# Patient Record
Sex: FEMALE | Race: WHITE | NOT HISPANIC OR LATINO | Employment: FULL TIME | URBAN - METROPOLITAN AREA
[De-identification: names, ages, dates, MRNs, and addresses within clinical notes are randomized per-mention and may not be internally consistent; named-entity substitution may affect disease eponyms.]

---

## 2017-01-11 ENCOUNTER — PATIENT OUTREACH (OUTPATIENT)
Dept: ADMINISTRATIVE | Facility: HOSPITAL | Age: 47
End: 2017-01-11

## 2017-01-26 ENCOUNTER — LAB VISIT (OUTPATIENT)
Dept: LAB | Facility: HOSPITAL | Age: 47
End: 2017-01-26
Attending: INTERNAL MEDICINE
Payer: COMMERCIAL

## 2017-01-26 DIAGNOSIS — E03.9 ACQUIRED HYPOTHYROIDISM: ICD-10-CM

## 2017-01-26 LAB — TSH SERPL DL<=0.005 MIU/L-ACNC: 2.99 UIU/ML

## 2017-01-26 PROCEDURE — 84443 ASSAY THYROID STIM HORMONE: CPT

## 2017-01-26 PROCEDURE — 36415 COLL VENOUS BLD VENIPUNCTURE: CPT | Mod: PO

## 2017-01-27 ENCOUNTER — OFFICE VISIT (OUTPATIENT)
Dept: ENDOCRINOLOGY | Facility: CLINIC | Age: 47
End: 2017-01-27
Payer: COMMERCIAL

## 2017-01-27 VITALS
WEIGHT: 293 LBS | BODY MASS INDEX: 47.09 KG/M2 | SYSTOLIC BLOOD PRESSURE: 108 MMHG | HEIGHT: 66 IN | DIASTOLIC BLOOD PRESSURE: 72 MMHG | HEART RATE: 76 BPM

## 2017-01-27 DIAGNOSIS — E66.01 MORBID OBESITY, UNSPECIFIED OBESITY TYPE: ICD-10-CM

## 2017-01-27 DIAGNOSIS — E03.9 HYPOTHYROIDISM, UNSPECIFIED TYPE: Primary | ICD-10-CM

## 2017-01-27 DIAGNOSIS — E04.2 MULTINODULAR GOITER: ICD-10-CM

## 2017-01-27 PROCEDURE — 99999 PR PBB SHADOW E&M-EST. PATIENT-LVL II: CPT | Mod: PBBFAC,,, | Performed by: INTERNAL MEDICINE

## 2017-01-27 PROCEDURE — 99214 OFFICE O/P EST MOD 30 MIN: CPT | Mod: S$GLB,,, | Performed by: INTERNAL MEDICINE

## 2017-01-27 PROCEDURE — 3078F DIAST BP <80 MM HG: CPT | Mod: S$GLB,,, | Performed by: INTERNAL MEDICINE

## 2017-01-27 PROCEDURE — 1159F MED LIST DOCD IN RCRD: CPT | Mod: S$GLB,,, | Performed by: INTERNAL MEDICINE

## 2017-01-27 PROCEDURE — 3074F SYST BP LT 130 MM HG: CPT | Mod: S$GLB,,, | Performed by: INTERNAL MEDICINE

## 2017-01-27 RX ORDER — LEVOTHYROXINE SODIUM 200 UG/1
200 TABLET ORAL
Qty: 30 TABLET | Refills: 12 | Status: SHIPPED | OUTPATIENT
Start: 2017-01-27 | End: 2018-02-03 | Stop reason: SDUPTHER

## 2017-01-27 NOTE — PROGRESS NOTES
CHIEF COMPLAINT: Hypothyroidism  46 year old being seen as a f/u. She has a history of Graves' disease which has been treated. Subsequently developed hypothyroidism. On synthroid 200 daily. She has some fatigue. Sleeps well at night. She does get some anxiety occasionally. Started wellbutrin. No diarrhea or constipation. Has not been exercising.           PAST MEDICAL HISTORY: Hypothyroidism after treatment for Graves' disease. 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 There is heart disease in the family     MEDICATIONS/ALLERGIES: The patient's MedCard has been updated and reviewed.     ROS:   Constitutional: weight increased  Eyes:  Still occasionally having blurry vision Still seeing optometrist  ENT: No dysphagia  Cardiovascular:No CP   Respiratory: Denies current respiratory difficulty  Gastrointestinal: No nausea or vomiting.   GenitoUrinary - No dysuria  Skin: No new skin rash  Neurologic: No focal neurologic complaints  Remainder ROS negative    PE:   GENERAL: Well developed, well nourished.  PSYCH: appropriate mood and affect, .   EYES: PERRL, EOM intact.  ENT: Nares patent, oropharynx clear, mucosa pink,   NECK: Supple, trachea midline, no thyroid nodules  CHEST: Resp even and unlabored, CTA bilateral.  CARDIAC: RRR, S1, S2 heard, no murmurs, rubs, S3, or S4       Results for MARNIE SINGH (MRN [id number]) as of 2017 13:27   Ref. Range 2017 12:59   TSH Latest Ref Range: 0.400 - 4.000 uIU/mL 2.992         ASSESSMENT/PLAN:  1. Hypothyroidism- TSH WNL. Continue current Tx.     2. Multinodular Goiter- US shows that the nodules are stable. Will start doing US every 2 hours.     3. Morbid Obesity- Discussed diet as well as exercise. Has not been exercising much.     FOLLOWUP  F/U 1 year TSH, Thyroid US

## 2017-05-24 ENCOUNTER — OFFICE VISIT (OUTPATIENT)
Dept: FAMILY MEDICINE | Facility: CLINIC | Age: 47
End: 2017-05-24
Payer: COMMERCIAL

## 2017-05-24 VITALS
WEIGHT: 293 LBS | HEART RATE: 96 BPM | TEMPERATURE: 98 F | SYSTOLIC BLOOD PRESSURE: 136 MMHG | BODY MASS INDEX: 47.09 KG/M2 | RESPIRATION RATE: 16 BRPM | DIASTOLIC BLOOD PRESSURE: 74 MMHG | HEIGHT: 66 IN

## 2017-05-24 DIAGNOSIS — J20.9 BRONCHITIS WITH BRONCHOSPASM: Primary | ICD-10-CM

## 2017-05-24 DIAGNOSIS — R05.9 COUGH: ICD-10-CM

## 2017-05-24 PROCEDURE — 1160F RVW MEDS BY RX/DR IN RCRD: CPT | Mod: S$GLB,,, | Performed by: NURSE PRACTITIONER

## 2017-05-24 PROCEDURE — 99214 OFFICE O/P EST MOD 30 MIN: CPT | Mod: 25,S$GLB,, | Performed by: NURSE PRACTITIONER

## 2017-05-24 PROCEDURE — 3075F SYST BP GE 130 - 139MM HG: CPT | Mod: S$GLB,,, | Performed by: NURSE PRACTITIONER

## 2017-05-24 PROCEDURE — 96372 THER/PROPH/DIAG INJ SC/IM: CPT | Mod: S$GLB,,, | Performed by: NURSE PRACTITIONER

## 2017-05-24 PROCEDURE — 3078F DIAST BP <80 MM HG: CPT | Mod: S$GLB,,, | Performed by: NURSE PRACTITIONER

## 2017-05-24 RX ORDER — PREDNISONE 20 MG/1
TABLET ORAL
Qty: 15 TABLET | Refills: 0 | Status: SHIPPED | OUTPATIENT
Start: 2017-05-24 | End: 2017-12-07

## 2017-05-24 RX ORDER — AZITHROMYCIN 250 MG/1
250 TABLET, FILM COATED ORAL
Refills: 0 | COMMUNITY
Start: 2017-05-10 | End: 2017-05-24 | Stop reason: ALTCHOICE

## 2017-05-24 RX ORDER — DEXAMETHASONE SODIUM PHOSPHATE 4 MG/ML
8 INJECTION, SOLUTION INTRA-ARTICULAR; INTRALESIONAL; INTRAMUSCULAR; INTRAVENOUS; SOFT TISSUE ONCE
Status: COMPLETED | OUTPATIENT
Start: 2017-05-24 | End: 2017-05-24

## 2017-05-24 RX ORDER — BENZONATATE 200 MG/1
200 CAPSULE ORAL 3 TIMES DAILY PRN
Qty: 30 CAPSULE | Refills: 0 | Status: SHIPPED | OUTPATIENT
Start: 2017-05-24 | End: 2017-06-03

## 2017-05-24 RX ADMIN — DEXAMETHASONE SODIUM PHOSPHATE 8 MG: 4 INJECTION, SOLUTION INTRA-ARTICULAR; INTRALESIONAL; INTRAMUSCULAR; INTRAVENOUS; SOFT TISSUE at 04:05

## 2017-05-24 NOTE — PROGRESS NOTES
"Subjective:       Patient ID: Gail Mckinnon is a 47 y.o. female.    Chief Complaint: cough due to Bronchitis x's 2 weeks    HPI onset two weeks of coughing, SOB and wheezing. Went to doctor in [place] and was given Zithromax. Cough productive at times. No fever. Throat is sore. Coughing spasms. Worse at night. Using cough drops. Taking tylenol severe sinus. No N/V/D. See ROS..    The following portion of the patients history was reviewed and updated as appropriate: allergies, current medications, past medical and surgical history. Past social history and problem list reviewed. Family PMH and Past social history reviewed. Tobacco, Illicit drug use reviewed.     Review of Systems   Constitutional: Positive for fatigue. Negative for chills and fever.   HENT: Positive for postnasal drip and rhinorrhea. Negative for sinus pressure, sneezing and sore throat.    Eyes: Negative for visual disturbance.   Respiratory: Positive for cough, chest tightness, shortness of breath and wheezing.    Cardiovascular: Negative for chest pain and palpitations.   Gastrointestinal: Negative for abdominal pain, constipation, diarrhea, nausea and vomiting.   Musculoskeletal: Negative for arthralgias and myalgias.   Neurological: Negative for weakness and headaches.       Objective:     /74   Pulse 96   Temp 98.2 °F (36.8 °C) (Oral)   Resp 16   Ht 5' 6" (1.676 m)   Wt 134 kg (295 lb 6.7 oz)   LMP 05/05/2017   BMI 47.68 kg/m²      Physical Exam     Constitutional: oriented to person, place, and time. well-developed and well-nourished.   HENT: throat clear. Nares patent. Canals and TM normal. No sinus pressure.  Head: Normocephalic.   Eyes: Conjunctivae are normal. Pupils are equal, round, and reactive to light.   Neck: Normal range of motion. Neck supple. No tracheal deviation present. No thyromegaly present. no enlarged or tender anterior cervical lymph nodes.  Cardiovascular: Normal rate, regular rhythm and normal heart " sounds.    Pulmonary/Chest: Effort normal No respiratory distress. Mild end exp wheezing. No rales or rhonchi   Musculoskeletal: Normal range of motion.   Assessment:       1. Bronchitis with bronchospasm    2. Cough        Plan:         Gail Dias was seen today for cough due to bronchitis x's 2 weeks.    Diagnoses and all orders for this visit:    Bronchitis with bronchospasm: due to wheezing and duration of symptoms will cover with steroids. No need for further antibiotics.   -     dexamethasone injection 8 mg; Inject 2 mLs (8 mg total) into the muscle once.    Cough: tessalon perles.     Other orders  -     predniSONE (DELTASONE) 20 MG tablet; Take 3 tablets by mouth once a day x 3 days.  Then take 2 tablets by mouth once a day x 2 days.  Then take 1 tablet by mouth once a day x 2 days.  -     benzonatate (TESSALON) 200 MG capsule; Take 1 capsule (200 mg total) by mouth 3 (three) times daily as needed.    Continue current medication  Take medications only as prescribed  Healthy diet  Adequate rest  Adequate hydration  Avoid allergens  Avoid excessive caffeine

## 2017-12-07 ENCOUNTER — PATIENT MESSAGE (OUTPATIENT)
Dept: FAMILY MEDICINE | Facility: CLINIC | Age: 47
End: 2017-12-07

## 2017-12-07 ENCOUNTER — OFFICE VISIT (OUTPATIENT)
Dept: FAMILY MEDICINE | Facility: CLINIC | Age: 47
End: 2017-12-07
Payer: COMMERCIAL

## 2017-12-07 VITALS
OXYGEN SATURATION: 98 % | SYSTOLIC BLOOD PRESSURE: 118 MMHG | WEIGHT: 293 LBS | HEIGHT: 66 IN | DIASTOLIC BLOOD PRESSURE: 82 MMHG | BODY MASS INDEX: 47.09 KG/M2 | HEART RATE: 95 BPM

## 2017-12-07 DIAGNOSIS — R35.89 POLYURIA: ICD-10-CM

## 2017-12-07 DIAGNOSIS — M54.50 ACUTE MIDLINE LOW BACK PAIN WITHOUT SCIATICA: Primary | ICD-10-CM

## 2017-12-07 DIAGNOSIS — F32.A DEPRESSION, UNSPECIFIED DEPRESSION TYPE: ICD-10-CM

## 2017-12-07 LAB
BACTERIA #/AREA URNS HPF: NORMAL /HPF
BILIRUB SERPL-MCNC: ABNORMAL MG/DL
BILIRUB UR QL STRIP: NEGATIVE
BLOOD, POC UA: 250
CLARITY UR: CLEAR
COLOR UR: YELLOW
GLUCOSE UR QL STRIP: ABNORMAL
GLUCOSE UR QL STRIP: NEGATIVE
HGB UR QL STRIP: ABNORMAL
KETONES UR QL STRIP: ABNORMAL
KETONES UR QL STRIP: NEGATIVE
LEUKOCYTE ESTERASE UR QL STRIP: NEGATIVE
LEUKOCYTE ESTERASE URINE, POC: ABNORMAL
MICROSCOPIC COMMENT: NORMAL
NITRITE UR QL STRIP: NEGATIVE
NITRITE, POC UA: ABNORMAL
PH UR STRIP: 7 [PH] (ref 5–8)
PH, POC UA: ABNORMAL
PROT UR QL STRIP: NEGATIVE
PROTEIN, POC: ABNORMAL
RBC #/AREA URNS HPF: 1 /HPF (ref 0–4)
SP GR UR STRIP: 1.01 (ref 1–1.03)
SPECIFIC GRAVITY, POC UA: ABNORMAL
URN SPEC COLLECT METH UR: ABNORMAL
UROBILINOGEN, POC UA: ABNORMAL
WBC #/AREA URNS HPF: 1 /HPF (ref 0–5)

## 2017-12-07 PROCEDURE — 99999 PR PBB SHADOW E&M-EST. PATIENT-LVL III: CPT | Mod: PBBFAC,,, | Performed by: FAMILY MEDICINE

## 2017-12-07 PROCEDURE — 87086 URINE CULTURE/COLONY COUNT: CPT

## 2017-12-07 PROCEDURE — 99214 OFFICE O/P EST MOD 30 MIN: CPT | Mod: 25,S$GLB,, | Performed by: FAMILY MEDICINE

## 2017-12-07 PROCEDURE — 81000 URINALYSIS NONAUTO W/SCOPE: CPT | Mod: S$GLB,,, | Performed by: FAMILY MEDICINE

## 2017-12-07 PROCEDURE — 81000 URINALYSIS NONAUTO W/SCOPE: CPT | Mod: PO

## 2017-12-07 RX ORDER — SULFAMETHOXAZOLE AND TRIMETHOPRIM 800; 160 MG/1; MG/1
1 TABLET ORAL 2 TIMES DAILY
Qty: 14 TABLET | Refills: 0 | Status: SHIPPED | OUTPATIENT
Start: 2017-12-07 | End: 2018-08-31

## 2017-12-07 RX ORDER — CYCLOBENZAPRINE HCL 10 MG
10 TABLET ORAL NIGHTLY PRN
Qty: 30 TABLET | Refills: 2 | Status: SHIPPED | OUTPATIENT
Start: 2017-12-07 | End: 2019-03-28 | Stop reason: SDUPTHER

## 2017-12-07 RX ORDER — PREDNISONE 10 MG/1
TABLET ORAL
Qty: 20 TABLET | Refills: 0 | Status: SHIPPED | OUTPATIENT
Start: 2017-12-07 | End: 2018-08-31

## 2017-12-07 NOTE — PROGRESS NOTES
Subjective:       Patient ID: Gail Mckinnon is a 47 y.o. female.    Chief Complaint: Low-back Pain    HPI     Reports low back pain x 5 days. Triggered by bending over to  something from floor.  Reports achy central lower back pain with spasms. Ps: 6-9/10.  No sciatic pain.  Mod relief from taking otc advil 800 or aleve.      C/o fever x 1 day.  C/o polyuria x 2 days.      Depression stable.       Review of Systems   Constitutional: Positive for fever.   Cardiovascular: Negative for chest pain.   Gastrointestinal: Negative for abdominal pain.   Genitourinary: Positive for pelvic pain. Negative for dysuria and hematuria.   Musculoskeletal: Positive for back pain.   Neurological: Positive for weakness. Negative for numbness and headaches.       Objective:      Physical Exam   Constitutional: She appears well-developed.   HENT:   Head: Normocephalic and atraumatic.   Eyes: Conjunctivae are normal. Pupils are equal, round, and reactive to light.   Neck: Normal range of motion.   Cardiovascular: Normal rate and regular rhythm.    No murmur heard.  Pulmonary/Chest: Effort normal and breath sounds normal. She has no wheezes.   Abdominal: Soft. Bowel sounds are normal. There is no tenderness.   Musculoskeletal:   lumbar spine: neg tenderness of bilat paravert area.  Right slr to 90 degrees reproduces no pain.  Left slr to 70 degrees reproduces left and central lower pain.      Lymphadenopathy:     She has no cervical adenopathy.       Assessment:       1. Acute midline low back pain without sciatica    2. Polyuria    3. Depression, unspecified depression type        Plan:       Acute midline low back pain without sciatica  -     Ambulatory Referral to Physical/Occupational Therapy    Polyuria  -     Urinalysis; Future; Expected date: 12/07/2017  -     Urine culture; Future; Expected date: 12/07/2017  -     POCT URINALYSIS    Depression, unspecified depression type    Other orders  -      sulfamethoxazole-trimethoprim 800-160mg (BACTRIM DS) 800-160 mg Tab; Take 1 tablet by mouth 2 (two) times daily.  Dispense: 14 tablet; Refill: 0  -     cyclobenzaprine (FLEXERIL) 10 MG tablet; Take 1 tablet (10 mg total) by mouth nightly as needed for Muscle spasms.  Dispense: 30 tablet; Refill: 2          Plan:  Refer to physio. Start flexeril. Cont with motrin or aleve.  Start bactrim ds for uti  Depression stable.

## 2017-12-08 LAB — BACTERIA UR CULT: NO GROWTH

## 2017-12-11 RX ORDER — NAPROXEN 500 MG/1
500 TABLET ORAL 2 TIMES DAILY PRN
Qty: 60 TABLET | Refills: 5 | Status: SHIPPED | OUTPATIENT
Start: 2017-12-11 | End: 2018-11-19 | Stop reason: SDUPTHER

## 2018-02-05 RX ORDER — LEVOTHYROXINE SODIUM 200 UG/1
TABLET ORAL
Qty: 30 TABLET | Refills: 12 | Status: SHIPPED | OUTPATIENT
Start: 2018-02-05 | End: 2018-02-05 | Stop reason: SDUPTHER

## 2018-02-05 RX ORDER — LEVOTHYROXINE SODIUM 200 UG/1
200 TABLET ORAL
Qty: 30 TABLET | Refills: 2 | Status: SHIPPED | OUTPATIENT
Start: 2018-02-05 | End: 2018-08-31 | Stop reason: SDUPTHER

## 2018-02-06 RX ORDER — BUSPIRONE HYDROCHLORIDE 10 MG/1
10 TABLET ORAL 2 TIMES DAILY
Qty: 60 TABLET | Refills: 3 | OUTPATIENT
Start: 2018-02-06 | End: 2018-05-14 | Stop reason: SDUPTHER

## 2018-04-13 DIAGNOSIS — Z12.11 COLON CANCER SCREENING: ICD-10-CM

## 2018-04-16 ENCOUNTER — PATIENT OUTREACH (OUTPATIENT)
Dept: ADMINISTRATIVE | Facility: HOSPITAL | Age: 48
End: 2018-04-16

## 2018-04-16 RX ORDER — BUPROPION HYDROCHLORIDE 150 MG/1
150 TABLET, EXTENDED RELEASE ORAL 2 TIMES DAILY
Qty: 60 TABLET | Refills: 11 | Status: SHIPPED | OUTPATIENT
Start: 2018-04-16 | End: 2019-08-29 | Stop reason: SDUPTHER

## 2018-05-06 ENCOUNTER — PATIENT MESSAGE (OUTPATIENT)
Dept: ENDOCRINOLOGY | Facility: CLINIC | Age: 48
End: 2018-05-06

## 2018-05-07 ENCOUNTER — PATIENT MESSAGE (OUTPATIENT)
Dept: ENDOCRINOLOGY | Facility: CLINIC | Age: 48
End: 2018-05-07

## 2018-05-08 DIAGNOSIS — E04.2 MULTINODULAR GOITER: ICD-10-CM

## 2018-05-08 DIAGNOSIS — E03.9 HYPOTHYROIDISM, UNSPECIFIED TYPE: Primary | ICD-10-CM

## 2018-05-10 ENCOUNTER — PATIENT MESSAGE (OUTPATIENT)
Dept: ENDOCRINOLOGY | Facility: CLINIC | Age: 48
End: 2018-05-10

## 2018-05-14 RX ORDER — BUSPIRONE HYDROCHLORIDE 10 MG/1
10 TABLET ORAL 2 TIMES DAILY
Qty: 60 TABLET | Refills: 5 | Status: SHIPPED | OUTPATIENT
Start: 2018-05-14 | End: 2019-11-14 | Stop reason: SDUPTHER

## 2018-05-14 RX ORDER — BUSPIRONE HYDROCHLORIDE 10 MG/1
10 TABLET ORAL 2 TIMES DAILY
Qty: 60 TABLET | Refills: 3 | Status: CANCELLED | OUTPATIENT
Start: 2018-05-14

## 2018-08-27 ENCOUNTER — PATIENT MESSAGE (OUTPATIENT)
Dept: ENDOCRINOLOGY | Facility: CLINIC | Age: 48
End: 2018-08-27

## 2018-08-27 DIAGNOSIS — E03.9 HYPOTHYROIDISM, UNSPECIFIED TYPE: Primary | ICD-10-CM

## 2018-08-28 ENCOUNTER — HOSPITAL ENCOUNTER (OUTPATIENT)
Dept: RADIOLOGY | Facility: HOSPITAL | Age: 48
Discharge: HOME | End: 2018-08-28
Attending: INTERNAL MEDICINE
Payer: COMMERCIAL

## 2018-08-28 DIAGNOSIS — E04.2 MULTINODULAR GOITER: ICD-10-CM

## 2018-08-28 PROCEDURE — 76536 US EXAM OF HEAD AND NECK: CPT | Mod: TC,PO

## 2018-08-28 PROCEDURE — 76536 US EXAM OF HEAD AND NECK: CPT | Mod: 26,,, | Performed by: RADIOLOGY

## 2018-08-31 ENCOUNTER — OFFICE VISIT (OUTPATIENT)
Dept: ENDOCRINOLOGY | Facility: CLINIC | Age: 48
End: 2018-08-31
Payer: COMMERCIAL

## 2018-08-31 VITALS
DIASTOLIC BLOOD PRESSURE: 64 MMHG | BODY MASS INDEX: 45.69 KG/M2 | HEIGHT: 66 IN | HEART RATE: 96 BPM | WEIGHT: 284.31 LBS | SYSTOLIC BLOOD PRESSURE: 120 MMHG

## 2018-08-31 DIAGNOSIS — R53.83 FATIGUE, UNSPECIFIED TYPE: ICD-10-CM

## 2018-08-31 DIAGNOSIS — E03.9 HYPOTHYROIDISM, UNSPECIFIED TYPE: Primary | ICD-10-CM

## 2018-08-31 DIAGNOSIS — E66.01 MORBID OBESITY: ICD-10-CM

## 2018-08-31 DIAGNOSIS — E04.2 MULTINODULAR GOITER: ICD-10-CM

## 2018-08-31 PROCEDURE — 99214 OFFICE O/P EST MOD 30 MIN: CPT | Mod: S$GLB,,, | Performed by: INTERNAL MEDICINE

## 2018-08-31 PROCEDURE — 99999 PR PBB SHADOW E&M-EST. PATIENT-LVL III: CPT | Mod: PBBFAC,,, | Performed by: INTERNAL MEDICINE

## 2018-08-31 PROCEDURE — 3008F BODY MASS INDEX DOCD: CPT | Mod: CPTII,S$GLB,, | Performed by: INTERNAL MEDICINE

## 2018-08-31 RX ORDER — LEVOTHYROXINE SODIUM 200 UG/1
200 TABLET ORAL
Qty: 90 TABLET | Refills: 3 | Status: SHIPPED | OUTPATIENT
Start: 2018-08-31 | End: 2019-09-11 | Stop reason: SDUPTHER

## 2018-08-31 NOTE — PROGRESS NOTES
CHIEF COMPLAINT: Hypothyroidism  48 year old being seen as a f/u. She has a history of Graves' disease which has been treated. Subsequently developed hypothyroidism. On synthroid 200 daily. She does have fatigue. Gets up once a night. Gets 6 hours a night on avg. She does occasionally snore. No tremors. Has had some palpitations with anxiety. No exercise.               PAST MEDICAL HISTORY: Hypothyroidism after treatment for Graves' disease. 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 There is heart disease in the family     MEDICATIONS/ALLERGIES: The patient's MedCard has been updated and reviewed.     ROS:   Constitutional: weight decreased  Eyes:  Vision stable. ENT: No dysphagia  Cardiovascular:No CP   Respiratory: Denies current respiratory difficulty  Gastrointestinal: No nausea or vomiting.   GenitoUrinary - No dysuria  Skin: No new skin rash  Neurologic: No focal neurologic complaints  Remainder ROS negative    PE:   GENERAL: Well developed, well nourished.  PSYCH: appropriate mood and affect, .   EYES: PERRL, EOM intact.  ENT: Nares patent, oropharynx clear, mucosa pink,   NECK: Supple, trachea midline, no thyroid nodules  CHEST: Resp even and unlabored, CTA bilateral.  CARDIAC: RRR, S1, S2 heard, no murmurs, rubs, S3, or S4       Results for MARNIE SINGH (MRN [id number]) as of 2018 13:29   Ref. Range 2018 11:20   TSH Latest Ref Range: 0.400 - 4.000 uIU/mL 1.036   T3, Total Latest Ref Range: 60 - 180 ng/dL 84       US SOFT TISSUE HEAD NECK THYROID    CLINICAL HISTORY:  Nontoxic multinodular goiter    TECHNIQUE:  Ultrasound of the thyroid and cervical lymph nodes was performed.    COMPARISON:  2015    FINDINGS:  The right lobe of thyroid gland measures 4.2 x 1.5 x 1.0 cm in size.  The left lobe the thyroid gland measures 3.4 x 1.5 x 0.9cm in size.  This gives an approximate volume  of on the right of 3.3cc and an approximate volume on the left of 2.4 cc for a total approximate volume of 5.7 cc.    A calcified hyperechoic nodule is noted within the right lobe of the thyroid gland at the mid gland without worrisome change since 11/05/2015 of 7 mm    Within the mid right lobe of the thyroid gland a hypoechoic nodule is noted of 1.1 x 1.0 x 1.0 cm without convincing detrimental change since 11/09/2015 favoring a benign etiology.      Impression       1. No worrisome detrimental changes are noted since 11/09/2015.  No nodules are noted meeting criteria for fine-needle aspiration or biopsy.  2. The thyroid gland appears small and atrophied which could relate to a history of thyroiditis or prior injury.           ASSESSMENT/PLAN:  1. Hypothyroidism- TFT WNL. Continue current Tx. Discussed unsure if fatigue is related as her TFT are normal    2. Multinodular Goiter- US shows that the nodules are stable. Will start doing US every 2 hours.     3. Morbid Obesity- Discussed diet as well as exercise. Has not been exercising much.     4. Fatigue- She does snore. Discussed seeing sleep medicine    FOLLOWUP  F/U 1 year TSH

## 2018-10-29 DIAGNOSIS — K29.70 GASTRITIS, PRESENCE OF BLEEDING UNSPECIFIED, UNSPECIFIED CHRONICITY, UNSPECIFIED GASTRITIS TYPE: ICD-10-CM

## 2018-10-29 DIAGNOSIS — I10 HYPERTENSION, UNSPECIFIED TYPE: Primary | ICD-10-CM

## 2018-10-29 RX ORDER — OMEPRAZOLE 40 MG/1
40 CAPSULE, DELAYED RELEASE ORAL DAILY
Qty: 30 CAPSULE | Refills: 1 | Status: CANCELLED | OUTPATIENT
Start: 2018-10-29

## 2018-10-29 NOTE — PROGRESS NOTES
Refill Authorization Note     is requesting a refill authorization.    Brief assessment and rationale for refill: DEFER; 30 day protocol/ Needs Appt/ Needs Labs   Amount/Quantity of medication ordered: 90d  Date of last appointment: 12/7/2017     Refills Authorized: Yes  If authorized number of refills: 0        Medication-related problems identified:   Requires labs  Requires appointment  Non-adherence - intentional  Medication Therapy Plan: Per MD nurse: Advised to make appt to see PCP & CMP/ lipid per [facility].  30 tabs and 0 refill pended;   Name and strength of medication: omeprazole (PRILOSEC) 40 MG capsule  How patient will take medication: t1c po qd  Medication reconciliation completed: No        Comments:     Lab Results   Component Value Date    WBC 7.06 03/22/2016    HGB 12.8 03/22/2016    RBC 4.95 03/22/2016    HCT 41.9 03/22/2016    MCV 85 03/22/2016     (H) 03/22/2016     No results found for: MG  Lab Results   Component Value Date    [id number] 524 12/04/2008

## 2018-10-30 NOTE — PROGRESS NOTES
Refill Authorization Note     is requesting a refill authorization.    Brief assessment and rationale for refill: DEFER; Needs Appt/ Needs Labs   Amount/Quantity of medication ordered: 30d  Date of last appointment: 12/7/2017     Refills Authorized: Yes  If authorized number of refills: 0        Medication-related problems identified:   Requires labs  Requires appointment  Non-adherence - intentional  Medication Therapy Plan: Per MD nurse: Advised to make appt to see PCP & CMP/ lipid per [facility]. Non-adherence; Defer to you  Name and strength of medication: omeprazole (PRILOSEC) 40 MG capsule  How patient will take medication: t1c po qd  Medication reconciliation completed: No

## 2018-11-01 RX ORDER — OMEPRAZOLE 40 MG/1
40 CAPSULE, DELAYED RELEASE ORAL DAILY
Qty: 30 CAPSULE | Refills: 5 | Status: SHIPPED | OUTPATIENT
Start: 2018-11-01 | End: 2020-01-14

## 2018-11-02 DIAGNOSIS — Z12.39 BREAST CANCER SCREENING: ICD-10-CM

## 2018-11-13 ENCOUNTER — PATIENT OUTREACH (OUTPATIENT)
Dept: ADMINISTRATIVE | Facility: HOSPITAL | Age: 48
End: 2018-11-13

## 2018-11-13 NOTE — PROGRESS NOTES
Health Maintenance Due   Topic Date Due    Colonoscopy  04/07/2018    Mammogram  10/21/2018

## 2018-11-19 ENCOUNTER — OFFICE VISIT (OUTPATIENT)
Dept: PRIMARY CARE CLINIC | Facility: CLINIC | Age: 48
End: 2018-11-19
Payer: COMMERCIAL

## 2018-11-19 VITALS
HEART RATE: 64 BPM | WEIGHT: 277.56 LBS | TEMPERATURE: 98 F | DIASTOLIC BLOOD PRESSURE: 72 MMHG | BODY MASS INDEX: 44.61 KG/M2 | SYSTOLIC BLOOD PRESSURE: 114 MMHG | HEIGHT: 66 IN | OXYGEN SATURATION: 98 %

## 2018-11-19 DIAGNOSIS — J02.9 SORE THROAT: ICD-10-CM

## 2018-11-19 DIAGNOSIS — J02.9 VIRAL PHARYNGITIS: Primary | ICD-10-CM

## 2018-11-19 LAB
CTP QC/QA: YES
S PYO RRNA THROAT QL PROBE: NEGATIVE

## 2018-11-19 PROCEDURE — 87880 STREP A ASSAY W/OPTIC: CPT | Mod: QW,S$GLB,, | Performed by: NURSE PRACTITIONER

## 2018-11-19 PROCEDURE — 99214 OFFICE O/P EST MOD 30 MIN: CPT | Mod: 25,S$GLB,, | Performed by: NURSE PRACTITIONER

## 2018-11-19 PROCEDURE — 3008F BODY MASS INDEX DOCD: CPT | Mod: CPTII,S$GLB,, | Performed by: NURSE PRACTITIONER

## 2018-11-19 PROCEDURE — 99999 PR PBB SHADOW E&M-EST. PATIENT-LVL IV: CPT | Mod: PBBFAC,,, | Performed by: NURSE PRACTITIONER

## 2018-11-19 RX ORDER — NAPROXEN 500 MG/1
500 TABLET ORAL 2 TIMES DAILY PRN
Qty: 60 TABLET | Refills: 5 | Status: SHIPPED | OUTPATIENT
Start: 2018-11-19 | End: 2019-06-23

## 2018-11-19 NOTE — Clinical Note
Tima Mederos MD,  I saw your patient today in the [facility].  If you have any questions, please do not hesitate to contact me.  Thank you!  MIN Hanks

## 2018-11-19 NOTE — PATIENT INSTRUCTIONS
THROAT INFECTION OR TONSIL INFECTION    The four symptoms highly associated with Strep throat are:  Fever or h/o fever,  Swollen glands in the neck,  Exudate on the tonsils AND  Absence of cough.    You have 3 of these 4.  Your rapid strep test was negative  A strep culture is pending.  We'll call in 2-3 days with the results.    If you have a runny nose, cough or watery eyes, your sore throat is more likely caused by a virus.    Throat or tonsil infections that are caused by Strep get better with antibiotics.  Non-strep sore throats are caused by viruses, which do not get better with antibiotics.    Today you have been found to:    Have sore throat likely due to a virus.  No matter what the cause, the following will help you get well and feel better:    Drinking plenty of fluids, unless your fluid intake is restricted.  Using throat spray or lozenges with benzocaine or gargling with warm salt water gargle to decrease sore throat pain.  Taking Tylenol (acetaminophen) or Ibuprofen (Motrin, Advil) for fever or pain.      See your regular doctor for:  --Symptoms not improved in 3-4 days  --Worsening fever, throat pain  --Difficulty swallowing  --New, unexplained symptoms develop.    Go to the emergency room if:  ---you are unable to swallow anything.  ---your uvula (that hangs down in the middle of the back of your mouth) is not in the middle or begins to point to one side or the other.    If you have any questions, please call.  You can reach us at [phone] Monday through Thursday (except holidays) 10 a.m. To 6 p.m.    Thank you for using the [facility]!  Debo Castillo, APRN, CNP, FNP-BC  [facility]

## 2018-11-19 NOTE — PROGRESS NOTES
"Gail Mckinnon is a 48 y.o. female patient of Tima Mederos MD who presents to the clinic today for   Chief Complaint   Patient presents with    Sore Throat    Chills    Generalized Body Aches    Otalgia   .    HPI    Pt, who is not known to me, reports a new problem to me: mild ST sxs and not feeling welll for about a week.  Yesterday the ST got a lot worse--hurt to swallow, now achey, headache, body aches, nausea, throat feels swollen.  This morning a little sinus drainage.  Has felt hot but didn't check for fever.  Did have fever last week.  No known sick contacts.    These symptoms began 1 week  ago and status is worsening.     Symptoms are + acute.    Pt denies the following symptoms:  Fever presently, cough    Aggravating factors include nothing .    Relieving factors include nothing .    OTC Medications tried are Ibuprofen/Tylenol.    Prescription medications taken for symptoms are none.    Pertinent medical history:  Gets congestion in the nose with change of seasons but no dx of allergies, no h/o asthma.    Smoking status:  nonsmoker    Works for home health.    ROS    Constitutional:   Last week had fever, + fatigue, decrease in appetite.    Head:   + headache  Ears:   + R>L mild pain.  Eyes:  "Yellow looking at the bottom"--burning  Nose:   No sinus pain, no congestion.  Throat:  + ST pain.    Heart:  "I always have heart palpitations"--states she intermittently  Takes her buspar, which was recommended for this, seems to work better for her, she statets; no chest pain.    Lungs:  No difficulty breathing (does get some r/t panic attacks), no coughing.    GI/:  Some nausea    MS:  No new bone, joint or muscle problems.    Skin:  No rashes, itching.      PAST MEDICAL HISTORY:  Past Medical History:   Diagnosis Date    Anxiety     Depression     Hypothyroid     Menorrhagia     Multinodular goiter 8/24/2012    Palpitation     Venous insufficiency        PAST SURGICAL HISTORY:  Past " Surgical History:   Procedure Laterality Date     SECTION, CLASSIC      tonsillectomy         SOCIAL HISTORY:  Social History     Socioeconomic History    Marital status:      Spouse name: Not on file    Number of children: Not on file    Years of education: Not on file    Highest education level: Not on file   Social Needs    Financial resource strain: Not on file    Food insecurity - worry: Not on file    Food insecurity - inability: Not on file    Transportation needs - medical: Not on file    Transportation needs - non-medical: Not on file   Occupational History    Not on file   Tobacco Use    Smoking status: Never Smoker   Substance and Sexual Activity    Alcohol use: No    Drug use: Not on file    Sexual activity: Not on file   Other Topics Concern    Not on file   Social History Narrative    Not on file       FAMILY HISTORY:  Family History   Problem Relation Age of Onset    Diabetes Mother 65    Cancer Maternal Aunt         lung cancer    Cancer Maternal Grandmother         stomach cancer       ALLERGIES AND MEDICATIONS: updated and reviewed.  Review of patient's allergies indicates:   Allergen Reactions    Penicillin g      unknown    Penicillins      Other reaction(s): Unknown     Current Outpatient Medications   Medication Sig Dispense Refill    buPROPion (WELLBUTRIN SR) 150 MG TBSR 12 hr tablet Take 1 tablet (150 mg total) by mouth 2 (two) times daily. 60 tablet 11    busPIRone (BUSPAR) 10 MG tablet Take 1 tablet (10 mg total) by mouth 2 (two) times daily. 60 tablet 5    levothyroxine (SYNTHROID) 200 MCG tablet Take 1 tablet (200 mcg total) by mouth before breakfast. 90 tablet 3    naproxen (NAPROSYN) 500 MG tablet Take 1 tablet (500 mg total) by mouth 2 (two) times daily as needed. 60 tablet 5    omeprazole (PRILOSEC) 40 MG capsule Take 1 capsule (40 mg total) by mouth once daily. Please call clinic to schedule appointment and labs;Last fill. 30 capsule 5      No current facility-administered medications for this visit.              PHYSICAL EXAM    Alert, coop 48 y.o. female patient in no acute distress.    Vitals:    11/19/18 1527   BP: 114/72   Pulse: 64   Temp: 98.2 °F (36.8 °C)     VS reviewed.  VS stable.  CC, nursing note, medications & PMH all reviewed today.    Head:  Normocephalic, atraumatic.    EENT:  EACs patent.  TMs no erythema, bilat dull LR, bilat small effusions (nonsupurative), no TM perforation.                              Eye lids normal, no discharge present.       Sinus tenderness to palp--none.               Nares--+ edema, no d/c present.    Pharynx + injected.                Tonsils not erythematous , not enlarged, no exudate present.    Bilat anterior, no posterior cervical lymph nodes palpable.    Bilat submandibular but no submental or supraclavicular lymph nodes palp.             Resp:  Respirations even, unlabored   Lungs CTA bilat.  No wheezing.  No crackles.  Moves air to bases bilat.    Heart:  RRR    MS:  Ambulates normally.             NEURO:  Alert and oriented x 4.  Responds appropriately during interaction.    Skin:  Warm, dry, color good.    Viral pharyngitis  -     naproxen (NAPROSYN) 500 MG tablet; Take 1 tablet (500 mg total) by mouth 2 (two) times daily as needed.  Dispense: 60 tablet; Refill: 5    Sore throat  -     POCT rapid strep A      Pt today presents with report of feeling under the weather for about a week.  Then over the past 2-3 days she began to feel a lot worse with ear pain, swollen glands and sore throat, feeling achey.  On exam she has swollen glands in the neck, mildly erythematous pharynx, lymphadenopathy, nasal congestion.  Lungs CTA.    This is a new problem to me and the following work up is planned.    Lab & Radiological Tests Ordered: POCT strep.  The results are neg.  Throat culture ordered, result spending.    Pt advised to perform comfort measures recommended on patient instruction sheet .    If  not better in 3-4 days, the patient is advised to call us.  If worse or concerns, the patient is advised to call us.  Explained exam findings, diagnosis and treatment plan to patient.  Questions answered and patient states understanding.

## 2018-11-23 ENCOUNTER — PATIENT MESSAGE (OUTPATIENT)
Dept: PRIMARY CARE CLINIC | Facility: CLINIC | Age: 48
End: 2018-11-23

## 2018-11-23 NOTE — TELEPHONE ENCOUNTER
Spoke with pt.  Recommend otc medications for pnd and warm salt water gargles for sore throat.  Will follow up with Debo Medina on strep cx

## 2018-11-24 ENCOUNTER — LAB VISIT (OUTPATIENT)
Dept: LAB | Facility: HOSPITAL | Age: 48
End: 2018-11-24
Attending: FAMILY MEDICINE
Payer: COMMERCIAL

## 2018-11-24 DIAGNOSIS — I10 HYPERTENSION, UNSPECIFIED TYPE: ICD-10-CM

## 2018-11-24 LAB
ALBUMIN SERPL BCP-MCNC: 3.5 G/DL
ALP SERPL-CCNC: 90 U/L
ALT SERPL W/O P-5'-P-CCNC: 27 U/L
ANION GAP SERPL CALC-SCNC: 8 MMOL/L
AST SERPL-CCNC: 19 U/L
BILIRUB SERPL-MCNC: 0.3 MG/DL
BUN SERPL-MCNC: 11 MG/DL
CALCIUM SERPL-MCNC: 10.1 MG/DL
CHLORIDE SERPL-SCNC: 108 MMOL/L
CHOLEST SERPL-MCNC: 171 MG/DL
CHOLEST/HDLC SERPL: 4.8 {RATIO}
CO2 SERPL-SCNC: 24 MMOL/L
CREAT SERPL-MCNC: 0.7 MG/DL
EST. GFR  (AFRICAN AMERICAN): >60 ML/MIN/1.73 M^2
EST. GFR  (NON AFRICAN AMERICAN): >60 ML/MIN/1.73 M^2
GLUCOSE SERPL-MCNC: 84 MG/DL
HDLC SERPL-MCNC: 36 MG/DL
HDLC SERPL: 21.1 %
LDLC SERPL CALC-MCNC: 105 MG/DL
NONHDLC SERPL-MCNC: 135 MG/DL
POTASSIUM SERPL-SCNC: 4.1 MMOL/L
PROT SERPL-MCNC: 6.6 G/DL
SODIUM SERPL-SCNC: 140 MMOL/L
TRIGL SERPL-MCNC: 150 MG/DL

## 2018-11-24 PROCEDURE — 80053 COMPREHEN METABOLIC PANEL: CPT

## 2018-11-24 PROCEDURE — 36415 COLL VENOUS BLD VENIPUNCTURE: CPT | Mod: PO

## 2018-11-24 PROCEDURE — 80061 LIPID PANEL: CPT

## 2018-11-27 ENCOUNTER — OFFICE VISIT (OUTPATIENT)
Dept: FAMILY MEDICINE | Facility: CLINIC | Age: 48
End: 2018-11-27
Payer: COMMERCIAL

## 2018-11-27 VITALS
BODY MASS INDEX: 45.46 KG/M2 | TEMPERATURE: 98 F | HEIGHT: 66 IN | OXYGEN SATURATION: 95 % | HEART RATE: 92 BPM | WEIGHT: 282.88 LBS | DIASTOLIC BLOOD PRESSURE: 70 MMHG | SYSTOLIC BLOOD PRESSURE: 124 MMHG

## 2018-11-27 DIAGNOSIS — R53.83 FATIGUE, UNSPECIFIED TYPE: ICD-10-CM

## 2018-11-27 DIAGNOSIS — J32.9 SINUSITIS, UNSPECIFIED CHRONICITY, UNSPECIFIED LOCATION: ICD-10-CM

## 2018-11-27 DIAGNOSIS — F41.9 ANXIETY: ICD-10-CM

## 2018-11-27 DIAGNOSIS — F32.A DEPRESSION, UNSPECIFIED DEPRESSION TYPE: ICD-10-CM

## 2018-11-27 DIAGNOSIS — E61.1 IRON DEFICIENCY: ICD-10-CM

## 2018-11-27 DIAGNOSIS — Z00.00 ROUTINE MEDICAL EXAM: Primary | ICD-10-CM

## 2018-11-27 DIAGNOSIS — E03.9 HYPOTHYROIDISM, UNSPECIFIED TYPE: ICD-10-CM

## 2018-11-27 PROCEDURE — 99396 PREV VISIT EST AGE 40-64: CPT | Mod: S$GLB,,, | Performed by: FAMILY MEDICINE

## 2018-11-27 PROCEDURE — 99999 PR PBB SHADOW E&M-EST. PATIENT-LVL III: CPT | Mod: PBBFAC,,, | Performed by: FAMILY MEDICINE

## 2018-11-27 RX ORDER — PREDNISONE 10 MG/1
TABLET ORAL
Qty: 20 TABLET | Refills: 0 | Status: SHIPPED | OUTPATIENT
Start: 2018-11-27 | End: 2020-01-14 | Stop reason: ALTCHOICE

## 2018-11-27 RX ORDER — AZITHROMYCIN 250 MG/1
TABLET, FILM COATED ORAL
Qty: 6 TABLET | Refills: 0 | Status: SHIPPED | OUTPATIENT
Start: 2018-11-27 | End: 2020-01-14 | Stop reason: ALTCHOICE

## 2018-11-27 NOTE — PROGRESS NOTES
Subjective:       Patient ID: Gail Mckinnon is a 48 y.o. female.    Chief Complaint: Follow-up (right ear pain, sore throat, culture done last week)    HPI     Here for a check up.     Reviewed recent labs. All wnl.     C/o dry cough, sore throat, postnasal drip and nasal congestion that started 2 weeks ago.  Now, c/o frontal sinus pressure and right ear pain x 1 week.  No fever.     Hx of iron deficiency in past. Taking iron tabs every other day up until 1 month ago, in which taking iron tab once a week.     Depression/anxiety stable.     Hypothyroidism stable.     Review of Systems      Review of Systems   Constitutional: Negative for fever and chills.   HENT: Negative for hearing loss and neck stiffness.    Eyes: Negative for redness and itching.   Respiratory: Negative for choking.    Cardiovascular: Negative for chest pain and leg swelling.  Abdomen: Negative for abdominal pain and blood in stool.   Genitourinary: Negative for dysuria and flank pain.   Musculoskeletal: Negative for back pain and gait problem.   Neurological: Negative for light-headedness and headaches.   Hematological: Negative for adenopathy.   Psychiatric/Behavioral: Negative for behavioral problems.     Objective:      Physical Exam   Constitutional: She is oriented to person, place, and time. She appears well-developed. No distress.   HENT:   Head: Normocephalic and atraumatic.   Mouth/Throat: Oropharynx is clear and moist.   Sinus: bilateral frontal sinus tenderness    Right tm: mild effusion noted without injection  Left tm: normal    Pharynx: elongated soft palate   Eyes: Conjunctivae are normal. Pupils are equal, round, and reactive to light.   Neck: Normal range of motion. Neck supple. No thyromegaly present.   Cardiovascular: Normal rate, regular rhythm and normal heart sounds. Exam reveals no friction rub.   No murmur heard.  Pulmonary/Chest: Effort normal and breath sounds normal. She has no wheezes. She has no rales.    Abdominal: Soft. Bowel sounds are normal. She exhibits no distension and no mass. There is no tenderness.   Musculoskeletal: Normal range of motion. She exhibits no edema or tenderness.   Lymphadenopathy:     She has no cervical adenopathy.   Neurological: She is alert and oriented to person, place, and time. She has normal reflexes. No cranial nerve deficit.   Skin: Skin is warm. No rash noted. No erythema.   Psychiatric: She has a normal mood and affect. Thought content normal.       Assessment:       1. Routine medical exam    2. Fatigue, unspecified type    3. Iron deficiency    4. Hypothyroidism, unspecified type    5. Sinusitis, unspecified chronicity, unspecified location    6. Depression, unspecified depression type    7. Anxiety        Plan:       Routine medical exam    Fatigue, unspecified type  -     Vitamin D; Future; Expected date: 11/27/2018  -     Vitamin B12; Future; Expected date: 11/27/2018    Iron deficiency  -     CBC auto differential; Future; Expected date: 11/27/2018  -     Ferritin; Future; Expected date: 11/27/2018  -     Iron and TIBC; Future; Expected date: 11/27/2018  -     Transferrin; Future; Expected date: 11/27/2018  -     Soluble Transferrin Receptor; Future; Expected date: 11/27/2018    Hypothyroidism, unspecified type    Sinusitis, unspecified chronicity, unspecified location    Depression, unspecified depression type    Anxiety    Other orders  -     azithromycin (ZITHROMAX Z-ELISE) 250 MG tablet; Take 2 tabs daily for first day, then 1 tab daily for next 4 days.  Dispense: 6 tablet; Refill: 0  -     predniSONE (DELTASONE) 10 MG tablet; Take 4 tabs daily for 2 days then Take 3 tabs daily for 2 days thenTake 2 tabs daily for 2 days then Take 1 tab daily for 2 days then stop  Dispense: 20 tablet; Refill: 0          Plan:  See orders  Start zpak and prednisone for sinus infection  Cont all other meds    Pt will call her insurance re: sleep study.  Has sxs of daytime somnolence and  snoring.        Medication List           Accurate as of 11/27/18  4:18 PM. If you have any questions, ask your nurse or doctor.               START taking these medications    azithromycin 250 MG tablet  Commonly known as:  ZITHROMAX Z-ELISE  Take 2 tabs daily for first day, then 1 tab daily for next 4 days.  Started by:  iTma Mederos MD     predniSONE 10 MG tablet  Commonly known as:  DELTASONE  Take 4 tabs daily for 2 days then Take 3 tabs daily for 2 days thenTake 2 tabs daily for 2 days then Take 1 tab daily for 2 days then stop  Started by:  Tima Mederos MD        CONTINUE taking these medications    buPROPion 150 MG TBSR 12 hr tablet  Commonly known as:  WELLBUTRIN SR  Take 1 tablet (150 mg total) by mouth 2 (two) times daily.     busPIRone 10 MG tablet  Commonly known as:  BUSPAR  Take 1 tablet (10 mg total) by mouth 2 (two) times daily.     levothyroxine 200 MCG tablet  Commonly known as:  SYNTHROID  Take 1 tablet (200 mcg total) by mouth before breakfast.     naproxen 500 MG tablet  Commonly known as:  NAPROSYN  Take 1 tablet (500 mg total) by mouth 2 (two) times daily as needed.     omeprazole 40 MG capsule  Commonly known as:  PRILOSEC  Take 1 capsule (40 mg total) by mouth once daily. Please call clinic to schedule appointment and labs;Last fill.           Where to Get Your Medications      These medications were sent to [address] - [address] [facility] [address]  [address] [facility] [address], [facility] [address]    Phone:  [phone]   · azithromycin 250 MG tablet  · predniSONE 10 MG tablet

## 2019-03-28 RX ORDER — CYCLOBENZAPRINE HCL 10 MG
TABLET ORAL
Qty: 30 TABLET | Refills: 0 | Status: SHIPPED | OUTPATIENT
Start: 2019-03-28 | End: 2019-12-31 | Stop reason: SDUPTHER

## 2019-03-28 NOTE — PROGRESS NOTES
Refill Authorization Note     is requesting a refill authorization.    Brief assessment and rationale for refill: ROUTE: op/ not recently prescribed/nco  Name and strength of medication: cyclobenzaprine (FLEXERIL) 10 MG tablet            Medication reconciliation completed: No                         Comments:   APPOINTMENTS (past 12 m or future 3m authorizing provider)  LAST VISIT DATE  Tima Mederos MD 11/27/2018         NEXT VISIT DATE  Tima Mederos MD Visit date not found

## 2019-04-17 DIAGNOSIS — Z12.11 COLON CANCER SCREENING: ICD-10-CM

## 2019-06-25 ENCOUNTER — PATIENT MESSAGE (OUTPATIENT)
Dept: FAMILY MEDICINE | Facility: CLINIC | Age: 49
End: 2019-06-25

## 2019-06-26 ENCOUNTER — PATIENT MESSAGE (OUTPATIENT)
Dept: FAMILY MEDICINE | Facility: CLINIC | Age: 49
End: 2019-06-26

## 2019-07-02 ENCOUNTER — PATIENT MESSAGE (OUTPATIENT)
Dept: FAMILY MEDICINE | Facility: CLINIC | Age: 49
End: 2019-07-02

## 2019-07-03 NOTE — TELEPHONE ENCOUNTER
Please advise of a spot to fit her in I tried getting her scheduled with [facility] but she refused. Thank you

## 2019-07-11 ENCOUNTER — OFFICE VISIT (OUTPATIENT)
Dept: FAMILY MEDICINE | Facility: CLINIC | Age: 49
End: 2019-07-11
Payer: COMMERCIAL

## 2019-07-11 VITALS
SYSTOLIC BLOOD PRESSURE: 122 MMHG | BODY MASS INDEX: 44.93 KG/M2 | HEIGHT: 66 IN | DIASTOLIC BLOOD PRESSURE: 76 MMHG | HEART RATE: 74 BPM | WEIGHT: 279.56 LBS | OXYGEN SATURATION: 97 %

## 2019-07-11 DIAGNOSIS — Z12.11 COLON CANCER SCREENING: ICD-10-CM

## 2019-07-11 DIAGNOSIS — K63.9 COLON WALL THICKENING: ICD-10-CM

## 2019-07-11 DIAGNOSIS — K80.20 CALCULUS OF GALLBLADDER WITHOUT CHOLECYSTITIS WITHOUT OBSTRUCTION: ICD-10-CM

## 2019-07-11 DIAGNOSIS — N20.1 URETERAL STONE: Primary | ICD-10-CM

## 2019-07-11 PROCEDURE — 3074F PR MOST RECENT SYSTOLIC BLOOD PRESSURE < 130 MM HG: ICD-10-PCS | Mod: CPTII,S$GLB,, | Performed by: FAMILY MEDICINE

## 2019-07-11 PROCEDURE — 3008F PR BODY MASS INDEX (BMI) DOCUMENTED: ICD-10-PCS | Mod: CPTII,S$GLB,, | Performed by: FAMILY MEDICINE

## 2019-07-11 PROCEDURE — 3074F SYST BP LT 130 MM HG: CPT | Mod: CPTII,S$GLB,, | Performed by: FAMILY MEDICINE

## 2019-07-11 PROCEDURE — 3078F PR MOST RECENT DIASTOLIC BLOOD PRESSURE < 80 MM HG: ICD-10-PCS | Mod: CPTII,S$GLB,, | Performed by: FAMILY MEDICINE

## 2019-07-11 PROCEDURE — 3078F DIAST BP <80 MM HG: CPT | Mod: CPTII,S$GLB,, | Performed by: FAMILY MEDICINE

## 2019-07-11 PROCEDURE — 99999 PR PBB SHADOW E&M-EST. PATIENT-LVL III: ICD-10-PCS | Mod: PBBFAC,,, | Performed by: FAMILY MEDICINE

## 2019-07-11 PROCEDURE — 3008F BODY MASS INDEX DOCD: CPT | Mod: CPTII,S$GLB,, | Performed by: FAMILY MEDICINE

## 2019-07-11 PROCEDURE — 99214 OFFICE O/P EST MOD 30 MIN: CPT | Mod: S$GLB,,, | Performed by: FAMILY MEDICINE

## 2019-07-11 PROCEDURE — 99999 PR PBB SHADOW E&M-EST. PATIENT-LVL III: CPT | Mod: PBBFAC,,, | Performed by: FAMILY MEDICINE

## 2019-07-11 PROCEDURE — 99214 PR OFFICE/OUTPT VISIT, EST, LEVL IV, 30-39 MIN: ICD-10-PCS | Mod: S$GLB,,, | Performed by: FAMILY MEDICINE

## 2019-07-11 RX ORDER — KETOROLAC TROMETHAMINE 10 MG/1
10 TABLET, FILM COATED ORAL EVERY 6 HOURS PRN
Refills: 0 | Status: ON HOLD | COMMUNITY
Start: 2019-06-26 | End: 2020-03-28 | Stop reason: HOSPADM

## 2019-07-11 RX ORDER — TAMSULOSIN HYDROCHLORIDE 0.4 MG/1
CAPSULE ORAL
Refills: 0 | COMMUNITY
Start: 2019-06-26 | End: 2020-01-14

## 2019-07-11 NOTE — PROGRESS NOTES
Subjective:       Patient ID: Gail Mckinnon is a 49 y.o. female.    Chief Complaint: Hospital Follow Up    HPI     F/u from recent [facility] ER f/u for right uretral stone. Reports still having residual right cva pain with ps of 4-5/10.  Ct renal scan showed gallstones and thickening of sigmoid colon.  No abdominal pain, n/v/d.       Review of Systems      Review of Systems   Constitutional: Negative for fever and chills.   HENT: Negative for hearing loss and neck stiffness.    Eyes: Negative for redness and itching.   Respiratory: Negative for cough and choking.    Cardiovascular: Negative for chest pain and leg swelling.  Abdomen: Negative for abdominal pain and blood in stool.   Genitourinary: Negative for dysuria   Musculoskeletal: Negative for back pain and gait problem.   Neurological: Negative for light-headedness and headaches.   Hematological: Negative for adenopathy.   Psychiatric/Behavioral: Negative for behavioral problems.     Objective:      Physical Exam   Constitutional: She appears well-developed.   HENT:   Head: Normocephalic and atraumatic.   Eyes: Pupils are equal, round, and reactive to light. Conjunctivae are normal.   Neck: Normal range of motion.   Cardiovascular: Normal rate and regular rhythm.   No murmur heard.  Pulmonary/Chest: Effort normal and breath sounds normal.   Abdominal: Soft. Bowel sounds are normal.   Right cva tenderness.    Lymphadenopathy:     She has no cervical adenopathy.       Assessment:       1. Ureteral stone    2. Colon cancer screening    3. Colon wall thickening    4. Calculus of gallbladder without cholecystitis without obstruction        Plan:       Ureteral stone    Colon cancer screening  -     Case request GI: COLONOSCOPY    Colon wall thickening    Calculus of gallbladder without cholecystitis without obstruction            Plan:  F/u with Dr. Davenport, urology  See order        Medication List with Changes/Refills   Current Medications    AZITHROMYCIN  (ZITHROMAX Z-ELISE) 250 MG TABLET    Take 2 tabs daily for first day, then 1 tab daily for next 4 days.    BUPROPION (WELLBUTRIN SR) 150 MG TBSR 12 HR TABLET    Take 1 tablet (150 mg total) by mouth 2 (two) times daily.    BUSPIRONE (BUSPAR) 10 MG TABLET    Take 1 tablet (10 mg total) by mouth 2 (two) times daily.    CYCLOBENZAPRINE (FLEXERIL) 10 MG TABLET    TAKE ONE TABLET BY MOUTH NIGHTLY AS NEEDED FOR MUSCLE SPASMS    HYDROCODONE-ACETAMINOPHEN (NORCO) 5-325 MG PER TABLET    Take 1 tablet by mouth every 6 (six) hours as needed for Pain.    KETOROLAC (TORADOL) 10 MG TABLET    Take 10 mg by mouth every 6 (six) hours as needed.    LEVOTHYROXINE (SYNTHROID) 200 MCG TABLET    Take 1 tablet (200 mcg total) by mouth before breakfast.    NAPROXEN (NAPROSYN) 500 MG TABLET    Take 1 tablet (500 mg total) by mouth 2 (two) times daily with meals.    OMEPRAZOLE (PRILOSEC) 40 MG CAPSULE    Take 1 capsule (40 mg total) by mouth once daily. Please call clinic to schedule appointment and labs;Last fill.    ONDANSETRON (ZOFRAN) 4 MG TABLET    Take 1 tablet (4 mg total) by mouth every 8 (eight) hours as needed for Nausea.    PREDNISONE (DELTASONE) 10 MG TABLET    Take 4 tabs daily for 2 days then Take 3 tabs daily for 2 days thenTake 2 tabs daily for 2 days then Take 1 tab daily for 2 days then stop    TAMSULOSIN (FLOMAX) 0.4 MG CAP    TAKE 1 CAPSULE BY MOUTH ONCE DAILY ONE HALF HOUR FOLLOWING THE SAME MEAL EACH DAY

## 2019-08-29 DIAGNOSIS — F32.A DEPRESSION, UNSPECIFIED DEPRESSION TYPE: Primary | ICD-10-CM

## 2019-09-02 RX ORDER — BUPROPION HYDROCHLORIDE 150 MG/1
150 TABLET, EXTENDED RELEASE ORAL 2 TIMES DAILY
Qty: 180 TABLET | Refills: 0 | Status: SHIPPED | OUTPATIENT
Start: 2019-09-02 | End: 2019-11-14 | Stop reason: SDUPTHER

## 2019-09-09 ENCOUNTER — PATIENT MESSAGE (OUTPATIENT)
Dept: ENDOCRINOLOGY | Facility: CLINIC | Age: 49
End: 2019-09-09

## 2019-09-11 RX ORDER — LEVOTHYROXINE SODIUM 200 UG/1
TABLET ORAL
Qty: 90 TABLET | Refills: 3 | Status: SHIPPED | OUTPATIENT
Start: 2019-09-11 | End: 2020-09-08

## 2019-09-16 ENCOUNTER — PATIENT MESSAGE (OUTPATIENT)
Dept: ENDOCRINOLOGY | Facility: CLINIC | Age: 49
End: 2019-09-16

## 2019-09-18 ENCOUNTER — PATIENT OUTREACH (OUTPATIENT)
Dept: ADMINISTRATIVE | Facility: HOSPITAL | Age: 49
End: 2019-09-18

## 2019-11-14 DIAGNOSIS — F32.A DEPRESSION, UNSPECIFIED DEPRESSION TYPE: ICD-10-CM

## 2019-11-14 RX ORDER — BUPROPION HYDROCHLORIDE 150 MG/1
150 TABLET, EXTENDED RELEASE ORAL 2 TIMES DAILY
Qty: 180 TABLET | Refills: 0 | Status: SHIPPED | OUTPATIENT
Start: 2019-11-14 | End: 2020-09-08

## 2019-11-14 NOTE — PROGRESS NOTES
Refill Authorization Note     is requesting a refill authorization.    Brief assessment and rationale for refill: ROUTE: op(buspar)/APPROVE  Name and strength of medication: busPIRone (BUSPAR) 10MG tablet/buPROPion (WELLBUTRIN SR) 150MG TBSR 12 hr tablet  Medication-related problems identified: Non-adherence - intentional    Medication Therapy Plan: Per [facility] bupropion 150mg last filled 09/19 for 3 months/Outside of protocol(buspar), route to you/ Approve wellbutrin 3 more months    Medication reconciliation completed: Yes              How patient will take medication: 1t po bid/1t po bid          Comments:   Requested Prescriptions   Pending Prescriptions Disp Refills    busPIRone (BUSPAR) 10 MG tablet 180 tablet 0     Sig: Take 1 tablet (10 mg total) by mouth 2 (two) times daily.       Anxiolytics Refill Protocol Failed - 11/14/2019  9:49 AM        Failed - Patient seen within 3 months     Last visit with Tima Mederos MD: 7/11/2019  Last visit in [facility]: 7/11/2019    Patient's next visit in [facility]: Visit date not found           Passed - Patient not pregnant        Passed - Med not refilled within 4 weeks       buPROPion (WELLBUTRIN SR) 150 MG TBSR 12 hr tablet 180 tablet 0     Sig: Take 1 tablet (150 mg total) by mouth 2 (two) times daily.       Psychiatry: Antidepressants - bupropion Passed - 11/14/2019  9:49 AM        Passed - Patient is at least 18 years old        Passed - Last BP in normal range within 360 days     BP Readings from Last 3 Encounters:   07/11/19 122/76   06/23/19 (!) 141/79   11/27/18 124/70              Passed - Office visit in past 6 months or future 90 days     Recent Outpatient Visits            4 months ago Ureteral stone    [facility] Tima Mederos MD    11 months ago Routine medical exam    [facility] Erin Mederos MD    12 months ago Viral pharyngitis    [facility] Catherine Castillo NP    1 year ago Hypothyroidism, unspecified type    [facility] Akil Moss DO    1 year ago Acute midline low back pain without sciatica    [facility] Tima Mederos MD

## 2019-11-15 RX ORDER — BUSPIRONE HYDROCHLORIDE 10 MG/1
10 TABLET ORAL 2 TIMES DAILY
Qty: 180 TABLET | Refills: 0 | Status: SHIPPED | OUTPATIENT
Start: 2019-11-15 | End: 2020-09-09

## 2019-11-15 NOTE — TELEPHONE ENCOUNTER
Please see the following assessment. This refill request still requires some action on your part.       Buspirone last prescribed 5/18 for 6-month supply. Adherence data indicates no recent fills. Defer request to you.       Thank you.

## 2019-11-16 ENCOUNTER — TELEPHONE (OUTPATIENT)
Dept: GASTROENTEROLOGY | Facility: CLINIC | Age: 49
End: 2019-11-16

## 2019-12-31 ENCOUNTER — TELEPHONE (OUTPATIENT)
Dept: GASTROENTEROLOGY | Facility: CLINIC | Age: 49
End: 2019-12-31

## 2019-12-31 ENCOUNTER — PATIENT MESSAGE (OUTPATIENT)
Dept: GASTROENTEROLOGY | Facility: CLINIC | Age: 49
End: 2019-12-31

## 2019-12-31 RX ORDER — CYCLOBENZAPRINE HCL 10 MG
10 TABLET ORAL NIGHTLY
Qty: 30 TABLET | Refills: 0 | Status: SHIPPED | OUTPATIENT
Start: 2019-12-31 | End: 2020-05-21 | Stop reason: SDUPTHER

## 2020-01-09 ENCOUNTER — PATIENT MESSAGE (OUTPATIENT)
Dept: GASTROENTEROLOGY | Facility: CLINIC | Age: 50
End: 2020-01-09

## 2020-01-14 ENCOUNTER — PATIENT MESSAGE (OUTPATIENT)
Dept: FAMILY MEDICINE | Facility: CLINIC | Age: 50
End: 2020-01-14

## 2020-01-14 ENCOUNTER — OFFICE VISIT (OUTPATIENT)
Dept: FAMILY MEDICINE | Facility: CLINIC | Age: 50
End: 2020-01-14
Payer: COMMERCIAL

## 2020-01-14 VITALS
WEIGHT: 264.56 LBS | HEART RATE: 94 BPM | SYSTOLIC BLOOD PRESSURE: 124 MMHG | BODY MASS INDEX: 42.52 KG/M2 | OXYGEN SATURATION: 98 % | TEMPERATURE: 99 F | DIASTOLIC BLOOD PRESSURE: 76 MMHG | HEIGHT: 66 IN

## 2020-01-14 DIAGNOSIS — J01.90 ACUTE BACTERIAL SINUSITIS: Primary | ICD-10-CM

## 2020-01-14 DIAGNOSIS — B96.89 ACUTE BACTERIAL SINUSITIS: Primary | ICD-10-CM

## 2020-01-14 PROCEDURE — 99213 OFFICE O/P EST LOW 20 MIN: CPT | Mod: S$GLB,,, | Performed by: NURSE PRACTITIONER

## 2020-01-14 PROCEDURE — 3074F SYST BP LT 130 MM HG: CPT | Mod: CPTII,S$GLB,, | Performed by: NURSE PRACTITIONER

## 2020-01-14 PROCEDURE — 3078F PR MOST RECENT DIASTOLIC BLOOD PRESSURE < 80 MM HG: ICD-10-PCS | Mod: CPTII,S$GLB,, | Performed by: NURSE PRACTITIONER

## 2020-01-14 PROCEDURE — 99999 PR PBB SHADOW E&M-EST. PATIENT-LVL IV: CPT | Mod: PBBFAC,,, | Performed by: NURSE PRACTITIONER

## 2020-01-14 PROCEDURE — 3008F PR BODY MASS INDEX (BMI) DOCUMENTED: ICD-10-PCS | Mod: CPTII,S$GLB,, | Performed by: NURSE PRACTITIONER

## 2020-01-14 PROCEDURE — 99213 PR OFFICE/OUTPT VISIT, EST, LEVL III, 20-29 MIN: ICD-10-PCS | Mod: S$GLB,,, | Performed by: NURSE PRACTITIONER

## 2020-01-14 PROCEDURE — 3074F PR MOST RECENT SYSTOLIC BLOOD PRESSURE < 130 MM HG: ICD-10-PCS | Mod: CPTII,S$GLB,, | Performed by: NURSE PRACTITIONER

## 2020-01-14 PROCEDURE — 3078F DIAST BP <80 MM HG: CPT | Mod: CPTII,S$GLB,, | Performed by: NURSE PRACTITIONER

## 2020-01-14 PROCEDURE — 99999 PR PBB SHADOW E&M-EST. PATIENT-LVL IV: ICD-10-PCS | Mod: PBBFAC,,, | Performed by: NURSE PRACTITIONER

## 2020-01-14 PROCEDURE — 3008F BODY MASS INDEX DOCD: CPT | Mod: CPTII,S$GLB,, | Performed by: NURSE PRACTITIONER

## 2020-01-14 RX ORDER — AZELASTINE 1 MG/ML
1 SPRAY, METERED NASAL 2 TIMES DAILY
Qty: 30 ML | Refills: 0 | Status: SHIPPED | OUTPATIENT
Start: 2020-01-14 | End: 2020-03-18

## 2020-01-14 RX ORDER — METHYLPREDNISOLONE 4 MG/1
TABLET ORAL
Qty: 1 PACKAGE | Refills: 0 | Status: SHIPPED | OUTPATIENT
Start: 2020-01-14 | End: 2020-02-04

## 2020-01-14 RX ORDER — PROMETHAZINE HYDROCHLORIDE AND DEXTROMETHORPHAN HYDROBROMIDE 6.25; 15 MG/5ML; MG/5ML
5 SYRUP ORAL 3 TIMES DAILY PRN
Qty: 240 ML | Refills: 0 | Status: SHIPPED | OUTPATIENT
Start: 2020-01-14 | End: 2020-01-24

## 2020-01-14 RX ORDER — DOXYCYCLINE HYCLATE 100 MG
100 TABLET ORAL 2 TIMES DAILY
Qty: 20 TABLET | Refills: 0 | Status: ON HOLD | OUTPATIENT
Start: 2020-01-14 | End: 2020-02-12

## 2020-01-14 NOTE — PROGRESS NOTES
Subjective:       Patient ID: Gail Mckinnon is a 49 y.o. female.    Chief Complaint: URI    Patient has had URI symptoms for about 2 weeks, taking OTC Nyquil and dayqile. Cough is worse, sinuses drianaing.    Past Medical History:  No date: Anxiety  No date: Depression  No date: Hypothyroid  No date: Menorrhagia  2012: Multinodular goiter  No date: Palpitation  No date: Venous insufficiency    Past Surgical History:  No date:  SECTION, CLASSIC  No date: tonsillectomy    Review of patient's family history indicates:  Problem: Diabetes      Relation: Mother          Age of Onset: 65  Problem: Cancer      Relation: Maternal Aunt          Age of Onset: (Not Specified)          Comment: lung cancer  Problem: Cancer      Relation: Maternal Grandmother          Age of Onset: (Not Specified)          Comment: stomach cancer      Social History    Socioeconomic History      Marital status:       Spouse name: Not on file      Number of children: Not on file      Years of education: Not on file      Highest education level: Not on file    Occupational History      Not on file    Social Needs      Financial resource strain: Not on file      Food insecurity:        Worry: Not on file        Inability: Not on file      Transportation needs:        Medical: Not on file        Non-medical: Not on file    Tobacco Use      Smoking status: Never Smoker    Substance and Sexual Activity      Alcohol use: No      Drug use: Not on file      Sexual activity: Not on file    Lifestyle      Physical activity:        Days per week: Not on file        Minutes per session: Not on file      Stress: Not on file    Relationships      Social connections:        Talks on phone: Not on file        Gets together: Not on file        Attends [employer] service: Not on file        Active member of club or organization: Not on file        Attends meetings of clubs or organizations: Not on file        Relationship status: Not on  file    Other Topics      Concerns:        Not on file    Social History Narrative      Not on file      Current Outpatient Medications:  buPROPion (WELLBUTRIN SR) 150 MG TBSR 12 hr tablet, Take 1 tablet (150 mg total) by mouth 2 (two) times daily., Disp: 180 tablet, Rfl: 0  busPIRone (BUSPAR) 10 MG tablet, Take 1 tablet (10 mg total) by mouth 2 (two) times daily. (Patient taking differently: Take 10 mg by mouth as needed. ), Disp: 180 tablet, Rfl: 0  cyclobenzaprine (FLEXERIL) 10 MG tablet, Take 1 tablet (10 mg total) by mouth nightly. (Patient taking differently: Take 10 mg by mouth as needed. ), Disp: 30 tablet, Rfl: 0  ketorolac (TORADOL) 10 mg tablet, Take 10 mg by mouth every 6 (six) hours as needed., Disp: , Rfl: 0  levothyroxine (SYNTHROID) 200 MCG tablet, TAKE 1 TABLET BY MOUTH ONCE DAILY BEFORE BREAKFAST, Disp: 90 tablet, Rfl: 3  naproxen (NAPROSYN) 500 MG tablet, Take 1 tablet (500 mg total) by mouth 2 (two) times daily with meals. (Patient taking differently: Take 500 mg by mouth as needed. ), Disp: 15 tablet, Rfl: 0  ondansetron (ZOFRAN) 4 MG tablet, Take 1 tablet (4 mg total) by mouth every 8 (eight) hours as needed for Nausea., Disp: 15 tablet, Rfl: 0  azithromycin (ZITHROMAX Z-ELISE) 250 MG tablet, Take 2 tabs daily for first day, then 1 tab daily for next 4 days., Disp: 6 tablet, Rfl: 0  HYDROcodone-acetaminophen (NORCO) 5-325 mg per tablet, Take 1 tablet by mouth every 6 (six) hours as needed for Pain., Disp: 12 tablet, Rfl: 0  omeprazole (PRILOSEC) 40 MG capsule, Take 1 capsule (40 mg total) by mouth once daily. Please call clinic to schedule appointment and labs;Last fill., Disp: 30 capsule, Rfl: 5  predniSONE (DELTASONE) 10 MG tablet, Take 4 tabs daily for 2 days then Take 3 tabs daily for 2 days thenTake 2 tabs daily for 2 days then Take 1 tab daily for 2 days then stop, Disp: 20 tablet, Rfl: 0  tamsulosin (FLOMAX) 0.4 mg Cap, TAKE 1 CAPSULE BY MOUTH ONCE DAILY ONE HALF HOUR FOLLOWING THE SAME  MEAL EACH DAY, Disp: , Rfl: 0    No current facility-administered medications for this visit.       Review of patient's allergies indicates:   -- Penicillin g     --  unknown   -- Penicillins     --  Other reaction(s): Unknown    Review of Systems   Constitutional: Positive for chills, diaphoresis, fatigue and fever.   HENT: Positive for postnasal drip, rhinorrhea, sinus pressure, sinus pain and sore throat. Negative for ear pain.    Respiratory: Positive for cough and wheezing.    Cardiovascular: Negative.    Gastrointestinal: Positive for nausea. Negative for abdominal pain, constipation, diarrhea and vomiting.   Genitourinary: Negative.    Neurological: Positive for headaches.       Objective:      Physical Exam   Constitutional: She is oriented to person, place, and time. No distress.   HENT:   Head: Head is without raccoon's eyes and without Ca's sign.   Right Ear: No middle ear effusion.   Left Ear:  No middle ear effusion.   Nose: Mucosal edema and rhinorrhea present.   Mouth/Throat: Posterior oropharyngeal erythema present.   Eyes: Pupils are equal, round, and reactive to light.   Cardiovascular: Normal rate and regular rhythm.   Pulmonary/Chest: Effort normal and breath sounds normal. No stridor. No respiratory distress.   Abdominal: Soft. Bowel sounds are normal. She exhibits no distension. There is no tenderness.   Musculoskeletal: She exhibits no edema.   Neurological: She is alert and oriented to person, place, and time.   Skin: She is not diaphoretic.   Psychiatric: She has a normal mood and affect. Her behavior is normal.       Assessment:       1. Acute bacterial sinusitis        Plan:       1. Acute bacterial sinusitis  Follow up if not resolving.   - methylPREDNISolone (MEDROL DOSEPACK) 4 mg tablet; use as directed  Dispense: 1 Package; Refill: 0  - promethazine-dextromethorphan (PROMETHAZINE-DM) 6.25-15 mg/5 mL Syrp; Take 5 mLs by mouth 3 (three) times daily as needed.  Dispense: 240 mL;  Refill: 0  - azelastine (ASTELIN) 137 mcg (0.1 %) nasal spray; 1 spray (137 mcg total) by Nasal route 2 (two) times daily.  Dispense: 30 mL; Refill: 0  - doxycycline (VIBRA-TABS) 100 MG tablet; Take 1 tablet (100 mg total) by mouth 2 (two) times daily.  Dispense: 20 tablet; Refill: 0

## 2020-01-17 ENCOUNTER — TELEPHONE (OUTPATIENT)
Dept: ADMINISTRATIVE | Facility: HOSPITAL | Age: 50
End: 2020-01-17

## 2020-01-17 DIAGNOSIS — Z12.39 BREAST CANCER SCREENING: ICD-10-CM

## 2020-02-12 ENCOUNTER — ANESTHESIA (OUTPATIENT)
Dept: ENDOSCOPY | Facility: HOSPITAL | Age: 50
End: 2020-02-12
Payer: COMMERCIAL

## 2020-02-12 ENCOUNTER — HOSPITAL ENCOUNTER (OUTPATIENT)
Facility: HOSPITAL | Age: 50
Discharge: HOME | End: 2020-02-12
Attending: INTERNAL MEDICINE | Admitting: INTERNAL MEDICINE
Payer: COMMERCIAL

## 2020-02-12 ENCOUNTER — ANESTHESIA EVENT (OUTPATIENT)
Dept: ENDOSCOPY | Facility: HOSPITAL | Age: 50
End: 2020-02-12
Payer: COMMERCIAL

## 2020-02-12 DIAGNOSIS — Z12.11 SCREEN FOR COLON CANCER: ICD-10-CM

## 2020-02-12 LAB
B-HCG UR QL: NEGATIVE
CTP QC/QA: YES

## 2020-02-12 PROCEDURE — D9220A PRA ANESTHESIA: Mod: 33,CRNA,, | Performed by: NURSE ANESTHETIST, CERTIFIED REGISTERED

## 2020-02-12 PROCEDURE — D9220A PRA ANESTHESIA: Mod: 33,ANES,, | Performed by: ANESTHESIOLOGY

## 2020-02-12 PROCEDURE — 88305 TISSUE EXAM BY PATHOLOGIST: ICD-10-PCS | Mod: 26,,, | Performed by: PATHOLOGY

## 2020-02-12 PROCEDURE — 81025 URINE PREGNANCY TEST: CPT | Mod: PO | Performed by: INTERNAL MEDICINE

## 2020-02-12 PROCEDURE — 88342 CHG IMMUNOCYTOCHEMISTRY: ICD-10-PCS | Mod: 26,,, | Performed by: PATHOLOGY

## 2020-02-12 PROCEDURE — 88305 TISSUE EXAM BY PATHOLOGIST: CPT | Performed by: PATHOLOGY

## 2020-02-12 PROCEDURE — 63600175 PHARM REV CODE 636 W HCPCS: Mod: PO | Performed by: NURSE ANESTHETIST, CERTIFIED REGISTERED

## 2020-02-12 PROCEDURE — 63600175 PHARM REV CODE 636 W HCPCS: Mod: PO | Performed by: INTERNAL MEDICINE

## 2020-02-12 PROCEDURE — 45381 PR COLONOSCPY,FLEX,W/DIR SUBMUC INJECT: ICD-10-PCS | Mod: 51,,, | Performed by: INTERNAL MEDICINE

## 2020-02-12 PROCEDURE — 88305 TISSUE EXAM BY PATHOLOGIST: CPT | Mod: 26,,, | Performed by: PATHOLOGY

## 2020-02-12 PROCEDURE — 45381 COLONOSCOPY SUBMUCOUS NJX: CPT | Mod: 51,,, | Performed by: INTERNAL MEDICINE

## 2020-02-12 PROCEDURE — 88342 IMHCHEM/IMCYTCHM 1ST ANTB: CPT | Performed by: PATHOLOGY

## 2020-02-12 PROCEDURE — 88341 IMHCHEM/IMCYTCHM EA ADD ANTB: CPT | Mod: 26,,, | Performed by: PATHOLOGY

## 2020-02-12 PROCEDURE — D9220A PRA ANESTHESIA: ICD-10-PCS | Mod: 33,ANES,, | Performed by: ANESTHESIOLOGY

## 2020-02-12 PROCEDURE — 37000009 HC ANESTHESIA EA ADD 15 MINS: Mod: PO | Performed by: INTERNAL MEDICINE

## 2020-02-12 PROCEDURE — 37000008 HC ANESTHESIA 1ST 15 MINUTES: Mod: PO | Performed by: INTERNAL MEDICINE

## 2020-02-12 PROCEDURE — 88342 IMHCHEM/IMCYTCHM 1ST ANTB: CPT | Mod: 26,,, | Performed by: PATHOLOGY

## 2020-02-12 PROCEDURE — 88341 PR IHC OR ICC EACH ADD'L SINGLE ANTIBODY  STAINPR: ICD-10-PCS | Mod: 26,,, | Performed by: PATHOLOGY

## 2020-02-12 PROCEDURE — 45380 COLONOSCOPY AND BIOPSY: CPT | Mod: 33,,, | Performed by: INTERNAL MEDICINE

## 2020-02-12 PROCEDURE — 88341 IMHCHEM/IMCYTCHM EA ADD ANTB: CPT | Mod: 59 | Performed by: PATHOLOGY

## 2020-02-12 PROCEDURE — 45380 PR COLONOSCOPY,BIOPSY: ICD-10-PCS | Mod: 33,,, | Performed by: INTERNAL MEDICINE

## 2020-02-12 PROCEDURE — D9220A PRA ANESTHESIA: ICD-10-PCS | Mod: 33,CRNA,, | Performed by: NURSE ANESTHETIST, CERTIFIED REGISTERED

## 2020-02-12 PROCEDURE — 45380 COLONOSCOPY AND BIOPSY: CPT | Mod: PO | Performed by: INTERNAL MEDICINE

## 2020-02-12 PROCEDURE — 27201012 HC FORCEPS, HOT/COLD, DISP: Mod: PO | Performed by: INTERNAL MEDICINE

## 2020-02-12 PROCEDURE — 27201028 HC NEEDLE, SCLERO: Mod: PO | Performed by: INTERNAL MEDICINE

## 2020-02-12 PROCEDURE — 45381 COLONOSCOPY SUBMUCOUS NJX: CPT | Mod: PO | Performed by: INTERNAL MEDICINE

## 2020-02-12 RX ORDER — LIDOCAINE HCL/PF 100 MG/5ML
SYRINGE (ML) INTRAVENOUS
Status: DISCONTINUED | OUTPATIENT
Start: 2020-02-12 | End: 2020-02-12

## 2020-02-12 RX ORDER — SODIUM CHLORIDE 0.9 % (FLUSH) 0.9 %
10 SYRINGE (ML) INJECTION
Status: DISCONTINUED | OUTPATIENT
Start: 2020-02-12 | End: 2020-02-12 | Stop reason: HOSPADM

## 2020-02-12 RX ORDER — SODIUM CHLORIDE, SODIUM LACTATE, POTASSIUM CHLORIDE, CALCIUM CHLORIDE 600; 310; 30; 20 MG/100ML; MG/100ML; MG/100ML; MG/100ML
INJECTION, SOLUTION INTRAVENOUS CONTINUOUS
Status: DISCONTINUED | OUTPATIENT
Start: 2020-02-12 | End: 2020-02-12 | Stop reason: HOSPADM

## 2020-02-12 RX ORDER — PROPOFOL 10 MG/ML
VIAL (ML) INTRAVENOUS
Status: DISCONTINUED | OUTPATIENT
Start: 2020-02-12 | End: 2020-02-12

## 2020-02-12 RX ADMIN — SODIUM CHLORIDE, SODIUM LACTATE, POTASSIUM CHLORIDE, AND CALCIUM CHLORIDE: .6; .31; .03; .02 INJECTION, SOLUTION INTRAVENOUS at 10:02

## 2020-02-12 RX ADMIN — PROPOFOL 50 MG: 10 INJECTION, EMULSION INTRAVENOUS at 11:02

## 2020-02-12 RX ADMIN — PROPOFOL 30 MG: 10 INJECTION, EMULSION INTRAVENOUS at 11:02

## 2020-02-12 RX ADMIN — LIDOCAINE HYDROCHLORIDE 100 MG: 20 INJECTION, SOLUTION INTRAVENOUS at 11:02

## 2020-02-12 RX ADMIN — PROPOFOL 130 MG: 10 INJECTION, EMULSION INTRAVENOUS at 11:02

## 2020-02-12 NOTE — ANESTHESIA POSTPROCEDURE EVALUATION
Anesthesia Post Evaluation    Patient: Gail Mckinnon    Procedure(s) Performed: Procedure(s) (LRB):  COLONOSCOPY (N/A)    Final Anesthesia Type: general    Patient location during evaluation: PACU  Patient participation: Yes- Able to Participate  Level of consciousness: sedated and awake  Post-procedure vital signs: reviewed and stable  Pain management: adequate  Airway patency: patent    PONV status at discharge: No PONV  Anesthetic complications: no      Cardiovascular status: blood pressure returned to baseline  Respiratory status: spontaneous ventilation  Hydration status: euvolemic  Follow-up not needed.          Vitals Value Taken Time   /72 2/12/2020 11:48 AM   Temp 36.4 °C (97.5 °F) 2/12/2020 11:48 AM   Pulse 98 2/12/2020 11:48 AM   Resp 18 2/12/2020 11:48 AM   SpO2 98 % 2/12/2020 11:48 AM         No case tracking events are documented in the log.      Pain/Aggie Score: Aggie Score: 9 (2/12/2020 11:48 AM)

## 2020-02-12 NOTE — [employer] PATIENT INSTRUCTIONS
Discharge Summary/Instructions after an Endoscopic Procedure  Patient Name: Gail Mckinnon  Patient MRN: [id number]  Patient   YOB: 1970 Wednesday, February 12, 2020  Shane Parker MD  RESTRICTIONS:  During your procedure today, you received medications for sedation.  These   medications may affect your judgment, balance and coordination.  Therefore,   for 24 hours, you have the following restrictions:   - DO NOT drive a car, operate machinery, make legal/financial decisions,   sign important papers or drink alcohol.    ACTIVITY:  Today: no heavy lifting, straining or running due to procedural   sedation/anesthesia.  The following day: return to full activity including work.  DIET:  Eat and drink normally unless instructed otherwise.     TREATMENT FOR COMMON SIDE EFFECTS:  - Mild abdominal pain, nausea, belching, bloating or excessive gas:  rest,   eat lightly and use a heating pad.  - Sore Throat: treat with throat lozenges and/or gargle with warm salt   water.  - Because air was used during the procedure, expelling large amounts of air   from your rectum or belching is normal.  - If a bowel prep was taken, you may not have a bowel movement for 1-3 days.    This is normal.  SYMPTOMS TO WATCH FOR AND REPORT TO YOUR PHYSICIAN:  1. Abdominal pain or bloating, other than gas cramps.  2. Chest pain.  3. Back pain.  4. Signs of infection such as: chills or fever occurring within 24 hours   after the procedure.  5. Rectal bleeding, which would show as bright red, maroon, or black stools.   (A tablespoon of blood from the rectum is not serious, especially if   hemorrhoids are present.)  6. Vomiting.  7. Weakness or dizziness.  GO DIRECTLY TO THE NEAREST EMERGENCY ROOM IF YOU HAVE ANY OF THE FOLLOWING:      Difficulty breathing              Chills and/or fever over 101 F   Persistent vomiting and/or vomiting blood   Severe abdominal pain   Severe chest pain   Black, tarry stools   Bleeding- more than  one tablespoon   Any other symptom or condition that you feel may need urgent attention  Your doctor recommends these additional instructions:  If any biopsies were taken, your doctors clinic will contact you in 1 to 2   weeks with any results.  We are waiting for your pathology results.   Your physician has recommended a CT scan (computed tomography) of the   abdomen with contrast at appointment to be scheduled.   An appointment has been made (or make an appointment) to see a colo-rectal   surgeon at appointment to be scheduled.   Your physician has recommended a repeat colonoscopy (date to be determined   after pending pathology results are reviewed) for surveillance.   You are being discharged to home.  For questions, problems or results please call your physician - Shane Parker MD at Work:  [phone].  EMERGENCY PHONE NUMBER: [phone], LAB RESULTS: [phone]  IF A COMPLICATION OR EMERGENCY SITUATION ARISES AND YOU ARE UNABLE TO REACH   YOUR PHYSICIAN - GO DIRECTLY TO THE EMERGENCY ROOM.  ___________________________________________  Nurse Signature  ___________________________________________  Patient/Designated Responsible Party Signature  Shane Parker MD  2/12/2020 11:53:37 AM  This report has been verified and signed electronically.  [employer]

## 2020-02-12 NOTE — DISCHARGE SUMMARY
Discharge Note  Short Stay      SUMMARY     Admit Date: 2/12/2020    Attending Physician: Shane Parker MD     Discharge Physician: Shane Parker MD    Discharge Date: 2/12/2020 11:54 AM    Final Diagnosis: Screening [Z13.9]    Disposition: HOME OR SELF CARE    Patient Instructions:   Current Discharge Medication List      CONTINUE these medications which have NOT CHANGED    Details   buPROPion (WELLBUTRIN SR) 150 MG TBSR 12 hr tablet Take 1 tablet (150 mg total) by mouth 2 (two) times daily.  Qty: 180 tablet, Refills: 0    Associated Diagnoses: Depression, unspecified depression type      busPIRone (BUSPAR) 10 MG tablet Take 1 tablet (10 mg total) by mouth 2 (two) times daily.  Qty: 180 tablet, Refills: 0      levothyroxine (SYNTHROID) 200 MCG tablet TAKE 1 TABLET BY MOUTH ONCE DAILY BEFORE BREAKFAST  Qty: 90 tablet, Refills: 3      naproxen (NAPROSYN) 500 MG tablet Take 1 tablet (500 mg total) by mouth 2 (two) times daily with meals.  Qty: 15 tablet, Refills: 0      azelastine (ASTELIN) 137 mcg (0.1 %) nasal spray 1 spray (137 mcg total) by Nasal route 2 (two) times daily.  Qty: 30 mL, Refills: 0    Associated Diagnoses: Acute bacterial sinusitis      cyclobenzaprine (FLEXERIL) 10 MG tablet Take 1 tablet (10 mg total) by mouth nightly.  Qty: 30 tablet, Refills: 0    Comments: Please consider 90 day supplies to promote better adherence      ketorolac (TORADOL) 10 mg tablet Take 10 mg by mouth every 6 (six) hours as needed.  Refills: 0      ondansetron (ZOFRAN) 4 MG tablet Take 1 tablet (4 mg total) by mouth every 8 (eight) hours as needed for Nausea.  Qty: 15 tablet, Refills: 0         STOP taking these medications       doxycycline (VIBRA-TABS) 100 MG tablet Comments:   Reason for Stopping:               Discharge Procedure Orders (must include Diet, Follow-up, Activity)    Follow Up:  Follow up with PCP as previously scheduled  Resume routine diet.  Activity as tolerated.    No driving day of  procedure.

## 2020-02-12 NOTE — TRANSFER OF CARE
"Anesthesia Transfer of Care Note    Patient: Gail Mckinnon    Procedure(s) Performed: Procedure(s) (LRB):  COLONOSCOPY (N/A)    Patient location: PACU    Anesthesia Type: general    Transport from OR: Transported from OR on room air with adequate spontaneous ventilation    Post pain: adequate analgesia    Post assessment: no apparent anesthetic complications and tolerated procedure well    Post vital signs: stable    Level of consciousness: awake and sedated    Nausea/Vomiting: no nausea/vomiting    Complications: none    Transfer of care protocol was followed      Last vitals:   Visit Vitals  /80   Pulse 103   Temp 36.6 °C (97.9 °F) (Skin)   Resp 17   Ht 5' 6" (1.676 m)   Wt 120.2 kg (265 lb)   LMP 12/01/2019 (Approximate)   SpO2 97%   Breastfeeding? No   BMI 42.77 kg/m²     "

## 2020-02-12 NOTE — DISCHARGE INSTRUCTIONS

## 2020-02-12 NOTE — H&P
History & Physical - Short Stay  Gastroenterology      SUBJECTIVE:     Procedure: Colonoscopy    Chief Complaint/Indication for Procedure: Screening    PTA Medications   Medication Sig    buPROPion (WELLBUTRIN SR) 150 MG TBSR 12 hr tablet Take 1 tablet (150 mg total) by mouth 2 (two) times daily.    busPIRone (BUSPAR) 10 MG tablet Take 1 tablet (10 mg total) by mouth 2 (two) times daily. (Patient taking differently: Take 10 mg by mouth as needed. )    levothyroxine (SYNTHROID) 200 MCG tablet TAKE 1 TABLET BY MOUTH ONCE DAILY BEFORE BREAKFAST    naproxen (NAPROSYN) 500 MG tablet Take 1 tablet (500 mg total) by mouth 2 (two) times daily with meals. (Patient taking differently: Take 500 mg by mouth as needed. )    azelastine (ASTELIN) 137 mcg (0.1 %) nasal spray 1 spray (137 mcg total) by Nasal route 2 (two) times daily.    cyclobenzaprine (FLEXERIL) 10 MG tablet Take 1 tablet (10 mg total) by mouth nightly. (Patient taking differently: Take 10 mg by mouth as needed. )    ketorolac (TORADOL) 10 mg tablet Take 10 mg by mouth every 6 (six) hours as needed.    ondansetron (ZOFRAN) 4 MG tablet Take 1 tablet (4 mg total) by mouth every 8 (eight) hours as needed for Nausea.    [DISCONTINUED] doxycycline (VIBRA-TABS) 100 MG tablet Take 1 tablet (100 mg total) by mouth 2 (two) times daily.       Review of patient's allergies indicates:   Allergen Reactions    Penicillin g      unknown    Penicillins      Other reaction(s): Unknown        Past Medical History:   Diagnosis Date    Anxiety     Depression     Hypothyroid     Kidney calculi     Menorrhagia     Multinodular goiter 2012    Palpitation     Venous insufficiency      Past Surgical History:   Procedure Laterality Date     SECTION, CLASSIC      tonsillectomy       Family History   Problem Relation Age of Onset    Diabetes Mother 65    Cancer Maternal Aunt         lung cancer    Cancer Maternal Grandmother         stomach cancer      Social History     Tobacco Use    Smoking status: Never Smoker   Substance Use Topics    Alcohol use: Yes     Comment: occasionally    Drug use: Never         OBJECTIVE:     Vital Signs (Most Recent)       Physical Exam                                                        GENERAL:  Comfortable, in no acute distress.                                 HEENT EXAM:  Nonicteric.  No adenopathy.  Oropharynx is clear.               NECK:  Supple.                                                               LUNGS:  Clear.                                                               CARDIAC:  Regular rate and rhythm.  S1, S2.  No murmur.                      ABDOMEN:  Soft, positive bowel sounds, nontender.  No hepatosplenomegaly or masses.  No rebound or guarding.                                             EXTREMITIES:  No edema.     MENTAL STATUS:  Normal, alert and oriented.      ASSESSMENT/PLAN:     Assessment: Colorectal cancer screening    Plan: Colonoscopy    Anesthesia Plan: General    ASA Grade: ASA 2 - Patient with mild systemic disease with no functional limitations    MALLAMPATI SCORE:  I (soft palate, uvula, fauces, and tonsillar pillars visible)

## 2020-02-12 NOTE — ANESTHESIA PREPROCEDURE EVALUATION
02/12/2020  Gail Mckinnon is a 49 y.o., female.    Anesthesia Evaluation    I have reviewed the Patient Summary Reports.    I have reviewed the Nursing Notes.   I have reviewed the Medications.     Review of Systems  Cardiovascular:   Hypertension    Pulmonary:  Pulmonary Normal    Renal/:   renal calculi    Hepatic/GI:  Hepatic/GI Normal    Neurological:  Neurology Normal    Endocrine:   Hypothyroidism    Psych:   Psychiatric History anxiety          Physical Exam  General:  Obesity    Airway/Jaw/Neck:  Airway Findings: Mouth Opening: Normal Tongue: Normal  General Airway Assessment: Adult  Mallampati: III  Improves to II with phonation.      Dental:  Dental Findings: In tact   Chest/Lungs:  Chest/Lungs Findings: Clear to auscultation, Normal Respiratory Rate         Mental Status:  Mental Status Findings:  Cooperative, Alert and Oriented, Anxious         Anesthesia Plan  Type of Anesthesia, risks & benefits discussed:  Anesthesia Type:  general  Patient's Preference:   Intra-op Monitoring Plan: standard ASA monitors  Intra-op Monitoring Plan Comments:   Post Op Pain Control Plan:   Post Op Pain Control Plan Comments:   Induction:   IV  Beta Blocker:  Patient is not currently on a Beta-Blocker (No further documentation required).       Informed Consent: Patient understands risks and agrees with Anesthesia plan.  Questions answered. Anesthesia consent signed with patient.  ASA Score: 2     Day of Surgery Review of History & Physical:            Ready For Surgery From Anesthesia Perspective.

## 2020-02-13 ENCOUNTER — TELEPHONE (OUTPATIENT)
Dept: GASTROENTEROLOGY | Facility: CLINIC | Age: 50
End: 2020-02-13

## 2020-02-13 VITALS
BODY MASS INDEX: 42.59 KG/M2 | OXYGEN SATURATION: 98 % | SYSTOLIC BLOOD PRESSURE: 138 MMHG | TEMPERATURE: 98 F | HEIGHT: 66 IN | DIASTOLIC BLOOD PRESSURE: 84 MMHG | WEIGHT: 265 LBS | HEART RATE: 94 BPM | RESPIRATION RATE: 20 BRPM

## 2020-02-13 DIAGNOSIS — Z12.11 SCREEN FOR COLON CANCER: Primary | ICD-10-CM

## 2020-02-13 NOTE — TELEPHONE ENCOUNTER
Left message for pt to return call to clinic. Number provided.    Need to scheduled CT & CEA per Dr. Parker.

## 2020-02-14 ENCOUNTER — TELEPHONE (OUTPATIENT)
Dept: GASTROENTEROLOGY | Facility: CLINIC | Age: 50
End: 2020-02-14

## 2020-02-14 NOTE — TELEPHONE ENCOUNTER
Spoke with pt. Pt scheduled CT scan & lab. Gave instructions for CT scan. Pt verbalized understanding.

## 2020-02-16 ENCOUNTER — PATIENT MESSAGE (OUTPATIENT)
Dept: GASTROENTEROLOGY | Facility: CLINIC | Age: 50
End: 2020-02-16

## 2020-02-17 ENCOUNTER — HOSPITAL ENCOUNTER (OUTPATIENT)
Dept: RADIOLOGY | Facility: HOSPITAL | Age: 50
Discharge: HOME | End: 2020-02-17
Attending: INTERNAL MEDICINE
Payer: COMMERCIAL

## 2020-02-17 DIAGNOSIS — Z12.11 SCREEN FOR COLON CANCER: ICD-10-CM

## 2020-02-17 PROCEDURE — 74177 CT ABDOMEN PELVIS WITH CONTRAST: ICD-10-PCS | Mod: 26,,, | Performed by: RADIOLOGY

## 2020-02-17 PROCEDURE — 74177 CT ABD & PELVIS W/CONTRAST: CPT | Mod: TC,PO

## 2020-02-17 PROCEDURE — 74177 CT ABD & PELVIS W/CONTRAST: CPT | Mod: 26,,, | Performed by: RADIOLOGY

## 2020-02-17 PROCEDURE — 25500020 PHARM REV CODE 255: Mod: PO | Performed by: INTERNAL MEDICINE

## 2020-02-17 RX ADMIN — IOHEXOL 100 ML: 350 INJECTION, SOLUTION INTRAVENOUS at 11:02

## 2020-02-17 RX ADMIN — IOHEXOL 1000 ML: 12 SOLUTION ORAL at 11:02

## 2020-02-18 ENCOUNTER — PATIENT MESSAGE (OUTPATIENT)
Dept: GASTROENTEROLOGY | Facility: CLINIC | Age: 50
End: 2020-02-18

## 2020-02-18 ENCOUNTER — TELEPHONE (OUTPATIENT)
Dept: GASTROENTEROLOGY | Facility: HOSPITAL | Age: 50
End: 2020-02-18

## 2020-02-18 DIAGNOSIS — C18.7 MALIGNANT NEOPLASM OF SIGMOID COLON: Primary | ICD-10-CM

## 2020-02-19 ENCOUNTER — TELEPHONE (OUTPATIENT)
Dept: GASTROENTEROLOGY | Facility: CLINIC | Age: 50
End: 2020-02-19

## 2020-02-19 ENCOUNTER — PATIENT MESSAGE (OUTPATIENT)
Dept: GASTROENTEROLOGY | Facility: CLINIC | Age: 50
End: 2020-02-19

## 2020-02-19 NOTE — TELEPHONE ENCOUNTER
Spoke with Dr. Gill's office, they will contact pt to schedule her office visit with him & do her labs same day.

## 2020-02-19 NOTE — TELEPHONE ENCOUNTER
Spoke with pt. Informed that Dr. Parker cannot determine staging based off of his biopsy & CT scan, and that this usually occurs at time of surgery. Pt verbalized understanding. Pt asked if lab work was ordered & if it was fasting. Pt informed lab was ordered & is non-fasting. Pt stated she will have lab done tomorrow.

## 2020-02-19 NOTE — TELEPHONE ENCOUNTER
I informed pt of C-scope biopsy results (adenocarcinoma) and CT results (difficult to ascertain if CA arising from left colon or left ovary). CEA normal. Recommended referral to [facility] (Dr Gill), and will order .

## 2020-02-20 ENCOUNTER — LAB VISIT (OUTPATIENT)
Dept: LAB | Facility: HOSPITAL | Age: 50
End: 2020-02-20
Attending: INTERNAL MEDICINE
Payer: COMMERCIAL

## 2020-02-20 DIAGNOSIS — C18.7 MALIGNANT NEOPLASM OF SIGMOID COLON: ICD-10-CM

## 2020-02-20 PROCEDURE — 36415 COLL VENOUS BLD VENIPUNCTURE: CPT | Mod: PO

## 2020-02-20 PROCEDURE — 86304 IMMUNOASSAY TUMOR CA 125: CPT

## 2020-02-21 ENCOUNTER — PATIENT MESSAGE (OUTPATIENT)
Dept: GASTROENTEROLOGY | Facility: CLINIC | Age: 50
End: 2020-02-21

## 2020-02-21 DIAGNOSIS — D49.89 NEOPLASM OF PELVIS: ICD-10-CM

## 2020-02-21 LAB — CANCER AG125 SERPL-ACNC: 14 U/ML (ref 0–30)

## 2020-02-28 ENCOUNTER — HOSPITAL ENCOUNTER (OUTPATIENT)
Dept: RADIOLOGY | Facility: HOSPITAL | Age: 50
Discharge: HOME | End: 2020-02-28
Attending: SURGERY
Payer: COMMERCIAL

## 2020-02-28 DIAGNOSIS — D49.89 NEOPLASM OF PELVIS: ICD-10-CM

## 2020-02-28 PROCEDURE — 72197 MRI FEMALE PELVIS W W/O CONTRAST: ICD-10-PCS | Mod: 26,,, | Performed by: RADIOLOGY

## 2020-02-28 PROCEDURE — 72197 MRI PELVIS W/O & W/DYE: CPT | Mod: 26,,, | Performed by: RADIOLOGY

## 2020-02-28 PROCEDURE — A9585 GADOBUTROL INJECTION: HCPCS | Performed by: SURGERY

## 2020-02-28 PROCEDURE — 25500020 PHARM REV CODE 255: Performed by: SURGERY

## 2020-02-28 PROCEDURE — 72197 MRI PELVIS W/O & W/DYE: CPT | Mod: TC

## 2020-02-28 RX ORDER — GADOBUTROL 604.72 MG/ML
10 INJECTION INTRAVENOUS
Status: COMPLETED | OUTPATIENT
Start: 2020-02-28 | End: 2020-02-28

## 2020-02-28 RX ADMIN — GADOBUTROL 10 ML: 604.72 INJECTION INTRAVENOUS at 06:02

## 2020-03-03 ENCOUNTER — PATIENT OUTREACH (OUTPATIENT)
Dept: ADMINISTRATIVE | Facility: OTHER | Age: 50
End: 2020-03-03

## 2020-03-04 ENCOUNTER — PATIENT MESSAGE (OUTPATIENT)
Dept: GASTROENTEROLOGY | Facility: CLINIC | Age: 50
End: 2020-03-04

## 2020-03-04 ENCOUNTER — OFFICE VISIT (OUTPATIENT)
Dept: SURGERY | Facility: CLINIC | Age: 50
End: 2020-03-04
Payer: COMMERCIAL

## 2020-03-04 ENCOUNTER — TELEPHONE (OUTPATIENT)
Dept: SURGERY | Facility: CLINIC | Age: 50
End: 2020-03-04

## 2020-03-04 VITALS
HEART RATE: 92 BPM | HEIGHT: 66 IN | SYSTOLIC BLOOD PRESSURE: 124 MMHG | BODY MASS INDEX: 42.59 KG/M2 | DIASTOLIC BLOOD PRESSURE: 68 MMHG | WEIGHT: 265 LBS | TEMPERATURE: 98 F

## 2020-03-04 DIAGNOSIS — C18.7 MALIGNANT NEOPLASM OF SIGMOID COLON: Primary | ICD-10-CM

## 2020-03-04 PROCEDURE — 3078F DIAST BP <80 MM HG: CPT | Mod: CPTII,S$GLB,, | Performed by: SURGERY

## 2020-03-04 PROCEDURE — 3074F PR MOST RECENT SYSTOLIC BLOOD PRESSURE < 130 MM HG: ICD-10-PCS | Mod: CPTII,S$GLB,, | Performed by: SURGERY

## 2020-03-04 PROCEDURE — 3008F PR BODY MASS INDEX (BMI) DOCUMENTED: ICD-10-PCS | Mod: CPTII,S$GLB,, | Performed by: SURGERY

## 2020-03-04 PROCEDURE — 99999 PR PBB SHADOW E&M-EST. PATIENT-LVL III: ICD-10-PCS | Mod: PBBFAC,,, | Performed by: SURGERY

## 2020-03-04 PROCEDURE — 3074F SYST BP LT 130 MM HG: CPT | Mod: CPTII,S$GLB,, | Performed by: SURGERY

## 2020-03-04 PROCEDURE — 3078F PR MOST RECENT DIASTOLIC BLOOD PRESSURE < 80 MM HG: ICD-10-PCS | Mod: CPTII,S$GLB,, | Performed by: SURGERY

## 2020-03-04 PROCEDURE — 3008F BODY MASS INDEX DOCD: CPT | Mod: CPTII,S$GLB,, | Performed by: SURGERY

## 2020-03-04 PROCEDURE — 99204 PR OFFICE/OUTPT VISIT, NEW, LEVL IV, 45-59 MIN: ICD-10-PCS | Mod: S$GLB,,, | Performed by: SURGERY

## 2020-03-04 PROCEDURE — 99204 OFFICE O/P NEW MOD 45 MIN: CPT | Mod: S$GLB,,, | Performed by: SURGERY

## 2020-03-04 PROCEDURE — 99999 PR PBB SHADOW E&M-EST. PATIENT-LVL III: CPT | Mod: PBBFAC,,, | Performed by: SURGERY

## 2020-03-04 NOTE — LETTER
March 4, 2020      Shane Parker MD  [address] [facility] [address] [place] [address]           [facility]  [address] [place] [address]  Phone: [phone]          Patient: Gail Mckinnon   MR Number: [id number]   YOB: 1970   Date of Visit: 3/4/2020       Dear Dr. Shane Parker:    Thank you for referring Gail Mckinnon to me for evaluation. Attached you will find relevant portions of my assessment and plan of care.    If you have questions, please do not hesitate to call me. I look forward to following Gail Mckinnon along with you.    Sincerely,    Helio Stern LPN    Enclosure  CC:  No Recipients    If you would like to receive this communication electronically, please contact [email or web address] or [phone] to request more information on [employer] Link access.    For providers and/or their staff who would like to refer a patient to [facility], please contact us through our one-stop-shop provider referral line, Juan C Vital, at [phone].    If you feel you have received this communication in error or would no longer like to receive these types of communications, please e-mail [email or web address]

## 2020-03-04 NOTE — PROGRESS NOTES
Subjective:       Patient ID: Gail Mckinnon is a 49 y.o. female.    Chief Complaint: Abdominal Pain (LLQ pain)    HPI this is a pleasant 49-year-old female referred to me in consultation from  for evaluation of colon cancer.  Patient notes that she had somewhat diffuse abdominal pain which began initially in 2019.  She does have a history of nephrolithiasis and thought that this was related to her renal stones.  She had a CT scan at that time demonstrating nephrolithiasis as well as some inflammation of the left sigmoid colon.  She was given antibiotics and recommended to have a colonoscopy after resolution.  Patient did not have a colonoscopy until February of this year.  At colonoscopy patient was noted to have a protruding lesion within the sigmoid colon.  There appeared to be 2 separate lesions which were  by area of apparent ischemic bowel. These were biopsied and demonstrated adenocarcinoma.  Patient also had a CT scan demonstrating suspected colon mass with potential involvement of the left ovary.  Patient has also had an MRI demonstrating colon mass with abutment into the left ovary and possible into the uterus.  She notes that she is having normal bowel movements.  She denies blood per rectum.  She denies any nausea vomiting.  She has had no changes in her appetite.  Past surgical history significant for  x3.  Her past medical history significant for hypertension as well as goiter.  Review of Systems   Constitutional: Negative for activity change, appetite change, fever and unexpected weight change.   Respiratory: Negative for chest tightness, shortness of breath and wheezing.    Cardiovascular: Negative for chest pain.   Gastrointestinal: Positive for abdominal pain. Negative for abdominal distention, anal bleeding, blood in stool, constipation, diarrhea, nausea, rectal pain and vomiting.   Genitourinary: Negative for difficulty urinating, dysuria and frequency.    Skin: Negative for color change and wound.   Neurological: Negative for dizziness.   Hematological: Negative for adenopathy. Does not bruise/bleed easily.   Psychiatric/Behavioral: Negative for agitation and decreased concentration.       Objective:      Physical Exam   Constitutional: She is oriented to person, place, and time. She appears well-developed and well-nourished.   HENT:   Head: Normocephalic and atraumatic.   Eyes: Pupils are equal, round, and reactive to light.   Neck: Normal range of motion. Neck supple. No tracheal deviation present. No thyromegaly present.   Cardiovascular: Normal rate, regular rhythm and normal heart sounds.   No murmur heard.  Pulmonary/Chest: Effort normal and breath sounds normal. She exhibits no tenderness.   Abdominal: Soft. Bowel sounds are normal. She exhibits no distension, no abdominal bruit, no pulsatile midline mass and no mass. There is no hepatosplenomegaly. There is no tenderness. There is no rigidity, no rebound, no guarding, no tenderness at McBurney's point and negative Govea's sign. No hernia. Hernia confirmed negative in the ventral area.   Genitourinary: Rectum normal.   Musculoskeletal: Normal range of motion.   Neurological: She is alert and oriented to person, place, and time.   Skin: Skin is warm. No rash noted. No erythema.   Psychiatric: She has a normal mood and affect.   Vitals reviewed.        Cscope:                  Lab Results   Component Value Date    WBC 10.69 06/23/2019    HGB 13.1 06/23/2019    HCT 41.4 06/23/2019    MCV 85 06/23/2019     (H) 06/23/2019       Lab Results   Component Value Date    CEA 1.3 02/17/2020     Lab Results   Component Value Date     14 02/20/2020     CT Scan: Mass of colon and suggestion of involvement of the L uterus and ovary     MRI: Colon mass invading L ovary.     Path:  Colon, sigmoid mass (biopsy):   - Adenocarcinoma associated with ulcer   - Maximum depth of invasion cannot be determined on the  biopsy material   - Pending MSI studies will be issued in a supplemental report   Assessment:       1. Malignant neoplasm of sigmoid colon        Plan:       The discussion with the patient and her mother.  I have reviewed with her the pathology of colon cancer.  I have also discussed with her and reviewed with her findings on imaging as well as from the colonoscopy itself.  Discussed with her that she has an apparent locally invasive carcinoma.  There are no signs of distant metastatic disease at present. I have discussed with her that she will need resection of the mass with EN bloc protocol.  Discussed with her this will likely require a left oophorectomy as well as a hysterectomy.  I have offered to perform surgery in the [place] area. Patient notes that she strongly desires to have surgery performed at the main campus.  Will place referral to colorectal the [facility].  I have offered to be of assistance in any way possible in the treatment of her care.

## 2020-03-05 ENCOUNTER — TELEPHONE (OUTPATIENT)
Dept: FAMILY MEDICINE | Facility: CLINIC | Age: 50
End: 2020-03-05

## 2020-03-05 ENCOUNTER — PATIENT MESSAGE (OUTPATIENT)
Dept: GASTROENTEROLOGY | Facility: CLINIC | Age: 50
End: 2020-03-05

## 2020-03-05 DIAGNOSIS — G89.29 CHRONIC ABDOMINAL PAIN: Primary | ICD-10-CM

## 2020-03-05 DIAGNOSIS — R10.9 CHRONIC ABDOMINAL PAIN: Primary | ICD-10-CM

## 2020-03-05 RX ORDER — TRAMADOL HYDROCHLORIDE 50 MG/1
50 TABLET ORAL 3 TIMES DAILY PRN
Qty: 90 TABLET | Refills: 2 | Status: SHIPPED | OUTPATIENT
Start: 2020-03-05 | End: 2020-03-05 | Stop reason: SDUPTHER

## 2020-03-05 RX ORDER — TRAMADOL HYDROCHLORIDE 50 MG/1
50 TABLET ORAL 3 TIMES DAILY PRN
Qty: 90 TABLET | Refills: 2 | Status: SHIPPED | OUTPATIENT
Start: 2020-03-05 | End: 2021-12-14 | Stop reason: SDUPTHER

## 2020-03-05 NOTE — TELEPHONE ENCOUNTER
"Received fax from [facility] pharmacy stating "Need diagnosis code for chronic pain condition being treated". For Medication TRAMADOL 50MG TAB.  Please advise.   "

## 2020-03-05 NOTE — TELEPHONE ENCOUNTER
Patient is experiencing pain in location of cancer.   Does not see oncology until next week.   Requesting something stronger for pain than IBP.

## 2020-03-05 NOTE — TELEPHONE ENCOUNTER
Spoke to [facility] pharmacy advised new rx was sent. Pharmacy advised they received it. Verbalized understanding. Phone call ended.

## 2020-03-06 ENCOUNTER — HOSPITAL ENCOUNTER (OUTPATIENT)
Dept: CARDIOLOGY | Facility: CLINIC | Age: 50
Discharge: HOME | End: 2020-03-06
Payer: COMMERCIAL

## 2020-03-06 ENCOUNTER — TELEPHONE (OUTPATIENT)
Dept: SURGERY | Facility: CLINIC | Age: 50
End: 2020-03-06

## 2020-03-06 ENCOUNTER — APPOINTMENT (OUTPATIENT)
Dept: LAB | Facility: HOSPITAL | Age: 50
End: 2020-03-06
Attending: COLON & RECTAL SURGERY
Payer: COMMERCIAL

## 2020-03-06 ENCOUNTER — TELEPHONE (OUTPATIENT)
Dept: PREADMISSION TESTING | Facility: HOSPITAL | Age: 50
End: 2020-03-06

## 2020-03-06 ENCOUNTER — OFFICE VISIT (OUTPATIENT)
Dept: SURGERY | Facility: CLINIC | Age: 50
End: 2020-03-06
Payer: COMMERCIAL

## 2020-03-06 VITALS
SYSTOLIC BLOOD PRESSURE: 146 MMHG | DIASTOLIC BLOOD PRESSURE: 78 MMHG | HEART RATE: 100 BPM | WEIGHT: 267.88 LBS | BODY MASS INDEX: 43.05 KG/M2 | HEIGHT: 66 IN

## 2020-03-06 DIAGNOSIS — D49.0 COLORECTAL NEOPLASM: ICD-10-CM

## 2020-03-06 DIAGNOSIS — C18.7 MALIGNANT NEOPLASM OF SIGMOID COLON: ICD-10-CM

## 2020-03-06 DIAGNOSIS — C18.7 MALIGNANT NEOPLASM OF SIGMOID COLON: Primary | ICD-10-CM

## 2020-03-06 PROCEDURE — 3008F PR BODY MASS INDEX (BMI) DOCUMENTED: ICD-10-PCS | Mod: CPTII,S$GLB,, | Performed by: COLON & RECTAL SURGERY

## 2020-03-06 PROCEDURE — 99999 PR PBB SHADOW E&M-EST. PATIENT-LVL III: ICD-10-PCS | Mod: PBBFAC,,, | Performed by: COLON & RECTAL SURGERY

## 2020-03-06 PROCEDURE — 99205 PR OFFICE/OUTPT VISIT, NEW, LEVL V, 60-74 MIN: ICD-10-PCS | Mod: S$GLB,,, | Performed by: COLON & RECTAL SURGERY

## 2020-03-06 PROCEDURE — 93005 EKG 12-LEAD: ICD-10-PCS | Mod: S$GLB,,, | Performed by: COLON & RECTAL SURGERY

## 2020-03-06 PROCEDURE — 99999 PR PBB SHADOW E&M-EST. PATIENT-LVL III: CPT | Mod: PBBFAC,,, | Performed by: COLON & RECTAL SURGERY

## 2020-03-06 PROCEDURE — 93005 ELECTROCARDIOGRAM TRACING: CPT | Mod: S$GLB,,, | Performed by: COLON & RECTAL SURGERY

## 2020-03-06 PROCEDURE — 3077F PR MOST RECENT SYSTOLIC BLOOD PRESSURE >= 140 MM HG: ICD-10-PCS | Mod: CPTII,S$GLB,, | Performed by: COLON & RECTAL SURGERY

## 2020-03-06 PROCEDURE — 3077F SYST BP >= 140 MM HG: CPT | Mod: CPTII,S$GLB,, | Performed by: COLON & RECTAL SURGERY

## 2020-03-06 PROCEDURE — 93010 EKG 12-LEAD: ICD-10-PCS | Mod: S$GLB,,, | Performed by: INTERNAL MEDICINE

## 2020-03-06 PROCEDURE — 99205 OFFICE O/P NEW HI 60 MIN: CPT | Mod: S$GLB,,, | Performed by: COLON & RECTAL SURGERY

## 2020-03-06 PROCEDURE — 3078F DIAST BP <80 MM HG: CPT | Mod: CPTII,S$GLB,, | Performed by: COLON & RECTAL SURGERY

## 2020-03-06 PROCEDURE — 3078F PR MOST RECENT DIASTOLIC BLOOD PRESSURE < 80 MM HG: ICD-10-PCS | Mod: CPTII,S$GLB,, | Performed by: COLON & RECTAL SURGERY

## 2020-03-06 PROCEDURE — 93010 ELECTROCARDIOGRAM REPORT: CPT | Mod: S$GLB,,, | Performed by: INTERNAL MEDICINE

## 2020-03-06 PROCEDURE — 3008F BODY MASS INDEX DOCD: CPT | Mod: CPTII,S$GLB,, | Performed by: COLON & RECTAL SURGERY

## 2020-03-06 RX ORDER — METRONIDAZOLE 500 MG/1
TABLET ORAL
Qty: 3 TABLET | Refills: 0 | Status: ON HOLD | OUTPATIENT
Start: 2020-03-06 | End: 2020-03-25

## 2020-03-06 RX ORDER — NEOMYCIN SULFATE 500 MG/1
TABLET ORAL
Qty: 6 TABLET | Refills: 0 | Status: ON HOLD | OUTPATIENT
Start: 2020-03-06 | End: 2020-03-25

## 2020-03-06 NOTE — PROGRESS NOTES
[facility] Office Visit History and Physical    Referring Md:   Freddie Gill Md  [address] [facility] [address]  [place] [address]    SUBJECTIVE:     Chief Complaint: sigmoid colon cancer    History of Present Illness:  The patient is new patient to this practice.   Course is as follows:  Patient is a 49 y.o. female presents with sigmoid colon cancer.   2019:  Had CT scan for renal issues and found to have a sigmoid colon mass.  Lost to follow-up.  20:  Colonoscopy done.  Sigmoid mass found.   Pathology:  - Adenocarcinoma associated with ulcer, IHC for MMR intact.   Staging:  CT abd/pelvis: Worrisome interval detrimental change with interval increase in irregular nodular concentric mural thickening involving the sigmoid colon over an approximately 10 cm length with adjacent regional mesenteric lymphadenopathy observed..  There are 2 well-circumscribed hypodensities in proximity to the patient's colonic mass, possibly originating from the left ovary.  The colonic mass is intimately associated with the left aspect of the uterine fundus.   MRI pelvis: masslike thickening of wall of the sigmoid colon with surrounding adenopathy, left adnexal/ovarian cysts and small amount of fluid within the left side of the pelvis/lower left pericolic gutter.  CEA = 1.3    Functionally, she has lost 30 lb over the past several months.  6 lb over the past month.  She is active.  Nonsmoker.  1-2 bowel movements per day.  No issues with fecal incontinence.  Past abdominal surgeries include  x3 with 2 through a Pfannenstiel on 1 through a lower midline.  Uncle with colon cancer.  No 1st degree relatives with colon cancer.  She has intermittent left lower quadrant abdominal pain.  Intermittently radiates to the back.    Last Colonoscopy: 20:   polypoid and ulcerated (ischemic-appearing) partially obstructing large mass was found in the mid sigmoid  colon, approx 10 cm in length    Review of patient's allergies indicates:   Allergen  "Reactions    Penicillin g      unknown    Penicillins      Other reaction(s): Unknown       Past Medical History:   Diagnosis Date    Anxiety     Depression     Hypothyroid     Kidney calculi     Menorrhagia     Multinodular goiter 2012    Palpitation     Venous insufficiency      Past Surgical History:   Procedure Laterality Date     SECTION, CLASSIC      COLONOSCOPY N/A 2020    Procedure: COLONOSCOPY;  Surgeon: Shane Parker MD;  Location: [facility];  Service: Endoscopy;  Laterality: N/A;    tonsillectomy       Family History   Problem Relation Age of Onset    Diabetes Mother 65    Cancer Maternal Aunt         lung cancer    Cancer Maternal Grandmother         stomach cancer     Social History     Tobacco Use    Smoking status: Never Smoker    Smokeless tobacco: Never Used   Substance Use Topics    Alcohol use: Yes     Comment: occasionally    Drug use: Never        Review of Systems:  Review of Systems   Constitutional: Positive for malaise/fatigue and weight loss. Negative for chills, diaphoresis and fever.   HENT: Negative for congestion.    Respiratory: Negative for shortness of breath.    Cardiovascular: Negative for chest pain and leg swelling.   Gastrointestinal: Positive for abdominal pain. Negative for blood in stool, constipation, nausea and vomiting.   Genitourinary: Negative for dysuria.   Musculoskeletal: Negative for back pain and myalgias.   Skin: Negative for rash.   Neurological: Negative for dizziness and weakness.   Endo/Heme/Allergies: Does not bruise/bleed easily.   Psychiatric/Behavioral: Negative for depression.       OBJECTIVE:     Vital Signs (Most Recent)  BP (!) 146/78 (BP Location: Left arm, Patient Position: Sitting, BP Method: Large (Automatic))   Pulse 100   Ht 5' 6" (1.676 m)   Wt 121.5 kg (267 lb 13.7 oz)   LMP 2020   BMI 43.23 kg/m²     Physical Exam:  General: White female in no distress   Neuro: alert and oriented x 4.  " Moves all extremities.     HEENT: no icterus.  Trachea midline  Respiratory: respirations are even and unlabored  Cardiac: regular rate  Abdomen:  Obese, lower midline incision well healed without evidence of hernia.  Mildly tender to deep palpation in the left lower quadrant.  No masses palpated.  Extremities: Warm dry and intact  Skin: no rashes  Anorectal:  Deferred    Labs:  H&H 11 and 38.  Albumin 3.1.  Normal renal function.    Imaging:  CT of the abdomen and pelvis personally reviewed demonstrates a long segment sigmoid mass abutting the uterus and ovary as well as some inflammation extending towards the ureter.      ASSESSMENT/PLAN:     Gail Dias was seen today for malignant neoplasm of sigmoid colon.    Diagnoses and all orders for this visit:    Malignant neoplasm of sigmoid colon  -     Ambulatory referral/consult to Colorectal Surgery  -     CT Chest With Contrast; Future  -     Comprehensive metabolic panel; Future  -     CBC auto differential; Future  -     EKG 12-lead; Future    Colorectal neoplasm  -     CT Chest With Contrast; Future  -     Comprehensive metabolic panel; Future  -     CBC auto differential; Future  -     EKG 12-lead; Future         49-year-old woman with sigmoid colon cancer with apparent local involvement of the uterus and ovary.  I had a long discussion regarding the staging of colon rectal cancer as well as the treatment options.  Discussed that she will likely need surgery as well as chemotherapy.  Would plan for surgery up front in order to remove the mass.  She would likely also require hysterectomy and oophorectomy.  I reached out to Dr. Brady and Dr. Akbar regarding the possibility of a coordinated resection.  Will tentatively plan for March 25th.  She will also need left-sided ureteral stent.  Consents were signed today.  We discussed the risks of anastomotic leak, hernia, need for additional treatment, need for long-term surveillance, bleeding, damage to the  ureter.  Once I hear back from GYN Oncology, case request will be placed.    THOMAS Man MD, FACS  Staff Surgeon  Colon & Rectal Surgery

## 2020-03-06 NOTE — TELEPHONE ENCOUNTER
Called to see if patient would like to come in to see Dr Man this afternoon, no answer, Left message to call back

## 2020-03-06 NOTE — H&P (VIEW-ONLY)
CRS Office Visit History and Physical    Referring Md:   Freddie Gill Md  [address] [facility] [address]  [place] [address]    SUBJECTIVE:     Chief Complaint: sigmoid colon cancer    History of Present Illness:  The patient is new patient to this practice.   Course is as follows:  Patient is a 49 y.o. female presents with sigmoid colon cancer.   2019:  Had CT scan for renal issues and found to have a sigmoid colon mass.  Lost to follow-up.  20:  Colonoscopy done.  Sigmoid mass found.   Pathology:  - Adenocarcinoma associated with ulcer, IHC for MMR intact.   Staging:  CT abd/pelvis: Worrisome interval detrimental change with interval increase in irregular nodular concentric mural thickening involving the sigmoid colon over an approximately 10 cm length with adjacent regional mesenteric lymphadenopathy observed..  There are 2 well-circumscribed hypodensities in proximity to the patient's colonic mass, possibly originating from the left ovary.  The colonic mass is intimately associated with the left aspect of the uterine fundus.   MRI pelvis: masslike thickening of wall of the sigmoid colon with surrounding adenopathy, left adnexal/ovarian cysts and small amount of fluid within the left side of the pelvis/lower left pericolic gutter.  CEA = 1.3    Functionally, she has lost 30 lb over the past several months.  6 lb over the past month.  She is active.  Nonsmoker.  1-2 bowel movements per day.  No issues with fecal incontinence.  Past abdominal surgeries include  x3 with 2 through a Pfannenstiel on 1 through a lower midline.  Uncle with colon cancer.  No 1st degree relatives with colon cancer.  She has intermittent left lower quadrant abdominal pain.  Intermittently radiates to the back.    Last Colonoscopy: 20:   polypoid and ulcerated (ischemic-appearing) partially obstructing large mass was found in the mid sigmoid  colon, approx 10 cm in length    Review of patient's allergies indicates:   Allergen  "Reactions    Penicillin g      unknown    Penicillins      Other reaction(s): Unknown       Past Medical History:   Diagnosis Date    Anxiety     Depression     Hypothyroid     Kidney calculi     Menorrhagia     Multinodular goiter 2012    Palpitation     Venous insufficiency      Past Surgical History:   Procedure Laterality Date     SECTION, CLASSIC      COLONOSCOPY N/A 2020    Procedure: COLONOSCOPY;  Surgeon: Shane Parker MD;  Location: [facility];  Service: Endoscopy;  Laterality: N/A;    tonsillectomy       Family History   Problem Relation Age of Onset    Diabetes Mother 65    Cancer Maternal Aunt         lung cancer    Cancer Maternal Grandmother         stomach cancer     Social History     Tobacco Use    Smoking status: Never Smoker    Smokeless tobacco: Never Used   Substance Use Topics    Alcohol use: Yes     Comment: occasionally    Drug use: Never        Review of Systems:  Review of Systems   Constitutional: Positive for malaise/fatigue and weight loss. Negative for chills, diaphoresis and fever.   HENT: Negative for congestion.    Respiratory: Negative for shortness of breath.    Cardiovascular: Negative for chest pain and leg swelling.   Gastrointestinal: Positive for abdominal pain. Negative for blood in stool, constipation, nausea and vomiting.   Genitourinary: Negative for dysuria.   Musculoskeletal: Negative for back pain and myalgias.   Skin: Negative for rash.   Neurological: Negative for dizziness and weakness.   Endo/Heme/Allergies: Does not bruise/bleed easily.   Psychiatric/Behavioral: Negative for depression.       OBJECTIVE:     Vital Signs (Most Recent)  BP (!) 146/78 (BP Location: Left arm, Patient Position: Sitting, BP Method: Large (Automatic))   Pulse 100   Ht 5' 6" (1.676 m)   Wt 121.5 kg (267 lb 13.7 oz)   LMP 2020   BMI 43.23 kg/m²     Physical Exam:  General: White female in no distress   Neuro: alert and oriented x 4.  " Moves all extremities.     HEENT: no icterus.  Trachea midline  Respiratory: respirations are even and unlabored  Cardiac: regular rate  Abdomen:  Obese, lower midline incision well healed without evidence of hernia.  Mildly tender to deep palpation in the left lower quadrant.  No masses palpated.  Extremities: Warm dry and intact  Skin: no rashes  Anorectal:  Deferred    Labs:  H&H 11 and 38.  Albumin 3.1.  Normal renal function.    Imaging:  CT of the abdomen and pelvis personally reviewed demonstrates a long segment sigmoid mass abutting the uterus and ovary as well as some inflammation extending towards the ureter.      ASSESSMENT/PLAN:     Gail Dias was seen today for malignant neoplasm of sigmoid colon.    Diagnoses and all orders for this visit:    Malignant neoplasm of sigmoid colon  -     Ambulatory referral/consult to Colorectal Surgery  -     CT Chest With Contrast; Future  -     Comprehensive metabolic panel; Future  -     CBC auto differential; Future  -     EKG 12-lead; Future    Colorectal neoplasm  -     CT Chest With Contrast; Future  -     Comprehensive metabolic panel; Future  -     CBC auto differential; Future  -     EKG 12-lead; Future         49-year-old woman with sigmoid colon cancer with apparent local involvement of the uterus and ovary.  I had a long discussion regarding the staging of colon rectal cancer as well as the treatment options.  Discussed that she will likely need surgery as well as chemotherapy.  Would plan for surgery up front in order to remove the mass.  She would likely also require hysterectomy and oophorectomy.  I reached out to Dr. Brady and Dr. Akbar regarding the possibility of a coordinated resection.  Will tentatively plan for March 25th.  She will also need left-sided ureteral stent.  Consents were signed today.  We discussed the risks of anastomotic leak, hernia, need for additional treatment, need for long-term surveillance, bleeding, damage to the  ureter.  Once I hear back from GYN Oncology, case request will be placed.    THOMAS Man MD, FACS  Staff Surgeon  Colon & Rectal Surgery

## 2020-03-06 NOTE — LETTER
March 6, 2020      Freddie Gill MD  [address] [facility] [address] [place] [address]           [facility]  [address]  [facility] [address]  Phone: [phone]          Patient: Gail Mckinnon   MR Number: [id number]   YOB: 1970   Date of Visit: 3/6/2020       Dear Dr. Freddie Gill:    Thank you for referring Gail Mckinnon to me for evaluation. Attached you will find relevant portions of my assessment and plan of care.    If you have questions, please do not hesitate to call me. I look forward to following Gail Mckinnon along with you.    Sincerely,    Silvio Man MD    Enclosure  CC:  No Recipients    If you would like to receive this communication electronically, please contact [email or web address] or [phone] to request more information on inDplay Link access.    For providers and/or their staff who would like to refer a patient to [facility], please contact us through our one-stop-shop provider referral line, [facility], at [phone].    If you feel you have received this communication in error or would no longer like to receive these types of communications, please e-mail [email or web address]

## 2020-03-06 NOTE — TELEPHONE ENCOUNTER
----- Message from Nela Diaz RN sent at 3/6/2020  3:19 PM CST -----  Pt is scheduled for surgery on 3/25 with Dr Man, colorectal surgery.  He needs her to be seen in the preop clinic.  Can you please arrange?    Nela

## 2020-03-08 ENCOUNTER — PATIENT MESSAGE (OUTPATIENT)
Dept: SURGERY | Facility: CLINIC | Age: 50
End: 2020-03-08

## 2020-03-08 DIAGNOSIS — C18.7 MALIGNANT NEOPLASM OF SIGMOID COLON: Primary | ICD-10-CM

## 2020-03-09 DIAGNOSIS — C18.7 MALIGNANT NEOPLASM OF SIGMOID COLON: Primary | ICD-10-CM

## 2020-03-10 ENCOUNTER — HOSPITAL ENCOUNTER (OUTPATIENT)
Dept: RADIOLOGY | Facility: HOSPITAL | Age: 50
Discharge: HOME | End: 2020-03-10
Attending: COLON & RECTAL SURGERY
Payer: COMMERCIAL

## 2020-03-10 DIAGNOSIS — D49.0 COLORECTAL NEOPLASM: ICD-10-CM

## 2020-03-10 DIAGNOSIS — C18.7 MALIGNANT NEOPLASM OF SIGMOID COLON: ICD-10-CM

## 2020-03-10 PROCEDURE — 71260 CT THORAX DX C+: CPT | Mod: TC

## 2020-03-10 PROCEDURE — 71260 CT THORAX DX C+: CPT | Mod: 26,,, | Performed by: RADIOLOGY

## 2020-03-10 PROCEDURE — 25500020 PHARM REV CODE 255

## 2020-03-10 PROCEDURE — 71260 CT CHEST WITH CONTRAST: ICD-10-PCS | Mod: 26,,, | Performed by: RADIOLOGY

## 2020-03-10 RX ORDER — SODIUM CHLORIDE 9 MG/ML
INJECTION, SOLUTION INTRAVENOUS
Status: CANCELLED | OUTPATIENT
Start: 2020-03-10

## 2020-03-10 RX ORDER — LIDOCAINE HYDROCHLORIDE 10 MG/ML
1 INJECTION, SOLUTION EPIDURAL; INFILTRATION; INTRACAUDAL; PERINEURAL
Status: CANCELLED | OUTPATIENT
Start: 2020-03-10

## 2020-03-10 RX ORDER — HEPARIN SODIUM 5000 [USP'U]/ML
5000 INJECTION, SOLUTION INTRAVENOUS; SUBCUTANEOUS EVERY 8 HOURS
Status: CANCELLED | OUTPATIENT
Start: 2020-03-10 | End: 2020-03-11

## 2020-03-10 RX ORDER — METRONIDAZOLE 500 MG/100ML
500 INJECTION, SOLUTION INTRAVENOUS
Status: CANCELLED | OUTPATIENT
Start: 2020-03-10

## 2020-03-10 RX ORDER — GABAPENTIN 100 MG/1
300 CAPSULE ORAL 3 TIMES DAILY
Status: CANCELLED | OUTPATIENT
Start: 2020-03-10

## 2020-03-10 RX ORDER — ALVIMOPAN 12 MG/1
12 CAPSULE ORAL
Status: CANCELLED | OUTPATIENT
Start: 2020-03-25 | End: 2020-03-29

## 2020-03-10 RX ORDER — GABAPENTIN 100 MG/1
300 CAPSULE ORAL
Status: CANCELLED | OUTPATIENT
Start: 2020-03-10

## 2020-03-10 RX ORDER — ACETAMINOPHEN 650 MG/20.3ML
975 LIQUID ORAL
Status: CANCELLED | OUTPATIENT
Start: 2020-03-10

## 2020-03-10 RX ORDER — TRIPROLIDINE/PSEUDOEPHEDRINE 2.5MG-60MG
600 TABLET ORAL
Status: CANCELLED | OUTPATIENT
Start: 2020-03-10

## 2020-03-10 RX ORDER — SODIUM CHLORIDE 9 MG/ML
INJECTION, SOLUTION INTRAVENOUS CONTINUOUS
Status: CANCELLED | OUTPATIENT
Start: 2020-03-10

## 2020-03-10 RX ORDER — MUPIROCIN 20 MG/G
1 OINTMENT TOPICAL
Status: CANCELLED | OUTPATIENT
Start: 2020-03-10

## 2020-03-10 RX ADMIN — IOHEXOL 75 ML: 350 INJECTION, SOLUTION INTRAVENOUS at 10:03

## 2020-03-11 ENCOUNTER — TELEPHONE (OUTPATIENT)
Dept: SURGERY | Facility: CLINIC | Age: 50
End: 2020-03-11

## 2020-03-13 ENCOUNTER — TELEPHONE (OUTPATIENT)
Dept: ADMINISTRATIVE | Facility: OTHER | Age: 50
End: 2020-03-13

## 2020-03-16 ENCOUNTER — TELEPHONE (OUTPATIENT)
Dept: GYNECOLOGIC ONCOLOGY | Facility: CLINIC | Age: 50
End: 2020-03-16

## 2020-03-16 ENCOUNTER — LAB VISIT (OUTPATIENT)
Dept: LAB | Facility: HOSPITAL | Age: 50
End: 2020-03-16
Attending: OBSTETRICS & GYNECOLOGY
Payer: COMMERCIAL

## 2020-03-16 ENCOUNTER — INITIAL CONSULT (OUTPATIENT)
Dept: GYNECOLOGIC ONCOLOGY | Facility: CLINIC | Age: 50
End: 2020-03-16
Payer: COMMERCIAL

## 2020-03-16 ENCOUNTER — PATIENT MESSAGE (OUTPATIENT)
Dept: SURGERY | Facility: HOSPITAL | Age: 50
End: 2020-03-16

## 2020-03-16 VITALS
WEIGHT: 265.88 LBS | DIASTOLIC BLOOD PRESSURE: 63 MMHG | HEART RATE: 92 BPM | HEIGHT: 66 IN | SYSTOLIC BLOOD PRESSURE: 108 MMHG | BODY MASS INDEX: 42.73 KG/M2

## 2020-03-16 DIAGNOSIS — R39.9 UTI SYMPTOMS: Primary | ICD-10-CM

## 2020-03-16 DIAGNOSIS — R39.9 UTI SYMPTOMS: ICD-10-CM

## 2020-03-16 DIAGNOSIS — C18.7 MALIGNANT NEOPLASM OF SIGMOID COLON: ICD-10-CM

## 2020-03-16 DIAGNOSIS — Z80.41 FH: OVARIAN CANCER: ICD-10-CM

## 2020-03-16 DIAGNOSIS — C18.7 MALIGNANT NEOPLASM OF SIGMOID COLON: Primary | ICD-10-CM

## 2020-03-16 LAB
BILIRUB SERPL-MCNC: NORMAL MG/DL
BLOOD URINE, POC: NORMAL
COLOR, POC UA: YELLOW
GLUCOSE UR QL STRIP: NORMAL
KETONES UR QL STRIP: NORMAL
LEUKOCYTE ESTERASE URINE, POC: NORMAL
NITRITE, POC UA: NORMAL
PH, POC UA: 5
PROTEIN, POC: NORMAL
SPECIFIC GRAVITY, POC UA: 1.02
UROBILINOGEN, POC UA: NORMAL

## 2020-03-16 PROCEDURE — 3008F BODY MASS INDEX DOCD: CPT | Mod: CPTII,S$GLB,, | Performed by: OBSTETRICS & GYNECOLOGY

## 2020-03-16 PROCEDURE — 3008F PR BODY MASS INDEX (BMI) DOCUMENTED: ICD-10-PCS | Mod: CPTII,S$GLB,, | Performed by: OBSTETRICS & GYNECOLOGY

## 2020-03-16 PROCEDURE — 81002 POCT URINE DIPSTICK WITHOUT MICROSCOPE: ICD-10-PCS | Mod: S$GLB,,, | Performed by: OBSTETRICS & GYNECOLOGY

## 2020-03-16 PROCEDURE — 81002 URINALYSIS NONAUTO W/O SCOPE: CPT | Mod: S$GLB,,, | Performed by: OBSTETRICS & GYNECOLOGY

## 2020-03-16 PROCEDURE — 99205 PR OFFICE/OUTPT VISIT, NEW, LEVL V, 60-74 MIN: ICD-10-PCS | Mod: 25,S$GLB,, | Performed by: OBSTETRICS & GYNECOLOGY

## 2020-03-16 PROCEDURE — 3078F DIAST BP <80 MM HG: CPT | Mod: CPTII,S$GLB,, | Performed by: OBSTETRICS & GYNECOLOGY

## 2020-03-16 PROCEDURE — 36415 COLL VENOUS BLD VENIPUNCTURE: CPT

## 2020-03-16 PROCEDURE — 99205 OFFICE O/P NEW HI 60 MIN: CPT | Mod: 25,S$GLB,, | Performed by: OBSTETRICS & GYNECOLOGY

## 2020-03-16 PROCEDURE — 3074F SYST BP LT 130 MM HG: CPT | Mod: CPTII,S$GLB,, | Performed by: OBSTETRICS & GYNECOLOGY

## 2020-03-16 PROCEDURE — 3074F PR MOST RECENT SYSTOLIC BLOOD PRESSURE < 130 MM HG: ICD-10-PCS | Mod: CPTII,S$GLB,, | Performed by: OBSTETRICS & GYNECOLOGY

## 2020-03-16 PROCEDURE — 3078F PR MOST RECENT DIASTOLIC BLOOD PRESSURE < 80 MM HG: ICD-10-PCS | Mod: CPTII,S$GLB,, | Performed by: OBSTETRICS & GYNECOLOGY

## 2020-03-16 PROCEDURE — 99999 PR PBB SHADOW E&M-EST. PATIENT-LVL III: ICD-10-PCS | Mod: PBBFAC,,, | Performed by: OBSTETRICS & GYNECOLOGY

## 2020-03-16 PROCEDURE — 99999 PR PBB SHADOW E&M-EST. PATIENT-LVL III: CPT | Mod: PBBFAC,,, | Performed by: OBSTETRICS & GYNECOLOGY

## 2020-03-16 RX ORDER — HYDROCODONE BITARTRATE AND ACETAMINOPHEN 5; 325 MG/1; MG/1
1 TABLET ORAL EVERY 6 HOURS PRN
Qty: 40 TABLET | Refills: 0 | Status: ON HOLD | OUTPATIENT
Start: 2020-03-16 | End: 2020-03-28 | Stop reason: HOSPADM

## 2020-03-16 NOTE — PROGRESS NOTES
"Subjective:       Patient ID: Gail Mckinnon is a 49 y.o. female.    Chief Complaint: malignant neoplasm of sigmoid colon    HPI   Referring physician: Silvio Man  Reason for referral: colon cancer with possible uterine/adnexal involvement.     June 2019: CTdone for renal stone. This showed a thickened area of colon and an enlarged LN.     2/12/2020: delayed colonoscopy until she noted blood in her stool. 10 cm ulcerated mass at mid sigmoid colon. Biopsy: Adenocarcinoma associated with ulcer.     MRI: "long segment of sigmoid colon with marked irregular circumferential wall thickening and with surrounding pericolonic adenopathy.  Single wall thickness is approximately 2 cm and the diameter of the segment of colon 6.3 cm.  Enlarged nodes measure up to 1.5 cm.  The involved segment measures approximately 12 cm in length.    The segment of colon abuts and is indistinguishable from the left side of the uterus and left adnexal structures.  There are well-defined 4.4 and 1.9 cm left adnexal cyst most likely ovarian along the anterior aspect of this mass".    CT of chest: Small hiatal hernia.  Trace pericardial effusion.  An intrapulmonary mass is not seen on today's study.  Fatty infiltration of the liver parenchyma.  cholelithiasis    LMP: 12/2019. Spotted fEB 17, 2020.     Review of Systems   Constitutional: Negative for chills, fatigue and fever.   Respiratory: Negative for cough, shortness of breath and wheezing.    Cardiovascular: Negative for chest pain, palpitations and leg swelling.   Gastrointestinal: Positive for abdominal pain (left sided to navel and back. ). Negative for constipation, diarrhea, nausea and vomiting.   Genitourinary: Positive for difficulty urinating (notes decrease in volume of urination. ). Negative for dysuria, frequency, genital sores, hematuria, urgency, vaginal bleeding, vaginal discharge and vaginal pain.   Musculoskeletal: Positive for back pain.   Neurological: Positive " "for dizziness. Negative for weakness.   Hematological: Negative for adenopathy. Does not bruise/bleed easily.   Psychiatric/Behavioral: The patient is not nervous/anxious.        Objective:   /63   Pulse 92   Ht 5' 6" (1.676 m)   Wt 120.6 kg (265 lb 14 oz)   LMP 02/17/2020   BMI 42.91 kg/m²      Physical Exam   Constitutional: She appears well-developed and well-nourished.   HENT:   Head: Normocephalic and atraumatic.   Cardiovascular: Normal rate and regular rhythm.   Pulmonary/Chest: Effort normal and breath sounds normal.   Abdominal: Soft. She exhibits no distension and no mass. There is no tenderness. There is no rebound and no guarding. No hernia.   Genitourinary:   Genitourinary Comments: Bimanual exam:  Vulva: no lesions. Normal appearance  Urethra: Normal size and location. No lesions  Bladder: No masses or tenderness.  Vagina: normal mucosa. No lesion  Cervix: normal but pulled anteriorly.   Uterus: unable to palpate due to obesity.  Adnexa: no masses.  Rectovaginal: not posterior CDS thickening.        Assessment:       1. Malignant neoplasm of sigmoid colon    2. FH: ovarian cancer        Plan:   Malignant neoplasm of sigmoid colon  -     Chelsey rios/ Emily; Future; Expected date: 03/16/2020    FH: ovarian cancer      I will be available to perform a total abdominal hysterectomy and bilateral salpingo-oophorectomy.  Consents were signed.    I also discussed genetic testing given her family history of ovarian cancer and her personal history of colon cancer.  A BRCA mutation is a possibility.  "

## 2020-03-16 NOTE — LETTER
March 17, 2020      Silvio Man MD  [address] [facility] [address]           [facility] GYN Oncology  [address]  [place] [address]  Phone: [phone]          Patient: Gail Mckinnon   MR Number: [id number]   YOB: 1970   Date of Visit: 3/16/2020       Dear Dr. Silvio Man:    Thank you for referring Gail Mckinnon to me for evaluation. Attached you will find relevant portions of my assessment and plan of care.    If you have questions, please do not hesitate to call me. I look forward to following Gail Mckinnon along with you.    Sincerely,    Keron Brady MD    Enclosure  CC:  No Recipients    If you would like to receive this communication electronically, please contact [email or web address] or [phone] to request more information on Genm[employer] Link access.    For providers and/or their staff who would like to refer a patient to [facility], please contact us through our one-stop-shop provider referral line, [facility] , at [phone].    If you feel you have received this communication in error or would no longer like to receive these types of communications, please e-mail [email or web address]

## 2020-03-17 PROBLEM — K76.0 FATTY LIVER: Status: ACTIVE | Noted: 2020-03-17

## 2020-03-17 NOTE — ASSESSMENT & PLAN NOTE
Most recent imaging 8/2018; no worrisome changes from 2015 imaging. No nodules meet criteria for FNA. Denies difficulty swallowing/breathing or other compressive symtptoms.

## 2020-03-17 NOTE — PROGRESS NOTES
[facility] - Pre Op Consult  Progress Note    Patient Name: Gail Mckinnon  MRN: [id number]  Date of Evaluation- 03/18/2020  PCP- Tima Mederos MD    Future cases for Gail Mckinnon [[id number]]     Case ID Status Date Time Kaden Procedure Provider Location    [id number] [facility] 3/25/2020  8:00  COLECTOMY, SIGMOID, LAPAROSCOPIC, flex sig, ERAS high Silvio Man MD [[id number]] [facility] OR 2ND FLR          HPI:  This is a 49 y.o. female  who presents today for a preoperative evaulation in preparation for Colon and Rectal  surgery.   She states surgery is indicated for colon cancer.  She  is new to me.  Details of current problem: The duration of problem is 9 months- was found to have colon mass 6/2019 per CT scan; delayed colonoscopy done earlier this year after patient reported anal bleeding with bowel movements. Reports symptoms of left lower abdominal tenderness.  Relieving factors are pain medication. Treated with Naproxen and Tramadol prn. Denies pain at this time.  The history has been obtained by speaking with the patient and reviewing the electronic medical record and/or outside health information.    Patient reports current health status to be Good.  Denies any new symptoms before surgery.       Subjective/ Objective:     Chief Complaint: Preoperative evaulation, perioperative medical management, and complication reduction plan.     Functional Capacity: No regular exercise regimen. Parked in garage, able to walk around hospital without CP/SOB. Can walk up one flight of stairs without STANLEY/CP.      Anesthesia issues: None    Family anesthesia difficulty: None       Active Cardiac Conditions: None    Revised Cardiac Risk Index Predictors: High Risk Surgery     Last monthly period : 2/17/20    Family Hx of Thrombosis: None    Review of Systems   Constitutional: Positive for unexpected weight change (loss of 30 lbs.- attributed to intermittent fasting at that time). Negative for activity change, chills, fatigue and  "fever.   HENT: Negative for dental problem, hearing loss, postnasal drip, rhinorrhea, sore throat, tinnitus and trouble swallowing.    Eyes: Negative for photophobia, pain, discharge and visual disturbance.   Respiratory: Positive for apnea (denies ROSE MARY). Negative for cough, chest tightness, shortness of breath and wheezing.         STOP BANG risk factors:  Snoring  BMI > 35  Large neck size     Cardiovascular: Negative for chest pain, palpitations and leg swelling.   Gastrointestinal: Positive for abdominal pain, anal bleeding and nausea (resolved with Zofran). Negative for constipation and vomiting.        GERD - denies  Denies Fatty liver, Hepatitis   Endocrine: Negative for cold intolerance, heat intolerance, polydipsia, polyphagia and polyuria.   Genitourinary: Positive for difficulty urinating (weak stream- last couple of weeks. Urine dipstick done 3/16/20 with negative results). Negative for decreased urine volume, dysuria, frequency, hematuria and urgency.   Musculoskeletal: Positive for back pain (lower). Negative for arthralgias, neck pain and neck stiffness.   Skin: Negative for rash and wound.   Allergic/Immunologic: Negative for immunocompromised state.   Neurological: Positive for dizziness (earlier this week with nausea- both symtpoms resolved with Zofran). Negative for tremors, seizures, syncope, weakness, light-headedness, numbness and headaches.   Hematological: Negative for adenopathy. Does not bruise/bleed easily.   Psychiatric/Behavioral: Negative for confusion, hallucinations, sleep disturbance and suicidal ideas.        History of Depression/Anxiety        Vitals  Vitals - 1 value per visit 3/18/2020   SYSTOLIC 121   DIASTOLIC 67   PULSE 78   TEMPERATURE 98   RESPIRATIONS    SPO2 99   Weight (lb) 264   Weight (kg) 119.75   HEIGHT 5' 6"   BODY MASS INDEX 42.61       Physical Exam   Constitutional: She is oriented to person, place, and time. She appears well-developed and well-nourished. No " distress.   obese   HENT:   Head: Normocephalic.   Nose: Nose normal.   Mouth/Throat: Oropharynx is clear and moist. No oropharyngeal exudate.   Eyes: Pupils are equal, round, and reactive to light. Conjunctivae are normal. Right eye exhibits no discharge. Left eye exhibits no discharge.   Neck: Normal range of motion. No JVD present. Carotid bruit is not present. No tracheal deviation present. Thyromegaly (anterior neck fullness) present.   Cardiovascular: Normal rate, regular rhythm, normal heart sounds and intact distal pulses.   No murmur heard.  Pulmonary/Chest: Effort normal and breath sounds normal. No stridor. No respiratory distress. She has no wheezes. She has no rhonchi. She has no rales.   Abdominal: Soft. Bowel sounds are normal. She exhibits no distension. There is tenderness (LLQ). There is no guarding.   Genitourinary:   Genitourinary Comments: Deferred    Musculoskeletal: Normal range of motion. She exhibits edema (trace edema).   Lymphadenopathy:     She has no cervical adenopathy.   Neurological: She is alert and oriented to person, place, and time. She exhibits normal muscle tone. Coordination normal.   Skin: Skin is warm and dry. Capillary refill takes less than 2 seconds. No rash noted. She is not diaphoretic. No erythema.   Psychiatric: She has a normal mood and affect.   Vitals reviewed.       Significant Labs:  Lab Results   Component Value Date    WBC 8.97 03/06/2020    HGB 11.2 (L) 03/06/2020    HCT 37.8 03/06/2020     (H) 03/06/2020    CHOL 171 11/24/2018    TRIG 150 11/24/2018    HDL 36 (L) 11/24/2018    ALT 17 03/06/2020    AST 11 03/06/2020     03/06/2020    K 3.7 03/06/2020     03/06/2020    CREATININE 0.9 03/06/2020    CREATININE 0.9 03/06/2020    BUN 11 03/06/2020    CO2 25 03/06/2020    TSH 1.036 08/28/2018       Diagnostic Studies: No relevant studies.    EKG:   Results for orders placed or performed during the hospital encounter of 03/06/20   EKG 12-lead     Collection Time: 03/06/20  3:38 PM    Narrative    Test Reason : C18.7,D49.0,    Normal sinus rhythm  Within normal limits    No previous ECGs available  Confirmed by Geoff ANGELO, Chavo ([id number]) on 3/9/2020 8:44:59 AM    Referred By: LINDA QUINTANILLA           Confirmed By:Chavo Tellez MD       2D ECHO:  TTE:  No results found for this or any previous visit.    ASHOK:  No results found for this or any previous visit.     Imaging: CT chest 3/10/20  Small hiatal hernia.  Trace pericardial effusion.  An intrapulmonary mass is not seen on today's study.  Fatty infiltration of the liver parenchyma.  cholelithiasis      Electronically signed by: Khanh Diaz MD  Date: 03/10/2020  Time: 13:04       Risk Status:  Estimated risk of cardiac complications after non-cardiac surgery using the Revised Cardiac Risk Index for Preoperative risk is 6.0 %      Revised Cardiac Risk Index   High -Risk Surgery  Intraperitoneal; Intrathoracic; suprainguinal vascular Yes- + 1 No- 0   History of Ischemic Heart Disease   (Hx of MI/positive exercise test/current chest pain due to ischemia/use of nitrate therapy/EKG with pathological Q waves) Yes- + 1 No- 0   History of CHF  (Pulmonary edema/bilateral rales or S3 gallop/PND/CXR showing pulmonary vascular redistribution) Yes- + 1 No- 0   History of CVA   (Prior stroke or TIA) Yes- + 1 No- 0   Pre-operative treatment with insulin Yes- + 1 No- 0   Pre-operative creatinine > 2mg/dl Yes- + 1 No- 0   Total: 1    ARISCAT (Canet) risk index: Intermediate    American Society of Anesthesiologists Physical Status classification (ASA): 3    [place] respiratory failure index: 0.5    NICOLE postop respiratory failure risk (For General Anesthesia): 0.6 %    No further cardiac workup needed prior to surgery.    Labs reviewed and acceptable for surgery.     Assessment/Plan:     Hypothyroid  Treated with Levothyroxine 200 mcg daily; no reports of cold/heat intolerance. Hx Hyperthyroidism - treated with radioactive  iodine.     Multinodular goiter  Most recent imaging 8/2018; no worrisome changes from 2015 imaging. No nodules meet criteria for FNA. Denies difficulty swallowing/breathing or other compressive symtptoms.    Malignant neoplasm of sigmoid colon  Followed by colon and reactal; scheduled for Lap. Colectomy 3/25/20.    Hypertension  Pt. denies hx of HTN; reports never been on medication for elevated BP. BP reading today 121/67- controlled.    Depression  Treated with Wellbutrin 150mg twice daily; Coping mechanism- doing well; stable.  Denies Suicidal ideations    Fatty liver  Found on imaging (CT chest) 3/10: Denies RUQ pain; no excessive alcohol use    Encouraged exercise of at least 150 minutes weekly; dietary changes with decreased calorie intake, balanced nutrition, and avoidance of fatty/fried foods.    Weight loss  Reports weight loss of approximately 30 lbs. x 6-8 months; pt. attributes to intermittent fasting.    Normocytic anemia  Most recent Hgb: 11.2; denies fatigue/SOB/CP. Possible  nutritional element, weight loss, and anal bleeding with bowel movements.    Hypoalbuminemia  Most recent albumin 3.1; reports intermittent fasting. Denies vomiting/diarrhea.    Hiatal hernia  Per CT chest 3/10; denies GERD or symptoms such as burning/heartburn, chest pain, difficulty swallowing or abdominal discomfort.     Body mass index (BMI) 40.0-44.9, adult  Patient would benefit from weight loss and has used intermittent fasting without success. Lifestyle changes were discussed on eating healthy, exercising at least 150 minutes weekly, and reducing sedentary behavior.     Renal stones  Per CT abdomen 2/17/20- bilateral; denies flank pain/dysuria/hematuria at this time.        Discussion/Management of Perioperative Care    Thromboembolic prophylaxis (VTE) Care: Risk factors for thrombosis include: morbid obesity.  I recommend prophylaxis of thromboembolism with the use of compression stockings/pneumatic devices, and/or  pharmacologic agents. The benefits should outweigh the risks for pharmacologic prophylaxis in the perioperative period. I also encourage early ambulation if not contraindicated during the post-operative period.    Risk factors for post-operative pulmonary complications include:HTN. To reduce the risk of pulmonary complications, prophylactic recommendations include: incentive spirometry use/deep breathing, early ambulation, pain control and oral care.    Risk factors for renal complications include: HTN. To reduce the risk of postoperative renal complications, I recommend the patient maintain adequate fluid volume status by drinking 2 liters of water daily.  Avoid/reduce NSAIDS and ACUÑA-2 inhibitors use as well as IV contrast for renal protection.    I recommend the use of appropriate prophylactic antibiotics to reduce the risk of surgical site infections.    The patient is at an increased risk for urinary retention due to : gynecological procedure. I recommend to avoid/decrease the use of benzodiazepines, anticholinergics, and Benadryl in the perioperative period. I also recommend using opioids for the shortest period of time if possible.      This visit was focused on Preoperative evaluation, Perioperative Medical management, complication reduction plans. I suggest that the patient follows up with primary care or relevant sub specialists for ongoing health care.    I appreciate the opportunity to be involved in this patients care. Please feel free to contact me if there were any questions about this consultation.    Patient is optimized for surgery with Dejuan Man and Jose Antonio.    Will order INR AM of surgery for recent finding of fatty liver.    Ritesh Saleh NP  Perioperative Medicine  [facility]

## 2020-03-17 NOTE — ASSESSMENT & PLAN NOTE
Treated with Wellbutrin 150mg twice daily; Coping mechanism- doing well; stable.  Denies Suicidal ideations

## 2020-03-17 NOTE — ASSESSMENT & PLAN NOTE
Found on imaging (CT chest) 3/10: Denies RUQ pain; no excessive alcohol use    Encouraged exercise of at least 150 minutes weekly; dietary changes with decreased calorie intake, balanced nutrition, and avoidance of fatty/fried foods.

## 2020-03-17 NOTE — ASSESSMENT & PLAN NOTE
Treated with Levothyroxine 200 mcg daily; no reports of cold/heat intolerance. Hx Hyperthyroidism - treated with radioactive iodine.

## 2020-03-17 NOTE — ASSESSMENT & PLAN NOTE
Pt. denies hx of HTN; reports never been on medication for elevated BP. BP reading today 121/67- controlled.

## 2020-03-18 ENCOUNTER — TELEPHONE (OUTPATIENT)
Dept: HEMATOLOGY/ONCOLOGY | Facility: CLINIC | Age: 50
End: 2020-03-18

## 2020-03-18 ENCOUNTER — INITIAL CONSULT (OUTPATIENT)
Dept: INTERNAL MEDICINE | Facility: CLINIC | Age: 50
End: 2020-03-18
Payer: COMMERCIAL

## 2020-03-18 VITALS
HEART RATE: 78 BPM | HEIGHT: 66 IN | OXYGEN SATURATION: 99 % | DIASTOLIC BLOOD PRESSURE: 67 MMHG | WEIGHT: 264 LBS | BODY MASS INDEX: 42.43 KG/M2 | SYSTOLIC BLOOD PRESSURE: 121 MMHG | TEMPERATURE: 98 F

## 2020-03-18 DIAGNOSIS — C18.7 MALIGNANT NEOPLASM OF SIGMOID COLON: ICD-10-CM

## 2020-03-18 DIAGNOSIS — I10 HYPERTENSION, UNSPECIFIED TYPE: ICD-10-CM

## 2020-03-18 DIAGNOSIS — D64.9 NORMOCYTIC ANEMIA: ICD-10-CM

## 2020-03-18 DIAGNOSIS — K44.9 HIATAL HERNIA: ICD-10-CM

## 2020-03-18 DIAGNOSIS — R63.4 WEIGHT LOSS: ICD-10-CM

## 2020-03-18 DIAGNOSIS — E04.2 MULTINODULAR GOITER: ICD-10-CM

## 2020-03-18 DIAGNOSIS — K76.0 FATTY LIVER: ICD-10-CM

## 2020-03-18 DIAGNOSIS — N20.0 RENAL STONES: ICD-10-CM

## 2020-03-18 DIAGNOSIS — F32.A DEPRESSION, UNSPECIFIED DEPRESSION TYPE: ICD-10-CM

## 2020-03-18 DIAGNOSIS — E88.09 HYPOALBUMINEMIA: ICD-10-CM

## 2020-03-18 DIAGNOSIS — E03.9 HYPOTHYROIDISM, UNSPECIFIED TYPE: Chronic | ICD-10-CM

## 2020-03-18 PROCEDURE — 99999 PR PBB SHADOW E&M-EST. PATIENT-LVL III: ICD-10-PCS | Mod: PBBFAC,,, | Performed by: NURSE PRACTITIONER

## 2020-03-18 PROCEDURE — 99244 OFF/OP CNSLTJ NEW/EST MOD 40: CPT | Mod: S$GLB,,, | Performed by: NURSE PRACTITIONER

## 2020-03-18 PROCEDURE — 99244 PR OFFICE CONSULTATION,LEVEL IV: ICD-10-PCS | Mod: S$GLB,,, | Performed by: NURSE PRACTITIONER

## 2020-03-18 PROCEDURE — 99999 PR PBB SHADOW E&M-EST. PATIENT-LVL III: CPT | Mod: PBBFAC,,, | Performed by: NURSE PRACTITIONER

## 2020-03-18 RX ORDER — IBUPROFEN 100 MG/5ML
1000 SUSPENSION, ORAL (FINAL DOSE FORM) ORAL DAILY
COMMUNITY
End: 2022-01-12

## 2020-03-18 RX ORDER — MULTIVITAMIN
1 TABLET ORAL DAILY
COMMUNITY

## 2020-03-18 NOTE — PROGRESS NOTES
I spoke with MsMiladys Mckinnon this morning.  She is scheduled for surgery in one week.  Given the current coronavirus pandemic, she has elected to be seen after her surgery is complete and the pathology is available for review.  Tentatively, I will see her on April 3rd.  She is aware to contact me should she need to be seen sooner or with any new problems.

## 2020-03-18 NOTE — OUTPATIENT SUBJECTIVE & OBJECTIVE
Outpatient Subjective & Objective      Chief Complaint: Preoperative evaulation, perioperative medical management, and complication reduction plan.     Functional Capacity: No regular exercise regimen. Parked in garage, able to walk around hospital without CP/SOB. Can walk up one flight of stairs without STANLEY/CP.      Anesthesia issues: None    Family anesthesia difficulty: None       Active Cardiac Conditions: None    Revised Cardiac Risk Index Predictors: High Risk Surgery     Last monthly period : 2/17/20    Family Hx of Thrombosis: None    Review of Systems   Constitutional: Positive for unexpected weight change (loss of 30 lbs.- attributed to intermittent fasting at that time). Negative for activity change, chills, fatigue and fever.   HENT: Negative for dental problem, hearing loss, postnasal drip, rhinorrhea, sore throat, tinnitus and trouble swallowing.    Eyes: Negative for photophobia, pain, discharge and visual disturbance.   Respiratory: Positive for apnea (denies ROSE MARY). Negative for cough, chest tightness, shortness of breath and wheezing.         STOP BANG risk factors:  Snoring  BMI > 35  Large neck size     Cardiovascular: Negative for chest pain, palpitations and leg swelling.   Gastrointestinal: Positive for abdominal pain, anal bleeding and nausea (resolved with Zofran). Negative for constipation and vomiting.        GERD - denies  Denies Fatty liver, Hepatitis   Endocrine: Negative for cold intolerance, heat intolerance, polydipsia, polyphagia and polyuria.   Genitourinary: Positive for difficulty urinating (weak stream- last couple of weeks. Urine dipstick done 3/16/20 with negative results). Negative for decreased urine volume, dysuria, frequency, hematuria and urgency.   Musculoskeletal: Positive for back pain (lower). Negative for arthralgias, neck pain and neck stiffness.   Skin: Negative for rash and wound.   Allergic/Immunologic: Negative for immunocompromised state.   Neurological: Positive  "for dizziness (earlier this week with nausea- both symtpoms resolved with Zofran). Negative for tremors, seizures, syncope, weakness, light-headedness, numbness and headaches.   Hematological: Negative for adenopathy. Does not bruise/bleed easily.   Psychiatric/Behavioral: Negative for confusion, hallucinations, sleep disturbance and suicidal ideas.        History of Depression/Anxiety        Vitals  Vitals - 1 value per visit 3/18/2020   SYSTOLIC 121   DIASTOLIC 67   PULSE 78   TEMPERATURE 98   RESPIRATIONS    SPO2 99   Weight (lb) 264   Weight (kg) 119.75   HEIGHT 5' 6"   BODY MASS INDEX 42.61       Physical Exam   Constitutional: She is oriented to person, place, and time. She appears well-developed and well-nourished. No distress.   obese   HENT:   Head: Normocephalic.   Nose: Nose normal.   Mouth/Throat: Oropharynx is clear and moist. No oropharyngeal exudate.   Eyes: Pupils are equal, round, and reactive to light. Conjunctivae are normal. Right eye exhibits no discharge. Left eye exhibits no discharge.   Neck: Normal range of motion. No JVD present. Carotid bruit is not present. No tracheal deviation present. Thyromegaly (anterior neck fullness) present.   Cardiovascular: Normal rate, regular rhythm, normal heart sounds and intact distal pulses.   No murmur heard.  Pulmonary/Chest: Effort normal and breath sounds normal. No stridor. No respiratory distress. She has no wheezes. She has no rhonchi. She has no rales.   Abdominal: Soft. Bowel sounds are normal. She exhibits no distension. There is tenderness (LLQ). There is no guarding.   Genitourinary:   Genitourinary Comments: Deferred    Musculoskeletal: Normal range of motion. She exhibits edema (trace edema).   Lymphadenopathy:     She has no cervical adenopathy.   Neurological: She is alert and oriented to person, place, and time. She exhibits normal muscle tone. Coordination normal.   Skin: Skin is warm and dry. Capillary refill takes less than 2 seconds. " No rash noted. She is not diaphoretic. No erythema.   Psychiatric: She has a normal mood and affect.   Vitals reviewed.       Significant Labs:  Lab Results   Component Value Date    WBC 8.97 03/06/2020    HGB 11.2 (L) 03/06/2020    HCT 37.8 03/06/2020     (H) 03/06/2020    CHOL 171 11/24/2018    TRIG 150 11/24/2018    HDL 36 (L) 11/24/2018    ALT 17 03/06/2020    AST 11 03/06/2020     03/06/2020    K 3.7 03/06/2020     03/06/2020    CREATININE 0.9 03/06/2020    CREATININE 0.9 03/06/2020    BUN 11 03/06/2020    CO2 25 03/06/2020    TSH 1.036 08/28/2018       Diagnostic Studies: No relevant studies.    EKG:   Results for orders placed or performed during the hospital encounter of 03/06/20   EKG 12-lead    Collection Time: 03/06/20  3:38 PM    Narrative    Test Reason : C18.7,D49.0,    Normal sinus rhythm  Within normal limits    No previous ECGs available  Confirmed by Chavo Tellez MD ([id number]) on 3/9/2020 8:44:59 AM    Referred By: LINDA UQINTANILLA           Confirmed By:Chavo Tellez MD       2D ECHO:  TTE:  No results found for this or any previous visit.    ASHOK:  No results found for this or any previous visit.     Imaging: CT chest 3/10/20  Small hiatal hernia.  Trace pericardial effusion.  An intrapulmonary mass is not seen on today's study.  Fatty infiltration of the liver parenchyma.  cholelithiasis      Electronically signed by: Khanh Diaz MD  Date: 03/10/2020  Time: 13:04       Risk Status:  Estimated risk of cardiac complications after non-cardiac surgery using the Revised Cardiac Risk Index for Preoperative risk is 6.0 %      Revised Cardiac Risk Index   High -Risk Surgery  Intraperitoneal; Intrathoracic; suprainguinal vascular Yes- + 1 No- 0   History of Ischemic Heart Disease   (Hx of MI/positive exercise test/current chest pain due to ischemia/use of nitrate therapy/EKG with pathological Q waves) Yes- + 1 No- 0   History of CHF  (Pulmonary edema/bilateral rales or S3 gallop/PND/CXR  showing pulmonary vascular redistribution) Yes- + 1 No- 0   History of CVA   (Prior stroke or TIA) Yes- + 1 No- 0   Pre-operative treatment with insulin Yes- + 1 No- 0   Pre-operative creatinine > 2mg/dl Yes- + 1 No- 0   Total: 1    ARISCAT (Canet) risk index: Intermediate    American Society of Anesthesiologists Physical Status classification (ASA): 3    Rupert respiratory failure index: 0.5    BONNIE postop respiratory failure risk (For General Anesthesia): 0.6 %    No further cardiac workup needed prior to surgery.    Outpatient Subjective & Objective

## 2020-03-18 NOTE — PRE-PROCEDURE INSTRUCTIONS
Preop instructions given. Hold asa, asa containing products, nsaids, vitamins and supplements one week prior to surgery.Medication instructions given.  Shower the night before surgery and the morning of surgery with an antibacterial soap( hibiclens or dial antibacterial soap).  Nothing on the skin once shower. Do not apply any deodorant, lotion, powder, perfume,or aftershave.  No jewelry going to surgery.  May have solid foods, gum, and hard candy until 8 hours before surgery/procedure time.  May have clear liquids( water, gatorade, powerade or apple juice) until 2 hours prior to surgery/procedure time.  No red drinks. If in doubt , drink water. Nothing to drink 2 hours before arrival time for surgery/procedure. If you are told to take medication in the morning of surgery, it may be taken with a sip of water. If your doctor tells you something different pertaining to when to stop eating or drinking, follow your doctor's instructions.  Call for changes in status or questions. Verbalizes understanding.Written preop and med instructions given.

## 2020-03-18 NOTE — ASSESSMENT & PLAN NOTE
Follow up in 2 months if still having issues with the wart.    Afrin can be used up to three days at a time for as needed nasal congestion.  Flonase or similar nasal spray can be used daily to prevent congestion and sinus pressure.    If you start to notice pain or other problems when you orgasm please let me know.   Most recent albumin 3.1; reports intermittent fasting. Denies vomiting/diarrhea.

## 2020-03-18 NOTE — DISCHARGE INSTRUCTIONS
You may shower. Dry off your incisions after showering and do not soak in a tub.  No lifting anything heavier than 15 pounds.  No driving while on opioid pain medications.    Anesthesia: General Anesthesia     You are watched continuously during your procedure by your anesthesia provider.     Youre due to have surgery. During surgery, youll be given medicine called anesthesia or anesthetic. This will keep you comfortable and pain-free. Your anesthesia provider will use general anesthesia.  What is general anesthesia?  General anesthesia puts you into a state like deep sleep. It goes into the bloodstream (IV anesthetics), into the lungs (gas anesthetics), or both. You feel nothing during the procedure. You will not remember it. During the procedure, the anesthesia provider monitors you continuously. He or she checks your heart rate and rhythm, blood pressure, breathing, and blood oxygen.  · IV anesthetics. IV anesthetics are given through an IV line in your arm. Theyre often given first. This is so you are asleep before a gas anesthetic is started. Some kinds of IV anesthetics relieve pain. Others relax you. Your doctor will decide which kind is best in your case.  · Gas anesthetics. Gas anesthetics are breathed into the lungs. They are often used to keep you asleep. They can be given through a facemask or a tube placed in your larynx or trachea (breathing tube).  ? If you have a facemask, your anesthesia provider will most likely place it over your nose and mouth while youre still awake. Youll breathe oxygen through the mask as your IV anesthetic is started. Gas anesthetic may be added through the mask.  ? If you have a tube in the larynx or trachea, it will be inserted into your throat after youre asleep.  Anesthesia tools and medicines  You will likely have:  · IV anesthetics. These are put into an IV line into your bloodstream.  · Gas anesthetics. You breathe these anesthetics into your lungs, where they  pass into your bloodstream.  · Pulse oximeter. This is a small clip that is attached to the end of your finger. This measures your blood oxygen level.  · Electrocardiography leads (electrodes). These are small sticky pads that are placed on your chest. They record your heart rate and rhythm.  · Blood pressure cuff. This reads your blood pressure.  Risks and possible complications  General anesthesia has some risks. These include:  · Breathing problems  · Nausea and vomiting  · Sore throat or hoarseness (usually temporary)  · Allergic reaction to the anesthetic  · Irregular heartbeat (rare)  · Cardiac arrest (rare)   Anesthesia safety  · Follow all instructions you are given for how long not to eat or drink before your procedure.  · Be sure your doctor knows what medicines and drugs you take. This includes over-the-counter medicines, herbs, supplements, alcohol or other drugs. You will be asked when those were last taken.  · Have an adult family member or friend drive you home after the procedure.  · For the first 24 hours after your surgery:  ? Do not drive or use heavy equipment.  ? Do not make important decisions or sign legal documents. If important decisions or signing legal documents is necessary during the first 24 hours after surgery, have a trusted family member or spouse act on your behalf.  ? Avoid alcohol.  ? Have a responsible adult stay with you. He or she can watch for problems and help keep you safe.  Date Last Reviewed: 12/1/2016  © 2869-8220 [employer]. [address] [place] [address]. All rights reserved. This information is not intended as a substitute for professional medical care. Always follow your healthcare professional's instructions  Your surgery has been scheduled for:____WED 3/25/2020______________________________________    You should report to:  ____[facility], located on the [place] side of the first floor of the           [facility] ([phone])  __X__[facility], located on the [facility] side of the            Second floor of the [facility] ([phone])  ____3rd Floor [facility] located on the [facility] side of the [facility] [phone]  Please Note   - Tell your doctor if you take Aspirin, products containing Aspirin, herbal medications  or blood thinners, such as Coumadin, Ticlid, or Plavix.  (Consult your provider regarding holding or stopping before surgery).  - Arrange for someone to drive you home following surgery.  You will not be allowed to leave the surgical facility alone or drive yourself home following sedation and anesthesia.  Before Surgery  - Stop taking all vitamins/ herbal medications 14days prior to surgery  - No Motrin/Advil (Ibuprofen) 1 day before surgery  - No Aleve (Naproxen) 7 days before surgery  - Stop Taking Asprin, products containing Asprin _____days before surgery  - Stop taking blood thinners_______days before surgery  - No Goody's/BC  Powder 7 days before surgery  - Refrain from drinking alcoholic beverages for 24hours before and after surgery  - Stop or limit smoking _________days before surgery  - You may take Tylenol for pain  Night before Surgery   Stop ALL solid food, gum, candy (including vitamins) 8 hours before arrival time.  (Please note: If your surgeon gives you different eating and drinking instructions, please follow surgeon's directions.)   Stop all CLOUDY liquids: coffee with creamer, formula, tube feeds, cloudy juices, non-human milk and breast milk with additives, 6 hours prior to arrival time.   Stop plain breast milk 4 hours prior to arrival time.   The patient should be ENCOURAGED to drink carbohydrate-rich clear liquids (sports drinks, clear juices) until 2 hours prior to arrival time.   CLEAR liquids include only water, black coffee NO creamer, clear oral rehydration drinks, clear sports drinks or clear  fruit juices (no orange juice, no pulpy juices, no apple cider). Advise patients if they can read newsprint through the liquid, it qualifies as clear liquid.    IF IN DOUBT, drink water instead.   - Take a shower or bath (shower is recommended).  Bathe with Hibiclens soap or an antibacterial soap from the neck down.  If not supplied by your surgeon, hibiclens soap will need to be purchased over the counter in pharmacy.  Rinse soap off thoroughly.  - Shampoo your hair with your regular shampoo  The Day of Surgery  · NOTHING TO  DRINK 2 hours before arrival time. If you are told to take medication on the morning of surgery, it may be taken with a sip of water.   - Take another bath or shower with hibiclens or any antibacterial soap, to reduce the chance of infection.  - Take heart and blood pressure medications with a small sip of water, as advised by the perioperative team.  - Do not take fluid pills  - You may brush your teeth and rinse your mouth, but do not swall any additional water.   - Do not apply perfumes, powder, body lotions or deodorant on the day of surgery.  - Nail polish should be removed.  - Do not wear makeup or moisturizer  - Wear comfortable clothes, such as a button front shirt and loose fitting pants.  - Leave all jewelry, including body piercings, and valuables at home.    - Bring any devices you will neeed after surgery such as crutches or canes.  - If you have sleep apnea, please bring your CPAP machine  In the event that your physical condition changes including the onset of a cold or respiratory illness, or if you have to delay or cancel your surgery, please notify your surgeon.

## 2020-03-18 NOTE — DISCHARGE INSTRUCTIONS
Your surgery has been scheduled for:    You should report to:    ____[facility], located on the [place] side of the first floor of the           [facility] ([phone])    __X_[facility], located on the [facility] side of the            Second floor of the [facility] ([phone])    Please Note   - Tell your doctor if you take Aspirin, products containing Aspirin, herbal medications or blood thinners, such as Coumadin, Ticlid, or Plavix.  (Consult your provider regarding holding or stopping before surgery).  - Arrange for someone to drive you home following surgery.  You will not be allowed to leave the surgical facility alone or drive yourself home following sedation and anesthesia.    Before Surgery  - Stop taking all vitamins/ herbal medications 7 days prior to surgery  - No Motrin/Advil (Ibuprofen) 7 days before surgery  - No Aleve (Naproxen) 7 days before surgery  - Stop taking Aspirin, products containing Aspirin 7 days before surgery  - Stop taking blood thinners_______days before surgery  - No Goody's/BC  Powder 7 days before surgery  - Refrain from drinking alcoholic beverages for 24 hours before and after surgery  - Stop or limit smoking 14 days before surgery  - You may take Tylenol for pain    Night before Surgery   Stop ALL solid food, gum, candy (including vitamins) 8 hours before arrival time.  (Please note: If your surgeon gives you different eating and drinking instructions, please follow surgeon's directions.)   Stop all CLOUDY liquids: coffee with creamer, formula, tube feeds, cloudy juices, non-human milk and breast milk with additives, 6 hours prior to arrival time.   Stop plain breast milk 4 hours prior to arrival time.   The patient should be ENCOURAGED to drink carbohydrate-rich clear liquids (sports drinks, clear juices) until 2 hours prior to arrival time.   CLEAR liquids include only water, black coffee NO  creamer, clear oral rehydration drinks, clear sports drinks or clear fruit juices (no orange juice, no pulpy juices, no apple cider). Advise patients if they can read newsprint through the liquid, it qualifies as clear liquid.    IF IN DOUBT, drink water instead.   - Take a shower or bath (shower is recommended).  Bathe with Hibiclens soap or an antibacterial soap from the neck down.  If not supplied by your surgeon, hibiclens soap will need to be purchased over the counter in pharmacy.  Rinse soap off thoroughly.  - Shampoo your hair with your regular shampoo    The Day of Surgery    NOTHING TO  DRINK 2 hours before arrival time. If you are told to take medication on the morning of surgery, it may be taken with a sip of water.   - Take another bath or shower with hibiclens or any antibacterial soap, to reduce the chance of infection.  - Take heart and blood pressure medications with a small sip of water, as advised by the perioperative team.  - Do not take fluid pills  - You may brush your teeth and rinse your mouth, but do not swallow any additional water.   - Do not apply perfumes, powder, body lotions or deodorant on the day of surgery.  - Nail polish should be removed.  - Do not wear makeup or moisturizer.  - Wear comfortable clothes, such as a button front shirt and loose fitting pants.  - Leave all jewelry, including body piercings, and valuables at home.    - Bring any devices you will neeed after surgery such as crutches or canes.  - If you have sleep apnea, please bring your CPAP machine.    In the event that your physical condition changes including the onset of a cold or respiratory illness, or if you have to delay or cancel your surgery, please notify your surgeon.    Anesthesia: General Anesthesia     You are watched continuously during your procedure by your anesthesia provider.     Youre due to have surgery. During surgery, youll be given medicine called anesthesia or anesthetic. This will keep  you comfortable and pain-free. Your anesthesia provider will use general anesthesia.  What is general anesthesia?  General anesthesia puts you into a state like deep sleep. It goes into the bloodstream (IV anesthetics), into the lungs (gas anesthetics), or both. You feel nothing during the procedure. You will not remember it. During the procedure, the anesthesia provider monitors you continuously. He or she checks your heart rate and rhythm, blood pressure, breathing, and blood oxygen.  · IV anesthetics. IV anesthetics are given through an IV line in your arm. Theyre often given first. This is so you are asleep before a gas anesthetic is started. Some kinds of IV anesthetics relieve pain. Others relax you. Your doctor will decide which kind is best in your case.  · Gas anesthetics. Gas anesthetics are breathed into the lungs. They are often used to keep you asleep. They can be given through a facemask or a tube placed in your larynx or trachea (breathing tube).  ? If you have a facemask, your anesthesia provider will most likely place it over your nose and mouth while youre still awake. Youll breathe oxygen through the mask as your IV anesthetic is started. Gas anesthetic may be added through the mask.  ? If you have a tube in the larynx or trachea, it will be inserted into your throat after youre asleep.  Anesthesia tools and medicines  You will likely have:  · IV anesthetics. These are put into an IV line into your bloodstream.  · Gas anesthetics. You breathe these anesthetics into your lungs, where they pass into your bloodstream.  · Pulse oximeter. This is a small clip that is attached to the end of your finger. This measures your blood oxygen level.  · Electrocardiography leads (electrodes). These are small sticky pads that are placed on your chest. They record your heart rate and rhythm.  · Blood pressure cuff. This reads your blood pressure.  Risks and possible complications  General anesthesia has some  risks. These include:  · Breathing problems  · Nausea and vomiting  · Sore throat or hoarseness (usually temporary)  · Allergic reaction to the anesthetic  · Irregular heartbeat (rare)  · Cardiac arrest (rare)   Anesthesia safety  · Follow all instructions you are given for how long not to eat or drink before your procedure.  · Be sure your doctor knows what medicines and drugs you take. This includes over-the-counter medicines, herbs, supplements, alcohol or other drugs. You will be asked when those were last taken.  · Have an adult family member or friend drive you home after the procedure.  · For the first 24 hours after your surgery:  ? Do not drive or use heavy equipment.  ? Do not make important decisions or sign legal documents. If important decisions or signing legal documents is necessary during the first 24 hours after surgery, have a trusted family member or spouse act on your behalf.  ? Avoid alcohol.  ? Have a responsible adult stay with you. He or she can watch for problems and help keep you safe.  Date Last Reviewed: 12/1/2016 © 2000-2017 [employer]. [address] [place] [address], [place] [address]. All rights reserved. This information is not intended as a substitute for professional medical care. Always follow your healthcare professional's instructions

## 2020-03-18 NOTE — HPI
This is a 49 y.o. female  who presents today for a preoperative evaulation in preparation for Colon and Rectal  surgery.   She states surgery is indicated for colon cancer.  She  is new to me.  Details of current problem: The duration of problem is 9 months- was found to have colon mass 6/2019 per CT scan; delayed colonoscopy done earlier this year after patient reported anal bleeding with bowel movements. Reports symptoms of left lower abdominal tenderness.  Relieving factors are pain medication. Treated with Naproxen and Tramadol prn. Denies pain at this time.  The history has been obtained by speaking with the patient and reviewing the electronic medical record and/or outside health information.    Patient reports current health status to be Good.  Denies any new symptoms before surgery.

## 2020-03-18 NOTE — ASSESSMENT & PLAN NOTE
Per CT chest 3/10; denies GERD or symptoms such as burning/heartburn, chest pain, difficulty swallowing or abdominal discomfort.

## 2020-03-18 NOTE — ASSESSMENT & PLAN NOTE
Most recent Hgb: 11.2; denies fatigue/SOB/CP. Possible  nutritional element, weight loss, and anal bleeding with bowel movements.

## 2020-03-18 NOTE — LETTER
March 18, 2020      Silivo Man MD  [address] [facility] [address]           [address] - Pre Op Consult  [address] [facility] [address]  Phone: [phone]          Patient: Gail Mckinnon   MR Number: [id number]   YOB: 1970   Date of Visit: 3/18/2020       Dear Dr. Silvio Man:    Thank you for referring Gail Mckinnon to me for evaluation. Attached you will find relevant portions of my assessment and plan of care.    If you have questions, please do not hesitate to call me. I look forward to following Gail Mckinnon along with you.    Sincerely,    Ritesh Saleh, NP    Enclosure  CC:  No Recipients    If you would like to receive this communication electronically, please contact [email or web address] or [phone] to request more information on [employer] access.    For providers and/or their staff who would like to refer a patient to [facility], please contact us through our one-stop-shop provider referral line, [facility], at [phone].    If you feel you have received this communication in error or would no longer like to receive these types of communications, please e-mail [email or web address]

## 2020-03-18 NOTE — ASSESSMENT & PLAN NOTE
Reports weight loss of approximately 30 lbs. x 6-8 months; pt. attributes to intermittent fasting.

## 2020-03-18 NOTE — ASSESSMENT & PLAN NOTE
Patient would benefit from weight loss and has used intermittent fasting without success. Lifestyle changes were discussed on eating healthy, exercising at least 150 minutes weekly, and reducing sedentary behavior.

## 2020-03-19 ENCOUNTER — PATIENT MESSAGE (OUTPATIENT)
Dept: SURGERY | Facility: HOSPITAL | Age: 50
End: 2020-03-19

## 2020-03-24 ENCOUNTER — ANESTHESIA EVENT (OUTPATIENT)
Dept: SURGERY | Facility: HOSPITAL | Age: 50
DRG: 330 | End: 2020-03-24
Payer: COMMERCIAL

## 2020-03-24 ENCOUNTER — TELEPHONE (OUTPATIENT)
Dept: SURGERY | Facility: CLINIC | Age: 50
End: 2020-03-24

## 2020-03-24 ENCOUNTER — PATIENT MESSAGE (OUTPATIENT)
Dept: HEMATOLOGY/ONCOLOGY | Facility: CLINIC | Age: 50
End: 2020-03-24

## 2020-03-24 NOTE — ANESTHESIA PREPROCEDURE EVALUATION
03/24/2020  [facility]  Anesthesia Pre-Operative Evaluation         Patient Name: Gail Mckinnon  YOB: 1970  MRN: [id number]    SUBJECTIVE:     Pre-operative evaluation for Procedure(s) (LRB):  COLECTOMY, SIGMOID, LAPAROSCOPIC, flex sig, ERAS high (N/A)  HYSTERECTOMY, TOTAL, ABDOMINAL, WITH BILATERAL SALPINGO-OOPHORECTOMY (N/A)  INSERTION, CATHETER, URETER (Bilateral)     03/24/2020    Gail Mckinnon is a 49 y.o. female w/ a significant PMHx of HTN, Anemia, Renal stones, Depression and recently diagnosed sigmoid colon cancer with apparent local involvement of the uterus and ovary.     Patient now presents for the above procedure(s).      LDA: None documented.    Prev airway: None documented.    Drips: None documented.      Patient Active Problem List   Diagnosis    Hypothyroid    Multinodular goiter    Depression    Hypertension    Screen for colon cancer    Malignant neoplasm of sigmoid colon    FH: ovarian cancer    Fatty liver    Weight loss    Normocytic anemia    Hypoalbuminemia    Hiatal hernia    Body mass index (BMI) 40.0-44.9, adult    Renal stones       Review of patient's allergies indicates:   Allergen Reactions    Penicillin g      unknown    Penicillins Hives     childhood allergy       Current Inpatient Medications:      No current facility-administered medications on file prior to encounter.      Current Outpatient Medications on File Prior to Encounter   Medication Sig Dispense Refill    buPROPion (WELLBUTRIN SR) 150 MG TBSR 12 hr tablet Take 1 tablet (150 mg total) by mouth 2 (two) times daily. 180 tablet 0    busPIRone (BUSPAR) 10 MG tablet Take 1 tablet (10 mg total) by mouth 2 (two) times daily. (Patient taking differently: Take 10 mg by mouth 2 (two) times daily as needed. ) 180 tablet 0    cyclobenzaprine (FLEXERIL) 10 MG  tablet Take 1 tablet (10 mg total) by mouth nightly. (Patient taking differently: Take 10 mg by mouth nightly as needed. ) 30 tablet 0    ketorolac (TORADOL) 10 mg tablet Take 10 mg by mouth every 6 (six) hours as needed.  0    levothyroxine (SYNTHROID) 200 MCG tablet TAKE 1 TABLET BY MOUTH ONCE DAILY BEFORE BREAKFAST 90 tablet 3    metroNIDAZOLE (FLAGYL) 500 MG tablet Take 1 tablet at 1pm, 2pm, and 11pm the day before surgery. 3 tablet 0    naproxen (NAPROSYN) 500 MG tablet Take 1 tablet (500 mg total) by mouth 2 (two) times daily with meals. (Patient taking differently: Take 500 mg by mouth as needed. ) 15 tablet 0    neomycin (MYCIFRADIN) 500 mg Tab Take 2 tablets at 1pm, 2pm, and 11pm the day before surgery. 6 tablet 0    ondansetron (ZOFRAN) 4 MG tablet Take 1 tablet (4 mg total) by mouth every 8 (eight) hours as needed for Nausea. 15 tablet 0    traMADol (ULTRAM) 50 mg tablet Take 1 tablet (50 mg total) by mouth 3 (three) times daily as needed for Pain. 90 tablet 2       Past Surgical History:   Procedure Laterality Date     SECTION, CLASSIC      x3    COLONOSCOPY N/A 2020    Procedure: COLONOSCOPY;  Surgeon: Shane Parker MD;  Location: [facility];  Service: Endoscopy;  Laterality: N/A;    tonsillectomy         Social History     Socioeconomic History    Marital status:      Spouse name: Not on file    Number of children: Not on file    Years of education: Not on file    Highest education level: Not on file   Occupational History    Not on file   Social Needs    Financial resource strain: Not on file    Food insecurity:     Worry: Not on file     Inability: Not on file    Transportation needs:     Medical: Not on file     Non-medical: Not on file   Tobacco Use    Smoking status: Never Smoker    Smokeless tobacco: Never Used   Substance and Sexual Activity    Alcohol use: Yes     Comment: occasionally- twice monthly    Drug use: Never    Sexual activity: Not on  file   Lifestyle    Physical activity:     Days per week: Not on file     Minutes per session: Not on file    Stress: Not on file   Relationships    Social connections:     Talks on phone: Not on file     Gets together: Not on file     Attends Restoration service: Not on file     Active member of club or organization: Not on file     Attends meetings of clubs or organizations: Not on file     Relationship status: Not on file   Other Topics Concern    Not on file   Social History Narrative    Not on file       OBJECTIVE:     Vital Signs Range (Last 24H):         Significant Labs:  Lab Results   Component Value Date    WBC 8.97 03/06/2020    HGB 11.2 (L) 03/06/2020    HCT 37.8 03/06/2020     (H) 03/06/2020    CHOL 171 11/24/2018    TRIG 150 11/24/2018    HDL 36 (L) 11/24/2018    ALT 17 03/06/2020    AST 11 03/06/2020     03/06/2020    K 3.7 03/06/2020     03/06/2020    CREATININE 0.9 03/06/2020    CREATININE 0.9 03/06/2020    BUN 11 03/06/2020    CO2 25 03/06/2020    TSH 1.036 08/28/2018       Diagnostic Studies: No relevant studies.    EKG:   Results for orders placed or performed during the hospital encounter of 03/06/20   EKG 12-lead    Collection Time: 03/06/20  3:38 PM    Narrative    Test Reason : C18.7,D49.0,    Vent. Rate : 088 BPM     Atrial Rate : 088 BPM     P-R Int : 188 ms          QRS Dur : 092 ms      QT Int : 360 ms       P-R-T Axes : 062 060 051 degrees     QTc Int : 435 ms    Normal sinus rhythm  Within normal limits    No previous ECGs available  Confirmed by Chavo Tellez MD ([id number]) on 3/9/2020 8:44:59 AM    Referred By: LINDA QUINTANILLA           Confirmed By:Chavo Tellez MD       2D ECHO:  TTE:  No results found for this or any previous visit.    ASHOK:  No results found for this or any previous visit.    ASSESSMENT/PLAN:         Anesthesia Evaluation    I have reviewed the Patient Summary Reports.    I have reviewed the Nursing Notes.   I have reviewed the Medications.     Review  of Systems  Anesthesia Hx:  No problems with previous Anesthesia  History of prior surgery of interest to airway management or planning: Denies Family Hx of Anesthesia complications.   Denies Personal Hx of Anesthesia complications.   Social:  Non-Smoker, Social Alcohol Use    Hematology/Oncology:         -- Anemia: Current/Recent Cancer. (colon cancer)   EENT/Dental:EENT/Dental Normal   Cardiovascular:   Hypertension    Pulmonary:  Pulmonary Normal    Renal/:   renal calculi    Hepatic/GI:   Hiatal Hernia, Liver Disease, (Fatty Liver)    Neurological:  Neurology Normal    Endocrine:   Hypothyroidism    Psych:   depression          Physical Exam  General:  Well nourished, Obesity    Airway/Jaw/Neck:  Airway Findings: Mouth Opening: Normal Tongue: Normal  General Airway Assessment: Adult  Mallampati: II  Improves to I with phonation.  TM Distance: Normal, at least 6 cm  Jaw/Neck Findings:  Neck ROM: Normal ROM     Eyes/Ears/Nose:  EYES/EARS/NOSE FINDINGS: Normal   Dental:  Dental Findings: In tact    Chest/Lungs:  Chest/Lungs Findings: Clear to auscultation, Normal Respiratory Rate     Heart/Vascular:  Heart Findings: Rate: Normal  Rhythm: Regular Rhythm        Mental Status:  Mental Status Findings:  Cooperative, Alert and Oriented         Anesthesia Plan  Type of Anesthesia, risks & benefits discussed:  Anesthesia Type:  general  Patient's Preference:   Intra-op Monitoring Plan: standard ASA monitors and arterial line  Intra-op Monitoring Plan Comments:   Post Op Pain Control Plan: per primary service following discharge from PACU, IV/PO Opioids PRN and multimodal analgesia  Post Op Pain Control Plan Comments:   Induction:   IV  Beta Blocker:  Patient is not currently on a Beta-Blocker (No further documentation required).       Informed Consent: Patient understands risks and agrees with Anesthesia plan.  Questions answered. Anesthesia consent signed with patient.  ASA Score: 3     Day of Surgery Review of History  & Physical:    H&P update referred to the provider.     Anesthesia Plan Notes: High Risk ERAS protocol; Ask surgeon if they will be using Exparel.        Ready For Surgery From Anesthesia Perspective.

## 2020-03-25 ENCOUNTER — ANESTHESIA (OUTPATIENT)
Dept: SURGERY | Facility: HOSPITAL | Age: 50
DRG: 330 | End: 2020-03-25
Payer: COMMERCIAL

## 2020-03-25 ENCOUNTER — PATIENT MESSAGE (OUTPATIENT)
Dept: SURGERY | Facility: CLINIC | Age: 50
End: 2020-03-25

## 2020-03-25 ENCOUNTER — HOSPITAL ENCOUNTER (INPATIENT)
Facility: HOSPITAL | Age: 50
LOS: 3 days | Discharge: HOME | DRG: 330 | End: 2020-03-28
Attending: COLON & RECTAL SURGERY | Admitting: COLON & RECTAL SURGERY
Payer: COMMERCIAL

## 2020-03-25 DIAGNOSIS — K76.0 FATTY LIVER: ICD-10-CM

## 2020-03-25 DIAGNOSIS — C18.7 MALIGNANT NEOPLASM OF SIGMOID COLON: Primary | ICD-10-CM

## 2020-03-25 LAB
ABO + RH BLD: NORMAL
B-HCG UR QL: NEGATIVE
BLD GP AB SCN CELLS X3 SERPL QL: NORMAL
CTP QC/QA: YES
INR PPP: 1.1 (ref 0.8–1.2)
PROTHROMBIN TIME: 11 SEC (ref 9–12.5)

## 2020-03-25 PROCEDURE — 58150 PR TOTAL ABDOM HYSTERECTOMY: ICD-10-PCS | Mod: ,,, | Performed by: OBSTETRICS & GYNECOLOGY

## 2020-03-25 PROCEDURE — 36415 COLL VENOUS BLD VENIPUNCTURE: CPT

## 2020-03-25 PROCEDURE — 88342 IMHCHEM/IMCYTCHM 1ST ANTB: CPT | Mod: 26,,, | Performed by: PATHOLOGY

## 2020-03-25 PROCEDURE — C9290 INJ, BUPIVACAINE LIPOSOME: HCPCS | Performed by: COLON & RECTAL SURGERY

## 2020-03-25 PROCEDURE — 25000003 PHARM REV CODE 250: Performed by: NURSE PRACTITIONER

## 2020-03-25 PROCEDURE — 44213 LAP MOBIL SPLENIC FL ADD-ON: CPT | Mod: ,,, | Performed by: COLON & RECTAL SURGERY

## 2020-03-25 PROCEDURE — 44207 L COLECTOMY/COLOPROCTOSTOMY: CPT | Mod: ,,, | Performed by: COLON & RECTAL SURGERY

## 2020-03-25 PROCEDURE — 44207 PR LAP,SURG,COLECTOMY,W/ANAST: ICD-10-PCS | Mod: ,,, | Performed by: COLON & RECTAL SURGERY

## 2020-03-25 PROCEDURE — C1758 CATHETER, URETERAL: HCPCS | Performed by: COLON & RECTAL SURGERY

## 2020-03-25 PROCEDURE — S0030 INJECTION, METRONIDAZOLE: HCPCS | Performed by: NURSE PRACTITIONER

## 2020-03-25 PROCEDURE — 81025 URINE PREGNANCY TEST: CPT | Performed by: COLON & RECTAL SURGERY

## 2020-03-25 PROCEDURE — 27201037 HC PRESSURE MONITORING SET UP

## 2020-03-25 PROCEDURE — 36000710: Performed by: COLON & RECTAL SURGERY

## 2020-03-25 PROCEDURE — 88302 PR  SURG PATH,LEVEL II: ICD-10-PCS | Mod: 26,,, | Performed by: PATHOLOGY

## 2020-03-25 PROCEDURE — 25000003 PHARM REV CODE 250: Performed by: STUDENT IN AN ORGANIZED HEALTH CARE EDUCATION/TRAINING PROGRAM

## 2020-03-25 PROCEDURE — 88341 IMHCHEM/IMCYTCHM EA ADD ANTB: CPT | Mod: 26,,, | Performed by: PATHOLOGY

## 2020-03-25 PROCEDURE — D9220A PRA ANESTHESIA: ICD-10-PCS | Mod: ANES,,, | Performed by: ANESTHESIOLOGY

## 2020-03-25 PROCEDURE — A4216 STERILE WATER/SALINE, 10 ML: HCPCS | Performed by: COLON & RECTAL SURGERY

## 2020-03-25 PROCEDURE — 37000008 HC ANESTHESIA 1ST 15 MINUTES: Performed by: COLON & RECTAL SURGERY

## 2020-03-25 PROCEDURE — D9220A PRA ANESTHESIA: ICD-10-PCS | Mod: CRNA,,, | Performed by: NURSE ANESTHETIST, CERTIFIED REGISTERED

## 2020-03-25 PROCEDURE — C1769 GUIDE WIRE: HCPCS | Performed by: COLON & RECTAL SURGERY

## 2020-03-25 PROCEDURE — 36000711: Performed by: COLON & RECTAL SURGERY

## 2020-03-25 PROCEDURE — 36620 INSERTION CATHETER ARTERY: CPT | Mod: 59,,, | Performed by: ANESTHESIOLOGY

## 2020-03-25 PROCEDURE — 88309 PR  SURG PATH,LEVEL VI: ICD-10-PCS | Mod: 26,,, | Performed by: PATHOLOGY

## 2020-03-25 PROCEDURE — 25000003 PHARM REV CODE 250: Performed by: COLON & RECTAL SURGERY

## 2020-03-25 PROCEDURE — 63600175 PHARM REV CODE 636 W HCPCS: Performed by: STUDENT IN AN ORGANIZED HEALTH CARE EDUCATION/TRAINING PROGRAM

## 2020-03-25 PROCEDURE — 71000039 HC RECOVERY, EACH ADD'L HOUR: Performed by: COLON & RECTAL SURGERY

## 2020-03-25 PROCEDURE — D9220A PRA ANESTHESIA: Mod: CRNA,,, | Performed by: NURSE ANESTHETIST, CERTIFIED REGISTERED

## 2020-03-25 PROCEDURE — 88342 IMHCHEM/IMCYTCHM 1ST ANTB: CPT | Performed by: PATHOLOGY

## 2020-03-25 PROCEDURE — 88302 TISSUE EXAM BY PATHOLOGIST: CPT | Mod: 26,,, | Performed by: PATHOLOGY

## 2020-03-25 PROCEDURE — D9220A PRA ANESTHESIA: Mod: ANES,,, | Performed by: ANESTHESIOLOGY

## 2020-03-25 PROCEDURE — 58150 TOTAL HYSTERECTOMY: CPT | Mod: ,,, | Performed by: OBSTETRICS & GYNECOLOGY

## 2020-03-25 PROCEDURE — 63600175 PHARM REV CODE 636 W HCPCS: Performed by: NURSE PRACTITIONER

## 2020-03-25 PROCEDURE — C1765 ADHESION BARRIER: HCPCS | Performed by: COLON & RECTAL SURGERY

## 2020-03-25 PROCEDURE — 71000016 HC POSTOP RECOV ADDL HR: Performed by: COLON & RECTAL SURGERY

## 2020-03-25 PROCEDURE — 36620 PR INSERT CATH,ART,PERCUT,SHORTTERM: ICD-10-PCS | Mod: 59,,, | Performed by: ANESTHESIOLOGY

## 2020-03-25 PROCEDURE — 86900 BLOOD TYPING SEROLOGIC ABO: CPT

## 2020-03-25 PROCEDURE — 94761 N-INVAS EAR/PLS OXIMETRY MLT: CPT

## 2020-03-25 PROCEDURE — 27201423 OPTIME MED/SURG SUP & DEVICES STERILE SUPPLY: Performed by: COLON & RECTAL SURGERY

## 2020-03-25 PROCEDURE — 44213 PR LAP, SURG MOBIL SPLENIC FL DUR PTL COLECTOMY: ICD-10-PCS | Mod: ,,, | Performed by: COLON & RECTAL SURGERY

## 2020-03-25 PROCEDURE — 88341 IMHCHEM/IMCYTCHM EA ADD ANTB: CPT | Performed by: PATHOLOGY

## 2020-03-25 PROCEDURE — 71000015 HC POSTOP RECOV 1ST HR: Performed by: COLON & RECTAL SURGERY

## 2020-03-25 PROCEDURE — 88309 TISSUE EXAM BY PATHOLOGIST: CPT | Mod: 26,,, | Performed by: PATHOLOGY

## 2020-03-25 PROCEDURE — 20600001 HC STEP DOWN PRIVATE ROOM

## 2020-03-25 PROCEDURE — 88341 PR IHC OR ICC EACH ADD'L SINGLE ANTIBODY  STAINPR: ICD-10-PCS | Mod: 26,,, | Performed by: PATHOLOGY

## 2020-03-25 PROCEDURE — 71000033 HC RECOVERY, INTIAL HOUR: Performed by: COLON & RECTAL SURGERY

## 2020-03-25 PROCEDURE — 85610 PROTHROMBIN TIME: CPT

## 2020-03-25 PROCEDURE — 88307 TISSUE EXAM BY PATHOLOGIST: CPT | Mod: 26,,, | Performed by: PATHOLOGY

## 2020-03-25 PROCEDURE — 88309 TISSUE EXAM BY PATHOLOGIST: CPT | Performed by: PATHOLOGY

## 2020-03-25 PROCEDURE — 37000009 HC ANESTHESIA EA ADD 15 MINS: Performed by: COLON & RECTAL SURGERY

## 2020-03-25 PROCEDURE — 88302 TISSUE EXAM BY PATHOLOGIST: CPT | Performed by: PATHOLOGY

## 2020-03-25 PROCEDURE — 63600175 PHARM REV CODE 636 W HCPCS: Performed by: COLON & RECTAL SURGERY

## 2020-03-25 PROCEDURE — 86920 COMPATIBILITY TEST SPIN: CPT

## 2020-03-25 PROCEDURE — 88342 CHG IMMUNOCYTOCHEMISTRY: ICD-10-PCS | Mod: 26,,, | Performed by: PATHOLOGY

## 2020-03-25 PROCEDURE — 88307 PR  SURG PATH,LEVEL V: ICD-10-PCS | Mod: 26,,, | Performed by: PATHOLOGY

## 2020-03-25 DEVICE — MEMBRANE SEPRAFILM 5 X 6: Type: IMPLANTABLE DEVICE | Site: ABDOMEN | Status: FUNCTIONAL

## 2020-03-25 RX ORDER — MUPIROCIN 20 MG/G
OINTMENT TOPICAL 2 TIMES DAILY
Status: DISCONTINUED | OUTPATIENT
Start: 2020-03-25 | End: 2020-03-28 | Stop reason: HOSPADM

## 2020-03-25 RX ORDER — METRONIDAZOLE 500 MG/100ML
500 INJECTION, SOLUTION INTRAVENOUS
Status: COMPLETED | OUTPATIENT
Start: 2020-03-25 | End: 2020-03-25

## 2020-03-25 RX ORDER — OXYBUTYNIN CHLORIDE 5 MG/1
5 TABLET ORAL 3 TIMES DAILY
Status: DISCONTINUED | OUTPATIENT
Start: 2020-03-25 | End: 2020-03-25

## 2020-03-25 RX ORDER — LIDOCAINE HYDROCHLORIDE 10 MG/ML
1 INJECTION, SOLUTION EPIDURAL; INFILTRATION; INTRACAUDAL; PERINEURAL
Status: COMPLETED | OUTPATIENT
Start: 2020-03-25 | End: 2020-03-25

## 2020-03-25 RX ORDER — SODIUM CHLORIDE 9 MG/ML
INJECTION, SOLUTION INTRAVENOUS CONTINUOUS PRN
Status: DISCONTINUED | OUTPATIENT
Start: 2020-03-25 | End: 2020-03-25

## 2020-03-25 RX ORDER — CYCLOBENZAPRINE HCL 5 MG
10 TABLET ORAL NIGHTLY PRN
Status: DISCONTINUED | OUTPATIENT
Start: 2020-03-25 | End: 2020-03-28 | Stop reason: HOSPADM

## 2020-03-25 RX ORDER — LIDOCAINE HCL/PF 100 MG/5ML
SYRINGE (ML) INTRAVENOUS
Status: DISCONTINUED | OUTPATIENT
Start: 2020-03-25 | End: 2020-03-25

## 2020-03-25 RX ORDER — SODIUM CHLORIDE 0.9 % (FLUSH) 0.9 %
10 SYRINGE (ML) INJECTION
Status: DISCONTINUED | OUTPATIENT
Start: 2020-03-25 | End: 2020-03-28 | Stop reason: HOSPADM

## 2020-03-25 RX ORDER — ENOXAPARIN SODIUM 100 MG/ML
40 INJECTION SUBCUTANEOUS EVERY 24 HOURS
Status: DISCONTINUED | OUTPATIENT
Start: 2020-03-26 | End: 2020-03-26

## 2020-03-25 RX ORDER — OXYCODONE HYDROCHLORIDE 5 MG/1
5 TABLET ORAL EVERY 4 HOURS PRN
Status: DISCONTINUED | OUTPATIENT
Start: 2020-03-25 | End: 2020-03-26

## 2020-03-25 RX ORDER — LIDOCAINE HYDROCHLORIDE ANHYDROUS AND DEXTROSE MONOHYDRATE .8; 5 G/100ML; G/100ML
INJECTION, SOLUTION INTRAVENOUS CONTINUOUS PRN
Status: DISCONTINUED | OUTPATIENT
Start: 2020-03-25 | End: 2020-03-25

## 2020-03-25 RX ORDER — DEXAMETHASONE SODIUM PHOSPHATE 4 MG/ML
INJECTION, SOLUTION INTRA-ARTICULAR; INTRALESIONAL; INTRAMUSCULAR; INTRAVENOUS; SOFT TISSUE
Status: DISCONTINUED | OUTPATIENT
Start: 2020-03-25 | End: 2020-03-25

## 2020-03-25 RX ORDER — ACETAMINOPHEN 10 MG/ML
1000 INJECTION, SOLUTION INTRAVENOUS EVERY 8 HOURS
Status: COMPLETED | OUTPATIENT
Start: 2020-03-25 | End: 2020-03-26

## 2020-03-25 RX ORDER — ONDANSETRON 2 MG/ML
4 INJECTION INTRAMUSCULAR; INTRAVENOUS EVERY 6 HOURS PRN
Status: DISCONTINUED | OUTPATIENT
Start: 2020-03-25 | End: 2020-03-28 | Stop reason: HOSPADM

## 2020-03-25 RX ORDER — GABAPENTIN 300 MG/1
300 CAPSULE ORAL 3 TIMES DAILY
Status: DISCONTINUED | OUTPATIENT
Start: 2020-03-25 | End: 2020-03-28 | Stop reason: HOSPADM

## 2020-03-25 RX ORDER — ACETAMINOPHEN 500 MG
1000 TABLET ORAL EVERY 8 HOURS
Status: DISCONTINUED | OUTPATIENT
Start: 2020-03-26 | End: 2020-03-28 | Stop reason: HOSPADM

## 2020-03-25 RX ORDER — PHENYLEPHRINE HYDROCHLORIDE 10 MG/ML
INJECTION INTRAVENOUS
Status: DISCONTINUED | OUTPATIENT
Start: 2020-03-25 | End: 2020-03-25

## 2020-03-25 RX ORDER — ROCURONIUM BROMIDE 10 MG/ML
INJECTION, SOLUTION INTRAVENOUS
Status: DISCONTINUED | OUTPATIENT
Start: 2020-03-25 | End: 2020-03-25

## 2020-03-25 RX ORDER — BUPROPION HYDROCHLORIDE 150 MG/1
300 TABLET ORAL DAILY
Status: DISCONTINUED | OUTPATIENT
Start: 2020-03-25 | End: 2020-03-28 | Stop reason: HOSPADM

## 2020-03-25 RX ORDER — MUPIROCIN 20 MG/G
1 OINTMENT TOPICAL
Status: COMPLETED | OUTPATIENT
Start: 2020-03-25 | End: 2020-03-25

## 2020-03-25 RX ORDER — FENTANYL CITRATE 50 UG/ML
25 INJECTION, SOLUTION INTRAMUSCULAR; INTRAVENOUS EVERY 5 MIN PRN
Status: COMPLETED | OUTPATIENT
Start: 2020-03-25 | End: 2020-03-25

## 2020-03-25 RX ORDER — OXYBUTYNIN CHLORIDE 5 MG/1
5 TABLET ORAL 3 TIMES DAILY
Status: COMPLETED | OUTPATIENT
Start: 2020-03-25 | End: 2020-03-28

## 2020-03-25 RX ORDER — GABAPENTIN 300 MG/1
300 CAPSULE ORAL 3 TIMES DAILY
Status: DISCONTINUED | OUTPATIENT
Start: 2020-03-25 | End: 2020-03-25

## 2020-03-25 RX ORDER — LEVOTHYROXINE SODIUM 100 UG/1
200 TABLET ORAL DAILY
Status: DISCONTINUED | OUTPATIENT
Start: 2020-03-25 | End: 2020-03-28 | Stop reason: HOSPADM

## 2020-03-25 RX ORDER — MIDAZOLAM HYDROCHLORIDE 1 MG/ML
INJECTION, SOLUTION INTRAMUSCULAR; INTRAVENOUS
Status: DISCONTINUED | OUTPATIENT
Start: 2020-03-25 | End: 2020-03-25

## 2020-03-25 RX ORDER — SODIUM CHLORIDE 9 MG/ML
INJECTION, SOLUTION INTRAVENOUS CONTINUOUS
Status: DISCONTINUED | OUTPATIENT
Start: 2020-03-25 | End: 2020-03-26

## 2020-03-25 RX ORDER — PROPOFOL 10 MG/ML
VIAL (ML) INTRAVENOUS
Status: DISCONTINUED | OUTPATIENT
Start: 2020-03-25 | End: 2020-03-25

## 2020-03-25 RX ORDER — ALVIMOPAN 12 MG/1
12 CAPSULE ORAL
Status: COMPLETED | OUTPATIENT
Start: 2020-03-25 | End: 2020-03-25

## 2020-03-25 RX ORDER — ACETAMINOPHEN 650 MG/20.3ML
975 LIQUID ORAL
Status: COMPLETED | OUTPATIENT
Start: 2020-03-25 | End: 2020-03-25

## 2020-03-25 RX ORDER — SODIUM CHLORIDE 9 MG/ML
INJECTION, SOLUTION INTRAVENOUS
Status: COMPLETED | OUTPATIENT
Start: 2020-03-25 | End: 2020-03-25

## 2020-03-25 RX ORDER — SODIUM CHLORIDE 0.9 % (FLUSH) 0.9 %
10 SYRINGE (ML) INJECTION
Status: DISCONTINUED | OUTPATIENT
Start: 2020-03-25 | End: 2020-03-25 | Stop reason: HOSPADM

## 2020-03-25 RX ORDER — TRAMADOL HYDROCHLORIDE 50 MG/1
50 TABLET ORAL EVERY 6 HOURS PRN
Status: DISCONTINUED | OUTPATIENT
Start: 2020-03-25 | End: 2020-03-28 | Stop reason: HOSPADM

## 2020-03-25 RX ORDER — SODIUM CHLORIDE 9 MG/ML
INJECTION, SOLUTION INTRAMUSCULAR; INTRAVENOUS; SUBCUTANEOUS
Status: DISCONTINUED | OUTPATIENT
Start: 2020-03-25 | End: 2020-03-25 | Stop reason: HOSPADM

## 2020-03-25 RX ORDER — ONDANSETRON 2 MG/ML
4 INJECTION INTRAMUSCULAR; INTRAVENOUS DAILY PRN
Status: DISCONTINUED | OUTPATIENT
Start: 2020-03-25 | End: 2020-03-25 | Stop reason: HOSPADM

## 2020-03-25 RX ORDER — HEPARIN SODIUM 5000 [USP'U]/ML
5000 INJECTION, SOLUTION INTRAVENOUS; SUBCUTANEOUS EVERY 8 HOURS
Status: COMPLETED | OUTPATIENT
Start: 2020-03-25 | End: 2020-03-25

## 2020-03-25 RX ORDER — FENTANYL CITRATE 50 UG/ML
INJECTION, SOLUTION INTRAMUSCULAR; INTRAVENOUS
Status: DISCONTINUED | OUTPATIENT
Start: 2020-03-25 | End: 2020-03-25

## 2020-03-25 RX ORDER — KETAMINE HCL IN 0.9 % NACL 50 MG/5 ML
SYRINGE (ML) INTRAVENOUS
Status: DISCONTINUED | OUTPATIENT
Start: 2020-03-25 | End: 2020-03-25

## 2020-03-25 RX ORDER — TRIPROLIDINE/PSEUDOEPHEDRINE 2.5MG-60MG
600 TABLET ORAL
Status: COMPLETED | OUTPATIENT
Start: 2020-03-25 | End: 2020-03-25

## 2020-03-25 RX ORDER — OXYCODONE HYDROCHLORIDE 10 MG/1
10 TABLET ORAL EVERY 4 HOURS PRN
Status: DISCONTINUED | OUTPATIENT
Start: 2020-03-25 | End: 2020-03-26

## 2020-03-25 RX ORDER — ONDANSETRON 2 MG/ML
INJECTION INTRAMUSCULAR; INTRAVENOUS
Status: DISCONTINUED | OUTPATIENT
Start: 2020-03-25 | End: 2020-03-25

## 2020-03-25 RX ORDER — LIDOCAINE HYDROCHLORIDE 10 MG/ML
1 INJECTION, SOLUTION EPIDURAL; INFILTRATION; INTRACAUDAL; PERINEURAL ONCE
Status: DISCONTINUED | OUTPATIENT
Start: 2020-03-25 | End: 2020-03-25 | Stop reason: HOSPADM

## 2020-03-25 RX ORDER — ACETAMINOPHEN 10 MG/ML
INJECTION, SOLUTION INTRAVENOUS
Status: DISCONTINUED | OUTPATIENT
Start: 2020-03-25 | End: 2020-03-25

## 2020-03-25 RX ORDER — IBUPROFEN 400 MG/1
800 TABLET ORAL EVERY 8 HOURS
Status: DISCONTINUED | OUTPATIENT
Start: 2020-03-26 | End: 2020-03-28 | Stop reason: HOSPADM

## 2020-03-25 RX ORDER — GABAPENTIN 300 MG/1
300 CAPSULE ORAL
Status: COMPLETED | OUTPATIENT
Start: 2020-03-25 | End: 2020-03-25

## 2020-03-25 RX ORDER — LABETALOL HYDROCHLORIDE 5 MG/ML
INJECTION, SOLUTION INTRAVENOUS
Status: DISCONTINUED | OUTPATIENT
Start: 2020-03-25 | End: 2020-03-25

## 2020-03-25 RX ADMIN — ACETAMINOPHEN 1000 MG: 10 INJECTION, SOLUTION INTRAVENOUS at 09:03

## 2020-03-25 RX ADMIN — FENTANYL CITRATE 25 MCG: 50 INJECTION, SOLUTION INTRAMUSCULAR; INTRAVENOUS at 05:03

## 2020-03-25 RX ADMIN — LEVOTHYROXINE SODIUM 200 MCG: 100 TABLET ORAL at 09:03

## 2020-03-25 RX ADMIN — ROCURONIUM BROMIDE 10 MG: 10 INJECTION, SOLUTION INTRAVENOUS at 08:03

## 2020-03-25 RX ADMIN — ROCURONIUM BROMIDE 20 MG: 10 INJECTION, SOLUTION INTRAVENOUS at 12:03

## 2020-03-25 RX ADMIN — SODIUM CHLORIDE, SODIUM GLUCONATE, SODIUM ACETATE, POTASSIUM CHLORIDE, MAGNESIUM CHLORIDE, SODIUM PHOSPHATE, DIBASIC, AND POTASSIUM PHOSPHATE: .53; .5; .37; .037; .03; .012; .00082 INJECTION, SOLUTION INTRAVENOUS at 09:03

## 2020-03-25 RX ADMIN — METRONIDAZOLE 500 MG: 500 INJECTION, SOLUTION INTRAVENOUS at 08:03

## 2020-03-25 RX ADMIN — FENTANYL CITRATE 100 MCG: 50 INJECTION, SOLUTION INTRAMUSCULAR; INTRAVENOUS at 08:03

## 2020-03-25 RX ADMIN — ROCURONIUM BROMIDE 10 MG: 10 INJECTION, SOLUTION INTRAVENOUS at 11:03

## 2020-03-25 RX ADMIN — PHENYLEPHRINE HYDROCHLORIDE 200 MCG: 10 INJECTION INTRAVENOUS at 08:03

## 2020-03-25 RX ADMIN — ALVIMOPAN 12 MG: 12 CAPSULE ORAL at 06:03

## 2020-03-25 RX ADMIN — LIDOCAINE HYDROCHLORIDE 2 MG: 10 INJECTION, SOLUTION EPIDURAL; INFILTRATION; INTRACAUDAL; PERINEURAL at 07:03

## 2020-03-25 RX ADMIN — FENTANYL CITRATE 25 MCG: 50 INJECTION, SOLUTION INTRAMUSCULAR; INTRAVENOUS at 04:03

## 2020-03-25 RX ADMIN — ROCURONIUM BROMIDE 50 MG: 10 INJECTION, SOLUTION INTRAVENOUS at 08:03

## 2020-03-25 RX ADMIN — Medication 15 MG: at 02:03

## 2020-03-25 RX ADMIN — OXYCODONE HYDROCHLORIDE 10 MG: 10 TABLET ORAL at 09:03

## 2020-03-25 RX ADMIN — ACETAMINOPHEN 1000 MG: 10 INJECTION, SOLUTION INTRAVENOUS at 10:03

## 2020-03-25 RX ADMIN — Medication 15 MG: at 10:03

## 2020-03-25 RX ADMIN — ROCURONIUM BROMIDE 10 MG: 10 INJECTION, SOLUTION INTRAVENOUS at 01:03

## 2020-03-25 RX ADMIN — PHENYLEPHRINE HYDROCHLORIDE 100 MCG: 10 INJECTION INTRAVENOUS at 08:03

## 2020-03-25 RX ADMIN — SODIUM CHLORIDE: 0.9 INJECTION, SOLUTION INTRAVENOUS at 04:03

## 2020-03-25 RX ADMIN — Medication 15 MG: at 09:03

## 2020-03-25 RX ADMIN — Medication 15 MG: at 01:03

## 2020-03-25 RX ADMIN — ROCURONIUM BROMIDE 20 MG: 10 INJECTION, SOLUTION INTRAVENOUS at 09:03

## 2020-03-25 RX ADMIN — DEXAMETHASONE SODIUM PHOSPHATE 8 MG: 4 INJECTION, SOLUTION INTRAMUSCULAR; INTRAVENOUS at 08:03

## 2020-03-25 RX ADMIN — SUGAMMADEX 300 MG: 100 INJECTION, SOLUTION INTRAVENOUS at 02:03

## 2020-03-25 RX ADMIN — Medication 15 MG: at 12:03

## 2020-03-25 RX ADMIN — ACETAMINOPHEN 976.6 MG: 160 SOLUTION ORAL at 06:03

## 2020-03-25 RX ADMIN — GABAPENTIN 300 MG: 300 CAPSULE ORAL at 04:03

## 2020-03-25 RX ADMIN — FENTANYL CITRATE 25 MCG: 50 INJECTION, SOLUTION INTRAMUSCULAR; INTRAVENOUS at 02:03

## 2020-03-25 RX ADMIN — LABETALOL HYDROCHLORIDE 10 MG: 5 INJECTION, SOLUTION INTRAVENOUS at 09:03

## 2020-03-25 RX ADMIN — OXYBUTYNIN CHLORIDE 5 MG: 5 TABLET ORAL at 05:03

## 2020-03-25 RX ADMIN — OXYBUTYNIN CHLORIDE 5 MG: 5 TABLET ORAL at 09:03

## 2020-03-25 RX ADMIN — ONDANSETRON 4 MG: 2 INJECTION INTRAMUSCULAR; INTRAVENOUS at 02:03

## 2020-03-25 RX ADMIN — SODIUM CHLORIDE: 0.9 INJECTION, SOLUTION INTRAVENOUS at 08:03

## 2020-03-25 RX ADMIN — IBUPROFEN 600 MG: 100 SUSPENSION ORAL at 06:03

## 2020-03-25 RX ADMIN — IBUPROFEN 800 MG: 800 INJECTION INTRAVENOUS at 09:03

## 2020-03-25 RX ADMIN — MIDAZOLAM HYDROCHLORIDE 2 MG: 1 INJECTION, SOLUTION INTRAMUSCULAR; INTRAVENOUS at 08:03

## 2020-03-25 RX ADMIN — OXYCODONE HYDROCHLORIDE 5 MG: 5 TABLET ORAL at 04:03

## 2020-03-25 RX ADMIN — Medication 15 MG: at 11:03

## 2020-03-25 RX ADMIN — SODIUM CHLORIDE, SODIUM GLUCONATE, SODIUM ACETATE, POTASSIUM CHLORIDE, MAGNESIUM CHLORIDE, SODIUM PHOSPHATE, DIBASIC, AND POTASSIUM PHOSPHATE: .53; .5; .37; .037; .03; .012; .00082 INJECTION, SOLUTION INTRAVENOUS at 08:03

## 2020-03-25 RX ADMIN — GENTAMICIN SULFATE 421.2 MG: 40 INJECTION, SOLUTION INTRAMUSCULAR; INTRAVENOUS at 08:03

## 2020-03-25 RX ADMIN — ROCURONIUM BROMIDE 10 MG: 10 INJECTION, SOLUTION INTRAVENOUS at 02:03

## 2020-03-25 RX ADMIN — ROCURONIUM BROMIDE 10 MG: 10 INJECTION, SOLUTION INTRAVENOUS at 10:03

## 2020-03-25 RX ADMIN — LIDOCAINE HYDROCHLORIDE 100 MG: 20 INJECTION, SOLUTION INTRAVENOUS at 08:03

## 2020-03-25 RX ADMIN — MUPIROCIN 1 G: 20 OINTMENT TOPICAL at 07:03

## 2020-03-25 RX ADMIN — LIDOCAINE HYDROCHLORIDE 0.02 MG/KG/MIN: 8 INJECTION, SOLUTION INTRAVENOUS at 08:03

## 2020-03-25 RX ADMIN — Medication 35 MG: at 08:03

## 2020-03-25 RX ADMIN — HEPARIN SODIUM 5000 UNITS: 5000 INJECTION, SOLUTION INTRAVENOUS; SUBCUTANEOUS at 07:03

## 2020-03-25 RX ADMIN — PROPOFOL 180 MG: 10 INJECTION, EMULSION INTRAVENOUS at 08:03

## 2020-03-25 RX ADMIN — BUPROPION HYDROCHLORIDE 300 MG: 150 TABLET, FILM COATED, EXTENDED RELEASE ORAL at 09:03

## 2020-03-25 RX ADMIN — PHENYLEPHRINE HYDROCHLORIDE 100 MCG: 10 INJECTION INTRAVENOUS at 09:03

## 2020-03-25 RX ADMIN — GABAPENTIN 300 MG: 300 CAPSULE ORAL at 06:03

## 2020-03-25 RX ADMIN — GABAPENTIN 300 MG: 300 CAPSULE ORAL at 09:03

## 2020-03-25 RX ADMIN — SODIUM CHLORIDE: 0.9 INJECTION, SOLUTION INTRAVENOUS at 07:03

## 2020-03-25 NOTE — NURSING TRANSFER
Nursing Transfer Note      3/25/2020     Transfer To: 1059    Transfer via bed    Transfer with 2L O2, IV pole,     Transported by  [email or web address]    Medicines sent: NA    Chart send with patient: Yes    Notified: daughter    Patient reassessed at: 1800 03/25/2020

## 2020-03-25 NOTE — BRIEF OP NOTE
[facility]  Brief Operative Note    SUMMARY     Surgery Date: 3/25/2020     Surgeon(s) and Role:  Panel 1:     * Silvio Man MD - Primary     * Khanh Padron MD - Resident - Assisting  Panel 2:     * Keron Brady MD - Primary     * Abbie Gallegos MD - Resident - Assisting  Panel 3:     * Claudio Tyson MD - Primary     * Hugo Bullock MD - Resident - Assisting        Pre-Operative Care:  Pre-operative bowel prep Mechanical and PO antibiotics  IV antibiotics given within 1 hour of incision? Yes  Preoperative multimodal pain control? Orfirmev, Calador and TAP    Pre-op Diagnosis:  Malignant neoplasm of sigmoid colon [C18.7]    Operative Care:  Post-op Diagnosis: Post-Op Diagnosis Codes:     * Malignant neoplasm of sigmoid colon [C18.7]    Procedure(s):  Colorectal:  1. COLECTOMY, SIGMOID, LAPAROSCOPIC  2. Laparoscopic splenic flexure mobilization  3. Flexible sigmoidoscopy    By gynecology:  1. HYSTERECTOMY, TOTAL, ABDOMINAL, WITH BILATERAL   SALPINGO-OOPHORECTOMY (N/A)    By urology:  CYSTOSCOPY, WITH URETERAL STENT INSERTION (Bilateral)    Anesthesia: General  Total volume administered 3 L   Total UOP: 600 ml  Anesthesia Start: 0815  Anesthesia Stop: 1450    Technical Procedures Used:   Use of closing instrument setup? Yes  Gown/Glove Change @ time of closing? Yes  Suction tip change at time of closing? Yes  Wound Protector used if open case? Yes    Description of the findings of the procedure:   Wound Class (Clean-contaminated)  1. No evidence of metastatic disease  2. Sigmoid colon mass firmly adherent to uterus.    Complications: No     Estimated Blood Loss: 250 mL           Specimens:   Rectosigmoid with en bloc uterus and bilateral ovaries    Implants:   Implant Name Type Inv. Item Serial No.  Lot No. LRB No. Used   MEMBRANE SEPRAFILM 5 X 6 - [id number]  MEMBRANE SEPRAFILM 5 X 6  App[employer]og [id number]  1       Post-Operative Care:         Disposition: PACU  - hemodynamically stable.           Condition: Good  PT Temp >36 upon leaving the OR? Yes

## 2020-03-25 NOTE — TRANSFER OF CARE
"Anesthesia Transfer of Care Note    Patient: Gail Mckinnon    Procedure(s) Performed: Procedure(s) (LRB):  COLECTOMY, SIGMOID, LAPAROSCOPIC, flex sig, ERAS high (N/A)  HYSTERECTOMY, TOTAL, ABDOMINAL, WITH BILATERAL SALPINGO-OOPHORECTOMY (N/A)  CYSTOSCOPY, WITH URETERAL STENT INSERTION (Bilateral)  MOBILIZATION, SPLENIC FLEXURE    Patient location: PACU    Anesthesia Type: general    Transport from OR: Transported from OR on 6-10 L/min O2 by face mask with adequate spontaneous ventilation    Post pain: adequate analgesia    Post assessment: no apparent anesthetic complications    Post vital signs: stable    Level of consciousness: sedated    Nausea/Vomiting: no nausea/vomiting    Complications: none    Transfer of care protocol was followed      Last vitals:   Visit Vitals  /66 (BP Location: Right arm, Patient Position: Lying)   Pulse 97   Temp 36.7 °C (98.1 °F) (Oral)   Resp 16   Ht 5' 6" (1.676 m)   Wt 119.7 kg (264 lb)   LMP 01/22/2020   SpO2 100%   BMI 42.61 kg/m²     "

## 2020-03-25 NOTE — PROGRESS NOTES
The patient has been examined and the H&P has been reviewed:     I concur with the findings and no changes have occurred since H&P was written.      The risks and benefits of MARCIAL/BSO were discussed with the patient and all questions answered.       Abbie Gallegos MD  Gynecologic Oncology

## 2020-03-25 NOTE — ANESTHESIA PROCEDURE NOTES
Intubation  Performed by: Josiane Mercado DO  Authorized by: Adelaide Roman MD     Intubation:     Induction:  Intravenous    Intubated:  Postinduction    Mask Ventilation:  N/a    Attempts:  2    Attempted By:  Resident anesthesiologist    Method of Intubation:  Video laryngoscopy    Blade:  Galvez 3    Laryngeal View Grade: Grade I - full view of chords      Attempted By (2nd Attempt):  Staff anesthesiologist    Method of Intubation (2nd Attempt):  Video laryngoscopy    Blade (2nd Attempt):  Galvez 3    Laryngeal View Grade (2nd Attempt): Grade I - full view of cords      Difficult Airway Encountered?: Yes      Complications:  None    Airway Device:  Oral endotracheal tube    Airway Device Size:  7.5    Style/Cuff Inflation:  Cuffed    Inflation Amount (mL):  6    Tube secured:  22    Secured at:  The lips    Placement Verified By:  Capnometry    Complicating Factors:  Anterior larynx, short neck, obesity and oropharyngeal edema or fat (goiter)    Findings Post-Intubation:  BS equal bilateral

## 2020-03-25 NOTE — OP NOTE
[facility]  Surgery Department  Operative Note    SUMMARY     Patient: Gail Mckinnon    Medical Record: [id number]    Date of Procedure: 3/25/2020     Surgeon: Surgeon(s) and Role:  Panel 1:     * Silvio Man MD - Primary     * Khanh Padron MD - Resident - Assisting  Panel 2:     * Keron Brady MD - Primary     * Abbie Gallegos MD - Resident - Assisting  Panel 3:     * Claudio Tyson MD - Primary     * Hugo Bullock MD - Resident - Assisting        Pre-Operative Diagnosis: Malignant neoplasm of sigmoid colon [C18.7]    Post-Operative Diagnosis: Post-Op Diagnosis Codes:     * Malignant neoplasm of sigmoid colon [C18.7]    Procedure: Procedure(s) (LRB):  COLECTOMY, SIGMOID, LAPAROSCOPIC, flex sig, ERAS high (N/A)  HYSTERECTOMY, TOTAL, ABDOMINAL, WITH BILATERAL SALPINGO-OOPHORECTOMY (N/A)  CYSTOSCOPY, WITH URETERAL STENT INSERTION (Bilateral)  MOBILIZATION, SPLENIC FLEXURE    EBL: 200 ml    Total Fluid:  See Dr. Man's note    Urine Output:  500 mL    Drains:  See Dr. Man note    Operative History:  This is a patient of Dr. Man's with a large carcinoma involving her sigmoid colon.  This spans approximately 10 cm.  It was adherent to the uterus and left adnexa on her preoperative imaging.    Operative Findings:  The sigmoid colon is plastered to the uterus laterally and posteriorly.  The right tube and ovary are relatively free.  The bladder peritoneum is pulled upward onto the lower uterine segment of the uterus.    The left pelvic sidewall has increased induration and fibrosis.  The left ureter is palpable with a ureteral stent.  The right ureter is also possible with a ureteral stent.      Procedure in Detail:  At the time that I come into the operating room the patient is in [facility].  The abdomen has been opened through a lower abdominal incision.  The bowel is packed out of the pelvis except for the sigmoid colon which is transected.  It  is still adherent to the uterus.  The rectum feels normal but difficult to visualize due to the fixation of the sigmoid colon to the uterus.      I begin on the right-hand side.  The right infundibulopelvic ligament has already been isolated.  It is clamped, cut and tied with 0 Vicryl ties.  The anterior cul-de-sac peritoneum is incised.  The bladder is dissected downward off of the cervix.  The cervix is long. The uterine vessels are skeletonized on the right.    Attention is now directed to the left.  The left infundibulopelvic ligament has been isolated.  The left ureter is palpable but encased within fibrosis along the pelvic sidewall.  The sigmoid colon is adherent along this area.    At this point Dr. Man and I identified the left ureter and placed a vessel loop about it.    The round ligament on the left is now clamped, cut and tied with a 0 Vicryl tie.  The right round ligament is also clamped, cut and tied with a 0 Vicryl tie.    I now begin a sharp and blunt dissection along the left pelvic sidewall finding a plane that allows us to continue to have the ureter laterally.    I am now able to skeletonized the uterine vessels on the left.  These were clamped, cut and suture ligated with a 0 Vicryl suture.  The uterine vessels on the right are now clamped, cut and suture ligated with the 0 Vicryl suture.    An additional paracervical bite is taken.  This is clamped, cut and suture ligated with a 0 Vicryl suture.    It appears at this time that transecting the cervix would allow for the specimen to be better mobilized and allow for Dr. Man to come across the rectum.    The cervix is now incised with the Bovie unit.    This then allows Dr. Man to take down the remainder of the mesentery of the sigmoid colon and to mobilize the rectum.  He then comes across the rectum with a contour stapler.  The specimen is freed and removed. Please refer to his dictation.    I then continue to remove the  remaining cervix in a clamped, cut and suture ligate fashion with a 0 Vicryl suture.  The angles of the vagina are entered and secured with a Pawel stitch of 0 Vicryl suture.  The remaining vagina is incised and the vagina is closed with interrupted 0 Vicryl sutures.    At this point I have completed the hysterectomy and bilateral salpingo-oophorectomy.  And Dr. Man continued with his portion of the operation.       This surgery was necessary at this time during the COVID-19 pandemic due to a near obstructing lesion of the sigmoid colon.  Further delay would result in an colonic obstruction and a delay in beginning treatment for advanced colon cancer.

## 2020-03-25 NOTE — OP NOTE
GAIL SINGH  [id number]  [id number]    OPERATIVE NOTE:    DATE OF OPERATION: 03/25/2020    PREOPERATIVE DIAGNOSIS:  Locally invasive sigmoid colon cancer    POSTOPERATIVE DIAGNOSIS:  Locally invasive sigmoid colon cancer    PROCEDURE PERFORMED:   1. Laparoscopic sigmoid colectomy  2. Laparoscopic mobilization of the splenic flexure  3. Flexible sigmoidoscopy.    4. EN bloc hysterectomy and bilateral salpingo oophorectomy performed by Dr. Brady with GYN Oncology    ATTENDING SURGEON: THOMAS Man MD    RESIDENT/FELLOW SURGEON: Khanh Padron MD    ANESTHESIA: General    ESTIMATED BLOOD LOSS:  250 mL    FINDINGS:  1.  Large sigmoid colon cancer that folded over on itself with multiple attachments to the posterior wall of the uterus as well as attachments laterally on the left side.  Careful ureteral lysis was performed on the left side and was assisted by the placement of ureteral stent from Urology.  2.  Initial flexible sigmoidoscopy after colorectal stapled end-to-end anastomosis demonstrated positive leak at the contour staple line.  This anastomosis was resected and a new end-to-end anastomosis was fashioned.  Negative leak test on repeat staple.    SPECIMENS:   1. Sigmoid colon with hysterectomy and bilateral tubes and ovaries  2. Proximal anastomotic donut  3. Distal anastomotic donut.   4.  Distal anastomotic donut #2    COMPLICATIONS:  None apparent    DRAINS:  None    DISPOSITION: PACU    CONDITION: good    INDICATION FOR PROCEDURE:  Gail Singh is a 49 y.o. female with locally advanced sigmoid colon cancer with involvement of the uterus.  Otherwise negative metastatic workup.  Preoperative ureteral stents were requested from Urology.  Resection was recommended with the assistance of GYN Oncology.  Resection was performed during this time as the tumor was near obstructing, symptomatic, and delay would result in worse patient outcome from potential spread with metastatic disease.   Chemotherapy to delay treatment was not given as the tumor was already necrotic in appearance.  Chemotherapy would increase the risk of local perforation that would require ostomy for treatment as well as further disseminated malignant cells.        DESCRIPTION OF PROCEDURE:    After informed consent was reviewed, the patient was taken to the Operating Room and placed under general anesthesia.  Preoperative antibiotics with ceftriaxone and Flagyl were given.  The patient was placed in lithotomy position. The arms were tucked and a time out was perfomed.    The Urology team performed cystoscopy with bilateral ureteral catheter placement.  York catheter was then sterilely inserted.  The anterior abdominal wall was prepped and draped in the usual sterile fashion.  A 8 cm lower midline incision was made through her prior lower midline incision just above the pubic symphysis.  The skin was incised sharply.  Dissection continued down to the fascia which was incised sharply.  The abdomen was entered sharply.  There were multiple adhesions up to the anterior abdominal wall that were taken down without injury to the underlying bowel.  The sleeve of the GelPort was placed.  Two 5 mm trocars were then placed with 1 on the right side 1 a left side.  The umbilical port site was unable be placed secondary to adhesions.  The top of the GelPort was then placed and the abdomen was insufflated.  Diagnostic laparoscopy was performed.  Omental adhesions were taken down.  The 3rd 5 mm trocar was placed through the umbilicus.  A 4th 5 mm trocar was placed in the right upper quadrant.  The ligament Treitz was identified.  Next to the ligament of Treitz, the inferior mesenteric vein was identified.  The IMV was elevated and the peritoneum was incised.  The retroperitoneum was identified and swept posteriorly out to the abdominal sidewall and to the superior pole of the kidney.  Dissection continued inferiorly around the left colic artery.   The VIVIANE pedicle was then elevated.  The peritoneum was incised in the medial aspect.  The retrorectal space in the hypogastric nerves were identified and swept posteriorly.  The left ureter was identified and swept posteriorly.  High ligation of the inferior mesenteric artery was then performed using the ligature.  After the inferior mesenteric artery was divided.  The left colic artery was divided.  Lateral attachments of the descending colon were taken down with the ligature.  The omentum was taken off of the transverse colon the lesser sac was entered.  There were multiple adhesions to the posterior wall of the stomach.  These were taken down sharply.  Splenic flexure was completely mobilized using the ligature.  Splenic flexure was then rotated medially and attachments to the inferior border the pancreas were divided.  With this, the colon had excellent laxity.  Bilateral tap blocks were performed with 40 mL of dilute Exparel.   The cap of the GelPort was then removed.  The tumor was palpated.  The tumor was very large and densely adherent to the uterus.  Therefore, the sleeve of the GelPort was removed and the incision was extended to approximately 12 cm.  A large wound protector was then placed as well as the bowel for retractor.  At the junction of the descending and sigmoid colon, a window was made in the mesentery. The marginal artery at this level was tested and found to be pulsatile.  The colon was divided with a CANDE 75 mm stapler.  The proximal bowel was packed in the upper abdomen.     Lateral attachments were then taken down using the electrocautery.  The retrorectal space was entered and dissection continued posteriorly.  On the right side, the rectum was able to be palpated and was free of disease.  On the left side, the sigmoid colon had flopped over itself was densely adherent to the posterior wall of the rectum and the mesentery was foreshortened.  We dissected posteriorly as well as laterally on  the right side.  Once freed, Dr. Brady came in to assist with anterior dissection.  The bladder was taken off of the uterus.  The attachments to the right tube and ovary were divided.  The left side had many more adhesions and was densely adherent to the left sidewall.  Tedious lysis of adhesions was performed.  This was all done sharply.  The uterus and left tube and ovary were all elevated EN bloc with the sigmoid colon.  The left ureter had been pulled into the specimen.  The left ureter was palpated with the assistance of the stent.  A vessel loop was placed around the left ureter and ureteral lysis was performed in order to free the ureter.  With the ureter laterally, the left tube and ovary were then completely freed.  He then performed hysterectomy.  With this, the rectum was able to be straightened.  Well below the level of the tumor, a window was made at upper portion of the rectum just behind the rectum and the mesorectum.  The mesorectum was divided with the ligature.  The top of the rectum was divided with a contour stapler and the specimen was removed.   Dr. Bardy then performed completion of the hysterectomy with removal of the cervix.  After removal, the top of the rectum was inspected and found to be hemostatic.  The proximal bowel had excellent laxity.  Proximal to the staple line, the descending colon was circumferentially cleared in the Furness clamp was applied.  The colon was divided with a Furness clamp after a 2 0 nylon pursestring had been placed. The colon was opened and appeared healthy.  A 29 EEA anvil was inserted and the pursestring was tied down and wrapped around the 2nd time for reinforcement.           The EEA stapler was placed into the anal canal and advanced to the top of the rectal stump.  The spike was deployed just posterior to the staple line.  The anvil was connected and the stapler closed.  Care was taken to avoid entrapment of the bladder or ureters.  The stapler was fired  and removed.  On the back table, two circumferential anastmotic donuts were found.  Leak testing was done with a colonoscope.  The anastomosis appeared healthy.  Leak test was positive on the junction of the contour staple line with the EEA staple line on the right side.  Therefore, I scrubbed back into the case.  Dissection continued lower on the rectum.  With the proximal rectum circumferentially cleared.  A 2nd firing of the contour stapler with a green load was used to come across the rectum below the prior anastomosis.  The proximal bowel was then elevated.  The proximal bowel was cleared.  Approximately 3 cm proximal to the anastomosis, Furness clamp was applied.  The pursestring suture did not pass well.  Therefore, the pursestring suture was removed and a pursestring was placed with a 3 0 PDS.  The anvil was placed into the bowel and the PDS was tied down.  A 2nd 3 0 PDS was used for reinforcement.  The EEA stapler was then inserted and effaced to the staple line of the rectal stump.  The spike was deployed just posterior to the staple line.  The anvil was secured to the spike and closed under direct visualization.  The stapler was fired was removed.  Two circumferential anastomotic donuts were seen.  Leak testing was again performed and this time was negative.  The anastomosis was then oversewn with 3-0 vicryl sutures.   Prior to firing the stapler, the descending colon set straight in abdomen without any twisting.  After over-sewing the anastomosis, the omentum was grasped and placed down between the colorectal anastomosis and the vaginal cuff.                All members of the team then changed gowns and gloves and new instruments were used for a clean closure.    the lower midline incision was closed in layers.  Prior to closure, Seprafilm was placed in the upper abdomen to prevent adhesions.  The abdomen was closed with a 0 looped PDS suture with interrupted #1 Vicryl internal retention sutures.    All  incisions were irrigated with saline and hemostasis was noted.  Skin incisions were closed with 4-0 Vicryl in subcuticular fashion and steri-strips were applied.                The patient tolerated the procedure well.  There were no apparent intraoperative complications.  All sponge, needle, and instrument counts were correct x2.  The patient  was extubated and taken to PACU in stable condition.  Bilateral ureteral stents were removed and were intact.                THOMAS Man MD, FACS  Staff Surgeon  Colon & Rectal Surgery

## 2020-03-25 NOTE — PROGRESS NOTES
The patient has been examined and the H&P has been reviewed:     I concur with the findings and no changes have occurred since H&P was written.      The risks and benefits of cystoscopy with bilateral ureteral catheter insertion were discussed with the patient and all questions answered.    MAXWELL Bullock MD  Urology, PGY-2  Pager: [phone]

## 2020-03-25 NOTE — OP NOTE
[facility]  Operative Note    Date: 03/25/2020    Pre-Op Diagnosis: Sigmoid colon cancer involving uterus and left ovary    Patient Active Problem List    Diagnosis Date Noted    Weight loss 03/18/2020    Normocytic anemia 03/18/2020    Hypoalbuminemia 03/18/2020    Hiatal hernia 03/18/2020    Body mass index (BMI) 40.0-44.9, adult 03/18/2020    Renal stones 03/18/2020    Fatty liver 03/17/2020    Malignant neoplasm of sigmoid colon 03/16/2020    FH: ovarian cancer 03/16/2020    Screen for colon cancer 02/12/2020    Depression 04/19/2016    Hypertension 04/19/2016    Hypothyroid 08/24/2012    Multinodular goiter 08/24/2012     Post-Op Diagnosis: same    Procedure(s) Performed:   1. Cystoscopy with bilateral ureteral catheter placement    Specimen(s): none    Staff Surgeon: Claudio Tyson MD    Assistant Surgeon: Hugo Bullock MD    Anesthesia: General endotracheal anesthesia    Indications: Gail Mckinnon is a 49 y.o. female with sigmoid colon cancer involving the uterus and left ovary. She presents for laparoscopic sigmoidectomy and MARCIAL/BSO. Dejuan Man and Parker have requested intra-operative ureteral catheters to allow for early intra-operative identification and repair of any injuries.      Findings: Successful placement of bilateral ureteral catheters without complication    Estimated Blood Loss: min    Drains:   1. Bilateral 5 Fr ureteral catheters  2. 16 Fr lopez catheter    Procedure in Detail: Upon entering the room the patient was under general anesthesia.  The patient was then placed in the dorsal lithotomy position and prepped and draped in the usual sterile fashion. Preoperative antibiotics were administered per the primary surgeon preference.  Timeout was performed.      A 22 Fr cystoscope was inserted into the urethra and formal cystourethroscopy was performed. The urethra was normal.  The right and left ureteral orifices were in the normal anatomic position.  A 5 Fr open-ended ureteral catheter was inserted into the right ureteral orifice and advanced to the level of the right renal pelvis. The cystoscope was then removed leaving the ureteral catheter in place.     The cystoscope was then reinserted alongside the ureteral catheter and a 5 Fr ureteral catheter was advanced to the level of the left renal pelvis. The cystoscope was then removed.      A 16 Fr lopez catheter was inserted and the balloon was filled with 10mL of sterile water. The ureteral catheters were secured in the standard fashion. There were no complications with the procedure and the patient tolerated our procedure well.     The case was then turned over to the primary surgeon.     Hugo Bullock MD

## 2020-03-25 NOTE — ANESTHESIA PROCEDURE NOTES
Arterial    Diagnosis: Colon cancer    Patient location during procedure: done in OR  Procedure start time: 3/25/2020 8:30 AM  Timeout: 3/25/2020 8:29 AM  Procedure end time: 3/25/2020 8:33 AM    Staffing  Authorizing Provider: Adelaide Roman MD  Performing Provider: Josiane Mercado DO    Anesthesiologist was present at the time of the procedure.    Preanesthetic Checklist  Completed: patient identified, site marked, surgical consent, pre-op evaluation, timeout performed, IV checked, risks and benefits discussed, monitors and equipment checked and anesthesia consent givenArterial  Skin Prep: chlorhexidine gluconate and isopropyl alcohol  Local Infiltration: none  Orientation: left  Location: radial  Catheter Size: 20 G  Catheter placement by Anatomical landmarks. Heme positive aspiration all ports.Insertion Attempts: 1  Assessment  Dressing: secured with tape and tegaderm  Patient: Tolerated well

## 2020-03-26 LAB
ANION GAP SERPL CALC-SCNC: 9 MMOL/L (ref 8–16)
BASOPHILS # BLD AUTO: 0.02 K/UL (ref 0–0.2)
BASOPHILS # BLD AUTO: 0.02 K/UL (ref 0–0.2)
BASOPHILS NFR BLD: 0.2 % (ref 0–1.9)
BASOPHILS NFR BLD: 0.2 % (ref 0–1.9)
BUN SERPL-MCNC: 6 MG/DL (ref 6–20)
CALCIUM SERPL-MCNC: 10.3 MG/DL (ref 8.7–10.5)
CHLORIDE SERPL-SCNC: 109 MMOL/L (ref 95–110)
CO2 SERPL-SCNC: 20 MMOL/L (ref 23–29)
CREAT SERPL-MCNC: 0.7 MG/DL (ref 0.5–1.4)
DIFFERENTIAL METHOD: ABNORMAL
DIFFERENTIAL METHOD: ABNORMAL
EOSINOPHIL # BLD AUTO: 0 K/UL (ref 0–0.5)
EOSINOPHIL # BLD AUTO: 0 K/UL (ref 0–0.5)
EOSINOPHIL NFR BLD: 0 % (ref 0–8)
EOSINOPHIL NFR BLD: 0.2 % (ref 0–8)
ERYTHROCYTE [DISTWIDTH] IN BLOOD BY AUTOMATED COUNT: 16.5 % (ref 11.5–14.5)
ERYTHROCYTE [DISTWIDTH] IN BLOOD BY AUTOMATED COUNT: 16.6 % (ref 11.5–14.5)
EST. GFR  (AFRICAN AMERICAN): >60 ML/MIN/1.73 M^2
EST. GFR  (NON AFRICAN AMERICAN): >60 ML/MIN/1.73 M^2
GLUCOSE SERPL-MCNC: 109 MG/DL (ref 70–110)
HCT VFR BLD AUTO: 34.4 % (ref 37–48.5)
HCT VFR BLD AUTO: 39.5 % (ref 37–48.5)
HGB BLD-MCNC: 11.7 G/DL (ref 12–16)
HGB BLD-MCNC: 9.9 G/DL (ref 12–16)
IMM GRANULOCYTES # BLD AUTO: 0.04 K/UL (ref 0–0.04)
IMM GRANULOCYTES # BLD AUTO: 0.05 K/UL (ref 0–0.04)
IMM GRANULOCYTES NFR BLD AUTO: 0.4 % (ref 0–0.5)
IMM GRANULOCYTES NFR BLD AUTO: 0.5 % (ref 0–0.5)
LYMPHOCYTES # BLD AUTO: 1 K/UL (ref 1–4.8)
LYMPHOCYTES # BLD AUTO: 1.4 K/UL (ref 1–4.8)
LYMPHOCYTES NFR BLD: 12.6 % (ref 18–48)
LYMPHOCYTES NFR BLD: 9.5 % (ref 18–48)
MAGNESIUM SERPL-MCNC: 2 MG/DL (ref 1.6–2.6)
MCH RBC QN AUTO: 24.3 PG (ref 27–31)
MCH RBC QN AUTO: 24.5 PG (ref 27–31)
MCHC RBC AUTO-ENTMCNC: 28.8 G/DL (ref 32–36)
MCHC RBC AUTO-ENTMCNC: 29.6 G/DL (ref 32–36)
MCV RBC AUTO: 83 FL (ref 82–98)
MCV RBC AUTO: 85 FL (ref 82–98)
MONOCYTES # BLD AUTO: 0.9 K/UL (ref 0.3–1)
MONOCYTES # BLD AUTO: 1.1 K/UL (ref 0.3–1)
MONOCYTES NFR BLD: 8.3 % (ref 4–15)
MONOCYTES NFR BLD: 9.4 % (ref 4–15)
NEUTROPHILS # BLD AUTO: 8.5 K/UL (ref 1.8–7.7)
NEUTROPHILS # BLD AUTO: 8.8 K/UL (ref 1.8–7.7)
NEUTROPHILS NFR BLD: 77.4 % (ref 38–73)
NEUTROPHILS NFR BLD: 81.3 % (ref 38–73)
NRBC BLD-RTO: 0 /100 WBC
NRBC BLD-RTO: 0 /100 WBC
PHOSPHATE SERPL-MCNC: 2.5 MG/DL (ref 2.7–4.5)
PLATELET # BLD AUTO: 466 K/UL (ref 150–350)
PLATELET # BLD AUTO: 593 K/UL (ref 150–350)
PMV BLD AUTO: 10 FL (ref 9.2–12.9)
PMV BLD AUTO: 10.3 FL (ref 9.2–12.9)
POTASSIUM SERPL-SCNC: 4.3 MMOL/L (ref 3.5–5.1)
RBC # BLD AUTO: 4.07 M/UL (ref 4–5.4)
RBC # BLD AUTO: 4.77 M/UL (ref 4–5.4)
SODIUM SERPL-SCNC: 138 MMOL/L (ref 136–145)
WBC # BLD AUTO: 10.4 K/UL (ref 3.9–12.7)
WBC # BLD AUTO: 11.39 K/UL (ref 3.9–12.7)

## 2020-03-26 PROCEDURE — 97161 PT EVAL LOW COMPLEX 20 MIN: CPT

## 2020-03-26 PROCEDURE — 36415 COLL VENOUS BLD VENIPUNCTURE: CPT

## 2020-03-26 PROCEDURE — 99024 PR POST-OP FOLLOW-UP VISIT: ICD-10-PCS | Mod: ,,, | Performed by: OBSTETRICS & GYNECOLOGY

## 2020-03-26 PROCEDURE — 97530 THERAPEUTIC ACTIVITIES: CPT

## 2020-03-26 PROCEDURE — 25000003 PHARM REV CODE 250: Performed by: NURSE PRACTITIONER

## 2020-03-26 PROCEDURE — 83735 ASSAY OF MAGNESIUM: CPT

## 2020-03-26 PROCEDURE — 25000003 PHARM REV CODE 250: Performed by: STUDENT IN AN ORGANIZED HEALTH CARE EDUCATION/TRAINING PROGRAM

## 2020-03-26 PROCEDURE — 84100 ASSAY OF PHOSPHORUS: CPT

## 2020-03-26 PROCEDURE — 97535 SELF CARE MNGMENT TRAINING: CPT

## 2020-03-26 PROCEDURE — 20600001 HC STEP DOWN PRIVATE ROOM

## 2020-03-26 PROCEDURE — 99024 POSTOP FOLLOW-UP VISIT: CPT | Mod: ,,, | Performed by: OBSTETRICS & GYNECOLOGY

## 2020-03-26 PROCEDURE — 85025 COMPLETE CBC W/AUTO DIFF WBC: CPT

## 2020-03-26 PROCEDURE — 80048 BASIC METABOLIC PNL TOTAL CA: CPT

## 2020-03-26 PROCEDURE — 97165 OT EVAL LOW COMPLEX 30 MIN: CPT

## 2020-03-26 PROCEDURE — 63600175 PHARM REV CODE 636 W HCPCS: Performed by: STUDENT IN AN ORGANIZED HEALTH CARE EDUCATION/TRAINING PROGRAM

## 2020-03-26 RX ORDER — ALVIMOPAN 12 MG/1
12 CAPSULE ORAL 2 TIMES DAILY
Status: DISCONTINUED | OUTPATIENT
Start: 2020-03-26 | End: 2020-03-27

## 2020-03-26 RX ORDER — ENOXAPARIN SODIUM 100 MG/ML
40 INJECTION SUBCUTANEOUS EVERY 12 HOURS
Status: DISCONTINUED | OUTPATIENT
Start: 2020-03-26 | End: 2020-03-26

## 2020-03-26 RX ORDER — OXYCODONE HYDROCHLORIDE 10 MG/1
10 TABLET ORAL EVERY 4 HOURS PRN
Status: DISCONTINUED | OUTPATIENT
Start: 2020-03-26 | End: 2020-03-28 | Stop reason: HOSPADM

## 2020-03-26 RX ADMIN — OXYBUTYNIN CHLORIDE 5 MG: 5 TABLET ORAL at 08:03

## 2020-03-26 RX ADMIN — ALVIMOPAN 12 MG: 12 CAPSULE ORAL at 08:03

## 2020-03-26 RX ADMIN — GABAPENTIN 300 MG: 300 CAPSULE ORAL at 08:03

## 2020-03-26 RX ADMIN — CYCLOBENZAPRINE HYDROCHLORIDE 10 MG: 5 TABLET, FILM COATED ORAL at 09:03

## 2020-03-26 RX ADMIN — ACETAMINOPHEN 1000 MG: 10 INJECTION, SOLUTION INTRAVENOUS at 01:03

## 2020-03-26 RX ADMIN — OXYBUTYNIN CHLORIDE 5 MG: 5 TABLET ORAL at 02:03

## 2020-03-26 RX ADMIN — BUPROPION HYDROCHLORIDE 300 MG: 150 TABLET, FILM COATED, EXTENDED RELEASE ORAL at 08:03

## 2020-03-26 RX ADMIN — OXYCODONE HYDROCHLORIDE 10 MG: 10 TABLET ORAL at 02:03

## 2020-03-26 RX ADMIN — IBUPROFEN 800 MG: 400 TABLET, FILM COATED ORAL at 09:03

## 2020-03-26 RX ADMIN — OXYCODONE HYDROCHLORIDE 15 MG: 10 TABLET ORAL at 09:03

## 2020-03-26 RX ADMIN — ACETAMINOPHEN 1000 MG: 10 INJECTION, SOLUTION INTRAVENOUS at 06:03

## 2020-03-26 RX ADMIN — OXYCODONE HYDROCHLORIDE 15 MG: 10 TABLET ORAL at 08:03

## 2020-03-26 RX ADMIN — MUPIROCIN: 20 OINTMENT TOPICAL at 09:03

## 2020-03-26 RX ADMIN — IBUPROFEN 800 MG: 800 INJECTION INTRAVENOUS at 07:03

## 2020-03-26 RX ADMIN — ACETAMINOPHEN 1000 MG: 500 TABLET ORAL at 09:03

## 2020-03-26 RX ADMIN — LEVOTHYROXINE SODIUM 200 MCG: 100 TABLET ORAL at 08:03

## 2020-03-26 RX ADMIN — IBUPROFEN 800 MG: 800 INJECTION INTRAVENOUS at 02:03

## 2020-03-26 RX ADMIN — GABAPENTIN 300 MG: 300 CAPSULE ORAL at 02:03

## 2020-03-26 RX ADMIN — OXYCODONE HYDROCHLORIDE 15 MG: 10 TABLET ORAL at 06:03

## 2020-03-26 RX ADMIN — OXYCODONE HYDROCHLORIDE 10 MG: 10 TABLET ORAL at 01:03

## 2020-03-26 NOTE — PLAN OF CARE
Problem: Occupational Therapy Goal  Goal: Occupational Therapy Goal  Outcome: Met     Eval/discharge as patient at baseline  Tavia Mcwilliams OT  3/26/2020

## 2020-03-26 NOTE — PROGRESS NOTES
[facility]  Colorectal Surgery  Progress Note    Patient Name: Gail Mckinnon  MRN: [id number]  Admission Date: 3/25/2020  Hospital Length of Stay: 1 days  Attending Physician: Silvio Man MD    Subjective:     Interval History: Complains of uncontrolled pain especially in LLQ.  Tachycardia up to 121.  Blood pressure 158 systolic.  Denies nausea/vomiting  No flatus nor stool yet  Not yet up out of bed.    Post-Op Info:  Procedure(s) (LRB):  COLECTOMY, SIGMOID, LAPAROSCOPIC, flex sig, ERAS high (N/A)  HYSTERECTOMY, TOTAL, ABDOMINAL, WITH BILATERAL SALPINGO-OOPHORECTOMY (N/A)  CYSTOSCOPY, WITH URETERAL STENT INSERTION (Bilateral)  MOBILIZATION, SPLENIC FLEXURE   1 Day Post-Op      Medications:  Continuous Infusions:  Scheduled Meds:   acetaminophen  1,000 mg Intravenous Q8H    Followed by    acetaminophen  1,000 mg Oral Q8H    alvimopan  12 mg Oral BID    buPROPion  300 mg Oral Daily    enoxaparin  40 mg Subcutaneous Daily    gabapentin  300 mg Oral TID    ibuprofen  800 mg Intravenous Q8H    Followed by    ibuprofen  800 mg Oral Q8H    levothyroxine  200 mcg Oral Daily    mupirocin   Nasal BID    oxybutynin  5 mg Oral TID     PRN Meds:   cyclobenzaprine    ondansetron    oxyCODONE    oxyCODONE    sodium chloride 0.9%    traMADoL        Objective:     Vital Signs (Most Recent):  Temp: 99 °F (37.2 °C) (03/25/20 2344)  Pulse: (!) 112 (03/26/20 0300)  Resp: 17 (03/25/20 2000)  BP: (!) 158/88 (03/25/20 2344)  SpO2: (!) 93 % (03/25/20 2344) Vital Signs (24h Range):  Temp:  [97.5 °F (36.4 °C)-99 °F (37.2 °C)] 99 °F (37.2 °C)  Pulse:  [] 112  Resp:  [12-21] 17  SpO2:  [92 %-100 %] 93 %  BP: (110-164)/(57-88) 158/88  Arterial Line BP: (108-153)/(58-82) 153/82     Intake/Output - Last 3 Shifts       03/24 0700 - 03/25 0659 03/25 0700 - 03/26 0659    I.V. (mL/kg)  2301.3 (19.2)    Total Intake(mL/kg)  2301.3 (19.2)    Urine (mL/kg/hr)  1630 (0.6)    Stool  0    Blood  250     Total Output  1880    Net  +421.3          Stool Occurrence  0 x          Physical Exam   Constitutional: She is oriented to person, place, and time. She appears well-nourished. No distress.   HENT:   Head: Normocephalic.   Eyes: Conjunctivae are normal.   Neck: No tracheal deviation present.   Cardiovascular: Regular rhythm.   Pulmonary/Chest: Effort normal. No respiratory distress.   Abdominal:   Soft, appropriately tender, rotund, non-distended  Incision dressings clean, dry, intact   Neurological: She is alert and oriented to person, place, and time.       Significant Labs:  BMP (Last 3 Results): No results for input(s): GLU, NA, K, CL, CO2, BUN, CREATININE, CALCIUM, MG in the last 168 hours.  CBC (Last 3 Results):   Recent Labs   Lab 03/26/20  0305   WBC 11.39   RBC 4.77   HGB 11.7*   HCT 39.5   *   MCV 83   MCH 24.5*   MCHC 29.6*       Significant Diagnostics:  None    Assessment/Plan:     * Malignant neoplasm of sigmoid colon  POD#1 s/p lap assisted sigmoid colectomy with en bloc hysterectomy and BSO.    - ERAS multimodal pain control; increase oxycodone to 10 mg and 15 mg available  - Advance to regular diet, go slow  - Saline lock IV  - Discontinue lopez catheter; f/u void  - Ambulate; incentive spirometer.    Hypothyroid  Continue home levothyroxine          Khanh Padron MD  Colorectal Surgery  [facility]

## 2020-03-26 NOTE — PLAN OF CARE
Problem: Physical Therapy Goal  Goal: Physical Therapy Goal  Description  Goals to be met by: 2020     Patient will increase functional independence with mobility by performin. Supine to sit with Modified [place]  2. Sit to supine with Modified [place]  3. Sit to stand transfer with Modified [place]  4. Gait  x 200 feet with Supervision .   5. Ascend/descend 2 stair with Supervision   Outcome: Ongoing, Progressing   Eval completed and POC established    Timmy Renee PT,DPT  3/26/2020

## 2020-03-26 NOTE — SUBJECTIVE & OBJECTIVE
Subjective:     Interval History: Complains of uncontrolled pain especially in LLQ.  Tachycardia up to 121.  Blood pressure 158 systolic.  Denies nausea/vomiting  No flatus nor stool yet  Not yet up out of bed.    Post-Op Info:  Procedure(s) (LRB):  COLECTOMY, SIGMOID, LAPAROSCOPIC, flex sig, ERAS high (N/A)  HYSTERECTOMY, TOTAL, ABDOMINAL, WITH BILATERAL SALPINGO-OOPHORECTOMY (N/A)  CYSTOSCOPY, WITH URETERAL STENT INSERTION (Bilateral)  MOBILIZATION, SPLENIC FLEXURE   1 Day Post-Op      Medications:  Continuous Infusions:  Scheduled Meds:   acetaminophen  1,000 mg Intravenous Q8H    Followed by    acetaminophen  1,000 mg Oral Q8H    alvimopan  12 mg Oral BID    buPROPion  300 mg Oral Daily    enoxaparin  40 mg Subcutaneous Daily    gabapentin  300 mg Oral TID    ibuprofen  800 mg Intravenous Q8H    Followed by    ibuprofen  800 mg Oral Q8H    levothyroxine  200 mcg Oral Daily    mupirocin   Nasal BID    oxybutynin  5 mg Oral TID     PRN Meds:   cyclobenzaprine    ondansetron    oxyCODONE    oxyCODONE    sodium chloride 0.9%    traMADoL        Objective:     Vital Signs (Most Recent):  Temp: 99 °F (37.2 °C) (03/25/20 2344)  Pulse: (!) 112 (03/26/20 0300)  Resp: 17 (03/25/20 2000)  BP: (!) 158/88 (03/25/20 2344)  SpO2: (!) 93 % (03/25/20 2344) Vital Signs (24h Range):  Temp:  [97.5 °F (36.4 °C)-99 °F (37.2 °C)] 99 °F (37.2 °C)  Pulse:  [] 112  Resp:  [12-21] 17  SpO2:  [92 %-100 %] 93 %  BP: (110-164)/(57-88) 158/88  Arterial Line BP: (108-153)/(58-82) 153/82     Intake/Output - Last 3 Shifts       03/24 0700 - 03/25 0659 03/25 0700 - 03/26 0659    I.V. (mL/kg)  2301.3 (19.2)    Total Intake(mL/kg)  2301.3 (19.2)    Urine (mL/kg/hr)  1630 (0.6)    Stool  0    Blood  250    Total Output  1880    Net  +421.3          Stool Occurrence  0 x          Physical Exam   Constitutional: She is oriented to person, place, and time. She appears well-nourished. No distress.   HENT:   Head: Normocephalic.    Eyes: Conjunctivae are normal.   Neck: No tracheal deviation present.   Cardiovascular: Regular rhythm.   Pulmonary/Chest: Effort normal. No respiratory distress.   Abdominal:   Soft, appropriately tender, rotund, non-distended  Incision dressings clean, dry, intact   Neurological: She is alert and oriented to person, place, and time.       Significant Labs:  BMP (Last 3 Results): No results for input(s): GLU, NA, K, CL, CO2, BUN, CREATININE, CALCIUM, MG in the last 168 hours.  CBC (Last 3 Results):   Recent Labs   Lab 03/26/20  0305   WBC 11.39   RBC 4.77   HGB 11.7*   HCT 39.5   *   MCV 83   MCH 24.5*   MCHC 29.6*       Significant Diagnostics:  None

## 2020-03-26 NOTE — CARE UPDATE
BP down to 87/49.  Asymptomatic.  Recheck of hemoglobin 9.9 from 11.7.  HR 87-98.  700 ml of urine since 0700.  Lovenox held.  Pain improved from this mornin and abdomen non-distended with appropriate incisional tenderness.    Will recheck hemoglobin in the morning.

## 2020-03-26 NOTE — PLAN OF CARE
Patient is a 49 year old female admitted from home 3/25/2020 and underwent Cystoscopy with Bilateral Ureteral Catheter Placement, Laparoscopic Sigmoid Colectomy, MARCIAL/BSO.  Patient is expected to discharge home with no needs +/- 3/30/2020.    Patient lives in a one story home with her 2 adult children and mother lives in her area.  Patients family will drive her home and obtain anything she needs after discharge.  Will continue to follow for needs.    PCP  Tima Mederos MD  [address] / [facility] [address]  [phone]      [address], [place] [address]   [facility] [address]  Phone: [phone] Fax: [phone]      [place] [id number] - [place] - [address]   [address]   [place] [address]  Phone: [phone] Fax: [phone]         03/26/20 1645   Discharge Assessment   Assessment Type Discharge Planning Assessment   Expected Length of Stay (days) 5   Prior to hospitilization cognitive status: Alert/Oriented   Prior to hospitalization functional status: Independent   Current cognitive status: Alert/Oriented   Current Functional Status: Independent;Needs Assistance   Lives With child(dalia), adult   Able to Return to Prior Arrangements yes   Is patient able to care for self after discharge? Yes   Who are your caregiver(s) and their phone number(s)? family   Patient's perception of discharge disposition home health   Equipment Currently Used at Home none   Do you have any problems affording any of your prescribed medications? No   Is the patient taking medications as prescribed? yes   Does the patient have transportation home? Yes   Transportation Anticipated family or friend will provide   Discharge Plan A Home with family   Discharge Plan B Home with family;Home Health   DME Needed Upon Discharge  none   Patient/Family in Agreement with Plan yes

## 2020-03-26 NOTE — NURSING
D, Vito updated on VS, aware of repeat CBC resukts, patient reasting comfortably with no complaints

## 2020-03-26 NOTE — ANESTHESIA POSTPROCEDURE EVALUATION
Anesthesia Post Evaluation    Patient: Gail Mckinnon    Procedure(s) Performed: Procedure(s) (LRB):  COLECTOMY, SIGMOID, LAPAROSCOPIC, flex sig, ERAS high (N/A)  HYSTERECTOMY, TOTAL, ABDOMINAL, WITH BILATERAL SALPINGO-OOPHORECTOMY (N/A)  CYSTOSCOPY, WITH URETERAL STENT INSERTION (Bilateral)  MOBILIZATION, SPLENIC FLEXURE    Final Anesthesia Type: general    Patient location during evaluation: PACU  Patient participation: Yes- Able to Participate  Level of consciousness: awake and alert  Post-procedure vital signs: reviewed and stable  Pain management: adequate  Airway patency: patent    PONV status at discharge: No PONV  Anesthetic complications: no      Cardiovascular status: blood pressure returned to baseline  Respiratory status: unassisted, spontaneous ventilation and room air  Hydration status: euvolemic  Follow-up not needed.          Vitals Value Taken Time   /57 3/26/2020  8:31 AM   Temp 36.9 °C (98.5 °F) 3/26/2020  8:31 AM   Pulse 95 3/26/2020 11:35 AM   Resp 18 3/26/2020  8:31 AM   SpO2 96 % 3/26/2020  6:16 AM         Event Time     Out of Recovery 16:15:00          Pain/Aggie Score: Pain Rating Prior to Med Admin: 7 (3/26/2020  9:47 AM)  Aggie Score: 9 (3/25/2020  6:00 PM)

## 2020-03-26 NOTE — PT/OT/SLP EVAL
"Occupational Therapy   Evaluation and Discharge Note    Name: Gail Mckinnon  MRN: [id number]  Admitting Diagnosis:  Malignant neoplasm of sigmoid colon 1 Day Post-Op    Recommendations:     Discharge Recommendations: home  Discharge Equipment Recommendations:  none  Barriers to discharge:  None    Assessment:     Gail Mckinnon is a 49 y.o. female with a medical diagnosis of Malignant neoplasm of sigmoid colon. At this time, patient is functioning at their prior level of function and does not require further acute OT services.     Plan:     During this hospitalization, patient does not require further acute OT services.  Please re-consult if situation changes.    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  Patient found supine with   upon OT entry to room for co-evaluation of OT/PT.    General Precautions: Standard,     Orthopedic Precautions:N/A   Braces: N/A     Occupational Performance:    Bed Mobility:    · Patient completed Rolling/Turning to Left with independence  · Patient completed Rolling/Turning to Right with independence  · Patient completed Supine to Sit with independence    Functional Mobility/Transfers:  · Patient completed Sit <> Stand Transfer with independence  with  no assistive device   · Patient " completed Bed <> Chair Transfer using Step Transfer technique with independence with no assistive device  · Patient completed Toilet Transfer Step Transfer technique with independence with  no AD  · Functional Mobility: ambulated in hallway without device     Activities of Daily Living:  · Grooming: independence standing at sink for brushing teeth, washing face, washing hands  · Upper Body Dressing: independence don gown   · Toileting: independence     Cognitive/Visual Perceptual:  Cognitive/Psychosocial Skills:     -       Oriented to: Person, Place, Time and Situation     Physical Exam:  Upper Extremity Range of Motion:     -       Right Upper Extremity: WNL  -       Left Upper Extremity: WNL  Upper Extremity Strength:    -       Right Upper Extremity: WNL  -       Left Upper Extremity: WNL    AMPAC 6 Click ADL:  AMPAC Total Score: 24    Treatment & Education:  -Educated on OT and plan of care   Education:    Patient left up in chair with call button in reach    GOALS:   Multidisciplinary Problems     Occupational Therapy Goals     Not on file          Multidisciplinary Problems (Resolved)        Problem: Occupational Therapy Goal    Goal Priority Disciplines Outcome Interventions   Occupational Therapy Goal   (Resolved)     OT, PT/OT Met                    History:     Past Medical History:   Diagnosis Date    Anxiety     Depression     FH: ovarian cancer 3/16/2020    Hyperthyroidism     Hypothyroid     Kidney calculi     Malignant neoplasm of sigmoid colon 3/16/2020    Menorrhagia     Multinodular goiter 2012    Palpitation     Venous insufficiency        Past Surgical History:   Procedure Laterality Date     SECTION, CLASSIC      x3    COLONOSCOPY N/A 2020    Procedure: COLONOSCOPY;  Surgeon: Shane Parker MD;  [facility];  Service: Endoscopy;  Laterality: N/A;    CYSTOSCOPY W/ URETERAL STENT PLACEMENT Bilateral 3/25/2020    Procedure: CYSTOSCOPY, WITH URETERAL  STENT INSERTION;  Surgeon: Claudio Tyson MD;  [facility];  Service: Urology;  Laterality: Bilateral;    LAPAROSCOPIC SIGMOIDECTOMY N/A 3/25/2020    Procedure: COLECTOMY, SIGMOID, LAPAROSCOPIC, flex sig, ERAS high;  Surgeon: Silvio Man MD;  [facility];  Service: Colon and Rectal;  Laterality: N/A;    MOBILIZATION OF SPLENIC FLEXURE  3/25/2020    Procedure: MOBILIZATION, SPLENIC FLEXURE;  Surgeon: Silvio Man MD;  [facility];  Service: Colon and Rectal;;    tonsillectomy      TOTAL ABDOMINAL HYSTERECTOMY W/ BILATERAL SALPINGOOPHORECTOMY N/A 3/25/2020    Procedure: HYSTERECTOMY, TOTAL, ABDOMINAL, WITH BILATERAL SALPINGO-OOPHORECTOMY;  Surgeon: Keron Brady MD;  Location: [facility] OR [address] [place] [address];  Service: Oncology;  Laterality: N/A;       Time Tracking:     OT Date of Treatment: 03/26/20  OT Start Time: 0955  OT Stop Time: 1012  OT Total Time (min): 17 min    Billable Minutes:Evaluation 9  Self Care/Home Management 8    Tavia Mcwilliams, OT  3/26/2020

## 2020-03-26 NOTE — PT/OT/SLP EVAL
Physical Therapy Evaluation    Patient Name:  Gail Mckinnon   MRN:  [id number]    Recommendations:     Discharge Recommendations:  home   Discharge Equipment Recommendations: none   Barriers to discharge: decreased functional mobility    Assessment:     Gail Mckinnon is a 49 y.o. female admitted with a medical diagnosis of Malignant neoplasm of sigmoid colon.  She presents with the following impairments/functional limitations:  weakness, gait instability, impaired balance, impaired endurance, impaired self care skills, impaired functional mobilty, pain, impaired skin.  Pt tolerated session c c/o abdominal pain and dizziness.  Performed mobility c S-CGA.  Pt able to amb short household distance without AD and demo decreased gait speed, mild FFP, flat foot contact and narrow MAREK.  Pt safe to amb c assistance of 1x person. Pt would benefit from continued skilled acute PT 4x/wk to improve functional mobility.  Anticipating pt will be able to return home c no additional PT services needed.      Rehab Prognosis: Good; patient would benefit from acute skilled PT services to address these deficits and reach maximum level of function.    Recent Surgery: Procedure(s) (LRB):  COLECTOMY, SIGMOID, LAPAROSCOPIC, flex sig, ERAS high (N/A)  HYSTERECTOMY, TOTAL, ABDOMINAL, WITH BILATERAL SALPINGO-OOPHORECTOMY (N/A)  CYSTOSCOPY, WITH URETERAL STENT INSERTION (Bilateral)  MOBILIZATION, SPLENIC FLEXURE 1 Day Post-Op    Plan:     During this hospitalization, patient to be seen 4 x/week to address the identified rehab impairments via gait training, therapeutic exercises, therapeutic activities and progress toward the following goals:    · Plan of Care Expires:  04/20/20    Subjective     Chief Complaint: pain and dizziness  Patient/Family Comments/goals: to return home  Pain/Comfort:  · Pain Rating 1: (reports abdominal pain)    Patients cultural, spiritual, Latter day conflicts given the current situation: no    Living  Environment:  Pt lives c adult children (26/22yo) in [facility] c 2STE 0HR.  PTA driving, working (desk job) and reports no falls.  Prior to admission, patients level of function was independent.  Equipment used at home: none.  DME owned (not currently used): none.  Upon discharge, patient will have assistance from family.    Objective:     Communicated with RN and OT prior to session.  Patient found HOB elevated with peripheral IV  upon PT entry to room.    General Precautions: Standard, fall   Orthopedic Precautions:N/A   Braces: N/A     Exams:  · Cognitive Exam:  Patient is oriented to Person, Place, Time and Situation  · RLE ROM: WFL  · RLE Strength: WFL  · LLE ROM: WFL  · LLE Strength: WFL    Functional Mobility:  · Bed Mobility:     · Rolling Left:  supervision  · Scooting: supervision  · Supine to Sit: supervision  · Transfers:     · Sit to Stand:  stand by assistance with no AD  · Gait: 60ft c no AD CGA   · demo decreased gait speed, mild FFP, flat foot contact and narrow MAREK  · Balance: sitting (S); standing (SBA-CGA)      Therapeutic Activities and Exercises:  Pt educated on: PT role/POC; safety c mobility; benefits of OOB activities; performing therex; d/c recs - v/u  -sat EOB x4mins  -sit<>Stand from bed 1x, toilet 1, bedside chair 3x  -therex (LAQ, hip flex, AP)  -repositioned for comfort    AM-PAC 6 CLICK MOBILITY  Total Score:18     Patient left up in chair with all lines intact, call button in reach and RN notified.    GOALS:   Multidisciplinary Problems     Physical Therapy Goals        Problem: Physical Therapy Goal    Goal Priority Disciplines Outcome Goal Variances Interventions   Physical Therapy Goal     PT, PT/OT Ongoing, Progressing     Description:  Goals to be met by: 2020     Patient will increase functional independence with mobility by performin. Supine to sit with Modified [place]  2. Sit to supine with Modified [place]  3. Sit to stand transfer with Modified  [place]  4. Gait  x 200 feet with Supervision .   5. Ascend/descend 2 stair with Supervision                    History:     Past Medical History:   Diagnosis Date    Anxiety     Depression     FH: ovarian cancer 3/16/2020    Hyperthyroidism     Hypothyroid     Kidney calculi     Malignant neoplasm of sigmoid colon 3/16/2020    Menorrhagia     Multinodular goiter 2012    Palpitation     Venous insufficiency        Past Surgical History:   Procedure Laterality Date     SECTION, CLASSIC      x3    COLONOSCOPY N/A 2020    Procedure: COLONOSCOPY;  Surgeon: Shane Parker MD;  Location: [facility];  Service: Endoscopy;  Laterality: N/A;    CYSTOSCOPY W/ URETERAL STENT PLACEMENT Bilateral 3/25/2020    Procedure: CYSTOSCOPY, WITH URETERAL STENT INSERTION;  Surgeon: Claudio Tyson MD;  Location: [facility] OR [address] [place] [address];  Service: Urology;  Laterality: Bilateral;    LAPAROSCOPIC SIGMOIDECTOMY N/A 3/25/2020    Procedure: COLECTOMY, SIGMOID, LAPAROSCOPIC, flex sig, ERAS high;  Surgeon: Silvio Man MD;  [facility];  Service: Colon and Rectal;  Laterality: N/A;    MOBILIZATION OF SPLENIC FLEXURE  3/25/2020    Procedure: MOBILIZATION, SPLENIC FLEXURE;  Surgeon: Silvio Man MD;  [facility];  Service: Colon and Rectal;;    tonsillectomy      TOTAL ABDOMINAL HYSTERECTOMY W/ BILATERAL SALPINGOOPHORECTOMY N/A 3/25/2020    Procedure: HYSTERECTOMY, TOTAL, ABDOMINAL, WITH BILATERAL SALPINGO-OOPHORECTOMY;  Surgeon: Keron Brady MD;  Location: [facility] OR [address] [place] [address];  Service: Oncology;  Laterality: N/A;       Time Tracking:     PT Received On: 20  PT Start Time: 955     PT Stop Time: 1012  PT Total Time (min): 17 min     Billable Minutes: Evaluation 8 min and Therapeutic Activity 8 min      Timmy Renee, PT  2020

## 2020-03-26 NOTE — ASSESSMENT & PLAN NOTE
POD#1  - Pain management per primary team  - H/H stable, 11.7/39.5 this AM  - York to be removed this AM  - Advanced to regular diet today  - SCDs and lovenox for DVT ppx    We will sign off. Please page GYN oncology with any questions.

## 2020-03-26 NOTE — ASSESSMENT & PLAN NOTE
POD#1 s/p lap assisted sigmoid colectomy with en bloc hysterectomy and BSO.    - ERAS multimodal pain control; increase oxycodone to 10 mg and 15 mg available  - Advance to regular diet, go slow  - Saline lock IV  - Discontinue lopez catheter; f/u void  - Ambulate; incentive spirometer.

## 2020-03-26 NOTE — HPI
Gail Provost Mckinnon is a 49 y.o. who is now POD#1 s/p sigmoid colectomy, MARCIAL/BSO, and cysto with ureteral stent placement for the treatment of colon cancer. Colorectal surgery team is managing.

## 2020-03-26 NOTE — PROGRESS NOTES
[facility]  Gynecologic Oncology  Progress Note      Patient Name: Gail Mckinnon  MRN: [id number]  Admission Date: 3/25/2020  Hospital Length of Stay: 1 days  Attending Provider: Silvio Man MD  Primary Care Provider: Tima Mederos MD  Principal Problem: Malignant neoplasm of sigmoid colon    Follow-up For: Procedure(s) (LRB):  COLECTOMY, SIGMOID, LAPAROSCOPIC, flex sig, ERAS high (N/A)  HYSTERECTOMY, TOTAL, ABDOMINAL, WITH BILATERAL SALPINGO-OOPHORECTOMY (N/A)  CYSTOSCOPY, WITH URETERAL STENT INSERTION (Bilateral)  MOBILIZATION, SPLENIC FLEXURE  Post-Operative Day: 1 Day Post-Op  Subjective:      History of Present Illness:  Gail Mckinnon is a 49 y.o. who is now POD#1 s/p sigmoid colectomy, MARCIAL/BSO, and cysto with ureteral stent placement for the treatment of colon cancer. Colorectal surgery team is managing.    Interval History:   NAEON. Patient states that her abdominal pain was not well-controlled overnight with IV tylenol, IV ibuprofen, and oxy IR 10mg x 2. She is tolerating water without nausea/vomiting.     Scheduled Meds:   acetaminophen  1,000 mg Intravenous Q8H    Followed by    acetaminophen  1,000 mg Oral Q8H    alvimopan  12 mg Oral BID    buPROPion  300 mg Oral Daily    enoxaparin  40 mg Subcutaneous Daily    gabapentin  300 mg Oral TID    ibuprofen  800 mg Intravenous Q8H    Followed by    ibuprofen  800 mg Oral Q8H    levothyroxine  200 mcg Oral Daily    mupirocin   Nasal BID    oxybutynin  5 mg Oral TID     Continuous Infusions:  PRN Meds:cyclobenzaprine, ondansetron, oxyCODONE, oxyCODONE, sodium chloride 0.9%, traMADoL    Review of patient's allergies indicates:   Allergen Reactions    Penicillin g      unknown    Penicillins Hives     childhood allergy       Objective:     Vital Signs (Most Recent):  Temp: 96.7 °F (35.9 °C) (03/26/20 0616)  Pulse: (!) 122 (03/26/20 0616)  Resp: 18 (03/26/20 0616)  BP: 107/63 (03/26/20 0616)  SpO2: 96 %  (03/26/20 0616) Vital Signs (24h Range):  Temp:  [96.7 °F (35.9 °C)-99 °F (37.2 °C)] 96.7 °F (35.9 °C)  Pulse:  [] 122  Resp:  [12-21] 18  SpO2:  [92 %-100 %] 96 %  BP: (107-164)/(57-88) 107/63  Arterial Line BP: (108-153)/(58-82) 153/82     Weight: 119.7 kg (264 lb)  Body mass index is 42.61 kg/m².    Intake/Output - Last 3 Shifts       03/24 0700 - 03/25 0659 03/25 0700 - 03/26 0659    I.V. (mL/kg)  2541.3 (21.2)    Total Intake(mL/kg)  2541.3 (21.2)    Urine (mL/kg/hr)  2280 (0.8)    Stool  0    Blood  250    Total Output  2530    Net  +11.3          Stool Occurrence  0 x             Physical Exam:   Constitutional: She is oriented to person, place, and time. She appears well-developed and well-nourished. No distress.    HENT:   Head: Normocephalic and atraumatic.      Cardiovascular:   Tachycardic    Pulmonary/Chest: Effort normal. No respiratory distress.        Abdominal: Soft. She exhibits abdominal incision (island dressing in place over vertical midline incision and is clean and dry). There is no tenderness. There is no rebound and no guarding.     Genitourinary:   Genitourinary Comments: York in place draining clear, yellow urine           Musculoskeletal: Normal range of motion. She exhibits no edema or tenderness.   SCDs in place       Neurological: She is alert and oriented to person, place, and time.    Skin: Skin is warm and dry.    Psychiatric: She has a normal mood and affect. Her behavior is normal. Judgment and thought content normal.       Lines/Drains/Airways     Drain                 Urethral Catheter 03/25/20 0846 Straight-tip;Non-latex 16 Fr. less than 1 day          Peripheral Intravenous Line                 Peripheral IV - Single Lumen 02/12/20 1040 22 G Right Hand 42 days         Peripheral IV - Single Lumen 03/25/20 0655 18 G Left Hand less than 1 day         Peripheral IV - Single Lumen 03/25/20 0826 18 G Right Hand less than 1 day                Laboratory:  BMP:   Recent Labs    Lab 03/26/20  0305         K 4.3      CO2 20*   BUN 6   CREATININE 0.7   CALCIUM 10.3   MG 2.0    and CBC:   Recent Labs   Lab 03/26/20  0305   WBC 11.39   HGB 11.7*   HCT 39.5   *     Assessment/Plan:     * Malignant neoplasm of sigmoid colon  POD#1  - Pain management per primary team  - H/H stable, 11.7/39.5 this AM  - York to be removed this AM  - Advanced to regular diet today  - SCDs and lovenox for DVT ppx    We will sign off. Please page GYN oncology with any questions.        Abbie Gallegos MD  Gynecologic Oncology  [facility]

## 2020-03-26 NOTE — SUBJECTIVE & OBJECTIVE
Interval History:   NAEON. Patient states that her abdominal pain was not well-controlled overnight with IV tylenol, IV ibuprofen, and oxy IR 10mg x 2. She is tolerating water without nausea/vomiting.     Scheduled Meds:   acetaminophen  1,000 mg Intravenous Q8H    Followed by    acetaminophen  1,000 mg Oral Q8H    alvimopan  12 mg Oral BID    buPROPion  300 mg Oral Daily    enoxaparin  40 mg Subcutaneous Daily    gabapentin  300 mg Oral TID    ibuprofen  800 mg Intravenous Q8H    Followed by    ibuprofen  800 mg Oral Q8H    levothyroxine  200 mcg Oral Daily    mupirocin   Nasal BID    oxybutynin  5 mg Oral TID     Continuous Infusions:  PRN Meds:cyclobenzaprine, ondansetron, oxyCODONE, oxyCODONE, sodium chloride 0.9%, traMADoL    Review of patient's allergies indicates:   Allergen Reactions    Penicillin g      unknown    Penicillins Hives     childhood allergy       Objective:     Vital Signs (Most Recent):  Temp: 96.7 °F (35.9 °C) (03/26/20 0616)  Pulse: (!) 122 (03/26/20 0616)  Resp: 18 (03/26/20 0616)  BP: 107/63 (03/26/20 0616)  SpO2: 96 % (03/26/20 0616) Vital Signs (24h Range):  Temp:  [96.7 °F (35.9 °C)-99 °F (37.2 °C)] 96.7 °F (35.9 °C)  Pulse:  [] 122  Resp:  [12-21] 18  SpO2:  [92 %-100 %] 96 %  BP: (107-164)/(57-88) 107/63  Arterial Line BP: (108-153)/(58-82) 153/82     Weight: 119.7 kg (264 lb)  Body mass index is 42.61 kg/m².    Intake/Output - Last 3 Shifts       03/24 0700 - 03/25 0659 03/25 0700 - 03/26 0659    I.V. (mL/kg)  2541.3 (21.2)    Total Intake(mL/kg)  2541.3 (21.2)    Urine (mL/kg/hr)  2280 (0.8)    Stool  0    Blood  250    Total Output  2530    Net  +11.3          Stool Occurrence  0 x             Physical Exam:   Constitutional: She is oriented to person, place, and time. She appears well-developed and well-nourished. No distress.    HENT:   Head: Normocephalic and atraumatic.      Cardiovascular:   Tachycardic    Pulmonary/Chest: Effort normal. No respiratory  distress.        Abdominal: Soft. She exhibits abdominal incision (island dressing in place over vertical midline incision and is clean and dry). There is no tenderness. There is no rebound and no guarding.     Genitourinary:   Genitourinary Comments: York in place draining clear, yellow urine           Musculoskeletal: Normal range of motion. She exhibits no edema or tenderness.   SCDs in place       Neurological: She is alert and oriented to person, place, and time.    Skin: Skin is warm and dry.    Psychiatric: She has a normal mood and affect. Her behavior is normal. Judgment and thought content normal.       Lines/Drains/Airways     Drain                 Urethral Catheter 03/25/20 0846 Straight-tip;Non-latex 16 Fr. less than 1 day          Peripheral Intravenous Line                 Peripheral IV - Single Lumen 02/12/20 1040 22 G Right Hand 42 days         Peripheral IV - Single Lumen 03/25/20 0655 18 G Left Hand less than 1 day         Peripheral IV - Single Lumen 03/25/20 0826 18 G Right Hand less than 1 day                Laboratory:  BMP:   Recent Labs   Lab 03/26/20  0305         K 4.3      CO2 20*   BUN 6   CREATININE 0.7   CALCIUM 10.3   MG 2.0    and CBC:   Recent Labs   Lab 03/26/20  0305   WBC 11.39   HGB 11.7*   HCT 39.5   *

## 2020-03-26 NOTE — PLAN OF CARE
Plan of care reviewed with patient who verbalized understanding. AAOx4. VSS on room air. No complaints of nausea. PRN Oxycodone given for pain. ML w/ telfa border dressing. X4 lap sites w/ steri strips. Pt's lopez removed a 0600 this AM. Pt due to void between 12 pm and 2 pm. Safety precautions maintained throughout shift. Patient mostly slept in between care. No acute events this shift. WCTM.

## 2020-03-27 LAB
ANION GAP SERPL CALC-SCNC: 5 MMOL/L (ref 8–16)
BASOPHILS # BLD AUTO: 0.04 K/UL (ref 0–0.2)
BASOPHILS NFR BLD: 0.5 % (ref 0–1.9)
BUN SERPL-MCNC: 8 MG/DL (ref 6–20)
CALCIUM SERPL-MCNC: 9.7 MG/DL (ref 8.7–10.5)
CHLORIDE SERPL-SCNC: 108 MMOL/L (ref 95–110)
CO2 SERPL-SCNC: 23 MMOL/L (ref 23–29)
CREAT SERPL-MCNC: 0.8 MG/DL (ref 0.5–1.4)
DIFFERENTIAL METHOD: ABNORMAL
EOSINOPHIL # BLD AUTO: 0.3 K/UL (ref 0–0.5)
EOSINOPHIL NFR BLD: 3.4 % (ref 0–8)
ERYTHROCYTE [DISTWIDTH] IN BLOOD BY AUTOMATED COUNT: 16.7 % (ref 11.5–14.5)
EST. GFR  (AFRICAN AMERICAN): >60 ML/MIN/1.73 M^2
EST. GFR  (NON AFRICAN AMERICAN): >60 ML/MIN/1.73 M^2
GLUCOSE SERPL-MCNC: 96 MG/DL (ref 70–110)
HCT VFR BLD AUTO: 32.3 % (ref 37–48.5)
HGB BLD-MCNC: 9.4 G/DL (ref 12–16)
IMM GRANULOCYTES # BLD AUTO: 0.07 K/UL (ref 0–0.04)
IMM GRANULOCYTES NFR BLD AUTO: 0.8 % (ref 0–0.5)
LYMPHOCYTES # BLD AUTO: 1.6 K/UL (ref 1–4.8)
LYMPHOCYTES NFR BLD: 17.5 % (ref 18–48)
MAGNESIUM SERPL-MCNC: 1.8 MG/DL (ref 1.6–2.6)
MCH RBC QN AUTO: 24.6 PG (ref 27–31)
MCHC RBC AUTO-ENTMCNC: 29.1 G/DL (ref 32–36)
MCV RBC AUTO: 85 FL (ref 82–98)
MONOCYTES # BLD AUTO: 0.9 K/UL (ref 0.3–1)
MONOCYTES NFR BLD: 9.7 % (ref 4–15)
NEUTROPHILS # BLD AUTO: 6.1 K/UL (ref 1.8–7.7)
NEUTROPHILS NFR BLD: 68.1 % (ref 38–73)
NRBC BLD-RTO: 0 /100 WBC
PHOSPHATE SERPL-MCNC: 2 MG/DL (ref 2.7–4.5)
PLATELET # BLD AUTO: 405 K/UL (ref 150–350)
PMV BLD AUTO: 10.1 FL (ref 9.2–12.9)
POTASSIUM SERPL-SCNC: 3.9 MMOL/L (ref 3.5–5.1)
RBC # BLD AUTO: 3.82 M/UL (ref 4–5.4)
SODIUM SERPL-SCNC: 136 MMOL/L (ref 136–145)
WBC # BLD AUTO: 8.87 K/UL (ref 3.9–12.7)

## 2020-03-27 PROCEDURE — 20600001 HC STEP DOWN PRIVATE ROOM

## 2020-03-27 PROCEDURE — 84100 ASSAY OF PHOSPHORUS: CPT

## 2020-03-27 PROCEDURE — 25000003 PHARM REV CODE 250: Performed by: NURSE PRACTITIONER

## 2020-03-27 PROCEDURE — 85025 COMPLETE CBC W/AUTO DIFF WBC: CPT

## 2020-03-27 PROCEDURE — 25000003 PHARM REV CODE 250: Performed by: STUDENT IN AN ORGANIZED HEALTH CARE EDUCATION/TRAINING PROGRAM

## 2020-03-27 PROCEDURE — 36415 COLL VENOUS BLD VENIPUNCTURE: CPT

## 2020-03-27 PROCEDURE — 80048 BASIC METABOLIC PNL TOTAL CA: CPT

## 2020-03-27 PROCEDURE — 83735 ASSAY OF MAGNESIUM: CPT

## 2020-03-27 PROCEDURE — 63600175 PHARM REV CODE 636 W HCPCS: Performed by: COLON & RECTAL SURGERY

## 2020-03-27 RX ORDER — ENOXAPARIN SODIUM 100 MG/ML
40 INJECTION SUBCUTANEOUS EVERY 24 HOURS
Status: DISCONTINUED | OUTPATIENT
Start: 2020-03-27 | End: 2020-03-27

## 2020-03-27 RX ORDER — SODIUM,POTASSIUM PHOSPHATES 280-250MG
1 POWDER IN PACKET (EA) ORAL 3 TIMES DAILY
Status: COMPLETED | OUTPATIENT
Start: 2020-03-27 | End: 2020-03-27

## 2020-03-27 RX ORDER — ENOXAPARIN SODIUM 100 MG/ML
40 INJECTION SUBCUTANEOUS EVERY 12 HOURS
Status: DISCONTINUED | OUTPATIENT
Start: 2020-03-27 | End: 2020-03-28 | Stop reason: HOSPADM

## 2020-03-27 RX ADMIN — ACETAMINOPHEN 1000 MG: 500 TABLET ORAL at 02:03

## 2020-03-27 RX ADMIN — CYCLOBENZAPRINE HYDROCHLORIDE 10 MG: 5 TABLET, FILM COATED ORAL at 09:03

## 2020-03-27 RX ADMIN — GABAPENTIN 300 MG: 300 CAPSULE ORAL at 08:03

## 2020-03-27 RX ADMIN — OXYCODONE HYDROCHLORIDE 15 MG: 10 TABLET ORAL at 09:03

## 2020-03-27 RX ADMIN — GABAPENTIN 300 MG: 300 CAPSULE ORAL at 02:03

## 2020-03-27 RX ADMIN — OXYBUTYNIN CHLORIDE 5 MG: 5 TABLET ORAL at 09:03

## 2020-03-27 RX ADMIN — OXYCODONE HYDROCHLORIDE 15 MG: 10 TABLET ORAL at 08:03

## 2020-03-27 RX ADMIN — LEVOTHYROXINE SODIUM 200 MCG: 100 TABLET ORAL at 08:03

## 2020-03-27 RX ADMIN — IBUPROFEN 800 MG: 400 TABLET, FILM COATED ORAL at 06:03

## 2020-03-27 RX ADMIN — OXYBUTYNIN CHLORIDE 5 MG: 5 TABLET ORAL at 08:03

## 2020-03-27 RX ADMIN — MUPIROCIN: 20 OINTMENT TOPICAL at 08:03

## 2020-03-27 RX ADMIN — IBUPROFEN 800 MG: 400 TABLET, FILM COATED ORAL at 09:03

## 2020-03-27 RX ADMIN — OXYCODONE HYDROCHLORIDE 15 MG: 10 TABLET ORAL at 03:03

## 2020-03-27 RX ADMIN — OXYCODONE HYDROCHLORIDE 15 MG: 10 TABLET ORAL at 02:03

## 2020-03-27 RX ADMIN — ACETAMINOPHEN 500 MG: 500 TABLET ORAL at 09:03

## 2020-03-27 RX ADMIN — POTASSIUM & SODIUM PHOSPHATES POWDER PACK 280-160-250 MG 1 PACKET: 280-160-250 PACK at 02:03

## 2020-03-27 RX ADMIN — BUPROPION HYDROCHLORIDE 300 MG: 150 TABLET, FILM COATED, EXTENDED RELEASE ORAL at 08:03

## 2020-03-27 RX ADMIN — OXYBUTYNIN CHLORIDE 5 MG: 5 TABLET ORAL at 02:03

## 2020-03-27 RX ADMIN — IBUPROFEN 800 MG: 400 TABLET, FILM COATED ORAL at 02:03

## 2020-03-27 RX ADMIN — ENOXAPARIN SODIUM 40 MG: 100 INJECTION SUBCUTANEOUS at 09:03

## 2020-03-27 RX ADMIN — MUPIROCIN: 20 OINTMENT TOPICAL at 09:03

## 2020-03-27 RX ADMIN — GABAPENTIN 300 MG: 300 CAPSULE ORAL at 09:03

## 2020-03-27 RX ADMIN — POTASSIUM & SODIUM PHOSPHATES POWDER PACK 280-160-250 MG 1 PACKET: 280-160-250 PACK at 08:03

## 2020-03-27 RX ADMIN — ACETAMINOPHEN 1000 MG: 500 TABLET ORAL at 06:03

## 2020-03-27 NOTE — PLAN OF CARE
Plan of care reviewed on am rounds, afrebrile, sbp 's, tele sr rate 90-s - low 100, tolerating diet without n/v, lap sites and lower abd incision intact with ss/ border dressing, incisional pain controlled with post op regimen Tylenol/ Ibuprofen/ and prn Oxy, up ambulatory to bathroom with standby assist, some c/o dizziness this am when first getting out of bed, encouraged to dangle for few minutes prior to standing up/ call for assist as needed, repeat CBC with sl drop noted, will recheck in am , pleasant and cooperative with crae, emotional support provided throughout shift.

## 2020-03-27 NOTE — PT/OT/SLP PROGRESS
Physical Therapy      Patient Name:  Gail Mckinnon   MRN:  [id number]      Pt not seen on this date.  Attempted to visit pt twice for treatment session today.  First attempt pt recently receiving breakfast and request therapy to return at later time. Second attempt pt found sitting up in sofa and reports mod dizziness and request therapy to return later.  PT unable to return later in day for additional attempt.  Pt states ambulating in room without difficulty. Chart reviewed and pt remain appropriate for PT services at this time.  Will see pt as schedule allows.      Timmy Renee, PT,DPT  3/27/2020

## 2020-03-27 NOTE — PROGRESS NOTES
[facility]  Colorectal Surgery  Progress Note    Patient Name: Gail Mckinnon  MRN: [id number]  Admission Date: 3/25/2020  Hospital Length of Stay: 2 days  Attending Physician: Silvio Man MD    Subjective:     Interval History:   Blood pressure borderline yesterday; Hgb 9.9 from 11.7.  Lovenox held.    No further hypotension.  Pain improved  Denies nausea/vomiting  Passing flatus and had a BM  Ambulating.    Post-Op Info:  Procedure(s) (LRB):  COLECTOMY, SIGMOID, LAPAROSCOPIC, flex sig, ERAS high (N/A)  HYSTERECTOMY, TOTAL, ABDOMINAL, WITH BILATERAL SALPINGO-OOPHORECTOMY (N/A)  CYSTOSCOPY, WITH URETERAL STENT INSERTION (Bilateral)  MOBILIZATION, SPLENIC FLEXURE   2 Days Post-Op      Medications:  Continuous Infusions:  Scheduled Meds:   acetaminophen  1,000 mg Oral Q8H    alvimopan  12 mg Oral BID    buPROPion  300 mg Oral Daily    gabapentin  300 mg Oral TID    ibuprofen  800 mg Oral Q8H    levothyroxine  200 mcg Oral Daily    mupirocin   Nasal BID    oxybutynin  5 mg Oral TID    potassium, sodium phosphates  1 packet Oral TID     PRN Meds:   cyclobenzaprine    ondansetron    oxyCODONE    oxyCODONE    sodium chloride 0.9%    traMADoL        Objective:     Vital Signs (Most Recent):  Temp: 97.8 °F (36.6 °C) (03/27/20 0727)  Pulse: 89 (03/27/20 0727)  Resp: 16 (03/27/20 0727)  BP: 107/67 (03/27/20 0727)  SpO2: (!) 94 % (03/27/20 0727) Vital Signs (24h Range):  Temp:  [96.5 °F (35.8 °C)-98.5 °F (36.9 °C)] 97.8 °F (36.6 °C)  Pulse:  [] 89  Resp:  [12-18] 16  SpO2:  [93 %-96 %] 94 %  BP: ()/(49-67) 107/67     Intake/Output - Last 3 Shifts       03/25 0700 - 03/26 0659 03/26 0700 - 03/27 0659 03/27 0700 - 03/28 0659    P.O. 180 1710     I.V. (mL/kg) 2541.3 (21.2)      IV Piggyback  350     Total Intake(mL/kg) 2721.3 (22.7) 2060 (17.2)     Urine (mL/kg/hr) 2280 (0.8) 2250 (0.8)     Stool 0 0     Blood 250      Total Output 2530 2250     Net +191.3 -190             Stool Occurrence 0 x 0 x           Physical Exam   Constitutional: She is oriented to person, place, and time. She appears well-nourished. No distress.   HENT:   Head: Normocephalic.   Eyes: Conjunctivae are normal.   Neck: No tracheal deviation present.   Cardiovascular: Regular rhythm.   Pulmonary/Chest: Effort normal. No respiratory distress.   Abdominal:   Soft, appropriately tender, rotund, non-distended  Incision dressings clean, dry, intact   Neurological: She is alert and oriented to person, place, and time.       Significant Labs:  BMP (Last 3 Results):   Recent Labs   Lab 03/26/20  0305 03/27/20  0424    96    136   K 4.3 3.9    108   CO2 20* 23   BUN 6 8   CREATININE 0.7 0.8   CALCIUM 10.3 9.7   MG 2.0 1.8     CBC (Last 3 Results):   Recent Labs   Lab 03/26/20  0305 03/26/20  1440 03/27/20  0424   WBC 11.39 10.40 8.87   RBC 4.77 4.07 3.82*   HGB 11.7* 9.9* 9.4*   HCT 39.5 34.4* 32.3*   * 466* 405*   MCV 83 85 85   MCH 24.5* 24.3* 24.6*   MCHC 29.6* 28.8* 29.1*       Significant Diagnostics:  None    Assessment/Plan:     * Malignant neoplasm of sigmoid colon  POD#2 s/p lap assisted sigmoid colectomy with en bloc hysterectomy and BSO.  Doing well    - ERAS multimodal pain control; [id number] mg and 15 mg available  - Regular   - Saline lock IV  - Ambulate; incentive spirometer.  - Restart Lovenox; recheck hemoglobin tomorrow.  No evidence of active bleeding.  - Replete PO4 for hypophosphatemia  - likely home tomorrow    Hypothyroid  Continue home levothyroxine        Khanh Padron MD  Colorectal Surgery  [facility]

## 2020-03-27 NOTE — ASSESSMENT & PLAN NOTE
POD#2 s/p lap assisted sigmoid colectomy with en bloc hysterectomy and BSO.  Doing well    - ERAS multimodal pain control; [id number] mg and 15 mg available  - Regular   - Saline lock IV  - Ambulate; incentive spirometer.  - Continue to hold Lovenox with hypotension yesterday and slowhemoglobin drift.  No evidence of active bleeding.  - Replete PO4 for hypophosphatemia  - likely home tomorrow

## 2020-03-27 NOTE — SUBJECTIVE & OBJECTIVE
Subjective:     Interval History:   Blood pressure borderline yesterday; Hgb 9.9 from 11.7.  Lovenox held.    No further hypotension.  Pain improved  Denies nausea/vomiting  Passing flatus and had a BM  Ambulating.    Post-Op Info:  Procedure(s) (LRB):  COLECTOMY, SIGMOID, LAPAROSCOPIC, flex sig, ERAS high (N/A)  HYSTERECTOMY, TOTAL, ABDOMINAL, WITH BILATERAL SALPINGO-OOPHORECTOMY (N/A)  CYSTOSCOPY, WITH URETERAL STENT INSERTION (Bilateral)  MOBILIZATION, SPLENIC FLEXURE   2 Days Post-Op      Medications:  Continuous Infusions:  Scheduled Meds:   acetaminophen  1,000 mg Oral Q8H    alvimopan  12 mg Oral BID    buPROPion  300 mg Oral Daily    gabapentin  300 mg Oral TID    ibuprofen  800 mg Oral Q8H    levothyroxine  200 mcg Oral Daily    mupirocin   Nasal BID    oxybutynin  5 mg Oral TID    potassium, sodium phosphates  1 packet Oral TID     PRN Meds:   cyclobenzaprine    ondansetron    oxyCODONE    oxyCODONE    sodium chloride 0.9%    traMADoL        Objective:     Vital Signs (Most Recent):  Temp: 97.8 °F (36.6 °C) (03/27/20 0727)  Pulse: 89 (03/27/20 0727)  Resp: 16 (03/27/20 0727)  BP: 107/67 (03/27/20 0727)  SpO2: (!) 94 % (03/27/20 0727) Vital Signs (24h Range):  Temp:  [96.5 °F (35.8 °C)-98.5 °F (36.9 °C)] 97.8 °F (36.6 °C)  Pulse:  [] 89  Resp:  [12-18] 16  SpO2:  [93 %-96 %] 94 %  BP: ()/(49-67) 107/67     Intake/Output - Last 3 Shifts       03/25 0700 - 03/26 0659 03/26 0700 - 03/27 0659 03/27 0700 - 03/28 0659    P.O. 180 1710     I.V. (mL/kg) 2541.3 (21.2)      IV Piggyback  350     Total Intake(mL/kg) 2721.3 (22.7) 2060 (17.2)     Urine (mL/kg/hr) 2280 (0.8) 2250 (0.8)     Stool 0 0     Blood 250      Total Output 2530 2250     Net +191.3 -190            Stool Occurrence 0 x 0 x           Physical Exam   Constitutional: She is oriented to person, place, and time. She appears well-nourished. No distress.   HENT:   Head: Normocephalic.   Eyes: Conjunctivae are normal.   Neck:  No tracheal deviation present.   Cardiovascular: Regular rhythm.   Pulmonary/Chest: Effort normal. No respiratory distress.   Abdominal:   Soft, appropriately tender, rotund, non-distended  Incision dressings clean, dry, intact   Neurological: She is alert and oriented to person, place, and time.       Significant Labs:  BMP (Last 3 Results):   Recent Labs   Lab 03/26/20  0305 03/27/20  0424    96    136   K 4.3 3.9    108   CO2 20* 23   BUN 6 8   CREATININE 0.7 0.8   CALCIUM 10.3 9.7   MG 2.0 1.8     CBC (Last 3 Results):   Recent Labs   Lab 03/26/20  0305 03/26/20  1440 03/27/20  0424   WBC 11.39 10.40 8.87   RBC 4.77 4.07 3.82*   HGB 11.7* 9.9* 9.4*   HCT 39.5 34.4* 32.3*   * 466* 405*   MCV 83 85 85   MCH 24.5* 24.3* 24.6*   MCHC 29.6* 28.8* 29.1*       Significant Diagnostics:  None

## 2020-03-27 NOTE — PLAN OF CARE
POC reviewed with patient. Patient AAOx4. Abd wound incision clean dry and intact with steristrips intact. Tolerating Regular diet well. Right hand 18g intact and saline locked. No bm reported today but passing gas. Pain managed with PRN pain meds with relief. Patient reports she was getting a bag of chips from the snack machine, she kneeled down to get the chips from the machine and slowly fell on her bottom. Denies hitting her head. Denies any back pain or pelvic pain. Wounds intact, no dehisence noted. MD notified. Will continue to monitor. Call light in reach.

## 2020-03-27 NOTE — PLAN OF CARE
Plan of care reviewed with pt/verbalized understanding, vss, patient c/o of incisional pain controlled wit po pain medications, patient able to make needs known, on regular diet tolerating well with no c/o of nausea/vomiting, ambulatory to bathroom, call-light within reach, will continue to monitor.

## 2020-03-28 ENCOUNTER — PATIENT MESSAGE (OUTPATIENT)
Dept: SURGERY | Facility: CLINIC | Age: 50
End: 2020-03-28

## 2020-03-28 VITALS
BODY MASS INDEX: 42.43 KG/M2 | HEIGHT: 66 IN | SYSTOLIC BLOOD PRESSURE: 106 MMHG | HEART RATE: 74 BPM | RESPIRATION RATE: 18 BRPM | OXYGEN SATURATION: 96 % | DIASTOLIC BLOOD PRESSURE: 59 MMHG | TEMPERATURE: 98 F | WEIGHT: 264 LBS

## 2020-03-28 LAB
CRP SERPL-MCNC: 78.6 MG/L (ref 0–8.2)
HGB BLD-MCNC: 8.8 G/DL (ref 12–16)

## 2020-03-28 PROCEDURE — 25000003 PHARM REV CODE 250: Performed by: STUDENT IN AN ORGANIZED HEALTH CARE EDUCATION/TRAINING PROGRAM

## 2020-03-28 PROCEDURE — 36415 COLL VENOUS BLD VENIPUNCTURE: CPT

## 2020-03-28 PROCEDURE — 25000003 PHARM REV CODE 250: Performed by: NURSE PRACTITIONER

## 2020-03-28 PROCEDURE — 85018 HEMOGLOBIN: CPT

## 2020-03-28 PROCEDURE — 63600175 PHARM REV CODE 636 W HCPCS: Performed by: COLON & RECTAL SURGERY

## 2020-03-28 PROCEDURE — 86140 C-REACTIVE PROTEIN: CPT

## 2020-03-28 RX ORDER — ACETAMINOPHEN 500 MG
1000 TABLET ORAL EVERY 8 HOURS
Qty: 60 TABLET | Refills: 0 | Status: SHIPPED | OUTPATIENT
Start: 2020-03-28

## 2020-03-28 RX ORDER — OXYCODONE HYDROCHLORIDE 10 MG/1
10 TABLET ORAL EVERY 4 HOURS PRN
Qty: 30 TABLET | Refills: 0 | Status: SHIPPED | OUTPATIENT
Start: 2020-03-28 | End: 2020-04-03 | Stop reason: SDUPTHER

## 2020-03-28 RX ADMIN — IBUPROFEN 800 MG: 400 TABLET, FILM COATED ORAL at 06:03

## 2020-03-28 RX ADMIN — IBUPROFEN 800 MG: 400 TABLET, FILM COATED ORAL at 02:03

## 2020-03-28 RX ADMIN — OXYBUTYNIN CHLORIDE 5 MG: 5 TABLET ORAL at 08:03

## 2020-03-28 RX ADMIN — ACETAMINOPHEN 1000 MG: 500 TABLET ORAL at 02:03

## 2020-03-28 RX ADMIN — GABAPENTIN 300 MG: 300 CAPSULE ORAL at 02:03

## 2020-03-28 RX ADMIN — MUPIROCIN: 20 OINTMENT TOPICAL at 08:03

## 2020-03-28 RX ADMIN — ACETAMINOPHEN 1000 MG: 500 TABLET ORAL at 06:03

## 2020-03-28 RX ADMIN — LEVOTHYROXINE SODIUM 200 MCG: 100 TABLET ORAL at 08:03

## 2020-03-28 RX ADMIN — OXYCODONE HYDROCHLORIDE 15 MG: 10 TABLET ORAL at 06:03

## 2020-03-28 RX ADMIN — OXYCODONE HYDROCHLORIDE 15 MG: 10 TABLET ORAL at 10:03

## 2020-03-28 RX ADMIN — GABAPENTIN 300 MG: 300 CAPSULE ORAL at 08:03

## 2020-03-28 RX ADMIN — ENOXAPARIN SODIUM 40 MG: 100 INJECTION SUBCUTANEOUS at 08:03

## 2020-03-28 RX ADMIN — BUPROPION HYDROCHLORIDE 300 MG: 150 TABLET, FILM COATED, EXTENDED RELEASE ORAL at 08:03

## 2020-03-28 RX ADMIN — OXYCODONE HYDROCHLORIDE 15 MG: 10 TABLET ORAL at 02:03

## 2020-03-28 NOTE — ASSESSMENT & PLAN NOTE
POD#3 s/p lap assisted sigmoid colectomy with en bloc hysterectomy and BSO.  Doing well    - ERAS multimodal pain control; [id number] mg and 15 mg available  - Regular diet  - Saline lock IV  - Ambulate; incentive spirometer.  - discharge home today. Plan for virtual visit for follow up in 2 weeks.

## 2020-03-28 NOTE — PROGRESS NOTES
"Patient seen and examined.  Continues to pass flatus.  Tolerating general diet.  Pain well controlled.  No complaints overnight.    /60 (BP Location: Right arm, Patient Position: Lying)   Pulse 76   Temp 97.7 °F (36.5 °C) (Oral)   Resp 18   Ht 5' 6" (1.676 m)   Wt 119.7 kg (264 lb)   LMP 01/22/2020   SpO2 97%   Breastfeeding? No   BMI 42.61 kg/m²   Awake, alert, comfortable  Incision clean dry and intact with Steri-Strips in place  Abdomen soft, appropriately tender, minimal distention    Labs reviewed    49-year-old female postop day 3 status post laparoscopic sigmoid colectomy with en bloc hysterectomy and bilateral salpingo-oophorectomy, doing well    Low-fiber diet  Home today, patient feels comfortable with this.  Discussed signs and symptoms to monitor for at home.    Nela Sinha    "

## 2020-03-28 NOTE — PROGRESS NOTES
[facility]  Colorectal Surgery  Progress Note    Patient Name: Gail Mckinnon  MRN: [id number]  Admission Date: 3/25/2020  Hospital Length of Stay: 3 days  Attending Physician: Silvio Man MD    Subjective:     Interval History: no acute events overnight. Pain controlled. Passing flatus, tolerating diet, ambulating.     Post-Op Info:  Procedure(s) (LRB):  COLECTOMY, SIGMOID, LAPAROSCOPIC, flex sig, ERAS high (N/A)  HYSTERECTOMY, TOTAL, ABDOMINAL, WITH BILATERAL SALPINGO-OOPHORECTOMY (N/A)  CYSTOSCOPY, WITH URETERAL STENT INSERTION (Bilateral)  MOBILIZATION, SPLENIC FLEXURE   3 Days Post-Op      Medications:  Continuous Infusions:  Scheduled Meds:   acetaminophen  1,000 mg Oral Q8H    buPROPion  300 mg Oral Daily    enoxaparin  40 mg Subcutaneous Q12H    gabapentin  300 mg Oral TID    ibuprofen  800 mg Oral Q8H    levothyroxine  200 mcg Oral Daily    mupirocin   Nasal BID     PRN Meds:   cyclobenzaprine    ondansetron    oxyCODONE    oxyCODONE    sodium chloride 0.9%    traMADoL        Objective:     Vital Signs (Most Recent):  Temp: 97.7 °F (36.5 °C) (03/28/20 0734)  Pulse: 76 (03/28/20 0734)  Resp: 18 (03/28/20 0734)  BP: 105/60 (03/28/20 0734)  SpO2: 97 % (03/28/20 0734) Vital Signs (24h Range):  Temp:  [96.2 °F (35.7 °C)-97.9 °F (36.6 °C)] 97.7 °F (36.5 °C)  Pulse:  [64-98] 76  Resp:  [16-18] 18  SpO2:  [92 %-98 %] 97 %  BP: (100-121)/(53-60) 105/60     Intake/Output - Last 3 Shifts       03/26 0700 - 03/27 0659 03/27 0700 - 03/28 0659 03/28 0700 - 03/29 0659    P.O. 1710 330     I.V. (mL/kg)       IV Piggyback 350      Total Intake(mL/kg) 2060 (17.2) 330 (2.8)     Urine (mL/kg/hr) 2250 (0.8) 1700 (0.6)     Stool 0 0     Blood       Total Output 2250 1700     Net -190 -1370            Stool Occurrence 0 x 0 x           Physical Exam   Constitutional: She is oriented to person, place, and time. She appears well-nourished. No distress.   HENT:   Head: Normocephalic.    Eyes: Conjunctivae are normal.   Neck: No tracheal deviation present.   Cardiovascular: Regular rhythm.   Pulmonary/Chest: Effort normal. No respiratory distress.   Abdominal:   Soft, appropriately tender, rotund, non-distended  Incision dressings clean, dry, intact   Neurological: She is alert and oriented to person, place, and time.   Skin: Skin is warm and dry.   Psychiatric: She has a normal mood and affect.     Significant Labs:  BMP (Last 3 Results):   Recent Labs   Lab 03/26/20  0305 03/27/20  0424    96    136   K 4.3 3.9    108   CO2 20* 23   BUN 6 8   CREATININE 0.7 0.8   CALCIUM 10.3 9.7   MG 2.0 1.8     CBC (Last 3 Results):   Recent Labs   Lab 03/26/20  0305 03/26/20  1440 03/27/20  0424 03/28/20  0424   WBC 11.39 10.40 8.87  --    RBC 4.77 4.07 3.82*  --    HGB 11.7* 9.9* 9.4* 8.8*   HCT 39.5 34.4* 32.3*  --    * 466* 405*  --    MCV 83 85 85  --    MCH 24.5* 24.3* 24.6*  --    MCHC 29.6* 28.8* 29.1*  --      CRP:   Recent Labs   Lab 03/28/20  0752   CRP 78.6*       Significant Diagnostics:  I have reviewed all pertinent imaging results/findings within the past 24 hours.    Assessment/Plan:     * Malignant neoplasm of sigmoid colon  POD#3 s/p lap assisted sigmoid colectomy with en bloc hysterectomy and BSO.  Doing well    - ERAS multimodal pain control; [id number] mg and 15 mg available  - Regular diet  - Saline lock IV  - Ambulate; incentive spirometer.  - discharge home today. Plan for virtual visit for follow up in 2 weeks.    Hypothyroid  Continue home levothyroxine          Freddie Fan MD  Colorectal Surgery  [facility]

## 2020-03-28 NOTE — HPI
49-year-old woman with sigmoid colon cancer with apparent local involvement of the uterus and ovary. She was seen in clinic and scheduled for a laparoscopic assisted sigmoid colectomy with hysterectomy and oophorectomy. She presented on 3/25/20 and underwent this elective procedure.

## 2020-03-28 NOTE — NURSING
Patient voiced understanding of discharge instructions. IVs discontinued, catheter intact. Patient transported to elevator via wheelchair accompanied by transporter. Family parked on 1st floor parking garage. Patient stopped by pharmacy to get meds also. Patient aaox4 upon discharge.

## 2020-03-28 NOTE — DISCHARGE SUMMARY
[facility]  Colorectal Surgery  Discharge Summary      Patient Name: Gail Mckinnon  MRN: [id number]  Admission Date: 3/25/2020  Hospital Length of Stay: 3 days  Discharge Date and Time:  03/28/2020 10:27 AM  Attending Physician: Silvio Man MD   Discharging Provider: Freddie Fan MD  Primary Care Provider: Tima Mederos MD     HPI:  49-year-old woman with sigmoid colon cancer with apparent local involvement of the uterus and ovary. She was seen in clinic and scheduled for a laparoscopic assisted sigmoid colectomy with hysterectomy and oophorectomy. She presented on 3/25/20 and underwent this elective procedure.    Procedure(s) (LRB):  COLECTOMY, SIGMOID, LAPAROSCOPIC, flex sig, ERAS high (N/A)  HYSTERECTOMY, TOTAL, ABDOMINAL, WITH BILATERAL SALPINGO-OOPHORECTOMY (N/A)  CYSTOSCOPY, WITH URETERAL STENT INSERTION (Bilateral)  MOBILIZATION, SPLENIC FLEXURE     Hospital Course:  She underwent sigmoid colectomy and total abdominal historectomy/oophorectomy on 3/25/20. She tolerated the procedure well and was transferred to the floor post operatively. Her lopez catheter was removed on post operative day one and her diet was advanced. She was tolerating a diet, ambulating, spontaneously voiding, with good pain control by post operative day three without any significant setbacks. She was discharged home at that time.         Significant Diagnostic Studies: Labs:   BMP:   Recent Labs   Lab 03/27/20  0424   GLU 96      K 3.9      CO2 23   BUN 8   CREATININE 0.8   CALCIUM 9.7   MG 1.8   , CMP   Recent Labs   Lab 03/27/20  0424      K 3.9      CO2 23   GLU 96   BUN 8   CREATININE 0.8   CALCIUM 9.7   ANIONGAP 5*   ESTGFRAFRICA >60.0   EGFRNONAA >60.0    and CBC   Recent Labs   Lab 03/26/20  1440 03/27/20  0424 03/28/20  0424   WBC 10.40 8.87  --    HGB 9.9* 9.4* 8.8*   HCT 34.4* 32.3*  --    * 405*  --        Pending Diagnostic Studies:     Procedure  Component Value Units Date/Time    Specimen to Pathology, Surgery Gynecology and Obstetrics [[id number]] Collected:  03/25/20 1458    Order Status:  Sent Lab Status:  In process Updated:  03/25/20 1616        Final Active Diagnoses:    Diagnosis Date Noted POA    PRINCIPAL PROBLEM:  Malignant neoplasm of sigmoid colon [C18.7] 03/16/2020 Yes    Hypothyroid [E03.9] 08/24/2012 Yes     Chronic      Problems Resolved During this Admission:      Discharged Condition: good    Disposition: Home or Self Care    Follow Up:  Follow-up Information     Silvio Man MD. Call in 2 weeks.    Specialty:  Colon and Rectal Surgery  Contact information:  [address] [facility] [address]  [phone]                 Patient Instructions:      Diet Adult Regular     Notify your health care provider if you experience any of the following:  temperature >100.4     Notify your health care provider if you experience any of the following:  persistent nausea and vomiting or diarrhea     Notify your health care provider if you experience any of the following:  severe uncontrolled pain     Notify your health care provider if you experience any of the following:  redness, tenderness, or signs of infection (pain, swelling, redness, odor or green/yellow discharge around incision site)     Leave dressing on - Keep it clean, dry, and intact until clinic visit   Order Comments: Steri strips will gradually peel off on their own.     Activity as tolerated     Medications:  Reconciled Home Medications:      Medication List      START taking these medications    acetaminophen 500 MG tablet  Commonly known as:  TYLENOL  Take 2 tablets (1,000 mg total) by mouth every 8 (eight) hours.     oxyCODONE 10 mg Tab immediate release tablet  Commonly known as:  ROXICODONE  Take 1 tablet (10 mg total) by mouth every 4 (four) hours as needed.        CHANGE how you take these medications    busPIRone 10 MG tablet  Commonly known as:  BUSPAR  Take 1  tablet (10 mg total) by mouth 2 (two) times daily.  What changed:    · when to take this  · reasons to take this     cyclobenzaprine 10 MG tablet  Commonly known as:  FLEXERIL  Take 1 tablet (10 mg total) by mouth nightly.  What changed:    · when to take this  · reasons to take this        CONTINUE taking these medications    buPROPion 150 MG TBSR 12 hr tablet  Commonly known as:  WELLBUTRIN SR  Take 1 tablet (150 mg total) by mouth 2 (two) times daily.     levothyroxine 200 MCG tablet  Commonly known as:  SYNTHROID  TAKE 1 TABLET BY MOUTH ONCE DAILY BEFORE BREAKFAST     ondansetron 4 MG tablet  Commonly known as:  ZOFRAN  Take 1 tablet (4 mg total) by mouth every 8 (eight) hours as needed for Nausea.     ONE DAILY MULTIVITAMIN per tablet  Generic drug:  multivitamin  Take 1 tablet by mouth once daily.     traMADoL 50 mg tablet  Commonly known as:  ULTRAM  Take 1 tablet (50 mg total) by mouth 3 (three) times daily as needed for Pain.     VITAMIN C 1000 MG tablet  Generic drug:  ascorbic acid (vitamin C)  Take 1,000 mg by mouth once daily.        STOP taking these medications    HYDROcodone-acetaminophen 5-325 mg per tablet  Commonly known as:  NORCO     ketorolac 10 mg tablet  Commonly known as:  TORADOL     metroNIDAZOLE 500 MG tablet  Commonly known as:  FLAGYL     naproxen 500 MG tablet  Commonly known as:  NAPROSYN     neomycin 500 mg Tab  Commonly known as:  MYCIFRADIN            Freddie Fan MD  Colorectal Surgery  [facility]

## 2020-03-28 NOTE — SUBJECTIVE & OBJECTIVE
Subjective:     Interval History: no acute events overnight. Pain controlled. Passing flatus, tolerating diet, ambulating.     Post-Op Info:  Procedure(s) (LRB):  COLECTOMY, SIGMOID, LAPAROSCOPIC, flex sig, ERAS high (N/A)  HYSTERECTOMY, TOTAL, ABDOMINAL, WITH BILATERAL SALPINGO-OOPHORECTOMY (N/A)  CYSTOSCOPY, WITH URETERAL STENT INSERTION (Bilateral)  MOBILIZATION, SPLENIC FLEXURE   3 Days Post-Op      Medications:  Continuous Infusions:  Scheduled Meds:   acetaminophen  1,000 mg Oral Q8H    buPROPion  300 mg Oral Daily    enoxaparin  40 mg Subcutaneous Q12H    gabapentin  300 mg Oral TID    ibuprofen  800 mg Oral Q8H    levothyroxine  200 mcg Oral Daily    mupirocin   Nasal BID     PRN Meds:   cyclobenzaprine    ondansetron    oxyCODONE    oxyCODONE    sodium chloride 0.9%    traMADoL        Objective:     Vital Signs (Most Recent):  Temp: 97.7 °F (36.5 °C) (03/28/20 0734)  Pulse: 76 (03/28/20 0734)  Resp: 18 (03/28/20 0734)  BP: 105/60 (03/28/20 0734)  SpO2: 97 % (03/28/20 0734) Vital Signs (24h Range):  Temp:  [96.2 °F (35.7 °C)-97.9 °F (36.6 °C)] 97.7 °F (36.5 °C)  Pulse:  [64-98] 76  Resp:  [16-18] 18  SpO2:  [92 %-98 %] 97 %  BP: (100-121)/(53-60) 105/60     Intake/Output - Last 3 Shifts       03/26 0700 - 03/27 0659 03/27 0700 - 03/28 0659 03/28 0700 - 03/29 0659    P.O. 1710 330     I.V. (mL/kg)       IV Piggyback 350      Total Intake(mL/kg) 2060 (17.2) 330 (2.8)     Urine (mL/kg/hr) 2250 (0.8) 1700 (0.6)     Stool 0 0     Blood       Total Output 2250 1700     Net -190 -1370            Stool Occurrence 0 x 0 x           Physical Exam   Constitutional: She is oriented to person, place, and time. She appears well-nourished. No distress.   HENT:   Head: Normocephalic.   Eyes: Conjunctivae are normal.   Neck: No tracheal deviation present.   Cardiovascular: Regular rhythm.   Pulmonary/Chest: Effort normal. No respiratory distress.   Abdominal:   Soft, appropriately tender, rotund,  non-distended  Incision dressings clean, dry, intact   Neurological: She is alert and oriented to person, place, and time.   Skin: Skin is warm and dry.   Psychiatric: She has a normal mood and affect.     Significant Labs:  BMP (Last 3 Results):   Recent Labs   Lab 03/26/20  0305 03/27/20  0424    96    136   K 4.3 3.9    108   CO2 20* 23   BUN 6 8   CREATININE 0.7 0.8   CALCIUM 10.3 9.7   MG 2.0 1.8     CBC (Last 3 Results):   Recent Labs   Lab 03/26/20  0305 03/26/20  1440 03/27/20  0424 03/28/20  0424   WBC 11.39 10.40 8.87  --    RBC 4.77 4.07 3.82*  --    HGB 11.7* 9.9* 9.4* 8.8*   HCT 39.5 34.4* 32.3*  --    * 466* 405*  --    MCV 83 85 85  --    MCH 24.5* 24.3* 24.6*  --    MCHC 29.6* 28.8* 29.1*  --      CRP:   Recent Labs   Lab 03/28/20  0752   CRP 78.6*       Significant Diagnostics:  I have reviewed all pertinent imaging results/findings within the past 24 hours.

## 2020-03-28 NOTE — PLAN OF CARE
Plan of care reviewed with pt/verbalized understanding, vss, patient on regular diet tolerating well with no c/o of nausea/vomiting, patient c/o of incisonal  pain relieved with po pain medications, patient ambulatory, call-light within reach, will continue to monitor.

## 2020-03-28 NOTE — HOSPITAL COURSE
She underwent sigmoid colectomy and total abdominal historectomy/oophorectomy on 3/25/20. She tolerated the procedure well and was transferred to the floor post operatively. Her lopez catheter was removed on post operative day one and her diet was advanced. She was tolerating a diet, ambulating, spontaneously voiding, with good pain control by post operative day three without any significant setbacks. She was discharged home at that time.

## 2020-03-29 LAB
BLD PROD TYP BPU: NORMAL
BLD PROD TYP BPU: NORMAL
BLOOD UNIT EXPIRATION DATE: NORMAL
BLOOD UNIT EXPIRATION DATE: NORMAL
BLOOD UNIT TYPE CODE: 6200
BLOOD UNIT TYPE CODE: 6200
BLOOD UNIT TYPE: NORMAL
BLOOD UNIT TYPE: NORMAL
CODING SYSTEM: NORMAL
CODING SYSTEM: NORMAL
DISPENSE STATUS: NORMAL
DISPENSE STATUS: NORMAL
TRANS ERYTHROCYTES VOL PATIENT: NORMAL ML
TRANS ERYTHROCYTES VOL PATIENT: NORMAL ML

## 2020-03-30 NOTE — PLAN OF CARE
Patient discharged home with no needs 3/28/2020.    Future Appointments   Date Time Provider Department Center   4/3/2020  9:00 AM MAXWELL Dove MD [facility] HEM ONC [facility]   4/14/2020  2:20 PM Silvio Man MD [facility] COLON [facility]          03/30/20 0805   Final Note   Assessment Type Final Discharge Note   Anticipated Discharge Disposition Home   Hospital Follow Up  Appt(s) scheduled? Yes   Right Care Referral Info   Post Acute Recommendation No Care   Post-Acute Status   Post-Acute Authorization Other   Other Status No Post-Acute Service Needs

## 2020-03-31 ENCOUNTER — PATIENT MESSAGE (OUTPATIENT)
Dept: SURGERY | Facility: CLINIC | Age: 50
End: 2020-03-31

## 2020-03-31 DIAGNOSIS — C18.7 MALIGNANT NEOPLASM OF SIGMOID COLON: ICD-10-CM

## 2020-03-31 DIAGNOSIS — C18.7 MALIGNANT NEOPLASM OF SIGMOID COLON: Primary | ICD-10-CM

## 2020-03-31 RX ORDER — SULFAMETHOXAZOLE AND TRIMETHOPRIM 800; 160 MG/1; MG/1
1 TABLET ORAL 2 TIMES DAILY
Qty: 14 TABLET | Refills: 0 | Status: SHIPPED | OUTPATIENT
Start: 2020-03-31 | End: 2020-04-07

## 2020-03-31 RX ORDER — SULFAMETHOXAZOLE AND TRIMETHOPRIM 800; 160 MG/1; MG/1
1 TABLET ORAL 2 TIMES DAILY
Qty: 14 TABLET | Refills: 0 | Status: SHIPPED | OUTPATIENT
Start: 2020-03-31 | End: 2020-03-31

## 2020-04-01 ENCOUNTER — TELEPHONE (OUTPATIENT)
Dept: GYNECOLOGIC ONCOLOGY | Facility: CLINIC | Age: 50
End: 2020-04-01

## 2020-04-01 ENCOUNTER — PATIENT MESSAGE (OUTPATIENT)
Dept: SURGERY | Facility: CLINIC | Age: 50
End: 2020-04-01

## 2020-04-02 ENCOUNTER — PATIENT MESSAGE (OUTPATIENT)
Dept: SURGERY | Facility: CLINIC | Age: 50
End: 2020-04-02

## 2020-04-03 ENCOUNTER — OFFICE VISIT (OUTPATIENT)
Dept: HEMATOLOGY/ONCOLOGY | Facility: CLINIC | Age: 50
End: 2020-04-03
Payer: COMMERCIAL

## 2020-04-03 ENCOUNTER — TELEPHONE (OUTPATIENT)
Dept: HEMATOLOGY/ONCOLOGY | Facility: CLINIC | Age: 50
End: 2020-04-03

## 2020-04-03 ENCOUNTER — PATIENT MESSAGE (OUTPATIENT)
Dept: SURGERY | Facility: CLINIC | Age: 50
End: 2020-04-03

## 2020-04-03 DIAGNOSIS — C77.2 METASTASIS TO INTESTINAL LYMPH NODE: ICD-10-CM

## 2020-04-03 DIAGNOSIS — C18.7 MALIGNANT NEOPLASM OF SIGMOID COLON: Primary | ICD-10-CM

## 2020-04-03 PROCEDURE — 99205 PR OFFICE/OUTPT VISIT, NEW, LEVL V, 60-74 MIN: ICD-10-PCS | Mod: 95,,, | Performed by: INTERNAL MEDICINE

## 2020-04-03 PROCEDURE — 99205 OFFICE O/P NEW HI 60 MIN: CPT | Mod: 95,,, | Performed by: INTERNAL MEDICINE

## 2020-04-03 RX ORDER — ONDANSETRON HYDROCHLORIDE 8 MG/1
8 TABLET, FILM COATED ORAL EVERY 8 HOURS PRN
Qty: 45 TABLET | Refills: 2 | Status: SHIPPED | OUTPATIENT
Start: 2020-04-03 | End: 2020-05-03

## 2020-04-03 RX ORDER — OXYCODONE HYDROCHLORIDE 10 MG/1
10 TABLET ORAL EVERY 4 HOURS PRN
Qty: 40 TABLET | Refills: 0 | Status: SHIPPED | OUTPATIENT
Start: 2020-04-03 | End: 2021-03-19

## 2020-04-03 NOTE — TELEPHONE ENCOUNTER
Spoke with patient. She understands she needs port know and to see dr morejon virtually in about 2 weeks. Chemo will be decided at a later date, as her resection was just last week.    Message routed to wale

## 2020-04-03 NOTE — TELEPHONE ENCOUNTER
Spoke with Ms. Mckinnon.  Patient would like to postpone Chemo Class for now.  States that she just had surgery.  Gave Ms. Mckinnon my number [phone]. Instructed her to call me when she's ready.      ----- Message from Jodi Sy sent at 4/3/2020  9:44 AM CDT -----  Hi,     PT needs to have a virtual chemo class. Can someone please assist with scheduling a visit?    Thank you!

## 2020-04-03 NOTE — TELEPHONE ENCOUNTER
----- Message from Jodi Sy sent at 4/3/2020  9:49 AM CDT -----  Regardin/3 pending chemo, port placement   Sent a message for chemo class    The patient will need port placed by IR.  Chemo education and dietician ordered.  Needs to start chemo around the  and requests the [facility] if possible ([facility]).  I will need cbc and cmp the day of chemo.  All orders have been placed.  Thank you

## 2020-04-03 NOTE — PROGRESS NOTES
The patient location is:  private residence  The chief complaint leading to consultation is:  Stage III colon cancer  Visit type: Virtual visit with synchronous audio and video  Total time spent with patient:  Approximately 60 min total  Each patient to whom he or she provides medical services by telemedicine is:  (1) informed of the relationship between the physician and patient and the respective role of any other health care provider with respect to management of the patient; and (2) notified that he or she may decline to receive medical services by telemedicine and may withdraw from such care at any time.    Notes:     Subjective:         Patient ID: Gail Mckninon is a 49 y.o. female.    Chief Complaint: Colon Cancer    49-year-old female who began having abdominal discomfort in June of 2019.  She was diagnosed with kidney stones and a CT scan showed some thickening in her colon.  She was then sent to see her PCP who recommended a colonoscopy.  The patient put this off until around January of 2020 when her PCP was adamant that she undergo further workup and she underwent colonoscopy in February of 2020.  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  The scans did not show obvious evidence of metastatic disease.  There was a small lesion in the liver that was concerning for a cyst in this will need to be followed closely.    Overall today she feels well.  She has healed from her surgery and she did have 37 lymph nodes removed of which 4 positive for cancer.  She does have some episodes of diarrhea but this is actually well controlled.  She denies nausea or vomiting.  Weight is relatively stable.  She denies a history of colon abnormalities or family history there of.  She denies any CNS type symptoms or bone  pain.  She denies chest pain or shortness of breath.  She denies melena, hematochezia, or red blood per rectum.    Review of Systems   Constitutional: Negative for activity change, appetite change, chills, diaphoresis, fatigue, fever and unexpected weight change.   HENT: Negative for facial swelling, mouth sores and sore throat.    Eyes: Negative for visual disturbance.   Respiratory: Negative for apnea, cough, choking and shortness of breath.    Cardiovascular: Negative for chest pain and leg swelling.   Gastrointestinal: Positive for abdominal pain (improved). Negative for abdominal distention, blood in stool, constipation, diarrhea, nausea, rectal pain and vomiting.   Endocrine: Negative for cold intolerance.   Genitourinary: Negative for difficulty urinating, frequency, hematuria, vaginal bleeding and vaginal discharge.   Musculoskeletal: Negative for back pain, joint swelling and neck pain.   Skin: Negative for color change, rash and wound.   Neurological: Negative for dizziness, weakness, numbness and headaches.   Hematological: Negative for adenopathy. Does not bruise/bleed easily.   Psychiatric/Behavioral: Negative for agitation, confusion, decreased concentration and hallucinations. The patient is nervous/anxious. The patient is not hyperactive.          Past Medical History:   Past Medical History:   Diagnosis Date    Anxiety     Depression     FH: ovarian cancer 3/16/2020    Hyperthyroidism     Hypothyroid     Kidney calculi     Malignant neoplasm of sigmoid colon 3/16/2020    Menorrhagia     Multinodular goiter 2012    Palpitation     Venous insufficiency         Past Surgical History:   Past Surgical History:   Procedure Laterality Date     SECTION, CLASSIC      x3    COLONOSCOPY N/A 2020    Procedure: COLONOSCOPY;  Surgeon: Shane Parker MD;  [facility];  Service: Endoscopy;  Laterality: N/A;    CYSTOSCOPY W/ URETERAL STENT PLACEMENT Bilateral 3/25/2020     Procedure: CYSTOSCOPY, WITH URETERAL STENT INSERTION;  Surgeon: Claudio Tyson MD;  [facility];  Service: Urology;  Laterality: Bilateral;    LAPAROSCOPIC SIGMOIDECTOMY N/A 3/25/2020    Procedure: COLECTOMY, SIGMOID, LAPAROSCOPIC, flex sig, ERAS high;  Surgeon: Silvio Man MD;  Location: [facility];  Service: Colon and Rectal;  Laterality: N/A;    MOBILIZATION OF SPLENIC FLEXURE  3/25/2020    Procedure: MOBILIZATION, SPLENIC FLEXURE;  Surgeon: Silvio Man MD;  [facility];  Service: Colon and Rectal;;    tonsillectomy      TOTAL ABDOMINAL HYSTERECTOMY W/ BILATERAL SALPINGOOPHORECTOMY N/A 3/25/2020    Procedure: HYSTERECTOMY, TOTAL, ABDOMINAL, WITH BILATERAL SALPINGO-OOPHORECTOMY;  Surgeon: Keron Brady MD;  [facility];  Service: Oncology;  Laterality: N/A;        Family History:   Family History   Problem Relation Age of Onset    Heart disease Father     Diabetes Mother 65    Cancer Maternal Aunt         lung cancer    Cancer Maternal Grandmother         stomach cancer- started in ovaries    Ovarian cancer Maternal Grandmother         stomach cancer- started in ovaries    Colon cancer Paternal Uncle     Cancer Paternal Uncle     Ovarian cancer Maternal Aunt     Ovarian cancer Maternal Aunt     Ovarian cancer Cousin         mother had ovarian cancer    Uterine cancer Neg Hx     Breast cancer Neg Hx         Social History:   Social History     Tobacco Use    Smoking status: Never Smoker    Smokeless tobacco: Never Used   Substance Use Topics    Alcohol use: Yes     Comment: occasionally- twice monthly        Allergies:   Review of patient's allergies indicates:   Allergen Reactions    Penicillin g      unknown    Penicillins Hives     childhood allergy        Medications:     Current Outpatient Medications:     acetaminophen (TYLENOL) 500 MG tablet, Take 2 tablets (1,000 mg total) by mouth every 8 (eight) hours., Disp: 60  tablet, Rfl: 0    ascorbic acid, vitamin C, (VITAMIN C) 1000 MG tablet, Take 1,000 mg by mouth once daily., Disp: , Rfl:     buPROPion (WELLBUTRIN SR) 150 MG TBSR 12 hr tablet, Take 1 tablet (150 mg total) by mouth 2 (two) times daily., Disp: 180 tablet, Rfl: 0    busPIRone (BUSPAR) 10 MG tablet, Take 1 tablet (10 mg total) by mouth 2 (two) times daily. (Patient taking differently: Take 10 mg by mouth 2 (two) times daily as needed. ), Disp: 180 tablet, Rfl: 0    cyclobenzaprine (FLEXERIL) 10 MG tablet, Take 1 tablet (10 mg total) by mouth nightly. (Patient taking differently: Take 10 mg by mouth nightly as needed. ), Disp: 30 tablet, Rfl: 0    levothyroxine (SYNTHROID) 200 MCG tablet, TAKE 1 TABLET BY MOUTH ONCE DAILY BEFORE BREAKFAST, Disp: 90 tablet, Rfl: 3    multivitamin (ONE DAILY MULTIVITAMIN) per tablet, Take 1 tablet by mouth once daily., Disp: , Rfl:     ondansetron (ZOFRAN) 4 MG tablet, Take 1 tablet (4 mg total) by mouth every 8 (eight) hours as needed for Nausea., Disp: 15 tablet, Rfl: 0    oxyCODONE (ROXICODONE) 10 mg Tab immediate release tablet, Take 1 tablet (10 mg total) by mouth every 4 (four) hours as needed., Disp: 30 tablet, Rfl: 0    sulfamethoxazole-trimethoprim 800-160mg (BACTRIM DS) 800-160 mg Tab, Take 1 tablet by mouth 2 (two) times daily. for 7 days, Disp: 14 tablet, Rfl: 0    traMADol (ULTRAM) 50 mg tablet, Take 1 tablet (50 mg total) by mouth 3 (three) times daily as needed for Pain., Disp: 90 tablet, Rfl: 2     Objective:      Physical Exam   Constitutional: She is oriented to person, place, and time. She appears well-developed and well-nourished. No distress.   HENT:   Head: Normocephalic and atraumatic.   Eyes: Pupils are equal, round, and reactive to light. EOM are normal.   Neck: Normal range of motion. No tracheal deviation present.   Pulmonary/Chest: Effort normal. No stridor. No respiratory distress.   Musculoskeletal: Normal range of motion. She exhibits no deformity.    Neurological: She is alert and oriented to person, place, and time. No cranial nerve deficit.   Skin: She is not diaphoretic.   Psychiatric: She has a normal mood and affect. Her behavior is normal. Judgment and thought content normal.       Labs have been reviewed.  Epic will not allow me to place them directly in this note.      Assessment:       1. Malignant neoplasm of sigmoid colon    2. 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      Plan:  1.  Stage III colon cancer:  I had a long discussion with the patient today we did review her previous imaging studies as well as labs and pathology.  I did talk to her about adjuvant chemotherapy with the attempt to reduce her risk of recurrence.  We did discuss fluorouracil and oxaliplatin and she is willing to proceed with this.  We did discuss risks versus benefits and possible side effects in depth.  I am going to send a referral to IR today for a chemo port placement.  I am going to place the orders for insurance authorization ask our dietitian and chemo educator to contact patient our hope is to start the week of the 13th of April.  I will also call her in [place] today.  The patient has appropriate questions which were answered to her apparent satisfaction.  Tentatively I will see her around the start of her chemotherapy.

## 2020-04-03 NOTE — Clinical Note
The patient will need port placed by IR.  Chemo education and dietician ordered.  Needs to start chemo around the 13th and requests the [facility] if possible (FOLFOX).  I will need cbc and cmp the day of chemo.  All orders have been placed.  Thank you

## 2020-04-03 NOTE — PLAN OF CARE
START ON PATHWAY REGIMEN - Colorectal    COS67        Oxaliplatin (Eloxatin)       Leucovorin       5-Fluorouracil       5-Fluorouracil           Additional Orders: **Note: order sheet contains two q14 day cycles**    **Always confirm dose/schedule in your pharmacy ordering system**    Patient Characteristics:  Postoperative without Neoadjuvant Therapy (Pathologic Staging), Colon, Stage   III, High Risk (pT4 or pN2)  Therapeutic Status: Postoperative without Neoadjuvant Therapy (Pathologic   Staging)  Tumor Location: Colon  AJCC M Category: cM0  AJCC T Category: pT4b  AJCC N Category: pN2a  AJCC 8 Stage Grouping: IIIC  Intent of Therapy:  Curative Intent, Discussed with Patient

## 2020-04-03 NOTE — TELEPHONE ENCOUNTER
----- Message from Jodi Sy sent at 4/3/2020  9:49 AM CDT -----  Regardin/3 pending chemo, port placement   Sent a message for chemo class    The patient will need port placed by IR.  Chemo education and dietician ordered.  Needs to start chemo around the  and requests the [facility] if possible (FOLFOX).  I will need cbc and cmp the day of chemo.  All orders have been placed.  Thank you

## 2020-04-03 NOTE — TELEPHONE ENCOUNTER
Spoke with patient. She states her resection was only last week. She does not feel healed enough to start chemo until possibly in May, as this is what her CRS MD told her.  Nurse informed patient she would contact her back after clarifying with dr morejon. She thanked nurse.      Message routed to dr morejon

## 2020-04-04 ENCOUNTER — PATIENT MESSAGE (OUTPATIENT)
Dept: SURGERY | Facility: CLINIC | Age: 50
End: 2020-04-04

## 2020-04-07 ENCOUNTER — PATIENT MESSAGE (OUTPATIENT)
Dept: HEMATOLOGY/ONCOLOGY | Facility: CLINIC | Age: 50
End: 2020-04-07

## 2020-04-07 ENCOUNTER — DOCUMENTATION ONLY (OUTPATIENT)
Dept: INFUSION THERAPY | Facility: HOSPITAL | Age: 50
End: 2020-04-07

## 2020-04-07 ENCOUNTER — OFFICE VISIT (OUTPATIENT)
Dept: SURGERY | Facility: CLINIC | Age: 50
End: 2020-04-07
Payer: COMMERCIAL

## 2020-04-07 ENCOUNTER — PATIENT MESSAGE (OUTPATIENT)
Dept: SURGERY | Facility: CLINIC | Age: 50
End: 2020-04-07

## 2020-04-07 VITALS
SYSTOLIC BLOOD PRESSURE: 113 MMHG | HEART RATE: 105 BPM | BODY MASS INDEX: 41.1 KG/M2 | WEIGHT: 255.75 LBS | DIASTOLIC BLOOD PRESSURE: 66 MMHG | HEIGHT: 66 IN

## 2020-04-07 DIAGNOSIS — C18.7 MALIGNANT NEOPLASM OF SIGMOID COLON: Primary | ICD-10-CM

## 2020-04-07 PROCEDURE — 99999 PR PBB SHADOW E&M-EST. PATIENT-LVL III: ICD-10-PCS | Mod: PBBFAC,,, | Performed by: COLON & RECTAL SURGERY

## 2020-04-07 PROCEDURE — 99999 PR PBB SHADOW E&M-EST. PATIENT-LVL III: CPT | Mod: PBBFAC,,, | Performed by: COLON & RECTAL SURGERY

## 2020-04-07 PROCEDURE — 99024 POSTOP FOLLOW-UP VISIT: CPT | Mod: S$GLB,,, | Performed by: COLON & RECTAL SURGERY

## 2020-04-07 PROCEDURE — 99024 PR POST-OP FOLLOW-UP VISIT: ICD-10-PCS | Mod: S$GLB,,, | Performed by: COLON & RECTAL SURGERY

## 2020-04-07 NOTE — PROGRESS NOTES
CRS Office Post Operative Visit    SUBJECTIVE:     Chief Complaint: followup from surgery.     Procedure:   3/25/20:   1. Laparoscopic sigmoid colectomy  2. Laparoscopic mobilization of the splenic flexure  3. Flexible sigmoidoscopy.    4. EN bloc hysterectomy and bilateral salpingo oophorectomy performed by Dr. Brady with GYN On[facility]     Indication: locally advanced sigmoid colon cancer    Staging:  CT chest: no metastatic disease  CT abd/pelvis: Worrisome interval detrimental change with interval increase in irregular nodular concentric mural thickening involving the sigmoid colon over an approximately 10 cm length with adjacent regional mesenteric lymphadenopathy observed..  There are 2 well-circumscribed hypodensities in proximity to the patient's colonic mass, possibly originating from the left ovary.  The colonic mass is intimately associated with the left aspect of the uterine fundus.   MRI pelvis: masslike thickening of wall of the sigmoid colon with surrounding adenopathy, left adnexal/ovarian cysts and small amount of fluid within the left side of the pelvis/lower left pericolic gutter.  CEA = 1.3     Significant post operative events: did well     Pathology: INVASIVE POORLY DIFFERENTIAL, FOCALLY MUCIN PRODUCING ADENOCARCINOMA OF THE  COLON WITH DIRECT EXTENSION INTO THE LEFT OVARY. SEE SYNOPTIC REPORT:  PROCEDURE: SIGMOID RESECTION AND TOTAL HYSTERECTOMY AND BILATERAL SALPINGOOOPHORECTOMY  TUMOR SITE: SIGMOID COLON  TUMOR SIZE: 9.4 CM  MACROSCOPIC TUMOR PERFORATION: TUMOR PERFORATES INTO THE LEFT ADNEXA  HISTOLOGIC TYPE: ADENOCARCINOMA WITH FOCAL MUCIN PRODUCTION  HISTOLOGIC GRADE: GRADE 3 (POORLY DIFFERENTIATED)  TUMOR EXTENSION: TUMOR DIRECTLY INVADES INTO THE LEFT OVARY  MARGINS: ALL MARGINS OF RESECTION OF THIS SPECIMEN ARE NEGATIVE FOR  ADENOCARCINOMA (PROXIMAL, DISTAL AND CIRCUMFERENTIAL/RADIAL). THE CLOSEST MARGIN  IS THE RADIAL MARGIN AT 6 MM (SLIDE D)  TREATMENT EFFECT: NO KNOWN PRESURGICAL  "THERAPY  LYMPH-VASCULAR INVASION: NOT IDENTIFIED  PERINEURAL INVASION: NOT IDENTIFIED  TUMOR DEPOSITS: NOT IDENTIFIED  REGIONAL LYMPH NODES: 4 OUT OF 37 (4/37) LYMPH NODES SHOW METASTATIC  ADENOCARCINOMA  SPECIAL STUDIES: IMMUNOSTAINS FOR MMR ARE PENDING WITH REPORT TO FOLLOW  TUMOR STAGE: pT4b N2a    Current Status:  Presents today to clinic for evaluation for erythema and swelling with scant drainage from her lower midline incision.  No fevers or chills.  Previously was on antibiotics for 7 days with slight improvement.  Lower part of the incision was initially erythematous but has since healed well.  Now presents with drainage and erythema from the upper part of the incision.    Review of Systems:  Review of Systems   Constitutional: Negative for chills, diaphoresis, fever, malaise/fatigue and weight loss.   HENT: Negative for congestion.    Respiratory: Negative for shortness of breath.    Cardiovascular: Negative for chest pain and leg swelling.   Gastrointestinal: Negative for abdominal pain, blood in stool, constipation, nausea and vomiting.   Genitourinary: Negative for dysuria.   Musculoskeletal: Negative for back pain and myalgias.   Skin: Positive for rash.   Neurological: Negative for dizziness and weakness.   Endo/Heme/Allergies: Does not bruise/bleed easily.   Psychiatric/Behavioral: Negative for depression.       OBJECTIVE:     Vital Signs (Most Recent)  /66 (BP Location: Right arm, Patient Position: Sitting, BP Method: Medium (Automatic)) Comment: 113 66  Pulse 105   Ht 5' 6" (1.676 m)   Wt 116 kg (255 lb 11.7 oz)   BMI 41.28 kg/m²     Physical Exam:  General: White female in no distress   Respiratory: respirations are even and unlabored  Cardiac: regular rate  Abdomen:  Lower midline incision with erythema on the superior aspect.  Lower aspect as well approximated with no erythema or induration.  Slight induration on the upper aspect.  Wound was opened in 2 areas and probed.  No " significant drainage.  Extremities: Warm dry and intact  Anorectal:  Deferred    ASSESSMENT/PLAN:     Diagnoses and all orders for this visit:    Malignant neoplasm of sigmoid colon        50 y.o. female post op from laparoscopic assisted sigmoid colectomy with en bloc hysterectomy and oophorectomy for T4bN2a stage IIIC colon cancer who presents with low-grade superficial wound infection.  Antibiotics were given today.  I will see her back in a week for interval wound check.  Her wound infection will need to resolve prior to initiation of chemotherapy.      THOMAS Man MD, FACS  Staff Surgeon  Colon & Rectal Surgery

## 2020-04-07 NOTE — PROGRESS NOTES
[10:48 AM] Jodi Sy   can you help move appointments for Gail ([id number]) to early May      [10:51 AM] Connie Stapleton  mrn [id number] 05/05/20 @9:30

## 2020-04-08 ENCOUNTER — PATIENT MESSAGE (OUTPATIENT)
Dept: HEMATOLOGY/ONCOLOGY | Facility: CLINIC | Age: 50
End: 2020-04-08

## 2020-04-08 ENCOUNTER — TELEPHONE (OUTPATIENT)
Dept: INTERVENTIONAL RADIOLOGY/VASCULAR | Facility: CLINIC | Age: 50
End: 2020-04-08

## 2020-04-08 ENCOUNTER — DOCUMENTATION ONLY (OUTPATIENT)
Dept: INFUSION THERAPY | Facility: HOSPITAL | Age: 50
End: 2020-04-08

## 2020-04-08 DIAGNOSIS — C18.7 MALIGNANT NEOPLASM OF SIGMOID COLON: Primary | ICD-10-CM

## 2020-04-08 LAB — COLARIS: NORMAL

## 2020-04-08 NOTE — PROGRESS NOTES
[1:40 PM] Jodi Mckinnon ([id number]) would like chemo moved to Wed & Fri. Can you help to reschedule to 5/6 and 5/8?    ?[1:42 PM] Connie Stapleton    ok is reschedule

## 2020-04-08 NOTE — TELEPHONE ENCOUNTER
Please reschedule labs, chemo to wed and Friday instead, of the same week, the 6th and 8th.  ~kong

## 2020-04-09 ENCOUNTER — PATIENT MESSAGE (OUTPATIENT)
Dept: FAMILY MEDICINE | Facility: CLINIC | Age: 50
End: 2020-04-09

## 2020-04-13 ENCOUNTER — PATIENT OUTREACH (OUTPATIENT)
Dept: ADMINISTRATIVE | Facility: OTHER | Age: 50
End: 2020-04-13

## 2020-04-13 ENCOUNTER — TELEPHONE (OUTPATIENT)
Dept: HEMATOLOGY/ONCOLOGY | Facility: CLINIC | Age: 50
End: 2020-04-13

## 2020-04-14 ENCOUNTER — PATIENT MESSAGE (OUTPATIENT)
Dept: SURGERY | Facility: CLINIC | Age: 50
End: 2020-04-14

## 2020-04-14 ENCOUNTER — OFFICE VISIT (OUTPATIENT)
Dept: SURGERY | Facility: CLINIC | Age: 50
End: 2020-04-14
Payer: COMMERCIAL

## 2020-04-14 ENCOUNTER — OFFICE VISIT (OUTPATIENT)
Dept: HEMATOLOGY/ONCOLOGY | Facility: CLINIC | Age: 50
End: 2020-04-14
Payer: COMMERCIAL

## 2020-04-14 DIAGNOSIS — C77.2 METASTASIS TO INTESTINAL LYMPH NODE: ICD-10-CM

## 2020-04-14 DIAGNOSIS — C18.7 MALIGNANT NEOPLASM OF SIGMOID COLON: Primary | ICD-10-CM

## 2020-04-14 PROCEDURE — 99024 PR POST-OP FOLLOW-UP VISIT: ICD-10-PCS | Mod: 95,,, | Performed by: COLON & RECTAL SURGERY

## 2020-04-14 PROCEDURE — 99213 OFFICE O/P EST LOW 20 MIN: CPT | Mod: 95,,, | Performed by: NURSE PRACTITIONER

## 2020-04-14 PROCEDURE — 99024 POSTOP FOLLOW-UP VISIT: CPT | Mod: 95,,, | Performed by: COLON & RECTAL SURGERY

## 2020-04-14 PROCEDURE — 99213 PR OFFICE/OUTPT VISIT, EST, LEVL III, 20-29 MIN: ICD-10-PCS | Mod: 95,,, | Performed by: NURSE PRACTITIONER

## 2020-04-14 RX ORDER — LIDOCAINE AND PRILOCAINE 25; 25 MG/G; MG/G
CREAM TOPICAL
Qty: 25 G | Refills: 0 | Status: SHIPPED | OUTPATIENT
Start: 2020-04-14 | End: 2021-06-02 | Stop reason: SDUPTHER

## 2020-04-14 NOTE — PROGRESS NOTES
Subjective:    The patient location is: Home  The chief complaint leading to consultation is: Chemotherapy education  Visit type: Virtual visit with synchronous audio and video  Total time spent with patient: 35 minutes  Each patient to whom he or she provides medical services by telemedicine is:  (1) informed of the relationship between the physician and patient and the respective role of any other health care provider with respect to management of the patient; and (2) notified that he or she may decline to receive medical services by telemedicine and may withdraw from such care at any time.     Patient ID: Gail Mckinnon is a 50 y.o. female.    Chief Complaint: Chemotherapy education    HPI  This is a 50 year old white female known to Dr. Dove for a recent diagnosis of Stage III Adenocarcinoma of the sigmoid colon with 4/37 lymph nodes found positive (T4b, N2a, M0).  She recently underwent a Sigmoid colectomy & MARCIAL/BSO on 03/25/2020.  She did have difficulties with post surgical wound infection and treated with antibiotics.  She denies any recent fevers, chills, abdominal discomfort, nausea, vomiting, diarrhea, bleeding, etc.  Her pain management has decreased with the need for only narcotic pain medication twice a day.  She had some difficulties with constipation and used Miralax for assistance.  She states she has been emotional every day as she is apprehensive of the unknown.  She has a strong miah and reports that she has a friend who went through chemotherapy for colon cancer that provides wonderful support.  Her daughter, 21 and son live in the house with her.  She states she has to work through chemo due to finances.  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  No other new complaints or pertinent findings on a 14 point review of systems.     Malignant neoplasm of sigmoid colon    3/16/2020 Initial Diagnosis     Malignant neoplasm of sigmoid colon      3/31/2020 Cancer  Staged     Staging form: Colon and Rectum, AJCC 8th Edition  - Clinical stage from 3/31/2020: Stage IIIC (cT4b, cN2a, cM0)      4/13/2020 -  Chemotherapy     Treatment Summary   Plan Name: OP FOLFOX 6 Q2W  Treatment Goal: Curative  Status: Active  Start Date: 4/13/2020 (Planned)  End Date: 9/16/2020 (Planned)  Provider: MAXWELL Dove MD  Chemotherapy: fluorouraciL injection 945 mg, 400 mg/m2, Intravenous, Clinic/HOD 1 time, 0 of 12 cycles  fluorouracil (ADRUCIL) 2,400 mg/m2 = 5,665 mg in sodium chloride 0.9% 213.3 mL chemo infusion, 2,400 mg/m2, Intravenous, Over 46 hours, 0 of 12 cycles  leucovorin calcium 400 mg/m2 = 945 mg in dextrose 5 % 250 mL infusion, 400 mg/m2, Intravenous, Clinic/HOD 1 time, 0 of 12 cycles  oxaliplatin (ELOXATIN) 85 mg/m2 = 201 mg in dextrose 5 % 500 mL chemo infusion, 85 mg/m2, Intravenous, Clinic/HOD 1 time, 0 of 12 cycles        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    4/3/2020 Initial Diagnosis     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      4/13/2020 -  Chemotherapy     Treatment Summary   Plan Name: OP FOLFOX 6 Q2W  Treatment Goal: Curative  Status: Active  Start Date: 4/13/2020 (Planned)  End Date: 9/16/2020 (Planned)  Provider: MAXWELL Dove MD  Chemotherapy: fluorouraciL injection 945 mg, 400 mg/m2, Intravenous, Clinic/HOD 1 time, 0 of 12 cycles  fluorouracil (ADRUCIL) 2,400 mg/m2 = 5,665 mg in sodium chloride 0.9% 213.3 mL chemo infusion, 2,400 mg/m2, Intravenous, Over 46 hours, 0 of 12 cycles  leucovorin calcium 400 mg/m2 = 945 mg in dextrose 5 % 250 mL infusion, 400 mg/m2, Intravenous, Clinic/HOD 1 time, 0 of 12 cycles  oxaliplatin (ELOXATIN) 85 mg/m2 = 201 mg in dextrose 5 % 500 mL chemo infusion, 85 mg/m2, Intravenous, Clinic/HOD 1 time, 0 of 12 cycles           Review of Systems   Constitutional: Negative for activity change and appetite change.        Recent surgery   Psychiatric/Behavioral: The patient is nervous/anxious.    All other systems reviewed and are  negative.      Objective:      Physical Exam   Constitutional: She appears well-developed and well-nourished.   HENT:   Head: Normocephalic and atraumatic.   Mouth/Throat: Oropharynx is clear and moist.   Eyes: Conjunctivae are normal.   Neck: Normal range of motion.   Pulmonary/Chest: Effort normal.   Psychiatric: Her speech is normal and behavior is normal. Judgment and thought content normal. Her mood appears anxious. Cognition and memory are normal. She exhibits a depressed mood.       Assessment:       1. Malignant neoplasm of sigmoid colon    2. 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  Treatment plan of FOLFOX every 2 weeks x 12 cycles discussed with patient.  2.  Handouts provided from [facility] on chemotherapy agents via email.    3.  Discussed the mechanism of action, potential side effects of this treatment as well as ways to manage them at home. Some of these side effects include but not limited to fever, nausea, vomiting, decreased appetite, fatigue, weakness, cytopenias, myalgia/arthralgia, constipation, diarrhea, bleeding, headache, nail changes, taste change, hair thinning/loss, peripheral neuropathy, cold dysthesia, mouth sores, Hand/foot syndrome.  4.  Dietary modifications discussed.  Detail instructions to be provided by dietician.   5.  Chemotherapy Binder to be supplied with contact information on Cycle 1, Day 1 of treatment.   6.  Discussed follow-up with the physician for toxicity monitoring throughout treatment.    7.  Scripts were escribed (Zofran) prior by Dr. Dove.  EMLA cream escribed & Magic Mouthwash called in.  Explained for home use.    8.  COVID-19 precautions reinforced.  9.  The patient verbalized understanding. All questions were answered.  10.  Informed consent to be obtained on Cycle 1, Day 1.     Assessment/plan reviewed and approved by Dr. Dove.

## 2020-04-14 NOTE — PROGRESS NOTES
CRS Office Post Operative Visit with telemedicine  The patient location is:  home  The chief complaint leading to consultation is:  Follow-up from surgery  Visit type: audiovisual  Total time spent with patient:  10 min  Each patient to whom he or she provides medical services by telemedicine is:  (1) informed of the relationship between the physician and patient and the respective role of any other health care provider with respect to management of the patient; and (2) notified that he or she may decline to receive medical services by telemedicine and may withdraw from such care at any time.      SUBJECTIVE:     Chief Complaint: followup from surgery.     Procedure:   3/25/20:   1. Laparoscopic sigmoid colectomy  2. Laparoscopic mobilization of the splenic flexure  3. Flexible sigmoidoscopy.    4. EN bloc hysterectomy and bilateral salpingo oophorectomy performed by Dr. Brady with [facility]     Indication: locally advanced sigmoid colon cancer    Staging:  CT chest: no metastatic disease  CT abd/pelvis: Worrisome interval detrimental change with interval increase in irregular nodular concentric mural thickening involving the sigmoid colon over an approximately 10 cm length with adjacent regional mesenteric lymphadenopathy observed..  There are 2 well-circumscribed hypodensities in proximity to the patient's colonic mass, possibly originating from the left ovary.  The colonic mass is intimately associated with the left aspect of the uterine fundus.   MRI pelvis: masslike thickening of wall of the sigmoid colon with surrounding adenopathy, left adnexal/ovarian cysts and small amount of fluid within the left side of the pelvis/lower left pericolic gutter.  CEA = 1.3     Significant post operative events: did well     Pathology: INVASIVE POORLY DIFFERENTIAL, FOCALLY MUCIN PRODUCING ADENOCARCINOMA OF THE  COLON WITH DIRECT EXTENSION INTO THE LEFT OVARY. SEE SYNOPTIC REPORT:  PROCEDURE: SIGMOID RESECTION AND TOTAL  HYSTERECTOMY AND BILATERAL SALPINGOOOPHORECTOMY  TUMOR SITE: SIGMOID COLON  TUMOR SIZE: 9.4 CM  MACROSCOPIC TUMOR PERFORATION: TUMOR PERFORATES INTO THE LEFT ADNEXA  HISTOLOGIC TYPE: ADENOCARCINOMA WITH FOCAL MUCIN PRODUCTION  HISTOLOGIC GRADE: GRADE 3 (POORLY DIFFERENTIATED)  TUMOR EXTENSION: TUMOR DIRECTLY INVADES INTO THE LEFT OVARY  MARGINS: ALL MARGINS OF RESECTION OF THIS SPECIMEN ARE NEGATIVE FOR  ADENOCARCINOMA (PROXIMAL, DISTAL AND CIRCUMFERENTIAL/RADIAL). THE CLOSEST MARGIN  IS THE RADIAL MARGIN AT 6 MM (SLIDE D)  TREATMENT EFFECT: NO KNOWN PRESURGICAL THERAPY  LYMPH-VASCULAR INVASION: NOT IDENTIFIED  PERINEURAL INVASION: NOT IDENTIFIED  TUMOR DEPOSITS: NOT IDENTIFIED  REGIONAL LYMPH NODES: 4 OUT OF 37 (4/37) LYMPH NODES SHOW METASTATIC  ADENOCARCINOMA  SPECIAL STUDIES: IMMUNOSTAINS FOR MMR ARE PENDING WITH REPORT TO FOLLOW  TUMOR STAGE: pT4b N2a    Current Status:    4/7/20: Presents today to clinic for evaluation for erythema and swelling with scant drainage from her lower midline incision.  No fevers or chills.  Previously was on antibiotics for 7 days with slight improvement.  Lower part of the incision was initially erythematous but has since healed well.  Now presents with drainage and erythema from the upper part of the incision.  4/14/20:  Presents for wound evaluation.  Doing well.  Has a scant amount of drainage from the superior and inferior aspect of the wound that is clear colored.  Wears an ABD pad and change the ABD pad 3 to 4 times a day.  ABD pad has approximately a silver dollar size amount of clear drainage on it.  No foul odor.  No purulence.    Review of Systems:  Review of Systems   Constitutional: Negative for chills, diaphoresis, fever, malaise/fatigue and weight loss.   HENT: Negative for congestion.    Respiratory: Negative for shortness of breath.    Cardiovascular: Negative for chest pain and leg swelling.   Gastrointestinal: Negative for abdominal pain, blood in stool,  constipation, nausea and vomiting.   Genitourinary: Negative for dysuria.   Musculoskeletal: Negative for back pain and myalgias.   Skin: Positive for rash.   Neurological: Negative for dizziness and weakness.   Endo/Heme/Allergies: Does not bruise/bleed easily.   Psychiatric/Behavioral: Negative for depression.       OBJECTIVE:     Vital Signs (Most Recent)  There were no vitals taken for this visit. ( no vital taken.  Telemedicine visit)    Physical Exam:  General: White female in no distress   Respiratory: respirations are even and unlabored  Cardiac:  Not examined  Abdomen:  Photographs was sent.  Incision appears to have decreased erythema.  Slight bollous appearing nature at the superior and inferior aspect of the wound with clear drainage.  Extremities: Warm dry and intact  Anorectal:  Deferred    ASSESSMENT/PLAN:     Diagnoses and all orders for this visit:    Malignant neoplasm of sigmoid colon        50 y.o. female post op from laparoscopic assisted sigmoid colectomy with en bloc hysterectomy and oophorectomy for T4bN2a stage IIIC colon cancer who presents with low-grade superficial wound infection.  This has responded well to antibiotics.  Based on her photograph today, she appears to have a seroma that is draining.  Overall, she appears to be healing well.  Discussed port placement and need for chemotherapy.  I will follow up with her in 2 weeks for a repeat visit prior to initiation of her chemotherapy    THOMAS Man MD, FACS  Staff Surgeon  Colon & Rectal Surgery

## 2020-04-15 ENCOUNTER — PATIENT MESSAGE (OUTPATIENT)
Dept: SURGERY | Facility: CLINIC | Age: 50
End: 2020-04-15

## 2020-04-16 NOTE — PROGRESS NOTES
"Oncology Nutrition Assessment for Medical Nutrition Therapy  Initial Visit    Gail cMkinnon   1970    Referring Provider:  MAXWELL Dove * MD     Reason for Visit: Pt in for education and nutrition counseling     PMHx:   Past Medical History:   Diagnosis Date    Anxiety     Depression     FH: ovarian cancer 3/16/2020    Hyperthyroidism     Hypothyroid     Kidney calculi     Malignant neoplasm of sigmoid colon 3/16/2020    Menorrhagia     Multinodular goiter 8/24/2012    Palpitation     Venous insufficiency        Nutrition Assessment    The patient location is: home   The chief complaint leading to consultation is: newly diagnosed colon cancer   Visit type: audiovisual  Total time spent with patient: 20 minutes  Each patient to whom he or she provides medical services by telemedicine is:  (1) informed of the relationship between the physician and patient and the respective role of any other health care provider with respect to management of the patient; and (2) notified that he or she may decline to receive medical services by telemedicine and may withdraw from such care at any time.    This is a 50 y.o.female here for a medical diagnosis of Malignant neoplasm of sigmoid colon s/p resection 3 weeks ago. Planned for adjuvant treatment with FOLFOX.   Reports a lot of nausea this week. Taking Zofran as needed. Appetite comes and goes. Previously had a lot of constipation. Now taking less pain medication so this is improving. Taking Marlyn Lax as needed. Also tolerating more high fiber foods.   Patient reports her usual weight used to be 298lb. She started intermittent fasting (16:8) and lost about 30lb prior to diagnosis. Was 268lb around time of surgery.      Weight:115.7 kg (255 lb)  Height:5' 6" (1.676 m)  BMI:Body mass index is 41.16 kg/m².   IBW: Ideal body weight: 59.3 kg (130 lb 11.7 oz)  Adjusted ideal body weight: 81.8 kg (180 lb 7 oz)    Usual BW: 268lb before surgery, 298lb " before intermittent fasting   Weight Change: about 13lb weight loss since surgery; about 40lb total     Nutrition focused physical findings: unable to fully assess    Allergies: Penicillin g and Penicillins    Current Medications:    Current Outpatient Medications:     acetaminophen (TYLENOL) 500 MG tablet, Take 2 tablets (1,000 mg total) by mouth every 8 (eight) hours., Disp: 60 tablet, Rfl: 0    ascorbic acid, vitamin C, (VITAMIN C) 1000 MG tablet, Take 1,000 mg by mouth once daily., Disp: , Rfl:     buPROPion (WELLBUTRIN SR) 150 MG TBSR 12 hr tablet, Take 1 tablet (150 mg total) by mouth 2 (two) times daily., Disp: 180 tablet, Rfl: 0    busPIRone (BUSPAR) 10 MG tablet, Take 1 tablet (10 mg total) by mouth 2 (two) times daily. (Patient not taking: Reported on 4/14/2020), Disp: 180 tablet, Rfl: 0    cyclobenzaprine (FLEXERIL) 10 MG tablet, Take 1 tablet (10 mg total) by mouth nightly. (Patient not taking: Reported on 4/7/2020), Disp: 30 tablet, Rfl: 0    levothyroxine (SYNTHROID) 200 MCG tablet, TAKE 1 TABLET BY MOUTH ONCE DAILY BEFORE BREAKFAST, Disp: 90 tablet, Rfl: 3    lidocaine-prilocaine (EMLA) cream, Apply topically as needed., Disp: 25 g, Rfl: 0    multivitamin (ONE DAILY MULTIVITAMIN) per tablet, Take 1 tablet by mouth once daily., Disp: , Rfl:     ondansetron (ZOFRAN) 4 MG tablet, Take 1 tablet (4 mg total) by mouth every 8 (eight) hours as needed for Nausea. (Patient not taking: Reported on 4/14/2020), Disp: 15 tablet, Rfl: 0    ondansetron (ZOFRAN) 8 MG tablet, Take 1 tablet (8 mg total) by mouth every 8 (eight) hours as needed for Nausea. (Patient not taking: Reported on 4/14/2020), Disp: 45 tablet, Rfl: 2    oxyCODONE (ROXICODONE) 10 mg Tab immediate release tablet, Take 1 tablet (10 mg total) by mouth every 4 (four) hours as needed., Disp: 40 tablet, Rfl: 0    traMADol (ULTRAM) 50 mg tablet, Take 1 tablet (50 mg total) by mouth 3 (three) times daily as needed for Pain. (Patient not  taking: Reported on 4/14/2020), Disp: 90 tablet, Rfl: 2    Food/medication interactions noted: avoid vitamin/mineral supplements within 4 hours of synthroid     Vitamins/Supplements: see medication list -recommended discontinuing vitamin C    Labs: Reviewed from March. No recent labs available.     Nutrition Diagnosis    Problem: inadequate protein/energy intake  Etiology (related to): altered GI function, appetite loss, nausea   Signs/Symptoms (as evidenced by): weight loss    Nutrition Intervention    Nutrition Prescription   2300 Kcals (20kcal/kg)  115 g protein (1g/kg)   7245-9286 mL fluid (20-25mL/kg)    Recommendations:  Small, frequent bland meal until nausea improves   Discontinue antioxidant supplements before chemotherapy  2-3L fluids daily   Avoid weight loss over 2lb per week   Avoid cold food/drinks for about 5 days after chemotherapy infusions   Follow food safety guidelines     Patient has decided to hold off on further intermittent fasting until she can see how she tolerates chemotherapy     Nutrition Monitoring and Evaluation    Monitor: diet education needs     Goals: maintain weight or slow loss of 1-2lb per week (or as advised by physician)     Motivation to change/anticipated barriers: good motivation and no perceived barriers     Follow up Patient provided with dietitian contact number and advised to call with questions or make future appointment if further intervention is needed.    Communication to referring provider/care team: note available in chart     Counseling time: 15 Minutes    Antonieta Mayfield, MPH, RD, , LDN, FAND   [phone]                                                                                                                                                                                                                                                                                  Answers for HPI/ROS submitted by the patient on 4/16/2020   appetite change :  No  unexpected weight change: No  visual disturbance: No  cough: No  shortness of breath: No  chest pain: No  abdominal pain: No  diarrhea: No  frequency: No  back pain: No  rash: No  headaches: No  adenopathy: No  nervous/ anxious: No

## 2020-04-17 ENCOUNTER — CLINICAL SUPPORT (OUTPATIENT)
Dept: HEMATOLOGY/ONCOLOGY | Facility: CLINIC | Age: 50
End: 2020-04-17
Payer: COMMERCIAL

## 2020-04-17 ENCOUNTER — PATIENT MESSAGE (OUTPATIENT)
Dept: HEMATOLOGY/ONCOLOGY | Facility: CLINIC | Age: 50
End: 2020-04-17

## 2020-04-17 VITALS — BODY MASS INDEX: 40.98 KG/M2 | HEIGHT: 66 IN | WEIGHT: 255 LBS

## 2020-04-17 DIAGNOSIS — Z71.3 NUTRITIONAL COUNSELING: Primary | ICD-10-CM

## 2020-04-17 DIAGNOSIS — C77.2 METASTASIS TO INTESTINAL LYMPH NODE: ICD-10-CM

## 2020-04-17 DIAGNOSIS — R63.4 WEIGHT LOSS: ICD-10-CM

## 2020-04-17 DIAGNOSIS — C18.7 MALIGNANT NEOPLASM OF SIGMOID COLON: ICD-10-CM

## 2020-04-17 PROCEDURE — 97802 PR MED NUTR THER, 1ST, INDIV, EA 15 MIN: ICD-10-PCS | Mod: 95,,, | Performed by: DIETITIAN, REGISTERED

## 2020-04-17 PROCEDURE — 97802 MEDICAL NUTRITION INDIV IN: CPT | Mod: 95,,, | Performed by: DIETITIAN, REGISTERED

## 2020-04-17 NOTE — PATIENT INSTRUCTIONS
Small, frequent bland meal until nausea improves   Discontinue antioxidant supplements before chemotherapy  2-3L fluids daily   Avoid weight loss over 2lb per week   Avoid cold food/drinks for about 5 days after chemotherapy infusions   Follow provided food safety guidelines

## 2020-04-21 ENCOUNTER — PATIENT MESSAGE (OUTPATIENT)
Dept: SURGERY | Facility: CLINIC | Age: 50
End: 2020-04-21

## 2020-04-21 ENCOUNTER — PATIENT MESSAGE (OUTPATIENT)
Dept: HEMATOLOGY/ONCOLOGY | Facility: CLINIC | Age: 50
End: 2020-04-21

## 2020-04-22 ENCOUNTER — PATIENT MESSAGE (OUTPATIENT)
Dept: HEMATOLOGY/ONCOLOGY | Facility: CLINIC | Age: 50
End: 2020-04-22

## 2020-04-27 ENCOUNTER — PATIENT OUTREACH (OUTPATIENT)
Dept: ADMINISTRATIVE | Facility: OTHER | Age: 50
End: 2020-04-27

## 2020-04-28 ENCOUNTER — PATIENT MESSAGE (OUTPATIENT)
Dept: HEMATOLOGY/ONCOLOGY | Facility: CLINIC | Age: 50
End: 2020-04-28

## 2020-04-28 ENCOUNTER — TELEPHONE (OUTPATIENT)
Dept: HEMATOLOGY/ONCOLOGY | Facility: CLINIC | Age: 50
End: 2020-04-28

## 2020-04-28 ENCOUNTER — PATIENT MESSAGE (OUTPATIENT)
Dept: SURGERY | Facility: CLINIC | Age: 50
End: 2020-04-28

## 2020-04-28 ENCOUNTER — OFFICE VISIT (OUTPATIENT)
Dept: SURGERY | Facility: CLINIC | Age: 50
End: 2020-04-28
Payer: COMMERCIAL

## 2020-04-28 DIAGNOSIS — C18.7 MALIGNANT NEOPLASM OF SIGMOID COLON: Primary | ICD-10-CM

## 2020-04-28 DIAGNOSIS — U07.1 COVID-19: Primary | ICD-10-CM

## 2020-04-28 PROCEDURE — 99024 POSTOP FOLLOW-UP VISIT: CPT | Mod: 95,,, | Performed by: COLON & RECTAL SURGERY

## 2020-04-28 PROCEDURE — 99024 PR POST-OP FOLLOW-UP VISIT: ICD-10-PCS | Mod: 95,,, | Performed by: COLON & RECTAL SURGERY

## 2020-04-28 NOTE — TELEPHONE ENCOUNTER
Spoke with patient to update on labs/covid testing, appt with Dr. Dove and treatment.  Patient verbalized understanding.

## 2020-04-28 NOTE — PROGRESS NOTES
CRS Office Post Operative Visit with telemedicine  The patient location is:  home  The chief complaint leading to consultation is:  Follow-up from surgery  Visit type: audiovisual  Total time spent with patient:  10 min  Each patient to whom he or she provides medical services by telemedicine is:  (1) informed of the relationship between the physician and patient and the respective role of any other health care provider with respect to management of the patient; and (2) notified that he or she may decline to receive medical services by telemedicine and may withdraw from such care at any time.      SUBJECTIVE:     Chief Complaint: followup from surgery.     Procedure:   3/25/20:   1. Laparoscopic sigmoid colectomy  2. Laparoscopic mobilization of the splenic flexure  3. Flexible sigmoidoscopy.    4. EN bloc hysterectomy and bilateral salpingo oophorectomy performed by Dr. Brady with [facility]     Indication: locally advanced sigmoid colon cancer    Staging:  CT chest: no metastatic disease  CT abd/pelvis: Worrisome interval detrimental change with interval increase in irregular nodular concentric mural thickening involving the sigmoid colon over an approximately 10 cm length with adjacent regional mesenteric lymphadenopathy observed..  There are 2 well-circumscribed hypodensities in proximity to the patient's colonic mass, possibly originating from the left ovary.  The colonic mass is intimately associated with the left aspect of the uterine fundus.   MRI pelvis: masslike thickening of wall of the sigmoid colon with surrounding adenopathy, left adnexal/ovarian cysts and small amount of fluid within the left side of the pelvis/lower left pericolic gutter.  CEA = 1.3     Significant post operative events: did well     Pathology: INVASIVE POORLY DIFFERENTIAL, FOCALLY MUCIN PRODUCING ADENOCARCINOMA OF THE  COLON WITH DIRECT EXTENSION INTO THE LEFT OVARY. SEE SYNOPTIC REPORT:  PROCEDURE: SIGMOID RESECTION AND TOTAL  HYSTERECTOMY AND BILATERAL SALPINGOOOPHORECTOMY  TUMOR SITE: SIGMOID COLON  TUMOR SIZE: 9.4 CM  MACROSCOPIC TUMOR PERFORATION: TUMOR PERFORATES INTO THE LEFT ADNEXA  HISTOLOGIC TYPE: ADENOCARCINOMA WITH FOCAL MUCIN PRODUCTION  HISTOLOGIC GRADE: GRADE 3 (POORLY DIFFERENTIATED)  TUMOR EXTENSION: TUMOR DIRECTLY INVADES INTO THE LEFT OVARY  MARGINS: ALL MARGINS OF RESECTION OF THIS SPECIMEN ARE NEGATIVE FOR  ADENOCARCINOMA (PROXIMAL, DISTAL AND CIRCUMFERENTIAL/RADIAL). THE CLOSEST MARGIN  IS THE RADIAL MARGIN AT 6 MM (SLIDE D)  TREATMENT EFFECT: NO KNOWN PRESURGICAL THERAPY  LYMPH-VASCULAR INVASION: NOT IDENTIFIED  PERINEURAL INVASION: NOT IDENTIFIED  TUMOR DEPOSITS: NOT IDENTIFIED  REGIONAL LYMPH NODES: 4 OUT OF 37 (4/37) LYMPH NODES SHOW METASTATIC  ADENOCARCINOMA  SPECIAL STUDIES: IMMUNOSTAINS FOR MMR ARE PENDING WITH REPORT TO FOLLOW  TUMOR STAGE: pT4b N2a    Current Status:    4/7/20: Presents today to clinic for evaluation for erythema and swelling with scant drainage from her lower midline incision.  No fevers or chills.  Previously was on antibiotics for 7 days with slight improvement.  Lower part of the incision was initially erythematous but has since healed well.  Now presents with drainage and erythema from the upper part of the incision.  4/14/20:  Presents for wound evaluation.  Doing well.  Has a scant amount of drainage from the superior and inferior aspect of the wound that is clear colored.  Wears an ABD pad and change the ABD pad 3 to 4 times a day.  ABD pad has approximately a silver dollar size amount of clear drainage on it.  No foul odor.  No purulence.  4/28/20:  Doing well.  Inferior aspect of the incision is healed completely.  Superior aspect is open and is draining clear liquid.  No fevers or chills.  Having regular bowel movements.  No abdominal pain.  Overall feels well.  Slight pain in her back and tailbone.    Review of Systems:  Review of Systems   Constitutional: Negative for chills,  diaphoresis, fever, malaise/fatigue and weight loss.   HENT: Negative for congestion.    Respiratory: Negative for shortness of breath.    Cardiovascular: Negative for chest pain and leg swelling.   Gastrointestinal: Negative for abdominal pain, blood in stool, constipation, nausea and vomiting.   Genitourinary: Negative for dysuria.   Musculoskeletal: Positive for back pain. Negative for myalgias.   Skin: Positive for rash.   Neurological: Negative for dizziness and weakness.   Endo/Heme/Allergies: Does not bruise/bleed easily.   Psychiatric/Behavioral: Negative for depression.       OBJECTIVE:     Vital Signs (Most Recent)  There were no vitals taken for this visit. ( no vital taken.  Telemedicine visit)    Physical Exam:  General: White female in no distress   Respiratory: respirations are even and unlabored  Cardiac:  Not examined  Abdomen:  Photographs was sent.  Incision appears to have decreased erythema.  Superior aspect is open and is drained well.  No surrounding erythema.  No signs of infection.    Extremities: Warm dry and intact  Anorectal:  Deferred    ASSESSMENT/PLAN:     Diagnoses and all orders for this visit:    Malignant neoplasm of sigmoid colon  -     Case request GI: COLONOSCOPY with Magda in March 2021        50 y.o. female post op from laparoscopic assisted sigmoid colectomy with en bloc hysterectomy and oophorectomy for T4bN2a stage IIIC colon cancer who presents with low-grade superficial wound infection.  This has responded well to antibiotics.  Based on her photograph today, she appears to have a seroma that is draining.  Overall, she appears to be healing well.  She will get port placement at the end of the week followed by chemotherapy next week.  She will need repeat colonoscopy by me in a year.  She is welcome to follow up with me with any issues in the meantime.      THOMAS Man MD, FACS  Staff Surgeon  Colon & Rectal Surgery

## 2020-04-29 ENCOUNTER — LAB VISIT (OUTPATIENT)
Dept: INFUSION THERAPY | Facility: HOSPITAL | Age: 50
End: 2020-04-29
Attending: PHYSICIAN ASSISTANT
Payer: COMMERCIAL

## 2020-04-29 ENCOUNTER — LAB VISIT (OUTPATIENT)
Dept: LAB | Facility: HOSPITAL | Age: 50
End: 2020-04-29
Attending: INTERNAL MEDICINE
Payer: COMMERCIAL

## 2020-04-29 DIAGNOSIS — C18.7 MALIGNANT NEOPLASM OF SIGMOID COLON: ICD-10-CM

## 2020-04-29 DIAGNOSIS — C77.2 METASTASIS TO INTESTINAL LYMPH NODE: ICD-10-CM

## 2020-04-29 DIAGNOSIS — U07.1 COVID-19: ICD-10-CM

## 2020-04-29 LAB
ALBUMIN SERPL BCP-MCNC: 4.3 G/DL (ref 3.5–5.2)
ALP SERPL-CCNC: 101 U/L (ref 38–145)
ALT SERPL W/O P-5'-P-CCNC: 23 U/L (ref 0–35)
ANION GAP SERPL CALC-SCNC: 7 MMOL/L (ref 8–16)
AST SERPL-CCNC: 29 U/L (ref 14–36)
BASOPHILS # BLD AUTO: 0.06 K/UL (ref 0–0.2)
BASOPHILS NFR BLD: 1 % (ref 0–1.9)
BILIRUB SERPL-MCNC: 0.2 MG/DL (ref 0.2–1.3)
BUN SERPL-MCNC: 16 MG/DL (ref 7–18)
CALCIUM SERPL-MCNC: 10.9 MG/DL (ref 8.4–10.2)
CHLORIDE SERPL-SCNC: 105 MMOL/L (ref 95–110)
CO2 SERPL-SCNC: 26 MMOL/L (ref 22–31)
CREAT SERPL-MCNC: 0.71 MG/DL (ref 0.5–1.4)
DIFFERENTIAL METHOD: ABNORMAL
EOSINOPHIL # BLD AUTO: 0.3 K/UL (ref 0–0.5)
EOSINOPHIL NFR BLD: 5.3 % (ref 0–8)
ERYTHROCYTE [DISTWIDTH] IN BLOOD BY AUTOMATED COUNT: 15.5 % (ref 11.5–14.5)
EST. GFR  (AFRICAN AMERICAN): >60 ML/MIN/1.73 M^2
EST. GFR  (NON AFRICAN AMERICAN): >60 ML/MIN/1.73 M^2
GLUCOSE SERPL-MCNC: 96 MG/DL (ref 70–110)
HCT VFR BLD AUTO: 36.4 % (ref 37–48.5)
HGB BLD-MCNC: 10.9 G/DL (ref 12–16)
IMM GRANULOCYTES # BLD AUTO: 0.02 K/UL (ref 0–0.04)
IMM GRANULOCYTES NFR BLD AUTO: 0.3 % (ref 0–0.5)
LYMPHOCYTES # BLD AUTO: 2.2 K/UL (ref 1–4.8)
LYMPHOCYTES NFR BLD: 36.8 % (ref 18–48)
MCH RBC QN AUTO: 24.3 PG (ref 27–31)
MCHC RBC AUTO-ENTMCNC: 29.9 G/DL (ref 32–36)
MCV RBC AUTO: 81 FL (ref 82–98)
MONOCYTES # BLD AUTO: 0.5 K/UL (ref 0.3–1)
MONOCYTES NFR BLD: 8.7 % (ref 4–15)
NEUTROPHILS # BLD AUTO: 2.9 K/UL (ref 1.8–7.7)
NEUTROPHILS NFR BLD: 47.9 % (ref 38–73)
NRBC BLD-RTO: 0 /100 WBC
PLATELET # BLD AUTO: 462 K/UL (ref 150–350)
PMV BLD AUTO: 10 FL (ref 9.2–12.9)
POTASSIUM SERPL-SCNC: 4.3 MMOL/L (ref 3.5–5.1)
PROT SERPL-MCNC: 7.5 G/DL (ref 6–8.4)
RBC # BLD AUTO: 4.48 M/UL (ref 4–5.4)
SODIUM SERPL-SCNC: 138 MMOL/L (ref 136–145)
WBC # BLD AUTO: 6.06 K/UL (ref 3.9–12.7)

## 2020-04-29 PROCEDURE — 80053 COMPREHEN METABOLIC PANEL: CPT | Mod: PN

## 2020-04-29 PROCEDURE — 80053 COMPREHEN METABOLIC PANEL: CPT

## 2020-04-29 PROCEDURE — U0002 COVID-19 LAB TEST NON-CDC: HCPCS

## 2020-04-29 PROCEDURE — 36415 COLL VENOUS BLD VENIPUNCTURE: CPT | Mod: PN

## 2020-04-29 PROCEDURE — 85025 COMPLETE CBC W/AUTO DIFF WBC: CPT | Mod: PN

## 2020-04-29 PROCEDURE — 85025 COMPLETE CBC W/AUTO DIFF WBC: CPT

## 2020-04-30 ENCOUNTER — TELEPHONE (OUTPATIENT)
Dept: INTERVENTIONAL RADIOLOGY/VASCULAR | Facility: HOSPITAL | Age: 50
End: 2020-04-30

## 2020-04-30 DIAGNOSIS — Z03.818 ENCNTR FOR OBS FOR SUSP EXPSR TO OTH BIOLG AGENTS RULED OUT: Primary | ICD-10-CM

## 2020-04-30 LAB — SARS-COV-2 RNA RESP QL NAA+PROBE: NOT DETECTED

## 2020-04-30 RX ORDER — FENTANYL CITRATE 50 UG/ML
50 INJECTION, SOLUTION INTRAMUSCULAR; INTRAVENOUS
Status: CANCELLED | OUTPATIENT
Start: 2020-04-30

## 2020-04-30 RX ORDER — MIDAZOLAM HYDROCHLORIDE 1 MG/ML
1 INJECTION INTRAMUSCULAR; INTRAVENOUS
Status: CANCELLED | OUTPATIENT
Start: 2020-04-30

## 2020-04-30 NOTE — TELEPHONE ENCOUNTER
Spoke with patient  Your test was NEGATIVE for COVID-19 (coronavirus).  If you still have symptoms, treat with rest, fluids, and over-the-counter medications.  Continue to stay home and practice proper handwashing.     If your symptoms worsen or if you have any other concerns, please contact [facility] at [phone].     Sincerely,    Hien Lyons, NP

## 2020-05-01 ENCOUNTER — HOSPITAL ENCOUNTER (OUTPATIENT)
Facility: HOSPITAL | Age: 50
Discharge: HOME | End: 2020-05-01
Attending: PSYCHIATRY & NEUROLOGY | Admitting: PSYCHIATRY & NEUROLOGY
Payer: COMMERCIAL

## 2020-05-01 VITALS
HEIGHT: 66 IN | DIASTOLIC BLOOD PRESSURE: 71 MMHG | OXYGEN SATURATION: 100 % | TEMPERATURE: 98 F | SYSTOLIC BLOOD PRESSURE: 115 MMHG | RESPIRATION RATE: 13 BRPM | WEIGHT: 252 LBS | BODY MASS INDEX: 40.5 KG/M2 | HEART RATE: 72 BPM

## 2020-05-01 DIAGNOSIS — C18.7 MALIGNANT NEOPLASM OF SIGMOID COLON: ICD-10-CM

## 2020-05-01 LAB
INR PPP: 1.1 (ref 0.8–1.2)
PROTHROMBIN TIME: 11 SEC (ref 9–12.5)

## 2020-05-01 PROCEDURE — 63600175 PHARM REV CODE 636 W HCPCS: Performed by: FAMILY MEDICINE

## 2020-05-01 PROCEDURE — 25000003 PHARM REV CODE 250: Performed by: FAMILY MEDICINE

## 2020-05-01 PROCEDURE — 25000003 PHARM REV CODE 250: Performed by: RADIOLOGY

## 2020-05-01 PROCEDURE — 85610 PROTHROMBIN TIME: CPT

## 2020-05-01 PROCEDURE — 63600175 PHARM REV CODE 636 W HCPCS: Performed by: STUDENT IN AN ORGANIZED HEALTH CARE EDUCATION/TRAINING PROGRAM

## 2020-05-01 PROCEDURE — 63600175 PHARM REV CODE 636 W HCPCS: Performed by: RADIOLOGY

## 2020-05-01 RX ORDER — SODIUM CHLORIDE 9 MG/ML
500 INJECTION, SOLUTION INTRAVENOUS ONCE
Status: COMPLETED | OUTPATIENT
Start: 2020-05-01 | End: 2020-05-01

## 2020-05-01 RX ORDER — OXYCODONE HYDROCHLORIDE 5 MG/1
5 TABLET ORAL ONCE
Status: COMPLETED | OUTPATIENT
Start: 2020-05-01 | End: 2020-05-01

## 2020-05-01 RX ORDER — FENTANYL CITRATE 50 UG/ML
INJECTION, SOLUTION INTRAMUSCULAR; INTRAVENOUS CODE/TRAUMA/SEDATION MEDICATION
Status: COMPLETED | OUTPATIENT
Start: 2020-05-01 | End: 2020-05-01

## 2020-05-01 RX ORDER — VANCOMYCIN HCL IN 5 % DEXTROSE 1G/250ML
1000 PLASTIC BAG, INJECTION (ML) INTRAVENOUS
Status: COMPLETED | OUTPATIENT
Start: 2020-05-01 | End: 2020-05-01

## 2020-05-01 RX ORDER — HEPARIN 100 UNIT/ML
SYRINGE INTRAVENOUS CODE/TRAUMA/SEDATION MEDICATION
Status: COMPLETED | OUTPATIENT
Start: 2020-05-01 | End: 2020-05-01

## 2020-05-01 RX ORDER — MIDAZOLAM HYDROCHLORIDE 1 MG/ML
INJECTION INTRAMUSCULAR; INTRAVENOUS CODE/TRAUMA/SEDATION MEDICATION
Status: COMPLETED | OUTPATIENT
Start: 2020-05-01 | End: 2020-05-01

## 2020-05-01 RX ADMIN — FENTANYL CITRATE 25 MCG: 50 INJECTION, SOLUTION INTRAMUSCULAR; INTRAVENOUS at 12:05

## 2020-05-01 RX ADMIN — HEPARIN SODIUM (PORCINE) LOCK FLUSH IV SOLN 100 UNIT/ML 5 ML: 100 SOLUTION at 12:05

## 2020-05-01 RX ADMIN — FENTANYL CITRATE 50 MCG: 50 INJECTION, SOLUTION INTRAMUSCULAR; INTRAVENOUS at 12:05

## 2020-05-01 RX ADMIN — VANCOMYCIN HYDROCHLORIDE 1000 MG: 1 INJECTION, POWDER, LYOPHILIZED, FOR SOLUTION INTRAVENOUS at 10:05

## 2020-05-01 RX ADMIN — MIDAZOLAM HYDROCHLORIDE 0.5 MG: 1 INJECTION, SOLUTION INTRAMUSCULAR; INTRAVENOUS at 12:05

## 2020-05-01 RX ADMIN — SODIUM CHLORIDE 500 ML: 0.9 INJECTION, SOLUTION INTRAVENOUS at 10:05

## 2020-05-01 RX ADMIN — MIDAZOLAM HYDROCHLORIDE 1 MG: 1 INJECTION, SOLUTION INTRAMUSCULAR; INTRAVENOUS at 12:05

## 2020-05-01 RX ADMIN — OXYCODONE HYDROCHLORIDE 5 MG: 5 TABLET ORAL at 02:05

## 2020-05-01 NOTE — PLAN OF CARE
Dr. White at bedside, patient okay for discharge at this time, IV removed without difficulty at this time, procedure site dressing CDI, patient education provided by Halina Philippe RN, patient discharged via wheelchair at this time.

## 2020-05-01 NOTE — PLAN OF CARE
imaging guided port cath placement completed. NAD noted.  Dressing applied to right chest, CDI.  PT to be transferred to [facility]. Report given at bedside.

## 2020-05-01 NOTE — PROCEDURES
Radiology Post-Procedure Note    Pre Op Diagnosis: Sigmoid colon cancer    Post Op Diagnosis: Same    Procedure: Port placement    Procedure performed by: Raquel Min MD, Gael White MD    Written Informed Consent Obtained: Yes    Specimen Removed: No    Estimated Blood Loss: Minimal    Findings:   Using realtime U/S guidance a 8 Fr port catheter was placed into the right internal jugular vein with tip of the catheter in the SVC. A pocket was made in the upper right chest wall and a port was placed. The skin was sutured closed over the port.    Port is ready for use.     Raquel Min MD  Resident  Department of Radiology  Pager: [phone]

## 2020-05-01 NOTE — DISCHARGE SUMMARY
Radiology Discharge Summary      Hospital Course: No complications    Procedure Performed: Port placement    Admit Date: 5/1/2020  Discharge Date: 05/01/2020     Instructions Given to Patient: Yes  Diet: Resume prior diet  Activity: activity as tolerated and no driving for today    Description of Condition on Discharge: Stable  Vital Signs (Most Recent): Temp: 97.6 °F (36.4 °C) (05/01/20 1007)  Pulse: 73 (05/01/20 1245)  Resp: 14 (05/01/20 1245)  BP: 127/80 (05/01/20 1245)  SpO2: 100 % (05/01/20 1245)    Discharge Disposition: Home    Discharge Diagnosis: Sigmoid colon cancer     Follow-up:   Further follow-up per ordering physician.      Raquel Min MD  Resident  Department of Radiology  Pager: [phone]

## 2020-05-01 NOTE — H&P
Radiology History & Physical      SUBJECTIVE:     Chief Complaint: sigmoid colon cancer.  History of Present Illness:  Gail Mckinnon is a 50 y.o. female who presents for port cath placement.     Past Medical History:   Diagnosis Date    Anxiety     Depression     FH: ovarian cancer 3/16/2020    Hyperthyroidism     Hypothyroid     Kidney calculi     Malignant neoplasm of sigmoid colon 3/16/2020    Menorrhagia     Multinodular goiter 2012    Palpitation     Venous insufficiency      Past Surgical History:   Procedure Laterality Date     SECTION, CLASSIC      x3    COLONOSCOPY N/A 2020    Procedure: COLONOSCOPY;  Surgeon: Shane Parker MD;  Location: [facility] ENDO;  Service: Endoscopy;  Laterality: N/A;    CYSTOSCOPY W/ URETERAL STENT PLACEMENT Bilateral 3/25/2020    Procedure: CYSTOSCOPY, WITH URETERAL STENT INSERTION;  Surgeon: Claudio Tyson MD;  Location: [facility] OR [address] [place] [address];  Service: Urology;  Laterality: Bilateral;    LAPAROSCOPIC SIGMOIDECTOMY N/A 3/25/2020    Procedure: COLECTOMY, SIGMOID, LAPAROSCOPIC, flex sig, ERAS high;  Surgeon: Silvio Man MD;  [facility];  Service: Colon and Rectal;  Laterality: N/A;    MOBILIZATION OF SPLENIC FLEXURE  3/25/2020    Procedure: MOBILIZATION, SPLENIC FLEXURE;  Surgeon: Silvio Man MD;  [facility];  Service: Colon and Rectal;;    tonsillectomy      TOTAL ABDOMINAL HYSTERECTOMY W/ BILATERAL SALPINGOOPHORECTOMY N/A 3/25/2020    Procedure: HYSTERECTOMY, TOTAL, ABDOMINAL, WITH BILATERAL SALPINGO-OOPHORECTOMY;  Surgeon: Keron Brady MD;  Location: [facility] OR [address] [place] [address];  Service: Oncology;  Laterality: N/A;       Home Meds:   Prior to Admission medications    Medication Sig Start Date End Date Taking? Authorizing Provider   acetaminophen (TYLENOL) 500 MG tablet Take 2 tablets (1,000 mg total) by mouth every 8 (eight) hours. 3/28/20  Yes Freddie Fan MD   ascorbic acid, vitamin C,  (VITAMIN C) 1000 MG tablet Take 1,000 mg by mouth once daily.   Yes Historical Provider, MD   buPROPion (WELLBUTRIN SR) 150 MG TBSR 12 hr tablet Take 1 tablet (150 mg total) by mouth 2 (two) times daily. 11/14/19  Yes Tiam Mederos MD   busPIRone (BUSPAR) 10 MG tablet Take 1 tablet (10 mg total) by mouth 2 (two) times daily. 11/15/19  Yes Tima Mederos MD   levothyroxine (SYNTHROID) 200 MCG tablet TAKE 1 TABLET BY MOUTH ONCE DAILY BEFORE BREAKFAST 9/11/19  Yes Akil Moss,    multivitamin (ONE DAILY MULTIVITAMIN) per tablet Take 1 tablet by mouth once daily.   Yes Historical Provider, MD   ondansetron (ZOFRAN) 4 MG tablet Take 1 tablet (4 mg total) by mouth every 8 (eight) hours as needed for Nausea. 6/23/19  Yes Jarad Fall,    oxyCODONE (ROXICODONE) 10 mg Tab immediate release tablet Take 1 tablet (10 mg total) by mouth every 4 (four) hours as needed. 4/3/20  Yes Silvio Man MD   traMADol (ULTRAM) 50 mg tablet Take 1 tablet (50 mg total) by mouth 3 (three) times daily as needed for Pain. 3/5/20  Yes Tima Mederos MD   cyclobenzaprine (FLEXERIL) 10 MG tablet Take 1 tablet (10 mg total) by mouth nightly.  Patient not taking: Reported on 4/7/2020 12/31/19   Hasmukh Daly,    lidocaine-prilocaine (EMLA) cream Apply topically as needed. 4/14/20   Vinod Saleh NP   ondansetron (ZOFRAN) 8 MG tablet Take 1 tablet (8 mg total) by mouth every 8 (eight) hours as needed for Nausea.  Patient not taking: Reported on 4/14/2020 4/3/20 5/3/20  MAXWELL Dove MD     Anticoagulants/Antiplatelets: no anticoagulation    Allergies:   Review of patient's allergies indicates:   Allergen Reactions    Penicillin g      unknown    Penicillins Hives     childhood allergy     Sedation History:  no adverse reactions    Review of Systems:   Hematological: no known coagulopathies  Respiratory: no shortness of breath  Cardiovascular: no chest pain  Gastrointestinal: no abdominal  pain  Genito-Urinary: no dysuria  Musculoskeletal: negative  Neurological: no TIA or stroke symptoms         OBJECTIVE:     Vital Signs (Most Recent)  Temp: 97.6 °F (36.4 °C) (05/01/20 1007)  Pulse: 72 (05/01/20 1007)  Resp: 17 (05/01/20 1007)  BP: 134/67 (05/01/20 1007)  SpO2: 100 % (05/01/20 1007)    Physical Exam:  ASA: 3  Mallampati: 2    General: no acute distress  Mental Status: alert and oriented to person, place and time  HEENT: normocephalic, atraumatic  Chest: unlabored breathing  Heart: regular heart rate  Abdomen: nondistended  Extremity: moves all extremities    Laboratory  Lab Results   Component Value Date    INR 1.1 03/25/2020       Lab Results   Component Value Date    WBC 6.06 04/29/2020    HGB 10.9 (L) 04/29/2020    HCT 36.4 (L) 04/29/2020    MCV 81 (L) 04/29/2020     (H) 04/29/2020      Lab Results   Component Value Date    GLU 96 04/29/2020     04/29/2020    K 4.3 04/29/2020     04/29/2020    CO2 26 04/29/2020    BUN 16 04/29/2020    CREATININE 0.71 04/29/2020    CALCIUM 10.9 (H) 04/29/2020    MG 1.8 03/27/2020    ALT 23 04/29/2020    AST 29 04/29/2020    ALBUMIN 4.3 04/29/2020    BILITOT 0.2 04/29/2020       ASSESSMENT/PLAN:     Sedation Plan: up to moderate   Patient will undergo imaging guided port cath placement scheduled for today.    Lianna White MD  Radiology Department/ PGY-2  [facility]

## 2020-05-01 NOTE — PROGRESS NOTES
Assume care of pt . Pt s/p right chest port placement. Vss NAD. Dressing clean, dry and intact. Will cont to monitor.

## 2020-05-04 ENCOUNTER — TELEPHONE (OUTPATIENT)
Dept: HEMATOLOGY/ONCOLOGY | Facility: CLINIC | Age: 50
End: 2020-05-04

## 2020-05-04 ENCOUNTER — PATIENT MESSAGE (OUTPATIENT)
Dept: HEMATOLOGY/ONCOLOGY | Facility: CLINIC | Age: 50
End: 2020-05-04

## 2020-05-05 ENCOUNTER — DOCUMENTATION ONLY (OUTPATIENT)
Dept: INFUSION THERAPY | Facility: HOSPITAL | Age: 50
End: 2020-05-05

## 2020-05-05 ENCOUNTER — OFFICE VISIT (OUTPATIENT)
Dept: HEMATOLOGY/ONCOLOGY | Facility: CLINIC | Age: 50
End: 2020-05-05
Payer: COMMERCIAL

## 2020-05-05 ENCOUNTER — PATIENT MESSAGE (OUTPATIENT)
Dept: ADMINISTRATIVE | Facility: HOSPITAL | Age: 50
End: 2020-05-05

## 2020-05-05 ENCOUNTER — PATIENT MESSAGE (OUTPATIENT)
Dept: HEMATOLOGY/ONCOLOGY | Facility: CLINIC | Age: 50
End: 2020-05-05

## 2020-05-05 DIAGNOSIS — C77.2 METASTASIS TO INTESTINAL LYMPH NODE: ICD-10-CM

## 2020-05-05 DIAGNOSIS — C18.7 MALIGNANT NEOPLASM OF SIGMOID COLON: Primary | ICD-10-CM

## 2020-05-05 DIAGNOSIS — G47.00 INSOMNIA, UNSPECIFIED TYPE: ICD-10-CM

## 2020-05-05 DIAGNOSIS — F41.9 ANXIETY: ICD-10-CM

## 2020-05-05 PROCEDURE — 99214 PR OFFICE/OUTPT VISIT, EST, LEVL IV, 30-39 MIN: ICD-10-PCS | Mod: 95,,, | Performed by: INTERNAL MEDICINE

## 2020-05-05 PROCEDURE — 99214 OFFICE O/P EST MOD 30 MIN: CPT | Mod: 95,,, | Performed by: INTERNAL MEDICINE

## 2020-05-05 RX ORDER — HEPARIN 100 UNIT/ML
500 SYRINGE INTRAVENOUS
Status: CANCELLED | OUTPATIENT
Start: 2020-05-08

## 2020-05-05 RX ORDER — SODIUM CHLORIDE 0.9 % (FLUSH) 0.9 %
10 SYRINGE (ML) INJECTION
Status: CANCELLED | OUTPATIENT
Start: 2020-05-06

## 2020-05-05 RX ORDER — EPINEPHRINE 0.3 MG/.3ML
0.3 INJECTION SUBCUTANEOUS ONCE AS NEEDED
Status: CANCELLED | OUTPATIENT
Start: 2020-05-06

## 2020-05-05 RX ORDER — SODIUM CHLORIDE 0.9 % (FLUSH) 0.9 %
10 SYRINGE (ML) INJECTION
Status: CANCELLED | OUTPATIENT
Start: 2020-05-08

## 2020-05-05 RX ORDER — HEPARIN 100 UNIT/ML
500 SYRINGE INTRAVENOUS
Status: CANCELLED | OUTPATIENT
Start: 2020-05-06

## 2020-05-05 RX ORDER — FLUOROURACIL 50 MG/ML
400 INJECTION, SOLUTION INTRAVENOUS
Status: CANCELLED | OUTPATIENT
Start: 2020-05-06

## 2020-05-05 RX ORDER — LORAZEPAM 0.5 MG/1
0.5 TABLET ORAL NIGHTLY PRN
Qty: 30 TABLET | Refills: 0 | Status: SHIPPED | OUTPATIENT
Start: 2020-05-05 | End: 2021-04-23

## 2020-05-05 RX ORDER — DIPHENHYDRAMINE HYDROCHLORIDE 50 MG/ML
50 INJECTION INTRAMUSCULAR; INTRAVENOUS ONCE AS NEEDED
Status: CANCELLED | OUTPATIENT
Start: 2020-05-06

## 2020-05-05 NOTE — PROGRESS NOTES
The patient location is:  private residence  The chief complaint leading to consultation is:  Stage III colon cancer  Visit type: Virtual visit with synchronous audio and video  Total time spent with patient:  Approximately 30 min total  Each patient to whom he or she provides medical services by telemedicine is:  (1) informed of the relationship between the physician and patient and the respective role of any other health care provider with respect to management of the patient; and (2) notified that he or she may decline to receive medical services by telemedicine and may withdraw from such care at any time.    Notes:     Subjective:         Patient ID: Gail Mckinnon is a 50 y.o. female.    Chief Complaint: Colon Cancer    49-year-old female who began having abdominal discomfort in June of 2019.  She was diagnosed with kidney stones and a CT scan showed some thickening in her colon.  She was then sent to see her PCP who recommended a colonoscopy.  The patient put this off until around January of 2020 when her PCP was adamant that she undergo further workup and she underwent colonoscopy in February of 2020.  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  The scans did not show obvious evidence of metastatic disease.  There was a small lesion in the liver that was concerning for a cyst in this will need to be followed closely.  She has healed from her surgery and she did have 37 lymph nodes removed of which 4 positive for cancer.    Overall today she feels well.  She did recently have a chemo port placed and site is healing well.  She does have some anxiety and some insomnia but otherwise has no complaints.  She denies abdominal pain or early satiety.  She denies melena, hematochezia red blood per rectum.  He  denies any CNS type symptoms.  Weight is relatively stable.  She denies chest pain or shortness of breath.  She denies any bone pain.    Review of Systems   Constitutional: Negative for activity change, appetite change, chills, diaphoresis, fatigue, fever and unexpected weight change.   HENT: Negative for facial swelling, mouth sores and sore throat.    Eyes: Negative for visual disturbance.   Respiratory: Negative for apnea, cough, choking and shortness of breath.    Cardiovascular: Negative for chest pain and leg swelling.   Gastrointestinal: Positive for abdominal pain (improved). Negative for abdominal distention, blood in stool, constipation, diarrhea, nausea, rectal pain and vomiting.   Endocrine: Negative for cold intolerance.   Genitourinary: Negative for difficulty urinating, frequency, hematuria, vaginal bleeding and vaginal discharge.   Musculoskeletal: Negative for back pain, joint swelling and neck pain.   Skin: Negative for color change, rash and wound.   Neurological: Negative for dizziness, weakness, numbness and headaches.   Hematological: Negative for adenopathy. Does not bruise/bleed easily.   Psychiatric/Behavioral: Negative for agitation, confusion, decreased concentration and hallucinations. The patient is nervous/anxious. The patient is not hyperactive.          Past Medical History:   Past Medical History:   Diagnosis Date    Anxiety     Depression     FH: ovarian cancer 3/16/2020    Hyperthyroidism     Hypothyroid     Kidney calculi     Malignant neoplasm of sigmoid colon 3/16/2020    Menorrhagia     Multinodular goiter 2012    Palpitation     Venous insufficiency         Past Surgical History:   Past Surgical History:   Procedure Laterality Date     SECTION, CLASSIC      x3    COLONOSCOPY N/A 2020    Procedure: COLONOSCOPY;  Surgeon: Shane Parker MD;  Location: [facility];  Service: Endoscopy;  Laterality: N/A;    CYSTOSCOPY W/ URETERAL STENT PLACEMENT  Bilateral 3/25/2020    Procedure: CYSTOSCOPY, WITH URETERAL STENT INSERTION;  Surgeon: Claudio Tyson MD;  Location: [facility] OR 2ND FLR;  Service: Urology;  Laterality: Bilateral;    INSERTION OF TUNNELED CENTRAL VENOUS CATHETER (CVC) WITH SUBCUTANEOUS PORT N/A 5/1/2020    Procedure: [id number];  Surgeon: Stephen Diagnostic Provider;  Location: [facility] OR 2ND FLR;  Service: Radiology;  Laterality: N/A;  Room 189/Cindy    LAPAROSCOPIC SIGMOIDECTOMY N/A 3/25/2020    Procedure: COLECTOMY, SIGMOID, LAPAROSCOPIC, flex sig, ERAS high;  Surgeon: Silvio Man MD;  Location: [facility] OR 2ND FLR;  Service: Colon and Rectal;  Laterality: N/A;    MOBILIZATION OF SPLENIC FLEXURE  3/25/2020    Procedure: MOBILIZATION, SPLENIC FLEXURE;  Surgeon: Silvio Man MD;  Location: [facility] OR [facility] FLR;  Service: Colon and Rectal;;    tonsillectomy      TOTAL ABDOMINAL HYSTERECTOMY W/ BILATERAL SALPINGOOPHORECTOMY N/A 3/25/2020    Procedure: HYSTERECTOMY, TOTAL, ABDOMINAL, WITH BILATERAL SALPINGO-OOPHORECTOMY;  Surgeon: Keron Brady MD;  [facility];  Service: Oncology;  Laterality: N/A;        Family History:   Family History   Problem Relation Age of Onset    Heart disease Father     Diabetes Mother 65    Cancer Maternal Aunt         lung cancer    Cancer Maternal Grandmother         stomach cancer- started in ovaries    Ovarian cancer Maternal Grandmother         stomach cancer- started in ovaries    Colon cancer Paternal Uncle     Cancer Paternal Uncle     Ovarian cancer Maternal Aunt     Ovarian cancer Maternal Aunt     Ovarian cancer Cousin         mother had ovarian cancer    Uterine cancer Neg Hx     Breast cancer Neg Hx         Social History:   Social History     Tobacco Use    Smoking status: Never Smoker    Smokeless tobacco: Never Used   Substance Use Topics    Alcohol use: Yes     Comment: occasionally- twice monthly        Allergies:   Review of patient's allergies  indicates:   Allergen Reactions    Penicillin g      unknown    Penicillins Hives     childhood allergy        Medications:     Current Outpatient Medications:     acetaminophen (TYLENOL) 500 MG tablet, Take 2 tablets (1,000 mg total) by mouth every 8 (eight) hours., Disp: 60 tablet, Rfl: 0    ascorbic acid, vitamin C, (VITAMIN C) 1000 MG tablet, Take 1,000 mg by mouth once daily., Disp: , Rfl:     buPROPion (WELLBUTRIN SR) 150 MG TBSR 12 hr tablet, Take 1 tablet (150 mg total) by mouth 2 (two) times daily., Disp: 180 tablet, Rfl: 0    busPIRone (BUSPAR) 10 MG tablet, Take 1 tablet (10 mg total) by mouth 2 (two) times daily., Disp: 180 tablet, Rfl: 0    cyclobenzaprine (FLEXERIL) 10 MG tablet, Take 1 tablet (10 mg total) by mouth nightly. (Patient not taking: Reported on 4/7/2020), Disp: 30 tablet, Rfl: 0    levothyroxine (SYNTHROID) 200 MCG tablet, TAKE 1 TABLET BY MOUTH ONCE DAILY BEFORE BREAKFAST, Disp: 90 tablet, Rfl: 3    lidocaine-prilocaine (EMLA) cream, Apply topically as needed., Disp: 25 g, Rfl: 0    LORazepam (ATIVAN) 0.5 MG tablet, Take 1 tablet (0.5 mg total) by mouth nightly as needed for Anxiety., Disp: 30 tablet, Rfl: 0    multivitamin (ONE DAILY MULTIVITAMIN) per tablet, Take 1 tablet by mouth once daily., Disp: , Rfl:     ondansetron (ZOFRAN) 4 MG tablet, Take 1 tablet (4 mg total) by mouth every 8 (eight) hours as needed for Nausea., Disp: 15 tablet, Rfl: 0    oxyCODONE (ROXICODONE) 10 mg Tab immediate release tablet, Take 1 tablet (10 mg total) by mouth every 4 (four) hours as needed., Disp: 40 tablet, Rfl: 0    traMADol (ULTRAM) 50 mg tablet, Take 1 tablet (50 mg total) by mouth 3 (three) times daily as needed for Pain., Disp: 90 tablet, Rfl: 2     Objective:      Physical Exam   Constitutional: She is oriented to person, place, and time. She appears well-developed and well-nourished. No distress.   HENT:   Head: Normocephalic and atraumatic.   Eyes: Pupils are equal, round, and  reactive to light. EOM are normal.   Neck: Normal range of motion. No tracheal deviation present.   Pulmonary/Chest: Effort normal. No stridor. No respiratory distress.   Musculoskeletal: Normal range of motion. She exhibits no deformity.   Neurological: She is alert and oriented to person, place, and time. No cranial nerve deficit.   Skin: She is not diaphoretic.   Psychiatric: She has a normal mood and affect. Her behavior is normal. Judgment and thought content normal.       Admission on 05/01/2020, Discharged on 05/01/2020   Component Date Value Ref Range Status    Prothrombin Time 05/01/2020 11.0  9.0 - 12.5 sec Final    INR 05/01/2020 1.1  0.8 - 1.2 Final    Comment: Coumadin Therapy:  2.0 - 3.0 for INR for all indicators except mechanical heart valves  and antiphospholipid syndromes which should use 2.5 - 3.5.     Lab Visit on 04/29/2020   Component Date Value Ref Range Status    SARS-CoV2 (COVID-19) Qualitative P* 04/29/2020 Not Detected  Not Detected Final    Comment: This test utilizes a real-time reverse transcription  polymerase chain reaction procedure to amplify and   detect the SARS-CoV-2 RdRp and N genes.    The analytical sensitivity (limit of detection) of   this assay is 100 copies/mL.   A Detected result is considered positive for COVID-19.  This patient is considered infected with the   SARS-CoV-2 virus and is presumed to be contagious.    A Not Detected result means that SARS-CoV-2 RNA is not  present above the limit of detection. It does not rule  out the possibility of COVID-19 and should not be the  sole basis for treatment decisions.  If COVID-19 is   strongly suspected based on clinical and exposure   history,re-testing should be considered.    This test is only for use under Food and Drug   Administration s Emergency Use Authorization (EUA).   Commercial reagents are provided by [employer].  Performance characteristics of the EUA have been   independently verified by [facility]   Department of Pathology and L                           aboratory Medicine.       Lab Visit on 04/29/2020   Component Date Value Ref Range Status    WBC 04/29/2020 6.06  3.90 - 12.70 K/uL Final    RBC 04/29/2020 4.48  4.00 - 5.40 M/uL Final    Hemoglobin 04/29/2020 10.9* 12.0 - 16.0 g/dL Final    Hematocrit 04/29/2020 36.4* 37.0 - 48.5 % Final    Mean Corpuscular Volume 04/29/2020 81* 82 - 98 fL Final    Mean Corpuscular Hemoglobin 04/29/2020 24.3* 27.0 - 31.0 pg Final    Mean Corpuscular Hemoglobin Conc 04/29/2020 29.9* 32.0 - 36.0 g/dL Final    RDW 04/29/2020 15.5* 11.5 - 14.5 % Final    Platelets 04/29/2020 462* 150 - 350 K/uL Final    MPV 04/29/2020 10.0  9.2 - 12.9 fL Final    Immature Granulocytes 04/29/2020 0.3  0.0 - 0.5 % Final    Gran # (ANC) 04/29/2020 2.9  1.8 - 7.7 K/uL Final    Immature Grans (Abs) 04/29/2020 0.02  0.00 - 0.04 K/uL Final    Comment: Mild elevation in immature granulocytes is non specific and   can be seen in a variety of conditions including stress response,   acute inflammation, trauma and pregnancy. Correlation with other   laboratory and clinical findings is essential.      Lymph # 04/29/2020 2.2  1.0 - 4.8 K/uL Final    Mono # 04/29/2020 0.5  0.3 - 1.0 K/uL Final    Eos # 04/29/2020 0.3  0.0 - 0.5 K/uL Final    Baso # 04/29/2020 0.06  0.00 - 0.20 K/uL Final    nRBC 04/29/2020 0  0 /100 WBC Final    Gran% 04/29/2020 47.9  38.0 - 73.0 % Final    Lymph% 04/29/2020 36.8  18.0 - 48.0 % Final    Mono% 04/29/2020 8.7  4.0 - 15.0 % Final    Eosinophil% 04/29/2020 5.3  0.0 - 8.0 % Final    Basophil% 04/29/2020 1.0  0.0 - 1.9 % Final    Differential Method 04/29/2020 Automated   Final    Sodium 04/29/2020 138  136 - 145 mmol/L Final    Potassium 04/29/2020 4.3  3.5 - 5.1 mmol/L Final    Chloride 04/29/2020 105  95 - 110 mmol/L Final    CO2 04/29/2020 26  22 - 31 mmol/L Final    Glucose 04/29/2020 96  70 - 110 mg/dL Final    Comment: The ADA  recommends the following guidelines for fasting glucose:  Normal:       less than 100 mg/dL  Prediabetes:  100 mg/dL to 125 mg/dL  Diabetes:     126 mg/dL or higher      BUN, Bld 04/29/2020 16  7 - 18 mg/dL Final    Creatinine 04/29/2020 0.71  0.50 - 1.40 mg/dL Final    Calcium 04/29/2020 10.9* 8.4 - 10.2 mg/dL Final    Total Protein 04/29/2020 7.5  6.0 - 8.4 g/dL Final    Albumin 04/29/2020 4.3  3.5 - 5.2 g/dL Final    Total Bilirubin 04/29/2020 0.2  0.2 - 1.3 mg/dL Final    Alkaline Phosphatase 04/29/2020 101  38 - 145 U/L Final    AST 04/29/2020 29  14 - 36 U/L Final    ALT 04/29/2020 23  0 - 35 U/L Final    Anion Gap 04/29/2020 7* 8 - 16 mmol/L Final    eGFR if African American 04/29/2020 >60  >60 mL/min/1.73 m^2 Final    eGFR if non African American 04/29/2020 >60  >60 mL/min/1.73 m^2 Final    Comment: Calculation used to obtain the estimated glomerular filtration  rate (eGFR) is the CKD-EPI equation.          Assessment:       1. Malignant neoplasm of sigmoid colon    2. 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 Anxiety    4. Insomnia, unspecified type          Plan:     49-year-old female with newly diagnosed [id number] poorly differentiated adenocarcinoma of the colon.      Plan:  1.  Stage III colon cancer:  I had a long discussion with the patient today we did review her previous imaging studies as well as labs and pathology.  I did talk to her about adjuvant chemotherapy with the attempt to reduce her risk of recurrence.  We did discuss fluorouracil and oxaliplatin and she is willing to proceed with this.  She is scheduled for cycle 1 tomorrow and has had her chemo port placed.  We again did discuss risks versus benefits and possible side effects and she is in agreement with this.  We will then see her back in 2 weeks for evaluation of her tolerance prior to cycle 2.    2.  Anxiety and insomnia:  The patient has been on BuSpar the past and is requesting a prescription for Ativan.  I did write  her a low-dose prescription of Ativan 0.5 mg number 30 with no refills for her to take p.r.n. q.h.s..  I did talk to her about the addictive potential of this medication and she states that she is going to take it sparingly.  We will monitor her tolerance on this.  She is also aware not to take this medication with her BuSpar or with any other narcotic or benzodiazepine medications.

## 2020-05-05 NOTE — PROGRESS NOTES
Pharmacy Treatment Plan Review    Patient  Gail Mckinnon, 50 y.o.  1970    Indication: Colon Cancer     Labs:  CBC  Lab Results   Component Value Date    WBC 6.06 04/29/2020    HGB 10.9 (L) 04/29/2020    HCT 36.4 (L) 04/29/2020    MCV 81 (L) 04/29/2020     (H) 04/29/2020       BMP  Lab Results   Component Value Date     04/29/2020    K 4.3 04/29/2020     04/29/2020    CO2 26 04/29/2020    BUN 16 04/29/2020    CREATININE 0.71 04/29/2020    CALCIUM 10.9 (H) 04/29/2020    ANIONGAP 7 (L) 04/29/2020    ESTGFRAFRICA >60 04/29/2020    EGFRNONAA >60 04/29/2020       LFTs  Lab Results   Component Value Date    ALT 23 04/29/2020    AST 29 04/29/2020    ALKPHOS 101 04/29/2020    BILITOT 0.2 04/29/2020       The patient is scheduled to start the following infusion:   OP FOLFOX 6 Q2W    14-day cycle for 12 cycles of:    Chemotherapy:   -leucovorin calcium 400 mg/m2 in dextrose 5 % 250 mL infusion, 400 mg/m2, Intravenous, Clinic/HOD 1 time, on day 1, concurrent with oxaliplatin    -oxaliplatin (ELOXATIN) 85 mg/m2  in dextrose 5 % 500 mL chemo infusion, Intravenous, Clinic/HOD 1 time, on day 1 concurrent with leucovorin    -fluorouraciL injection, 400 mg/m2, Intravenous, Clinic/HOD 1 time, on day 1    -fluorouracil (ADRUCIL) 2,400 mg/m2 in sodium chloride 0.9% 353.3 mL chemo infusion, Intravenous, Over 46 hours, starting day 1      Premeds  -Palonosetron 0.25 mg/Dexamethasone 12 mg     The treatment plan matches [facility] template     Dheeraj LagosD

## 2020-05-06 ENCOUNTER — INFUSION (OUTPATIENT)
Dept: INFUSION THERAPY | Facility: HOSPITAL | Age: 50
End: 2020-05-06
Attending: INTERNAL MEDICINE
Payer: COMMERCIAL

## 2020-05-06 VITALS
BODY MASS INDEX: 41.78 KG/M2 | WEIGHT: 259.94 LBS | HEIGHT: 66 IN | DIASTOLIC BLOOD PRESSURE: 78 MMHG | HEART RATE: 85 BPM | RESPIRATION RATE: 16 BRPM | TEMPERATURE: 98 F | SYSTOLIC BLOOD PRESSURE: 127 MMHG

## 2020-05-06 DIAGNOSIS — C18.7 MALIGNANT NEOPLASM OF SIGMOID COLON: ICD-10-CM

## 2020-05-06 DIAGNOSIS — C77.2 METASTASIS TO INTESTINAL LYMPH NODE: Primary | ICD-10-CM

## 2020-05-06 PROCEDURE — 96416 CHEMO PROLONG INFUSE W/PUMP: CPT | Mod: PN

## 2020-05-06 PROCEDURE — 96415 CHEMO IV INFUSION ADDL HR: CPT | Mod: PN

## 2020-05-06 PROCEDURE — 96411 CHEMO IV PUSH ADDL DRUG: CPT | Mod: PN

## 2020-05-06 PROCEDURE — 25000003 PHARM REV CODE 250: Mod: PN | Performed by: INTERNAL MEDICINE

## 2020-05-06 PROCEDURE — 96367 TX/PROPH/DG ADDL SEQ IV INF: CPT | Mod: PN

## 2020-05-06 PROCEDURE — 96413 CHEMO IV INFUSION 1 HR: CPT | Mod: PN

## 2020-05-06 PROCEDURE — A4216 STERILE WATER/SALINE, 10 ML: HCPCS | Mod: PN | Performed by: INTERNAL MEDICINE

## 2020-05-06 PROCEDURE — 96368 THER/DIAG CONCURRENT INF: CPT | Mod: PN

## 2020-05-06 PROCEDURE — 63600175 PHARM REV CODE 636 W HCPCS: Mod: PN | Performed by: INTERNAL MEDICINE

## 2020-05-06 RX ORDER — DIPHENHYDRAMINE HYDROCHLORIDE 50 MG/ML
50 INJECTION INTRAMUSCULAR; INTRAVENOUS ONCE AS NEEDED
Status: DISCONTINUED | OUTPATIENT
Start: 2020-05-06 | End: 2020-05-06 | Stop reason: HOSPADM

## 2020-05-06 RX ORDER — SODIUM CHLORIDE 0.9 % (FLUSH) 0.9 %
10 SYRINGE (ML) INJECTION
Status: DISCONTINUED | OUTPATIENT
Start: 2020-05-06 | End: 2020-05-06 | Stop reason: HOSPADM

## 2020-05-06 RX ORDER — EPINEPHRINE 0.3 MG/.3ML
0.3 INJECTION SUBCUTANEOUS ONCE AS NEEDED
Status: DISCONTINUED | OUTPATIENT
Start: 2020-05-06 | End: 2020-05-06 | Stop reason: HOSPADM

## 2020-05-06 RX ORDER — HEPARIN 100 UNIT/ML
500 SYRINGE INTRAVENOUS
Status: DISCONTINUED | OUTPATIENT
Start: 2020-05-06 | End: 2020-05-06 | Stop reason: HOSPADM

## 2020-05-06 RX ORDER — FLUOROURACIL 50 MG/ML
400 INJECTION, SOLUTION INTRAVENOUS
Status: COMPLETED | OUTPATIENT
Start: 2020-05-06 | End: 2020-05-06

## 2020-05-06 RX ADMIN — DEXTROSE MONOHYDRATE: 50 INJECTION, SOLUTION INTRAVENOUS at 10:05

## 2020-05-06 RX ADMIN — OXALIPLATIN 200 MG: 50 INJECTION, SOLUTION, CONCENTRATE INTRAVENOUS at 10:05

## 2020-05-06 RX ADMIN — SODIUM CHLORIDE: 9 INJECTION, SOLUTION INTRAVENOUS at 09:05

## 2020-05-06 RX ADMIN — FLUOROURACIL 5665 MG: 50 INJECTION, SOLUTION INTRAVENOUS at 01:05

## 2020-05-06 RX ADMIN — DEXAMETHASONE SODIUM PHOSPHATE: 4 INJECTION, SOLUTION INTRA-ARTICULAR; INTRALESIONAL; INTRAMUSCULAR; INTRAVENOUS; SOFT TISSUE at 09:05

## 2020-05-06 RX ADMIN — FLUOROURACIL 945 MG: 50 INJECTION, SOLUTION INTRAVENOUS at 12:05

## 2020-05-06 RX ADMIN — LEUCOVORIN CALCIUM 900 MG: 350 INJECTION, POWDER, LYOPHILIZED, FOR SOLUTION INTRAMUSCULAR; INTRAVENOUS at 10:05

## 2020-05-06 RX ADMIN — Medication 10 ML: at 09:05

## 2020-05-07 ENCOUNTER — TELEPHONE (OUTPATIENT)
Dept: INFUSION THERAPY | Facility: HOSPITAL | Age: 50
End: 2020-05-07

## 2020-05-07 ENCOUNTER — PATIENT MESSAGE (OUTPATIENT)
Dept: HEMATOLOGY/ONCOLOGY | Facility: CLINIC | Age: 50
End: 2020-05-07

## 2020-05-08 ENCOUNTER — INFUSION (OUTPATIENT)
Dept: INFUSION THERAPY | Facility: HOSPITAL | Age: 50
End: 2020-05-08
Attending: INTERNAL MEDICINE
Payer: COMMERCIAL

## 2020-05-08 DIAGNOSIS — C18.7 MALIGNANT NEOPLASM OF SIGMOID COLON: ICD-10-CM

## 2020-05-08 DIAGNOSIS — C77.2 METASTASIS TO INTESTINAL LYMPH NODE: Primary | ICD-10-CM

## 2020-05-08 PROCEDURE — 63600175 PHARM REV CODE 636 W HCPCS: Mod: PN | Performed by: INTERNAL MEDICINE

## 2020-05-08 PROCEDURE — 96523 IRRIG DRUG DELIVERY DEVICE: CPT | Mod: PN

## 2020-05-08 PROCEDURE — A4216 STERILE WATER/SALINE, 10 ML: HCPCS | Mod: PN | Performed by: INTERNAL MEDICINE

## 2020-05-08 PROCEDURE — 25000003 PHARM REV CODE 250: Mod: PN | Performed by: INTERNAL MEDICINE

## 2020-05-08 RX ORDER — HEPARIN 100 UNIT/ML
500 SYRINGE INTRAVENOUS
Status: DISCONTINUED | OUTPATIENT
Start: 2020-05-08 | End: 2020-05-08 | Stop reason: HOSPADM

## 2020-05-08 RX ORDER — SODIUM CHLORIDE 0.9 % (FLUSH) 0.9 %
10 SYRINGE (ML) INJECTION
Status: DISCONTINUED | OUTPATIENT
Start: 2020-05-08 | End: 2020-05-08 | Stop reason: HOSPADM

## 2020-05-08 RX ADMIN — Medication 10 ML: at 10:05

## 2020-05-08 RX ADMIN — HEPARIN 500 UNITS: 100 SYRINGE at 10:05

## 2020-05-14 ENCOUNTER — PATIENT MESSAGE (OUTPATIENT)
Dept: HEMATOLOGY/ONCOLOGY | Facility: CLINIC | Age: 50
End: 2020-05-14

## 2020-05-14 ENCOUNTER — NURSE TRIAGE (OUTPATIENT)
Dept: ADMINISTRATIVE | Facility: CLINIC | Age: 50
End: 2020-05-14

## 2020-05-14 NOTE — TELEPHONE ENCOUNTER
Called for post procedural symptom tracker. No answer and no need to call again today. Nurse will reach out tomorrow.    Reason for Disposition   No answer.  First attempt to contact caller.  Follow-up call scheduled within 15 minutes.    Additional Information   Negative: Caller is angry or rude (e.g., hangs up, verbally abusive, yelling)   Negative: Caller hangs up   Negative: Caller has already spoken with the PCP and has no further questions.   Negative: Caller has already spoken with another triager and has no further questions.   Negative: Caller has already spoken with another triager or PCP AND has further questions AND triager able to answer questions.   Negative: Busy signal.  First attempt to contact caller.  Follow-up call scheduled within 15 minutes.    Protocols used: NO CONTACT OR DUPLICATE CONTACT CALL-A-AH

## 2020-05-15 NOTE — TELEPHONE ENCOUNTER
POST PROCEDURE FOLLOW UP  Unable to reach pt by phone  Day 14  No f/u call required  Reason for Disposition   Non-urgent call redirected to PCP's office because it is open    Protocols used: NO CONTACT OR DUPLICATE CONTACT CALL-A-AH

## 2020-05-18 ENCOUNTER — LAB VISIT (OUTPATIENT)
Dept: INFUSION THERAPY | Facility: HOSPITAL | Age: 50
End: 2020-05-18
Attending: INTERNAL MEDICINE
Payer: COMMERCIAL

## 2020-05-18 ENCOUNTER — TELEPHONE (OUTPATIENT)
Dept: HEMATOLOGY/ONCOLOGY | Facility: CLINIC | Age: 50
End: 2020-05-18

## 2020-05-18 ENCOUNTER — LAB VISIT (OUTPATIENT)
Dept: LAB | Facility: HOSPITAL | Age: 50
End: 2020-05-18
Attending: INTERNAL MEDICINE
Payer: COMMERCIAL

## 2020-05-18 DIAGNOSIS — Z03.818 ENCNTR FOR OBS FOR SUSP EXPSR TO OTH BIOLG AGENTS RULED OUT: ICD-10-CM

## 2020-05-18 DIAGNOSIS — C18.7 MALIGNANT NEOPLASM OF SIGMOID COLON: ICD-10-CM

## 2020-05-18 DIAGNOSIS — C77.2 METASTASIS TO INTESTINAL LYMPH NODE: ICD-10-CM

## 2020-05-18 LAB
ALBUMIN SERPL BCP-MCNC: 4.1 G/DL (ref 3.5–5.2)
ALP SERPL-CCNC: 101 U/L (ref 38–145)
ALT SERPL W/O P-5'-P-CCNC: 23 U/L (ref 0–35)
ANION GAP SERPL CALC-SCNC: 8 MMOL/L (ref 8–16)
AST SERPL-CCNC: 25 U/L (ref 14–36)
BASOPHILS # BLD AUTO: 0.03 K/UL (ref 0–0.2)
BASOPHILS NFR BLD: 0.6 % (ref 0–1.9)
BILIRUB SERPL-MCNC: 0.2 MG/DL (ref 0.2–1.3)
BUN SERPL-MCNC: 14 MG/DL (ref 7–18)
CALCIUM SERPL-MCNC: 10.7 MG/DL (ref 8.4–10.2)
CHLORIDE SERPL-SCNC: 107 MMOL/L (ref 95–110)
CO2 SERPL-SCNC: 25 MMOL/L (ref 22–31)
CREAT SERPL-MCNC: 0.77 MG/DL (ref 0.5–1.4)
DIFFERENTIAL METHOD: ABNORMAL
EOSINOPHIL # BLD AUTO: 0.3 K/UL (ref 0–0.5)
EOSINOPHIL NFR BLD: 6.8 % (ref 0–8)
ERYTHROCYTE [DISTWIDTH] IN BLOOD BY AUTOMATED COUNT: 15.4 % (ref 11.5–14.5)
EST. GFR  (AFRICAN AMERICAN): >60 ML/MIN/1.73 M^2
EST. GFR  (NON AFRICAN AMERICAN): >60 ML/MIN/1.73 M^2
GLUCOSE SERPL-MCNC: 122 MG/DL (ref 70–110)
HCT VFR BLD AUTO: 35.4 % (ref 37–48.5)
HGB BLD-MCNC: 10.3 G/DL (ref 12–16)
IMM GRANULOCYTES # BLD AUTO: 0.01 K/UL (ref 0–0.04)
IMM GRANULOCYTES NFR BLD AUTO: 0.2 % (ref 0–0.5)
LYMPHOCYTES # BLD AUTO: 1.6 K/UL (ref 1–4.8)
LYMPHOCYTES NFR BLD: 33.7 % (ref 18–48)
MCH RBC QN AUTO: 23.6 PG (ref 27–31)
MCHC RBC AUTO-ENTMCNC: 29.1 G/DL (ref 32–36)
MCV RBC AUTO: 81 FL (ref 82–98)
MONOCYTES # BLD AUTO: 0.5 K/UL (ref 0.3–1)
MONOCYTES NFR BLD: 11.2 % (ref 4–15)
NEUTROPHILS # BLD AUTO: 2.3 K/UL (ref 1.8–7.7)
NEUTROPHILS NFR BLD: 47.5 % (ref 38–73)
NRBC BLD-RTO: 0 /100 WBC
PLATELET # BLD AUTO: 392 K/UL (ref 150–350)
PMV BLD AUTO: 9.6 FL (ref 9.2–12.9)
POTASSIUM SERPL-SCNC: 3.9 MMOL/L (ref 3.5–5.1)
PROT SERPL-MCNC: 7.2 G/DL (ref 6–8.4)
RBC # BLD AUTO: 4.37 M/UL (ref 4–5.4)
SODIUM SERPL-SCNC: 140 MMOL/L (ref 136–145)
WBC # BLD AUTO: 4.83 K/UL (ref 3.9–12.7)

## 2020-05-18 PROCEDURE — 36415 COLL VENOUS BLD VENIPUNCTURE: CPT | Mod: PN

## 2020-05-18 PROCEDURE — 85025 COMPLETE CBC W/AUTO DIFF WBC: CPT | Mod: PN

## 2020-05-18 PROCEDURE — 80053 COMPREHEN METABOLIC PANEL: CPT | Mod: PN

## 2020-05-18 PROCEDURE — 80053 COMPREHEN METABOLIC PANEL: CPT

## 2020-05-18 PROCEDURE — U0003 INFECTIOUS AGENT DETECTION BY NUCLEIC ACID (DNA OR RNA); SEVERE ACUTE RESPIRATORY SYNDROME CORONAVIRUS 2 (SARS-COV-2) (CORONAVIRUS DISEASE [COVID-19]), AMPLIFIED PROBE TECHNIQUE, MAKING USE OF HIGH THROUGHPUT TECHNOLOGIES AS DESCRIBED BY CMS-2020-01-R: HCPCS

## 2020-05-18 PROCEDURE — 85025 COMPLETE CBC W/AUTO DIFF WBC: CPT

## 2020-05-19 ENCOUNTER — OFFICE VISIT (OUTPATIENT)
Dept: HEMATOLOGY/ONCOLOGY | Facility: CLINIC | Age: 50
End: 2020-05-19
Payer: COMMERCIAL

## 2020-05-19 ENCOUNTER — TELEPHONE (OUTPATIENT)
Dept: HEMATOLOGY/ONCOLOGY | Facility: CLINIC | Age: 50
End: 2020-05-19

## 2020-05-19 DIAGNOSIS — C77.2 METASTASIS TO INTESTINAL LYMPH NODE: ICD-10-CM

## 2020-05-19 DIAGNOSIS — C18.7 MALIGNANT NEOPLASM OF SIGMOID COLON: Primary | ICD-10-CM

## 2020-05-19 DIAGNOSIS — K59.09 OTHER CONSTIPATION: ICD-10-CM

## 2020-05-19 DIAGNOSIS — R11.0 NAUSEA: ICD-10-CM

## 2020-05-19 DIAGNOSIS — F51.04 PSYCHOPHYSIOLOGICAL INSOMNIA: ICD-10-CM

## 2020-05-19 DIAGNOSIS — F41.9 ANXIETY: ICD-10-CM

## 2020-05-19 PROBLEM — G47.00 INSOMNIA: Status: ACTIVE | Noted: 2020-05-19

## 2020-05-19 LAB — SARS-COV-2 RNA RESP QL NAA+PROBE: NOT DETECTED

## 2020-05-19 PROCEDURE — 99214 OFFICE O/P EST MOD 30 MIN: CPT | Mod: 95,,, | Performed by: INTERNAL MEDICINE

## 2020-05-19 PROCEDURE — 99214 PR OFFICE/OUTPT VISIT, EST, LEVL IV, 30-39 MIN: ICD-10-PCS | Mod: 95,,, | Performed by: INTERNAL MEDICINE

## 2020-05-19 RX ORDER — FLUOROURACIL 50 MG/ML
400 INJECTION, SOLUTION INTRAVENOUS
Status: CANCELLED | OUTPATIENT
Start: 2020-05-20

## 2020-05-19 RX ORDER — DIPHENHYDRAMINE HYDROCHLORIDE 50 MG/ML
50 INJECTION INTRAMUSCULAR; INTRAVENOUS ONCE AS NEEDED
Status: CANCELLED | OUTPATIENT
Start: 2020-05-20

## 2020-05-19 RX ORDER — HEPARIN 100 UNIT/ML
500 SYRINGE INTRAVENOUS
Status: CANCELLED | OUTPATIENT
Start: 2020-05-20

## 2020-05-19 RX ORDER — SODIUM CHLORIDE 0.9 % (FLUSH) 0.9 %
10 SYRINGE (ML) INJECTION
Status: CANCELLED | OUTPATIENT
Start: 2020-05-20

## 2020-05-19 RX ORDER — SODIUM CHLORIDE 0.9 % (FLUSH) 0.9 %
10 SYRINGE (ML) INJECTION
Status: CANCELLED | OUTPATIENT
Start: 2020-05-22

## 2020-05-19 RX ORDER — HEPARIN 100 UNIT/ML
500 SYRINGE INTRAVENOUS
Status: CANCELLED | OUTPATIENT
Start: 2020-05-22

## 2020-05-19 RX ORDER — EPINEPHRINE 0.3 MG/.3ML
0.3 INJECTION SUBCUTANEOUS ONCE AS NEEDED
Status: CANCELLED | OUTPATIENT
Start: 2020-05-20

## 2020-05-19 NOTE — PROGRESS NOTES
The patient location is:  private residence  The chief complaint leading to consultation is:  Stage III colon cancer  Visit type: Virtual visit with synchronous audio and video  Total time spent with patient:  Approximately 30 min total  Each patient to whom he or she provides medical services by telemedicine is:  (1) informed of the relationship between the physician and patient and the respective role of any other health care provider with respect to management of the patient; and (2) notified that he or she may decline to receive medical services by telemedicine and may withdraw from such care at any time.    Notes:     Subjective:         Patient ID: Gail Mckinnon is a 50 y.o. female.    Chief Complaint: Colon Cancer    49-year-old female who began having abdominal discomfort in June of 2019.  She was diagnosed with kidney stones and a CT scan showed some thickening in her colon.  She was then sent to see her PCP who recommended a colonoscopy.  The patient put this off until around January of 2020 when her PCP was adamant that she undergo further workup and she underwent colonoscopy in February of 2020.  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  The scans did not show obvious evidence of metastatic disease.  There was a small lesion in the liver that was concerning for a cyst in this will need to be followed closely.  She has healed from her surgery and she did have 37 lymph nodes removed of which 4 positive for cancer.    She completed cycle 1 the did have increased nausea for approximately 1 week following the chemotherapy.  This started on day 2 of her infusion.  Overall she states her weight is relatively stable and she denies any bleeding in her stool.  She denies fevers, chills or night sweats.   She continues to have some significant anxiety and insomnia and I am going to ask our psychologist to evaluate her.  She is taking BuSpar and Ativan periodically but states that these are not helping.  She did have some constipation following her 1st cycle and has been on MiraLax with some improvement.    Review of Systems   Constitutional: Negative for activity change, appetite change, chills, diaphoresis, fatigue, fever and unexpected weight change.   HENT: Negative for facial swelling, mouth sores and sore throat.    Eyes: Negative for visual disturbance.   Respiratory: Negative for apnea, cough, choking and shortness of breath.    Cardiovascular: Negative for chest pain and leg swelling.   Gastrointestinal: Positive for abdominal pain (improved). Negative for abdominal distention, blood in stool, constipation, diarrhea, nausea, rectal pain and vomiting.   Endocrine: Negative for cold intolerance.   Genitourinary: Negative for difficulty urinating, frequency, hematuria, vaginal bleeding and vaginal discharge.   Musculoskeletal: Negative for back pain, joint swelling and neck pain.   Skin: Negative for color change, rash and wound.   Neurological: Negative for dizziness, weakness, numbness and headaches.   Hematological: Negative for adenopathy. Does not bruise/bleed easily.   Psychiatric/Behavioral: Negative for agitation, confusion, decreased concentration and hallucinations. The patient is nervous/anxious. The patient is not hyperactive.          Past Medical History:   Past Medical History:   Diagnosis Date    Anxiety     Depression     FH: ovarian cancer 3/16/2020    Hyperthyroidism     Hypothyroid     Kidney calculi     Malignant neoplasm of sigmoid colon 3/16/2020    Menorrhagia     Multinodular goiter 2012    Palpitation     Venous insufficiency         Past Surgical History:   Past Surgical History:   Procedure Laterality Date     SECTION, CLASSIC      x3    COLONOSCOPY N/A  2/12/2020    Procedure: COLONOSCOPY;  Surgeon: Shane Parker MD;  Location: [facility] ENDO;  Service: Endoscopy;  Laterality: N/A;    CYSTOSCOPY W/ URETERAL STENT PLACEMENT Bilateral 3/25/2020    Procedure: CYSTOSCOPY, WITH URETERAL STENT INSERTION;  Surgeon: Claudio Tyson MD;  Location: [facility] OR [address] [place] [address];  Service: Urology;  Laterality: Bilateral;    INSERTION OF TUNNELED CENTRAL VENOUS CATHETER (CVC) WITH SUBCUTANEOUS PORT N/A 5/1/2020    Procedure: [id number];  [facility];  [facility];  Service: Radiology;  Laterality: N/A;  Room 189/Cindy    LAPAROSCOPIC SIGMOIDECTOMY N/A 3/25/2020    Procedure: COLECTOMY, SIGMOID, LAPAROSCOPIC, flex sig, ERAS high;  Surgeon: Silvio Man MD;  Location: [facility] OR [address] [place] [address];  Service: Colon and Rectal;  Laterality: N/A;    MOBILIZATION OF SPLENIC FLEXURE  3/25/2020    Procedure: MOBILIZATION, SPLENIC FLEXURE;  Surgeon: Silvio Man MD;  Location: [facility] OR [address] [place] [address];  Service: Colon and Rectal;;    tonsillectomy      TOTAL ABDOMINAL HYSTERECTOMY W/ BILATERAL SALPINGOOPHORECTOMY N/A 3/25/2020    Procedure: HYSTERECTOMY, TOTAL, ABDOMINAL, WITH BILATERAL SALPINGO-OOPHORECTOMY;  Surgeon: Keron Brady MD;  Location: [facility] OR [address] [place] [address];  Service: Oncology;  Laterality: N/A;        Family History:   Family History   Problem Relation Age of Onset    Heart disease Father     Diabetes Mother 65    Cancer Maternal Aunt         lung cancer    Cancer Maternal Grandmother         stomach cancer- started in ovaries    Ovarian cancer Maternal Grandmother         stomach cancer- started in ovaries    Colon cancer Paternal Uncle     Cancer Paternal Uncle     Ovarian cancer Maternal Aunt     Ovarian cancer Maternal Aunt     Ovarian cancer Cousin         mother had ovarian cancer    Uterine cancer Neg Hx     Breast cancer Neg Hx         Social History:   Social History     Tobacco Use    Smoking status: Never Smoker     Smokeless tobacco: Never Used   Substance Use Topics    Alcohol use: Yes     Comment: occasionally- twice monthly        Allergies:   Review of patient's allergies indicates:   Allergen Reactions    Penicillin g      unknown    Penicillins Hives     childhood allergy        Medications:     Current Outpatient Medications:     acetaminophen (TYLENOL) 500 MG tablet, Take 2 tablets (1,000 mg total) by mouth every 8 (eight) hours., Disp: 60 tablet, Rfl: 0    ascorbic acid, vitamin C, (VITAMIN C) 1000 MG tablet, Take 1,000 mg by mouth once daily., Disp: , Rfl:     buPROPion (WELLBUTRIN SR) 150 MG TBSR 12 hr tablet, Take 1 tablet (150 mg total) by mouth 2 (two) times daily., Disp: 180 tablet, Rfl: 0    busPIRone (BUSPAR) 10 MG tablet, Take 1 tablet (10 mg total) by mouth 2 (two) times daily., Disp: 180 tablet, Rfl: 0    cyclobenzaprine (FLEXERIL) 10 MG tablet, Take 1 tablet (10 mg total) by mouth nightly., Disp: 30 tablet, Rfl: 0    levothyroxine (SYNTHROID) 200 MCG tablet, TAKE 1 TABLET BY MOUTH ONCE DAILY BEFORE BREAKFAST, Disp: 90 tablet, Rfl: 3    lidocaine-prilocaine (EMLA) cream, Apply topically as needed., Disp: 25 g, Rfl: 0    LORazepam (ATIVAN) 0.5 MG tablet, Take 1 tablet (0.5 mg total) by mouth nightly as needed for Anxiety., Disp: 30 tablet, Rfl: 0    multivitamin (ONE DAILY MULTIVITAMIN) per tablet, Take 1 tablet by mouth once daily., Disp: , Rfl:     ondansetron (ZOFRAN) 4 MG tablet, Take 1 tablet (4 mg total) by mouth every 8 (eight) hours as needed for Nausea., Disp: 15 tablet, Rfl: 0    oxyCODONE (ROXICODONE) 10 mg Tab immediate release tablet, Take 1 tablet (10 mg total) by mouth every 4 (four) hours as needed. (Patient not taking: Reported on 5/6/2020), Disp: 40 tablet, Rfl: 0    traMADol (ULTRAM) 50 mg tablet, Take 1 tablet (50 mg total) by mouth 3 (three) times daily as needed for Pain., Disp: 90 tablet, Rfl: 2     Objective:      Physical Exam   Constitutional: She is oriented to  person, place, and time. She appears well-developed and well-nourished. No distress.   HENT:   Head: Normocephalic and atraumatic.   Eyes: Pupils are equal, round, and reactive to light. EOM are normal.   Neck: Normal range of motion. No tracheal deviation present.   Pulmonary/Chest: Effort normal. No stridor. No respiratory distress.   Musculoskeletal: Normal range of motion. She exhibits no deformity.   Neurological: She is alert and oriented to person, place, and time. No cranial nerve deficit.   Skin: She is not diaphoretic.   Psychiatric: She has a normal mood and affect. Her behavior is normal. Judgment and thought content normal.       Lab Visit on 05/18/2020   Component Date Value Ref Range Status    WBC 05/18/2020 4.83  3.90 - 12.70 K/uL Final    RBC 05/18/2020 4.37  4.00 - 5.40 M/uL Final    Hemoglobin 05/18/2020 10.3* 12.0 - 16.0 g/dL Final    Hematocrit 05/18/2020 35.4* 37.0 - 48.5 % Final    Mean Corpuscular Volume 05/18/2020 81* 82 - 98 fL Final    Mean Corpuscular Hemoglobin 05/18/2020 23.6* 27.0 - 31.0 pg Final    Mean Corpuscular Hemoglobin Conc 05/18/2020 29.1* 32.0 - 36.0 g/dL Final    RDW 05/18/2020 15.4* 11.5 - 14.5 % Final    Platelets 05/18/2020 392* 150 - 350 K/uL Final    MPV 05/18/2020 9.6  9.2 - 12.9 fL Final    Immature Granulocytes 05/18/2020 0.2  0.0 - 0.5 % Final    Gran # (ANC) 05/18/2020 2.3  1.8 - 7.7 K/uL Final    Immature Grans (Abs) 05/18/2020 0.01  0.00 - 0.04 K/uL Final    Comment: Mild elevation in immature granulocytes is non specific and   can be seen in a variety of conditions including stress response,   acute inflammation, trauma and pregnancy. Correlation with other   laboratory and clinical findings is essential.      Lymph # 05/18/2020 1.6  1.0 - 4.8 K/uL Final    Mono # 05/18/2020 0.5  0.3 - 1.0 K/uL Final    Eos # 05/18/2020 0.3  0.0 - 0.5 K/uL Final    Baso # 05/18/2020 0.03  0.00 - 0.20 K/uL Final    nRBC 05/18/2020 0  0 /100 WBC Final    Gran%  05/18/2020 47.5  38.0 - 73.0 % Final    Lymph% 05/18/2020 33.7  18.0 - 48.0 % Final    Mono% 05/18/2020 11.2  4.0 - 15.0 % Final    Eosinophil% 05/18/2020 6.8  0.0 - 8.0 % Final    Basophil% 05/18/2020 0.6  0.0 - 1.9 % Final    Differential Method 05/18/2020 Automated   Final    Sodium 05/18/2020 140  136 - 145 mmol/L Final    Potassium 05/18/2020 3.9  3.5 - 5.1 mmol/L Final    Chloride 05/18/2020 107  95 - 110 mmol/L Final    CO2 05/18/2020 25  22 - 31 mmol/L Final    Glucose 05/18/2020 122* 70 - 110 mg/dL Final    Comment: The ADA recommends the following guidelines for fasting glucose:  Normal:       less than 100 mg/dL  Prediabetes:  100 mg/dL to 125 mg/dL  Diabetes:     126 mg/dL or higher      BUN, Bld 05/18/2020 14  7 - 18 mg/dL Final    Creatinine 05/18/2020 0.77  0.50 - 1.40 mg/dL Final    Calcium 05/18/2020 10.7* 8.4 - 10.2 mg/dL Final    Total Protein 05/18/2020 7.2  6.0 - 8.4 g/dL Final    Albumin 05/18/2020 4.1  3.5 - 5.2 g/dL Final    Total Bilirubin 05/18/2020 0.2  0.2 - 1.3 mg/dL Final    Alkaline Phosphatase 05/18/2020 101  38 - 145 U/L Final    AST 05/18/2020 25  14 - 36 U/L Final    ALT 05/18/2020 23  0 - 35 U/L Final    Anion Gap 05/18/2020 8  8 - 16 mmol/L Final    eGFR if African American 05/18/2020 >60  >60 mL/min/1.73 m^2 Final    eGFR if non African American 05/18/2020 >60  >60 mL/min/1.73 m^2 Final    Comment: Calculation used to obtain the estimated glomerular filtration  rate (eGFR) is the CKD-EPI equation.      Lab Visit on 05/18/2020   Component Date Value Ref Range Status    SARS-CoV2 (COVID-19) Qualitative P* 05/18/2020 Not Detected  Not Detected Final    Comment: This test utilizes a real-time reverse transcription  polymerase chain reaction procedure to amplify and   detect the SARS-CoV-2 RdRp and N genes.    The analytical sensitivity (limit of detection) of   this assay is 100 copies/mL.   A Detected result is considered positive for COVID-19.  This patient  is considered infected with the   SARS-CoV-2 virus and is presumed to be contagious.    A Not Detected result means that SARS-CoV-2 RNA is not  present above the limit of detection. It does not rule  out the possibility of COVID-19 and should not be the  sole basis for treatment decisions.  If COVID-19 is   strongly suspected based on clinical and exposure   history,re-testing should be considered.    This test is only for use under Food and Drug   Administration s Emergency Use Authorization (EUA).   Commercial reagents are provided by [employer].  Performance characteristics of the EUA have been   independently verified by [facility]   Department of Pathology and [facility].           Assessment:       1. Malignant neoplasm of sigmoid colon    2. 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 Psychophysiological insomnia    4. Anxiety    5. Other constipation    6. Nausea          Plan:     50-year-old female with a [id number] poorly differentiated adenocarcinoma of the colon.      Plan:  1.  Stage III colon cancer:  I will proceed with cycle 2 of FOLFOX tomorrow  however secondary to the nausea I am going to add Emend to her regimen in addition to the Aloxi.  She should continue her home Zofran.    2.  Anxiety and insomnia:  She has been on BuSpar in the past and most recently Ativan but she states that these are not effective.  I am going to ask our psychologist talk with her in the immediate future to see if we can figure out a good regimen for her anxiety and insomnia.    3.  Constipation:  She and I did review some different regimens to try to help with this.  She is going to take a stool softener daily and continue the MiraLax as needed.  She is aware to contact us with any problems or worsening symptoms will certainly see her sooner, otherwise she will return in 2 weeks.

## 2020-05-19 NOTE — TELEPHONE ENCOUNTER
----- Message from MAXWELL Dove MD sent at 5/19/2020  2:41 PM CDT -----  Chemo tomorrow, RTC in two weeks with labs and likely chemo

## 2020-05-20 ENCOUNTER — DOCUMENTATION ONLY (OUTPATIENT)
Dept: INFUSION THERAPY | Facility: HOSPITAL | Age: 50
End: 2020-05-20

## 2020-05-20 ENCOUNTER — INFUSION (OUTPATIENT)
Dept: INFUSION THERAPY | Facility: HOSPITAL | Age: 50
End: 2020-05-20
Attending: INTERNAL MEDICINE
Payer: COMMERCIAL

## 2020-05-20 ENCOUNTER — PATIENT MESSAGE (OUTPATIENT)
Dept: HEMATOLOGY/ONCOLOGY | Facility: CLINIC | Age: 50
End: 2020-05-20

## 2020-05-20 VITALS
BODY MASS INDEX: 41.95 KG/M2 | RESPIRATION RATE: 18 BRPM | WEIGHT: 261 LBS | HEIGHT: 66 IN | SYSTOLIC BLOOD PRESSURE: 125 MMHG | DIASTOLIC BLOOD PRESSURE: 79 MMHG | TEMPERATURE: 98 F | HEART RATE: 74 BPM

## 2020-05-20 DIAGNOSIS — C77.2 METASTASIS TO INTESTINAL LYMPH NODE: Primary | ICD-10-CM

## 2020-05-20 DIAGNOSIS — C18.7 MALIGNANT NEOPLASM OF SIGMOID COLON: ICD-10-CM

## 2020-05-20 DIAGNOSIS — C18.7 MALIGNANT NEOPLASM OF SIGMOID COLON: Primary | ICD-10-CM

## 2020-05-20 PROCEDURE — 96416 CHEMO PROLONG INFUSE W/PUMP: CPT | Mod: PN

## 2020-05-20 PROCEDURE — 96375 TX/PRO/DX INJ NEW DRUG ADDON: CPT | Mod: PN

## 2020-05-20 PROCEDURE — 96411 CHEMO IV PUSH ADDL DRUG: CPT | Mod: PN

## 2020-05-20 PROCEDURE — 96367 TX/PROPH/DG ADDL SEQ IV INF: CPT | Mod: PN

## 2020-05-20 PROCEDURE — 25000003 PHARM REV CODE 250: Mod: PN | Performed by: INTERNAL MEDICINE

## 2020-05-20 PROCEDURE — 96413 CHEMO IV INFUSION 1 HR: CPT | Mod: PN

## 2020-05-20 PROCEDURE — 96368 THER/DIAG CONCURRENT INF: CPT | Mod: PN

## 2020-05-20 PROCEDURE — 63600175 PHARM REV CODE 636 W HCPCS: Mod: PN | Performed by: INTERNAL MEDICINE

## 2020-05-20 PROCEDURE — A4216 STERILE WATER/SALINE, 10 ML: HCPCS | Mod: PN | Performed by: INTERNAL MEDICINE

## 2020-05-20 PROCEDURE — 96415 CHEMO IV INFUSION ADDL HR: CPT | Mod: PN

## 2020-05-20 RX ORDER — HEPARIN 100 UNIT/ML
500 SYRINGE INTRAVENOUS
Status: DISCONTINUED | OUTPATIENT
Start: 2020-05-20 | End: 2020-05-20 | Stop reason: HOSPADM

## 2020-05-20 RX ORDER — DIPHENHYDRAMINE HYDROCHLORIDE 50 MG/ML
50 INJECTION INTRAMUSCULAR; INTRAVENOUS ONCE AS NEEDED
Status: DISCONTINUED | OUTPATIENT
Start: 2020-05-20 | End: 2020-05-20 | Stop reason: HOSPADM

## 2020-05-20 RX ORDER — EPINEPHRINE 0.3 MG/.3ML
0.3 INJECTION SUBCUTANEOUS ONCE AS NEEDED
Status: DISCONTINUED | OUTPATIENT
Start: 2020-05-20 | End: 2020-05-20 | Stop reason: HOSPADM

## 2020-05-20 RX ORDER — SODIUM CHLORIDE 0.9 % (FLUSH) 0.9 %
10 SYRINGE (ML) INJECTION
Status: DISCONTINUED | OUTPATIENT
Start: 2020-05-20 | End: 2020-05-20 | Stop reason: HOSPADM

## 2020-05-20 RX ORDER — CYCLOBENZAPRINE HCL 10 MG
10 TABLET ORAL NIGHTLY
Qty: 30 TABLET | Refills: 0 | Status: CANCELLED | OUTPATIENT
Start: 2020-05-20

## 2020-05-20 RX ORDER — FLUOROURACIL 50 MG/ML
400 INJECTION, SOLUTION INTRAVENOUS
Status: COMPLETED | OUTPATIENT
Start: 2020-05-20 | End: 2020-05-20

## 2020-05-20 RX ADMIN — LEUCOVORIN CALCIUM 900 MG: 350 INJECTION, POWDER, LYOPHILIZED, FOR SOLUTION INTRAMUSCULAR; INTRAVENOUS at 10:05

## 2020-05-20 RX ADMIN — DEXTROSE MONOHYDRATE: 50 INJECTION, SOLUTION INTRAVENOUS at 10:05

## 2020-05-20 RX ADMIN — OXALIPLATIN 200 MG: 50 INJECTION, SOLUTION, CONCENTRATE INTRAVENOUS at 10:05

## 2020-05-20 RX ADMIN — FLUOROURACIL 945 MG: 50 INJECTION, SOLUTION INTRAVENOUS at 12:05

## 2020-05-20 RX ADMIN — APREPITANT 130 MG: 130 INJECTION, EMULSION INTRAVENOUS at 10:05

## 2020-05-20 RX ADMIN — FLUOROURACIL 5665 MG: 50 INJECTION, SOLUTION INTRAVENOUS at 01:05

## 2020-05-20 RX ADMIN — DEXAMETHASONE SODIUM PHOSPHATE: 4 INJECTION, SOLUTION INTRA-ARTICULAR; INTRALESIONAL; INTRAMUSCULAR; INTRAVENOUS; SOFT TISSUE at 10:05

## 2020-05-20 RX ADMIN — Medication 10 ML: at 12:05

## 2020-05-20 NOTE — PROGRESS NOTES
Met with pt during chemo to address psychosocial concerns. Pt endorses elevated distress related to emotional concerns. SW provided supportive counseling. Encouraged pt to follow through with plan to meet with psychologist to find appropriate dosage of anti-depressant and to develop new cognitive behavioral skills to elevate mood and decrease anxiety. Provided referrals to [facility] and [facility]. Provided [facility]. Provided contact information and encouraged pt to contact for continued psychosocial support. Mamta Philippe LCSW,OSW-C

## 2020-05-21 RX ORDER — CYCLOBENZAPRINE HCL 10 MG
10 TABLET ORAL NIGHTLY
Qty: 30 TABLET | Refills: 0 | Status: SHIPPED | OUTPATIENT
Start: 2020-05-21 | End: 2020-12-16 | Stop reason: SDUPTHER

## 2020-05-22 ENCOUNTER — INFUSION (OUTPATIENT)
Dept: INFUSION THERAPY | Facility: HOSPITAL | Age: 50
End: 2020-05-22
Attending: INTERNAL MEDICINE
Payer: COMMERCIAL

## 2020-05-22 DIAGNOSIS — C18.7 MALIGNANT NEOPLASM OF SIGMOID COLON: ICD-10-CM

## 2020-05-22 DIAGNOSIS — C77.2 METASTASIS TO INTESTINAL LYMPH NODE: Primary | ICD-10-CM

## 2020-05-22 PROCEDURE — 25000003 PHARM REV CODE 250: Mod: PN | Performed by: INTERNAL MEDICINE

## 2020-05-22 PROCEDURE — A4216 STERILE WATER/SALINE, 10 ML: HCPCS | Mod: PN | Performed by: INTERNAL MEDICINE

## 2020-05-22 PROCEDURE — 96523 IRRIG DRUG DELIVERY DEVICE: CPT | Mod: PN

## 2020-05-22 PROCEDURE — 63600175 PHARM REV CODE 636 W HCPCS: Mod: PN | Performed by: INTERNAL MEDICINE

## 2020-05-22 RX ORDER — HEPARIN 100 UNIT/ML
500 SYRINGE INTRAVENOUS
Status: DISCONTINUED | OUTPATIENT
Start: 2020-05-22 | End: 2020-05-22 | Stop reason: HOSPADM

## 2020-05-22 RX ORDER — SODIUM CHLORIDE 0.9 % (FLUSH) 0.9 %
10 SYRINGE (ML) INJECTION
Status: DISCONTINUED | OUTPATIENT
Start: 2020-05-22 | End: 2020-05-22 | Stop reason: HOSPADM

## 2020-05-22 RX ADMIN — HEPARIN 500 UNITS: 100 SYRINGE at 12:05

## 2020-05-22 RX ADMIN — Medication 10 ML: at 12:05

## 2020-05-24 ENCOUNTER — PATIENT MESSAGE (OUTPATIENT)
Dept: SURGERY | Facility: CLINIC | Age: 50
End: 2020-05-24

## 2020-05-27 DIAGNOSIS — Z13.9 SCREENING FOR CONDITION: Primary | ICD-10-CM

## 2020-06-01 ENCOUNTER — LAB VISIT (OUTPATIENT)
Dept: INFUSION THERAPY | Facility: HOSPITAL | Age: 50
End: 2020-06-01
Attending: INTERNAL MEDICINE
Payer: COMMERCIAL

## 2020-06-01 ENCOUNTER — TELEPHONE (OUTPATIENT)
Dept: HEMATOLOGY/ONCOLOGY | Facility: CLINIC | Age: 50
End: 2020-06-01

## 2020-06-01 ENCOUNTER — LAB VISIT (OUTPATIENT)
Dept: LAB | Facility: HOSPITAL | Age: 50
End: 2020-06-01
Attending: INTERNAL MEDICINE
Payer: COMMERCIAL

## 2020-06-01 DIAGNOSIS — C77.2 METASTASIS TO INTESTINAL LYMPH NODE: ICD-10-CM

## 2020-06-01 DIAGNOSIS — C18.7 MALIGNANT NEOPLASM OF SIGMOID COLON: ICD-10-CM

## 2020-06-01 DIAGNOSIS — Z13.9 SCREENING FOR CONDITION: ICD-10-CM

## 2020-06-01 LAB
ALBUMIN SERPL BCP-MCNC: 4.2 G/DL (ref 3.5–5.2)
ALP SERPL-CCNC: 130 U/L (ref 38–145)
ALT SERPL W/O P-5'-P-CCNC: 24 U/L (ref 0–35)
ANION GAP SERPL CALC-SCNC: 5 MMOL/L (ref 8–16)
AST SERPL-CCNC: 26 U/L (ref 14–36)
BASOPHILS # BLD AUTO: 0.04 K/UL (ref 0–0.2)
BASOPHILS NFR BLD: 0.8 % (ref 0–1.9)
BILIRUB SERPL-MCNC: 0.2 MG/DL (ref 0.2–1.3)
BUN SERPL-MCNC: 12 MG/DL (ref 7–18)
CALCIUM SERPL-MCNC: 11.1 MG/DL (ref 8.4–10.2)
CHLORIDE SERPL-SCNC: 109 MMOL/L (ref 95–110)
CO2 SERPL-SCNC: 27 MMOL/L (ref 22–31)
CREAT SERPL-MCNC: 0.8 MG/DL (ref 0.5–1.4)
DIFFERENTIAL METHOD: ABNORMAL
EOSINOPHIL # BLD AUTO: 0.3 K/UL (ref 0–0.5)
EOSINOPHIL NFR BLD: 5.8 % (ref 0–8)
ERYTHROCYTE [DISTWIDTH] IN BLOOD BY AUTOMATED COUNT: 15.9 % (ref 11.5–14.5)
EST. GFR  (AFRICAN AMERICAN): >60 ML/MIN/1.73 M^2
EST. GFR  (NON AFRICAN AMERICAN): >60 ML/MIN/1.73 M^2
GLUCOSE SERPL-MCNC: 104 MG/DL (ref 70–110)
HCT VFR BLD AUTO: 37.3 % (ref 37–48.5)
HGB BLD-MCNC: 10.8 G/DL (ref 12–16)
IMM GRANULOCYTES # BLD AUTO: 0 K/UL (ref 0–0.04)
IMM GRANULOCYTES NFR BLD AUTO: 0 % (ref 0–0.5)
LYMPHOCYTES # BLD AUTO: 1.7 K/UL (ref 1–4.8)
LYMPHOCYTES NFR BLD: 34 % (ref 18–48)
MCH RBC QN AUTO: 23.1 PG (ref 27–31)
MCHC RBC AUTO-ENTMCNC: 29 G/DL (ref 32–36)
MCV RBC AUTO: 80 FL (ref 82–98)
MONOCYTES # BLD AUTO: 0.7 K/UL (ref 0.3–1)
MONOCYTES NFR BLD: 14 % (ref 4–15)
NEUTROPHILS # BLD AUTO: 2.2 K/UL (ref 1.8–7.7)
NEUTROPHILS NFR BLD: 45.4 % (ref 38–73)
NRBC BLD-RTO: 0 /100 WBC
PLATELET # BLD AUTO: 336 K/UL (ref 150–350)
PMV BLD AUTO: 9.4 FL (ref 9.2–12.9)
POTASSIUM SERPL-SCNC: 4.3 MMOL/L (ref 3.5–5.1)
PROT SERPL-MCNC: 7.3 G/DL (ref 6–8.4)
RBC # BLD AUTO: 4.67 M/UL (ref 4–5.4)
SODIUM SERPL-SCNC: 141 MMOL/L (ref 136–145)
WBC # BLD AUTO: 4.86 K/UL (ref 3.9–12.7)

## 2020-06-01 PROCEDURE — 36415 COLL VENOUS BLD VENIPUNCTURE: CPT | Mod: PN

## 2020-06-01 PROCEDURE — 80053 COMPREHEN METABOLIC PANEL: CPT | Mod: PN

## 2020-06-01 PROCEDURE — 80053 COMPREHEN METABOLIC PANEL: CPT

## 2020-06-01 PROCEDURE — U0003 INFECTIOUS AGENT DETECTION BY NUCLEIC ACID (DNA OR RNA); SEVERE ACUTE RESPIRATORY SYNDROME CORONAVIRUS 2 (SARS-COV-2) (CORONAVIRUS DISEASE [COVID-19]), AMPLIFIED PROBE TECHNIQUE, MAKING USE OF HIGH THROUGHPUT TECHNOLOGIES AS DESCRIBED BY CMS-2020-01-R: HCPCS

## 2020-06-01 PROCEDURE — 85025 COMPLETE CBC W/AUTO DIFF WBC: CPT | Mod: PN

## 2020-06-01 PROCEDURE — 85025 COMPLETE CBC W/AUTO DIFF WBC: CPT

## 2020-06-02 ENCOUNTER — OFFICE VISIT (OUTPATIENT)
Dept: HEMATOLOGY/ONCOLOGY | Facility: CLINIC | Age: 50
End: 2020-06-02
Payer: COMMERCIAL

## 2020-06-02 DIAGNOSIS — C77.2 METASTASIS TO INTESTINAL LYMPH NODE: ICD-10-CM

## 2020-06-02 DIAGNOSIS — C18.7 MALIGNANT NEOPLASM OF SIGMOID COLON: Primary | ICD-10-CM

## 2020-06-02 DIAGNOSIS — G47.00 INSOMNIA, UNSPECIFIED TYPE: ICD-10-CM

## 2020-06-02 DIAGNOSIS — Z13.9 SCREENING FOR CONDITION: ICD-10-CM

## 2020-06-02 DIAGNOSIS — F41.9 ANXIETY: ICD-10-CM

## 2020-06-02 DIAGNOSIS — G47.00 INSOMNIA, UNSPECIFIED TYPE: Primary | ICD-10-CM

## 2020-06-02 DIAGNOSIS — K59.09 OTHER CONSTIPATION: ICD-10-CM

## 2020-06-02 LAB — SARS-COV-2 RNA RESP QL NAA+PROBE: NOT DETECTED

## 2020-06-02 PROCEDURE — 99214 PR OFFICE/OUTPT VISIT, EST, LEVL IV, 30-39 MIN: ICD-10-PCS | Mod: 95,,, | Performed by: PHYSICIAN ASSISTANT

## 2020-06-02 PROCEDURE — 99214 OFFICE O/P EST MOD 30 MIN: CPT | Mod: 95,,, | Performed by: PHYSICIAN ASSISTANT

## 2020-06-02 RX ORDER — TRAZODONE HYDROCHLORIDE 50 MG/1
50 TABLET ORAL NIGHTLY
Qty: 30 TABLET | Refills: 2 | Status: SHIPPED | OUTPATIENT
Start: 2020-06-02 | End: 2022-11-29 | Stop reason: SDUPTHER

## 2020-06-02 NOTE — PROGRESS NOTES
Subjective:       Patient ID: Gail Mckinnon is a 50 y.o. female.    Chief Complaint: Colon cancer    The patient location is:  private residence  The chief complaint leading to consultation is:  Stage III colon cancer  Visit type: Virtual visit with synchronous audio and video  Total time spent with patient:  Approximately 30 min total  Each patient to whom he or she provides medical services by telemedicine is:  (1) informed of the relationship between the physician and patient and the respective role of any other health care provider with respect to management of the patient; and (2) notified that he or she may decline to receive medical services by telemedicine and may withdraw from such care at any time.     Notes:     History:  Past Medical History:   Diagnosis Date    Anxiety     Depression     FH: ovarian cancer 3/16/2020    Hyperthyroidism     Hypothyroid     Kidney calculi     Malignant neoplasm of sigmoid colon 3/16/2020    Menorrhagia     Multinodular goiter 2012    Palpitation     Venous insufficiency      Past Surgical History:   Procedure Laterality Date     SECTION, CLASSIC      x3    COLONOSCOPY N/A 2020    Procedure: COLONOSCOPY;  Surgeon: Shane Parker MD;  Location: [facility];  Service: Endoscopy;  Laterality: N/A;    CYSTOSCOPY W/ URETERAL STENT PLACEMENT Bilateral 3/25/2020    Procedure: CYSTOSCOPY, WITH URETERAL STENT INSERTION;  Surgeon: Claudio Tyson MD;  Location: [address];  Service: Urology;  Laterality: Bilateral;    INSERTION OF TUNNELED CENTRAL VENOUS CATHETER (CVC) WITH SUBCUTANEOUS PORT N/A 2020    Procedure: [id number]-KPCE-U-CATH;  Surgeon: Stephen Diagnostic Provider;  Location: [facility] OR [address] [place] [address];  Service: Radiology;  Laterality: N/A;  Room [facility]    LAPAROSCOPIC SIGMOIDECTOMY N/A 3/25/2020    Procedure: COLECTOMY, SIGMOID, LAPAROSCOPIC, flex sig, ERAS high;  Surgeon: Silvio Man MD;  Location: [facility] OR [address] [place] [address];   Service: Colon and Rectal;  Laterality: N/A;    MOBILIZATION OF SPLENIC FLEXURE  3/25/2020    Procedure: MOBILIZATION, SPLENIC FLEXURE;  Surgeon: Silvio Man MD;  [facility];  Service: Colon and Rectal;;    tonsillectomy      TOTAL ABDOMINAL HYSTERECTOMY W/ BILATERAL SALPINGOOPHORECTOMY N/A 3/25/2020    Procedure: HYSTERECTOMY, TOTAL, ABDOMINAL, WITH BILATERAL SALPINGO-OOPHORECTOMY;  Surgeon: Keron Brady MD;  [facility];  Service: Oncology;  Laterality: N/A;         Malignant neoplasm of sigmoid colon    3/16/2020 Initial Diagnosis     Malignant neoplasm of sigmoid colon      3/31/2020 Cancer Staged     Staging form: Colon and Rectum, AJCC 8th Edition  - Clinical stage from 3/31/2020: Stage IIIC (cT4b, cN2a, cM0)      5/6/2020 -  Chemotherapy     Treatment Summary   Plan Name: OP FOLFOX 6 Q2W  Treatment Goal: Curative  Status: Active  Start Date: 5/6/2020  End Date: 10/9/2020 (Planned)  Provider: MAXWELL Dove MD  Chemotherapy: fluorouraciL injection 945 mg, 400 mg/m2 = 945 mg, Intravenous, Clinic/HOD 1 time, 2 of 12 cycles  Administration: 945 mg (5/6/2020), 945 mg (5/20/2020)  fluorouraciL 2,400 mg/m2 = 5,665 mg in sodium chloride 0.9% 240 mL chemo infusion, 2,400 mg/m2 = 5,665 mg, Intravenous, Over 46 hours, 2 of 12 cycles  Administration: 5,665 mg (5/6/2020), 5,665 mg (5/20/2020)  leucovorin calcium 900 mg in dextrose 5 % 250 mL infusion, 945 mg, Intravenous, Clinic/HOD 1 time, 2 of 12 cycles  Administration: 900 mg (5/6/2020), 900 mg (5/20/2020)  oxaliplatin (ELOXATIN) 200 mg in dextrose 5 % 500 mL chemo infusion, 201 mg, Intravenous, Clinic/HOD 1 time, 2 of 12 cycles  Administration: 200 mg (5/6/2020), 200 mg (5/20/2020)        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    4/3/2020 Initial Diagnosis     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      5/6/2020 -  Chemotherapy     Treatment Summary   Plan Name: OP FOLFOX 6 Q2W  Treatment Goal: Curative  Status:  Active  Start Date: 5/6/2020  End Date: 10/9/2020 (Planned)  Provider: MAXWELL Dove MD  Chemotherapy: fluorouraciL injection 945 mg, 400 mg/m2 = 945 mg, Intravenous, Clinic/HOD 1 time, 2 of 12 cycles  Administration: 945 mg (5/6/2020), 945 mg (5/20/2020)  fluorouraciL 2,400 mg/m2 = 5,665 mg in sodium chloride 0.9% 240 mL chemo infusion, 2,400 mg/m2 = 5,665 mg, Intravenous, Over 46 hours, 2 of 12 cycles  Administration: 5,665 mg (5/6/2020), 5,665 mg (5/20/2020)  leucovorin calcium 900 mg in dextrose 5 % 250 mL infusion, 945 mg, Intravenous, Clinic/HOD 1 time, 2 of 12 cycles  Administration: 900 mg (5/6/2020), 900 mg (5/20/2020)  oxaliplatin (ELOXATIN) 200 mg in dextrose 5 % 500 mL chemo infusion, 201 mg, Intravenous, Clinic/HOD 1 time, 2 of 12 cycles  Administration: 200 mg (5/6/2020), 200 mg (5/20/2020)          Allergies:  Review of patient's allergies indicates:   Allergen Reactions    Penicillin g      unknown    Penicillins Hives     childhood allergy        HPI 49-year-old female who began having abdominal discomfort in June of 2019.  She was diagnosed with kidney stones and a CT scan showed some thickening in her colon.  She was then sent to see her PCP who recommended a colonoscopy.  The patient put this off until around January of 2020 when her PCP was adamant that she undergo further workup and she underwent colonoscopy in February of 2020.  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  The scans did not show obvious evidence of metastatic disease.  There was a small lesion in the liver that was concerning for a cyst in this will need to be followed closely.  She has healed from her surgery and she did have 37 lymph nodes removed of which 4 positive for cancer.     She completed cycle 2 the did have  increased nausea for approximately 1 week following the chemotherapy.  This started on day 2 of her infusion. Her nausea medications were then adjusted. During this previous infusion, she noted a significant improvement in her symptoms. Her nausea was minimal and controlled with Zofran. He is not having any abdominal pain or vomiting. Her constipation has also improved with the use of the Miralax and stool softener. Overall she states her weight is relatively stable and she denies any bleeding in her stool.  She denies fevers, shortness of breath, dizziness, worsening neuropathy, chest pain, cough, chills or night sweats.  She continues to have some significant anxiety and insomnia. We did recommend that she meet with our Psychologist to help with these symptoms.  She is taking BuSpar and Ativan periodically but states that these are not helping.    Overall, she is feeling fairly. Lab work has been reviewed. COVID testing is pending.   ECOG:  ECOG SCORE            Review of Systems   Constitutional: Negative for appetite change, chills, fatigue, fever and unexpected weight change.   HENT: Negative for congestion, facial swelling, mouth sores, sinus pressure, sinus pain, sore throat and trouble swallowing.    Eyes: Negative for photophobia, pain and visual disturbance.   Respiratory: Negative for cough, chest tightness, shortness of breath, wheezing and stridor.    Cardiovascular: Negative for chest pain and leg swelling.   Gastrointestinal: Negative for abdominal distention, abdominal pain, blood in stool, constipation, diarrhea, nausea, rectal pain and vomiting.   Genitourinary: Negative for dysuria, flank pain and urgency.   Musculoskeletal: Negative for back pain, gait problem, joint swelling and neck pain.   Skin: Negative for color change and rash.   Neurological: Negative for dizziness, syncope, weakness, light-headedness, numbness and headaches.   Hematological: Negative for adenopathy. Does not bruise/bleed  easily.   Psychiatric/Behavioral: Negative for agitation, behavioral problems and confusion. The patient is not nervous/anxious.        Objective:      Physical Exam   Constitutional: She is oriented to person, place, and time. She appears well-developed and well-nourished. No distress.   General physical exam was limited due to virtual visit presentation.    HENT:   Head: Normocephalic.   Eyes: No scleral icterus.   Neck: No tracheal deviation present.   Pulmonary/Chest: Effort normal.   Speech is fluent without evidence for respiratory compromise.   Musculoskeletal: She exhibits no deformity.   Neurological: She is alert and oriented to person, place, and time.   Skin: No rash noted. She is not diaphoretic.   Psychiatric: She has a normal mood and affect. Her behavior is normal. Judgment and thought content normal.   Nursing note and vitals reviewed.      Results:   Contains abnormal data CBC auto differential   Order: [id number]   Status:  Final result   Visible to patient:  Yes (Patient Portal)   Next appt:  06/03/2020 at 09:30 AM in Chemotherapy ([address], [facility])   Dx:  Malignant neoplasm of sigmoid colon; ...    Ref Range & Units 1d ago   WBC 3.90 - 12.70 K/uL 4.86    RBC 4.00 - 5.40 M/uL 4.67    Hemoglobin 12.0 - 16.0 g/dL 10.8Low     Hematocrit 37.0 - 48.5 % 37.3    Mean Corpuscular Volume 82 - 98 fL 80Low     Mean Corpuscular Hemoglobin 27.0 - 31.0 pg 23.1Low     Mean Corpuscular Hemoglobin Conc 32.0 - 36.0 g/dL 29.0Low     RDW 11.5 - 14.5 % 15.9High     Platelets 150 - 350 K/uL 336    MPV 9.2 - 12.9 fL 9.4    Immature Granulocytes 0.0 - 0.5 % 0.0    Gran # (ANC) 1.8 - 7.7 K/uL 2.2    Immature Grans (Abs) 0.00 - 0.04 K/uL 0.00    Comment: Mild elevation in immature granulocytes is non specific and   can be seen in a variety of conditions including stress response,   acute inflammation, trauma and pregnancy. Correlation with other   laboratory and clinical findings is essential.    Lymph # 1.0 -  4.8 K/uL 1.7    Mono # 0.3 - 1.0 K/uL 0.7    Eos # 0.0 - 0.5 K/uL 0.3    Baso # 0.00 - 0.20 K/uL 0.04    nRBC 0 /100 WBC 0    Gran% 38.0 - 73.0 % 45.4    Lymph% 18.0 - 48.0 % 34.0    Mono% 4.0 - 15.0 % 14.0    Eosinophil% 0.0 - 8.0 % 5.8    Basophil% 0.0 - 1.9 % 0.8            Comprehensive metabolic panel   Order: [id number]   Status:  Final result   Visible to patient:  Yes (Patient Portal)   Next appt:  06/03/2020 at 09:30 AM in Chemotherapy ([address], [facility])   Dx:  Malignant neoplasm of sigmoid colon; ...    Ref Range & Units 1d ago   Sodium 136 - 145 mmol/L 141    Potassium 3.5 - 5.1 mmol/L 4.3    Chloride 95 - 110 mmol/L 109    CO2 22 - 31 mmol/L 27    Glucose 70 - 110 mg/dL 104    Comment: The ADA recommends the following guidelines for fasting glucose:   Normal:       less than 100 mg/dL   Prediabetes:  100 mg/dL to 125 mg/dL   Diabetes:     126 mg/dL or higher    BUN, Bld 7 - 18 mg/dL 12    Creatinine 0.50 - 1.40 mg/dL 0.80    Calcium 8.4 - 10.2 mg/dL 11.1High     Total Protein 6.0 - 8.4 g/dL 7.3    Albumin 3.5 - 5.2 g/dL 4.2    Total Bilirubin 0.2 - 1.3 mg/dL 0.2    Alkaline Phosphatase 38 - 145 U/L 130    AST 14 - 36 U/L 26    ALT 0 - 35 U/L 24    Anion Gap 8 - 16 mmol/L 5Low     eGFR if African American >60 mL/min/1.73 m^2 >60    eGFR if non African American >60 mL/min/1.73 m^2 >60    Comment: Calculation used to obtain the estimated glomerular filtration   rate (eGFR) is the CKD-EPI equation.             Assessment:     1. Malignant neoplasm of sigmoid colon    2. 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 Psychophysiological insomnia    4. Anxiety    5. Other constipation    6. Nausea      Plan:   Plan: COVID-19 test pending    1,2,6.  Stage III colon cancer:  She will proceed with cycle 3 of FOLFOX tomorrow. We will continue with adding Emend to her regimen in addition to the Aloxi.  She should continue her home Zofran.       3,4.  Anxiety and insomnia:  She has been on BuSpar in the past  and most recently Ativan but she states that these are not effective. We will try her on a low dose of Trazodone to take only at night and monitor her symptoms. We will also pursue having her see our Psychologist to talk with her in the immediate future to see if we can figure out a good regimen for her anxiety and insomnia. Potential side effect interactions were discussed with the patient.      5.  Constipation: She will continue to take a stool softener daily and continue the MiraLax as needed.  She is aware to contact us with any problems or worsening symptoms will certainly see her sooner, otherwise she will return in 2 weeks.    Pte displayed understanding of the above encounter and treatment plan. All thoughtful questions were answered to their satisfaction. Pte was advised to notify the care team or proceed to the ER if signs and symptoms worsen.

## 2020-06-03 ENCOUNTER — INFUSION (OUTPATIENT)
Dept: INFUSION THERAPY | Facility: HOSPITAL | Age: 50
End: 2020-06-03
Attending: INTERNAL MEDICINE
Payer: COMMERCIAL

## 2020-06-03 VITALS
RESPIRATION RATE: 18 BRPM | BODY MASS INDEX: 41.99 KG/M2 | SYSTOLIC BLOOD PRESSURE: 136 MMHG | TEMPERATURE: 98 F | DIASTOLIC BLOOD PRESSURE: 84 MMHG | HEIGHT: 66 IN | HEART RATE: 81 BPM | WEIGHT: 261.25 LBS

## 2020-06-03 DIAGNOSIS — C77.2 METASTASIS TO INTESTINAL LYMPH NODE: Primary | ICD-10-CM

## 2020-06-03 DIAGNOSIS — C18.7 MALIGNANT NEOPLASM OF SIGMOID COLON: ICD-10-CM

## 2020-06-03 PROCEDURE — 25000003 PHARM REV CODE 250: Mod: PN | Performed by: INTERNAL MEDICINE

## 2020-06-03 PROCEDURE — 96375 TX/PRO/DX INJ NEW DRUG ADDON: CPT | Mod: PN

## 2020-06-03 PROCEDURE — 96368 THER/DIAG CONCURRENT INF: CPT | Mod: PN

## 2020-06-03 PROCEDURE — 96367 TX/PROPH/DG ADDL SEQ IV INF: CPT | Mod: PN

## 2020-06-03 PROCEDURE — 96411 CHEMO IV PUSH ADDL DRUG: CPT | Mod: PN

## 2020-06-03 PROCEDURE — A4216 STERILE WATER/SALINE, 10 ML: HCPCS | Mod: PN | Performed by: INTERNAL MEDICINE

## 2020-06-03 PROCEDURE — 96415 CHEMO IV INFUSION ADDL HR: CPT | Mod: PN

## 2020-06-03 PROCEDURE — 96416 CHEMO PROLONG INFUSE W/PUMP: CPT | Mod: PN

## 2020-06-03 PROCEDURE — 63600175 PHARM REV CODE 636 W HCPCS: Mod: PN | Performed by: INTERNAL MEDICINE

## 2020-06-03 PROCEDURE — 96413 CHEMO IV INFUSION 1 HR: CPT | Mod: PN

## 2020-06-03 RX ORDER — HEPARIN 100 UNIT/ML
500 SYRINGE INTRAVENOUS
Status: DISCONTINUED | OUTPATIENT
Start: 2020-06-03 | End: 2020-06-03 | Stop reason: HOSPADM

## 2020-06-03 RX ORDER — DIPHENHYDRAMINE HYDROCHLORIDE 50 MG/ML
50 INJECTION INTRAMUSCULAR; INTRAVENOUS ONCE AS NEEDED
Status: DISCONTINUED | OUTPATIENT
Start: 2020-06-03 | End: 2020-06-03 | Stop reason: HOSPADM

## 2020-06-03 RX ORDER — EPINEPHRINE 0.3 MG/.3ML
0.3 INJECTION SUBCUTANEOUS ONCE AS NEEDED
Status: CANCELLED | OUTPATIENT
Start: 2020-06-03

## 2020-06-03 RX ORDER — HEPARIN 100 UNIT/ML
500 SYRINGE INTRAVENOUS
Status: CANCELLED | OUTPATIENT
Start: 2020-06-05

## 2020-06-03 RX ORDER — SODIUM CHLORIDE 0.9 % (FLUSH) 0.9 %
10 SYRINGE (ML) INJECTION
Status: CANCELLED | OUTPATIENT
Start: 2020-06-03

## 2020-06-03 RX ORDER — HEPARIN 100 UNIT/ML
500 SYRINGE INTRAVENOUS
Status: CANCELLED | OUTPATIENT
Start: 2020-06-03

## 2020-06-03 RX ORDER — EPINEPHRINE 0.3 MG/.3ML
0.3 INJECTION SUBCUTANEOUS ONCE AS NEEDED
Status: DISCONTINUED | OUTPATIENT
Start: 2020-06-03 | End: 2020-06-03 | Stop reason: HOSPADM

## 2020-06-03 RX ORDER — DIPHENHYDRAMINE HYDROCHLORIDE 50 MG/ML
50 INJECTION INTRAMUSCULAR; INTRAVENOUS ONCE AS NEEDED
Status: CANCELLED | OUTPATIENT
Start: 2020-06-03

## 2020-06-03 RX ORDER — FLUOROURACIL 50 MG/ML
400 INJECTION, SOLUTION INTRAVENOUS
Status: CANCELLED | OUTPATIENT
Start: 2020-06-03

## 2020-06-03 RX ORDER — SODIUM CHLORIDE 0.9 % (FLUSH) 0.9 %
10 SYRINGE (ML) INJECTION
Status: DISCONTINUED | OUTPATIENT
Start: 2020-06-03 | End: 2020-06-03 | Stop reason: HOSPADM

## 2020-06-03 RX ORDER — SODIUM CHLORIDE 0.9 % (FLUSH) 0.9 %
10 SYRINGE (ML) INJECTION
Status: CANCELLED | OUTPATIENT
Start: 2020-06-05

## 2020-06-03 RX ORDER — FLUOROURACIL 50 MG/ML
400 INJECTION, SOLUTION INTRAVENOUS
Status: COMPLETED | OUTPATIENT
Start: 2020-06-03 | End: 2020-06-03

## 2020-06-03 RX ADMIN — Medication 10 ML: at 12:06

## 2020-06-03 RX ADMIN — FLUOROURACIL 5665 MG: 50 INJECTION, SOLUTION INTRAVENOUS at 12:06

## 2020-06-03 RX ADMIN — FLUOROURACIL 945 MG: 50 INJECTION, SOLUTION INTRAVENOUS at 12:06

## 2020-06-03 RX ADMIN — DEXAMETHASONE SODIUM PHOSPHATE: 4 INJECTION, SOLUTION INTRA-ARTICULAR; INTRALESIONAL; INTRAMUSCULAR; INTRAVENOUS; SOFT TISSUE at 09:06

## 2020-06-03 RX ADMIN — DEXTROSE MONOHYDRATE: 50 INJECTION, SOLUTION INTRAVENOUS at 10:06

## 2020-06-03 RX ADMIN — LEUCOVORIN CALCIUM 900 MG: 350 INJECTION, POWDER, LYOPHILIZED, FOR SOLUTION INTRAMUSCULAR; INTRAVENOUS at 10:06

## 2020-06-03 RX ADMIN — OXALIPLATIN 200 MG: 50 INJECTION, SOLUTION, CONCENTRATE INTRAVENOUS at 10:06

## 2020-06-03 RX ADMIN — SODIUM CHLORIDE: 9 INJECTION, SOLUTION INTRAVENOUS at 09:06

## 2020-06-03 RX ADMIN — APREPITANT 130 MG: 130 INJECTION, EMULSION INTRAVENOUS at 09:06

## 2020-06-03 NOTE — PLAN OF CARE
Pt receiving folfox on shift. Pt c/o occasional nausea cold neuropathy and generalized fatigue. Will continue to monitor

## 2020-06-05 ENCOUNTER — INFUSION (OUTPATIENT)
Dept: INFUSION THERAPY | Facility: HOSPITAL | Age: 50
End: 2020-06-05
Attending: INTERNAL MEDICINE
Payer: COMMERCIAL

## 2020-06-05 VITALS
RESPIRATION RATE: 18 BRPM | SYSTOLIC BLOOD PRESSURE: 120 MMHG | DIASTOLIC BLOOD PRESSURE: 71 MMHG | TEMPERATURE: 98 F | HEART RATE: 90 BPM

## 2020-06-05 DIAGNOSIS — C77.2 METASTASIS TO INTESTINAL LYMPH NODE: Primary | ICD-10-CM

## 2020-06-05 DIAGNOSIS — C18.7 MALIGNANT NEOPLASM OF SIGMOID COLON: ICD-10-CM

## 2020-06-05 PROCEDURE — 63600175 PHARM REV CODE 636 W HCPCS: Mod: PN | Performed by: INTERNAL MEDICINE

## 2020-06-05 PROCEDURE — 25000003 PHARM REV CODE 250: Mod: PN | Performed by: INTERNAL MEDICINE

## 2020-06-05 PROCEDURE — 96523 IRRIG DRUG DELIVERY DEVICE: CPT | Mod: PN

## 2020-06-05 PROCEDURE — A4216 STERILE WATER/SALINE, 10 ML: HCPCS | Mod: PN | Performed by: INTERNAL MEDICINE

## 2020-06-05 RX ORDER — SODIUM CHLORIDE 0.9 % (FLUSH) 0.9 %
10 SYRINGE (ML) INJECTION
Status: DISCONTINUED | OUTPATIENT
Start: 2020-06-05 | End: 2020-06-05 | Stop reason: HOSPADM

## 2020-06-05 RX ORDER — HEPARIN 100 UNIT/ML
500 SYRINGE INTRAVENOUS
Status: DISCONTINUED | OUTPATIENT
Start: 2020-06-05 | End: 2020-06-05 | Stop reason: HOSPADM

## 2020-06-05 RX ADMIN — HEPARIN 500 UNITS: 100 SYRINGE at 11:06

## 2020-06-05 RX ADMIN — Medication 10 ML: at 11:06

## 2020-06-05 NOTE — PLAN OF CARE
Problem: Fatigue (Oncology Care)  Goal: Improved Activity Tolerance  Outcome: Ongoing, Progressing

## 2020-06-15 ENCOUNTER — LAB VISIT (OUTPATIENT)
Dept: LAB | Facility: HOSPITAL | Age: 50
End: 2020-06-15
Attending: INTERNAL MEDICINE
Payer: COMMERCIAL

## 2020-06-15 ENCOUNTER — LAB VISIT (OUTPATIENT)
Dept: FAMILY MEDICINE | Facility: CLINIC | Age: 50
End: 2020-06-15
Payer: COMMERCIAL

## 2020-06-15 ENCOUNTER — OFFICE VISIT (OUTPATIENT)
Dept: HEMATOLOGY/ONCOLOGY | Facility: CLINIC | Age: 50
End: 2020-06-15
Payer: COMMERCIAL

## 2020-06-15 VITALS
RESPIRATION RATE: 16 BRPM | WEIGHT: 259.06 LBS | OXYGEN SATURATION: 99 % | BODY MASS INDEX: 41.63 KG/M2 | HEIGHT: 66 IN | SYSTOLIC BLOOD PRESSURE: 118 MMHG | HEART RATE: 101 BPM | DIASTOLIC BLOOD PRESSURE: 82 MMHG | TEMPERATURE: 98 F

## 2020-06-15 DIAGNOSIS — Z13.9 SCREENING FOR CONDITION: ICD-10-CM

## 2020-06-15 DIAGNOSIS — C18.7 MALIGNANT NEOPLASM OF SIGMOID COLON: Primary | ICD-10-CM

## 2020-06-15 DIAGNOSIS — C18.7 MALIGNANT NEOPLASM OF SIGMOID COLON: ICD-10-CM

## 2020-06-15 LAB
ALBUMIN SERPL BCP-MCNC: 4.2 G/DL (ref 3.5–5.2)
ALP SERPL-CCNC: 140 U/L (ref 38–145)
ALT SERPL W/O P-5'-P-CCNC: 28 U/L (ref 0–35)
ANION GAP SERPL CALC-SCNC: 5 MMOL/L (ref 8–16)
AST SERPL-CCNC: 32 U/L (ref 14–36)
BILIRUB SERPL-MCNC: 0.2 MG/DL (ref 0.2–1.3)
BUN SERPL-MCNC: 13 MG/DL (ref 7–18)
CALCIUM SERPL-MCNC: 11.1 MG/DL (ref 8.4–10.2)
CHLORIDE SERPL-SCNC: 109 MMOL/L (ref 95–110)
CO2 SERPL-SCNC: 26 MMOL/L (ref 22–31)
CREAT SERPL-MCNC: 0.8 MG/DL (ref 0.5–1.4)
ERYTHROCYTE [DISTWIDTH] IN BLOOD BY AUTOMATED COUNT: 17.4 % (ref 11.5–14.5)
EST. GFR  (AFRICAN AMERICAN): >60 ML/MIN/1.73 M^2
EST. GFR  (NON AFRICAN AMERICAN): >60 ML/MIN/1.73 M^2
GLUCOSE SERPL-MCNC: 97 MG/DL (ref 70–110)
HCT VFR BLD AUTO: 37.2 % (ref 37–48.5)
HGB BLD-MCNC: 11.1 G/DL (ref 12–16)
IMM GRANULOCYTES # BLD AUTO: 0.02 K/UL (ref 0–0.04)
MCH RBC QN AUTO: 23.3 PG (ref 27–31)
MCHC RBC AUTO-ENTMCNC: 29.8 G/DL (ref 32–36)
MCV RBC AUTO: 78 FL (ref 82–98)
NEUTROPHILS # BLD AUTO: 2.4 K/UL (ref 1.8–7.7)
PLATELET # BLD AUTO: 302 K/UL (ref 150–350)
PMV BLD AUTO: 9.9 FL (ref 9.2–12.9)
POTASSIUM SERPL-SCNC: 4.4 MMOL/L (ref 3.5–5.1)
PROT SERPL-MCNC: 7.3 G/DL (ref 6–8.4)
RBC # BLD AUTO: 4.76 M/UL (ref 4–5.4)
SODIUM SERPL-SCNC: 140 MMOL/L (ref 136–145)
WBC # BLD AUTO: 5.46 K/UL (ref 3.9–12.7)

## 2020-06-15 PROCEDURE — 3008F BODY MASS INDEX DOCD: CPT | Mod: CPTII,S$GLB,, | Performed by: INTERNAL MEDICINE

## 2020-06-15 PROCEDURE — 85027 COMPLETE CBC AUTOMATED: CPT | Mod: PN

## 2020-06-15 PROCEDURE — 80053 COMPREHEN METABOLIC PANEL: CPT | Mod: PN

## 2020-06-15 PROCEDURE — 3008F PR BODY MASS INDEX (BMI) DOCUMENTED: ICD-10-PCS | Mod: CPTII,S$GLB,, | Performed by: INTERNAL MEDICINE

## 2020-06-15 PROCEDURE — 99214 OFFICE O/P EST MOD 30 MIN: CPT | Mod: S$GLB,,, | Performed by: INTERNAL MEDICINE

## 2020-06-15 PROCEDURE — 36415 COLL VENOUS BLD VENIPUNCTURE: CPT | Mod: PN

## 2020-06-15 PROCEDURE — 99999 PR PBB SHADOW E&M-EST. PATIENT-LVL IV: CPT | Mod: PBBFAC,,, | Performed by: INTERNAL MEDICINE

## 2020-06-15 PROCEDURE — 85027 COMPLETE CBC AUTOMATED: CPT

## 2020-06-15 PROCEDURE — 99214 PR OFFICE/OUTPT VISIT, EST, LEVL IV, 30-39 MIN: ICD-10-PCS | Mod: S$GLB,,, | Performed by: INTERNAL MEDICINE

## 2020-06-15 PROCEDURE — U0003 INFECTIOUS AGENT DETECTION BY NUCLEIC ACID (DNA OR RNA); SEVERE ACUTE RESPIRATORY SYNDROME CORONAVIRUS 2 (SARS-COV-2) (CORONAVIRUS DISEASE [COVID-19]), AMPLIFIED PROBE TECHNIQUE, MAKING USE OF HIGH THROUGHPUT TECHNOLOGIES AS DESCRIBED BY CMS-2020-01-R: HCPCS

## 2020-06-15 PROCEDURE — 3079F DIAST BP 80-89 MM HG: CPT | Mod: CPTII,S$GLB,, | Performed by: INTERNAL MEDICINE

## 2020-06-15 PROCEDURE — 80053 COMPREHEN METABOLIC PANEL: CPT

## 2020-06-15 PROCEDURE — 3074F PR MOST RECENT SYSTOLIC BLOOD PRESSURE < 130 MM HG: ICD-10-PCS | Mod: CPTII,S$GLB,, | Performed by: INTERNAL MEDICINE

## 2020-06-15 PROCEDURE — 99999 PR PBB SHADOW E&M-EST. PATIENT-LVL IV: ICD-10-PCS | Mod: PBBFAC,,, | Performed by: INTERNAL MEDICINE

## 2020-06-15 PROCEDURE — 3074F SYST BP LT 130 MM HG: CPT | Mod: CPTII,S$GLB,, | Performed by: INTERNAL MEDICINE

## 2020-06-15 PROCEDURE — 3079F PR MOST RECENT DIASTOLIC BLOOD PRESSURE 80-89 MM HG: ICD-10-PCS | Mod: CPTII,S$GLB,, | Performed by: INTERNAL MEDICINE

## 2020-06-15 RX ORDER — EPINEPHRINE 0.3 MG/.3ML
0.3 INJECTION SUBCUTANEOUS ONCE AS NEEDED
Status: CANCELLED | OUTPATIENT
Start: 2020-06-17

## 2020-06-15 RX ORDER — SODIUM CHLORIDE 0.9 % (FLUSH) 0.9 %
10 SYRINGE (ML) INJECTION
Status: CANCELLED | OUTPATIENT
Start: 2020-06-19

## 2020-06-15 RX ORDER — DIPHENHYDRAMINE HYDROCHLORIDE 50 MG/ML
50 INJECTION INTRAMUSCULAR; INTRAVENOUS ONCE AS NEEDED
Status: CANCELLED | OUTPATIENT
Start: 2020-06-17

## 2020-06-15 RX ORDER — FLUOROURACIL 50 MG/ML
400 INJECTION, SOLUTION INTRAVENOUS
Status: CANCELLED | OUTPATIENT
Start: 2020-06-17

## 2020-06-15 RX ORDER — HEPARIN 100 UNIT/ML
500 SYRINGE INTRAVENOUS
Status: CANCELLED | OUTPATIENT
Start: 2020-06-19

## 2020-06-15 RX ORDER — SODIUM CHLORIDE 0.9 % (FLUSH) 0.9 %
10 SYRINGE (ML) INJECTION
Status: CANCELLED | OUTPATIENT
Start: 2020-06-17

## 2020-06-15 RX ORDER — SODIUM CHLORIDE 9 MG/ML
INJECTION, SOLUTION INTRAVENOUS ONCE
Status: CANCELLED
Start: 2020-06-19

## 2020-06-15 RX ORDER — HEPARIN 100 UNIT/ML
500 SYRINGE INTRAVENOUS
Status: CANCELLED | OUTPATIENT
Start: 2020-06-17

## 2020-06-15 NOTE — PROGRESS NOTES
Subjective:       Patient ID: Gail Mckinnon is a 50 y.o. female.    Chief Complaint: Colon cancer      History:  Past Medical History:   Diagnosis Date    Anxiety     Depression     FH: ovarian cancer 3/16/2020    Hyperthyroidism     Hypothyroid     Kidney calculi     Malignant neoplasm of sigmoid colon 3/16/2020    Menorrhagia     Multinodular goiter 2012    Palpitation     Venous insufficiency      Past Surgical History:   Procedure Laterality Date     SECTION, CLASSIC      x3    COLONOSCOPY N/A 2020    Procedure: COLONOSCOPY;  Surgeon: Shane Parker MD;  Location: [facility];  Service: Endoscopy;  Laterality: N/A;    CYSTOSCOPY W/ URETERAL STENT PLACEMENT Bilateral 3/25/2020    Procedure: CYSTOSCOPY, WITH URETERAL STENT INSERTION;  Surgeon: Claudio Tyson MD;  Location: [facility] OR [address] [place] [address];  Service: Urology;  Laterality: Bilateral;    INSERTION OF TUNNELED CENTRAL VENOUS CATHETER (CVC) WITH SUBCUTANEOUS PORT N/A 2020    Procedure: [id number]-[id number]-W-CATH;  Surgeon: [facility] Diagnostic Provider;  Location: [facility] OR [address] [place] [address];  Service: Radiology;  Laterality: N/A;  Room 189/[facility]    LAPAROSCOPIC SIGMOIDECTOMY N/A 3/25/2020    Procedure: COLECTOMY, SIGMOID, LAPAROSCOPIC, flex sig, ERAS high;  Surgeon: Silvio Man MD;  [facility];  Service: Colon and Rectal;  Laterality: N/A;    MOBILIZATION OF SPLENIC FLEXURE  3/25/2020    Procedure: MOBILIZATION, SPLENIC FLEXURE;  Surgeon: Silvio Man MD;  [facility];  Service: Colon and Rectal;;    tonsillectomy      TOTAL ABDOMINAL HYSTERECTOMY W/ BILATERAL SALPINGOOPHORECTOMY N/A 3/25/2020    Procedure: HYSTERECTOMY, TOTAL, ABDOMINAL, WITH BILATERAL SALPINGO-OOPHORECTOMY;  Surgeon: Keron Brady MD;  [facility];  Service: Oncology;  Laterality: N/A;      Oncology History   Malignant neoplasm of sigmoid colon   3/16/2020 Initial Diagnosis    Malignant neoplasm of  sigmoid colon     3/31/2020 Cancer Staged    Staging form: Colon and Rectum, AJCC 8th Edition  - Clinical stage from 3/31/2020: Stage IIIC (cT4b, cN2a, cM0)     5/6/2020 -  Chemotherapy    Treatment Summary   Plan Name: OP FOLFOX 6 Q2W  Treatment Goal: Curative  Status: Active  Start Date: 5/6/2020  End Date: 10/9/2020 (Planned)  Provider: MAXWELL Dove MD  Chemotherapy: fluorouraciL injection 945 mg, 400 mg/m2 = 945 mg, Intravenous, Clinic/HOD 1 time, 3 of 12 cycles  Administration: 945 mg (5/6/2020), 945 mg (5/20/2020), 945 mg (6/3/2020)  fluorouraciL 2,400 mg/m2 = 5,665 mg in sodium chloride 0.9% 240 mL chemo infusion, 2,400 mg/m2 = 5,665 mg, Intravenous, Over 46 hours, 3 of 12 cycles  Administration: 5,665 mg (5/6/2020), 5,665 mg (5/20/2020), 5,665 mg (6/3/2020)  leucovorin calcium 900 mg in dextrose 5 % 250 mL infusion, 945 mg, Intravenous, Clinic/HOD 1 time, 3 of 12 cycles  Administration: 900 mg (5/6/2020), 900 mg (5/20/2020), 900 mg (6/3/2020)  oxaliplatin (ELOXATIN) 200 mg in dextrose 5 % 500 mL chemo infusion, 201 mg, Intravenous, Clinic/HOD 1 time, 3 of 12 cycles  Administration: 200 mg (5/6/2020), 200 mg (5/20/2020), 200 mg (6/3/2020)     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   4/3/2020 Initial Diagnosis    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     5/6/2020 -  Chemotherapy    Treatment Summary   Plan Name: OP FOLFOX 6 Q2W  Treatment Goal: Curative  Status: Active  Start Date: 5/6/2020  End Date: 10/9/2020 (Planned)  Provider: MAXWELL Dove MD  Chemotherapy: fluorouraciL injection 945 mg, 400 mg/m2 = 945 mg, Intravenous, Clinic/HOD 1 time, 3 of 12 cycles  Administration: 945 mg (5/6/2020), 945 mg (5/20/2020), 945 mg (6/3/2020)  fluorouraciL 2,400 mg/m2 = 5,665 mg in sodium chloride 0.9% 240 mL chemo infusion, 2,400 mg/m2 = 5,665 mg, Intravenous, Over 46 hours, 3 of 12 cycles  Administration: 5,665 mg (5/6/2020), 5,665 mg (5/20/2020), 5,665 mg (6/3/2020)  leucovorin calcium 900 mg in dextrose 5  % 250 mL infusion, 945 mg, Intravenous, Clinic/HOD 1 time, 3 of 12 cycles  Administration: 900 mg (5/6/2020), 900 mg (5/20/2020), 900 mg (6/3/2020)  oxaliplatin (ELOXATIN) 200 mg in dextrose 5 % 500 mL chemo infusion, 201 mg, Intravenous, Clinic/HOD 1 time, 3 of 12 cycles  Administration: 200 mg (5/6/2020), 200 mg (5/20/2020), 200 mg (6/3/2020)          Allergies:  Review of patient's allergies indicates:   Allergen Reactions    Penicillin g      unknown    Penicillins Hives     childhood allergy        HPI 50-year-old female who began having abdominal discomfort in June of 2019.  She was diagnosed with kidney stones and a CT scan showed some thickening in her colon.  She was then sent to see her PCP who recommended a colonoscopy.  The patient put this off until around January of 2020 when her PCP was adamant that she undergo further workup and she underwent colonoscopy in February of 2020.  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  The scans did not show obvious evidence of metastatic disease.  There was a small lesion in the liver that was concerning for a cyst in this will need to be followed closely.  She has healed from her surgery and she did have 37 lymph nodes removed of which 4 positive for cancer.     She completed cycle 3.  Overall she feels well today.  Her daughter did test positive for COVID recently but otherwise she has no complaints.  She denies fevers, chills or night sweats.  She denies any abdominal pain or early satiety.  She denies melena, hematochezia red blood per rectum.  She denies any body aches, chest pain, shortness of breath or productive cough.  Her daughter appears to be relatively asymptomatic from this aside from some occasional body aches.      ECOG:  ECOG SCORE            Review  of Systems   Constitutional: Negative for appetite change, chills, fatigue, fever and unexpected weight change.   HENT: Negative for congestion, facial swelling, mouth sores, sinus pressure, sinus pain, sore throat and trouble swallowing.    Eyes: Negative for photophobia, pain and visual disturbance.   Respiratory: Negative for cough, chest tightness, shortness of breath, wheezing and stridor.    Cardiovascular: Negative for chest pain and leg swelling.   Gastrointestinal: Negative for abdominal distention, abdominal pain, blood in stool, constipation, diarrhea, nausea, rectal pain and vomiting.   Genitourinary: Negative for dysuria, flank pain and urgency.   Musculoskeletal: Negative for back pain, gait problem, joint swelling and neck pain.   Skin: Negative for color change and rash.   Neurological: Negative for dizziness, syncope, weakness, light-headedness, numbness and headaches.   Hematological: Negative for adenopathy. Does not bruise/bleed easily.   Psychiatric/Behavioral: Negative for agitation, behavioral problems and confusion. The patient is not nervous/anxious.        Objective:      Physical Exam  Vitals signs reviewed.   Constitutional:       General: She is not in acute distress.     Appearance: She is well-developed. She is not diaphoretic.   HENT:      Head: Normocephalic.      Mouth/Throat:      Mouth: Mucous membranes are moist.   Eyes:      General: No scleral icterus.     Extraocular Movements: Extraocular movements intact.      Pupils: Pupils are equal, round, and reactive to light.   Neck:      Musculoskeletal: Normal range of motion and neck supple. No neck rigidity.      Trachea: No tracheal deviation.   Cardiovascular:      Rate and Rhythm: Normal rate.   Pulmonary:      Effort: Pulmonary effort is normal. No respiratory distress.      Breath sounds: Normal breath sounds. No stridor. No wheezing.   Abdominal:      General: Bowel sounds are normal. There is no distension.      Palpations:  Abdomen is soft. There is no mass.      Tenderness: There is no abdominal tenderness.   Musculoskeletal:         General: No deformity.   Skin:     Findings: No rash.   Neurological:      General: No focal deficit present.      Mental Status: She is alert and oriented to person, place, and time.      Cranial Nerves: No cranial nerve deficit.      Sensory: No sensory deficit.   Psychiatric:         Behavior: Behavior normal.         Thought Content: Thought content normal.         Judgment: Judgment normal.         Vitals:    06/15/20 1117   BP: 118/82   Pulse: 101   Resp: 16   Temp: 97.6 °F (36.4 °C)       Results:     Lab Visit on 06/15/2020   Component Date Value Ref Range Status    WBC 06/15/2020 5.46  3.90 - 12.70 K/uL Final    RBC 06/15/2020 4.76  4.00 - 5.40 M/uL Final    Hemoglobin 06/15/2020 11.1* 12.0 - 16.0 g/dL Final    Hematocrit 06/15/2020 37.2  37.0 - 48.5 % Final    Mean Corpuscular Volume 06/15/2020 78* 82 - 98 fL Final    Mean Corpuscular Hemoglobin 06/15/2020 23.3* 27.0 - 31.0 pg Final    Mean Corpuscular Hemoglobin Conc 06/15/2020 29.8* 32.0 - 36.0 g/dL Final    RDW 06/15/2020 17.4* 11.5 - 14.5 % Final    Platelets 06/15/2020 302  150 - 350 K/uL Final    MPV 06/15/2020 9.9  9.2 - 12.9 fL Final    Gran # (ANC) 06/15/2020 2.4  1.8 - 7.7 K/uL Final    Comment: The ANC is based on a white cell differential from an   automated cell counter. It has not been microscopically   reviewed for the presence of abnormal cells. Clinical   correlation is required.      Immature Grans (Abs) 06/15/2020 0.02  0.00 - 0.04 K/uL Final    Comment: Mild elevation in immature granulocytes is non specific and   can be seen in a variety of conditions including stress response,   acute inflammation, trauma and pregnancy. Correlation with other   laboratory and clinical findings is essential.      Sodium 06/15/2020 140  136 - 145 mmol/L Final    Potassium 06/15/2020 4.4  3.5 - 5.1 mmol/L Final    Chloride  06/15/2020 109  95 - 110 mmol/L Final    CO2 06/15/2020 26  22 - 31 mmol/L Final    Glucose 06/15/2020 97  70 - 110 mg/dL Final    Comment: The ADA recommends the following guidelines for fasting glucose:  Normal:       less than 100 mg/dL  Prediabetes:  100 mg/dL to 125 mg/dL  Diabetes:     126 mg/dL or higher      BUN, Bld 06/15/2020 13  7 - 18 mg/dL Final    Creatinine 06/15/2020 0.80  0.50 - 1.40 mg/dL Final    Calcium 06/15/2020 11.1* 8.4 - 10.2 mg/dL Final    Total Protein 06/15/2020 7.3  6.0 - 8.4 g/dL Final    Albumin 06/15/2020 4.2  3.5 - 5.2 g/dL Final    Total Bilirubin 06/15/2020 0.2  0.2 - 1.3 mg/dL Final    Alkaline Phosphatase 06/15/2020 140  38 - 145 U/L Final    AST 06/15/2020 32  14 - 36 U/L Final    ALT 06/15/2020 28  0 - 35 U/L Final    Anion Gap 06/15/2020 5* 8 - 16 mmol/L Final    eGFR if African American 06/15/2020 >60  >60 mL/min/1.73 m^2 Final    eGFR if non African American 06/15/2020 >60  >60 mL/min/1.73 m^2 Final    Comment: Calculation used to obtain the estimated glomerular filtration  rate (eGFR) is the CKD-EPI equation.           Assessment:     1. Malignant neoplasm of sigmoid colon    2. 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 Psychophysiological insomnia    4. Anxiety    5. Other constipation    6. Nausea      Plan:   Plan: COVID-19 test pending    1,2,6.  Stage III colon cancer:  She will proceed with cycle 4 of FOLFOX Wednesday. We will continue with adding Emend to her regimen in addition to the Aloxi.  She should continue her home Zofran.     3,4.  Anxiety and insomnia:   She has been on BuSpar the past.  She has also met with our psychologist.  Overall she is tolerating this well and we will continue to monitor closely.     5.  Constipation: She will continue to take a stool softener daily and continue the MiraLax as needed.  She is aware to contact us with any problems or worsening symptoms will certainly see her sooner.  She believes that her bowel movements  are more regular at this point.    Pt displayed understanding of the above encounter and treatment plan. All thoughtful questions were answered to their satisfaction. Pte was advised to notify the care team or proceed to the ER if signs and symptoms worsen.

## 2020-06-17 ENCOUNTER — INFUSION (OUTPATIENT)
Dept: INFUSION THERAPY | Facility: HOSPITAL | Age: 50
End: 2020-06-17
Attending: INTERNAL MEDICINE
Payer: COMMERCIAL

## 2020-06-17 VITALS
DIASTOLIC BLOOD PRESSURE: 75 MMHG | SYSTOLIC BLOOD PRESSURE: 125 MMHG | WEIGHT: 260.56 LBS | HEART RATE: 97 BPM | BODY MASS INDEX: 41.88 KG/M2 | HEIGHT: 66 IN | TEMPERATURE: 99 F | RESPIRATION RATE: 16 BRPM

## 2020-06-17 DIAGNOSIS — C77.2 METASTASIS TO INTESTINAL LYMPH NODE: Primary | ICD-10-CM

## 2020-06-17 DIAGNOSIS — C18.7 MALIGNANT NEOPLASM OF SIGMOID COLON: ICD-10-CM

## 2020-06-17 LAB — SARS-COV-2 RNA RESP QL NAA+PROBE: NOT DETECTED

## 2020-06-17 PROCEDURE — 96368 THER/DIAG CONCURRENT INF: CPT | Mod: PN

## 2020-06-17 PROCEDURE — 96415 CHEMO IV INFUSION ADDL HR: CPT | Mod: PN

## 2020-06-17 PROCEDURE — 96413 CHEMO IV INFUSION 1 HR: CPT | Mod: PN

## 2020-06-17 PROCEDURE — 96375 TX/PRO/DX INJ NEW DRUG ADDON: CPT | Mod: PN

## 2020-06-17 PROCEDURE — 96367 TX/PROPH/DG ADDL SEQ IV INF: CPT | Mod: PN

## 2020-06-17 PROCEDURE — 96411 CHEMO IV PUSH ADDL DRUG: CPT | Mod: PN

## 2020-06-17 PROCEDURE — 63600175 PHARM REV CODE 636 W HCPCS: Mod: TB,PN | Performed by: INTERNAL MEDICINE

## 2020-06-17 PROCEDURE — 96416 CHEMO PROLONG INFUSE W/PUMP: CPT | Mod: PN

## 2020-06-17 PROCEDURE — 25000003 PHARM REV CODE 250: Mod: PN | Performed by: INTERNAL MEDICINE

## 2020-06-17 RX ORDER — FLUOROURACIL 50 MG/ML
400 INJECTION, SOLUTION INTRAVENOUS
Status: COMPLETED | OUTPATIENT
Start: 2020-06-17 | End: 2020-06-17

## 2020-06-17 RX ORDER — EPINEPHRINE 0.3 MG/.3ML
0.3 INJECTION SUBCUTANEOUS ONCE AS NEEDED
Status: DISCONTINUED | OUTPATIENT
Start: 2020-06-17 | End: 2020-06-17 | Stop reason: HOSPADM

## 2020-06-17 RX ORDER — DIPHENHYDRAMINE HYDROCHLORIDE 50 MG/ML
50 INJECTION INTRAMUSCULAR; INTRAVENOUS ONCE AS NEEDED
Status: DISCONTINUED | OUTPATIENT
Start: 2020-06-17 | End: 2020-06-17 | Stop reason: HOSPADM

## 2020-06-17 RX ORDER — SODIUM CHLORIDE 0.9 % (FLUSH) 0.9 %
10 SYRINGE (ML) INJECTION
Status: DISCONTINUED | OUTPATIENT
Start: 2020-06-17 | End: 2020-06-17 | Stop reason: HOSPADM

## 2020-06-17 RX ADMIN — DEXAMETHASONE SODIUM PHOSPHATE: 4 INJECTION, SOLUTION INTRA-ARTICULAR; INTRALESIONAL; INTRAMUSCULAR; INTRAVENOUS; SOFT TISSUE at 09:06

## 2020-06-17 RX ADMIN — APREPITANT 130 MG: 130 INJECTION, EMULSION INTRAVENOUS at 09:06

## 2020-06-17 RX ADMIN — OXALIPLATIN 200 MG: 100 INJECTION, SOLUTION, CONCENTRATE INTRAVENOUS at 10:06

## 2020-06-17 RX ADMIN — FLUOROURACIL 945 MG: 50 INJECTION, SOLUTION INTRAVENOUS at 12:06

## 2020-06-17 RX ADMIN — LEUCOVORIN CALCIUM 900 MG: 350 INJECTION, POWDER, LYOPHILIZED, FOR SOLUTION INTRAMUSCULAR; INTRAVENOUS at 10:06

## 2020-06-17 RX ADMIN — FLUOROURACIL 5665 MG: 50 INJECTION, SOLUTION INTRAVENOUS at 01:06

## 2020-06-17 NOTE — PLAN OF CARE
Problem: Adult Inpatient Plan of Care  Goal: Patient-Specific Goal (Individualization)  Flowsheets (Taken 6/17/2020 1006)  Individualized Care Needs: warm blanket, recliner  Anxieties, Fears or Concerns: side effects, especially constipation  Patient-Specific Goals (Include Timeframe): infection prevention during treatment     Problem: Fatigue (Oncology Care)  Goal: Improved Activity Tolerance  Outcome: Ongoing, Progressing  Intervention: Promote Energy Conservation  Flowsheets (Taken 6/17/2020 1006)  Fatigue Management: frequent rest breaks encouraged  Sleep/Rest Enhancement:   consistent schedule promoted   regular sleep/rest pattern promoted  Activity Management:   ambulated in reyes - L4   up in chair - L3

## 2020-06-17 NOTE — PLAN OF CARE
Problem: Adult Inpatient Plan of Care  Goal: Plan of Care Review  Outcome: Ongoing, Progressing  Flowsheets (Taken 6/17/2020 1325)  Plan of Care Reviewed With: patient  Pt tolerated FOLFOX well. No adverse reaction noted. Pt education reinforced on chemo regimen, side effects, what to expect, and when to call physician. Pt verbalized understanding. I reviewed pt calendar w/ pt and understanding verbalized. PAC remains accessed with 5FU infusing at 5.2cc/hr over 46 hours. Patent upon discharge.

## 2020-06-19 ENCOUNTER — INFUSION (OUTPATIENT)
Dept: INFUSION THERAPY | Facility: HOSPITAL | Age: 50
End: 2020-06-19
Attending: INTERNAL MEDICINE
Payer: COMMERCIAL

## 2020-06-19 ENCOUNTER — PATIENT OUTREACH (OUTPATIENT)
Dept: ADMINISTRATIVE | Facility: HOSPITAL | Age: 50
End: 2020-06-19

## 2020-06-19 ENCOUNTER — DOCUMENTATION ONLY (OUTPATIENT)
Dept: INFUSION THERAPY | Facility: HOSPITAL | Age: 50
End: 2020-06-19

## 2020-06-19 VITALS
BODY MASS INDEX: 41.88 KG/M2 | SYSTOLIC BLOOD PRESSURE: 114 MMHG | WEIGHT: 260.56 LBS | TEMPERATURE: 98 F | HEART RATE: 77 BPM | DIASTOLIC BLOOD PRESSURE: 66 MMHG | HEIGHT: 66 IN | RESPIRATION RATE: 16 BRPM

## 2020-06-19 DIAGNOSIS — C77.2 METASTASIS TO INTESTINAL LYMPH NODE: Primary | ICD-10-CM

## 2020-06-19 DIAGNOSIS — C18.7 MALIGNANT NEOPLASM OF SIGMOID COLON: ICD-10-CM

## 2020-06-19 PROCEDURE — A4216 STERILE WATER/SALINE, 10 ML: HCPCS | Mod: PN | Performed by: INTERNAL MEDICINE

## 2020-06-19 PROCEDURE — 63600175 PHARM REV CODE 636 W HCPCS: Mod: PN | Performed by: INTERNAL MEDICINE

## 2020-06-19 PROCEDURE — 96361 HYDRATE IV INFUSION ADD-ON: CPT | Mod: PN

## 2020-06-19 PROCEDURE — 96360 HYDRATION IV INFUSION INIT: CPT | Mod: PN

## 2020-06-19 PROCEDURE — 25000003 PHARM REV CODE 250: Mod: PN | Performed by: INTERNAL MEDICINE

## 2020-06-19 RX ORDER — HEPARIN 100 UNIT/ML
500 SYRINGE INTRAVENOUS
Status: DISCONTINUED | OUTPATIENT
Start: 2020-06-19 | End: 2020-06-19 | Stop reason: HOSPADM

## 2020-06-19 RX ORDER — SODIUM CHLORIDE 9 MG/ML
INJECTION, SOLUTION INTRAVENOUS ONCE
Status: COMPLETED | OUTPATIENT
Start: 2020-06-19 | End: 2020-06-19

## 2020-06-19 RX ORDER — SODIUM CHLORIDE 0.9 % (FLUSH) 0.9 %
10 SYRINGE (ML) INJECTION
Status: DISCONTINUED | OUTPATIENT
Start: 2020-06-19 | End: 2020-06-19 | Stop reason: HOSPADM

## 2020-06-19 RX ADMIN — SODIUM CHLORIDE: 9 INJECTION, SOLUTION INTRAVENOUS at 12:06

## 2020-06-19 RX ADMIN — Medication 10 ML: at 12:06

## 2020-06-19 RX ADMIN — HEPARIN 500 UNITS: 100 SYRINGE at 02:06

## 2020-06-19 NOTE — PROGRESS NOTES
?[1:56 PM] Reta Nowak      i have welsh [id number] here for her d/c pump day and she came in crying says she is usually emotional  on the d/c pump days and was suppose to be taking 2 tablets of  her wellbutrin and she is only taking one  is there anything we need to give her while she is here thanks  ?  ?[1:59 PM] Hien Ding      do you have a  or psychologist there who can help her?  ?[1:59 PM] Reta Nowak      we have a  we can offer her and the nurse encouraged her to increase the well angie like she was told  ?2:03 PM] Hien Ding      please tell her to stop the trazadone and follow up with dr jean  ?[2:03 PM] Hien Ding      per agustín hamilton  ?[2:03 PM] Reta Nowak      ok

## 2020-06-19 NOTE — PROGRESS NOTES
Non-compliant report chart audits. Chart review completed for HM test overdue (mammograms, Colonoscopies, pap smears, DM labs, and/or EYE EXAMs)  06/19/2020    Care Everywhere and media, updates requested and reviewed.            DIS reviewed for test needed.  Mammogram

## 2020-06-22 ENCOUNTER — TELEPHONE (OUTPATIENT)
Dept: INFUSION THERAPY | Facility: HOSPITAL | Age: 50
End: 2020-06-22

## 2020-06-26 ENCOUNTER — DOCUMENTATION ONLY (OUTPATIENT)
Dept: INFUSION THERAPY | Facility: HOSPITAL | Age: 50
End: 2020-06-26

## 2020-06-26 ENCOUNTER — PATIENT MESSAGE (OUTPATIENT)
Dept: HEMATOLOGY/ONCOLOGY | Facility: CLINIC | Age: 50
End: 2020-06-26

## 2020-06-26 NOTE — PROGRESS NOTES
Received call from [facility] that pt req to r/s Cycle 6 appts to wed/friday as this is pt's preference, unable to change next weeks schedule due to we are closed Friday 7/3.  They will inform pt of change as requested.

## 2020-06-29 ENCOUNTER — OFFICE VISIT (OUTPATIENT)
Dept: HEMATOLOGY/ONCOLOGY | Facility: CLINIC | Age: 50
End: 2020-06-29
Payer: COMMERCIAL

## 2020-06-29 ENCOUNTER — OFFICE VISIT (OUTPATIENT)
Dept: PSYCHIATRY | Facility: CLINIC | Age: 50
End: 2020-06-29
Payer: COMMERCIAL

## 2020-06-29 ENCOUNTER — LAB VISIT (OUTPATIENT)
Dept: LAB | Facility: HOSPITAL | Age: 50
End: 2020-06-29
Attending: INTERNAL MEDICINE
Payer: COMMERCIAL

## 2020-06-29 VITALS
SYSTOLIC BLOOD PRESSURE: 163 MMHG | WEIGHT: 262.56 LBS | BODY MASS INDEX: 42.2 KG/M2 | HEART RATE: 70 BPM | HEIGHT: 66 IN | RESPIRATION RATE: 13 BRPM | TEMPERATURE: 98 F | OXYGEN SATURATION: 99 % | DIASTOLIC BLOOD PRESSURE: 80 MMHG

## 2020-06-29 DIAGNOSIS — C18.7 MALIGNANT NEOPLASM OF SIGMOID COLON: ICD-10-CM

## 2020-06-29 DIAGNOSIS — C18.7 MALIGNANT NEOPLASM OF SIGMOID COLON: Primary | ICD-10-CM

## 2020-06-29 DIAGNOSIS — F51.04 PSYCHOPHYSIOLOGICAL INSOMNIA: ICD-10-CM

## 2020-06-29 DIAGNOSIS — F34.1 DYSTHYMIA: ICD-10-CM

## 2020-06-29 DIAGNOSIS — F41.1 GENERALIZED ANXIETY DISORDER WITH PANIC ATTACKS: Primary | ICD-10-CM

## 2020-06-29 DIAGNOSIS — F41.0 GENERALIZED ANXIETY DISORDER WITH PANIC ATTACKS: Primary | ICD-10-CM

## 2020-06-29 LAB
ALBUMIN SERPL BCP-MCNC: 4 G/DL (ref 3.5–5.2)
ALP SERPL-CCNC: 142 U/L (ref 38–145)
ALT SERPL W/O P-5'-P-CCNC: 38 U/L (ref 0–35)
ANION GAP SERPL CALC-SCNC: 5 MMOL/L (ref 8–16)
AST SERPL-CCNC: 46 U/L (ref 14–36)
BASOPHILS # BLD AUTO: 0.04 K/UL (ref 0–0.2)
BASOPHILS NFR BLD: 0.8 % (ref 0–1.9)
BILIRUB SERPL-MCNC: 0.2 MG/DL (ref 0.2–1.3)
BUN SERPL-MCNC: 15 MG/DL (ref 7–18)
CALCIUM SERPL-MCNC: 10.7 MG/DL (ref 8.4–10.2)
CEA SERPL-MCNC: 1.2 NG/ML (ref 0–5)
CHLORIDE SERPL-SCNC: 108 MMOL/L (ref 95–110)
CO2 SERPL-SCNC: 25 MMOL/L (ref 22–31)
CREAT SERPL-MCNC: 0.72 MG/DL (ref 0.5–1.4)
DIFFERENTIAL METHOD: ABNORMAL
EOSINOPHIL # BLD AUTO: 0.4 K/UL (ref 0–0.5)
EOSINOPHIL NFR BLD: 8.7 % (ref 0–8)
ERYTHROCYTE [DISTWIDTH] IN BLOOD BY AUTOMATED COUNT: 18.8 % (ref 11.5–14.5)
EST. GFR  (AFRICAN AMERICAN): >60 ML/MIN/1.73 M^2
EST. GFR  (NON AFRICAN AMERICAN): >60 ML/MIN/1.73 M^2
GLUCOSE SERPL-MCNC: 102 MG/DL (ref 70–110)
HCT VFR BLD AUTO: 36.3 % (ref 37–48.5)
HGB BLD-MCNC: 10.8 G/DL (ref 12–16)
IMM GRANULOCYTES # BLD AUTO: 0.01 K/UL (ref 0–0.04)
IMM GRANULOCYTES NFR BLD AUTO: 0.2 % (ref 0–0.5)
LYMPHOCYTES # BLD AUTO: 1.5 K/UL (ref 1–4.8)
LYMPHOCYTES NFR BLD: 31.1 % (ref 18–48)
MCH RBC QN AUTO: 23.3 PG (ref 27–31)
MCHC RBC AUTO-ENTMCNC: 29.8 G/DL (ref 32–36)
MCV RBC AUTO: 78 FL (ref 82–98)
MONOCYTES # BLD AUTO: 0.8 K/UL (ref 0.3–1)
MONOCYTES NFR BLD: 15.7 % (ref 4–15)
NEUTROPHILS # BLD AUTO: 2.1 K/UL (ref 1.8–7.7)
NEUTROPHILS NFR BLD: 43.5 % (ref 38–73)
NRBC BLD-RTO: 0 /100 WBC
PLATELET # BLD AUTO: 241 K/UL (ref 150–350)
PMV BLD AUTO: 9.6 FL (ref 9.2–12.9)
POTASSIUM SERPL-SCNC: 3.9 MMOL/L (ref 3.5–5.1)
PROT SERPL-MCNC: 7.1 G/DL (ref 6–8.4)
RBC # BLD AUTO: 4.64 M/UL (ref 4–5.4)
SODIUM SERPL-SCNC: 138 MMOL/L (ref 136–145)
WBC # BLD AUTO: 4.85 K/UL (ref 3.9–12.7)

## 2020-06-29 PROCEDURE — 85025 COMPLETE CBC W/AUTO DIFF WBC: CPT

## 2020-06-29 PROCEDURE — 80053 COMPREHEN METABOLIC PANEL: CPT

## 2020-06-29 PROCEDURE — 82378 CARCINOEMBRYONIC ANTIGEN: CPT

## 2020-06-29 PROCEDURE — 99999 PR PBB SHADOW E&M-EST. PATIENT-LVL IV: CPT | Mod: PBBFAC,,, | Performed by: INTERNAL MEDICINE

## 2020-06-29 PROCEDURE — 90791 PR PSYCHIATRIC DIAGNOSTIC EVALUATION: ICD-10-PCS | Mod: 95,,, | Performed by: PSYCHOLOGIST

## 2020-06-29 PROCEDURE — 3079F PR MOST RECENT DIASTOLIC BLOOD PRESSURE 80-89 MM HG: ICD-10-PCS | Mod: CPTII,S$GLB,, | Performed by: INTERNAL MEDICINE

## 2020-06-29 PROCEDURE — 3077F PR MOST RECENT SYSTOLIC BLOOD PRESSURE >= 140 MM HG: ICD-10-PCS | Mod: CPTII,S$GLB,, | Performed by: INTERNAL MEDICINE

## 2020-06-29 PROCEDURE — 3079F DIAST BP 80-89 MM HG: CPT | Mod: CPTII,S$GLB,, | Performed by: INTERNAL MEDICINE

## 2020-06-29 PROCEDURE — 3008F PR BODY MASS INDEX (BMI) DOCUMENTED: ICD-10-PCS | Mod: CPTII,S$GLB,, | Performed by: INTERNAL MEDICINE

## 2020-06-29 PROCEDURE — 99214 PR OFFICE/OUTPT VISIT, EST, LEVL IV, 30-39 MIN: ICD-10-PCS | Mod: S$GLB,,, | Performed by: INTERNAL MEDICINE

## 2020-06-29 PROCEDURE — 36415 COLL VENOUS BLD VENIPUNCTURE: CPT | Mod: PN

## 2020-06-29 PROCEDURE — 3008F BODY MASS INDEX DOCD: CPT | Mod: CPTII,S$GLB,, | Performed by: INTERNAL MEDICINE

## 2020-06-29 PROCEDURE — 99999 PR PBB SHADOW E&M-EST. PATIENT-LVL IV: ICD-10-PCS | Mod: PBBFAC,,, | Performed by: INTERNAL MEDICINE

## 2020-06-29 PROCEDURE — 80053 COMPREHEN METABOLIC PANEL: CPT | Mod: PN

## 2020-06-29 PROCEDURE — 90791 PSYCH DIAGNOSTIC EVALUATION: CPT | Mod: 95,,, | Performed by: PSYCHOLOGIST

## 2020-06-29 PROCEDURE — 85025 COMPLETE CBC W/AUTO DIFF WBC: CPT | Mod: PN

## 2020-06-29 PROCEDURE — 99214 OFFICE O/P EST MOD 30 MIN: CPT | Mod: S$GLB,,, | Performed by: INTERNAL MEDICINE

## 2020-06-29 PROCEDURE — 3077F SYST BP >= 140 MM HG: CPT | Mod: CPTII,S$GLB,, | Performed by: INTERNAL MEDICINE

## 2020-06-29 PROCEDURE — 82378 CARCINOEMBRYONIC ANTIGEN: CPT | Mod: PN

## 2020-06-29 RX ORDER — FLUOROURACIL 50 MG/ML
400 INJECTION, SOLUTION INTRAVENOUS
Status: CANCELLED | OUTPATIENT
Start: 2020-06-30

## 2020-06-29 RX ORDER — SODIUM CHLORIDE 0.9 % (FLUSH) 0.9 %
10 SYRINGE (ML) INJECTION
Status: CANCELLED | OUTPATIENT
Start: 2020-07-02

## 2020-06-29 RX ORDER — DIPHENHYDRAMINE HYDROCHLORIDE 50 MG/ML
50 INJECTION INTRAMUSCULAR; INTRAVENOUS ONCE AS NEEDED
Status: CANCELLED | OUTPATIENT
Start: 2020-06-30

## 2020-06-29 RX ORDER — HEPARIN 100 UNIT/ML
500 SYRINGE INTRAVENOUS
Status: CANCELLED | OUTPATIENT
Start: 2020-06-30

## 2020-06-29 RX ORDER — SODIUM CHLORIDE 0.9 % (FLUSH) 0.9 %
10 SYRINGE (ML) INJECTION
Status: CANCELLED | OUTPATIENT
Start: 2020-06-30

## 2020-06-29 RX ORDER — HEPARIN 100 UNIT/ML
500 SYRINGE INTRAVENOUS
Status: CANCELLED | OUTPATIENT
Start: 2020-07-02

## 2020-06-29 RX ORDER — EPINEPHRINE 0.3 MG/.3ML
0.3 INJECTION SUBCUTANEOUS ONCE AS NEEDED
Status: CANCELLED | OUTPATIENT
Start: 2020-06-30

## 2020-06-29 NOTE — LETTER
June 29, 2020        MAXWELL Dove MD  [address] [facility] [address]             [facility]  [address] [facility] [address]  Phone: [phone]  Fax: [phone]   Patient: Gail Mckinnon   MR Number: [id number]   YOB: 1970   Date of Visit: 6/29/2020       Dear Dr. Dove:    Thank you for referring Gail Mckinnon to me for evaluation. Below are the relevant portions of my assessment and plan of care.     Gail Mckinnon is a 50 y.o. female referred by Dr. Dove for psychological evaluation and treatment.  Ms. Mckinnon appears to be very overwhelmed in coping with her diagnosis and treatment course, largely due to serious pre-existing psychosocial stressors and significant pre-existing anxiety.  She is interested in CBT to address depression/anxiety/insomnia and will follow up with me for that purpose. Mood protective strategies during cancer treatment were discussed.  Patient is struggling to remember her night dose of Wellbutrin and might benefit from consideration of transition to extended release formulation (Wellbutrin XL).        If you have questions, please do not hesitate to call me. I look forward to following Gail along with you.    Sincerely,      Lee Gibbs, PhD           CC  Tima Mederos MD

## 2020-06-29 NOTE — PROGRESS NOTES
Subjective:       Patient ID: Gail Mckinnon is a 50 y.o. female.    Chief Complaint: Colon cancer      History:  Past Medical History:   Diagnosis Date    Anxiety     Depression     FH: ovarian cancer 3/16/2020    Hyperthyroidism     Hypothyroid     Kidney calculi     Malignant neoplasm of sigmoid colon 3/16/2020    Menorrhagia     Multinodular goiter 2012    Palpitation     Venous insufficiency      Past Surgical History:   Procedure Laterality Date     SECTION, CLASSIC      x3    COLONOSCOPY N/A 2020    Procedure: COLONOSCOPY;  Surgeon: Shane Parker MD;  Location: [facility];  Service: Endoscopy;  Laterality: N/A;    CYSTOSCOPY W/ URETERAL STENT PLACEMENT Bilateral 3/25/2020    Procedure: CYSTOSCOPY, WITH URETERAL STENT INSERTION;  Surgeon: Claudio Tyson MD;  Location: [facility] OR [address] [place] [address];  Service: Urology;  Laterality: Bilateral;    INSERTION OF TUNNELED CENTRAL VENOUS CATHETER (CVC) WITH SUBCUTANEOUS PORT N/A 2020    Procedure: [id number];  Surgeon: [facility] Diagnostic Provider;  Location: [facility] OR [address] [place] [address];  Service: Radiology;  Laterality: N/A;  Room 189/[facility]    LAPAROSCOPIC SIGMOIDECTOMY N/A 3/25/2020    Procedure: COLECTOMY, SIGMOID, LAPAROSCOPIC, flex sig, ERAS high;  Surgeon: Silvio Man MD;  [facility];  Service: Colon and Rectal;  Laterality: N/A;    MOBILIZATION OF SPLENIC FLEXURE  3/25/2020    Procedure: MOBILIZATION, SPLENIC FLEXURE;  Surgeon: Silvio Man MD;  Location[facility];  Service: Colon and Rectal;;    tonsillectomy      TOTAL ABDOMINAL HYSTERECTOMY W/ BILATERAL SALPINGOOPHORECTOMY N/A 3/25/2020    Procedure: HYSTERECTOMY, TOTAL, ABDOMINAL, WITH BILATERAL SALPINGO-OOPHORECTOMY;  Surgeon: Keron Brady MD;  Location: [facility];  Service: Oncology;  Laterality: N/A;      Oncology History   Malignant neoplasm of sigmoid colon   3/16/2020 Initial Diagnosis    Malignant neoplasm of  sigmoid colon     3/31/2020 Cancer Staged    Staging form: Colon and Rectum, AJCC 8th Edition  - Clinical stage from 3/31/2020: Stage IIIC (cT4b, cN2a, cM0)     5/6/2020 -  Chemotherapy    Treatment Summary   Plan Name: OP FOLFOX 6 Q2W  Treatment Goal: Curative  Status: Active  Start Date: 5/6/2020  End Date: 10/9/2020 (Planned)  Provider: MAXWELL Dove MD  Chemotherapy: fluorouraciL injection 945 mg, 400 mg/m2 = 945 mg, Intravenous, Clinic/HOD 1 time, 4 of 12 cycles  Administration: 945 mg (5/6/2020), 945 mg (5/20/2020), 945 mg (6/3/2020), 945 mg (6/17/2020)  fluorouraciL 2,400 mg/m2 = 5,665 mg in sodium chloride 0.9% 240 mL chemo infusion, 2,400 mg/m2 = 5,665 mg, Intravenous, Over 46 hours, 4 of 12 cycles  Administration: 5,665 mg (5/6/2020), 5,665 mg (5/20/2020), 5,665 mg (6/3/2020), 5,665 mg (6/17/2020)  leucovorin calcium 900 mg in dextrose 5 % 250 mL infusion, 945 mg, Intravenous, Clinic/HOD 1 time, 4 of 12 cycles  Administration: 900 mg (5/6/2020), 900 mg (5/20/2020), 900 mg (6/3/2020), 900 mg (6/17/2020)  oxaliplatin (ELOXATIN) 200 mg in dextrose 5 % 500 mL chemo infusion, 201 mg, Intravenous, Clinic/HOD 1 time, 4 of 12 cycles  Administration: 200 mg (5/6/2020), 200 mg (5/20/2020), 200 mg (6/3/2020), 200 mg (6/17/2020)     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   4/3/2020 Initial Diagnosis    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     5/6/2020 -  Chemotherapy    Treatment Summary   Plan Name: OP FOLFOX 6 Q2W  Treatment Goal: Curative  Status: Active  Start Date: 5/6/2020  End Date: 10/9/2020 (Planned)  Provider: MAXWELL Dove MD  Chemotherapy: fluorouraciL injection 945 mg, 400 mg/m2 = 945 mg, Intravenous, Clinic/HOD 1 time, 3 of 12 cycles  Administration: 945 mg (5/6/2020), 945 mg (5/20/2020), 945 mg (6/3/2020)  fluorouraciL 2,400 mg/m2 = 5,665 mg in sodium chloride 0.9% 240 mL chemo infusion, 2,400 mg/m2 = 5,665 mg, Intravenous, Over 46 hours, 3 of 12 cycles  Administration: 5,665 mg (5/6/2020),  5,665 mg (5/20/2020), 5,665 mg (6/3/2020)  leucovorin calcium 900 mg in dextrose 5 % 250 mL infusion, 945 mg, Intravenous, Clinic/HOD 1 time, 3 of 12 cycles  Administration: 900 mg (5/6/2020), 900 mg (5/20/2020), 900 mg (6/3/2020)  oxaliplatin (ELOXATIN) 200 mg in dextrose 5 % 500 mL chemo infusion, 201 mg, Intravenous, Clinic/HOD 1 time, 3 of 12 cycles  Administration: 200 mg (5/6/2020), 200 mg (5/20/2020), 200 mg (6/3/2020)          Allergies:  Review of patient's allergies indicates:   Allergen Reactions    Penicillin g      unknown    Penicillins Hives     childhood allergy        HPI 50-year-old female who began having abdominal discomfort in June of 2019.  She was diagnosed with kidney stones and a CT scan showed some thickening in her colon.  She was then sent to see her PCP who recommended a colonoscopy.  The patient put this off until around January of 2020 when her PCP was adamant that she undergo further workup and she underwent colonoscopy in February of 2020.  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  The scans did not show obvious evidence of metastatic disease.  There was a small lesion in the liver that was concerning for a cyst in this will need to be followed closely.  She has healed from her surgery and she did have 37 lymph nodes removed of which 4 positive for cancer.     She completed cycle 4.  Overall she appears to be doing well.  She does have some mild depression and she has seen our psychologist this afternoon.  She remains on Wellbutrin.  She denies melena, hematochezia red blood per rectum.  She denies abdominal pain.  She does have some cold intolerance as expected with the oxaliplatin but denies any persistent neuropathy.  She denies fevers, chills or night sweats.  She does have  some sinus congestion and we did give her recommendations for over-the-counter medications today.      ECOG:  ECOG SCORE            Review of Systems   Constitutional: Negative for appetite change, chills, fatigue, fever and unexpected weight change.   HENT: Negative for congestion, facial swelling, mouth sores, sinus pressure, sinus pain, sore throat and trouble swallowing.    Eyes: Negative for photophobia, pain and visual disturbance.   Respiratory: Negative for cough, chest tightness, shortness of breath, wheezing and stridor.    Cardiovascular: Negative for chest pain and leg swelling.   Gastrointestinal: Negative for abdominal distention, abdominal pain, blood in stool, constipation, diarrhea, nausea, rectal pain and vomiting.   Genitourinary: Negative for dysuria, flank pain and urgency.   Musculoskeletal: Negative for back pain, gait problem, joint swelling and neck pain.   Skin: Negative for color change and rash.   Neurological: Negative for dizziness, syncope, weakness, light-headedness, numbness and headaches.   Hematological: Negative for adenopathy. Does not bruise/bleed easily.   Psychiatric/Behavioral: Negative for agitation, behavioral problems and confusion. The patient is not nervous/anxious.        Objective:      Physical Exam  Vitals signs reviewed.   Constitutional:       General: She is not in acute distress.     Appearance: She is well-developed. She is not diaphoretic.   HENT:      Head: Normocephalic.      Mouth/Throat:      Mouth: Mucous membranes are moist.   Eyes:      General: No scleral icterus.     Extraocular Movements: Extraocular movements intact.      Pupils: Pupils are equal, round, and reactive to light.   Neck:      Musculoskeletal: Normal range of motion and neck supple. No neck rigidity.      Trachea: No tracheal deviation.   Cardiovascular:      Rate and Rhythm: Normal rate.   Pulmonary:      Effort: Pulmonary effort is normal. No respiratory distress.      Breath sounds:  Normal breath sounds. No stridor. No wheezing.   Abdominal:      General: Bowel sounds are normal. There is no distension.      Palpations: Abdomen is soft. There is no mass.      Tenderness: There is no abdominal tenderness.   Musculoskeletal:         General: No deformity.   Skin:     Findings: No rash.   Neurological:      General: No focal deficit present.      Mental Status: She is alert and oriented to person, place, and time.      Cranial Nerves: No cranial nerve deficit.      Sensory: No sensory deficit.   Psychiatric:         Behavior: Behavior normal.         Thought Content: Thought content normal.         Judgment: Judgment normal.         Vitals:    06/29/20 1049   BP: (!) 163/80   Pulse: 70   Resp: 13   Temp: 98 °F (36.7 °C)         Results:     Lab Visit on 06/29/2020   Component Date Value Ref Range Status    WBC 06/29/2020 4.85  3.90 - 12.70 K/uL Final    RBC 06/29/2020 4.64  4.00 - 5.40 M/uL Final    Hemoglobin 06/29/2020 10.8* 12.0 - 16.0 g/dL Final    Hematocrit 06/29/2020 36.3* 37.0 - 48.5 % Final    Mean Corpuscular Volume 06/29/2020 78* 82 - 98 fL Final    Mean Corpuscular Hemoglobin 06/29/2020 23.3* 27.0 - 31.0 pg Final    Mean Corpuscular Hemoglobin Conc 06/29/2020 29.8* 32.0 - 36.0 g/dL Final    RDW 06/29/2020 18.8* 11.5 - 14.5 % Final    Platelets 06/29/2020 241  150 - 350 K/uL Final    MPV 06/29/2020 9.6  9.2 - 12.9 fL Final    Immature Granulocytes 06/29/2020 0.2  0.0 - 0.5 % Final    Gran # (ANC) 06/29/2020 2.1  1.8 - 7.7 K/uL Final    Immature Grans (Abs) 06/29/2020 0.01  0.00 - 0.04 K/uL Final    Comment: Mild elevation in immature granulocytes is non specific and   can be seen in a variety of conditions including stress response,   acute inflammation, trauma and pregnancy. Correlation with other   laboratory and clinical findings is essential.      Lymph # 06/29/2020 1.5  1.0 - 4.8 K/uL Final    Mono # 06/29/2020 0.8  0.3 - 1.0 K/uL Final    Eos # 06/29/2020 0.4  0.0  - 0.5 K/uL Final    Baso # 06/29/2020 0.04  0.00 - 0.20 K/uL Final    nRBC 06/29/2020 0  0 /100 WBC Final    Gran% 06/29/2020 43.5  38.0 - 73.0 % Final    Lymph% 06/29/2020 31.1  18.0 - 48.0 % Final    Mono% 06/29/2020 15.7* 4.0 - 15.0 % Final    Eosinophil% 06/29/2020 8.7* 0.0 - 8.0 % Final    Basophil% 06/29/2020 0.8  0.0 - 1.9 % Final    Differential Method 06/29/2020 Automated   Final    CEA 06/29/2020 1.2  0.0 - 5.0 ng/mL Final    Comment: [employer] immunometric chemiluminescent   methodology is used. Results obtained with different assay   methods cannot be used interchangeably.    Elevated concentrations of CEA may be found in smokers or in   patients with nonmalignant conditions. CEA concentrations,   regardless of level, should not be interpreted as absolute   evidence of the presence or absence of malignant disease.   The [employer] CEA assay is not recommended as a screening procedure   for cancer detection.   Patients taking very high Biotin doses of >300 mcg/day may   cause a negative bias in this assay.       Sodium 06/29/2020 138  136 - 145 mmol/L Final    Potassium 06/29/2020 3.9  3.5 - 5.1 mmol/L Final    Chloride 06/29/2020 108  95 - 110 mmol/L Final    CO2 06/29/2020 25  22 - 31 mmol/L Final    Glucose 06/29/2020 102  70 - 110 mg/dL Final    Comment: The ADA recommends the following guidelines for fasting glucose:  Normal:       less than 100 mg/dL  Prediabetes:  100 mg/dL to 125 mg/dL  Diabetes:     126 mg/dL or higher      BUN, Bld 06/29/2020 15  7 - 18 mg/dL Final    Creatinine 06/29/2020 0.72  0.50 - 1.40 mg/dL Final    Calcium 06/29/2020 10.7* 8.4 - 10.2 mg/dL Final    Total Protein 06/29/2020 7.1  6.0 - 8.4 g/dL Final    Albumin 06/29/2020 4.0  3.5 - 5.2 g/dL Final    Total Bilirubin 06/29/2020 0.2  0.2 - 1.3 mg/dL Final    Alkaline Phosphatase 06/29/2020 142  38 - 145 U/L Final    AST 06/29/2020 46* 14 - 36 U/L Final    ALT 06/29/2020 38* 0 - 35 U/L Final     Anion Gap 06/29/2020 5* 8 - 16 mmol/L Final    eGFR if African American 06/29/2020 >60  >60 mL/min/1.73 m^2 Final    eGFR if non African American 06/29/2020 >60  >60 mL/min/1.73 m^2 Final    Comment: Calculation used to obtain the estimated glomerular filtration  rate (eGFR) is the CKD-EPI equation.          Assessment:     1. Malignant neoplasm of sigmoid colon    2. 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 Psychophysiological insomnia    4. Anxiety    5. Other constipation    6. Nausea      Plan:   Plan: COVID-19 test pending    1,2,6.  Stage III colon cancer:  She will proceed with cycle 5 of FOLFOX tomorrow. We will continue with Emend in addition to the Aloxi.  She should continue her home Zofran.     3,4.  Anxiety and insomnia:   She has been on BuSpar the past.  She has also met with our psychologist.  Overall she is tolerating this well and we will continue to monitor closely.  I have encouraged her to follow up with our psychologist this afternoon as scheduled.  She states that she is also on Wellbutrin.     5.  Constipation: She will continue to take a stool softener daily and continue the MiraLax as needed.  She is aware to contact us with any problems or worsening symptoms will certainly see her sooner.      Pt displayed understanding of the above encounter and treatment plan. All thoughtful questions were answered to their satisfaction. Pte was advised to notify the care team or proceed to the ER if signs and symptoms worsen.

## 2020-06-29 NOTE — PROGRESS NOTES
TELEMEDICINE PSYCHO-ONCOLOGY INTAKE    Consultation started: 6/29/2020 at 4:01 PM   The chief complaint leading to consultation is: anxiety, depression, sleep, adaptation to disease and treatment  Virtual visit with synchronous audio and video    The patient location is:  Patient workplace in [place]   Patient alone at the time of consultation.    Each patient provided medical services by telemedicine is:  (1) informed of the relationship between the physician and patient and the respective role of any other health care provider with respect to management of the patient; and (2) notified that he or she may decline to receive medical services by telemedicine and may withdraw from such care at any time    Crisis Disclaimer: Patient was informed that due to the virtual nature of the visit, that if a crisis develops, protocols will be implemented to ensure patient safety, including but not limited to: 1) Initiating a welfare check with local Law Enforcement, 2) Calling [facility]/National Crisis Hotline, and/or 3) Initiating PEC/CEC procedures.     [facility]    Crisis notification: Maryanne Dias [phone]    Diagnostic Interview - CPT 69940    Date: 6/29/2020  Site of Clinician: [facility]     Evaluation Length (direct face-to-face time):  1 hour     Referral Source:Oncologist: MAXWELL Dove, *    PCP: Tima Mederos MD    Clinical status of patient: Outpatient    Gail Mckinnon, a 50 y.o. female, seen for initial evaluation visit.  Met with patient.    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 The patient is actively working to return to baseline exercise tolerance.     Family History:   Psychiatric illness: No     Alcohol/Drug Abuse: Yes (both brothers, both sons drugs)    Suicide: No      Past Psychiatric History:   Inpatient treatment: No     Outpatient treatment: No     Prior substance abuse treatment: No     Suicide Attempts: No     Psychotropic Medications:  Current: Ativan, Buspar, Desyrel and Wellbutrin       Past: Effexor, Lexapro , Paxil and Zoloft    Current medications as per below, allergies reviewed in chart.    Current Outpatient Medications   Medication    acetaminophen (TYLENOL) 500 MG tablet    ascorbic acid, vitamin C, (VITAMIN C) 1000 MG tablet    buPROPion (WELLBUTRIN SR) 150 MG TBSR 12 hr tablet    busPIRone (BUSPAR) 10 MG tablet    cyclobenzaprine (FLEXERIL) 10 MG tablet    levothyroxine (SYNTHROID) 200 MCG tablet    lidocaine-prilocaine (EMLA) cream    LORazepam (ATIVAN) 0.5 MG tablet    multivitamin (ONE DAILY MULTIVITAMIN) per tablet    ondansetron (ZOFRAN) 4 MG tablet    oxyCODONE (ROXICODONE) 10 mg Tab immediate release tablet    traMADol (ULTRAM) 50 mg tablet    traZODone (DESYREL) 50 MG tablet     No current facility-administered medications for this visit.         CAM Therapies: None     Screening:  Gissel: Denies Psychosis: Denies    Panic Disorder: Denies    Social/specific phobia: Denies OCD: Denies   Sexual Dysfunction:  Denies (no problems since surgery, slight  "decrease in drive due to depression, but maintained function and overall interest)    Trauma: Denies      Social situation/Stressors: Gail Mckinnon lives with her son in [place], [place].  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  She has been in her job for 10 years.    Gail Mckinnon has been / 1x (20 years) and has 3 adult children (sons 30, 26, daughter 21).  She has 1 living brother and 1 brother who  20 years ago due to accidental GSW. The patient reports good social support from her daughter, mother, and several close friends. She also has a male friend of 8 years who is supportive. Gail Mckinnon is an active member of the [employer].  Gail Mckinnon's hobbies include gardening and sewing.  Additional stressors:  30 year old son currently struggling with addiction    26 year old son has poorly controlled DMI and "almost lost his kidney in 2018" and lost his eye in 2019    Brother struggling with addiction and cirrhosis    Strengths:Steady employment, financial stability, Housing stability, Able to vocalize needs, Motivation, readiness for change, Setting and pursuing goals, hopes, dreams, aspirations and Interpersonal relationships and supports available - family, relatives, friends  Liabilities: Complicated medical illness    Current Evaluation:     Mental Status Exam: Gail Mckinnon arrived promptly for the assessment session (via video).  The patient was fully cooperative throughout the interview and was an adequate historian   Appearance: age appropriate, casually  dressed, well groomed  Behavior/Cooperation: friendly and cooperative  Speech: normal in rate, volume, and tone and appropriate quality, quantity and organization of sentences  Mood: anxious, depressed  Affect: tearful   Thought Process: goal-directed, logical  Thought Content: normal, no suicidality, no homicidality, delusions, or paranoia;did not " appear to be responding to internal stimuli during the interview.   Orientation: grossly intact  Memory: Grossly intact  Attention Span/Concentration: Attends to interview without distraction; reports subjective difficulty  Fund of Knowledge: average  Estimate of Intelligence: average from verbal skills and history  Cognition: grossly intact  Insight: patient has awareness of illness; good insight into own behavior and behavior of others  Judgment: the patient's behavior is adequate to circumstances    Distress Score    Distress Score: 7        Practical Problems Physical Problems   : No Appearance: Yes   Housing: No Bathing / Dressing: No   Insurance / Financial: No Breathing: No    Transportation: No  Changes in Urination: No    Work / School: No  Constipation: Yes   Treatment Decisions: No  Diarrhea: No     Eating: No    Family Problems Fatigue: Yes    Dealing with Children: No Feeling Swollen: No    Dealing with Partner: No Fevers: No    Ability to Have Children: No  Getting Around: No       Indigestion: No     Memory / Concentration: Yes   Emotional Problems Mouth Sores: Yes    Depression: Yes  Nausea: Yes    Fears: Yes  Nose Dry / Congested: No    Nervousness: Yes  Pain: No    Sadness: Yes Sexual: No    Worry: Yes Skin Dry / Itchy: No    Loss of Interest in Usual Activities: No Sleep: Yes     Substance Abuse: No    Spiritual/Religions Concerns Tingling in Hands / Feet: Yes   Spritual / Hindu Concerns: No         Other Problems            PHQ ANSWERS    Q1. Little interest or pleasure in doing things: Several days (06/26/20 0339)  Q2. Feeling down, depressed, or hopeless: More than half the days (06/26/20 0339)  Q3. Trouble falling or staying asleep, or sleeping too much: Nearly every day (06/26/20 0339)  Q4. Feeling tired or having little energy: Several days (06/26/20 0339)  Q5. Poor appetite or overeating: Several days (06/26/20 0339)  Q6. Feeling bad about yourself - or that you are a failure  or have let yourself or your family down: More than half the days (06/26/20 0339)  Q7. Trouble concentrating on things, such as reading the newspaper or watching television: Several days (06/26/20 0339)  Q8. Moving or speaking so slowly that other people could have noticed. Or the opposite - being so fidgety or restless that you have been moving around a lot more than usual: Several days (06/26/20 0339)  Q9. Thoughts that you would be better off dead, or of hurting yourself in some way: Not at all (06/26/20 0339)    PHQ8 Score : 12 (06/26/20 0339)  PHQ-9 Total Score: 12 (06/26/20 0339)     TOMAS-7 Answers    GAD7 6/26/2020   1. Feeling nervous, anxious, or on edge? 1   2. Not being able to stop or control worrying? 1   3. Worrying too much about different things? 1   4. Trouble relaxing? 2   5. Being so restless that it is hard to sit still? 1   6. Becoming easily annoyed or irritable? 1   7. Feeling afraid as if something awful might happen? 1   TOMAS-7 Score 8     TOMAS-7 Score: 8  Interpretation: Mild Anxiety       History of present illness:    Oncology History   Malignant neoplasm of sigmoid colon   3/16/2020 Initial Diagnosis    Malignant neoplasm of sigmoid colon     3/31/2020 Cancer Staged    Staging form: Colon and Rectum, AJCC 8th Edition  - Clinical stage from 3/31/2020: Stage IIIC (cT4b, cN2a, cM0)     5/6/2020 -  Chemotherapy    Treatment Summary   Plan Name: OP FOLFOX 6 Q2W  Treatment Goal: Curative  Status: Active  Start Date: 5/6/2020  End Date: 10/8/2020 (Planned)  Provider: MAXWELL Dove MD  Chemotherapy: fluorouraciL injection 945 mg, 400 mg/m2 = 945 mg, Intravenous, Clinic/HOD 1 time, 4 of 12 cycles  Administration: 945 mg (5/6/2020), 945 mg (5/20/2020), 945 mg (6/3/2020), 945 mg (6/17/2020)  fluorouraciL 2,400 mg/m2 = 5,665 mg in sodium chloride 0.9% 240 mL chemo infusion, 2,400 mg/m2 = 5,665 mg, Intravenous, Over 46 hours, 4 of 12 cycles  Administration: 5,665 mg (5/6/2020), 5,665 mg  (5/20/2020), 5,665 mg (6/3/2020), 5,665 mg (6/17/2020)  leucovorin calcium 900 mg in dextrose 5 % 250 mL infusion, 945 mg, Intravenous, Clinic/HOD 1 time, 4 of 12 cycles  Administration: 900 mg (5/6/2020), 900 mg (5/20/2020), 900 mg (6/3/2020), 900 mg (6/17/2020)  oxaliplatin (ELOXATIN) 200 mg in dextrose 5 % 500 mL chemo infusion, 201 mg, Intravenous, Clinic/HOD 1 time, 4 of 12 cycles  Administration: 200 mg (5/6/2020), 200 mg (5/20/2020), 200 mg (6/3/2020), 200 mg (6/17/2020)     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   4/3/2020 Initial Diagnosis    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     5/6/2020 -  Chemotherapy    Treatment Summary   Plan Name: OP FOLFOX 6 Q2W  Treatment Goal: Curative  Status: Active  Start Date: 5/6/2020  End Date: 10/9/2020 (Planned)  Provider: MAXWELL Dove MD  Chemotherapy: fluorouraciL injection 945 mg, 400 mg/m2 = 945 mg, Intravenous, Clinic/HOD 1 time, 3 of 12 cycles  Administration: 945 mg (5/6/2020), 945 mg (5/20/2020), 945 mg (6/3/2020)  fluorouraciL 2,400 mg/m2 = 5,665 mg in sodium chloride 0.9% 240 mL chemo infusion, 2,400 mg/m2 = 5,665 mg, Intravenous, Over 46 hours, 3 of 12 cycles  Administration: 5,665 mg (5/6/2020), 5,665 mg (5/20/2020), 5,665 mg (6/3/2020)  leucovorin calcium 900 mg in dextrose 5 % 250 mL infusion, 945 mg, Intravenous, Clinic/HOD 1 time, 3 of 12 cycles  Administration: 900 mg (5/6/2020), 900 mg (5/20/2020), 900 mg (6/3/2020)  oxaliplatin (ELOXATIN) 200 mg in dextrose 5 % 500 mL chemo infusion, 201 mg, Intravenous, Clinic/HOD 1 time, 3 of 12 cycles  Administration: 200 mg (5/6/2020), 200 mg (5/20/2020), 200 mg (6/3/2020)         Gail Mckinnon has adjusted to illness with difficulty primarily through active coping strategies, focus on alternative activities and focus on work.  She would likely be coping adequately if not for numerous additional stressors. She was very worried about wound healing after surgery due to previous wound care challenges.  Her weight is a concern (has gained 7 lbs since started chemotherapy).  She has engaged in appropriate information gathering.  The patient has good family/friend support.  Her support system is coping adequately with the diagnosis/treatment/prognosis. Illness-related psychosocial stressors include absence from work, difficulty meeting family responsibilities, changes in ability to engage in leisure activities and limitations on sexual interactions (until this weekend). The patient has a good partnership with her [facility] oncology treatment team. The patient reports the following barriers to cancer care:none.   Symptoms:   · Mood: depressed mood, diminished interest, weight gain, insomnia, psychomotor retardation, fatigue, worthlessness/guilt, poor concentration and tearfulness;  prior depression: since teen years and no SI/HI; prior thoughts of suicide during divorce, no actions; not currently taking Wellbutrin as prescribed (often forgets nighttime dose);  PHQ-9=12  · Anxiety: Feeling nervous, anxious, or on edge, Uncontrollable worry (about bills, health, sons, relationship), Excessive worry (interfering with sleep, enjoyment), Difficulty relaxing, Restlessness, Irritability, Fear of unknown and panic attacks; lifelong anxiety;  TOMAS-7=8  · Substance abuse: denied  · Cognitive functioning: none  · Health behaviors: noncontributory  · Sleep: good sleep prior to cancer diagnosis, current problems mainly during chemo week,  restless sleep  and non-restorative sleep , 30 min+ extended sleep onset latency and several x WASO (with variable re-onset difficulty),  Tylenol PM and Trazodone (variably) for sleep  .       Assessment - Diagnosis - Goals:       ICD-10-CM ICD-9-CM   1. Dysthymia  F34.1 300.4   2. Generalized anxiety disorder with panic attacks  F41.1 300.02    F41.0 300.01   3. Psychophysiological insomnia  F51.04 307.42       Plan:individual psychotherapy and medication management by physician    Summary and  Recommendations  Gail Mckinnon is a 50 y.o. female referred by Dr. Dove for psychological evaluation and treatment.  Ms. Mckinnon appears to be very overwhelmed in coping with her diagnosis and treatment course, largely due to serious pre-existing psychosocial stressors and significant pre-existing anxiety.  She is interested in CBT to address depression/anxiety/insomnia and will follow up with me for that purpose. Mood protective strategies during cancer treatment were discussed.  Patient is struggling to remember her night dose of Wellbutrin and might benefit from consideration of transition to extended release formulation (Wellbutrin XL).     GOALS:   Purchase and begin reading Feeling Good  Be Well Audio relaxation exercises   Medications as per MD Hall[place]ight for support  No focus on weight loss while overwhelmed- focus on weight maintenance    RTC x 2 weeks    Lee Rowe, PhD  Clinical Psychologist  [place] License #[id number]

## 2020-06-30 ENCOUNTER — INFUSION (OUTPATIENT)
Dept: INFUSION THERAPY | Facility: HOSPITAL | Age: 50
End: 2020-06-30
Attending: INTERNAL MEDICINE
Payer: COMMERCIAL

## 2020-06-30 VITALS
SYSTOLIC BLOOD PRESSURE: 129 MMHG | HEART RATE: 76 BPM | HEIGHT: 66 IN | WEIGHT: 265 LBS | RESPIRATION RATE: 18 BRPM | OXYGEN SATURATION: 96 % | DIASTOLIC BLOOD PRESSURE: 83 MMHG | BODY MASS INDEX: 42.59 KG/M2 | TEMPERATURE: 98 F

## 2020-06-30 DIAGNOSIS — C77.2 METASTASIS TO INTESTINAL LYMPH NODE: Primary | ICD-10-CM

## 2020-06-30 DIAGNOSIS — C18.7 MALIGNANT NEOPLASM OF SIGMOID COLON: ICD-10-CM

## 2020-06-30 PROCEDURE — 96413 CHEMO IV INFUSION 1 HR: CPT | Mod: PN

## 2020-06-30 PROCEDURE — A4216 STERILE WATER/SALINE, 10 ML: HCPCS | Mod: PN | Performed by: INTERNAL MEDICINE

## 2020-06-30 PROCEDURE — 25000003 PHARM REV CODE 250: Mod: PN | Performed by: INTERNAL MEDICINE

## 2020-06-30 PROCEDURE — 96367 TX/PROPH/DG ADDL SEQ IV INF: CPT | Mod: PN

## 2020-06-30 PROCEDURE — 63600175 PHARM REV CODE 636 W HCPCS: Mod: PN | Performed by: INTERNAL MEDICINE

## 2020-06-30 PROCEDURE — 96368 THER/DIAG CONCURRENT INF: CPT | Mod: PN

## 2020-06-30 PROCEDURE — S0028 INJECTION, FAMOTIDINE, 20 MG: HCPCS | Mod: PN | Performed by: INTERNAL MEDICINE

## 2020-06-30 PROCEDURE — 96375 TX/PRO/DX INJ NEW DRUG ADDON: CPT | Mod: PN

## 2020-06-30 RX ORDER — FLUOROURACIL 50 MG/ML
400 INJECTION, SOLUTION INTRAVENOUS
Status: DISCONTINUED | OUTPATIENT
Start: 2020-06-30 | End: 2020-06-30 | Stop reason: HOSPADM

## 2020-06-30 RX ORDER — METHYLPREDNISOLONE SOD SUCC 125 MG
125 VIAL (EA) INJECTION
Status: COMPLETED | OUTPATIENT
Start: 2020-06-30 | End: 2020-06-30

## 2020-06-30 RX ORDER — FAMOTIDINE 10 MG/ML
20 INJECTION INTRAVENOUS
Status: COMPLETED | OUTPATIENT
Start: 2020-06-30 | End: 2020-06-30

## 2020-06-30 RX ORDER — DIPHENHYDRAMINE HYDROCHLORIDE 50 MG/ML
50 INJECTION INTRAMUSCULAR; INTRAVENOUS ONCE AS NEEDED
Status: COMPLETED | OUTPATIENT
Start: 2020-06-30 | End: 2020-06-30

## 2020-06-30 RX ORDER — SODIUM CHLORIDE 0.9 % (FLUSH) 0.9 %
10 SYRINGE (ML) INJECTION
Status: DISCONTINUED | OUTPATIENT
Start: 2020-06-30 | End: 2020-06-30 | Stop reason: HOSPADM

## 2020-06-30 RX ORDER — EPINEPHRINE 0.3 MG/.3ML
0.3 INJECTION SUBCUTANEOUS ONCE AS NEEDED
Status: DISCONTINUED | OUTPATIENT
Start: 2020-06-30 | End: 2020-06-30 | Stop reason: HOSPADM

## 2020-06-30 RX ORDER — HEPARIN 100 UNIT/ML
500 SYRINGE INTRAVENOUS
Status: DISCONTINUED | OUTPATIENT
Start: 2020-06-30 | End: 2020-06-30 | Stop reason: HOSPADM

## 2020-06-30 RX ADMIN — FAMOTIDINE 20 MG: 10 INJECTION INTRAVENOUS at 03:06

## 2020-06-30 RX ADMIN — PALONOSETRON HYDROCHLORIDE: 0.25 INJECTION, SOLUTION INTRAVENOUS at 02:06

## 2020-06-30 RX ADMIN — OXALIPLATIN 201 MG: 100 INJECTION, SOLUTION, CONCENTRATE INTRAVENOUS at 02:06

## 2020-06-30 RX ADMIN — APREPITANT 130 MG: 130 INJECTION, EMULSION INTRAVENOUS at 02:06

## 2020-06-30 RX ADMIN — LEUCOVORIN CALCIUM 945 MG: 350 INJECTION, POWDER, LYOPHILIZED, FOR SOLUTION INTRAMUSCULAR; INTRAVENOUS at 02:06

## 2020-06-30 RX ADMIN — DIPHENHYDRAMINE HYDROCHLORIDE 50 MG: 50 INJECTION INTRAMUSCULAR; INTRAVENOUS at 03:06

## 2020-06-30 RX ADMIN — METHYLPREDNISOLONE SODIUM SUCCINATE 125 MG: 125 INJECTION, POWDER, FOR SOLUTION INTRAMUSCULAR; INTRAVENOUS at 03:06

## 2020-06-30 RX ADMIN — DEXTROSE: 5 SOLUTION INTRAVENOUS at 02:06

## 2020-06-30 RX ADMIN — Medication 10 ML: at 05:06

## 2020-06-30 RX ADMIN — HEPARIN 500 UNITS: 100 SYRINGE at 05:06

## 2020-06-30 NOTE — NURSING
Oxaliplatin & Leucovorin stopped at 1506 with pt having c/o of itching palms & head, stopped up ears and tongue swelling and hard to speak. Hypersensitivity kit administered. Charge nurse notified MD's office and new orders received to hold treatment for the day and to monitor pt till tongue symptoms resolve. At 1740, pt requested to leave as all symptoms completely resolved except for pt's swollen tongue (which was much better). Charge nurse notified Dr Lal whom instructed pt to take PO Benadryl at home and to seek medical attention if symptoms worsen, pt verbalized understanding. See VS flow sheet and MAR for more info.

## 2020-06-30 NOTE — PLAN OF CARE
Problem: Adult Inpatient Plan of Care  Goal: Plan of Care Review  6/30/2020 1813 by Silvana Varela, RN  Outcome: Ongoing, Progressing  Flowsheets (Taken 6/30/2020 1745)  Plan of Care Reviewed With: patient   Pt did not tolerated infusion, pt had c/o of itching and swollen tongue, Taxol stopped at 1506 pm, hypersensitivity kit administered, MD notified, treatment not continued. Pt was monitored till pt requested to leave at 1740 with her tongue not totally resolved. Charge nurse notified Dr Lal whom instructed pt to take PO Benadryl once home and to inform someone if symptoms worsen. Pt voiced understanding. Pt given a schedule and reviewed, pt verbalized understanding. Pt ambulated out of the clinic without difficulty to family waiting outside.

## 2020-06-30 NOTE — PLAN OF CARE
Problem: Adult Inpatient Plan of Care  Goal: Patient-Specific Goal (Individualization)  Outcome: Ongoing, Progressing  Flowsheets (Taken 6/30/2020 1400)  Individualized Care Needs: warm blanket, recliner  Anxieties, Fears or Concerns: side effects  Patient-Specific Goals (Include Timeframe): infectin prevention during treatment     Problem: Fatigue (Oncology Care)  Goal: Improved Activity Tolerance  Intervention: Promote Energy Conservation  Flowsheets (Taken 6/30/2020 1400)  Fatigue Management:   activity schedule adjusted   frequent rest breaks encouraged   paced activity encouraged   fatigue-related activity identified  Sleep/Rest Enhancement:   relaxation techniques promoted   regular sleep/rest pattern promoted  Activity Management:   ambulated - L4   ambulated in reyes - L4

## 2020-07-01 DIAGNOSIS — C18.7 MALIGNANT NEOPLASM OF SIGMOID COLON: Primary | ICD-10-CM

## 2020-07-02 ENCOUNTER — LAB VISIT (OUTPATIENT)
Dept: LAB | Facility: HOSPITAL | Age: 50
End: 2020-07-02
Attending: INTERNAL MEDICINE
Payer: COMMERCIAL

## 2020-07-02 ENCOUNTER — OFFICE VISIT (OUTPATIENT)
Dept: HEMATOLOGY/ONCOLOGY | Facility: CLINIC | Age: 50
End: 2020-07-02
Payer: COMMERCIAL

## 2020-07-02 VITALS
SYSTOLIC BLOOD PRESSURE: 126 MMHG | WEIGHT: 264.75 LBS | RESPIRATION RATE: 20 BRPM | BODY MASS INDEX: 42.74 KG/M2 | DIASTOLIC BLOOD PRESSURE: 82 MMHG | HEART RATE: 72 BPM | TEMPERATURE: 98 F | OXYGEN SATURATION: 98 %

## 2020-07-02 DIAGNOSIS — C18.7 MALIGNANT NEOPLASM OF SIGMOID COLON: ICD-10-CM

## 2020-07-02 DIAGNOSIS — T45.1X5S ADVERSE EFFECT OF CHEMOTHERAPY, SEQUELA: ICD-10-CM

## 2020-07-02 PROBLEM — T45.1X5A CHEMOTHERAPY ADVERSE REACTION: Status: ACTIVE | Noted: 2020-07-02

## 2020-07-02 LAB
ALBUMIN SERPL BCP-MCNC: 4.1 G/DL (ref 3.5–5.2)
ALP SERPL-CCNC: 140 U/L (ref 38–145)
ALT SERPL W/O P-5'-P-CCNC: 53 U/L (ref 0–35)
ANION GAP SERPL CALC-SCNC: 5 MMOL/L (ref 8–16)
AST SERPL-CCNC: 54 U/L (ref 14–36)
BASOPHILS # BLD AUTO: 0.06 K/UL (ref 0–0.2)
BASOPHILS NFR BLD: 1.2 % (ref 0–1.9)
BILIRUB SERPL-MCNC: 0.3 MG/DL (ref 0.2–1.3)
BUN SERPL-MCNC: 13 MG/DL (ref 7–18)
CALCIUM SERPL-MCNC: 10.7 MG/DL (ref 8.4–10.2)
CHLORIDE SERPL-SCNC: 109 MMOL/L (ref 95–110)
CO2 SERPL-SCNC: 27 MMOL/L (ref 22–31)
CREAT SERPL-MCNC: 0.79 MG/DL (ref 0.5–1.4)
DIFFERENTIAL METHOD: ABNORMAL
EOSINOPHIL # BLD AUTO: 0.2 K/UL (ref 0–0.5)
EOSINOPHIL NFR BLD: 3.2 % (ref 0–8)
ERYTHROCYTE [DISTWIDTH] IN BLOOD BY AUTOMATED COUNT: 19.5 % (ref 11.5–14.5)
EST. GFR  (AFRICAN AMERICAN): >60 ML/MIN/1.73 M^2
EST. GFR  (NON AFRICAN AMERICAN): >60 ML/MIN/1.73 M^2
GLUCOSE SERPL-MCNC: 85 MG/DL (ref 70–110)
HCT VFR BLD AUTO: 37.4 % (ref 37–48.5)
HGB BLD-MCNC: 11 G/DL (ref 12–16)
IMM GRANULOCYTES # BLD AUTO: 0.01 K/UL (ref 0–0.04)
IMM GRANULOCYTES NFR BLD AUTO: 0.2 % (ref 0–0.5)
LYMPHOCYTES # BLD AUTO: 1.4 K/UL (ref 1–4.8)
LYMPHOCYTES NFR BLD: 27.3 % (ref 18–48)
MCH RBC QN AUTO: 23.2 PG (ref 27–31)
MCHC RBC AUTO-ENTMCNC: 29.4 G/DL (ref 32–36)
MCV RBC AUTO: 79 FL (ref 82–98)
MONOCYTES # BLD AUTO: 0.8 K/UL (ref 0.3–1)
MONOCYTES NFR BLD: 15.6 % (ref 4–15)
NEUTROPHILS # BLD AUTO: 2.7 K/UL (ref 1.8–7.7)
NEUTROPHILS NFR BLD: 52.5 % (ref 38–73)
NRBC BLD-RTO: 0 /100 WBC
PLATELET # BLD AUTO: 265 K/UL (ref 150–350)
PMV BLD AUTO: 9.8 FL (ref 9.2–12.9)
POTASSIUM SERPL-SCNC: 3.6 MMOL/L (ref 3.5–5.1)
PROT SERPL-MCNC: 7.1 G/DL (ref 6–8.4)
RBC # BLD AUTO: 4.74 M/UL (ref 4–5.4)
SODIUM SERPL-SCNC: 141 MMOL/L (ref 136–145)
WBC # BLD AUTO: 5.05 K/UL (ref 3.9–12.7)

## 2020-07-02 PROCEDURE — 99999 PR PBB SHADOW E&M-EST. PATIENT-LVL IV: CPT | Mod: PBBFAC,,, | Performed by: INTERNAL MEDICINE

## 2020-07-02 PROCEDURE — 3079F PR MOST RECENT DIASTOLIC BLOOD PRESSURE 80-89 MM HG: ICD-10-PCS | Mod: CPTII,S$GLB,, | Performed by: INTERNAL MEDICINE

## 2020-07-02 PROCEDURE — 3074F PR MOST RECENT SYSTOLIC BLOOD PRESSURE < 130 MM HG: ICD-10-PCS | Mod: CPTII,S$GLB,, | Performed by: INTERNAL MEDICINE

## 2020-07-02 PROCEDURE — 85025 COMPLETE CBC W/AUTO DIFF WBC: CPT | Mod: PN

## 2020-07-02 PROCEDURE — 85025 COMPLETE CBC W/AUTO DIFF WBC: CPT

## 2020-07-02 PROCEDURE — 99999 PR PBB SHADOW E&M-EST. PATIENT-LVL IV: ICD-10-PCS | Mod: PBBFAC,,, | Performed by: INTERNAL MEDICINE

## 2020-07-02 PROCEDURE — 3008F BODY MASS INDEX DOCD: CPT | Mod: CPTII,S$GLB,, | Performed by: INTERNAL MEDICINE

## 2020-07-02 PROCEDURE — 3079F DIAST BP 80-89 MM HG: CPT | Mod: CPTII,S$GLB,, | Performed by: INTERNAL MEDICINE

## 2020-07-02 PROCEDURE — 36415 COLL VENOUS BLD VENIPUNCTURE: CPT | Mod: PN

## 2020-07-02 PROCEDURE — 3008F PR BODY MASS INDEX (BMI) DOCUMENTED: ICD-10-PCS | Mod: CPTII,S$GLB,, | Performed by: INTERNAL MEDICINE

## 2020-07-02 PROCEDURE — 99215 PR OFFICE/OUTPT VISIT, EST, LEVL V, 40-54 MIN: ICD-10-PCS | Mod: S$GLB,,, | Performed by: INTERNAL MEDICINE

## 2020-07-02 PROCEDURE — 80053 COMPREHEN METABOLIC PANEL: CPT | Mod: PN

## 2020-07-02 PROCEDURE — 80053 COMPREHEN METABOLIC PANEL: CPT

## 2020-07-02 PROCEDURE — 3074F SYST BP LT 130 MM HG: CPT | Mod: CPTII,S$GLB,, | Performed by: INTERNAL MEDICINE

## 2020-07-02 PROCEDURE — 99215 OFFICE O/P EST HI 40 MIN: CPT | Mod: S$GLB,,, | Performed by: INTERNAL MEDICINE

## 2020-07-02 NOTE — PROGRESS NOTES
Subjective:       Patient ID: Gail Mckinnon is a 50 y.o. female.    Chief Complaint: Colon cancer      History:  Past Medical History:   Diagnosis Date    Anxiety     Depression     FH: ovarian cancer 3/16/2020    Hx of psychiatric care     Effexor, Paxil, Lexapro, Zoloft, Wellbutrin, Trazodone Buspar    Hyperthyroidism     Hypothyroid     Kidney calculi     Malignant neoplasm of sigmoid colon 3/16/2020    Menorrhagia     Multinodular goiter 2012    Palpitation     Psychiatric problem     Venous insufficiency      Past Surgical History:   Procedure Laterality Date     SECTION, CLASSIC      x3    COLONOSCOPY N/A 2020    Procedure: COLONOSCOPY;  Surgeon: Shane Parker MD;  Location: [facility];  Service: Endoscopy;  Laterality: N/A;    CYSTOSCOPY W/ URETERAL STENT PLACEMENT Bilateral 3/25/2020    Procedure: CYSTOSCOPY, WITH URETERAL STENT INSERTION;  Surgeon: Claudio Tyson MD;  Location: [facility] OR [address] [place] [address];  Service: Urology;  Laterality: Bilateral;    INSERTION OF TUNNELED CENTRAL VENOUS CATHETER (CVC) WITH SUBCUTANEOUS PORT N/A 2020    Procedure: JAHENNMMK-CZON-X-CATH;  Surgeon: [facility];  Location: [facility] OR [address] [place] [address];  Service: Radiology;  Laterality: N/A;  Room 189/Cindy    LAPAROSCOPIC SIGMOIDECTOMY N/A 3/25/2020    Procedure: COLECTOMY, SIGMOID, LAPAROSCOPIC, flex sig, ERAS high;  Surgeon: Silvio Man MD;  Location: [facility] OR [address] [place] [address];  Service: Colon and Rectal;  Laterality: N/A;    MOBILIZATION OF SPLENIC FLEXURE  3/25/2020    Procedure: MOBILIZATION, SPLENIC FLEXURE;  Surgeon: Silvio Man MD;  Location: [facility] OR [address] [place] [address];  Service: Colon and Rectal;;    tonsillectomy      TOTAL ABDOMINAL HYSTERECTOMY W/ BILATERAL SALPINGOOPHORECTOMY N/A 3/25/2020    Procedure: HYSTERECTOMY, TOTAL, ABDOMINAL, WITH BILATERAL SALPINGO-OOPHORECTOMY;  Surgeon: Keron Brady MD;  Location: [facility] OR [address] [place] [address];  Service: Oncology;  Laterality: N/A;       Oncology History   Malignant neoplasm of sigmoid colon   3/16/2020 Initial Diagnosis    Malignant neoplasm of sigmoid colon     3/31/2020 Cancer Staged    Staging form: Colon and Rectum, AJCC 8th Edition  - Clinical stage from 3/31/2020: Stage IIIC (cT4b, cN2a, cM0)     5/6/2020 -  Chemotherapy    Treatment Summary   Plan Name: OP FOLFOX 6 Q2W  Treatment Goal: Curative  Status: Active  Start Date: 5/6/2020  End Date: 10/8/2020 (Planned)  Provider: MAXWELL Dove MD  Chemotherapy: fluorouraciL injection 945 mg, 400 mg/m2 = 945 mg, Intravenous, Clinic/HOD 1 time, 5 of 12 cycles  Administration: 945 mg (5/6/2020), 945 mg (5/20/2020), 945 mg (6/3/2020), 945 mg (6/17/2020)  fluorouraciL 2,400 mg/m2 = 5,665 mg in sodium chloride 0.9% 240 mL chemo infusion, 2,400 mg/m2 = 5,665 mg, Intravenous, Over 46 hours, 5 of 12 cycles  Administration: 5,665 mg (5/6/2020), 5,665 mg (5/20/2020), 5,665 mg (6/3/2020), 5,665 mg (6/17/2020)  leucovorin calcium 900 mg in dextrose 5 % 250 mL infusion, 945 mg, Intravenous, Clinic/HOD 1 time, 5 of 12 cycles  Administration: 900 mg (5/6/2020), 900 mg (5/20/2020), 900 mg (6/3/2020), 900 mg (6/17/2020), 945 mg (6/30/2020)  oxaliplatin (ELOXATIN) 200 mg in dextrose 5 % 500 mL chemo infusion, 201 mg, Intravenous, Clinic/HOD 1 time, 5 of 12 cycles  Administration: 200 mg (5/6/2020), 200 mg (5/20/2020), 200 mg (6/3/2020), 200 mg (6/17/2020), 201 mg (6/30/2020)     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   4/3/2020 Initial Diagnosis    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     5/6/2020 -  Chemotherapy    Treatment Summary   Plan Name: OP FOLFOX 6 Q2W  Treatment Goal: Curative  Status: Active  Start Date: 5/6/2020  End Date: 10/9/2020 (Planned)  Provider: MAXWELL Dove MD  Chemotherapy: fluorouraciL injection 945 mg, 400 mg/m2 = 945 mg, Intravenous, Clinic/HOD 1 time, 3 of 12 cycles  Administration: 945 mg (5/6/2020), 945 mg (5/20/2020), 945 mg (6/3/2020)  fluorouraciL 2,400 mg/m2 = 5,665  mg in sodium chloride 0.9% 240 mL chemo infusion, 2,400 mg/m2 = 5,665 mg, Intravenous, Over 46 hours, 3 of 12 cycles  Administration: 5,665 mg (5/6/2020), 5,665 mg (5/20/2020), 5,665 mg (6/3/2020)  leucovorin calcium 900 mg in dextrose 5 % 250 mL infusion, 945 mg, Intravenous, Clinic/HOD 1 time, 3 of 12 cycles  Administration: 900 mg (5/6/2020), 900 mg (5/20/2020), 900 mg (6/3/2020)  oxaliplatin (ELOXATIN) 200 mg in dextrose 5 % 500 mL chemo infusion, 201 mg, Intravenous, Clinic/HOD 1 time, 3 of 12 cycles  Administration: 200 mg (5/6/2020), 200 mg (5/20/2020), 200 mg (6/3/2020)          Allergies:  Review of patient's allergies indicates:   Allergen Reactions    Penicillin g      unknown    Penicillins Hives     childhood allergy        Follow-up  Pertinent negatives include no abdominal pain, chest pain, chills, congestion, coughing, fatigue, fever, headaches, joint swelling, nausea, neck pain, numbness, rash, sore throat, vomiting or weakness.    50-year-old female who began having abdominal discomfort in June of 2019.  She was diagnosed with kidney stones and a CT scan showed some thickening in her colon.  She was then sent to see her PCP who recommended a colonoscopy.  The patient put this off until around January of 2020 when her PCP was adamant that she undergo further workup and she underwent colonoscopy in February of 2020.  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  The scans did not show obvious evidence of metastatic disease.  There was a small lesion in the liver that was concerning for a cyst in this will need to be followed closely.  She has healed from her surgery and she did have 37 lymph nodes removed of which 4 positive for cancer.     She completed cycle 4.  We were attempting cycle 5.  When the  patient had an allergic reaction to the oxaliplatin.  She recovered nicely and overall feels okay today however I am very hesitant to reinitiate the oxaliplatin as she did have shortness of breath and tongue swelling.  All of this has resolved.  She denies fevers, chills or night sweats.  She denies chest pain or shortness of breath.  She denies any recurrence of the tongue swelling.  She denies any abdominal pain.  She denies any blood in her stool.    ECOG:  ECOG SCORE            Review of Systems   Constitutional: Negative for appetite change, chills, fatigue, fever and unexpected weight change.   HENT: Negative for congestion, facial swelling, mouth sores, sinus pressure, sinus pain, sore throat and trouble swallowing.    Eyes: Negative for photophobia, pain and visual disturbance.   Respiratory: Negative for cough, chest tightness, shortness of breath, wheezing and stridor.    Cardiovascular: Negative for chest pain and leg swelling.   Gastrointestinal: Negative for abdominal distention, abdominal pain, blood in stool, constipation, diarrhea, nausea, rectal pain and vomiting.   Genitourinary: Negative for dysuria, flank pain and urgency.   Musculoskeletal: Negative for back pain, gait problem, joint swelling and neck pain.   Skin: Negative for color change and rash.   Neurological: Negative for dizziness, syncope, weakness, light-headedness, numbness and headaches.   Hematological: Negative for adenopathy. Does not bruise/bleed easily.   Psychiatric/Behavioral: Negative for agitation, behavioral problems and confusion. The patient is not nervous/anxious.        Objective:      Physical Exam  Vitals signs reviewed.   Constitutional:       General: She is not in acute distress.     Appearance: She is well-developed. She is not diaphoretic.   HENT:      Head: Normocephalic.      Mouth/Throat:      Mouth: Mucous membranes are moist.   Eyes:      General: No scleral icterus.     Extraocular Movements: Extraocular  movements intact.      Pupils: Pupils are equal, round, and reactive to light.   Neck:      Musculoskeletal: Normal range of motion and neck supple. No neck rigidity.      Trachea: No tracheal deviation.   Cardiovascular:      Rate and Rhythm: Normal rate.   Pulmonary:      Effort: Pulmonary effort is normal. No respiratory distress.      Breath sounds: Normal breath sounds. No stridor. No wheezing.   Abdominal:      General: Bowel sounds are normal. There is no distension.      Palpations: Abdomen is soft. There is no mass.      Tenderness: There is no abdominal tenderness.   Musculoskeletal:         General: No deformity.   Skin:     Findings: No rash.   Neurological:      General: No focal deficit present.      Mental Status: She is alert and oriented to person, place, and time.      Cranial Nerves: No cranial nerve deficit.      Sensory: No sensory deficit.   Psychiatric:         Behavior: Behavior normal.         Thought Content: Thought content normal.         Judgment: Judgment normal.         Vitals:    07/02/20 1127   BP: 126/82   Pulse: 72   Resp: 20   Temp: 98 °F (36.7 °C)         Results:     Lab Visit on 07/02/2020   Component Date Value Ref Range Status    WBC 07/02/2020 5.05  3.90 - 12.70 K/uL Final    RBC 07/02/2020 4.74  4.00 - 5.40 M/uL Final    Hemoglobin 07/02/2020 11.0* 12.0 - 16.0 g/dL Final    Hematocrit 07/02/2020 37.4  37.0 - 48.5 % Final    Mean Corpuscular Volume 07/02/2020 79* 82 - 98 fL Final    Mean Corpuscular Hemoglobin 07/02/2020 23.2* 27.0 - 31.0 pg Final    Mean Corpuscular Hemoglobin Conc 07/02/2020 29.4* 32.0 - 36.0 g/dL Final    RDW 07/02/2020 19.5* 11.5 - 14.5 % Final    Platelets 07/02/2020 265  150 - 350 K/uL Final    MPV 07/02/2020 9.8  9.2 - 12.9 fL Final    Immature Granulocytes 07/02/2020 0.2  0.0 - 0.5 % Final    Gran # (ANC) 07/02/2020 2.7  1.8 - 7.7 K/uL Final    Immature Grans (Abs) 07/02/2020 0.01  0.00 - 0.04 K/uL Final    Comment: Mild elevation in  immature granulocytes is non specific and   can be seen in a variety of conditions including stress response,   acute inflammation, trauma and pregnancy. Correlation with other   laboratory and clinical findings is essential.      Lymph # 07/02/2020 1.4  1.0 - 4.8 K/uL Final    Mono # 07/02/2020 0.8  0.3 - 1.0 K/uL Final    Eos # 07/02/2020 0.2  0.0 - 0.5 K/uL Final    Baso # 07/02/2020 0.06  0.00 - 0.20 K/uL Final    nRBC 07/02/2020 0  0 /100 WBC Final    Gran% 07/02/2020 52.5  38.0 - 73.0 % Final    Lymph% 07/02/2020 27.3  18.0 - 48.0 % Final    Mono% 07/02/2020 15.6* 4.0 - 15.0 % Final    Eosinophil% 07/02/2020 3.2  0.0 - 8.0 % Final    Basophil% 07/02/2020 1.2  0.0 - 1.9 % Final    Differential Method 07/02/2020 Automated   Final    Sodium 07/02/2020 141  136 - 145 mmol/L Final    Potassium 07/02/2020 3.6  3.5 - 5.1 mmol/L Final    Chloride 07/02/2020 109  95 - 110 mmol/L Final    CO2 07/02/2020 27  22 - 31 mmol/L Final    Glucose 07/02/2020 85  70 - 110 mg/dL Final    Comment: The ADA recommends the following guidelines for fasting glucose:  Normal:       less than 100 mg/dL  Prediabetes:  100 mg/dL to 125 mg/dL  Diabetes:     126 mg/dL or higher      BUN, Bld 07/02/2020 13  7 - 18 mg/dL Final    Creatinine 07/02/2020 0.79  0.50 - 1.40 mg/dL Final    Calcium 07/02/2020 10.7* 8.4 - 10.2 mg/dL Final    Total Protein 07/02/2020 7.1  6.0 - 8.4 g/dL Final    Albumin 07/02/2020 4.1  3.5 - 5.2 g/dL Final    Total Bilirubin 07/02/2020 0.3  0.2 - 1.3 mg/dL Final    Alkaline Phosphatase 07/02/2020 140  38 - 145 U/L Final    AST 07/02/2020 54* 14 - 36 U/L Final    ALT 07/02/2020 53* 0 - 35 U/L Final    Anion Gap 07/02/2020 5* 8 - 16 mmol/L Final    eGFR if African American 07/02/2020 >60  >60 mL/min/1.73 m^2 Final    eGFR if non African American 07/02/2020 >60  >60 mL/min/1.73 m^2 Final    Comment: Calculation used to obtain the estimated glomerular filtration  rate (eGFR) is the CKD-EPI  equation.      Lab Visit on 06/29/2020   Component Date Value Ref Range Status    WBC 06/29/2020 4.85  3.90 - 12.70 K/uL Final    RBC 06/29/2020 4.64  4.00 - 5.40 M/uL Final    Hemoglobin 06/29/2020 10.8* 12.0 - 16.0 g/dL Final    Hematocrit 06/29/2020 36.3* 37.0 - 48.5 % Final    Mean Corpuscular Volume 06/29/2020 78* 82 - 98 fL Final    Mean Corpuscular Hemoglobin 06/29/2020 23.3* 27.0 - 31.0 pg Final    Mean Corpuscular Hemoglobin Conc 06/29/2020 29.8* 32.0 - 36.0 g/dL Final    RDW 06/29/2020 18.8* 11.5 - 14.5 % Final    Platelets 06/29/2020 241  150 - 350 K/uL Final    MPV 06/29/2020 9.6  9.2 - 12.9 fL Final    Immature Granulocytes 06/29/2020 0.2  0.0 - 0.5 % Final    Gran # (ANC) 06/29/2020 2.1  1.8 - 7.7 K/uL Final    Immature Grans (Abs) 06/29/2020 0.01  0.00 - 0.04 K/uL Final    Comment: Mild elevation in immature granulocytes is non specific and   can be seen in a variety of conditions including stress response,   acute inflammation, trauma and pregnancy. Correlation with other   laboratory and clinical findings is essential.      Lymph # 06/29/2020 1.5  1.0 - 4.8 K/uL Final    Mono # 06/29/2020 0.8  0.3 - 1.0 K/uL Final    Eos # 06/29/2020 0.4  0.0 - 0.5 K/uL Final    Baso # 06/29/2020 0.04  0.00 - 0.20 K/uL Final    nRBC 06/29/2020 0  0 /100 WBC Final    Gran% 06/29/2020 43.5  38.0 - 73.0 % Final    Lymph% 06/29/2020 31.1  18.0 - 48.0 % Final    Mono% 06/29/2020 15.7* 4.0 - 15.0 % Final    Eosinophil% 06/29/2020 8.7* 0.0 - 8.0 % Final    Basophil% 06/29/2020 0.8  0.0 - 1.9 % Final    Differential Method 06/29/2020 Automated   Final    CEA 06/29/2020 1.2  0.0 - 5.0 ng/mL Final    Comment: [employer] immunometric chemiluminescent   methodology is used. Results obtained with different assay   methods cannot be used interchangeably.    Elevated concentrations of CEA may be found in smokers or in   patients with nonmalignant conditions. CEA concentrations,   regardless  of level, should not be interpreted as absolute   evidence of the presence or absence of malignant disease.   The [employer] CEA assay is not recommended as a screening procedure   for cancer detection.   Patients taking very high Biotin doses of >300 mcg/day may   cause a negative bias in this assay.       Sodium 06/29/2020 138  136 - 145 mmol/L Final    Potassium 06/29/2020 3.9  3.5 - 5.1 mmol/L Final    Chloride 06/29/2020 108  95 - 110 mmol/L Final    CO2 06/29/2020 25  22 - 31 mmol/L Final    Glucose 06/29/2020 102  70 - 110 mg/dL Final    Comment: The ADA recommends the following guidelines for fasting glucose:  Normal:       less than 100 mg/dL  Prediabetes:  100 mg/dL to 125 mg/dL  Diabetes:     126 mg/dL or higher      BUN, Bld 06/29/2020 15  7 - 18 mg/dL Final    Creatinine 06/29/2020 0.72  0.50 - 1.40 mg/dL Final    Calcium 06/29/2020 10.7* 8.4 - 10.2 mg/dL Final    Total Protein 06/29/2020 7.1  6.0 - 8.4 g/dL Final    Albumin 06/29/2020 4.0  3.5 - 5.2 g/dL Final    Total Bilirubin 06/29/2020 0.2  0.2 - 1.3 mg/dL Final    Alkaline Phosphatase 06/29/2020 142  38 - 145 U/L Final    AST 06/29/2020 46* 14 - 36 U/L Final    ALT 06/29/2020 38* 0 - 35 U/L Final    Anion Gap 06/29/2020 5* 8 - 16 mmol/L Final    eGFR if African American 06/29/2020 >60  >60 mL/min/1.73 m^2 Final    eGFR if non African American 06/29/2020 >60  >60 mL/min/1.73 m^2 Final    Comment: Calculation used to obtain the estimated glomerular filtration  rate (eGFR) is the CKD-EPI equation.          Assessment:     1. Malignant neoplasm of sigmoid colon    2. 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 Psychophysiological insomnia    4. Anxiety    5. Other constipation    6. Nausea    7.     Chemotherapy adverse reaction  Plan:   Plan: COVID-19 test pending    1,2,6.  Stage III colon cancer:   she did not complete cycle 5.  Secondary to the reaction.  I did talk to her about initiating single agent fluorouracil for the remainder of  her cycles as I am very hesitant to restart the oxaliplatin.  After thorough discussion we are going to proceed with a CT scan to be sure she does not have active disease given the increase in her liver enzymes and I will see her back in 1 week.  If this cancer normal I would like to initiate single agent fluorouracil therapy for the remainder of her cycles.  We did discuss the risk versus benefits of this and she does wish to proceed.     3,4.  Anxiety and insomnia:   She has been on BuSpar the past.  She has also met with our psychologist.  Overall she is tolerating this well and we will continue to monitor closely.  I have encouraged her to follow up with our psychologist as scheduled.  She states that she is also on Wellbutrin.     5.  Constipation: She will continue to take a stool softener daily and continue the MiraLax as needed.  She is aware to contact us with any problems or worsening symptoms will certainly see her sooner.      Pt displayed understanding of the above encounter and treatment plan. All thoughtful questions were answered to their satisfaction. Pte was advised to notify the care team or proceed to the ER if signs and symptoms worsen.

## 2020-07-07 ENCOUNTER — HOSPITAL ENCOUNTER (OUTPATIENT)
Dept: RADIOLOGY | Facility: HOSPITAL | Age: 50
Discharge: HOME | End: 2020-07-07
Attending: INTERNAL MEDICINE
Payer: COMMERCIAL

## 2020-07-07 DIAGNOSIS — C18.7 MALIGNANT NEOPLASM OF SIGMOID COLON: ICD-10-CM

## 2020-07-07 PROCEDURE — 74177 CT ABD & PELVIS W/CONTRAST: CPT | Mod: 26,,, | Performed by: RADIOLOGY

## 2020-07-07 PROCEDURE — 74177 CT CHEST ABDOMEN PELVIS WITH CONTRAST (XPD): ICD-10-PCS | Mod: 26,,, | Performed by: RADIOLOGY

## 2020-07-07 PROCEDURE — 71260 CT THORAX DX C+: CPT | Mod: 26,,, | Performed by: RADIOLOGY

## 2020-07-07 PROCEDURE — 25500020 PHARM REV CODE 255: Mod: PO | Performed by: INTERNAL MEDICINE

## 2020-07-07 PROCEDURE — 71260 CT CHEST ABDOMEN PELVIS WITH CONTRAST (XPD): ICD-10-PCS | Mod: 26,,, | Performed by: RADIOLOGY

## 2020-07-07 PROCEDURE — 74177 CT ABD & PELVIS W/CONTRAST: CPT | Mod: TC,PO

## 2020-07-07 RX ADMIN — IOHEXOL 100 ML: 350 INJECTION, SOLUTION INTRAVENOUS at 09:07

## 2020-07-07 RX ADMIN — IOHEXOL 1000 ML: 12 SOLUTION ORAL at 09:07

## 2020-07-09 ENCOUNTER — OFFICE VISIT (OUTPATIENT)
Dept: HEMATOLOGY/ONCOLOGY | Facility: CLINIC | Age: 50
End: 2020-07-09
Payer: COMMERCIAL

## 2020-07-09 ENCOUNTER — DOCUMENTATION ONLY (OUTPATIENT)
Dept: INFUSION THERAPY | Facility: HOSPITAL | Age: 50
End: 2020-07-09

## 2020-07-09 VITALS
TEMPERATURE: 98 F | DIASTOLIC BLOOD PRESSURE: 85 MMHG | OXYGEN SATURATION: 98 % | HEART RATE: 100 BPM | BODY MASS INDEX: 42.59 KG/M2 | SYSTOLIC BLOOD PRESSURE: 133 MMHG | WEIGHT: 263.88 LBS | RESPIRATION RATE: 20 BRPM

## 2020-07-09 DIAGNOSIS — C18.7 MALIGNANT NEOPLASM OF SIGMOID COLON: Primary | ICD-10-CM

## 2020-07-09 DIAGNOSIS — C77.2 METASTASIS TO INTESTINAL LYMPH NODE: ICD-10-CM

## 2020-07-09 PROCEDURE — 3079F PR MOST RECENT DIASTOLIC BLOOD PRESSURE 80-89 MM HG: ICD-10-PCS | Mod: CPTII,S$GLB,, | Performed by: INTERNAL MEDICINE

## 2020-07-09 PROCEDURE — 99999 PR PBB SHADOW E&M-EST. PATIENT-LVL IV: ICD-10-PCS | Mod: PBBFAC,,, | Performed by: INTERNAL MEDICINE

## 2020-07-09 PROCEDURE — 3075F SYST BP GE 130 - 139MM HG: CPT | Mod: CPTII,S$GLB,, | Performed by: INTERNAL MEDICINE

## 2020-07-09 PROCEDURE — 99215 OFFICE O/P EST HI 40 MIN: CPT | Mod: S$GLB,,, | Performed by: INTERNAL MEDICINE

## 2020-07-09 PROCEDURE — 3008F BODY MASS INDEX DOCD: CPT | Mod: CPTII,S$GLB,, | Performed by: INTERNAL MEDICINE

## 2020-07-09 PROCEDURE — 99999 PR PBB SHADOW E&M-EST. PATIENT-LVL IV: CPT | Mod: PBBFAC,,, | Performed by: INTERNAL MEDICINE

## 2020-07-09 PROCEDURE — 3079F DIAST BP 80-89 MM HG: CPT | Mod: CPTII,S$GLB,, | Performed by: INTERNAL MEDICINE

## 2020-07-09 PROCEDURE — 3075F PR MOST RECENT SYSTOLIC BLOOD PRESS GE 130-139MM HG: ICD-10-PCS | Mod: CPTII,S$GLB,, | Performed by: INTERNAL MEDICINE

## 2020-07-09 PROCEDURE — 3008F PR BODY MASS INDEX (BMI) DOCUMENTED: ICD-10-PCS | Mod: CPTII,S$GLB,, | Performed by: INTERNAL MEDICINE

## 2020-07-09 PROCEDURE — 99215 PR OFFICE/OUTPT VISIT, EST, LEVL V, 40-54 MIN: ICD-10-PCS | Mod: S$GLB,,, | Performed by: INTERNAL MEDICINE

## 2020-07-09 RX ORDER — HEPARIN 100 UNIT/ML
500 SYRINGE INTRAVENOUS
Status: CANCELLED | OUTPATIENT
Start: 2020-07-22

## 2020-07-09 RX ORDER — SODIUM CHLORIDE 0.9 % (FLUSH) 0.9 %
10 SYRINGE (ML) INJECTION
Status: CANCELLED | OUTPATIENT
Start: 2020-07-22

## 2020-07-09 RX ORDER — DIPHENHYDRAMINE HYDROCHLORIDE 50 MG/ML
25 INJECTION INTRAMUSCULAR; INTRAVENOUS
Status: CANCELLED
Start: 2020-07-20

## 2020-07-09 RX ORDER — SODIUM CHLORIDE 0.9 % (FLUSH) 0.9 %
10 SYRINGE (ML) INJECTION
Status: CANCELLED | OUTPATIENT
Start: 2020-07-20

## 2020-07-09 RX ORDER — FLUOROURACIL 50 MG/ML
400 INJECTION, SOLUTION INTRAVENOUS
Status: CANCELLED | OUTPATIENT
Start: 2020-07-20

## 2020-07-09 RX ORDER — EPINEPHRINE 0.3 MG/.3ML
0.3 INJECTION SUBCUTANEOUS ONCE AS NEEDED
Status: CANCELLED | OUTPATIENT
Start: 2020-07-20

## 2020-07-09 RX ORDER — DIPHENHYDRAMINE HYDROCHLORIDE 50 MG/ML
50 INJECTION INTRAMUSCULAR; INTRAVENOUS ONCE AS NEEDED
Status: CANCELLED | OUTPATIENT
Start: 2020-07-20

## 2020-07-09 RX ORDER — HEPARIN 100 UNIT/ML
500 SYRINGE INTRAVENOUS
Status: CANCELLED | OUTPATIENT
Start: 2020-07-20

## 2020-07-09 NOTE — PROGRESS NOTES
[11:03 AM] Nery Dove wants to push [id number]'s treatment to 7/20.      ?[11:22 AM] Connie Stapleton      mrn [id number] 07/20/20 @1:30

## 2020-07-09 NOTE — PROGRESS NOTES
Subjective:       Patient ID: Gail Mckinnon is a 50 y.o. female.    Chief Complaint: Colon cancer      History:  Past Medical History:   Diagnosis Date    Anxiety     Depression     FH: ovarian cancer 3/16/2020    Hx of psychiatric care     Effexor, Paxil, Lexapro, Zoloft, Wellbutrin, Trazodone Buspar    Hyperthyroidism     Hypothyroid     Kidney calculi     Malignant neoplasm of sigmoid colon 3/16/2020    Menorrhagia     Multinodular goiter 2012    Palpitation     Psychiatric problem     Venous insufficiency      Past Surgical History:   Procedure Laterality Date     SECTION, CLASSIC      x3    COLONOSCOPY N/A 2020    Procedure: COLONOSCOPY;  Surgeon: Shane Parker MD;  Location: [facility];  Service: Endoscopy;  Laterality: N/A;    CYSTOSCOPY W/ URETERAL STENT PLACEMENT Bilateral 3/25/2020    Procedure: CYSTOSCOPY, WITH URETERAL STENT INSERTION;  Surgeon: Claudio Tyson MD;  Location: [facility] OR [address] [place] [address];  Service: Urology;  Laterality: Bilateral;    INSERTION OF TUNNELED CENTRAL VENOUS CATHETER (CVC) WITH SUBCUTANEOUS PORT N/A 2020    Procedure: [id number]-A-[id number];  Surgeon: [facility];  Location: [facility] OR [address] [place] [address];  Service: Radiology;  Laterality: N/A;  Room 189/Cindy    LAPAROSCOPIC SIGMOIDECTOMY N/A 3/25/2020    Procedure: COLECTOMY, SIGMOID, LAPAROSCOPIC, flex sig, ERAS high;  Surgeon: Silvio Man MD;  Location: [facility] OR [address] [place] [address];  Service: Colon and Rectal;  Laterality: N/A;    MOBILIZATION OF SPLENIC FLEXURE  3/25/2020    Procedure: MOBILIZATION, SPLENIC FLEXURE;  Surgeon: Silvio Man MD;  Location: [facility] OR [address] [place] [address];  Service: Colon and Rectal;;    tonsillectomy      TOTAL ABDOMINAL HYSTERECTOMY W/ BILATERAL SALPINGOOPHORECTOMY N/A 3/25/2020    Procedure: HYSTERECTOMY, TOTAL, ABDOMINAL, WITH BILATERAL SALPINGO-OOPHORECTOMY;  Surgeon: Keron Brady MD;  Location: [facility] OR [address] [place] [address];  Service: Oncology;  Laterality: N/A;       Oncology History   Malignant neoplasm of sigmoid colon   3/16/2020 Initial Diagnosis    Malignant neoplasm of sigmoid colon     3/31/2020 Cancer Staged    Staging form: Colon and Rectum, AJCC 8th Edition  - Clinical stage from 3/31/2020: Stage IIIC (cT4b, cN2a, cM0)     5/6/2020 -  Chemotherapy    Treatment Summary   Plan Name: OP FOLFOX 6 Q2W  Treatment Goal: Curative  Status: Active  Start Date: 5/6/2020  End Date: 10/8/2020 (Planned)  Provider: MAXWELL Dove MD  Chemotherapy: fluorouraciL injection 945 mg, 400 mg/m2 = 945 mg, Intravenous, Clinic/HOD 1 time, 5 of 12 cycles  Administration: 945 mg (5/6/2020), 945 mg (5/20/2020), 945 mg (6/3/2020), 945 mg (6/17/2020)  fluorouraciL 2,400 mg/m2 = 5,665 mg in sodium chloride 0.9% 240 mL chemo infusion, 2,400 mg/m2 = 5,665 mg, Intravenous, Over 46 hours, 5 of 12 cycles  Administration: 5,665 mg (5/6/2020), 5,665 mg (5/20/2020), 5,665 mg (6/3/2020), 5,665 mg (6/17/2020)  leucovorin calcium 900 mg in dextrose 5 % 250 mL infusion, 945 mg, Intravenous, Clinic/HOD 1 time, 5 of 12 cycles  Administration: 900 mg (5/6/2020), 900 mg (5/20/2020), 900 mg (6/3/2020), 900 mg (6/17/2020), 945 mg (6/30/2020)  oxaliplatin (ELOXATIN) 200 mg in dextrose 5 % 500 mL chemo infusion, 201 mg, Intravenous, Clinic/HOD 1 time, 5 of 12 cycles  Administration: 200 mg (5/6/2020), 200 mg (5/20/2020), 200 mg (6/3/2020), 200 mg (6/17/2020), 201 mg (6/30/2020)     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   4/3/2020 Initial Diagnosis    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     5/6/2020 -  Chemotherapy    Treatment Summary   Plan Name: OP FOLFOX 6 Q2W  Treatment Goal: Curative  Status: Active  Start Date: 5/6/2020  End Date: 10/9/2020 (Planned)  Provider: MAXWELL Dove MD  Chemotherapy: fluorouraciL injection 945 mg, 400 mg/m2 = 945 mg, Intravenous, Clinic/HOD 1 time, 3 of 12 cycles  Administration: 945 mg (5/6/2020), 945 mg (5/20/2020), 945 mg (6/3/2020)  fluorouraciL 2,400 mg/m2 = 5,665  mg in sodium chloride 0.9% 240 mL chemo infusion, 2,400 mg/m2 = 5,665 mg, Intravenous, Over 46 hours, 3 of 12 cycles  Administration: 5,665 mg (5/6/2020), 5,665 mg (5/20/2020), 5,665 mg (6/3/2020)  leucovorin calcium 900 mg in dextrose 5 % 250 mL infusion, 945 mg, Intravenous, Clinic/HOD 1 time, 3 of 12 cycles  Administration: 900 mg (5/6/2020), 900 mg (5/20/2020), 900 mg (6/3/2020)  oxaliplatin (ELOXATIN) 200 mg in dextrose 5 % 500 mL chemo infusion, 201 mg, Intravenous, Clinic/HOD 1 time, 3 of 12 cycles  Administration: 200 mg (5/6/2020), 200 mg (5/20/2020), 200 mg (6/3/2020)          Allergies:  Review of patient's allergies indicates:   Allergen Reactions    Penicillin g      unknown    Penicillins Hives     childhood allergy        Follow-up  Pertinent negatives include no abdominal pain, chest pain, chills, congestion, coughing, fatigue, fever, headaches, joint swelling, nausea, neck pain, numbness, rash, sore throat, vomiting or weakness.    50-year-old female who began having abdominal discomfort in June of 2019.  She was diagnosed with kidney stones and a CT scan showed some thickening in her colon.  She was then sent to see her PCP who recommended a colonoscopy.  The patient put this off until around January of 2020 when her PCP was adamant that she undergo further workup and she underwent colonoscopy in February of 2020.  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  The scans did not show obvious evidence of metastatic disease.  There was a small lesion in the liver that was concerning for a cyst in this will need to be followed closely.  She has healed from her surgery and she did have 37 lymph nodes removed of which 4 positive for cancer.     She has essentially been through 5 cycle chemotherapy however  we did have to stop her 5th cycle secondary to an oxaliplatin reaction.  Overall today she feels well.  She did have recent CT scan that does show some lymph nodes but no obvious evidence of metastatic or recurrent disease.  Her CEA remains normal.  She denies abdominal discomfort, melena, hematochezia red blood per rectum.  Her weight is stable.  She denies any worsening neuropathy.  She denies shortness of breath or chest pain.    ECO  ECOG SCORE            Review of Systems   Constitutional: Negative for appetite change, chills, fatigue, fever and unexpected weight change.   HENT: Negative for congestion, facial swelling, mouth sores, sinus pressure, sinus pain, sore throat and trouble swallowing.    Eyes: Negative for photophobia, pain and visual disturbance.   Respiratory: Negative for cough, chest tightness, shortness of breath, wheezing and stridor.    Cardiovascular: Negative for chest pain and leg swelling.   Gastrointestinal: Negative for abdominal distention, abdominal pain, blood in stool, constipation, diarrhea, nausea, rectal pain and vomiting.   Genitourinary: Negative for dysuria, flank pain and urgency.   Musculoskeletal: Negative for back pain, gait problem, joint swelling and neck pain.   Skin: Negative for color change and rash.   Neurological: Negative for dizziness, syncope, weakness, light-headedness, numbness and headaches.   Hematological: Negative for adenopathy. Does not bruise/bleed easily.   Psychiatric/Behavioral: Negative for agitation, behavioral problems and confusion. The patient is not nervous/anxious.        Objective:      Physical Exam  Vitals signs reviewed.   Constitutional:       General: She is not in acute distress.     Appearance: She is well-developed. She is not diaphoretic.   HENT:      Head: Normocephalic.      Mouth/Throat:      Mouth: Mucous membranes are moist.   Eyes:      General: No scleral icterus.     Extraocular Movements: Extraocular movements intact.       Pupils: Pupils are equal, round, and reactive to light.   Neck:      Musculoskeletal: Normal range of motion and neck supple. No neck rigidity.      Trachea: No tracheal deviation.   Cardiovascular:      Rate and Rhythm: Normal rate.   Pulmonary:      Effort: Pulmonary effort is normal. No respiratory distress.      Breath sounds: Normal breath sounds. No stridor. No wheezing.   Abdominal:      General: Bowel sounds are normal. There is no distension.      Palpations: Abdomen is soft. There is no mass.      Tenderness: There is no abdominal tenderness.      Comments: Protuberant   Musculoskeletal:         General: No deformity.   Skin:     Findings: No rash.   Neurological:      General: No focal deficit present.      Mental Status: She is alert and oriented to person, place, and time.      Cranial Nerves: No cranial nerve deficit.      Sensory: No sensory deficit.   Psychiatric:         Behavior: Behavior normal.         Thought Content: Thought content normal.         Judgment: Judgment normal.         Vitals:    07/09/20 0925   BP: 133/85   Pulse: 100   Resp: 20   Temp: 97.8 °F (36.6 °C)         Results:     No visits with results within 1 Week(s) from this visit.   Latest known visit with results is:   Lab Visit on 07/02/2020   Component Date Value Ref Range Status    WBC 07/02/2020 5.05  3.90 - 12.70 K/uL Final    RBC 07/02/2020 4.74  4.00 - 5.40 M/uL Final    Hemoglobin 07/02/2020 11.0* 12.0 - 16.0 g/dL Final    Hematocrit 07/02/2020 37.4  37.0 - 48.5 % Final    Mean Corpuscular Volume 07/02/2020 79* 82 - 98 fL Final    Mean Corpuscular Hemoglobin 07/02/2020 23.2* 27.0 - 31.0 pg Final    Mean Corpuscular Hemoglobin Conc 07/02/2020 29.4* 32.0 - 36.0 g/dL Final    RDW 07/02/2020 19.5* 11.5 - 14.5 % Final    Platelets 07/02/2020 265  150 - 350 K/uL Final    MPV 07/02/2020 9.8  9.2 - 12.9 fL Final    Immature Granulocytes 07/02/2020 0.2  0.0 - 0.5 % Final    Gran # (ANC) 07/02/2020 2.7  1.8 - 7.7 K/uL  Final    Immature Grans (Abs) 07/02/2020 0.01  0.00 - 0.04 K/uL Final    Comment: Mild elevation in immature granulocytes is non specific and   can be seen in a variety of conditions including stress response,   acute inflammation, trauma and pregnancy. Correlation with other   laboratory and clinical findings is essential.      Lymph # 07/02/2020 1.4  1.0 - 4.8 K/uL Final    Mono # 07/02/2020 0.8  0.3 - 1.0 K/uL Final    Eos # 07/02/2020 0.2  0.0 - 0.5 K/uL Final    Baso # 07/02/2020 0.06  0.00 - 0.20 K/uL Final    nRBC 07/02/2020 0  0 /100 WBC Final    Gran% 07/02/2020 52.5  38.0 - 73.0 % Final    Lymph% 07/02/2020 27.3  18.0 - 48.0 % Final    Mono% 07/02/2020 15.6* 4.0 - 15.0 % Final    Eosinophil% 07/02/2020 3.2  0.0 - 8.0 % Final    Basophil% 07/02/2020 1.2  0.0 - 1.9 % Final    Differential Method 07/02/2020 Automated   Final    Sodium 07/02/2020 141  136 - 145 mmol/L Final    Potassium 07/02/2020 3.6  3.5 - 5.1 mmol/L Final    Chloride 07/02/2020 109  95 - 110 mmol/L Final    CO2 07/02/2020 27  22 - 31 mmol/L Final    Glucose 07/02/2020 85  70 - 110 mg/dL Final    Comment: The ADA recommends the following guidelines for fasting glucose:  Normal:       less than 100 mg/dL  Prediabetes:  100 mg/dL to 125 mg/dL  Diabetes:     126 mg/dL or higher      BUN, Bld 07/02/2020 13  7 - 18 mg/dL Final    Creatinine 07/02/2020 0.79  0.50 - 1.40 mg/dL Final    Calcium 07/02/2020 10.7* 8.4 - 10.2 mg/dL Final    Total Protein 07/02/2020 7.1  6.0 - 8.4 g/dL Final    Albumin 07/02/2020 4.1  3.5 - 5.2 g/dL Final    Total Bilirubin 07/02/2020 0.3  0.2 - 1.3 mg/dL Final    Alkaline Phosphatase 07/02/2020 140  38 - 145 U/L Final    AST 07/02/2020 54* 14 - 36 U/L Final    ALT 07/02/2020 53* 0 - 35 U/L Final    Anion Gap 07/02/2020 5* 8 - 16 mmol/L Final    eGFR if African American 07/02/2020 >60  >60 mL/min/1.73 m^2 Final    eGFR if non African American 07/02/2020 >60  >60 mL/min/1.73 m^2 Final     Comment: Calculation used to obtain the estimated glomerular filtration  rate (eGFR) is the CKD-EPI equation.          Assessment:     1. Malignant neoplasm of sigmoid colon    2. 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 Psychophysiological insomnia    4. Anxiety    5. Other constipation    6. Nausea    7.     Chemotherapy adverse reaction  Plan:   Plan: COVID-19 test pending    1,2,6.  Stage III colon cancer:   She did not complete cycle 5 in total.  I did have a long conversation with the patient about chemotherapy and I did review her imaging studies with her today.  There is no obvious evidence of metastatic disease or recurrent disease.  Her CEA remains normal.  After thorough discussion we are going to re-attempt the FOLFOX in approximately 1 week however I am going to add IV Benadryl and a higher dose of steroid and she is aware of possible side effects and states she will notify the nurses immediately should she develop any shortness of breath or swelling.  Her previous reaction started with her hands itching and we did discuss this at length that if any of that begins to occur the medication must be stopped immediately and I would not re-attempt the oxaliplatin at that point.  We did talk about doing just 4 years still maintenance however she would like to at least attempt the FOLFOX regimen 1 more time.     3,4.  Anxiety and insomnia:   She has been on BuSpar the past.  She has also met with our psychologist.  Overall she is tolerating this well and we will continue to monitor closely.  I have encouraged her to follow up with our psychologist as scheduled.  She states that she is also on Wellbutrin.     5.  Constipation: She will continue to take a stool softener daily and continue the MiraLax as needed.  She is aware to contact us with any problems or worsening symptoms will certainly see her sooner.      Pt displayed understanding of the above encounter and treatment plan. All thoughtful questions were  answered to their satisfaction. Pte was advised to notify the care team or proceed to the ER if signs and symptoms worsen.

## 2020-07-14 ENCOUNTER — TELEPHONE (OUTPATIENT)
Dept: PSYCHIATRY | Facility: CLINIC | Age: 50
End: 2020-07-14

## 2020-07-14 NOTE — TELEPHONE ENCOUNTER
Spoke with patient by phone due to lack of check in at video appt time. Patient reports got engrossed in work and forgot appt. Rescheduled for 7/21/20.  NARENDRA Gibbs

## 2020-07-20 ENCOUNTER — DOCUMENTATION ONLY (OUTPATIENT)
Dept: INFUSION THERAPY | Facility: HOSPITAL | Age: 50
End: 2020-07-20

## 2020-07-20 ENCOUNTER — INFUSION (OUTPATIENT)
Dept: INFUSION THERAPY | Facility: HOSPITAL | Age: 50
End: 2020-07-20
Attending: INTERNAL MEDICINE
Payer: COMMERCIAL

## 2020-07-20 ENCOUNTER — OFFICE VISIT (OUTPATIENT)
Dept: HEMATOLOGY/ONCOLOGY | Facility: CLINIC | Age: 50
End: 2020-07-20
Payer: COMMERCIAL

## 2020-07-20 VITALS
OXYGEN SATURATION: 99 % | HEART RATE: 92 BPM | SYSTOLIC BLOOD PRESSURE: 134 MMHG | HEIGHT: 66 IN | WEIGHT: 267 LBS | TEMPERATURE: 97 F | BODY MASS INDEX: 42.91 KG/M2 | DIASTOLIC BLOOD PRESSURE: 82 MMHG | RESPIRATION RATE: 18 BRPM

## 2020-07-20 VITALS
HEIGHT: 66 IN | BODY MASS INDEX: 42.91 KG/M2 | OXYGEN SATURATION: 99 % | HEART RATE: 91 BPM | WEIGHT: 267 LBS | RESPIRATION RATE: 18 BRPM | DIASTOLIC BLOOD PRESSURE: 90 MMHG | SYSTOLIC BLOOD PRESSURE: 141 MMHG | TEMPERATURE: 97 F

## 2020-07-20 DIAGNOSIS — C18.7 MALIGNANT NEOPLASM OF SIGMOID COLON: Primary | ICD-10-CM

## 2020-07-20 DIAGNOSIS — C77.2 METASTASIS TO INTESTINAL LYMPH NODE: Primary | ICD-10-CM

## 2020-07-20 DIAGNOSIS — C18.7 MALIGNANT NEOPLASM OF SIGMOID COLON: ICD-10-CM

## 2020-07-20 DIAGNOSIS — C77.2 METASTASIS TO INTESTINAL LYMPH NODE: ICD-10-CM

## 2020-07-20 PROCEDURE — 96376 TX/PRO/DX INJ SAME DRUG ADON: CPT | Mod: PN

## 2020-07-20 PROCEDURE — 99214 PR OFFICE/OUTPT VISIT, EST, LEVL IV, 30-39 MIN: ICD-10-PCS | Mod: S$GLB,,, | Performed by: INTERNAL MEDICINE

## 2020-07-20 PROCEDURE — 96413 CHEMO IV INFUSION 1 HR: CPT | Mod: PN

## 2020-07-20 PROCEDURE — 99999 PR PBB SHADOW E&M-EST. PATIENT-LVL IV: CPT | Mod: PBBFAC,,, | Performed by: INTERNAL MEDICINE

## 2020-07-20 PROCEDURE — 96375 TX/PRO/DX INJ NEW DRUG ADDON: CPT | Mod: PN

## 2020-07-20 PROCEDURE — S0028 INJECTION, FAMOTIDINE, 20 MG: HCPCS | Mod: PN | Performed by: INTERNAL MEDICINE

## 2020-07-20 PROCEDURE — 96367 TX/PROPH/DG ADDL SEQ IV INF: CPT | Mod: PN

## 2020-07-20 PROCEDURE — 3077F PR MOST RECENT SYSTOLIC BLOOD PRESSURE >= 140 MM HG: ICD-10-PCS | Mod: CPTII,S$GLB,, | Performed by: INTERNAL MEDICINE

## 2020-07-20 PROCEDURE — 3080F DIAST BP >= 90 MM HG: CPT | Mod: CPTII,S$GLB,, | Performed by: INTERNAL MEDICINE

## 2020-07-20 PROCEDURE — 3077F SYST BP >= 140 MM HG: CPT | Mod: CPTII,S$GLB,, | Performed by: INTERNAL MEDICINE

## 2020-07-20 PROCEDURE — 3008F PR BODY MASS INDEX (BMI) DOCUMENTED: ICD-10-PCS | Mod: CPTII,S$GLB,, | Performed by: INTERNAL MEDICINE

## 2020-07-20 PROCEDURE — 3080F PR MOST RECENT DIASTOLIC BLOOD PRESSURE >= 90 MM HG: ICD-10-PCS | Mod: CPTII,S$GLB,, | Performed by: INTERNAL MEDICINE

## 2020-07-20 PROCEDURE — 99214 OFFICE O/P EST MOD 30 MIN: CPT | Mod: S$GLB,,, | Performed by: INTERNAL MEDICINE

## 2020-07-20 PROCEDURE — 25000003 PHARM REV CODE 250: Mod: PN | Performed by: INTERNAL MEDICINE

## 2020-07-20 PROCEDURE — 63600175 PHARM REV CODE 636 W HCPCS: Mod: TB,PN | Performed by: INTERNAL MEDICINE

## 2020-07-20 PROCEDURE — 3008F BODY MASS INDEX DOCD: CPT | Mod: CPTII,S$GLB,, | Performed by: INTERNAL MEDICINE

## 2020-07-20 PROCEDURE — 96368 THER/DIAG CONCURRENT INF: CPT | Mod: PN

## 2020-07-20 PROCEDURE — 99999 PR PBB SHADOW E&M-EST. PATIENT-LVL IV: ICD-10-PCS | Mod: PBBFAC,,, | Performed by: INTERNAL MEDICINE

## 2020-07-20 RX ORDER — DIPHENHYDRAMINE HYDROCHLORIDE 50 MG/ML
25 INJECTION INTRAMUSCULAR; INTRAVENOUS
Status: COMPLETED | OUTPATIENT
Start: 2020-07-20 | End: 2020-07-20

## 2020-07-20 RX ORDER — DIPHENHYDRAMINE HYDROCHLORIDE 50 MG/ML
50 INJECTION INTRAMUSCULAR; INTRAVENOUS ONCE AS NEEDED
Status: COMPLETED | OUTPATIENT
Start: 2020-07-20 | End: 2020-07-20

## 2020-07-20 RX ORDER — DIPHENHYDRAMINE HYDROCHLORIDE 50 MG/ML
25 INJECTION INTRAMUSCULAR; INTRAVENOUS
Status: DISCONTINUED | OUTPATIENT
Start: 2020-07-20 | End: 2020-07-20 | Stop reason: HOSPADM

## 2020-07-20 RX ORDER — EPINEPHRINE 0.3 MG/.3ML
0.3 INJECTION SUBCUTANEOUS ONCE AS NEEDED
Status: DISCONTINUED | OUTPATIENT
Start: 2020-07-20 | End: 2020-07-20 | Stop reason: HOSPADM

## 2020-07-20 RX ORDER — SODIUM CHLORIDE 0.9 % (FLUSH) 0.9 %
10 SYRINGE (ML) INJECTION
Status: DISCONTINUED | OUTPATIENT
Start: 2020-07-20 | End: 2020-07-20 | Stop reason: HOSPADM

## 2020-07-20 RX ORDER — HEPARIN 100 UNIT/ML
500 SYRINGE INTRAVENOUS
Status: DISCONTINUED | OUTPATIENT
Start: 2020-07-20 | End: 2020-07-20 | Stop reason: HOSPADM

## 2020-07-20 RX ORDER — FLUOROURACIL 50 MG/ML
400 INJECTION, SOLUTION INTRAVENOUS
Status: DISCONTINUED | OUTPATIENT
Start: 2020-07-20 | End: 2020-07-20 | Stop reason: HOSPADM

## 2020-07-20 RX ORDER — METHYLPREDNISOLONE SOD SUCC 125 MG
125 VIAL (EA) INJECTION
Status: COMPLETED | OUTPATIENT
Start: 2020-07-20 | End: 2020-07-20

## 2020-07-20 RX ORDER — FAMOTIDINE 10 MG/ML
20 INJECTION INTRAVENOUS
Status: COMPLETED | OUTPATIENT
Start: 2020-07-20 | End: 2020-07-20

## 2020-07-20 RX ADMIN — DEXAMETHASONE SODIUM PHOSPHATE: 4 INJECTION, SOLUTION INTRA-ARTICULAR; INTRALESIONAL; INTRAMUSCULAR; INTRAVENOUS; SOFT TISSUE at 01:07

## 2020-07-20 RX ADMIN — SODIUM CHLORIDE: 9 INJECTION, SOLUTION INTRAVENOUS at 03:07

## 2020-07-20 RX ADMIN — DIPHENHYDRAMINE HYDROCHLORIDE 25 MG: 50 INJECTION INTRAMUSCULAR; INTRAVENOUS at 01:07

## 2020-07-20 RX ADMIN — HEPARIN 500 UNITS: 100 SYRINGE at 03:07

## 2020-07-20 RX ADMIN — OXALIPLATIN 150 MG: 5 INJECTION, SOLUTION, CONCENTRATE INTRAVENOUS at 02:07

## 2020-07-20 RX ADMIN — DIPHENHYDRAMINE HYDROCHLORIDE 50 MG: 50 INJECTION INTRAMUSCULAR; INTRAVENOUS at 03:07

## 2020-07-20 RX ADMIN — APREPITANT 130 MG: 130 INJECTION, EMULSION INTRAVENOUS at 02:07

## 2020-07-20 RX ADMIN — LEUCOVORIN CALCIUM 945 MG: 350 INJECTION, POWDER, LYOPHILIZED, FOR SOLUTION INTRAMUSCULAR; INTRAVENOUS at 02:07

## 2020-07-20 RX ADMIN — METHYLPREDNISOLONE SODIUM SUCCINATE 125 MG: 125 INJECTION, POWDER, FOR SOLUTION INTRAMUSCULAR; INTRAVENOUS at 03:07

## 2020-07-20 RX ADMIN — DEXTROSE MONOHYDRATE: 50 INJECTION, SOLUTION INTRAVENOUS at 01:07

## 2020-07-20 RX ADMIN — FAMOTIDINE 20 MG: 10 INJECTION INTRAVENOUS at 03:07

## 2020-07-20 NOTE — PLAN OF CARE
Problem: Adult Inpatient Plan of Care  Goal: Plan of Care Review  Outcome: Ongoing, Progressing  Flowsheets (Taken 7/20/2020 1448)  Plan of Care Reviewed With: patient  Goal: Patient-Specific Goal (Individualization)  Outcome: Ongoing, Progressing  Flowsheets (Taken 7/20/2020 1448)  Individualized Care Needs: warm blanket, recliner  Anxieties, Fears or Concerns: side effects     Problem: Fatigue (Oncology Care)  Goal: Improved Activity Tolerance  Outcome: Ongoing, Progressing  Intervention: Promote Energy Conservation  Flowsheets (Taken 7/20/2020 1448)  Fatigue Management: frequent rest breaks encouraged  Sleep/Rest Enhancement: relaxation techniques promoted  Activity Management: ambulated - L4

## 2020-07-20 NOTE — PROGRESS NOTES
Message  Received: 3 days ago  Message Contents   Adeline Sy, RN  P [facility] [employer] Staff             Per Dr. Johnson last note, adding benadryl and additional steroids to C6 Folfox due to rxn.  I see the benadryl that was added but no change in the steroid.  Please advise.          Message marked as complete 7/20/20 / per 7/20 note Dr. Dove dose reduced drug.

## 2020-07-20 NOTE — PLAN OF CARE
Pt to infusion center for C6 of FOLFOX. 20 min into oxaliplatin and leucovorin, pt become flush, c/o of itching to ears and hands. Meds stopped, IVF initiated, VS stable. MD notified and stated to flush pt and permanently stop chemo. 20 min after oxaliplatin stopped pt started c/o of tongue tightening and worsening itching, per Dr. Dove ok to give hypersensitivity kit. 30 min after kit given, pt felt all symptoms resolved and felt safe to be d/c'd home. All questions were answered, ambulated independently at d/c.

## 2020-07-20 NOTE — PROGRESS NOTES
Subjective:       Patient ID: Gail Mckinnon is a 50 y.o. female.    Chief Complaint: Colon cancer      History:  Past Medical History:   Diagnosis Date    Anxiety     Depression     FH: ovarian cancer 3/16/2020    Hx of psychiatric care     Effexor, Paxil, Lexapro, Zoloft, Wellbutrin, Trazodone Buspar    Hyperthyroidism     Hypothyroid     Kidney calculi     Malignant neoplasm of sigmoid colon 3/16/2020    Menorrhagia     Multinodular goiter 2012    Palpitation     Psychiatric problem     Venous insufficiency      Past Surgical History:   Procedure Laterality Date     SECTION, CLASSIC      x3    COLONOSCOPY N/A 2020    Procedure: COLONOSCOPY;  Surgeon: Shane Parker MD;  Location: [facility];  Service: Endoscopy;  Laterality: N/A;    CYSTOSCOPY W/ URETERAL STENT PLACEMENT Bilateral 3/25/2020    Procedure: CYSTOSCOPY, WITH URETERAL STENT INSERTION;  Surgeon: Claudio Tyson MD;  Location: [facility] OR [address] [place] [address];  Service: Urology;  Laterality: Bilateral;    INSERTION OF TUNNELED CENTRAL VENOUS CATHETER (CVC) WITH SUBCUTANEOUS PORT N/A 2020    Procedure: RBERAZOII-UMFX-K-CATH;  Surgeon: [facility] Provider;  Location: [facility] OR [address] [place] [address];  Service: Radiology;  Laterality: N/A;  Room 189/Cnidy    LAPAROSCOPIC SIGMOIDECTOMY N/A 3/25/2020    Procedure: COLECTOMY, SIGMOID, LAPAROSCOPIC, flex sig, ERAS high;  Surgeon: Silvio Man MD;  Location: [facility] OR [address] [place] [address];  Service: Colon and Rectal;  Laterality: N/A;    MOBILIZATION OF SPLENIC FLEXURE  3/25/2020    Procedure: MOBILIZATION, SPLENIC FLEXURE;  Surgeon: Silvio Man MD;  Location: [facility] OR [address] [place] [address];  Service: Colon and Rectal;;    tonsillectomy      TOTAL ABDOMINAL HYSTERECTOMY W/ BILATERAL SALPINGOOPHORECTOMY N/A 3/25/2020    Procedure: HYSTERECTOMY, TOTAL, ABDOMINAL, WITH BILATERAL SALPINGO-OOPHORECTOMY;  Surgeon: Keron Brady MD;  Location: [facility] OR [address] [place] [address];  Service: Oncology;  Laterality: N/A;       Oncology History   Malignant neoplasm of sigmoid colon   3/16/2020 Initial Diagnosis    Malignant neoplasm of sigmoid colon     3/31/2020 Cancer Staged    Staging form: Colon and Rectum, AJCC 8th Edition  - Clinical stage from 3/31/2020: Stage IIIC (cT4b, cN2a, cM0)     5/6/2020 -  Chemotherapy    Treatment Summary   Plan Name: OP FOLFOX 6 Q2W  Treatment Goal: Curative  Status: Active  Start Date: 5/6/2020  End Date: 10/14/2020 (Planned)  Provider: MAXWELL Dove MD  Chemotherapy: fluorouraciL injection 945 mg, 400 mg/m2 = 945 mg, Intravenous, Clinic/HOD 1 time, 5 of 12 cycles  Administration: 945 mg (5/6/2020), 945 mg (5/20/2020), 945 mg (6/3/2020), 945 mg (6/17/2020)  fluorouraciL 2,400 mg/m2 = 5,665 mg in sodium chloride 0.9% 240 mL chemo infusion, 2,400 mg/m2 = 5,665 mg, Intravenous, Over 46 hours, 5 of 12 cycles  Administration: 5,665 mg (5/6/2020), 5,665 mg (5/20/2020), 5,665 mg (6/3/2020), 5,665 mg (6/17/2020)  leucovorin calcium 900 mg in dextrose 5 % 250 mL infusion, 945 mg, Intravenous, Clinic/HOD 1 time, 5 of 12 cycles  Administration: 900 mg (5/6/2020), 900 mg (5/20/2020), 900 mg (6/3/2020), 900 mg (6/17/2020), 945 mg (6/30/2020)  oxaliplatin (ELOXATIN) 200 mg in dextrose 5 % 500 mL chemo infusion, 201 mg, Intravenous, Clinic/HOD 1 time, 5 of 12 cycles  Dose modification: 65 mg/m2 (original dose 85 mg/m2, Cycle 6)  Administration: 200 mg (5/6/2020), 200 mg (5/20/2020), 200 mg (6/3/2020), 200 mg (6/17/2020), 201 mg (6/30/2020)     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   4/3/2020 Initial Diagnosis    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     5/6/2020 -  Chemotherapy    Treatment Summary   Plan Name: OP FOLFOX 6 Q2W  Treatment Goal: Curative  Status: Active  Start Date: 5/6/2020  End Date: 10/9/2020 (Planned)  Provider: MAXWELL Dove MD  Chemotherapy: fluorouraciL injection 945 mg, 400 mg/m2 = 945 mg, Intravenous, Clinic/HOD 1 time, 3 of 12 cycles  Administration: 945 mg (5/6/2020), 945 mg  (5/20/2020), 945 mg (6/3/2020)  fluorouraciL 2,400 mg/m2 = 5,665 mg in sodium chloride 0.9% 240 mL chemo infusion, 2,400 mg/m2 = 5,665 mg, Intravenous, Over 46 hours, 3 of 12 cycles  Administration: 5,665 mg (5/6/2020), 5,665 mg (5/20/2020), 5,665 mg (6/3/2020)  leucovorin calcium 900 mg in dextrose 5 % 250 mL infusion, 945 mg, Intravenous, Clinic/HOD 1 time, 3 of 12 cycles  Administration: 900 mg (5/6/2020), 900 mg (5/20/2020), 900 mg (6/3/2020)  oxaliplatin (ELOXATIN) 200 mg in dextrose 5 % 500 mL chemo infusion, 201 mg, Intravenous, Clinic/HOD 1 time, 3 of 12 cycles  Administration: 200 mg (5/6/2020), 200 mg (5/20/2020), 200 mg (6/3/2020)          Allergies:  Review of patient's allergies indicates:   Allergen Reactions    Penicillin g      unknown    Penicillins Hives     childhood allergy        Follow-up  Pertinent negatives include no abdominal pain, chest pain, chills, congestion, coughing, fatigue, fever, headaches, joint swelling, nausea, neck pain, numbness, rash, sore throat, vomiting or weakness.    50-year-old female who began having abdominal discomfort in June of 2019.  She was diagnosed with kidney stones and a CT scan showed some thickening in her colon.  She was then sent to see her PCP who recommended a colonoscopy.  The patient put this off until around January of 2020 when her PCP was adamant that she undergo further workup and she underwent colonoscopy in February of 2020.  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  The scans did not show obvious evidence of metastatic disease.  There was a small lesion in the liver that was concerning for a cyst in this will need to be followed closely.  She has healed from her surgery and she did have 37 lymph nodes removed of which 4 positive for  cancer.     She has essentially been through 5 cycles of chemotherapy however we did have to stop her 5th cycle secondary to an oxaliplatin reaction.      Overall she feels well today and has no continue difficulty from her previous chemotherapy reaction.  She denies fevers, chills or night sweats.  Her cold intolerance and neuropathy is all resolved.  She denies chest pain or shortness of breath.  She denies any abdominal pain or early satiety or GI bleeding.  Her weight is relatively stable.    ECO  ECOG SCORE            Review of Systems   Constitutional: Negative for appetite change, chills, fatigue, fever and unexpected weight change.   HENT: Negative for congestion, facial swelling, mouth sores, sinus pressure, sinus pain, sore throat and trouble swallowing.    Eyes: Negative for photophobia, pain and visual disturbance.   Respiratory: Negative for cough, chest tightness, shortness of breath, wheezing and stridor.    Cardiovascular: Negative for chest pain and leg swelling.   Gastrointestinal: Negative for abdominal distention, abdominal pain, blood in stool, constipation, diarrhea, nausea, rectal pain and vomiting.   Genitourinary: Negative for dysuria, flank pain and urgency.   Musculoskeletal: Negative for back pain, gait problem, joint swelling and neck pain.   Skin: Negative for color change and rash.   Neurological: Negative for dizziness, syncope, weakness, light-headedness, numbness and headaches.   Hematological: Negative for adenopathy. Does not bruise/bleed easily.   Psychiatric/Behavioral: Negative for agitation, behavioral problems and confusion. The patient is not nervous/anxious.        Objective:      Physical Exam  Vitals signs reviewed.   Constitutional:       General: She is not in acute distress.     Appearance: She is well-developed. She is not diaphoretic.   HENT:      Head: Normocephalic.      Mouth/Throat:      Mouth: Mucous membranes are moist.   Eyes:      General: No scleral  icterus.     Extraocular Movements: Extraocular movements intact.      Pupils: Pupils are equal, round, and reactive to light.   Neck:      Musculoskeletal: Normal range of motion and neck supple. No neck rigidity.      Trachea: No tracheal deviation.   Cardiovascular:      Rate and Rhythm: Normal rate.   Pulmonary:      Effort: Pulmonary effort is normal. No respiratory distress.      Breath sounds: Normal breath sounds. No stridor. No wheezing.   Abdominal:      General: Bowel sounds are normal. There is no distension.      Palpations: Abdomen is soft. There is no mass.      Tenderness: There is no abdominal tenderness.      Comments: Protuberant   Musculoskeletal:         General: No deformity.   Skin:     Findings: No rash.   Neurological:      General: No focal deficit present.      Mental Status: She is alert and oriented to person, place, and time.      Cranial Nerves: No cranial nerve deficit.      Sensory: No sensory deficit.   Psychiatric:         Behavior: Behavior normal.         Thought Content: Thought content normal.         Judgment: Judgment normal.         Vitals:    07/20/20 1300   BP: (!) 141/90   Pulse: 91   Resp: 18   Temp: 97 °F (36.1 °C)         Results:     Lab Visit on 07/20/2020   Component Date Value Ref Range Status    WBC 07/20/2020 4.99  3.90 - 12.70 K/uL Final    RBC 07/20/2020 4.57  4.00 - 5.40 M/uL Final    Hemoglobin 07/20/2020 10.8* 12.0 - 16.0 g/dL Final    Hematocrit 07/20/2020 36.0* 37.0 - 48.5 % Final    Mean Corpuscular Volume 07/20/2020 79* 82 - 98 fL Final    Mean Corpuscular Hemoglobin 07/20/2020 23.6* 27.0 - 31.0 pg Final    Mean Corpuscular Hemoglobin Conc 07/20/2020 30.0* 32.0 - 36.0 g/dL Final    RDW 07/20/2020 20.5* 11.5 - 14.5 % Final    Platelets 07/20/2020 320  150 - 350 K/uL Final    MPV 07/20/2020 10.3  9.2 - 12.9 fL Final    Immature Granulocytes 07/20/2020 0.4  0.0 - 0.5 % Final    Gran # (ANC) 07/20/2020 2.8  1.8 - 7.7 K/uL Final    Immature Grans  (Abs) 07/20/2020 0.02  0.00 - 0.04 K/uL Final    Comment: Mild elevation in immature granulocytes is non specific and   can be seen in a variety of conditions including stress response,   acute inflammation, trauma and pregnancy. Correlation with other   laboratory and clinical findings is essential.      Lymph # 07/20/2020 1.4  1.0 - 4.8 K/uL Final    Mono # 07/20/2020 0.5  0.3 - 1.0 K/uL Final    Eos # 07/20/2020 0.2  0.0 - 0.5 K/uL Final    Baso # 07/20/2020 0.03  0.00 - 0.20 K/uL Final    nRBC 07/20/2020 0  0 /100 WBC Final    Gran% 07/20/2020 55.3  38.0 - 73.0 % Final    Lymph% 07/20/2020 28.9  18.0 - 48.0 % Final    Mono% 07/20/2020 10.0  4.0 - 15.0 % Final    Eosinophil% 07/20/2020 4.8  0.0 - 8.0 % Final    Basophil% 07/20/2020 0.6  0.0 - 1.9 % Final    Differential Method 07/20/2020 Automated   Final    CEA 07/20/2020 1.1  0.0 - 5.0 ng/mL Final    Comment: [employer] immunometric chemiluminescent   methodology is used. Results obtained with different assay   methods cannot be used interchangeably.    Elevated concentrations of CEA may be found in smokers or in   patients with nonmalignant conditions. CEA concentrations,   regardless of level, should not be interpreted as absolute   evidence of the presence or absence of malignant disease.   The [employer] CEA assay is not recommended as a screening procedure   for cancer detection.   Patients taking very high Biotin doses of >300 mcg/day may   cause a negative bias in this assay.       Sodium 07/20/2020 141  136 - 145 mmol/L Final    Potassium 07/20/2020 4.2  3.5 - 5.1 mmol/L Final    Chloride 07/20/2020 112* 95 - 110 mmol/L Final    CO2 07/20/2020 24  22 - 31 mmol/L Final    Glucose 07/20/2020 108  70 - 110 mg/dL Final    Comment: The ADA recommends the following guidelines for fasting glucose:  Normal:       less than 100 mg/dL  Prediabetes:  100 mg/dL to 125 mg/dL  Diabetes:     126 mg/dL or higher      BUN, Bld 07/20/2020 15   7 - 18 mg/dL Final    Creatinine 07/20/2020 0.85  0.50 - 1.40 mg/dL Final    Calcium 07/20/2020 10.9* 8.4 - 10.2 mg/dL Final    Total Protein 07/20/2020 7.5  6.0 - 8.4 g/dL Final    Albumin 07/20/2020 4.1  3.5 - 5.2 g/dL Final    Total Bilirubin 07/20/2020 0.4  0.2 - 1.3 mg/dL Final    Alkaline Phosphatase 07/20/2020 134  38 - 145 U/L Final    AST 07/20/2020 34  14 - 36 U/L Final    ALT 07/20/2020 26  0 - 35 U/L Final    Anion Gap 07/20/2020 5* 8 - 16 mmol/L Final    eGFR if African American 07/20/2020 >60  >60 mL/min/1.73 m^2 Final    eGFR if non African American 07/20/2020 >60  >60 mL/min/1.73 m^2 Final    Comment: Calculation used to obtain the estimated glomerular filtration  rate (eGFR) is the CKD-EPI equation.       Magnesium 07/20/2020 2.1  1.6 - 2.6 mg/dL Final       Assessment:     1. Malignant neoplasm of sigmoid colon    2. 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 Psychophysiological insomnia    4. Anxiety    5. Other constipation    6. Nausea    7.     Chemotherapy adverse reaction  Plan:   Plan: COVID-19 test pending    1,2,6.  Stage III colon cancer:   She did not complete cycle 5 in total.  I did have a long conversation with the patient about chemotherapy and I did review her laboratory studies with her today.  There is no obvious evidence of metastatic disease or recurrent disease.  Her CEA remains normal.  After thorough discussion we are going to re-attempt the FOLFOX with the addition of IV Benadryl and I am going to dose reduce her oxaliplatin.  If he does well will see her back in 2 weeks for evaluation prior to cycle 7.     3,4.  Anxiety and insomnia:   She has been on BuSpar the past.  She has also met with our psychologist.  Overall she is tolerating this well and we will continue to monitor closely.  I have encouraged her to follow up with our psychologist as scheduled.  She states that she is also on Wellbutrin.     5.  Constipation: She will continue to take a stool softener  daily and continue the MiraLax as needed.  She is aware to contact us with any problems or worsening symptoms will certainly see her sooner.      Pt displayed understanding of the above encounter and treatment plan. All thoughtful questions were answered to their satisfaction. Pte was advised to notify the care team or proceed to the ER if signs and symptoms worsen.

## 2020-07-21 ENCOUNTER — OFFICE VISIT (OUTPATIENT)
Dept: PSYCHIATRY | Facility: CLINIC | Age: 50
End: 2020-07-21
Payer: COMMERCIAL

## 2020-07-21 DIAGNOSIS — F41.0 GENERALIZED ANXIETY DISORDER WITH PANIC ATTACKS: Primary | ICD-10-CM

## 2020-07-21 DIAGNOSIS — F51.04 PSYCHOPHYSIOLOGICAL INSOMNIA: ICD-10-CM

## 2020-07-21 DIAGNOSIS — F41.1 GENERALIZED ANXIETY DISORDER WITH PANIC ATTACKS: Primary | ICD-10-CM

## 2020-07-21 PROCEDURE — 90834 PR PSYCHOTHERAPY W/PATIENT, 45 MIN: ICD-10-PCS | Mod: 95,,, | Performed by: PSYCHOLOGIST

## 2020-07-21 PROCEDURE — 90834 PSYTX W PT 45 MINUTES: CPT | Mod: 95,,, | Performed by: PSYCHOLOGIST

## 2020-07-21 NOTE — PROGRESS NOTES
Telemedicine PSYCHO-ONCOLOGY NOTE/ Individual Psychotherapy   (patient not on premises due to COVID-19 restrictions)    Consultation started: 7/21/2020 at 11:01 AM   The chief complaint leading to consultation is: adaptation to disease and treatment, anxiety, insomnia  The patient location is:   Patient home  Virtual visit with synchronous audio and video  Patient alone at the time of consultation    Each patient provided medical services by telemedicine is:  (1) informed of the relationship between the physician and patient and the respective role of any other health care provider with respect to management of the patient; and (2) notified that he or she may decline to receive medical services by telemedicine and may withdraw from such care at any time.    Crisis Disclaimer: Patient was informed that due to the virtual nature of the visit, that if a crisis develops, protocols will be implemented to ensure patient safety, including but not limited to: 1) Initiating a welfare check with local Law Enforcement, 2) Calling 1/National Crisis Hotline, and/or 3) Initiating PEC/CEC procedures.     Date: 7/21/2020   Site of therapist:  [facility]         Therapeutic Intervention: Met with patient.  Outpatient - Behavior modifying psychotherapy 45 min - CPT code 58077    Referring provider:    Chief complaint/reason for encounter: depression, anxiety and sleep   Met with patient to evaluate psychosocial adaptation to survivorship of colon cancer    Patient was last seen by me on 6/29/2020    Objective:      Gail Mckinnon arrived promptly for the session (via video).  Ms. Mckinnon was independently ambulatory at the time of session. The patient was fully cooperative throughout the session.  Appearance: age appropriate, casually  dressed, adequately  groomed  Behavior/Cooperation: friendly and cooperative  Speech: normal in rate, volume, and tone and appropriate quality, quantity and organization of  sentences  Mood: dysthymic  Affect: dysphoric, tearful  Thought Process: goal-directed, logical  Thought Content: normal,  No delusions or paranoia; did not appear to be responding to internal stimuli during the session  Orientation: grossly intact  Memory: Grossly intact  Attention Span/Concentration: Attends to session without distraction; reports no difficulty  Fund of Knowledge: average  Estimate of Intelligence: average from verbal skills and history  Cognition: grossly intact  Insight: patient has awareness of illness; good insight into own behavior and behavior of others  Judgment: the patient's behavior is adequate to circumstances      Interval history and content of current session: Patient discussed events since the time of last visit. Discussed current adaptation to disease and treatment status. Reports to be coping with significant difficulty, especially since having another allergic reaction to chemotherapy yesterday. Discussed nature and treatment of cognitive distortions. Evaluated cognitive response, paying particular attention to negative intrusive thoughts of a persistent and detrimental nature. Thoughts of this type are in evidence with moderate distress. Focused on providing cognitive behavioral therapy to address negative cognitions. Examined proactive behaviors that may be implemented to minimize or ameliorate psychosocial stressors secondary to diagnosis and treatment.  (focus on effort in walking, not result)    Sleep hygiene assessed and discussed. Night time rumination, in bed without sleep 1-2 hours/night, similar wake time week and weekend (5:30/6).       Risk parameters:   Patient reports no suicidal ideation  Patient reports no homicidal ideation  Patient reports no self-injurious behavior  Patient reports no violent behavior   Safety needs:  None at this time      Verbal deficits: None     Patient's response to intervention:The patient's response to intervention is accepting,  motivated.     Progress toward goals and other mental status changes:  The patient's progress toward goals is limited.      Progress to date:Progress as Expected      Goals from last visit: Not attempted      Patient reported outcomes:      Distress Thermometer:   Distress Score    Distress Score: 3        Practical Problems Physical Problems   : No Appearance: No   Housing: No Bathing / Dressing: No   Insurance / Financial: No Breathing: No    Transportation: No  Changes in Urination: No    Work / School: No  Constipation: Yes   Treatment Decisions: Yes  Diarrhea: No     Eating: No    Family Problems Fatigue: Yes    Dealing with Children: No Feeling Swollen: No    Dealing with Partner: No Fevers: No    Ability to Have Children: No  Getting Around: No       Indigestion: No     Memory / Concentration: Yes   Emotional Problems Mouth Sores: Yes    Depression: Yes  Nausea: No    Fears: Yes  Nose Dry / Congested: Yes    Nervousness: No  Pain: No    Sadness: Yes Sexual: No    Worry: Yes Skin Dry / Itchy: No    Loss of Interest in Usual Activities: No Sleep: Yes     Substance Abuse: No    Spiritual/Religions Concerns Tingling in Hands / Feet: No   Spritual / Latter day Concerns: No         Other Problems              PHQ-9=  Initial visit: 12    PHQ ANSWERS    Q1. Little interest or pleasure in doing things: Several days (07/18/20 0751)  Q2. Feeling down, depressed, or hopeless: Several days (07/18/20 0751)  Q3. Trouble falling or staying asleep, or sleeping too much: More than half the days (07/18/20 0751)  Q4. Feeling tired or having little energy: Several days (07/18/20 0751)  Q5. Poor appetite or overeating: Several days (07/18/20 0751)  Q6. Feeling bad about yourself - or that you are a failure or have let yourself or your family down: Several days (07/18/20 0751)  Q7. Trouble concentrating on things, such as reading the newspaper or watching television: Several days (07/18/20 0751)  Q8. Moving or speaking so  slowly that other people could have noticed. Or the opposite - being so fidgety or restless that you have been moving around a lot more than usual: Not at all (07/18/20 0751)  Q9. Thoughts that you would be better off dead, or of hurting yourself in some way: Not at all (07/18/20 0751)    PHQ8 Score : 8 (07/18/20 0751)  PHQ-9 Total Score: 8 (07/18/20 0751)        TOMAS-7= Initial visit :8     GAD7 7/18/2020   1. Feeling nervous, anxious, or on edge? 1   2. Not being able to stop or control worrying? 1   3. Worrying too much about different things? 1   4. Trouble relaxing? 1   5. Being so restless that it is hard to sit still? 0   6. Becoming easily annoyed or irritable? 1   7. Feeling afraid as if something awful might happen? 1   TOMAS-7 Score 6         Client Strengths: verbal, intelligent, successful, good social support, good insight, commitment to wellness, strong miah, strong cultural traditions      Treatment Plan:individual psychotherapy and medication management by physician (referred for Dr Hurtado)  · Target symptoms: anxiety , insomnia  · Why chosen therapy is appropriate versus another modality: relevant to diagnosis, patient responds to this modality, evidence based practice  · Outcome monitoring methods: self-report, checklist/rating scale  · Therapeutic intervention type: behavior modifying psychotherapy  · Prognosis: Good      Behavioral goals:    Exercise:  Walk 15 min, 3x week for fatigue mitigation   Stress management:  Relaxation exercises   Social engagement:   Nutrition:   Smoking Cessation:   Therapy: read cognitive distortions, write down noted distortions    PES- part time at work    No visible clocks, write down to-do at night, trazodone (100 mg) as per Dr. Dove    Return to clinic: 2 weeks     Length of Service (minutes direct face-to-face contact): 45      ICD-10-CM ICD-9-CM   1. Generalized anxiety disorder with panic attacks  F41.1 300.02    F41.0 300.01   2. Psychophysiological  insomnia  F51.04 307.42         Lee Gibbs, PhD  [place] License #[id number]

## 2020-07-22 ENCOUNTER — DOCUMENTATION ONLY (OUTPATIENT)
Dept: GYNECOLOGIC ONCOLOGY | Facility: CLINIC | Age: 50
End: 2020-07-22

## 2020-07-22 ENCOUNTER — TELEPHONE (OUTPATIENT)
Dept: GYNECOLOGIC ONCOLOGY | Facility: CLINIC | Age: 50
End: 2020-07-22

## 2020-07-24 ENCOUNTER — TELEPHONE (OUTPATIENT)
Dept: HEMATOLOGY/ONCOLOGY | Facility: CLINIC | Age: 50
End: 2020-07-24

## 2020-07-24 NOTE — TELEPHONE ENCOUNTER
LVM on cell with details for changes to Monday's appointments. Also sent [employer] message to pt.

## 2020-07-27 ENCOUNTER — OFFICE VISIT (OUTPATIENT)
Dept: HEMATOLOGY/ONCOLOGY | Facility: CLINIC | Age: 50
End: 2020-07-27
Payer: COMMERCIAL

## 2020-07-27 ENCOUNTER — LAB VISIT (OUTPATIENT)
Dept: LAB | Facility: HOSPITAL | Age: 50
End: 2020-07-27
Attending: INTERNAL MEDICINE
Payer: COMMERCIAL

## 2020-07-27 VITALS
HEART RATE: 86 BPM | SYSTOLIC BLOOD PRESSURE: 128 MMHG | RESPIRATION RATE: 20 BRPM | WEIGHT: 265.44 LBS | DIASTOLIC BLOOD PRESSURE: 89 MMHG | TEMPERATURE: 98 F | OXYGEN SATURATION: 99 % | BODY MASS INDEX: 42.84 KG/M2

## 2020-07-27 DIAGNOSIS — C77.2 METASTASIS TO INTESTINAL LYMPH NODE: ICD-10-CM

## 2020-07-27 DIAGNOSIS — C18.7 MALIGNANT NEOPLASM OF SIGMOID COLON: Primary | ICD-10-CM

## 2020-07-27 DIAGNOSIS — C18.7 MALIGNANT NEOPLASM OF SIGMOID COLON: ICD-10-CM

## 2020-07-27 LAB
ALBUMIN SERPL BCP-MCNC: 4.1 G/DL (ref 3.5–5.2)
ALP SERPL-CCNC: 143 U/L (ref 38–145)
ALT SERPL W/O P-5'-P-CCNC: 28 U/L (ref 0–35)
ANION GAP SERPL CALC-SCNC: 5 MMOL/L (ref 8–16)
AST SERPL-CCNC: 30 U/L (ref 14–36)
BASOPHILS # BLD AUTO: 0.04 K/UL (ref 0–0.2)
BASOPHILS NFR BLD: 0.7 % (ref 0–1.9)
BILIRUB SERPL-MCNC: 0.3 MG/DL (ref 0.2–1.3)
BUN SERPL-MCNC: 11 MG/DL (ref 7–18)
CALCIUM SERPL-MCNC: 11.2 MG/DL (ref 8.4–10.2)
CHLORIDE SERPL-SCNC: 110 MMOL/L (ref 95–110)
CO2 SERPL-SCNC: 25 MMOL/L (ref 22–31)
CREAT SERPL-MCNC: 0.69 MG/DL (ref 0.5–1.4)
DIFFERENTIAL METHOD: ABNORMAL
EOSINOPHIL # BLD AUTO: 0.3 K/UL (ref 0–0.5)
EOSINOPHIL NFR BLD: 5.5 % (ref 0–8)
ERYTHROCYTE [DISTWIDTH] IN BLOOD BY AUTOMATED COUNT: 20.5 % (ref 11.5–14.5)
EST. GFR  (AFRICAN AMERICAN): >60 ML/MIN/1.73 M^2
EST. GFR  (NON AFRICAN AMERICAN): >60 ML/MIN/1.73 M^2
GLUCOSE SERPL-MCNC: 106 MG/DL (ref 70–110)
HCT VFR BLD AUTO: 37.7 % (ref 37–48.5)
HGB BLD-MCNC: 11 G/DL (ref 12–16)
IMM GRANULOCYTES # BLD AUTO: 0.03 K/UL (ref 0–0.04)
IMM GRANULOCYTES NFR BLD AUTO: 0.5 % (ref 0–0.5)
LYMPHOCYTES # BLD AUTO: 1.9 K/UL (ref 1–4.8)
LYMPHOCYTES NFR BLD: 32.6 % (ref 18–48)
MCH RBC QN AUTO: 23.3 PG (ref 27–31)
MCHC RBC AUTO-ENTMCNC: 29.2 G/DL (ref 32–36)
MCV RBC AUTO: 80 FL (ref 82–98)
MONOCYTES # BLD AUTO: 0.6 K/UL (ref 0.3–1)
MONOCYTES NFR BLD: 10.1 % (ref 4–15)
NEUTROPHILS # BLD AUTO: 2.9 K/UL (ref 1.8–7.7)
NEUTROPHILS NFR BLD: 50.6 % (ref 38–73)
NRBC BLD-RTO: 0 /100 WBC
PLATELET # BLD AUTO: 356 K/UL (ref 150–350)
PMV BLD AUTO: 10.3 FL (ref 9.2–12.9)
POTASSIUM SERPL-SCNC: 4.1 MMOL/L (ref 3.5–5.1)
PROT SERPL-MCNC: 7.2 G/DL (ref 6–8.4)
RBC # BLD AUTO: 4.73 M/UL (ref 4–5.4)
SODIUM SERPL-SCNC: 140 MMOL/L (ref 136–145)
WBC # BLD AUTO: 5.67 K/UL (ref 3.9–12.7)

## 2020-07-27 PROCEDURE — 3079F PR MOST RECENT DIASTOLIC BLOOD PRESSURE 80-89 MM HG: ICD-10-PCS | Mod: CPTII,S$GLB,, | Performed by: INTERNAL MEDICINE

## 2020-07-27 PROCEDURE — 3074F SYST BP LT 130 MM HG: CPT | Mod: CPTII,S$GLB,, | Performed by: INTERNAL MEDICINE

## 2020-07-27 PROCEDURE — 3008F PR BODY MASS INDEX (BMI) DOCUMENTED: ICD-10-PCS | Mod: CPTII,S$GLB,, | Performed by: INTERNAL MEDICINE

## 2020-07-27 PROCEDURE — 3079F DIAST BP 80-89 MM HG: CPT | Mod: CPTII,S$GLB,, | Performed by: INTERNAL MEDICINE

## 2020-07-27 PROCEDURE — 99999 PR PBB SHADOW E&M-EST. PATIENT-LVL IV: ICD-10-PCS | Mod: PBBFAC,,, | Performed by: INTERNAL MEDICINE

## 2020-07-27 PROCEDURE — 85025 COMPLETE CBC W/AUTO DIFF WBC: CPT

## 2020-07-27 PROCEDURE — 3008F BODY MASS INDEX DOCD: CPT | Mod: CPTII,S$GLB,, | Performed by: INTERNAL MEDICINE

## 2020-07-27 PROCEDURE — 80053 COMPREHEN METABOLIC PANEL: CPT | Mod: PN

## 2020-07-27 PROCEDURE — 99214 OFFICE O/P EST MOD 30 MIN: CPT | Mod: S$GLB,,, | Performed by: INTERNAL MEDICINE

## 2020-07-27 PROCEDURE — 85025 COMPLETE CBC W/AUTO DIFF WBC: CPT | Mod: PN

## 2020-07-27 PROCEDURE — 36415 COLL VENOUS BLD VENIPUNCTURE: CPT | Mod: PN

## 2020-07-27 PROCEDURE — 99999 PR PBB SHADOW E&M-EST. PATIENT-LVL IV: CPT | Mod: PBBFAC,,, | Performed by: INTERNAL MEDICINE

## 2020-07-27 PROCEDURE — 80053 COMPREHEN METABOLIC PANEL: CPT

## 2020-07-27 PROCEDURE — 3074F PR MOST RECENT SYSTOLIC BLOOD PRESSURE < 130 MM HG: ICD-10-PCS | Mod: CPTII,S$GLB,, | Performed by: INTERNAL MEDICINE

## 2020-07-27 PROCEDURE — 99214 PR OFFICE/OUTPT VISIT, EST, LEVL IV, 30-39 MIN: ICD-10-PCS | Mod: S$GLB,,, | Performed by: INTERNAL MEDICINE

## 2020-07-27 RX ORDER — HEPARIN 100 UNIT/ML
500 SYRINGE INTRAVENOUS
Status: CANCELLED | OUTPATIENT
Start: 2020-08-04

## 2020-07-27 RX ORDER — SODIUM CHLORIDE 0.9 % (FLUSH) 0.9 %
10 SYRINGE (ML) INJECTION
Status: CANCELLED | OUTPATIENT
Start: 2020-08-06

## 2020-07-27 RX ORDER — FLUOROURACIL 50 MG/ML
400 INJECTION, SOLUTION INTRAVENOUS
Status: CANCELLED | OUTPATIENT
Start: 2020-08-04

## 2020-07-27 RX ORDER — SODIUM CHLORIDE 0.9 % (FLUSH) 0.9 %
10 SYRINGE (ML) INJECTION
Status: CANCELLED | OUTPATIENT
Start: 2020-08-04

## 2020-07-27 RX ORDER — DIPHENHYDRAMINE HYDROCHLORIDE 50 MG/ML
25 INJECTION INTRAMUSCULAR; INTRAVENOUS
Status: CANCELLED
Start: 2020-08-04

## 2020-07-27 RX ORDER — DIPHENHYDRAMINE HYDROCHLORIDE 50 MG/ML
50 INJECTION INTRAMUSCULAR; INTRAVENOUS ONCE AS NEEDED
Status: CANCELLED | OUTPATIENT
Start: 2020-08-04

## 2020-07-27 RX ORDER — EPINEPHRINE 0.3 MG/.3ML
0.3 INJECTION SUBCUTANEOUS ONCE AS NEEDED
Status: CANCELLED | OUTPATIENT
Start: 2020-08-04

## 2020-07-27 RX ORDER — HEPARIN 100 UNIT/ML
500 SYRINGE INTRAVENOUS
Status: CANCELLED | OUTPATIENT
Start: 2020-08-06

## 2020-07-27 NOTE — PROGRESS NOTES
Subjective:       Patient ID: Gail Mckinnon is a 50 y.o. female.    Chief Complaint: Colon cancer      History:  Past Medical History:   Diagnosis Date    Anxiety     Depression     FH: ovarian cancer 3/16/2020    Hx of psychiatric care     Effexor, Paxil, Lexapro, Zoloft, Wellbutrin, Trazodone Buspar    Hyperthyroidism     Hypothyroid     Kidney calculi     Malignant neoplasm of sigmoid colon 3/16/2020    Menorrhagia     Multinodular goiter 2012    Palpitation     Psychiatric problem     Venous insufficiency      Past Surgical History:   Procedure Laterality Date     SECTION, CLASSIC      x3    COLONOSCOPY N/A 2020    Procedure: COLONOSCOPY;  Surgeon: Shane Parker MD;  Location: [facility];  Service: Endoscopy;  Laterality: N/A;    CYSTOSCOPY W/ URETERAL STENT PLACEMENT Bilateral 3/25/2020    Procedure: CYSTOSCOPY, WITH URETERAL STENT INSERTION;  Surgeon: Claudio Tyson MD;  Location: [facility] OR [address] [place] [address];  Service: Urology;  Laterality: Bilateral;    INSERTION OF TUNNELED CENTRAL VENOUS CATHETER (CVC) WITH SUBCUTANEOUS PORT N/A 2020    Procedure: ONORLIEXH-OFSR-W-CATH;  Surgeon: [facility] Diagnostic Provider;  Location: [facility] OR [address] [place] [address];  Service: Radiology;  Laterality: N/A;  Room 189/Cindy    LAPAROSCOPIC SIGMOIDECTOMY N/A 3/25/2020    Procedure: COLECTOMY, SIGMOID, LAPAROSCOPIC, flex sig, ERAS high;  Surgeon: Silvio Man MD;  Location: [facility] OR [address] [place] [address];  Service: Colon and Rectal;  Laterality: N/A;    MOBILIZATION OF SPLENIC FLEXURE  3/25/2020    Procedure: MOBILIZATION, SPLENIC FLEXURE;  Surgeon: Silvio Man MD;  Location: [facility] OR [address] [place] [address];  Service: Colon and Rectal;;    tonsillectomy      TOTAL ABDOMINAL HYSTERECTOMY W/ BILATERAL SALPINGOOPHORECTOMY N/A 3/25/2020    Procedure: HYSTERECTOMY, TOTAL, ABDOMINAL, WITH BILATERAL SALPINGO-OOPHORECTOMY;  Surgeon: Keron Brady MD;  Location: [facility] OR [address] [place] [address];  Service: Oncology;  Laterality: N/A;       Oncology History   Malignant neoplasm of sigmoid colon   3/16/2020 Initial Diagnosis    Malignant neoplasm of sigmoid colon     3/31/2020 Cancer Staged    Staging form: Colon and Rectum, AJCC 8th Edition  - Clinical stage from 3/31/2020: Stage IIIC (cT4b, cN2a, cM0)     5/6/2020 -  Chemotherapy    Treatment Summary   Plan Name: OP FOLFOX 6 Q2W  Treatment Goal: Curative  Status: Active  Start Date: 5/6/2020  End Date: 10/15/2020 (Planned)  Provider: MAXWELL Dove MD  Chemotherapy: fluorouraciL injection 945 mg, 400 mg/m2 = 945 mg, Intravenous, Clinic/HOD 1 time, 6 of 12 cycles  Administration: 945 mg (5/6/2020), 945 mg (5/20/2020), 945 mg (6/3/2020), 945 mg (6/17/2020)  fluorouraciL 2,400 mg/m2 = 5,665 mg in sodium chloride 0.9% 240 mL chemo infusion, 2,400 mg/m2 = 5,665 mg, Intravenous, Over 46 hours, 6 of 12 cycles  Administration: 5,665 mg (5/6/2020), 5,665 mg (5/20/2020), 5,665 mg (6/3/2020), 5,665 mg (6/17/2020)  leucovorin calcium 900 mg in dextrose 5 % 250 mL infusion, 945 mg, Intravenous, Clinic/HOD 1 time, 6 of 12 cycles  Administration: 900 mg (5/6/2020), 900 mg (5/20/2020), 900 mg (6/3/2020), 900 mg (6/17/2020), 945 mg (6/30/2020), 945 mg (7/20/2020)  oxaliplatin (ELOXATIN) 200 mg in dextrose 5 % 500 mL chemo infusion, 201 mg, Intravenous, Clinic/HOD 1 time, 6 of 6 cycles  Dose modification: 65 mg/m2 (original dose 85 mg/m2, Cycle 6)  Administration: 200 mg (5/6/2020), 200 mg (5/20/2020), 200 mg (6/3/2020), 200 mg (6/17/2020), 201 mg (6/30/2020), 150 mg (7/20/2020)     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   4/3/2020 Initial Diagnosis    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     5/6/2020 -  Chemotherapy    Treatment Summary   Plan Name: OP FOLFOX 6 Q2W  Treatment Goal: Curative  Status: Active  Start Date: 5/6/2020  End Date: 10/9/2020 (Planned)  Provider: MAXWELL Dove MD  Chemotherapy: fluorouraciL injection 945 mg, 400 mg/m2 = 945 mg, Intravenous, Clinic/HOD 1 time, 3 of 12  cycles  Administration: 945 mg (5/6/2020), 945 mg (5/20/2020), 945 mg (6/3/2020)  fluorouraciL 2,400 mg/m2 = 5,665 mg in sodium chloride 0.9% 240 mL chemo infusion, 2,400 mg/m2 = 5,665 mg, Intravenous, Over 46 hours, 3 of 12 cycles  Administration: 5,665 mg (5/6/2020), 5,665 mg (5/20/2020), 5,665 mg (6/3/2020)  leucovorin calcium 900 mg in dextrose 5 % 250 mL infusion, 945 mg, Intravenous, Clinic/HOD 1 time, 3 of 12 cycles  Administration: 900 mg (5/6/2020), 900 mg (5/20/2020), 900 mg (6/3/2020)  oxaliplatin (ELOXATIN) 200 mg in dextrose 5 % 500 mL chemo infusion, 201 mg, Intravenous, Clinic/HOD 1 time, 3 of 12 cycles  Administration: 200 mg (5/6/2020), 200 mg (5/20/2020), 200 mg (6/3/2020)          Allergies:  Review of patient's allergies indicates:   Allergen Reactions    Penicillin g      unknown    Penicillins Hives     childhood allergy        Follow-up  Pertinent negatives include no abdominal pain, chest pain, chills, congestion, coughing, fatigue, fever, headaches, joint swelling, nausea, neck pain, numbness, rash, sore throat, vomiting or weakness.    50-year-old female who began having abdominal discomfort in June of 2019.  She was diagnosed with kidney stones and a CT scan showed some thickening in her colon.  She was then sent to see her PCP who recommended a colonoscopy.  The patient put this off until around January of 2020 when her PCP was adamant that she undergo further workup and she underwent colonoscopy in February of 2020.  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  The scans did not show obvious evidence of metastatic disease.  There was a small lesion in the liver that was concerning for a cyst in this will need to be followed closely.  She has healed from her surgery and she did have 37  lymph nodes removed of which 4 positive for cancer.     She has essentially been through 6 cycles of chemotherapy however we did have to stop her 5th and 6th cycle secondary to an oxaliplatin reaction.      We did attempt a reexposure to oxaliplatin last week however she states that she had some shortness of breath as well as itching in her hands and scalp and therefore the chemotherapy was discontinued.  The patient has had 4 complete cycles and 2 partial cycles of chemotherapy.  She denies any complaints today.  Her weight and appetite are stable.  She denies any melena, hematochezia red blood per rectum.  She denies any pain.  She denies any shortness of breath, chest pain or any CNS type symptoms.    ECO  ECOG SCORE            Review of Systems   Constitutional: Negative for appetite change, chills, fatigue, fever and unexpected weight change.   HENT: Negative for congestion, facial swelling, mouth sores, sinus pressure, sinus pain, sore throat and trouble swallowing.    Eyes: Negative for photophobia, pain and visual disturbance.   Respiratory: Negative for cough, chest tightness, shortness of breath, wheezing and stridor.    Cardiovascular: Negative for chest pain and leg swelling.   Gastrointestinal: Negative for abdominal distention, abdominal pain, blood in stool, constipation, diarrhea, nausea, rectal pain and vomiting.   Genitourinary: Negative for dysuria, flank pain and urgency.   Musculoskeletal: Negative for back pain, gait problem, joint swelling and neck pain.   Skin: Negative for color change and rash.   Neurological: Negative for dizziness, syncope, weakness, light-headedness, numbness and headaches.   Hematological: Negative for adenopathy. Does not bruise/bleed easily.   Psychiatric/Behavioral: Negative for agitation, behavioral problems and confusion. The patient is not nervous/anxious.        Objective:      Physical Exam  Vitals signs reviewed.   Constitutional:       General: She is not  in acute distress.     Appearance: She is well-developed. She is not diaphoretic.   HENT:      Head: Normocephalic.      Mouth/Throat:      Mouth: Mucous membranes are moist.   Eyes:      General: No scleral icterus.     Extraocular Movements: Extraocular movements intact.      Pupils: Pupils are equal, round, and reactive to light.   Neck:      Musculoskeletal: Normal range of motion and neck supple. No neck rigidity.      Trachea: No tracheal deviation.   Cardiovascular:      Rate and Rhythm: Normal rate.   Pulmonary:      Effort: Pulmonary effort is normal. No respiratory distress.      Breath sounds: Normal breath sounds. No stridor. No wheezing.   Abdominal:      General: Bowel sounds are normal. There is no distension.      Palpations: Abdomen is soft. There is no mass.      Tenderness: There is no abdominal tenderness.      Comments: Protuberant   Musculoskeletal:         General: No deformity.   Skin:     Findings: No rash.   Neurological:      General: No focal deficit present.      Mental Status: She is alert and oriented to person, place, and time.      Cranial Nerves: No cranial nerve deficit.      Sensory: No sensory deficit.   Psychiatric:         Behavior: Behavior normal.         Thought Content: Thought content normal.         Judgment: Judgment normal.         Vitals:    07/27/20 0949   BP: 128/89   Pulse: 86   Resp: 20   Temp: 97.9 °F (36.6 °C)         Results:     Lab Visit on 07/27/2020   Component Date Value Ref Range Status    WBC 07/27/2020 5.67  3.90 - 12.70 K/uL Final    RBC 07/27/2020 4.73  4.00 - 5.40 M/uL Final    Hemoglobin 07/27/2020 11.0* 12.0 - 16.0 g/dL Final    Hematocrit 07/27/2020 37.7  37.0 - 48.5 % Final    Mean Corpuscular Volume 07/27/2020 80* 82 - 98 fL Final    Mean Corpuscular Hemoglobin 07/27/2020 23.3* 27.0 - 31.0 pg Final    Mean Corpuscular Hemoglobin Conc 07/27/2020 29.2* 32.0 - 36.0 g/dL Final    RDW 07/27/2020 20.5* 11.5 - 14.5 % Final    Platelets  07/27/2020 356* 150 - 350 K/uL Final    MPV 07/27/2020 10.3  9.2 - 12.9 fL Final    Immature Granulocytes 07/27/2020 0.5  0.0 - 0.5 % Final    Gran # (ANC) 07/27/2020 2.9  1.8 - 7.7 K/uL Final    Immature Grans (Abs) 07/27/2020 0.03  0.00 - 0.04 K/uL Final    Comment: Mild elevation in immature granulocytes is non specific and   can be seen in a variety of conditions including stress response,   acute inflammation, trauma and pregnancy. Correlation with other   laboratory and clinical findings is essential.      Lymph # 07/27/2020 1.9  1.0 - 4.8 K/uL Final    Mono # 07/27/2020 0.6  0.3 - 1.0 K/uL Final    Eos # 07/27/2020 0.3  0.0 - 0.5 K/uL Final    Baso # 07/27/2020 0.04  0.00 - 0.20 K/uL Final    nRBC 07/27/2020 0  0 /100 WBC Final    Gran% 07/27/2020 50.6  38.0 - 73.0 % Final    Lymph% 07/27/2020 32.6  18.0 - 48.0 % Final    Mono% 07/27/2020 10.1  4.0 - 15.0 % Final    Eosinophil% 07/27/2020 5.5  0.0 - 8.0 % Final    Basophil% 07/27/2020 0.7  0.0 - 1.9 % Final    Differential Method 07/27/2020 Automated   Final    Sodium 07/27/2020 140  136 - 145 mmol/L Final    Potassium 07/27/2020 4.1  3.5 - 5.1 mmol/L Final    Chloride 07/27/2020 110  95 - 110 mmol/L Final    CO2 07/27/2020 25  22 - 31 mmol/L Final    Glucose 07/27/2020 106  70 - 110 mg/dL Final    Comment: The ADA recommends the following guidelines for fasting glucose:  Normal:       less than 100 mg/dL  Prediabetes:  100 mg/dL to 125 mg/dL  Diabetes:     126 mg/dL or higher      BUN, Bld 07/27/2020 11  7 - 18 mg/dL Final    Creatinine 07/27/2020 0.69  0.50 - 1.40 mg/dL Final    Calcium 07/27/2020 11.2* 8.4 - 10.2 mg/dL Final    Total Protein 07/27/2020 7.2  6.0 - 8.4 g/dL Final    Albumin 07/27/2020 4.1  3.5 - 5.2 g/dL Final    Total Bilirubin 07/27/2020 0.3  0.2 - 1.3 mg/dL Final    Alkaline Phosphatase 07/27/2020 143  38 - 145 U/L Final    AST 07/27/2020 30  14 - 36 U/L Final    ALT 07/27/2020 28  0 - 35 U/L Final    Anion  Gap 07/27/2020 5* 8 - 16 mmol/L Final    eGFR if African American 07/27/2020 >60  >60 mL/min/1.73 m^2 Final    eGFR if non African American 07/27/2020 >60  >60 mL/min/1.73 m^2 Final    Comment: Calculation used to obtain the estimated glomerular filtration  rate (eGFR) is the CKD-EPI equation.          Assessment:     1. Malignant neoplasm of sigmoid colon    2. 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 Psychophysiological insomnia    4. Anxiety    5. Other constipation    6. Nausea    7.     Chemotherapy adverse reaction  Plan:   Plan: COVID-19 test pending    1,2,6.  Stage III colon cancer:   Cycle 5 and 6 were not completed in total.  I did talk to her extensively today regarding her chemotherapy and we are going to proceed with single agent fluorouracil for now.  I did discuss FOLFIRI with her as well as observation and capecitabine however after the discussion she wishes to proceed with single agent infusional fluorouracil.  We did talk about the risk versus benefits of this.  She has a trip planned in the immediate future to the [place] and we did discuss neutropenic precautions at length.  Tentatively I will see her back in 2 weeks for evaluation with a CBC, CMP, magnesium and CEA however she is aware to contact me with any temperature over 100.4° F or any new problems and we will see her back sooner.     3,4.  Anxiety and insomnia:   She has been on BuSpar the past.  She has also met with our psychologist.  Overall she is tolerating this well and we will continue to monitor closely.  Her Wellbutrin was increased to b.i.d..     5.  Constipation: She will continue to take a stool softener daily and continue the MiraLax as needed.  She is aware to contact us with any problems or worsening symptoms will certainly see her sooner.      Pt displayed understanding of the above encounter and treatment plan. All thoughtful questions were answered to their satisfaction. Pte was advised to notify the care team or  proceed to the ER if signs and symptoms worsen.

## 2020-07-28 ENCOUNTER — DOCUMENTATION ONLY (OUTPATIENT)
Dept: INFUSION THERAPY | Facility: HOSPITAL | Age: 50
End: 2020-07-28

## 2020-07-28 NOTE — PROGRESS NOTES
[9:50 AM] Nery Hudson      Mrn [id number] had attempted chemo last week and had a reaction, about 10 mintes in.  Dr. Dove saw her yesterday, and changed the plan.  She thought she'd be getting a dose this week, since she really didn't get any last week.  Anh told me yesterday, this week is too early.  I just wanted to verify before I speak to Dr. Dove or the pt.    ?[10:03 AM] Jackelin Sy      Ok we need a couple things clarified, the note says that she is proceeding with single agent 5fu, but it looks like in the plan she will still be getting leucovorin, 5fu bolus, and 5fu pump.  and then it would be up to him since she only  received a small portion of oxaliplatin both times if he is ok to treat her this week    ??[12:57 PM] Nery Hannah I finally heard back from Derrell.  He said he edited her plan yesterday.    ?[1:05 PM] Jackelin Sy      ok so when does he want her treated    ?[1:16 PM] Nery Hudson      He told her this week.  Do y'all have anything open?    ?[1:27 PM] Jackelin Sy      830 tomorrow    ?[1:28 PM] Nery Hudson      you are fantastic

## 2020-07-29 ENCOUNTER — TELEPHONE (OUTPATIENT)
Dept: HEMATOLOGY/ONCOLOGY | Facility: CLINIC | Age: 50
End: 2020-07-29

## 2020-07-29 ENCOUNTER — INFUSION (OUTPATIENT)
Dept: INFUSION THERAPY | Facility: HOSPITAL | Age: 50
End: 2020-07-29
Attending: INTERNAL MEDICINE
Payer: COMMERCIAL

## 2020-07-29 VITALS
RESPIRATION RATE: 18 BRPM | SYSTOLIC BLOOD PRESSURE: 128 MMHG | WEIGHT: 265 LBS | TEMPERATURE: 98 F | HEIGHT: 66 IN | HEART RATE: 94 BPM | DIASTOLIC BLOOD PRESSURE: 76 MMHG | BODY MASS INDEX: 42.59 KG/M2

## 2020-07-29 DIAGNOSIS — C77.2 METASTASIS TO INTESTINAL LYMPH NODE: Primary | ICD-10-CM

## 2020-07-29 DIAGNOSIS — C18.7 MALIGNANT NEOPLASM OF SIGMOID COLON: ICD-10-CM

## 2020-07-29 PROCEDURE — 96368 THER/DIAG CONCURRENT INF: CPT | Mod: PN

## 2020-07-29 PROCEDURE — 96367 TX/PROPH/DG ADDL SEQ IV INF: CPT | Mod: PN

## 2020-07-29 PROCEDURE — 96416 CHEMO PROLONG INFUSE W/PUMP: CPT | Mod: PN

## 2020-07-29 PROCEDURE — 25000003 PHARM REV CODE 250: Mod: PN | Performed by: INTERNAL MEDICINE

## 2020-07-29 PROCEDURE — 96366 THER/PROPH/DIAG IV INF ADDON: CPT | Mod: PN

## 2020-07-29 PROCEDURE — 96409 CHEMO IV PUSH SNGL DRUG: CPT | Mod: PN

## 2020-07-29 PROCEDURE — 96375 TX/PRO/DX INJ NEW DRUG ADDON: CPT | Mod: PN

## 2020-07-29 PROCEDURE — 63600175 PHARM REV CODE 636 W HCPCS: Mod: TB,PN | Performed by: INTERNAL MEDICINE

## 2020-07-29 RX ORDER — HEPARIN 100 UNIT/ML
500 SYRINGE INTRAVENOUS
Status: DISCONTINUED | OUTPATIENT
Start: 2020-07-29 | End: 2020-07-29 | Stop reason: HOSPADM

## 2020-07-29 RX ORDER — FLUOROURACIL 50 MG/ML
400 INJECTION, SOLUTION INTRAVENOUS
Status: COMPLETED | OUTPATIENT
Start: 2020-07-29 | End: 2020-07-29

## 2020-07-29 RX ORDER — EPINEPHRINE 0.3 MG/.3ML
0.3 INJECTION SUBCUTANEOUS ONCE AS NEEDED
Status: DISCONTINUED | OUTPATIENT
Start: 2020-07-29 | End: 2020-07-29 | Stop reason: HOSPADM

## 2020-07-29 RX ORDER — DIPHENHYDRAMINE HYDROCHLORIDE 50 MG/ML
50 INJECTION INTRAMUSCULAR; INTRAVENOUS ONCE AS NEEDED
Status: DISCONTINUED | OUTPATIENT
Start: 2020-07-29 | End: 2020-07-29 | Stop reason: HOSPADM

## 2020-07-29 RX ORDER — SODIUM CHLORIDE 0.9 % (FLUSH) 0.9 %
10 SYRINGE (ML) INJECTION
Status: DISCONTINUED | OUTPATIENT
Start: 2020-07-29 | End: 2020-07-29 | Stop reason: HOSPADM

## 2020-07-29 RX ORDER — DIPHENHYDRAMINE HYDROCHLORIDE 50 MG/ML
25 INJECTION INTRAMUSCULAR; INTRAVENOUS
Status: COMPLETED | OUTPATIENT
Start: 2020-07-29 | End: 2020-07-29

## 2020-07-29 RX ADMIN — FLUOROURACIL 5665 MG: 50 INJECTION, SOLUTION INTRAVENOUS at 11:07

## 2020-07-29 RX ADMIN — FLUOROURACIL 945 MG: 50 INJECTION, SOLUTION INTRAVENOUS at 11:07

## 2020-07-29 RX ADMIN — DEXAMETHASONE SODIUM PHOSPHATE: 4 INJECTION, SOLUTION INTRA-ARTICULAR; INTRALESIONAL; INTRAMUSCULAR; INTRAVENOUS; SOFT TISSUE at 08:07

## 2020-07-29 RX ADMIN — DIPHENHYDRAMINE HYDROCHLORIDE 25 MG: 50 INJECTION INTRAMUSCULAR; INTRAVENOUS at 08:07

## 2020-07-29 RX ADMIN — SODIUM CHLORIDE: 9 INJECTION, SOLUTION INTRAVENOUS at 08:07

## 2020-07-29 RX ADMIN — LEUCOVORIN CALCIUM 945 MG: 350 INJECTION, POWDER, LYOPHILIZED, FOR SOLUTION INTRAMUSCULAR; INTRAVENOUS at 09:07

## 2020-07-29 RX ADMIN — APREPITANT 130 MG: 130 INJECTION, EMULSION INTRAVENOUS at 09:07

## 2020-07-29 NOTE — PLAN OF CARE
Problem: Adult Inpatient Plan of Care  Goal: Patient-Specific Goal (Individualization)  Outcome: Ongoing, Progressing  Flowsheets (Taken 7/29/2020 0930)  Individualized Care Needs: warm blanket, recliner  Anxieties, Fears or Concerns: side effects  Patient-Specific Goals (Include Timeframe): INFECTION PREVENTION DURING TX

## 2020-07-29 NOTE — TELEPHONE ENCOUNTER
----- Message from Princess LEORA Jamison sent at 7/21/2020 12:01 PM CDT -----  Regarding: return call  Contact: pt  Type:  Patient Returning Call    Who Called:  patient   Who Left Message for Patient:  Nurse or Dr. Dove  Does the patient know what this is regarding?:  yes  Best Call Back Number:  [phone] (home)     Additional Information:

## 2020-07-29 NOTE — PLAN OF CARE
Problem: Fatigue (Oncology Care)  Goal: Improved Activity Tolerance  Intervention: Promote Energy Conservation  Flowsheets (Taken 7/29/2020 7589)  Fatigue Management: frequent rest breaks encouraged  Sleep/Rest Enhancement: regular sleep/rest pattern promoted

## 2020-07-31 ENCOUNTER — INFUSION (OUTPATIENT)
Dept: INFUSION THERAPY | Facility: HOSPITAL | Age: 50
End: 2020-07-31
Attending: INTERNAL MEDICINE
Payer: COMMERCIAL

## 2020-07-31 VITALS
RESPIRATION RATE: 20 BRPM | DIASTOLIC BLOOD PRESSURE: 78 MMHG | SYSTOLIC BLOOD PRESSURE: 130 MMHG | HEART RATE: 95 BPM | TEMPERATURE: 98 F

## 2020-07-31 DIAGNOSIS — C77.2 METASTASIS TO INTESTINAL LYMPH NODE: Primary | ICD-10-CM

## 2020-07-31 DIAGNOSIS — C18.7 MALIGNANT NEOPLASM OF SIGMOID COLON: ICD-10-CM

## 2020-07-31 PROCEDURE — 63600175 PHARM REV CODE 636 W HCPCS: Mod: PN | Performed by: INTERNAL MEDICINE

## 2020-07-31 PROCEDURE — 96523 IRRIG DRUG DELIVERY DEVICE: CPT | Mod: PN

## 2020-07-31 PROCEDURE — A4216 STERILE WATER/SALINE, 10 ML: HCPCS | Mod: PN | Performed by: INTERNAL MEDICINE

## 2020-07-31 PROCEDURE — 25000003 PHARM REV CODE 250: Mod: PN | Performed by: INTERNAL MEDICINE

## 2020-07-31 RX ORDER — HEPARIN 100 UNIT/ML
500 SYRINGE INTRAVENOUS
Status: DISCONTINUED | OUTPATIENT
Start: 2020-07-31 | End: 2020-07-31 | Stop reason: HOSPADM

## 2020-07-31 RX ORDER — SODIUM CHLORIDE 0.9 % (FLUSH) 0.9 %
10 SYRINGE (ML) INJECTION
Status: DISCONTINUED | OUTPATIENT
Start: 2020-07-31 | End: 2020-07-31 | Stop reason: HOSPADM

## 2020-07-31 RX ADMIN — Medication 10 ML: at 09:07

## 2020-07-31 RX ADMIN — HEPARIN 500 UNITS: 100 SYRINGE at 09:07

## 2020-08-06 ENCOUNTER — TELEPHONE (OUTPATIENT)
Dept: INFUSION THERAPY | Facility: HOSPITAL | Age: 50
End: 2020-08-06

## 2020-08-10 ENCOUNTER — LAB VISIT (OUTPATIENT)
Dept: LAB | Facility: HOSPITAL | Age: 50
End: 2020-08-10
Attending: INTERNAL MEDICINE
Payer: COMMERCIAL

## 2020-08-10 ENCOUNTER — OFFICE VISIT (OUTPATIENT)
Dept: HEMATOLOGY/ONCOLOGY | Facility: CLINIC | Age: 50
End: 2020-08-10
Payer: COMMERCIAL

## 2020-08-10 ENCOUNTER — TELEPHONE (OUTPATIENT)
Dept: HEMATOLOGY/ONCOLOGY | Facility: CLINIC | Age: 50
End: 2020-08-10

## 2020-08-10 VITALS
SYSTOLIC BLOOD PRESSURE: 136 MMHG | DIASTOLIC BLOOD PRESSURE: 95 MMHG | BODY MASS INDEX: 42.84 KG/M2 | HEIGHT: 66 IN | WEIGHT: 266.56 LBS | OXYGEN SATURATION: 99 % | TEMPERATURE: 97 F | HEART RATE: 94 BPM

## 2020-08-10 DIAGNOSIS — C18.7 MALIGNANT NEOPLASM OF SIGMOID COLON: Primary | ICD-10-CM

## 2020-08-10 DIAGNOSIS — C77.2 METASTASIS TO INTESTINAL LYMPH NODE: ICD-10-CM

## 2020-08-10 DIAGNOSIS — C18.7 MALIGNANT NEOPLASM OF SIGMOID COLON: ICD-10-CM

## 2020-08-10 LAB
ALBUMIN SERPL BCP-MCNC: 4.2 G/DL (ref 3.5–5.2)
ALP SERPL-CCNC: 152 U/L (ref 38–145)
ALT SERPL W/O P-5'-P-CCNC: 24 U/L (ref 0–35)
ANION GAP SERPL CALC-SCNC: 5 MMOL/L (ref 8–16)
AST SERPL-CCNC: 32 U/L (ref 14–36)
BASOPHILS # BLD AUTO: 0.03 K/UL (ref 0–0.2)
BASOPHILS NFR BLD: 0.7 % (ref 0–1.9)
BILIRUB SERPL-MCNC: 0.5 MG/DL (ref 0.2–1.3)
BUN SERPL-MCNC: 14 MG/DL (ref 7–18)
CALCIUM SERPL-MCNC: 10.8 MG/DL (ref 8.4–10.2)
CEA SERPL-MCNC: 0.8 NG/ML (ref 0–5)
CHLORIDE SERPL-SCNC: 109 MMOL/L (ref 95–110)
CO2 SERPL-SCNC: 24 MMOL/L (ref 22–31)
CREAT SERPL-MCNC: 0.81 MG/DL (ref 0.5–1.4)
DIFFERENTIAL METHOD: ABNORMAL
EOSINOPHIL # BLD AUTO: 0.3 K/UL (ref 0–0.5)
EOSINOPHIL NFR BLD: 6.5 % (ref 0–8)
ERYTHROCYTE [DISTWIDTH] IN BLOOD BY AUTOMATED COUNT: 21.3 % (ref 11.5–14.5)
EST. GFR  (AFRICAN AMERICAN): >60 ML/MIN/1.73 M^2
EST. GFR  (NON AFRICAN AMERICAN): >60 ML/MIN/1.73 M^2
GLUCOSE SERPL-MCNC: 106 MG/DL (ref 70–110)
HCT VFR BLD AUTO: 36.2 % (ref 37–48.5)
HGB BLD-MCNC: 10.7 G/DL (ref 12–16)
IMM GRANULOCYTES # BLD AUTO: 0 K/UL (ref 0–0.04)
IMM GRANULOCYTES NFR BLD AUTO: 0 % (ref 0–0.5)
LYMPHOCYTES # BLD AUTO: 1.7 K/UL (ref 1–4.8)
LYMPHOCYTES NFR BLD: 40.1 % (ref 18–48)
MAGNESIUM SERPL-MCNC: 2 MG/DL (ref 1.6–2.6)
MCH RBC QN AUTO: 23.7 PG (ref 27–31)
MCHC RBC AUTO-ENTMCNC: 29.6 G/DL (ref 32–36)
MCV RBC AUTO: 80 FL (ref 82–98)
MONOCYTES # BLD AUTO: 0.5 K/UL (ref 0.3–1)
MONOCYTES NFR BLD: 11.5 % (ref 4–15)
NEUTROPHILS # BLD AUTO: 1.7 K/UL (ref 1.8–7.7)
NEUTROPHILS NFR BLD: 41.2 % (ref 38–73)
NRBC BLD-RTO: 0 /100 WBC
PLATELET # BLD AUTO: 355 K/UL (ref 150–350)
PMV BLD AUTO: 9.4 FL (ref 9.2–12.9)
POTASSIUM SERPL-SCNC: 4.1 MMOL/L (ref 3.5–5.1)
PROT SERPL-MCNC: 7.2 G/DL (ref 6–8.4)
RBC # BLD AUTO: 4.51 M/UL (ref 4–5.4)
SODIUM SERPL-SCNC: 138 MMOL/L (ref 136–145)
WBC # BLD AUTO: 4.16 K/UL (ref 3.9–12.7)

## 2020-08-10 PROCEDURE — 36415 COLL VENOUS BLD VENIPUNCTURE: CPT | Mod: PN

## 2020-08-10 PROCEDURE — 3008F BODY MASS INDEX DOCD: CPT | Mod: CPTII,S$GLB,, | Performed by: INTERNAL MEDICINE

## 2020-08-10 PROCEDURE — 99214 OFFICE O/P EST MOD 30 MIN: CPT | Mod: S$GLB,,, | Performed by: INTERNAL MEDICINE

## 2020-08-10 PROCEDURE — 3080F DIAST BP >= 90 MM HG: CPT | Mod: CPTII,S$GLB,, | Performed by: INTERNAL MEDICINE

## 2020-08-10 PROCEDURE — 3075F PR MOST RECENT SYSTOLIC BLOOD PRESS GE 130-139MM HG: ICD-10-PCS | Mod: CPTII,S$GLB,, | Performed by: INTERNAL MEDICINE

## 2020-08-10 PROCEDURE — 80053 COMPREHEN METABOLIC PANEL: CPT

## 2020-08-10 PROCEDURE — 3080F PR MOST RECENT DIASTOLIC BLOOD PRESSURE >= 90 MM HG: ICD-10-PCS | Mod: CPTII,S$GLB,, | Performed by: INTERNAL MEDICINE

## 2020-08-10 PROCEDURE — 99214 PR OFFICE/OUTPT VISIT, EST, LEVL IV, 30-39 MIN: ICD-10-PCS | Mod: S$GLB,,, | Performed by: INTERNAL MEDICINE

## 2020-08-10 PROCEDURE — 3008F PR BODY MASS INDEX (BMI) DOCUMENTED: ICD-10-PCS | Mod: CPTII,S$GLB,, | Performed by: INTERNAL MEDICINE

## 2020-08-10 PROCEDURE — 83735 ASSAY OF MAGNESIUM: CPT | Mod: PN

## 2020-08-10 PROCEDURE — 82378 CARCINOEMBRYONIC ANTIGEN: CPT | Mod: PN

## 2020-08-10 PROCEDURE — 3075F SYST BP GE 130 - 139MM HG: CPT | Mod: CPTII,S$GLB,, | Performed by: INTERNAL MEDICINE

## 2020-08-10 PROCEDURE — 99999 PR PBB SHADOW E&M-EST. PATIENT-LVL IV: CPT | Mod: PBBFAC,,, | Performed by: INTERNAL MEDICINE

## 2020-08-10 PROCEDURE — 85025 COMPLETE CBC W/AUTO DIFF WBC: CPT

## 2020-08-10 PROCEDURE — 99999 PR PBB SHADOW E&M-EST. PATIENT-LVL IV: ICD-10-PCS | Mod: PBBFAC,,, | Performed by: INTERNAL MEDICINE

## 2020-08-10 PROCEDURE — 83735 ASSAY OF MAGNESIUM: CPT

## 2020-08-10 PROCEDURE — 85025 COMPLETE CBC W/AUTO DIFF WBC: CPT | Mod: PN

## 2020-08-10 PROCEDURE — 82378 CARCINOEMBRYONIC ANTIGEN: CPT

## 2020-08-10 PROCEDURE — 80053 COMPREHEN METABOLIC PANEL: CPT | Mod: PN

## 2020-08-10 RX ORDER — FLUOROURACIL 50 MG/ML
400 INJECTION, SOLUTION INTRAVENOUS
Status: CANCELLED | OUTPATIENT
Start: 2020-08-12

## 2020-08-10 RX ORDER — SODIUM CHLORIDE 0.9 % (FLUSH) 0.9 %
10 SYRINGE (ML) INJECTION
Status: CANCELLED | OUTPATIENT
Start: 2020-08-12

## 2020-08-10 RX ORDER — DIPHENHYDRAMINE HYDROCHLORIDE 50 MG/ML
25 INJECTION INTRAMUSCULAR; INTRAVENOUS
Status: CANCELLED
Start: 2020-08-12

## 2020-08-10 RX ORDER — SODIUM CHLORIDE 0.9 % (FLUSH) 0.9 %
10 SYRINGE (ML) INJECTION
Status: CANCELLED | OUTPATIENT
Start: 2020-08-14

## 2020-08-10 RX ORDER — HEPARIN 100 UNIT/ML
500 SYRINGE INTRAVENOUS
Status: CANCELLED | OUTPATIENT
Start: 2020-08-12

## 2020-08-10 RX ORDER — DIPHENHYDRAMINE HYDROCHLORIDE 50 MG/ML
50 INJECTION INTRAMUSCULAR; INTRAVENOUS ONCE AS NEEDED
Status: CANCELLED | OUTPATIENT
Start: 2020-08-12

## 2020-08-10 RX ORDER — EPINEPHRINE 0.3 MG/.3ML
0.3 INJECTION SUBCUTANEOUS ONCE AS NEEDED
Status: CANCELLED | OUTPATIENT
Start: 2020-08-12

## 2020-08-10 RX ORDER — HEPARIN 100 UNIT/ML
500 SYRINGE INTRAVENOUS
Status: CANCELLED | OUTPATIENT
Start: 2020-08-14

## 2020-08-10 NOTE — PROGRESS NOTES
Subjective:       Patient ID: Gail Mckinnon is a 50 y.o. female.    Chief Complaint: Colon cancer      History:  Past Medical History:   Diagnosis Date    Anxiety     Depression     FH: ovarian cancer 3/16/2020    Hx of psychiatric care     Effexor, Paxil, Lexapro, Zoloft, Wellbutrin, Trazodone Buspar    Hyperthyroidism     Hypothyroid     Kidney calculi     Malignant neoplasm of sigmoid colon 3/16/2020    Menorrhagia     Multinodular goiter 2012    Palpitation     Psychiatric problem     Venous insufficiency      Past Surgical History:   Procedure Laterality Date     SECTION, CLASSIC      x3    COLONOSCOPY N/A 2020    Procedure: COLONOSCOPY;  Surgeon: Shane Parker MD;  Location: [facility];  Service: Endoscopy;  Laterality: N/A;    CYSTOSCOPY W/ URETERAL STENT PLACEMENT Bilateral 3/25/2020    Procedure: CYSTOSCOPY, WITH URETERAL STENT INSERTION;  Surgeon: Claudio Tyson MD;  Location: [facility] OR [address] [place] [address];  Service: Urology;  Laterality: Bilateral;    INSERTION OF TUNNELED CENTRAL VENOUS CATHETER (CVC) WITH SUBCUTANEOUS PORT N/A 2020    Procedure: [id number];  Surgeon: [facility] Provider;  Location: [facility] OR [address] [place] [address];  Service: Radiology;  Laterality: N/A;  Room 189/Cindy    LAPAROSCOPIC SIGMOIDECTOMY N/A 3/25/2020    Procedure: COLECTOMY, SIGMOID, LAPAROSCOPIC, flex sig, ERAS high;  Surgeon: Silvio Man MD;  Location: [facility] OR [address] [place] [address];  Service: Colon and Rectal;  Laterality: N/A;    MOBILIZATION OF SPLENIC FLEXURE  3/25/2020    Procedure: MOBILIZATION, SPLENIC FLEXURE;  Surgeon: Silvio Man MD;  Location: [facility] OR [address] [place] [address];  Service: Colon and Rectal;;    tonsillectomy      TOTAL ABDOMINAL HYSTERECTOMY W/ BILATERAL SALPINGOOPHORECTOMY N/A 3/25/2020    Procedure: HYSTERECTOMY, TOTAL, ABDOMINAL, WITH BILATERAL SALPINGO-OOPHORECTOMY;  Surgeon: Keron Brady MD;  Location: [facility] OR [address] [place] [address];  Service: Oncology;  Laterality: N/A;       Oncology History   Malignant neoplasm of sigmoid colon   3/16/2020 Initial Diagnosis    Malignant neoplasm of sigmoid colon     3/31/2020 Cancer Staged    Staging form: Colon and Rectum, AJCC 8th Edition  - Clinical stage from 3/31/2020: Stage IIIC (cT4b, cN2a, cM0)     5/6/2020 -  Chemotherapy    Treatment Summary   Plan Name: OP FOLFOX 6 Q2W  Treatment Goal: Curative  Status: Active  Start Date: 5/6/2020  End Date: 10/9/2020 (Planned)  Provider: MAXWELL Dove MD  Chemotherapy: fluorouraciL injection 945 mg, 400 mg/m2 = 945 mg, Intravenous, Clinic/HOD 1 time, 7 of 12 cycles  Administration: 945 mg (5/6/2020), 945 mg (5/20/2020), 945 mg (6/3/2020), 945 mg (6/17/2020), 945 mg (7/29/2020)  fluorouraciL 2,400 mg/m2 = 5,665 mg in sodium chloride 0.9% 240 mL chemo infusion, 2,400 mg/m2 = 5,665 mg, Intravenous, Over 46 hours, 7 of 12 cycles  Administration: 5,665 mg (5/6/2020), 5,665 mg (5/20/2020), 5,665 mg (6/3/2020), 5,665 mg (6/17/2020), 5,665 mg (7/29/2020)  leucovorin calcium 900 mg in dextrose 5 % 250 mL infusion, 945 mg, Intravenous, Clinic/HOD 1 time, 7 of 12 cycles  Administration: 900 mg (5/6/2020), 900 mg (5/20/2020), 900 mg (6/3/2020), 900 mg (6/17/2020), 945 mg (6/30/2020), 945 mg (7/20/2020), 945 mg (7/29/2020)  oxaliplatin (ELOXATIN) 200 mg in dextrose 5 % 500 mL chemo infusion, 201 mg, Intravenous, Clinic/HOD 1 time, 6 of 6 cycles  Dose modification: 65 mg/m2 (original dose 85 mg/m2, Cycle 6)  Administration: 200 mg (5/6/2020), 200 mg (5/20/2020), 200 mg (6/3/2020), 200 mg (6/17/2020), 201 mg (6/30/2020), 150 mg (7/20/2020)     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   4/3/2020 Initial Diagnosis    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     5/6/2020 -  Chemotherapy    Treatment Summary   Plan Name: OP FOLFOX 6 Q2W  Treatment Goal: Curative  Status: Active  Start Date: 5/6/2020  End Date: 10/9/2020 (Planned)  Provider: MAXWELL Dove MD  Chemotherapy: fluorouraciL injection 945 mg, 400 mg/m2 =  945 mg, Intravenous, Clinic/HOD 1 time, 3 of 12 cycles  Administration: 945 mg (5/6/2020), 945 mg (5/20/2020), 945 mg (6/3/2020)  fluorouraciL 2,400 mg/m2 = 5,665 mg in sodium chloride 0.9% 240 mL chemo infusion, 2,400 mg/m2 = 5,665 mg, Intravenous, Over 46 hours, 3 of 12 cycles  Administration: 5,665 mg (5/6/2020), 5,665 mg (5/20/2020), 5,665 mg (6/3/2020)  leucovorin calcium 900 mg in dextrose 5 % 250 mL infusion, 945 mg, Intravenous, Clinic/HOD 1 time, 3 of 12 cycles  Administration: 900 mg (5/6/2020), 900 mg (5/20/2020), 900 mg (6/3/2020)  oxaliplatin (ELOXATIN) 200 mg in dextrose 5 % 500 mL chemo infusion, 201 mg, Intravenous, Clinic/HOD 1 time, 3 of 12 cycles  Administration: 200 mg (5/6/2020), 200 mg (5/20/2020), 200 mg (6/3/2020)          Allergies:  Review of patient's allergies indicates:   Allergen Reactions    Penicillin g      unknown    Penicillins Hives     childhood allergy        Follow-up  Pertinent negatives include no abdominal pain, chest pain, chills, congestion, coughing, fatigue, fever, headaches, joint swelling, nausea, neck pain, numbness, rash, sore throat, vomiting or weakness.    50-year-old female who began having abdominal discomfort in June of 2019.  She was diagnosed with kidney stones and a CT scan showed some thickening in her colon.  She was then sent to see her PCP who recommended a colonoscopy.  The patient put this off until around January of 2020 when her PCP was adamant that she undergo further workup and she underwent colonoscopy in February of 2020.  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  The scans did not show obvious evidence of metastatic disease.  There was a small lesion in the liver that was concerning for a cyst in this will need to be followed closely.  She has  healed from her surgery and she did have 37 lymph nodes removed of which 4 positive for cancer.     She has essentially been through 7 cycles of chemotherapy however we did have to stop her 5th and 6th cycle secondary to an oxaliplatin reaction.      She continued to have problems with the oxaliplatin and is now on single agent fluorouracil.  Overall she feels well today.  She did recently go to the beach and did get a little sunburn but otherwise has no complaints.  She denies nausea or vomiting.  She denies fevers, chills or night sweats.  She denies any CNS type symptoms or abdominal discomfort.  Her CBC is almost within normal limits.    ECO  ECOG SCORE            Review of Systems   Constitutional: Negative for appetite change, chills, fatigue, fever and unexpected weight change.   HENT: Negative for congestion, facial swelling, mouth sores, sinus pressure, sinus pain, sore throat and trouble swallowing.    Eyes: Negative for photophobia, pain and visual disturbance.   Respiratory: Negative for cough, chest tightness, shortness of breath, wheezing and stridor.    Cardiovascular: Negative for chest pain and leg swelling.   Gastrointestinal: Negative for abdominal distention, abdominal pain, blood in stool, constipation, diarrhea, nausea, rectal pain and vomiting.   Genitourinary: Negative for dysuria, flank pain and urgency.   Musculoskeletal: Negative for back pain, gait problem, joint swelling and neck pain.   Skin: Negative for color change and rash.   Neurological: Negative for dizziness, syncope, weakness, light-headedness, numbness and headaches.   Hematological: Negative for adenopathy. Does not bruise/bleed easily.   Psychiatric/Behavioral: Negative for agitation, behavioral problems and confusion. The patient is not nervous/anxious.        Objective:      Physical Exam  Vitals signs reviewed.   Constitutional:       General: She is not in acute distress.     Appearance: She is well-developed. She is  not diaphoretic.   HENT:      Head: Normocephalic.      Mouth/Throat:      Mouth: Mucous membranes are moist.   Eyes:      General: No scleral icterus.     Extraocular Movements: Extraocular movements intact.      Pupils: Pupils are equal, round, and reactive to light.   Neck:      Musculoskeletal: Normal range of motion and neck supple. No neck rigidity.      Trachea: No tracheal deviation.   Cardiovascular:      Rate and Rhythm: Normal rate.   Pulmonary:      Effort: Pulmonary effort is normal. No respiratory distress.      Breath sounds: Normal breath sounds. No stridor. No wheezing.   Abdominal:      General: Bowel sounds are normal. There is no distension.      Palpations: Abdomen is soft. There is no mass.      Tenderness: There is no abdominal tenderness.      Comments: Protuberant   Musculoskeletal:         General: No deformity.   Skin:     Findings: No rash.   Neurological:      General: No focal deficit present.      Mental Status: She is alert and oriented to person, place, and time.      Cranial Nerves: No cranial nerve deficit.      Sensory: No sensory deficit.   Psychiatric:         Behavior: Behavior normal.         Thought Content: Thought content normal.         Judgment: Judgment normal.         Vitals:    08/10/20 1119   BP: (!) 136/95   Pulse: 94   Temp: 97.1 °F (36.2 °C)         Results:     Lab Visit on 08/10/2020   Component Date Value Ref Range Status    WBC 08/10/2020 4.16  3.90 - 12.70 K/uL Final    RBC 08/10/2020 4.51  4.00 - 5.40 M/uL Final    Hemoglobin 08/10/2020 10.7* 12.0 - 16.0 g/dL Final    Hematocrit 08/10/2020 36.2* 37.0 - 48.5 % Final    Mean Corpuscular Volume 08/10/2020 80* 82 - 98 fL Final    Mean Corpuscular Hemoglobin 08/10/2020 23.7* 27.0 - 31.0 pg Final    Mean Corpuscular Hemoglobin Conc 08/10/2020 29.6* 32.0 - 36.0 g/dL Final    RDW 08/10/2020 21.3* 11.5 - 14.5 % Final    Platelets 08/10/2020 355* 150 - 350 K/uL Final    MPV 08/10/2020 9.4  9.2 - 12.9 fL Final     Immature Granulocytes 08/10/2020 0.0  0.0 - 0.5 % Final    Gran # (ANC) 08/10/2020 1.7* 1.8 - 7.7 K/uL Final    Immature Grans (Abs) 08/10/2020 0.00  0.00 - 0.04 K/uL Final    Comment: Mild elevation in immature granulocytes is non specific and   can be seen in a variety of conditions including stress response,   acute inflammation, trauma and pregnancy. Correlation with other   laboratory and clinical findings is essential.      Lymph # 08/10/2020 1.7  1.0 - 4.8 K/uL Final    Mono # 08/10/2020 0.5  0.3 - 1.0 K/uL Final    Eos # 08/10/2020 0.3  0.0 - 0.5 K/uL Final    Baso # 08/10/2020 0.03  0.00 - 0.20 K/uL Final    nRBC 08/10/2020 0  0 /100 WBC Final    Gran% 08/10/2020 41.2  38.0 - 73.0 % Final    Lymph% 08/10/2020 40.1  18.0 - 48.0 % Final    Mono% 08/10/2020 11.5  4.0 - 15.0 % Final    Eosinophil% 08/10/2020 6.5  0.0 - 8.0 % Final    Basophil% 08/10/2020 0.7  0.0 - 1.9 % Final    Differential Method 08/10/2020 Automated   Final    Magnesium 08/10/2020 2.0  1.6 - 2.6 mg/dL Final    Sodium 08/10/2020 138  136 - 145 mmol/L Final    Potassium 08/10/2020 4.1  3.5 - 5.1 mmol/L Final    Chloride 08/10/2020 109  95 - 110 mmol/L Final    CO2 08/10/2020 24  22 - 31 mmol/L Final    Glucose 08/10/2020 106  70 - 110 mg/dL Final    Comment: The ADA recommends the following guidelines for fasting glucose:  Normal:       less than 100 mg/dL  Prediabetes:  100 mg/dL to 125 mg/dL  Diabetes:     126 mg/dL or higher      BUN, Bld 08/10/2020 14  7 - 18 mg/dL Final    Creatinine 08/10/2020 0.81  0.50 - 1.40 mg/dL Final    Calcium 08/10/2020 10.8* 8.4 - 10.2 mg/dL Final    Total Protein 08/10/2020 7.2  6.0 - 8.4 g/dL Final    Albumin 08/10/2020 4.2  3.5 - 5.2 g/dL Final    Total Bilirubin 08/10/2020 0.5  0.2 - 1.3 mg/dL Final    Alkaline Phosphatase 08/10/2020 152* 38 - 145 U/L Final    AST 08/10/2020 32  14 - 36 U/L Final    ALT 08/10/2020 24  0 - 35 U/L Final    Anion Gap 08/10/2020 5* 8 - 16 mmol/L  Final    eGFR if African American 08/10/2020 >60  >60 mL/min/1.73 m^2 Final    eGFR if non African American 08/10/2020 >60  >60 mL/min/1.73 m^2 Final    Comment: Calculation used to obtain the estimated glomerular filtration  rate (eGFR) is the CKD-EPI equation.       CEA 08/10/2020 0.8  0.0 - 5.0 ng/mL Final    Comment: [employer] immunometric chemiluminescent   methodology is used. Results obtained with different assay   methods cannot be used interchangeably.    Elevated concentrations of CEA may be found in smokers or in   patients with nonmalignant conditions. CEA concentrations,   regardless of level, should not be interpreted as absolute   evidence of the presence or absence of malignant disease.   The [employer] CEA assay is not recommended as a screening procedure   for cancer detection.   Patients taking very high Biotin doses of >300 mcg/day may   cause a negative bias in this assay.          Assessment:     1. Malignant neoplasm of sigmoid colon    2. 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 Psychophysiological insomnia    4. Anxiety    5. Other constipation    6. Nausea    7.     Chemotherapy adverse reaction  Plan:   Plan: COVID-19 test pending    1,2,6.  Stage III colon cancer:   Cycle 5 and 6 were not completed in total.  I did talk to her extensively today regarding her chemotherapy and we are going to proceed with single agent fluorouracil for now.  I did discuss FOLFIRI with her as well as observation and capecitabine however after the discussion she wishes to continue with single agent infusional fluorouracil.  We did talk about the risk versus benefits of this.  She will present on Wednesday of this week for her next cycle of fluorouracil and then in 2 weeks with labs and treatment only and I will see her back in a month with labs for clearance for chemotherapy however she is aware to contact me with any temperature over 100.4° F or any new problems and I will certainly see her  sooner.     3,4.  Anxiety and insomnia:   She has been on BuSpar the past.  She has also met with our psychologist.  Overall she is tolerating this well and we will continue to monitor closely.  Her Wellbutrin was increased to b.i.d. recently.     5.  Constipation: She will continue to take a stool softener daily and continue the MiraLax as needed.  She is aware to contact us with any problems or worsening symptoms will certainly see her sooner.      Pt displayed understanding of the above encounter and treatment plan. All thoughtful questions were answered to their satisfaction. Pte was advised to notify the care team or proceed to the ER if signs and symptoms worsen.

## 2020-08-10 NOTE — TELEPHONE ENCOUNTER
----- Message from MAXWELL Dove MD sent at 8/10/2020 12:07 PM CDT -----  Return in two weeks with labs and chemo but does not need to see me.  See me in four weeks with labs and chemo.

## 2020-08-10 NOTE — Clinical Note
Return in two weeks with labs and chemo but does not need to see me.  See me in four weeks with labs and chemo.

## 2020-08-10 NOTE — TELEPHONE ENCOUNTER
4 wk appt falls on 9/7/2020, Labor Day, office is closed.  Per Dr. Dove, schedule for Thursday 9/3/2020.  Appt scheduled and sent to pt.

## 2020-08-11 ENCOUNTER — TELEPHONE (OUTPATIENT)
Dept: INFUSION THERAPY | Facility: HOSPITAL | Age: 50
End: 2020-08-11

## 2020-08-12 ENCOUNTER — INFUSION (OUTPATIENT)
Dept: INFUSION THERAPY | Facility: HOSPITAL | Age: 50
End: 2020-08-12
Attending: INTERNAL MEDICINE
Payer: COMMERCIAL

## 2020-08-12 VITALS
DIASTOLIC BLOOD PRESSURE: 82 MMHG | TEMPERATURE: 98 F | RESPIRATION RATE: 18 BRPM | SYSTOLIC BLOOD PRESSURE: 130 MMHG | WEIGHT: 265.88 LBS | HEIGHT: 66 IN | HEART RATE: 79 BPM | BODY MASS INDEX: 42.73 KG/M2

## 2020-08-12 DIAGNOSIS — C77.2 METASTASIS TO INTESTINAL LYMPH NODE: Primary | ICD-10-CM

## 2020-08-12 DIAGNOSIS — C18.7 MALIGNANT NEOPLASM OF SIGMOID COLON: ICD-10-CM

## 2020-08-12 PROCEDURE — 96409 CHEMO IV PUSH SNGL DRUG: CPT | Mod: PN

## 2020-08-12 PROCEDURE — 63600175 PHARM REV CODE 636 W HCPCS: Mod: PN | Performed by: INTERNAL MEDICINE

## 2020-08-12 PROCEDURE — 25000003 PHARM REV CODE 250: Mod: PN | Performed by: INTERNAL MEDICINE

## 2020-08-12 PROCEDURE — 96375 TX/PRO/DX INJ NEW DRUG ADDON: CPT | Mod: PN

## 2020-08-12 PROCEDURE — 96367 TX/PROPH/DG ADDL SEQ IV INF: CPT | Mod: PN

## 2020-08-12 PROCEDURE — 96416 CHEMO PROLONG INFUSE W/PUMP: CPT | Mod: PN

## 2020-08-12 RX ORDER — FLUOROURACIL 50 MG/ML
400 INJECTION, SOLUTION INTRAVENOUS
Status: COMPLETED | OUTPATIENT
Start: 2020-08-12 | End: 2020-08-12

## 2020-08-12 RX ORDER — EPINEPHRINE 0.3 MG/.3ML
0.3 INJECTION SUBCUTANEOUS ONCE AS NEEDED
Status: DISCONTINUED | OUTPATIENT
Start: 2020-08-12 | End: 2020-08-12 | Stop reason: HOSPADM

## 2020-08-12 RX ORDER — SODIUM CHLORIDE 0.9 % (FLUSH) 0.9 %
10 SYRINGE (ML) INJECTION
Status: DISCONTINUED | OUTPATIENT
Start: 2020-08-12 | End: 2020-08-12 | Stop reason: HOSPADM

## 2020-08-12 RX ORDER — DIPHENHYDRAMINE HYDROCHLORIDE 50 MG/ML
25 INJECTION INTRAMUSCULAR; INTRAVENOUS
Status: COMPLETED | OUTPATIENT
Start: 2020-08-12 | End: 2020-08-12

## 2020-08-12 RX ORDER — DIPHENHYDRAMINE HYDROCHLORIDE 50 MG/ML
50 INJECTION INTRAMUSCULAR; INTRAVENOUS ONCE AS NEEDED
Status: DISCONTINUED | OUTPATIENT
Start: 2020-08-12 | End: 2020-08-12 | Stop reason: HOSPADM

## 2020-08-12 RX ADMIN — APREPITANT 130 MG: 130 INJECTION, EMULSION INTRAVENOUS at 12:08

## 2020-08-12 RX ADMIN — SODIUM CHLORIDE: 9 INJECTION, SOLUTION INTRAVENOUS at 11:08

## 2020-08-12 RX ADMIN — DEXAMETHASONE SODIUM PHOSPHATE: 4 INJECTION, SOLUTION INTRA-ARTICULAR; INTRALESIONAL; INTRAMUSCULAR; INTRAVENOUS; SOFT TISSUE at 12:08

## 2020-08-12 RX ADMIN — FLUOROURACIL 5665 MG: 50 INJECTION, SOLUTION INTRAVENOUS at 02:08

## 2020-08-12 RX ADMIN — LEUCOVORIN CALCIUM 945 MG: 350 INJECTION, POWDER, LYOPHILIZED, FOR SOLUTION INTRAMUSCULAR; INTRAVENOUS at 01:08

## 2020-08-12 RX ADMIN — DIPHENHYDRAMINE HYDROCHLORIDE 25 MG: 50 INJECTION INTRAMUSCULAR; INTRAVENOUS at 12:08

## 2020-08-12 RX ADMIN — FLUOROURACIL 945 MG: 50 INJECTION, SOLUTION INTRAVENOUS at 01:08

## 2020-08-12 NOTE — PLAN OF CARE
Problem: Adult Inpatient Plan of Care  Goal: Plan of Care Review  Flowsheets (Taken 8/12/2020 1413)  Plan of Care Reviewed With: patient     Pt tolerated infusion well.  No adverse reaction noted.  Pt education reinforced on potential side effects, what to expect, and when to call physician; we also reviewed patient's calendar and understanding verbalized.  Pt ambulated out of clinic in no acute distress.

## 2020-08-12 NOTE — PLAN OF CARE
Problem: Fatigue (Oncology Care)  Goal: Improved Activity Tolerance  Intervention: Promote Energy Conservation  Flowsheets (Taken 8/12/2020 1200)  Fatigue Management:   frequent rest breaks encouraged   paced activity encouraged  Sleep/Rest Enhancement:   awakenings minimized   noise level reduced   regular sleep/rest pattern promoted     Problem: Adult Inpatient Plan of Care  Goal: Patient-Specific Goal (Individualization)  Flowsheets (Taken 8/12/2020 1200)  Individualized Care Needs: warm blanket, recliner  Anxieties, Fears or Concerns: side effects  Patient-Specific Goals (Include Timeframe): Infection prevention during tx

## 2020-08-14 ENCOUNTER — INFUSION (OUTPATIENT)
Dept: INFUSION THERAPY | Facility: HOSPITAL | Age: 50
End: 2020-08-14
Attending: INTERNAL MEDICINE
Payer: COMMERCIAL

## 2020-08-14 VITALS
RESPIRATION RATE: 18 BRPM | HEIGHT: 66 IN | HEART RATE: 95 BPM | WEIGHT: 265.88 LBS | SYSTOLIC BLOOD PRESSURE: 124 MMHG | TEMPERATURE: 98 F | BODY MASS INDEX: 42.73 KG/M2 | DIASTOLIC BLOOD PRESSURE: 75 MMHG

## 2020-08-14 DIAGNOSIS — C77.2 METASTASIS TO INTESTINAL LYMPH NODE: Primary | ICD-10-CM

## 2020-08-14 DIAGNOSIS — C18.7 MALIGNANT NEOPLASM OF SIGMOID COLON: ICD-10-CM

## 2020-08-14 PROCEDURE — 96523 IRRIG DRUG DELIVERY DEVICE: CPT | Mod: PN

## 2020-08-14 PROCEDURE — A4216 STERILE WATER/SALINE, 10 ML: HCPCS | Mod: PN | Performed by: INTERNAL MEDICINE

## 2020-08-14 PROCEDURE — 25000003 PHARM REV CODE 250: Mod: PN | Performed by: INTERNAL MEDICINE

## 2020-08-14 PROCEDURE — 63600175 PHARM REV CODE 636 W HCPCS: Mod: PN | Performed by: INTERNAL MEDICINE

## 2020-08-14 RX ORDER — SODIUM CHLORIDE 0.9 % (FLUSH) 0.9 %
10 SYRINGE (ML) INJECTION
Status: DISCONTINUED | OUTPATIENT
Start: 2020-08-14 | End: 2020-08-14 | Stop reason: HOSPADM

## 2020-08-14 RX ORDER — HEPARIN 100 UNIT/ML
500 SYRINGE INTRAVENOUS
Status: DISCONTINUED | OUTPATIENT
Start: 2020-08-14 | End: 2020-08-14 | Stop reason: HOSPADM

## 2020-08-14 RX ADMIN — HEPARIN 500 UNITS: 100 SYRINGE at 01:08

## 2020-08-14 RX ADMIN — Medication 10 ML: at 01:08

## 2020-08-14 NOTE — PLAN OF CARE
Pt here for pump d/c, tolerated well, VSS, schedule reviewed with pt, ambulated from infusion center, no distress noted

## 2020-08-25 ENCOUNTER — LAB VISIT (OUTPATIENT)
Dept: LAB | Facility: HOSPITAL | Age: 50
End: 2020-08-25
Attending: INTERNAL MEDICINE
Payer: COMMERCIAL

## 2020-08-25 DIAGNOSIS — C77.2 METASTASIS TO INTESTINAL LYMPH NODE: ICD-10-CM

## 2020-08-25 DIAGNOSIS — C18.7 MALIGNANT NEOPLASM OF SIGMOID COLON: ICD-10-CM

## 2020-08-25 LAB
ALBUMIN SERPL BCP-MCNC: 4 G/DL (ref 3.5–5.2)
ALP SERPL-CCNC: 174 U/L (ref 55–135)
ALT SERPL W/O P-5'-P-CCNC: 21 U/L (ref 10–44)
ANION GAP SERPL CALC-SCNC: 10 MMOL/L (ref 8–16)
ANISOCYTOSIS BLD QL SMEAR: SLIGHT
AST SERPL-CCNC: 18 U/L (ref 10–40)
BASOPHILS # BLD AUTO: 0.04 K/UL (ref 0–0.2)
BASOPHILS NFR BLD: 0.8 % (ref 0–1.9)
BILIRUB SERPL-MCNC: 0.3 MG/DL (ref 0.1–1)
BUN SERPL-MCNC: 13 MG/DL (ref 6–20)
CALCIUM SERPL-MCNC: 10.9 MG/DL (ref 8.7–10.5)
CEA SERPL-MCNC: 1.3 NG/ML (ref 0–5)
CHLORIDE SERPL-SCNC: 109 MMOL/L (ref 95–110)
CO2 SERPL-SCNC: 23 MMOL/L (ref 23–29)
CREAT SERPL-MCNC: 0.9 MG/DL (ref 0.5–1.4)
DIFFERENTIAL METHOD: ABNORMAL
EOSINOPHIL # BLD AUTO: 0.3 K/UL (ref 0–0.5)
EOSINOPHIL NFR BLD: 6.1 % (ref 0–8)
ERYTHROCYTE [DISTWIDTH] IN BLOOD BY AUTOMATED COUNT: 24.1 % (ref 11.5–14.5)
EST. GFR  (AFRICAN AMERICAN): >60 ML/MIN/1.73 M^2
EST. GFR  (NON AFRICAN AMERICAN): >60 ML/MIN/1.73 M^2
GLUCOSE SERPL-MCNC: 93 MG/DL (ref 70–110)
HCT VFR BLD AUTO: 37.7 % (ref 37–48.5)
HGB BLD-MCNC: 11.7 G/DL (ref 12–16)
LYMPHOCYTES # BLD AUTO: 1.8 K/UL (ref 1–4.8)
LYMPHOCYTES NFR BLD: 36.4 % (ref 18–48)
MAGNESIUM SERPL-MCNC: 2.1 MG/DL (ref 1.6–2.6)
MCH RBC QN AUTO: 24.6 PG (ref 27–31)
MCHC RBC AUTO-ENTMCNC: 31 G/DL (ref 32–36)
MCV RBC AUTO: 79 FL (ref 82–98)
MONOCYTES # BLD AUTO: 0.7 K/UL (ref 0.3–1)
MONOCYTES NFR BLD: 14.1 % (ref 4–15)
NEUTROPHILS # BLD AUTO: 2.1 K/UL (ref 1.8–7.7)
NEUTROPHILS NFR BLD: 42.6 % (ref 38–73)
OVALOCYTES BLD QL SMEAR: ABNORMAL
PLATELET # BLD AUTO: 348 K/UL (ref 150–350)
PLATELET BLD QL SMEAR: ABNORMAL
PMV BLD AUTO: 10.3 FL (ref 9.2–12.9)
POIKILOCYTOSIS BLD QL SMEAR: SLIGHT
POTASSIUM SERPL-SCNC: 4.7 MMOL/L (ref 3.5–5.1)
PROT SERPL-MCNC: 7.2 G/DL (ref 6–8.4)
RBC # BLD AUTO: 4.75 M/UL (ref 4–5.4)
SODIUM SERPL-SCNC: 142 MMOL/L (ref 136–145)
WBC # BLD AUTO: 4.95 K/UL (ref 3.9–12.7)

## 2020-08-25 PROCEDURE — 85025 COMPLETE CBC W/AUTO DIFF WBC: CPT | Mod: PO

## 2020-08-25 PROCEDURE — 83735 ASSAY OF MAGNESIUM: CPT | Mod: PO

## 2020-08-25 PROCEDURE — 36415 COLL VENOUS BLD VENIPUNCTURE: CPT | Mod: PO

## 2020-08-25 PROCEDURE — 80053 COMPREHEN METABOLIC PANEL: CPT | Mod: PO

## 2020-08-25 PROCEDURE — 82378 CARCINOEMBRYONIC ANTIGEN: CPT

## 2020-08-26 ENCOUNTER — INFUSION (OUTPATIENT)
Dept: INFUSION THERAPY | Facility: HOSPITAL | Age: 50
End: 2020-08-26
Attending: INTERNAL MEDICINE
Payer: COMMERCIAL

## 2020-08-26 VITALS
DIASTOLIC BLOOD PRESSURE: 72 MMHG | BODY MASS INDEX: 43.29 KG/M2 | HEART RATE: 82 BPM | RESPIRATION RATE: 16 BRPM | TEMPERATURE: 99 F | HEIGHT: 66 IN | WEIGHT: 269.38 LBS | SYSTOLIC BLOOD PRESSURE: 121 MMHG

## 2020-08-26 DIAGNOSIS — C77.2 METASTASIS TO INTESTINAL LYMPH NODE: Primary | ICD-10-CM

## 2020-08-26 DIAGNOSIS — C18.7 MALIGNANT NEOPLASM OF SIGMOID COLON: ICD-10-CM

## 2020-08-26 PROCEDURE — 25000003 PHARM REV CODE 250: Mod: PN | Performed by: INTERNAL MEDICINE

## 2020-08-26 PROCEDURE — 63600175 PHARM REV CODE 636 W HCPCS: Mod: PN | Performed by: INTERNAL MEDICINE

## 2020-08-26 PROCEDURE — 96367 TX/PROPH/DG ADDL SEQ IV INF: CPT | Mod: PN

## 2020-08-26 PROCEDURE — 96416 CHEMO PROLONG INFUSE W/PUMP: CPT | Mod: PN

## 2020-08-26 PROCEDURE — 96375 TX/PRO/DX INJ NEW DRUG ADDON: CPT | Mod: PN

## 2020-08-26 PROCEDURE — 96409 CHEMO IV PUSH SNGL DRUG: CPT | Mod: PN

## 2020-08-26 RX ORDER — EPINEPHRINE 0.3 MG/.3ML
0.3 INJECTION SUBCUTANEOUS ONCE AS NEEDED
Status: CANCELLED | OUTPATIENT
Start: 2020-08-26

## 2020-08-26 RX ORDER — DIPHENHYDRAMINE HYDROCHLORIDE 50 MG/ML
50 INJECTION INTRAMUSCULAR; INTRAVENOUS ONCE AS NEEDED
Status: CANCELLED | OUTPATIENT
Start: 2020-08-26

## 2020-08-26 RX ORDER — SODIUM CHLORIDE 0.9 % (FLUSH) 0.9 %
10 SYRINGE (ML) INJECTION
Status: DISCONTINUED | OUTPATIENT
Start: 2020-08-26 | End: 2020-08-26 | Stop reason: HOSPADM

## 2020-08-26 RX ORDER — SODIUM CHLORIDE 0.9 % (FLUSH) 0.9 %
10 SYRINGE (ML) INJECTION
Status: CANCELLED | OUTPATIENT
Start: 2020-08-28

## 2020-08-26 RX ORDER — HEPARIN 100 UNIT/ML
500 SYRINGE INTRAVENOUS
Status: CANCELLED | OUTPATIENT
Start: 2020-08-28

## 2020-08-26 RX ORDER — HEPARIN 100 UNIT/ML
500 SYRINGE INTRAVENOUS
Status: CANCELLED | OUTPATIENT
Start: 2020-08-26

## 2020-08-26 RX ORDER — DIPHENHYDRAMINE HYDROCHLORIDE 50 MG/ML
25 INJECTION INTRAMUSCULAR; INTRAVENOUS
Status: DISCONTINUED | OUTPATIENT
Start: 2020-08-26 | End: 2020-08-26 | Stop reason: HOSPADM

## 2020-08-26 RX ORDER — SODIUM CHLORIDE 0.9 % (FLUSH) 0.9 %
10 SYRINGE (ML) INJECTION
Status: CANCELLED | OUTPATIENT
Start: 2020-08-26

## 2020-08-26 RX ORDER — DIPHENHYDRAMINE HYDROCHLORIDE 50 MG/ML
25 INJECTION INTRAMUSCULAR; INTRAVENOUS
Status: CANCELLED
Start: 2020-08-26

## 2020-08-26 RX ORDER — FLUOROURACIL 50 MG/ML
400 INJECTION, SOLUTION INTRAVENOUS
Status: CANCELLED | OUTPATIENT
Start: 2020-08-26

## 2020-08-26 RX ORDER — EPINEPHRINE 0.3 MG/.3ML
0.3 INJECTION SUBCUTANEOUS ONCE AS NEEDED
Status: DISCONTINUED | OUTPATIENT
Start: 2020-08-26 | End: 2020-08-26 | Stop reason: HOSPADM

## 2020-08-26 RX ORDER — FLUOROURACIL 50 MG/ML
400 INJECTION, SOLUTION INTRAVENOUS
Status: COMPLETED | OUTPATIENT
Start: 2020-08-26 | End: 2020-08-26

## 2020-08-26 RX ORDER — DIPHENHYDRAMINE HYDROCHLORIDE 50 MG/ML
50 INJECTION INTRAMUSCULAR; INTRAVENOUS ONCE AS NEEDED
Status: DISCONTINUED | OUTPATIENT
Start: 2020-08-26 | End: 2020-08-26 | Stop reason: HOSPADM

## 2020-08-26 RX ADMIN — FLUOROURACIL 945 MG: 50 INJECTION, SOLUTION INTRAVENOUS at 02:08

## 2020-08-26 RX ADMIN — LEUCOVORIN CALCIUM 945 MG: 350 INJECTION, POWDER, LYOPHILIZED, FOR SOLUTION INTRAMUSCULAR; INTRAVENOUS at 02:08

## 2020-08-26 RX ADMIN — SODIUM CHLORIDE: 9 INJECTION, SOLUTION INTRAVENOUS at 01:08

## 2020-08-26 RX ADMIN — DIPHENHYDRAMINE HYDROCHLORIDE 25 MG: 50 INJECTION, SOLUTION INTRAMUSCULAR; INTRAVENOUS at 01:08

## 2020-08-26 RX ADMIN — FLUOROURACIL 5665 MG: 50 INJECTION, SOLUTION INTRAVENOUS at 02:08

## 2020-08-26 RX ADMIN — APREPITANT 130 MG: 130 INJECTION, EMULSION INTRAVENOUS at 01:08

## 2020-08-26 RX ADMIN — DEXAMETHASONE SODIUM PHOSPHATE: 4 INJECTION, SOLUTION INTRA-ARTICULAR; INTRALESIONAL; INTRAMUSCULAR; INTRAVENOUS; SOFT TISSUE at 01:08

## 2020-08-26 NOTE — PLAN OF CARE
Problem: Adult Inpatient Plan of Care  Goal: Plan of Care Review  8/26/2020 1512 by Joellen Bashir RN  Outcome: Ongoing, Progressing  Flowsheets (Taken 8/26/2020 1512)  Plan of Care Reviewed With: patient      Pt tolerated FOLFOX well. No adverse reaction noted. Pt education reinforced on chemo regimen, side effects, what to expect, and when to call physician. Pt verbalized understanding. I reviewed pt calendar w/ pt and understanding verbalized. PAC remains accessed with 5FU infusing at 5.2cc/hr over 46 hours. Patent upon discharge.

## 2020-08-26 NOTE — PLAN OF CARE
Problem: Adult Inpatient Plan of Care  Goal: Patient-Specific Goal (Individualization)  Outcome: Ongoing, Progressing  Flowsheets (Taken 8/26/2020 1304)  Individualized Care Needs: warm blanket, recliner  Anxieties, Fears or Concerns: getting port accessed  Patient-Specific Goals (Include Timeframe): infection prevention during treatment     Problem: Fatigue (Oncology Care)  Goal: Improved Activity Tolerance  Outcome: Ongoing, Progressing  Intervention: Promote Energy Conservation  Flowsheets (Taken 8/26/2020 1304)  Fatigue Management: frequent rest breaks encouraged  Sleep/Rest Enhancement: regular sleep/rest pattern promoted  Activity Management:   ambulated in reyes - L4   up in chair - L3

## 2020-08-28 ENCOUNTER — INFUSION (OUTPATIENT)
Dept: INFUSION THERAPY | Facility: HOSPITAL | Age: 50
End: 2020-08-28
Attending: INTERNAL MEDICINE
Payer: COMMERCIAL

## 2020-08-28 VITALS
TEMPERATURE: 98 F | RESPIRATION RATE: 20 BRPM | DIASTOLIC BLOOD PRESSURE: 76 MMHG | SYSTOLIC BLOOD PRESSURE: 132 MMHG | HEART RATE: 78 BPM

## 2020-08-28 DIAGNOSIS — C77.2 METASTASIS TO INTESTINAL LYMPH NODE: Primary | ICD-10-CM

## 2020-08-28 DIAGNOSIS — C18.7 MALIGNANT NEOPLASM OF SIGMOID COLON: ICD-10-CM

## 2020-08-28 PROCEDURE — 96523 IRRIG DRUG DELIVERY DEVICE: CPT | Mod: PN

## 2020-08-28 PROCEDURE — 25000003 PHARM REV CODE 250: Mod: PN | Performed by: INTERNAL MEDICINE

## 2020-08-28 PROCEDURE — A4216 STERILE WATER/SALINE, 10 ML: HCPCS | Mod: PN | Performed by: INTERNAL MEDICINE

## 2020-08-28 PROCEDURE — 63600175 PHARM REV CODE 636 W HCPCS: Mod: PN | Performed by: INTERNAL MEDICINE

## 2020-08-28 RX ORDER — HEPARIN 100 UNIT/ML
500 SYRINGE INTRAVENOUS
Status: DISCONTINUED | OUTPATIENT
Start: 2020-08-28 | End: 2020-08-28 | Stop reason: HOSPADM

## 2020-08-28 RX ORDER — SODIUM CHLORIDE 0.9 % (FLUSH) 0.9 %
10 SYRINGE (ML) INJECTION
Status: DISCONTINUED | OUTPATIENT
Start: 2020-08-28 | End: 2020-08-28 | Stop reason: HOSPADM

## 2020-08-28 RX ADMIN — Medication 10 ML: at 12:08

## 2020-08-28 RX ADMIN — HEPARIN 500 UNITS: 100 SYRINGE at 12:08

## 2020-09-03 ENCOUNTER — OFFICE VISIT (OUTPATIENT)
Dept: HEMATOLOGY/ONCOLOGY | Facility: CLINIC | Age: 50
End: 2020-09-03
Payer: COMMERCIAL

## 2020-09-03 ENCOUNTER — LAB VISIT (OUTPATIENT)
Dept: LAB | Facility: HOSPITAL | Age: 50
End: 2020-09-03
Attending: INTERNAL MEDICINE
Payer: COMMERCIAL

## 2020-09-03 VITALS
BODY MASS INDEX: 43.22 KG/M2 | SYSTOLIC BLOOD PRESSURE: 124 MMHG | WEIGHT: 268.94 LBS | DIASTOLIC BLOOD PRESSURE: 71 MMHG | OXYGEN SATURATION: 100 % | HEIGHT: 66 IN | HEART RATE: 58 BPM | RESPIRATION RATE: 20 BRPM | TEMPERATURE: 98 F

## 2020-09-03 DIAGNOSIS — C18.7 MALIGNANT NEOPLASM OF SIGMOID COLON: Primary | ICD-10-CM

## 2020-09-03 DIAGNOSIS — C77.2 METASTASIS TO INTESTINAL LYMPH NODE: ICD-10-CM

## 2020-09-03 DIAGNOSIS — C18.7 MALIGNANT NEOPLASM OF SIGMOID COLON: ICD-10-CM

## 2020-09-03 LAB
ALBUMIN SERPL BCP-MCNC: 4.2 G/DL (ref 3.5–5.2)
ALP SERPL-CCNC: 151 U/L (ref 38–145)
ALT SERPL W/O P-5'-P-CCNC: 22 U/L (ref 0–35)
ANION GAP SERPL CALC-SCNC: 6 MMOL/L (ref 8–16)
AST SERPL-CCNC: 27 U/L (ref 14–36)
BASOPHILS # BLD AUTO: 0.03 K/UL (ref 0–0.2)
BASOPHILS NFR BLD: 0.5 % (ref 0–1.9)
BILIRUB SERPL-MCNC: 0.4 MG/DL (ref 0.2–1.3)
BUN SERPL-MCNC: 12 MG/DL (ref 7–18)
CALCIUM SERPL-MCNC: 10.7 MG/DL (ref 8.4–10.2)
CEA SERPL-MCNC: 0.8 NG/ML (ref 0–5)
CHLORIDE SERPL-SCNC: 107 MMOL/L (ref 95–110)
CO2 SERPL-SCNC: 26 MMOL/L (ref 22–31)
CREAT SERPL-MCNC: 0.75 MG/DL (ref 0.5–1.4)
DIFFERENTIAL METHOD: ABNORMAL
EOSINOPHIL # BLD AUTO: 0.4 K/UL (ref 0–0.5)
EOSINOPHIL NFR BLD: 7.4 % (ref 0–8)
ERYTHROCYTE [DISTWIDTH] IN BLOOD BY AUTOMATED COUNT: 22.6 % (ref 11.5–14.5)
EST. GFR  (AFRICAN AMERICAN): >60 ML/MIN/1.73 M^2
EST. GFR  (NON AFRICAN AMERICAN): >60 ML/MIN/1.73 M^2
GLUCOSE SERPL-MCNC: 98 MG/DL (ref 70–110)
HCT VFR BLD AUTO: 37.7 % (ref 37–48.5)
HGB BLD-MCNC: 11.3 G/DL (ref 12–16)
IMM GRANULOCYTES # BLD AUTO: 0.03 K/UL (ref 0–0.04)
IMM GRANULOCYTES NFR BLD AUTO: 0.5 % (ref 0–0.5)
LYMPHOCYTES # BLD AUTO: 2 K/UL (ref 1–4.8)
LYMPHOCYTES NFR BLD: 35.8 % (ref 18–48)
MAGNESIUM SERPL-MCNC: 2.1 MG/DL (ref 1.6–2.6)
MCH RBC QN AUTO: 25.1 PG (ref 27–31)
MCHC RBC AUTO-ENTMCNC: 30 G/DL (ref 32–36)
MCV RBC AUTO: 84 FL (ref 82–98)
MONOCYTES # BLD AUTO: 0.6 K/UL (ref 0.3–1)
MONOCYTES NFR BLD: 10.3 % (ref 4–15)
NEUTROPHILS # BLD AUTO: 2.5 K/UL (ref 1.8–7.7)
NEUTROPHILS NFR BLD: 45.5 % (ref 38–73)
NRBC BLD-RTO: 0 /100 WBC
PLATELET # BLD AUTO: 362 K/UL (ref 150–350)
PMV BLD AUTO: 9.8 FL (ref 9.2–12.9)
POTASSIUM SERPL-SCNC: 4 MMOL/L (ref 3.5–5.1)
PROT SERPL-MCNC: 7.3 G/DL (ref 6–8.4)
RBC # BLD AUTO: 4.51 M/UL (ref 4–5.4)
SODIUM SERPL-SCNC: 139 MMOL/L (ref 136–145)
WBC # BLD AUTO: 5.51 K/UL (ref 3.9–12.7)

## 2020-09-03 PROCEDURE — 80053 COMPREHEN METABOLIC PANEL: CPT

## 2020-09-03 PROCEDURE — 36415 COLL VENOUS BLD VENIPUNCTURE: CPT | Mod: PN

## 2020-09-03 PROCEDURE — 83735 ASSAY OF MAGNESIUM: CPT

## 2020-09-03 PROCEDURE — 85025 COMPLETE CBC W/AUTO DIFF WBC: CPT

## 2020-09-03 PROCEDURE — 99999 PR PBB SHADOW E&M-EST. PATIENT-LVL IV: CPT | Mod: PBBFAC,,, | Performed by: INTERNAL MEDICINE

## 2020-09-03 PROCEDURE — 80053 COMPREHEN METABOLIC PANEL: CPT | Mod: PN

## 2020-09-03 PROCEDURE — 82378 CARCINOEMBRYONIC ANTIGEN: CPT | Mod: PN

## 2020-09-03 PROCEDURE — 99214 PR OFFICE/OUTPT VISIT, EST, LEVL IV, 30-39 MIN: ICD-10-PCS | Mod: S$GLB,,, | Performed by: INTERNAL MEDICINE

## 2020-09-03 PROCEDURE — 82378 CARCINOEMBRYONIC ANTIGEN: CPT

## 2020-09-03 PROCEDURE — 3078F PR MOST RECENT DIASTOLIC BLOOD PRESSURE < 80 MM HG: ICD-10-PCS | Mod: CPTII,S$GLB,, | Performed by: INTERNAL MEDICINE

## 2020-09-03 PROCEDURE — 85025 COMPLETE CBC W/AUTO DIFF WBC: CPT | Mod: PN

## 2020-09-03 PROCEDURE — 99214 OFFICE O/P EST MOD 30 MIN: CPT | Mod: S$GLB,,, | Performed by: INTERNAL MEDICINE

## 2020-09-03 PROCEDURE — 3074F SYST BP LT 130 MM HG: CPT | Mod: CPTII,S$GLB,, | Performed by: INTERNAL MEDICINE

## 2020-09-03 PROCEDURE — 3008F BODY MASS INDEX DOCD: CPT | Mod: CPTII,S$GLB,, | Performed by: INTERNAL MEDICINE

## 2020-09-03 PROCEDURE — 83735 ASSAY OF MAGNESIUM: CPT | Mod: PN

## 2020-09-03 PROCEDURE — 99999 PR PBB SHADOW E&M-EST. PATIENT-LVL IV: ICD-10-PCS | Mod: PBBFAC,,, | Performed by: INTERNAL MEDICINE

## 2020-09-03 PROCEDURE — 3078F DIAST BP <80 MM HG: CPT | Mod: CPTII,S$GLB,, | Performed by: INTERNAL MEDICINE

## 2020-09-03 PROCEDURE — 3074F PR MOST RECENT SYSTOLIC BLOOD PRESSURE < 130 MM HG: ICD-10-PCS | Mod: CPTII,S$GLB,, | Performed by: INTERNAL MEDICINE

## 2020-09-03 PROCEDURE — 3008F PR BODY MASS INDEX (BMI) DOCUMENTED: ICD-10-PCS | Mod: CPTII,S$GLB,, | Performed by: INTERNAL MEDICINE

## 2020-09-03 RX ORDER — FLUOROURACIL 50 MG/ML
400 INJECTION, SOLUTION INTRAVENOUS
Status: CANCELLED | OUTPATIENT
Start: 2020-09-09

## 2020-09-03 RX ORDER — SODIUM CHLORIDE 0.9 % (FLUSH) 0.9 %
10 SYRINGE (ML) INJECTION
Status: CANCELLED | OUTPATIENT
Start: 2020-09-09

## 2020-09-03 RX ORDER — HEPARIN 100 UNIT/ML
500 SYRINGE INTRAVENOUS
Status: CANCELLED | OUTPATIENT
Start: 2020-09-09

## 2020-09-03 RX ORDER — EPINEPHRINE 0.3 MG/.3ML
0.3 INJECTION SUBCUTANEOUS ONCE AS NEEDED
Status: CANCELLED | OUTPATIENT
Start: 2020-09-09

## 2020-09-03 RX ORDER — HEPARIN 100 UNIT/ML
500 SYRINGE INTRAVENOUS
Status: CANCELLED | OUTPATIENT
Start: 2020-09-11

## 2020-09-03 RX ORDER — DIPHENHYDRAMINE HYDROCHLORIDE 50 MG/ML
50 INJECTION INTRAMUSCULAR; INTRAVENOUS ONCE AS NEEDED
Status: CANCELLED | OUTPATIENT
Start: 2020-09-09

## 2020-09-03 RX ORDER — SODIUM CHLORIDE 0.9 % (FLUSH) 0.9 %
10 SYRINGE (ML) INJECTION
Status: CANCELLED | OUTPATIENT
Start: 2020-09-11

## 2020-09-03 NOTE — PROGRESS NOTES
Subjective:       Patient ID: Gail Mckinnon is a 50 y.o. female.    Chief Complaint: Colon cancer      History:  Past Medical History:   Diagnosis Date    Anxiety     Depression     FH: ovarian cancer 3/16/2020    Hx of psychiatric care     Effexor, Paxil, Lexapro, Zoloft, Wellbutrin, Trazodone Buspar    Hyperthyroidism     Hypothyroid     Kidney calculi     Malignant neoplasm of sigmoid colon 3/16/2020    Menorrhagia     Multinodular goiter 2012    Palpitation     Psychiatric problem     Venous insufficiency      Past Surgical History:   Procedure Laterality Date     SECTION, CLASSIC      x3    COLONOSCOPY N/A 2020    Procedure: COLONOSCOPY;  Surgeon: Shane Parker MD;  Location: [facility];  Service: Endoscopy;  Laterality: N/A;    CYSTOSCOPY W/ URETERAL STENT PLACEMENT Bilateral 3/25/2020    Procedure: CYSTOSCOPY, WITH URETERAL STENT INSERTION;  Surgeon: Claudio Tyson MD;  Location: [facility] OR [address] [place] [address];  Service: Urology;  Laterality: Bilateral;    INSERTION OF TUNNELED CENTRAL VENOUS CATHETER (CVC) WITH SUBCUTANEOUS PORT N/A 2020    Procedure: [id number];  Surgeon: [facility] Provider;  Location: [facility] OR [address] [place] [address];  Service: Radiology;  Laterality: N/A;  Room 189/Cindy    LAPAROSCOPIC SIGMOIDECTOMY N/A 3/25/2020    Procedure: COLECTOMY, SIGMOID, LAPAROSCOPIC, flex sig, ERAS high;  Surgeon: Silvio Man MD;  Location: [facility] OR [address] [place] [address];  Service: Colon and Rectal;  Laterality: N/A;    MOBILIZATION OF SPLENIC FLEXURE  3/25/2020    Procedure: MOBILIZATION, SPLENIC FLEXURE;  Surgeon: Silvio Man MD;  Location: [facility] OR [address] [place] [address];  Service: Colon and Rectal;;    tonsillectomy      TOTAL ABDOMINAL HYSTERECTOMY W/ BILATERAL SALPINGOOPHORECTOMY N/A 3/25/2020    Procedure: HYSTERECTOMY, TOTAL, ABDOMINAL, WITH BILATERAL SALPINGO-OOPHORECTOMY;  Surgeon: Keron Brady MD;  Location: [facility] OR [address] [place] [address];  Service: Oncology;  Laterality: N/A;       Oncology History   Malignant neoplasm of sigmoid colon   3/16/2020 Initial Diagnosis    Malignant neoplasm of sigmoid colon     3/31/2020 Cancer Staged    Staging form: Colon and Rectum, AJCC 8th Edition  - Clinical stage from 3/31/2020: Stage IIIC (cT4b, cN2a, cM0)     5/6/2020 -  Chemotherapy    Treatment Summary   Plan Name: OP FOLFOX 6 Q2W  Treatment Goal: Curative  Status: Active  Start Date: 5/6/2020  End Date: 10/9/2020 (Planned)  Provider: MAXWELL Dove MD  Chemotherapy: fluorouraciL injection 945 mg, 400 mg/m2 = 945 mg, Intravenous, Clinic/HOD 1 time, 9 of 12 cycles  Administration: 945 mg (5/6/2020), 945 mg (5/20/2020), 945 mg (6/3/2020), 945 mg (6/17/2020), 945 mg (7/29/2020), 945 mg (8/12/2020), 945 mg (8/26/2020)  fluorouraciL 2,400 mg/m2 = 5,665 mg in sodium chloride 0.9% 240 mL chemo infusion, 2,400 mg/m2 = 5,665 mg, Intravenous, Over 46 hours, 9 of 12 cycles  Administration: 5,665 mg (5/6/2020), 5,665 mg (5/20/2020), 5,665 mg (6/3/2020), 5,665 mg (6/17/2020), 5,665 mg (7/29/2020), 5,665 mg (8/12/2020), 5,665 mg (8/26/2020)  leucovorin calcium 900 mg in dextrose 5 % 250 mL infusion, 945 mg, Intravenous, Clinic/HOD 1 time, 9 of 12 cycles  Administration: 900 mg (5/6/2020), 900 mg (5/20/2020), 900 mg (6/3/2020), 900 mg (6/17/2020), 945 mg (6/30/2020), 945 mg (7/20/2020), 945 mg (7/29/2020), 945 mg (8/12/2020), 945 mg (8/26/2020)  oxaliplatin (ELOXATIN) 200 mg in dextrose 5 % 500 mL chemo infusion, 201 mg, Intravenous, Clinic/HOD 1 time, 6 of 6 cycles  Dose modification: 65 mg/m2 (original dose 85 mg/m2, Cycle 6)  Administration: 200 mg (5/6/2020), 200 mg (5/20/2020), 200 mg (6/3/2020), 200 mg (6/17/2020), 201 mg (6/30/2020), 150 mg (7/20/2020)     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   4/3/2020 Initial Diagnosis    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     5/6/2020 -  Chemotherapy    Treatment Summary   Plan Name: OP FOLFOX 6 Q2W  Treatment Goal: Curative  Status: Active  Start Date:  5/6/2020  End Date: 10/9/2020 (Planned)  Provider: MAXWELL Dove MD  Chemotherapy: fluorouraciL injection 945 mg, 400 mg/m2 = 945 mg, Intravenous, Clinic/HOD 1 time, 3 of 12 cycles  Administration: 945 mg (5/6/2020), 945 mg (5/20/2020), 945 mg (6/3/2020)  fluorouraciL 2,400 mg/m2 = 5,665 mg in sodium chloride 0.9% 240 mL chemo infusion, 2,400 mg/m2 = 5,665 mg, Intravenous, Over 46 hours, 3 of 12 cycles  Administration: 5,665 mg (5/6/2020), 5,665 mg (5/20/2020), 5,665 mg (6/3/2020)  leucovorin calcium 900 mg in dextrose 5 % 250 mL infusion, 945 mg, Intravenous, Clinic/HOD 1 time, 3 of 12 cycles  Administration: 900 mg (5/6/2020), 900 mg (5/20/2020), 900 mg (6/3/2020)  oxaliplatin (ELOXATIN) 200 mg in dextrose 5 % 500 mL chemo infusion, 201 mg, Intravenous, Clinic/HOD 1 time, 3 of 12 cycles  Administration: 200 mg (5/6/2020), 200 mg (5/20/2020), 200 mg (6/3/2020)          Allergies:  Review of patient's allergies indicates:   Allergen Reactions    Penicillin g      unknown    Penicillins Hives     childhood allergy        Follow-up  Pertinent negatives include no abdominal pain, chest pain, chills, congestion, coughing, fatigue, fever, headaches, joint swelling, nausea, neck pain, numbness, rash, sore throat, vomiting or weakness.    50-year-old female who began having abdominal discomfort in June of 2019.  She was diagnosed with kidney stones and a CT scan showed some thickening in her colon.  She was then sent to see her PCP who recommended a colonoscopy.  The patient put this off until around January of 2020 when her PCP was adamant that she undergo further workup and she underwent colonoscopy in February of 2020.  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  The scans did not show obvious evidence of  metastatic disease.  There was a small lesion in the liver that was concerning for a cyst in this will need to be followed closely.  She has healed from her surgery and she did have 37 lymph nodes removed of which 4 positive for cancer.     She has essentially been through 8 cycles of chemotherapy however we did have to stop her 5th and 6th cycle secondary to an oxaliplatin reaction.      She continued to have problems with the oxaliplatin and is now on single agent fluorouracil.     Overall today she feels okay.  She denies abdominal pain or early satiety.  She does have some constipation but is now started on a stool softener and MiraLax.  She denies nausea, vomiting.  She denies fevers, chills or night sweats.  She denies any worsening neuropathy.  She has done well with the single agent fluorouracil and her labs are stable today.    ECO  ECOG SCORE            Review of Systems   Constitutional: Negative for appetite change, chills, fatigue, fever and unexpected weight change.   HENT: Negative for congestion, facial swelling, mouth sores, sinus pressure, sinus pain, sore throat and trouble swallowing.    Eyes: Negative for photophobia, pain and visual disturbance.   Respiratory: Negative for cough, chest tightness, shortness of breath, wheezing and stridor.    Cardiovascular: Negative for chest pain and leg swelling.   Gastrointestinal: Negative for abdominal distention, abdominal pain, blood in stool, constipation, diarrhea, nausea, rectal pain and vomiting.   Genitourinary: Negative for dysuria, flank pain and urgency.   Musculoskeletal: Negative for back pain, gait problem, joint swelling and neck pain.   Skin: Negative for color change and rash.   Neurological: Negative for dizziness, syncope, weakness, light-headedness, numbness and headaches.   Hematological: Negative for adenopathy. Does not bruise/bleed easily.   Psychiatric/Behavioral: Negative for agitation, behavioral problems and confusion. The  patient is not nervous/anxious.        Objective:      Physical Exam  Vitals signs reviewed.   Constitutional:       General: She is not in acute distress.     Appearance: She is well-developed. She is not diaphoretic.   HENT:      Head: Normocephalic.      Mouth/Throat:      Mouth: Mucous membranes are moist.   Eyes:      General: No scleral icterus.     Extraocular Movements: Extraocular movements intact.      Pupils: Pupils are equal, round, and reactive to light.   Neck:      Musculoskeletal: Normal range of motion and neck supple. No neck rigidity.      Trachea: No tracheal deviation.   Cardiovascular:      Rate and Rhythm: Normal rate.   Pulmonary:      Effort: Pulmonary effort is normal. No respiratory distress.      Breath sounds: Normal breath sounds. No stridor. No wheezing.   Abdominal:      General: Bowel sounds are normal. There is no distension.      Palpations: Abdomen is soft. There is no mass.      Tenderness: There is no abdominal tenderness.      Comments: Protuberant   Musculoskeletal:         General: No deformity.   Skin:     Findings: No rash.   Neurological:      General: No focal deficit present.      Mental Status: She is alert and oriented to person, place, and time.      Cranial Nerves: No cranial nerve deficit.      Sensory: No sensory deficit.   Psychiatric:         Behavior: Behavior normal.         Thought Content: Thought content normal.         Judgment: Judgment normal.         Vitals:    09/03/20 1121   BP: 124/71   Pulse: (!) 58   Resp: 20   Temp: 98.2 °F (36.8 °C)         Results:     Lab Visit on 09/03/2020   Component Date Value Ref Range Status    WBC 09/03/2020 5.51  3.90 - 12.70 K/uL Final    RBC 09/03/2020 4.51  4.00 - 5.40 M/uL Final    Hemoglobin 09/03/2020 11.3* 12.0 - 16.0 g/dL Final    Hematocrit 09/03/2020 37.7  37.0 - 48.5 % Final    Mean Corpuscular Volume 09/03/2020 84  82 - 98 fL Final    Mean Corpuscular Hemoglobin 09/03/2020 25.1* 27.0 - 31.0 pg Final     Mean Corpuscular Hemoglobin Conc 09/03/2020 30.0* 32.0 - 36.0 g/dL Final    RDW 09/03/2020 22.6* 11.5 - 14.5 % Final    Platelets 09/03/2020 362* 150 - 350 K/uL Final    MPV 09/03/2020 9.8  9.2 - 12.9 fL Final    Immature Granulocytes 09/03/2020 0.5  0.0 - 0.5 % Final    Gran # (ANC) 09/03/2020 2.5  1.8 - 7.7 K/uL Final    Immature Grans (Abs) 09/03/2020 0.03  0.00 - 0.04 K/uL Final    Comment: Mild elevation in immature granulocytes is non specific and   can be seen in a variety of conditions including stress response,   acute inflammation, trauma and pregnancy. Correlation with other   laboratory and clinical findings is essential.      Lymph # 09/03/2020 2.0  1.0 - 4.8 K/uL Final    Mono # 09/03/2020 0.6  0.3 - 1.0 K/uL Final    Eos # 09/03/2020 0.4  0.0 - 0.5 K/uL Final    Baso # 09/03/2020 0.03  0.00 - 0.20 K/uL Final    nRBC 09/03/2020 0  0 /100 WBC Final    Gran% 09/03/2020 45.5  38.0 - 73.0 % Final    Lymph% 09/03/2020 35.8  18.0 - 48.0 % Final    Mono% 09/03/2020 10.3  4.0 - 15.0 % Final    Eosinophil% 09/03/2020 7.4  0.0 - 8.0 % Final    Basophil% 09/03/2020 0.5  0.0 - 1.9 % Final    Differential Method 09/03/2020 Automated   Final    CEA 09/03/2020 0.8  0.0 - 5.0 ng/mL Final    Comment: [employer] immunometric chemiluminescent   methodology is used. Results obtained with different assay   methods cannot be used interchangeably.    Elevated concentrations of CEA may be found in smokers or in   patients with nonmalignant conditions. CEA concentrations,   regardless of level, should not be interpreted as absolute   evidence of the presence or absence of malignant disease.   The [employer] CEA assay is not recommended as a screening procedure   for cancer detection.   Patients taking very high Biotin doses of >300 mcg/day may   cause a negative bias in this assay.       Magnesium 09/03/2020 2.1  1.6 - 2.6 mg/dL Final    Sodium 09/03/2020 139  136 - 145 mmol/L Final     Potassium 09/03/2020 4.0  3.5 - 5.1 mmol/L Final    Chloride 09/03/2020 107  95 - 110 mmol/L Final    CO2 09/03/2020 26  22 - 31 mmol/L Final    Glucose 09/03/2020 98  70 - 110 mg/dL Final    Comment: The ADA recommends the following guidelines for fasting glucose:  Normal:       less than 100 mg/dL  Prediabetes:  100 mg/dL to 125 mg/dL  Diabetes:     126 mg/dL or higher      BUN, Bld 09/03/2020 12  7 - 18 mg/dL Final    Creatinine 09/03/2020 0.75  0.50 - 1.40 mg/dL Final    Calcium 09/03/2020 10.7* 8.4 - 10.2 mg/dL Final    Total Protein 09/03/2020 7.3  6.0 - 8.4 g/dL Final    Albumin 09/03/2020 4.2  3.5 - 5.2 g/dL Final    Total Bilirubin 09/03/2020 0.4  0.2 - 1.3 mg/dL Final    Alkaline Phosphatase 09/03/2020 151* 38 - 145 U/L Final    AST 09/03/2020 27  14 - 36 U/L Final    ALT 09/03/2020 22  0 - 35 U/L Final    Anion Gap 09/03/2020 6* 8 - 16 mmol/L Final    eGFR if African American 09/03/2020 >60  >60 mL/min/1.73 m^2 Final    eGFR if non African American 09/03/2020 >60  >60 mL/min/1.73 m^2 Final    Comment: Calculation used to obtain the estimated glomerular filtration  rate (eGFR) is the CKD-EPI equation.          Assessment:     1. Malignant neoplasm of sigmoid colon    2. 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 Psychophysiological insomnia    4. Anxiety    5. Other constipation    6. Nausea    7.     Chemotherapy adverse reaction  Plan:   Plan: COVID-19 test pending    1,2,6.  Stage III colon cancer:   Cycle 5 and 6 were not completed in total.  I did talk to her extensively today regarding her chemotherapy and we are going to continue with single agent fluorouracil for now.  She will present on Wednesday of next week for her next cycle of fluorouracil and then in 2 weeks with labs and treatment and I will see her back for clearance for chemotherapy however she is aware to contact me with any temperature over 100.4° F or any new problems and I will certainly see her sooner.     3,4.  Anxiety  and insomnia:   She has been on BuSpar the past.  She has also met with our psychologist.  Overall she is tolerating this well and we will continue to monitor closely.  Her Wellbutrin was increased to b.i.d. recently.     5.  Constipation: She will continue to take a stool softener daily and continue the MiraLax as needed.  She is aware to contact us with any problems or worsening symptoms will certainly see her sooner.      Pt displayed understanding of the above encounter and treatment plan. All thoughtful questions were answered to their satisfaction. Pte was advised to notify the care team or proceed to the ER if signs and symptoms worsen.

## 2020-09-06 DIAGNOSIS — F32.A DEPRESSION, UNSPECIFIED DEPRESSION TYPE: ICD-10-CM

## 2020-09-08 RX ORDER — LEVOTHYROXINE SODIUM 200 UG/1
TABLET ORAL
Qty: 90 TABLET | Refills: 0 | Status: SHIPPED | OUTPATIENT
Start: 2020-09-08 | End: 2021-01-07 | Stop reason: SDUPTHER

## 2020-09-08 NOTE — PROGRESS NOTES
Refill Routing Note   Medication(s) are not appropriate for processing by [facility]:       - Outside of protocol  - Drug-Disease Interaction (buPROPion and Fatty liver)     Medication-related problems identified:   Drug-disease interaction  Requires appointment  Medication Therapy Plan: CDMR. Needs appt(Annual); Drug-Disease: buPROPion and Fatty liver; Severe Warning for severe hepatic disease; Defer to you  Medication reconciliation completed: No      Automatic Epic Generated Protocol Data:        Requested Prescriptions   Pending Prescriptions Disp Refills    busPIRone (BUSPAR) 10 MG tablet [Pharmacy Med Name: busPIRone HCl 10 MG Oral Tablet] 180 tablet 0     Sig: Take 1 tablet by mouth twice daily       [employer] Refill Protocol Failed - 9/6/2020 11:16 AM        Failed - Patient seen within 3 months     Last visit with Tima Mederos MD: 7/11/2019  Last visit in [facility]: Visit date not found    Patient's next visit in [facility]: Visit date not found           Passed - Patient not pregnant        Passed - Med not refilled within 4 weeks       Off-Protocol Failed - 9/6/2020 11:16 AM        Failed - Medication not assigned to a protocol, review manually.        Passed - Office visit in past 12 months or future 90 days.     Recent Outpatient Visits            5 days ago Malignant neoplasm of sigmoid colon    [facility] MAXWELL Dove MD    4 weeks ago Malignant neoplasm of sigmoid colon    [facility] MAXWELL Dove MD    1 month ago Malignant neoplasm of sigmoid colon    [facility] MAXWELL Dove MD    1 month ago Generalized anxiety disorder with panic attacks    [facility] Gibbs, PhD    1 month ago Malignant neoplasm of sigmoid colon    [facility] MAXWELL Dove MD          Future  Appointments              Tomorrow CHAIR 27, [facility]    In 3 days INJECTION SCHEDULE, [facility], [facility]    In 2 weeks CHAIR 08, [facility], [facility]    In 2 weeks INJECTION SCHEDULE, [facility], [facility]                  buPROPion (WELLBUTRIN SR) 150 MG TBSR 12 hr tablet [Pharmacy Med Name: buPROPion HCl ER (SR) 150 MG Oral Tablet Extended Release 12 Hour] 180 tablet 0     Sig: Take 1 tablet by mouth twice daily       Psychiatry: Antidepressants - bupropion Failed - 9/6/2020 11:16 AM        Failed - Office visit in past 6 months or future 90 days.     Recent Outpatient Visits            5 days ago Malignant neoplasm of sigmoid colon    [facility] MAXWELL Dove MD    4 weeks ago Malignant neoplasm of sigmoid colon    [facility] MAXWELL Dove MD    1 month ago Malignant neoplasm of sigmoid colon    [facility] MAXWELL Dove MD    1 month ago Generalized anxiety disorder with panic attacks    [facility], PhD    1 month ago Malignant neoplasm of sigmoid colon    [facility] MAXWELL Dove MD          Future Appointments              Tomorrow CHAIR 27, [facility], [facility]    In 3 days INJECTION SCHEDULE, [facility], [facility]    In 2 weeks CHAIR 08, [facility], [facility]    In 2 weeks INJECTION SCHEDULE, [facility], [facility]                Passed - Patient is at least 18 years old        Passed - Last BP in  normal range within 360 days.     BP Readings from Last 3 Encounters:   09/03/20 124/71   08/28/20 132/76   08/26/20 121/72                    Appointments  past 12m or future 3m with PCP    Date Provider   Last Visit   7/11/2019 Tima Mederos MD   Next Visit   Visit date not found Tima Mederos MD   ED visits in past 90 days: 0     Note composed:3:01 PM 09/08/2020

## 2020-09-09 ENCOUNTER — INFUSION (OUTPATIENT)
Dept: INFUSION THERAPY | Facility: HOSPITAL | Age: 50
End: 2020-09-09
Attending: INTERNAL MEDICINE
Payer: COMMERCIAL

## 2020-09-09 VITALS
HEART RATE: 67 BPM | SYSTOLIC BLOOD PRESSURE: 113 MMHG | BODY MASS INDEX: 43.79 KG/M2 | DIASTOLIC BLOOD PRESSURE: 82 MMHG | TEMPERATURE: 99 F | HEIGHT: 66 IN | WEIGHT: 272.5 LBS | RESPIRATION RATE: 20 BRPM

## 2020-09-09 DIAGNOSIS — C77.2 METASTASIS TO INTESTINAL LYMPH NODE: Primary | ICD-10-CM

## 2020-09-09 DIAGNOSIS — C18.7 MALIGNANT NEOPLASM OF SIGMOID COLON: ICD-10-CM

## 2020-09-09 PROCEDURE — 25000003 PHARM REV CODE 250: Mod: PN | Performed by: INTERNAL MEDICINE

## 2020-09-09 PROCEDURE — 96375 TX/PRO/DX INJ NEW DRUG ADDON: CPT | Mod: PN

## 2020-09-09 PROCEDURE — 96367 TX/PROPH/DG ADDL SEQ IV INF: CPT | Mod: PN

## 2020-09-09 PROCEDURE — 96366 THER/PROPH/DIAG IV INF ADDON: CPT

## 2020-09-09 PROCEDURE — 96365 THER/PROPH/DIAG IV INF INIT: CPT | Mod: PN

## 2020-09-09 PROCEDURE — 96416 CHEMO PROLONG INFUSE W/PUMP: CPT | Mod: PN

## 2020-09-09 PROCEDURE — 96409 CHEMO IV PUSH SNGL DRUG: CPT | Mod: PN

## 2020-09-09 PROCEDURE — 63600175 PHARM REV CODE 636 W HCPCS: Mod: PN | Performed by: INTERNAL MEDICINE

## 2020-09-09 RX ORDER — BUPROPION HYDROCHLORIDE 150 MG/1
TABLET, EXTENDED RELEASE ORAL
Qty: 180 TABLET | Refills: 0 | Status: SHIPPED | OUTPATIENT
Start: 2020-09-09 | End: 2021-07-12 | Stop reason: SDUPTHER

## 2020-09-09 RX ORDER — DIPHENHYDRAMINE HYDROCHLORIDE 50 MG/ML
50 INJECTION INTRAMUSCULAR; INTRAVENOUS ONCE AS NEEDED
Status: DISCONTINUED | OUTPATIENT
Start: 2020-09-09 | End: 2020-09-09 | Stop reason: HOSPADM

## 2020-09-09 RX ORDER — FLUOROURACIL 50 MG/ML
400 INJECTION, SOLUTION INTRAVENOUS
Status: COMPLETED | OUTPATIENT
Start: 2020-09-09 | End: 2020-09-09

## 2020-09-09 RX ORDER — BUSPIRONE HYDROCHLORIDE 10 MG/1
TABLET ORAL
Qty: 180 TABLET | Refills: 0 | Status: SHIPPED | OUTPATIENT
Start: 2020-09-09 | End: 2021-12-14 | Stop reason: SDUPTHER

## 2020-09-09 RX ORDER — HEPARIN 100 UNIT/ML
500 SYRINGE INTRAVENOUS
Status: DISCONTINUED | OUTPATIENT
Start: 2020-09-09 | End: 2020-09-09 | Stop reason: HOSPADM

## 2020-09-09 RX ORDER — EPINEPHRINE 0.3 MG/.3ML
0.3 INJECTION SUBCUTANEOUS ONCE AS NEEDED
Status: DISCONTINUED | OUTPATIENT
Start: 2020-09-09 | End: 2020-09-09 | Stop reason: HOSPADM

## 2020-09-09 RX ADMIN — SODIUM CHLORIDE: 9 INJECTION, SOLUTION INTRAVENOUS at 12:09

## 2020-09-09 RX ADMIN — FLUOROURACIL 945 MG: 50 INJECTION, SOLUTION INTRAVENOUS at 02:09

## 2020-09-09 RX ADMIN — LEUCOVORIN CALCIUM 945 MG: 350 INJECTION, POWDER, LYOPHILIZED, FOR SOLUTION INTRAMUSCULAR; INTRAVENOUS at 01:09

## 2020-09-09 RX ADMIN — DEXAMETHASONE SODIUM PHOSPHATE: 4 INJECTION, SOLUTION INTRA-ARTICULAR; INTRALESIONAL; INTRAMUSCULAR; INTRAVENOUS; SOFT TISSUE at 01:09

## 2020-09-09 RX ADMIN — APREPITANT 130 MG: 130 INJECTION, EMULSION INTRAVENOUS at 01:09

## 2020-09-09 RX ADMIN — FLUOROURACIL 5665 MG: 50 INJECTION, SOLUTION INTRAVENOUS at 02:09

## 2020-09-09 NOTE — PLAN OF CARE
Problem: Adult Inpatient Plan of Care  Goal: Plan of Care Review  Flowsheets (Taken 9/9/2020 1209)  Plan of Care Reviewed With: patient    Pt tolerated infusion well, NAD. CADD pump in place infusing well. Pt ambulated out of clinic without difficulty.

## 2020-09-09 NOTE — PLAN OF CARE
Problem: Adult Inpatient Plan of Care  Goal: Patient-Specific Goal (Individualization)  Flowsheets (Taken 9/9/2020 1330)  Individualized Care Needs: warm blanket, recliner  Anxieties, Fears or Concerns: getting port accessed  Patient-Specific Goals (Include Timeframe): Infection prevention during tx     Problem: Fatigue (Oncology Care)  Goal: Improved Activity Tolerance  Intervention: Promote Energy Conservation  Flowsheets (Taken 9/9/2020 1354)  Fatigue Management:   paced activity encouraged   frequent rest breaks encouraged  Sleep/Rest Enhancement:   awakenings minimized   noise level reduced   regular sleep/rest pattern promoted

## 2020-09-11 ENCOUNTER — INFUSION (OUTPATIENT)
Dept: INFUSION THERAPY | Facility: HOSPITAL | Age: 50
End: 2020-09-11
Attending: INTERNAL MEDICINE
Payer: COMMERCIAL

## 2020-09-11 VITALS
HEART RATE: 80 BPM | TEMPERATURE: 98 F | DIASTOLIC BLOOD PRESSURE: 75 MMHG | SYSTOLIC BLOOD PRESSURE: 121 MMHG | RESPIRATION RATE: 18 BRPM

## 2020-09-11 DIAGNOSIS — C18.7 MALIGNANT NEOPLASM OF SIGMOID COLON: ICD-10-CM

## 2020-09-11 DIAGNOSIS — C77.2 METASTASIS TO INTESTINAL LYMPH NODE: Primary | ICD-10-CM

## 2020-09-11 PROCEDURE — 63600175 PHARM REV CODE 636 W HCPCS: Mod: PN | Performed by: INTERNAL MEDICINE

## 2020-09-11 PROCEDURE — A4216 STERILE WATER/SALINE, 10 ML: HCPCS | Mod: PN | Performed by: INTERNAL MEDICINE

## 2020-09-11 PROCEDURE — 96523 IRRIG DRUG DELIVERY DEVICE: CPT | Mod: PN

## 2020-09-11 PROCEDURE — 25000003 PHARM REV CODE 250: Mod: PN | Performed by: INTERNAL MEDICINE

## 2020-09-11 RX ORDER — SODIUM CHLORIDE 0.9 % (FLUSH) 0.9 %
10 SYRINGE (ML) INJECTION
Status: DISCONTINUED | OUTPATIENT
Start: 2020-09-11 | End: 2020-09-11 | Stop reason: HOSPADM

## 2020-09-11 RX ORDER — HEPARIN 100 UNIT/ML
500 SYRINGE INTRAVENOUS
Status: DISCONTINUED | OUTPATIENT
Start: 2020-09-11 | End: 2020-09-11 | Stop reason: HOSPADM

## 2020-09-11 RX ADMIN — HEPARIN 500 UNITS: 100 SYRINGE at 02:09

## 2020-09-11 RX ADMIN — Medication 10 ML: at 02:09

## 2020-09-11 NOTE — PLAN OF CARE
Problem: Fatigue (Oncology Care)  Goal: Improved Activity Tolerance  Intervention: Promote Energy Conservation  Flowsheets (Taken 9/11/2020 1413)  Fatigue Management: frequent rest breaks encouraged  Sleep/Rest Enhancement: regular sleep/rest pattern promoted

## 2020-09-16 ENCOUNTER — PATIENT MESSAGE (OUTPATIENT)
Dept: HEMATOLOGY/ONCOLOGY | Facility: CLINIC | Age: 50
End: 2020-09-16

## 2020-09-17 ENCOUNTER — TELEPHONE (OUTPATIENT)
Dept: HEMATOLOGY/ONCOLOGY | Facility: CLINIC | Age: 50
End: 2020-09-17

## 2020-09-17 ENCOUNTER — TELEPHONE (OUTPATIENT)
Dept: INFUSION THERAPY | Facility: HOSPITAL | Age: 50
End: 2020-09-17

## 2020-09-17 NOTE — TELEPHONE ENCOUNTER
Spoke with pt requesting appt on 9/23 earlier in day due to leaving Friday 9/25 for trip out of town, 830 appt available, pt accepted.  Appts adjusted.

## 2020-09-17 NOTE — TELEPHONE ENCOUNTER
Called patient regarding her request to change times for next week's infusions. Let patient know that infusion will be calling her to discuss time changes she is requesting.  Regarding probiotics, those are over the counter, no prescription.  Patient voiced understanding and appreciation

## 2020-09-22 ENCOUNTER — OFFICE VISIT (OUTPATIENT)
Dept: HEMATOLOGY/ONCOLOGY | Facility: CLINIC | Age: 50
End: 2020-09-22
Payer: COMMERCIAL

## 2020-09-22 VITALS
DIASTOLIC BLOOD PRESSURE: 77 MMHG | OXYGEN SATURATION: 100 % | SYSTOLIC BLOOD PRESSURE: 126 MMHG | WEIGHT: 272.94 LBS | RESPIRATION RATE: 20 BRPM | HEIGHT: 66 IN | BODY MASS INDEX: 43.86 KG/M2 | TEMPERATURE: 96 F | HEART RATE: 58 BPM

## 2020-09-22 DIAGNOSIS — C18.7 MALIGNANT NEOPLASM OF SIGMOID COLON: Primary | ICD-10-CM

## 2020-09-22 DIAGNOSIS — K59.09 OTHER CONSTIPATION: ICD-10-CM

## 2020-09-22 DIAGNOSIS — C77.2 METASTASIS TO INTESTINAL LYMPH NODE: ICD-10-CM

## 2020-09-22 DIAGNOSIS — F41.9 ANXIETY: ICD-10-CM

## 2020-09-22 PROCEDURE — 3078F PR MOST RECENT DIASTOLIC BLOOD PRESSURE < 80 MM HG: ICD-10-PCS | Mod: CPTII,S$GLB,, | Performed by: INTERNAL MEDICINE

## 2020-09-22 PROCEDURE — 3008F BODY MASS INDEX DOCD: CPT | Mod: CPTII,S$GLB,, | Performed by: INTERNAL MEDICINE

## 2020-09-22 PROCEDURE — 3074F SYST BP LT 130 MM HG: CPT | Mod: CPTII,S$GLB,, | Performed by: INTERNAL MEDICINE

## 2020-09-22 PROCEDURE — 3008F PR BODY MASS INDEX (BMI) DOCUMENTED: ICD-10-PCS | Mod: CPTII,S$GLB,, | Performed by: INTERNAL MEDICINE

## 2020-09-22 PROCEDURE — 99214 PR OFFICE/OUTPT VISIT, EST, LEVL IV, 30-39 MIN: ICD-10-PCS | Mod: S$GLB,,, | Performed by: INTERNAL MEDICINE

## 2020-09-22 PROCEDURE — 3074F PR MOST RECENT SYSTOLIC BLOOD PRESSURE < 130 MM HG: ICD-10-PCS | Mod: CPTII,S$GLB,, | Performed by: INTERNAL MEDICINE

## 2020-09-22 PROCEDURE — 99214 OFFICE O/P EST MOD 30 MIN: CPT | Mod: S$GLB,,, | Performed by: INTERNAL MEDICINE

## 2020-09-22 PROCEDURE — 99999 PR PBB SHADOW E&M-EST. PATIENT-LVL IV: CPT | Mod: PBBFAC,,, | Performed by: INTERNAL MEDICINE

## 2020-09-22 PROCEDURE — 99999 PR PBB SHADOW E&M-EST. PATIENT-LVL IV: ICD-10-PCS | Mod: PBBFAC,,, | Performed by: INTERNAL MEDICINE

## 2020-09-22 PROCEDURE — 3078F DIAST BP <80 MM HG: CPT | Mod: CPTII,S$GLB,, | Performed by: INTERNAL MEDICINE

## 2020-09-22 RX ORDER — SODIUM CHLORIDE 0.9 % (FLUSH) 0.9 %
10 SYRINGE (ML) INJECTION
Status: CANCELLED | OUTPATIENT
Start: 2020-09-25

## 2020-09-22 RX ORDER — HEPARIN 100 UNIT/ML
500 SYRINGE INTRAVENOUS
Status: CANCELLED | OUTPATIENT
Start: 2020-09-23

## 2020-09-22 RX ORDER — FLUOROURACIL 50 MG/ML
400 INJECTION, SOLUTION INTRAVENOUS
Status: CANCELLED | OUTPATIENT
Start: 2020-09-23

## 2020-09-22 RX ORDER — DIPHENHYDRAMINE HYDROCHLORIDE 50 MG/ML
50 INJECTION INTRAMUSCULAR; INTRAVENOUS ONCE AS NEEDED
Status: CANCELLED | OUTPATIENT
Start: 2020-09-23

## 2020-09-22 RX ORDER — EPINEPHRINE 0.3 MG/.3ML
0.3 INJECTION SUBCUTANEOUS ONCE AS NEEDED
Status: CANCELLED | OUTPATIENT
Start: 2020-09-23

## 2020-09-22 RX ORDER — SODIUM CHLORIDE 0.9 % (FLUSH) 0.9 %
10 SYRINGE (ML) INJECTION
Status: CANCELLED | OUTPATIENT
Start: 2020-09-23

## 2020-09-22 RX ORDER — HEPARIN 100 UNIT/ML
500 SYRINGE INTRAVENOUS
Status: CANCELLED | OUTPATIENT
Start: 2020-09-25

## 2020-09-22 NOTE — PROGRESS NOTES
History of present illness:  The patient is a 50-year-old white female, followed by Dr. Dove, for a diagnosis of stage III adenocarcinoma the colon.  Patient is receiving adjuvant chemotherapy.  FOLFOX was taken out of her regimen after infusion reactions.  Patient returns to clinic for cycle 11 of therapy and is highly motivated toward making up the 2 missed cycles she had around her infusion reaction.  Patient is not experiencing difficulty with mouth sores, diarrhea, nausea, vomiting, fevers, chills, etc..  No other complaints for pertinent findings on a 14 point review systems.    Physical examination:  The patient is a well-developed, obese, white female, no acute distress, with a weight of 272.5 lb.  VITAL SIGNS: Documented  and reviewed this visit.  HEENT: Normocephalic, atraumatic. Oral mucosa pink and moist. Lips without   lesions. Tongue midline. Oropharynx clear. Nonicteric sclerae.   NECK: Supple, no adenopathy. No carotid bruits, thyromegaly or thyroid nodule.   HEART: Regular rate and rhythm without murmur, gallop or rub.   LUNGS: Clear to auscultation bilaterally. Normal respiratory effort.   ABDOMEN: Soft, nontender, nondistended with positive normoactive bowel sounds,   no hepatosplenomegaly.   EXTREMITIES: No cyanosis, clubbing or edema. Distal pulses are intact.   AXILLAE AND GROIN: No palpable pathologic lymphadenopathy is appreciated.   SKIN: Intact/turgor normal   NEUROLOGIC: Cranial nerves II-XII grossly intact. Motor: Good muscle bulk and   tone. Strength/sensory 5/5 throughout. Gait stable.     Laboratory:  White count 5.7, hemoglobin 11.3, hematocrit 38, platelets 341, absolute neutrophil count is 2500.  Sodium 138, potassium 4.3, chloride 107, CO2 25, BUN 22, creatinine 1, glucose 99, calcium 10.7, alkaline phosphatase 149, the remainder the patient's liver function test within normal limits, GFR is greater than 60.  CEA 0.9.    Impression:  1.  Stage III adenocarcinoma the colon.  2.   Oxaliplatin infusion reaction.  3.  Situational anxiety-stable.    Plan:  1.  Continue with 11th cycle of therapy minus oxaliplatin.  2.  Return to clinic 2 weeks from now with interval CBC, CMP, LDH, and magnesium at which time will proceed with 12th cycle of therapy.    This note was created using voice recognition software and may contain grammatical errors.

## 2020-09-23 ENCOUNTER — INFUSION (OUTPATIENT)
Dept: INFUSION THERAPY | Facility: HOSPITAL | Age: 50
End: 2020-09-23
Attending: INTERNAL MEDICINE
Payer: COMMERCIAL

## 2020-09-23 VITALS
BODY MASS INDEX: 43.86 KG/M2 | SYSTOLIC BLOOD PRESSURE: 143 MMHG | HEIGHT: 66 IN | HEART RATE: 62 BPM | DIASTOLIC BLOOD PRESSURE: 80 MMHG | WEIGHT: 272.94 LBS | TEMPERATURE: 98 F | RESPIRATION RATE: 16 BRPM

## 2020-09-23 DIAGNOSIS — C77.2 METASTASIS TO INTESTINAL LYMPH NODE: Primary | ICD-10-CM

## 2020-09-23 DIAGNOSIS — C18.7 MALIGNANT NEOPLASM OF SIGMOID COLON: ICD-10-CM

## 2020-09-23 PROCEDURE — 96367 TX/PROPH/DG ADDL SEQ IV INF: CPT | Mod: PN

## 2020-09-23 PROCEDURE — 96375 TX/PRO/DX INJ NEW DRUG ADDON: CPT | Mod: PN

## 2020-09-23 PROCEDURE — 63600175 PHARM REV CODE 636 W HCPCS: Mod: PN | Performed by: INTERNAL MEDICINE

## 2020-09-23 PROCEDURE — 25000003 PHARM REV CODE 250: Mod: PN | Performed by: INTERNAL MEDICINE

## 2020-09-23 PROCEDURE — 96416 CHEMO PROLONG INFUSE W/PUMP: CPT | Mod: PN

## 2020-09-23 PROCEDURE — 96409 CHEMO IV PUSH SNGL DRUG: CPT | Mod: PN

## 2020-09-23 RX ORDER — FLUOROURACIL 50 MG/ML
400 INJECTION, SOLUTION INTRAVENOUS
Status: COMPLETED | OUTPATIENT
Start: 2020-09-23 | End: 2020-09-23

## 2020-09-23 RX ADMIN — SODIUM CHLORIDE: 9 INJECTION, SOLUTION INTRAVENOUS at 09:09

## 2020-09-23 RX ADMIN — FLUOROURACIL 945 MG: 50 INJECTION, SOLUTION INTRAVENOUS at 10:09

## 2020-09-23 RX ADMIN — LEUCOVORIN CALCIUM 945 MG: 350 INJECTION, POWDER, LYOPHILIZED, FOR SOLUTION INTRAMUSCULAR; INTRAVENOUS at 09:09

## 2020-09-23 RX ADMIN — FLUOROURACIL 5665 MG: 50 INJECTION, SOLUTION INTRAVENOUS at 10:09

## 2020-09-23 RX ADMIN — APREPITANT 130 MG: 130 INJECTION, EMULSION INTRAVENOUS at 09:09

## 2020-09-23 RX ADMIN — DEXAMETHASONE SODIUM PHOSPHATE: 4 INJECTION, SOLUTION INTRA-ARTICULAR; INTRALESIONAL; INTRAMUSCULAR; INTRAVENOUS; SOFT TISSUE at 09:09

## 2020-09-23 NOTE — PLAN OF CARE
Pt tolerated CL infusion and 5FU IVP well.  Pt connected to portable chemo pumo.  To RTC on Friday to be disconnected from portable pump.  Instructed to call MD with any problems.

## 2020-09-25 ENCOUNTER — INFUSION (OUTPATIENT)
Dept: INFUSION THERAPY | Facility: HOSPITAL | Age: 50
End: 2020-09-25
Attending: INTERNAL MEDICINE
Payer: COMMERCIAL

## 2020-09-25 DIAGNOSIS — C77.2 METASTASIS TO INTESTINAL LYMPH NODE: Primary | ICD-10-CM

## 2020-09-25 DIAGNOSIS — C18.7 MALIGNANT NEOPLASM OF SIGMOID COLON: ICD-10-CM

## 2020-09-25 PROCEDURE — A4216 STERILE WATER/SALINE, 10 ML: HCPCS | Mod: PN | Performed by: INTERNAL MEDICINE

## 2020-09-25 PROCEDURE — 63600175 PHARM REV CODE 636 W HCPCS: Mod: PN | Performed by: INTERNAL MEDICINE

## 2020-09-25 PROCEDURE — 25000003 PHARM REV CODE 250: Mod: PN | Performed by: INTERNAL MEDICINE

## 2020-09-25 PROCEDURE — 96523 IRRIG DRUG DELIVERY DEVICE: CPT | Mod: PN

## 2020-09-25 RX ORDER — HEPARIN 100 UNIT/ML
500 SYRINGE INTRAVENOUS
Status: DISCONTINUED | OUTPATIENT
Start: 2020-09-25 | End: 2020-09-25 | Stop reason: HOSPADM

## 2020-09-25 RX ORDER — SODIUM CHLORIDE 0.9 % (FLUSH) 0.9 %
10 SYRINGE (ML) INJECTION
Status: DISCONTINUED | OUTPATIENT
Start: 2020-09-25 | End: 2020-09-25 | Stop reason: HOSPADM

## 2020-09-25 RX ADMIN — Medication 10 ML: at 09:09

## 2020-09-25 RX ADMIN — HEPARIN 500 UNITS: 100 SYRINGE at 09:09

## 2020-10-05 ENCOUNTER — LAB VISIT (OUTPATIENT)
Dept: LAB | Facility: HOSPITAL | Age: 50
End: 2020-10-05
Attending: INTERNAL MEDICINE
Payer: COMMERCIAL

## 2020-10-05 ENCOUNTER — PATIENT MESSAGE (OUTPATIENT)
Dept: ADMINISTRATIVE | Facility: HOSPITAL | Age: 50
End: 2020-10-05

## 2020-10-05 ENCOUNTER — OFFICE VISIT (OUTPATIENT)
Dept: HEMATOLOGY/ONCOLOGY | Facility: CLINIC | Age: 50
End: 2020-10-05
Payer: COMMERCIAL

## 2020-10-05 VITALS
RESPIRATION RATE: 18 BRPM | BODY MASS INDEX: 44.44 KG/M2 | SYSTOLIC BLOOD PRESSURE: 131 MMHG | DIASTOLIC BLOOD PRESSURE: 81 MMHG | TEMPERATURE: 98 F | WEIGHT: 275.38 LBS | OXYGEN SATURATION: 100 % | HEART RATE: 70 BPM

## 2020-10-05 DIAGNOSIS — C18.7 MALIGNANT NEOPLASM OF SIGMOID COLON: Primary | ICD-10-CM

## 2020-10-05 DIAGNOSIS — C18.7 MALIGNANT NEOPLASM OF SIGMOID COLON: ICD-10-CM

## 2020-10-05 LAB
ALBUMIN SERPL BCP-MCNC: 4.2 G/DL (ref 3.5–5.2)
ALP SERPL-CCNC: 181 U/L (ref 38–145)
ALT SERPL W/O P-5'-P-CCNC: 28 U/L (ref 0–35)
ANION GAP SERPL CALC-SCNC: 5 MMOL/L (ref 8–16)
AST SERPL-CCNC: 30 U/L (ref 14–36)
BASOPHILS # BLD AUTO: 0.03 K/UL (ref 0–0.2)
BASOPHILS NFR BLD: 0.6 % (ref 0–1.9)
BILIRUB SERPL-MCNC: 0.5 MG/DL (ref 0.2–1.3)
BUN SERPL-MCNC: 17 MG/DL (ref 7–18)
CALCIUM SERPL-MCNC: 11 MG/DL (ref 8.4–10.2)
CHLORIDE SERPL-SCNC: 108 MMOL/L (ref 95–110)
CO2 SERPL-SCNC: 26 MMOL/L (ref 22–31)
CREAT SERPL-MCNC: 0.73 MG/DL (ref 0.5–1.4)
DIFFERENTIAL METHOD: ABNORMAL
EOSINOPHIL # BLD AUTO: 0.6 K/UL (ref 0–0.5)
EOSINOPHIL NFR BLD: 11 % (ref 0–8)
ERYTHROCYTE [DISTWIDTH] IN BLOOD BY AUTOMATED COUNT: 21.2 % (ref 11.5–14.5)
EST. GFR  (AFRICAN AMERICAN): >60 ML/MIN/1.73 M^2
EST. GFR  (NON AFRICAN AMERICAN): >60 ML/MIN/1.73 M^2
GLUCOSE SERPL-MCNC: 101 MG/DL (ref 70–110)
HCT VFR BLD AUTO: 39.2 % (ref 37–48.5)
HGB BLD-MCNC: 11.8 G/DL (ref 12–16)
IMM GRANULOCYTES # BLD AUTO: 0.01 K/UL (ref 0–0.04)
IMM GRANULOCYTES NFR BLD AUTO: 0.2 % (ref 0–0.5)
LDH SERPL L TO P-CCNC: 442 U/L (ref 313–618)
LYMPHOCYTES # BLD AUTO: 1.8 K/UL (ref 1–4.8)
LYMPHOCYTES NFR BLD: 36.3 % (ref 18–48)
MAGNESIUM SERPL-MCNC: 2.1 MG/DL (ref 1.6–2.6)
MCH RBC QN AUTO: 26.1 PG (ref 27–31)
MCHC RBC AUTO-ENTMCNC: 30.1 G/DL (ref 32–36)
MCV RBC AUTO: 87 FL (ref 82–98)
MONOCYTES # BLD AUTO: 0.5 K/UL (ref 0.3–1)
MONOCYTES NFR BLD: 10 % (ref 4–15)
NEUTROPHILS # BLD AUTO: 2.1 K/UL (ref 1.8–7.7)
NEUTROPHILS NFR BLD: 41.9 % (ref 38–73)
NRBC BLD-RTO: 0 /100 WBC
PLATELET # BLD AUTO: 285 K/UL (ref 150–350)
PMV BLD AUTO: 9.8 FL (ref 9.2–12.9)
POTASSIUM SERPL-SCNC: 4.1 MMOL/L (ref 3.5–5.1)
PROT SERPL-MCNC: 7.1 G/DL (ref 6–8.4)
RBC # BLD AUTO: 4.52 M/UL (ref 4–5.4)
SODIUM SERPL-SCNC: 139 MMOL/L (ref 136–145)
WBC # BLD AUTO: 4.98 K/UL (ref 3.9–12.7)

## 2020-10-05 PROCEDURE — 83615 LACTATE (LD) (LDH) ENZYME: CPT | Mod: PN

## 2020-10-05 PROCEDURE — 80053 COMPREHEN METABOLIC PANEL: CPT | Mod: PN

## 2020-10-05 PROCEDURE — 3008F BODY MASS INDEX DOCD: CPT | Mod: CPTII,S$GLB,, | Performed by: NURSE PRACTITIONER

## 2020-10-05 PROCEDURE — 36415 COLL VENOUS BLD VENIPUNCTURE: CPT | Mod: PN

## 2020-10-05 PROCEDURE — 3079F PR MOST RECENT DIASTOLIC BLOOD PRESSURE 80-89 MM HG: ICD-10-PCS | Mod: CPTII,S$GLB,, | Performed by: NURSE PRACTITIONER

## 2020-10-05 PROCEDURE — 80053 COMPREHEN METABOLIC PANEL: CPT

## 2020-10-05 PROCEDURE — 83735 ASSAY OF MAGNESIUM: CPT

## 2020-10-05 PROCEDURE — 3008F PR BODY MASS INDEX (BMI) DOCUMENTED: ICD-10-PCS | Mod: CPTII,S$GLB,, | Performed by: NURSE PRACTITIONER

## 2020-10-05 PROCEDURE — 99999 PR PBB SHADOW E&M-EST. PATIENT-LVL IV: CPT | Mod: PBBFAC,,, | Performed by: NURSE PRACTITIONER

## 2020-10-05 PROCEDURE — 85025 COMPLETE CBC W/AUTO DIFF WBC: CPT

## 2020-10-05 PROCEDURE — 99214 PR OFFICE/OUTPT VISIT, EST, LEVL IV, 30-39 MIN: ICD-10-PCS | Mod: S$GLB,,, | Performed by: NURSE PRACTITIONER

## 2020-10-05 PROCEDURE — 99999 PR PBB SHADOW E&M-EST. PATIENT-LVL IV: ICD-10-PCS | Mod: PBBFAC,,, | Performed by: NURSE PRACTITIONER

## 2020-10-05 PROCEDURE — 83615 LACTATE (LD) (LDH) ENZYME: CPT

## 2020-10-05 PROCEDURE — 3075F SYST BP GE 130 - 139MM HG: CPT | Mod: CPTII,S$GLB,, | Performed by: NURSE PRACTITIONER

## 2020-10-05 PROCEDURE — 3079F DIAST BP 80-89 MM HG: CPT | Mod: CPTII,S$GLB,, | Performed by: NURSE PRACTITIONER

## 2020-10-05 PROCEDURE — 99214 OFFICE O/P EST MOD 30 MIN: CPT | Mod: S$GLB,,, | Performed by: NURSE PRACTITIONER

## 2020-10-05 PROCEDURE — 3075F PR MOST RECENT SYSTOLIC BLOOD PRESS GE 130-139MM HG: ICD-10-PCS | Mod: CPTII,S$GLB,, | Performed by: NURSE PRACTITIONER

## 2020-10-05 PROCEDURE — 85025 COMPLETE CBC W/AUTO DIFF WBC: CPT | Mod: PN

## 2020-10-05 PROCEDURE — 83735 ASSAY OF MAGNESIUM: CPT | Mod: PN

## 2020-10-05 RX ORDER — SODIUM CHLORIDE 0.9 % (FLUSH) 0.9 %
10 SYRINGE (ML) INJECTION
Status: CANCELLED | OUTPATIENT
Start: 2020-10-09

## 2020-10-05 RX ORDER — EPINEPHRINE 0.3 MG/.3ML
0.3 INJECTION SUBCUTANEOUS ONCE AS NEEDED
Status: CANCELLED | OUTPATIENT
Start: 2020-10-07

## 2020-10-05 RX ORDER — DIPHENHYDRAMINE HYDROCHLORIDE 50 MG/ML
50 INJECTION INTRAMUSCULAR; INTRAVENOUS ONCE AS NEEDED
Status: CANCELLED | OUTPATIENT
Start: 2020-10-07

## 2020-10-05 RX ORDER — HEPARIN 100 UNIT/ML
500 SYRINGE INTRAVENOUS
Status: CANCELLED | OUTPATIENT
Start: 2020-10-09

## 2020-10-05 RX ORDER — HEPARIN 100 UNIT/ML
500 SYRINGE INTRAVENOUS
Status: CANCELLED | OUTPATIENT
Start: 2020-10-07

## 2020-10-05 RX ORDER — FLUOROURACIL 50 MG/ML
400 INJECTION, SOLUTION INTRAVENOUS
Status: CANCELLED | OUTPATIENT
Start: 2020-10-07

## 2020-10-05 RX ORDER — SODIUM CHLORIDE 0.9 % (FLUSH) 0.9 %
10 SYRINGE (ML) INJECTION
Status: CANCELLED | OUTPATIENT
Start: 2020-10-07

## 2020-10-05 NOTE — Clinical Note
Proceed with chemo on 10/07; f/u in 2 weeks with Dr. Dove with CBC, CMP, Magnesium, CEA prior.  Patient to talk to Dr. Dove in regards to making up 2 cycles - see note

## 2020-10-05 NOTE — PROGRESS NOTES
"History of present illness:  The patient is a 50-year-old white female, followed by Dr. Dove, for a diagnosis of Stage III adenocarcinoma the colon.  Patient is receiving adjuvant chemotherapy.  Oxaliplatin was taken out of her FOLFOX regimen after infusion reactions on cycles 5 & 6.    Patient returns to clinic for Cycle 12 of therapy and remains highly motivated toward making up the 2 missed cycles (5 & 6) she had around her infusion reaction.  This will be forwarded to Dr. Dove.      She complains only of fatigue and constipation.  Miralax is assisting with her constipation and she knows she needs to increase her daily hydration.  She denies experiencing any difficulty with fevers, chills, mouth sores, diarrhea, nausea, vomiting, , etc.  No other complaints for pertinent findings on a 14 point review systems.    Physical examination:  GENERAL: Well-developed, obese, white female, no acute distress.  Alert & oriented x 3.  VITAL SIGNS:   Vitals:    10/05/20 1249   BP: 131/81   BP Location: Right arm   Patient Position: Sitting   BP Method: Large (Automatic)   Pulse: 70   Resp: 18   Temp: 98.2 °F (36.8 °C)   TempSrc: Temporal   SpO2: 100%   Weight: 124.9 kg (275 lb 5.7 oz)   Height: (P) 5' 6" (1.676 m)     HEENT: Normocephalic, atraumatic. Oral mucosa pink and moist. Lips without lesions. Tongue midline. Oropharynx clear. Nonicteric sclerae.   NECK: Supple, no adenopathy. No carotid bruits, thyromegaly or thyroid nodule.   HEART: Regular rate and rhythm without murmur, gallop or rub.   LUNGS: Clear to auscultation bilaterally. Normal respiratory effort.   ABDOMEN: Soft, nontender, nondistended with positive normoactive bowel sounds, no hepatosplenomegaly.   EXTREMITIES: No cyanosis, clubbing or edema. Distal pulses are intact.   AXILLAE AND GROIN: No palpable pathologic lymphadenopathy is appreciated.   SKIN: Intact/turgor normal   NEUROLOGIC: Cranial nerves II-XII grossly intact. Motor: Good muscle bulk " and tone. Strength/sensory 5/5 throughout. Gait stable.     Laboratory:  Lab Results   Component Value Date    WBC 4.98 10/05/2020    RBC 4.52 10/05/2020    HGB 11.8 (L) 10/05/2020    HCT 39.2 10/05/2020    MCV 87 10/05/2020    MCH 26.1 (L) 10/05/2020    MCHC 30.1 (L) 10/05/2020    RDW 21.2 (H) 10/05/2020     10/05/2020    MPV 9.8 10/05/2020    GRAN 2.1 10/05/2020    GRAN 41.9 10/05/2020    LYMPH 1.8 10/05/2020    LYMPH 36.3 10/05/2020    MONO 0.5 10/05/2020    MONO 10.0 10/05/2020    EOS 0.6 (H) 10/05/2020    BASO 0.03 10/05/2020    EOSINOPHIL 11.0 (H) 10/05/2020    BASOPHIL 0.6 10/05/2020     CMP  Sodium   Date Value Ref Range Status   10/05/2020 139 136 - 145 mmol/L Final     Potassium   Date Value Ref Range Status   10/05/2020 4.1 3.5 - 5.1 mmol/L Final     Chloride   Date Value Ref Range Status   10/05/2020 108 95 - 110 mmol/L Final     CO2   Date Value Ref Range Status   10/05/2020 26 22 - 31 mmol/L Final     Glucose   Date Value Ref Range Status   10/05/2020 101 70 - 110 mg/dL Final     Comment:     The ADA recommends the following guidelines for fasting glucose:  Normal:       less than 100 mg/dL  Prediabetes:  100 mg/dL to 125 mg/dL  Diabetes:     126 mg/dL or higher       BUN, Bld   Date Value Ref Range Status   10/05/2020 17 7 - 18 mg/dL Final     Creatinine   Date Value Ref Range Status   10/05/2020 0.73 0.50 - 1.40 mg/dL Final     Calcium   Date Value Ref Range Status   10/05/2020 11.0 (H) 8.4 - 10.2 mg/dL Final     Total Protein   Date Value Ref Range Status   10/05/2020 7.1 6.0 - 8.4 g/dL Final     Albumin   Date Value Ref Range Status   10/05/2020 4.2 3.5 - 5.2 g/dL Final     Total Bilirubin   Date Value Ref Range Status   10/05/2020 0.5 0.2 - 1.3 mg/dL Final     Alkaline Phosphatase   Date Value Ref Range Status   10/05/2020 181 (H) 38 - 145 U/L Final     AST   Date Value Ref Range Status   10/05/2020 30 14 - 36 U/L Final     ALT   Date Value Ref Range Status   10/05/2020 28 0 - 35 U/L Final      Anion Gap   Date Value Ref Range Status   10/05/2020 5 (L) 8 - 16 mmol/L Final     eGFR if    Date Value Ref Range Status   10/05/2020 >60 >60 mL/min/1.73 m^2 Final     eGFR if non    Date Value Ref Range Status   10/05/2020 >60 >60 mL/min/1.73 m^2 Final     Comment:     Calculation used to obtain the estimated glomerular filtration  rate (eGFR) is the CKD-EPI equation.        Magnesium:  2.1  LDH:  442    Impression:  1.  Stage III adenocarcinoma the colon.  2.  Oxaliplatin infusion reaction - permanently discontinued after Cycle 6   3.  Situational anxiety-stable.  4.  Constipation - increase daily hydration; balanced meals with increased fiber; Miralax prn  5.  Chemotherapy associated fatigue - stable  6.  Chemotherapy associated anemia - stable  7.  Hypercalcemia - patient to follow with Endocrinologist    Plan:  1.  Continue with 12th cycle of therapy minus Oxaliplatin.  2.  Return to clinic 2 weeks from now with interval CBC, CMP, Magnesium & CEA prior.    Assessment/plan reviewed and approved by Dr. Huizar.

## 2020-10-06 ENCOUNTER — INFUSION (OUTPATIENT)
Dept: INFUSION THERAPY | Facility: HOSPITAL | Age: 50
End: 2020-10-06
Attending: INTERNAL MEDICINE
Payer: COMMERCIAL

## 2020-10-06 VITALS
TEMPERATURE: 98 F | DIASTOLIC BLOOD PRESSURE: 75 MMHG | HEIGHT: 66 IN | HEART RATE: 69 BPM | BODY MASS INDEX: 44.15 KG/M2 | SYSTOLIC BLOOD PRESSURE: 130 MMHG | RESPIRATION RATE: 16 BRPM | WEIGHT: 274.75 LBS

## 2020-10-06 DIAGNOSIS — C18.7 MALIGNANT NEOPLASM OF SIGMOID COLON: ICD-10-CM

## 2020-10-06 DIAGNOSIS — C77.2 METASTASIS TO INTESTINAL LYMPH NODE: Primary | ICD-10-CM

## 2020-10-06 PROCEDURE — 63600175 PHARM REV CODE 636 W HCPCS: Mod: PN | Performed by: NURSE PRACTITIONER

## 2020-10-06 PROCEDURE — A4216 STERILE WATER/SALINE, 10 ML: HCPCS | Mod: PN | Performed by: NURSE PRACTITIONER

## 2020-10-06 PROCEDURE — 96375 TX/PRO/DX INJ NEW DRUG ADDON: CPT | Mod: PN

## 2020-10-06 PROCEDURE — 96416 CHEMO PROLONG INFUSE W/PUMP: CPT | Mod: PN

## 2020-10-06 PROCEDURE — 96409 CHEMO IV PUSH SNGL DRUG: CPT | Mod: PN

## 2020-10-06 PROCEDURE — 96367 TX/PROPH/DG ADDL SEQ IV INF: CPT | Mod: PN

## 2020-10-06 PROCEDURE — 25000003 PHARM REV CODE 250: Mod: PN | Performed by: NURSE PRACTITIONER

## 2020-10-06 RX ORDER — SODIUM CHLORIDE 0.9 % (FLUSH) 0.9 %
10 SYRINGE (ML) INJECTION
Status: DISCONTINUED | OUTPATIENT
Start: 2020-10-06 | End: 2020-10-06 | Stop reason: HOSPADM

## 2020-10-06 RX ORDER — FLUOROURACIL 50 MG/ML
400 INJECTION, SOLUTION INTRAVENOUS
Status: COMPLETED | OUTPATIENT
Start: 2020-10-06 | End: 2020-10-06

## 2020-10-06 RX ADMIN — SODIUM CHLORIDE: 9 INJECTION, SOLUTION INTRAVENOUS at 02:10

## 2020-10-06 RX ADMIN — Medication 10 ML: at 02:10

## 2020-10-06 RX ADMIN — DEXAMETHASONE SODIUM PHOSPHATE: 4 INJECTION, SOLUTION INTRA-ARTICULAR; INTRALESIONAL; INTRAMUSCULAR; INTRAVENOUS; SOFT TISSUE at 02:10

## 2020-10-06 RX ADMIN — FLUOROURACIL 945 MG: 50 INJECTION, SOLUTION INTRAVENOUS at 03:10

## 2020-10-06 RX ADMIN — LEUCOVORIN CALCIUM 945 MG: 350 INJECTION, POWDER, LYOPHILIZED, FOR SOLUTION INTRAMUSCULAR; INTRAVENOUS at 03:10

## 2020-10-06 RX ADMIN — APREPITANT 130 MG: 130 INJECTION, EMULSION INTRAVENOUS at 03:10

## 2020-10-06 RX ADMIN — FLUOROURACIL 5665 MG: 50 INJECTION, SOLUTION INTRAVENOUS at 03:10

## 2020-10-06 NOTE — PLAN OF CARE
Adequate for discharge.   Pt tolerated infusion without noted distress.  CADD pump secured and running appropriately.  Schedule given and reviewed with pt.  All questions answered. Advised to call MD with any questions or concerns.   Ambulated from infusion center independently by self.

## 2020-10-08 ENCOUNTER — INFUSION (OUTPATIENT)
Dept: INFUSION THERAPY | Facility: HOSPITAL | Age: 50
End: 2020-10-08
Attending: INTERNAL MEDICINE
Payer: COMMERCIAL

## 2020-10-08 VITALS
SYSTOLIC BLOOD PRESSURE: 134 MMHG | TEMPERATURE: 98 F | RESPIRATION RATE: 16 BRPM | DIASTOLIC BLOOD PRESSURE: 77 MMHG | HEART RATE: 68 BPM

## 2020-10-08 DIAGNOSIS — C77.2 METASTASIS TO INTESTINAL LYMPH NODE: Primary | ICD-10-CM

## 2020-10-08 DIAGNOSIS — C18.7 MALIGNANT NEOPLASM OF SIGMOID COLON: ICD-10-CM

## 2020-10-08 PROCEDURE — A4216 STERILE WATER/SALINE, 10 ML: HCPCS | Mod: PN | Performed by: NURSE PRACTITIONER

## 2020-10-08 PROCEDURE — 63600175 PHARM REV CODE 636 W HCPCS: Mod: PN | Performed by: NURSE PRACTITIONER

## 2020-10-08 PROCEDURE — 96523 IRRIG DRUG DELIVERY DEVICE: CPT | Mod: PN

## 2020-10-08 PROCEDURE — 25000003 PHARM REV CODE 250: Mod: PN | Performed by: NURSE PRACTITIONER

## 2020-10-08 RX ORDER — SODIUM CHLORIDE 0.9 % (FLUSH) 0.9 %
10 SYRINGE (ML) INJECTION
Status: DISCONTINUED | OUTPATIENT
Start: 2020-10-08 | End: 2020-10-08 | Stop reason: HOSPADM

## 2020-10-08 RX ORDER — HEPARIN 100 UNIT/ML
500 SYRINGE INTRAVENOUS
Status: DISCONTINUED | OUTPATIENT
Start: 2020-10-08 | End: 2020-10-08 | Stop reason: HOSPADM

## 2020-10-08 RX ADMIN — Medication 10 ML: at 02:10

## 2020-10-08 RX ADMIN — HEPARIN 500 UNITS: 100 SYRINGE at 02:10

## 2020-10-08 NOTE — PLAN OF CARE
Problem: Adult Inpatient Plan of Care  Goal: Plan of Care Review  Outcome: Ongoing, Progressing  Flowsheets (Taken 10/8/2020 3963)  Plan of Care Reviewed With: patient   Pt came in for pump D/C. Pt tolerated home 5FU well and had no complaints. PAC flushed w/ NS and heparin and deaccessed. Pt education reinforced and explained what to expect in the next couple of days. Pt verbalized understanding.

## 2020-10-12 ENCOUNTER — PATIENT MESSAGE (OUTPATIENT)
Dept: FAMILY MEDICINE | Facility: CLINIC | Age: 50
End: 2020-10-12

## 2020-10-14 ENCOUNTER — PATIENT MESSAGE (OUTPATIENT)
Dept: FAMILY MEDICINE | Facility: CLINIC | Age: 50
End: 2020-10-14

## 2020-10-15 ENCOUNTER — PATIENT MESSAGE (OUTPATIENT)
Dept: FAMILY MEDICINE | Facility: CLINIC | Age: 50
End: 2020-10-15

## 2020-10-15 ENCOUNTER — DOCUMENTATION ONLY (OUTPATIENT)
Dept: INFUSION THERAPY | Facility: HOSPITAL | Age: 50
End: 2020-10-15

## 2020-10-15 NOTE — PROGRESS NOTES
[4:12 PM] Noemí Sweeney   -can you add Gail Ino on for Chemo on Wed 10/21--she is going to be switching to Dr. Leyva....Im changing the plan now and going to get him to add the meds  ?[4:12 PM] Noemí Sweeney      [id number]  [4:28 PM] Connie Stapleton      on mrn 10/21/20 the only thing i have is 1:30 that day

## 2020-10-16 ENCOUNTER — PATIENT MESSAGE (OUTPATIENT)
Dept: HEMATOLOGY/ONCOLOGY | Facility: CLINIC | Age: 50
End: 2020-10-16

## 2020-10-16 ENCOUNTER — PATIENT MESSAGE (OUTPATIENT)
Dept: FAMILY MEDICINE | Facility: CLINIC | Age: 50
End: 2020-10-16

## 2020-10-21 ENCOUNTER — INFUSION (OUTPATIENT)
Dept: INFUSION THERAPY | Facility: HOSPITAL | Age: 50
End: 2020-10-21
Attending: INTERNAL MEDICINE
Payer: COMMERCIAL

## 2020-10-21 ENCOUNTER — OFFICE VISIT (OUTPATIENT)
Dept: HEMATOLOGY/ONCOLOGY | Facility: CLINIC | Age: 50
End: 2020-10-21
Payer: COMMERCIAL

## 2020-10-21 VITALS
DIASTOLIC BLOOD PRESSURE: 87 MMHG | BODY MASS INDEX: 44.26 KG/M2 | RESPIRATION RATE: 16 BRPM | HEART RATE: 71 BPM | SYSTOLIC BLOOD PRESSURE: 153 MMHG | TEMPERATURE: 98 F | WEIGHT: 275.38 LBS | OXYGEN SATURATION: 100 % | HEIGHT: 66 IN

## 2020-10-21 VITALS
RESPIRATION RATE: 18 BRPM | DIASTOLIC BLOOD PRESSURE: 78 MMHG | TEMPERATURE: 98 F | BODY MASS INDEX: 44.26 KG/M2 | WEIGHT: 275.38 LBS | OXYGEN SATURATION: 100 % | SYSTOLIC BLOOD PRESSURE: 111 MMHG | HEIGHT: 66 IN | HEART RATE: 65 BPM

## 2020-10-21 DIAGNOSIS — C18.7 MALIGNANT NEOPLASM OF SIGMOID COLON: Primary | ICD-10-CM

## 2020-10-21 DIAGNOSIS — C77.2 METASTASIS TO INTESTINAL LYMPH NODE: ICD-10-CM

## 2020-10-21 DIAGNOSIS — C18.7 MALIGNANT NEOPLASM OF SIGMOID COLON: ICD-10-CM

## 2020-10-21 DIAGNOSIS — C77.2 METASTASIS TO INTESTINAL LYMPH NODE: Primary | ICD-10-CM

## 2020-10-21 PROCEDURE — 25000003 PHARM REV CODE 250: Mod: PN | Performed by: INTERNAL MEDICINE

## 2020-10-21 PROCEDURE — 3008F BODY MASS INDEX DOCD: CPT | Mod: CPTII,S$GLB,, | Performed by: INTERNAL MEDICINE

## 2020-10-21 PROCEDURE — 99999 PR PBB SHADOW E&M-EST. PATIENT-LVL IV: ICD-10-PCS | Mod: PBBFAC,,, | Performed by: INTERNAL MEDICINE

## 2020-10-21 PROCEDURE — 99999 PR PBB SHADOW E&M-EST. PATIENT-LVL IV: CPT | Mod: PBBFAC,,, | Performed by: INTERNAL MEDICINE

## 2020-10-21 PROCEDURE — 96375 TX/PRO/DX INJ NEW DRUG ADDON: CPT | Mod: PN

## 2020-10-21 PROCEDURE — 3074F PR MOST RECENT SYSTOLIC BLOOD PRESSURE < 130 MM HG: ICD-10-PCS | Mod: CPTII,S$GLB,, | Performed by: INTERNAL MEDICINE

## 2020-10-21 PROCEDURE — 3078F DIAST BP <80 MM HG: CPT | Mod: CPTII,S$GLB,, | Performed by: INTERNAL MEDICINE

## 2020-10-21 PROCEDURE — 96416 CHEMO PROLONG INFUSE W/PUMP: CPT | Mod: PN

## 2020-10-21 PROCEDURE — 3074F SYST BP LT 130 MM HG: CPT | Mod: CPTII,S$GLB,, | Performed by: INTERNAL MEDICINE

## 2020-10-21 PROCEDURE — 3078F PR MOST RECENT DIASTOLIC BLOOD PRESSURE < 80 MM HG: ICD-10-PCS | Mod: CPTII,S$GLB,, | Performed by: INTERNAL MEDICINE

## 2020-10-21 PROCEDURE — 3008F PR BODY MASS INDEX (BMI) DOCUMENTED: ICD-10-PCS | Mod: CPTII,S$GLB,, | Performed by: INTERNAL MEDICINE

## 2020-10-21 PROCEDURE — 96367 TX/PROPH/DG ADDL SEQ IV INF: CPT | Mod: PN

## 2020-10-21 PROCEDURE — 99214 PR OFFICE/OUTPT VISIT, EST, LEVL IV, 30-39 MIN: ICD-10-PCS | Mod: S$GLB,,, | Performed by: INTERNAL MEDICINE

## 2020-10-21 PROCEDURE — 63600175 PHARM REV CODE 636 W HCPCS: Mod: PN | Performed by: INTERNAL MEDICINE

## 2020-10-21 PROCEDURE — 99214 OFFICE O/P EST MOD 30 MIN: CPT | Mod: S$GLB,,, | Performed by: INTERNAL MEDICINE

## 2020-10-21 PROCEDURE — 96409 CHEMO IV PUSH SNGL DRUG: CPT | Mod: PN

## 2020-10-21 RX ORDER — EPINEPHRINE 0.3 MG/.3ML
0.3 INJECTION SUBCUTANEOUS ONCE AS NEEDED
Status: CANCELLED | OUTPATIENT
Start: 2020-10-21

## 2020-10-21 RX ORDER — HEPARIN 100 UNIT/ML
500 SYRINGE INTRAVENOUS
Status: CANCELLED | OUTPATIENT
Start: 2020-10-21

## 2020-10-21 RX ORDER — DIPHENHYDRAMINE HYDROCHLORIDE 50 MG/ML
50 INJECTION INTRAMUSCULAR; INTRAVENOUS ONCE AS NEEDED
Status: CANCELLED | OUTPATIENT
Start: 2020-10-21

## 2020-10-21 RX ORDER — SODIUM CHLORIDE 0.9 % (FLUSH) 0.9 %
10 SYRINGE (ML) INJECTION
Status: CANCELLED | OUTPATIENT
Start: 2020-10-21

## 2020-10-21 RX ORDER — DIPHENHYDRAMINE HYDROCHLORIDE 50 MG/ML
50 INJECTION INTRAMUSCULAR; INTRAVENOUS ONCE AS NEEDED
Status: DISCONTINUED | OUTPATIENT
Start: 2020-10-21 | End: 2020-10-21 | Stop reason: HOSPADM

## 2020-10-21 RX ORDER — EPINEPHRINE 0.3 MG/.3ML
0.3 INJECTION SUBCUTANEOUS ONCE AS NEEDED
Status: DISCONTINUED | OUTPATIENT
Start: 2020-10-21 | End: 2020-10-21 | Stop reason: HOSPADM

## 2020-10-21 RX ORDER — FLUOROURACIL 50 MG/ML
400 INJECTION, SOLUTION INTRAVENOUS
Status: CANCELLED | OUTPATIENT
Start: 2020-10-21

## 2020-10-21 RX ORDER — SODIUM CHLORIDE 0.9 % (FLUSH) 0.9 %
10 SYRINGE (ML) INJECTION
Status: DISCONTINUED | OUTPATIENT
Start: 2020-10-21 | End: 2020-10-21 | Stop reason: HOSPADM

## 2020-10-21 RX ORDER — FLUOROURACIL 50 MG/ML
400 INJECTION, SOLUTION INTRAVENOUS
Status: COMPLETED | OUTPATIENT
Start: 2020-10-21 | End: 2020-10-21

## 2020-10-21 RX ORDER — HEPARIN 100 UNIT/ML
500 SYRINGE INTRAVENOUS
Status: CANCELLED | OUTPATIENT
Start: 2020-10-23

## 2020-10-21 RX ORDER — SODIUM CHLORIDE 0.9 % (FLUSH) 0.9 %
10 SYRINGE (ML) INJECTION
Status: CANCELLED | OUTPATIENT
Start: 2020-10-23

## 2020-10-21 RX ADMIN — DEXAMETHASONE SODIUM PHOSPHATE: 4 INJECTION, SOLUTION INTRA-ARTICULAR; INTRALESIONAL; INTRAMUSCULAR; INTRAVENOUS; SOFT TISSUE at 02:10

## 2020-10-21 RX ADMIN — APREPITANT 130 MG: 130 INJECTION, EMULSION INTRAVENOUS at 02:10

## 2020-10-21 RX ADMIN — FLUOROURACIL 945 MG: 50 INJECTION, SOLUTION INTRAVENOUS at 03:10

## 2020-10-21 RX ADMIN — FLUOROURACIL 5665 MG: 50 INJECTION, SOLUTION INTRAVENOUS at 03:10

## 2020-10-21 RX ADMIN — SODIUM CHLORIDE: 9 INJECTION, SOLUTION INTRAVENOUS at 02:10

## 2020-10-21 RX ADMIN — LEUCOVORIN CALCIUM 945 MG: 350 INJECTION, POWDER, LYOPHILIZED, FOR SOLUTION INTRAMUSCULAR; INTRAVENOUS at 02:10

## 2020-10-21 NOTE — PLAN OF CARE
Problem: Adult Inpatient Plan of Care  Goal: Plan of Care Review  Outcome: Ongoing, Progressing  Flowsheets (Taken 10/21/2020 1535)  Plan of Care Reviewed With: patient  Goal: Patient-Specific Goal (Individualization)  Outcome: Ongoing, Progressing  Flowsheets (Taken 10/21/2020 1500)  Individualized Care Needs: recliner, warm blanket, TV, water  Anxieties, Fears or Concerns: none  Patient-Specific Goals (Include Timeframe): no s/s of rx during tx

## 2020-10-21 NOTE — PLAN OF CARE
Pt tolerated leucovorin/5fu well. No adverse reaction noted. Pt education reinforced on chemo regimen, side effects, what to expect, and when to call physician. Pt verbalized understanding. I reviewed pt calendar w/ pt and understanding verbalized. PAC remains accessed with 5FU infusing at 5.2cc/hr over 46 hours. Patent upon discharge.

## 2020-10-21 NOTE — PROGRESS NOTES
History of present illness:  The patient is a 50-year-old white female, followed by Dr. Dove, for a diagnosis of stage III adenocarcinoma the colon.  Patient is receiving adjuvant chemotherapy.  FOLFOX was taken out of her regimen after infusion reactions.  Patient returns to clinic for cycle 13 of therapy and continues to experience some difficulties with constipation.  She has started using lactulose with some palliation of symptoms.  Patient is not experiencing difficulty with mouth sores, diarrhea, nausea, vomiting, fevers, chills, etc..  No other complaints for pertinent findings on a 14 point review systems.    Physical examination:  The patient is a well-developed, obese, white female, no acute distress, with a weight of 275.5 lb (increased by 3 lb).  VITAL SIGNS: Documented  and reviewed this visit.  HEENT: Normocephalic, atraumatic. Oral mucosa pink and moist. Lips without   lesions. Tongue midline. Oropharynx clear. Nonicteric sclerae.   NECK: Supple, no adenopathy. No carotid bruits, thyromegaly or thyroid nodule.   HEART: Regular rate and rhythm without murmur, gallop or rub.   LUNGS: Clear to auscultation bilaterally. Normal respiratory effort.   ABDOMEN: Soft, nontender, nondistended with positive normoactive bowel sounds,   no hepatosplenomegaly.   EXTREMITIES: No cyanosis, clubbing or edema. Distal pulses are intact.   AXILLAE AND GROIN: No palpable pathologic lymphadenopathy is appreciated.   SKIN: Intact/turgor normal   NEUROLOGIC: Cranial nerves II-XII grossly intact. Motor: Good muscle bulk and   tone. Strength/sensory 5/5 throughout. Gait stable.     Laboratory:  White count 5.6, hemoglobin 11.9, hematocrit 38.9, platelets 346, absolute neutrophil count is 2300.  Sodium 139, potassium 4, chloride 107, CO2 25, BUN 14, creatinine 0.9, glucose 96, calcium 10.4, magnesium 2.2, liver function test within normal limits, GFR greater than 60.    Impression:  1.  Stage III adenocarcinoma the  colon.  2.  Oxaliplatin infusion reaction.  3.  Situational anxiety-stable.   4.  Constipation.    Plan:  1.  Continue with 13th cycle of therapy minus oxaliplatin.  2.  Return to clinic 2 weeks from now with interval CBC, CMP, LDH, and magnesium at which time will proceed with 14th and final cycle of therapy.  3.  Continue use of lactulose for relief of constipation.    This note was created using voice recognition software and may contain grammatical errors.

## 2020-10-23 ENCOUNTER — INFUSION (OUTPATIENT)
Dept: INFUSION THERAPY | Facility: HOSPITAL | Age: 50
End: 2020-10-23
Attending: INTERNAL MEDICINE
Payer: COMMERCIAL

## 2020-10-23 VITALS
RESPIRATION RATE: 16 BRPM | SYSTOLIC BLOOD PRESSURE: 133 MMHG | HEART RATE: 89 BPM | DIASTOLIC BLOOD PRESSURE: 81 MMHG | TEMPERATURE: 98 F

## 2020-10-23 DIAGNOSIS — C18.7 MALIGNANT NEOPLASM OF SIGMOID COLON: ICD-10-CM

## 2020-10-23 DIAGNOSIS — C77.2 METASTASIS TO INTESTINAL LYMPH NODE: Primary | ICD-10-CM

## 2020-10-23 PROCEDURE — 96523 IRRIG DRUG DELIVERY DEVICE: CPT | Mod: PN

## 2020-10-23 PROCEDURE — A4216 STERILE WATER/SALINE, 10 ML: HCPCS | Mod: PN | Performed by: INTERNAL MEDICINE

## 2020-10-23 PROCEDURE — 25000003 PHARM REV CODE 250: Mod: PN | Performed by: INTERNAL MEDICINE

## 2020-10-23 PROCEDURE — 63600175 PHARM REV CODE 636 W HCPCS: Mod: PN | Performed by: INTERNAL MEDICINE

## 2020-10-23 RX ORDER — HEPARIN 100 UNIT/ML
500 SYRINGE INTRAVENOUS
Status: DISCONTINUED | OUTPATIENT
Start: 2020-10-23 | End: 2020-10-23 | Stop reason: HOSPADM

## 2020-10-23 RX ORDER — SODIUM CHLORIDE 0.9 % (FLUSH) 0.9 %
10 SYRINGE (ML) INJECTION
Status: DISCONTINUED | OUTPATIENT
Start: 2020-10-23 | End: 2020-10-23 | Stop reason: HOSPADM

## 2020-10-23 RX ADMIN — Medication 10 ML: at 04:10

## 2020-10-23 RX ADMIN — HEPARIN SODIUM (PORCINE) LOCK FLUSH IV SOLN 100 UNIT/ML 500 UNITS: 100 SOLUTION at 04:10

## 2020-10-28 ENCOUNTER — PATIENT MESSAGE (OUTPATIENT)
Dept: HEMATOLOGY/ONCOLOGY | Facility: CLINIC | Age: 50
End: 2020-10-28

## 2020-10-29 ENCOUNTER — OFFICE VISIT (OUTPATIENT)
Dept: HEMATOLOGY/ONCOLOGY | Facility: CLINIC | Age: 50
End: 2020-10-29
Payer: COMMERCIAL

## 2020-10-29 DIAGNOSIS — C77.2 METASTASIS TO INTESTINAL LYMPH NODE: ICD-10-CM

## 2020-10-29 DIAGNOSIS — K59.09 OTHER CONSTIPATION: ICD-10-CM

## 2020-10-29 DIAGNOSIS — K12.31 MUCOSITIS DUE TO CHEMOTHERAPY: ICD-10-CM

## 2020-10-29 DIAGNOSIS — C18.7 MALIGNANT NEOPLASM OF SIGMOID COLON: Primary | ICD-10-CM

## 2020-10-29 PROCEDURE — 99213 PR OFFICE/OUTPT VISIT, EST, LEVL III, 20-29 MIN: ICD-10-PCS | Mod: 95,,, | Performed by: INTERNAL MEDICINE

## 2020-10-29 PROCEDURE — 99213 OFFICE O/P EST LOW 20 MIN: CPT | Mod: 95,,, | Performed by: INTERNAL MEDICINE

## 2020-10-29 NOTE — PROGRESS NOTES
History of present illness:  The patient is a 50-year-old white female, followed by Dr. Dove, for a diagnosis of stage III adenocarcinoma the colon.  Patient is receiving adjuvant chemotherapy.  FOLFOX was taken out of her regimen after infusion reactions.  Patient returns to clinic dear than her scheduled follow-up visit having developed difficulty with sensitivity of her tongue.  Patient has been doing salt soda rinses without improvement.  She has not had difficulties such as this with prior cycles of therapy.  She denies significant postnasal drip, fevers, chills, cough, nasal congestion, etcetera.  No other complaints for pertinent findings on a 10 point review systems.    Physical examination:  The patient is a well-developed, obese, white female, no acute distress, who is alert and oriented x4.  The remainder the patient's physical examination is deferred as this is a virtual encounter.      Impression:  1.  Stage III adenocarcinoma the colon.  2.  Oxaliplatin infusion reaction.  3.  Situational anxiety-stable.   4.  Constipation.  5.  Grade 1 therapy associated mucositis.    Plan:  1.  Magic mouthwash swish and swallow every 4 hr as needed for oral cavity/3 pain.  2.  Keep previously scheduled office follow-up for final cycle of adjuvant therapy.    This note was created using voice recognition software and may contain grammatical errors.

## 2020-10-30 ENCOUNTER — TELEPHONE (OUTPATIENT)
Dept: HEMATOLOGY/ONCOLOGY | Facility: CLINIC | Age: 50
End: 2020-10-30

## 2020-10-30 NOTE — TELEPHONE ENCOUNTER
Called patient, no answer, left message on vm as a reminder to have labs done prior to 11/4/20 appt, I have rescheduled to Monday but can schedule 1 hour prior to her follow up on 11/4, asked that she call back to discuss.

## 2020-11-02 ENCOUNTER — PATIENT MESSAGE (OUTPATIENT)
Dept: HEMATOLOGY/ONCOLOGY | Facility: CLINIC | Age: 50
End: 2020-11-02

## 2020-11-02 ENCOUNTER — LAB VISIT (OUTPATIENT)
Dept: LAB | Facility: HOSPITAL | Age: 50
End: 2020-11-02
Attending: INTERNAL MEDICINE
Payer: COMMERCIAL

## 2020-11-02 DIAGNOSIS — C77.2 METASTASIS TO INTESTINAL LYMPH NODE: ICD-10-CM

## 2020-11-02 DIAGNOSIS — C18.7 MALIGNANT NEOPLASM OF SIGMOID COLON: ICD-10-CM

## 2020-11-02 LAB
ALBUMIN SERPL BCP-MCNC: 4.2 G/DL (ref 3.5–5.2)
ALP SERPL-CCNC: 181 U/L (ref 38–145)
ALT SERPL W/O P-5'-P-CCNC: 30 U/L (ref 0–35)
ANION GAP SERPL CALC-SCNC: 7 MMOL/L (ref 8–16)
AST SERPL-CCNC: 31 U/L (ref 14–36)
BASOPHILS # BLD AUTO: 0.03 K/UL (ref 0–0.2)
BASOPHILS NFR BLD: 0.6 % (ref 0–1.9)
BILIRUB SERPL-MCNC: 0.4 MG/DL (ref 0.2–1.3)
BUN SERPL-MCNC: 14 MG/DL (ref 7–18)
CALCIUM SERPL-MCNC: 11 MG/DL (ref 8.4–10.2)
CHLORIDE SERPL-SCNC: 108 MMOL/L (ref 95–110)
CO2 SERPL-SCNC: 26 MMOL/L (ref 22–31)
CREAT SERPL-MCNC: 0.8 MG/DL (ref 0.5–1.4)
DIFFERENTIAL METHOD: ABNORMAL
EOSINOPHIL # BLD AUTO: 0.4 K/UL (ref 0–0.5)
EOSINOPHIL NFR BLD: 7.9 % (ref 0–8)
ERYTHROCYTE [DISTWIDTH] IN BLOOD BY AUTOMATED COUNT: 20.2 % (ref 11.5–14.5)
EST. GFR  (AFRICAN AMERICAN): >60 ML/MIN/1.73 M^2
EST. GFR  (NON AFRICAN AMERICAN): >60 ML/MIN/1.73 M^2
GLUCOSE SERPL-MCNC: 119 MG/DL (ref 70–110)
HCT VFR BLD AUTO: 38.9 % (ref 37–48.5)
HGB BLD-MCNC: 12 G/DL (ref 12–16)
IMM GRANULOCYTES # BLD AUTO: 0.01 K/UL (ref 0–0.04)
IMM GRANULOCYTES NFR BLD AUTO: 0.2 % (ref 0–0.5)
LDH SERPL L TO P-CCNC: 444 U/L (ref 313–618)
LYMPHOCYTES # BLD AUTO: 1.7 K/UL (ref 1–4.8)
LYMPHOCYTES NFR BLD: 33.4 % (ref 18–48)
MAGNESIUM SERPL-MCNC: 2.1 MG/DL (ref 1.6–2.6)
MCH RBC QN AUTO: 26.8 PG (ref 27–31)
MCHC RBC AUTO-ENTMCNC: 30.8 G/DL (ref 32–36)
MCV RBC AUTO: 87 FL (ref 82–98)
MONOCYTES # BLD AUTO: 0.5 K/UL (ref 0.3–1)
MONOCYTES NFR BLD: 10.4 % (ref 4–15)
NEUTROPHILS # BLD AUTO: 2.5 K/UL (ref 1.8–7.7)
NEUTROPHILS NFR BLD: 47.5 % (ref 38–73)
NRBC BLD-RTO: 0 /100 WBC
PLATELET # BLD AUTO: 267 K/UL (ref 150–350)
PMV BLD AUTO: 9.9 FL (ref 9.2–12.9)
POTASSIUM SERPL-SCNC: 3.7 MMOL/L (ref 3.5–5.1)
PROT SERPL-MCNC: 7.1 G/DL (ref 6–8.4)
RBC # BLD AUTO: 4.47 M/UL (ref 4–5.4)
SODIUM SERPL-SCNC: 141 MMOL/L (ref 136–145)
WBC # BLD AUTO: 5.18 K/UL (ref 3.9–12.7)

## 2020-11-02 PROCEDURE — 85025 COMPLETE CBC W/AUTO DIFF WBC: CPT | Mod: PN

## 2020-11-02 PROCEDURE — 80053 COMPREHEN METABOLIC PANEL: CPT | Mod: PN

## 2020-11-02 PROCEDURE — 83615 LACTATE (LD) (LDH) ENZYME: CPT | Mod: PN

## 2020-11-02 PROCEDURE — 83735 ASSAY OF MAGNESIUM: CPT | Mod: PN

## 2020-11-02 PROCEDURE — 83735 ASSAY OF MAGNESIUM: CPT

## 2020-11-02 PROCEDURE — 80053 COMPREHEN METABOLIC PANEL: CPT

## 2020-11-02 PROCEDURE — 85025 COMPLETE CBC W/AUTO DIFF WBC: CPT

## 2020-11-02 PROCEDURE — 36415 COLL VENOUS BLD VENIPUNCTURE: CPT | Mod: PN

## 2020-11-02 PROCEDURE — 83615 LACTATE (LD) (LDH) ENZYME: CPT

## 2020-11-04 ENCOUNTER — INFUSION (OUTPATIENT)
Dept: INFUSION THERAPY | Facility: HOSPITAL | Age: 50
End: 2020-11-04
Attending: INTERNAL MEDICINE
Payer: COMMERCIAL

## 2020-11-04 ENCOUNTER — OFFICE VISIT (OUTPATIENT)
Dept: HEMATOLOGY/ONCOLOGY | Facility: CLINIC | Age: 50
End: 2020-11-04
Payer: COMMERCIAL

## 2020-11-04 VITALS
TEMPERATURE: 98 F | HEIGHT: 66 IN | HEART RATE: 86 BPM | DIASTOLIC BLOOD PRESSURE: 82 MMHG | WEIGHT: 275.81 LBS | OXYGEN SATURATION: 98 % | BODY MASS INDEX: 44.33 KG/M2 | RESPIRATION RATE: 18 BRPM | SYSTOLIC BLOOD PRESSURE: 131 MMHG

## 2020-11-04 VITALS
HEART RATE: 72 BPM | OXYGEN SATURATION: 98 % | BODY MASS INDEX: 44.33 KG/M2 | SYSTOLIC BLOOD PRESSURE: 132 MMHG | TEMPERATURE: 98 F | WEIGHT: 275.81 LBS | DIASTOLIC BLOOD PRESSURE: 86 MMHG | HEIGHT: 66 IN | RESPIRATION RATE: 18 BRPM

## 2020-11-04 DIAGNOSIS — C18.7 MALIGNANT NEOPLASM OF SIGMOID COLON: Primary | ICD-10-CM

## 2020-11-04 DIAGNOSIS — C77.2 METASTASIS TO INTESTINAL LYMPH NODE: Primary | ICD-10-CM

## 2020-11-04 DIAGNOSIS — C18.7 MALIGNANT NEOPLASM OF SIGMOID COLON: ICD-10-CM

## 2020-11-04 PROCEDURE — 99214 OFFICE O/P EST MOD 30 MIN: CPT | Mod: S$GLB,,, | Performed by: INTERNAL MEDICINE

## 2020-11-04 PROCEDURE — 25000003 PHARM REV CODE 250: Mod: PN | Performed by: INTERNAL MEDICINE

## 2020-11-04 PROCEDURE — 96367 TX/PROPH/DG ADDL SEQ IV INF: CPT | Mod: PN

## 2020-11-04 PROCEDURE — 3008F BODY MASS INDEX DOCD: CPT | Mod: CPTII,S$GLB,, | Performed by: INTERNAL MEDICINE

## 2020-11-04 PROCEDURE — 96416 CHEMO PROLONG INFUSE W/PUMP: CPT | Mod: PN

## 2020-11-04 PROCEDURE — 3075F PR MOST RECENT SYSTOLIC BLOOD PRESS GE 130-139MM HG: ICD-10-PCS | Mod: CPTII,S$GLB,, | Performed by: INTERNAL MEDICINE

## 2020-11-04 PROCEDURE — 99214 PR OFFICE/OUTPT VISIT, EST, LEVL IV, 30-39 MIN: ICD-10-PCS | Mod: S$GLB,,, | Performed by: INTERNAL MEDICINE

## 2020-11-04 PROCEDURE — 96375 TX/PRO/DX INJ NEW DRUG ADDON: CPT | Mod: PN

## 2020-11-04 PROCEDURE — 96409 CHEMO IV PUSH SNGL DRUG: CPT | Mod: PN

## 2020-11-04 PROCEDURE — 63600175 PHARM REV CODE 636 W HCPCS: Mod: TB,PN | Performed by: INTERNAL MEDICINE

## 2020-11-04 PROCEDURE — 3008F PR BODY MASS INDEX (BMI) DOCUMENTED: ICD-10-PCS | Mod: CPTII,S$GLB,, | Performed by: INTERNAL MEDICINE

## 2020-11-04 PROCEDURE — 3075F SYST BP GE 130 - 139MM HG: CPT | Mod: CPTII,S$GLB,, | Performed by: INTERNAL MEDICINE

## 2020-11-04 PROCEDURE — 99999 PR PBB SHADOW E&M-EST. PATIENT-LVL IV: CPT | Mod: PBBFAC,,, | Performed by: INTERNAL MEDICINE

## 2020-11-04 PROCEDURE — 3079F PR MOST RECENT DIASTOLIC BLOOD PRESSURE 80-89 MM HG: ICD-10-PCS | Mod: CPTII,S$GLB,, | Performed by: INTERNAL MEDICINE

## 2020-11-04 PROCEDURE — 3079F DIAST BP 80-89 MM HG: CPT | Mod: CPTII,S$GLB,, | Performed by: INTERNAL MEDICINE

## 2020-11-04 PROCEDURE — 99999 PR PBB SHADOW E&M-EST. PATIENT-LVL IV: ICD-10-PCS | Mod: PBBFAC,,, | Performed by: INTERNAL MEDICINE

## 2020-11-04 RX ORDER — HEPARIN 100 UNIT/ML
500 SYRINGE INTRAVENOUS
Status: DISCONTINUED | OUTPATIENT
Start: 2020-11-04 | End: 2020-11-04 | Stop reason: HOSPADM

## 2020-11-04 RX ORDER — EPINEPHRINE 0.3 MG/.3ML
0.3 INJECTION SUBCUTANEOUS ONCE AS NEEDED
Status: DISCONTINUED | OUTPATIENT
Start: 2020-11-04 | End: 2020-11-04 | Stop reason: HOSPADM

## 2020-11-04 RX ORDER — FLUOROURACIL 50 MG/ML
400 INJECTION, SOLUTION INTRAVENOUS
Status: COMPLETED | OUTPATIENT
Start: 2020-11-04 | End: 2020-11-04

## 2020-11-04 RX ORDER — DIPHENHYDRAMINE HYDROCHLORIDE 50 MG/ML
50 INJECTION INTRAMUSCULAR; INTRAVENOUS ONCE AS NEEDED
Status: CANCELLED | OUTPATIENT
Start: 2020-11-04

## 2020-11-04 RX ORDER — LACTULOSE 10 G/15ML
20 SOLUTION ORAL DAILY PRN
Qty: 500 ML | Refills: 2 | Status: SHIPPED | OUTPATIENT
Start: 2020-11-04 | End: 2021-10-19 | Stop reason: SDUPTHER

## 2020-11-04 RX ORDER — EPINEPHRINE 0.3 MG/.3ML
0.3 INJECTION SUBCUTANEOUS ONCE AS NEEDED
Status: CANCELLED | OUTPATIENT
Start: 2020-11-04

## 2020-11-04 RX ORDER — SODIUM CHLORIDE 0.9 % (FLUSH) 0.9 %
10 SYRINGE (ML) INJECTION
Status: CANCELLED | OUTPATIENT
Start: 2020-11-04

## 2020-11-04 RX ORDER — DIPHENHYDRAMINE HYDROCHLORIDE 50 MG/ML
50 INJECTION INTRAMUSCULAR; INTRAVENOUS ONCE AS NEEDED
Status: DISCONTINUED | OUTPATIENT
Start: 2020-11-04 | End: 2020-11-04 | Stop reason: HOSPADM

## 2020-11-04 RX ORDER — SODIUM CHLORIDE 0.9 % (FLUSH) 0.9 %
10 SYRINGE (ML) INJECTION
Status: DISCONTINUED | OUTPATIENT
Start: 2020-11-04 | End: 2020-11-04 | Stop reason: HOSPADM

## 2020-11-04 RX ORDER — FLUOROURACIL 50 MG/ML
400 INJECTION, SOLUTION INTRAVENOUS
Status: CANCELLED | OUTPATIENT
Start: 2020-11-04

## 2020-11-04 RX ORDER — HEPARIN 100 UNIT/ML
500 SYRINGE INTRAVENOUS
Status: CANCELLED | OUTPATIENT
Start: 2020-11-04

## 2020-11-04 RX ORDER — SODIUM CHLORIDE 0.9 % (FLUSH) 0.9 %
10 SYRINGE (ML) INJECTION
Status: CANCELLED | OUTPATIENT
Start: 2020-11-06

## 2020-11-04 RX ORDER — HEPARIN 100 UNIT/ML
500 SYRINGE INTRAVENOUS
Status: CANCELLED | OUTPATIENT
Start: 2020-11-06

## 2020-11-04 RX ADMIN — DEXAMETHASONE SODIUM PHOSPHATE: 4 INJECTION, SOLUTION INTRA-ARTICULAR; INTRALESIONAL; INTRAMUSCULAR; INTRAVENOUS; SOFT TISSUE at 10:11

## 2020-11-04 RX ADMIN — APREPITANT 130 MG: 130 INJECTION, EMULSION INTRAVENOUS at 10:11

## 2020-11-04 RX ADMIN — FLUOROURACIL 945 MG: 50 INJECTION, SOLUTION INTRAVENOUS at 11:11

## 2020-11-04 RX ADMIN — FLUOROURACIL 5665 MG: 50 INJECTION, SOLUTION INTRAVENOUS at 11:11

## 2020-11-04 RX ADMIN — SODIUM CHLORIDE: 9 INJECTION, SOLUTION INTRAVENOUS at 09:11

## 2020-11-04 RX ADMIN — LEUCOVORIN CALCIUM 945 MG: 350 INJECTION, POWDER, LYOPHILIZED, FOR SOLUTION INTRAMUSCULAR; INTRAVENOUS at 10:11

## 2020-11-04 NOTE — PROGRESS NOTES
History of present illness:  The patient is a 50-year-old white female, followed by Dr. Dove, for a diagnosis of stage III adenocarcinoma the colon.  Patient is receiving adjuvant chemotherapy.  FOLFOX was taken out of her regimen after infusion reactions.  Patient returns to clinic for cycle 14 of therapy and continues to experience some difficulties with constipation.  She has started using lactulose with good palliation of symptoms.  She requests refill of this medication.  Difficulties the patient was experiencing with mucositis have resolved through use of Magic mouthwash.  Patient is not experiencing difficulty with mouth sores, diarrhea, nausea, vomiting, fevers, chills, etc..  No other complaints for pertinent findings on a 14 point review systems.    Physical examination:  The patient is a well-developed, obese, white female, no acute distress, with a weight of 275.5 lb (stable).  VITAL SIGNS: Documented  and reviewed this visit.  HEENT: Normocephalic, atraumatic. Oral mucosa pink and moist. Lips without   lesions. Tongue midline. Oropharynx clear. Nonicteric sclerae.   NECK: Supple, no adenopathy. No carotid bruits, thyromegaly or thyroid nodule.   HEART: Regular rate and rhythm without murmur, gallop or rub.   LUNGS: Clear to auscultation bilaterally. Normal respiratory effort.   ABDOMEN: Soft, nontender, nondistended with positive normoactive bowel sounds,   no hepatosplenomegaly.   EXTREMITIES: No cyanosis, clubbing or edema. Distal pulses are intact.   AXILLAE AND GROIN: No palpable pathologic lymphadenopathy is appreciated.   SKIN: Intact/turgor normal   NEUROLOGIC: Cranial nerves II-XII grossly intact. Motor: Good muscle bulk and   tone. Strength/sensory 5/5 throughout. Gait stable.     Laboratory:  White count 5.2, hemoglobin 12, hematocrit 38.9, platelets 267, absolute neutrophil count is 2500.  Sodium 141, potassium 3.7, chloride 108, CO2 26 BUN 14, creatinine 0.8, glucose 119, calcium 11,  magnesium 2.1, alkaline phosphatase 181, liver function test within normal limits, , GFR is greater than 60.    Impression:  1.  Stage III adenocarcinoma the colon.  2.  Oxaliplatin infusion reaction.  3.  Situational anxiety-stable.   4.  Constipation well palliated 3 use of lactulose.    Plan:  1.  Continue with 14th/ cycle of therapy minus oxaliplatin.  2.  Return to clinic 3 months from now without interval study for surveillance examination.    3.  Continue use of lactulose for relief of constipation.  4.  Survivorship clinic referral.    This note was created using voice recognition software and may contain grammatical errors.

## 2020-11-04 NOTE — PLAN OF CARE
Problem: Adult Inpatient Plan of Care  Goal: Plan of Care Review  Outcome: Ongoing, Progressing  Flowsheets (Taken 11/4/2020 3640)  Plan of Care Reviewed With: patient   Pt tolerated Leucovorin and 5FU well. No adverse reaction noted. Pt education reinforced on chemo regimen, side effects, what to expect, and when to call physician. Pt verbalized understanding. I reviewed pt calendar w/ pt and understanding verbalized. PAC remains accessed with 5FU infusing at 5.2cc/hr over 46 hours. Patent upon discharge.

## 2020-11-04 NOTE — PLAN OF CARE
Problem: Adult Inpatient Plan of Care  Goal: Patient-Specific Goal (Individualization)  Outcome: Ongoing, Progressing  Flowsheets (Taken 11/4/2020 0947)  Individualized Care Needs: recliner  Anxieties, Fears or Concerns: none  Patient-Specific Goals (Include Timeframe): no s/s of rx during tx     Problem: Fatigue (Oncology Care)  Goal: Improved Activity Tolerance  Outcome: Ongoing, Progressing  Intervention: Promote Energy Conservation  Flowsheets (Taken 11/4/2020 0902)  Fatigue Management: frequent rest breaks encouraged  Sleep/Rest Enhancement: regular sleep/rest pattern promoted  Activity Management:   ambulated in reyes - L4   up in chair - L3

## 2020-11-05 NOTE — PROGRESS NOTES
Oncology Nutrition   Chemotherapy Infusion Visit  Gail Mckinnon   1970    Nutrition Education   This is a 50 y.o.female with a medical diagnosis of malignant neoplasm of sigmoid colon.   Met w/ pt for last day of infusion. Pt reports doing great, main c/o fatigue. Pt w/ continued good appetite, eats meals and grazes all day, remaining hydrated. Pt also c/o constipation. Encouraged fluids, fiber and increased activity. Pt concerned about weight gain. Pt stated that starting next week she is going to begin meal prepping and focusing on her nutritional intake to begin a weight loss journey. Encouraged overall support and recommendations of small goals. Provided pt w/ contact information and encouraged her to reach out to me with any questions, concerns or if needs of guidance. Pt v/u    EARLE WISE MA, RD, LDN  11/05/2020  7:38 AM

## 2020-11-06 ENCOUNTER — INFUSION (OUTPATIENT)
Dept: INFUSION THERAPY | Facility: HOSPITAL | Age: 50
End: 2020-11-06
Attending: INTERNAL MEDICINE
Payer: COMMERCIAL

## 2020-11-06 VITALS
HEART RATE: 100 BPM | DIASTOLIC BLOOD PRESSURE: 85 MMHG | SYSTOLIC BLOOD PRESSURE: 145 MMHG | RESPIRATION RATE: 20 BRPM | TEMPERATURE: 98 F

## 2020-11-06 DIAGNOSIS — C18.7 MALIGNANT NEOPLASM OF SIGMOID COLON: ICD-10-CM

## 2020-11-06 DIAGNOSIS — C77.2 METASTASIS TO INTESTINAL LYMPH NODE: Primary | ICD-10-CM

## 2020-11-06 PROCEDURE — A4216 STERILE WATER/SALINE, 10 ML: HCPCS | Mod: PN | Performed by: INTERNAL MEDICINE

## 2020-11-06 PROCEDURE — 96523 IRRIG DRUG DELIVERY DEVICE: CPT | Mod: PN

## 2020-11-06 PROCEDURE — 25000003 PHARM REV CODE 250: Mod: PN | Performed by: INTERNAL MEDICINE

## 2020-11-06 RX ORDER — SODIUM CHLORIDE 0.9 % (FLUSH) 0.9 %
10 SYRINGE (ML) INJECTION
Status: DISCONTINUED | OUTPATIENT
Start: 2020-11-06 | End: 2020-11-06 | Stop reason: HOSPADM

## 2020-11-06 RX ADMIN — Medication 10 ML: at 11:11

## 2020-11-10 ENCOUNTER — PATIENT OUTREACH (OUTPATIENT)
Dept: ADMINISTRATIVE | Facility: HOSPITAL | Age: 50
End: 2020-11-10

## 2020-11-10 NOTE — PROGRESS NOTES
Non-compliant GAP report chart review - Chart review completed for the following HM test if overdue  (Mammogram, Colonoscopy, Cervical Cancer Screening,  Diabetic lab testing, and/or Dilated EYE EXAM)  11/10/2020    Care Everywhere and Media reports - updates requested and reviewed.        DIS reviewed for test needed.  Mammogram        Health Maintenance Due   Topic Date Due    Hepatitis C Screening  1970    HIV Screening  04/07/1983    Pneumococcal Vaccine (Highest Risk) (1 of 3 - PCV13) 04/07/1987    Shingles Vaccine (1 of 2) 04/07/2018    Mammogram  10/18/2018    Influenza Vaccine (1) 08/01/2020

## 2020-11-23 ENCOUNTER — PATIENT MESSAGE (OUTPATIENT)
Dept: HEMATOLOGY/ONCOLOGY | Facility: CLINIC | Age: 50
End: 2020-11-23

## 2020-11-24 ENCOUNTER — PATIENT MESSAGE (OUTPATIENT)
Dept: HEMATOLOGY/ONCOLOGY | Facility: CLINIC | Age: 50
End: 2020-11-24

## 2020-11-25 DIAGNOSIS — C18.7 MALIGNANT NEOPLASM OF SIGMOID COLON: Primary | ICD-10-CM

## 2020-11-30 ENCOUNTER — HOSPITAL ENCOUNTER (OUTPATIENT)
Dept: RADIOLOGY | Facility: HOSPITAL | Age: 50
Discharge: HOME | End: 2020-11-30
Attending: INTERNAL MEDICINE
Payer: COMMERCIAL

## 2020-11-30 DIAGNOSIS — C18.7 MALIGNANT NEOPLASM OF SIGMOID COLON: ICD-10-CM

## 2020-11-30 PROCEDURE — A9698 NON-RAD CONTRAST MATERIALNOC: HCPCS | Mod: PO | Performed by: INTERNAL MEDICINE

## 2020-11-30 PROCEDURE — 74177 CT ABD & PELVIS W/CONTRAST: CPT | Mod: TC,PO

## 2020-11-30 PROCEDURE — 25500020 PHARM REV CODE 255: Mod: PO | Performed by: INTERNAL MEDICINE

## 2020-11-30 PROCEDURE — 74177 CT ABD & PELVIS W/CONTRAST: CPT | Mod: 26,,, | Performed by: RADIOLOGY

## 2020-11-30 PROCEDURE — 74177 CT ABDOMEN PELVIS WITH CONTRAST: ICD-10-PCS | Mod: 26,,, | Performed by: RADIOLOGY

## 2020-11-30 RX ADMIN — IOHEXOL 100 ML: 350 INJECTION, SOLUTION INTRAVENOUS at 04:11

## 2020-11-30 RX ADMIN — IOHEXOL 1000 ML: 12 SOLUTION ORAL at 04:11

## 2020-12-01 ENCOUNTER — PATIENT MESSAGE (OUTPATIENT)
Dept: HEMATOLOGY/ONCOLOGY | Facility: CLINIC | Age: 50
End: 2020-12-01

## 2020-12-03 ENCOUNTER — TELEPHONE (OUTPATIENT)
Dept: HEMATOLOGY/ONCOLOGY | Facility: CLINIC | Age: 50
End: 2020-12-03

## 2020-12-11 RX ORDER — SODIUM CHLORIDE 0.9 % (FLUSH) 0.9 %
10 SYRINGE (ML) INJECTION
Status: CANCELLED | OUTPATIENT
Start: 2020-12-14

## 2020-12-11 RX ORDER — HEPARIN 100 UNIT/ML
500 SYRINGE INTRAVENOUS
Status: CANCELLED | OUTPATIENT
Start: 2020-12-14

## 2020-12-16 ENCOUNTER — PATIENT MESSAGE (OUTPATIENT)
Dept: HEMATOLOGY/ONCOLOGY | Facility: CLINIC | Age: 50
End: 2020-12-16

## 2020-12-16 ENCOUNTER — INFUSION (OUTPATIENT)
Dept: INFUSION THERAPY | Facility: HOSPITAL | Age: 50
End: 2020-12-16
Attending: INTERNAL MEDICINE
Payer: COMMERCIAL

## 2020-12-16 DIAGNOSIS — C18.7 MALIGNANT NEOPLASM OF SIGMOID COLON: Primary | ICD-10-CM

## 2020-12-16 DIAGNOSIS — C18.7 MALIGNANT NEOPLASM OF SIGMOID COLON: ICD-10-CM

## 2020-12-16 DIAGNOSIS — M62.838 NIGHT MUSCLE SPASMS: Primary | ICD-10-CM

## 2020-12-16 PROCEDURE — 25000003 PHARM REV CODE 250: Mod: PN | Performed by: INTERNAL MEDICINE

## 2020-12-16 PROCEDURE — A4216 STERILE WATER/SALINE, 10 ML: HCPCS | Mod: PN | Performed by: INTERNAL MEDICINE

## 2020-12-16 PROCEDURE — 63600175 PHARM REV CODE 636 W HCPCS: Mod: PN | Performed by: INTERNAL MEDICINE

## 2020-12-16 PROCEDURE — 96523 IRRIG DRUG DELIVERY DEVICE: CPT | Mod: PN

## 2020-12-16 RX ORDER — HEPARIN 100 UNIT/ML
500 SYRINGE INTRAVENOUS
Status: CANCELLED | OUTPATIENT
Start: 2020-12-16

## 2020-12-16 RX ORDER — CYCLOBENZAPRINE HCL 10 MG
10 TABLET ORAL NIGHTLY
Qty: 30 TABLET | Refills: 0 | Status: SHIPPED | OUTPATIENT
Start: 2020-12-16 | End: 2021-12-15 | Stop reason: SDUPTHER

## 2020-12-16 RX ORDER — HEPARIN 100 UNIT/ML
500 SYRINGE INTRAVENOUS
Status: DISCONTINUED | OUTPATIENT
Start: 2020-12-16 | End: 2020-12-16 | Stop reason: HOSPADM

## 2020-12-16 RX ORDER — SODIUM CHLORIDE 0.9 % (FLUSH) 0.9 %
10 SYRINGE (ML) INJECTION
Status: CANCELLED | OUTPATIENT
Start: 2020-12-16

## 2020-12-16 RX ORDER — SODIUM CHLORIDE 0.9 % (FLUSH) 0.9 %
10 SYRINGE (ML) INJECTION
Status: DISCONTINUED | OUTPATIENT
Start: 2020-12-16 | End: 2020-12-16 | Stop reason: HOSPADM

## 2020-12-16 RX ADMIN — Medication 10 ML: at 12:12

## 2020-12-16 RX ADMIN — HEPARIN 500 UNITS: 100 SYRINGE at 12:12

## 2021-01-04 ENCOUNTER — PATIENT MESSAGE (OUTPATIENT)
Dept: ENDOCRINOLOGY | Facility: CLINIC | Age: 51
End: 2021-01-04

## 2021-01-04 ENCOUNTER — PATIENT OUTREACH (OUTPATIENT)
Dept: ADMINISTRATIVE | Facility: OTHER | Age: 51
End: 2021-01-04

## 2021-01-04 DIAGNOSIS — E03.8 OTHER SPECIFIED HYPOTHYROIDISM: Primary | Chronic | ICD-10-CM

## 2021-01-06 ENCOUNTER — LAB VISIT (OUTPATIENT)
Dept: LAB | Facility: HOSPITAL | Age: 51
End: 2021-01-06
Attending: INTERNAL MEDICINE
Payer: COMMERCIAL

## 2021-01-06 ENCOUNTER — OFFICE VISIT (OUTPATIENT)
Dept: ENDOCRINOLOGY | Facility: CLINIC | Age: 51
End: 2021-01-06
Payer: COMMERCIAL

## 2021-01-06 VITALS
WEIGHT: 287.81 LBS | DIASTOLIC BLOOD PRESSURE: 72 MMHG | OXYGEN SATURATION: 99 % | HEART RATE: 87 BPM | HEIGHT: 66 IN | BODY MASS INDEX: 46.25 KG/M2 | SYSTOLIC BLOOD PRESSURE: 128 MMHG

## 2021-01-06 DIAGNOSIS — E03.8 OTHER SPECIFIED HYPOTHYROIDISM: Chronic | ICD-10-CM

## 2021-01-06 DIAGNOSIS — E03.8 OTHER SPECIFIED HYPOTHYROIDISM: Primary | ICD-10-CM

## 2021-01-06 DIAGNOSIS — E04.2 MULTINODULAR GOITER: ICD-10-CM

## 2021-01-06 LAB — TSH SERPL DL<=0.005 MIU/L-ACNC: 0.98 UIU/ML (ref 0.4–4)

## 2021-01-06 PROCEDURE — 3078F DIAST BP <80 MM HG: CPT | Mod: CPTII,S$GLB,, | Performed by: INTERNAL MEDICINE

## 2021-01-06 PROCEDURE — 36415 COLL VENOUS BLD VENIPUNCTURE: CPT | Mod: PO

## 2021-01-06 PROCEDURE — 1126F PR PAIN SEVERITY QUANTIFIED, NO PAIN PRESENT: ICD-10-PCS | Mod: S$GLB,,, | Performed by: INTERNAL MEDICINE

## 2021-01-06 PROCEDURE — 84443 ASSAY THYROID STIM HORMONE: CPT

## 2021-01-06 PROCEDURE — 3074F PR MOST RECENT SYSTOLIC BLOOD PRESSURE < 130 MM HG: ICD-10-PCS | Mod: CPTII,S$GLB,, | Performed by: INTERNAL MEDICINE

## 2021-01-06 PROCEDURE — 3078F PR MOST RECENT DIASTOLIC BLOOD PRESSURE < 80 MM HG: ICD-10-PCS | Mod: CPTII,S$GLB,, | Performed by: INTERNAL MEDICINE

## 2021-01-06 PROCEDURE — 99999 PR PBB SHADOW E&M-EST. PATIENT-LVL IV: CPT | Mod: PBBFAC,,, | Performed by: INTERNAL MEDICINE

## 2021-01-06 PROCEDURE — 3074F SYST BP LT 130 MM HG: CPT | Mod: CPTII,S$GLB,, | Performed by: INTERNAL MEDICINE

## 2021-01-06 PROCEDURE — 99213 PR OFFICE/OUTPT VISIT, EST, LEVL III, 20-29 MIN: ICD-10-PCS | Mod: S$GLB,,, | Performed by: INTERNAL MEDICINE

## 2021-01-06 PROCEDURE — 1126F AMNT PAIN NOTED NONE PRSNT: CPT | Mod: S$GLB,,, | Performed by: INTERNAL MEDICINE

## 2021-01-06 PROCEDURE — 3008F BODY MASS INDEX DOCD: CPT | Mod: CPTII,S$GLB,, | Performed by: INTERNAL MEDICINE

## 2021-01-06 PROCEDURE — 3008F PR BODY MASS INDEX (BMI) DOCUMENTED: ICD-10-PCS | Mod: CPTII,S$GLB,, | Performed by: INTERNAL MEDICINE

## 2021-01-06 PROCEDURE — 99999 PR PBB SHADOW E&M-EST. PATIENT-LVL IV: ICD-10-PCS | Mod: PBBFAC,,, | Performed by: INTERNAL MEDICINE

## 2021-01-06 PROCEDURE — 99213 OFFICE O/P EST LOW 20 MIN: CPT | Mod: S$GLB,,, | Performed by: INTERNAL MEDICINE

## 2021-01-07 ENCOUNTER — PATIENT MESSAGE (OUTPATIENT)
Dept: ENDOCRINOLOGY | Facility: CLINIC | Age: 51
End: 2021-01-07

## 2021-01-07 RX ORDER — LEVOTHYROXINE SODIUM 200 UG/1
200 TABLET ORAL DAILY
Qty: 90 TABLET | Refills: 3 | Status: SHIPPED | OUTPATIENT
Start: 2021-01-07 | End: 2022-04-07 | Stop reason: SDUPTHER

## 2021-01-11 ENCOUNTER — PATIENT MESSAGE (OUTPATIENT)
Dept: ENDOCRINOLOGY | Facility: CLINIC | Age: 51
End: 2021-01-11

## 2021-01-22 ENCOUNTER — PATIENT MESSAGE (OUTPATIENT)
Dept: HEMATOLOGY/ONCOLOGY | Facility: CLINIC | Age: 51
End: 2021-01-22

## 2021-01-22 ENCOUNTER — TELEPHONE (OUTPATIENT)
Dept: HEMATOLOGY/ONCOLOGY | Facility: CLINIC | Age: 51
End: 2021-01-22

## 2021-01-26 ENCOUNTER — PATIENT MESSAGE (OUTPATIENT)
Dept: ENDOCRINOLOGY | Facility: CLINIC | Age: 51
End: 2021-01-26

## 2021-01-27 ENCOUNTER — INFUSION (OUTPATIENT)
Dept: INFUSION THERAPY | Facility: HOSPITAL | Age: 51
End: 2021-01-27
Attending: INTERNAL MEDICINE
Payer: COMMERCIAL

## 2021-01-27 DIAGNOSIS — C18.7 MALIGNANT NEOPLASM OF SIGMOID COLON: Primary | ICD-10-CM

## 2021-01-27 PROCEDURE — A4216 STERILE WATER/SALINE, 10 ML: HCPCS | Mod: PN | Performed by: INTERNAL MEDICINE

## 2021-01-27 PROCEDURE — 63600175 PHARM REV CODE 636 W HCPCS: Mod: PN | Performed by: INTERNAL MEDICINE

## 2021-01-27 PROCEDURE — 96523 IRRIG DRUG DELIVERY DEVICE: CPT | Mod: PN

## 2021-01-27 PROCEDURE — 25000003 PHARM REV CODE 250: Mod: PN | Performed by: INTERNAL MEDICINE

## 2021-01-27 RX ORDER — SODIUM CHLORIDE 0.9 % (FLUSH) 0.9 %
10 SYRINGE (ML) INJECTION
Status: DISCONTINUED | OUTPATIENT
Start: 2021-01-27 | End: 2021-01-27 | Stop reason: HOSPADM

## 2021-01-27 RX ORDER — HEPARIN 100 UNIT/ML
500 SYRINGE INTRAVENOUS
Status: CANCELLED | OUTPATIENT
Start: 2021-01-27

## 2021-01-27 RX ORDER — SODIUM CHLORIDE 0.9 % (FLUSH) 0.9 %
10 SYRINGE (ML) INJECTION
Status: CANCELLED | OUTPATIENT
Start: 2021-01-27

## 2021-01-27 RX ORDER — HEPARIN 100 UNIT/ML
500 SYRINGE INTRAVENOUS
Status: DISCONTINUED | OUTPATIENT
Start: 2021-01-27 | End: 2021-01-27 | Stop reason: HOSPADM

## 2021-01-27 RX ADMIN — HEPARIN 500 UNITS: 100 SYRINGE at 12:01

## 2021-01-27 RX ADMIN — Medication 10 ML: at 12:01

## 2021-02-01 ENCOUNTER — PATIENT MESSAGE (OUTPATIENT)
Dept: ENDOCRINOLOGY | Facility: CLINIC | Age: 51
End: 2021-02-01

## 2021-02-12 ENCOUNTER — OFFICE VISIT (OUTPATIENT)
Dept: HEMATOLOGY/ONCOLOGY | Facility: CLINIC | Age: 51
End: 2021-02-12
Payer: COMMERCIAL

## 2021-02-12 VITALS
HEIGHT: 66 IN | TEMPERATURE: 98 F | OXYGEN SATURATION: 98 % | SYSTOLIC BLOOD PRESSURE: 114 MMHG | RESPIRATION RATE: 18 BRPM | WEIGHT: 288.38 LBS | BODY MASS INDEX: 46.35 KG/M2 | DIASTOLIC BLOOD PRESSURE: 72 MMHG | HEART RATE: 89 BPM

## 2021-02-12 DIAGNOSIS — C18.7 MALIGNANT NEOPLASM OF SIGMOID COLON: Primary | ICD-10-CM

## 2021-02-12 DIAGNOSIS — E66.01 MORBID OBESITY: ICD-10-CM

## 2021-02-12 DIAGNOSIS — F41.9 ANXIETY: ICD-10-CM

## 2021-02-12 PROCEDURE — 99999 PR PBB SHADOW E&M-EST. PATIENT-LVL IV: CPT | Mod: PBBFAC,,, | Performed by: INTERNAL MEDICINE

## 2021-02-12 PROCEDURE — 1125F AMNT PAIN NOTED PAIN PRSNT: CPT | Mod: S$GLB,,, | Performed by: INTERNAL MEDICINE

## 2021-02-12 PROCEDURE — 3008F PR BODY MASS INDEX (BMI) DOCUMENTED: ICD-10-PCS | Mod: CPTII,S$GLB,, | Performed by: INTERNAL MEDICINE

## 2021-02-12 PROCEDURE — 3074F SYST BP LT 130 MM HG: CPT | Mod: CPTII,S$GLB,, | Performed by: INTERNAL MEDICINE

## 2021-02-12 PROCEDURE — 99214 OFFICE O/P EST MOD 30 MIN: CPT | Mod: S$GLB,,, | Performed by: INTERNAL MEDICINE

## 2021-02-12 PROCEDURE — 99214 PR OFFICE/OUTPT VISIT, EST, LEVL IV, 30-39 MIN: ICD-10-PCS | Mod: S$GLB,,, | Performed by: INTERNAL MEDICINE

## 2021-02-12 PROCEDURE — 3078F PR MOST RECENT DIASTOLIC BLOOD PRESSURE < 80 MM HG: ICD-10-PCS | Mod: CPTII,S$GLB,, | Performed by: INTERNAL MEDICINE

## 2021-02-12 PROCEDURE — 3074F PR MOST RECENT SYSTOLIC BLOOD PRESSURE < 130 MM HG: ICD-10-PCS | Mod: CPTII,S$GLB,, | Performed by: INTERNAL MEDICINE

## 2021-02-12 PROCEDURE — 3008F BODY MASS INDEX DOCD: CPT | Mod: CPTII,S$GLB,, | Performed by: INTERNAL MEDICINE

## 2021-02-12 PROCEDURE — 3078F DIAST BP <80 MM HG: CPT | Mod: CPTII,S$GLB,, | Performed by: INTERNAL MEDICINE

## 2021-02-12 PROCEDURE — 99999 PR PBB SHADOW E&M-EST. PATIENT-LVL IV: ICD-10-PCS | Mod: PBBFAC,,, | Performed by: INTERNAL MEDICINE

## 2021-02-12 PROCEDURE — 1125F PR PAIN SEVERITY QUANTIFIED, PAIN PRESENT: ICD-10-PCS | Mod: S$GLB,,, | Performed by: INTERNAL MEDICINE

## 2021-02-12 RX ORDER — ONDANSETRON HYDROCHLORIDE 8 MG/1
TABLET, FILM COATED ORAL
COMMUNITY
Start: 2020-11-25 | End: 2021-06-02 | Stop reason: ALTCHOICE

## 2021-02-12 RX ORDER — LACTULOSE 10 G/15ML
SOLUTION ORAL
COMMUNITY
Start: 2021-01-04 | End: 2022-01-12

## 2021-02-15 ENCOUNTER — PATIENT MESSAGE (OUTPATIENT)
Dept: ENDOCRINOLOGY | Facility: CLINIC | Age: 51
End: 2021-02-15

## 2021-02-17 ENCOUNTER — TELEPHONE (OUTPATIENT)
Dept: INFUSION THERAPY | Facility: HOSPITAL | Age: 51
End: 2021-02-17

## 2021-02-22 ENCOUNTER — DOCUMENTATION ONLY (OUTPATIENT)
Dept: HEMATOLOGY/ONCOLOGY | Facility: CLINIC | Age: 51
End: 2021-02-22

## 2021-02-23 ENCOUNTER — PATIENT MESSAGE (OUTPATIENT)
Dept: HEMATOLOGY/ONCOLOGY | Facility: CLINIC | Age: 51
End: 2021-02-23

## 2021-02-26 DIAGNOSIS — F41.1 ANXIETY ASSOCIATED WITH CANCER DIAGNOSIS: Primary | ICD-10-CM

## 2021-02-26 DIAGNOSIS — C80.1 ANXIETY ASSOCIATED WITH CANCER DIAGNOSIS: Primary | ICD-10-CM

## 2021-03-04 ENCOUNTER — HOSPITAL ENCOUNTER (OUTPATIENT)
Dept: RADIOLOGY | Facility: HOSPITAL | Age: 51
Discharge: HOME | End: 2021-03-04
Attending: INTERNAL MEDICINE
Payer: COMMERCIAL

## 2021-03-04 DIAGNOSIS — E03.8 OTHER SPECIFIED HYPOTHYROIDISM: ICD-10-CM

## 2021-03-04 DIAGNOSIS — E04.2 MULTINODULAR GOITER: ICD-10-CM

## 2021-03-04 PROCEDURE — 76536 US SOFT TISSUE HEAD NECK THYROID: ICD-10-PCS | Mod: 26,,, | Performed by: RADIOLOGY

## 2021-03-04 PROCEDURE — 76536 US EXAM OF HEAD AND NECK: CPT | Mod: 26,,, | Performed by: RADIOLOGY

## 2021-03-04 PROCEDURE — 76536 US EXAM OF HEAD AND NECK: CPT | Mod: TC,PO

## 2021-03-05 ENCOUNTER — PATIENT MESSAGE (OUTPATIENT)
Dept: ENDOCRINOLOGY | Facility: CLINIC | Age: 51
End: 2021-03-05

## 2021-03-09 ENCOUNTER — TELEPHONE (OUTPATIENT)
Dept: PSYCHIATRY | Facility: CLINIC | Age: 51
End: 2021-03-09

## 2021-03-10 ENCOUNTER — INFUSION (OUTPATIENT)
Dept: INFUSION THERAPY | Facility: HOSPITAL | Age: 51
End: 2021-03-10
Attending: INTERNAL MEDICINE
Payer: COMMERCIAL

## 2021-03-10 DIAGNOSIS — C18.7 MALIGNANT NEOPLASM OF SIGMOID COLON: Primary | ICD-10-CM

## 2021-03-10 PROCEDURE — 63600175 PHARM REV CODE 636 W HCPCS: Mod: PN | Performed by: INTERNAL MEDICINE

## 2021-03-10 PROCEDURE — A4216 STERILE WATER/SALINE, 10 ML: HCPCS | Mod: PN | Performed by: INTERNAL MEDICINE

## 2021-03-10 PROCEDURE — 25000003 PHARM REV CODE 250: Mod: PN | Performed by: INTERNAL MEDICINE

## 2021-03-10 PROCEDURE — 96523 IRRIG DRUG DELIVERY DEVICE: CPT | Mod: PN

## 2021-03-10 RX ORDER — SODIUM CHLORIDE 0.9 % (FLUSH) 0.9 %
10 SYRINGE (ML) INJECTION
Status: CANCELLED | OUTPATIENT
Start: 2021-03-10

## 2021-03-10 RX ORDER — HEPARIN 100 UNIT/ML
500 SYRINGE INTRAVENOUS
Status: DISCONTINUED | OUTPATIENT
Start: 2021-03-10 | End: 2021-03-10 | Stop reason: HOSPADM

## 2021-03-10 RX ORDER — SODIUM CHLORIDE 0.9 % (FLUSH) 0.9 %
10 SYRINGE (ML) INJECTION
Status: DISCONTINUED | OUTPATIENT
Start: 2021-03-10 | End: 2021-03-10 | Stop reason: HOSPADM

## 2021-03-10 RX ORDER — HEPARIN 100 UNIT/ML
500 SYRINGE INTRAVENOUS
Status: CANCELLED | OUTPATIENT
Start: 2021-03-10

## 2021-03-10 RX ADMIN — Medication 10 ML: at 12:03

## 2021-03-10 RX ADMIN — HEPARIN 500 UNITS: 100 SYRINGE at 12:03

## 2021-03-18 DIAGNOSIS — Z12.31 OTHER SCREENING MAMMOGRAM: ICD-10-CM

## 2021-03-19 ENCOUNTER — PATIENT MESSAGE (OUTPATIENT)
Dept: FAMILY MEDICINE | Facility: CLINIC | Age: 51
End: 2021-03-19

## 2021-03-19 ENCOUNTER — OFFICE VISIT (OUTPATIENT)
Dept: FAMILY MEDICINE | Facility: CLINIC | Age: 51
End: 2021-03-19
Payer: COMMERCIAL

## 2021-03-19 DIAGNOSIS — A08.4 VIRAL GASTROENTERITIS: Primary | ICD-10-CM

## 2021-03-19 PROCEDURE — 99213 OFFICE O/P EST LOW 20 MIN: CPT | Mod: 95,,, | Performed by: PHYSICIAN ASSISTANT

## 2021-03-19 PROCEDURE — 99213 PR OFFICE/OUTPT VISIT, EST, LEVL III, 20-29 MIN: ICD-10-PCS | Mod: 95,,, | Performed by: PHYSICIAN ASSISTANT

## 2021-03-23 ENCOUNTER — PATIENT OUTREACH (OUTPATIENT)
Dept: ADMINISTRATIVE | Facility: HOSPITAL | Age: 51
End: 2021-03-23

## 2021-04-21 ENCOUNTER — PATIENT MESSAGE (OUTPATIENT)
Dept: HEMATOLOGY/ONCOLOGY | Facility: CLINIC | Age: 51
End: 2021-04-21

## 2021-04-23 ENCOUNTER — INFUSION (OUTPATIENT)
Dept: INFUSION THERAPY | Facility: HOSPITAL | Age: 51
End: 2021-04-23
Attending: INTERNAL MEDICINE
Payer: COMMERCIAL

## 2021-04-23 ENCOUNTER — PATIENT MESSAGE (OUTPATIENT)
Dept: GASTROENTEROLOGY | Facility: CLINIC | Age: 51
End: 2021-04-23

## 2021-04-23 ENCOUNTER — PATIENT MESSAGE (OUTPATIENT)
Dept: HEMATOLOGY/ONCOLOGY | Facility: CLINIC | Age: 51
End: 2021-04-23

## 2021-04-23 ENCOUNTER — OFFICE VISIT (OUTPATIENT)
Dept: HEMATOLOGY/ONCOLOGY | Facility: CLINIC | Age: 51
End: 2021-04-23
Payer: COMMERCIAL

## 2021-04-23 VITALS
HEIGHT: 66 IN | HEART RATE: 82 BPM | TEMPERATURE: 97 F | BODY MASS INDEX: 46.21 KG/M2 | SYSTOLIC BLOOD PRESSURE: 142 MMHG | DIASTOLIC BLOOD PRESSURE: 78 MMHG | WEIGHT: 287.5 LBS | OXYGEN SATURATION: 99 % | RESPIRATION RATE: 18 BRPM

## 2021-04-23 DIAGNOSIS — C18.7 MALIGNANT NEOPLASM OF SIGMOID COLON: Primary | ICD-10-CM

## 2021-04-23 DIAGNOSIS — Z12.89 ENCOUNTER FOR SCREENING FOR MALIGNANT NEOPLASM OF OTHER SITES: ICD-10-CM

## 2021-04-23 DIAGNOSIS — F41.1 ANXIETY ASSOCIATED WITH CANCER DIAGNOSIS: ICD-10-CM

## 2021-04-23 DIAGNOSIS — E66.01 MORBID OBESITY: ICD-10-CM

## 2021-04-23 DIAGNOSIS — C80.1 ANXIETY ASSOCIATED WITH CANCER DIAGNOSIS: ICD-10-CM

## 2021-04-23 PROCEDURE — 25000003 PHARM REV CODE 250: Mod: PN | Performed by: INTERNAL MEDICINE

## 2021-04-23 PROCEDURE — 1126F AMNT PAIN NOTED NONE PRSNT: CPT | Mod: S$GLB,,, | Performed by: INTERNAL MEDICINE

## 2021-04-23 PROCEDURE — 99999 PR PBB SHADOW E&M-EST. PATIENT-LVL V: ICD-10-PCS | Mod: PBBFAC,,, | Performed by: INTERNAL MEDICINE

## 2021-04-23 PROCEDURE — 3008F BODY MASS INDEX DOCD: CPT | Mod: CPTII,S$GLB,, | Performed by: INTERNAL MEDICINE

## 2021-04-23 PROCEDURE — 96523 IRRIG DRUG DELIVERY DEVICE: CPT | Mod: PN

## 2021-04-23 PROCEDURE — 1126F PR PAIN SEVERITY QUANTIFIED, NO PAIN PRESENT: ICD-10-PCS | Mod: S$GLB,,, | Performed by: INTERNAL MEDICINE

## 2021-04-23 PROCEDURE — 99214 PR OFFICE/OUTPT VISIT, EST, LEVL IV, 30-39 MIN: ICD-10-PCS | Mod: S$GLB,,, | Performed by: INTERNAL MEDICINE

## 2021-04-23 PROCEDURE — 3008F PR BODY MASS INDEX (BMI) DOCUMENTED: ICD-10-PCS | Mod: CPTII,S$GLB,, | Performed by: INTERNAL MEDICINE

## 2021-04-23 PROCEDURE — A4216 STERILE WATER/SALINE, 10 ML: HCPCS | Mod: PN | Performed by: INTERNAL MEDICINE

## 2021-04-23 PROCEDURE — 99999 PR PBB SHADOW E&M-EST. PATIENT-LVL V: CPT | Mod: PBBFAC,,, | Performed by: INTERNAL MEDICINE

## 2021-04-23 PROCEDURE — 99214 OFFICE O/P EST MOD 30 MIN: CPT | Mod: S$GLB,,, | Performed by: INTERNAL MEDICINE

## 2021-04-23 RX ORDER — HEPARIN 100 UNIT/ML
500 SYRINGE INTRAVENOUS
Status: DISCONTINUED | OUTPATIENT
Start: 2021-04-23 | End: 2021-04-26 | Stop reason: HOSPADM

## 2021-04-23 RX ORDER — HEPARIN 100 UNIT/ML
500 SYRINGE INTRAVENOUS
Status: CANCELLED | OUTPATIENT
Start: 2021-04-23

## 2021-04-23 RX ORDER — SODIUM CHLORIDE 0.9 % (FLUSH) 0.9 %
10 SYRINGE (ML) INJECTION
Status: DISCONTINUED | OUTPATIENT
Start: 2021-04-23 | End: 2021-04-26 | Stop reason: HOSPADM

## 2021-04-23 RX ORDER — SODIUM CHLORIDE 0.9 % (FLUSH) 0.9 %
10 SYRINGE (ML) INJECTION
Status: CANCELLED | OUTPATIENT
Start: 2021-04-23

## 2021-04-23 RX ADMIN — Medication 10 ML: at 03:04

## 2021-04-27 ENCOUNTER — TELEPHONE (OUTPATIENT)
Dept: PSYCHIATRY | Facility: CLINIC | Age: 51
End: 2021-04-27

## 2021-04-27 ENCOUNTER — TELEPHONE (OUTPATIENT)
Dept: GASTROENTEROLOGY | Facility: CLINIC | Age: 51
End: 2021-04-27

## 2021-04-29 ENCOUNTER — OFFICE VISIT (OUTPATIENT)
Dept: HEMATOLOGY/ONCOLOGY | Facility: CLINIC | Age: 51
End: 2021-04-29
Payer: COMMERCIAL

## 2021-04-29 VITALS
OXYGEN SATURATION: 97 % | HEART RATE: 82 BPM | TEMPERATURE: 98 F | WEIGHT: 287.25 LBS | DIASTOLIC BLOOD PRESSURE: 70 MMHG | SYSTOLIC BLOOD PRESSURE: 111 MMHG | BODY MASS INDEX: 46.36 KG/M2

## 2021-04-29 DIAGNOSIS — C18.7 MALIGNANT NEOPLASM OF SIGMOID COLON: Primary | ICD-10-CM

## 2021-04-29 PROCEDURE — 1126F PR PAIN SEVERITY QUANTIFIED, NO PAIN PRESENT: ICD-10-PCS | Mod: S$GLB,,, | Performed by: NURSE PRACTITIONER

## 2021-04-29 PROCEDURE — 3008F PR BODY MASS INDEX (BMI) DOCUMENTED: ICD-10-PCS | Mod: CPTII,S$GLB,, | Performed by: NURSE PRACTITIONER

## 2021-04-29 PROCEDURE — 99999 PR PBB SHADOW E&M-EST. PATIENT-LVL IV: ICD-10-PCS | Mod: PBBFAC,,, | Performed by: NURSE PRACTITIONER

## 2021-04-29 PROCEDURE — 99214 OFFICE O/P EST MOD 30 MIN: CPT | Mod: S$GLB,,, | Performed by: NURSE PRACTITIONER

## 2021-04-29 PROCEDURE — 99999 PR PBB SHADOW E&M-EST. PATIENT-LVL IV: CPT | Mod: PBBFAC,,, | Performed by: NURSE PRACTITIONER

## 2021-04-29 PROCEDURE — 1126F AMNT PAIN NOTED NONE PRSNT: CPT | Mod: S$GLB,,, | Performed by: NURSE PRACTITIONER

## 2021-04-29 PROCEDURE — 99214 PR OFFICE/OUTPT VISIT, EST, LEVL IV, 30-39 MIN: ICD-10-PCS | Mod: S$GLB,,, | Performed by: NURSE PRACTITIONER

## 2021-04-29 PROCEDURE — 3008F BODY MASS INDEX DOCD: CPT | Mod: CPTII,S$GLB,, | Performed by: NURSE PRACTITIONER

## 2021-05-11 ENCOUNTER — PATIENT MESSAGE (OUTPATIENT)
Dept: PSYCHIATRY | Facility: CLINIC | Age: 51
End: 2021-05-11

## 2021-05-18 ENCOUNTER — HOSPITAL ENCOUNTER (OUTPATIENT)
Dept: RADIOLOGY | Facility: HOSPITAL | Age: 51
Discharge: HOME | End: 2021-05-18
Attending: INTERNAL MEDICINE
Payer: COMMERCIAL

## 2021-05-18 DIAGNOSIS — Z12.89 ENCOUNTER FOR SCREENING FOR MALIGNANT NEOPLASM OF OTHER SITES: ICD-10-CM

## 2021-05-18 PROCEDURE — 71260 CT THORAX DX C+: CPT | Mod: 26,,, | Performed by: RADIOLOGY

## 2021-05-18 PROCEDURE — 71260 CT CHEST ABDOMEN PELVIS WITH CONTRAST (XPD): ICD-10-PCS | Mod: 26,,, | Performed by: RADIOLOGY

## 2021-05-18 PROCEDURE — 25500020 PHARM REV CODE 255: Mod: PO | Performed by: INTERNAL MEDICINE

## 2021-05-18 PROCEDURE — 74177 CT CHEST ABDOMEN PELVIS WITH CONTRAST (XPD): ICD-10-PCS | Mod: 26,,, | Performed by: RADIOLOGY

## 2021-05-18 PROCEDURE — 74177 CT ABD & PELVIS W/CONTRAST: CPT | Mod: TC,PO

## 2021-05-18 PROCEDURE — A9698 NON-RAD CONTRAST MATERIALNOC: HCPCS | Mod: PO | Performed by: INTERNAL MEDICINE

## 2021-05-18 PROCEDURE — 74177 CT ABD & PELVIS W/CONTRAST: CPT | Mod: 26,,, | Performed by: RADIOLOGY

## 2021-05-18 RX ADMIN — IOHEXOL 1000 ML: 12 SOLUTION ORAL at 10:05

## 2021-05-18 RX ADMIN — IOHEXOL 100 ML: 350 INJECTION, SOLUTION INTRAVENOUS at 10:05

## 2021-05-21 ENCOUNTER — OFFICE VISIT (OUTPATIENT)
Dept: HEMATOLOGY/ONCOLOGY | Facility: CLINIC | Age: 51
End: 2021-05-21
Payer: COMMERCIAL

## 2021-05-21 VITALS
BODY MASS INDEX: 46.51 KG/M2 | HEART RATE: 69 BPM | HEIGHT: 66 IN | SYSTOLIC BLOOD PRESSURE: 126 MMHG | TEMPERATURE: 98 F | RESPIRATION RATE: 18 BRPM | WEIGHT: 289.38 LBS | OXYGEN SATURATION: 100 % | DIASTOLIC BLOOD PRESSURE: 84 MMHG

## 2021-05-21 DIAGNOSIS — F41.9 ANXIETY: ICD-10-CM

## 2021-05-21 DIAGNOSIS — D49.89 NEOPLASM OF ABDOMEN: ICD-10-CM

## 2021-05-21 DIAGNOSIS — C77.9 SECONDARY ADENOCARCINOMA OF LYMPH NODE: ICD-10-CM

## 2021-05-21 DIAGNOSIS — C26.9 MALIGNANT NEOPLASM OF ILL-DEFINED SITES WITHIN THE DIGESTIVE SYSTEM: ICD-10-CM

## 2021-05-21 DIAGNOSIS — E03.8 OTHER SPECIFIED HYPOTHYROIDISM: Chronic | ICD-10-CM

## 2021-05-21 DIAGNOSIS — K76.0 FATTY LIVER: ICD-10-CM

## 2021-05-21 DIAGNOSIS — C18.7 MALIGNANT NEOPLASM OF SIGMOID COLON: Primary | ICD-10-CM

## 2021-05-21 PROCEDURE — 3008F BODY MASS INDEX DOCD: CPT | Mod: CPTII,S$GLB,, | Performed by: INTERNAL MEDICINE

## 2021-05-21 PROCEDURE — 99215 OFFICE O/P EST HI 40 MIN: CPT | Mod: S$GLB,,, | Performed by: INTERNAL MEDICINE

## 2021-05-21 PROCEDURE — 99999 PR PBB SHADOW E&M-EST. PATIENT-LVL III: ICD-10-PCS | Mod: PBBFAC,,, | Performed by: INTERNAL MEDICINE

## 2021-05-21 PROCEDURE — 3008F PR BODY MASS INDEX (BMI) DOCUMENTED: ICD-10-PCS | Mod: CPTII,S$GLB,, | Performed by: INTERNAL MEDICINE

## 2021-05-21 PROCEDURE — 1126F AMNT PAIN NOTED NONE PRSNT: CPT | Mod: S$GLB,,, | Performed by: INTERNAL MEDICINE

## 2021-05-21 PROCEDURE — 99215 PR OFFICE/OUTPT VISIT, EST, LEVL V, 40-54 MIN: ICD-10-PCS | Mod: S$GLB,,, | Performed by: INTERNAL MEDICINE

## 2021-05-21 PROCEDURE — 99999 PR PBB SHADOW E&M-EST. PATIENT-LVL III: CPT | Mod: PBBFAC,,, | Performed by: INTERNAL MEDICINE

## 2021-05-21 PROCEDURE — 1126F PR PAIN SEVERITY QUANTIFIED, NO PAIN PRESENT: ICD-10-PCS | Mod: S$GLB,,, | Performed by: INTERNAL MEDICINE

## 2021-05-24 ENCOUNTER — TELEPHONE (OUTPATIENT)
Dept: HEMATOLOGY/ONCOLOGY | Facility: CLINIC | Age: 51
End: 2021-05-24

## 2021-05-25 ENCOUNTER — HOSPITAL ENCOUNTER (OUTPATIENT)
Dept: RADIOLOGY | Facility: HOSPITAL | Age: 51
Discharge: HOME | End: 2021-05-25
Attending: INTERNAL MEDICINE
Payer: COMMERCIAL

## 2021-05-25 ENCOUNTER — PATIENT MESSAGE (OUTPATIENT)
Dept: PSYCHIATRY | Facility: CLINIC | Age: 51
End: 2021-05-25

## 2021-05-25 ENCOUNTER — PATIENT MESSAGE (OUTPATIENT)
Dept: HEMATOLOGY/ONCOLOGY | Facility: CLINIC | Age: 51
End: 2021-05-25

## 2021-05-25 DIAGNOSIS — C26.9 MALIGNANT NEOPLASM OF ILL-DEFINED SITES WITHIN THE DIGESTIVE SYSTEM: ICD-10-CM

## 2021-05-25 DIAGNOSIS — D49.89 NEOPLASM OF ABDOMEN: ICD-10-CM

## 2021-05-25 PROCEDURE — A9585 GADOBUTROL INJECTION: HCPCS | Performed by: INTERNAL MEDICINE

## 2021-05-25 PROCEDURE — 74183 MRI ABDOMEN W WO CONTRAST: ICD-10-PCS | Mod: 26,,, | Performed by: RADIOLOGY

## 2021-05-25 PROCEDURE — 78815 NM PET CT ROUTINE: ICD-10-PCS | Mod: 26,PS,, | Performed by: RADIOLOGY

## 2021-05-25 PROCEDURE — 78815 PET IMAGE W/CT SKULL-THIGH: CPT | Mod: 26,PS,, | Performed by: RADIOLOGY

## 2021-05-25 PROCEDURE — 78815 PET IMAGE W/CT SKULL-THIGH: CPT | Mod: TC,PO

## 2021-05-25 PROCEDURE — 25500020 PHARM REV CODE 255: Performed by: INTERNAL MEDICINE

## 2021-05-25 PROCEDURE — 74183 MRI ABD W/O CNTR FLWD CNTR: CPT | Mod: TC

## 2021-05-25 PROCEDURE — A9552 F18 FDG: HCPCS | Mod: PO

## 2021-05-25 PROCEDURE — 74183 MRI ABD W/O CNTR FLWD CNTR: CPT | Mod: 26,,, | Performed by: RADIOLOGY

## 2021-05-25 RX ORDER — GADOBUTROL 604.72 MG/ML
10 INJECTION INTRAVENOUS
Status: COMPLETED | OUTPATIENT
Start: 2021-05-25 | End: 2021-05-25

## 2021-05-25 RX ADMIN — GADOBUTROL 10 ML: 604.72 INJECTION INTRAVENOUS at 07:05

## 2021-05-26 ENCOUNTER — PATIENT MESSAGE (OUTPATIENT)
Dept: HEMATOLOGY/ONCOLOGY | Facility: CLINIC | Age: 51
End: 2021-05-26

## 2021-05-26 DIAGNOSIS — C18.7 MALIGNANT NEOPLASM OF SIGMOID COLON: Primary | ICD-10-CM

## 2021-05-28 ENCOUNTER — LAB VISIT (OUTPATIENT)
Dept: LAB | Facility: HOSPITAL | Age: 51
End: 2021-05-28
Attending: PATHOLOGY
Payer: COMMERCIAL

## 2021-05-28 ENCOUNTER — OFFICE VISIT (OUTPATIENT)
Dept: HEMATOLOGY/ONCOLOGY | Facility: CLINIC | Age: 51
End: 2021-05-28
Payer: COMMERCIAL

## 2021-05-28 VITALS
BODY MASS INDEX: 46.86 KG/M2 | WEIGHT: 290.38 LBS | OXYGEN SATURATION: 98 % | SYSTOLIC BLOOD PRESSURE: 120 MMHG | HEART RATE: 63 BPM | DIASTOLIC BLOOD PRESSURE: 80 MMHG | TEMPERATURE: 98 F

## 2021-05-28 DIAGNOSIS — K76.0 FATTY LIVER: ICD-10-CM

## 2021-05-28 DIAGNOSIS — E04.1 THYROID NODULE: ICD-10-CM

## 2021-05-28 DIAGNOSIS — C77.9 SECONDARY ADENOCARCINOMA OF LYMPH NODE: ICD-10-CM

## 2021-05-28 DIAGNOSIS — E03.8 OTHER SPECIFIED HYPOTHYROIDISM: Chronic | ICD-10-CM

## 2021-05-28 DIAGNOSIS — C18.7 MALIGNANT NEOPLASM OF SIGMOID COLON: ICD-10-CM

## 2021-05-28 DIAGNOSIS — C18.7 MALIGNANT NEOPLASM OF SIGMOID COLON: Primary | ICD-10-CM

## 2021-05-28 PROCEDURE — 99999 PR PBB SHADOW E&M-EST. PATIENT-LVL III: CPT | Mod: PBBFAC,,, | Performed by: INTERNAL MEDICINE

## 2021-05-28 PROCEDURE — 88381 MICRODISSECTION MANUAL: CPT | Performed by: PATHOLOGY

## 2021-05-28 PROCEDURE — 1126F PR PAIN SEVERITY QUANTIFIED, NO PAIN PRESENT: ICD-10-PCS | Mod: S$GLB,,, | Performed by: INTERNAL MEDICINE

## 2021-05-28 PROCEDURE — 88363 XM ARCHIVE TISSUE MOLEC ANAL: CPT | Mod: 59 | Performed by: PATHOLOGY

## 2021-05-28 PROCEDURE — 81403 MOPATH PROCEDURE LEVEL 4: CPT | Performed by: PATHOLOGY

## 2021-05-28 PROCEDURE — 81210 BRAF GENE: CPT | Performed by: PATHOLOGY

## 2021-05-28 PROCEDURE — 81311 NRAS GENE VARIANTS EXON 2&3: CPT | Performed by: PATHOLOGY

## 2021-05-28 PROCEDURE — 3008F BODY MASS INDEX DOCD: CPT | Mod: CPTII,S$GLB,, | Performed by: INTERNAL MEDICINE

## 2021-05-28 PROCEDURE — 1126F AMNT PAIN NOTED NONE PRSNT: CPT | Mod: S$GLB,,, | Performed by: INTERNAL MEDICINE

## 2021-05-28 PROCEDURE — 88363 XM ARCHIVE TISSUE MOLEC ANAL: CPT | Mod: ,,, | Performed by: PATHOLOGY

## 2021-05-28 PROCEDURE — 99215 OFFICE O/P EST HI 40 MIN: CPT | Mod: S$GLB,,, | Performed by: INTERNAL MEDICINE

## 2021-05-28 PROCEDURE — 3008F PR BODY MASS INDEX (BMI) DOCUMENTED: ICD-10-PCS | Mod: CPTII,S$GLB,, | Performed by: INTERNAL MEDICINE

## 2021-05-28 PROCEDURE — 88363 PR  EXAM & SELECT ARCHIVE TISSUE MOLECULAR ANALYSIS: ICD-10-PCS | Mod: ,,, | Performed by: PATHOLOGY

## 2021-05-28 PROCEDURE — 81275 KRAS GENE VARIANTS EXON 2: CPT | Performed by: PATHOLOGY

## 2021-05-28 PROCEDURE — 99215 PR OFFICE/OUTPT VISIT, EST, LEVL V, 40-54 MIN: ICD-10-PCS | Mod: S$GLB,,, | Performed by: INTERNAL MEDICINE

## 2021-05-28 PROCEDURE — 99999 PR PBB SHADOW E&M-EST. PATIENT-LVL III: ICD-10-PCS | Mod: PBBFAC,,, | Performed by: INTERNAL MEDICINE

## 2021-05-29 PROBLEM — E04.1 THYROID NODULE: Status: ACTIVE | Noted: 2021-05-29

## 2021-05-31 ENCOUNTER — TELEPHONE (OUTPATIENT)
Dept: HEMATOLOGY/ONCOLOGY | Facility: CLINIC | Age: 51
End: 2021-05-31

## 2021-06-01 ENCOUNTER — PATIENT MESSAGE (OUTPATIENT)
Dept: HEMATOLOGY/ONCOLOGY | Facility: CLINIC | Age: 51
End: 2021-06-01

## 2021-06-02 ENCOUNTER — INFUSION (OUTPATIENT)
Dept: INFUSION THERAPY | Facility: HOSPITAL | Age: 51
End: 2021-06-02
Attending: INTERNAL MEDICINE
Payer: COMMERCIAL

## 2021-06-02 ENCOUNTER — DOCUMENTATION ONLY (OUTPATIENT)
Dept: INFUSION THERAPY | Facility: HOSPITAL | Age: 51
End: 2021-06-02

## 2021-06-02 ENCOUNTER — CLINICAL SUPPORT (OUTPATIENT)
Dept: HEMATOLOGY/ONCOLOGY | Facility: CLINIC | Age: 51
End: 2021-06-02
Payer: COMMERCIAL

## 2021-06-02 VITALS
HEIGHT: 66 IN | WEIGHT: 289.88 LBS | HEART RATE: 70 BPM | RESPIRATION RATE: 14 BRPM | OXYGEN SATURATION: 99 % | DIASTOLIC BLOOD PRESSURE: 73 MMHG | TEMPERATURE: 97 F | SYSTOLIC BLOOD PRESSURE: 140 MMHG | BODY MASS INDEX: 46.59 KG/M2

## 2021-06-02 DIAGNOSIS — C18.7 MALIGNANT NEOPLASM OF SIGMOID COLON: Primary | ICD-10-CM

## 2021-06-02 DIAGNOSIS — C77.2 METASTASIS TO INTESTINAL LYMPH NODE: ICD-10-CM

## 2021-06-02 PROCEDURE — 63600175 PHARM REV CODE 636 W HCPCS: Mod: PN | Performed by: INTERNAL MEDICINE

## 2021-06-02 PROCEDURE — 99999 PR PBB SHADOW E&M-EST. PATIENT-LVL III: CPT | Mod: PBBFAC,,, | Performed by: NURSE PRACTITIONER

## 2021-06-02 PROCEDURE — A4216 STERILE WATER/SALINE, 10 ML: HCPCS | Mod: PN | Performed by: INTERNAL MEDICINE

## 2021-06-02 PROCEDURE — 25000003 PHARM REV CODE 250: Mod: PN | Performed by: INTERNAL MEDICINE

## 2021-06-02 PROCEDURE — 99214 PR OFFICE/OUTPT VISIT, EST, LEVL IV, 30-39 MIN: ICD-10-PCS | Mod: S$GLB,,, | Performed by: NURSE PRACTITIONER

## 2021-06-02 PROCEDURE — 99214 OFFICE O/P EST MOD 30 MIN: CPT | Mod: S$GLB,,, | Performed by: NURSE PRACTITIONER

## 2021-06-02 PROCEDURE — 99999 PR PBB SHADOW E&M-EST. PATIENT-LVL III: ICD-10-PCS | Mod: PBBFAC,,, | Performed by: NURSE PRACTITIONER

## 2021-06-02 PROCEDURE — 96523 IRRIG DRUG DELIVERY DEVICE: CPT | Mod: PN

## 2021-06-02 RX ORDER — PROMETHAZINE HYDROCHLORIDE 12.5 MG/1
TABLET ORAL
Qty: 40 TABLET | Refills: 3 | Status: SHIPPED | OUTPATIENT
Start: 2021-06-02 | End: 2022-10-19 | Stop reason: SDUPTHER

## 2021-06-02 RX ORDER — HEPARIN 100 UNIT/ML
500 SYRINGE INTRAVENOUS
Status: CANCELLED | OUTPATIENT
Start: 2021-06-02

## 2021-06-02 RX ORDER — SODIUM CHLORIDE 0.9 % (FLUSH) 0.9 %
10 SYRINGE (ML) INJECTION
Status: CANCELLED | OUTPATIENT
Start: 2021-06-02

## 2021-06-02 RX ORDER — HEPARIN 100 UNIT/ML
500 SYRINGE INTRAVENOUS
Status: DISCONTINUED | OUTPATIENT
Start: 2021-06-02 | End: 2021-06-02 | Stop reason: HOSPADM

## 2021-06-02 RX ORDER — LIDOCAINE AND PRILOCAINE 25; 25 MG/G; MG/G
CREAM TOPICAL
Qty: 30 G | Refills: 3 | Status: SHIPPED | OUTPATIENT
Start: 2021-06-02 | End: 2022-04-07 | Stop reason: SDUPTHER

## 2021-06-02 RX ORDER — SODIUM CHLORIDE 0.9 % (FLUSH) 0.9 %
10 SYRINGE (ML) INJECTION
Status: DISCONTINUED | OUTPATIENT
Start: 2021-06-02 | End: 2021-06-02 | Stop reason: HOSPADM

## 2021-06-02 RX ORDER — ONDANSETRON 8 MG/1
8 TABLET, ORALLY DISINTEGRATING ORAL EVERY 8 HOURS PRN
Qty: 40 TABLET | Refills: 3 | Status: SHIPPED | OUTPATIENT
Start: 2021-06-02 | End: 2021-10-04

## 2021-06-02 RX ADMIN — Medication 10 ML: at 10:06

## 2021-06-02 RX ADMIN — HEPARIN 500 UNITS: 100 SYRINGE at 10:06

## 2021-06-07 ENCOUNTER — PATIENT MESSAGE (OUTPATIENT)
Dept: HEMATOLOGY/ONCOLOGY | Facility: CLINIC | Age: 51
End: 2021-06-07

## 2021-06-07 ENCOUNTER — TELEPHONE (OUTPATIENT)
Dept: HEMATOLOGY/ONCOLOGY | Facility: CLINIC | Age: 51
End: 2021-06-07

## 2021-06-14 ENCOUNTER — LAB VISIT (OUTPATIENT)
Dept: LAB | Facility: HOSPITAL | Age: 51
End: 2021-06-14
Attending: INTERNAL MEDICINE
Payer: COMMERCIAL

## 2021-06-14 ENCOUNTER — OFFICE VISIT (OUTPATIENT)
Dept: HEMATOLOGY/ONCOLOGY | Facility: CLINIC | Age: 51
End: 2021-06-14
Payer: COMMERCIAL

## 2021-06-14 VITALS
OXYGEN SATURATION: 97 % | HEART RATE: 88 BPM | SYSTOLIC BLOOD PRESSURE: 122 MMHG | WEIGHT: 291 LBS | RESPIRATION RATE: 18 BRPM | DIASTOLIC BLOOD PRESSURE: 77 MMHG | HEIGHT: 66 IN | BODY MASS INDEX: 46.77 KG/M2 | TEMPERATURE: 98 F

## 2021-06-14 DIAGNOSIS — C77.9 SECONDARY ADENOCARCINOMA OF LYMPH NODE: ICD-10-CM

## 2021-06-14 DIAGNOSIS — C18.7 MALIGNANT NEOPLASM OF SIGMOID COLON: Primary | ICD-10-CM

## 2021-06-14 DIAGNOSIS — F41.9 ANXIETY: ICD-10-CM

## 2021-06-14 DIAGNOSIS — C77.2 METASTASIS TO INTESTINAL LYMPH NODE: ICD-10-CM

## 2021-06-14 DIAGNOSIS — C18.7 MALIGNANT NEOPLASM OF SIGMOID COLON: ICD-10-CM

## 2021-06-14 LAB
ALBUMIN SERPL BCP-MCNC: 3.9 G/DL (ref 3.5–5.2)
ALP SERPL-CCNC: 181 U/L (ref 55–135)
ALT SERPL W/O P-5'-P-CCNC: 35 U/L (ref 10–44)
ANION GAP SERPL CALC-SCNC: 9 MMOL/L (ref 8–16)
AST SERPL-CCNC: 20 U/L (ref 10–40)
BASOPHILS # BLD AUTO: 0.03 K/UL (ref 0–0.2)
BASOPHILS NFR BLD: 0.4 % (ref 0–1.9)
BILIRUB SERPL-MCNC: 0.4 MG/DL (ref 0.1–1)
BILIRUB UR QL STRIP: NEGATIVE
BUN SERPL-MCNC: 15 MG/DL (ref 6–20)
CALCIUM SERPL-MCNC: 10.8 MG/DL (ref 8.7–10.5)
CHLORIDE SERPL-SCNC: 108 MMOL/L (ref 95–110)
CLARITY UR: CLEAR
CO2 SERPL-SCNC: 23 MMOL/L (ref 23–29)
COLOR UR: YELLOW
CREAT SERPL-MCNC: 0.8 MG/DL (ref 0.5–1.4)
DIFFERENTIAL METHOD: ABNORMAL
EOSINOPHIL # BLD AUTO: 0.2 K/UL (ref 0–0.5)
EOSINOPHIL NFR BLD: 3.2 % (ref 0–8)
ERYTHROCYTE [DISTWIDTH] IN BLOOD BY AUTOMATED COUNT: 16 % (ref 11.5–14.5)
EST. GFR  (AFRICAN AMERICAN): >60 ML/MIN/1.73 M^2
EST. GFR  (NON AFRICAN AMERICAN): >60 ML/MIN/1.73 M^2
FINAL PATHOLOGIC DIAGNOSIS: NORMAL
GLUCOSE SERPL-MCNC: 96 MG/DL (ref 70–110)
GLUCOSE UR QL STRIP: NEGATIVE
GROSS: NORMAL
HCT VFR BLD AUTO: 44.5 % (ref 37–48.5)
HGB BLD-MCNC: 13.7 G/DL (ref 12–16)
HGB UR QL STRIP: NEGATIVE
IMM GRANULOCYTES # BLD AUTO: 0.02 K/UL (ref 0–0.04)
IMM GRANULOCYTES NFR BLD AUTO: 0.3 % (ref 0–0.5)
KETONES UR QL STRIP: NEGATIVE
LEUKOCYTE ESTERASE UR QL STRIP: NEGATIVE
LYMPHOCYTES # BLD AUTO: 1.9 K/UL (ref 1–4.8)
LYMPHOCYTES NFR BLD: 26.5 % (ref 18–48)
MCH RBC QN AUTO: 26.9 PG (ref 27–31)
MCHC RBC AUTO-ENTMCNC: 30.8 G/DL (ref 32–36)
MCV RBC AUTO: 87 FL (ref 82–98)
MONOCYTES # BLD AUTO: 0.6 K/UL (ref 0.3–1)
MONOCYTES NFR BLD: 8.9 % (ref 4–15)
NEUTROPHILS # BLD AUTO: 4.2 K/UL (ref 1.8–7.7)
NEUTROPHILS NFR BLD: 60.7 % (ref 38–73)
NITRITE UR QL STRIP: NEGATIVE
NRBC BLD-RTO: 0 /100 WBC
PH UR STRIP: 6 [PH] (ref 5–8)
PLATELET # BLD AUTO: 322 K/UL (ref 150–450)
PMV BLD AUTO: 10.4 FL (ref 9.2–12.9)
POTASSIUM SERPL-SCNC: 4.2 MMOL/L (ref 3.5–5.1)
PROT SERPL-MCNC: 7.2 G/DL (ref 6–8.4)
PROT UR QL STRIP: NEGATIVE
RBC # BLD AUTO: 5.1 M/UL (ref 4–5.4)
SODIUM SERPL-SCNC: 140 MMOL/L (ref 136–145)
SP GR UR STRIP: 1.01 (ref 1–1.03)
SUPPLEMENTAL DIAGNOSIS: NORMAL
URN SPEC COLLECT METH UR: NORMAL
WBC # BLD AUTO: 6.98 K/UL (ref 3.9–12.7)

## 2021-06-14 PROCEDURE — 80053 COMPREHEN METABOLIC PANEL: CPT | Mod: PN | Performed by: INTERNAL MEDICINE

## 2021-06-14 PROCEDURE — 85025 COMPLETE CBC W/AUTO DIFF WBC: CPT | Mod: PN | Performed by: INTERNAL MEDICINE

## 2021-06-14 PROCEDURE — 99214 OFFICE O/P EST MOD 30 MIN: CPT | Mod: PN | Performed by: INTERNAL MEDICINE

## 2021-06-14 PROCEDURE — 99215 OFFICE O/P EST HI 40 MIN: CPT | Mod: ,,, | Performed by: INTERNAL MEDICINE

## 2021-06-14 PROCEDURE — 99215 PR OFFICE/OUTPT VISIT, EST, LEVL V, 40-54 MIN: ICD-10-PCS | Mod: ,,, | Performed by: INTERNAL MEDICINE

## 2021-06-14 PROCEDURE — 1126F PR PAIN SEVERITY QUANTIFIED, NO PAIN PRESENT: ICD-10-PCS | Mod: ,,, | Performed by: INTERNAL MEDICINE

## 2021-06-14 PROCEDURE — 36415 COLL VENOUS BLD VENIPUNCTURE: CPT | Mod: PN | Performed by: INTERNAL MEDICINE

## 2021-06-14 PROCEDURE — 1126F AMNT PAIN NOTED NONE PRSNT: CPT | Mod: ,,, | Performed by: INTERNAL MEDICINE

## 2021-06-14 PROCEDURE — 3008F BODY MASS INDEX DOCD: CPT | Mod: CPTII,,, | Performed by: INTERNAL MEDICINE

## 2021-06-14 PROCEDURE — 81003 URINALYSIS AUTO W/O SCOPE: CPT | Mod: PN | Performed by: INTERNAL MEDICINE

## 2021-06-14 PROCEDURE — 3008F PR BODY MASS INDEX (BMI) DOCUMENTED: ICD-10-PCS | Mod: CPTII,,, | Performed by: INTERNAL MEDICINE

## 2021-06-15 ENCOUNTER — DOCUMENTATION ONLY (OUTPATIENT)
Dept: PHARMACY | Facility: AMBULARY SURGERY CENTER | Age: 51
End: 2021-06-15

## 2021-06-16 ENCOUNTER — INFUSION (OUTPATIENT)
Dept: INFUSION THERAPY | Facility: HOSPITAL | Age: 51
End: 2021-06-16
Attending: INTERNAL MEDICINE
Payer: COMMERCIAL

## 2021-06-16 VITALS
RESPIRATION RATE: 16 BRPM | WEIGHT: 291 LBS | HEART RATE: 82 BPM | SYSTOLIC BLOOD PRESSURE: 124 MMHG | DIASTOLIC BLOOD PRESSURE: 57 MMHG | HEIGHT: 66 IN | TEMPERATURE: 99 F | BODY MASS INDEX: 46.77 KG/M2

## 2021-06-16 DIAGNOSIS — C18.7 MALIGNANT NEOPLASM OF SIGMOID COLON: ICD-10-CM

## 2021-06-16 DIAGNOSIS — C77.2 METASTASIS TO INTESTINAL LYMPH NODE: Primary | ICD-10-CM

## 2021-06-16 PROCEDURE — 96375 TX/PRO/DX INJ NEW DRUG ADDON: CPT | Mod: PN

## 2021-06-16 PROCEDURE — 96415 CHEMO IV INFUSION ADDL HR: CPT | Mod: PN

## 2021-06-16 PROCEDURE — A4216 STERILE WATER/SALINE, 10 ML: HCPCS | Mod: PN | Performed by: INTERNAL MEDICINE

## 2021-06-16 PROCEDURE — 96417 CHEMO IV INFUS EACH ADDL SEQ: CPT | Mod: PN

## 2021-06-16 PROCEDURE — 96368 THER/DIAG CONCURRENT INF: CPT | Mod: PN

## 2021-06-16 PROCEDURE — 96413 CHEMO IV INFUSION 1 HR: CPT | Mod: PN

## 2021-06-16 PROCEDURE — 96416 CHEMO PROLONG INFUSE W/PUMP: CPT | Mod: PN

## 2021-06-16 PROCEDURE — 63600175 PHARM REV CODE 636 W HCPCS: Mod: PN | Performed by: INTERNAL MEDICINE

## 2021-06-16 PROCEDURE — 25000003 PHARM REV CODE 250: Mod: PN | Performed by: INTERNAL MEDICINE

## 2021-06-16 PROCEDURE — 96367 TX/PROPH/DG ADDL SEQ IV INF: CPT | Mod: PN

## 2021-06-16 PROCEDURE — 96411 CHEMO IV PUSH ADDL DRUG: CPT | Mod: PN

## 2021-06-16 RX ORDER — HEPARIN 100 UNIT/ML
500 SYRINGE INTRAVENOUS
Status: CANCELLED | OUTPATIENT
Start: 2021-06-18

## 2021-06-16 RX ORDER — HEPARIN 100 UNIT/ML
500 SYRINGE INTRAVENOUS
Status: CANCELLED | OUTPATIENT
Start: 2021-06-16

## 2021-06-16 RX ORDER — FLUOROURACIL 50 MG/ML
400 INJECTION, SOLUTION INTRAVENOUS
Status: CANCELLED | OUTPATIENT
Start: 2021-06-16

## 2021-06-16 RX ORDER — SODIUM CHLORIDE 0.9 % (FLUSH) 0.9 %
10 SYRINGE (ML) INJECTION
Status: DISCONTINUED | OUTPATIENT
Start: 2021-06-16 | End: 2021-06-16 | Stop reason: HOSPADM

## 2021-06-16 RX ORDER — ATROPINE SULFATE 0.4 MG/ML
0.4 INJECTION, SOLUTION ENDOTRACHEAL; INTRAMEDULLARY; INTRAMUSCULAR; INTRAVENOUS; SUBCUTANEOUS ONCE AS NEEDED
Status: COMPLETED | OUTPATIENT
Start: 2021-06-16 | End: 2021-06-16

## 2021-06-16 RX ORDER — SODIUM CHLORIDE 0.9 % (FLUSH) 0.9 %
10 SYRINGE (ML) INJECTION
Status: CANCELLED | OUTPATIENT
Start: 2021-06-16

## 2021-06-16 RX ORDER — FLUOROURACIL 50 MG/ML
400 INJECTION, SOLUTION INTRAVENOUS
Status: COMPLETED | OUTPATIENT
Start: 2021-06-16 | End: 2021-06-16

## 2021-06-16 RX ORDER — SODIUM CHLORIDE 0.9 % (FLUSH) 0.9 %
10 SYRINGE (ML) INJECTION
Status: CANCELLED | OUTPATIENT
Start: 2021-06-18

## 2021-06-16 RX ORDER — HEPARIN 100 UNIT/ML
500 SYRINGE INTRAVENOUS
Status: DISCONTINUED | OUTPATIENT
Start: 2021-06-16 | End: 2021-06-16 | Stop reason: HOSPADM

## 2021-06-16 RX ORDER — ATROPINE SULFATE 0.4 MG/ML
0.4 INJECTION, SOLUTION ENDOTRACHEAL; INTRAMEDULLARY; INTRAMUSCULAR; INTRAVENOUS; SUBCUTANEOUS ONCE AS NEEDED
Status: CANCELLED | OUTPATIENT
Start: 2021-06-16

## 2021-06-16 RX ADMIN — SODIUM CHLORIDE: 0.9 INJECTION, SOLUTION INTRAVENOUS at 11:06

## 2021-06-16 RX ADMIN — FLUOROURACIL 990 MG: 50 INJECTION, SOLUTION INTRAVENOUS at 03:06

## 2021-06-16 RX ADMIN — SODIUM CHLORIDE 440 MG: 0.9 INJECTION, SOLUTION INTRAVENOUS at 02:06

## 2021-06-16 RX ADMIN — Medication 10 ML: at 11:06

## 2021-06-16 RX ADMIN — LEUCOVORIN CALCIUM 990 MG: 350 INJECTION, POWDER, LYOPHILIZED, FOR SOLUTION INTRAMUSCULAR; INTRAVENOUS at 02:06

## 2021-06-16 RX ADMIN — BEVACIZUMAB 600 MG: 400 INJECTION, SOLUTION INTRAVENOUS at 12:06

## 2021-06-16 RX ADMIN — FLUOROURACIL 5950 MG: 50 INJECTION, SOLUTION INTRAVENOUS at 03:06

## 2021-06-16 RX ADMIN — PALONOSETRON 0.25 MG: 0.05 INJECTION, SOLUTION INTRAVENOUS at 01:06

## 2021-06-16 RX ADMIN — ATROPINE SULFATE 0.4 MG: 0.4 INJECTION, SOLUTION INTRAMUSCULAR; INTRAVENOUS; SUBCUTANEOUS at 02:06

## 2021-06-18 ENCOUNTER — INFUSION (OUTPATIENT)
Dept: INFUSION THERAPY | Facility: HOSPITAL | Age: 51
End: 2021-06-18
Attending: INTERNAL MEDICINE
Payer: COMMERCIAL

## 2021-06-18 VITALS
RESPIRATION RATE: 18 BRPM | OXYGEN SATURATION: 98 % | HEIGHT: 66 IN | BODY MASS INDEX: 46.77 KG/M2 | SYSTOLIC BLOOD PRESSURE: 127 MMHG | HEART RATE: 75 BPM | DIASTOLIC BLOOD PRESSURE: 80 MMHG | WEIGHT: 291 LBS | TEMPERATURE: 98 F

## 2021-06-18 DIAGNOSIS — C18.7 MALIGNANT NEOPLASM OF SIGMOID COLON: ICD-10-CM

## 2021-06-18 DIAGNOSIS — C77.2 METASTASIS TO INTESTINAL LYMPH NODE: Primary | ICD-10-CM

## 2021-06-18 PROCEDURE — A4216 STERILE WATER/SALINE, 10 ML: HCPCS | Mod: PN | Performed by: INTERNAL MEDICINE

## 2021-06-18 PROCEDURE — 96523 IRRIG DRUG DELIVERY DEVICE: CPT | Mod: PN

## 2021-06-18 PROCEDURE — 63600175 PHARM REV CODE 636 W HCPCS: Mod: PN | Performed by: INTERNAL MEDICINE

## 2021-06-18 PROCEDURE — 25000003 PHARM REV CODE 250: Mod: PN | Performed by: INTERNAL MEDICINE

## 2021-06-18 RX ORDER — HEPARIN 100 UNIT/ML
500 SYRINGE INTRAVENOUS
Status: DISCONTINUED | OUTPATIENT
Start: 2021-06-18 | End: 2021-06-18 | Stop reason: HOSPADM

## 2021-06-18 RX ORDER — SODIUM CHLORIDE 0.9 % (FLUSH) 0.9 %
10 SYRINGE (ML) INJECTION
Status: DISCONTINUED | OUTPATIENT
Start: 2021-06-18 | End: 2021-06-18 | Stop reason: HOSPADM

## 2021-06-18 RX ADMIN — Medication 10 ML: at 01:06

## 2021-06-18 RX ADMIN — HEPARIN 500 UNITS: 100 SYRINGE at 01:06

## 2021-06-23 ENCOUNTER — TELEPHONE (OUTPATIENT)
Dept: INFUSION THERAPY | Facility: HOSPITAL | Age: 51
End: 2021-06-23
Payer: COMMERCIAL

## 2021-06-28 ENCOUNTER — LAB VISIT (OUTPATIENT)
Dept: LAB | Facility: HOSPITAL | Age: 51
End: 2021-06-28
Attending: INTERNAL MEDICINE
Payer: COMMERCIAL

## 2021-06-28 ENCOUNTER — OFFICE VISIT (OUTPATIENT)
Dept: HEMATOLOGY/ONCOLOGY | Facility: CLINIC | Age: 51
End: 2021-06-28
Payer: COMMERCIAL

## 2021-06-28 ENCOUNTER — PATIENT MESSAGE (OUTPATIENT)
Dept: HEMATOLOGY/ONCOLOGY | Facility: CLINIC | Age: 51
End: 2021-06-28

## 2021-06-28 VITALS
OXYGEN SATURATION: 96 % | TEMPERATURE: 97 F | WEIGHT: 284.38 LBS | HEART RATE: 90 BPM | BODY MASS INDEX: 45.7 KG/M2 | DIASTOLIC BLOOD PRESSURE: 86 MMHG | HEIGHT: 66 IN | RESPIRATION RATE: 18 BRPM | SYSTOLIC BLOOD PRESSURE: 116 MMHG

## 2021-06-28 DIAGNOSIS — C77.2 METASTASIS TO INTESTINAL LYMPH NODE: ICD-10-CM

## 2021-06-28 DIAGNOSIS — C18.7 MALIGNANT NEOPLASM OF SIGMOID COLON: Primary | ICD-10-CM

## 2021-06-28 DIAGNOSIS — C18.7 MALIGNANT NEOPLASM OF SIGMOID COLON: ICD-10-CM

## 2021-06-28 LAB
ALBUMIN SERPL BCP-MCNC: 4.1 G/DL (ref 3.5–5.2)
ALP SERPL-CCNC: 182 U/L (ref 55–135)
ALT SERPL W/O P-5'-P-CCNC: 41 U/L (ref 10–44)
ANION GAP SERPL CALC-SCNC: 10 MMOL/L (ref 8–16)
AST SERPL-CCNC: 29 U/L (ref 10–40)
BASOPHILS # BLD AUTO: 0.03 K/UL (ref 0–0.2)
BASOPHILS NFR BLD: 0.7 % (ref 0–1.9)
BILIRUB SERPL-MCNC: 0.3 MG/DL (ref 0.1–1)
BUN SERPL-MCNC: 18 MG/DL (ref 6–20)
CALCIUM SERPL-MCNC: 10.6 MG/DL (ref 8.7–10.5)
CHLORIDE SERPL-SCNC: 108 MMOL/L (ref 95–110)
CO2 SERPL-SCNC: 22 MMOL/L (ref 23–29)
CREAT SERPL-MCNC: 0.9 MG/DL (ref 0.5–1.4)
DIFFERENTIAL METHOD: ABNORMAL
EOSINOPHIL # BLD AUTO: 0.2 K/UL (ref 0–0.5)
EOSINOPHIL NFR BLD: 4.1 % (ref 0–8)
ERYTHROCYTE [DISTWIDTH] IN BLOOD BY AUTOMATED COUNT: 15.8 % (ref 11.5–14.5)
EST. GFR  (AFRICAN AMERICAN): >60 ML/MIN/1.73 M^2
EST. GFR  (NON AFRICAN AMERICAN): >60 ML/MIN/1.73 M^2
GLUCOSE SERPL-MCNC: 104 MG/DL (ref 70–110)
HCT VFR BLD AUTO: 43.2 % (ref 37–48.5)
HGB BLD-MCNC: 13.6 G/DL (ref 12–16)
IMM GRANULOCYTES # BLD AUTO: 0.01 K/UL (ref 0–0.04)
IMM GRANULOCYTES NFR BLD AUTO: 0.2 % (ref 0–0.5)
LYMPHOCYTES # BLD AUTO: 1.9 K/UL (ref 1–4.8)
LYMPHOCYTES NFR BLD: 42.2 % (ref 18–48)
MCH RBC QN AUTO: 27.2 PG (ref 27–31)
MCHC RBC AUTO-ENTMCNC: 31.5 G/DL (ref 32–36)
MCV RBC AUTO: 86 FL (ref 82–98)
MONOCYTES # BLD AUTO: 0.6 K/UL (ref 0.3–1)
MONOCYTES NFR BLD: 12.9 % (ref 4–15)
NEUTROPHILS # BLD AUTO: 1.8 K/UL (ref 1.8–7.7)
NEUTROPHILS NFR BLD: 39.9 % (ref 38–73)
NRBC BLD-RTO: 0 /100 WBC
PLATELET # BLD AUTO: 355 K/UL (ref 150–450)
PMV BLD AUTO: 10.1 FL (ref 9.2–12.9)
POTASSIUM SERPL-SCNC: 4.4 MMOL/L (ref 3.5–5.1)
PROT SERPL-MCNC: 7.3 G/DL (ref 6–8.4)
RBC # BLD AUTO: 5 M/UL (ref 4–5.4)
SODIUM SERPL-SCNC: 140 MMOL/L (ref 136–145)
WBC # BLD AUTO: 4.43 K/UL (ref 3.9–12.7)

## 2021-06-28 PROCEDURE — 1126F AMNT PAIN NOTED NONE PRSNT: CPT | Mod: S$GLB,,, | Performed by: NURSE PRACTITIONER

## 2021-06-28 PROCEDURE — 85025 COMPLETE CBC W/AUTO DIFF WBC: CPT | Mod: PN | Performed by: INTERNAL MEDICINE

## 2021-06-28 PROCEDURE — 99999 PR PBB SHADOW E&M-EST. PATIENT-LVL IV: CPT | Mod: PBBFAC,,, | Performed by: NURSE PRACTITIONER

## 2021-06-28 PROCEDURE — 1126F PR PAIN SEVERITY QUANTIFIED, NO PAIN PRESENT: ICD-10-PCS | Mod: S$GLB,,, | Performed by: NURSE PRACTITIONER

## 2021-06-28 PROCEDURE — 3008F PR BODY MASS INDEX (BMI) DOCUMENTED: ICD-10-PCS | Mod: CPTII,S$GLB,, | Performed by: NURSE PRACTITIONER

## 2021-06-28 PROCEDURE — 3008F BODY MASS INDEX DOCD: CPT | Mod: CPTII,S$GLB,, | Performed by: NURSE PRACTITIONER

## 2021-06-28 PROCEDURE — 80053 COMPREHEN METABOLIC PANEL: CPT | Mod: PN | Performed by: INTERNAL MEDICINE

## 2021-06-28 PROCEDURE — 99999 PR PBB SHADOW E&M-EST. PATIENT-LVL IV: ICD-10-PCS | Mod: PBBFAC,,, | Performed by: NURSE PRACTITIONER

## 2021-06-28 PROCEDURE — 36415 COLL VENOUS BLD VENIPUNCTURE: CPT | Mod: PN | Performed by: INTERNAL MEDICINE

## 2021-06-28 PROCEDURE — 99214 PR OFFICE/OUTPT VISIT, EST, LEVL IV, 30-39 MIN: ICD-10-PCS | Mod: S$GLB,,, | Performed by: NURSE PRACTITIONER

## 2021-06-28 PROCEDURE — 99214 OFFICE O/P EST MOD 30 MIN: CPT | Mod: S$GLB,,, | Performed by: NURSE PRACTITIONER

## 2021-06-28 RX ORDER — SODIUM CHLORIDE 0.9 % (FLUSH) 0.9 %
10 SYRINGE (ML) INJECTION
Status: CANCELLED | OUTPATIENT
Start: 2021-07-02

## 2021-06-28 RX ORDER — HEPARIN 100 UNIT/ML
500 SYRINGE INTRAVENOUS
Status: CANCELLED | OUTPATIENT
Start: 2021-06-30

## 2021-06-28 RX ORDER — SODIUM CHLORIDE 0.9 % (FLUSH) 0.9 %
10 SYRINGE (ML) INJECTION
Status: CANCELLED | OUTPATIENT
Start: 2021-06-30

## 2021-06-28 RX ORDER — HEPARIN 100 UNIT/ML
500 SYRINGE INTRAVENOUS
Status: CANCELLED | OUTPATIENT
Start: 2021-07-02

## 2021-06-28 RX ORDER — ATROPINE SULFATE 0.4 MG/ML
0.4 INJECTION, SOLUTION ENDOTRACHEAL; INTRAMEDULLARY; INTRAMUSCULAR; INTRAVENOUS; SUBCUTANEOUS ONCE AS NEEDED
Status: CANCELLED | OUTPATIENT
Start: 2021-06-30

## 2021-06-28 RX ORDER — FLUOROURACIL 50 MG/ML
400 INJECTION, SOLUTION INTRAVENOUS
Status: CANCELLED | OUTPATIENT
Start: 2021-06-30

## 2021-06-30 ENCOUNTER — INFUSION (OUTPATIENT)
Dept: INFUSION THERAPY | Facility: HOSPITAL | Age: 51
End: 2021-06-30
Attending: INTERNAL MEDICINE
Payer: COMMERCIAL

## 2021-06-30 VITALS
HEIGHT: 66 IN | DIASTOLIC BLOOD PRESSURE: 75 MMHG | BODY MASS INDEX: 45.7 KG/M2 | RESPIRATION RATE: 18 BRPM | WEIGHT: 284.38 LBS | SYSTOLIC BLOOD PRESSURE: 120 MMHG | TEMPERATURE: 98 F | HEART RATE: 93 BPM

## 2021-06-30 DIAGNOSIS — C77.2 METASTASIS TO INTESTINAL LYMPH NODE: Primary | ICD-10-CM

## 2021-06-30 DIAGNOSIS — C18.7 MALIGNANT NEOPLASM OF SIGMOID COLON: ICD-10-CM

## 2021-06-30 PROCEDURE — 96375 TX/PRO/DX INJ NEW DRUG ADDON: CPT | Mod: PN

## 2021-06-30 PROCEDURE — 96367 TX/PROPH/DG ADDL SEQ IV INF: CPT | Mod: PN

## 2021-06-30 PROCEDURE — 96417 CHEMO IV INFUS EACH ADDL SEQ: CPT | Mod: PN

## 2021-06-30 PROCEDURE — 25000003 PHARM REV CODE 250: Mod: PN | Performed by: NURSE PRACTITIONER

## 2021-06-30 PROCEDURE — 96368 THER/DIAG CONCURRENT INF: CPT | Mod: PN

## 2021-06-30 PROCEDURE — 96416 CHEMO PROLONG INFUSE W/PUMP: CPT | Mod: PN

## 2021-06-30 PROCEDURE — 63600175 PHARM REV CODE 636 W HCPCS: Mod: PN | Performed by: NURSE PRACTITIONER

## 2021-06-30 PROCEDURE — 96411 CHEMO IV PUSH ADDL DRUG: CPT | Mod: PN

## 2021-06-30 PROCEDURE — 96413 CHEMO IV INFUSION 1 HR: CPT | Mod: PN

## 2021-06-30 RX ORDER — ATROPINE SULFATE 0.4 MG/ML
0.4 INJECTION, SOLUTION ENDOTRACHEAL; INTRAMEDULLARY; INTRAMUSCULAR; INTRAVENOUS; SUBCUTANEOUS ONCE AS NEEDED
Status: COMPLETED | OUTPATIENT
Start: 2021-06-30 | End: 2021-06-30

## 2021-06-30 RX ORDER — SODIUM CHLORIDE 0.9 % (FLUSH) 0.9 %
10 SYRINGE (ML) INJECTION
Status: DISCONTINUED | OUTPATIENT
Start: 2021-06-30 | End: 2021-06-30 | Stop reason: HOSPADM

## 2021-06-30 RX ORDER — HEPARIN 100 UNIT/ML
500 SYRINGE INTRAVENOUS
Status: DISCONTINUED | OUTPATIENT
Start: 2021-06-30 | End: 2021-06-30 | Stop reason: HOSPADM

## 2021-06-30 RX ORDER — FLUOROURACIL 50 MG/ML
400 INJECTION, SOLUTION INTRAVENOUS
Status: COMPLETED | OUTPATIENT
Start: 2021-06-30 | End: 2021-06-30

## 2021-06-30 RX ADMIN — BEVACIZUMAB 600 MG: 400 INJECTION, SOLUTION INTRAVENOUS at 11:06

## 2021-06-30 RX ADMIN — LEUCOVORIN CALCIUM 990 MG: 100 INJECTION, POWDER, LYOPHILIZED, FOR SOLUTION INTRAMUSCULAR; INTRAVENOUS at 01:06

## 2021-06-30 RX ADMIN — FLUOROURACIL 990 MG: 50 INJECTION, SOLUTION INTRAVENOUS at 02:06

## 2021-06-30 RX ADMIN — ATROPINE SULFATE 0.4 MG: 0.4 INJECTION, SOLUTION INTRAMUSCULAR; INTRAVENOUS; SUBCUTANEOUS at 12:06

## 2021-06-30 RX ADMIN — PALONOSETRON 0.25 MG: 0.05 INJECTION, SOLUTION INTRAVENOUS at 12:06

## 2021-06-30 RX ADMIN — SODIUM CHLORIDE: 0.9 INJECTION, SOLUTION INTRAVENOUS at 10:06

## 2021-06-30 RX ADMIN — SODIUM CHLORIDE 440 MG: 0.9 INJECTION, SOLUTION INTRAVENOUS at 01:06

## 2021-06-30 RX ADMIN — FLUOROURACIL 5950 MG: 50 INJECTION, SOLUTION INTRAVENOUS at 02:06

## 2021-07-02 ENCOUNTER — INFUSION (OUTPATIENT)
Dept: INFUSION THERAPY | Facility: HOSPITAL | Age: 51
End: 2021-07-02
Attending: INTERNAL MEDICINE
Payer: COMMERCIAL

## 2021-07-02 VITALS
WEIGHT: 284.38 LBS | HEIGHT: 66 IN | DIASTOLIC BLOOD PRESSURE: 80 MMHG | BODY MASS INDEX: 45.7 KG/M2 | SYSTOLIC BLOOD PRESSURE: 137 MMHG | RESPIRATION RATE: 18 BRPM | HEART RATE: 87 BPM

## 2021-07-02 DIAGNOSIS — C18.7 MALIGNANT NEOPLASM OF SIGMOID COLON: ICD-10-CM

## 2021-07-02 DIAGNOSIS — C77.2 METASTASIS TO INTESTINAL LYMPH NODE: Primary | ICD-10-CM

## 2021-07-02 PROCEDURE — A4216 STERILE WATER/SALINE, 10 ML: HCPCS | Mod: PN | Performed by: NURSE PRACTITIONER

## 2021-07-02 PROCEDURE — 25000003 PHARM REV CODE 250: Mod: PN | Performed by: NURSE PRACTITIONER

## 2021-07-02 PROCEDURE — 96523 IRRIG DRUG DELIVERY DEVICE: CPT | Mod: PN

## 2021-07-02 PROCEDURE — 63600175 PHARM REV CODE 636 W HCPCS: Mod: PN | Performed by: NURSE PRACTITIONER

## 2021-07-02 RX ORDER — HEPARIN 100 UNIT/ML
500 SYRINGE INTRAVENOUS
Status: DISCONTINUED | OUTPATIENT
Start: 2021-07-02 | End: 2021-07-02 | Stop reason: HOSPADM

## 2021-07-02 RX ORDER — SODIUM CHLORIDE 0.9 % (FLUSH) 0.9 %
10 SYRINGE (ML) INJECTION
Status: DISCONTINUED | OUTPATIENT
Start: 2021-07-02 | End: 2021-07-02 | Stop reason: HOSPADM

## 2021-07-02 RX ADMIN — HEPARIN 500 UNITS: 100 SYRINGE at 12:07

## 2021-07-02 RX ADMIN — Medication 10 ML: at 12:07

## 2021-07-07 ENCOUNTER — PATIENT MESSAGE (OUTPATIENT)
Dept: ADMINISTRATIVE | Facility: HOSPITAL | Age: 51
End: 2021-07-07

## 2021-07-12 ENCOUNTER — OFFICE VISIT (OUTPATIENT)
Dept: HEMATOLOGY/ONCOLOGY | Facility: CLINIC | Age: 51
End: 2021-07-12
Payer: COMMERCIAL

## 2021-07-12 ENCOUNTER — LAB VISIT (OUTPATIENT)
Dept: LAB | Facility: HOSPITAL | Age: 51
End: 2021-07-12
Attending: NURSE PRACTITIONER
Payer: COMMERCIAL

## 2021-07-12 ENCOUNTER — PATIENT MESSAGE (OUTPATIENT)
Dept: HEMATOLOGY/ONCOLOGY | Facility: CLINIC | Age: 51
End: 2021-07-12

## 2021-07-12 VITALS
HEIGHT: 66 IN | HEART RATE: 98 BPM | SYSTOLIC BLOOD PRESSURE: 132 MMHG | TEMPERATURE: 98 F | BODY MASS INDEX: 45.7 KG/M2 | DIASTOLIC BLOOD PRESSURE: 78 MMHG | WEIGHT: 284.38 LBS | RESPIRATION RATE: 16 BRPM

## 2021-07-12 DIAGNOSIS — C18.7 MALIGNANT NEOPLASM OF SIGMOID COLON: Primary | ICD-10-CM

## 2021-07-12 DIAGNOSIS — C77.2 METASTASIS TO INTESTINAL LYMPH NODE: ICD-10-CM

## 2021-07-12 DIAGNOSIS — F32.A DEPRESSION, UNSPECIFIED DEPRESSION TYPE: ICD-10-CM

## 2021-07-12 DIAGNOSIS — C18.7 MALIGNANT NEOPLASM OF SIGMOID COLON: ICD-10-CM

## 2021-07-12 LAB
ALBUMIN SERPL BCP-MCNC: 3.9 G/DL (ref 3.5–5.2)
ALP SERPL-CCNC: 171 U/L (ref 55–135)
ALT SERPL W/O P-5'-P-CCNC: 46 U/L (ref 10–44)
ANION GAP SERPL CALC-SCNC: 10 MMOL/L (ref 8–16)
AST SERPL-CCNC: 27 U/L (ref 10–40)
BASOPHILS # BLD AUTO: 0.02 K/UL (ref 0–0.2)
BASOPHILS NFR BLD: 0.5 % (ref 0–1.9)
BILIRUB SERPL-MCNC: 0.4 MG/DL (ref 0.1–1)
BUN SERPL-MCNC: 14 MG/DL (ref 6–20)
CALCIUM SERPL-MCNC: 11.2 MG/DL (ref 8.7–10.5)
CHLORIDE SERPL-SCNC: 106 MMOL/L (ref 95–110)
CO2 SERPL-SCNC: 24 MMOL/L (ref 23–29)
CREAT SERPL-MCNC: 0.9 MG/DL (ref 0.5–1.4)
DIFFERENTIAL METHOD: ABNORMAL
EOSINOPHIL # BLD AUTO: 0.2 K/UL (ref 0–0.5)
EOSINOPHIL NFR BLD: 4.1 % (ref 0–8)
ERYTHROCYTE [DISTWIDTH] IN BLOOD BY AUTOMATED COUNT: 16.1 % (ref 11.5–14.5)
EST. GFR  (AFRICAN AMERICAN): >60 ML/MIN/1.73 M^2
EST. GFR  (NON AFRICAN AMERICAN): >60 ML/MIN/1.73 M^2
GLUCOSE SERPL-MCNC: 103 MG/DL (ref 70–110)
HCT VFR BLD AUTO: 43.8 % (ref 37–48.5)
HGB BLD-MCNC: 13.7 G/DL (ref 12–16)
IMM GRANULOCYTES # BLD AUTO: 0.01 K/UL (ref 0–0.04)
IMM GRANULOCYTES NFR BLD AUTO: 0.2 % (ref 0–0.5)
LYMPHOCYTES # BLD AUTO: 1.8 K/UL (ref 1–4.8)
LYMPHOCYTES NFR BLD: 43.2 % (ref 18–48)
MCH RBC QN AUTO: 27.7 PG (ref 27–31)
MCHC RBC AUTO-ENTMCNC: 31.3 G/DL (ref 32–36)
MCV RBC AUTO: 89 FL (ref 82–98)
MONOCYTES # BLD AUTO: 0.5 K/UL (ref 0.3–1)
MONOCYTES NFR BLD: 12.9 % (ref 4–15)
NEUTROPHILS # BLD AUTO: 1.6 K/UL (ref 1.8–7.7)
NEUTROPHILS NFR BLD: 39.1 % (ref 38–73)
NRBC BLD-RTO: 0 /100 WBC
PLATELET # BLD AUTO: 308 K/UL (ref 150–450)
PMV BLD AUTO: 9.9 FL (ref 9.2–12.9)
POTASSIUM SERPL-SCNC: 4.2 MMOL/L (ref 3.5–5.1)
PROT SERPL-MCNC: 7.1 G/DL (ref 6–8.4)
RBC # BLD AUTO: 4.94 M/UL (ref 4–5.4)
SODIUM SERPL-SCNC: 140 MMOL/L (ref 136–145)
WBC # BLD AUTO: 4.19 K/UL (ref 3.9–12.7)

## 2021-07-12 PROCEDURE — 99214 PR OFFICE/OUTPT VISIT, EST, LEVL IV, 30-39 MIN: ICD-10-PCS | Mod: S$GLB,,, | Performed by: NURSE PRACTITIONER

## 2021-07-12 PROCEDURE — 1126F AMNT PAIN NOTED NONE PRSNT: CPT | Mod: S$GLB,,, | Performed by: NURSE PRACTITIONER

## 2021-07-12 PROCEDURE — 99999 PR PBB SHADOW E&M-EST. PATIENT-LVL IV: CPT | Mod: PBBFAC,,, | Performed by: NURSE PRACTITIONER

## 2021-07-12 PROCEDURE — 36415 COLL VENOUS BLD VENIPUNCTURE: CPT | Mod: PN | Performed by: NURSE PRACTITIONER

## 2021-07-12 PROCEDURE — 99214 OFFICE O/P EST MOD 30 MIN: CPT | Mod: S$GLB,,, | Performed by: NURSE PRACTITIONER

## 2021-07-12 PROCEDURE — 3008F BODY MASS INDEX DOCD: CPT | Mod: CPTII,S$GLB,, | Performed by: NURSE PRACTITIONER

## 2021-07-12 PROCEDURE — 3008F PR BODY MASS INDEX (BMI) DOCUMENTED: ICD-10-PCS | Mod: CPTII,S$GLB,, | Performed by: NURSE PRACTITIONER

## 2021-07-12 PROCEDURE — 80053 COMPREHEN METABOLIC PANEL: CPT | Mod: PN | Performed by: NURSE PRACTITIONER

## 2021-07-12 PROCEDURE — 1126F PR PAIN SEVERITY QUANTIFIED, NO PAIN PRESENT: ICD-10-PCS | Mod: S$GLB,,, | Performed by: NURSE PRACTITIONER

## 2021-07-12 PROCEDURE — 85025 COMPLETE CBC W/AUTO DIFF WBC: CPT | Mod: PN | Performed by: NURSE PRACTITIONER

## 2021-07-12 PROCEDURE — 99999 PR PBB SHADOW E&M-EST. PATIENT-LVL IV: ICD-10-PCS | Mod: PBBFAC,,, | Performed by: NURSE PRACTITIONER

## 2021-07-12 RX ORDER — SODIUM CHLORIDE 0.9 % (FLUSH) 0.9 %
10 SYRINGE (ML) INJECTION
Status: CANCELLED | OUTPATIENT
Start: 2021-07-16

## 2021-07-12 RX ORDER — SODIUM CHLORIDE 0.9 % (FLUSH) 0.9 %
10 SYRINGE (ML) INJECTION
Status: CANCELLED | OUTPATIENT
Start: 2021-07-14

## 2021-07-12 RX ORDER — HEPARIN 100 UNIT/ML
500 SYRINGE INTRAVENOUS
Status: CANCELLED | OUTPATIENT
Start: 2021-07-14

## 2021-07-12 RX ORDER — FLUOROURACIL 50 MG/ML
400 INJECTION, SOLUTION INTRAVENOUS
Status: CANCELLED | OUTPATIENT
Start: 2021-07-14

## 2021-07-12 RX ORDER — HEPARIN 100 UNIT/ML
500 SYRINGE INTRAVENOUS
Status: CANCELLED | OUTPATIENT
Start: 2021-07-16

## 2021-07-12 RX ORDER — BUPROPION HYDROCHLORIDE 150 MG/1
150 TABLET, EXTENDED RELEASE ORAL 2 TIMES DAILY
Qty: 180 TABLET | Refills: 0 | Status: SHIPPED | OUTPATIENT
Start: 2021-07-12 | End: 2021-11-15 | Stop reason: SDUPTHER

## 2021-07-12 RX ORDER — ATROPINE SULFATE 0.4 MG/ML
0.4 INJECTION, SOLUTION ENDOTRACHEAL; INTRAMEDULLARY; INTRAMUSCULAR; INTRAVENOUS; SUBCUTANEOUS ONCE AS NEEDED
Status: CANCELLED | OUTPATIENT
Start: 2021-07-14

## 2021-07-13 ENCOUNTER — DOCUMENTATION ONLY (OUTPATIENT)
Dept: HEMATOLOGY/ONCOLOGY | Facility: CLINIC | Age: 51
End: 2021-07-13

## 2021-07-14 ENCOUNTER — DOCUMENTATION ONLY (OUTPATIENT)
Dept: INFUSION THERAPY | Facility: HOSPITAL | Age: 51
End: 2021-07-14

## 2021-07-14 ENCOUNTER — INFUSION (OUTPATIENT)
Dept: INFUSION THERAPY | Facility: HOSPITAL | Age: 51
End: 2021-07-14
Attending: INTERNAL MEDICINE
Payer: COMMERCIAL

## 2021-07-14 VITALS
BODY MASS INDEX: 45.46 KG/M2 | HEIGHT: 66 IN | TEMPERATURE: 98 F | WEIGHT: 282.88 LBS | DIASTOLIC BLOOD PRESSURE: 89 MMHG | HEART RATE: 75 BPM | SYSTOLIC BLOOD PRESSURE: 138 MMHG | RESPIRATION RATE: 16 BRPM

## 2021-07-14 DIAGNOSIS — C77.2 METASTASIS TO INTESTINAL LYMPH NODE: Primary | ICD-10-CM

## 2021-07-14 DIAGNOSIS — C18.7 MALIGNANT NEOPLASM OF SIGMOID COLON: ICD-10-CM

## 2021-07-14 PROCEDURE — 96411 CHEMO IV PUSH ADDL DRUG: CPT | Mod: PN

## 2021-07-14 PROCEDURE — 96376 TX/PRO/DX INJ SAME DRUG ADON: CPT | Mod: PN

## 2021-07-14 PROCEDURE — 96367 TX/PROPH/DG ADDL SEQ IV INF: CPT | Mod: PN

## 2021-07-14 PROCEDURE — 96417 CHEMO IV INFUS EACH ADDL SEQ: CPT | Mod: PN

## 2021-07-14 PROCEDURE — 96368 THER/DIAG CONCURRENT INF: CPT | Mod: PN

## 2021-07-14 PROCEDURE — 96415 CHEMO IV INFUSION ADDL HR: CPT | Mod: PN

## 2021-07-14 PROCEDURE — 96416 CHEMO PROLONG INFUSE W/PUMP: CPT | Mod: PN

## 2021-07-14 PROCEDURE — 96413 CHEMO IV INFUSION 1 HR: CPT | Mod: PN

## 2021-07-14 PROCEDURE — 25000003 PHARM REV CODE 250: Mod: PN | Performed by: NURSE PRACTITIONER

## 2021-07-14 PROCEDURE — 63600175 PHARM REV CODE 636 W HCPCS: Mod: PN | Performed by: NURSE PRACTITIONER

## 2021-07-14 RX ORDER — HEPARIN 100 UNIT/ML
500 SYRINGE INTRAVENOUS
Status: DISCONTINUED | OUTPATIENT
Start: 2021-07-14 | End: 2021-07-14 | Stop reason: HOSPADM

## 2021-07-14 RX ORDER — ATROPINE SULFATE 0.4 MG/ML
0.4 INJECTION, SOLUTION ENDOTRACHEAL; INTRAMEDULLARY; INTRAMUSCULAR; INTRAVENOUS; SUBCUTANEOUS ONCE AS NEEDED
Status: COMPLETED | OUTPATIENT
Start: 2021-07-14 | End: 2021-07-14

## 2021-07-14 RX ORDER — FLUOROURACIL 50 MG/ML
400 INJECTION, SOLUTION INTRAVENOUS
Status: COMPLETED | OUTPATIENT
Start: 2021-07-14 | End: 2021-07-14

## 2021-07-14 RX ORDER — SODIUM CHLORIDE 0.9 % (FLUSH) 0.9 %
10 SYRINGE (ML) INJECTION
Status: DISCONTINUED | OUTPATIENT
Start: 2021-07-14 | End: 2021-07-14 | Stop reason: HOSPADM

## 2021-07-14 RX ADMIN — ATROPINE SULFATE 0.4 MG: 0.4 INJECTION, SOLUTION INTRAMUSCULAR; INTRAVENOUS; SUBCUTANEOUS at 12:07

## 2021-07-14 RX ADMIN — FLUOROURACIL 990 MG: 50 INJECTION, SOLUTION INTRAVENOUS at 01:07

## 2021-07-14 RX ADMIN — SODIUM CHLORIDE: 0.9 INJECTION, SOLUTION INTRAVENOUS at 10:07

## 2021-07-14 RX ADMIN — PALONOSETRON HYDROCHLORIDE 0.25 MG: 0.25 INJECTION, SOLUTION INTRAVENOUS at 11:07

## 2021-07-14 RX ADMIN — DEXTROSE 990 MG: 5 SOLUTION INTRAVENOUS at 12:07

## 2021-07-14 RX ADMIN — SODIUM CHLORIDE 440 MG: 0.9 INJECTION, SOLUTION INTRAVENOUS at 12:07

## 2021-07-14 RX ADMIN — BEVACIZUMAB 600 MG: 400 INJECTION, SOLUTION INTRAVENOUS at 11:07

## 2021-07-14 RX ADMIN — FLUOROURACIL 5950 MG: 50 INJECTION, SOLUTION INTRAVENOUS at 02:07

## 2021-07-16 ENCOUNTER — INFUSION (OUTPATIENT)
Dept: INFUSION THERAPY | Facility: HOSPITAL | Age: 51
End: 2021-07-16
Attending: INTERNAL MEDICINE
Payer: COMMERCIAL

## 2021-07-16 VITALS
HEART RATE: 73 BPM | RESPIRATION RATE: 16 BRPM | TEMPERATURE: 98 F | SYSTOLIC BLOOD PRESSURE: 132 MMHG | DIASTOLIC BLOOD PRESSURE: 85 MMHG

## 2021-07-16 DIAGNOSIS — C77.2 METASTASIS TO INTESTINAL LYMPH NODE: Primary | ICD-10-CM

## 2021-07-16 DIAGNOSIS — C18.7 MALIGNANT NEOPLASM OF SIGMOID COLON: ICD-10-CM

## 2021-07-16 PROCEDURE — 96523 IRRIG DRUG DELIVERY DEVICE: CPT | Mod: PN

## 2021-07-16 PROCEDURE — 63600175 PHARM REV CODE 636 W HCPCS: Mod: PN | Performed by: NURSE PRACTITIONER

## 2021-07-16 PROCEDURE — A4216 STERILE WATER/SALINE, 10 ML: HCPCS | Mod: PN | Performed by: NURSE PRACTITIONER

## 2021-07-16 PROCEDURE — 25000003 PHARM REV CODE 250: Mod: PN | Performed by: NURSE PRACTITIONER

## 2021-07-16 RX ORDER — HEPARIN 100 UNIT/ML
500 SYRINGE INTRAVENOUS
Status: DISCONTINUED | OUTPATIENT
Start: 2021-07-16 | End: 2021-07-16 | Stop reason: HOSPADM

## 2021-07-16 RX ORDER — SODIUM CHLORIDE 0.9 % (FLUSH) 0.9 %
10 SYRINGE (ML) INJECTION
Status: DISCONTINUED | OUTPATIENT
Start: 2021-07-16 | End: 2021-07-16 | Stop reason: HOSPADM

## 2021-07-16 RX ADMIN — Medication 10 ML: at 02:07

## 2021-07-16 RX ADMIN — HEPARIN 500 UNITS: 100 SYRINGE at 02:07

## 2021-07-19 ENCOUNTER — OFFICE VISIT (OUTPATIENT)
Dept: PSYCHIATRY | Facility: CLINIC | Age: 51
End: 2021-07-19
Payer: COMMERCIAL

## 2021-07-19 DIAGNOSIS — F33.1 MODERATE EPISODE OF RECURRENT MAJOR DEPRESSIVE DISORDER: Primary | ICD-10-CM

## 2021-07-19 DIAGNOSIS — F41.1 ANXIETY ASSOCIATED WITH CANCER DIAGNOSIS: ICD-10-CM

## 2021-07-19 DIAGNOSIS — C80.1 ANXIETY ASSOCIATED WITH CANCER DIAGNOSIS: ICD-10-CM

## 2021-07-19 PROCEDURE — 90791 PSYCH DIAGNOSTIC EVALUATION: CPT | Mod: ,,, | Performed by: PSYCHOLOGIST

## 2021-07-19 PROCEDURE — 90791 PR PSYCHIATRIC DIAGNOSTIC EVALUATION: ICD-10-PCS | Mod: ,,, | Performed by: PSYCHOLOGIST

## 2021-07-21 ENCOUNTER — PATIENT MESSAGE (OUTPATIENT)
Dept: HEMATOLOGY/ONCOLOGY | Facility: CLINIC | Age: 51
End: 2021-07-21

## 2021-07-22 ENCOUNTER — PATIENT MESSAGE (OUTPATIENT)
Dept: HEMATOLOGY/ONCOLOGY | Facility: CLINIC | Age: 51
End: 2021-07-22

## 2021-07-24 ENCOUNTER — PATIENT MESSAGE (OUTPATIENT)
Dept: HEMATOLOGY/ONCOLOGY | Facility: CLINIC | Age: 51
End: 2021-07-24

## 2021-07-26 ENCOUNTER — PATIENT MESSAGE (OUTPATIENT)
Dept: HEMATOLOGY/ONCOLOGY | Facility: CLINIC | Age: 51
End: 2021-07-26

## 2021-07-27 ENCOUNTER — PATIENT MESSAGE (OUTPATIENT)
Dept: HEMATOLOGY/ONCOLOGY | Facility: CLINIC | Age: 51
End: 2021-07-27

## 2021-07-27 ENCOUNTER — TELEPHONE (OUTPATIENT)
Dept: HEMATOLOGY/ONCOLOGY | Facility: CLINIC | Age: 51
End: 2021-07-27

## 2021-07-28 ENCOUNTER — OFFICE VISIT (OUTPATIENT)
Dept: HEMATOLOGY/ONCOLOGY | Facility: CLINIC | Age: 51
End: 2021-07-28
Payer: COMMERCIAL

## 2021-07-28 ENCOUNTER — DOCUMENTATION ONLY (OUTPATIENT)
Dept: INFUSION THERAPY | Facility: HOSPITAL | Age: 51
End: 2021-07-28

## 2021-07-28 ENCOUNTER — INFUSION (OUTPATIENT)
Dept: INFUSION THERAPY | Facility: HOSPITAL | Age: 51
End: 2021-07-28
Attending: INTERNAL MEDICINE
Payer: COMMERCIAL

## 2021-07-28 ENCOUNTER — LAB VISIT (OUTPATIENT)
Dept: LAB | Facility: HOSPITAL | Age: 51
End: 2021-07-28
Attending: INTERNAL MEDICINE
Payer: COMMERCIAL

## 2021-07-28 VITALS
DIASTOLIC BLOOD PRESSURE: 79 MMHG | RESPIRATION RATE: 18 BRPM | WEIGHT: 284.81 LBS | BODY MASS INDEX: 45.77 KG/M2 | HEART RATE: 74 BPM | TEMPERATURE: 98 F | HEIGHT: 66 IN | SYSTOLIC BLOOD PRESSURE: 128 MMHG

## 2021-07-28 VITALS
TEMPERATURE: 98 F | SYSTOLIC BLOOD PRESSURE: 126 MMHG | WEIGHT: 284.81 LBS | DIASTOLIC BLOOD PRESSURE: 80 MMHG | HEART RATE: 87 BPM | BODY MASS INDEX: 45.77 KG/M2 | HEIGHT: 66 IN | OXYGEN SATURATION: 97 %

## 2021-07-28 DIAGNOSIS — E83.52 HYPERCALCEMIA: ICD-10-CM

## 2021-07-28 DIAGNOSIS — E66.01 MORBID OBESITY: ICD-10-CM

## 2021-07-28 DIAGNOSIS — F32.A DEPRESSION, UNSPECIFIED DEPRESSION TYPE: ICD-10-CM

## 2021-07-28 DIAGNOSIS — C18.7 MALIGNANT NEOPLASM OF SIGMOID COLON: ICD-10-CM

## 2021-07-28 DIAGNOSIS — E83.52 HYPERCALCEMIA: Primary | ICD-10-CM

## 2021-07-28 DIAGNOSIS — F41.9 ANXIETY: ICD-10-CM

## 2021-07-28 DIAGNOSIS — E03.9 HYPOTHYROIDISM, UNSPECIFIED TYPE: ICD-10-CM

## 2021-07-28 DIAGNOSIS — C77.2 METASTASIS TO INTESTINAL LYMPH NODE: ICD-10-CM

## 2021-07-28 DIAGNOSIS — C18.7 MALIGNANT NEOPLASM OF SIGMOID COLON: Primary | ICD-10-CM

## 2021-07-28 LAB
ALBUMIN SERPL BCP-MCNC: 3.7 G/DL (ref 3.5–5.2)
ALP SERPL-CCNC: 149 U/L (ref 55–135)
ALT SERPL W/O P-5'-P-CCNC: 39 U/L (ref 10–44)
ANION GAP SERPL CALC-SCNC: 11 MMOL/L (ref 8–16)
AST SERPL-CCNC: 27 U/L (ref 10–40)
BASOPHILS # BLD AUTO: 0.04 K/UL (ref 0–0.2)
BASOPHILS NFR BLD: 1 % (ref 0–1.9)
BILIRUB SERPL-MCNC: 0.5 MG/DL (ref 0.1–1)
BUN SERPL-MCNC: 17 MG/DL (ref 6–20)
CALCIUM SERPL-MCNC: 10.7 MG/DL (ref 8.7–10.5)
CHLORIDE SERPL-SCNC: 109 MMOL/L (ref 95–110)
CO2 SERPL-SCNC: 21 MMOL/L (ref 23–29)
CREAT SERPL-MCNC: 0.9 MG/DL (ref 0.5–1.4)
DIFFERENTIAL METHOD: ABNORMAL
EOSINOPHIL # BLD AUTO: 0.2 K/UL (ref 0–0.5)
EOSINOPHIL NFR BLD: 5.3 % (ref 0–8)
ERYTHROCYTE [DISTWIDTH] IN BLOOD BY AUTOMATED COUNT: 16.1 % (ref 11.5–14.5)
EST. GFR  (AFRICAN AMERICAN): >60 ML/MIN/1.73 M^2
EST. GFR  (NON AFRICAN AMERICAN): >60 ML/MIN/1.73 M^2
GLUCOSE SERPL-MCNC: 122 MG/DL (ref 70–110)
HCT VFR BLD AUTO: 43 % (ref 37–48.5)
HGB BLD-MCNC: 13.4 G/DL (ref 12–16)
IMM GRANULOCYTES # BLD AUTO: 0.01 K/UL (ref 0–0.04)
IMM GRANULOCYTES NFR BLD AUTO: 0.2 % (ref 0–0.5)
LYMPHOCYTES # BLD AUTO: 1.6 K/UL (ref 1–4.8)
LYMPHOCYTES NFR BLD: 37.7 % (ref 18–48)
MAGNESIUM SERPL-MCNC: 2 MG/DL (ref 1.6–2.6)
MCH RBC QN AUTO: 27.6 PG (ref 27–31)
MCHC RBC AUTO-ENTMCNC: 31.2 G/DL (ref 32–36)
MCV RBC AUTO: 89 FL (ref 82–98)
MONOCYTES # BLD AUTO: 0.4 K/UL (ref 0.3–1)
MONOCYTES NFR BLD: 9.2 % (ref 4–15)
NEUTROPHILS # BLD AUTO: 1.9 K/UL (ref 1.8–7.7)
NEUTROPHILS NFR BLD: 46.6 % (ref 38–73)
NRBC BLD-RTO: 0 /100 WBC
PLATELET # BLD AUTO: 258 K/UL (ref 150–450)
PMV BLD AUTO: 9.6 FL (ref 9.2–12.9)
POTASSIUM SERPL-SCNC: 4 MMOL/L (ref 3.5–5.1)
PROT SERPL-MCNC: 6.9 G/DL (ref 6–8.4)
RBC # BLD AUTO: 4.86 M/UL (ref 4–5.4)
SODIUM SERPL-SCNC: 141 MMOL/L (ref 136–145)
WBC # BLD AUTO: 4.14 K/UL (ref 3.9–12.7)

## 2021-07-28 PROCEDURE — 83735 ASSAY OF MAGNESIUM: CPT | Mod: PN | Performed by: NURSE PRACTITIONER

## 2021-07-28 PROCEDURE — 3074F SYST BP LT 130 MM HG: CPT | Mod: CPTII,S$GLB,, | Performed by: INTERNAL MEDICINE

## 2021-07-28 PROCEDURE — 99214 PR OFFICE/OUTPT VISIT, EST, LEVL IV, 30-39 MIN: ICD-10-PCS | Mod: S$GLB,,, | Performed by: INTERNAL MEDICINE

## 2021-07-28 PROCEDURE — 96415 CHEMO IV INFUSION ADDL HR: CPT | Mod: PN

## 2021-07-28 PROCEDURE — 3074F PR MOST RECENT SYSTOLIC BLOOD PRESSURE < 130 MM HG: ICD-10-PCS | Mod: CPTII,S$GLB,, | Performed by: INTERNAL MEDICINE

## 2021-07-28 PROCEDURE — 3079F PR MOST RECENT DIASTOLIC BLOOD PRESSURE 80-89 MM HG: ICD-10-PCS | Mod: CPTII,S$GLB,, | Performed by: INTERNAL MEDICINE

## 2021-07-28 PROCEDURE — 99214 OFFICE O/P EST MOD 30 MIN: CPT | Mod: S$GLB,,, | Performed by: INTERNAL MEDICINE

## 2021-07-28 PROCEDURE — 96375 TX/PRO/DX INJ NEW DRUG ADDON: CPT | Mod: PN

## 2021-07-28 PROCEDURE — 3008F BODY MASS INDEX DOCD: CPT | Mod: CPTII,S$GLB,, | Performed by: INTERNAL MEDICINE

## 2021-07-28 PROCEDURE — 96413 CHEMO IV INFUSION 1 HR: CPT | Mod: PN

## 2021-07-28 PROCEDURE — 96411 CHEMO IV PUSH ADDL DRUG: CPT | Mod: PN

## 2021-07-28 PROCEDURE — 3008F PR BODY MASS INDEX (BMI) DOCUMENTED: ICD-10-PCS | Mod: CPTII,S$GLB,, | Performed by: INTERNAL MEDICINE

## 2021-07-28 PROCEDURE — 3079F DIAST BP 80-89 MM HG: CPT | Mod: CPTII,S$GLB,, | Performed by: INTERNAL MEDICINE

## 2021-07-28 PROCEDURE — 96367 TX/PROPH/DG ADDL SEQ IV INF: CPT | Mod: PN

## 2021-07-28 PROCEDURE — 99999 PR PBB SHADOW E&M-EST. PATIENT-LVL IV: CPT | Mod: PBBFAC,,, | Performed by: INTERNAL MEDICINE

## 2021-07-28 PROCEDURE — 1126F PR PAIN SEVERITY QUANTIFIED, NO PAIN PRESENT: ICD-10-PCS | Mod: CPTII,S$GLB,, | Performed by: INTERNAL MEDICINE

## 2021-07-28 PROCEDURE — 80053 COMPREHEN METABOLIC PANEL: CPT | Mod: PN | Performed by: INTERNAL MEDICINE

## 2021-07-28 PROCEDURE — 85025 COMPLETE CBC W/AUTO DIFF WBC: CPT | Mod: PN | Performed by: INTERNAL MEDICINE

## 2021-07-28 PROCEDURE — 63600175 PHARM REV CODE 636 W HCPCS: Mod: PN | Performed by: INTERNAL MEDICINE

## 2021-07-28 PROCEDURE — 36415 COLL VENOUS BLD VENIPUNCTURE: CPT | Mod: PN | Performed by: INTERNAL MEDICINE

## 2021-07-28 PROCEDURE — 25000003 PHARM REV CODE 250: Mod: PN | Performed by: INTERNAL MEDICINE

## 2021-07-28 PROCEDURE — 96368 THER/DIAG CONCURRENT INF: CPT | Mod: PN

## 2021-07-28 PROCEDURE — 1126F AMNT PAIN NOTED NONE PRSNT: CPT | Mod: CPTII,S$GLB,, | Performed by: INTERNAL MEDICINE

## 2021-07-28 PROCEDURE — 96361 HYDRATE IV INFUSION ADD-ON: CPT | Mod: PN

## 2021-07-28 PROCEDURE — 96417 CHEMO IV INFUS EACH ADDL SEQ: CPT | Mod: PN

## 2021-07-28 PROCEDURE — 96416 CHEMO PROLONG INFUSE W/PUMP: CPT | Mod: PN

## 2021-07-28 PROCEDURE — 99999 PR PBB SHADOW E&M-EST. PATIENT-LVL IV: ICD-10-PCS | Mod: PBBFAC,,, | Performed by: INTERNAL MEDICINE

## 2021-07-28 RX ORDER — FLUOROURACIL 50 MG/ML
400 INJECTION, SOLUTION INTRAVENOUS
Status: COMPLETED | OUTPATIENT
Start: 2021-07-28 | End: 2021-07-28

## 2021-07-28 RX ORDER — HEPARIN 100 UNIT/ML
500 SYRINGE INTRAVENOUS
Status: DISCONTINUED | OUTPATIENT
Start: 2021-07-28 | End: 2021-07-28 | Stop reason: HOSPADM

## 2021-07-28 RX ORDER — SODIUM CHLORIDE 0.9 % (FLUSH) 0.9 %
10 SYRINGE (ML) INJECTION
Status: CANCELLED | OUTPATIENT
Start: 2021-07-30

## 2021-07-28 RX ORDER — ATROPINE SULFATE 0.4 MG/ML
0.4 INJECTION, SOLUTION ENDOTRACHEAL; INTRAMEDULLARY; INTRAMUSCULAR; INTRAVENOUS; SUBCUTANEOUS ONCE AS NEEDED
Status: CANCELLED | OUTPATIENT
Start: 2021-07-28

## 2021-07-28 RX ORDER — ATROPINE SULFATE 0.4 MG/ML
0.4 INJECTION, SOLUTION ENDOTRACHEAL; INTRAMEDULLARY; INTRAMUSCULAR; INTRAVENOUS; SUBCUTANEOUS ONCE AS NEEDED
Status: COMPLETED | OUTPATIENT
Start: 2021-07-28 | End: 2021-07-28

## 2021-07-28 RX ORDER — SODIUM CHLORIDE 0.9 % (FLUSH) 0.9 %
10 SYRINGE (ML) INJECTION
Status: CANCELLED | OUTPATIENT
Start: 2021-07-28

## 2021-07-28 RX ORDER — HEPARIN 100 UNIT/ML
500 SYRINGE INTRAVENOUS
Status: CANCELLED | OUTPATIENT
Start: 2021-07-28

## 2021-07-28 RX ORDER — SODIUM CHLORIDE 0.9 % (FLUSH) 0.9 %
10 SYRINGE (ML) INJECTION
Status: DISCONTINUED | OUTPATIENT
Start: 2021-07-28 | End: 2021-07-28 | Stop reason: HOSPADM

## 2021-07-28 RX ORDER — FLUOROURACIL 50 MG/ML
400 INJECTION, SOLUTION INTRAVENOUS
Status: CANCELLED | OUTPATIENT
Start: 2021-07-28

## 2021-07-28 RX ORDER — HEPARIN 100 UNIT/ML
500 SYRINGE INTRAVENOUS
Status: CANCELLED | OUTPATIENT
Start: 2021-07-30

## 2021-07-28 RX ORDER — SODIUM CHLORIDE 9 MG/ML
1000 INJECTION, SOLUTION INTRAVENOUS
Status: COMPLETED | OUTPATIENT
Start: 2021-07-28 | End: 2021-07-28

## 2021-07-28 RX ADMIN — BEVACIZUMAB 600 MG: 400 INJECTION, SOLUTION INTRAVENOUS at 03:07

## 2021-07-28 RX ADMIN — FLUOROURACIL 980 MG: 50 INJECTION, SOLUTION INTRAVENOUS at 05:07

## 2021-07-28 RX ADMIN — PALONOSETRON HYDROCHLORIDE 0.25 MG: 0.25 INJECTION, SOLUTION INTRAVENOUS at 03:07

## 2021-07-28 RX ADMIN — SODIUM CHLORIDE: 0.9 INJECTION, SOLUTION INTRAVENOUS at 03:07

## 2021-07-28 RX ADMIN — ATROPINE SULFATE 0.4 MG: 0.4 INJECTION, SOLUTION INTRAMUSCULAR; INTRAVENOUS; SUBCUTANEOUS at 03:07

## 2021-07-28 RX ADMIN — FLUOROURACIL 5880 MG: 50 INJECTION, SOLUTION INTRAVENOUS at 06:07

## 2021-07-28 RX ADMIN — DEXTROSE 980 MG: 5 SOLUTION INTRAVENOUS at 04:07

## 2021-07-28 RX ADMIN — SODIUM CHLORIDE 440 MG: 0.9 INJECTION, SOLUTION INTRAVENOUS at 04:07

## 2021-07-28 RX ADMIN — SODIUM CHLORIDE 1000 ML: 0.9 INJECTION, SOLUTION INTRAVENOUS at 02:07

## 2021-07-29 ENCOUNTER — PATIENT MESSAGE (OUTPATIENT)
Dept: HEMATOLOGY/ONCOLOGY | Facility: CLINIC | Age: 51
End: 2021-07-29

## 2021-07-30 ENCOUNTER — INFUSION (OUTPATIENT)
Dept: INFUSION THERAPY | Facility: HOSPITAL | Age: 51
End: 2021-07-30
Attending: INTERNAL MEDICINE
Payer: COMMERCIAL

## 2021-07-30 VITALS
HEART RATE: 83 BPM | DIASTOLIC BLOOD PRESSURE: 76 MMHG | RESPIRATION RATE: 18 BRPM | HEIGHT: 66 IN | TEMPERATURE: 98 F | WEIGHT: 284.81 LBS | BODY MASS INDEX: 45.77 KG/M2 | SYSTOLIC BLOOD PRESSURE: 134 MMHG

## 2021-07-30 DIAGNOSIS — C18.7 MALIGNANT NEOPLASM OF SIGMOID COLON: ICD-10-CM

## 2021-07-30 DIAGNOSIS — C77.2 METASTASIS TO INTESTINAL LYMPH NODE: Primary | ICD-10-CM

## 2021-07-30 PROCEDURE — 96523 IRRIG DRUG DELIVERY DEVICE: CPT | Mod: PN

## 2021-07-30 PROCEDURE — 25000003 PHARM REV CODE 250: Mod: PN | Performed by: INTERNAL MEDICINE

## 2021-07-30 PROCEDURE — A4216 STERILE WATER/SALINE, 10 ML: HCPCS | Mod: PN | Performed by: INTERNAL MEDICINE

## 2021-07-30 PROCEDURE — 63600175 PHARM REV CODE 636 W HCPCS: Mod: PN | Performed by: INTERNAL MEDICINE

## 2021-07-30 RX ORDER — HEPARIN 100 UNIT/ML
500 SYRINGE INTRAVENOUS
Status: DISCONTINUED | OUTPATIENT
Start: 2021-07-30 | End: 2021-07-30 | Stop reason: HOSPADM

## 2021-07-30 RX ORDER — SODIUM CHLORIDE 0.9 % (FLUSH) 0.9 %
10 SYRINGE (ML) INJECTION
Status: DISCONTINUED | OUTPATIENT
Start: 2021-07-30 | End: 2021-07-30 | Stop reason: HOSPADM

## 2021-07-30 RX ADMIN — Medication 10 ML: at 04:07

## 2021-07-30 RX ADMIN — HEPARIN 500 UNITS: 100 SYRINGE at 04:07

## 2021-08-06 ENCOUNTER — TELEPHONE (OUTPATIENT)
Dept: HEMATOLOGY/ONCOLOGY | Facility: CLINIC | Age: 51
End: 2021-08-06

## 2021-08-09 ENCOUNTER — TELEPHONE (OUTPATIENT)
Dept: HEMATOLOGY/ONCOLOGY | Facility: CLINIC | Age: 51
End: 2021-08-09

## 2021-08-09 ENCOUNTER — OFFICE VISIT (OUTPATIENT)
Dept: HEMATOLOGY/ONCOLOGY | Facility: CLINIC | Age: 51
End: 2021-08-09
Payer: COMMERCIAL

## 2021-08-09 ENCOUNTER — PATIENT MESSAGE (OUTPATIENT)
Dept: HEMATOLOGY/ONCOLOGY | Facility: CLINIC | Age: 51
End: 2021-08-09

## 2021-08-09 ENCOUNTER — LAB VISIT (OUTPATIENT)
Dept: LAB | Facility: HOSPITAL | Age: 51
End: 2021-08-09
Attending: NURSE PRACTITIONER
Payer: COMMERCIAL

## 2021-08-09 VITALS
HEIGHT: 66 IN | TEMPERATURE: 98 F | BODY MASS INDEX: 46.38 KG/M2 | DIASTOLIC BLOOD PRESSURE: 80 MMHG | OXYGEN SATURATION: 96 % | WEIGHT: 288.56 LBS | SYSTOLIC BLOOD PRESSURE: 138 MMHG | HEART RATE: 82 BPM

## 2021-08-09 DIAGNOSIS — C18.7 MALIGNANT NEOPLASM OF SIGMOID COLON: ICD-10-CM

## 2021-08-09 DIAGNOSIS — E83.52 HYPERCALCEMIA: ICD-10-CM

## 2021-08-09 DIAGNOSIS — C77.2 METASTASIS TO INTESTINAL LYMPH NODE: ICD-10-CM

## 2021-08-09 DIAGNOSIS — R05.9 COUGH: Primary | ICD-10-CM

## 2021-08-09 DIAGNOSIS — F32.A DEPRESSION, UNSPECIFIED DEPRESSION TYPE: ICD-10-CM

## 2021-08-09 DIAGNOSIS — F41.9 ANXIETY: ICD-10-CM

## 2021-08-09 DIAGNOSIS — E03.9 HYPOTHYROIDISM, UNSPECIFIED TYPE: ICD-10-CM

## 2021-08-09 LAB
ALBUMIN SERPL BCP-MCNC: 3.7 G/DL (ref 3.5–5.2)
ALP SERPL-CCNC: 146 U/L (ref 55–135)
ALT SERPL W/O P-5'-P-CCNC: 59 U/L (ref 10–44)
ANION GAP SERPL CALC-SCNC: 11 MMOL/L (ref 8–16)
AST SERPL-CCNC: 38 U/L (ref 10–40)
BILIRUB SERPL-MCNC: 0.4 MG/DL (ref 0.1–1)
BUN SERPL-MCNC: 19 MG/DL (ref 6–20)
CALCIUM SERPL-MCNC: 10.9 MG/DL (ref 8.7–10.5)
CHLORIDE SERPL-SCNC: 108 MMOL/L (ref 95–110)
CO2 SERPL-SCNC: 21 MMOL/L (ref 23–29)
CREAT SERPL-MCNC: 1 MG/DL (ref 0.5–1.4)
ERYTHROCYTE [DISTWIDTH] IN BLOOD BY AUTOMATED COUNT: 16.5 % (ref 11.5–14.5)
EST. GFR  (AFRICAN AMERICAN): >60 ML/MIN/1.73 M^2
EST. GFR  (NON AFRICAN AMERICAN): >60 ML/MIN/1.73 M^2
GLUCOSE SERPL-MCNC: 98 MG/DL (ref 70–110)
HCT VFR BLD AUTO: 41.1 % (ref 37–48.5)
HGB BLD-MCNC: 13.1 G/DL (ref 12–16)
IMM GRANULOCYTES # BLD AUTO: 0.01 K/UL (ref 0–0.04)
MAGNESIUM SERPL-MCNC: 1.9 MG/DL (ref 1.6–2.6)
MCH RBC QN AUTO: 28.1 PG (ref 27–31)
MCHC RBC AUTO-ENTMCNC: 31.9 G/DL (ref 32–36)
MCV RBC AUTO: 88 FL (ref 82–98)
NEUTROPHILS # BLD AUTO: 2.4 K/UL (ref 1.8–7.7)
PLATELET # BLD AUTO: 260 K/UL (ref 150–450)
PMV BLD AUTO: 9.3 FL (ref 9.2–12.9)
POTASSIUM SERPL-SCNC: 4.2 MMOL/L (ref 3.5–5.1)
PROT SERPL-MCNC: 6.9 G/DL (ref 6–8.4)
RBC # BLD AUTO: 4.66 M/UL (ref 4–5.4)
SODIUM SERPL-SCNC: 140 MMOL/L (ref 136–145)
WBC # BLD AUTO: 4.68 K/UL (ref 3.9–12.7)

## 2021-08-09 PROCEDURE — 3075F PR MOST RECENT SYSTOLIC BLOOD PRESS GE 130-139MM HG: ICD-10-PCS | Mod: CPTII,S$GLB,, | Performed by: INTERNAL MEDICINE

## 2021-08-09 PROCEDURE — 85027 COMPLETE CBC AUTOMATED: CPT | Mod: PN | Performed by: INTERNAL MEDICINE

## 2021-08-09 PROCEDURE — 99214 OFFICE O/P EST MOD 30 MIN: CPT | Mod: S$GLB,,, | Performed by: INTERNAL MEDICINE

## 2021-08-09 PROCEDURE — 3079F DIAST BP 80-89 MM HG: CPT | Mod: CPTII,S$GLB,, | Performed by: INTERNAL MEDICINE

## 2021-08-09 PROCEDURE — 99999 PR PBB SHADOW E&M-EST. PATIENT-LVL IV: ICD-10-PCS | Mod: PBBFAC,,, | Performed by: INTERNAL MEDICINE

## 2021-08-09 PROCEDURE — 99999 PR PBB SHADOW E&M-EST. PATIENT-LVL IV: CPT | Mod: PBBFAC,,, | Performed by: INTERNAL MEDICINE

## 2021-08-09 PROCEDURE — 3008F BODY MASS INDEX DOCD: CPT | Mod: CPTII,S$GLB,, | Performed by: INTERNAL MEDICINE

## 2021-08-09 PROCEDURE — 83735 ASSAY OF MAGNESIUM: CPT | Mod: PN | Performed by: INTERNAL MEDICINE

## 2021-08-09 PROCEDURE — 80053 COMPREHEN METABOLIC PANEL: CPT | Mod: PN | Performed by: INTERNAL MEDICINE

## 2021-08-09 PROCEDURE — 3075F SYST BP GE 130 - 139MM HG: CPT | Mod: CPTII,S$GLB,, | Performed by: INTERNAL MEDICINE

## 2021-08-09 PROCEDURE — 36415 COLL VENOUS BLD VENIPUNCTURE: CPT | Mod: PN | Performed by: INTERNAL MEDICINE

## 2021-08-09 PROCEDURE — 3008F PR BODY MASS INDEX (BMI) DOCUMENTED: ICD-10-PCS | Mod: CPTII,S$GLB,, | Performed by: INTERNAL MEDICINE

## 2021-08-09 PROCEDURE — 99214 PR OFFICE/OUTPT VISIT, EST, LEVL IV, 30-39 MIN: ICD-10-PCS | Mod: S$GLB,,, | Performed by: INTERNAL MEDICINE

## 2021-08-09 PROCEDURE — 1126F AMNT PAIN NOTED NONE PRSNT: CPT | Mod: CPTII,S$GLB,, | Performed by: INTERNAL MEDICINE

## 2021-08-09 PROCEDURE — 1126F PR PAIN SEVERITY QUANTIFIED, NO PAIN PRESENT: ICD-10-PCS | Mod: CPTII,S$GLB,, | Performed by: INTERNAL MEDICINE

## 2021-08-09 PROCEDURE — 3079F PR MOST RECENT DIASTOLIC BLOOD PRESSURE 80-89 MM HG: ICD-10-PCS | Mod: CPTII,S$GLB,, | Performed by: INTERNAL MEDICINE

## 2021-08-09 PROCEDURE — 1159F MED LIST DOCD IN RCRD: CPT | Mod: CPTII,S$GLB,, | Performed by: INTERNAL MEDICINE

## 2021-08-09 PROCEDURE — 1159F PR MEDICATION LIST DOCUMENTED IN MEDICAL RECORD: ICD-10-PCS | Mod: CPTII,S$GLB,, | Performed by: INTERNAL MEDICINE

## 2021-08-10 ENCOUNTER — TELEPHONE (OUTPATIENT)
Dept: HEMATOLOGY/ONCOLOGY | Facility: CLINIC | Age: 51
End: 2021-08-10

## 2021-08-10 ENCOUNTER — TELEPHONE (OUTPATIENT)
Dept: FAMILY MEDICINE | Facility: CLINIC | Age: 51
End: 2021-08-10

## 2021-08-10 DIAGNOSIS — R05.9 COUGH: Primary | ICD-10-CM

## 2021-08-10 RX ORDER — ATROPINE SULFATE 0.4 MG/ML
0.4 INJECTION, SOLUTION ENDOTRACHEAL; INTRAMEDULLARY; INTRAMUSCULAR; INTRAVENOUS; SUBCUTANEOUS ONCE AS NEEDED
Status: CANCELLED | OUTPATIENT
Start: 2021-08-11

## 2021-08-10 RX ORDER — HEPARIN 100 UNIT/ML
500 SYRINGE INTRAVENOUS
Status: CANCELLED | OUTPATIENT
Start: 2021-08-11

## 2021-08-10 RX ORDER — FLUOROURACIL 50 MG/ML
400 INJECTION, SOLUTION INTRAVENOUS
Status: CANCELLED | OUTPATIENT
Start: 2021-08-11

## 2021-08-10 RX ORDER — SODIUM CHLORIDE 0.9 % (FLUSH) 0.9 %
10 SYRINGE (ML) INJECTION
Status: CANCELLED | OUTPATIENT
Start: 2021-08-13

## 2021-08-10 RX ORDER — SODIUM CHLORIDE 0.9 % (FLUSH) 0.9 %
10 SYRINGE (ML) INJECTION
Status: CANCELLED | OUTPATIENT
Start: 2021-08-11

## 2021-08-10 RX ORDER — HEPARIN 100 UNIT/ML
500 SYRINGE INTRAVENOUS
Status: CANCELLED | OUTPATIENT
Start: 2021-08-13

## 2021-08-11 ENCOUNTER — INFUSION (OUTPATIENT)
Dept: INFUSION THERAPY | Facility: HOSPITAL | Age: 51
End: 2021-08-11
Attending: INTERNAL MEDICINE
Payer: COMMERCIAL

## 2021-08-11 ENCOUNTER — DOCUMENTATION ONLY (OUTPATIENT)
Dept: INFUSION THERAPY | Facility: HOSPITAL | Age: 51
End: 2021-08-11

## 2021-08-11 ENCOUNTER — OFFICE VISIT (OUTPATIENT)
Dept: PSYCHIATRY | Facility: CLINIC | Age: 51
End: 2021-08-11
Payer: COMMERCIAL

## 2021-08-11 VITALS
WEIGHT: 288.56 LBS | HEART RATE: 88 BPM | RESPIRATION RATE: 18 BRPM | TEMPERATURE: 98 F | BODY MASS INDEX: 46.38 KG/M2 | SYSTOLIC BLOOD PRESSURE: 142 MMHG | DIASTOLIC BLOOD PRESSURE: 85 MMHG | HEIGHT: 66 IN

## 2021-08-11 DIAGNOSIS — C77.2 METASTASIS TO INTESTINAL LYMPH NODE: Primary | ICD-10-CM

## 2021-08-11 DIAGNOSIS — C18.7 MALIGNANT NEOPLASM OF SIGMOID COLON: ICD-10-CM

## 2021-08-11 DIAGNOSIS — F41.1 ANXIETY ASSOCIATED WITH CANCER DIAGNOSIS: ICD-10-CM

## 2021-08-11 DIAGNOSIS — F33.1 MODERATE EPISODE OF RECURRENT MAJOR DEPRESSIVE DISORDER: Primary | ICD-10-CM

## 2021-08-11 DIAGNOSIS — C80.1 ANXIETY ASSOCIATED WITH CANCER DIAGNOSIS: ICD-10-CM

## 2021-08-11 PROCEDURE — 96417 CHEMO IV INFUS EACH ADDL SEQ: CPT | Mod: PN

## 2021-08-11 PROCEDURE — 90834 PSYTX W PT 45 MINUTES: CPT | Mod: S$GLB,,, | Performed by: PSYCHOLOGIST

## 2021-08-11 PROCEDURE — 25000003 PHARM REV CODE 250: Mod: PN | Performed by: INTERNAL MEDICINE

## 2021-08-11 PROCEDURE — 63600175 PHARM REV CODE 636 W HCPCS: Mod: PN | Performed by: INTERNAL MEDICINE

## 2021-08-11 PROCEDURE — 96416 CHEMO PROLONG INFUSE W/PUMP: CPT | Mod: PN

## 2021-08-11 PROCEDURE — 96413 CHEMO IV INFUSION 1 HR: CPT | Mod: PN

## 2021-08-11 PROCEDURE — 90834 PR PSYCHOTHERAPY W/PATIENT, 45 MIN: ICD-10-PCS | Mod: S$GLB,,, | Performed by: PSYCHOLOGIST

## 2021-08-11 PROCEDURE — 96415 CHEMO IV INFUSION ADDL HR: CPT | Mod: PN

## 2021-08-11 PROCEDURE — 96375 TX/PRO/DX INJ NEW DRUG ADDON: CPT | Mod: PN

## 2021-08-11 PROCEDURE — 96411 CHEMO IV PUSH ADDL DRUG: CPT | Mod: PN

## 2021-08-11 PROCEDURE — 96367 TX/PROPH/DG ADDL SEQ IV INF: CPT | Mod: PN

## 2021-08-11 RX ORDER — SODIUM CHLORIDE 0.9 % (FLUSH) 0.9 %
10 SYRINGE (ML) INJECTION
Status: DISCONTINUED | OUTPATIENT
Start: 2021-08-11 | End: 2021-08-11 | Stop reason: HOSPADM

## 2021-08-11 RX ORDER — FLUOROURACIL 50 MG/ML
400 INJECTION, SOLUTION INTRAVENOUS
Status: COMPLETED | OUTPATIENT
Start: 2021-08-11 | End: 2021-08-11

## 2021-08-11 RX ORDER — HEPARIN 100 UNIT/ML
500 SYRINGE INTRAVENOUS
Status: DISCONTINUED | OUTPATIENT
Start: 2021-08-11 | End: 2021-08-11 | Stop reason: HOSPADM

## 2021-08-11 RX ORDER — ATROPINE SULFATE 0.4 MG/ML
0.4 INJECTION, SOLUTION ENDOTRACHEAL; INTRAMEDULLARY; INTRAMUSCULAR; INTRAVENOUS; SUBCUTANEOUS ONCE AS NEEDED
Status: COMPLETED | OUTPATIENT
Start: 2021-08-11 | End: 2021-08-11

## 2021-08-11 RX ADMIN — FLUOROURACIL 990 MG: 50 INJECTION, SOLUTION INTRAVENOUS at 01:08

## 2021-08-11 RX ADMIN — FLUOROURACIL 5930 MG: 50 INJECTION, SOLUTION INTRAVENOUS at 01:08

## 2021-08-11 RX ADMIN — DEXTROSE 990 MG: 5 SOLUTION INTRAVENOUS at 12:08

## 2021-08-11 RX ADMIN — PALONOSETRON HYDROCHLORIDE 0.25 MG: 0.25 INJECTION, SOLUTION INTRAVENOUS at 11:08

## 2021-08-11 RX ADMIN — SODIUM CHLORIDE: 0.9 INJECTION, SOLUTION INTRAVENOUS at 10:08

## 2021-08-11 RX ADMIN — SODIUM CHLORIDE 440 MG: 0.9 INJECTION, SOLUTION INTRAVENOUS at 12:08

## 2021-08-11 RX ADMIN — BEVACIZUMAB 600 MG: 400 INJECTION, SOLUTION INTRAVENOUS at 10:08

## 2021-08-11 RX ADMIN — ATROPINE SULFATE 0.4 MG: 0.4 INJECTION, SOLUTION INTRAMUSCULAR; INTRAVENOUS; SUBCUTANEOUS at 12:08

## 2021-08-12 ENCOUNTER — DOCUMENTATION ONLY (OUTPATIENT)
Dept: INFUSION THERAPY | Facility: HOSPITAL | Age: 51
End: 2021-08-12

## 2021-08-13 ENCOUNTER — INFUSION (OUTPATIENT)
Dept: INFUSION THERAPY | Facility: HOSPITAL | Age: 51
End: 2021-08-13
Attending: INTERNAL MEDICINE
Payer: COMMERCIAL

## 2021-08-13 VITALS
SYSTOLIC BLOOD PRESSURE: 151 MMHG | TEMPERATURE: 97 F | HEART RATE: 76 BPM | DIASTOLIC BLOOD PRESSURE: 90 MMHG | RESPIRATION RATE: 18 BRPM

## 2021-08-13 DIAGNOSIS — C77.2 METASTASIS TO INTESTINAL LYMPH NODE: Primary | ICD-10-CM

## 2021-08-13 DIAGNOSIS — C18.7 MALIGNANT NEOPLASM OF SIGMOID COLON: ICD-10-CM

## 2021-08-13 PROCEDURE — 63600175 PHARM REV CODE 636 W HCPCS: Mod: PN | Performed by: INTERNAL MEDICINE

## 2021-08-13 PROCEDURE — A4216 STERILE WATER/SALINE, 10 ML: HCPCS | Mod: PN | Performed by: INTERNAL MEDICINE

## 2021-08-13 PROCEDURE — 25000003 PHARM REV CODE 250: Mod: PN | Performed by: INTERNAL MEDICINE

## 2021-08-13 PROCEDURE — 96523 IRRIG DRUG DELIVERY DEVICE: CPT | Mod: PN

## 2021-08-13 RX ORDER — HEPARIN 100 UNIT/ML
500 SYRINGE INTRAVENOUS
Status: DISCONTINUED | OUTPATIENT
Start: 2021-08-13 | End: 2021-08-13 | Stop reason: HOSPADM

## 2021-08-13 RX ORDER — SODIUM CHLORIDE 0.9 % (FLUSH) 0.9 %
10 SYRINGE (ML) INJECTION
Status: DISCONTINUED | OUTPATIENT
Start: 2021-08-13 | End: 2021-08-13 | Stop reason: HOSPADM

## 2021-08-13 RX ADMIN — Medication 10 ML: at 12:08

## 2021-08-13 RX ADMIN — HEPARIN 500 UNITS: 100 SYRINGE at 12:08

## 2021-08-16 ENCOUNTER — PATIENT MESSAGE (OUTPATIENT)
Dept: HEMATOLOGY/ONCOLOGY | Facility: CLINIC | Age: 51
End: 2021-08-16

## 2021-08-19 ENCOUNTER — PATIENT MESSAGE (OUTPATIENT)
Dept: HEMATOLOGY/ONCOLOGY | Facility: CLINIC | Age: 51
End: 2021-08-19

## 2021-08-23 ENCOUNTER — TELEPHONE (OUTPATIENT)
Dept: HEMATOLOGY/ONCOLOGY | Facility: CLINIC | Age: 51
End: 2021-08-23

## 2021-08-24 ENCOUNTER — PATIENT MESSAGE (OUTPATIENT)
Dept: HEMATOLOGY/ONCOLOGY | Facility: CLINIC | Age: 51
End: 2021-08-24

## 2021-08-24 ENCOUNTER — OFFICE VISIT (OUTPATIENT)
Dept: HEMATOLOGY/ONCOLOGY | Facility: CLINIC | Age: 51
End: 2021-08-24
Payer: COMMERCIAL

## 2021-08-24 ENCOUNTER — LAB VISIT (OUTPATIENT)
Dept: LAB | Facility: HOSPITAL | Age: 51
End: 2021-08-24
Attending: INTERNAL MEDICINE
Payer: COMMERCIAL

## 2021-08-24 VITALS
BODY MASS INDEX: 46.28 KG/M2 | OXYGEN SATURATION: 98 % | WEIGHT: 287.94 LBS | TEMPERATURE: 98 F | DIASTOLIC BLOOD PRESSURE: 78 MMHG | SYSTOLIC BLOOD PRESSURE: 120 MMHG | HEART RATE: 76 BPM | HEIGHT: 66 IN

## 2021-08-24 DIAGNOSIS — R09.81 SINUS CONGESTION: ICD-10-CM

## 2021-08-24 DIAGNOSIS — R06.2 WHEEZING: ICD-10-CM

## 2021-08-24 DIAGNOSIS — F32.A DEPRESSION, UNSPECIFIED DEPRESSION TYPE: ICD-10-CM

## 2021-08-24 DIAGNOSIS — C18.7 MALIGNANT NEOPLASM OF SIGMOID COLON: Primary | ICD-10-CM

## 2021-08-24 DIAGNOSIS — E83.52 HYPERCALCEMIA: ICD-10-CM

## 2021-08-24 DIAGNOSIS — C18.7 MALIGNANT NEOPLASM OF SIGMOID COLON: ICD-10-CM

## 2021-08-24 DIAGNOSIS — E03.9 HYPOTHYROIDISM, UNSPECIFIED TYPE: ICD-10-CM

## 2021-08-24 DIAGNOSIS — C77.2 METASTASIS TO INTESTINAL LYMPH NODE: ICD-10-CM

## 2021-08-24 DIAGNOSIS — F41.9 ANXIETY: ICD-10-CM

## 2021-08-24 LAB
ALBUMIN SERPL BCP-MCNC: 4 G/DL (ref 3.5–5.2)
ALP SERPL-CCNC: 149 U/L (ref 55–135)
ALT SERPL W/O P-5'-P-CCNC: 41 U/L (ref 10–44)
ANION GAP SERPL CALC-SCNC: 12 MMOL/L (ref 8–16)
AST SERPL-CCNC: 29 U/L (ref 10–40)
BASOPHILS # BLD AUTO: 0.04 K/UL (ref 0–0.2)
BASOPHILS NFR BLD: 0.6 % (ref 0–1.9)
BILIRUB SERPL-MCNC: 0.4 MG/DL (ref 0.1–1)
BUN SERPL-MCNC: 15 MG/DL (ref 6–20)
CALCIUM SERPL-MCNC: 10.7 MG/DL (ref 8.7–10.5)
CHLORIDE SERPL-SCNC: 106 MMOL/L (ref 95–110)
CO2 SERPL-SCNC: 22 MMOL/L (ref 23–29)
CREAT SERPL-MCNC: 0.9 MG/DL (ref 0.5–1.4)
DIFFERENTIAL METHOD: ABNORMAL
EOSINOPHIL # BLD AUTO: 0.3 K/UL (ref 0–0.5)
EOSINOPHIL NFR BLD: 5.5 % (ref 0–8)
ERYTHROCYTE [DISTWIDTH] IN BLOOD BY AUTOMATED COUNT: 17.8 % (ref 11.5–14.5)
EST. GFR  (AFRICAN AMERICAN): >60 ML/MIN/1.73 M^2
EST. GFR  (NON AFRICAN AMERICAN): >60 ML/MIN/1.73 M^2
GLUCOSE SERPL-MCNC: 107 MG/DL (ref 70–110)
HCT VFR BLD AUTO: 42.2 % (ref 37–48.5)
HGB BLD-MCNC: 13.6 G/DL (ref 12–16)
IMM GRANULOCYTES # BLD AUTO: 0.01 K/UL (ref 0–0.04)
IMM GRANULOCYTES NFR BLD AUTO: 0.2 % (ref 0–0.5)
LYMPHOCYTES # BLD AUTO: 2.1 K/UL (ref 1–4.8)
LYMPHOCYTES NFR BLD: 33.1 % (ref 18–48)
MAGNESIUM SERPL-MCNC: 2.1 MG/DL (ref 1.6–2.6)
MCH RBC QN AUTO: 28.6 PG (ref 27–31)
MCHC RBC AUTO-ENTMCNC: 32.2 G/DL (ref 32–36)
MCV RBC AUTO: 89 FL (ref 82–98)
MONOCYTES # BLD AUTO: 0.7 K/UL (ref 0.3–1)
MONOCYTES NFR BLD: 11.1 % (ref 4–15)
NEUTROPHILS # BLD AUTO: 3.1 K/UL (ref 1.8–7.7)
NEUTROPHILS NFR BLD: 49.5 % (ref 38–73)
NRBC BLD-RTO: 0 /100 WBC
PLATELET # BLD AUTO: 268 K/UL (ref 150–450)
PMV BLD AUTO: 9 FL (ref 9.2–12.9)
POTASSIUM SERPL-SCNC: 4.2 MMOL/L (ref 3.5–5.1)
PROT SERPL-MCNC: 7.4 G/DL (ref 6–8.4)
RBC # BLD AUTO: 4.76 M/UL (ref 4–5.4)
SODIUM SERPL-SCNC: 140 MMOL/L (ref 136–145)
WBC # BLD AUTO: 6.22 K/UL (ref 3.9–12.7)

## 2021-08-24 PROCEDURE — 3008F BODY MASS INDEX DOCD: CPT | Mod: CPTII,S$GLB,, | Performed by: INTERNAL MEDICINE

## 2021-08-24 PROCEDURE — 85025 COMPLETE CBC W/AUTO DIFF WBC: CPT | Mod: PN | Performed by: INTERNAL MEDICINE

## 2021-08-24 PROCEDURE — 99999 PR PBB SHADOW E&M-EST. PATIENT-LVL IV: CPT | Mod: PBBFAC,,, | Performed by: INTERNAL MEDICINE

## 2021-08-24 PROCEDURE — 99214 OFFICE O/P EST MOD 30 MIN: CPT | Mod: S$GLB,,, | Performed by: INTERNAL MEDICINE

## 2021-08-24 PROCEDURE — 36415 COLL VENOUS BLD VENIPUNCTURE: CPT | Mod: PN | Performed by: INTERNAL MEDICINE

## 2021-08-24 PROCEDURE — 3078F DIAST BP <80 MM HG: CPT | Mod: CPTII,S$GLB,, | Performed by: INTERNAL MEDICINE

## 2021-08-24 PROCEDURE — 80053 COMPREHEN METABOLIC PANEL: CPT | Mod: PN | Performed by: INTERNAL MEDICINE

## 2021-08-24 PROCEDURE — 1159F PR MEDICATION LIST DOCUMENTED IN MEDICAL RECORD: ICD-10-PCS | Mod: CPTII,S$GLB,, | Performed by: INTERNAL MEDICINE

## 2021-08-24 PROCEDURE — 99999 PR PBB SHADOW E&M-EST. PATIENT-LVL IV: ICD-10-PCS | Mod: PBBFAC,,, | Performed by: INTERNAL MEDICINE

## 2021-08-24 PROCEDURE — 3008F PR BODY MASS INDEX (BMI) DOCUMENTED: ICD-10-PCS | Mod: CPTII,S$GLB,, | Performed by: INTERNAL MEDICINE

## 2021-08-24 PROCEDURE — 1126F AMNT PAIN NOTED NONE PRSNT: CPT | Mod: CPTII,S$GLB,, | Performed by: INTERNAL MEDICINE

## 2021-08-24 PROCEDURE — 1126F PR PAIN SEVERITY QUANTIFIED, NO PAIN PRESENT: ICD-10-PCS | Mod: CPTII,S$GLB,, | Performed by: INTERNAL MEDICINE

## 2021-08-24 PROCEDURE — 3078F PR MOST RECENT DIASTOLIC BLOOD PRESSURE < 80 MM HG: ICD-10-PCS | Mod: CPTII,S$GLB,, | Performed by: INTERNAL MEDICINE

## 2021-08-24 PROCEDURE — 3074F PR MOST RECENT SYSTOLIC BLOOD PRESSURE < 130 MM HG: ICD-10-PCS | Mod: CPTII,S$GLB,, | Performed by: INTERNAL MEDICINE

## 2021-08-24 PROCEDURE — 99214 PR OFFICE/OUTPT VISIT, EST, LEVL IV, 30-39 MIN: ICD-10-PCS | Mod: S$GLB,,, | Performed by: INTERNAL MEDICINE

## 2021-08-24 PROCEDURE — 3074F SYST BP LT 130 MM HG: CPT | Mod: CPTII,S$GLB,, | Performed by: INTERNAL MEDICINE

## 2021-08-24 PROCEDURE — 83735 ASSAY OF MAGNESIUM: CPT | Mod: PN | Performed by: INTERNAL MEDICINE

## 2021-08-24 PROCEDURE — 1159F MED LIST DOCD IN RCRD: CPT | Mod: CPTII,S$GLB,, | Performed by: INTERNAL MEDICINE

## 2021-08-24 RX ORDER — ATROPINE SULFATE 0.4 MG/ML
0.4 INJECTION, SOLUTION ENDOTRACHEAL; INTRAMEDULLARY; INTRAMUSCULAR; INTRAVENOUS; SUBCUTANEOUS ONCE AS NEEDED
Status: CANCELLED | OUTPATIENT
Start: 2021-08-25

## 2021-08-24 RX ORDER — SODIUM CHLORIDE 0.9 % (FLUSH) 0.9 %
10 SYRINGE (ML) INJECTION
Status: CANCELLED | OUTPATIENT
Start: 2021-08-27

## 2021-08-24 RX ORDER — HEPARIN 100 UNIT/ML
500 SYRINGE INTRAVENOUS
Status: CANCELLED | OUTPATIENT
Start: 2021-08-25

## 2021-08-24 RX ORDER — FLUOROURACIL 50 MG/ML
400 INJECTION, SOLUTION INTRAVENOUS
Status: CANCELLED | OUTPATIENT
Start: 2021-08-25

## 2021-08-24 RX ORDER — HEPARIN 100 UNIT/ML
500 SYRINGE INTRAVENOUS
Status: CANCELLED | OUTPATIENT
Start: 2021-08-27

## 2021-08-24 RX ORDER — SODIUM CHLORIDE 0.9 % (FLUSH) 0.9 %
10 SYRINGE (ML) INJECTION
Status: CANCELLED | OUTPATIENT
Start: 2021-08-25

## 2021-08-25 ENCOUNTER — INFUSION (OUTPATIENT)
Dept: INFUSION THERAPY | Facility: HOSPITAL | Age: 51
End: 2021-08-25
Attending: INTERNAL MEDICINE
Payer: COMMERCIAL

## 2021-08-25 ENCOUNTER — OFFICE VISIT (OUTPATIENT)
Dept: PSYCHIATRY | Facility: CLINIC | Age: 51
End: 2021-08-25
Payer: COMMERCIAL

## 2021-08-25 ENCOUNTER — DOCUMENTATION ONLY (OUTPATIENT)
Dept: INFUSION THERAPY | Facility: HOSPITAL | Age: 51
End: 2021-08-25

## 2021-08-25 VITALS
TEMPERATURE: 98 F | WEIGHT: 286.94 LBS | DIASTOLIC BLOOD PRESSURE: 74 MMHG | RESPIRATION RATE: 18 BRPM | HEART RATE: 71 BPM | SYSTOLIC BLOOD PRESSURE: 119 MMHG | BODY MASS INDEX: 46.11 KG/M2 | HEIGHT: 66 IN

## 2021-08-25 DIAGNOSIS — C18.7 MALIGNANT NEOPLASM OF SIGMOID COLON: Primary | ICD-10-CM

## 2021-08-25 DIAGNOSIS — C77.2 METASTASIS TO INTESTINAL LYMPH NODE: ICD-10-CM

## 2021-08-25 DIAGNOSIS — C80.1 ANXIETY ASSOCIATED WITH CANCER DIAGNOSIS: ICD-10-CM

## 2021-08-25 DIAGNOSIS — F41.1 ANXIETY ASSOCIATED WITH CANCER DIAGNOSIS: ICD-10-CM

## 2021-08-25 DIAGNOSIS — F33.1 MODERATE EPISODE OF RECURRENT MAJOR DEPRESSIVE DISORDER: Primary | ICD-10-CM

## 2021-08-25 LAB
BILIRUB UR QL STRIP: NEGATIVE
CLARITY UR: CLEAR
COLOR UR: YELLOW
GLUCOSE UR QL STRIP: NEGATIVE
HGB UR QL STRIP: NEGATIVE
KETONES UR QL STRIP: NEGATIVE
LEUKOCYTE ESTERASE UR QL STRIP: NEGATIVE
NITRITE UR QL STRIP: NEGATIVE
PH UR STRIP: 7 [PH] (ref 5–8)
PROT UR QL STRIP: NEGATIVE
SP GR UR STRIP: <=1.005 (ref 1–1.03)
URN SPEC COLLECT METH UR: ABNORMAL

## 2021-08-25 PROCEDURE — 96367 TX/PROPH/DG ADDL SEQ IV INF: CPT | Mod: PN

## 2021-08-25 PROCEDURE — 96375 TX/PRO/DX INJ NEW DRUG ADDON: CPT | Mod: PN

## 2021-08-25 PROCEDURE — 96411 CHEMO IV PUSH ADDL DRUG: CPT | Mod: PN

## 2021-08-25 PROCEDURE — 81003 URINALYSIS AUTO W/O SCOPE: CPT | Mod: PN | Performed by: INTERNAL MEDICINE

## 2021-08-25 PROCEDURE — 96415 CHEMO IV INFUSION ADDL HR: CPT | Mod: PN

## 2021-08-25 PROCEDURE — 96417 CHEMO IV INFUS EACH ADDL SEQ: CPT | Mod: PN

## 2021-08-25 PROCEDURE — 90834 PSYTX W PT 45 MINUTES: CPT | Mod: S$GLB,,, | Performed by: PSYCHOLOGIST

## 2021-08-25 PROCEDURE — 25000003 PHARM REV CODE 250: Mod: PN | Performed by: INTERNAL MEDICINE

## 2021-08-25 PROCEDURE — 96416 CHEMO PROLONG INFUSE W/PUMP: CPT | Mod: PN

## 2021-08-25 PROCEDURE — 90834 PR PSYCHOTHERAPY W/PATIENT, 45 MIN: ICD-10-PCS | Mod: S$GLB,,, | Performed by: PSYCHOLOGIST

## 2021-08-25 PROCEDURE — 96413 CHEMO IV INFUSION 1 HR: CPT | Mod: PN

## 2021-08-25 PROCEDURE — 96368 THER/DIAG CONCURRENT INF: CPT | Mod: PN

## 2021-08-25 PROCEDURE — 63600175 PHARM REV CODE 636 W HCPCS: Mod: PN | Performed by: INTERNAL MEDICINE

## 2021-08-25 RX ORDER — ATROPINE SULFATE 0.4 MG/ML
0.4 INJECTION, SOLUTION ENDOTRACHEAL; INTRAMEDULLARY; INTRAMUSCULAR; INTRAVENOUS; SUBCUTANEOUS ONCE AS NEEDED
Status: COMPLETED | OUTPATIENT
Start: 2021-08-25 | End: 2021-08-25

## 2021-08-25 RX ORDER — FLUOROURACIL 50 MG/ML
400 INJECTION, SOLUTION INTRAVENOUS
Status: COMPLETED | OUTPATIENT
Start: 2021-08-25 | End: 2021-08-25

## 2021-08-25 RX ORDER — SODIUM CHLORIDE 0.9 % (FLUSH) 0.9 %
10 SYRINGE (ML) INJECTION
Status: DISCONTINUED | OUTPATIENT
Start: 2021-08-25 | End: 2021-08-25 | Stop reason: HOSPADM

## 2021-08-25 RX ADMIN — FLUOROURACIL 5930 MG: 50 INJECTION, SOLUTION INTRAVENOUS at 02:08

## 2021-08-25 RX ADMIN — PALONOSETRON HYDROCHLORIDE 0.25 MG: 0.25 INJECTION, SOLUTION INTRAVENOUS at 12:08

## 2021-08-25 RX ADMIN — FLUOROURACIL 990 MG: 50 INJECTION, SOLUTION INTRAVENOUS at 02:08

## 2021-08-25 RX ADMIN — SODIUM CHLORIDE 444 MG: 0.9 INJECTION, SOLUTION INTRAVENOUS at 12:08

## 2021-08-25 RX ADMIN — DEXTROSE 990 MG: 5 SOLUTION INTRAVENOUS at 12:08

## 2021-08-25 RX ADMIN — SODIUM CHLORIDE: 0.9 INJECTION, SOLUTION INTRAVENOUS at 11:08

## 2021-08-25 RX ADMIN — ATROPINE SULFATE 0.4 MG: 0.4 INJECTION, SOLUTION INTRAMUSCULAR; INTRAVENOUS; SUBCUTANEOUS at 12:08

## 2021-08-25 RX ADMIN — BEVACIZUMAB 655 MG: 400 INJECTION, SOLUTION INTRAVENOUS at 11:08

## 2021-08-27 ENCOUNTER — INFUSION (OUTPATIENT)
Dept: INFUSION THERAPY | Facility: HOSPITAL | Age: 51
End: 2021-08-27
Attending: INTERNAL MEDICINE
Payer: COMMERCIAL

## 2021-08-27 VITALS
TEMPERATURE: 98 F | DIASTOLIC BLOOD PRESSURE: 94 MMHG | RESPIRATION RATE: 18 BRPM | SYSTOLIC BLOOD PRESSURE: 118 MMHG | HEART RATE: 82 BPM

## 2021-08-27 DIAGNOSIS — C77.2 METASTASIS TO INTESTINAL LYMPH NODE: Primary | ICD-10-CM

## 2021-08-27 DIAGNOSIS — C18.7 MALIGNANT NEOPLASM OF SIGMOID COLON: ICD-10-CM

## 2021-08-27 PROCEDURE — A4216 STERILE WATER/SALINE, 10 ML: HCPCS | Mod: PN | Performed by: INTERNAL MEDICINE

## 2021-08-27 PROCEDURE — 63600175 PHARM REV CODE 636 W HCPCS: Mod: PN | Performed by: INTERNAL MEDICINE

## 2021-08-27 PROCEDURE — 96523 IRRIG DRUG DELIVERY DEVICE: CPT | Mod: PN

## 2021-08-27 PROCEDURE — 25000003 PHARM REV CODE 250: Mod: PN | Performed by: INTERNAL MEDICINE

## 2021-08-27 RX ORDER — HEPARIN 100 UNIT/ML
500 SYRINGE INTRAVENOUS
Status: DISCONTINUED | OUTPATIENT
Start: 2021-08-27 | End: 2021-08-27 | Stop reason: HOSPADM

## 2021-08-27 RX ORDER — SODIUM CHLORIDE 0.9 % (FLUSH) 0.9 %
10 SYRINGE (ML) INJECTION
Status: DISCONTINUED | OUTPATIENT
Start: 2021-08-27 | End: 2021-08-27 | Stop reason: HOSPADM

## 2021-08-27 RX ADMIN — Medication 10 ML: at 01:08

## 2021-08-27 RX ADMIN — HEPARIN 500 UNITS: 100 SYRINGE at 01:08

## 2021-08-30 ENCOUNTER — PATIENT MESSAGE (OUTPATIENT)
Dept: INFUSION THERAPY | Facility: HOSPITAL | Age: 51
End: 2021-08-30

## 2021-09-02 ENCOUNTER — TELEPHONE (OUTPATIENT)
Dept: HEMATOLOGY/ONCOLOGY | Facility: CLINIC | Age: 51
End: 2021-09-02

## 2021-09-02 ENCOUNTER — PATIENT MESSAGE (OUTPATIENT)
Dept: HEMATOLOGY/ONCOLOGY | Facility: CLINIC | Age: 51
End: 2021-09-02

## 2021-09-03 ENCOUNTER — OFFICE VISIT (OUTPATIENT)
Dept: HEMATOLOGY/ONCOLOGY | Facility: CLINIC | Age: 51
End: 2021-09-03
Payer: COMMERCIAL

## 2021-09-03 ENCOUNTER — PATIENT MESSAGE (OUTPATIENT)
Dept: HEMATOLOGY/ONCOLOGY | Facility: CLINIC | Age: 51
End: 2021-09-03

## 2021-09-03 DIAGNOSIS — F41.9 ANXIETY: ICD-10-CM

## 2021-09-03 DIAGNOSIS — K12.31 MUCOSITIS DUE TO CHEMOTHERAPY: ICD-10-CM

## 2021-09-03 DIAGNOSIS — R11.0 NAUSEA: ICD-10-CM

## 2021-09-03 DIAGNOSIS — C77.9 SECONDARY ADENOCARCINOMA OF LYMPH NODE: ICD-10-CM

## 2021-09-03 DIAGNOSIS — C18.7 MALIGNANT NEOPLASM OF SIGMOID COLON: Primary | ICD-10-CM

## 2021-09-03 DIAGNOSIS — K59.09 OTHER CONSTIPATION: ICD-10-CM

## 2021-09-03 PROCEDURE — 1159F MED LIST DOCD IN RCRD: CPT | Mod: CPTII,,, | Performed by: INTERNAL MEDICINE

## 2021-09-03 PROCEDURE — 1159F PR MEDICATION LIST DOCUMENTED IN MEDICAL RECORD: ICD-10-PCS | Mod: CPTII,,, | Performed by: INTERNAL MEDICINE

## 2021-09-03 PROCEDURE — 1160F RVW MEDS BY RX/DR IN RCRD: CPT | Mod: CPTII,,, | Performed by: INTERNAL MEDICINE

## 2021-09-03 PROCEDURE — 99213 PR OFFICE/OUTPT VISIT, EST, LEVL III, 20-29 MIN: ICD-10-PCS | Mod: 95,,, | Performed by: INTERNAL MEDICINE

## 2021-09-03 PROCEDURE — 99213 OFFICE O/P EST LOW 20 MIN: CPT | Mod: 95,,, | Performed by: INTERNAL MEDICINE

## 2021-09-03 PROCEDURE — 1160F PR REVIEW ALL MEDS BY PRESCRIBER/CLIN PHARMACIST DOCUMENTED: ICD-10-PCS | Mod: CPTII,,, | Performed by: INTERNAL MEDICINE

## 2021-09-03 RX ORDER — FLUOROURACIL 50 MG/ML
400 INJECTION, SOLUTION INTRAVENOUS
Status: CANCELLED | OUTPATIENT
Start: 2021-09-08

## 2021-09-03 RX ORDER — ATROPINE SULFATE 0.4 MG/ML
0.4 INJECTION, SOLUTION ENDOTRACHEAL; INTRAMEDULLARY; INTRAMUSCULAR; INTRAVENOUS; SUBCUTANEOUS ONCE AS NEEDED
Status: CANCELLED | OUTPATIENT
Start: 2021-09-08

## 2021-09-03 RX ORDER — SODIUM CHLORIDE 0.9 % (FLUSH) 0.9 %
10 SYRINGE (ML) INJECTION
Status: CANCELLED | OUTPATIENT
Start: 2021-09-08

## 2021-09-03 RX ORDER — SODIUM CHLORIDE 0.9 % (FLUSH) 0.9 %
10 SYRINGE (ML) INJECTION
Status: CANCELLED | OUTPATIENT
Start: 2021-09-10

## 2021-09-03 RX ORDER — HEPARIN 100 UNIT/ML
500 SYRINGE INTRAVENOUS
Status: CANCELLED | OUTPATIENT
Start: 2021-09-10

## 2021-09-03 RX ORDER — HEPARIN 100 UNIT/ML
500 SYRINGE INTRAVENOUS
Status: CANCELLED | OUTPATIENT
Start: 2021-09-08

## 2021-09-07 ENCOUNTER — LAB VISIT (OUTPATIENT)
Dept: LAB | Facility: HOSPITAL | Age: 51
End: 2021-09-07
Attending: INTERNAL MEDICINE
Payer: COMMERCIAL

## 2021-09-07 DIAGNOSIS — C18.7 MALIGNANT NEOPLASM OF SIGMOID COLON: ICD-10-CM

## 2021-09-07 DIAGNOSIS — E83.52 HYPERCALCEMIA: ICD-10-CM

## 2021-09-07 LAB
ALBUMIN SERPL BCP-MCNC: 3.8 G/DL (ref 3.5–5.2)
ALP SERPL-CCNC: 168 U/L (ref 55–135)
ALT SERPL W/O P-5'-P-CCNC: 47 U/L (ref 10–44)
ANION GAP SERPL CALC-SCNC: 9 MMOL/L (ref 8–16)
AST SERPL-CCNC: 34 U/L (ref 10–40)
BASOPHILS # BLD AUTO: 0.04 K/UL (ref 0–0.2)
BASOPHILS NFR BLD: 0.7 % (ref 0–1.9)
BILIRUB SERPL-MCNC: 0.4 MG/DL (ref 0.1–1)
BUN SERPL-MCNC: 14 MG/DL (ref 6–20)
CALCIUM SERPL-MCNC: 11.4 MG/DL (ref 8.7–10.5)
CHLORIDE SERPL-SCNC: 109 MMOL/L (ref 95–110)
CO2 SERPL-SCNC: 23 MMOL/L (ref 23–29)
CREAT SERPL-MCNC: 0.9 MG/DL (ref 0.5–1.4)
DIFFERENTIAL METHOD: ABNORMAL
EOSINOPHIL # BLD AUTO: 0.3 K/UL (ref 0–0.5)
EOSINOPHIL NFR BLD: 5.1 % (ref 0–8)
ERYTHROCYTE [DISTWIDTH] IN BLOOD BY AUTOMATED COUNT: 18.5 % (ref 11.5–14.5)
EST. GFR  (AFRICAN AMERICAN): >60 ML/MIN/1.73 M^2
EST. GFR  (NON AFRICAN AMERICAN): >60 ML/MIN/1.73 M^2
GLUCOSE SERPL-MCNC: 95 MG/DL (ref 70–110)
HCT VFR BLD AUTO: 44.3 % (ref 37–48.5)
HGB BLD-MCNC: 14.1 G/DL (ref 12–16)
IMM GRANULOCYTES # BLD AUTO: 0.01 K/UL (ref 0–0.04)
IMM GRANULOCYTES NFR BLD AUTO: 0.2 % (ref 0–0.5)
LYMPHOCYTES # BLD AUTO: 1.7 K/UL (ref 1–4.8)
LYMPHOCYTES NFR BLD: 31.3 % (ref 18–48)
MAGNESIUM SERPL-MCNC: 2 MG/DL (ref 1.6–2.6)
MCH RBC QN AUTO: 28.5 PG (ref 27–31)
MCHC RBC AUTO-ENTMCNC: 31.8 G/DL (ref 32–36)
MCV RBC AUTO: 90 FL (ref 82–98)
MONOCYTES # BLD AUTO: 0.6 K/UL (ref 0.3–1)
MONOCYTES NFR BLD: 10.9 % (ref 4–15)
NEUTROPHILS # BLD AUTO: 2.8 K/UL (ref 1.8–7.7)
NEUTROPHILS NFR BLD: 51.8 % (ref 38–73)
NRBC BLD-RTO: 0 /100 WBC
PLATELET # BLD AUTO: 275 K/UL (ref 150–450)
PMV BLD AUTO: 9.6 FL (ref 9.2–12.9)
POTASSIUM SERPL-SCNC: 4.2 MMOL/L (ref 3.5–5.1)
PROT SERPL-MCNC: 7.3 G/DL (ref 6–8.4)
RBC # BLD AUTO: 4.95 M/UL (ref 4–5.4)
SODIUM SERPL-SCNC: 141 MMOL/L (ref 136–145)
WBC # BLD AUTO: 5.49 K/UL (ref 3.9–12.7)

## 2021-09-07 PROCEDURE — 80053 COMPREHEN METABOLIC PANEL: CPT | Mod: PN | Performed by: INTERNAL MEDICINE

## 2021-09-07 PROCEDURE — 85025 COMPLETE CBC W/AUTO DIFF WBC: CPT | Mod: PN | Performed by: INTERNAL MEDICINE

## 2021-09-07 PROCEDURE — 83735 ASSAY OF MAGNESIUM: CPT | Mod: PN | Performed by: INTERNAL MEDICINE

## 2021-09-07 PROCEDURE — 36415 COLL VENOUS BLD VENIPUNCTURE: CPT | Mod: PN | Performed by: INTERNAL MEDICINE

## 2021-09-08 ENCOUNTER — INFUSION (OUTPATIENT)
Dept: INFUSION THERAPY | Facility: HOSPITAL | Age: 51
End: 2021-09-08
Attending: INTERNAL MEDICINE
Payer: COMMERCIAL

## 2021-09-08 ENCOUNTER — PATIENT MESSAGE (OUTPATIENT)
Dept: INFUSION THERAPY | Facility: HOSPITAL | Age: 51
End: 2021-09-08

## 2021-09-08 ENCOUNTER — TELEPHONE (OUTPATIENT)
Dept: INFUSION THERAPY | Facility: HOSPITAL | Age: 51
End: 2021-09-08

## 2021-09-08 VITALS
HEIGHT: 66 IN | DIASTOLIC BLOOD PRESSURE: 90 MMHG | WEIGHT: 288.94 LBS | BODY MASS INDEX: 46.44 KG/M2 | HEART RATE: 88 BPM | RESPIRATION RATE: 18 BRPM | TEMPERATURE: 98 F | SYSTOLIC BLOOD PRESSURE: 121 MMHG

## 2021-09-08 DIAGNOSIS — E83.52 HYPERCALCEMIA: ICD-10-CM

## 2021-09-08 DIAGNOSIS — C18.7 MALIGNANT NEOPLASM OF SIGMOID COLON: Primary | ICD-10-CM

## 2021-09-08 DIAGNOSIS — C77.2 METASTASIS TO INTESTINAL LYMPH NODE: ICD-10-CM

## 2021-09-08 PROCEDURE — 96413 CHEMO IV INFUSION 1 HR: CPT | Mod: PN

## 2021-09-08 PROCEDURE — 96416 CHEMO PROLONG INFUSE W/PUMP: CPT | Mod: PN

## 2021-09-08 PROCEDURE — 63600175 PHARM REV CODE 636 W HCPCS: Mod: PN | Performed by: INTERNAL MEDICINE

## 2021-09-08 PROCEDURE — 83970 ASSAY OF PARATHORMONE: CPT | Performed by: INTERNAL MEDICINE

## 2021-09-08 PROCEDURE — 25000003 PHARM REV CODE 250: Mod: PN | Performed by: INTERNAL MEDICINE

## 2021-09-08 PROCEDURE — 96417 CHEMO IV INFUS EACH ADDL SEQ: CPT | Mod: PN

## 2021-09-08 PROCEDURE — 96367 TX/PROPH/DG ADDL SEQ IV INF: CPT | Mod: PN

## 2021-09-08 PROCEDURE — 96415 CHEMO IV INFUSION ADDL HR: CPT | Mod: PN

## 2021-09-08 PROCEDURE — 96368 THER/DIAG CONCURRENT INF: CPT | Mod: PN

## 2021-09-08 PROCEDURE — 96411 CHEMO IV PUSH ADDL DRUG: CPT | Mod: PN

## 2021-09-08 RX ORDER — ATROPINE SULFATE 0.4 MG/ML
0.4 INJECTION, SOLUTION ENDOTRACHEAL; INTRAMEDULLARY; INTRAMUSCULAR; INTRAVENOUS; SUBCUTANEOUS ONCE AS NEEDED
Status: DISCONTINUED | OUTPATIENT
Start: 2021-09-08 | End: 2021-09-08 | Stop reason: HOSPADM

## 2021-09-08 RX ORDER — FLUOROURACIL 50 MG/ML
400 INJECTION, SOLUTION INTRAVENOUS
Status: COMPLETED | OUTPATIENT
Start: 2021-09-08 | End: 2021-09-08

## 2021-09-08 RX ORDER — SODIUM CHLORIDE 0.9 % (FLUSH) 0.9 %
10 SYRINGE (ML) INJECTION
Status: DISCONTINUED | OUTPATIENT
Start: 2021-09-08 | End: 2021-09-08 | Stop reason: HOSPADM

## 2021-09-08 RX ORDER — HEPARIN 100 UNIT/ML
500 SYRINGE INTRAVENOUS
Status: DISCONTINUED | OUTPATIENT
Start: 2021-09-08 | End: 2021-09-08 | Stop reason: HOSPADM

## 2021-09-08 RX ADMIN — FLUOROURACIL 5930 MG: 50 INJECTION, SOLUTION INTRAVENOUS at 01:09

## 2021-09-08 RX ADMIN — PALONOSETRON HYDROCHLORIDE 0.25 MG: 0.25 INJECTION, SOLUTION INTRAVENOUS at 11:09

## 2021-09-08 RX ADMIN — BEVACIZUMAB 600 MG: 400 INJECTION, SOLUTION INTRAVENOUS at 10:09

## 2021-09-08 RX ADMIN — SODIUM CHLORIDE: 0.9 INJECTION, SOLUTION INTRAVENOUS at 10:09

## 2021-09-08 RX ADMIN — FLUOROURACIL 990 MG: 50 INJECTION, SOLUTION INTRAVENOUS at 01:09

## 2021-09-08 RX ADMIN — LEUCOVORIN CALCIUM 990 MG: 350 INJECTION, POWDER, LYOPHILIZED, FOR SOLUTION INTRAMUSCULAR; INTRAVENOUS at 11:09

## 2021-09-08 RX ADMIN — SODIUM CHLORIDE 440 MG: 0.9 INJECTION, SOLUTION INTRAVENOUS at 11:09

## 2021-09-09 ENCOUNTER — DOCUMENTATION ONLY (OUTPATIENT)
Dept: INFUSION THERAPY | Facility: HOSPITAL | Age: 51
End: 2021-09-09

## 2021-09-09 LAB — PTH-INTACT SERPL-MCNC: 340.9 PG/ML (ref 9–77)

## 2021-09-10 ENCOUNTER — INFUSION (OUTPATIENT)
Dept: INFUSION THERAPY | Facility: HOSPITAL | Age: 51
End: 2021-09-10
Attending: INTERNAL MEDICINE
Payer: COMMERCIAL

## 2021-09-10 VITALS
SYSTOLIC BLOOD PRESSURE: 144 MMHG | DIASTOLIC BLOOD PRESSURE: 90 MMHG | BODY MASS INDEX: 46.61 KG/M2 | RESPIRATION RATE: 18 BRPM | WEIGHT: 288.81 LBS | HEART RATE: 85 BPM

## 2021-09-10 DIAGNOSIS — C77.2 METASTASIS TO INTESTINAL LYMPH NODE: Primary | ICD-10-CM

## 2021-09-10 DIAGNOSIS — C18.7 MALIGNANT NEOPLASM OF SIGMOID COLON: ICD-10-CM

## 2021-09-10 PROCEDURE — A4216 STERILE WATER/SALINE, 10 ML: HCPCS | Mod: PN | Performed by: INTERNAL MEDICINE

## 2021-09-10 PROCEDURE — 63600175 PHARM REV CODE 636 W HCPCS: Mod: PN | Performed by: INTERNAL MEDICINE

## 2021-09-10 PROCEDURE — 25000003 PHARM REV CODE 250: Mod: PN | Performed by: INTERNAL MEDICINE

## 2021-09-10 RX ORDER — HEPARIN 100 UNIT/ML
500 SYRINGE INTRAVENOUS
Status: DISCONTINUED | OUTPATIENT
Start: 2021-09-10 | End: 2021-09-10 | Stop reason: HOSPADM

## 2021-09-10 RX ORDER — SODIUM CHLORIDE 0.9 % (FLUSH) 0.9 %
10 SYRINGE (ML) INJECTION
Status: DISCONTINUED | OUTPATIENT
Start: 2021-09-10 | End: 2021-09-10 | Stop reason: HOSPADM

## 2021-09-10 RX ADMIN — HEPARIN 500 UNITS: 100 SYRINGE at 01:09

## 2021-09-10 RX ADMIN — Medication 10 ML: at 01:09

## 2021-09-15 ENCOUNTER — PATIENT MESSAGE (OUTPATIENT)
Dept: HEMATOLOGY/ONCOLOGY | Facility: CLINIC | Age: 51
End: 2021-09-15

## 2021-09-17 DIAGNOSIS — D49.89 NEOPLASM OF ABDOMEN: ICD-10-CM

## 2021-09-20 ENCOUNTER — OFFICE VISIT (OUTPATIENT)
Dept: HEMATOLOGY/ONCOLOGY | Facility: CLINIC | Age: 51
End: 2021-09-20
Payer: COMMERCIAL

## 2021-09-20 ENCOUNTER — LAB VISIT (OUTPATIENT)
Dept: LAB | Facility: HOSPITAL | Age: 51
End: 2021-09-20
Attending: NURSE PRACTITIONER
Payer: COMMERCIAL

## 2021-09-20 VITALS
WEIGHT: 293 LBS | HEIGHT: 66 IN | HEART RATE: 78 BPM | BODY MASS INDEX: 47.09 KG/M2 | TEMPERATURE: 97 F | OXYGEN SATURATION: 96 % | DIASTOLIC BLOOD PRESSURE: 77 MMHG | SYSTOLIC BLOOD PRESSURE: 119 MMHG | RESPIRATION RATE: 18 BRPM

## 2021-09-20 DIAGNOSIS — E83.52 HYPERCALCEMIA: ICD-10-CM

## 2021-09-20 DIAGNOSIS — C18.7 MALIGNANT NEOPLASM OF SIGMOID COLON: ICD-10-CM

## 2021-09-20 DIAGNOSIS — F41.9 ANXIETY: ICD-10-CM

## 2021-09-20 DIAGNOSIS — E04.2 MULTINODULAR GOITER: ICD-10-CM

## 2021-09-20 DIAGNOSIS — K76.0 FATTY LIVER: ICD-10-CM

## 2021-09-20 DIAGNOSIS — C77.2 METASTASIS TO INTESTINAL LYMPH NODE: ICD-10-CM

## 2021-09-20 DIAGNOSIS — E04.1 THYROID NODULE: ICD-10-CM

## 2021-09-20 DIAGNOSIS — C18.7 MALIGNANT NEOPLASM OF SIGMOID COLON: Primary | ICD-10-CM

## 2021-09-20 LAB
ALBUMIN SERPL BCP-MCNC: 3.8 G/DL (ref 3.5–5.2)
ALP SERPL-CCNC: 163 U/L (ref 55–135)
ALT SERPL W/O P-5'-P-CCNC: 58 U/L (ref 10–44)
ANION GAP SERPL CALC-SCNC: 11 MMOL/L (ref 8–16)
AST SERPL-CCNC: 40 U/L (ref 10–40)
BASOPHILS # BLD AUTO: 0.02 K/UL (ref 0–0.2)
BASOPHILS NFR BLD: 0.4 % (ref 0–1.9)
BILIRUB SERPL-MCNC: 0.6 MG/DL (ref 0.1–1)
BUN SERPL-MCNC: 12 MG/DL (ref 6–20)
CALCIUM SERPL-MCNC: 10.8 MG/DL (ref 8.7–10.5)
CHLORIDE SERPL-SCNC: 109 MMOL/L (ref 95–110)
CO2 SERPL-SCNC: 22 MMOL/L (ref 23–29)
CREAT SERPL-MCNC: 0.9 MG/DL (ref 0.5–1.4)
DIFFERENTIAL METHOD: ABNORMAL
EOSINOPHIL # BLD AUTO: 0.2 K/UL (ref 0–0.5)
EOSINOPHIL NFR BLD: 4.5 % (ref 0–8)
ERYTHROCYTE [DISTWIDTH] IN BLOOD BY AUTOMATED COUNT: 19.7 % (ref 11.5–14.5)
EST. GFR  (AFRICAN AMERICAN): >60 ML/MIN/1.73 M^2
EST. GFR  (NON AFRICAN AMERICAN): >60 ML/MIN/1.73 M^2
GLUCOSE SERPL-MCNC: 98 MG/DL (ref 70–110)
HCT VFR BLD AUTO: 42.1 % (ref 37–48.5)
HGB BLD-MCNC: 13.4 G/DL (ref 12–16)
IMM GRANULOCYTES # BLD AUTO: 0.01 K/UL (ref 0–0.04)
IMM GRANULOCYTES NFR BLD AUTO: 0.2 % (ref 0–0.5)
LYMPHOCYTES # BLD AUTO: 1.7 K/UL (ref 1–4.8)
LYMPHOCYTES NFR BLD: 36.3 % (ref 18–48)
MCH RBC QN AUTO: 29.1 PG (ref 27–31)
MCHC RBC AUTO-ENTMCNC: 31.8 G/DL (ref 32–36)
MCV RBC AUTO: 91 FL (ref 82–98)
MONOCYTES # BLD AUTO: 0.7 K/UL (ref 0.3–1)
MONOCYTES NFR BLD: 14 % (ref 4–15)
NEUTROPHILS # BLD AUTO: 2.1 K/UL (ref 1.8–7.7)
NEUTROPHILS NFR BLD: 44.6 % (ref 38–73)
NRBC BLD-RTO: 0 /100 WBC
PLATELET # BLD AUTO: 263 K/UL (ref 150–450)
PMV BLD AUTO: 9.3 FL (ref 9.2–12.9)
POTASSIUM SERPL-SCNC: 4 MMOL/L (ref 3.5–5.1)
PROT SERPL-MCNC: 7 G/DL (ref 6–8.4)
RBC # BLD AUTO: 4.61 M/UL (ref 4–5.4)
SODIUM SERPL-SCNC: 142 MMOL/L (ref 136–145)
WBC # BLD AUTO: 4.71 K/UL (ref 3.9–12.7)

## 2021-09-20 PROCEDURE — 3078F PR MOST RECENT DIASTOLIC BLOOD PRESSURE < 80 MM HG: ICD-10-PCS | Mod: CPTII,S$GLB,, | Performed by: NURSE PRACTITIONER

## 2021-09-20 PROCEDURE — 99999 PR PBB SHADOW E&M-EST. PATIENT-LVL IV: ICD-10-PCS | Mod: PBBFAC,,, | Performed by: NURSE PRACTITIONER

## 2021-09-20 PROCEDURE — 36415 COLL VENOUS BLD VENIPUNCTURE: CPT | Mod: PN | Performed by: INTERNAL MEDICINE

## 2021-09-20 PROCEDURE — 3078F DIAST BP <80 MM HG: CPT | Mod: CPTII,S$GLB,, | Performed by: NURSE PRACTITIONER

## 2021-09-20 PROCEDURE — 3074F PR MOST RECENT SYSTOLIC BLOOD PRESSURE < 130 MM HG: ICD-10-PCS | Mod: CPTII,S$GLB,, | Performed by: NURSE PRACTITIONER

## 2021-09-20 PROCEDURE — 1160F PR REVIEW ALL MEDS BY PRESCRIBER/CLIN PHARMACIST DOCUMENTED: ICD-10-PCS | Mod: CPTII,S$GLB,, | Performed by: NURSE PRACTITIONER

## 2021-09-20 PROCEDURE — 80053 COMPREHEN METABOLIC PANEL: CPT | Mod: PN | Performed by: INTERNAL MEDICINE

## 2021-09-20 PROCEDURE — 99214 OFFICE O/P EST MOD 30 MIN: CPT | Mod: S$GLB,,, | Performed by: NURSE PRACTITIONER

## 2021-09-20 PROCEDURE — 3008F BODY MASS INDEX DOCD: CPT | Mod: CPTII,S$GLB,, | Performed by: NURSE PRACTITIONER

## 2021-09-20 PROCEDURE — 1159F PR MEDICATION LIST DOCUMENTED IN MEDICAL RECORD: ICD-10-PCS | Mod: CPTII,S$GLB,, | Performed by: NURSE PRACTITIONER

## 2021-09-20 PROCEDURE — 3008F PR BODY MASS INDEX (BMI) DOCUMENTED: ICD-10-PCS | Mod: CPTII,S$GLB,, | Performed by: NURSE PRACTITIONER

## 2021-09-20 PROCEDURE — 3074F SYST BP LT 130 MM HG: CPT | Mod: CPTII,S$GLB,, | Performed by: NURSE PRACTITIONER

## 2021-09-20 PROCEDURE — 99214 PR OFFICE/OUTPT VISIT, EST, LEVL IV, 30-39 MIN: ICD-10-PCS | Mod: S$GLB,,, | Performed by: NURSE PRACTITIONER

## 2021-09-20 PROCEDURE — 85025 COMPLETE CBC W/AUTO DIFF WBC: CPT | Mod: PN | Performed by: INTERNAL MEDICINE

## 2021-09-20 PROCEDURE — 1160F RVW MEDS BY RX/DR IN RCRD: CPT | Mod: CPTII,S$GLB,, | Performed by: NURSE PRACTITIONER

## 2021-09-20 PROCEDURE — 99999 PR PBB SHADOW E&M-EST. PATIENT-LVL IV: CPT | Mod: PBBFAC,,, | Performed by: NURSE PRACTITIONER

## 2021-09-20 PROCEDURE — 1159F MED LIST DOCD IN RCRD: CPT | Mod: CPTII,S$GLB,, | Performed by: NURSE PRACTITIONER

## 2021-09-21 ENCOUNTER — PATIENT MESSAGE (OUTPATIENT)
Dept: HEMATOLOGY/ONCOLOGY | Facility: CLINIC | Age: 51
End: 2021-09-21

## 2021-09-22 ENCOUNTER — INFUSION (OUTPATIENT)
Dept: INFUSION THERAPY | Facility: HOSPITAL | Age: 51
End: 2021-09-22
Attending: INTERNAL MEDICINE
Payer: COMMERCIAL

## 2021-09-22 VITALS
HEART RATE: 89 BPM | BODY MASS INDEX: 47.09 KG/M2 | TEMPERATURE: 98 F | HEIGHT: 66 IN | DIASTOLIC BLOOD PRESSURE: 87 MMHG | RESPIRATION RATE: 16 BRPM | SYSTOLIC BLOOD PRESSURE: 138 MMHG | WEIGHT: 293 LBS

## 2021-09-22 DIAGNOSIS — C77.2 METASTASIS TO INTESTINAL LYMPH NODE: Primary | ICD-10-CM

## 2021-09-22 DIAGNOSIS — C18.7 MALIGNANT NEOPLASM OF SIGMOID COLON: ICD-10-CM

## 2021-09-22 PROCEDURE — 96417 CHEMO IV INFUS EACH ADDL SEQ: CPT | Mod: PN

## 2021-09-22 PROCEDURE — 63600175 PHARM REV CODE 636 W HCPCS: Mod: JG,PN | Performed by: NURSE PRACTITIONER

## 2021-09-22 PROCEDURE — 96375 TX/PRO/DX INJ NEW DRUG ADDON: CPT | Mod: PN

## 2021-09-22 PROCEDURE — 63600175 PHARM REV CODE 636 W HCPCS: Mod: PN | Performed by: INTERNAL MEDICINE

## 2021-09-22 PROCEDURE — 25000003 PHARM REV CODE 250: Mod: PN | Performed by: NURSE PRACTITIONER

## 2021-09-22 PROCEDURE — 96367 TX/PROPH/DG ADDL SEQ IV INF: CPT | Mod: PN

## 2021-09-22 PROCEDURE — 96415 CHEMO IV INFUSION ADDL HR: CPT | Mod: PN

## 2021-09-22 PROCEDURE — 96416 CHEMO PROLONG INFUSE W/PUMP: CPT | Mod: PN

## 2021-09-22 PROCEDURE — 96368 THER/DIAG CONCURRENT INF: CPT | Mod: PN

## 2021-09-22 PROCEDURE — 96411 CHEMO IV PUSH ADDL DRUG: CPT | Mod: PN

## 2021-09-22 PROCEDURE — 96413 CHEMO IV INFUSION 1 HR: CPT | Mod: PN

## 2021-09-22 RX ORDER — HEPARIN 100 UNIT/ML
500 SYRINGE INTRAVENOUS
Status: DISCONTINUED | OUTPATIENT
Start: 2021-09-22 | End: 2021-09-22 | Stop reason: HOSPADM

## 2021-09-22 RX ORDER — FLUOROURACIL 50 MG/ML
400 INJECTION, SOLUTION INTRAVENOUS
Status: COMPLETED | OUTPATIENT
Start: 2021-09-22 | End: 2021-09-22

## 2021-09-22 RX ORDER — SODIUM CHLORIDE 0.9 % (FLUSH) 0.9 %
10 SYRINGE (ML) INJECTION
Status: CANCELLED | OUTPATIENT
Start: 2021-09-22

## 2021-09-22 RX ORDER — FLUOROURACIL 50 MG/ML
400 INJECTION, SOLUTION INTRAVENOUS
Status: CANCELLED | OUTPATIENT
Start: 2021-09-22

## 2021-09-22 RX ORDER — ATROPINE SULFATE 0.4 MG/ML
0.4 INJECTION, SOLUTION ENDOTRACHEAL; INTRAMEDULLARY; INTRAMUSCULAR; INTRAVENOUS; SUBCUTANEOUS ONCE AS NEEDED
Status: DISCONTINUED | OUTPATIENT
Start: 2021-09-22 | End: 2021-09-22 | Stop reason: HOSPADM

## 2021-09-22 RX ORDER — ATROPINE SULFATE 0.4 MG/ML
0.4 INJECTION, SOLUTION ENDOTRACHEAL; INTRAMEDULLARY; INTRAMUSCULAR; INTRAVENOUS; SUBCUTANEOUS ONCE AS NEEDED
Status: CANCELLED | OUTPATIENT
Start: 2021-09-22

## 2021-09-22 RX ORDER — SODIUM CHLORIDE 0.9 % (FLUSH) 0.9 %
10 SYRINGE (ML) INJECTION
Status: CANCELLED | OUTPATIENT
Start: 2021-09-24

## 2021-09-22 RX ORDER — HEPARIN 100 UNIT/ML
500 SYRINGE INTRAVENOUS
Status: CANCELLED | OUTPATIENT
Start: 2021-09-22

## 2021-09-22 RX ORDER — LORAZEPAM 2 MG/ML
1 INJECTION INTRAMUSCULAR
Status: COMPLETED | OUTPATIENT
Start: 2021-09-22 | End: 2021-09-22

## 2021-09-22 RX ORDER — SODIUM CHLORIDE 0.9 % (FLUSH) 0.9 %
10 SYRINGE (ML) INJECTION
Status: DISCONTINUED | OUTPATIENT
Start: 2021-09-22 | End: 2021-09-22 | Stop reason: HOSPADM

## 2021-09-22 RX ORDER — HEPARIN 100 UNIT/ML
500 SYRINGE INTRAVENOUS
Status: CANCELLED | OUTPATIENT
Start: 2021-09-24

## 2021-09-22 RX ADMIN — LORAZEPAM 1 MG: 2 INJECTION INTRAMUSCULAR; INTRAVENOUS at 01:09

## 2021-09-22 RX ADMIN — LEUCOVORIN CALCIUM 990 MG: 350 INJECTION, POWDER, LYOPHILIZED, FOR SOLUTION INTRAMUSCULAR; INTRAVENOUS at 11:09

## 2021-09-22 RX ADMIN — FLUOROURACIL 990 MG: 50 INJECTION, SOLUTION INTRAVENOUS at 01:09

## 2021-09-22 RX ADMIN — PALONOSETRON HYDROCHLORIDE 0.25 MG: 0.25 INJECTION, SOLUTION INTRAVENOUS at 11:09

## 2021-09-22 RX ADMIN — SODIUM CHLORIDE: 0.9 INJECTION, SOLUTION INTRAVENOUS at 10:09

## 2021-09-22 RX ADMIN — BEVACIZUMAB 600 MG: 400 INJECTION, SOLUTION INTRAVENOUS at 11:09

## 2021-09-22 RX ADMIN — SODIUM CHLORIDE 440 MG: 0.9 INJECTION, SOLUTION INTRAVENOUS at 12:09

## 2021-09-22 RX ADMIN — FLUOROURACIL 5930 MG: 50 INJECTION, SOLUTION INTRAVENOUS at 01:09

## 2021-09-24 ENCOUNTER — PATIENT MESSAGE (OUTPATIENT)
Dept: HEMATOLOGY/ONCOLOGY | Facility: CLINIC | Age: 51
End: 2021-09-24

## 2021-09-24 ENCOUNTER — INFUSION (OUTPATIENT)
Dept: INFUSION THERAPY | Facility: HOSPITAL | Age: 51
End: 2021-09-24
Attending: INTERNAL MEDICINE
Payer: COMMERCIAL

## 2021-09-24 VITALS
HEART RATE: 89 BPM | DIASTOLIC BLOOD PRESSURE: 92 MMHG | RESPIRATION RATE: 20 BRPM | HEIGHT: 66 IN | BODY MASS INDEX: 47.09 KG/M2 | WEIGHT: 293 LBS | SYSTOLIC BLOOD PRESSURE: 142 MMHG | TEMPERATURE: 98 F

## 2021-09-24 DIAGNOSIS — C18.7 MALIGNANT NEOPLASM OF SIGMOID COLON: ICD-10-CM

## 2021-09-24 DIAGNOSIS — C77.2 METASTASIS TO INTESTINAL LYMPH NODE: Primary | ICD-10-CM

## 2021-09-24 PROCEDURE — 63600175 PHARM REV CODE 636 W HCPCS: Mod: PN | Performed by: NURSE PRACTITIONER

## 2021-09-24 PROCEDURE — 25000003 PHARM REV CODE 250: Mod: PN | Performed by: NURSE PRACTITIONER

## 2021-09-24 PROCEDURE — 96523 IRRIG DRUG DELIVERY DEVICE: CPT | Mod: PN

## 2021-09-24 PROCEDURE — A4216 STERILE WATER/SALINE, 10 ML: HCPCS | Mod: PN | Performed by: NURSE PRACTITIONER

## 2021-09-24 RX ORDER — SODIUM CHLORIDE 0.9 % (FLUSH) 0.9 %
10 SYRINGE (ML) INJECTION
Status: DISCONTINUED | OUTPATIENT
Start: 2021-09-24 | End: 2021-09-24 | Stop reason: HOSPADM

## 2021-09-24 RX ORDER — HEPARIN 100 UNIT/ML
500 SYRINGE INTRAVENOUS
Status: DISCONTINUED | OUTPATIENT
Start: 2021-09-24 | End: 2021-09-24 | Stop reason: HOSPADM

## 2021-09-24 RX ADMIN — HEPARIN 500 UNITS: 100 SYRINGE at 01:09

## 2021-09-24 RX ADMIN — Medication 10 ML: at 01:09

## 2021-09-29 ENCOUNTER — PATIENT MESSAGE (OUTPATIENT)
Dept: HEMATOLOGY/ONCOLOGY | Facility: CLINIC | Age: 51
End: 2021-09-29

## 2021-09-30 ENCOUNTER — HOSPITAL ENCOUNTER (OUTPATIENT)
Dept: RADIOLOGY | Facility: HOSPITAL | Age: 51
Discharge: HOME | End: 2021-09-30
Attending: INTERNAL MEDICINE
Payer: COMMERCIAL

## 2021-09-30 DIAGNOSIS — C18.7 MALIGNANT NEOPLASM OF SIGMOID COLON: ICD-10-CM

## 2021-09-30 LAB — GLUCOSE SERPL-MCNC: 101 MG/DL (ref 70–110)

## 2021-09-30 PROCEDURE — 78815 PET IMAGE W/CT SKULL-THIGH: CPT | Mod: 26,PS,, | Performed by: RADIOLOGY

## 2021-09-30 PROCEDURE — 78815 NM PET CT ROUTINE: ICD-10-PCS | Mod: 26,PS,, | Performed by: RADIOLOGY

## 2021-09-30 PROCEDURE — 78815 PET IMAGE W/CT SKULL-THIGH: CPT | Mod: TC,PN

## 2021-10-04 ENCOUNTER — PATIENT MESSAGE (OUTPATIENT)
Dept: HEMATOLOGY/ONCOLOGY | Facility: CLINIC | Age: 51
End: 2021-10-04

## 2021-10-04 ENCOUNTER — LAB VISIT (OUTPATIENT)
Dept: LAB | Facility: HOSPITAL | Age: 51
End: 2021-10-04
Attending: NURSE PRACTITIONER
Payer: COMMERCIAL

## 2021-10-04 ENCOUNTER — OFFICE VISIT (OUTPATIENT)
Dept: HEMATOLOGY/ONCOLOGY | Facility: CLINIC | Age: 51
End: 2021-10-04
Payer: COMMERCIAL

## 2021-10-04 VITALS
WEIGHT: 293 LBS | HEIGHT: 66 IN | TEMPERATURE: 97 F | RESPIRATION RATE: 18 BRPM | OXYGEN SATURATION: 98 % | BODY MASS INDEX: 47.09 KG/M2 | DIASTOLIC BLOOD PRESSURE: 84 MMHG | SYSTOLIC BLOOD PRESSURE: 124 MMHG | HEART RATE: 84 BPM

## 2021-10-04 DIAGNOSIS — F41.9 ANXIETY: ICD-10-CM

## 2021-10-04 DIAGNOSIS — R11.0 NAUSEA: ICD-10-CM

## 2021-10-04 DIAGNOSIS — C18.7 MALIGNANT NEOPLASM OF SIGMOID COLON: ICD-10-CM

## 2021-10-04 DIAGNOSIS — E04.1 THYROID NODULE: ICD-10-CM

## 2021-10-04 DIAGNOSIS — K59.09 OTHER CONSTIPATION: ICD-10-CM

## 2021-10-04 DIAGNOSIS — E83.52 HYPERCALCEMIA: ICD-10-CM

## 2021-10-04 DIAGNOSIS — C18.7 MALIGNANT NEOPLASM OF SIGMOID COLON: Primary | ICD-10-CM

## 2021-10-04 DIAGNOSIS — C77.9 SECONDARY ADENOCARCINOMA OF LYMPH NODE: ICD-10-CM

## 2021-10-04 DIAGNOSIS — C77.2 METASTASIS TO INTESTINAL LYMPH NODE: ICD-10-CM

## 2021-10-04 LAB
ALBUMIN SERPL BCP-MCNC: 3.7 G/DL (ref 3.5–5.2)
ALP SERPL-CCNC: 147 U/L (ref 55–135)
ALT SERPL W/O P-5'-P-CCNC: 70 U/L (ref 10–44)
ANION GAP SERPL CALC-SCNC: 9 MMOL/L (ref 8–16)
AST SERPL-CCNC: 45 U/L (ref 10–40)
BASOPHILS # BLD AUTO: 0.03 K/UL (ref 0–0.2)
BASOPHILS NFR BLD: 0.6 % (ref 0–1.9)
BILIRUB SERPL-MCNC: 0.4 MG/DL (ref 0.1–1)
BUN SERPL-MCNC: 17 MG/DL (ref 6–20)
CALCIUM SERPL-MCNC: 11 MG/DL (ref 8.7–10.5)
CHLORIDE SERPL-SCNC: 108 MMOL/L (ref 95–110)
CO2 SERPL-SCNC: 25 MMOL/L (ref 23–29)
CREAT SERPL-MCNC: 0.9 MG/DL (ref 0.5–1.4)
DIFFERENTIAL METHOD: ABNORMAL
EOSINOPHIL # BLD AUTO: 0.4 K/UL (ref 0–0.5)
EOSINOPHIL NFR BLD: 6.5 % (ref 0–8)
ERYTHROCYTE [DISTWIDTH] IN BLOOD BY AUTOMATED COUNT: 19.9 % (ref 11.5–14.5)
EST. GFR  (AFRICAN AMERICAN): >60 ML/MIN/1.73 M^2
EST. GFR  (NON AFRICAN AMERICAN): >60 ML/MIN/1.73 M^2
GLUCOSE SERPL-MCNC: 109 MG/DL (ref 70–110)
HCT VFR BLD AUTO: 41.2 % (ref 37–48.5)
HGB BLD-MCNC: 13.3 G/DL (ref 12–16)
IMM GRANULOCYTES # BLD AUTO: 0.02 K/UL (ref 0–0.04)
IMM GRANULOCYTES NFR BLD AUTO: 0.4 % (ref 0–0.5)
LYMPHOCYTES # BLD AUTO: 1.7 K/UL (ref 1–4.8)
LYMPHOCYTES NFR BLD: 32.4 % (ref 18–48)
MCH RBC QN AUTO: 30.3 PG (ref 27–31)
MCHC RBC AUTO-ENTMCNC: 32.3 G/DL (ref 32–36)
MCV RBC AUTO: 94 FL (ref 82–98)
MONOCYTES # BLD AUTO: 0.6 K/UL (ref 0.3–1)
MONOCYTES NFR BLD: 11.2 % (ref 4–15)
NEUTROPHILS # BLD AUTO: 2.6 K/UL (ref 1.8–7.7)
NEUTROPHILS NFR BLD: 48.9 % (ref 38–73)
NRBC BLD-RTO: 0 /100 WBC
PLATELET # BLD AUTO: 252 K/UL (ref 150–450)
PMV BLD AUTO: 9.9 FL (ref 9.2–12.9)
POTASSIUM SERPL-SCNC: 3.9 MMOL/L (ref 3.5–5.1)
PROT SERPL-MCNC: 6.9 G/DL (ref 6–8.4)
RBC # BLD AUTO: 4.39 M/UL (ref 4–5.4)
SODIUM SERPL-SCNC: 142 MMOL/L (ref 136–145)
WBC # BLD AUTO: 5.37 K/UL (ref 3.9–12.7)

## 2021-10-04 PROCEDURE — 3008F BODY MASS INDEX DOCD: CPT | Mod: CPTII,S$GLB,, | Performed by: INTERNAL MEDICINE

## 2021-10-04 PROCEDURE — 3079F PR MOST RECENT DIASTOLIC BLOOD PRESSURE 80-89 MM HG: ICD-10-PCS | Mod: CPTII,S$GLB,, | Performed by: INTERNAL MEDICINE

## 2021-10-04 PROCEDURE — 3008F PR BODY MASS INDEX (BMI) DOCUMENTED: ICD-10-PCS | Mod: CPTII,S$GLB,, | Performed by: INTERNAL MEDICINE

## 2021-10-04 PROCEDURE — 3074F PR MOST RECENT SYSTOLIC BLOOD PRESSURE < 130 MM HG: ICD-10-PCS | Mod: CPTII,S$GLB,, | Performed by: INTERNAL MEDICINE

## 2021-10-04 PROCEDURE — 99999 PR PBB SHADOW E&M-EST. PATIENT-LVL IV: CPT | Mod: PBBFAC,,, | Performed by: INTERNAL MEDICINE

## 2021-10-04 PROCEDURE — 85025 COMPLETE CBC W/AUTO DIFF WBC: CPT | Mod: PN | Performed by: NURSE PRACTITIONER

## 2021-10-04 PROCEDURE — 80053 COMPREHEN METABOLIC PANEL: CPT | Mod: PN | Performed by: NURSE PRACTITIONER

## 2021-10-04 PROCEDURE — 99215 OFFICE O/P EST HI 40 MIN: CPT | Mod: S$GLB,,, | Performed by: INTERNAL MEDICINE

## 2021-10-04 PROCEDURE — 99215 PR OFFICE/OUTPT VISIT, EST, LEVL V, 40-54 MIN: ICD-10-PCS | Mod: S$GLB,,, | Performed by: INTERNAL MEDICINE

## 2021-10-04 PROCEDURE — 3074F SYST BP LT 130 MM HG: CPT | Mod: CPTII,S$GLB,, | Performed by: INTERNAL MEDICINE

## 2021-10-04 PROCEDURE — 3079F DIAST BP 80-89 MM HG: CPT | Mod: CPTII,S$GLB,, | Performed by: INTERNAL MEDICINE

## 2021-10-04 PROCEDURE — 36415 COLL VENOUS BLD VENIPUNCTURE: CPT | Mod: PN | Performed by: NURSE PRACTITIONER

## 2021-10-04 PROCEDURE — 99999 PR PBB SHADOW E&M-EST. PATIENT-LVL IV: ICD-10-PCS | Mod: PBBFAC,,, | Performed by: INTERNAL MEDICINE

## 2021-10-04 RX ORDER — FLUOROURACIL 50 MG/ML
400 INJECTION, SOLUTION INTRAVENOUS
Status: CANCELLED | OUTPATIENT
Start: 2021-10-06

## 2021-10-04 RX ORDER — SODIUM CHLORIDE 0.9 % (FLUSH) 0.9 %
10 SYRINGE (ML) INJECTION
Status: CANCELLED | OUTPATIENT
Start: 2021-10-06

## 2021-10-04 RX ORDER — HEPARIN 100 UNIT/ML
500 SYRINGE INTRAVENOUS
Status: CANCELLED | OUTPATIENT
Start: 2021-10-06

## 2021-10-04 RX ORDER — HEPARIN 100 UNIT/ML
500 SYRINGE INTRAVENOUS
Status: CANCELLED | OUTPATIENT
Start: 2021-10-08

## 2021-10-04 RX ORDER — SODIUM CHLORIDE 0.9 % (FLUSH) 0.9 %
10 SYRINGE (ML) INJECTION
Status: CANCELLED | OUTPATIENT
Start: 2021-10-08

## 2021-10-04 RX ORDER — ATROPINE SULFATE 0.4 MG/ML
0.4 INJECTION, SOLUTION ENDOTRACHEAL; INTRAMEDULLARY; INTRAMUSCULAR; INTRAVENOUS; SUBCUTANEOUS ONCE AS NEEDED
Status: CANCELLED | OUTPATIENT
Start: 2021-10-06

## 2021-10-06 ENCOUNTER — TELEPHONE (OUTPATIENT)
Dept: PHARMACY | Facility: CLINIC | Age: 51
End: 2021-10-06

## 2021-10-06 ENCOUNTER — INFUSION (OUTPATIENT)
Dept: INFUSION THERAPY | Facility: HOSPITAL | Age: 51
End: 2021-10-06
Attending: INTERNAL MEDICINE
Payer: COMMERCIAL

## 2021-10-06 VITALS
SYSTOLIC BLOOD PRESSURE: 149 MMHG | WEIGHT: 293 LBS | BODY MASS INDEX: 47.09 KG/M2 | TEMPERATURE: 98 F | HEIGHT: 66 IN | HEART RATE: 94 BPM | DIASTOLIC BLOOD PRESSURE: 83 MMHG | RESPIRATION RATE: 16 BRPM

## 2021-10-06 DIAGNOSIS — C18.7 MALIGNANT NEOPLASM OF SIGMOID COLON: Primary | ICD-10-CM

## 2021-10-06 DIAGNOSIS — C77.2 METASTASIS TO INTESTINAL LYMPH NODE: ICD-10-CM

## 2021-10-06 LAB
BILIRUB UR QL STRIP: NEGATIVE
CLARITY UR: CLEAR
COLOR UR: YELLOW
GLUCOSE UR QL STRIP: NEGATIVE
HGB UR QL STRIP: NEGATIVE
KETONES UR QL STRIP: NEGATIVE
LEUKOCYTE ESTERASE UR QL STRIP: NEGATIVE
NITRITE UR QL STRIP: NEGATIVE
PH UR STRIP: 6 [PH] (ref 5–8)
PROT UR QL STRIP: NEGATIVE
SP GR UR STRIP: 1.02 (ref 1–1.03)
URN SPEC COLLECT METH UR: NORMAL

## 2021-10-06 PROCEDURE — 96416 CHEMO PROLONG INFUSE W/PUMP: CPT | Mod: PN

## 2021-10-06 PROCEDURE — 81003 URINALYSIS AUTO W/O SCOPE: CPT | Mod: PN | Performed by: NURSE PRACTITIONER

## 2021-10-06 PROCEDURE — 96417 CHEMO IV INFUS EACH ADDL SEQ: CPT | Mod: PN

## 2021-10-06 PROCEDURE — 25000003 PHARM REV CODE 250: Mod: PN | Performed by: INTERNAL MEDICINE

## 2021-10-06 PROCEDURE — 96368 THER/DIAG CONCURRENT INF: CPT | Mod: PN

## 2021-10-06 PROCEDURE — 63600175 PHARM REV CODE 636 W HCPCS: Mod: PN | Performed by: INTERNAL MEDICINE

## 2021-10-06 PROCEDURE — 96367 TX/PROPH/DG ADDL SEQ IV INF: CPT | Mod: PN

## 2021-10-06 PROCEDURE — 96375 TX/PRO/DX INJ NEW DRUG ADDON: CPT | Mod: PN

## 2021-10-06 PROCEDURE — 96411 CHEMO IV PUSH ADDL DRUG: CPT | Mod: PN

## 2021-10-06 PROCEDURE — 96413 CHEMO IV INFUSION 1 HR: CPT | Mod: PN

## 2021-10-06 RX ORDER — ATROPINE SULFATE 0.4 MG/ML
0.4 INJECTION, SOLUTION ENDOTRACHEAL; INTRAMEDULLARY; INTRAMUSCULAR; INTRAVENOUS; SUBCUTANEOUS ONCE AS NEEDED
Status: DISCONTINUED | OUTPATIENT
Start: 2021-10-06 | End: 2021-10-06 | Stop reason: HOSPADM

## 2021-10-06 RX ORDER — FLUOROURACIL 50 MG/ML
400 INJECTION, SOLUTION INTRAVENOUS
Status: COMPLETED | OUTPATIENT
Start: 2021-10-06 | End: 2021-10-06

## 2021-10-06 RX ORDER — SODIUM CHLORIDE 0.9 % (FLUSH) 0.9 %
10 SYRINGE (ML) INJECTION
Status: DISCONTINUED | OUTPATIENT
Start: 2021-10-06 | End: 2021-10-06 | Stop reason: HOSPADM

## 2021-10-06 RX ORDER — LORAZEPAM 2 MG/ML
1 INJECTION INTRAMUSCULAR
Status: COMPLETED | OUTPATIENT
Start: 2021-10-06 | End: 2021-10-06

## 2021-10-06 RX ADMIN — LORAZEPAM 1 MG: 2 INJECTION INTRAMUSCULAR; INTRAVENOUS at 12:10

## 2021-10-06 RX ADMIN — SODIUM CHLORIDE: 0.9 INJECTION, SOLUTION INTRAVENOUS at 11:10

## 2021-10-06 RX ADMIN — PROMETHAZINE HYDROCHLORIDE 12.5 MG: 25 INJECTION INTRAMUSCULAR; INTRAVENOUS at 12:10

## 2021-10-06 RX ADMIN — SODIUM CHLORIDE 440 MG: 0.9 INJECTION, SOLUTION INTRAVENOUS at 12:10

## 2021-10-06 RX ADMIN — LEUCOVORIN CALCIUM 990 MG: 350 INJECTION, POWDER, LYOPHILIZED, FOR SOLUTION INTRAMUSCULAR; INTRAVENOUS at 12:10

## 2021-10-06 RX ADMIN — PALONOSETRON HYDROCHLORIDE 0.25 MG: 0.25 INJECTION, SOLUTION INTRAVENOUS at 12:10

## 2021-10-06 RX ADMIN — FLUOROURACIL 5930 MG: 50 INJECTION, SOLUTION INTRAVENOUS at 02:10

## 2021-10-06 RX ADMIN — FLUOROURACIL 990 MG: 50 INJECTION, SOLUTION INTRAVENOUS at 02:10

## 2021-10-06 RX ADMIN — BEVACIZUMAB 600 MG: 400 INJECTION, SOLUTION INTRAVENOUS at 11:10

## 2021-10-07 ENCOUNTER — PATIENT MESSAGE (OUTPATIENT)
Dept: HEMATOLOGY/ONCOLOGY | Facility: CLINIC | Age: 51
End: 2021-10-07

## 2021-10-07 ENCOUNTER — DOCUMENTATION ONLY (OUTPATIENT)
Dept: INFUSION THERAPY | Facility: HOSPITAL | Age: 51
End: 2021-10-07

## 2021-10-08 ENCOUNTER — INFUSION (OUTPATIENT)
Dept: INFUSION THERAPY | Facility: HOSPITAL | Age: 51
End: 2021-10-08
Attending: INTERNAL MEDICINE
Payer: COMMERCIAL

## 2021-10-08 VITALS — TEMPERATURE: 98 F

## 2021-10-08 DIAGNOSIS — C18.7 MALIGNANT NEOPLASM OF SIGMOID COLON: ICD-10-CM

## 2021-10-08 DIAGNOSIS — C77.2 METASTASIS TO INTESTINAL LYMPH NODE: Primary | ICD-10-CM

## 2021-10-08 PROCEDURE — A4216 STERILE WATER/SALINE, 10 ML: HCPCS | Mod: PN | Performed by: INTERNAL MEDICINE

## 2021-10-08 PROCEDURE — 96523 IRRIG DRUG DELIVERY DEVICE: CPT | Mod: PN

## 2021-10-08 PROCEDURE — 63600175 PHARM REV CODE 636 W HCPCS: Mod: PN | Performed by: INTERNAL MEDICINE

## 2021-10-08 PROCEDURE — 25000003 PHARM REV CODE 250: Mod: PN | Performed by: INTERNAL MEDICINE

## 2021-10-08 RX ORDER — HEPARIN 100 UNIT/ML
500 SYRINGE INTRAVENOUS
Status: DISCONTINUED | OUTPATIENT
Start: 2021-10-08 | End: 2021-10-08 | Stop reason: HOSPADM

## 2021-10-08 RX ORDER — SODIUM CHLORIDE 0.9 % (FLUSH) 0.9 %
10 SYRINGE (ML) INJECTION
Status: DISCONTINUED | OUTPATIENT
Start: 2021-10-08 | End: 2021-10-08 | Stop reason: HOSPADM

## 2021-10-08 RX ADMIN — HEPARIN 500 UNITS: 100 SYRINGE at 12:10

## 2021-10-08 RX ADMIN — Medication 10 ML: at 12:10

## 2021-10-18 ENCOUNTER — PATIENT MESSAGE (OUTPATIENT)
Dept: HEMATOLOGY/ONCOLOGY | Facility: CLINIC | Age: 51
End: 2021-10-18

## 2021-10-18 ENCOUNTER — OFFICE VISIT (OUTPATIENT)
Dept: HEMATOLOGY/ONCOLOGY | Facility: CLINIC | Age: 51
End: 2021-10-18
Payer: COMMERCIAL

## 2021-10-18 ENCOUNTER — LAB VISIT (OUTPATIENT)
Dept: LAB | Facility: HOSPITAL | Age: 51
End: 2021-10-18
Attending: INTERNAL MEDICINE
Payer: COMMERCIAL

## 2021-10-18 VITALS
DIASTOLIC BLOOD PRESSURE: 88 MMHG | OXYGEN SATURATION: 96 % | HEART RATE: 73 BPM | BODY MASS INDEX: 47.09 KG/M2 | TEMPERATURE: 97 F | HEIGHT: 66 IN | SYSTOLIC BLOOD PRESSURE: 134 MMHG | WEIGHT: 293 LBS | RESPIRATION RATE: 18 BRPM

## 2021-10-18 DIAGNOSIS — C77.2 METASTASIS TO INTESTINAL LYMPH NODE: ICD-10-CM

## 2021-10-18 DIAGNOSIS — C18.7 MALIGNANT NEOPLASM OF SIGMOID COLON: Primary | ICD-10-CM

## 2021-10-18 DIAGNOSIS — E83.52 HYPERCALCEMIA: ICD-10-CM

## 2021-10-18 DIAGNOSIS — C77.9 SECONDARY ADENOCARCINOMA OF LYMPH NODE: ICD-10-CM

## 2021-10-18 DIAGNOSIS — F41.9 ANXIETY: ICD-10-CM

## 2021-10-18 DIAGNOSIS — R11.0 NAUSEA: ICD-10-CM

## 2021-10-18 DIAGNOSIS — K76.0 FATTY LIVER: ICD-10-CM

## 2021-10-18 DIAGNOSIS — K59.09 OTHER CONSTIPATION: ICD-10-CM

## 2021-10-18 LAB
ALBUMIN SERPL BCP-MCNC: 3.9 G/DL (ref 3.5–5.2)
ALP SERPL-CCNC: 140 U/L (ref 55–135)
ALT SERPL W/O P-5'-P-CCNC: 56 U/L (ref 10–44)
ANION GAP SERPL CALC-SCNC: 10 MMOL/L (ref 8–16)
AST SERPL-CCNC: 39 U/L (ref 10–40)
BASOPHILS # BLD AUTO: 0.03 K/UL (ref 0–0.2)
BASOPHILS NFR BLD: 0.7 % (ref 0–1.9)
BILIRUB SERPL-MCNC: 0.5 MG/DL (ref 0.1–1)
BUN SERPL-MCNC: 11 MG/DL (ref 6–20)
CALCIUM SERPL-MCNC: 11 MG/DL (ref 8.7–10.5)
CEA SERPL-MCNC: 2.3 NG/ML (ref 0–5)
CHLORIDE SERPL-SCNC: 108 MMOL/L (ref 95–110)
CO2 SERPL-SCNC: 24 MMOL/L (ref 23–29)
CREAT SERPL-MCNC: 0.9 MG/DL (ref 0.5–1.4)
DIFFERENTIAL METHOD: ABNORMAL
EOSINOPHIL # BLD AUTO: 0.3 K/UL (ref 0–0.5)
EOSINOPHIL NFR BLD: 7.2 % (ref 0–8)
ERYTHROCYTE [DISTWIDTH] IN BLOOD BY AUTOMATED COUNT: 19.8 % (ref 11.5–14.5)
EST. GFR  (AFRICAN AMERICAN): >60 ML/MIN/1.73 M^2
EST. GFR  (NON AFRICAN AMERICAN): >60 ML/MIN/1.73 M^2
GLUCOSE SERPL-MCNC: 97 MG/DL (ref 70–110)
HCT VFR BLD AUTO: 42.5 % (ref 37–48.5)
HGB BLD-MCNC: 13.6 G/DL (ref 12–16)
IMM GRANULOCYTES # BLD AUTO: 0.01 K/UL (ref 0–0.04)
IMM GRANULOCYTES NFR BLD AUTO: 0.2 % (ref 0–0.5)
LYMPHOCYTES # BLD AUTO: 1.7 K/UL (ref 1–4.8)
LYMPHOCYTES NFR BLD: 36.5 % (ref 18–48)
MCH RBC QN AUTO: 30.8 PG (ref 27–31)
MCHC RBC AUTO-ENTMCNC: 32 G/DL (ref 32–36)
MCV RBC AUTO: 96 FL (ref 82–98)
MONOCYTES # BLD AUTO: 0.6 K/UL (ref 0.3–1)
MONOCYTES NFR BLD: 13.3 % (ref 4–15)
NEUTROPHILS # BLD AUTO: 1.9 K/UL (ref 1.8–7.7)
NEUTROPHILS NFR BLD: 42.1 % (ref 38–73)
NRBC BLD-RTO: 0 /100 WBC
PLATELET # BLD AUTO: 276 K/UL (ref 150–450)
PMV BLD AUTO: 9.6 FL (ref 9.2–12.9)
POTASSIUM SERPL-SCNC: 4.1 MMOL/L (ref 3.5–5.1)
PROT SERPL-MCNC: 7 G/DL (ref 6–8.4)
RBC # BLD AUTO: 4.42 M/UL (ref 4–5.4)
SODIUM SERPL-SCNC: 142 MMOL/L (ref 136–145)
WBC # BLD AUTO: 4.58 K/UL (ref 3.9–12.7)

## 2021-10-18 PROCEDURE — 36415 COLL VENOUS BLD VENIPUNCTURE: CPT | Mod: PN | Performed by: INTERNAL MEDICINE

## 2021-10-18 PROCEDURE — 99215 PR OFFICE/OUTPT VISIT, EST, LEVL V, 40-54 MIN: ICD-10-PCS | Mod: S$GLB,,, | Performed by: INTERNAL MEDICINE

## 2021-10-18 PROCEDURE — 3075F SYST BP GE 130 - 139MM HG: CPT | Mod: CPTII,S$GLB,, | Performed by: INTERNAL MEDICINE

## 2021-10-18 PROCEDURE — 3008F PR BODY MASS INDEX (BMI) DOCUMENTED: ICD-10-PCS | Mod: CPTII,S$GLB,, | Performed by: INTERNAL MEDICINE

## 2021-10-18 PROCEDURE — 99999 PR PBB SHADOW E&M-EST. PATIENT-LVL IV: ICD-10-PCS | Mod: PBBFAC,,, | Performed by: INTERNAL MEDICINE

## 2021-10-18 PROCEDURE — 3075F PR MOST RECENT SYSTOLIC BLOOD PRESS GE 130-139MM HG: ICD-10-PCS | Mod: CPTII,S$GLB,, | Performed by: INTERNAL MEDICINE

## 2021-10-18 PROCEDURE — 3008F BODY MASS INDEX DOCD: CPT | Mod: CPTII,S$GLB,, | Performed by: INTERNAL MEDICINE

## 2021-10-18 PROCEDURE — 82378 CARCINOEMBRYONIC ANTIGEN: CPT | Performed by: INTERNAL MEDICINE

## 2021-10-18 PROCEDURE — 99215 OFFICE O/P EST HI 40 MIN: CPT | Mod: S$GLB,,, | Performed by: INTERNAL MEDICINE

## 2021-10-18 PROCEDURE — 3079F PR MOST RECENT DIASTOLIC BLOOD PRESSURE 80-89 MM HG: ICD-10-PCS | Mod: CPTII,S$GLB,, | Performed by: INTERNAL MEDICINE

## 2021-10-18 PROCEDURE — 99999 PR PBB SHADOW E&M-EST. PATIENT-LVL IV: CPT | Mod: PBBFAC,,, | Performed by: INTERNAL MEDICINE

## 2021-10-18 PROCEDURE — 80053 COMPREHEN METABOLIC PANEL: CPT | Mod: PN | Performed by: INTERNAL MEDICINE

## 2021-10-18 PROCEDURE — 85025 COMPLETE CBC W/AUTO DIFF WBC: CPT | Mod: PN | Performed by: INTERNAL MEDICINE

## 2021-10-18 PROCEDURE — 3079F DIAST BP 80-89 MM HG: CPT | Mod: CPTII,S$GLB,, | Performed by: INTERNAL MEDICINE

## 2021-10-18 RX ORDER — LORAZEPAM 2 MG/ML
1 INJECTION INTRAMUSCULAR
Status: CANCELLED
Start: 2021-10-20 | End: 2021-10-20

## 2021-10-18 RX ORDER — ATROPINE SULFATE 0.4 MG/ML
0.4 INJECTION, SOLUTION ENDOTRACHEAL; INTRAMEDULLARY; INTRAMUSCULAR; INTRAVENOUS; SUBCUTANEOUS ONCE AS NEEDED
Status: CANCELLED | OUTPATIENT
Start: 2021-10-20

## 2021-10-18 RX ORDER — FLUOROURACIL 50 MG/ML
400 INJECTION, SOLUTION INTRAVENOUS
Status: CANCELLED | OUTPATIENT
Start: 2021-10-20

## 2021-10-18 RX ORDER — HEPARIN 100 UNIT/ML
500 SYRINGE INTRAVENOUS
Status: CANCELLED | OUTPATIENT
Start: 2021-10-22

## 2021-10-18 RX ORDER — HEPARIN 100 UNIT/ML
500 SYRINGE INTRAVENOUS
Status: CANCELLED | OUTPATIENT
Start: 2021-10-20

## 2021-10-18 RX ORDER — SODIUM CHLORIDE 0.9 % (FLUSH) 0.9 %
10 SYRINGE (ML) INJECTION
Status: CANCELLED | OUTPATIENT
Start: 2021-10-20

## 2021-10-18 RX ORDER — SODIUM CHLORIDE 0.9 % (FLUSH) 0.9 %
10 SYRINGE (ML) INJECTION
Status: CANCELLED | OUTPATIENT
Start: 2021-10-22

## 2021-10-19 ENCOUNTER — PATIENT MESSAGE (OUTPATIENT)
Dept: HEMATOLOGY/ONCOLOGY | Facility: CLINIC | Age: 51
End: 2021-10-19
Payer: COMMERCIAL

## 2021-10-19 ENCOUNTER — PATIENT MESSAGE (OUTPATIENT)
Dept: PSYCHIATRY | Facility: CLINIC | Age: 51
End: 2021-10-19
Payer: COMMERCIAL

## 2021-10-19 DIAGNOSIS — C18.7 MALIGNANT NEOPLASM OF SIGMOID COLON: ICD-10-CM

## 2021-10-20 ENCOUNTER — DOCUMENTATION ONLY (OUTPATIENT)
Dept: INFUSION THERAPY | Facility: HOSPITAL | Age: 51
End: 2021-10-20

## 2021-10-20 ENCOUNTER — INFUSION (OUTPATIENT)
Dept: INFUSION THERAPY | Facility: HOSPITAL | Age: 51
End: 2021-10-20
Attending: INTERNAL MEDICINE
Payer: COMMERCIAL

## 2021-10-20 VITALS
HEART RATE: 81 BPM | TEMPERATURE: 98 F | BODY MASS INDEX: 46.79 KG/M2 | WEIGHT: 291.13 LBS | RESPIRATION RATE: 18 BRPM | HEIGHT: 66 IN | SYSTOLIC BLOOD PRESSURE: 132 MMHG | DIASTOLIC BLOOD PRESSURE: 72 MMHG

## 2021-10-20 DIAGNOSIS — C18.7 MALIGNANT NEOPLASM OF SIGMOID COLON: ICD-10-CM

## 2021-10-20 DIAGNOSIS — C77.2 METASTASIS TO INTESTINAL LYMPH NODE: Primary | ICD-10-CM

## 2021-10-20 PROCEDURE — 63600175 PHARM REV CODE 636 W HCPCS: Mod: PN | Performed by: INTERNAL MEDICINE

## 2021-10-20 PROCEDURE — 96368 THER/DIAG CONCURRENT INF: CPT | Mod: PN

## 2021-10-20 PROCEDURE — 96417 CHEMO IV INFUS EACH ADDL SEQ: CPT | Mod: PN

## 2021-10-20 PROCEDURE — 96411 CHEMO IV PUSH ADDL DRUG: CPT | Mod: PN

## 2021-10-20 PROCEDURE — 96415 CHEMO IV INFUSION ADDL HR: CPT | Mod: PN

## 2021-10-20 PROCEDURE — 96367 TX/PROPH/DG ADDL SEQ IV INF: CPT | Mod: PN

## 2021-10-20 PROCEDURE — 96416 CHEMO PROLONG INFUSE W/PUMP: CPT | Mod: PN

## 2021-10-20 PROCEDURE — 96375 TX/PRO/DX INJ NEW DRUG ADDON: CPT | Mod: PN

## 2021-10-20 PROCEDURE — 96413 CHEMO IV INFUSION 1 HR: CPT | Mod: PN

## 2021-10-20 PROCEDURE — 25000003 PHARM REV CODE 250: Mod: PN | Performed by: INTERNAL MEDICINE

## 2021-10-20 RX ORDER — FLUOROURACIL 50 MG/ML
400 INJECTION, SOLUTION INTRAVENOUS
Status: COMPLETED | OUTPATIENT
Start: 2021-10-20 | End: 2021-10-20

## 2021-10-20 RX ORDER — SODIUM CHLORIDE 0.9 % (FLUSH) 0.9 %
10 SYRINGE (ML) INJECTION
Status: DISCONTINUED | OUTPATIENT
Start: 2021-10-20 | End: 2021-10-20 | Stop reason: HOSPADM

## 2021-10-20 RX ORDER — LORAZEPAM 2 MG/ML
1 INJECTION INTRAMUSCULAR
Status: COMPLETED | OUTPATIENT
Start: 2021-10-20 | End: 2021-10-20

## 2021-10-20 RX ORDER — LACTULOSE 10 G/15ML
20 SOLUTION ORAL; RECTAL DAILY PRN
Qty: 500 ML | Refills: 2 | Status: ON HOLD | OUTPATIENT
Start: 2021-10-20 | End: 2023-05-12 | Stop reason: HOSPADM

## 2021-10-20 RX ADMIN — FLUOROURACIL 990 MG: 50 INJECTION, SOLUTION INTRAVENOUS at 04:10

## 2021-10-20 RX ADMIN — FLUOROURACIL 5930 MG: 50 INJECTION, SOLUTION INTRAVENOUS at 04:10

## 2021-10-20 RX ADMIN — BEVACIZUMAB 600 MG: 400 INJECTION, SOLUTION INTRAVENOUS at 01:10

## 2021-10-20 RX ADMIN — LORAZEPAM 1 MG: 2 INJECTION INTRAMUSCULAR; INTRAVENOUS at 02:10

## 2021-10-20 RX ADMIN — SODIUM CHLORIDE: 0.9 INJECTION, SOLUTION INTRAVENOUS at 01:10

## 2021-10-20 RX ADMIN — PALONOSETRON 0.25 MG: 0.05 INJECTION, SOLUTION INTRAVENOUS at 01:10

## 2021-10-20 RX ADMIN — PROMETHAZINE HYDROCHLORIDE 6.25 MG: 25 INJECTION INTRAMUSCULAR; INTRAVENOUS at 02:10

## 2021-10-20 RX ADMIN — SODIUM CHLORIDE 440 MG: 0.9 INJECTION, SOLUTION INTRAVENOUS at 02:10

## 2021-10-20 RX ADMIN — LEUCOVORIN CALCIUM 990 MG: 200 INJECTION, POWDER, LYOPHILIZED, FOR SOLUTION INTRAMUSCULAR; INTRAVENOUS at 02:10

## 2021-10-22 ENCOUNTER — INFUSION (OUTPATIENT)
Dept: INFUSION THERAPY | Facility: HOSPITAL | Age: 51
End: 2021-10-22
Attending: INTERNAL MEDICINE
Payer: COMMERCIAL

## 2021-10-22 VITALS
HEART RATE: 77 BPM | DIASTOLIC BLOOD PRESSURE: 81 MMHG | SYSTOLIC BLOOD PRESSURE: 129 MMHG | TEMPERATURE: 98 F | RESPIRATION RATE: 18 BRPM

## 2021-10-22 DIAGNOSIS — C77.2 METASTASIS TO INTESTINAL LYMPH NODE: Primary | ICD-10-CM

## 2021-10-22 DIAGNOSIS — C18.7 MALIGNANT NEOPLASM OF SIGMOID COLON: ICD-10-CM

## 2021-10-22 PROCEDURE — 25000003 PHARM REV CODE 250: Mod: PN | Performed by: INTERNAL MEDICINE

## 2021-10-22 PROCEDURE — 96523 IRRIG DRUG DELIVERY DEVICE: CPT | Mod: PN

## 2021-10-22 PROCEDURE — 63600175 PHARM REV CODE 636 W HCPCS: Mod: PN | Performed by: INTERNAL MEDICINE

## 2021-10-22 PROCEDURE — A4216 STERILE WATER/SALINE, 10 ML: HCPCS | Mod: PN | Performed by: INTERNAL MEDICINE

## 2021-10-22 RX ORDER — SODIUM CHLORIDE 0.9 % (FLUSH) 0.9 %
10 SYRINGE (ML) INJECTION
Status: DISCONTINUED | OUTPATIENT
Start: 2021-10-22 | End: 2021-10-22 | Stop reason: HOSPADM

## 2021-10-22 RX ORDER — HEPARIN 100 UNIT/ML
500 SYRINGE INTRAVENOUS
Status: DISCONTINUED | OUTPATIENT
Start: 2021-10-22 | End: 2021-10-22 | Stop reason: HOSPADM

## 2021-10-22 RX ADMIN — Medication 10 ML: at 02:10

## 2021-10-22 RX ADMIN — HEPARIN 500 UNITS: 100 SYRINGE at 02:10

## 2021-10-28 ENCOUNTER — PATIENT MESSAGE (OUTPATIENT)
Dept: HEMATOLOGY/ONCOLOGY | Facility: CLINIC | Age: 51
End: 2021-10-28
Payer: COMMERCIAL

## 2021-10-29 ENCOUNTER — TELEPHONE (OUTPATIENT)
Dept: HEMATOLOGY/ONCOLOGY | Facility: CLINIC | Age: 51
End: 2021-10-29
Payer: COMMERCIAL

## 2021-11-01 ENCOUNTER — LAB VISIT (OUTPATIENT)
Dept: LAB | Facility: HOSPITAL | Age: 51
End: 2021-11-01
Attending: INTERNAL MEDICINE
Payer: COMMERCIAL

## 2021-11-01 ENCOUNTER — OFFICE VISIT (OUTPATIENT)
Dept: HEMATOLOGY/ONCOLOGY | Facility: CLINIC | Age: 51
End: 2021-11-01
Payer: COMMERCIAL

## 2021-11-01 VITALS
OXYGEN SATURATION: 98 % | SYSTOLIC BLOOD PRESSURE: 102 MMHG | WEIGHT: 293 LBS | DIASTOLIC BLOOD PRESSURE: 80 MMHG | HEART RATE: 83 BPM | HEIGHT: 66 IN | TEMPERATURE: 98 F | BODY MASS INDEX: 47.09 KG/M2 | RESPIRATION RATE: 18 BRPM

## 2021-11-01 DIAGNOSIS — R11.0 NAUSEA: ICD-10-CM

## 2021-11-01 DIAGNOSIS — C18.7 MALIGNANT NEOPLASM OF SIGMOID COLON: Primary | ICD-10-CM

## 2021-11-01 DIAGNOSIS — C18.7 MALIGNANT NEOPLASM OF SIGMOID COLON: ICD-10-CM

## 2021-11-01 DIAGNOSIS — F41.9 ANXIETY: ICD-10-CM

## 2021-11-01 DIAGNOSIS — K12.31 MUCOSITIS DUE TO CHEMOTHERAPY: ICD-10-CM

## 2021-11-01 DIAGNOSIS — C77.9 SECONDARY ADENOCARCINOMA OF LYMPH NODE: ICD-10-CM

## 2021-11-01 DIAGNOSIS — C77.2 METASTASIS TO INTESTINAL LYMPH NODE: ICD-10-CM

## 2021-11-01 DIAGNOSIS — K59.00 CONSTIPATION, UNSPECIFIED CONSTIPATION TYPE: ICD-10-CM

## 2021-11-01 LAB
ALBUMIN SERPL BCP-MCNC: 3.8 G/DL (ref 3.5–5.2)
ALP SERPL-CCNC: 147 U/L (ref 55–135)
ALT SERPL W/O P-5'-P-CCNC: 70 U/L (ref 10–44)
ANION GAP SERPL CALC-SCNC: 9 MMOL/L (ref 8–16)
AST SERPL-CCNC: 50 U/L (ref 10–40)
BASOPHILS # BLD AUTO: 0.02 K/UL (ref 0–0.2)
BASOPHILS NFR BLD: 0.5 % (ref 0–1.9)
BILIRUB SERPL-MCNC: 0.6 MG/DL (ref 0.1–1)
BUN SERPL-MCNC: 10 MG/DL (ref 6–20)
CALCIUM SERPL-MCNC: 10.9 MG/DL (ref 8.7–10.5)
CHLORIDE SERPL-SCNC: 110 MMOL/L (ref 95–110)
CO2 SERPL-SCNC: 24 MMOL/L (ref 23–29)
CREAT SERPL-MCNC: 1 MG/DL (ref 0.5–1.4)
DIFFERENTIAL METHOD: ABNORMAL
EOSINOPHIL # BLD AUTO: 0.3 K/UL (ref 0–0.5)
EOSINOPHIL NFR BLD: 6.6 % (ref 0–8)
ERYTHROCYTE [DISTWIDTH] IN BLOOD BY AUTOMATED COUNT: 18.1 % (ref 11.5–14.5)
EST. GFR  (AFRICAN AMERICAN): >60 ML/MIN/1.73 M^2
EST. GFR  (NON AFRICAN AMERICAN): >60 ML/MIN/1.73 M^2
GLUCOSE SERPL-MCNC: 109 MG/DL (ref 70–110)
HCT VFR BLD AUTO: 42.6 % (ref 37–48.5)
HGB BLD-MCNC: 13.7 G/DL (ref 12–16)
IMM GRANULOCYTES # BLD AUTO: 0.01 K/UL (ref 0–0.04)
IMM GRANULOCYTES NFR BLD AUTO: 0.2 % (ref 0–0.5)
LYMPHOCYTES # BLD AUTO: 1.7 K/UL (ref 1–4.8)
LYMPHOCYTES NFR BLD: 38.2 % (ref 18–48)
MAGNESIUM SERPL-MCNC: 2.2 MG/DL (ref 1.6–2.6)
MCH RBC QN AUTO: 30.9 PG (ref 27–31)
MCHC RBC AUTO-ENTMCNC: 32.2 G/DL (ref 32–36)
MCV RBC AUTO: 96 FL (ref 82–98)
MONOCYTES # BLD AUTO: 0.6 K/UL (ref 0.3–1)
MONOCYTES NFR BLD: 13.9 % (ref 4–15)
NEUTROPHILS # BLD AUTO: 1.8 K/UL (ref 1.8–7.7)
NEUTROPHILS NFR BLD: 40.6 % (ref 38–73)
NRBC BLD-RTO: 0 /100 WBC
PLATELET # BLD AUTO: 259 K/UL (ref 150–450)
PMV BLD AUTO: 9.5 FL (ref 9.2–12.9)
POTASSIUM SERPL-SCNC: 4.1 MMOL/L (ref 3.5–5.1)
PROT SERPL-MCNC: 7 G/DL (ref 6–8.4)
RBC # BLD AUTO: 4.43 M/UL (ref 4–5.4)
SODIUM SERPL-SCNC: 143 MMOL/L (ref 136–145)
WBC # BLD AUTO: 4.4 K/UL (ref 3.9–12.7)

## 2021-11-01 PROCEDURE — 3079F PR MOST RECENT DIASTOLIC BLOOD PRESSURE 80-89 MM HG: ICD-10-PCS | Mod: CPTII,S$GLB,, | Performed by: INTERNAL MEDICINE

## 2021-11-01 PROCEDURE — 99215 OFFICE O/P EST HI 40 MIN: CPT | Mod: S$GLB,,, | Performed by: INTERNAL MEDICINE

## 2021-11-01 PROCEDURE — 3008F BODY MASS INDEX DOCD: CPT | Mod: CPTII,S$GLB,, | Performed by: INTERNAL MEDICINE

## 2021-11-01 PROCEDURE — 85025 COMPLETE CBC W/AUTO DIFF WBC: CPT | Mod: PN | Performed by: INTERNAL MEDICINE

## 2021-11-01 PROCEDURE — 1160F PR REVIEW ALL MEDS BY PRESCRIBER/CLIN PHARMACIST DOCUMENTED: ICD-10-PCS | Mod: CPTII,S$GLB,, | Performed by: INTERNAL MEDICINE

## 2021-11-01 PROCEDURE — 36415 COLL VENOUS BLD VENIPUNCTURE: CPT | Mod: PN | Performed by: INTERNAL MEDICINE

## 2021-11-01 PROCEDURE — 80053 COMPREHEN METABOLIC PANEL: CPT | Mod: PN | Performed by: INTERNAL MEDICINE

## 2021-11-01 PROCEDURE — 1159F PR MEDICATION LIST DOCUMENTED IN MEDICAL RECORD: ICD-10-PCS | Mod: CPTII,S$GLB,, | Performed by: INTERNAL MEDICINE

## 2021-11-01 PROCEDURE — 3079F DIAST BP 80-89 MM HG: CPT | Mod: CPTII,S$GLB,, | Performed by: INTERNAL MEDICINE

## 2021-11-01 PROCEDURE — 3074F SYST BP LT 130 MM HG: CPT | Mod: CPTII,S$GLB,, | Performed by: INTERNAL MEDICINE

## 2021-11-01 PROCEDURE — 83735 ASSAY OF MAGNESIUM: CPT | Mod: PN | Performed by: INTERNAL MEDICINE

## 2021-11-01 PROCEDURE — 3008F PR BODY MASS INDEX (BMI) DOCUMENTED: ICD-10-PCS | Mod: CPTII,S$GLB,, | Performed by: INTERNAL MEDICINE

## 2021-11-01 PROCEDURE — 1160F RVW MEDS BY RX/DR IN RCRD: CPT | Mod: CPTII,S$GLB,, | Performed by: INTERNAL MEDICINE

## 2021-11-01 PROCEDURE — 99999 PR PBB SHADOW E&M-EST. PATIENT-LVL IV: CPT | Mod: PBBFAC,,, | Performed by: INTERNAL MEDICINE

## 2021-11-01 PROCEDURE — 99999 PR PBB SHADOW E&M-EST. PATIENT-LVL IV: ICD-10-PCS | Mod: PBBFAC,,, | Performed by: INTERNAL MEDICINE

## 2021-11-01 PROCEDURE — 1159F MED LIST DOCD IN RCRD: CPT | Mod: CPTII,S$GLB,, | Performed by: INTERNAL MEDICINE

## 2021-11-01 PROCEDURE — 99215 PR OFFICE/OUTPT VISIT, EST, LEVL V, 40-54 MIN: ICD-10-PCS | Mod: S$GLB,,, | Performed by: INTERNAL MEDICINE

## 2021-11-01 PROCEDURE — 3074F PR MOST RECENT SYSTOLIC BLOOD PRESSURE < 130 MM HG: ICD-10-PCS | Mod: CPTII,S$GLB,, | Performed by: INTERNAL MEDICINE

## 2021-11-01 RX ORDER — HEPARIN 100 UNIT/ML
500 SYRINGE INTRAVENOUS
Status: CANCELLED | OUTPATIENT
Start: 2021-11-03

## 2021-11-01 RX ORDER — FLUOROURACIL 50 MG/ML
400 INJECTION, SOLUTION INTRAVENOUS
Status: CANCELLED | OUTPATIENT
Start: 2021-11-03

## 2021-11-01 RX ORDER — HEPARIN 100 UNIT/ML
500 SYRINGE INTRAVENOUS
Status: CANCELLED | OUTPATIENT
Start: 2021-11-05

## 2021-11-01 RX ORDER — SODIUM CHLORIDE 0.9 % (FLUSH) 0.9 %
10 SYRINGE (ML) INJECTION
Status: CANCELLED | OUTPATIENT
Start: 2021-11-05

## 2021-11-01 RX ORDER — SODIUM CHLORIDE 0.9 % (FLUSH) 0.9 %
10 SYRINGE (ML) INJECTION
Status: CANCELLED | OUTPATIENT
Start: 2021-11-03

## 2021-11-01 RX ORDER — LORAZEPAM 2 MG/ML
1 INJECTION INTRAMUSCULAR
Status: CANCELLED
Start: 2021-11-03 | End: 2021-11-03

## 2021-11-01 RX ORDER — ATROPINE SULFATE 0.4 MG/ML
0.4 INJECTION, SOLUTION ENDOTRACHEAL; INTRAMEDULLARY; INTRAMUSCULAR; INTRAVENOUS; SUBCUTANEOUS ONCE AS NEEDED
Status: CANCELLED | OUTPATIENT
Start: 2021-11-03

## 2021-11-03 ENCOUNTER — DOCUMENTATION ONLY (OUTPATIENT)
Dept: INFUSION THERAPY | Facility: HOSPITAL | Age: 51
End: 2021-11-03
Payer: COMMERCIAL

## 2021-11-03 ENCOUNTER — INFUSION (OUTPATIENT)
Dept: INFUSION THERAPY | Facility: HOSPITAL | Age: 51
End: 2021-11-03
Attending: INTERNAL MEDICINE
Payer: COMMERCIAL

## 2021-11-03 ENCOUNTER — TELEPHONE (OUTPATIENT)
Dept: HEMATOLOGY/ONCOLOGY | Facility: CLINIC | Age: 51
End: 2021-11-03
Payer: COMMERCIAL

## 2021-11-03 VITALS
TEMPERATURE: 98 F | WEIGHT: 293 LBS | HEART RATE: 82 BPM | DIASTOLIC BLOOD PRESSURE: 82 MMHG | RESPIRATION RATE: 16 BRPM | HEIGHT: 66 IN | SYSTOLIC BLOOD PRESSURE: 132 MMHG | BODY MASS INDEX: 47.09 KG/M2

## 2021-11-03 DIAGNOSIS — C77.2 METASTASIS TO INTESTINAL LYMPH NODE: Primary | ICD-10-CM

## 2021-11-03 DIAGNOSIS — C18.7 MALIGNANT NEOPLASM OF SIGMOID COLON: ICD-10-CM

## 2021-11-03 PROCEDURE — 96368 THER/DIAG CONCURRENT INF: CPT | Mod: PN

## 2021-11-03 PROCEDURE — 96415 CHEMO IV INFUSION ADDL HR: CPT | Mod: PN

## 2021-11-03 PROCEDURE — A4216 STERILE WATER/SALINE, 10 ML: HCPCS | Mod: PN | Performed by: INTERNAL MEDICINE

## 2021-11-03 PROCEDURE — 96375 TX/PRO/DX INJ NEW DRUG ADDON: CPT | Mod: PN

## 2021-11-03 PROCEDURE — 63600175 PHARM REV CODE 636 W HCPCS: Mod: PN | Performed by: INTERNAL MEDICINE

## 2021-11-03 PROCEDURE — 96411 CHEMO IV PUSH ADDL DRUG: CPT | Mod: PN

## 2021-11-03 PROCEDURE — 96413 CHEMO IV INFUSION 1 HR: CPT | Mod: PN

## 2021-11-03 PROCEDURE — 96417 CHEMO IV INFUS EACH ADDL SEQ: CPT | Mod: PN

## 2021-11-03 PROCEDURE — 96416 CHEMO PROLONG INFUSE W/PUMP: CPT | Mod: PN

## 2021-11-03 PROCEDURE — 96367 TX/PROPH/DG ADDL SEQ IV INF: CPT | Mod: PN

## 2021-11-03 PROCEDURE — 25000003 PHARM REV CODE 250: Mod: PN | Performed by: INTERNAL MEDICINE

## 2021-11-03 RX ORDER — ATROPINE SULFATE 0.4 MG/ML
0.4 INJECTION, SOLUTION ENDOTRACHEAL; INTRAMEDULLARY; INTRAMUSCULAR; INTRAVENOUS; SUBCUTANEOUS ONCE AS NEEDED
Status: DISCONTINUED | OUTPATIENT
Start: 2021-11-03 | End: 2021-11-03 | Stop reason: HOSPADM

## 2021-11-03 RX ORDER — LORAZEPAM 2 MG/ML
1 INJECTION INTRAMUSCULAR
Status: COMPLETED | OUTPATIENT
Start: 2021-11-03 | End: 2021-11-03

## 2021-11-03 RX ORDER — FLUOROURACIL 50 MG/ML
400 INJECTION, SOLUTION INTRAVENOUS
Status: COMPLETED | OUTPATIENT
Start: 2021-11-03 | End: 2021-11-03

## 2021-11-03 RX ORDER — HEPARIN 100 UNIT/ML
500 SYRINGE INTRAVENOUS
Status: DISCONTINUED | OUTPATIENT
Start: 2021-11-03 | End: 2021-11-03 | Stop reason: HOSPADM

## 2021-11-03 RX ORDER — SODIUM CHLORIDE 0.9 % (FLUSH) 0.9 %
10 SYRINGE (ML) INJECTION
Status: DISCONTINUED | OUTPATIENT
Start: 2021-11-03 | End: 2021-11-03 | Stop reason: HOSPADM

## 2021-11-03 RX ADMIN — PALONOSETRON 0.25 MG: 0.05 INJECTION, SOLUTION INTRAVENOUS at 02:11

## 2021-11-03 RX ADMIN — LORAZEPAM 1 MG: 2 INJECTION INTRAMUSCULAR; INTRAVENOUS at 01:11

## 2021-11-03 RX ADMIN — LEUCOVORIN CALCIUM 990 MG: 350 INJECTION, POWDER, LYOPHILIZED, FOR SUSPENSION INTRAMUSCULAR; INTRAVENOUS at 02:11

## 2021-11-03 RX ADMIN — Medication 10 ML: at 12:11

## 2021-11-03 RX ADMIN — SODIUM CHLORIDE: 0.9 INJECTION, SOLUTION INTRAVENOUS at 12:11

## 2021-11-03 RX ADMIN — BEVACIZUMAB 600 MG: 400 INJECTION, SOLUTION INTRAVENOUS at 12:11

## 2021-11-03 RX ADMIN — PROMETHAZINE HYDROCHLORIDE 6.25 MG: 25 INJECTION INTRAMUSCULAR; INTRAVENOUS at 01:11

## 2021-11-03 RX ADMIN — SODIUM CHLORIDE 440 MG: 0.9 INJECTION, SOLUTION INTRAVENOUS at 02:11

## 2021-11-03 RX ADMIN — FLUOROURACIL 5930 MG: 50 INJECTION, SOLUTION INTRAVENOUS at 04:11

## 2021-11-03 RX ADMIN — FLUOROURACIL 990 MG: 50 INJECTION, SOLUTION INTRAVENOUS at 04:11

## 2021-11-04 ENCOUNTER — DOCUMENTATION ONLY (OUTPATIENT)
Dept: INFUSION THERAPY | Facility: HOSPITAL | Age: 51
End: 2021-11-04
Payer: COMMERCIAL

## 2021-11-05 ENCOUNTER — OFFICE VISIT (OUTPATIENT)
Dept: PSYCHIATRY | Facility: CLINIC | Age: 51
End: 2021-11-05
Payer: COMMERCIAL

## 2021-11-05 ENCOUNTER — INFUSION (OUTPATIENT)
Dept: INFUSION THERAPY | Facility: HOSPITAL | Age: 51
End: 2021-11-05
Attending: INTERNAL MEDICINE
Payer: COMMERCIAL

## 2021-11-05 VITALS
BODY MASS INDEX: 47.09 KG/M2 | RESPIRATION RATE: 16 BRPM | DIASTOLIC BLOOD PRESSURE: 72 MMHG | HEIGHT: 66 IN | SYSTOLIC BLOOD PRESSURE: 142 MMHG | WEIGHT: 293 LBS | HEART RATE: 79 BPM | TEMPERATURE: 98 F

## 2021-11-05 DIAGNOSIS — F41.1 ANXIETY ASSOCIATED WITH CANCER DIAGNOSIS: ICD-10-CM

## 2021-11-05 DIAGNOSIS — C80.1 ANXIETY ASSOCIATED WITH CANCER DIAGNOSIS: ICD-10-CM

## 2021-11-05 DIAGNOSIS — C18.7 MALIGNANT NEOPLASM OF SIGMOID COLON: ICD-10-CM

## 2021-11-05 DIAGNOSIS — E83.52 HYPERCALCEMIA: ICD-10-CM

## 2021-11-05 DIAGNOSIS — C77.2 METASTASIS TO INTESTINAL LYMPH NODE: Primary | ICD-10-CM

## 2021-11-05 DIAGNOSIS — F33.1 MODERATE EPISODE OF RECURRENT MAJOR DEPRESSIVE DISORDER: Primary | ICD-10-CM

## 2021-11-05 PROCEDURE — A4216 STERILE WATER/SALINE, 10 ML: HCPCS | Mod: PN | Performed by: INTERNAL MEDICINE

## 2021-11-05 PROCEDURE — 63600175 PHARM REV CODE 636 W HCPCS: Mod: PN | Performed by: INTERNAL MEDICINE

## 2021-11-05 PROCEDURE — 96360 HYDRATION IV INFUSION INIT: CPT | Mod: PN

## 2021-11-05 PROCEDURE — 25000003 PHARM REV CODE 250: Mod: PN | Performed by: INTERNAL MEDICINE

## 2021-11-05 PROCEDURE — 90834 PSYTX W PT 45 MINUTES: CPT | Mod: S$GLB,,, | Performed by: PSYCHOLOGIST

## 2021-11-05 PROCEDURE — 90834 PR PSYCHOTHERAPY W/PATIENT, 45 MIN: ICD-10-PCS | Mod: S$GLB,,, | Performed by: PSYCHOLOGIST

## 2021-11-05 RX ORDER — SODIUM CHLORIDE 0.9 % (FLUSH) 0.9 %
10 SYRINGE (ML) INJECTION
Status: DISCONTINUED | OUTPATIENT
Start: 2021-11-05 | End: 2021-11-05 | Stop reason: HOSPADM

## 2021-11-05 RX ORDER — HEPARIN 100 UNIT/ML
500 SYRINGE INTRAVENOUS
Status: DISCONTINUED | OUTPATIENT
Start: 2021-11-05 | End: 2021-11-05 | Stop reason: HOSPADM

## 2021-11-05 RX ORDER — SODIUM CHLORIDE 9 MG/ML
1000 INJECTION, SOLUTION INTRAVENOUS
Status: COMPLETED | OUTPATIENT
Start: 2021-11-05 | End: 2021-11-05

## 2021-11-05 RX ADMIN — SODIUM CHLORIDE 1000 ML: 0.9 INJECTION, SOLUTION INTRAVENOUS at 02:11

## 2021-11-05 RX ADMIN — Medication 10 ML: at 03:11

## 2021-11-05 RX ADMIN — HEPARIN 500 UNITS: 100 SYRINGE at 03:11

## 2021-11-11 ENCOUNTER — PATIENT MESSAGE (OUTPATIENT)
Dept: HEMATOLOGY/ONCOLOGY | Facility: CLINIC | Age: 51
End: 2021-11-11
Payer: COMMERCIAL

## 2021-11-15 ENCOUNTER — PATIENT MESSAGE (OUTPATIENT)
Dept: HEMATOLOGY/ONCOLOGY | Facility: CLINIC | Age: 51
End: 2021-11-15

## 2021-11-15 ENCOUNTER — LAB VISIT (OUTPATIENT)
Dept: LAB | Facility: HOSPITAL | Age: 51
End: 2021-11-15
Attending: INTERNAL MEDICINE
Payer: COMMERCIAL

## 2021-11-15 ENCOUNTER — PATIENT MESSAGE (OUTPATIENT)
Dept: ADMINISTRATIVE | Facility: OTHER | Age: 51
End: 2021-11-15
Payer: COMMERCIAL

## 2021-11-15 ENCOUNTER — OFFICE VISIT (OUTPATIENT)
Dept: HEMATOLOGY/ONCOLOGY | Facility: CLINIC | Age: 51
End: 2021-11-15
Payer: COMMERCIAL

## 2021-11-15 VITALS
HEART RATE: 89 BPM | OXYGEN SATURATION: 99 % | SYSTOLIC BLOOD PRESSURE: 138 MMHG | RESPIRATION RATE: 18 BRPM | HEIGHT: 66 IN | WEIGHT: 293 LBS | TEMPERATURE: 98 F | BODY MASS INDEX: 47.09 KG/M2 | DIASTOLIC BLOOD PRESSURE: 94 MMHG

## 2021-11-15 DIAGNOSIS — F32.A DEPRESSION, UNSPECIFIED DEPRESSION TYPE: ICD-10-CM

## 2021-11-15 DIAGNOSIS — C77.2 METASTASIS TO INTESTINAL LYMPH NODE: ICD-10-CM

## 2021-11-15 DIAGNOSIS — C77.9 SECONDARY ADENOCARCINOMA OF LYMPH NODE: ICD-10-CM

## 2021-11-15 DIAGNOSIS — K59.09 OTHER CONSTIPATION: ICD-10-CM

## 2021-11-15 DIAGNOSIS — C18.7 MALIGNANT NEOPLASM OF SIGMOID COLON: Primary | ICD-10-CM

## 2021-11-15 DIAGNOSIS — F41.9 ANXIETY: ICD-10-CM

## 2021-11-15 DIAGNOSIS — C18.7 MALIGNANT NEOPLASM OF SIGMOID COLON: ICD-10-CM

## 2021-11-15 DIAGNOSIS — R11.0 NAUSEA: ICD-10-CM

## 2021-11-15 DIAGNOSIS — E83.52 HYPERCALCEMIA: ICD-10-CM

## 2021-11-15 LAB
ALBUMIN SERPL BCP-MCNC: 4 G/DL (ref 3.5–5.2)
ALP SERPL-CCNC: 150 U/L (ref 55–135)
ALT SERPL W/O P-5'-P-CCNC: 68 U/L (ref 10–44)
ANION GAP SERPL CALC-SCNC: 7 MMOL/L (ref 8–16)
AST SERPL-CCNC: 43 U/L (ref 10–40)
BASOPHILS # BLD AUTO: 0.03 K/UL (ref 0–0.2)
BASOPHILS NFR BLD: 0.4 % (ref 0–1.9)
BILIRUB SERPL-MCNC: 0.4 MG/DL (ref 0.1–1)
BUN SERPL-MCNC: 14 MG/DL (ref 6–20)
CALCIUM SERPL-MCNC: 10.8 MG/DL (ref 8.7–10.5)
CHLORIDE SERPL-SCNC: 108 MMOL/L (ref 95–110)
CO2 SERPL-SCNC: 25 MMOL/L (ref 23–29)
CREAT SERPL-MCNC: 0.8 MG/DL (ref 0.5–1.4)
DIFFERENTIAL METHOD: ABNORMAL
EOSINOPHIL # BLD AUTO: 0.3 K/UL (ref 0–0.5)
EOSINOPHIL NFR BLD: 3.7 % (ref 0–8)
ERYTHROCYTE [DISTWIDTH] IN BLOOD BY AUTOMATED COUNT: 17.1 % (ref 11.5–14.5)
EST. GFR  (AFRICAN AMERICAN): >60 ML/MIN/1.73 M^2
EST. GFR  (NON AFRICAN AMERICAN): >60 ML/MIN/1.73 M^2
GLUCOSE SERPL-MCNC: 102 MG/DL (ref 70–110)
HCT VFR BLD AUTO: 44.9 % (ref 37–48.5)
HGB BLD-MCNC: 14.3 G/DL (ref 12–16)
IMM GRANULOCYTES # BLD AUTO: 0.01 K/UL (ref 0–0.04)
IMM GRANULOCYTES NFR BLD AUTO: 0.1 % (ref 0–0.5)
LYMPHOCYTES # BLD AUTO: 1.7 K/UL (ref 1–4.8)
LYMPHOCYTES NFR BLD: 23.5 % (ref 18–48)
MAGNESIUM SERPL-MCNC: 2.1 MG/DL (ref 1.6–2.6)
MCH RBC QN AUTO: 30.8 PG (ref 27–31)
MCHC RBC AUTO-ENTMCNC: 31.8 G/DL (ref 32–36)
MCV RBC AUTO: 97 FL (ref 82–98)
MONOCYTES # BLD AUTO: 0.9 K/UL (ref 0.3–1)
MONOCYTES NFR BLD: 12.3 % (ref 4–15)
NEUTROPHILS # BLD AUTO: 4.3 K/UL (ref 1.8–7.7)
NEUTROPHILS NFR BLD: 60 % (ref 38–73)
NRBC BLD-RTO: 0 /100 WBC
PLATELET # BLD AUTO: 256 K/UL (ref 150–450)
PMV BLD AUTO: 9.6 FL (ref 9.2–12.9)
POTASSIUM SERPL-SCNC: 4.7 MMOL/L (ref 3.5–5.1)
PROT SERPL-MCNC: 7 G/DL (ref 6–8.4)
RBC # BLD AUTO: 4.64 M/UL (ref 4–5.4)
SODIUM SERPL-SCNC: 140 MMOL/L (ref 136–145)
WBC # BLD AUTO: 7.24 K/UL (ref 3.9–12.7)

## 2021-11-15 PROCEDURE — 83735 ASSAY OF MAGNESIUM: CPT | Mod: PN | Performed by: INTERNAL MEDICINE

## 2021-11-15 PROCEDURE — 3080F PR MOST RECENT DIASTOLIC BLOOD PRESSURE >= 90 MM HG: ICD-10-PCS | Mod: CPTII,S$GLB,, | Performed by: INTERNAL MEDICINE

## 2021-11-15 PROCEDURE — 99215 OFFICE O/P EST HI 40 MIN: CPT | Mod: S$GLB,,, | Performed by: INTERNAL MEDICINE

## 2021-11-15 PROCEDURE — 36415 COLL VENOUS BLD VENIPUNCTURE: CPT | Mod: PN | Performed by: INTERNAL MEDICINE

## 2021-11-15 PROCEDURE — 3008F PR BODY MASS INDEX (BMI) DOCUMENTED: ICD-10-PCS | Mod: CPTII,S$GLB,, | Performed by: INTERNAL MEDICINE

## 2021-11-15 PROCEDURE — 3080F DIAST BP >= 90 MM HG: CPT | Mod: CPTII,S$GLB,, | Performed by: INTERNAL MEDICINE

## 2021-11-15 PROCEDURE — 3075F PR MOST RECENT SYSTOLIC BLOOD PRESS GE 130-139MM HG: ICD-10-PCS | Mod: CPTII,S$GLB,, | Performed by: INTERNAL MEDICINE

## 2021-11-15 PROCEDURE — 85025 COMPLETE CBC W/AUTO DIFF WBC: CPT | Mod: PN | Performed by: INTERNAL MEDICINE

## 2021-11-15 PROCEDURE — 99215 PR OFFICE/OUTPT VISIT, EST, LEVL V, 40-54 MIN: ICD-10-PCS | Mod: S$GLB,,, | Performed by: INTERNAL MEDICINE

## 2021-11-15 PROCEDURE — 3075F SYST BP GE 130 - 139MM HG: CPT | Mod: CPTII,S$GLB,, | Performed by: INTERNAL MEDICINE

## 2021-11-15 PROCEDURE — 99999 PR PBB SHADOW E&M-EST. PATIENT-LVL IV: CPT | Mod: PBBFAC,,, | Performed by: INTERNAL MEDICINE

## 2021-11-15 PROCEDURE — 99999 PR PBB SHADOW E&M-EST. PATIENT-LVL IV: ICD-10-PCS | Mod: PBBFAC,,, | Performed by: INTERNAL MEDICINE

## 2021-11-15 PROCEDURE — 80053 COMPREHEN METABOLIC PANEL: CPT | Mod: PN | Performed by: INTERNAL MEDICINE

## 2021-11-15 PROCEDURE — 3008F BODY MASS INDEX DOCD: CPT | Mod: CPTII,S$GLB,, | Performed by: INTERNAL MEDICINE

## 2021-11-15 RX ORDER — ATROPINE SULFATE 0.4 MG/ML
0.4 INJECTION, SOLUTION ENDOTRACHEAL; INTRAMEDULLARY; INTRAMUSCULAR; INTRAVENOUS; SUBCUTANEOUS ONCE AS NEEDED
Status: CANCELLED | OUTPATIENT
Start: 2021-11-17 | End: 2033-04-14

## 2021-11-15 RX ORDER — BUPROPION HYDROCHLORIDE 150 MG/1
150 TABLET, EXTENDED RELEASE ORAL 2 TIMES DAILY
Qty: 180 TABLET | Refills: 0 | Status: SHIPPED | OUTPATIENT
Start: 2021-11-15 | End: 2022-10-18 | Stop reason: SDUPTHER

## 2021-11-15 RX ORDER — HEPARIN 100 UNIT/ML
500 SYRINGE INTRAVENOUS
Status: CANCELLED | OUTPATIENT
Start: 2021-11-19

## 2021-11-15 RX ORDER — SODIUM CHLORIDE 0.9 % (FLUSH) 0.9 %
10 SYRINGE (ML) INJECTION
Status: CANCELLED | OUTPATIENT
Start: 2021-11-19

## 2021-11-15 RX ORDER — LORAZEPAM 2 MG/ML
1 INJECTION INTRAMUSCULAR
Status: CANCELLED | OUTPATIENT
Start: 2021-11-17

## 2021-11-15 RX ORDER — SODIUM CHLORIDE 0.9 % (FLUSH) 0.9 %
10 SYRINGE (ML) INJECTION
Status: CANCELLED | OUTPATIENT
Start: 2021-11-17

## 2021-11-15 RX ORDER — HEPARIN 100 UNIT/ML
500 SYRINGE INTRAVENOUS
Status: CANCELLED | OUTPATIENT
Start: 2021-11-17

## 2021-11-15 RX ORDER — FLUOROURACIL 50 MG/ML
400 INJECTION, SOLUTION INTRAVENOUS
Status: CANCELLED | OUTPATIENT
Start: 2021-11-17

## 2021-11-17 ENCOUNTER — INFUSION (OUTPATIENT)
Dept: INFUSION THERAPY | Facility: HOSPITAL | Age: 51
End: 2021-11-17
Attending: INTERNAL MEDICINE
Payer: COMMERCIAL

## 2021-11-17 ENCOUNTER — OFFICE VISIT (OUTPATIENT)
Dept: PSYCHIATRY | Facility: CLINIC | Age: 51
End: 2021-11-17
Payer: COMMERCIAL

## 2021-11-17 VITALS
DIASTOLIC BLOOD PRESSURE: 90 MMHG | RESPIRATION RATE: 18 BRPM | WEIGHT: 293 LBS | HEART RATE: 82 BPM | BODY MASS INDEX: 47.09 KG/M2 | HEIGHT: 66 IN | SYSTOLIC BLOOD PRESSURE: 141 MMHG

## 2021-11-17 DIAGNOSIS — C80.1 ANXIETY ASSOCIATED WITH CANCER DIAGNOSIS: ICD-10-CM

## 2021-11-17 DIAGNOSIS — C77.2 METASTASIS TO INTESTINAL LYMPH NODE: Primary | ICD-10-CM

## 2021-11-17 DIAGNOSIS — F41.1 ANXIETY ASSOCIATED WITH CANCER DIAGNOSIS: ICD-10-CM

## 2021-11-17 DIAGNOSIS — F33.1 MODERATE EPISODE OF RECURRENT MAJOR DEPRESSIVE DISORDER: Primary | ICD-10-CM

## 2021-11-17 DIAGNOSIS — C18.7 MALIGNANT NEOPLASM OF SIGMOID COLON: ICD-10-CM

## 2021-11-17 PROCEDURE — 90837 PR PSYCHOTHERAPY W/PATIENT, 60 MIN: ICD-10-PCS | Mod: S$GLB,,, | Performed by: PSYCHOLOGIST

## 2021-11-17 PROCEDURE — 25000003 PHARM REV CODE 250: Mod: PN | Performed by: INTERNAL MEDICINE

## 2021-11-17 PROCEDURE — 96368 THER/DIAG CONCURRENT INF: CPT | Mod: PN

## 2021-11-17 PROCEDURE — 96415 CHEMO IV INFUSION ADDL HR: CPT | Mod: PN

## 2021-11-17 PROCEDURE — 90837 PSYTX W PT 60 MINUTES: CPT | Mod: S$GLB,,, | Performed by: PSYCHOLOGIST

## 2021-11-17 PROCEDURE — 96416 CHEMO PROLONG INFUSE W/PUMP: CPT | Mod: PN

## 2021-11-17 PROCEDURE — 96367 TX/PROPH/DG ADDL SEQ IV INF: CPT | Mod: PN

## 2021-11-17 PROCEDURE — 63600175 PHARM REV CODE 636 W HCPCS: Mod: JG,PN | Performed by: INTERNAL MEDICINE

## 2021-11-17 PROCEDURE — 96411 CHEMO IV PUSH ADDL DRUG: CPT | Mod: PN

## 2021-11-17 PROCEDURE — 96413 CHEMO IV INFUSION 1 HR: CPT | Mod: PN

## 2021-11-17 PROCEDURE — 96417 CHEMO IV INFUS EACH ADDL SEQ: CPT | Mod: PN

## 2021-11-17 RX ORDER — LORAZEPAM 2 MG/ML
1 INJECTION INTRAMUSCULAR
Status: DISCONTINUED | OUTPATIENT
Start: 2021-11-17 | End: 2021-11-17 | Stop reason: HOSPADM

## 2021-11-17 RX ORDER — HEPARIN 100 UNIT/ML
500 SYRINGE INTRAVENOUS
Status: DISCONTINUED | OUTPATIENT
Start: 2021-11-17 | End: 2021-11-17 | Stop reason: HOSPADM

## 2021-11-17 RX ORDER — ATROPINE SULFATE 0.4 MG/ML
0.4 INJECTION, SOLUTION ENDOTRACHEAL; INTRAMEDULLARY; INTRAMUSCULAR; INTRAVENOUS; SUBCUTANEOUS ONCE AS NEEDED
Status: DISCONTINUED | OUTPATIENT
Start: 2021-11-17 | End: 2021-11-17 | Stop reason: HOSPADM

## 2021-11-17 RX ORDER — FLUOROURACIL 50 MG/ML
400 INJECTION, SOLUTION INTRAVENOUS
Status: COMPLETED | OUTPATIENT
Start: 2021-11-17 | End: 2021-11-17

## 2021-11-17 RX ORDER — SODIUM CHLORIDE 0.9 % (FLUSH) 0.9 %
10 SYRINGE (ML) INJECTION
Status: DISCONTINUED | OUTPATIENT
Start: 2021-11-17 | End: 2021-11-17 | Stop reason: HOSPADM

## 2021-11-17 RX ADMIN — SODIUM CHLORIDE 440 MG: 0.9 INJECTION, SOLUTION INTRAVENOUS at 02:11

## 2021-11-17 RX ADMIN — BEVACIZUMAB 600 MG: 400 INJECTION, SOLUTION INTRAVENOUS at 01:11

## 2021-11-17 RX ADMIN — PROMETHAZINE HYDROCHLORIDE 6.25 MG: 25 INJECTION INTRAMUSCULAR; INTRAVENOUS at 01:11

## 2021-11-17 RX ADMIN — FLUOROURACIL 5930 MG: 50 INJECTION, SOLUTION INTRAVENOUS at 04:11

## 2021-11-17 RX ADMIN — LEUCOVORIN CALCIUM 990 MG: 200 INJECTION, POWDER, LYOPHILIZED, FOR SOLUTION INTRAMUSCULAR; INTRAVENOUS at 02:11

## 2021-11-17 RX ADMIN — FLUOROURACIL 990 MG: 50 INJECTION, SOLUTION INTRAVENOUS at 04:11

## 2021-11-17 RX ADMIN — SODIUM CHLORIDE: 0.9 INJECTION, SOLUTION INTRAVENOUS at 01:11

## 2021-11-17 RX ADMIN — PALONOSETRON 0.25 MG: 0.05 INJECTION, SOLUTION INTRAVENOUS at 02:11

## 2021-11-18 ENCOUNTER — DOCUMENTATION ONLY (OUTPATIENT)
Dept: INFUSION THERAPY | Facility: HOSPITAL | Age: 51
End: 2021-11-18
Payer: COMMERCIAL

## 2021-11-19 ENCOUNTER — INFUSION (OUTPATIENT)
Dept: INFUSION THERAPY | Facility: HOSPITAL | Age: 51
End: 2021-11-19
Attending: INTERNAL MEDICINE
Payer: COMMERCIAL

## 2021-11-19 VITALS
HEART RATE: 94 BPM | SYSTOLIC BLOOD PRESSURE: 147 MMHG | RESPIRATION RATE: 18 BRPM | DIASTOLIC BLOOD PRESSURE: 82 MMHG | TEMPERATURE: 98 F

## 2021-11-19 DIAGNOSIS — C18.7 MALIGNANT NEOPLASM OF SIGMOID COLON: ICD-10-CM

## 2021-11-19 DIAGNOSIS — C77.2 METASTASIS TO INTESTINAL LYMPH NODE: Primary | ICD-10-CM

## 2021-11-19 PROCEDURE — 96523 IRRIG DRUG DELIVERY DEVICE: CPT | Mod: PN

## 2021-11-19 RX ORDER — HEPARIN 100 UNIT/ML
500 SYRINGE INTRAVENOUS
Status: DISCONTINUED | OUTPATIENT
Start: 2021-11-19 | End: 2021-11-19 | Stop reason: HOSPADM

## 2021-11-19 RX ORDER — SODIUM CHLORIDE 0.9 % (FLUSH) 0.9 %
10 SYRINGE (ML) INJECTION
Status: DISCONTINUED | OUTPATIENT
Start: 2021-11-19 | End: 2021-11-19 | Stop reason: HOSPADM

## 2021-11-29 ENCOUNTER — PATIENT MESSAGE (OUTPATIENT)
Dept: HEMATOLOGY/ONCOLOGY | Facility: CLINIC | Age: 51
End: 2021-11-29

## 2021-11-29 ENCOUNTER — LAB VISIT (OUTPATIENT)
Dept: LAB | Facility: HOSPITAL | Age: 51
End: 2021-11-29
Attending: INTERNAL MEDICINE
Payer: COMMERCIAL

## 2021-11-29 ENCOUNTER — OFFICE VISIT (OUTPATIENT)
Dept: HEMATOLOGY/ONCOLOGY | Facility: CLINIC | Age: 51
End: 2021-11-29
Payer: COMMERCIAL

## 2021-11-29 VITALS
HEIGHT: 66 IN | BODY MASS INDEX: 47.09 KG/M2 | RESPIRATION RATE: 18 BRPM | SYSTOLIC BLOOD PRESSURE: 142 MMHG | HEART RATE: 85 BPM | TEMPERATURE: 98 F | DIASTOLIC BLOOD PRESSURE: 100 MMHG | WEIGHT: 293 LBS | OXYGEN SATURATION: 99 %

## 2021-11-29 DIAGNOSIS — C77.9 SECONDARY ADENOCARCINOMA OF LYMPH NODE: ICD-10-CM

## 2021-11-29 DIAGNOSIS — N20.0 RENAL STONES: ICD-10-CM

## 2021-11-29 DIAGNOSIS — F41.9 ANXIETY: ICD-10-CM

## 2021-11-29 DIAGNOSIS — C77.2 METASTASIS TO INTESTINAL LYMPH NODE: ICD-10-CM

## 2021-11-29 DIAGNOSIS — M54.50 CHRONIC LOW BACK PAIN, UNSPECIFIED BACK PAIN LATERALITY, UNSPECIFIED WHETHER SCIATICA PRESENT: ICD-10-CM

## 2021-11-29 DIAGNOSIS — D64.9 NORMOCYTIC ANEMIA: ICD-10-CM

## 2021-11-29 DIAGNOSIS — C18.7 MALIGNANT NEOPLASM OF SIGMOID COLON: Primary | ICD-10-CM

## 2021-11-29 DIAGNOSIS — G47.00 INSOMNIA, UNSPECIFIED TYPE: ICD-10-CM

## 2021-11-29 DIAGNOSIS — G89.29 CHRONIC LOW BACK PAIN, UNSPECIFIED BACK PAIN LATERALITY, UNSPECIFIED WHETHER SCIATICA PRESENT: ICD-10-CM

## 2021-11-29 DIAGNOSIS — E83.52 HYPERCALCEMIA: ICD-10-CM

## 2021-11-29 DIAGNOSIS — C18.7 MALIGNANT NEOPLASM OF SIGMOID COLON: ICD-10-CM

## 2021-11-29 LAB
ALBUMIN SERPL BCP-MCNC: 3.7 G/DL (ref 3.5–5.2)
ALP SERPL-CCNC: 148 U/L (ref 55–135)
ALT SERPL W/O P-5'-P-CCNC: 54 U/L (ref 10–44)
ANION GAP SERPL CALC-SCNC: 10 MMOL/L (ref 8–16)
AST SERPL-CCNC: 33 U/L (ref 10–40)
BASOPHILS # BLD AUTO: 0.02 K/UL (ref 0–0.2)
BASOPHILS NFR BLD: 0.6 % (ref 0–1.9)
BILIRUB SERPL-MCNC: 0.4 MG/DL (ref 0.1–1)
BUN SERPL-MCNC: 10 MG/DL (ref 6–20)
CALCIUM SERPL-MCNC: 10.3 MG/DL (ref 8.7–10.5)
CHLORIDE SERPL-SCNC: 108 MMOL/L (ref 95–110)
CO2 SERPL-SCNC: 23 MMOL/L (ref 23–29)
CREAT SERPL-MCNC: 0.9 MG/DL (ref 0.5–1.4)
DIFFERENTIAL METHOD: ABNORMAL
EOSINOPHIL # BLD AUTO: 0.2 K/UL (ref 0–0.5)
EOSINOPHIL NFR BLD: 5 % (ref 0–8)
ERYTHROCYTE [DISTWIDTH] IN BLOOD BY AUTOMATED COUNT: 16 % (ref 11.5–14.5)
EST. GFR  (AFRICAN AMERICAN): >60 ML/MIN/1.73 M^2
EST. GFR  (NON AFRICAN AMERICAN): >60 ML/MIN/1.73 M^2
GLUCOSE SERPL-MCNC: 99 MG/DL (ref 70–110)
HCT VFR BLD AUTO: 41.2 % (ref 37–48.5)
HGB BLD-MCNC: 13.2 G/DL (ref 12–16)
IMM GRANULOCYTES # BLD AUTO: 0 K/UL (ref 0–0.04)
IMM GRANULOCYTES NFR BLD AUTO: 0 % (ref 0–0.5)
LYMPHOCYTES # BLD AUTO: 1.4 K/UL (ref 1–4.8)
LYMPHOCYTES NFR BLD: 41.9 % (ref 18–48)
MCH RBC QN AUTO: 31.4 PG (ref 27–31)
MCHC RBC AUTO-ENTMCNC: 32 G/DL (ref 32–36)
MCV RBC AUTO: 98 FL (ref 82–98)
MONOCYTES # BLD AUTO: 0.5 K/UL (ref 0.3–1)
MONOCYTES NFR BLD: 14 % (ref 4–15)
NEUTROPHILS # BLD AUTO: 1.2 K/UL (ref 1.8–7.7)
NEUTROPHILS NFR BLD: 38.5 % (ref 38–73)
NRBC BLD-RTO: 0 /100 WBC
PLATELET # BLD AUTO: 225 K/UL (ref 150–450)
PMV BLD AUTO: 9.2 FL (ref 9.2–12.9)
POTASSIUM SERPL-SCNC: 3.9 MMOL/L (ref 3.5–5.1)
PROT SERPL-MCNC: 6.6 G/DL (ref 6–8.4)
RBC # BLD AUTO: 4.2 M/UL (ref 4–5.4)
SODIUM SERPL-SCNC: 141 MMOL/L (ref 136–145)
WBC # BLD AUTO: 3.22 K/UL (ref 3.9–12.7)

## 2021-11-29 PROCEDURE — 99215 PR OFFICE/OUTPT VISIT, EST, LEVL V, 40-54 MIN: ICD-10-PCS | Mod: S$GLB,,, | Performed by: INTERNAL MEDICINE

## 2021-11-29 PROCEDURE — 99213 PR OFFICE/OUTPT VISIT, EST, LEVL III, 20-29 MIN: ICD-10-PCS | Mod: 95,,, | Performed by: PHYSICIAN ASSISTANT

## 2021-11-29 PROCEDURE — 99999 PR PBB SHADOW E&M-EST. PATIENT-LVL IV: CPT | Mod: PBBFAC,,, | Performed by: INTERNAL MEDICINE

## 2021-11-29 PROCEDURE — 85025 COMPLETE CBC W/AUTO DIFF WBC: CPT | Mod: PN | Performed by: INTERNAL MEDICINE

## 2021-11-29 PROCEDURE — 36415 COLL VENOUS BLD VENIPUNCTURE: CPT | Mod: PN | Performed by: INTERNAL MEDICINE

## 2021-11-29 PROCEDURE — 80053 COMPREHEN METABOLIC PANEL: CPT | Mod: PN | Performed by: INTERNAL MEDICINE

## 2021-11-29 PROCEDURE — 99213 OFFICE O/P EST LOW 20 MIN: CPT | Mod: 95,,, | Performed by: PHYSICIAN ASSISTANT

## 2021-11-29 PROCEDURE — 99999 PR PBB SHADOW E&M-EST. PATIENT-LVL IV: ICD-10-PCS | Mod: PBBFAC,,, | Performed by: INTERNAL MEDICINE

## 2021-11-29 PROCEDURE — 99215 OFFICE O/P EST HI 40 MIN: CPT | Mod: S$GLB,,, | Performed by: INTERNAL MEDICINE

## 2021-11-29 RX ORDER — HEPARIN 100 UNIT/ML
500 SYRINGE INTRAVENOUS
Status: CANCELLED | OUTPATIENT
Start: 2021-12-01

## 2021-11-29 RX ORDER — ATROPINE SULFATE 0.4 MG/ML
0.4 INJECTION, SOLUTION ENDOTRACHEAL; INTRAMEDULLARY; INTRAMUSCULAR; INTRAVENOUS; SUBCUTANEOUS ONCE AS NEEDED
Status: CANCELLED | OUTPATIENT
Start: 2021-12-01

## 2021-11-29 RX ORDER — FLUOROURACIL 50 MG/ML
400 INJECTION, SOLUTION INTRAVENOUS
Status: CANCELLED | OUTPATIENT
Start: 2021-12-01

## 2021-11-29 RX ORDER — SODIUM CHLORIDE 0.9 % (FLUSH) 0.9 %
10 SYRINGE (ML) INJECTION
Status: CANCELLED | OUTPATIENT
Start: 2021-12-01

## 2021-11-29 RX ORDER — HEPARIN 100 UNIT/ML
500 SYRINGE INTRAVENOUS
Status: CANCELLED | OUTPATIENT
Start: 2021-12-03

## 2021-11-29 RX ORDER — LORAZEPAM 2 MG/ML
1 INJECTION INTRAMUSCULAR
Status: CANCELLED
Start: 2021-12-01 | End: 2021-12-01

## 2021-11-29 RX ORDER — SODIUM CHLORIDE 0.9 % (FLUSH) 0.9 %
10 SYRINGE (ML) INJECTION
Status: CANCELLED | OUTPATIENT
Start: 2021-12-03

## 2021-12-01 ENCOUNTER — INFUSION (OUTPATIENT)
Dept: INFUSION THERAPY | Facility: HOSPITAL | Age: 51
End: 2021-12-01
Attending: INTERNAL MEDICINE
Payer: COMMERCIAL

## 2021-12-01 VITALS
WEIGHT: 293 LBS | OXYGEN SATURATION: 98 % | SYSTOLIC BLOOD PRESSURE: 166 MMHG | HEIGHT: 66 IN | RESPIRATION RATE: 18 BRPM | TEMPERATURE: 97 F | HEART RATE: 82 BPM | BODY MASS INDEX: 47.09 KG/M2 | DIASTOLIC BLOOD PRESSURE: 101 MMHG

## 2021-12-01 DIAGNOSIS — C77.2 METASTASIS TO INTESTINAL LYMPH NODE: Primary | ICD-10-CM

## 2021-12-01 DIAGNOSIS — C18.7 MALIGNANT NEOPLASM OF SIGMOID COLON: ICD-10-CM

## 2021-12-01 PROCEDURE — 96367 TX/PROPH/DG ADDL SEQ IV INF: CPT | Mod: PN

## 2021-12-01 PROCEDURE — 96415 CHEMO IV INFUSION ADDL HR: CPT | Mod: PN

## 2021-12-01 PROCEDURE — 96375 TX/PRO/DX INJ NEW DRUG ADDON: CPT | Mod: PN

## 2021-12-01 PROCEDURE — 96411 CHEMO IV PUSH ADDL DRUG: CPT | Mod: PN

## 2021-12-01 PROCEDURE — 96417 CHEMO IV INFUS EACH ADDL SEQ: CPT | Mod: PN

## 2021-12-01 PROCEDURE — 63600175 PHARM REV CODE 636 W HCPCS: Mod: PN | Performed by: INTERNAL MEDICINE

## 2021-12-01 PROCEDURE — 96416 CHEMO PROLONG INFUSE W/PUMP: CPT | Mod: PN

## 2021-12-01 PROCEDURE — 96413 CHEMO IV INFUSION 1 HR: CPT | Mod: PN

## 2021-12-01 PROCEDURE — 25000003 PHARM REV CODE 250: Mod: PN | Performed by: INTERNAL MEDICINE

## 2021-12-01 PROCEDURE — 96368 THER/DIAG CONCURRENT INF: CPT | Mod: PN

## 2021-12-01 RX ORDER — LORAZEPAM 2 MG/ML
1 INJECTION INTRAMUSCULAR
Status: COMPLETED | OUTPATIENT
Start: 2021-12-01 | End: 2021-12-01

## 2021-12-01 RX ORDER — HEPARIN 100 UNIT/ML
500 SYRINGE INTRAVENOUS
Status: DISCONTINUED | OUTPATIENT
Start: 2021-12-01 | End: 2021-12-01 | Stop reason: HOSPADM

## 2021-12-01 RX ORDER — FLUOROURACIL 50 MG/ML
400 INJECTION, SOLUTION INTRAVENOUS
Status: COMPLETED | OUTPATIENT
Start: 2021-12-01 | End: 2021-12-01

## 2021-12-01 RX ORDER — SODIUM CHLORIDE 0.9 % (FLUSH) 0.9 %
10 SYRINGE (ML) INJECTION
Status: DISCONTINUED | OUTPATIENT
Start: 2021-12-01 | End: 2021-12-01 | Stop reason: HOSPADM

## 2021-12-01 RX ADMIN — PALONOSETRON 0.25 MG: 0.05 INJECTION, SOLUTION INTRAVENOUS at 01:12

## 2021-12-01 RX ADMIN — FLUOROURACIL 990 MG: 50 INJECTION, SOLUTION INTRAVENOUS at 04:12

## 2021-12-01 RX ADMIN — SODIUM CHLORIDE: 0.9 INJECTION, SOLUTION INTRAVENOUS at 01:12

## 2021-12-01 RX ADMIN — LORAZEPAM 1 MG: 2 INJECTION INTRAMUSCULAR; INTRAVENOUS at 01:12

## 2021-12-01 RX ADMIN — FLUOROURACIL 5930 MG: 50 INJECTION, SOLUTION INTRAVENOUS at 04:12

## 2021-12-01 RX ADMIN — BEVACIZUMAB 655 MG: 400 INJECTION, SOLUTION INTRAVENOUS at 01:12

## 2021-12-01 RX ADMIN — LEUCOVORIN CALCIUM 990 MG: 350 INJECTION, POWDER, LYOPHILIZED, FOR SOLUTION INTRAMUSCULAR; INTRAVENOUS at 02:12

## 2021-12-01 RX ADMIN — SODIUM CHLORIDE 440 MG: 0.9 INJECTION, SOLUTION INTRAVENOUS at 02:12

## 2021-12-03 ENCOUNTER — INFUSION (OUTPATIENT)
Dept: INFUSION THERAPY | Facility: HOSPITAL | Age: 51
End: 2021-12-03
Attending: INTERNAL MEDICINE
Payer: COMMERCIAL

## 2021-12-03 VITALS — SYSTOLIC BLOOD PRESSURE: 143 MMHG | DIASTOLIC BLOOD PRESSURE: 93 MMHG | HEART RATE: 78 BPM

## 2021-12-03 DIAGNOSIS — C77.2 METASTASIS TO INTESTINAL LYMPH NODE: Primary | ICD-10-CM

## 2021-12-03 DIAGNOSIS — C18.7 MALIGNANT NEOPLASM OF SIGMOID COLON: ICD-10-CM

## 2021-12-03 PROCEDURE — 25000003 PHARM REV CODE 250: Mod: PN | Performed by: INTERNAL MEDICINE

## 2021-12-03 PROCEDURE — A4216 STERILE WATER/SALINE, 10 ML: HCPCS | Mod: PN | Performed by: INTERNAL MEDICINE

## 2021-12-03 PROCEDURE — 96523 IRRIG DRUG DELIVERY DEVICE: CPT | Mod: PN

## 2021-12-03 PROCEDURE — 63600175 PHARM REV CODE 636 W HCPCS: Mod: PN | Performed by: INTERNAL MEDICINE

## 2021-12-03 RX ORDER — HEPARIN 100 UNIT/ML
500 SYRINGE INTRAVENOUS
Status: DISCONTINUED | OUTPATIENT
Start: 2021-12-03 | End: 2021-12-03 | Stop reason: HOSPADM

## 2021-12-03 RX ORDER — SODIUM CHLORIDE 0.9 % (FLUSH) 0.9 %
10 SYRINGE (ML) INJECTION
Status: DISCONTINUED | OUTPATIENT
Start: 2021-12-03 | End: 2021-12-03 | Stop reason: HOSPADM

## 2021-12-03 RX ADMIN — Medication 10 ML: at 02:12

## 2021-12-03 RX ADMIN — HEPARIN 500 UNITS: 100 SYRINGE at 02:12

## 2021-12-13 ENCOUNTER — OFFICE VISIT (OUTPATIENT)
Dept: HEMATOLOGY/ONCOLOGY | Facility: CLINIC | Age: 51
End: 2021-12-13
Payer: COMMERCIAL

## 2021-12-13 ENCOUNTER — LAB VISIT (OUTPATIENT)
Dept: LAB | Facility: HOSPITAL | Age: 51
End: 2021-12-13
Attending: INTERNAL MEDICINE
Payer: COMMERCIAL

## 2021-12-13 VITALS
SYSTOLIC BLOOD PRESSURE: 133 MMHG | TEMPERATURE: 97 F | OXYGEN SATURATION: 97 % | WEIGHT: 293 LBS | HEIGHT: 66 IN | HEART RATE: 64 BPM | RESPIRATION RATE: 18 BRPM | DIASTOLIC BLOOD PRESSURE: 84 MMHG | BODY MASS INDEX: 47.09 KG/M2

## 2021-12-13 DIAGNOSIS — N20.0 RENAL STONES: ICD-10-CM

## 2021-12-13 DIAGNOSIS — K76.0 FATTY LIVER: ICD-10-CM

## 2021-12-13 DIAGNOSIS — F41.9 ANXIETY: ICD-10-CM

## 2021-12-13 DIAGNOSIS — C18.7 MALIGNANT NEOPLASM OF SIGMOID COLON: ICD-10-CM

## 2021-12-13 DIAGNOSIS — K59.09 OTHER CONSTIPATION: ICD-10-CM

## 2021-12-13 DIAGNOSIS — C18.7 MALIGNANT NEOPLASM OF SIGMOID COLON: Primary | ICD-10-CM

## 2021-12-13 DIAGNOSIS — C77.2 METASTASIS TO INTESTINAL LYMPH NODE: ICD-10-CM

## 2021-12-13 DIAGNOSIS — E66.01 MORBID OBESITY: ICD-10-CM

## 2021-12-13 LAB
ALBUMIN SERPL BCP-MCNC: 3.8 G/DL (ref 3.5–5.2)
ALP SERPL-CCNC: 162 U/L (ref 55–135)
ALT SERPL W/O P-5'-P-CCNC: 74 U/L (ref 10–44)
ANION GAP SERPL CALC-SCNC: 7 MMOL/L (ref 8–16)
AST SERPL-CCNC: 48 U/L (ref 10–40)
BASOPHILS # BLD AUTO: 0.03 K/UL (ref 0–0.2)
BASOPHILS NFR BLD: 0.8 % (ref 0–1.9)
BILIRUB SERPL-MCNC: 0.5 MG/DL (ref 0.1–1)
BUN SERPL-MCNC: 9 MG/DL (ref 6–20)
CALCIUM SERPL-MCNC: 10.9 MG/DL (ref 8.7–10.5)
CHLORIDE SERPL-SCNC: 109 MMOL/L (ref 95–110)
CO2 SERPL-SCNC: 25 MMOL/L (ref 23–29)
CREAT SERPL-MCNC: 0.9 MG/DL (ref 0.5–1.4)
DIFFERENTIAL METHOD: ABNORMAL
EOSINOPHIL # BLD AUTO: 0.3 K/UL (ref 0–0.5)
EOSINOPHIL NFR BLD: 7 % (ref 0–8)
ERYTHROCYTE [DISTWIDTH] IN BLOOD BY AUTOMATED COUNT: 15.9 % (ref 11.5–14.5)
EST. GFR  (AFRICAN AMERICAN): >60 ML/MIN/1.73 M^2
EST. GFR  (NON AFRICAN AMERICAN): >60 ML/MIN/1.73 M^2
GLUCOSE SERPL-MCNC: 99 MG/DL (ref 70–110)
HCT VFR BLD AUTO: 42.7 % (ref 37–48.5)
HGB BLD-MCNC: 13.6 G/DL (ref 12–16)
IMM GRANULOCYTES # BLD AUTO: 0.01 K/UL (ref 0–0.04)
IMM GRANULOCYTES NFR BLD AUTO: 0.3 % (ref 0–0.5)
LYMPHOCYTES # BLD AUTO: 1.5 K/UL (ref 1–4.8)
LYMPHOCYTES NFR BLD: 39.1 % (ref 18–48)
MCH RBC QN AUTO: 31.3 PG (ref 27–31)
MCHC RBC AUTO-ENTMCNC: 31.9 G/DL (ref 32–36)
MCV RBC AUTO: 98 FL (ref 82–98)
MONOCYTES # BLD AUTO: 0.5 K/UL (ref 0.3–1)
MONOCYTES NFR BLD: 14.3 % (ref 4–15)
NEUTROPHILS # BLD AUTO: 1.4 K/UL (ref 1.8–7.7)
NEUTROPHILS NFR BLD: 38.5 % (ref 38–73)
NRBC BLD-RTO: 0 /100 WBC
PLATELET # BLD AUTO: 250 K/UL (ref 150–450)
PMV BLD AUTO: 9.6 FL (ref 9.2–12.9)
POTASSIUM SERPL-SCNC: 3.9 MMOL/L (ref 3.5–5.1)
PROT SERPL-MCNC: 6.8 G/DL (ref 6–8.4)
RBC # BLD AUTO: 4.34 M/UL (ref 4–5.4)
SODIUM SERPL-SCNC: 141 MMOL/L (ref 136–145)
WBC # BLD AUTO: 3.71 K/UL (ref 3.9–12.7)

## 2021-12-13 PROCEDURE — 99214 OFFICE O/P EST MOD 30 MIN: CPT | Mod: S$GLB,,, | Performed by: NURSE PRACTITIONER

## 2021-12-13 PROCEDURE — 36415 COLL VENOUS BLD VENIPUNCTURE: CPT | Mod: PN | Performed by: INTERNAL MEDICINE

## 2021-12-13 PROCEDURE — 99214 PR OFFICE/OUTPT VISIT, EST, LEVL IV, 30-39 MIN: ICD-10-PCS | Mod: S$GLB,,, | Performed by: NURSE PRACTITIONER

## 2021-12-13 PROCEDURE — 85025 COMPLETE CBC W/AUTO DIFF WBC: CPT | Mod: PN | Performed by: INTERNAL MEDICINE

## 2021-12-13 PROCEDURE — 99999 PR PBB SHADOW E&M-EST. PATIENT-LVL IV: CPT | Mod: PBBFAC,,, | Performed by: NURSE PRACTITIONER

## 2021-12-13 PROCEDURE — 80053 COMPREHEN METABOLIC PANEL: CPT | Mod: PN | Performed by: INTERNAL MEDICINE

## 2021-12-13 PROCEDURE — 99999 PR PBB SHADOW E&M-EST. PATIENT-LVL IV: ICD-10-PCS | Mod: PBBFAC,,, | Performed by: NURSE PRACTITIONER

## 2021-12-13 RX ORDER — HEPARIN 100 UNIT/ML
500 SYRINGE INTRAVENOUS
Status: CANCELLED | OUTPATIENT
Start: 2021-12-15

## 2021-12-13 RX ORDER — SODIUM CHLORIDE 0.9 % (FLUSH) 0.9 %
10 SYRINGE (ML) INJECTION
Status: CANCELLED | OUTPATIENT
Start: 2021-12-15

## 2021-12-13 RX ORDER — FLUOROURACIL 50 MG/ML
400 INJECTION, SOLUTION INTRAVENOUS
Status: CANCELLED | OUTPATIENT
Start: 2021-12-15

## 2021-12-13 RX ORDER — SODIUM CHLORIDE 0.9 % (FLUSH) 0.9 %
10 SYRINGE (ML) INJECTION
Status: CANCELLED | OUTPATIENT
Start: 2021-12-17

## 2021-12-13 RX ORDER — ATROPINE SULFATE 0.4 MG/ML
0.4 INJECTION, SOLUTION ENDOTRACHEAL; INTRAMEDULLARY; INTRAMUSCULAR; INTRAVENOUS; SUBCUTANEOUS ONCE AS NEEDED
Status: CANCELLED | OUTPATIENT
Start: 2021-12-15

## 2021-12-13 RX ORDER — LORAZEPAM 2 MG/ML
1 INJECTION INTRAMUSCULAR
Status: CANCELLED
Start: 2021-12-15 | End: 2021-12-15

## 2021-12-13 RX ORDER — HEPARIN 100 UNIT/ML
500 SYRINGE INTRAVENOUS
Status: CANCELLED | OUTPATIENT
Start: 2021-12-17

## 2021-12-14 ENCOUNTER — PATIENT MESSAGE (OUTPATIENT)
Dept: HEMATOLOGY/ONCOLOGY | Facility: CLINIC | Age: 51
End: 2021-12-14
Payer: COMMERCIAL

## 2021-12-14 DIAGNOSIS — C18.7 MALIGNANT NEOPLASM OF SIGMOID COLON: ICD-10-CM

## 2021-12-14 DIAGNOSIS — G89.29 CHRONIC ABDOMINAL PAIN: ICD-10-CM

## 2021-12-14 DIAGNOSIS — R10.9 CHRONIC ABDOMINAL PAIN: ICD-10-CM

## 2021-12-14 DIAGNOSIS — M62.838 NIGHT MUSCLE SPASMS: ICD-10-CM

## 2021-12-14 RX ORDER — CYCLOBENZAPRINE HCL 10 MG
10 TABLET ORAL NIGHTLY
Qty: 30 TABLET | Refills: 0 | OUTPATIENT
Start: 2021-12-14

## 2021-12-15 ENCOUNTER — OFFICE VISIT (OUTPATIENT)
Dept: PSYCHIATRY | Facility: CLINIC | Age: 51
End: 2021-12-15
Payer: COMMERCIAL

## 2021-12-15 ENCOUNTER — INFUSION (OUTPATIENT)
Dept: INFUSION THERAPY | Facility: HOSPITAL | Age: 51
End: 2021-12-15
Attending: INTERNAL MEDICINE
Payer: COMMERCIAL

## 2021-12-15 VITALS
BODY MASS INDEX: 47.09 KG/M2 | WEIGHT: 293 LBS | HEIGHT: 66 IN | RESPIRATION RATE: 16 BRPM | HEART RATE: 93 BPM | TEMPERATURE: 98 F | SYSTOLIC BLOOD PRESSURE: 144 MMHG | DIASTOLIC BLOOD PRESSURE: 92 MMHG

## 2021-12-15 DIAGNOSIS — F33.1 MODERATE EPISODE OF RECURRENT MAJOR DEPRESSIVE DISORDER: Primary | ICD-10-CM

## 2021-12-15 DIAGNOSIS — C18.7 MALIGNANT NEOPLASM OF SIGMOID COLON: ICD-10-CM

## 2021-12-15 DIAGNOSIS — C80.1 ANXIETY ASSOCIATED WITH CANCER DIAGNOSIS: ICD-10-CM

## 2021-12-15 DIAGNOSIS — M62.838 NIGHT MUSCLE SPASMS: ICD-10-CM

## 2021-12-15 DIAGNOSIS — F41.1 ANXIETY ASSOCIATED WITH CANCER DIAGNOSIS: ICD-10-CM

## 2021-12-15 DIAGNOSIS — C77.2 METASTASIS TO INTESTINAL LYMPH NODE: Primary | ICD-10-CM

## 2021-12-15 PROCEDURE — 96366 THER/PROPH/DIAG IV INF ADDON: CPT | Mod: PN

## 2021-12-15 PROCEDURE — 96415 CHEMO IV INFUSION ADDL HR: CPT | Mod: PN

## 2021-12-15 PROCEDURE — 90834 PSYTX W PT 45 MINUTES: CPT | Mod: S$GLB,,, | Performed by: PSYCHOLOGIST

## 2021-12-15 PROCEDURE — 96368 THER/DIAG CONCURRENT INF: CPT | Mod: PN

## 2021-12-15 PROCEDURE — 96413 CHEMO IV INFUSION 1 HR: CPT | Mod: PN

## 2021-12-15 PROCEDURE — 96375 TX/PRO/DX INJ NEW DRUG ADDON: CPT | Mod: PN

## 2021-12-15 PROCEDURE — 90834 PR PSYCHOTHERAPY W/PATIENT, 45 MIN: ICD-10-PCS | Mod: S$GLB,,, | Performed by: PSYCHOLOGIST

## 2021-12-15 PROCEDURE — 96416 CHEMO PROLONG INFUSE W/PUMP: CPT | Mod: PN

## 2021-12-15 PROCEDURE — 96417 CHEMO IV INFUS EACH ADDL SEQ: CPT | Mod: PN

## 2021-12-15 PROCEDURE — 96411 CHEMO IV PUSH ADDL DRUG: CPT | Mod: PN

## 2021-12-15 PROCEDURE — 63600175 PHARM REV CODE 636 W HCPCS: Mod: PN | Performed by: NURSE PRACTITIONER

## 2021-12-15 PROCEDURE — 25000003 PHARM REV CODE 250: Mod: PN | Performed by: NURSE PRACTITIONER

## 2021-12-15 PROCEDURE — 96367 TX/PROPH/DG ADDL SEQ IV INF: CPT | Mod: PN

## 2021-12-15 RX ORDER — SODIUM CHLORIDE 0.9 % (FLUSH) 0.9 %
10 SYRINGE (ML) INJECTION
Status: DISCONTINUED | OUTPATIENT
Start: 2021-12-15 | End: 2021-12-15 | Stop reason: HOSPADM

## 2021-12-15 RX ORDER — LORAZEPAM 2 MG/ML
1 INJECTION INTRAMUSCULAR
Status: COMPLETED | OUTPATIENT
Start: 2021-12-15 | End: 2021-12-15

## 2021-12-15 RX ORDER — BUSPIRONE HYDROCHLORIDE 10 MG/1
10 TABLET ORAL 2 TIMES DAILY
Qty: 180 TABLET | Refills: 0 | Status: ON HOLD | OUTPATIENT
Start: 2021-12-15 | End: 2023-05-07

## 2021-12-15 RX ORDER — CYCLOBENZAPRINE HCL 10 MG
10 TABLET ORAL NIGHTLY
Qty: 30 TABLET | Refills: 0 | Status: SHIPPED | OUTPATIENT
Start: 2021-12-15 | End: 2022-08-24

## 2021-12-15 RX ORDER — HEPARIN 100 UNIT/ML
500 SYRINGE INTRAVENOUS
Status: DISCONTINUED | OUTPATIENT
Start: 2021-12-15 | End: 2021-12-15 | Stop reason: HOSPADM

## 2021-12-15 RX ORDER — FLUOROURACIL 50 MG/ML
400 INJECTION, SOLUTION INTRAVENOUS
Status: COMPLETED | OUTPATIENT
Start: 2021-12-15 | End: 2021-12-15

## 2021-12-15 RX ORDER — ATROPINE SULFATE 0.4 MG/ML
0.4 INJECTION, SOLUTION ENDOTRACHEAL; INTRAMEDULLARY; INTRAMUSCULAR; INTRAVENOUS; SUBCUTANEOUS ONCE AS NEEDED
Status: DISCONTINUED | OUTPATIENT
Start: 2021-12-15 | End: 2021-12-15 | Stop reason: HOSPADM

## 2021-12-15 RX ORDER — TRAMADOL HYDROCHLORIDE 50 MG/1
50 TABLET ORAL 3 TIMES DAILY PRN
Qty: 90 TABLET | Refills: 0 | Status: SHIPPED | OUTPATIENT
Start: 2021-12-15 | End: 2022-11-29 | Stop reason: SDUPTHER

## 2021-12-15 RX ADMIN — LEUCOVORIN CALCIUM 990 MG: 350 INJECTION, POWDER, LYOPHILIZED, FOR SOLUTION INTRAMUSCULAR; INTRAVENOUS at 02:12

## 2021-12-15 RX ADMIN — BEVACIZUMAB 600 MG: 400 INJECTION, SOLUTION INTRAVENOUS at 01:12

## 2021-12-15 RX ADMIN — SODIUM CHLORIDE 440 MG: 0.9 INJECTION, SOLUTION INTRAVENOUS at 02:12

## 2021-12-15 RX ADMIN — FLUOROURACIL 990 MG: 50 INJECTION, SOLUTION INTRAVENOUS at 04:12

## 2021-12-15 RX ADMIN — FLUOROURACIL 5930 MG: 50 INJECTION, SOLUTION INTRAVENOUS at 04:12

## 2021-12-15 RX ADMIN — LORAZEPAM 1 MG: 2 INJECTION INTRAMUSCULAR; INTRAVENOUS at 01:12

## 2021-12-15 RX ADMIN — PROMETHAZINE HYDROCHLORIDE 6.25 MG: 25 INJECTION INTRAMUSCULAR; INTRAVENOUS at 03:12

## 2021-12-15 RX ADMIN — SODIUM CHLORIDE: 0.9 INJECTION, SOLUTION INTRAVENOUS at 12:12

## 2021-12-15 RX ADMIN — PALONOSETRON 0.25 MG: 0.05 INJECTION, SOLUTION INTRAVENOUS at 01:12

## 2021-12-17 ENCOUNTER — INFUSION (OUTPATIENT)
Dept: INFUSION THERAPY | Facility: HOSPITAL | Age: 51
End: 2021-12-17
Attending: INTERNAL MEDICINE
Payer: COMMERCIAL

## 2021-12-17 VITALS
TEMPERATURE: 98 F | RESPIRATION RATE: 16 BRPM | DIASTOLIC BLOOD PRESSURE: 94 MMHG | HEART RATE: 80 BPM | SYSTOLIC BLOOD PRESSURE: 156 MMHG

## 2021-12-17 DIAGNOSIS — C77.2 METASTASIS TO INTESTINAL LYMPH NODE: Primary | ICD-10-CM

## 2021-12-17 DIAGNOSIS — C18.7 MALIGNANT NEOPLASM OF SIGMOID COLON: ICD-10-CM

## 2021-12-17 PROCEDURE — A4216 STERILE WATER/SALINE, 10 ML: HCPCS | Mod: PN | Performed by: NURSE PRACTITIONER

## 2021-12-17 PROCEDURE — 96523 IRRIG DRUG DELIVERY DEVICE: CPT | Mod: PN

## 2021-12-17 PROCEDURE — 63600175 PHARM REV CODE 636 W HCPCS: Mod: PN | Performed by: NURSE PRACTITIONER

## 2021-12-17 PROCEDURE — 25000003 PHARM REV CODE 250: Mod: PN | Performed by: NURSE PRACTITIONER

## 2021-12-17 RX ORDER — SODIUM CHLORIDE 0.9 % (FLUSH) 0.9 %
10 SYRINGE (ML) INJECTION
Status: DISCONTINUED | OUTPATIENT
Start: 2021-12-17 | End: 2021-12-17 | Stop reason: HOSPADM

## 2021-12-17 RX ORDER — HEPARIN 100 UNIT/ML
500 SYRINGE INTRAVENOUS
Status: DISCONTINUED | OUTPATIENT
Start: 2021-12-17 | End: 2021-12-17 | Stop reason: HOSPADM

## 2021-12-17 RX ADMIN — HEPARIN 500 UNITS: 100 SYRINGE at 02:12

## 2021-12-17 RX ADMIN — Medication 10 ML: at 02:12

## 2021-12-20 ENCOUNTER — PATIENT MESSAGE (OUTPATIENT)
Dept: HEMATOLOGY/ONCOLOGY | Facility: CLINIC | Age: 51
End: 2021-12-20
Payer: COMMERCIAL

## 2021-12-23 ENCOUNTER — OFFICE VISIT (OUTPATIENT)
Dept: HEMATOLOGY/ONCOLOGY | Facility: CLINIC | Age: 51
End: 2021-12-23
Payer: COMMERCIAL

## 2021-12-23 ENCOUNTER — LAB VISIT (OUTPATIENT)
Dept: LAB | Facility: HOSPITAL | Age: 51
End: 2021-12-23
Attending: INTERNAL MEDICINE
Payer: COMMERCIAL

## 2021-12-23 VITALS
DIASTOLIC BLOOD PRESSURE: 90 MMHG | SYSTOLIC BLOOD PRESSURE: 138 MMHG | HEIGHT: 66 IN | RESPIRATION RATE: 18 BRPM | OXYGEN SATURATION: 97 % | BODY MASS INDEX: 47.09 KG/M2 | HEART RATE: 77 BPM | WEIGHT: 293 LBS | TEMPERATURE: 98 F

## 2021-12-23 DIAGNOSIS — C18.7 MALIGNANT NEOPLASM OF SIGMOID COLON: ICD-10-CM

## 2021-12-23 DIAGNOSIS — C77.9 SECONDARY ADENOCARCINOMA OF LYMPH NODE: ICD-10-CM

## 2021-12-23 DIAGNOSIS — F41.9 ANXIETY: ICD-10-CM

## 2021-12-23 DIAGNOSIS — C18.7 MALIGNANT NEOPLASM OF SIGMOID COLON: Primary | ICD-10-CM

## 2021-12-23 DIAGNOSIS — R74.01 TRANSAMINITIS: ICD-10-CM

## 2021-12-23 DIAGNOSIS — E83.52 HYPERCALCEMIA: ICD-10-CM

## 2021-12-23 DIAGNOSIS — C77.2 METASTASIS TO INTESTINAL LYMPH NODE: ICD-10-CM

## 2021-12-23 DIAGNOSIS — R11.0 NAUSEA: ICD-10-CM

## 2021-12-23 DIAGNOSIS — N20.0 RENAL STONES: ICD-10-CM

## 2021-12-23 DIAGNOSIS — R30.0 DYSURIA: ICD-10-CM

## 2021-12-23 LAB
ALBUMIN SERPL BCP-MCNC: 4 G/DL (ref 3.5–5.2)
ALP SERPL-CCNC: 165 U/L (ref 55–135)
ALT SERPL W/O P-5'-P-CCNC: 80 U/L (ref 10–44)
ANION GAP SERPL CALC-SCNC: 9 MMOL/L (ref 8–16)
AST SERPL-CCNC: 48 U/L (ref 10–40)
BASOPHILS # BLD AUTO: 0.05 K/UL (ref 0–0.2)
BASOPHILS NFR BLD: 1.3 % (ref 0–1.9)
BILIRUB SERPL-MCNC: 0.4 MG/DL (ref 0.1–1)
BUN SERPL-MCNC: 11 MG/DL (ref 6–20)
CALCIUM SERPL-MCNC: 9.8 MG/DL (ref 8.7–10.5)
CHLORIDE SERPL-SCNC: 108 MMOL/L (ref 95–110)
CO2 SERPL-SCNC: 23 MMOL/L (ref 23–29)
CREAT SERPL-MCNC: 0.9 MG/DL (ref 0.5–1.4)
DIFFERENTIAL METHOD: ABNORMAL
EOSINOPHIL # BLD AUTO: 0.5 K/UL (ref 0–0.5)
EOSINOPHIL NFR BLD: 12.7 % (ref 0–8)
ERYTHROCYTE [DISTWIDTH] IN BLOOD BY AUTOMATED COUNT: 14.9 % (ref 11.5–14.5)
EST. GFR  (AFRICAN AMERICAN): >60 ML/MIN/1.73 M^2
EST. GFR  (NON AFRICAN AMERICAN): >60 ML/MIN/1.73 M^2
GLUCOSE SERPL-MCNC: 101 MG/DL (ref 70–110)
HCT VFR BLD AUTO: 43.3 % (ref 37–48.5)
HGB BLD-MCNC: 14.1 G/DL (ref 12–16)
IMM GRANULOCYTES # BLD AUTO: 0.04 K/UL (ref 0–0.04)
IMM GRANULOCYTES NFR BLD AUTO: 1 % (ref 0–0.5)
LYMPHOCYTES # BLD AUTO: 1.7 K/UL (ref 1–4.8)
LYMPHOCYTES NFR BLD: 42.5 % (ref 18–48)
MAGNESIUM SERPL-MCNC: 2.1 MG/DL (ref 1.6–2.6)
MCH RBC QN AUTO: 31.4 PG (ref 27–31)
MCHC RBC AUTO-ENTMCNC: 32.6 G/DL (ref 32–36)
MCV RBC AUTO: 96 FL (ref 82–98)
MONOCYTES # BLD AUTO: 0.5 K/UL (ref 0.3–1)
MONOCYTES NFR BLD: 12.2 % (ref 4–15)
NEUTROPHILS # BLD AUTO: 1.2 K/UL (ref 1.8–7.7)
NEUTROPHILS NFR BLD: 30.3 % (ref 38–73)
NRBC BLD-RTO: 0 /100 WBC
PLATELET # BLD AUTO: 255 K/UL (ref 150–450)
PMV BLD AUTO: 9.8 FL (ref 9.2–12.9)
POTASSIUM SERPL-SCNC: 4 MMOL/L (ref 3.5–5.1)
PROT SERPL-MCNC: 6.4 G/DL (ref 6–8.4)
RBC # BLD AUTO: 4.49 M/UL (ref 4–5.4)
SODIUM SERPL-SCNC: 140 MMOL/L (ref 136–145)
WBC # BLD AUTO: 3.93 K/UL (ref 3.9–12.7)

## 2021-12-23 PROCEDURE — 3008F BODY MASS INDEX DOCD: CPT | Mod: CPTII,S$GLB,, | Performed by: INTERNAL MEDICINE

## 2021-12-23 PROCEDURE — 1159F PR MEDICATION LIST DOCUMENTED IN MEDICAL RECORD: ICD-10-PCS | Mod: CPTII,S$GLB,, | Performed by: INTERNAL MEDICINE

## 2021-12-23 PROCEDURE — 99999 PR PBB SHADOW E&M-EST. PATIENT-LVL IV: CPT | Mod: PBBFAC,,, | Performed by: INTERNAL MEDICINE

## 2021-12-23 PROCEDURE — 3075F SYST BP GE 130 - 139MM HG: CPT | Mod: CPTII,S$GLB,, | Performed by: INTERNAL MEDICINE

## 2021-12-23 PROCEDURE — 3075F PR MOST RECENT SYSTOLIC BLOOD PRESS GE 130-139MM HG: ICD-10-PCS | Mod: CPTII,S$GLB,, | Performed by: INTERNAL MEDICINE

## 2021-12-23 PROCEDURE — 1159F MED LIST DOCD IN RCRD: CPT | Mod: CPTII,S$GLB,, | Performed by: INTERNAL MEDICINE

## 2021-12-23 PROCEDURE — 3080F PR MOST RECENT DIASTOLIC BLOOD PRESSURE >= 90 MM HG: ICD-10-PCS | Mod: CPTII,S$GLB,, | Performed by: INTERNAL MEDICINE

## 2021-12-23 PROCEDURE — 3008F PR BODY MASS INDEX (BMI) DOCUMENTED: ICD-10-PCS | Mod: CPTII,S$GLB,, | Performed by: INTERNAL MEDICINE

## 2021-12-23 PROCEDURE — 99215 OFFICE O/P EST HI 40 MIN: CPT | Mod: S$GLB,,, | Performed by: INTERNAL MEDICINE

## 2021-12-23 PROCEDURE — 1160F PR REVIEW ALL MEDS BY PRESCRIBER/CLIN PHARMACIST DOCUMENTED: ICD-10-PCS | Mod: CPTII,S$GLB,, | Performed by: INTERNAL MEDICINE

## 2021-12-23 PROCEDURE — 99999 PR PBB SHADOW E&M-EST. PATIENT-LVL IV: ICD-10-PCS | Mod: PBBFAC,,, | Performed by: INTERNAL MEDICINE

## 2021-12-23 PROCEDURE — 3080F DIAST BP >= 90 MM HG: CPT | Mod: CPTII,S$GLB,, | Performed by: INTERNAL MEDICINE

## 2021-12-23 PROCEDURE — 80053 COMPREHEN METABOLIC PANEL: CPT | Mod: PO | Performed by: NURSE PRACTITIONER

## 2021-12-23 PROCEDURE — 1160F RVW MEDS BY RX/DR IN RCRD: CPT | Mod: CPTII,S$GLB,, | Performed by: INTERNAL MEDICINE

## 2021-12-23 PROCEDURE — 85025 COMPLETE CBC W/AUTO DIFF WBC: CPT | Mod: PN | Performed by: NURSE PRACTITIONER

## 2021-12-23 PROCEDURE — 99215 PR OFFICE/OUTPT VISIT, EST, LEVL V, 40-54 MIN: ICD-10-PCS | Mod: S$GLB,,, | Performed by: INTERNAL MEDICINE

## 2021-12-23 PROCEDURE — 36415 COLL VENOUS BLD VENIPUNCTURE: CPT | Mod: PN | Performed by: NURSE PRACTITIONER

## 2021-12-23 PROCEDURE — 83735 ASSAY OF MAGNESIUM: CPT | Mod: PO | Performed by: NURSE PRACTITIONER

## 2021-12-27 RX ORDER — FLUOROURACIL 50 MG/ML
400 INJECTION, SOLUTION INTRAVENOUS
Status: CANCELLED | OUTPATIENT
Start: 2021-12-28

## 2021-12-27 RX ORDER — SODIUM CHLORIDE 0.9 % (FLUSH) 0.9 %
10 SYRINGE (ML) INJECTION
Status: CANCELLED | OUTPATIENT
Start: 2021-12-28

## 2021-12-27 RX ORDER — ATROPINE SULFATE 0.4 MG/ML
0.4 INJECTION, SOLUTION ENDOTRACHEAL; INTRAMEDULLARY; INTRAMUSCULAR; INTRAVENOUS; SUBCUTANEOUS ONCE AS NEEDED
Status: CANCELLED | OUTPATIENT
Start: 2021-12-28

## 2021-12-27 RX ORDER — HEPARIN 100 UNIT/ML
500 SYRINGE INTRAVENOUS
Status: CANCELLED | OUTPATIENT
Start: 2021-12-28

## 2021-12-27 RX ORDER — SODIUM CHLORIDE 0.9 % (FLUSH) 0.9 %
10 SYRINGE (ML) INJECTION
Status: CANCELLED | OUTPATIENT
Start: 2021-12-30

## 2021-12-27 RX ORDER — LORAZEPAM 2 MG/ML
1 INJECTION INTRAMUSCULAR
Status: CANCELLED
Start: 2021-12-28 | End: 2021-12-28

## 2021-12-27 RX ORDER — HEPARIN 100 UNIT/ML
500 SYRINGE INTRAVENOUS
Status: CANCELLED | OUTPATIENT
Start: 2021-12-30

## 2021-12-28 ENCOUNTER — INFUSION (OUTPATIENT)
Dept: INFUSION THERAPY | Facility: HOSPITAL | Age: 51
End: 2021-12-28
Attending: INTERNAL MEDICINE
Payer: COMMERCIAL

## 2021-12-28 ENCOUNTER — DOCUMENTATION ONLY (OUTPATIENT)
Dept: INFUSION THERAPY | Facility: HOSPITAL | Age: 51
End: 2021-12-28
Payer: COMMERCIAL

## 2021-12-28 VITALS
DIASTOLIC BLOOD PRESSURE: 86 MMHG | RESPIRATION RATE: 18 BRPM | HEIGHT: 66 IN | WEIGHT: 293 LBS | TEMPERATURE: 98 F | SYSTOLIC BLOOD PRESSURE: 160 MMHG | HEART RATE: 91 BPM | BODY MASS INDEX: 47.09 KG/M2

## 2021-12-28 DIAGNOSIS — C77.2 METASTASIS TO INTESTINAL LYMPH NODE: Primary | ICD-10-CM

## 2021-12-28 DIAGNOSIS — C18.7 MALIGNANT NEOPLASM OF SIGMOID COLON: ICD-10-CM

## 2021-12-28 PROCEDURE — 96367 TX/PROPH/DG ADDL SEQ IV INF: CPT | Mod: PN

## 2021-12-28 PROCEDURE — 96415 CHEMO IV INFUSION ADDL HR: CPT | Mod: PN

## 2021-12-28 PROCEDURE — 96411 CHEMO IV PUSH ADDL DRUG: CPT | Mod: PN

## 2021-12-28 PROCEDURE — 96365 THER/PROPH/DIAG IV INF INIT: CPT | Mod: PN

## 2021-12-28 PROCEDURE — 96375 TX/PRO/DX INJ NEW DRUG ADDON: CPT | Mod: PN

## 2021-12-28 PROCEDURE — 25000003 PHARM REV CODE 250: Mod: PN | Performed by: INTERNAL MEDICINE

## 2021-12-28 PROCEDURE — 96416 CHEMO PROLONG INFUSE W/PUMP: CPT | Mod: PN

## 2021-12-28 PROCEDURE — 96417 CHEMO IV INFUS EACH ADDL SEQ: CPT | Mod: PN

## 2021-12-28 PROCEDURE — 96413 CHEMO IV INFUSION 1 HR: CPT | Mod: PN

## 2021-12-28 PROCEDURE — 96368 THER/DIAG CONCURRENT INF: CPT | Mod: PN

## 2021-12-28 PROCEDURE — 63600175 PHARM REV CODE 636 W HCPCS: Mod: PN | Performed by: INTERNAL MEDICINE

## 2021-12-28 RX ORDER — SODIUM CHLORIDE 0.9 % (FLUSH) 0.9 %
10 SYRINGE (ML) INJECTION
Status: DISCONTINUED | OUTPATIENT
Start: 2021-12-28 | End: 2021-12-28 | Stop reason: HOSPADM

## 2021-12-28 RX ORDER — FLUOROURACIL 50 MG/ML
400 INJECTION, SOLUTION INTRAVENOUS
Status: COMPLETED | OUTPATIENT
Start: 2021-12-28 | End: 2021-12-28

## 2021-12-28 RX ORDER — ATROPINE SULFATE 0.4 MG/ML
0.4 INJECTION, SOLUTION ENDOTRACHEAL; INTRAMEDULLARY; INTRAMUSCULAR; INTRAVENOUS; SUBCUTANEOUS ONCE AS NEEDED
Status: COMPLETED | OUTPATIENT
Start: 2021-12-28 | End: 2021-12-28

## 2021-12-28 RX ORDER — LORAZEPAM 2 MG/ML
1 INJECTION INTRAMUSCULAR
Status: COMPLETED | OUTPATIENT
Start: 2021-12-28 | End: 2021-12-28

## 2021-12-28 RX ADMIN — SODIUM CHLORIDE: 0.9 INJECTION, SOLUTION INTRAVENOUS at 12:12

## 2021-12-28 RX ADMIN — ATROPINE SULFATE 0.4 MG: 0.4 INJECTION, SOLUTION INTRAMUSCULAR; INTRAVENOUS; SUBCUTANEOUS at 02:12

## 2021-12-28 RX ADMIN — BEVACIZUMAB 600 MG: 400 INJECTION, SOLUTION INTRAVENOUS at 01:12

## 2021-12-28 RX ADMIN — LEUCOVORIN CALCIUM 990 MG: 350 INJECTION, POWDER, LYOPHILIZED, FOR SOLUTION INTRAMUSCULAR; INTRAVENOUS at 02:12

## 2021-12-28 RX ADMIN — FLUOROURACIL 5930 MG: 50 INJECTION, SOLUTION INTRAVENOUS at 04:12

## 2021-12-28 RX ADMIN — PALONOSETRON: 0.05 INJECTION, SOLUTION INTRAVENOUS at 12:12

## 2021-12-28 RX ADMIN — LORAZEPAM 1 MG: 2 INJECTION INTRAMUSCULAR; INTRAVENOUS at 12:12

## 2021-12-28 RX ADMIN — FLUOROURACIL 990 MG: 50 INJECTION, SOLUTION INTRAVENOUS at 04:12

## 2021-12-28 RX ADMIN — PROMETHAZINE HYDROCHLORIDE 6.25 MG: 25 INJECTION INTRAMUSCULAR; INTRAVENOUS at 01:12

## 2021-12-28 RX ADMIN — SODIUM CHLORIDE 440 MG: 0.9 INJECTION, SOLUTION INTRAVENOUS at 02:12

## 2021-12-30 ENCOUNTER — INFUSION (OUTPATIENT)
Dept: INFUSION THERAPY | Facility: HOSPITAL | Age: 51
End: 2021-12-30
Attending: INTERNAL MEDICINE
Payer: COMMERCIAL

## 2021-12-30 VITALS
DIASTOLIC BLOOD PRESSURE: 86 MMHG | HEART RATE: 82 BPM | TEMPERATURE: 98 F | RESPIRATION RATE: 18 BRPM | SYSTOLIC BLOOD PRESSURE: 128 MMHG

## 2021-12-30 DIAGNOSIS — C77.2 METASTASIS TO INTESTINAL LYMPH NODE: Primary | ICD-10-CM

## 2021-12-30 DIAGNOSIS — C18.7 MALIGNANT NEOPLASM OF SIGMOID COLON: ICD-10-CM

## 2021-12-30 PROCEDURE — A4216 STERILE WATER/SALINE, 10 ML: HCPCS | Mod: PN | Performed by: INTERNAL MEDICINE

## 2021-12-30 PROCEDURE — 96523 IRRIG DRUG DELIVERY DEVICE: CPT | Mod: PN

## 2021-12-30 PROCEDURE — 25000003 PHARM REV CODE 250: Mod: PN | Performed by: INTERNAL MEDICINE

## 2021-12-30 RX ORDER — SODIUM CHLORIDE 0.9 % (FLUSH) 0.9 %
10 SYRINGE (ML) INJECTION
Status: DISCONTINUED | OUTPATIENT
Start: 2021-12-30 | End: 2021-12-30 | Stop reason: HOSPADM

## 2021-12-30 RX ADMIN — Medication 10 ML: at 02:12

## 2022-01-06 ENCOUNTER — HOSPITAL ENCOUNTER (OUTPATIENT)
Dept: RADIOLOGY | Facility: HOSPITAL | Age: 52
Discharge: HOME | End: 2022-01-06
Attending: INTERNAL MEDICINE
Payer: COMMERCIAL

## 2022-01-06 DIAGNOSIS — C18.7 MALIGNANT NEOPLASM OF SIGMOID COLON: ICD-10-CM

## 2022-01-06 LAB — GLUCOSE SERPL-MCNC: 125 MG/DL (ref 70–110)

## 2022-01-06 PROCEDURE — 78815 PET IMAGE W/CT SKULL-THIGH: CPT | Mod: TC,PN

## 2022-01-06 PROCEDURE — 78815 PET IMAGE W/CT SKULL-THIGH: CPT | Mod: 26,PS,, | Performed by: RADIOLOGY

## 2022-01-06 PROCEDURE — 78815 NM PET CT ROUTINE: ICD-10-PCS | Mod: 26,PS,, | Performed by: RADIOLOGY

## 2022-01-06 NOTE — PROGRESS NOTES
PET Imaging Questionnaire    1. Are you a Diabetic? Recent Blood Sugar level? No    2. Are you anemic? Bone Marrow Stimulation Meds? No    3. Have you had a CT Scan, if so when & where was your last one? Yes - 21    4. Have you had a PET Scan, if so when & where was your last one? Yes - 21    5. Chemotherapy or currently on Chemotherapy? Yes    6. Radiation therapy? No    Surgical History:   Past Surgical History:   Procedure Laterality Date     SECTION, CLASSIC      x3    COLONOSCOPY N/A 2020    Procedure: COLONOSCOPY;  Surgeon: Shane Parker MD;  [facility];  Service: Endoscopy;  Laterality: N/A;    CYSTOSCOPY W/ URETERAL STENT PLACEMENT Bilateral 3/25/2020    Procedure: CYSTOSCOPY, WITH URETERAL STENT INSERTION;  Surgeon: Claudio Tyson MD;  Location: [facility] OR [address] [place] [address];  Service: Urology;  Laterality: Bilateral;    INSERTION OF TUNNELED CENTRAL VENOUS CATHETER (CVC) WITH SUBCUTANEOUS PORT N/A 2020    Procedure: [id number];  Surgeon: [facility] Diagnostic Provider;  Location: [facility] OR [address] [place] [address];  Service: Radiology;  Laterality: N/A;  Room 189/[facility]    LAPAROSCOPIC SIGMOIDECTOMY N/A 3/25/2020    Procedure: COLECTOMY, SIGMOID, LAPAROSCOPIC, flex sig, ERAS high;  Surgeon: Silvio Man MD;  Location: [facility] OR [address] [place] [address];  Service: Colon and Rectal;  Laterality: N/A;    MOBILIZATION OF SPLENIC FLEXURE  3/25/2020    Procedure: MOBILIZATION, SPLENIC FLEXURE;  Surgeon: Silvio Man MD;  [facility];  Service: Colon and Rectal;;    tonsillectomy      TOTAL ABDOMINAL HYSTERECTOMY W/ BILATERAL SALPINGOOPHORECTOMY N/A 3/25/2020    Procedure: HYSTERECTOMY, TOTAL, ABDOMINAL, WITH BILATERAL SALPINGO-OOPHORECTOMY;  Surgeon: Keron Brady MD;  Location: [facility] OR [address] [place] [address];  Service: Oncology;  Laterality: N/A;   7.      8. Have you been fasting for at least 6 hours? Yes    9. Is there any chance you may be pregnant or breastfeeding? No    Assay:  11.14 MCi@:9:16   Injection Site:RT AC    Residual: .481 mCi@: 9:18   Technologist: Flaquito Box Injected:10.66mCi

## 2022-01-10 ENCOUNTER — OFFICE VISIT (OUTPATIENT)
Dept: HEMATOLOGY/ONCOLOGY | Facility: CLINIC | Age: 52
End: 2022-01-10
Payer: COMMERCIAL

## 2022-01-10 ENCOUNTER — LAB VISIT (OUTPATIENT)
Dept: LAB | Facility: HOSPITAL | Age: 52
End: 2022-01-10
Attending: INTERNAL MEDICINE
Payer: COMMERCIAL

## 2022-01-10 VITALS
OXYGEN SATURATION: 97 % | TEMPERATURE: 97 F | HEIGHT: 66 IN | BODY MASS INDEX: 47.09 KG/M2 | DIASTOLIC BLOOD PRESSURE: 89 MMHG | HEART RATE: 81 BPM | WEIGHT: 293 LBS | RESPIRATION RATE: 18 BRPM | SYSTOLIC BLOOD PRESSURE: 130 MMHG

## 2022-01-10 DIAGNOSIS — C18.7 MALIGNANT NEOPLASM OF SIGMOID COLON: Primary | ICD-10-CM

## 2022-01-10 DIAGNOSIS — T45.1X5A CHEMOTHERAPY-INDUCED NEUTROPENIA: ICD-10-CM

## 2022-01-10 DIAGNOSIS — C77.2 METASTASIS TO INTESTINAL LYMPH NODE: ICD-10-CM

## 2022-01-10 DIAGNOSIS — E83.52 HYPERCALCEMIA: ICD-10-CM

## 2022-01-10 DIAGNOSIS — K76.0 FATTY LIVER: ICD-10-CM

## 2022-01-10 DIAGNOSIS — D70.1 CHEMOTHERAPY-INDUCED NEUTROPENIA: ICD-10-CM

## 2022-01-10 DIAGNOSIS — N20.0 RENAL STONES: ICD-10-CM

## 2022-01-10 DIAGNOSIS — K59.09 OTHER CONSTIPATION: ICD-10-CM

## 2022-01-10 DIAGNOSIS — F34.1 DYSTHYMIA: ICD-10-CM

## 2022-01-10 DIAGNOSIS — R74.01 TRANSAMINITIS: ICD-10-CM

## 2022-01-10 DIAGNOSIS — C18.7 MALIGNANT NEOPLASM OF SIGMOID COLON: ICD-10-CM

## 2022-01-10 DIAGNOSIS — R11.0 NAUSEA: ICD-10-CM

## 2022-01-10 DIAGNOSIS — C77.9 SECONDARY ADENOCARCINOMA OF LYMPH NODE: ICD-10-CM

## 2022-01-10 LAB
ALBUMIN SERPL BCP-MCNC: 4 G/DL (ref 3.5–5.2)
ALP SERPL-CCNC: 155 U/L (ref 55–135)
ALT SERPL W/O P-5'-P-CCNC: 69 U/L (ref 10–44)
ANION GAP SERPL CALC-SCNC: 7 MMOL/L (ref 8–16)
ANISOCYTOSIS BLD QL SMEAR: SLIGHT
AST SERPL-CCNC: 48 U/L (ref 10–40)
BASOPHILS # BLD AUTO: 0.03 K/UL (ref 0–0.2)
BASOPHILS NFR BLD: 0.9 % (ref 0–1.9)
BILIRUB SERPL-MCNC: 0.4 MG/DL (ref 0.1–1)
BILIRUB UR QL STRIP: NEGATIVE
BUN SERPL-MCNC: 10 MG/DL (ref 6–20)
CALCIUM SERPL-MCNC: 11 MG/DL (ref 8.7–10.5)
CHLORIDE SERPL-SCNC: 109 MMOL/L (ref 95–110)
CLARITY UR: CLEAR
CO2 SERPL-SCNC: 25 MMOL/L (ref 23–29)
COLOR UR: YELLOW
CREAT SERPL-MCNC: 0.8 MG/DL (ref 0.5–1.4)
DIFFERENTIAL METHOD: ABNORMAL
EOSINOPHIL # BLD AUTO: 0.2 K/UL (ref 0–0.5)
EOSINOPHIL NFR BLD: 4.8 % (ref 0–8)
ERYTHROCYTE [DISTWIDTH] IN BLOOD BY AUTOMATED COUNT: 15.7 % (ref 11.5–14.5)
EST. GFR  (AFRICAN AMERICAN): >60 ML/MIN/1.73 M^2
EST. GFR  (NON AFRICAN AMERICAN): >60 ML/MIN/1.73 M^2
GLUCOSE SERPL-MCNC: 112 MG/DL (ref 70–110)
GLUCOSE UR QL STRIP: NEGATIVE
HCT VFR BLD AUTO: 44.4 % (ref 37–48.5)
HGB BLD-MCNC: 14.5 G/DL (ref 12–16)
HGB UR QL STRIP: NEGATIVE
IMM GRANULOCYTES # BLD AUTO: 0.01 K/UL (ref 0–0.04)
IMM GRANULOCYTES NFR BLD AUTO: 0.3 % (ref 0–0.5)
KETONES UR QL STRIP: NEGATIVE
LEUKOCYTE ESTERASE UR QL STRIP: NEGATIVE
LYMPHOCYTES # BLD AUTO: 1.7 K/UL (ref 1–4.8)
LYMPHOCYTES NFR BLD: 50.6 % (ref 18–48)
MAGNESIUM SERPL-MCNC: 2.1 MG/DL (ref 1.6–2.6)
MCH RBC QN AUTO: 31.7 PG (ref 27–31)
MCHC RBC AUTO-ENTMCNC: 32.7 G/DL (ref 32–36)
MCV RBC AUTO: 97 FL (ref 82–98)
MONOCYTES # BLD AUTO: 0.7 K/UL (ref 0.3–1)
MONOCYTES NFR BLD: 22.1 % (ref 4–15)
NEUTROPHILS # BLD AUTO: 0.7 K/UL (ref 1.8–7.7)
NEUTROPHILS NFR BLD: 21.3 % (ref 38–73)
NITRITE UR QL STRIP: NEGATIVE
NRBC BLD-RTO: 0 /100 WBC
PH UR STRIP: 7 [PH] (ref 5–8)
PLATELET # BLD AUTO: 227 K/UL (ref 150–450)
PLATELET BLD QL SMEAR: ABNORMAL
PMV BLD AUTO: 9.8 FL (ref 9.2–12.9)
POLYCHROMASIA BLD QL SMEAR: ABNORMAL
POTASSIUM SERPL-SCNC: 4.1 MMOL/L (ref 3.5–5.1)
PROT SERPL-MCNC: 7.1 G/DL (ref 6–8.4)
PROT UR QL STRIP: NEGATIVE
RBC # BLD AUTO: 4.58 M/UL (ref 4–5.4)
SODIUM SERPL-SCNC: 141 MMOL/L (ref 136–145)
SP GR UR STRIP: <=1.005 (ref 1–1.03)
URN SPEC COLLECT METH UR: ABNORMAL
WBC # BLD AUTO: 3.3 K/UL (ref 3.9–12.7)

## 2022-01-10 PROCEDURE — 80053 COMPREHEN METABOLIC PANEL: CPT | Mod: PN | Performed by: INTERNAL MEDICINE

## 2022-01-10 PROCEDURE — 3079F PR MOST RECENT DIASTOLIC BLOOD PRESSURE 80-89 MM HG: ICD-10-PCS | Mod: CPTII,S$GLB,, | Performed by: INTERNAL MEDICINE

## 2022-01-10 PROCEDURE — 1159F MED LIST DOCD IN RCRD: CPT | Mod: CPTII,S$GLB,, | Performed by: INTERNAL MEDICINE

## 2022-01-10 PROCEDURE — 99215 PR OFFICE/OUTPT VISIT, EST, LEVL V, 40-54 MIN: ICD-10-PCS | Mod: S$GLB,,, | Performed by: INTERNAL MEDICINE

## 2022-01-10 PROCEDURE — 3075F SYST BP GE 130 - 139MM HG: CPT | Mod: CPTII,S$GLB,, | Performed by: INTERNAL MEDICINE

## 2022-01-10 PROCEDURE — 81003 URINALYSIS AUTO W/O SCOPE: CPT | Mod: PN | Performed by: INTERNAL MEDICINE

## 2022-01-10 PROCEDURE — 85025 COMPLETE CBC W/AUTO DIFF WBC: CPT | Mod: PN | Performed by: INTERNAL MEDICINE

## 2022-01-10 PROCEDURE — 99215 OFFICE O/P EST HI 40 MIN: CPT | Mod: S$GLB,,, | Performed by: INTERNAL MEDICINE

## 2022-01-10 PROCEDURE — 3079F DIAST BP 80-89 MM HG: CPT | Mod: CPTII,S$GLB,, | Performed by: INTERNAL MEDICINE

## 2022-01-10 PROCEDURE — 36415 COLL VENOUS BLD VENIPUNCTURE: CPT | Mod: PN | Performed by: INTERNAL MEDICINE

## 2022-01-10 PROCEDURE — 3075F PR MOST RECENT SYSTOLIC BLOOD PRESS GE 130-139MM HG: ICD-10-PCS | Mod: CPTII,S$GLB,, | Performed by: INTERNAL MEDICINE

## 2022-01-10 PROCEDURE — 99999 PR PBB SHADOW E&M-EST. PATIENT-LVL IV: ICD-10-PCS | Mod: PBBFAC,,, | Performed by: INTERNAL MEDICINE

## 2022-01-10 PROCEDURE — 3008F PR BODY MASS INDEX (BMI) DOCUMENTED: ICD-10-PCS | Mod: CPTII,S$GLB,, | Performed by: INTERNAL MEDICINE

## 2022-01-10 PROCEDURE — 83735 ASSAY OF MAGNESIUM: CPT | Mod: PN | Performed by: INTERNAL MEDICINE

## 2022-01-10 PROCEDURE — 3008F BODY MASS INDEX DOCD: CPT | Mod: CPTII,S$GLB,, | Performed by: INTERNAL MEDICINE

## 2022-01-10 PROCEDURE — 1159F PR MEDICATION LIST DOCUMENTED IN MEDICAL RECORD: ICD-10-PCS | Mod: CPTII,S$GLB,, | Performed by: INTERNAL MEDICINE

## 2022-01-10 PROCEDURE — 99999 PR PBB SHADOW E&M-EST. PATIENT-LVL IV: CPT | Mod: PBBFAC,,, | Performed by: INTERNAL MEDICINE

## 2022-01-10 NOTE — PROGRESS NOTES
PROGRESS NOTE    Subjective:       Patient ID: Gail Mckinnon is a 51 y.o. female.  MRN: [id number]  : 1970    Chief Complaint: Malignant neoplasm of sigmoid colon    History of Present Illness:   Gail Mckinnon is a 51 y.o. female who presents with  colon cancer, initially stage III and now with LN recurrence.       She completed adjuvant FOLFOX in 2020. She tolerated only 4 cycles of FOLFOX before she developed an infusion reaction to oxaliplatin in cycle 5 was well as cycle 6. She then completed 6 cycles of infusional 5 FU.     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 She is now on second line therapy with FOLFIRI and Avastin.         She presented to the ED on  with left flank pain. CT renal stone study showed Moderate hydronephrosis on the left secondary to 3 mm calculus at the UPJ.     Interim history:  She presents to discuss symptoms, labs, PET scan results and further recommendations.     She denies any further episodes of dysuria or frequency, her symptoms had responded to OTC Azo. She reports persistent nausea and occasional vomiting with chemotherapy but has not used the Sancuso patch yet.     She reports stable fatigue. She is cutting down on her sugar intake and increasing her activity.        Oncology History:  Oncology History   Malignant neoplasm of sigmoid colon   3/16/2020 Initial Diagnosis    Malignant neoplasm of sigmoid colon     3/31/2020 Cancer Staged    Staging form: Colon and Rectum, AJCC 8th Edition  - Clinical stage from 3/31/2020: Stage IIIC (cT4b, cN2a, cM0)     2020 - 2020 Chemotherapy    Treatment Summary   Plan Name: OP FOLFOX 6 Q2W  Treatment Goal: Curative  Status: Inactive  Start Date: 2020  End Date: 2020  Provider: Dylan Leyva MD  Chemotherapy: fluorouraciL injection 945 mg, 400 mg/m2 = 945 mg, Intravenous, Clinic/[facility] 1 time,   cycles  Administration: 945 mg (5/6/2020), 945 mg (5/20/2020), 945 mg (6/3/2020), 945 mg (6/17/2020), 945 mg (7/29/2020), 945 mg (8/12/2020), 945 mg (8/26/2020), 945 mg (9/9/2020), 945 mg (9/23/2020), 945 mg (10/6/2020), 945 mg (10/21/2020), 945 mg (11/4/2020)  fluorouraciL 2,400 mg/m2 = 5,665 mg in sodium chloride 0.9% 240 mL chemo infusion, 2,400 mg/m2 = 5,665 mg, Intravenous, Over 46 hours, 14 of 14 cycles  Administration: 5,665 mg (5/6/2020), 5,665 mg (5/20/2020), 5,665 mg (6/3/2020), 5,665 mg (6/17/2020), 5,665 mg (7/29/2020), 5,665 mg (8/12/2020), 5,665 mg (8/26/2020), 5,665 mg (9/9/2020), 5,665 mg (9/23/2020), 5,665 mg (10/6/2020), 5,665 mg (10/21/2020), 5,665 mg (11/4/2020)  leucovorin calcium 900 mg in dextrose 5 % 250 mL infusion, 945 mg, Intravenous, Clinic/[facility] 1 time, 14 of 14 cycles  Administration: 900 mg (5/6/2020), 900 mg (5/20/2020), 900 mg (6/3/2020), 900 mg (6/17/2020), 945 mg (6/30/2020), 945 mg (7/20/2020), 945 mg (7/29/2020), 945 mg (8/12/2020), 945 mg (8/26/2020), 945 mg (9/9/2020), 945 mg (9/23/2020), 945 mg (10/6/2020), 945 mg (10/21/2020), 945 mg (11/4/2020)  oxaliplatin (ELOXATIN) 200 mg in dextrose 5 % 500 mL chemo infusion, 201 mg, Intravenous, Clinic/[facility] 1 time, 6 of 6 cycles  Dose modification: 65 mg/m2 (original dose 85 mg/m2, Cycle 6)  Administration: 200 mg (5/6/2020), 200 mg (5/20/2020), 200 mg (6/3/2020), 200 mg (6/17/2020), 201 mg (6/30/2020), 150 mg (7/20/2020)     6/16/2021 -  Chemotherapy    Treatment Summary   Plan Name: OP COLORECTAL FOLFIRI + BEVACIZUMAB Q2W  Treatment Goal: Control  Status: Active  Start Date: 6/16/2021  End Date: 3/10/2022 (Planned)  Provider: Marah Santo MD  Chemotherapy: fluorouraciL injection 990 mg, 400 mg/m2 = 990 mg, Intravenous, Clinic/[facility] 1 time, 15 of 15 cycles  Administration: 990 mg (6/16/2021), 990 mg (6/30/2021), 990 mg (7/14/2021), 980 mg (7/28/2021), 990 mg (8/11/2021), 990 mg (8/25/2021), 990 mg (9/8/2021), 990 mg (9/22/2021), 990 mg  (10/6/2021), 990 mg (10/20/2021), 990 mg (11/3/2021), 990 mg (12/1/2021), 990 mg (12/15/2021), 990 mg (11/17/2021), 990 mg (12/28/2021)  fluorouraciL 2,400 mg/m2 = 5,950 mg in sodium chloride 0.9% 240 mL chemo infusion, 2,400 mg/m2 = 5,950 mg, Intravenous, Over 46 hours, 15 of 20 cycles  Administration: 5,950 mg (6/16/2021), 5,950 mg (6/30/2021), 5,950 mg (7/14/2021), 5,880 mg (7/28/2021), 5,930 mg (8/11/2021), 5,930 mg (8/25/2021), 5,930 mg (9/8/2021), 5,930 mg (9/22/2021), 5,930 mg (10/6/2021), 5,930 mg (10/20/2021), 5,930 mg (11/3/2021), 5,930 mg (12/1/2021), 5,930 mg (12/15/2021), 5,930 mg (11/17/2021), 5,930 mg (12/28/2021)  bevacizumab (AVASTIN) 600 mg in sodium chloride 0.9% 100 mL chemo infusion, 660 mg, Intravenous, Clinic/[facility] 1 time, 15 of 20 cycles  Administration: 600 mg (6/30/2021), 600 mg (7/14/2021), 600 mg (7/28/2021), 600 mg (6/16/2021), 600 mg (8/11/2021), 655 mg (8/25/2021), 600 mg (9/8/2021), 600 mg (9/22/2021), 600 mg (10/6/2021), 600 mg (10/20/2021), 600 mg (11/3/2021), 655 mg (12/1/2021), 600 mg (12/15/2021), 600 mg (11/17/2021), 600 mg (12/28/2021)  irinotecan (CAMPTOSAR) 440 mg in sodium chloride 0.9% 500 mL chemo infusion, 446 mg, Intravenous, Clinic/[facility] 1 time, 15 of 20 cycles  Administration: 440 mg (6/16/2021), 440 mg (6/30/2021), 440 mg (7/14/2021), 440 mg (7/28/2021), 440 mg (8/11/2021), 444 mg (8/25/2021), 440 mg (9/8/2021), 440 mg (9/22/2021), 440 mg (10/6/2021), 440 mg (10/20/2021), 440 mg (11/3/2021), 440 mg (12/1/2021), 440 mg (12/15/2021), 440 mg (11/17/2021), 440 mg (12/28/2021)  leucovorin calcium 400 mg/m2 = 990 mg in dextrose 5 % 250 mL infusion, 400 mg/m2 = 990 mg, Intravenous, Clinic/hospitals 1 time, 15 of 20 cycles  Administration: 990 mg (6/16/2021), 990 mg (6/30/2021), 990 mg (7/14/2021), 980 mg (7/28/2021), 990 mg (8/11/2021), 990 mg (8/25/2021), 990 mg (9/8/2021), 990 mg (9/22/2021), 990 mg (10/6/2021), 990 mg (10/20/2021), 990 mg (11/3/2021), 990 mg (12/1/2021), 990 mg  (12/15/2021), 990 mg (11/17/2021), 990 mg (12/28/2021)     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   4/3/2020 Initial Diagnosis    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     5/6/2020 - 11/17/2020 Chemotherapy    Treatment Summary   Plan Name: OP FOLFOX 6 Q2W  Treatment Goal: Curative  Status: Inactive  Start Date: 5/6/2020  End Date: 11/6/2020  Provider: Dylan Leyva MD  Chemotherapy: fluorouraciL injection 945 mg, 400 mg/m2 = 945 mg, Intravenous, Clinic/[facility] 1 time, 14 of 14 cycles  Administration: 945 mg (5/6/2020), 945 mg (5/20/2020), 945 mg (6/3/2020), 945 mg (6/17/2020), 945 mg (7/29/2020), 945 mg (8/12/2020), 945 mg (8/26/2020), 945 mg (9/9/2020), 945 mg (9/23/2020), 945 mg (10/6/2020), 945 mg (10/21/2020), 945 mg (11/4/2020)  fluorouraciL 2,400 mg/m2 = 5,665 mg in sodium chloride 0.9% 240 mL chemo infusion, 2,400 mg/m2 = 5,665 mg, Intravenous, Over 46 hours, 14 of 14 cycles  Administration: 5,665 mg (5/6/2020), 5,665 mg (5/20/2020), 5,665 mg (6/3/2020), 5,665 mg (6/17/2020), 5,665 mg (7/29/2020), 5,665 mg (8/12/2020), 5,665 mg (8/26/2020), 5,665 mg (9/9/2020), 5,665 mg (9/23/2020), 5,665 mg (10/6/2020), 5,665 mg (10/21/2020), 5,665 mg (11/4/2020)  leucovorin calcium 900 mg in dextrose 5 % 250 mL infusion, 945 mg, Intravenous, Clinic/[facility] 1 time, 14 of 14 cycles  Administration: 900 mg (5/6/2020), 900 mg (5/20/2020), 900 mg (6/3/2020), 900 mg (6/17/2020), 945 mg (6/30/2020), 945 mg (7/20/2020), 945 mg (7/29/2020), 945 mg (8/12/2020), 945 mg (8/26/2020), 945 mg (9/9/2020), 945 mg (9/23/2020), 945 mg (10/6/2020), 945 mg (10/21/2020), 945 mg (11/4/2020)  oxaliplatin (ELOXATIN) 200 mg in dextrose 5 % 500 mL chemo infusion, 201 mg, Intravenous, Clinic/[facility] 1 time, 6 of 6 cycles  Dose modification: 65 mg/m2 (original dose 85 mg/m2, Cycle 6)  Administration: 200 mg (5/6/2020), 200 mg (5/20/2020), 200 mg (6/3/2020), 200 mg (6/17/2020), 201 mg (6/30/2020), 150 mg (7/20/2020)     6/16/2021 -  Chemotherapy    Treatment Summary    Plan Name: OP COLORECTAL FOLFIRI + BEVACIZUMAB Q2W  Treatment Goal: Control  Status: Active  Start Date: 6/16/2021  End Date: 3/10/2022 (Planned)  Provider: Marah Santo MD  Chemotherapy: fluorouraciL injection 990 mg, 400 mg/m2 = 990 mg, Intravenous, Clinic/[facility] 1 time, 15 of 15 cycles  Administration: 990 mg (6/16/2021), 990 mg (6/30/2021), 990 mg (7/14/2021), 980 mg (7/28/2021), 990 mg (8/11/2021), 990 mg (8/25/2021), 990 mg (9/8/2021), 990 mg (9/22/2021), 990 mg (10/6/2021), 990 mg (10/20/2021), 990 mg (11/3/2021), 990 mg (12/1/2021), 990 mg (12/15/2021), 990 mg (11/17/2021), 990 mg (12/28/2021)  fluorouraciL 2,400 mg/m2 = 5,950 mg in sodium chloride 0.9% 240 mL chemo infusion, 2,400 mg/m2 = 5,950 mg, Intravenous, Over 46 hours, 15 of 20 cycles  Administration: 5,950 mg (6/16/2021), 5,950 mg (6/30/2021), 5,950 mg (7/14/2021), 5,880 mg (7/28/2021), 5,930 mg (8/11/2021), 5,930 mg (8/25/2021), 5,930 mg (9/8/2021), 5,930 mg (9/22/2021), 5,930 mg (10/6/2021), 5,930 mg (10/20/2021), 5,930 mg (11/3/2021), 5,930 mg (12/1/2021), 5,930 mg (12/15/2021), 5,930 mg (11/17/2021), 5,930 mg (12/28/2021)  bevacizumab (AVASTIN) 600 mg in sodium chloride 0.9% 100 mL chemo infusion, 660 mg, Intravenous, Clinic/[facility] 1 time, 15 of 20 cycles  Administration: 600 mg (6/30/2021), 600 mg (7/14/2021), 600 mg (7/28/2021), 600 mg (6/16/2021), 600 mg (8/11/2021), 655 mg (8/25/2021), 600 mg (9/8/2021), 600 mg (9/22/2021), 600 mg (10/6/2021), 600 mg (10/20/2021), 600 mg (11/3/2021), 655 mg (12/1/2021), 600 mg (12/15/2021), 600 mg (11/17/2021), 600 mg (12/28/2021)  irinotecan (CAMPTOSAR) 440 mg in sodium chloride 0.9% 500 mL chemo infusion, 446 mg, Intravenous, Clinic/[facility] 1 time, 15 of 20 cycles  Administration: 440 mg (6/16/2021), 440 mg (6/30/2021), 440 mg (7/14/2021), 440 mg (7/28/2021), 440 mg (8/11/2021), 444 mg (8/25/2021), 440 mg (9/8/2021), 440 mg (9/22/2021), 440 mg (10/6/2021), 440 mg (10/20/2021), 440 mg (11/3/2021), 440 mg  (2021), 440 mg (12/15/2021), 440 mg (2021), 440 mg (2021)  leucovorin calcium 400 mg/m2 = 990 mg in dextrose 5 % 250 mL infusion, 400 mg/m2 = 990 mg, Intravenous, Clinic[facility] 1 time, 15 of 20 cycles  Administration: 990 mg (2021), 990 mg (2021), 990 mg (2021), 980 mg (2021), 990 mg (2021), 990 mg (2021), 990 mg (2021), 990 mg (2021), 990 mg (10/6/2021), 990 mg (10/20/2021), 990 mg (11/3/2021), 990 mg (2021), 990 mg (12/15/2021), 990 mg (2021), 990 mg (2021)         History:  Past Medical History:   Diagnosis Date    Anxiety     Depression     FH: ovarian cancer 3/16/2020    Hx of psychiatric care     Effexor, Paxil, Lexapro, Zoloft, Wellbutrin, Trazodone Buspar    Hyperthyroidism     Hypothyroid     Kidney calculi     Malignant neoplasm of sigmoid colon 3/16/2020    Menorrhagia     Multinodular goiter 2012    Palpitation     Psychiatric problem     Venous insufficiency       Past Surgical History:   Procedure Laterality Date     SECTION, CLASSIC      x3    COLONOSCOPY N/A 2020    Procedure: COLONOSCOPY;  Surgeon: Shane Parker MD;  Location: [facility];  Service: Endoscopy;  Laterality: N/A;    CYSTOSCOPY W/ URETERAL STENT PLACEMENT Bilateral 3/25/2020    Procedure: CYSTOSCOPY, WITH URETERAL STENT INSERTION;  Surgeon: Claudio Tyson MD;  Location: [address];  Service: Urology;  Laterality: Bilateral;    INSERTION OF TUNNELED CENTRAL VENOUS CATHETER (CVC) WITH SUBCUTANEOUS PORT N/A 2020    Procedure: [id number];  Surgeon: [facility] Diagnostic Provider;  Location: [facility] OR [address] [place] [address];  Service: Radiology;  Laterality: N/A;  Room 189/Cindy    LAPAROSCOPIC SIGMOIDECTOMY N/A 3/25/2020    Procedure: COLECTOMY, SIGMOID, LAPAROSCOPIC, flex sig, ERAS high;  Surgeon: Silvio Man MD;  Location: [facility] OR [address] [place] [address];  Service: Colon and Rectal;  Laterality: N/A;    MOBILIZATION OF SPLENIC  FLEXURE  3/25/2020    Procedure: MOBILIZATION, SPLENIC FLEXURE;  Surgeon: Silvio Man MD;  Location: [facility] OR 2ND FLR;  Service: Colon and Rectal;;    tonsillectomy      TOTAL ABDOMINAL HYSTERECTOMY W/ BILATERAL SALPINGOOPHORECTOMY N/A 3/25/2020    Procedure: HYSTERECTOMY, TOTAL, ABDOMINAL, WITH BILATERAL SALPINGO-OOPHORECTOMY;  Surgeon: Keron Brady MD;  Location: [facility] OR 2ND FLR;  Service: Oncology;  Laterality: N/A;     Family History   Problem Relation Age of Onset    Heart disease Father     Diabetes Mother 65    Drug abuse Brother     Drug abuse Brother     Cancer Maternal Aunt         lung cancer    Cancer Maternal Grandmother         stomach cancer- started in ovaries    Ovarian cancer Maternal Grandmother         stomach cancer- started in ovaries    Colon cancer Paternal Uncle     Cancer Paternal Uncle     Ovarian cancer Maternal Aunt     Ovarian cancer Maternal Aunt     Ovarian cancer Cousin         mother had ovarian cancer    Drug abuse Son     Drug abuse Son         clean/sober since 2012    Colon cancer Son     No Known Problems Daughter     Uterine cancer Neg Hx     Breast cancer Neg Hx      Social History     Tobacco Use    Smoking status: Never Smoker    Smokeless tobacco: Never Used   Substance and Sexual Activity    Alcohol use: Yes     Comment: occasionally- twice monthly    Drug use: Never    Sexual activity: Yes     Partners: Male     Birth control/protection: See Surgical Hx        ROS:   Review of Systems   Constitutional: Positive for malaise/fatigue. Negative for fever and weight loss.   HENT: Negative for congestion, hearing loss, nosebleeds and sore throat.    Eyes: Negative for double vision and photophobia.   Respiratory: Negative for cough, hemoptysis, sputum production, shortness of breath and wheezing.    Cardiovascular: Negative for chest pain, palpitations, orthopnea and leg swelling.   Gastrointestinal: Positive for constipation and nausea.  "Negative for abdominal pain, blood in stool, diarrhea, heartburn and vomiting.   Genitourinary: Negative for dysuria, hematuria and urgency.   Musculoskeletal: Negative for back pain, joint pain and myalgias.   Skin: Negative for itching and rash.   Neurological: Negative for dizziness, tingling, seizures, weakness and headaches.   Endo/Heme/Allergies: Negative for polydipsia. Does not bruise/bleed easily.   Psychiatric/Behavioral: Negative for depression and memory loss. The patient is nervous/anxious. The patient does not have insomnia.         Objective:     Vitals:    01/10/22 1431   BP: 130/89   Pulse: 81   Resp: 18   Temp: 96.7 °F (35.9 °C)   TempSrc: Temporal   SpO2: 97%   Weight: 133.4 kg (294 lb 1.5 oz)   Height: 5' 6" (1.676 m)   PainSc: 0-No pain     Wt Readings from Last 10 Encounters:   01/10/22 133.4 kg (294 lb 1.5 oz)   12/28/21 135.4 kg (298 lb 8.1 oz)   12/23/21 133.4 kg (294 lb 1.5 oz)   12/15/21 135.2 kg (298 lb 1 oz)   12/13/21 135.2 kg (298 lb 1 oz)   12/01/21 135.9 kg (299 lb 9.7 oz)   11/29/21 135.9 kg (299 lb 9.7 oz)   11/26/21 134.3 kg (296 lb 1.2 oz)   11/17/21 134.1 kg (295 lb 10.2 oz)   11/15/21 134.1 kg (295 lb 10.2 oz)       Physical Examination:   Physical Exam  Vitals and nursing note reviewed.   Constitutional:       General: She is not in acute distress.     Appearance: She is not diaphoretic.   HENT:      Head: Normocephalic.      Mouth/Throat:      Mouth: Oropharynx is clear and moist.      Pharynx: No oropharyngeal exudate.   Eyes:      General: No scleral icterus.     Extraocular Movements: EOM normal.      Conjunctiva/sclera: Conjunctivae normal.   Neck:      Thyroid: No thyromegaly.   Cardiovascular:      Rate and Rhythm: Normal rate and regular rhythm.      Heart sounds: Normal heart sounds. No murmur heard.      Pulmonary:      Effort: Pulmonary effort is normal. No respiratory distress.      Breath sounds: No stridor. No wheezing or rales.   Chest:      Chest wall: No " tenderness.   Abdominal:      General: Bowel sounds are normal. There is no distension.      Palpations: Abdomen is soft. There is no mass.      Tenderness: There is no abdominal tenderness. There is no rebound.   Musculoskeletal:         General: No tenderness, deformity or edema. Normal range of motion.      Cervical back: Neck supple.   Lymphadenopathy:      Cervical: No cervical adenopathy.   Skin:     General: Skin is warm and dry.      Findings: No erythema or rash.   Neurological:      Mental Status: She is alert and oriented to person, place, and time.      Cranial Nerves: No cranial nerve deficit.      Coordination: Coordination normal.      Gait: Gait is intact.   Psychiatric:         Mood and Affect: Affect normal.         Cognition and Memory: Memory normal.         Judgment: Judgment normal.          Diagnostic Tests:  Significant Imaging: I have reviewed and interpreted all pertinent imaging results/findings.    PET:  Impression:     No FDG PET/CT findings to suggest recurrent or metastatic disease.  The patient's left thyroid uptake is stable from prior exam.        Electronically signed by: Rupali Mayen MD  Date:                                            01/06/2022  Time:                                           11:10    Laboratory Data:  All pertinent labs have been reviewed.  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 Oxaliplatin was stopped due to severe adverse reaction.     Follow-up CTs and PET in May 2021 showed enlarging retroperitoneal node, concerning for metastatic disease.      She is tolerating FOLFIRI + Avastin well and has completed 15 cycles so far. Most recent PET scan showed complete resolution of previously noted hypermetabolic nodes and no other sites of disease.     I will discuss her case at our tumor board to see if she is a surgical candidate. If not, we will consider maintenance 5 FU and Avastin.       Given B Adán mutation, she would be a candidate for Cetuximab and BRAF inhibitor at the time of disease progression.    Chemotherapy-induced neutropenia  ANC is 0.7 today. Delay chemo by 1 week. Drop 5 FU bolus with subsequent treatments.     Renal stones  Continue Urology follow up.     Hypercalcemia  she has mild primary hyperparathyroidism.  Continue to monitor calcium, continue adequate hydration.    Transaminitis  Fatty liver   Mild and stable. Continue to monitor.     Other constipation   Continue stool softeners and intermittent lactulose.     Nausea  Uncontrolled with promethazine and zofran. Start Sancuso patch.     Dysthymia  Continue Wellbutrin and Buspar and  follow up with Dr. Palm.      ECOG SCORE    0 - Fully active-able to carry on all pre-disease performance without restriction           Discussion:   Follow up in about 1 week (around 1/17/2022) for Chemo, NP, Lab Results.    Plan was discussed with the patient at length, and she verbalized understanding. Gail was given an opportunity to ask questions that were answered to her satisfaction, and she was advised to call in the interval if any problems or questions arise.    Electronically signed by Marah Santo MD

## 2022-01-11 ENCOUNTER — PATIENT MESSAGE (OUTPATIENT)
Dept: HEMATOLOGY/ONCOLOGY | Facility: CLINIC | Age: 52
End: 2022-01-11
Payer: COMMERCIAL

## 2022-01-11 ENCOUNTER — PATIENT MESSAGE (OUTPATIENT)
Dept: PSYCHIATRY | Facility: CLINIC | Age: 52
End: 2022-01-11
Payer: COMMERCIAL

## 2022-01-12 PROBLEM — D70.1 CHEMOTHERAPY-INDUCED NEUTROPENIA: Status: ACTIVE | Noted: 2022-01-12

## 2022-01-12 PROBLEM — T45.1X5A CHEMOTHERAPY-INDUCED NEUTROPENIA: Status: ACTIVE | Noted: 2022-01-12

## 2022-01-18 ENCOUNTER — OFFICE VISIT (OUTPATIENT)
Dept: HEMATOLOGY/ONCOLOGY | Facility: CLINIC | Age: 52
End: 2022-01-18
Payer: COMMERCIAL

## 2022-01-18 ENCOUNTER — LAB VISIT (OUTPATIENT)
Dept: LAB | Facility: HOSPITAL | Age: 52
End: 2022-01-18
Attending: INTERNAL MEDICINE
Payer: COMMERCIAL

## 2022-01-18 VITALS
HEART RATE: 73 BPM | WEIGHT: 293 LBS | TEMPERATURE: 98 F | SYSTOLIC BLOOD PRESSURE: 124 MMHG | RESPIRATION RATE: 18 BRPM | BODY MASS INDEX: 47.09 KG/M2 | DIASTOLIC BLOOD PRESSURE: 88 MMHG | HEIGHT: 66 IN | OXYGEN SATURATION: 96 %

## 2022-01-18 DIAGNOSIS — C18.7 MALIGNANT NEOPLASM OF SIGMOID COLON: ICD-10-CM

## 2022-01-18 DIAGNOSIS — K76.0 FATTY LIVER: ICD-10-CM

## 2022-01-18 DIAGNOSIS — E04.1 THYROID NODULE: ICD-10-CM

## 2022-01-18 DIAGNOSIS — K12.31 MUCOSITIS DUE TO CHEMOTHERAPY: ICD-10-CM

## 2022-01-18 DIAGNOSIS — C77.2 METASTASIS TO INTESTINAL LYMPH NODE: ICD-10-CM

## 2022-01-18 DIAGNOSIS — E83.52 HYPERCALCEMIA: ICD-10-CM

## 2022-01-18 DIAGNOSIS — C18.7 MALIGNANT NEOPLASM OF SIGMOID COLON: Primary | ICD-10-CM

## 2022-01-18 LAB
ALBUMIN SERPL BCP-MCNC: 4 G/DL (ref 3.5–5.2)
ALP SERPL-CCNC: 152 U/L (ref 55–135)
ALT SERPL W/O P-5'-P-CCNC: 80 U/L (ref 10–44)
ANION GAP SERPL CALC-SCNC: 6 MMOL/L (ref 8–16)
AST SERPL-CCNC: 55 U/L (ref 10–40)
BASOPHILS # BLD AUTO: 0.05 K/UL (ref 0–0.2)
BASOPHILS NFR BLD: 1.3 % (ref 0–1.9)
BILIRUB SERPL-MCNC: 0.5 MG/DL (ref 0.1–1)
BUN SERPL-MCNC: 7 MG/DL (ref 6–20)
CALCIUM SERPL-MCNC: 11.1 MG/DL (ref 8.7–10.5)
CHLORIDE SERPL-SCNC: 111 MMOL/L (ref 95–110)
CO2 SERPL-SCNC: 24 MMOL/L (ref 23–29)
CREAT SERPL-MCNC: 0.9 MG/DL (ref 0.5–1.4)
DIFFERENTIAL METHOD: ABNORMAL
EOSINOPHIL # BLD AUTO: 0.3 K/UL (ref 0–0.5)
EOSINOPHIL NFR BLD: 6.6 % (ref 0–8)
ERYTHROCYTE [DISTWIDTH] IN BLOOD BY AUTOMATED COUNT: 15.3 % (ref 11.5–14.5)
EST. GFR  (AFRICAN AMERICAN): >60 ML/MIN/1.73 M^2
EST. GFR  (NON AFRICAN AMERICAN): >60 ML/MIN/1.73 M^2
GLUCOSE SERPL-MCNC: 105 MG/DL (ref 70–110)
HCT VFR BLD AUTO: 45.7 % (ref 37–48.5)
HGB BLD-MCNC: 14.8 G/DL (ref 12–16)
IMM GRANULOCYTES # BLD AUTO: 0.03 K/UL (ref 0–0.04)
IMM GRANULOCYTES NFR BLD AUTO: 0.8 % (ref 0–0.5)
LYMPHOCYTES # BLD AUTO: 1.7 K/UL (ref 1–4.8)
LYMPHOCYTES NFR BLD: 42.9 % (ref 18–48)
MCH RBC QN AUTO: 31.3 PG (ref 27–31)
MCHC RBC AUTO-ENTMCNC: 32.4 G/DL (ref 32–36)
MCV RBC AUTO: 97 FL (ref 82–98)
MONOCYTES # BLD AUTO: 0.6 K/UL (ref 0.3–1)
MONOCYTES NFR BLD: 15.2 % (ref 4–15)
NEUTROPHILS # BLD AUTO: 1.3 K/UL (ref 1.8–7.7)
NEUTROPHILS NFR BLD: 33.2 % (ref 38–73)
NRBC BLD-RTO: 0 /100 WBC
PLATELET # BLD AUTO: 233 K/UL (ref 150–450)
PMV BLD AUTO: 9.9 FL (ref 9.2–12.9)
POTASSIUM SERPL-SCNC: 4.1 MMOL/L (ref 3.5–5.1)
PROT SERPL-MCNC: 7.4 G/DL (ref 6–8.4)
RBC # BLD AUTO: 4.73 M/UL (ref 4–5.4)
SODIUM SERPL-SCNC: 141 MMOL/L (ref 136–145)
WBC # BLD AUTO: 3.96 K/UL (ref 3.9–12.7)

## 2022-01-18 PROCEDURE — 99999 PR PBB SHADOW E&M-EST. PATIENT-LVL IV: ICD-10-PCS | Mod: PBBFAC,,, | Performed by: NURSE PRACTITIONER

## 2022-01-18 PROCEDURE — 36415 COLL VENOUS BLD VENIPUNCTURE: CPT | Mod: PN | Performed by: INTERNAL MEDICINE

## 2022-01-18 PROCEDURE — 80053 COMPREHEN METABOLIC PANEL: CPT | Mod: PN | Performed by: INTERNAL MEDICINE

## 2022-01-18 PROCEDURE — 3008F BODY MASS INDEX DOCD: CPT | Mod: CPTII,S$GLB,, | Performed by: NURSE PRACTITIONER

## 2022-01-18 PROCEDURE — 1160F PR REVIEW ALL MEDS BY PRESCRIBER/CLIN PHARMACIST DOCUMENTED: ICD-10-PCS | Mod: CPTII,S$GLB,, | Performed by: NURSE PRACTITIONER

## 2022-01-18 PROCEDURE — 99999 PR PBB SHADOW E&M-EST. PATIENT-LVL IV: CPT | Mod: PBBFAC,,, | Performed by: NURSE PRACTITIONER

## 2022-01-18 PROCEDURE — 3079F DIAST BP 80-89 MM HG: CPT | Mod: CPTII,S$GLB,, | Performed by: NURSE PRACTITIONER

## 2022-01-18 PROCEDURE — 3074F PR MOST RECENT SYSTOLIC BLOOD PRESSURE < 130 MM HG: ICD-10-PCS | Mod: CPTII,S$GLB,, | Performed by: NURSE PRACTITIONER

## 2022-01-18 PROCEDURE — 3074F SYST BP LT 130 MM HG: CPT | Mod: CPTII,S$GLB,, | Performed by: NURSE PRACTITIONER

## 2022-01-18 PROCEDURE — 1160F RVW MEDS BY RX/DR IN RCRD: CPT | Mod: CPTII,S$GLB,, | Performed by: NURSE PRACTITIONER

## 2022-01-18 PROCEDURE — 3008F PR BODY MASS INDEX (BMI) DOCUMENTED: ICD-10-PCS | Mod: CPTII,S$GLB,, | Performed by: NURSE PRACTITIONER

## 2022-01-18 PROCEDURE — 1159F PR MEDICATION LIST DOCUMENTED IN MEDICAL RECORD: ICD-10-PCS | Mod: CPTII,S$GLB,, | Performed by: NURSE PRACTITIONER

## 2022-01-18 PROCEDURE — 3079F PR MOST RECENT DIASTOLIC BLOOD PRESSURE 80-89 MM HG: ICD-10-PCS | Mod: CPTII,S$GLB,, | Performed by: NURSE PRACTITIONER

## 2022-01-18 PROCEDURE — 85025 COMPLETE CBC W/AUTO DIFF WBC: CPT | Mod: PN | Performed by: INTERNAL MEDICINE

## 2022-01-18 PROCEDURE — 99214 OFFICE O/P EST MOD 30 MIN: CPT | Mod: S$GLB,,, | Performed by: NURSE PRACTITIONER

## 2022-01-18 PROCEDURE — 99214 PR OFFICE/OUTPT VISIT, EST, LEVL IV, 30-39 MIN: ICD-10-PCS | Mod: S$GLB,,, | Performed by: NURSE PRACTITIONER

## 2022-01-18 PROCEDURE — 1159F MED LIST DOCD IN RCRD: CPT | Mod: CPTII,S$GLB,, | Performed by: NURSE PRACTITIONER

## 2022-01-18 RX ORDER — SODIUM CHLORIDE 0.9 % (FLUSH) 0.9 %
10 SYRINGE (ML) INJECTION
Status: CANCELLED | OUTPATIENT
Start: 2022-01-19

## 2022-01-18 RX ORDER — SODIUM CHLORIDE 0.9 % (FLUSH) 0.9 %
10 SYRINGE (ML) INJECTION
Status: CANCELLED | OUTPATIENT
Start: 2022-01-21

## 2022-01-18 RX ORDER — HEPARIN 100 UNIT/ML
500 SYRINGE INTRAVENOUS
Status: CANCELLED | OUTPATIENT
Start: 2022-01-19

## 2022-01-18 RX ORDER — LORAZEPAM 2 MG/ML
1 INJECTION INTRAMUSCULAR
Status: CANCELLED | OUTPATIENT
Start: 2022-01-19 | End: 2022-01-19

## 2022-01-18 RX ORDER — ATROPINE SULFATE 0.4 MG/ML
0.4 INJECTION, SOLUTION ENDOTRACHEAL; INTRAMEDULLARY; INTRAMUSCULAR; INTRAVENOUS; SUBCUTANEOUS ONCE AS NEEDED
Status: CANCELLED | OUTPATIENT
Start: 2022-01-19

## 2022-01-18 RX ORDER — HEPARIN 100 UNIT/ML
500 SYRINGE INTRAVENOUS
Status: CANCELLED | OUTPATIENT
Start: 2022-01-21

## 2022-01-18 NOTE — PROGRESS NOTES
Subjective:       Patient ID: Gail Mckinnon is a 51 y.o. female.    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    Sigmoid colectomy:  03/25/2020  She completed adjuvant FOLFOX in November 2020.  She tolerated 4 cycles of FOLFOX before she developed an infusion reaction to Oxaliplatin in Cycles 5 & 6.  She completed 6 cycles of infusional 5-FU.    On 6 month surveillance imaging, PET scan was concerning for multiple retroperitoneal lymph nodes with high SUV consistent with metastatic disease.  Liver MRI did not show any liver metastasis.     Patient was placed on FOLFIRI + Avastin on 06/16/2021    To NOTE:  11/26/21:  Presented to the ED with left flank pain with pain radiating down groin.  CT renal stone study showed moderate hydronephrosis on the left secondary to 3 mm calculus at the UPJ.    Last week:  01/10/22:  Cycle 16 was held due to ANC = 703.  5FU bolus dropped from plan.    Today 01/18/22:  Patient presents to the clinic today for evaluation and clearance prior to Cycle 16 of FOLFIRI/Avastin.  She reports that Sancuso has helped with post treatment nausea.  Her mouth sores seem to heal better with warm salt water & baking soda.  She denies any fevers, chills, abdominal discomfort, N/V/D, bleeding, dysuria, etc.  Her appetite is well preserved and she is hydrating.  No other new complaints or pertinent findings on a 14 point review of systems.    Oncology History   Malignant neoplasm of sigmoid colon   3/16/2020 Initial Diagnosis    Malignant neoplasm of sigmoid colon     3/31/2020 Cancer Staged    Staging form: Colon and Rectum, AJCC 8th Edition  - Clinical stage from 3/31/2020: Stage IIIC (cT4b, cN2a, cM0)     5/6/2020 - 11/17/2020 Chemotherapy    Treatment Summary   Plan Name: OP FOLFOX 6 Q2W  Treatment Goal:  Curative  Status: Inactive  Start Date: 5/6/2020  End Date: 11/6/2020  Provider: Dylan Leyva MD  Chemotherapy: fluorouraciL injection 945 mg, 400 mg/m2 = 945 mg, Intravenous, Clinic/[facility] 1 time, 14 of 14 cycles  Administration: 945 mg (5/6/2020), 945 mg (5/20/2020), 945 mg (6/3/2020), 945 mg (6/17/2020), 945 mg (7/29/2020), 945 mg (8/12/2020), 945 mg (8/26/2020), 945 mg (9/9/2020), 945 mg (9/23/2020), 945 mg (10/6/2020), 945 mg (10/21/2020), 945 mg (11/4/2020)  fluorouraciL 2,400 mg/m2 = 5,665 mg in sodium chloride 0.9% 240 mL chemo infusion, 2,400 mg/m2 = 5,665 mg, Intravenous, Over 46 hours, 14 of 14 cycles  Administration: 5,665 mg (5/6/2020), 5,665 mg (5/20/2020), 5,665 mg (6/3/2020), 5,665 mg (6/17/2020), 5,665 mg (7/29/2020), 5,665 mg (8/12/2020), 5,665 mg (8/26/2020), 5,665 mg (9/9/2020), 5,665 mg (9/23/2020), 5,665 mg (10/6/2020), 5,665 mg (10/21/2020), 5,665 mg (11/4/2020)  leucovorin calcium 900 mg in dextrose 5 % 250 mL infusion, 945 mg, Intravenous, Clinic/[facility] 1 time, 14 of 14 cycles  Administration: 900 mg (5/6/2020), 900 mg (5/20/2020), 900 mg (6/3/2020), 900 mg (6/17/2020), 945 mg (6/30/2020), 945 mg (7/20/2020), 945 mg (7/29/2020), 945 mg (8/12/2020), 945 mg (8/26/2020), 945 mg (9/9/2020), 945 mg (9/23/2020), 945 mg (10/6/2020), 945 mg (10/21/2020), 945 mg (11/4/2020)  oxaliplatin (ELOXATIN) 200 mg in dextrose 5 % 500 mL chemo infusion, 201 mg, Intravenous, Clinic/[facility] 1 time, 6 of 6 cycles  Dose modification: 65 mg/m2 (original dose 85 mg/m2, Cycle 6)  Administration: 200 mg (5/6/2020), 200 mg (5/20/2020), 200 mg (6/3/2020), 200 mg (6/17/2020), 201 mg (6/30/2020), 150 mg (7/20/2020)     6/16/2021 -  Chemotherapy    Treatment Summary   Plan Name: OP COLORECTAL FOLFIRI + BEVACIZUMAB Q2W  Treatment Goal: Control  Status: Active  Start Date: 6/16/2021  End Date: 3/18/2022 (Planned)  Provider: Marah Santo MD  Chemotherapy: fluorouraciL injection 990 mg, 400 mg/m2 = 990 mg, Intravenous, Clinic/[facility]  1 time, 15 of 15 cycles  Administration: 990 mg (6/16/2021), 990 mg (6/30/2021), 990 mg (7/14/2021), 980 mg (7/28/2021), 990 mg (8/11/2021), 990 mg (8/25/2021), 990 mg (9/8/2021), 990 mg (9/22/2021), 990 mg (10/6/2021), 990 mg (10/20/2021), 990 mg (11/3/2021), 990 mg (12/1/2021), 990 mg (12/15/2021), 990 mg (11/17/2021), 990 mg (12/28/2021)  fluorouraciL 2,400 mg/m2 = 5,950 mg in sodium chloride 0.9% 240 mL chemo infusion, 2,400 mg/m2 = 5,950 mg, Intravenous, Over 46 hours, 15 of 20 cycles  Administration: 5,950 mg (6/16/2021), 5,950 mg (6/30/2021), 5,950 mg (7/14/2021), 5,880 mg (7/28/2021), 5,930 mg (8/11/2021), 5,930 mg (8/25/2021), 5,930 mg (9/8/2021), 5,930 mg (9/22/2021), 5,930 mg (10/6/2021), 5,930 mg (10/20/2021), 5,930 mg (11/3/2021), 5,930 mg (12/1/2021), 5,930 mg (12/15/2021), 5,930 mg (11/17/2021), 5,930 mg (12/28/2021)  bevacizumab (AVASTIN) 600 mg in sodium chloride 0.9% 100 mL chemo infusion, 660 mg, Intravenous, Clinic[facility] 1 time, 15 of 20 cycles  Administration: 600 mg (6/30/2021), 600 mg (7/14/2021), 600 mg (7/28/2021), 600 mg (6/16/2021), 600 mg (8/11/2021), 655 mg (8/25/2021), 600 mg (9/8/2021), 600 mg (9/22/2021), 600 mg (10/6/2021), 600 mg (10/20/2021), 600 mg (11/3/2021), 655 mg (12/1/2021), 600 mg (12/15/2021), 600 mg (11/17/2021), 600 mg (12/28/2021)  irinotecan (CAMPTOSAR) 440 mg in sodium chloride 0.9% 500 mL chemo infusion, 446 mg, Intravenous, Clinic/[facility] 1 time, 15 of 20 cycles  Administration: 440 mg (6/16/2021), 440 mg (6/30/2021), 440 mg (7/14/2021), 440 mg (7/28/2021), 440 mg (8/11/2021), 444 mg (8/25/2021), 440 mg (9/8/2021), 440 mg (9/22/2021), 440 mg (10/6/2021), 440 mg (10/20/2021), 440 mg (11/3/2021), 440 mg (12/1/2021), 440 mg (12/15/2021), 440 mg (11/17/2021), 440 mg (12/28/2021)  leucovorin calcium 400 mg/m2 = 990 mg in dextrose 5 % 250 mL infusion, 400 mg/m2 = 990 mg, Intravenous, Clinic/H[facility] 1 time, 15 of 20 cycles  Administration: 990 mg (6/16/2021), 990 mg (6/30/2021), 990  mg (7/14/2021), 980 mg (7/28/2021), 990 mg (8/11/2021), 990 mg (8/25/2021), 990 mg (9/8/2021), 990 mg (9/22/2021), 990 mg (10/6/2021), 990 mg (10/20/2021), 990 mg (11/3/2021), 990 mg (12/1/2021), 990 mg (12/15/2021), 990 mg (11/17/2021), 990 mg (12/28/2021)     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   4/3/2020 Initial Diagnosis    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     5/6/2020 - 11/17/2020 Chemotherapy    Treatment Summary   Plan Name: OP FOLFOX 6 Q2W  Treatment Goal: Curative  Status: Inactive  Start Date: 5/6/2020  End Date: 11/6/2020  Provider: Dylan Leyva MD  Chemotherapy: fluorouraciL injection 945 mg, 400 mg/m2 = 945 mg, Intravenous, Clinic/[facility] 1 time, 14 of 14 cycles  Administration: 945 mg (5/6/2020), 945 mg (5/20/2020), 945 mg (6/3/2020), 945 mg (6/17/2020), 945 mg (7/29/2020), 945 mg (8/12/2020), 945 mg (8/26/2020), 945 mg (9/9/2020), 945 mg (9/23/2020), 945 mg (10/6/2020), 945 mg (10/21/2020), 945 mg (11/4/2020)  fluorouraciL 2,400 mg/m2 = 5,665 mg in sodium chloride 0.9% 240 mL chemo infusion, 2,400 mg/m2 = 5,665 mg, Intravenous, Over 46 hours, 14 of 14 cycles  Administration: 5,665 mg (5/6/2020), 5,665 mg (5/20/2020), 5,665 mg (6/3/2020), 5,665 mg (6/17/2020), 5,665 mg (7/29/2020), 5,665 mg (8/12/2020), 5,665 mg (8/26/2020), 5,665 mg (9/9/2020), 5,665 mg (9/23/2020), 5,665 mg (10/6/2020), 5,665 mg (10/21/2020), 5,665 mg (11/4/2020)  leucovorin calcium 900 mg in dextrose 5 % 250 mL infusion, 945 mg, Intravenous, Clinic/H[facility] 1 time, 14 of 14 cycles  Administration: 900 mg (5/6/2020), 900 mg (5/20/2020), 900 mg (6/3/2020), 900 mg (6/17/2020), 945 mg (6/30/2020), 945 mg (7/20/2020), 945 mg (7/29/2020), 945 mg (8/12/2020), 945 mg (8/26/2020), 945 mg (9/9/2020), 945 mg (9/23/2020), 945 mg (10/6/2020), 945 mg (10/21/2020), 945 mg (11/4/2020)  oxaliplatin (ELOXATIN) 200 mg in dextrose 5 % 500 mL chemo infusion, 201 mg, Intravenous, Clinic/HOD 1 time, 6 of 6 cycles  Dose modification: 65 mg/m2 (original dose  85 mg/m2, Cycle 6)  Administration: 200 mg (5/6/2020), 200 mg (5/20/2020), 200 mg (6/3/2020), 200 mg (6/17/2020), 201 mg (6/30/2020), 150 mg (7/20/2020)     6/16/2021 -  Chemotherapy    Treatment Summary   Plan Name: OP COLORECTAL FOLFIRI + BEVACIZUMAB Q2W  Treatment Goal: Control  Status: Active  Start Date: 6/16/2021  End Date: 3/18/2022 (Planned)  Provider: Marah Santo MD  Chemotherapy: fluorouraciL injection 990 mg, 400 mg/m2 = 990 mg, Intravenous, Clinic[facility] 1 time, 15 of 15 cycles  Administration: 990 mg (6/16/2021), 990 mg (6/30/2021), 990 mg (7/14/2021), 980 mg (7/28/2021), 990 mg (8/11/2021), 990 mg (8/25/2021), 990 mg (9/8/2021), 990 mg (9/22/2021), 990 mg (10/6/2021), 990 mg (10/20/2021), 990 mg (11/3/2021), 990 mg (12/1/2021), 990 mg (12/15/2021), 990 mg (11/17/2021), 990 mg (12/28/2021)  fluorouraciL 2,400 mg/m2 = 5,950 mg in sodium chloride 0.9% 240 mL chemo infusion, 2,400 mg/m2 = 5,950 mg, Intravenous, Over 46 hours, 15 of 20 cycles  Administration: 5,950 mg (6/16/2021), 5,950 mg (6/30/2021), 5,950 mg (7/14/2021), 5,880 mg (7/28/2021), 5,930 mg (8/11/2021), 5,930 mg (8/25/2021), 5,930 mg (9/8/2021), 5,930 mg (9/22/2021), 5,930 mg (10/6/2021), 5,930 mg (10/20/2021), 5,930 mg (11/3/2021), 5,930 mg (12/1/2021), 5,930 mg (12/15/2021), 5,930 mg (11/17/2021), 5,930 mg (12/28/2021)  bevacizumab (AVASTIN) 600 mg in sodium chloride 0.9% 100 mL chemo infusion, 660 mg, Intravenous, Clinic/H[facility] 1 time, 15 of 20 cycles  Administration: 600 mg (6/30/2021), 600 mg (7/14/2021), 600 mg (7/28/2021), 600 mg (6/16/2021), 600 mg (8/11/2021), 655 mg (8/25/2021), 600 mg (9/8/2021), 600 mg (9/22/2021), 600 mg (10/6/2021), 600 mg (10/20/2021), 600 mg (11/3/2021), 655 mg (12/1/2021), 600 mg (12/15/2021), 600 mg (11/17/2021), 600 mg (12/28/2021)  irinotecan (CAMPTOSAR) 440 mg in sodium chloride 0.9% 500 mL chemo infusion, 446 mg, Intravenous, Clinic/H[facility] 1 time, 15 of 20 cycles  Administration: 440 mg (6/16/2021), 440 mg  "(6/30/2021), 440 mg (7/14/2021), 440 mg (7/28/2021), 440 mg (8/11/2021), 444 mg (8/25/2021), 440 mg (9/8/2021), 440 mg (9/22/2021), 440 mg (10/6/2021), 440 mg (10/20/2021), 440 mg (11/3/2021), 440 mg (12/1/2021), 440 mg (12/15/2021), 440 mg (11/17/2021), 440 mg (12/28/2021)  leucovorin calcium 400 mg/m2 = 990 mg in dextrose 5 % 250 mL infusion, 400 mg/m2 = 990 mg, Intravenous, Clinic/[facility] 1 time, 15 of 20 cycles  Administration: 990 mg (6/16/2021), 990 mg (6/30/2021), 990 mg (7/14/2021), 980 mg (7/28/2021), 990 mg (8/11/2021), 990 mg (8/25/2021), 990 mg (9/8/2021), 990 mg (9/22/2021), 990 mg (10/6/2021), 990 mg (10/20/2021), 990 mg (11/3/2021), 990 mg (12/1/2021), 990 mg (12/15/2021), 990 mg (11/17/2021), 990 mg (12/28/2021)       UA checked on 01/10/2022    Review of Systems   Respiratory: Negative for cough and shortness of breath.    Cardiovascular: Negative for chest pain.   Gastrointestinal: Negative for abdominal pain and diarrhea.   Genitourinary: Negative for frequency.   Musculoskeletal: Negative for back pain.   Skin: Negative for rash.   Neurological: Negative for headaches.   Psychiatric/Behavioral: The patient is nervous/anxious.    All other systems reviewed and are negative.        Objective:     Weight:  Weight:  stable  Vitals:    01/18/22 1327   BP: 124/88   BP Location: Right arm   Patient Position: Sitting   Pulse: 73   Resp: 18   Temp: 97.9 °F (36.6 °C)   TempSrc: Oral   SpO2: 96%   Weight: 133.7 kg (294 lb 10.7 oz)   Height: 5' 6" (1.676 m)     Physical Exam  Vitals reviewed.   Constitutional:       Appearance: She is obese.   HENT:      Head: Normocephalic and atraumatic.      Comments: Hair thinning     Mouth/Throat:      Comments: Light white film to sides of the tongue, small ulcer inside left buccal  Eyes:      Conjunctiva/sclera: Conjunctivae normal.   Cardiovascular:      Rate and Rhythm: Normal rate and regular rhythm.      Pulses: Normal pulses.      Heart sounds: Normal heart sounds. "   Pulmonary:      Effort: Pulmonary effort is normal.      Breath sounds: Normal breath sounds.   Chest:   Breasts:      Right: No supraclavicular adenopathy.      Left: No supraclavicular adenopathy.       Abdominal:      General: Bowel sounds are normal.      Palpations: Abdomen is soft.   Musculoskeletal:         General: Normal range of motion.      Cervical back: Normal range of motion.   Lymphadenopathy:      Cervical: No cervical adenopathy.      Upper Body:      Right upper body: No supraclavicular adenopathy.      Left upper body: No supraclavicular adenopathy.      Lower Body: No right inguinal adenopathy. No left inguinal adenopathy.   Skin:     General: Skin is warm and dry.      Capillary Refill: Capillary refill takes less than 2 seconds.      Comments: Port a cath to RCW without erythema/exudate   Neurological:      Mental Status: She is alert and oriented to person, place, and time.   Psychiatric:         Mood and Affect: Mood normal.         Behavior: Behavior normal.         Thought Content: Thought content normal.         Judgment: Judgment normal.       Lab Results   Component Value Date    WBC 3.96 01/18/2022    RBC 4.73 01/18/2022    HGB 14.8 01/18/2022    HCT 45.7 01/18/2022    MCV 97 01/18/2022    MCH 31.3 (H) 01/18/2022    MCHC 32.4 01/18/2022    RDW 15.3 (H) 01/18/2022     01/18/2022    MPV 9.9 01/18/2022    GRAN 1.3 (L) 01/18/2022    GRAN 33.2 (L) 01/18/2022    LYMPH 1.7 01/18/2022    LYMPH 42.9 01/18/2022    MONO 0.6 01/18/2022    MONO 15.2 (H) 01/18/2022    EOS 0.3 01/18/2022    BASO 0.05 01/18/2022    EOSINOPHIL 6.6 01/18/2022    BASOPHIL 1.3 01/18/2022     ANC = 1315 (703)  Monos - 15.2%    CMP  Sodium   Date Value Ref Range Status   01/18/2022 141 136 - 145 mmol/L Final     Potassium   Date Value Ref Range Status   01/18/2022 4.1 3.5 - 5.1 mmol/L Final     Chloride   Date Value Ref Range Status   01/18/2022 111 (H) 95 - 110 mmol/L Final     CO2   Date Value Ref Range Status    01/18/2022 24 23 - 29 mmol/L Final     Glucose   Date Value Ref Range Status   01/18/2022 105 70 - 110 mg/dL Final     BUN   Date Value Ref Range Status   01/18/2022 7 6 - 20 mg/dL Final     Creatinine   Date Value Ref Range Status   01/18/2022 0.9 0.5 - 1.4 mg/dL Final     Calcium   Date Value Ref Range Status   01/18/2022 11.1 (H) 8.7 - 10.5 mg/dL Final     Total Protein   Date Value Ref Range Status   01/18/2022 7.4 6.0 - 8.4 g/dL Final     Albumin   Date Value Ref Range Status   01/18/2022 4.0 3.5 - 5.2 g/dL Final     Total Bilirubin   Date Value Ref Range Status   01/18/2022 0.5 0.1 - 1.0 mg/dL Final     Comment:     For infants and newborns, interpretation of results should be based  on gestational age, weight and in agreement with clinical  observations.    Premature Infant recommended reference ranges:  Up to 24 hours.............<8.0 mg/dL  Up to 48 hours............<12.0 mg/dL  3-5 days..................<15.0 mg/dL  6-29 days.................<15.0 mg/dL       Alkaline Phosphatase   Date Value Ref Range Status   01/18/2022 152 (H) 55 - 135 U/L Final     AST   Date Value Ref Range Status   01/18/2022 55 (H) 10 - 40 U/L Final     ALT   Date Value Ref Range Status   01/18/2022 80 (H) 10 - 44 U/L Final     Anion Gap   Date Value Ref Range Status   01/18/2022 6 (L) 8 - 16 mmol/L Final     eGFR if    Date Value Ref Range Status   01/18/2022 >60 >60 mL/min/1.73 m^2 Final     eGFR if non    Date Value Ref Range Status   01/18/2022 >60 >60 mL/min/1.73 m^2 Final     Comment:     Calculation used to obtain the estimated glomerular filtration  rate (eGFR) is the CKD-EPI equation.        CT PET:  01/06/2022  Impression:  No FDG PET/CT findings to suggest recurrent or metastatic disease.  The patient's left thyroid uptake is stable from prior exam.      Assessment:       1. Malignant neoplasm of sigmoid colon    2. Hypercalcemia    3. Mucositis due to chemotherapy    4. Fatty liver    5.  Body mass index (BMI) 45.0-49.9, adult    6. 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 Thyroid nodule          Plan:       1.  Proceed with Cycle 16 of FOLFIRI (minus 5FU bolus)/Avastin on Wednesday, 01/19/22 @ 1230.  2.  F/u in 2 weeks with Dr. Sanot, for evaluation prior to Cycle 17 of tx with CBC, CMP, Magnesium.  3.  UA every 3rd cycle:  Last UA 01/10/22 prior to C16; next due prior to Cycle 19.    4.  Follow with Dr. Moss in regards to hypercalcemia/thyroid nodule/multinodular goiter.  5.  Fatty liver - elevated liver enzymes; limit fatty foods; broil or grill.  6.  Kidney stone/hydronephrosis - maintain daily hydration  7.  Anxiety - follows with Dr. Palm.  8.  Mucositis - Homemade warm salt water/baking soda or [employer]'s 5 ml swish & swallow every 4 hours until clear.  9.  Nausea - Sancuso patch on 24 hours prior to chemo.  10. Call for any concerns.    Assessment/plan reviewed and approved by Dr. Santo.    Answers for HPI/ROS submitted by the patient on 1/17/2022  appetite change : No  unexpected weight change: No  mouth sores: Yes  visual disturbance: No  adenopathy: No

## 2022-01-18 NOTE — Clinical Note
C16 of tx tomorrow, 01/19/22 @ 1230; f/u with Dr. Santo in 2 wks (02/01/22) for evaluation with CBC, CMP, MG prior to Cycle 17

## 2022-01-19 ENCOUNTER — INFUSION (OUTPATIENT)
Dept: INFUSION THERAPY | Facility: HOSPITAL | Age: 52
End: 2022-01-19
Attending: INTERNAL MEDICINE
Payer: COMMERCIAL

## 2022-01-19 VITALS
DIASTOLIC BLOOD PRESSURE: 90 MMHG | SYSTOLIC BLOOD PRESSURE: 150 MMHG | HEART RATE: 82 BPM | RESPIRATION RATE: 18 BRPM | TEMPERATURE: 98 F

## 2022-01-19 DIAGNOSIS — C18.7 MALIGNANT NEOPLASM OF SIGMOID COLON: ICD-10-CM

## 2022-01-19 DIAGNOSIS — C77.2 METASTASIS TO INTESTINAL LYMPH NODE: Primary | ICD-10-CM

## 2022-01-19 PROCEDURE — 96416 CHEMO PROLONG INFUSE W/PUMP: CPT | Mod: PN

## 2022-01-19 PROCEDURE — 63600175 PHARM REV CODE 636 W HCPCS: Mod: PN | Performed by: NURSE PRACTITIONER

## 2022-01-19 PROCEDURE — 96415 CHEMO IV INFUSION ADDL HR: CPT | Mod: PN

## 2022-01-19 PROCEDURE — 96367 TX/PROPH/DG ADDL SEQ IV INF: CPT | Mod: PN

## 2022-01-19 PROCEDURE — 96417 CHEMO IV INFUS EACH ADDL SEQ: CPT | Mod: PN

## 2022-01-19 PROCEDURE — 25000003 PHARM REV CODE 250: Mod: PN | Performed by: NURSE PRACTITIONER

## 2022-01-19 PROCEDURE — 96413 CHEMO IV INFUSION 1 HR: CPT | Mod: PN

## 2022-01-19 PROCEDURE — 96368 THER/DIAG CONCURRENT INF: CPT | Mod: PN

## 2022-01-19 RX ORDER — LORAZEPAM 2 MG/ML
1 INJECTION INTRAMUSCULAR
Status: DISCONTINUED | OUTPATIENT
Start: 2022-01-19 | End: 2022-01-19 | Stop reason: HOSPADM

## 2022-01-19 RX ORDER — SODIUM CHLORIDE 0.9 % (FLUSH) 0.9 %
10 SYRINGE (ML) INJECTION
Status: DISCONTINUED | OUTPATIENT
Start: 2022-01-19 | End: 2022-01-19 | Stop reason: HOSPADM

## 2022-01-19 RX ORDER — ATROPINE SULFATE 0.4 MG/ML
0.4 INJECTION, SOLUTION ENDOTRACHEAL; INTRAMEDULLARY; INTRAMUSCULAR; INTRAVENOUS; SUBCUTANEOUS ONCE AS NEEDED
Status: DISCONTINUED | OUTPATIENT
Start: 2022-01-19 | End: 2022-01-19 | Stop reason: HOSPADM

## 2022-01-19 RX ORDER — HEPARIN 100 UNIT/ML
500 SYRINGE INTRAVENOUS
Status: DISCONTINUED | OUTPATIENT
Start: 2022-01-19 | End: 2022-01-19 | Stop reason: HOSPADM

## 2022-01-19 RX ADMIN — SODIUM CHLORIDE 440 MG: 0.9 INJECTION, SOLUTION INTRAVENOUS at 01:01

## 2022-01-19 RX ADMIN — PROMETHAZINE HYDROCHLORIDE 6.25 MG: 25 INJECTION INTRAMUSCULAR; INTRAVENOUS at 01:01

## 2022-01-19 RX ADMIN — BEVACIZUMAB 600 MG: 400 INJECTION, SOLUTION INTRAVENOUS at 01:01

## 2022-01-19 RX ADMIN — FLUOROURACIL 5930 MG: 50 INJECTION, SOLUTION INTRAVENOUS at 03:01

## 2022-01-19 RX ADMIN — SODIUM CHLORIDE: 0.9 INJECTION, SOLUTION INTRAVENOUS at 12:01

## 2022-01-19 RX ADMIN — DEXAMETHASONE SODIUM PHOSPHATE: 10 INJECTION, SOLUTION INTRAMUSCULAR; INTRAVENOUS at 12:01

## 2022-01-19 RX ADMIN — LEUCOVORIN CALCIUM 990 MG: 200 INJECTION, POWDER, LYOPHILIZED, FOR SOLUTION INTRAMUSCULAR; INTRAVENOUS at 02:01

## 2022-01-19 NOTE — PLAN OF CARE
"Tolerated Avastin/FOLFIRI well.  No reactions noted.  No questions or concerns at this time.  5-FU pump infusing upon d/c.  Instructed pt to check pump screen twice daily for "RUN", RTC this Friday @1:45p for pump d/c, verbalized understanding,  pump setting verified by 2 RNs.  Ambulated off unit in NAD.  "

## 2022-01-21 ENCOUNTER — PATIENT MESSAGE (OUTPATIENT)
Dept: HEMATOLOGY/ONCOLOGY | Facility: CLINIC | Age: 52
End: 2022-01-21
Payer: COMMERCIAL

## 2022-01-21 ENCOUNTER — INFUSION (OUTPATIENT)
Dept: INFUSION THERAPY | Facility: HOSPITAL | Age: 52
End: 2022-01-21
Attending: INTERNAL MEDICINE
Payer: COMMERCIAL

## 2022-01-21 VITALS
TEMPERATURE: 98 F | RESPIRATION RATE: 17 BRPM | DIASTOLIC BLOOD PRESSURE: 78 MMHG | HEART RATE: 84 BPM | SYSTOLIC BLOOD PRESSURE: 121 MMHG

## 2022-01-21 DIAGNOSIS — C77.2 METASTASIS TO INTESTINAL LYMPH NODE: Primary | ICD-10-CM

## 2022-01-21 DIAGNOSIS — C18.7 MALIGNANT NEOPLASM OF SIGMOID COLON: ICD-10-CM

## 2022-01-21 PROCEDURE — 96523 IRRIG DRUG DELIVERY DEVICE: CPT | Mod: PN

## 2022-01-21 PROCEDURE — 63600175 PHARM REV CODE 636 W HCPCS: Mod: PN | Performed by: NURSE PRACTITIONER

## 2022-01-21 RX ORDER — HEPARIN 100 UNIT/ML
500 SYRINGE INTRAVENOUS
Status: DISCONTINUED | OUTPATIENT
Start: 2022-01-21 | End: 2022-01-21 | Stop reason: HOSPADM

## 2022-01-21 RX ORDER — SODIUM CHLORIDE 0.9 % (FLUSH) 0.9 %
10 SYRINGE (ML) INJECTION
Status: DISCONTINUED | OUTPATIENT
Start: 2022-01-21 | End: 2022-01-21 | Stop reason: HOSPADM

## 2022-01-21 RX ADMIN — Medication 500 UNITS: at 01:01

## 2022-01-31 ENCOUNTER — LAB VISIT (OUTPATIENT)
Dept: LAB | Facility: HOSPITAL | Age: 52
End: 2022-01-31
Attending: STUDENT IN AN ORGANIZED HEALTH CARE EDUCATION/TRAINING PROGRAM
Payer: COMMERCIAL

## 2022-01-31 DIAGNOSIS — C18.7 MALIGNANT NEOPLASM OF SIGMOID COLON: ICD-10-CM

## 2022-01-31 LAB
ALBUMIN SERPL BCP-MCNC: 3.7 G/DL (ref 3.5–5.2)
ALP SERPL-CCNC: 124 U/L (ref 55–135)
ALT SERPL W/O P-5'-P-CCNC: 73 U/L (ref 10–44)
ANION GAP SERPL CALC-SCNC: 7 MMOL/L (ref 8–16)
AST SERPL-CCNC: 42 U/L (ref 10–40)
BASOPHILS # BLD AUTO: 0.03 K/UL (ref 0–0.2)
BASOPHILS NFR BLD: 0.8 % (ref 0–1.9)
BILIRUB SERPL-MCNC: 0.5 MG/DL (ref 0.1–1)
BUN SERPL-MCNC: 9 MG/DL (ref 6–20)
CALCIUM SERPL-MCNC: 10.6 MG/DL (ref 8.7–10.5)
CHLORIDE SERPL-SCNC: 111 MMOL/L (ref 95–110)
CO2 SERPL-SCNC: 23 MMOL/L (ref 23–29)
CREAT SERPL-MCNC: 0.8 MG/DL (ref 0.5–1.4)
DIFFERENTIAL METHOD: ABNORMAL
EOSINOPHIL # BLD AUTO: 0.2 K/UL (ref 0–0.5)
EOSINOPHIL NFR BLD: 6.2 % (ref 0–8)
ERYTHROCYTE [DISTWIDTH] IN BLOOD BY AUTOMATED COUNT: 14.8 % (ref 11.5–14.5)
EST. GFR  (AFRICAN AMERICAN): >60 ML/MIN/1.73 M^2
EST. GFR  (NON AFRICAN AMERICAN): >60 ML/MIN/1.73 M^2
GLUCOSE SERPL-MCNC: 106 MG/DL (ref 70–110)
HCT VFR BLD AUTO: 41.3 % (ref 37–48.5)
HGB BLD-MCNC: 13.8 G/DL (ref 12–16)
IMM GRANULOCYTES # BLD AUTO: 0.01 K/UL (ref 0–0.04)
IMM GRANULOCYTES NFR BLD AUTO: 0.3 % (ref 0–0.5)
LYMPHOCYTES # BLD AUTO: 1.4 K/UL (ref 1–4.8)
LYMPHOCYTES NFR BLD: 35.8 % (ref 18–48)
MAGNESIUM SERPL-MCNC: 2 MG/DL (ref 1.6–2.6)
MCH RBC QN AUTO: 31.9 PG (ref 27–31)
MCHC RBC AUTO-ENTMCNC: 33.4 G/DL (ref 32–36)
MCV RBC AUTO: 96 FL (ref 82–98)
MONOCYTES # BLD AUTO: 0.4 K/UL (ref 0.3–1)
MONOCYTES NFR BLD: 11.4 % (ref 4–15)
NEUTROPHILS # BLD AUTO: 1.8 K/UL (ref 1.8–7.7)
NEUTROPHILS NFR BLD: 45.5 % (ref 38–73)
NRBC BLD-RTO: 0 /100 WBC
PLATELET # BLD AUTO: 228 K/UL (ref 150–450)
PMV BLD AUTO: 9.7 FL (ref 9.2–12.9)
POTASSIUM SERPL-SCNC: 4.1 MMOL/L (ref 3.5–5.1)
PROT SERPL-MCNC: 6.7 G/DL (ref 6–8.4)
RBC # BLD AUTO: 4.32 M/UL (ref 4–5.4)
SODIUM SERPL-SCNC: 141 MMOL/L (ref 136–145)
WBC # BLD AUTO: 3.86 K/UL (ref 3.9–12.7)

## 2022-01-31 PROCEDURE — 80053 COMPREHEN METABOLIC PANEL: CPT | Mod: PN | Performed by: NURSE PRACTITIONER

## 2022-01-31 PROCEDURE — 83735 ASSAY OF MAGNESIUM: CPT | Mod: PN | Performed by: NURSE PRACTITIONER

## 2022-01-31 PROCEDURE — 36415 COLL VENOUS BLD VENIPUNCTURE: CPT | Mod: PN | Performed by: NURSE PRACTITIONER

## 2022-01-31 PROCEDURE — 85025 COMPLETE CBC W/AUTO DIFF WBC: CPT | Mod: PN | Performed by: NURSE PRACTITIONER

## 2022-02-02 ENCOUNTER — OFFICE VISIT (OUTPATIENT)
Dept: PSYCHIATRY | Facility: CLINIC | Age: 52
End: 2022-02-02
Payer: COMMERCIAL

## 2022-02-02 ENCOUNTER — PATIENT MESSAGE (OUTPATIENT)
Dept: PSYCHIATRY | Facility: CLINIC | Age: 52
End: 2022-02-02
Payer: COMMERCIAL

## 2022-02-02 ENCOUNTER — DOCUMENTATION ONLY (OUTPATIENT)
Dept: SURGERY | Facility: CLINIC | Age: 52
End: 2022-02-02
Payer: COMMERCIAL

## 2022-02-02 ENCOUNTER — INFUSION (OUTPATIENT)
Dept: INFUSION THERAPY | Facility: HOSPITAL | Age: 52
End: 2022-02-02
Attending: INTERNAL MEDICINE
Payer: COMMERCIAL

## 2022-02-02 ENCOUNTER — OFFICE VISIT (OUTPATIENT)
Dept: HEMATOLOGY/ONCOLOGY | Facility: CLINIC | Age: 52
End: 2022-02-02
Payer: COMMERCIAL

## 2022-02-02 VITALS
HEART RATE: 88 BPM | RESPIRATION RATE: 18 BRPM | BODY MASS INDEX: 47.4 KG/M2 | OXYGEN SATURATION: 96 % | SYSTOLIC BLOOD PRESSURE: 122 MMHG | TEMPERATURE: 98 F | DIASTOLIC BLOOD PRESSURE: 78 MMHG | WEIGHT: 293 LBS

## 2022-02-02 VITALS
BODY MASS INDEX: 46.84 KG/M2 | HEART RATE: 86 BPM | TEMPERATURE: 98 F | DIASTOLIC BLOOD PRESSURE: 94 MMHG | RESPIRATION RATE: 18 BRPM | SYSTOLIC BLOOD PRESSURE: 146 MMHG | HEIGHT: 66 IN | WEIGHT: 291.44 LBS

## 2022-02-02 DIAGNOSIS — F33.1 MODERATE EPISODE OF RECURRENT MAJOR DEPRESSIVE DISORDER: Primary | ICD-10-CM

## 2022-02-02 DIAGNOSIS — Z12.39 ENCOUNTER FOR SCREENING FOR MALIGNANT NEOPLASM OF BREAST, UNSPECIFIED SCREENING MODALITY: ICD-10-CM

## 2022-02-02 DIAGNOSIS — C18.7 MALIGNANT NEOPLASM OF SIGMOID COLON: ICD-10-CM

## 2022-02-02 DIAGNOSIS — C80.1 ANXIETY ASSOCIATED WITH CANCER DIAGNOSIS: ICD-10-CM

## 2022-02-02 DIAGNOSIS — F41.1 ANXIETY ASSOCIATED WITH CANCER DIAGNOSIS: ICD-10-CM

## 2022-02-02 DIAGNOSIS — C18.7 MALIGNANT NEOPLASM OF SIGMOID COLON: Primary | ICD-10-CM

## 2022-02-02 DIAGNOSIS — C77.2 METASTASIS TO INTESTINAL LYMPH NODE: Primary | ICD-10-CM

## 2022-02-02 PROCEDURE — 96367 TX/PROPH/DG ADDL SEQ IV INF: CPT | Mod: PN

## 2022-02-02 PROCEDURE — 90834 PSYTX W PT 45 MINUTES: CPT | Mod: S$GLB,,, | Performed by: PSYCHOLOGIST

## 2022-02-02 PROCEDURE — 1160F PR REVIEW ALL MEDS BY PRESCRIBER/CLIN PHARMACIST DOCUMENTED: ICD-10-PCS | Mod: CPTII,S$GLB,, | Performed by: STUDENT IN AN ORGANIZED HEALTH CARE EDUCATION/TRAINING PROGRAM

## 2022-02-02 PROCEDURE — 3008F PR BODY MASS INDEX (BMI) DOCUMENTED: ICD-10-PCS | Mod: CPTII,S$GLB,, | Performed by: STUDENT IN AN ORGANIZED HEALTH CARE EDUCATION/TRAINING PROGRAM

## 2022-02-02 PROCEDURE — 99999 PR PBB SHADOW E&M-EST. PATIENT-LVL IV: CPT | Mod: PBBFAC,,, | Performed by: STUDENT IN AN ORGANIZED HEALTH CARE EDUCATION/TRAINING PROGRAM

## 2022-02-02 PROCEDURE — 3078F PR MOST RECENT DIASTOLIC BLOOD PRESSURE < 80 MM HG: ICD-10-PCS | Mod: CPTII,S$GLB,, | Performed by: STUDENT IN AN ORGANIZED HEALTH CARE EDUCATION/TRAINING PROGRAM

## 2022-02-02 PROCEDURE — 3074F SYST BP LT 130 MM HG: CPT | Mod: CPTII,S$GLB,, | Performed by: STUDENT IN AN ORGANIZED HEALTH CARE EDUCATION/TRAINING PROGRAM

## 2022-02-02 PROCEDURE — 1159F PR MEDICATION LIST DOCUMENTED IN MEDICAL RECORD: ICD-10-PCS | Mod: CPTII,S$GLB,, | Performed by: STUDENT IN AN ORGANIZED HEALTH CARE EDUCATION/TRAINING PROGRAM

## 2022-02-02 PROCEDURE — 99999 PR PBB SHADOW E&M-EST. PATIENT-LVL IV: ICD-10-PCS | Mod: PBBFAC,,, | Performed by: STUDENT IN AN ORGANIZED HEALTH CARE EDUCATION/TRAINING PROGRAM

## 2022-02-02 PROCEDURE — 3078F DIAST BP <80 MM HG: CPT | Mod: CPTII,S$GLB,, | Performed by: STUDENT IN AN ORGANIZED HEALTH CARE EDUCATION/TRAINING PROGRAM

## 2022-02-02 PROCEDURE — 96416 CHEMO PROLONG INFUSE W/PUMP: CPT | Mod: PN

## 2022-02-02 PROCEDURE — 25000003 PHARM REV CODE 250: Mod: PN | Performed by: STUDENT IN AN ORGANIZED HEALTH CARE EDUCATION/TRAINING PROGRAM

## 2022-02-02 PROCEDURE — 63600175 PHARM REV CODE 636 W HCPCS: Mod: PN | Performed by: STUDENT IN AN ORGANIZED HEALTH CARE EDUCATION/TRAINING PROGRAM

## 2022-02-02 PROCEDURE — 99999 PR PBB SHADOW E&M-EST. PATIENT-LVL I: CPT | Mod: PBBFAC,,, | Performed by: PSYCHOLOGIST

## 2022-02-02 PROCEDURE — 96417 CHEMO IV INFUS EACH ADDL SEQ: CPT | Mod: PN

## 2022-02-02 PROCEDURE — 90834 PR PSYCHOTHERAPY W/PATIENT, 45 MIN: ICD-10-PCS | Mod: S$GLB,,, | Performed by: PSYCHOLOGIST

## 2022-02-02 PROCEDURE — 3074F PR MOST RECENT SYSTOLIC BLOOD PRESSURE < 130 MM HG: ICD-10-PCS | Mod: CPTII,S$GLB,, | Performed by: STUDENT IN AN ORGANIZED HEALTH CARE EDUCATION/TRAINING PROGRAM

## 2022-02-02 PROCEDURE — 1160F RVW MEDS BY RX/DR IN RCRD: CPT | Mod: CPTII,S$GLB,, | Performed by: STUDENT IN AN ORGANIZED HEALTH CARE EDUCATION/TRAINING PROGRAM

## 2022-02-02 PROCEDURE — 99999 PR PBB SHADOW E&M-EST. PATIENT-LVL I: ICD-10-PCS | Mod: PBBFAC,,, | Performed by: PSYCHOLOGIST

## 2022-02-02 PROCEDURE — 96368 THER/DIAG CONCURRENT INF: CPT | Mod: PN

## 2022-02-02 PROCEDURE — 96413 CHEMO IV INFUSION 1 HR: CPT | Mod: PN

## 2022-02-02 PROCEDURE — 99215 OFFICE O/P EST HI 40 MIN: CPT | Mod: S$GLB,,, | Performed by: STUDENT IN AN ORGANIZED HEALTH CARE EDUCATION/TRAINING PROGRAM

## 2022-02-02 PROCEDURE — 96415 CHEMO IV INFUSION ADDL HR: CPT | Mod: PN

## 2022-02-02 PROCEDURE — 99215 PR OFFICE/OUTPT VISIT, EST, LEVL V, 40-54 MIN: ICD-10-PCS | Mod: S$GLB,,, | Performed by: STUDENT IN AN ORGANIZED HEALTH CARE EDUCATION/TRAINING PROGRAM

## 2022-02-02 PROCEDURE — 3008F BODY MASS INDEX DOCD: CPT | Mod: CPTII,S$GLB,, | Performed by: STUDENT IN AN ORGANIZED HEALTH CARE EDUCATION/TRAINING PROGRAM

## 2022-02-02 PROCEDURE — 1159F MED LIST DOCD IN RCRD: CPT | Mod: CPTII,S$GLB,, | Performed by: STUDENT IN AN ORGANIZED HEALTH CARE EDUCATION/TRAINING PROGRAM

## 2022-02-02 RX ORDER — SODIUM CHLORIDE 0.9 % (FLUSH) 0.9 %
10 SYRINGE (ML) INJECTION
Status: DISCONTINUED | OUTPATIENT
Start: 2022-02-02 | End: 2022-02-02 | Stop reason: HOSPADM

## 2022-02-02 RX ORDER — HEPARIN 100 UNIT/ML
500 SYRINGE INTRAVENOUS
Status: CANCELLED | OUTPATIENT
Start: 2022-02-04

## 2022-02-02 RX ORDER — LORAZEPAM 2 MG/ML
1 INJECTION INTRAMUSCULAR
Status: CANCELLED | OUTPATIENT
Start: 2022-02-02 | End: 2022-02-02

## 2022-02-02 RX ORDER — LORAZEPAM 2 MG/ML
1 INJECTION INTRAMUSCULAR
Status: DISCONTINUED | OUTPATIENT
Start: 2022-02-02 | End: 2022-02-02 | Stop reason: HOSPADM

## 2022-02-02 RX ORDER — SODIUM CHLORIDE 0.9 % (FLUSH) 0.9 %
10 SYRINGE (ML) INJECTION
Status: CANCELLED | OUTPATIENT
Start: 2022-02-04

## 2022-02-02 RX ORDER — HEPARIN 100 UNIT/ML
500 SYRINGE INTRAVENOUS
Status: CANCELLED | OUTPATIENT
Start: 2022-02-02

## 2022-02-02 RX ORDER — ATROPINE SULFATE 0.4 MG/ML
0.4 INJECTION, SOLUTION ENDOTRACHEAL; INTRAMEDULLARY; INTRAMUSCULAR; INTRAVENOUS; SUBCUTANEOUS ONCE AS NEEDED
Status: DISCONTINUED | OUTPATIENT
Start: 2022-02-02 | End: 2022-02-02 | Stop reason: HOSPADM

## 2022-02-02 RX ORDER — SODIUM CHLORIDE 0.9 % (FLUSH) 0.9 %
10 SYRINGE (ML) INJECTION
Status: CANCELLED | OUTPATIENT
Start: 2022-02-02

## 2022-02-02 RX ORDER — ATROPINE SULFATE 0.4 MG/ML
0.4 INJECTION, SOLUTION ENDOTRACHEAL; INTRAMEDULLARY; INTRAMUSCULAR; INTRAVENOUS; SUBCUTANEOUS ONCE AS NEEDED
Status: CANCELLED | OUTPATIENT
Start: 2022-02-02

## 2022-02-02 RX ADMIN — SODIUM CHLORIDE 440 MG: 0.9 INJECTION, SOLUTION INTRAVENOUS at 02:02

## 2022-02-02 RX ADMIN — BEVACIZUMAB 600 MG: 400 INJECTION, SOLUTION INTRAVENOUS at 01:02

## 2022-02-02 RX ADMIN — SODIUM CHLORIDE: 0.9 INJECTION, SOLUTION INTRAVENOUS at 12:02

## 2022-02-02 RX ADMIN — PROMETHAZINE HYDROCHLORIDE 6.25 MG: 25 INJECTION INTRAMUSCULAR; INTRAVENOUS at 01:02

## 2022-02-02 RX ADMIN — PALONOSETRON 0.25 MG: 0.05 INJECTION, SOLUTION INTRAVENOUS at 12:02

## 2022-02-02 RX ADMIN — LEUCOVORIN CALCIUM 990 MG: 350 INJECTION, POWDER, LYOPHILIZED, FOR SOLUTION INTRAMUSCULAR; INTRAVENOUS at 02:02

## 2022-02-02 RX ADMIN — FLUOROURACIL 5930 MG: 50 INJECTION, SOLUTION INTRAVENOUS at 04:02

## 2022-02-02 NOTE — PROGRESS NOTES
PSYCHO-ONCOLOGY NOTE/ Individual Psychotherapy     Date: 2/2/2022   Site:  [facility]      Therapeutic Intervention: Met with patient.  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    Anxiety    Insomnia    Insomnia    Anxiety    Other constipation    Nausea    Generalized anxiety disorder with panic attacks    Chemotherapy adverse reaction    Mucositis due to chemotherapy    Morbid obesity    Secondary adenocarcinoma of lymph node    Thyroid nodule    Hypercalcemia    Transaminitis    Dysuria    Chemotherapy-induced neutropenia       Chief complaint/reason for encounter: adjustment to illness, depression and anxiety      Met with patient to evaluate psychosocial adaptation to diagnosis/treatment/survivorship of metastatic colon cancer    Current Medications  Current Outpatient Medications   Medication    acetaminophen (TYLENOL) 500 MG tablet    buPROPion (WELLBUTRIN SR) 150 MG TBSR 12 hr tablet    busPIRone (BUSPAR) 10 MG tablet    cyclobenzaprine (FLEXERIL) 10 MG tablet    duke's soln (benadryl 30 mL, mylanta 30 mL, LIDOcaine 30 mL, nystatin 30 mL) 120mL    ipratropium-albuteroL (COMBIVENT)  mcg/actuation inhaler    Lactobacillus rhamnosus GG (CULTURELLE) 10 billion cell capsule    lactulose (CHRONULAC) 10 gram/15 mL solution    levothyroxine (SYNTHROID) 200 MCG tablet    LIDOcaine-prilocaine (EMLA) cream    multivitamin (THERAGRAN) per  tablet    promethazine (PHENERGAN) 12.5 MG Tab    tamsulosin (FLOMAX) 0.4 mg Cap    traMADoL (ULTRAM) 50 mg tablet    traZODone (DESYREL) 50 MG tablet     No current facility-administered medications for this visit.     Facility-Administered Medications Ordered in Other Visits   Medication Frequency    atropine injection 0.4 mg Once PRN    bevacizumab (AVASTIN) 600 mg in sodium chloride 0.9% 100 mL chemo infusion 1 time in Clinic/HOD    fluorouraciL 2,400 mg/m2 = 5,930 mg in sodium chloride 0.9% 240 mL chemo infusion over 46 hr    irinotecan (CAMPTOSAR) 440 mg in sodium chloride 0.9% 500 mL chemo infusion 1 time in Clinic/HOD    leucovorin calcium 400 mg/m2 = 990 mg in dextrose 5 % 250 mL infusion 1 time in Clinic/HOD    LORazepam injection 1 mg 1 time in Clinic/HOD    sodium chloride 0.9% flush 10 mL PRN       Objective:  Gail Mckinnon arrived promptly for the session and met during her scheduled infusion. The patient was fully cooperative throughout the session.  Appearance: age appropriate, casually  dressed, well groomed  Behavior/Cooperation: friendly and cooperative  Speech: normal in rate, volume, and tone and appropriate quality, quantity and organization of sentences  Mood: steady, depressed  Affect: mood congruent and appropriate  Thought Process: goal-directed, logical  Thought Content: normal,  No delusions or paranoia; did not appear to be responding to internal stimuli during the session  Orientation: grossly intact  Memory: grossly intact  Attention Span/Concentration: Attends to session without distraction; reports no difficulty  Fund of Knowledge: average  Estimate of Intelligence: average from verbal skills and history  Cognition: grossly intact  Insight: patient has awareness of illness; good insight into own behavior and behavior of others  Judgment: the patient's behavior is adequate to circumstances    Interval history and content of current session: Explored recent  psychosocial stressors (son's wedding, new home, additional work responsibilites, etc.) and current adaptation to disease and treatment status. Reports to be coping with moderate difficulty-stating that she has become so overwhelmed that she has ceased to use many of the strategies discussed in prior visits. Discussed the implications of said behavior and encouraged re-engagement in active coping strategies. Assessed cognitive response, paying particular attention to negative intrusive thoughts of a persistent and detrimental nature. Thoughts of this type are in evidence with moderate distress and are associated with the belief that she should be able to be active/independent at the level she was prior to illness/tx. Provided cognitive behavioral therapy to address negative cognitions. Encouraged continued use of behavioral activation strategies and greater engagement with her social support system.     Risk parameters:   Patient reports no suicidal ideation  Patient reports no homicidal ideation  Patient reports no self-injurious behavior  Patient reports no violent behavior   Safety needs:  None at this time      Verbal deficits: None     Patient's response to intervention:The patient's response to intervention is accepting, reluctant.     Progress toward goals and other mental status changes:  The patient's progress toward goals is fair .      Progress to date:Progress - Ongoing, but Slow      Goals from last visit: Attempted, partially met        Patient Strengths: verbal, motivated, intelligent, successful, good social support, good insight, commitment to wellness, strong cultural traditions        Treatment Plan:individual psychotherapy  ? Target symptoms: depression, anxiety , adjustment  ? Why chosen therapy is appropriate versus another modality: relevant to diagnosis, patient responds to this modality, evidence based practice  ? Outcome monitoring methods: self-report, observation, checklist/rating  scale  ? Therapeutic intervention type: insight oriented psychotherapy, supportive psychotherapy  ? Prognosis: Good                            Behavioral goals:               Exercise: continued/increased as per physician recommendations              Stress management: appropriate boundaries and accepting aid when needed              Social engagement: continued, as per CDC COVID-19 guidelines              Therapy: re-engage in thought logging/cognitive restructuring, grounding exercises    Return to clinic: 2 weeks     Length of Service (minutes direct face-to-face contact): 45    Diagnosis:     ICD-10-CM ICD-9-CM   1. Moderate episode of recurrent major depressive disorder  F33.1 296.32   2. Anxiety associated with cancer diagnosis  F41.1 300.09    C80.1                 Maximo Peters License #[id number]  [place] License #[id number]

## 2022-02-02 NOTE — PROGRESS NOTES
[facility]  Colon, Rectal and Anal Malignancies  Multi-disciplinary Tumor Board     [address]  Tel: [phone]  Fax: [phone]  [email or web address]     Patient name: Gail Mckinnon   YOB: 1970   MRN: [id number]    Date of discussion: 02/02/2022    Thank you for referring Ms. Mckinnon to our care here at [facility]. Please allow us to summarize our review of records and recommendations.    The following disciplines were present for the discussion:    Colon and Rectal Surgery   Medical Oncology   Radiation Oncology   Pathology   Radiology    Endoscopy reviewed:    Date performed: 2/12/2020   Yes, complete evaluation of colon and rectum performed    CT Chest, Abdomen and Pelvis    Date performed: 2/17/2021    Performed at our facility: Yes   Metastatic disease present: Yes, biopsy confirmed    MRI Rectal Cancer Protocol   Date performed: Not performed    Performed at our facility: Not performed   Tumor location in the rectum: Not performed   Sphincter involvement: Not performed   Pretreatment circumferential resection margin status: Not performed    Pathology reviewed:    Date pathology finalized: 3/31/2020   Yes, slides reviewed     Pre-treatment CEA:   Date performed: 2/17/2021   CEA Level: 1.3    Pre-treatment Clinical Stage:   T4b - Tumor invades or is adherent to adjacent organs or structures   N+ - Sam disease is suspected   M1 - Metastasis suspected or confirmed    Based on our review of the current information we have made the following recommendations:  Summary: 51F c locally advanced sigmoid adenocarcinoma s/p sigmoidectomy with en bloc hysterectomy/BSO and adjuvant chemotherapy with surveillance imaging notable for retroperitoneal lymphadenopathy. Subsequently started on FOLFIRI and Avastin with resolution of lymphadenopathy on imaging. Recommend continuing chemotherapy.     Additional laboratory,  imaging, or endoscopic studies   none    Therapies   Continue Chemotherapy    Clinical research study eligibility - No    Referrals   none    Please do not hesitate to contact us for any questions.

## 2022-02-02 NOTE — PLAN OF CARE
Problem: Fatigue  Goal: Improved Activity Tolerance  Intervention: Promote Energy Conservation  Flowsheets (Taken 2/2/2022 1645)  Fatigue Management:   activity schedule adjusted   frequent rest breaks encouraged   paced activity encouraged   fatigue-related activity identified  Sleep/Rest Enhancement:   relaxation techniques promoted   regular sleep/rest pattern promoted  Activity Management:   Ambulated -L4   Ambulated in reyes - L4     Problem: Adult Inpatient Plan of Care  Goal: Optimal Comfort and Wellbeing  Intervention: Provide Person-Centered Care  Flowsheets (Taken 2/2/2022 1645)  Trust Relationship/Rapport: care explained     Problem: Adult Inpatient Plan of Care  Goal: Patient-Specific Goal (Individualization)  Outcome: Ongoing, Progressing  Flowsheets (Taken 2/2/2022 1645)  Individualized Care Needs: Recliner, blanket  Anxieties, Fears or Concerns: None  Patient-Specific Goals (Include Timeframe): Infection prevention during treatment.  Tolerated treatment with no known distress.  Ambulated from infusion center with steady gait.

## 2022-02-03 ENCOUNTER — PATIENT MESSAGE (OUTPATIENT)
Dept: HEMATOLOGY/ONCOLOGY | Facility: CLINIC | Age: 52
End: 2022-02-03
Payer: COMMERCIAL

## 2022-02-04 ENCOUNTER — INFUSION (OUTPATIENT)
Dept: INFUSION THERAPY | Facility: HOSPITAL | Age: 52
End: 2022-02-04
Attending: INTERNAL MEDICINE
Payer: COMMERCIAL

## 2022-02-04 VITALS
TEMPERATURE: 98 F | SYSTOLIC BLOOD PRESSURE: 146 MMHG | RESPIRATION RATE: 18 BRPM | DIASTOLIC BLOOD PRESSURE: 94 MMHG | HEART RATE: 90 BPM

## 2022-02-04 DIAGNOSIS — C77.2 METASTASIS TO INTESTINAL LYMPH NODE: Primary | ICD-10-CM

## 2022-02-04 DIAGNOSIS — C18.7 MALIGNANT NEOPLASM OF SIGMOID COLON: ICD-10-CM

## 2022-02-04 PROCEDURE — 63600175 PHARM REV CODE 636 W HCPCS: Mod: PN | Performed by: STUDENT IN AN ORGANIZED HEALTH CARE EDUCATION/TRAINING PROGRAM

## 2022-02-04 PROCEDURE — 96523 IRRIG DRUG DELIVERY DEVICE: CPT | Mod: PN

## 2022-02-04 RX ORDER — HEPARIN 100 UNIT/ML
500 SYRINGE INTRAVENOUS
Status: DISCONTINUED | OUTPATIENT
Start: 2022-02-04 | End: 2022-02-04 | Stop reason: HOSPADM

## 2022-02-04 RX ORDER — SODIUM CHLORIDE 0.9 % (FLUSH) 0.9 %
10 SYRINGE (ML) INJECTION
Status: DISCONTINUED | OUTPATIENT
Start: 2022-02-04 | End: 2022-02-04 | Stop reason: HOSPADM

## 2022-02-04 RX ADMIN — Medication 500 UNITS: at 02:02

## 2022-02-07 ENCOUNTER — TELEPHONE (OUTPATIENT)
Dept: INFUSION THERAPY | Facility: HOSPITAL | Age: 52
End: 2022-02-07
Payer: COMMERCIAL

## 2022-02-09 ENCOUNTER — TELEPHONE (OUTPATIENT)
Dept: HEMATOLOGY/ONCOLOGY | Facility: CLINIC | Age: 52
End: 2022-02-09
Payer: COMMERCIAL

## 2022-02-11 ENCOUNTER — PATIENT MESSAGE (OUTPATIENT)
Dept: HEMATOLOGY/ONCOLOGY | Facility: CLINIC | Age: 52
End: 2022-02-11
Payer: COMMERCIAL

## 2022-02-14 ENCOUNTER — PATIENT MESSAGE (OUTPATIENT)
Dept: HEMATOLOGY/ONCOLOGY | Facility: CLINIC | Age: 52
End: 2022-02-14
Payer: COMMERCIAL

## 2022-02-15 ENCOUNTER — PATIENT MESSAGE (OUTPATIENT)
Dept: ADMINISTRATIVE | Facility: HOSPITAL | Age: 52
End: 2022-02-15
Payer: COMMERCIAL

## 2022-02-15 ENCOUNTER — PATIENT OUTREACH (OUTPATIENT)
Dept: ADMINISTRATIVE | Facility: HOSPITAL | Age: 52
End: 2022-02-15
Payer: COMMERCIAL

## 2022-02-15 NOTE — PROGRESS NOTES
Uncontrolled BP >140/90   DUE FOR OFFICE VIIST. MAMMO SCHEDULED  BP Readings from Last 3 Encounters:   02/04/22 (!) 146/94   02/02/22 (!) 146/94   02/02/22 122/78

## 2022-02-17 NOTE — TELEPHONE ENCOUNTER
inform pt via phone that pt needs to make an appt for an annual check up.  I last saw this patient in 2019.

## 2022-02-21 ENCOUNTER — CLINICAL SUPPORT (OUTPATIENT)
Dept: HEMATOLOGY/ONCOLOGY | Facility: CLINIC | Age: 52
End: 2022-02-21
Payer: COMMERCIAL

## 2022-02-21 ENCOUNTER — PATIENT MESSAGE (OUTPATIENT)
Dept: HEMATOLOGY/ONCOLOGY | Facility: CLINIC | Age: 52
End: 2022-02-21

## 2022-02-21 ENCOUNTER — OFFICE VISIT (OUTPATIENT)
Dept: HEMATOLOGY/ONCOLOGY | Facility: CLINIC | Age: 52
End: 2022-02-21
Payer: COMMERCIAL

## 2022-02-21 ENCOUNTER — LAB VISIT (OUTPATIENT)
Dept: LAB | Facility: HOSPITAL | Age: 52
End: 2022-02-21
Attending: STUDENT IN AN ORGANIZED HEALTH CARE EDUCATION/TRAINING PROGRAM
Payer: COMMERCIAL

## 2022-02-21 VITALS
BODY MASS INDEX: 47.09 KG/M2 | RESPIRATION RATE: 18 BRPM | TEMPERATURE: 98 F | WEIGHT: 293 LBS | DIASTOLIC BLOOD PRESSURE: 88 MMHG | SYSTOLIC BLOOD PRESSURE: 118 MMHG | HEART RATE: 80 BPM | HEIGHT: 66 IN | OXYGEN SATURATION: 98 %

## 2022-02-21 DIAGNOSIS — T45.1X5A CHEMOTHERAPY-INDUCED NEUTROPENIA: ICD-10-CM

## 2022-02-21 DIAGNOSIS — R74.01 TRANSAMINITIS: ICD-10-CM

## 2022-02-21 DIAGNOSIS — D70.1 CHEMOTHERAPY-INDUCED NEUTROPENIA: ICD-10-CM

## 2022-02-21 DIAGNOSIS — E83.52 HYPERCALCEMIA: ICD-10-CM

## 2022-02-21 DIAGNOSIS — D64.9 NORMOCYTIC ANEMIA: ICD-10-CM

## 2022-02-21 DIAGNOSIS — C18.7 MALIGNANT NEOPLASM OF SIGMOID COLON: ICD-10-CM

## 2022-02-21 DIAGNOSIS — C18.7 MALIGNANT NEOPLASM OF SIGMOID COLON: Primary | ICD-10-CM

## 2022-02-21 LAB
ALBUMIN SERPL BCP-MCNC: 3.7 G/DL (ref 3.5–5.2)
ALP SERPL-CCNC: 150 U/L (ref 55–135)
ALT SERPL W/O P-5'-P-CCNC: 85 U/L (ref 10–44)
ANION GAP SERPL CALC-SCNC: 10 MMOL/L (ref 8–16)
AST SERPL-CCNC: 55 U/L (ref 10–40)
BASOPHILS # BLD AUTO: 0.04 K/UL (ref 0–0.2)
BASOPHILS NFR BLD: 0.9 % (ref 0–1.9)
BILIRUB SERPL-MCNC: 0.4 MG/DL (ref 0.1–1)
BUN SERPL-MCNC: 9 MG/DL (ref 6–20)
CALCIUM SERPL-MCNC: 10.6 MG/DL (ref 8.7–10.5)
CHLORIDE SERPL-SCNC: 110 MMOL/L (ref 95–110)
CO2 SERPL-SCNC: 24 MMOL/L (ref 23–29)
CREAT SERPL-MCNC: 0.8 MG/DL (ref 0.5–1.4)
DIFFERENTIAL METHOD: ABNORMAL
EOSINOPHIL # BLD AUTO: 0.3 K/UL (ref 0–0.5)
EOSINOPHIL NFR BLD: 6.1 % (ref 0–8)
ERYTHROCYTE [DISTWIDTH] IN BLOOD BY AUTOMATED COUNT: 14.6 % (ref 11.5–14.5)
EST. GFR  (AFRICAN AMERICAN): >60 ML/MIN/1.73 M^2
EST. GFR  (NON AFRICAN AMERICAN): >60 ML/MIN/1.73 M^2
GLUCOSE SERPL-MCNC: 96 MG/DL (ref 70–110)
HCT VFR BLD AUTO: 43.6 % (ref 37–48.5)
HGB BLD-MCNC: 14.1 G/DL (ref 12–16)
IMM GRANULOCYTES # BLD AUTO: 0.02 K/UL (ref 0–0.04)
IMM GRANULOCYTES NFR BLD AUTO: 0.4 % (ref 0–0.5)
LYMPHOCYTES # BLD AUTO: 1.6 K/UL (ref 1–4.8)
LYMPHOCYTES NFR BLD: 34.1 % (ref 18–48)
MAGNESIUM SERPL-MCNC: 1.9 MG/DL (ref 1.6–2.6)
MCH RBC QN AUTO: 31.2 PG (ref 27–31)
MCHC RBC AUTO-ENTMCNC: 32.3 G/DL (ref 32–36)
MCV RBC AUTO: 97 FL (ref 82–98)
MONOCYTES # BLD AUTO: 0.5 K/UL (ref 0.3–1)
MONOCYTES NFR BLD: 10.3 % (ref 4–15)
NEUTROPHILS # BLD AUTO: 2.2 K/UL (ref 1.8–7.7)
NEUTROPHILS NFR BLD: 48.2 % (ref 38–73)
NRBC BLD-RTO: 0 /100 WBC
PHOSPHATE SERPL-MCNC: 2.9 MG/DL (ref 2.7–4.5)
PLATELET # BLD AUTO: 210 K/UL (ref 150–450)
PMV BLD AUTO: 10.2 FL (ref 9.2–12.9)
POTASSIUM SERPL-SCNC: 3.9 MMOL/L (ref 3.5–5.1)
PROT SERPL-MCNC: 6.8 G/DL (ref 6–8.4)
RBC # BLD AUTO: 4.52 M/UL (ref 4–5.4)
SODIUM SERPL-SCNC: 144 MMOL/L (ref 136–145)
WBC # BLD AUTO: 4.57 K/UL (ref 3.9–12.7)

## 2022-02-21 PROCEDURE — 36415 COLL VENOUS BLD VENIPUNCTURE: CPT | Mod: PN | Performed by: STUDENT IN AN ORGANIZED HEALTH CARE EDUCATION/TRAINING PROGRAM

## 2022-02-21 PROCEDURE — 3008F BODY MASS INDEX DOCD: CPT | Mod: CPTII,S$GLB,, | Performed by: STUDENT IN AN ORGANIZED HEALTH CARE EDUCATION/TRAINING PROGRAM

## 2022-02-21 PROCEDURE — 1159F MED LIST DOCD IN RCRD: CPT | Mod: CPTII,S$GLB,, | Performed by: STUDENT IN AN ORGANIZED HEALTH CARE EDUCATION/TRAINING PROGRAM

## 2022-02-21 PROCEDURE — 99215 PR OFFICE/OUTPT VISIT, EST, LEVL V, 40-54 MIN: ICD-10-PCS | Mod: S$GLB,,, | Performed by: STUDENT IN AN ORGANIZED HEALTH CARE EDUCATION/TRAINING PROGRAM

## 2022-02-21 PROCEDURE — 3079F PR MOST RECENT DIASTOLIC BLOOD PRESSURE 80-89 MM HG: ICD-10-PCS | Mod: CPTII,S$GLB,, | Performed by: STUDENT IN AN ORGANIZED HEALTH CARE EDUCATION/TRAINING PROGRAM

## 2022-02-21 PROCEDURE — 83735 ASSAY OF MAGNESIUM: CPT | Mod: PN | Performed by: STUDENT IN AN ORGANIZED HEALTH CARE EDUCATION/TRAINING PROGRAM

## 2022-02-21 PROCEDURE — 85025 COMPLETE CBC W/AUTO DIFF WBC: CPT | Mod: PN | Performed by: STUDENT IN AN ORGANIZED HEALTH CARE EDUCATION/TRAINING PROGRAM

## 2022-02-21 PROCEDURE — 3079F DIAST BP 80-89 MM HG: CPT | Mod: CPTII,S$GLB,, | Performed by: STUDENT IN AN ORGANIZED HEALTH CARE EDUCATION/TRAINING PROGRAM

## 2022-02-21 PROCEDURE — 3074F SYST BP LT 130 MM HG: CPT | Mod: CPTII,S$GLB,, | Performed by: STUDENT IN AN ORGANIZED HEALTH CARE EDUCATION/TRAINING PROGRAM

## 2022-02-21 PROCEDURE — 99999 PR PBB SHADOW E&M-EST. PATIENT-LVL IV: CPT | Mod: PBBFAC,,, | Performed by: STUDENT IN AN ORGANIZED HEALTH CARE EDUCATION/TRAINING PROGRAM

## 2022-02-21 PROCEDURE — 84100 ASSAY OF PHOSPHORUS: CPT | Mod: PN | Performed by: STUDENT IN AN ORGANIZED HEALTH CARE EDUCATION/TRAINING PROGRAM

## 2022-02-21 PROCEDURE — 1159F PR MEDICATION LIST DOCUMENTED IN MEDICAL RECORD: ICD-10-PCS | Mod: CPTII,S$GLB,, | Performed by: STUDENT IN AN ORGANIZED HEALTH CARE EDUCATION/TRAINING PROGRAM

## 2022-02-21 PROCEDURE — 99215 OFFICE O/P EST HI 40 MIN: CPT | Mod: S$GLB,,, | Performed by: STUDENT IN AN ORGANIZED HEALTH CARE EDUCATION/TRAINING PROGRAM

## 2022-02-21 PROCEDURE — 80053 COMPREHEN METABOLIC PANEL: CPT | Mod: PN | Performed by: STUDENT IN AN ORGANIZED HEALTH CARE EDUCATION/TRAINING PROGRAM

## 2022-02-21 PROCEDURE — 3074F PR MOST RECENT SYSTOLIC BLOOD PRESSURE < 130 MM HG: ICD-10-PCS | Mod: CPTII,S$GLB,, | Performed by: STUDENT IN AN ORGANIZED HEALTH CARE EDUCATION/TRAINING PROGRAM

## 2022-02-21 PROCEDURE — 3008F PR BODY MASS INDEX (BMI) DOCUMENTED: ICD-10-PCS | Mod: CPTII,S$GLB,, | Performed by: STUDENT IN AN ORGANIZED HEALTH CARE EDUCATION/TRAINING PROGRAM

## 2022-02-21 PROCEDURE — 99999 PR PBB SHADOW E&M-EST. PATIENT-LVL IV: ICD-10-PCS | Mod: PBBFAC,,, | Performed by: STUDENT IN AN ORGANIZED HEALTH CARE EDUCATION/TRAINING PROGRAM

## 2022-02-21 RX ORDER — HEPARIN 100 UNIT/ML
500 SYRINGE INTRAVENOUS
Status: CANCELLED | OUTPATIENT
Start: 2022-02-23

## 2022-02-21 RX ORDER — SODIUM CHLORIDE 0.9 % (FLUSH) 0.9 %
10 SYRINGE (ML) INJECTION
Status: CANCELLED | OUTPATIENT
Start: 2022-02-23

## 2022-02-21 RX ORDER — HEPARIN 100 UNIT/ML
500 SYRINGE INTRAVENOUS
Status: CANCELLED | OUTPATIENT
Start: 2022-02-25

## 2022-02-21 RX ORDER — LORAZEPAM 2 MG/ML
1 INJECTION INTRAMUSCULAR
Status: CANCELLED | OUTPATIENT
Start: 2022-02-23 | End: 2022-02-23

## 2022-02-21 RX ORDER — SODIUM CHLORIDE 0.9 % (FLUSH) 0.9 %
10 SYRINGE (ML) INJECTION
Status: CANCELLED | OUTPATIENT
Start: 2022-02-25

## 2022-02-21 RX ORDER — ATROPINE SULFATE 0.4 MG/ML
0.4 INJECTION, SOLUTION ENDOTRACHEAL; INTRAMEDULLARY; INTRAMUSCULAR; INTRAVENOUS; SUBCUTANEOUS ONCE AS NEEDED
Status: CANCELLED | OUTPATIENT
Start: 2022-02-23

## 2022-02-21 NOTE — PROGRESS NOTES
PROGRESS NOTE    Subjective:       Patient ID: Gail Mckinnon is a 51 y.o. female.  MRN: [id number]  : 1970    Chief Complaint: Malignant neoplasm of sigmoid colon    History of Present Illness:   Gail Mckinnon is a 51 y.o. female who presents with  colon cancer, initially stage III and now with LN recurrence.       She completed adjuvant FOLFOX in 2020. She tolerated only 4 cycles of FOLFOX before she developed an infusion reaction to oxaliplatin in cycle 5 was well as cycle 6. She then completed 6 cycles of infusional 5 FU.     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 She is now on second line therapy with FOLFIRI and Avastin.      She presented to the ED on  with left flank pain. CT renal stone study showed Moderate hydronephrosis on the left secondary to 3 mm calculus at the UPJ.     Interim history:  Had COVID positive 2 weeks ago, still recovering, no more fever or chills. Delay in her chemotherapy due to infection. Wondering about possible a break     Oncology History:  Oncology History   Malignant neoplasm of sigmoid colon   3/16/2020 Initial Diagnosis    Malignant neoplasm of sigmoid colon     3/31/2020 Cancer Staged    Staging form: Colon and Rectum, AJCC 8th Edition  - Clinical stage from 3/31/2020: Stage IIIC (cT4b, cN2a, cM0)     2020 - 2020 Chemotherapy    Treatment Summary   Plan Name: OP FOLFOX 6 Q2W  Treatment Goal: Curative  Status: Inactive  Start Date: 2020  End Date: 2020  Provider: Dylan Leyva MD  Chemotherapy: fluorouraciL injection 945 mg, 400 mg/m2 = 945 mg, Intravenous, Clinic/[facility] 1 time, 14 of 14 cycles  Administration: 945 mg (2020), 945 mg (2020), 945 mg (6/3/2020), 945 mg (2020), 945 mg (2020), 945 mg (2020), 945 mg (2020), 945 mg (2020), 945 mg (2020), 945 mg (10/6/2020), 945 mg (10/21/2020), 945 mg  (11/4/2020)  fluorouraciL 2,400 mg/m2 = 5,665 mg in sodium chloride 0.9% 240 mL chemo infusion, 2,400 mg/m2 = 5,665 mg, Intravenous, Over 46 hours, 14 of 14 cycles  Administration: 5,665 mg (5/6/2020), 5,665 mg (5/20/2020), 5,665 mg (6/3/2020), 5,665 mg (6/17/2020), 5,665 mg (7/29/2020), 5,665 mg (8/12/2020), 5,665 mg (8/26/2020), 5,665 mg (9/9/2020), 5,665 mg (9/23/2020), 5,665 mg (10/6/2020), 5,665 mg (10/21/2020), 5,665 mg (11/4/2020)  leucovorin calcium 900 mg in dextrose 5 % 250 mL infusion, 945 mg, Intravenous, Clinic/[facility] 1 time, 14 of 14 cycles  Administration: 900 mg (5/6/2020), 900 mg (5/20/2020), 900 mg (6/3/2020), 900 mg (6/17/2020), 945 mg (6/30/2020), 945 mg (7/20/2020), 945 mg (7/29/2020), 945 mg (8/12/2020), 945 mg (8/26/2020), 945 mg (9/9/2020), 945 mg (9/23/2020), 945 mg (10/6/2020), 945 mg (10/21/2020), 945 mg (11/4/2020)  oxaliplatin (ELOXATIN) 200 mg in dextrose 5 % 500 mL chemo infusion, 201 mg, Intravenous, Clinic/[facility] 1 time, 6 of 6 cycles  Dose modification: 65 mg/m2 (original dose 85 mg/m2, Cycle 6)  Administration: 200 mg (5/6/2020), 200 mg (5/20/2020), 200 mg (6/3/2020), 200 mg (6/17/2020), 201 mg (6/30/2020), 150 mg (7/20/2020)     6/16/2021 -  Chemotherapy    Treatment Summary   Plan Name: OP COLORECTAL FOLFIRI + BEVACIZUMAB Q2W  Treatment Goal: Control  Status: Active  Start Date: 6/16/2021  End Date: 3/25/2022 (Planned)  Provider: Marah Santo MD  Chemotherapy: fluorouraciL injection 990 mg, 400 mg/m2 = 990 mg, Intravenous, Clinic/[facility] 1 time, 15 of 15 cycles  Administration: 990 mg (6/16/2021), 990 mg (6/30/2021), 990 mg (7/14/2021), 980 mg (7/28/2021), 990 mg (8/11/2021), 990 mg (8/25/2021), 990 mg (9/8/2021), 990 mg (9/22/2021), 990 mg (10/6/2021), 990 mg (10/20/2021), 990 mg (11/3/2021), 990 mg (12/1/2021), 990 mg (12/15/2021), 990 mg (11/17/2021), 990 mg (12/28/2021)  fluorouraciL 2,400 mg/m2 = 5,950 mg in sodium chloride 0.9% 240 mL chemo infusion, 2,400 mg/m2 = 5,950 mg,  Intravenous, Over 46 hours, 17 of 20 cycles  Administration: 5,950 mg (6/16/2021), 5,950 mg (6/30/2021), 5,950 mg (7/14/2021), 5,880 mg (7/28/2021), 5,930 mg (8/11/2021), 5,930 mg (8/25/2021), 5,930 mg (9/8/2021), 5,930 mg (9/22/2021), 5,930 mg (10/6/2021), 5,930 mg (10/20/2021), 5,930 mg (11/3/2021), 5,930 mg (12/1/2021), 5,930 mg (12/15/2021), 5,930 mg (11/17/2021), 5,930 mg (12/28/2021), 5,930 mg (2/2/2022), 5,930 mg (1/19/2022)  bevacizumab (AVASTIN) 600 mg in sodium chloride 0.9% 100 mL chemo infusion, 660 mg, Intravenous, Clinic/[facility] 1 time, 17 of 20 cycles  Administration: 600 mg (6/30/2021), 600 mg (7/14/2021), 600 mg (7/28/2021), 600 mg (6/16/2021), 600 mg (8/11/2021), 655 mg (8/25/2021), 600 mg (9/8/2021), 600 mg (9/22/2021), 600 mg (10/6/2021), 600 mg (10/20/2021), 600 mg (11/3/2021), 655 mg (12/1/2021), 600 mg (12/15/2021), 600 mg (11/17/2021), 600 mg (12/28/2021), 600 mg (2/2/2022), 600 mg (1/19/2022)  irinotecan (CAMPTOSAR) 440 mg in sodium chloride 0.9% 500 mL chemo infusion, 446 mg, Intravenous, Clinic/[facility] 1 time, 17 of 20 cycles  Administration: 440 mg (6/16/2021), 440 mg (6/30/2021), 440 mg (7/14/2021), 440 mg (7/28/2021), 440 mg (8/11/2021), 444 mg (8/25/2021), 440 mg (9/8/2021), 440 mg (9/22/2021), 440 mg (10/6/2021), 440 mg (10/20/2021), 440 mg (11/3/2021), 440 mg (12/1/2021), 440 mg (12/15/2021), 440 mg (11/17/2021), 440 mg (12/28/2021), 440 mg (2/2/2022), 440 mg (1/19/2022)  leucovorin calcium 400 mg/m2 = 990 mg in dextrose 5 % 250 mL infusion, 400 mg/m2 = 990 mg, Intravenous, Clinic/[facility] 1 time, 17 of 20 cycles  Administration: 990 mg (6/16/2021), 990 mg (6/30/2021), 990 mg (7/14/2021), 980 mg (7/28/2021), 990 mg (8/11/2021), 990 mg (8/25/2021), 990 mg (9/8/2021), 990 mg (9/22/2021), 990 mg (10/6/2021), 990 mg (10/20/2021), 990 mg (11/3/2021), 990 mg (12/1/2021), 990 mg (12/15/2021), 990 mg (11/17/2021), 990 mg (12/28/2021), 990 mg (2/2/2022), 990 mg (1/19/2022)     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   4/3/2020 Initial Diagnosis    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     5/6/2020 - 11/17/2020 Chemotherapy    Treatment Summary   Plan Name: OP FOLFOX 6 Q2W  Treatment Goal: Curative  Status: Inactive  Start Date: 5/6/2020  End Date: 11/6/2020  Provider: Dylan Leyva MD  Chemotherapy: fluorouraciL injection 945 mg, 400 mg/m2 = 945 mg, Intravenous, Clinic/[facility] 1 time, 14 of 14 cycles  Administration: 945 mg (5/6/2020), 945 mg (5/20/2020), 945 mg (6/3/2020), 945 mg (6/17/2020), 945 mg (7/29/2020), 945 mg (8/12/2020), 945 mg (8/26/2020), 945 mg (9/9/2020), 945 mg (9/23/2020), 945 mg (10/6/2020), 945 mg (10/21/2020), 945 mg (11/4/2020)  fluorouraciL 2,400 mg/m2 = 5,665 mg in sodium chloride 0.9% 240 mL chemo infusion, 2,400 mg/m2 = 5,665 mg, Intravenous, Over 46 hours, 14 of 14 cycles  Administration: 5,665 mg (5/6/2020), 5,665 mg (5/20/2020), 5,665 mg (6/3/2020), 5,665 mg (6/17/2020), 5,665 mg (7/29/2020), 5,665 mg (8/12/2020), 5,665 mg (8/26/2020), 5,665 mg (9/9/2020), 5,665 mg (9/23/2020), 5,665 mg (10/6/2020), 5,665 mg (10/21/2020), 5,665 mg (11/4/2020)  leucovorin calcium 900 mg in dextrose 5 % 250 mL infusion, 945 mg, Intravenous, Clinic/[facility] 1 time, 14 of 14 cycles  Administration: 900 mg (5/6/2020), 900 mg (5/20/2020), 900 mg (6/3/2020), 900 mg (6/17/2020), 945 mg (6/30/2020), 945 mg (7/20/2020), 945 mg (7/29/2020), 945 mg (8/12/2020), 945 mg (8/26/2020), 945 mg (9/9/2020), 945 mg (9/23/2020), 945 mg (10/6/2020), 945 mg (10/21/2020), 945 mg (11/4/2020)  oxaliplatin (ELOXATIN) 200 mg in dextrose 5 % 500 mL chemo infusion, 201 mg, Intravenous, Clinic/[facility] 1 time, 6 of 6 cycles  Dose modification: 65 mg/m2 (original dose 85 mg/m2, Cycle 6)  Administration: 200 mg (5/6/2020), 200 mg (5/20/2020), 200 mg (6/3/2020), 200 mg (6/17/2020), 201 mg (6/30/2020), 150 mg (7/20/2020)     6/16/2021 -  Chemotherapy    Treatment Summary   Plan Name: OP COLORECTAL FOLFIRI + BEVACIZUMAB Q2W  Treatment Goal:  Control  Status: Active  Start Date: 6/16/2021  End Date: 3/25/2022 (Planned)  Provider: Marah Santo MD  Chemotherapy: fluorouraciL injection 990 mg, 400 mg/m2 = 990 mg, Intravenous, [facility], 15 of 15 cycles  Administration: 990 mg (6/16/2021), 990 mg (6/30/2021), 990 mg (7/14/2021), 980 mg (7/28/2021), 990 mg (8/11/2021), 990 mg (8/25/2021), 990 mg (9/8/2021), 990 mg (9/22/2021), 990 mg (10/6/2021), 990 mg (10/20/2021), 990 mg (11/3/2021), 990 mg (12/1/2021), 990 mg (12/15/2021), 990 mg (11/17/2021), 990 mg (12/28/2021)  fluorouraciL 2,400 mg/m2 = 5,950 mg in sodium chloride 0.9% 240 mL chemo infusion, 2,400 mg/m2 = 5,950 mg, Intravenous, Over 46 hours, 17 of 20 cycles  Administration: 5,950 mg (6/16/2021), 5,950 mg (6/30/2021), 5,950 mg (7/14/2021), 5,880 mg (7/28/2021), 5,930 mg (8/11/2021), 5,930 mg (8/25/2021), 5,930 mg (9/8/2021), 5,930 mg (9/22/2021), 5,930 mg (10/6/2021), 5,930 mg (10/20/2021), 5,930 mg (11/3/2021), 5,930 mg (12/1/2021), 5,930 mg (12/15/2021), 5,930 mg (11/17/2021), 5,930 mg (12/28/2021), 5,930 mg (2/2/2022), 5,930 mg (1/19/2022)  bevacizumab (AVASTIN) 600 mg in sodium chloride 0.9% 100 mL chemo infusion, 660 mg, Intravenous, Clinic/[facility] 1 time, 17 of 20 cycles  Administration: 600 mg (6/30/2021), 600 mg (7/14/2021), 600 mg (7/28/2021), 600 mg (6/16/2021), 600 mg (8/11/2021), 655 mg (8/25/2021), 600 mg (9/8/2021), 600 mg (9/22/2021), 600 mg (10/6/2021), 600 mg (10/20/2021), 600 mg (11/3/2021), 655 mg (12/1/2021), 600 mg (12/15/2021), 600 mg (11/17/2021), 600 mg (12/28/2021), 600 mg (2/2/2022), 600 mg (1/19/2022)  irinotecan (CAMPTOSAR) 440 mg in sodium chloride 0.9% 500 mL chemo infusion, 446 mg, Intravenous, Clinic/[facility] 1 time, 17 of 20 cycles  Administration: 440 mg (6/16/2021), 440 mg (6/30/2021), 440 mg (7/14/2021), 440 mg (7/28/2021), 440 mg (8/11/2021), 444 mg (8/25/2021), 440 mg (9/8/2021), 440 mg (9/22/2021), 440 mg (10/6/2021), 440 mg (10/20/2021), 440 mg (11/3/2021),  440 mg (2021), 440 mg (12/15/2021), 440 mg (2021), 440 mg (2021), 440 mg (2022), 440 mg (2022)  leucovorin calcium 400 mg/m2 = 990 mg in dextrose 5 % 250 mL infusion, 400 mg/m2 = 990 mg, Intravenous, Clinic/[facility] 1 time, 17 of 20 cycles  Administration: 990 mg (2021), 990 mg (2021), 990 mg (2021), 980 mg (2021), 990 mg (2021), 990 mg (2021), 990 mg (2021), 990 mg (2021), 990 mg (10/6/2021), 990 mg (10/20/2021), 990 mg (11/3/2021), 990 mg (2021), 990 mg (12/15/2021), 990 mg (2021), 990 mg (2021), 990 mg (2022), 990 mg (2022)         History:  Past Medical History:   Diagnosis Date    Anxiety     Depression     FH: ovarian cancer 3/16/2020    Hx of psychiatric care     Effexor, Paxil, Lexapro, Zoloft, Wellbutrin, Trazodone Buspar    Hyperthyroidism     Hypothyroid     Kidney calculi     Malignant neoplasm of sigmoid colon 3/16/2020    Menorrhagia     Multinodular goiter 2012    Palpitation     Psychiatric problem     Venous insufficiency       Past Surgical History:   Procedure Laterality Date     SECTION, CLASSIC      x3    COLONOSCOPY N/A 2020    Procedure: COLONOSCOPY;  Surgeon: Shane Parker MD;  Location: [facility];  Service: Endoscopy;  Laterality: N/A;    CYSTOSCOPY W/ URETERAL STENT PLACEMENT Bilateral 3/25/2020    Procedure: CYSTOSCOPY, WITH URETERAL STENT INSERTION;  Surgeon: Claudio Tyson MD;  Location: [address];  Service: Urology;  Laterality: Bilateral;    INSERTION OF TUNNELED CENTRAL VENOUS CATHETER (CVC) WITH SUBCUTANEOUS PORT N/A 2020    Procedure: [id number]-[id number];  Surgeon: Stephen Diagnostic Provider;  Location: [facility] OR [address] [place] [address];  Service: Radiology;  Laterality: N/A;  Room 189/Cindy    LAPAROSCOPIC SIGMOIDECTOMY N/A 3/25/2020    Procedure: COLECTOMY, SIGMOID, LAPAROSCOPIC, flex sig, ERAS high;  Surgeon: Silvio aMn MD;  Location: [facility] OR [facility]  FLR;  Service: Colon and Rectal;  Laterality: N/A;    MOBILIZATION OF SPLENIC FLEXURE  3/25/2020    Procedure: MOBILIZATION, SPLENIC FLEXURE;  Surgeon: Silvio Man MD;  Location: [facility] OR 2ND FLR;  Service: Colon and Rectal;;    tonsillectomy      TOTAL ABDOMINAL HYSTERECTOMY W/ BILATERAL SALPINGOOPHORECTOMY N/A 3/25/2020    Procedure: HYSTERECTOMY, TOTAL, ABDOMINAL, WITH BILATERAL SALPINGO-OOPHORECTOMY;  Surgeon: Keron Brady MD;  Location: [facility] OR 2ND FLR;  Service: Oncology;  Laterality: N/A;     Family History   Problem Relation Age of Onset    Heart disease Father     Diabetes Mother 65    Drug abuse Brother     Drug abuse Brother     Cancer Maternal Aunt         lung cancer    Cancer Maternal Grandmother         stomach cancer- started in ovaries    Ovarian cancer Maternal Grandmother         stomach cancer- started in ovaries    Colon cancer Paternal Uncle     Cancer Paternal Uncle     Ovarian cancer Maternal Aunt     Ovarian cancer Maternal Aunt     Ovarian cancer Cousin         mother had ovarian cancer    Drug abuse Son     Drug abuse Son         clean/sober since 2012    Colon cancer Son     No Known Problems Daughter     Uterine cancer Neg Hx     Breast cancer Neg Hx      Social History     Tobacco Use    Smoking status: Never Smoker    Smokeless tobacco: Never Used   Substance and Sexual Activity    Alcohol use: Yes     Comment: occasionally- twice monthly    Drug use: Never    Sexual activity: Yes     Partners: Male     Birth control/protection: See Surgical Hx        ROS:   Review of Systems   Constitutional: Positive for malaise/fatigue. Negative for fever and weight loss.   HENT: Negative for congestion, hearing loss, nosebleeds and sore throat.    Eyes: Negative for double vision and photophobia.   Respiratory: Negative for cough, hemoptysis, sputum production, shortness of breath and wheezing.    Cardiovascular: Negative for chest pain, palpitations, orthopnea  "and leg swelling.   Gastrointestinal: Positive for constipation and nausea. Negative for abdominal pain, blood in stool, diarrhea, heartburn and vomiting.   Genitourinary: Negative for dysuria, frequency, hematuria and urgency.   Musculoskeletal: Negative for back pain, joint pain and myalgias.   Skin: Negative for itching and rash.   Neurological: Negative for dizziness, tingling, seizures, weakness and headaches.   Endo/Heme/Allergies: Negative for polydipsia. Does not bruise/bleed easily.   Psychiatric/Behavioral: Negative for depression and memory loss. The patient is nervous/anxious. The patient does not have insomnia.         Objective:     Vitals:    02/21/22 1419   BP: 118/88   Pulse: 80   Resp: 18   Temp: 97.6 °F (36.4 °C)   TempSrc: Oral   SpO2: 98%   Weight: 135.5 kg (298 lb 11.6 oz)   Height: 5' 6" (1.676 m)   PainSc: 0-No pain     Wt Readings from Last 10 Encounters:   02/21/22 135.5 kg (298 lb 11.6 oz)   02/02/22 132.2 kg (291 lb 7.2 oz)   02/02/22 133.2 kg (293 lb 10.4 oz)   01/18/22 133.7 kg (294 lb 10.7 oz)   01/10/22 133.4 kg (294 lb 1.5 oz)   12/28/21 135.4 kg (298 lb 8.1 oz)   12/23/21 133.4 kg (294 lb 1.5 oz)   12/15/21 135.2 kg (298 lb 1 oz)   12/13/21 135.2 kg (298 lb 1 oz)   12/01/21 135.9 kg (299 lb 9.7 oz)       Physical Examination:   Physical Exam  Vitals and nursing note reviewed.   Constitutional:       General: She is not in acute distress.     Appearance: She is not diaphoretic.   HENT:      Head: Normocephalic.      Mouth/Throat:      Pharynx: No oropharyngeal exudate.   Eyes:      General: No scleral icterus.     Conjunctiva/sclera: Conjunctivae normal.   Neck:      Thyroid: No thyromegaly.   Cardiovascular:      Rate and Rhythm: Normal rate and regular rhythm.      Heart sounds: Normal heart sounds. No murmur heard.  Pulmonary:      Effort: Pulmonary effort is normal. No respiratory distress.      Breath sounds: No stridor. No wheezing or rales.   Chest:      Chest wall: No " tenderness.   Abdominal:      General: Bowel sounds are normal. There is no distension.      Palpations: Abdomen is soft. There is no mass.      Tenderness: There is no abdominal tenderness. There is no rebound.   Musculoskeletal:         General: No tenderness or deformity. Normal range of motion.      Cervical back: Neck supple.   Lymphadenopathy:      Cervical: No cervical adenopathy.   Skin:     General: Skin is warm and dry.      Findings: No erythema or rash.   Neurological:      Mental Status: She is alert and oriented to person, place, and time.      Cranial Nerves: No cranial nerve deficit.      Coordination: Coordination normal.      Gait: Gait is intact.   Psychiatric:         Mood and Affect: Affect normal.         Cognition and Memory: Memory normal.         Judgment: Judgment normal.          Diagnostic Tests:  Significant Imaging: I have reviewed and interpreted all pertinent imaging results/findings.    PET:  Impression:     No FDG PET/CT findings to suggest recurrent or metastatic disease.  The patient's left thyroid uptake is stable from prior exam.        Electronically signed by: Rupali Mayen MD  Date:                                            01/06/2022  Time:                                           11:10    Laboratory Data:  All pertinent labs have been reviewed.  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 Oxaliplatin was stopped due to severe adverse reaction.     Follow-up CTs and PET in May 2021 showed enlarging retroperitoneal node, concerning for metastatic disease.      She is tolerating FOLFIRI + Avastin well and has completed 17 cycles so far. Most recent PET scan showed complete resolution of previously noted hypermetabolic nodes and no other sites of disease.     Plan to discuss her case at our tumor board to see if she is a surgical candidate. If not, we will consider maintenance 5 FU and Avastin.     Given B Adán mutation, she would be a candidate for Cetuximab and BRAF inhibitor at the time of disease progression.    Chemotherapy-induced neutropenia on  1/10/22   - Delay chemo by 1 week. Drop 5 FU bolus with subsequent treatments.   - Improved/resolved, cont with 5FU bolus off     Renal stones  Continue Urology follow up.     Hypercalcemia  she has mild primary hyperparathyroidism.  Continue to monitor calcium, continue adequate hydration.,instructed her to follow up with endocrinology     Transaminitis Fatty liver   Mild and stable. Continue to monitor.     Other constipation   Continue stool softeners and intermittent lactulose.     Nausea  Uncontrolled with promethazine and zofran. Start Sancuso patch.     Dysthymia  Continue Wellbutrin and Buspar and  follow up with Dr. Palm.      ECOG SCORE           Discussion:     RTC in 2 weeks for chemo with MD visit and Lab prior     Plan was discussed with the patient at length, and she verbalized understanding. Gail was given an opportunity to ask questions that were answered to her satisfaction, and she was advised to call in the interval if any problems or questions arise.    Electronically signed by Lin Barcenas MD      Answers for HPI/ROS submitted by the patient on 2/21/2022  appetite change : No  unexpected weight change: No  mouth sores: Yes  visual disturbance:  No  adenopathy: No

## 2022-02-22 NOTE — PROGRESS NOTES
Late entry. SW met with pt on 2/21/22 for a wig fitting. Pt getting ready for her son's wedding. SW assisted in helping pt pick a wig. She has put off picking a wig because it was uncomfortable for the pt.  SW also able to provide pt with donated fake eyelashes.     SW and pt discussed body image. SW provied listed to pt validated feelings and provided support.   SW to follow.     Radha Giordano, JENNIEW      
no

## 2022-02-23 ENCOUNTER — INFUSION (OUTPATIENT)
Dept: INFUSION THERAPY | Facility: HOSPITAL | Age: 52
End: 2022-02-23
Attending: INTERNAL MEDICINE
Payer: COMMERCIAL

## 2022-02-23 ENCOUNTER — DOCUMENTATION ONLY (OUTPATIENT)
Dept: INFUSION THERAPY | Facility: HOSPITAL | Age: 52
End: 2022-02-23
Payer: COMMERCIAL

## 2022-02-23 VITALS
HEART RATE: 82 BPM | DIASTOLIC BLOOD PRESSURE: 81 MMHG | BODY MASS INDEX: 47.09 KG/M2 | SYSTOLIC BLOOD PRESSURE: 142 MMHG | TEMPERATURE: 98 F | RESPIRATION RATE: 14 BRPM | OXYGEN SATURATION: 97 % | WEIGHT: 293 LBS | HEIGHT: 66 IN

## 2022-02-23 DIAGNOSIS — C18.7 MALIGNANT NEOPLASM OF SIGMOID COLON: ICD-10-CM

## 2022-02-23 DIAGNOSIS — C77.2 METASTASIS TO INTESTINAL LYMPH NODE: Primary | ICD-10-CM

## 2022-02-23 PROCEDURE — 25000003 PHARM REV CODE 250: Mod: PN | Performed by: STUDENT IN AN ORGANIZED HEALTH CARE EDUCATION/TRAINING PROGRAM

## 2022-02-23 PROCEDURE — 96416 CHEMO PROLONG INFUSE W/PUMP: CPT | Mod: PN

## 2022-02-23 PROCEDURE — 96413 CHEMO IV INFUSION 1 HR: CPT | Mod: PN

## 2022-02-23 PROCEDURE — 96368 THER/DIAG CONCURRENT INF: CPT | Mod: PN

## 2022-02-23 PROCEDURE — 96375 TX/PRO/DX INJ NEW DRUG ADDON: CPT | Mod: PN

## 2022-02-23 PROCEDURE — 96417 CHEMO IV INFUS EACH ADDL SEQ: CPT | Mod: PN

## 2022-02-23 PROCEDURE — 96367 TX/PROPH/DG ADDL SEQ IV INF: CPT | Mod: PN

## 2022-02-23 PROCEDURE — 96415 CHEMO IV INFUSION ADDL HR: CPT | Mod: PN

## 2022-02-23 PROCEDURE — 63600175 PHARM REV CODE 636 W HCPCS: Mod: JG,PN | Performed by: STUDENT IN AN ORGANIZED HEALTH CARE EDUCATION/TRAINING PROGRAM

## 2022-02-23 RX ORDER — ATROPINE SULFATE 0.4 MG/ML
0.4 INJECTION, SOLUTION ENDOTRACHEAL; INTRAMEDULLARY; INTRAMUSCULAR; INTRAVENOUS; SUBCUTANEOUS ONCE AS NEEDED
Status: DISCONTINUED | OUTPATIENT
Start: 2022-02-23 | End: 2022-02-23 | Stop reason: HOSPADM

## 2022-02-23 RX ORDER — SODIUM CHLORIDE 0.9 % (FLUSH) 0.9 %
10 SYRINGE (ML) INJECTION
Status: DISCONTINUED | OUTPATIENT
Start: 2022-02-23 | End: 2022-02-23 | Stop reason: HOSPADM

## 2022-02-23 RX ORDER — LORAZEPAM 2 MG/ML
1 INJECTION INTRAMUSCULAR
Status: COMPLETED | OUTPATIENT
Start: 2022-02-23 | End: 2022-02-23

## 2022-02-23 RX ADMIN — LORAZEPAM 1 MG: 2 INJECTION INTRAMUSCULAR; INTRAVENOUS at 01:02

## 2022-02-23 RX ADMIN — PALONOSETRON: 0.05 INJECTION, SOLUTION INTRAVENOUS at 02:02

## 2022-02-23 RX ADMIN — PROMETHAZINE HYDROCHLORIDE 6.25 MG: 25 INJECTION INTRAMUSCULAR; INTRAVENOUS at 01:02

## 2022-02-23 RX ADMIN — SODIUM CHLORIDE 440 MG: 0.9 INJECTION, SOLUTION INTRAVENOUS at 02:02

## 2022-02-23 RX ADMIN — SODIUM CHLORIDE: 0.9 INJECTION, SOLUTION INTRAVENOUS at 12:02

## 2022-02-23 RX ADMIN — LEUCOVORIN CALCIUM 1000 MG: 200 INJECTION, POWDER, LYOPHILIZED, FOR SOLUTION INTRAMUSCULAR; INTRAVENOUS at 02:02

## 2022-02-23 RX ADMIN — FLUOROURACIL 6025 MG: 50 INJECTION, SOLUTION INTRAVENOUS at 04:02

## 2022-02-23 RX ADMIN — BEVACIZUMAB 600 MG: 400 INJECTION, SOLUTION INTRAVENOUS at 01:02

## 2022-02-23 NOTE — PROGRESS NOTES
ONCOLOGY NUTRITION   FOLLOW UP VISIT        Gail Mckinnon is a 51 y.o. female.  DATE: 02/23/2022        Oncology Diagnosis: Colon Cancer     REFERRAL FROM:   [] Integrative Oncology   [] Med/Heme Oncology  [] Radiation Oncology  [] Surgical Oncology     [x] Routine Nutrition follow up    TREATMENT PLAN:   [] Full treatment plan pending  [x] Chemotherapy  [] Immunotherapy  [] Radiation  [] Concurrent  [] Surgery  [] Treatment complete/post-treatment    ANTHROPOMETRICS:  Wt Readings from Last 10 Encounters:   02/23/22 135.5 kg (298 lb 11.6 oz)   02/21/22 135.5 kg (298 lb 11.6 oz)   02/02/22 132.2 kg (291 lb 7.2 oz)   02/02/22 133.2 kg (293 lb 10.4 oz)   01/18/22 133.7 kg (294 lb 10.7 oz)   01/10/22 133.4 kg (294 lb 1.5 oz)   12/28/21 135.4 kg (298 lb 8.1 oz)   12/23/21 133.4 kg (294 lb 1.5 oz)   12/15/21 135.2 kg (298 lb 1 oz)   12/13/21 135.2 kg (298 lb 1 oz)      Weight Changes: has been stable    PHYSICAL EXAM:  Muscle Wasting Observed:  [x] No Deficit   [] Mild Deficit   [] Moderate   [] Severe    INTAKE:  [x] PO Intake [] TF Intake  Current Diet: regular diet  Dietary Patterns:  Eating meals/snacks as tolerated   [] Oral nutritional supplements: None reported    SYMPTOMS/COMPLAINTS:  [x] No nutritional concerns at current  [] Diarrhea                    [] Constipation           [] Nausea                 [] Vomiting                [] Indigestion                [] Reflux              [] Poor Appetite            [] Anorexia                 [] Early Satiety         [] Gas                       [] Bloating                     [] Dry Mouth    [] Mucositis                   [] Mouth Sores           [] Poor Dentition      [] Difficulty chewing  [] Difficulty Swallowing   [] Pain with swallowing [] Change in taste      [] Change in smell   [] Pain (general)       [] Fatigue                      [] Sleep issues    [] Weight loss  [] other, please specify    Nutrition Re-Assessment Risk: Low    [x] Labs reviewed    [x] Meds reviewed    Education Provided:   [x] No Education Needed at this time  [] Diarrhea                                              [] Constipation                          [] Nausea/Vomiting  [] Mucositis                [] Dry Mouth    [] Dealing with changes in Taste/Smell  [] Dealing with Poor Appetite   [] Soft/moist Diet      [] Weight Loss/Gain     [] Weight Maintenance                           [] Indigestion/GERD                 [] Gas/Bloating          [] Foods High/ Low in specific nutrients [] Increasing Calories/Protein   [] Milkshake/Smoothies Recipes   [] Nutrition Supplements                        [] Increasing Fluid Intakes         [] Foods that fight cancer    [] Evidence bases resources                 [] Fermented Foods/Probiotics  [] Mediterranean/Plant Based Diet     [] Other, specify                                   [] Handouts provided      [] Samples provided     RD NOTE:  RD met with patient at chairside during infusion treatment. Pt reports continues to do well nutritionally- eating without difficulty and maintaining weight. Pt previously experiencing N/V that was being treated with Enterade. Pt states she is not doing that as often and the nausea is really only one day.   Pt excited to share with RD that her son is getting  in two weeks.    RD Goals:   [x] Weight stable                  [] Weight gain                      [] Weight Loss                               [x] Continue adequate Kcal/protein   [] Increase Kcal/protein      [] Adjust Tube-feeding Rx   [] Tolerate Tube Feedings             [] Increase tube feedings to goal     [] Tolerate Supplements     [] Symptom Improvement   [] Understand nutrition Education  [] Offer supportive visits   [] other, please specify    RECOMMENDATIONS:  1. Continue current fluid intake to maintain proper hydration  2. Continue current calorie/protein intake to maintain weight.     Follow up: Next infusion or PRN throughout  tx    Raquel Bullock, MS, RD, LDN  02/23/2022  1:52 PM

## 2022-02-23 NOTE — PLAN OF CARE
Pt tolerated Avastin Folfiri well. No adverse reaction noted. PAC remains accessed with 5FU infusing at 5.2cc/hr over 46 hours. Patent upon discharge.     Problem: Fatigue  Goal: Improved Activity Tolerance  Outcome: Ongoing, Progressing  Intervention: Promote Energy Conservation  Flowsheets (Taken 2/23/2022 1705)  Fatigue Management: frequent rest breaks encouraged  Sleep/Rest Enhancement: regular sleep/rest pattern promoted  Activity Management:   Ambulated in reyes - L4   Ambulated to bathroom - L4   Up in chair - L1     Problem: Adult Inpatient Plan of Care  Goal: Patient-Specific Goal (Individualization)  Outcome: Ongoing, Progressing  Flowsheets (Taken 2/23/2022 2565)  Individualized Care Needs: recliner, blanket, snack  Anxieties, Fears or Concerns: none  Patient-Specific Goals (Include Timeframe): no s/s of rx during tx  Goal: Absence of Hospital-Acquired Illness or Injury  Outcome: Ongoing, Progressing

## 2022-02-24 ENCOUNTER — DOCUMENTATION ONLY (OUTPATIENT)
Dept: INFUSION THERAPY | Facility: HOSPITAL | Age: 52
End: 2022-02-24
Payer: COMMERCIAL

## 2022-02-24 NOTE — PROGRESS NOTES
Late entry SW saw pt 2/24/22.     SW followed up pt after her wig fitting. Pt did wear her wig to treatment today. Pt said it does feel uncomfortable and she is worried about it moving. SW discussed feelings and validated it will take time for her to feel comfortable. She has received several positive comments about her wig.   SW provided support.   SW to follow.     Radha Giordano, MEDINA

## 2022-02-25 ENCOUNTER — INFUSION (OUTPATIENT)
Dept: INFUSION THERAPY | Facility: HOSPITAL | Age: 52
End: 2022-02-25
Attending: INTERNAL MEDICINE
Payer: COMMERCIAL

## 2022-02-25 VITALS
DIASTOLIC BLOOD PRESSURE: 83 MMHG | RESPIRATION RATE: 18 BRPM | HEART RATE: 75 BPM | SYSTOLIC BLOOD PRESSURE: 136 MMHG | TEMPERATURE: 98 F

## 2022-02-25 DIAGNOSIS — C18.7 MALIGNANT NEOPLASM OF SIGMOID COLON: ICD-10-CM

## 2022-02-25 DIAGNOSIS — C77.2 METASTASIS TO INTESTINAL LYMPH NODE: Primary | ICD-10-CM

## 2022-02-25 PROCEDURE — 63600175 PHARM REV CODE 636 W HCPCS: Mod: PN | Performed by: STUDENT IN AN ORGANIZED HEALTH CARE EDUCATION/TRAINING PROGRAM

## 2022-02-25 PROCEDURE — 25000003 PHARM REV CODE 250: Mod: PN | Performed by: STUDENT IN AN ORGANIZED HEALTH CARE EDUCATION/TRAINING PROGRAM

## 2022-02-25 PROCEDURE — A4216 STERILE WATER/SALINE, 10 ML: HCPCS | Mod: PN | Performed by: STUDENT IN AN ORGANIZED HEALTH CARE EDUCATION/TRAINING PROGRAM

## 2022-02-25 RX ORDER — SODIUM CHLORIDE 0.9 % (FLUSH) 0.9 %
10 SYRINGE (ML) INJECTION
Status: DISCONTINUED | OUTPATIENT
Start: 2022-02-25 | End: 2022-02-25 | Stop reason: HOSPADM

## 2022-02-25 RX ORDER — HEPARIN 100 UNIT/ML
500 SYRINGE INTRAVENOUS
Status: DISCONTINUED | OUTPATIENT
Start: 2022-02-25 | End: 2022-02-25 | Stop reason: HOSPADM

## 2022-02-25 RX ADMIN — Medication 500 UNITS: at 03:02

## 2022-02-25 RX ADMIN — Medication 10 ML: at 03:02

## 2022-02-25 NOTE — PLAN OF CARE
Problem: Adult Inpatient Plan of Care  Goal: Plan of Care Review  Outcome: Ongoing, Progressing  Flowsheets (Taken 2/25/2022 8245)  Plan of Care Reviewed With: patient  Goal: Patient-Specific Goal (Individualized)  Outcome: Ongoing, Progressing     Problem: Fatigue  Goal: Improved Activity Tolerance  Outcome: Ongoing, Progressing   Pt tolerated d/c pump and port flush well.   No adverse reaction noted.  PAC flushed with Heparin and de-accessed per protocol.   Pt left clinic in no acute distress.

## 2022-02-28 ENCOUNTER — TELEPHONE (OUTPATIENT)
Dept: HEMATOLOGY/ONCOLOGY | Facility: CLINIC | Age: 52
End: 2022-02-28
Payer: COMMERCIAL

## 2022-02-28 NOTE — TELEPHONE ENCOUNTER
Spoke with Gail and esvin we are now offering in-person support classes and have a sign up list. Informed herif they are interested we can get their name down for their class of choice. I did let her know we have a limit of 10 people per class, must be fully vaccinated (with proof) and a waiver must be signed. If she wanted to join they should call [phone] or send a message through the portal to participate.   She said she will get back with me next week when she is here to see about signing up!

## 2022-03-07 ENCOUNTER — OFFICE VISIT (OUTPATIENT)
Dept: HEMATOLOGY/ONCOLOGY | Facility: CLINIC | Age: 52
End: 2022-03-07
Payer: COMMERCIAL

## 2022-03-07 ENCOUNTER — LAB VISIT (OUTPATIENT)
Dept: LAB | Facility: HOSPITAL | Age: 52
End: 2022-03-07
Attending: INTERNAL MEDICINE
Payer: COMMERCIAL

## 2022-03-07 VITALS
SYSTOLIC BLOOD PRESSURE: 112 MMHG | HEIGHT: 66 IN | OXYGEN SATURATION: 97 % | WEIGHT: 293 LBS | DIASTOLIC BLOOD PRESSURE: 78 MMHG | BODY MASS INDEX: 47.09 KG/M2 | TEMPERATURE: 98 F | RESPIRATION RATE: 18 BRPM | HEART RATE: 78 BPM

## 2022-03-07 DIAGNOSIS — R11.0 NAUSEA: ICD-10-CM

## 2022-03-07 DIAGNOSIS — C18.7 MALIGNANT NEOPLASM OF SIGMOID COLON: Primary | ICD-10-CM

## 2022-03-07 DIAGNOSIS — C77.9 SECONDARY ADENOCARCINOMA OF LYMPH NODE: ICD-10-CM

## 2022-03-07 DIAGNOSIS — D70.1 CHEMOTHERAPY-INDUCED NEUTROPENIA: ICD-10-CM

## 2022-03-07 DIAGNOSIS — C18.7 MALIGNANT NEOPLASM OF SIGMOID COLON: ICD-10-CM

## 2022-03-07 DIAGNOSIS — T45.1X5A CHEMOTHERAPY-INDUCED NEUTROPENIA: ICD-10-CM

## 2022-03-07 DIAGNOSIS — E83.39 HYPOPHOSPHATEMIA: ICD-10-CM

## 2022-03-07 LAB
ALBUMIN SERPL BCP-MCNC: 3.5 G/DL (ref 3.5–5.2)
ALP SERPL-CCNC: 135 U/L (ref 55–135)
ALT SERPL W/O P-5'-P-CCNC: 71 U/L (ref 10–44)
ANION GAP SERPL CALC-SCNC: 9 MMOL/L (ref 8–16)
AST SERPL-CCNC: 46 U/L (ref 10–40)
BILIRUB SERPL-MCNC: 0.4 MG/DL (ref 0.1–1)
BUN SERPL-MCNC: 10 MG/DL (ref 6–20)
CALCIUM SERPL-MCNC: 10.1 MG/DL (ref 8.7–10.5)
CHLORIDE SERPL-SCNC: 111 MMOL/L (ref 95–110)
CO2 SERPL-SCNC: 22 MMOL/L (ref 23–29)
CREAT SERPL-MCNC: 0.8 MG/DL (ref 0.5–1.4)
ERYTHROCYTE [DISTWIDTH] IN BLOOD BY AUTOMATED COUNT: 15 % (ref 11.5–14.5)
EST. GFR  (AFRICAN AMERICAN): >60 ML/MIN/1.73 M^2
EST. GFR  (NON AFRICAN AMERICAN): >60 ML/MIN/1.73 M^2
GLUCOSE SERPL-MCNC: 122 MG/DL (ref 70–110)
HCT VFR BLD AUTO: 40.5 % (ref 37–48.5)
HGB BLD-MCNC: 13.1 G/DL (ref 12–16)
IMM GRANULOCYTES # BLD AUTO: 0 K/UL (ref 0–0.04)
MAGNESIUM SERPL-MCNC: 2 MG/DL (ref 1.6–2.6)
MCH RBC QN AUTO: 31.3 PG (ref 27–31)
MCHC RBC AUTO-ENTMCNC: 32.3 G/DL (ref 32–36)
MCV RBC AUTO: 97 FL (ref 82–98)
NEUTROPHILS # BLD AUTO: 1.7 K/UL (ref 1.8–7.7)
PHOSPHATE SERPL-MCNC: 1.9 MG/DL (ref 2.7–4.5)
PLATELET # BLD AUTO: 201 K/UL (ref 150–450)
PMV BLD AUTO: 10.2 FL (ref 9.2–12.9)
POTASSIUM SERPL-SCNC: 3.9 MMOL/L (ref 3.5–5.1)
PROT SERPL-MCNC: 6.3 G/DL (ref 6–8.4)
RBC # BLD AUTO: 4.18 M/UL (ref 4–5.4)
SODIUM SERPL-SCNC: 142 MMOL/L (ref 136–145)
WBC # BLD AUTO: 3.74 K/UL (ref 3.9–12.7)

## 2022-03-07 PROCEDURE — 85027 COMPLETE CBC AUTOMATED: CPT | Mod: PN | Performed by: STUDENT IN AN ORGANIZED HEALTH CARE EDUCATION/TRAINING PROGRAM

## 2022-03-07 PROCEDURE — 99215 OFFICE O/P EST HI 40 MIN: CPT | Mod: S$GLB,,, | Performed by: INTERNAL MEDICINE

## 2022-03-07 PROCEDURE — 99215 PR OFFICE/OUTPT VISIT, EST, LEVL V, 40-54 MIN: ICD-10-PCS | Mod: S$GLB,,, | Performed by: INTERNAL MEDICINE

## 2022-03-07 PROCEDURE — 84100 ASSAY OF PHOSPHORUS: CPT | Mod: PN | Performed by: STUDENT IN AN ORGANIZED HEALTH CARE EDUCATION/TRAINING PROGRAM

## 2022-03-07 PROCEDURE — 3008F BODY MASS INDEX DOCD: CPT | Mod: CPTII,S$GLB,, | Performed by: INTERNAL MEDICINE

## 2022-03-07 PROCEDURE — 99999 PR PBB SHADOW E&M-EST. PATIENT-LVL IV: ICD-10-PCS | Mod: PBBFAC,,, | Performed by: INTERNAL MEDICINE

## 2022-03-07 PROCEDURE — 99999 PR PBB SHADOW E&M-EST. PATIENT-LVL IV: CPT | Mod: PBBFAC,,, | Performed by: INTERNAL MEDICINE

## 2022-03-07 PROCEDURE — 3078F PR MOST RECENT DIASTOLIC BLOOD PRESSURE < 80 MM HG: ICD-10-PCS | Mod: CPTII,S$GLB,, | Performed by: INTERNAL MEDICINE

## 2022-03-07 PROCEDURE — 3074F SYST BP LT 130 MM HG: CPT | Mod: CPTII,S$GLB,, | Performed by: INTERNAL MEDICINE

## 2022-03-07 PROCEDURE — 83735 ASSAY OF MAGNESIUM: CPT | Mod: PN | Performed by: STUDENT IN AN ORGANIZED HEALTH CARE EDUCATION/TRAINING PROGRAM

## 2022-03-07 PROCEDURE — 80053 COMPREHEN METABOLIC PANEL: CPT | Mod: PN | Performed by: STUDENT IN AN ORGANIZED HEALTH CARE EDUCATION/TRAINING PROGRAM

## 2022-03-07 PROCEDURE — 36415 COLL VENOUS BLD VENIPUNCTURE: CPT | Mod: PN | Performed by: STUDENT IN AN ORGANIZED HEALTH CARE EDUCATION/TRAINING PROGRAM

## 2022-03-07 PROCEDURE — 3078F DIAST BP <80 MM HG: CPT | Mod: CPTII,S$GLB,, | Performed by: INTERNAL MEDICINE

## 2022-03-07 PROCEDURE — 3008F PR BODY MASS INDEX (BMI) DOCUMENTED: ICD-10-PCS | Mod: CPTII,S$GLB,, | Performed by: INTERNAL MEDICINE

## 2022-03-07 PROCEDURE — 3074F PR MOST RECENT SYSTOLIC BLOOD PRESSURE < 130 MM HG: ICD-10-PCS | Mod: CPTII,S$GLB,, | Performed by: INTERNAL MEDICINE

## 2022-03-07 RX ORDER — SODIUM CHLORIDE 0.9 % (FLUSH) 0.9 %
10 SYRINGE (ML) INJECTION
Status: CANCELLED | OUTPATIENT
Start: 2022-03-09

## 2022-03-07 RX ORDER — HEPARIN 100 UNIT/ML
500 SYRINGE INTRAVENOUS
Status: CANCELLED | OUTPATIENT
Start: 2022-03-11

## 2022-03-07 RX ORDER — HEPARIN 100 UNIT/ML
500 SYRINGE INTRAVENOUS
Status: CANCELLED | OUTPATIENT
Start: 2022-03-09

## 2022-03-07 RX ORDER — ATROPINE SULFATE 0.4 MG/ML
0.4 INJECTION, SOLUTION ENDOTRACHEAL; INTRAMEDULLARY; INTRAMUSCULAR; INTRAVENOUS; SUBCUTANEOUS ONCE AS NEEDED
Status: CANCELLED | OUTPATIENT
Start: 2022-03-09

## 2022-03-07 RX ORDER — LORAZEPAM 2 MG/ML
1 INJECTION INTRAMUSCULAR
Status: CANCELLED | OUTPATIENT
Start: 2022-03-09 | End: 2022-03-09

## 2022-03-07 RX ORDER — SODIUM CHLORIDE 0.9 % (FLUSH) 0.9 %
10 SYRINGE (ML) INJECTION
Status: CANCELLED | OUTPATIENT
Start: 2022-03-11

## 2022-03-07 NOTE — PROGRESS NOTES
PROGRESS NOTE    Subjective:       Patient ID: Gail Mckinnon is a 51 y.o. female.  MRN: [id number]  : 1970    Chief Complaint: Malignant neoplasm of sigmoid colon       History of Present Illness:   Gail Mckinnon is a 51 y.o. female who presents with   colon cancer, initially stage III and now with LN recurrence.       She completed adjuvant FOLFOX in 2020. She tolerated only 4 cycles of FOLFOX before she developed an infusion reaction to oxaliplatin in cycle 5 was well as cycle 6. She then completed 6 cycles of infusional 5 FU.     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 She is now on second line therapy with FOLFIRI and Avastin.         She presented to the ED on  with left flank pain. CT renal stone study showed Moderate hydronephrosis on the left secondary to 3 mm calculus at the UPJ.     Interim history:  She presents to discuss symptoms, labs,and further recommendations.   Her son just got  and she reports fatigue due to being busy. She did not use the Sancuso patch with her last chemo and noticed more nausea.     Oncology History:  Oncology History   Malignant neoplasm of sigmoid colon   3/16/2020 Initial Diagnosis    Malignant neoplasm of sigmoid colon     3/31/2020 Cancer Staged    Staging form: Colon and Rectum, AJCC 8th Edition  - Clinical stage from 3/31/2020: Stage IIIC (cT4b, cN2a, cM0)     2020 - 2020 Chemotherapy    Treatment Summary   Plan Name: OP FOLFOX 6 Q2W  Treatment Goal: Curative  Status: Inactive  Start Date: 2020  End Date: 2020  Provider: Dylan Leyva MD  Chemotherapy: fluorouraciL injection 945 mg, 400 mg/m2 = 945 mg, Intravenous, Clinic/[facility] 1 time, 14 of 14 cycles  Administration: 945 mg (2020), 945 mg (2020), 945 mg (6/3/2020), 945 mg (2020), 945 mg (2020), 945 mg (2020), 945 mg (2020), 945 mg (2020), 945  mg (9/23/2020), 945 mg (10/6/2020), 945 mg (10/21/2020), 945 mg (11/4/2020)  fluorouraciL 2,400 mg/m2 = 5,665 mg in sodium chloride 0.9% 240 mL chemo infusion, 2,400 mg/m2 = 5,665 mg, Intravenous, Over 46 hours, 14 of 14 cycles  Administration: 5,665 mg (5/6/2020), 5,665 mg (5/20/2020), 5,665 mg (6/3/2020), 5,665 mg (6/17/2020), 5,665 mg (7/29/2020), 5,665 mg (8/12/2020), 5,665 mg (8/26/2020), 5,665 mg (9/9/2020), 5,665 mg (9/23/2020), 5,665 mg (10/6/2020), 5,665 mg (10/21/2020), 5,665 mg (11/4/2020)  leucovorin calcium 900 mg in dextrose 5 % 250 mL infusion, 945 mg, Intravenous, Clinic/[facility] 1 time, 14 of 14 cycles  Administration: 900 mg (5/6/2020), 900 mg (5/20/2020), 900 mg (6/3/2020), 900 mg (6/17/2020), 945 mg (6/30/2020), 945 mg (7/20/2020), 945 mg (7/29/2020), 945 mg (8/12/2020), 945 mg (8/26/2020), 945 mg (9/9/2020), 945 mg (9/23/2020), 945 mg (10/6/2020), 945 mg (10/21/2020), 945 mg (11/4/2020)  oxaliplatin (ELOXATIN) 200 mg in dextrose 5 % 500 mL chemo infusion, 201 mg, Intravenous, Clinic/[facility] 1 time, 6 of 6 cycles  Dose modification: 65 mg/m2 (original dose 85 mg/m2, Cycle 6)  Administration: 200 mg (5/6/2020), 200 mg (5/20/2020), 200 mg (6/3/2020), 200 mg (6/17/2020), 201 mg (6/30/2020), 150 mg (7/20/2020)     6/16/2021 -  Chemotherapy    Treatment Summary   Plan Name: OP COLORECTAL FOLFIRI + BEVACIZUMAB Q2W  Treatment Goal: Control  Status: Active  Start Date: 6/16/2021  End Date: 3/25/2022 (Planned)  Provider: Marah Santo MD  Chemotherapy: fluorouraciL injection 990 mg, 400 mg/m2 = 990 mg, Intravenous, Clinic/[facility] 1 time, 15 of 15 cycles  Administration: 990 mg (6/16/2021), 990 mg (6/30/2021), 990 mg (7/14/2021), 980 mg (7/28/2021), 990 mg (8/11/2021), 990 mg (8/25/2021), 990 mg (9/8/2021), 990 mg (9/22/2021), 990 mg (10/6/2021), 990 mg (10/20/2021), 990 mg (11/3/2021), 990 mg (12/1/2021), 990 mg (12/15/2021), 990 mg (11/17/2021), 990 mg (12/28/2021)  fluorouraciL 2,400 mg/m2 = 5,950 mg in sodium  chloride 0.9% 240 mL chemo infusion, 2,400 mg/m2 = 5,950 mg, Intravenous, Over 46 hours, 19 of 20 cycles  Administration: 5,950 mg (6/16/2021), 5,950 mg (6/30/2021), 5,950 mg (7/14/2021), 5,880 mg (7/28/2021), 5,930 mg (8/11/2021), 5,930 mg (8/25/2021), 5,930 mg (9/8/2021), 5,930 mg (9/22/2021), 5,930 mg (10/6/2021), 5,930 mg (10/20/2021), 5,930 mg (11/3/2021), 5,930 mg (12/1/2021), 5,930 mg (12/15/2021), 5,930 mg (11/17/2021), 5,930 mg (12/28/2021), 6,025 mg (3/9/2022), 6,025 mg (2/23/2022), 5,930 mg (2/2/2022), 5,930 mg (1/19/2022)  bevacizumab (AVASTIN) 600 mg in sodium chloride 0.9% 100 mL chemo infusion, 660 mg, Intravenous, Clinic/[facility] 1 time, 19 of 20 cycles  Administration: 600 mg (6/30/2021), 600 mg (7/14/2021), 600 mg (7/28/2021), 600 mg (6/16/2021), 600 mg (8/11/2021), 655 mg (8/25/2021), 600 mg (9/8/2021), 600 mg (9/22/2021), 600 mg (10/6/2021), 600 mg (10/20/2021), 600 mg (11/3/2021), 655 mg (12/1/2021), 600 mg (12/15/2021), 600 mg (11/17/2021), 600 mg (12/28/2021), 600 mg (3/9/2022), 600 mg (2/23/2022), 600 mg (2/2/2022), 600 mg (1/19/2022)  irinotecan (CAMPTOSAR) 440 mg in sodium chloride 0.9% 500 mL chemo infusion, 446 mg, Intravenous, Clinic/[facility] 1 time, 19 of 20 cycles  Administration: 440 mg (6/16/2021), 440 mg (6/30/2021), 440 mg (7/14/2021), 440 mg (7/28/2021), 440 mg (8/11/2021), 444 mg (8/25/2021), 440 mg (9/8/2021), 440 mg (9/22/2021), 440 mg (10/6/2021), 440 mg (10/20/2021), 440 mg (11/3/2021), 440 mg (12/1/2021), 440 mg (12/15/2021), 440 mg (11/17/2021), 440 mg (12/28/2021), 440 mg (3/9/2022), 440 mg (2/23/2022), 440 mg (2/2/2022), 440 mg (1/19/2022)  leucovorin calcium 400 mg/m2 = 990 mg in dextrose 5 % 250 mL infusion, 400 mg/m2 = 990 mg, Intravenous, Clinic/[facility] 1 time, 19 of 20 cycles  Administration: 990 mg (6/16/2021), 990 mg (6/30/2021), 990 mg (7/14/2021), 980 mg (7/28/2021), 990 mg (8/11/2021), 990 mg (8/25/2021), 990 mg (9/8/2021), 990 mg (9/22/2021), 990 mg (10/6/2021), 990 mg  (10/20/2021), 990 mg (11/3/2021), 990 mg (12/1/2021), 990 mg (12/15/2021), 990 mg (11/17/2021), 990 mg (12/28/2021), 1,000 mg (3/9/2022), 1,000 mg (2/23/2022), 990 mg (2/2/2022), 990 mg (1/19/2022)     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   4/3/2020 Initial Diagnosis    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     5/6/2020 - 11/17/2020 Chemotherapy    Treatment Summary   Plan Name: OP FOLFOX 6 Q2W  Treatment Goal: Curative  Status: Inactive  Start Date: 5/6/2020  End Date: 11/6/2020  Provider: Dylan Leyva MD  Chemotherapy: fluorouraciL injection 945 mg, 400 mg/m2 = 945 mg, Intravenous, Clinic/[facility] 1 time, 14 of 14 cycles  Administration: 945 mg (5/6/2020), 945 mg (5/20/2020), 945 mg (6/3/2020), 945 mg (6/17/2020), 945 mg (7/29/2020), 945 mg (8/12/2020), 945 mg (8/26/2020), 945 mg (9/9/2020), 945 mg (9/23/2020), 945 mg (10/6/2020), 945 mg (10/21/2020), 945 mg (11/4/2020)  fluorouraciL 2,400 mg/m2 = 5,665 mg in sodium chloride 0.9% 240 mL chemo infusion, 2,400 mg/m2 = 5,665 mg, Intravenous, Over 46 hours, 14 of 14 cycles  Administration: 5,665 mg (5/6/2020), 5,665 mg (5/20/2020), 5,665 mg (6/3/2020), 5,665 mg (6/17/2020), 5,665 mg (7/29/2020), 5,665 mg (8/12/2020), 5,665 mg (8/26/2020), 5,665 mg (9/9/2020), 5,665 mg (9/23/2020), 5,665 mg (10/6/2020), 5,665 mg (10/21/2020), 5,665 mg (11/4/2020)  leucovorin calcium 900 mg in dextrose 5 % 250 mL infusion, 945 mg, Intravenous, Clinic/[facility] 1 time, 14 of 14 cycles  Administration: 900 mg (5/6/2020), 900 mg (5/20/2020), 900 mg (6/3/2020), 900 mg (6/17/2020), 945 mg (6/30/2020), 945 mg (7/20/2020), 945 mg (7/29/2020), 945 mg (8/12/2020), 945 mg (8/26/2020), 945 mg (9/9/2020), 945 mg (9/23/2020), 945 mg (10/6/2020), 945 mg (10/21/2020), 945 mg (11/4/2020)  oxaliplatin (ELOXATIN) 200 mg in dextrose 5 % 500 mL chemo infusion, 201 mg, Intravenous, Clinic/[facility] 1 time, 6 of 6 cycles  Dose modification: 65 mg/m2 (original dose 85 mg/m2, Cycle 6)  Administration: 200 mg (5/6/2020), 200  mg (5/20/2020), 200 mg (6/3/2020), 200 mg (6/17/2020), 201 mg (6/30/2020), 150 mg (7/20/2020)     6/16/2021 -  Chemotherapy    Treatment Summary   Plan Name: OP COLORECTAL FOLFIRI + BEVACIZUMAB Q2W  Treatment Goal: Control  Status: Active  Start Date: 6/16/2021  End Date: 3/25/2022 (Planned)  Provider: Marah Santo MD  Chemotherapy: fluorouraciL injection 990 mg, 400 mg/m2 = 990 mg, Intravenous, Clinic/[facility] 1 time, 15 of 15 cycles  Administration: 990 mg (6/16/2021), 990 mg (6/30/2021), 990 mg (7/14/2021), 980 mg (7/28/2021), 990 mg (8/11/2021), 990 mg (8/25/2021), 990 mg (9/8/2021), 990 mg (9/22/2021), 990 mg (10/6/2021), 990 mg (10/20/2021), 990 mg (11/3/2021), 990 mg (12/1/2021), 990 mg (12/15/2021), 990 mg (11/17/2021), 990 mg (12/28/2021)  fluorouraciL 2,400 mg/m2 = 5,950 mg in sodium chloride 0.9% 240 mL chemo infusion, 2,400 mg/m2 = 5,950 mg, Intravenous, Over 46 hours, 19 of 20 cycles  Administration: 5,950 mg (6/16/2021), 5,950 mg (6/30/2021), 5,950 mg (7/14/2021), 5,880 mg (7/28/2021), 5,930 mg (8/11/2021), 5,930 mg (8/25/2021), 5,930 mg (9/8/2021), 5,930 mg (9/22/2021), 5,930 mg (10/6/2021), 5,930 mg (10/20/2021), 5,930 mg (11/3/2021), 5,930 mg (12/1/2021), 5,930 mg (12/15/2021), 5,930 mg (11/17/2021), 5,930 mg (12/28/2021), 6,025 mg (3/9/2022), 6,025 mg (2/23/2022), 5,930 mg (2/2/2022), 5,930 mg (1/19/2022)  bevacizumab (AVASTIN) 600 mg in sodium chloride 0.9% 100 mL chemo infusion, 660 mg, Intravenous, Clinic/[facility] 1 time, 19 of 20 cycles  Administration: 600 mg (6/30/2021), 600 mg (7/14/2021), 600 mg (7/28/2021), 600 mg (6/16/2021), 600 mg (8/11/2021), 655 mg (8/25/2021), 600 mg (9/8/2021), 600 mg (9/22/2021), 600 mg (10/6/2021), 600 mg (10/20/2021), 600 mg (11/3/2021), 655 mg (12/1/2021), 600 mg (12/15/2021), 600 mg (11/17/2021), 600 mg (12/28/2021), 600 mg (3/9/2022), 600 mg (2/23/2022), 600 mg (2/2/2022), 600 mg (1/19/2022)  irinotecan (CAMPTOSAR) 440 mg in sodium chloride 0.9% 500 mL chemo  infusion, 446 mg, Intravenous, Clinic/[facility] 1 time, 19 of 20 cycles  Administration: 440 mg (2021), 440 mg (2021), 440 mg (2021), 440 mg (2021), 440 mg (2021), 444 mg (2021), 440 mg (2021), 440 mg (2021), 440 mg (10/6/2021), 440 mg (10/20/2021), 440 mg (11/3/2021), 440 mg (2021), 440 mg (12/15/2021), 440 mg (2021), 440 mg (2021), 440 mg (3/9/2022), 440 mg (2022), 440 mg (2022), 440 mg (2022)  leucovorin calcium 400 mg/m2 = 990 mg in dextrose 5 % 250 mL infusion, 400 mg/m2 = 990 mg, Intravenous, Clinic/[facility] 1 time, 19 of 20 cycles  Administration: 990 mg (2021), 990 mg (2021), 990 mg (2021), 980 mg (2021), 990 mg (2021), 990 mg (2021), 990 mg (2021), 990 mg (2021), 990 mg (10/6/2021), 990 mg (10/20/2021), 990 mg (11/3/2021), 990 mg (2021), 990 mg (12/15/2021), 990 mg (2021), 990 mg (2021), 1,000 mg (3/9/2022), 1,000 mg (2022), 990 mg (2022), 990 mg (2022)         History:  Past Medical History:   Diagnosis Date    Anxiety     Depression     FH: ovarian cancer 3/16/2020    Hx of psychiatric care     Effexor, Paxil, Lexapro, Zoloft, Wellbutrin, Trazodone Buspar    Hyperthyroidism     Hypothyroid     Kidney calculi     Malignant neoplasm of sigmoid colon 3/16/2020    Menorrhagia     Multinodular goiter 2012    Palpitation     Psychiatric problem     Venous insufficiency       Past Surgical History:   Procedure Laterality Date     SECTION, CLASSIC      x3    COLONOSCOPY N/A 2020    Procedure: COLONOSCOPY;  Surgeon: Shane Parker MD;  Location: [facility];  Service: Endoscopy;  Laterality: N/A;    CYSTOSCOPY W/ URETERAL STENT PLACEMENT Bilateral 3/25/2020    Procedure: CYSTOSCOPY, WITH URETERAL STENT INSERTION;  Surgeon: Claudio Tyson MD;  Location: [address];  Service: Urology;  Laterality: Bilateral;    INSERTION OF TUNNELED CENTRAL VENOUS  CATHETER (CVC) WITH SUBCUTANEOUS PORT N/A 5/1/2020    Procedure: [id number];  Surgeon: Stephen Diagnostic Provider;  Location: [facility] OR 2ND FLR;  Service: Radiology;  Laterality: N/A;  Room 189/Cindy    LAPAROSCOPIC SIGMOIDECTOMY N/A 3/25/2020    Procedure: COLECTOMY, SIGMOID, LAPAROSCOPIC, flex sig, ERAS high;  Surgeon: Silvio Man MD;  Location: [facility] OR 2ND FLR;  Service: Colon and Rectal;  Laterality: N/A;    MOBILIZATION OF SPLENIC FLEXURE  3/25/2020    Procedure: MOBILIZATION, SPLENIC FLEXURE;  Surgeon: Silvio Man MD;  Location: [facility] 2ND F[facility];  Service: Colon and Rectal;;    tonsillectomy      TOTAL ABDOMINAL HYSTERECTOMY W/ BILATERAL SALPINGOOPHORECTOMY N/A 3/25/2020    Procedure: HYSTERECTOMY, TOTAL, ABDOMINAL, WITH BILATERAL SALPINGO-OOPHORECTOMY;  Surgeon: Keron Brady MD;  Location: [facility] OR 2ND FLR;  Service: Oncology;  Laterality: N/A;     Family History   Problem Relation Age of Onset    Heart disease Father     Diabetes Mother 65    Drug abuse Brother     Drug abuse Brother     Cancer Maternal Aunt         lung cancer    Cancer Maternal Grandmother         stomach cancer- started in ovaries    Ovarian cancer Maternal Grandmother         stomach cancer- started in ovaries    Colon cancer Paternal Uncle     Cancer Paternal Uncle     Ovarian cancer Maternal Aunt     Ovarian cancer Maternal Aunt     Ovarian cancer Cousin         mother had ovarian cancer    Drug abuse Son     Drug abuse Son         clean/sober since 2012    Colon cancer Son     No Known Problems Daughter     Uterine cancer Neg Hx     Breast cancer Neg Hx      Social History     Tobacco Use    Smoking status: Never Smoker    Smokeless tobacco: Never Used   Substance and Sexual Activity    Alcohol use: Yes     Comment: occasionally- twice monthly    Drug use: Never    Sexual activity: Yes     Partners: Male     Birth control/protection: See Surgical Hx        ROS:   Review of  "Systems   Constitutional: Negative for fever, malaise/fatigue and weight loss.   HENT: Negative for congestion, hearing loss, nosebleeds and sore throat.    Eyes: Negative for double vision and photophobia.   Respiratory: Negative for cough, hemoptysis, sputum production, shortness of breath and wheezing.    Cardiovascular: Negative for chest pain, palpitations, orthopnea and leg swelling.   Gastrointestinal: Positive for nausea. Negative for abdominal pain, blood in stool, constipation, diarrhea, heartburn and vomiting.   Genitourinary: Negative for dysuria, hematuria and urgency.   Musculoskeletal: Positive for joint pain. Negative for back pain and myalgias.   Skin: Negative for itching and rash.   Neurological: Negative for dizziness, tingling, seizures, weakness and headaches.   Endo/Heme/Allergies: Negative for polydipsia. Does not bruise/bleed easily.   Psychiatric/Behavioral: Negative for depression and memory loss. The patient is not nervous/anxious and does not have insomnia.         Objective:     Vitals:    03/07/22 1428   BP: 112/78   Pulse: 78   Resp: 18   Temp: 97.8 °F (36.6 °C)   TempSrc: Oral   SpO2: 97%   Weight: 135 kg (297 lb 9.9 oz)   Height: 5' 6" (1.676 m)   PainSc: 0-No pain     Wt Readings from Last 10 Encounters:   03/09/22 135 kg (297 lb 9.9 oz)   03/07/22 135 kg (297 lb 9.9 oz)   02/23/22 135.5 kg (298 lb 11.6 oz)   02/21/22 135.5 kg (298 lb 11.6 oz)   02/02/22 132.2 kg (291 lb 7.2 oz)   02/02/22 133.2 kg (293 lb 10.4 oz)   01/18/22 133.7 kg (294 lb 10.7 oz)   01/10/22 133.4 kg (294 lb 1.5 oz)   12/28/21 135.4 kg (298 lb 8.1 oz)   12/23/21 133.4 kg (294 lb 1.5 oz)       Physical Examination:   Physical Exam  Vitals and nursing note reviewed.   Constitutional:       General: She is not in acute distress.     Appearance: She is not diaphoretic.   HENT:      Head: Normocephalic.      Mouth/Throat:      Pharynx: No oropharyngeal exudate.   Eyes:      General: No scleral icterus.     " Conjunctiva/sclera: Conjunctivae normal.   Neck:      Thyroid: No thyromegaly.   Cardiovascular:      Rate and Rhythm: Normal rate and regular rhythm.      Heart sounds: Normal heart sounds. No murmur heard.  Pulmonary:      Effort: Pulmonary effort is normal. No respiratory distress.      Breath sounds: No stridor. No wheezing or rales.   Chest:      Chest wall: No tenderness.   Abdominal:      General: Bowel sounds are normal. There is no distension.      Palpations: Abdomen is soft. There is no mass.      Tenderness: There is no abdominal tenderness. There is no rebound.   Musculoskeletal:         General: No tenderness or deformity. Normal range of motion.      Cervical back: Neck supple.   Lymphadenopathy:      Cervical: No cervical adenopathy.   Skin:     General: Skin is warm and dry.      Findings: No erythema or rash.   Neurological:      Mental Status: She is alert and oriented to person, place, and time.      Cranial Nerves: No cranial nerve deficit.      Coordination: Coordination normal.      Gait: Gait is intact.   Psychiatric:         Mood and Affect: Affect normal.         Cognition and Memory: Memory normal.         Judgment: Judgment normal.          Diagnostic Tests:  Significant Imaging: I have reviewed and interpreted all pertinent imaging results/findings.    Laboratory Data:  All pertinent labs have been reviewed.  Labs:   Lab Results   Component Value Date    WBC 3.74 (L) 03/07/2022    RBC 4.18 03/07/2022    HGB 13.1 03/07/2022    HCT 40.5 03/07/2022    MCV 97 03/07/2022     03/07/2022     (H) 03/07/2022     03/07/2022    K 3.9 03/07/2022    BUN 10 03/07/2022    CREATININE 0.8 03/07/2022    AST 46 (H) 03/07/2022    ALT 71 (H) 03/07/2022    BILITOT 0.4 03/07/2022       Assessment/Plan:   Malignant neoplasm of sigmoid colon  Secondary adenocarcinoma of lymph node  [id number] poorly differentiated adenocarcinoma, MSI stable, B Adán mutation positive     She completed adjuvant  chemotherapy, 4 cycles of FOLFOX and the remainder infusional 5 FU, completed in November 2020. Oxaliplatin was stopped due to severe adverse reaction.     Follow-up CTs and PET in May 2021 showed enlarging retroperitoneal node, concerning for metastatic disease.      She is tolerating FOLFIRI + Avastin well and has completed 15 cycles so far. Most recent PET scan showed complete resolution of previously noted hypermetabolic nodes and no other sites of disease.     Her case has been discussed at the colorectal tumor board ands she is not deemed a surgical candidate. We discussed maintenance 5 FU and Avastin vs full dose chemo as tolerated. Will continue FOLFIRI and Avastin at this time.      I will discuss her case at our tumor board to see if she is a surgical candidate. If not, we will consider maintenance 5 FU and Avastin.         -     CBC Auto Differential; Standing  -     CMP; Future; Expected date: 03/07/2022  -     Urinalysis  -     Phosphorus; Standing  -     Magnesium; Future; Expected date: 03/07/2022      Hypophosphatemia  -     potassium phosphate, monobasic, (K-PHOS) 500 mg TbSO; Take 1 tablet (500 mg total) by mouth once daily.  Dispense: 30 tablet; Refill: 11    Renal stones  Continue Urology follow up.      Hypercalcemia  she has mild primary hyperparathyroidism.  Continue to monitor calcium, continue adequate hydration.    Chemotherapy-induced neutropenia  5Fu bolus has been stopped. ANC has improved.     Nausea  Encouraged use of sancuso patch with her next cycle.        ECOG SCORE    1 - Restricted in strenuous activity-ambulatory and able to carry out work of a light nature           Discussion:   Follow up in about 2 weeks (around 3/21/2022) for MD, Lab Results, Chemo.    Plan was discussed with the patient at length, and she verbalized understanding. Gail was given an opportunity to ask questions that were answered to her satisfaction, and she was advised to call in the interval if any  problems or questions arise.    Electronically signed by Marah Santo MD

## 2022-03-09 ENCOUNTER — DOCUMENTATION ONLY (OUTPATIENT)
Dept: INFUSION THERAPY | Facility: HOSPITAL | Age: 52
End: 2022-03-09
Payer: COMMERCIAL

## 2022-03-09 ENCOUNTER — INFUSION (OUTPATIENT)
Dept: INFUSION THERAPY | Facility: HOSPITAL | Age: 52
End: 2022-03-09
Attending: INTERNAL MEDICINE
Payer: COMMERCIAL

## 2022-03-09 VITALS
HEART RATE: 82 BPM | SYSTOLIC BLOOD PRESSURE: 160 MMHG | DIASTOLIC BLOOD PRESSURE: 80 MMHG | HEIGHT: 66 IN | OXYGEN SATURATION: 98 % | RESPIRATION RATE: 18 BRPM | WEIGHT: 293 LBS | TEMPERATURE: 98 F | BODY MASS INDEX: 47.09 KG/M2

## 2022-03-09 DIAGNOSIS — C77.2 METASTASIS TO INTESTINAL LYMPH NODE: Primary | ICD-10-CM

## 2022-03-09 DIAGNOSIS — C18.7 MALIGNANT NEOPLASM OF SIGMOID COLON: ICD-10-CM

## 2022-03-09 PROCEDURE — 96417 CHEMO IV INFUS EACH ADDL SEQ: CPT | Mod: PN

## 2022-03-09 PROCEDURE — 96415 CHEMO IV INFUSION ADDL HR: CPT | Mod: PN

## 2022-03-09 PROCEDURE — 96416 CHEMO PROLONG INFUSE W/PUMP: CPT | Mod: PN

## 2022-03-09 PROCEDURE — 96367 TX/PROPH/DG ADDL SEQ IV INF: CPT | Mod: PN

## 2022-03-09 PROCEDURE — 63600175 PHARM REV CODE 636 W HCPCS: Mod: PN | Performed by: INTERNAL MEDICINE

## 2022-03-09 PROCEDURE — 25000003 PHARM REV CODE 250: Mod: PN | Performed by: INTERNAL MEDICINE

## 2022-03-09 PROCEDURE — A4216 STERILE WATER/SALINE, 10 ML: HCPCS | Mod: PN | Performed by: INTERNAL MEDICINE

## 2022-03-09 PROCEDURE — 96413 CHEMO IV INFUSION 1 HR: CPT | Mod: PN

## 2022-03-09 PROCEDURE — 96368 THER/DIAG CONCURRENT INF: CPT | Mod: PN

## 2022-03-09 PROCEDURE — 96375 TX/PRO/DX INJ NEW DRUG ADDON: CPT | Mod: PN

## 2022-03-09 RX ORDER — LORAZEPAM 2 MG/ML
1 INJECTION INTRAMUSCULAR
Status: COMPLETED | OUTPATIENT
Start: 2022-03-09 | End: 2022-03-09

## 2022-03-09 RX ORDER — SODIUM CHLORIDE 0.9 % (FLUSH) 0.9 %
10 SYRINGE (ML) INJECTION
Status: DISCONTINUED | OUTPATIENT
Start: 2022-03-09 | End: 2022-03-09 | Stop reason: HOSPADM

## 2022-03-09 RX ORDER — ATROPINE SULFATE 0.4 MG/ML
0.4 INJECTION, SOLUTION ENDOTRACHEAL; INTRAMEDULLARY; INTRAMUSCULAR; INTRAVENOUS; SUBCUTANEOUS ONCE AS NEEDED
Status: COMPLETED | OUTPATIENT
Start: 2022-03-09 | End: 2022-03-09

## 2022-03-09 RX ADMIN — BEVACIZUMAB 600 MG: 400 INJECTION, SOLUTION INTRAVENOUS at 01:03

## 2022-03-09 RX ADMIN — PALONOSETRON: 0.05 INJECTION, SOLUTION INTRAVENOUS at 02:03

## 2022-03-09 RX ADMIN — SODIUM CHLORIDE: 0.9 INJECTION, SOLUTION INTRAVENOUS at 01:03

## 2022-03-09 RX ADMIN — LORAZEPAM 1 MG: 2 INJECTION INTRAMUSCULAR; INTRAVENOUS at 03:03

## 2022-03-09 RX ADMIN — LEUCOVORIN CALCIUM 1000 MG: 350 INJECTION, POWDER, LYOPHILIZED, FOR SOLUTION INTRAMUSCULAR; INTRAVENOUS at 03:03

## 2022-03-09 RX ADMIN — ATROPINE SULFATE 0.4 MG: 0.4 INJECTION, SOLUTION INTRAMUSCULAR; INTRAVENOUS; SUBCUTANEOUS at 03:03

## 2022-03-09 RX ADMIN — Medication 10 ML: at 05:03

## 2022-03-09 RX ADMIN — PROMETHAZINE HYDROCHLORIDE 6.25 MG: 25 INJECTION INTRAMUSCULAR; INTRAVENOUS at 02:03

## 2022-03-09 RX ADMIN — SODIUM CHLORIDE 440 MG: 0.9 INJECTION, SOLUTION INTRAVENOUS at 03:03

## 2022-03-09 RX ADMIN — FLUOROURACIL 6025 MG: 50 INJECTION, SOLUTION INTRAVENOUS at 05:03

## 2022-03-09 NOTE — PROGRESS NOTES
Oncology   Chemotherapy Infusion Visit    Quick Social Service Status Follow Up   Met w/ pt briefly to follow up on social and emotional needs since initiation of treatment.      SW met with pt to flolow up since her son's wedding. The wedding was this weekend ad the pt wore her wig as planned. She said she felt a little uncomfortable but had many compliments. She said it was a wonderful day but she was very tired for two days after and needed to rest. SW reinforced her efforts for self care.  SW provided emotional support.  Pt denied any other needs from SW at this time.   SW to follow.        Radha Giordano, MEDINA  03/09/2022  1:58 PM

## 2022-03-09 NOTE — PLAN OF CARE
Problem: Adult Inpatient Plan of Care  Goal: Plan of Care Review  Outcome: Ongoing, Progressing  Flowsheets (Taken 3/9/2022 1715)  Plan of Care Reviewed With: patient  Goal: Patient-Specific Goal (Individualized)  Outcome: Ongoing, Progressing  Flowsheets (Taken 3/9/2022 1715)  Anxieties, Fears or Concerns: treatment possibly being indefinite and not resectable  Individualized Care Needs: recliner, blanket, snacks, education  Patient-Specific Goals (Include Timeframe): no signs of reaction/diarrhea during treatment     Problem: Fatigue  Goal: Improved Activity Tolerance  Outcome: Ongoing, Progressing  Intervention: Promote Improved Energy  Flowsheets (Taken 3/9/2022 1715)  Fatigue Management:   paced activity encouraged   frequent rest breaks encouraged   fatigue-related activity identified  Sleep/Rest Enhancement:   relaxation techniques promoted   natural light exposure provided   family presence promoted   therapeutic touch utilized   room darkened   noise level reduced   awakenings minimized   consistent schedule promoted  Activity Management: Ambulated -L4     Problem: Fall Injury Risk  Goal: Absence of Fall and Fall-Related Injury  Outcome: Ongoing, Progressing  Intervention: Promote Injury-Free Environment  Flowsheets (Taken 3/9/2022 1715)  Safety Promotion/Fall Prevention:   Fall Risk reviewed with patient/family   instructed to call staff for mobility   supervised activity   high risk medications identified   in recliner, wheels locked   lighting adjusted   medications reviewed   room near unit station  Pt arrived to clinic for FOLFIRI and Avastin treatment and tolerated well with no changes throughout therapy. Pt educated on line care at home and aware of number to call for any pump issues. Pt with chemo spill kit and aware of how to use if needed. Pt educated on cold precautions and side effects of meds and aware of f/u appts and discharged to home in NAD.  Pt aware to  [facility] tomorrow upon call  from [facility] and to start once in hand.

## 2022-03-11 ENCOUNTER — INFUSION (OUTPATIENT)
Dept: INFUSION THERAPY | Facility: HOSPITAL | Age: 52
End: 2022-03-11
Attending: INTERNAL MEDICINE
Payer: COMMERCIAL

## 2022-03-11 VITALS
SYSTOLIC BLOOD PRESSURE: 139 MMHG | RESPIRATION RATE: 18 BRPM | HEART RATE: 66 BPM | DIASTOLIC BLOOD PRESSURE: 82 MMHG | TEMPERATURE: 98 F

## 2022-03-11 DIAGNOSIS — C77.2 METASTASIS TO INTESTINAL LYMPH NODE: Primary | ICD-10-CM

## 2022-03-11 DIAGNOSIS — C18.7 MALIGNANT NEOPLASM OF SIGMOID COLON: ICD-10-CM

## 2022-03-11 PROCEDURE — 25000003 PHARM REV CODE 250: Mod: PN | Performed by: INTERNAL MEDICINE

## 2022-03-11 PROCEDURE — 63600175 PHARM REV CODE 636 W HCPCS: Mod: PN | Performed by: INTERNAL MEDICINE

## 2022-03-11 PROCEDURE — A4216 STERILE WATER/SALINE, 10 ML: HCPCS | Mod: PN | Performed by: INTERNAL MEDICINE

## 2022-03-11 RX ORDER — SODIUM CHLORIDE 0.9 % (FLUSH) 0.9 %
10 SYRINGE (ML) INJECTION
Status: DISCONTINUED | OUTPATIENT
Start: 2022-03-11 | End: 2022-03-11 | Stop reason: HOSPADM

## 2022-03-11 RX ORDER — HEPARIN 100 UNIT/ML
500 SYRINGE INTRAVENOUS
Status: DISCONTINUED | OUTPATIENT
Start: 2022-03-11 | End: 2022-03-11 | Stop reason: HOSPADM

## 2022-03-11 RX ADMIN — Medication 500 UNITS: at 02:03

## 2022-03-11 RX ADMIN — Medication 10 ML: at 02:03

## 2022-03-11 NOTE — PLAN OF CARE
Problem: Adult Inpatient Plan of Care  Goal: Plan of Care Review  Outcome: Ongoing, Progressing  Flowsheets (Taken 3/11/2022 9343)  Plan of Care Reviewed With: patient  Goal: Patient-Specific Goal (Individualized)  Outcome: Ongoing, Progressing     Problem: Fatigue  Goal: Improved Activity Tolerance  Outcome: Ongoing, Progressing   Pt tolerated pump d/c well.   No adverse reaction noted.  PAC flushed with Heparin and de-accessed per protocol.    Pt left clinic in no acute distress.

## 2022-03-21 ENCOUNTER — OFFICE VISIT (OUTPATIENT)
Dept: HEMATOLOGY/ONCOLOGY | Facility: CLINIC | Age: 52
End: 2022-03-21
Payer: COMMERCIAL

## 2022-03-21 ENCOUNTER — PATIENT MESSAGE (OUTPATIENT)
Dept: HEMATOLOGY/ONCOLOGY | Facility: CLINIC | Age: 52
End: 2022-03-21

## 2022-03-21 ENCOUNTER — PATIENT MESSAGE (OUTPATIENT)
Dept: ENDOCRINOLOGY | Facility: CLINIC | Age: 52
End: 2022-03-21
Payer: COMMERCIAL

## 2022-03-21 ENCOUNTER — LAB VISIT (OUTPATIENT)
Dept: LAB | Facility: HOSPITAL | Age: 52
End: 2022-03-21
Attending: INTERNAL MEDICINE
Payer: COMMERCIAL

## 2022-03-21 VITALS
BODY MASS INDEX: 47.09 KG/M2 | OXYGEN SATURATION: 98 % | TEMPERATURE: 97 F | RESPIRATION RATE: 18 BRPM | SYSTOLIC BLOOD PRESSURE: 106 MMHG | WEIGHT: 293 LBS | DIASTOLIC BLOOD PRESSURE: 78 MMHG | HEART RATE: 83 BPM | HEIGHT: 66 IN

## 2022-03-21 DIAGNOSIS — K76.0 FATTY LIVER: ICD-10-CM

## 2022-03-21 DIAGNOSIS — E83.39 HYPOPHOSPHATEMIA: ICD-10-CM

## 2022-03-21 DIAGNOSIS — R74.01 TRANSAMINITIS: ICD-10-CM

## 2022-03-21 DIAGNOSIS — E04.2 MULTINODULAR GOITER: Primary | ICD-10-CM

## 2022-03-21 DIAGNOSIS — F41.9 ANXIETY: ICD-10-CM

## 2022-03-21 DIAGNOSIS — C77.9 SECONDARY ADENOCARCINOMA OF LYMPH NODE: ICD-10-CM

## 2022-03-21 DIAGNOSIS — N20.0 RENAL STONES: ICD-10-CM

## 2022-03-21 DIAGNOSIS — E83.52 HYPERCALCEMIA: ICD-10-CM

## 2022-03-21 DIAGNOSIS — C18.7 MALIGNANT NEOPLASM OF SIGMOID COLON: Primary | ICD-10-CM

## 2022-03-21 DIAGNOSIS — C77.2 METASTASIS TO INTESTINAL LYMPH NODE: ICD-10-CM

## 2022-03-21 DIAGNOSIS — C18.7 MALIGNANT NEOPLASM OF SIGMOID COLON: ICD-10-CM

## 2022-03-21 DIAGNOSIS — K59.1 FUNCTIONAL DIARRHEA: ICD-10-CM

## 2022-03-21 DIAGNOSIS — R11.0 NAUSEA: ICD-10-CM

## 2022-03-21 LAB
ALBUMIN SERPL BCP-MCNC: 3.8 G/DL (ref 3.5–5.2)
ALP SERPL-CCNC: 161 U/L (ref 55–135)
ALT SERPL W/O P-5'-P-CCNC: 85 U/L (ref 10–44)
ANION GAP SERPL CALC-SCNC: 11 MMOL/L (ref 8–16)
AST SERPL-CCNC: 51 U/L (ref 10–40)
BASOPHILS # BLD AUTO: 0.02 K/UL (ref 0–0.2)
BASOPHILS NFR BLD: 0.6 % (ref 0–1.9)
BILIRUB SERPL-MCNC: 0.6 MG/DL (ref 0.1–1)
BILIRUB UR QL STRIP: NEGATIVE
BUN SERPL-MCNC: 11 MG/DL (ref 6–20)
CALCIUM SERPL-MCNC: 10.6 MG/DL (ref 8.7–10.5)
CHLORIDE SERPL-SCNC: 106 MMOL/L (ref 95–110)
CLARITY UR: CLEAR
CO2 SERPL-SCNC: 24 MMOL/L (ref 23–29)
COLOR UR: YELLOW
CREAT SERPL-MCNC: 0.9 MG/DL (ref 0.5–1.4)
DIFFERENTIAL METHOD: ABNORMAL
EOSINOPHIL # BLD AUTO: 0.2 K/UL (ref 0–0.5)
EOSINOPHIL NFR BLD: 4.5 % (ref 0–8)
ERYTHROCYTE [DISTWIDTH] IN BLOOD BY AUTOMATED COUNT: 14.9 % (ref 11.5–14.5)
EST. GFR  (AFRICAN AMERICAN): >60 ML/MIN/1.73 M^2
EST. GFR  (NON AFRICAN AMERICAN): >60 ML/MIN/1.73 M^2
GLUCOSE SERPL-MCNC: 109 MG/DL (ref 70–110)
GLUCOSE UR QL STRIP: NEGATIVE
HCT VFR BLD AUTO: 44.4 % (ref 37–48.5)
HGB BLD-MCNC: 14.4 G/DL (ref 12–16)
HGB UR QL STRIP: NEGATIVE
IMM GRANULOCYTES # BLD AUTO: 0.01 K/UL (ref 0–0.04)
IMM GRANULOCYTES NFR BLD AUTO: 0.3 % (ref 0–0.5)
KETONES UR QL STRIP: NEGATIVE
LEUKOCYTE ESTERASE UR QL STRIP: NEGATIVE
LYMPHOCYTES # BLD AUTO: 1 K/UL (ref 1–4.8)
LYMPHOCYTES NFR BLD: 28.7 % (ref 18–48)
MAGNESIUM SERPL-MCNC: 2 MG/DL (ref 1.6–2.6)
MCH RBC QN AUTO: 31 PG (ref 27–31)
MCHC RBC AUTO-ENTMCNC: 32.4 G/DL (ref 32–36)
MCV RBC AUTO: 96 FL (ref 82–98)
MONOCYTES # BLD AUTO: 0.5 K/UL (ref 0.3–1)
MONOCYTES NFR BLD: 13.4 % (ref 4–15)
NEUTROPHILS # BLD AUTO: 1.8 K/UL (ref 1.8–7.7)
NEUTROPHILS NFR BLD: 52.5 % (ref 38–73)
NITRITE UR QL STRIP: NEGATIVE
NRBC BLD-RTO: 0 /100 WBC
PH UR STRIP: 6 [PH] (ref 5–8)
PHOSPHATE SERPL-MCNC: 2.4 MG/DL (ref 2.7–4.5)
PLATELET # BLD AUTO: 221 K/UL (ref 150–450)
PMV BLD AUTO: 10.1 FL (ref 9.2–12.9)
POTASSIUM SERPL-SCNC: 3.9 MMOL/L (ref 3.5–5.1)
PROT SERPL-MCNC: 7 G/DL (ref 6–8.4)
PROT UR QL STRIP: NEGATIVE
RBC # BLD AUTO: 4.64 M/UL (ref 4–5.4)
SODIUM SERPL-SCNC: 141 MMOL/L (ref 136–145)
SP GR UR STRIP: 1.02 (ref 1–1.03)
URN SPEC COLLECT METH UR: NORMAL
WBC # BLD AUTO: 3.35 K/UL (ref 3.9–12.7)

## 2022-03-21 PROCEDURE — 99999 PR PBB SHADOW E&M-EST. PATIENT-LVL IV: CPT | Mod: PBBFAC,,, | Performed by: INTERNAL MEDICINE

## 2022-03-21 PROCEDURE — 85025 COMPLETE CBC W/AUTO DIFF WBC: CPT | Mod: PN | Performed by: INTERNAL MEDICINE

## 2022-03-21 PROCEDURE — 84100 ASSAY OF PHOSPHORUS: CPT | Mod: PN | Performed by: INTERNAL MEDICINE

## 2022-03-21 PROCEDURE — 99999 PR PBB SHADOW E&M-EST. PATIENT-LVL IV: ICD-10-PCS | Mod: PBBFAC,,, | Performed by: INTERNAL MEDICINE

## 2022-03-21 PROCEDURE — 36415 COLL VENOUS BLD VENIPUNCTURE: CPT | Mod: PN | Performed by: INTERNAL MEDICINE

## 2022-03-21 PROCEDURE — 99215 OFFICE O/P EST HI 40 MIN: CPT | Mod: S$GLB,,, | Performed by: INTERNAL MEDICINE

## 2022-03-21 PROCEDURE — 3074F SYST BP LT 130 MM HG: CPT | Mod: CPTII,S$GLB,, | Performed by: INTERNAL MEDICINE

## 2022-03-21 PROCEDURE — 99215 PR OFFICE/OUTPT VISIT, EST, LEVL V, 40-54 MIN: ICD-10-PCS | Mod: S$GLB,,, | Performed by: INTERNAL MEDICINE

## 2022-03-21 PROCEDURE — 1159F PR MEDICATION LIST DOCUMENTED IN MEDICAL RECORD: ICD-10-PCS | Mod: CPTII,S$GLB,, | Performed by: INTERNAL MEDICINE

## 2022-03-21 PROCEDURE — 3074F PR MOST RECENT SYSTOLIC BLOOD PRESSURE < 130 MM HG: ICD-10-PCS | Mod: CPTII,S$GLB,, | Performed by: INTERNAL MEDICINE

## 2022-03-21 PROCEDURE — 3078F DIAST BP <80 MM HG: CPT | Mod: CPTII,S$GLB,, | Performed by: INTERNAL MEDICINE

## 2022-03-21 PROCEDURE — 3078F PR MOST RECENT DIASTOLIC BLOOD PRESSURE < 80 MM HG: ICD-10-PCS | Mod: CPTII,S$GLB,, | Performed by: INTERNAL MEDICINE

## 2022-03-21 PROCEDURE — 83735 ASSAY OF MAGNESIUM: CPT | Mod: PN | Performed by: INTERNAL MEDICINE

## 2022-03-21 PROCEDURE — 3008F PR BODY MASS INDEX (BMI) DOCUMENTED: ICD-10-PCS | Mod: CPTII,S$GLB,, | Performed by: INTERNAL MEDICINE

## 2022-03-21 PROCEDURE — 1159F MED LIST DOCD IN RCRD: CPT | Mod: CPTII,S$GLB,, | Performed by: INTERNAL MEDICINE

## 2022-03-21 PROCEDURE — 81003 URINALYSIS AUTO W/O SCOPE: CPT | Mod: PN | Performed by: INTERNAL MEDICINE

## 2022-03-21 PROCEDURE — 3008F BODY MASS INDEX DOCD: CPT | Mod: CPTII,S$GLB,, | Performed by: INTERNAL MEDICINE

## 2022-03-21 PROCEDURE — 80053 COMPREHEN METABOLIC PANEL: CPT | Mod: PN | Performed by: INTERNAL MEDICINE

## 2022-03-21 RX ORDER — SODIUM CHLORIDE 0.9 % (FLUSH) 0.9 %
10 SYRINGE (ML) INJECTION
Status: CANCELLED | OUTPATIENT
Start: 2022-03-25

## 2022-03-21 RX ORDER — HEPARIN 100 UNIT/ML
500 SYRINGE INTRAVENOUS
Status: CANCELLED | OUTPATIENT
Start: 2022-03-25

## 2022-03-21 RX ORDER — HEPARIN 100 UNIT/ML
500 SYRINGE INTRAVENOUS
Status: CANCELLED | OUTPATIENT
Start: 2022-03-23

## 2022-03-21 RX ORDER — LORAZEPAM 2 MG/ML
1 INJECTION INTRAMUSCULAR
Status: CANCELLED | OUTPATIENT
Start: 2022-03-23

## 2022-03-21 RX ORDER — ATROPINE SULFATE 0.4 MG/ML
0.4 INJECTION, SOLUTION ENDOTRACHEAL; INTRAMEDULLARY; INTRAMUSCULAR; INTRAVENOUS; SUBCUTANEOUS ONCE AS NEEDED
Status: CANCELLED | OUTPATIENT
Start: 2022-03-23 | End: 2033-08-18

## 2022-03-21 RX ORDER — SODIUM CHLORIDE 0.9 % (FLUSH) 0.9 %
10 SYRINGE (ML) INJECTION
Status: CANCELLED | OUTPATIENT
Start: 2022-03-23

## 2022-03-21 NOTE — PROGRESS NOTES
PROGRESS NOTE    Subjective:       Patient ID: Gail Mckinnon is a 51 y.o. female.  MRN: [id number]  : 1970    Chief Complaint: Malignant neoplasm of sigmoid colon     History of Present Illness:   Gail Mckinnon is a 51 y.o. female who presents with colon cancer, initially stage III and now with LN recurrence.       She completed adjuvant FOLFOX in 2020. She tolerated only 4 cycles of FOLFOX before she developed an infusion reaction to oxaliplatin in cycle 5 was well as cycle 6. She then completed 6 cycles of infusional 5 FU.     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 She is now on second line therapy with FOLFIRI and Avastin.         She presented to the ED on  with left flank pain. CT renal stone study showed Moderate hydronephrosis on the left secondary to 3 mm calculus at the UPJ.     Interim history:  She presents to discuss symptoms, labs,and further recommendations.   Sancuso patch helps with nausea.   Had constipation, some rectal bleeding but has had diarrhea now for 3 days. Bleeding was on toilet paper, has resolved.   One episode of palpitations and felt like passing out, on out of her car, took a few deep breaths and felt better.  This was similar to her previous anxiety attacks, has not had any since then.   She reports a stable weight, appetite and performance status.     Oncology History:  Oncology History   Malignant neoplasm of sigmoid colon   3/16/2020 Initial Diagnosis    Malignant neoplasm of sigmoid colon     3/31/2020 Cancer Staged    Staging form: Colon and Rectum, AJCC 8th Edition  - Clinical stage from 3/31/2020: Stage IIIC (cT4b, cN2a, cM0)     2020 - 2020 Chemotherapy    Treatment Summary   Plan Name: OP FOLFOX 6 Q2W  Treatment Goal: Curative  Status: Inactive  Start Date: 2020  End Date: 2020  Provider: Dylan Leyva MD  Chemotherapy: fluorouraciL  injection 945 mg, 400 mg/m2 = 945 mg, Intravenous, Clinic/[facility] 1 time, 14 of 14 cycles  Administration: 945 mg (5/6/2020), 945 mg (5/20/2020), 945 mg (6/3/2020), 945 mg (6/17/2020), 945 mg (7/29/2020), 945 mg (8/12/2020), 945 mg (8/26/2020), 945 mg (9/9/2020), 945 mg (9/23/2020), 945 mg (10/6/2020), 945 mg (10/21/2020), 945 mg (11/4/2020)  fluorouraciL 2,400 mg/m2 = 5,665 mg in sodium chloride 0.9% 240 mL chemo infusion, 2,400 mg/m2 = 5,665 mg, Intravenous, Over 46 hours, 14 of 14 cycles  Administration: 5,665 mg (5/6/2020), 5,665 mg (5/20/2020), 5,665 mg (6/3/2020), 5,665 mg (6/17/2020), 5,665 mg (7/29/2020), 5,665 mg (8/12/2020), 5,665 mg (8/26/2020), 5,665 mg (9/9/2020), 5,665 mg (9/23/2020), 5,665 mg (10/6/2020), 5,665 mg (10/21/2020), 5,665 mg (11/4/2020)  leucovorin calcium 900 mg in dextrose 5 % 250 mL infusion, 945 mg, Intravenous, Clinic/[facility] 1 time, 14 of 14 cycles  Administration: 900 mg (5/6/2020), 900 mg (5/20/2020), 900 mg (6/3/2020), 900 mg (6/17/2020), 945 mg (6/30/2020), 945 mg (7/20/2020), 945 mg (7/29/2020), 945 mg (8/12/2020), 945 mg (8/26/2020), 945 mg (9/9/2020), 945 mg (9/23/2020), 945 mg (10/6/2020), 945 mg (10/21/2020), 945 mg (11/4/2020)  oxaliplatin (ELOXATIN) 200 mg in dextrose 5 % 500 mL chemo infusion, 201 mg, Intravenous, Clinic/[facility] 1 time, 6 of 6 cycles  Dose modification: 65 mg/m2 (original dose 85 mg/m2, Cycle 6)  Administration: 200 mg (5/6/2020), 200 mg (5/20/2020), 200 mg (6/3/2020), 200 mg (6/17/2020), 201 mg (6/30/2020), 150 mg (7/20/2020)     6/16/2021 -  Chemotherapy    Treatment Summary   Plan Name: OP COLORECTAL FOLFIRI + BEVACIZUMAB Q2W  Treatment Goal: Control  Status: Active  Start Date: 6/16/2021  End Date: 5/20/2022 (Planned)  Provider: Marah Santo MD  Chemotherapy: fluorouraciL injection 990 mg, 400 mg/m2 = 990 mg, Intravenous, Clinic/[facility]OD 1 time, 15 of 15 cycles  Administration: 990 mg (6/16/2021), 990 mg (6/30/2021), 990 mg (7/14/2021), 980 mg (7/28/2021), 990 mg  (8/11/2021), 990 mg (8/25/2021), 990 mg (9/8/2021), 990 mg (9/22/2021), 990 mg (10/6/2021), 990 mg (10/20/2021), 990 mg (11/3/2021), 990 mg (12/1/2021), 990 mg (12/15/2021), 990 mg (11/17/2021), 990 mg (12/28/2021)  fluorouraciL 2,400 mg/m2 = 5,950 mg in sodium chloride 0.9% 240 mL chemo infusion, 2,400 mg/m2 = 5,950 mg, Intravenous, Over 46 hours, 19 of 24 cycles  Administration: 5,950 mg (6/16/2021), 5,950 mg (6/30/2021), 5,950 mg (7/14/2021), 5,880 mg (7/28/2021), 5,930 mg (8/11/2021), 5,930 mg (8/25/2021), 5,930 mg (9/8/2021), 5,930 mg (9/22/2021), 5,930 mg (10/6/2021), 5,930 mg (10/20/2021), 5,930 mg (11/3/2021), 5,930 mg (12/1/2021), 5,930 mg (12/15/2021), 5,930 mg (11/17/2021), 5,930 mg (12/28/2021), 6,025 mg (3/9/2022), 6,025 mg (2/23/2022), 5,930 mg (2/2/2022), 5,930 mg (1/19/2022)  bevacizumab (AVASTIN) 600 mg in sodium chloride 0.9% 100 mL chemo infusion, 660 mg, Intravenous, Clinic/HOD 1 time, 19 of 24 cycles  Administration: 600 mg (6/30/2021), 600 mg (7/14/2021), 600 mg (7/28/2021), 600 mg (6/16/2021), 600 mg (8/11/2021), 655 mg (8/25/2021), 600 mg (9/8/2021), 600 mg (9/22/2021), 600 mg (10/6/2021), 600 mg (10/20/2021), 600 mg (11/3/2021), 655 mg (12/1/2021), 600 mg (12/15/2021), 600 mg (11/17/2021), 600 mg (12/28/2021), 600 mg (3/9/2022), 600 mg (2/23/2022), 600 mg (2/2/2022), 600 mg (1/19/2022)  irinotecan (CAMPTOSAR) 440 mg in sodium chloride 0.9% 500 mL chemo infusion, 446 mg, Intravenous, Clinic/[facility] 1 time, 19 of 24 cycles  Administration: 440 mg (6/16/2021), 440 mg (6/30/2021), 440 mg (7/14/2021), 440 mg (7/28/2021), 440 mg (8/11/2021), 444 mg (8/25/2021), 440 mg (9/8/2021), 440 mg (9/22/2021), 440 mg (10/6/2021), 440 mg (10/20/2021), 440 mg (11/3/2021), 440 mg (12/1/2021), 440 mg (12/15/2021), 440 mg (11/17/2021), 440 mg (12/28/2021), 440 mg (3/9/2022), 440 mg (2/23/2022), 440 mg (2/2/2022), 440 mg (1/19/2022)  leucovorin calcium 400 mg/m2 = 990 mg in dextrose 5 % 250 mL infusion, 400 mg/m2 =  990 mg, Intravenous, Clinic/HOD 1 time, 19 of 24 cycles  Administration: 990 mg (6/16/2021), 990 mg (6/30/2021), 990 mg (7/14/2021), 980 mg (7/28/2021), 990 mg (8/11/2021), 990 mg (8/25/2021), 990 mg (9/8/2021), 990 mg (9/22/2021), 990 mg (10/6/2021), 990 mg (10/20/2021), 990 mg (11/3/2021), 990 mg (12/1/2021), 990 mg (12/15/2021), 990 mg (11/17/2021), 990 mg (12/28/2021), 1,000 mg (3/9/2022), 1,000 mg (2/23/2022), 990 mg (2/2/2022), 990 mg (1/19/2022)     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   4/3/2020 Initial Diagnosis    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     5/6/2020 - 11/17/2020 Chemotherapy    Treatment Summary   Plan Name: OP FOLFOX 6 Q2W  Treatment Goal: Curative  Status: Inactive  Start Date: 5/6/2020  End Date: 11/6/2020  Provider: Dylan Leyva MD  Chemotherapy: fluorouraciL injection 945 mg, 400 mg/m2 = 945 mg, Intravenous, Clinic/HOD 1 time, 14 of 14 cycles  Administration: 945 mg (5/6/2020), 945 mg (5/20/2020), 945 mg (6/3/2020), 945 mg (6/17/2020), 945 mg (7/29/2020), 945 mg (8/12/2020), 945 mg (8/26/2020), 945 mg (9/9/2020), 945 mg (9/23/2020), 945 mg (10/6/2020), 945 mg (10/21/2020), 945 mg (11/4/2020)  fluorouraciL 2,400 mg/m2 = 5,665 mg in sodium chloride 0.9% 240 mL chemo infusion, 2,400 mg/m2 = 5,665 mg, Intravenous, Over 46 hours, 14 of 14 cycles  Administration: 5,665 mg (5/6/2020), 5,665 mg (5/20/2020), 5,665 mg (6/3/2020), 5,665 mg (6/17/2020), 5,665 mg (7/29/2020), 5,665 mg (8/12/2020), 5,665 mg (8/26/2020), 5,665 mg (9/9/2020), 5,665 mg (9/23/2020), 5,665 mg (10/6/2020), 5,665 mg (10/21/2020), 5,665 mg (11/4/2020)  leucovorin calcium 900 mg in dextrose 5 % 250 mL infusion, 945 mg, Intravenous, Clinic/[facility] 1 time, 14 of 14 cycles  Administration: 900 mg (5/6/2020), 900 mg (5/20/2020), 900 mg (6/3/2020), 900 mg (6/17/2020), 945 mg (6/30/2020), 945 mg (7/20/2020), 945 mg (7/29/2020), 945 mg (8/12/2020), 945 mg (8/26/2020), 945 mg (9/9/2020), 945 mg (9/23/2020), 945 mg (10/6/2020), 945 mg  (10/21/2020), 945 mg (11/4/2020)  oxaliplatin (ELOXATIN) 200 mg in dextrose 5 % 500 mL chemo infusion, 201 mg, Intravenous, Clinic/HOD 1 time, 6 of 6 cycles  Dose modification: 65 mg/m2 (original dose 85 mg/m2, Cycle 6)  Administration: 200 mg (5/6/2020), 200 mg (5/20/2020), 200 mg (6/3/2020), 200 mg (6/17/2020), 201 mg (6/30/2020), 150 mg (7/20/2020)     6/16/2021 -  Chemotherapy    Treatment Summary   Plan Name: OP COLORECTAL FOLFIRI + BEVACIZUMAB Q2W  Treatment Goal: Control  Status: Active  Start Date: 6/16/2021  End Date: 5/20/2022 (Planned)  Provider: Marah Santo MD  Chemotherapy: fluorouraciL injection 990 mg, 400 mg/m2 = 990 mg, Intravenous, Clinic/HOD 1 time, 15 of 15 cycles  Administration: 990 mg (6/16/2021), 990 mg (6/30/2021), 990 mg (7/14/2021), 980 mg (7/28/2021), 990 mg (8/11/2021), 990 mg (8/25/2021), 990 mg (9/8/2021), 990 mg (9/22/2021), 990 mg (10/6/2021), 990 mg (10/20/2021), 990 mg (11/3/2021), 990 mg (12/1/2021), 990 mg (12/15/2021), 990 mg (11/17/2021), 990 mg (12/28/2021)  fluorouraciL 2,400 mg/m2 = 5,950 mg in sodium chloride 0.9% 240 mL chemo infusion, 2,400 mg/m2 = 5,950 mg, Intravenous, Over 46 hours, 19 of 24 cycles  Administration: 5,950 mg (6/16/2021), 5,950 mg (6/30/2021), 5,950 mg (7/14/2021), 5,880 mg (7/28/2021), 5,930 mg (8/11/2021), 5,930 mg (8/25/2021), 5,930 mg (9/8/2021), 5,930 mg (9/22/2021), 5,930 mg (10/6/2021), 5,930 mg (10/20/2021), 5,930 mg (11/3/2021), 5,930 mg (12/1/2021), 5,930 mg (12/15/2021), 5,930 mg (11/17/2021), 5,930 mg (12/28/2021), 6,025 mg (3/9/2022), 6,025 mg (2/23/2022), 5,930 mg (2/2/2022), 5,930 mg (1/19/2022)  bevacizumab (AVASTIN) 600 mg in sodium chloride 0.9% 100 mL chemo infusion, 660 mg, Intravenous, [facility] 1 time, 19 of 24 cycles  Administration: 600 mg (6/30/2021), 600 mg (7/14/2021), 600 mg (7/28/2021), 600 mg (6/16/2021), 600 mg (8/11/2021), 655 mg (8/25/2021), 600 mg (9/8/2021), 600 mg (9/22/2021), 600 mg (10/6/2021), 600 mg  (10/20/2021), 600 mg (11/3/2021), 655 mg (2021), 600 mg (12/15/2021), 600 mg (2021), 600 mg (2021), 600 mg (3/9/2022), 600 mg (2022), 600 mg (2022), 600 mg (2022)  irinotecan (CAMPTOSAR) 440 mg in sodium chloride 0.9% 500 mL chemo infusion, 446 mg, Intravenous, Clinic/HOD 1 time, 19 of 24 cycles  Administration: 440 mg (2021), 440 mg (2021), 440 mg (2021), 440 mg (2021), 440 mg (2021), 444 mg (2021), 440 mg (2021), 440 mg (2021), 440 mg (10/6/2021), 440 mg (10/20/2021), 440 mg (11/3/2021), 440 mg (2021), 440 mg (12/15/2021), 440 mg (2021), 440 mg (2021), 440 mg (3/9/2022), 440 mg (2022), 440 mg (2022), 440 mg (2022)  leucovorin calcium 400 mg/m2 = 990 mg in dextrose 5 % 250 mL infusion, 400 mg/m2 = 990 mg, Intravenous, Clinic/HOD 1 time, 19 of 24 cycles  Administration: 990 mg (2021), 990 mg (2021), 990 mg (2021), 980 mg (2021), 990 mg (2021), 990 mg (2021), 990 mg (2021), 990 mg (2021), 990 mg (10/6/2021), 990 mg (10/20/2021), 990 mg (11/3/2021), 990 mg (2021), 990 mg (12/15/2021), 990 mg (2021), 990 mg (2021), 1,000 mg (3/9/2022), 1,000 mg (2022), 990 mg (2022), 990 mg (2022)         History:  Past Medical History:   Diagnosis Date    Anxiety     Depression     FH: ovarian cancer 3/16/2020    Hx of psychiatric care     Effexor, Paxil, Lexapro, Zoloft, Wellbutrin, Trazodone Buspar    Hyperthyroidism     Hypothyroid     Kidney calculi     Malignant neoplasm of sigmoid colon 3/16/2020    Menorrhagia     Multinodular goiter 2012    Palpitation     Psychiatric problem     Venous insufficiency       Past Surgical History:   Procedure Laterality Date     SECTION, CLASSIC      x3    COLONOSCOPY N/A 2020    Procedure: COLONOSCOPY;  Surgeon: Shane Parker MD;  Location: [facility];  Service: Endoscopy;  Laterality:  N/A;    CYSTOSCOPY W/ URETERAL STENT PLACEMENT Bilateral 3/25/2020    Procedure: CYSTOSCOPY, WITH URETERAL STENT INSERTION;  Surgeon: Claudio Tyson MD;  Location: [facility] OR 2ND FLR;  Service: Urology;  Laterality: Bilateral;    INSERTION OF TUNNELED CENTRAL VENOUS CATHETER (CVC) WITH SUBCUTANEOUS PORT N/A 5/1/2020    Procedure: [id number];  Surgeon: Dosc Diagnostic Provider;  Location: [facility] OR 2ND FLR;  Service: Radiology;  Laterality: N/A;  Room 189/Cindy    LAPAROSCOPIC SIGMOIDECTOMY N/A 3/25/2020    Procedure: COLECTOMY, SIGMOID, LAPAROSCOPIC, flex sig, ERAS high;  Surgeon: Silvio Man MD;  Location: [facility] OR 2ND FLR;  Service: Colon and Rectal;  Laterality: N/A;    MOBILIZATION OF SPLENIC FLEXURE  3/25/2020    Procedure: MOBILIZATION, SPLENIC FLEXURE;  Surgeon: Silvio Man MD;  Location: [facility] OR 2ND FLR;  Service: Colon and Rectal;;    tonsillectomy      TOTAL ABDOMINAL HYSTERECTOMY W/ BILATERAL SALPINGOOPHORECTOMY N/A 3/25/2020    Procedure: HYSTERECTOMY, TOTAL, ABDOMINAL, WITH BILATERAL SALPINGO-OOPHORECTOMY;  Surgeon: Keron Brady MD;  Location: [facility] OR 2ND FLR;  Service: Oncology;  Laterality: N/A;     Family History   Problem Relation Age of Onset    Heart disease Father     Diabetes Mother 65    Drug abuse Brother     Drug abuse Brother     Cancer Maternal Aunt         lung cancer    Cancer Maternal Grandmother         stomach cancer- started in ovaries    Ovarian cancer Maternal Grandmother         stomach cancer- started in ovaries    Colon cancer Paternal Uncle     Cancer Paternal Uncle     Ovarian cancer Maternal Aunt     Ovarian cancer Maternal Aunt     Ovarian cancer Cousin         mother had ovarian cancer    Drug abuse Son     Drug abuse Son         clean/sober since 2012    Colon cancer Son     No Known Problems Daughter     Uterine cancer Neg Hx     Breast cancer Neg Hx      Social History     Tobacco Use    Smoking status: Never Smoker  "   Smokeless tobacco: Never Used   Substance and Sexual Activity    Alcohol use: Yes     Comment: occasionally- twice monthly    Drug use: Never    Sexual activity: Yes     Partners: Male     Birth control/protection: See Surgical Hx        ROS:   Review of Systems   Constitutional: Negative for fever, malaise/fatigue and weight loss.   HENT: Negative for congestion, hearing loss, nosebleeds and sore throat.    Eyes: Negative for double vision and photophobia.   Respiratory: Negative for cough, hemoptysis, sputum production, shortness of breath and wheezing.    Cardiovascular: Negative for chest pain, palpitations, orthopnea and leg swelling.   Gastrointestinal: Positive for constipation and diarrhea. Negative for abdominal pain, blood in stool, heartburn, nausea and vomiting.   Genitourinary: Negative for dysuria, frequency, hematuria and urgency.   Musculoskeletal: Positive for back pain. Negative for joint pain and myalgias.   Skin: Negative for itching and rash.   Neurological: Negative for dizziness, tingling, seizures, weakness and headaches.   Endo/Heme/Allergies: Negative for polydipsia. Does not bruise/bleed easily.   Psychiatric/Behavioral: Negative for depression and memory loss. The patient is nervous/anxious. The patient does not have insomnia.         Objective:     Vitals:    03/21/22 1500   BP: 106/78   Pulse: 83   Resp: 18   Temp: 97.1 °F (36.2 °C)   TempSrc: Temporal   SpO2: 98%   Weight: 134.1 kg (295 lb 10.2 oz)   Height: 5' 6" (1.676 m)   PainSc: 0-No pain     Wt Readings from Last 10 Encounters:   03/21/22 134.1 kg (295 lb 10.2 oz)   03/09/22 135 kg (297 lb 9.9 oz)   03/07/22 135 kg (297 lb 9.9 oz)   02/23/22 135.5 kg (298 lb 11.6 oz)   02/21/22 135.5 kg (298 lb 11.6 oz)   02/02/22 132.2 kg (291 lb 7.2 oz)   02/02/22 133.2 kg (293 lb 10.4 oz)   01/18/22 133.7 kg (294 lb 10.7 oz)   01/10/22 133.4 kg (294 lb 1.5 oz)   12/28/21 135.4 kg (298 lb 8.1 oz)       Physical Examination:   Physical " Exam  Vitals and nursing note reviewed.   Constitutional:       General: She is not in acute distress.     Appearance: She is not diaphoretic.   HENT:      Head: Normocephalic.      Mouth/Throat:      Pharynx: No oropharyngeal exudate.   Eyes:      General: No scleral icterus.     Conjunctiva/sclera: Conjunctivae normal.   Neck:      Thyroid: No thyromegaly.   Cardiovascular:      Rate and Rhythm: Normal rate and regular rhythm.      Heart sounds: Normal heart sounds. No murmur heard.  Pulmonary:      Effort: Pulmonary effort is normal. No respiratory distress.      Breath sounds: No stridor. No wheezing or rales.   Chest:      Chest wall: No tenderness.   Abdominal:      General: Bowel sounds are normal. There is no distension.      Palpations: Abdomen is soft. There is no mass.      Tenderness: There is no abdominal tenderness. There is no rebound.   Musculoskeletal:         General: No tenderness or deformity. Normal range of motion.      Cervical back: Neck supple.   Lymphadenopathy:      Cervical: No cervical adenopathy.   Skin:     General: Skin is warm and dry.      Findings: No erythema or rash.   Neurological:      Mental Status: She is alert and oriented to person, place, and time.      Cranial Nerves: No cranial nerve deficit.      Coordination: Coordination normal.      Gait: Gait is intact.   Psychiatric:         Mood and Affect: Affect normal.         Cognition and Memory: Memory normal.         Judgment: Judgment normal.          Diagnostic Tests:  Significant Imaging: I have reviewed and interpreted all pertinent imaging results/findings.        Laboratory Data:  All pertinent labs have been reviewed.  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 Oxaliplatin was stopped due to severe adverse reaction.     Follow-up CTs and PET in May 2021 showed enlarging retroperitoneal node, concerning for metastatic disease.      She is tolerating FOLFIRI + Avastin well and has completed 16 cycles so far. Most recent PET scan showed complete resolution of previously noted hypermetabolic nodes and no other sites of disease.      Her case has been discussed at the colorectal tumor board ands she is not deemed a surgical candidate. We discussed maintenance 5 FU and Avastin vs full dose chemo as tolerated. Will continue FOLFIRI and Avastin at this time.       -     CBC Auto Differential; Standing  -     CMP; Future; Expected date: 03/21/2022  -     Magnesium; Future; Expected date: 03/21/2022  -     Phosphorus; Standing    Hypercalcemia   Secondary to mild primary hyperparathyroidism.  Continue endocrinology follow-up, adequate hydration.  Avoid calcium supplementation.    Renal stones  Continue urology follow-up as needed.    Hypophosphatemia  Improved with K-Phos.  Continue at this time.    Transaminitis  Fatty liver  Mild and stable transaminitis.  Continue to monitor.    Nausea  Continue Sancuso patch.    Anxiety  Continue supportive care.  One episode of possible panic attack noted.  Continue to monitor closely and referred to cardiology in case of persistent symptoms of palpitations outside of anxiety.  Previous cardiology workup a few years ago for similar symptoms has been negative as per patient.    Functional diarrhea  Continue bowel regimen.  Refer back to  GI in case of persistent rectal bleeding.     ECOG SCORE    0 - Fully active-able to carry on all pre-disease performance without restriction           Discussion:   Follow up in about 2 weeks (around 4/4/2022) for MD, Lab Results, Chemo.    Plan was discussed with the patient at length, and she verbalized understanding. Gail was given an opportunity to ask questions that were answered to her satisfaction, and she was advised to call in the interval if any problems or questions arise.    Electronically signed by Marah Santo MD          Answers for HPI/ROS submitted by the patient on 3/21/2022  appetite change : No  unexpected weight change: No  mouth sores: Yes  visual disturbance: No  adenopathy: No

## 2022-03-21 NOTE — TELEPHONE ENCOUNTER
Pt wanting to be seen asap  See PET Scan  Soonest was Virtual in 8/29/22    Should she be seen sooner?   Soonest would be FNA slot May 2nd unless want 8:30am on a Wed

## 2022-03-23 ENCOUNTER — OFFICE VISIT (OUTPATIENT)
Dept: PSYCHIATRY | Facility: CLINIC | Age: 52
End: 2022-03-23
Payer: COMMERCIAL

## 2022-03-23 ENCOUNTER — INFUSION (OUTPATIENT)
Dept: INFUSION THERAPY | Facility: HOSPITAL | Age: 52
End: 2022-03-23
Attending: INTERNAL MEDICINE
Payer: COMMERCIAL

## 2022-03-23 VITALS
HEIGHT: 66 IN | RESPIRATION RATE: 16 BRPM | TEMPERATURE: 98 F | DIASTOLIC BLOOD PRESSURE: 89 MMHG | BODY MASS INDEX: 47.09 KG/M2 | WEIGHT: 293 LBS | HEART RATE: 86 BPM | SYSTOLIC BLOOD PRESSURE: 150 MMHG

## 2022-03-23 DIAGNOSIS — C80.1 ANXIETY ASSOCIATED WITH CANCER DIAGNOSIS: ICD-10-CM

## 2022-03-23 DIAGNOSIS — F33.1 MODERATE EPISODE OF RECURRENT MAJOR DEPRESSIVE DISORDER: Primary | ICD-10-CM

## 2022-03-23 DIAGNOSIS — C77.2 METASTASIS TO INTESTINAL LYMPH NODE: Primary | ICD-10-CM

## 2022-03-23 DIAGNOSIS — F41.1 ANXIETY ASSOCIATED WITH CANCER DIAGNOSIS: ICD-10-CM

## 2022-03-23 DIAGNOSIS — C18.7 MALIGNANT NEOPLASM OF SIGMOID COLON: ICD-10-CM

## 2022-03-23 PROCEDURE — 96375 TX/PRO/DX INJ NEW DRUG ADDON: CPT | Mod: PN

## 2022-03-23 PROCEDURE — 96413 CHEMO IV INFUSION 1 HR: CPT | Mod: PN

## 2022-03-23 PROCEDURE — 96416 CHEMO PROLONG INFUSE W/PUMP: CPT | Mod: PN

## 2022-03-23 PROCEDURE — 99999 PR PBB SHADOW E&M-EST. PATIENT-LVL I: ICD-10-PCS | Mod: PBBFAC,,, | Performed by: PSYCHOLOGIST

## 2022-03-23 PROCEDURE — 96367 TX/PROPH/DG ADDL SEQ IV INF: CPT | Mod: PN

## 2022-03-23 PROCEDURE — 25000003 PHARM REV CODE 250: Mod: PN | Performed by: INTERNAL MEDICINE

## 2022-03-23 PROCEDURE — 90834 PSYTX W PT 45 MINUTES: CPT | Mod: S$GLB,,, | Performed by: PSYCHOLOGIST

## 2022-03-23 PROCEDURE — 90834 PR PSYCHOTHERAPY W/PATIENT, 45 MIN: ICD-10-PCS | Mod: S$GLB,,, | Performed by: PSYCHOLOGIST

## 2022-03-23 PROCEDURE — 63600175 PHARM REV CODE 636 W HCPCS: Mod: PN | Performed by: INTERNAL MEDICINE

## 2022-03-23 PROCEDURE — 99999 PR PBB SHADOW E&M-EST. PATIENT-LVL I: CPT | Mod: PBBFAC,,, | Performed by: PSYCHOLOGIST

## 2022-03-23 RX ORDER — HEPARIN 100 UNIT/ML
500 SYRINGE INTRAVENOUS
Status: DISCONTINUED | OUTPATIENT
Start: 2022-03-23 | End: 2022-03-23 | Stop reason: HOSPADM

## 2022-03-23 RX ORDER — ATROPINE SULFATE 0.4 MG/ML
0.4 INJECTION, SOLUTION ENDOTRACHEAL; INTRAMEDULLARY; INTRAMUSCULAR; INTRAVENOUS; SUBCUTANEOUS ONCE AS NEEDED
Status: DISCONTINUED | OUTPATIENT
Start: 2022-03-23 | End: 2022-03-23 | Stop reason: HOSPADM

## 2022-03-23 RX ORDER — SODIUM CHLORIDE 0.9 % (FLUSH) 0.9 %
10 SYRINGE (ML) INJECTION
Status: DISCONTINUED | OUTPATIENT
Start: 2022-03-23 | End: 2022-03-23 | Stop reason: HOSPADM

## 2022-03-23 RX ORDER — LORAZEPAM 2 MG/ML
1 INJECTION INTRAMUSCULAR
Status: COMPLETED | OUTPATIENT
Start: 2022-03-23 | End: 2022-03-23

## 2022-03-23 RX ORDER — HEPARIN 100 UNIT/ML
500 SYRINGE INTRAVENOUS
Status: CANCELLED | OUTPATIENT
Start: 2022-03-24

## 2022-03-23 RX ORDER — ATROPINE SULFATE 0.4 MG/ML
0.4 INJECTION, SOLUTION ENDOTRACHEAL; INTRAMEDULLARY; INTRAMUSCULAR; INTRAVENOUS; SUBCUTANEOUS ONCE AS NEEDED
Status: CANCELLED | OUTPATIENT
Start: 2022-03-24

## 2022-03-23 RX ORDER — SODIUM CHLORIDE 0.9 % (FLUSH) 0.9 %
10 SYRINGE (ML) INJECTION
Status: CANCELLED | OUTPATIENT
Start: 2022-03-24

## 2022-03-23 RX ADMIN — LORAZEPAM 1 MG: 2 INJECTION INTRAMUSCULAR; INTRAVENOUS at 03:03

## 2022-03-23 RX ADMIN — BEVACIZUMAB 600 MG: 400 INJECTION, SOLUTION INTRAVENOUS at 01:03

## 2022-03-23 RX ADMIN — PALONOSETRON 0.25 MG: 0.05 INJECTION, SOLUTION INTRAVENOUS at 02:03

## 2022-03-23 RX ADMIN — SODIUM CHLORIDE: 0.9 INJECTION, SOLUTION INTRAVENOUS at 01:03

## 2022-03-23 RX ADMIN — PROMETHAZINE HYDROCHLORIDE 6.25 MG: 25 INJECTION INTRAMUSCULAR; INTRAVENOUS at 02:03

## 2022-03-23 RX ADMIN — FLUOROURACIL 6025 MG: 50 INJECTION, SOLUTION INTRAVENOUS at 05:03

## 2022-03-23 NOTE — PROGRESS NOTES
PSYCHO-ONCOLOGY NOTE/ Individual Psychotherapy     Date: 3/23/2022   Site:  [facility]      Therapeutic Intervention: Met with patient.  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    Anxiety    Insomnia    Insomnia    Anxiety    Other constipation    Nausea    Generalized anxiety disorder with panic attacks    Chemotherapy adverse reaction    Mucositis due to chemotherapy    Morbid obesity    Secondary adenocarcinoma of lymph node    Thyroid nodule    Hypercalcemia    Transaminitis    Dysuria    Chemotherapy-induced neutropenia    Hypophosphatemia    Functional diarrhea       Chief complaint/reason for encounter: adjustment to illness, depression and anxiety      Met with patient to evaluate psychosocial adaptation to diagnosis/treatment/survivorship of metastatic colon cancer    Current Medications  Current Outpatient Medications   Medication    acetaminophen (TYLENOL) 500 MG tablet    buPROPion (WELLBUTRIN SR) 150 MG TBSR 12 hr tablet    busPIRone (BUSPAR) 10 MG tablet    cyclobenzaprine (FLEXERIL) 10 MG tablet    duke's soln (benadryl 30 mL, mylanta 30 mL, LIDOcaine 30 mL, nystatin 30 mL) 120mL    Lactobacillus rhamnosus GG (CULTURELLE) 10 billion cell capsule    lactulose (CHRONULAC) 10 gram/15 mL solution    levothyroxine (SYNTHROID) 200 MCG tablet    LIDOcaine-prilocaine (EMLA) cream    multivitamin (THERAGRAN) per tablet    potassium  "phosphate, monobasic, (K-PHOS) 500 mg TbSO    promethazine (PHENERGAN) 12.5 MG Tab    tamsulosin (FLOMAX) 0.4 mg Cap    traMADoL (ULTRAM) 50 mg tablet    traZODone (DESYREL) 50 MG tablet     No current facility-administered medications for this visit.     Facility-Administered Medications Ordered in Other Visits   Medication Frequency    alteplase injection 2 mg PRN    atropine injection 0.4 mg Once PRN    fluorouraciL 2,400 mg/m2 = 6,025 mg in sodium chloride 0.9% 240 mL chemo infusion over 46 hr    heparin, porcine (PF) 100 unit/mL injection flush 500 Units PRN    irinotecan (CAMPTOSAR) 440 mg in sodium chloride 0.9% 500 mL chemo infusion 1 time in Clinic/HOD    leucovorin calcium 1,000 mg in dextrose 5 % 250 mL infusion 1 time in Clinic/HOD    sodium chloride 0.9% flush 10 mL PRN       Objective:  Gail Mckinnon met during her scheduled infusion. The patient was fully cooperative throughout the session (and amenable to a 10 minute pause-that was later made up-to manage an emergency pt situation).  Appearance: age appropriate, casually  dressed, well groomed  Behavior/Cooperation: friendly and cooperative  Speech: normal in rate, volume, and tone and appropriate quality, quantity and organization of sentences  Mood: "numb"  Affect: neutral, humorous  Thought Process: goal-directed, logical  Thought Content: normal,  No delusions or paranoia; did not appear to be responding to internal stimuli during the session  Orientation: grossly intact  Memory: grossly intact  Attention Span/Concentration: Attends to session without distraction; reports no difficulty  Fund of Knowledge: average  Estimate of Intelligence: average from verbal skills and history  Cognition: grossly intact  Insight: patient has awareness of illness; good insight into own behavior and behavior of others  Judgment: the patient's behavior is adequate to circumstances    Interval history and content of current session: Discussed " current adaptation to disease and treatment status. Reports to be attempting to cope in an active manner now that major stressors (son's wedding and official disconnection from ex) have passed. However, notes that she feels as though she does not have any life goals in the present which has reportedly made it difficult for her to have purpose/motivation. Processed recent news from tumor board conference. Reviewed presence of negative cognitions, which appear to be in evidence with moderate distress and associated w/ need for companionship/care and purpose. Provided cognitive behavioral therapy to address negative cognitions-discussing ACT principles and how she may be able to adapt components of prior interests (sewing) into new goal oriented behaviors. Encouraged increased engagement in time w/ 1 and 3 yo grandchildren as she noted that these interactions often make her feel cared for. Identified and evaluated psychosocial and environmental stressors secondary to diagnosis and treatment.  Examined proactive behaviors that may be implemented to minimize or ameliorate psychosocial stressors secondary to treatment, encouraging integration of care seeking and value based behaviors if possible.     Risk parameters:   Patient reports no suicidal ideation  Patient reports no homicidal ideation  Patient reports no self-injurious behavior  Patient reports no violent behavior   Safety needs:  None at this time      Verbal deficits: None     Patient's response to intervention:The patient's response to intervention is accepting.     Progress toward goals and other mental status changes:  The patient's progress toward goals is fair .      Progress to date:Progress - Ongoing, but Slow      Goals from last visit: Attempted, partially met        Patient Strengths: verbal, motivated, intelligent, successful, good social support, good insight, commitment to wellness, strong cultural traditions        Treatment Plan:individual  psychotherapy  ? Target symptoms: depression, anxiety, adjustment  ? Why chosen therapy is appropriate versus another modality: relevant to diagnosis, patient responds to this modality, evidence based practice  ? Outcome monitoring methods: self-report, observation, tx team feedback  ? Therapeutic intervention type: insight oriented psychotherapy, supportive psychotherapy  ? Prognosis: Good                            Behavioral goals:               Exercise: continued/increased as per physician recommendations              Stress management: appropriate boundaries and accepting aid when needed              Social engagement: continued and increased especially w/ grandchildren/family, as per CDC COVID-19 guidelines              Therapy: adaptive coping, re-engage in thought logging/cognitive restructuring, grounding exercises    Return to clinic: 2 weeks     Length of Service (minutes direct face-to-face contact): 45    Diagnosis:     ICD-10-CM ICD-9-CM   1. Moderate episode of recurrent major depressive disorder  F33.1 296.32   2. Anxiety associated with cancer diagnosis  F41.1 300.09    C80.1                 Maximo Peters License #[id number]  [place] License #[id number]

## 2022-03-23 NOTE — PLAN OF CARE
Problem: Adult Inpatient Plan of Care  Goal: Plan of Care Review  Outcome: Ongoing, Progressing  Flowsheets (Taken 3/23/2022 1730)  Plan of Care Reviewed With: patient  Goal: Patient-Specific Goal (Individualized)  3/23/2022 1841 by Joellen Bashir RN  Outcome: Ongoing, Progressing  Flowsheets (Taken 3/23/2022 1841)  Anxieties, Fears or Concerns: continuing chemo  Individualized Care Needs: recliner, snacks, blanket  Patient-Specific Goals (Include Timeframe): no s/s of rx during tx  3/23/2022 1310 by Joellen Bashir RN  Outcome: Ongoing, Progressing  Flowsheets (Taken 3/23/2022 1310)  Anxieties, Fears or Concerns: none  Individualized Care Needs: recliner, snacks, blanket  Patient-Specific Goals (Include Timeframe): no s/s of rx during tx     Problem: Fatigue  Goal: Improved Activity Tolerance  3/23/2022 1841 by Joellen Bashir RN  Outcome: Ongoing, Progressing  3/23/2022 1310 by Joellen Bashir RN  Outcome: Ongoing, Progressing  Intervention: Promote Improved Energy  3/23/2022 1841 by Joellen Bashir RN  Flowsheets (Taken 3/23/2022 1841)  Fatigue Management: frequent rest breaks encouraged  Sleep/Rest Enhancement: regular sleep/rest pattern promoted  Activity Management:   Ambulated to bathroom - L4   Ambulated in reyes - L4   Up in chair - L3  3/23/2022 1310 by Joellen Bashir RN  Flowsheets (Taken 3/23/2022 1310)  Fatigue Management: frequent rest breaks encouraged  Sleep/Rest Enhancement: regular sleep/rest pattern promoted  Activity Management:   Ambulated in reyes - L4   Ambulated to bathroom - L4   Up in chair - L3    Pt tolerated Avastin infusion well, no adverse reaction noted.   Medication coming from [facility] delayed, pt unable to stay to receive remainder of chemo regimen until 7pm this evening.  Per on-call physician, OK to connect to 5FU pump and have patient return tomorrow for irinotecan/leucovorin infusions.  Dr. Santo notified and received orders for changes in pre-medication for tomorrow  morning.

## 2022-03-24 ENCOUNTER — INFUSION (OUTPATIENT)
Dept: INFUSION THERAPY | Facility: HOSPITAL | Age: 52
End: 2022-03-24
Attending: INTERNAL MEDICINE
Payer: COMMERCIAL

## 2022-03-24 VITALS
SYSTOLIC BLOOD PRESSURE: 156 MMHG | WEIGHT: 293 LBS | TEMPERATURE: 98 F | HEART RATE: 84 BPM | OXYGEN SATURATION: 99 % | DIASTOLIC BLOOD PRESSURE: 93 MMHG | BODY MASS INDEX: 47.09 KG/M2 | RESPIRATION RATE: 20 BRPM | HEIGHT: 66 IN

## 2022-03-24 DIAGNOSIS — C77.2 METASTASIS TO INTESTINAL LYMPH NODE: Primary | ICD-10-CM

## 2022-03-24 DIAGNOSIS — C18.7 MALIGNANT NEOPLASM OF SIGMOID COLON: ICD-10-CM

## 2022-03-24 PROCEDURE — 25000003 PHARM REV CODE 250: Mod: PN | Performed by: INTERNAL MEDICINE

## 2022-03-24 PROCEDURE — 96415 CHEMO IV INFUSION ADDL HR: CPT | Mod: PN

## 2022-03-24 PROCEDURE — 96368 THER/DIAG CONCURRENT INF: CPT | Mod: PN

## 2022-03-24 PROCEDURE — 63600175 PHARM REV CODE 636 W HCPCS: Mod: PN | Performed by: INTERNAL MEDICINE

## 2022-03-24 PROCEDURE — 96413 CHEMO IV INFUSION 1 HR: CPT | Mod: PN

## 2022-03-24 PROCEDURE — 96367 TX/PROPH/DG ADDL SEQ IV INF: CPT | Mod: PN

## 2022-03-24 RX ORDER — ATROPINE SULFATE 0.4 MG/ML
0.4 INJECTION, SOLUTION ENDOTRACHEAL; INTRAMEDULLARY; INTRAMUSCULAR; INTRAVENOUS; SUBCUTANEOUS ONCE AS NEEDED
Status: DISCONTINUED | OUTPATIENT
Start: 2022-03-24 | End: 2022-03-24 | Stop reason: HOSPADM

## 2022-03-24 RX ADMIN — SODIUM CHLORIDE: 0.9 INJECTION, SOLUTION INTRAVENOUS at 08:03

## 2022-03-24 RX ADMIN — SODIUM CHLORIDE 440 MG: 0.9 INJECTION, SOLUTION INTRAVENOUS at 09:03

## 2022-03-24 RX ADMIN — PROMETHAZINE HYDROCHLORIDE 12.5 MG: 25 INJECTION INTRAMUSCULAR; INTRAVENOUS at 09:03

## 2022-03-24 RX ADMIN — LEUCOVORIN CALCIUM 1000 MG: 350 INJECTION, POWDER, LYOPHILIZED, FOR SOLUTION INTRAMUSCULAR; INTRAVENOUS at 09:03

## 2022-03-24 NOTE — PLAN OF CARE
Problem: Adult Inpatient Plan of Care  Goal: Plan of Care Review  Outcome: Ongoing, Progressing  Goal: Patient-Specific Goal (Individualized)  Outcome: Ongoing, Progressing     Problem: Fatigue  Goal: Improved Activity Tolerance  Outcome: Ongoing, Progressing        Problem: Other Problem (see comments)  Goal: Other Goal  Description: Nutrition Support tolerated and meeting >85% of estimated nutrition needs.  Outcome: PROGRESSING SLOWER THAN EXPECTED

## 2022-03-24 NOTE — PLAN OF CARE
Pt tolerated irinotecan well today. 5FU reconnected to pt once irinotecan finished. Vitals remain stable. Reviewed follow-up appointments. All questions were answered, ambulated independently at d/c.

## 2022-03-25 ENCOUNTER — INFUSION (OUTPATIENT)
Dept: INFUSION THERAPY | Facility: HOSPITAL | Age: 52
End: 2022-03-25
Attending: INTERNAL MEDICINE
Payer: COMMERCIAL

## 2022-03-25 VITALS
TEMPERATURE: 98 F | HEART RATE: 83 BPM | SYSTOLIC BLOOD PRESSURE: 149 MMHG | RESPIRATION RATE: 20 BRPM | DIASTOLIC BLOOD PRESSURE: 93 MMHG

## 2022-03-25 DIAGNOSIS — C18.7 MALIGNANT NEOPLASM OF SIGMOID COLON: ICD-10-CM

## 2022-03-25 DIAGNOSIS — C77.2 METASTASIS TO INTESTINAL LYMPH NODE: Primary | ICD-10-CM

## 2022-03-25 PROCEDURE — 25000003 PHARM REV CODE 250: Mod: PN | Performed by: INTERNAL MEDICINE

## 2022-03-25 PROCEDURE — A4216 STERILE WATER/SALINE, 10 ML: HCPCS | Mod: PN | Performed by: INTERNAL MEDICINE

## 2022-03-25 PROCEDURE — 63600175 PHARM REV CODE 636 W HCPCS: Mod: PN | Performed by: INTERNAL MEDICINE

## 2022-03-25 RX ORDER — SODIUM CHLORIDE 0.9 % (FLUSH) 0.9 %
10 SYRINGE (ML) INJECTION
Status: DISCONTINUED | OUTPATIENT
Start: 2022-03-25 | End: 2022-03-25 | Stop reason: HOSPADM

## 2022-03-25 RX ORDER — HEPARIN 100 UNIT/ML
500 SYRINGE INTRAVENOUS
Status: DISCONTINUED | OUTPATIENT
Start: 2022-03-25 | End: 2022-03-25 | Stop reason: HOSPADM

## 2022-03-25 RX ADMIN — Medication 500 UNITS: at 02:03

## 2022-03-25 RX ADMIN — Medication 10 ML: at 02:03

## 2022-03-25 NOTE — PLAN OF CARE
Problem: Adult Inpatient Plan of Care  Goal: Plan of Care Review  Outcome: Ongoing, Progressing  Flowsheets (Taken 3/25/2022 1435)  Plan of Care Reviewed With: patient  Goal: Patient-Specific Goal (Individualized)  Outcome: Ongoing, Progressing     Problem: Fatigue  Goal: Improved Activity Tolerance  Outcome: Ongoing, Progressing   Pt tolerated pump d/c well.   No adverse reaction noted.  PAC flushed with Heparin and de-accessed per protocol.   Pt left clinic in no acute distress.

## 2022-04-04 ENCOUNTER — OFFICE VISIT (OUTPATIENT)
Dept: HEMATOLOGY/ONCOLOGY | Facility: CLINIC | Age: 52
End: 2022-04-04
Payer: COMMERCIAL

## 2022-04-04 ENCOUNTER — LAB VISIT (OUTPATIENT)
Dept: LAB | Facility: HOSPITAL | Age: 52
End: 2022-04-04
Attending: INTERNAL MEDICINE
Payer: COMMERCIAL

## 2022-04-04 VITALS
WEIGHT: 293 LBS | DIASTOLIC BLOOD PRESSURE: 81 MMHG | TEMPERATURE: 97 F | OXYGEN SATURATION: 98 % | BODY MASS INDEX: 47.09 KG/M2 | RESPIRATION RATE: 18 BRPM | HEIGHT: 66 IN | HEART RATE: 75 BPM | SYSTOLIC BLOOD PRESSURE: 135 MMHG

## 2022-04-04 DIAGNOSIS — D70.1 CHEMOTHERAPY-INDUCED NEUTROPENIA: ICD-10-CM

## 2022-04-04 DIAGNOSIS — E83.39 HYPOPHOSPHATEMIA: ICD-10-CM

## 2022-04-04 DIAGNOSIS — F41.9 ANXIETY: ICD-10-CM

## 2022-04-04 DIAGNOSIS — C77.2 METASTASIS TO INTESTINAL LYMPH NODE: ICD-10-CM

## 2022-04-04 DIAGNOSIS — N20.0 RENAL STONES: ICD-10-CM

## 2022-04-04 DIAGNOSIS — Z13.31 POSITIVE DEPRESSION SCREENING: ICD-10-CM

## 2022-04-04 DIAGNOSIS — C77.9 SECONDARY ADENOCARCINOMA OF LYMPH NODE: ICD-10-CM

## 2022-04-04 DIAGNOSIS — C18.7 MALIGNANT NEOPLASM OF SIGMOID COLON: Primary | ICD-10-CM

## 2022-04-04 DIAGNOSIS — K59.1 FUNCTIONAL DIARRHEA: ICD-10-CM

## 2022-04-04 DIAGNOSIS — C18.7 MALIGNANT NEOPLASM OF SIGMOID COLON: ICD-10-CM

## 2022-04-04 DIAGNOSIS — T45.1X5A CHEMOTHERAPY-INDUCED NEUTROPENIA: ICD-10-CM

## 2022-04-04 DIAGNOSIS — E83.52 HYPERCALCEMIA: ICD-10-CM

## 2022-04-04 DIAGNOSIS — R74.01 TRANSAMINITIS: ICD-10-CM

## 2022-04-04 LAB
ALBUMIN SERPL BCP-MCNC: 3.8 G/DL (ref 3.5–5.2)
ALP SERPL-CCNC: 157 U/L (ref 55–135)
ALT SERPL W/O P-5'-P-CCNC: 68 U/L (ref 10–44)
ANION GAP SERPL CALC-SCNC: 9 MMOL/L (ref 8–16)
AST SERPL-CCNC: 40 U/L (ref 10–40)
BASOPHILS # BLD AUTO: 0.03 K/UL (ref 0–0.2)
BASOPHILS NFR BLD: 0.8 % (ref 0–1.9)
BILIRUB SERPL-MCNC: 0.4 MG/DL (ref 0.1–1)
BUN SERPL-MCNC: 13 MG/DL (ref 6–20)
CALCIUM SERPL-MCNC: 10.7 MG/DL (ref 8.7–10.5)
CHLORIDE SERPL-SCNC: 107 MMOL/L (ref 95–110)
CO2 SERPL-SCNC: 25 MMOL/L (ref 23–29)
CREAT SERPL-MCNC: 0.8 MG/DL (ref 0.5–1.4)
DIFFERENTIAL METHOD: ABNORMAL
EOSINOPHIL # BLD AUTO: 0.2 K/UL (ref 0–0.5)
EOSINOPHIL NFR BLD: 3.8 % (ref 0–8)
ERYTHROCYTE [DISTWIDTH] IN BLOOD BY AUTOMATED COUNT: 15.2 % (ref 11.5–14.5)
EST. GFR  (AFRICAN AMERICAN): >60 ML/MIN/1.73 M^2
EST. GFR  (NON AFRICAN AMERICAN): >60 ML/MIN/1.73 M^2
GLUCOSE SERPL-MCNC: 96 MG/DL (ref 70–110)
HCT VFR BLD AUTO: 42.9 % (ref 37–48.5)
HGB BLD-MCNC: 14 G/DL (ref 12–16)
IMM GRANULOCYTES # BLD AUTO: 0.01 K/UL (ref 0–0.04)
IMM GRANULOCYTES NFR BLD AUTO: 0.3 % (ref 0–0.5)
LYMPHOCYTES # BLD AUTO: 1.6 K/UL (ref 1–4.8)
LYMPHOCYTES NFR BLD: 41.4 % (ref 18–48)
MAGNESIUM SERPL-MCNC: 2.1 MG/DL (ref 1.6–2.6)
MCH RBC QN AUTO: 30.8 PG (ref 27–31)
MCHC RBC AUTO-ENTMCNC: 32.6 G/DL (ref 32–36)
MCV RBC AUTO: 94 FL (ref 82–98)
MONOCYTES # BLD AUTO: 0.4 K/UL (ref 0.3–1)
MONOCYTES NFR BLD: 10.7 % (ref 4–15)
NEUTROPHILS # BLD AUTO: 1.7 K/UL (ref 1.8–7.7)
NEUTROPHILS NFR BLD: 43 % (ref 38–73)
NRBC BLD-RTO: 0 /100 WBC
PHOSPHATE SERPL-MCNC: 2.7 MG/DL (ref 2.7–4.5)
PLATELET # BLD AUTO: 255 K/UL (ref 150–450)
PLATELET BLD QL SMEAR: ABNORMAL
PMV BLD AUTO: 10.2 FL (ref 9.2–12.9)
POTASSIUM SERPL-SCNC: 3.9 MMOL/L (ref 3.5–5.1)
PROT SERPL-MCNC: 7 G/DL (ref 6–8.4)
RBC # BLD AUTO: 4.55 M/UL (ref 4–5.4)
SODIUM SERPL-SCNC: 141 MMOL/L (ref 136–145)
WBC # BLD AUTO: 3.91 K/UL (ref 3.9–12.7)

## 2022-04-04 PROCEDURE — 84100 ASSAY OF PHOSPHORUS: CPT | Mod: PN | Performed by: INTERNAL MEDICINE

## 2022-04-04 PROCEDURE — 3008F BODY MASS INDEX DOCD: CPT | Mod: CPTII,S$GLB,, | Performed by: INTERNAL MEDICINE

## 2022-04-04 PROCEDURE — 99999 PR PBB SHADOW E&M-EST. PATIENT-LVL IV: CPT | Mod: PBBFAC,,, | Performed by: INTERNAL MEDICINE

## 2022-04-04 PROCEDURE — 3075F SYST BP GE 130 - 139MM HG: CPT | Mod: CPTII,S$GLB,, | Performed by: INTERNAL MEDICINE

## 2022-04-04 PROCEDURE — 3075F PR MOST RECENT SYSTOLIC BLOOD PRESS GE 130-139MM HG: ICD-10-PCS | Mod: CPTII,S$GLB,, | Performed by: INTERNAL MEDICINE

## 2022-04-04 PROCEDURE — 36415 COLL VENOUS BLD VENIPUNCTURE: CPT | Mod: PN | Performed by: INTERNAL MEDICINE

## 2022-04-04 PROCEDURE — 85025 COMPLETE CBC W/AUTO DIFF WBC: CPT | Mod: PN | Performed by: INTERNAL MEDICINE

## 2022-04-04 PROCEDURE — 80053 COMPREHEN METABOLIC PANEL: CPT | Mod: PN | Performed by: INTERNAL MEDICINE

## 2022-04-04 PROCEDURE — 3079F DIAST BP 80-89 MM HG: CPT | Mod: CPTII,S$GLB,, | Performed by: INTERNAL MEDICINE

## 2022-04-04 PROCEDURE — 99215 PR OFFICE/OUTPT VISIT, EST, LEVL V, 40-54 MIN: ICD-10-PCS | Mod: S$GLB,,, | Performed by: INTERNAL MEDICINE

## 2022-04-04 PROCEDURE — 3008F PR BODY MASS INDEX (BMI) DOCUMENTED: ICD-10-PCS | Mod: CPTII,S$GLB,, | Performed by: INTERNAL MEDICINE

## 2022-04-04 PROCEDURE — 83735 ASSAY OF MAGNESIUM: CPT | Mod: PN | Performed by: INTERNAL MEDICINE

## 2022-04-04 PROCEDURE — 3079F PR MOST RECENT DIASTOLIC BLOOD PRESSURE 80-89 MM HG: ICD-10-PCS | Mod: CPTII,S$GLB,, | Performed by: INTERNAL MEDICINE

## 2022-04-04 PROCEDURE — 99999 PR PBB SHADOW E&M-EST. PATIENT-LVL IV: ICD-10-PCS | Mod: PBBFAC,,, | Performed by: INTERNAL MEDICINE

## 2022-04-04 PROCEDURE — 99215 OFFICE O/P EST HI 40 MIN: CPT | Mod: S$GLB,,, | Performed by: INTERNAL MEDICINE

## 2022-04-04 RX ORDER — ATROPINE SULFATE 0.4 MG/ML
0.4 INJECTION, SOLUTION ENDOTRACHEAL; INTRAMEDULLARY; INTRAMUSCULAR; INTRAVENOUS; SUBCUTANEOUS ONCE AS NEEDED
Status: CANCELLED | OUTPATIENT
Start: 2022-04-06

## 2022-04-04 RX ORDER — HEPARIN 100 UNIT/ML
500 SYRINGE INTRAVENOUS
Status: CANCELLED | OUTPATIENT
Start: 2022-04-08

## 2022-04-04 RX ORDER — SODIUM CHLORIDE 0.9 % (FLUSH) 0.9 %
10 SYRINGE (ML) INJECTION
Status: CANCELLED | OUTPATIENT
Start: 2022-04-08

## 2022-04-04 RX ORDER — HEPARIN 100 UNIT/ML
500 SYRINGE INTRAVENOUS
Status: CANCELLED | OUTPATIENT
Start: 2022-04-06

## 2022-04-04 RX ORDER — LORAZEPAM 2 MG/ML
1 INJECTION INTRAMUSCULAR
Status: CANCELLED | OUTPATIENT
Start: 2022-04-06 | End: 2022-04-06

## 2022-04-04 RX ORDER — SODIUM CHLORIDE 0.9 % (FLUSH) 0.9 %
10 SYRINGE (ML) INJECTION
Status: CANCELLED | OUTPATIENT
Start: 2022-04-06

## 2022-04-04 NOTE — PROGRESS NOTES
PROGRESS NOTE    Subjective:       Patient ID: Gail Mckinnon is a 52 y.o. female.  MRN: [id number]  : 1970    Chief Complaint: Malignant neoplasm of sigmoid colon     History of Present Illness:   Gail Mckinnon is a 52 y.o. female who presents with  colon cancer, initially stage III and now with LN recurrence.       She completed adjuvant FOLFOX in 2020. She tolerated only 4 cycles of FOLFOX before she developed an infusion reaction to oxaliplatin in cycle 5 was well as cycle 6. She then completed 6 cycles of infusional 5 FU.     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 She is now on second line therapy with FOLFIRI and Avastin.         She presented to the ED on  with left flank pain. CT renal stone study showed Moderate hydronephrosis on the left secondary to 3 mm calculus at the UPJ.     Interim history:  She presents to discuss symptoms, labs,and further recommendations.   Reports fatigue and nausea with chemo. Diarrhea is controlled with anti diarrheal agents. Reports stable anxiety.      She reports a stable weight, appetite and performance status.        Oncology History:  Oncology History   Malignant neoplasm of sigmoid colon   3/16/2020 Initial Diagnosis    Malignant neoplasm of sigmoid colon     3/31/2020 Cancer Staged    Staging form: Colon and Rectum, AJCC 8th Edition  - Clinical stage from 3/31/2020: Stage IIIC (cT4b, cN2a, cM0)     2020 - 2020 Chemotherapy    Treatment Summary   Plan Name: OP FOLFOX 6 Q2W  Treatment Goal: Curative  Status: Inactive  Start Date: 2020  End Date: 2020  Provider: Dylan Leyva MD  Chemotherapy: fluorouraciL injection 945 mg, 400 mg/m2 = 945 mg, Intravenous, Clinic/[facility] 1 time, 14 of 14 cycles  Administration: 945 mg (2020), 945 mg (2020), 945 mg (6/3/2020), 945 mg (2020), 945 mg (2020), 945 mg (2020), 945 mg  (8/26/2020), 945 mg (9/9/2020), 945 mg (9/23/2020), 945 mg (10/6/2020), 945 mg (10/21/2020), 945 mg (11/4/2020)  fluorouraciL 2,400 mg/m2 = 5,665 mg in sodium chloride 0.9% 240 mL chemo infusion, 2,400 mg/m2 = 5,665 mg, Intravenous, Over 46 hours, 14 of 14 cycles  Administration: 5,665 mg (5/6/2020), 5,665 mg (5/20/2020), 5,665 mg (6/3/2020), 5,665 mg (6/17/2020), 5,665 mg (7/29/2020), 5,665 mg (8/12/2020), 5,665 mg (8/26/2020), 5,665 mg (9/9/2020), 5,665 mg (9/23/2020), 5,665 mg (10/6/2020), 5,665 mg (10/21/2020), 5,665 mg (11/4/2020)  leucovorin calcium 900 mg in dextrose 5 % 250 mL infusion, 945 mg, Intravenous, Clinic/[facility] 1 time, 14 of 14 cycles  Administration: 900 mg (5/6/2020), 900 mg (5/20/2020), 900 mg (6/3/2020), 900 mg (6/17/2020), 945 mg (6/30/2020), 945 mg (7/20/2020), 945 mg (7/29/2020), 945 mg (8/12/2020), 945 mg (8/26/2020), 945 mg (9/9/2020), 945 mg (9/23/2020), 945 mg (10/6/2020), 945 mg (10/21/2020), 945 mg (11/4/2020)  oxaliplatin (ELOXATIN) 200 mg in dextrose 5 % 500 mL chemo infusion, 201 mg, Intravenous, Clinic/[facility] 1 time, 6 of 6 cycles  Dose modification: 65 mg/m2 (original dose 85 mg/m2, Cycle 6)  Administration: 200 mg (5/6/2020), 200 mg (5/20/2020), 200 mg (6/3/2020), 200 mg (6/17/2020), 201 mg (6/30/2020), 150 mg (7/20/2020)     6/16/2021 -  Chemotherapy    Treatment Summary   Plan Name: OP COLORECTAL FOLFIRI + BEVACIZUMAB Q2W  Treatment Goal: Control  Status: Active  Start Date: 6/16/2021  End Date: 5/20/2022 (Planned)  Provider: Marah Santo MD  Chemotherapy: fluorouraciL injection 990 mg, 400 mg/m2 = 990 mg, Intravenous, Clinic/[facility] 1 time, 15 of 15 cycles  Administration: 990 mg (6/16/2021), 990 mg (6/30/2021), 990 mg (7/14/2021), 980 mg (7/28/2021), 990 mg (8/11/2021), 990 mg (8/25/2021), 990 mg (9/8/2021), 990 mg (9/22/2021), 990 mg (10/6/2021), 990 mg (10/20/2021), 990 mg (11/3/2021), 990 mg (12/1/2021), 990 mg (12/15/2021), 990 mg (11/17/2021), 990 mg (12/28/2021)  fluorouraciL  2,400 mg/m2 = 5,950 mg in sodium chloride 0.9% 240 mL chemo infusion, 2,400 mg/m2 = 5,950 mg, Intravenous, Over 46 hours, 21 of 24 cycles  Administration: 5,950 mg (6/16/2021), 5,950 mg (6/30/2021), 5,950 mg (7/14/2021), 5,880 mg (7/28/2021), 5,930 mg (8/11/2021), 5,930 mg (8/25/2021), 5,930 mg (9/8/2021), 5,930 mg (9/22/2021), 5,930 mg (10/6/2021), 5,930 mg (10/20/2021), 5,930 mg (11/3/2021), 5,930 mg (12/1/2021), 5,930 mg (12/15/2021), 5,930 mg (11/17/2021), 5,930 mg (12/28/2021), 6,025 mg (3/9/2022), 6,025 mg (2/23/2022), 5,930 mg (2/2/2022), 5,930 mg (1/19/2022), 6,025 mg (4/6/2022), 6,025 mg (3/23/2022)  bevacizumab (AVASTIN) 600 mg in sodium chloride 0.9% 100 mL chemo infusion, 660 mg, Intravenous, Clinic/HOD 1 time, 21 of 24 cycles  Administration: 600 mg (6/30/2021), 600 mg (7/14/2021), 600 mg (7/28/2021), 600 mg (6/16/2021), 600 mg (8/11/2021), 655 mg (8/25/2021), 600 mg (9/8/2021), 600 mg (9/22/2021), 600 mg (10/6/2021), 600 mg (10/20/2021), 600 mg (11/3/2021), 655 mg (12/1/2021), 600 mg (12/15/2021), 600 mg (11/17/2021), 600 mg (12/28/2021), 600 mg (3/9/2022), 600 mg (2/23/2022), 600 mg (2/2/2022), 600 mg (1/19/2022), 680 mg (4/6/2022), 600 mg (3/23/2022)  irinotecan (CAMPTOSAR) 440 mg in sodium chloride 0.9% 500 mL chemo infusion, 446 mg, Intravenous, Clinic/HOD 1 time, 21 of 24 cycles  Administration: 440 mg (6/16/2021), 440 mg (6/30/2021), 440 mg (7/14/2021), 440 mg (7/28/2021), 440 mg (8/11/2021), 444 mg (8/25/2021), 440 mg (9/8/2021), 440 mg (9/22/2021), 440 mg (10/6/2021), 440 mg (10/20/2021), 440 mg (11/3/2021), 440 mg (12/1/2021), 440 mg (12/15/2021), 440 mg (11/17/2021), 440 mg (12/28/2021), 440 mg (3/9/2022), 440 mg (2/23/2022), 440 mg (2/2/2022), 440 mg (1/19/2022), 440 mg (4/6/2022), 440 mg (3/24/2022)  leucovorin calcium 400 mg/m2 = 990 mg in dextrose 5 % 250 mL infusion, 400 mg/m2 = 990 mg, Intravenous, Clinic/HOD 1 time, 21 of 24 cycles  Administration: 990 mg (6/16/2021), 990 mg  (6/30/2021), 990 mg (7/14/2021), 980 mg (7/28/2021), 990 mg (8/11/2021), 990 mg (8/25/2021), 990 mg (9/8/2021), 990 mg (9/22/2021), 990 mg (10/6/2021), 990 mg (10/20/2021), 990 mg (11/3/2021), 990 mg (12/1/2021), 990 mg (12/15/2021), 990 mg (11/17/2021), 990 mg (12/28/2021), 1,000 mg (3/9/2022), 1,000 mg (2/23/2022), 990 mg (2/2/2022), 990 mg (1/19/2022), 1,000 mg (4/6/2022), 1,000 mg (3/24/2022)     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   4/3/2020 Initial Diagnosis    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     5/6/2020 - 11/17/2020 Chemotherapy    Treatment Summary   Plan Name: OP FOLFOX 6 Q2W  Treatment Goal: Curative  Status: Inactive  Start Date: 5/6/2020  End Date: 11/6/2020  Provider: Dylan Leyva MD  Chemotherapy: fluorouraciL injection 945 mg, 400 mg/m2 = 945 mg, Intravenous, Clinic/[facility] 1 time, 14 of 14 cycles  Administration: 945 mg (5/6/2020), 945 mg (5/20/2020), 945 mg (6/3/2020), 945 mg (6/17/2020), 945 mg (7/29/2020), 945 mg (8/12/2020), 945 mg (8/26/2020), 945 mg (9/9/2020), 945 mg (9/23/2020), 945 mg (10/6/2020), 945 mg (10/21/2020), 945 mg (11/4/2020)  fluorouraciL 2,400 mg/m2 = 5,665 mg in sodium chloride 0.9% 240 mL chemo infusion, 2,400 mg/m2 = 5,665 mg, Intravenous, Over 46 hours, 14 of 14 cycles  Administration: 5,665 mg (5/6/2020), 5,665 mg (5/20/2020), 5,665 mg (6/3/2020), 5,665 mg (6/17/2020), 5,665 mg (7/29/2020), 5,665 mg (8/12/2020), 5,665 mg (8/26/2020), 5,665 mg (9/9/2020), 5,665 mg (9/23/2020), 5,665 mg (10/6/2020), 5,665 mg (10/21/2020), 5,665 mg (11/4/2020)  leucovorin calcium 900 mg in dextrose 5 % 250 mL infusion, 945 mg, Intravenous, Clinic/[facility] 1 time, 14 of 14 cycles  Administration: 900 mg (5/6/2020), 900 mg (5/20/2020), 900 mg (6/3/2020), 900 mg (6/17/2020), 945 mg (6/30/2020), 945 mg (7/20/2020), 945 mg (7/29/2020), 945 mg (8/12/2020), 945 mg (8/26/2020), 945 mg (9/9/2020), 945 mg (9/23/2020), 945 mg (10/6/2020), 945 mg (10/21/2020), 945 mg (11/4/2020)  oxaliplatin (ELOXATIN)  200 mg in dextrose 5 % 500 mL chemo infusion, 201 mg, Intravenous, Clinic/[facility] 1 time, 6 of 6 cycles  Dose modification: 65 mg/m2 (original dose 85 mg/m2, Cycle 6)  Administration: 200 mg (5/6/2020), 200 mg (5/20/2020), 200 mg (6/3/2020), 200 mg (6/17/2020), 201 mg (6/30/2020), 150 mg (7/20/2020)     6/16/2021 -  Chemotherapy    Treatment Summary   Plan Name: OP COLORECTAL FOLFIRI + BEVACIZUMAB Q2W  Treatment Goal: Control  Status: Active  Start Date: 6/16/2021  End Date: 5/20/2022 (Planned)  Provider: Marah Santo MD  Chemotherapy: fluorouraciL injection 990 mg, 400 mg/m2 = 990 mg, Intravenous, Clinic/[facility] 1 time, 15 of 15 cycles  Administration: 990 mg (6/16/2021), 990 mg (6/30/2021), 990 mg (7/14/2021), 980 mg (7/28/2021), 990 mg (8/11/2021), 990 mg (8/25/2021), 990 mg (9/8/2021), 990 mg (9/22/2021), 990 mg (10/6/2021), 990 mg (10/20/2021), 990 mg (11/3/2021), 990 mg (12/1/2021), 990 mg (12/15/2021), 990 mg (11/17/2021), 990 mg (12/28/2021)  fluorouraciL 2,400 mg/m2 = 5,950 mg in sodium chloride 0.9% 240 mL chemo infusion, 2,400 mg/m2 = 5,950 mg, Intravenous, Over 46 hours, 21 of 24 cycles  Administration: 5,950 mg (6/16/2021), 5,950 mg (6/30/2021), 5,950 mg (7/14/2021), 5,880 mg (7/28/2021), 5,930 mg (8/11/2021), 5,930 mg (8/25/2021), 5,930 mg (9/8/2021), 5,930 mg (9/22/2021), 5,930 mg (10/6/2021), 5,930 mg (10/20/2021), 5,930 mg (11/3/2021), 5,930 mg (12/1/2021), 5,930 mg (12/15/2021), 5,930 mg (11/17/2021), 5,930 mg (12/28/2021), 6,025 mg (3/9/2022), 6,025 mg (2/23/2022), 5,930 mg (2/2/2022), 5,930 mg (1/19/2022), 6,025 mg (4/6/2022), 6,025 mg (3/23/2022)  bevacizumab (AVASTIN) 600 mg in sodium chloride 0.9% 100 mL chemo infusion, 660 mg, Intravenous, Clinic/[facility] 1 time, 21 of 24 cycles  Administration: 600 mg (6/30/2021), 600 mg (7/14/2021), 600 mg (7/28/2021), 600 mg (6/16/2021), 600 mg (8/11/2021), 655 mg (8/25/2021), 600 mg (9/8/2021), 600 mg (9/22/2021), 600 mg (10/6/2021), 600 mg (10/20/2021), 600 mg  (11/3/2021), 655 mg (2021), 600 mg (12/15/2021), 600 mg (2021), 600 mg (2021), 600 mg (3/9/2022), 600 mg (2022), 600 mg (2022), 600 mg (2022), 680 mg (2022), 600 mg (3/23/2022)  irinotecan (CAMPTOSAR) 440 mg in sodium chloride 0.9% 500 mL chemo infusion, 446 mg, Intravenous, Clinic/[facility] 1 time, 21 of 24 cycles  Administration: 440 mg (2021), 440 mg (2021), 440 mg (2021), 440 mg (2021), 440 mg (2021), 444 mg (2021), 440 mg (2021), 440 mg (2021), 440 mg (10/6/2021), 440 mg (10/20/2021), 440 mg (11/3/2021), 440 mg (2021), 440 mg (12/15/2021), 440 mg (2021), 440 mg (2021), 440 mg (3/9/2022), 440 mg (2022), 440 mg (2022), 440 mg (2022), 440 mg (2022), 440 mg (3/24/2022)  leucovorin calcium 400 mg/m2 = 990 mg in dextrose 5 % 250 mL infusion, 400 mg/m2 = 990 mg, Intravenous, Clinic/HOD 1 time, 21 of 24 cycles  Administration: 990 mg (2021), 990 mg (2021), 990 mg (2021), 980 mg (2021), 990 mg (2021), 990 mg (2021), 990 mg (2021), 990 mg (2021), 990 mg (10/6/2021), 990 mg (10/20/2021), 990 mg (11/3/2021), 990 mg (2021), 990 mg (12/15/2021), 990 mg (2021), 990 mg (2021), 1,000 mg (3/9/2022), 1,000 mg (2022), 990 mg (2022), 990 mg (2022), 1,000 mg (2022), 1,000 mg (3/24/2022)         History:  Past Medical History:   Diagnosis Date    Anxiety     Depression     FH: ovarian cancer 3/16/2020    Hx of psychiatric care     Effexor, Paxil, Lexapro, Zoloft, Wellbutrin, Trazodone Buspar    Hyperthyroidism     Hypothyroid     Kidney calculi     Malignant neoplasm of sigmoid colon 3/16/2020    Menorrhagia     Multinodular goiter 2012    Palpitation     Psychiatric problem     Venous insufficiency       Past Surgical History:   Procedure Laterality Date     SECTION, CLASSIC      x3    COLONOSCOPY N/A 2020    Procedure:  COLONOSCOPY;  Surgeon: Shane Parker MD;  [facility];  Service: Endoscopy;  Laterality: N/A;    CYSTOSCOPY W/ URETERAL STENT PLACEMENT Bilateral 3/25/2020    Procedure: CYSTOSCOPY, WITH URETERAL STENT INSERTION;  Surgeon: Claudio Tyson MD;  Location: [facility] OR [address] [place] [address];  Service: Urology;  Laterality: Bilateral;    INSERTION OF TUNNELED CENTRAL VENOUS CATHETER (CVC) WITH SUBCUTANEOUS PORT N/A 5/1/2020    Procedure: [id number];  Surgeon: [facility] Diagnostic Provider;  [facility];  Service: Radiology;  Laterality: N/A;  Room 189/[facility]    LAPAROSCOPIC SIGMOIDECTOMY N/A 3/25/2020    Procedure: COLECTOMY, SIGMOID, LAPAROSCOPIC, flex sig, ERAS high;  Surgeon: Silvio Man MD;  Location: [address];  Service: Colon and Rectal;  Laterality: N/A;    MOBILIZATION OF SPLENIC FLEXURE  3/25/2020    Procedure: MOBILIZATION, SPLENIC FLEXURE;  Surgeon: Silvio Man MD;  [facility];  Service: Colon and Rectal;;    tonsillectomy      TOTAL ABDOMINAL HYSTERECTOMY W/ BILATERAL SALPINGOOPHORECTOMY N/A 3/25/2020    Procedure: HYSTERECTOMY, TOTAL, ABDOMINAL, WITH BILATERAL SALPINGO-OOPHORECTOMY;  Surgeon: Keron Brady MD;  Location: [facility] OR [address] [place] [address];  Service: Oncology;  Laterality: N/A;     Family History   Problem Relation Age of Onset    Heart disease Father     Diabetes Mother 65    Drug abuse Brother     Drug abuse Brother     Cancer Maternal Aunt         lung cancer    Cancer Maternal Grandmother         stomach cancer- started in ovaries    Ovarian cancer Maternal Grandmother         stomach cancer- started in ovaries    Colon cancer Paternal Uncle     Cancer Paternal Uncle     Ovarian cancer Maternal Aunt     Ovarian cancer Maternal Aunt     Ovarian cancer Cousin         mother had ovarian cancer    Drug abuse Son     Drug abuse Son         clean/sober since 2012    Colon cancer Son     No Known Problems Daughter     Uterine  "cancer Neg Hx     Breast cancer Neg Hx      Social History     Tobacco Use    Smoking status: Never Smoker    Smokeless tobacco: Never Used   Substance and Sexual Activity    Alcohol use: Yes     Comment: occasionally- twice monthly    Drug use: Never    Sexual activity: Yes     Partners: Male     Birth control/protection: See Surgical Hx        ROS:   Review of Systems   Constitutional: Negative for fever, malaise/fatigue and weight loss.   HENT: Negative for congestion, hearing loss, nosebleeds and sore throat.    Eyes: Negative for double vision and photophobia.   Respiratory: Negative for cough, hemoptysis, sputum production, shortness of breath and wheezing.    Cardiovascular: Negative for chest pain, palpitations, orthopnea and leg swelling.   Gastrointestinal: Positive for nausea. Negative for abdominal pain, blood in stool, constipation, diarrhea, heartburn and vomiting.   Genitourinary: Negative for dysuria, frequency, hematuria and urgency.   Musculoskeletal: Negative for back pain, joint pain and myalgias.   Skin: Negative for itching and rash.   Neurological: Positive for headaches. Negative for dizziness, tingling, seizures and weakness.   Endo/Heme/Allergies: Negative for polydipsia. Does not bruise/bleed easily.   Psychiatric/Behavioral: Positive for depression. Negative for memory loss. The patient is nervous/anxious and has insomnia.         Objective:     Vitals:    04/04/22 1448   BP: 135/81   Pulse: 75   Resp: 18   Temp: 96.9 °F (36.1 °C)   TempSrc: Temporal   SpO2: 98%   Weight: 135.7 kg (299 lb 2.6 oz)   Height: 5' 6" (1.676 m)   PainSc: 0-No pain     Wt Readings from Last 10 Encounters:   04/06/22 135.7 kg (299 lb 2.6 oz)   04/04/22 135.7 kg (299 lb 2.6 oz)   03/24/22 134.1 kg (295 lb 10.2 oz)   03/23/22 134.1 kg (295 lb 10.2 oz)   03/21/22 134.1 kg (295 lb 10.2 oz)   03/09/22 135 kg (297 lb 9.9 oz)   03/07/22 135 kg (297 lb 9.9 oz)   02/23/22 135.5 kg (298 lb 11.6 oz)   02/21/22 135.5 " kg (298 lb 11.6 oz)   02/02/22 132.2 kg (291 lb 7.2 oz)       Physical Examination:   Physical Exam  Vitals and nursing note reviewed.   Constitutional:       General: She is not in acute distress.     Appearance: She is not diaphoretic.   HENT:      Head: Normocephalic.      Mouth/Throat:      Pharynx: No oropharyngeal exudate.   Eyes:      General: No scleral icterus.     Conjunctiva/sclera: Conjunctivae normal.   Neck:      Thyroid: No thyromegaly.   Cardiovascular:      Rate and Rhythm: Normal rate and regular rhythm.      Heart sounds: Normal heart sounds. No murmur heard.  Pulmonary:      Effort: Pulmonary effort is normal. No respiratory distress.      Breath sounds: No stridor. No wheezing or rales.   Chest:      Chest wall: No tenderness.   Abdominal:      General: Bowel sounds are normal. There is no distension.      Palpations: Abdomen is soft. There is no mass.      Tenderness: There is no abdominal tenderness. There is no rebound.   Musculoskeletal:         General: No tenderness or deformity. Normal range of motion.      Cervical back: Neck supple.   Lymphadenopathy:      Cervical: No cervical adenopathy.   Skin:     General: Skin is warm and dry.      Findings: No erythema or rash.   Neurological:      Mental Status: She is alert and oriented to person, place, and time.      Cranial Nerves: No cranial nerve deficit.      Coordination: Coordination normal.      Gait: Gait is intact.   Psychiatric:         Mood and Affect: Affect normal.         Cognition and Memory: Memory normal.         Judgment: Judgment normal.          Diagnostic Tests:  Significant Imaging: I have reviewed and interpreted all pertinent imaging results/findings.        Laboratory Data:  All pertinent labs have been reviewed.  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 Oxaliplatin was stopped due to severe adverse reaction.     Follow-up CTs and PET in May 2021 showed enlarging retroperitoneal node, concerning for metastatic disease.      She is tolerating FOLFIRI + Avastin well. Most recent PET scan showed complete resolution of previously noted hypermetabolic nodes and no other sites of disease.      Her case has been discussed at the colorectal tumor board ands she is not deemed a surgical candidate. We discussed maintenance 5 FU and Avastin vs full dose chemo as tolerated. Will continue FOLFIRI and Avastin at this time. Reconsider maintenance dose after follow up PET scan.     Chemotherapy-induced neutropenia  5 FU bolus has been dropped. Continue to monitor.     Hypercalcemia  Renal stones   Secondary to mild primary hyperparathyroidism.  Continue endocrinology and urology follow-up, adequate hydration.  Avoid calcium supplementation.     Hypophosphatemia  Improved with K-Phos.  Continue at this time.    Anxiety  Continue supportive care, is on Wellbutrin. She will follow up with Dr. Palm.     Functional diarrhea  Continue imodium as needed.     Transaminitis  Intermittent. Continue to monitor.     Positive depression screening  Comments:  I have reviewed the positive depression score with the patient and found that no additional intervention is needed at this time.    Positive depression screening  Comments:  I have  reviewed the positive depression score which warrants active treatment with psychotherapy and/or medications.  Depression score positive.     I have used clinical judgement based on duration and functional status to consider definite necessity for treatment. She will continue to follow up with Dr. Palm. She declined a psychiatry referral at this time.      ECOG SCORE    0 - Fully active-able to carry on all pre-disease performance without restriction           Discussion:   Follow up in about 2 weeks (around 4/18/2022) for MD, Lab Results, Chemo.    Plan was discussed with the patient at length, and she verbalized understanding. Gail was given an opportunity to ask questions that were answered to her satisfaction, and she was advised to call in the interval if any problems or questions arise.    Electronically signed by Marah Santo MD          Answers for HPI/ROS submitted by the patient on 4/4/2022  appetite change : No  unexpected weight change: No  mouth sores: Yes  visual disturbance: No  adenopathy: No

## 2022-04-06 ENCOUNTER — DOCUMENTATION ONLY (OUTPATIENT)
Dept: INFUSION THERAPY | Facility: HOSPITAL | Age: 52
End: 2022-04-06
Payer: COMMERCIAL

## 2022-04-06 ENCOUNTER — INFUSION (OUTPATIENT)
Dept: INFUSION THERAPY | Facility: HOSPITAL | Age: 52
End: 2022-04-06
Attending: INTERNAL MEDICINE
Payer: COMMERCIAL

## 2022-04-06 VITALS
HEIGHT: 66 IN | BODY MASS INDEX: 47.09 KG/M2 | TEMPERATURE: 98 F | HEART RATE: 80 BPM | DIASTOLIC BLOOD PRESSURE: 82 MMHG | RESPIRATION RATE: 14 BRPM | WEIGHT: 293 LBS | SYSTOLIC BLOOD PRESSURE: 127 MMHG

## 2022-04-06 DIAGNOSIS — C18.7 MALIGNANT NEOPLASM OF SIGMOID COLON: ICD-10-CM

## 2022-04-06 DIAGNOSIS — C77.2 METASTASIS TO INTESTINAL LYMPH NODE: Primary | ICD-10-CM

## 2022-04-06 PROCEDURE — 63600175 PHARM REV CODE 636 W HCPCS: Mod: PN | Performed by: INTERNAL MEDICINE

## 2022-04-06 PROCEDURE — 96415 CHEMO IV INFUSION ADDL HR: CPT | Mod: PN

## 2022-04-06 PROCEDURE — 96417 CHEMO IV INFUS EACH ADDL SEQ: CPT | Mod: PN

## 2022-04-06 PROCEDURE — 25000003 PHARM REV CODE 250: Mod: PN | Performed by: INTERNAL MEDICINE

## 2022-04-06 PROCEDURE — 96368 THER/DIAG CONCURRENT INF: CPT | Mod: PN

## 2022-04-06 PROCEDURE — 96413 CHEMO IV INFUSION 1 HR: CPT | Mod: PN

## 2022-04-06 PROCEDURE — 96367 TX/PROPH/DG ADDL SEQ IV INF: CPT | Mod: PN

## 2022-04-06 PROCEDURE — 96416 CHEMO PROLONG INFUSE W/PUMP: CPT | Mod: PN

## 2022-04-06 PROCEDURE — 96375 TX/PRO/DX INJ NEW DRUG ADDON: CPT | Mod: PN

## 2022-04-06 RX ORDER — LORAZEPAM 2 MG/ML
1 INJECTION INTRAMUSCULAR
Status: COMPLETED | OUTPATIENT
Start: 2022-04-06 | End: 2022-04-06

## 2022-04-06 RX ORDER — ATROPINE SULFATE 0.4 MG/ML
0.4 INJECTION, SOLUTION ENDOTRACHEAL; INTRAMEDULLARY; INTRAMUSCULAR; INTRAVENOUS; SUBCUTANEOUS ONCE AS NEEDED
Status: COMPLETED | OUTPATIENT
Start: 2022-04-06 | End: 2022-04-06

## 2022-04-06 RX ORDER — SODIUM CHLORIDE 0.9 % (FLUSH) 0.9 %
10 SYRINGE (ML) INJECTION
Status: DISCONTINUED | OUTPATIENT
Start: 2022-04-06 | End: 2022-04-06 | Stop reason: HOSPADM

## 2022-04-06 RX ORDER — HEPARIN 100 UNIT/ML
500 SYRINGE INTRAVENOUS
Status: DISCONTINUED | OUTPATIENT
Start: 2022-04-06 | End: 2022-04-06 | Stop reason: HOSPADM

## 2022-04-06 RX ADMIN — SODIUM CHLORIDE 440 MG: 0.9 INJECTION, SOLUTION INTRAVENOUS at 03:04

## 2022-04-06 RX ADMIN — PALONOSETRON 0.25 MG: 0.05 INJECTION, SOLUTION INTRAVENOUS at 02:04

## 2022-04-06 RX ADMIN — PROMETHAZINE HYDROCHLORIDE 6.25 MG: 25 INJECTION INTRAMUSCULAR; INTRAVENOUS at 02:04

## 2022-04-06 RX ADMIN — SODIUM CHLORIDE: 9 INJECTION, SOLUTION INTRAVENOUS at 12:04

## 2022-04-06 RX ADMIN — ATROPINE SULFATE 0.4 MG: 0.4 INJECTION, SOLUTION INTRAMUSCULAR; INTRAVENOUS; SUBCUTANEOUS at 03:04

## 2022-04-06 RX ADMIN — FLUOROURACIL 6025 MG: 50 INJECTION, SOLUTION INTRAVENOUS at 05:04

## 2022-04-06 RX ADMIN — LEUCOVORIN CALCIUM 1000 MG: 350 INJECTION, POWDER, LYOPHILIZED, FOR SOLUTION INTRAMUSCULAR; INTRAVENOUS at 03:04

## 2022-04-06 RX ADMIN — BEVACIZUMAB 680 MG: 400 INJECTION, SOLUTION INTRAVENOUS at 01:04

## 2022-04-06 RX ADMIN — LORAZEPAM 1 MG: 2 INJECTION INTRAMUSCULAR; INTRAVENOUS at 03:04

## 2022-04-06 NOTE — PROGRESS NOTES
Oncology Nutrition       Weight Check   Chart reviewed. Weight check completed on pt. RD spoke with patient briefly while she was getting infusion and states she is doing well.     Wt Readings from Last 10 Encounters:   04/06/22 135.7 kg (299 lb 2.6 oz)   04/04/22 135.7 kg (299 lb 2.6 oz)   03/24/22 134.1 kg (295 lb 10.2 oz)   03/23/22 134.1 kg (295 lb 10.2 oz)   03/21/22 134.1 kg (295 lb 10.2 oz)   03/09/22 135 kg (297 lb 9.9 oz)   03/07/22 135 kg (297 lb 9.9 oz)   02/23/22 135.5 kg (298 lb 11.6 oz)   02/21/22 135.5 kg (298 lb 11.6 oz)   02/02/22 132.2 kg (291 lb 7.2 oz)      RD plan of care: Weight is currently 299#. No significant change at this time. Per nursing nutrition risk report, pt denies any nutritional concerns or challenges at this time. RD to continue to monitor and follow throughout tx.    Raquel Bullock, MS, RD, LDN  04/06/2022  3:40 PM  [facility]

## 2022-04-06 NOTE — PLAN OF CARE
Problem: Adult Inpatient Plan of Care  Goal: Plan of Care Review  Outcome: Ongoing, Progressing  Flowsheets (Taken 4/6/2022 1748)  Plan of Care Reviewed With: patient  Goal: Patient-Specific Goal (Individualized)  Outcome: Ongoing, Progressing  Flowsheets (Taken 4/6/2022 1748)  Anxieties, Fears or Concerns: PET scan coming up  Individualized Care Needs: recliner, blanket, lights dimmed, temp warmer in room, priv room, snacks  Patient-Specific Goals (Include Timeframe): no s/s of rx during tx     Problem: Fatigue  Goal: Improved Activity Tolerance  Outcome: Ongoing, Progressing  Intervention: Promote Improved Energy  Flowsheets (Taken 4/6/2022 1748)  Activity Management:   Ambulated in reyes - L4   Ambulated to bathroom - L4   Up in chair - L3   Pt tolerated Avastin/Folfiri well. No adverse reaction noted. PAC remains accessed with 5FU infusing at 5.2cc/hr over 46 hours. Patent upon discharge.

## 2022-04-07 ENCOUNTER — PATIENT OUTREACH (OUTPATIENT)
Dept: ADMINISTRATIVE | Facility: HOSPITAL | Age: 52
End: 2022-04-07
Payer: COMMERCIAL

## 2022-04-07 DIAGNOSIS — C18.7 MALIGNANT NEOPLASM OF SIGMOID COLON: ICD-10-CM

## 2022-04-08 ENCOUNTER — INFUSION (OUTPATIENT)
Dept: INFUSION THERAPY | Facility: HOSPITAL | Age: 52
End: 2022-04-08
Attending: INTERNAL MEDICINE
Payer: COMMERCIAL

## 2022-04-08 VITALS
WEIGHT: 293 LBS | TEMPERATURE: 97 F | BODY MASS INDEX: 47.09 KG/M2 | OXYGEN SATURATION: 98 % | HEART RATE: 79 BPM | DIASTOLIC BLOOD PRESSURE: 76 MMHG | HEIGHT: 66 IN | RESPIRATION RATE: 18 BRPM | SYSTOLIC BLOOD PRESSURE: 129 MMHG

## 2022-04-08 DIAGNOSIS — C77.2 METASTASIS TO INTESTINAL LYMPH NODE: Primary | ICD-10-CM

## 2022-04-08 DIAGNOSIS — C18.7 MALIGNANT NEOPLASM OF SIGMOID COLON: ICD-10-CM

## 2022-04-08 PROCEDURE — 63600175 PHARM REV CODE 636 W HCPCS: Mod: PN | Performed by: INTERNAL MEDICINE

## 2022-04-08 PROCEDURE — A4216 STERILE WATER/SALINE, 10 ML: HCPCS | Mod: PN | Performed by: INTERNAL MEDICINE

## 2022-04-08 PROCEDURE — 25000003 PHARM REV CODE 250: Mod: PN | Performed by: INTERNAL MEDICINE

## 2022-04-08 RX ORDER — LIDOCAINE AND PRILOCAINE 25; 25 MG/G; MG/G
CREAM TOPICAL
Qty: 30 G | Refills: 3 | Status: SHIPPED | OUTPATIENT
Start: 2022-04-08 | End: 2022-10-31 | Stop reason: SDUPTHER

## 2022-04-08 RX ORDER — SODIUM CHLORIDE 0.9 % (FLUSH) 0.9 %
10 SYRINGE (ML) INJECTION
Status: DISCONTINUED | OUTPATIENT
Start: 2022-04-08 | End: 2022-04-08 | Stop reason: HOSPADM

## 2022-04-08 RX ORDER — LEVOTHYROXINE SODIUM 200 UG/1
200 TABLET ORAL DAILY
Qty: 90 TABLET | Refills: 3 | Status: SHIPPED | OUTPATIENT
Start: 2022-04-08 | End: 2023-05-16 | Stop reason: SDUPTHER

## 2022-04-08 RX ORDER — HEPARIN 100 UNIT/ML
500 SYRINGE INTRAVENOUS
Status: DISCONTINUED | OUTPATIENT
Start: 2022-04-08 | End: 2022-04-08 | Stop reason: HOSPADM

## 2022-04-08 RX ADMIN — Medication 10 ML: at 02:04

## 2022-04-08 RX ADMIN — Medication 500 UNITS: at 02:04

## 2022-04-08 NOTE — PLAN OF CARE
.Pt came in for pump D/C. Pt tolerated home 5FU well and had no complaints. PAC flushed w/ NS and heparin and deaccessed. Pt education reinforced and explained what to expect in the next couple of days. Pt verbalized understanding.

## 2022-04-10 ENCOUNTER — PATIENT MESSAGE (OUTPATIENT)
Dept: HEMATOLOGY/ONCOLOGY | Facility: CLINIC | Age: 52
End: 2022-04-10
Payer: COMMERCIAL

## 2022-04-11 ENCOUNTER — PATIENT MESSAGE (OUTPATIENT)
Dept: PSYCHIATRY | Facility: CLINIC | Age: 52
End: 2022-04-11
Payer: COMMERCIAL

## 2022-04-18 ENCOUNTER — HOSPITAL ENCOUNTER (OUTPATIENT)
Dept: RADIOLOGY | Facility: HOSPITAL | Age: 52
Discharge: HOME | End: 2022-04-18
Attending: INTERNAL MEDICINE
Payer: COMMERCIAL

## 2022-04-18 DIAGNOSIS — C18.7 MALIGNANT NEOPLASM OF SIGMOID COLON: ICD-10-CM

## 2022-04-18 LAB — GLUCOSE SERPL-MCNC: 107 MG/DL (ref 70–110)

## 2022-04-18 PROCEDURE — A9552 F18 FDG: HCPCS | Mod: PN

## 2022-04-18 PROCEDURE — 78815 PET IMAGE W/CT SKULL-THIGH: CPT | Mod: 26,PS,, | Performed by: RADIOLOGY

## 2022-04-18 PROCEDURE — 78815 NM PET CT ROUTINE: ICD-10-PCS | Mod: 26,PS,, | Performed by: RADIOLOGY

## 2022-04-18 NOTE — PROGRESS NOTES
PET Imaging Questionnaire    1. Are you a Diabetic? Recent Blood Sugar level? No    2. Are you anemic? Bone Marrow Stimulation Meds? No    3. Have you had a CT Scan, if so when & where was your last one? Yes -     4. Have you had a PET Scan, if so when & where was your last one? Yes -     5. Chemotherapy or currently on Chemotherapy? Yes    6. Radiation therapy? No    Surgical History:   Past Surgical History:   Procedure Laterality Date     SECTION, CLASSIC      x3    COLONOSCOPY N/A 2020    Procedure: COLONOSCOPY;  Surgeon: Shane Parker MD;  Location: [facility] ENDO;  Service: Endoscopy;  Laterality: N/A;    CYSTOSCOPY W/ URETERAL STENT PLACEMENT Bilateral 3/25/2020    Procedure: CYSTOSCOPY, WITH URETERAL STENT INSERTION;  Surgeon: Claudio Tyson MD;  Location: [facility] OR [address] [place] [address];  Service: Urology;  Laterality: Bilateral;    INSERTION OF TUNNELED CENTRAL VENOUS CATHETER (CVC) WITH SUBCUTANEOUS PORT N/A 2020    Procedure: [id number];  Surgeon: [facility] Diagnostic Provider;  [facility];  Service: Radiology;  Laterality: N/A;  Room 189/[facility]    LAPAROSCOPIC SIGMOIDECTOMY N/A 3/25/2020    Procedure: COLECTOMY, SIGMOID, LAPAROSCOPIC, flex sig, ERAS high;  Surgeon: Silvio Man MD;  [facility];  Service: Colon and Rectal;  Laterality: N/A;    MOBILIZATION OF SPLENIC FLEXURE  3/25/2020    Procedure: MOBILIZATION, SPLENIC FLEXURE;  Surgeon: Silvio Man MD;  Location: [facility] OR [facility] FLR;  Service: Colon and Rectal;;    tonsillectomy      TOTAL ABDOMINAL HYSTERECTOMY W/ BILATERAL SALPINGOOPHORECTOMY N/A 3/25/2020    Procedure: HYSTERECTOMY, TOTAL, ABDOMINAL, WITH BILATERAL SALPINGO-OOPHORECTOMY;  Surgeon: Keron Brady MD;  Location: [facility] OR [facility] FLR;  Service: Oncology;  Laterality: N/A;   7.      8. Have you been fasting for at least 6 hours? Yes    9. Is there any chance you may be pregnant or breastfeeding? No    Assay: 12.33 MCi@:10:25    Injection Site:RT AC    Residual: 1.014 mCi@: 10:27   Technologist: Flaquito Box Injected:11.32mCi

## 2022-04-20 ENCOUNTER — PATIENT MESSAGE (OUTPATIENT)
Dept: HEMATOLOGY/ONCOLOGY | Facility: CLINIC | Age: 52
End: 2022-04-20

## 2022-04-20 ENCOUNTER — OFFICE VISIT (OUTPATIENT)
Dept: HEMATOLOGY/ONCOLOGY | Facility: CLINIC | Age: 52
End: 2022-04-20
Payer: COMMERCIAL

## 2022-04-20 ENCOUNTER — INFUSION (OUTPATIENT)
Dept: INFUSION THERAPY | Facility: HOSPITAL | Age: 52
End: 2022-04-20
Attending: INTERNAL MEDICINE
Payer: COMMERCIAL

## 2022-04-20 ENCOUNTER — DOCUMENTATION ONLY (OUTPATIENT)
Dept: INFUSION THERAPY | Facility: HOSPITAL | Age: 52
End: 2022-04-20
Payer: COMMERCIAL

## 2022-04-20 ENCOUNTER — OFFICE VISIT (OUTPATIENT)
Dept: PSYCHIATRY | Facility: CLINIC | Age: 52
End: 2022-04-20
Payer: COMMERCIAL

## 2022-04-20 VITALS
HEIGHT: 66 IN | TEMPERATURE: 97 F | WEIGHT: 293 LBS | RESPIRATION RATE: 18 BRPM | BODY MASS INDEX: 47.09 KG/M2 | HEART RATE: 88 BPM | DIASTOLIC BLOOD PRESSURE: 77 MMHG | SYSTOLIC BLOOD PRESSURE: 167 MMHG

## 2022-04-20 VITALS
RESPIRATION RATE: 18 BRPM | SYSTOLIC BLOOD PRESSURE: 128 MMHG | HEIGHT: 66 IN | BODY MASS INDEX: 47.09 KG/M2 | WEIGHT: 293 LBS | TEMPERATURE: 97 F | HEART RATE: 69 BPM | DIASTOLIC BLOOD PRESSURE: 84 MMHG | OXYGEN SATURATION: 99 %

## 2022-04-20 DIAGNOSIS — C77.9 SECONDARY ADENOCARCINOMA OF LYMPH NODE: ICD-10-CM

## 2022-04-20 DIAGNOSIS — C80.1 ANXIETY ASSOCIATED WITH CANCER DIAGNOSIS: ICD-10-CM

## 2022-04-20 DIAGNOSIS — C77.2 METASTASIS TO INTESTINAL LYMPH NODE: ICD-10-CM

## 2022-04-20 DIAGNOSIS — F41.1 ANXIETY ASSOCIATED WITH CANCER DIAGNOSIS: ICD-10-CM

## 2022-04-20 DIAGNOSIS — C18.7 MALIGNANT NEOPLASM OF SIGMOID COLON: ICD-10-CM

## 2022-04-20 DIAGNOSIS — C18.7 MALIGNANT NEOPLASM OF SIGMOID COLON: Primary | ICD-10-CM

## 2022-04-20 DIAGNOSIS — C77.2 METASTASIS TO INTESTINAL LYMPH NODE: Primary | ICD-10-CM

## 2022-04-20 DIAGNOSIS — F33.1 MODERATE EPISODE OF RECURRENT MAJOR DEPRESSIVE DISORDER: Primary | ICD-10-CM

## 2022-04-20 PROCEDURE — 3074F SYST BP LT 130 MM HG: CPT | Mod: CPTII,S$GLB,, | Performed by: STUDENT IN AN ORGANIZED HEALTH CARE EDUCATION/TRAINING PROGRAM

## 2022-04-20 PROCEDURE — 99999 PR PBB SHADOW E&M-EST. PATIENT-LVL I: CPT | Mod: PBBFAC,,, | Performed by: PSYCHOLOGIST

## 2022-04-20 PROCEDURE — 96375 TX/PRO/DX INJ NEW DRUG ADDON: CPT | Mod: PN

## 2022-04-20 PROCEDURE — 90834 PR PSYCHOTHERAPY W/PATIENT, 45 MIN: ICD-10-PCS | Mod: S$GLB,,, | Performed by: PSYCHOLOGIST

## 2022-04-20 PROCEDURE — A4216 STERILE WATER/SALINE, 10 ML: HCPCS | Mod: PN | Performed by: STUDENT IN AN ORGANIZED HEALTH CARE EDUCATION/TRAINING PROGRAM

## 2022-04-20 PROCEDURE — 96416 CHEMO PROLONG INFUSE W/PUMP: CPT | Mod: PN

## 2022-04-20 PROCEDURE — 1159F MED LIST DOCD IN RCRD: CPT | Mod: CPTII,S$GLB,, | Performed by: STUDENT IN AN ORGANIZED HEALTH CARE EDUCATION/TRAINING PROGRAM

## 2022-04-20 PROCEDURE — 90834 PSYTX W PT 45 MINUTES: CPT | Mod: S$GLB,,, | Performed by: PSYCHOLOGIST

## 2022-04-20 PROCEDURE — 25000003 PHARM REV CODE 250: Mod: PN | Performed by: STUDENT IN AN ORGANIZED HEALTH CARE EDUCATION/TRAINING PROGRAM

## 2022-04-20 PROCEDURE — 99215 OFFICE O/P EST HI 40 MIN: CPT | Mod: S$GLB,,, | Performed by: STUDENT IN AN ORGANIZED HEALTH CARE EDUCATION/TRAINING PROGRAM

## 2022-04-20 PROCEDURE — 3079F PR MOST RECENT DIASTOLIC BLOOD PRESSURE 80-89 MM HG: ICD-10-PCS | Mod: CPTII,S$GLB,, | Performed by: STUDENT IN AN ORGANIZED HEALTH CARE EDUCATION/TRAINING PROGRAM

## 2022-04-20 PROCEDURE — 96368 THER/DIAG CONCURRENT INF: CPT | Mod: PN

## 2022-04-20 PROCEDURE — 96367 TX/PROPH/DG ADDL SEQ IV INF: CPT | Mod: PN

## 2022-04-20 PROCEDURE — 96417 CHEMO IV INFUS EACH ADDL SEQ: CPT | Mod: PN

## 2022-04-20 PROCEDURE — 99999 PR PBB SHADOW E&M-EST. PATIENT-LVL V: ICD-10-PCS | Mod: PBBFAC,,, | Performed by: STUDENT IN AN ORGANIZED HEALTH CARE EDUCATION/TRAINING PROGRAM

## 2022-04-20 PROCEDURE — 99215 PR OFFICE/OUTPT VISIT, EST, LEVL V, 40-54 MIN: ICD-10-PCS | Mod: S$GLB,,, | Performed by: STUDENT IN AN ORGANIZED HEALTH CARE EDUCATION/TRAINING PROGRAM

## 2022-04-20 PROCEDURE — 63600175 PHARM REV CODE 636 W HCPCS: Mod: PN | Performed by: STUDENT IN AN ORGANIZED HEALTH CARE EDUCATION/TRAINING PROGRAM

## 2022-04-20 PROCEDURE — 3074F PR MOST RECENT SYSTOLIC BLOOD PRESSURE < 130 MM HG: ICD-10-PCS | Mod: CPTII,S$GLB,, | Performed by: STUDENT IN AN ORGANIZED HEALTH CARE EDUCATION/TRAINING PROGRAM

## 2022-04-20 PROCEDURE — 1159F PR MEDICATION LIST DOCUMENTED IN MEDICAL RECORD: ICD-10-PCS | Mod: CPTII,S$GLB,, | Performed by: STUDENT IN AN ORGANIZED HEALTH CARE EDUCATION/TRAINING PROGRAM

## 2022-04-20 PROCEDURE — 3008F BODY MASS INDEX DOCD: CPT | Mod: CPTII,S$GLB,, | Performed by: STUDENT IN AN ORGANIZED HEALTH CARE EDUCATION/TRAINING PROGRAM

## 2022-04-20 PROCEDURE — 1160F PR REVIEW ALL MEDS BY PRESCRIBER/CLIN PHARMACIST DOCUMENTED: ICD-10-PCS | Mod: CPTII,S$GLB,, | Performed by: STUDENT IN AN ORGANIZED HEALTH CARE EDUCATION/TRAINING PROGRAM

## 2022-04-20 PROCEDURE — 96413 CHEMO IV INFUSION 1 HR: CPT | Mod: PN

## 2022-04-20 PROCEDURE — 99999 PR PBB SHADOW E&M-EST. PATIENT-LVL V: CPT | Mod: PBBFAC,,, | Performed by: STUDENT IN AN ORGANIZED HEALTH CARE EDUCATION/TRAINING PROGRAM

## 2022-04-20 PROCEDURE — 1160F RVW MEDS BY RX/DR IN RCRD: CPT | Mod: CPTII,S$GLB,, | Performed by: STUDENT IN AN ORGANIZED HEALTH CARE EDUCATION/TRAINING PROGRAM

## 2022-04-20 PROCEDURE — 3008F PR BODY MASS INDEX (BMI) DOCUMENTED: ICD-10-PCS | Mod: CPTII,S$GLB,, | Performed by: STUDENT IN AN ORGANIZED HEALTH CARE EDUCATION/TRAINING PROGRAM

## 2022-04-20 PROCEDURE — 96415 CHEMO IV INFUSION ADDL HR: CPT | Mod: PN

## 2022-04-20 PROCEDURE — 99999 PR PBB SHADOW E&M-EST. PATIENT-LVL I: ICD-10-PCS | Mod: PBBFAC,,, | Performed by: PSYCHOLOGIST

## 2022-04-20 PROCEDURE — 3079F DIAST BP 80-89 MM HG: CPT | Mod: CPTII,S$GLB,, | Performed by: STUDENT IN AN ORGANIZED HEALTH CARE EDUCATION/TRAINING PROGRAM

## 2022-04-20 RX ORDER — HEPARIN 100 UNIT/ML
500 SYRINGE INTRAVENOUS
Status: CANCELLED | OUTPATIENT
Start: 2022-04-20

## 2022-04-20 RX ORDER — SODIUM CHLORIDE 0.9 % (FLUSH) 0.9 %
10 SYRINGE (ML) INJECTION
Status: CANCELLED | OUTPATIENT
Start: 2022-04-22

## 2022-04-20 RX ORDER — LORAZEPAM 2 MG/ML
1 INJECTION INTRAMUSCULAR
Status: CANCELLED | OUTPATIENT
Start: 2022-04-20 | End: 2022-04-20

## 2022-04-20 RX ORDER — ATROPINE SULFATE 0.4 MG/ML
0.4 INJECTION, SOLUTION ENDOTRACHEAL; INTRAMEDULLARY; INTRAMUSCULAR; INTRAVENOUS; SUBCUTANEOUS ONCE AS NEEDED
Status: COMPLETED | OUTPATIENT
Start: 2022-04-20 | End: 2022-04-20

## 2022-04-20 RX ORDER — LORAZEPAM 2 MG/ML
1 INJECTION INTRAMUSCULAR
Status: COMPLETED | OUTPATIENT
Start: 2022-04-20 | End: 2022-04-20

## 2022-04-20 RX ORDER — SODIUM CHLORIDE 0.9 % (FLUSH) 0.9 %
10 SYRINGE (ML) INJECTION
Status: DISCONTINUED | OUTPATIENT
Start: 2022-04-20 | End: 2022-04-20 | Stop reason: HOSPADM

## 2022-04-20 RX ORDER — ONDANSETRON 8 MG/1
TABLET, ORALLY DISINTEGRATING ORAL
COMMUNITY
Start: 2022-04-08 | End: 2022-06-15 | Stop reason: SDUPTHER

## 2022-04-20 RX ORDER — ATROPINE SULFATE 0.4 MG/ML
0.4 INJECTION, SOLUTION ENDOTRACHEAL; INTRAMEDULLARY; INTRAMUSCULAR; INTRAVENOUS; SUBCUTANEOUS ONCE AS NEEDED
Status: CANCELLED | OUTPATIENT
Start: 2022-04-20

## 2022-04-20 RX ORDER — HEPARIN 100 UNIT/ML
500 SYRINGE INTRAVENOUS
Status: CANCELLED | OUTPATIENT
Start: 2022-04-22

## 2022-04-20 RX ORDER — SODIUM CHLORIDE 0.9 % (FLUSH) 0.9 %
10 SYRINGE (ML) INJECTION
Status: CANCELLED | OUTPATIENT
Start: 2022-04-20

## 2022-04-20 RX ADMIN — LEUCOVORIN CALCIUM 1000 MG: 350 INJECTION, POWDER, LYOPHILIZED, FOR SOLUTION INTRAMUSCULAR; INTRAVENOUS at 02:04

## 2022-04-20 RX ADMIN — SODIUM CHLORIDE: 0.9 INJECTION, SOLUTION INTRAVENOUS at 12:04

## 2022-04-20 RX ADMIN — FLUOROURACIL 6050 MG: 50 INJECTION, SOLUTION INTRAVENOUS at 03:04

## 2022-04-20 RX ADMIN — BEVACIZUMAB 600 MG: 400 INJECTION, SOLUTION INTRAVENOUS at 01:04

## 2022-04-20 RX ADMIN — PROMETHAZINE HYDROCHLORIDE 6.25 MG: 25 INJECTION INTRAMUSCULAR; INTRAVENOUS at 12:04

## 2022-04-20 RX ADMIN — Medication 10 ML: at 03:04

## 2022-04-20 RX ADMIN — LORAZEPAM 1 MG: 2 INJECTION INTRAMUSCULAR; INTRAVENOUS at 12:04

## 2022-04-20 RX ADMIN — PALONOSETRON: 0.05 INJECTION, SOLUTION INTRAVENOUS at 12:04

## 2022-04-20 RX ADMIN — ATROPINE SULFATE 0.4 MG: 0.4 INJECTION, SOLUTION INTRAMUSCULAR; INTRAVENOUS; SUBCUTANEOUS at 12:04

## 2022-04-20 RX ADMIN — SODIUM CHLORIDE 440 MG: 0.9 INJECTION, SOLUTION INTRAVENOUS at 02:04

## 2022-04-20 NOTE — PROGRESS NOTES
Oncology   Chemotherapy Infusion Visit    Quick Social Service Status Follow Up   Met w/ pt briefly to follow up on social and emotional needs since initiation of treatment.      SW followed up with pt at chairside. She was sad today and informed SW learned her scans were not good. She was not able to see her primary oncologist Dr. Santo today. She wants to talk with her and see how they will proceed. Pt is discouraged after being on treatment for almost a year. She is thinking about getting a second opinion from [facility] but is undecided. SW listened to pt and encouraged her to guardado down the reason she would like to go and or not go. Encouraged her to discuss this with Dr Santo as well. SW provided emotional support.     Today pt inquired into resources.  It is the first time she has inquired into possible assistance. She is her sole provider and starting to worry about finances. She said her main area of concern would be her rent and gas  Cost if decided to go to [facility] for a second opinion. SW and pt discussed resources and SW to complete a support enrolment form and application to [facility] for gas cards and rental assistance if available. SW will also explore avaiable grants for colon cancer pt.     SW to follow.        Radha Giordano, MEDINA  04/20/2022  3:26 PM

## 2022-04-20 NOTE — PROGRESS NOTES
PSYCHO-ONCOLOGY NOTE/ Individual Psychotherapy     Date: 4/20/2022   Site:  [facility]      Therapeutic Intervention: Met with patient.  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    Anxiety    Insomnia    Insomnia    Anxiety    Other constipation    Nausea    Generalized anxiety disorder with panic attacks    Chemotherapy adverse reaction    Mucositis due to chemotherapy    Morbid obesity    Secondary adenocarcinoma of lymph node    Thyroid nodule    Hypercalcemia    Transaminitis    Dysuria    Chemotherapy-induced neutropenia    Hypophosphatemia    Functional diarrhea       Chief complaint/reason for encounter: adjustment to illness, depression and anxiety      Met with patient to evaluate psychosocial adaptation to diagnosis/treatment of metastatic colon cancer    Current Medications  Current Outpatient Medications   Medication    acetaminophen (TYLENOL) 500 MG tablet    buPROPion (WELLBUTRIN SR) 150 MG TBSR 12 hr tablet    busPIRone (BUSPAR) 10 MG tablet    cyclobenzaprine (FLEXERIL) 10 MG tablet    duke's soln (benadryl 30 mL, mylanta 30 mL, LIDOcaine 30 mL, nystatin 30 mL) 120mL    Lactobacillus rhamnosus GG (CULTURELLE) 10 billion cell capsule    lactulose (CHRONULAC) 10 gram/15 mL solution    levothyroxine (SYNTHROID) 200 MCG tablet    LIDOcaine-prilocaine (EMLA) cream    multivitamin (THERAGRAN) per tablet    ondansetron (ZOFRAN-ODT) 8 MG TbDL    potassium phosphate, monobasic, (K-PHOS) 500 mg TbSO     promethazine (PHENERGAN) 12.5 MG Tab    tamsulosin (FLOMAX) 0.4 mg Cap    traMADoL (ULTRAM) 50 mg tablet    traZODone (DESYREL) 50 MG tablet     No current facility-administered medications for this visit.     Facility-Administered Medications Ordered in Other Visits   Medication Frequency    fluorouraciL 2,400 mg/m2 = 6,050 mg in sodium chloride 0.9% 240 mL chemo infusion over 46 hr    irinotecan (CAMPTOSAR) 440 mg in sodium chloride 0.9% 500 mL chemo infusion 1 time in Clinic/HOD    leucovorin calcium 1,000 mg in dextrose 5 % 250 mL infusion 1 time in Clinic/HOD    sodium chloride 0.9% 250 mL flush bag 1 time in Clinic/HOD    sodium chloride 0.9% flush 10 mL PRN       Objective:  Gail Mckinnon arrived promptly for the session. The patient was fully cooperative throughout the session, though initially distressed regarding recent imaging.  Appearance: age appropriate, professionally  dressed, well groomed  Behavior/Cooperation: friendly and cooperative  Speech: normal in rate, volume, and tone and appropriate quality, quantity and organization of sentences  Mood: depressed  Affect: anxious and mood congruent  Thought Process: goal-directed, logical  Thought Content: normal,  No delusions or paranoia; did not appear to be responding to internal stimuli during the session  Orientation: grossly intact  Memory: grossly intact  Attention Span/Concentration: Attends to session without distraction; reports no difficulty  Fund of Knowledge: average  Estimate of Intelligence: average from verbal skills and history  Cognition: grossly intact  Insight: patient has awareness of illness; good insight into own behavior and behavior of others  Judgment: the patient's behavior is adequate to circumstances    Interval history and content of current session: Discussed/processed imaging results and sense of limited tx options at length. Applied cognitive behavioral therapy to address negative cognitions (Pt was able to  lead provider through aspects of exercise displaying improvements in ability to actively cope). Provided additional psychotherapeutic support, processing potential options moving forward including a second opinion from [facility] at length. Reviewed overlap in tx SE and depressive sxs criteria-encouraging her to actively cope regardless. Identified and evaluated psychosocial and environmental stressors secondary to diagnosis and treatment.  Examined proactive behaviors that may be implemented to minimize or ameliorate psychosocial stressors secondary to treatment, encouraging integration of care seeking and value based behaviors if possible.       Risk parameters:   Patient reports no suicidal ideation  Patient reports no homicidal ideation  Patient reports no self-injurious behavior  Patient reports no violent behavior   Safety needs:  None at this time      Verbal deficits: None     Patient's response to intervention:The patient's response to intervention is accepting.     Progress toward goals and other mental status changes:  The patient's progress toward goals is good.      Progress to date:Progress as Expected-in spite of difficult imaging news      Goals from last visit: Attempted, partially met        Patient Strengths: verbal, motivated, intelligent, successful, good social support, good insight, commitment to wellness, strong cultural traditions        Treatment Plan:individual psychotherapy  ? Target symptoms: depression, anxiety, adjustment  ? Why chosen therapy is appropriate versus another modality: relevant to diagnosis, patient responds to this modality, evidence based practice  ? Outcome monitoring methods: self-report, observation, tx team feedback  ? Therapeutic intervention type: insight oriented psychotherapy, supportive psychotherapy  ? Prognosis: Good                            Behavioral goals:               Exercise: continued/increased as per physician recommendations              Stress  management: appropriate boundaries and accepting aid when needed              Social engagement: continued and increased especially w/ grandchildren/family, as per CDC COVID-19 guidelines              Therapy: adaptive coping, re-engage in thought logging/cognitive restructuring, grounding exercises    Return to clinic: 2 weeks     Length of Service (minutes direct face-to-face contact): 45    Diagnosis:     ICD-10-CM ICD-9-CM   1. Moderate episode of recurrent major depressive disorder  F33.1 296.32   2. Anxiety associated with cancer diagnosis  F41.1 300.09    C80.1                 Marky Palm Psy.D.  [place] License #[id number]  [place] License #[id number]

## 2022-04-20 NOTE — PROGRESS NOTES
PROGRESS NOTE    Subjective:       Patient ID: Gail Mckinnon is a 52 y.o. female.  MRN: [id number]  : 1970    Chief Complaint: Malignant neoplasm of sigmoid colon     History of Present Illness:   Gail Mckinnon is a 52 y.o. female who presents with  colon cancer, initially stage III and now with LN recurrence.       She completed adjuvant FOLFOX in 2020. She tolerated only 4 cycles of FOLFOX before she developed an infusion reaction to oxaliplatin in cycle 5 was well as cycle 6. She then completed 6 cycles of infusional 5 FU.     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 She is now on second line therapy with FOLFIRI and Avastin.      She presented to the ED on  with left flank pain. CT renal stone study showed Moderate hydronephrosis on the left secondary to 3 mm calculus at the UPJ.     Interim history:  She presents to discuss symptoms, labs,and further recommendations.   Reports fatigue and nausea with chemo. Diarrhea is controlled with anti diarrheal agents. Reports stable anxiety.     Since her last clinic visit with Dr Santo, PET/CT showed possible progression of her disease from 1.7cm to 1.9cm, no new lesion. Discussed with patient the like mejia of not being progression. Previously patient was found to be BRAF mutated. Will proceed with chemo today and discuss further with radiologist        Oncology History:  Oncology History   Malignant neoplasm of sigmoid colon   3/16/2020 Initial Diagnosis    Malignant neoplasm of sigmoid colon     3/31/2020 Cancer Staged    Staging form: Colon and Rectum, AJCC 8th Edition  - Clinical stage from 3/31/2020: Stage IIIC (cT4b, cN2a, cM0)     2020 - 2020 Chemotherapy    Treatment Summary   Plan Name: OP FOLFOX 6 Q2W  Treatment Goal: Curative  Status: Inactive  Start Date: 2020  End Date: 2020  Provider: Dylan Leyva MD  Chemotherapy:  fluorouraciL injection 945 mg, 400 mg/m2 = 945 mg, Intravenous, Clinic/[facility] 1 time, 14 of 14 cycles  Administration: 945 mg (5/6/2020), 945 mg (5/20/2020), 945 mg (6/3/2020), 945 mg (6/17/2020), 945 mg (7/29/2020), 945 mg (8/12/2020), 945 mg (8/26/2020), 945 mg (9/9/2020), 945 mg (9/23/2020), 945 mg (10/6/2020), 945 mg (10/21/2020), 945 mg (11/4/2020)  fluorouraciL 2,400 mg/m2 = 5,665 mg in sodium chloride 0.9% 240 mL chemo infusion, 2,400 mg/m2 = 5,665 mg, Intravenous, Over 46 hours, 14 of 14 cycles  Administration: 5,665 mg (5/6/2020), 5,665 mg (5/20/2020), 5,665 mg (6/3/2020), 5,665 mg (6/17/2020), 5,665 mg (7/29/2020), 5,665 mg (8/12/2020), 5,665 mg (8/26/2020), 5,665 mg (9/9/2020), 5,665 mg (9/23/2020), 5,665 mg (10/6/2020), 5,665 mg (10/21/2020), 5,665 mg (11/4/2020)  leucovorin calcium 900 mg in dextrose 5 % 250 mL infusion, 945 mg, Intravenous, Clinic/[facility] 1 time, 14 of 14 cycles  Administration: 900 mg (5/6/2020), 900 mg (5/20/2020), 900 mg (6/3/2020), 900 mg (6/17/2020), 945 mg (6/30/2020), 945 mg (7/20/2020), 945 mg (7/29/2020), 945 mg (8/12/2020), 945 mg (8/26/2020), 945 mg (9/9/2020), 945 mg (9/23/2020), 945 mg (10/6/2020), 945 mg (10/21/2020), 945 mg (11/4/2020)  oxaliplatin (ELOXATIN) 200 mg in dextrose 5 % 500 mL chemo infusion, 201 mg, Intravenous, Clinic/[facility] 1 time, 6 of 6 cycles  Dose modification: 65 mg/m2 (original dose 85 mg/m2, Cycle 6)  Administration: 200 mg (5/6/2020), 200 mg (5/20/2020), 200 mg (6/3/2020), 200 mg (6/17/2020), 201 mg (6/30/2020), 150 mg (7/20/2020)     6/16/2021 -  Chemotherapy    Treatment Summary   Plan Name: OP COLORECTAL FOLFIRI + BEVACIZUMAB Q2W  Treatment Goal: Control  Status: Active  Start Date: 6/16/2021  End Date: 5/20/2022 (Planned)  Provider: Marah Santo MD  Chemotherapy: fluorouraciL injection 990 mg, 400 mg/m2 = 990 mg, Intravenous, Clinic/[facility] 1 time, 15 of 15 cycles  Administration: 990 mg (6/16/2021), 990 mg (6/30/2021), 990 mg (7/14/2021), 980 mg  (7/28/2021), 990 mg (8/11/2021), 990 mg (8/25/2021), 990 mg (9/8/2021), 990 mg (9/22/2021), 990 mg (10/6/2021), 990 mg (10/20/2021), 990 mg (11/3/2021), 990 mg (12/1/2021), 990 mg (12/15/2021), 990 mg (11/17/2021), 990 mg (12/28/2021)  fluorouraciL 2,400 mg/m2 = 5,950 mg in sodium chloride 0.9% 240 mL chemo infusion, 2,400 mg/m2 = 5,950 mg, Intravenous, Over 46 hours, 21 of 24 cycles  Administration: 5,950 mg (6/16/2021), 5,950 mg (6/30/2021), 5,950 mg (7/14/2021), 5,880 mg (7/28/2021), 5,930 mg (8/11/2021), 5,930 mg (8/25/2021), 5,930 mg (9/8/2021), 5,930 mg (9/22/2021), 5,930 mg (10/6/2021), 5,930 mg (10/20/2021), 5,930 mg (11/3/2021), 5,930 mg (12/1/2021), 5,930 mg (12/15/2021), 5,930 mg (11/17/2021), 5,930 mg (12/28/2021), 6,025 mg (3/9/2022), 6,025 mg (2/23/2022), 5,930 mg (2/2/2022), 5,930 mg (1/19/2022), 6,025 mg (4/6/2022), 6,025 mg (3/23/2022)  bevacizumab (AVASTIN) 600 mg in sodium chloride 0.9% 100 mL chemo infusion, 660 mg, Intravenous, Clinic/[facility]OD 1 time, 21 of 24 cycles  Administration: 600 mg (6/30/2021), 600 mg (7/14/2021), 600 mg (7/28/2021), 600 mg (6/16/2021), 600 mg (8/11/2021), 655 mg (8/25/2021), 600 mg (9/8/2021), 600 mg (9/22/2021), 600 mg (10/6/2021), 600 mg (10/20/2021), 600 mg (11/3/2021), 655 mg (12/1/2021), 600 mg (12/15/2021), 600 mg (11/17/2021), 600 mg (12/28/2021), 600 mg (3/9/2022), 600 mg (2/23/2022), 600 mg (2/2/2022), 600 mg (1/19/2022), 680 mg (4/6/2022), 600 mg (3/23/2022)  irinotecan (CAMPTOSAR) 440 mg in sodium chloride 0.9% 500 mL chemo infusion, 446 mg, Intravenous, Clinic/HOD 1 time, 21 of 24 cycles  Administration: 440 mg (6/16/2021), 440 mg (6/30/2021), 440 mg (7/14/2021), 440 mg (7/28/2021), 440 mg (8/11/2021), 444 mg (8/25/2021), 440 mg (9/8/2021), 440 mg (9/22/2021), 440 mg (10/6/2021), 440 mg (10/20/2021), 440 mg (11/3/2021), 440 mg (12/1/2021), 440 mg (12/15/2021), 440 mg (11/17/2021), 440 mg (12/28/2021), 440 mg (3/9/2022), 440 mg (2/23/2022), 440 mg (2/2/2022), 440  mg (1/19/2022), 440 mg (4/6/2022), 440 mg (3/24/2022)  leucovorin calcium 400 mg/m2 = 990 mg in dextrose 5 % 250 mL infusion, 400 mg/m2 = 990 mg, Intravenous, Clinic/HOD 1 time, 21 of 24 cycles  Administration: 990 mg (6/16/2021), 990 mg (6/30/2021), 990 mg (7/14/2021), 980 mg (7/28/2021), 990 mg (8/11/2021), 990 mg (8/25/2021), 990 mg (9/8/2021), 990 mg (9/22/2021), 990 mg (10/6/2021), 990 mg (10/20/2021), 990 mg (11/3/2021), 990 mg (12/1/2021), 990 mg (12/15/2021), 990 mg (11/17/2021), 990 mg (12/28/2021), 1,000 mg (3/9/2022), 1,000 mg (2/23/2022), 990 mg (2/2/2022), 990 mg (1/19/2022), 1,000 mg (4/6/2022), 1,000 mg (3/24/2022)     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   4/3/2020 Initial Diagnosis    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     5/6/2020 - 11/17/2020 Chemotherapy    Treatment Summary   Plan Name: OP FOLFOX 6 Q2W  Treatment Goal: Curative  Status: Inactive  Start Date: 5/6/2020  End Date: 11/6/2020  Provider: Dylan Leyva MD  Chemotherapy: fluorouraciL injection 945 mg, 400 mg/m2 = 945 mg, Intravenous, Clinic/[facility] 1 time, 14 of 14 cycles  Administration: 945 mg (5/6/2020), 945 mg (5/20/2020), 945 mg (6/3/2020), 945 mg (6/17/2020), 945 mg (7/29/2020), 945 mg (8/12/2020), 945 mg (8/26/2020), 945 mg (9/9/2020), 945 mg (9/23/2020), 945 mg (10/6/2020), 945 mg (10/21/2020), 945 mg (11/4/2020)  fluorouraciL 2,400 mg/m2 = 5,665 mg in sodium chloride 0.9% 240 mL chemo infusion, 2,400 mg/m2 = 5,665 mg, Intravenous, Over 46 hours, 14 of 14 cycles  Administration: 5,665 mg (5/6/2020), 5,665 mg (5/20/2020), 5,665 mg (6/3/2020), 5,665 mg (6/17/2020), 5,665 mg (7/29/2020), 5,665 mg (8/12/2020), 5,665 mg (8/26/2020), 5,665 mg (9/9/2020), 5,665 mg (9/23/2020), 5,665 mg (10/6/2020), 5,665 mg (10/21/2020), 5,665 mg (11/4/2020)  leucovorin calcium 900 mg in dextrose 5 % 250 mL infusion, 945 mg, Intravenous, Clinic/[facility] 1 time, 14 of 14 cycles  Administration: 900 mg (5/6/2020), 900 mg (5/20/2020), 900 mg (6/3/2020), 900 mg  (6/17/2020), 945 mg (6/30/2020), 945 mg (7/20/2020), 945 mg (7/29/2020), 945 mg (8/12/2020), 945 mg (8/26/2020), 945 mg (9/9/2020), 945 mg (9/23/2020), 945 mg (10/6/2020), 945 mg (10/21/2020), 945 mg (11/4/2020)  oxaliplatin (ELOXATIN) 200 mg in dextrose 5 % 500 mL chemo infusion, 201 mg, Intravenous, Clinic/HOD 1 time, 6 of 6 cycles  Dose modification: 65 mg/m2 (original dose 85 mg/m2, Cycle 6)  Administration: 200 mg (5/6/2020), 200 mg (5/20/2020), 200 mg (6/3/2020), 200 mg (6/17/2020), 201 mg (6/30/2020), 150 mg (7/20/2020)     6/16/2021 -  Chemotherapy    Treatment Summary   Plan Name: OP COLORECTAL FOLFIRI + BEVACIZUMAB Q2W  Treatment Goal: Control  Status: Active  Start Date: 6/16/2021  End Date: 5/20/2022 (Planned)  Provider: Marah Santo MD  Chemotherapy: fluorouraciL injection 990 mg, 400 mg/m2 = 990 mg, Intravenous, Clinic/HOD 1 time, 15 of 15 cycles  Administration: 990 mg (6/16/2021), 990 mg (6/30/2021), 990 mg (7/14/2021), 980 mg (7/28/2021), 990 mg (8/11/2021), 990 mg (8/25/2021), 990 mg (9/8/2021), 990 mg (9/22/2021), 990 mg (10/6/2021), 990 mg (10/20/2021), 990 mg (11/3/2021), 990 mg (12/1/2021), 990 mg (12/15/2021), 990 mg (11/17/2021), 990 mg (12/28/2021)  fluorouraciL 2,400 mg/m2 = 5,950 mg in sodium chloride 0.9% 240 mL chemo infusion, 2,400 mg/m2 = 5,950 mg, Intravenous, Over 46 hours, 21 of 24 cycles  Administration: 5,950 mg (6/16/2021), 5,950 mg (6/30/2021), 5,950 mg (7/14/2021), 5,880 mg (7/28/2021), 5,930 mg (8/11/2021), 5,930 mg (8/25/2021), 5,930 mg (9/8/2021), 5,930 mg (9/22/2021), 5,930 mg (10/6/2021), 5,930 mg (10/20/2021), 5,930 mg (11/3/2021), 5,930 mg (12/1/2021), 5,930 mg (12/15/2021), 5,930 mg (11/17/2021), 5,930 mg (12/28/2021), 6,025 mg (3/9/2022), 6,025 mg (2/23/2022), 5,930 mg (2/2/2022), 5,930 mg (1/19/2022), 6,025 mg (4/6/2022), 6,025 mg (3/23/2022)  bevacizumab (AVASTIN) 600 mg in sodium chloride 0.9% 100 mL chemo infusion, 660 mg, Intravenous, Clinic/[facility] 1 time, 21 of  24 cycles  Administration: 600 mg (6/30/2021), 600 mg (7/14/2021), 600 mg (7/28/2021), 600 mg (6/16/2021), 600 mg (8/11/2021), 655 mg (8/25/2021), 600 mg (9/8/2021), 600 mg (9/22/2021), 600 mg (10/6/2021), 600 mg (10/20/2021), 600 mg (11/3/2021), 655 mg (12/1/2021), 600 mg (12/15/2021), 600 mg (11/17/2021), 600 mg (12/28/2021), 600 mg (3/9/2022), 600 mg (2/23/2022), 600 mg (2/2/2022), 600 mg (1/19/2022), 680 mg (4/6/2022), 600 mg (3/23/2022)  irinotecan (CAMPTOSAR) 440 mg in sodium chloride 0.9% 500 mL chemo infusion, 446 mg, Intravenous, Clinic/[facility] 1 time, 21 of 24 cycles  Administration: 440 mg (6/16/2021), 440 mg (6/30/2021), 440 mg (7/14/2021), 440 mg (7/28/2021), 440 mg (8/11/2021), 444 mg (8/25/2021), 440 mg (9/8/2021), 440 mg (9/22/2021), 440 mg (10/6/2021), 440 mg (10/20/2021), 440 mg (11/3/2021), 440 mg (12/1/2021), 440 mg (12/15/2021), 440 mg (11/17/2021), 440 mg (12/28/2021), 440 mg (3/9/2022), 440 mg (2/23/2022), 440 mg (2/2/2022), 440 mg (1/19/2022), 440 mg (4/6/2022), 440 mg (3/24/2022)  leucovorin calcium 400 mg/m2 = 990 mg in dextrose 5 % 250 mL infusion, 400 mg/m2 = 990 mg, Intravenous, [facility] 1 time, 21 of 24 cycles  Administration: 990 mg (6/16/2021), 990 mg (6/30/2021), 990 mg (7/14/2021), 980 mg (7/28/2021), 990 mg (8/11/2021), 990 mg (8/25/2021), 990 mg (9/8/2021), 990 mg (9/22/2021), 990 mg (10/6/2021), 990 mg (10/20/2021), 990 mg (11/3/2021), 990 mg (12/1/2021), 990 mg (12/15/2021), 990 mg (11/17/2021), 990 mg (12/28/2021), 1,000 mg (3/9/2022), 1,000 mg (2/23/2022), 990 mg (2/2/2022), 990 mg (1/19/2022), 1,000 mg (4/6/2022), 1,000 mg (3/24/2022)         History:  Past Medical History:   Diagnosis Date    Anxiety     Depression     FH: ovarian cancer 3/16/2020    Hx of psychiatric care     Effexor, Paxil, Lexapro, Zoloft, Wellbutrin, Trazodone Buspar    Hyperthyroidism     Hypothyroid     Kidney calculi     Malignant neoplasm of sigmoid colon 3/16/2020    Menorrhagia      Multinodular goiter 2012    Palpitation     Psychiatric problem     Venous insufficiency       Past Surgical History:   Procedure Laterality Date     SECTION, CLASSIC      x3    COLONOSCOPY N/A 2020    Procedure: COLONOSCOPY;  Surgeon: Shane Parker MD;  [facility];  Service: Endoscopy;  Laterality: N/A;    CYSTOSCOPY W/ URETERAL STENT PLACEMENT Bilateral 3/25/2020    Procedure: CYSTOSCOPY, WITH URETERAL STENT INSERTION;  Surgeon: Claudio Tyson MD;  Location: [facility] OR [address] [place] [address];  Service: Urology;  Laterality: Bilateral;    INSERTION OF TUNNELED CENTRAL VENOUS CATHETER (CVC) WITH SUBCUTANEOUS PORT N/A 2020    Procedure: [id number];  Surgeon: [facility] Diagnostic Provider;  [facility];  Service: Radiology;  Laterality: N/A;  Room 189/[facility]    LAPAROSCOPIC SIGMOIDECTOMY N/A 3/25/2020    Procedure: COLECTOMY, SIGMOID, LAPAROSCOPIC, flex sig, ERAS high;  Surgeon: Silvio Man MD;  [facility];  Service: Colon and Rectal;  Laterality: N/A;    MOBILIZATION OF SPLENIC FLEXURE  3/25/2020    Procedure: MOBILIZATION, SPLENIC FLEXURE;  Surgeon: Silvio Man MD;  [facility];  Service: Colon and Rectal;;    tonsillectomy      TOTAL ABDOMINAL HYSTERECTOMY W/ BILATERAL SALPINGOOPHORECTOMY N/A 3/25/2020    Procedure: HYSTERECTOMY, TOTAL, ABDOMINAL, WITH BILATERAL SALPINGO-OOPHORECTOMY;  Surgeon: Keron Brady MD;  [facility];  Service: Oncology;  Laterality: N/A;     Family History   Problem Relation Age of Onset    Heart disease Father     Diabetes Mother 65    Drug abuse Brother     Drug abuse Brother     Cancer Maternal Aunt         lung cancer    Cancer Maternal Grandmother         stomach cancer- started in ovaries    Ovarian cancer Maternal Grandmother         stomach cancer- started in ovaries    Colon cancer Paternal Uncle     Cancer Paternal Uncle     Ovarian cancer Maternal Aunt   "   Ovarian cancer Maternal Aunt     Ovarian cancer Cousin         mother had ovarian cancer    Drug abuse Son     Drug abuse Son         clean/sober since 2012    Colon cancer Son     No Known Problems Daughter     Uterine cancer Neg Hx     Breast cancer Neg Hx      Social History     Tobacco Use    Smoking status: Never Smoker    Smokeless tobacco: Never Used   Substance and Sexual Activity    Alcohol use: Yes     Comment: occasionally- twice monthly    Drug use: Never    Sexual activity: Yes     Partners: Male     Birth control/protection: See Surgical Hx        ROS:   Review of Systems   Constitutional: Negative for fever, malaise/fatigue and weight loss.   HENT: Negative for congestion, hearing loss, nosebleeds and sore throat.    Eyes: Negative for double vision and photophobia.   Respiratory: Negative for cough, hemoptysis, sputum production, shortness of breath and wheezing.    Cardiovascular: Negative for chest pain, palpitations, orthopnea and leg swelling.   Gastrointestinal: Positive for nausea. Negative for abdominal pain, blood in stool, constipation, diarrhea, heartburn and vomiting.   Genitourinary: Negative for dysuria, frequency, hematuria and urgency.   Musculoskeletal: Negative for back pain, joint pain and myalgias.   Skin: Negative for itching and rash.   Neurological: Positive for headaches. Negative for dizziness, tingling, seizures and weakness.   Endo/Heme/Allergies: Negative for polydipsia. Does not bruise/bleed easily.   Psychiatric/Behavioral: Positive for depression. Negative for memory loss. The patient is nervous/anxious and has insomnia.         Objective:     Vitals:    04/20/22 1103   BP: 128/84   Pulse: 69   Resp: 18   Temp: 97.1 °F (36.2 °C)   TempSrc: Temporal   SpO2: 99%   Weight: (!) 136.2 kg (300 lb 4.3 oz)   Height: 5' 6" (1.676 m)   PainSc: 0-No pain     Wt Readings from Last 10 Encounters:   04/20/22 (!) 136.2 kg (300 lb 4.3 oz)   04/08/22 135.7 kg (299 lb 2.6 " oz)   04/06/22 135.7 kg (299 lb 2.6 oz)   04/04/22 135.7 kg (299 lb 2.6 oz)   03/24/22 134.1 kg (295 lb 10.2 oz)   03/23/22 134.1 kg (295 lb 10.2 oz)   03/21/22 134.1 kg (295 lb 10.2 oz)   03/09/22 135 kg (297 lb 9.9 oz)   03/07/22 135 kg (297 lb 9.9 oz)   02/23/22 135.5 kg (298 lb 11.6 oz)       Physical Examination:   Physical Exam  Vitals and nursing note reviewed.   Constitutional:       General: She is not in acute distress.     Appearance: She is not diaphoretic.   HENT:      Head: Normocephalic.      Mouth/Throat:      Pharynx: No oropharyngeal exudate.   Eyes:      General: No scleral icterus.     Conjunctiva/sclera: Conjunctivae normal.   Neck:      Thyroid: No thyromegaly.   Cardiovascular:      Rate and Rhythm: Normal rate and regular rhythm.      Heart sounds: Normal heart sounds. No murmur heard.  Pulmonary:      Effort: Pulmonary effort is normal. No respiratory distress.      Breath sounds: No stridor. No wheezing or rales.   Chest:      Chest wall: No tenderness.   Abdominal:      General: Bowel sounds are normal. There is no distension.      Palpations: Abdomen is soft. There is no mass.      Tenderness: There is no abdominal tenderness. There is no rebound.   Musculoskeletal:         General: No tenderness or deformity. Normal range of motion.      Cervical back: Neck supple.   Lymphadenopathy:      Cervical: No cervical adenopathy.   Skin:     General: Skin is warm and dry.      Findings: No erythema or rash.   Neurological:      Mental Status: She is alert and oriented to person, place, and time.      Cranial Nerves: No cranial nerve deficit.      Coordination: Coordination normal.      Gait: Gait is intact.   Psychiatric:         Mood and Affect: Affect normal.         Cognition and Memory: Memory normal.         Judgment: Judgment normal.          Diagnostic Tests:  Significant Imaging: I have reviewed and interpreted all pertinent imaging results/findings.        Laboratory Data:  All  pertinent labs have been reviewed.  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 Oxaliplatin was stopped due to severe adverse reaction.     Follow-up CTs and PET in May 2021 showed enlarging retroperitoneal node, concerning for metastatic disease.      She is tolerating FOLFIRI + Avastin well. Most recent PET scan showed complete resolution of previously noted hypermetabolic nodes and no other sites of disease.      Her case has been discussed at the colorectal tumor board ands she is not deemed a surgical candidate. We discussed maintenance 5 FU and Avastin vs full dose chemo as tolerated. Will continue FOLFIRI and Avastin at this time. Reconsider maintenance dose after follow up PET scan.     PET scan with possible progression of the disease, unsure if this is truly progression according to RECIST criteria and mild increase in her SUV, will discuss with radiology and proceed with chemo, with possible repeating short course of repeating imaging in 6-8 weeks, might consider switching to CT's for colon cancer     Chemotherapy-induced neutropenia  5 FU bolus has been dropped. Continue to monitor.     Hypercalcemia  Renal stones   Secondary to mild primary hyperparathyroidism.  Continue  endocrinology and urology follow-up, adequate hydration.  Avoid calcium supplementation.     Hypophosphatemia  Improved with K-Phos.  Continue at this time.    Anxiety  Continue supportive care, is on Wellbutrin. She will follow up with Dr. Palm.     Functional diarrhea  Continue imodium as needed.     Transaminitis  Intermittent. Continue to monitor.     Positive depression screening  Comments:  I have reviewed the positive depression score with the patient and found that no additional intervention is needed at this time.    Positive depression screening  Comments:  I have reviewed the positive depression score which warrants active treatment with psychotherapy and/or medications.  Depression score positive.     I have used clinical judgement based on duration and functional status to consider definite necessity for treatment. She will continue to follow up with Dr. Palm. She declined a psychiatry referral at this time.      ECOG SCORE             Discussion:   No follow-ups on file.    Plan was discussed with the patient at length, and she verbalized understanding. Gail was given an opportunity to ask questions that were answered to her satisfaction, and she was advised to call in the interval if any problems or questions arise.    Electronically signed by Lin Barcenas MD      Route Chart for Scheduling    Med Onc Chart Routing      Follow up with physician 2 weeks. With MD prior to next cycle with blood work CBC/CMP/Phos   Follow up with TAVO    Labs CBC, CMP and phosphorus   Lab interval:     Imaging    Pharmacy appointment    Other referrals          Treatment Plan Information   OP COLORECTAL FOLFIRI + BEVACIZUMAB Q2W   Marah Santo MD   Upcoming Treatment Dates - OP COLORECTAL FOLFIRI + BEVACIZUMAB Q2W    4/20/2022       Pre-Medications       LORazepam injection 1 mg       promethazine (PHENERGAN) 6.25 mg in dextrose 5 % 100 mL IVPB       palonosetron 0.25mg/dexamethasone 20mg in NS IVPB        Chemotherapy       bevacizumab (AVASTIN) 600 mg in sodium chloride 0.9% 100 mL chemo infusion       irinotecan (CAMPTOSAR) 440 mg in sodium chloride 0.9% 500 mL chemo infusion       leucovorin calcium 1,000 mg in dextrose 5 % 250 mL infusion       fluorouraciL 2,400 mg/m2 = 6,050 mg in sodium chloride 0.9% 240 mL chemo infusion       Supportive Care       atropine injection 0.4 mg  5/4/2022       Pre-Medications       LORazepam injection 1 mg       promethazine (PHENERGAN) 6.25 mg in dextrose 5 % 100 mL IVPB       palonosetron 0.25mg/dexamethasone 20mg in NS IVPB       Chemotherapy       bevacizumab (AVASTIN) 600 mg in sodium chloride 0.9% 100 mL chemo infusion       irinotecan (CAMPTOSAR) 440 mg in sodium chloride 0.9% 500 mL chemo infusion       leucovorin calcium 400 mg/m2 = 1,010 mg in dextrose 5 % 250 mL infusion       fluorouraciL 2,400 mg/m2 = 6,050 mg in sodium chloride 0.9% 240 mL chemo infusion       Supportive Care       atropine injection 0.4 mg  5/18/2022       Pre-Medications       LORazepam injection 1 mg       promethazine (PHENERGAN) 6.25 mg in dextrose 5 % 100 mL IVPB       palonosetron 0.25mg/dexamethasone 20mg in NS IVPB       Chemotherapy       bevacizumab (AVASTIN) 600 mg in sodium chloride 0.9% 100 mL chemo infusion       irinotecan (CAMPTOSAR) 440 mg in sodium chloride 0.9% 500 mL chemo infusion       leucovorin calcium 400 mg/m2 = 1,010 mg in dextrose 5 % 250 mL infusion       fluorouraciL 2,400 mg/m2 = 6,050 mg in sodium chloride 0.9% 240 mL chemo infusion       Supportive Care       atropine injection 0.4 mg    Supportive Plan Information  IV FLUIDS AND ELECTROLYTES   Brayden Solo MD   Upcoming Treatment Dates - IV FLUIDS AND ELECTROLYTES    No upcoming days in selected categories.    Therapy Plan Information  Flushes  heparin, porcine (PF) 100 unit/mL injection flush 500 Units  500 Units, Intravenous, Every visit  sodium chloride 0.9% flush 10 mL  10 mL, Intravenous, Every  visit      Answers for HPI/ROS submitted by the patient on 4/4/2022  appetite change : No  unexpected weight change: No  mouth sores: Yes  visual disturbance: No  adenopathy: No          Answers for HPI/ROS submitted by the patient on 4/18/2022  appetite change : No  unexpected weight change: No  mouth sores: Yes  visual disturbance: No  adenopathy: No

## 2022-04-20 NOTE — PLAN OF CARE
Problem: Fatigue  Goal: Improved Activity Tolerance  Outcome: Ongoing, Progressing     Problem: Fatigue  Goal: Improved Activity Tolerance  Intervention: Promote Improved Energy  Flowsheets (Taken 4/20/2022 1200)  Fatigue Management:   activity schedule adjusted   fatigue-related activity identified   paced activity encouraged   frequent rest breaks encouraged  Sleep/Rest Enhancement:   regular sleep/rest pattern promoted   relaxation techniques promoted   natural light exposure provided   room darkened  Activity Management:   Ambulated -L4   Ambulated to bathroom - L4   Ambulated in reyes - L4   Ambulated in room - L4   Up in chair - L3     Problem: Adult Inpatient Plan of Care  Goal: Patient-Specific Goal (Individualized)  Outcome: Ongoing, Progressing  Flowsheets (Taken 4/20/2022 1600)  Anxieties, Fears or Concerns: none  Individualized Care Needs: recliner, warm blanket, dimmed lights  Patient-Specific Goals (Include Timeframe): no s/s of reaction during treatment     Problem: Adult Inpatient Plan of Care  Goal: Plan of Care Review  Outcome: Ongoing, Progressing  Flowsheets (Taken 4/20/2022 1600)  Plan of Care Reviewed With: patient   Pt tolerated her chemo infusions well, NAD. No new c/o voiced. Pt discharged home with a home CADD pump infusing as ordered, pt is familiar with it's use. Pt given a schedule and reviewed, pt verbalized understanding. Pt ambulated out of the clinic without difficulty.

## 2022-04-22 ENCOUNTER — INFUSION (OUTPATIENT)
Dept: INFUSION THERAPY | Facility: HOSPITAL | Age: 52
End: 2022-04-22
Attending: INTERNAL MEDICINE
Payer: COMMERCIAL

## 2022-04-22 VITALS
HEART RATE: 88 BPM | DIASTOLIC BLOOD PRESSURE: 85 MMHG | RESPIRATION RATE: 18 BRPM | TEMPERATURE: 98 F | SYSTOLIC BLOOD PRESSURE: 131 MMHG

## 2022-04-22 DIAGNOSIS — C18.7 MALIGNANT NEOPLASM OF SIGMOID COLON: ICD-10-CM

## 2022-04-22 DIAGNOSIS — C77.2 METASTASIS TO INTESTINAL LYMPH NODE: Primary | ICD-10-CM

## 2022-04-22 PROCEDURE — 63600175 PHARM REV CODE 636 W HCPCS: Mod: PN | Performed by: STUDENT IN AN ORGANIZED HEALTH CARE EDUCATION/TRAINING PROGRAM

## 2022-04-22 PROCEDURE — A4216 STERILE WATER/SALINE, 10 ML: HCPCS | Mod: PN | Performed by: STUDENT IN AN ORGANIZED HEALTH CARE EDUCATION/TRAINING PROGRAM

## 2022-04-22 PROCEDURE — 25000003 PHARM REV CODE 250: Mod: PN | Performed by: STUDENT IN AN ORGANIZED HEALTH CARE EDUCATION/TRAINING PROGRAM

## 2022-04-22 RX ORDER — SODIUM CHLORIDE 0.9 % (FLUSH) 0.9 %
10 SYRINGE (ML) INJECTION
Status: DISCONTINUED | OUTPATIENT
Start: 2022-04-22 | End: 2022-04-22 | Stop reason: HOSPADM

## 2022-04-22 RX ORDER — HEPARIN 100 UNIT/ML
500 SYRINGE INTRAVENOUS
Status: DISCONTINUED | OUTPATIENT
Start: 2022-04-22 | End: 2022-04-22 | Stop reason: HOSPADM

## 2022-04-22 RX ADMIN — Medication 10 ML: at 02:04

## 2022-04-22 RX ADMIN — Medication 500 UNITS: at 02:04

## 2022-04-22 NOTE — PLAN OF CARE
Problem: Adult Inpatient Plan of Care  Goal: Plan of Care Review  Outcome: Ongoing, Progressing  Flowsheets (Taken 4/22/2022 1440)  Plan of Care Reviewed With: patient  Goal: Patient-Specific Goal (Individualized)  Outcome: Ongoing, Progressing     Problem: Fatigue  Goal: Improved Activity Tolerance  Outcome: Ongoing, Progressing   Pt tolerated pump d/c well.   No adverse reaction noted.  PAC flushed with Heparin and de-accessed per protocol.   Pt left clinic in no acute distress.

## 2022-04-29 ENCOUNTER — TELEPHONE (OUTPATIENT)
Dept: INFUSION THERAPY | Facility: HOSPITAL | Age: 52
End: 2022-04-29
Payer: COMMERCIAL

## 2022-04-29 NOTE — TELEPHONE ENCOUNTER
SW called and spoke with pt to confirm information need to complete  [employer] application. SW faxed off application.    Pt informed SW she is going to [facility] next Friday for a second opinion. He doctor is aware and was supportive of pt going. Pt will be taking her mom and daughter for support. SW provided emotional support and will follow pt.      Radha Giordano, JENNIEW

## 2022-05-09 ENCOUNTER — PATIENT MESSAGE (OUTPATIENT)
Dept: HEMATOLOGY/ONCOLOGY | Facility: CLINIC | Age: 52
End: 2022-05-09

## 2022-05-09 ENCOUNTER — OFFICE VISIT (OUTPATIENT)
Dept: HEMATOLOGY/ONCOLOGY | Facility: CLINIC | Age: 52
End: 2022-05-09
Payer: COMMERCIAL

## 2022-05-09 ENCOUNTER — LAB VISIT (OUTPATIENT)
Dept: LAB | Facility: HOSPITAL | Age: 52
End: 2022-05-09
Attending: INTERNAL MEDICINE
Payer: COMMERCIAL

## 2022-05-09 VITALS
HEART RATE: 105 BPM | WEIGHT: 293 LBS | RESPIRATION RATE: 18 BRPM | DIASTOLIC BLOOD PRESSURE: 78 MMHG | SYSTOLIC BLOOD PRESSURE: 138 MMHG | HEIGHT: 66 IN | OXYGEN SATURATION: 97 % | BODY MASS INDEX: 47.09 KG/M2

## 2022-05-09 DIAGNOSIS — C18.7 MALIGNANT NEOPLASM OF SIGMOID COLON: Primary | ICD-10-CM

## 2022-05-09 DIAGNOSIS — C77.9 SECONDARY ADENOCARCINOMA OF LYMPH NODE: ICD-10-CM

## 2022-05-09 DIAGNOSIS — R11.0 NAUSEA: ICD-10-CM

## 2022-05-09 DIAGNOSIS — K76.0 FATTY LIVER: ICD-10-CM

## 2022-05-09 DIAGNOSIS — C77.2 METASTASIS TO INTESTINAL LYMPH NODE: ICD-10-CM

## 2022-05-09 DIAGNOSIS — F41.9 ANXIETY: ICD-10-CM

## 2022-05-09 DIAGNOSIS — C18.7 MALIGNANT NEOPLASM OF SIGMOID COLON: ICD-10-CM

## 2022-05-09 DIAGNOSIS — K59.1 FUNCTIONAL DIARRHEA: ICD-10-CM

## 2022-05-09 LAB
ALBUMIN SERPL BCP-MCNC: 3.9 G/DL (ref 3.5–5.2)
ALP SERPL-CCNC: 151 U/L (ref 55–135)
ALT SERPL W/O P-5'-P-CCNC: 140 U/L (ref 10–44)
ANION GAP SERPL CALC-SCNC: 10 MMOL/L (ref 8–16)
AST SERPL-CCNC: 96 U/L (ref 10–40)
BASOPHILS # BLD AUTO: 0.05 K/UL (ref 0–0.2)
BASOPHILS NFR BLD: 1.2 % (ref 0–1.9)
BILIRUB SERPL-MCNC: 0.6 MG/DL (ref 0.1–1)
BILIRUB UR QL STRIP: NEGATIVE
BUN SERPL-MCNC: 12 MG/DL (ref 6–20)
CALCIUM SERPL-MCNC: 10.9 MG/DL (ref 8.7–10.5)
CHLORIDE SERPL-SCNC: 107 MMOL/L (ref 95–110)
CLARITY UR: CLEAR
CO2 SERPL-SCNC: 24 MMOL/L (ref 23–29)
COLOR UR: YELLOW
CREAT SERPL-MCNC: 0.9 MG/DL (ref 0.5–1.4)
DIFFERENTIAL METHOD: ABNORMAL
EOSINOPHIL # BLD AUTO: 0.2 K/UL (ref 0–0.5)
EOSINOPHIL NFR BLD: 5.3 % (ref 0–8)
ERYTHROCYTE [DISTWIDTH] IN BLOOD BY AUTOMATED COUNT: 15.9 % (ref 11.5–14.5)
EST. GFR  (AFRICAN AMERICAN): >60 ML/MIN/1.73 M^2
EST. GFR  (NON AFRICAN AMERICAN): >60 ML/MIN/1.73 M^2
GLUCOSE SERPL-MCNC: 103 MG/DL (ref 70–110)
GLUCOSE UR QL STRIP: NEGATIVE
HCT VFR BLD AUTO: 45.9 % (ref 37–48.5)
HGB BLD-MCNC: 14.8 G/DL (ref 12–16)
HGB UR QL STRIP: NEGATIVE
IMM GRANULOCYTES # BLD AUTO: 0.02 K/UL (ref 0–0.04)
IMM GRANULOCYTES NFR BLD AUTO: 0.5 % (ref 0–0.5)
KETONES UR QL STRIP: NEGATIVE
LEUKOCYTE ESTERASE UR QL STRIP: NEGATIVE
LYMPHOCYTES # BLD AUTO: 1.9 K/UL (ref 1–4.8)
LYMPHOCYTES NFR BLD: 42.9 % (ref 18–48)
MCH RBC QN AUTO: 30.7 PG (ref 27–31)
MCHC RBC AUTO-ENTMCNC: 32.2 G/DL (ref 32–36)
MCV RBC AUTO: 95 FL (ref 82–98)
MONOCYTES # BLD AUTO: 0.6 K/UL (ref 0.3–1)
MONOCYTES NFR BLD: 13.5 % (ref 4–15)
NEUTROPHILS # BLD AUTO: 1.6 K/UL (ref 1.8–7.7)
NEUTROPHILS NFR BLD: 36.6 % (ref 38–73)
NITRITE UR QL STRIP: NEGATIVE
NRBC BLD-RTO: 0 /100 WBC
PH UR STRIP: 7 [PH] (ref 5–8)
PHOSPHATE SERPL-MCNC: 2.7 MG/DL (ref 2.7–4.5)
PLATELET # BLD AUTO: 215 K/UL (ref 150–450)
PLATELET BLD QL SMEAR: ABNORMAL
PMV BLD AUTO: 10.6 FL (ref 9.2–12.9)
POTASSIUM SERPL-SCNC: 3.9 MMOL/L (ref 3.5–5.1)
PROT SERPL-MCNC: 7.3 G/DL (ref 6–8.4)
PROT UR QL STRIP: NEGATIVE
RBC # BLD AUTO: 4.82 M/UL (ref 4–5.4)
SODIUM SERPL-SCNC: 141 MMOL/L (ref 136–145)
SP GR UR STRIP: 1.02 (ref 1–1.03)
URN SPEC COLLECT METH UR: NORMAL
WBC # BLD AUTO: 4.31 K/UL (ref 3.9–12.7)

## 2022-05-09 PROCEDURE — 36415 COLL VENOUS BLD VENIPUNCTURE: CPT | Mod: PN | Performed by: STUDENT IN AN ORGANIZED HEALTH CARE EDUCATION/TRAINING PROGRAM

## 2022-05-09 PROCEDURE — 80053 COMPREHEN METABOLIC PANEL: CPT | Mod: PN | Performed by: STUDENT IN AN ORGANIZED HEALTH CARE EDUCATION/TRAINING PROGRAM

## 2022-05-09 PROCEDURE — 1159F MED LIST DOCD IN RCRD: CPT | Mod: CPTII,S$GLB,, | Performed by: INTERNAL MEDICINE

## 2022-05-09 PROCEDURE — 3008F PR BODY MASS INDEX (BMI) DOCUMENTED: ICD-10-PCS | Mod: CPTII,S$GLB,, | Performed by: INTERNAL MEDICINE

## 2022-05-09 PROCEDURE — 99215 PR OFFICE/OUTPT VISIT, EST, LEVL V, 40-54 MIN: ICD-10-PCS | Mod: S$GLB,,, | Performed by: INTERNAL MEDICINE

## 2022-05-09 PROCEDURE — 1159F PR MEDICATION LIST DOCUMENTED IN MEDICAL RECORD: ICD-10-PCS | Mod: CPTII,S$GLB,, | Performed by: INTERNAL MEDICINE

## 2022-05-09 PROCEDURE — 3075F PR MOST RECENT SYSTOLIC BLOOD PRESS GE 130-139MM HG: ICD-10-PCS | Mod: CPTII,S$GLB,, | Performed by: INTERNAL MEDICINE

## 2022-05-09 PROCEDURE — 3078F DIAST BP <80 MM HG: CPT | Mod: CPTII,S$GLB,, | Performed by: INTERNAL MEDICINE

## 2022-05-09 PROCEDURE — 3075F SYST BP GE 130 - 139MM HG: CPT | Mod: CPTII,S$GLB,, | Performed by: INTERNAL MEDICINE

## 2022-05-09 PROCEDURE — 3008F BODY MASS INDEX DOCD: CPT | Mod: CPTII,S$GLB,, | Performed by: INTERNAL MEDICINE

## 2022-05-09 PROCEDURE — 84100 ASSAY OF PHOSPHORUS: CPT | Mod: PN | Performed by: STUDENT IN AN ORGANIZED HEALTH CARE EDUCATION/TRAINING PROGRAM

## 2022-05-09 PROCEDURE — 99999 PR PBB SHADOW E&M-EST. PATIENT-LVL V: ICD-10-PCS | Mod: PBBFAC,,, | Performed by: INTERNAL MEDICINE

## 2022-05-09 PROCEDURE — 99999 PR PBB SHADOW E&M-EST. PATIENT-LVL V: CPT | Mod: PBBFAC,,, | Performed by: INTERNAL MEDICINE

## 2022-05-09 PROCEDURE — 99215 OFFICE O/P EST HI 40 MIN: CPT | Mod: S$GLB,,, | Performed by: INTERNAL MEDICINE

## 2022-05-09 PROCEDURE — 81003 URINALYSIS AUTO W/O SCOPE: CPT | Mod: PN | Performed by: INTERNAL MEDICINE

## 2022-05-09 PROCEDURE — 85025 COMPLETE CBC W/AUTO DIFF WBC: CPT | Mod: PN | Performed by: STUDENT IN AN ORGANIZED HEALTH CARE EDUCATION/TRAINING PROGRAM

## 2022-05-09 PROCEDURE — 3078F PR MOST RECENT DIASTOLIC BLOOD PRESSURE < 80 MM HG: ICD-10-PCS | Mod: CPTII,S$GLB,, | Performed by: INTERNAL MEDICINE

## 2022-05-09 RX ORDER — SODIUM CHLORIDE 0.9 % (FLUSH) 0.9 %
10 SYRINGE (ML) INJECTION
Status: CANCELLED | OUTPATIENT
Start: 2022-05-13

## 2022-05-09 RX ORDER — LORAZEPAM 2 MG/ML
1 INJECTION INTRAMUSCULAR
Status: CANCELLED | OUTPATIENT
Start: 2022-05-11 | End: 2022-05-09

## 2022-05-09 RX ORDER — ATROPINE SULFATE 0.4 MG/ML
0.4 INJECTION, SOLUTION ENDOTRACHEAL; INTRAMEDULLARY; INTRAMUSCULAR; INTRAVENOUS; SUBCUTANEOUS ONCE AS NEEDED
Status: CANCELLED | OUTPATIENT
Start: 2022-05-11

## 2022-05-09 RX ORDER — HEPARIN 100 UNIT/ML
500 SYRINGE INTRAVENOUS
Status: CANCELLED | OUTPATIENT
Start: 2022-05-13

## 2022-05-09 RX ORDER — BUPROPION HYDROCHLORIDE 150 MG/1
150 TABLET, EXTENDED RELEASE ORAL
COMMUNITY
Start: 2021-11-15 | End: 2022-06-15 | Stop reason: SDUPTHER

## 2022-05-09 RX ORDER — PROMETHAZINE HYDROCHLORIDE 12.5 MG/1
TABLET ORAL
COMMUNITY
Start: 2021-09-23 | End: 2022-06-15 | Stop reason: SDUPTHER

## 2022-05-09 RX ORDER — ONDANSETRON 8 MG/1
TABLET, ORALLY DISINTEGRATING ORAL
COMMUNITY
Start: 2021-09-23 | End: 2022-05-30 | Stop reason: SDUPTHER

## 2022-05-09 RX ORDER — CYCLOBENZAPRINE HCL 10 MG
10 TABLET ORAL
COMMUNITY
Start: 2021-12-15 | End: 2022-06-15 | Stop reason: SDUPTHER

## 2022-05-09 RX ORDER — LIDOCAINE AND PRILOCAINE 25; 25 MG/G; MG/G
CREAM TOPICAL
COMMUNITY
Start: 2021-06-15 | End: 2022-06-15 | Stop reason: SDUPTHER

## 2022-05-09 RX ORDER — BUSPIRONE HYDROCHLORIDE 10 MG/1
TABLET ORAL
COMMUNITY
Start: 2021-12-15 | End: 2022-06-15 | Stop reason: SDUPTHER

## 2022-05-09 RX ORDER — GRANISETRON 3.1 MG/24H
PATCH TRANSDERMAL
COMMUNITY
Start: 2021-10-06 | End: 2022-05-09 | Stop reason: SDUPTHER

## 2022-05-09 RX ORDER — SODIUM CHLORIDE 0.9 % (FLUSH) 0.9 %
10 SYRINGE (ML) INJECTION
Status: CANCELLED | OUTPATIENT
Start: 2022-05-11

## 2022-05-09 RX ORDER — HEPARIN 100 UNIT/ML
500 SYRINGE INTRAVENOUS
Status: CANCELLED | OUTPATIENT
Start: 2022-05-11

## 2022-05-09 RX ORDER — GRANISETRON 3.1 MG/24H
PATCH TRANSDERMAL
Qty: 4 PATCH | Refills: 3 | Status: SHIPPED | OUTPATIENT
Start: 2022-05-09 | End: 2022-06-15 | Stop reason: SDUPTHER

## 2022-05-09 NOTE — PROGRESS NOTES
PROGRESS NOTE    Subjective:       Patient ID: Gail Mckinnon is a 52 y.o. female.  MRN: [id number]  : 1970    Chief Complaint: Malignant neoplasm of sigmoid colon     History of Present Illness:   Gail Mckinnon is a 52 y.o. female who presents with colon cancer, initially stage III and now with LN recurrence.       She completed adjuvant FOLFOX in 2020. She tolerated only 4 cycles of FOLFOX before she developed an infusion reaction to oxaliplatin in cycle 5 was well as cycle 6. She then completed 6 cycles of infusional 5 FU.     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 She is now on second line therapy with FOLFIRI and Avastin.      She presented to the ED on  with left flank pain. CT renal stone study showed Moderate hydronephrosis on the left secondary to 3 mm calculus at the UPJ.     Interim history:  She presents to discuss symptoms, labs,and further recommendations.   She had a PET scan mid April that showed possible progression of her disease with enlarging lymphadenopahty from 1.7cm to 1.9 cm, no new lesion.    For now, we have continued FOLFIRI+ Avastin with plan to repeat short term imaging. She has been to [facility] for another opinion.  Full records are not available today but they have concurred with our treatment plan to continue FOLFIRI +Avastin at this time and repeat scans at week 8. NGS testing has also been sent.      Reports fatigue and nausea with chemo. Diarrhea is controlled with anti diarrheal agents. Reports stable anxiety.       She reports a stable weight, appetite and performance status.      Oncology History:  Oncology History   Malignant neoplasm of sigmoid colon   3/16/2020 Initial Diagnosis    Malignant neoplasm of sigmoid colon     3/31/2020 Cancer Staged    Staging form: Colon and Rectum, AJCC 8th Edition  - Clinical stage from 3/31/2020: Stage IIIC (cT4b, cN2a, cM0)      5/6/2020 - 11/17/2020 Chemotherapy    Treatment Summary   Plan Name: OP FOLFOX 6 Q2W  Treatment Goal: Curative  Status: Inactive  Start Date: 5/6/2020  End Date: 11/6/2020  Provider: Dylan Leyva MD  Chemotherapy: fluorouraciL injection 945 mg, 400 mg/m2 = 945 mg, Intravenous, Clinic/[facility] 1 time, 14 of 14 cycles  Administration: 945 mg (5/6/2020), 945 mg (5/20/2020), 945 mg (6/3/2020), 945 mg (6/17/2020), 945 mg (7/29/2020), 945 mg (8/12/2020), 945 mg (8/26/2020), 945 mg (9/9/2020), 945 mg (9/23/2020), 945 mg (10/6/2020), 945 mg (10/21/2020), 945 mg (11/4/2020)  fluorouraciL 2,400 mg/m2 = 5,665 mg in sodium chloride 0.9% 240 mL chemo infusion, 2,400 mg/m2 = 5,665 mg, Intravenous, Over 46 hours, 14 of 14 cycles  Administration: 5,665 mg (5/6/2020), 5,665 mg (5/20/2020), 5,665 mg (6/3/2020), 5,665 mg (6/17/2020), 5,665 mg (7/29/2020), 5,665 mg (8/12/2020), 5,665 mg (8/26/2020), 5,665 mg (9/9/2020), 5,665 mg (9/23/2020), 5,665 mg (10/6/2020), 5,665 mg (10/21/2020), 5,665 mg (11/4/2020)  leucovorin calcium 900 mg in dextrose 5 % 250 mL infusion, 945 mg, Intravenous, Clinic/[facility] 1 time, 14 of 14 cycles  Administration: 900 mg (5/6/2020), 900 mg (5/20/2020), 900 mg (6/3/2020), 900 mg (6/17/2020), 945 mg (6/30/2020), 945 mg (7/20/2020), 945 mg (7/29/2020), 945 mg (8/12/2020), 945 mg (8/26/2020), 945 mg (9/9/2020), 945 mg (9/23/2020), 945 mg (10/6/2020), 945 mg (10/21/2020), 945 mg (11/4/2020)  oxaliplatin (ELOXATIN) 200 mg in dextrose 5 % 500 mL chemo infusion, 201 mg, Intravenous, Clinic/[facility] 1 time, 6 of 6 cycles  Dose modification: 65 mg/m2 (original dose 85 mg/m2, Cycle 6)  Administration: 200 mg (5/6/2020), 200 mg (5/20/2020), 200 mg (6/3/2020), 200 mg (6/17/2020), 201 mg (6/30/2020), 150 mg (7/20/2020)     6/16/2021 -  Chemotherapy    Treatment Summary   Plan Name: OP COLORECTAL FOLFIRI + BEVACIZUMAB Q2W  Treatment Goal: Control  Status: Active  Start Date: 6/16/2021  End Date: 5/27/2022 (Planned)  Provider: Marah  NGUYEN Santo MD  Chemotherapy: fluorouraciL injection 990 mg, 400 mg/m2 = 990 mg, Intravenous, [facility] 1 time, 15 of 15 cycles  Administration: 990 mg (6/16/2021), 990 mg (6/30/2021), 990 mg (7/14/2021), 980 mg (7/28/2021), 990 mg (8/11/2021), 990 mg (8/25/2021), 990 mg (9/8/2021), 990 mg (9/22/2021), 990 mg (10/6/2021), 990 mg (10/20/2021), 990 mg (11/3/2021), 990 mg (12/1/2021), 990 mg (12/15/2021), 990 mg (11/17/2021), 990 mg (12/28/2021)  fluorouraciL 2,400 mg/m2 = 5,950 mg in sodium chloride 0.9% 240 mL chemo infusion, 2,400 mg/m2 = 5,950 mg, Intravenous, Over 46 hours, 22 of 24 cycles  Administration: 5,950 mg (6/16/2021), 5,950 mg (6/30/2021), 5,950 mg (7/14/2021), 5,880 mg (7/28/2021), 5,930 mg (8/11/2021), 5,930 mg (8/25/2021), 5,930 mg (9/8/2021), 5,930 mg (9/22/2021), 5,930 mg (10/6/2021), 5,930 mg (10/20/2021), 5,930 mg (11/3/2021), 5,930 mg (12/1/2021), 5,930 mg (12/15/2021), 5,930 mg (11/17/2021), 5,930 mg (12/28/2021), 6,025 mg (3/9/2022), 6,025 mg (2/23/2022), 5,930 mg (2/2/2022), 5,930 mg (1/19/2022), 6,050 mg (4/20/2022), 6,025 mg (4/6/2022), 6,025 mg (3/23/2022)  bevacizumab (AVASTIN) 600 mg in sodium chloride 0.9% 100 mL chemo infusion, 660 mg, Intravenous, Clinic/HOD 1 time, 22 of 24 cycles  Administration: 600 mg (6/30/2021), 600 mg (7/14/2021), 600 mg (7/28/2021), 600 mg (6/16/2021), 600 mg (8/11/2021), 655 mg (8/25/2021), 600 mg (9/8/2021), 600 mg (9/22/2021), 600 mg (10/6/2021), 600 mg (10/20/2021), 600 mg (11/3/2021), 655 mg (12/1/2021), 600 mg (12/15/2021), 600 mg (11/17/2021), 600 mg (12/28/2021), 600 mg (3/9/2022), 600 mg (2/23/2022), 600 mg (2/2/2022), 600 mg (1/19/2022), 600 mg (4/20/2022), 680 mg (4/6/2022), 600 mg (3/23/2022)  irinotecan (CAMPTOSAR) 440 mg in sodium chloride 0.9% 500 mL chemo infusion, 446 mg, Intravenous, Clinic/HOD 1 time, 22 of 24 cycles  Administration: 440 mg (6/16/2021), 440 mg (6/30/2021), 440 mg (7/14/2021), 440 mg (7/28/2021), 440 mg (8/11/2021), 444 mg  (8/25/2021), 440 mg (9/8/2021), 440 mg (9/22/2021), 440 mg (10/6/2021), 440 mg (10/20/2021), 440 mg (11/3/2021), 440 mg (12/1/2021), 440 mg (12/15/2021), 440 mg (11/17/2021), 440 mg (12/28/2021), 440 mg (3/9/2022), 440 mg (2/23/2022), 440 mg (2/2/2022), 440 mg (1/19/2022), 440 mg (4/20/2022), 440 mg (4/6/2022), 440 mg (3/24/2022)  leucovorin calcium 400 mg/m2 = 990 mg in dextrose 5 % 250 mL infusion, 400 mg/m2 = 990 mg, Intravenous, Clinic/[facility] 1 time, 22 of 24 cycles  Administration: 990 mg (6/16/2021), 990 mg (6/30/2021), 990 mg (7/14/2021), 980 mg (7/28/2021), 990 mg (8/11/2021), 990 mg (8/25/2021), 990 mg (9/8/2021), 990 mg (9/22/2021), 990 mg (10/6/2021), 990 mg (10/20/2021), 990 mg (11/3/2021), 990 mg (12/1/2021), 990 mg (12/15/2021), 990 mg (11/17/2021), 990 mg (12/28/2021), 1,000 mg (3/9/2022), 1,000 mg (2/23/2022), 990 mg (2/2/2022), 990 mg (1/19/2022), 1,000 mg (4/20/2022), 1,000 mg (4/6/2022), 1,000 mg (3/24/2022)     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   4/3/2020 Initial Diagnosis    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     5/6/2020 - 11/17/2020 Chemotherapy    Treatment Summary   Plan Name: OP FOLFOX 6 Q2W  Treatment Goal: Curative  Status: Inactive  Start Date: 5/6/2020  End Date: 11/6/2020  Provider: Dylan Leyva MD  Chemotherapy: fluorouraciL injection 945 mg, 400 mg/m2 = 945 mg, Intravenous, Clinic/[facility] 1 time, 14 of 14 cycles  Administration: 945 mg (5/6/2020), 945 mg (5/20/2020), 945 mg (6/3/2020), 945 mg (6/17/2020), 945 mg (7/29/2020), 945 mg (8/12/2020), 945 mg (8/26/2020), 945 mg (9/9/2020), 945 mg (9/23/2020), 945 mg (10/6/2020), 945 mg (10/21/2020), 945 mg (11/4/2020)  fluorouraciL 2,400 mg/m2 = 5,665 mg in sodium chloride 0.9% 240 mL chemo infusion, 2,400 mg/m2 = 5,665 mg, Intravenous, Over 46 hours, 14 of 14 cycles  Administration: 5,665 mg (5/6/2020), 5,665 mg (5/20/2020), 5,665 mg (6/3/2020), 5,665 mg (6/17/2020), 5,665 mg (7/29/2020), 5,665 mg (8/12/2020), 5,665 mg (8/26/2020), 5,665  mg (9/9/2020), 5,665 mg (9/23/2020), 5,665 mg (10/6/2020), 5,665 mg (10/21/2020), 5,665 mg (11/4/2020)  leucovorin calcium 900 mg in dextrose 5 % 250 mL infusion, 945 mg, Intravenous, Clinic/[facility] 1 time, 14 of 14 cycles  Administration: 900 mg (5/6/2020), 900 mg (5/20/2020), 900 mg (6/3/2020), 900 mg (6/17/2020), 945 mg (6/30/2020), 945 mg (7/20/2020), 945 mg (7/29/2020), 945 mg (8/12/2020), 945 mg (8/26/2020), 945 mg (9/9/2020), 945 mg (9/23/2020), 945 mg (10/6/2020), 945 mg (10/21/2020), 945 mg (11/4/2020)  oxaliplatin (ELOXATIN) 200 mg in dextrose 5 % 500 mL chemo infusion, 201 mg, Intravenous, Clinic/[facility] 1 time, 6 of 6 cycles  Dose modification: 65 mg/m2 (original dose 85 mg/m2, Cycle 6)  Administration: 200 mg (5/6/2020), 200 mg (5/20/2020), 200 mg (6/3/2020), 200 mg (6/17/2020), 201 mg (6/30/2020), 150 mg (7/20/2020)     6/16/2021 -  Chemotherapy    Treatment Summary   Plan Name: OP COLORECTAL FOLFIRI + BEVACIZUMAB Q2W  Treatment Goal: Control  Status: Active  Start Date: 6/16/2021  End Date: 5/27/2022 (Planned)  Provider: Marah Santo MD  Chemotherapy: fluorouraciL injection 990 mg, 400 mg/m2 = 990 mg, Intravenous, Clinic/[facility] 1 time, 15 of 15 cycles  Administration: 990 mg (6/16/2021), 990 mg (6/30/2021), 990 mg (7/14/2021), 980 mg (7/28/2021), 990 mg (8/11/2021), 990 mg (8/25/2021), 990 mg (9/8/2021), 990 mg (9/22/2021), 990 mg (10/6/2021), 990 mg (10/20/2021), 990 mg (11/3/2021), 990 mg (12/1/2021), 990 mg (12/15/2021), 990 mg (11/17/2021), 990 mg (12/28/2021)  fluorouraciL 2,400 mg/m2 = 5,950 mg in sodium chloride 0.9% 240 mL chemo infusion, 2,400 mg/m2 = 5,950 mg, Intravenous, Over 46 hours, 22 of 24 cycles  Administration: 5,950 mg (6/16/2021), 5,950 mg (6/30/2021), 5,950 mg (7/14/2021), 5,880 mg (7/28/2021), 5,930 mg (8/11/2021), 5,930 mg (8/25/2021), 5,930 mg (9/8/2021), 5,930 mg (9/22/2021), 5,930 mg (10/6/2021), 5,930 mg (10/20/2021), 5,930 mg (11/3/2021), 5,930 mg (12/1/2021), 5,930 mg  (12/15/2021), 5,930 mg (11/17/2021), 5,930 mg (12/28/2021), 6,025 mg (3/9/2022), 6,025 mg (2/23/2022), 5,930 mg (2/2/2022), 5,930 mg (1/19/2022), 6,050 mg (4/20/2022), 6,025 mg (4/6/2022), 6,025 mg (3/23/2022)  bevacizumab (AVASTIN) 600 mg in sodium chloride 0.9% 100 mL chemo infusion, 660 mg, Intravenous, Clinic/HOD 1 time, 22 of 24 cycles  Administration: 600 mg (6/30/2021), 600 mg (7/14/2021), 600 mg (7/28/2021), 600 mg (6/16/2021), 600 mg (8/11/2021), 655 mg (8/25/2021), 600 mg (9/8/2021), 600 mg (9/22/2021), 600 mg (10/6/2021), 600 mg (10/20/2021), 600 mg (11/3/2021), 655 mg (12/1/2021), 600 mg (12/15/2021), 600 mg (11/17/2021), 600 mg (12/28/2021), 600 mg (3/9/2022), 600 mg (2/23/2022), 600 mg (2/2/2022), 600 mg (1/19/2022), 600 mg (4/20/2022), 680 mg (4/6/2022), 600 mg (3/23/2022)  irinotecan (CAMPTOSAR) 440 mg in sodium chloride 0.9% 500 mL chemo infusion, 446 mg, Intravenous, Clinic/[facility] 1 time, 22 of 24 cycles  Administration: 440 mg (6/16/2021), 440 mg (6/30/2021), 440 mg (7/14/2021), 440 mg (7/28/2021), 440 mg (8/11/2021), 444 mg (8/25/2021), 440 mg (9/8/2021), 440 mg (9/22/2021), 440 mg (10/6/2021), 440 mg (10/20/2021), 440 mg (11/3/2021), 440 mg (12/1/2021), 440 mg (12/15/2021), 440 mg (11/17/2021), 440 mg (12/28/2021), 440 mg (3/9/2022), 440 mg (2/23/2022), 440 mg (2/2/2022), 440 mg (1/19/2022), 440 mg (4/20/2022), 440 mg (4/6/2022), 440 mg (3/24/2022)  leucovorin calcium 400 mg/m2 = 990 mg in dextrose 5 % 250 mL infusion, 400 mg/m2 = 990 mg, Intravenous, Clinic/HOD 1 time, 22 of 24 cycles  Administration: 990 mg (6/16/2021), 990 mg (6/30/2021), 990 mg (7/14/2021), 980 mg (7/28/2021), 990 mg (8/11/2021), 990 mg (8/25/2021), 990 mg (9/8/2021), 990 mg (9/22/2021), 990 mg (10/6/2021), 990 mg (10/20/2021), 990 mg (11/3/2021), 990 mg (12/1/2021), 990 mg (12/15/2021), 990 mg (11/17/2021), 990 mg (12/28/2021), 1,000 mg (3/9/2022), 1,000 mg (2/23/2022), 990 mg (2/2/2022), 990 mg (1/19/2022), 1,000 mg  (2022), 1,000 mg (2022), 1,000 mg (3/24/2022)         History:  Past Medical History:   Diagnosis Date    Anxiety     Depression     FH: ovarian cancer 3/16/2020    Hx of psychiatric care     Effexor, Paxil, Lexapro, Zoloft, Wellbutrin, Trazodone Buspar    Hyperthyroidism     Hypothyroid     Kidney calculi     Malignant neoplasm of sigmoid colon 3/16/2020    Menorrhagia     Multinodular goiter 2012    Palpitation     Psychiatric problem     Venous insufficiency       Past Surgical History:   Procedure Laterality Date     SECTION, CLASSIC      x3    COLONOSCOPY N/A 2020    Procedure: COLONOSCOPY;  Surgeon: Shane Parker MD;  [facility];  Service: Endoscopy;  Laterality: N/A;    CYSTOSCOPY W/ URETERAL STENT PLACEMENT Bilateral 3/25/2020    Procedure: CYSTOSCOPY, WITH URETERAL STENT INSERTION;  Surgeon: Claudio Tyson MD;  Location: [facility] OR [address] [place] [address];  Service: Urology;  Laterality: Bilateral;    INSERTION OF TUNNELED CENTRAL VENOUS CATHETER (CVC) WITH SUBCUTANEOUS PORT N/A 2020    Procedure: [id number]-CATH;  Surgeon: [facility] Diagnostic Provider;  Location: [facility] OR [address] [place] [address];  Service: Radiology;  Laterality: N/A;  Room 189/Cindy    LAPAROSCOPIC SIGMOIDECTOMY N/A 3/25/2020    Procedure: COLECTOMY, SIGMOID, LAPAROSCOPIC, flex sig, ERAS high;  Surgeon: Silvio Man MD;  Location: [facility] OR [address] [place] [address];  Service: Colon and Rectal;  Laterality: N/A;    MOBILIZATION OF SPLENIC FLEXURE  3/25/2020    Procedure: MOBILIZATION, SPLENIC FLEXURE;  Surgeon: Silvio Man MD;  Location: [facility] OR [address] [place] [address];  Service: Colon and Rectal;;    tonsillectomy      TOTAL ABDOMINAL HYSTERECTOMY W/ BILATERAL SALPINGOOPHORECTOMY N/A 3/25/2020    Procedure: HYSTERECTOMY, TOTAL, ABDOMINAL, WITH BILATERAL SALPINGO-OOPHORECTOMY;  Surgeon: Keron Brady MD;  Location: [facility] OR [address] [place] [address];  Service: Oncology;  Laterality: N/A;     Family History   Problem Relation Age of  Onset    Heart disease Father     Diabetes Mother 65    Drug abuse Brother     Drug abuse Brother     Cancer Maternal Aunt         lung cancer    Cancer Maternal Grandmother         stomach cancer- started in ovaries    Ovarian cancer Maternal Grandmother         stomach cancer- started in ovaries    Colon cancer Paternal Uncle     Cancer Paternal Uncle     Ovarian cancer Maternal Aunt     Ovarian cancer Maternal Aunt     Ovarian cancer Cousin         mother had ovarian cancer    Drug abuse Son     Drug abuse Son         clean/sober since 2012    Colon cancer Son     No Known Problems Daughter     Uterine cancer Neg Hx     Breast cancer Neg Hx      Social History     Tobacco Use    Smoking status: Never Smoker    Smokeless tobacco: Never Used   Substance and Sexual Activity    Alcohol use: Yes     Comment: occasionally- twice monthly    Drug use: Never    Sexual activity: Yes     Partners: Male     Birth control/protection: See Surgical Hx        ROS:   Review of Systems   Constitutional: Positive for malaise/fatigue. Negative for fever and weight loss.   HENT: Negative for congestion, hearing loss, nosebleeds and sore throat.    Eyes: Negative for double vision and photophobia.   Respiratory: Positive for shortness of breath. Negative for cough, hemoptysis, sputum production and wheezing.    Cardiovascular: Negative for chest pain, palpitations, orthopnea and leg swelling.   Gastrointestinal: Positive for nausea. Negative for abdominal pain, blood in stool, constipation, diarrhea, heartburn and vomiting.   Genitourinary: Negative for dysuria, frequency, hematuria and urgency.   Musculoskeletal: Negative for back pain, joint pain and myalgias.   Skin: Negative for itching and rash.   Neurological: Negative for dizziness, tingling, seizures, weakness and headaches.   Endo/Heme/Allergies: Negative for polydipsia. Does not bruise/bleed easily.   Psychiatric/Behavioral: Negative for depression and  "memory loss. The patient is nervous/anxious. The patient does not have insomnia.         Objective:     Vitals:    05/09/22 1409   BP: 138/78   Pulse: 105   Resp: 18   TempSrc: Temporal   SpO2: 97%   Weight: (!) 136.6 kg (301 lb 2.4 oz)   Height: 5' 6" (1.676 m)   PainSc: 0-No pain     Wt Readings from Last 10 Encounters:   05/09/22 (!) 136.6 kg (301 lb 2.4 oz)   04/20/22 (!) 136.2 kg (300 lb 4.3 oz)   04/20/22 (!) 136.2 kg (300 lb 4.3 oz)   04/08/22 135.7 kg (299 lb 2.6 oz)   04/06/22 135.7 kg (299 lb 2.6 oz)   04/04/22 135.7 kg (299 lb 2.6 oz)   03/24/22 134.1 kg (295 lb 10.2 oz)   03/23/22 134.1 kg (295 lb 10.2 oz)   03/21/22 134.1 kg (295 lb 10.2 oz)   03/09/22 135 kg (297 lb 9.9 oz)       Physical Examination:   Physical Exam  Vitals and nursing note reviewed.   Constitutional:       General: She is not in acute distress.     Appearance: She is not diaphoretic.   HENT:      Head: Normocephalic.      Mouth/Throat:      Pharynx: No oropharyngeal exudate.   Eyes:      General: No scleral icterus.     Conjunctiva/sclera: Conjunctivae normal.   Neck:      Thyroid: No thyromegaly.   Cardiovascular:      Rate and Rhythm: Normal rate and regular rhythm.      Heart sounds: Normal heart sounds. No murmur heard.  Pulmonary:      Effort: Pulmonary effort is normal. No respiratory distress.      Breath sounds: No stridor. No wheezing or rales.   Chest:      Chest wall: No tenderness.   Abdominal:      General: Bowel sounds are normal. There is no distension.      Palpations: Abdomen is soft. There is no mass.      Tenderness: There is no abdominal tenderness. There is no rebound.   Musculoskeletal:         General: No tenderness or deformity. Normal range of motion.      Cervical back: Neck supple.   Lymphadenopathy:      Cervical: No cervical adenopathy.   Skin:     General: Skin is warm and dry.      Findings: No erythema or rash.   Neurological:      Mental Status: She is alert and oriented to person, place, and time.    "   Cranial Nerves: No cranial nerve deficit.      Coordination: Coordination normal.      Gait: Gait is intact.   Psychiatric:         Mood and Affect: Affect normal.         Cognition and Memory: Memory normal.         Judgment: Judgment normal.          Diagnostic Tests:  Significant Imaging: I have reviewed and interpreted all pertinent imaging results/findings.    Laboratory Data:  All pertinent labs have been reviewed.  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 Oxaliplatin was stopped due to severe adverse reaction.     Follow-up CTs and PET in May 2021 showed enlarging retroperitoneal node, concerning for metastatic disease.      She is tolerating FOLFIRI + Avastin well. Most recent PET scan showed slight enlargement of one lymph node, does not meet criteria for disease progression. Continue current therapy and repeat PET in mid June.     Continue to co ordinate care with [facility] as needed. Follow up NGS panel.     Nausea  -     granisetron (SANCUSO) 3.1 mg/24 hour; once a week.  Dispense: 4 patch; Refill: 3    Fatty liver  LFTs recently rising compared to baseline. No dose adjustment is needed at this time. Will refer to Hepatology for further evaluation.     -     Ambulatory referral/consult to Hepatology; Future; Expected date: 05/16/2022    Anxiety  Continue supportive care, is on  Wellbutrin. She will follow up with Dr. Palm.      Functional diarrhea  Continue imodium as needed.     Other orders  -     LORazepam injection 1 mg  -     promethazine (PHENERGAN) 6.25 mg in dextrose 5 % 100 mL IVPB  -     palonosetron 0.25mg/dexamethasone 20mg in NS IVPB  -     bevacizumab (AVASTIN) 600 mg in sodium chloride 0.9% 100 mL chemo infusion  -     irinotecan (CAMPTOSAR) 440 mg in sodium chloride 0.9% 500 mL chemo infusion  -     leucovorin calcium 400 mg/m2 = 1,010 mg in dextrose 5 % 250 mL infusion  -     fluorouraciL 2,400 mg/m2 = 6,050 mg in sodium chloride 0.9% 240 mL chemo infusion  -     atropine injection 0.4 mg  -     sodium chloride 0.9% 250 mL flush bag  -     sodium chloride 0.9% flush 10 mL  -     heparin, porcine (PF) 100 unit/mL injection flush 500 Units  -     alteplase injection 2 mg  -     sodium chloride 0.9% flush 10 mL  -     heparin, porcine (PF) 100 unit/mL injection flush 500 Units  -     alteplase injection 2 mg       ECOG SCORE    1 - Restricted in strenuous activity-ambulatory and able to carry out work of a light nature           Discussion:   No follow-ups on file.    Plan was discussed with the patient at length, and she verbalized understanding. Gail was given an opportunity to ask questions that were answered to her satisfaction, and she was advised to call in the interval if any problems or questions arise.    Electronically signed by Marah Santo MD      Route Chart for Scheduling    Med Onc Chart Routing      Follow up with physician 2 weeks. Md, labs and prior to chemo . Refer to Hepatology    Follow up with TAVO    Labs CMP and CBC   Lab interval:     Imaging    Pharmacy appointment    Other referrals          Treatment Plan Information   OP COLORECTAL FOLFIRI + BEVACIZUMAB Q2W   Marah Santo MD   Upcoming Treatment Dates - OP COLORECTAL FOLFIRI + BEVACIZUMAB Q2W    5/11/2022       Pre-Medications       LORazepam injection 1 mg       promethazine (PHENERGAN)  6.25 mg in dextrose 5 % 100 mL IVPB       palonosetron 0.25mg/dexamethasone 20mg in NS IVPB       Chemotherapy       bevacizumab (AVASTIN) 600 mg in sodium chloride 0.9% 100 mL chemo infusion       irinotecan (CAMPTOSAR) 440 mg in sodium chloride 0.9% 500 mL chemo infusion       leucovorin calcium 400 mg/m2 = 1,010 mg in dextrose 5 % 250 mL infusion       fluorouraciL 2,400 mg/m2 = 6,050 mg in sodium chloride 0.9% 240 mL chemo infusion       Supportive Care       atropine injection 0.4 mg  5/25/2022       Pre-Medications       LORazepam injection 1 mg       promethazine (PHENERGAN) 6.25 mg in dextrose 5 % 100 mL IVPB       palonosetron 0.25mg/dexamethasone 20mg in NS IVPB       Chemotherapy       bevacizumab (AVASTIN) 600 mg in sodium chloride 0.9% 100 mL chemo infusion       irinotecan (CAMPTOSAR) 440 mg in sodium chloride 0.9% 500 mL chemo infusion       leucovorin calcium 400 mg/m2 = 1,010 mg in dextrose 5 % 250 mL infusion       fluorouraciL 2,400 mg/m2 = 6,050 mg in sodium chloride 0.9% 240 mL chemo infusion       Supportive Care       atropine injection 0.4 mg    Supportive Plan Information  IV FLUIDS AND ELECTROLYTES   Brayden Solo MD   Upcoming Treatment Dates - IV FLUIDS AND ELECTROLYTES    No upcoming days in selected categories.    Therapy Plan Information  Flushes  heparin, porcine (PF) 100 unit/mL injection flush 500 Units  500 Units, Intravenous, Every visit  sodium chloride 0.9% flush 10 mL  10 mL, Intravenous, Every visit    Answers for HPI/ROS submitted by the patient on 5/9/2022  appetite change : No  unexpected weight change: No  mouth sores: Yes  visual disturbance: No  adenopathy: No

## 2022-05-11 ENCOUNTER — INFUSION (OUTPATIENT)
Dept: INFUSION THERAPY | Facility: HOSPITAL | Age: 52
End: 2022-05-11
Attending: INTERNAL MEDICINE
Payer: COMMERCIAL

## 2022-05-11 VITALS
TEMPERATURE: 98 F | OXYGEN SATURATION: 97 % | SYSTOLIC BLOOD PRESSURE: 161 MMHG | RESPIRATION RATE: 16 BRPM | WEIGHT: 293 LBS | BODY MASS INDEX: 47.09 KG/M2 | HEIGHT: 66 IN | HEART RATE: 84 BPM | DIASTOLIC BLOOD PRESSURE: 84 MMHG

## 2022-05-11 DIAGNOSIS — C77.2 METASTASIS TO INTESTINAL LYMPH NODE: Primary | ICD-10-CM

## 2022-05-11 DIAGNOSIS — C18.7 MALIGNANT NEOPLASM OF SIGMOID COLON: ICD-10-CM

## 2022-05-11 LAB
FINAL PATHOLOGIC DIAGNOSIS: ABNORMAL
GROSS: ABNORMAL
SUPPLEMENTAL DIAGNOSIS: ABNORMAL

## 2022-05-11 PROCEDURE — 96368 THER/DIAG CONCURRENT INF: CPT | Mod: PN

## 2022-05-11 PROCEDURE — 96416 CHEMO PROLONG INFUSE W/PUMP: CPT | Mod: PN

## 2022-05-11 PROCEDURE — 96417 CHEMO IV INFUS EACH ADDL SEQ: CPT | Mod: PN

## 2022-05-11 PROCEDURE — 96375 TX/PRO/DX INJ NEW DRUG ADDON: CPT | Mod: PN

## 2022-05-11 PROCEDURE — 96413 CHEMO IV INFUSION 1 HR: CPT | Mod: PN

## 2022-05-11 PROCEDURE — 96415 CHEMO IV INFUSION ADDL HR: CPT | Mod: PN

## 2022-05-11 PROCEDURE — 96367 TX/PROPH/DG ADDL SEQ IV INF: CPT | Mod: PN

## 2022-05-11 PROCEDURE — 25000003 PHARM REV CODE 250: Mod: PN | Performed by: INTERNAL MEDICINE

## 2022-05-11 PROCEDURE — A4216 STERILE WATER/SALINE, 10 ML: HCPCS | Mod: PN | Performed by: INTERNAL MEDICINE

## 2022-05-11 PROCEDURE — 63600175 PHARM REV CODE 636 W HCPCS: Mod: PN | Performed by: INTERNAL MEDICINE

## 2022-05-11 RX ORDER — HEPARIN 100 UNIT/ML
500 SYRINGE INTRAVENOUS
Status: DISCONTINUED | OUTPATIENT
Start: 2022-05-11 | End: 2022-05-11 | Stop reason: HOSPADM

## 2022-05-11 RX ORDER — ATROPINE SULFATE 0.4 MG/ML
0.4 INJECTION, SOLUTION ENDOTRACHEAL; INTRAMEDULLARY; INTRAMUSCULAR; INTRAVENOUS; SUBCUTANEOUS ONCE AS NEEDED
Status: COMPLETED | OUTPATIENT
Start: 2022-05-11 | End: 2022-05-11

## 2022-05-11 RX ORDER — LORAZEPAM 2 MG/ML
1 INJECTION INTRAMUSCULAR
Status: COMPLETED | OUTPATIENT
Start: 2022-05-11 | End: 2022-05-11

## 2022-05-11 RX ORDER — SODIUM CHLORIDE 0.9 % (FLUSH) 0.9 %
10 SYRINGE (ML) INJECTION
Status: DISCONTINUED | OUTPATIENT
Start: 2022-05-11 | End: 2022-05-11 | Stop reason: HOSPADM

## 2022-05-11 RX ADMIN — FLUOROURACIL 6050 MG: 50 INJECTION, SOLUTION INTRAVENOUS at 04:05

## 2022-05-11 RX ADMIN — ATROPINE SULFATE 0.4 MG: 0.4 INJECTION, SOLUTION INTRAMUSCULAR; INTRAVENOUS; SUBCUTANEOUS at 02:05

## 2022-05-11 RX ADMIN — Medication 10 ML: at 01:05

## 2022-05-11 RX ADMIN — SODIUM CHLORIDE 440 MG: 0.9 INJECTION, SOLUTION INTRAVENOUS at 03:05

## 2022-05-11 RX ADMIN — LEUCOVORIN CALCIUM 1010 MG: 350 INJECTION, POWDER, LYOPHILIZED, FOR SOLUTION INTRAMUSCULAR; INTRAVENOUS at 03:05

## 2022-05-11 RX ADMIN — SODIUM CHLORIDE: 0.9 INJECTION, SOLUTION INTRAVENOUS at 01:05

## 2022-05-11 RX ADMIN — PALONOSETRON: 0.05 INJECTION, SOLUTION INTRAVENOUS at 02:05

## 2022-05-11 RX ADMIN — PROMETHAZINE HYDROCHLORIDE 6.25 MG: 25 INJECTION INTRAMUSCULAR; INTRAVENOUS at 01:05

## 2022-05-11 RX ADMIN — LORAZEPAM 1 MG: 2 INJECTION INTRAMUSCULAR; INTRAVENOUS at 03:05

## 2022-05-11 RX ADMIN — BEVACIZUMAB 600 MG: 400 INJECTION, SOLUTION INTRAVENOUS at 02:05

## 2022-05-13 ENCOUNTER — INFUSION (OUTPATIENT)
Dept: INFUSION THERAPY | Facility: HOSPITAL | Age: 52
End: 2022-05-13
Attending: INTERNAL MEDICINE
Payer: COMMERCIAL

## 2022-05-13 VITALS
WEIGHT: 293 LBS | HEIGHT: 66 IN | HEART RATE: 88 BPM | RESPIRATION RATE: 16 BRPM | BODY MASS INDEX: 47.09 KG/M2 | TEMPERATURE: 98 F | DIASTOLIC BLOOD PRESSURE: 81 MMHG | SYSTOLIC BLOOD PRESSURE: 128 MMHG

## 2022-05-13 DIAGNOSIS — C18.7 MALIGNANT NEOPLASM OF SIGMOID COLON: ICD-10-CM

## 2022-05-13 DIAGNOSIS — C77.2 METASTASIS TO INTESTINAL LYMPH NODE: Primary | ICD-10-CM

## 2022-05-13 PROCEDURE — 96523 IRRIG DRUG DELIVERY DEVICE: CPT | Mod: PN

## 2022-05-13 PROCEDURE — 63600175 PHARM REV CODE 636 W HCPCS: Mod: PN | Performed by: INTERNAL MEDICINE

## 2022-05-13 PROCEDURE — A4216 STERILE WATER/SALINE, 10 ML: HCPCS | Mod: PN | Performed by: INTERNAL MEDICINE

## 2022-05-13 PROCEDURE — 25000003 PHARM REV CODE 250: Mod: PN | Performed by: INTERNAL MEDICINE

## 2022-05-13 RX ORDER — HEPARIN 100 UNIT/ML
500 SYRINGE INTRAVENOUS
Status: DISCONTINUED | OUTPATIENT
Start: 2022-05-13 | End: 2022-05-13 | Stop reason: HOSPADM

## 2022-05-13 RX ORDER — SODIUM CHLORIDE 0.9 % (FLUSH) 0.9 %
10 SYRINGE (ML) INJECTION
Status: DISCONTINUED | OUTPATIENT
Start: 2022-05-13 | End: 2022-05-13 | Stop reason: HOSPADM

## 2022-05-13 RX ADMIN — HEPARIN 500 UNITS: 100 SYRINGE at 03:05

## 2022-05-13 RX ADMIN — Medication 10 ML: at 03:05

## 2022-05-13 NOTE — PLAN OF CARE
Problem: Adult Inpatient Plan of Care  Goal: Patient-Specific Goal (Individualized)  Outcome: Ongoing, Progressing  Flowsheets (Taken 5/13/2022 1525)  Anxieties, Fears or Concerns: none  Individualized Care Needs: recliner  Patient-Specific Goals (Include Timeframe): infection prevention during treatment     Problem: Fatigue  Goal: Improved Activity Tolerance  Intervention: Promote Improved Energy  Flowsheets (Taken 5/13/2022 1525)  Fatigue Management:   activity schedule adjusted   frequent rest breaks encouraged   paced activity encouraged   fatigue-related activity identified  Sleep/Rest Enhancement:   relaxation techniques promoted   regular sleep/rest pattern promoted   natural light exposure provided  Activity Management:   Ambulated -L4   Ambulated in reyes - L4   Up in chair - L3     Problem: Adult Inpatient Plan of Care  Goal: Plan of Care Review  Outcome: Ongoing, Progressing  Flowsheets (Taken 5/13/2022 1525)  Plan of Care Reviewed With: patient   Pt tolerated her 5FU home infusion well, PAC hep-locked as ordered, NAD. No new c/o voiced. Pt given a schedule and reviewed, pt verbalized understanding. Pt ambulated out of the clinic without difficulty.

## 2022-05-23 ENCOUNTER — TELEPHONE (OUTPATIENT)
Dept: INFUSION THERAPY | Facility: HOSPITAL | Age: 52
End: 2022-05-23
Payer: COMMERCIAL

## 2022-05-23 ENCOUNTER — PATIENT MESSAGE (OUTPATIENT)
Dept: HEMATOLOGY/ONCOLOGY | Facility: CLINIC | Age: 52
End: 2022-05-23
Payer: COMMERCIAL

## 2022-05-23 NOTE — TELEPHONE ENCOUNTER
Spoke with patient due to she canceled on my [facility] she will be going to [facility] on Wednesday and possible due surgery so she will need to be off of treatment for 6 weeks all appointments were canceled

## 2022-05-27 ENCOUNTER — DOCUMENTATION ONLY (OUTPATIENT)
Dept: INFUSION THERAPY | Facility: HOSPITAL | Age: 52
End: 2022-05-27
Payer: COMMERCIAL

## 2022-05-27 ENCOUNTER — PATIENT MESSAGE (OUTPATIENT)
Dept: HEMATOLOGY/ONCOLOGY | Facility: CLINIC | Age: 52
End: 2022-05-27
Payer: COMMERCIAL

## 2022-05-27 NOTE — PROGRESS NOTES
2:35 PM  This LCSW called this pt to remind her to bring her proof of income to her next visit.  The pt informed she will be here next week and is happy to provide it then.  The pt reported just getting back from [facility] [place] and said it went very well. The pt was in good spirits and reported no needs at this time.

## 2022-05-30 ENCOUNTER — OFFICE VISIT (OUTPATIENT)
Dept: HEMATOLOGY/ONCOLOGY | Facility: CLINIC | Age: 52
End: 2022-05-30
Payer: COMMERCIAL

## 2022-05-30 ENCOUNTER — LAB VISIT (OUTPATIENT)
Dept: LAB | Facility: HOSPITAL | Age: 52
End: 2022-05-30
Attending: INTERNAL MEDICINE
Payer: COMMERCIAL

## 2022-05-30 VITALS
RESPIRATION RATE: 16 BRPM | WEIGHT: 293 LBS | TEMPERATURE: 97 F | DIASTOLIC BLOOD PRESSURE: 82 MMHG | SYSTOLIC BLOOD PRESSURE: 132 MMHG | HEART RATE: 83 BPM | OXYGEN SATURATION: 96 % | BODY MASS INDEX: 47.09 KG/M2 | HEIGHT: 66 IN

## 2022-05-30 DIAGNOSIS — R11.0 NAUSEA: ICD-10-CM

## 2022-05-30 DIAGNOSIS — C18.7 MALIGNANT NEOPLASM OF SIGMOID COLON: ICD-10-CM

## 2022-05-30 DIAGNOSIS — C77.2 METASTASIS TO INTESTINAL LYMPH NODE: ICD-10-CM

## 2022-05-30 DIAGNOSIS — C77.9 SECONDARY ADENOCARCINOMA OF LYMPH NODE: ICD-10-CM

## 2022-05-30 DIAGNOSIS — C18.7 MALIGNANT NEOPLASM OF SIGMOID COLON: Primary | ICD-10-CM

## 2022-05-30 LAB
ALBUMIN SERPL BCP-MCNC: 3.6 G/DL (ref 3.5–5.2)
ALP SERPL-CCNC: 138 U/L (ref 55–135)
ALT SERPL W/O P-5'-P-CCNC: 130 U/L (ref 10–44)
ANION GAP SERPL CALC-SCNC: 7 MMOL/L (ref 8–16)
AST SERPL-CCNC: 108 U/L (ref 10–40)
BASOPHILS # BLD AUTO: 0.05 K/UL (ref 0–0.2)
BASOPHILS NFR BLD: 1.2 % (ref 0–1.9)
BILIRUB SERPL-MCNC: 0.4 MG/DL (ref 0.1–1)
BUN SERPL-MCNC: 13 MG/DL (ref 6–20)
CALCIUM SERPL-MCNC: 10.7 MG/DL (ref 8.7–10.5)
CHLORIDE SERPL-SCNC: 108 MMOL/L (ref 95–110)
CO2 SERPL-SCNC: 25 MMOL/L (ref 23–29)
CREAT SERPL-MCNC: 0.9 MG/DL (ref 0.5–1.4)
DIFFERENTIAL METHOD: ABNORMAL
EOSINOPHIL # BLD AUTO: 0.2 K/UL (ref 0–0.5)
EOSINOPHIL NFR BLD: 5.5 % (ref 0–8)
ERYTHROCYTE [DISTWIDTH] IN BLOOD BY AUTOMATED COUNT: 15.4 % (ref 11.5–14.5)
EST. GFR  (AFRICAN AMERICAN): >60 ML/MIN/1.73 M^2
EST. GFR  (NON AFRICAN AMERICAN): >60 ML/MIN/1.73 M^2
FINAL PATHOLOGIC DIAGNOSIS: NORMAL
GLUCOSE SERPL-MCNC: 103 MG/DL (ref 70–110)
GROSS: NORMAL
HCT VFR BLD AUTO: 45.1 % (ref 37–48.5)
HGB BLD-MCNC: 14.6 G/DL (ref 12–16)
IMM GRANULOCYTES # BLD AUTO: 0.01 K/UL (ref 0–0.04)
IMM GRANULOCYTES NFR BLD AUTO: 0.2 % (ref 0–0.5)
LYMPHOCYTES # BLD AUTO: 1.5 K/UL (ref 1–4.8)
LYMPHOCYTES NFR BLD: 33.5 % (ref 18–48)
MCH RBC QN AUTO: 30.7 PG (ref 27–31)
MCHC RBC AUTO-ENTMCNC: 32.4 G/DL (ref 32–36)
MCV RBC AUTO: 95 FL (ref 82–98)
MONOCYTES # BLD AUTO: 0.6 K/UL (ref 0.3–1)
MONOCYTES NFR BLD: 14.5 % (ref 4–15)
NEUTROPHILS # BLD AUTO: 2 K/UL (ref 1.8–7.7)
NEUTROPHILS NFR BLD: 45.1 % (ref 38–73)
NRBC BLD-RTO: 0 /100 WBC
PLATELET # BLD AUTO: 233 K/UL (ref 150–450)
PMV BLD AUTO: 10.2 FL (ref 9.2–12.9)
POTASSIUM SERPL-SCNC: 4.4 MMOL/L (ref 3.5–5.1)
PROT SERPL-MCNC: 7 G/DL (ref 6–8.4)
RBC # BLD AUTO: 4.75 M/UL (ref 4–5.4)
SODIUM SERPL-SCNC: 140 MMOL/L (ref 136–145)
SUPPLEMENTAL DIAGNOSIS: NORMAL
WBC # BLD AUTO: 4.33 K/UL (ref 3.9–12.7)

## 2022-05-30 PROCEDURE — 3075F PR MOST RECENT SYSTOLIC BLOOD PRESS GE 130-139MM HG: ICD-10-PCS | Mod: CPTII,S$GLB,, | Performed by: INTERNAL MEDICINE

## 2022-05-30 PROCEDURE — 1159F MED LIST DOCD IN RCRD: CPT | Mod: CPTII,S$GLB,, | Performed by: INTERNAL MEDICINE

## 2022-05-30 PROCEDURE — 3075F SYST BP GE 130 - 139MM HG: CPT | Mod: CPTII,S$GLB,, | Performed by: INTERNAL MEDICINE

## 2022-05-30 PROCEDURE — 3079F DIAST BP 80-89 MM HG: CPT | Mod: CPTII,S$GLB,, | Performed by: INTERNAL MEDICINE

## 2022-05-30 PROCEDURE — 85025 COMPLETE CBC W/AUTO DIFF WBC: CPT | Mod: PN | Performed by: INTERNAL MEDICINE

## 2022-05-30 PROCEDURE — 99215 OFFICE O/P EST HI 40 MIN: CPT | Mod: S$GLB,,, | Performed by: INTERNAL MEDICINE

## 2022-05-30 PROCEDURE — 99999 PR PBB SHADOW E&M-EST. PATIENT-LVL V: ICD-10-PCS | Mod: PBBFAC,,, | Performed by: INTERNAL MEDICINE

## 2022-05-30 PROCEDURE — 1159F PR MEDICATION LIST DOCUMENTED IN MEDICAL RECORD: ICD-10-PCS | Mod: CPTII,S$GLB,, | Performed by: INTERNAL MEDICINE

## 2022-05-30 PROCEDURE — 80053 COMPREHEN METABOLIC PANEL: CPT | Mod: PN | Performed by: INTERNAL MEDICINE

## 2022-05-30 PROCEDURE — 3079F PR MOST RECENT DIASTOLIC BLOOD PRESSURE 80-89 MM HG: ICD-10-PCS | Mod: CPTII,S$GLB,, | Performed by: INTERNAL MEDICINE

## 2022-05-30 PROCEDURE — 99999 PR PBB SHADOW E&M-EST. PATIENT-LVL V: CPT | Mod: PBBFAC,,, | Performed by: INTERNAL MEDICINE

## 2022-05-30 PROCEDURE — 36415 COLL VENOUS BLD VENIPUNCTURE: CPT | Mod: PN | Performed by: INTERNAL MEDICINE

## 2022-05-30 PROCEDURE — 3008F BODY MASS INDEX DOCD: CPT | Mod: CPTII,S$GLB,, | Performed by: INTERNAL MEDICINE

## 2022-05-30 PROCEDURE — 3008F PR BODY MASS INDEX (BMI) DOCUMENTED: ICD-10-PCS | Mod: CPTII,S$GLB,, | Performed by: INTERNAL MEDICINE

## 2022-05-30 PROCEDURE — 99215 PR OFFICE/OUTPT VISIT, EST, LEVL V, 40-54 MIN: ICD-10-PCS | Mod: S$GLB,,, | Performed by: INTERNAL MEDICINE

## 2022-05-30 RX ORDER — ATROPINE SULFATE 0.4 MG/ML
0.4 INJECTION, SOLUTION ENDOTRACHEAL; INTRAMEDULLARY; INTRAMUSCULAR; INTRAVENOUS; SUBCUTANEOUS ONCE AS NEEDED
Status: CANCELLED | OUTPATIENT
Start: 2022-06-01

## 2022-05-30 RX ORDER — HEPARIN 100 UNIT/ML
500 SYRINGE INTRAVENOUS
Status: CANCELLED | OUTPATIENT
Start: 2022-06-03

## 2022-05-30 RX ORDER — SODIUM CHLORIDE 0.9 % (FLUSH) 0.9 %
10 SYRINGE (ML) INJECTION
Status: CANCELLED | OUTPATIENT
Start: 2022-06-03

## 2022-05-30 RX ORDER — LORAZEPAM 1 MG/1
1 TABLET ORAL EVERY 12 HOURS PRN
Qty: 30 TABLET | Refills: 0 | Status: SHIPPED | OUTPATIENT
Start: 2022-05-30 | End: 2022-10-18 | Stop reason: SDUPTHER

## 2022-05-30 RX ORDER — ONDANSETRON 8 MG/1
8 TABLET, ORALLY DISINTEGRATING ORAL EVERY 8 HOURS PRN
Qty: 60 TABLET | Refills: 3 | Status: ON HOLD | OUTPATIENT
Start: 2022-05-30 | End: 2023-05-12 | Stop reason: SDUPTHER

## 2022-05-30 RX ORDER — LORAZEPAM 2 MG/ML
1 INJECTION INTRAMUSCULAR
Status: CANCELLED | OUTPATIENT
Start: 2022-06-01 | End: 2022-06-01

## 2022-05-30 RX ORDER — HEPARIN 100 UNIT/ML
500 SYRINGE INTRAVENOUS
Status: CANCELLED | OUTPATIENT
Start: 2022-06-01

## 2022-05-30 RX ORDER — SODIUM CHLORIDE 0.9 % (FLUSH) 0.9 %
10 SYRINGE (ML) INJECTION
Status: CANCELLED | OUTPATIENT
Start: 2022-06-01

## 2022-05-30 NOTE — PROGRESS NOTES
PROGRESS NOTE    Subjective:       Patient ID: Gail Mckinnon is a 52 y.o. female.  MRN: [id number]  : 1970    Chief Complaint: Malignant neoplasm of sigmoid colon      History of Present Illness:   Gail Mckinnon is a 52 y.o. female who presents with  colon cancer, initially stage III and now with LN recurrence.       She completed adjuvant FOLFOX in 2020. She tolerated only 4 cycles of FOLFOX before she developed an infusion reaction to oxaliplatin in cycle 5 was well as cycle 6. She then completed 6 cycles of infusional 5 FU.     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 She is now on second line therapy with FOLFIRI and Avastin.      She presented to the ED on  with left flank pain. CT renal stone study showed Moderate hydronephrosis on the left secondary to 3 mm calculus at the UPJ.     Interim history:  She presents to discuss symptoms, labs,and further recommendations.   She had a PET scan mid April that showed possible progression of her disease with enlarging lymphadenopahty from 1.7cm to 1.9 cm, no new lesion.     For now, we have continued FOLFIRI+ Avastin with plan to repeat short term imaging. She has been to [facility] for another opinion. No change was recommended in systemic therapy but she was called back to discuss surgical options.     She saw surgical oncology on  and they recommend surgical removal of the aorta caval nodes.  Recent sans at [facility]  show stable disease.    Surgery due 22. We will give 2 more cycle of FOLFIRI without Avastin.       She reports a stable weight, appetite and performance status.        She reports worsening joint pains.     Oncology History:  Oncology History   Malignant neoplasm of sigmoid colon   3/16/2020 Initial Diagnosis    Malignant neoplasm of sigmoid colon     3/31/2020 Cancer Staged    Staging form: Colon and Rectum, AJCC 8th Edition  -  Clinical stage from 3/31/2020: Stage IIIC (cT4b, cN2a, cM0)     5/6/2020 - 11/17/2020 Chemotherapy    Treatment Summary   Plan Name: OP FOLFOX 6 Q2W  Treatment Goal: Curative  Status: Inactive  Start Date: 5/6/2020  End Date: 11/6/2020  Provider: Dylan Leyva MD  Chemotherapy: fluorouraciL injection 945 mg, 400 mg/m2 = 945 mg, Intravenous, Clinic/[facility] 1 time, 14 of 14 cycles  Administration: 945 mg (5/6/2020), 945 mg (5/20/2020), 945 mg (6/3/2020), 945 mg (6/17/2020), 945 mg (7/29/2020), 945 mg (8/12/2020), 945 mg (8/26/2020), 945 mg (9/9/2020), 945 mg (9/23/2020), 945 mg (10/6/2020), 945 mg (10/21/2020), 945 mg (11/4/2020)  fluorouraciL 2,400 mg/m2 = 5,665 mg in sodium chloride 0.9% 240 mL chemo infusion, 2,400 mg/m2 = 5,665 mg, Intravenous, Over 46 hours, 14 of 14 cycles  Administration: 5,665 mg (5/6/2020), 5,665 mg (5/20/2020), 5,665 mg (6/3/2020), 5,665 mg (6/17/2020), 5,665 mg (7/29/2020), 5,665 mg (8/12/2020), 5,665 mg (8/26/2020), 5,665 mg (9/9/2020), 5,665 mg (9/23/2020), 5,665 mg (10/6/2020), 5,665 mg (10/21/2020), 5,665 mg (11/4/2020)  leucovorin calcium 900 mg in dextrose 5 % 250 mL infusion, 945 mg, Intravenous, Clinic/[facility] 1 time, 14 of 14 cycles  Administration: 900 mg (5/6/2020), 900 mg (5/20/2020), 900 mg (6/3/2020), 900 mg (6/17/2020), 945 mg (6/30/2020), 945 mg (7/20/2020), 945 mg (7/29/2020), 945 mg (8/12/2020), 945 mg (8/26/2020), 945 mg (9/9/2020), 945 mg (9/23/2020), 945 mg (10/6/2020), 945 mg (10/21/2020), 945 mg (11/4/2020)  oxaliplatin (ELOXATIN) 200 mg in dextrose 5 % 500 mL chemo infusion, 201 mg, Intravenous, Clinic/[facility] 1 time, 6 of 6 cycles  Dose modification: 65 mg/m2 (original dose 85 mg/m2, Cycle 6)  Administration: 200 mg (5/6/2020), 200 mg (5/20/2020), 200 mg (6/3/2020), 200 mg (6/17/2020), 201 mg (6/30/2020), 150 mg (7/20/2020)     6/16/2021 -  Chemotherapy    Treatment Summary   Plan Name: OP COLORECTAL FOLFIRI + BEVACIZUMAB Q2W  Treatment Goal: Control  Status: Active  Start  Date: 6/16/2021  End Date: 6/17/2022 (Planned)  Provider: Marah Santo MD  Chemotherapy: fluorouraciL injection 990 mg, 400 mg/m2 = 990 mg, Intravenous, [facility] 1 time, 15 of 15 cycles  Administration: 990 mg (6/16/2021), 990 mg (6/30/2021), 990 mg (7/14/2021), 980 mg (7/28/2021), 990 mg (8/11/2021), 990 mg (8/25/2021), 990 mg (9/8/2021), 990 mg (9/22/2021), 990 mg (10/6/2021), 990 mg (10/20/2021), 990 mg (11/3/2021), 990 mg (12/1/2021), 990 mg (12/15/2021), 990 mg (11/17/2021), 990 mg (12/28/2021)  fluorouraciL 2,400 mg/m2 = 5,950 mg in sodium chloride 0.9% 240 mL chemo infusion, 2,400 mg/m2 = 5,950 mg, Intravenous, Over 46 hours, 23 of 25 cycles  Administration: 5,950 mg (6/16/2021), 5,950 mg (6/30/2021), 5,950 mg (7/14/2021), 5,880 mg (7/28/2021), 5,930 mg (8/11/2021), 5,930 mg (8/25/2021), 5,930 mg (9/8/2021), 5,930 mg (9/22/2021), 5,930 mg (10/6/2021), 5,930 mg (10/20/2021), 5,930 mg (11/3/2021), 5,930 mg (12/1/2021), 5,930 mg (12/15/2021), 5,930 mg (11/17/2021), 5,930 mg (12/28/2021), 6,050 mg (5/11/2022), 6,025 mg (3/9/2022), 6,025 mg (2/23/2022), 5,930 mg (2/2/2022), 5,930 mg (1/19/2022), 6,050 mg (4/20/2022), 6,025 mg (4/6/2022), 6,025 mg (3/23/2022)  bevacizumab (AVASTIN) 600 mg in sodium chloride 0.9% 100 mL chemo infusion, 660 mg, Intravenous, Clinic/H[facility] 1 time, 23 of 23 cycles  Administration: 600 mg (6/30/2021), 600 mg (7/14/2021), 600 mg (7/28/2021), 600 mg (6/16/2021), 600 mg (8/11/2021), 655 mg (8/25/2021), 600 mg (9/8/2021), 600 mg (9/22/2021), 600 mg (10/6/2021), 600 mg (10/20/2021), 600 mg (11/3/2021), 655 mg (12/1/2021), 600 mg (12/15/2021), 600 mg (11/17/2021), 600 mg (12/28/2021), 600 mg (5/11/2022), 600 mg (3/9/2022), 600 mg (2/23/2022), 600 mg (2/2/2022), 600 mg (1/19/2022), 600 mg (4/20/2022), 680 mg (4/6/2022), 600 mg (3/23/2022)  irinotecan (CAMPTOSAR) 440 mg in sodium chloride 0.9% 500 mL chemo infusion, 446 mg, Intravenous, Clinic/[facility] 1 time, 23 of 25 cycles  Dose modification:  180 mg/m2 (original dose 180 mg/m2, Cycle 24)  Administration: 440 mg (6/16/2021), 440 mg (6/30/2021), 440 mg (7/14/2021), 440 mg (7/28/2021), 440 mg (8/11/2021), 444 mg (8/25/2021), 440 mg (9/8/2021), 440 mg (9/22/2021), 440 mg (10/6/2021), 440 mg (10/20/2021), 440 mg (11/3/2021), 440 mg (12/1/2021), 440 mg (12/15/2021), 440 mg (11/17/2021), 440 mg (12/28/2021), 440 mg (5/11/2022), 440 mg (3/9/2022), 440 mg (2/23/2022), 440 mg (2/2/2022), 440 mg (1/19/2022), 440 mg (4/20/2022), 440 mg (4/6/2022), 440 mg (3/24/2022)  leucovorin calcium 400 mg/m2 = 990 mg in dextrose 5 % 250 mL infusion, 400 mg/m2 = 990 mg, Intravenous, Clinic/[facility] 1 time, 23 of 25 cycles  Dose modification: 400 mg/m2 (original dose 400 mg/m2, Cycle 24)  Administration: 990 mg (6/16/2021), 990 mg (6/30/2021), 990 mg (7/14/2021), 980 mg (7/28/2021), 990 mg (8/11/2021), 990 mg (8/25/2021), 990 mg (9/8/2021), 990 mg (9/22/2021), 990 mg (10/6/2021), 990 mg (10/20/2021), 990 mg (11/3/2021), 990 mg (12/1/2021), 990 mg (12/15/2021), 990 mg (11/17/2021), 990 mg (12/28/2021), 1,010 mg (5/11/2022), 1,000 mg (3/9/2022), 1,000 mg (2/23/2022), 990 mg (2/2/2022), 990 mg (1/19/2022), 1,000 mg (4/20/2022), 1,000 mg (4/6/2022), 1,000 mg (3/24/2022)     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   4/3/2020 Initial Diagnosis    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     5/6/2020 - 11/17/2020 Chemotherapy    Treatment Summary   Plan Name: OP FOLFOX 6 Q2W  Treatment Goal: Curative  Status: Inactive  Start Date: 5/6/2020  End Date: 11/6/2020  Provider: Dylan Leyva MD  Chemotherapy: fluorouraciL injection 945 mg, 400 mg/m2 = 945 mg, Intravenous, Clinic/[facility] 1 time, 14 of 14 cycles  Administration: 945 mg (5/6/2020), 945 mg (5/20/2020), 945 mg (6/3/2020), 945 mg (6/17/2020), 945 mg (7/29/2020), 945 mg (8/12/2020), 945 mg (8/26/2020), 945 mg (9/9/2020), 945 mg (9/23/2020), 945 mg (10/6/2020), 945 mg (10/21/2020), 945 mg (11/4/2020)  fluorouraciL 2,400 mg/m2 = 5,665 mg in sodium  chloride 0.9% 240 mL chemo infusion, 2,400 mg/m2 = 5,665 mg, Intravenous, Over 46 hours, 14 of 14 cycles  Administration: 5,665 mg (5/6/2020), 5,665 mg (5/20/2020), 5,665 mg (6/3/2020), 5,665 mg (6/17/2020), 5,665 mg (7/29/2020), 5,665 mg (8/12/2020), 5,665 mg (8/26/2020), 5,665 mg (9/9/2020), 5,665 mg (9/23/2020), 5,665 mg (10/6/2020), 5,665 mg (10/21/2020), 5,665 mg (11/4/2020)  leucovorin calcium 900 mg in dextrose 5 % 250 mL infusion, 945 mg, Intravenous, Clinic/[facility] 1 time, 14 of 14 cycles  Administration: 900 mg (5/6/2020), 900 mg (5/20/2020), 900 mg (6/3/2020), 900 mg (6/17/2020), 945 mg (6/30/2020), 945 mg (7/20/2020), 945 mg (7/29/2020), 945 mg (8/12/2020), 945 mg (8/26/2020), 945 mg (9/9/2020), 945 mg (9/23/2020), 945 mg (10/6/2020), 945 mg (10/21/2020), 945 mg (11/4/2020)  oxaliplatin (ELOXATIN) 200 mg in dextrose 5 % 500 mL chemo infusion, 201 mg, Intravenous, Clinic/[facility] 1 time, 6 of 6 cycles  Dose modification: 65 mg/m2 (original dose 85 mg/m2, Cycle 6)  Administration: 200 mg (5/6/2020), 200 mg (5/20/2020), 200 mg (6/3/2020), 200 mg (6/17/2020), 201 mg (6/30/2020), 150 mg (7/20/2020)     6/16/2021 -  Chemotherapy    Treatment Summary   Plan Name: OP COLORECTAL FOLFIRI + BEVACIZUMAB Q2W  Treatment Goal: Control  Status: Active  Start Date: 6/16/2021  End Date: 6/17/2022 (Planned)  Provider: Marah Santo MD  Chemotherapy: fluorouraciL injection 990 mg, 400 mg/m2 = 990 mg, Intravenous, Clinic/\[facility]\"" 1 time, 15 of 15 cycles  Administration: 990 mg (6/16/2021), 990 mg (6/30/2021), 990 mg (7/14/2021), 980 mg (7/28/2021), 990 mg (8/11/2021), 990 mg (8/25/2021), 990 mg (9/8/2021), 990 mg (9/22/2021), 990 mg (10/6/2021), 990 mg (10/20/2021), 990 mg (11/3/2021), 990 mg (12/1/2021), 990 mg (12/15/2021), 990 mg (11/17/2021), 990 mg (12/28/2021)  fluorouraciL 2,400 mg/m2 = 5,950 mg in sodium chloride 0.9% 240 mL chemo infusion, 2,400 mg/m2 = 5,950 mg, Intravenous, Over 46 hours, 23 of 25 cycles  Administration:  5,950 mg (6/16/2021), 5,950 mg (6/30/2021), 5,950 mg (7/14/2021), 5,880 mg (7/28/2021), 5,930 mg (8/11/2021), 5,930 mg (8/25/2021), 5,930 mg (9/8/2021), 5,930 mg (9/22/2021), 5,930 mg (10/6/2021), 5,930 mg (10/20/2021), 5,930 mg (11/3/2021), 5,930 mg (12/1/2021), 5,930 mg (12/15/2021), 5,930 mg (11/17/2021), 5,930 mg (12/28/2021), 6,050 mg (5/11/2022), 6,025 mg (3/9/2022), 6,025 mg (2/23/2022), 5,930 mg (2/2/2022), 5,930 mg (1/19/2022), 6,050 mg (4/20/2022), 6,025 mg (4/6/2022), 6,025 mg (3/23/2022)  bevacizumab (AVASTIN) 600 mg in sodium chloride 0.9% 100 mL chemo infusion, 660 mg, Intravenous, Clinic/[facility] 1 time, 23 of 23 cycles  Administration: 600 mg (6/30/2021), 600 mg (7/14/2021), 600 mg (7/28/2021), 600 mg (6/16/2021), 600 mg (8/11/2021), 655 mg (8/25/2021), 600 mg (9/8/2021), 600 mg (9/22/2021), 600 mg (10/6/2021), 600 mg (10/20/2021), 600 mg (11/3/2021), 655 mg (12/1/2021), 600 mg (12/15/2021), 600 mg (11/17/2021), 600 mg (12/28/2021), 600 mg (5/11/2022), 600 mg (3/9/2022), 600 mg (2/23/2022), 600 mg (2/2/2022), 600 mg (1/19/2022), 600 mg (4/20/2022), 680 mg (4/6/2022), 600 mg (3/23/2022)  irinotecan (CAMPTOSAR) 440 mg in sodium chloride 0.9% 500 mL chemo infusion, 446 mg, Intravenous, Clinic/[facility] 1 time, 23 of 25 cycles  Dose modification: 180 mg/m2 (original dose 180 mg/m2, Cycle 24)  Administration: 440 mg (6/16/2021), 440 mg (6/30/2021), 440 mg (7/14/2021), 440 mg (7/28/2021), 440 mg (8/11/2021), 444 mg (8/25/2021), 440 mg (9/8/2021), 440 mg (9/22/2021), 440 mg (10/6/2021), 440 mg (10/20/2021), 440 mg (11/3/2021), 440 mg (12/1/2021), 440 mg (12/15/2021), 440 mg (11/17/2021), 440 mg (12/28/2021), 440 mg (5/11/2022), 440 mg (3/9/2022), 440 mg (2/23/2022), 440 mg (2/2/2022), 440 mg (1/19/2022), 440 mg (4/20/2022), 440 mg (4/6/2022), 440 mg (3/24/2022)  leucovorin calcium 400 mg/m2 = 990 mg in dextrose 5 % 250 mL infusion, 400 mg/m2 = 990 mg, Intravenous, Clinic/HOD 1 time, 23 of 25 cycles  Dose modification:  400 mg/m2 (original dose 400 mg/m2, Cycle 24)  Administration: 990 mg (2021), 990 mg (2021), 990 mg (2021), 980 mg (2021), 990 mg (2021), 990 mg (2021), 990 mg (2021), 990 mg (2021), 990 mg (10/6/2021), 990 mg (10/20/2021), 990 mg (11/3/2021), 990 mg (2021), 990 mg (12/15/2021), 990 mg (2021), 990 mg (2021), 1,010 mg (2022), 1,000 mg (3/9/2022), 1,000 mg (2022), 990 mg (2022), 990 mg (2022), 1,000 mg (2022), 1,000 mg (2022), 1,000 mg (3/24/2022)         History:  Past Medical History:   Diagnosis Date    Anxiety     Depression     FH: ovarian cancer 3/16/2020    Hx of psychiatric care     Effexor, Paxil, Lexapro, Zoloft, Wellbutrin, Trazodone Buspar    Hyperthyroidism     Hypothyroid     Kidney calculi     Malignant neoplasm of sigmoid colon 3/16/2020    Menorrhagia     Multinodular goiter 2012    Palpitation     Psychiatric problem     Venous insufficiency       Past Surgical History:   Procedure Laterality Date     SECTION, CLASSIC      x3    COLONOSCOPY N/A 2020    Procedure: COLONOSCOPY;  Surgeon: Shane Parker MD;  Location: [facility];  Service: Endoscopy;  Laterality: N/A;    CYSTOSCOPY W/ URETERAL STENT PLACEMENT Bilateral 3/25/2020    Procedure: CYSTOSCOPY, WITH URETERAL STENT INSERTION;  Surgeon: Claudio Tyson MD;  Location: [address];  Service: Urology;  Laterality: Bilateral;    INSERTION OF TUNNELED CENTRAL VENOUS CATHETER (CVC) WITH SUBCUTANEOUS PORT N/A 2020    Procedure: [id number];  Surgeon: Stephen Diagnostic Provider;  Location: [facility] OR 2ND FLR;  Service: Radiology;  Laterality: N/A;  Room 189/[facility]    LAPAROSCOPIC SIGMOIDECTOMY N/A 3/25/2020    Procedure: COLECTOMY, SIGMOID, LAPAROSCOPIC, flex sig, ERAS high;  Surgeon: Silvio Man MD;  Location: [facility] OR 2ND FLR;  Service: Colon and Rectal;  Laterality: N/A;    MOBILIZATION OF SPLENIC  FLEXURE  3/25/2020    Procedure: MOBILIZATION, SPLENIC FLEXURE;  Surgeon: Silvio Man MD;  Location: [facility] OR 2ND FLR;  Service: Colon and Rectal;;    tonsillectomy      TOTAL ABDOMINAL HYSTERECTOMY W/ BILATERAL SALPINGOOPHORECTOMY N/A 3/25/2020    Procedure: HYSTERECTOMY, TOTAL, ABDOMINAL, WITH BILATERAL SALPINGO-OOPHORECTOMY;  Surgeon: Keron Brady MD;  Location: [facility] OR 2ND FLR;  Service: Oncology;  Laterality: N/A;     Family History   Problem Relation Age of Onset    Heart disease Father     Diabetes Mother 65    Drug abuse Brother     Drug abuse Brother     Cancer Maternal Aunt         lung cancer    Cancer Maternal Grandmother         stomach cancer- started in ovaries    Ovarian cancer Maternal Grandmother         stomach cancer- started in ovaries    Colon cancer Paternal Uncle     Cancer Paternal Uncle     Ovarian cancer Maternal Aunt     Ovarian cancer Maternal Aunt     Ovarian cancer Cousin         mother had ovarian cancer    Drug abuse Son     Drug abuse Son         clean/sober since 2012    Colon cancer Son     No Known Problems Daughter     Uterine cancer Neg Hx     Breast cancer Neg Hx      Social History     Tobacco Use    Smoking status: Never Smoker    Smokeless tobacco: Never Used   Substance and Sexual Activity    Alcohol use: Yes     Comment: occasionally- twice monthly    Drug use: Never    Sexual activity: Yes     Partners: Male     Birth control/protection: See Surgical Hx        ROS:   Review of Systems   Constitutional: Positive for malaise/fatigue. Negative for fever and weight loss.   HENT: Negative for congestion, hearing loss, nosebleeds and sore throat.    Eyes: Negative for double vision and photophobia.   Respiratory: Negative for cough, hemoptysis, sputum production, shortness of breath and wheezing.    Cardiovascular: Negative for chest pain, palpitations, orthopnea and leg swelling.   Gastrointestinal: Positive for nausea. Negative for  "abdominal pain, blood in stool, constipation, diarrhea, heartburn and vomiting.   Genitourinary: Negative for dysuria, frequency, hematuria and urgency.   Musculoskeletal: Positive for back pain and joint pain. Negative for myalgias.   Skin: Negative for itching and rash.   Neurological: Positive for headaches. Negative for dizziness, tingling, seizures and weakness.   Endo/Heme/Allergies: Negative for polydipsia. Does not bruise/bleed easily.   Psychiatric/Behavioral: Negative for depression and memory loss. The patient is nervous/anxious. The patient does not have insomnia.         Objective:     Vitals:    05/30/22 1449   BP: 132/82   Pulse: 83   Resp: 16   Temp: 97.3 °F (36.3 °C)   SpO2: 96%   Weight: 135.9 kg (299 lb 9.7 oz)   Height: 5' 6" (1.676 m)   PainSc: 0-No pain     Wt Readings from Last 10 Encounters:   05/30/22 135.9 kg (299 lb 9.7 oz)   05/13/22 (!) 136.6 kg (301 lb 2.4 oz)   05/11/22 (!) 136.6 kg (301 lb 2.4 oz)   05/09/22 (!) 136.6 kg (301 lb 2.4 oz)   04/20/22 (!) 136.2 kg (300 lb 4.3 oz)   04/20/22 (!) 136.2 kg (300 lb 4.3 oz)   04/08/22 135.7 kg (299 lb 2.6 oz)   04/06/22 135.7 kg (299 lb 2.6 oz)   04/04/22 135.7 kg (299 lb 2.6 oz)   03/24/22 134.1 kg (295 lb 10.2 oz)       Physical Examination:   Physical Exam  Vitals and nursing note reviewed.   Constitutional:       General: She is not in acute distress.     Appearance: She is not diaphoretic.   HENT:      Head: Normocephalic.      Mouth/Throat:      Pharynx: No oropharyngeal exudate.   Eyes:      General: No scleral icterus.     Conjunctiva/sclera: Conjunctivae normal.   Neck:      Thyroid: No thyromegaly.   Cardiovascular:      Rate and Rhythm: Normal rate and regular rhythm.      Heart sounds: Normal heart sounds. No murmur heard.  Pulmonary:      Effort: Pulmonary effort is normal. No respiratory distress.      Breath sounds: No stridor. No wheezing or rales.   Chest:      Chest wall: No tenderness.   Abdominal:      General: Bowel sounds " are normal. There is no distension.      Palpations: Abdomen is soft. There is no mass.      Tenderness: There is no abdominal tenderness. There is no rebound.   Musculoskeletal:         General: No tenderness or deformity. Normal range of motion.      Cervical back: Neck supple.   Lymphadenopathy:      Cervical: No cervical adenopathy.   Skin:     General: Skin is warm and dry.      Findings: No erythema or rash.   Neurological:      Mental Status: She is alert and oriented to person, place, and time.      Cranial Nerves: No cranial nerve deficit.      Coordination: Coordination normal.      Gait: Gait is intact.   Psychiatric:         Mood and Affect: Affect normal.         Cognition and Memory: Memory normal.         Judgment: Judgment normal.          Diagnostic Tests:  Significant Imaging: I have reviewed and interpreted all pertinent imaging results/findings.    Laboratory Data:  All pertinent labs have been reviewed.  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 Oxaliplatin was stopped due to severe adverse reaction.     Follow-up CTs and PET in May 2021 showed enlarging retroperitoneal node, concerning for metastatic disease.      She is tolerating FOLFIRI + Avastin well. Most recent PET scan showed slight enlargement of one lymph node, does not meet  criteria for disease progression.     [facility] records and recommendations are reviewed.   She has had repeat imaging at [facility], stable disease and is deemed a surgical candidate. Will continue FOLFIRI, hold Avastin and plan further treatments based on post op course and follow up imaging. Continue to co ordinate care with [facility] as needed.    Nausea  -     ondansetron (ZOFRAN-ODT) 8 MG TbDL; every 8 (eight) hours as needed.  Dispense: 60 tablet; Refill: 3  -     granisetron (SANCUSO) 3.1 mg/24 hour; Place 1 patch (3.1 mg total) onto the skin once. for 1 dose  Dispense: 4 patch; Refill: 3  -     LORazepam (ATIVAN) 1 MG tablet; Take 1 tablet (1 mg total) by mouth every 12 (twelve) hours as needed for Anxiety (nausea).  Dispense: 30 tablet; Refill: 0       Anxiety  Continue supportive care, is on Wellbutrin. She will follow up with Dr. Palm.     Fatty liver  LFTs recently rising compared to baseline. No dose adjustment is needed at this time, bilirubin is normal. She has been referred to Hepatology for further evaluation.        Other orders  -     LORazepam injection 1 mg  -     promethazine (PHENERGAN) 6.25 mg in dextrose 5 % 100 mL IVPB  -     palonosetron 0.25mg/dexamethasone 20mg in NS IVPB  -     irinotecan (CAMPTOSAR) 180 mg/m2 = 454 mg in sodium chloride 0.9% 500 mL chemo infusion  -     leucovorin calcium 400 mg/m2 = 1,010 mg in dextrose 5 % 250 mL infusion  -     fluorouracil (ADRUCIL) 2,400 mg/m2 = 6,050 mg in sodium chloride 0.9% 361 mL chemo infusion  -     atropine injection 0.4 mg  -     sodium chloride 0.9% 250 mL flush bag  -     sodium chloride 0.9% flush 10 mL  -     heparin, porcine (PF) 100 unit/mL injection flush 500 Units  -     alteplase injection 2 mg  -     sodium chloride 0.9% flush 10 mL  -     heparin, porcine (PF) 100 unit/mL injection flush 500 Units  -     alteplase injection 2 mg       ECOG SCORE    1 - Restricted in strenuous activity-ambulatory and able to carry out work of a light nature            Discussion:   No follow-ups on file.    Plan was discussed with the patient at length, and she verbalized understanding. Gail was given an opportunity to ask questions that were answered to her satisfaction, and she was advised to call in the interval if any problems or questions arise.    Electronically signed by Marah Santo MD          Answers for HPI/ROS submitted by the patient on 5/27/2022  appetite change : No  unexpected weight change: No  mouth sores: Yes  visual disturbance: No  cough: No  shortness of breath: No  chest pain: No  abdominal pain: No  diarrhea: No  frequency: No  back pain: Yes  rash: No  headaches: Yes  adenopathy: No  nervous/ anxious: Yes    Route Chart for Scheduling    Med Onc Chart Routing      Follow up with physician 2 weeks. 6/13 with labs and for chemo    Follow up with TAVO    Labs CMP and CBC   Lab interval: every 2 weeks  Labs today and on 6/13   Imaging    Pharmacy appointment    Other referrals          Treatment Plan Information   OP COLORECTAL FOLFIRI + BEVACIZUMAB Q2W   Marah Santo MD   Upcoming Treatment Dates - OP COLORECTAL FOLFIRI + BEVACIZUMAB Q2W    6/1/2022       Pre-Medications       LORazepam injection 1 mg       promethazine (PHENERGAN) 6.25 mg in dextrose 5 % 100 mL IVPB       palonosetron 0.25mg/dexamethasone 20mg in NS IVPB       Chemotherapy       irinotecan (CAMPTOSAR) 180 mg/m2 = 454 mg in sodium chloride 0.9% 500 mL chemo infusion       leucovorin calcium 400 mg/m2 = 1,010 mg in dextrose 5 % 250 mL infusion       fluorouracil (ADRUCIL) 2,400 mg/m2 = 6,050 mg in sodium chloride 0.9% 361 mL chemo infusion       Supportive Care       atropine injection 0.4 mg  6/15/2022       Pre-Medications       LORazepam injection 1 mg       promethazine (PHENERGAN) 6.25 mg in dextrose 5 % 100 mL IVPB       palonosetron 0.25mg/dexamethasone 20mg in NS IVPB       Chemotherapy       irinotecan (CAMPTOSAR) 180 mg/m2 = 454 mg in sodium chloride 0.9% 500 mL chemo  infusion       leucovorin calcium 400 mg/m2 = 1,010 mg in dextrose 5 % 250 mL infusion       fluorouracil (ADRUCIL) 2,400 mg/m2 = 6,050 mg in sodium chloride 0.9% 361 mL chemo infusion       Supportive Care       atropine injection 0.4 mg    Supportive Plan Information  IV FLUIDS AND ELECTROLYTES   Brayden Solo MD   Upcoming Treatment Dates - IV FLUIDS AND ELECTROLYTES    No upcoming days in selected categories.    Therapy Plan Information  Flushes  heparin, porcine (PF) 100 unit/mL injection flush 500 Units  500 Units, Intravenous, Every visit  sodium chloride 0.9% flush 10 mL  10 mL, Intravenous, Every visit

## 2022-05-31 ENCOUNTER — PATIENT MESSAGE (OUTPATIENT)
Dept: ADMINISTRATIVE | Facility: HOSPITAL | Age: 52
End: 2022-05-31
Payer: COMMERCIAL

## 2022-05-31 ENCOUNTER — PATIENT MESSAGE (OUTPATIENT)
Dept: GASTROENTEROLOGY | Facility: CLINIC | Age: 52
End: 2022-05-31
Payer: COMMERCIAL

## 2022-06-01 ENCOUNTER — OFFICE VISIT (OUTPATIENT)
Dept: PSYCHIATRY | Facility: CLINIC | Age: 52
End: 2022-06-01
Payer: COMMERCIAL

## 2022-06-01 ENCOUNTER — DOCUMENTATION ONLY (OUTPATIENT)
Dept: INFUSION THERAPY | Facility: HOSPITAL | Age: 52
End: 2022-06-01

## 2022-06-01 ENCOUNTER — INFUSION (OUTPATIENT)
Dept: INFUSION THERAPY | Facility: HOSPITAL | Age: 52
End: 2022-06-01
Attending: INTERNAL MEDICINE
Payer: COMMERCIAL

## 2022-06-01 VITALS
SYSTOLIC BLOOD PRESSURE: 174 MMHG | BODY MASS INDEX: 47.09 KG/M2 | HEIGHT: 66 IN | RESPIRATION RATE: 18 BRPM | HEART RATE: 114 BPM | TEMPERATURE: 98 F | WEIGHT: 293 LBS | DIASTOLIC BLOOD PRESSURE: 87 MMHG

## 2022-06-01 DIAGNOSIS — C18.7 MALIGNANT NEOPLASM OF SIGMOID COLON: ICD-10-CM

## 2022-06-01 DIAGNOSIS — F33.1 MODERATE EPISODE OF RECURRENT MAJOR DEPRESSIVE DISORDER: Primary | ICD-10-CM

## 2022-06-01 DIAGNOSIS — C77.2 METASTASIS TO INTESTINAL LYMPH NODE: Primary | ICD-10-CM

## 2022-06-01 DIAGNOSIS — C80.1 ANXIETY ASSOCIATED WITH CANCER DIAGNOSIS: ICD-10-CM

## 2022-06-01 DIAGNOSIS — F41.1 ANXIETY ASSOCIATED WITH CANCER DIAGNOSIS: ICD-10-CM

## 2022-06-01 PROCEDURE — 25000003 PHARM REV CODE 250: Mod: PN | Performed by: INTERNAL MEDICINE

## 2022-06-01 PROCEDURE — 63600175 PHARM REV CODE 636 W HCPCS: Mod: PN | Performed by: INTERNAL MEDICINE

## 2022-06-01 PROCEDURE — 96413 CHEMO IV INFUSION 1 HR: CPT | Mod: PN

## 2022-06-01 PROCEDURE — 96416 CHEMO PROLONG INFUSE W/PUMP: CPT | Mod: PN

## 2022-06-01 PROCEDURE — 99999 PR PBB SHADOW E&M-EST. PATIENT-LVL I: ICD-10-PCS | Mod: PBBFAC,,, | Performed by: PSYCHOLOGIST

## 2022-06-01 PROCEDURE — 96368 THER/DIAG CONCURRENT INF: CPT | Mod: PN

## 2022-06-01 PROCEDURE — 96415 CHEMO IV INFUSION ADDL HR: CPT | Mod: PN

## 2022-06-01 PROCEDURE — 96367 TX/PROPH/DG ADDL SEQ IV INF: CPT | Mod: PN

## 2022-06-01 PROCEDURE — 96375 TX/PRO/DX INJ NEW DRUG ADDON: CPT | Mod: PN

## 2022-06-01 PROCEDURE — 90834 PR PSYCHOTHERAPY W/PATIENT, 45 MIN: ICD-10-PCS | Mod: S$GLB,,, | Performed by: PSYCHOLOGIST

## 2022-06-01 PROCEDURE — 99999 PR PBB SHADOW E&M-EST. PATIENT-LVL I: CPT | Mod: PBBFAC,,, | Performed by: PSYCHOLOGIST

## 2022-06-01 PROCEDURE — 90834 PSYTX W PT 45 MINUTES: CPT | Mod: S$GLB,,, | Performed by: PSYCHOLOGIST

## 2022-06-01 RX ORDER — SODIUM CHLORIDE 0.9 % (FLUSH) 0.9 %
10 SYRINGE (ML) INJECTION
Status: DISCONTINUED | OUTPATIENT
Start: 2022-06-01 | End: 2022-06-01 | Stop reason: HOSPADM

## 2022-06-01 RX ORDER — ATROPINE SULFATE 0.4 MG/ML
0.4 INJECTION, SOLUTION ENDOTRACHEAL; INTRAMEDULLARY; INTRAMUSCULAR; INTRAVENOUS; SUBCUTANEOUS ONCE AS NEEDED
Status: COMPLETED | OUTPATIENT
Start: 2022-06-01 | End: 2022-06-01

## 2022-06-01 RX ORDER — HEPARIN 100 UNIT/ML
500 SYRINGE INTRAVENOUS
Status: DISCONTINUED | OUTPATIENT
Start: 2022-06-01 | End: 2022-06-01 | Stop reason: HOSPADM

## 2022-06-01 RX ORDER — LORAZEPAM 2 MG/ML
1 INJECTION INTRAMUSCULAR
Status: COMPLETED | OUTPATIENT
Start: 2022-06-01 | End: 2022-06-01

## 2022-06-01 RX ADMIN — SODIUM CHLORIDE 440 MG: 0.9 INJECTION, SOLUTION INTRAVENOUS at 12:06

## 2022-06-01 RX ADMIN — SODIUM CHLORIDE: 9 INJECTION, SOLUTION INTRAVENOUS at 11:06

## 2022-06-01 RX ADMIN — PROMETHAZINE HYDROCHLORIDE 6.25 MG: 25 INJECTION INTRAMUSCULAR; INTRAVENOUS at 12:06

## 2022-06-01 RX ADMIN — FLUOROURACIL 6050 MG: 50 INJECTION, SOLUTION INTRAVENOUS at 02:06

## 2022-06-01 RX ADMIN — LORAZEPAM 1 MG: 2 INJECTION INTRAMUSCULAR; INTRAVENOUS at 12:06

## 2022-06-01 RX ADMIN — ATROPINE SULFATE 0.4 MG: 0.4 INJECTION, SOLUTION INTRAMUSCULAR; INTRAVENOUS; SUBCUTANEOUS at 01:06

## 2022-06-01 RX ADMIN — PALONOSETRON: 0.05 INJECTION, SOLUTION INTRAVENOUS at 12:06

## 2022-06-01 RX ADMIN — LEUCOVORIN CALCIUM 1010 MG: 350 INJECTION, POWDER, LYOPHILIZED, FOR SOLUTION INTRAMUSCULAR; INTRAVENOUS at 12:06

## 2022-06-01 NOTE — PROGRESS NOTES
PSYCHO-ONCOLOGY NOTE/ Individual Psychotherapy     Date: 6/1/2022   Site:  [facility]      Therapeutic Intervention: Met with patient.  Outpatient - Insight oriented psychotherapy 45 min - CPT code 47290 and Outpatient - Supportive psychotherapy 45 min - CPT Code 26647    This includes face to face time and non-face to face time preparing to see the patient, obtaining and/or reviewing separately obtained history, documenting clinical information in the electronic or other health record, independently interpreting results and communicating results to the patient/family/caregiver, or care coordinator.      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    Anxiety    Insomnia    Insomnia    Anxiety    Other constipation    Nausea    Generalized anxiety disorder with panic attacks    Chemotherapy adverse reaction    Mucositis due to chemotherapy    Morbid obesity    Secondary adenocarcinoma of lymph node    Thyroid nodule    Hypercalcemia    Transaminitis    Dysuria    Chemotherapy-induced neutropenia    Hypophosphatemia    Functional diarrhea       Chief complaint/reason for encounter: adjustment to illness, depression and anxiety      Met with patient to evaluate psychosocial adaptation to diagnosis/treatment of metastatic colon cancer    Current Medications  Current Outpatient Medications   Medication    acetaminophen (TYLENOL) 500 MG tablet    buPROPion (WELLBUTRIN SR) 150 MG TBSR 12 hr tablet    buPROPion (WELLBUTRIN SR) 150 MG TBSR 12 hr tablet    busPIRone (BUSPAR) 10 MG tablet    busPIRone  (BUSPAR) 10 MG tablet    cyclobenzaprine (FLEXERIL) 10 MG tablet    cyclobenzaprine (FLEXERIL) 10 MG tablet    duke's soln (benadryl 30 mL, mylanta 30 mL, LIDOcaine 30 mL, nystatin 30 mL) 120mL    granisetron (SANCUSO) 3.1 mg/24 hour    granisetron (SANCUSO) 3.1 mg/24 hour    Lactobacillus rhamnosus GG (CULTURELLE) 10 billion cell capsule    lactulose (CHRONULAC) 10 gram/15 mL solution    levothyroxine (SYNTHROID) 200 MCG tablet    LIDOcaine-prilocaine (EMLA) cream    LIDOcaine-prilocaine (EMLA) cream    LORazepam (ATIVAN) 1 MG tablet    multivitamin (THERAGRAN) per tablet    ondansetron (ZOFRAN-ODT) 8 MG TbDL    ondansetron (ZOFRAN-ODT) 8 MG TbDL    potassium phosphate, monobasic, (K-PHOS) 500 mg TbSO    promethazine (PHENERGAN) 12.5 MG Tab    promethazine (PHENERGAN) 12.5 MG Tab    tamsulosin (FLOMAX) 0.4 mg Cap    traMADoL (ULTRAM) 50 mg tablet    traZODone (DESYREL) 50 MG tablet     No current facility-administered medications for this visit.     Facility-Administered Medications Ordered in Other Visits   Medication Frequency    alteplase injection 2 mg PRN    atropine injection 0.4 mg Once PRN    fluorouracil (ADRUCIL) 2,400 mg/m2 = 6,050 mg in sodium chloride 0.9% 240 mL chemo infusion over 46 hr    heparin, porcine (PF) 100 unit/mL injection flush 500 Units PRN    irinotecan (CAMPTOSAR) 440 mg in sodium chloride 0.9% 500 mL chemo infusion 1 time in Clinic/HOD    leucovorin calcium 400 mg/m2 = 1,010 mg in dextrose 5 % 250 mL infusion 1 time in Clinic/HOD    promethazine (PHENERGAN) 6.25 mg in dextrose 5 % 100 mL IVPB 1 time in Clinic/HOD    sodium chloride 0.9% flush 10 mL PRN       Objective:  Gail Mckinnon arrived promptly for the session and met during her scheduled infusion. The patient was fully cooperative throughout the session and appeared to be in good spirits.  Appearance: age appropriate, professionally  dressed, well groomed  Behavior/Cooperation: friendly and  cooperative  Speech: normal in rate, volume, and tone and appropriate quality, quantity and organization of sentences  Mood: steady  Affect: euthymic, mood congruent and appropriate  Thought Process: goal-directed, logical  Thought Content: normal,  No delusions or paranoia; did not appear to be responding to internal stimuli during the session  Orientation: grossly intact  Memory: grossly intact  Attention Span/Concentration: Attends to session without distraction; reports no difficulty  Fund of Knowledge: average  Estimate of Intelligence: average from verbal skills and history  Cognition: grossly intact  Insight: patient has awareness of illness; good insight into own behavior and behavior of others  Judgment: the patient's behavior is adequate to circumstances    Interval history and content of current session:  Discussed recent adaptations to tx plan post-[facility] visit. Reports to be coping in an adequate manner w/ regard to news of definitive surgical tx-though notes that she feels that she has not processed much of the associated emotions. Processed associated emotions at length and provided CBT to manage negative cognitions (potential for delays, tx alterations, etc.). Discussed potential for progression past tx phase and explored the impact of language on identity-encouraging use of accurate/supportive language in the new perception of self. Identified and evaluated psychosocial and environmental stressors secondary to diagnosis and treatment.  Examined proactive behaviors that may be implemented to minimize or ameliorate psychosocial stressors secondary to treatment, encouraging integration of care seeking and value based behaviors if possible.     Risk parameters:   Patient reports no suicidal ideation  Patient reports no homicidal ideation  Patient reports no self-injurious behavior  Patient reports no violent behavior   Safety needs:  None at this time      Verbal deficits: None     Patient's response to  intervention:The patient's response to intervention is accepting, motivated.     Progress toward goals and other mental status changes:  The patient's progress toward goals is excellent.      Progress to date:Progress as Expected      Goals from last visit: Met        Patient Strengths: verbal, motivated, intelligent, successful, good social support, good insight, commitment to wellness, strong cultural traditions        Treatment Plan:individual psychotherapy  ? Target symptoms: depression, anxiety, adjustment  ? Why chosen therapy is appropriate versus another modality: relevant to diagnosis, patient responds to this modality, evidence based practice  ? Outcome monitoring methods: self-report, observation, tx team feedback  ? Therapeutic intervention type: insight oriented psychotherapy, supportive psychotherapy  ? Prognosis: Good                            Behavioral goals:               Exercise: continued/increased as per physician recommendations              Stress management: appropriate boundaries and accepting aid when needed              Social engagement: continued and increased especially w/ grandchildren/family, as per CDC COVID-19 guidelines              Therapy: identity and accurate language, adaptive coping, CBT (cognitive restructuring)    Return to clinic: as scheduled     Length of Service (minutes direct face-to-face contact): 45    Diagnosis:     ICD-10-CM ICD-9-CM   1. Moderate episode of recurrent major depressive disorder  F33.1 296.32   2. Anxiety associated with cancer diagnosis  F41.1 300.09    C80.1                 Maximo Peters License #[id number]  [place] License #[id number]

## 2022-06-01 NOTE — PROGRESS NOTES
Oncology   Chemotherapy Infusion Visit    Quick Social Service Status Follow Up   Met w/ pt briefly to follow up on social and emotional needs since initiation of treatment.      SW followed up with pt after her recent trips to [facility]. Pt said at first they said there was not anything different they would do treatment wise for her and told her she would have 2-5 years. She said this was difficulty to hear and did not know what to think or feel. After she returned home she heard back from them that her case was looked at again and the surgeon said  he can operate. Pt has returned there and meet with the surgeon and is encouraged after meeting with him. She will be going there with her mom and daughter for the surgery on 7/12/22.     SW educated pt on the [facility] in [place], a free lodging place thought he ACS. SW contacted [facility] in [place] and was informed the SW at [facility] has to make the referral. Pt can call the treating doctor at [facility] and request a SW call her regarding lodging assistance.    Pt agreed to call for SW at [facility] to request referral be sent to [facility] for her.     SW to follow.        Radha Giordano, MEDINA  06/01/2022  3:48 PM

## 2022-06-01 NOTE — PROGRESS NOTES
Oncology Nutrition       Weight Check   Chart reviewed. Weight check completed on pt.     CBW: 299 lbs  Weight at initiation of tx: 267 lbs    Wt Readings from Last 10 Encounters:   06/01/22 135.9 kg (299 lb 9.7 oz)   05/30/22 135.9 kg (299 lb 9.7 oz)   05/13/22 (!) 136.6 kg (301 lb 2.4 oz)   05/11/22 (!) 136.6 kg (301 lb 2.4 oz)   05/09/22 (!) 136.6 kg (301 lb 2.4 oz)   04/20/22 (!) 136.2 kg (300 lb 4.3 oz)   04/20/22 (!) 136.2 kg (300 lb 4.3 oz)   04/08/22 135.7 kg (299 lb 2.6 oz)   04/06/22 135.7 kg (299 lb 2.6 oz)   04/04/22 135.7 kg (299 lb 2.6 oz)          RD plan of care: Weight is currently 299 lbs. No significant change at this time. Per nursing nutrition risk report, pt denies any nutritional concerns or challenges at this time. RD to continue to monitor; no nutritional interventions are needed at this time.     Ciera Richardson, DAVIDN, LDN  06/01/2022  3:30 PM

## 2022-06-01 NOTE — PLAN OF CARE
Pt tolerated Folfiri well. No adverse reaction noted. Pt education reinforced on chemo regimen, side effects, what to expect, and when to call physician. Pt verbalized understanding. I reviewed pt calendar w/ pt and understanding verbalized. PAC remains accessed with 5FU infusing at 5.2cc/hr over 46 hours. Patent upon discharge in NAD

## 2022-06-02 ENCOUNTER — PATIENT MESSAGE (OUTPATIENT)
Dept: GASTROENTEROLOGY | Facility: CLINIC | Age: 52
End: 2022-06-02
Payer: COMMERCIAL

## 2022-06-03 ENCOUNTER — INFUSION (OUTPATIENT)
Dept: INFUSION THERAPY | Facility: HOSPITAL | Age: 52
End: 2022-06-03
Attending: INTERNAL MEDICINE
Payer: COMMERCIAL

## 2022-06-03 ENCOUNTER — PATIENT MESSAGE (OUTPATIENT)
Dept: GASTROENTEROLOGY | Facility: CLINIC | Age: 52
End: 2022-06-03
Payer: COMMERCIAL

## 2022-06-03 VITALS
RESPIRATION RATE: 16 BRPM | SYSTOLIC BLOOD PRESSURE: 132 MMHG | TEMPERATURE: 98 F | DIASTOLIC BLOOD PRESSURE: 62 MMHG | HEART RATE: 102 BPM

## 2022-06-03 DIAGNOSIS — C18.7 MALIGNANT NEOPLASM OF SIGMOID COLON: ICD-10-CM

## 2022-06-03 DIAGNOSIS — C77.2 METASTASIS TO INTESTINAL LYMPH NODE: Primary | ICD-10-CM

## 2022-06-03 PROCEDURE — A4216 STERILE WATER/SALINE, 10 ML: HCPCS | Mod: PN | Performed by: INTERNAL MEDICINE

## 2022-06-03 PROCEDURE — 63600175 PHARM REV CODE 636 W HCPCS: Mod: PN | Performed by: INTERNAL MEDICINE

## 2022-06-03 PROCEDURE — 25000003 PHARM REV CODE 250: Mod: PN | Performed by: INTERNAL MEDICINE

## 2022-06-03 RX ORDER — SODIUM CHLORIDE 0.9 % (FLUSH) 0.9 %
10 SYRINGE (ML) INJECTION
Status: DISCONTINUED | OUTPATIENT
Start: 2022-06-03 | End: 2022-06-03 | Stop reason: HOSPADM

## 2022-06-03 RX ORDER — HEPARIN 100 UNIT/ML
500 SYRINGE INTRAVENOUS
Status: DISCONTINUED | OUTPATIENT
Start: 2022-06-03 | End: 2022-06-03 | Stop reason: HOSPADM

## 2022-06-03 RX ADMIN — Medication 10 ML: at 02:06

## 2022-06-03 RX ADMIN — Medication 500 UNITS: at 02:06

## 2022-06-06 ENCOUNTER — LAB VISIT (OUTPATIENT)
Dept: LAB | Facility: HOSPITAL | Age: 52
End: 2022-06-06
Payer: COMMERCIAL

## 2022-06-06 ENCOUNTER — DOCUMENTATION ONLY (OUTPATIENT)
Dept: INFUSION THERAPY | Facility: HOSPITAL | Age: 52
End: 2022-06-06
Payer: COMMERCIAL

## 2022-06-06 ENCOUNTER — OFFICE VISIT (OUTPATIENT)
Dept: HEMATOLOGY/ONCOLOGY | Facility: CLINIC | Age: 52
End: 2022-06-06
Payer: COMMERCIAL

## 2022-06-06 ENCOUNTER — INFUSION (OUTPATIENT)
Dept: INFUSION THERAPY | Facility: HOSPITAL | Age: 52
End: 2022-06-06
Attending: INTERNAL MEDICINE
Payer: COMMERCIAL

## 2022-06-06 ENCOUNTER — PATIENT MESSAGE (OUTPATIENT)
Dept: HEMATOLOGY/ONCOLOGY | Facility: CLINIC | Age: 52
End: 2022-06-06

## 2022-06-06 ENCOUNTER — TELEPHONE (OUTPATIENT)
Dept: HEMATOLOGY/ONCOLOGY | Facility: CLINIC | Age: 52
End: 2022-06-06
Payer: COMMERCIAL

## 2022-06-06 ENCOUNTER — TELEPHONE (OUTPATIENT)
Dept: HEPATOLOGY | Facility: CLINIC | Age: 52
End: 2022-06-06
Payer: COMMERCIAL

## 2022-06-06 VITALS
OXYGEN SATURATION: 96 % | TEMPERATURE: 97 F | HEART RATE: 84 BPM | HEIGHT: 66 IN | BODY MASS INDEX: 46.95 KG/M2 | DIASTOLIC BLOOD PRESSURE: 86 MMHG | SYSTOLIC BLOOD PRESSURE: 129 MMHG | WEIGHT: 292.13 LBS | RESPIRATION RATE: 18 BRPM

## 2022-06-06 VITALS
BODY MASS INDEX: 46.95 KG/M2 | HEIGHT: 66 IN | DIASTOLIC BLOOD PRESSURE: 86 MMHG | OXYGEN SATURATION: 96 % | WEIGHT: 292.13 LBS | HEART RATE: 104 BPM | SYSTOLIC BLOOD PRESSURE: 138 MMHG | TEMPERATURE: 97 F | RESPIRATION RATE: 18 BRPM

## 2022-06-06 DIAGNOSIS — R10.9 ABDOMINAL CRAMPING: ICD-10-CM

## 2022-06-06 DIAGNOSIS — T45.1X5A CHEMOTHERAPY-INDUCED DIARRHEA: ICD-10-CM

## 2022-06-06 DIAGNOSIS — T45.1X5A CHEMOTHERAPY-INDUCED DIARRHEA: Primary | ICD-10-CM

## 2022-06-06 DIAGNOSIS — C18.7 MALIGNANT NEOPLASM OF SIGMOID COLON: Primary | ICD-10-CM

## 2022-06-06 DIAGNOSIS — C18.7 MALIGNANT NEOPLASM OF SIGMOID COLON: ICD-10-CM

## 2022-06-06 DIAGNOSIS — K59.1 FUNCTIONAL DIARRHEA: ICD-10-CM

## 2022-06-06 DIAGNOSIS — K52.1 CHEMOTHERAPY-INDUCED DIARRHEA: Primary | ICD-10-CM

## 2022-06-06 DIAGNOSIS — C77.2 METASTASIS TO INTESTINAL LYMPH NODE: ICD-10-CM

## 2022-06-06 DIAGNOSIS — K52.1 CHEMOTHERAPY-INDUCED DIARRHEA: ICD-10-CM

## 2022-06-06 DIAGNOSIS — R11.0 NAUSEA: ICD-10-CM

## 2022-06-06 LAB
ALBUMIN SERPL BCP-MCNC: 3.5 G/DL (ref 3.5–5.2)
ALP SERPL-CCNC: 128 U/L (ref 55–135)
ALT SERPL W/O P-5'-P-CCNC: 194 U/L (ref 10–44)
ANION GAP SERPL CALC-SCNC: 8 MMOL/L (ref 8–16)
AST SERPL-CCNC: 107 U/L (ref 10–40)
BILIRUB SERPL-MCNC: 0.6 MG/DL (ref 0.1–1)
BUN SERPL-MCNC: 11 MG/DL (ref 6–20)
CALCIUM SERPL-MCNC: 10.8 MG/DL (ref 8.7–10.5)
CHLORIDE SERPL-SCNC: 107 MMOL/L (ref 95–110)
CO2 SERPL-SCNC: 23 MMOL/L (ref 23–29)
CREAT SERPL-MCNC: 0.8 MG/DL (ref 0.5–1.4)
ERYTHROCYTE [DISTWIDTH] IN BLOOD BY AUTOMATED COUNT: 14.6 % (ref 11.5–14.5)
EST. GFR  (AFRICAN AMERICAN): >60 ML/MIN/1.73 M^2
EST. GFR  (NON AFRICAN AMERICAN): >60 ML/MIN/1.73 M^2
GLUCOSE SERPL-MCNC: 106 MG/DL (ref 70–110)
HCT VFR BLD AUTO: 49.9 % (ref 37–48.5)
HGB BLD-MCNC: 16.1 G/DL (ref 12–16)
IMM GRANULOCYTES # BLD AUTO: 0.02 K/UL (ref 0–0.04)
MAGNESIUM SERPL-MCNC: 1.9 MG/DL (ref 1.6–2.6)
MCH RBC QN AUTO: 30.8 PG (ref 27–31)
MCHC RBC AUTO-ENTMCNC: 32.3 G/DL (ref 32–36)
MCV RBC AUTO: 96 FL (ref 82–98)
NEUTROPHILS # BLD AUTO: 5.2 K/UL (ref 1.8–7.7)
PLATELET # BLD AUTO: 309 K/UL (ref 150–450)
PMV BLD AUTO: 10 FL (ref 9.2–12.9)
POTASSIUM SERPL-SCNC: 4 MMOL/L (ref 3.5–5.1)
PROT SERPL-MCNC: 6.9 G/DL (ref 6–8.4)
RBC # BLD AUTO: 5.22 M/UL (ref 4–5.4)
SODIUM SERPL-SCNC: 138 MMOL/L (ref 136–145)
WBC # BLD AUTO: 7.64 K/UL (ref 3.9–12.7)

## 2022-06-06 PROCEDURE — 96361 HYDRATE IV INFUSION ADD-ON: CPT | Mod: PN

## 2022-06-06 PROCEDURE — 96360 HYDRATION IV INFUSION INIT: CPT | Mod: PN

## 2022-06-06 PROCEDURE — 3079F PR MOST RECENT DIASTOLIC BLOOD PRESSURE 80-89 MM HG: ICD-10-PCS | Mod: CPTII,S$GLB,,

## 2022-06-06 PROCEDURE — 99214 PR OFFICE/OUTPT VISIT, EST, LEVL IV, 30-39 MIN: ICD-10-PCS | Mod: S$GLB,,,

## 2022-06-06 PROCEDURE — 63600175 PHARM REV CODE 636 W HCPCS: Mod: PN

## 2022-06-06 PROCEDURE — 3079F DIAST BP 80-89 MM HG: CPT | Mod: CPTII,S$GLB,,

## 2022-06-06 PROCEDURE — 85027 COMPLETE CBC AUTOMATED: CPT | Mod: PN

## 2022-06-06 PROCEDURE — 3075F PR MOST RECENT SYSTOLIC BLOOD PRESS GE 130-139MM HG: ICD-10-PCS | Mod: CPTII,S$GLB,,

## 2022-06-06 PROCEDURE — 83735 ASSAY OF MAGNESIUM: CPT | Mod: PN

## 2022-06-06 PROCEDURE — 36415 COLL VENOUS BLD VENIPUNCTURE: CPT | Mod: PN

## 2022-06-06 PROCEDURE — 3075F SYST BP GE 130 - 139MM HG: CPT | Mod: CPTII,S$GLB,,

## 2022-06-06 PROCEDURE — 25000003 PHARM REV CODE 250: Mod: PN

## 2022-06-06 PROCEDURE — 3008F BODY MASS INDEX DOCD: CPT | Mod: CPTII,S$GLB,,

## 2022-06-06 PROCEDURE — 3008F PR BODY MASS INDEX (BMI) DOCUMENTED: ICD-10-PCS | Mod: CPTII,S$GLB,,

## 2022-06-06 PROCEDURE — 80053 COMPREHEN METABOLIC PANEL: CPT | Mod: PN

## 2022-06-06 PROCEDURE — 99999 PR PBB SHADOW E&M-EST. PATIENT-LVL III: CPT | Mod: PBBFAC,,,

## 2022-06-06 PROCEDURE — 99999 PR PBB SHADOW E&M-EST. PATIENT-LVL III: ICD-10-PCS | Mod: PBBFAC,,,

## 2022-06-06 PROCEDURE — 99214 OFFICE O/P EST MOD 30 MIN: CPT | Mod: S$GLB,,,

## 2022-06-06 RX ORDER — SODIUM CHLORIDE 0.9 % (FLUSH) 0.9 %
10 SYRINGE (ML) INJECTION
Status: CANCELLED | OUTPATIENT
Start: 2022-06-06

## 2022-06-06 RX ORDER — GRANISETRON 3.1 MG/24H
PATCH TRANSDERMAL
COMMUNITY
Start: 2021-10-06 | End: 2022-06-15 | Stop reason: SDUPTHER

## 2022-06-06 RX ORDER — HEPARIN 100 UNIT/ML
500 SYRINGE INTRAVENOUS
Status: CANCELLED | OUTPATIENT
Start: 2022-06-06

## 2022-06-06 RX ORDER — BUPROPION HYDROCHLORIDE 150 MG/1
150 TABLET, EXTENDED RELEASE ORAL
COMMUNITY
Start: 2021-11-15 | End: 2022-06-15 | Stop reason: SDUPTHER

## 2022-06-06 RX ORDER — SODIUM CHLORIDE 0.9 % (FLUSH) 0.9 %
10 SYRINGE (ML) INJECTION
Status: DISCONTINUED | OUTPATIENT
Start: 2022-06-06 | End: 2022-06-06 | Stop reason: HOSPADM

## 2022-06-06 RX ORDER — SODIUM CHLORIDE 9 MG/ML
1000 INJECTION, SOLUTION INTRAVENOUS
Status: COMPLETED | OUTPATIENT
Start: 2022-06-06 | End: 2022-06-06

## 2022-06-06 RX ORDER — HEPARIN 100 UNIT/ML
500 SYRINGE INTRAVENOUS
Status: DISCONTINUED | OUTPATIENT
Start: 2022-06-06 | End: 2022-06-06 | Stop reason: HOSPADM

## 2022-06-06 RX ADMIN — SODIUM CHLORIDE 1000 ML: 0.9 INJECTION, SOLUTION INTRAVENOUS at 02:06

## 2022-06-06 RX ADMIN — Medication 500 UNITS: at 04:06

## 2022-06-06 NOTE — TELEPHONE ENCOUNTER
Patient called stating she is having stomach cramps after anything she eats or drinks. Then she gets nauseous and eventually has diarrhea. No vomiting but states that the stomach cramps are really bad. She is not really eating anything solid. She has not taken zofran bc she said it doesn't usually help. She said she usually does not feel like this after treatment and she just does not feel right. Per Dr Santo, appointment made to see NP to be evaluated for IV fluids and anti nausea medication.

## 2022-06-06 NOTE — TELEPHONE ENCOUNTER
"----- Message from Joellen Bashir RN sent at 6/6/2022 11:00 AM CDT -----  Message from Gail:  "I need help.  Antonia been trying to call and no one answers.  I don't know what's david on with me but I've been sick the whole weekend.  I need to get in touch with Dr. Santo to see what I need to do.  I have a colonoscopy scheduled for Wed and i feel like I have no life in my body right now."     "

## 2022-06-06 NOTE — PLAN OF CARE
Problem: Adult Inpatient Plan of Care  Goal: Patient-Specific Goal (Individualized)  Outcome: Ongoing, Progressing  Flowsheets (Taken 6/6/2022 1426)  Anxieties, Fears or Concerns: Accessing port  Individualized Care Needs: REcliner, warm blanket, pillow, broth and crackers  Patient-Specific Goals (Include Timeframe): Free of S/S of reaction with infusion.  Goal: Plan of Care Review  Outcome: Ongoing, Progressing  Flowsheets (Taken 6/6/2022 1426)  Plan of Care Reviewed With: patient     Problem: Fatigue  Goal: Improved Activity Tolerance  Outcome: Ongoing, Progressing  Intervention: Promote Improved Energy  Flowsheets (Taken 6/6/2022 1426)  Fatigue Management:   frequent rest breaks encouraged   paced activity encouraged  Sleep/Rest Enhancement: regular sleep/rest pattern promoted  Activity Management: Ambulated -L4    Patient to Infusion for hydration following appointment with provider. Treatment plan reviewed. VSS. Tolerated infusion. Uses [facility] for appointment schedule, Escorted to the front lobby for discharge to home.

## 2022-06-06 NOTE — TELEPHONE ENCOUNTER
Does she want to come in for some Iv   fluids and anti nausea medications? Let's have her come in and see either Vinod or Ophelia so she can be assessed. Thanks

## 2022-06-06 NOTE — PROGRESS NOTES
Urgent Care Oncology Visit    Date and Time: 06/06/2022 1:55 PM   Patient MRN: [id number]   Chief Complaint: Diarrhea   Reason for Urgent Care Visit: diarrhea and nausea  Intervention: imaging ordered, labs ordered and sent to infusion / IV fluids  Dispo: return to clinic for urgent care oncology follow up  UrgOnc appointment addressed via: [facility] message  This UrgOnc visit was in person  Time spent handling UC issue: 30   Was this virtual or in person UrgOnc visit appropriate? yes      PATIENT: Gail Mckinnon  MRN: [id number]  DATE: 6/6/2022      Diagnosis:   1. Malignant neoplasm of sigmoid colon    2. 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 Nausea    4. Functional diarrhea    5. Abdominal cramping        Chief Complaint: diarrhea     Subjective:   HPI: Ms. Mckinnon is a 52 y.o. female patient known to Dr. Santo for diagnosis of colon cancer, initially stage III and now with LN recurrence.     She completed adjuvant FOLFOX in November 2020. She tolerated only 4 cycles of FOLFOX before she developed an infusion reaction to oxaliplatin in cycle 5 was well as cycle 6. She then completed 6 cycles of infusional 5 FU.     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 She is now on second line therapy with FOLFIRI and Avastin    4/18/22 PET scan mid April that showed possible progression of her disease with enlarging lymphadenopahty from 1.7cm to 1.9 cm, no new lesion.     For now, we have continued FOLFIRI+ Avastin with plan to repeat short term imaging. She has been to [facility] for another opinion. No change was recommended in systemic therapy but she was called back to discuss surgical options.      She saw surgical oncology on 5/28 and they recommend surgical removal of the aorta caval nodes.  Recent sans at [facility]  show stable disease.    Surgery due 7/12/22. Plan for one more cycle of FOLFIRI without Avastin on 6/15/22.      Interim:   Patient received C24 FOLFIRI on 6/1/22.   6/1-6/3 she  felt nauseated, no vomiting. Took Ondansetron without much relief. Did not use her Sancuso patch with this cycle.   6/1-6/4 experienced constipation, did not take Lactulose. On 6/4 she had a large bowel movement with some BRBPR. She has noticed no blood since, only occasional streaks with wiping.   6/4 She began having diarrhea with severe abd cramping; has not taken anything to slow this. Also had vomiting. Unable to keep food or fluid in. She took a Phenergan at bedtime and was able to sleep with some relief.    Today 6/8/22 She is feeling weak, is not eating or drinking. Had one episode of diarrhea today. She has a 7 lbs weight loss since 6/1/22. Denies having had any recent infections, use of antibiotics. Denies fevers, HA, CP, SOB, cough, dysuria, hematuria, swelling, new lumps or bumps.     She does have a colonoscopy scheduled for 6/8/22.      Oncology History   Malignant neoplasm of sigmoid colon   3/16/2020 Initial Diagnosis    Malignant neoplasm of sigmoid colon     3/31/2020 Cancer Staged    Staging form: Colon and Rectum, AJCC 8th Edition  - Clinical stage from 3/31/2020: Stage IIIC (cT4b, cN2a, cM0)     5/6/2020 - 11/17/2020 Chemotherapy    Treatment Summary   Plan Name: OP FOLFOX 6 Q2W  Treatment Goal: Curative  Status: Inactive  Start Date: 5/6/2020  End Date: 11/6/2020  Provider: Dylan Leyva MD  Chemotherapy: fluorouraciL injection 945 mg, 400 mg/m2 = 945 mg, Intravenous, Clinic/HOD 1 time, 14 of 14 cycles  Administration: 945 mg (5/6/2020), 945 mg (5/20/2020), 945 mg (6/3/2020), 945 mg (6/17/2020), 945 mg (7/29/2020), 945 mg (8/12/2020), 945 mg (8/26/2020), 945 mg (9/9/2020), 945 mg (9/23/2020), 945 mg (10/6/2020), 945 mg (10/21/2020), 945 mg (11/4/2020)  fluorouraciL 2,400 mg/m2 = 5,665 mg in sodium chloride 0.9% 240 mL chemo infusion, 2,400 mg/m2 = 5,665 mg, Intravenous, Over 46 hours, 14 of 14 cycles  Administration: 5,665 mg (5/6/2020), 5,665 mg (5/20/2020), 5,665 mg (6/3/2020), 5,665 mg  (6/17/2020), 5,665 mg (7/29/2020), 5,665 mg (8/12/2020), 5,665 mg (8/26/2020), 5,665 mg (9/9/2020), 5,665 mg (9/23/2020), 5,665 mg (10/6/2020), 5,665 mg (10/21/2020), 5,665 mg (11/4/2020)  leucovorin calcium 900 mg in dextrose 5 % 250 mL infusion, 945 mg, Intravenous, Clinic/HOD 1 time, 14 of 14 cycles  Administration: 900 mg (5/6/2020), 900 mg (5/20/2020), 900 mg (6/3/2020), 900 mg (6/17/2020), 945 mg (6/30/2020), 945 mg (7/20/2020), 945 mg (7/29/2020), 945 mg (8/12/2020), 945 mg (8/26/2020), 945 mg (9/9/2020), 945 mg (9/23/2020), 945 mg (10/6/2020), 945 mg (10/21/2020), 945 mg (11/4/2020)  oxaliplatin (ELOXATIN) 200 mg in dextrose 5 % 500 mL chemo infusion, 201 mg, Intravenous, Clinic/HOD 1 time, 6 of 6 cycles  Dose modification: 65 mg/m2 (original dose 85 mg/m2, Cycle 6)  Administration: 200 mg (5/6/2020), 200 mg (5/20/2020), 200 mg (6/3/2020), 200 mg (6/17/2020), 201 mg (6/30/2020), 150 mg (7/20/2020)     6/16/2021 -  Chemotherapy    Treatment Summary   Plan Name: OP COLORECTAL FOLFIRI + BEVACIZUMAB Q2W  Treatment Goal: Control  Status: Active  Start Date: 6/16/2021  End Date: 6/17/2022 (Planned)  Provider: Marah Santo MD  Chemotherapy: fluorouraciL injection 990 mg, 400 mg/m2 = 990 mg, Intravenous, Clinic/HOD 1 time, 15 of 15 cycles  Administration: 990 mg (6/16/2021), 990 mg (6/30/2021), 990 mg (7/14/2021), 980 mg (7/28/2021), 990 mg (8/11/2021), 990 mg (8/25/2021), 990 mg (9/8/2021), 990 mg (9/22/2021), 990 mg (10/6/2021), 990 mg (10/20/2021), 990 mg (11/3/2021), 990 mg (12/1/2021), 990 mg (12/15/2021), 990 mg (11/17/2021), 990 mg (12/28/2021)  fluorouraciL 2,400 mg/m2 = 5,950 mg in sodium chloride 0.9% 240 mL chemo infusion, 2,400 mg/m2 = 5,950 mg, Intravenous, Over 46 hours, 24 of 25 cycles  Administration: 5,950 mg (6/16/2021), 5,950 mg (6/30/2021), 5,950 mg (7/14/2021), 5,880 mg (7/28/2021), 5,930 mg (8/11/2021), 5,930 mg (8/25/2021), 5,930 mg (9/8/2021), 5,930 mg (9/22/2021), 5,930 mg (10/6/2021),  5,930 mg (10/20/2021), 5,930 mg (11/3/2021), 5,930 mg (12/1/2021), 5,930 mg (12/15/2021), 5,930 mg (11/17/2021), 5,930 mg (12/28/2021), 6,050 mg (5/11/2022), 6,025 mg (3/9/2022), 6,025 mg (2/23/2022), 5,930 mg (2/2/2022), 5,930 mg (1/19/2022), 6,050 mg (4/20/2022), 6,025 mg (4/6/2022), 6,025 mg (3/23/2022), 6,050 mg (6/1/2022)  bevacizumab (AVASTIN) 600 mg in sodium chloride 0.9% 100 mL chemo infusion, 660 mg, Intravenous, Clinic/HOD 1 time, 23 of 23 cycles  Administration: 600 mg (6/30/2021), 600 mg (7/14/2021), 600 mg (7/28/2021), 600 mg (6/16/2021), 600 mg (8/11/2021), 655 mg (8/25/2021), 600 mg (9/8/2021), 600 mg (9/22/2021), 600 mg (10/6/2021), 600 mg (10/20/2021), 600 mg (11/3/2021), 655 mg (12/1/2021), 600 mg (12/15/2021), 600 mg (11/17/2021), 600 mg (12/28/2021), 600 mg (5/11/2022), 600 mg (3/9/2022), 600 mg (2/23/2022), 600 mg (2/2/2022), 600 mg (1/19/2022), 600 mg (4/20/2022), 680 mg (4/6/2022), 600 mg (3/23/2022)  irinotecan (CAMPTOSAR) 440 mg in sodium chloride 0.9% 500 mL chemo infusion, 446 mg, Intravenous, Clinic/HOD 1 time, 24 of 25 cycles  Dose modification: 180 mg/m2 (original dose 180 mg/m2, Cycle 24)  Administration: 440 mg (6/16/2021), 440 mg (6/30/2021), 440 mg (7/14/2021), 440 mg (7/28/2021), 440 mg (8/11/2021), 444 mg (8/25/2021), 440 mg (9/8/2021), 440 mg (9/22/2021), 440 mg (10/6/2021), 440 mg (10/20/2021), 440 mg (11/3/2021), 440 mg (12/1/2021), 440 mg (12/15/2021), 440 mg (11/17/2021), 440 mg (12/28/2021), 440 mg (5/11/2022), 440 mg (3/9/2022), 440 mg (2/23/2022), 440 mg (2/2/2022), 440 mg (1/19/2022), 440 mg (4/20/2022), 440 mg (4/6/2022), 440 mg (3/24/2022), 440 mg (6/1/2022)  leucovorin calcium 400 mg/m2 = 990 mg in dextrose 5 % 250 mL infusion, 400 mg/m2 = 990 mg, Intravenous, Clinic/HOD 1 time, 24 of 25 cycles  Dose modification: 400 mg/m2 (original dose 400 mg/m2, Cycle 24)  Administration: 990 mg (6/16/2021), 990 mg (6/30/2021), 990 mg (7/14/2021), 980 mg (7/28/2021), 990 mg  (8/11/2021), 990 mg (8/25/2021), 990 mg (9/8/2021), 990 mg (9/22/2021), 990 mg (10/6/2021), 990 mg (10/20/2021), 990 mg (11/3/2021), 990 mg (12/1/2021), 990 mg (12/15/2021), 990 mg (11/17/2021), 990 mg (12/28/2021), 1,010 mg (5/11/2022), 1,000 mg (3/9/2022), 1,000 mg (2/23/2022), 990 mg (2/2/2022), 990 mg (1/19/2022), 1,000 mg (4/20/2022), 1,000 mg (4/6/2022), 1,000 mg (3/24/2022), 1,010 mg (6/1/2022)     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   4/3/2020 Initial Diagnosis    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     5/6/2020 - 11/17/2020 Chemotherapy    Treatment Summary   Plan Name: OP FOLFOX 6 Q2W  Treatment Goal: Curative  Status: Inactive  Start Date: 5/6/2020  End Date: 11/6/2020  Provider: Dylan Leyva MD  Chemotherapy: fluorouraciL injection 945 mg, 400 mg/m2 = 945 mg, Intravenous, Clinic/[facility] 1 time, 14 of 14 cycles  Administration: 945 mg (5/6/2020), 945 mg (5/20/2020), 945 mg (6/3/2020), 945 mg (6/17/2020), 945 mg (7/29/2020), 945 mg (8/12/2020), 945 mg (8/26/2020), 945 mg (9/9/2020), 945 mg (9/23/2020), 945 mg (10/6/2020), 945 mg (10/21/2020), 945 mg (11/4/2020)  fluorouraciL 2,400 mg/m2 = 5,665 mg in sodium chloride 0.9% 240 mL chemo infusion, 2,400 mg/m2 = 5,665 mg, Intravenous, Over 46 hours, 14 of 14 cycles  Administration: 5,665 mg (5/6/2020), 5,665 mg (5/20/2020), 5,665 mg (6/3/2020), 5,665 mg (6/17/2020), 5,665 mg (7/29/2020), 5,665 mg (8/12/2020), 5,665 mg (8/26/2020), 5,665 mg (9/9/2020), 5,665 mg (9/23/2020), 5,665 mg (10/6/2020), 5,665 mg (10/21/2020), 5,665 mg (11/4/2020)  leucovorin calcium 900 mg in dextrose 5 % 250 mL infusion, 945 mg, Intravenous, Clinic/[facility] 1 time, 14 of 14 cycles  Administration: 900 mg (5/6/2020), 900 mg (5/20/2020), 900 mg (6/3/2020), 900 mg (6/17/2020), 945 mg (6/30/2020), 945 mg (7/20/2020), 945 mg (7/29/2020), 945 mg (8/12/2020), 945 mg (8/26/2020), 945 mg (9/9/2020), 945 mg (9/23/2020), 945 mg (10/6/2020), 945 mg (10/21/2020), 945 mg (11/4/2020)  oxaliplatin  (ELOXATIN) 200 mg in dextrose 5 % 500 mL chemo infusion, 201 mg, Intravenous, Clinic/HOD 1 time, 6 of 6 cycles  Dose modification: 65 mg/m2 (original dose 85 mg/m2, Cycle 6)  Administration: 200 mg (5/6/2020), 200 mg (5/20/2020), 200 mg (6/3/2020), 200 mg (6/17/2020), 201 mg (6/30/2020), 150 mg (7/20/2020)     6/16/2021 -  Chemotherapy    Treatment Summary   Plan Name: OP COLORECTAL FOLFIRI + BEVACIZUMAB Q2W  Treatment Goal: Control  Status: Active  Start Date: 6/16/2021  End Date: 6/17/2022 (Planned)  Provider: Marah Santo MD  Chemotherapy: fluorouraciL injection 990 mg, 400 mg/m2 = 990 mg, Intravenous, Clinic/HOD 1 time, 15 of 15 cycles  Administration: 990 mg (6/16/2021), 990 mg (6/30/2021), 990 mg (7/14/2021), 980 mg (7/28/2021), 990 mg (8/11/2021), 990 mg (8/25/2021), 990 mg (9/8/2021), 990 mg (9/22/2021), 990 mg (10/6/2021), 990 mg (10/20/2021), 990 mg (11/3/2021), 990 mg (12/1/2021), 990 mg (12/15/2021), 990 mg (11/17/2021), 990 mg (12/28/2021)  fluorouraciL 2,400 mg/m2 = 5,950 mg in sodium chloride 0.9% 240 mL chemo infusion, 2,400 mg/m2 = 5,950 mg, Intravenous, Over 46 hours, 24 of 25 cycles  Administration: 5,950 mg (6/16/2021), 5,950 mg (6/30/2021), 5,950 mg (7/14/2021), 5,880 mg (7/28/2021), 5,930 mg (8/11/2021), 5,930 mg (8/25/2021), 5,930 mg (9/8/2021), 5,930 mg (9/22/2021), 5,930 mg (10/6/2021), 5,930 mg (10/20/2021), 5,930 mg (11/3/2021), 5,930 mg (12/1/2021), 5,930 mg (12/15/2021), 5,930 mg (11/17/2021), 5,930 mg (12/28/2021), 6,050 mg (5/11/2022), 6,025 mg (3/9/2022), 6,025 mg (2/23/2022), 5,930 mg (2/2/2022), 5,930 mg (1/19/2022), 6,050 mg (4/20/2022), 6,025 mg (4/6/2022), 6,025 mg (3/23/2022), 6,050 mg (6/1/2022)  bevacizumab (AVASTIN) 600 mg in sodium chloride 0.9% 100 mL chemo infusion, 660 mg, Intravenous, Clinic/[facility] 1 time, 23 of 23 cycles  Administration: 600 mg (6/30/2021), 600 mg (7/14/2021), 600 mg (7/28/2021), 600 mg (6/16/2021), 600 mg (8/11/2021), 655 mg (8/25/2021), 600 mg  (9/8/2021), 600 mg (9/22/2021), 600 mg (10/6/2021), 600 mg (10/20/2021), 600 mg (11/3/2021), 655 mg (12/1/2021), 600 mg (12/15/2021), 600 mg (11/17/2021), 600 mg (12/28/2021), 600 mg (5/11/2022), 600 mg (3/9/2022), 600 mg (2/23/2022), 600 mg (2/2/2022), 600 mg (1/19/2022), 600 mg (4/20/2022), 680 mg (4/6/2022), 600 mg (3/23/2022)  irinotecan (CAMPTOSAR) 440 mg in sodium chloride 0.9% 500 mL chemo infusion, 446 mg, Intravenous, Clinic/[facility] 1 time, 24 of 25 cycles  Dose modification: 180 mg/m2 (original dose 180 mg/m2, Cycle 24)  Administration: 440 mg (6/16/2021), 440 mg (6/30/2021), 440 mg (7/14/2021), 440 mg (7/28/2021), 440 mg (8/11/2021), 444 mg (8/25/2021), 440 mg (9/8/2021), 440 mg (9/22/2021), 440 mg (10/6/2021), 440 mg (10/20/2021), 440 mg (11/3/2021), 440 mg (12/1/2021), 440 mg (12/15/2021), 440 mg (11/17/2021), 440 mg (12/28/2021), 440 mg (5/11/2022), 440 mg (3/9/2022), 440 mg (2/23/2022), 440 mg (2/2/2022), 440 mg (1/19/2022), 440 mg (4/20/2022), 440 mg (4/6/2022), 440 mg (3/24/2022), 440 mg (6/1/2022)  leucovorin calcium 400 mg/m2 = 990 mg in dextrose 5 % 250 mL infusion, 400 mg/m2 = 990 mg, Intravenous, [facility] 1 time, 24 of 25 cycles  Dose modification: 400 mg/m2 (original dose 400 mg/m2, Cycle 24)  Administration: 990 mg (6/16/2021), 990 mg (6/30/2021), 990 mg (7/14/2021), 980 mg (7/28/2021), 990 mg (8/11/2021), 990 mg (8/25/2021), 990 mg (9/8/2021), 990 mg (9/22/2021), 990 mg (10/6/2021), 990 mg (10/20/2021), 990 mg (11/3/2021), 990 mg (12/1/2021), 990 mg (12/15/2021), 990 mg (11/17/2021), 990 mg (12/28/2021), 1,010 mg (5/11/2022), 1,000 mg (3/9/2022), 1,000 mg (2/23/2022), 990 mg (2/2/2022), 990 mg (1/19/2022), 1,000 mg (4/20/2022), 1,000 mg (4/6/2022), 1,000 mg (3/24/2022), 1,010 mg (6/1/2022)       Past Medical History:   Past Medical History:   Diagnosis Date    Anxiety     Depression     FH: ovarian cancer 3/16/2020    Hx of psychiatric care     Effexor, Paxil, Lexapro, Zoloft, Wellbutrin,  Trazodone Buspar    Hyperthyroidism     Hypothyroid     Kidney calculi     Malignant neoplasm of sigmoid colon 3/16/2020    Menorrhagia     Multinodular goiter 2012    Palpitation     Psychiatric problem     Venous insufficiency        Past Surgical HIstory:   Past Surgical History:   Procedure Laterality Date     SECTION, CLASSIC      x3    COLONOSCOPY N/A 2020    Procedure: COLONOSCOPY;  Surgeon: Shane Parker MD;  Location: [facility];  Service: Endoscopy;  Laterality: N/A;    CYSTOSCOPY W/ URETERAL STENT PLACEMENT Bilateral 3/25/2020    Procedure: CYSTOSCOPY, WITH URETERAL STENT INSERTION;  Surgeon: Claudio Tyson MD;  Location: [address];  Service: Urology;  Laterality: Bilateral;    INSERTION OF TUNNELED CENTRAL VENOUS CATHETER (CVC) WITH SUBCUTANEOUS PORT N/A 2020    Procedure: [id number]-L-CATH;  [facility];  Location: [address];  Service: Radiology;  Laterality: N/A;  Room 189/[facility]    LAPAROSCOPIC SIGMOIDECTOMY N/A 3/25/2020    Procedure: COLECTOMY, SIGMOID, LAPAROSCOPIC, flex sig, ERAS high;  Surgeon: Silvio Man MD;  Location: [address];  Service: Colon and Rectal;  Laterality: N/A;    MOBILIZATION OF SPLENIC FLEXURE  3/25/2020    Procedure: MOBILIZATION, SPLENIC FLEXURE;  Surgeon: Silvio Man MD;  Location: [facility] OR [address] [place] [address];  Service: Colon and Rectal;;    tonsillectomy      TOTAL ABDOMINAL HYSTERECTOMY W/ BILATERAL SALPINGOOPHORECTOMY N/A 3/25/2020    Procedure: HYSTERECTOMY, TOTAL, ABDOMINAL, WITH BILATERAL SALPINGO-OOPHORECTOMY;  Surgeon: Keron Brady MD;  Location: [address];  Service: Oncology;  Laterality: N/A;       Family History:   Family History   Problem Relation Age of Onset    Heart disease Father     Diabetes Mother 65    Drug abuse Brother     Drug abuse Brother     Cancer Maternal Aunt         lung cancer    Cancer Maternal Grandmother         stomach cancer-  started in ovaries    Ovarian cancer Maternal Grandmother         stomach cancer- started in ovaries    Colon cancer Paternal Uncle     Cancer Paternal Uncle     Ovarian cancer Maternal Aunt     Ovarian cancer Maternal Aunt     Ovarian cancer Cousin         mother had ovarian cancer    Drug abuse Son     Drug abuse Son         clean/sober since 2012    Colon cancer Son     No Known Problems Daughter     Uterine cancer Neg Hx     Breast cancer Neg Hx        Social History:  reports that she has never smoked. She has never used smokeless tobacco. She reports current alcohol use. She reports that she does not use drugs.    Allergies:  Review of patient's allergies indicates:   Allergen Reactions    Oxaliplatin Other (See Comments)     Itchy hands, throat closed up, trouble hearing - during infusion    Penicillin g      unknown    Penicillins Hives and Rash     childhood allergy  childhood allergy  unknown       Medications:  Current Outpatient Medications   Medication Sig Dispense Refill    acetaminophen (TYLENOL) 500 MG tablet Take 2 tablets (1,000 mg total) by mouth every 8 (eight) hours. 60 tablet 0    buPROPion (WELLBUTRIN SR) 150 MG TBSR 12 hr tablet Take 1 tablet (150 mg total) by mouth 2 (two) times daily. 180 tablet 0    buPROPion (WELLBUTRIN SR) 150 MG TBSR 12 hr tablet Take 150 mg by mouth.      buPROPion (WELLBUTRIN SR) 150 MG TBSR 12 hr tablet Take 150 mg by mouth.      busPIRone (BUSPAR) 10 MG tablet Take 1 tablet (10 mg total) by mouth 2 (two) times daily. 180 tablet 0    busPIRone (BUSPAR) 10 MG tablet 2 (two) times a day as needed.      cyclobenzaprine (FLEXERIL) 10 MG tablet Take 1 tablet (10 mg total) by mouth nightly. 30 tablet 0    cyclobenzaprine (FLEXERIL) 10 MG tablet Take 10 mg by mouth.      duke's soln (benadryl 30 mL, mylanta 30 mL, LIDOcaine 30 mL, nystatin 30 mL) 120mL 5 ml swish and swallow every 4 hours as needed for mouth pain/ulcers 120 mL 0    granisetron  (SANCUSO) 3.1 mg/24 hour once a week. 4 patch 3    granisetron (SANCUSO) 3.1 mg/24 hour Place 1 patch (3.1 mg total) onto the skin every 7 days AS DIRECTED. 4 patch 3    granisetron (SANCUSO) 3.1 mg/24 hour every 7 days.  As needed      Lactobacillus rhamnosus GG (CULTURELLE) 10 billion cell capsule Take 1 capsule by mouth once daily.      lactulose (CHRONULAC) 10 gram/15 mL solution Take 30 mLs (20 g total) by mouth daily as needed (constipation). 500 mL 2    levothyroxine (SYNTHROID) 200 MCG tablet Take 1 tablet (200 mcg total) by mouth once daily. 90 tablet 3    LIDOcaine-prilocaine (EMLA) cream Apply topically as needed (30 to 60 min prior chemo or port use). 30 g 3    LIDOcaine-prilocaine (EMLA) cream daily as needed.      LORazepam (ATIVAN) 1 MG tablet Take 1 tablet (1 mg total) by mouth every 12 (twelve) hours as needed for Anxiety (nausea). 30 tablet 0    multivitamin (THERAGRAN) per tablet Take 1 tablet by mouth once daily.      ondansetron (ZOFRAN-ODT) 8 MG TbDL Take by mouth.      ondansetron (ZOFRAN-ODT) 8 MG TbDL Take 1 tablet (8 mg total) by mouth every 8 (eight) hours as needed. 60 tablet 3    potassium phosphate, monobasic, (K-PHOS) 500 mg TbSO Take 1 tablet (500 mg total) by mouth once daily. 30 tablet 11    promethazine (PHENERGAN) 12.5 MG Tab (Take 1-2 tabs every 6 hours as needed for nausea persistent despite zofran) 40 tablet 3    promethazine (PHENERGAN) 12.5 MG Tab every 6 (six) hours as needed.      traMADoL (ULTRAM) 50 mg tablet Take 1 tablet (50 mg total) by mouth 3 (three) times daily as needed for Pain. 90 tablet 0    tamsulosin (FLOMAX) 0.4 mg Cap Take 1 capsule (0.4 mg total) by mouth once daily. for 14 days 14 capsule 0    traZODone (DESYREL) 50 MG tablet Take 1 tablet (50 mg total) by mouth nightly. 30 tablet 2     No current facility-administered medications for this visit.     Facility-Administered Medications Ordered in Other Visits   Medication Dose Route  "Frequency Provider Last Rate Last Admin    heparin, porcine (PF) 100 unit/mL injection flush 500 Units  500 Units Intravenous PRN Ophelia Moreno NP        promethazine (PHENERGAN) 25 mg in dextrose 5 % 50 mL IVPB  25 mg Intravenous Once Ophelia Moreno NP        sodium chloride 0.9% bolus 1,000 mL  1,000 mL Intravenous 1 time in Clinic/HOD Ophelia Moreno NP        sodium chloride 0.9% flush 10 mL  10 mL Intravenous PRN Ophelia Moreno NP           Review of Systems   Constitutional: Negative for appetite change and unexpected weight change.   HENT: Negative for mouth sores and trouble swallowing.    Eyes: Negative for visual disturbance.   Respiratory: Negative for cough and shortness of breath.    Cardiovascular: Negative for chest pain.   Gastrointestinal: Positive for abdominal pain and diarrhea.   Genitourinary: Negative for frequency.   Musculoskeletal: Negative for back pain.   Skin: Negative for rash.   Neurological: Negative for light-headedness and headaches.   Hematological: Negative for adenopathy.   Psychiatric/Behavioral: The patient is not nervous/anxious.        ECOG Performance Status:   ECOG SCORE    0 - Fully active-able to carry on all pre-disease performance without restriction         Objective:      Vitals:   Vitals:    06/06/22 1250   BP: 138/86   BP Location: Right arm   Patient Position: Sitting   BP Method: Large (Manual)   Pulse: 104   Resp: 18   Temp: 97.1 °F (36.2 °C)   TempSrc: Temporal   SpO2: 96%   Weight: 132.5 kg (292 lb 1.8 oz)   Height: 5' 6" (1.676 m)     BMI: Body mass index is 47.15 kg/m².    Physical Exam  Vitals reviewed.   Constitutional:       General: She is not in acute distress.     Appearance: She is obese. She is not diaphoretic.   HENT:      Head: Normocephalic and atraumatic.   Eyes:      General: No scleral icterus.  Cardiovascular:      Rate and Rhythm: Normal rate and regular rhythm.      Heart sounds: Normal heart sounds. No murmur heard.  Pulmonary:      Effort: " Pulmonary effort is normal. No respiratory distress.      Breath sounds: No wheezing or rhonchi.   Abdominal:      General: Bowel sounds are normal. There is no distension.      Palpations: Abdomen is soft. There is no mass.      Tenderness: There is abdominal tenderness (generalized; epigastric ). There is no guarding or rebound.   Musculoskeletal:      Cervical back: No tenderness.      Right lower leg: No edema.      Left lower leg: No edema.   Lymphadenopathy:      Cervical: No cervical adenopathy.   Skin:     General: Skin is warm.      Coloration: Skin is not jaundiced.      Findings: No rash.   Neurological:      Mental Status: She is alert and oriented to person, place, and time.      Gait: Gait normal.   Psychiatric:         Mood and Affect: Mood normal.         Behavior: Behavior normal.         Laboratory Data:  Lab Results   Component Value Date    WBC 7.64 06/06/2022    HGB 16.1 (H) 06/06/2022    HCT 49.9 (H) 06/06/2022    MCV 96 06/06/2022     06/06/2022         Assessment:       1. Malignant neoplasm of sigmoid colon    2. 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 Nausea    4. Functional diarrhea    5. 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 Oxaliplatin was stopped due to severe adverse reaction.  -Follow-up CTs and PET in May 2021 showed enlarging retroperitoneal node, concerning for metastatic disease.   -She is tolerating FOLFIRI + Avastin well. Most recent PET scan showed slight enlargement of one lymph node, does not meet criteria for disease progression.   -[facility] records and recommendations are reviewed.   -She has had repeat imaging at [facility], stable disease and is deemed a  surgical candidate.   -Surgery due 7/12/22. Plan for one more cycle of FOLFIRI without Avastin on 6/15/22      Nausea  -IV fluids today with PRN Phenergan  -Continue PO Phenergan and Ondansetron at home  -Will monitor     Abdominal discomfort   -US abd complete, epigastric pain   -Hx gallstones     Diarrhea  -Will obtain stool culture  -Hold off on Imodium until cx  -Continue to push PO fluids as able  -IV fluids today   -will monitor     Follow up with me tomorrow, labs prior, IV fluids if needed.     Patient queried and all questions were answered.  Assessment/Plan reviewed and approved by Dr. Santo     Route Chart for Scheduling    Med Onc Chart Routing      Follow up with physician    Follow up with TAVO Other. IV fluids today, PRN phenergan for nausea, follow up tomorrow with labs prior, possible IV fluids tomorrow    Labs Other   Lab interval:  Stool for cx, BMP, Mg level prior to appointment tomorrow.    Imaging Other   Abd US- abdominal pain, cramping, hx gallstones    Pharmacy appointment    Other referrals          Treatment Plan Information   OP COLORECTAL FOLFIRI + BEVACIZUMAB Q2W   Marah Santo MD   Upcoming Treatment Dates - OP COLORECTAL FOLFIRI + BEVACIZUMAB Q2W    6/15/2022       Pre-Medications       LORazepam injection 1 mg       promethazine (PHENERGAN) 6.25 mg in dextrose 5 % 100 mL IVPB       palonosetron 0.25mg/dexamethasone 20mg in NS IVPB       Chemotherapy       irinotecan (CAMPTOSAR) 180 mg/m2 = 454 mg in sodium chloride 0.9% 500 mL chemo infusion       leucovorin calcium 400 mg/m2 = 1,010 mg in dextrose 5 % 250 mL infusion       fluorouracil (ADRUCIL) 2,400 mg/m2 = 6,050 mg in sodium chloride 0.9% 361 mL chemo infusion       Supportive Care       atropine injection 0.4 mg    Supportive Plan Information  IV FLUIDS AND ELECTROLYTES   Brayden Solo MD   Upcoming Treatment Dates - IV FLUIDS AND ELECTROLYTES    No upcoming days in selected categories.    Therapy Plan Information  PORT  FLUSH  Flushes  heparin, porcine (PF) 100 unit/mL injection flush 500 Units  500 Units, Intravenous, Every visit  sodium chloride 0.9% flush 10 mL  10 mL, Intravenous, Every visit

## 2022-06-06 NOTE — PROGRESS NOTES
ONCOLOGY NUTRITION   FOLLOW UP VISIT        Gail Mckinnon is a 52 y.o. female.  DATE: 06/06/2022        Oncology Diagnosis: Colon Cancer     REFERRAL FROM:   [] Integrative Oncology   [] Med/Heme Oncology  [] Radiation Oncology  [] Surgical Oncology   [] Infusion Nurse    [x] Routine Nutrition follow up    TREATMENT PLAN:   [] Full treatment plan pending  [x] Chemotherapy  [] Immunotherapy  [] Radiation  [] Concurrent  [] Surgery  [] Treatment complete/post-treatment    ANTHROPOMETRICS:  Wt Readings from Last 10 Encounters:   06/06/22 132.5 kg (292 lb 1.8 oz)   06/06/22 132.5 kg (292 lb 1.8 oz)   06/01/22 135.9 kg (299 lb 9.7 oz)   05/30/22 135.9 kg (299 lb 9.7 oz)   05/13/22 (!) 136.6 kg (301 lb 2.4 oz)   05/11/22 (!) 136.6 kg (301 lb 2.4 oz)   05/09/22 (!) 136.6 kg (301 lb 2.4 oz)   04/20/22 (!) 136.2 kg (300 lb 4.3 oz)   04/20/22 (!) 136.2 kg (300 lb 4.3 oz)   04/08/22 135.7 kg (299 lb 2.6 oz)      Weight Changes: has decreased 7 pounds over last 6 days    PHYSICAL EXAM:  Muscle Wasting Observed:  [x] No Deficit   [] Mild Deficit   [] Moderate   [] Severe    INTAKE:  [x] PO Intake [] TF Intake  Current Diet: regular diet  Dietary Patterns:  Eating meals/snacks as tolerated  [] Oral nutritional supplements: none reported    SYMPTOMS/COMPLAINTS:   [] No nutritional concerns at current  [] Diarrhea                    [] Constipation           [x] Nausea                 [x] Vomiting                [] Indigestion                [] Reflux              [] Poor Appetite            [] Anorexia                 [] Early Satiety         [] Gas                       [] Bloating                     [] Dry Mouth    [] Mucositis                   [] Mouth Sores           [] Poor Dentition      [] Difficulty chewing  [] Difficulty Swallowing   [] Pain with swallowing [] Change in taste      [] Change in smell   [] Pain (general)       [] Fatigue                      [] Sleep issues    [] Weight loss  [] other, please  specify    Nutrition Re-Assessment Risk: Mild    [x] Labs reviewed   [x] Meds reviewed    Education Provided:   [] No Education Needed at this time  [] Diarrhea                                              [] Constipation                          [x] Nausea/Vomiting  [] Mucositis                [] Dry Mouth    [] Dealing with changes in Taste/Smell  [] Dealing with Poor Appetite   [] Soft/moist Diet      [] Weight Loss/Gain     [] Weight Maintenance                           [] Indigestion/GERD                 [] Gas/Bloating          [] Foods High/ Low in specific nutrients [] Increasing Calories/Protein   [] Milkshake/Smoothies Recipes   [] Nutrition Supplements                        [] Increasing Fluid Intakes         [] Foods that fight cancer    [] Evidence bases resources                 [] Fermented Foods/Probiotics  [] Mediterranean/Plant Based Diet     [] Other, specify                                   [] Handouts provided      [] Samples provided     RD NOTE:  RD met with patient at chairside during infusion treatment d/t reports of pain with N/V over the weekend. Pt here as add on today to get IV fluids and phenergan. Pt states she declined the phenergan d/t needing to drive herself home but she is already feeling better. Pt reports she only has one treatment left after this before surgery and states this is the worst she has felt after doing this for a year. At one point, pt was using Enterade to help with side effects but unfortunately it does not work like it used to. Pt denies further needs from RD but very appreciative of visit.    RD Goals:   [] Weight stable                  [] Weight gain                      [] Weight Loss                               [] Continue adequate Kcal/protein   [] Increase Kcal/protein      [] Adjust Tube-feeding Rx   [] Tolerate Tube Feedings             [] Increase tube feedings to goal     [] Tolerate Supplements     [x] Symptom Improvement   [] Understand  nutrition Education  [] Offer supportive visits   [] other, please specify    RECOMMENDATIONS:  1. Antemetic per MD recs  2. Consume adequate calorie/protein intake to maintain weight.     Follow up: Next infusion or PRN throughout tx    Raquel Bullock MS, RD, LDN  06/06/2022  4:11 PM

## 2022-06-07 ENCOUNTER — DOCUMENTATION ONLY (OUTPATIENT)
Dept: INFUSION THERAPY | Facility: HOSPITAL | Age: 52
End: 2022-06-07
Payer: COMMERCIAL

## 2022-06-07 ENCOUNTER — PATIENT MESSAGE (OUTPATIENT)
Dept: HEMATOLOGY/ONCOLOGY | Facility: CLINIC | Age: 52
End: 2022-06-07
Payer: COMMERCIAL

## 2022-06-07 NOTE — PROGRESS NOTES
Oncology   Chemotherapy Infusion Visit    SW saw pt on 6/6/22 late entry    Quick Social Service Status Follow Up   Met w/ pt briefly to follow up on social and emotional needs since initiation of treatment.      SW saw pt at chairside. She was added on to the schcedule. Pt said she has been sick all weekend. Feeling nauseated. She continued to feel bad today and call the doctor and was added on for hydration. She is hoping this helps her feel better. SW provided emotional support.     Pt has reached out to SW at [facility] already. She had to leave a message. She is trying to inquire about a referral to the [facility] for her upcoming surgery.     SW to follow.         Radha Giordano, MEDINA  06/07/2022  11:07 AM

## 2022-06-08 ENCOUNTER — PATIENT MESSAGE (OUTPATIENT)
Dept: HEMATOLOGY/ONCOLOGY | Facility: CLINIC | Age: 52
End: 2022-06-08
Payer: COMMERCIAL

## 2022-06-09 ENCOUNTER — TELEPHONE (OUTPATIENT)
Dept: HEMATOLOGY/ONCOLOGY | Facility: CLINIC | Age: 52
End: 2022-06-09
Payer: COMMERCIAL

## 2022-06-09 NOTE — TELEPHONE ENCOUNTER
I spoke with her this morning, she is feeling better but not well enough to remain NPO for an afternoon US. She would like to cancel the US and will get back with me about symptoms, no need to r/s that appointment at this time.   Thanks  Ophelia

## 2022-06-09 NOTE — TELEPHONE ENCOUNTER
Spoke with the pt and the pt said that she talk with Ophelia Meadegore this am and that she did not need anything else and she appreciated me checking back. Pt verbalized and understanding.

## 2022-06-13 ENCOUNTER — INFUSION (OUTPATIENT)
Dept: INFUSION THERAPY | Facility: HOSPITAL | Age: 52
End: 2022-06-13
Attending: INTERNAL MEDICINE
Payer: COMMERCIAL

## 2022-06-13 ENCOUNTER — OFFICE VISIT (OUTPATIENT)
Dept: HEMATOLOGY/ONCOLOGY | Facility: CLINIC | Age: 52
End: 2022-06-13
Payer: COMMERCIAL

## 2022-06-13 ENCOUNTER — LAB VISIT (OUTPATIENT)
Dept: LAB | Facility: HOSPITAL | Age: 52
End: 2022-06-13
Attending: INTERNAL MEDICINE
Payer: COMMERCIAL

## 2022-06-13 VITALS
TEMPERATURE: 98 F | DIASTOLIC BLOOD PRESSURE: 77 MMHG | SYSTOLIC BLOOD PRESSURE: 119 MMHG | RESPIRATION RATE: 18 BRPM | HEART RATE: 80 BPM

## 2022-06-13 VITALS
HEART RATE: 87 BPM | BODY MASS INDEX: 46.59 KG/M2 | HEIGHT: 66 IN | TEMPERATURE: 97 F | OXYGEN SATURATION: 99 % | WEIGHT: 289.88 LBS | SYSTOLIC BLOOD PRESSURE: 110 MMHG | DIASTOLIC BLOOD PRESSURE: 70 MMHG

## 2022-06-13 DIAGNOSIS — R19.7 DIARRHEA, UNSPECIFIED TYPE: ICD-10-CM

## 2022-06-13 DIAGNOSIS — R11.0 NAUSEA: ICD-10-CM

## 2022-06-13 DIAGNOSIS — C18.7 MALIGNANT NEOPLASM OF SIGMOID COLON: Primary | ICD-10-CM

## 2022-06-13 DIAGNOSIS — C77.2 METASTASIS TO INTESTINAL LYMPH NODE: ICD-10-CM

## 2022-06-13 DIAGNOSIS — C77.9 SECONDARY ADENOCARCINOMA OF LYMPH NODE: ICD-10-CM

## 2022-06-13 DIAGNOSIS — C18.7 MALIGNANT NEOPLASM OF SIGMOID COLON: ICD-10-CM

## 2022-06-13 DIAGNOSIS — E87.6 HYPOKALEMIA: ICD-10-CM

## 2022-06-13 DIAGNOSIS — R74.01 TRANSAMINITIS: ICD-10-CM

## 2022-06-13 LAB
ALBUMIN SERPL BCP-MCNC: 3.3 G/DL (ref 3.5–5.2)
ALP SERPL-CCNC: 103 U/L (ref 55–135)
ALT SERPL W/O P-5'-P-CCNC: 72 U/L (ref 10–44)
ANION GAP SERPL CALC-SCNC: 9 MMOL/L (ref 8–16)
AST SERPL-CCNC: 50 U/L (ref 10–40)
BASOPHILS # BLD AUTO: 0.07 K/UL (ref 0–0.2)
BASOPHILS NFR BLD: 1.1 % (ref 0–1.9)
BILIRUB SERPL-MCNC: 0.3 MG/DL (ref 0.1–1)
BUN SERPL-MCNC: 9 MG/DL (ref 6–20)
CALCIUM SERPL-MCNC: 9.9 MG/DL (ref 8.7–10.5)
CHLORIDE SERPL-SCNC: 113 MMOL/L (ref 95–110)
CO2 SERPL-SCNC: 19 MMOL/L (ref 23–29)
CREAT SERPL-MCNC: 0.9 MG/DL (ref 0.5–1.4)
DIFFERENTIAL METHOD: ABNORMAL
EOSINOPHIL # BLD AUTO: 0.7 K/UL (ref 0–0.5)
EOSINOPHIL NFR BLD: 11.4 % (ref 0–8)
ERYTHROCYTE [DISTWIDTH] IN BLOOD BY AUTOMATED COUNT: 15.1 % (ref 11.5–14.5)
EST. GFR  (AFRICAN AMERICAN): >60 ML/MIN/1.73 M^2
EST. GFR  (NON AFRICAN AMERICAN): >60 ML/MIN/1.73 M^2
GLUCOSE SERPL-MCNC: 86 MG/DL (ref 70–110)
HCT VFR BLD AUTO: 46.6 % (ref 37–48.5)
HGB BLD-MCNC: 15.1 G/DL (ref 12–16)
IMM GRANULOCYTES # BLD AUTO: 0.03 K/UL (ref 0–0.04)
IMM GRANULOCYTES NFR BLD AUTO: 0.5 % (ref 0–0.5)
LYMPHOCYTES # BLD AUTO: 2.2 K/UL (ref 1–4.8)
LYMPHOCYTES NFR BLD: 34.8 % (ref 18–48)
MCH RBC QN AUTO: 30.6 PG (ref 27–31)
MCHC RBC AUTO-ENTMCNC: 32.4 G/DL (ref 32–36)
MCV RBC AUTO: 95 FL (ref 82–98)
MONOCYTES # BLD AUTO: 0.6 K/UL (ref 0.3–1)
MONOCYTES NFR BLD: 9.6 % (ref 4–15)
NEUTROPHILS # BLD AUTO: 2.7 K/UL (ref 1.8–7.7)
NEUTROPHILS NFR BLD: 42.6 % (ref 38–73)
NRBC BLD-RTO: 0 /100 WBC
PLATELET # BLD AUTO: 298 K/UL (ref 150–450)
PMV BLD AUTO: 9.8 FL (ref 9.2–12.9)
POTASSIUM SERPL-SCNC: 3.1 MMOL/L (ref 3.5–5.1)
PROT SERPL-MCNC: 6.2 G/DL (ref 6–8.4)
RBC # BLD AUTO: 4.93 M/UL (ref 4–5.4)
SODIUM SERPL-SCNC: 141 MMOL/L (ref 136–145)
WBC # BLD AUTO: 6.43 K/UL (ref 3.9–12.7)
WBC #/AREA STL HPF: NORMAL /[HPF]

## 2022-06-13 PROCEDURE — 87449 NOS EACH ORGANISM AG IA: CPT | Performed by: INTERNAL MEDICINE

## 2022-06-13 PROCEDURE — 99999 PR PBB SHADOW E&M-EST. PATIENT-LVL V: CPT | Mod: PBBFAC,,, | Performed by: INTERNAL MEDICINE

## 2022-06-13 PROCEDURE — 96365 THER/PROPH/DIAG IV INF INIT: CPT | Mod: PN

## 2022-06-13 PROCEDURE — 25000003 PHARM REV CODE 250: Mod: PN | Performed by: INTERNAL MEDICINE

## 2022-06-13 PROCEDURE — 36415 COLL VENOUS BLD VENIPUNCTURE: CPT | Mod: PN | Performed by: INTERNAL MEDICINE

## 2022-06-13 PROCEDURE — 99999 PR PBB SHADOW E&M-EST. PATIENT-LVL V: ICD-10-PCS | Mod: PBBFAC,,, | Performed by: INTERNAL MEDICINE

## 2022-06-13 PROCEDURE — 99215 OFFICE O/P EST HI 40 MIN: CPT | Mod: S$GLB,,, | Performed by: INTERNAL MEDICINE

## 2022-06-13 PROCEDURE — 89055 LEUKOCYTE ASSESSMENT FECAL: CPT | Performed by: INTERNAL MEDICINE

## 2022-06-13 PROCEDURE — 3074F PR MOST RECENT SYSTOLIC BLOOD PRESSURE < 130 MM HG: ICD-10-PCS | Mod: CPTII,S$GLB,, | Performed by: INTERNAL MEDICINE

## 2022-06-13 PROCEDURE — 99215 PR OFFICE/OUTPT VISIT, EST, LEVL V, 40-54 MIN: ICD-10-PCS | Mod: S$GLB,,, | Performed by: INTERNAL MEDICINE

## 2022-06-13 PROCEDURE — 3074F SYST BP LT 130 MM HG: CPT | Mod: CPTII,S$GLB,, | Performed by: INTERNAL MEDICINE

## 2022-06-13 PROCEDURE — 87427 SHIGA-LIKE TOXIN AG IA: CPT | Performed by: INTERNAL MEDICINE

## 2022-06-13 PROCEDURE — 63600175 PHARM REV CODE 636 W HCPCS: Mod: PN | Performed by: INTERNAL MEDICINE

## 2022-06-13 PROCEDURE — 96366 THER/PROPH/DIAG IV INF ADDON: CPT | Mod: PN

## 2022-06-13 PROCEDURE — 3008F PR BODY MASS INDEX (BMI) DOCUMENTED: ICD-10-PCS | Mod: CPTII,S$GLB,, | Performed by: INTERNAL MEDICINE

## 2022-06-13 PROCEDURE — 87046 STOOL CULTR AEROBIC BACT EA: CPT | Mod: 59 | Performed by: INTERNAL MEDICINE

## 2022-06-13 PROCEDURE — 3008F BODY MASS INDEX DOCD: CPT | Mod: CPTII,S$GLB,, | Performed by: INTERNAL MEDICINE

## 2022-06-13 PROCEDURE — 3078F DIAST BP <80 MM HG: CPT | Mod: CPTII,S$GLB,, | Performed by: INTERNAL MEDICINE

## 2022-06-13 PROCEDURE — 87045 FECES CULTURE AEROBIC BACT: CPT | Performed by: INTERNAL MEDICINE

## 2022-06-13 PROCEDURE — 80053 COMPREHEN METABOLIC PANEL: CPT | Mod: PN | Performed by: INTERNAL MEDICINE

## 2022-06-13 PROCEDURE — 3078F PR MOST RECENT DIASTOLIC BLOOD PRESSURE < 80 MM HG: ICD-10-PCS | Mod: CPTII,S$GLB,, | Performed by: INTERNAL MEDICINE

## 2022-06-13 PROCEDURE — 85025 COMPLETE CBC W/AUTO DIFF WBC: CPT | Mod: PN | Performed by: INTERNAL MEDICINE

## 2022-06-13 RX ORDER — SODIUM CHLORIDE 0.9 % (FLUSH) 0.9 %
10 SYRINGE (ML) INJECTION
Status: DISCONTINUED | OUTPATIENT
Start: 2022-06-13 | End: 2022-06-13 | Stop reason: HOSPADM

## 2022-06-13 RX ORDER — POTASSIUM CHLORIDE 20 MEQ/1
20 TABLET, EXTENDED RELEASE ORAL DAILY
Qty: 3 TABLET | Refills: 1 | Status: SHIPPED | OUTPATIENT
Start: 2022-06-13 | End: 2022-06-16

## 2022-06-13 RX ORDER — HEPARIN 100 UNIT/ML
500 SYRINGE INTRAVENOUS
Status: CANCELLED | OUTPATIENT
Start: 2022-06-13

## 2022-06-13 RX ORDER — SODIUM CHLORIDE 0.9 % (FLUSH) 0.9 %
10 SYRINGE (ML) INJECTION
Status: CANCELLED | OUTPATIENT
Start: 2022-06-13

## 2022-06-13 RX ORDER — HEPARIN 100 UNIT/ML
500 SYRINGE INTRAVENOUS
Status: DISCONTINUED | OUTPATIENT
Start: 2022-06-13 | End: 2022-06-13 | Stop reason: HOSPADM

## 2022-06-13 RX ADMIN — POTASSIUM CHLORIDE: 2 INJECTION, SOLUTION, CONCENTRATE INTRAVENOUS at 03:06

## 2022-06-13 RX ADMIN — Medication 500 UNITS: at 05:06

## 2022-06-13 NOTE — PROGRESS NOTES
PROGRESS NOTE    Subjective:       Patient ID: Gail Mckinnon is a 52 y.o. female.  MRN: [id number]  : 1970    Chief Complaint: Colon Cancer (2 week follow up, malignant neoplasm of sigmoid colon )      History of Present Illness:   Gail Mckinnon is a 52 y.o. female who presents with colon cancer, initially stage III and now with LN recurrence.       She completed adjuvant FOLFOX in 2020. She tolerated only 4 cycles of FOLFOX before she developed an infusion reaction to oxaliplatin in cycle 5 was well as cycle 6. She then completed 6 cycles of infusional 5 FU.     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 She is now on second line therapy with FOLFIRI and Avastin.      She presented to the ED on  with left flank pain. CT renal stone study showed Moderate hydronephrosis on the left secondary to 3 mm calculus at the UPJ.    She had a PET scan mid April that showed possible progression of her disease with enlarging lymphadenopahty from 1.7cm to 1.9 cm, no new lesion.     For now, we have continued FOLFIRI+ Avastin with plan to repeat short term imaging. She has been to [facility] for another opinion. No change was recommended in systemic therapy but she was called back to discuss surgical options.      She saw surgical oncology on  and they recommend surgical removal of the aorta caval nodes.  Recent sans at [facility]  show stable disease.    Surgery due 22. We will give 2 more cycle of FOLFIRI without Avastin.        Interim history:  She presents to discuss symptoms, labs,and further recommendations.     She has felt unwell since her last chemotherapy. She developed nausea and diarrhea and still not much better. She also had abdominal pain, that has resolved by now.    Has significant diarrhea, had 8-9 watery bowel movements yesterday, 1-2 bowel movements today.     Oncology History:  Oncology History    Malignant neoplasm of sigmoid colon   3/16/2020 Initial Diagnosis    Malignant neoplasm of sigmoid colon     3/31/2020 Cancer Staged    Staging form: Colon and Rectum, AJCC 8th Edition  - Clinical stage from 3/31/2020: Stage IIIC (cT4b, cN2a, cM0)     5/6/2020 - 11/17/2020 Chemotherapy    Treatment Summary   Plan Name: OP FOLFOX 6 Q2W  Treatment Goal: Curative  Status: Inactive  Start Date: 5/6/2020  End Date: 11/6/2020  Provider: Dylan Leyva MD  Chemotherapy: fluorouraciL injection 945 mg, 400 mg/m2 = 945 mg, Intravenous, Clinic/[facility] 1 time, 14 of 14 cycles  Administration: 945 mg (5/6/2020), 945 mg (5/20/2020), 945 mg (6/3/2020), 945 mg (6/17/2020), 945 mg (7/29/2020), 945 mg (8/12/2020), 945 mg (8/26/2020), 945 mg (9/9/2020), 945 mg (9/23/2020), 945 mg (10/6/2020), 945 mg (10/21/2020), 945 mg (11/4/2020)  fluorouraciL 2,400 mg/m2 = 5,665 mg in sodium chloride 0.9% 240 mL chemo infusion, 2,400 mg/m2 = 5,665 mg, Intravenous, Over 46 hours, 14 of 14 cycles  Administration: 5,665 mg (5/6/2020), 5,665 mg (5/20/2020), 5,665 mg (6/3/2020), 5,665 mg (6/17/2020), 5,665 mg (7/29/2020), 5,665 mg (8/12/2020), 5,665 mg (8/26/2020), 5,665 mg (9/9/2020), 5,665 mg (9/23/2020), 5,665 mg (10/6/2020), 5,665 mg (10/21/2020), 5,665 mg (11/4/2020)  leucovorin calcium 900 mg in dextrose 5 % 250 mL infusion, 945 mg, Intravenous, Clinic/[facility] 1 time, 14 of 14 cycles  Administration: 900 mg (5/6/2020), 900 mg (5/20/2020), 900 mg (6/3/2020), 900 mg (6/17/2020), 945 mg (6/30/2020), 945 mg (7/20/2020), 945 mg (7/29/2020), 945 mg (8/12/2020), 945 mg (8/26/2020), 945 mg (9/9/2020), 945 mg (9/23/2020), 945 mg (10/6/2020), 945 mg (10/21/2020), 945 mg (11/4/2020)  oxaliplatin (ELOXATIN) 200 mg in dextrose 5 % 500 mL chemo infusion, 201 mg, Intravenous, Clinic/[facility] 1 time, 6 of 6 cycles  Dose modification: 65 mg/m2 (original dose 85 mg/m2, Cycle 6)  Administration: 200 mg (5/6/2020), 200 mg (5/20/2020), 200 mg (6/3/2020), 200 mg (6/17/2020),  201 mg (6/30/2020), 150 mg (7/20/2020)     6/16/2021 -  Chemotherapy    Treatment Summary   Plan Name: OP COLORECTAL FOLFIRI + BEVACIZUMAB Q2W  Treatment Goal: Control  Status: Active  Start Date: 6/16/2021  End Date: 6/17/2022  Provider: Marah Santo MD  Chemotherapy: fluorouraciL injection 990 mg, 400 mg/m2 = 990 mg, Intravenous, Clinic/[facility] 1 time, 15 of 15 cycles  Administration: 990 mg (6/16/2021), 990 mg (6/30/2021), 990 mg (7/14/2021), 980 mg (7/28/2021), 990 mg (8/11/2021), 990 mg (8/25/2021), 990 mg (9/8/2021), 990 mg (9/22/2021), 990 mg (10/6/2021), 990 mg (10/20/2021), 990 mg (11/3/2021), 990 mg (12/1/2021), 990 mg (12/15/2021), 990 mg (11/17/2021), 990 mg (12/28/2021)  fluorouraciL 2,400 mg/m2 = 5,950 mg in sodium chloride 0.9% 240 mL chemo infusion, 2,400 mg/m2 = 5,950 mg, Intravenous, Over 46 hours, 25 of 25 cycles  Administration: 5,950 mg (6/16/2021), 5,950 mg (6/30/2021), 5,950 mg (7/14/2021), 5,880 mg (7/28/2021), 5,930 mg (8/11/2021), 5,930 mg (8/25/2021), 5,930 mg (9/8/2021), 5,930 mg (9/22/2021), 5,930 mg (10/6/2021), 5,930 mg (10/20/2021), 5,930 mg (11/3/2021), 5,930 mg (12/1/2021), 5,930 mg (12/15/2021), 5,930 mg (11/17/2021), 5,930 mg (12/28/2021), 6,050 mg (5/11/2022), 6,025 mg (3/9/2022), 6,025 mg (2/23/2022), 5,930 mg (2/2/2022), 5,930 mg (1/19/2022), 6,050 mg (4/20/2022), 6,025 mg (4/6/2022), 6,025 mg (3/23/2022), 6,050 mg (6/1/2022), 6,050 mg (6/15/2022)  bevacizumab (AVASTIN) 600 mg in sodium chloride 0.9% 100 mL chemo infusion, 660 mg, Intravenous, Clinic/[facility] 1 time, 23 of 23 cycles  Administration: 600 mg (6/30/2021), 600 mg (7/14/2021), 600 mg (7/28/2021), 600 mg (6/16/2021), 600 mg (8/11/2021), 655 mg (8/25/2021), 600 mg (9/8/2021), 600 mg (9/22/2021), 600 mg (10/6/2021), 600 mg (10/20/2021), 600 mg (11/3/2021), 655 mg (12/1/2021), 600 mg (12/15/2021), 600 mg (11/17/2021), 600 mg (12/28/2021), 600 mg (5/11/2022), 600 mg (3/9/2022), 600 mg (2/23/2022), 600 mg (2/2/2022), 600 mg  (1/19/2022), 600 mg (4/20/2022), 680 mg (4/6/2022), 600 mg (3/23/2022)  irinotecan (CAMPTOSAR) 440 mg in sodium chloride 0.9% 500 mL chemo infusion, 446 mg, Intravenous, Clinic/[facility] 1 time, 25 of 25 cycles  Dose modification: 180 mg/m2 (original dose 180 mg/m2, Cycle 24)  Administration: 440 mg (6/16/2021), 440 mg (6/30/2021), 440 mg (7/14/2021), 440 mg (7/28/2021), 440 mg (8/11/2021), 444 mg (8/25/2021), 440 mg (9/8/2021), 440 mg (9/22/2021), 440 mg (10/6/2021), 440 mg (10/20/2021), 440 mg (11/3/2021), 440 mg (12/1/2021), 440 mg (12/15/2021), 440 mg (11/17/2021), 440 mg (12/28/2021), 440 mg (5/11/2022), 440 mg (3/9/2022), 440 mg (2/23/2022), 440 mg (2/2/2022), 440 mg (1/19/2022), 440 mg (4/20/2022), 440 mg (4/6/2022), 440 mg (3/24/2022), 440 mg (6/1/2022), 440 mg (6/15/2022)  leucovorin calcium 400 mg/m2 = 990 mg in dextrose 5 % 250 mL infusion, 400 mg/m2 = 990 mg, Intravenous, Clinic/[facility] 1 time, 25 of 25 cycles  Dose modification: 400 mg/m2 (original dose 400 mg/m2, Cycle 24)  Administration: 990 mg (6/16/2021), 990 mg (6/30/2021), 990 mg (7/14/2021), 980 mg (7/28/2021), 990 mg (8/11/2021), 990 mg (8/25/2021), 990 mg (9/8/2021), 990 mg (9/22/2021), 990 mg (10/6/2021), 990 mg (10/20/2021), 990 mg (11/3/2021), 990 mg (12/1/2021), 990 mg (12/15/2021), 990 mg (11/17/2021), 990 mg (12/28/2021), 1,010 mg (5/11/2022), 1,000 mg (3/9/2022), 1,000 mg (2/23/2022), 990 mg (2/2/2022), 990 mg (1/19/2022), 1,000 mg (4/20/2022), 1,000 mg (4/6/2022), 1,000 mg (3/24/2022), 1,010 mg (6/1/2022), 1,010 mg (6/15/2022)     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   4/3/2020 Initial Diagnosis    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     5/6/2020 - 11/17/2020 Chemotherapy    Treatment Summary   Plan Name: OP FOLFOX 6 Q2W  Treatment Goal: Curative  Status: Inactive  Start Date: 5/6/2020  End Date: 11/6/2020  Provider: Dylan Leyva MD  Chemotherapy: fluorouraciL injection 945 mg, 400 mg/m2 = 945 mg, Intravenous, Clinic/[facility] 1 time, 14 of 14  cycles  Administration: 945 mg (5/6/2020), 945 mg (5/20/2020), 945 mg (6/3/2020), 945 mg (6/17/2020), 945 mg (7/29/2020), 945 mg (8/12/2020), 945 mg (8/26/2020), 945 mg (9/9/2020), 945 mg (9/23/2020), 945 mg (10/6/2020), 945 mg (10/21/2020), 945 mg (11/4/2020)  fluorouraciL 2,400 mg/m2 = 5,665 mg in sodium chloride 0.9% 240 mL chemo infusion, 2,400 mg/m2 = 5,665 mg, Intravenous, Over 46 hours, 14 of 14 cycles  Administration: 5,665 mg (5/6/2020), 5,665 mg (5/20/2020), 5,665 mg (6/3/2020), 5,665 mg (6/17/2020), 5,665 mg (7/29/2020), 5,665 mg (8/12/2020), 5,665 mg (8/26/2020), 5,665 mg (9/9/2020), 5,665 mg (9/23/2020), 5,665 mg (10/6/2020), 5,665 mg (10/21/2020), 5,665 mg (11/4/2020)  leucovorin calcium 900 mg in dextrose 5 % 250 mL infusion, 945 mg, Intravenous, Clinic/[facility] 1 time, 14 of 14 cycles  Administration: 900 mg (5/6/2020), 900 mg (5/20/2020), 900 mg (6/3/2020), 900 mg (6/17/2020), 945 mg (6/30/2020), 945 mg (7/20/2020), 945 mg (7/29/2020), 945 mg (8/12/2020), 945 mg (8/26/2020), 945 mg (9/9/2020), 945 mg (9/23/2020), 945 mg (10/6/2020), 945 mg (10/21/2020), 945 mg (11/4/2020)  oxaliplatin (ELOXATIN) 200 mg in dextrose 5 % 500 mL chemo infusion, 201 mg, Intravenous, Clinic/[facility] 1 time, 6 of 6 cycles  Dose modification: 65 mg/m2 (original dose 85 mg/m2, Cycle 6)  Administration: 200 mg (5/6/2020), 200 mg (5/20/2020), 200 mg (6/3/2020), 200 mg (6/17/2020), 201 mg (6/30/2020), 150 mg (7/20/2020)     6/16/2021 -  Chemotherapy    Treatment Summary   Plan Name: OP COLORECTAL FOLFIRI + BEVACIZUMAB Q2W  Treatment Goal: Control  Status: Active  Start Date: 6/16/2021  End Date: 6/17/2022  Provider: Marah Santo MD  Chemotherapy: fluorouraciL injection 990 mg, 400 mg/m2 = 990 mg, Intravenous, Clinic/[facility] 1 time, 15 of 15 cycles  Administration: 990 mg (6/16/2021), 990 mg (6/30/2021), 990 mg (7/14/2021), 980 mg (7/28/2021), 990 mg (8/11/2021), 990 mg (8/25/2021), 990 mg (9/8/2021), 990 mg (9/22/2021), 990 mg  (10/6/2021), 990 mg (10/20/2021), 990 mg (11/3/2021), 990 mg (12/1/2021), 990 mg (12/15/2021), 990 mg (11/17/2021), 990 mg (12/28/2021)  fluorouraciL 2,400 mg/m2 = 5,950 mg in sodium chloride 0.9% 240 mL chemo infusion, 2,400 mg/m2 = 5,950 mg, Intravenous, Over 46 hours, 25 of 25 cycles  Administration: 5,950 mg (6/16/2021), 5,950 mg (6/30/2021), 5,950 mg (7/14/2021), 5,880 mg (7/28/2021), 5,930 mg (8/11/2021), 5,930 mg (8/25/2021), 5,930 mg (9/8/2021), 5,930 mg (9/22/2021), 5,930 mg (10/6/2021), 5,930 mg (10/20/2021), 5,930 mg (11/3/2021), 5,930 mg (12/1/2021), 5,930 mg (12/15/2021), 5,930 mg (11/17/2021), 5,930 mg (12/28/2021), 6,050 mg (5/11/2022), 6,025 mg (3/9/2022), 6,025 mg (2/23/2022), 5,930 mg (2/2/2022), 5,930 mg (1/19/2022), 6,050 mg (4/20/2022), 6,025 mg (4/6/2022), 6,025 mg (3/23/2022), 6,050 mg (6/1/2022), 6,050 mg (6/15/2022)  bevacizumab (AVASTIN) 600 mg in sodium chloride 0.9% 100 mL chemo infusion, 660 mg, Intravenous, Clinic/hospitals 1 time, 23 of 23 cycles  Administration: 600 mg (6/30/2021), 600 mg (7/14/2021), 600 mg (7/28/2021), 600 mg (6/16/2021), 600 mg (8/11/2021), 655 mg (8/25/2021), 600 mg (9/8/2021), 600 mg (9/22/2021), 600 mg (10/6/2021), 600 mg (10/20/2021), 600 mg (11/3/2021), 655 mg (12/1/2021), 600 mg (12/15/2021), 600 mg (11/17/2021), 600 mg (12/28/2021), 600 mg (5/11/2022), 600 mg (3/9/2022), 600 mg (2/23/2022), 600 mg (2/2/2022), 600 mg (1/19/2022), 600 mg (4/20/2022), 680 mg (4/6/2022), 600 mg (3/23/2022)  irinotecan (CAMPTOSAR) 440 mg in sodium chloride 0.9% 500 mL chemo infusion, 446 mg, Intravenous, Clinic/hospitals 1 time, 25 of 25 cycles  Dose modification: 180 mg/m2 (original dose 180 mg/m2, Cycle 24)  Administration: 440 mg (6/16/2021), 440 mg (6/30/2021), 440 mg (7/14/2021), 440 mg (7/28/2021), 440 mg (8/11/2021), 444 mg (8/25/2021), 440 mg (9/8/2021), 440 mg (9/22/2021), 440 mg (10/6/2021), 440 mg (10/20/2021), 440 mg (11/3/2021), 440 mg (12/1/2021), 440 mg (12/15/2021), 440 mg  (2021), 440 mg (2021), 440 mg (2022), 440 mg (3/9/2022), 440 mg (2022), 440 mg (2022), 440 mg (2022), 440 mg (2022), 440 mg (2022), 440 mg (3/24/2022), 440 mg (2022), 440 mg (6/15/2022)  leucovorin calcium 400 mg/m2 = 990 mg in dextrose 5 % 250 mL infusion, 400 mg/m2 = 990 mg, Intravenous, [facility] 1 time, 25 of 25 cycles  Dose modification: 400 mg/m2 (original dose 400 mg/m2, Cycle 24)  Administration: 990 mg (2021), 990 mg (2021), 990 mg (2021), 980 mg (2021), 990 mg (2021), 990 mg (2021), 990 mg (2021), 990 mg (2021), 990 mg (10/6/2021), 990 mg (10/20/2021), 990 mg (11/3/2021), 990 mg (2021), 990 mg (12/15/2021), 990 mg (2021), 990 mg (2021), 1,010 mg (2022), 1,000 mg (3/9/2022), 1,000 mg (2022), 990 mg (2022), 990 mg (2022), 1,000 mg (2022), 1,000 mg (2022), 1,000 mg (3/24/2022), 1,010 mg (2022), 1,010 mg (6/15/2022)         History:  Past Medical History:   Diagnosis Date    Anxiety     Depression     FH: ovarian cancer 3/16/2020    Hx of psychiatric care     Effexor, Paxil, Lexapro, Zoloft, Wellbutrin, Trazodone Buspar    Hyperthyroidism     Hypothyroid     Kidney calculi     Malignant neoplasm of sigmoid colon 3/16/2020    Menorrhagia     Multinodular goiter 2012    Palpitation     Psychiatric problem     Venous insufficiency       Past Surgical History:   Procedure Laterality Date     SECTION, CLASSIC      x3    COLONOSCOPY N/A 2020    Procedure: COLONOSCOPY;  Surgeon: Shane Parker MD;  Location: [facility];  Service: Endoscopy;  Laterality: N/A;    CYSTOSCOPY W/ URETERAL STENT PLACEMENT Bilateral 3/25/2020    Procedure: CYSTOSCOPY, WITH URETERAL STENT INSERTION;  Surgeon: Claudio Tyson MD;  Location: [address];  Service: Urology;  Laterality: Bilateral;    INSERTION OF TUNNELED CENTRAL VENOUS CATHETER (CVC) WITH SUBCUTANEOUS  PORT N/A 5/1/2020    Procedure: [id number];  Surgeon: Stephen Diagnostic Provider;  Location: [facility] OR 2ND FLR;  Service: Radiology;  Laterality: N/A;  Room 189/Cindy    LAPAROSCOPIC SIGMOIDECTOMY N/A 3/25/2020    Procedure: COLECTOMY, SIGMOID, LAPAROSCOPIC, flex sig, ERAS high;  Surgeon: Silvio Man MD;  Location: [facility] OR 2ND F[facility];  Service: Colon and Rectal;  Laterality: N/A;    MOBILIZATION OF SPLENIC FLEXURE  3/25/2020    Procedure: MOBILIZATION, SPLENIC FLEXURE;  Surgeon: Silvio Man MD;  Location: [facility];  Service: Colon and Rectal;;    tonsillectomy      TOTAL ABDOMINAL HYSTERECTOMY W/ BILATERAL SALPINGOOPHORECTOMY N/A 3/25/2020    Procedure: HYSTERECTOMY, TOTAL, ABDOMINAL, WITH BILATERAL SALPINGO-OOPHORECTOMY;  Surgeon: Keron Brady MD;  Location: [facility] OR 2ND FLR;  Service: Oncology;  Laterality: N/A;     Family History   Problem Relation Age of Onset    Heart disease Father     Diabetes Mother 65    Drug abuse Brother     Drug abuse Brother     Cancer Maternal Aunt         lung cancer    Cancer Maternal Grandmother         stomach cancer- started in ovaries    Ovarian cancer Maternal Grandmother         stomach cancer- started in ovaries    Colon cancer Paternal Uncle     Cancer Paternal Uncle     Ovarian cancer Maternal Aunt     Ovarian cancer Maternal Aunt     Ovarian cancer Cousin         mother had ovarian cancer    Drug abuse Son     Drug abuse Son         clean/sober since 2012    Colon cancer Son     No Known Problems Daughter     Uterine cancer Neg Hx     Breast cancer Neg Hx      Social History     Tobacco Use    Smoking status: Never Smoker    Smokeless tobacco: Never Used   Substance and Sexual Activity    Alcohol use: Yes     Comment: occasionally- twice monthly    Drug use: Never    Sexual activity: Yes     Partners: Male     Birth control/protection: See Surgical Hx        ROS:   Review of Systems   Constitutional: Positive  "for malaise/fatigue and weight loss. Negative for fever.   HENT: Negative for congestion, hearing loss, nosebleeds and sore throat.    Eyes: Negative for double vision and photophobia.   Respiratory: Negative for cough, hemoptysis, sputum production, shortness of breath and wheezing.    Cardiovascular: Negative for chest pain, palpitations, orthopnea and leg swelling.   Gastrointestinal: Positive for diarrhea. Negative for abdominal pain, blood in stool, constipation, heartburn, nausea and vomiting.   Genitourinary: Negative for dysuria, hematuria and urgency.   Musculoskeletal: Negative for back pain, joint pain and myalgias.   Skin: Negative for itching and rash.   Neurological: Negative for dizziness, tingling, seizures, weakness and headaches.   Endo/Heme/Allergies: Negative for polydipsia. Does not bruise/bleed easily.   Psychiatric/Behavioral: Negative for depression and memory loss. The patient is not nervous/anxious and does not have insomnia.         Objective:     Vitals:    06/13/22 1355   BP: 110/70   Pulse: 87   Temp: 96.9 °F (36.1 °C)   TempSrc: Temporal   SpO2: 99%   Weight: 131.5 kg (289 lb 14.5 oz)   Height: 5' 6" (1.676 m)   PainSc: 0-No pain     Wt Readings from Last 10 Encounters:   06/15/22 134.5 kg (296 lb 8.3 oz)   06/15/22 134.5 kg (296 lb 8.3 oz)   06/13/22 131.5 kg (289 lb 14.5 oz)   06/06/22 132.5 kg (292 lb 1.8 oz)   06/06/22 132.5 kg (292 lb 1.8 oz)   06/01/22 135.9 kg (299 lb 9.7 oz)   05/30/22 135.9 kg (299 lb 9.7 oz)   05/13/22 (!) 136.6 kg (301 lb 2.4 oz)   05/11/22 (!) 136.6 kg (301 lb 2.4 oz)   05/09/22 (!) 136.6 kg (301 lb 2.4 oz)       Physical Examination:   Physical Exam  Vitals and nursing note reviewed.   Constitutional:       General: She is not in acute distress.     Appearance: She is not diaphoretic.   HENT:      Head: Normocephalic.      Mouth/Throat:      Pharynx: No oropharyngeal exudate.   Eyes:      General: No scleral icterus.     Conjunctiva/sclera: Conjunctivae " normal.   Neck:      Thyroid: No thyromegaly.   Cardiovascular:      Rate and Rhythm: Normal rate and regular rhythm.      Heart sounds: Normal heart sounds. No murmur heard.  Pulmonary:      Effort: Pulmonary effort is normal. No respiratory distress.      Breath sounds: No stridor. No wheezing or rales.   Chest:      Chest wall: No tenderness.   Abdominal:      General: Bowel sounds are normal. There is no distension.      Palpations: Abdomen is soft. There is no mass.      Tenderness: There is no abdominal tenderness. There is no rebound.   Musculoskeletal:         General: No tenderness or deformity. Normal range of motion.      Cervical back: Neck supple.   Lymphadenopathy:      Cervical: No cervical adenopathy.   Skin:     General: Skin is warm and dry.      Findings: No erythema or rash.   Neurological:      Mental Status: She is alert and oriented to person, place, and time.      Cranial Nerves: No cranial nerve deficit.      Coordination: Coordination normal.      Gait: Gait is intact.   Psychiatric:         Mood and Affect: Affect normal.         Cognition and Memory: Memory normal.         Judgment: Judgment normal.          Diagnostic Tests:  Significant Imaging: I have reviewed and interpreted all pertinent imaging results/findings.  Laboratory Data:  All pertinent labs have been reviewed.  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 Oxaliplatin was stopped due to severe adverse reaction.     Follow-up CTs and PET in May 2021 showed enlarging retroperitoneal node, concerning for metastatic disease.      She is tolerating FOLFIRI + Avastin well.    [facility] records and recommendations are reviewed.   She has had repeat imaging at [facility], stable disease and is deemed a surgical candidate.     This Wednesday would be her last cycle before surgery.  Given nausea and diarrhea, we will initiate supportive care.  See below.  She will be reassessed prior to her chemotherapy on Wednesday and if stable can proceed with chemo.  Otherwise, we will chemo interval after surgery.  I will see her back after her surgery and make further recommendations.    Diarrhea, unspecified type  Check fecal leukocytes in C diff.  If negative, can start taking Imodium as needed for diarrhea.  Give IV fluids today.  -     WBC, Stool; Future; Expected date: 06/13/2022  -     C Diff Toxin by PCR  -     CULTURE, STOOL; Future; Expected date: 06/13/2022  -     STOOL FOR C DIFF; Future; Expected date: 06/13/2022    Hypokalemia  -     potassium chloride SA (K-DUR,KLOR-CON) 20 MEQ tablet; Take 1 tablet (20 mEq total) by mouth once daily. for 3 days  Dispense: 3 tablet; Refill: 1  -     BMP; Future; Expected date: 06/13/2022    Transaminitis  Stable.  Continue to monitor.    Nausea  Optimize antiemetics.  Continue lorazepam and Sancuso patch in addition to routine antiemetics.     ECOG SCORE    1 - Restricted in strenuous activity-ambulatory and able to carry out work of a light nature           Discussion:   No follow-ups on file.    Plan was discussed with the patient at length, and she verbalized understanding. Gail was given an opportunity to ask questions that were answered to  her satisfaction, and she was advised to call in the interval if any problems or questions arise.    Electronically signed by Marah Santo MD      Route Chart for Scheduling    Med Onc Chart Routing      Follow up with physician . Fluids and KCL today. Sent message to infusion. keep chemo appt, will reassess after Wednesday labs. see me 8/1/22   Follow up with TAVO . 6/15, see Vinod or Ophelia after labs to see if can get chemo    Labs    Lab interval:  Stool leukocytes, c diff today, BMP on Wednesday morning    Imaging    Pharmacy appointment    Other referrals          Treatment Plan Information   OP COLORECTAL FOLFIRI + BEVACIZUMAB Q2W   Marah Santo MD   Upcoming Treatment Dates - OP COLORECTAL FOLFIRI + BEVACIZUMAB Q2W    No upcoming days in selected categories.    Supportive Plan Information  IV FLUIDS AND ELECTROLYTES   Brayden Solo MD   Upcoming Treatment Dates - IV FLUIDS AND ELECTROLYTES    No upcoming days in selected categories.    Therapy Plan Information  PORT FLUSH  Flushes  heparin, porcine (PF) 100 unit/mL injection flush 500 Units  500 Units, Intravenous, Every visit  sodium chloride 0.9% flush 10 mL  10 mL, Intravenous, Every visit

## 2022-06-13 NOTE — PLAN OF CARE
Tolerated K+ infusions well.  No reactions noted.  No questions or concerns at this time.  Ambulated off unit in NAD.

## 2022-06-14 LAB
C DIFF GDH STL QL: NEGATIVE
C DIFF TOX A+B STL QL IA: NEGATIVE
E COLI SXT1 STL QL IA: NEGATIVE
E COLI SXT2 STL QL IA: NEGATIVE

## 2022-06-15 ENCOUNTER — INFUSION (OUTPATIENT)
Dept: INFUSION THERAPY | Facility: HOSPITAL | Age: 52
End: 2022-06-15
Attending: INTERNAL MEDICINE
Payer: COMMERCIAL

## 2022-06-15 ENCOUNTER — LAB VISIT (OUTPATIENT)
Dept: LAB | Facility: HOSPITAL | Age: 52
End: 2022-06-15
Attending: INTERNAL MEDICINE
Payer: COMMERCIAL

## 2022-06-15 ENCOUNTER — OFFICE VISIT (OUTPATIENT)
Dept: HEMATOLOGY/ONCOLOGY | Facility: CLINIC | Age: 52
End: 2022-06-15
Payer: COMMERCIAL

## 2022-06-15 ENCOUNTER — TELEPHONE (OUTPATIENT)
Dept: HEMATOLOGY/ONCOLOGY | Facility: CLINIC | Age: 52
End: 2022-06-15
Payer: COMMERCIAL

## 2022-06-15 VITALS
BODY MASS INDEX: 47.09 KG/M2 | SYSTOLIC BLOOD PRESSURE: 100 MMHG | OXYGEN SATURATION: 98 % | TEMPERATURE: 98 F | HEART RATE: 79 BPM | DIASTOLIC BLOOD PRESSURE: 70 MMHG | HEIGHT: 66 IN | WEIGHT: 293 LBS

## 2022-06-15 VITALS
SYSTOLIC BLOOD PRESSURE: 149 MMHG | RESPIRATION RATE: 18 BRPM | BODY MASS INDEX: 47.09 KG/M2 | HEIGHT: 66 IN | TEMPERATURE: 98 F | HEART RATE: 94 BPM | WEIGHT: 293 LBS | DIASTOLIC BLOOD PRESSURE: 81 MMHG

## 2022-06-15 DIAGNOSIS — T45.1X5A CHEMOTHERAPY-INDUCED DIARRHEA: ICD-10-CM

## 2022-06-15 DIAGNOSIS — C18.7 MALIGNANT NEOPLASM OF SIGMOID COLON: Primary | ICD-10-CM

## 2022-06-15 DIAGNOSIS — C77.2 METASTASIS TO INTESTINAL LYMPH NODE: Primary | ICD-10-CM

## 2022-06-15 DIAGNOSIS — E87.6 HYPOKALEMIA: ICD-10-CM

## 2022-06-15 DIAGNOSIS — C77.2 METASTASIS TO INTESTINAL LYMPH NODE: ICD-10-CM

## 2022-06-15 DIAGNOSIS — R11.0 NAUSEA: ICD-10-CM

## 2022-06-15 DIAGNOSIS — C18.7 MALIGNANT NEOPLASM OF SIGMOID COLON: ICD-10-CM

## 2022-06-15 DIAGNOSIS — C77.9 SECONDARY ADENOCARCINOMA OF LYMPH NODE: ICD-10-CM

## 2022-06-15 DIAGNOSIS — K52.1 CHEMOTHERAPY-INDUCED DIARRHEA: ICD-10-CM

## 2022-06-15 LAB
ALBUMIN SERPL BCP-MCNC: 3.2 G/DL (ref 3.5–5.2)
ALP SERPL-CCNC: 101 U/L (ref 55–135)
ALT SERPL W/O P-5'-P-CCNC: 69 U/L (ref 10–44)
ANION GAP SERPL CALC-SCNC: 6 MMOL/L (ref 8–16)
ANION GAP SERPL CALC-SCNC: 6 MMOL/L (ref 8–16)
AST SERPL-CCNC: 47 U/L (ref 10–40)
BILIRUB SERPL-MCNC: 0.2 MG/DL (ref 0.1–1)
BUN SERPL-MCNC: 7 MG/DL (ref 6–20)
BUN SERPL-MCNC: 7 MG/DL (ref 6–20)
CALCIUM SERPL-MCNC: 10.1 MG/DL (ref 8.7–10.5)
CALCIUM SERPL-MCNC: 10.1 MG/DL (ref 8.7–10.5)
CHLORIDE SERPL-SCNC: 114 MMOL/L (ref 95–110)
CHLORIDE SERPL-SCNC: 114 MMOL/L (ref 95–110)
CO2 SERPL-SCNC: 23 MMOL/L (ref 23–29)
CO2 SERPL-SCNC: 23 MMOL/L (ref 23–29)
CREAT SERPL-MCNC: 0.8 MG/DL (ref 0.5–1.4)
CREAT SERPL-MCNC: 0.8 MG/DL (ref 0.5–1.4)
EST. GFR  (AFRICAN AMERICAN): >60 ML/MIN/1.73 M^2
EST. GFR  (AFRICAN AMERICAN): >60 ML/MIN/1.73 M^2
EST. GFR  (NON AFRICAN AMERICAN): >60 ML/MIN/1.73 M^2
EST. GFR  (NON AFRICAN AMERICAN): >60 ML/MIN/1.73 M^2
GLUCOSE SERPL-MCNC: 96 MG/DL (ref 70–110)
GLUCOSE SERPL-MCNC: 96 MG/DL (ref 70–110)
POTASSIUM SERPL-SCNC: 3.5 MMOL/L (ref 3.5–5.1)
POTASSIUM SERPL-SCNC: 3.5 MMOL/L (ref 3.5–5.1)
PROT SERPL-MCNC: 6.1 G/DL (ref 6–8.4)
SODIUM SERPL-SCNC: 143 MMOL/L (ref 136–145)
SODIUM SERPL-SCNC: 143 MMOL/L (ref 136–145)

## 2022-06-15 PROCEDURE — 63600175 PHARM REV CODE 636 W HCPCS: Mod: PN

## 2022-06-15 PROCEDURE — 99999 PR PBB SHADOW E&M-EST. PATIENT-LVL IV: CPT | Mod: PBBFAC,,,

## 2022-06-15 PROCEDURE — 1159F PR MEDICATION LIST DOCUMENTED IN MEDICAL RECORD: ICD-10-PCS | Mod: CPTII,S$GLB,,

## 2022-06-15 PROCEDURE — 3008F BODY MASS INDEX DOCD: CPT | Mod: CPTII,S$GLB,,

## 2022-06-15 PROCEDURE — 99214 OFFICE O/P EST MOD 30 MIN: CPT | Mod: S$GLB,,,

## 2022-06-15 PROCEDURE — 96367 TX/PROPH/DG ADDL SEQ IV INF: CPT | Mod: PN

## 2022-06-15 PROCEDURE — 3008F PR BODY MASS INDEX (BMI) DOCUMENTED: ICD-10-PCS | Mod: CPTII,S$GLB,,

## 2022-06-15 PROCEDURE — 99214 PR OFFICE/OUTPT VISIT, EST, LEVL IV, 30-39 MIN: ICD-10-PCS | Mod: S$GLB,,,

## 2022-06-15 PROCEDURE — 3074F PR MOST RECENT SYSTOLIC BLOOD PRESSURE < 130 MM HG: ICD-10-PCS | Mod: CPTII,S$GLB,,

## 2022-06-15 PROCEDURE — 80053 COMPREHEN METABOLIC PANEL: CPT | Mod: PN | Performed by: STUDENT IN AN ORGANIZED HEALTH CARE EDUCATION/TRAINING PROGRAM

## 2022-06-15 PROCEDURE — 96416 CHEMO PROLONG INFUSE W/PUMP: CPT | Mod: PN

## 2022-06-15 PROCEDURE — 3078F DIAST BP <80 MM HG: CPT | Mod: CPTII,S$GLB,,

## 2022-06-15 PROCEDURE — 1159F MED LIST DOCD IN RCRD: CPT | Mod: CPTII,S$GLB,,

## 2022-06-15 PROCEDURE — 96368 THER/DIAG CONCURRENT INF: CPT | Mod: PN

## 2022-06-15 PROCEDURE — 96375 TX/PRO/DX INJ NEW DRUG ADDON: CPT | Mod: PN

## 2022-06-15 PROCEDURE — 99999 PR PBB SHADOW E&M-EST. PATIENT-LVL IV: ICD-10-PCS | Mod: PBBFAC,,,

## 2022-06-15 PROCEDURE — 96413 CHEMO IV INFUSION 1 HR: CPT | Mod: PN

## 2022-06-15 PROCEDURE — 36415 COLL VENOUS BLD VENIPUNCTURE: CPT | Mod: PN | Performed by: STUDENT IN AN ORGANIZED HEALTH CARE EDUCATION/TRAINING PROGRAM

## 2022-06-15 PROCEDURE — 3078F PR MOST RECENT DIASTOLIC BLOOD PRESSURE < 80 MM HG: ICD-10-PCS | Mod: CPTII,S$GLB,,

## 2022-06-15 PROCEDURE — 25000003 PHARM REV CODE 250: Mod: PN

## 2022-06-15 PROCEDURE — 3074F SYST BP LT 130 MM HG: CPT | Mod: CPTII,S$GLB,,

## 2022-06-15 PROCEDURE — 96415 CHEMO IV INFUSION ADDL HR: CPT | Mod: PN

## 2022-06-15 RX ORDER — SODIUM CHLORIDE 0.9 % (FLUSH) 0.9 %
10 SYRINGE (ML) INJECTION
Status: CANCELLED | OUTPATIENT
Start: 2022-06-17

## 2022-06-15 RX ORDER — HEPARIN 100 UNIT/ML
500 SYRINGE INTRAVENOUS
Status: CANCELLED | OUTPATIENT
Start: 2022-06-15

## 2022-06-15 RX ORDER — ATROPINE SULFATE 0.4 MG/ML
0.4 INJECTION, SOLUTION ENDOTRACHEAL; INTRAMEDULLARY; INTRAMUSCULAR; INTRAVENOUS; SUBCUTANEOUS ONCE AS NEEDED
Status: COMPLETED | OUTPATIENT
Start: 2022-06-15 | End: 2022-06-15

## 2022-06-15 RX ORDER — HEPARIN 100 UNIT/ML
500 SYRINGE INTRAVENOUS
Status: DISCONTINUED | OUTPATIENT
Start: 2022-06-15 | End: 2022-07-08 | Stop reason: HOSPADM

## 2022-06-15 RX ORDER — HEPARIN 100 UNIT/ML
500 SYRINGE INTRAVENOUS
Status: CANCELLED | OUTPATIENT
Start: 2022-06-17

## 2022-06-15 RX ORDER — SODIUM CHLORIDE 0.9 % (FLUSH) 0.9 %
10 SYRINGE (ML) INJECTION
Status: CANCELLED | OUTPATIENT
Start: 2022-06-15

## 2022-06-15 RX ORDER — LORAZEPAM 2 MG/ML
1 INJECTION INTRAMUSCULAR
Status: DISCONTINUED | OUTPATIENT
Start: 2022-06-15 | End: 2022-07-08 | Stop reason: HOSPADM

## 2022-06-15 RX ORDER — LORAZEPAM 2 MG/ML
1 INJECTION INTRAMUSCULAR
Status: CANCELLED | OUTPATIENT
Start: 2022-06-15 | End: 2022-06-15

## 2022-06-15 RX ORDER — SODIUM CHLORIDE 0.9 % (FLUSH) 0.9 %
10 SYRINGE (ML) INJECTION
Status: DISCONTINUED | OUTPATIENT
Start: 2022-06-15 | End: 2022-07-08 | Stop reason: HOSPADM

## 2022-06-15 RX ORDER — ATROPINE SULFATE 0.4 MG/ML
0.4 INJECTION, SOLUTION ENDOTRACHEAL; INTRAMEDULLARY; INTRAMUSCULAR; INTRAVENOUS; SUBCUTANEOUS ONCE AS NEEDED
Status: CANCELLED | OUTPATIENT
Start: 2022-06-15

## 2022-06-15 RX ADMIN — ATROPINE SULFATE 0.4 MG: 0.4 INJECTION, SOLUTION INTRAMUSCULAR; INTRAVENOUS; SUBCUTANEOUS at 02:06

## 2022-06-15 RX ADMIN — SODIUM CHLORIDE 440 MG: 0.9 INJECTION, SOLUTION INTRAVENOUS at 02:06

## 2022-06-15 RX ADMIN — FLUOROURACIL 6050 MG: 50 INJECTION, SOLUTION INTRAVENOUS at 04:06

## 2022-06-15 RX ADMIN — PALONOSETRON 0.25 MG: 0.05 INJECTION, SOLUTION INTRAVENOUS at 02:06

## 2022-06-15 RX ADMIN — SODIUM CHLORIDE: 9 INJECTION, SOLUTION INTRAVENOUS at 02:06

## 2022-06-15 RX ADMIN — LEUCOVORIN CALCIUM 1010 MG: 350 INJECTION, POWDER, LYOPHILIZED, FOR SOLUTION INTRAMUSCULAR; INTRAVENOUS at 02:06

## 2022-06-15 NOTE — TELEPHONE ENCOUNTER
Patient advised of below and verbalized understanding.----- Message from Marah Santo MD sent at 6/15/2022  2:02 PM CDT -----  Please let her know that there is no infection in her stool and that her potassium is now normal. Thanks

## 2022-06-15 NOTE — PROGRESS NOTES
PATIENT: Gail Mckinnon  MRN: [id number]  DATE: 6/15/2022      Diagnosis:   1. Malignant neoplasm of sigmoid colon    2. 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 Nausea    4. Hypokalemia    5. Chemotherapy-induced diarrhea        Chief Complaint: Colon Cancer (Malignant neoplasm of sigmoid colon; 1 week follow up )      Subjective:   HPI: Ms. Mckinnon is a 52 y.o. female  patient known to Dr. Santo for diagnosis of colon cancer, initially stage III and now with LN recurrence.      She completed adjuvant FOLFOX in November 2020. She tolerated only 4 cycles of FOLFOX before she developed an infusion reaction to oxaliplatin in cycle 5 was well as cycle 6. She then completed 6 cycles of infusional 5 FU.     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 She is now on second line therapy with FOLFIRI and Avastin     4/18/22 PET scan mid April that showed possible progression of her disease with enlarging lymphadenopahty from 1.7cm to 1.9 cm, no new lesion.     For now, we have continued FOLFIRI+ Avastin with plan to repeat short term imaging. She has been to [facility] for another opinion. No change was recommended in systemic therapy but she was called back to discuss surgical options.      She saw surgical oncology on 5/28 and they recommend surgical removal of the aorta caval nodes.  Recent sans at [facility]  show stable disease.    Surgery due 7/12/22. Plan for one more cycle of FOLFIRI without Avastin on 6/15/22.     Interim:    I saw the patient on 6/8 for nausea, diarrhea and abdominal cramping. She was given supplemental IV fluids and supportive care. Was given appointment to follow up on these symptoms but felt she was doing well enough pushing PO fluids and diarrhea had slowed; she opted to manage at home.     Continued to have several loose, watery stools each day. Followed up with Dr. Santo on 6/13 and Potassium was low at 3.1 that day. She was given IV fluids and Potassium. Given a script  for PO Potassium x 3 days; has one dose remaining at home.  Tells me the diarrhea has slowed, she felt much better on Tuesday after getting the IV fluids. Nausea has resolved, she did apply sancuso patch for this cycle.   Denies HA, CP, cough, SOB, abd pain, dysuria, swelling, fevers, chills, new lumps or bumps.     Past Medical History:   Past Medical History:   Diagnosis Date    Anxiety     Depression     FH: ovarian cancer 3/16/2020    Hx of psychiatric care     Effexor, Paxil, Lexapro, Zoloft, Wellbutrin, Trazodone Buspar    Hyperthyroidism     Hypothyroid     Kidney calculi     Malignant neoplasm of sigmoid colon 3/16/2020    Menorrhagia     Multinodular goiter 2012    Palpitation     Psychiatric problem     Venous insufficiency        Past Surgical HIstory:   Past Surgical History:   Procedure Laterality Date     SECTION, CLASSIC      x3    COLONOSCOPY N/A 2020    Procedure: COLONOSCOPY;  Surgeon: Shane Parker MD;  Location: [facility];  Service: Endoscopy;  Laterality: N/A;    CYSTOSCOPY W/ URETERAL STENT PLACEMENT Bilateral 3/25/2020    Procedure: CYSTOSCOPY, WITH URETERAL STENT INSERTION;  Surgeon: Claudio Tyson MD;  Location: [address];  Service: Urology;  Laterality: Bilateral;    INSERTION OF TUNNELED CENTRAL VENOUS CATHETER (CVC) WITH SUBCUTANEOUS PORT N/A 2020    Procedure: [id number];  [facility];  Location: [address];  Service: Radiology;  Laterality: N/A;  Room 189/[facility]    LAPAROSCOPIC SIGMOIDECTOMY N/A 3/25/2020    Procedure: COLECTOMY, SIGMOID, LAPAROSCOPIC, flex sig, ERAS high;  Surgeon: Silvio Man MD;  Location: [address];  Service: Colon and Rectal;  Laterality: N/A;    MOBILIZATION OF SPLENIC FLEXURE  3/25/2020    Procedure: MOBILIZATION, SPLENIC FLEXURE;  Surgeon: Silvio Man MD;  Location: [address];  Service: Colon and Rectal;;    tonsillectomy      TOTAL  ABDOMINAL HYSTERECTOMY W/ BILATERAL SALPINGOOPHORECTOMY N/A 3/25/2020    Procedure: HYSTERECTOMY, TOTAL, ABDOMINAL, WITH BILATERAL SALPINGO-OOPHORECTOMY;  Surgeon: Keron Brday MD;  Location: [facility] OR [address] [place] [address];  Service: Oncology;  Laterality: N/A;       Family History:   Family History   Problem Relation Age of Onset    Heart disease Father     Diabetes Mother 65    Drug abuse Brother     Drug abuse Brother     Cancer Maternal Aunt         lung cancer    Cancer Maternal Grandmother         stomach cancer- started in ovaries    Ovarian cancer Maternal Grandmother         stomach cancer- started in ovaries    Colon cancer Paternal Uncle     Cancer Paternal Uncle     Ovarian cancer Maternal Aunt     Ovarian cancer Maternal Aunt     Ovarian cancer Cousin         mother had ovarian cancer    Drug abuse Son     Drug abuse Son         clean/sober since 2012    Colon cancer Son     No Known Problems Daughter     Uterine cancer Neg Hx     Breast cancer Neg Hx        Social History:  reports that she has never smoked. She has never used smokeless tobacco. She reports current alcohol use. She reports that she does not use drugs.    Allergies:  Review of patient's allergies indicates:   Allergen Reactions    Oxaliplatin Other (See Comments)     Itchy hands, throat closed up, trouble hearing - during infusion    Penicillin g      unknown    Penicillins Hives and Rash     childhood allergy  childhood allergy  unknown       Medications:  Current Outpatient Medications   Medication Sig Dispense Refill    acetaminophen (TYLENOL) 500 MG tablet Take 2 tablets (1,000 mg total) by mouth every 8 (eight) hours. 60 tablet 0    buPROPion (WELLBUTRIN SR) 150 MG TBSR 12 hr tablet Take 1 tablet (150 mg total) by mouth 2 (two) times daily. (Patient taking differently: Take 150 mg by mouth once daily.) 180 tablet 0    busPIRone (BUSPAR) 10 MG tablet Take 1 tablet (10 mg total) by mouth 2 (two) times daily. 180 tablet 0     cyclobenzaprine (FLEXERIL) 10 MG tablet Take 1 tablet (10 mg total) by mouth nightly. 30 tablet 0    duke's soln (benadryl 30 mL, mylanta 30 mL, LIDOcaine 30 mL, nystatin 30 mL) 120mL 5 ml swish and swallow every 4 hours as needed for mouth pain/ulcers 120 mL 0    granisetron (SANCUSO) 3.1 mg/24 hour Place 1 patch (3.1 mg total) onto the skin every 7 days AS DIRECTED. 4 patch 3    Lactobacillus rhamnosus GG (CULTURELLE) 10 billion cell capsule Take 1 capsule by mouth once daily.      lactulose (CHRONULAC) 10 gram/15 mL solution Take 30 mLs (20 g total) by mouth daily as needed (constipation). 500 mL 2    levothyroxine (SYNTHROID) 200 MCG tablet Take 1 tablet (200 mcg total) by mouth once daily. 90 tablet 3    LIDOcaine-prilocaine (EMLA) cream Apply topically as needed (30 to 60 min prior chemo or port use). 30 g 3    LORazepam (ATIVAN) 1 MG tablet Take 1 tablet (1 mg total) by mouth every 12 (twelve) hours as needed for Anxiety (nausea). 30 tablet 0    multivitamin (THERAGRAN) per tablet Take 1 tablet by mouth once daily.      ondansetron (ZOFRAN-ODT) 8 MG TbDL Take 1 tablet (8 mg total) by mouth every 8 (eight) hours as needed. 60 tablet 3    potassium chloride SA (K-DUR,KLOR-CON) 20 MEQ tablet Take 1 tablet (20 mEq total) by mouth once daily. for 3 days 3 tablet 1    promethazine (PHENERGAN) 12.5 MG Tab (Take 1-2 tabs every 6 hours as needed for nausea persistent despite zofran) 40 tablet 3    traMADoL (ULTRAM) 50 mg tablet Take 1 tablet (50 mg total) by mouth 3 (three) times daily as needed for Pain. 90 tablet 0    traZODone (DESYREL) 50 MG tablet Take 1 tablet (50 mg total) by mouth nightly. 30 tablet 2    tamsulosin (FLOMAX) 0.4 mg Cap Take 1 capsule (0.4 mg total) by mouth once daily. for 14 days 14 capsule 0     No current facility-administered medications for this visit.     Facility-Administered Medications Ordered in Other Visits   Medication Dose Route Frequency Provider Last Rate Last  Admin    alteplase injection 2 mg  2 mg Intra-Catheter PRN Ophelia Moreno NP        fluorouracil (ADRUCIL) 2,400 mg/m2 = 6,050 mg in sodium chloride 0.9% 240 mL chemo infusion  2,400 mg/m2 (Treatment Plan Recorded) Intravenous over 46 hr Ophelia Moreno NP        heparin, porcine (PF) 100 unit/mL injection flush 500 Units  500 Units Intravenous PRN Ophelia Moreno NP        irinotecan (CAMPTOSAR) 440 mg in sodium chloride 0.9% 500 mL chemo infusion  440 mg Intravenous 1 time in [facility] Ophelia Moreno .3 mL/hr at 06/15/22 1458 440 mg at 06/15/22 1458    leucovorin calcium 400 mg/m2 = 1,010 mg in dextrose 5 % 250 mL infusion  400 mg/m2 (Treatment Plan Recorded) Intravenous 1 time in [facility] Ophelia Moreno .7 mL/hr at 06/15/22 1457 1,010 mg at 06/15/22 1457    LORazepam injection 1 mg  1 mg Intravenous 1 time in [facility] Ophelia Moreno NP        promethazine (PHENERGAN) 6.25 mg in dextrose 5 % 100 mL IVPB  6.25 mg Intravenous 1 time in [facility] Ophelia Moreno NP        sodium chloride 0.9% 250 mL flush bag   Intravenous 1 time in [facility] Ophelia LUISA Moreno        sodium chloride 0.9% flush 10 mL  10 mL Intravenous PRN Ophelia Moreno NP           Review of Systems   Constitutional: Negative for appetite change and unexpected weight change.   HENT: Negative for mouth sores.    Eyes: Negative for visual disturbance.   Respiratory: Negative for cough and shortness of breath.    Cardiovascular: Negative for chest pain.   Gastrointestinal: Positive for diarrhea. Negative for abdominal pain.   Genitourinary: Negative for frequency.   Musculoskeletal: Negative for back pain.   Skin: Negative for rash.   Neurological: Negative for headaches.   Hematological: Negative for adenopathy.   Psychiatric/Behavioral: The patient is not nervous/anxious.        ECOG Performance Status:   ECOG SCORE           Objective:      Vitals:   Vitals:    06/15/22 1323   BP: 100/70   BP Location: Left arm   Patient Position:  "Sitting   BP Method: Large (Manual)   Pulse: 79   Temp: 97.6 °F (36.4 °C)   TempSrc: Temporal   SpO2: 98%   Weight: 134.5 kg (296 lb 8.3 oz)   Height: 5' 6" (1.676 m)     BMI: Body mass index is 47.86 kg/m².    Physical Exam  Vitals reviewed.   Constitutional:       General: She is not in acute distress.     Appearance: She is not diaphoretic.   HENT:      Head: Normocephalic and atraumatic.   Eyes:      General: No scleral icterus.  Cardiovascular:      Rate and Rhythm: Normal rate and regular rhythm.      Heart sounds: Normal heart sounds. No murmur heard.  Pulmonary:      Effort: No respiratory distress.      Breath sounds: Normal breath sounds.   Abdominal:      General: Bowel sounds are normal. There is no distension.   Musculoskeletal:      Right lower leg: No edema.      Left lower leg: No edema.   Neurological:      Mental Status: She is alert and oriented to person, place, and time.      Gait: Gait normal.   Psychiatric:         Mood and Affect: Mood normal.         Behavior: Behavior normal.         Laboratory Data:  Lab Results   Component Value Date    WBC 6.43 06/13/2022    HGB 15.1 06/13/2022    HCT 46.6 06/13/2022    MCV 95 06/13/2022     06/13/2022        Assessment:       1. Malignant neoplasm of sigmoid colon    2. 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 Nausea    4. Hypokalemia    5. 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 Oxaliplatin was stopped due to severe adverse reaction.  -Follow-up CTs and PET in May 2021 showed enlarging retroperitoneal node, concerning for metastatic disease.   -She is tolerating FOLFIRI + Avastin well. Most recent PET scan showed slight " enlargement of one lymph node, does not meet criteria for disease progression.   -[facility] records and recommendations are reviewed.   -She has had repeat imaging at [facility], stable disease and is deemed a surgical candidate.   -Surgery due 7/12/22. Proceed with one more cycle of FOLFIRI without Avastin on 6/15/22  -Follow up with Dr. Santo postoperatively     Nausea  -Continue PO Phenergan and Ondansetron PRN  -Applied Sancuso this cycle   -Will monitor     Diarrhea   -Has improved, take Imodium PRN   -Call if continues, will offer supportive fluids   -will monitor     Hypokalemia   -Improved today at 3.5  -Complete 3 days of PO supplements today  -Will monitor    Patient queried and all questions were answered.  Assessment/Plan reviewed and approved by Dr. Santo     Route Chart for Scheduling    Med Onc Chart Routing      Follow up with physician Other. Proceed with FOLFIRI today; cancel treatment on 6/29; keep f/u with Dr. Santo on 8/1/22 as planned    Follow up with [facility]    Labs    Imaging    Pharmacy appointment No pharmacy appointment needed      Other referrals No additional referrals needed         Treatment Plan Information   OP COLORECTAL FOLFIRI + BEVACIZUMAB Q2W   Marah Santo MD   Upcoming Treatment Dates - OP COLORECTAL FOLFIRI + BEVACIZUMAB Q2W    No upcoming days in selected categories.    Supportive Plan Information  IV FLUIDS AND ELECTROLYTES   Brayden Solo MD   Upcoming Treatment Dates - IV FLUIDS AND ELECTROLYTES    No upcoming days in selected categories.    Therapy Plan Information  PORT FLUSH  Flushes  heparin, porcine (PF) 100 unit/mL injection flush 500 Units  500 Units, Intravenous, Every visit  sodium chloride 0.9% flush 10 mL  10 mL, Intravenous, Every visit

## 2022-06-15 NOTE — PLAN OF CARE
Pt tolerated FOLFIRI well today. Blood return noted prior to pump connection, all connections secured with coban, pt has spill kit and tip sheet at home from previous infusions. Verified pump infusing prior to d/c.  Reviewed follow-up appointments. All questions were answered, ambulated independently at d/c.

## 2022-06-15 NOTE — PROGRESS NOTES
Please let her know that there is no infection in her stool and that her potassium is now normal. Thanks

## 2022-06-16 ENCOUNTER — TELEPHONE (OUTPATIENT)
Dept: GASTROENTEROLOGY | Facility: CLINIC | Age: 52
End: 2022-06-16
Payer: COMMERCIAL

## 2022-06-16 NOTE — TELEPHONE ENCOUNTER
"Spoke with pt. Verified pt still has prep instructions for upcoming procedure with Dr. Parker. Pt stated they do. Pt informed surgery center will call day or two prior with arrival time. Pt verbalized understanding to all.       Pt was scheduled for a c-scope. At the time of scheduling, pt was given all options for covid testing per [facility] policy. Pt opted to do a home test. Upon calling pt today to verify pt still had her prep instructions & verify she still wanted to do a home covid test, pt states "someone from your surgery center has already called me. I was told to do the home test before I start my prep & they would do a rapid when I come in that morning." I asked the pt if that is what she wanted to do or if she only wanted to do the home test as this is still the current policy. Pt only wanted home test. [facility] informed of what pt wanted.   "

## 2022-06-17 ENCOUNTER — INFUSION (OUTPATIENT)
Dept: INFUSION THERAPY | Facility: HOSPITAL | Age: 52
End: 2022-06-17
Attending: INTERNAL MEDICINE
Payer: COMMERCIAL

## 2022-06-17 VITALS
RESPIRATION RATE: 20 BRPM | OXYGEN SATURATION: 98 % | WEIGHT: 288.81 LBS | SYSTOLIC BLOOD PRESSURE: 150 MMHG | TEMPERATURE: 98 F | DIASTOLIC BLOOD PRESSURE: 88 MMHG | BODY MASS INDEX: 46.41 KG/M2 | HEART RATE: 84 BPM | HEIGHT: 66 IN

## 2022-06-17 DIAGNOSIS — C18.7 MALIGNANT NEOPLASM OF SIGMOID COLON: ICD-10-CM

## 2022-06-17 DIAGNOSIS — C77.2 METASTASIS TO INTESTINAL LYMPH NODE: Primary | ICD-10-CM

## 2022-06-17 LAB — BACTERIA STL CULT: NORMAL

## 2022-06-17 PROCEDURE — 25000003 PHARM REV CODE 250: Mod: PN

## 2022-06-17 PROCEDURE — A4216 STERILE WATER/SALINE, 10 ML: HCPCS | Mod: PN

## 2022-06-17 PROCEDURE — 63600175 PHARM REV CODE 636 W HCPCS: Mod: PN

## 2022-06-17 RX ORDER — SODIUM CHLORIDE 0.9 % (FLUSH) 0.9 %
10 SYRINGE (ML) INJECTION
Status: DISCONTINUED | OUTPATIENT
Start: 2022-06-17 | End: 2022-06-17 | Stop reason: HOSPADM

## 2022-06-17 RX ORDER — HEPARIN 100 UNIT/ML
500 SYRINGE INTRAVENOUS
Status: DISCONTINUED | OUTPATIENT
Start: 2022-06-17 | End: 2022-06-17 | Stop reason: HOSPADM

## 2022-06-17 RX ADMIN — Medication 10 ML: at 02:06

## 2022-06-17 RX ADMIN — Medication 500 UNITS: at 02:06

## 2022-06-21 ENCOUNTER — HOSPITAL ENCOUNTER (OUTPATIENT)
Facility: HOSPITAL | Age: 52
Discharge: HOME | End: 2022-06-21
Attending: INTERNAL MEDICINE | Admitting: INTERNAL MEDICINE
Payer: COMMERCIAL

## 2022-06-21 ENCOUNTER — ANESTHESIA EVENT (OUTPATIENT)
Dept: ENDOSCOPY | Facility: HOSPITAL | Age: 52
End: 2022-06-21
Payer: COMMERCIAL

## 2022-06-21 ENCOUNTER — ANESTHESIA (OUTPATIENT)
Dept: ENDOSCOPY | Facility: HOSPITAL | Age: 52
End: 2022-06-21
Payer: COMMERCIAL

## 2022-06-21 DIAGNOSIS — Z01.818 PREOP TESTING: Primary | ICD-10-CM

## 2022-06-21 DIAGNOSIS — Z85.038 HX OF MALIGNANT NEOPLASM OF COLON: ICD-10-CM

## 2022-06-21 LAB
CTP QC/QA: YES
SARS-COV-2 AG RESP QL IA.RAPID: NEGATIVE

## 2022-06-21 PROCEDURE — 25000003 PHARM REV CODE 250: Mod: PO | Performed by: NURSE ANESTHETIST, CERTIFIED REGISTERED

## 2022-06-21 PROCEDURE — 63600175 PHARM REV CODE 636 W HCPCS: Mod: PO | Performed by: INTERNAL MEDICINE

## 2022-06-21 PROCEDURE — G0105 COLORECTAL SCRN; HI RISK IND: HCPCS | Mod: ,,, | Performed by: INTERNAL MEDICINE

## 2022-06-21 PROCEDURE — 87811 SARS-COV-2 COVID19 W/OPTIC: CPT | Mod: PO | Performed by: INTERNAL MEDICINE

## 2022-06-21 PROCEDURE — G0105 COLORECTAL SCRN; HI RISK IND: ICD-10-PCS | Mod: ,,, | Performed by: INTERNAL MEDICINE

## 2022-06-21 PROCEDURE — D9220A PRA ANESTHESIA: ICD-10-PCS | Mod: ANES,,, | Performed by: ANESTHESIOLOGY

## 2022-06-21 PROCEDURE — D9220A PRA ANESTHESIA: Mod: ANES,,, | Performed by: ANESTHESIOLOGY

## 2022-06-21 PROCEDURE — G0105 COLORECTAL SCRN; HI RISK IND: HCPCS | Mod: PO | Performed by: INTERNAL MEDICINE

## 2022-06-21 PROCEDURE — 63600175 PHARM REV CODE 636 W HCPCS: Mod: PO | Performed by: NURSE ANESTHETIST, CERTIFIED REGISTERED

## 2022-06-21 PROCEDURE — 37000008 HC ANESTHESIA 1ST 15 MINUTES: Mod: PO | Performed by: INTERNAL MEDICINE

## 2022-06-21 PROCEDURE — 37000009 HC ANESTHESIA EA ADD 15 MINS: Mod: PO | Performed by: INTERNAL MEDICINE

## 2022-06-21 PROCEDURE — D9220A PRA ANESTHESIA: ICD-10-PCS | Mod: CRNA,,, | Performed by: NURSE ANESTHETIST, CERTIFIED REGISTERED

## 2022-06-21 PROCEDURE — D9220A PRA ANESTHESIA: Mod: CRNA,,, | Performed by: NURSE ANESTHETIST, CERTIFIED REGISTERED

## 2022-06-21 RX ORDER — SODIUM CHLORIDE, SODIUM LACTATE, POTASSIUM CHLORIDE, CALCIUM CHLORIDE 600; 310; 30; 20 MG/100ML; MG/100ML; MG/100ML; MG/100ML
INJECTION, SOLUTION INTRAVENOUS CONTINUOUS
Status: DISCONTINUED | OUTPATIENT
Start: 2022-06-21 | End: 2022-06-21 | Stop reason: HOSPADM

## 2022-06-21 RX ORDER — PROPOFOL 10 MG/ML
VIAL (ML) INTRAVENOUS
Status: DISCONTINUED | OUTPATIENT
Start: 2022-06-21 | End: 2022-06-21

## 2022-06-21 RX ORDER — SODIUM CHLORIDE 0.9 % (FLUSH) 0.9 %
10 SYRINGE (ML) INJECTION
Status: DISCONTINUED | OUTPATIENT
Start: 2022-06-21 | End: 2022-06-21 | Stop reason: HOSPADM

## 2022-06-21 RX ORDER — LIDOCAINE HCL/PF 100 MG/5ML
SYRINGE (ML) INTRAVENOUS
Status: DISCONTINUED | OUTPATIENT
Start: 2022-06-21 | End: 2022-06-21

## 2022-06-21 RX ADMIN — PROPOFOL 30 MG: 10 INJECTION, EMULSION INTRAVENOUS at 08:06

## 2022-06-21 RX ADMIN — SODIUM CHLORIDE, SODIUM LACTATE, POTASSIUM CHLORIDE, AND CALCIUM CHLORIDE: .6; .31; .03; .02 INJECTION, SOLUTION INTRAVENOUS at 08:06

## 2022-06-21 RX ADMIN — LIDOCAINE HYDROCHLORIDE 100 MG: 20 INJECTION, SOLUTION INTRAVENOUS at 08:06

## 2022-06-21 NOTE — DISCHARGE SUMMARY
[facility] - Endoscopy  Discharge Note  Short Stay    Discharge Note  Short Stay      SUMMARY     Admit Date: 6/21/2022    Attending Physician: Shane Parker MD     Discharge Physician: Shane Parker MD    Discharge Date: 6/21/2022 8:42 AM    Final Diagnosis: History of colon cancer [Z85.038]    Disposition: HOME OR SELF CARE    Patient Instructions:   Current Discharge Medication List      CONTINUE these medications which have NOT CHANGED    Details   buPROPion (WELLBUTRIN SR) 150 MG TBSR 12 hr tablet Take 1 tablet (150 mg total) by mouth 2 (two) times daily.  Qty: 180 tablet, Refills: 0    Comments: Please inactivate all prior scripts with same name and strength including on holds.  Associated Diagnoses: Depression, unspecified depression type      busPIRone (BUSPAR) 10 MG tablet Take 1 tablet (10 mg total) by mouth 2 (two) times daily.  Qty: 180 tablet, Refills: 0      duke's soln (benadryl 30 mL, mylanta 30 mL, LIDOcaine 30 mL, nystatin 30 mL) 120mL 5 ml swish and swallow every 4 hours as needed for mouth pain/ulcers  Qty: 120 mL, Refills: 0    Comments: Mix in equal parts.  Generic is fine.  Associated Diagnoses: Malignant neoplasm of sigmoid colon      granisetron (SANCUSO) 3.1 mg/24 hour Place 1 patch (3.1 mg total) onto the skin every 7 days AS DIRECTED.  Qty: 4 patch, Refills: 3    Associated Diagnoses: Nausea      Lactobacillus rhamnosus GG (CULTURELLE) 10 billion cell capsule Take 1 capsule by mouth once daily.      lactulose (CHRONULAC) 10 gram/15 mL solution Take 30 mLs (20 g total) by mouth daily as needed (constipation).  Qty: 500 mL, Refills: 2    Associated Diagnoses: Malignant neoplasm of sigmoid colon      levothyroxine (SYNTHROID) 200 MCG tablet Take 1 tablet (200 mcg total) by mouth once daily.  Qty: 90 tablet, Refills: 3      LIDOcaine-prilocaine (EMLA) cream Apply topically as needed (30 to 60 min prior chemo or port use).  Qty: 30 g, Refills: 3    Associated Diagnoses: Malignant  neoplasm of sigmoid colon      multivitamin (THERAGRAN) per tablet Take 1 tablet by mouth once daily.      ondansetron (ZOFRAN-ODT) 8 MG TbDL Take 1 tablet (8 mg total) by mouth every 8 (eight) hours as needed.  Qty: 60 tablet, Refills: 3    Comments: 1 tab q8h  Associated Diagnoses: Nausea      promethazine (PHENERGAN) 12.5 MG Tab (Take 1-2 tabs every 6 hours as needed for nausea persistent despite zofran)  Qty: 40 tablet, Refills: 3    Associated Diagnoses: Malignant neoplasm of sigmoid colon      acetaminophen (TYLENOL) 500 MG tablet Take 2 tablets (1,000 mg total) by mouth every 8 (eight) hours.  Qty: 60 tablet, Refills: 0      cyclobenzaprine (FLEXERIL) 10 MG tablet Take 1 tablet (10 mg total) by mouth nightly.  Qty: 30 tablet, Refills: 0    Comments: Please consider 90 day supplies to promote better adherence  Associated Diagnoses: Malignant neoplasm of sigmoid colon; Night muscle spasms      LORazepam (ATIVAN) 1 MG tablet Take 1 tablet (1 mg total) by mouth every 12 (twelve) hours as needed for Anxiety (nausea).  Qty: 30 tablet, Refills: 0    Associated Diagnoses: Nausea      tamsulosin (FLOMAX) 0.4 mg Cap Take 1 capsule (0.4 mg total) by mouth once daily. for 14 days  Qty: 14 capsule, Refills: 0      traMADoL (ULTRAM) 50 mg tablet Take 1 tablet (50 mg total) by mouth 3 (three) times daily as needed for Pain.  Qty: 90 tablet, Refills: 0    Comments: Quantity prescribed more than 7 day supply? Yes, quantity medically necessary  Associated Diagnoses: Chronic abdominal pain      traZODone (DESYREL) 50 MG tablet Take 1 tablet (50 mg total) by mouth nightly.  Qty: 30 tablet, Refills: 2    Associated Diagnoses: Insomnia, unspecified type             Discharge Procedure Orders (must include Diet, Follow-up, Activity)    Follow Up:  Follow up with PCP as previously scheduled  Resume routine diet.  Activity as tolerated.    No driving day of procedure.

## 2022-06-21 NOTE — ANESTHESIA PREPROCEDURE EVALUATION
06/21/2022  Gail Mckinnon is a 52 y.o., female.      Pre-op Assessment    I have reviewed the Patient Summary Reports.     I have reviewed the Nursing Notes. I have reviewed the NPO Status.   I have reviewed the Medications.     Review of Systems  Anesthesia Hx:  No problems with previous Anesthesia    Social:  Non-Smoker    Hematology/Oncology:         -- Cancer in past history (colon CA): chemotherapy and surgery    Cardiovascular:   Hypertension, well controlled Dysrhythmias (palpitations)    Pulmonary:  Pulmonary Normal    Renal/:   Chronic Renal Disease renal calculi    Hepatic/GI:   Hiatal Hernia,    Neurological:  Neurology Normal    Endocrine:   Hypothyroidism Hyperthyroidism  Obesity / BMI > 30, Morbid Obesity / BMI > 40  Psych:   Psychiatric History anxiety depression          Physical Exam  General: Well nourished, Cooperative, Alert and Oriented    Airway:  Mallampati: I   Mouth Opening: Normal  Neck ROM: Normal ROM        Anesthesia Plan  Type of Anesthesia, risks & benefits discussed:    Anesthesia Type: Gen ETT, Gen Supraglottic Airway, Gen Natural Airway, MAC  Intra-op Monitoring Plan: Standard ASA Monitors  Post Op Pain Control Plan: multimodal analgesia  Induction:  IV  Airway Plan: Direct, Video and Fiberoptic, Post-Induction  Informed Consent: Informed consent signed with the Patient and all parties understand the risks and agree with anesthesia plan.  All questions answered.   ASA Score: 3    Ready For Surgery From Anesthesia Perspective.     .

## 2022-06-21 NOTE — ANESTHESIA POSTPROCEDURE EVALUATION
Anesthesia Post Evaluation    Patient: Gail Mckinnon    Procedure(s) Performed: Procedure(s) (LRB):  COLONOSCOPY (N/A)    Final Anesthesia Type: general      Patient location during evaluation: PACU  Patient participation: Yes- Able to Participate  Level of consciousness: awake and alert and oriented  Post-procedure vital signs: reviewed and stable  Pain management: adequate  Airway patency: patent    PONV status at discharge: No PONV  Anesthetic complications: no      Cardiovascular status: blood pressure returned to baseline and stable  Respiratory status: unassisted and spontaneous ventilation  Hydration status: euvolemic  Follow-up not needed.          Vitals Value Taken Time   /74 06/21/22 0905   Temp 36.4 °C (97.5 °F) 06/21/22 0841   Pulse 74 06/21/22 0905   Resp 18 06/21/22 0905   SpO2 98 % 06/21/22 0905         Event Time   Out of Recovery 09:12:35         Pain/Aggie Score: Aggie Score: 10 (6/21/2022  9:06 AM)

## 2022-06-21 NOTE — TELEPHONE ENCOUNTER
Requested pt return call. Left voice mail.     Radha Giordano, JENNIEW     Trilobed Flap Text: The defect edges were debeveled with a #15 scalpel blade.  Given the location of the defect and the proximity to free margins a trilobed flap was deemed most appropriate.  Using a sterile surgical marker, an appropriate trilobed flap drawn around the defect.    The area thus outlined was incised deep to adipose tissue with a #15 scalpel blade.  The skin margins were undermined to an appropriate distance in all directions utilizing iris scissors.

## 2022-06-21 NOTE — PROVATION PATIENT INSTRUCTIONS
Discharge Summary/Instructions after an Endoscopic Procedure  Patient Name: Gail Mckinnon  Patient MRN: [id number]  Patient YOB: 1970 Tuesday, June 21, 2022  Shane Parker MD  Dear patient,  As a result of recent federal legislation (The Federal Cures Act), you may   receive lab or pathology results from your procedure in your [facility]   account before your physician is able to contact you. Your physician or   their representative will relay the results to you with their   recommendations at their soonest availability.  Thank you,  RESTRICTIONS:  During your procedure today, you received medications for sedation.  These   medications may affect your judgment, balance and coordination.  Therefore,   for 24 hours, you have the following restrictions:   - DO NOT drive a car, operate machinery, make legal/financial decisions,   sign important papers or drink alcohol.    ACTIVITY:  Today: no heavy lifting, straining or running due to procedural   sedation/anesthesia.  The following day: return to full activity including work.  DIET:  Eat and drink normally unless instructed otherwise.     TREATMENT FOR COMMON SIDE EFFECTS:  - Mild abdominal pain, nausea, belching, bloating or excessive gas:  rest,   eat lightly and use a heating pad.  - Sore Throat: treat with throat lozenges and/or gargle with warm salt   water.  - Because air was used during the procedure, expelling large amounts of air   from your rectum or belching is normal.  - If a bowel prep was taken, you may not have a bowel movement for 1-3 days.    This is normal.  SYMPTOMS TO WATCH FOR AND REPORT TO YOUR PHYSICIAN:  1. Abdominal pain or bloating, other than gas cramps.  2. Chest pain.  3. Back pain.  4. Signs of infection such as: chills or fever occurring within 24 hours   after the procedure.  5. Rectal bleeding, which would show as bright red, maroon, or black stools.   (A tablespoon of blood from the rectum is not serious, especially if    hemorrhoids are present.)  6. Vomiting.  7. Weakness or dizziness.  GO DIRECTLY TO THE NEAREST EMERGENCY ROOM IF YOU HAVE ANY OF THE FOLLOWING:      Difficulty breathing              Chills and/or fever over 101 F   Persistent vomiting and/or vomiting blood   Severe abdominal pain   Severe chest pain   Black, tarry stools   Bleeding- more than one tablespoon   Any other symptom or condition that you feel may need urgent attention  Your doctor recommends these additional instructions:  If any biopsies were taken, your doctors clinic will contact you in 1 to 2   weeks with any results.  Your physician has recommended a repeat colonoscopy in three years for   surveillance.   You are being discharged to home.  For questions, problems or results please call your physician - Shane Parker MD at Work:  [phone].  EMERGENCY PHONE NUMBER: [phone], LAB RESULTS: [phone]  IF A COMPLICATION OR EMERGENCY SITUATION ARISES AND YOU ARE UNABLE TO REACH   YOUR PHYSICIAN - GO DIRECTLY TO THE EMERGENCY ROOM.  ___________________________________________  Nurse Signature  ___________________________________________  Patient/Designated Responsible Party Signature  Shane Parker MD  6/21/2022 8:41:50 AM  This report has been verified and signed electronically.  Dear patient,  As a result of recent federal legislation (The Federal Cures Act), you may   receive lab or pathology results from your procedure in your [facility]   account before your physician is able to contact you. Your physician or   their representative will relay the results to you with their   recommendations at their soonest availability.  Thank you.  PROVATION

## 2022-06-21 NOTE — TRANSFER OF CARE
"Anesthesia Transfer of Care Note    Patient: Gail Mckinnon    Procedure(s) Performed: Procedure(s) (LRB):  COLONOSCOPY (N/A)    Patient location: PACU    Anesthesia Type: general    Transport from OR: Transported from OR on room air with adequate spontaneous ventilation    Post pain: adequate analgesia    Post assessment: no apparent anesthetic complications and tolerated procedure well    Post vital signs: stable    Level of consciousness: awake and alert    Nausea/Vomiting: no nausea/vomiting    Complications: none    Transfer of care protocol was followed      Last vitals:   Visit Vitals  /72 (BP Location: Left arm, Patient Position: Lying)   Pulse 74   Temp 36.4 °C (97.5 °F) (Skin)   Resp 18   Ht 5' 6" (1.676 m)   Wt 131.1 kg (289 lb)   LMP 02/17/2020   SpO2 98%   Breastfeeding No   BMI 46.65 kg/m²     "

## 2022-06-21 NOTE — H&P
History & Physical - Short Stay  Gastroenterology      SUBJECTIVE:     Procedure: Colonoscopy    Chief Complaint/Indication for Procedure: History of Colon Cancer    PTA Medications   Medication Sig    buPROPion (WELLBUTRIN SR) 150 MG TBSR 12 hr tablet Take 1 tablet (150 mg total) by mouth 2 (two) times daily. (Patient taking differently: Take 150 mg by mouth once daily.)    busPIRone (BUSPAR) 10 MG tablet Take 1 tablet (10 mg total) by mouth 2 (two) times daily.    duke's soln (benadryl 30 mL, mylanta 30 mL, LIDOcaine 30 mL, nystatin 30 mL) 120mL 5 ml swish and swallow every 4 hours as needed for mouth pain/ulcers    granisetron (SANCUSO) 3.1 mg/24 hour Place 1 patch (3.1 mg total) onto the skin every 7 days AS DIRECTED.    Lactobacillus rhamnosus GG (CULTURELLE) 10 billion cell capsule Take 1 capsule by mouth once daily.    lactulose (CHRONULAC) 10 gram/15 mL solution Take 30 mLs (20 g total) by mouth daily as needed (constipation).    levothyroxine (SYNTHROID) 200 MCG tablet Take 1 tablet (200 mcg total) by mouth once daily.    LIDOcaine-prilocaine (EMLA) cream Apply topically as needed (30 to 60 min prior chemo or port use).    multivitamin (THERAGRAN) per tablet Take 1 tablet by mouth once daily.    ondansetron (ZOFRAN-ODT) 8 MG TbDL Take 1 tablet (8 mg total) by mouth every 8 (eight) hours as needed.    promethazine (PHENERGAN) 12.5 MG Tab (Take 1-2 tabs every 6 hours as needed for nausea persistent despite zofran)    acetaminophen (TYLENOL) 500 MG tablet Take 2 tablets (1,000 mg total) by mouth every 8 (eight) hours. (Patient not taking: Reported on 6/16/2022)    cyclobenzaprine (FLEXERIL) 10 MG tablet Take 1 tablet (10 mg total) by mouth nightly.    LORazepam (ATIVAN) 1 MG tablet Take 1 tablet (1 mg total) by mouth every 12 (twelve) hours as needed for Anxiety (nausea). (Patient not taking: Reported on 6/16/2022)    tamsulosin (FLOMAX) 0.4 mg Cap Take 1 capsule (0.4 mg total) by mouth once  daily. for 14 days (Patient not taking: Reported on 2022)    traMADoL (ULTRAM) 50 mg tablet Take 1 tablet (50 mg total) by mouth 3 (three) times daily as needed for Pain.    traZODone (DESYREL) 50 MG tablet Take 1 tablet (50 mg total) by mouth nightly.       Review of patient's allergies indicates:   Allergen Reactions    Oxaliplatin Other (See Comments)     Itchy hands, throat closed up, trouble hearing - during infusion    Penicillins Hives and Rash     childhood allergy  childhood allergy  unknown        Past Medical History:   Diagnosis Date    Anxiety     Depression     FH: ovarian cancer 3/16/2020    Hx of psychiatric care     Effexor, Paxil, Lexapro, Zoloft, Wellbutrin, Trazodone Buspar    Hyperthyroidism     Hypothyroid     Kidney calculi     Malignant neoplasm of sigmoid colon 3/16/2020    Menorrhagia     Multinodular goiter 2012    Palpitation     Psychiatric problem     Venous insufficiency      Past Surgical History:   Procedure Laterality Date     SECTION, CLASSIC      x3    COLONOSCOPY N/A 2020    Procedure: COLONOSCOPY;  Surgeon: Shane Parker MD;  Location: [facility];  Service: Endoscopy;  Laterality: N/A;    CYSTOSCOPY W/ URETERAL STENT PLACEMENT Bilateral 3/25/2020    Procedure: CYSTOSCOPY, WITH URETERAL STENT INSERTION;  Surgeon: Claudio Tyson MD;  Location[facility] OR [address] [place] [address];  Service: Urology;  Laterality: Bilateral;    INSERTION OF TUNNELED CENTRAL VENOUS CATHETER (CVC) WITH SUBCUTANEOUS PORT N/A 2020    Procedure: [id number];  Surgeon: [facility];  Location: [facility] OR [address] [place] [address];  Service: Radiology;  Laterality: N/A;  Room [facility]    LAPAROSCOPIC SIGMOIDECTOMY N/A 3/25/2020    Procedure: COLECTOMY, SIGMOID, LAPAROSCOPIC, flex sig, ERAS high;  Surgeon: Silvio Man MD;  Location: [facility] OR [address] [place] [address];  Service: Colon and Rectal;  Laterality: N/A;    MOBILIZATION OF SPLENIC FLEXURE  3/25/2020    Procedure:  MOBILIZATION, SPLENIC FLEXURE;  Surgeon: Silvio Man MD;  [facility];  Service: Colon and Rectal;;    tonsillectomy      TOTAL ABDOMINAL HYSTERECTOMY W/ BILATERAL SALPINGOOPHORECTOMY N/A 3/25/2020    Procedure: HYSTERECTOMY, TOTAL, ABDOMINAL, WITH BILATERAL SALPINGO-OOPHORECTOMY;  Surgeon: Keron Brady MD;  [facility];  Service: Oncology;  Laterality: N/A;     Family History   Problem Relation Age of Onset    Heart disease Father     Diabetes Mother 65    Drug abuse Brother     Drug abuse Brother     Cancer Maternal Aunt         lung cancer    Cancer Maternal Grandmother         stomach cancer- started in ovaries    Ovarian cancer Maternal Grandmother         stomach cancer- started in ovaries    Colon cancer Paternal Uncle     Cancer Paternal Uncle     Ovarian cancer Maternal Aunt     Ovarian cancer Maternal Aunt     Ovarian cancer Cousin         mother had ovarian cancer    Drug abuse Son     Drug abuse Son         clean/sober since 2012    Colon cancer Son     No Known Problems Daughter     Uterine cancer Neg Hx     Breast cancer Neg Hx      Social History     Tobacco Use    Smoking status: Never Smoker    Smokeless tobacco: Never Used   Substance Use Topics    Alcohol use: Yes     Comment: occasionally- twice monthly    Drug use: Never         OBJECTIVE:     Vital Signs (Most Recent)  Temp: 97.1 °F (36.2 °C) (06/21/22 0755)  Pulse: 72 (06/21/22 0755)  Resp: 18 (06/21/22 0755)  BP: 122/60 (06/21/22 0755)  SpO2: 95 % (06/21/22 0755)    Physical Exam:                                                       GENERAL:  Comfortable, in no acute distress.                                 HEENT EXAM:  Nonicteric.  No adenopathy.  Oropharynx is clear.               NECK:  Supple.                                                               LUNGS:  Clear.                                                               CARDIAC:  Regular rate and rhythm.  S1, S2.   No murmur.                      ABDOMEN:  Soft, positive bowel sounds, nontender.  No hepatosplenomegaly or masses.  No rebound or guarding.                                             EXTREMITIES:  No edema.     MENTAL STATUS:  Normal, alert and oriented.      ASSESSMENT/PLAN:     Assessment: History of Colon Cancer    Plan: Colonoscopy    Anesthesia Plan: General    ASA Grade: ASA 2 - Patient with mild systemic disease with no functional limitations    MALLAMPATI SCORE:  I (soft palate, uvula, fauces, and tonsillar pillars visible)

## 2022-06-22 VITALS
WEIGHT: 289 LBS | HEIGHT: 66 IN | RESPIRATION RATE: 18 BRPM | TEMPERATURE: 98 F | OXYGEN SATURATION: 98 % | SYSTOLIC BLOOD PRESSURE: 132 MMHG | DIASTOLIC BLOOD PRESSURE: 74 MMHG | BODY MASS INDEX: 46.45 KG/M2 | HEART RATE: 74 BPM

## 2022-07-26 NOTE — LETTER
April 3, 2020      Silvio Man MD  [address] [facility] [address]           [place] [facility]  [address]  [place] [address]  Phone: [phone]          Patient: Gail Mckinnon   MR Number: [id number]   YOB: 1970   Date of Visit: 4/3/2020       Dear Dr. Silvio Man:    Thank you for referring Gail Mckinnon to me for evaluation. Attached you will find relevant portions of my assessment and plan of care.    If you have questions, please do not hesitate to call me. I look forward to following Gail Mckinnon along with you.    Sincerely,    MAXWELL Dove MD    Enclosure  CC:  No Recipients    If you would like to receive this communication electronically, please contact [email or web address] or [phone] to request more information on [employer] access.    For providers and/or their staff who would like to refer a patient to [facility], please contact us through our one-stop-shop provider referral line, [facility], at [phone].    If you feel you have received this communication in error or would no longer like to receive these types of communications, please e-mail [email or web address]         
Yes

## 2022-08-15 ENCOUNTER — OFFICE VISIT (OUTPATIENT)
Dept: HEMATOLOGY/ONCOLOGY | Facility: CLINIC | Age: 52
End: 2022-08-15
Payer: COMMERCIAL

## 2022-08-15 ENCOUNTER — INFUSION (OUTPATIENT)
Dept: INFUSION THERAPY | Facility: HOSPITAL | Age: 52
End: 2022-08-15
Attending: INTERNAL MEDICINE
Payer: COMMERCIAL

## 2022-08-15 ENCOUNTER — LAB VISIT (OUTPATIENT)
Dept: LAB | Facility: HOSPITAL | Age: 52
End: 2022-08-15
Attending: INTERNAL MEDICINE
Payer: COMMERCIAL

## 2022-08-15 VITALS
SYSTOLIC BLOOD PRESSURE: 152 MMHG | WEIGHT: 289.44 LBS | HEART RATE: 85 BPM | OXYGEN SATURATION: 96 % | RESPIRATION RATE: 18 BRPM | TEMPERATURE: 98 F | HEIGHT: 66 IN | DIASTOLIC BLOOD PRESSURE: 92 MMHG | BODY MASS INDEX: 46.52 KG/M2

## 2022-08-15 DIAGNOSIS — C18.7 MALIGNANT NEOPLASM OF SIGMOID COLON: Primary | ICD-10-CM

## 2022-08-15 DIAGNOSIS — C18.7 MALIGNANT NEOPLASM OF SIGMOID COLON: ICD-10-CM

## 2022-08-15 DIAGNOSIS — R74.01 TRANSAMINITIS: ICD-10-CM

## 2022-08-15 DIAGNOSIS — C77.2 METASTASIS TO INTESTINAL LYMPH NODE: ICD-10-CM

## 2022-08-15 DIAGNOSIS — C77.9 SECONDARY ADENOCARCINOMA OF LYMPH NODE: ICD-10-CM

## 2022-08-15 LAB
ALBUMIN SERPL BCP-MCNC: 4.2 G/DL (ref 3.5–5.2)
ALP SERPL-CCNC: 155 U/L (ref 55–135)
ALT SERPL W/O P-5'-P-CCNC: 134 U/L (ref 10–44)
ANION GAP SERPL CALC-SCNC: 10 MMOL/L (ref 8–16)
AST SERPL-CCNC: 95 U/L (ref 10–40)
BASOPHILS # BLD AUTO: 0.05 K/UL (ref 0–0.2)
BASOPHILS NFR BLD: 0.9 % (ref 0–1.9)
BILIRUB SERPL-MCNC: 0.6 MG/DL (ref 0.1–1)
BUN SERPL-MCNC: 11 MG/DL (ref 6–20)
CALCIUM SERPL-MCNC: 11.1 MG/DL (ref 8.7–10.5)
CHLORIDE SERPL-SCNC: 108 MMOL/L (ref 95–110)
CO2 SERPL-SCNC: 23 MMOL/L (ref 23–29)
CREAT SERPL-MCNC: 1 MG/DL (ref 0.5–1.4)
DIFFERENTIAL METHOD: ABNORMAL
EOSINOPHIL # BLD AUTO: 0.5 K/UL (ref 0–0.5)
EOSINOPHIL NFR BLD: 8.5 % (ref 0–8)
ERYTHROCYTE [DISTWIDTH] IN BLOOD BY AUTOMATED COUNT: 14.6 % (ref 11.5–14.5)
EST. GFR  (NO RACE VARIABLE): >60 ML/MIN/1.73 M^2
GLUCOSE SERPL-MCNC: 104 MG/DL (ref 70–110)
HCT VFR BLD AUTO: 46.2 % (ref 37–48.5)
HGB BLD-MCNC: 14.6 G/DL (ref 12–16)
IMM GRANULOCYTES # BLD AUTO: 0.01 K/UL (ref 0–0.04)
IMM GRANULOCYTES NFR BLD AUTO: 0.2 % (ref 0–0.5)
LYMPHOCYTES # BLD AUTO: 1.9 K/UL (ref 1–4.8)
LYMPHOCYTES NFR BLD: 32.7 % (ref 18–48)
MCH RBC QN AUTO: 29.4 PG (ref 27–31)
MCHC RBC AUTO-ENTMCNC: 31.6 G/DL (ref 32–36)
MCV RBC AUTO: 93 FL (ref 82–98)
MONOCYTES # BLD AUTO: 0.5 K/UL (ref 0.3–1)
MONOCYTES NFR BLD: 8.3 % (ref 4–15)
NEUTROPHILS # BLD AUTO: 2.8 K/UL (ref 1.8–7.7)
NEUTROPHILS NFR BLD: 49.4 % (ref 38–73)
NRBC BLD-RTO: 0 /100 WBC
PLATELET # BLD AUTO: 258 K/UL (ref 150–450)
PMV BLD AUTO: 10.6 FL (ref 9.2–12.9)
POTASSIUM SERPL-SCNC: 4.1 MMOL/L (ref 3.5–5.1)
PROT SERPL-MCNC: 7.6 G/DL (ref 6–8.4)
RBC # BLD AUTO: 4.96 M/UL (ref 4–5.4)
SODIUM SERPL-SCNC: 141 MMOL/L (ref 136–145)
WBC # BLD AUTO: 5.65 K/UL (ref 3.9–12.7)

## 2022-08-15 PROCEDURE — 3008F BODY MASS INDEX DOCD: CPT | Mod: CPTII,S$GLB,, | Performed by: INTERNAL MEDICINE

## 2022-08-15 PROCEDURE — 3077F SYST BP >= 140 MM HG: CPT | Mod: CPTII,S$GLB,, | Performed by: INTERNAL MEDICINE

## 2022-08-15 PROCEDURE — 25000003 PHARM REV CODE 250: Mod: PN | Performed by: INTERNAL MEDICINE

## 2022-08-15 PROCEDURE — 80053 COMPREHEN METABOLIC PANEL: CPT | Mod: PN | Performed by: INTERNAL MEDICINE

## 2022-08-15 PROCEDURE — 63600175 PHARM REV CODE 636 W HCPCS: Mod: PN | Performed by: INTERNAL MEDICINE

## 2022-08-15 PROCEDURE — 96523 IRRIG DRUG DELIVERY DEVICE: CPT | Mod: PN

## 2022-08-15 PROCEDURE — 3077F PR MOST RECENT SYSTOLIC BLOOD PRESSURE >= 140 MM HG: ICD-10-PCS | Mod: CPTII,S$GLB,, | Performed by: INTERNAL MEDICINE

## 2022-08-15 PROCEDURE — A4216 STERILE WATER/SALINE, 10 ML: HCPCS | Mod: PN | Performed by: INTERNAL MEDICINE

## 2022-08-15 PROCEDURE — 99999 PR PBB SHADOW E&M-EST. PATIENT-LVL IV: CPT | Mod: PBBFAC,,, | Performed by: INTERNAL MEDICINE

## 2022-08-15 PROCEDURE — 99215 PR OFFICE/OUTPT VISIT, EST, LEVL V, 40-54 MIN: ICD-10-PCS | Mod: S$GLB,,, | Performed by: INTERNAL MEDICINE

## 2022-08-15 PROCEDURE — 3080F DIAST BP >= 90 MM HG: CPT | Mod: CPTII,S$GLB,, | Performed by: INTERNAL MEDICINE

## 2022-08-15 PROCEDURE — 99999 PR PBB SHADOW E&M-EST. PATIENT-LVL IV: ICD-10-PCS | Mod: PBBFAC,,, | Performed by: INTERNAL MEDICINE

## 2022-08-15 PROCEDURE — 3080F PR MOST RECENT DIASTOLIC BLOOD PRESSURE >= 90 MM HG: ICD-10-PCS | Mod: CPTII,S$GLB,, | Performed by: INTERNAL MEDICINE

## 2022-08-15 PROCEDURE — 99215 OFFICE O/P EST HI 40 MIN: CPT | Mod: S$GLB,,, | Performed by: INTERNAL MEDICINE

## 2022-08-15 PROCEDURE — 36415 COLL VENOUS BLD VENIPUNCTURE: CPT | Mod: PN | Performed by: INTERNAL MEDICINE

## 2022-08-15 PROCEDURE — 3008F PR BODY MASS INDEX (BMI) DOCUMENTED: ICD-10-PCS | Mod: CPTII,S$GLB,, | Performed by: INTERNAL MEDICINE

## 2022-08-15 PROCEDURE — 85025 COMPLETE CBC W/AUTO DIFF WBC: CPT | Mod: PN | Performed by: INTERNAL MEDICINE

## 2022-08-15 RX ORDER — ENOXAPARIN SODIUM 100 MG/ML
INJECTION SUBCUTANEOUS
COMMUNITY
Start: 2022-07-16 | End: 2022-08-15

## 2022-08-15 RX ORDER — SODIUM CHLORIDE 0.9 % (FLUSH) 0.9 %
10 SYRINGE (ML) INJECTION
Status: DISCONTINUED | OUTPATIENT
Start: 2022-08-15 | End: 2022-08-15 | Stop reason: HOSPADM

## 2022-08-15 RX ORDER — HEPARIN 100 UNIT/ML
500 SYRINGE INTRAVENOUS
Status: DISCONTINUED | OUTPATIENT
Start: 2022-08-15 | End: 2022-08-15 | Stop reason: HOSPADM

## 2022-08-15 RX ORDER — HEPARIN 100 UNIT/ML
500 SYRINGE INTRAVENOUS
Status: CANCELLED | OUTPATIENT
Start: 2022-08-15

## 2022-08-15 RX ORDER — HYDROCODONE BITARTRATE AND ACETAMINOPHEN 7.5; 325 MG/1; MG/1
1 TABLET ORAL
COMMUNITY
Start: 2022-07-18 | End: 2022-08-15

## 2022-08-15 RX ORDER — HYDROCODONE BITARTRATE AND ACETAMINOPHEN 7.5; 325 MG/1; MG/1
1 TABLET ORAL EVERY 4 HOURS PRN
COMMUNITY
Start: 2022-07-19 | End: 2022-08-15

## 2022-08-15 RX ORDER — ENOXAPARIN SODIUM 100 MG/ML
40 INJECTION SUBCUTANEOUS
COMMUNITY
Start: 2022-07-16 | End: 2022-08-15

## 2022-08-15 RX ORDER — METHOCARBAMOL 750 MG/1
TABLET, FILM COATED ORAL
COMMUNITY
Start: 2022-07-16 | End: 2022-08-24

## 2022-08-15 RX ORDER — SULFAMETHOXAZOLE AND TRIMETHOPRIM 800; 160 MG/1; MG/1
1 TABLET ORAL 2 TIMES DAILY
COMMUNITY
Start: 2022-07-29 | End: 2022-10-31 | Stop reason: ALTCHOICE

## 2022-08-15 RX ORDER — METHOCARBAMOL 750 MG/1
750 TABLET, FILM COATED ORAL
COMMUNITY
Start: 2022-07-16 | End: 2022-08-24

## 2022-08-15 RX ORDER — AMOXICILLIN 250 MG
2 CAPSULE ORAL 2 TIMES DAILY
COMMUNITY
Start: 2022-07-16 | End: 2024-01-15 | Stop reason: CLARIF

## 2022-08-15 RX ORDER — SODIUM CHLORIDE 0.9 % (FLUSH) 0.9 %
10 SYRINGE (ML) INJECTION
Status: CANCELLED | OUTPATIENT
Start: 2022-08-15

## 2022-08-15 RX ADMIN — Medication 10 ML: at 03:08

## 2022-08-15 RX ADMIN — Medication 500 UNITS: at 03:08

## 2022-08-15 NOTE — PROGRESS NOTES
PROGRESS NOTE    Subjective:       Patient ID: Gail Mckinnon is a 52 y.o. female.  MRN: [id number]  : 1970    Chief Complaint: Malignant neoplasm of sigmoid colon      History of Present Illness:   Gail Mckinnon is a 52 y.o. female who presents with colon cancer, initially stage III and now with LN recurrence.       She completed adjuvant FOLFOX in 2020. She tolerated only 4 cycles of FOLFOX before she developed an infusion reaction to oxaliplatin in cycle 5 was well as cycle 6. She then completed 6 cycles of infusional 5 FU.     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 She is now on second line therapy with FOLFIRI and Avastin.      She presented to the ED on  with left flank pain. CT renal stone study showed Moderate hydronephrosis on the left secondary to 3 mm calculus at the UPJ.    She had a PET scan mid April that showed possible progression of her disease with enlarging lymphadenopahty from 1.7cm to 1.9 cm, no new lesion.     For now, we have continued FOLFIRI+ Avastin with plan to repeat short term imaging. She has been to [facility] for another opinion. No change was recommended in systemic therapy but she was called back to discuss surgical options.      She saw surgical oncology on  and they recommend surgical removal of the aorta caval nodes.  Recent sans at [facility]  show stable disease.    Surgery due 22. Received 2 more cycle of FOLFIRI without Avastin.        Interim history:  She presents to discuss symptoms, labs,and further recommendations.     under went open Left periaortic and aortocaval lymph node dissection on 2022.   As per surgical notes, her pathology report demonstrated at least 3 out of 10 lymph nodes with additional tumor deposits in the surrounding fatty tissue. Outside path is not available at this time.     Stayed in the hospital for 5 days, had drains for 5  days, needed Bactrim for mild local infection, has completed it.   Took Lovenox for about 28 days for DVT prophylaxis.   Follow up visit is at the end of September.     Oncology History:  Oncology History   Malignant neoplasm of sigmoid colon   3/16/2020 Initial Diagnosis    Malignant neoplasm of sigmoid colon     3/31/2020 Cancer Staged    Staging form: Colon and Rectum, AJCC 8th Edition  - Clinical stage from 3/31/2020: Stage IIIC (cT4b, cN2a, cM0)     5/6/2020 - 11/17/2020 Chemotherapy    Treatment Summary   Plan Name: OP FOLFOX 6 Q2W  Treatment Goal: Curative  Status: Inactive  Start Date: 5/6/2020  End Date: 11/6/2020  Provider: Dylan Leyva MD  Chemotherapy: fluorouraciL injection 945 mg, 400 mg/m2 = 945 mg, Intravenous, Clinic/[facility] 1 time, 14 of 14 cycles  Administration: 945 mg (5/6/2020), 945 mg (5/20/2020), 945 mg (6/3/2020), 945 mg (6/17/2020), 945 mg (7/29/2020), 945 mg (8/12/2020), 945 mg (8/26/2020), 945 mg (9/9/2020), 945 mg (9/23/2020), 945 mg (10/6/2020), 945 mg (10/21/2020), 945 mg (11/4/2020)  fluorouraciL 2,400 mg/m2 = 5,665 mg in sodium chloride 0.9% 240 mL chemo infusion, 2,400 mg/m2 = 5,665 mg, Intravenous, Over 46 hours, 14 of 14 cycles  Administration: 5,665 mg (5/6/2020), 5,665 mg (5/20/2020), 5,665 mg (6/3/2020), 5,665 mg (6/17/2020), 5,665 mg (7/29/2020), 5,665 mg (8/12/2020), 5,665 mg (8/26/2020), 5,665 mg (9/9/2020), 5,665 mg (9/23/2020), 5,665 mg (10/6/2020), 5,665 mg (10/21/2020), 5,665 mg (11/4/2020)  leucovorin calcium 900 mg in dextrose 5 % 250 mL infusion, 945 mg, Intravenous, Clinic[facility] 1 time, 14 of 14 cycles  Administration: 900 mg (5/6/2020), 900 mg (5/20/2020), 900 mg (6/3/2020), 900 mg (6/17/2020), 945 mg (6/30/2020), 945 mg (7/20/2020), 945 mg (7/29/2020), 945 mg (8/12/2020), 945 mg (8/26/2020), 945 mg (9/9/2020), 945 mg (9/23/2020), 945 mg (10/6/2020), 945 mg (10/21/2020), 945 mg (11/4/2020)  oxaliplatin (ELOXATIN) 200 mg in dextrose 5 % 500 mL chemo infusion, 201 mg,  Intravenous, Clinic/[facility] 1 time, 6 of 6 cycles  Dose modification: 65 mg/m2 (original dose 85 mg/m2, Cycle 6)  Administration: 200 mg (5/6/2020), 200 mg (5/20/2020), 200 mg (6/3/2020), 200 mg (6/17/2020), 201 mg (6/30/2020), 150 mg (7/20/2020)     6/16/2021 -  Chemotherapy    Treatment Summary   Plan Name: OP COLORECTAL FOLFIRI + BEVACIZUMAB Q2W  Treatment Goal: Control  Status: Active  Start Date: 6/16/2021  End Date: 6/17/2022  Provider: Marah Santo MD  Chemotherapy: fluorouraciL injection 990 mg, 400 mg/m2 = 990 mg, Intravenous, Clinic/[facility] 1 time, 15 of 15 cycles  Administration: 990 mg (6/16/2021), 990 mg (6/30/2021), 990 mg (7/14/2021), 980 mg (7/28/2021), 990 mg (8/11/2021), 990 mg (8/25/2021), 990 mg (9/8/2021), 990 mg (9/22/2021), 990 mg (10/6/2021), 990 mg (10/20/2021), 990 mg (11/3/2021), 990 mg (12/1/2021), 990 mg (12/15/2021), 990 mg (11/17/2021), 990 mg (12/28/2021)  fluorouraciL 2,400 mg/m2 = 5,950 mg in sodium chloride 0.9% 240 mL chemo infusion, 2,400 mg/m2 = 5,950 mg, Intravenous, Over 46 hours, 25 of 25 cycles  Administration: 5,950 mg (6/16/2021), 5,950 mg (6/30/2021), 5,950 mg (7/14/2021), 5,880 mg (7/28/2021), 5,930 mg (8/11/2021), 5,930 mg (8/25/2021), 5,930 mg (9/8/2021), 5,930 mg (9/22/2021), 5,930 mg (10/6/2021), 5,930 mg (10/20/2021), 5,930 mg (11/3/2021), 5,930 mg (12/1/2021), 5,930 mg (12/15/2021), 5,930 mg (11/17/2021), 5,930 mg (12/28/2021), 6,050 mg (5/11/2022), 6,025 mg (3/9/2022), 6,025 mg (2/23/2022), 5,930 mg (2/2/2022), 5,930 mg (1/19/2022), 6,050 mg (4/20/2022), 6,025 mg (4/6/2022), 6,025 mg (3/23/2022), 6,050 mg (6/1/2022), 6,050 mg (6/15/2022)  bevacizumab (AVASTIN) 600 mg in sodium chloride 0.9% 100 mL chemo infusion, 660 mg, Intravenous, Clinic/[facility] 1 time, 23 of 23 cycles  Administration: 600 mg (6/30/2021), 600 mg (7/14/2021), 600 mg (7/28/2021), 600 mg (6/16/2021), 600 mg (8/11/2021), 655 mg (8/25/2021), 600 mg (9/8/2021), 600 mg (9/22/2021), 600 mg (10/6/2021), 600 mg  (10/20/2021), 600 mg (11/3/2021), 655 mg (12/1/2021), 600 mg (12/15/2021), 600 mg (11/17/2021), 600 mg (12/28/2021), 600 mg (5/11/2022), 600 mg (3/9/2022), 600 mg (2/23/2022), 600 mg (2/2/2022), 600 mg (1/19/2022), 600 mg (4/20/2022), 680 mg (4/6/2022), 600 mg (3/23/2022)  irinotecan (CAMPTOSAR) 440 mg in sodium chloride 0.9% 500 mL chemo infusion, 446 mg, Intravenous, [facility] 1 time, 25 of 25 cycles  Dose modification: 180 mg/m2 (original dose 180 mg/m2, Cycle 24)  Administration: 440 mg (6/16/2021), 440 mg (6/30/2021), 440 mg (7/14/2021), 440 mg (7/28/2021), 440 mg (8/11/2021), 444 mg (8/25/2021), 440 mg (9/8/2021), 440 mg (9/22/2021), 440 mg (10/6/2021), 440 mg (10/20/2021), 440 mg (11/3/2021), 440 mg (12/1/2021), 440 mg (12/15/2021), 440 mg (11/17/2021), 440 mg (12/28/2021), 440 mg (5/11/2022), 440 mg (3/9/2022), 440 mg (2/23/2022), 440 mg (2/2/2022), 440 mg (1/19/2022), 440 mg (4/20/2022), 440 mg (4/6/2022), 440 mg (3/24/2022), 440 mg (6/1/2022), 440 mg (6/15/2022)  leucovorin calcium 400 mg/m2 = 990 mg in dextrose 5 % 250 mL infusion, 400 mg/m2 = 990 mg, Intravenous, Clinic/[facility] 1 time, 25 of 25 cycles  Dose modification: 400 mg/m2 (original dose 400 mg/m2, Cycle 24)  Administration: 990 mg (6/16/2021), 990 mg (6/30/2021), 990 mg (7/14/2021), 980 mg (7/28/2021), 990 mg (8/11/2021), 990 mg (8/25/2021), 990 mg (9/8/2021), 990 mg (9/22/2021), 990 mg (10/6/2021), 990 mg (10/20/2021), 990 mg (11/3/2021), 990 mg (12/1/2021), 990 mg (12/15/2021), 990 mg (11/17/2021), 990 mg (12/28/2021), 1,010 mg (5/11/2022), 1,000 mg (3/9/2022), 1,000 mg (2/23/2022), 990 mg (2/2/2022), 990 mg (1/19/2022), 1,000 mg (4/20/2022), 1,000 mg (4/6/2022), 1,000 mg (3/24/2022), 1,010 mg (6/1/2022), 1,010 mg (6/15/2022)     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   4/3/2020 Initial Diagnosis    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     5/6/2020 - 11/17/2020 Chemotherapy    Treatment Summary   Plan Name: OP FOLFOX 6 Q2W  Treatment Goal:  Curative  Status: Inactive  Start Date: 5/6/2020  End Date: 11/6/2020  Provider: Dylan Leyva MD  Chemotherapy: fluorouraciL injection 945 mg, 400 mg/m2 = 945 mg, Intravenous, Clinic/[facility] 1 time, 14 of 14 cycles  Administration: 945 mg (5/6/2020), 945 mg (5/20/2020), 945 mg (6/3/2020), 945 mg (6/17/2020), 945 mg (7/29/2020), 945 mg (8/12/2020), 945 mg (8/26/2020), 945 mg (9/9/2020), 945 mg (9/23/2020), 945 mg (10/6/2020), 945 mg (10/21/2020), 945 mg (11/4/2020)  fluorouraciL 2,400 mg/m2 = 5,665 mg in sodium chloride 0.9% 240 mL chemo infusion, 2,400 mg/m2 = 5,665 mg, Intravenous, Over 46 hours, 14 of 14 cycles  Administration: 5,665 mg (5/6/2020), 5,665 mg (5/20/2020), 5,665 mg (6/3/2020), 5,665 mg (6/17/2020), 5,665 mg (7/29/2020), 5,665 mg (8/12/2020), 5,665 mg (8/26/2020), 5,665 mg (9/9/2020), 5,665 mg (9/23/2020), 5,665 mg (10/6/2020), 5,665 mg (10/21/2020), 5,665 mg (11/4/2020)  leucovorin calcium 900 mg in dextrose 5 % 250 mL infusion, 945 mg, Intravenous, Clinic/[facility] 1 time, 14 of 14 cycles  Administration: 900 mg (5/6/2020), 900 mg (5/20/2020), 900 mg (6/3/2020), 900 mg (6/17/2020), 945 mg (6/30/2020), 945 mg (7/20/2020), 945 mg (7/29/2020), 945 mg (8/12/2020), 945 mg (8/26/2020), 945 mg (9/9/2020), 945 mg (9/23/2020), 945 mg (10/6/2020), 945 mg (10/21/2020), 945 mg (11/4/2020)  oxaliplatin (ELOXATIN) 200 mg in dextrose 5 % 500 mL chemo infusion, 201 mg, Intravenous, Clinic/[facility] 1 time, 6 of 6 cycles  Dose modification: 65 mg/m2 (original dose 85 mg/m2, Cycle 6)  Administration: 200 mg (5/6/2020), 200 mg (5/20/2020), 200 mg (6/3/2020), 200 mg (6/17/2020), 201 mg (6/30/2020), 150 mg (7/20/2020)     6/16/2021 -  Chemotherapy    Treatment Summary   Plan Name: OP COLORECTAL FOLFIRI + BEVACIZUMAB Q2W  Treatment Goal: Control  Status: Active  Start Date: 6/16/2021  End Date: 6/17/2022  Provider: Marah Snato MD  Chemotherapy: fluorouraciL injection 990 mg, 400 mg/m2 = 990 mg, Intravenous, Clinic/[facility] 1 time,  15 of 15 cycles  Administration: 990 mg (6/16/2021), 990 mg (6/30/2021), 990 mg (7/14/2021), 980 mg (7/28/2021), 990 mg (8/11/2021), 990 mg (8/25/2021), 990 mg (9/8/2021), 990 mg (9/22/2021), 990 mg (10/6/2021), 990 mg (10/20/2021), 990 mg (11/3/2021), 990 mg (12/1/2021), 990 mg (12/15/2021), 990 mg (11/17/2021), 990 mg (12/28/2021)  fluorouraciL 2,400 mg/m2 = 5,950 mg in sodium chloride 0.9% 240 mL chemo infusion, 2,400 mg/m2 = 5,950 mg, Intravenous, Over 46 hours, 25 of 25 cycles  Administration: 5,950 mg (6/16/2021), 5,950 mg (6/30/2021), 5,950 mg (7/14/2021), 5,880 mg (7/28/2021), 5,930 mg (8/11/2021), 5,930 mg (8/25/2021), 5,930 mg (9/8/2021), 5,930 mg (9/22/2021), 5,930 mg (10/6/2021), 5,930 mg (10/20/2021), 5,930 mg (11/3/2021), 5,930 mg (12/1/2021), 5,930 mg (12/15/2021), 5,930 mg (11/17/2021), 5,930 mg (12/28/2021), 6,050 mg (5/11/2022), 6,025 mg (3/9/2022), 6,025 mg (2/23/2022), 5,930 mg (2/2/2022), 5,930 mg (1/19/2022), 6,050 mg (4/20/2022), 6,025 mg (4/6/2022), 6,025 mg (3/23/2022), 6,050 mg (6/1/2022), 6,050 mg (6/15/2022)  bevacizumab (AVASTIN) 600 mg in sodium chloride 0.9% 100 mL chemo infusion, 660 mg, Intravenous, Clinic/[facility] 1 time, 23 of 23 cycles  Administration: 600 mg (6/30/2021), 600 mg (7/14/2021), 600 mg (7/28/2021), 600 mg (6/16/2021), 600 mg (8/11/2021), 655 mg (8/25/2021), 600 mg (9/8/2021), 600 mg (9/22/2021), 600 mg (10/6/2021), 600 mg (10/20/2021), 600 mg (11/3/2021), 655 mg (12/1/2021), 600 mg (12/15/2021), 600 mg (11/17/2021), 600 mg (12/28/2021), 600 mg (5/11/2022), 600 mg (3/9/2022), 600 mg (2/23/2022), 600 mg (2/2/2022), 600 mg (1/19/2022), 600 mg (4/20/2022), 680 mg (4/6/2022), 600 mg (3/23/2022)  irinotecan (CAMPTOSAR) 440 mg in sodium chloride 0.9% 500 mL chemo infusion, 446 mg, Intravenous, Clinic/[facility] 1 time, 25 of 25 cycles  Dose modification: 180 mg/m2 (original dose 180 mg/m2, Cycle 24)  Administration: 440 mg (6/16/2021), 440 mg (6/30/2021), 440 mg (7/14/2021), 440 mg  (2021), 440 mg (2021), 444 mg (2021), 440 mg (2021), 440 mg (2021), 440 mg (10/6/2021), 440 mg (10/20/2021), 440 mg (11/3/2021), 440 mg (2021), 440 mg (12/15/2021), 440 mg (2021), 440 mg (2021), 440 mg (2022), 440 mg (3/9/2022), 440 mg (2022), 440 mg (2022), 440 mg (2022), 440 mg (2022), 440 mg (2022), 440 mg (3/24/2022), 440 mg (2022), 440 mg (6/15/2022)  leucovorin calcium 400 mg/m2 = 990 mg in dextrose 5 % 250 mL infusion, 400 mg/m2 = 990 mg, Intravenous, Clinic/[facility] 1 time, 25 of 25 cycles  Dose modification: 400 mg/m2 (original dose 400 mg/m2, Cycle 24)  Administration: 990 mg (2021), 990 mg (2021), 990 mg (2021), 980 mg (2021), 990 mg (2021), 990 mg (2021), 990 mg (2021), 990 mg (2021), 990 mg (10/6/2021), 990 mg (10/20/2021), 990 mg (11/3/2021), 990 mg (2021), 990 mg (12/15/2021), 990 mg (2021), 990 mg (2021), 1,010 mg (2022), 1,000 mg (3/9/2022), 1,000 mg (2022), 990 mg (2022), 990 mg (2022), 1,000 mg (2022), 1,000 mg (2022), 1,000 mg (3/24/2022), 1,010 mg (2022), 1,010 mg (6/15/2022)         History:  Past Medical History:   Diagnosis Date    Anxiety     Depression     FH: ovarian cancer 3/16/2020    Hx of psychiatric care     Effexor, Paxil, Lexapro, Zoloft, Wellbutrin, Trazodone Buspar    Hyperthyroidism     Hypothyroid     Kidney calculi     Malignant neoplasm of sigmoid colon 3/16/2020    Menorrhagia     Multinodular goiter 2012    Palpitation     Psychiatric problem     Venous insufficiency       Past Surgical History:   Procedure Laterality Date     SECTION, CLASSIC      x3    COLONOSCOPY N/A 2020    Procedure: COLONOSCOPY;  Surgeon: Shane Parker MD;  Location: [facility];  Service: Endoscopy;  Laterality: N/A;    COLONOSCOPY N/A 2022    Procedure: COLONOSCOPY;  Surgeon: Shane Parker MD;   Location: [facility] ENDO;  Service: Endoscopy;  Laterality: N/A;    CYSTOSCOPY W/ URETERAL STENT PLACEMENT Bilateral 3/25/2020    Procedure: CYSTOSCOPY, WITH URETERAL STENT INSERTION;  Surgeon: Claudio Tyson MD;  [facility];  Service: Urology;  Laterality: Bilateral;    INSERTION OF TUNNELED CENTRAL VENOUS CATHETER (CVC) WITH SUBCUTANEOUS PORT N/A 5/1/2020    Procedure: [id number];  Surgeon: Dosc Diagnostic Provider;  Location: [facility] OR 2ND FLR;  Service: Radiology;  Laterality: N/A;  Room 189/Cindy    LAPAROSCOPIC SIGMOIDECTOMY N/A 3/25/2020    Procedure: COLECTOMY, SIGMOID, LAPAROSCOPIC, flex sig, ERAS high;  Surgeon: Silvio Man MD;  [facility];  Service: Colon and Rectal;  Laterality: N/A;    MOBILIZATION OF SPLENIC FLEXURE  3/25/2020    Procedure: MOBILIZATION, SPLENIC FLEXURE;  Surgeon: Silvio Man MD;  [facility];  Service: Colon and Rectal;;    tonsillectomy      TOTAL ABDOMINAL HYSTERECTOMY W/ BILATERAL SALPINGOOPHORECTOMY N/A 3/25/2020    Procedure: HYSTERECTOMY, TOTAL, ABDOMINAL, WITH BILATERAL SALPINGO-OOPHORECTOMY;  Surgeon: Keron Brady MD;  [facility];  Service: Oncology;  Laterality: N/A;     Family History   Problem Relation Age of Onset    Heart disease Father     Diabetes Mother 65    Drug abuse Brother     Drug abuse Brother     Cancer Maternal Aunt         lung cancer    Cancer Maternal Grandmother         stomach cancer- started in ovaries    Ovarian cancer Maternal Grandmother         stomach cancer- started in ovaries    Colon cancer Paternal Uncle     Cancer Paternal Uncle     Ovarian cancer Maternal Aunt     Ovarian cancer Maternal Aunt     Ovarian cancer Cousin         mother had ovarian cancer    Drug abuse Son     Drug abuse Son         clean/sober since 2012    Colon cancer Son     No Known Problems Daughter     Uterine cancer Neg Hx     Breast cancer Neg Hx      Social  "History     Tobacco Use    Smoking status: Never Smoker    Smokeless tobacco: Never Used   Substance and Sexual Activity    Alcohol use: Yes     Comment: occasionally- twice monthly    Drug use: Never    Sexual activity: Yes     Partners: Male     Birth control/protection: See Surgical Hx        ROS:   Review of Systems   Constitutional: Negative for fever, malaise/fatigue and weight loss.   HENT: Negative for congestion, hearing loss, nosebleeds and sore throat.    Eyes: Negative for double vision and photophobia.   Respiratory: Negative for cough, hemoptysis, sputum production, shortness of breath and wheezing.    Cardiovascular: Negative for chest pain, palpitations, orthopnea and leg swelling.   Gastrointestinal: Negative for abdominal pain, blood in stool, constipation, diarrhea, heartburn, nausea and vomiting.   Genitourinary: Negative for dysuria, frequency, hematuria and urgency.   Musculoskeletal: Negative for back pain, joint pain and myalgias.   Skin: Negative for itching and rash.   Neurological: Negative for dizziness, tingling, seizures, weakness and headaches.   Endo/Heme/Allergies: Negative for polydipsia. Does not bruise/bleed easily.   Psychiatric/Behavioral: Negative for depression and memory loss. The patient is nervous/anxious. The patient does not have insomnia.         Objective:     Vitals:    08/15/22 1418   BP: (!) 152/92   Pulse: 85   Resp: 18   Temp: 98.1 °F (36.7 °C)   TempSrc: Temporal   SpO2: 96%   Weight: 131.3 kg (289 lb 7.4 oz)   Height: 5' 6" (1.676 m)   PainSc:   3     Wt Readings from Last 10 Encounters:   08/15/22 131.3 kg (289 lb 7.4 oz)   06/21/22 131.1 kg (289 lb)   06/17/22 131 kg (288 lb 12.8 oz)   06/15/22 134.5 kg (296 lb 8.3 oz)   06/15/22 134.5 kg (296 lb 8.3 oz)   06/13/22 131.5 kg (289 lb 14.5 oz)   06/06/22 132.5 kg (292 lb 1.8 oz)   06/06/22 132.5 kg (292 lb 1.8 oz)   06/01/22 135.9 kg (299 lb 9.7 oz)   05/30/22 135.9 kg (299 lb 9.7 oz)       Physical " Examination:   Physical Exam  Vitals and nursing note reviewed.   Constitutional:       General: She is not in acute distress.     Appearance: She is not diaphoretic.   HENT:      Head: Normocephalic.      Mouth/Throat:      Pharynx: No oropharyngeal exudate.   Eyes:      General: No scleral icterus.     Conjunctiva/sclera: Conjunctivae normal.   Neck:      Thyroid: No thyromegaly.   Cardiovascular:      Rate and Rhythm: Normal rate and regular rhythm.      Heart sounds: Normal heart sounds. No murmur heard.  Pulmonary:      Effort: Pulmonary effort is normal. No respiratory distress.      Breath sounds: No stridor. No wheezing or rales.   Chest:      Chest wall: No tenderness.   Abdominal:      General: Bowel sounds are normal. There is no distension.      Palpations: Abdomen is soft. There is no mass.      Tenderness: There is no abdominal tenderness. There is no rebound.      Comments: + midline incision, healing well    Musculoskeletal:         General: No tenderness or deformity. Normal range of motion.      Cervical back: Neck supple.   Lymphadenopathy:      Cervical: No cervical adenopathy.   Skin:     General: Skin is warm and dry.      Findings: No erythema or rash.   Neurological:      Mental Status: She is alert and oriented to person, place, and time.      Cranial Nerves: No cranial nerve deficit.      Coordination: Coordination normal.      Gait: Gait is intact.   Psychiatric:         Mood and Affect: Affect normal.         Cognition and Memory: Memory normal.         Judgment: Judgment normal.          Diagnostic Tests:  Significant Imaging: I have reviewed and interpreted all pertinent imaging results/findings.  Laboratory Data:  All pertinent labs have been reviewed.  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 Oxaliplatin was stopped due to severe adverse reaction.     Follow-up CTs and PET in May 2021 showed enlarging retroperitoneal node, concerning for metastatic disease.      She started FOLFIRI + Avastin and has continued till July 2022.   Given isolated RP toni disease, she has undergone open Left periaortic and aortocaval lymph node dissection on 7/12/2022. Recovering well post op.     Hold further chemotherapy at this time.She will have follow up scans at [facility] and will review and resume treatment as clinically indicated.     Transaminitis  Fluctuates.  Continue to monitor.    Hypercalcemia   Mild, secondary to hyperparathyroidism.  Will see Endocrinology at the end of the month. Avoid calcium supplements.     Anxiety   Will schedule a follow up with Dr. Palm.        ECOG SCORE    1 - Restricted in strenuous activity-ambulatory and able to carry out work of a light nature           Discussion:   No follow-ups on file.    Plan was discussed with the patient at length, and she verbalized understanding. Gail was given an opportunity to ask questions that were answered to her satisfaction, and she was advised to call in the interval if any problems or questions arise.    Electronically signed by Marah Santo MD      Answers for HPI/ROS submitted by the patient on 8/11/2022  appetite change : No  unexpected weight change: No  mouth sores: No  visual disturbance: No  adenopathy: No    Route Chart for Scheduling    Med Onc Chart  Routing      Follow up with physician . 10/3 labs and port flush same day    Follow up with TAVO    Infusion scheduling note    Injection scheduling note 10/3  port flush    Labs CMP and CBC   Lab interval:     Imaging    Pharmacy appointment    Other referrals          Treatment Plan Information   OP COLORECTAL FOLFIRI + BEVACIZUMAB Q2W   Marah Santo MD   Upcoming Treatment Dates - OP COLORECTAL FOLFIRI + BEVACIZUMAB Q2W    No upcoming days in selected categories.    Supportive Plan Information  IV FLUIDS AND ELECTROLYTES   Brayden Solo MD   Upcoming Treatment Dates - IV FLUIDS AND ELECTROLYTES    No upcoming days in selected categories.    Therapy Plan Information  PORT FLUSH  Flushes  heparin, porcine (PF) 100 unit/mL injection flush 500 Units  500 Units, Intravenous, Every visit  sodium chloride 0.9% flush 10 mL  10 mL, Intravenous, Every visit

## 2022-08-24 ENCOUNTER — TELEPHONE (OUTPATIENT)
Dept: INFUSION THERAPY | Facility: HOSPITAL | Age: 52
End: 2022-08-24
Payer: COMMERCIAL

## 2022-08-24 DIAGNOSIS — C18.7 MALIGNANT NEOPLASM OF SIGMOID COLON: Primary | ICD-10-CM

## 2022-08-24 RX ORDER — CYCLOBENZAPRINE HCL 5 MG
5 TABLET ORAL 3 TIMES DAILY PRN
Qty: 30 TABLET | Refills: 0 | Status: SHIPPED | OUTPATIENT
Start: 2022-08-24 | End: 2022-09-03

## 2022-08-24 NOTE — TELEPHONE ENCOUNTER
SW received a message from Joellen KATE that pt was trying to reach this SW. SW called pt and left a message with number to SW office for return call.     Radha Giordano, JENNIEW

## 2022-08-25 ENCOUNTER — TELEPHONE (OUTPATIENT)
Dept: INFUSION THERAPY | Facility: HOSPITAL | Age: 52
End: 2022-08-25
Payer: COMMERCIAL

## 2022-08-29 ENCOUNTER — TELEPHONE (OUTPATIENT)
Dept: INFUSION THERAPY | Facility: HOSPITAL | Age: 52
End: 2022-08-29
Payer: COMMERCIAL

## 2022-08-29 ENCOUNTER — TELEPHONE (OUTPATIENT)
Dept: ENDOCRINOLOGY | Facility: CLINIC | Age: 52
End: 2022-08-29
Payer: COMMERCIAL

## 2022-08-29 ENCOUNTER — OFFICE VISIT (OUTPATIENT)
Dept: ENDOCRINOLOGY | Facility: CLINIC | Age: 52
End: 2022-08-29
Payer: COMMERCIAL

## 2022-08-29 DIAGNOSIS — E03.8 OTHER SPECIFIED HYPOTHYROIDISM: ICD-10-CM

## 2022-08-29 DIAGNOSIS — E04.2 MULTINODULAR GOITER: Primary | ICD-10-CM

## 2022-08-29 PROCEDURE — 99214 OFFICE O/P EST MOD 30 MIN: CPT | Mod: 95,,, | Performed by: INTERNAL MEDICINE

## 2022-08-29 PROCEDURE — 1159F PR MEDICATION LIST DOCUMENTED IN MEDICAL RECORD: ICD-10-PCS | Mod: CPTII,95,, | Performed by: INTERNAL MEDICINE

## 2022-08-29 PROCEDURE — 1160F RVW MEDS BY RX/DR IN RCRD: CPT | Mod: CPTII,95,, | Performed by: INTERNAL MEDICINE

## 2022-08-29 PROCEDURE — 1160F PR REVIEW ALL MEDS BY PRESCRIBER/CLIN PHARMACIST DOCUMENTED: ICD-10-PCS | Mod: CPTII,95,, | Performed by: INTERNAL MEDICINE

## 2022-08-29 PROCEDURE — 99214 PR OFFICE/OUTPT VISIT, EST, LEVL IV, 30-39 MIN: ICD-10-PCS | Mod: 95,,, | Performed by: INTERNAL MEDICINE

## 2022-08-29 PROCEDURE — 1159F MED LIST DOCD IN RCRD: CPT | Mod: CPTII,95,, | Performed by: INTERNAL MEDICINE

## 2022-08-29 NOTE — PROGRESS NOTES
The patient location is: [place]     Visit type: audiovisual    Face to Face time with patient: 11  11 minutes of total time spent on the encounter, which includes face to face time and non-face to face time preparing to see the patient (eg, review of tests), Obtaining and/or reviewing separately obtained history, Documenting clinical information in the electronic or other health record, Independently interpreting results (not separately reported) and communicating results to the patient/family/caregiver, or Care coordination (not separately reported).         Each patient to whom he or she provides medical services by telemedicine is:  (1) informed of the relationship between the physician and patient and the respective role of any other health care provider with respect to management of the patient; and (2) notified that he or she may decline to receive medical services by telemedicine and may withdraw from such care at any time.    Notes:         CHIEF COMPLAINT: Hypothyroidism  52 y.o.  being seen as a f/u. She has a history of Graves' disease which has been treated. Subsequently developed hypothyroidism. On synthroid 200 daily. In interim has had colon cancer. Recently went to [facility] to have abd LN removed. No XRT to head/neck. No difficulty swallowing. Had a PET CT showing increased metabolism on left thyroid nodule.        Left Thyroid FNA 12            PAST MEDICAL HISTORY: Hypothyroidism after treatment for Graves' disease. 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 There is heart disease in the family     MEDICATIONS/ALLERGIES: The patient's MedCard has been updated and reviewed.     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  The patient is status post sigmoid colon resection and chemotherapy.     TECHNIQUE:  Following IV injection of 11.32 mCi F-18 FDG, 3D PET imaging was performed from the skull base to the mid thighs.  A noncontrast non breath hold CT was obtained in conjunction with the PET scan for attenuation correction and anatomic correlation.  PET-CT fusion images were generated.     COMPARISON:  01/06/2022     FINDINGS:  Head/neck:     A hypermetabolic left thyroid lobe nodule is unchanged in size.  On image 59 the nodule measures 1.1 cm with a max SUV of 13.6 compared to 11.2 previously.  No new abnormal hypermetabolic foci have developed in the head and neck region since the prior study and no lymphadenopathy is present.     Chest:     No significant abnormal hypermetabolic foci are identified within the chest.  No hypermetabolic pulmonary nodules, lymphadenopathy, pleural effusion, or pericardial effusion are present.     Abdomen/pelvis:     A hypermetabolic enlarged left periaortic retroperitoneal lymph node has increased in size and degree of hypermetabolism since the prior study.  On image 182 the node now measures 1.9 x 1.8 cm compared to 1.7 x 1.7 cm previously and has a max SUV of 5.8 compared to 3.9 previously.  No other new significant foci of abnormal hypermetabolism have developed in the abdomen and pelvis.  The adrenal glands are normal.  Nonobstructing bilateral renal calculi and gallstones within the gallbladder are again noted.     Skeleton:     No significant abnormal hypermetabolic foci are identified within the skeleton.  There are no findings to suggest osseous neoplastic disease.     Impression:     1. Interval increase in the size and degree of hypermetabolism of an enlarged left periaortic retroperitoneal lymph node since the prior study.  This finding suggests progression of metastatic disease.  2. No significant change in the size or degree of  hypermetabolism of a left thyroid lobe nodule.      ASSESSMENT/PLAN:  1. Hypothyroidism- . Continue current Tx- TSH within normal limits.  Symptomatically doing well.  Continue current treatment.     2. Multinodular Goiter- No XRT.  Recently had a PET-CT showing a hypermetabolic left thyroid nodule.  Max SUV 13.6.  Schedule ultrasound.  Discussed most likely will need FNA, so will go ahead and schedule.  Reviewed records from [facility] everywhere     3. Morbid Obesity-      FOLLOWUP  Thyroid Ultrasound.   Schedule for FNA after US

## 2022-08-29 NOTE — TELEPHONE ENCOUNTER
SW spoke with pt regarding resources from [facility] and [employer].  SW will forward pt an application for rental assistance from [facility]. Pt will work on application and get her landlord to sign form as well. When completed SW to forward to [facility].     SW has also sent application to [employer] to request meals for pt. Pt was grateful.     SW to follow up.       Radha Giordano, JENNIEW

## 2022-08-29 NOTE — TELEPHONE ENCOUNTER
LVM for pt to call to sched US.  FNA tentatively sched for 10/3 at 11:30 (next avail).  Will cancel if FNA not needed once US reviewed.

## 2022-08-31 ENCOUNTER — TELEPHONE (OUTPATIENT)
Dept: HEMATOLOGY/ONCOLOGY | Facility: CLINIC | Age: 52
End: 2022-08-31
Payer: COMMERCIAL

## 2022-08-31 ENCOUNTER — PATIENT MESSAGE (OUTPATIENT)
Dept: HEMATOLOGY/ONCOLOGY | Facility: CLINIC | Age: 52
End: 2022-08-31
Payer: COMMERCIAL

## 2022-09-01 ENCOUNTER — PATIENT MESSAGE (OUTPATIENT)
Dept: HEMATOLOGY/ONCOLOGY | Facility: CLINIC | Age: 52
End: 2022-09-01
Payer: COMMERCIAL

## 2022-09-01 ENCOUNTER — TELEPHONE (OUTPATIENT)
Dept: INFUSION THERAPY | Facility: HOSPITAL | Age: 52
End: 2022-09-01
Payer: COMMERCIAL

## 2022-09-01 NOTE — TELEPHONE ENCOUNTER
JULIANNE called pt and requested she send JULIANNE a copy of her lease needed for [employer] to move forward with request to pay part of her rent. Pt will send to JULIANNE today.     Radha Giordano, JENNIEW

## 2022-09-07 ENCOUNTER — DOCUMENTATION ONLY (OUTPATIENT)
Dept: INFUSION THERAPY | Facility: HOSPITAL | Age: 52
End: 2022-09-07
Payer: COMMERCIAL

## 2022-09-07 NOTE — PROGRESS NOTES
JULIANNE spoke with pt regarding assistance from [employer] for pt's rent. Pt resent SW copy of her lease for SW to forwarded Deloris with [employer]. Deloris can now send partial payment for pt's rent to [employer] covering $1000 of the rent. Pt is thankful.     SW also notified pt meals form [employer] are here and she can pick them up at any time. Pt shared she is returning to work this week and will come be to get meals this week.     Radha Giordano, JENNIEW

## 2022-09-12 ENCOUNTER — HOSPITAL ENCOUNTER (OUTPATIENT)
Dept: RADIOLOGY | Facility: HOSPITAL | Age: 52
Discharge: HOME | End: 2022-09-12
Attending: INTERNAL MEDICINE
Payer: COMMERCIAL

## 2022-09-12 DIAGNOSIS — E04.2 MULTINODULAR GOITER: ICD-10-CM

## 2022-09-12 PROCEDURE — 76536 US SOFT TISSUE HEAD NECK THYROID: ICD-10-PCS | Mod: 26,,, | Performed by: RADIOLOGY

## 2022-09-12 PROCEDURE — 76536 US EXAM OF HEAD AND NECK: CPT | Mod: TC,PO

## 2022-09-12 PROCEDURE — 76536 US EXAM OF HEAD AND NECK: CPT | Mod: 26,,, | Performed by: RADIOLOGY

## 2022-09-29 ENCOUNTER — PATIENT MESSAGE (OUTPATIENT)
Dept: HEMATOLOGY/ONCOLOGY | Facility: CLINIC | Age: 52
End: 2022-09-29
Payer: COMMERCIAL

## 2022-09-29 ENCOUNTER — TELEPHONE (OUTPATIENT)
Dept: ENDOCRINOLOGY | Facility: CLINIC | Age: 52
End: 2022-09-29
Payer: COMMERCIAL

## 2022-09-29 NOTE — TELEPHONE ENCOUNTER
We tentatively sched FNA for 10/3 in case needed after US results.  Please review the US and advise if we should keep or cancel FNA appt.

## 2022-09-30 NOTE — TELEPHONE ENCOUNTER
The ultrasound does not show any changed.  However since the PET scan did show some increased metabolism, best to do another biopsy.

## 2022-09-30 NOTE — TELEPHONE ENCOUNTER
"Spoke w/ pt, she is not wanting to do the FNA right now.  States she now has "cancer in lymph nodes, has too much going on right now".  Will convert the appt to a virtual visit so she can discuss further with Dr. Moss.    "

## 2022-10-03 ENCOUNTER — INFUSION (OUTPATIENT)
Dept: INFUSION THERAPY | Facility: HOSPITAL | Age: 52
End: 2022-10-03
Attending: INTERNAL MEDICINE
Payer: COMMERCIAL

## 2022-10-03 ENCOUNTER — OFFICE VISIT (OUTPATIENT)
Dept: HEMATOLOGY/ONCOLOGY | Facility: CLINIC | Age: 52
End: 2022-10-03
Payer: COMMERCIAL

## 2022-10-03 ENCOUNTER — OFFICE VISIT (OUTPATIENT)
Dept: ENDOCRINOLOGY | Facility: CLINIC | Age: 52
End: 2022-10-03
Payer: COMMERCIAL

## 2022-10-03 VITALS
RESPIRATION RATE: 18 BRPM | WEIGHT: 292.56 LBS | OXYGEN SATURATION: 98 % | HEART RATE: 81 BPM | SYSTOLIC BLOOD PRESSURE: 140 MMHG | HEIGHT: 66 IN | TEMPERATURE: 98 F | BODY MASS INDEX: 47.02 KG/M2 | DIASTOLIC BLOOD PRESSURE: 82 MMHG

## 2022-10-03 DIAGNOSIS — C18.7 MALIGNANT NEOPLASM OF SIGMOID COLON: Primary | ICD-10-CM

## 2022-10-03 DIAGNOSIS — C77.2 METASTASIS TO INTESTINAL LYMPH NODE: ICD-10-CM

## 2022-10-03 DIAGNOSIS — F41.9 ANXIETY: ICD-10-CM

## 2022-10-03 DIAGNOSIS — E04.2 MULTINODULAR GOITER: Primary | ICD-10-CM

## 2022-10-03 DIAGNOSIS — E83.52 HYPERCALCEMIA: ICD-10-CM

## 2022-10-03 DIAGNOSIS — C77.9 SECONDARY ADENOCARCINOMA OF LYMPH NODE: ICD-10-CM

## 2022-10-03 LAB
ALBUMIN SERPL BCP-MCNC: 3.7 G/DL (ref 3.5–5.2)
ALP SERPL-CCNC: 181 U/L (ref 55–135)
ALT SERPL W/O P-5'-P-CCNC: 58 U/L (ref 10–44)
ANION GAP SERPL CALC-SCNC: 7 MMOL/L (ref 8–16)
AST SERPL-CCNC: 40 U/L (ref 10–40)
BASOPHILS # BLD AUTO: 0.03 K/UL (ref 0–0.2)
BASOPHILS NFR BLD: 0.4 % (ref 0–1.9)
BILIRUB SERPL-MCNC: 0.4 MG/DL (ref 0.1–1)
BUN SERPL-MCNC: 11 MG/DL (ref 6–20)
CALCIUM SERPL-MCNC: 10.2 MG/DL (ref 8.7–10.5)
CHLORIDE SERPL-SCNC: 107 MMOL/L (ref 95–110)
CO2 SERPL-SCNC: 24 MMOL/L (ref 23–29)
CREAT SERPL-MCNC: 0.8 MG/DL (ref 0.5–1.4)
DIFFERENTIAL METHOD: NORMAL
EOSINOPHIL # BLD AUTO: 0.3 K/UL (ref 0–0.5)
EOSINOPHIL NFR BLD: 4.2 % (ref 0–8)
ERYTHROCYTE [DISTWIDTH] IN BLOOD BY AUTOMATED COUNT: 14.5 % (ref 11.5–14.5)
EST. GFR  (NO RACE VARIABLE): >60 ML/MIN/1.73 M^2
GLUCOSE SERPL-MCNC: 117 MG/DL (ref 70–110)
HCT VFR BLD AUTO: 44 % (ref 37–48.5)
HGB BLD-MCNC: 14.3 G/DL (ref 12–16)
IMM GRANULOCYTES # BLD AUTO: 0.02 K/UL (ref 0–0.04)
IMM GRANULOCYTES NFR BLD AUTO: 0.3 % (ref 0–0.5)
LYMPHOCYTES # BLD AUTO: 1.9 K/UL (ref 1–4.8)
LYMPHOCYTES NFR BLD: 27.9 % (ref 18–48)
MCH RBC QN AUTO: 28.4 PG (ref 27–31)
MCHC RBC AUTO-ENTMCNC: 32.5 G/DL (ref 32–36)
MCV RBC AUTO: 87 FL (ref 82–98)
MONOCYTES # BLD AUTO: 0.6 K/UL (ref 0.3–1)
MONOCYTES NFR BLD: 9 % (ref 4–15)
NEUTROPHILS # BLD AUTO: 3.9 K/UL (ref 1.8–7.7)
NEUTROPHILS NFR BLD: 58.2 % (ref 38–73)
NRBC BLD-RTO: 0 /100 WBC
PLATELET # BLD AUTO: 281 K/UL (ref 150–450)
PMV BLD AUTO: 10.2 FL (ref 9.2–12.9)
POTASSIUM SERPL-SCNC: 4 MMOL/L (ref 3.5–5.1)
PROT SERPL-MCNC: 7.4 G/DL (ref 6–8.4)
RBC # BLD AUTO: 5.04 M/UL (ref 4–5.4)
SODIUM SERPL-SCNC: 138 MMOL/L (ref 136–145)
WBC # BLD AUTO: 6.67 K/UL (ref 3.9–12.7)

## 2022-10-03 PROCEDURE — 36591 DRAW BLOOD OFF VENOUS DEVICE: CPT | Mod: PN

## 2022-10-03 PROCEDURE — 3079F DIAST BP 80-89 MM HG: CPT | Mod: CPTII,S$GLB,, | Performed by: INTERNAL MEDICINE

## 2022-10-03 PROCEDURE — 99999 PR PBB SHADOW E&M-EST. PATIENT-LVL IV: ICD-10-PCS | Mod: PBBFAC,,, | Performed by: INTERNAL MEDICINE

## 2022-10-03 PROCEDURE — 1159F PR MEDICATION LIST DOCUMENTED IN MEDICAL RECORD: ICD-10-PCS | Mod: CPTII,95,, | Performed by: INTERNAL MEDICINE

## 2022-10-03 PROCEDURE — 99215 PR OFFICE/OUTPT VISIT, EST, LEVL V, 40-54 MIN: ICD-10-PCS | Mod: S$GLB,,, | Performed by: INTERNAL MEDICINE

## 2022-10-03 PROCEDURE — 1159F MED LIST DOCD IN RCRD: CPT | Mod: CPTII,95,, | Performed by: INTERNAL MEDICINE

## 2022-10-03 PROCEDURE — 3008F PR BODY MASS INDEX (BMI) DOCUMENTED: ICD-10-PCS | Mod: CPTII,S$GLB,, | Performed by: INTERNAL MEDICINE

## 2022-10-03 PROCEDURE — A4216 STERILE WATER/SALINE, 10 ML: HCPCS | Mod: PN | Performed by: INTERNAL MEDICINE

## 2022-10-03 PROCEDURE — 99999 PR PBB SHADOW E&M-EST. PATIENT-LVL IV: CPT | Mod: PBBFAC,,, | Performed by: INTERNAL MEDICINE

## 2022-10-03 PROCEDURE — 99215 OFFICE O/P EST HI 40 MIN: CPT | Mod: S$GLB,,, | Performed by: INTERNAL MEDICINE

## 2022-10-03 PROCEDURE — 3008F BODY MASS INDEX DOCD: CPT | Mod: CPTII,S$GLB,, | Performed by: INTERNAL MEDICINE

## 2022-10-03 PROCEDURE — 80053 COMPREHEN METABOLIC PANEL: CPT | Mod: PN | Performed by: INTERNAL MEDICINE

## 2022-10-03 PROCEDURE — 3077F SYST BP >= 140 MM HG: CPT | Mod: CPTII,S$GLB,, | Performed by: INTERNAL MEDICINE

## 2022-10-03 PROCEDURE — 1160F PR REVIEW ALL MEDS BY PRESCRIBER/CLIN PHARMACIST DOCUMENTED: ICD-10-PCS | Mod: CPTII,95,, | Performed by: INTERNAL MEDICINE

## 2022-10-03 PROCEDURE — 3077F PR MOST RECENT SYSTOLIC BLOOD PRESSURE >= 140 MM HG: ICD-10-PCS | Mod: CPTII,S$GLB,, | Performed by: INTERNAL MEDICINE

## 2022-10-03 PROCEDURE — 63600175 PHARM REV CODE 636 W HCPCS: Mod: PN | Performed by: INTERNAL MEDICINE

## 2022-10-03 PROCEDURE — 1160F RVW MEDS BY RX/DR IN RCRD: CPT | Mod: CPTII,95,, | Performed by: INTERNAL MEDICINE

## 2022-10-03 PROCEDURE — 1159F MED LIST DOCD IN RCRD: CPT | Mod: CPTII,S$GLB,, | Performed by: INTERNAL MEDICINE

## 2022-10-03 PROCEDURE — 85025 COMPLETE CBC W/AUTO DIFF WBC: CPT | Mod: PN | Performed by: INTERNAL MEDICINE

## 2022-10-03 PROCEDURE — 1159F PR MEDICATION LIST DOCUMENTED IN MEDICAL RECORD: ICD-10-PCS | Mod: CPTII,S$GLB,, | Performed by: INTERNAL MEDICINE

## 2022-10-03 PROCEDURE — 99213 PR OFFICE/OUTPT VISIT, EST, LEVL III, 20-29 MIN: ICD-10-PCS | Mod: 95,,, | Performed by: INTERNAL MEDICINE

## 2022-10-03 PROCEDURE — 99213 OFFICE O/P EST LOW 20 MIN: CPT | Mod: 95,,, | Performed by: INTERNAL MEDICINE

## 2022-10-03 PROCEDURE — 25000003 PHARM REV CODE 250: Mod: PN | Performed by: INTERNAL MEDICINE

## 2022-10-03 PROCEDURE — 3079F PR MOST RECENT DIASTOLIC BLOOD PRESSURE 80-89 MM HG: ICD-10-PCS | Mod: CPTII,S$GLB,, | Performed by: INTERNAL MEDICINE

## 2022-10-03 RX ORDER — SODIUM CHLORIDE 0.9 % (FLUSH) 0.9 %
10 SYRINGE (ML) INJECTION
Status: CANCELLED | OUTPATIENT
Start: 2022-10-03

## 2022-10-03 RX ORDER — LORAZEPAM 2 MG/ML
1 INJECTION INTRAMUSCULAR
Status: CANCELLED | OUTPATIENT
Start: 2022-10-05 | End: 2022-10-03

## 2022-10-03 RX ORDER — SODIUM CHLORIDE 0.9 % (FLUSH) 0.9 %
10 SYRINGE (ML) INJECTION
Status: CANCELLED | OUTPATIENT
Start: 2022-10-07

## 2022-10-03 RX ORDER — HEPARIN 100 UNIT/ML
500 SYRINGE INTRAVENOUS
Status: DISCONTINUED | OUTPATIENT
Start: 2022-10-03 | End: 2022-10-03 | Stop reason: HOSPADM

## 2022-10-03 RX ORDER — HEPARIN 100 UNIT/ML
500 SYRINGE INTRAVENOUS
Status: CANCELLED | OUTPATIENT
Start: 2022-10-05

## 2022-10-03 RX ORDER — HEPARIN 100 UNIT/ML
500 SYRINGE INTRAVENOUS
Status: CANCELLED | OUTPATIENT
Start: 2022-10-03

## 2022-10-03 RX ORDER — ATROPINE SULFATE 0.4 MG/ML
0.4 INJECTION, SOLUTION ENDOTRACHEAL; INTRAMEDULLARY; INTRAMUSCULAR; INTRAVENOUS; SUBCUTANEOUS ONCE AS NEEDED
Status: CANCELLED | OUTPATIENT
Start: 2022-10-05

## 2022-10-03 RX ORDER — SODIUM CHLORIDE 0.9 % (FLUSH) 0.9 %
10 SYRINGE (ML) INJECTION
Status: CANCELLED | OUTPATIENT
Start: 2022-10-05

## 2022-10-03 RX ORDER — SODIUM CHLORIDE 0.9 % (FLUSH) 0.9 %
10 SYRINGE (ML) INJECTION
Status: DISCONTINUED | OUTPATIENT
Start: 2022-10-03 | End: 2022-10-03 | Stop reason: HOSPADM

## 2022-10-03 RX ORDER — HEPARIN 100 UNIT/ML
500 SYRINGE INTRAVENOUS
Status: CANCELLED | OUTPATIENT
Start: 2022-10-07

## 2022-10-03 RX ADMIN — Medication 10 ML: at 09:10

## 2022-10-03 RX ADMIN — Medication 500 UNITS: at 09:10

## 2022-10-03 NOTE — PLAN OF CARE
Tolerated port flush well today Pt d/c to home with instructions  To private vehicle without difficulty  NAD   Problem: Adult Inpatient Plan of Care  Goal: Plan of Care Review  Outcome: Met

## 2022-10-03 NOTE — PROGRESS NOTES
PROGRESS NOTE    Subjective:       Patient ID: Gail Mckinnon is a 52 y.o. female.  MRN: [id number]  : 1970    Chief Complaint: Malignant neoplasm of sigmoid colon      History of Present Illness:   Gail Mckinnon is a 52 y.o. female who presents with colon cancer, initially stage III and now with LN recurrence.       She completed adjuvant FOLFOX in 2020. She tolerated only 4 cycles of FOLFOX before she developed an infusion reaction to oxaliplatin in cycle 5 was well as cycle 6. She then completed 6 cycles of infusional 5 FU.     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 She was offered second line therapy with FOLFIRI and Avastin.      She presented to the ED on  with left flank pain. CT renal stone study showed Moderate hydronephrosis on the left secondary to 3 mm calculus at the UP[facility].    She had a PET scan mid April that showed possible progression of her disease with      She has been to [facility] for another opinion. No change was recommended in systemic therapy but she was offered surgical removal of the aorta caval nodes.  Recent sans at [facility]  show stable disease.      Surgery done 22. Received 2 more cycle of FOLFIRI without Avastin.        Interim history:    She presents for a follow up visit. She had repeat imaging at [facility] that unfortunately showed disease progression since her surgery with multiple RP nodes and one mediastinal node.     Goals of care and further treatments were discussed with the patient.     She reports intermittent back pain. She is upset and tearful but managing relatively well overall.     22  CT chest abd pelvis  Thorax:   1.  Development of posterior mediastinal adenopathy.     Abdomen and Pelvis:   1.  Baseline postoperative evaluation.   2.  Development of retroperitoneal and left common iliac adenopathy.   3.  Development of periportal and mesenteric  adenopathy.   4.  Development of a mild left hydronephrosis, secondary to tethering/involvement by retroperitoneal disease/postsurgical change in the retroperitoneum.   5.  Hepatic steatosis.   6.  Nephrolithiasis.       Oncology History:  Oncology History   Malignant neoplasm of sigmoid colon   3/16/2020 Initial Diagnosis    Malignant neoplasm of sigmoid colon     3/31/2020 Cancer Staged    Staging form: Colon and Rectum, AJCC 8th Edition  - Clinical stage from 3/31/2020: Stage IIIC (cT4b, cN2a, cM0)       5/6/2020 - 11/17/2020 Chemotherapy    Treatment Summary   Plan Name: OP FOLFOX 6 Q2W  Treatment Goal: Curative  Status: Inactive  Start Date: 5/6/2020  End Date: 11/6/2020  Provider: Dylan Leyva MD  Chemotherapy: fluorouraciL injection 945 mg, 400 mg/m2 = 945 mg, Intravenous, Clinic/[facility] 1 time, 14 of 14 cycles  Administration: 945 mg (5/6/2020), 945 mg (5/20/2020), 945 mg (6/3/2020), 945 mg (6/17/2020), 945 mg (7/29/2020), 945 mg (8/12/2020), 945 mg (8/26/2020), 945 mg (9/9/2020), 945 mg (9/23/2020), 945 mg (10/6/2020), 945 mg (10/21/2020), 945 mg (11/4/2020)  fluorouraciL 2,400 mg/m2 = 5,665 mg in sodium chloride 0.9% 240 mL chemo infusion, 2,400 mg/m2 = 5,665 mg, Intravenous, Over 46 hours, 14 of 14 cycles  Administration: 5,665 mg (5/6/2020), 5,665 mg (5/20/2020), 5,665 mg (6/3/2020), 5,665 mg (6/17/2020), 5,665 mg (7/29/2020), 5,665 mg (8/12/2020), 5,665 mg (8/26/2020), 5,665 mg (9/9/2020), 5,665 mg (9/23/2020), 5,665 mg (10/6/2020), 5,665 mg (10/21/2020), 5,665 mg (11/4/2020)  leucovorin calcium 900 mg in dextrose 5 % 250 mL infusion, 945 mg, Intravenous, [facility] time, 14 of 14 cycles  Administration: 900 mg (5/6/2020), 900 mg (5/20/2020), 900 mg (6/3/2020), 900 mg (6/17/2020), 945 mg (6/30/2020), 945 mg (7/20/2020), 945 mg (7/29/2020), 945 mg (8/12/2020), 945 mg (8/26/2020), 945 mg (9/9/2020), 945 mg (9/23/2020), 945 mg (10/6/2020), 945 mg (10/21/2020), 945 mg (11/4/2020)  oxaliplatin (ELOXATIN)  200 mg in dextrose 5 % 500 mL chemo infusion, 201 mg, Intravenous, [facility] 1 time, 6 of 6 cycles  Dose modification: 65 mg/m2 (original dose 85 mg/m2, Cycle 6)  Administration: 200 mg (5/6/2020), 200 mg (5/20/2020), 200 mg (6/3/2020), 200 mg (6/17/2020), 201 mg (6/30/2020), 150 mg (7/20/2020)       6/16/2021 -  Chemotherapy    Treatment Summary   Plan Name: OP COLORECTAL FOLFIRI + BEVACIZUMAB Q2W  Treatment Goal: Control  Status: Active  Start Date: 6/16/2021  End Date: 7/15/2022 (Planned)  Provider: Marah Santo MD  Chemotherapy: fluorouraciL injection 990 mg, 400 mg/m2 = 990 mg, Intravenous, Clinic/[facility] 1 time, 15 of 15 cycles  Administration: 990 mg (6/16/2021), 990 mg (6/30/2021), 990 mg (7/14/2021), 980 mg (7/28/2021), 990 mg (8/11/2021), 990 mg (8/25/2021), 990 mg (9/8/2021), 990 mg (9/22/2021), 990 mg (10/6/2021), 990 mg (10/20/2021), 990 mg (11/3/2021), 990 mg (12/1/2021), 990 mg (12/15/2021), 990 mg (11/17/2021), 990 mg (12/28/2021)  fluorouraciL 2,400 mg/m2 = 5,950 mg in sodium chloride 0.9% 240 mL chemo infusion, 2,400 mg/m2 = 5,950 mg, Intravenous, Over 46 hours, 25 of 27 cycles  Administration: 5,950 mg (6/16/2021), 5,950 mg (6/30/2021), 5,950 mg (7/14/2021), 5,880 mg (7/28/2021), 5,930 mg (8/11/2021), 5,930 mg (8/25/2021), 5,930 mg (9/8/2021), 5,930 mg (9/22/2021), 5,930 mg (10/6/2021), 5,930 mg (10/20/2021), 5,930 mg (11/3/2021), 5,930 mg (12/1/2021), 5,930 mg (12/15/2021), 5,930 mg (11/17/2021), 5,930 mg (12/28/2021), 6,050 mg (5/11/2022), 6,025 mg (3/9/2022), 6,025 mg (2/23/2022), 5,930 mg (2/2/2022), 5,930 mg (1/19/2022), 6,050 mg (4/20/2022), 6,025 mg (4/6/2022), 6,025 mg (3/23/2022), 6,050 mg (6/1/2022), 6,050 mg (6/15/2022)  bevacizumab (AVASTIN) 600 mg in sodium chloride 0.9% 100 mL chemo infusion, 660 mg, Intravenous, [facility] 1 time, 23 of 25 cycles  Dose modification: 5 mg/kg (original dose 5 mg/kg, Cycle 26, Reason: MD Discretion, Comment: 5 mg/kg original dose)  Administration: 600  mg (6/30/2021), 600 mg (7/14/2021), 600 mg (7/28/2021), 600 mg (6/16/2021), 600 mg (8/11/2021), 655 mg (8/25/2021), 600 mg (9/8/2021), 600 mg (9/22/2021), 600 mg (10/6/2021), 600 mg (10/20/2021), 600 mg (11/3/2021), 655 mg (12/1/2021), 600 mg (12/15/2021), 600 mg (11/17/2021), 600 mg (12/28/2021), 600 mg (5/11/2022), 600 mg (3/9/2022), 600 mg (2/23/2022), 600 mg (2/2/2022), 600 mg (1/19/2022), 600 mg (4/20/2022), 680 mg (4/6/2022), 600 mg (3/23/2022)  irinotecan (CAMPTOSAR) 440 mg in sodium chloride 0.9% 500 mL chemo infusion, 446 mg, Intravenous, [facility] 1 time, 25 of 27 cycles  Dose modification: 180 mg/m2 (original dose 180 mg/m2, Cycle 24)  Administration: 440 mg (6/16/2021), 440 mg (6/30/2021), 440 mg (7/14/2021), 440 mg (7/28/2021), 440 mg (8/11/2021), 444 mg (8/25/2021), 440 mg (9/8/2021), 440 mg (9/22/2021), 440 mg (10/6/2021), 440 mg (10/20/2021), 440 mg (11/3/2021), 440 mg (12/1/2021), 440 mg (12/15/2021), 440 mg (11/17/2021), 440 mg (12/28/2021), 440 mg (5/11/2022), 440 mg (3/9/2022), 440 mg (2/23/2022), 440 mg (2/2/2022), 440 mg (1/19/2022), 440 mg (4/20/2022), 440 mg (4/6/2022), 440 mg (3/24/2022), 440 mg (6/1/2022), 440 mg (6/15/2022)       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   4/3/2020 Initial Diagnosis    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     5/6/2020 - 11/17/2020 Chemotherapy    Treatment Summary   Plan Name: OP FOLFOX 6 Q2W  Treatment Goal: Curative  Status: Inactive  Start Date: 5/6/2020  End Date: 11/6/2020  Provider: Dylan Leyva MD  Chemotherapy: fluorouraciL injection 945 mg, 400 mg/m2 = 945 mg, Intravenous, [facility] 1 time, 14 of 14 cycles  Administration: 945 mg (5/6/2020), 945 mg (5/20/2020), 945 mg (6/3/2020), 945 mg (6/17/2020), 945 mg (7/29/2020), 945 mg (8/12/2020), 945 mg (8/26/2020), 945 mg (9/9/2020), 945 mg (9/23/2020), 945 mg (10/6/2020), 945 mg (10/21/2020), 945 mg (11/4/2020)  fluorouraciL 2,400 mg/m2 = 5,665 mg in sodium chloride 0.9% 240 mL chemo infusion, 2,400  mg/m2 = 5,665 mg, Intravenous, Over 46 hours, 14 of 14 cycles  Administration: 5,665 mg (5/6/2020), 5,665 mg (5/20/2020), 5,665 mg (6/3/2020), 5,665 mg (6/17/2020), 5,665 mg (7/29/2020), 5,665 mg (8/12/2020), 5,665 mg (8/26/2020), 5,665 mg (9/9/2020), 5,665 mg (9/23/2020), 5,665 mg (10/6/2020), 5,665 mg (10/21/2020), 5,665 mg (11/4/2020)  leucovorin calcium 900 mg in dextrose 5 % 250 mL infusion, 945 mg, Intravenous, Clinic/[facility] 1 time, 14 of 14 cycles  Administration: 900 mg (5/6/2020), 900 mg (5/20/2020), 900 mg (6/3/2020), 900 mg (6/17/2020), 945 mg (6/30/2020), 945 mg (7/20/2020), 945 mg (7/29/2020), 945 mg (8/12/2020), 945 mg (8/26/2020), 945 mg (9/9/2020), 945 mg (9/23/2020), 945 mg (10/6/2020), 945 mg (10/21/2020), 945 mg (11/4/2020)  oxaliplatin (ELOXATIN) 200 mg in dextrose 5 % 500 mL chemo infusion, 201 mg, Intravenous, Clinic/[facility] 1 time, 6 of 6 cycles  Dose modification: 65 mg/m2 (original dose 85 mg/m2, Cycle 6)  Administration: 200 mg (5/6/2020), 200 mg (5/20/2020), 200 mg (6/3/2020), 200 mg (6/17/2020), 201 mg (6/30/2020), 150 mg (7/20/2020)       6/16/2021 -  Chemotherapy    Treatment Summary   Plan Name: OP COLORECTAL FOLFIRI + BEVACIZUMAB Q2W  Treatment Goal: Control  Status: Active  Start Date: 6/16/2021  End Date: 7/15/2022 (Planned)  Provider: Marah Santo MD  Chemotherapy: fluorouraciL injection 990 mg, 400 mg/m2 = 990 mg, Intravenous, [facility] 1 time, 15 of 15 cycles  Administration: 990 mg (6/16/2021), 990 mg (6/30/2021), 990 mg (7/14/2021), 980 mg (7/28/2021), 990 mg (8/11/2021), 990 mg (8/25/2021), 990 mg (9/8/2021), 990 mg (9/22/2021), 990 mg (10/6/2021), 990 mg (10/20/2021), 990 mg (11/3/2021), 990 mg (12/1/2021), 990 mg (12/15/2021), 990 mg (11/17/2021), 990 mg (12/28/2021)  fluorouraciL 2,400 mg/m2 = 5,950 mg in sodium chloride 0.9% 240 mL chemo infusion, 2,400 mg/m2 = 5,950 mg, Intravenous, Over 46 hours, 25 of 27 cycles  Administration: 5,950 mg (6/16/2021), 5,950 mg (6/30/2021),  5,950 mg (7/14/2021), 5,880 mg (7/28/2021), 5,930 mg (8/11/2021), 5,930 mg (8/25/2021), 5,930 mg (9/8/2021), 5,930 mg (9/22/2021), 5,930 mg (10/6/2021), 5,930 mg (10/20/2021), 5,930 mg (11/3/2021), 5,930 mg (12/1/2021), 5,930 mg (12/15/2021), 5,930 mg (11/17/2021), 5,930 mg (12/28/2021), 6,050 mg (5/11/2022), 6,025 mg (3/9/2022), 6,025 mg (2/23/2022), 5,930 mg (2/2/2022), 5,930 mg (1/19/2022), 6,050 mg (4/20/2022), 6,025 mg (4/6/2022), 6,025 mg (3/23/2022), 6,050 mg (6/1/2022), 6,050 mg (6/15/2022)  bevacizumab (AVASTIN) 600 mg in sodium chloride 0.9% 100 mL chemo infusion, 660 mg, Intravenous, [place] [id number] time, 23 of 25 cycles  Dose modification: 5 mg/kg (original dose 5 mg/kg, Cycle 26, Reason: MD Discretion, Comment: 5 mg/kg original dose)  Administration: 600 mg (6/30/2021), 600 mg (7/14/2021), 600 mg (7/28/2021), 600 mg (6/16/2021), 600 mg (8/11/2021), 655 mg (8/25/2021), 600 mg (9/8/2021), 600 mg (9/22/2021), 600 mg (10/6/2021), 600 mg (10/20/2021), 600 mg (11/3/2021), 655 mg (12/1/2021), 600 mg (12/15/2021), 600 mg (11/17/2021), 600 mg (12/28/2021), 600 mg (5/11/2022), 600 mg (3/9/2022), 600 mg (2/23/2022), 600 mg (2/2/2022), 600 mg (1/19/2022), 600 mg (4/20/2022), 680 mg (4/6/2022), 600 mg (3/23/2022)  irinotecan (CAMPTOSAR) 440 mg in sodium chloride 0.9% 500 mL chemo infusion, 446 mg, Intravenous, [facility] 1 time, 25 of 27 cycles  Dose modification: 180 mg/m2 (original dose 180 mg/m2, Cycle 24)  Administration: 440 mg (6/16/2021), 440 mg (6/30/2021), 440 mg (7/14/2021), 440 mg (7/28/2021), 440 mg (8/11/2021), 444 mg (8/25/2021), 440 mg (9/8/2021), 440 mg (9/22/2021), 440 mg (10/6/2021), 440 mg (10/20/2021), 440 mg (11/3/2021), 440 mg (12/1/2021), 440 mg (12/15/2021), 440 mg (11/17/2021), 440 mg (12/28/2021), 440 mg (5/11/2022), 440 mg (3/9/2022), 440 mg (2/23/2022), 440 mg (2/2/2022), 440 mg (1/19/2022), 440 mg (4/20/2022), 440 mg (4/6/2022), 440 mg (3/24/2022), 440 mg (6/1/2022), 440 mg (6/15/2022)            History:  Past Medical History:   Diagnosis Date    Anxiety     Depression     FH: ovarian cancer 3/16/2020    Hx of psychiatric care     Effexor, Paxil, Lexapro, Zoloft, Wellbutrin, Trazodone Buspar    Hyperthyroidism     Hypothyroid     Kidney calculi     Malignant neoplasm of sigmoid colon 3/16/2020    Menorrhagia     Multinodular goiter 2012    Palpitation     Psychiatric problem     Venous insufficiency       Past Surgical History:   Procedure Laterality Date     SECTION, CLASSIC      x3    COLONOSCOPY N/A 2020    Procedure: COLONOSCOPY;  Surgeon: Shane Parker MD;  Location: [facility];  Service: Endoscopy;  Laterality: N/A;    COLONOSCOPY N/A 2022    Procedure: COLONOSCOPY;  Surgeon: Shane Parker MD;  Location: [facility];  Service: Endoscopy;  Laterality: N/A;    CYSTOSCOPY W/ URETERAL STENT PLACEMENT Bilateral 3/25/2020    Procedure: CYSTOSCOPY, WITH URETERAL STENT INSERTION;  Surgeon: Claudio Tyson MD;  Location: [facility] OR [address] [place] [address];  Service: Urology;  Laterality: Bilateral;    INSERTION OF TUNNELED CENTRAL VENOUS CATHETER (CVC) WITH SUBCUTANEOUS PORT N/A 2020    Procedure: [id number];  Surgeon: Stephen Diagnostic Provider;  Location: [facility] OR [address] [place] [address];  Service: Radiology;  Laterality: N/A;  Room 189/Cindy    LAPAROSCOPIC SIGMOIDECTOMY N/A 3/25/2020    Procedure: COLECTOMY, SIGMOID, LAPAROSCOPIC, flex sig, ERAS high;  Surgeon: Silvio Man MD;  Location: [facility] OR [address] [place] [address];  Service: Colon and Rectal;  Laterality: N/A;    MOBILIZATION OF SPLENIC FLEXURE  3/25/2020    Procedure: MOBILIZATION, SPLENIC FLEXURE;  Surgeon: Silvio Man MD;  [facility];  Service: Colon and Rectal;;    tonsillectomy      TOTAL ABDOMINAL HYSTERECTOMY W/ BILATERAL SALPINGOOPHORECTOMY N/A 3/25/2020    Procedure: HYSTERECTOMY, TOTAL, ABDOMINAL, WITH BILATERAL SALPINGO-OOPHORECTOMY;  Surgeon: Keron Brady MD;  Location: [facility] OR [address] [place] [address];  Service:  Oncology;  Laterality: N/A;     Family History   Problem Relation Age of Onset    Heart disease Father     Diabetes Mother 65    Drug abuse Brother     Drug abuse Brother     Cancer Maternal Aunt         lung cancer    Cancer Maternal Grandmother         stomach cancer- started in ovaries    Ovarian cancer Maternal Grandmother         stomach cancer- started in ovaries    Colon cancer Paternal Uncle     Cancer Paternal Uncle     Ovarian cancer Maternal Aunt     Ovarian cancer Maternal Aunt     Ovarian cancer Cousin         mother had ovarian cancer    Drug abuse Son     Drug abuse Son         clean/sober since 2012    Colon cancer Son     No Known Problems Daughter     Uterine cancer Neg Hx     Breast cancer Neg Hx      Social History     Tobacco Use    Smoking status: Never    Smokeless tobacco: Never   Substance and Sexual Activity    Alcohol use: Yes     Comment: occasionally- twice monthly    Drug use: Never    Sexual activity: Yes     Partners: Male     Birth control/protection: See Surgical Hx        ROS:   Review of Systems   Constitutional:  Negative for fever, malaise/fatigue and weight loss.   HENT:  Negative for congestion, hearing loss, nosebleeds and sore throat.    Eyes:  Negative for double vision and photophobia.   Respiratory:  Negative for cough, hemoptysis, sputum production, shortness of breath and wheezing.    Cardiovascular:  Negative for chest pain, palpitations, orthopnea and leg swelling.   Gastrointestinal:  Negative for abdominal pain, blood in stool, constipation, diarrhea, heartburn, nausea and vomiting.   Genitourinary:  Negative for dysuria, frequency, hematuria and urgency.   Musculoskeletal:  Positive for back pain (intermittent 5-10 min, resolves by itself). Negative for joint pain and myalgias.   Skin:  Negative for itching and rash.   Neurological:  Negative for dizziness, tingling, seizures, weakness and headaches.   Endo/Heme/Allergies:  Negative for polydipsia. Does not  "bruise/bleed easily.   Psychiatric/Behavioral:  Negative for depression and memory loss. The patient is nervous/anxious. The patient does not have insomnia.       Objective:     Vitals:    10/03/22 0942   BP: (!) 140/82   Pulse: 81   Resp: 18   Temp: 97.7 °F (36.5 °C)   TempSrc: Temporal   SpO2: 98%   Weight: 132.7 kg (292 lb 8.8 oz)   Height: 5' 6" (1.676 m)   PainSc: 0-No pain       Wt Readings from Last 10 Encounters:   10/03/22 132.7 kg (292 lb 8.8 oz)   08/15/22 131.3 kg (289 lb 7.4 oz)   06/21/22 131.1 kg (289 lb)   06/17/22 131 kg (288 lb 12.8 oz)   06/15/22 134.5 kg (296 lb 8.3 oz)   06/15/22 134.5 kg (296 lb 8.3 oz)   06/13/22 131.5 kg (289 lb 14.5 oz)   06/06/22 132.5 kg (292 lb 1.8 oz)   06/06/22 132.5 kg (292 lb 1.8 oz)   06/01/22 135.9 kg (299 lb 9.7 oz)       Physical Examination:   Physical Exam  Vitals and nursing note reviewed.   Constitutional:       General: She is not in acute distress.     Appearance: She is not diaphoretic.   HENT:      Head: Normocephalic.      Mouth/Throat:      Pharynx: No oropharyngeal exudate.   Eyes:      General: No scleral icterus.     Conjunctiva/sclera: Conjunctivae normal.   Neck:      Thyroid: No thyromegaly.   Cardiovascular:      Rate and Rhythm: Normal rate and regular rhythm.      Heart sounds: Normal heart sounds. No murmur heard.  Pulmonary:      Effort: Pulmonary effort is normal. No respiratory distress.      Breath sounds: No stridor. No wheezing or rales.   Chest:      Chest wall: No tenderness.   Abdominal:      General: Bowel sounds are normal. There is no distension.      Palpations: Abdomen is soft. There is no mass.      Tenderness: There is no abdominal tenderness. There is no rebound.   Musculoskeletal:         General: No tenderness or deformity. Normal range of motion.      Cervical back: Neck supple.   Lymphadenopathy:      Cervical: No cervical adenopathy.   Skin:     General: Skin is warm and dry.      Findings: No erythema or rash. "   Neurological:      Mental Status: She is alert and oriented to person, place, and time.      Cranial Nerves: No cranial nerve deficit.      Coordination: Coordination normal.      Gait: Gait is intact.   Psychiatric:         Mood and Affect: Affect normal.         Cognition and Memory: Memory normal.         Judgment: Judgment normal.        Diagnostic Tests:  Significant Imaging: I have reviewed and interpreted all pertinent imaging results/findings.  Laboratory Data:  All pertinent labs have been reviewed.  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 Oxaliplatin was stopped due to severe adverse reaction.     Follow-up CTs and PET in May 2021 showed enlarging retroperitoneal node, concerning for metastatic disease.      She started FOLFIRI + Avastin and has continued till July 2022.   Given isolated RP toni disease, she has undergone open Left periaortic and aortocaval lymph node dissection on 7/12/2022.     Follow up scans show disease progression. I have discussed the case with Dr. Montelongo at [facility]. We discussed resuming FOLFIRI-lloyd vs transitioning to BRAF directed therapy (BEACON).   There may be an option ton enroll in SWOG 2107 (encorafenib/cetuximab +/- nivo) at [facility] if no prior BRAF inhibitor exposure.     Patient is interested in the trial and  therefore will resume FOLFIRI- Debo at this time and repeat scans in 8 weeks. By then, hope to enroll in the trial at [facility].       Transaminitis  Fluctuates.  Continue to monitor. No obvious liver mets.     Hypercalcemia   Mild, secondary to hyperparathyroidism.  Will see Endocrinology at the end of the month. Avoid calcium supplements.     Anxiety   Will schedule a follow up with Dr. Palm.     MDM includes  :    - Acute or chronic illness or injury that poses a threat to life or bodily function  - Independent review and explanation of 3+ results from unique tests  - Discussion of management and ordering 3+ unique tests  - Extensive discussion of treatment and management  - Prescription drug management  - Drug therapy requiring intensive monitoring for toxicity          ECOG SCORE    0 - Fully active-able to carry on all pre-disease performance without restriction           Discussion:   No follow-ups on file.    Plan was discussed with the patient at length, and she verbalized understanding. Gail was given an opportunity to ask questions that were answered to her satisfaction, and she was advised to call in the interval if any problems or questions arise.    Electronically signed by Marah Santo MD      Answers submitted by the patient for this visit:  Review of Systems Questionnaire (Submitted on 9/30/2022)  appetite change : No  unexpected weight change: No  mouth sores: No  visual disturbance: No  adenopathy: No    Route Chart for Scheduling    Med Onc Chart Routing      Follow up with physician . 10/17   Follow up with TAVO    Infusion scheduling note 10/5, 10/17   Injection scheduling note    Labs CBC and CMP   Lab interval: every 2 weeks  urinalysis today   Imaging    Pharmacy appointment    Other referrals        Treatment Plan Information   OP COLORECTAL FOLFIRI + BEVACIZUMAB Q2W   Marah Santo MD   Upcoming Treatment Dates - OP COLORECTAL FOLFIRI + BEVACIZUMAB Q2W    6/29/2022       Pre-Medications        LORazepam injection 1 mg       promethazine (PHENERGAN) 6.25 mg in dextrose 5 % 100 mL IVPB       palonosetron 0.25mg/dexamethasone 20mg in NS IVPB       Chemotherapy       irinotecan (CAMPTOSAR) 440 mg in sodium chloride 0.9% 500 mL chemo infusion       leucovorin calcium 400 mg/m2 = 1,010 mg in dextrose 5 % 250 mL infusion       fluorouracil (ADRUCIL) 2,400 mg/m2 = 6,050 mg in sodium chloride 0.9% 240 mL chemo infusion       bevacizumab (AVASTIN) 5 mg/kg = 680 mg in sodium chloride 0.9% 100 mL chemo infusion       Supportive Care       atropine injection 0.4 mg  7/13/2022       Pre-Medications       LORazepam injection 1 mg       promethazine (PHENERGAN) 6.25 mg in dextrose 5 % 100 mL IVPB       palonosetron 0.25mg/dexamethasone 20mg in NS IVPB       Chemotherapy       irinotecan (CAMPTOSAR) 440 mg in sodium chloride 0.9% 500 mL chemo infusion       leucovorin calcium 400 mg/m2 = 1,010 mg in dextrose 5 % 250 mL infusion       fluorouracil (ADRUCIL) 2,400 mg/m2 = 6,050 mg in sodium chloride 0.9% 240 mL chemo infusion       bevacizumab (AVASTIN) 5 mg/kg = 680 mg in sodium chloride 0.9% 100 mL chemo infusion       Supportive Care       atropine injection 0.4 mg    Supportive Plan Information  IV FLUIDS AND ELECTROLYTES   Brayden Solo MD   Upcoming Treatment Dates - IV FLUIDS AND ELECTROLYTES    No upcoming days in selected categories.    Therapy Plan Information  PORT FLUSH  Flushes  heparin, porcine (PF) 100 unit/mL injection flush 500 Units  500 Units, Intravenous, Every visit  sodium chloride 0.9% flush 10 mL  10 mL, Intravenous, Every visit

## 2022-10-03 NOTE — PROGRESS NOTES
The patient location is: [place]     Visit type: audiovisual    Face to Face time with patient: 14  14 minutes of total time spent on the encounter, which includes face to face time and non-face to face time preparing to see the patient (eg, review of tests), Obtaining and/or reviewing separately obtained history, Documenting clinical information in the electronic or other health record, Independently interpreting results (not separately reported) and communicating results to the patient/family/caregiver, or Care coordination (not separately reported).         Each patient to whom he or she provides medical services by telemedicine is:  (1) informed of the relationship between the physician and patient and the respective role of any other health care provider with respect to management of the patient; and (2) notified that he or she may decline to receive medical services by telemedicine and may withdraw from such care at any time.    Notes:         CHIEF COMPLAINT: Hypothyroidism  52 y.o.  being seen as a f/u. She has a history of Graves' disease which has been treated. Subsequently developed hypothyroidism. On synthroid 200 daily. In interim has had colon cancer. Recently went to [facility] to have abd LN removed. No XRT to head/neck. No difficulty swallowing. Had a PET CT showing increased metabolism on left thyroid nodule.   States that will be starting chemo at [facility]. Had surgery done in 2022 that showed LN + for Ca. Seeing Onc at [facility]. No difficulty swallowing.        Left Thyroid FNA 12- non diagnostic.             PAST MEDICAL HISTORY: Hypothyroidism after treatment for Graves' disease. 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 There is heart disease in the family     MEDICATIONS/ALLERGIES: The patient's MedCard has been updated and reviewed.     PE:       22 @ [facility]  TSH 3.7          ASSESSMENT/PLAN:  1. Hypothyroidism- . Continue current Tx- TSH within normal limits.  Symptomatically doing well.  Continue current treatment.     2. Multinodular Goiter- No XRT.  Recently had a PET-CT showing a hypermetabolic left thyroid nodule.  Max SUV 13.6.  She did have a biopsy in 2012, however was nondiagnostic.  Reviewed ultrasound imaging dating back to 2013 and is not changed in size in 10 years.  Discussed with the PET scan showing possible hypermetabolic nodule, it would be best FNA.  However, she is going to be starting chemotherapy for colon cancer and has multiple visits at this time.  Discussed if opted not to do an FNA, would at minimum recommend repeat ultrasound in 6 months.  She will discuss with her oncologists as well    3. Morbid Obesity-      FOLLOWUP  F/U 6 months with Thyroid Ultrasound, TSH

## 2022-10-05 ENCOUNTER — INFUSION (OUTPATIENT)
Dept: INFUSION THERAPY | Facility: HOSPITAL | Age: 52
End: 2022-10-05
Attending: INTERNAL MEDICINE
Payer: COMMERCIAL

## 2022-10-05 ENCOUNTER — DOCUMENTATION ONLY (OUTPATIENT)
Dept: INFUSION THERAPY | Facility: HOSPITAL | Age: 52
End: 2022-10-05

## 2022-10-05 VITALS
RESPIRATION RATE: 16 BRPM | BODY MASS INDEX: 47.02 KG/M2 | WEIGHT: 292.56 LBS | OXYGEN SATURATION: 97 % | HEIGHT: 66 IN | SYSTOLIC BLOOD PRESSURE: 170 MMHG | DIASTOLIC BLOOD PRESSURE: 79 MMHG | HEART RATE: 90 BPM

## 2022-10-05 DIAGNOSIS — I10 HYPERTENSION, UNSPECIFIED TYPE: ICD-10-CM

## 2022-10-05 DIAGNOSIS — C77.2 METASTASIS TO INTESTINAL LYMPH NODE: Primary | ICD-10-CM

## 2022-10-05 DIAGNOSIS — C18.7 MALIGNANT NEOPLASM OF SIGMOID COLON: ICD-10-CM

## 2022-10-05 PROCEDURE — 96375 TX/PRO/DX INJ NEW DRUG ADDON: CPT | Mod: PN

## 2022-10-05 PROCEDURE — 96367 TX/PROPH/DG ADDL SEQ IV INF: CPT | Mod: PN

## 2022-10-05 PROCEDURE — 96415 CHEMO IV INFUSION ADDL HR: CPT | Mod: PN

## 2022-10-05 PROCEDURE — 96368 THER/DIAG CONCURRENT INF: CPT | Mod: PN

## 2022-10-05 PROCEDURE — 63600175 PHARM REV CODE 636 W HCPCS: Mod: PN | Performed by: INTERNAL MEDICINE

## 2022-10-05 PROCEDURE — 25000003 PHARM REV CODE 250: Mod: PN | Performed by: INTERNAL MEDICINE

## 2022-10-05 PROCEDURE — 96416 CHEMO PROLONG INFUSE W/PUMP: CPT | Mod: PN

## 2022-10-05 PROCEDURE — 96417 CHEMO IV INFUS EACH ADDL SEQ: CPT | Mod: PN

## 2022-10-05 PROCEDURE — 96413 CHEMO IV INFUSION 1 HR: CPT | Mod: PN

## 2022-10-05 RX ORDER — ATROPINE SULFATE 0.4 MG/ML
0.4 INJECTION, SOLUTION ENDOTRACHEAL; INTRAMEDULLARY; INTRAMUSCULAR; INTRAVENOUS; SUBCUTANEOUS ONCE AS NEEDED
Status: COMPLETED | OUTPATIENT
Start: 2022-10-05 | End: 2022-10-05

## 2022-10-05 RX ORDER — LORAZEPAM 2 MG/ML
1 INJECTION INTRAMUSCULAR
Status: COMPLETED | OUTPATIENT
Start: 2022-10-05 | End: 2022-10-05

## 2022-10-05 RX ORDER — HYDRALAZINE HYDROCHLORIDE 20 MG/ML
10 INJECTION INTRAMUSCULAR; INTRAVENOUS ONCE
Status: DISCONTINUED | OUTPATIENT
Start: 2022-10-05 | End: 2022-10-05 | Stop reason: HOSPADM

## 2022-10-05 RX ORDER — SODIUM CHLORIDE 0.9 % (FLUSH) 0.9 %
10 SYRINGE (ML) INJECTION
Status: DISCONTINUED | OUTPATIENT
Start: 2022-10-05 | End: 2022-10-05 | Stop reason: HOSPADM

## 2022-10-05 RX ADMIN — LEUCOVORIN CALCIUM 1010 MG: 350 INJECTION, POWDER, LYOPHILIZED, FOR SOLUTION INTRAMUSCULAR; INTRAVENOUS at 12:10

## 2022-10-05 RX ADMIN — SODIUM CHLORIDE 440 MG: 0.9 INJECTION, SOLUTION INTRAVENOUS at 12:10

## 2022-10-05 RX ADMIN — FLUOROURACIL 6050 MG: 50 INJECTION, SOLUTION INTRAVENOUS at 02:10

## 2022-10-05 RX ADMIN — ATROPINE SULFATE 0.4 MG: 0.4 INJECTION, SOLUTION INTRAVENOUS at 12:10

## 2022-10-05 RX ADMIN — BEVACIZUMAB 680 MG: 400 INJECTION, SOLUTION INTRAVENOUS at 12:10

## 2022-10-05 RX ADMIN — LORAZEPAM 1 MG: 2 INJECTION INTRAMUSCULAR; INTRAVENOUS at 11:10

## 2022-10-05 RX ADMIN — PALONOSETRON 0.25 MG: 0.05 INJECTION, SOLUTION INTRAVENOUS at 11:10

## 2022-10-05 RX ADMIN — SODIUM CHLORIDE: 0.9 INJECTION, SOLUTION INTRAVENOUS at 10:10

## 2022-10-05 RX ADMIN — PROMETHAZINE HYDROCHLORIDE 6.25 MG: 25 INJECTION INTRAMUSCULAR; INTRAVENOUS at 11:10

## 2022-10-05 NOTE — PLAN OF CARE
Problem: Adult Inpatient Plan of Care  Goal: Plan of Care Review  Outcome: Ongoing, Progressing  Flowsheets (Taken 10/5/2022 1450)  Plan of Care Reviewed With: patient     Patient tolerated treatment. VSS. Port flushed with blood return. 5FU pump initiated at 5.2 ml/hr for the next 46 hours. Connections tightened and secured with coban. Biopatch and central line dressing in place to port. Patient instructed not to shower while port is accessed.   Patient will return on 10/7/22 for pump d/c.  AVS provided and reviewed. Patient ambulated per self upon discharge from infusion center.

## 2022-10-05 NOTE — PROGRESS NOTES
Oncology Nutrition   Chemotherapy Infusion Visit    Nutrition Follow Up   RD met w/ pt at chairside during infusion treatment.   Pt finished FOLFIRI + Avastin in July 2022. PET scan showed disease progression. Pt here today to restart FOLFIRI + Avastin. RD encouraged pt to increase fluid and protein intake. Pt states she remembers food safety recommendations. Pt denies any other nutrition related side effects. Pt weight has been stable.     Wt Readings from Last 10 Encounters:   10/05/22 132.7 kg (292 lb 8.8 oz)   10/03/22 132.7 kg (292 lb 8.8 oz)   08/15/22 131.3 kg (289 lb 7.4 oz)   06/21/22 131.1 kg (289 lb)   06/17/22 131 kg (288 lb 12.8 oz)   06/15/22 134.5 kg (296 lb 8.3 oz)   06/15/22 134.5 kg (296 lb 8.3 oz)   06/13/22 131.5 kg (289 lb 14.5 oz)   06/06/22 132.5 kg (292 lb 1.8 oz)   06/06/22 132.5 kg (292 lb 1.8 oz)       All other nutrition questions/concerns addressed as appropriate. Will continue to monitor prn throughout treatment.     Ciera Richardson, DAVIDN, LDN  10/05/2022  2:12 PM

## 2022-10-05 NOTE — PLAN OF CARE
Problem: Adult Inpatient Plan of Care  Goal: Patient-Specific Goal (Individualized)  Outcome: Ongoing, Progressing  Flowsheets (Taken 10/5/2022 1104)  Anxieties, Fears or Concerns:   anxious about restarting treatment   upset that cancer came back  Individualized Care Needs:   recliner, blanket, support, snack/drink, lights dimmed   oriented to unit  Patient-Specific Goals (Include Timeframe): no s/s rxn during txt     Problem: Fatigue  Goal: Improved Activity Tolerance  Intervention: Promote Improved Energy  Flowsheets (Taken 10/5/2022 1104)  Fatigue Management: frequent rest breaks encouraged  Sleep/Rest Enhancement:   natural light exposure provided   noise level reduced  Activity Management:   Ambulated -L4   Ambulated in reyes - L4

## 2022-10-06 ENCOUNTER — PATIENT MESSAGE (OUTPATIENT)
Dept: HEMATOLOGY/ONCOLOGY | Facility: CLINIC | Age: 52
End: 2022-10-06
Payer: COMMERCIAL

## 2022-10-06 ENCOUNTER — DOCUMENTATION ONLY (OUTPATIENT)
Dept: INFUSION THERAPY | Facility: HOSPITAL | Age: 52
End: 2022-10-06
Payer: COMMERCIAL

## 2022-10-06 NOTE — PROGRESS NOTES
Oncology   Chemotherapy Infusion Visit    Quick Social Service Status Follow Up     Late entry for 10/06/22    Met w/ pt briefly to follow up on social and emotional needs.  SW and Dr Pitt, psychology fellow, met with pt at chairside. Pt reported she has had her surgery and went back to work. She shared she has received bad news that cancer has been found in her lymph node. She said she found this out a few days ago and it has been very difficult to process. She said today is the first day she has not cried. SW validated her feelings and provided emotional support. Pt does see Dr Palm and SW encouraged her to keep her appointments with him to further discuss recent events. She said she would.   Pt said she will be doing treatment and maybe entering a trial at [facility]. She is worried she will have to miss work again. SW discussed possible financial resources if needed. Pt denied any further needs at this time.        Radha Giordano, MEDINA  10/06/2022  3:42 PM

## 2022-10-07 ENCOUNTER — TELEPHONE (OUTPATIENT)
Dept: INFUSION THERAPY | Facility: HOSPITAL | Age: 52
End: 2022-10-07

## 2022-10-07 ENCOUNTER — INFUSION (OUTPATIENT)
Dept: INFUSION THERAPY | Facility: HOSPITAL | Age: 52
End: 2022-10-07
Attending: INTERNAL MEDICINE
Payer: COMMERCIAL

## 2022-10-07 VITALS
TEMPERATURE: 98 F | DIASTOLIC BLOOD PRESSURE: 92 MMHG | SYSTOLIC BLOOD PRESSURE: 169 MMHG | OXYGEN SATURATION: 98 % | RESPIRATION RATE: 20 BRPM | HEART RATE: 72 BPM

## 2022-10-07 DIAGNOSIS — C77.2 METASTASIS TO INTESTINAL LYMPH NODE: Primary | ICD-10-CM

## 2022-10-07 DIAGNOSIS — C18.7 MALIGNANT NEOPLASM OF SIGMOID COLON: ICD-10-CM

## 2022-10-07 PROCEDURE — A4216 STERILE WATER/SALINE, 10 ML: HCPCS | Mod: PN | Performed by: INTERNAL MEDICINE

## 2022-10-07 PROCEDURE — 63600175 PHARM REV CODE 636 W HCPCS: Mod: PN | Performed by: INTERNAL MEDICINE

## 2022-10-07 PROCEDURE — 25000003 PHARM REV CODE 250: Mod: PN | Performed by: INTERNAL MEDICINE

## 2022-10-07 RX ORDER — HEPARIN 100 UNIT/ML
500 SYRINGE INTRAVENOUS
Status: DISCONTINUED | OUTPATIENT
Start: 2022-10-07 | End: 2022-10-07 | Stop reason: HOSPADM

## 2022-10-07 RX ORDER — SODIUM CHLORIDE 0.9 % (FLUSH) 0.9 %
10 SYRINGE (ML) INJECTION
Status: DISCONTINUED | OUTPATIENT
Start: 2022-10-07 | End: 2022-10-07 | Stop reason: HOSPADM

## 2022-10-07 RX ADMIN — Medication 500 UNITS: at 12:10

## 2022-10-07 RX ADMIN — Medication 10 ML: at 12:10

## 2022-10-07 NOTE — TELEPHONE ENCOUNTER
JULIANNE spoke with pt and updated her that SW sent a message to Kalani with [employer] today to follow up on rental assistance. Still waiting to hear back. JULIANNE will follow up with pt when new information if received.     Radha Giordano, JENNIEW

## 2022-10-07 NOTE — TELEPHONE ENCOUNTER
SW received a call from pt who was concerned because she heard from her landlord that she has not received the rental assistance from [employer] as planned. [facility] was sending $1000 toward the pt's rent and it has nor yet been received. Pt is worried.   SW with pt on the phone sent an email to Kalani with [employer] to check on the status of the check. Deloris responded she is out of the office but would have Fay look into this and get back with this SW.    JULIANNE will follow up with pt when update is received from [employer].    Radha Giordano, JENNIEW

## 2022-10-12 ENCOUNTER — TELEPHONE (OUTPATIENT)
Dept: INFUSION THERAPY | Facility: HOSPITAL | Age: 52
End: 2022-10-12
Payer: COMMERCIAL

## 2022-10-12 NOTE — TELEPHONE ENCOUNTER
SW received call from pt to follow up with [employer] regarding rental assistance. SW has left a message for Deloris with [employer].     JENNIE SamuelsW

## 2022-10-12 NOTE — TELEPHONE ENCOUNTER
JULIANNE spoke with pt and she confirmed her landlord has [facility] She was relieved. Pt reports she has been laid off from her job. She is worried about what will happen. SW provided her with link to [place]. [place]. She has signed up for food stamps already. JULIANNE instructed her to call Radha, financial counselor here and inform her of the change in her income. She will ask if she qualifies for other  financial assistance programs.     JULIANNE provided pt with emotional support. Will continue to explore resources.     Radha Giordano, JENNIEW  d

## 2022-10-17 ENCOUNTER — OFFICE VISIT (OUTPATIENT)
Dept: HEMATOLOGY/ONCOLOGY | Facility: CLINIC | Age: 52
End: 2022-10-17
Payer: COMMERCIAL

## 2022-10-17 ENCOUNTER — LAB VISIT (OUTPATIENT)
Dept: LAB | Facility: HOSPITAL | Age: 52
End: 2022-10-17
Attending: INTERNAL MEDICINE
Payer: COMMERCIAL

## 2022-10-17 VITALS
DIASTOLIC BLOOD PRESSURE: 90 MMHG | RESPIRATION RATE: 18 BRPM | SYSTOLIC BLOOD PRESSURE: 158 MMHG | BODY MASS INDEX: 47.09 KG/M2 | OXYGEN SATURATION: 97 % | HEART RATE: 92 BPM | WEIGHT: 293 LBS | HEIGHT: 66 IN | TEMPERATURE: 97 F

## 2022-10-17 DIAGNOSIS — C18.7 MALIGNANT NEOPLASM OF SIGMOID COLON: ICD-10-CM

## 2022-10-17 DIAGNOSIS — C77.9 SECONDARY ADENOCARCINOMA OF LYMPH NODE: ICD-10-CM

## 2022-10-17 DIAGNOSIS — F41.9 ANXIETY: ICD-10-CM

## 2022-10-17 DIAGNOSIS — C18.7 MALIGNANT NEOPLASM OF SIGMOID COLON: Primary | ICD-10-CM

## 2022-10-17 DIAGNOSIS — E03.8 OTHER SPECIFIED HYPOTHYROIDISM: Chronic | ICD-10-CM

## 2022-10-17 DIAGNOSIS — C77.2 METASTASIS TO INTESTINAL LYMPH NODE: ICD-10-CM

## 2022-10-17 DIAGNOSIS — I10 PRIMARY HYPERTENSION: ICD-10-CM

## 2022-10-17 DIAGNOSIS — E04.2 MULTINODULAR GOITER: ICD-10-CM

## 2022-10-17 LAB
ALBUMIN SERPL BCP-MCNC: 3.7 G/DL (ref 3.5–5.2)
ALP SERPL-CCNC: 183 U/L (ref 55–135)
ALT SERPL W/O P-5'-P-CCNC: 50 U/L (ref 10–44)
ANION GAP SERPL CALC-SCNC: 9 MMOL/L (ref 8–16)
AST SERPL-CCNC: 32 U/L (ref 10–40)
BASOPHILS # BLD AUTO: 0.03 K/UL (ref 0–0.2)
BASOPHILS NFR BLD: 0.5 % (ref 0–1.9)
BILIRUB SERPL-MCNC: 0.4 MG/DL (ref 0.1–1)
BUN SERPL-MCNC: 16 MG/DL (ref 6–20)
CALCIUM SERPL-MCNC: 10.4 MG/DL (ref 8.7–10.5)
CHLORIDE SERPL-SCNC: 108 MMOL/L (ref 95–110)
CO2 SERPL-SCNC: 23 MMOL/L (ref 23–29)
CREAT SERPL-MCNC: 0.8 MG/DL (ref 0.5–1.4)
DIFFERENTIAL METHOD: ABNORMAL
EOSINOPHIL # BLD AUTO: 0.3 K/UL (ref 0–0.5)
EOSINOPHIL NFR BLD: 5.6 % (ref 0–8)
ERYTHROCYTE [DISTWIDTH] IN BLOOD BY AUTOMATED COUNT: 14.7 % (ref 11.5–14.5)
EST. GFR  (NO RACE VARIABLE): >60 ML/MIN/1.73 M^2
GLUCOSE SERPL-MCNC: 118 MG/DL (ref 70–110)
HCT VFR BLD AUTO: 44.7 % (ref 37–48.5)
HGB BLD-MCNC: 14.2 G/DL (ref 12–16)
IMM GRANULOCYTES # BLD AUTO: 0.01 K/UL (ref 0–0.04)
IMM GRANULOCYTES NFR BLD AUTO: 0.2 % (ref 0–0.5)
LYMPHOCYTES # BLD AUTO: 1.7 K/UL (ref 1–4.8)
LYMPHOCYTES NFR BLD: 31 % (ref 18–48)
MCH RBC QN AUTO: 28.1 PG (ref 27–31)
MCHC RBC AUTO-ENTMCNC: 31.8 G/DL (ref 32–36)
MCV RBC AUTO: 88 FL (ref 82–98)
MONOCYTES # BLD AUTO: 0.5 K/UL (ref 0.3–1)
MONOCYTES NFR BLD: 9 % (ref 4–15)
NEUTROPHILS # BLD AUTO: 3 K/UL (ref 1.8–7.7)
NEUTROPHILS NFR BLD: 53.7 % (ref 38–73)
NRBC BLD-RTO: 0 /100 WBC
PLATELET # BLD AUTO: 291 K/UL (ref 150–450)
PMV BLD AUTO: 9.7 FL (ref 9.2–12.9)
POTASSIUM SERPL-SCNC: 4 MMOL/L (ref 3.5–5.1)
PROT SERPL-MCNC: 7.1 G/DL (ref 6–8.4)
RBC # BLD AUTO: 5.06 M/UL (ref 4–5.4)
SODIUM SERPL-SCNC: 140 MMOL/L (ref 136–145)
WBC # BLD AUTO: 5.55 K/UL (ref 3.9–12.7)

## 2022-10-17 PROCEDURE — 3077F PR MOST RECENT SYSTOLIC BLOOD PRESSURE >= 140 MM HG: ICD-10-PCS | Mod: CPTII,S$GLB,, | Performed by: INTERNAL MEDICINE

## 2022-10-17 PROCEDURE — 85025 COMPLETE CBC W/AUTO DIFF WBC: CPT | Mod: PN | Performed by: INTERNAL MEDICINE

## 2022-10-17 PROCEDURE — 3080F DIAST BP >= 90 MM HG: CPT | Mod: CPTII,S$GLB,, | Performed by: INTERNAL MEDICINE

## 2022-10-17 PROCEDURE — 99215 OFFICE O/P EST HI 40 MIN: CPT | Mod: S$GLB,,, | Performed by: INTERNAL MEDICINE

## 2022-10-17 PROCEDURE — 1159F MED LIST DOCD IN RCRD: CPT | Mod: CPTII,S$GLB,, | Performed by: INTERNAL MEDICINE

## 2022-10-17 PROCEDURE — 99215 PR OFFICE/OUTPT VISIT, EST, LEVL V, 40-54 MIN: ICD-10-PCS | Mod: S$GLB,,, | Performed by: INTERNAL MEDICINE

## 2022-10-17 PROCEDURE — 99999 PR PBB SHADOW E&M-EST. PATIENT-LVL IV: ICD-10-PCS | Mod: PBBFAC,,, | Performed by: INTERNAL MEDICINE

## 2022-10-17 PROCEDURE — 1159F PR MEDICATION LIST DOCUMENTED IN MEDICAL RECORD: ICD-10-PCS | Mod: CPTII,S$GLB,, | Performed by: INTERNAL MEDICINE

## 2022-10-17 PROCEDURE — 36415 COLL VENOUS BLD VENIPUNCTURE: CPT | Mod: PN | Performed by: INTERNAL MEDICINE

## 2022-10-17 PROCEDURE — 99999 PR PBB SHADOW E&M-EST. PATIENT-LVL IV: CPT | Mod: PBBFAC,,, | Performed by: INTERNAL MEDICINE

## 2022-10-17 PROCEDURE — 3077F SYST BP >= 140 MM HG: CPT | Mod: CPTII,S$GLB,, | Performed by: INTERNAL MEDICINE

## 2022-10-17 PROCEDURE — 80053 COMPREHEN METABOLIC PANEL: CPT | Mod: PN | Performed by: INTERNAL MEDICINE

## 2022-10-17 PROCEDURE — 3080F PR MOST RECENT DIASTOLIC BLOOD PRESSURE >= 90 MM HG: ICD-10-PCS | Mod: CPTII,S$GLB,, | Performed by: INTERNAL MEDICINE

## 2022-10-17 NOTE — PROGRESS NOTES
PROGRESS NOTE    Subjective:       Patient ID: Gail Mckinnon is a 52 y.o. female.  MRN: [id number]  : 1970    Chief Complaint: Malignant neoplasm of sigmoid colon      History of Present Illness:   Gail Mckinnon is a 52 y.o. female who presents with colon cancer, initially stage III and now with LN recurrence.       She completed adjuvant FOLFOX in 2020. She tolerated only 4 cycles of FOLFOX before she developed an infusion reaction to oxaliplatin in cycle 5 was well as cycle 6. She then completed 6 cycles of infusional 5 FU.     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 She was offered second line therapy with FOLFIRI and Avastin.      She presented to the ED on  with left flank pain. CT renal stone study showed Moderate hydronephrosis on the left secondary to 3 mm calculus at the [facility].    She had a PET scan mid April that showed possible progression of her disease with      She has been to [facility] for another opinion. No change was recommended in systemic therapy but she was offered surgical removal of the aorta caval nodes.  Recent sans at [facility]  show stable disease.      Surgery done 22. Received 2 more cycle of FOLFIRI without Avastin.     She had repeat imaging at [facility] on 22 that unfortunately showed disease progression since her surgery with multiple RP nodes and one mediastinal node.      Interim history:    She presents for a follow up visit. See prior notes for discussion. She has resumed FOLFIRI and Avastin and is here to obtain clearance for ongoing treatment.     She reports nausea and fatigue for the first few days.     She reports intermittent back pain. Has been having high blood pressure even at home and will reach out to her PMD.     Has recently lost her job, has insurance till the end of the month. Has been working with Radha the  for other options.          Oncology History:  Oncology History   Malignant neoplasm of sigmoid colon   3/16/2020 Initial Diagnosis    Malignant neoplasm of sigmoid colon     3/31/2020 Cancer Staged    Staging form: Colon and Rectum, AJCC 8th Edition  - Clinical stage from 3/31/2020: Stage IIIC (cT4b, cN2a, cM0)       5/6/2020 - 11/17/2020 Chemotherapy    Treatment Summary   Plan Name: OP FOLFOX 6 Q2W  Treatment Goal: Curative  Status: Inactive  Start Date: 5/6/2020  End Date: 11/6/2020  Provider: Dylan Leyva MD  Chemotherapy: fluorouraciL injection 945 mg, 400 mg/m2 = 945 mg, Intravenous, Clinic/H[facility] 1 time, 14 of 14 cycles  Administration: 945 mg (5/6/2020), 945 mg (5/20/2020), 945 mg (6/3/2020), 945 mg (6/17/2020), 945 mg (7/29/2020), 945 mg (8/12/2020), 945 mg (8/26/2020), 945 mg (9/9/2020), 945 mg (9/23/2020), 945 mg (10/6/2020), 945 mg (10/21/2020), 945 mg (11/4/2020)  fluorouraciL 2,400 mg/m2 = 5,665 mg in sodium chloride 0.9% 240 mL chemo infusion, 2,400 mg/m2 = 5,665 mg, Intravenous, Over 46 hours, 14 of 14 cycles  Administration: 5,665 mg (5/6/2020), 5,665 mg (5/20/2020), 5,665 mg (6/3/2020), 5,665 mg (6/17/2020), 5,665 mg (7/29/2020), 5,665 mg (8/12/2020), 5,665 mg (8/26/2020), 5,665 mg (9/9/2020), 5,665 mg (9/23/2020), 5,665 mg (10/6/2020), 5,665 mg (10/21/2020), 5,665 mg (11/4/2020)  leucovorin calcium 900 mg in dextrose 5 % 250 mL infusion, 945 mg, Intravenous, Clinic/H[facility] 1 time, 14 of 14 cycles  Administration: 900 mg (5/6/2020), 900 mg (5/20/2020), 900 mg (6/3/2020), 900 mg (6/17/2020), 945 mg (6/30/2020), 945 mg (7/20/2020), 945 mg (7/29/2020), 945 mg (8/12/2020), 945 mg (8/26/2020), 945 mg (9/9/2020), 945 mg (9/23/2020), 945 mg (10/6/2020), 945 mg (10/21/2020), 945 mg (11/4/2020)  oxaliplatin (ELOXATIN) 200 mg in dextrose 5 % 500 mL chemo infusion, 201 mg, Intravenous, Clinic/[facility] 1 time, 6 of 6 cycles  Dose modification: 65 mg/m2 (original dose 85 mg/m2, Cycle 6)  Administration: 200 mg (5/6/2020), 200 mg  (5/20/2020), 200 mg (6/3/2020), 200 mg (6/17/2020), 201 mg (6/30/2020), 150 mg (7/20/2020)       6/16/2021 -  Chemotherapy    Treatment Summary   Plan Name: OP COLORECTAL FOLFIRI + BEVACIZUMAB Q2W  Treatment Goal: Control  Status: Active  Start Date: 6/16/2021  End Date: 10/21/2022 (Planned)  Provider: Marah Santo MD  Chemotherapy: fluorouraciL injection 990 mg, 400 mg/m2 = 990 mg, Intravenous, Clinic/[facility] 1 time, 15 of 15 cycles  Administration: 990 mg (6/16/2021), 990 mg (6/30/2021), 990 mg (7/14/2021), 980 mg (7/28/2021), 990 mg (8/11/2021), 990 mg (8/25/2021), 990 mg (9/8/2021), 990 mg (9/22/2021), 990 mg (10/6/2021), 990 mg (10/20/2021), 990 mg (11/3/2021), 990 mg (12/1/2021), 990 mg (12/15/2021), 990 mg (11/17/2021), 990 mg (12/28/2021)  fluorouraciL 2,400 mg/m2 = 5,950 mg in sodium chloride 0.9% 240 mL chemo infusion, 2,400 mg/m2 = 5,950 mg, Intravenous, Over 46 hours, 26 of 27 cycles  Administration: 5,950 mg (6/16/2021), 5,950 mg (6/30/2021), 5,950 mg (7/14/2021), 5,880 mg (7/28/2021), 5,930 mg (8/11/2021), 5,930 mg (8/25/2021), 5,930 mg (9/8/2021), 5,930 mg (9/22/2021), 5,930 mg (10/6/2021), 5,930 mg (10/20/2021), 5,930 mg (11/3/2021), 5,930 mg (12/1/2021), 5,930 mg (12/15/2021), 5,930 mg (11/17/2021), 5,930 mg (12/28/2021), 6,050 mg (5/11/2022), 6,025 mg (3/9/2022), 6,025 mg (2/23/2022), 5,930 mg (2/2/2022), 5,930 mg (1/19/2022), 6,050 mg (4/20/2022), 6,025 mg (4/6/2022), 6,025 mg (3/23/2022), 6,050 mg (6/1/2022), 6,050 mg (6/15/2022), 6,050 mg (10/5/2022)  bevacizumab (AVASTIN) 600 mg in sodium chloride 0.9% 100 mL chemo infusion, 660 mg, Intravenous, [facility] 1 time, 24 of 25 cycles  Dose modification: 5 mg/kg (original dose 5 mg/kg, Cycle 26, Reason: MD Discretion, Comment: 5 mg/kg original dose)  Administration: 600 mg (6/30/2021), 600 mg (7/14/2021), 600 mg (7/28/2021), 600 mg (6/16/2021), 600 mg (8/11/2021), 655 mg (8/25/2021), 600 mg (9/8/2021), 600 mg (9/22/2021), 600 mg (10/6/2021), 600 mg  (10/20/2021), 600 mg (11/3/2021), 655 mg (12/1/2021), 600 mg (12/15/2021), 600 mg (11/17/2021), 600 mg (12/28/2021), 600 mg (5/11/2022), 600 mg (3/9/2022), 600 mg (2/23/2022), 600 mg (2/2/2022), 600 mg (1/19/2022), 600 mg (4/20/2022), 680 mg (4/6/2022), 600 mg (3/23/2022), 680 mg (10/5/2022)  irinotecan (CAMPTOSAR) 440 mg in sodium chloride 0.9% 500 mL chemo infusion, 446 mg, Intravenous, [facility] 1 time, 26 of 27 cycles  Dose modification: 180 mg/m2 (original dose 180 mg/m2, Cycle 24)  Administration: 440 mg (6/16/2021), 440 mg (6/30/2021), 440 mg (7/14/2021), 440 mg (7/28/2021), 440 mg (8/11/2021), 444 mg (8/25/2021), 440 mg (9/8/2021), 440 mg (9/22/2021), 440 mg (10/6/2021), 440 mg (10/20/2021), 440 mg (11/3/2021), 440 mg (12/1/2021), 440 mg (12/15/2021), 440 mg (11/17/2021), 440 mg (12/28/2021), 440 mg (5/11/2022), 440 mg (3/9/2022), 440 mg (2/23/2022), 440 mg (2/2/2022), 440 mg (1/19/2022), 440 mg (4/20/2022), 440 mg (4/6/2022), 440 mg (3/24/2022), 440 mg (6/1/2022), 440 mg (6/15/2022), 440 mg (10/5/2022)       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   4/3/2020 Initial Diagnosis    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     5/6/2020 - 11/17/2020 Chemotherapy    Treatment Summary   Plan Name: OP FOLFOX 6 Q2W  Treatment Goal: Curative  Status: Inactive  Start Date: 5/6/2020  End Date: 11/6/2020  Provider: Dylan Leyva MD  Chemotherapy: fluorouraciL injection 945 mg, 400 mg/m2 = 945 mg, Intravenous, Clinic/[facility] 1 time, 14 of 14 cycles  Administration: 945 mg (5/6/2020), 945 mg (5/20/2020), 945 mg (6/3/2020), 945 mg (6/17/2020), 945 mg (7/29/2020), 945 mg (8/12/2020), 945 mg (8/26/2020), 945 mg (9/9/2020), 945 mg (9/23/2020), 945 mg (10/6/2020), 945 mg (10/21/2020), 945 mg (11/4/2020)  fluorouraciL 2,400 mg/m2 = 5,665 mg in sodium chloride 0.9% 240 mL chemo infusion, 2,400 mg/m2 = 5,665 mg, Intravenous, Over 46 hours, 14 of 14 cycles  Administration: 5,665 mg (5/6/2020), 5,665 mg (5/20/2020), 5,665 mg (6/3/2020),  5,665 mg (6/17/2020), 5,665 mg (7/29/2020), 5,665 mg (8/12/2020), 5,665 mg (8/26/2020), 5,665 mg (9/9/2020), 5,665 mg (9/23/2020), 5,665 mg (10/6/2020), 5,665 mg (10/21/2020), 5,665 mg (11/4/2020)  leucovorin calcium 900 mg in dextrose 5 % 250 mL infusion, 945 mg, Intravenous, Clinic/[facility] 1 time, 14 of 14 cycles  Administration: 900 mg (5/6/2020), 900 mg (5/20/2020), 900 mg (6/3/2020), 900 mg (6/17/2020), 945 mg (6/30/2020), 945 mg (7/20/2020), 945 mg (7/29/2020), 945 mg (8/12/2020), 945 mg (8/26/2020), 945 mg (9/9/2020), 945 mg (9/23/2020), 945 mg (10/6/2020), 945 mg (10/21/2020), 945 mg (11/4/2020)  oxaliplatin (ELOXATIN) 200 mg in dextrose 5 % 500 mL chemo infusion, 201 mg, Intravenous, Clinic/[facility] 1 time, 6 of 6 cycles  Dose modification: 65 mg/m2 (original dose 85 mg/m2, Cycle 6)  Administration: 200 mg (5/6/2020), 200 mg (5/20/2020), 200 mg (6/3/2020), 200 mg (6/17/2020), 201 mg (6/30/2020), 150 mg (7/20/2020)       6/16/2021 -  Chemotherapy    Treatment Summary   Plan Name: OP COLORECTAL FOLFIRI + BEVACIZUMAB Q2W  Treatment Goal: Control  Status: Active  Start Date: 6/16/2021  End Date: 10/21/2022 (Planned)  Provider: Marah Santo MD  Chemotherapy: fluorouraciL injection 990 mg, 400 mg/m2 = 990 mg, Intravenous, Clinic/[facility] 1 time, 15 of 15 cycles  Administration: 990 mg (6/16/2021), 990 mg (6/30/2021), 990 mg (7/14/2021), 980 mg (7/28/2021), 990 mg (8/11/2021), 990 mg (8/25/2021), 990 mg (9/8/2021), 990 mg (9/22/2021), 990 mg (10/6/2021), 990 mg (10/20/2021), 990 mg (11/3/2021), 990 mg (12/1/2021), 990 mg (12/15/2021), 990 mg (11/17/2021), 990 mg (12/28/2021)  fluorouraciL 2,400 mg/m2 = 5,950 mg in sodium chloride 0.9% 240 mL chemo infusion, 2,400 mg/m2 = 5,950 mg, Intravenous, Over 46 hours, 26 of 27 cycles  Administration: 5,950 mg (6/16/2021), 5,950 mg (6/30/2021), 5,950 mg (7/14/2021), 5,880 mg (7/28/2021), 5,930 mg (8/11/2021), 5,930 mg (8/25/2021), 5,930 mg (9/8/2021), 5,930 mg (9/22/2021), 5,930 mg  (10/6/2021), 5,930 mg (10/20/2021), 5,930 mg (11/3/2021), 5,930 mg (12/1/2021), 5,930 mg (12/15/2021), 5,930 mg (11/17/2021), 5,930 mg (12/28/2021), 6,050 mg (5/11/2022), 6,025 mg (3/9/2022), 6,025 mg (2/23/2022), 5,930 mg (2/2/2022), 5,930 mg (1/19/2022), 6,050 mg (4/20/2022), 6,025 mg (4/6/2022), 6,025 mg (3/23/2022), 6,050 mg (6/1/2022), 6,050 mg (6/15/2022), 6,050 mg (10/5/2022)  bevacizumab (AVASTIN) 600 mg in sodium chloride 0.9% 100 mL chemo infusion, 660 mg, Intravenous, [facility] 1 time, 24 of 25 cycles  Dose modification: 5 mg/kg (original dose 5 mg/kg, Cycle 26, Reason: MD Discretion, Comment: 5 mg/kg original dose)  Administration: 600 mg (6/30/2021), 600 mg (7/14/2021), 600 mg (7/28/2021), 600 mg (6/16/2021), 600 mg (8/11/2021), 655 mg (8/25/2021), 600 mg (9/8/2021), 600 mg (9/22/2021), 600 mg (10/6/2021), 600 mg (10/20/2021), 600 mg (11/3/2021), 655 mg (12/1/2021), 600 mg (12/15/2021), 600 mg (11/17/2021), 600 mg (12/28/2021), 600 mg (5/11/2022), 600 mg (3/9/2022), 600 mg (2/23/2022), 600 mg (2/2/2022), 600 mg (1/19/2022), 600 mg (4/20/2022), 680 mg (4/6/2022), 600 mg (3/23/2022), 680 mg (10/5/2022)  irinotecan (CAMPTOSAR) 440 mg in sodium chloride 0.9% 500 mL chemo infusion, 446 mg, Intravenous, [facility] 1 time, 26 of 27 cycles  Dose modification: 180 mg/m2 (original dose 180 mg/m2, Cycle 24)  Administration: 440 mg (6/16/2021), 440 mg (6/30/2021), 440 mg (7/14/2021), 440 mg (7/28/2021), 440 mg (8/11/2021), 444 mg (8/25/2021), 440 mg (9/8/2021), 440 mg (9/22/2021), 440 mg (10/6/2021), 440 mg (10/20/2021), 440 mg (11/3/2021), 440 mg (12/1/2021), 440 mg (12/15/2021), 440 mg (11/17/2021), 440 mg (12/28/2021), 440 mg (5/11/2022), 440 mg (3/9/2022), 440 mg (2/23/2022), 440 mg (2/2/2022), 440 mg (1/19/2022), 440 mg (4/20/2022), 440 mg (4/6/2022), 440 mg (3/24/2022), 440 mg (6/1/2022), 440 mg (6/15/2022), 440 mg (10/5/2022)           History:  Past Medical History:   Diagnosis Date    Anxiety      Depression     FH: ovarian cancer 3/16/2020    Hx of psychiatric care     Effexor, Paxil, Lexapro, Zoloft, Wellbutrin, Trazodone Buspar    Hyperthyroidism     Hypothyroid     Kidney calculi     Malignant neoplasm of sigmoid colon 3/16/2020    Menorrhagia     Multinodular goiter 2012    Palpitation     Psychiatric problem     Venous insufficiency       Past Surgical History:   Procedure Laterality Date     SECTION, CLASSIC      x3    COLONOSCOPY N/A 2020    Procedure: COLONOSCOPY;  Surgeon: Shane Parker MD;  Location: [facility] ENDO;  Service: Endoscopy;  Laterality: N/A;    COLONOSCOPY N/A 2022    Procedure: COLONOSCOPY;  Surgeon: Shane Parker MD;  Location: [facility] ENDO;  Service: Endoscopy;  Laterality: N/A;    CYSTOSCOPY W/ URETERAL STENT PLACEMENT Bilateral 3/25/2020    Procedure: CYSTOSCOPY, WITH URETERAL STENT INSERTION;  Surgeon: Claudio Tyson MD;  Location: [facility] OR [address] [place] [address];  Service: Urology;  Laterality: Bilateral;    INSERTION OF TUNNELED CENTRAL VENOUS CATHETER (CVC) WITH SUBCUTANEOUS PORT N/A 2020    Procedure: [id number];  Surgeon: [facility] Diagnostic Provider;  Location: [facility] OR [address] [place] [address];  Service: Radiology;  Laterality: N/A;  Room 189/Cindy    LAPAROSCOPIC SIGMOIDECTOMY N/A 3/25/2020    Procedure: COLECTOMY, SIGMOID, LAPAROSCOPIC, flex sig, ERAS high;  Surgeon: Silvio Man MD;  Location: [facility] OR [address] [place] [address];  Service: Colon and Rectal;  Laterality: N/A;    MOBILIZATION OF SPLENIC FLEXURE  3/25/2020    Procedure: MOBILIZATION, SPLENIC FLEXURE;  Surgeon: Silvio Man MD;  [facility];  Service: Colon and Rectal;;    tonsillectomy      TOTAL ABDOMINAL HYSTERECTOMY W/ BILATERAL SALPINGOOPHORECTOMY N/A 3/25/2020    Procedure: HYSTERECTOMY, TOTAL, ABDOMINAL, WITH BILATERAL SALPINGO-OOPHORECTOMY;  Surgeon: Keron Brady MD;  Location: [facility] OR [address] [place] [address];  Service: Oncology;  Laterality: N/A;     Family History   Problem Relation Age of  Onset    Heart disease Father     Diabetes Mother 65    Drug abuse Brother     Drug abuse Brother     Cancer Maternal Aunt         lung cancer    Cancer Maternal Grandmother         stomach cancer- started in ovaries    Ovarian cancer Maternal Grandmother         stomach cancer- started in ovaries    Colon cancer Paternal Uncle     Cancer Paternal Uncle     Ovarian cancer Maternal Aunt     Ovarian cancer Maternal Aunt     Ovarian cancer Cousin         mother had ovarian cancer    Drug abuse Son     Drug abuse Son         clean/sober since 2012    Colon cancer Son     No Known Problems Daughter     Uterine cancer Neg Hx     Breast cancer Neg Hx      Social History     Tobacco Use    Smoking status: Never    Smokeless tobacco: Never   Substance and Sexual Activity    Alcohol use: Yes     Comment: occasionally- twice monthly    Drug use: Never    Sexual activity: Yes     Partners: Male     Birth control/protection: See Surgical Hx        ROS:   Review of Systems   Constitutional:  Positive for malaise/fatigue (first few days). Negative for fever and weight loss.   HENT:  Negative for congestion, hearing loss, nosebleeds and sore throat.    Eyes:  Negative for double vision and photophobia.   Respiratory:  Negative for cough, hemoptysis, sputum production, shortness of breath and wheezing.    Cardiovascular:  Negative for chest pain, palpitations, orthopnea and leg swelling.   Gastrointestinal:  Positive for constipation (brief) and nausea. Negative for abdominal pain, blood in stool, diarrhea, heartburn and vomiting.   Genitourinary:  Negative for dysuria, frequency, hematuria and urgency.   Musculoskeletal:  Positive for back pain (seeing chiropractor). Negative for joint pain and myalgias.   Skin:  Negative for itching and rash.   Neurological:  Negative for dizziness, tingling, seizures, weakness and headaches.   Endo/Heme/Allergies:  Negative for polydipsia. Does not bruise/bleed easily.   Psychiatric/Behavioral:   "Negative for depression and memory loss. The patient is nervous/anxious. The patient does not have insomnia.       Objective:     Vitals:    10/17/22 0909   BP: (!) 158/90   Pulse: 92   Resp: 18   Temp: 97 °F (36.1 °C)   TempSrc: Temporal   SpO2: 97%   Weight: 133.3 kg (293 lb 14 oz)   Height: 5' 6" (1.676 m)   PainSc: 0-No pain         Wt Readings from Last 10 Encounters:   10/17/22 133.3 kg (293 lb 14 oz)   10/05/22 132.7 kg (292 lb 8.8 oz)   10/03/22 132.7 kg (292 lb 8.8 oz)   08/15/22 131.3 kg (289 lb 7.4 oz)   06/21/22 131.1 kg (289 lb)   06/17/22 131 kg (288 lb 12.8 oz)   06/15/22 134.5 kg (296 lb 8.3 oz)   06/15/22 134.5 kg (296 lb 8.3 oz)   06/13/22 131.5 kg (289 lb 14.5 oz)   06/06/22 132.5 kg (292 lb 1.8 oz)       Physical Examination:   Physical Exam  Vitals and nursing note reviewed.   Constitutional:       General: She is not in acute distress.     Appearance: She is not diaphoretic.   HENT:      Head: Normocephalic.      Mouth/Throat:      Pharynx: No oropharyngeal exudate.   Eyes:      General: No scleral icterus.     Conjunctiva/sclera: Conjunctivae normal.   Neck:      Thyroid: No thyromegaly.   Cardiovascular:      Rate and Rhythm: Normal rate and regular rhythm.      Heart sounds: Normal heart sounds. No murmur heard.  Pulmonary:      Effort: Pulmonary effort is normal. No respiratory distress.      Breath sounds: No stridor. No wheezing or rales.   Chest:      Chest wall: No tenderness.   Abdominal:      General: Bowel sounds are normal. There is no distension.      Palpations: Abdomen is soft. There is no mass.      Tenderness: There is no abdominal tenderness. There is no rebound.   Musculoskeletal:         General: No tenderness or deformity. Normal range of motion.      Cervical back: Neck supple.   Lymphadenopathy:      Cervical: No cervical adenopathy.   Skin:     General: Skin is warm and dry.      Findings: No erythema or rash.   Neurological:      Mental Status: She is alert and oriented " to person, place, and time.      Cranial Nerves: No cranial nerve deficit.      Coordination: Coordination normal.      Gait: Gait is intact.   Psychiatric:         Mood and Affect: Affect normal.         Cognition and Memory: Memory normal.         Judgment: Judgment normal.        Diagnostic Tests:  Significant Imaging: I have reviewed and interpreted all pertinent imaging results/findings.  Laboratory Data:  All pertinent labs have been reviewed.  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 Oxaliplatin was stopped due to severe adverse reaction.     Follow-up CTs and PET in May 2021 showed enlarging retroperitoneal node, concerning for metastatic disease.      She started FOLFIRI + Avastin and has continued till July 2022.   Given isolated RP toni disease, she has undergone open Left periaortic and aortocaval lymph node dissection on 7/12/2022.     Follow up scans show disease progression. I have discussed the case with Dr. Montelongo at [facility]. We discussed resuming FOLFIRI-debo vs transitioning to BRAF directed therapy (BEACON).   There may be an option ton enroll in SWOG 2107 (encorafenib/cetuximab +/- nivo) at [facility] if no prior BRAF inhibitor exposure.     Patient is interested in the trial and therefore will resume FOLFIRI- Debo at this time and repeat scans in 8  weeks. By then, hope to enroll in the trial at [facility]. Cleared to receive treatment on 10/19/22. RTC 2 weeks.       Transaminitis  Fluctuates.  Continue to monitor. No obvious liver mets.     Hypercalcemia   Mild, secondary to hyperparathyroidism.  Avoid calcium supplements.     Hypothyroidism  Multinodular goiter   Continue Levothyroxine. Endocrine consult is appreciated.   Had a non diagnostic biopsy in 2012, usg findings have remained stable. However, given uptake on PET this year, an FNA vs repeat usg in 6 months is recommended. Will follow closely.     Essential HTN   Will reach out to her PMD to initiate treatment.Monitor closely while on Avastin.     Anxiety   Continue to  follow up with Dr. Palm.     MDM includes  :    - Acute or chronic illness or injury that poses a threat to life or bodily function  - Independent review and explanation of 3+ results from unique tests  - Discussion of management and ordering 3+ unique tests  - Extensive discussion of treatment and management  - Prescription drug management  - Drug therapy requiring intensive monitoring for toxicity          ECOG SCORE    1 - Restricted in strenuous activity-ambulatory and able to carry out work of a light nature           Discussion:   No follow-ups on file.    Plan was discussed with the patient at length, and she verbalized understanding. Gail was given an opportunity to ask questions that were answered to her satisfaction, and she was advised to call in the interval if any problems or questions arise.    Electronically signed by Marah Santo MD    Answers submitted by the patient for this visit:  Review of Systems Questionnaire (Submitted on 10/14/2022)  appetite change : No  unexpected weight change: No  mouth sores: No  visual disturbance: No  adenopathy: No      Route Chart for Scheduling    Med Onc Chart Routing      Follow up with physician . 11/14   Follow up with TAVO . 10/31   Infusion scheduling note    Injection scheduling  note    Labs CBC, CMP and magnesium   Lab interval:     Imaging    Pharmacy appointment    Other referrals        Treatment Plan Information   OP COLORECTAL FOLFIRI + BEVACIZUMAB Q2W   Marah Santo MD   Upcoming Treatment Dates - OP COLORECTAL FOLFIRI + BEVACIZUMAB Q2W    10/19/2022       Pre-Medications       LORazepam injection 1 mg       promethazine (PHENERGAN) 6.25 mg in dextrose 5 % 100 mL IVPB       palonosetron 0.25mg/dexamethasone 20mg in NS IVPB       Chemotherapy       irinotecan (CAMPTOSAR) 440 mg in sodium chloride 0.9% 500 mL chemo infusion       leucovorin calcium 400 mg/m2 = 1,010 mg in dextrose 5 % 250 mL infusion       fluorouracil (ADRUCIL) 2,400 mg/m2 = 6,050 mg in sodium chloride 0.9% 240 mL chemo infusion       bevacizumab (AVASTIN) 5 mg/kg = 680 mg in sodium chloride 0.9% 100 mL chemo infusion       Supportive Care       atropine injection 0.4 mg    Supportive Plan Information  IV FLUIDS AND ELECTROLYTES   Brayden Solo MD   Upcoming Treatment Dates - IV FLUIDS AND ELECTROLYTES    No upcoming days in selected categories.    Therapy Plan Information  PORT FLUSH  Flushes  heparin, porcine (PF) 100 unit/mL injection flush 500 Units  500 Units, Intravenous, Every visit  sodium chloride 0.9% flush 10 mL  10 mL, Intravenous, Every visit

## 2022-10-18 DIAGNOSIS — R11.0 NAUSEA: ICD-10-CM

## 2022-10-18 DIAGNOSIS — F32.A DEPRESSION, UNSPECIFIED DEPRESSION TYPE: ICD-10-CM

## 2022-10-18 RX ORDER — HEPARIN 100 UNIT/ML
500 SYRINGE INTRAVENOUS
Status: CANCELLED | OUTPATIENT
Start: 2022-10-21

## 2022-10-18 RX ORDER — LORAZEPAM 2 MG/ML
1 INJECTION INTRAMUSCULAR
Status: CANCELLED | OUTPATIENT
Start: 2022-10-19 | End: 2022-10-19

## 2022-10-18 RX ORDER — SODIUM CHLORIDE 0.9 % (FLUSH) 0.9 %
10 SYRINGE (ML) INJECTION
Status: CANCELLED | OUTPATIENT
Start: 2022-10-19

## 2022-10-18 RX ORDER — HEPARIN 100 UNIT/ML
500 SYRINGE INTRAVENOUS
Status: CANCELLED | OUTPATIENT
Start: 2022-10-19

## 2022-10-18 RX ORDER — ATROPINE SULFATE 0.4 MG/ML
0.4 INJECTION, SOLUTION ENDOTRACHEAL; INTRAMEDULLARY; INTRAMUSCULAR; INTRAVENOUS; SUBCUTANEOUS ONCE AS NEEDED
Status: CANCELLED | OUTPATIENT
Start: 2022-10-19

## 2022-10-18 RX ORDER — SODIUM CHLORIDE 0.9 % (FLUSH) 0.9 %
10 SYRINGE (ML) INJECTION
Status: CANCELLED | OUTPATIENT
Start: 2022-10-21

## 2022-10-19 ENCOUNTER — OFFICE VISIT (OUTPATIENT)
Dept: PSYCHIATRY | Facility: CLINIC | Age: 52
End: 2022-10-19
Payer: COMMERCIAL

## 2022-10-19 ENCOUNTER — INFUSION (OUTPATIENT)
Dept: INFUSION THERAPY | Facility: HOSPITAL | Age: 52
End: 2022-10-19
Attending: INTERNAL MEDICINE
Payer: COMMERCIAL

## 2022-10-19 ENCOUNTER — DOCUMENTATION ONLY (OUTPATIENT)
Dept: INFUSION THERAPY | Facility: HOSPITAL | Age: 52
End: 2022-10-19

## 2022-10-19 VITALS
HEIGHT: 66 IN | OXYGEN SATURATION: 96 % | RESPIRATION RATE: 17 BRPM | HEART RATE: 92 BPM | SYSTOLIC BLOOD PRESSURE: 166 MMHG | DIASTOLIC BLOOD PRESSURE: 77 MMHG | WEIGHT: 293 LBS | BODY MASS INDEX: 47.09 KG/M2

## 2022-10-19 DIAGNOSIS — C18.7 MALIGNANT NEOPLASM OF SIGMOID COLON: ICD-10-CM

## 2022-10-19 DIAGNOSIS — F33.1 MODERATE EPISODE OF RECURRENT MAJOR DEPRESSIVE DISORDER: Primary | ICD-10-CM

## 2022-10-19 DIAGNOSIS — C77.2 METASTASIS TO INTESTINAL LYMPH NODE: Primary | ICD-10-CM

## 2022-10-19 DIAGNOSIS — C80.1 ANXIETY ASSOCIATED WITH CANCER DIAGNOSIS: ICD-10-CM

## 2022-10-19 DIAGNOSIS — F41.1 ANXIETY ASSOCIATED WITH CANCER DIAGNOSIS: ICD-10-CM

## 2022-10-19 PROCEDURE — 96417 CHEMO IV INFUS EACH ADDL SEQ: CPT | Mod: PN

## 2022-10-19 PROCEDURE — 90834 PR PSYCHOTHERAPY W/PATIENT, 45 MIN: ICD-10-PCS | Mod: S$GLB,,, | Performed by: PSYCHOLOGIST

## 2022-10-19 PROCEDURE — 96415 CHEMO IV INFUSION ADDL HR: CPT | Mod: PN

## 2022-10-19 PROCEDURE — 96368 THER/DIAG CONCURRENT INF: CPT | Mod: PN

## 2022-10-19 PROCEDURE — 25000003 PHARM REV CODE 250: Mod: PN | Performed by: INTERNAL MEDICINE

## 2022-10-19 PROCEDURE — 99999 PR PBB SHADOW E&M-EST. PATIENT-LVL I: CPT | Mod: PBBFAC,,, | Performed by: PSYCHOLOGIST

## 2022-10-19 PROCEDURE — 96416 CHEMO PROLONG INFUSE W/PUMP: CPT | Mod: PN

## 2022-10-19 PROCEDURE — 99999 PR PBB SHADOW E&M-EST. PATIENT-LVL I: ICD-10-PCS | Mod: PBBFAC,,, | Performed by: PSYCHOLOGIST

## 2022-10-19 PROCEDURE — 96375 TX/PRO/DX INJ NEW DRUG ADDON: CPT | Mod: PN

## 2022-10-19 PROCEDURE — 96413 CHEMO IV INFUSION 1 HR: CPT | Mod: PN

## 2022-10-19 PROCEDURE — 96367 TX/PROPH/DG ADDL SEQ IV INF: CPT | Mod: PN

## 2022-10-19 PROCEDURE — 90834 PSYTX W PT 45 MINUTES: CPT | Mod: S$GLB,,, | Performed by: PSYCHOLOGIST

## 2022-10-19 PROCEDURE — A4216 STERILE WATER/SALINE, 10 ML: HCPCS | Mod: PN | Performed by: INTERNAL MEDICINE

## 2022-10-19 PROCEDURE — 63600175 PHARM REV CODE 636 W HCPCS: Mod: JW,JG,PN | Performed by: INTERNAL MEDICINE

## 2022-10-19 RX ORDER — PROMETHAZINE HYDROCHLORIDE 12.5 MG/1
TABLET ORAL
Qty: 40 TABLET | Refills: 3 | Status: ON HOLD | OUTPATIENT
Start: 2022-10-19 | End: 2023-05-05

## 2022-10-19 RX ORDER — LORAZEPAM 1 MG/1
1 TABLET ORAL EVERY 12 HOURS PRN
Qty: 30 TABLET | Refills: 0 | Status: SHIPPED | OUTPATIENT
Start: 2022-10-19 | End: 2023-01-05 | Stop reason: SDUPTHER

## 2022-10-19 RX ORDER — ATROPINE SULFATE 0.4 MG/ML
0.4 INJECTION, SOLUTION ENDOTRACHEAL; INTRAMEDULLARY; INTRAMUSCULAR; INTRAVENOUS; SUBCUTANEOUS ONCE AS NEEDED
Status: DISCONTINUED | OUTPATIENT
Start: 2022-10-19 | End: 2022-10-19 | Stop reason: HOSPADM

## 2022-10-19 RX ORDER — SODIUM CHLORIDE 0.9 % (FLUSH) 0.9 %
10 SYRINGE (ML) INJECTION
Status: DISCONTINUED | OUTPATIENT
Start: 2022-10-19 | End: 2022-10-19 | Stop reason: HOSPADM

## 2022-10-19 RX ORDER — LORAZEPAM 2 MG/ML
1 INJECTION INTRAMUSCULAR
Status: COMPLETED | OUTPATIENT
Start: 2022-10-19 | End: 2022-10-19

## 2022-10-19 RX ORDER — BUPROPION HYDROCHLORIDE 150 MG/1
150 TABLET, EXTENDED RELEASE ORAL 2 TIMES DAILY
Qty: 180 TABLET | Refills: 0 | Status: SHIPPED | OUTPATIENT
Start: 2022-10-19 | End: 2023-03-20 | Stop reason: SDUPTHER

## 2022-10-19 RX ADMIN — SODIUM CHLORIDE 440 MG: 0.9 INJECTION, SOLUTION INTRAVENOUS at 01:10

## 2022-10-19 RX ADMIN — BEVACIZUMAB 680 MG: 400 INJECTION, SOLUTION INTRAVENOUS at 12:10

## 2022-10-19 RX ADMIN — Medication 10 ML: at 03:10

## 2022-10-19 RX ADMIN — PALONOSETRON: 0.05 INJECTION, SOLUTION INTRAVENOUS at 11:10

## 2022-10-19 RX ADMIN — LORAZEPAM 1 MG: 2 INJECTION INTRAMUSCULAR; INTRAVENOUS at 11:10

## 2022-10-19 RX ADMIN — PROMETHAZINE HYDROCHLORIDE 6.25 MG: 25 INJECTION INTRAMUSCULAR; INTRAVENOUS at 12:10

## 2022-10-19 RX ADMIN — SODIUM CHLORIDE: 0.9 INJECTION, SOLUTION INTRAVENOUS at 11:10

## 2022-10-19 RX ADMIN — LEUCOVORIN CALCIUM 1010 MG: 350 INJECTION, POWDER, LYOPHILIZED, FOR SOLUTION INTRAMUSCULAR; INTRAVENOUS at 01:10

## 2022-10-19 RX ADMIN — FLUOROURACIL 6050 MG: 50 INJECTION, SOLUTION INTRAVENOUS at 03:10

## 2022-10-19 NOTE — DISCHARGE INSTRUCTIONS
PICC Line Insertion Things to report to your provider:  Diarrhea (4 or more watery stools in a 24 hour period)  Fever greater than 100.4 degrees  Sores in your mouth  Shortness of breath  Vomiting     If you need us before your next appointment, you can reach the [facility] at [phone]. Thank you for choosing us for your care!

## 2022-10-19 NOTE — PROGRESS NOTES
Oncology   Chemotherapy Infusion Visit    Quick Social Service Status Follow Up   Met w/ pt briefly to follow up on social and emotional needs.  SW met with pt to follow up with resources to help with insurance and financial needs.  SW answered question and provided information on applying for Medicaid and Soc Sec disability. Pt attempted to apply for MD on line but the site crashed. SW provided a phone number pt can call instead. SW offered to help with applying for disability. Pt said she will try to apply on her own and let SW know if has difficulties. SW provided support throughout visit. Provided pt wit a donated blanket. Pt denied any further needs.     Radha Giordano, MEDINA  10/19/2022  4:03 PM

## 2022-10-19 NOTE — PLAN OF CARE
Problem: Adult Inpatient Plan of Care  Goal: Patient-Specific Goal (Individualized)  Outcome: Ongoing, Progressing  Flowsheets (Taken 10/19/2022 1145)  Anxieties, Fears or Concerns:   recently lost her job   applying for assistance   meeting with Dr Palm and Radha mcguire  Individualized Care Needs:   recliner, blanket, pillow, support, snack/drink   lights dimmed  Patient-Specific Goals (Include Timeframe): no s/s rxn during txt     Problem: Fatigue  Goal: Improved Activity Tolerance  Intervention: Promote Improved Energy  Flowsheets (Taken 10/19/2022 6794)  Fatigue Management: frequent rest breaks encouraged  Sleep/Rest Enhancement:   natural light exposure provided   noise level reduced  Activity Management:   Ambulated -L4   Ambulated in reyes - L4

## 2022-10-19 NOTE — PLAN OF CARE
Problem: Adult Inpatient Plan of Care  Goal: Plan of Care Review  Outcome: Ongoing, Progressing  Flowsheets (Taken 10/19/2022 1519)  Plan of Care Reviewed With: patient     Patient tolerated treatment. VSS. Port flushed with blood return. 5FU pump initiated at 5.2 ml/hr for the next 46 hours. Connections tightened and secured with coban. Biopatch and central line dressing in place to port. Patient instructed not to shower while port is accessed.   Patient will return on 10/21/22 for pump d/c.  AVS provided and reviewed. Patient ambulated per self upon discharge from infusion center.

## 2022-10-19 NOTE — PROGRESS NOTES
PSYCHO-ONCOLOGY NOTE/ Individual Psychotherapy     Date: 10/19/2022   Site:  [facility]      Therapeutic Intervention: Met with patient.  Outpatient - Supportive psychotherapy 45 min - CPT Code 39999    This includes face to face time and non-face to face time preparing to see the patient, obtaining and/or reviewing separately obtained history, documenting clinical information in the electronic or other health record, independently interpreting results and communicating results to the patient/family/caregiver, or care coordinator.      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    Anxiety    Insomnia    Insomnia    Anxiety    Other constipation    Nausea    Generalized anxiety disorder with panic attacks    Chemotherapy adverse reaction    Mucositis due to chemotherapy    Morbid obesity    Secondary adenocarcinoma of lymph node    Thyroid nodule    Hypercalcemia    Transaminitis    Dysuria    Chemotherapy-induced neutropenia    Hypophosphatemia    Functional diarrhea    Primary hypertension       Chief complaint/reason for encounter: adjustment to illness, depression and anxiety      Met with patient to evaluate psychosocial adaptation to diagnosis/treatment of metastatic colon cancer    Current Medications  Current Outpatient Medications   Medication    acetaminophen (TYLENOL) 500 MG tablet    buPROPion (WELLBUTRIN SR) 150 MG TBSR 12 hr tablet    busPIRone (BUSPAR) 10 MG tablet    duke's soln (benadryl 30 mL, mylanta 30 mL, LIDOcaine 30 mL, nystatin 30 mL) 120mL    granisetron (SANCUSO) 3.1 mg/24 hour    Lactobacillus  rhamnosus GG (CULTURELLE) 10 billion cell capsule    lactulose (CHRONULAC) 10 gram/15 mL solution    levothyroxine (SYNTHROID) 200 MCG tablet    LIDOcaine-prilocaine (EMLA) cream    LORazepam (ATIVAN) 1 MG tablet    multivitamin (THERAGRAN) per tablet    ondansetron (ZOFRAN-ODT) 8 MG TbDL    promethazine (PHENERGAN) 12.5 MG Tab    senna-docusate 8.6-50 mg (PERICOLACE) 8.6-50 mg per tablet    sulfamethoxazole-trimethoprim 800-160mg (BACTRIM DS) 800-160 mg Tab    tamsulosin (FLOMAX) 0.4 mg Cap    traMADoL (ULTRAM) 50 mg tablet    traZODone (DESYREL) 50 MG tablet     No current facility-administered medications for this visit.     Facility-Administered Medications Ordered in Other Visits   Medication Frequency    alteplase injection 2 mg PRN    atropine injection 0.4 mg Once PRN    fluorouracil (ADRUCIL) 2,400 mg/m2 = 6,050 mg in sodium chloride 0.9% 240 mL chemo infusion over 46 hr    irinotecan (CAMPTOSAR) 440 mg in sodium chloride 0.9% 522 mL chemo infusion 1 time in Clinic/HOD    leucovorin calcium 400 mg/m2 = 1,010 mg in dextrose 5 % 250 mL infusion 1 time in Clinic/HOD    sodium chloride 0.9% flush 10 mL PRN       ONCOLOGY HISTORY  Oncology History   Malignant neoplasm of sigmoid colon   3/16/2020 Initial Diagnosis    Malignant neoplasm of sigmoid colon     3/31/2020 Cancer Staged    Staging form: Colon and Rectum, AJCC 8th Edition  - Clinical stage from 3/31/2020: Stage IIIC (cT4b, cN2a, cM0)       5/6/2020 - 11/17/2020 Chemotherapy    Treatment Summary   Plan Name: OP FOLFOX 6 Q2W  Treatment Goal: Curative  Status: Inactive  Start Date: 5/6/2020  End Date: 11/6/2020  Provider: Dylan Leyva MD  Chemotherapy: fluorouraciL injection 945 mg, 400 mg/m2 = 945 mg, Intravenous, Clinic/HOD 1 time, 14 of 14 cycles  Administration: 945 mg (5/6/2020), 945 mg (5/20/2020), 945 mg (6/3/2020), 945 mg (6/17/2020), 945 mg (7/29/2020), 945 mg (8/12/2020), 945 mg (8/26/2020), 945 mg (9/9/2020), 945 mg (9/23/2020), 945 mg  (10/6/2020), 945 mg (10/21/2020), 945 mg (11/4/2020)  fluorouraciL 2,400 mg/m2 = 5,665 mg in sodium chloride 0.9% 240 mL chemo infusion, 2,400 mg/m2 = 5,665 mg, Intravenous, Over 46 hours, 14 of 14 cycles  Administration: 5,665 mg (5/6/2020), 5,665 mg (5/20/2020), 5,665 mg (6/3/2020), 5,665 mg (6/17/2020), 5,665 mg (7/29/2020), 5,665 mg (8/12/2020), 5,665 mg (8/26/2020), 5,665 mg (9/9/2020), 5,665 mg (9/23/2020), 5,665 mg (10/6/2020), 5,665 mg (10/21/2020), 5,665 mg (11/4/2020)  leucovorin calcium 900 mg in dextrose 5 % 250 mL infusion, 945 mg, Intravenous, Clinic/H[facility] 1 time, 14 of 14 cycles  Administration: 900 mg (5/6/2020), 900 mg (5/20/2020), 900 mg (6/3/2020), 900 mg (6/17/2020), 945 mg (6/30/2020), 945 mg (7/20/2020), 945 mg (7/29/2020), 945 mg (8/12/2020), 945 mg (8/26/2020), 945 mg (9/9/2020), 945 mg (9/23/2020), 945 mg (10/6/2020), 945 mg (10/21/2020), 945 mg (11/4/2020)  oxaliplatin (ELOXATIN) 200 mg in dextrose 5 % 500 mL chemo infusion, 201 mg, Intravenous, Clinic/[facility] 1 time, 6 of 6 cycles  Dose modification: 65 mg/m2 (original dose 85 mg/m2, Cycle 6)  Administration: 200 mg (5/6/2020), 200 mg (5/20/2020), 200 mg (6/3/2020), 200 mg (6/17/2020), 201 mg (6/30/2020), 150 mg (7/20/2020)       6/16/2021 -  Chemotherapy    Treatment Summary   Plan Name: OP COLORECTAL FOLFIRI + BEVACIZUMAB Q2W  Treatment Goal: Control  Status: Active  Start Date: 6/16/2021  End Date: 10/21/2022 (Planned)  Provider: Marah Santo MD  Chemotherapy: fluorouraciL injection 990 mg, 400 mg/m2 = 990 mg, Intravenous, [facility] [place] 1 time, 15 of 15 cycles  Administration: 990 mg (6/16/2021), 990 mg (6/30/2021), 990 mg (7/14/2021), 980 mg (7/28/2021), 990 mg (8/11/2021), 990 mg (8/25/2021), 990 mg (9/8/2021), 990 mg (9/22/2021), 990 mg (10/6/2021), 990 mg (10/20/2021), 990 mg (11/3/2021), 990 mg (12/1/2021), 990 mg (12/15/2021), 990 mg (11/17/2021), 990 mg (12/28/2021)  fluorouraciL 2,400 mg/m2 = 5,950 mg in sodium chloride 0.9% 240 mL  chemo infusion, 2,400 mg/m2 = 5,950 mg, Intravenous, Over 46 hours, 27 of 27 cycles  Administration: 5,950 mg (6/16/2021), 5,950 mg (6/30/2021), 5,950 mg (7/14/2021), 5,880 mg (7/28/2021), 5,930 mg (8/11/2021), 5,930 mg (8/25/2021), 5,930 mg (9/8/2021), 5,930 mg (9/22/2021), 5,930 mg (10/6/2021), 5,930 mg (10/20/2021), 5,930 mg (11/3/2021), 5,930 mg (12/1/2021), 5,930 mg (12/15/2021), 5,930 mg (11/17/2021), 5,930 mg (12/28/2021), 6,050 mg (5/11/2022), 6,025 mg (3/9/2022), 6,025 mg (2/23/2022), 5,930 mg (2/2/2022), 5,930 mg (1/19/2022), 6,050 mg (4/20/2022), 6,025 mg (4/6/2022), 6,025 mg (3/23/2022), 6,050 mg (6/1/2022), 6,050 mg (6/15/2022), 6,050 mg (10/5/2022)  bevacizumab (AVASTIN) 600 mg in sodium chloride 0.9% 100 mL chemo infusion, 660 mg, Intravenous, [facility] 1 time, 25 of 25 cycles  Dose modification: 5 mg/kg (original dose 5 mg/kg, Cycle 26, Reason: MD Discretion, Comment: 5 mg/kg original dose)  Administration: 600 mg (6/30/2021), 600 mg (7/14/2021), 600 mg (7/28/2021), 600 mg (6/16/2021), 600 mg (8/11/2021), 655 mg (8/25/2021), 600 mg (9/8/2021), 600 mg (9/22/2021), 600 mg (10/6/2021), 600 mg (10/20/2021), 600 mg (11/3/2021), 655 mg (12/1/2021), 600 mg (12/15/2021), 600 mg (11/17/2021), 600 mg (12/28/2021), 600 mg (5/11/2022), 600 mg (3/9/2022), 600 mg (2/23/2022), 600 mg (2/2/2022), 600 mg (1/19/2022), 600 mg (4/20/2022), 680 mg (4/6/2022), 600 mg (3/23/2022), 680 mg (10/5/2022)  irinotecan (CAMPTOSAR) 440 mg in sodium chloride 0.9% 500 mL chemo infusion, 446 mg, Intravenous, [facility] 1 time, 27 of 27 cycles  Dose modification: 180 mg/m2 (original dose 180 mg/m2, Cycle 24)  Administration: 440 mg (6/16/2021), 440 mg (6/30/2021), 440 mg (7/14/2021), 440 mg (7/28/2021), 440 mg (8/11/2021), 444 mg (8/25/2021), 440 mg (9/8/2021), 440 mg (9/22/2021), 440 mg (10/6/2021), 440 mg (10/20/2021), 440 mg (11/3/2021), 440 mg (12/1/2021), 440 mg (12/15/2021), 440 mg (11/17/2021), 440 mg (12/28/2021), 440 mg  (5/11/2022), 440 mg (3/9/2022), 440 mg (2/23/2022), 440 mg (2/2/2022), 440 mg (1/19/2022), 440 mg (4/20/2022), 440 mg (4/6/2022), 440 mg (3/24/2022), 440 mg (6/1/2022), 440 mg (6/15/2022), 440 mg (10/5/2022)       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   4/3/2020 Initial Diagnosis    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     5/6/2020 - 11/17/2020 Chemotherapy    Treatment Summary   Plan Name: OP FOLFOX 6 Q2W  Treatment Goal: Curative  Status: Inactive  Start Date: 5/6/2020  End Date: 11/6/2020  Provider: Dylan Leyva MD  Chemotherapy: fluorouraciL injection 945 mg, 400 mg/m2 = 945 mg, Intravenous, Clinic/HOD 1 time, 14 of 14 cycles  Administration: 945 mg (5/6/2020), 945 mg (5/20/2020), 945 mg (6/3/2020), 945 mg (6/17/2020), 945 mg (7/29/2020), 945 mg (8/12/2020), 945 mg (8/26/2020), 945 mg (9/9/2020), 945 mg (9/23/2020), 945 mg (10/6/2020), 945 mg (10/21/2020), 945 mg (11/4/2020)  fluorouraciL 2,400 mg/m2 = 5,665 mg in sodium chloride 0.9% 240 mL chemo infusion, 2,400 mg/m2 = 5,665 mg, Intravenous, Over 46 hours, 14 of 14 cycles  Administration: 5,665 mg (5/6/2020), 5,665 mg (5/20/2020), 5,665 mg (6/3/2020), 5,665 mg (6/17/2020), 5,665 mg (7/29/2020), 5,665 mg (8/12/2020), 5,665 mg (8/26/2020), 5,665 mg (9/9/2020), 5,665 mg (9/23/2020), 5,665 mg (10/6/2020), 5,665 mg (10/21/2020), 5,665 mg (11/4/2020)  leucovorin calcium 900 mg in dextrose 5 % 250 mL infusion, 945 mg, Intravenous, Clinic/HOD 1 time, 14 of 14 cycles  Administration: 900 mg (5/6/2020), 900 mg (5/20/2020), 900 mg (6/3/2020), 900 mg (6/17/2020), 945 mg (6/30/2020), 945 mg (7/20/2020), 945 mg (7/29/2020), 945 mg (8/12/2020), 945 mg (8/26/2020), 945 mg (9/9/2020), 945 mg (9/23/2020), 945 mg (10/6/2020), 945 mg (10/21/2020), 945 mg (11/4/2020)  oxaliplatin (ELOXATIN) 200 mg in dextrose 5 % 500 mL chemo infusion, 201 mg, Intravenous, Clinic/HOD 1 time, 6 of 6 cycles  Dose modification: 65 mg/m2 (original dose 85 mg/m2, Cycle 6)  Administration: 200 mg  (5/6/2020), 200 mg (5/20/2020), 200 mg (6/3/2020), 200 mg (6/17/2020), 201 mg (6/30/2020), 150 mg (7/20/2020)       6/16/2021 -  Chemotherapy    Treatment Summary   Plan Name: OP COLORECTAL FOLFIRI + BEVACIZUMAB Q2W  Treatment Goal: Control  Status: Active  Start Date: 6/16/2021  End Date: 10/21/2022 (Planned)  Provider: Marah Santo MD  Chemotherapy: fluorouraciL injection 990 mg, 400 mg/m2 = 990 mg, Intravenous, Clinic/[facility] 1 time, 15 of 15 cycles  Administration: 990 mg (6/16/2021), 990 mg (6/30/2021), 990 mg (7/14/2021), 980 mg (7/28/2021), 990 mg (8/11/2021), 990 mg (8/25/2021), 990 mg (9/8/2021), 990 mg (9/22/2021), 990 mg (10/6/2021), 990 mg (10/20/2021), 990 mg (11/3/2021), 990 mg (12/1/2021), 990 mg (12/15/2021), 990 mg (11/17/2021), 990 mg (12/28/2021)  fluorouraciL 2,400 mg/m2 = 5,950 mg in sodium chloride 0.9% 240 mL chemo infusion, 2,400 mg/m2 = 5,950 mg, Intravenous, Over 46 hours, 27 of 27 cycles  Administration: 5,950 mg (6/16/2021), 5,950 mg (6/30/2021), 5,950 mg (7/14/2021), 5,880 mg (7/28/2021), 5,930 mg (8/11/2021), 5,930 mg (8/25/2021), 5,930 mg (9/8/2021), 5,930 mg (9/22/2021), 5,930 mg (10/6/2021), 5,930 mg (10/20/2021), 5,930 mg (11/3/2021), 5,930 mg (12/1/2021), 5,930 mg (12/15/2021), 5,930 mg (11/17/2021), 5,930 mg (12/28/2021), 6,050 mg (5/11/2022), 6,025 mg (3/9/2022), 6,025 mg (2/23/2022), 5,930 mg (2/2/2022), 5,930 mg (1/19/2022), 6,050 mg (4/20/2022), 6,025 mg (4/6/2022), 6,025 mg (3/23/2022), 6,050 mg (6/1/2022), 6,050 mg (6/15/2022), 6,050 mg (10/5/2022)  bevacizumab (AVASTIN) 600 mg in sodium chloride 0.9% 100 mL chemo infusion, 660 mg, Intravenous, [facility] 1 time, 25 of 25 cycles  Dose modification: 5 mg/kg (original dose 5 mg/kg, Cycle 26, Reason: MD Discretion, Comment: 5 mg/kg original dose)  Administration: 600 mg (6/30/2021), 600 mg (7/14/2021), 600 mg (7/28/2021), 600 mg (6/16/2021), 600 mg (8/11/2021), 655 mg (8/25/2021), 600 mg (9/8/2021), 600 mg (9/22/2021), 600 mg  (10/6/2021), 600 mg (10/20/2021), 600 mg (11/3/2021), 655 mg (12/1/2021), 600 mg (12/15/2021), 600 mg (11/17/2021), 600 mg (12/28/2021), 600 mg (5/11/2022), 600 mg (3/9/2022), 600 mg (2/23/2022), 600 mg (2/2/2022), 600 mg (1/19/2022), 600 mg (4/20/2022), 680 mg (4/6/2022), 600 mg (3/23/2022), 680 mg (10/5/2022)  irinotecan (CAMPTOSAR) 440 mg in sodium chloride 0.9% 500 mL chemo infusion, 446 mg, Intravenous, Clinic/hospitals 1 time, 27 of 27 cycles  Dose modification: 180 mg/m2 (original dose 180 mg/m2, Cycle 24)  Administration: 440 mg (6/16/2021), 440 mg (6/30/2021), 440 mg (7/14/2021), 440 mg (7/28/2021), 440 mg (8/11/2021), 444 mg (8/25/2021), 440 mg (9/8/2021), 440 mg (9/22/2021), 440 mg (10/6/2021), 440 mg (10/20/2021), 440 mg (11/3/2021), 440 mg (12/1/2021), 440 mg (12/15/2021), 440 mg (11/17/2021), 440 mg (12/28/2021), 440 mg (5/11/2022), 440 mg (3/9/2022), 440 mg (2/23/2022), 440 mg (2/2/2022), 440 mg (1/19/2022), 440 mg (4/20/2022), 440 mg (4/6/2022), 440 mg (3/24/2022), 440 mg (6/1/2022), 440 mg (6/15/2022), 440 mg (10/5/2022)           Objective:  Gail Mckinnon arrived promptly for the session, meeting during her scheduled infusion. Ms. Mckinnon was immobile at the time of session. The patient was fully cooperative throughout the session.  Appearance: age appropriate, casually  dressed, well groomed  Behavior/Cooperation: friendly and cooperative  Speech: normal in rate, volume, and tone and appropriate quality, quantity and organization of sentences  Mood: steady  Affect: euthymic and mood congruent  Thought Process: goal-directed, logical  Thought Content: normal,  No delusions or paranoia; did not appear to be responding to internal stimuli during the session  Orientation: grossly intact  Memory: grossly intact  Attention Span/Concentration: Attends to session without distraction; reports no difficulty  Fund of Knowledge: average  Estimate of Intelligence: average from verbal skills and  history  Cognition: grossly intact  Insight: patient has awareness of illness; good insight into own behavior and behavior of others  Judgment: the patient's behavior is adequate to circumstances    NCCN Distress thermometer:   DISTRESS SCREENING 10/14/2022 9/30/2022 7/26/2022 7/26/2022 7/26/2022 6/13/2022 6/6/2022   Distress Score 7 8 4 4 4 6 6   Practical Problems Insurance/Financial;Work/School;Treatment Decisions Insurance/Financial;Treatment Decisions Insurance/Financial Insurance/Financial Insurance/Financial - -   Family Problems None of these None of these None of these None of these None of these - -   Emotional Problems Depression;Fears;Worry Depression;Sadness;Worry Depression;Worry Depression;Worry Depression;Worry - -   Spiritual / Cheondoism Concerns No No No No No No No   Physical Problems Fatigue;Sleep Fatigue None of these None of these None of these - -   Other Problems - - - - - - -   Some encounter information is confidential and restricted. Go to Review Flowsheets activity to see all data.        Interval history and content of current session: Discussed additional tx and adaptation to post-surgical progression of illness. Processed associated emotions at length. Reports to be coping with moderate difficulty, stating that recent loss of job has been difficult to navigate as she maintains w/ current tx regimen (prior to clinical trial). Evaluated cognitive response, paying particular attention to negative intrusive thoughts of a persistent and detrimental nature. Thoughts of this type are in evidence with moderate distress and are further associated w/ recent medical and financial difficulties. Provided cognitive behavioral therapy to address negative cognitions, as well as, additional psychotherapeutic support. Identified and evaluated psychosocial and environmental stressors secondary to diagnosis and treatment.  Examined proactive behaviors that may be implemented to minimize or ameliorate  psychosocial stressors secondary to diagnosis and treatment.       Risk parameters:   Patient reports no suicidal ideation  Patient reports no homicidal ideation  Patient reports no self-injurious behavior  Patient reports no violent behavior   Safety needs:  None at this time      Verbal deficits: None     Patient's response to intervention:The patient's response to intervention is accepting, motivated.     Progress toward goals and other mental status changes:  The patient's progress toward goals is limited.      Progress to date:No Progress - Continue Objectives      Goals from last visit: Attempted, partially met        Patient Strengths: verbal, motivated, intelligent, successful, good social support, good insight, commitment to wellness, strong cultural traditions        Treatment Plan:individual psychotherapy  Target symptoms: depression, anxiety, adjustment  Why chosen therapy is appropriate versus another modality: relevant to diagnosis, patient responds to this modality, evidence based practice  Outcome monitoring methods: self-report, observation, tx team feedback  Therapeutic intervention type: insight oriented psychotherapy, supportive psychotherapy  Prognosis: Good                            Behavioral goals:               Exercise: continued/increased as per physician recommendations              Stress management: appropriate boundaries and accepting aid when needed              Social engagement: continued and increased especially w/ grandchildren/family, as per CDC COVID-19 guidelines              Therapy: identity and accurate language, adaptive coping, CBT (cognitive restructuring)    Return to clinic: 3 weeks     Length of Service (minutes direct face-to-face contact): 45    Diagnosis:     ICD-10-CM ICD-9-CM   1. Moderate episode of recurrent major depressive disorder  F33.1 296.32   2. Anxiety associated with cancer diagnosis  F41.1 300.09    C80.1    3. Malignant neoplasm of sigmoid colon   C18.7 153.3                Marky Palm Psy.D.  [place] License #[id number]  [place] License #[id number]

## 2022-10-21 ENCOUNTER — INFUSION (OUTPATIENT)
Dept: INFUSION THERAPY | Facility: HOSPITAL | Age: 52
End: 2022-10-21
Attending: INTERNAL MEDICINE
Payer: COMMERCIAL

## 2022-10-21 VITALS
TEMPERATURE: 98 F | SYSTOLIC BLOOD PRESSURE: 160 MMHG | OXYGEN SATURATION: 99 % | DIASTOLIC BLOOD PRESSURE: 86 MMHG | RESPIRATION RATE: 20 BRPM | HEART RATE: 84 BPM

## 2022-10-21 DIAGNOSIS — C77.2 METASTASIS TO INTESTINAL LYMPH NODE: Primary | ICD-10-CM

## 2022-10-21 DIAGNOSIS — C18.7 MALIGNANT NEOPLASM OF SIGMOID COLON: ICD-10-CM

## 2022-10-21 PROCEDURE — A4216 STERILE WATER/SALINE, 10 ML: HCPCS | Mod: PN | Performed by: INTERNAL MEDICINE

## 2022-10-21 PROCEDURE — 25000003 PHARM REV CODE 250: Mod: PN | Performed by: INTERNAL MEDICINE

## 2022-10-21 PROCEDURE — 63600175 PHARM REV CODE 636 W HCPCS: Mod: PN | Performed by: INTERNAL MEDICINE

## 2022-10-21 RX ORDER — SODIUM CHLORIDE 0.9 % (FLUSH) 0.9 %
10 SYRINGE (ML) INJECTION
Status: DISCONTINUED | OUTPATIENT
Start: 2022-10-21 | End: 2022-10-21 | Stop reason: HOSPADM

## 2022-10-21 RX ORDER — HEPARIN 100 UNIT/ML
500 SYRINGE INTRAVENOUS
Status: DISCONTINUED | OUTPATIENT
Start: 2022-10-21 | End: 2022-10-21 | Stop reason: HOSPADM

## 2022-10-21 RX ADMIN — Medication 10 ML: at 01:10

## 2022-10-21 RX ADMIN — Medication 500 UNITS: at 01:10

## 2022-10-23 ENCOUNTER — PATIENT MESSAGE (OUTPATIENT)
Dept: FAMILY MEDICINE | Facility: CLINIC | Age: 52
End: 2022-10-23
Payer: COMMERCIAL

## 2022-10-25 RX ORDER — OLMESARTAN MEDOXOMIL 20 MG/1
20 TABLET ORAL DAILY
Qty: 30 TABLET | Refills: 5 | Status: SHIPPED | OUTPATIENT
Start: 2022-10-25 | End: 2023-05-22 | Stop reason: SDUPTHER

## 2022-10-31 ENCOUNTER — OFFICE VISIT (OUTPATIENT)
Dept: HEMATOLOGY/ONCOLOGY | Facility: CLINIC | Age: 52
End: 2022-10-31
Payer: COMMERCIAL

## 2022-10-31 ENCOUNTER — LAB VISIT (OUTPATIENT)
Dept: LAB | Facility: HOSPITAL | Age: 52
End: 2022-10-31
Attending: NURSE PRACTITIONER
Payer: COMMERCIAL

## 2022-10-31 VITALS
TEMPERATURE: 98 F | OXYGEN SATURATION: 98 % | RESPIRATION RATE: 16 BRPM | WEIGHT: 293 LBS | HEIGHT: 66 IN | SYSTOLIC BLOOD PRESSURE: 130 MMHG | BODY MASS INDEX: 47.09 KG/M2 | HEART RATE: 78 BPM | DIASTOLIC BLOOD PRESSURE: 80 MMHG

## 2022-10-31 DIAGNOSIS — C18.7 MALIGNANT NEOPLASM OF SIGMOID COLON: ICD-10-CM

## 2022-10-31 DIAGNOSIS — E83.52 HYPERCALCEMIA: ICD-10-CM

## 2022-10-31 DIAGNOSIS — R11.0 CHEMOTHERAPY-INDUCED NAUSEA: ICD-10-CM

## 2022-10-31 DIAGNOSIS — R74.01 TRANSAMINITIS: ICD-10-CM

## 2022-10-31 DIAGNOSIS — T45.1X5A CHEMOTHERAPY-INDUCED NAUSEA: ICD-10-CM

## 2022-10-31 DIAGNOSIS — C18.7 MALIGNANT NEOPLASM OF SIGMOID COLON: Primary | ICD-10-CM

## 2022-10-31 LAB
ALBUMIN SERPL BCP-MCNC: 3.7 G/DL (ref 3.5–5.2)
ALP SERPL-CCNC: 205 U/L (ref 55–135)
ALT SERPL W/O P-5'-P-CCNC: 58 U/L (ref 10–44)
ANION GAP SERPL CALC-SCNC: 7 MMOL/L (ref 8–16)
AST SERPL-CCNC: 36 U/L (ref 10–40)
BASOPHILS # BLD AUTO: 0.04 K/UL (ref 0–0.2)
BASOPHILS NFR BLD: 0.6 % (ref 0–1.9)
BILIRUB SERPL-MCNC: 0.3 MG/DL (ref 0.1–1)
BUN SERPL-MCNC: 10 MG/DL (ref 6–20)
CALCIUM SERPL-MCNC: 10.7 MG/DL (ref 8.7–10.5)
CHLORIDE SERPL-SCNC: 109 MMOL/L (ref 95–110)
CO2 SERPL-SCNC: 23 MMOL/L (ref 23–29)
CREAT SERPL-MCNC: 0.9 MG/DL (ref 0.5–1.4)
DIFFERENTIAL METHOD: ABNORMAL
EOSINOPHIL # BLD AUTO: 0.3 K/UL (ref 0–0.5)
EOSINOPHIL NFR BLD: 4.8 % (ref 0–8)
ERYTHROCYTE [DISTWIDTH] IN BLOOD BY AUTOMATED COUNT: 15.6 % (ref 11.5–14.5)
EST. GFR  (NO RACE VARIABLE): >60 ML/MIN/1.73 M^2
GLUCOSE SERPL-MCNC: 111 MG/DL (ref 70–110)
HCT VFR BLD AUTO: 44.5 % (ref 37–48.5)
HGB BLD-MCNC: 14.6 G/DL (ref 12–16)
IMM GRANULOCYTES # BLD AUTO: 0.02 K/UL (ref 0–0.04)
IMM GRANULOCYTES NFR BLD AUTO: 0.3 % (ref 0–0.5)
LYMPHOCYTES # BLD AUTO: 2.1 K/UL (ref 1–4.8)
LYMPHOCYTES NFR BLD: 31.1 % (ref 18–48)
MAGNESIUM SERPL-MCNC: 2.1 MG/DL (ref 1.6–2.6)
MCH RBC QN AUTO: 28.5 PG (ref 27–31)
MCHC RBC AUTO-ENTMCNC: 32.8 G/DL (ref 32–36)
MCV RBC AUTO: 87 FL (ref 82–98)
MONOCYTES # BLD AUTO: 0.6 K/UL (ref 0.3–1)
MONOCYTES NFR BLD: 9.5 % (ref 4–15)
NEUTROPHILS # BLD AUTO: 3.6 K/UL (ref 1.8–7.7)
NEUTROPHILS NFR BLD: 53.7 % (ref 38–73)
NRBC BLD-RTO: 0 /100 WBC
PLATELET # BLD AUTO: 292 K/UL (ref 150–450)
PMV BLD AUTO: 9.5 FL (ref 9.2–12.9)
POTASSIUM SERPL-SCNC: 4.3 MMOL/L (ref 3.5–5.1)
PROT SERPL-MCNC: 7.3 G/DL (ref 6–8.4)
RBC # BLD AUTO: 5.12 M/UL (ref 4–5.4)
SODIUM SERPL-SCNC: 139 MMOL/L (ref 136–145)
WBC # BLD AUTO: 6.66 K/UL (ref 3.9–12.7)

## 2022-10-31 PROCEDURE — 36415 COLL VENOUS BLD VENIPUNCTURE: CPT | Mod: PN | Performed by: INTERNAL MEDICINE

## 2022-10-31 PROCEDURE — 4010F ACE/ARB THERAPY RXD/TAKEN: CPT | Mod: CPTII,S$GLB,, | Performed by: NURSE PRACTITIONER

## 2022-10-31 PROCEDURE — 3075F PR MOST RECENT SYSTOLIC BLOOD PRESS GE 130-139MM HG: ICD-10-PCS | Mod: CPTII,S$GLB,, | Performed by: NURSE PRACTITIONER

## 2022-10-31 PROCEDURE — 3079F DIAST BP 80-89 MM HG: CPT | Mod: CPTII,S$GLB,, | Performed by: NURSE PRACTITIONER

## 2022-10-31 PROCEDURE — 99999 PR PBB SHADOW E&M-EST. PATIENT-LVL V: CPT | Mod: PBBFAC,,, | Performed by: NURSE PRACTITIONER

## 2022-10-31 PROCEDURE — 4010F PR ACE/ARB THEARPY RXD/TAKEN: ICD-10-PCS | Mod: CPTII,S$GLB,, | Performed by: NURSE PRACTITIONER

## 2022-10-31 PROCEDURE — 83735 ASSAY OF MAGNESIUM: CPT | Mod: PN | Performed by: INTERNAL MEDICINE

## 2022-10-31 PROCEDURE — 1160F RVW MEDS BY RX/DR IN RCRD: CPT | Mod: CPTII,S$GLB,, | Performed by: NURSE PRACTITIONER

## 2022-10-31 PROCEDURE — 80053 COMPREHEN METABOLIC PANEL: CPT | Mod: PN | Performed by: INTERNAL MEDICINE

## 2022-10-31 PROCEDURE — 85025 COMPLETE CBC W/AUTO DIFF WBC: CPT | Mod: PN | Performed by: INTERNAL MEDICINE

## 2022-10-31 PROCEDURE — 3079F PR MOST RECENT DIASTOLIC BLOOD PRESSURE 80-89 MM HG: ICD-10-PCS | Mod: CPTII,S$GLB,, | Performed by: NURSE PRACTITIONER

## 2022-10-31 PROCEDURE — 99214 OFFICE O/P EST MOD 30 MIN: CPT | Mod: S$GLB,,, | Performed by: NURSE PRACTITIONER

## 2022-10-31 PROCEDURE — 99999 PR PBB SHADOW E&M-EST. PATIENT-LVL V: ICD-10-PCS | Mod: PBBFAC,,, | Performed by: NURSE PRACTITIONER

## 2022-10-31 PROCEDURE — 1160F PR REVIEW ALL MEDS BY PRESCRIBER/CLIN PHARMACIST DOCUMENTED: ICD-10-PCS | Mod: CPTII,S$GLB,, | Performed by: NURSE PRACTITIONER

## 2022-10-31 PROCEDURE — 3075F SYST BP GE 130 - 139MM HG: CPT | Mod: CPTII,S$GLB,, | Performed by: NURSE PRACTITIONER

## 2022-10-31 PROCEDURE — 99214 PR OFFICE/OUTPT VISIT, EST, LEVL IV, 30-39 MIN: ICD-10-PCS | Mod: S$GLB,,, | Performed by: NURSE PRACTITIONER

## 2022-10-31 PROCEDURE — 1159F PR MEDICATION LIST DOCUMENTED IN MEDICAL RECORD: ICD-10-PCS | Mod: CPTII,S$GLB,, | Performed by: NURSE PRACTITIONER

## 2022-10-31 PROCEDURE — 1159F MED LIST DOCD IN RCRD: CPT | Mod: CPTII,S$GLB,, | Performed by: NURSE PRACTITIONER

## 2022-10-31 RX ORDER — LORAZEPAM 2 MG/ML
1 INJECTION INTRAMUSCULAR
Status: CANCELLED | OUTPATIENT
Start: 2022-11-02 | End: 2022-11-02

## 2022-10-31 RX ORDER — ATROPINE SULFATE 0.4 MG/ML
0.4 INJECTION, SOLUTION ENDOTRACHEAL; INTRAMEDULLARY; INTRAMUSCULAR; INTRAVENOUS; SUBCUTANEOUS ONCE AS NEEDED
Status: CANCELLED | OUTPATIENT
Start: 2022-11-02

## 2022-10-31 RX ORDER — HEPARIN 100 UNIT/ML
500 SYRINGE INTRAVENOUS
Status: CANCELLED | OUTPATIENT
Start: 2022-11-04

## 2022-10-31 RX ORDER — SODIUM CHLORIDE 0.9 % (FLUSH) 0.9 %
10 SYRINGE (ML) INJECTION
Status: CANCELLED | OUTPATIENT
Start: 2022-11-02

## 2022-10-31 RX ORDER — HEPARIN 100 UNIT/ML
500 SYRINGE INTRAVENOUS
Status: CANCELLED | OUTPATIENT
Start: 2022-11-07

## 2022-10-31 RX ORDER — PROCHLORPERAZINE MALEATE 10 MG
10 TABLET ORAL EVERY 6 HOURS PRN
Qty: 30 TABLET | Refills: 6 | Status: SHIPPED | OUTPATIENT
Start: 2022-10-31 | End: 2023-01-04 | Stop reason: SDUPTHER

## 2022-10-31 RX ORDER — SODIUM CHLORIDE 9 MG/ML
1000 INJECTION, SOLUTION INTRAVENOUS CONTINUOUS
Status: CANCELLED | OUTPATIENT
Start: 2022-11-04

## 2022-10-31 RX ORDER — SODIUM CHLORIDE 0.9 % (FLUSH) 0.9 %
10 SYRINGE (ML) INJECTION
Status: CANCELLED | OUTPATIENT
Start: 2022-11-04

## 2022-10-31 RX ORDER — SODIUM CHLORIDE 9 MG/ML
1000 INJECTION, SOLUTION INTRAVENOUS CONTINUOUS
Status: CANCELLED | OUTPATIENT
Start: 2022-11-07

## 2022-10-31 RX ORDER — HEPARIN 100 UNIT/ML
500 SYRINGE INTRAVENOUS
Status: CANCELLED | OUTPATIENT
Start: 2022-11-02

## 2022-10-31 RX ORDER — SODIUM CHLORIDE 0.9 % (FLUSH) 0.9 %
10 SYRINGE (ML) INJECTION
Status: CANCELLED | OUTPATIENT
Start: 2022-11-07

## 2022-10-31 RX ORDER — BUSPIRONE HYDROCHLORIDE 10 MG/1
10 TABLET ORAL 2 TIMES DAILY
Qty: 180 TABLET | Refills: 0 | OUTPATIENT
Start: 2022-10-31

## 2022-10-31 NOTE — PROGRESS NOTES
Subjective:      Name: Gail Mckinnon  : 1970  MRN: [id number]    CC:  Clearance prior to chemotherapy    HPI:   Gail Mckinnon is a 52 y.o. female presents for evaluation for a diagnosis of Colon cancer, initially Stage III & now with LN recurrence.    She completed adjuvant FOLFOX in 2020.   She tolerated only 4 cycles of FOLFOX before she developed an infusion reaction to oxaliplatin in cycle 5 was well as cycle 6. She then completed 6 cycles of infusional 5 FU.     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 She was offered second line therapy with FOLFIRI and Avastin.      She presented to the ED on  with left flank pain. CT renal stone study showed Moderate hydronephrosis on the left secondary to 3 mm calculus at the UPJ.     She had a PET scan mid April that showed possible progression of her disease.     She has been to [facility] for another opinion. No change was recommended in systemic therapy but she was offered surgical removal of the aorta caval nodes.  Recent sans at [facility]  show stable disease.       Surgery done 22. Received 2 more cycle of FOLFIRI without Avastin.      She had repeat imaging at [facility] on 22 that unfortunately showed disease progression since her surgery with multiple RP nodes and one mediastinal node.     Interim history:     She presents for a follow up visit with her daughter prior to next cycle of FOLFIRI + Avastin.  She reports nausea starts the Friday before her pump comes off.  Zofran & Sancuso do not help.  Phenergan has mild effect.  Dr. Mederos has addressed her HTN.  She has a lot of stress with progressive disease, surgery, loss of job & bills.  Radha is helping.  She reports low grade temps during last treatment.    Worried about weight gain.  Has spoke with dietary on options.  Discussed stress on weight.  Follows with Dr. Palm.      Oncology History   Malignant neoplasm of sigmoid colon    3/16/2020 Initial Diagnosis    Malignant neoplasm of sigmoid colon     3/31/2020 Cancer Staged    Staging form: Colon and Rectum, AJCC 8th Edition  - Clinical stage from 3/31/2020: Stage IIIC (cT4b, cN2a, cM0)       5/6/2020 - 11/17/2020 Chemotherapy    Treatment Summary   Plan Name: OP FOLFOX 6 Q2W  Treatment Goal: Curative  Status: Inactive  Start Date: 5/6/2020  End Date: 11/6/2020  Provider: Dylan Leyva MD  Chemotherapy: fluorouraciL injection 945 mg, 400 mg/m2 = 945 mg, Intravenous, Clinic/[facility] 1 time, 14 of 14 cycles  Administration: 945 mg (5/6/2020), 945 mg (5/20/2020), 945 mg (6/3/2020), 945 mg (6/17/2020), 945 mg (7/29/2020), 945 mg (8/12/2020), 945 mg (8/26/2020), 945 mg (9/9/2020), 945 mg (9/23/2020), 945 mg (10/6/2020), 945 mg (10/21/2020), 945 mg (11/4/2020)  fluorouraciL 2,400 mg/m2 = 5,665 mg in sodium chloride 0.9% 240 mL chemo infusion, 2,400 mg/m2 = 5,665 mg, Intravenous, Over 46 hours, 14 of 14 cycles  Administration: 5,665 mg (5/6/2020), 5,665 mg (5/20/2020), 5,665 mg (6/3/2020), 5,665 mg (6/17/2020), 5,665 mg (7/29/2020), 5,665 mg (8/12/2020), 5,665 mg (8/26/2020), 5,665 mg (9/9/2020), 5,665 mg (9/23/2020), 5,665 mg (10/6/2020), 5,665 mg (10/21/2020), 5,665 mg (11/4/2020)  leucovorin calcium 900 mg in dextrose 5 % 250 mL infusion, 945 mg, Intravenous, Clinic/[facility] 1 time, 14 of 14 cycles  Administration: 900 mg (5/6/2020), 900 mg (5/20/2020), 900 mg (6/3/2020), 900 mg (6/17/2020), 945 mg (6/30/2020), 945 mg (7/20/2020), 945 mg (7/29/2020), 945 mg (8/12/2020), 945 mg (8/26/2020), 945 mg (9/9/2020), 945 mg (9/23/2020), 945 mg (10/6/2020), 945 mg (10/21/2020), 945 mg (11/4/2020)  oxaliplatin (ELOXATIN) 200 mg in dextrose 5 % 500 mL chemo infusion, 201 mg, Intravenous, [facility] 1 time, 6 of 6 cycles  Dose modification: 65 mg/m2 (original dose 85 mg/m2, Cycle 6)  Administration: 200 mg (5/6/2020), 200 mg (5/20/2020), 200 mg (6/3/2020), 200 mg (6/17/2020), 201 mg (6/30/2020), 150 mg  (7/20/2020)       6/16/2021 -  Chemotherapy    Treatment Summary   Plan Name: OP COLORECTAL FOLFIRI + BEVACIZUMAB Q2W  Treatment Goal: Control  Status: Active  Start Date: 6/16/2021  End Date: 11/4/2022 (Planned)  Provider: Marah Santo MD  Chemotherapy: fluorouraciL injection 990 mg, 400 mg/m2 = 990 mg, Intravenous, Clinic/[facility] 1 time, 15 of 15 cycles  Administration: 990 mg (6/16/2021), 990 mg (6/30/2021), 990 mg (7/14/2021), 980 mg (7/28/2021), 990 mg (8/11/2021), 990 mg (8/25/2021), 990 mg (9/8/2021), 990 mg (9/22/2021), 990 mg (10/6/2021), 990 mg (10/20/2021), 990 mg (11/3/2021), 990 mg (12/1/2021), 990 mg (12/15/2021), 990 mg (11/17/2021), 990 mg (12/28/2021)  fluorouraciL 2,400 mg/m2 = 5,950 mg in sodium chloride 0.9% 240 mL chemo infusion, 2,400 mg/m2 = 5,950 mg, Intravenous, Over 46 hours, 27 of 28 cycles  Administration: 5,950 mg (6/16/2021), 5,950 mg (6/30/2021), 5,950 mg (7/14/2021), 5,880 mg (7/28/2021), 5,930 mg (8/11/2021), 5,930 mg (8/25/2021), 5,930 mg (9/8/2021), 5,930 mg (9/22/2021), 5,930 mg (10/6/2021), 5,930 mg (10/20/2021), 5,930 mg (11/3/2021), 5,930 mg (12/1/2021), 5,930 mg (12/15/2021), 5,930 mg (11/17/2021), 5,930 mg (12/28/2021), 6,050 mg (5/11/2022), 6,025 mg (3/9/2022), 6,025 mg (2/23/2022), 5,930 mg (2/2/2022), 5,930 mg (1/19/2022), 6,050 mg (4/20/2022), 6,025 mg (4/6/2022), 6,025 mg (3/23/2022), 6,050 mg (6/1/2022), 6,050 mg (6/15/2022), 6,050 mg (10/19/2022), 6,050 mg (10/5/2022)  bevacizumab (AVASTIN) 600 mg in sodium chloride 0.9% 100 mL chemo infusion, 660 mg, Intravenous, [facility] 1 time, 25 of 26 cycles  Dose modification: 5 mg/kg (original dose 5 mg/kg, Cycle 26, Reason: MD Discretion, Comment: 5 mg/kg original dose)  Administration: 600 mg (6/30/2021), 600 mg (7/14/2021), 600 mg (7/28/2021), 600 mg (6/16/2021), 600 mg (8/11/2021), 655 mg (8/25/2021), 600 mg (9/8/2021), 600 mg (9/22/2021), 600 mg (10/6/2021), 600 mg (10/20/2021), 600 mg (11/3/2021), 655 mg (12/1/2021), 600  mg (12/15/2021), 600 mg (11/17/2021), 600 mg (12/28/2021), 600 mg (5/11/2022), 600 mg (3/9/2022), 600 mg (2/23/2022), 600 mg (2/2/2022), 600 mg (1/19/2022), 600 mg (4/20/2022), 680 mg (4/6/2022), 600 mg (3/23/2022), 680 mg (10/5/2022), 680 mg (10/19/2022)  irinotecan (CAMPTOSAR) 440 mg in sodium chloride 0.9% 500 mL chemo infusion, 446 mg, Intravenous, [facility] 1 time, 27 of 28 cycles  Dose modification: 180 mg/m2 (original dose 180 mg/m2, Cycle 24)  Administration: 440 mg (6/16/2021), 440 mg (6/30/2021), 440 mg (7/14/2021), 440 mg (7/28/2021), 440 mg (8/11/2021), 444 mg (8/25/2021), 440 mg (9/8/2021), 440 mg (9/22/2021), 440 mg (10/6/2021), 440 mg (10/20/2021), 440 mg (11/3/2021), 440 mg (12/1/2021), 440 mg (12/15/2021), 440 mg (11/17/2021), 440 mg (12/28/2021), 440 mg (5/11/2022), 440 mg (3/9/2022), 440 mg (2/23/2022), 440 mg (2/2/2022), 440 mg (1/19/2022), 440 mg (4/20/2022), 440 mg (4/6/2022), 440 mg (3/24/2022), 440 mg (6/1/2022), 440 mg (6/15/2022), 440 mg (10/19/2022), 440 mg (10/5/2022)       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   4/3/2020 Initial Diagnosis    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     5/6/2020 - 11/17/2020 Chemotherapy    Treatment Summary   Plan Name: OP FOLFOX 6 Q2W  Treatment Goal: Curative  Status: Inactive  Start Date: 5/6/2020  End Date: 11/6/2020  Provider: Dylan Leyva MD  Chemotherapy: fluorouraciL injection 945 mg, 400 mg/m2 = 945 mg, Intravenous, [facility] 1 time, 14 of 14 cycles  Administration: 945 mg (5/6/2020), 945 mg (5/20/2020), 945 mg (6/3/2020), 945 mg (6/17/2020), 945 mg (7/29/2020), 945 mg (8/12/2020), 945 mg (8/26/2020), 945 mg (9/9/2020), 945 mg (9/23/2020), 945 mg (10/6/2020), 945 mg (10/21/2020), 945 mg (11/4/2020)  fluorouraciL 2,400 mg/m2 = 5,665 mg in sodium chloride 0.9% 240 mL chemo infusion, 2,400 mg/m2 = 5,665 mg, Intravenous, Over 46 hours, 14 of 14 cycles  Administration: 5,665 mg (5/6/2020), 5,665 mg (5/20/2020), 5,665 mg (6/3/2020), 5,665 mg  (6/17/2020), 5,665 mg (7/29/2020), 5,665 mg (8/12/2020), 5,665 mg (8/26/2020), 5,665 mg (9/9/2020), 5,665 mg (9/23/2020), 5,665 mg (10/6/2020), 5,665 mg (10/21/2020), 5,665 mg (11/4/2020)  leucovorin calcium 900 mg in dextrose 5 % 250 mL infusion, 945 mg, Intravenous, Clinic/[facility] 1 time, 14 of 14 cycles  Administration: 900 mg (5/6/2020), 900 mg (5/20/2020), 900 mg (6/3/2020), 900 mg (6/17/2020), 945 mg (6/30/2020), 945 mg (7/20/2020), 945 mg (7/29/2020), 945 mg (8/12/2020), 945 mg (8/26/2020), 945 mg (9/9/2020), 945 mg (9/23/2020), 945 mg (10/6/2020), 945 mg (10/21/2020), 945 mg (11/4/2020)  oxaliplatin (ELOXATIN) 200 mg in dextrose 5 % 500 mL chemo infusion, 201 mg, Intravenous, Clinic/[facility] 1 time, 6 of 6 cycles  Dose modification: 65 mg/m2 (original dose 85 mg/m2, Cycle 6)  Administration: 200 mg (5/6/2020), 200 mg (5/20/2020), 200 mg (6/3/2020), 200 mg (6/17/2020), 201 mg (6/30/2020), 150 mg (7/20/2020)       6/16/2021 -  Chemotherapy    Treatment Summary   Plan Name: OP COLORECTAL FOLFIRI + BEVACIZUMAB Q2W  Treatment Goal: Control  Status: Active  Start Date: 6/16/2021  End Date: 11/4/2022 (Planned)  Provider: Marah Santo MD  Chemotherapy: fluorouraciL injection 990 mg, 400 mg/m2 = 990 mg, Intravenous, Clinic/[facility] 1 time, 15 of 15 cycles  Administration: 990 mg (6/16/2021), 990 mg (6/30/2021), 990 mg (7/14/2021), 980 mg (7/28/2021), 990 mg (8/11/2021), 990 mg (8/25/2021), 990 mg (9/8/2021), 990 mg (9/22/2021), 990 mg (10/6/2021), 990 mg (10/20/2021), 990 mg (11/3/2021), 990 mg (12/1/2021), 990 mg (12/15/2021), 990 mg (11/17/2021), 990 mg (12/28/2021)  fluorouraciL 2,400 mg/m2 = 5,950 mg in sodium chloride 0.9% 240 mL chemo infusion, 2,400 mg/m2 = 5,950 mg, Intravenous, Over 46 hours, 27 of 28 cycles  Administration: 5,950 mg (6/16/2021), 5,950 mg (6/30/2021), 5,950 mg (7/14/2021), 5,880 mg (7/28/2021), 5,930 mg (8/11/2021), 5,930 mg (8/25/2021), 5,930 mg (9/8/2021), 5,930 mg (9/22/2021), 5,930 mg (10/6/2021),  5,930 mg (10/20/2021), 5,930 mg (11/3/2021), 5,930 mg (12/1/2021), 5,930 mg (12/15/2021), 5,930 mg (11/17/2021), 5,930 mg (12/28/2021), 6,050 mg (5/11/2022), 6,025 mg (3/9/2022), 6,025 mg (2/23/2022), 5,930 mg (2/2/2022), 5,930 mg (1/19/2022), 6,050 mg (4/20/2022), 6,025 mg (4/6/2022), 6,025 mg (3/23/2022), 6,050 mg (6/1/2022), 6,050 mg (6/15/2022), 6,050 mg (10/19/2022), 6,050 mg (10/5/2022)  bevacizumab (AVASTIN) 600 mg in sodium chloride 0.9% 100 mL chemo infusion, 660 mg, Intravenous, [facility] 1 time, 25 of 26 cycles  Dose modification: 5 mg/kg (original dose 5 mg/kg, Cycle 26, Reason: MD Discretion, Comment: 5 mg/kg original dose)  Administration: 600 mg (6/30/2021), 600 mg (7/14/2021), 600 mg (7/28/2021), 600 mg (6/16/2021), 600 mg (8/11/2021), 655 mg (8/25/2021), 600 mg (9/8/2021), 600 mg (9/22/2021), 600 mg (10/6/2021), 600 mg (10/20/2021), 600 mg (11/3/2021), 655 mg (12/1/2021), 600 mg (12/15/2021), 600 mg (11/17/2021), 600 mg (12/28/2021), 600 mg (5/11/2022), 600 mg (3/9/2022), 600 mg (2/23/2022), 600 mg (2/2/2022), 600 mg (1/19/2022), 600 mg (4/20/2022), 680 mg (4/6/2022), 600 mg (3/23/2022), 680 mg (10/5/2022), 680 mg (10/19/2022)  irinotecan (CAMPTOSAR) 440 mg in sodium chloride 0.9% 500 mL chemo infusion, 446 mg, Intravenous, [facility] 1 time, 27 of 28 cycles  Dose modification: 180 mg/m2 (original dose 180 mg/m2, Cycle 24)  Administration: 440 mg (6/16/2021), 440 mg (6/30/2021), 440 mg (7/14/2021), 440 mg (7/28/2021), 440 mg (8/11/2021), 444 mg (8/25/2021), 440 mg (9/8/2021), 440 mg (9/22/2021), 440 mg (10/6/2021), 440 mg (10/20/2021), 440 mg (11/3/2021), 440 mg (12/1/2021), 440 mg (12/15/2021), 440 mg (11/17/2021), 440 mg (12/28/2021), 440 mg (5/11/2022), 440 mg (3/9/2022), 440 mg (2/23/2022), 440 mg (2/2/2022), 440 mg (1/19/2022), 440 mg (4/20/2022), 440 mg (4/6/2022), 440 mg (3/24/2022), 440 mg (6/1/2022), 440 mg (6/15/2022), 440 mg (10/19/2022), 440 mg (10/5/2022)              Past Medical  History:   Diagnosis Date    Anxiety     Depression     FH: ovarian cancer 3/16/2020    Hx of psychiatric care     Effexor, Paxil, Lexapro, Zoloft, Wellbutrin, Trazodone Buspar    Hyperthyroidism     Hypothyroid     Kidney calculi     Malignant neoplasm of sigmoid colon 3/16/2020    Menorrhagia     Multinodular goiter 2012    Palpitation     Psychiatric problem     Venous insufficiency        Past Surgical History:   Procedure Laterality Date     SECTION, CLASSIC      x3    COLONOSCOPY N/A 2020    Procedure: COLONOSCOPY;  Surgeon: Shane Parker MD;  Location: [facility] ENDO;  Service: Endoscopy;  Laterality: N/A;    COLONOSCOPY N/A 2022    Procedure: COLONOSCOPY;  Surgeon: Shane Parker MD;  Location: [facility] ENDO;  Service: Endoscopy;  Laterality: N/A;    CYSTOSCOPY W/ URETERAL STENT PLACEMENT Bilateral 3/25/2020    Procedure: CYSTOSCOPY, WITH URETERAL STENT INSERTION;  Surgeon: Claudio Tyson MD;  Location: [facility] OR [address] [place] [address];  Service: Urology;  Laterality: Bilateral;    INSERTION OF TUNNELED CENTRAL VENOUS CATHETER (CVC) WITH SUBCUTANEOUS PORT N/A 2020    Procedure: [id number];  Surgeon: [facility] Provider;  Location: [facility] OR [address] [place] [address];  Service: Radiology;  Laterality: N/A;  Room 189/Cindy    LAPAROSCOPIC SIGMOIDECTOMY N/A 3/25/2020    Procedure: COLECTOMY, SIGMOID, LAPAROSCOPIC, flex sig, ERAS high;  Surgeon: Silvio Man MD;  Location: [facility] OR [address] [place] [address];  Service: Colon and Rectal;  Laterality: N/A;    MOBILIZATION OF SPLENIC FLEXURE  3/25/2020    Procedure: MOBILIZATION, SPLENIC FLEXURE;  Surgeon: Silvio Man MD;  [facility];  Service: Colon and Rectal;;    tonsillectomy      TOTAL ABDOMINAL HYSTERECTOMY W/ BILATERAL SALPINGOOPHORECTOMY N/A 3/25/2020    Procedure: HYSTERECTOMY, TOTAL, ABDOMINAL, WITH BILATERAL SALPINGO-OOPHORECTOMY;  Surgeon: Keron Brady MD;  Location: [facility] OR [address] [place] [address];  Service: Oncology;  Laterality: N/A;        Family History   Problem Relation Age of Onset    Heart disease Father     Diabetes Mother 65    Drug abuse Brother     Drug abuse Brother     Cancer Maternal Aunt         lung cancer    Cancer Maternal Grandmother         stomach cancer- started in ovaries    Ovarian cancer Maternal Grandmother         stomach cancer- started in ovaries    Colon cancer Paternal Uncle     Cancer Paternal Uncle     Ovarian cancer Maternal Aunt     Ovarian cancer Maternal Aunt     Ovarian cancer Cousin         mother had ovarian cancer    Drug abuse Son     Drug abuse Son         clean/sober since 2012    Colon cancer Son     No Known Problems Daughter     Uterine cancer Neg Hx     Breast cancer Neg Hx        Social History     Socioeconomic History    Marital status:     Number of children: 3   Tobacco Use    Smoking status: Never    Smokeless tobacco: Never   Substance and Sexual Activity    Alcohol use: Yes     Comment: occasionally- twice monthly    Drug use: Never    Sexual activity: Yes     Partners: Male     Birth control/protection: See Surgical Hx   Social History Narrative        2 adult sons, 1 daughter     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   Eyes:  Negative for visual disturbance.   Cardiovascular:  Negative for chest pain.   Respiratory:  Positive for shortness of breath. Negative for cough.    Hematologic/Lymphatic: Negative for adenopathy.   Skin:  Negative for rash.   Musculoskeletal:  Positive for back pain.   Gastrointestinal:  Positive for nausea. Negative for abdominal pain and diarrhea.   Genitourinary:  Negative for frequency.   Neurological:  Positive for  "headaches.   Psychiatric/Behavioral:  The patient is nervous/anxious.    All other systems reviewed and are negative.         Objective:     Vitals:    10/31/22 1352   BP: 130/80   BP Location: Right arm   Patient Position: Sitting   BP Method: Large (Manual)   Pulse: 78   Resp: 16   Temp: 97.5 °F (36.4 °C)   TempSrc: Temporal   SpO2: 98%   Weight: 133 kg (293 lb 3.4 oz)   Height: 5' 6" (1.676 m)        Physical Exam  Vitals reviewed.   Constitutional:       General: She is not in acute distress.     Appearance: She is obese.   HENT:      Head: Normocephalic and atraumatic.      Mouth/Throat:      Pharynx: Oropharynx is clear.   Eyes:      Conjunctiva/sclera: Conjunctivae normal.   Cardiovascular:      Rate and Rhythm: Normal rate and regular rhythm.      Pulses: Normal pulses.      Heart sounds: Normal heart sounds.   Pulmonary:      Effort: Pulmonary effort is normal. No respiratory distress.      Breath sounds: No wheezing.   Abdominal:      General: Bowel sounds are normal.      Palpations: Abdomen is soft.      Tenderness: There is abdominal tenderness.   Musculoskeletal:      Cervical back: Neck supple.      Right lower leg: No edema.      Left lower leg: No edema.   Lymphadenopathy:      Cervical: No cervical adenopathy.      Upper Body:      Right upper body: No supraclavicular or axillary adenopathy.      Left upper body: No supraclavicular or axillary adenopathy.   Skin:     General: Skin is warm and dry.      Capillary Refill: Capillary refill takes less than 2 seconds.   Neurological:      Mental Status: She is alert and oriented to person, place, and time.   Psychiatric:         Behavior: Behavior normal.         Thought Content: Thought content normal.           Current Outpatient Medications on File Prior to Visit   Medication Sig    acetaminophen (TYLENOL) 500 MG tablet Take 2 tablets (1,000 mg total) by mouth every 8 (eight) hours.    buPROPion (WELLBUTRIN SR) 150 MG TBSR 12 hr tablet Take 1 tablet " (150 mg total) by mouth 2 (two) times daily.    busPIRone (BUSPAR) 10 MG tablet Take 1 tablet (10 mg total) by mouth 2 (two) times daily.    duke's soln (benadryl 30 mL, mylanta 30 mL, LIDOcaine 30 mL, nystatin 30 mL) 120mL 5 ml swish and swallow every 4 hours as needed for mouth pain/ulcers    granisetron (SANCUSO) 3.1 mg/24 hour Place 1 patch (3.1 mg total) onto the skin every 7 days AS DIRECTED.    Lactobacillus rhamnosus GG (CULTURELLE) 10 billion cell capsule Take 1 capsule by mouth once daily.    lactulose (CHRONULAC) 10 gram/15 mL solution Take 30 mLs (20 g total) by mouth daily as needed (constipation).    levothyroxine (SYNTHROID) 200 MCG tablet Take 1 tablet (200 mcg total) by mouth once daily.    LORazepam (ATIVAN) 1 MG tablet Take 1 tablet (1 mg total) by mouth every 12 (twelve) hours as needed for Anxiety (nausea).    multivitamin (THERAGRAN) per tablet Take 1 tablet by mouth once daily.    olmesartan (BENICAR) 20 MG tablet Take 1 tablet (20 mg total) by mouth once daily.    ondansetron (ZOFRAN-ODT) 8 MG TbDL Take 1 tablet (8 mg total) by mouth every 8 (eight) hours as needed.    promethazine (PHENERGAN) 12.5 MG Tab Take 1 to 2 tablets by mouth every 6 hours as needed for nausea persistent despite zofran    senna-docusate 8.6-50 mg (PERICOLACE) 8.6-50 mg per tablet Take 2 tablets by mouth 2 (two) times daily.    traMADoL (ULTRAM) 50 mg tablet Take 1 tablet (50 mg total) by mouth 3 (three) times daily as needed for Pain.    tamsulosin (FLOMAX) 0.4 mg Cap Take 1 capsule (0.4 mg total) by mouth once daily. for 14 days (Patient not taking: Reported on 6/16/2022)    traZODone (DESYREL) 50 MG tablet Take 1 tablet (50 mg total) by mouth nightly.    [DISCONTINUED] LIDOcaine-prilocaine (EMLA) cream Apply topically as needed (30 to 60 min prior chemo or port use).    [DISCONTINUED] sulfamethoxazole-trimethoprim 800-160mg (BACTRIM DS) 800-160 mg Tab Take 1 tablet by mouth 2 (two) times daily.     No current  facility-administered medications on file prior to visit.       CBC:  Lab Results   Component Value Date    WBC 6.66 10/31/2022    HGB 14.6 10/31/2022    HCT 44.5 10/31/2022    MCV 87 10/31/2022     10/31/2022     ANC = 3.6    CMP:  Sodium   Date Value Ref Range Status   10/31/2022 139 136 - 145 mmol/L Final     Potassium   Date Value Ref Range Status   10/31/2022 4.3 3.5 - 5.1 mmol/L Final     Chloride   Date Value Ref Range Status   10/31/2022 109 95 - 110 mmol/L Final     CO2   Date Value Ref Range Status   10/31/2022 23 23 - 29 mmol/L Final     Glucose   Date Value Ref Range Status   10/31/2022 111 (H) 70 - 110 mg/dL Final     BUN   Date Value Ref Range Status   10/31/2022 10 6 - 20 mg/dL Final     Creatinine   Date Value Ref Range Status   10/31/2022 0.9 0.5 - 1.4 mg/dL Final     Calcium   Date Value Ref Range Status   10/31/2022 10.7 (H) 8.7 - 10.5 mg/dL Final     Total Protein   Date Value Ref Range Status   10/31/2022 7.3 6.0 - 8.4 g/dL Final     Albumin   Date Value Ref Range Status   10/31/2022 3.7 3.5 - 5.2 g/dL Final     Total Bilirubin   Date Value Ref Range Status   10/31/2022 0.3 0.1 - 1.0 mg/dL Final     Comment:     For infants and newborns, interpretation of results should be based  on gestational age, weight and in agreement with clinical  observations.    Premature Infant recommended reference ranges:  Up to 24 hours.............<8.0 mg/dL  Up to 48 hours............<12.0 mg/dL  3-5 days..................<15.0 mg/dL  6-29 days.................<15.0 mg/dL       Alkaline Phosphatase   Date Value Ref Range Status   10/31/2022 205 (H) 55 - 135 U/L Final     AST   Date Value Ref Range Status   10/31/2022 36 10 - 40 U/L Final     ALT   Date Value Ref Range Status   10/31/2022 58 (H) 10 - 44 U/L Final     Anion Gap   Date Value Ref Range Status   10/31/2022 7 (L) 8 - 16 mmol/L Final     eGFR if    Date Value Ref Range Status   06/15/2022 >60 >60 mL/min/1.73 m^2 Final   06/15/2022  >60 >60 mL/min/1.73 m^2 Final     eGFR if non    Date Value Ref Range Status   06/15/2022 >60 >60 mL/min/1.73 m^2 Final     Comment:     Calculation used to obtain the estimated glomerular filtration  rate (eGFR) is the CKD-EPI equation.      06/15/2022 >60 >60 mL/min/1.73 m^2 Final     Comment:     Calculation used to obtain the estimated glomerular filtration  rate (eGFR) is the CKD-EPI equation.        Magnesium:  2.1    US Soft Tissue Head Neck Thyroid  Narrative: EXAMINATION:  US SOFT TISSUE HEAD NECK THYROID    CLINICAL HISTORY:  Nontoxic multinodular goiter    TECHNIQUE:  Ultrasound of the thyroid and cervical lymph nodes was performed.    COMPARISON:  03/04/2021    FINDINGS:  The right lobe of thyroid gland measures 4.0 x 1.3 x 1.1 cm in size.  The left lobe the thyroid gland measures 3.4 x 1.9 x 0.9cm in size.  This gives an approximate volume of on the right of 2.9cc and an approximate volume on the left of 1.7 cc for a total approximate volume of 4.6 cc.    The thyroid gland is noted to be small, please correlate for history of prior injury, ablation, or thyroiditis.  Hyperechogenicity is noted suggestive of calcification in the mid right lobe of the thyroid gland unchanged since the prior suggestive a calcified nodule of 7 mm    Within the left thyroid gland a hypoechoic nodule is noted taller than wide of 11 mm that is hypoechoic without obvious calcifications relatively well-circumscribed.  This is unchanged when measured in a similar manner to on 03/04/2021  Impression: 1. Hypoechoic solid thyroid nodule unchanged since 03/04/2021 in the left lobe of the thyroid gland.  2. Small heterogeneous thyroid gland, please correlate for history of prior thyroid injury or thyroiditis    Electronically signed by: Silvio Earl MD  Date:    09/12/2022  Time:    15:43       All pertinent labs and imaging reviewed.    Assessment:       1. Malignant neoplasm of sigmoid colon         Plan:      Malignant neoplasm of sigmoid colon  -     prochlorperazine (COMPAZINE) 10 MG tablet; Take 1 tablet (10 mg total) by mouth every 6 (six) hours as needed.  Dispense: 30 tablet; Refill: 6  -     Magnesium; Future; Expected date: 10/31/2022    Chemotherapy-induced nausea    Transaminitis    Body mass index (BMI) 45.0-49.9, adult    Hypercalcemia    Other orders  -     0.9%  NaCl infusion  -     sodium chloride 0.9% flush 10 mL  -     heparin, porcine (PF) 100 unit/mL injection flush 500 Units  -     alteplase injection 2 mg  -     LORazepam injection 1 mg  -     promethazine (PHENERGAN) 6.25 mg in dextrose 5 % 100 mL IVPB  -     palonosetron 0.25mg/dexAMETHasone 20mg in NS IVPB  -     bevacizumab (AVASTIN) 5 mg/kg = 680 mg in sodium chloride 0.9% 127.2 mL chemo infusion  -     irinotecan (CAMPTOSAR) 440 mg in sodium chloride 0.9% 522 mL chemo infusion  -     leucovorin calcium 400 mg/m2 = 1,010 mg in dextrose 5 % 250 mL infusion  -     fluorouracil (ADRUCIL) 2,400 mg/m2 = 6,050 mg in sodium chloride 0.9% 240 mL chemo infusion  -     atropine injection 0.4 mg  -     sodium chloride 0.9% 250 mL flush bag  -     sodium chloride 0.9% flush 10 mL  -     heparin, porcine (PF) 100 unit/mL injection flush 500 Units  -     alteplase injection 2 mg  -     sodium chloride 0.9% flush 10 mL  -     heparin, porcine (PF) 100 unit/mL injection flush 500 Units  -     alteplase injection 2 mg  -     0.9%  NaCl infusion  -     sodium chloride 0.9% flush 10 mL  -     heparin, porcine (PF) 100 unit/mL injection flush 500 Units  -     alteplase injection 2 mg     Proceed with C28 of FOLFIRI+ Avastin.  1000 ml NS over 2 hours on Friday 11/04 & again on Monday, 11/07.  Trial of Compazine starting Thursday night before pump off & every 6 hours x 3 days.  F/U with Dr. Santo in 2 weeks with labs prior for evaluation prior to next cycle of Tx.         Route Chart for Scheduling    Med Onc Chart Routing      Follow up with physician 2 weeks.  f/u in 2 weeks with Dr. Santo with CBC, CMP (standing orders) & Magnesium prior   Follow up with T[facility]O    Infusion scheduling note add fluids on for Friday 11/04 before pump off & again on Monday, 11/07   Injection scheduling note    Labs CBC, CMP and magnesium   Lab interval:     Imaging    Pharmacy appointment No pharmacy appointment needed      Other referrals No additional referrals needed         Treatment Plan Information   OP COLORECTAL FOLFIRI + BEVACIZUMAB Q2W   Marah Santo MD   Upcoming Treatment Dates - OP COLORECTAL FOLFIRI + BEVACIZUMAB Q2W    11/2/2022       Pre-Medications       LORazepam injection 1 mg       promethazine (PHENERGAN) 6.25 mg in dextrose 5 % 100 mL IVPB       palonosetron 0.25mg/dexAMETHasone 20mg in NS IVPB       Chemotherapy       bevacizumab (AVASTIN) 5 mg/kg = 680 mg in sodium chloride 0.9% 127.2 mL chemo infusion       irinotecan (CAMPTOSAR) 440 mg in sodium chloride 0.9% 522 mL chemo infusion       leucovorin calcium 400 mg/m2 = 1,010 mg in dextrose 5 % 250 mL infusion       fluorouracil (ADRUCIL) 2,400 mg/m2 = 6,050 mg in sodium chloride 0.9% 240 mL chemo infusion       Supportive Care       atropine injection 0.4 mg    Supportive Plan Information  IV FLUIDS AND ELECTROLYTES   Brayden Solo MD   Upcoming Treatment Dates - IV FLUIDS AND ELECTROLYTES    No upcoming days in selected categories.    Therapy Plan Information  PORT FLUSH  Flushes  heparin, porcine (PF) 100 unit/mL injection flush 500 Units  500 Units, Intravenous, Every visit  sodium chloride 0.9% flush 10 mL  10 mL, Intravenous, Every visit        Answers submitted by the patient for this visit:  Review of Systems Questionnaire (Submitted on 10/31/2022)  appetite change : No  unexpected weight change: No  mouth sores: Yes  visual disturbance: No  cough: No  shortness of breath: Yes  chest pain: No  abdominal pain: No  diarrhea: No  frequency: No  back pain: Yes  rash: No  headaches: Yes  adenopathy:  No  nervous/ anxious: Yes

## 2022-11-01 ENCOUNTER — DOCUMENTATION ONLY (OUTPATIENT)
Dept: INFUSION THERAPY | Facility: HOSPITAL | Age: 52
End: 2022-11-01
Payer: MEDICAID

## 2022-11-01 NOTE — PROGRESS NOTES
SW followed up with pt before her clinic visit. Pt was here with her daughter today. Pt said she is working on completing her social security disability application. She said it has been overwhelming and it has been a lot of information to gather for the application. SW offered to assist with completion of the application to provide support for pt. She said she was alright and is trying to finish it this week. SW and pt discussed focusing on the important information and Soc. Sec will be accessing her medical records when she signs a release of information for social security.   Pt has received unemployment and still gets SNAP. She does have an upcoming job interview. There is a lot of uncertainty about ability to work. SW provided emotional support for pt. No other needs noted. Will continue to explore available resources for pt.      Radha Giordano, MEDINA

## 2022-11-02 ENCOUNTER — INFUSION (OUTPATIENT)
Dept: INFUSION THERAPY | Facility: HOSPITAL | Age: 52
End: 2022-11-02
Attending: INTERNAL MEDICINE
Payer: MEDICAID

## 2022-11-02 ENCOUNTER — TELEPHONE (OUTPATIENT)
Dept: PHARMACY | Facility: CLINIC | Age: 52
End: 2022-11-02
Payer: MEDICAID

## 2022-11-02 ENCOUNTER — DOCUMENTATION ONLY (OUTPATIENT)
Dept: INFUSION THERAPY | Facility: HOSPITAL | Age: 52
End: 2022-11-02

## 2022-11-02 VITALS
DIASTOLIC BLOOD PRESSURE: 90 MMHG | HEIGHT: 66 IN | TEMPERATURE: 99 F | SYSTOLIC BLOOD PRESSURE: 162 MMHG | RESPIRATION RATE: 16 BRPM | BODY MASS INDEX: 47.09 KG/M2 | WEIGHT: 293 LBS | HEART RATE: 82 BPM

## 2022-11-02 DIAGNOSIS — C77.2 METASTASIS TO INTESTINAL LYMPH NODE: Primary | ICD-10-CM

## 2022-11-02 DIAGNOSIS — C18.7 MALIGNANT NEOPLASM OF SIGMOID COLON: ICD-10-CM

## 2022-11-02 PROCEDURE — 96416 CHEMO PROLONG INFUSE W/PUMP: CPT | Mod: PN

## 2022-11-02 PROCEDURE — 63600175 PHARM REV CODE 636 W HCPCS: Mod: JG,PN | Performed by: NURSE PRACTITIONER

## 2022-11-02 PROCEDURE — 25000003 PHARM REV CODE 250: Mod: PN | Performed by: NURSE PRACTITIONER

## 2022-11-02 PROCEDURE — 96367 TX/PROPH/DG ADDL SEQ IV INF: CPT | Mod: PN

## 2022-11-02 PROCEDURE — 96417 CHEMO IV INFUS EACH ADDL SEQ: CPT | Mod: PN

## 2022-11-02 PROCEDURE — 96413 CHEMO IV INFUSION 1 HR: CPT | Mod: PN

## 2022-11-02 PROCEDURE — 96415 CHEMO IV INFUSION ADDL HR: CPT | Mod: PN

## 2022-11-02 PROCEDURE — 96368 THER/DIAG CONCURRENT INF: CPT | Mod: PN

## 2022-11-02 PROCEDURE — 96375 TX/PRO/DX INJ NEW DRUG ADDON: CPT | Mod: PN

## 2022-11-02 RX ORDER — SODIUM CHLORIDE 0.9 % (FLUSH) 0.9 %
10 SYRINGE (ML) INJECTION
Status: DISCONTINUED | OUTPATIENT
Start: 2022-11-02 | End: 2022-11-02 | Stop reason: HOSPADM

## 2022-11-02 RX ORDER — LORAZEPAM 2 MG/ML
1 INJECTION INTRAMUSCULAR
Status: COMPLETED | OUTPATIENT
Start: 2022-11-02 | End: 2022-11-02

## 2022-11-02 RX ORDER — ATROPINE SULFATE 0.4 MG/ML
0.4 INJECTION, SOLUTION ENDOTRACHEAL; INTRAMEDULLARY; INTRAMUSCULAR; INTRAVENOUS; SUBCUTANEOUS ONCE AS NEEDED
Status: DISCONTINUED | OUTPATIENT
Start: 2022-11-02 | End: 2022-11-02 | Stop reason: HOSPADM

## 2022-11-02 RX ADMIN — LEUCOVORIN CALCIUM 1010 MG: 350 INJECTION, POWDER, LYOPHILIZED, FOR SOLUTION INTRAMUSCULAR; INTRAVENOUS at 01:11

## 2022-11-02 RX ADMIN — DEXAMETHASONE SODIUM PHOSPHATE 0.25 MG: 10 INJECTION, SOLUTION INTRAMUSCULAR; INTRAVENOUS at 12:11

## 2022-11-02 RX ADMIN — FLUOROURACIL 6050 MG: 50 INJECTION, SOLUTION INTRAVENOUS at 03:11

## 2022-11-02 RX ADMIN — PROMETHAZINE HYDROCHLORIDE 6.25 MG: 25 INJECTION INTRAMUSCULAR; INTRAVENOUS at 12:11

## 2022-11-02 RX ADMIN — SODIUM CHLORIDE: 0.9 INJECTION, SOLUTION INTRAVENOUS at 12:11

## 2022-11-02 RX ADMIN — LORAZEPAM 1 MG: 2 INJECTION INTRAMUSCULAR; INTRAVENOUS at 12:11

## 2022-11-02 RX ADMIN — SODIUM CHLORIDE 440 MG: 0.9 INJECTION, SOLUTION INTRAVENOUS at 01:11

## 2022-11-02 RX ADMIN — BEVACIZUMAB 680 MG: 400 INJECTION, SOLUTION INTRAVENOUS at 01:11

## 2022-11-02 NOTE — PLAN OF CARE
Pt tolerated FOLFIRI well today. Reviewed follow-up appointments. All questions were answered, ambulated independently at d/c.

## 2022-11-02 NOTE — TELEPHONE ENCOUNTER
DOCUMENTATION ONLY  [place] 3.1mg/24hr patches  Approval date: 11/2/2022 to 11/2/2023  Case ID# [id number]

## 2022-11-02 NOTE — PROGRESS NOTES
Oncology Nutrition   Chemotherapy Infusion Visit    Nutrition Follow Up   RD met w/ pt at chairside during infusion tx. LCSW, Radha, recently went to visit pt. Pt recently unemployed and receiving SNAP benefits. RD offered pt [employer] ([employer] Pt agreed and RD completed emergency box form. Food order filled by this RD- will provide to patient at end of infusion today.     Wt Readings from Last 10 Encounters:   11/02/22 133 kg (293 lb 3.4 oz)   10/31/22 133 kg (293 lb 3.4 oz)   10/19/22 133.3 kg (293 lb 14 oz)   10/17/22 133.3 kg (293 lb 14 oz)   10/05/22 132.7 kg (292 lb 8.8 oz)   10/03/22 132.7 kg (292 lb 8.8 oz)   08/15/22 131.3 kg (289 lb 7.4 oz)   06/21/22 131.1 kg (289 lb)   06/17/22 131 kg (288 lb 12.8 oz)   06/15/22 134.5 kg (296 lb 8.3 oz)       All other nutrition questions/concerns addressed as appropriate. Will continue to monitor prn throughout treatment.     Ciera Richardson, SARAH, LDN  11/02/2022  2:25 PM

## 2022-11-04 ENCOUNTER — INFUSION (OUTPATIENT)
Dept: INFUSION THERAPY | Facility: HOSPITAL | Age: 52
End: 2022-11-04
Attending: INTERNAL MEDICINE
Payer: MEDICAID

## 2022-11-04 VITALS
HEART RATE: 71 BPM | RESPIRATION RATE: 16 BRPM | TEMPERATURE: 99 F | DIASTOLIC BLOOD PRESSURE: 80 MMHG | WEIGHT: 293 LBS | HEIGHT: 66 IN | BODY MASS INDEX: 47.09 KG/M2 | SYSTOLIC BLOOD PRESSURE: 127 MMHG

## 2022-11-04 DIAGNOSIS — R11.0 NAUSEA: Primary | ICD-10-CM

## 2022-11-04 DIAGNOSIS — C18.7 MALIGNANT NEOPLASM OF SIGMOID COLON: ICD-10-CM

## 2022-11-04 DIAGNOSIS — C77.2 METASTASIS TO INTESTINAL LYMPH NODE: ICD-10-CM

## 2022-11-04 PROCEDURE — 25000003 PHARM REV CODE 250: Mod: PN | Performed by: NURSE PRACTITIONER

## 2022-11-04 PROCEDURE — 96361 HYDRATE IV INFUSION ADD-ON: CPT | Mod: PN

## 2022-11-04 PROCEDURE — 63600175 PHARM REV CODE 636 W HCPCS: Mod: PN | Performed by: NURSE PRACTITIONER

## 2022-11-04 PROCEDURE — 96360 HYDRATION IV INFUSION INIT: CPT | Mod: PN

## 2022-11-04 PROCEDURE — A4216 STERILE WATER/SALINE, 10 ML: HCPCS | Mod: PN | Performed by: NURSE PRACTITIONER

## 2022-11-04 RX ORDER — HEPARIN 100 UNIT/ML
500 SYRINGE INTRAVENOUS
Status: DISCONTINUED | OUTPATIENT
Start: 2022-11-04 | End: 2022-11-04 | Stop reason: HOSPADM

## 2022-11-04 RX ORDER — SODIUM CHLORIDE 9 MG/ML
1000 INJECTION, SOLUTION INTRAVENOUS
Status: COMPLETED | OUTPATIENT
Start: 2022-11-04 | End: 2022-11-04

## 2022-11-04 RX ORDER — SODIUM CHLORIDE 0.9 % (FLUSH) 0.9 %
10 SYRINGE (ML) INJECTION
Status: DISCONTINUED | OUTPATIENT
Start: 2022-11-04 | End: 2022-11-04 | Stop reason: HOSPADM

## 2022-11-04 RX ADMIN — SODIUM CHLORIDE 1000 ML: 0.9 INJECTION, SOLUTION INTRAVENOUS at 11:11

## 2022-11-04 RX ADMIN — Medication 500 UNITS: at 01:11

## 2022-11-04 RX ADMIN — Medication 10 ML: at 01:11

## 2022-11-04 NOTE — PLAN OF CARE
Problem: Adult Inpatient Plan of Care  Goal: Plan of Care Review  Outcome: Ongoing, Progressing  Flowsheets (Taken 11/4/2022 1219)  Plan of Care Reviewed With: patient  Goal: Patient-Specific Goal (Individualized)  Outcome: Ongoing, Progressing  Flowsheets (Taken 11/4/2022 1219)  Anxieties, Fears or Concerns: getting assistance, lost job recently  Individualized Care Needs: recliner, blanket, water  Patient-Specific Goals (Include Timeframe): no s/s of rx during tx     Problem: Fatigue  Goal: Improved Activity Tolerance  Outcome: Ongoing, Progressing  Intervention: Promote Improved Energy  Flowsheets (Taken 11/4/2022 1219)  Activity Management:   Ambulated in reyes - L4   Up in chair - L3   Pt tolerated IVF and pump d/c well.  No adverse reaction noted.   PAD flushed and de-accessed per protocol.  Patient left clinic in no acute distress.

## 2022-11-10 NOTE — TELEPHONE ENCOUNTER
She is seeing Dr. Barcenas, maybe because my schedule is full? Could you please look into this and let her know and put her back on my schedule if there are any concerns.  Thanks  Detail Level: Simple

## 2022-11-14 ENCOUNTER — LAB VISIT (OUTPATIENT)
Dept: LAB | Facility: HOSPITAL | Age: 52
End: 2022-11-14
Attending: INTERNAL MEDICINE
Payer: MEDICAID

## 2022-11-14 ENCOUNTER — OFFICE VISIT (OUTPATIENT)
Dept: HEMATOLOGY/ONCOLOGY | Facility: CLINIC | Age: 52
End: 2022-11-14
Payer: COMMERCIAL

## 2022-11-14 ENCOUNTER — OFFICE VISIT (OUTPATIENT)
Dept: PSYCHIATRY | Facility: CLINIC | Age: 52
End: 2022-11-14
Payer: MEDICAID

## 2022-11-14 VITALS
WEIGHT: 293 LBS | SYSTOLIC BLOOD PRESSURE: 116 MMHG | DIASTOLIC BLOOD PRESSURE: 18 MMHG | OXYGEN SATURATION: 97 % | HEART RATE: 113 BPM | BODY MASS INDEX: 47.09 KG/M2 | RESPIRATION RATE: 18 BRPM | HEIGHT: 66 IN

## 2022-11-14 DIAGNOSIS — F33.1 MODERATE EPISODE OF RECURRENT MAJOR DEPRESSIVE DISORDER: Primary | ICD-10-CM

## 2022-11-14 DIAGNOSIS — C80.1 ANXIETY ASSOCIATED WITH CANCER DIAGNOSIS: ICD-10-CM

## 2022-11-14 DIAGNOSIS — C18.7 MALIGNANT NEOPLASM OF SIGMOID COLON: ICD-10-CM

## 2022-11-14 DIAGNOSIS — C77.2 METASTASIS TO INTESTINAL LYMPH NODE: ICD-10-CM

## 2022-11-14 DIAGNOSIS — C77.9 SECONDARY ADENOCARCINOMA OF LYMPH NODE: ICD-10-CM

## 2022-11-14 DIAGNOSIS — E83.52 HYPERCALCEMIA: ICD-10-CM

## 2022-11-14 DIAGNOSIS — F41.1 ANXIETY ASSOCIATED WITH CANCER DIAGNOSIS: ICD-10-CM

## 2022-11-14 DIAGNOSIS — C18.7 MALIGNANT NEOPLASM OF SIGMOID COLON: Primary | ICD-10-CM

## 2022-11-14 LAB
ALBUMIN SERPL BCP-MCNC: 3.8 G/DL (ref 3.5–5.2)
ALP SERPL-CCNC: 214 U/L (ref 55–135)
ALT SERPL W/O P-5'-P-CCNC: 55 U/L (ref 10–44)
ANION GAP SERPL CALC-SCNC: 7 MMOL/L (ref 8–16)
AST SERPL-CCNC: 36 U/L (ref 10–40)
BASOPHILS # BLD AUTO: 0.06 K/UL (ref 0–0.2)
BASOPHILS NFR BLD: 1 % (ref 0–1.9)
BILIRUB SERPL-MCNC: 0.4 MG/DL (ref 0.1–1)
BUN SERPL-MCNC: 15 MG/DL (ref 6–20)
CALCIUM SERPL-MCNC: 11 MG/DL (ref 8.7–10.5)
CHLORIDE SERPL-SCNC: 110 MMOL/L (ref 95–110)
CO2 SERPL-SCNC: 22 MMOL/L (ref 23–29)
CREAT SERPL-MCNC: 0.9 MG/DL (ref 0.5–1.4)
DIFFERENTIAL METHOD: ABNORMAL
EOSINOPHIL # BLD AUTO: 0.4 K/UL (ref 0–0.5)
EOSINOPHIL NFR BLD: 5.9 % (ref 0–8)
ERYTHROCYTE [DISTWIDTH] IN BLOOD BY AUTOMATED COUNT: 16.5 % (ref 11.5–14.5)
EST. GFR  (NO RACE VARIABLE): >60 ML/MIN/1.73 M^2
GLUCOSE SERPL-MCNC: 107 MG/DL (ref 70–110)
HCT VFR BLD AUTO: 45.6 % (ref 37–48.5)
HGB BLD-MCNC: 15 G/DL (ref 12–16)
IMM GRANULOCYTES # BLD AUTO: 0.01 K/UL (ref 0–0.04)
IMM GRANULOCYTES NFR BLD AUTO: 0.2 % (ref 0–0.5)
LYMPHOCYTES # BLD AUTO: 1.6 K/UL (ref 1–4.8)
LYMPHOCYTES NFR BLD: 27.2 % (ref 18–48)
MAGNESIUM SERPL-MCNC: 2.1 MG/DL (ref 1.6–2.6)
MCH RBC QN AUTO: 28.5 PG (ref 27–31)
MCHC RBC AUTO-ENTMCNC: 32.9 G/DL (ref 32–36)
MCV RBC AUTO: 87 FL (ref 82–98)
MONOCYTES # BLD AUTO: 0.7 K/UL (ref 0.3–1)
MONOCYTES NFR BLD: 11.9 % (ref 4–15)
NEUTROPHILS # BLD AUTO: 3.2 K/UL (ref 1.8–7.7)
NEUTROPHILS NFR BLD: 53.8 % (ref 38–73)
NRBC BLD-RTO: 0 /100 WBC
PLATELET # BLD AUTO: 300 K/UL (ref 150–450)
PMV BLD AUTO: 9.7 FL (ref 9.2–12.9)
POTASSIUM SERPL-SCNC: 4.6 MMOL/L (ref 3.5–5.1)
PROT SERPL-MCNC: 7.4 G/DL (ref 6–8.4)
RBC # BLD AUTO: 5.26 M/UL (ref 4–5.4)
SODIUM SERPL-SCNC: 139 MMOL/L (ref 136–145)
WBC # BLD AUTO: 5.89 K/UL (ref 3.9–12.7)

## 2022-11-14 PROCEDURE — 3074F SYST BP LT 130 MM HG: CPT | Mod: CPTII,S$GLB,, | Performed by: INTERNAL MEDICINE

## 2022-11-14 PROCEDURE — 85025 COMPLETE CBC W/AUTO DIFF WBC: CPT | Mod: PN | Performed by: INTERNAL MEDICINE

## 2022-11-14 PROCEDURE — 1160F PR REVIEW ALL MEDS BY PRESCRIBER/CLIN PHARMACIST DOCUMENTED: ICD-10-PCS | Mod: CPTII,S$GLB,, | Performed by: INTERNAL MEDICINE

## 2022-11-14 PROCEDURE — 1160F RVW MEDS BY RX/DR IN RCRD: CPT | Mod: CPTII,S$GLB,, | Performed by: INTERNAL MEDICINE

## 2022-11-14 PROCEDURE — 3078F PR MOST RECENT DIASTOLIC BLOOD PRESSURE < 80 MM HG: ICD-10-PCS | Mod: CPTII,S$GLB,, | Performed by: INTERNAL MEDICINE

## 2022-11-14 PROCEDURE — 99999 PR PBB SHADOW E&M-EST. PATIENT-LVL V: CPT | Mod: PBBFAC,,, | Performed by: INTERNAL MEDICINE

## 2022-11-14 PROCEDURE — 4010F ACE/ARB THERAPY RXD/TAKEN: CPT | Mod: CPTII,S$GLB,, | Performed by: INTERNAL MEDICINE

## 2022-11-14 PROCEDURE — 36415 COLL VENOUS BLD VENIPUNCTURE: CPT | Mod: PN | Performed by: INTERNAL MEDICINE

## 2022-11-14 PROCEDURE — 3074F PR MOST RECENT SYSTOLIC BLOOD PRESSURE < 130 MM HG: ICD-10-PCS | Mod: CPTII,S$GLB,, | Performed by: INTERNAL MEDICINE

## 2022-11-14 PROCEDURE — 90834 PR PSYCHOTHERAPY W/PATIENT, 45 MIN: ICD-10-PCS | Mod: AH,HB,, | Performed by: PSYCHOLOGIST

## 2022-11-14 PROCEDURE — 99215 PR OFFICE/OUTPT VISIT, EST, LEVL V, 40-54 MIN: ICD-10-PCS | Mod: S$GLB,,, | Performed by: INTERNAL MEDICINE

## 2022-11-14 PROCEDURE — 90834 PSYTX W PT 45 MINUTES: CPT | Mod: AH,HB,, | Performed by: PSYCHOLOGIST

## 2022-11-14 PROCEDURE — 1159F PR MEDICATION LIST DOCUMENTED IN MEDICAL RECORD: ICD-10-PCS | Mod: CPTII,S$GLB,, | Performed by: INTERNAL MEDICINE

## 2022-11-14 PROCEDURE — 99999 PR PBB SHADOW E&M-EST. PATIENT-LVL I: ICD-10-PCS | Mod: PBBFAC,HB,, | Performed by: PSYCHOLOGIST

## 2022-11-14 PROCEDURE — 3008F BODY MASS INDEX DOCD: CPT | Mod: CPTII,S$GLB,, | Performed by: INTERNAL MEDICINE

## 2022-11-14 PROCEDURE — 3078F DIAST BP <80 MM HG: CPT | Mod: CPTII,S$GLB,, | Performed by: INTERNAL MEDICINE

## 2022-11-14 PROCEDURE — 99999 PR PBB SHADOW E&M-EST. PATIENT-LVL I: CPT | Mod: PBBFAC,HB,, | Performed by: PSYCHOLOGIST

## 2022-11-14 PROCEDURE — 99999 PR PBB SHADOW E&M-EST. PATIENT-LVL V: ICD-10-PCS | Mod: PBBFAC,,, | Performed by: INTERNAL MEDICINE

## 2022-11-14 PROCEDURE — 1159F MED LIST DOCD IN RCRD: CPT | Mod: CPTII,S$GLB,, | Performed by: INTERNAL MEDICINE

## 2022-11-14 PROCEDURE — 99211 OFF/OP EST MAY X REQ PHY/QHP: CPT | Mod: PBBFAC,PN | Performed by: PSYCHOLOGIST

## 2022-11-14 PROCEDURE — 99215 OFFICE O/P EST HI 40 MIN: CPT | Mod: S$GLB,,, | Performed by: INTERNAL MEDICINE

## 2022-11-14 PROCEDURE — 80053 COMPREHEN METABOLIC PANEL: CPT | Mod: PN | Performed by: INTERNAL MEDICINE

## 2022-11-14 PROCEDURE — 3008F PR BODY MASS INDEX (BMI) DOCUMENTED: ICD-10-PCS | Mod: CPTII,S$GLB,, | Performed by: INTERNAL MEDICINE

## 2022-11-14 PROCEDURE — 83735 ASSAY OF MAGNESIUM: CPT | Mod: PN | Performed by: NURSE PRACTITIONER

## 2022-11-14 PROCEDURE — 4010F PR ACE/ARB THEARPY RXD/TAKEN: ICD-10-PCS | Mod: CPTII,S$GLB,, | Performed by: INTERNAL MEDICINE

## 2022-11-14 NOTE — PROGRESS NOTES
PROGRESS NOTE    Subjective:       Patient ID: Gail Mckinnon is a 52 y.o. female.  MRN: [id number]  : 1970    Chief Complaint: Malignant neoplasm of sigmoid colon      History of Present Illness:   Gail Mckinnon is a 52 y.o. female who presents with colon cancer, initially stage III and now with LN recurrence.       She completed adjuvant FOLFOX in 2020. She tolerated only 4 cycles of FOLFOX before she developed an infusion reaction to oxaliplatin in cycle 5 was well as cycle 6. She then completed 6 cycles of infusional 5 FU.     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 She was offered second line therapy with FOLFIRI and Avastin.      She presented to the ED on  with left flank pain. CT renal stone study showed Moderate hydronephrosis on the left secondary to 3 mm calculus at the UPJ.    She had a PET scan mid April that showed possible progression of her disease with      She has been to [facility] for another opinion. No change was recommended in systemic therapy but she was offered surgical removal of the aorta caval nodes.  Recent sans at [facility]  show stable disease.      Surgery done 22. Received 2 more cycle of FOLFIRI without Avastin.     She had repeat imaging at [facility] on 22 that unfortunately showed disease progression since her surgery with multiple RP nodes and one mediastinal node.      Interim history:    She presents for a follow up visit. See prior notes for discussion. She has resumed FOLFIRI and Avastin and is here to obtain clearance for ongoing treatment.     She reports nausea and fatigue for the first few days. She is using compazine and it has been helping significantly.     She reports persistent anxiety, is following up with Dr. Palm.         Oncology History:  Oncology History   Malignant neoplasm of sigmoid colon   3/16/2020 Initial Diagnosis    Malignant neoplasm of  sigmoid colon     3/31/2020 Cancer Staged    Staging form: Colon and Rectum, AJCC 8th Edition  - Clinical stage from 3/31/2020: Stage IIIC (cT4b, cN2a, cM0)       5/6/2020 - 11/17/2020 Chemotherapy    Treatment Summary   Plan Name: OP FOLFOX 6 Q2W  Treatment Goal: Curative  Status: Inactive  Start Date: 5/6/2020  End Date: 11/6/2020  Provider: Dylan Leyva MD  Chemotherapy: fluorouraciL injection 945 mg, 400 mg/m2 = 945 mg, Intravenous, Clinic/[facility] 1 time, 14 of 14 cycles  Administration: 945 mg (5/6/2020), 945 mg (5/20/2020), 945 mg (6/3/2020), 945 mg (6/17/2020), 945 mg (7/29/2020), 945 mg (8/12/2020), 945 mg (8/26/2020), 945 mg (9/9/2020), 945 mg (9/23/2020), 945 mg (10/6/2020), 945 mg (10/21/2020), 945 mg (11/4/2020)  fluorouraciL 2,400 mg/m2 = 5,665 mg in sodium chloride 0.9% 240 mL chemo infusion, 2,400 mg/m2 = 5,665 mg, Intravenous, Over 46 hours, 14 of 14 cycles  Administration: 5,665 mg (5/6/2020), 5,665 mg (5/20/2020), 5,665 mg (6/3/2020), 5,665 mg (6/17/2020), 5,665 mg (7/29/2020), 5,665 mg (8/12/2020), 5,665 mg (8/26/2020), 5,665 mg (9/9/2020), 5,665 mg (9/23/2020), 5,665 mg (10/6/2020), 5,665 mg (10/21/2020), 5,665 mg (11/4/2020)  leucovorin calcium 900 mg in dextrose 5 % 250 mL infusion, 945 mg, Intravenous, Clinic/[facility] 1 time, 14 of 14 cycles  Administration: 900 mg (5/6/2020), 900 mg (5/20/2020), 900 mg (6/3/2020), 900 mg (6/17/2020), 945 mg (6/30/2020), 945 mg (7/20/2020), 945 mg (7/29/2020), 945 mg (8/12/2020), 945 mg (8/26/2020), 945 mg (9/9/2020), 945 mg (9/23/2020), 945 mg (10/6/2020), 945 mg (10/21/2020), 945 mg (11/4/2020)  oxaliplatin (ELOXATIN) 200 mg in dextrose 5 % 500 mL chemo infusion, 201 mg, Intravenous, Clinic/[facility] 1 time, 6 of 6 cycles  Dose modification: 65 mg/m2 (original dose 85 mg/m2, Cycle 6)  Administration: 200 mg (5/6/2020), 200 mg (5/20/2020), 200 mg (6/3/2020), 200 mg (6/17/2020), 201 mg (6/30/2020), 150 mg (7/20/2020)       6/16/2021 -  Chemotherapy    Treatment Summary    Plan Name: OP COLORECTAL FOLFIRI + BEVACIZUMAB Q2W  Treatment Goal: Control  Status: Active  Start Date: 6/16/2021  End Date: 11/4/2022  Provider: Marah Santo MD  Chemotherapy: fluorouraciL injection 990 mg, 400 mg/m2 = 990 mg, Intravenous, [facility] 1 time, 15 of 15 cycles  Administration: 990 mg (6/16/2021), 990 mg (6/30/2021), 990 mg (7/14/2021), 980 mg (7/28/2021), 990 mg (8/11/2021), 990 mg (8/25/2021), 990 mg (9/8/2021), 990 mg (9/22/2021), 990 mg (10/6/2021), 990 mg (10/20/2021), 990 mg (11/3/2021), 990 mg (12/1/2021), 990 mg (12/15/2021), 990 mg (11/17/2021), 990 mg (12/28/2021)  fluorouraciL 2,400 mg/m2 = 5,950 mg in sodium chloride 0.9% 240 mL chemo infusion, 2,400 mg/m2 = 5,950 mg, Intravenous, Over 46 hours, 28 of 28 cycles  Administration: 5,950 mg (6/16/2021), 5,950 mg (6/30/2021), 5,950 mg (7/14/2021), 5,880 mg (7/28/2021), 5,930 mg (8/11/2021), 5,930 mg (8/25/2021), 5,930 mg (9/8/2021), 5,930 mg (9/22/2021), 5,930 mg (10/6/2021), 5,930 mg (10/20/2021), 5,930 mg (11/3/2021), 5,930 mg (12/1/2021), 5,930 mg (12/15/2021), 5,930 mg (11/17/2021), 5,930 mg (12/28/2021), 6,050 mg (5/11/2022), 6,025 mg (3/9/2022), 6,025 mg (2/23/2022), 5,930 mg (2/2/2022), 5,930 mg (1/19/2022), 6,050 mg (4/20/2022), 6,025 mg (4/6/2022), 6,025 mg (3/23/2022), 6,050 mg (6/1/2022), 6,050 mg (6/15/2022), 6,050 mg (10/19/2022), 6,050 mg (10/5/2022), 6,050 mg (11/2/2022)  bevacizumab (AVASTIN) 600 mg in sodium chloride 0.9% 100 mL chemo infusion, 660 mg, Intravenous, [facility] 1 time, 26 of 26 cycles  Dose modification: 5 mg/kg (original dose 5 mg/kg, Cycle 26, Reason: MD Discretion, Comment: 5 mg/kg original dose)  Administration: 600 mg (6/30/2021), 600 mg (7/14/2021), 600 mg (7/28/2021), 600 mg (6/16/2021), 600 mg (8/11/2021), 655 mg (8/25/2021), 600 mg (9/8/2021), 600 mg (9/22/2021), 600 mg (10/6/2021), 600 mg (10/20/2021), 600 mg (11/3/2021), 655 mg (12/1/2021), 600 mg (12/15/2021), 600 mg (11/17/2021), 600 mg  (12/28/2021), 600 mg (5/11/2022), 600 mg (3/9/2022), 600 mg (2/23/2022), 600 mg (2/2/2022), 600 mg (1/19/2022), 600 mg (4/20/2022), 680 mg (4/6/2022), 600 mg (3/23/2022), 680 mg (10/5/2022), 680 mg (10/19/2022), 680 mg (11/2/2022)  irinotecan (CAMPTOSAR) 440 mg in sodium chloride 0.9% 500 mL chemo infusion, 446 mg, Intravenous, Clinic[facility] 1 time, 28 of 28 cycles  Dose modification: 180 mg/m2 (original dose 180 mg/m2, Cycle 24)  Administration: 440 mg (6/16/2021), 440 mg (6/30/2021), 440 mg (7/14/2021), 440 mg (7/28/2021), 440 mg (8/11/2021), 444 mg (8/25/2021), 440 mg (9/8/2021), 440 mg (9/22/2021), 440 mg (10/6/2021), 440 mg (10/20/2021), 440 mg (11/3/2021), 440 mg (12/1/2021), 440 mg (12/15/2021), 440 mg (11/17/2021), 440 mg (12/28/2021), 440 mg (5/11/2022), 440 mg (3/9/2022), 440 mg (2/23/2022), 440 mg (2/2/2022), 440 mg (1/19/2022), 440 mg (4/20/2022), 440 mg (4/6/2022), 440 mg (3/24/2022), 440 mg (6/1/2022), 440 mg (6/15/2022), 440 mg (10/19/2022), 440 mg (10/5/2022), 440 mg (11/2/2022)       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   4/3/2020 Initial Diagnosis    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     5/6/2020 - 11/17/2020 Chemotherapy    Treatment Summary   Plan Name: OP FOLFOX 6 Q2W  Treatment Goal: Curative  Status: Inactive  Start Date: 5/6/2020  End Date: 11/6/2020  Provider: Dylan Leyva MD  Chemotherapy: fluorouraciL injection 945 mg, 400 mg/m2 = 945 mg, Intravenous, [facility] 1 time, 14 of 14 cycles  Administration: 945 mg (5/6/2020), 945 mg (5/20/2020), 945 mg (6/3/2020), 945 mg (6/17/2020), 945 mg (7/29/2020), 945 mg (8/12/2020), 945 mg (8/26/2020), 945 mg (9/9/2020), 945 mg (9/23/2020), 945 mg (10/6/2020), 945 mg (10/21/2020), 945 mg (11/4/2020)  fluorouraciL 2,400 mg/m2 = 5,665 mg in sodium chloride 0.9% 240 mL chemo infusion, 2,400 mg/m2 = 5,665 mg, Intravenous, Over 46 hours, 14 of 14 cycles  Administration: 5,665 mg (5/6/2020), 5,665 mg (5/20/2020), 5,665 mg (6/3/2020), 5,665 mg (6/17/2020),  5,665 mg (7/29/2020), 5,665 mg (8/12/2020), 5,665 mg (8/26/2020), 5,665 mg (9/9/2020), 5,665 mg (9/23/2020), 5,665 mg (10/6/2020), 5,665 mg (10/21/2020), 5,665 mg (11/4/2020)  leucovorin calcium 900 mg in dextrose 5 % 250 mL infusion, 945 mg, Intravenous, Clinic/[facility] 1 time, 14 of 14 cycles  Administration: 900 mg (5/6/2020), 900 mg (5/20/2020), 900 mg (6/3/2020), 900 mg (6/17/2020), 945 mg (6/30/2020), 945 mg (7/20/2020), 945 mg (7/29/2020), 945 mg (8/12/2020), 945 mg (8/26/2020), 945 mg (9/9/2020), 945 mg (9/23/2020), 945 mg (10/6/2020), 945 mg (10/21/2020), 945 mg (11/4/2020)  oxaliplatin (ELOXATIN) 200 mg in dextrose 5 % 500 mL chemo infusion, 201 mg, Intravenous, Clinic/[facility] 1 time, 6 of 6 cycles  Dose modification: 65 mg/m2 (original dose 85 mg/m2, Cycle 6)  Administration: 200 mg (5/6/2020), 200 mg (5/20/2020), 200 mg (6/3/2020), 200 mg (6/17/2020), 201 mg (6/30/2020), 150 mg (7/20/2020)       6/16/2021 -  Chemotherapy    Treatment Summary   Plan Name: OP COLORECTAL FOLFIRI + BEVACIZUMAB Q2W  Treatment Goal: Control  Status: Active  Start Date: 6/16/2021  End Date: 11/4/2022  Provider: Marah Santo MD  Chemotherapy: fluorouraciL injection 990 mg, 400 mg/m2 = 990 mg, Intravenous, Clinic/[facility] 1 time, 15 of 15 cycles  Administration: 990 mg (6/16/2021), 990 mg (6/30/2021), 990 mg (7/14/2021), 980 mg (7/28/2021), 990 mg (8/11/2021), 990 mg (8/25/2021), 990 mg (9/8/2021), 990 mg (9/22/2021), 990 mg (10/6/2021), 990 mg (10/20/2021), 990 mg (11/3/2021), 990 mg (12/1/2021), 990 mg (12/15/2021), 990 mg (11/17/2021), 990 mg (12/28/2021)  fluorouraciL 2,400 mg/m2 = 5,950 mg in sodium chloride 0.9% 240 mL chemo infusion, 2,400 mg/m2 = 5,950 mg, Intravenous, Over 46 hours, 28 of 28 cycles  Administration: 5,950 mg (6/16/2021), 5,950 mg (6/30/2021), 5,950 mg (7/14/2021), 5,880 mg (7/28/2021), 5,930 mg (8/11/2021), 5,930 mg (8/25/2021), 5,930 mg (9/8/2021), 5,930 mg (9/22/2021), 5,930 mg (10/6/2021), 5,930 mg (10/20/2021),  5,930 mg (11/3/2021), 5,930 mg (12/1/2021), 5,930 mg (12/15/2021), 5,930 mg (11/17/2021), 5,930 mg (12/28/2021), 6,050 mg (5/11/2022), 6,025 mg (3/9/2022), 6,025 mg (2/23/2022), 5,930 mg (2/2/2022), 5,930 mg (1/19/2022), 6,050 mg (4/20/2022), 6,025 mg (4/6/2022), 6,025 mg (3/23/2022), 6,050 mg (6/1/2022), 6,050 mg (6/15/2022), 6,050 mg (10/19/2022), 6,050 mg (10/5/2022), 6,050 mg (11/2/2022)  bevacizumab (AVASTIN) 600 mg in sodium chloride 0.9% 100 mL chemo infusion, 660 mg, Intravenous, [facility] 1 time, 26 of 26 cycles  Dose modification: 5 mg/kg (original dose 5 mg/kg, Cycle 26, Reason: MD Discretion, Comment: 5 mg/kg original dose)  Administration: 600 mg (6/30/2021), 600 mg (7/14/2021), 600 mg (7/28/2021), 600 mg (6/16/2021), 600 mg (8/11/2021), 655 mg (8/25/2021), 600 mg (9/8/2021), 600 mg (9/22/2021), 600 mg (10/6/2021), 600 mg (10/20/2021), 600 mg (11/3/2021), 655 mg (12/1/2021), 600 mg (12/15/2021), 600 mg (11/17/2021), 600 mg (12/28/2021), 600 mg (5/11/2022), 600 mg (3/9/2022), 600 mg (2/23/2022), 600 mg (2/2/2022), 600 mg (1/19/2022), 600 mg (4/20/2022), 680 mg (4/6/2022), 600 mg (3/23/2022), 680 mg (10/5/2022), 680 mg (10/19/2022), 680 mg (11/2/2022)  irinotecan (CAMPTOSAR) 440 mg in sodium chloride 0.9% 500 mL chemo infusion, 446 mg, Intravenous, [facility] 1 time, 28 of 28 cycles  Dose modification: 180 mg/m2 (original dose 180 mg/m2, Cycle 24)  Administration: 440 mg (6/16/2021), 440 mg (6/30/2021), 440 mg (7/14/2021), 440 mg (7/28/2021), 440 mg (8/11/2021), 444 mg (8/25/2021), 440 mg (9/8/2021), 440 mg (9/22/2021), 440 mg (10/6/2021), 440 mg (10/20/2021), 440 mg (11/3/2021), 440 mg (12/1/2021), 440 mg (12/15/2021), 440 mg (11/17/2021), 440 mg (12/28/2021), 440 mg (5/11/2022), 440 mg (3/9/2022), 440 mg (2/23/2022), 440 mg (2/2/2022), 440 mg (1/19/2022), 440 mg (4/20/2022), 440 mg (4/6/2022), 440 mg (3/24/2022), 440 mg (6/1/2022), 440 mg (6/15/2022), 440 mg (10/19/2022), 440 mg (10/5/2022), 440 mg  (2022)           History:  Past Medical History:   Diagnosis Date    Anxiety     Depression     FH: ovarian cancer 3/16/2020    Hx of psychiatric care     Effexor, Paxil, Lexapro, Zoloft, Wellbutrin, Trazodone Buspar    Hyperthyroidism     Hypothyroid     Kidney calculi     Malignant neoplasm of sigmoid colon 3/16/2020    Menorrhagia     Multinodular goiter 2012    Palpitation     Psychiatric problem     Venous insufficiency       Past Surgical History:   Procedure Laterality Date     SECTION, CLASSIC      x3    COLONOSCOPY N/A 2020    Procedure: COLONOSCOPY;  Surgeon: Sahne Parker MD;  Location: [facility] ENDO;  Service: Endoscopy;  Laterality: N/A;    COLONOSCOPY N/A 2022    Procedure: COLONOSCOPY;  Surgeon: Shane Parker MD;  Location: [facility] ENDO;  Service: Endoscopy;  Laterality: N/A;    CYSTOSCOPY W/ URETERAL STENT PLACEMENT Bilateral 3/25/2020    Procedure: CYSTOSCOPY, WITH URETERAL STENT INSERTION;  Surgeon: Claudio Tyson MD;  Location: [facility] OR [address] [place] [address];  Service: Urology;  Laterality: Bilateral;    INSERTION OF TUNNELED CENTRAL VENOUS CATHETER (CVC) WITH SUBCUTANEOUS PORT N/A 2020    Procedure: UJAETBOFS-[id number]-CATH;  [facility];  Location: [facility] OR [address] [place] [address];  Service: Radiology;  Laterality: N/A;  Room 189/Cindy    LAPAROSCOPIC SIGMOIDECTOMY N/A 3/25/2020    Procedure: COLECTOMY, SIGMOID, LAPAROSCOPIC, flex sig, ERAS high;  Surgeon: Silvio Man MD;  [facility];  Service: Colon and Rectal;  Laterality: N/A;    MOBILIZATION OF SPLENIC FLEXURE  3/25/2020    Procedure: MOBILIZATION, SPLENIC FLEXURE;  Surgeon: Silvio Man MD;  [facility];  Service: Colon and Rectal;;    tonsillectomy      TOTAL ABDOMINAL HYSTERECTOMY W/ BILATERAL SALPINGOOPHORECTOMY N/A 3/25/2020    Procedure: HYSTERECTOMY, TOTAL, ABDOMINAL, WITH BILATERAL SALPINGO-OOPHORECTOMY;  Surgeon: Keron Brday MD;  Location: [facility] OR [facility];  Service: Oncology;  Laterality: N/A;     Family History   Problem Relation Age of Onset    Heart disease Father     Diabetes Mother 65    Drug abuse Brother     Drug abuse Brother     Cancer Maternal Aunt         lung cancer    Cancer Maternal Grandmother         stomach cancer- started in ovaries    Ovarian cancer Maternal Grandmother         stomach cancer- started in ovaries    Colon cancer Paternal Uncle     Cancer Paternal Uncle     Ovarian cancer Maternal Aunt     Ovarian cancer Maternal Aunt     Ovarian cancer Cousin         mother had ovarian cancer    Drug abuse Son     Drug abuse Son         clean/sober since 2012    Colon cancer Son     No Known Problems Daughter     Uterine cancer Neg Hx     Breast cancer Neg Hx      Social History     Tobacco Use    Smoking status: Never    Smokeless tobacco: Never   Substance and Sexual Activity    Alcohol use: Yes     Comment: occasionally- twice monthly    Drug use: Never    Sexual activity: Yes     Partners: Male     Birth control/protection: See Surgical Hx        ROS:   Review of Systems   Constitutional:  Positive for malaise/fatigue (first few days). Negative for fever and weight loss.   HENT:  Negative for congestion, hearing loss, nosebleeds and sore throat.         Mucositis    Eyes:  Negative for double vision and photophobia.   Respiratory:  Negative for cough, hemoptysis, sputum production, shortness of breath and wheezing.    Cardiovascular:  Negative for chest pain, palpitations, orthopnea and leg swelling.   Gastrointestinal:  Positive for constipation (intermittent) and nausea (improving). Negative for abdominal pain, blood in stool, diarrhea, heartburn and vomiting.   Genitourinary:  Negative for dysuria, frequency, hematuria and urgency.   Musculoskeletal:  Positive for back pain (seeing chiropractor). Negative for joint pain and myalgias.   Skin:  Negative for itching and rash.   Neurological:  Positive for headaches (post chemo). Negative for  "dizziness, tingling, seizures and weakness.   Endo/Heme/Allergies:  Negative for polydipsia. Does not bruise/bleed easily.   Psychiatric/Behavioral:  Negative for depression and memory loss. The patient is nervous/anxious. The patient does not have insomnia.       Objective:     Vitals:    11/14/22 1057   BP: (!) 116/18   Pulse: (!) 113   Resp: 18   SpO2: 97%   Weight: 133.7 kg (294 lb 12.1 oz)   Height: 5' 6" (1.676 m)   PainSc: 0-No pain           Wt Readings from Last 10 Encounters:   11/14/22 133.7 kg (294 lb 12.1 oz)   11/04/22 133 kg (293 lb 3.4 oz)   11/02/22 133 kg (293 lb 3.4 oz)   10/31/22 133 kg (293 lb 3.4 oz)   10/19/22 133.3 kg (293 lb 14 oz)   10/17/22 133.3 kg (293 lb 14 oz)   10/05/22 132.7 kg (292 lb 8.8 oz)   10/03/22 132.7 kg (292 lb 8.8 oz)   08/15/22 131.3 kg (289 lb 7.4 oz)   06/21/22 131.1 kg (289 lb)       Physical Examination:   Physical Exam  Vitals and nursing note reviewed.   Constitutional:       General: She is not in acute distress.     Appearance: She is not diaphoretic.   HENT:      Head: Normocephalic.      Mouth/Throat:      Pharynx: No oropharyngeal exudate.   Eyes:      General: No scleral icterus.     Conjunctiva/sclera: Conjunctivae normal.   Neck:      Thyroid: No thyromegaly.   Cardiovascular:      Rate and Rhythm: Normal rate and regular rhythm.      Heart sounds: Normal heart sounds. No murmur heard.  Pulmonary:      Effort: Pulmonary effort is normal. No respiratory distress.      Breath sounds: No stridor. No wheezing or rales.   Chest:      Chest wall: No tenderness.   Abdominal:      General: Bowel sounds are normal. There is no distension.      Palpations: Abdomen is soft. There is no mass.      Tenderness: There is no abdominal tenderness. There is no rebound.   Musculoskeletal:         General: No tenderness or deformity. Normal range of motion.      Cervical back: Neck supple.   Lymphadenopathy:      Cervical: No cervical adenopathy.   Skin:     General: Skin is " warm and dry.      Findings: No erythema or rash.   Neurological:      Mental Status: She is alert and oriented to person, place, and time.      Cranial Nerves: No cranial nerve deficit.      Coordination: Coordination normal.      Gait: Gait is intact.   Psychiatric:         Mood and Affect: Affect normal.         Cognition and Memory: Memory normal.         Judgment: Judgment normal.        Diagnostic Tests:  Significant Imaging: I have reviewed and interpreted all pertinent imaging results/findings.  Laboratory Data:  All pertinent labs have been reviewed.  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 Oxaliplatin was stopped due to severe adverse reaction.     Follow-up CTs and PET in May 2021 showed enlarging retroperitoneal node, concerning for metastatic disease.      She started FOLFIRI + Avastin and has continued till July 2022.   Given isolated RP toni disease, she has undergone open Left periaortic and aortocaval lymph node dissection on 7/12/2022.     Follow up scans show disease progression. I have discussed the case with Dr. Montelongo at [facility]. We discussed resuming FOLFIRI-lloyd vs transitioning to BRAF directed therapy (BEACON).   There may be an option ton enroll in SWOG 2107 (encorafenib/cetuximab +/- nivo) at [facility] if no prior BRAF inhibitor exposure.      Patient is interested in the trial and therefore will resume FOLFIRI- Debo at this time and repeat scans in December.  By then, hope to enroll in the trial at [facility].     Cleared to receive treatment. RTC 2 weeks.     Nausea   Improving on compazine.     Mucositis   Continue [facility] soln.     Transaminitis  Fluctuates.  Continue to monitor. No obvious liver mets.     Hypercalcemia   Mild, secondary to hyperparathyroidism.  Avoid calcium supplements.     Hypothyroidism  Multinodular goiter   Continue Levothyroxine. Endocrine consult is appreciated.   Had a non diagnostic biopsy in 2012, usg findings have remained stable. However, given uptake on PET this year, an FNA vs repeat usg in 6 months is recommended. Will follow closely.     Essential HTN   Continue antihypertensives.      Anxiety   Continue to  follow up with Dr. Palm, saw him today.     MDM includes  :    - Acute or chronic illness or injury that poses a threat to life or bodily function  - Independent review and explanation of 3+ results from unique tests  - Discussion of management and ordering 3+ unique tests  - Extensive discussion of treatment and management  - Prescription drug management  - Drug therapy requiring intensive monitoring for toxicity          ECOG SCORE               Discussion:   No follow-ups on file.    Plan was discussed with the patient at length, and she verbalized understanding. aGil was given an opportunity to ask questions that were answered to her satisfaction, and she was advised to call in the interval if any problems or questions arise.    Electronically signed by Marah Santo MD        Route Chart for Scheduling    Med Onc Chart Routing      Follow up with physician . 12/12   Follow up with TAVO . See Vinod 11/28   Infusion scheduling note 11/30,12/14   Injection scheduling note    Labs CBC and CMP   Lab interval:  11/28,12/12   Imaging    Pharmacy appointment    Other referrals        Treatment Plan Information   OP  COLORECTAL FOLFIRI + BEVACIZUMAB Q2W   Marah Santo MD   Upcoming Treatment Dates - OP COLORECTAL FOLFIRI + BEVACIZUMAB Q2W    No upcoming days in selected categories.    Supportive Plan Information  IV FLUIDS AND ELECTROLYTES   Brayden Solo MD   Upcoming Treatment Dates - IV FLUIDS AND ELECTROLYTES    No upcoming days in selected categories.    Therapy Plan Information  PORT FLUSH  Flushes  heparin, porcine (PF) 100 unit/mL injection flush 500 Units  500 Units, Intravenous, Every visit  sodium chloride 0.9% flush 10 mL  10 mL, Intravenous, Every visit      Answers submitted by the patient for this visit:  Review of Systems Questionnaire (Submitted on 11/13/2022)  appetite change : No  unexpected weight change: No  mouth sores: Yes  visual disturbance: No  adenopathy: No

## 2022-11-14 NOTE — PROGRESS NOTES
PSYCHO-ONCOLOGY NOTE/ Individual Psychotherapy     Date: 11/14/2022   Site:  [facility]      Therapeutic Intervention: Met with patient.  Outpatient - Behavior modifying psychotherapy 45 min - CPT code 59748 and Outpatient - Supportive psychotherapy 45 min - CPT Code 30222    This includes face to face time and non-face to face time preparing to see the patient, obtaining and/or reviewing separately obtained history, documenting clinical information in the electronic or other health record, independently interpreting results and communicating results to the patient/family/caregiver, or care coordinator.      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    Anxiety    Insomnia    Insomnia    Anxiety    Other constipation    Chemotherapy-induced nausea    Generalized anxiety disorder with panic attacks    Chemotherapy adverse reaction    Mucositis due to chemotherapy    Morbid obesity    Secondary adenocarcinoma of lymph node    Thyroid nodule    Hypercalcemia    Transaminitis    Dysuria    Chemotherapy-induced neutropenia    Hypophosphatemia    Functional diarrhea    Primary hypertension       Chief complaint/reason for encounter: adjustment to illness, depression and anxiety      Met with patient to evaluate psychosocial adaptation to diagnosis/treatment of metastatic colon cancer    Current Medications  Current Outpatient Medications   Medication    acetaminophen (TYLENOL) 500 MG tablet    buPROPion (WELLBUTRIN SR) 150 MG TBSR 12 hr tablet    busPIRone (BUSPAR) 10 MG tablet    granisetron (SANCUSO) 3.1 mg/24 hour     Lactobacillus rhamnosus GG (CULTURELLE) 10 billion cell capsule    lactulose (CHRONULAC) 10 gram/15 mL solution    levothyroxine (SYNTHROID) 200 MCG tablet    LIDOcaine-prilocaine (EMLA) cream    LORazepam (ATIVAN) 1 MG tablet    magic mouthwash diphen/antac/lidoc/nysta    multivitamin (THERAGRAN) per tablet    olmesartan (BENICAR) 20 MG tablet    ondansetron (ZOFRAN-ODT) 8 MG TbDL    prochlorperazine (COMPAZINE) 10 MG tablet    promethazine (PHENERGAN) 12.5 MG Tab    senna-docusate 8.6-50 mg (PERICOLACE) 8.6-50 mg per tablet    tamsulosin (FLOMAX) 0.4 mg Cap    traMADoL (ULTRAM) 50 mg tablet    traZODone (DESYREL) 50 MG tablet     No current facility-administered medications for this visit.       ONCOLOGY HISTORY  Oncology History   Malignant neoplasm of sigmoid colon   3/16/2020 Initial Diagnosis    Malignant neoplasm of sigmoid colon     3/31/2020 Cancer Staged    Staging form: Colon and Rectum, AJCC 8th Edition  - Clinical stage from 3/31/2020: Stage IIIC (cT4b, cN2a, cM0)       5/6/2020 - 11/17/2020 Chemotherapy    Treatment Summary   Plan Name: OP FOLFOX 6 Q2W  Treatment Goal: Curative  Status: Inactive  Start Date: 5/6/2020  End Date: 11/6/2020  Provider: Dylan Leyva MD  Chemotherapy: fluorouraciL injection 945 mg, 400 mg/m2 = 945 mg, Intravenous, Clinic/[facility] 1 time, 14 of 14 cycles  Administration: 945 mg (5/6/2020), 945 mg (5/20/2020), 945 mg (6/3/2020), 945 mg (6/17/2020), 945 mg (7/29/2020), 945 mg (8/12/2020), 945 mg (8/26/2020), 945 mg (9/9/2020), 945 mg (9/23/2020), 945 mg (10/6/2020), 945 mg (10/21/2020), 945 mg (11/4/2020)  fluorouraciL 2,400 mg/m2 = 5,665 mg in sodium chloride 0.9% 240 mL chemo infusion, 2,400 mg/m2 = 5,665 mg, Intravenous, Over 46 hours, 14 of 14 cycles  Administration: 5,665 mg (5/6/2020), 5,665 mg (5/20/2020), 5,665 mg (6/3/2020), 5,665 mg (6/17/2020), 5,665 mg (7/29/2020), 5,665 mg (8/12/2020), 5,665 mg (8/26/2020), 5,665 mg (9/9/2020), 5,665 mg (9/23/2020), 5,665 mg  (10/6/2020), 5,665 mg (10/21/2020), 5,665 mg (11/4/2020)  leucovorin calcium 900 mg in dextrose 5 % 250 mL infusion, 945 mg, Intravenous, Clinic[facility] 1 time, 14 of 14 cycles  Administration: 900 mg (5/6/2020), 900 mg (5/20/2020), 900 mg (6/3/2020), 900 mg (6/17/2020), 945 mg (6/30/2020), 945 mg (7/20/2020), 945 mg (7/29/2020), 945 mg (8/12/2020), 945 mg (8/26/2020), 945 mg (9/9/2020), 945 mg (9/23/2020), 945 mg (10/6/2020), 945 mg (10/21/2020), 945 mg (11/4/2020)  oxaliplatin (ELOXATIN) 200 mg in dextrose 5 % 500 mL chemo infusion, 201 mg, Intravenous, [facility] 1 time, 6 of 6 cycles  Dose modification: 65 mg/m2 (original dose 85 mg/m2, Cycle 6)  Administration: 200 mg (5/6/2020), 200 mg (5/20/2020), 200 mg (6/3/2020), 200 mg (6/17/2020), 201 mg (6/30/2020), 150 mg (7/20/2020)       6/16/2021 -  Chemotherapy    Treatment Summary   Plan Name: OP COLORECTAL FOLFIRI + BEVACIZUMAB Q2W  Treatment Goal: Control  Status: Active  Start Date: 6/16/2021  End Date: 11/4/2022  Provider: Marah Santo MD  Chemotherapy: fluorouraciL injection 990 mg, 400 mg/m2 = 990 mg, Intravenous, [facility] 1 time, 15 of 15 cycles  Administration: 990 mg (6/16/2021), 990 mg (6/30/2021), 990 mg (7/14/2021), 980 mg (7/28/2021), 990 mg (8/11/2021), 990 mg (8/25/2021), 990 mg (9/8/2021), 990 mg (9/22/2021), 990 mg (10/6/2021), 990 mg (10/20/2021), 990 mg (11/3/2021), 990 mg (12/1/2021), 990 mg (12/15/2021), 990 mg (11/17/2021), 990 mg (12/28/2021)  fluorouraciL 2,400 mg/m2 = 5,950 mg in sodium chloride 0.9% 240 mL chemo infusion, 2,400 mg/m2 = 5,950 mg, Intravenous, Over 46 hours, 28 of 28 cycles  Administration: 5,950 mg (6/16/2021), 5,950 mg (6/30/2021), 5,950 mg (7/14/2021), 5,880 mg (7/28/2021), 5,930 mg (8/11/2021), 5,930 mg (8/25/2021), 5,930 mg (9/8/2021), 5,930 mg (9/22/2021), 5,930 mg (10/6/2021), 5,930 mg (10/20/2021), 5,930 mg (11/3/2021), 5,930 mg (12/1/2021), 5,930 mg (12/15/2021), 5,930 mg (11/17/2021), 5,930 mg (12/28/2021),  6,050 mg (5/11/2022), 6,025 mg (3/9/2022), 6,025 mg (2/23/2022), 5,930 mg (2/2/2022), 5,930 mg (1/19/2022), 6,050 mg (4/20/2022), 6,025 mg (4/6/2022), 6,025 mg (3/23/2022), 6,050 mg (6/1/2022), 6,050 mg (6/15/2022), 6,050 mg (10/19/2022), 6,050 mg (10/5/2022), 6,050 mg (11/2/2022)  bevacizumab (AVASTIN) 600 mg in sodium chloride 0.9% 100 mL chemo infusion, 660 mg, Intravenous, [facility] 1 time, 26 of 26 cycles  Dose modification: 5 mg/kg (original dose 5 mg/kg, Cycle 26, Reason: MD Discretion, Comment: 5 mg/kg original dose)  Administration: 600 mg (6/30/2021), 600 mg (7/14/2021), 600 mg (7/28/2021), 600 mg (6/16/2021), 600 mg (8/11/2021), 655 mg (8/25/2021), 600 mg (9/8/2021), 600 mg (9/22/2021), 600 mg (10/6/2021), 600 mg (10/20/2021), 600 mg (11/3/2021), 655 mg (12/1/2021), 600 mg (12/15/2021), 600 mg (11/17/2021), 600 mg (12/28/2021), 600 mg (5/11/2022), 600 mg (3/9/2022), 600 mg (2/23/2022), 600 mg (2/2/2022), 600 mg (1/19/2022), 600 mg (4/20/2022), 680 mg (4/6/2022), 600 mg (3/23/2022), 680 mg (10/5/2022), 680 mg (10/19/2022), 680 mg (11/2/2022)  irinotecan (CAMPTOSAR) 440 mg in sodium chloride 0.9% 500 mL chemo infusion, 446 mg, Intravenous, [facility] 1 time, 28 of 28 cycles  Dose modification: 180 mg/m2 (original dose 180 mg/m2, Cycle 24)  Administration: 440 mg (6/16/2021), 440 mg (6/30/2021), 440 mg (7/14/2021), 440 mg (7/28/2021), 440 mg (8/11/2021), 444 mg (8/25/2021), 440 mg (9/8/2021), 440 mg (9/22/2021), 440 mg (10/6/2021), 440 mg (10/20/2021), 440 mg (11/3/2021), 440 mg (12/1/2021), 440 mg (12/15/2021), 440 mg (11/17/2021), 440 mg (12/28/2021), 440 mg (5/11/2022), 440 mg (3/9/2022), 440 mg (2/23/2022), 440 mg (2/2/2022), 440 mg (1/19/2022), 440 mg (4/20/2022), 440 mg (4/6/2022), 440 mg (3/24/2022), 440 mg (6/1/2022), 440 mg (6/15/2022), 440 mg (10/19/2022), 440 mg (10/5/2022), 440 mg (11/2/2022)       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   4/3/2020 Initial Diagnosis    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 lymph  node     5/6/2020 - 11/17/2020 Chemotherapy    Treatment Summary   Plan Name: OP FOLFOX 6 Q2W  Treatment Goal: Curative  Status: Inactive  Start Date: 5/6/2020  End Date: 11/6/2020  Provider: Dylan Leyva MD  Chemotherapy: fluorouraciL injection 945 mg, 400 mg/m2 = 945 mg, Intravenous, Clinic/[facility] 1 time, 14 of 14 cycles  Administration: 945 mg (5/6/2020), 945 mg (5/20/2020), 945 mg (6/3/2020), 945 mg (6/17/2020), 945 mg (7/29/2020), 945 mg (8/12/2020), 945 mg (8/26/2020), 945 mg (9/9/2020), 945 mg (9/23/2020), 945 mg (10/6/2020), 945 mg (10/21/2020), 945 mg (11/4/2020)  fluorouraciL 2,400 mg/m2 = 5,665 mg in sodium chloride 0.9% 240 mL chemo infusion, 2,400 mg/m2 = 5,665 mg, Intravenous, Over 46 hours, 14 of 14 cycles  Administration: 5,665 mg (5/6/2020), 5,665 mg (5/20/2020), 5,665 mg (6/3/2020), 5,665 mg (6/17/2020), 5,665 mg (7/29/2020), 5,665 mg (8/12/2020), 5,665 mg (8/26/2020), 5,665 mg (9/9/2020), 5,665 mg (9/23/2020), 5,665 mg (10/6/2020), 5,665 mg (10/21/2020), 5,665 mg (11/4/2020)  leucovorin calcium 900 mg in dextrose 5 % 250 mL infusion, 945 mg, Intravenous, Clinic/[facility] 1 time, 14 of 14 cycles  Administration: 900 mg (5/6/2020), 900 mg (5/20/2020), 900 mg (6/3/2020), 900 mg (6/17/2020), 945 mg (6/30/2020), 945 mg (7/20/2020), 945 mg (7/29/2020), 945 mg (8/12/2020), 945 mg (8/26/2020), 945 mg (9/9/2020), 945 mg (9/23/2020), 945 mg (10/6/2020), 945 mg (10/21/2020), 945 mg (11/4/2020)  oxaliplatin (ELOXATIN) 200 mg in dextrose 5 % 500 mL chemo infusion, 201 mg, Intravenous, Clinic/[facility] 1 time, 6 of 6 cycles  Dose modification: 65 mg/m2 (original dose 85 mg/m2, Cycle 6)  Administration: 200 mg (5/6/2020), 200 mg (5/20/2020), 200 mg (6/3/2020), 200 mg (6/17/2020), 201 mg (6/30/2020), 150 mg (7/20/2020)       6/16/2021 -  Chemotherapy    Treatment Summary   Plan Name: OP COLORECTAL FOLFIRI + BEVACIZUMAB Q2W  Treatment Goal: Control  Status: Active  Start Date: 6/16/2021  End Date: 11/4/2022  Provider: Marah  NGUYEN Santo MD  Chemotherapy: fluorouraciL injection 990 mg, 400 mg/m2 = 990 mg, Intravenous, [facility] 1 time, 15 of 15 cycles  Administration: 990 mg (6/16/2021), 990 mg (6/30/2021), 990 mg (7/14/2021), 980 mg (7/28/2021), 990 mg (8/11/2021), 990 mg (8/25/2021), 990 mg (9/8/2021), 990 mg (9/22/2021), 990 mg (10/6/2021), 990 mg (10/20/2021), 990 mg (11/3/2021), 990 mg (12/1/2021), 990 mg (12/15/2021), 990 mg (11/17/2021), 990 mg (12/28/2021)  fluorouraciL 2,400 mg/m2 = 5,950 mg in sodium chloride 0.9% 240 mL chemo infusion, 2,400 mg/m2 = 5,950 mg, Intravenous, Over 46 hours, 28 of 28 cycles  Administration: 5,950 mg (6/16/2021), 5,950 mg (6/30/2021), 5,950 mg (7/14/2021), 5,880 mg (7/28/2021), 5,930 mg (8/11/2021), 5,930 mg (8/25/2021), 5,930 mg (9/8/2021), 5,930 mg (9/22/2021), 5,930 mg (10/6/2021), 5,930 mg (10/20/2021), 5,930 mg (11/3/2021), 5,930 mg (12/1/2021), 5,930 mg (12/15/2021), 5,930 mg (11/17/2021), 5,930 mg (12/28/2021), 6,050 mg (5/11/2022), 6,025 mg (3/9/2022), 6,025 mg (2/23/2022), 5,930 mg (2/2/2022), 5,930 mg (1/19/2022), 6,050 mg (4/20/2022), 6,025 mg (4/6/2022), 6,025 mg (3/23/2022), 6,050 mg (6/1/2022), 6,050 mg (6/15/2022), 6,050 mg (10/19/2022), 6,050 mg (10/5/2022), 6,050 mg (11/2/2022)  bevacizumab (AVASTIN) 600 mg in sodium chloride 0.9% 100 mL chemo infusion, 660 mg, Intravenous, Clinic/[facility] 1 time, 26 of 26 cycles  Dose modification: 5 mg/kg (original dose 5 mg/kg, Cycle 26, Reason: MD Discretion, Comment: 5 mg/kg original dose)  Administration: 600 mg (6/30/2021), 600 mg (7/14/2021), 600 mg (7/28/2021), 600 mg (6/16/2021), 600 mg (8/11/2021), 655 mg (8/25/2021), 600 mg (9/8/2021), 600 mg (9/22/2021), 600 mg (10/6/2021), 600 mg (10/20/2021), 600 mg (11/3/2021), 655 mg (12/1/2021), 600 mg (12/15/2021), 600 mg (11/17/2021), 600 mg (12/28/2021), 600 mg (5/11/2022), 600 mg (3/9/2022), 600 mg (2/23/2022), 600 mg (2/2/2022), 600 mg (1/19/2022), 600 mg (4/20/2022), 680 mg (4/6/2022), 600 mg  (3/23/2022), 680 mg (10/5/2022), 680 mg (10/19/2022), 680 mg (11/2/2022)  irinotecan (CAMPTOSAR) 440 mg in sodium chloride 0.9% 500 mL chemo infusion, 446 mg, Intravenous, Clinic/[facility] 1 time, 28 of 28 cycles  Dose modification: 180 mg/m2 (original dose 180 mg/m2, Cycle 24)  Administration: 440 mg (6/16/2021), 440 mg (6/30/2021), 440 mg (7/14/2021), 440 mg (7/28/2021), 440 mg (8/11/2021), 444 mg (8/25/2021), 440 mg (9/8/2021), 440 mg (9/22/2021), 440 mg (10/6/2021), 440 mg (10/20/2021), 440 mg (11/3/2021), 440 mg (12/1/2021), 440 mg (12/15/2021), 440 mg (11/17/2021), 440 mg (12/28/2021), 440 mg (5/11/2022), 440 mg (3/9/2022), 440 mg (2/23/2022), 440 mg (2/2/2022), 440 mg (1/19/2022), 440 mg (4/20/2022), 440 mg (4/6/2022), 440 mg (3/24/2022), 440 mg (6/1/2022), 440 mg (6/15/2022), 440 mg (10/19/2022), 440 mg (10/5/2022), 440 mg (11/2/2022)           Objective:  Gail Mckinnon arrived promptly for the session. Ms. Mckinnon was independently ambulatory at the time of session. The patient was fully cooperative throughout the session.  Appearance: age appropriate, casually  dressed, well groomed  Behavior/Cooperation: friendly and cooperative  Speech: pressured  Mood: depressed, distressed  Affect: dysthymic, anxious  Thought Process: goal-directed, logical  Thought Content: normal,  No delusions or paranoia; did not appear to be responding to internal stimuli during the session  Orientation: grossly intact  Memory: grossly intact  Attention Span/Concentration: Attends to session without distraction; reports no difficulty  Fund of Knowledge: average  Estimate of Intelligence: average from verbal skills and history  Cognition: grossly intact  Insight: patient has awareness of illness; good insight into own behavior and behavior of others  Judgment: the patient's behavior is adequate to circumstances    NCCN Distress thermometer:   DISTRESS SCREENING 11/13/2022 10/31/2022 10/31/2022 10/14/2022 9/30/2022 7/26/2022  7/26/2022   Distress Score 6 7 7 7 8 4 4   Practical Problems Insurance/Financial;Work/School;Treatment Decisions Work/School;Insurance/Financial Insurance/Financial;Work/School Insurance/Financial;Work/School;Treatment Decisions Insurance/Financial;Treatment Decisions Insurance/Financial Insurance/Financial   Family Problems Family Health Issues None of these None of these None of these None of these None of these None of these   Emotional Problems Depression;Sadness;Worry Worry;Depression Depression;Worry Depression;Fears;Worry Depression;Sadness;Worry Depression;Worry Depression;Worry   Spiritual / Sabianism Concerns No No No No No No No   Physical Problems Appearance;Constipation;Fatigue;Getting Around;Memory/Concentration;Mouth Sores;Pain Appearance;Sleep;Mouth Sores;Fatigue Appearance;Fatigue;Feeling Swollen;Mouth Sores;Nausea Fatigue;Sleep Fatigue None of these None of these   Other Problems - - - - - - -   Some encounter information is confidential and restricted. Go to Review Flowsheets activity to see all data.        Interval history and content of current session: Explored adaptation to current psychosocial/financial situation and post-surgical adaptation to illness. Reports to be coping with great difficulty, describing reduced sense of self worth associated w/ cognitions regarding eating behaviors and locus of control. Thoughts of this type are in evidence with high distress. Provided cognitive behavioral therapy to address negative cognitions, re-examining all or nothing thinking patterns, encouraging moderation w/ cognitive awareness, and pacing behaviors w/ job search/home tasks. Identified and evaluated psychosocial and environmental stressors secondary to diagnosis and treatment.  Examined proactive behaviors that may be implemented to minimize or ameliorate psychosocial stressors secondary to diagnosis and treatment.     Risk parameters:   Patient reports no suicidal ideation  Patient reports no  homicidal ideation  Patient reports no self-injurious behavior  Patient reports no violent behavior   Safety needs:  None at this time      Verbal deficits: None     Patient's response to intervention:The patient's response to intervention is accepting.     Progress toward goals and other mental status changes:  The patient's progress toward goals is limited.      Progress to date:No Progress - Continue Objectives      Goals from last visit: Attempted, not met        Patient Strengths:  verbal, motivated, intelligent, successful, good social support, good insight, commitment to wellness, strong cultural traditions        Treatment Plan:individual psychotherapy  Target symptoms: depression, anxiety, adjustment  Why chosen therapy is appropriate versus another modality: relevant to diagnosis, patient responds to this modality, evidence based practice  Outcome monitoring methods: self-report, observation, tx team feedback  Therapeutic intervention type: insight oriented psychotherapy, supportive psychotherapy  Prognosis: Good                            Behavioral goals:               Exercise: continued/increased as per physician recommendations              Stress management: appropriate boundaries and accepting aid when needed              Social engagement: continued and increased especially w/ grandchildren/family, as per CDC COVID-19 guidelines              Therapy:  adaptive coping, CBT (cognitive restructuring), management of all or nothing thinking patterns    Return to clinic: 3 weeks     Length of Service (minutes direct face-to-face contact): 45    Diagnosis:     ICD-10-CM ICD-9-CM   1. Moderate episode of recurrent major depressive disorder  F33.1 296.32   2. Anxiety associated with cancer diagnosis  F41.1 300.09    C80.1                 Marky Palm Psy.D.  [place] License #[id number]  [place] License #[id number]

## 2022-11-15 RX ORDER — LORAZEPAM 2 MG/ML
1 INJECTION INTRAMUSCULAR
Status: CANCELLED | OUTPATIENT
Start: 2022-11-16 | End: 2022-11-16

## 2022-11-15 RX ORDER — ATROPINE SULFATE 0.4 MG/ML
0.4 INJECTION, SOLUTION ENDOTRACHEAL; INTRAMEDULLARY; INTRAMUSCULAR; INTRAVENOUS; SUBCUTANEOUS ONCE AS NEEDED
Status: CANCELLED | OUTPATIENT
Start: 2022-11-16 | End: 2034-04-13

## 2022-11-15 RX ORDER — SODIUM CHLORIDE 0.9 % (FLUSH) 0.9 %
10 SYRINGE (ML) INJECTION
Status: CANCELLED | OUTPATIENT
Start: 2022-11-16

## 2022-11-15 RX ORDER — HEPARIN 100 UNIT/ML
500 SYRINGE INTRAVENOUS
Status: CANCELLED | OUTPATIENT
Start: 2022-11-16

## 2022-11-16 ENCOUNTER — INFUSION (OUTPATIENT)
Dept: INFUSION THERAPY | Facility: HOSPITAL | Age: 52
End: 2022-11-16
Attending: INTERNAL MEDICINE
Payer: MEDICAID

## 2022-11-16 ENCOUNTER — PATIENT MESSAGE (OUTPATIENT)
Dept: PSYCHIATRY | Facility: CLINIC | Age: 52
End: 2022-11-16
Payer: MEDICAID

## 2022-11-16 ENCOUNTER — TELEPHONE (OUTPATIENT)
Dept: PSYCHIATRY | Facility: CLINIC | Age: 52
End: 2022-11-16
Payer: MEDICAID

## 2022-11-16 VITALS
HEART RATE: 92 BPM | SYSTOLIC BLOOD PRESSURE: 132 MMHG | BODY MASS INDEX: 47.09 KG/M2 | TEMPERATURE: 98 F | RESPIRATION RATE: 16 BRPM | WEIGHT: 293 LBS | HEIGHT: 66 IN | DIASTOLIC BLOOD PRESSURE: 84 MMHG

## 2022-11-16 DIAGNOSIS — C77.2 METASTASIS TO INTESTINAL LYMPH NODE: ICD-10-CM

## 2022-11-16 DIAGNOSIS — C18.7 MALIGNANT NEOPLASM OF SIGMOID COLON: Primary | ICD-10-CM

## 2022-11-16 LAB
BILIRUB UR QL STRIP: NEGATIVE
CLARITY UR: CLEAR
COLOR UR: YELLOW
GLUCOSE UR QL STRIP: NEGATIVE
HGB UR QL STRIP: NEGATIVE
KETONES UR QL STRIP: ABNORMAL
LEUKOCYTE ESTERASE UR QL STRIP: NEGATIVE
NITRITE UR QL STRIP: NEGATIVE
PH UR STRIP: 6 [PH] (ref 5–8)
PROT UR QL STRIP: NEGATIVE
SP GR UR STRIP: 1.02 (ref 1–1.03)
URN SPEC COLLECT METH UR: ABNORMAL

## 2022-11-16 PROCEDURE — 96375 TX/PRO/DX INJ NEW DRUG ADDON: CPT | Mod: PN

## 2022-11-16 PROCEDURE — 96415 CHEMO IV INFUSION ADDL HR: CPT | Mod: PN

## 2022-11-16 PROCEDURE — 96416 CHEMO PROLONG INFUSE W/PUMP: CPT | Mod: PN

## 2022-11-16 PROCEDURE — 96417 CHEMO IV INFUS EACH ADDL SEQ: CPT | Mod: PN

## 2022-11-16 PROCEDURE — 63600175 PHARM REV CODE 636 W HCPCS: Mod: PN | Performed by: INTERNAL MEDICINE

## 2022-11-16 PROCEDURE — 81003 URINALYSIS AUTO W/O SCOPE: CPT | Mod: PN | Performed by: INTERNAL MEDICINE

## 2022-11-16 PROCEDURE — 25000003 PHARM REV CODE 250: Mod: PN | Performed by: INTERNAL MEDICINE

## 2022-11-16 PROCEDURE — 96413 CHEMO IV INFUSION 1 HR: CPT | Mod: PN

## 2022-11-16 PROCEDURE — 96368 THER/DIAG CONCURRENT INF: CPT | Mod: PN

## 2022-11-16 PROCEDURE — 96367 TX/PROPH/DG ADDL SEQ IV INF: CPT | Mod: PN

## 2022-11-16 RX ORDER — ATROPINE SULFATE 0.4 MG/ML
0.4 INJECTION, SOLUTION ENDOTRACHEAL; INTRAMEDULLARY; INTRAMUSCULAR; INTRAVENOUS; SUBCUTANEOUS ONCE AS NEEDED
Status: DISCONTINUED | OUTPATIENT
Start: 2022-11-16 | End: 2022-11-16 | Stop reason: HOSPADM

## 2022-11-16 RX ORDER — SODIUM CHLORIDE 0.9 % (FLUSH) 0.9 %
10 SYRINGE (ML) INJECTION
Status: DISCONTINUED | OUTPATIENT
Start: 2022-11-16 | End: 2022-11-16 | Stop reason: HOSPADM

## 2022-11-16 RX ORDER — LORAZEPAM 2 MG/ML
1 INJECTION INTRAMUSCULAR
Status: COMPLETED | OUTPATIENT
Start: 2022-11-16 | End: 2022-11-16

## 2022-11-16 RX ORDER — HEPARIN 100 UNIT/ML
500 SYRINGE INTRAVENOUS
Status: DISCONTINUED | OUTPATIENT
Start: 2022-11-16 | End: 2022-11-16 | Stop reason: HOSPADM

## 2022-11-16 RX ADMIN — SODIUM CHLORIDE: 0.9 INJECTION, SOLUTION INTRAVENOUS at 11:11

## 2022-11-16 RX ADMIN — FLUOROURACIL 6050 MG: 50 INJECTION, SOLUTION INTRAVENOUS at 03:11

## 2022-11-16 RX ADMIN — PROMETHAZINE HYDROCHLORIDE 6.25 MG: 25 INJECTION INTRAMUSCULAR; INTRAVENOUS at 12:11

## 2022-11-16 RX ADMIN — SODIUM CHLORIDE 440 MG: 0.9 INJECTION, SOLUTION INTRAVENOUS at 02:11

## 2022-11-16 RX ADMIN — LORAZEPAM 1 MG: 2 INJECTION INTRAMUSCULAR; INTRAVENOUS at 12:11

## 2022-11-16 RX ADMIN — BEVACIZUMAB 680 MG: 400 INJECTION, SOLUTION INTRAVENOUS at 01:11

## 2022-11-16 RX ADMIN — PALONOSETRON 0.25 MG: 0.05 INJECTION, SOLUTION INTRAVENOUS at 12:11

## 2022-11-16 RX ADMIN — LEUCOVORIN CALCIUM 1010 MG: 350 INJECTION, POWDER, LYOPHILIZED, FOR SOLUTION INTRAMUSCULAR; INTRAVENOUS at 02:11

## 2022-11-17 ENCOUNTER — DOCUMENTATION ONLY (OUTPATIENT)
Dept: INFUSION THERAPY | Facility: HOSPITAL | Age: 52
End: 2022-11-17
Payer: MEDICAID

## 2022-11-17 NOTE — PROGRESS NOTES
Late entry for 11/16/22    Oncology   Chemotherapy Infusion Visit    Quick Social Service Status Follow Up   Met w/ pt briefly to follow up on social and emotional needs since initiation of treatment.    SW met with pt who is concerned regarding fiances with her change in employment status. Pt remains unemployed. She is in need of help with her rent. [employer] has assisted previously. SW to explore other resources to help with this need. Rent it due 12/01/22. Pt is getting in touch with a family member to see if he can be of assistance as well.     Pt did start her disability application with [employer]. And received a large packet of information. She is having difficulties with answering some questions. SW and pt will work on it together at her next appointment.       SW provided pt 2 web-site for resources and financial counseling and support: [employer]. Pt said she is overwhelmed by the web sites. SW encouraged her to call and speak to a counselor in person,. They can assist over the phone.      Pt has applied for Medicaid. She revived a denial because they were missing income documentation. She has spoken with them and has sent in needed information. Waiting to hear back from them.       Radha Giordano, MEDINA  11/17/2022  8:05 AM

## 2022-11-18 ENCOUNTER — DOCUMENTATION ONLY (OUTPATIENT)
Dept: INFUSION THERAPY | Facility: HOSPITAL | Age: 52
End: 2022-11-18

## 2022-11-18 ENCOUNTER — INFUSION (OUTPATIENT)
Dept: INFUSION THERAPY | Facility: HOSPITAL | Age: 52
End: 2022-11-18
Attending: INTERNAL MEDICINE
Payer: MEDICAID

## 2022-11-18 VITALS
HEART RATE: 83 BPM | RESPIRATION RATE: 16 BRPM | HEIGHT: 66 IN | DIASTOLIC BLOOD PRESSURE: 83 MMHG | BODY MASS INDEX: 47.09 KG/M2 | TEMPERATURE: 98 F | WEIGHT: 293 LBS | SYSTOLIC BLOOD PRESSURE: 136 MMHG

## 2022-11-18 DIAGNOSIS — R11.0 NAUSEA: ICD-10-CM

## 2022-11-18 DIAGNOSIS — C18.7 MALIGNANT NEOPLASM OF SIGMOID COLON: ICD-10-CM

## 2022-11-18 DIAGNOSIS — C77.2 METASTASIS TO INTESTINAL LYMPH NODE: Primary | ICD-10-CM

## 2022-11-18 PROCEDURE — 25000003 PHARM REV CODE 250: Mod: PN | Performed by: NURSE PRACTITIONER

## 2022-11-18 PROCEDURE — A4216 STERILE WATER/SALINE, 10 ML: HCPCS | Mod: PN | Performed by: INTERNAL MEDICINE

## 2022-11-18 PROCEDURE — 96361 HYDRATE IV INFUSION ADD-ON: CPT | Mod: PN

## 2022-11-18 PROCEDURE — 63600175 PHARM REV CODE 636 W HCPCS: Mod: PN | Performed by: INTERNAL MEDICINE

## 2022-11-18 PROCEDURE — 96360 HYDRATION IV INFUSION INIT: CPT | Mod: PN

## 2022-11-18 PROCEDURE — 25000003 PHARM REV CODE 250: Mod: PN | Performed by: INTERNAL MEDICINE

## 2022-11-18 RX ORDER — SODIUM CHLORIDE 9 MG/ML
1000 INJECTION, SOLUTION INTRAVENOUS CONTINUOUS
Status: ACTIVE | OUTPATIENT
Start: 2022-11-18 | End: 2022-11-18

## 2022-11-18 RX ORDER — SODIUM CHLORIDE 0.9 % (FLUSH) 0.9 %
10 SYRINGE (ML) INJECTION
Status: DISCONTINUED | OUTPATIENT
Start: 2022-11-18 | End: 2022-11-18 | Stop reason: HOSPADM

## 2022-11-18 RX ORDER — HEPARIN 100 UNIT/ML
500 SYRINGE INTRAVENOUS
Status: DISCONTINUED | OUTPATIENT
Start: 2022-11-18 | End: 2022-11-18 | Stop reason: HOSPADM

## 2022-11-18 RX ADMIN — Medication 500 UNITS: at 03:11

## 2022-11-18 RX ADMIN — Medication 10 ML: at 03:11

## 2022-11-18 RX ADMIN — SODIUM CHLORIDE 1000 ML: 0.9 INJECTION, SOLUTION INTRAVENOUS at 01:11

## 2022-11-18 NOTE — PLAN OF CARE
Problem: Adult Inpatient Plan of Care  Goal: Plan of Care Review  Outcome: Ongoing, Progressing  Flowsheets (Taken 11/18/2022 1638)  Plan of Care Reviewed With: patient  Goal: Patient-Specific Goal (Individualized)  Outcome: Ongoing, Progressing  Flowsheets (Taken 11/18/2022 1638)  Anxieties, Fears or Concerns: disability paperwork  Individualized Care Needs: reclinerJULIANNE at chairside  Patient-Specific Goals (Include Timeframe): no s/s of infection during tx     Problem: Fatigue  Goal: Improved Activity Tolerance  Outcome: Ongoing, Progressing  Intervention: Promote Improved Energy  Flowsheets (Taken 11/18/2022 1638)  Activity Management: Up in chair - L3   Pt came in for pump D/C and IVF. Pt tolerated home 5FU well and had no complaints. PAC flushed w/ NS and heparin and deaccessed. Pt verbalized understanding.

## 2022-11-18 NOTE — PROGRESS NOTES
Oncology   Chemotherapy Infusion Visit    Quick Social Service Status Follow Up   Met w/ pt briefly to follow up on social and emotional needs since initiation of treatment. SW met with pt as planned to help complete forms from [employer]. Pt started forms but was having difficulty concentration on the forms. She has been feeling overwhelmed by the forms. The forms were due on 11/17/22 and pt did not realize until today. Pt called the [employer] worker to let her know she was still working on the forms and worker allowed her until Monday to get the forms faxed. SW assisted and attempted to fax the forms in for the pt. The fax did not go through  and after 8 attempts fax was ended. Pt was updated and she will fax the forms again in Monday.   SW provided pt with a Thanksgiving food box. Also informed her SW applying for pt assistance funds on Monday to help with pt's December rent.    SW provided emotional support thought visit.         Radha Giordano, MEDINA  11/18/2022  5:09 PM

## 2022-11-22 ENCOUNTER — TELEPHONE (OUTPATIENT)
Dept: INFUSION THERAPY | Facility: HOSPITAL | Age: 52
End: 2022-11-22
Payer: MEDICAID

## 2022-11-22 NOTE — TELEPHONE ENCOUNTER
SW called pt regarding forms emailed to her for signature. SW needs forms to be signed to submit it for the pt assistance funds to help with rent.     Radha Giordano LCSW     [place]

## 2022-11-25 ENCOUNTER — TELEPHONE (OUTPATIENT)
Dept: HEMATOLOGY/ONCOLOGY | Facility: CLINIC | Age: 52
End: 2022-11-25
Payer: MEDICAID

## 2022-11-28 ENCOUNTER — TELEPHONE (OUTPATIENT)
Dept: INFUSION THERAPY | Facility: HOSPITAL | Age: 52
End: 2022-11-28

## 2022-11-28 ENCOUNTER — PATIENT MESSAGE (OUTPATIENT)
Dept: PSYCHIATRY | Facility: CLINIC | Age: 52
End: 2022-11-28
Payer: MEDICAID

## 2022-11-28 NOTE — PROGRESS NOTES
PROGRESS NOTE    Subjective:       Patient ID: Gail Mckinnon is a 52 y.o. female.  MRN: [id number]  : 1970    Chief Complaint: No chief complaint on file.      History of Present Illness:   Gail Mckinnon is a 52 y.o. female who presents with colon cancer, initially stage III and now with LN recurrence.       She completed adjuvant FOLFOX in 2020. She tolerated only 4 cycles of FOLFOX before she developed an infusion reaction to oxaliplatin in cycle 5 was well as cycle 6. She then completed 6 cycles of infusional 5 FU.     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 She was offered second line therapy with FOLFIRI and Avastin.      She presented to the ED on  with left flank pain. CT renal stone study showed Moderate hydronephrosis on the left secondary to 3 mm calculus at the [facility].    She had a PET scan mid April that showed possible progression of her disease with      She has been to [facility] for another opinion. No change was recommended in systemic therapy but she was offered surgical removal of the aorta caval nodes.  Recent sans at [facility]  show stable disease.      Surgery done 22. Received 2 more cycle of FOLFIRI without Avastin.     She had repeat imaging at [facility] on 22 that unfortunately showed disease progression since her surgery with multiple RP nodes and one mediastinal node.      Interim history:    She presents for a follow up visit. See prior notes for discussion. She has resumed FOLFIRI and Avastin and is here to obtain clearance for ongoing treatment.     Reports her insurance has changed and she is concerned about if [facility] will continue to evaluate her for clinical trial.  Also concerned about if her insurance will cover the PET scan.        Oncology History:  Oncology History   Malignant neoplasm of sigmoid colon   3/16/2020 Initial Diagnosis    Malignant neoplasm of  sigmoid colon     3/31/2020 Cancer Staged    Staging form: Colon and Rectum, AJCC 8th Edition  - Clinical stage from 3/31/2020: Stage IIIC (cT4b, cN2a, cM0)       5/6/2020 - 11/17/2020 Chemotherapy    Treatment Summary   Plan Name: OP FOLFOX 6 Q2W  Treatment Goal: Curative  Status: Inactive  Start Date: 5/6/2020  End Date: 11/6/2020  Provider: Dylan Leyva MD  Chemotherapy: fluorouraciL injection 945 mg, 400 mg/m2 = 945 mg, Intravenous, Clinic/HOD 1 time, 14 of 14 cycles  Administration: 945 mg (5/6/2020), 945 mg (5/20/2020), 945 mg (6/3/2020), 945 mg (6/17/2020), 945 mg (7/29/2020), 945 mg (8/12/2020), 945 mg (8/26/2020), 945 mg (9/9/2020), 945 mg (9/23/2020), 945 mg (10/6/2020), 945 mg (10/21/2020), 945 mg (11/4/2020)  fluorouraciL 2,400 mg/m2 = 5,665 mg in sodium chloride 0.9% 240 mL chemo infusion, 2,400 mg/m2 = 5,665 mg, Intravenous, Over 46 hours, 14 of 14 cycles  Administration: 5,665 mg (5/6/2020), 5,665 mg (5/20/2020), 5,665 mg (6/3/2020), 5,665 mg (6/17/2020), 5,665 mg (7/29/2020), 5,665 mg (8/12/2020), 5,665 mg (8/26/2020), 5,665 mg (9/9/2020), 5,665 mg (9/23/2020), 5,665 mg (10/6/2020), 5,665 mg (10/21/2020), 5,665 mg (11/4/2020)  leucovorin calcium 900 mg in dextrose 5 % 250 mL infusion, 945 mg, Intravenous, Clinic/HOD 1 time, 14 of 14 cycles  Administration: 900 mg (5/6/2020), 900 mg (5/20/2020), 900 mg (6/3/2020), 900 mg (6/17/2020), 945 mg (6/30/2020), 945 mg (7/20/2020), 945 mg (7/29/2020), 945 mg (8/12/2020), 945 mg (8/26/2020), 945 mg (9/9/2020), 945 mg (9/23/2020), 945 mg (10/6/2020), 945 mg (10/21/2020), 945 mg (11/4/2020)  oxaliplatin (ELOXATIN) 200 mg in dextrose 5 % 500 mL chemo infusion, 201 mg, Intravenous, Clinic/\[facility]\"" 1 time, 6 of 6 cycles  Dose modification: 65 mg/m2 (original dose 85 mg/m2, Cycle 6)  Administration: 200 mg (5/6/2020), 200 mg (5/20/2020), 200 mg (6/3/2020), 200 mg (6/17/2020), 201 mg (6/30/2020), 150 mg (7/20/2020)       6/16/2021 -  Chemotherapy    Treatment Summary    Plan Name: OP COLORECTAL FOLFIRI + BEVACIZUMAB Q2W  Treatment Goal: Control  Status: Active  Start Date: 6/16/2021  End Date: 12/2/2022 (Planned)  Provider: Marah Santo MD  Chemotherapy: fluorouraciL injection 990 mg, 400 mg/m2 = 990 mg, Intravenous, Clinic/[facility] 1 time, 15 of 15 cycles  Administration: 990 mg (6/16/2021), 990 mg (6/30/2021), 990 mg (7/14/2021), 980 mg (7/28/2021), 990 mg (8/11/2021), 990 mg (8/25/2021), 990 mg (9/8/2021), 990 mg (9/22/2021), 990 mg (10/6/2021), 990 mg (10/20/2021), 990 mg (11/3/2021), 990 mg (12/1/2021), 990 mg (12/15/2021), 990 mg (11/17/2021), 990 mg (12/28/2021)  fluorouraciL 2,400 mg/m2 = 5,950 mg in sodium chloride 0.9% 240 mL chemo infusion, 2,400 mg/m2 = 5,950 mg, Intravenous, Over 46 hours, 29 of 30 cycles  Administration: 5,950 mg (6/16/2021), 5,950 mg (6/30/2021), 5,950 mg (7/14/2021), 5,880 mg (7/28/2021), 5,930 mg (8/11/2021), 5,930 mg (8/25/2021), 5,930 mg (9/8/2021), 5,930 mg (9/22/2021), 5,930 mg (10/6/2021), 5,930 mg (10/20/2021), 5,930 mg (11/3/2021), 5,930 mg (12/1/2021), 5,930 mg (12/15/2021), 5,930 mg (11/17/2021), 5,930 mg (12/28/2021), 6,050 mg (5/11/2022), 6,025 mg (3/9/2022), 6,025 mg (2/23/2022), 5,930 mg (2/2/2022), 5,930 mg (1/19/2022), 6,050 mg (4/20/2022), 6,025 mg (4/6/2022), 6,025 mg (3/23/2022), 6,050 mg (6/1/2022), 6,050 mg (6/15/2022), 6,050 mg (10/19/2022), 6,050 mg (10/5/2022), 6,050 mg (11/2/2022), 6,050 mg (11/16/2022)  bevacizumab (AVASTIN) 600 mg in sodium chloride 0.9% 100 mL chemo infusion, 660 mg, Intravenous, [facility] 1 time, 27 of 28 cycles  Dose modification: 5 mg/kg (original dose 5 mg/kg, Cycle 26, Reason: MD Discretion, Comment: 5 mg/kg original dose)  Administration: 600 mg (6/30/2021), 600 mg (7/14/2021), 600 mg (7/28/2021), 600 mg (6/16/2021), 600 mg (8/11/2021), 655 mg (8/25/2021), 600 mg (9/8/2021), 600 mg (9/22/2021), 600 mg (10/6/2021), 600 mg (10/20/2021), 600 mg (11/3/2021), 655 mg (12/1/2021), 600 mg (12/15/2021),  600 mg (11/17/2021), 600 mg (12/28/2021), 600 mg (5/11/2022), 600 mg (3/9/2022), 600 mg (2/23/2022), 600 mg (2/2/2022), 600 mg (1/19/2022), 600 mg (4/20/2022), 680 mg (4/6/2022), 600 mg (3/23/2022), 680 mg (10/5/2022), 680 mg (10/19/2022), 680 mg (11/2/2022), 680 mg (11/16/2022)  irinotecan (CAMPTOSAR) 440 mg in sodium chloride 0.9% 500 mL chemo infusion, 446 mg, Intravenous, Clinic[facility] 1 time, 29 of 30 cycles  Dose modification: 180 mg/m2 (original dose 180 mg/m2, Cycle 24)  Administration: 440 mg (6/16/2021), 440 mg (6/30/2021), 440 mg (7/14/2021), 440 mg (7/28/2021), 440 mg (8/11/2021), 444 mg (8/25/2021), 440 mg (9/8/2021), 440 mg (9/22/2021), 440 mg (10/6/2021), 440 mg (10/20/2021), 440 mg (11/3/2021), 440 mg (12/1/2021), 440 mg (12/15/2021), 440 mg (11/17/2021), 440 mg (12/28/2021), 440 mg (5/11/2022), 440 mg (3/9/2022), 440 mg (2/23/2022), 440 mg (2/2/2022), 440 mg (1/19/2022), 440 mg (4/20/2022), 440 mg (4/6/2022), 440 mg (3/24/2022), 440 mg (6/1/2022), 440 mg (6/15/2022), 440 mg (10/19/2022), 440 mg (10/5/2022), 440 mg (11/2/2022), 440 mg (11/16/2022)       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   4/3/2020 Initial Diagnosis    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     5/6/2020 - 11/17/2020 Chemotherapy    Treatment Summary   Plan Name: OP FOLFOX 6 Q2W  Treatment Goal: Curative  Status: Inactive  Start Date: 5/6/2020  End Date: 11/6/2020  Provider: Dylan Leyva MD  Chemotherapy: fluorouraciL injection 945 mg, 400 mg/m2 = 945 mg, Intravenous, Clinic/[facility] 1 time, 14 of 14 cycles  Administration: 945 mg (5/6/2020), 945 mg (5/20/2020), 945 mg (6/3/2020), 945 mg (6/17/2020), 945 mg (7/29/2020), 945 mg (8/12/2020), 945 mg (8/26/2020), 945 mg (9/9/2020), 945 mg (9/23/2020), 945 mg (10/6/2020), 945 mg (10/21/2020), 945 mg (11/4/2020)  fluorouraciL 2,400 mg/m2 = 5,665 mg in sodium chloride 0.9% 240 mL chemo infusion, 2,400 mg/m2 = 5,665 mg, Intravenous, Over 46 hours, 14 of 14 cycles  Administration: 5,665 mg  (5/6/2020), 5,665 mg (5/20/2020), 5,665 mg (6/3/2020), 5,665 mg (6/17/2020), 5,665 mg (7/29/2020), 5,665 mg (8/12/2020), 5,665 mg (8/26/2020), 5,665 mg (9/9/2020), 5,665 mg (9/23/2020), 5,665 mg (10/6/2020), 5,665 mg (10/21/2020), 5,665 mg (11/4/2020)  leucovorin calcium 900 mg in dextrose 5 % 250 mL infusion, 945 mg, Intravenous, Clinic/H[facility] 1 time, 14 of 14 cycles  Administration: 900 mg (5/6/2020), 900 mg (5/20/2020), 900 mg (6/3/2020), 900 mg (6/17/2020), 945 mg (6/30/2020), 945 mg (7/20/2020), 945 mg (7/29/2020), 945 mg (8/12/2020), 945 mg (8/26/2020), 945 mg (9/9/2020), 945 mg (9/23/2020), 945 mg (10/6/2020), 945 mg (10/21/2020), 945 mg (11/4/2020)  oxaliplatin (ELOXATIN) 200 mg in dextrose 5 % 500 mL chemo infusion, 201 mg, Intravenous, Clinic/[facility] 1 time, 6 of 6 cycles  Dose modification: 65 mg/m2 (original dose 85 mg/m2, Cycle 6)  Administration: 200 mg (5/6/2020), 200 mg (5/20/2020), 200 mg (6/3/2020), 200 mg (6/17/2020), 201 mg (6/30/2020), 150 mg (7/20/2020)       6/16/2021 -  Chemotherapy    Treatment Summary   Plan Name: OP COLORECTAL FOLFIRI + BEVACIZUMAB Q2W  Treatment Goal: Control  Status: Active  Start Date: 6/16/2021  End Date: 12/2/2022 (Planned)  Provider: Marah Santo MD  Chemotherapy: fluorouraciL injection 990 mg, 400 mg/m2 = 990 mg, Intravenous, Clinic/[facility] 1 time, 15 of 15 cycles  Administration: 990 mg (6/16/2021), 990 mg (6/30/2021), 990 mg (7/14/2021), 980 mg (7/28/2021), 990 mg (8/11/2021), 990 mg (8/25/2021), 990 mg (9/8/2021), 990 mg (9/22/2021), 990 mg (10/6/2021), 990 mg (10/20/2021), 990 mg (11/3/2021), 990 mg (12/1/2021), 990 mg (12/15/2021), 990 mg (11/17/2021), 990 mg (12/28/2021)  fluorouraciL 2,400 mg/m2 = 5,950 mg in sodium chloride 0.9% 240 mL chemo infusion, 2,400 mg/m2 = 5,950 mg, Intravenous, Over 46 hours, 29 of 30 cycles  Administration: 5,950 mg (6/16/2021), 5,950 mg (6/30/2021), 5,950 mg (7/14/2021), 5,880 mg (7/28/2021), 5,930 mg (8/11/2021), 5,930 mg (8/25/2021),  5,930 mg (9/8/2021), 5,930 mg (9/22/2021), 5,930 mg (10/6/2021), 5,930 mg (10/20/2021), 5,930 mg (11/3/2021), 5,930 mg (12/1/2021), 5,930 mg (12/15/2021), 5,930 mg (11/17/2021), 5,930 mg (12/28/2021), 6,050 mg (5/11/2022), 6,025 mg (3/9/2022), 6,025 mg (2/23/2022), 5,930 mg (2/2/2022), 5,930 mg (1/19/2022), 6,050 mg (4/20/2022), 6,025 mg (4/6/2022), 6,025 mg (3/23/2022), 6,050 mg (6/1/2022), 6,050 mg (6/15/2022), 6,050 mg (10/19/2022), 6,050 mg (10/5/2022), 6,050 mg (11/2/2022), 6,050 mg (11/16/2022)  bevacizumab (AVASTIN) 600 mg in sodium chloride 0.9% 100 mL chemo infusion, 660 mg, Intravenous, [facility] 1 time, 27 of 28 cycles  Dose modification: 5 mg/kg (original dose 5 mg/kg, Cycle 26, Reason: MD Discretion, Comment: 5 mg/kg original dose)  Administration: 600 mg (6/30/2021), 600 mg (7/14/2021), 600 mg (7/28/2021), 600 mg (6/16/2021), 600 mg (8/11/2021), 655 mg (8/25/2021), 600 mg (9/8/2021), 600 mg (9/22/2021), 600 mg (10/6/2021), 600 mg (10/20/2021), 600 mg (11/3/2021), 655 mg (12/1/2021), 600 mg (12/15/2021), 600 mg (11/17/2021), 600 mg (12/28/2021), 600 mg (5/11/2022), 600 mg (3/9/2022), 600 mg (2/23/2022), 600 mg (2/2/2022), 600 mg (1/19/2022), 600 mg (4/20/2022), 680 mg (4/6/2022), 600 mg (3/23/2022), 680 mg (10/5/2022), 680 mg (10/19/2022), 680 mg (11/2/2022), 680 mg (11/16/2022)  irinotecan (CAMPTOSAR) 440 mg in sodium chloride 0.9% 500 mL chemo infusion, 446 mg, Intravenous, Clinic/[facility] 1 time, 29 of 30 cycles  Dose modification: 180 mg/m2 (original dose 180 mg/m2, Cycle 24)  Administration: 440 mg (6/16/2021), 440 mg (6/30/2021), 440 mg (7/14/2021), 440 mg (7/28/2021), 440 mg (8/11/2021), 444 mg (8/25/2021), 440 mg (9/8/2021), 440 mg (9/22/2021), 440 mg (10/6/2021), 440 mg (10/20/2021), 440 mg (11/3/2021), 440 mg (12/1/2021), 440 mg (12/15/2021), 440 mg (11/17/2021), 440 mg (12/28/2021), 440 mg (5/11/2022), 440 mg (3/9/2022), 440 mg (2/23/2022), 440 mg (2/2/2022), 440 mg (1/19/2022), 440 mg  (2022), 440 mg (2022), 440 mg (3/24/2022), 440 mg (2022), 440 mg (6/15/2022), 440 mg (10/19/2022), 440 mg (10/5/2022), 440 mg (2022), 440 mg (2022)           History:  Past Medical History:   Diagnosis Date    Anxiety     Depression     FH: ovarian cancer 3/16/2020    Hx of psychiatric care     Effexor, Paxil, Lexapro, Zoloft, Wellbutrin, Trazodone Buspar    Hyperthyroidism     Hypothyroid     Kidney calculi     Malignant neoplasm of sigmoid colon 3/16/2020    Menorrhagia     Multinodular goiter 2012    Palpitation     Psychiatric problem     Venous insufficiency       Past Surgical History:   Procedure Laterality Date     SECTION, CLASSIC      x3    COLONOSCOPY N/A 2020    Procedure: COLONOSCOPY;  Surgeon: Shane Parker MD;  Location: [facility];  Service: Endoscopy;  Laterality: N/A;    COLONOSCOPY N/A 2022    Procedure: COLONOSCOPY;  Surgeon: Shane Parker MD;  Location: [facility];  Service: Endoscopy;  Laterality: N/A;    CYSTOSCOPY W/ URETERAL STENT PLACEMENT Bilateral 3/25/2020    Procedure: CYSTOSCOPY, WITH URETERAL STENT INSERTION;  Surgeon: Claudio Tyson MD;  Location: [facility] OR [address] [place] [address];  Service: Urology;  Laterality: Bilateral;    INSERTION OF TUNNELED CENTRAL VENOUS CATHETER (CVC) WITH SUBCUTANEOUS PORT N/A 2020    Procedure: [id number];  Surgeon: [facility] Diagnostic Provider;  Location: [facility] OR [address] [place] [address];  Service: Radiology;  Laterality: N/A;  Room 189/Cindy    LAPAROSCOPIC SIGMOIDECTOMY N/A 3/25/2020    Procedure: COLECTOMY, SIGMOID, LAPAROSCOPIC, flex sig, ERAS high;  Surgeon: Silvio Man MD;  [facility];  Service: Colon and Rectal;  Laterality: N/A;    MOBILIZATION OF SPLENIC FLEXURE  3/25/2020    Procedure: MOBILIZATION, SPLENIC FLEXURE;  Surgeon: Silvio Man MD;  Location: [facility] OR [address] [place] [address];  Service: Colon and Rectal;;    tonsillectomy      TOTAL ABDOMINAL HYSTERECTOMY W/ BILATERAL  SALPINGOOPHORECTOMY N/A 3/25/2020    Procedure: HYSTERECTOMY, TOTAL, ABDOMINAL, WITH BILATERAL SALPINGO-OOPHORECTOMY;  Surgeon: Keron Brady MD;  Location: [facility] OR [address] [place] [address];  Service: Oncology;  Laterality: N/A;     Family History   Problem Relation Age of Onset    Heart disease Father     Diabetes Mother 65    Drug abuse Brother     Drug abuse Brother     Cancer Maternal Aunt         lung cancer    Cancer Maternal Grandmother         stomach cancer- started in ovaries    Ovarian cancer Maternal Grandmother         stomach cancer- started in ovaries    Colon cancer Paternal Uncle     Cancer Paternal Uncle     Ovarian cancer Maternal Aunt     Ovarian cancer Maternal Aunt     Ovarian cancer Cousin         mother had ovarian cancer    Drug abuse Son     Drug abuse Son         clean/sober since 2012    Colon cancer Son     No Known Problems Daughter     Uterine cancer Neg Hx     Breast cancer Neg Hx      Social History     Tobacco Use    Smoking status: Never    Smokeless tobacco: Never   Substance and Sexual Activity    Alcohol use: Yes     Comment: occasionally- twice monthly    Drug use: Never    Sexual activity: Yes     Partners: Male     Birth control/protection: See Surgical Hx          Objective:     There were no vitals filed for this visit.          Wt Readings from Last 10 Encounters:   11/18/22 134 kg (295 lb 6.7 oz)   11/16/22 134 kg (295 lb 6.7 oz)   11/14/22 133.7 kg (294 lb 12.1 oz)   11/04/22 133 kg (293 lb 3.4 oz)   11/02/22 133 kg (293 lb 3.4 oz)   10/31/22 133 kg (293 lb 3.4 oz)   10/19/22 133.3 kg (293 lb 14 oz)   10/17/22 133.3 kg (293 lb 14 oz)   10/05/22 132.7 kg (292 lb 8.8 oz)   10/03/22 132.7 kg (292 lb 8.8 oz)       Physical Examination:   Physical Exam   General:  Well-appearing, nontoxic  Eyes:  Equal and round pupils, EOMI, no scleral icterus  Mouth:  No lesions, moist  Cardiovascular:  Warm, well-perfused, no peripheral edema  Lungs:  Unlabored on room air, no wheezing  Neurologic:   Awake, alert and oriented, participating in the exam  Psych:  Appropriate mood and affect  Skin:  Normal pallor, No rashes  Heme:  No petechiae, no purpura       Diagnostic Tests:  Significant Imaging: I have reviewed and interpreted all pertinent imaging results/findings.  Laboratory Data:  All pertinent labs have been reviewed.  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 Oxaliplatin was stopped due to severe adverse reaction.     Follow-up CTs and PET in May 2021 showed enlarging retroperitoneal node, concerning for metastatic disease.      She started FOLFIRI + Avastin and has continued till July 2022.   Given isolated RP toni disease, she has undergone open Left periaortic and aortocaval lymph node dissection on 7/12/2022.     Follow up scans show disease progression. I have discussed the case with Dr. Montelongo at [facility]. We discussed resuming FOLFIRI-debo vs transitioning to BRAF directed therapy (BEACON).   There may be an option ton enroll in SWOG 2107 (encorafenib/cetuximab +/- nivo) at [facility] if no prior BRAF inhibitor exposure.     Patient is interested in the trial and therefore will resume FOLFIRI- Debo at this time and repeat scans in December. Ordered PET on 11/29/22.  By then, hope to enroll in the trial at [facility].  Please note her insurance has changed and she  is concerned about her ability to continue to be evaluated at [facility].    Cleared to receive treatment. RTC 2 weeks.     Nausea   Improving on compazine.     Mucositis   Continue [facility] soln.     Transaminitis  Fluctuates.  Continue to monitor. No obvious liver mets.     Hypercalcemia   Mild, secondary to hyperparathyroidism.  Avoid calcium supplements.     Hypothyroidism  Multinodular goiter   Continue Levothyroxine. Endocrine consult is appreciated.   Had a non diagnostic biopsy in 2012, usg findings have remained stable. However, given uptake on PET this year, an FNA vs repeat usg in 6 months is recommended. Will follow closely.     Essential HTN   Continue antihypertensives.      Anxiety   Continue to  follow up with Dr. Palm, saw him today.     MDM includes  :    - Acute or chronic illness or injury that poses a threat to life or bodily function  - Independent review and explanation of 3+ results from unique tests  - Discussion of management and ordering 3+ unique tests  - Extensive discussion of treatment and management  - Prescription drug management  - Drug therapy requiring intensive monitoring for toxicity          ECOG SCORE               Discussion:   No follow-ups on file.    Plan was discussed with the patient at length, and she verbalized understanding. Gail was given an opportunity to ask questions that were answered to her satisfaction, and she was advised to call in the interval if any problems or questions arise.    Electronically signed by Kassidy Harrison MD        Route Chart for Scheduling    Med Onc Chart Routing      Follow up with physician 2 weeks. with Dr. Santo   Follow up with TAVO    Infusion scheduling note    Injection scheduling note    Labs    Imaging    Pharmacy appointment    Other referrals        Treatment Plan Information   OP COLORECTAL FOLFIRI + BEVACIZUMAB Q2W   Marah Santo MD   Upcoming Treatment Dates - OP COLORECTAL FOLFIRI + BEVACIZUMAB Q2W    11/30/2022        Pre-Medications       LORazepam injection 1 mg       promethazine (PHENERGAN) 6.25 mg in dextrose 5 % 100 mL IVPB       palonosetron 0.25mg/dexAMETHasone 20mg in NS IVPB       Chemotherapy       bevacizumab (AVASTIN) 5 mg/kg = 680 mg in sodium chloride 0.9% 100 mL chemo infusion       irinotecan (CAMPTOSAR) 440 mg in sodium chloride 0.9% 522 mL chemo infusion       leucovorin calcium 400 mg/m2 = 1,010 mg in dextrose 5 % 250 mL infusion       fluorouracil (ADRUCIL) 2,400 mg/m2 = 6,050 mg in sodium chloride 0.9% 240 mL chemo infusion       Supportive Care       atropine injection 0.4 mg    Supportive Plan Information  IV FLUIDS AND ELECTROLYTES   Brayden Solo MD   Upcoming Treatment Dates - IV FLUIDS AND ELECTROLYTES    No upcoming days in selected categories.    Therapy Plan Information  PORT FLUSH  Flushes  heparin, porcine (PF) 100 unit/mL injection flush 500 Units  500 Units, Intravenous, Every visit  sodium chloride 0.9% flush 10 mL  10 mL, Intravenous, Every visit      Answers submitted by the patient for this visit:  Review of Systems Questionnaire (Submitted on 11/13/2022)  appetite change : No  unexpected weight change: No  mouth sores: Yes  visual disturbance: No  adenopathy: No

## 2022-11-28 NOTE — TELEPHONE ENCOUNTER
Spoke with patient reschedule her appointment from today to tomorrow    [[email or web address]] : Mr. Don is a 57 year old male with CLL diagnosed in 2014. His CLL is unmutated with TP53 mutation. He presents for follow up on [id number]. This is cycle 23 visit.\par  \par Plan:\par 1)CLL:\par \par Counts stable, continue study drugs as per protocol. Patient given cycle 23 day #1 of study in clinic at 12:05 PM. We have reviewed most recent CT scans with patient-all lymph nodes stable. Most recent MRD testing showed 0.02%, therefore patient will be MRD tested again today. \par \par Patient was re-consented to study [id number] using the MRD Cohort ICF Version H, dated through 01/16/2020, due updated side effects related to ibrutinib and study assessment/administrative changes to the protocol (Amendment 2). All of the patient’s questions were answered and the patient wishes to continue participating in the study. The patient received a copy of their signed consent\par \par 2)hypertension: continue losartan 50mg daily as prescribed\par \par RTO per protocl on July 16, 2019 -/+3 days, sooner prn

## 2022-11-29 ENCOUNTER — OFFICE VISIT (OUTPATIENT)
Dept: HEMATOLOGY/ONCOLOGY | Facility: CLINIC | Age: 52
End: 2022-11-29
Payer: MEDICAID

## 2022-11-29 ENCOUNTER — LAB VISIT (OUTPATIENT)
Dept: LAB | Facility: HOSPITAL | Age: 52
End: 2022-11-29
Payer: MEDICAID

## 2022-11-29 ENCOUNTER — OFFICE VISIT (OUTPATIENT)
Dept: PSYCHIATRY | Facility: CLINIC | Age: 52
End: 2022-11-29
Payer: MEDICAID

## 2022-11-29 VITALS
TEMPERATURE: 97 F | HEART RATE: 88 BPM | BODY MASS INDEX: 47.09 KG/M2 | SYSTOLIC BLOOD PRESSURE: 144 MMHG | RESPIRATION RATE: 18 BRPM | OXYGEN SATURATION: 97 % | DIASTOLIC BLOOD PRESSURE: 86 MMHG | WEIGHT: 293 LBS | HEIGHT: 66 IN

## 2022-11-29 DIAGNOSIS — C18.7 MALIGNANT NEOPLASM OF SIGMOID COLON: Primary | ICD-10-CM

## 2022-11-29 DIAGNOSIS — G89.29 CHRONIC ABDOMINAL PAIN: ICD-10-CM

## 2022-11-29 DIAGNOSIS — R10.9 CHRONIC ABDOMINAL PAIN: ICD-10-CM

## 2022-11-29 DIAGNOSIS — F41.1 ANXIETY ASSOCIATED WITH CANCER DIAGNOSIS: ICD-10-CM

## 2022-11-29 DIAGNOSIS — C18.7 MALIGNANT NEOPLASM OF SIGMOID COLON: ICD-10-CM

## 2022-11-29 DIAGNOSIS — F33.1 MODERATE EPISODE OF RECURRENT MAJOR DEPRESSIVE DISORDER: Primary | ICD-10-CM

## 2022-11-29 DIAGNOSIS — C80.1 ANXIETY ASSOCIATED WITH CANCER DIAGNOSIS: ICD-10-CM

## 2022-11-29 DIAGNOSIS — G47.00 INSOMNIA, UNSPECIFIED TYPE: ICD-10-CM

## 2022-11-29 LAB
ALBUMIN SERPL BCP-MCNC: 3.6 G/DL (ref 3.5–5.2)
ALP SERPL-CCNC: 178 U/L (ref 55–135)
ALT SERPL W/O P-5'-P-CCNC: 67 U/L (ref 10–44)
ANION GAP SERPL CALC-SCNC: 7 MMOL/L (ref 8–16)
AST SERPL-CCNC: 42 U/L (ref 10–40)
BASOPHILS # BLD AUTO: 0.04 K/UL (ref 0–0.2)
BASOPHILS NFR BLD: 0.7 % (ref 0–1.9)
BILIRUB SERPL-MCNC: 0.4 MG/DL (ref 0.1–1)
BUN SERPL-MCNC: 20 MG/DL (ref 6–20)
CALCIUM SERPL-MCNC: 10.4 MG/DL (ref 8.7–10.5)
CHLORIDE SERPL-SCNC: 109 MMOL/L (ref 95–110)
CO2 SERPL-SCNC: 23 MMOL/L (ref 23–29)
CREAT SERPL-MCNC: 1.1 MG/DL (ref 0.5–1.4)
DIFFERENTIAL METHOD: ABNORMAL
EOSINOPHIL # BLD AUTO: 0.4 K/UL (ref 0–0.5)
EOSINOPHIL NFR BLD: 5.8 % (ref 0–8)
ERYTHROCYTE [DISTWIDTH] IN BLOOD BY AUTOMATED COUNT: 18 % (ref 11.5–14.5)
EST. GFR  (NO RACE VARIABLE): 59.7 ML/MIN/1.73 M^2
GLUCOSE SERPL-MCNC: 125 MG/DL (ref 70–110)
HCT VFR BLD AUTO: 44.9 % (ref 37–48.5)
HGB BLD-MCNC: 14.5 G/DL (ref 12–16)
IMM GRANULOCYTES # BLD AUTO: 0.02 K/UL (ref 0–0.04)
IMM GRANULOCYTES NFR BLD AUTO: 0.3 % (ref 0–0.5)
LYMPHOCYTES # BLD AUTO: 1.5 K/UL (ref 1–4.8)
LYMPHOCYTES NFR BLD: 25.1 % (ref 18–48)
MCH RBC QN AUTO: 28.3 PG (ref 27–31)
MCHC RBC AUTO-ENTMCNC: 32.3 G/DL (ref 32–36)
MCV RBC AUTO: 88 FL (ref 82–98)
MONOCYTES # BLD AUTO: 0.5 K/UL (ref 0.3–1)
MONOCYTES NFR BLD: 8.2 % (ref 4–15)
NEUTROPHILS # BLD AUTO: 3.6 K/UL (ref 1.8–7.7)
NEUTROPHILS NFR BLD: 59.9 % (ref 38–73)
NRBC BLD-RTO: 0 /100 WBC
PLATELET # BLD AUTO: 248 K/UL (ref 150–450)
PMV BLD AUTO: 9.7 FL (ref 9.2–12.9)
POTASSIUM SERPL-SCNC: 4.1 MMOL/L (ref 3.5–5.1)
PROT SERPL-MCNC: 7.1 G/DL (ref 6–8.4)
RBC # BLD AUTO: 5.12 M/UL (ref 4–5.4)
SODIUM SERPL-SCNC: 139 MMOL/L (ref 136–145)
WBC # BLD AUTO: 6.01 K/UL (ref 3.9–12.7)

## 2022-11-29 PROCEDURE — 3008F PR BODY MASS INDEX (BMI) DOCUMENTED: ICD-10-PCS | Mod: CPTII,,, | Performed by: INTERNAL MEDICINE

## 2022-11-29 PROCEDURE — 99999 PR PBB SHADOW E&M-EST. PATIENT-LVL IV: ICD-10-PCS | Mod: PBBFAC,,, | Performed by: INTERNAL MEDICINE

## 2022-11-29 PROCEDURE — 99215 PR OFFICE/OUTPT VISIT, EST, LEVL V, 40-54 MIN: ICD-10-PCS | Mod: S$PBB,,, | Performed by: INTERNAL MEDICINE

## 2022-11-29 PROCEDURE — 1159F MED LIST DOCD IN RCRD: CPT | Mod: CPTII,,, | Performed by: INTERNAL MEDICINE

## 2022-11-29 PROCEDURE — 4010F ACE/ARB THERAPY RXD/TAKEN: CPT | Mod: CPTII,,, | Performed by: INTERNAL MEDICINE

## 2022-11-29 PROCEDURE — 99215 OFFICE O/P EST HI 40 MIN: CPT | Mod: S$PBB,,, | Performed by: INTERNAL MEDICINE

## 2022-11-29 PROCEDURE — 3008F BODY MASS INDEX DOCD: CPT | Mod: CPTII,,, | Performed by: INTERNAL MEDICINE

## 2022-11-29 PROCEDURE — 90834 PR PSYCHOTHERAPY W/PATIENT, 45 MIN: ICD-10-PCS | Mod: AH,HB,, | Performed by: PSYCHOLOGIST

## 2022-11-29 PROCEDURE — 36415 COLL VENOUS BLD VENIPUNCTURE: CPT | Mod: PN | Performed by: INTERNAL MEDICINE

## 2022-11-29 PROCEDURE — 85025 COMPLETE CBC W/AUTO DIFF WBC: CPT | Mod: PN | Performed by: INTERNAL MEDICINE

## 2022-11-29 PROCEDURE — 99999 PR PBB SHADOW E&M-EST. PATIENT-LVL I: ICD-10-PCS | Mod: PBBFAC,HB,, | Performed by: PSYCHOLOGIST

## 2022-11-29 PROCEDURE — 1159F PR MEDICATION LIST DOCUMENTED IN MEDICAL RECORD: ICD-10-PCS | Mod: CPTII,,, | Performed by: INTERNAL MEDICINE

## 2022-11-29 PROCEDURE — 3079F DIAST BP 80-89 MM HG: CPT | Mod: CPTII,,, | Performed by: INTERNAL MEDICINE

## 2022-11-29 PROCEDURE — 4010F PR ACE/ARB THEARPY RXD/TAKEN: ICD-10-PCS | Mod: CPTII,,, | Performed by: INTERNAL MEDICINE

## 2022-11-29 PROCEDURE — 3079F PR MOST RECENT DIASTOLIC BLOOD PRESSURE 80-89 MM HG: ICD-10-PCS | Mod: CPTII,,, | Performed by: INTERNAL MEDICINE

## 2022-11-29 PROCEDURE — 3077F PR MOST RECENT SYSTOLIC BLOOD PRESSURE >= 140 MM HG: ICD-10-PCS | Mod: CPTII,,, | Performed by: INTERNAL MEDICINE

## 2022-11-29 PROCEDURE — 4010F ACE/ARB THERAPY RXD/TAKEN: CPT | Mod: AH,HB,CPTII, | Performed by: PSYCHOLOGIST

## 2022-11-29 PROCEDURE — 4010F PR ACE/ARB THEARPY RXD/TAKEN: ICD-10-PCS | Mod: AH,HB,CPTII, | Performed by: PSYCHOLOGIST

## 2022-11-29 PROCEDURE — 99211 OFF/OP EST MAY X REQ PHY/QHP: CPT | Mod: PBBFAC,27,PN | Performed by: PSYCHOLOGIST

## 2022-11-29 PROCEDURE — 99999 PR PBB SHADOW E&M-EST. PATIENT-LVL I: CPT | Mod: PBBFAC,HB,, | Performed by: PSYCHOLOGIST

## 2022-11-29 PROCEDURE — 90834 PSYTX W PT 45 MINUTES: CPT | Mod: AH,HB,, | Performed by: PSYCHOLOGIST

## 2022-11-29 PROCEDURE — 80053 COMPREHEN METABOLIC PANEL: CPT | Mod: PN | Performed by: INTERNAL MEDICINE

## 2022-11-29 PROCEDURE — 99999 PR PBB SHADOW E&M-EST. PATIENT-LVL IV: CPT | Mod: PBBFAC,,, | Performed by: INTERNAL MEDICINE

## 2022-11-29 PROCEDURE — 99214 OFFICE O/P EST MOD 30 MIN: CPT | Mod: PBBFAC,PN | Performed by: INTERNAL MEDICINE

## 2022-11-29 PROCEDURE — 3077F SYST BP >= 140 MM HG: CPT | Mod: CPTII,,, | Performed by: INTERNAL MEDICINE

## 2022-11-29 RX ORDER — ATROPINE SULFATE 0.4 MG/ML
0.4 INJECTION, SOLUTION ENDOTRACHEAL; INTRAMEDULLARY; INTRAMUSCULAR; INTRAVENOUS; SUBCUTANEOUS ONCE AS NEEDED
Status: CANCELLED | OUTPATIENT
Start: 2022-11-30

## 2022-11-29 RX ORDER — TRAMADOL HYDROCHLORIDE 50 MG/1
50 TABLET ORAL 3 TIMES DAILY PRN
Qty: 90 TABLET | Refills: 0 | Status: ON HOLD | OUTPATIENT
Start: 2022-11-29 | End: 2023-05-12 | Stop reason: HOSPADM

## 2022-11-29 RX ORDER — HEPARIN 100 UNIT/ML
500 SYRINGE INTRAVENOUS
Status: CANCELLED | OUTPATIENT
Start: 2022-11-30

## 2022-11-29 RX ORDER — LORAZEPAM 2 MG/ML
1 INJECTION INTRAMUSCULAR
Status: CANCELLED | OUTPATIENT
Start: 2022-11-30 | End: 2022-11-30

## 2022-11-29 RX ORDER — TRAMADOL HYDROCHLORIDE 50 MG/1
50 TABLET ORAL 3 TIMES DAILY PRN
Qty: 90 TABLET | Refills: 0 | Status: CANCELLED | OUTPATIENT
Start: 2022-11-29

## 2022-11-29 RX ORDER — TRAZODONE HYDROCHLORIDE 50 MG/1
50 TABLET ORAL NIGHTLY
Qty: 30 TABLET | Refills: 2 | Status: SHIPPED | OUTPATIENT
Start: 2022-11-29 | End: 2023-09-15 | Stop reason: SDUPTHER

## 2022-11-29 RX ORDER — SODIUM CHLORIDE 0.9 % (FLUSH) 0.9 %
10 SYRINGE (ML) INJECTION
Status: CANCELLED | OUTPATIENT
Start: 2022-11-30

## 2022-11-29 NOTE — PROGRESS NOTES
PSYCHO-ONCOLOGY NOTE/ Individual Psychotherapy     Date: 11/29/2022   Site:  [facility]      Therapeutic Intervention: Met with patient.  Outpatient - Supportive psychotherapy 45 min - CPT Code 37712    This includes face to face time and non-face to face time preparing to see the patient, obtaining and/or reviewing separately obtained history, documenting clinical information in the electronic or other health record, independently interpreting results and communicating results to the patient/family/caregiver, or care coordinator.      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    Anxiety    Insomnia    Insomnia    Anxiety    Other constipation    Chemotherapy-induced nausea    Generalized anxiety disorder with panic attacks    Chemotherapy adverse reaction    Mucositis due to chemotherapy    Morbid obesity    Secondary adenocarcinoma of lymph node    Thyroid nodule    Hypercalcemia    Transaminitis    Dysuria    Chemotherapy-induced neutropenia    Hypophosphatemia    Functional diarrhea    Primary hypertension       Chief complaint/reason for encounter: adjustment to illness, depression and anxiety      Met with patient to evaluate psychosocial adaptation to diagnosis/treatment of metastatic colon cancer    Current Medications  Current Outpatient Medications   Medication    acetaminophen (TYLENOL) 500 MG tablet    buPROPion (WELLBUTRIN SR) 150 MG TBSR 12 hr tablet    busPIRone (BUSPAR) 10 MG tablet    granisetron (SANCUSO) 3.1 mg/24 hour    Lactobacillus rhamnosus GG (CULTURELLE) 10 billion cell capsule    lactulose  (CHRONULAC) 10 gram/15 mL solution    levothyroxine (SYNTHROID) 200 MCG tablet    LIDOcaine-prilocaine (EMLA) cream    LORazepam (ATIVAN) 1 MG tablet    magic mouthwash diphen/antac/lidoc/nysta    multivitamin (THERAGRAN) per tablet    olmesartan (BENICAR) 20 MG tablet    ondansetron (ZOFRAN-ODT) 8 MG TbDL    prochlorperazine (COMPAZINE) 10 MG tablet    promethazine (PHENERGAN) 12.5 MG Tab    senna-docusate 8.6-50 mg (PERICOLACE) 8.6-50 mg per tablet    tamsulosin (FLOMAX) 0.4 mg Cap    traMADoL (ULTRAM) 50 mg tablet    traZODone (DESYREL) 50 MG tablet     No current facility-administered medications for this visit.       ONCOLOGY HISTORY  Oncology History   Malignant neoplasm of sigmoid colon   3/16/2020 Initial Diagnosis    Malignant neoplasm of sigmoid colon     3/31/2020 Cancer Staged    Staging form: Colon and Rectum, AJCC 8th Edition  - Clinical stage from 3/31/2020: Stage IIIC (cT4b, cN2a, cM0)       5/6/2020 - 11/17/2020 Chemotherapy    Treatment Summary   Plan Name: OP FOLFOX 6 Q2W  Treatment Goal: Curative  Status: Inactive  Start Date: 5/6/2020  End Date: 11/6/2020  Provider: Dylan Leyva MD  Chemotherapy: fluorouraciL injection 945 mg, 400 mg/m2 = 945 mg, Intravenous, Clinic/[facility] 1 time, 14 of 14 cycles  Administration: 945 mg (5/6/2020), 945 mg (5/20/2020), 945 mg (6/3/2020), 945 mg (6/17/2020), 945 mg (7/29/2020), 945 mg (8/12/2020), 945 mg (8/26/2020), 945 mg (9/9/2020), 945 mg (9/23/2020), 945 mg (10/6/2020), 945 mg (10/21/2020), 945 mg (11/4/2020)  fluorouraciL 2,400 mg/m2 = 5,665 mg in sodium chloride 0.9% 240 mL chemo infusion, 2,400 mg/m2 = 5,665 mg, Intravenous, Over 46 hours, 14 of 14 cycles  Administration: 5,665 mg (5/6/2020), 5,665 mg (5/20/2020), 5,665 mg (6/3/2020), 5,665 mg (6/17/2020), 5,665 mg (7/29/2020), 5,665 mg (8/12/2020), 5,665 mg (8/26/2020), 5,665 mg (9/9/2020), 5,665 mg (9/23/2020), 5,665 mg (10/6/2020), 5,665 mg (10/21/2020), 5,665 mg (11/4/2020)  leucovorin calcium 900 mg  in dextrose 5 % 250 mL infusion, 945 mg, Intravenous, [facility] 1 time, 14 of 14 cycles  Administration: 900 mg (5/6/2020), 900 mg (5/20/2020), 900 mg (6/3/2020), 900 mg (6/17/2020), 945 mg (6/30/2020), 945 mg (7/20/2020), 945 mg (7/29/2020), 945 mg (8/12/2020), 945 mg (8/26/2020), 945 mg (9/9/2020), 945 mg (9/23/2020), 945 mg (10/6/2020), 945 mg (10/21/2020), 945 mg (11/4/2020)  oxaliplatin (ELOXATIN) 200 mg in dextrose 5 % 500 mL chemo infusion, 201 mg, Intravenous, [facility] 1 time, 6 of 6 cycles  Dose modification: 65 mg/m2 (original dose 85 mg/m2, Cycle 6)  Administration: 200 mg (5/6/2020), 200 mg (5/20/2020), 200 mg (6/3/2020), 200 mg (6/17/2020), 201 mg (6/30/2020), 150 mg (7/20/2020)       6/16/2021 -  Chemotherapy    Treatment Summary   Plan Name: OP COLORECTAL FOLFIRI + BEVACIZUMAB Q2W  Treatment Goal: Control  Status: Active  Start Date: 6/16/2021  End Date: 12/2/2022 (Planned)  Provider: Marah Santo MD  Chemotherapy: fluorouraciL injection 990 mg, 400 mg/m2 = 990 mg, Intravenous, Clinic/[facility] 1 time, 15 of 15 cycles  Administration: 990 mg (6/16/2021), 990 mg (6/30/2021), 990 mg (7/14/2021), 980 mg (7/28/2021), 990 mg (8/11/2021), 990 mg (8/25/2021), 990 mg (9/8/2021), 990 mg (9/22/2021), 990 mg (10/6/2021), 990 mg (10/20/2021), 990 mg (11/3/2021), 990 mg (12/1/2021), 990 mg (12/15/2021), 990 mg (11/17/2021), 990 mg (12/28/2021)  fluorouraciL 2,400 mg/m2 = 5,950 mg in sodium chloride 0.9% 240 mL chemo infusion, 2,400 mg/m2 = 5,950 mg, Intravenous, Over 46 hours, 29 of 30 cycles  Administration: 5,950 mg (6/16/2021), 5,950 mg (6/30/2021), 5,950 mg (7/14/2021), 5,880 mg (7/28/2021), 5,930 mg (8/11/2021), 5,930 mg (8/25/2021), 5,930 mg (9/8/2021), 5,930 mg (9/22/2021), 5,930 mg (10/6/2021), 5,930 mg (10/20/2021), 5,930 mg (11/3/2021), 5,930 mg (12/1/2021), 5,930 mg (12/15/2021), 5,930 mg (11/17/2021), 5,930 mg (12/28/2021), 6,050 mg (5/11/2022), 6,025 mg (3/9/2022), 6,025 mg (2/23/2022), 5,930 mg  (2/2/2022), 5,930 mg (1/19/2022), 6,050 mg (4/20/2022), 6,025 mg (4/6/2022), 6,025 mg (3/23/2022), 6,050 mg (6/1/2022), 6,050 mg (6/15/2022), 6,050 mg (10/19/2022), 6,050 mg (10/5/2022), 6,050 mg (11/2/2022), 6,050 mg (11/16/2022)  bevacizumab (AVASTIN) 600 mg in sodium chloride 0.9% 100 mL chemo infusion, 660 mg, Intravenous, [facility] 1 time, 27 of 28 cycles  Dose modification: 5 mg/kg (original dose 5 mg/kg, Cycle 26, Reason: MD Discretion, Comment: 5 mg/kg original dose)  Administration: 600 mg (6/30/2021), 600 mg (7/14/2021), 600 mg (7/28/2021), 600 mg (6/16/2021), 600 mg (8/11/2021), 655 mg (8/25/2021), 600 mg (9/8/2021), 600 mg (9/22/2021), 600 mg (10/6/2021), 600 mg (10/20/2021), 600 mg (11/3/2021), 655 mg (12/1/2021), 600 mg (12/15/2021), 600 mg (11/17/2021), 600 mg (12/28/2021), 600 mg (5/11/2022), 600 mg (3/9/2022), 600 mg (2/23/2022), 600 mg (2/2/2022), 600 mg (1/19/2022), 600 mg (4/20/2022), 680 mg (4/6/2022), 600 mg (3/23/2022), 680 mg (10/5/2022), 680 mg (10/19/2022), 680 mg (11/2/2022), 680 mg (11/16/2022)  irinotecan (CAMPTOSAR) 440 mg in sodium chloride 0.9% 500 mL chemo infusion, 446 mg, Intravenous, [facility] 1 time, 29 of 30 cycles  Dose modification: 180 mg/m2 (original dose 180 mg/m2, Cycle 24)  Administration: 440 mg (6/16/2021), 440 mg (6/30/2021), 440 mg (7/14/2021), 440 mg (7/28/2021), 440 mg (8/11/2021), 444 mg (8/25/2021), 440 mg (9/8/2021), 440 mg (9/22/2021), 440 mg (10/6/2021), 440 mg (10/20/2021), 440 mg (11/3/2021), 440 mg (12/1/2021), 440 mg (12/15/2021), 440 mg (11/17/2021), 440 mg (12/28/2021), 440 mg (5/11/2022), 440 mg (3/9/2022), 440 mg (2/23/2022), 440 mg (2/2/2022), 440 mg (1/19/2022), 440 mg (4/20/2022), 440 mg (4/6/2022), 440 mg (3/24/2022), 440 mg (6/1/2022), 440 mg (6/15/2022), 440 mg (10/19/2022), 440 mg (10/5/2022), 440 mg (11/2/2022), 440 mg (11/16/2022)       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   4/3/2020 Initial Diagnosis    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      5/6/2020 - 11/17/2020 Chemotherapy    Treatment Summary   Plan Name: OP FOLFOX 6 Q2W  Treatment Goal: Curative  Status: Inactive  Start Date: 5/6/2020  End Date: 11/6/2020  Provider: Dylan Leyva MD  Chemotherapy: fluorouraciL injection 945 mg, 400 mg/m2 = 945 mg, Intravenous, Clinic/[facility] 1 time, 14 of 14 cycles  Administration: 945 mg (5/6/2020), 945 mg (5/20/2020), 945 mg (6/3/2020), 945 mg (6/17/2020), 945 mg (7/29/2020), 945 mg (8/12/2020), 945 mg (8/26/2020), 945 mg (9/9/2020), 945 mg (9/23/2020), 945 mg (10/6/2020), 945 mg (10/21/2020), 945 mg (11/4/2020)  fluorouraciL 2,400 mg/m2 = 5,665 mg in sodium chloride 0.9% 240 mL chemo infusion, 2,400 mg/m2 = 5,665 mg, Intravenous, Over 46 hours, 14 of 14 cycles  Administration: 5,665 mg (5/6/2020), 5,665 mg (5/20/2020), 5,665 mg (6/3/2020), 5,665 mg (6/17/2020), 5,665 mg (7/29/2020), 5,665 mg (8/12/2020), 5,665 mg (8/26/2020), 5,665 mg (9/9/2020), 5,665 mg (9/23/2020), 5,665 mg (10/6/2020), 5,665 mg (10/21/2020), 5,665 mg (11/4/2020)  leucovorin calcium 900 mg in dextrose 5 % 250 mL infusion, 945 mg, Intravenous, Clinic/[facility] 1 time, 14 of 14 cycles  Administration: 900 mg (5/6/2020), 900 mg (5/20/2020), 900 mg (6/3/2020), 900 mg (6/17/2020), 945 mg (6/30/2020), 945 mg (7/20/2020), 945 mg (7/29/2020), 945 mg (8/12/2020), 945 mg (8/26/2020), 945 mg (9/9/2020), 945 mg (9/23/2020), 945 mg (10/6/2020), 945 mg (10/21/2020), 945 mg (11/4/2020)  oxaliplatin (ELOXATIN) 200 mg in dextrose 5 % 500 mL chemo infusion, 201 mg, Intravenous, Clinic/[facility] 1 time, 6 of 6 cycles  Dose modification: 65 mg/m2 (original dose 85 mg/m2, Cycle 6)  Administration: 200 mg (5/6/2020), 200 mg (5/20/2020), 200 mg (6/3/2020), 200 mg (6/17/2020), 201 mg (6/30/2020), 150 mg (7/20/2020)       6/16/2021 -  Chemotherapy    Treatment Summary   Plan Name: OP COLORECTAL FOLFIRI + BEVACIZUMAB Q2W  Treatment Goal: Control  Status: Active  Start Date: 6/16/2021  End Date: 12/2/2022 (Planned)  Provider:  Marah Santo MD  Chemotherapy: fluorouraciL injection 990 mg, 400 mg/m2 = 990 mg, Intravenous, [facility] 1 time, 15 of 15 cycles  Administration: 990 mg (6/16/2021), 990 mg (6/30/2021), 990 mg (7/14/2021), 980 mg (7/28/2021), 990 mg (8/11/2021), 990 mg (8/25/2021), 990 mg (9/8/2021), 990 mg (9/22/2021), 990 mg (10/6/2021), 990 mg (10/20/2021), 990 mg (11/3/2021), 990 mg (12/1/2021), 990 mg (12/15/2021), 990 mg (11/17/2021), 990 mg (12/28/2021)  fluorouraciL 2,400 mg/m2 = 5,950 mg in sodium chloride 0.9% 240 mL chemo infusion, 2,400 mg/m2 = 5,950 mg, Intravenous, Over 46 hours, 29 of 30 cycles  Administration: 5,950 mg (6/16/2021), 5,950 mg (6/30/2021), 5,950 mg (7/14/2021), 5,880 mg (7/28/2021), 5,930 mg (8/11/2021), 5,930 mg (8/25/2021), 5,930 mg (9/8/2021), 5,930 mg (9/22/2021), 5,930 mg (10/6/2021), 5,930 mg (10/20/2021), 5,930 mg (11/3/2021), 5,930 mg (12/1/2021), 5,930 mg (12/15/2021), 5,930 mg (11/17/2021), 5,930 mg (12/28/2021), 6,050 mg (5/11/2022), 6,025 mg (3/9/2022), 6,025 mg (2/23/2022), 5,930 mg (2/2/2022), 5,930 mg (1/19/2022), 6,050 mg (4/20/2022), 6,025 mg (4/6/2022), 6,025 mg (3/23/2022), 6,050 mg (6/1/2022), 6,050 mg (6/15/2022), 6,050 mg (10/19/2022), 6,050 mg (10/5/2022), 6,050 mg (11/2/2022), 6,050 mg (11/16/2022)  bevacizumab (AVASTIN) 600 mg in sodium chloride 0.9% 100 mL chemo infusion, 660 mg, Intravenous, [facility]\"" 1 time, 27 of 28 cycles  Dose modification: 5 mg/kg (original dose 5 mg/kg, Cycle 26, Reason: MD Discretion, Comment: 5 mg/kg original dose)  Administration: 600 mg (6/30/2021), 600 mg (7/14/2021), 600 mg (7/28/2021), 600 mg (6/16/2021), 600 mg (8/11/2021), 655 mg (8/25/2021), 600 mg (9/8/2021), 600 mg (9/22/2021), 600 mg (10/6/2021), 600 mg (10/20/2021), 600 mg (11/3/2021), 655 mg (12/1/2021), 600 mg (12/15/2021), 600 mg (11/17/2021), 600 mg (12/28/2021), 600 mg (5/11/2022), 600 mg (3/9/2022), 600 mg (2/23/2022), 600 mg (2/2/2022), 600 mg (1/19/2022), 600 mg (4/20/2022),  680 mg (4/6/2022), 600 mg (3/23/2022), 680 mg (10/5/2022), 680 mg (10/19/2022), 680 mg (11/2/2022), 680 mg (11/16/2022)  irinotecan (CAMPTOSAR) 440 mg in sodium chloride 0.9% 500 mL chemo infusion, 446 mg, Intravenous, Clinic/[facility] 1 time, 29 of 30 cycles  Dose modification: 180 mg/m2 (original dose 180 mg/m2, Cycle 24)  Administration: 440 mg (6/16/2021), 440 mg (6/30/2021), 440 mg (7/14/2021), 440 mg (7/28/2021), 440 mg (8/11/2021), 444 mg (8/25/2021), 440 mg (9/8/2021), 440 mg (9/22/2021), 440 mg (10/6/2021), 440 mg (10/20/2021), 440 mg (11/3/2021), 440 mg (12/1/2021), 440 mg (12/15/2021), 440 mg (11/17/2021), 440 mg (12/28/2021), 440 mg (5/11/2022), 440 mg (3/9/2022), 440 mg (2/23/2022), 440 mg (2/2/2022), 440 mg (1/19/2022), 440 mg (4/20/2022), 440 mg (4/6/2022), 440 mg (3/24/2022), 440 mg (6/1/2022), 440 mg (6/15/2022), 440 mg (10/19/2022), 440 mg (10/5/2022), 440 mg (11/2/2022), 440 mg (11/16/2022)           Objective:  Gail Mckinnon arrived early for the session. Ms. Mckinnon was independently ambulatory at the time of session. The patient was fully cooperative throughout the session w/ the notable absence of tears.  Appearance: age appropriate, casually  dressed, well groomed  Behavior/Cooperation: friendly and cooperative  Speech: appropriate quality, quantity and organization of sentences and pressured  Mood: anxious  Affect: anxious and mood congruent  Thought Process: goal-directed, logical  Thought Content: normal,  No delusions or paranoia; did not appear to be responding to internal stimuli during the session  Orientation: grossly intact  Memory: grossly intact  Attention Span/Concentration: Attends to session without distraction; reports no difficulty  Fund of Knowledge: average  Estimate of Intelligence: average from verbal skills and history  Cognition: grossly intact  Insight: patient has awareness of illness; good insight into own behavior and behavior of others  Judgment: the patient's  behavior is adequate to circumstances    NCCN Distress thermometer:   DISTRESS SCREENING 11/28/2022 11/13/2022 10/31/2022 10/31/2022 10/14/2022 9/30/2022 7/26/2022   Distress Score 8 6 7 7 7 8 4   Practical Problems Housing;Insurance/Financial;Treatment Decisions Insurance/Financial;Work/School;Treatment Decisions Work/School;Insurance/Financial Insurance/Financial;Work/School Insurance/Financial;Work/School;Treatment Decisions Insurance/Financial;Treatment Decisions Insurance/Financial   Family Problems Family Health Issues Family Health Issues None of these None of these None of these None of these None of these   Emotional Problems Depression;Fears;Sadness;Worry Depression;Sadness;Worry Worry;Depression Depression;Worry Depression;Fears;Worry Depression;Sadness;Worry Depression;Worry   Spiritual / Mosque Concerns No No No No No No No   Physical Problems Constipation;Fatigue;Feeling Swollen;Getting Around;Memory/Concentration;Mouth Sores;Nausea;Pain;Skin Dry/Itchy;Sleep;Tingling in hands or feet Appearance;Constipation;Fatigue;Getting Around;Memory/Concentration;Mouth Sores;Pain Appearance;Sleep;Mouth Sores;Fatigue Appearance;Fatigue;Feeling Swollen;Mouth Sores;Nausea Fatigue;Sleep Fatigue None of these   Other Problems - - - - - - -   Some encounter information is confidential and restricted. Go to Review Flowsheets activity to see all data.        Interval history and content of current session: Discussed adaptation to tx, follow up w/ [facility] regarding clinical trials, and recent financial stressors (LTD qualification). Reports to be coping with moderate difficulty, noting that her ability to contribute to/manage/control her situation continues to be at the forefront of her distress (though application of meal ending behaviors was effective in practice). Thoughts of this type are in evidence with moderate distress. Provided cognitive behavioral therapy to address negative cognitions, discussing the control  "inherent to acceptance of personal limitations ("winging it"). Encouraged continued use of pacing behaviors w/ job search/home tasks. Identified and evaluated psychosocial and environmental stressors secondary to diagnosis and treatment.  Examined proactive behaviors that may be implemented to minimize or ameliorate psychosocial stressors secondary to diagnosis and treatment.     Risk parameters:   Patient reports no suicidal ideation  Patient reports no homicidal ideation  Patient reports no self-injurious behavior  Patient reports no violent behavior   Safety needs:  None at this time      Verbal deficits: None     Patient's response to intervention:The patient's response to intervention is accepting.     Progress toward goals and other mental status changes:  The patient's progress toward goals is fair .      Progress to date:Progress - Ongoing, but Slow      Goals from last visit: Met        Patient Strengths: verbal, motivated, intelligent, successful, good social support, good insight, commitment to wellness, strong cultural traditions        Treatment Plan:individual psychotherapy  Target symptoms: depression, anxiety, adjustment  Why chosen therapy is appropriate versus another modality: relevant to diagnosis, patient responds to this modality, evidence based practice  Outcome monitoring methods: self-report, observation, tx team feedback  Therapeutic intervention type: insight oriented psychotherapy, supportive psychotherapy  Prognosis: Good                            Behavioral goals:               Exercise: continued/increased as per physician recommendations              Stress management: appropriate boundaries and accepting aid when needed              Social engagement: continued and increased especially w/ grandchildren/family, as per CDC COVID-19 guidelines              Therapy:  adaptive coping, CBT (cognitive restructuring), management of all or nothing thinking patterns, control in knowing " limitations    Return to clinic: 3 weeks     Length of Service (minutes direct face-to-face contact): 45    Diagnosis:     ICD-10-CM ICD-9-CM   1. Moderate episode of recurrent major depressive disorder  F33.1 296.32   2. Anxiety associated with cancer diagnosis  F41.1 300.09    C80.1    3. Malignant neoplasm of sigmoid colon  C18.7 153.3                Marky Palm Psy.D.  [place] License #[id number]  [place] License #[id number]

## 2022-11-30 ENCOUNTER — INFUSION (OUTPATIENT)
Dept: INFUSION THERAPY | Facility: HOSPITAL | Age: 52
End: 2022-11-30
Attending: INTERNAL MEDICINE
Payer: MEDICAID

## 2022-11-30 ENCOUNTER — DOCUMENTATION ONLY (OUTPATIENT)
Dept: INFUSION THERAPY | Facility: HOSPITAL | Age: 52
End: 2022-11-30

## 2022-11-30 VITALS
WEIGHT: 293 LBS | DIASTOLIC BLOOD PRESSURE: 79 MMHG | HEART RATE: 84 BPM | SYSTOLIC BLOOD PRESSURE: 133 MMHG | TEMPERATURE: 98 F | RESPIRATION RATE: 16 BRPM | BODY MASS INDEX: 47.09 KG/M2 | HEIGHT: 66 IN

## 2022-11-30 DIAGNOSIS — C77.2 METASTASIS TO INTESTINAL LYMPH NODE: ICD-10-CM

## 2022-11-30 DIAGNOSIS — C18.7 MALIGNANT NEOPLASM OF SIGMOID COLON: Primary | ICD-10-CM

## 2022-11-30 PROCEDURE — 96368 THER/DIAG CONCURRENT INF: CPT | Mod: PN

## 2022-11-30 PROCEDURE — 96375 TX/PRO/DX INJ NEW DRUG ADDON: CPT | Mod: PN

## 2022-11-30 PROCEDURE — 96413 CHEMO IV INFUSION 1 HR: CPT | Mod: PN

## 2022-11-30 PROCEDURE — 63600175 PHARM REV CODE 636 W HCPCS: Mod: PN | Performed by: INTERNAL MEDICINE

## 2022-11-30 PROCEDURE — 81003 URINALYSIS AUTO W/O SCOPE: CPT | Mod: PN | Performed by: INTERNAL MEDICINE

## 2022-11-30 PROCEDURE — 25000003 PHARM REV CODE 250: Mod: PN | Performed by: INTERNAL MEDICINE

## 2022-11-30 PROCEDURE — 96367 TX/PROPH/DG ADDL SEQ IV INF: CPT | Mod: PN

## 2022-11-30 PROCEDURE — 96417 CHEMO IV INFUS EACH ADDL SEQ: CPT | Mod: PN

## 2022-11-30 PROCEDURE — 36593 DECLOT VASCULAR DEVICE: CPT | Mod: PN

## 2022-11-30 PROCEDURE — 96416 CHEMO PROLONG INFUSE W/PUMP: CPT | Mod: PN

## 2022-11-30 RX ORDER — WATER FOR INJECTION,STERILE
VIAL (ML) INJECTION
Status: DISCONTINUED
Start: 2022-11-30 | End: 2022-11-30 | Stop reason: HOSPADM

## 2022-11-30 RX ORDER — ATROPINE SULFATE 0.4 MG/ML
0.4 INJECTION, SOLUTION ENDOTRACHEAL; INTRAMEDULLARY; INTRAMUSCULAR; INTRAVENOUS; SUBCUTANEOUS ONCE AS NEEDED
Status: DISCONTINUED | OUTPATIENT
Start: 2022-11-30 | End: 2022-11-30 | Stop reason: HOSPADM

## 2022-11-30 RX ORDER — LORAZEPAM 2 MG/ML
1 INJECTION INTRAMUSCULAR
Status: COMPLETED | OUTPATIENT
Start: 2022-11-30 | End: 2022-11-30

## 2022-11-30 RX ADMIN — LEUCOVORIN CALCIUM 1010 MG: 350 INJECTION, POWDER, LYOPHILIZED, FOR SOLUTION INTRAMUSCULAR; INTRAVENOUS at 02:11

## 2022-11-30 RX ADMIN — PROMETHAZINE HYDROCHLORIDE 6.25 MG: 25 INJECTION INTRAMUSCULAR; INTRAVENOUS at 12:11

## 2022-11-30 RX ADMIN — SODIUM CHLORIDE: 0.9 INJECTION, SOLUTION INTRAVENOUS at 12:11

## 2022-11-30 RX ADMIN — PALONOSETRON HYDROCHLORIDE: 0.25 INJECTION, SOLUTION INTRAVENOUS at 01:11

## 2022-11-30 RX ADMIN — ALTEPLASE 2 MG: 2.2 INJECTION, POWDER, LYOPHILIZED, FOR SOLUTION INTRAVENOUS at 11:11

## 2022-11-30 RX ADMIN — BEVACIZUMAB 680 MG: 400 INJECTION, SOLUTION INTRAVENOUS at 01:11

## 2022-11-30 RX ADMIN — FLUOROURACIL 6050 MG: 50 INJECTION, SOLUTION INTRAVENOUS at 03:11

## 2022-11-30 RX ADMIN — LORAZEPAM 1 MG: 2 INJECTION INTRAMUSCULAR; INTRAVENOUS at 12:11

## 2022-11-30 RX ADMIN — SODIUM CHLORIDE 440 MG: 0.9 INJECTION, SOLUTION INTRAVENOUS at 02:11

## 2022-11-30 NOTE — PLAN OF CARE
Pt tolerated avastin/folfiri regimen well.  No s/s of infusion reaction noted.  Pt connected to portable chemo pump.  To RTC on Friday to be disconnected from pump.  Instructed to call MD with any problems

## 2022-11-30 NOTE — PROGRESS NOTES
Late entry for 11/29/22    JULIANNE has received conformation that request sent to Steffany Mendez for [employer] Patient and Family Fund Assistance was received and approved. The check will be cut on 11/30/22 for $1000 to the pt's landlord Juan [employer] Bernardo for December's rent.   This will exhaust the pt's [employer] pt assistance funds for this pt.    SW spoke with pt and informed her the funds were approved to help with Decembers rent. Pt was thankful.  Pt reported to JULIANNE she has been approved for her Soc. Sec disability. She was surprised she was approved on her first attempt.  She was approved for $1312 and was told it will be 5 months before the benefit starts. She said she was told she can work part time and make $1300 or less and keep the disability income. She is worried about being able to live on this decreased income. She is uncertain as to what she will do. She also said [facility] does not accept Medicaid. She is working with them on getting a single case agreement for her care there. Pt reports in dealing with these issues she has been very emotional. She reports crying daily. She reports has been tearful for the last several days. SW validated pt's feelings, provided emotional support. Pt is seeing Dr Palm later today. SW encouraged her to process her thoughts and feelings regarding her situation in therapy with Dr Palm. She agreed. JULIANNE to follow.     Radha Giordano, MEDINA

## 2022-11-30 NOTE — PROGRESS NOTES
Oncology Nutrition   Chemotherapy Infusion Visit    Nutrition Follow Up   RD met with patient at chairside during infusion treatment. Pt reports continues to do well nutritionally- eating without difficulty, maintaining weight, and denies nutrition related side effects.    Pt eligible for TFP- order filled and will be provided to patient at the end of infusion tx today.    Wt Readings from Last 10 Encounters:   11/30/22 133.8 kg (294 lb 15.6 oz)   11/29/22 133.8 kg (294 lb 15.6 oz)   11/18/22 134 kg (295 lb 6.7 oz)   11/16/22 134 kg (295 lb 6.7 oz)   11/14/22 133.7 kg (294 lb 12.1 oz)   11/04/22 133 kg (293 lb 3.4 oz)   11/02/22 133 kg (293 lb 3.4 oz)   10/31/22 133 kg (293 lb 3.4 oz)   10/19/22 133.3 kg (293 lb 14 oz)   10/17/22 133.3 kg (293 lb 14 oz)       All other nutrition questions/concerns addressed as appropriate. Will continue to follow and monitor throughout treatment PRN.     Raquel Bullock, MS, RD, LDN  11/30/2022  3:30 PM

## 2022-12-01 ENCOUNTER — PATIENT MESSAGE (OUTPATIENT)
Dept: HEMATOLOGY/ONCOLOGY | Facility: CLINIC | Age: 52
End: 2022-12-01
Payer: MEDICAID

## 2022-12-01 RX ORDER — SODIUM CHLORIDE 0.9 % (FLUSH) 0.9 %
10 SYRINGE (ML) INJECTION
Status: CANCELLED | OUTPATIENT
Start: 2022-12-02

## 2022-12-01 RX ORDER — HEPARIN 100 UNIT/ML
500 SYRINGE INTRAVENOUS
Status: CANCELLED | OUTPATIENT
Start: 2022-12-02

## 2022-12-02 ENCOUNTER — INFUSION (OUTPATIENT)
Dept: INFUSION THERAPY | Facility: HOSPITAL | Age: 52
End: 2022-12-02
Attending: INTERNAL MEDICINE
Payer: MEDICAID

## 2022-12-02 VITALS
SYSTOLIC BLOOD PRESSURE: 126 MMHG | RESPIRATION RATE: 16 BRPM | OXYGEN SATURATION: 98 % | WEIGHT: 293 LBS | HEART RATE: 71 BPM | HEIGHT: 66 IN | TEMPERATURE: 98 F | BODY MASS INDEX: 47.09 KG/M2 | DIASTOLIC BLOOD PRESSURE: 83 MMHG

## 2022-12-02 DIAGNOSIS — C77.2 METASTASIS TO INTESTINAL LYMPH NODE: Primary | ICD-10-CM

## 2022-12-02 DIAGNOSIS — C18.7 MALIGNANT NEOPLASM OF SIGMOID COLON: ICD-10-CM

## 2022-12-02 PROCEDURE — 25000003 PHARM REV CODE 250: Mod: PN | Performed by: INTERNAL MEDICINE

## 2022-12-02 PROCEDURE — 96361 HYDRATE IV INFUSION ADD-ON: CPT | Mod: PN

## 2022-12-02 PROCEDURE — 96360 HYDRATION IV INFUSION INIT: CPT | Mod: PN

## 2022-12-02 RX ORDER — HEPARIN 100 UNIT/ML
500 SYRINGE INTRAVENOUS
Status: DISCONTINUED | OUTPATIENT
Start: 2022-12-02 | End: 2022-12-02 | Stop reason: HOSPADM

## 2022-12-02 RX ORDER — SODIUM CHLORIDE 9 MG/ML
1000 INJECTION, SOLUTION INTRAVENOUS
Status: DISCONTINUED | OUTPATIENT
Start: 2022-12-02 | End: 2022-12-02 | Stop reason: HOSPADM

## 2022-12-02 RX ORDER — SODIUM CHLORIDE 0.9 % (FLUSH) 0.9 %
10 SYRINGE (ML) INJECTION
Status: DISCONTINUED | OUTPATIENT
Start: 2022-12-02 | End: 2022-12-02 | Stop reason: HOSPADM

## 2022-12-02 RX ADMIN — SODIUM CHLORIDE 1000 ML: 0.9 INJECTION, SOLUTION INTRAVENOUS at 02:12

## 2022-12-02 NOTE — PLAN OF CARE
Problem: Adult Inpatient Plan of Care  Goal: Plan of Care Review  Outcome: Ongoing, Progressing  Flowsheets (Taken 12/2/2022 1704)  Plan of Care Reviewed With: patient  Goal: Patient-Specific Goal (Individualized)  Outcome: Ongoing, Progressing  Flowsheets (Taken 12/2/2022 1704)  Anxieties, Fears or Concerns: waiting on single case agreement approval for trial at [facility]  Individualized Care Needs: recliner, education, conversation, snacks, tv, window seat  Patient-Specific Goals (Include Timeframe): infection prevention maintained during treatment     Problem: Fatigue  Goal: Improved Activity Tolerance  Outcome: Ongoing, Progressing  Intervention: Promote Improved Energy  Flowsheets (Taken 12/2/2022 1704)  Fatigue Management:   frequent rest breaks encouraged   paced activity encouraged   activity assistance provided   fatigue-related activity identified  Sleep/Rest Enhancement:   relaxation techniques promoted   therapeutic touch utilized   family presence promoted   noise level reduced   natural light exposure provided   consistent schedule promoted   awakenings minimized  Activity Management: Ambulated -L4  Pt arrived to clinic for 5fu pump d/c and IVFs and tolerated well with no changes throughout therapy other than feeling a little stronger and not so weak. Pt encouraged to drink plenty of clear fluids at home and to urinate often. Pt aware of number to call for any concerns and f/u appts and discharged to home in NAD.

## 2022-12-05 ENCOUNTER — TELEPHONE (OUTPATIENT)
Dept: HEMATOLOGY/ONCOLOGY | Facility: CLINIC | Age: 52
End: 2022-12-05
Payer: MEDICAID

## 2022-12-08 ENCOUNTER — HOSPITAL ENCOUNTER (OUTPATIENT)
Dept: RADIOLOGY | Facility: HOSPITAL | Age: 52
Discharge: HOME | End: 2022-12-08
Attending: INTERNAL MEDICINE
Payer: MEDICAID

## 2022-12-08 DIAGNOSIS — C18.7 MALIGNANT NEOPLASM OF SIGMOID COLON: ICD-10-CM

## 2022-12-08 LAB — GLUCOSE SERPL-MCNC: 98 MG/DL (ref 70–110)

## 2022-12-08 PROCEDURE — 78815 NM PET CT ROUTINE: ICD-10-PCS | Mod: 26,PS,, | Performed by: RADIOLOGY

## 2022-12-08 PROCEDURE — 78815 PET IMAGE W/CT SKULL-THIGH: CPT | Mod: 26,PS,, | Performed by: RADIOLOGY

## 2022-12-08 PROCEDURE — A9552 F18 FDG: HCPCS | Mod: PN

## 2022-12-08 PROCEDURE — 78815 PET IMAGE W/CT SKULL-THIGH: CPT | Mod: TC,PN

## 2022-12-08 NOTE — PROGRESS NOTES
PET Imaging Questionnaire    Are you a Diabetic? Recent Blood Sugar level? No    Are you anemic? Bone Marrow Stimulation Meds? No    Have you had a CT Scan, if so when & where was your last one? Yes -     Have you had a PET Scan, if so when & where was your last one? Yes -     Chemotherapy or currently on Chemotherapy? Yes    Radiation therapy? No    Surgical History:   Past Surgical History:   Procedure Laterality Date     SECTION, CLASSIC      x3    COLONOSCOPY N/A 2020    Procedure: COLONOSCOPY;  Surgeon: Shane Parker MD;  Location: [facility] ENDO;  Service: Endoscopy;  Laterality: N/A;    COLONOSCOPY N/A 2022    Procedure: COLONOSCOPY;  Surgeon: Shane Parker MD;  Location: [facility] ENDO;  Service: Endoscopy;  Laterality: N/A;    CYSTOSCOPY W/ URETERAL STENT PLACEMENT Bilateral 3/25/2020    Procedure: CYSTOSCOPY, WITH URETERAL STENT INSERTION;  Surgeon: Claudio Tyson MD;  Location: [facility] OR [address] [place] [address];  Service: Urology;  Laterality: Bilateral;    INSERTION OF TUNNELED CENTRAL VENOUS CATHETER (CVC) WITH SUBCUTANEOUS PORT N/A 2020    Procedure: [id number];  Surgeon: [facility];  Location: [facility] OR [address] [place] [address];  Service: Radiology;  Laterality: N/A;  Room 189/[facility]    LAPAROSCOPIC SIGMOIDECTOMY N/A 3/25/2020    Procedure: COLECTOMY, SIGMOID, LAPAROSCOPIC, flex sig, ERAS high;  Surgeon: Silvio Man MD;  Location: [facility] OR [address] [place] [address];  Service: Colon and Rectal;  Laterality: N/A;    MOBILIZATION OF SPLENIC FLEXURE  3/25/2020    Procedure: MOBILIZATION, SPLENIC FLEXURE;  Surgeon: Silvio Man MD;  Location: [facility] OR [address] [place] [address];  Service: Colon and Rectal;;    tonsillectomy      TOTAL ABDOMINAL HYSTERECTOMY W/ BILATERAL SALPINGOOPHORECTOMY N/A 3/25/2020    Procedure: HYSTERECTOMY, TOTAL, ABDOMINAL, WITH BILATERAL SALPINGO-OOPHORECTOMY;  Surgeon: Keron Brady MD;  Location: [facility] OR [address] [place] [address];  Service: Oncology;  Laterality: N/A;        Have you been fasting  for at least 6 hours? Yes    Is there any chance you may be pregnant or breastfeeding? No    Assay: 12.38 MCi@:11:27   Injection Site:RT AC    Residual: .492 mCi@: 11:29   Technologist: Flaquito Box Injected:11.89mCi

## 2022-12-09 NOTE — PROGRESS NOTES
This PET needs to be compared to the most recent one at [facility]. Please let the radiologist know and assist in obtaining the discs if needed. Thanks

## 2022-12-12 ENCOUNTER — OFFICE VISIT (OUTPATIENT)
Dept: HEMATOLOGY/ONCOLOGY | Facility: CLINIC | Age: 52
End: 2022-12-12
Payer: MEDICAID

## 2022-12-12 ENCOUNTER — LAB VISIT (OUTPATIENT)
Dept: LAB | Facility: HOSPITAL | Age: 52
End: 2022-12-12
Attending: INTERNAL MEDICINE
Payer: MEDICAID

## 2022-12-12 ENCOUNTER — OFFICE VISIT (OUTPATIENT)
Dept: PSYCHIATRY | Facility: CLINIC | Age: 52
End: 2022-12-12
Payer: MEDICAID

## 2022-12-12 VITALS
HEART RATE: 102 BPM | OXYGEN SATURATION: 98 % | BODY MASS INDEX: 47.09 KG/M2 | HEIGHT: 66 IN | RESPIRATION RATE: 18 BRPM | TEMPERATURE: 97 F | WEIGHT: 293 LBS | DIASTOLIC BLOOD PRESSURE: 72 MMHG | SYSTOLIC BLOOD PRESSURE: 102 MMHG

## 2022-12-12 DIAGNOSIS — F41.1 ANXIETY ASSOCIATED WITH CANCER DIAGNOSIS: ICD-10-CM

## 2022-12-12 DIAGNOSIS — C18.7 MALIGNANT NEOPLASM OF SIGMOID COLON: ICD-10-CM

## 2022-12-12 DIAGNOSIS — F33.1 MODERATE EPISODE OF RECURRENT MAJOR DEPRESSIVE DISORDER: Primary | ICD-10-CM

## 2022-12-12 DIAGNOSIS — C80.1 ANXIETY ASSOCIATED WITH CANCER DIAGNOSIS: ICD-10-CM

## 2022-12-12 DIAGNOSIS — C18.7 MALIGNANT NEOPLASM OF SIGMOID COLON: Primary | ICD-10-CM

## 2022-12-12 DIAGNOSIS — C77.2 METASTASIS TO INTESTINAL LYMPH NODE: ICD-10-CM

## 2022-12-12 DIAGNOSIS — C77.9 SECONDARY ADENOCARCINOMA OF LYMPH NODE: ICD-10-CM

## 2022-12-12 LAB
ALBUMIN SERPL BCP-MCNC: 3.7 G/DL (ref 3.5–5.2)
ALP SERPL-CCNC: 183 U/L (ref 55–135)
ALT SERPL W/O P-5'-P-CCNC: 55 U/L (ref 10–44)
ANION GAP SERPL CALC-SCNC: 9 MMOL/L (ref 8–16)
AST SERPL-CCNC: 37 U/L (ref 10–40)
BACTERIA #/AREA URNS HPF: ABNORMAL /HPF
BASOPHILS # BLD AUTO: 0.02 K/UL (ref 0–0.2)
BASOPHILS NFR BLD: 0.4 % (ref 0–1.9)
BILIRUB SERPL-MCNC: 0.3 MG/DL (ref 0.1–1)
BILIRUB UR QL STRIP: NEGATIVE
BUN SERPL-MCNC: 15 MG/DL (ref 6–20)
CALCIUM SERPL-MCNC: 11 MG/DL (ref 8.7–10.5)
CHLORIDE SERPL-SCNC: 110 MMOL/L (ref 95–110)
CLARITY UR: CLEAR
CO2 SERPL-SCNC: 21 MMOL/L (ref 23–29)
COLOR UR: YELLOW
CREAT SERPL-MCNC: 1 MG/DL (ref 0.5–1.4)
DIFFERENTIAL METHOD: ABNORMAL
EOSINOPHIL # BLD AUTO: 0.3 K/UL (ref 0–0.5)
EOSINOPHIL NFR BLD: 5.3 % (ref 0–8)
ERYTHROCYTE [DISTWIDTH] IN BLOOD BY AUTOMATED COUNT: 18.8 % (ref 11.5–14.5)
EST. GFR  (NO RACE VARIABLE): >60 ML/MIN/1.73 M^2
GLUCOSE SERPL-MCNC: 95 MG/DL (ref 70–110)
GLUCOSE UR QL STRIP: NEGATIVE
HCT VFR BLD AUTO: 45.3 % (ref 37–48.5)
HGB BLD-MCNC: 14.7 G/DL (ref 12–16)
HGB UR QL STRIP: ABNORMAL
IMM GRANULOCYTES # BLD AUTO: 0.03 K/UL (ref 0–0.04)
IMM GRANULOCYTES NFR BLD AUTO: 0.5 % (ref 0–0.5)
KETONES UR QL STRIP: NEGATIVE
LEUKOCYTE ESTERASE UR QL STRIP: NEGATIVE
LYMPHOCYTES # BLD AUTO: 1.7 K/UL (ref 1–4.8)
LYMPHOCYTES NFR BLD: 29.9 % (ref 18–48)
MCH RBC QN AUTO: 28.5 PG (ref 27–31)
MCHC RBC AUTO-ENTMCNC: 32.5 G/DL (ref 32–36)
MCV RBC AUTO: 88 FL (ref 82–98)
MICROSCOPIC COMMENT: ABNORMAL
MONOCYTES # BLD AUTO: 0.7 K/UL (ref 0.3–1)
MONOCYTES NFR BLD: 12.1 % (ref 4–15)
NEUTROPHILS # BLD AUTO: 2.9 K/UL (ref 1.8–7.7)
NEUTROPHILS NFR BLD: 51.8 % (ref 38–73)
NITRITE UR QL STRIP: NEGATIVE
NRBC BLD-RTO: 0 /100 WBC
PH UR STRIP: 6 [PH] (ref 5–8)
PLATELET # BLD AUTO: 275 K/UL (ref 150–450)
PMV BLD AUTO: 9.8 FL (ref 9.2–12.9)
POTASSIUM SERPL-SCNC: 4.3 MMOL/L (ref 3.5–5.1)
PROT SERPL-MCNC: 7.2 G/DL (ref 6–8.4)
PROT UR QL STRIP: NEGATIVE
RBC # BLD AUTO: 5.16 M/UL (ref 4–5.4)
RBC #/AREA URNS HPF: 5 /HPF (ref 0–4)
SODIUM SERPL-SCNC: 140 MMOL/L (ref 136–145)
SP GR UR STRIP: >=1.03 (ref 1–1.03)
SQUAMOUS #/AREA URNS HPF: 2 /HPF
URN SPEC COLLECT METH UR: ABNORMAL
WBC # BLD AUTO: 5.68 K/UL (ref 3.9–12.7)
WBC #/AREA URNS HPF: 1 /HPF (ref 0–5)

## 2022-12-12 PROCEDURE — 80053 COMPREHEN METABOLIC PANEL: CPT | Mod: PN | Performed by: INTERNAL MEDICINE

## 2022-12-12 PROCEDURE — 99215 OFFICE O/P EST HI 40 MIN: CPT | Mod: PBBFAC,PN | Performed by: INTERNAL MEDICINE

## 2022-12-12 PROCEDURE — 4010F ACE/ARB THERAPY RXD/TAKEN: CPT | Mod: CPTII,,, | Performed by: INTERNAL MEDICINE

## 2022-12-12 PROCEDURE — 4010F PR ACE/ARB THEARPY RXD/TAKEN: ICD-10-PCS | Mod: CPTII,,, | Performed by: INTERNAL MEDICINE

## 2022-12-12 PROCEDURE — 36415 COLL VENOUS BLD VENIPUNCTURE: CPT | Mod: PN | Performed by: INTERNAL MEDICINE

## 2022-12-12 PROCEDURE — 99999 PR PBB SHADOW E&M-EST. PATIENT-LVL V: CPT | Mod: PBBFAC,,, | Performed by: INTERNAL MEDICINE

## 2022-12-12 PROCEDURE — 90834 PR PSYCHOTHERAPY W/PATIENT, 45 MIN: ICD-10-PCS | Mod: AH,HB,, | Performed by: PSYCHOLOGIST

## 2022-12-12 PROCEDURE — 99215 PR OFFICE/OUTPT VISIT, EST, LEVL V, 40-54 MIN: ICD-10-PCS | Mod: S$PBB,,, | Performed by: INTERNAL MEDICINE

## 2022-12-12 PROCEDURE — 99215 OFFICE O/P EST HI 40 MIN: CPT | Mod: S$PBB,,, | Performed by: INTERNAL MEDICINE

## 2022-12-12 PROCEDURE — 1159F PR MEDICATION LIST DOCUMENTED IN MEDICAL RECORD: ICD-10-PCS | Mod: CPTII,,, | Performed by: INTERNAL MEDICINE

## 2022-12-12 PROCEDURE — 3078F DIAST BP <80 MM HG: CPT | Mod: CPTII,,, | Performed by: INTERNAL MEDICINE

## 2022-12-12 PROCEDURE — 4010F ACE/ARB THERAPY RXD/TAKEN: CPT | Mod: AH,HB,CPTII, | Performed by: PSYCHOLOGIST

## 2022-12-12 PROCEDURE — 99999 PR PBB SHADOW E&M-EST. PATIENT-LVL V: ICD-10-PCS | Mod: PBBFAC,,, | Performed by: INTERNAL MEDICINE

## 2022-12-12 PROCEDURE — 3008F BODY MASS INDEX DOCD: CPT | Mod: CPTII,,, | Performed by: INTERNAL MEDICINE

## 2022-12-12 PROCEDURE — 1159F MED LIST DOCD IN RCRD: CPT | Mod: CPTII,,, | Performed by: INTERNAL MEDICINE

## 2022-12-12 PROCEDURE — 3078F PR MOST RECENT DIASTOLIC BLOOD PRESSURE < 80 MM HG: ICD-10-PCS | Mod: CPTII,,, | Performed by: INTERNAL MEDICINE

## 2022-12-12 PROCEDURE — 3074F SYST BP LT 130 MM HG: CPT | Mod: CPTII,,, | Performed by: INTERNAL MEDICINE

## 2022-12-12 PROCEDURE — 90834 PSYTX W PT 45 MINUTES: CPT | Mod: AH,HB,, | Performed by: PSYCHOLOGIST

## 2022-12-12 PROCEDURE — 81000 URINALYSIS NONAUTO W/SCOPE: CPT | Mod: PN | Performed by: INTERNAL MEDICINE

## 2022-12-12 PROCEDURE — 85025 COMPLETE CBC W/AUTO DIFF WBC: CPT | Mod: PN | Performed by: INTERNAL MEDICINE

## 2022-12-12 PROCEDURE — 3008F PR BODY MASS INDEX (BMI) DOCUMENTED: ICD-10-PCS | Mod: CPTII,,, | Performed by: INTERNAL MEDICINE

## 2022-12-12 PROCEDURE — 4010F PR ACE/ARB THEARPY RXD/TAKEN: ICD-10-PCS | Mod: AH,HB,CPTII, | Performed by: PSYCHOLOGIST

## 2022-12-12 PROCEDURE — 3074F PR MOST RECENT SYSTOLIC BLOOD PRESSURE < 130 MM HG: ICD-10-PCS | Mod: CPTII,,, | Performed by: INTERNAL MEDICINE

## 2022-12-12 NOTE — PROGRESS NOTES
PROGRESS NOTE    Subjective:       Patient ID: Gail Mckinnon is a 52 y.o. female.  MRN: [id number]  : 1970    Chief Complaint: Malignant neoplasm of sigmoid colon        History of Present Illness:   Gail Mckinnon is a 52 y.o. female who presents with colon cancer, initially stage III and now with LN recurrence.       She completed adjuvant FOLFOX in 2020. She tolerated only 4 cycles of FOLFOX before she developed an infusion reaction to oxaliplatin in cycle 5 was well as cycle 6. She then completed 6 cycles of infusional 5 FU.     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 She was offered second line therapy with FOLFIRI and Avastin.      She presented to the ED on  with left flank pain. CT renal stone study showed Moderate hydronephrosis on the left secondary to 3 mm calculus at the [facility].    She had a PET scan mid April that showed possible progression of her disease with      She has been to [facility] for another opinion. No change was recommended in systemic therapy but she was offered surgical removal of the aorta caval nodes.  Recent sans at [facility]  show stable disease.      Surgery done 22. Received 2 more cycle of FOLFIRI without Avastin.     She had repeat imaging at [facility] on 22 that unfortunately showed disease progression since her surgery with multiple RP nodes and one mediastinal node.      Interim history:    She presents for a follow up visit. See prior notes for discussion. She has resumed FOLFIRI and Avastin and has had an 8 week follow up PET scan.   It does show disease progression but has not compared to the most recent scans done at [facility].   She has not been able to return to [facility] due to change in insurance but has a virtual visit with Dr. Montelongo tomorrow to discuss.     She reports persistent anxiety, is following up with Dr. Palm.     PET 22  Impression:     1. Progression of  disease with interval increase of abdominal and pelvic lymphadenopathy.  2. New area of moderate FDG uptake at the right half of the T9 vertebral body (image 124).  Favor degenerative disc changes.  No underlying osteolytic or osteoblastic lesion.  Recommend continued attention on follow-up.  3. No other evidence of FDG avid disease.           Oncology History:  Oncology History   Malignant neoplasm of sigmoid colon   3/16/2020 Initial Diagnosis    Malignant neoplasm of sigmoid colon     3/31/2020 Cancer Staged    Staging form: Colon and Rectum, AJCC 8th Edition  - Clinical stage from 3/31/2020: Stage IIIC (cT4b, cN2a, cM0)       5/6/2020 - 11/17/2020 Chemotherapy    Treatment Summary   Plan Name: OP FOLFOX 6 Q2W  Treatment Goal: Curative  Status: Inactive  Start Date: 5/6/2020  End Date: 11/6/2020  Provider: Dylan Leyva MD  Chemotherapy: fluorouraciL injection 945 mg, 400 mg/m2 = 945 mg, Intravenous, Clinic/[facility] 1 time, 14 of 14 cycles  Administration: 945 mg (5/6/2020), 945 mg (5/20/2020), 945 mg (6/3/2020), 945 mg (6/17/2020), 945 mg (7/29/2020), 945 mg (8/12/2020), 945 mg (8/26/2020), 945 mg (9/9/2020), 945 mg (9/23/2020), 945 mg (10/6/2020), 945 mg (10/21/2020), 945 mg (11/4/2020)  fluorouraciL 2,400 mg/m2 = 5,665 mg in sodium chloride 0.9% 240 mL chemo infusion, 2,400 mg/m2 = 5,665 mg, Intravenous, Over 46 hours, 14 of 14 cycles  Administration: 5,665 mg (5/6/2020), 5,665 mg (5/20/2020), 5,665 mg (6/3/2020), 5,665 mg (6/17/2020), 5,665 mg (7/29/2020), 5,665 mg (8/12/2020), 5,665 mg (8/26/2020), 5,665 mg (9/9/2020), 5,665 mg (9/23/2020), 5,665 mg (10/6/2020), 5,665 mg (10/21/2020), 5,665 mg (11/4/2020)  leucovorin calcium 900 mg in dextrose 5 % 250 mL infusion, 945 mg, Intravenous, [facility] 1 time, 14 of 14 cycles  Administration: 900 mg (5/6/2020), 900 mg (5/20/2020), 900 mg (6/3/2020), 900 mg (6/17/2020), 945 mg (6/30/2020), 945 mg (7/20/2020), 945 mg (7/29/2020), 945 mg (8/12/2020), 945 mg (8/26/2020),  945 mg (9/9/2020), 945 mg (9/23/2020), 945 mg (10/6/2020), 945 mg (10/21/2020), 945 mg (11/4/2020)  oxaliplatin (ELOXATIN) 200 mg in dextrose 5 % 500 mL chemo infusion, 201 mg, Intravenous, Clinic/[facility] 1 time, 6 of 6 cycles  Dose modification: 65 mg/m2 (original dose 85 mg/m2, Cycle 6)  Administration: 200 mg (5/6/2020), 200 mg (5/20/2020), 200 mg (6/3/2020), 200 mg (6/17/2020), 201 mg (6/30/2020), 150 mg (7/20/2020)       6/16/2021 -  Chemotherapy    Treatment Summary   Plan Name: OP COLORECTAL FOLFIRI + BEVACIZUMAB Q2W  Treatment Goal: Control  Status: Active  Start Date: 6/16/2021  End Date: 12/2/2022  Provider: Marah Santo MD  Chemotherapy: fluorouraciL injection 990 mg, 400 mg/m2 = 990 mg, Intravenous, Clinic/[facility] 1 time, 15 of 15 cycles  Administration: 990 mg (6/16/2021), 990 mg (6/30/2021), 990 mg (7/14/2021), 980 mg (7/28/2021), 990 mg (8/11/2021), 990 mg (8/25/2021), 990 mg (9/8/2021), 990 mg (9/22/2021), 990 mg (10/6/2021), 990 mg (10/20/2021), 990 mg (11/3/2021), 990 mg (12/1/2021), 990 mg (12/15/2021), 990 mg (11/17/2021), 990 mg (12/28/2021)  fluorouraciL 2,400 mg/m2 = 5,950 mg in sodium chloride 0.9% 240 mL chemo infusion, 2,400 mg/m2 = 5,950 mg, Intravenous, Over 46 hours, 30 of 30 cycles  Administration: 5,950 mg (6/16/2021), 5,950 mg (6/30/2021), 5,950 mg (7/14/2021), 5,880 mg (7/28/2021), 5,930 mg (8/11/2021), 5,930 mg (8/25/2021), 5,930 mg (9/8/2021), 5,930 mg (9/22/2021), 5,930 mg (10/6/2021), 5,930 mg (10/20/2021), 5,930 mg (11/3/2021), 5,930 mg (12/1/2021), 5,930 mg (12/15/2021), 5,930 mg (11/17/2021), 5,930 mg (12/28/2021), 6,050 mg (5/11/2022), 6,025 mg (3/9/2022), 6,025 mg (2/23/2022), 5,930 mg (2/2/2022), 5,930 mg (1/19/2022), 6,050 mg (4/20/2022), 6,025 mg (4/6/2022), 6,025 mg (3/23/2022), 6,050 mg (6/1/2022), 6,050 mg (6/15/2022), 6,050 mg (10/19/2022), 6,050 mg (10/5/2022), 6,050 mg (11/2/2022), 6,050 mg (11/16/2022), 6,050 mg (11/30/2022)  bevacizumab (AVASTIN) 600 mg in sodium  chloride 0.9% 100 mL chemo infusion, 660 mg, Intravenous, Clinic/[facility] 1 time, 28 of 28 cycles  Dose modification: 5 mg/kg (original dose 5 mg/kg, Cycle 26, Reason: MD Discretion, Comment: 5 mg/kg original dose)  Administration: 600 mg (6/30/2021), 600 mg (7/14/2021), 600 mg (7/28/2021), 600 mg (6/16/2021), 600 mg (8/11/2021), 655 mg (8/25/2021), 600 mg (9/8/2021), 600 mg (9/22/2021), 600 mg (10/6/2021), 600 mg (10/20/2021), 600 mg (11/3/2021), 655 mg (12/1/2021), 600 mg (12/15/2021), 600 mg (11/17/2021), 600 mg (12/28/2021), 600 mg (5/11/2022), 600 mg (3/9/2022), 600 mg (2/23/2022), 600 mg (2/2/2022), 600 mg (1/19/2022), 600 mg (4/20/2022), 680 mg (4/6/2022), 600 mg (3/23/2022), 680 mg (10/5/2022), 680 mg (10/19/2022), 680 mg (11/2/2022), 680 mg (11/16/2022), 680 mg (11/30/2022)  irinotecan (CAMPTOSAR) 440 mg in sodium chloride 0.9% 500 mL chemo infusion, 446 mg, Intravenous, Clinic/[facility] 1 time, 30 of 30 cycles  Dose modification: 180 mg/m2 (original dose 180 mg/m2, Cycle 24)  Administration: 440 mg (6/16/2021), 440 mg (6/30/2021), 440 mg (7/14/2021), 440 mg (7/28/2021), 440 mg (8/11/2021), 444 mg (8/25/2021), 440 mg (9/8/2021), 440 mg (9/22/2021), 440 mg (10/6/2021), 440 mg (10/20/2021), 440 mg (11/3/2021), 440 mg (12/1/2021), 440 mg (12/15/2021), 440 mg (11/17/2021), 440 mg (12/28/2021), 440 mg (5/11/2022), 440 mg (3/9/2022), 440 mg (2/23/2022), 440 mg (2/2/2022), 440 mg (1/19/2022), 440 mg (4/20/2022), 440 mg (4/6/2022), 440 mg (3/24/2022), 440 mg (6/1/2022), 440 mg (6/15/2022), 440 mg (10/19/2022), 440 mg (10/5/2022), 440 mg (11/2/2022), 440 mg (11/16/2022), 440 mg (11/30/2022)       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   4/3/2020 Initial Diagnosis    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     5/6/2020 - 11/17/2020 Chemotherapy    Treatment Summary   Plan Name: OP FOLFOX 6 Q2W  Treatment Goal: Curative  Status: Inactive  Start Date: 5/6/2020  End Date: 11/6/2020  Provider: Dylan Leyva MD  Chemotherapy:  fluorouraciL injection 945 mg, 400 mg/m2 = 945 mg, Intravenous, Clinic/[facility] 1 time, 14 of 14 cycles  Administration: 945 mg (5/6/2020), 945 mg (5/20/2020), 945 mg (6/3/2020), 945 mg (6/17/2020), 945 mg (7/29/2020), 945 mg (8/12/2020), 945 mg (8/26/2020), 945 mg (9/9/2020), 945 mg (9/23/2020), 945 mg (10/6/2020), 945 mg (10/21/2020), 945 mg (11/4/2020)  fluorouraciL 2,400 mg/m2 = 5,665 mg in sodium chloride 0.9% 240 mL chemo infusion, 2,400 mg/m2 = 5,665 mg, Intravenous, Over 46 hours, 14 of 14 cycles  Administration: 5,665 mg (5/6/2020), 5,665 mg (5/20/2020), 5,665 mg (6/3/2020), 5,665 mg (6/17/2020), 5,665 mg (7/29/2020), 5,665 mg (8/12/2020), 5,665 mg (8/26/2020), 5,665 mg (9/9/2020), 5,665 mg (9/23/2020), 5,665 mg (10/6/2020), 5,665 mg (10/21/2020), 5,665 mg (11/4/2020)  leucovorin calcium 900 mg in dextrose 5 % 250 mL infusion, 945 mg, Intravenous, Clinic/[facility] 1 time, 14 of 14 cycles  Administration: 900 mg (5/6/2020), 900 mg (5/20/2020), 900 mg (6/3/2020), 900 mg (6/17/2020), 945 mg (6/30/2020), 945 mg (7/20/2020), 945 mg (7/29/2020), 945 mg (8/12/2020), 945 mg (8/26/2020), 945 mg (9/9/2020), 945 mg (9/23/2020), 945 mg (10/6/2020), 945 mg (10/21/2020), 945 mg (11/4/2020)  oxaliplatin (ELOXATIN) 200 mg in dextrose 5 % 500 mL chemo infusion, 201 mg, Intravenous, Clinic/[facility] 1 time, 6 of 6 cycles  Dose modification: 65 mg/m2 (original dose 85 mg/m2, Cycle 6)  Administration: 200 mg (5/6/2020), 200 mg (5/20/2020), 200 mg (6/3/2020), 200 mg (6/17/2020), 201 mg (6/30/2020), 150 mg (7/20/2020)       6/16/2021 -  Chemotherapy    Treatment Summary   Plan Name: OP COLORECTAL FOLFIRI + BEVACIZUMAB Q2W  Treatment Goal: Control  Status: Active  Start Date: 6/16/2021  End Date: 12/2/2022  Provider: Marah Santo MD  Chemotherapy: fluorouraciL injection 990 mg, 400 mg/m2 = 990 mg, Intravenous, Clinic/[facility] 1 time, 15 of 15 cycles  Administration: 990 mg (6/16/2021), 990 mg (6/30/2021), 990 mg (7/14/2021), 980 mg (7/28/2021),  990 mg (8/11/2021), 990 mg (8/25/2021), 990 mg (9/8/2021), 990 mg (9/22/2021), 990 mg (10/6/2021), 990 mg (10/20/2021), 990 mg (11/3/2021), 990 mg (12/1/2021), 990 mg (12/15/2021), 990 mg (11/17/2021), 990 mg (12/28/2021)  fluorouraciL 2,400 mg/m2 = 5,950 mg in sodium chloride 0.9% 240 mL chemo infusion, 2,400 mg/m2 = 5,950 mg, Intravenous, Over 46 hours, 30 of 30 cycles  Administration: 5,950 mg (6/16/2021), 5,950 mg (6/30/2021), 5,950 mg (7/14/2021), 5,880 mg (7/28/2021), 5,930 mg (8/11/2021), 5,930 mg (8/25/2021), 5,930 mg (9/8/2021), 5,930 mg (9/22/2021), 5,930 mg (10/6/2021), 5,930 mg (10/20/2021), 5,930 mg (11/3/2021), 5,930 mg (12/1/2021), 5,930 mg (12/15/2021), 5,930 mg (11/17/2021), 5,930 mg (12/28/2021), 6,050 mg (5/11/2022), 6,025 mg (3/9/2022), 6,025 mg (2/23/2022), 5,930 mg (2/2/2022), 5,930 mg (1/19/2022), 6,050 mg (4/20/2022), 6,025 mg (4/6/2022), 6,025 mg (3/23/2022), 6,050 mg (6/1/2022), 6,050 mg (6/15/2022), 6,050 mg (10/19/2022), 6,050 mg (10/5/2022), 6,050 mg (11/2/2022), 6,050 mg (11/16/2022), 6,050 mg (11/30/2022)  bevacizumab (AVASTIN) 600 mg in sodium chloride 0.9% 100 mL chemo infusion, 660 mg, Intravenous, [facility] 1 time, 28 of 28 cycles  Dose modification: 5 mg/kg (original dose 5 mg/kg, Cycle 26, Reason: MD Discretion, Comment: 5 mg/kg original dose)  Administration: 600 mg (6/30/2021), 600 mg (7/14/2021), 600 mg (7/28/2021), 600 mg (6/16/2021), 600 mg (8/11/2021), 655 mg (8/25/2021), 600 mg (9/8/2021), 600 mg (9/22/2021), 600 mg (10/6/2021), 600 mg (10/20/2021), 600 mg (11/3/2021), 655 mg (12/1/2021), 600 mg (12/15/2021), 600 mg (11/17/2021), 600 mg (12/28/2021), 600 mg (5/11/2022), 600 mg (3/9/2022), 600 mg (2/23/2022), 600 mg (2/2/2022), 600 mg (1/19/2022), 600 mg (4/20/2022), 680 mg (4/6/2022), 600 mg (3/23/2022), 680 mg (10/5/2022), 680 mg (10/19/2022), 680 mg (11/2/2022), 680 mg (11/16/2022), 680 mg (11/30/2022)  irinotecan (CAMPTOSAR) 440 mg in sodium chloride 0.9% 500 mL chemo  infusion, 446 mg, Intravenous, [facility] 1 time, 30 of 30 cycles  Dose modification: 180 mg/m2 (original dose 180 mg/m2, Cycle 24)  Administration: 440 mg (2021), 440 mg (2021), 440 mg (2021), 440 mg (2021), 440 mg (2021), 444 mg (2021), 440 mg (2021), 440 mg (2021), 440 mg (10/6/2021), 440 mg (10/20/2021), 440 mg (11/3/2021), 440 mg (2021), 440 mg (12/15/2021), 440 mg (2021), 440 mg (2021), 440 mg (2022), 440 mg (3/9/2022), 440 mg (2022), 440 mg (2022), 440 mg (2022), 440 mg (2022), 440 mg (2022), 440 mg (3/24/2022), 440 mg (2022), 440 mg (6/15/2022), 440 mg (10/19/2022), 440 mg (10/5/2022), 440 mg (2022), 440 mg (2022), 440 mg (2022)           History:  Past Medical History:   Diagnosis Date    Anxiety     Depression     FH: ovarian cancer 3/16/2020    Hx of psychiatric care     Effexor, Paxil, Lexapro, Zoloft, Wellbutrin, Trazodone Buspar    Hyperthyroidism     Hypothyroid     Kidney calculi     Malignant neoplasm of sigmoid colon 3/16/2020    Menorrhagia     Multinodular goiter 2012    Palpitation     Psychiatric problem     Venous insufficiency       Past Surgical History:   Procedure Laterality Date     SECTION, CLASSIC      x3    COLONOSCOPY N/A 2020    Procedure: COLONOSCOPY;  Surgeon: Shane Parker MD;  Location: [facility];  Service: Endoscopy;  Laterality: N/A;    COLONOSCOPY N/A 2022    Procedure: COLONOSCOPY;  Surgeon: Shane Parker MD;  Location: [facility];  Service: Endoscopy;  Laterality: N/A;    CYSTOSCOPY W/ URETERAL STENT PLACEMENT Bilateral 3/25/2020    Procedure: CYSTOSCOPY, WITH URETERAL STENT INSERTION;  Surgeon: Claudio Tyson MD;  Location: [address];  Service: Urology;  Laterality: Bilateral;    INSERTION OF TUNNELED CENTRAL VENOUS CATHETER (CVC) WITH SUBCUTANEOUS PORT N/A 2020    Procedure: [id number];  Surgeon: Dosc Diagnostic  Provider;  [facility];  Service: Radiology;  Laterality: N/A;  Room 189/Cindy    LAPAROSCOPIC SIGMOIDECTOMY N/A 3/25/2020    Procedure: COLECTOMY, SIGMOID, LAPAROSCOPIC, flex sig, ERAS high;  Surgeon: Silvio Man MD;  Location: [facility] OR 2ND FLR;  Service: Colon and Rectal;  Laterality: N/A;    MOBILIZATION OF SPLENIC FLEXURE  3/25/2020    Procedure: MOBILIZATION, SPLENIC FLEXURE;  Surgeon: Silvio Man MD;  [facility];  Service: Colon and Rectal;;    tonsillectomy      TOTAL ABDOMINAL HYSTERECTOMY W/ BILATERAL SALPINGOOPHORECTOMY N/A 3/25/2020    Procedure: HYSTERECTOMY, TOTAL, ABDOMINAL, WITH BILATERAL SALPINGO-OOPHORECTOMY;  Surgeon: Keron Brady MD;  [facility];  Service: Oncology;  Laterality: N/A;     Family History   Problem Relation Age of Onset    Heart disease Father     Diabetes Mother 65    Drug abuse Brother     Drug abuse Brother     Cancer Maternal Aunt         lung cancer    Cancer Maternal Grandmother         stomach cancer- started in ovaries    Ovarian cancer Maternal Grandmother         stomach cancer- started in ovaries    Colon cancer Paternal Uncle     Cancer Paternal Uncle     Ovarian cancer Maternal Aunt     Ovarian cancer Maternal Aunt     Ovarian cancer Cousin         mother had ovarian cancer    Drug abuse Son     Drug abuse Son         clean/sober since 2012    Colon cancer Son     No Known Problems Daughter     Uterine cancer Neg Hx     Breast cancer Neg Hx      Social History     Tobacco Use    Smoking status: Never    Smokeless tobacco: Never   Substance and Sexual Activity    Alcohol use: Yes     Comment: occasionally- twice monthly    Drug use: Never    Sexual activity: Yes     Partners: Male     Birth control/protection: See Surgical Hx        ROS:   Review of Systems   Constitutional:  Positive for malaise/fatigue (first few days). Negative for fever and weight loss.   HENT:  Negative for congestion, hearing  "loss, nosebleeds and sore throat.    Eyes:  Negative for double vision and photophobia.   Respiratory:  Negative for cough, hemoptysis, sputum production, shortness of breath and wheezing.    Cardiovascular:  Negative for chest pain, palpitations, orthopnea and leg swelling.   Gastrointestinal:  Positive for constipation (intermittent) and nausea (improving). Negative for abdominal pain, blood in stool, diarrhea, heartburn and vomiting.   Genitourinary:  Negative for dysuria, frequency, hematuria and urgency.   Musculoskeletal:  Negative for back pain, joint pain and myalgias.   Skin:  Negative for itching and rash.   Neurological:  Negative for dizziness, tingling, seizures and weakness.   Endo/Heme/Allergies:  Negative for polydipsia. Does not bruise/bleed easily.   Psychiatric/Behavioral:  Negative for depression and memory loss. The patient is nervous/anxious. The patient does not have insomnia.       Objective:     Vitals:    12/12/22 1524   BP: 102/72   Pulse: 102   Resp: 18   Temp: 97.1 °F (36.2 °C)   TempSrc: Temporal   SpO2: 98%   Weight: 134.6 kg (296 lb 11.8 oz)   Height: 5' 6" (1.676 m)   PainSc: 0-No pain             Wt Readings from Last 10 Encounters:   12/12/22 134.6 kg (296 lb 11.8 oz)   12/02/22 133.8 kg (294 lb 15.6 oz)   11/30/22 133.8 kg (294 lb 15.6 oz)   11/29/22 133.8 kg (294 lb 15.6 oz)   11/18/22 134 kg (295 lb 6.7 oz)   11/16/22 134 kg (295 lb 6.7 oz)   11/14/22 133.7 kg (294 lb 12.1 oz)   11/04/22 133 kg (293 lb 3.4 oz)   11/02/22 133 kg (293 lb 3.4 oz)   10/31/22 133 kg (293 lb 3.4 oz)       Physical Examination:   Physical Exam  Vitals and nursing note reviewed.   Constitutional:       General: She is not in acute distress.     Appearance: She is not diaphoretic.   HENT:      Head: Normocephalic.      Mouth/Throat:      Pharynx: No oropharyngeal exudate.   Eyes:      General: No scleral icterus.     Conjunctiva/sclera: Conjunctivae normal.   Neck:      Thyroid: No thyromegaly. "   Cardiovascular:      Rate and Rhythm: Normal rate and regular rhythm.      Heart sounds: Normal heart sounds. No murmur heard.  Pulmonary:      Effort: Pulmonary effort is normal. No respiratory distress.      Breath sounds: No stridor. No wheezing or rales.   Chest:      Chest wall: No tenderness.   Abdominal:      General: Bowel sounds are normal. There is no distension.      Palpations: Abdomen is soft. There is no mass.      Tenderness: There is no abdominal tenderness. There is no rebound.   Musculoskeletal:         General: No tenderness or deformity. Normal range of motion.      Cervical back: Neck supple.   Lymphadenopathy:      Cervical: No cervical adenopathy.   Skin:     General: Skin is warm and dry.      Findings: No erythema or rash.   Neurological:      Mental Status: She is alert and oriented to person, place, and time.      Cranial Nerves: No cranial nerve deficit.      Coordination: Coordination normal.      Gait: Gait is intact.   Psychiatric:         Mood and Affect: Affect normal.         Cognition and Memory: Memory normal.         Judgment: Judgment normal.        Diagnostic Tests:  Significant Imaging: I have reviewed and interpreted all pertinent imaging results/findings.  Laboratory Data:  All pertinent labs have been reviewed.  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 Oxaliplatin was stopped due to severe adverse reaction.     Follow-up CTs and PET in May 2021 showed enlarging retroperitoneal node, concerning for metastatic disease.      She started FOLFIRI + Avastin and has continued till July 2022.   Given isolated RP toni disease, she has undergone open Left periaortic and aortocaval lymph node dissection on 7/12/2022.     Follow up scans show disease progression. I have discussed the case with Dr. Montelongo at [facility]. We discussed resuming FOLFIRI-debo vs transitioning to BRAF directed therapy (BEACON).   There may be an option ton enroll in SWOG 2107 (encorafenib/cetuximab +/- nivo) at [facility] if no prior BRAF inhibitor exposure.     Patient is interested in the trial and therefore we resumed FOLFIRI- Debo.     Follow up PET does show lymph node metastasis, increased since April but not compared to the most recent scans at [facility]. Hold treatment until scans are compared and response vs progression is established. We have requested discs.     By the time of this dictation, I have discussed the plan with  as well. Given financial restraints, she may not be able to enroll in a clinical trial at [facility]. If she has progressive disease and unable to got to [facility] and trial not available locally, will treat with next line treatment with encorafenib and cetuximab.     Nausea   Improving on compazine.     Mucositis   Continue [facility] soln.     Transaminitis  Fluctuates.  Continue to monitor. No obvious liver mets.     Hypercalcemia   Mild, secondary to hyperparathyroidism.  Avoid calcium supplements.     Hypothyroidism  Multinodular goiter   Continue Levothyroxine. Endocrine consult is appreciated.   Had a non diagnostic biopsy in 2012, usg findings have remained stable. However, given uptake on PET this year, an FNA vs repeat usg in 6 months is recommended. Will follow closely.     Essential HTN   Continue antihypertensives.      Anxiety   Continue to   follow up with Dr. Palm.     MDM includes  :    - Acute or chronic illness or injury that poses a threat to life or bodily function  - Independent review and explanation of 3+ results from unique tests  - Discussion of management and ordering 3+ unique tests  - Extensive discussion of treatment and management  - Prescription drug management  - Drug therapy requiring intensive monitoring for toxicity          ECOG SCORE    1 - Restricted in strenuous activity-ambulatory and able to carry out work of a light nature           Discussion:   No follow-ups on file.    Plan was discussed with the patient at length, and she verbalized understanding. Gail was given an opportunity to ask questions that were answered to her satisfaction, and she was advised to call in the interval if any problems or questions arise.    Electronically signed by Marah Santo MD        Route Chart for Scheduling    Med Onc Chart Routing      Follow up with physician 1 week.   Follow up with [facility]    Infusion scheduling note    Injection scheduling note    Labs    Imaging    Pharmacy appointment    Other referrals        Treatment Plan Information   OP COLORECTAL FOLFIRI + BEVACIZUMAB Q2W   Marah Santo MD   Upcoming Treatment Dates - OP COLORECTAL FOLFIRI + BEVACIZUMAB Q2W    No upcoming days in selected categories.    Supportive Plan Information  IV FLUIDS AND ELECTROLYTES   Brayden Solo MD   Upcoming Treatment Dates - IV FLUIDS AND ELECTROLYTES    No upcoming days in selected categories.    Therapy Plan Information  PORT FLUSH  Flushes  heparin, porcine (PF) 100 unit/mL injection flush 500 Units  500 Units, Intravenous, Every visit  sodium chloride 0.9% flush 10 mL  10 mL, Intravenous, Every visit            Answers submitted by the patient for this visit:  Review of Systems Questionnaire (Submitted on 12/9/2022)  appetite change : No  unexpected weight change: No  mouth sores: Yes  visual disturbance: No  adenopathy: No

## 2022-12-12 NOTE — PROGRESS NOTES
PSYCHO-ONCOLOGY NOTE/ Individual Psychotherapy     Date: 12/12/2022   Site:  [facility]      Therapeutic Intervention: Met with patient.  Outpatient - Insight oriented psychotherapy 45 min - CPT code 27430, Outpatient - Behavior modifying psychotherapy 45 min - CPT code 61184, and Outpatient - Supportive psychotherapy 45 min - CPT Code 47592    This includes face to face time and non-face to face time preparing to see the patient, obtaining and/or reviewing separately obtained history, documenting clinical information in the electronic or other health record, independently interpreting results and communicating results to the patient/family/caregiver, or care coordinator.      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    Anxiety    Insomnia    Insomnia    Anxiety    Other constipation    Chemotherapy-induced nausea    Generalized anxiety disorder with panic attacks    Chemotherapy adverse reaction    Mucositis due to chemotherapy    Morbid obesity    Secondary adenocarcinoma of lymph node    Thyroid nodule    Hypercalcemia    Transaminitis    Dysuria    Chemotherapy-induced neutropenia    Hypophosphatemia    Functional diarrhea    Primary hypertension       Chief complaint/reason for encounter: adjustment to illness, depression and anxiety      Met with patient to evaluate psychosocial adaptation to diagnosis/treatment of metastatic colon cancer    Current Medications  Current Outpatient Medications   Medication    acetaminophen (TYLENOL) 500 MG tablet    buPROPion (WELLBUTRIN SR) 150 MG TBSR 12 hr tablet    busPIRone  (BUSPAR) 10 MG tablet    granisetron (SANCUSO) 3.1 mg/24 hour    Lactobacillus rhamnosus GG (CULTURELLE) 10 billion cell capsule    lactulose (CHRONULAC) 10 gram/15 mL solution    levothyroxine (SYNTHROID) 200 MCG tablet    LIDOcaine-prilocaine (EMLA) cream    LORazepam (ATIVAN) 1 MG tablet    magic mouthwash diphen/antac/lidoc/nysta    multivitamin (THERAGRAN) per tablet    olmesartan (BENICAR) 20 MG tablet    ondansetron (ZOFRAN-ODT) 8 MG TbDL    prochlorperazine (COMPAZINE) 10 MG tablet    promethazine (PHENERGAN) 12.5 MG Tab    senna-docusate 8.6-50 mg (PERICOLACE) 8.6-50 mg per tablet    tamsulosin (FLOMAX) 0.4 mg Cap    traMADoL (ULTRAM) 50 mg tablet    traZODone (DESYREL) 50 MG tablet     No current facility-administered medications for this visit.       ONCOLOGY HISTORY  Oncology History   Malignant neoplasm of sigmoid colon   3/16/2020 Initial Diagnosis    Malignant neoplasm of sigmoid colon     3/31/2020 Cancer Staged    Staging form: Colon and Rectum, AJCC 8th Edition  - Clinical stage from 3/31/2020: Stage IIIC (cT4b, cN2a, cM0)       5/6/2020 - 11/17/2020 Chemotherapy    Treatment Summary   Plan Name: OP FOLFOX 6 Q2W  Treatment Goal: Curative  Status: Inactive  Start Date: 5/6/2020  End Date: 11/6/2020  Provider: Dylan Leyva MD  Chemotherapy: fluorouraciL injection 945 mg, 400 mg/m2 = 945 mg, Intravenous, Clinic/[facility] 1 time, 14 of 14 cycles  Administration: 945 mg (5/6/2020), 945 mg (5/20/2020), 945 mg (6/3/2020), 945 mg (6/17/2020), 945 mg (7/29/2020), 945 mg (8/12/2020), 945 mg (8/26/2020), 945 mg (9/9/2020), 945 mg (9/23/2020), 945 mg (10/6/2020), 945 mg (10/21/2020), 945 mg (11/4/2020)  fluorouraciL 2,400 mg/m2 = 5,665 mg in sodium chloride 0.9% 240 mL chemo infusion, 2,400 mg/m2 = 5,665 mg, Intravenous, Over 46 hours, 14 of 14 cycles  Administration: 5,665 mg (5/6/2020), 5,665 mg (5/20/2020), 5,665 mg (6/3/2020), 5,665 mg (6/17/2020), 5,665 mg (7/29/2020), 5,665 mg (8/12/2020), 5,665 mg  (8/26/2020), 5,665 mg (9/9/2020), 5,665 mg (9/23/2020), 5,665 mg (10/6/2020), 5,665 mg (10/21/2020), 5,665 mg (11/4/2020)  leucovorin calcium 900 mg in dextrose 5 % 250 mL infusion, 945 mg, Intravenous, [facility] 1 time, 14 of 14 cycles  Administration: 900 mg (5/6/2020), 900 mg (5/20/2020), 900 mg (6/3/2020), 900 mg (6/17/2020), 945 mg (6/30/2020), 945 mg (7/20/2020), 945 mg (7/29/2020), 945 mg (8/12/2020), 945 mg (8/26/2020), 945 mg (9/9/2020), 945 mg (9/23/2020), 945 mg (10/6/2020), 945 mg (10/21/2020), 945 mg (11/4/2020)  oxaliplatin (ELOXATIN) 200 mg in dextrose 5 % 500 mL chemo infusion, 201 mg, Intravenous, [facility] 1 time, 6 of 6 cycles  Dose modification: 65 mg/m2 (original dose 85 mg/m2, Cycle 6)  Administration: 200 mg (5/6/2020), 200 mg (5/20/2020), 200 mg (6/3/2020), 200 mg (6/17/2020), 201 mg (6/30/2020), 150 mg (7/20/2020)       6/16/2021 -  Chemotherapy    Treatment Summary   Plan Name: OP COLORECTAL FOLFIRI + BEVACIZUMAB Q2W  Treatment Goal: Control  Status: Active  Start Date: 6/16/2021  End Date: 12/2/2022  Provider: Marah Santo MD  Chemotherapy: fluorouraciL injection 990 mg, 400 mg/m2 = 990 mg, Intravenous, [facility] 1 time, 15 of 15 cycles  Administration: 990 mg (6/16/2021), 990 mg (6/30/2021), 990 mg (7/14/2021), 980 mg (7/28/2021), 990 mg (8/11/2021), 990 mg (8/25/2021), 990 mg (9/8/2021), 990 mg (9/22/2021), 990 mg (10/6/2021), 990 mg (10/20/2021), 990 mg (11/3/2021), 990 mg (12/1/2021), 990 mg (12/15/2021), 990 mg (11/17/2021), 990 mg (12/28/2021)  fluorouraciL 2,400 mg/m2 = 5,950 mg in sodium chloride 0.9% 240 mL chemo infusion, 2,400 mg/m2 = 5,950 mg, Intravenous, Over 46 hours, 30 of 30 cycles  Administration: 5,950 mg (6/16/2021), 5,950 mg (6/30/2021), 5,950 mg (7/14/2021), 5,880 mg (7/28/2021), 5,930 mg (8/11/2021), 5,930 mg (8/25/2021), 5,930 mg (9/8/2021), 5,930 mg (9/22/2021), 5,930 mg (10/6/2021), 5,930 mg (10/20/2021), 5,930 mg (11/3/2021), 5,930 mg (12/1/2021), 5,930 mg  (12/15/2021), 5,930 mg (11/17/2021), 5,930 mg (12/28/2021), 6,050 mg (5/11/2022), 6,025 mg (3/9/2022), 6,025 mg (2/23/2022), 5,930 mg (2/2/2022), 5,930 mg (1/19/2022), 6,050 mg (4/20/2022), 6,025 mg (4/6/2022), 6,025 mg (3/23/2022), 6,050 mg (6/1/2022), 6,050 mg (6/15/2022), 6,050 mg (10/19/2022), 6,050 mg (10/5/2022), 6,050 mg (11/2/2022), 6,050 mg (11/16/2022), 6,050 mg (11/30/2022)  bevacizumab (AVASTIN) 600 mg in sodium chloride 0.9% 100 mL chemo infusion, 660 mg, Intravenous, [facility] 1 time, 28 of 28 cycles  Dose modification: 5 mg/kg (original dose 5 mg/kg, Cycle 26, Reason: MD Discretion, Comment: 5 mg/kg original dose)  Administration: 600 mg (6/30/2021), 600 mg (7/14/2021), 600 mg (7/28/2021), 600 mg (6/16/2021), 600 mg (8/11/2021), 655 mg (8/25/2021), 600 mg (9/8/2021), 600 mg (9/22/2021), 600 mg (10/6/2021), 600 mg (10/20/2021), 600 mg (11/3/2021), 655 mg (12/1/2021), 600 mg (12/15/2021), 600 mg (11/17/2021), 600 mg (12/28/2021), 600 mg (5/11/2022), 600 mg (3/9/2022), 600 mg (2/23/2022), 600 mg (2/2/2022), 600 mg (1/19/2022), 600 mg (4/20/2022), 680 mg (4/6/2022), 600 mg (3/23/2022), 680 mg (10/5/2022), 680 mg (10/19/2022), 680 mg (11/2/2022), 680 mg (11/16/2022), 680 mg (11/30/2022)  irinotecan (CAMPTOSAR) 440 mg in sodium chloride 0.9% 500 mL chemo infusion, 446 mg, Intravenous, Clinic/[facility] 1 time, 30 of 30 cycles  Dose modification: 180 mg/m2 (original dose 180 mg/m2, Cycle 24)  Administration: 440 mg (6/16/2021), 440 mg (6/30/2021), 440 mg (7/14/2021), 440 mg (7/28/2021), 440 mg (8/11/2021), 444 mg (8/25/2021), 440 mg (9/8/2021), 440 mg (9/22/2021), 440 mg (10/6/2021), 440 mg (10/20/2021), 440 mg (11/3/2021), 440 mg (12/1/2021), 440 mg (12/15/2021), 440 mg (11/17/2021), 440 mg (12/28/2021), 440 mg (5/11/2022), 440 mg (3/9/2022), 440 mg (2/23/2022), 440 mg (2/2/2022), 440 mg (1/19/2022), 440 mg (4/20/2022), 440 mg (4/6/2022), 440 mg (3/24/2022), 440 mg (6/1/2022), 440 mg (6/15/2022), 440 mg  (10/19/2022), 440 mg (10/5/2022), 440 mg (11/2/2022), 440 mg (11/16/2022), 440 mg (11/30/2022)       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   4/3/2020 Initial Diagnosis    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     5/6/2020 - 11/17/2020 Chemotherapy    Treatment Summary   Plan Name: OP FOLFOX 6 Q2W  Treatment Goal: Curative  Status: Inactive  Start Date: 5/6/2020  End Date: 11/6/2020  Provider: Dylan Leyva MD  Chemotherapy: fluorouraciL injection 945 mg, 400 mg/m2 = 945 mg, Intravenous, [facility] 1 time, 14 of 14 cycles  Administration: 945 mg (5/6/2020), 945 mg (5/20/2020), 945 mg (6/3/2020), 945 mg (6/17/2020), 945 mg (7/29/2020), 945 mg (8/12/2020), 945 mg (8/26/2020), 945 mg (9/9/2020), 945 mg (9/23/2020), 945 mg (10/6/2020), 945 mg (10/21/2020), 945 mg (11/4/2020)  fluorouraciL 2,400 mg/m2 = 5,665 mg in sodium chloride 0.9% 240 mL chemo infusion, 2,400 mg/m2 = 5,665 mg, Intravenous, Over 46 hours, 14 of 14 cycles  Administration: 5,665 mg (5/6/2020), 5,665 mg (5/20/2020), 5,665 mg (6/3/2020), 5,665 mg (6/17/2020), 5,665 mg (7/29/2020), 5,665 mg (8/12/2020), 5,665 mg (8/26/2020), 5,665 mg (9/9/2020), 5,665 mg (9/23/2020), 5,665 mg (10/6/2020), 5,665 mg (10/21/2020), 5,665 mg (11/4/2020)  leucovorin calcium 900 mg in dextrose 5 % 250 mL infusion, 945 mg, Intravenous, Clinic/[facility] 1 time, 14 of 14 cycles  Administration: 900 mg (5/6/2020), 900 mg (5/20/2020), 900 mg (6/3/2020), 900 mg (6/17/2020), 945 mg (6/30/2020), 945 mg (7/20/2020), 945 mg (7/29/2020), 945 mg (8/12/2020), 945 mg (8/26/2020), 945 mg (9/9/2020), 945 mg (9/23/2020), 945 mg (10/6/2020), 945 mg (10/21/2020), 945 mg (11/4/2020)  oxaliplatin (ELOXATIN) 200 mg in dextrose 5 % 500 mL chemo infusion, 201 mg, Intravenous, Clinic/[facility] 1 time, 6 of 6 cycles  Dose modification: 65 mg/m2 (original dose 85 mg/m2, Cycle 6)  Administration: 200 mg (5/6/2020), 200 mg (5/20/2020), 200 mg (6/3/2020), 200 mg (6/17/2020), 201 mg (6/30/2020), 150 mg (7/20/2020)        6/16/2021 -  Chemotherapy    Treatment Summary   Plan Name: OP COLORECTAL FOLFIRI + BEVACIZUMAB Q2W  Treatment Goal: Control  Status: Active  Start Date: 6/16/2021  End Date: 12/2/2022  Provider: Marah Santo MD  Chemotherapy: fluorouraciL injection 990 mg, 400 mg/m2 = 990 mg, Intravenous, [facility]ospit[facility] 1 time, 15 of 15 cycles  Administration: 990 mg (6/16/2021), 990 mg (6/30/2021), 990 mg (7/14/2021), 980 mg (7/28/2021), 990 mg (8/11/2021), 990 mg (8/25/2021), 990 mg (9/8/2021), 990 mg (9/22/2021), 990 mg (10/6/2021), 990 mg (10/20/2021), 990 mg (11/3/2021), 990 mg (12/1/2021), 990 mg (12/15/2021), 990 mg (11/17/2021), 990 mg (12/28/2021)  fluorouraciL 2,400 mg/m2 = 5,950 mg in sodium chloride 0.9% 240 mL chemo infusion, 2,400 mg/m2 = 5,950 mg, Intravenous, Over 46 hours, 30 of 30 cycles  Administration: 5,950 mg (6/16/2021), 5,950 mg (6/30/2021), 5,950 mg (7/14/2021), 5,880 mg (7/28/2021), 5,930 mg (8/11/2021), 5,930 mg (8/25/2021), 5,930 mg (9/8/2021), 5,930 mg (9/22/2021), 5,930 mg (10/6/2021), 5,930 mg (10/20/2021), 5,930 mg (11/3/2021), 5,930 mg (12/1/2021), 5,930 mg (12/15/2021), 5,930 mg (11/17/2021), 5,930 mg (12/28/2021), 6,050 mg (5/11/2022), 6,025 mg (3/9/2022), 6,025 mg (2/23/2022), 5,930 mg (2/2/2022), 5,930 mg (1/19/2022), 6,050 mg (4/20/2022), 6,025 mg (4/6/2022), 6,025 mg (3/23/2022), 6,050 mg (6/1/2022), 6,050 mg (6/15/2022), 6,050 mg (10/19/2022), 6,050 mg (10/5/2022), 6,050 mg (11/2/2022), 6,050 mg (11/16/2022), 6,050 mg (11/30/2022)  bevacizumab (AVASTIN) 600 mg in sodium chloride 0.9% 100 mL chemo infusion, 660 mg, Intravenous, [facility]hospitals 1 time, 28 of 28 cycles  Dose modification: 5 mg/kg (original dose 5 mg/kg, Cycle 26, Reason: MD Discretion, Comment: 5 mg/kg original dose)  Administration: 600 mg (6/30/2021), 600 mg (7/14/2021), 600 mg (7/28/2021), 600 mg (6/16/2021), 600 mg (8/11/2021), 655 mg (8/25/2021), 600 mg (9/8/2021), 600 mg (9/22/2021), 600 mg (10/6/2021), 600 mg (10/20/2021),  600 mg (11/3/2021), 655 mg (12/1/2021), 600 mg (12/15/2021), 600 mg (11/17/2021), 600 mg (12/28/2021), 600 mg (5/11/2022), 600 mg (3/9/2022), 600 mg (2/23/2022), 600 mg (2/2/2022), 600 mg (1/19/2022), 600 mg (4/20/2022), 680 mg (4/6/2022), 600 mg (3/23/2022), 680 mg (10/5/2022), 680 mg (10/19/2022), 680 mg (11/2/2022), 680 mg (11/16/2022), 680 mg (11/30/2022)  irinotecan (CAMPTOSAR) 440 mg in sodium chloride 0.9% 500 mL chemo infusion, 446 mg, Intravenous, [facility] 1 time, 30 of 30 cycles  Dose modification: 180 mg/m2 (original dose 180 mg/m2, Cycle 24)  Administration: 440 mg (6/16/2021), 440 mg (6/30/2021), 440 mg (7/14/2021), 440 mg (7/28/2021), 440 mg (8/11/2021), 444 mg (8/25/2021), 440 mg (9/8/2021), 440 mg (9/22/2021), 440 mg (10/6/2021), 440 mg (10/20/2021), 440 mg (11/3/2021), 440 mg (12/1/2021), 440 mg (12/15/2021), 440 mg (11/17/2021), 440 mg (12/28/2021), 440 mg (5/11/2022), 440 mg (3/9/2022), 440 mg (2/23/2022), 440 mg (2/2/2022), 440 mg (1/19/2022), 440 mg (4/20/2022), 440 mg (4/6/2022), 440 mg (3/24/2022), 440 mg (6/1/2022), 440 mg (6/15/2022), 440 mg (10/19/2022), 440 mg (10/5/2022), 440 mg (11/2/2022), 440 mg (11/16/2022), 440 mg (11/30/2022)           Objective:  Gail Mckinnon arrived promptly for the session. Ms. Mckinnon was independently ambulatory at the time of session. The patient was fully cooperative throughout the session, though tearful secondary to limited knowledge regarding follow up at [facility].  Appearance: age appropriate, casually  dressed, well groomed  Behavior/Cooperation: friendly and cooperative  Speech: normal in rate, volume, and tone and appropriate quality, quantity and organization of sentences  Mood: depressed, sad  Affect: dysthymic, mood responsive to supportive care  Thought Process: goal-directed, logical  Thought Content: normal,  No delusions or paranoia; did not appear to be responding to internal stimuli during the session  Orientation: grossly  intact  Memory: grossly intact  Attention Span/Concentration: Attends to session without distraction; reports no difficulty  Fund of Knowledge: average  Estimate of Intelligence: average from verbal skills and history  Cognition: grossly intact  Insight: patient has awareness of illness; good insight into own behavior and behavior of others  Judgment: the patient's behavior is adequate to circumstances    [facility]:   DISTRESS SCREENING 12/9/2022 11/28/2022 11/13/2022 10/31/2022 10/31/2022 10/14/2022 9/30/2022   Distress Score 8 8 6 7 7 7 8   Practical Problems Housing;Insurance/Financial;Work/School;Treatment Decisions Housing;Insurance/Financial;Treatment Decisions Insurance/Financial;Work/School;Treatment Decisions Work/School;Insurance/Financial Insurance/Financial;Work/School Insurance/Financial;Work/School;Treatment Decisions Insurance/Financial;Treatment Decisions   Family Problems None of these Family Health Issues Family Health Issues None of these None of these None of these None of these   Emotional Problems Depression;Fears;Sadness;Worry Depression;Fears;Sadness;Worry Depression;Sadness;Worry Worry;Depression Depression;Worry Depression;Fears;Worry Depression;Sadness;Worry   Spiritual / Hindu Concerns No No No No No No No   Physical Problems Appearance;Fatigue;Feeling Swollen;Mouth Sores;Nausea;Tingling in hands or feet Constipation;Fatigue;Feeling Swollen;Getting Around;Memory/Concentration;Mouth Sores;Nausea;Pain;Skin Dry/Itchy;Sleep;Tingling in hands or feet Appearance;Constipation;Fatigue;Getting Around;Memory/Concentration;Mouth Sores;Pain Appearance;Sleep;Mouth Sores;Fatigue Appearance;Fatigue;Feeling Swollen;Mouth Sores;Nausea Fatigue;Sleep Fatigue   Other Problems - - - - - - -   Some encounter information is confidential and restricted. Go to Review Flowsheets activity to see all data.        Interval history and content of current session: Discussed uncertainty regarding  "future tx plans and holiday season. Reports to be coping with great difficulty. Evaluated cognitive response, paying particular attention to negative intrusive thoughts of a persistent and detrimental nature. Thoughts of this type are in evidence with moderate/high distress. Provided cognitive behavioral therapy to address negative cognitions, exploring strategies to adaptively cope w/ uncertainty at length. Discussed holidays and prior tendency to "over-give" gifts in the past and the difficulty in inability to continue to do so. Encouraged continued use of pacing behaviors w/ job search/home tasks. Identified and evaluated psychosocial and environmental stressors secondary to diagnosis and treatment.  Examined proactive behaviors that may be implemented to minimize or ameliorate psychosocial stressors secondary to diagnosis and treatment.     Risk parameters:   Patient reports no suicidal ideation  Patient reports no homicidal ideation  Patient reports no self-injurious behavior  Patient reports no violent behavior   Safety needs:  None at this time      Verbal deficits: None     Patient's response to intervention:The patient's response to intervention is accepting, motivated.     Progress toward goals and other mental status changes:  The patient's progress toward goals is fair .      Progress to date:Progress - Ongoing, but Slow      Goals from last visit: Attempted, partially met        Patient Strengths: verbal, motivated, intelligent, successful, good social support, good insight, commitment to wellness, strong cultural traditions        Treatment Plan:individual psychotherapy  Target symptoms: depression, anxiety, adjustment  Why chosen therapy is appropriate versus another modality: relevant to diagnosis, patient responds to this modality, evidence based practice  Outcome monitoring methods: self-report, observation, tx team feedback  Therapeutic intervention type: insight oriented psychotherapy, supportive " psychotherapy  Prognosis: Good                            Behavioral goals:               Exercise: continued/increased as per physician recommendations              Stress management: appropriate boundaries and accepting aid when needed              Social engagement: continued and increased especially w/ grandchildren/family, as per CDC COVID-19 guidelines              Therapy:  adaptive coping, CBT (cognitive restructuring), management of all or nothing thinking patterns, control in knowing limitations    Return to clinic: 2 weeks     Length of Service (minutes direct face-to-face contact): 45    Diagnosis:     ICD-10-CM ICD-9-CM   1. Moderate episode of recurrent major depressive disorder  F33.1 296.32   2. Anxiety associated with cancer diagnosis  F41.1 300.09    C80.1    3. Malignant neoplasm of sigmoid colon  C18.7 153.3                Marky Palm Psy.D.  [place] License #[id number]  [place] License #[id number]

## 2022-12-13 ENCOUNTER — PATIENT MESSAGE (OUTPATIENT)
Dept: HEMATOLOGY/ONCOLOGY | Facility: CLINIC | Age: 52
End: 2022-12-13
Payer: MEDICAID

## 2022-12-16 ENCOUNTER — PATIENT MESSAGE (OUTPATIENT)
Dept: HEMATOLOGY/ONCOLOGY | Facility: CLINIC | Age: 52
End: 2022-12-16
Payer: MEDICAID

## 2022-12-19 ENCOUNTER — DOCUMENTATION ONLY (OUTPATIENT)
Dept: INFUSION THERAPY | Facility: HOSPITAL | Age: 52
End: 2022-12-19

## 2022-12-19 ENCOUNTER — OFFICE VISIT (OUTPATIENT)
Dept: HEMATOLOGY/ONCOLOGY | Facility: CLINIC | Age: 52
End: 2022-12-19
Payer: MEDICAID

## 2022-12-19 ENCOUNTER — INFUSION (OUTPATIENT)
Dept: INFUSION THERAPY | Facility: HOSPITAL | Age: 52
End: 2022-12-19
Attending: INTERNAL MEDICINE
Payer: MEDICAID

## 2022-12-19 VITALS
OXYGEN SATURATION: 97 % | WEIGHT: 293 LBS | HEIGHT: 66 IN | HEART RATE: 92 BPM | SYSTOLIC BLOOD PRESSURE: 139 MMHG | TEMPERATURE: 97 F | DIASTOLIC BLOOD PRESSURE: 86 MMHG | RESPIRATION RATE: 16 BRPM | BODY MASS INDEX: 47.09 KG/M2

## 2022-12-19 VITALS
HEART RATE: 95 BPM | BODY MASS INDEX: 47.09 KG/M2 | RESPIRATION RATE: 18 BRPM | SYSTOLIC BLOOD PRESSURE: 112 MMHG | TEMPERATURE: 97 F | DIASTOLIC BLOOD PRESSURE: 72 MMHG | WEIGHT: 293 LBS | OXYGEN SATURATION: 97 % | HEIGHT: 66 IN

## 2022-12-19 DIAGNOSIS — E04.1 THYROID NODULE: ICD-10-CM

## 2022-12-19 DIAGNOSIS — C18.7 MALIGNANT NEOPLASM OF SIGMOID COLON: ICD-10-CM

## 2022-12-19 DIAGNOSIS — C77.2 METASTASIS TO INTESTINAL LYMPH NODE: ICD-10-CM

## 2022-12-19 DIAGNOSIS — C77.2 METASTASIS TO INTESTINAL LYMPH NODE: Primary | ICD-10-CM

## 2022-12-19 DIAGNOSIS — R74.01 TRANSAMINITIS: ICD-10-CM

## 2022-12-19 DIAGNOSIS — K12.31 MUCOSITIS DUE TO CHEMOTHERAPY: ICD-10-CM

## 2022-12-19 DIAGNOSIS — C18.7 MALIGNANT NEOPLASM OF SIGMOID COLON: Primary | ICD-10-CM

## 2022-12-19 DIAGNOSIS — E83.52 HYPERCALCEMIA: ICD-10-CM

## 2022-12-19 DIAGNOSIS — F41.9 ANXIETY: ICD-10-CM

## 2022-12-19 DIAGNOSIS — C77.9 SECONDARY ADENOCARCINOMA OF LYMPH NODE: ICD-10-CM

## 2022-12-19 PROCEDURE — 96413 CHEMO IV INFUSION 1 HR: CPT | Mod: PN

## 2022-12-19 PROCEDURE — 3074F SYST BP LT 130 MM HG: CPT | Mod: CPTII,,, | Performed by: INTERNAL MEDICINE

## 2022-12-19 PROCEDURE — 96417 CHEMO IV INFUS EACH ADDL SEQ: CPT | Mod: PN

## 2022-12-19 PROCEDURE — 3074F PR MOST RECENT SYSTOLIC BLOOD PRESSURE < 130 MM HG: ICD-10-PCS | Mod: CPTII,,, | Performed by: INTERNAL MEDICINE

## 2022-12-19 PROCEDURE — 3078F DIAST BP <80 MM HG: CPT | Mod: CPTII,,, | Performed by: INTERNAL MEDICINE

## 2022-12-19 PROCEDURE — 99999 PR PBB SHADOW E&M-EST. PATIENT-LVL IV: CPT | Mod: PBBFAC,,, | Performed by: INTERNAL MEDICINE

## 2022-12-19 PROCEDURE — 63600175 PHARM REV CODE 636 W HCPCS: Mod: PN | Performed by: INTERNAL MEDICINE

## 2022-12-19 PROCEDURE — 99999 PR PBB SHADOW E&M-EST. PATIENT-LVL IV: ICD-10-PCS | Mod: PBBFAC,,, | Performed by: INTERNAL MEDICINE

## 2022-12-19 PROCEDURE — 96368 THER/DIAG CONCURRENT INF: CPT | Mod: PN

## 2022-12-19 PROCEDURE — 3008F PR BODY MASS INDEX (BMI) DOCUMENTED: ICD-10-PCS | Mod: CPTII,,, | Performed by: INTERNAL MEDICINE

## 2022-12-19 PROCEDURE — 4010F ACE/ARB THERAPY RXD/TAKEN: CPT | Mod: CPTII,,, | Performed by: INTERNAL MEDICINE

## 2022-12-19 PROCEDURE — 1159F PR MEDICATION LIST DOCUMENTED IN MEDICAL RECORD: ICD-10-PCS | Mod: CPTII,,, | Performed by: INTERNAL MEDICINE

## 2022-12-19 PROCEDURE — 96375 TX/PRO/DX INJ NEW DRUG ADDON: CPT | Mod: PN

## 2022-12-19 PROCEDURE — 99215 OFFICE O/P EST HI 40 MIN: CPT | Mod: S$PBB,,, | Performed by: INTERNAL MEDICINE

## 2022-12-19 PROCEDURE — 3078F PR MOST RECENT DIASTOLIC BLOOD PRESSURE < 80 MM HG: ICD-10-PCS | Mod: CPTII,,, | Performed by: INTERNAL MEDICINE

## 2022-12-19 PROCEDURE — 99214 OFFICE O/P EST MOD 30 MIN: CPT | Mod: PBBFAC,PN,25 | Performed by: INTERNAL MEDICINE

## 2022-12-19 PROCEDURE — 96416 CHEMO PROLONG INFUSE W/PUMP: CPT | Mod: PN

## 2022-12-19 PROCEDURE — 96367 TX/PROPH/DG ADDL SEQ IV INF: CPT | Mod: PN

## 2022-12-19 PROCEDURE — 3008F BODY MASS INDEX DOCD: CPT | Mod: CPTII,,, | Performed by: INTERNAL MEDICINE

## 2022-12-19 PROCEDURE — 4010F PR ACE/ARB THEARPY RXD/TAKEN: ICD-10-PCS | Mod: CPTII,,, | Performed by: INTERNAL MEDICINE

## 2022-12-19 PROCEDURE — 1159F MED LIST DOCD IN RCRD: CPT | Mod: CPTII,,, | Performed by: INTERNAL MEDICINE

## 2022-12-19 PROCEDURE — 25000003 PHARM REV CODE 250: Mod: PN | Performed by: INTERNAL MEDICINE

## 2022-12-19 PROCEDURE — 99215 PR OFFICE/OUTPT VISIT, EST, LEVL V, 40-54 MIN: ICD-10-PCS | Mod: S$PBB,,, | Performed by: INTERNAL MEDICINE

## 2022-12-19 RX ORDER — LORAZEPAM 2 MG/ML
1 INJECTION INTRAMUSCULAR
Status: CANCELLED | OUTPATIENT
Start: 2022-12-19 | End: 2022-12-19

## 2022-12-19 RX ORDER — ATROPINE SULFATE 0.4 MG/ML
0.4 INJECTION, SOLUTION ENDOTRACHEAL; INTRAMEDULLARY; INTRAMUSCULAR; INTRAVENOUS; SUBCUTANEOUS ONCE AS NEEDED
Status: DISCONTINUED | OUTPATIENT
Start: 2022-12-19 | End: 2022-12-19 | Stop reason: HOSPADM

## 2022-12-19 RX ORDER — SODIUM CHLORIDE 9 MG/ML
1000 INJECTION, SOLUTION INTRAVENOUS
Status: CANCELLED | OUTPATIENT
Start: 2022-12-21

## 2022-12-19 RX ORDER — SODIUM CHLORIDE 0.9 % (FLUSH) 0.9 %
10 SYRINGE (ML) INJECTION
Status: CANCELLED | OUTPATIENT
Start: 2022-12-21

## 2022-12-19 RX ORDER — SODIUM CHLORIDE 0.9 % (FLUSH) 0.9 %
10 SYRINGE (ML) INJECTION
Status: CANCELLED | OUTPATIENT
Start: 2022-12-19

## 2022-12-19 RX ORDER — HEPARIN 100 UNIT/ML
500 SYRINGE INTRAVENOUS
Status: CANCELLED | OUTPATIENT
Start: 2022-12-21

## 2022-12-19 RX ORDER — HEPARIN 100 UNIT/ML
500 SYRINGE INTRAVENOUS
Status: CANCELLED | OUTPATIENT
Start: 2022-12-19

## 2022-12-19 RX ORDER — SODIUM CHLORIDE 0.9 % (FLUSH) 0.9 %
10 SYRINGE (ML) INJECTION
Status: DISCONTINUED | OUTPATIENT
Start: 2022-12-19 | End: 2022-12-19 | Stop reason: HOSPADM

## 2022-12-19 RX ORDER — LORAZEPAM 2 MG/ML
1 INJECTION INTRAMUSCULAR
Status: COMPLETED | OUTPATIENT
Start: 2022-12-19 | End: 2022-12-19

## 2022-12-19 RX ORDER — HEPARIN 100 UNIT/ML
500 SYRINGE INTRAVENOUS
Status: DISCONTINUED | OUTPATIENT
Start: 2022-12-19 | End: 2022-12-19 | Stop reason: HOSPADM

## 2022-12-19 RX ORDER — ATROPINE SULFATE 0.4 MG/ML
0.4 INJECTION, SOLUTION ENDOTRACHEAL; INTRAMEDULLARY; INTRAMUSCULAR; INTRAVENOUS; SUBCUTANEOUS ONCE AS NEEDED
Status: CANCELLED | OUTPATIENT
Start: 2022-12-19 | End: 2034-05-16

## 2022-12-19 RX ADMIN — SODIUM CHLORIDE 440 MG: 9 INJECTION, SOLUTION INTRAVENOUS at 12:12

## 2022-12-19 RX ADMIN — LORAZEPAM 1 MG: 2 INJECTION INTRAMUSCULAR; INTRAVENOUS at 12:12

## 2022-12-19 RX ADMIN — FLUOROURACIL 6050 MG: 50 INJECTION, SOLUTION INTRAVENOUS at 02:12

## 2022-12-19 RX ADMIN — BEVACIZUMAB 680 MG: 400 INJECTION, SOLUTION INTRAVENOUS at 11:12

## 2022-12-19 RX ADMIN — SODIUM CHLORIDE: 9 INJECTION, SOLUTION INTRAVENOUS at 10:12

## 2022-12-19 RX ADMIN — PALONOSETRON: 0.05 INJECTION, SOLUTION INTRAVENOUS at 12:12

## 2022-12-19 RX ADMIN — LEUCOVORIN CALCIUM 1000 MG: 200 INJECTION, POWDER, LYOPHILIZED, FOR SOLUTION INTRAMUSCULAR; INTRAVENOUS at 12:12

## 2022-12-19 RX ADMIN — PROMETHAZINE HYDROCHLORIDE 6.25 MG: 25 INJECTION INTRAMUSCULAR; INTRAVENOUS at 11:12

## 2022-12-19 NOTE — PROGRESS NOTES
PROGRESS NOTE    Subjective:       Patient ID: Gail Mckinnon is a 52 y.o. female.  MRN: [id number]  : 1970    Chief Complaint: Malignant neoplasm of sigmoid colon        History of Present Illness:   Gail Mckinnon is a 52 y.o. female who presents with colon cancer, initially stage III and now with LN recurrence.       She completed adjuvant FOLFOX in 2020. She tolerated only 4 cycles of FOLFOX before she developed an infusion reaction to oxaliplatin in cycle 5 was well as cycle 6. She then completed 6 cycles of infusional 5 FU.     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 She was offered second line therapy with FOLFIRI and Avastin.      She presented to the ED on  with left flank pain. CT renal stone study showed Moderate hydronephrosis on the left secondary to 3 mm calculus at the UPJ.    She had a PET scan mid April that showed possible progression of her disease with      She has been to [facility] for another opinion. No change was recommended in systemic therapy but she was offered surgical removal of the aorta caval nodes.  Recent sans at [facility]  show stable disease.      Surgery done 22. Received 2 more cycle of FOLFIRI without Avastin.     She had repeat imaging at [facility] on 22 that unfortunately showed disease progression since her surgery with multiple RP nodes and one mediastinal node.       PET 22  Impression:     1. Progression of disease with interval increase of abdominal and pelvic lymphadenopathy.  2. New area of moderate FDG uptake at the right half of the T9 vertebral body (image 124).  Favor degenerative disc changes.  No underlying osteolytic or osteoblastic lesion.  Recommend continued attention on follow-up.  3. No other evidence of FDG avid disease.     Interim history:    She presents for a follow up visit. See prior notes for discussion. She has resumed FOLFIRI and  Avastin and has had an 8 week follow up PET scan.   It does show disease progression but has not compared to the most recent scans done at [facility].   She had a virtual visit with Dr. Montelongo last week, scans are still not compared, discs have been sent out from [facility], not received yet.   She reports anxiety and frustration with not knowing scan results.     She is following up with Dr. Palm.         Oncology History:  Oncology History   Malignant neoplasm of sigmoid colon   3/16/2020 Initial Diagnosis    Malignant neoplasm of sigmoid colon     3/31/2020 Cancer Staged    Staging form: Colon and Rectum, AJCC 8th Edition  - Clinical stage from 3/31/2020: Stage IIIC (cT4b, cN2a, cM0)       5/6/2020 - 11/17/2020 Chemotherapy    Treatment Summary   Plan Name: OP FOLFOX 6 Q2W  Treatment Goal: Curative  Status: Inactive  Start Date: 5/6/2020  End Date: 11/6/2020  Provider: Dylan Leyva MD  Chemotherapy: fluorouraciL injection 945 mg, 400 mg/m2 = 945 mg, Intravenous, Clinic/HOD 1 time, 14 of 14 cycles  Administration: 945 mg (5/6/2020), 945 mg (5/20/2020), 945 mg (6/3/2020), 945 mg (6/17/2020), 945 mg (7/29/2020), 945 mg (8/12/2020), 945 mg (8/26/2020), 945 mg (9/9/2020), 945 mg (9/23/2020), 945 mg (10/6/2020), 945 mg (10/21/2020), 945 mg (11/4/2020)  fluorouraciL 2,400 mg/m2 = 5,665 mg in sodium chloride 0.9% 240 mL chemo infusion, 2,400 mg/m2 = 5,665 mg, Intravenous, Over 46 hours, 14 of 14 cycles  Administration: 5,665 mg (5/6/2020), 5,665 mg (5/20/2020), 5,665 mg (6/3/2020), 5,665 mg (6/17/2020), 5,665 mg (7/29/2020), 5,665 mg (8/12/2020), 5,665 mg (8/26/2020), 5,665 mg (9/9/2020), 5,665 mg (9/23/2020), 5,665 mg (10/6/2020), 5,665 mg (10/21/2020), 5,665 mg (11/4/2020)  leucovorin calcium 900 mg in dextrose 5 % 250 mL infusion, 945 mg, Intravenous, Clinic/\[facility] [place] [facility]\"" 1 time, 14 of 14 cycles  Administration: 900 mg (5/6/2020), 900 mg (5/20/2020), 900 mg (6/3/2020), 900 mg (6/17/2020), 945 mg (6/30/2020), 945 mg (7/20/2020), 945 mg  (7/29/2020), 945 mg (8/12/2020), 945 mg (8/26/2020), 945 mg (9/9/2020), 945 mg (9/23/2020), 945 mg (10/6/2020), 945 mg (10/21/2020), 945 mg (11/4/2020)  oxaliplatin (ELOXATIN) 200 mg in dextrose 5 % 500 mL chemo infusion, 201 mg, Intravenous, Clinic/HOD 1 time, 6 of 6 cycles  Dose modification: 65 mg/m2 (original dose 85 mg/m2, Cycle 6)  Administration: 200 mg (5/6/2020), 200 mg (5/20/2020), 200 mg (6/3/2020), 200 mg (6/17/2020), 201 mg (6/30/2020), 150 mg (7/20/2020)       6/16/2021 -  Chemotherapy    Treatment Summary   Plan Name: OP COLORECTAL FOLFIRI + BEVACIZUMAB Q2W  Treatment Goal: Control  Status: Active  Start Date: 6/16/2021  End Date: 1/3/2023 (Planned)  Provider: Marah Santo MD  Chemotherapy: fluorouraciL injection 990 mg, 400 mg/m2 = 990 mg, Intravenous, Clinic/HOD 1 time, 15 of 15 cycles  Administration: 990 mg (6/16/2021), 990 mg (6/30/2021), 990 mg (7/14/2021), 980 mg (7/28/2021), 990 mg (8/11/2021), 990 mg (8/25/2021), 990 mg (9/8/2021), 990 mg (9/22/2021), 990 mg (10/6/2021), 990 mg (10/20/2021), 990 mg (11/3/2021), 990 mg (12/1/2021), 990 mg (12/15/2021), 990 mg (11/17/2021), 990 mg (12/28/2021)  fluorouraciL 2,400 mg/m2 = 5,950 mg in sodium chloride 0.9% 240 mL chemo infusion, 2,400 mg/m2 = 5,950 mg, Intravenous, Over 46 hours, 31 of 32 cycles  Administration: 5,950 mg (6/16/2021), 5,950 mg (6/30/2021), 5,950 mg (7/14/2021), 5,880 mg (7/28/2021), 5,930 mg (8/11/2021), 5,930 mg (8/25/2021), 5,930 mg (9/8/2021), 5,930 mg (9/22/2021), 5,930 mg (10/6/2021), 5,930 mg (10/20/2021), 5,930 mg (11/3/2021), 5,930 mg (12/1/2021), 5,930 mg (12/15/2021), 5,930 mg (11/17/2021), 5,930 mg (12/28/2021), 6,050 mg (5/11/2022), 6,025 mg (3/9/2022), 6,025 mg (2/23/2022), 5,930 mg (2/2/2022), 5,930 mg (1/19/2022), 6,050 mg (4/20/2022), 6,025 mg (4/6/2022), 6,025 mg (3/23/2022), 6,050 mg (6/1/2022), 6,050 mg (6/15/2022), 6,050 mg (10/19/2022), 6,050 mg (10/5/2022), 6,050 mg (11/2/2022), 6,050 mg (11/16/2022),  6,050 mg (11/30/2022)  bevacizumab (AVASTIN) 600 mg in sodium chloride 0.9% 100 mL chemo infusion, 660 mg, Intravenous, Clinic/HOD 1 time, 29 of 30 cycles  Dose modification: 5 mg/kg (original dose 5 mg/kg, Cycle 26, Reason: MD Discretion, Comment: 5 mg/kg original dose)  Administration: 600 mg (6/30/2021), 600 mg (7/14/2021), 600 mg (7/28/2021), 600 mg (6/16/2021), 600 mg (8/11/2021), 655 mg (8/25/2021), 600 mg (9/8/2021), 600 mg (9/22/2021), 600 mg (10/6/2021), 600 mg (10/20/2021), 600 mg (11/3/2021), 655 mg (12/1/2021), 600 mg (12/15/2021), 600 mg (11/17/2021), 600 mg (12/28/2021), 600 mg (5/11/2022), 600 mg (3/9/2022), 600 mg (2/23/2022), 600 mg (2/2/2022), 600 mg (1/19/2022), 600 mg (4/20/2022), 680 mg (4/6/2022), 600 mg (3/23/2022), 680 mg (10/5/2022), 680 mg (10/19/2022), 680 mg (11/2/2022), 680 mg (11/16/2022), 680 mg (11/30/2022)  irinotecan (CAMPTOSAR) 440 mg in sodium chloride 0.9% 500 mL chemo infusion, 446 mg, Intravenous, Clinic/HOD 1 time, 31 of 32 cycles  Dose modification: 180 mg/m2 (original dose 180 mg/m2, Cycle 24)  Administration: 440 mg (6/16/2021), 440 mg (6/30/2021), 440 mg (7/14/2021), 440 mg (7/28/2021), 440 mg (8/11/2021), 444 mg (8/25/2021), 440 mg (9/8/2021), 440 mg (9/22/2021), 440 mg (10/6/2021), 440 mg (10/20/2021), 440 mg (11/3/2021), 440 mg (12/1/2021), 440 mg (12/15/2021), 440 mg (11/17/2021), 440 mg (12/28/2021), 440 mg (5/11/2022), 440 mg (3/9/2022), 440 mg (2/23/2022), 440 mg (2/2/2022), 440 mg (1/19/2022), 440 mg (4/20/2022), 440 mg (4/6/2022), 440 mg (3/24/2022), 440 mg (6/1/2022), 440 mg (6/15/2022), 440 mg (10/19/2022), 440 mg (10/5/2022), 440 mg (11/2/2022), 440 mg (11/16/2022), 440 mg (11/30/2022)       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   4/3/2020 Initial Diagnosis    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     5/6/2020 - 11/17/2020 Chemotherapy    Treatment Summary   Plan Name: OP FOLFOX 6 Q2W  Treatment Goal: Curative  Status: Inactive  Start Date: 5/6/2020  End Date:  11/6/2020  Provider: Dylan Leyva MD  Chemotherapy: fluorouraciL injection 945 mg, 400 mg/m2 = 945 mg, Intravenous, Clinic/HOD 1 time, 14 of 14 cycles  Administration: 945 mg (5/6/2020), 945 mg (5/20/2020), 945 mg (6/3/2020), 945 mg (6/17/2020), 945 mg (7/29/2020), 945 mg (8/12/2020), 945 mg (8/26/2020), 945 mg (9/9/2020), 945 mg (9/23/2020), 945 mg (10/6/2020), 945 mg (10/21/2020), 945 mg (11/4/2020)  fluorouraciL 2,400 mg/m2 = 5,665 mg in sodium chloride 0.9% 240 mL chemo infusion, 2,400 mg/m2 = 5,665 mg, Intravenous, Over 46 hours, 14 of 14 cycles  Administration: 5,665 mg (5/6/2020), 5,665 mg (5/20/2020), 5,665 mg (6/3/2020), 5,665 mg (6/17/2020), 5,665 mg (7/29/2020), 5,665 mg (8/12/2020), 5,665 mg (8/26/2020), 5,665 mg (9/9/2020), 5,665 mg (9/23/2020), 5,665 mg (10/6/2020), 5,665 mg (10/21/2020), 5,665 mg (11/4/2020)  leucovorin calcium 900 mg in dextrose 5 % 250 mL infusion, 945 mg, Intravenous, Clinic/HOD 1 time, 14 of 14 cycles  Administration: 900 mg (5/6/2020), 900 mg (5/20/2020), 900 mg (6/3/2020), 900 mg (6/17/2020), 945 mg (6/30/2020), 945 mg (7/20/2020), 945 mg (7/29/2020), 945 mg (8/12/2020), 945 mg (8/26/2020), 945 mg (9/9/2020), 945 mg (9/23/2020), 945 mg (10/6/2020), 945 mg (10/21/2020), 945 mg (11/4/2020)  oxaliplatin (ELOXATIN) 200 mg in dextrose 5 % 500 mL chemo infusion, 201 mg, Intravenous, Clinic/HOD 1 time, 6 of 6 cycles  Dose modification: 65 mg/m2 (original dose 85 mg/m2, Cycle 6)  Administration: 200 mg (5/6/2020), 200 mg (5/20/2020), 200 mg (6/3/2020), 200 mg (6/17/2020), 201 mg (6/30/2020), 150 mg (7/20/2020)       6/16/2021 -  Chemotherapy    Treatment Summary   Plan Name: OP COLORECTAL FOLFIRI + BEVACIZUMAB Q2W  Treatment Goal: Control  Status: Active  Start Date: 6/16/2021  End Date: 1/3/2023 (Planned)  Provider: Marah Santo MD  Chemotherapy: fluorouraciL injection 990 mg, 400 mg/m2 = 990 mg, Intravenous, Clinic/HOD 1 time, 15 of 15 cycles  Administration: 990 mg  (6/16/2021), 990 mg (6/30/2021), 990 mg (7/14/2021), 980 mg (7/28/2021), 990 mg (8/11/2021), 990 mg (8/25/2021), 990 mg (9/8/2021), 990 mg (9/22/2021), 990 mg (10/6/2021), 990 mg (10/20/2021), 990 mg (11/3/2021), 990 mg (12/1/2021), 990 mg (12/15/2021), 990 mg (11/17/2021), 990 mg (12/28/2021)  fluorouraciL 2,400 mg/m2 = 5,950 mg in sodium chloride 0.9% 240 mL chemo infusion, 2,400 mg/m2 = 5,950 mg, Intravenous, Over 46 hours, 31 of 32 cycles  Administration: 5,950 mg (6/16/2021), 5,950 mg (6/30/2021), 5,950 mg (7/14/2021), 5,880 mg (7/28/2021), 5,930 mg (8/11/2021), 5,930 mg (8/25/2021), 5,930 mg (9/8/2021), 5,930 mg (9/22/2021), 5,930 mg (10/6/2021), 5,930 mg (10/20/2021), 5,930 mg (11/3/2021), 5,930 mg (12/1/2021), 5,930 mg (12/15/2021), 5,930 mg (11/17/2021), 5,930 mg (12/28/2021), 6,050 mg (5/11/2022), 6,025 mg (3/9/2022), 6,025 mg (2/23/2022), 5,930 mg (2/2/2022), 5,930 mg (1/19/2022), 6,050 mg (4/20/2022), 6,025 mg (4/6/2022), 6,025 mg (3/23/2022), 6,050 mg (6/1/2022), 6,050 mg (6/15/2022), 6,050 mg (10/19/2022), 6,050 mg (10/5/2022), 6,050 mg (11/2/2022), 6,050 mg (11/16/2022), 6,050 mg (11/30/2022)  bevacizumab (AVASTIN) 600 mg in sodium chloride 0.9% 100 mL chemo infusion, 660 mg, Intravenous, Clinic/[facility] 1 time, 29 of 30 cycles  Dose modification: 5 mg/kg (original dose 5 mg/kg, Cycle 26, Reason: MD Discretion, Comment: 5 mg/kg original dose)  Administration: 600 mg (6/30/2021), 600 mg (7/14/2021), 600 mg (7/28/2021), 600 mg (6/16/2021), 600 mg (8/11/2021), 655 mg (8/25/2021), 600 mg (9/8/2021), 600 mg (9/22/2021), 600 mg (10/6/2021), 600 mg (10/20/2021), 600 mg (11/3/2021), 655 mg (12/1/2021), 600 mg (12/15/2021), 600 mg (11/17/2021), 600 mg (12/28/2021), 600 mg (5/11/2022), 600 mg (3/9/2022), 600 mg (2/23/2022), 600 mg (2/2/2022), 600 mg (1/19/2022), 600 mg (4/20/2022), 680 mg (4/6/2022), 600 mg (3/23/2022), 680 mg (10/5/2022), 680 mg (10/19/2022), 680 mg (11/2/2022), 680 mg (11/16/2022), 680 mg  (2022)  irinotecan (CAMPTOSAR) 440 mg in sodium chloride 0.9% 500 mL chemo infusion, 446 mg, Intravenous, [facility] 1 time, 31 of 32 cycles  Dose modification: 180 mg/m2 (original dose 180 mg/m2, Cycle 24)  Administration: 440 mg (2021), 440 mg (2021), 440 mg (2021), 440 mg (2021), 440 mg (2021), 444 mg (2021), 440 mg (2021), 440 mg (2021), 440 mg (10/6/2021), 440 mg (10/20/2021), 440 mg (11/3/2021), 440 mg (2021), 440 mg (12/15/2021), 440 mg (2021), 440 mg (2021), 440 mg (2022), 440 mg (3/9/2022), 440 mg (2022), 440 mg (2022), 440 mg (2022), 440 mg (2022), 440 mg (2022), 440 mg (3/24/2022), 440 mg (2022), 440 mg (6/15/2022), 440 mg (10/19/2022), 440 mg (10/5/2022), 440 mg (2022), 440 mg (2022), 440 mg (2022)           History:  Past Medical History:   Diagnosis Date    Anxiety     Depression     FH: ovarian cancer 3/16/2020    Hx of psychiatric care     Effexor, Paxil, Lexapro, Zoloft, Wellbutrin, Trazodone Buspar    Hyperthyroidism     Hypothyroid     Kidney calculi     Malignant neoplasm of sigmoid colon 3/16/2020    Menorrhagia     Multinodular goiter 2012    Palpitation     Psychiatric problem     Venous insufficiency       Past Surgical History:   Procedure Laterality Date     SECTION, CLASSIC      x3    COLONOSCOPY N/A 2020    Procedure: COLONOSCOPY;  Surgeon: Shane Parker MD;  Location: [facility] ENDO;  Service: Endoscopy;  Laterality: N/A;    COLONOSCOPY N/A 2022    Procedure: COLONOSCOPY;  Surgeon: Shane Parker MD;  Location: [facility];  Service: Endoscopy;  Laterality: N/A;    CYSTOSCOPY W/ URETERAL STENT PLACEMENT Bilateral 3/25/2020    Procedure: CYSTOSCOPY, WITH URETERAL STENT INSERTION;  Surgeon: Claudio Tyson MD;  Location: [address];  Service: Urology;  Laterality: Bilateral;    INSERTION OF TUNNELED CENTRAL VENOUS CATHETER (CVC) WITH SUBCUTANEOUS  PORT N/A 5/1/2020    Procedure: [id number];  Surgeon: Stephen Diagnostic Provider;  Location: [facility] OR 2ND FLR;  Service: Radiology;  Laterality: N/A;  Room 189/Cindy    LAPAROSCOPIC SIGMOIDECTOMY N/A 3/25/2020    Procedure: COLECTOMY, SIGMOID, LAPAROSCOPIC, flex sig, ERAS high;  Surgeon: Silvio Man MD;  Location: [facility] OR 2ND FLR;  Service: Colon and Rectal;  Laterality: N/A;    MOBILIZATION OF SPLENIC FLEXURE  3/25/2020    Procedure: MOBILIZATION, SPLENIC FLEXURE;  Surgeon: Silvio Man MD;  Location: [facility] OR 2ND FLR;  Service: Colon and Rectal;;    tonsillectomy      TOTAL ABDOMINAL HYSTERECTOMY W/ BILATERAL SALPINGOOPHORECTOMY N/A 3/25/2020    Procedure: HYSTERECTOMY, TOTAL, ABDOMINAL, WITH BILATERAL SALPINGO-OOPHORECTOMY;  Surgeon: Keron Brady MD;  Location: [facility] OR 2ND FLR;  Service: Oncology;  Laterality: N/A;     Family History   Problem Relation Age of Onset    Heart disease Father     Diabetes Mother 65    Drug abuse Brother     Drug abuse Brother     Cancer Maternal Aunt         lung cancer    Cancer Maternal Grandmother         stomach cancer- started in ovaries    Ovarian cancer Maternal Grandmother         stomach cancer- started in ovaries    Colon cancer Paternal Uncle     Cancer Paternal Uncle     Ovarian cancer Maternal Aunt     Ovarian cancer Maternal Aunt     Ovarian cancer Cousin         mother had ovarian cancer    Drug abuse Son     Drug abuse Son         clean/sober since 2012    Colon cancer Son     No Known Problems Daughter     Uterine cancer Neg Hx     Breast cancer Neg Hx      Social History     Tobacco Use    Smoking status: Never    Smokeless tobacco: Never   Substance and Sexual Activity    Alcohol use: Yes     Comment: occasionally- twice monthly    Drug use: Never    Sexual activity: Yes     Partners: Male     Birth control/protection: See Surgical Hx        ROS:   Review of Systems   Constitutional:  Positive for malaise/fatigue (first few days).  "Negative for fever and weight loss.   HENT:  Negative for congestion, hearing loss, nosebleeds and sore throat.    Eyes:  Negative for double vision and photophobia.   Respiratory:  Negative for cough, hemoptysis, sputum production, shortness of breath and wheezing.    Cardiovascular:  Negative for chest pain, palpitations, orthopnea and leg swelling.   Gastrointestinal:  Positive for nausea (improving). Negative for abdominal pain, blood in stool, constipation, diarrhea, heartburn and vomiting.   Genitourinary:  Negative for dysuria, frequency, hematuria and urgency.   Musculoskeletal:  Negative for back pain, joint pain and myalgias.   Skin:  Negative for itching and rash.   Neurological:  Negative for dizziness, tingling, seizures and weakness.   Endo/Heme/Allergies:  Negative for polydipsia. Does not bruise/bleed easily.   Psychiatric/Behavioral:  Negative for depression and memory loss. The patient is nervous/anxious. The patient does not have insomnia.       Objective:     Vitals:    12/19/22 1001   BP: 112/72   Pulse: 95   Resp: 18   Temp: 97.3 °F (36.3 °C)   TempSrc: Temporal   SpO2: 97%   Weight: 135.3 kg (298 lb 4.5 oz)   Height: 5' 6" (1.676 m)   PainSc: 0-No pain             Wt Readings from Last 10 Encounters:   12/19/22 135.3 kg (298 lb 4.5 oz)   12/19/22 135.3 kg (298 lb 4.5 oz)   12/12/22 134.6 kg (296 lb 11.8 oz)   12/02/22 133.8 kg (294 lb 15.6 oz)   11/30/22 133.8 kg (294 lb 15.6 oz)   11/29/22 133.8 kg (294 lb 15.6 oz)   11/18/22 134 kg (295 lb 6.7 oz)   11/16/22 134 kg (295 lb 6.7 oz)   11/14/22 133.7 kg (294 lb 12.1 oz)   11/04/22 133 kg (293 lb 3.4 oz)       Physical Examination:   Physical Exam  Vitals and nursing note reviewed.   Constitutional:       General: She is not in acute distress.     Appearance: She is not diaphoretic.   HENT:      Head: Normocephalic.      Mouth/Throat:      Pharynx: No oropharyngeal exudate.   Eyes:      General: No scleral icterus.     Conjunctiva/sclera: " Conjunctivae normal.   Neck:      Thyroid: No thyromegaly.   Cardiovascular:      Rate and Rhythm: Normal rate and regular rhythm.      Heart sounds: Normal heart sounds. No murmur heard.  Pulmonary:      Effort: Pulmonary effort is normal. No respiratory distress.      Breath sounds: No stridor. No wheezing or rales.   Chest:      Chest wall: No tenderness.   Abdominal:      General: Bowel sounds are normal. There is no distension.      Palpations: Abdomen is soft. There is no mass.      Tenderness: There is no abdominal tenderness. There is no rebound.   Musculoskeletal:         General: No tenderness or deformity. Normal range of motion.      Cervical back: Neck supple.   Lymphadenopathy:      Cervical: No cervical adenopathy.   Skin:     General: Skin is warm and dry.      Findings: No erythema or rash.   Neurological:      Mental Status: She is alert and oriented to person, place, and time.      Cranial Nerves: No cranial nerve deficit.      Coordination: Coordination normal.      Gait: Gait is intact.   Psychiatric:         Mood and Affect: Affect normal.         Cognition and Memory: Memory normal.         Judgment: Judgment normal.        Diagnostic Tests:  Significant Imaging: I have reviewed and interpreted all pertinent imaging results/findings.  Laboratory Data:  All pertinent labs have been reviewed.  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 Oxaliplatin was stopped due to severe adverse reaction.     Follow-up CTs and PET in May 2021 showed enlarging retroperitoneal node, concerning for metastatic disease.      She started FOLFIRI + Avastin and has continued till July 2022.   Given isolated RP toni disease, she has undergone open Left periaortic and aortocaval lymph node dissection on 7/12/2022.     Follow up scans show disease progression. I have discussed the case with Dr. Montelongo at [facility]. We discussed resuming FOLFIRI-debo vs transitioning to BRAF directed therapy (BEACON).   There may be an option ton enroll in SWOG 2107 (encorafenib/cetuximab +/- nivo) at [facility] if no prior BRAF inhibitor exposure. I have recently communicated with him and [facility] is working with the patient's insurance to see if she can be enrolled there.      Patient is interested in the trial and therefore we resumed FOLFIRI- Debo. Continue current therapy until scans can be compared as to not have further delays per patient preference.      If she has progressive disease and unable to got to [facility] and trial not available locally, will treat with next line treatment with encorafenib and cetuximab.     Nausea   Improving on compazine.     Mucositis   Continue [facility] soln.     Transaminitis  Fluctuates.  Continue to monitor. No obvious liver mets.     Hypercalcemia   Mild, secondary to hyperparathyroidism.  Avoid calcium supplements.     Hypothyroidism  Multinodular goiter   Continue Levothyroxine. Endocrine consult is appreciated.   Had a non diagnostic biopsy in 2012, usg findings have remained stable. However, given uptake on PET this year, an FNA vs repeat usg in 6 months is recommended. Will follow closely.     Essential HTN   Continue antihypertensives.      Anxiety   Continue to  follow up with Dr. Palm.     MDM includes  :    - Acute or chronic illness or injury that poses a threat to life  or bodily function  - Independent review and explanation of 3+ results from unique tests  - Discussion of management and ordering 3+ unique tests  - Extensive discussion of treatment and management  - Prescription drug management  - Drug therapy requiring intensive monitoring for toxicity          ECOG SCORE    1 - Restricted in strenuous activity-ambulatory and able to carry out work of a light nature           Discussion:   No follow-ups on file.    Plan was discussed with the patient at length, and she verbalized understanding. Gail was given an opportunity to ask questions that were answered to her satisfaction, and she was advised to call in the interval if any problems or questions arise.    Electronically signed by Marah Santo MD        Route Chart for Scheduling  Med Onc Route Chart for Scheduling    Treatment Plan Information   OP COLORECTAL FOLFIRI + BEVACIZUMAB Q2W   Marah Santo MD   Upcoming Treatment Dates - OP COLORECTAL FOLFIRI + BEVACIZUMAB Q2W    1/1/2023       Pre-Medications       LORazepam injection 1 mg       promethazine (PHENERGAN) 6.25 mg in dextrose 5 % 100 mL IVPB       palonosetron 0.25mg/dexAMETHasone 20mg in NS IVPB       Chemotherapy       bevacizumab (AVASTIN) 5 mg/kg = 680 mg in sodium chloride 0.9% 100 mL chemo infusion       irinotecan (CAMPTOSAR) 440 mg in sodium chloride 0.9% 522 mL chemo infusion       leucovorin calcium 400 mg/m2 = 1,010 mg in dextrose 5 % 250 mL infusion       fluorouracil (ADRUCIL) 2,400 mg/m2 = 6,050 mg in sodium chloride 0.9% 240 mL chemo infusion       Supportive Care       atropine injection 0.4 mg    Supportive Plan Information  IV FLUIDS AND ELECTROLYTES   Brayden Solo MD   Upcoming Treatment Dates - IV FLUIDS AND ELECTROLYTES    No upcoming days in selected categories.    Therapy Plan Information  PORT FLUSH  Flushes  heparin, porcine (PF) 100 unit/mL injection flush 500 Units  500 Units, Intravenous, Every visit  sodium chloride 0.9%  flush 10 mL  10 mL, Intravenous, Every visit

## 2022-12-19 NOTE — PROGRESS NOTES
Oncology Nutrition   Chemotherapy Infusion Visit    Nutrition Follow Up   RD met with patient at chairside during infusion treatment. Pt reports continues to do well nutritionally- eating without difficulty, maintaining weight, and denies nutrition related side effects.    Pt eligible for TFP however denies need today.     Wt Readings from Last 10 Encounters:   12/19/22 135.3 kg (298 lb 4.5 oz)   12/19/22 135.3 kg (298 lb 4.5 oz)   12/12/22 134.6 kg (296 lb 11.8 oz)   12/02/22 133.8 kg (294 lb 15.6 oz)   11/30/22 133.8 kg (294 lb 15.6 oz)   11/29/22 133.8 kg (294 lb 15.6 oz)   11/18/22 134 kg (295 lb 6.7 oz)   11/16/22 134 kg (295 lb 6.7 oz)   11/14/22 133.7 kg (294 lb 12.1 oz)   11/04/22 133 kg (293 lb 3.4 oz)       All other nutrition questions/concerns addressed as appropriate. Will continue to follow and monitor throughout treatment PRN.     Raquel Bullock, MS, RD, LDN  12/19/2022  11:37 AM

## 2022-12-19 NOTE — PLAN OF CARE
Problem: Adult Inpatient Plan of Care  Goal: Plan of Care Review  Outcome: Ongoing, Progressing  Flowsheets (Taken 12/19/2022 1442)  Plan of Care Reviewed With: patient  Goal: Patient-Specific Goal (Individualized)  Outcome: Ongoing, Progressing  Flowsheets (Taken 12/19/2022 1442)  Anxieties, Fears or Concerns: getting scans read, delay of treatment  Individualized Care Needs: recliner, blanket, TV, snack  Patient-Specific Goals (Include Timeframe): no s/s of rx during tx     Problem: Fatigue  Goal: Improved Activity Tolerance  Outcome: Ongoing, Progressing  Intervention: Promote Improved Energy  Flowsheets (Taken 12/19/2022 1442)  Sleep/Rest Enhancement: awakenings minimized  Activity Management:   Ambulated to bathroom - L4   Ambulated in reyes - L4   Up in chair - L3   Pt tolerated Folfiri/Avastin well. No adverse reaction noted. Pt education reinforced on chemo regimen, side effects, what to expect, and when to call physician. Pt verbalized understanding. I reviewed pt calendar w/ pt and understanding verbalized. PAC remains accessed with 5FU infusing at 5.2cc/hr over 46 hours. Patent upon discharge.

## 2022-12-21 ENCOUNTER — INFUSION (OUTPATIENT)
Dept: INFUSION THERAPY | Facility: HOSPITAL | Age: 52
End: 2022-12-21
Attending: INTERNAL MEDICINE
Payer: MEDICAID

## 2022-12-21 VITALS
HEIGHT: 66 IN | SYSTOLIC BLOOD PRESSURE: 120 MMHG | WEIGHT: 293 LBS | DIASTOLIC BLOOD PRESSURE: 69 MMHG | RESPIRATION RATE: 16 BRPM | OXYGEN SATURATION: 97 % | TEMPERATURE: 98 F | BODY MASS INDEX: 47.09 KG/M2 | HEART RATE: 82 BPM

## 2022-12-21 DIAGNOSIS — C18.7 MALIGNANT NEOPLASM OF SIGMOID COLON: ICD-10-CM

## 2022-12-21 DIAGNOSIS — C77.2 METASTASIS TO INTESTINAL LYMPH NODE: Primary | ICD-10-CM

## 2022-12-21 PROCEDURE — 96360 HYDRATION IV INFUSION INIT: CPT | Mod: PN

## 2022-12-21 PROCEDURE — 63600175 PHARM REV CODE 636 W HCPCS: Mod: PN | Performed by: INTERNAL MEDICINE

## 2022-12-21 PROCEDURE — A4216 STERILE WATER/SALINE, 10 ML: HCPCS | Mod: PN | Performed by: INTERNAL MEDICINE

## 2022-12-21 PROCEDURE — 25000003 PHARM REV CODE 250: Mod: PN | Performed by: INTERNAL MEDICINE

## 2022-12-21 RX ORDER — HEPARIN 100 UNIT/ML
500 SYRINGE INTRAVENOUS
Status: DISCONTINUED | OUTPATIENT
Start: 2022-12-21 | End: 2022-12-21 | Stop reason: HOSPADM

## 2022-12-21 RX ORDER — SODIUM CHLORIDE 9 MG/ML
1000 INJECTION, SOLUTION INTRAVENOUS
Status: DISCONTINUED | OUTPATIENT
Start: 2022-12-21 | End: 2022-12-21 | Stop reason: HOSPADM

## 2022-12-21 RX ORDER — SODIUM CHLORIDE 0.9 % (FLUSH) 0.9 %
10 SYRINGE (ML) INJECTION
Status: DISCONTINUED | OUTPATIENT
Start: 2022-12-21 | End: 2022-12-21 | Stop reason: HOSPADM

## 2022-12-21 RX ADMIN — Medication 10 ML: at 01:12

## 2022-12-21 RX ADMIN — Medication 500 UNITS: at 02:12

## 2022-12-21 RX ADMIN — SODIUM CHLORIDE 1000 ML: 9 INJECTION, SOLUTION INTRAVENOUS at 01:12

## 2022-12-21 NOTE — PLAN OF CARE
Problem: Adult Inpatient Plan of Care  Goal: Plan of Care Review  12/21/2022 1516 by Raquel Pitt RN  Outcome: Ongoing, Progressing  Flowsheets (Taken 12/21/2022 1516)  Plan of Care Reviewed With: patient  12/21/2022 1458 by Raquel Pitt RN  Outcome: Ongoing, Progressing  Flowsheets (Taken 12/21/2022 1441)  Plan of Care Reviewed With: patient  Goal: Patient-Specific Goal (Individualized)  12/21/2022 1516 by Raquel Pitt RN  Outcome: Ongoing, Progressing  Flowsheets (Taken 12/21/2022 1516)  Anxieties, Fears or Concerns: stressed about recent scan results  Individualized Care Needs: recliner, lights dimmed, conversation  Patient-Specific Goals (Include Timeframe): none noted  12/21/2022 1458 by Raquel Pitt RN  Outcome: Ongoing, Progressing  Goal: Absence of Hospital-Acquired Illness or Injury  12/21/2022 1516 by Raquel Pitt RN  Outcome: Ongoing, Progressing  12/21/2022 1458 by Raquel Pitt RN  Outcome: Ongoing, Progressing  Goal: Optimal Comfort and Wellbeing  12/21/2022 1516 by Raquel Pitt RN  Outcome: Ongoing, Progressing  12/21/2022 1458 by Raquel Pitt RN  Outcome: Ongoing, Progressing  Intervention: Monitor Pain and Promote Comfort  Flowsheets (Taken 12/21/2022 1516)  Pain Management Interventions:   quiet environment facilitated   relaxation techniques promoted  Intervention: Provide Person-Centered Care  Flowsheets (Taken 12/21/2022 1516)  Trust Relationship/Rapport:   care explained   questions encouraged   choices provided   reassurance provided   emotional support provided   thoughts/feelings acknowledged   empathic listening provided   questions answered  Goal: Readiness for Transition of Care  12/21/2022 1516 by Raquel Pitt RN  Outcome: Ongoing, Progressing  12/21/2022 1458 by Raquel Pitt RN  Outcome: Ongoing, Progressing     Problem: Fatigue  Goal: Improved Activity Tolerance  12/21/2022 1516 by Raquel Pitt RN  Outcome: Ongoing, Progressing  12/21/2022  1458 by Raquel Pitt RN  Outcome: Ongoing, Progressing  Intervention: Promote Improved Energy  Flowsheets (Taken 12/21/2022 1516)  Sleep/Rest Enhancement:   relaxation techniques promoted   noise level reduced   room darkened   therapeutic touch utilized   regular sleep/rest pattern promoted   Pt arrived to clinic today for IVF and Pump d/c and tolerated well. No changes throughout therapy. Pt aware of follow up appointments and side effects of drugs. Discharged to home. NAD.

## 2022-12-26 ENCOUNTER — PATIENT MESSAGE (OUTPATIENT)
Dept: PSYCHIATRY | Facility: CLINIC | Age: 52
End: 2022-12-26
Payer: MEDICAID

## 2023-01-01 ENCOUNTER — PATIENT MESSAGE (OUTPATIENT)
Dept: PSYCHIATRY | Facility: CLINIC | Age: 53
End: 2023-01-01
Payer: MEDICAID

## 2023-01-04 ENCOUNTER — INFUSION (OUTPATIENT)
Dept: INFUSION THERAPY | Facility: HOSPITAL | Age: 53
End: 2023-01-04
Attending: INTERNAL MEDICINE
Payer: MEDICAID

## 2023-01-04 ENCOUNTER — OFFICE VISIT (OUTPATIENT)
Dept: HEMATOLOGY/ONCOLOGY | Facility: CLINIC | Age: 53
End: 2023-01-04
Payer: MEDICAID

## 2023-01-04 VITALS
OXYGEN SATURATION: 98 % | WEIGHT: 293 LBS | SYSTOLIC BLOOD PRESSURE: 108 MMHG | HEIGHT: 66 IN | BODY MASS INDEX: 47.09 KG/M2 | DIASTOLIC BLOOD PRESSURE: 60 MMHG | HEART RATE: 96 BPM | TEMPERATURE: 97 F | RESPIRATION RATE: 16 BRPM

## 2023-01-04 VITALS
HEIGHT: 66 IN | SYSTOLIC BLOOD PRESSURE: 122 MMHG | BODY MASS INDEX: 47.09 KG/M2 | DIASTOLIC BLOOD PRESSURE: 74 MMHG | WEIGHT: 293 LBS | TEMPERATURE: 97 F | RESPIRATION RATE: 16 BRPM | HEART RATE: 87 BPM

## 2023-01-04 DIAGNOSIS — C77.2 METASTASIS TO INTESTINAL LYMPH NODE: Primary | ICD-10-CM

## 2023-01-04 DIAGNOSIS — C18.7 MALIGNANT NEOPLASM OF SIGMOID COLON: ICD-10-CM

## 2023-01-04 DIAGNOSIS — C18.7 MALIGNANT NEOPLASM OF SIGMOID COLON: Primary | ICD-10-CM

## 2023-01-04 DIAGNOSIS — C77.2 METASTASIS TO INTESTINAL LYMPH NODE: ICD-10-CM

## 2023-01-04 PROCEDURE — 3074F PR MOST RECENT SYSTOLIC BLOOD PRESSURE < 130 MM HG: ICD-10-PCS | Mod: CPTII,,, | Performed by: NURSE PRACTITIONER

## 2023-01-04 PROCEDURE — 3008F PR BODY MASS INDEX (BMI) DOCUMENTED: ICD-10-PCS | Mod: CPTII,,, | Performed by: NURSE PRACTITIONER

## 2023-01-04 PROCEDURE — 99999 PR PBB SHADOW E&M-EST. PATIENT-LVL IV: CPT | Mod: PBBFAC,,, | Performed by: NURSE PRACTITIONER

## 2023-01-04 PROCEDURE — 96417 CHEMO IV INFUS EACH ADDL SEQ: CPT | Mod: PN

## 2023-01-04 PROCEDURE — 3078F DIAST BP <80 MM HG: CPT | Mod: CPTII,,, | Performed by: NURSE PRACTITIONER

## 2023-01-04 PROCEDURE — 25000003 PHARM REV CODE 250: Mod: PN | Performed by: NURSE PRACTITIONER

## 2023-01-04 PROCEDURE — 96375 TX/PRO/DX INJ NEW DRUG ADDON: CPT | Mod: PN

## 2023-01-04 PROCEDURE — 99214 PR OFFICE/OUTPT VISIT, EST, LEVL IV, 30-39 MIN: ICD-10-PCS | Mod: S$PBB,,, | Performed by: NURSE PRACTITIONER

## 2023-01-04 PROCEDURE — 99214 OFFICE O/P EST MOD 30 MIN: CPT | Mod: S$PBB,,, | Performed by: NURSE PRACTITIONER

## 2023-01-04 PROCEDURE — 99999 PR PBB SHADOW E&M-EST. PATIENT-LVL IV: ICD-10-PCS | Mod: PBBFAC,,, | Performed by: NURSE PRACTITIONER

## 2023-01-04 PROCEDURE — A4216 STERILE WATER/SALINE, 10 ML: HCPCS | Mod: PN | Performed by: NURSE PRACTITIONER

## 2023-01-04 PROCEDURE — 3078F PR MOST RECENT DIASTOLIC BLOOD PRESSURE < 80 MM HG: ICD-10-PCS | Mod: CPTII,,, | Performed by: NURSE PRACTITIONER

## 2023-01-04 PROCEDURE — 3008F BODY MASS INDEX DOCD: CPT | Mod: CPTII,,, | Performed by: NURSE PRACTITIONER

## 2023-01-04 PROCEDURE — 96416 CHEMO PROLONG INFUSE W/PUMP: CPT | Mod: PN

## 2023-01-04 PROCEDURE — 3074F SYST BP LT 130 MM HG: CPT | Mod: CPTII,,, | Performed by: NURSE PRACTITIONER

## 2023-01-04 PROCEDURE — 99214 OFFICE O/P EST MOD 30 MIN: CPT | Mod: PBBFAC,PN,25 | Performed by: NURSE PRACTITIONER

## 2023-01-04 PROCEDURE — 63600175 PHARM REV CODE 636 W HCPCS: Mod: PN | Performed by: NURSE PRACTITIONER

## 2023-01-04 PROCEDURE — 96413 CHEMO IV INFUSION 1 HR: CPT | Mod: PN

## 2023-01-04 PROCEDURE — 96367 TX/PROPH/DG ADDL SEQ IV INF: CPT | Mod: PN

## 2023-01-04 PROCEDURE — 96368 THER/DIAG CONCURRENT INF: CPT | Mod: PN

## 2023-01-04 RX ORDER — LORAZEPAM 2 MG/ML
1 INJECTION INTRAMUSCULAR
Status: COMPLETED | OUTPATIENT
Start: 2023-01-04 | End: 2023-01-04

## 2023-01-04 RX ORDER — ATROPINE SULFATE 0.4 MG/ML
0.4 INJECTION, SOLUTION ENDOTRACHEAL; INTRAMEDULLARY; INTRAMUSCULAR; INTRAVENOUS; SUBCUTANEOUS ONCE AS NEEDED
Status: COMPLETED | OUTPATIENT
Start: 2023-01-04 | End: 2023-01-04

## 2023-01-04 RX ORDER — SODIUM CHLORIDE 0.9 % (FLUSH) 0.9 %
10 SYRINGE (ML) INJECTION
Status: DISCONTINUED | OUTPATIENT
Start: 2023-01-04 | End: 2023-01-04 | Stop reason: HOSPADM

## 2023-01-04 RX ORDER — HEPARIN 100 UNIT/ML
500 SYRINGE INTRAVENOUS
Status: CANCELLED | OUTPATIENT
Start: 2023-01-06

## 2023-01-04 RX ORDER — HEPARIN 100 UNIT/ML
500 SYRINGE INTRAVENOUS
Status: CANCELLED | OUTPATIENT
Start: 2023-01-04

## 2023-01-04 RX ORDER — PROCHLORPERAZINE MALEATE 10 MG
10 TABLET ORAL EVERY 6 HOURS PRN
Qty: 30 TABLET | Refills: 6 | Status: SHIPPED | OUTPATIENT
Start: 2023-01-04 | End: 2024-01-04

## 2023-01-04 RX ORDER — SODIUM CHLORIDE 0.9 % (FLUSH) 0.9 %
10 SYRINGE (ML) INJECTION
Status: CANCELLED | OUTPATIENT
Start: 2023-01-04

## 2023-01-04 RX ORDER — LORAZEPAM 2 MG/ML
1 INJECTION INTRAMUSCULAR
Status: CANCELLED | OUTPATIENT
Start: 2023-01-04 | End: 2023-01-04

## 2023-01-04 RX ORDER — SODIUM CHLORIDE 0.9 % (FLUSH) 0.9 %
10 SYRINGE (ML) INJECTION
Status: CANCELLED | OUTPATIENT
Start: 2023-01-06

## 2023-01-04 RX ORDER — SODIUM CHLORIDE 9 MG/ML
1000 INJECTION, SOLUTION INTRAVENOUS
Status: CANCELLED | OUTPATIENT
Start: 2023-01-06

## 2023-01-04 RX ORDER — ATROPINE SULFATE 0.4 MG/ML
0.4 INJECTION, SOLUTION ENDOTRACHEAL; INTRAMEDULLARY; INTRAMUSCULAR; INTRAVENOUS; SUBCUTANEOUS ONCE AS NEEDED
Status: CANCELLED | OUTPATIENT
Start: 2023-01-04

## 2023-01-04 RX ORDER — HEPARIN 100 UNIT/ML
500 SYRINGE INTRAVENOUS
Status: DISCONTINUED | OUTPATIENT
Start: 2023-01-04 | End: 2023-01-04 | Stop reason: HOSPADM

## 2023-01-04 RX ADMIN — Medication 10 ML: at 03:01

## 2023-01-04 RX ADMIN — SODIUM CHLORIDE 440 MG: 0.9 INJECTION, SOLUTION INTRAVENOUS at 01:01

## 2023-01-04 RX ADMIN — BEVACIZUMAB 680 MG: 400 INJECTION, SOLUTION INTRAVENOUS at 01:01

## 2023-01-04 RX ADMIN — LEUCOVORIN CALCIUM 1010 MG: 350 INJECTION, POWDER, LYOPHILIZED, FOR SUSPENSION INTRAMUSCULAR; INTRAVENOUS at 01:01

## 2023-01-04 RX ADMIN — ATROPINE SULFATE 0.4 MG: 0.4 INJECTION, SOLUTION INTRAVENOUS at 01:01

## 2023-01-04 RX ADMIN — LORAZEPAM 1 MG: 2 INJECTION INTRAMUSCULAR; INTRAVENOUS at 12:01

## 2023-01-04 RX ADMIN — SODIUM CHLORIDE: 9 INJECTION, SOLUTION INTRAVENOUS at 12:01

## 2023-01-04 RX ADMIN — PROMETHAZINE HYDROCHLORIDE 6.25 MG: 25 INJECTION INTRAMUSCULAR; INTRAVENOUS at 12:01

## 2023-01-04 RX ADMIN — PALONOSETRON 0.25 MG: 0.05 INJECTION, SOLUTION INTRAVENOUS at 12:01

## 2023-01-04 RX ADMIN — FLUOROURACIL 6050 MG: 50 INJECTION, SOLUTION INTRAVENOUS at 03:01

## 2023-01-04 NOTE — PLAN OF CARE
Problem: Adult Inpatient Plan of Care  Goal: Patient-Specific Goal (Individualized)  Outcome: Ongoing, Progressing  Flowsheets (Taken 1/4/2023 1600)  Anxieties, Fears or Concerns: None  Individualized Care Needs: Recwillem reyes     Problem: Fatigue  Goal: Improved Activity Tolerance  Intervention: Promote Improved Energy  Flowsheets (Taken 1/4/2023 1600)  Fatigue Management:   activity schedule adjusted   frequent rest breaks encouraged   paced activity encouraged   fatigue-related activity identified  Sleep/Rest Enhancement:   regular sleep/rest pattern promoted   relaxation techniques promoted  Activity Management:   Ambulated -L4   Ambulated in reyes - L4     Problem: Adult Inpatient Plan of Care  Goal: Plan of Care Review  Outcome: Ongoing, Progressing  Flowsheets (Taken 1/4/2023 1600)  Plan of Care Reviewed With: patient  Tolerated treatment with no known distress.  Ambulated from infusion center with steady gait.

## 2023-01-04 NOTE — PROGRESS NOTES
Subjective:      Name: Gail Mckinnon  : 1970  MRN: [id number]    CC:  Clearance prior to chemotherapy    HPI:   Gail Mckinnon is a 52 y.o. female presents for evaluation for next chemotherapy tx for a diagnosis of Colon cancer, initially Stage III & now with LN recurrence.    Onc history:  She completed adjuvant FOLFOX in 2020.   She tolerated only 4 cycles of FOLFOX before she developed an infusion reaction to oxaliplatin in cycle 5 was well as cycle 6. She then completed 6 cycles of infusional 5 FU.     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 She was offered second line therapy with FOLFIRI and Avastin.      She presented to the ED on  with left flank pain. CT renal stone study showed Moderate hydronephrosis on the left secondary to 3 mm calculus at the UPJ.     She had a PET scan mid April that showed possible progression of her disease.     She has been to [facility] for another opinion. No change was recommended in systemic therapy but she was offered surgical removal of the aorta caval nodes.  Recent sans at [facility]  show stable disease.       Surgery done 22. Received 2 more cycle of FOLFIRI without Avastin.      She had repeat imaging at [facility] on 22 that unfortunately showed disease progression since her surgery with multiple RP nodes and one mediastinal node.     Interim history:  Today:  23   She presents for a follow up visit prior to next cycle of FOLFIRI + Avastin.  Compazine helping with nausea.  No fevers, abdominal discomfort, N/V/D, bleeding, etc.  Follows with Dr. Palm.  Still waiting to hear from [facility]; insurance change from private to Medicaid due to job loss.      Oncology History   Malignant neoplasm of sigmoid colon   3/16/2020 Initial Diagnosis    Malignant neoplasm of sigmoid colon     3/31/2020 Cancer Staged    Staging form: Colon and Rectum, AJCC 8th Edition  - Clinical stage from 3/31/2020: Stage IIIC (cT4b, cN2a, cM0)      5/6/2020 - 11/17/2020 Chemotherapy    Treatment Summary   Plan Name: OP FOLFOX 6 Q2W  Treatment Goal: Curative  Status: Inactive  Start Date: 5/6/2020  End Date: 11/6/2020  Provider: Dylan Leyva MD  Chemotherapy: fluorouraciL injection 945 mg, 400 mg/m2 = 945 mg, Intravenous, Clinic/[facility] 1 time, 14 of 14 cycles  Administration: 945 mg (5/6/2020), 945 mg (5/20/2020), 945 mg (6/3/2020), 945 mg (6/17/2020), 945 mg (7/29/2020), 945 mg (8/12/2020), 945 mg (8/26/2020), 945 mg (9/9/2020), 945 mg (9/23/2020), 945 mg (10/6/2020), 945 mg (10/21/2020), 945 mg (11/4/2020)  fluorouraciL 2,400 mg/m2 = 5,665 mg in sodium chloride 0.9% 240 mL chemo infusion, 2,400 mg/m2 = 5,665 mg, Intravenous, Over 46 hours, 14 of 14 cycles  Administration: 5,665 mg (5/6/2020), 5,665 mg (5/20/2020), 5,665 mg (6/3/2020), 5,665 mg (6/17/2020), 5,665 mg (7/29/2020), 5,665 mg (8/12/2020), 5,665 mg (8/26/2020), 5,665 mg (9/9/2020), 5,665 mg (9/23/2020), 5,665 mg (10/6/2020), 5,665 mg (10/21/2020), 5,665 mg (11/4/2020)  leucovorin calcium 900 mg in dextrose 5 % 250 mL infusion, 945 mg, Intravenous, Clinic/[facility] 1 time, 14 of 14 cycles  Administration: 900 mg (5/6/2020), 900 mg (5/20/2020), 900 mg (6/3/2020), 900 mg (6/17/2020), 945 mg (6/30/2020), 945 mg (7/20/2020), 945 mg (7/29/2020), 945 mg (8/12/2020), 945 mg (8/26/2020), 945 mg (9/9/2020), 945 mg (9/23/2020), 945 mg (10/6/2020), 945 mg (10/21/2020), 945 mg (11/4/2020)  oxaliplatin (ELOXATIN) 200 mg in dextrose 5 % 500 mL chemo infusion, 201 mg, Intravenous, Clinic/[facility] 1 time, 6 of 6 cycles  Dose modification: 65 mg/m2 (original dose 85 mg/m2, Cycle 6)  Administration: 200 mg (5/6/2020), 200 mg (5/20/2020), 200 mg (6/3/2020), 200 mg (6/17/2020), 201 mg (6/30/2020), 150 mg (7/20/2020)     6/16/2021 -  Chemotherapy    Treatment Summary   Plan Name: OP COLORECTAL FOLFIRI + BEVACIZUMAB Q2W  Treatment Goal: Control  Status: Active  Start Date: 6/16/2021  End Date: 1/6/2023 (Planned)  Provider: Marah  NGUYEN Santo MD  Chemotherapy: fluorouraciL injection 990 mg, 400 mg/m2 = 990 mg, Intravenous, [facility] 1 time, 15 of 15 cycles  Administration: 990 mg (6/16/2021), 990 mg (6/30/2021), 990 mg (7/14/2021), 980 mg (7/28/2021), 990 mg (8/11/2021), 990 mg (8/25/2021), 990 mg (9/8/2021), 990 mg (9/22/2021), 990 mg (10/6/2021), 990 mg (10/20/2021), 990 mg (11/3/2021), 990 mg (12/1/2021), 990 mg (12/15/2021), 990 mg (11/17/2021), 990 mg (12/28/2021)  fluorouraciL 2,400 mg/m2 = 5,950 mg in sodium chloride 0.9% 240 mL chemo infusion, 2,400 mg/m2 = 5,950 mg, Intravenous, Over 46 hours, 31 of 32 cycles  Administration: 5,950 mg (6/16/2021), 5,950 mg (6/30/2021), 5,950 mg (7/14/2021), 5,880 mg (7/28/2021), 5,930 mg (8/11/2021), 5,930 mg (8/25/2021), 5,930 mg (9/8/2021), 5,930 mg (9/22/2021), 5,930 mg (10/6/2021), 5,930 mg (10/20/2021), 5,930 mg (11/3/2021), 5,930 mg (12/1/2021), 5,930 mg (12/15/2021), 5,930 mg (11/17/2021), 5,930 mg (12/28/2021), 6,050 mg (5/11/2022), 6,025 mg (3/9/2022), 6,025 mg (2/23/2022), 5,930 mg (2/2/2022), 5,930 mg (1/19/2022), 6,050 mg (4/20/2022), 6,025 mg (4/6/2022), 6,025 mg (3/23/2022), 6,050 mg (6/1/2022), 6,050 mg (6/15/2022), 6,050 mg (10/19/2022), 6,050 mg (10/5/2022), 6,050 mg (11/2/2022), 6,050 mg (11/16/2022), 6,050 mg (11/30/2022), 6,050 mg (12/19/2022)  bevacizumab (AVASTIN) 600 mg in sodium chloride 0.9% 100 mL chemo infusion, 660 mg, Intravenous, Clinic/[facility] 1 time, 29 of 30 cycles  Dose modification: 5 mg/kg (original dose 5 mg/kg, Cycle 26, Reason: MD Discretion, Comment: 5 mg/kg original dose)  Administration: 600 mg (6/30/2021), 600 mg (7/14/2021), 600 mg (7/28/2021), 600 mg (6/16/2021), 600 mg (8/11/2021), 655 mg (8/25/2021), 600 mg (9/8/2021), 600 mg (9/22/2021), 600 mg (10/6/2021), 600 mg (10/20/2021), 600 mg (11/3/2021), 655 mg (12/1/2021), 600 mg (12/15/2021), 600 mg (11/17/2021), 600 mg (12/28/2021), 600 mg (5/11/2022), 600 mg (3/9/2022), 600 mg (2/23/2022), 600 mg (2/2/2022),  600 mg (1/19/2022), 600 mg (4/20/2022), 680 mg (4/6/2022), 600 mg (3/23/2022), 680 mg (10/5/2022), 680 mg (10/19/2022), 680 mg (11/2/2022), 680 mg (11/16/2022), 680 mg (11/30/2022), 680 mg (12/19/2022)  irinotecan (CAMPTOSAR) 440 mg in sodium chloride 0.9% 500 mL chemo infusion, 446 mg, Intravenous, [facility] time, 31 of 32 cycles  Dose modification: 180 mg/m2 (original dose 180 mg/m2, Cycle 24)  Administration: 440 mg (6/16/2021), 440 mg (6/30/2021), 440 mg (7/14/2021), 440 mg (7/28/2021), 440 mg (8/11/2021), 444 mg (8/25/2021), 440 mg (9/8/2021), 440 mg (9/22/2021), 440 mg (10/6/2021), 440 mg (10/20/2021), 440 mg (11/3/2021), 440 mg (12/1/2021), 440 mg (12/15/2021), 440 mg (11/17/2021), 440 mg (12/28/2021), 440 mg (5/11/2022), 440 mg (3/9/2022), 440 mg (2/23/2022), 440 mg (2/2/2022), 440 mg (1/19/2022), 440 mg (4/20/2022), 440 mg (4/6/2022), 440 mg (3/24/2022), 440 mg (6/1/2022), 440 mg (6/15/2022), 440 mg (10/19/2022), 440 mg (10/5/2022), 440 mg (11/2/2022), 440 mg (11/16/2022), 440 mg (11/30/2022), 440 mg (12/19/2022)     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   4/3/2020 Initial Diagnosis    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     5/6/2020 - 11/17/2020 Chemotherapy    Treatment Summary   Plan Name: OP FOLFOX 6 Q2W  Treatment Goal: Curative  Status: Inactive  Start Date: 5/6/2020  End Date: 11/6/2020  Provider: Dylan Leyva MD  Chemotherapy: fluorouraciL injection 945 mg, 400 mg/m2 = 945 mg, Intravenous, Clinic/[facility] 1 time, 14 of 14 cycles  Administration: 945 mg (5/6/2020), 945 mg (5/20/2020), 945 mg (6/3/2020), 945 mg (6/17/2020), 945 mg (7/29/2020), 945 mg (8/12/2020), 945 mg (8/26/2020), 945 mg (9/9/2020), 945 mg (9/23/2020), 945 mg (10/6/2020), 945 mg (10/21/2020), 945 mg (11/4/2020)  fluorouraciL 2,400 mg/m2 = 5,665 mg in sodium chloride 0.9% 240 mL chemo infusion, 2,400 mg/m2 = 5,665 mg, Intravenous, Over 46 hours, 14 of 14 cycles  Administration: 5,665 mg (5/6/2020), 5,665 mg (5/20/2020), 5,665 mg  (6/3/2020), 5,665 mg (6/17/2020), 5,665 mg (7/29/2020), 5,665 mg (8/12/2020), 5,665 mg (8/26/2020), 5,665 mg (9/9/2020), 5,665 mg (9/23/2020), 5,665 mg (10/6/2020), 5,665 mg (10/21/2020), 5,665 mg (11/4/2020)  leucovorin calcium 900 mg in dextrose 5 % 250 mL infusion, 945 mg, Intravenous, Clinic/[facility] 1 time, 14 of 14 cycles  Administration: 900 mg (5/6/2020), 900 mg (5/20/2020), 900 mg (6/3/2020), 900 mg (6/17/2020), 945 mg (6/30/2020), 945 mg (7/20/2020), 945 mg (7/29/2020), 945 mg (8/12/2020), 945 mg (8/26/2020), 945 mg (9/9/2020), 945 mg (9/23/2020), 945 mg (10/6/2020), 945 mg (10/21/2020), 945 mg (11/4/2020)  oxaliplatin (ELOXATIN) 200 mg in dextrose 5 % 500 mL chemo infusion, 201 mg, Intravenous, Clinic/[facility] 1 time, 6 of 6 cycles  Dose modification: 65 mg/m2 (original dose 85 mg/m2, Cycle 6)  Administration: 200 mg (5/6/2020), 200 mg (5/20/2020), 200 mg (6/3/2020), 200 mg (6/17/2020), 201 mg (6/30/2020), 150 mg (7/20/2020)     6/16/2021 -  Chemotherapy    Treatment Summary   Plan Name: OP COLORECTAL FOLFIRI + BEVACIZUMAB Q2W  Treatment Goal: Control  Status: Active  Start Date: 6/16/2021  End Date: 1/6/2023 (Planned)  Provider: Marah Santo MD  Chemotherapy: fluorouraciL injection 990 mg, 400 mg/m2 = 990 mg, Intravenous, Clinic/[facility] 1 time, 15 of 15 cycles  Administration: 990 mg (6/16/2021), 990 mg (6/30/2021), 990 mg (7/14/2021), 980 mg (7/28/2021), 990 mg (8/11/2021), 990 mg (8/25/2021), 990 mg (9/8/2021), 990 mg (9/22/2021), 990 mg (10/6/2021), 990 mg (10/20/2021), 990 mg (11/3/2021), 990 mg (12/1/2021), 990 mg (12/15/2021), 990 mg (11/17/2021), 990 mg (12/28/2021)  fluorouraciL 2,400 mg/m2 = 5,950 mg in sodium chloride 0.9% 240 mL chemo infusion, 2,400 mg/m2 = 5,950 mg, Intravenous, Over 46 hours, 31 of 32 cycles  Administration: 5,950 mg (6/16/2021), 5,950 mg (6/30/2021), 5,950 mg (7/14/2021), 5,880 mg (7/28/2021), 5,930 mg (8/11/2021), 5,930 mg (8/25/2021), 5,930 mg (9/8/2021), 5,930 mg (9/22/2021),  5,930 mg (10/6/2021), 5,930 mg (10/20/2021), 5,930 mg (11/3/2021), 5,930 mg (12/1/2021), 5,930 mg (12/15/2021), 5,930 mg (11/17/2021), 5,930 mg (12/28/2021), 6,050 mg (5/11/2022), 6,025 mg (3/9/2022), 6,025 mg (2/23/2022), 5,930 mg (2/2/2022), 5,930 mg (1/19/2022), 6,050 mg (4/20/2022), 6,025 mg (4/6/2022), 6,025 mg (3/23/2022), 6,050 mg (6/1/2022), 6,050 mg (6/15/2022), 6,050 mg (10/19/2022), 6,050 mg (10/5/2022), 6,050 mg (11/2/2022), 6,050 mg (11/16/2022), 6,050 mg (11/30/2022), 6,050 mg (12/19/2022)  bevacizumab (AVASTIN) 600 mg in sodium chloride 0.9% 100 mL chemo infusion, 660 mg, Intravenous, [facility] 1 time, 29 of 30 cycles  Dose modification: 5 mg/kg (original dose 5 mg/kg, Cycle 26, Reason: MD Discretion, Comment: 5 mg/kg original dose)  Administration: 600 mg (6/30/2021), 600 mg (7/14/2021), 600 mg (7/28/2021), 600 mg (6/16/2021), 600 mg (8/11/2021), 655 mg (8/25/2021), 600 mg (9/8/2021), 600 mg (9/22/2021), 600 mg (10/6/2021), 600 mg (10/20/2021), 600 mg (11/3/2021), 655 mg (12/1/2021), 600 mg (12/15/2021), 600 mg (11/17/2021), 600 mg (12/28/2021), 600 mg (5/11/2022), 600 mg (3/9/2022), 600 mg (2/23/2022), 600 mg (2/2/2022), 600 mg (1/19/2022), 600 mg (4/20/2022), 680 mg (4/6/2022), 600 mg (3/23/2022), 680 mg (10/5/2022), 680 mg (10/19/2022), 680 mg (11/2/2022), 680 mg (11/16/2022), 680 mg (11/30/2022), 680 mg (12/19/2022)  irinotecan (CAMPTOSAR) 440 mg in sodium chloride 0.9% 500 mL chemo infusion, 446 mg, Intravenous, [facility] [place] 1 time, 31 of 32 cycles  Dose modification: 180 mg/m2 (original dose 180 mg/m2, Cycle 24)  Administration: 440 mg (6/16/2021), 440 mg (6/30/2021), 440 mg (7/14/2021), 440 mg (7/28/2021), 440 mg (8/11/2021), 444 mg (8/25/2021), 440 mg (9/8/2021), 440 mg (9/22/2021), 440 mg (10/6/2021), 440 mg (10/20/2021), 440 mg (11/3/2021), 440 mg (12/1/2021), 440 mg (12/15/2021), 440 mg (11/17/2021), 440 mg (12/28/2021), 440 mg (5/11/2022), 440 mg (3/9/2022), 440 mg (2/23/2022), 440 mg  (2022), 440 mg (2022), 440 mg (2022), 440 mg (2022), 440 mg (3/24/2022), 440 mg (2022), 440 mg (6/15/2022), 440 mg (10/19/2022), 440 mg (10/5/2022), 440 mg (2022), 440 mg (2022), 440 mg (2022), 440 mg (2022)            Past Medical History:   Diagnosis Date    Anxiety     Depression     FH: ovarian cancer 3/16/2020    Hx of psychiatric care     Effexor, Paxil, Lexapro, Zoloft, Wellbutrin, Trazodone Buspar    Hyperthyroidism     Hypothyroid     Kidney calculi     Malignant neoplasm of sigmoid colon 3/16/2020    Menorrhagia     Multinodular goiter 2012    Palpitation     Psychiatric problem     Venous insufficiency        Past Surgical History:   Procedure Laterality Date     SECTION, CLASSIC      x3    COLONOSCOPY N/A 2020    Procedure: COLONOSCOPY;  Surgeon: Shane Parker MD;  Location: [facility] ENDO;  Service: Endoscopy;  Laterality: N/A;    COLONOSCOPY N/A 2022    Procedure: COLONOSCOPY;  Surgeon: Shane Parker MD;  Location: [facility];  Service: Endoscopy;  Laterality: N/A;    CYSTOSCOPY W/ URETERAL STENT PLACEMENT Bilateral 3/25/2020    Procedure: CYSTOSCOPY, WITH URETERAL STENT INSERTION;  Surgeon: Claudio Tyson MD;  Location: [facility] OR [address] [place] [address];  Service: Urology;  Laterality: Bilateral;    INSERTION OF TUNNELED CENTRAL VENOUS CATHETER (CVC) WITH SUBCUTANEOUS PORT N/A 2020    Procedure: WHTRSCTUM-ZAWF-B-CATH;  Surgeon: [facility] Diagnostic Provider;  [facility];  Service: Radiology;  Laterality: N/A;  Room 189/Cindy    LAPAROSCOPIC SIGMOIDECTOMY N/A 3/25/2020    Procedure: COLECTOMY, SIGMOID, LAPAROSCOPIC, flex sig, ERAS high;  Surgeon: Silvio Man MD;  Location: [facility] OR 2ND FLR;  Service: Colon and Rectal;  Laterality: N/A;    MOBILIZATION OF SPLENIC FLEXURE  3/25/2020    Procedure: MOBILIZATION, SPLENIC FLEXURE;  Surgeon: Silvio Man MD;  Location: [facility] OR 2ND FLR;  Service: Colon and Rectal;;     tonsillectomy      TOTAL ABDOMINAL HYSTERECTOMY W/ BILATERAL SALPINGOOPHORECTOMY N/A 3/25/2020    Procedure: HYSTERECTOMY, TOTAL, ABDOMINAL, WITH BILATERAL SALPINGO-OOPHORECTOMY;  Surgeon: Keron Brady MD;  Location: [facility] OR [address] [place] [address];  Service: Oncology;  Laterality: N/A;       Family History   Problem Relation Age of Onset    Heart disease Father     Diabetes Mother 65    Drug abuse Brother     Drug abuse Brother     Cancer Maternal Aunt         lung cancer    Cancer Maternal Grandmother         stomach cancer- started in ovaries    Ovarian cancer Maternal Grandmother         stomach cancer- started in ovaries    Colon cancer Paternal Uncle     Cancer Paternal Uncle     Ovarian cancer Maternal Aunt     Ovarian cancer Maternal Aunt     Ovarian cancer Cousin         mother had ovarian cancer    Drug abuse Son     Drug abuse Son         clean/sober since 2012    Colon cancer Son     No Known Problems Daughter     Uterine cancer Neg Hx     Breast cancer Neg Hx        Social History     Socioeconomic History    Marital status:     Number of children: 3   Tobacco Use    Smoking status: Never    Smokeless tobacco: Never   Substance and Sexual Activity    Alcohol use: Yes     Comment: occasionally- twice monthly    Drug use: Never    Sexual activity: Yes     Partners: Male     Birth control/protection: See Surgical Hx   Social History Narrative        2 adult sons, 1 daughter     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   Cardiovascular:  Negative for chest pain.   Respiratory:  Negative for cough.    Hematologic/Lymphatic: Negative for adenopathy.   Skin:  Negative for rash.   Gastrointestinal:   "Positive for nausea. Negative for abdominal pain and diarrhea.   Genitourinary:  Negative for frequency.   Neurological:  Positive for headaches.   Psychiatric/Behavioral:  The patient is nervous/anxious.    All other systems reviewed and are negative.         Objective:   Weight:  Loss of 1 1/2 pounds in 2 weeks  Vitals:    01/04/23 1037   BP: 108/60   BP Location: Left arm   Patient Position: Sitting   BP Method: Large (Manual)   Pulse: 96   Resp: 16   Temp: 97.3 °F (36.3 °C)   TempSrc: Temporal   SpO2: 98%   Weight: 134.5 kg (296 lb 8.3 oz)   Height: 5' 6" (1.676 m)        Physical Exam  Vitals reviewed.   Constitutional:       General: She is not in acute distress.  HENT:      Head: Normocephalic and atraumatic.      Mouth/Throat:      Pharynx: Oropharynx is clear.   Eyes:      Conjunctiva/sclera: Conjunctivae normal.   Cardiovascular:      Rate and Rhythm: Normal rate and regular rhythm.      Pulses: Normal pulses.      Heart sounds: Normal heart sounds.   Pulmonary:      Effort: Pulmonary effort is normal. No respiratory distress.      Breath sounds: No wheezing.   Abdominal:      General: Bowel sounds are normal.      Palpations: Abdomen is soft.   Musculoskeletal:      Cervical back: Neck supple.      Right lower leg: No edema.      Left lower leg: No edema.   Lymphadenopathy:      Cervical: No cervical adenopathy.      Upper Body:      Right upper body: No supraclavicular or axillary adenopathy.      Left upper body: No supraclavicular or axillary adenopathy.   Skin:     General: Skin is warm and dry.      Capillary Refill: Capillary refill takes less than 2 seconds.      Comments: Port a cath to [facility] without erythema   Neurological:      Mental Status: She is alert and oriented to person, place, and time.   Psychiatric:         Behavior: Behavior normal.         Thought Content: Thought content normal.           Current Outpatient Medications on File Prior to Visit   Medication Sig    acetaminophen " (TYLENOL) 500 MG tablet Take 2 tablets (1,000 mg total) by mouth every 8 (eight) hours.    buPROPion (WELLBUTRIN SR) 150 MG TBSR 12 hr tablet Take 1 tablet (150 mg total) by mouth 2 (two) times daily.    busPIRone (BUSPAR) 10 MG tablet Take 1 tablet (10 mg total) by mouth 2 (two) times daily.    granisetron (SANCUSO) 3.1 mg/24 hour Place 1 patch (3.1 mg total) onto the skin every 7 days AS DIRECTED.    Lactobacillus rhamnosus GG (CULTURELLE) 10 billion cell capsule Take 1 capsule by mouth once daily.    lactulose (CHRONULAC) 10 gram/15 mL solution Take 30 mLs (20 g total) by mouth daily as needed (constipation).    levothyroxine (SYNTHROID) 200 MCG tablet Take 1 tablet (200 mcg total) by mouth once daily.    LIDOcaine-prilocaine (EMLA) cream Apply topically as needed (30 to 60 min prior chemo or port use).    LORazepam (ATIVAN) 1 MG tablet Take 1 tablet (1 mg total) by mouth every 12 (twelve) hours as needed for Anxiety (nausea).    magic mouthwash diphen/antac/lidoc/nysta Swish and swallow 5 mL every 4 hours as needed for mouth pain/ulcers    multivitamin (THERAGRAN) per tablet Take 1 tablet by mouth once daily.    olmesartan (BENICAR) 20 MG tablet Take 1 tablet (20 mg total) by mouth once daily.    ondansetron (ZOFRAN-ODT) 8 MG TbDL Take 1 tablet (8 mg total) by mouth every 8 (eight) hours as needed.    prochlorperazine (COMPAZINE) 10 MG tablet Take 1 tablet (10 mg total) by mouth every 6 (six) hours as needed.    promethazine (PHENERGAN) 12.5 MG Tab Take 1 to 2 tablets by mouth every 6 hours as needed for nausea persistent despite zofran    senna-docusate 8.6-50 mg (PERICOLACE) 8.6-50 mg per tablet Take 2 tablets by mouth 2 (two) times daily.    traMADoL (ULTRAM) 50 mg tablet Take 1 tablet (50 mg total) by mouth 3 (three) times daily as needed for Pain.    traZODone (DESYREL) 50 MG tablet Take 1 tablet (50 mg total) by mouth nightly.    tamsulosin (FLOMAX) 0.4 mg Cap Take 1 capsule (0.4 mg total) by mouth once  daily. for 14 days (Patient not taking: Reported on 6/16/2022)     No current facility-administered medications on file prior to visit.       CBC:  Lab Results   Component Value Date    WBC 5.25 01/04/2023    HGB 15.1 01/04/2023    HCT 46.6 01/04/2023    MCV 89 01/04/2023     01/04/2023     ANC = 2903    CMP:  Sodium   Date Value Ref Range Status   01/04/2023 139 136 - 145 mmol/L Final     Potassium   Date Value Ref Range Status   01/04/2023 4.2 3.5 - 5.1 mmol/L Final     Chloride   Date Value Ref Range Status   01/04/2023 107 95 - 110 mmol/L Final     CO2   Date Value Ref Range Status   01/04/2023 23 23 - 29 mmol/L Final     Glucose   Date Value Ref Range Status   01/04/2023 111 (H) 70 - 110 mg/dL Final     BUN   Date Value Ref Range Status   01/04/2023 12 6 - 20 mg/dL Final     Creatinine   Date Value Ref Range Status   01/04/2023 1.0 0.5 - 1.4 mg/dL Final     Calcium   Date Value Ref Range Status   01/04/2023 10.9 (H) 8.7 - 10.5 mg/dL Final     Total Protein   Date Value Ref Range Status   01/04/2023 7.6 6.0 - 8.4 g/dL Final     Albumin   Date Value Ref Range Status   01/04/2023 3.8 3.5 - 5.2 g/dL Final     Total Bilirubin   Date Value Ref Range Status   01/04/2023 0.6 0.1 - 1.0 mg/dL Final     Comment:     For infants and newborns, interpretation of results should be based  on gestational age, weight and in agreement with clinical  observations.    Premature Infant recommended reference ranges:  Up to 24 hours.............<8.0 mg/dL  Up to 48 hours............<12.0 mg/dL  3-5 days..................<15.0 mg/dL  6-29 days.................<15.0 mg/dL       Alkaline Phosphatase   Date Value Ref Range Status   01/04/2023 178 (H) 55 - 135 U/L Final     AST   Date Value Ref Range Status   01/04/2023 43 (H) 10 - 40 U/L Final     ALT   Date Value Ref Range Status   01/04/2023 66 (H) 10 - 44 U/L Final     Anion Gap   Date Value Ref Range Status   01/04/2023 9 8 - 16 mmol/L Final     eGFR if    Date  Value Ref Range Status   06/15/2022 >60 >60 mL/min/1.73 m^2 Final   06/15/2022 >60 >60 mL/min/1.73 m^2 Final     eGFR if non    Date Value Ref Range Status   06/15/2022 >60 >60 mL/min/1.73 m^2 Final     Comment:     Calculation used to obtain the estimated glomerular filtration  rate (eGFR) is the CKD-EPI equation.      06/15/2022 >60 >60 mL/min/1.73 m^2 Final     Comment:     Calculation used to obtain the estimated glomerular filtration  rate (eGFR) is the CKD-EPI equation.            Specimen UA  Urine, Clean Catch  Urine, Clean Catch  Urine, Clean Catch  Urine, Clean Catch  Urine, Clean Catch  Urine, Clean Catch  Urine, Clean Catch    Color, UA Yellow, Straw, Jennifer Yellow  Yellow  Yellow  Yellow  Yellow  Yellow  Yellow    Appearance, UA Clear Clear  Clear  Clear  Clear  Clear  Clear  Clear    pH, UA 5.0 - 8.0 6.0  6.0  6.0  6.0  6.0  7.0  6.0    Specific Gravity, UA 1.005 - 1.030 >=1.030 Abnormal   >=1.030 Abnormal   1.020  1.020  1.010  1.020  1.025    Protein, UA Negative Trace Abnormal   Negative CM  Negative CM  Negative CM  Negative CM  Negative CM  Negative CM    Comment: Recommend a 24 hour urine protein or a urine   protein/creatinine ratio if globulin induced proteinuria is   clinically suspected.    Glucose, UA Negative Negative  Negative  Negative  Negative  Negative  Negative  Negative    Ketones, UA Negative Negative  Negative  Negative  Trace Abnormal   Negative  Negative  Negative    Bilirubin (UA) Negative Negative  Negative  Negative  Negative  Negative  Negative  Negative    Occult Blood UA Negative Negative  2+ Abnormal   Negative  Negative  Negative  Negative  Negative    Nitrite, UA Negative Negative  Negative  Negative  Negative  Negative  Negative  Negative    Leukocytes, UA Negative Negative  Negative  Negative  Negative  Negative  Negative  Negative    Resulting Agency  [facility]              Specimen Collected: 01/04/23 10:11 Last Resulted:  01/04/23 10:20             CT Previous  Submitted for interpretation is a CT of the chest/abdomen/pelvis performed  at [facility] [place] on 09/24/2022 utilizing oral and IV contrast.   This CT study is compared to the most recent PET-CT performed at [facility] on 12/08/2022.     Chest:     No pulmonary nodules, lymphadenopathy, pleural effusion, or pericardial  effusion are present.  On the subsequent PET-CT, no new abnormalities have  developed.     Abdomen/pelvis:     Lymph nodes are present in the pericaval and left periaortic  retroperitoneal region which on the subsequent PET-CT of 12/08/2022 are  unchanged in size.  These nodes are hypermetabolic on the PET-CT study and  are consistent with metastatic disease.  One of the larger portacaval  nodes is visible on series 5 image 124 and measures 12 mm.  A left  periaortic retroperitoneal node measures 0.8 cm.  Two mesenteric lymph  nodes remain unchanged between the 09/24/2022 CT study and the subsequent  PET-CT.  One of the larger nodes is best visualized on series 6, image 235  and measures 1.5 cm.  No new nodes have developed between the 2 studies  and there is no evidence of progression of disease.  Hypermetabolism at  the L5-S1 level on the subsequent PET-CT study is related to degenerative  disc disease.  Incidental note is made of small nonobstructing bilateral  renal calculi and gallstones within the gallbladder.     Impression:     1. 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  There is no evidence of progression of metastatic  disease.   2. Nonobstructing bilateral renal calculi and cholelithiasis.     Electronically signed by: Dimitry Nash MD   Date:    12/22/2022   Time:    11:21       All pertinent labs and imaging reviewed.    Assessment:       1. Malignant neoplasm of sigmoid colon    2. 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  Continue Compazine as it has helped with nausea  F/U with Dr. Santo in 2 weeks with labs prior (CBC, CMP, Magnesium, UA) for evaluation prior to next cycle of Tx.         Route Chart for Scheduling    Med Onc Chart Routing      Follow up with physician 2 weeks. f/u with Dr. Santo in 2 week with labs prior:  CBC, CMP, MG, UA prior   Follow up with [facility]O    Infusion scheduling note Proceed with C32 today   Injection scheduling note    Labs    Imaging    Pharmacy appointment    Other referrals        Treatment Plan Information   OP COLORECTAL FOLFIRI + BEVACIZUMAB Q2W   Marah Santo MD   Upcoming Treatment Dates - OP COLORECTAL FOLFIRI + BEVACIZUMAB Q2W    1/4/2023       Pre-Medications       LORazepam injection 1 mg       promethazine (PHENERGAN) 6.25 mg in dextrose 5 % 100 mL IVPB       palonosetron 0.25mg/dexAMETHasone 20mg in NS IVPB       Chemotherapy       bevacizumab (AVASTIN) 5 mg/kg = 680 mg in sodium chloride 0.9% 100 mL chemo infusion       irinotecan (CAMPTOSAR) 440 mg in sodium chloride 0.9% 522 mL chemo infusion       leucovorin calcium 400 mg/m2 = 1,010 mg in dextrose 5 % 250 mL infusion       fluorouracil (ADRUCIL) 2,400 mg/m2 = 6,050 mg in sodium chloride 0.9% 240 mL chemo infusion       Supportive Care       atropine injection 0.4 mg    Supportive Plan Information  IV FLUIDS AND ELECTROLYTES   Brayden Solo MD   Upcoming Treatment Dates - IV FLUIDS AND ELECTROLYTES    No upcoming days in selected categories.    Therapy Plan Information  PORT FLUSH  Flushes  heparin, porcine (PF) 100 unit/mL injection flush 500 Units  500 Units, Intravenous, Every visit  sodium chloride 0.9% flush 10 mL  10 mL, Intravenous, Every visit

## 2023-01-05 DIAGNOSIS — C18.7 MALIGNANT NEOPLASM OF SIGMOID COLON: Primary | ICD-10-CM

## 2023-01-05 DIAGNOSIS — R11.0 NAUSEA: ICD-10-CM

## 2023-01-06 ENCOUNTER — INFUSION (OUTPATIENT)
Dept: INFUSION THERAPY | Facility: HOSPITAL | Age: 53
End: 2023-01-06
Attending: INTERNAL MEDICINE
Payer: MEDICAID

## 2023-01-06 DIAGNOSIS — C77.2 METASTASIS TO INTESTINAL LYMPH NODE: Primary | ICD-10-CM

## 2023-01-06 DIAGNOSIS — C18.7 MALIGNANT NEOPLASM OF SIGMOID COLON: ICD-10-CM

## 2023-01-06 PROCEDURE — A4216 STERILE WATER/SALINE, 10 ML: HCPCS | Mod: PN | Performed by: NURSE PRACTITIONER

## 2023-01-06 PROCEDURE — 25000003 PHARM REV CODE 250: Mod: PN | Performed by: NURSE PRACTITIONER

## 2023-01-06 PROCEDURE — 63600175 PHARM REV CODE 636 W HCPCS: Mod: PN | Performed by: NURSE PRACTITIONER

## 2023-01-06 RX ORDER — SODIUM CHLORIDE 0.9 % (FLUSH) 0.9 %
10 SYRINGE (ML) INJECTION
Status: DISCONTINUED | OUTPATIENT
Start: 2023-01-06 | End: 2023-01-06 | Stop reason: HOSPADM

## 2023-01-06 RX ORDER — HEPARIN 100 UNIT/ML
500 SYRINGE INTRAVENOUS
Status: DISCONTINUED | OUTPATIENT
Start: 2023-01-06 | End: 2023-01-06 | Stop reason: HOSPADM

## 2023-01-06 RX ORDER — LORAZEPAM 1 MG/1
1 TABLET ORAL EVERY 12 HOURS PRN
Qty: 30 TABLET | Refills: 0 | Status: SHIPPED | OUTPATIENT
Start: 2023-01-06 | End: 2023-02-16 | Stop reason: SDUPTHER

## 2023-01-06 RX ADMIN — Medication 10 ML: at 02:01

## 2023-01-06 RX ADMIN — Medication 500 UNITS: at 02:01

## 2023-01-16 ENCOUNTER — PATIENT MESSAGE (OUTPATIENT)
Dept: PSYCHIATRY | Facility: CLINIC | Age: 53
End: 2023-01-16
Payer: MEDICAID

## 2023-01-18 ENCOUNTER — DOCUMENTATION ONLY (OUTPATIENT)
Dept: INFUSION THERAPY | Facility: HOSPITAL | Age: 53
End: 2023-01-18

## 2023-01-18 ENCOUNTER — INFUSION (OUTPATIENT)
Dept: INFUSION THERAPY | Facility: HOSPITAL | Age: 53
End: 2023-01-18
Attending: INTERNAL MEDICINE
Payer: MEDICAID

## 2023-01-18 ENCOUNTER — OFFICE VISIT (OUTPATIENT)
Dept: HEMATOLOGY/ONCOLOGY | Facility: CLINIC | Age: 53
End: 2023-01-18
Payer: MEDICAID

## 2023-01-18 VITALS
TEMPERATURE: 97 F | DIASTOLIC BLOOD PRESSURE: 80 MMHG | HEART RATE: 106 BPM | BODY MASS INDEX: 47.09 KG/M2 | OXYGEN SATURATION: 99 % | HEIGHT: 66 IN | RESPIRATION RATE: 18 BRPM | WEIGHT: 293 LBS | SYSTOLIC BLOOD PRESSURE: 138 MMHG

## 2023-01-18 VITALS
BODY MASS INDEX: 47.09 KG/M2 | WEIGHT: 293 LBS | TEMPERATURE: 97 F | DIASTOLIC BLOOD PRESSURE: 72 MMHG | HEIGHT: 66 IN | OXYGEN SATURATION: 99 % | RESPIRATION RATE: 16 BRPM | SYSTOLIC BLOOD PRESSURE: 135 MMHG | HEART RATE: 87 BPM

## 2023-01-18 DIAGNOSIS — C77.2 METASTASIS TO INTESTINAL LYMPH NODE: Primary | ICD-10-CM

## 2023-01-18 DIAGNOSIS — C18.7 MALIGNANT NEOPLASM OF SIGMOID COLON: Primary | ICD-10-CM

## 2023-01-18 DIAGNOSIS — C18.7 MALIGNANT NEOPLASM OF SIGMOID COLON: ICD-10-CM

## 2023-01-18 DIAGNOSIS — C77.2 METASTASIS TO INTESTINAL LYMPH NODE: ICD-10-CM

## 2023-01-18 PROCEDURE — 3075F SYST BP GE 130 - 139MM HG: CPT | Mod: CPTII,,, | Performed by: NURSE PRACTITIONER

## 2023-01-18 PROCEDURE — 3008F BODY MASS INDEX DOCD: CPT | Mod: CPTII,,, | Performed by: NURSE PRACTITIONER

## 2023-01-18 PROCEDURE — 3079F PR MOST RECENT DIASTOLIC BLOOD PRESSURE 80-89 MM HG: ICD-10-PCS | Mod: CPTII,,, | Performed by: NURSE PRACTITIONER

## 2023-01-18 PROCEDURE — 99999 PR PBB SHADOW E&M-EST. PATIENT-LVL V: CPT | Mod: PBBFAC,,, | Performed by: NURSE PRACTITIONER

## 2023-01-18 PROCEDURE — 96367 TX/PROPH/DG ADDL SEQ IV INF: CPT | Mod: PN

## 2023-01-18 PROCEDURE — 3008F PR BODY MASS INDEX (BMI) DOCUMENTED: ICD-10-PCS | Mod: CPTII,,, | Performed by: NURSE PRACTITIONER

## 2023-01-18 PROCEDURE — 96413 CHEMO IV INFUSION 1 HR: CPT | Mod: PN

## 2023-01-18 PROCEDURE — 99214 OFFICE O/P EST MOD 30 MIN: CPT | Mod: S$PBB,,, | Performed by: NURSE PRACTITIONER

## 2023-01-18 PROCEDURE — 99215 OFFICE O/P EST HI 40 MIN: CPT | Mod: PBBFAC,PN,25 | Performed by: NURSE PRACTITIONER

## 2023-01-18 PROCEDURE — 99214 PR OFFICE/OUTPT VISIT, EST, LEVL IV, 30-39 MIN: ICD-10-PCS | Mod: S$PBB,,, | Performed by: NURSE PRACTITIONER

## 2023-01-18 PROCEDURE — 96416 CHEMO PROLONG INFUSE W/PUMP: CPT | Mod: PN

## 2023-01-18 PROCEDURE — 96417 CHEMO IV INFUS EACH ADDL SEQ: CPT | Mod: PN

## 2023-01-18 PROCEDURE — 63600175 PHARM REV CODE 636 W HCPCS: Mod: PN | Performed by: NURSE PRACTITIONER

## 2023-01-18 PROCEDURE — 96375 TX/PRO/DX INJ NEW DRUG ADDON: CPT | Mod: PN

## 2023-01-18 PROCEDURE — 3075F PR MOST RECENT SYSTOLIC BLOOD PRESS GE 130-139MM HG: ICD-10-PCS | Mod: CPTII,,, | Performed by: NURSE PRACTITIONER

## 2023-01-18 PROCEDURE — 3079F DIAST BP 80-89 MM HG: CPT | Mod: CPTII,,, | Performed by: NURSE PRACTITIONER

## 2023-01-18 PROCEDURE — 25000003 PHARM REV CODE 250: Mod: PN | Performed by: NURSE PRACTITIONER

## 2023-01-18 PROCEDURE — 99999 PR PBB SHADOW E&M-EST. PATIENT-LVL V: ICD-10-PCS | Mod: PBBFAC,,, | Performed by: NURSE PRACTITIONER

## 2023-01-18 PROCEDURE — 96368 THER/DIAG CONCURRENT INF: CPT | Mod: PN

## 2023-01-18 RX ORDER — SODIUM CHLORIDE 0.9 % (FLUSH) 0.9 %
10 SYRINGE (ML) INJECTION
Status: CANCELLED | OUTPATIENT
Start: 2023-01-18

## 2023-01-18 RX ORDER — SODIUM CHLORIDE 0.9 % (FLUSH) 0.9 %
10 SYRINGE (ML) INJECTION
Status: CANCELLED | OUTPATIENT
Start: 2023-01-20

## 2023-01-18 RX ORDER — ATROPINE SULFATE 0.4 MG/ML
0.4 INJECTION, SOLUTION ENDOTRACHEAL; INTRAMEDULLARY; INTRAMUSCULAR; INTRAVENOUS; SUBCUTANEOUS ONCE AS NEEDED
Status: COMPLETED | OUTPATIENT
Start: 2023-01-18 | End: 2023-01-18

## 2023-01-18 RX ORDER — SODIUM CHLORIDE 9 MG/ML
1000 INJECTION, SOLUTION INTRAVENOUS
Status: CANCELLED | OUTPATIENT
Start: 2023-01-20

## 2023-01-18 RX ORDER — SODIUM CHLORIDE 0.9 % (FLUSH) 0.9 %
10 SYRINGE (ML) INJECTION
Status: DISCONTINUED | OUTPATIENT
Start: 2023-01-18 | End: 2023-01-18 | Stop reason: HOSPADM

## 2023-01-18 RX ORDER — LORAZEPAM 2 MG/ML
1 INJECTION INTRAMUSCULAR
Status: COMPLETED | OUTPATIENT
Start: 2023-01-18 | End: 2023-01-18

## 2023-01-18 RX ORDER — LORAZEPAM 2 MG/ML
1 INJECTION INTRAMUSCULAR
Status: CANCELLED | OUTPATIENT
Start: 2023-01-18 | End: 2023-01-18

## 2023-01-18 RX ORDER — ATROPINE SULFATE 0.4 MG/ML
0.4 INJECTION, SOLUTION ENDOTRACHEAL; INTRAMEDULLARY; INTRAMUSCULAR; INTRAVENOUS; SUBCUTANEOUS ONCE AS NEEDED
Status: CANCELLED | OUTPATIENT
Start: 2023-01-18

## 2023-01-18 RX ORDER — HEPARIN 100 UNIT/ML
500 SYRINGE INTRAVENOUS
Status: CANCELLED | OUTPATIENT
Start: 2023-01-18

## 2023-01-18 RX ORDER — HEPARIN 100 UNIT/ML
500 SYRINGE INTRAVENOUS
Status: CANCELLED | OUTPATIENT
Start: 2023-01-20

## 2023-01-18 RX ADMIN — PALONOSETRON 0.25 MG: 0.05 INJECTION, SOLUTION INTRAVENOUS at 11:01

## 2023-01-18 RX ADMIN — FLUOROURACIL 6050 MG: 50 INJECTION, SOLUTION INTRAVENOUS at 03:01

## 2023-01-18 RX ADMIN — PROMETHAZINE HYDROCHLORIDE 6.25 MG: 25 INJECTION INTRAMUSCULAR; INTRAVENOUS at 12:01

## 2023-01-18 RX ADMIN — ATROPINE SULFATE 0.4 MG: 0.4 INJECTION, SOLUTION INTRAVENOUS at 01:01

## 2023-01-18 RX ADMIN — LEUCOVORIN CALCIUM 1000 MG: 350 INJECTION, POWDER, LYOPHILIZED, FOR SOLUTION INTRAMUSCULAR; INTRAVENOUS at 01:01

## 2023-01-18 RX ADMIN — BEVACIZUMAB 680 MG: 400 INJECTION, SOLUTION INTRAVENOUS at 12:01

## 2023-01-18 RX ADMIN — SODIUM CHLORIDE: 9 INJECTION, SOLUTION INTRAVENOUS at 11:01

## 2023-01-18 RX ADMIN — SODIUM CHLORIDE 440 MG: 9 INJECTION, SOLUTION INTRAVENOUS at 01:01

## 2023-01-18 RX ADMIN — LORAZEPAM 1 MG: 2 INJECTION INTRAMUSCULAR; INTRAVENOUS at 11:01

## 2023-01-18 NOTE — PROGRESS NOTES
Oncology Nutrition   Gail Mckinnon   1970    Therapeutic Food Pantry     Pt eligible for [employer] . Order filled out with patient at chairside. All patient questions or concerns regarding  and/or nutrition status addressed. RD to continue to monitor prn and provide patient food while under active treatment.     Food order filled by this RD- will provide to patient at end of infusion today.    Ciera Richardson, DAVIDN, LDN  01/18/2023  1:25 PM

## 2023-01-18 NOTE — Clinical Note
Patient is questioning whether or not you have talked to MDA as she has not hear anything & getting anxious.  Was wondering if you could drop her a line one way or the other

## 2023-01-18 NOTE — PLAN OF CARE
Pt tolerated Avastin/Folfiri well today. Reviewed follow-up appointments. All questions were answered, ambulated independently at d/c.   Blood return noted prior to pump connection, all connections secured with coban, pt has spill kit and tip sheet at home from previous infusions. Verified pump infusing prior to d/c.

## 2023-01-18 NOTE — PROGRESS NOTES
Oncology   Chemotherapy Infusion Visit    Quick Social Service Status Follow Up   Met w/ pt briefly to follow up on social and emotional needs since initiation of treatment.      SW met with pt at chairside. Pt reports she has started to look for alternative housing. She is planning to share an apartment with her daughter to help afford the apartment. She is currently receiving unemployment while waiting for her disability check. She is expecting to receive this in May. Pt requested help with apartment deposit. SW will explore resources for possible assistance. SW also provided pt with [employer] line to call to look for services. Pt said she is interested in looking into Sr apartments that base your rent on income. SW provided her with the name of [employer] and [email or web address] as a site to explore other apartments available.    No other needs noted at this time.         Radha Giordano, MEDINA  01/18/2023  3:03 PM

## 2023-01-18 NOTE — PROGRESS NOTES
Subjective:      Name: Gail Mckinnon  : 1970  MRN: [id number]    CC:  Clearance prior to chemotherapy    HPI:   Gail Mckinnon is a 52 y.o. female, patient of Dr. Santo who presents for evaluation for next chemotherapy tx for a diagnosis of Colon cancer, initially Stage III & now with LN recurrence.    Onc history:  She completed adjuvant FOLFOX in 2020.   She tolerated only 4 cycles of FOLFOX before she developed an infusion reaction to oxaliplatin in cycle 5 was well as cycle 6. She then completed 6 cycles of infusional 5 FU.     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 She was offered second line therapy with FOLFIRI and Avastin.      She presented to the ED on  with left flank pain. CT renal stone study showed Moderate hydronephrosis on the left secondary to 3 mm calculus at the UPJ.     She had a PET scan mid April that showed possible progression of her disease.     She has been to [facility] for another opinion. No change was recommended in systemic therapy but she was offered surgical removal of the aorta caval nodes.  Recent sans at [facility]  show stable disease.       Surgery done 22. Received 2 more cycle of FOLFIRI without Avastin.      She had repeat imaging at [facility] on 22 that unfortunately showed disease progression since her surgery with multiple RP nodes and one mediastinal node.     Interim history:  Today:  23  She presents for a follow up visit prior to next cycle of FOLFIRI + Avastin.  Compazine continues to help with nausea.  No fevers, abdominal discomfort, N/V/D, bleeding, etc.  Anxiety building in not hearing anything from [facility]; questioning if Dr. Santo has heard & if trial or treatment will be done.  To note:  Insurance change from private to Medicaid due to job loss.      Oncology History   Malignant neoplasm of sigmoid colon   3/16/2020 Initial Diagnosis    Malignant neoplasm of sigmoid colon     3/31/2020 Cancer Staged     Staging form: Colon and Rectum, AJCC 8th Edition  - Clinical stage from 3/31/2020: Stage IIIC (cT4b, cN2a, cM0)     5/6/2020 - 11/17/2020 Chemotherapy    Treatment Summary   Plan Name: OP FOLFOX 6 Q2W  Treatment Goal: Curative  Status: Inactive  Start Date: 5/6/2020  End Date: 11/6/2020  Provider: Dylan Leyva MD  Chemotherapy: fluorouraciL injection 945 mg, 400 mg/m2 = 945 mg, Intravenous, [facility] 1 time, 14 of 14 cycles  Administration: 945 mg (5/6/2020), 945 mg (5/20/2020), 945 mg (6/3/2020), 945 mg (6/17/2020), 945 mg (7/29/2020), 945 mg (8/12/2020), 945 mg (8/26/2020), 945 mg (9/9/2020), 945 mg (9/23/2020), 945 mg (10/6/2020), 945 mg (10/21/2020), 945 mg (11/4/2020)  fluorouraciL 2,400 mg/m2 = 5,665 mg in sodium chloride 0.9% 240 mL chemo infusion, 2,400 mg/m2 = 5,665 mg, Intravenous, Over 46 hours, 14 of 14 cycles  Administration: 5,665 mg (5/6/2020), 5,665 mg (5/20/2020), 5,665 mg (6/3/2020), 5,665 mg (6/17/2020), 5,665 mg (7/29/2020), 5,665 mg (8/12/2020), 5,665 mg (8/26/2020), 5,665 mg (9/9/2020), 5,665 mg (9/23/2020), 5,665 mg (10/6/2020), 5,665 mg (10/21/2020), 5,665 mg (11/4/2020)  leucovorin calcium 900 mg in dextrose 5 % 250 mL infusion, 945 mg, Intravenous, [facility] 1 time, 14 of 14 cycles  Administration: 900 mg (5/6/2020), 900 mg (5/20/2020), 900 mg (6/3/2020), 900 mg (6/17/2020), 945 mg (6/30/2020), 945 mg (7/20/2020), 945 mg (7/29/2020), 945 mg (8/12/2020), 945 mg (8/26/2020), 945 mg (9/9/2020), 945 mg (9/23/2020), 945 mg (10/6/2020), 945 mg (10/21/2020), 945 mg (11/4/2020)  oxaliplatin (ELOXATIN) 200 mg in dextrose 5 % 500 mL chemo infusion, 201 mg, Intravenous, Clinic/[facility] 1 time, 6 of 6 cycles  Dose modification: 65 mg/m2 (original dose 85 mg/m2, Cycle 6)  Administration: 200 mg (5/6/2020), 200 mg (5/20/2020), 200 mg (6/3/2020), 200 mg (6/17/2020), 201 mg (6/30/2020), 150 mg (7/20/2020)     6/16/2021 -  Chemotherapy    Treatment Summary   Plan Name: OP COLORECTAL FOLFIRI + BEVACIZUMAB  Q2W  Treatment Goal: Control  Status: Active  Start Date: 6/16/2021  End Date: 5/12/2023 (Planned)  Provider: Marah Santo MD  Chemotherapy: fluorouraciL injection 990 mg, 400 mg/m2 = 990 mg, Intravenous, [facility] 1 time, 15 of 15 cycles  Administration: 990 mg (6/16/2021), 990 mg (6/30/2021), 990 mg (7/14/2021), 980 mg (7/28/2021), 990 mg (8/11/2021), 990 mg (8/25/2021), 990 mg (9/8/2021), 990 mg (9/22/2021), 990 mg (10/6/2021), 990 mg (10/20/2021), 990 mg (11/3/2021), 990 mg (12/1/2021), 990 mg (12/15/2021), 990 mg (11/17/2021), 990 mg (12/28/2021)  fluorouraciL 2,400 mg/m2 = 5,950 mg in sodium chloride 0.9% 240 mL chemo infusion, 2,400 mg/m2 = 5,950 mg, Intravenous, Over 46 hours, 32 of 41 cycles  Administration: 5,950 mg (6/16/2021), 5,950 mg (6/30/2021), 5,950 mg (7/14/2021), 5,880 mg (7/28/2021), 5,930 mg (8/11/2021), 5,930 mg (8/25/2021), 5,930 mg (9/8/2021), 5,930 mg (9/22/2021), 5,930 mg (10/6/2021), 5,930 mg (10/20/2021), 5,930 mg (11/3/2021), 5,930 mg (12/1/2021), 5,930 mg (12/15/2021), 5,930 mg (11/17/2021), 5,930 mg (12/28/2021), 6,050 mg (5/11/2022), 6,025 mg (3/9/2022), 6,025 mg (2/23/2022), 5,930 mg (2/2/2022), 5,930 mg (1/19/2022), 6,050 mg (4/20/2022), 6,025 mg (4/6/2022), 6,025 mg (3/23/2022), 6,050 mg (6/1/2022), 6,050 mg (6/15/2022), 6,050 mg (10/19/2022), 6,050 mg (10/5/2022), 6,050 mg (11/2/2022), 6,050 mg (11/16/2022), 6,050 mg (11/30/2022), 6,050 mg (1/4/2023), 6,050 mg (12/19/2022)  bevacizumab (AVASTIN) 600 mg in sodium chloride 0.9% 100 mL chemo infusion, 660 mg, Intravenous, [facility] 1 time, 30 of 39 cycles  Dose modification: 5 mg/kg (original dose 5 mg/kg, Cycle 26, Reason: MD Discretion, Comment: 5 mg/kg original dose)  Administration: 600 mg (6/30/2021), 600 mg (7/14/2021), 600 mg (7/28/2021), 600 mg (6/16/2021), 600 mg (8/11/2021), 655 mg (8/25/2021), 600 mg (9/8/2021), 600 mg (9/22/2021), 600 mg (10/6/2021), 600 mg (10/20/2021), 600 mg (11/3/2021), 655 mg (12/1/2021), 600  mg (12/15/2021), 600 mg (11/17/2021), 600 mg (12/28/2021), 600 mg (5/11/2022), 600 mg (3/9/2022), 600 mg (2/23/2022), 600 mg (2/2/2022), 600 mg (1/19/2022), 600 mg (4/20/2022), 680 mg (4/6/2022), 600 mg (3/23/2022), 680 mg (10/5/2022), 680 mg (10/19/2022), 680 mg (11/2/2022), 680 mg (11/16/2022), 680 mg (11/30/2022), 680 mg (1/4/2023), 680 mg (12/19/2022)  irinotecan (CAMPTOSAR) 440 mg in sodium chloride 0.9% 500 mL chemo infusion, 446 mg, Intravenous, [facility] 1 time, 32 of 41 cycles  Dose modification: 180 mg/m2 (original dose 180 mg/m2, Cycle 24)  Administration: 440 mg (6/16/2021), 440 mg (6/30/2021), 440 mg (7/14/2021), 440 mg (7/28/2021), 440 mg (8/11/2021), 444 mg (8/25/2021), 440 mg (9/8/2021), 440 mg (9/22/2021), 440 mg (10/6/2021), 440 mg (10/20/2021), 440 mg (11/3/2021), 440 mg (12/1/2021), 440 mg (12/15/2021), 440 mg (11/17/2021), 440 mg (12/28/2021), 440 mg (5/11/2022), 440 mg (3/9/2022), 440 mg (2/23/2022), 440 mg (2/2/2022), 440 mg (1/19/2022), 440 mg (4/20/2022), 440 mg (4/6/2022), 440 mg (3/24/2022), 440 mg (6/1/2022), 440 mg (6/15/2022), 440 mg (10/19/2022), 440 mg (10/5/2022), 440 mg (11/2/2022), 440 mg (11/16/2022), 440 mg (11/30/2022), 440 mg (1/4/2023), 440 mg (12/19/2022)     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   4/3/2020 Initial Diagnosis    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     5/6/2020 - 11/17/2020 Chemotherapy    Treatment Summary   Plan Name: OP FOLFOX 6 Q2W  Treatment Goal: Curative  Status: Inactive  Start Date: 5/6/2020  End Date: 11/6/2020  Provider: Dylan Leyva MD  Chemotherapy: fluorouraciL injection 945 mg, 400 mg/m2 = 945 mg, Intravenous, Clinic/[facility] 1 time, 14 of 14 cycles  Administration: 945 mg (5/6/2020), 945 mg (5/20/2020), 945 mg (6/3/2020), 945 mg (6/17/2020), 945 mg (7/29/2020), 945 mg (8/12/2020), 945 mg (8/26/2020), 945 mg (9/9/2020), 945 mg (9/23/2020), 945 mg (10/6/2020), 945 mg (10/21/2020), 945 mg (11/4/2020)  fluorouraciL 2,400 mg/m2 = 5,665 mg in sodium  chloride 0.9% 240 mL chemo infusion, 2,400 mg/m2 = 5,665 mg, Intravenous, Over 46 hours, 14 of 14 cycles  Administration: 5,665 mg (5/6/2020), 5,665 mg (5/20/2020), 5,665 mg (6/3/2020), 5,665 mg (6/17/2020), 5,665 mg (7/29/2020), 5,665 mg (8/12/2020), 5,665 mg (8/26/2020), 5,665 mg (9/9/2020), 5,665 mg (9/23/2020), 5,665 mg (10/6/2020), 5,665 mg (10/21/2020), 5,665 mg (11/4/2020)  leucovorin calcium 900 mg in dextrose 5 % 250 mL infusion, 945 mg, Intravenous, [facility] 1 time, 14 of 14 cycles  Administration: 900 mg (5/6/2020), 900 mg (5/20/2020), 900 mg (6/3/2020), 900 mg (6/17/2020), 945 mg (6/30/2020), 945 mg (7/20/2020), 945 mg (7/29/2020), 945 mg (8/12/2020), 945 mg (8/26/2020), 945 mg (9/9/2020), 945 mg (9/23/2020), 945 mg (10/6/2020), 945 mg (10/21/2020), 945 mg (11/4/2020)  oxaliplatin (ELOXATIN) 200 mg in dextrose 5 % 500 mL chemo infusion, 201 mg, Intravenous, [facility] 1 time, 6 of 6 cycles  Dose modification: 65 mg/m2 (original dose 85 mg/m2, Cycle 6)  Administration: 200 mg (5/6/2020), 200 mg (5/20/2020), 200 mg (6/3/2020), 200 mg (6/17/2020), 201 mg (6/30/2020), 150 mg (7/20/2020)     6/16/2021 -  Chemotherapy    Treatment Summary   Plan Name: OP COLORECTAL FOLFIRI + BEVACIZUMAB Q2W  Treatment Goal: Control  Status: Active  Start Date: 6/16/2021  End Date: 5/12/2023 (Planned)  Provider: Marah Santo MD  Chemotherapy: fluorouraciL injection 990 mg, 400 mg/m2 = 990 mg, Intravenous, Clinic/[facility] 1 time, 15 of 15 cycles  Administration: 990 mg (6/16/2021), 990 mg (6/30/2021), 990 mg (7/14/2021), 980 mg (7/28/2021), 990 mg (8/11/2021), 990 mg (8/25/2021), 990 mg (9/8/2021), 990 mg (9/22/2021), 990 mg (10/6/2021), 990 mg (10/20/2021), 990 mg (11/3/2021), 990 mg (12/1/2021), 990 mg (12/15/2021), 990 mg (11/17/2021), 990 mg (12/28/2021)  fluorouraciL 2,400 mg/m2 = 5,950 mg in sodium chloride 0.9% 240 mL chemo infusion, 2,400 mg/m2 = 5,950 mg, Intravenous, Over 46 hours, 32 of 41 cycles  Administration:  5,950 mg (6/16/2021), 5,950 mg (6/30/2021), 5,950 mg (7/14/2021), 5,880 mg (7/28/2021), 5,930 mg (8/11/2021), 5,930 mg (8/25/2021), 5,930 mg (9/8/2021), 5,930 mg (9/22/2021), 5,930 mg (10/6/2021), 5,930 mg (10/20/2021), 5,930 mg (11/3/2021), 5,930 mg (12/1/2021), 5,930 mg (12/15/2021), 5,930 mg (11/17/2021), 5,930 mg (12/28/2021), 6,050 mg (5/11/2022), 6,025 mg (3/9/2022), 6,025 mg (2/23/2022), 5,930 mg (2/2/2022), 5,930 mg (1/19/2022), 6,050 mg (4/20/2022), 6,025 mg (4/6/2022), 6,025 mg (3/23/2022), 6,050 mg (6/1/2022), 6,050 mg (6/15/2022), 6,050 mg (10/19/2022), 6,050 mg (10/5/2022), 6,050 mg (11/2/2022), 6,050 mg (11/16/2022), 6,050 mg (11/30/2022), 6,050 mg (1/4/2023), 6,050 mg (12/19/2022)  bevacizumab (AVASTIN) 600 mg in sodium chloride 0.9% 100 mL chemo infusion, 660 mg, Intravenous, Clinic/[facility] 1 time, 30 of 39 cycles  Dose modification: 5 mg/kg (original dose 5 mg/kg, Cycle 26, Reason: MD Discretion, Comment: 5 mg/kg original dose)  Administration: 600 mg (6/30/2021), 600 mg (7/14/2021), 600 mg (7/28/2021), 600 mg (6/16/2021), 600 mg (8/11/2021), 655 mg (8/25/2021), 600 mg (9/8/2021), 600 mg (9/22/2021), 600 mg (10/6/2021), 600 mg (10/20/2021), 600 mg (11/3/2021), 655 mg (12/1/2021), 600 mg (12/15/2021), 600 mg (11/17/2021), 600 mg (12/28/2021), 600 mg (5/11/2022), 600 mg (3/9/2022), 600 mg (2/23/2022), 600 mg (2/2/2022), 600 mg (1/19/2022), 600 mg (4/20/2022), 680 mg (4/6/2022), 600 mg (3/23/2022), 680 mg (10/5/2022), 680 mg (10/19/2022), 680 mg (11/2/2022), 680 mg (11/16/2022), 680 mg (11/30/2022), 680 mg (1/4/2023), 680 mg (12/19/2022)  irinotecan (CAMPTOSAR) 440 mg in sodium chloride 0.9% 500 mL chemo infusion, 446 mg, Intravenous, [facility] 1 time, 32 of 41 cycles  Dose modification: 180 mg/m2 (original dose 180 mg/m2, Cycle 24)  Administration: 440 mg (6/16/2021), 440 mg (6/30/2021), 440 mg (7/14/2021), 440 mg (7/28/2021), 440 mg (8/11/2021), 444 mg (8/25/2021), 440 mg (9/8/2021), 440 mg (9/22/2021),  440 mg (10/6/2021), 440 mg (10/20/2021), 440 mg (11/3/2021), 440 mg (2021), 440 mg (12/15/2021), 440 mg (2021), 440 mg (2021), 440 mg (2022), 440 mg (3/9/2022), 440 mg (2022), 440 mg (2022), 440 mg (2022), 440 mg (2022), 440 mg (2022), 440 mg (3/24/2022), 440 mg (2022), 440 mg (6/15/2022), 440 mg (10/19/2022), 440 mg (10/5/2022), 440 mg (2022), 440 mg (2022), 440 mg (2022), 440 mg (2023), 440 mg (2022)            Past Medical History:   Diagnosis Date    Anxiety     Depression     FH: ovarian cancer 3/16/2020    Hx of psychiatric care     Effexor, Paxil, Lexapro, Zoloft, Wellbutrin, Trazodone Buspar    Hyperthyroidism     Hypothyroid     Kidney calculi     Malignant neoplasm of sigmoid colon 3/16/2020    Menorrhagia     Multinodular goiter 2012    Palpitation     Psychiatric problem     Venous insufficiency        Past Surgical History:   Procedure Laterality Date     SECTION, CLASSIC      x3    COLONOSCOPY N/A 2020    Procedure: COLONOSCOPY;  Surgeon: Shane Parker MD;  Location: [facility] ENDO;  Service: Endoscopy;  Laterality: N/A;    COLONOSCOPY N/A 2022    Procedure: COLONOSCOPY;  Surgeon: Shane Parker MD;  Location: [facility] ENDO;  Service: Endoscopy;  Laterality: N/A;    CYSTOSCOPY W/ URETERAL STENT PLACEMENT Bilateral 3/25/2020    Procedure: CYSTOSCOPY, WITH URETERAL STENT INSERTION;  Surgeon: Claudio Tyson MD;  Location: [facility] OR [address] [place] [address];  Service: Urology;  Laterality: Bilateral;    INSERTION OF TUNNELED CENTRAL VENOUS CATHETER (CVC) WITH SUBCUTANEOUS PORT N/A 2020    Procedure: [id number];  Surgeon: Stephen Diagnostic Provider;  Location: [facility] OR 2ND FLR;  Service: Radiology;  Laterality: N/A;  Room 189/Cindy    LAPAROSCOPIC SIGMOIDECTOMY N/A 3/25/2020    Procedure: COLECTOMY, SIGMOID, LAPAROSCOPIC, flex sig, ERAS high;  Surgeon: Silvio Man MD;  Location: [facility] OR 2ND FLR;   Service: Colon and Rectal;  Laterality: N/A;    MOBILIZATION OF SPLENIC FLEXURE  3/25/2020    Procedure: MOBILIZATION, SPLENIC FLEXURE;  Surgeon: Silvio Man MD;  [facility];  Service: Colon and Rectal;;    tonsillectomy      TOTAL ABDOMINAL HYSTERECTOMY W/ BILATERAL SALPINGOOPHORECTOMY N/A 3/25/2020    Procedure: HYSTERECTOMY, TOTAL, ABDOMINAL, WITH BILATERAL SALPINGO-OOPHORECTOMY;  Surgeon: Keron Brady MD;  Location: [facility] OR [address] [place] [address];  Service: Oncology;  Laterality: N/A;       Family History   Problem Relation Age of Onset    Heart disease Father     Diabetes Mother 65    Drug abuse Brother     Drug abuse Brother     Cancer Maternal Aunt         lung cancer    Cancer Maternal Grandmother         stomach cancer- started in ovaries    Ovarian cancer Maternal Grandmother         stomach cancer- started in ovaries    Colon cancer Paternal Uncle     Cancer Paternal Uncle     Ovarian cancer Maternal Aunt     Ovarian cancer Maternal Aunt     Ovarian cancer Cousin         mother had ovarian cancer    Drug abuse Son     Drug abuse Son         clean/sober since 2012    Colon cancer Son     No Known Problems Daughter     Uterine cancer Neg Hx     Breast cancer Neg Hx        Social History     Socioeconomic History    Marital status:     Number of children: 3   Tobacco Use    Smoking status: Never    Smokeless tobacco: Never   Substance and Sexual Activity    Alcohol use: Yes     Comment: occasionally- twice monthly    Drug use: Never    Sexual activity: Yes     Partners: Male     Birth control/protection: See Surgical Hx   Social History Narrative        2 adult sons, 1 daughter     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   Cardiovascular:  Negative for chest pain.   Respiratory:  Negative for cough.    Hematologic/Lymphatic: Negative for adenopathy.   Skin:  Negative for rash.   Musculoskeletal:  Negative for back pain.   Gastrointestinal:  Negative for abdominal pain and diarrhea.   Genitourinary:  Negative for frequency.   All other systems reviewed and are negative.         Objective:   Weight:  Gain of 6 1/2 pounds in 2 weeks  Vitals:    01/18/23 1029   BP: 138/80   Pulse: 106   Resp: 18   Temp: 97 °F (36.1 °C)   TempSrc: Temporal   SpO2: 99%   Weight: (!) 137.5 kg (303 lb 2.1 oz)   Height: 5' 6" (1.676 m)        Physical Exam  Vitals reviewed.   Constitutional:       General: She is not in acute distress.  HENT:      Head: Normocephalic and atraumatic.      Mouth/Throat:      Pharynx: Oropharynx is clear.   Eyes:      Conjunctiva/sclera: Conjunctivae normal.   Cardiovascular:      Rate and Rhythm: Normal rate and regular rhythm.      Pulses: Normal pulses.      Heart sounds: Normal heart sounds.   Pulmonary:      Effort: Pulmonary effort is normal. No respiratory distress.      Breath sounds: No wheezing.   Abdominal:      General: Bowel sounds are normal.      Palpations: Abdomen is soft.   Musculoskeletal:      Cervical back: Neck supple.      Right lower leg: No edema.      Left lower leg: No edema.   Lymphadenopathy:      Cervical: No cervical adenopathy.      Upper Body:      Right upper body: No supraclavicular or axillary adenopathy.      Left upper body: No supraclavicular or axillary adenopathy.   Skin:     General: Skin is warm and dry.      Capillary Refill: Capillary refill takes less than 2 seconds.      Comments: Port a cath to RCW without erythema   Neurological:      Mental Status: She is alert and oriented to person, place, and time.   Psychiatric:         Behavior: Behavior normal.         Thought Content: Thought content normal.           Current Outpatient " Medications on File Prior to Visit   Medication Sig    acetaminophen (TYLENOL) 500 MG tablet Take 2 tablets (1,000 mg total) by mouth every 8 (eight) hours.    buPROPion (WELLBUTRIN SR) 150 MG TBSR 12 hr tablet Take 1 tablet (150 mg total) by mouth 2 (two) times daily.    busPIRone (BUSPAR) 10 MG tablet Take 1 tablet (10 mg total) by mouth 2 (two) times daily.    granisetron (SANCUSO) 3.1 mg/24 hour Place 1 patch (3.1 mg total) onto the skin every 7 days AS DIRECTED.    Lactobacillus rhamnosus GG (CULTURELLE) 10 billion cell capsule Take 1 capsule by mouth once daily.    lactulose (CHRONULAC) 10 gram/15 mL solution Take 30 mLs (20 g total) by mouth daily as needed (constipation).    levothyroxine (SYNTHROID) 200 MCG tablet Take 1 tablet (200 mcg total) by mouth once daily.    LIDOcaine-prilocaine (EMLA) cream Apply topically as needed (30 to 60 min prior chemo or port use).    LORazepam (ATIVAN) 1 MG tablet Take 1 tablet (1 mg total) by mouth every 12 (twelve) hours as needed for Anxiety (nausea).    magic mouthwash diphen/antac/lidoc/nysta Swish and swallow 5 mL every 4 hours as needed for mouth pain/ulcers    multivitamin (THERAGRAN) per tablet Take 1 tablet by mouth once daily.    olmesartan (BENICAR) 20 MG tablet Take 1 tablet (20 mg total) by mouth once daily.    ondansetron (ZOFRAN-ODT) 8 MG TbDL Take 1 tablet (8 mg total) by mouth every 8 (eight) hours as needed.    prochlorperazine (COMPAZINE) 10 MG tablet Take 1 tablet (10 mg total) by mouth every 6 (six) hours as needed.    promethazine (PHENERGAN) 12.5 MG Tab Take 1 to 2 tablets by mouth every 6 hours as needed for nausea persistent despite zofran    senna-docusate 8.6-50 mg (PERICOLACE) 8.6-50 mg per tablet Take 2 tablets by mouth 2 (two) times daily.    traMADoL (ULTRAM) 50 mg tablet Take 1 tablet (50 mg total) by mouth 3 (three) times daily as needed for Pain.    traZODone (DESYREL) 50 MG tablet Take 1 tablet (50 mg total) by mouth nightly.     tamsulosin (FLOMAX) 0.4 mg Cap Take 1 capsule (0.4 mg total) by mouth once daily. for 14 days (Patient not taking: Reported on 6/16/2022)     No current facility-administered medications on file prior to visit.       CBC:  Lab Results   Component Value Date    WBC 4.64 01/18/2023    HGB 14.4 01/18/2023    HCT 45.0 01/18/2023    MCV 90 01/18/2023     01/18/2023     ANC = 2626    CMP:  Sodium   Date Value Ref Range Status   01/18/2023 141 136 - 145 mmol/L Final     Potassium   Date Value Ref Range Status   01/18/2023 3.8 3.5 - 5.1 mmol/L Final     Chloride   Date Value Ref Range Status   01/18/2023 109 95 - 110 mmol/L Final     CO2   Date Value Ref Range Status   01/18/2023 22 (L) 23 - 29 mmol/L Final     Glucose   Date Value Ref Range Status   01/18/2023 145 (H) 70 - 110 mg/dL Final     BUN   Date Value Ref Range Status   01/18/2023 11 6 - 20 mg/dL Final     Creatinine   Date Value Ref Range Status   01/18/2023 0.9 0.5 - 1.4 mg/dL Final     Calcium   Date Value Ref Range Status   01/18/2023 10.2 8.7 - 10.5 mg/dL Final     Total Protein   Date Value Ref Range Status   01/18/2023 7.1 6.0 - 8.4 g/dL Final     Albumin   Date Value Ref Range Status   01/18/2023 3.6 3.5 - 5.2 g/dL Final     Total Bilirubin   Date Value Ref Range Status   01/18/2023 0.4 0.1 - 1.0 mg/dL Final     Comment:     For infants and newborns, interpretation of results should be based  on gestational age, weight and in agreement with clinical  observations.    Premature Infant recommended reference ranges:  Up to 24 hours.............<8.0 mg/dL  Up to 48 hours............<12.0 mg/dL  3-5 days..................<15.0 mg/dL  6-29 days.................<15.0 mg/dL       Alkaline Phosphatase   Date Value Ref Range Status   01/18/2023 149 (H) 55 - 135 U/L Final     AST   Date Value Ref Range Status   01/18/2023 46 (H) 10 - 40 U/L Final     ALT   Date Value Ref Range Status   01/18/2023 75 (H) 10 - 44 U/L Final     Anion Gap   Date Value Ref Range  Status   01/18/2023 10 8 - 16 mmol/L Final     eGFR if    Date Value Ref Range Status   06/15/2022 >60 >60 mL/min/1.73 m^2 Final   06/15/2022 >60 >60 mL/min/1.73 m^2 Final     eGFR if non    Date Value Ref Range Status   06/15/2022 >60 >60 mL/min/1.73 m^2 Final     Comment:     Calculation used to obtain the estimated glomerular filtration  rate (eGFR) is the CKD-EPI equation.      06/15/2022 >60 >60 mL/min/1.73 m^2 Final     Comment:     Calculation used to obtain the estimated glomerular filtration  rate (eGFR) is the CKD-EPI equation.          Magnesium:  2.1  Component Ref Range & Units 10:00   (1/18/23) 2 wk ago   (1/4/23) 1 mo ago   (12/12/22) 1 mo ago   (11/30/22) 2 mo ago   (11/16/22) 3 mo ago   (10/3/22) 8 mo ago   (5/9/22)   Specimen UA  Urine, Clean Catch  Urine, Clean Catch  Urine, Clean Catch  Urine, Clean Catch  Urine, Clean Catch  Urine, Clean Catch  Urine, Clean Catch    Color, UA Yellow, Straw, Jennifer Yellow  Yellow  Yellow  Yellow  Yellow  Yellow  Yellow    Appearance, UA Clear Clear  Clear  Clear  Clear  Clear  Clear  Clear    pH, UA 5.0 - 8.0 6.0  6.0  6.0  6.0  6.0  6.0  7.0    Specific Gravity, UA 1.005 - 1.030 1.025  >=1.030 Abnormal   >=1.030 Abnormal   1.020  1.020  1.010  1.020    Protein, UA Negative Negative  Trace Abnormal  CM  Negative CM  Negative CM  Negative CM  Negative CM  Negative CM    Comment: Recommend a 24 hour urine protein or a urine   protein/creatinine ratio if globulin induced proteinuria is   clinically suspected.    Glucose, UA Negative Negative  Negative  Negative  Negative  Negative  Negative  Negative    Ketones, UA Negative Negative  Negative  Negative  Negative  Trace Abnormal   Negative  Negative    Bilirubin (UA) Negative Negative  Negative  Negative  Negative  Negative  Negative  Negative    Occult Blood UA Negative Negative  Negative  2+ Abnormal   Negative  Negative  Negative  Negative    Nitrite, UA Negative Negative  Negative   Negative  Negative  Negative  Negative  Negative    Leukocytes, UA Negative Negative  Negative  Negative  Negative  Negative  Negative  Negative    Resulting Agency  [facility]         CT Previous  Submitted for interpretation is a CT of the chest/abdomen/pelvis performed  at [facility] [employer] on 09/24/2022 utilizing oral and IV contrast.   This CT study is compared to the most recent PET-CT performed at [facility] on 12/08/2022.     Chest:     No pulmonary nodules, lymphadenopathy, pleural effusion, or pericardial  effusion are present.  On the subsequent PET-CT, no new abnormalities have  developed.     Abdomen/pelvis:     Lymph nodes are present in the pericaval and left periaortic  retroperitoneal region which on the subsequent PET-CT of 12/08/2022 are  unchanged in size.  These nodes are hypermetabolic on the PET-CT study and  are consistent with metastatic disease.  One of the larger portacaval  nodes is visible on series 5 image 124 and measures 12 mm.  A left  periaortic retroperitoneal node measures 0.8 cm.  Two mesenteric lymph  nodes remain unchanged between the 09/24/2022 CT study and the subsequent  PET-CT.  One of the larger nodes is best visualized on series 6, image 235  and measures 1.5 cm.  No new nodes have developed between the 2 studies  and there is no evidence of progression of disease.  Hypermetabolism at  the L5-S1 level on the subsequent PET-CT study is related to degenerative  disc disease.  Incidental note is made of small nonobstructing bilateral  renal calculi and gallstones within the gallbladder.     Impression:     1. 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  There is no evidence of progression of metastatic  disease.   2. Nonobstructing bilateral renal calculi and cholelithiasis.     Electronically signed  by: Dimitry Nash MD   Date:    12/22/2022   Time:    11:21       All pertinent labs and imaging reviewed.    Assessment:       1. Malignant neoplasm of sigmoid colon    2. 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  Continue Compazine as it has helped with nausea  F/U with Dr. Santo in 2 weeks with labs prior (CBC, CMP, Magnesium, UA) for evaluation prior to next cycle of Tx.     Route Chart for Scheduling    Med Onc Chart Routing      Follow up with physician 2 weeks. Must see Dr. Santo in 2 weeks (02/01) with labs the day before:  CBC, CMP, MG, UA (01/31)   Follow up with [facility]    Infusion scheduling note Proceed with C33 today   Injection scheduling note    Labs    Imaging    Pharmacy appointment    Other referrals        Treatment Plan Information   OP COLORECTAL FOLFIRI + BEVACIZUMAB Q2W   Marah Santo MD   Upcoming Treatment Dates - OP COLORECTAL FOLFIRI + BEVACIZUMAB Q2W    1/18/2023       Pre-Medications       LORazepam injection 1 mg       promethazine (PHENERGAN) 6.25 mg in dextrose 5 % 100 mL IVPB       palonosetron 0.25mg/dexAMETHasone 20mg in NS IVPB       Chemotherapy       bevacizumab (AVASTIN) 5 mg/kg = 680 mg in sodium chloride 0.9% 100 mL chemo infusion       irinotecan (CAMPTOSAR) 440 mg in sodium chloride 0.9% 522 mL chemo infusion       leucovorin calcium 400 mg/m2 = 1,010 mg in dextrose 5 % 250 mL infusion       fluorouracil (ADRUCIL) 2,400 mg/m2 = 6,050 mg in sodium chloride 0.9% 240 mL chemo infusion       Supportive Care       atropine injection 0.4 mg  2/1/2023       Pre-Medications       LORazepam injection 1 mg       promethazine (PHENERGAN) 6.25 mg in dextrose 5 % 100 mL IVPB       palonosetron 0.25mg/dexAMETHasone 20mg in NS IVPB       Chemotherapy       bevacizumab (AVASTIN) 5 mg/kg = 680 mg in sodium  chloride 0.9% 100 mL chemo infusion       irinotecan (CAMPTOSAR) 440 mg in sodium chloride 0.9% 522 mL chemo infusion       leucovorin calcium 400 mg/m2 = 1,010 mg in dextrose 5 % 250 mL infusion       fluorouracil (ADRUCIL) 2,400 mg/m2 = 6,050 mg in sodium chloride 0.9% 240 mL chemo infusion       Supportive Care       atropine injection 0.4 mg  2/15/2023       Pre-Medications       LORazepam injection 1 mg       promethazine (PHENERGAN) 6.25 mg in dextrose 5 % 100 mL IVPB       palonosetron 0.25mg/dexAMETHasone 20mg in NS IVPB       Chemotherapy       bevacizumab (AVASTIN) 5 mg/kg = 680 mg in sodium chloride 0.9% 100 mL chemo infusion       irinotecan (CAMPTOSAR) 440 mg in sodium chloride 0.9% 522 mL chemo infusion       leucovorin calcium 400 mg/m2 = 1,010 mg in dextrose 5 % 250 mL infusion       fluorouracil (ADRUCIL) 2,400 mg/m2 = 6,050 mg in sodium chloride 0.9% 240 mL chemo infusion       Supportive Care       atropine injection 0.4 mg  3/1/2023       Pre-Medications       LORazepam injection 1 mg       promethazine (PHENERGAN) 6.25 mg in dextrose 5 % 100 mL IVPB       palonosetron 0.25mg/dexAMETHasone 20mg in NS IVPB       Chemotherapy       bevacizumab (AVASTIN) 5 mg/kg = 680 mg in sodium chloride 0.9% 100 mL chemo infusion       irinotecan (CAMPTOSAR) 440 mg in sodium chloride 0.9% 522 mL chemo infusion       leucovorin calcium 400 mg/m2 = 1,010 mg in dextrose 5 % 250 mL infusion       fluorouracil (ADRUCIL) 2,400 mg/m2 = 6,050 mg in sodium chloride 0.9% 240 mL chemo infusion       Supportive Care       atropine injection 0.4 mg    Supportive Plan Information  IV FLUIDS AND ELECTROLYTES   Brayden Solo MD   Upcoming Treatment Dates - IV FLUIDS AND ELECTROLYTES    No upcoming days in selected categories.    Therapy Plan Information  PORT FLUSH  Flushes  heparin, porcine (PF) 100 unit/mL injection flush 500 Units  500 Units, Intravenous, Every visit  sodium chloride 0.9% flush 10 mL  10 mL, Intravenous,  Every visit

## 2023-01-20 ENCOUNTER — INFUSION (OUTPATIENT)
Dept: INFUSION THERAPY | Facility: HOSPITAL | Age: 53
End: 2023-01-20
Attending: INTERNAL MEDICINE
Payer: MEDICAID

## 2023-01-20 VITALS
OXYGEN SATURATION: 97 % | HEIGHT: 66 IN | HEART RATE: 68 BPM | SYSTOLIC BLOOD PRESSURE: 131 MMHG | BODY MASS INDEX: 47.09 KG/M2 | DIASTOLIC BLOOD PRESSURE: 71 MMHG | RESPIRATION RATE: 17 BRPM | TEMPERATURE: 98 F | WEIGHT: 293 LBS

## 2023-01-20 DIAGNOSIS — C77.2 METASTASIS TO INTESTINAL LYMPH NODE: Primary | ICD-10-CM

## 2023-01-20 DIAGNOSIS — C18.7 MALIGNANT NEOPLASM OF SIGMOID COLON: ICD-10-CM

## 2023-01-20 PROCEDURE — 63600175 PHARM REV CODE 636 W HCPCS: Mod: PN | Performed by: NURSE PRACTITIONER

## 2023-01-20 PROCEDURE — 25000003 PHARM REV CODE 250: Mod: PN | Performed by: NURSE PRACTITIONER

## 2023-01-20 PROCEDURE — 96360 HYDRATION IV INFUSION INIT: CPT | Mod: PN

## 2023-01-20 RX ORDER — SODIUM CHLORIDE 0.9 % (FLUSH) 0.9 %
10 SYRINGE (ML) INJECTION
Status: DISCONTINUED | OUTPATIENT
Start: 2023-01-20 | End: 2023-01-20 | Stop reason: HOSPADM

## 2023-01-20 RX ORDER — HEPARIN 100 UNIT/ML
500 SYRINGE INTRAVENOUS
Status: DISCONTINUED | OUTPATIENT
Start: 2023-01-20 | End: 2023-01-20 | Stop reason: HOSPADM

## 2023-01-20 RX ORDER — SODIUM CHLORIDE 9 MG/ML
1000 INJECTION, SOLUTION INTRAVENOUS
Status: DISCONTINUED | OUTPATIENT
Start: 2023-01-20 | End: 2023-01-20 | Stop reason: HOSPADM

## 2023-01-20 RX ADMIN — SODIUM CHLORIDE 1000 ML: 9 INJECTION, SOLUTION INTRAVENOUS at 02:01

## 2023-01-20 RX ADMIN — Medication 500 UNITS: at 03:01

## 2023-01-20 NOTE — PLAN OF CARE
Problem: Adult Inpatient Plan of Care  Goal: Patient-Specific Goal (Individualized)  Outcome: Ongoing, Progressing  Flowsheets (Taken 1/20/2023 1400)  Anxieties, Fears or Concerns:   nausea   fatigue  Individualized Care Needs: recliner, conversation     Problem: Fatigue  Goal: Improved Activity Tolerance  Intervention: Promote Improved Energy  Flowsheets (Taken 1/20/2023 1400)  Fatigue Management: frequent rest breaks encouraged  Sleep/Rest Enhancement:   natural light exposure provided   noise level reduced   room darkened  Activity Management:   Ambulated -L4   Ambulated in reyes - L4

## 2023-01-20 NOTE — PLAN OF CARE
Problem: Adult Inpatient Plan of Care  Goal: Plan of Care Review  Outcome: Ongoing, Progressing  Flowsheets (Taken 1/20/2023 1061)  Plan of Care Reviewed With: patient     Patient tolerated IVFs treatment well. Port flushed with blood return, heparin locked, and de-accessed. [facility] and reviewed. Ambulates per self. No acute distress noted.

## 2023-01-24 ENCOUNTER — TELEPHONE (OUTPATIENT)
Dept: INFUSION THERAPY | Facility: HOSPITAL | Age: 53
End: 2023-01-24
Payer: MEDICAID

## 2023-01-24 NOTE — TELEPHONE ENCOUNTER
JULIANNE called pt to follow up on resource development. JULIANNE requested pt's permission to send a request to register pt with the [employer] for the Financial Aid Funds. She agreed and JULIANNE sent in registration for pt via computer.  They will contact pt through e-mail. Pt voiced understanding.     JULIANNE also contacted 211 to look for assistance with rent/ and or deposits.  JULIANNE given number for [employer] [phone] and [email or web address] [phone] and possible assistance. [facility] ruth snot have funds at this time. JULIANNE left message for [email or web address] and left message for a return ac all.     SW to follow up.       Radha Giordano, JENNIEW

## 2023-01-27 ENCOUNTER — TELEPHONE (OUTPATIENT)
Dept: INFUSION THERAPY | Facility: HOSPITAL | Age: 53
End: 2023-01-27
Payer: MEDICAID

## 2023-01-27 NOTE — TELEPHONE ENCOUNTER
SW received a call from pt requesting assistance in completing forms for a [employer] [facility] program. Pt sent SW forms via e-mail.SW completed forms and sent them back to pt via e-mail.     Radha Giordano LCSW

## 2023-01-29 ENCOUNTER — PATIENT MESSAGE (OUTPATIENT)
Dept: HEMATOLOGY/ONCOLOGY | Facility: CLINIC | Age: 53
End: 2023-01-29
Payer: MEDICAID

## 2023-01-30 ENCOUNTER — LAB VISIT (OUTPATIENT)
Dept: LAB | Facility: HOSPITAL | Age: 53
End: 2023-01-30
Attending: INTERNAL MEDICINE
Payer: MEDICAID

## 2023-01-30 ENCOUNTER — OFFICE VISIT (OUTPATIENT)
Dept: HEMATOLOGY/ONCOLOGY | Facility: CLINIC | Age: 53
End: 2023-01-30
Payer: MEDICAID

## 2023-01-30 VITALS
WEIGHT: 293 LBS | HEIGHT: 66 IN | RESPIRATION RATE: 18 BRPM | OXYGEN SATURATION: 97 % | HEART RATE: 112 BPM | BODY MASS INDEX: 47.09 KG/M2 | SYSTOLIC BLOOD PRESSURE: 98 MMHG | TEMPERATURE: 98 F | DIASTOLIC BLOOD PRESSURE: 60 MMHG

## 2023-01-30 DIAGNOSIS — C18.7 MALIGNANT NEOPLASM OF SIGMOID COLON: ICD-10-CM

## 2023-01-30 DIAGNOSIS — R11.0 CHEMOTHERAPY-INDUCED NAUSEA: ICD-10-CM

## 2023-01-30 DIAGNOSIS — C18.7 MALIGNANT NEOPLASM OF SIGMOID COLON: Primary | ICD-10-CM

## 2023-01-30 DIAGNOSIS — D70.1 CHEMOTHERAPY-INDUCED NEUTROPENIA: ICD-10-CM

## 2023-01-30 DIAGNOSIS — T45.1X5A CHEMOTHERAPY-INDUCED NEUTROPENIA: ICD-10-CM

## 2023-01-30 DIAGNOSIS — C77.9 SECONDARY ADENOCARCINOMA OF LYMPH NODE: ICD-10-CM

## 2023-01-30 DIAGNOSIS — T45.1X5A CHEMOTHERAPY-INDUCED NAUSEA: ICD-10-CM

## 2023-01-30 DIAGNOSIS — C77.2 METASTASIS TO INTESTINAL LYMPH NODE: ICD-10-CM

## 2023-01-30 LAB
ALBUMIN SERPL BCP-MCNC: 3.9 G/DL (ref 3.5–5.2)
ALP SERPL-CCNC: 153 U/L (ref 55–135)
ALT SERPL W/O P-5'-P-CCNC: 83 U/L (ref 10–44)
ANION GAP SERPL CALC-SCNC: 9 MMOL/L (ref 8–16)
AST SERPL-CCNC: 48 U/L (ref 10–40)
BASOPHILS # BLD AUTO: 0.02 K/UL (ref 0–0.2)
BASOPHILS NFR BLD: 0.4 % (ref 0–1.9)
BILIRUB SERPL-MCNC: 0.4 MG/DL (ref 0.1–1)
BILIRUB UR QL STRIP: NEGATIVE
BUN SERPL-MCNC: 13 MG/DL (ref 6–20)
CALCIUM SERPL-MCNC: 10.6 MG/DL (ref 8.7–10.5)
CHLORIDE SERPL-SCNC: 107 MMOL/L (ref 95–110)
CLARITY UR: CLEAR
CO2 SERPL-SCNC: 24 MMOL/L (ref 23–29)
COLOR UR: YELLOW
CREAT SERPL-MCNC: 0.9 MG/DL (ref 0.5–1.4)
DIFFERENTIAL METHOD: ABNORMAL
EOSINOPHIL # BLD AUTO: 0.3 K/UL (ref 0–0.5)
EOSINOPHIL NFR BLD: 5.2 % (ref 0–8)
ERYTHROCYTE [DISTWIDTH] IN BLOOD BY AUTOMATED COUNT: 18.6 % (ref 11.5–14.5)
EST. GFR  (NO RACE VARIABLE): >60 ML/MIN/1.73 M^2
GLUCOSE SERPL-MCNC: 120 MG/DL (ref 70–110)
GLUCOSE UR QL STRIP: NEGATIVE
HCT VFR BLD AUTO: 46.6 % (ref 37–48.5)
HGB BLD-MCNC: 14.9 G/DL (ref 12–16)
HGB UR QL STRIP: NEGATIVE
IMM GRANULOCYTES # BLD AUTO: 0.01 K/UL (ref 0–0.04)
IMM GRANULOCYTES NFR BLD AUTO: 0.2 % (ref 0–0.5)
KETONES UR QL STRIP: NEGATIVE
LEUKOCYTE ESTERASE UR QL STRIP: NEGATIVE
LYMPHOCYTES # BLD AUTO: 1.8 K/UL (ref 1–4.8)
LYMPHOCYTES NFR BLD: 31.3 % (ref 18–48)
MAGNESIUM SERPL-MCNC: 2 MG/DL (ref 1.6–2.6)
MCH RBC QN AUTO: 28.6 PG (ref 27–31)
MCHC RBC AUTO-ENTMCNC: 32 G/DL (ref 32–36)
MCV RBC AUTO: 89 FL (ref 82–98)
MONOCYTES # BLD AUTO: 0.6 K/UL (ref 0.3–1)
MONOCYTES NFR BLD: 10.3 % (ref 4–15)
NEUTROPHILS # BLD AUTO: 3 K/UL (ref 1.8–7.7)
NEUTROPHILS NFR BLD: 52.6 % (ref 38–73)
NITRITE UR QL STRIP: NEGATIVE
NRBC BLD-RTO: 0 /100 WBC
PH UR STRIP: 6 [PH] (ref 5–8)
PLATELET # BLD AUTO: 262 K/UL (ref 150–450)
PMV BLD AUTO: 9.7 FL (ref 9.2–12.9)
POTASSIUM SERPL-SCNC: 4.1 MMOL/L (ref 3.5–5.1)
PROT SERPL-MCNC: 7.4 G/DL (ref 6–8.4)
PROT UR QL STRIP: NEGATIVE
RBC # BLD AUTO: 5.21 M/UL (ref 4–5.4)
SODIUM SERPL-SCNC: 140 MMOL/L (ref 136–145)
SP GR UR STRIP: 1.02 (ref 1–1.03)
URN SPEC COLLECT METH UR: NORMAL
WBC # BLD AUTO: 5.63 K/UL (ref 3.9–12.7)

## 2023-01-30 PROCEDURE — 3008F BODY MASS INDEX DOCD: CPT | Mod: CPTII,,, | Performed by: INTERNAL MEDICINE

## 2023-01-30 PROCEDURE — 99215 OFFICE O/P EST HI 40 MIN: CPT | Mod: S$PBB,,, | Performed by: INTERNAL MEDICINE

## 2023-01-30 PROCEDURE — 3078F PR MOST RECENT DIASTOLIC BLOOD PRESSURE < 80 MM HG: ICD-10-PCS | Mod: CPTII,,, | Performed by: INTERNAL MEDICINE

## 2023-01-30 PROCEDURE — 85025 COMPLETE CBC W/AUTO DIFF WBC: CPT | Mod: PN | Performed by: NURSE PRACTITIONER

## 2023-01-30 PROCEDURE — 99999 PR PBB SHADOW E&M-EST. PATIENT-LVL IV: ICD-10-PCS | Mod: PBBFAC,,, | Performed by: INTERNAL MEDICINE

## 2023-01-30 PROCEDURE — 99214 OFFICE O/P EST MOD 30 MIN: CPT | Mod: PBBFAC,PN | Performed by: INTERNAL MEDICINE

## 2023-01-30 PROCEDURE — 99215 PR OFFICE/OUTPT VISIT, EST, LEVL V, 40-54 MIN: ICD-10-PCS | Mod: S$PBB,,, | Performed by: INTERNAL MEDICINE

## 2023-01-30 PROCEDURE — 81003 URINALYSIS AUTO W/O SCOPE: CPT | Mod: PN | Performed by: NURSE PRACTITIONER

## 2023-01-30 PROCEDURE — 3074F PR MOST RECENT SYSTOLIC BLOOD PRESSURE < 130 MM HG: ICD-10-PCS | Mod: CPTII,,, | Performed by: INTERNAL MEDICINE

## 2023-01-30 PROCEDURE — 3074F SYST BP LT 130 MM HG: CPT | Mod: CPTII,,, | Performed by: INTERNAL MEDICINE

## 2023-01-30 PROCEDURE — 36415 COLL VENOUS BLD VENIPUNCTURE: CPT | Mod: PN | Performed by: NURSE PRACTITIONER

## 2023-01-30 PROCEDURE — 4010F PR ACE/ARB THEARPY RXD/TAKEN: ICD-10-PCS | Mod: CPTII,,, | Performed by: INTERNAL MEDICINE

## 2023-01-30 PROCEDURE — 83735 ASSAY OF MAGNESIUM: CPT | Mod: PN | Performed by: NURSE PRACTITIONER

## 2023-01-30 PROCEDURE — 80053 COMPREHEN METABOLIC PANEL: CPT | Mod: PN | Performed by: NURSE PRACTITIONER

## 2023-01-30 PROCEDURE — 3078F DIAST BP <80 MM HG: CPT | Mod: CPTII,,, | Performed by: INTERNAL MEDICINE

## 2023-01-30 PROCEDURE — 3008F PR BODY MASS INDEX (BMI) DOCUMENTED: ICD-10-PCS | Mod: CPTII,,, | Performed by: INTERNAL MEDICINE

## 2023-01-30 PROCEDURE — 4010F ACE/ARB THERAPY RXD/TAKEN: CPT | Mod: CPTII,,, | Performed by: INTERNAL MEDICINE

## 2023-01-30 PROCEDURE — 99999 PR PBB SHADOW E&M-EST. PATIENT-LVL IV: CPT | Mod: PBBFAC,,, | Performed by: INTERNAL MEDICINE

## 2023-01-30 RX ORDER — HEPARIN 100 UNIT/ML
500 SYRINGE INTRAVENOUS
Status: CANCELLED | OUTPATIENT
Start: 2023-02-01

## 2023-01-30 RX ORDER — HEPARIN 100 UNIT/ML
500 SYRINGE INTRAVENOUS
Status: CANCELLED | OUTPATIENT
Start: 2023-02-03

## 2023-01-30 RX ORDER — SODIUM CHLORIDE 0.9 % (FLUSH) 0.9 %
10 SYRINGE (ML) INJECTION
Status: CANCELLED | OUTPATIENT
Start: 2023-02-03

## 2023-01-30 RX ORDER — SODIUM CHLORIDE 0.9 % (FLUSH) 0.9 %
10 SYRINGE (ML) INJECTION
Status: CANCELLED | OUTPATIENT
Start: 2023-02-01

## 2023-01-30 RX ORDER — ATROPINE SULFATE 0.4 MG/ML
0.4 INJECTION, SOLUTION ENDOTRACHEAL; INTRAMEDULLARY; INTRAMUSCULAR; INTRAVENOUS; SUBCUTANEOUS ONCE AS NEEDED
Status: CANCELLED | OUTPATIENT
Start: 2023-02-01

## 2023-01-30 RX ORDER — LORAZEPAM 2 MG/ML
1 INJECTION INTRAMUSCULAR
Status: CANCELLED | OUTPATIENT
Start: 2023-02-01 | End: 2023-02-01

## 2023-01-30 RX ORDER — SODIUM CHLORIDE 9 MG/ML
1000 INJECTION, SOLUTION INTRAVENOUS
Status: CANCELLED | OUTPATIENT
Start: 2023-02-03

## 2023-01-30 NOTE — PROGRESS NOTES
PROGRESS NOTE    Subjective:       Patient ID: Gail Mckinnon is a 52 y.o. female.  MRN: [id number]  : 1970    Chief Complaint: Malignant neoplasm of sigmoid colon        History of Present Illness:   Gail Mckinnon is a 52 y.o. female who presents with colon cancer, initially stage III and now with LN recurrence.       She completed adjuvant FOLFOX in 2020. She tolerated only 4 cycles of FOLFOX before she developed an infusion reaction to oxaliplatin in cycle 5 was well as cycle 6. She then completed 6 cycles of infusional 5 FU.     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 She was offered second line therapy with FOLFIRI and Avastin.      She presented to the ED on  with left flank pain. CT renal stone study showed Moderate hydronephrosis on the left secondary to 3 mm calculus at the UPJ.    She had a PET scan mid April that showed possible progression of her disease with      She has been to [facility] for another opinion. No change was recommended in systemic therapy but she was offered surgical removal of the aorta caval nodes.  Recent sans at [facility]  show stable disease.      Surgery done 22. Received 2 more cycle of FOLFIRI without Avastin.     She had repeat imaging at [facility] on 22 that unfortunately showed disease progression since her surgery with multiple RP nodes and one mediastinal node.       PET 22  Impression:     1. Progression of disease with interval increase of abdominal and pelvic lymphadenopathy.  2. New area of moderate FDG uptake at the right half of the T9 vertebral body (image 124).  Favor degenerative disc changes.  No underlying osteolytic or osteoblastic lesion.  Recommend continued attention on follow-up.  3. No other evidence of FDG avid disease.     22  Impression After scan comparison:     1. Metastatic portacaval and left periaortic retroperitoneal lymph  nodes which remain stable between the CT of 09/24/2022 and the subsequent PET-CT of 12/08/2022.  There is no evidence of progression of metastatic disease.  2. Nonobstructing bilateral renal calculi and cholelithiasis.     Interim history:    She presents for a follow up visit. She has resumed FOLFIRI and Avastin and has had an 8 week follow up PET scan that showed stable disease. Scans have been compared here as well as at [facility].        Reports fatigue, intermittent nausea and anxiety.   She is following up with Dr. Palm.         Oncology History:  Oncology History   Malignant neoplasm of sigmoid colon   3/16/2020 Initial Diagnosis    Malignant neoplasm of sigmoid colon     3/31/2020 Cancer Staged    Staging form: Colon and Rectum, AJCC 8th Edition  - Clinical stage from 3/31/2020: Stage IIIC (cT4b, cN2a, cM0)       5/6/2020 - 11/17/2020 Chemotherapy    Treatment Summary   Plan Name: OP FOLFOX 6 Q2W  Treatment Goal: Curative  Status: Inactive  Start Date: 5/6/2020  End Date: 11/6/2020  Provider: Dylan Leyva MD  Chemotherapy: fluorouraciL injection 945 mg, 400 mg/m2 = 945 mg, Intravenous, Clinic/[facility] 1 time, 14 of 14 cycles  Administration: 945 mg (5/6/2020), 945 mg (5/20/2020), 945 mg (6/3/2020), 945 mg (6/17/2020), 945 mg (7/29/2020), 945 mg (8/12/2020), 945 mg (8/26/2020), 945 mg (9/9/2020), 945 mg (9/23/2020), 945 mg (10/6/2020), 945 mg (10/21/2020), 945 mg (11/4/2020)  fluorouraciL 2,400 mg/m2 = 5,665 mg in sodium chloride 0.9% 240 mL chemo infusion, 2,400 mg/m2 = 5,665 mg, Intravenous, Over 46 hours, 14 of 14 cycles  Administration: 5,665 mg (5/6/2020), 5,665 mg (5/20/2020), 5,665 mg (6/3/2020), 5,665 mg (6/17/2020), 5,665 mg (7/29/2020), 5,665 mg (8/12/2020), 5,665 mg (8/26/2020), 5,665 mg (9/9/2020), 5,665 mg (9/23/2020), 5,665 mg (10/6/2020), 5,665 mg (10/21/2020), 5,665 mg (11/4/2020)  leucovorin calcium 900 mg in dextrose 5 % 250 mL infusion, 945 mg, Intravenous, [facility] 1 time, 14 of 14  cycles  Administration: 900 mg (5/6/2020), 900 mg (5/20/2020), 900 mg (6/3/2020), 900 mg (6/17/2020), 945 mg (6/30/2020), 945 mg (7/20/2020), 945 mg (7/29/2020), 945 mg (8/12/2020), 945 mg (8/26/2020), 945 mg (9/9/2020), 945 mg (9/23/2020), 945 mg (10/6/2020), 945 mg (10/21/2020), 945 mg (11/4/2020)  oxaliplatin (ELOXATIN) 200 mg in dextrose 5 % 500 mL chemo infusion, 201 mg, Intravenous, Clinic/[facility] 1 time, 6 of 6 cycles  Dose modification: 65 mg/m2 (original dose 85 mg/m2, Cycle 6)  Administration: 200 mg (5/6/2020), 200 mg (5/20/2020), 200 mg (6/3/2020), 200 mg (6/17/2020), 201 mg (6/30/2020), 150 mg (7/20/2020)       6/16/2021 -  Chemotherapy    Treatment Summary   Plan Name: OP COLORECTAL FOLFIRI + BEVACIZUMAB Q2W  Treatment Goal: Control  Status: Active  Start Date: 6/16/2021  End Date: 5/12/2023 (Planned)  Provider: Marah Santo MD  Chemotherapy: fluorouraciL injection 990 mg, 400 mg/m2 = 990 mg, Intravenous, Clinic/[facility] 1 time, 15 of 15 cycles  Administration: 990 mg (6/16/2021), 990 mg (6/30/2021), 990 mg (7/14/2021), 980 mg (7/28/2021), 990 mg (8/11/2021), 990 mg (8/25/2021), 990 mg (9/8/2021), 990 mg (9/22/2021), 990 mg (10/6/2021), 990 mg (10/20/2021), 990 mg (11/3/2021), 990 mg (12/1/2021), 990 mg (12/15/2021), 990 mg (11/17/2021), 990 mg (12/28/2021)  fluorouraciL 2,400 mg/m2 = 5,950 mg in sodium chloride 0.9% 240 mL chemo infusion, 2,400 mg/m2 = 5,950 mg, Intravenous, Over 46 hours, 33 of 41 cycles  Administration: 5,950 mg (6/16/2021), 5,950 mg (6/30/2021), 5,950 mg (7/14/2021), 5,880 mg (7/28/2021), 5,930 mg (8/11/2021), 5,930 mg (8/25/2021), 5,930 mg (9/8/2021), 5,930 mg (9/22/2021), 5,930 mg (10/6/2021), 5,930 mg (10/20/2021), 5,930 mg (11/3/2021), 5,930 mg (12/1/2021), 5,930 mg (12/15/2021), 5,930 mg (11/17/2021), 5,930 mg (12/28/2021), 6,050 mg (5/11/2022), 6,025 mg (3/9/2022), 6,025 mg (2/23/2022), 5,930 mg (2/2/2022), 5,930 mg (1/19/2022), 6,050 mg (4/20/2022), 6,025 mg (4/6/2022), 6,025 mg  (3/23/2022), 6,050 mg (6/1/2022), 6,050 mg (6/15/2022), 6,050 mg (10/19/2022), 6,050 mg (10/5/2022), 6,050 mg (11/2/2022), 6,050 mg (11/16/2022), 6,050 mg (11/30/2022), 6,050 mg (1/4/2023), 6,050 mg (12/19/2022), 6,050 mg (1/18/2023)  bevacizumab (AVASTIN) 600 mg in sodium chloride 0.9% 100 mL chemo infusion, 660 mg, Intravenous, [facility] 1 time, 31 of 39 cycles  Dose modification: 5 mg/kg (original dose 5 mg/kg, Cycle 26, Reason: MD Discretion, Comment: 5 mg/kg original dose)  Administration: 600 mg (6/30/2021), 600 mg (7/14/2021), 600 mg (7/28/2021), 600 mg (6/16/2021), 600 mg (8/11/2021), 655 mg (8/25/2021), 600 mg (9/8/2021), 600 mg (9/22/2021), 600 mg (10/6/2021), 600 mg (10/20/2021), 600 mg (11/3/2021), 655 mg (12/1/2021), 600 mg (12/15/2021), 600 mg (11/17/2021), 600 mg (12/28/2021), 600 mg (5/11/2022), 600 mg (3/9/2022), 600 mg (2/23/2022), 600 mg (2/2/2022), 600 mg (1/19/2022), 600 mg (4/20/2022), 680 mg (4/6/2022), 600 mg (3/23/2022), 680 mg (10/5/2022), 680 mg (10/19/2022), 680 mg (11/2/2022), 680 mg (11/16/2022), 680 mg (11/30/2022), 680 mg (1/4/2023), 680 mg (12/19/2022), 680 mg (1/18/2023)  irinotecan (CAMPTOSAR) 440 mg in sodium chloride 0.9% 500 mL chemo infusion, 446 mg, Intravenous, [facility] 1 time, 33 of 41 cycles  Dose modification: 180 mg/m2 (original dose 180 mg/m2, Cycle 24)  Administration: 440 mg (6/16/2021), 440 mg (6/30/2021), 440 mg (7/14/2021), 440 mg (7/28/2021), 440 mg (8/11/2021), 444 mg (8/25/2021), 440 mg (9/8/2021), 440 mg (9/22/2021), 440 mg (10/6/2021), 440 mg (10/20/2021), 440 mg (11/3/2021), 440 mg (12/1/2021), 440 mg (12/15/2021), 440 mg (11/17/2021), 440 mg (12/28/2021), 440 mg (5/11/2022), 440 mg (3/9/2022), 440 mg (2/23/2022), 440 mg (2/2/2022), 440 mg (1/19/2022), 440 mg (4/20/2022), 440 mg (4/6/2022), 440 mg (3/24/2022), 440 mg (6/1/2022), 440 mg (6/15/2022), 440 mg (10/19/2022), 440 mg (10/5/2022), 440 mg (11/2/2022), 440 mg (11/16/2022), 440 mg (11/30/2022), 440 mg  (1/4/2023), 440 mg (12/19/2022), 440 mg (1/18/2023)       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   4/3/2020 Initial Diagnosis    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     5/6/2020 - 11/17/2020 Chemotherapy    Treatment Summary   Plan Name: OP FOLFOX 6 Q2W  Treatment Goal: Curative  Status: Inactive  Start Date: 5/6/2020  End Date: 11/6/2020  Provider: Dylan Leyva MD  Chemotherapy: fluorouraciL injection 945 mg, 400 mg/m2 = 945 mg, Intravenous, Clinic/[facility] 1 time, 14 of 14 cycles  Administration: 945 mg (5/6/2020), 945 mg (5/20/2020), 945 mg (6/3/2020), 945 mg (6/17/2020), 945 mg (7/29/2020), 945 mg (8/12/2020), 945 mg (8/26/2020), 945 mg (9/9/2020), 945 mg (9/23/2020), 945 mg (10/6/2020), 945 mg (10/21/2020), 945 mg (11/4/2020)  fluorouraciL 2,400 mg/m2 = 5,665 mg in sodium chloride 0.9% 240 mL chemo infusion, 2,400 mg/m2 = 5,665 mg, Intravenous, Over 46 hours, 14 of 14 cycles  Administration: 5,665 mg (5/6/2020), 5,665 mg (5/20/2020), 5,665 mg (6/3/2020), 5,665 mg (6/17/2020), 5,665 mg (7/29/2020), 5,665 mg (8/12/2020), 5,665 mg (8/26/2020), 5,665 mg (9/9/2020), 5,665 mg (9/23/2020), 5,665 mg (10/6/2020), 5,665 mg (10/21/2020), 5,665 mg (11/4/2020)  leucovorin calcium 900 mg in dextrose 5 % 250 mL infusion, 945 mg, Intravenous, Clinic/[facility] 1 time, 14 of 14 cycles  Administration: 900 mg (5/6/2020), 900 mg (5/20/2020), 900 mg (6/3/2020), 900 mg (6/17/2020), 945 mg (6/30/2020), 945 mg (7/20/2020), 945 mg (7/29/2020), 945 mg (8/12/2020), 945 mg (8/26/2020), 945 mg (9/9/2020), 945 mg (9/23/2020), 945 mg (10/6/2020), 945 mg (10/21/2020), 945 mg (11/4/2020)  oxaliplatin (ELOXATIN) 200 mg in dextrose 5 % 500 mL chemo infusion, 201 mg, Intravenous, Clinic/[facility] 1 time, 6 of 6 cycles  Dose modification: 65 mg/m2 (original dose 85 mg/m2, Cycle 6)  Administration: 200 mg (5/6/2020), 200 mg (5/20/2020), 200 mg (6/3/2020), 200 mg (6/17/2020), 201 mg (6/30/2020), 150 mg (7/20/2020)       6/16/2021 -  Chemotherapy    Treatment  Summary   Plan Name: OP COLORECTAL FOLFIRI + BEVACIZUMAB Q2W  Treatment Goal: Control  Status: Active  Start Date: 6/16/2021  End Date: 5/12/2023 (Planned)  Provider: Marah Santo MD  Chemotherapy: fluorouraciL injection 990 mg, 400 mg/m2 = 990 mg, Intravenous, Clinic/[facility] 1 time, 15 of 15 cycles  Administration: 990 mg (6/16/2021), 990 mg (6/30/2021), 990 mg (7/14/2021), 980 mg (7/28/2021), 990 mg (8/11/2021), 990 mg (8/25/2021), 990 mg (9/8/2021), 990 mg (9/22/2021), 990 mg (10/6/2021), 990 mg (10/20/2021), 990 mg (11/3/2021), 990 mg (12/1/2021), 990 mg (12/15/2021), 990 mg (11/17/2021), 990 mg (12/28/2021)  fluorouraciL 2,400 mg/m2 = 5,950 mg in sodium chloride 0.9% 240 mL chemo infusion, 2,400 mg/m2 = 5,950 mg, Intravenous, Over 46 hours, 33 of 41 cycles  Administration: 5,950 mg (6/16/2021), 5,950 mg (6/30/2021), 5,950 mg (7/14/2021), 5,880 mg (7/28/2021), 5,930 mg (8/11/2021), 5,930 mg (8/25/2021), 5,930 mg (9/8/2021), 5,930 mg (9/22/2021), 5,930 mg (10/6/2021), 5,930 mg (10/20/2021), 5,930 mg (11/3/2021), 5,930 mg (12/1/2021), 5,930 mg (12/15/2021), 5,930 mg (11/17/2021), 5,930 mg (12/28/2021), 6,050 mg (5/11/2022), 6,025 mg (3/9/2022), 6,025 mg (2/23/2022), 5,930 mg (2/2/2022), 5,930 mg (1/19/2022), 6,050 mg (4/20/2022), 6,025 mg (4/6/2022), 6,025 mg (3/23/2022), 6,050 mg (6/1/2022), 6,050 mg (6/15/2022), 6,050 mg (10/19/2022), 6,050 mg (10/5/2022), 6,050 mg (11/2/2022), 6,050 mg (11/16/2022), 6,050 mg (11/30/2022), 6,050 mg (1/4/2023), 6,050 mg (12/19/2022), 6,050 mg (1/18/2023)  bevacizumab (AVASTIN) 600 mg in sodium chloride 0.9% 100 mL chemo infusion, 660 mg, Intravenous, [facility] 1 time, 31 of 39 cycles  Dose modification: 5 mg/kg (original dose 5 mg/kg, Cycle 26, Reason: MD Discretion, Comment: 5 mg/kg original dose)  Administration: 600 mg (6/30/2021), 600 mg (7/14/2021), 600 mg (7/28/2021), 600 mg (6/16/2021), 600 mg (8/11/2021), 655 mg (8/25/2021), 600 mg (9/8/2021), 600 mg (9/22/2021), 600 mg  (10/6/2021), 600 mg (10/20/2021), 600 mg (11/3/2021), 655 mg (2021), 600 mg (12/15/2021), 600 mg (2021), 600 mg (2021), 600 mg (2022), 600 mg (3/9/2022), 600 mg (2022), 600 mg (2022), 600 mg (2022), 600 mg (2022), 680 mg (2022), 600 mg (3/23/2022), 680 mg (10/5/2022), 680 mg (10/19/2022), 680 mg (2022), 680 mg (2022), 680 mg (2022), 680 mg (2023), 680 mg (2022), 680 mg (2023)  irinotecan (CAMPTOSAR) 440 mg in sodium chloride 0.9% 500 mL chemo infusion, 446 mg, Intravenous, [facility]\"" 1 time, 33 of 41 cycles  Dose modification: 180 mg/m2 (original dose 180 mg/m2, Cycle 24)  Administration: 440 mg (2021), 440 mg (2021), 440 mg (2021), 440 mg (2021), 440 mg (2021), 444 mg (2021), 440 mg (2021), 440 mg (2021), 440 mg (10/6/2021), 440 mg (10/20/2021), 440 mg (11/3/2021), 440 mg (2021), 440 mg (12/15/2021), 440 mg (2021), 440 mg (2021), 440 mg (2022), 440 mg (3/9/2022), 440 mg (2022), 440 mg (2022), 440 mg (2022), 440 mg (2022), 440 mg (2022), 440 mg (3/24/2022), 440 mg (2022), 440 mg (6/15/2022), 440 mg (10/19/2022), 440 mg (10/5/2022), 440 mg (2022), 440 mg (2022), 440 mg (2022), 440 mg (2023), 440 mg (2022), 440 mg (2023)           History:  Past Medical History:   Diagnosis Date    Anxiety     Depression     FH: ovarian cancer 3/16/2020    Hx of psychiatric care     Effexor, Paxil, Lexapro, Zoloft, Wellbutrin, Trazodone Buspar    Hyperthyroidism     Hypothyroid     Kidney calculi     Malignant neoplasm of sigmoid colon 3/16/2020    Menorrhagia     Multinodular goiter 2012    Palpitation     Psychiatric problem     Venous insufficiency       Past Surgical History:   Procedure Laterality Date     SECTION, CLASSIC      x3    COLONOSCOPY N/A 2020    Procedure: COLONOSCOPY;  Surgeon: Shane Parker MD;   Location: [facility] ENDO;  Service: Endoscopy;  Laterality: N/A;    COLONOSCOPY N/A 6/21/2022    Procedure: COLONOSCOPY;  Surgeon: Shane Parker MD;  Location: [facility] ENDO;  Service: Endoscopy;  Laterality: N/A;    CYSTOSCOPY W/ URETERAL STENT PLACEMENT Bilateral 3/25/2020    Procedure: CYSTOSCOPY, WITH URETERAL STENT INSERTION;  Surgeon: Claudio Tyson MD;  Location: [facility] OR [facility] FLR;  Service: Urology;  Laterality: Bilateral;    INSERTION OF TUNNELED CENTRAL VENOUS CATHETER (CVC) WITH SUBCUTANEOUS PORT N/A 5/1/2020    Procedure: [id number];  Surgeon: [facility] Diagnostic Provider;  Location: [facility] OR 2ND FLR;  Service: Radiology;  Laterality: N/A;  Room 189/Cindy    LAPAROSCOPIC SIGMOIDECTOMY N/A 3/25/2020    Procedure: COLECTOMY, SIGMOID, LAPAROSCOPIC, flex sig, ERAS high;  Surgeon: Silvio Man MD;  Location: [facility] OR [facility] FLR;  Service: Colon and Rectal;  Laterality: N/A;    MOBILIZATION OF SPLENIC FLEXURE  3/25/2020    Procedure: MOBILIZATION, SPLENIC FLEXURE;  Surgeon: Silvio Man MD;  Location: [facility] OR 2ND FLR;  Service: Colon and Rectal;;    tonsillectomy      TOTAL ABDOMINAL HYSTERECTOMY W/ BILATERAL SALPINGOOPHORECTOMY N/A 3/25/2020    Procedure: HYSTERECTOMY, TOTAL, ABDOMINAL, WITH BILATERAL SALPINGO-OOPHORECTOMY;  Surgeon: Keron Brady MD;  Location: [facility] OR 2ND FLR;  Service: Oncology;  Laterality: N/A;     Family History   Problem Relation Age of Onset    Heart disease Father     Diabetes Mother 65    Drug abuse Brother     Drug abuse Brother     Cancer Maternal Aunt         lung cancer    Cancer Maternal Grandmother         stomach cancer- started in ovaries    Ovarian cancer Maternal Grandmother         stomach cancer- started in ovaries    Colon cancer Paternal Uncle     Cancer Paternal Uncle     Ovarian cancer Maternal Aunt     Ovarian cancer Maternal Aunt     Ovarian cancer Cousin         mother had ovarian cancer    Drug abuse Son     Drug abuse Son         clean/sober  "since 2012    Colon cancer Son     No Known Problems Daughter     Uterine cancer Neg Hx     Breast cancer Neg Hx      Social History     Tobacco Use    Smoking status: Never    Smokeless tobacco: Never   Substance and Sexual Activity    Alcohol use: Yes     Comment: occasionally- twice monthly    Drug use: Never    Sexual activity: Yes     Partners: Male     Birth control/protection: See Surgical Hx        ROS:   Review of Systems   Constitutional:  Positive for malaise/fatigue (first few days). Negative for fever and weight loss.   HENT:  Negative for congestion, hearing loss, nosebleeds and sore throat.    Eyes:  Negative for double vision and photophobia.   Respiratory:  Negative for cough, hemoptysis, sputum production, shortness of breath and wheezing.    Cardiovascular:  Negative for chest pain, palpitations, orthopnea and leg swelling.   Gastrointestinal:  Positive for nausea (improving). Negative for abdominal pain, blood in stool, constipation, diarrhea, heartburn and vomiting.   Genitourinary:  Negative for dysuria, frequency, hematuria and urgency.   Musculoskeletal:  Negative for back pain, joint pain and myalgias.   Skin:  Negative for itching and rash.   Neurological:  Negative for dizziness, tingling, seizures and weakness.   Endo/Heme/Allergies:  Negative for polydipsia. Does not bruise/bleed easily.   Psychiatric/Behavioral:  Negative for depression and memory loss. The patient is nervous/anxious. The patient does not have insomnia.       Objective:     Vitals:    01/30/23 1432   BP: 98/60   Pulse: (!) 112   Resp: 18   Temp: 97.5 °F (36.4 °C)   TempSrc: Temporal   SpO2: 97%   Weight: (!) 136.2 kg (300 lb 4.3 oz)   Height: 5' 6" (1.676 m)   PainSc: 0-No pain               Wt Readings from Last 10 Encounters:   01/30/23 (!) 136.2 kg (300 lb 4.3 oz)   01/20/23 (!) 137.5 kg (303 lb 2.1 oz)   01/18/23 (!) 137.5 kg (303 lb 2.1 oz)   01/18/23 (!) 137.5 kg (303 lb 2.1 oz)   01/04/23 134.5 kg (296 lb 8.3 oz) "   01/04/23 134.5 kg (296 lb 8.3 oz)   12/21/22 135.3 kg (298 lb 4.5 oz)   12/19/22 135.3 kg (298 lb 4.5 oz)   12/19/22 135.3 kg (298 lb 4.5 oz)   12/12/22 134.6 kg (296 lb 11.8 oz)       Physical Examination:   Physical Exam  Vitals and nursing note reviewed.   Constitutional:       General: She is not in acute distress.     Appearance: She is not diaphoretic.   HENT:      Head: Normocephalic.      Mouth/Throat:      Pharynx: No oropharyngeal exudate.   Eyes:      General: No scleral icterus.     Conjunctiva/sclera: Conjunctivae normal.   Neck:      Thyroid: No thyromegaly.   Cardiovascular:      Rate and Rhythm: Normal rate and regular rhythm.      Heart sounds: Normal heart sounds. No murmur heard.  Pulmonary:      Effort: Pulmonary effort is normal. No respiratory distress.      Breath sounds: No stridor. No wheezing or rales.   Chest:      Chest wall: No tenderness.   Abdominal:      General: Bowel sounds are normal. There is no distension.      Palpations: Abdomen is soft. There is no mass.      Tenderness: There is no abdominal tenderness. There is no rebound.   Musculoskeletal:         General: No tenderness or deformity. Normal range of motion.      Cervical back: Neck supple.   Lymphadenopathy:      Cervical: No cervical adenopathy.   Skin:     General: Skin is warm and dry.      Findings: No erythema or rash.   Neurological:      Mental Status: She is alert and oriented to person, place, and time.      Cranial Nerves: No cranial nerve deficit.      Coordination: Coordination normal.      Gait: Gait is intact.   Psychiatric:         Mood and Affect: Affect normal.         Cognition and Memory: Memory normal.         Judgment: Judgment normal.        Diagnostic Tests:  Significant Imaging: I have reviewed and interpreted all pertinent imaging results/findings.  Laboratory Data:  All pertinent labs have been reviewed.  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 Oxaliplatin was stopped due to severe adverse reaction.     Follow-up CTs and PET in May 2021 showed enlarging retroperitoneal node, concerning for metastatic disease.      She started FOLFIRI + Avastin and has continued till July 2022.   Given isolated RP toni disease, she has undergone open Left periaortic and aortocaval lymph node dissection on 7/12/2022.     Follow up scans show disease progression. I have discussed the case with Dr. Montelongo at [facility]. We discussed resuming FOLFIRI-debo vs transitioning to BRAF directed therapy (BEACON).   There may be an option ton enroll in SWOG 2107 (encorafenib/cetuximab +/- nivo) at [facility] if no prior BRAF inhibitor exposure.[facility] is working with the patient's insurance to see if she can be enrolled there.      Patient is interested in the trial and therefore we resumed FOLFIRI- Debo. 8 week interval scans are stable. Repeat scans prior to next visit.      If she has progressive disease and unable to got to [facility] and trial not available locally, will treat with next line treatment with encorafenib and cetuximab.     Nausea   Improving on compazine.     Mucositis   Continue [facility] soln.     Transaminitis  Fluctuates.  Continue to monitor. No obvious liver mets.     Hypercalcemia   Mild, secondary to hyperparathyroidism.  Avoid calcium supplements.      Hypothyroidism  Multinodular goiter   Continue Levothyroxine. Endocrine consult is appreciated.   Had a non diagnostic biopsy in 2012, usg findings have remained stable. However, given uptake on PET this year, an FNA vs repeat usg in 6 months is recommended. Will follow closely.     Essential HTN   Continue antihypertensives.      Anxiety   Continue to  follow up with Dr. Palm.     MDM includes  :    - Acute or chronic illness or injury that poses a threat to life or bodily function  - Independent review and explanation of 3+ results from unique tests  - Discussion of management and ordering 3+ unique tests  - Extensive discussion of treatment and management  - Prescription drug management  - Drug therapy requiring intensive monitoring for toxicity          ECOG SCORE               Discussion:   No follow-ups on file.    Plan was discussed with the patient at length, and she verbalized understanding. Gail was given an opportunity to ask questions that were answered to her satisfaction, and she was advised to call in the interval if any problems or questions arise.    Electronically signed by Marah Santo MD        Route Chart for Scheduling    Med Onc Chart Routing  Urgent    Follow up with physician . 2/13/23 3 pm slot please   Follow up with TAVO    Infusion scheduling note    Injection scheduling note    Labs CBC, CMP and magnesium   Lab interval: every 2 weeks  urinalysis 2/13   Imaging CT chest abdomen pelvis   2/13/23 am   Pharmacy appointment    Other referrals        Treatment Plan Information   OP COLORECTAL FOLFIRI + BEVACIZUMAB Q2W   Marah Santo MD   Upcoming Treatment Dates - OP COLORECTAL FOLFIRI + BEVACIZUMAB Q2W    2/1/2023       Pre-Medications       LORazepam injection 1 mg       promethazine (PHENERGAN) 6.25 mg in dextrose 5 % 100 mL IVPB       palonosetron 0.25mg/dexAMETHasone 20mg in NS IVPB       Chemotherapy       bevacizumab (AVASTIN) 5 mg/kg = 680 mg in sodium chloride 0.9% 100  mL chemo infusion       irinotecan (CAMPTOSAR) 440 mg in sodium chloride 0.9% 522 mL chemo infusion       leucovorin calcium 400 mg/m2 = 1,010 mg in dextrose 5 % 250 mL infusion       fluorouracil (ADRUCIL) 2,400 mg/m2 = 6,050 mg in sodium chloride 0.9% 240 mL chemo infusion       Supportive Care       atropine injection 0.4 mg  2/15/2023       Pre-Medications       LORazepam injection 1 mg       promethazine (PHENERGAN) 6.25 mg in dextrose 5 % 100 mL IVPB       palonosetron 0.25mg/dexAMETHasone 20mg in NS IVPB       Chemotherapy       bevacizumab (AVASTIN) 5 mg/kg = 680 mg in sodium chloride 0.9% 100 mL chemo infusion       irinotecan (CAMPTOSAR) 440 mg in sodium chloride 0.9% 522 mL chemo infusion       leucovorin calcium 400 mg/m2 = 1,010 mg in dextrose 5 % 250 mL infusion       fluorouracil (ADRUCIL) 2,400 mg/m2 = 6,050 mg in sodium chloride 0.9% 240 mL chemo infusion       Supportive Care       atropine injection 0.4 mg  3/1/2023       Pre-Medications       LORazepam injection 1 mg       promethazine (PHENERGAN) 6.25 mg in dextrose 5 % 100 mL IVPB       palonosetron 0.25mg/dexAMETHasone 20mg in NS IVPB       Chemotherapy       bevacizumab (AVASTIN) 5 mg/kg = 680 mg in sodium chloride 0.9% 100 mL chemo infusion       irinotecan (CAMPTOSAR) 440 mg in sodium chloride 0.9% 522 mL chemo infusion       leucovorin calcium 400 mg/m2 = 1,010 mg in dextrose 5 % 250 mL infusion       fluorouracil (ADRUCIL) 2,400 mg/m2 = 6,050 mg in sodium chloride 0.9% 240 mL chemo infusion       Supportive Care       atropine injection 0.4 mg  3/15/2023       Pre-Medications       LORazepam injection 1 mg       promethazine (PHENERGAN) 6.25 mg in dextrose 5 % 100 mL IVPB       palonosetron 0.25mg/dexAMETHasone 20mg in NS IVPB       Chemotherapy       bevacizumab (AVASTIN) 5 mg/kg = 680 mg in sodium chloride 0.9% 100 mL chemo infusion       irinotecan (CAMPTOSAR) 440 mg in sodium chloride 0.9% 522 mL chemo infusion       leucovorin  calcium 400 mg/m2 = 1,010 mg in dextrose 5 % 250 mL infusion       fluorouracil (ADRUCIL) 2,400 mg/m2 = 6,050 mg in sodium chloride 0.9% 240 mL chemo infusion       Supportive Care       atropine injection 0.4 mg    Supportive Plan Information  IV FLUIDS AND ELECTROLYTES   Brayden Solo MD   Upcoming Treatment Dates - IV FLUIDS AND ELECTROLYTES    No upcoming days in selected categories.    Therapy Plan Information  PORT FLUSH  Flushes  heparin, porcine (PF) 100 unit/mL injection flush 500 Units  500 Units, Intravenous, Every visit  sodium chloride 0.9% flush 10 mL  10 mL, Intravenous, Every visit

## 2023-02-01 ENCOUNTER — INFUSION (OUTPATIENT)
Dept: INFUSION THERAPY | Facility: HOSPITAL | Age: 53
End: 2023-02-01
Attending: INTERNAL MEDICINE
Payer: MEDICAID

## 2023-02-01 ENCOUNTER — DOCUMENTATION ONLY (OUTPATIENT)
Dept: INFUSION THERAPY | Facility: HOSPITAL | Age: 53
End: 2023-02-01

## 2023-02-01 VITALS
SYSTOLIC BLOOD PRESSURE: 145 MMHG | HEART RATE: 99 BPM | DIASTOLIC BLOOD PRESSURE: 82 MMHG | RESPIRATION RATE: 18 BRPM | WEIGHT: 293 LBS | BODY MASS INDEX: 47.09 KG/M2 | TEMPERATURE: 98 F | HEIGHT: 66 IN

## 2023-02-01 DIAGNOSIS — C18.7 MALIGNANT NEOPLASM OF SIGMOID COLON: ICD-10-CM

## 2023-02-01 DIAGNOSIS — C77.2 METASTASIS TO INTESTINAL LYMPH NODE: Primary | ICD-10-CM

## 2023-02-01 PROCEDURE — 96368 THER/DIAG CONCURRENT INF: CPT | Mod: PN

## 2023-02-01 PROCEDURE — 96375 TX/PRO/DX INJ NEW DRUG ADDON: CPT | Mod: PN

## 2023-02-01 PROCEDURE — 96416 CHEMO PROLONG INFUSE W/PUMP: CPT | Mod: PN

## 2023-02-01 PROCEDURE — 96413 CHEMO IV INFUSION 1 HR: CPT | Mod: PN

## 2023-02-01 PROCEDURE — 96367 TX/PROPH/DG ADDL SEQ IV INF: CPT | Mod: PN

## 2023-02-01 PROCEDURE — 25000003 PHARM REV CODE 250: Mod: PN | Performed by: INTERNAL MEDICINE

## 2023-02-01 PROCEDURE — 96417 CHEMO IV INFUS EACH ADDL SEQ: CPT | Mod: PN

## 2023-02-01 PROCEDURE — 96415 CHEMO IV INFUSION ADDL HR: CPT | Mod: PN

## 2023-02-01 PROCEDURE — 63600175 PHARM REV CODE 636 W HCPCS: Mod: PN | Performed by: INTERNAL MEDICINE

## 2023-02-01 RX ORDER — LORAZEPAM 2 MG/ML
1 INJECTION INTRAMUSCULAR
Status: COMPLETED | OUTPATIENT
Start: 2023-02-01 | End: 2023-02-01

## 2023-02-01 RX ORDER — SODIUM CHLORIDE 0.9 % (FLUSH) 0.9 %
10 SYRINGE (ML) INJECTION
Status: DISCONTINUED | OUTPATIENT
Start: 2023-02-01 | End: 2023-02-01 | Stop reason: HOSPADM

## 2023-02-01 RX ORDER — HEPARIN 100 UNIT/ML
500 SYRINGE INTRAVENOUS
Status: DISCONTINUED | OUTPATIENT
Start: 2023-02-01 | End: 2023-02-01 | Stop reason: HOSPADM

## 2023-02-01 RX ORDER — ATROPINE SULFATE 0.4 MG/ML
0.4 INJECTION, SOLUTION ENDOTRACHEAL; INTRAMEDULLARY; INTRAMUSCULAR; INTRAVENOUS; SUBCUTANEOUS ONCE AS NEEDED
Status: COMPLETED | OUTPATIENT
Start: 2023-02-01 | End: 2023-02-01

## 2023-02-01 RX ADMIN — SODIUM CHLORIDE: 9 INJECTION, SOLUTION INTRAVENOUS at 01:02

## 2023-02-01 RX ADMIN — FLUOROURACIL 6000 MG: 50 INJECTION, SOLUTION INTRAVENOUS at 05:02

## 2023-02-01 RX ADMIN — PROMETHAZINE HYDROCHLORIDE 6.25 MG: 25 INJECTION INTRAMUSCULAR; INTRAVENOUS at 02:02

## 2023-02-01 RX ADMIN — PALONOSETRON 0.25 MG: 0.05 INJECTION, SOLUTION INTRAVENOUS at 01:02

## 2023-02-01 RX ADMIN — LEUCOVORIN CALCIUM 1010 MG: 500 INJECTION, POWDER, LYOPHILIZED, FOR SOLUTION INTRAMUSCULAR; INTRAVENOUS at 03:02

## 2023-02-01 RX ADMIN — ATROPINE SULFATE 0.4 MG: 0.4 INJECTION, SOLUTION INTRAVENOUS at 03:02

## 2023-02-01 RX ADMIN — LORAZEPAM 1 MG: 2 INJECTION INTRAMUSCULAR; INTRAVENOUS at 01:02

## 2023-02-01 RX ADMIN — SODIUM CHLORIDE 440 MG: 9 INJECTION, SOLUTION INTRAVENOUS at 03:02

## 2023-02-01 RX ADMIN — BEVACIZUMAB 680 MG: 400 INJECTION, SOLUTION INTRAVENOUS at 03:02

## 2023-02-01 NOTE — PLAN OF CARE
Patient tolerated Avastin, Irinotecan, Leucovorin well. Placed on home pump for 5fu. Blood return noted prior to pump connection, all connections secured with coban, pt has spill kit and tip sheet at home from previous infusions. Verified pump infusing prior to d/c.

## 2023-02-01 NOTE — PROGRESS NOTES
Oncology Nutrition   Gail Mckinnon   1970    [employer]     Pt eligible for [employer] . Order filled out with patient at chairside. All patient questions or concerns regarding  and/or nutrition status addressed. RD to continue to monitor prn and provide patient food while under active treatment.     Food order filled by this RD- will provide to patient at end of infusion today.    Ciera Richardson, DAVIDN, LDN  02/01/2023  3:26 PM

## 2023-02-02 ENCOUNTER — DOCUMENTATION ONLY (OUTPATIENT)
Dept: INFUSION THERAPY | Facility: HOSPITAL | Age: 53
End: 2023-02-02
Payer: MEDICAID

## 2023-02-02 NOTE — PROGRESS NOTES
Late entry for 2/1/21    SW met with pt at chairside. Pt has been working daily to look for and access services for help with rent, moving cost and other expenses. She has signed up for several roshan and has received some small awards. SW did provide pt with information on [facility] as another place to connect with for additional information. SW educated on [facility] and [facility]. Pt will contact these agencies via phone.   Pt is still looking for an apartment for her and  her daughter to move to 3/1/23. He current landlord is letting her stay until then.   Pt voiced how stressful it can be looking for housing. JULIANNE provided support and will continue to work with pt for support and resources as available.     Radha Giordano, JENNIEW

## 2023-02-03 ENCOUNTER — INFUSION (OUTPATIENT)
Dept: INFUSION THERAPY | Facility: HOSPITAL | Age: 53
End: 2023-02-03
Attending: INTERNAL MEDICINE
Payer: MEDICAID

## 2023-02-03 VITALS
RESPIRATION RATE: 18 BRPM | SYSTOLIC BLOOD PRESSURE: 126 MMHG | BODY MASS INDEX: 49.18 KG/M2 | DIASTOLIC BLOOD PRESSURE: 72 MMHG | HEART RATE: 74 BPM | WEIGHT: 293 LBS | TEMPERATURE: 98 F

## 2023-02-03 DIAGNOSIS — C77.2 METASTASIS TO INTESTINAL LYMPH NODE: Primary | ICD-10-CM

## 2023-02-03 DIAGNOSIS — C18.7 MALIGNANT NEOPLASM OF SIGMOID COLON: ICD-10-CM

## 2023-02-03 PROCEDURE — 25000003 PHARM REV CODE 250: Mod: PN | Performed by: INTERNAL MEDICINE

## 2023-02-03 PROCEDURE — A4216 STERILE WATER/SALINE, 10 ML: HCPCS | Mod: PN | Performed by: INTERNAL MEDICINE

## 2023-02-03 PROCEDURE — 63600175 PHARM REV CODE 636 W HCPCS: Mod: PN | Performed by: INTERNAL MEDICINE

## 2023-02-03 PROCEDURE — 96360 HYDRATION IV INFUSION INIT: CPT | Mod: PN

## 2023-02-03 RX ORDER — HEPARIN 100 UNIT/ML
500 SYRINGE INTRAVENOUS
Status: DISCONTINUED | OUTPATIENT
Start: 2023-02-03 | End: 2023-02-03 | Stop reason: HOSPADM

## 2023-02-03 RX ORDER — SODIUM CHLORIDE 9 MG/ML
1000 INJECTION, SOLUTION INTRAVENOUS
Status: DISCONTINUED | OUTPATIENT
Start: 2023-02-03 | End: 2023-02-03 | Stop reason: HOSPADM

## 2023-02-03 RX ORDER — SODIUM CHLORIDE 0.9 % (FLUSH) 0.9 %
10 SYRINGE (ML) INJECTION
Status: DISCONTINUED | OUTPATIENT
Start: 2023-02-03 | End: 2023-02-03 | Stop reason: HOSPADM

## 2023-02-03 RX ADMIN — Medication 10 ML: at 03:02

## 2023-02-03 RX ADMIN — Medication 500 UNITS: at 03:02

## 2023-02-03 RX ADMIN — SODIUM CHLORIDE 1000 ML: 9 INJECTION, SOLUTION INTRAVENOUS at 01:02

## 2023-02-10 ENCOUNTER — TELEPHONE (OUTPATIENT)
Dept: HEMATOLOGY/ONCOLOGY | Facility: CLINIC | Age: 53
End: 2023-02-10
Payer: MEDICAID

## 2023-02-10 ENCOUNTER — HOSPITAL ENCOUNTER (OUTPATIENT)
Dept: RADIOLOGY | Facility: HOSPITAL | Age: 53
Discharge: HOME | End: 2023-02-10
Attending: INTERNAL MEDICINE
Payer: MEDICAID

## 2023-02-10 DIAGNOSIS — C18.7 MALIGNANT NEOPLASM OF SIGMOID COLON: ICD-10-CM

## 2023-02-10 PROCEDURE — A9698 NON-RAD CONTRAST MATERIALNOC: HCPCS | Mod: PO | Performed by: INTERNAL MEDICINE

## 2023-02-10 PROCEDURE — 71260 CT THORAX DX C+: CPT | Mod: TC,PO

## 2023-02-10 PROCEDURE — 74177 CT ABD & PELVIS W/CONTRAST: CPT | Mod: TC,PO

## 2023-02-10 PROCEDURE — 74177 CT CHEST ABDOMEN PELVIS WITH CONTRAST (XPD): ICD-10-PCS | Mod: 26,,, | Performed by: RADIOLOGY

## 2023-02-10 PROCEDURE — 71260 CT CHEST ABDOMEN PELVIS WITH CONTRAST (XPD): ICD-10-PCS | Mod: 26,,, | Performed by: RADIOLOGY

## 2023-02-10 PROCEDURE — 71260 CT THORAX DX C+: CPT | Mod: 26,,, | Performed by: RADIOLOGY

## 2023-02-10 PROCEDURE — 25500020 PHARM REV CODE 255: Mod: PO | Performed by: INTERNAL MEDICINE

## 2023-02-10 PROCEDURE — 74177 CT ABD & PELVIS W/CONTRAST: CPT | Mod: 26,,, | Performed by: RADIOLOGY

## 2023-02-10 RX ADMIN — BARIUM SULFATE 900 ML: 20 SUSPENSION ORAL at 09:02

## 2023-02-10 RX ADMIN — IOHEXOL 100 ML: 350 INJECTION, SOLUTION INTRAVENOUS at 08:02

## 2023-02-10 NOTE — PROGRESS NOTES
Please let her know that her scans are stable and we will continue with the current therapy. Thanks

## 2023-02-10 NOTE — TELEPHONE ENCOUNTER
Patient advised of below message from Dr Santo and verbalized understanding.   ----- Message from Marah Santo MD sent at 2/10/2023 10:03 AM CST -----  Please let her know that her scans are stable and we will continue with the current therapy. Thanks

## 2023-02-13 ENCOUNTER — TELEPHONE (OUTPATIENT)
Dept: PSYCHIATRY | Facility: CLINIC | Age: 53
End: 2023-02-13
Payer: MEDICAID

## 2023-02-13 ENCOUNTER — LAB VISIT (OUTPATIENT)
Dept: LAB | Facility: HOSPITAL | Age: 53
End: 2023-02-13
Attending: INTERNAL MEDICINE
Payer: MEDICAID

## 2023-02-13 ENCOUNTER — OFFICE VISIT (OUTPATIENT)
Dept: HEMATOLOGY/ONCOLOGY | Facility: CLINIC | Age: 53
End: 2023-02-13
Payer: MEDICAID

## 2023-02-13 VITALS
DIASTOLIC BLOOD PRESSURE: 83 MMHG | BODY MASS INDEX: 49.18 KG/M2 | OXYGEN SATURATION: 97 % | HEIGHT: 66 IN | SYSTOLIC BLOOD PRESSURE: 137 MMHG | HEART RATE: 111 BPM | RESPIRATION RATE: 18 BRPM | TEMPERATURE: 98 F

## 2023-02-13 DIAGNOSIS — T45.1X5A CHEMOTHERAPY-INDUCED NAUSEA: ICD-10-CM

## 2023-02-13 DIAGNOSIS — E83.52 HYPERCALCEMIA: ICD-10-CM

## 2023-02-13 DIAGNOSIS — F41.9 ANXIETY: ICD-10-CM

## 2023-02-13 DIAGNOSIS — R74.01 TRANSAMINITIS: ICD-10-CM

## 2023-02-13 DIAGNOSIS — C77.9 SECONDARY ADENOCARCINOMA OF LYMPH NODE: ICD-10-CM

## 2023-02-13 DIAGNOSIS — C18.7 MALIGNANT NEOPLASM OF SIGMOID COLON: Primary | ICD-10-CM

## 2023-02-13 DIAGNOSIS — R05.9 COUGH, UNSPECIFIED TYPE: ICD-10-CM

## 2023-02-13 DIAGNOSIS — R11.0 CHEMOTHERAPY-INDUCED NAUSEA: ICD-10-CM

## 2023-02-13 DIAGNOSIS — C18.7 MALIGNANT NEOPLASM OF SIGMOID COLON: ICD-10-CM

## 2023-02-13 DIAGNOSIS — C77.2 METASTASIS TO INTESTINAL LYMPH NODE: ICD-10-CM

## 2023-02-13 LAB
ALBUMIN SERPL BCP-MCNC: 3.7 G/DL (ref 3.5–5.2)
ALP SERPL-CCNC: 119 U/L (ref 55–135)
ALT SERPL W/O P-5'-P-CCNC: 87 U/L (ref 10–44)
ANION GAP SERPL CALC-SCNC: 8 MMOL/L (ref 8–16)
AST SERPL-CCNC: 65 U/L (ref 10–40)
BASOPHILS # BLD AUTO: 0.02 K/UL (ref 0–0.2)
BASOPHILS NFR BLD: 0.5 % (ref 0–1.9)
BILIRUB SERPL-MCNC: 0.4 MG/DL (ref 0.1–1)
BILIRUB UR QL STRIP: NEGATIVE
BUN SERPL-MCNC: 18 MG/DL (ref 6–20)
CALCIUM SERPL-MCNC: 10.7 MG/DL (ref 8.7–10.5)
CHLORIDE SERPL-SCNC: 108 MMOL/L (ref 95–110)
CLARITY UR: CLEAR
CO2 SERPL-SCNC: 23 MMOL/L (ref 23–29)
COLOR UR: YELLOW
CREAT SERPL-MCNC: 1 MG/DL (ref 0.5–1.4)
DIFFERENTIAL METHOD: ABNORMAL
EOSINOPHIL # BLD AUTO: 0.2 K/UL (ref 0–0.5)
EOSINOPHIL NFR BLD: 4.3 % (ref 0–8)
ERYTHROCYTE [DISTWIDTH] IN BLOOD BY AUTOMATED COUNT: 18 % (ref 11.5–14.5)
EST. GFR  (NO RACE VARIABLE): >60 ML/MIN/1.73 M^2
GLUCOSE SERPL-MCNC: 102 MG/DL (ref 70–110)
GLUCOSE UR QL STRIP: NEGATIVE
HCT VFR BLD AUTO: 44.7 % (ref 37–48.5)
HGB BLD-MCNC: 14.4 G/DL (ref 12–16)
HGB UR QL STRIP: NEGATIVE
IMM GRANULOCYTES # BLD AUTO: 0.01 K/UL (ref 0–0.04)
IMM GRANULOCYTES NFR BLD AUTO: 0.3 % (ref 0–0.5)
KETONES UR QL STRIP: NEGATIVE
LEUKOCYTE ESTERASE UR QL STRIP: NEGATIVE
LYMPHOCYTES # BLD AUTO: 1.2 K/UL (ref 1–4.8)
LYMPHOCYTES NFR BLD: 29 % (ref 18–48)
MAGNESIUM SERPL-MCNC: 2 MG/DL (ref 1.6–2.6)
MCH RBC QN AUTO: 29.5 PG (ref 27–31)
MCHC RBC AUTO-ENTMCNC: 32.2 G/DL (ref 32–36)
MCV RBC AUTO: 92 FL (ref 82–98)
MONOCYTES # BLD AUTO: 0.6 K/UL (ref 0.3–1)
MONOCYTES NFR BLD: 14.9 % (ref 4–15)
NEUTROPHILS # BLD AUTO: 2 K/UL (ref 1.8–7.7)
NEUTROPHILS NFR BLD: 51 % (ref 38–73)
NITRITE UR QL STRIP: NEGATIVE
NRBC BLD-RTO: 0 /100 WBC
PH UR STRIP: 6 [PH] (ref 5–8)
PLATELET # BLD AUTO: 191 K/UL (ref 150–450)
PMV BLD AUTO: 9.5 FL (ref 9.2–12.9)
POTASSIUM SERPL-SCNC: 4.3 MMOL/L (ref 3.5–5.1)
PROT SERPL-MCNC: 6.9 G/DL (ref 6–8.4)
PROT UR QL STRIP: NEGATIVE
RBC # BLD AUTO: 4.88 M/UL (ref 4–5.4)
SODIUM SERPL-SCNC: 139 MMOL/L (ref 136–145)
SP GR UR STRIP: 1.02 (ref 1–1.03)
URN SPEC COLLECT METH UR: NORMAL
WBC # BLD AUTO: 3.96 K/UL (ref 3.9–12.7)

## 2023-02-13 PROCEDURE — 99999 PR PBB SHADOW E&M-EST. PATIENT-LVL IV: ICD-10-PCS | Mod: PBBFAC,,, | Performed by: INTERNAL MEDICINE

## 2023-02-13 PROCEDURE — 85025 COMPLETE CBC W/AUTO DIFF WBC: CPT | Mod: PN | Performed by: INTERNAL MEDICINE

## 2023-02-13 PROCEDURE — 36415 COLL VENOUS BLD VENIPUNCTURE: CPT | Mod: PN | Performed by: INTERNAL MEDICINE

## 2023-02-13 PROCEDURE — 4010F ACE/ARB THERAPY RXD/TAKEN: CPT | Mod: CPTII,,, | Performed by: INTERNAL MEDICINE

## 2023-02-13 PROCEDURE — 3008F PR BODY MASS INDEX (BMI) DOCUMENTED: ICD-10-PCS | Mod: CPTII,,, | Performed by: INTERNAL MEDICINE

## 2023-02-13 PROCEDURE — 3079F DIAST BP 80-89 MM HG: CPT | Mod: CPTII,,, | Performed by: INTERNAL MEDICINE

## 2023-02-13 PROCEDURE — 99215 OFFICE O/P EST HI 40 MIN: CPT | Mod: S$PBB,,, | Performed by: INTERNAL MEDICINE

## 2023-02-13 PROCEDURE — 3079F PR MOST RECENT DIASTOLIC BLOOD PRESSURE 80-89 MM HG: ICD-10-PCS | Mod: CPTII,,, | Performed by: INTERNAL MEDICINE

## 2023-02-13 PROCEDURE — 80053 COMPREHEN METABOLIC PANEL: CPT | Mod: PN | Performed by: INTERNAL MEDICINE

## 2023-02-13 PROCEDURE — 4010F PR ACE/ARB THEARPY RXD/TAKEN: ICD-10-PCS | Mod: CPTII,,, | Performed by: INTERNAL MEDICINE

## 2023-02-13 PROCEDURE — 3075F SYST BP GE 130 - 139MM HG: CPT | Mod: CPTII,,, | Performed by: INTERNAL MEDICINE

## 2023-02-13 PROCEDURE — 99999 PR PBB SHADOW E&M-EST. PATIENT-LVL IV: CPT | Mod: PBBFAC,,, | Performed by: INTERNAL MEDICINE

## 2023-02-13 PROCEDURE — 3008F BODY MASS INDEX DOCD: CPT | Mod: CPTII,,, | Performed by: INTERNAL MEDICINE

## 2023-02-13 PROCEDURE — 81003 URINALYSIS AUTO W/O SCOPE: CPT | Mod: PN | Performed by: INTERNAL MEDICINE

## 2023-02-13 PROCEDURE — 99215 PR OFFICE/OUTPT VISIT, EST, LEVL V, 40-54 MIN: ICD-10-PCS | Mod: S$PBB,,, | Performed by: INTERNAL MEDICINE

## 2023-02-13 PROCEDURE — 83735 ASSAY OF MAGNESIUM: CPT | Mod: PN | Performed by: INTERNAL MEDICINE

## 2023-02-13 PROCEDURE — 99214 OFFICE O/P EST MOD 30 MIN: CPT | Mod: PBBFAC,PN | Performed by: INTERNAL MEDICINE

## 2023-02-13 PROCEDURE — 3075F PR MOST RECENT SYSTOLIC BLOOD PRESS GE 130-139MM HG: ICD-10-PCS | Mod: CPTII,,, | Performed by: INTERNAL MEDICINE

## 2023-02-13 RX ORDER — SODIUM CHLORIDE 0.9 % (FLUSH) 0.9 %
10 SYRINGE (ML) INJECTION
Status: CANCELLED | OUTPATIENT
Start: 2023-02-14

## 2023-02-13 RX ORDER — HEPARIN 100 UNIT/ML
500 SYRINGE INTRAVENOUS
Status: CANCELLED | OUTPATIENT
Start: 2023-02-14

## 2023-02-13 RX ORDER — BENZONATATE 100 MG/1
100 CAPSULE ORAL EVERY 6 HOURS PRN
Qty: 30 CAPSULE | Refills: 1 | Status: ON HOLD | OUTPATIENT
Start: 2023-02-13 | End: 2023-05-12 | Stop reason: HOSPADM

## 2023-02-13 RX ORDER — SODIUM CHLORIDE 0.9 % (FLUSH) 0.9 %
10 SYRINGE (ML) INJECTION
Status: CANCELLED | OUTPATIENT
Start: 2023-02-16

## 2023-02-13 RX ORDER — SODIUM CHLORIDE 9 MG/ML
1000 INJECTION, SOLUTION INTRAVENOUS
Status: CANCELLED | OUTPATIENT
Start: 2023-02-16

## 2023-02-13 RX ORDER — HEPARIN 100 UNIT/ML
500 SYRINGE INTRAVENOUS
Status: CANCELLED | OUTPATIENT
Start: 2023-02-16

## 2023-02-13 RX ORDER — LORAZEPAM 2 MG/ML
1 INJECTION INTRAMUSCULAR
Status: CANCELLED | OUTPATIENT
Start: 2023-02-14 | End: 2023-02-14

## 2023-02-13 RX ORDER — ATROPINE SULFATE 0.4 MG/ML
0.4 INJECTION, SOLUTION ENDOTRACHEAL; INTRAMEDULLARY; INTRAMUSCULAR; INTRAVENOUS; SUBCUTANEOUS ONCE AS NEEDED
Status: CANCELLED | OUTPATIENT
Start: 2023-02-14

## 2023-02-13 NOTE — PROGRESS NOTES
PROGRESS NOTE    Subjective:       Patient ID: Gail Mckinnon is a 52 y.o. female.  MRN: [id number]  : 1970    Chief Complaint: Colon Cancer      History of Present Illness:   Gail Mckinnon is a 52 y.o. female who presents with colon cancer, initially stage III and now with LN recurrence.       She completed adjuvant FOLFOX in 2020. She tolerated only 4 cycles of FOLFOX before she developed an infusion reaction to oxaliplatin in cycle 5 was well as cycle 6. She then completed 6 cycles of infusional 5 FU.     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 She was offered second line therapy with FOLFIRI and Avastin.      She presented to the ED on  with left flank pain. CT renal stone study showed Moderate hydronephrosis on the left secondary to 3 mm calculus at the UPJ.    She had a PET scan mid April that showed possible progression of her disease with      She has been to [facility] for another opinion. No change was recommended in systemic therapy but she was offered surgical removal of the aorta caval nodes.  Recent sans at [facility]  show stable disease.      Surgery done 22. Received 2 more cycle of FOLFIRI without Avastin.     She had repeat imaging at [facility] on 22 that unfortunately showed disease progression since her surgery with multiple RP nodes and one mediastinal node.       PET 22  Impression:     1. Progression of disease with interval increase of abdominal and pelvic lymphadenopathy.  2. New area of moderate FDG uptake at the right half of the T9 vertebral body (image 124).  Favor degenerative disc changes.  No underlying osteolytic or osteoblastic lesion.  Recommend continued attention on follow-up.  3. No other evidence of FDG avid disease.     22  Impression After scan comparison:     1. Metastatic portacaval and left periaortic retroperitoneal lymph nodes which remain stable  between the CT of 09/24/2022 and the subsequent PET-CT of 12/08/2022.  There is no evidence of progression of metastatic disease.  2. Nonobstructing bilateral renal calculi and cholelithiasis.     Interim history:  She has resumed FOLFIRI and Avastin and has had a follow up CT scan prior to this visit.   Has some sinus congestion, cough, had one fever episode, resolved with Tylenol. Max tylenol dose one a day. Still has cough.     She has a virtual visit at [facility] on 2/17/23.     Reports stable fatigue, intermittent nausea and anxiety. She has lost some weight, intentional, is watching her diet.   She is following up with Dr. Palm.     2/10/22:  CT chest abd pelvis  Impression:     Stable periportal, retroperitoneal and mesenteric lymphadenopathy compared to PET-CT 12/08/2022.     Stable right middle lobe 3 mm pulmonary nodule.  No new or enlarging pulmonary nodule.     Hepatic steatosis.  Hepatosplenomegaly.     Cholelithiasis.     Bilateral nonobstructing nephrolithiasis.     Small hiatal hernia with retained contrast in the distal esophagus.  Correlate for reflux.      Oncology History:  Oncology History   Malignant neoplasm of sigmoid colon   3/16/2020 Initial Diagnosis    Malignant neoplasm of sigmoid colon     3/31/2020 Cancer Staged    Staging form: Colon and Rectum, AJCC 8th Edition  - Clinical stage from 3/31/2020: Stage IIIC (cT4b, cN2a, cM0)       5/6/2020 - 11/17/2020 Chemotherapy    Treatment Summary   Plan Name: OP FOLFOX 6 Q2W  Treatment Goal: Curative  Status: Inactive  Start Date: 5/6/2020  End Date: 11/6/2020  Provider: Dylan Leyva MD  Chemotherapy: fluorouraciL injection 945 mg, 400 mg/m2 = 945 mg, Intravenous, Clinic/[facility] 1 time, 14 of 14 cycles  Administration: 945 mg (5/6/2020), 945 mg (5/20/2020), 945 mg (6/3/2020), 945 mg (6/17/2020), 945 mg (7/29/2020), 945 mg (8/12/2020), 945 mg (8/26/2020), 945 mg (9/9/2020), 945 mg (9/23/2020), 945 mg (10/6/2020), 945 mg (10/21/2020), 945 mg  (11/4/2020)  fluorouraciL 2,400 mg/m2 = 5,665 mg in sodium chloride 0.9% 240 mL chemo infusion, 2,400 mg/m2 = 5,665 mg, Intravenous, Over 46 hours, 14 of 14 cycles  Administration: 5,665 mg (5/6/2020), 5,665 mg (5/20/2020), 5,665 mg (6/3/2020), 5,665 mg (6/17/2020), 5,665 mg (7/29/2020), 5,665 mg (8/12/2020), 5,665 mg (8/26/2020), 5,665 mg (9/9/2020), 5,665 mg (9/23/2020), 5,665 mg (10/6/2020), 5,665 mg (10/21/2020), 5,665 mg (11/4/2020)  leucovorin calcium 900 mg in dextrose 5 % 250 mL infusion, 945 mg, Intravenous, Clinic/[facility] 1 time, 14 of 14 cycles  Administration: 900 mg (5/6/2020), 900 mg (5/20/2020), 900 mg (6/3/2020), 900 mg (6/17/2020), 945 mg (6/30/2020), 945 mg (7/20/2020), 945 mg (7/29/2020), 945 mg (8/12/2020), 945 mg (8/26/2020), 945 mg (9/9/2020), 945 mg (9/23/2020), 945 mg (10/6/2020), 945 mg (10/21/2020), 945 mg (11/4/2020)  oxaliplatin (ELOXATIN) 200 mg in dextrose 5 % 500 mL chemo infusion, 201 mg, Intravenous, Clinic/[facility] 1 time, 6 of 6 cycles  Dose modification: 65 mg/m2 (original dose 85 mg/m2, Cycle 6)  Administration: 200 mg (5/6/2020), 200 mg (5/20/2020), 200 mg (6/3/2020), 200 mg (6/17/2020), 201 mg (6/30/2020), 150 mg (7/20/2020)       6/16/2021 -  Chemotherapy    Treatment Summary   Plan Name: OP COLORECTAL FOLFIRI + BEVACIZUMAB Q2W  Treatment Goal: Control  Status: Active  Start Date: 6/16/2021  End Date: 5/11/2023 (Planned)  Provider: Marah Santo MD  Chemotherapy: fluorouraciL injection 990 mg, 400 mg/m2 = 990 mg, Intravenous, Clinic/[facility] 1 time, 15 of 15 cycles  Administration: 990 mg (6/16/2021), 990 mg (6/30/2021), 990 mg (7/14/2021), 980 mg (7/28/2021), 990 mg (8/11/2021), 990 mg (8/25/2021), 990 mg (9/8/2021), 990 mg (9/22/2021), 990 mg (10/6/2021), 990 mg (10/20/2021), 990 mg (11/3/2021), 990 mg (12/1/2021), 990 mg (12/15/2021), 990 mg (11/17/2021), 990 mg (12/28/2021)  fluorouraciL 2,400 mg/m2 = 5,950 mg in sodium chloride 0.9% 240 mL chemo infusion, 2,400 mg/m2 = 5,950 mg,  Intravenous, Over 46 hours, 34 of 41 cycles  Administration: 5,950 mg (6/16/2021), 5,950 mg (6/30/2021), 5,950 mg (7/14/2021), 5,880 mg (7/28/2021), 5,930 mg (8/11/2021), 5,930 mg (8/25/2021), 5,930 mg (9/8/2021), 5,930 mg (9/22/2021), 5,930 mg (10/6/2021), 5,930 mg (10/20/2021), 5,930 mg (11/3/2021), 5,930 mg (12/1/2021), 5,930 mg (12/15/2021), 5,930 mg (11/17/2021), 5,930 mg (12/28/2021), 6,050 mg (5/11/2022), 6,025 mg (3/9/2022), 6,025 mg (2/23/2022), 5,930 mg (2/2/2022), 5,930 mg (1/19/2022), 6,050 mg (4/20/2022), 6,025 mg (4/6/2022), 6,025 mg (3/23/2022), 6,050 mg (6/1/2022), 6,050 mg (6/15/2022), 6,050 mg (10/19/2022), 6,050 mg (10/5/2022), 6,050 mg (11/2/2022), 6,050 mg (11/16/2022), 6,050 mg (11/30/2022), 6,050 mg (1/4/2023), 6,050 mg (12/19/2022), 6,050 mg (1/18/2023), 6,000 mg (2/1/2023)  bevacizumab (AVASTIN) 600 mg in sodium chloride 0.9% 100 mL chemo infusion, 660 mg, Intravenous, Clinic/[facility] 1 time, 32 of 39 cycles  Dose modification: 5 mg/kg (original dose 5 mg/kg, Cycle 26, Reason: MD Discretion, Comment: 5 mg/kg original dose)  Administration: 600 mg (6/30/2021), 600 mg (7/14/2021), 600 mg (7/28/2021), 600 mg (6/16/2021), 600 mg (8/11/2021), 655 mg (8/25/2021), 600 mg (9/8/2021), 600 mg (9/22/2021), 600 mg (10/6/2021), 600 mg (10/20/2021), 600 mg (11/3/2021), 655 mg (12/1/2021), 600 mg (12/15/2021), 600 mg (11/17/2021), 600 mg (12/28/2021), 600 mg (5/11/2022), 600 mg (3/9/2022), 600 mg (2/23/2022), 600 mg (2/2/2022), 600 mg (1/19/2022), 600 mg (4/20/2022), 680 mg (4/6/2022), 600 mg (3/23/2022), 680 mg (10/5/2022), 680 mg (10/19/2022), 680 mg (11/2/2022), 680 mg (11/16/2022), 680 mg (11/30/2022), 680 mg (1/4/2023), 680 mg (12/19/2022), 680 mg (1/18/2023), 680 mg (2/1/2023)  irinotecan (CAMPTOSAR) 440 mg in sodium chloride 0.9% 500 mL chemo infusion, 446 mg, Intravenous, [facility] 1 time, 34 of 41 cycles  Dose modification: 180 mg/m2 (original dose 180 mg/m2, Cycle 24)  Administration: 440 mg  (6/16/2021), 440 mg (6/30/2021), 440 mg (7/14/2021), 440 mg (7/28/2021), 440 mg (8/11/2021), 444 mg (8/25/2021), 440 mg (9/8/2021), 440 mg (9/22/2021), 440 mg (10/6/2021), 440 mg (10/20/2021), 440 mg (11/3/2021), 440 mg (12/1/2021), 440 mg (12/15/2021), 440 mg (11/17/2021), 440 mg (12/28/2021), 440 mg (5/11/2022), 440 mg (3/9/2022), 440 mg (2/23/2022), 440 mg (2/2/2022), 440 mg (1/19/2022), 440 mg (4/20/2022), 440 mg (4/6/2022), 440 mg (3/24/2022), 440 mg (6/1/2022), 440 mg (6/15/2022), 440 mg (10/19/2022), 440 mg (10/5/2022), 440 mg (11/2/2022), 440 mg (11/16/2022), 440 mg (11/30/2022), 440 mg (1/4/2023), 440 mg (12/19/2022), 440 mg (1/18/2023), 440 mg (2/1/2023)       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   4/3/2020 Initial Diagnosis    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     5/6/2020 - 11/17/2020 Chemotherapy    Treatment Summary   Plan Name: OP FOLFOX 6 Q2W  Treatment Goal: Curative  Status: Inactive  Start Date: 5/6/2020  End Date: 11/6/2020  Provider: Dylan Leyva MD  Chemotherapy: fluorouraciL injection 945 mg, 400 mg/m2 = 945 mg, Intravenous, Clinic/[facility] 1 time, 14 of 14 cycles  Administration: 945 mg (5/6/2020), 945 mg (5/20/2020), 945 mg (6/3/2020), 945 mg (6/17/2020), 945 mg (7/29/2020), 945 mg (8/12/2020), 945 mg (8/26/2020), 945 mg (9/9/2020), 945 mg (9/23/2020), 945 mg (10/6/2020), 945 mg (10/21/2020), 945 mg (11/4/2020)  fluorouraciL 2,400 mg/m2 = 5,665 mg in sodium chloride 0.9% 240 mL chemo infusion, 2,400 mg/m2 = 5,665 mg, Intravenous, Over 46 hours, 14 of 14 cycles  Administration: 5,665 mg (5/6/2020), 5,665 mg (5/20/2020), 5,665 mg (6/3/2020), 5,665 mg (6/17/2020), 5,665 mg (7/29/2020), 5,665 mg (8/12/2020), 5,665 mg (8/26/2020), 5,665 mg (9/9/2020), 5,665 mg (9/23/2020), 5,665 mg (10/6/2020), 5,665 mg (10/21/2020), 5,665 mg (11/4/2020)  leucovorin calcium 900 mg in dextrose 5 % 250 mL infusion, 945 mg, Intravenous, Clinic/[facility] 1 time, 14 of 14 cycles  Administration: 900 mg (5/6/2020), 900 mg  (5/20/2020), 900 mg (6/3/2020), 900 mg (6/17/2020), 945 mg (6/30/2020), 945 mg (7/20/2020), 945 mg (7/29/2020), 945 mg (8/12/2020), 945 mg (8/26/2020), 945 mg (9/9/2020), 945 mg (9/23/2020), 945 mg (10/6/2020), 945 mg (10/21/2020), 945 mg (11/4/2020)  oxaliplatin (ELOXATIN) 200 mg in dextrose 5 % 500 mL chemo infusion, 201 mg, Intravenous, Clinic/[facility] 1 time, 6 of 6 cycles  Dose modification: 65 mg/m2 (original dose 85 mg/m2, Cycle 6)  Administration: 200 mg (5/6/2020), 200 mg (5/20/2020), 200 mg (6/3/2020), 200 mg (6/17/2020), 201 mg (6/30/2020), 150 mg (7/20/2020)       6/16/2021 -  Chemotherapy    Treatment Summary   Plan Name: OP COLORECTAL FOLFIRI + BEVACIZUMAB Q2W  Treatment Goal: Control  Status: Active  Start Date: 6/16/2021  End Date: 5/11/2023 (Planned)  Provider: Maarh Santo MD  Chemotherapy: fluorouraciL injection 990 mg, 400 mg/m2 = 990 mg, Intravenous, Clinic/[facility] 1 time, 15 of 15 cycles  Administration: 990 mg (6/16/2021), 990 mg (6/30/2021), 990 mg (7/14/2021), 980 mg (7/28/2021), 990 mg (8/11/2021), 990 mg (8/25/2021), 990 mg (9/8/2021), 990 mg (9/22/2021), 990 mg (10/6/2021), 990 mg (10/20/2021), 990 mg (11/3/2021), 990 mg (12/1/2021), 990 mg (12/15/2021), 990 mg (11/17/2021), 990 mg (12/28/2021)  fluorouraciL 2,400 mg/m2 = 5,950 mg in sodium chloride 0.9% 240 mL chemo infusion, 2,400 mg/m2 = 5,950 mg, Intravenous, Over 46 hours, 34 of 41 cycles  Administration: 5,950 mg (6/16/2021), 5,950 mg (6/30/2021), 5,950 mg (7/14/2021), 5,880 mg (7/28/2021), 5,930 mg (8/11/2021), 5,930 mg (8/25/2021), 5,930 mg (9/8/2021), 5,930 mg (9/22/2021), 5,930 mg (10/6/2021), 5,930 mg (10/20/2021), 5,930 mg (11/3/2021), 5,930 mg (12/1/2021), 5,930 mg (12/15/2021), 5,930 mg (11/17/2021), 5,930 mg (12/28/2021), 6,050 mg (5/11/2022), 6,025 mg (3/9/2022), 6,025 mg (2/23/2022), 5,930 mg (2/2/2022), 5,930 mg (1/19/2022), 6,050 mg (4/20/2022), 6,025 mg (4/6/2022), 6,025 mg (3/23/2022), 6,050 mg (6/1/2022), 6,050 mg  (6/15/2022), 6,050 mg (10/19/2022), 6,050 mg (10/5/2022), 6,050 mg (11/2/2022), 6,050 mg (11/16/2022), 6,050 mg (11/30/2022), 6,050 mg (1/4/2023), 6,050 mg (12/19/2022), 6,050 mg (1/18/2023), 6,000 mg (2/1/2023)  bevacizumab (AVASTIN) 600 mg in sodium chloride 0.9% 100 mL chemo infusion, 660 mg, Intravenous, Clinic/[facility] 1 time, 32 of 39 cycles  Dose modification: 5 mg/kg (original dose 5 mg/kg, Cycle 26, Reason: MD Discretion, Comment: 5 mg/kg original dose)  Administration: 600 mg (6/30/2021), 600 mg (7/14/2021), 600 mg (7/28/2021), 600 mg (6/16/2021), 600 mg (8/11/2021), 655 mg (8/25/2021), 600 mg (9/8/2021), 600 mg (9/22/2021), 600 mg (10/6/2021), 600 mg (10/20/2021), 600 mg (11/3/2021), 655 mg (12/1/2021), 600 mg (12/15/2021), 600 mg (11/17/2021), 600 mg (12/28/2021), 600 mg (5/11/2022), 600 mg (3/9/2022), 600 mg (2/23/2022), 600 mg (2/2/2022), 600 mg (1/19/2022), 600 mg (4/20/2022), 680 mg (4/6/2022), 600 mg (3/23/2022), 680 mg (10/5/2022), 680 mg (10/19/2022), 680 mg (11/2/2022), 680 mg (11/16/2022), 680 mg (11/30/2022), 680 mg (1/4/2023), 680 mg (12/19/2022), 680 mg (1/18/2023), 680 mg (2/1/2023)  irinotecan (CAMPTOSAR) 440 mg in sodium chloride 0.9% 500 mL chemo infusion, 446 mg, Intravenous, [facility] 1 time, 34 of 41 cycles  Dose modification: 180 mg/m2 (original dose 180 mg/m2, Cycle 24)  Administration: 440 mg (6/16/2021), 440 mg (6/30/2021), 440 mg (7/14/2021), 440 mg (7/28/2021), 440 mg (8/11/2021), 444 mg (8/25/2021), 440 mg (9/8/2021), 440 mg (9/22/2021), 440 mg (10/6/2021), 440 mg (10/20/2021), 440 mg (11/3/2021), 440 mg (12/1/2021), 440 mg (12/15/2021), 440 mg (11/17/2021), 440 mg (12/28/2021), 440 mg (5/11/2022), 440 mg (3/9/2022), 440 mg (2/23/2022), 440 mg (2/2/2022), 440 mg (1/19/2022), 440 mg (4/20/2022), 440 mg (4/6/2022), 440 mg (3/24/2022), 440 mg (6/1/2022), 440 mg (6/15/2022), 440 mg (10/19/2022), 440 mg (10/5/2022), 440 mg (11/2/2022), 440 mg (11/16/2022), 440 mg (11/30/2022), 440 mg  (2023), 440 mg (2022), 440 mg (2023), 440 mg (2023)           History:  Past Medical History:   Diagnosis Date    Anxiety     Depression     FH: ovarian cancer 3/16/2020    Hx of psychiatric care     Effexor, Paxil, Lexapro, Zoloft, Wellbutrin, Trazodone Buspar    Hyperthyroidism     Hypothyroid     Kidney calculi     Malignant neoplasm of sigmoid colon 3/16/2020    Menorrhagia     Multinodular goiter 2012    Palpitation     Psychiatric problem     Venous insufficiency       Past Surgical History:   Procedure Laterality Date     SECTION, CLASSIC      x3    COLONOSCOPY N/A 2020    Procedure: COLONOSCOPY;  Surgeon: Shane Parker MD;  Location: [facility] ENDO;  Service: Endoscopy;  Laterality: N/A;    COLONOSCOPY N/A 2022    Procedure: COLONOSCOPY;  Surgeon: Shane Parker MD;  Location: [facility] ENDO;  Service: Endoscopy;  Laterality: N/A;    CYSTOSCOPY W/ URETERAL STENT PLACEMENT Bilateral 3/25/2020    Procedure: CYSTOSCOPY, WITH URETERAL STENT INSERTION;  Surgeon: Claudio Tyson MD;  Location: [facility] OR [address] [place] [address];  Service: Urology;  Laterality: Bilateral;    INSERTION OF TUNNELED CENTRAL VENOUS CATHETER (CVC) WITH SUBCUTANEOUS PORT N/A 2020    Procedure: [id number];  Surgeon: Stephen Diagnostic Provider;  Location: [facility] OR [address] [place] [address];  Service: Radiology;  Laterality: N/A;  Room 189/[facility] [address] [facility]    LAPAROSCOPIC SIGMOIDECTOMY N/A 3/25/2020    Procedure: COLECTOMY, SIGMOID, LAPAROSCOPIC, flex sig, ERAS high;  Surgeon: Silvio Man MD;  [facility];  Service: Colon and Rectal;  Laterality: N/A;    MOBILIZATION OF SPLENIC FLEXURE  3/25/2020    Procedure: MOBILIZATION, SPLENIC FLEXURE;  Surgeon: Silvio Man MD;  [facility];  Service: Colon and Rectal;;    tonsillectomy      TOTAL ABDOMINAL HYSTERECTOMY W/ BILATERAL SALPINGOOPHORECTOMY N/A 3/25/2020    Procedure: HYSTERECTOMY, TOTAL, ABDOMINAL, WITH BILATERAL  SALPINGO-OOPHORECTOMY;  Surgeon: Keron Brady MD;  Location: [facility] OR [address] [place] [address];  Service: Oncology;  Laterality: N/A;     Family History   Problem Relation Age of Onset    Heart disease Father     Diabetes Mother 65    Drug abuse Brother     Drug abuse Brother     Cancer Maternal Aunt         lung cancer    Cancer Maternal Grandmother         stomach cancer- started in ovaries    Ovarian cancer Maternal Grandmother         stomach cancer- started in ovaries    Colon cancer Paternal Uncle     Cancer Paternal Uncle     Ovarian cancer Maternal Aunt     Ovarian cancer Maternal Aunt     Ovarian cancer Cousin         mother had ovarian cancer    Drug abuse Son     Drug abuse Son         clean/sober since 2012    Colon cancer Son     No Known Problems Daughter     Uterine cancer Neg Hx     Breast cancer Neg Hx      Social History     Tobacco Use    Smoking status: Never    Smokeless tobacco: Never   Substance and Sexual Activity    Alcohol use: Yes     Comment: occasionally- twice monthly    Drug use: Never    Sexual activity: Yes     Partners: Male     Birth control/protection: See Surgical Hx        ROS:   Review of Systems   Constitutional:  Positive for malaise/fatigue (first few days). Negative for fever and weight loss.   HENT:  Positive for congestion. Negative for hearing loss, nosebleeds and sore throat.    Eyes:  Negative for double vision and photophobia.   Respiratory:  Positive for cough. Negative for hemoptysis, sputum production, shortness of breath and wheezing.    Cardiovascular:  Negative for chest pain, palpitations, orthopnea and leg swelling.   Gastrointestinal:  Positive for nausea (improving). Negative for abdominal pain, blood in stool, constipation, diarrhea, heartburn and vomiting.   Genitourinary:  Negative for dysuria, frequency, hematuria and urgency.   Musculoskeletal:  Negative for back pain, joint pain and myalgias.   Skin:  Negative for itching and rash.   Neurological:  Negative for  "dizziness, tingling, seizures and weakness.   Endo/Heme/Allergies:  Negative for polydipsia. Does not bruise/bleed easily.   Psychiatric/Behavioral:  Negative for depression and memory loss. The patient is nervous/anxious. The patient does not have insomnia.       Objective:     Vitals:    02/13/23 1445   BP: 137/83   Pulse: (!) 111   Resp: 18   Temp: 97.9 °F (36.6 °C)   TempSrc: Temporal   SpO2: 97%   Weight: (P) 135.1 kg (297 lb 13.5 oz)   Height: 5' 6" (1.676 m)   PainSc: 0-No pain       Wt Readings from Last 10 Encounters:   02/13/23 (P) 135.1 kg (297 lb 13.5 oz)   02/03/23 (!) 138.2 kg (304 lb 10.8 oz)   02/01/23 (!) 138.2 kg (304 lb 10.8 oz)   01/30/23 (!) 136.2 kg (300 lb 4.3 oz)   01/20/23 (!) 137.5 kg (303 lb 2.1 oz)   01/18/23 (!) 137.5 kg (303 lb 2.1 oz)   01/18/23 (!) 137.5 kg (303 lb 2.1 oz)   01/04/23 134.5 kg (296 lb 8.3 oz)   01/04/23 134.5 kg (296 lb 8.3 oz)   12/21/22 135.3 kg (298 lb 4.5 oz)       Physical Examination:   Physical Exam  Vitals and nursing note reviewed.   Constitutional:       General: She is not in acute distress.     Appearance: She is not diaphoretic.   HENT:      Head: Normocephalic.      Mouth/Throat:      Pharynx: No oropharyngeal exudate.   Eyes:      General: No scleral icterus.     Conjunctiva/sclera: Conjunctivae normal.   Neck:      Thyroid: No thyromegaly.   Cardiovascular:      Rate and Rhythm: Normal rate and regular rhythm.      Heart sounds: Normal heart sounds. No murmur heard.  Pulmonary:      Effort: Pulmonary effort is normal. No respiratory distress.      Breath sounds: No stridor. No wheezing or rales.   Chest:      Chest wall: No tenderness.   Abdominal:      General: Bowel sounds are normal. There is no distension.      Palpations: Abdomen is soft. There is no mass.      Tenderness: There is no abdominal tenderness. There is no rebound.   Musculoskeletal:         General: No tenderness or deformity. Normal range of motion.      Cervical back: Neck supple. "   Lymphadenopathy:      Cervical: No cervical adenopathy.   Skin:     General: Skin is warm and dry.      Findings: No erythema or rash.   Neurological:      Mental Status: She is alert and oriented to person, place, and time.      Cranial Nerves: No cranial nerve deficit.      Coordination: Coordination normal.      Gait: Gait is intact.   Psychiatric:         Mood and Affect: Affect normal.         Cognition and Memory: Memory normal.         Judgment: Judgment normal.        Diagnostic Tests:  Significant Imaging: I have reviewed and interpreted all pertinent imaging results/findings.  Laboratory Data:  All pertinent labs have been reviewed.  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 Oxaliplatin was stopped due to severe adverse reaction.     Follow-up CTs and PET in May 2021 showed enlarging retroperitoneal node, concerning for metastatic disease.      She started FOLFIRI + Avastin and has continued till July 2022.   Given isolated RP toni disease, she has undergone open Left periaortic and aortocaval lymph node dissection on 7/12/2022.     Follow up scans show disease progression. I have discussed the case with Dr. Montelongo at [facility]. We discussed resuming FOLFIRI-lloyd vs transitioning to BRAF directed therapy (BEACON).   There may be an option ton enroll in  SWOG 2107 (encorafenib/cetuximab +/- nivo) at [facility] if no prior BRAF inhibitor exposure.[facility] is working with the patient's insurance to see if she can be enrolled there.      Patient is interested in the trial and therefore we resumed FOLFIRI- Debo. 8 week interval scans are stable. Repeat scans done prior to today's visit show stable disease and we will continue current therapy.       If she has progressive disease and unable to got to [facility] and trial not available locally, will treat with next line treatment with encorafenib and cetuximab.     Nausea   Improving on compazine.     Mucositis   Continue [facility] soln.     Transaminitis  Fluctuates.  Continue to monitor. No obvious liver mets.     Hypercalcemia   Mild, secondary to hyperparathyroidism.  Avoid calcium supplements.     Hypothyroidism  Multinodular goiter   Continue Levothyroxine. Endocrine consult is appreciated.   Had a non diagnostic biopsy in 2012, usg findings have remained stable. However, given uptake on PET this year, an FNA vs repeat usg in 6 months is recommended. Will follow closely.     Essential HTN   Continue antihypertensives.      Anxiety   Continue to  follow up with Dr. Palm.     MDM includes  :    - Acute or chronic illness or injury that poses a threat to life or bodily function  - Independent review and explanation of 3+ results from unique tests  - Discussion of management and ordering 3+ unique tests  - Extensive discussion of treatment and management  - Prescription drug management  - Drug therapy requiring intensive monitoring for toxicity          ECOG SCORE               Discussion:   No follow-ups on file.    Plan was discussed with the patient at length, and she verbalized understanding. Gail was given an opportunity to ask questions that were answered to her satisfaction, and she was advised to call in the interval if any problems or questions arise.    Electronically signed by Marah Santo MD        Route Chart for  Scheduling    Med Onc Chart Routing      Follow up with physician . 2/27,3/13,3/27,4/24   Follow up with [facility] . 4/10,   Infusion scheduling note    Injection scheduling note    Labs CBC, CMP and urinalysis   Lab interval:     Imaging    Pharmacy appointment    Other referrals        Treatment Plan Information   OP COLORECTAL FOLFIRI + BEVACIZUMAB Q2W   Marah Santo MD   Upcoming Treatment Dates - OP COLORECTAL FOLFIRI + BEVACIZUMAB Q2W    2/14/2023       Pre-Medications       LORazepam injection 1 mg       promethazine (PHENERGAN) 6.25 mg in dextrose 5 % (D5W) 100 mL IVPB       palonosetron 0.25mg/dexAMETHasone 20mg in NS IVPB       Chemotherapy       bevacizumab (AVASTIN) 5 mg/kg = 680 mg in sodium chloride 0.9% 100 mL chemo infusion       irinotecan (CAMPTOSAR) 440 mg in sodium chloride 0.9% 522 mL chemo infusion       leucovorin calcium 400 mg/m2 = 1,010 mg in dextrose 5 % (D5W) 250 mL infusion       fluorouracil (ADRUCIL) 2,400 mg/m2 = 6,050 mg in sodium chloride 0.9% 240 mL chemo infusion       Supportive Care       atropine injection 0.4 mg  2/28/2023       Pre-Medications       LORazepam injection 1 mg       promethazine (PHENERGAN) 6.25 mg in dextrose 5 % 100 mL IVPB       palonosetron 0.25mg/dexAMETHasone 20mg in NS IVPB       Chemotherapy       bevacizumab (AVASTIN) 5 mg/kg = 680 mg in sodium chloride 0.9% 100 mL chemo infusion       irinotecan (CAMPTOSAR) 440 mg in sodium chloride 0.9% 522 mL chemo infusion       leucovorin calcium 400 mg/m2 = 1,010 mg in dextrose 5 % 250 mL infusion       fluorouracil (ADRUCIL) 2,400 mg/m2 = 6,050 mg in sodium chloride 0.9% 240 mL chemo infusion       Supportive Care       atropine injection 0.4 mg  3/14/2023       Pre-Medications       LORazepam injection 1 mg       promethazine (PHENERGAN) 6.25 mg in dextrose 5 % 100 mL IVPB       palonosetron 0.25mg/dexAMETHasone 20mg in NS IVPB       Chemotherapy       bevacizumab (AVASTIN) 5 mg/kg = 680 mg in sodium chloride 0.9%  100 mL chemo infusion       irinotecan (CAMPTOSAR) 440 mg in sodium chloride 0.9% 522 mL chemo infusion       leucovorin calcium 400 mg/m2 = 1,010 mg in dextrose 5 % 250 mL infusion       fluorouracil (ADRUCIL) 2,400 mg/m2 = 6,050 mg in sodium chloride 0.9% 240 mL chemo infusion       Supportive Care       atropine injection 0.4 mg  3/28/2023       Pre-Medications       LORazepam injection 1 mg       promethazine (PHENERGAN) 6.25 mg in dextrose 5 % 100 mL IVPB       palonosetron 0.25mg/dexAMETHasone 20mg in NS IVPB       Chemotherapy       bevacizumab (AVASTIN) 5 mg/kg = 680 mg in sodium chloride 0.9% 100 mL chemo infusion       irinotecan (CAMPTOSAR) 440 mg in sodium chloride 0.9% 522 mL chemo infusion       leucovorin calcium 400 mg/m2 = 1,010 mg in dextrose 5 % 250 mL infusion       fluorouracil (ADRUCIL) 2,400 mg/m2 = 6,050 mg in sodium chloride 0.9% 240 mL chemo infusion       Supportive Care       atropine injection 0.4 mg    Supportive Plan Information  IV FLUIDS AND ELECTROLYTES   Brayden Solo MD   Upcoming Treatment Dates - IV FLUIDS AND ELECTROLYTES    No upcoming days in selected categories.    Therapy Plan Information  PORT FLUSH  Flushes  heparin, porcine (PF) 100 unit/mL injection flush 500 Units  500 Units, Intravenous, Every visit  sodium chloride 0.9% flush 10 mL  10 mL, Intravenous, Every visit

## 2023-02-14 ENCOUNTER — DOCUMENTATION ONLY (OUTPATIENT)
Dept: INFUSION THERAPY | Facility: HOSPITAL | Age: 53
End: 2023-02-14

## 2023-02-14 ENCOUNTER — OFFICE VISIT (OUTPATIENT)
Dept: PSYCHIATRY | Facility: CLINIC | Age: 53
End: 2023-02-14
Payer: MEDICAID

## 2023-02-14 ENCOUNTER — INFUSION (OUTPATIENT)
Dept: INFUSION THERAPY | Facility: HOSPITAL | Age: 53
End: 2023-02-14
Attending: INTERNAL MEDICINE
Payer: MEDICAID

## 2023-02-14 VITALS
BODY MASS INDEX: 47.09 KG/M2 | WEIGHT: 293 LBS | HEIGHT: 66 IN | HEART RATE: 97 BPM | DIASTOLIC BLOOD PRESSURE: 72 MMHG | SYSTOLIC BLOOD PRESSURE: 115 MMHG | RESPIRATION RATE: 18 BRPM | TEMPERATURE: 98 F

## 2023-02-14 DIAGNOSIS — C18.7 MALIGNANT NEOPLASM OF SIGMOID COLON: ICD-10-CM

## 2023-02-14 DIAGNOSIS — F33.1 MODERATE EPISODE OF RECURRENT MAJOR DEPRESSIVE DISORDER: Primary | ICD-10-CM

## 2023-02-14 DIAGNOSIS — C77.2 METASTASIS TO INTESTINAL LYMPH NODE: Primary | ICD-10-CM

## 2023-02-14 PROCEDURE — 4010F PR ACE/ARB THEARPY RXD/TAKEN: ICD-10-PCS | Mod: AH,HB,CPTII, | Performed by: PSYCHOLOGIST

## 2023-02-14 PROCEDURE — 99999 PR PBB SHADOW E&M-EST. PATIENT-LVL I: ICD-10-PCS | Mod: PBBFAC,HB,, | Performed by: PSYCHOLOGIST

## 2023-02-14 PROCEDURE — 36593 DECLOT VASCULAR DEVICE: CPT | Mod: PN

## 2023-02-14 PROCEDURE — 25000003 PHARM REV CODE 250: Mod: PN | Performed by: INTERNAL MEDICINE

## 2023-02-14 PROCEDURE — 96417 CHEMO IV INFUS EACH ADDL SEQ: CPT | Mod: PN

## 2023-02-14 PROCEDURE — 96368 THER/DIAG CONCURRENT INF: CPT | Mod: PN

## 2023-02-14 PROCEDURE — 99211 OFF/OP EST MAY X REQ PHY/QHP: CPT | Mod: PBBFAC,PN,25 | Performed by: PSYCHOLOGIST

## 2023-02-14 PROCEDURE — 4010F ACE/ARB THERAPY RXD/TAKEN: CPT | Mod: AH,HB,CPTII, | Performed by: PSYCHOLOGIST

## 2023-02-14 PROCEDURE — 96416 CHEMO PROLONG INFUSE W/PUMP: CPT | Mod: PN

## 2023-02-14 PROCEDURE — 63600175 PHARM REV CODE 636 W HCPCS: Mod: JG,PN | Performed by: INTERNAL MEDICINE

## 2023-02-14 PROCEDURE — 96367 TX/PROPH/DG ADDL SEQ IV INF: CPT | Mod: PN

## 2023-02-14 PROCEDURE — 90837 PSYTX W PT 60 MINUTES: CPT | Mod: AH,HB,, | Performed by: PSYCHOLOGIST

## 2023-02-14 PROCEDURE — 96413 CHEMO IV INFUSION 1 HR: CPT | Mod: PN

## 2023-02-14 PROCEDURE — 99999 PR PBB SHADOW E&M-EST. PATIENT-LVL I: CPT | Mod: PBBFAC,HB,, | Performed by: PSYCHOLOGIST

## 2023-02-14 PROCEDURE — 96375 TX/PRO/DX INJ NEW DRUG ADDON: CPT | Mod: PN

## 2023-02-14 PROCEDURE — 90837 PR PSYCHOTHERAPY W/PATIENT, 60 MIN: ICD-10-PCS | Mod: AH,HB,, | Performed by: PSYCHOLOGIST

## 2023-02-14 RX ORDER — WATER FOR INJECTION,STERILE
VIAL (ML) INJECTION
Status: DISCONTINUED
Start: 2023-02-14 | End: 2023-02-14 | Stop reason: HOSPADM

## 2023-02-14 RX ORDER — LORAZEPAM 2 MG/ML
1 INJECTION INTRAMUSCULAR
Status: COMPLETED | OUTPATIENT
Start: 2023-02-14 | End: 2023-02-14

## 2023-02-14 RX ORDER — HEPARIN 100 UNIT/ML
500 SYRINGE INTRAVENOUS
Status: DISCONTINUED | OUTPATIENT
Start: 2023-02-14 | End: 2023-02-14 | Stop reason: HOSPADM

## 2023-02-14 RX ORDER — ATROPINE SULFATE 0.4 MG/ML
0.4 INJECTION, SOLUTION ENDOTRACHEAL; INTRAMEDULLARY; INTRAMUSCULAR; INTRAVENOUS; SUBCUTANEOUS ONCE AS NEEDED
Status: COMPLETED | OUTPATIENT
Start: 2023-02-14 | End: 2023-02-14

## 2023-02-14 RX ORDER — SODIUM CHLORIDE 0.9 % (FLUSH) 0.9 %
10 SYRINGE (ML) INJECTION
Status: DISCONTINUED | OUTPATIENT
Start: 2023-02-14 | End: 2023-02-14 | Stop reason: HOSPADM

## 2023-02-14 RX ADMIN — ATROPINE SULFATE 0.4 MG: 0.4 INJECTION, SOLUTION INTRAVENOUS at 11:02

## 2023-02-14 RX ADMIN — FLUOROURACIL 6050 MG: 50 INJECTION, SOLUTION INTRAVENOUS at 01:02

## 2023-02-14 RX ADMIN — ALTEPLASE 2 MG: 2.2 INJECTION, POWDER, LYOPHILIZED, FOR SOLUTION INTRAVENOUS at 09:02

## 2023-02-14 RX ADMIN — LORAZEPAM 1 MG: 2 INJECTION INTRAMUSCULAR; INTRAVENOUS at 10:02

## 2023-02-14 RX ADMIN — SODIUM CHLORIDE 440 MG: 9 INJECTION, SOLUTION INTRAVENOUS at 11:02

## 2023-02-14 RX ADMIN — PROMETHAZINE HYDROCHLORIDE 6.25 MG: 25 INJECTION INTRAMUSCULAR; INTRAVENOUS at 10:02

## 2023-02-14 RX ADMIN — PALONOSETRON 0.25 MG: 0.05 INJECTION, SOLUTION INTRAVENOUS at 10:02

## 2023-02-14 RX ADMIN — BEVACIZUMAB 680 MG: 400 INJECTION, SOLUTION INTRAVENOUS at 11:02

## 2023-02-14 RX ADMIN — LEUCOVORIN CALCIUM 1000 MG: 500 INJECTION, POWDER, LYOPHILIZED, FOR SOLUTION INTRAMUSCULAR; INTRAVENOUS at 11:02

## 2023-02-14 RX ADMIN — SODIUM CHLORIDE: 9 INJECTION, SOLUTION INTRAVENOUS at 08:02

## 2023-02-14 NOTE — PLAN OF CARE
Patient tolerated Avastin/Folfiri well. Blood return noted prior to pump connection, all connections secured with coban, pt has spill kit and tip sheet at home from previous infusions. Verified pump infusing prior to d/c.

## 2023-02-14 NOTE — PROGRESS NOTES
PSYCHO-ONCOLOGY NOTE/ Individual Psychotherapy     Date: 2/14/2023   Site:  [facility]      Therapeutic Intervention: Met with patient.  Outpatient - Insight oriented psychotherapy 60 min - CPT code 36543 and Outpatient - Supportive psychotherapy 60 min - CPT Code 96431    This includes face to face time and non-face to face time preparing to see the patient, obtaining and/or reviewing separately obtained history, documenting clinical information in the electronic or other health record, independently interpreting results and communicating results to the patient/family/caregiver, or care coordinator.      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    Anxiety    Insomnia    Insomnia    Anxiety    Other constipation    Chemotherapy-induced nausea    Generalized anxiety disorder with panic attacks    Chemotherapy adverse reaction    Mucositis due to chemotherapy    Morbid obesity    Secondary adenocarcinoma of lymph node    Thyroid nodule    Hypercalcemia    Transaminitis    Dysuria    Chemotherapy-induced neutropenia    Hypophosphatemia    Functional diarrhea    Primary hypertension       Chief complaint/reason for encounter: adjustment to illness, depression and anxiety      Met with patient to evaluate psychosocial adaptation to diagnosis/treatment of metastatic colon cancer    Current Medications  Current Outpatient Medications   Medication    acetaminophen (TYLENOL) 500 MG tablet    benzonatate (TESSALON PERLES) 100 MG capsule    buPROPion (WELLBUTRIN SR) 150 MG TBSR 12 hr tablet    busPIRone (BUSPAR) 10 MG tablet     granisetron (SANCUSO) 3.1 mg/24 hour    Lactobacillus rhamnosus GG (CULTURELLE) 10 billion cell capsule    lactulose (CHRONULAC) 10 gram/15 mL solution    levothyroxine (SYNTHROID) 200 MCG tablet    LIDOcaine-prilocaine (EMLA) cream    LORazepam (ATIVAN) 1 MG tablet    magic mouthwash diphen/antac/lidoc/nysta    multivitamin (THERAGRAN) per tablet    olmesartan (BENICAR) 20 MG tablet    ondansetron (ZOFRAN-ODT) 8 MG TbDL    prochlorperazine (COMPAZINE) 10 MG tablet    promethazine (PHENERGAN) 12.5 MG Tab    senna-docusate 8.6-50 mg (PERICOLACE) 8.6-50 mg per tablet    tamsulosin (FLOMAX) 0.4 mg Cap    traMADoL (ULTRAM) 50 mg tablet    traZODone (DESYREL) 50 MG tablet     No current facility-administered medications for this visit.     Facility-Administered Medications Ordered in Other Visits   Medication Frequency    alteplase injection 2 mg PRN    atropine injection 0.4 mg Once PRN    bevacizumab (AVASTIN) 5 mg/kg = 680 mg in sodium chloride 0.9% 100 mL chemo infusion 1 time in Clinic/[facility]    fluorouracil (ADRUCIL) 2,400 mg/m2 = 6,050 mg in sodium chloride 0.9% 240 mL chemo infusion over 46 hr    heparin, porcine (PF) 100 unit/mL injection flush 500 Units PRN    irinotecan (CAMPTOSAR) 440 mg in sodium chloride 0.9% 587 mL chemo infusion 1 time in Clinic/[facility]    leucovorin calcium 1,000 mg in dextrose 5 % (D5W) 250 mL infusion 1 time in Clinic/[facility]    LORazepam injection 1 mg 1 time in Clinic/[facility]    palonosetron 0.25mg/dexAMETHasone 20mg in NS IVPB 1 time in Clinic/[facility]    promethazine (PHENERGAN) 6.25 mg in dextrose 5 % (D5W) 100 mL IVPB 1 time in Clinic/[facility]    sodium chloride 0.9% flush 10 mL PRN    sterile water for injection injection        ONCOLOGY HISTORY  Oncology History   Malignant neoplasm of sigmoid colon   3/16/2020 Initial Diagnosis    Malignant neoplasm of sigmoid colon     3/31/2020 Cancer Staged    Staging form: Colon and Rectum, AJCC 8th Edition  - Clinical stage from 3/31/2020: Stage IIIC (cT4b, cN2a,  cM0)       5/6/2020 - 11/17/2020 Chemotherapy    Treatment Summary   Plan Name: OP FOLFOX 6 Q2W  Treatment Goal: Curative  Status: Inactive  Start Date: 5/6/2020  End Date: 11/6/2020  Provider: Dylan Leyva MD  Chemotherapy: fluorouraciL injection 945 mg, 400 mg/m2 = 945 mg, Intravenous, Clinic/H[facility] 1 time, 14 of 14 cycles  Administration: 945 mg (5/6/2020), 945 mg (5/20/2020), 945 mg (6/3/2020), 945 mg (6/17/2020), 945 mg (7/29/2020), 945 mg (8/12/2020), 945 mg (8/26/2020), 945 mg (9/9/2020), 945 mg (9/23/2020), 945 mg (10/6/2020), 945 mg (10/21/2020), 945 mg (11/4/2020)  fluorouraciL 2,400 mg/m2 = 5,665 mg in sodium chloride 0.9% 240 mL chemo infusion, 2,400 mg/m2 = 5,665 mg, Intravenous, Over 46 hours, 14 of 14 cycles  Administration: 5,665 mg (5/6/2020), 5,665 mg (5/20/2020), 5,665 mg (6/3/2020), 5,665 mg (6/17/2020), 5,665 mg (7/29/2020), 5,665 mg (8/12/2020), 5,665 mg (8/26/2020), 5,665 mg (9/9/2020), 5,665 mg (9/23/2020), 5,665 mg (10/6/2020), 5,665 mg (10/21/2020), 5,665 mg (11/4/2020)  leucovorin calcium 900 mg in dextrose 5 % 250 mL infusion, 945 mg, Intravenous, Clinic/[facility] 1 time, 14 of 14 cycles  Administration: 900 mg (5/6/2020), 900 mg (5/20/2020), 900 mg (6/3/2020), 900 mg (6/17/2020), 945 mg (6/30/2020), 945 mg (7/20/2020), 945 mg (7/29/2020), 945 mg (8/12/2020), 945 mg (8/26/2020), 945 mg (9/9/2020), 945 mg (9/23/2020), 945 mg (10/6/2020), 945 mg (10/21/2020), 945 mg (11/4/2020)  oxaliplatin (ELOXATIN) 200 mg in dextrose 5 % 500 mL chemo infusion, 201 mg, Intravenous, Clinic/HOD 1 time, 6 of 6 cycles  Dose modification: 65 mg/m2 (original dose 85 mg/m2, Cycle 6)  Administration: 200 mg (5/6/2020), 200 mg (5/20/2020), 200 mg (6/3/2020), 200 mg (6/17/2020), 201 mg (6/30/2020), 150 mg (7/20/2020)       6/16/2021 -  Chemotherapy    Treatment Summary   Plan Name: OP COLORECTAL FOLFIRI + BEVACIZUMAB Q2W  Treatment Goal: Control  Status: Active  Start Date: 6/16/2021  End Date: 5/11/2023  (Planned)  Provider: Marah Santo MD  Chemotherapy: fluorouraciL injection 990 mg, 400 mg/m2 = 990 mg, Intravenous, [facility] 1 time, 15 of 15 cycles  Administration: 990 mg (6/16/2021), 990 mg (6/30/2021), 990 mg (7/14/2021), 980 mg (7/28/2021), 990 mg (8/11/2021), 990 mg (8/25/2021), 990 mg (9/8/2021), 990 mg (9/22/2021), 990 mg (10/6/2021), 990 mg (10/20/2021), 990 mg (11/3/2021), 990 mg (12/1/2021), 990 mg (12/15/2021), 990 mg (11/17/2021), 990 mg (12/28/2021)  fluorouraciL 2,400 mg/m2 = 5,950 mg in sodium chloride 0.9% 240 mL chemo infusion, 2,400 mg/m2 = 5,950 mg, Intravenous, Over 46 hours, 35 of 41 cycles  Administration: 5,950 mg (6/16/2021), 5,950 mg (6/30/2021), 5,950 mg (7/14/2021), 5,880 mg (7/28/2021), 5,930 mg (8/11/2021), 5,930 mg (8/25/2021), 5,930 mg (9/8/2021), 5,930 mg (9/22/2021), 5,930 mg (10/6/2021), 5,930 mg (10/20/2021), 5,930 mg (11/3/2021), 5,930 mg (12/1/2021), 5,930 mg (12/15/2021), 5,930 mg (11/17/2021), 5,930 mg (12/28/2021), 6,050 mg (5/11/2022), 6,025 mg (3/9/2022), 6,025 mg (2/23/2022), 5,930 mg (2/2/2022), 5,930 mg (1/19/2022), 6,050 mg (4/20/2022), 6,025 mg (4/6/2022), 6,025 mg (3/23/2022), 6,050 mg (6/1/2022), 6,050 mg (6/15/2022), 6,050 mg (10/19/2022), 6,050 mg (10/5/2022), 6,050 mg (11/2/2022), 6,050 mg (11/16/2022), 6,050 mg (11/30/2022), 6,050 mg (1/4/2023), 6,050 mg (12/19/2022), 6,050 mg (1/18/2023), 6,000 mg (2/1/2023)  bevacizumab (AVASTIN) 600 mg in sodium chloride 0.9% 100 mL chemo infusion, 660 mg, Intravenous, [facility] 1 time, 33 of 39 cycles  Dose modification: 5 mg/kg (original dose 5 mg/kg, Cycle 26, Reason: MD Discretion, Comment: 5 mg/kg original dose)  Administration: 600 mg (6/30/2021), 600 mg (7/14/2021), 600 mg (7/28/2021), 600 mg (6/16/2021), 600 mg (8/11/2021), 655 mg (8/25/2021), 600 mg (9/8/2021), 600 mg (9/22/2021), 600 mg (10/6/2021), 600 mg (10/20/2021), 600 mg (11/3/2021), 655 mg (12/1/2021), 600 mg (12/15/2021), 600 mg (11/17/2021), 600 mg  (12/28/2021), 600 mg (5/11/2022), 600 mg (3/9/2022), 600 mg (2/23/2022), 600 mg (2/2/2022), 600 mg (1/19/2022), 600 mg (4/20/2022), 680 mg (4/6/2022), 600 mg (3/23/2022), 680 mg (10/5/2022), 680 mg (10/19/2022), 680 mg (11/2/2022), 680 mg (11/16/2022), 680 mg (11/30/2022), 680 mg (1/4/2023), 680 mg (12/19/2022), 680 mg (1/18/2023), 680 mg (2/1/2023)  irinotecan (CAMPTOSAR) 440 mg in sodium chloride 0.9% 500 mL chemo infusion, 446 mg, Intravenous, [facility] 1 time, 35 of 41 cycles  Dose modification: 180 mg/m2 (original dose 180 mg/m2, Cycle 24)  Administration: 440 mg (6/16/2021), 440 mg (6/30/2021), 440 mg (7/14/2021), 440 mg (7/28/2021), 440 mg (8/11/2021), 444 mg (8/25/2021), 440 mg (9/8/2021), 440 mg (9/22/2021), 440 mg (10/6/2021), 440 mg (10/20/2021), 440 mg (11/3/2021), 440 mg (12/1/2021), 440 mg (12/15/2021), 440 mg (11/17/2021), 440 mg (12/28/2021), 440 mg (5/11/2022), 440 mg (3/9/2022), 440 mg (2/23/2022), 440 mg (2/2/2022), 440 mg (1/19/2022), 440 mg (4/20/2022), 440 mg (4/6/2022), 440 mg (3/24/2022), 440 mg (6/1/2022), 440 mg (6/15/2022), 440 mg (10/19/2022), 440 mg (10/5/2022), 440 mg (11/2/2022), 440 mg (11/16/2022), 440 mg (11/30/2022), 440 mg (1/4/2023), 440 mg (12/19/2022), 440 mg (1/18/2023), 440 mg (2/1/2023)       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   4/3/2020 Initial Diagnosis    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     5/6/2020 - 11/17/2020 Chemotherapy    Treatment Summary   Plan Name: OP FOLFOX 6 Q2W  Treatment Goal: Curative  Status: Inactive  Start Date: 5/6/2020  End Date: 11/6/2020  Provider: Dylan Leyva MD  Chemotherapy: fluorouraciL injection 945 mg, 400 mg/m2 = 945 mg, Intravenous, Clinic/[facility] 1 time, 14 of 14 cycles  Administration: 945 mg (5/6/2020), 945 mg (5/20/2020), 945 mg (6/3/2020), 945 mg (6/17/2020), 945 mg (7/29/2020), 945 mg (8/12/2020), 945 mg (8/26/2020), 945 mg (9/9/2020), 945 mg (9/23/2020), 945 mg (10/6/2020), 945 mg (10/21/2020), 945 mg (11/4/2020)  fluorouraciL  2,400 mg/m2 = 5,665 mg in sodium chloride 0.9% 240 mL chemo infusion, 2,400 mg/m2 = 5,665 mg, Intravenous, Over 46 hours, 14 of 14 cycles  Administration: 5,665 mg (5/6/2020), 5,665 mg (5/20/2020), 5,665 mg (6/3/2020), 5,665 mg (6/17/2020), 5,665 mg (7/29/2020), 5,665 mg (8/12/2020), 5,665 mg (8/26/2020), 5,665 mg (9/9/2020), 5,665 mg (9/23/2020), 5,665 mg (10/6/2020), 5,665 mg (10/21/2020), 5,665 mg (11/4/2020)  leucovorin calcium 900 mg in dextrose 5 % 250 mL infusion, 945 mg, Intravenous, Clinic/H[facility] 1 time, 14 of 14 cycles  Administration: 900 mg (5/6/2020), 900 mg (5/20/2020), 900 mg (6/3/2020), 900 mg (6/17/2020), 945 mg (6/30/2020), 945 mg (7/20/2020), 945 mg (7/29/2020), 945 mg (8/12/2020), 945 mg (8/26/2020), 945 mg (9/9/2020), 945 mg (9/23/2020), 945 mg (10/6/2020), 945 mg (10/21/2020), 945 mg (11/4/2020)  oxaliplatin (ELOXATIN) 200 mg in dextrose 5 % 500 mL chemo infusion, 201 mg, Intravenous, Clinic/[facility] 1 time, 6 of 6 cycles  Dose modification: 65 mg/m2 (original dose 85 mg/m2, Cycle 6)  Administration: 200 mg (5/6/2020), 200 mg (5/20/2020), 200 mg (6/3/2020), 200 mg (6/17/2020), 201 mg (6/30/2020), 150 mg (7/20/2020)       6/16/2021 -  Chemotherapy    Treatment Summary   Plan Name: OP COLORECTAL FOLFIRI + BEVACIZUMAB Q2W  Treatment Goal: Control  Status: Active  Start Date: 6/16/2021  End Date: 5/11/2023 (Planned)  Provider: Marah Santo MD  Chemotherapy: fluorouraciL injection 990 mg, 400 mg/m2 = 990 mg, Intravenous, Clinic/[facility] 1 time, 15 of 15 cycles  Administration: 990 mg (6/16/2021), 990 mg (6/30/2021), 990 mg (7/14/2021), 980 mg (7/28/2021), 990 mg (8/11/2021), 990 mg (8/25/2021), 990 mg (9/8/2021), 990 mg (9/22/2021), 990 mg (10/6/2021), 990 mg (10/20/2021), 990 mg (11/3/2021), 990 mg (12/1/2021), 990 mg (12/15/2021), 990 mg (11/17/2021), 990 mg (12/28/2021)  fluorouraciL 2,400 mg/m2 = 5,950 mg in sodium chloride 0.9% 240 mL chemo infusion, 2,400 mg/m2 = 5,950 mg, Intravenous, Over 46 hours,  35 of 41 cycles  Administration: 5,950 mg (6/16/2021), 5,950 mg (6/30/2021), 5,950 mg (7/14/2021), 5,880 mg (7/28/2021), 5,930 mg (8/11/2021), 5,930 mg (8/25/2021), 5,930 mg (9/8/2021), 5,930 mg (9/22/2021), 5,930 mg (10/6/2021), 5,930 mg (10/20/2021), 5,930 mg (11/3/2021), 5,930 mg (12/1/2021), 5,930 mg (12/15/2021), 5,930 mg (11/17/2021), 5,930 mg (12/28/2021), 6,050 mg (5/11/2022), 6,025 mg (3/9/2022), 6,025 mg (2/23/2022), 5,930 mg (2/2/2022), 5,930 mg (1/19/2022), 6,050 mg (4/20/2022), 6,025 mg (4/6/2022), 6,025 mg (3/23/2022), 6,050 mg (6/1/2022), 6,050 mg (6/15/2022), 6,050 mg (10/19/2022), 6,050 mg (10/5/2022), 6,050 mg (11/2/2022), 6,050 mg (11/16/2022), 6,050 mg (11/30/2022), 6,050 mg (1/4/2023), 6,050 mg (12/19/2022), 6,050 mg (1/18/2023), 6,000 mg (2/1/2023)  bevacizumab (AVASTIN) 600 mg in sodium chloride 0.9% 100 mL chemo infusion, 660 mg, Intravenous, Clinic/HOD 1 time, 33 of 39 cycles  Dose modification: 5 mg/kg (original dose 5 mg/kg, Cycle 26, Reason: MD Discretion, Comment: 5 mg/kg original dose)  Administration: 600 mg (6/30/2021), 600 mg (7/14/2021), 600 mg (7/28/2021), 600 mg (6/16/2021), 600 mg (8/11/2021), 655 mg (8/25/2021), 600 mg (9/8/2021), 600 mg (9/22/2021), 600 mg (10/6/2021), 600 mg (10/20/2021), 600 mg (11/3/2021), 655 mg (12/1/2021), 600 mg (12/15/2021), 600 mg (11/17/2021), 600 mg (12/28/2021), 600 mg (5/11/2022), 600 mg (3/9/2022), 600 mg (2/23/2022), 600 mg (2/2/2022), 600 mg (1/19/2022), 600 mg (4/20/2022), 680 mg (4/6/2022), 600 mg (3/23/2022), 680 mg (10/5/2022), 680 mg (10/19/2022), 680 mg (11/2/2022), 680 mg (11/16/2022), 680 mg (11/30/2022), 680 mg (1/4/2023), 680 mg (12/19/2022), 680 mg (1/18/2023), 680 mg (2/1/2023)  irinotecan (CAMPTOSAR) 440 mg in sodium chloride 0.9% 500 mL chemo infusion, 446 mg, Intravenous, [facility] [place] [facility]\"" 1 time, 35 of 41 cycles  Dose modification: 180 mg/m2 (original dose 180 mg/m2, Cycle 24)  Administration: 440 mg (6/16/2021), 440 mg (6/30/2021), 440  mg (7/14/2021), 440 mg (7/28/2021), 440 mg (8/11/2021), 444 mg (8/25/2021), 440 mg (9/8/2021), 440 mg (9/22/2021), 440 mg (10/6/2021), 440 mg (10/20/2021), 440 mg (11/3/2021), 440 mg (12/1/2021), 440 mg (12/15/2021), 440 mg (11/17/2021), 440 mg (12/28/2021), 440 mg (5/11/2022), 440 mg (3/9/2022), 440 mg (2/23/2022), 440 mg (2/2/2022), 440 mg (1/19/2022), 440 mg (4/20/2022), 440 mg (4/6/2022), 440 mg (3/24/2022), 440 mg (6/1/2022), 440 mg (6/15/2022), 440 mg (10/19/2022), 440 mg (10/5/2022), 440 mg (11/2/2022), 440 mg (11/16/2022), 440 mg (11/30/2022), 440 mg (1/4/2023), 440 mg (12/19/2022), 440 mg (1/18/2023), 440 mg (2/1/2023)           Objective:  Gail Mckinnon arrived promptly for the session, meeting during her scheduled infusion (multiple nurse visits regarding port during visit). The patient was fully cooperative and pleasant throughout the session.  Appearance: age appropriate, casually  dressed, well groomed  Behavior/Cooperation: friendly and cooperative  Speech: appropriate quality, quantity and organization of sentences  Mood: steady, anxious  Affect: mood congruent and appropriate  Thought Process: goal-directed, logical  Thought Content: normal,  No delusions or paranoia; did not appear to be responding to internal stimuli during the session  Orientation: grossly intact  Memory: grossly intact  Attention Span/Concentration: Attends to session without distraction; reports no difficulty  Fund of Knowledge: average  Estimate of Intelligence: average from verbal skills and history  Cognition: grossly intact  Insight: patient has awareness of illness; good insight into own behavior and behavior of others  Judgment: the patient's behavior is adequate to circumstances    NCCN Distress thermometer:   DISTRESS SCREENING 1/11/2023 1/4/2023 12/28/2022 12/9/2022 11/28/2022 11/13/2022 10/31/2022   Distress Score 5 0 - No Distress 5 8 8 6 7   Practical Problems  Housing;Insurance/Financial;Work/School;Treatment Decisions None of these Housing;Insurance/Financial;Treatment Decisions Housing;Insurance/Financial;Work/School;Treatment Decisions Housing;Insurance/Financial;Treatment Decisions Insurance/Financial;Work/School;Treatment Decisions Work/School;Insurance/Financial   Family Problems Family Health Issues None of these Family Health Issues None of these Family Health Issues Family Health Issues None of these   Emotional Problems Depression;Fears;Sadness;Worry None of these Depression;Fears;Sadness;Worry Depression;Fears;Sadness;Worry Depression;Fears;Sadness;Worry Depression;Sadness;Worry Worry;Depression   Spiritual / Islam Concerns No No No No No No No   Physical Problems Appearance;Fatigue;Feeling Swollen;Memory/Concentration;Mouth Sores;Nausea;Skin Dry/Itchy;Tingling in hands or feet None of these Appearance;Fatigue;Feeling Swollen;Memory/Concentration;Mouth Sores;Tingling in hands or feet Appearance;Fatigue;Feeling Swollen;Mouth Sores;Nausea;Tingling in hands or feet Constipation;Fatigue;Feeling Swollen;Getting Around;Memory/Concentration;Mouth Sores;Nausea;Pain;Skin Dry/Itchy;Sleep;Tingling in hands or feet Appearance;Constipation;Fatigue;Getting Around;Memory/Concentration;Mouth Sores;Pain Appearance;Sleep;Mouth Sores;Fatigue   Other Problems - - - - - - -   Some encounter information is confidential and restricted. Go to Review Flowsheets activity to see all data.        Interval history and content of current session: Discussed treatment and family's adaptation to disease and treatment status, exploring son's recent substance use issues (provided IOP referral resources) and scheduled follow up w/ [facility]. Reports to be coping w/ improved efficacy, noting enhanced resilience secondary to engagement in self-care strategies and acknowledgment of personal/interpersonal limits. Evaluated cognitive response, paying particular attention to negative intrusive thoughts  of a persistent and detrimental nature. Thoughts of this type are in evidence with mild distress and are associated w/ acceptance of illness stability as a measure of tx success. Provided cognitive behavioral therapy to address negative cognitions. Identified and evaluated psychosocial and environmental stressors secondary to diagnosis and treatment.  Examined proactive behaviors that may be implemented to minimize or ameliorate psychosocial stressors secondary to diagnosis and treatment.     Risk parameters:   Patient reports no suicidal ideation  Patient reports no homicidal ideation  Patient reports no self-injurious behavior  Patient reports no violent behavior   Safety needs:  None at this time      Verbal deficits: None     Patient's response to intervention:The patient's response to intervention is accepting, motivated.     Progress toward goals and other mental status changes:  The patient's progress toward goals is good.      Progress to date:Progress - Ongoing, but Slow      Goals from last visit: Met        Patient Strengths: verbal, motivated, intelligent, successful, good social support, good insight, commitment to wellness, strong cultural traditions        Treatment Plan:individual psychotherapy  Target symptoms: depression, anxiety, adjustment  Why chosen therapy is appropriate versus another modality: relevant to diagnosis, patient responds to this modality, evidence based practice  Outcome monitoring methods: self-report, observation, tx team feedback  Therapeutic intervention type: insight oriented psychotherapy, supportive psychotherapy  Prognosis: Good                            Behavioral goals:               Exercise: continued/increased as per physician recommendations              Stress management: appropriate boundaries and accepting aid when needed              Social engagement: continued and increased especially w/ grandchildren/family, as per CDC COVID-19 guidelines               Therapy:  adaptive coping, CBT (cognitive restructuring), management of all or nothing thinking patterns, control in knowing limitations    Return to clinic: 1 month     Length of Service (minutes direct face-to-face contact): 60    Diagnosis:     ICD-10-CM ICD-9-CM   1. Moderate episode of recurrent major depressive disorder  F33.1 296.32                Maximo Peters License #[id number]  [place] License #[id number]

## 2023-02-14 NOTE — PROGRESS NOTES
Oncology Nutrition   Gail Mckinnon   1970    Therapeutic Food Pantry     Pt eligible for Therapeutic Food Pantry . Order filled out with patient at chairside. All patient questions or concerns regarding  and/or nutrition status addressed. RD to continue to monitor prn and provide patient food while under active treatment.     Food order filled by this RD- will provide to patient at end of infusion today.    Ciera Richardson, DAVIDN, LDN  02/14/2023  10:27 AM

## 2023-02-14 NOTE — PROGRESS NOTES
Oncology   Chemotherapy Infusion Visit    Quick Social Service Status Follow Up   Met w/ pt briefly to follow up on social and emotional needs. SW followed up with pt regarding plans to move. She is waiting to hear back on an apartment that is being renovated. She is hoping to be able to get this apartment. She has been in contact with the landlord.  Pt did have good news from [facility]. There has been no new growth. She will see the [facility] Doctor on a video call at the end of the week.     No other needs at this time from JULIANNE.      Radha Giordano, JENNIEW  02/14/2023  3:05 PM

## 2023-02-16 ENCOUNTER — INFUSION (OUTPATIENT)
Dept: INFUSION THERAPY | Facility: HOSPITAL | Age: 53
End: 2023-02-16
Attending: INTERNAL MEDICINE
Payer: MEDICAID

## 2023-02-16 VITALS
DIASTOLIC BLOOD PRESSURE: 78 MMHG | TEMPERATURE: 98 F | RESPIRATION RATE: 18 BRPM | HEIGHT: 66 IN | WEIGHT: 293 LBS | SYSTOLIC BLOOD PRESSURE: 119 MMHG | HEART RATE: 80 BPM | BODY MASS INDEX: 47.09 KG/M2

## 2023-02-16 DIAGNOSIS — C18.7 MALIGNANT NEOPLASM OF SIGMOID COLON: Primary | ICD-10-CM

## 2023-02-16 DIAGNOSIS — C77.2 METASTASIS TO INTESTINAL LYMPH NODE: ICD-10-CM

## 2023-02-16 DIAGNOSIS — C18.7 MALIGNANT NEOPLASM OF SIGMOID COLON: ICD-10-CM

## 2023-02-16 DIAGNOSIS — R11.0 NAUSEA: ICD-10-CM

## 2023-02-16 PROCEDURE — 63600175 PHARM REV CODE 636 W HCPCS: Mod: PN | Performed by: INTERNAL MEDICINE

## 2023-02-16 PROCEDURE — 96360 HYDRATION IV INFUSION INIT: CPT | Mod: PN

## 2023-02-16 PROCEDURE — 25000003 PHARM REV CODE 250: Mod: PN | Performed by: INTERNAL MEDICINE

## 2023-02-16 RX ORDER — SODIUM CHLORIDE 0.9 % (FLUSH) 0.9 %
10 SYRINGE (ML) INJECTION
Status: DISCONTINUED | OUTPATIENT
Start: 2023-02-16 | End: 2023-02-16 | Stop reason: HOSPADM

## 2023-02-16 RX ORDER — HEPARIN 100 UNIT/ML
500 SYRINGE INTRAVENOUS
Status: DISCONTINUED | OUTPATIENT
Start: 2023-02-16 | End: 2023-02-16 | Stop reason: HOSPADM

## 2023-02-16 RX ORDER — LORAZEPAM 1 MG/1
1 TABLET ORAL EVERY 12 HOURS PRN
Qty: 30 TABLET | Refills: 0 | Status: SHIPPED | OUTPATIENT
Start: 2023-02-16 | End: 2023-03-20 | Stop reason: SDUPTHER

## 2023-02-16 RX ORDER — SODIUM CHLORIDE 0.9 % (FLUSH) 0.9 %
10 SYRINGE (ML) INJECTION
Status: CANCELLED | OUTPATIENT
Start: 2023-02-16

## 2023-02-16 RX ORDER — HEPARIN 100 UNIT/ML
500 SYRINGE INTRAVENOUS
Status: CANCELLED | OUTPATIENT
Start: 2023-02-16

## 2023-02-16 RX ORDER — SODIUM CHLORIDE 9 MG/ML
1000 INJECTION, SOLUTION INTRAVENOUS
Status: DISCONTINUED | OUTPATIENT
Start: 2023-02-16 | End: 2023-02-16 | Stop reason: HOSPADM

## 2023-02-16 RX ADMIN — Medication 500 UNITS: at 10:02

## 2023-02-16 RX ADMIN — SODIUM CHLORIDE 1000 ML: 9 INJECTION, SOLUTION INTRAVENOUS at 09:02

## 2023-02-16 NOTE — PLAN OF CARE
Patient arrived to unit for discontinuation of home pump. IVFs given over 1 hour per patient request. NAD/no concerns voiced. Reviewed follow-up appointments. All questions were answered, ambulated independently at d/c.

## 2023-02-17 ENCOUNTER — TELEPHONE (OUTPATIENT)
Dept: PSYCHIATRY | Facility: CLINIC | Age: 53
End: 2023-02-17
Payer: MEDICAID

## 2023-02-17 ENCOUNTER — PATIENT MESSAGE (OUTPATIENT)
Dept: PSYCHIATRY | Facility: CLINIC | Age: 53
End: 2023-02-17
Payer: MEDICAID

## 2023-02-27 ENCOUNTER — LAB VISIT (OUTPATIENT)
Dept: LAB | Facility: HOSPITAL | Age: 53
End: 2023-02-27
Attending: INTERNAL MEDICINE
Payer: MEDICAID

## 2023-02-27 ENCOUNTER — OFFICE VISIT (OUTPATIENT)
Dept: HEMATOLOGY/ONCOLOGY | Facility: CLINIC | Age: 53
End: 2023-02-27
Payer: MEDICAID

## 2023-02-27 ENCOUNTER — PATIENT MESSAGE (OUTPATIENT)
Dept: PSYCHIATRY | Facility: CLINIC | Age: 53
End: 2023-02-27
Payer: MEDICAID

## 2023-02-27 VITALS
SYSTOLIC BLOOD PRESSURE: 108 MMHG | HEART RATE: 88 BPM | HEIGHT: 66 IN | WEIGHT: 293 LBS | BODY MASS INDEX: 47.09 KG/M2 | DIASTOLIC BLOOD PRESSURE: 62 MMHG | RESPIRATION RATE: 16 BRPM | OXYGEN SATURATION: 99 % | TEMPERATURE: 98 F

## 2023-02-27 DIAGNOSIS — C18.7 MALIGNANT NEOPLASM OF SIGMOID COLON: ICD-10-CM

## 2023-02-27 DIAGNOSIS — C77.9 SECONDARY ADENOCARCINOMA OF LYMPH NODE: ICD-10-CM

## 2023-02-27 DIAGNOSIS — C18.7 MALIGNANT NEOPLASM OF SIGMOID COLON: Primary | ICD-10-CM

## 2023-02-27 DIAGNOSIS — C77.2 METASTASIS TO INTESTINAL LYMPH NODE: ICD-10-CM

## 2023-02-27 LAB
ALBUMIN SERPL BCP-MCNC: 3.9 G/DL (ref 3.5–5.2)
ALP SERPL-CCNC: 127 U/L (ref 55–135)
ALT SERPL W/O P-5'-P-CCNC: 78 U/L (ref 10–44)
ANION GAP SERPL CALC-SCNC: 6 MMOL/L (ref 8–16)
AST SERPL-CCNC: 54 U/L (ref 10–40)
BASOPHILS # BLD AUTO: 0.05 K/UL (ref 0–0.2)
BASOPHILS NFR BLD: 1.1 % (ref 0–1.9)
BILIRUB SERPL-MCNC: 0.5 MG/DL (ref 0.1–1)
BILIRUB UR QL STRIP: NEGATIVE
BUN SERPL-MCNC: 17 MG/DL (ref 6–20)
CALCIUM SERPL-MCNC: 11.2 MG/DL (ref 8.7–10.5)
CHLORIDE SERPL-SCNC: 107 MMOL/L (ref 95–110)
CLARITY UR: CLEAR
CO2 SERPL-SCNC: 24 MMOL/L (ref 23–29)
COLOR UR: YELLOW
CREAT SERPL-MCNC: 1.1 MG/DL (ref 0.5–1.4)
DIFFERENTIAL METHOD: ABNORMAL
EOSINOPHIL # BLD AUTO: 0.2 K/UL (ref 0–0.5)
EOSINOPHIL NFR BLD: 4 % (ref 0–8)
ERYTHROCYTE [DISTWIDTH] IN BLOOD BY AUTOMATED COUNT: 18.1 % (ref 11.5–14.5)
EST. GFR  (NO RACE VARIABLE): 59.7 ML/MIN/1.73 M^2
GLUCOSE SERPL-MCNC: 98 MG/DL (ref 70–110)
GLUCOSE UR QL STRIP: NEGATIVE
HCT VFR BLD AUTO: 44.2 % (ref 37–48.5)
HGB BLD-MCNC: 14.4 G/DL (ref 12–16)
HGB UR QL STRIP: NEGATIVE
IMM GRANULOCYTES # BLD AUTO: 0.01 K/UL (ref 0–0.04)
IMM GRANULOCYTES NFR BLD AUTO: 0.2 % (ref 0–0.5)
KETONES UR QL STRIP: NEGATIVE
LEUKOCYTE ESTERASE UR QL STRIP: NEGATIVE
LYMPHOCYTES # BLD AUTO: 1.4 K/UL (ref 1–4.8)
LYMPHOCYTES NFR BLD: 30.4 % (ref 18–48)
MCH RBC QN AUTO: 29.4 PG (ref 27–31)
MCHC RBC AUTO-ENTMCNC: 32.6 G/DL (ref 32–36)
MCV RBC AUTO: 90 FL (ref 82–98)
MONOCYTES # BLD AUTO: 0.7 K/UL (ref 0.3–1)
MONOCYTES NFR BLD: 15.1 % (ref 4–15)
NEUTROPHILS # BLD AUTO: 2.3 K/UL (ref 1.8–7.7)
NEUTROPHILS NFR BLD: 49.2 % (ref 38–73)
NITRITE UR QL STRIP: NEGATIVE
NRBC BLD-RTO: 0 /100 WBC
PH UR STRIP: 6 [PH] (ref 5–8)
PLATELET # BLD AUTO: 256 K/UL (ref 150–450)
PMV BLD AUTO: 10.1 FL (ref 9.2–12.9)
POTASSIUM SERPL-SCNC: 4.6 MMOL/L (ref 3.5–5.1)
PROT SERPL-MCNC: 7.2 G/DL (ref 6–8.4)
PROT UR QL STRIP: NEGATIVE
RBC # BLD AUTO: 4.89 M/UL (ref 4–5.4)
SODIUM SERPL-SCNC: 137 MMOL/L (ref 136–145)
SP GR UR STRIP: <=1.005 (ref 1–1.03)
URN SPEC COLLECT METH UR: ABNORMAL
WBC # BLD AUTO: 4.71 K/UL (ref 3.9–12.7)

## 2023-02-27 PROCEDURE — 3078F DIAST BP <80 MM HG: CPT | Mod: CPTII,,, | Performed by: INTERNAL MEDICINE

## 2023-02-27 PROCEDURE — 36415 COLL VENOUS BLD VENIPUNCTURE: CPT | Mod: PN | Performed by: INTERNAL MEDICINE

## 2023-02-27 PROCEDURE — 3008F BODY MASS INDEX DOCD: CPT | Mod: CPTII,,, | Performed by: INTERNAL MEDICINE

## 2023-02-27 PROCEDURE — 99999 PR PBB SHADOW E&M-EST. PATIENT-LVL IV: ICD-10-PCS | Mod: PBBFAC,,, | Performed by: INTERNAL MEDICINE

## 2023-02-27 PROCEDURE — 3008F PR BODY MASS INDEX (BMI) DOCUMENTED: ICD-10-PCS | Mod: CPTII,,, | Performed by: INTERNAL MEDICINE

## 2023-02-27 PROCEDURE — 99215 PR OFFICE/OUTPT VISIT, EST, LEVL V, 40-54 MIN: ICD-10-PCS | Mod: S$PBB,,, | Performed by: INTERNAL MEDICINE

## 2023-02-27 PROCEDURE — 99215 OFFICE O/P EST HI 40 MIN: CPT | Mod: S$PBB,,, | Performed by: INTERNAL MEDICINE

## 2023-02-27 PROCEDURE — 4010F PR ACE/ARB THEARPY RXD/TAKEN: ICD-10-PCS | Mod: CPTII,,, | Performed by: INTERNAL MEDICINE

## 2023-02-27 PROCEDURE — 99999 PR PBB SHADOW E&M-EST. PATIENT-LVL IV: CPT | Mod: PBBFAC,,, | Performed by: INTERNAL MEDICINE

## 2023-02-27 PROCEDURE — 99214 OFFICE O/P EST MOD 30 MIN: CPT | Mod: PBBFAC,PN | Performed by: INTERNAL MEDICINE

## 2023-02-27 PROCEDURE — 3074F SYST BP LT 130 MM HG: CPT | Mod: CPTII,,, | Performed by: INTERNAL MEDICINE

## 2023-02-27 PROCEDURE — 1159F PR MEDICATION LIST DOCUMENTED IN MEDICAL RECORD: ICD-10-PCS | Mod: CPTII,,, | Performed by: INTERNAL MEDICINE

## 2023-02-27 PROCEDURE — 4010F ACE/ARB THERAPY RXD/TAKEN: CPT | Mod: CPTII,,, | Performed by: INTERNAL MEDICINE

## 2023-02-27 PROCEDURE — 80053 COMPREHEN METABOLIC PANEL: CPT | Mod: PN | Performed by: INTERNAL MEDICINE

## 2023-02-27 PROCEDURE — 85025 COMPLETE CBC W/AUTO DIFF WBC: CPT | Mod: PN | Performed by: INTERNAL MEDICINE

## 2023-02-27 PROCEDURE — 81003 URINALYSIS AUTO W/O SCOPE: CPT | Mod: PN | Performed by: INTERNAL MEDICINE

## 2023-02-27 PROCEDURE — 3074F PR MOST RECENT SYSTOLIC BLOOD PRESSURE < 130 MM HG: ICD-10-PCS | Mod: CPTII,,, | Performed by: INTERNAL MEDICINE

## 2023-02-27 PROCEDURE — 3078F PR MOST RECENT DIASTOLIC BLOOD PRESSURE < 80 MM HG: ICD-10-PCS | Mod: CPTII,,, | Performed by: INTERNAL MEDICINE

## 2023-02-27 PROCEDURE — 1159F MED LIST DOCD IN RCRD: CPT | Mod: CPTII,,, | Performed by: INTERNAL MEDICINE

## 2023-02-27 RX ORDER — SODIUM CHLORIDE 9 MG/ML
1000 INJECTION, SOLUTION INTRAVENOUS
Status: CANCELLED | OUTPATIENT
Start: 2023-03-02

## 2023-02-27 RX ORDER — SODIUM CHLORIDE 0.9 % (FLUSH) 0.9 %
10 SYRINGE (ML) INJECTION
Status: CANCELLED | OUTPATIENT
Start: 2023-03-02

## 2023-02-27 RX ORDER — CAPECITABINE 500 MG/1
1000 TABLET, FILM COATED ORAL 2 TIMES DAILY
Qty: 140 TABLET | Refills: 1 | Status: SHIPPED | OUTPATIENT
Start: 2023-02-27 | End: 2023-04-12

## 2023-02-27 RX ORDER — SODIUM CHLORIDE 0.9 % (FLUSH) 0.9 %
10 SYRINGE (ML) INJECTION
Status: CANCELLED | OUTPATIENT
Start: 2023-02-28

## 2023-02-27 RX ORDER — LORAZEPAM 2 MG/ML
1 INJECTION INTRAMUSCULAR
Status: CANCELLED | OUTPATIENT
Start: 2023-02-28 | End: 2023-02-28

## 2023-02-27 RX ORDER — HEPARIN 100 UNIT/ML
500 SYRINGE INTRAVENOUS
Status: CANCELLED | OUTPATIENT
Start: 2023-02-28

## 2023-02-27 RX ORDER — HEPARIN 100 UNIT/ML
500 SYRINGE INTRAVENOUS
Status: CANCELLED | OUTPATIENT
Start: 2023-03-02

## 2023-02-27 RX ORDER — ATROPINE SULFATE 0.4 MG/ML
0.4 INJECTION, SOLUTION ENDOTRACHEAL; INTRAMEDULLARY; INTRAMUSCULAR; INTRAVENOUS; SUBCUTANEOUS ONCE AS NEEDED
Status: CANCELLED | OUTPATIENT
Start: 2023-02-28

## 2023-02-27 NOTE — PROGRESS NOTES
PROGRESS NOTE    Subjective:       Patient ID: Gail Mckinnon is a 52 y.o. female.  MRN: [id number]  : 1970    Chief Complaint: Malignant neoplasm of sigmoid colon (Follow Up with labs, tx on )      History of Present Illness:   Gail Mckinnon is a 52 y.o. female who presents with colon cancer, initially stage III and now with LN recurrence.       She completed adjuvant FOLFOX in 2020. She tolerated only 4 cycles of FOLFOX before she developed an infusion reaction to oxaliplatin in cycle 5 was well as cycle 6. She then completed 6 cycles of infusional 5 FU.     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 She was offered second line therapy with FOLFIRI and Avastin.      She presented to the ED on  with left flank pain. CT renal stone study showed Moderate hydronephrosis on the left secondary to 3 mm calculus at the UPJ.    She had a PET scan mid April that showed possible progression of her disease with      She has been to [facility] for another opinion. No change was recommended in systemic therapy but she was offered surgical removal of the aorta caval nodes.  Recent sans at [facility]  show stable disease.      Surgery done 22. Received 2 more cycle of FOLFIRI without Avastin.     She had repeat imaging at [facility] on 22 that unfortunately showed disease progression since her surgery with multiple RP nodes and one mediastinal node.       PET 22  Impression:     1. Progression of disease with interval increase of abdominal and pelvic lymphadenopathy.  2. New area of moderate FDG uptake at the right half of the T9 vertebral body (image 124).  Favor degenerative disc changes.  No underlying osteolytic or osteoblastic lesion.  Recommend continued attention on follow-up.  3. No other evidence of FDG avid disease.     22  Impression After scan comparison:     1. Metastatic portacaval and left  periaortic retroperitoneal lymph nodes which remain stable between the CT of 09/24/2022 and the subsequent PET-CT of 12/08/2022.  There is no evidence of progression of metastatic disease.  2. Nonobstructing bilateral renal calculi and cholelithiasis.    2/10/22:  CT chest abd pelvis  Impression:     Stable periportal, retroperitoneal and mesenteric lymphadenopathy compared to PET-CT 12/08/2022.     Stable right middle lobe 3 mm pulmonary nodule.  No new or enlarging pulmonary nodule.     Hepatic steatosis.  Hepatosplenomegaly.     Cholelithiasis.     Bilateral nonobstructing nephrolithiasis.     Small hiatal hernia with retained contrast in the distal esophagus.  Correlate for reflux.     Interim history:  She has continued FOLFIRI and Avastin and is here  to obtain clearance for ongoing therapy.   Had persistent URI symptoms for 2 weeks, was treated with doxycycline for 7 days, improving.     She had virtual visit at [facility] on 2/17/23.     Reports stable fatigue, intermittent nausea and anxiety. She has lost some weight, intentional, is watching her diet.   She is following up with Dr. Palm.         Oncology History:  Oncology History   Malignant neoplasm of sigmoid colon   3/16/2020 Initial Diagnosis    Malignant neoplasm of sigmoid colon     3/31/2020 Cancer Staged    Staging form: Colon and Rectum, AJCC 8th Edition  - Clinical stage from 3/31/2020: Stage IIIC (cT4b, cN2a, cM0)       5/6/2020 - 11/17/2020 Chemotherapy    Treatment Summary   Plan Name: OP FOLFOX 6 Q2W  Treatment Goal: Curative  Status: Inactive  Start Date: 5/6/2020  End Date: 11/6/2020  Provider: Dylan Leyva MD  Chemotherapy: fluorouraciL injection 945 mg, 400 mg/m2 = 945 mg, Intravenous, Clinic/HOD 1 time, 14 of 14 cycles  Administration: 945 mg (5/6/2020), 945 mg (5/20/2020), 945 mg (6/3/2020), 945 mg (6/17/2020), 945 mg (7/29/2020), 945 mg (8/12/2020), 945 mg (8/26/2020), 945 mg (9/9/2020), 945 mg (9/23/2020), 945 mg (10/6/2020), 945 mg  (10/21/2020), 945 mg (11/4/2020)  fluorouraciL 2,400 mg/m2 = 5,665 mg in sodium chloride 0.9% 240 mL chemo infusion, 2,400 mg/m2 = 5,665 mg, Intravenous, Over 46 hours, 14 of 14 cycles  Administration: 5,665 mg (5/6/2020), 5,665 mg (5/20/2020), 5,665 mg (6/3/2020), 5,665 mg (6/17/2020), 5,665 mg (7/29/2020), 5,665 mg (8/12/2020), 5,665 mg (8/26/2020), 5,665 mg (9/9/2020), 5,665 mg (9/23/2020), 5,665 mg (10/6/2020), 5,665 mg (10/21/2020), 5,665 mg (11/4/2020)  leucovorin calcium 900 mg in dextrose 5 % 250 mL infusion, 945 mg, Intravenous, Clinic/HOD 1 time, 14 of 14 cycles  Administration: 900 mg (5/6/2020), 900 mg (5/20/2020), 900 mg (6/3/2020), 900 mg (6/17/2020), 945 mg (6/30/2020), 945 mg (7/20/2020), 945 mg (7/29/2020), 945 mg (8/12/2020), 945 mg (8/26/2020), 945 mg (9/9/2020), 945 mg (9/23/2020), 945 mg (10/6/2020), 945 mg (10/21/2020), 945 mg (11/4/2020)  oxaliplatin (ELOXATIN) 200 mg in dextrose 5 % 500 mL chemo infusion, 201 mg, Intravenous, Clinic/HOD 1 time, 6 of 6 cycles  Dose modification: 65 mg/m2 (original dose 85 mg/m2, Cycle 6)  Administration: 200 mg (5/6/2020), 200 mg (5/20/2020), 200 mg (6/3/2020), 200 mg (6/17/2020), 201 mg (6/30/2020), 150 mg (7/20/2020)       6/16/2021 -  Chemotherapy    Treatment Summary   Plan Name: OP COLORECTAL FOLFIRI + BEVACIZUMAB Q2W  Treatment Goal: Control  Status: Active  Start Date: 6/16/2021  End Date: 5/11/2023 (Planned)  Provider: Marah Santo MD  Chemotherapy: fluorouraciL injection 990 mg, 400 mg/m2 = 990 mg, Intravenous, Clinic/HOD 1 time, 15 of 15 cycles  Administration: 990 mg (6/16/2021), 990 mg (6/30/2021), 990 mg (7/14/2021), 980 mg (7/28/2021), 990 mg (8/11/2021), 990 mg (8/25/2021), 990 mg (9/8/2021), 990 mg (9/22/2021), 990 mg (10/6/2021), 990 mg (10/20/2021), 990 mg (11/3/2021), 990 mg (12/1/2021), 990 mg (12/15/2021), 990 mg (11/17/2021), 990 mg (12/28/2021)  fluorouraciL 2,400 mg/m2 = 5,950 mg in sodium chloride 0.9% 240 mL chemo infusion, 2,400  mg/m2 = 5,950 mg, Intravenous, Over 46 hours, 35 of 41 cycles  Administration: 5,950 mg (6/16/2021), 5,950 mg (6/30/2021), 5,950 mg (7/14/2021), 5,880 mg (7/28/2021), 5,930 mg (8/11/2021), 5,930 mg (8/25/2021), 5,930 mg (9/8/2021), 5,930 mg (9/22/2021), 5,930 mg (10/6/2021), 5,930 mg (10/20/2021), 5,930 mg (11/3/2021), 5,930 mg (12/1/2021), 5,930 mg (12/15/2021), 5,930 mg (11/17/2021), 5,930 mg (12/28/2021), 6,050 mg (5/11/2022), 6,025 mg (3/9/2022), 6,025 mg (2/23/2022), 5,930 mg (2/2/2022), 5,930 mg (1/19/2022), 6,050 mg (4/20/2022), 6,025 mg (4/6/2022), 6,025 mg (3/23/2022), 6,050 mg (6/1/2022), 6,050 mg (6/15/2022), 6,050 mg (10/19/2022), 6,050 mg (10/5/2022), 6,050 mg (11/2/2022), 6,050 mg (11/16/2022), 6,050 mg (11/30/2022), 6,050 mg (1/4/2023), 6,050 mg (12/19/2022), 6,050 mg (1/18/2023), 6,050 mg (2/14/2023), 6,000 mg (2/1/2023)  bevacizumab (AVASTIN) 600 mg in sodium chloride 0.9% 100 mL chemo infusion, 660 mg, Intravenous, Clinic/HOD 1 time, 33 of 39 cycles  Dose modification: 5 mg/kg (original dose 5 mg/kg, Cycle 26, Reason: MD Discretion, Comment: 5 mg/kg original dose)  Administration: 600 mg (6/30/2021), 600 mg (7/14/2021), 600 mg (7/28/2021), 600 mg (6/16/2021), 600 mg (8/11/2021), 655 mg (8/25/2021), 600 mg (9/8/2021), 600 mg (9/22/2021), 600 mg (10/6/2021), 600 mg (10/20/2021), 600 mg (11/3/2021), 655 mg (12/1/2021), 600 mg (12/15/2021), 600 mg (11/17/2021), 600 mg (12/28/2021), 600 mg (5/11/2022), 600 mg (3/9/2022), 600 mg (2/23/2022), 600 mg (2/2/2022), 600 mg (1/19/2022), 600 mg (4/20/2022), 680 mg (4/6/2022), 600 mg (3/23/2022), 680 mg (10/5/2022), 680 mg (10/19/2022), 680 mg (11/2/2022), 680 mg (11/16/2022), 680 mg (11/30/2022), 680 mg (1/4/2023), 680 mg (12/19/2022), 680 mg (1/18/2023), 680 mg (2/14/2023), 680 mg (2/1/2023)  irinotecan (CAMPTOSAR) 440 mg in sodium chloride 0.9% 500 mL chemo infusion, 446 mg, Intravenous, Clinic/HOD 1 time, 35 of 41 cycles  Dose modification: 180 mg/m2  (original dose 180 mg/m2, Cycle 24)  Administration: 440 mg (6/16/2021), 440 mg (6/30/2021), 440 mg (7/14/2021), 440 mg (7/28/2021), 440 mg (8/11/2021), 444 mg (8/25/2021), 440 mg (9/8/2021), 440 mg (9/22/2021), 440 mg (10/6/2021), 440 mg (10/20/2021), 440 mg (11/3/2021), 440 mg (12/1/2021), 440 mg (12/15/2021), 440 mg (11/17/2021), 440 mg (12/28/2021), 440 mg (5/11/2022), 440 mg (3/9/2022), 440 mg (2/23/2022), 440 mg (2/2/2022), 440 mg (1/19/2022), 440 mg (4/20/2022), 440 mg (4/6/2022), 440 mg (3/24/2022), 440 mg (6/1/2022), 440 mg (6/15/2022), 440 mg (10/19/2022), 440 mg (10/5/2022), 440 mg (11/2/2022), 440 mg (11/16/2022), 440 mg (11/30/2022), 440 mg (1/4/2023), 440 mg (12/19/2022), 440 mg (1/18/2023), 440 mg (2/14/2023), 440 mg (2/1/2023)       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   4/3/2020 Initial Diagnosis    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     5/6/2020 - 11/17/2020 Chemotherapy    Treatment Summary   Plan Name: OP FOLFOX 6 Q2W  Treatment Goal: Curative  Status: Inactive  Start Date: 5/6/2020  End Date: 11/6/2020  Provider: Dylan Leyva MD  Chemotherapy: fluorouraciL injection 945 mg, 400 mg/m2 = 945 mg, Intravenous, Clinic/HOD 1 time, 14 of 14 cycles  Administration: 945 mg (5/6/2020), 945 mg (5/20/2020), 945 mg (6/3/2020), 945 mg (6/17/2020), 945 mg (7/29/2020), 945 mg (8/12/2020), 945 mg (8/26/2020), 945 mg (9/9/2020), 945 mg (9/23/2020), 945 mg (10/6/2020), 945 mg (10/21/2020), 945 mg (11/4/2020)  fluorouraciL 2,400 mg/m2 = 5,665 mg in sodium chloride 0.9% 240 mL chemo infusion, 2,400 mg/m2 = 5,665 mg, Intravenous, Over 46 hours, 14 of 14 cycles  Administration: 5,665 mg (5/6/2020), 5,665 mg (5/20/2020), 5,665 mg (6/3/2020), 5,665 mg (6/17/2020), 5,665 mg (7/29/2020), 5,665 mg (8/12/2020), 5,665 mg (8/26/2020), 5,665 mg (9/9/2020), 5,665 mg (9/23/2020), 5,665 mg (10/6/2020), 5,665 mg (10/21/2020), 5,665 mg (11/4/2020)  leucovorin calcium 900 mg in dextrose 5 % 250 mL infusion, 945 mg, Intravenous,  Clinic/HOD 1 time, 14 of 14 cycles  Administration: 900 mg (5/6/2020), 900 mg (5/20/2020), 900 mg (6/3/2020), 900 mg (6/17/2020), 945 mg (6/30/2020), 945 mg (7/20/2020), 945 mg (7/29/2020), 945 mg (8/12/2020), 945 mg (8/26/2020), 945 mg (9/9/2020), 945 mg (9/23/2020), 945 mg (10/6/2020), 945 mg (10/21/2020), 945 mg (11/4/2020)  oxaliplatin (ELOXATIN) 200 mg in dextrose 5 % 500 mL chemo infusion, 201 mg, Intravenous, Clinic/HOD 1 time, 6 of 6 cycles  Dose modification: 65 mg/m2 (original dose 85 mg/m2, Cycle 6)  Administration: 200 mg (5/6/2020), 200 mg (5/20/2020), 200 mg (6/3/2020), 200 mg (6/17/2020), 201 mg (6/30/2020), 150 mg (7/20/2020)       6/16/2021 -  Chemotherapy    Treatment Summary   Plan Name: OP COLORECTAL FOLFIRI + BEVACIZUMAB Q2W  Treatment Goal: Control  Status: Active  Start Date: 6/16/2021  End Date: 5/11/2023 (Planned)  Provider: Marah Santo MD  Chemotherapy: fluorouraciL injection 990 mg, 400 mg/m2 = 990 mg, Intravenous, Clinic/HOD 1 time, 15 of 15 cycles  Administration: 990 mg (6/16/2021), 990 mg (6/30/2021), 990 mg (7/14/2021), 980 mg (7/28/2021), 990 mg (8/11/2021), 990 mg (8/25/2021), 990 mg (9/8/2021), 990 mg (9/22/2021), 990 mg (10/6/2021), 990 mg (10/20/2021), 990 mg (11/3/2021), 990 mg (12/1/2021), 990 mg (12/15/2021), 990 mg (11/17/2021), 990 mg (12/28/2021)  fluorouraciL 2,400 mg/m2 = 5,950 mg in sodium chloride 0.9% 240 mL chemo infusion, 2,400 mg/m2 = 5,950 mg, Intravenous, Over 46 hours, 35 of 41 cycles  Administration: 5,950 mg (6/16/2021), 5,950 mg (6/30/2021), 5,950 mg (7/14/2021), 5,880 mg (7/28/2021), 5,930 mg (8/11/2021), 5,930 mg (8/25/2021), 5,930 mg (9/8/2021), 5,930 mg (9/22/2021), 5,930 mg (10/6/2021), 5,930 mg (10/20/2021), 5,930 mg (11/3/2021), 5,930 mg (12/1/2021), 5,930 mg (12/15/2021), 5,930 mg (11/17/2021), 5,930 mg (12/28/2021), 6,050 mg (5/11/2022), 6,025 mg (3/9/2022), 6,025 mg (2/23/2022), 5,930 mg (2/2/2022), 5,930 mg (1/19/2022), 6,050 mg (4/20/2022),  6,025 mg (4/6/2022), 6,025 mg (3/23/2022), 6,050 mg (6/1/2022), 6,050 mg (6/15/2022), 6,050 mg (10/19/2022), 6,050 mg (10/5/2022), 6,050 mg (11/2/2022), 6,050 mg (11/16/2022), 6,050 mg (11/30/2022), 6,050 mg (1/4/2023), 6,050 mg (12/19/2022), 6,050 mg (1/18/2023), 6,050 mg (2/14/2023), 6,000 mg (2/1/2023)  bevacizumab (AVASTIN) 600 mg in sodium chloride 0.9% 100 mL chemo infusion, 660 mg, Intravenous, [facility] 1 time, 33 of 39 cycles  Dose modification: 5 mg/kg (original dose 5 mg/kg, Cycle 26, Reason: MD Discretion, Comment: 5 mg/kg original dose)  Administration: 600 mg (6/30/2021), 600 mg (7/14/2021), 600 mg (7/28/2021), 600 mg (6/16/2021), 600 mg (8/11/2021), 655 mg (8/25/2021), 600 mg (9/8/2021), 600 mg (9/22/2021), 600 mg (10/6/2021), 600 mg (10/20/2021), 600 mg (11/3/2021), 655 mg (12/1/2021), 600 mg (12/15/2021), 600 mg (11/17/2021), 600 mg (12/28/2021), 600 mg (5/11/2022), 600 mg (3/9/2022), 600 mg (2/23/2022), 600 mg (2/2/2022), 600 mg (1/19/2022), 600 mg (4/20/2022), 680 mg (4/6/2022), 600 mg (3/23/2022), 680 mg (10/5/2022), 680 mg (10/19/2022), 680 mg (11/2/2022), 680 mg (11/16/2022), 680 mg (11/30/2022), 680 mg (1/4/2023), 680 mg (12/19/2022), 680 mg (1/18/2023), 680 mg (2/14/2023), 680 mg (2/1/2023)  irinotecan (CAMPTOSAR) 440 mg in sodium chloride 0.9% 500 mL chemo infusion, 446 mg, Intravenous, Clinic/[facility] 1 time, 35 of 41 cycles  Dose modification: 180 mg/m2 (original dose 180 mg/m2, Cycle 24)  Administration: 440 mg (6/16/2021), 440 mg (6/30/2021), 440 mg (7/14/2021), 440 mg (7/28/2021), 440 mg (8/11/2021), 444 mg (8/25/2021), 440 mg (9/8/2021), 440 mg (9/22/2021), 440 mg (10/6/2021), 440 mg (10/20/2021), 440 mg (11/3/2021), 440 mg (12/1/2021), 440 mg (12/15/2021), 440 mg (11/17/2021), 440 mg (12/28/2021), 440 mg (5/11/2022), 440 mg (3/9/2022), 440 mg (2/23/2022), 440 mg (2/2/2022), 440 mg (1/19/2022), 440 mg (4/20/2022), 440 mg (4/6/2022), 440 mg (3/24/2022), 440 mg (6/1/2022), 440 mg  (6/15/2022), 440 mg (10/19/2022), 440 mg (10/5/2022), 440 mg (2022), 440 mg (2022), 440 mg (2022), 440 mg (2023), 440 mg (2022), 440 mg (2023), 440 mg (2023), 440 mg (2023)           History:  Past Medical History:   Diagnosis Date    Anxiety     Depression     FH: ovarian cancer 3/16/2020    Hx of psychiatric care     Effexor, Paxil, Lexapro, Zoloft, Wellbutrin, Trazodone Buspar    Hyperthyroidism     Hypothyroid     Kidney calculi     Malignant neoplasm of sigmoid colon 3/16/2020    Menorrhagia     Multinodular goiter 2012    Palpitation     Psychiatric problem     Venous insufficiency       Past Surgical History:   Procedure Laterality Date     SECTION, CLASSIC      x3    COLONOSCOPY N/A 2020    Procedure: COLONOSCOPY;  Surgeon: Shane Parker MD;  Location: [facility];  Service: Endoscopy;  Laterality: N/A;    COLONOSCOPY N/A 2022    Procedure: COLONOSCOPY;  Surgeon: Shane Parker MD;  Location: [facility];  Service: Endoscopy;  Laterality: N/A;    CYSTOSCOPY W/ URETERAL STENT PLACEMENT Bilateral 3/25/2020    Procedure: CYSTOSCOPY, WITH URETERAL STENT INSERTION;  Surgeon: Claudio Tyson MD;  Location: [facility] OR [address] [place] [address];  Service: Urology;  Laterality: Bilateral;    INSERTION OF TUNNELED CENTRAL VENOUS CATHETER (CVC) WITH SUBCUTANEOUS PORT N/A 2020    Procedure: [id number];  Surgeon: [facility] Diagnostic Provider;  Location: [facility] OR [address] [place] [address];  Service: Radiology;  Laterality: N/A;  Room 189/Cindy    LAPAROSCOPIC SIGMOIDECTOMY N/A 3/25/2020    Procedure: COLECTOMY, SIGMOID, LAPAROSCOPIC, flex sig, ERAS high;  Surgeon: Silvio Man MD;  Location: [facility] OR [address] [place] [address];  Service: Colon and Rectal;  Laterality: N/A;    MOBILIZATION OF SPLENIC FLEXURE  3/25/2020    Procedure: MOBILIZATION, SPLENIC FLEXURE;  Surgeon: Silvio Man MD;  Location: [facility] OR 2ND FLR;  Service: Colon and Rectal;;    tonsillectomy      TOTAL  ABDOMINAL HYSTERECTOMY W/ BILATERAL SALPINGOOPHORECTOMY N/A 3/25/2020    Procedure: HYSTERECTOMY, TOTAL, ABDOMINAL, WITH BILATERAL SALPINGO-OOPHORECTOMY;  Surgeon: Keron Brady MD;  Location: [facility] OR [address] [place] [address];  Service: Oncology;  Laterality: N/A;     Family History   Problem Relation Age of Onset    Heart disease Father     Diabetes Mother 65    Drug abuse Brother     Drug abuse Brother     Cancer Maternal Aunt         lung cancer    Cancer Maternal Grandmother         stomach cancer- started in ovaries    Ovarian cancer Maternal Grandmother         stomach cancer- started in ovaries    Colon cancer Paternal Uncle     Cancer Paternal Uncle     Ovarian cancer Maternal Aunt     Ovarian cancer Maternal Aunt     Ovarian cancer Cousin         mother had ovarian cancer    Drug abuse Son     Drug abuse Son         clean/sober since 2012    Colon cancer Son     No Known Problems Daughter     Uterine cancer Neg Hx     Breast cancer Neg Hx      Social History     Tobacco Use    Smoking status: Never    Smokeless tobacco: Never   Substance and Sexual Activity    Alcohol use: Yes     Comment: occasionally- twice monthly    Drug use: Never    Sexual activity: Yes     Partners: Male     Birth control/protection: See Surgical Hx        ROS:   Review of Systems   Constitutional:  Positive for malaise/fatigue (first few days). Negative for fever and weight loss.   HENT:  Negative for congestion, hearing loss, nosebleeds and sore throat.    Eyes:  Negative for double vision and photophobia.   Respiratory:  Negative for hemoptysis, sputum production, shortness of breath and wheezing.    Cardiovascular:  Negative for chest pain, palpitations, orthopnea and leg swelling.   Gastrointestinal:  Positive for nausea (improving). Negative for abdominal pain, blood in stool, constipation, diarrhea, heartburn and vomiting.   Genitourinary:  Negative for dysuria, frequency, hematuria and urgency.   Musculoskeletal:  Negative for back pain,  "joint pain and myalgias.   Skin:  Negative for itching and rash.   Neurological:  Negative for dizziness, tingling, seizures, weakness and headaches.   Endo/Heme/Allergies:  Negative for polydipsia. Does not bruise/bleed easily.   Psychiatric/Behavioral:  Negative for depression and memory loss. The patient is nervous/anxious. The patient does not have insomnia.       Objective:     Vitals:    02/27/23 1252   BP: 108/62   Pulse: 88   Resp: 16   Temp: 97.9 °F (36.6 °C)   TempSrc: Temporal   SpO2: 99%   Weight: 133 kg (293 lb 3.4 oz)   Height: 5' 6" (1.676 m)   PainSc: 0-No pain         Wt Readings from Last 10 Encounters:   02/27/23 133 kg (293 lb 3.4 oz)   02/16/23 (!) 137.2 kg (302 lb 7.5 oz)   02/14/23 (!) 137.2 kg (302 lb 7.5 oz)   02/13/23 (P) 135.1 kg (297 lb 13.5 oz)   02/03/23 (!) 138.2 kg (304 lb 10.8 oz)   02/01/23 (!) 138.2 kg (304 lb 10.8 oz)   01/30/23 (!) 136.2 kg (300 lb 4.3 oz)   01/20/23 (!) 137.5 kg (303 lb 2.1 oz)   01/18/23 (!) 137.5 kg (303 lb 2.1 oz)   01/18/23 (!) 137.5 kg (303 lb 2.1 oz)       Physical Examination:   Physical Exam  Vitals and nursing note reviewed.   Constitutional:       General: She is not in acute distress.     Appearance: She is not diaphoretic.   HENT:      Head: Normocephalic.      Mouth/Throat:      Pharynx: No oropharyngeal exudate.   Eyes:      General: No scleral icterus.     Conjunctiva/sclera: Conjunctivae normal.   Neck:      Thyroid: No thyromegaly.   Cardiovascular:      Rate and Rhythm: Normal rate and regular rhythm.      Heart sounds: Normal heart sounds. No murmur heard.  Pulmonary:      Effort: Pulmonary effort is normal. No respiratory distress.      Breath sounds: No stridor. No wheezing or rales.   Chest:      Chest wall: No tenderness.   Abdominal:      General: Bowel sounds are normal. There is no distension.      Palpations: Abdomen is soft. There is no mass.      Tenderness: There is no abdominal tenderness. There is no rebound.   Musculoskeletal:    "      General: No tenderness or deformity. Normal range of motion.      Cervical back: Neck supple.   Lymphadenopathy:      Cervical: No cervical adenopathy.   Skin:     General: Skin is warm and dry.      Findings: No erythema or rash.   Neurological:      Mental Status: She is alert and oriented to person, place, and time.      Cranial Nerves: No cranial nerve deficit.      Coordination: Coordination normal.      Gait: Gait is intact.   Psychiatric:         Mood and Affect: Affect normal.         Cognition and Memory: Memory normal.         Judgment: Judgment normal.        Diagnostic Tests:  Significant Imaging: I have reviewed and interpreted all pertinent imaging results/findings.  Laboratory Data:  All pertinent labs have been reviewed.  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 Oxaliplatin was stopped due to severe adverse reaction.     Follow-up CTs and PET in May 2021 showed enlarging retroperitoneal node, concerning for metastatic disease.      She started FOLFIRI + Avastin and has continued till July 2022.   Given isolated RP toni disease, she has undergone open Left periaortic and aortocaval lymph node dissection on 7/12/2022.     Follow up scans showed disease progression. I have discussed the case with Dr. Montelongo at [facility]. We discussed resuming  FOLFIRI-debo vs transitioning to BRAF directed therapy (BEACON).   There may be an option ton enroll in SWOG 2107 (encorafenib/cetuximab +/- nivo) at [facility] if no prior BRAF inhibitor exposure.[facility] is working with the patient's insurance to see if she can be enrolled there.      Patient is interested in the trial and therefore we resumed FOLFIRI- Debo. 2 follow up scans show stable disease and we will continue. [facility] follow up note reviewed. No other recommendations at this time.       If she has progressive disease and unable to got to [facility] and trial not available locally, will treat with next line treatment with encorafenib and cetuximab.     We discussed a nationwide shortage of 5 FU and that we will switch her to capecitabine while 5 FU becomes available. She is approved to receive 5 FU infusion for this cycle . As per our hospital practice guidelines, I will offer her  capecitabine 1000 mg/m2 bid 7 days on and 7 days off to be started with the following cycle 3/13.     Nausea   Improving on compazine.     Mucositis   Continue [facility] soln.     Transaminitis  Fluctuates.  Continue to monitor. No obvious liver mets.     Hypercalcemia   Mild, secondary to hyperparathyroidism.  Avoid calcium supplements.     Hypothyroidism  Multinodular goiter   Continue Levothyroxine. Endocrine consult is appreciated.   Had a non diagnostic biopsy in 2012, usg findings have remained stable. However, given uptake on PET this year, an FNA vs repeat usg in 6 months is recommended. Will follow closely.     Essential HTN   Continue antihypertensives.      Anxiety   Continue to  follow up with Dr. Palm.     MDM includes  :    - Acute or chronic illness or injury that poses a threat to life or bodily function  - Independent review and explanation of 3+ results from unique tests  - Discussion of management and ordering 3+ unique tests  - Extensive discussion of treatment and management  - Prescription drug management  - Drug therapy requiring  intensive monitoring for toxicity          ECOG SCORE               Discussion:   No follow-ups on file.    Plan was discussed with the patient at length, and she verbalized understanding. Gali was given an opportunity to ask questions that were answered to her satisfaction, and she was advised to call in the interval if any problems or questions arise.    Electronically signed by Marah Santo MD        Route Chart for Scheduling  Med Onc Route Chart for Scheduling    Treatment Plan Information   OP COLORECTAL FOLFIRI + BEVACIZUMAB Q2W   Marah Santo MD   Upcoming Treatment Dates - OP COLORECTAL FOLFIRI + BEVACIZUMAB Q2W    2/28/2023       Pre-Medications       LORazepam injection 1 mg       promethazine (PHENERGAN) 6.25 mg in dextrose 5 % 100 mL IVPB       palonosetron 0.25mg/dexAMETHasone 20mg in NS IVPB       Chemotherapy       bevacizumab (AVASTIN) 5 mg/kg = 680 mg in sodium chloride 0.9% 100 mL chemo infusion       irinotecan (CAMPTOSAR) 440 mg in sodium chloride 0.9% 522 mL chemo infusion       leucovorin calcium 400 mg/m2 = 1,010 mg in dextrose 5 % 250 mL infusion       fluorouracil (ADRUCIL) 2,400 mg/m2 = 6,050 mg in sodium chloride 0.9% 240 mL chemo infusion       Supportive Care       atropine injection 0.4 mg  3/14/2023       Pre-Medications       LORazepam injection 1 mg       promethazine (PHENERGAN) 6.25 mg in dextrose 5 % 100 mL IVPB       palonosetron 0.25mg/dexAMETHasone 20mg in NS IVPB       Chemotherapy       bevacizumab (AVASTIN) 5 mg/kg = 680 mg in sodium chloride 0.9% 100 mL chemo infusion       irinotecan (CAMPTOSAR) 440 mg in sodium chloride 0.9% 522 mL chemo infusion       leucovorin calcium 400 mg/m2 = 1,010 mg in dextrose 5 % 250 mL infusion       fluorouracil (ADRUCIL) 2,400 mg/m2 = 6,050 mg in sodium chloride 0.9% 240 mL chemo infusion       Supportive Care       atropine injection 0.4 mg  3/28/2023       Pre-Medications       LORazepam injection 1 mg       promethazine  (PHENERGAN) 6.25 mg in dextrose 5 % 100 mL IVPB       palonosetron 0.25mg/dexAMETHasone 20mg in NS IVPB       Chemotherapy       bevacizumab (AVASTIN) 5 mg/kg = 680 mg in sodium chloride 0.9% 100 mL chemo infusion       irinotecan (CAMPTOSAR) 440 mg in sodium chloride 0.9% 522 mL chemo infusion       leucovorin calcium 400 mg/m2 = 1,010 mg in dextrose 5 % 250 mL infusion       fluorouracil (ADRUCIL) 2,400 mg/m2 = 6,050 mg in sodium chloride 0.9% 240 mL chemo infusion       Supportive Care       atropine injection 0.4 mg  4/11/2023       Pre-Medications       LORazepam injection 1 mg       promethazine (PHENERGAN) 6.25 mg in dextrose 5 % 100 mL IVPB       palonosetron 0.25mg/dexAMETHasone 20mg in NS IVPB       Chemotherapy       bevacizumab (AVASTIN) 5 mg/kg = 680 mg in sodium chloride 0.9% 100 mL chemo infusion       irinotecan (CAMPTOSAR) 440 mg in sodium chloride 0.9% 522 mL chemo infusion       leucovorin calcium 400 mg/m2 = 1,010 mg in dextrose 5 % 250 mL infusion       fluorouracil (ADRUCIL) 2,400 mg/m2 = 6,050 mg in sodium chloride 0.9% 240 mL chemo infusion       Supportive Care       atropine injection 0.4 mg    Supportive Plan Information  IV FLUIDS AND ELECTROLYTES   Brayden Solo MD   Upcoming Treatment Dates - IV FLUIDS AND ELECTROLYTES    No upcoming days in selected categories.    Therapy Plan Information  PORT FLUSH  Flushes  heparin, porcine (PF) 100 unit/mL injection flush 500 Units  500 Units, Intravenous, Every visit  sodium chloride 0.9% flush 10 mL  10 mL, Intravenous, Every visit          Answers submitted by the patient for this visit:  Review of Systems Questionnaire (Submitted on 2/21/2023)  appetite change : No  unexpected weight change: No  mouth sores: Yes  visual disturbance: No  adenopathy: No

## 2023-02-28 ENCOUNTER — INFUSION (OUTPATIENT)
Dept: INFUSION THERAPY | Facility: HOSPITAL | Age: 53
End: 2023-02-28
Attending: INTERNAL MEDICINE
Payer: MEDICAID

## 2023-02-28 ENCOUNTER — DOCUMENTATION ONLY (OUTPATIENT)
Dept: INFUSION THERAPY | Facility: HOSPITAL | Age: 53
End: 2023-02-28

## 2023-02-28 VITALS
HEART RATE: 98 BPM | SYSTOLIC BLOOD PRESSURE: 121 MMHG | WEIGHT: 293 LBS | HEIGHT: 66 IN | DIASTOLIC BLOOD PRESSURE: 77 MMHG | RESPIRATION RATE: 16 BRPM | BODY MASS INDEX: 47.09 KG/M2

## 2023-02-28 DIAGNOSIS — C18.7 MALIGNANT NEOPLASM OF SIGMOID COLON: ICD-10-CM

## 2023-02-28 DIAGNOSIS — C77.2 METASTASIS TO INTESTINAL LYMPH NODE: Primary | ICD-10-CM

## 2023-02-28 PROCEDURE — 96367 TX/PROPH/DG ADDL SEQ IV INF: CPT | Mod: PN

## 2023-02-28 PROCEDURE — 96415 CHEMO IV INFUSION ADDL HR: CPT | Mod: PN

## 2023-02-28 PROCEDURE — 25000003 PHARM REV CODE 250: Mod: PN | Performed by: INTERNAL MEDICINE

## 2023-02-28 PROCEDURE — 96375 TX/PRO/DX INJ NEW DRUG ADDON: CPT | Mod: PN

## 2023-02-28 PROCEDURE — 96417 CHEMO IV INFUS EACH ADDL SEQ: CPT | Mod: PN

## 2023-02-28 PROCEDURE — 96368 THER/DIAG CONCURRENT INF: CPT | Mod: PN

## 2023-02-28 PROCEDURE — 96413 CHEMO IV INFUSION 1 HR: CPT | Mod: PN

## 2023-02-28 PROCEDURE — 96416 CHEMO PROLONG INFUSE W/PUMP: CPT | Mod: PN

## 2023-02-28 PROCEDURE — 63600175 PHARM REV CODE 636 W HCPCS: Mod: PN | Performed by: INTERNAL MEDICINE

## 2023-02-28 RX ORDER — SODIUM CHLORIDE 0.9 % (FLUSH) 0.9 %
10 SYRINGE (ML) INJECTION
Status: DISCONTINUED | OUTPATIENT
Start: 2023-02-28 | End: 2023-02-28 | Stop reason: HOSPADM

## 2023-02-28 RX ORDER — ATROPINE SULFATE 0.4 MG/ML
0.4 INJECTION, SOLUTION ENDOTRACHEAL; INTRAMEDULLARY; INTRAMUSCULAR; INTRAVENOUS; SUBCUTANEOUS ONCE AS NEEDED
Status: COMPLETED | OUTPATIENT
Start: 2023-02-28 | End: 2023-02-28

## 2023-02-28 RX ORDER — HEPARIN 100 UNIT/ML
500 SYRINGE INTRAVENOUS
Status: DISCONTINUED | OUTPATIENT
Start: 2023-02-28 | End: 2023-02-28 | Stop reason: HOSPADM

## 2023-02-28 RX ORDER — LORAZEPAM 2 MG/ML
1 INJECTION INTRAMUSCULAR
Status: COMPLETED | OUTPATIENT
Start: 2023-02-28 | End: 2023-02-28

## 2023-02-28 RX ADMIN — LEUCOVORIN CALCIUM 1010 MG: 350 INJECTION, POWDER, LYOPHILIZED, FOR SOLUTION INTRAMUSCULAR; INTRAVENOUS at 10:02

## 2023-02-28 RX ADMIN — FLUOROURACIL 6000 MG: 50 INJECTION, SOLUTION INTRAVENOUS at 12:02

## 2023-02-28 RX ADMIN — ATROPINE SULFATE 0.4 MG: 0.4 INJECTION, SOLUTION INTRAVENOUS at 10:02

## 2023-02-28 RX ADMIN — LORAZEPAM 1 MG: 2 INJECTION INTRAMUSCULAR; INTRAVENOUS at 09:02

## 2023-02-28 RX ADMIN — SODIUM CHLORIDE: 9 INJECTION, SOLUTION INTRAVENOUS at 09:02

## 2023-02-28 RX ADMIN — BEVACIZUMAB 680 MG: 400 INJECTION, SOLUTION INTRAVENOUS at 09:02

## 2023-02-28 RX ADMIN — SODIUM CHLORIDE 440 MG: 9 INJECTION, SOLUTION INTRAVENOUS at 10:02

## 2023-02-28 RX ADMIN — PALONOSETRON: 0.05 INJECTION, SOLUTION INTRAVENOUS at 09:02

## 2023-02-28 NOTE — PROGRESS NOTES
ONCOLOGY NUTRITION   FOLLOW UP VISIT        Gail Mckinnon is a 52 y.o. female.  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     REFERRAL FROM:   [] Integrative Oncology   [] Med/Heme Oncology  [] Radiation Oncology  [] Surgical Oncology   [] Infusion Nurse    [x] Routine Nutrition follow up    TREATMENT PLAN:   [] Full treatment plan pending  [x] Chemotherapy  [] Immunotherapy  [] Radiation  [] Concurrent  [] Surgery  [] Treatment complete/post-treatment    ANTHROPOMETRICS:  Wt Readings from Last 10 Encounters:   02/28/23 133 kg (293 lb 3.4 oz)   02/27/23 133 kg (293 lb 3.4 oz)   02/16/23 (!) 137.2 kg (302 lb 7.5 oz)   02/14/23 (!) 137.2 kg (302 lb 7.5 oz)   02/13/23 (P) 135.1 kg (297 lb 13.5 oz)   02/03/23 (!) 138.2 kg (304 lb 10.8 oz)   02/01/23 (!) 138.2 kg (304 lb 10.8 oz)   01/30/23 (!) 136.2 kg (300 lb 4.3 oz)   01/20/23 (!) 137.5 kg (303 lb 2.1 oz)   01/18/23 (!) 137.5 kg (303 lb 2.1 oz)      Weight Changes: has decreased 11 pounds over last month    PHYSICAL EXAM:  Muscle Wasting Observed:  [x] No Deficit   [] Mild Deficit   [] Moderate   [] Severe    INTAKE:  [x] PO Intake [] TF Intake  Current Diet: regular diet  Dietary Patterns:  Eating meals/snacks as tolerated  [] Oral nutritional supplements:     SYMPTOMS/COMPLAINTS:   [x] No nutritional concerns at current  [] Diarrhea                    [] Constipation           [] Nausea                 [] Vomiting                [] Indigestion                [] Reflux              [] Poor Appetite            [] Anorexia                 [] Early Satiety         [] Gas                       [] Bloating                     [] Dry Mouth    [] Mucositis                   [] Mouth Sores           [] Poor Dentition      [] Difficulty chewing  [] Difficulty Swallowing   [] Pain with swallowing [] Change in taste      [] Change in smell   [] Pain (general)       [] Fatigue                      [] Sleep issues    []  Weight loss  [] other, please specify-     Nutrition Re-Assessment Risk: Low    [x] Labs reviewed   [x] Meds reviewed    Education Provided:   [x] No Education Needed at this time  [] Diarrhea                                              [] Constipation                          [] Nausea/Vomiting  [] Mucositis                [] Dry Mouth    [] Dealing with changes in Taste/Smell  [] Dealing with Poor Appetite   [] Soft/moist Diet      [] Weight Loss/Gain     [] Weight Maintenance                           [] Indigestion/GERD                 [] Gas/Bloating          [] Foods High/ Low in specific nutrients [] Increasing Calories/Protein   [] Milkshake/Smoothies Recipes   [] Nutrition Supplements                        [] Increasing Fluid Intakes         [] Foods that fight cancer    [] Evidence bases resources                 [] Fermented Foods/Probiotics  [] Mediterranean/Plant Based Diet     [] Other, specify                                   [] Handouts provided      [] Samples provided     RD NOTE:  RD met with pt at chairside during infusion tx. Pt continues to do well nutritionally. Pt denies any nutrition related side effects. Per pt chart, pt was experiencing nausea which is improving. Pt denies any difficulty chewing or any food intolerances. Pt weight is fluctuating but overall stable.     Pt eligible for TFP order. Pt asking for produce only today. Order filled by this RD.      RD Goals:   [x] Weight stable                  [] Weight gain                      [] Weight Loss                               [] Continue adequate Kcal/protein   [] Increase Kcal/protein      [] Adjust Tube-feeding Rx   [] Tolerate Tube Feedings             [] Increase tube feedings to goal     [] Tolerate Supplements     [] Symptom Improvement   [] Understand nutrition Education  [x] Offer supportive visits   [] other, please specify    RECOMMENDATIONS:  Continue current calorie/protein intake to maintain body weight   Continue  current fluid intake to maintain proper hydration     Follow up: Next infusion or PRN throughout tx    Ciera Richardson RDN, LDN  02/28/2023  2:44 PM

## 2023-02-28 NOTE — PLAN OF CARE
Pt tolerated folfiri well today, Blood return noted prior to pump connection, all connections secured with coban, pt has spill kit and tip sheet at home from previous infusions. Verified pump infusing prior to d/c. Reviewed follow-up appointments. All questions were answered, ambulated independently at d/c.

## 2023-03-01 ENCOUNTER — DOCUMENTATION ONLY (OUTPATIENT)
Dept: INFUSION THERAPY | Facility: HOSPITAL | Age: 53
End: 2023-03-01

## 2023-03-01 ENCOUNTER — SPECIALTY PHARMACY (OUTPATIENT)
Dept: PHARMACY | Facility: CLINIC | Age: 53
End: 2023-03-01
Payer: MEDICAID

## 2023-03-01 ENCOUNTER — PATIENT MESSAGE (OUTPATIENT)
Dept: PHARMACY | Facility: CLINIC | Age: 53
End: 2023-03-01
Payer: MEDICAID

## 2023-03-01 NOTE — TELEPHONE ENCOUNTER
Patient agreed to call back for initial consult once she has her new address. Her anticipated start date is 3/13.

## 2023-03-01 NOTE — TELEPHONE ENCOUNTER
Received rx for capecitabine for colorectal cancer, dose appropriate. Patient is being switched from 5FU to oral capecitabine due to 5FU shortage.    PA not required. Estimated copay is $0. Will forward for initial consultation. Patient starts next cycle on 3/13/2023

## 2023-03-01 NOTE — PROGRESS NOTES
SW communicated with pt regarding need for help with rent deposits. Pt has an appointment that will be ready in 2 weeks zeus needs help with deposit and possible rent. JULIANNE contacted 211 regarding available resources. JULIANNE contacted the following resources:  [employer] ([facility]) [phone] [email or web address]--pt can go on line fill out application. Will not help with deposit but may help with rent.   2.  [facility] [phone]      no funds available  3. [facility] [phone] helping with rent on 3/9/23. Pt will need to call to set up an appointment.     Pt will follow up with [facility] and [facility].    JENNIE SamuelsW

## 2023-03-01 NOTE — TELEPHONE ENCOUNTER
Specialty Pharmacy - Initial Clinical Assessment    Specialty Medication Orders Linked to Encounter      Flowsheet Row Most Recent Value   Medication #1 capecitabine (XELODA) 500 MG Tab (Order#[id number], Rx#[id number])          Patient Diagnosis   C18.7 - Malignant neoplasm of sigmoid colon    Subjective    Gail Mckinnon is a 52 y.o. female, who is followed by the specialty pharmacy service for management and education.    Recent Encounters       Date Type Provider Description    03/01/2023 Specialty Pharmacy Kylie Celis PharmD Initial Clinical Assessment    03/01/2023 Specialty Pharmacy Kylie Celis PharmD Referral Authorization          Clinical call attempts since last clinical assessment   3/1/2023  9:28 PM - Specialty Pharmacy - Clinical Assessment by Kylie Celis PharmD     Current Outpatient Medications   Medication Sig    acetaminophen (TYLENOL) 500 MG tablet Take 2 tablets (1,000 mg total) by mouth every 8 (eight) hours.    benzonatate (TESSALON PERLES) 100 MG capsule Take 1 capsule (100 mg total) by mouth every 6 (six) hours as needed for Cough. (Patient not taking: Reported on 2/27/2023)    buPROPion (WELLBUTRIN SR) 150 MG TBSR 12 hr tablet Take 1 tablet (150 mg total) by mouth 2 (two) times daily.    busPIRone (BUSPAR) 10 MG tablet Take 1 tablet (10 mg total) by mouth 2 (two) times daily.    capecitabine (XELODA) 500 MG Tab Take 5 tablets (2,500 mg total) by mouth 2 (two) times daily on 7 days on and 7 days off for one month. Take first dose 3/13.    granisetron (SANCUSO) 3.1 mg/24 hour Place 1 patch (3.1 mg total) onto the skin every 7 days AS DIRECTED.    Lactobacillus rhamnosus GG (CULTURELLE) 10 billion cell capsule Take 1 capsule by mouth once daily.    lactulose (CHRONULAC) 10 gram/15 mL solution Take 30 mLs (20 g total) by mouth daily as needed (constipation).    levothyroxine (SYNTHROID) 200 MCG tablet Take 1 tablet (200 mcg total) by mouth once daily.     LIDOcaine-prilocaine (EMLA) cream Apply topically as needed (30 to 60 min prior chemo or port use).    LORazepam (ATIVAN) 1 MG tablet Take 1 tablet (1 mg total) by mouth every 12 (twelve) hours as needed for Anxiety (nausea).    magic mouthwash diphen/antac/lidoc/nysta Swish and swallow 5 mL every 4 hours as needed for mouth pain/ulcers    multivitamin (THERAGRAN) per tablet Take 1 tablet by mouth once daily.    olmesartan (BENICAR) 20 MG tablet Take 1 tablet (20 mg total) by mouth once daily.    ondansetron (ZOFRAN-ODT) 8 MG TbDL Take 1 tablet (8 mg total) by mouth every 8 (eight) hours as needed.    prochlorperazine (COMPAZINE) 10 MG tablet Take 1 tablet (10 mg total) by mouth every 6 (six) hours as needed.    promethazine (PHENERGAN) 12.5 MG Tab Take 1 to 2 tablets by mouth every 6 hours as needed for nausea persistent despite zofran    senna-docusate 8.6-50 mg (PERICOLACE) 8.6-50 mg per tablet Take 2 tablets by mouth 2 (two) times daily.    traMADoL (ULTRAM) 50 mg tablet Take 1 tablet (50 mg total) by mouth 3 (three) times daily as needed for Pain.    traZODone (DESYREL) 50 MG tablet Take 1 tablet (50 mg total) by mouth nightly.   Last reviewed on 3/1/2023  4:31 PM by Kylie Celis, Carolyn    Review of patient's allergies indicates:   Allergen Reactions    Oxaliplatin Other (See Comments)     Itchy hands, throat closed up, trouble hearing - during infusion    Penicillins Hives and Rash     childhood allergy  childhood allergy  unknown   Last reviewed on  3/1/2023 4:31 PM by Kylie Celis    Drug Interactions    Drug interactions evaluated: yes  Clinically relevant drug interactions identified: yes   Interactions list: Cat C: capecitabine and ondansetron - Ondansetron may enhance the QTc-prolonging effect of Fluorouracil Products     Drug management plan: Cat C: capecitabine and ondansetron - Monitor for QTc interval prolongation and ventricular arrhythmias (including torsades de pointes) when these drugs are  combined. Patients with other risk factors (eg, older age, female sex, bradycardia, hypokalemia, hypomagnesemia, heart disease, and higher drug concentrations) or those using IV ondansetron are likely at greater risk for these potentially life-threatening toxicities.            Adverse Effects    Activity change: Pos  Fatigue: Pos  Nausea: Pos  Skin: System reviewed and is negative  Eyes: System reviewed and is negative  Endocrine and Metabolic: System reviewed and is negative  Genitourinary: System reviewed and is negative  Cardiovascular: System reviewed and is negative  Hematologic: System reviewed and is negative  Musculoskeletal: System reviewed and is negative  HENT: System reviewed and is negative  Neurological: System reviewed and is negative  Psychiatric: System reviewed and is negative  Respiratory: System reviewed and is negative       Assessment Questions - Documented Responses      Flowsheet Row Most Recent Value   Assessment    Medication Reconciliation completed for patient Yes   During the past 4 weeks, has patient missed any activities due to condition or medication? No   During the past 4 weeks, did patient have any of the following urgent care visits? None   Goals of Therapy Status Discussed (new start)   Status of the patients ability to self-administer: Is Able   All education points have been covered with patient? Yes, supplemental printed education provided   Welcome packet contents reviewed and discussed with patient? Yes   Assesment completed? Yes   Plan Therapy being initiated   Do you need to open a clinical intervention (i-vent)? No   Do you want to schedule first shipment? Yes   Medication #1 Assessment Info    Patient status New medication, New to [facility]   Is this medication appropriate for the patient? Yes   Is this medication effective? Not yet started          Refill Questions - Documented Responses      Flowsheet Row Most Recent Value   Patient Availability and HIPAA Verification   "  Does patient want to proceed with activity? Yes   HIPAA/medical authority confirmed? Yes   Relationship to patient of person spoken to? Self   Refill Screening Questions    When does the patient need to receive the medication? 03/13/23   Refill Delivery Questions    How will the patient receive the medication? MEDRx   When does the patient need to receive the medication? 03/13/23   Shipping Address Home   Address in [facility] confirmed and updated if neccessary? Yes   Expected Copay ($) 0   Is the patient able to afford the medication copay? Yes   Payment Method zero copay   Days supply of Refill 28   Supplies needed? No supplies needed   Refill activity completed? Yes   Refill activity plan Refill scheduled   Shipment/Pickup Date: 03/08/23            Objective    She has a past medical history of Anxiety, Depression, FH: ovarian cancer (3/16/2020), psychiatric care, Hyperthyroidism, Hypothyroid, Kidney calculi, Malignant neoplasm of sigmoid colon (3/16/2020), Menorrhagia, Multinodular goiter (8/24/2012), Palpitation, Psychiatric problem, and Venous insufficiency.    Tried/failed medications: FOLFOX, FOLFIRI    BP Readings from Last 4 Encounters:   02/28/23 121/77   02/27/23 108/62   02/16/23 119/78   02/14/23 115/72     Ht Readings from Last 4 Encounters:   02/28/23 5' 6" (1.676 m)   02/27/23 5' 6" (1.676 m)   02/16/23 5' 6" (1.676 m)   02/14/23 5' 6" (1.676 m)     Wt Readings from Last 4 Encounters:   02/28/23 133 kg (293 lb 3.4 oz)   02/27/23 133 kg (293 lb 3.4 oz)   02/16/23 (!) 137.2 kg (302 lb 7.5 oz)   02/14/23 (!) 137.2 kg (302 lb 7.5 oz)     Recent Labs   Lab Result Units 02/27/23  1204 02/13/23  1410 01/30/23  1327 01/18/23  1000   RBC M/uL 4.89 4.88 5.21 5.00   Hemoglobin g/dL 14.4 14.4 14.9 14.4   Hematocrit % 44.2 44.7 46.6 45.0   WBC K/uL 4.71 3.96 5.63 4.64   Gran # (ANC) K/uL 2.3 2.0 3.0 2.6   Gran % % 49.2 51.0 52.6 56.6   Platelets K/uL 256 191 262 234   Sodium mmol/L 137 139 140 141 "   Potassium mmol/L 4.6 4.3 4.1 3.8   Chloride mmol/L 107 108 107 109   Glucose mg/dL 98 102 120 H 145 H   BUN mg/dL 17 18 13 11   Creatinine mg/dL 1.1 1.0 0.9 0.9   Calcium mg/dL 11.2 H 10.7 H 10.6 H 10.2   Total Protein g/dL 7.2 6.9 7.4 7.1   Albumin g/dL 3.9 3.7 3.9 3.6   Total Bilirubin mg/dL 0.5 0.4 0.4 0.4   Alkaline Phosphatase U/L 127 119 153 H 149 H   AST U/L 54 H 65 H 48 H 46 H   ALT U/L 78 H 87 H 83 H 75 H     The goals of cancer treatment include:  Achieving remission of cancer, if possible  Reducing tumor size and spread of cancer, if remission is not possible  Minimizing pain and symptoms of the cancer  Preventing infection and other complications of treatment  Promoting adequate nutrition  Encouraging proper hydration  Improving or maintaining quality of life  Maintaining optimal therapy adherence  Minimizing and managing side effects    Goals of Therapy Status: Discussed (new start)    Assessment/Plan  Patient plans to start therapy on 03/13/23      Indication, dosage, appropriateness, effectiveness, safety and convenience of her specialty medication(s) were reviewed today.     Patient Education   Patient received education on the following:   Expectations and possible outcomes of therapy  Proper use, timely administration, and missed dose management  Duration of therapy  Side effects, including prevention, minimization, and management  Contraindications and safety precautions  New or changed medications, including prescribe and over the counter medications and supplements  Reviews recommended vaccinations, as appropriate  Storage, safe handling, and disposal    Patient is being switched from 5FU to oral capecitabine due to 5FU shortage. Patient's next cycle is 3/13/2023    Tasks added this encounter   4/3/2023 - Refill Call (Auto Added)  8/21/2023 - Clinical - Follow Up Assesement (180 day)   Tasks due within next 3 months   No tasks due.     Kylie Celis, PharmD  [facility]  [address] [facility] [address]  Phone: [phone]  Fax: [phone]

## 2023-03-02 ENCOUNTER — DOCUMENTATION ONLY (OUTPATIENT)
Dept: INFUSION THERAPY | Facility: HOSPITAL | Age: 53
End: 2023-03-02

## 2023-03-02 ENCOUNTER — INFUSION (OUTPATIENT)
Dept: INFUSION THERAPY | Facility: HOSPITAL | Age: 53
End: 2023-03-02
Attending: INTERNAL MEDICINE
Payer: MEDICAID

## 2023-03-02 VITALS
HEART RATE: 80 BPM | RESPIRATION RATE: 16 BRPM | DIASTOLIC BLOOD PRESSURE: 73 MMHG | SYSTOLIC BLOOD PRESSURE: 110 MMHG | TEMPERATURE: 98 F

## 2023-03-02 DIAGNOSIS — C18.7 MALIGNANT NEOPLASM OF SIGMOID COLON: ICD-10-CM

## 2023-03-02 DIAGNOSIS — C77.2 METASTASIS TO INTESTINAL LYMPH NODE: Primary | ICD-10-CM

## 2023-03-02 PROCEDURE — 25000003 PHARM REV CODE 250: Mod: PN | Performed by: INTERNAL MEDICINE

## 2023-03-02 PROCEDURE — 63600175 PHARM REV CODE 636 W HCPCS: Mod: PN | Performed by: INTERNAL MEDICINE

## 2023-03-02 PROCEDURE — 96360 HYDRATION IV INFUSION INIT: CPT | Mod: PN

## 2023-03-02 PROCEDURE — 96361 HYDRATE IV INFUSION ADD-ON: CPT | Mod: PN

## 2023-03-02 RX ORDER — HEPARIN 100 UNIT/ML
500 SYRINGE INTRAVENOUS
Status: DISCONTINUED | OUTPATIENT
Start: 2023-03-02 | End: 2023-03-02 | Stop reason: HOSPADM

## 2023-03-02 RX ORDER — SODIUM CHLORIDE 0.9 % (FLUSH) 0.9 %
10 SYRINGE (ML) INJECTION
Status: DISCONTINUED | OUTPATIENT
Start: 2023-03-02 | End: 2023-03-02 | Stop reason: HOSPADM

## 2023-03-02 RX ORDER — SODIUM CHLORIDE 9 MG/ML
1000 INJECTION, SOLUTION INTRAVENOUS
Status: DISCONTINUED | OUTPATIENT
Start: 2023-03-02 | End: 2023-03-02 | Stop reason: HOSPADM

## 2023-03-02 RX ADMIN — Medication 500 UNITS: at 12:03

## 2023-03-02 RX ADMIN — SODIUM CHLORIDE 1000 ML: 9 INJECTION, SOLUTION INTRAVENOUS at 09:03

## 2023-03-02 NOTE — PROGRESS NOTES
Oncology   Chemotherapy Infusion Visit    Quick Social Service Status Follow Up   Met w/ pt briefly to follow up on social and emotional needs since initiation of treatment.      SW met with the pt at chairside. Pt reports she has been moving out of her apartment for the last two days. She has placed her belongings in storage. Until her new apartment is finished being remolded she will be staying with friends. She is hopeful to be in the apartment in 2 weeks.   Pt has not had a chance to follow up on accessing rental resources. SW assisted pt in contacting the [facility] [phone] to request an appointment to apply for rental assistance. Pt spoke with them and was able to schedule an appointment to apply for help on 3/21/23 at 10:30AM.   No other needs noted at this time.        Radha Giordano, JENNIEW  03/02/2023  12:51 PM

## 2023-03-02 NOTE — PLAN OF CARE
Pt pump d/c per protocol. IVF given per orders. Reviewed f/u appts. All questions answered, pt adequate for d/c.

## 2023-03-13 ENCOUNTER — LAB VISIT (OUTPATIENT)
Dept: LAB | Facility: HOSPITAL | Age: 53
End: 2023-03-13
Attending: INTERNAL MEDICINE
Payer: MEDICAID

## 2023-03-13 ENCOUNTER — TELEPHONE (OUTPATIENT)
Dept: HEMATOLOGY/ONCOLOGY | Facility: CLINIC | Age: 53
End: 2023-03-13
Payer: MEDICAID

## 2023-03-13 ENCOUNTER — OFFICE VISIT (OUTPATIENT)
Dept: HEMATOLOGY/ONCOLOGY | Facility: CLINIC | Age: 53
End: 2023-03-13
Payer: MEDICAID

## 2023-03-13 ENCOUNTER — TELEPHONE (OUTPATIENT)
Dept: PSYCHIATRY | Facility: CLINIC | Age: 53
End: 2023-03-13
Payer: MEDICAID

## 2023-03-13 VITALS
SYSTOLIC BLOOD PRESSURE: 110 MMHG | OXYGEN SATURATION: 96 % | HEIGHT: 66 IN | TEMPERATURE: 97 F | HEART RATE: 87 BPM | DIASTOLIC BLOOD PRESSURE: 64 MMHG | RESPIRATION RATE: 16 BRPM | BODY MASS INDEX: 47.09 KG/M2 | WEIGHT: 293 LBS

## 2023-03-13 DIAGNOSIS — C18.7 MALIGNANT NEOPLASM OF SIGMOID COLON: Primary | ICD-10-CM

## 2023-03-13 DIAGNOSIS — T45.1X5A CHEMOTHERAPY-INDUCED NEUTROPENIA: ICD-10-CM

## 2023-03-13 DIAGNOSIS — C18.7 MALIGNANT NEOPLASM OF SIGMOID COLON: ICD-10-CM

## 2023-03-13 DIAGNOSIS — D70.1 CHEMOTHERAPY-INDUCED NEUTROPENIA: ICD-10-CM

## 2023-03-13 DIAGNOSIS — C77.2 METASTASIS TO INTESTINAL LYMPH NODE: ICD-10-CM

## 2023-03-13 DIAGNOSIS — F41.9 ANXIETY: ICD-10-CM

## 2023-03-13 LAB
ALBUMIN SERPL BCP-MCNC: 3.8 G/DL (ref 3.5–5.2)
ALP SERPL-CCNC: 141 U/L (ref 55–135)
ALT SERPL W/O P-5'-P-CCNC: 85 U/L (ref 10–44)
ANION GAP SERPL CALC-SCNC: 10 MMOL/L (ref 8–16)
AST SERPL-CCNC: 48 U/L (ref 10–40)
BACTERIA #/AREA URNS HPF: NORMAL /HPF
BASOPHILS # BLD AUTO: 0.04 K/UL (ref 0–0.2)
BASOPHILS NFR BLD: 0.8 % (ref 0–1.9)
BILIRUB SERPL-MCNC: 0.5 MG/DL (ref 0.1–1)
BILIRUB UR QL STRIP: NEGATIVE
BUN SERPL-MCNC: 16 MG/DL (ref 6–20)
CALCIUM SERPL-MCNC: 10.7 MG/DL (ref 8.7–10.5)
CHLORIDE SERPL-SCNC: 109 MMOL/L (ref 95–110)
CLARITY UR: CLEAR
CO2 SERPL-SCNC: 21 MMOL/L (ref 23–29)
COLOR UR: YELLOW
CREAT SERPL-MCNC: 1.1 MG/DL (ref 0.5–1.4)
DIFFERENTIAL METHOD: ABNORMAL
EOSINOPHIL # BLD AUTO: 0.3 K/UL (ref 0–0.5)
EOSINOPHIL NFR BLD: 6 % (ref 0–8)
ERYTHROCYTE [DISTWIDTH] IN BLOOD BY AUTOMATED COUNT: 17.8 % (ref 11.5–14.5)
EST. GFR  (NO RACE VARIABLE): 59.7 ML/MIN/1.73 M^2
GLUCOSE SERPL-MCNC: 95 MG/DL (ref 70–110)
GLUCOSE UR QL STRIP: NEGATIVE
HCT VFR BLD AUTO: 46.1 % (ref 37–48.5)
HGB BLD-MCNC: 14.5 G/DL (ref 12–16)
HGB UR QL STRIP: ABNORMAL
IMM GRANULOCYTES # BLD AUTO: 0.02 K/UL (ref 0–0.04)
IMM GRANULOCYTES NFR BLD AUTO: 0.4 % (ref 0–0.5)
KETONES UR QL STRIP: NEGATIVE
LEUKOCYTE ESTERASE UR QL STRIP: NEGATIVE
LYMPHOCYTES # BLD AUTO: 1.5 K/UL (ref 1–4.8)
LYMPHOCYTES NFR BLD: 30.6 % (ref 18–48)
MCH RBC QN AUTO: 29.9 PG (ref 27–31)
MCHC RBC AUTO-ENTMCNC: 31.5 G/DL (ref 32–36)
MCV RBC AUTO: 95 FL (ref 82–98)
MICROSCOPIC COMMENT: NORMAL
MONOCYTES # BLD AUTO: 0.7 K/UL (ref 0.3–1)
MONOCYTES NFR BLD: 13.4 % (ref 4–15)
NEUTROPHILS # BLD AUTO: 2.4 K/UL (ref 1.8–7.7)
NEUTROPHILS NFR BLD: 48.8 % (ref 38–73)
NITRITE UR QL STRIP: NEGATIVE
NRBC BLD-RTO: 0 /100 WBC
PH UR STRIP: 6 [PH] (ref 5–8)
PLATELET # BLD AUTO: 198 K/UL (ref 150–450)
PMV BLD AUTO: 10.1 FL (ref 9.2–12.9)
POTASSIUM SERPL-SCNC: 4.2 MMOL/L (ref 3.5–5.1)
PROT SERPL-MCNC: 7.3 G/DL (ref 6–8.4)
PROT UR QL STRIP: NEGATIVE
RBC # BLD AUTO: 4.85 M/UL (ref 4–5.4)
RBC #/AREA URNS HPF: 2 /HPF (ref 0–4)
SODIUM SERPL-SCNC: 140 MMOL/L (ref 136–145)
SP GR UR STRIP: <=1.005 (ref 1–1.03)
SQUAMOUS #/AREA URNS HPF: 3 /HPF
URN SPEC COLLECT METH UR: ABNORMAL
WBC # BLD AUTO: 5 K/UL (ref 3.9–12.7)
WBC #/AREA URNS HPF: 0 /HPF (ref 0–5)

## 2023-03-13 PROCEDURE — 99215 PR OFFICE/OUTPT VISIT, EST, LEVL V, 40-54 MIN: ICD-10-PCS | Mod: S$PBB,,, | Performed by: INTERNAL MEDICINE

## 2023-03-13 PROCEDURE — 99215 OFFICE O/P EST HI 40 MIN: CPT | Mod: S$PBB,,, | Performed by: INTERNAL MEDICINE

## 2023-03-13 PROCEDURE — 4010F ACE/ARB THERAPY RXD/TAKEN: CPT | Mod: CPTII,,, | Performed by: INTERNAL MEDICINE

## 2023-03-13 PROCEDURE — 3078F PR MOST RECENT DIASTOLIC BLOOD PRESSURE < 80 MM HG: ICD-10-PCS | Mod: CPTII,,, | Performed by: INTERNAL MEDICINE

## 2023-03-13 PROCEDURE — 4010F PR ACE/ARB THEARPY RXD/TAKEN: ICD-10-PCS | Mod: CPTII,,, | Performed by: INTERNAL MEDICINE

## 2023-03-13 PROCEDURE — 3074F SYST BP LT 130 MM HG: CPT | Mod: CPTII,,, | Performed by: INTERNAL MEDICINE

## 2023-03-13 PROCEDURE — 81000 URINALYSIS NONAUTO W/SCOPE: CPT | Mod: PN | Performed by: INTERNAL MEDICINE

## 2023-03-13 PROCEDURE — 3078F DIAST BP <80 MM HG: CPT | Mod: CPTII,,, | Performed by: INTERNAL MEDICINE

## 2023-03-13 PROCEDURE — 80053 COMPREHEN METABOLIC PANEL: CPT | Mod: PN | Performed by: INTERNAL MEDICINE

## 2023-03-13 PROCEDURE — 85025 COMPLETE CBC W/AUTO DIFF WBC: CPT | Mod: PN | Performed by: INTERNAL MEDICINE

## 2023-03-13 PROCEDURE — 3008F BODY MASS INDEX DOCD: CPT | Mod: CPTII,,, | Performed by: INTERNAL MEDICINE

## 2023-03-13 PROCEDURE — 36415 COLL VENOUS BLD VENIPUNCTURE: CPT | Mod: PN | Performed by: INTERNAL MEDICINE

## 2023-03-13 PROCEDURE — 3074F PR MOST RECENT SYSTOLIC BLOOD PRESSURE < 130 MM HG: ICD-10-PCS | Mod: CPTII,,, | Performed by: INTERNAL MEDICINE

## 2023-03-13 PROCEDURE — 3008F PR BODY MASS INDEX (BMI) DOCUMENTED: ICD-10-PCS | Mod: CPTII,,, | Performed by: INTERNAL MEDICINE

## 2023-03-13 PROCEDURE — 99214 OFFICE O/P EST MOD 30 MIN: CPT | Mod: PBBFAC,PN | Performed by: INTERNAL MEDICINE

## 2023-03-13 PROCEDURE — 99999 PR PBB SHADOW E&M-EST. PATIENT-LVL IV: CPT | Mod: PBBFAC,,, | Performed by: INTERNAL MEDICINE

## 2023-03-13 PROCEDURE — 99999 PR PBB SHADOW E&M-EST. PATIENT-LVL IV: ICD-10-PCS | Mod: PBBFAC,,, | Performed by: INTERNAL MEDICINE

## 2023-03-13 RX ORDER — LORAZEPAM 2 MG/ML
1 INJECTION INTRAMUSCULAR
Status: CANCELLED | OUTPATIENT
Start: 2023-03-14 | End: 2023-03-14

## 2023-03-13 RX ORDER — SODIUM CHLORIDE 9 MG/ML
1000 INJECTION, SOLUTION INTRAVENOUS
Status: CANCELLED | OUTPATIENT
Start: 2023-03-16

## 2023-03-13 RX ORDER — HEPARIN 100 UNIT/ML
500 SYRINGE INTRAVENOUS
Status: CANCELLED | OUTPATIENT
Start: 2023-03-16

## 2023-03-13 RX ORDER — SODIUM CHLORIDE 0.9 % (FLUSH) 0.9 %
10 SYRINGE (ML) INJECTION
Status: CANCELLED | OUTPATIENT
Start: 2023-03-16

## 2023-03-13 RX ORDER — ATROPINE SULFATE 0.4 MG/ML
0.4 INJECTION, SOLUTION ENDOTRACHEAL; INTRAMEDULLARY; INTRAMUSCULAR; INTRAVENOUS; SUBCUTANEOUS ONCE AS NEEDED
Status: CANCELLED | OUTPATIENT
Start: 2023-03-14

## 2023-03-13 RX ORDER — SODIUM CHLORIDE 0.9 % (FLUSH) 0.9 %
10 SYRINGE (ML) INJECTION
Status: CANCELLED | OUTPATIENT
Start: 2023-03-14

## 2023-03-13 RX ORDER — HEPARIN 100 UNIT/ML
500 SYRINGE INTRAVENOUS
Status: CANCELLED | OUTPATIENT
Start: 2023-03-14

## 2023-03-13 NOTE — TELEPHONE ENCOUNTER
Spoke with the patient and informed her we still have a requirement for covid vaccines to join classes.    ----- Message from Terrence Cisneros sent at 3/13/2023  3:06 PM CDT -----  Type: Need Medical Advice   Who Called: Gail  Maik callback number: [phone]  Additional Information: calling for information [employer] [email or web address] class  Please call to further assist, Thanks

## 2023-03-13 NOTE — PROGRESS NOTES
PROGRESS NOTE    Subjective:       Patient ID: Gail Mckinnon is a 52 y.o. female.  MRN: [id number]  : 1970    Chief Complaint: Malignant neoplasm of sigmoid colon (Follow up with labs, tx on 3/14/)      History of Present Illness:   Gail Mckinnon is a 52 y.o. female who presents with colon cancer, initially stage III and now with LN recurrence.       She completed adjuvant FOLFOX in 2020. She tolerated only 4 cycles of FOLFOX before she developed an infusion reaction to oxaliplatin in cycle 5 was well as cycle 6. She then completed 6 cycles of infusional 5 FU.     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 She was offered second line therapy with FOLFIRI and Avastin.      She presented to the ED on  with left flank pain. CT renal stone study showed Moderate hydronephrosis on the left secondary to 3 mm calculus at the UPJ.    She had a PET scan mid April that showed possible progression of her disease with      She has been to [facility] for another opinion. No change was recommended in systemic therapy but she was offered surgical removal of the aorta caval nodes.  Recent sans at [facility]  show stable disease.      Surgery done 22. Received 2 more cycle of FOLFIRI without Avastin.     She had repeat imaging at [facility] on 22 that unfortunately showed disease progression since her surgery with multiple RP nodes and one mediastinal node.       PET 22  Impression:     1. Progression of disease with interval increase of abdominal and pelvic lymphadenopathy.  2. New area of moderate FDG uptake at the right half of the T9 vertebral body (image 124).  Favor degenerative disc changes.  No underlying osteolytic or osteoblastic lesion.  Recommend continued attention on follow-up.  3. No other evidence of FDG avid disease.     22  Impression After scan comparison:     1. Metastatic portacaval and left  periaortic retroperitoneal lymph nodes which remain stable between the CT of 09/24/2022 and the subsequent PET-CT of 12/08/2022.  There is no evidence of progression of metastatic disease.  2. Nonobstructing bilateral renal calculi and cholelithiasis.    2/10/22:  CT chest abd pelvis  Impression:     Stable periportal, retroperitoneal and mesenteric lymphadenopathy compared to PET-CT 12/08/2022.     Stable right middle lobe 3 mm pulmonary nodule.  No new or enlarging pulmonary nodule.     Hepatic steatosis.  Hepatosplenomegaly.     Cholelithiasis.     Bilateral nonobstructing nephrolithiasis.     Small hiatal hernia with retained contrast in the distal esophagus.  Correlate for reflux.    She had virtual visit at [facility] on 2/17/23.     Interim history:  She has continued FOLFIRI and Avastin and is here  to obtain clearance for ongoing therapy.   URI symptoms have resolved.      Reports stable fatigue, intermittent nausea and anxiety. Weight and appetite is stable.     She is following up with Dr. Palm.         Oncology History:  Oncology History   Malignant neoplasm of sigmoid colon   3/16/2020 Initial Diagnosis    Malignant neoplasm of sigmoid colon     3/31/2020 Cancer Staged    Staging form: Colon and Rectum, AJCC 8th Edition  - Clinical stage from 3/31/2020: Stage IIIC (cT4b, cN2a, cM0)     5/6/2020 - 11/17/2020 Chemotherapy    Treatment Summary   Plan Name: OP FOLFOX 6 Q2W  Treatment Goal: Curative  Status: Inactive  Start Date: 5/6/2020  End Date: 11/6/2020  Provider: Dylan Leyva MD  Chemotherapy: fluorouraciL injection 945 mg, 400 mg/m2 = 945 mg, Intravenous, Clinic/[facility] 1 time, 14 of 14 cycles  Administration: 945 mg (5/6/2020), 945 mg (5/20/2020), 945 mg (6/3/2020), 945 mg (6/17/2020), 945 mg (7/29/2020), 945 mg (8/12/2020), 945 mg (8/26/2020), 945 mg (9/9/2020), 945 mg (9/23/2020), 945 mg (10/6/2020), 945 mg (10/21/2020), 945 mg (11/4/2020)  fluorouraciL 2,400 mg/m2 = 5,665 mg in sodium chloride 0.9%  240 mL chemo infusion, 2,400 mg/m2 = 5,665 mg, Intravenous, Over 46 hours, 14 of 14 cycles  Administration: 5,665 mg (5/6/2020), 5,665 mg (5/20/2020), 5,665 mg (6/3/2020), 5,665 mg (6/17/2020), 5,665 mg (7/29/2020), 5,665 mg (8/12/2020), 5,665 mg (8/26/2020), 5,665 mg (9/9/2020), 5,665 mg (9/23/2020), 5,665 mg (10/6/2020), 5,665 mg (10/21/2020), 5,665 mg (11/4/2020)  leucovorin calcium 900 mg in dextrose 5 % 250 mL infusion, 945 mg, Intravenous, Clinic/[facility] 1 time, 14 of 14 cycles  Administration: 900 mg (5/6/2020), 900 mg (5/20/2020), 900 mg (6/3/2020), 900 mg (6/17/2020), 945 mg (6/30/2020), 945 mg (7/20/2020), 945 mg (7/29/2020), 945 mg (8/12/2020), 945 mg (8/26/2020), 945 mg (9/9/2020), 945 mg (9/23/2020), 945 mg (10/6/2020), 945 mg (10/21/2020), 945 mg (11/4/2020)  oxaliplatin (ELOXATIN) 200 mg in dextrose 5 % 500 mL chemo infusion, 201 mg, Intravenous, Clinic/[facility] 1 time, 6 of 6 cycles  Dose modification: 65 mg/m2 (original dose 85 mg/m2, Cycle 6)  Administration: 200 mg (5/6/2020), 200 mg (5/20/2020), 200 mg (6/3/2020), 200 mg (6/17/2020), 201 mg (6/30/2020), 150 mg (7/20/2020)     6/16/2021 -  Chemotherapy    Treatment Summary   Plan Name: OP COLORECTAL FOLFIRI + BEVACIZUMAB Q2W  Treatment Goal: Control  Status: Active  Start Date: 6/16/2021  End Date: 5/11/2023 (Planned)  Provider: Marah Santo MD  Chemotherapy: fluorouraciL injection 990 mg, 400 mg/m2 = 990 mg, Intravenous, Clinic/[facility] 1 time, 15 of 15 cycles  Administration: 990 mg (6/16/2021), 990 mg (6/30/2021), 990 mg (7/14/2021), 980 mg (7/28/2021), 990 mg (8/11/2021), 990 mg (8/25/2021), 990 mg (9/8/2021), 990 mg (9/22/2021), 990 mg (10/6/2021), 990 mg (10/20/2021), 990 mg (11/3/2021), 990 mg (12/1/2021), 990 mg (12/15/2021), 990 mg (11/17/2021), 990 mg (12/28/2021)  fluorouraciL 2,400 mg/m2 = 5,950 mg in sodium chloride 0.9% 240 mL chemo infusion, 2,400 mg/m2 = 5,950 mg, Intravenous, Over 46 hours, 36 of 41 cycles  Administration: 5,950 mg  (6/16/2021), 5,950 mg (6/30/2021), 5,950 mg (7/14/2021), 5,880 mg (7/28/2021), 5,930 mg (8/11/2021), 5,930 mg (8/25/2021), 5,930 mg (9/8/2021), 5,930 mg (9/22/2021), 5,930 mg (10/6/2021), 5,930 mg (10/20/2021), 5,930 mg (11/3/2021), 5,930 mg (12/1/2021), 5,930 mg (12/15/2021), 5,930 mg (11/17/2021), 5,930 mg (12/28/2021), 6,050 mg (5/11/2022), 6,025 mg (3/9/2022), 6,025 mg (2/23/2022), 5,930 mg (2/2/2022), 5,930 mg (1/19/2022), 6,050 mg (4/20/2022), 6,025 mg (4/6/2022), 6,025 mg (3/23/2022), 6,050 mg (6/1/2022), 6,050 mg (6/15/2022), 6,050 mg (10/19/2022), 6,050 mg (10/5/2022), 6,050 mg (11/2/2022), 6,050 mg (11/16/2022), 6,050 mg (11/30/2022), 6,050 mg (1/4/2023), 6,050 mg (12/19/2022), 6,050 mg (1/18/2023), 6,000 mg (2/28/2023), 6,050 mg (2/14/2023), 6,000 mg (2/1/2023)  bevacizumab (AVASTIN) 600 mg in sodium chloride 0.9% 100 mL chemo infusion, 660 mg, Intravenous, Clinic/[facility] 1 time, 34 of 39 cycles  Dose modification: 5 mg/kg (original dose 5 mg/kg, Cycle 26, Reason: MD Discretion, Comment: 5 mg/kg original dose)  Administration: 600 mg (6/30/2021), 600 mg (7/14/2021), 600 mg (7/28/2021), 600 mg (6/16/2021), 600 mg (8/11/2021), 655 mg (8/25/2021), 600 mg (9/8/2021), 600 mg (9/22/2021), 600 mg (10/6/2021), 600 mg (10/20/2021), 600 mg (11/3/2021), 655 mg (12/1/2021), 600 mg (12/15/2021), 600 mg (11/17/2021), 600 mg (12/28/2021), 600 mg (5/11/2022), 600 mg (3/9/2022), 600 mg (2/23/2022), 600 mg (2/2/2022), 600 mg (1/19/2022), 600 mg (4/20/2022), 680 mg (4/6/2022), 600 mg (3/23/2022), 680 mg (10/5/2022), 680 mg (10/19/2022), 680 mg (11/2/2022), 680 mg (11/16/2022), 680 mg (11/30/2022), 680 mg (1/4/2023), 680 mg (12/19/2022), 680 mg (1/18/2023), 680 mg (2/28/2023), 680 mg (2/14/2023), 680 mg (2/1/2023)  irinotecan (CAMPTOSAR) 440 mg in sodium chloride 0.9% 500 mL chemo infusion, 446 mg, Intravenous, Clinic/[facility] 1 time, 36 of 41 cycles  Dose modification: 180 mg/m2 (original dose 180 mg/m2, Cycle 24)  Administration:  440 mg (6/16/2021), 440 mg (6/30/2021), 440 mg (7/14/2021), 440 mg (7/28/2021), 440 mg (8/11/2021), 444 mg (8/25/2021), 440 mg (9/8/2021), 440 mg (9/22/2021), 440 mg (10/6/2021), 440 mg (10/20/2021), 440 mg (11/3/2021), 440 mg (12/1/2021), 440 mg (12/15/2021), 440 mg (11/17/2021), 440 mg (12/28/2021), 440 mg (5/11/2022), 440 mg (3/9/2022), 440 mg (2/23/2022), 440 mg (2/2/2022), 440 mg (1/19/2022), 440 mg (4/20/2022), 440 mg (4/6/2022), 440 mg (3/24/2022), 440 mg (6/1/2022), 440 mg (6/15/2022), 440 mg (10/19/2022), 440 mg (10/5/2022), 440 mg (11/2/2022), 440 mg (11/16/2022), 440 mg (11/30/2022), 440 mg (1/4/2023), 440 mg (12/19/2022), 440 mg (1/18/2023), 440 mg (2/28/2023), 440 mg (2/14/2023), 440 mg (2/1/2023)     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   4/3/2020 Initial Diagnosis    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     5/6/2020 - 11/17/2020 Chemotherapy    Treatment Summary   Plan Name: OP FOLFOX 6 Q2W  Treatment Goal: Curative  Status: Inactive  Start Date: 5/6/2020  End Date: 11/6/2020  Provider: Dylan Leyva MD  Chemotherapy: fluorouraciL injection 945 mg, 400 mg/m2 = 945 mg, Intravenous, Clinic/[facility] 1 time, 14 of 14 cycles  Administration: 945 mg (5/6/2020), 945 mg (5/20/2020), 945 mg (6/3/2020), 945 mg (6/17/2020), 945 mg (7/29/2020), 945 mg (8/12/2020), 945 mg (8/26/2020), 945 mg (9/9/2020), 945 mg (9/23/2020), 945 mg (10/6/2020), 945 mg (10/21/2020), 945 mg (11/4/2020)  fluorouraciL 2,400 mg/m2 = 5,665 mg in sodium chloride 0.9% 240 mL chemo infusion, 2,400 mg/m2 = 5,665 mg, Intravenous, Over 46 hours, 14 of 14 cycles  Administration: 5,665 mg (5/6/2020), 5,665 mg (5/20/2020), 5,665 mg (6/3/2020), 5,665 mg (6/17/2020), 5,665 mg (7/29/2020), 5,665 mg (8/12/2020), 5,665 mg (8/26/2020), 5,665 mg (9/9/2020), 5,665 mg (9/23/2020), 5,665 mg (10/6/2020), 5,665 mg (10/21/2020), 5,665 mg (11/4/2020)  leucovorin calcium 900 mg in dextrose 5 % 250 mL infusion, 945 mg, Intravenous, Clinic/[facility] 1 time, 14 of 14  cycles  Administration: 900 mg (5/6/2020), 900 mg (5/20/2020), 900 mg (6/3/2020), 900 mg (6/17/2020), 945 mg (6/30/2020), 945 mg (7/20/2020), 945 mg (7/29/2020), 945 mg (8/12/2020), 945 mg (8/26/2020), 945 mg (9/9/2020), 945 mg (9/23/2020), 945 mg (10/6/2020), 945 mg (10/21/2020), 945 mg (11/4/2020)  oxaliplatin (ELOXATIN) 200 mg in dextrose 5 % 500 mL chemo infusion, 201 mg, Intravenous, Clinic/[facility] 1 time, 6 of 6 cycles  Dose modification: 65 mg/m2 (original dose 85 mg/m2, Cycle 6)  Administration: 200 mg (5/6/2020), 200 mg (5/20/2020), 200 mg (6/3/2020), 200 mg (6/17/2020), 201 mg (6/30/2020), 150 mg (7/20/2020)     6/16/2021 -  Chemotherapy    Treatment Summary   Plan Name: OP COLORECTAL FOLFIRI + BEVACIZUMAB Q2W  Treatment Goal: Control  Status: Active  Start Date: 6/16/2021  End Date: 5/11/2023 (Planned)  Provider: Marah Santo MD  Chemotherapy: fluorouraciL injection 990 mg, 400 mg/m2 = 990 mg, Intravenous, Clinic/[facility] 1 time, 15 of 15 cycles  Administration: 990 mg (6/16/2021), 990 mg (6/30/2021), 990 mg (7/14/2021), 980 mg (7/28/2021), 990 mg (8/11/2021), 990 mg (8/25/2021), 990 mg (9/8/2021), 990 mg (9/22/2021), 990 mg (10/6/2021), 990 mg (10/20/2021), 990 mg (11/3/2021), 990 mg (12/1/2021), 990 mg (12/15/2021), 990 mg (11/17/2021), 990 mg (12/28/2021)  fluorouraciL 2,400 mg/m2 = 5,950 mg in sodium chloride 0.9% 240 mL chemo infusion, 2,400 mg/m2 = 5,950 mg, Intravenous, Over 46 hours, 36 of 41 cycles  Administration: 5,950 mg (6/16/2021), 5,950 mg (6/30/2021), 5,950 mg (7/14/2021), 5,880 mg (7/28/2021), 5,930 mg (8/11/2021), 5,930 mg (8/25/2021), 5,930 mg (9/8/2021), 5,930 mg (9/22/2021), 5,930 mg (10/6/2021), 5,930 mg (10/20/2021), 5,930 mg (11/3/2021), 5,930 mg (12/1/2021), 5,930 mg (12/15/2021), 5,930 mg (11/17/2021), 5,930 mg (12/28/2021), 6,050 mg (5/11/2022), 6,025 mg (3/9/2022), 6,025 mg (2/23/2022), 5,930 mg (2/2/2022), 5,930 mg (1/19/2022), 6,050 mg (4/20/2022), 6,025 mg (4/6/2022), 6,025 mg  (3/23/2022), 6,050 mg (6/1/2022), 6,050 mg (6/15/2022), 6,050 mg (10/19/2022), 6,050 mg (10/5/2022), 6,050 mg (11/2/2022), 6,050 mg (11/16/2022), 6,050 mg (11/30/2022), 6,050 mg (1/4/2023), 6,050 mg (12/19/2022), 6,050 mg (1/18/2023), 6,000 mg (2/28/2023), 6,050 mg (2/14/2023), 6,000 mg (2/1/2023)  bevacizumab (AVASTIN) 600 mg in sodium chloride 0.9% 100 mL chemo infusion, 660 mg, Intravenous, [facility] [place] [facility] time, 34 of 39 cycles  Dose modification: 5 mg/kg (original dose 5 mg/kg, Cycle 26, Reason: MD Discretion, Comment: 5 mg/kg original dose)  Administration: 600 mg (6/30/2021), 600 mg (7/14/2021), 600 mg (7/28/2021), 600 mg (6/16/2021), 600 mg (8/11/2021), 655 mg (8/25/2021), 600 mg (9/8/2021), 600 mg (9/22/2021), 600 mg (10/6/2021), 600 mg (10/20/2021), 600 mg (11/3/2021), 655 mg (12/1/2021), 600 mg (12/15/2021), 600 mg (11/17/2021), 600 mg (12/28/2021), 600 mg (5/11/2022), 600 mg (3/9/2022), 600 mg (2/23/2022), 600 mg (2/2/2022), 600 mg (1/19/2022), 600 mg (4/20/2022), 680 mg (4/6/2022), 600 mg (3/23/2022), 680 mg (10/5/2022), 680 mg (10/19/2022), 680 mg (11/2/2022), 680 mg (11/16/2022), 680 mg (11/30/2022), 680 mg (1/4/2023), 680 mg (12/19/2022), 680 mg (1/18/2023), 680 mg (2/28/2023), 680 mg (2/14/2023), 680 mg (2/1/2023)  irinotecan (CAMPTOSAR) 440 mg in sodium chloride 0.9% 500 mL chemo infusion, 446 mg, Intravenous, [facility] [place] 1 time, 36 of 41 cycles  Dose modification: 180 mg/m2 (original dose 180 mg/m2, Cycle 24)  Administration: 440 mg (6/16/2021), 440 mg (6/30/2021), 440 mg (7/14/2021), 440 mg (7/28/2021), 440 mg (8/11/2021), 444 mg (8/25/2021), 440 mg (9/8/2021), 440 mg (9/22/2021), 440 mg (10/6/2021), 440 mg (10/20/2021), 440 mg (11/3/2021), 440 mg (12/1/2021), 440 mg (12/15/2021), 440 mg (11/17/2021), 440 mg (12/28/2021), 440 mg (5/11/2022), 440 mg (3/9/2022), 440 mg (2/23/2022), 440 mg (2/2/2022), 440 mg (1/19/2022), 440 mg (4/20/2022), 440 mg (4/6/2022), 440 mg (3/24/2022), 440 mg (6/1/2022), 440 mg  (6/15/2022), 440 mg (10/19/2022), 440 mg (10/5/2022), 440 mg (2022), 440 mg (2022), 440 mg (2022), 440 mg (2023), 440 mg (2022), 440 mg (2023), 440 mg (2023), 440 mg (2023), 440 mg (2023)         History:  Past Medical History:   Diagnosis Date    Anxiety     Depression     FH: ovarian cancer 3/16/2020    Hx of psychiatric care     Effexor, Paxil, Lexapro, Zoloft, Wellbutrin, Trazodone Buspar    Hyperthyroidism     Hypothyroid     Kidney calculi     Malignant neoplasm of sigmoid colon 3/16/2020    Menorrhagia     Multinodular goiter 2012    Palpitation     Psychiatric problem     Venous insufficiency       Past Surgical History:   Procedure Laterality Date     SECTION, CLASSIC      x3    COLONOSCOPY N/A 2020    Procedure: COLONOSCOPY;  Surgeon: Shane Parker MD;  Location: [facility];  Service: Endoscopy;  Laterality: N/A;    COLONOSCOPY N/A 2022    Procedure: COLONOSCOPY;  Surgeon: Shane Parker MD;  Location: [facility];  Service: Endoscopy;  Laterality: N/A;    CYSTOSCOPY W/ URETERAL STENT PLACEMENT Bilateral 3/25/2020    Procedure: CYSTOSCOPY, WITH URETERAL STENT INSERTION;  Surgeon: Claudio Tyson MD;  Location: [facility] OR [address] [place] [address];  Service: Urology;  Laterality: Bilateral;    INSERTION OF TUNNELED CENTRAL VENOUS CATHETER (CVC) WITH SUBCUTANEOUS PORT N/A 2020    Procedure: [id number];  Surgeon: [facility] Diagnostic Provider;  [facility];  Service: Radiology;  Laterality: N/A;  Room 189/Cindy    LAPAROSCOPIC SIGMOIDECTOMY N/A 3/25/2020    Procedure: COLECTOMY, SIGMOID, LAPAROSCOPIC, flex sig, ERAS high;  Surgeon: Silvio Man MD;  [facility];  Service: Colon and Rectal;  Laterality: N/A;    MOBILIZATION OF SPLENIC FLEXURE  3/25/2020    Procedure: MOBILIZATION, SPLENIC FLEXURE;  Surgeon: Silvio Man MD;  Location: [facility] OR [address] [place] [address];  Service: Colon and Rectal;;    tonsillectomy       TOTAL ABDOMINAL HYSTERECTOMY W/ BILATERAL SALPINGOOPHORECTOMY N/A 3/25/2020    Procedure: HYSTERECTOMY, TOTAL, ABDOMINAL, WITH BILATERAL SALPINGO-OOPHORECTOMY;  Surgeon: Keron Brady MD;  Location: [facility] OR [address] [place] [address];  Service: Oncology;  Laterality: N/A;     Family History   Problem Relation Age of Onset    Heart disease Father     Diabetes Mother 65    Drug abuse Brother     Drug abuse Brother     Cancer Maternal Aunt         lung cancer    Cancer Maternal Grandmother         stomach cancer- started in ovaries    Ovarian cancer Maternal Grandmother         stomach cancer- started in ovaries    Colon cancer Paternal Uncle     Cancer Paternal Uncle     Ovarian cancer Maternal Aunt     Ovarian cancer Maternal Aunt     Ovarian cancer Cousin         mother had ovarian cancer    Drug abuse Son     Drug abuse Son         clean/sober since 2012    Colon cancer Son     No Known Problems Daughter     Uterine cancer Neg Hx     Breast cancer Neg Hx      Social History     Tobacco Use    Smoking status: Never    Smokeless tobacco: Never   Substance and Sexual Activity    Alcohol use: Yes     Comment: occasionally- twice monthly    Drug use: Never    Sexual activity: Yes     Partners: Male     Birth control/protection: See Surgical Hx        ROS:   Review of Systems   Constitutional:  Positive for malaise/fatigue (first few days). Negative for fever and weight loss.   HENT:  Negative for congestion, hearing loss, nosebleeds and sore throat.    Eyes:  Negative for double vision and photophobia.   Respiratory:  Negative for hemoptysis, sputum production, shortness of breath and wheezing.    Cardiovascular:  Negative for chest pain, palpitations, orthopnea and leg swelling.   Gastrointestinal:  Positive for nausea (improving). Negative for abdominal pain, blood in stool, constipation, diarrhea, heartburn and vomiting.   Genitourinary:  Negative for dysuria, frequency, hematuria and urgency.   Musculoskeletal:  Negative for  "back pain, joint pain and myalgias.   Skin:  Negative for itching and rash.   Neurological:  Negative for dizziness, tingling, seizures, weakness and headaches.   Endo/Heme/Allergies:  Negative for polydipsia. Does not bruise/bleed easily.   Psychiatric/Behavioral:  Negative for depression and memory loss. The patient is nervous/anxious. The patient does not have insomnia.       Objective:     Vitals:    03/13/23 0908   BP: 110/64   Pulse: 87   Resp: 16   Temp: 96.7 °F (35.9 °C)   TempSrc: Temporal   SpO2: 96%   Weight: 134.5 kg (296 lb 8.3 oz)   Height: 5' 6" (1.676 m)   PainSc: 0-No pain         Wt Readings from Last 10 Encounters:   03/13/23 134.5 kg (296 lb 8.3 oz)   02/28/23 133 kg (293 lb 3.4 oz)   02/27/23 133 kg (293 lb 3.4 oz)   02/16/23 (!) 137.2 kg (302 lb 7.5 oz)   02/14/23 (!) 137.2 kg (302 lb 7.5 oz)   02/13/23 (P) 135.1 kg (297 lb 13.5 oz)   02/03/23 (!) 138.2 kg (304 lb 10.8 oz)   02/01/23 (!) 138.2 kg (304 lb 10.8 oz)   01/30/23 (!) 136.2 kg (300 lb 4.3 oz)   01/20/23 (!) 137.5 kg (303 lb 2.1 oz)       Physical Examination:   Physical Exam  Vitals and nursing note reviewed.   Constitutional:       General: She is not in acute distress.     Appearance: She is not diaphoretic.   HENT:      Head: Normocephalic.      Mouth/Throat:      Pharynx: No oropharyngeal exudate.   Eyes:      General: No scleral icterus.     Conjunctiva/sclera: Conjunctivae normal.   Neck:      Thyroid: No thyromegaly.   Cardiovascular:      Rate and Rhythm: Normal rate and regular rhythm.      Heart sounds: Normal heart sounds. No murmur heard.  Pulmonary:      Effort: Pulmonary effort is normal. No respiratory distress.      Breath sounds: No stridor. No wheezing or rales.   Chest:      Chest wall: No tenderness.   Abdominal:      General: Bowel sounds are normal. There is no distension.      Palpations: Abdomen is soft. There is no mass.      Tenderness: There is no abdominal tenderness. There is no rebound.   Musculoskeletal: "         General: No tenderness or deformity. Normal range of motion.      Cervical back: Neck supple.   Lymphadenopathy:      Cervical: No cervical adenopathy.   Skin:     General: Skin is warm and dry.      Findings: No erythema or rash.   Neurological:      Mental Status: She is alert and oriented to person, place, and time.      Cranial Nerves: No cranial nerve deficit.      Coordination: Coordination normal.      Gait: Gait is intact.   Psychiatric:         Mood and Affect: Affect normal.         Cognition and Memory: Memory normal.         Judgment: Judgment normal.        Diagnostic Tests:  Significant Imaging: I have reviewed and interpreted all pertinent imaging results/findings.  Laboratory Data:  All pertinent labs have been reviewed.  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 Oxaliplatin was stopped due to severe adverse reaction.     Follow-up CTs and PET in May 2021 showed enlarging retroperitoneal node, concerning for metastatic disease.      She started FOLFIRI + Avastin and has continued till July 2022.   Given isolated RP toni disease, she has undergone open Left periaortic and aortocaval lymph node dissection on 7/12/2022.     Follow up scans showed disease progression. I have discussed the case with Dr. Montelongo at [facility]. We discussed resuming  FOLFIRI-debo vs transitioning to BRAF directed therapy (BEACON).   There may be an option ton enroll in SWOG 2107 (encorafenib/cetuximab +/- nivo) at [facility] if no prior BRAF inhibitor exposure.[facility] is working with the patient's insurance to see if she can be enrolled there.      Patient is interested in the trial and therefore we resumed FOLFIRI- Debo. 2 follow up scans show stable disease and we will continue. [facility] follow up note reviewed. No other recommendations at this time.       If she has progressive disease and unable to got to [facility] and trial not available locally, will treat with next line treatment with encorafenib and cetuximab.     We discussed a nationwide shortage of 5 FU and that we will switch her to capecitabine while 5 FU becomes available. As per our hospital practice guidelines, I will offer her  capecitabine 1000 mg/m2 bid 7 days on and 7 days off to be started with the following cycle 3/13. She will receive avastin and Irinotecan tomorrow, monitory closely for toxicity.     Nausea   Improving on compazine.     Mucositis   Continue [facility] soln.     Transaminitis  Fluctuates.  Continue to monitor. No obvious liver mets.     Hypercalcemia   Mild, secondary to hyperparathyroidism.  Avoid calcium supplements.     Hypothyroidism  Multinodular goiter   Continue Levothyroxine. Endocrine consult is appreciated.   Had a non diagnostic biopsy in 2012, usg findings have remained stable. However, given uptake on PET this year, an FNA vs repeat usg in 6 months is recommended. Will follow closely.     Essential HTN   Continue antihypertensives.      Anxiety   Continue to  follow up with Dr. Palm.     MDM includes  :    - Acute or chronic illness or injury that poses a threat to life or bodily function  - Independent review and explanation of 3+ results from unique tests  - Discussion of management and ordering 3+ unique tests  - Extensive discussion of treatment and management  - Prescription drug management  -  Drug therapy requiring intensive monitoring for toxicity          ECOG SCORE               Discussion:   No follow-ups on file.    Plan was discussed with the patient at length, and she verbalized understanding. Gail was given an opportunity to ask questions that were answered to her satisfaction, and she was advised to call in the interval if any problems or questions arise.    Electronically signed by Marah Santo MD        Route Chart for Scheduling    Med Onc Chart Routing      Follow up with physician . Appts scheduled   Follow up with TAVO    Infusion scheduling note    Injection scheduling note    Labs    Imaging    Pharmacy appointment    Other referrals           Treatment Plan Information   OP COLORECTAL FOLFIRI + BEVACIZUMAB Q2W   Marah Santo MD   Upcoming Treatment Dates - OP COLORECTAL FOLFIRI + BEVACIZUMAB Q2W    3/14/2023       Pre-Medications       LORazepam injection 1 mg       promethazine (PHENERGAN) 6.25 mg in dextrose 5 % 100 mL IVPB       palonosetron 0.25mg/dexAMETHasone 20mg in NS IVPB       Chemotherapy       bevacizumab (AVASTIN) 5 mg/kg = 680 mg in sodium chloride 0.9% 100 mL chemo infusion       irinotecan (CAMPTOSAR) 440 mg in sodium chloride 0.9% 522 mL chemo infusion       leucovorin calcium 400 mg/m2 = 1,010 mg in dextrose 5 % 250 mL infusion       fluorouracil (ADRUCIL) 2,400 mg/m2 = 6,050 mg in sodium chloride 0.9% 240 mL chemo infusion       Supportive Care       atropine injection 0.4 mg  3/28/2023       Pre-Medications       LORazepam injection 1 mg       promethazine (PHENERGAN) 6.25 mg in dextrose 5 % 100 mL IVPB       palonosetron 0.25mg/dexAMETHasone 20mg in NS IVPB       Chemotherapy       bevacizumab (AVASTIN) 5 mg/kg = 680 mg in sodium chloride 0.9% 100 mL chemo infusion       irinotecan (CAMPTOSAR) 440 mg in sodium chloride 0.9% 522 mL chemo infusion       leucovorin calcium 400 mg/m2 = 1,010 mg in dextrose 5 % 250 mL infusion       fluorouracil (ADRUCIL) 2,400  mg/m2 = 6,050 mg in sodium chloride 0.9% 240 mL chemo infusion       Supportive Care       atropine injection 0.4 mg  4/11/2023       Pre-Medications       LORazepam injection 1 mg       promethazine (PHENERGAN) 6.25 mg in dextrose 5 % 100 mL IVPB       palonosetron 0.25mg/dexAMETHasone 20mg in NS IVPB       Chemotherapy       bevacizumab (AVASTIN) 5 mg/kg = 680 mg in sodium chloride 0.9% 100 mL chemo infusion       irinotecan (CAMPTOSAR) 440 mg in sodium chloride 0.9% 522 mL chemo infusion       leucovorin calcium 400 mg/m2 = 1,010 mg in dextrose 5 % 250 mL infusion       fluorouracil (ADRUCIL) 2,400 mg/m2 = 6,050 mg in sodium chloride 0.9% 240 mL chemo infusion       Supportive Care       atropine injection 0.4 mg  4/25/2023       Pre-Medications       LORazepam injection 1 mg       promethazine (PHENERGAN) 6.25 mg in dextrose 5 % 100 mL IVPB       palonosetron 0.25mg/dexAMETHasone 20mg in NS IVPB       Chemotherapy       bevacizumab (AVASTIN) 5 mg/kg = 680 mg in sodium chloride 0.9% 100 mL chemo infusion       irinotecan (CAMPTOSAR) 440 mg in sodium chloride 0.9% 522 mL chemo infusion       leucovorin calcium 400 mg/m2 = 1,010 mg in dextrose 5 % 250 mL infusion       fluorouracil (ADRUCIL) 2,400 mg/m2 = 6,050 mg in sodium chloride 0.9% 240 mL chemo infusion       Supportive Care       atropine injection 0.4 mg    Supportive Plan Information  IV FLUIDS AND ELECTROLYTES   Brayden Solo MD   Upcoming Treatment Dates - IV FLUIDS AND ELECTROLYTES    No upcoming days in selected categories.    Therapy Plan Information  PORT FLUSH  Flushes  heparin, porcine (PF) 100 unit/mL injection flush 500 Units  500 Units, Intravenous, Every visit  sodium chloride 0.9% flush 10 mL  10 mL, Intravenous, Every visit

## 2023-03-14 ENCOUNTER — OFFICE VISIT (OUTPATIENT)
Dept: PSYCHIATRY | Facility: CLINIC | Age: 53
End: 2023-03-14
Payer: MEDICAID

## 2023-03-14 ENCOUNTER — DOCUMENTATION ONLY (OUTPATIENT)
Dept: INFUSION THERAPY | Facility: HOSPITAL | Age: 53
End: 2023-03-14

## 2023-03-14 ENCOUNTER — TELEPHONE (OUTPATIENT)
Dept: PSYCHIATRY | Facility: CLINIC | Age: 53
End: 2023-03-14
Payer: MEDICAID

## 2023-03-14 ENCOUNTER — INFUSION (OUTPATIENT)
Dept: INFUSION THERAPY | Facility: HOSPITAL | Age: 53
End: 2023-03-14
Attending: INTERNAL MEDICINE
Payer: MEDICAID

## 2023-03-14 VITALS
BODY MASS INDEX: 47.09 KG/M2 | TEMPERATURE: 97 F | OXYGEN SATURATION: 97 % | HEIGHT: 66 IN | WEIGHT: 293 LBS | HEART RATE: 83 BPM | SYSTOLIC BLOOD PRESSURE: 132 MMHG | DIASTOLIC BLOOD PRESSURE: 71 MMHG | RESPIRATION RATE: 16 BRPM

## 2023-03-14 DIAGNOSIS — C18.7 MALIGNANT NEOPLASM OF SIGMOID COLON: ICD-10-CM

## 2023-03-14 DIAGNOSIS — F33.1 MODERATE EPISODE OF RECURRENT MAJOR DEPRESSIVE DISORDER: Primary | ICD-10-CM

## 2023-03-14 DIAGNOSIS — C80.1 ANXIETY ASSOCIATED WITH CANCER DIAGNOSIS: ICD-10-CM

## 2023-03-14 DIAGNOSIS — C77.2 METASTASIS TO INTESTINAL LYMPH NODE: Primary | ICD-10-CM

## 2023-03-14 DIAGNOSIS — F41.1 ANXIETY ASSOCIATED WITH CANCER DIAGNOSIS: ICD-10-CM

## 2023-03-14 PROCEDURE — 4010F ACE/ARB THERAPY RXD/TAKEN: CPT | Mod: AH,HB,CPTII, | Performed by: PSYCHOLOGIST

## 2023-03-14 PROCEDURE — 63600175 PHARM REV CODE 636 W HCPCS: Mod: PN | Performed by: INTERNAL MEDICINE

## 2023-03-14 PROCEDURE — A4216 STERILE WATER/SALINE, 10 ML: HCPCS | Mod: PN | Performed by: INTERNAL MEDICINE

## 2023-03-14 PROCEDURE — 25000003 PHARM REV CODE 250: Mod: PN | Performed by: INTERNAL MEDICINE

## 2023-03-14 PROCEDURE — 96375 TX/PRO/DX INJ NEW DRUG ADDON: CPT | Mod: PN

## 2023-03-14 PROCEDURE — 90834 PSYTX W PT 45 MINUTES: CPT | Mod: AH,HB,, | Performed by: PSYCHOLOGIST

## 2023-03-14 PROCEDURE — 4010F PR ACE/ARB THEARPY RXD/TAKEN: ICD-10-PCS | Mod: AH,HB,CPTII, | Performed by: PSYCHOLOGIST

## 2023-03-14 PROCEDURE — 96417 CHEMO IV INFUS EACH ADDL SEQ: CPT | Mod: PN

## 2023-03-14 PROCEDURE — 96367 TX/PROPH/DG ADDL SEQ IV INF: CPT | Mod: PN

## 2023-03-14 PROCEDURE — 90834 PR PSYCHOTHERAPY W/PATIENT, 45 MIN: ICD-10-PCS | Mod: AH,HB,, | Performed by: PSYCHOLOGIST

## 2023-03-14 PROCEDURE — 96413 CHEMO IV INFUSION 1 HR: CPT | Mod: PN

## 2023-03-14 RX ORDER — HEPARIN 100 UNIT/ML
500 SYRINGE INTRAVENOUS
Status: DISCONTINUED | OUTPATIENT
Start: 2023-03-14 | End: 2023-03-14 | Stop reason: HOSPADM

## 2023-03-14 RX ORDER — ATROPINE SULFATE 0.4 MG/ML
0.4 INJECTION, SOLUTION ENDOTRACHEAL; INTRAMEDULLARY; INTRAMUSCULAR; INTRAVENOUS; SUBCUTANEOUS ONCE AS NEEDED
Status: COMPLETED | OUTPATIENT
Start: 2023-03-14 | End: 2023-03-14

## 2023-03-14 RX ORDER — ACETAMINOPHEN 325 MG/1
650 TABLET ORAL ONCE AS NEEDED
Status: COMPLETED | OUTPATIENT
Start: 2023-03-14 | End: 2023-03-14

## 2023-03-14 RX ORDER — SODIUM CHLORIDE 0.9 % (FLUSH) 0.9 %
10 SYRINGE (ML) INJECTION
Status: DISCONTINUED | OUTPATIENT
Start: 2023-03-14 | End: 2023-03-14 | Stop reason: HOSPADM

## 2023-03-14 RX ORDER — LORAZEPAM 2 MG/ML
1 INJECTION INTRAMUSCULAR
Status: COMPLETED | OUTPATIENT
Start: 2023-03-14 | End: 2023-03-14

## 2023-03-14 RX ADMIN — ACETAMINOPHEN 650 MG: 325 TABLET, FILM COATED ORAL at 09:03

## 2023-03-14 RX ADMIN — ATROPINE SULFATE 0.4 MG: 0.4 INJECTION, SOLUTION INTRAVENOUS at 10:03

## 2023-03-14 RX ADMIN — SODIUM CHLORIDE: 9 INJECTION, SOLUTION INTRAVENOUS at 09:03

## 2023-03-14 RX ADMIN — PROMETHAZINE HYDROCHLORIDE 6.25 MG: 25 INJECTION INTRAMUSCULAR; INTRAVENOUS at 09:03

## 2023-03-14 RX ADMIN — LORAZEPAM 1 MG: 2 INJECTION INTRAMUSCULAR; INTRAVENOUS at 09:03

## 2023-03-14 RX ADMIN — Medication 10 ML: at 12:03

## 2023-03-14 RX ADMIN — SODIUM CHLORIDE 440 MG: 9 INJECTION, SOLUTION INTRAVENOUS at 11:03

## 2023-03-14 RX ADMIN — PALONOSETRON 0.25 MG: 0.05 INJECTION, SOLUTION INTRAVENOUS at 10:03

## 2023-03-14 RX ADMIN — BEVACIZUMAB 680 MG: 400 INJECTION, SOLUTION INTRAVENOUS at 09:03

## 2023-03-14 RX ADMIN — Medication 500 UNITS: at 12:03

## 2023-03-14 NOTE — PROGRESS NOTES
Nutrition Note  RD met with pt at chairside during infusion tx. Pt due for TFP order today. Pt reports she is not moved into her new home as of yet and would like to wait until she is settled to  her TFP order. RD agreed.    Ciera Richardson, DAVID, LDN

## 2023-03-14 NOTE — TELEPHONE ENCOUNTER
Called pt and lvm to schedule np appt with bhavesh at [facility]  2nd attempt   Will send mychart message

## 2023-03-14 NOTE — PLAN OF CARE
Problem: Adult Inpatient Plan of Care  Goal: Patient-Specific Goal (Individualized)  Outcome: Ongoing, Progressing  Flowsheets (Taken 3/14/2023 0959)  Anxieties, Fears or Concerns:   headache   started Xeloda yesterday  Individualized Care Needs: recliner, blanket, pillow, water     Problem: Fatigue  Goal: Improved Activity Tolerance  Intervention: Promote Improved Energy  Flowsheets (Taken 3/14/2023 0959)  Fatigue Management: frequent rest breaks encouraged  Sleep/Rest Enhancement:   natural light exposure provided   noise level reduced   room darkened  Activity Management:   Ambulated -L4   Ambulated in reyes - L4

## 2023-03-14 NOTE — PLAN OF CARE
Problem: Adult Inpatient Plan of Care  Goal: Plan of Care Review  Outcome: Ongoing, Progressing  Flowsheets (Taken 3/14/2023 1300)  Plan of Care Reviewed With: patient     Patient tolerated Avastin/Irinotecan treatment well. Port flushed with blood return, heparin locked, and de-accessed. AVS provided per portal and reviewed. Ambulates per self. No acute distress noted.

## 2023-03-14 NOTE — DISCHARGE INSTRUCTIONS
Things to report to your provider:  Diarrhea (4 or more watery stools in a 24 hour period)  Fever greater than 100.4 degrees  Sores in your mouth  Shortness of breath  Vomiting     If you need us before your next appointment, you can reach the [facility] at [phone]. Thank you for choosing us for your care!

## 2023-03-15 ENCOUNTER — PATIENT MESSAGE (OUTPATIENT)
Dept: PSYCHIATRY | Facility: CLINIC | Age: 53
End: 2023-03-15
Payer: MEDICAID

## 2023-03-15 ENCOUNTER — PATIENT MESSAGE (OUTPATIENT)
Dept: ADMINISTRATIVE | Facility: OTHER | Age: 53
End: 2023-03-15
Payer: MEDICAID

## 2023-03-20 DIAGNOSIS — R11.0 NAUSEA: ICD-10-CM

## 2023-03-20 DIAGNOSIS — C18.7 MALIGNANT NEOPLASM OF SIGMOID COLON: ICD-10-CM

## 2023-03-20 DIAGNOSIS — F32.A DEPRESSION, UNSPECIFIED DEPRESSION TYPE: ICD-10-CM

## 2023-03-21 RX ORDER — LORAZEPAM 1 MG/1
1 TABLET ORAL EVERY 12 HOURS PRN
Qty: 30 TABLET | Refills: 0 | Status: SHIPPED | OUTPATIENT
Start: 2023-03-21 | End: 2023-04-26 | Stop reason: SDUPTHER

## 2023-03-21 RX ORDER — BUPROPION HYDROCHLORIDE 150 MG/1
150 TABLET, EXTENDED RELEASE ORAL 2 TIMES DAILY
Qty: 180 TABLET | Refills: 0 | Status: SHIPPED | OUTPATIENT
Start: 2023-03-21 | End: 2023-11-05 | Stop reason: SDUPTHER

## 2023-03-24 ENCOUNTER — DOCUMENTATION ONLY (OUTPATIENT)
Dept: PHARMACY | Facility: HOSPITAL | Age: 53
End: 2023-03-24
Payer: MEDICAID

## 2023-03-27 ENCOUNTER — LAB VISIT (OUTPATIENT)
Dept: LAB | Facility: HOSPITAL | Age: 53
End: 2023-03-27
Attending: INTERNAL MEDICINE
Payer: MEDICAID

## 2023-03-27 ENCOUNTER — OFFICE VISIT (OUTPATIENT)
Dept: HEMATOLOGY/ONCOLOGY | Facility: CLINIC | Age: 53
End: 2023-03-27
Payer: MEDICAID

## 2023-03-27 VITALS
DIASTOLIC BLOOD PRESSURE: 60 MMHG | HEIGHT: 66 IN | HEART RATE: 76 BPM | SYSTOLIC BLOOD PRESSURE: 98 MMHG | WEIGHT: 277.75 LBS | BODY MASS INDEX: 44.64 KG/M2 | TEMPERATURE: 97 F | OXYGEN SATURATION: 96 %

## 2023-03-27 DIAGNOSIS — C18.7 MALIGNANT NEOPLASM OF SIGMOID COLON: Primary | ICD-10-CM

## 2023-03-27 DIAGNOSIS — C18.7 MALIGNANT NEOPLASM OF SIGMOID COLON: ICD-10-CM

## 2023-03-27 DIAGNOSIS — R11.0 CHEMOTHERAPY-INDUCED NAUSEA: ICD-10-CM

## 2023-03-27 DIAGNOSIS — R74.01 TRANSAMINITIS: ICD-10-CM

## 2023-03-27 DIAGNOSIS — T45.1X5A CHEMOTHERAPY-INDUCED NEUTROPENIA: ICD-10-CM

## 2023-03-27 DIAGNOSIS — C77.2 METASTASIS TO INTESTINAL LYMPH NODE: ICD-10-CM

## 2023-03-27 DIAGNOSIS — D70.1 CHEMOTHERAPY-INDUCED NEUTROPENIA: ICD-10-CM

## 2023-03-27 DIAGNOSIS — T45.1X5A CHEMOTHERAPY-INDUCED NAUSEA: ICD-10-CM

## 2023-03-27 DIAGNOSIS — C77.9 SECONDARY ADENOCARCINOMA OF LYMPH NODE: ICD-10-CM

## 2023-03-27 DIAGNOSIS — E83.52 HYPERCALCEMIA: ICD-10-CM

## 2023-03-27 LAB
ALBUMIN SERPL BCP-MCNC: 3.5 G/DL (ref 3.5–5.2)
ALP SERPL-CCNC: 133 U/L (ref 55–135)
ALT SERPL W/O P-5'-P-CCNC: 84 U/L (ref 10–44)
ANION GAP SERPL CALC-SCNC: 9 MMOL/L (ref 8–16)
AST SERPL-CCNC: 56 U/L (ref 10–40)
BASOPHILS # BLD AUTO: 0.04 K/UL (ref 0–0.2)
BASOPHILS NFR BLD: 0.9 % (ref 0–1.9)
BILIRUB SERPL-MCNC: 0.3 MG/DL (ref 0.1–1)
BILIRUB UR QL STRIP: NEGATIVE
BUN SERPL-MCNC: 13 MG/DL (ref 6–20)
CALCIUM SERPL-MCNC: 10.6 MG/DL (ref 8.7–10.5)
CHLORIDE SERPL-SCNC: 109 MMOL/L (ref 95–110)
CLARITY UR: CLEAR
CO2 SERPL-SCNC: 22 MMOL/L (ref 23–29)
COLOR UR: YELLOW
CREAT SERPL-MCNC: 0.9 MG/DL (ref 0.5–1.4)
DIFFERENTIAL METHOD: ABNORMAL
EOSINOPHIL # BLD AUTO: 0.3 K/UL (ref 0–0.5)
EOSINOPHIL NFR BLD: 6.2 % (ref 0–8)
ERYTHROCYTE [DISTWIDTH] IN BLOOD BY AUTOMATED COUNT: 18.8 % (ref 11.5–14.5)
EST. GFR  (NO RACE VARIABLE): >60 ML/MIN/1.73 M^2
GLUCOSE SERPL-MCNC: 93 MG/DL (ref 70–110)
GLUCOSE UR QL STRIP: NEGATIVE
HCT VFR BLD AUTO: 44.1 % (ref 37–48.5)
HGB BLD-MCNC: 14.1 G/DL (ref 12–16)
HGB UR QL STRIP: NEGATIVE
IMM GRANULOCYTES # BLD AUTO: 0.01 K/UL (ref 0–0.04)
IMM GRANULOCYTES NFR BLD AUTO: 0.2 % (ref 0–0.5)
KETONES UR QL STRIP: NEGATIVE
LEUKOCYTE ESTERASE UR QL STRIP: NEGATIVE
LYMPHOCYTES # BLD AUTO: 1.2 K/UL (ref 1–4.8)
LYMPHOCYTES NFR BLD: 28.3 % (ref 18–48)
MCH RBC QN AUTO: 30.5 PG (ref 27–31)
MCHC RBC AUTO-ENTMCNC: 32 G/DL (ref 32–36)
MCV RBC AUTO: 96 FL (ref 82–98)
MONOCYTES # BLD AUTO: 0.5 K/UL (ref 0.3–1)
MONOCYTES NFR BLD: 12 % (ref 4–15)
NEUTROPHILS # BLD AUTO: 2.3 K/UL (ref 1.8–7.7)
NEUTROPHILS NFR BLD: 52.4 % (ref 38–73)
NITRITE UR QL STRIP: NEGATIVE
NRBC BLD-RTO: 0 /100 WBC
PH UR STRIP: 6 [PH] (ref 5–8)
PLATELET # BLD AUTO: 223 K/UL (ref 150–450)
PMV BLD AUTO: 9.9 FL (ref 9.2–12.9)
POTASSIUM SERPL-SCNC: 4.2 MMOL/L (ref 3.5–5.1)
PROT SERPL-MCNC: 6.7 G/DL (ref 6–8.4)
PROT UR QL STRIP: NEGATIVE
RBC # BLD AUTO: 4.62 M/UL (ref 4–5.4)
SODIUM SERPL-SCNC: 140 MMOL/L (ref 136–145)
SP GR UR STRIP: 1.02 (ref 1–1.03)
URN SPEC COLLECT METH UR: NORMAL
WBC # BLD AUTO: 4.35 K/UL (ref 3.9–12.7)

## 2023-03-27 PROCEDURE — 99999 PR PBB SHADOW E&M-EST. PATIENT-LVL IV: CPT | Mod: PBBFAC,,, | Performed by: INTERNAL MEDICINE

## 2023-03-27 PROCEDURE — 4010F ACE/ARB THERAPY RXD/TAKEN: CPT | Mod: CPTII,,, | Performed by: INTERNAL MEDICINE

## 2023-03-27 PROCEDURE — 81003 URINALYSIS AUTO W/O SCOPE: CPT | Mod: PN | Performed by: INTERNAL MEDICINE

## 2023-03-27 PROCEDURE — 99999 PR PBB SHADOW E&M-EST. PATIENT-LVL IV: ICD-10-PCS | Mod: PBBFAC,,, | Performed by: INTERNAL MEDICINE

## 2023-03-27 PROCEDURE — 3074F SYST BP LT 130 MM HG: CPT | Mod: CPTII,,, | Performed by: INTERNAL MEDICINE

## 2023-03-27 PROCEDURE — 3008F PR BODY MASS INDEX (BMI) DOCUMENTED: ICD-10-PCS | Mod: CPTII,,, | Performed by: INTERNAL MEDICINE

## 2023-03-27 PROCEDURE — 99215 PR OFFICE/OUTPT VISIT, EST, LEVL V, 40-54 MIN: ICD-10-PCS | Mod: S$PBB,,, | Performed by: INTERNAL MEDICINE

## 2023-03-27 PROCEDURE — 4010F PR ACE/ARB THEARPY RXD/TAKEN: ICD-10-PCS | Mod: CPTII,,, | Performed by: INTERNAL MEDICINE

## 2023-03-27 PROCEDURE — 3008F BODY MASS INDEX DOCD: CPT | Mod: CPTII,,, | Performed by: INTERNAL MEDICINE

## 2023-03-27 PROCEDURE — 80053 COMPREHEN METABOLIC PANEL: CPT | Mod: PN | Performed by: INTERNAL MEDICINE

## 2023-03-27 PROCEDURE — 99214 OFFICE O/P EST MOD 30 MIN: CPT | Mod: PBBFAC,PN | Performed by: INTERNAL MEDICINE

## 2023-03-27 PROCEDURE — 3078F PR MOST RECENT DIASTOLIC BLOOD PRESSURE < 80 MM HG: ICD-10-PCS | Mod: CPTII,,, | Performed by: INTERNAL MEDICINE

## 2023-03-27 PROCEDURE — 85025 COMPLETE CBC W/AUTO DIFF WBC: CPT | Mod: PN | Performed by: INTERNAL MEDICINE

## 2023-03-27 PROCEDURE — 36415 COLL VENOUS BLD VENIPUNCTURE: CPT | Mod: PN | Performed by: INTERNAL MEDICINE

## 2023-03-27 PROCEDURE — 3078F DIAST BP <80 MM HG: CPT | Mod: CPTII,,, | Performed by: INTERNAL MEDICINE

## 2023-03-27 PROCEDURE — 99215 OFFICE O/P EST HI 40 MIN: CPT | Mod: S$PBB,,, | Performed by: INTERNAL MEDICINE

## 2023-03-27 PROCEDURE — 3074F PR MOST RECENT SYSTOLIC BLOOD PRESSURE < 130 MM HG: ICD-10-PCS | Mod: CPTII,,, | Performed by: INTERNAL MEDICINE

## 2023-03-27 RX ORDER — SODIUM CHLORIDE 9 MG/ML
1000 INJECTION, SOLUTION INTRAVENOUS
Status: CANCELLED | OUTPATIENT
Start: 2023-03-30

## 2023-03-27 RX ORDER — SODIUM CHLORIDE 0.9 % (FLUSH) 0.9 %
10 SYRINGE (ML) INJECTION
Status: CANCELLED | OUTPATIENT
Start: 2023-03-30

## 2023-03-27 RX ORDER — HEPARIN 100 UNIT/ML
500 SYRINGE INTRAVENOUS
Status: CANCELLED | OUTPATIENT
Start: 2023-03-28

## 2023-03-27 RX ORDER — ATROPINE SULFATE 0.4 MG/ML
0.4 INJECTION, SOLUTION ENDOTRACHEAL; INTRAMEDULLARY; INTRAMUSCULAR; INTRAVENOUS; SUBCUTANEOUS ONCE AS NEEDED
Status: CANCELLED | OUTPATIENT
Start: 2023-03-28

## 2023-03-27 RX ORDER — SODIUM CHLORIDE 0.9 % (FLUSH) 0.9 %
10 SYRINGE (ML) INJECTION
Status: CANCELLED | OUTPATIENT
Start: 2023-03-28

## 2023-03-27 RX ORDER — LORAZEPAM 2 MG/ML
1 INJECTION INTRAMUSCULAR
Status: CANCELLED | OUTPATIENT
Start: 2023-03-28 | End: 2023-03-28

## 2023-03-27 RX ORDER — HEPARIN 100 UNIT/ML
500 SYRINGE INTRAVENOUS
Status: CANCELLED | OUTPATIENT
Start: 2023-03-30

## 2023-03-28 ENCOUNTER — DOCUMENTATION ONLY (OUTPATIENT)
Dept: INFUSION THERAPY | Facility: HOSPITAL | Age: 53
End: 2023-03-28

## 2023-03-28 ENCOUNTER — INFUSION (OUTPATIENT)
Dept: INFUSION THERAPY | Facility: HOSPITAL | Age: 53
End: 2023-03-28
Attending: INTERNAL MEDICINE
Payer: MEDICAID

## 2023-03-28 VITALS
HEART RATE: 87 BPM | TEMPERATURE: 98 F | HEIGHT: 66 IN | WEIGHT: 292.75 LBS | BODY MASS INDEX: 47.05 KG/M2 | RESPIRATION RATE: 16 BRPM | DIASTOLIC BLOOD PRESSURE: 75 MMHG | SYSTOLIC BLOOD PRESSURE: 123 MMHG

## 2023-03-28 DIAGNOSIS — C77.2 METASTASIS TO INTESTINAL LYMPH NODE: Primary | ICD-10-CM

## 2023-03-28 DIAGNOSIS — C18.7 MALIGNANT NEOPLASM OF SIGMOID COLON: ICD-10-CM

## 2023-03-28 PROCEDURE — 25000003 PHARM REV CODE 250: Mod: PN | Performed by: INTERNAL MEDICINE

## 2023-03-28 PROCEDURE — 96375 TX/PRO/DX INJ NEW DRUG ADDON: CPT | Mod: PN

## 2023-03-28 PROCEDURE — 96417 CHEMO IV INFUS EACH ADDL SEQ: CPT | Mod: PN

## 2023-03-28 PROCEDURE — 96413 CHEMO IV INFUSION 1 HR: CPT | Mod: PN

## 2023-03-28 PROCEDURE — 63600175 PHARM REV CODE 636 W HCPCS: Mod: PN | Performed by: INTERNAL MEDICINE

## 2023-03-28 PROCEDURE — A4216 STERILE WATER/SALINE, 10 ML: HCPCS | Mod: PN | Performed by: INTERNAL MEDICINE

## 2023-03-28 PROCEDURE — 96367 TX/PROPH/DG ADDL SEQ IV INF: CPT | Mod: PN

## 2023-03-28 RX ORDER — LORAZEPAM 2 MG/ML
1 INJECTION INTRAMUSCULAR
Status: COMPLETED | OUTPATIENT
Start: 2023-03-28 | End: 2023-03-28

## 2023-03-28 RX ORDER — SODIUM CHLORIDE 0.9 % (FLUSH) 0.9 %
10 SYRINGE (ML) INJECTION
Status: DISCONTINUED | OUTPATIENT
Start: 2023-03-28 | End: 2023-03-28 | Stop reason: HOSPADM

## 2023-03-28 RX ORDER — ATROPINE SULFATE 0.4 MG/ML
0.4 INJECTION, SOLUTION ENDOTRACHEAL; INTRAMEDULLARY; INTRAMUSCULAR; INTRAVENOUS; SUBCUTANEOUS ONCE AS NEEDED
Status: COMPLETED | OUTPATIENT
Start: 2023-03-28 | End: 2023-03-28

## 2023-03-28 RX ORDER — HEPARIN 100 UNIT/ML
500 SYRINGE INTRAVENOUS
Status: DISCONTINUED | OUTPATIENT
Start: 2023-03-28 | End: 2023-03-28 | Stop reason: HOSPADM

## 2023-03-28 RX ADMIN — SODIUM CHLORIDE 440 MG: 9 INJECTION, SOLUTION INTRAVENOUS at 11:03

## 2023-03-28 RX ADMIN — ATROPINE SULFATE 0.4 MG: 0.4 INJECTION, SOLUTION INTRAVENOUS at 11:03

## 2023-03-28 RX ADMIN — PROMETHAZINE HYDROCHLORIDE 6.25 MG: 25 INJECTION INTRAMUSCULAR; INTRAVENOUS at 10:03

## 2023-03-28 RX ADMIN — PALONOSETRON: 0.05 INJECTION, SOLUTION INTRAVENOUS at 09:03

## 2023-03-28 RX ADMIN — Medication 10 ML: at 01:03

## 2023-03-28 RX ADMIN — SODIUM CHLORIDE: 9 INJECTION, SOLUTION INTRAVENOUS at 09:03

## 2023-03-28 RX ADMIN — Medication 500 UNITS: at 01:03

## 2023-03-28 RX ADMIN — LORAZEPAM 1 MG: 2 INJECTION INTRAMUSCULAR; INTRAVENOUS at 10:03

## 2023-03-28 RX ADMIN — BEVACIZUMAB 680 MG: 400 INJECTION, SOLUTION INTRAVENOUS at 11:03

## 2023-03-28 NOTE — PROGRESS NOTES
Oncology Nutrition   Chemotherapy Infusion Visit    Nutrition Follow Up   RD met with pt at chairside during infusion tx. Pt continues to do well nutritionally - maintaining weight, chewing without difficulty and denies any nutrition related side effects.      Pt eligible for TFP order. Pt requesting produce and donations only. RD will fill and provide to pt at end of infusion tx.       Wt Readings from Last 10 Encounters:   03/28/23 132.8 kg (292 lb 12.3 oz)   03/27/23 126 kg (277 lb 12.5 oz)   03/14/23 134.5 kg (296 lb 8.3 oz)   03/13/23 134.5 kg (296 lb 8.3 oz)   02/28/23 133 kg (293 lb 3.4 oz)   02/27/23 133 kg (293 lb 3.4 oz)   02/16/23 (!) 137.2 kg (302 lb 7.5 oz)   02/14/23 (!) 137.2 kg (302 lb 7.5 oz)   02/13/23 (P) 135.1 kg (297 lb 13.5 oz)   02/03/23 (!) 138.2 kg (304 lb 10.8 oz)       All other nutrition questions/concerns addressed as appropriate. Will continue to monitor prn throughout treatment.     Ciera Richardson, DAVIDN, LDN  03/28/2023  3:27 PM

## 2023-03-28 NOTE — PLAN OF CARE
Pt arrived to clinic today for Avastin/Foliri/Xeloda infusions and tolerated well. No changes throughout therapy. Pt aware of follow up appointments and side effects of drugs. Discharged to home. NAD.

## 2023-03-29 ENCOUNTER — DOCUMENTATION ONLY (OUTPATIENT)
Dept: INFUSION THERAPY | Facility: HOSPITAL | Age: 53
End: 2023-03-29
Payer: MEDICAID

## 2023-03-29 NOTE — PROGRESS NOTES
Late entry for 3/28/23    SW met pt and provided her with food form TFP. Pt reported she has moved into her new apartment and this has helped  some of her financial stress. SW provided support.  No other needs noted at this time.     Radha Giordano, LCSW

## 2023-03-30 ENCOUNTER — OFFICE VISIT (OUTPATIENT)
Dept: PSYCHIATRY | Facility: CLINIC | Age: 53
End: 2023-03-30
Payer: MEDICAID

## 2023-03-30 VITALS
HEART RATE: 123 BPM | HEIGHT: 66 IN | SYSTOLIC BLOOD PRESSURE: 113 MMHG | BODY MASS INDEX: 46.7 KG/M2 | DIASTOLIC BLOOD PRESSURE: 77 MMHG | WEIGHT: 290.56 LBS

## 2023-03-30 DIAGNOSIS — F33.1 MODERATE EPISODE OF RECURRENT MAJOR DEPRESSIVE DISORDER: ICD-10-CM

## 2023-03-30 PROCEDURE — 1160F PR REVIEW ALL MEDS BY PRESCRIBER/CLIN PHARMACIST DOCUMENTED: ICD-10-PCS | Mod: SA,HB,CPTII, | Performed by: NURSE PRACTITIONER

## 2023-03-30 PROCEDURE — 3008F BODY MASS INDEX DOCD: CPT | Mod: SA,HB,CPTII, | Performed by: NURSE PRACTITIONER

## 2023-03-30 PROCEDURE — 99999 PR PBB SHADOW E&M-EST. PATIENT-LVL IV: ICD-10-PCS | Mod: PBBFAC,SA,HB, | Performed by: NURSE PRACTITIONER

## 2023-03-30 PROCEDURE — 90792 PSYCH DIAG EVAL W/MED SRVCS: CPT | Mod: SA,HB,, | Performed by: NURSE PRACTITIONER

## 2023-03-30 PROCEDURE — 4010F ACE/ARB THERAPY RXD/TAKEN: CPT | Mod: SA,HB,CPTII, | Performed by: NURSE PRACTITIONER

## 2023-03-30 PROCEDURE — 3008F PR BODY MASS INDEX (BMI) DOCUMENTED: ICD-10-PCS | Mod: SA,HB,CPTII, | Performed by: NURSE PRACTITIONER

## 2023-03-30 PROCEDURE — 3078F PR MOST RECENT DIASTOLIC BLOOD PRESSURE < 80 MM HG: ICD-10-PCS | Mod: SA,HB,CPTII, | Performed by: NURSE PRACTITIONER

## 2023-03-30 PROCEDURE — 99214 OFFICE O/P EST MOD 30 MIN: CPT | Mod: PBBFAC,PN | Performed by: NURSE PRACTITIONER

## 2023-03-30 PROCEDURE — 3074F PR MOST RECENT SYSTOLIC BLOOD PRESSURE < 130 MM HG: ICD-10-PCS | Mod: SA,HB,CPTII, | Performed by: NURSE PRACTITIONER

## 2023-03-30 PROCEDURE — 90792 PR PSYCHIATRIC DIAGNOSTIC EVALUATION W/MEDICAL SERVICES: ICD-10-PCS | Mod: SA,HB,, | Performed by: NURSE PRACTITIONER

## 2023-03-30 PROCEDURE — 1159F PR MEDICATION LIST DOCUMENTED IN MEDICAL RECORD: ICD-10-PCS | Mod: SA,HB,CPTII, | Performed by: NURSE PRACTITIONER

## 2023-03-30 PROCEDURE — 1160F RVW MEDS BY RX/DR IN RCRD: CPT | Mod: SA,HB,CPTII, | Performed by: NURSE PRACTITIONER

## 2023-03-30 PROCEDURE — 1159F MED LIST DOCD IN RCRD: CPT | Mod: SA,HB,CPTII, | Performed by: NURSE PRACTITIONER

## 2023-03-30 PROCEDURE — 3078F DIAST BP <80 MM HG: CPT | Mod: SA,HB,CPTII, | Performed by: NURSE PRACTITIONER

## 2023-03-30 PROCEDURE — 99999 PR PBB SHADOW E&M-EST. PATIENT-LVL IV: CPT | Mod: PBBFAC,SA,HB, | Performed by: NURSE PRACTITIONER

## 2023-03-30 PROCEDURE — 4010F PR ACE/ARB THEARPY RXD/TAKEN: ICD-10-PCS | Mod: SA,HB,CPTII, | Performed by: NURSE PRACTITIONER

## 2023-03-30 PROCEDURE — 3074F SYST BP LT 130 MM HG: CPT | Mod: SA,HB,CPTII, | Performed by: NURSE PRACTITIONER

## 2023-03-30 RX ORDER — FLUOXETINE HYDROCHLORIDE 20 MG/1
20 CAPSULE ORAL DAILY
Qty: 30 CAPSULE | Refills: 1 | Status: SHIPPED | OUTPATIENT
Start: 2023-03-30 | End: 2023-08-09 | Stop reason: SDUPTHER

## 2023-03-30 NOTE — PROGRESS NOTES
Outpatient Psychiatry Initial Visit  03/30/2023    ID:  51 yo F presenting for an initial evaluation. Met with patient.    Reason for encounter: Dr. Palm referred. Patient complains of anxiety/depression    History of Present Illness: Pt. is a 51 yo F with a past psychiatric hx of  mdd, illness anxiety disorder presenting to the clinic for an initial evaluation and treatment. PMHx outlined below - seeing Dr. Palm for therapy secondary to colon cancer diagnosis. Pt presented to me taking Wellbutrin SR 150mg BID, Buspar 10mg BID,  (uses PRN although she knows it should be routuine), Ativan 1mg QHS PRN (does use nightly) and notes past trials of various antidepressants that she is unable to recall. Denies hx of inpatient psych admission. No formal outpatient psych care outside of psychology.     Pt notes a history of anxiety but does feel that this is too problematic today outside of situational stressors. Has had some recent complications/reactions with her chemotherapy     Pt notes longstanding history of depression, dating back to adolescence. Pt notes often feeling hopeless and worthless, and guilty. Many of her depressive symptoms over the course of her life have come without trigger. Pt was diagnosed with colon cancer in 2020, which she does feel significantly exacerbated her symptoms. Pt notes bouts of tearfulness, apathy, and anhedonia. Quality of sleep varies, Ativan has been helpful.     Pt currently endorses or denies the following symptoms:  Psych ROS:  Depression: + anhedonia, + hopelesness, +apathy, + fatigue and poor focus, dec'd appetite  Anxiety: no panic attacks, no agoraphobia, no social anxiety  PTSD: no flashbacks, nightmares, or avoidance of stimuli  Gissel/Psychosis: No manic episodes, no A/V hallucinations  SI - No SI - access to guns? Yes, secure    Past Psychiatric History:  Past Psych Hx: First psych contact: denies  Prior hospitalizations: denies Prior suicide attempts or self harm:  denies  Prior diagnosis:   Prior meds:  Current meds:   Prior psychotherapy:       Past Medical Hx: Hx of TBI?   denies   Hx of seizures? denies  Past Medical History:   Diagnosis Date    Anxiety     Depression     FH: ovarian cancer 3/16/2020    Hx of psychiatric care     Effexor, Paxil, Lexapro, Zoloft, Wellbutrin, Trazodone Buspar    Hyperthyroidism     Hypothyroid     Kidney calculi     Malignant neoplasm of sigmoid colon 3/16/2020    Menorrhagia     Multinodular goiter 2012    Palpitation     Psychiatric problem     Venous insufficiency          Past Surgical Hx:  Past Surgical History:   Procedure Laterality Date     SECTION, CLASSIC      x3    COLONOSCOPY N/A 2020    Procedure: COLONOSCOPY;  Surgeon: Shane Parker MD;  Location: [facility] ENDO;  Service: Endoscopy;  Laterality: N/A;    COLONOSCOPY N/A 2022    Procedure: COLONOSCOPY;  Surgeon: Shane Parker MD;  Location: [facility] ENDO;  Service: Endoscopy;  Laterality: N/A;    CYSTOSCOPY W/ URETERAL STENT PLACEMENT Bilateral 3/25/2020    Procedure: CYSTOSCOPY, WITH URETERAL STENT INSERTION;  Surgeon: Claudio Tyson MD;  Location: [facility] OR [address] [place] [address];  Service: Urology;  Laterality: Bilateral;    INSERTION OF TUNNELED CENTRAL VENOUS CATHETER (CVC) WITH SUBCUTANEOUS PORT N/A 2020    Procedure: [id number];  Sur[facility]: [facility];  Location: [facility] OR [address] [place] [address];  Service: Radiology;  Laterality: N/A;  Room [facility]    LAPAROSCOPIC SIGMOIDECTOMY N/A 3/25/2020    Procedure: COLECTOMY, SIGMOID, LAPAROSCOPIC, flex sig, ERAS high;  Surgeon: Silvio Man MD;  [facility];  Service: Colon and Rectal;  Laterality: N/A;    MOBILIZATION OF SPLENIC FLEXURE  3/25/2020    Procedure: MOBILIZATION, SPLENIC FLEXURE;  Surgeon: Silvio Man MD;  Location: [facility] OR [address] [place] [address];  Service: Colon and Rectal;;    tonsillectomy      TOTAL ABDOMINAL HYSTERECTOMY W/ BILATERAL SALPINGOOPHORECTOMY N/A 3/25/2020     Procedure: HYSTERECTOMY, TOTAL, ABDOMINAL, WITH BILATERAL SALPINGO-OOPHORECTOMY;  Surgeon: Keron Brady MD;  Location: [facility] OR [address] [place] [address];  Service: Oncology;  Laterality: N/A;           Family Hx:   Paternal: unaware of any mental illness  Maternal: unaware of any mental illness, pt suspects possible bipolar       Social Hx:   Childhood: b/r by both biological parents, good childhood  Marital Status:   Children:  Resides: in [place], with daughter, age 24  Occupation: on disability  Hobbies: sewing  Muslim: Yarsanism  Education level: 1 year of college  :   Legal:     Substance Hx:  Tobacco: denies  Alcohol: casual  Drug use: denies  Caffeine: 2-3 cups coffee  Rehab: denies  Prior/current AA?    Review of Symptoms  GENERAL: +  weight gain/loss  SKIN: + blistering from chemotherapy  HEAD: no headahces  EYES: no jaundice, blindness. No exophthalmos  EARS: no dizziness, tinnitus, or hearing loss  NOSE: no changes in smell  Mouth/throat: no dyskinetic movements or obvious goiter  CHEST: no SOB, hyperventilation or cough  CARDIO: no tachycardia, bradycardia, or chest pain  ABDOMEN: + nausea, vomiting, pain, + constipation, or diarrhea  URINARY:  no frequency, dysuria, or sexual dysfunction  ENDOCRINE: No polydipsia, polyuria, no cold/hot intolerance  MUSCULOSKELETAL: no joint pain/stiffness  NEUROLOGIC: no weakness or sensory changes, no seizures, no confusion, memory loss, or forgetfulness, no tremor or abnormal movements    Current Evaluation:  Nutritional Screening:  Considering the patient's height and weight, medications, medical history and preferences, should a referral be made to the dietitian? No  Vitals: most recent vitals signs, dated greater than 90 days prior to this appointment, were reviewed  General: age appropriate, well nourished, casually dressed, neatly groomed  MSK: muscle strength/tone : no tremor or abnormal movements. Gait/Station: no ataxic, steady    Suicide Risk  "Assessment:  Protective factors: age, gender, no prior attempts, no prior hospitalizations, no ongoing substance abuse, no psychosis, , has children denies SI/intent/plan, seeking treatment, access to treatment, future oriented, good primary support, no access    Risks: low    Patient is a low immediate and long-term risk considering risk factors    Psychiatric:  Speech: Normal rate, rhythm, volume. No latency, no pressured speech  Mood/Affect: euthymic, congruent and appropriate   Though Process: organized, logical, linear  Thought Content: no suicidal or homicidal ideation, no A/V hallucinations, delusions or paranoia  Insight: Intact; aware of illness  Judgement: behavior is adequate to circumstances  Orientation: A&O x 4,  Memory: Intact for content of interview, 3/3 immediate, 3/3 after 3 mins. Able to recall recent and remote events.  Language: Grossly intact, no aphasias   Concentration: Spells "world" correctly forward & backwards  Knowledge/Intelligence: appropriate to age and level of education.   Spouse/Partner: Supportive    ASSESSMENT - DIAGNOSIS - GOALS:  Impression:                Pt. is a 51 yo F with a past psychiatric hx of  mdd, illness anxiety disorder presenting to the clinic for an initial evaluation and treatment. PMHx outlined below - seeing Dr. Palm for therapy secondary to colon cancer diagnosis. Pt presented to me taking Wellbutrin SR 150mg BID, Buspar 10mg BID,  (uses PRN although she knows it should be routuine), Ativan 1mg QHS PRN (does use nightly) and notes past trials of various antidepressants that she is unable to recall. Denies hx of inpatient psych admission. No formal outpatient psych care outside of psychology.     Pt notes a history of anxiety but does feel that this is too problematic today outside of situational stressors. Has had some recent complications/reactions with her chemotherapy     Pt notes longstanding history of depression, dating back to adolescence. Pt " notes often feeling hopeless and worthless, and guilty. Many of her depressive symptoms over the course of her life have come without trigger. Pt was diagnosed with colon cancer in 2020, which she does feel significantly exacerbated her symptoms. Pt notes bouts of tearfulness, apathy, and anhedonia. Quality of sleep varies, Ativan has been helpful.             Safe for outpatient tx and no acute safety concerns.  Diagnosis/Diagnoses: mdd, illness anxiety disorder    Strengths/Liabilities: Patient accepts feedback & guidance. Patient is motivated for change.     Treatment Goals: Specify outcomes written in observable, behavioral terms  Anxiety: acquire relapse prevention skills, reduce physical symptoms of anxiety, reduce time spent worrying (>30 minutes/day)  Depression: Acquire relapse prevention skills, increasing energy, increasing interest in usual activities, increasing motivation, reducing excessive guilt and reducing fatigue.    Treatment Plan/Recommendations:   Medication Management: The risks and benefits of medication were discussed with the patient.   Meds:    1) Cont Prozac 20mg QD.  Discussed potential for GI side effects, sexual dysfunction, mood destabilization, headaches    2) Switch Wellbutrin to PM dosing next visit    3) Stop Buspar    4) Cont Ativan 1mg QHS. Discussed risk of decreased RT, sedation, addictive potential, and not to mix with alcohol.       Labs: no new orders  Return to Clinic: 4-6 weeks  Counseling time: 35 mins  Total time: 60 mins    -  Patient given contact # for psychotherapists at [facility] and also instructed they may check with insurance for a list of providers.   -Call to report any worsening of symptoms or problems associated with medication  - Pt instructed to go to ER if thoughts of harming self or others arise     -Spent 60min face to face with the pt; >50% time spent in counseling   -Supportive therapy and psychoeducation provided  -R/B/SE's of medications  discussed with the pt who expresses understanding and chooses to take medications as prescribed.   -Pt instructed to call clinic, 911 or go to nearest emergency room if sxs worsen or pt is in   crisis. The pt expresses understanding.    Sammy Pino, NP

## 2023-03-31 ENCOUNTER — PATIENT MESSAGE (OUTPATIENT)
Dept: PSYCHIATRY | Facility: CLINIC | Age: 53
End: 2023-03-31
Payer: MEDICAID

## 2023-04-03 ENCOUNTER — PATIENT MESSAGE (OUTPATIENT)
Dept: ENDOCRINOLOGY | Facility: CLINIC | Age: 53
End: 2023-04-03
Payer: MEDICAID

## 2023-04-03 ENCOUNTER — PATIENT MESSAGE (OUTPATIENT)
Dept: PHARMACY | Facility: CLINIC | Age: 53
End: 2023-04-03
Payer: MEDICAID

## 2023-04-04 ENCOUNTER — OFFICE VISIT (OUTPATIENT)
Dept: PSYCHIATRY | Facility: CLINIC | Age: 53
End: 2023-04-04
Payer: MEDICAID

## 2023-04-04 DIAGNOSIS — C18.7 MALIGNANT NEOPLASM OF SIGMOID COLON: ICD-10-CM

## 2023-04-04 DIAGNOSIS — F41.1 ANXIETY ASSOCIATED WITH CANCER DIAGNOSIS: ICD-10-CM

## 2023-04-04 DIAGNOSIS — C80.1 ANXIETY ASSOCIATED WITH CANCER DIAGNOSIS: ICD-10-CM

## 2023-04-04 DIAGNOSIS — F33.1 MODERATE EPISODE OF RECURRENT MAJOR DEPRESSIVE DISORDER: Primary | ICD-10-CM

## 2023-04-04 PROCEDURE — 4010F PR ACE/ARB THEARPY RXD/TAKEN: ICD-10-PCS | Mod: AH,HB,CPTII, | Performed by: PSYCHOLOGIST

## 2023-04-04 PROCEDURE — 90837 PSYTX W PT 60 MINUTES: CPT | Mod: AH,HB,, | Performed by: PSYCHOLOGIST

## 2023-04-04 PROCEDURE — 4010F ACE/ARB THERAPY RXD/TAKEN: CPT | Mod: AH,HB,CPTII, | Performed by: PSYCHOLOGIST

## 2023-04-04 PROCEDURE — 90837 PR PSYCHOTHERAPY W/PATIENT, 60 MIN: ICD-10-PCS | Mod: AH,HB,, | Performed by: PSYCHOLOGIST

## 2023-04-04 NOTE — PROGRESS NOTES
PSYCHO-ONCOLOGY NOTE/ Individual Psychotherapy     Date: 4/4/2023   Site:  [facility]      Therapeutic Intervention: Met with patient.  Outpatient - Insight oriented psychotherapy 60 min - CPT code 39490, Outpatient - Behavior modifying psychotherapy 60 min - CPT code 71364, and Outpatient - Supportive psychotherapy 60 min - CPT Code 38479    This includes face to face time and non-face to face time preparing to see the patient, obtaining and/or reviewing separately obtained history, documenting clinical information in the electronic or other health record, independently interpreting results and communicating results to the patient/family/caregiver, or care coordinator.      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    Anxiety    Insomnia    Insomnia    Anxiety    Other constipation    Chemotherapy-induced nausea    Generalized anxiety disorder with panic attacks    Chemotherapy adverse reaction    Mucositis due to chemotherapy    Morbid obesity    Secondary adenocarcinoma of lymph node    Thyroid nodule    Hypercalcemia    Transaminitis    Dysuria    Chemotherapy-induced neutropenia    Hypophosphatemia    Functional diarrhea    Primary hypertension       Chief complaint/reason for encounter: adjustment to illness, depression and anxiety      Met with patient to evaluate psychosocial adaptation to diagnosis/treatment of metastatic colon cancer    Current Medications  Current Outpatient Medications   Medication    acetaminophen (TYLENOL) 500 MG tablet    benzonatate (TESSALON PERLES) 100 MG capsule    buPROPion (WELLBUTRIN SR)  150 MG TBSR 12 hr tablet    busPIRone (BUSPAR) 10 MG tablet    capecitabine (XELODA) 500 MG Tab    FLUoxetine (PROZAC) 20 MG capsule    granisetron (SANCUSO) 3.1 mg/24 hour    Lactobacillus rhamnosus GG (CULTURELLE) 10 billion cell capsule    lactulose (CHRONULAC) 10 gram/15 mL solution    levothyroxine (SYNTHROID) 200 MCG tablet    LIDOcaine-prilocaine (EMLA) cream    LORazepam (ATIVAN) 1 MG tablet    magic mouthwash diphen/antac/lidoc/nysta    multivitamin (THERAGRAN) per tablet    olmesartan (BENICAR) 20 MG tablet    ondansetron (ZOFRAN-ODT) 8 MG TbDL    prochlorperazine (COMPAZINE) 10 MG tablet    promethazine (PHENERGAN) 12.5 MG Tab    senna-docusate 8.6-50 mg (PERICOLACE) 8.6-50 mg per tablet    traMADoL (ULTRAM) 50 mg tablet    traZODone (DESYREL) 50 MG tablet     No current facility-administered medications for this visit.       ONCOLOGY HISTORY  Oncology History   Malignant neoplasm of sigmoid colon   3/16/2020 Initial Diagnosis    Malignant neoplasm of sigmoid colon       3/31/2020 Cancer Staged    Staging form: Colon and Rectum, AJCC 8th Edition  - Clinical stage from 3/31/2020: Stage IIIC (cT4b, cN2a, cM0)       5/6/2020 - 11/17/2020 Chemotherapy    Treatment Summary   Plan Name: OP FOLFOX 6 Q2W  Treatment Goal: Curative  Status: Inactive  Start Date: 5/6/2020  End Date: 11/6/2020  Provider: Dylan Leyva MD  Chemotherapy: fluorouraciL injection 945 mg, 400 mg/m2 = 945 mg, Intravenous, Clinic/[facility] 1 time, 14 of 14 cycles  Administration: 945 mg (5/6/2020), 945 mg (5/20/2020), 945 mg (6/3/2020), 945 mg (6/17/2020), 945 mg (7/29/2020), 945 mg (8/12/2020), 945 mg (8/26/2020), 945 mg (9/9/2020), 945 mg (9/23/2020), 945 mg (10/6/2020), 945 mg (10/21/2020), 945 mg (11/4/2020)  fluorouraciL 2,400 mg/m2 = 5,665 mg in sodium chloride 0.9% 240 mL chemo infusion, 2,400 mg/m2 = 5,665 mg, Intravenous, Over 46 hours, 14 of 14 cycles  Administration: 5,665 mg (5/6/2020), 5,665 mg (5/20/2020), 5,665 mg (6/3/2020),  5,665 mg (6/17/2020), 5,665 mg (7/29/2020), 5,665 mg (8/12/2020), 5,665 mg (8/26/2020), 5,665 mg (9/9/2020), 5,665 mg (9/23/2020), 5,665 mg (10/6/2020), 5,665 mg (10/21/2020), 5,665 mg (11/4/2020)  leucovorin calcium 900 mg in dextrose 5 % 250 mL infusion, 945 mg, Intravenous, Clinic/[facility] 1 time, 14 of 14 cycles  Administration: 900 mg (5/6/2020), 900 mg (5/20/2020), 900 mg (6/3/2020), 900 mg (6/17/2020), 945 mg (6/30/2020), 945 mg (7/20/2020), 945 mg (7/29/2020), 945 mg (8/12/2020), 945 mg (8/26/2020), 945 mg (9/9/2020), 945 mg (9/23/2020), 945 mg (10/6/2020), 945 mg (10/21/2020), 945 mg (11/4/2020)  oxaliplatin (ELOXATIN) 200 mg in dextrose 5 % 500 mL chemo infusion, 201 mg, Intravenous, Clinic/[facility] 1 time, 6 of 6 cycles  Dose modification: 65 mg/m2 (original dose 85 mg/m2, Cycle 6)  Administration: 200 mg (5/6/2020), 200 mg (5/20/2020), 200 mg (6/3/2020), 200 mg (6/17/2020), 201 mg (6/30/2020), 150 mg (7/20/2020)       6/16/2021 -  Chemotherapy    Treatment Summary   Plan Name: OP COLORECTAL FOLFIRI + BEVACIZUMAB Q2W  Treatment Goal: Control  Status: Active  Start Date: 6/16/2021  End Date: 5/11/2023 (Planned)  Provider: Marah Santo MD  Chemotherapy: fluorouraciL injection 990 mg, 400 mg/m2 = 990 mg, Intravenous, Clinic/[facility] 1 time, 15 of 15 cycles  Administration: 990 mg (6/16/2021), 990 mg (6/30/2021), 990 mg (7/14/2021), 980 mg (7/28/2021), 990 mg (8/11/2021), 990 mg (8/25/2021), 990 mg (9/8/2021), 990 mg (9/22/2021), 990 mg (10/6/2021), 990 mg (10/20/2021), 990 mg (11/3/2021), 990 mg (12/1/2021), 990 mg (12/15/2021), 990 mg (11/17/2021), 990 mg (12/28/2021)  fluorouraciL 2,400 mg/m2 = 5,950 mg in sodium chloride 0.9% 240 mL chemo infusion, 2,400 mg/m2 = 5,950 mg, Intravenous, Over 46 hours, 36 of 39 cycles  Administration: 5,950 mg (6/16/2021), 5,950 mg (6/30/2021), 5,950 mg (7/14/2021), 5,880 mg (7/28/2021), 5,930 mg (8/11/2021), 5,930 mg (8/25/2021), 5,930 mg (9/8/2021), 5,930 mg (9/22/2021), 5,930 mg  (10/6/2021), 5,930 mg (10/20/2021), 5,930 mg (11/3/2021), 5,930 mg (12/1/2021), 5,930 mg (12/15/2021), 5,930 mg (11/17/2021), 5,930 mg (12/28/2021), 6,050 mg (5/11/2022), 6,025 mg (3/9/2022), 6,025 mg (2/23/2022), 5,930 mg (2/2/2022), 5,930 mg (1/19/2022), 6,050 mg (4/20/2022), 6,025 mg (4/6/2022), 6,025 mg (3/23/2022), 6,050 mg (6/1/2022), 6,050 mg (6/15/2022), 6,050 mg (10/19/2022), 6,050 mg (10/5/2022), 6,050 mg (11/2/2022), 6,050 mg (11/16/2022), 6,050 mg (11/30/2022), 6,050 mg (1/4/2023), 6,050 mg (12/19/2022), 6,050 mg (1/18/2023), 6,000 mg (2/28/2023), 6,050 mg (2/14/2023), 6,000 mg (2/1/2023)  bevacizumab (AVASTIN) 600 mg in sodium chloride 0.9% 100 mL chemo infusion, 660 mg, Intravenous, [facility] 1 time, 36 of 36 cycles  Dose modification: 5 mg/kg (original dose 5 mg/kg, Cycle 26, Reason: MD Discretion, Comment: 5 mg/kg original dose)  Administration: 600 mg (6/30/2021), 600 mg (7/14/2021), 600 mg (7/28/2021), 600 mg (6/16/2021), 600 mg (8/11/2021), 655 mg (8/25/2021), 600 mg (9/8/2021), 600 mg (9/22/2021), 600 mg (10/6/2021), 600 mg (10/20/2021), 600 mg (11/3/2021), 655 mg (12/1/2021), 600 mg (12/15/2021), 600 mg (11/17/2021), 600 mg (12/28/2021), 600 mg (5/11/2022), 600 mg (3/9/2022), 600 mg (2/23/2022), 600 mg (2/2/2022), 600 mg (1/19/2022), 600 mg (4/20/2022), 680 mg (4/6/2022), 600 mg (3/23/2022), 680 mg (10/5/2022), 680 mg (10/19/2022), 680 mg (11/2/2022), 680 mg (11/16/2022), 680 mg (11/30/2022), 680 mg (1/4/2023), 680 mg (12/19/2022), 680 mg (1/18/2023), 680 mg (3/28/2023), 680 mg (3/14/2023), 680 mg (2/28/2023), 680 mg (2/14/2023), 680 mg (2/1/2023)  irinotecan (CAMPTOSAR) 440 mg in sodium chloride 0.9% 500 mL chemo infusion, 446 mg, Intravenous, [facility] 1 time, 38 of 41 cycles  Dose modification: 180 mg/m2 (original dose 180 mg/m2, Cycle 24)  Administration: 440 mg (6/16/2021), 440 mg (6/30/2021), 440 mg (7/14/2021), 440 mg (7/28/2021), 440 mg (8/11/2021), 444 mg (8/25/2021), 440 mg  (9/8/2021), 440 mg (9/22/2021), 440 mg (10/6/2021), 440 mg (10/20/2021), 440 mg (11/3/2021), 440 mg (12/1/2021), 440 mg (12/15/2021), 440 mg (11/17/2021), 440 mg (12/28/2021), 440 mg (5/11/2022), 440 mg (3/9/2022), 440 mg (2/23/2022), 440 mg (2/2/2022), 440 mg (1/19/2022), 440 mg (4/20/2022), 440 mg (4/6/2022), 440 mg (3/24/2022), 440 mg (6/1/2022), 440 mg (6/15/2022), 440 mg (10/19/2022), 440 mg (10/5/2022), 440 mg (11/2/2022), 440 mg (11/16/2022), 440 mg (11/30/2022), 440 mg (1/4/2023), 440 mg (12/19/2022), 440 mg (1/18/2023), 440 mg (3/28/2023), 440 mg (3/14/2023), 440 mg (2/28/2023), 440 mg (2/14/2023), 440 mg (2/1/2023)  bevacizumab-awwb (MVASI) 5 mg/kg = 680 mg in sodium chloride 0.9% 100 mL infusion, 5 mg/kg = 680 mg (original dose ), Intravenous, Clinic/[facility] 1 time, 0 of 3 cycles  Dose modification: 5 mg/kg (Cycle 39)       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   4/3/2020 Initial Diagnosis    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       5/6/2020 - 11/17/2020 Chemotherapy    Treatment Summary   Plan Name: OP FOLFOX 6 Q2W  Treatment Goal: Curative  Status: Inactive  Start Date: 5/6/2020  End Date: 11/6/2020  Provider: Dylan Leyva MD  Chemotherapy: fluorouraciL injection 945 mg, 400 mg/m2 = 945 mg, Intravenous, Clinic/[facility] 1 time, 14 of 14 cycles  Administration: 945 mg (5/6/2020), 945 mg (5/20/2020), 945 mg (6/3/2020), 945 mg (6/17/2020), 945 mg (7/29/2020), 945 mg (8/12/2020), 945 mg (8/26/2020), 945 mg (9/9/2020), 945 mg (9/23/2020), 945 mg (10/6/2020), 945 mg (10/21/2020), 945 mg (11/4/2020)  fluorouraciL 2,400 mg/m2 = 5,665 mg in sodium chloride 0.9% 240 mL chemo infusion, 2,400 mg/m2 = 5,665 mg, Intravenous, Over 46 hours, 14 of 14 cycles  Administration: 5,665 mg (5/6/2020), 5,665 mg (5/20/2020), 5,665 mg (6/3/2020), 5,665 mg (6/17/2020), 5,665 mg (7/29/2020), 5,665 mg (8/12/2020), 5,665 mg (8/26/2020), 5,665 mg (9/9/2020), 5,665 mg (9/23/2020), 5,665 mg (10/6/2020), 5,665 mg (10/21/2020), 5,665 mg  (11/4/2020)  leucovorin calcium 900 mg in dextrose 5 % 250 mL infusion, 945 mg, Intravenous, Clinic/H[facility] 1 time, 14 of 14 cycles  Administration: 900 mg (5/6/2020), 900 mg (5/20/2020), 900 mg (6/3/2020), 900 mg (6/17/2020), 945 mg (6/30/2020), 945 mg (7/20/2020), 945 mg (7/29/2020), 945 mg (8/12/2020), 945 mg (8/26/2020), 945 mg (9/9/2020), 945 mg (9/23/2020), 945 mg (10/6/2020), 945 mg (10/21/2020), 945 mg (11/4/2020)  oxaliplatin (ELOXATIN) 200 mg in dextrose 5 % 500 mL chemo infusion, 201 mg, Intravenous, Clinic/H[facility] 1 time, 6 of 6 cycles  Dose modification: 65 mg/m2 (original dose 85 mg/m2, Cycle 6)  Administration: 200 mg (5/6/2020), 200 mg (5/20/2020), 200 mg (6/3/2020), 200 mg (6/17/2020), 201 mg (6/30/2020), 150 mg (7/20/2020)       6/16/2021 -  Chemotherapy    Treatment Summary   Plan Name: OP COLORECTAL FOLFIRI + BEVACIZUMAB Q2W  Treatment Goal: Control  Status: Active  Start Date: 6/16/2021  End Date: 5/11/2023 (Planned)  Provider: Marah Santo MD  Chemotherapy: fluorouraciL injection 990 mg, 400 mg/m2 = 990 mg, Intravenous, Clinic/HOD 1 time, 15 of 15 cycles  Administration: 990 mg (6/16/2021), 990 mg (6/30/2021), 990 mg (7/14/2021), 980 mg (7/28/2021), 990 mg (8/11/2021), 990 mg (8/25/2021), 990 mg (9/8/2021), 990 mg (9/22/2021), 990 mg (10/6/2021), 990 mg (10/20/2021), 990 mg (11/3/2021), 990 mg (12/1/2021), 990 mg (12/15/2021), 990 mg (11/17/2021), 990 mg (12/28/2021)  fluorouraciL 2,400 mg/m2 = 5,950 mg in sodium chloride 0.9% 240 mL chemo infusion, 2,400 mg/m2 = 5,950 mg, Intravenous, Over 46 hours, 36 of 39 cycles  Administration: 5,950 mg (6/16/2021), 5,950 mg (6/30/2021), 5,950 mg (7/14/2021), 5,880 mg (7/28/2021), 5,930 mg (8/11/2021), 5,930 mg (8/25/2021), 5,930 mg (9/8/2021), 5,930 mg (9/22/2021), 5,930 mg (10/6/2021), 5,930 mg (10/20/2021), 5,930 mg (11/3/2021), 5,930 mg (12/1/2021), 5,930 mg (12/15/2021), 5,930 mg (11/17/2021), 5,930 mg (12/28/2021), 6,050 mg (5/11/2022), 6,025 mg  (3/9/2022), 6,025 mg (2/23/2022), 5,930 mg (2/2/2022), 5,930 mg (1/19/2022), 6,050 mg (4/20/2022), 6,025 mg (4/6/2022), 6,025 mg (3/23/2022), 6,050 mg (6/1/2022), 6,050 mg (6/15/2022), 6,050 mg (10/19/2022), 6,050 mg (10/5/2022), 6,050 mg (11/2/2022), 6,050 mg (11/16/2022), 6,050 mg (11/30/2022), 6,050 mg (1/4/2023), 6,050 mg (12/19/2022), 6,050 mg (1/18/2023), 6,000 mg (2/28/2023), 6,050 mg (2/14/2023), 6,000 mg (2/1/2023)  bevacizumab (AVASTIN) 600 mg in sodium chloride 0.9% 100 mL chemo infusion, 660 mg, Intravenous, [facility] 1 time, 36 of 36 cycles  Dose modification: 5 mg/kg (original dose 5 mg/kg, Cycle 26, Reason: MD Discretion, Comment: 5 mg/kg original dose)  Administration: 600 mg (6/30/2021), 600 mg (7/14/2021), 600 mg (7/28/2021), 600 mg (6/16/2021), 600 mg (8/11/2021), 655 mg (8/25/2021), 600 mg (9/8/2021), 600 mg (9/22/2021), 600 mg (10/6/2021), 600 mg (10/20/2021), 600 mg (11/3/2021), 655 mg (12/1/2021), 600 mg (12/15/2021), 600 mg (11/17/2021), 600 mg (12/28/2021), 600 mg (5/11/2022), 600 mg (3/9/2022), 600 mg (2/23/2022), 600 mg (2/2/2022), 600 mg (1/19/2022), 600 mg (4/20/2022), 680 mg (4/6/2022), 600 mg (3/23/2022), 680 mg (10/5/2022), 680 mg (10/19/2022), 680 mg (11/2/2022), 680 mg (11/16/2022), 680 mg (11/30/2022), 680 mg (1/4/2023), 680 mg (12/19/2022), 680 mg (1/18/2023), 680 mg (3/28/2023), 680 mg (3/14/2023), 680 mg (2/28/2023), 680 mg (2/14/2023), 680 mg (2/1/2023)  irinotecan (CAMPTOSAR) 440 mg in sodium chloride 0.9% 500 mL chemo infusion, 446 mg, Intravenous, [facility] 1 time, 38 of 41 cycles  Dose modification: 180 mg/m2 (original dose 180 mg/m2, Cycle 24)  Administration: 440 mg (6/16/2021), 440 mg (6/30/2021), 440 mg (7/14/2021), 440 mg (7/28/2021), 440 mg (8/11/2021), 444 mg (8/25/2021), 440 mg (9/8/2021), 440 mg (9/22/2021), 440 mg (10/6/2021), 440 mg (10/20/2021), 440 mg (11/3/2021), 440 mg (12/1/2021), 440 mg (12/15/2021), 440 mg (11/17/2021), 440 mg (12/28/2021), 440 mg  (5/11/2022), 440 mg (3/9/2022), 440 mg (2/23/2022), 440 mg (2/2/2022), 440 mg (1/19/2022), 440 mg (4/20/2022), 440 mg (4/6/2022), 440 mg (3/24/2022), 440 mg (6/1/2022), 440 mg (6/15/2022), 440 mg (10/19/2022), 440 mg (10/5/2022), 440 mg (11/2/2022), 440 mg (11/16/2022), 440 mg (11/30/2022), 440 mg (1/4/2023), 440 mg (12/19/2022), 440 mg (1/18/2023), 440 mg (3/28/2023), 440 mg (3/14/2023), 440 mg (2/28/2023), 440 mg (2/14/2023), 440 mg (2/1/2023)  bevacizumab-awwb (MVASI) 5 mg/kg = 680 mg in sodium chloride 0.9% 100 mL infusion, 5 mg/kg = 680 mg (original dose ), Intravenous, Clinic/HOD 1 time, 0 of 3 cycles  Dose modification: 5 mg/kg (Cycle 39)           Objective:  Gail Mckinnon arrived promptly for the session. Ms. Mckinnon was independently ambulatory at the time of session. The patient was fully cooperative throughout the session.  Appearance: age appropriate, casually  dressed, well groomed  Behavior/Cooperation: friendly and cooperative  Speech: normal in rate, volume, and tone and appropriate quality, quantity and organization of sentences  Mood: anxious, sad in discussion of mortality  Affect: mood congruent  Thought Process: goal-directed, logical  Thought Content: normal,  No delusions or paranoia; did not appear to be responding to internal stimuli during the session  Orientation: grossly intact  Memory: grossly intact  Attention Span/Concentration: Attends to session without distraction; reports no difficulty  Fund of Knowledge: average  Estimate of Intelligence: average from verbal skills and history  Cognition: grossly intact  Insight: patient has awareness of illness; good insight into own behavior and behavior of others  Judgment: the patient's behavior is adequate to circumstances    NCCN Distress thermometer:   DISTRESS SCREENING 3/24/2023 3/13/2023 2/27/2023 2/21/2023 1/11/2023 1/4/2023 12/28/2022   Distress Score 8 0 - No Distress 0 - No Distress 6 5 0 - No Distress 5   Practical Problems  "Housing;Insurance/Financial None of these None of these Housing;Insurance/Financial Housing;Insurance/Financial;Work/School;Treatment Decisions None of these Housing;Insurance/Financial;Treatment Decisions   Family Problems Dealing with Children;Family Health Issues None of these None of these None of these Family Health Issues None of these Family Health Issues   Emotional Problems Depression;Sadness None of these None of these Depression;Fears;Sadness;Worry Depression;Fears;Sadness;Worry None of these Depression;Fears;Sadness;Worry   Spiritual / Faith Concerns No No No No No No No   Physical Problems Fatigue;Mouth Sores;Skin Dry/Itchy;Sleep;Tingling in hands or feet None of these None of these Fatigue;Mouth Sores;Nausea;Skin Dry/Itchy;Tingling in hands or feet Appearance;Fatigue;Feeling Swollen;Memory/Concentration;Mouth Sores;Nausea;Skin Dry/Itchy;Tingling in hands or feet None of these Appearance;Fatigue;Feeling Swollen;Memory/Concentration;Mouth Sores;Tingling in hands or feet   Other Problems - - - - - - -   Some encounter information is confidential and restricted. Go to Review Flowsheets activity to see all data.        Interval history and content of current session: Discussed adaptation to chronic illness and tx status. Reports to be coping w/ increasing difficulty, noting concern that addition of Prozac has aided in mood but left her feeling less responsible for the actions of her children-discussed at length. Pt further noted difficulty in setting boundaries w/ providing others w/ care, stating that she continues to hide troubles from daughter ("wearing a mask") which ultimately leads to enhanced tasks/difficulties in home. Evaluated cognitive response, paying particular attention to negative intrusive thoughts of a persistent and detrimental nature. Thoughts of this type are in evidence with moderate distress. Provided cognitive behavioral therapy to address negative cognitions. Discussed importance " of honesty and impact of stress on immunological function. Identified and evaluated psychosocial and environmental stressors secondary to diagnosis and treatment.  Examined proactive behaviors that may be implemented to minimize or ameliorate psychosocial stressors secondary to chronic illness and treatment.     Risk parameters:   Patient reports no suicidal ideation  Patient reports no homicidal ideation  Patient reports no self-injurious behavior  Patient reports no violent behavior   Safety needs:  None at this time      Verbal deficits: None     Patient's response to intervention:The patient's response to intervention is accepting, motivated.     Progress toward goals and other mental status changes:  The patient's progress toward goals is good.      Progress to date:Progress as Expected      Goals from last visit: Met        Patient Strengths: verbal, motivated, intelligent, successful, good social support, good insight, commitment to wellness, strong cultural traditions        Treatment Plan:individual psychotherapy  Target symptoms: depression, anxiety, adjustment  Why chosen therapy is appropriate versus another modality: relevant to diagnosis, patient responds to this modality, evidence based practice  Outcome monitoring methods: self-report, observation, tx team feedback  Therapeutic intervention type: insight oriented psychotherapy, supportive psychotherapy  Prognosis: Good                            Behavioral goals:               Exercise: continued/increased as per physician recommendations              Stress management: appropriate boundaries and accepting aid when needed              Social engagement: continued and increased especially w/ grandchildren/family, as per CDC COVID-19 guidelines              Therapy:  adaptive coping, CBT (cognitive restructuring), management of all or nothing thinking patterns, control in knowing limitations    Return to clinic: 3-4 weeks     Length of Service (minutes  direct face-to-face contact): 60    Diagnosis:     ICD-10-CM ICD-9-CM   1. Moderate episode of recurrent major depressive disorder  F33.1 296.32   2. Anxiety associated with cancer diagnosis  F41.1 300.09    C80.1    3. Malignant neoplasm of sigmoid colon  C18.7 153.3                Marky Paml Psy.D.  [place] License #[id number]  [place] License #[id number]

## 2023-04-06 ENCOUNTER — LAB VISIT (OUTPATIENT)
Dept: LAB | Facility: HOSPITAL | Age: 53
End: 2023-04-06
Attending: INTERNAL MEDICINE
Payer: MEDICAID

## 2023-04-06 ENCOUNTER — SPECIALTY PHARMACY (OUTPATIENT)
Dept: PHARMACY | Facility: CLINIC | Age: 53
End: 2023-04-06
Payer: MEDICAID

## 2023-04-06 ENCOUNTER — OFFICE VISIT (OUTPATIENT)
Dept: HEMATOLOGY/ONCOLOGY | Facility: CLINIC | Age: 53
End: 2023-04-06
Payer: MEDICAID

## 2023-04-06 VITALS
TEMPERATURE: 98 F | SYSTOLIC BLOOD PRESSURE: 126 MMHG | DIASTOLIC BLOOD PRESSURE: 76 MMHG | RESPIRATION RATE: 16 BRPM | OXYGEN SATURATION: 96 % | HEART RATE: 93 BPM | WEIGHT: 293 LBS | HEIGHT: 66 IN | BODY MASS INDEX: 47.09 KG/M2

## 2023-04-06 DIAGNOSIS — C18.7 MALIGNANT NEOPLASM OF SIGMOID COLON: ICD-10-CM

## 2023-04-06 DIAGNOSIS — C18.7 MALIGNANT NEOPLASM OF SIGMOID COLON: Primary | ICD-10-CM

## 2023-04-06 LAB
ALBUMIN SERPL BCP-MCNC: 3.8 G/DL (ref 3.5–5.2)
ALP SERPL-CCNC: 144 U/L (ref 55–135)
ALT SERPL W/O P-5'-P-CCNC: 82 U/L (ref 10–44)
ANION GAP SERPL CALC-SCNC: 9 MMOL/L (ref 8–16)
AST SERPL-CCNC: 50 U/L (ref 10–40)
BASOPHILS # BLD AUTO: 0.02 K/UL (ref 0–0.2)
BASOPHILS NFR BLD: 0.4 % (ref 0–1.9)
BILIRUB SERPL-MCNC: 0.4 MG/DL (ref 0.1–1)
BILIRUB UR QL STRIP: NEGATIVE
BUN SERPL-MCNC: 8 MG/DL (ref 6–20)
CALCIUM SERPL-MCNC: 10.5 MG/DL (ref 8.7–10.5)
CHLORIDE SERPL-SCNC: 108 MMOL/L (ref 95–110)
CLARITY UR: CLEAR
CO2 SERPL-SCNC: 22 MMOL/L (ref 23–29)
COLOR UR: YELLOW
CREAT SERPL-MCNC: 0.9 MG/DL (ref 0.5–1.4)
DIFFERENTIAL METHOD: ABNORMAL
EOSINOPHIL # BLD AUTO: 0.3 K/UL (ref 0–0.5)
EOSINOPHIL NFR BLD: 5.8 % (ref 0–8)
ERYTHROCYTE [DISTWIDTH] IN BLOOD BY AUTOMATED COUNT: 18.6 % (ref 11.5–14.5)
EST. GFR  (NO RACE VARIABLE): >60 ML/MIN/1.73 M^2
GLUCOSE SERPL-MCNC: 103 MG/DL (ref 70–110)
GLUCOSE UR QL STRIP: NEGATIVE
HCT VFR BLD AUTO: 43.5 % (ref 37–48.5)
HGB BLD-MCNC: 14.1 G/DL (ref 12–16)
HGB UR QL STRIP: NEGATIVE
IMM GRANULOCYTES # BLD AUTO: 0.02 K/UL (ref 0–0.04)
IMM GRANULOCYTES NFR BLD AUTO: 0.4 % (ref 0–0.5)
KETONES UR QL STRIP: NEGATIVE
LEUKOCYTE ESTERASE UR QL STRIP: NEGATIVE
LYMPHOCYTES # BLD AUTO: 1.4 K/UL (ref 1–4.8)
LYMPHOCYTES NFR BLD: 28 % (ref 18–48)
MCH RBC QN AUTO: 30.5 PG (ref 27–31)
MCHC RBC AUTO-ENTMCNC: 32.4 G/DL (ref 32–36)
MCV RBC AUTO: 94 FL (ref 82–98)
MONOCYTES # BLD AUTO: 0.5 K/UL (ref 0.3–1)
MONOCYTES NFR BLD: 10.8 % (ref 4–15)
NEUTROPHILS # BLD AUTO: 2.6 K/UL (ref 1.8–7.7)
NEUTROPHILS NFR BLD: 54.6 % (ref 38–73)
NITRITE UR QL STRIP: NEGATIVE
NRBC BLD-RTO: 0 /100 WBC
PH UR STRIP: 6 [PH] (ref 5–8)
PLATELET # BLD AUTO: 262 K/UL (ref 150–450)
PMV BLD AUTO: 9.9 FL (ref 9.2–12.9)
POTASSIUM SERPL-SCNC: 3.8 MMOL/L (ref 3.5–5.1)
PROT SERPL-MCNC: 7.1 G/DL (ref 6–8.4)
PROT UR QL STRIP: NEGATIVE
RBC # BLD AUTO: 4.63 M/UL (ref 4–5.4)
SODIUM SERPL-SCNC: 139 MMOL/L (ref 136–145)
SP GR UR STRIP: <=1.005 (ref 1–1.03)
URN SPEC COLLECT METH UR: ABNORMAL
WBC # BLD AUTO: 4.82 K/UL (ref 3.9–12.7)

## 2023-04-06 PROCEDURE — 4010F PR ACE/ARB THEARPY RXD/TAKEN: ICD-10-PCS | Mod: CPTII,,, | Performed by: INTERNAL MEDICINE

## 2023-04-06 PROCEDURE — 3074F PR MOST RECENT SYSTOLIC BLOOD PRESSURE < 130 MM HG: ICD-10-PCS | Mod: CPTII,,, | Performed by: INTERNAL MEDICINE

## 2023-04-06 PROCEDURE — 99999 PR PBB SHADOW E&M-EST. PATIENT-LVL IV: CPT | Mod: PBBFAC,,, | Performed by: INTERNAL MEDICINE

## 2023-04-06 PROCEDURE — 3008F PR BODY MASS INDEX (BMI) DOCUMENTED: ICD-10-PCS | Mod: CPTII,,, | Performed by: INTERNAL MEDICINE

## 2023-04-06 PROCEDURE — 99999 PR PBB SHADOW E&M-EST. PATIENT-LVL IV: ICD-10-PCS | Mod: PBBFAC,,, | Performed by: INTERNAL MEDICINE

## 2023-04-06 PROCEDURE — 3074F SYST BP LT 130 MM HG: CPT | Mod: CPTII,,, | Performed by: INTERNAL MEDICINE

## 2023-04-06 PROCEDURE — 81003 URINALYSIS AUTO W/O SCOPE: CPT | Mod: PN | Performed by: INTERNAL MEDICINE

## 2023-04-06 PROCEDURE — 36415 COLL VENOUS BLD VENIPUNCTURE: CPT | Mod: PN | Performed by: INTERNAL MEDICINE

## 2023-04-06 PROCEDURE — 3008F BODY MASS INDEX DOCD: CPT | Mod: CPTII,,, | Performed by: INTERNAL MEDICINE

## 2023-04-06 PROCEDURE — 4010F ACE/ARB THERAPY RXD/TAKEN: CPT | Mod: CPTII,,, | Performed by: INTERNAL MEDICINE

## 2023-04-06 PROCEDURE — 99215 OFFICE O/P EST HI 40 MIN: CPT | Mod: S$PBB,,, | Performed by: INTERNAL MEDICINE

## 2023-04-06 PROCEDURE — 99214 OFFICE O/P EST MOD 30 MIN: CPT | Mod: PBBFAC,PN | Performed by: INTERNAL MEDICINE

## 2023-04-06 PROCEDURE — 1159F MED LIST DOCD IN RCRD: CPT | Mod: CPTII,,, | Performed by: INTERNAL MEDICINE

## 2023-04-06 PROCEDURE — 1159F PR MEDICATION LIST DOCUMENTED IN MEDICAL RECORD: ICD-10-PCS | Mod: CPTII,,, | Performed by: INTERNAL MEDICINE

## 2023-04-06 PROCEDURE — 99215 PR OFFICE/OUTPT VISIT, EST, LEVL V, 40-54 MIN: ICD-10-PCS | Mod: S$PBB,,, | Performed by: INTERNAL MEDICINE

## 2023-04-06 PROCEDURE — 3078F PR MOST RECENT DIASTOLIC BLOOD PRESSURE < 80 MM HG: ICD-10-PCS | Mod: CPTII,,, | Performed by: INTERNAL MEDICINE

## 2023-04-06 PROCEDURE — 3078F DIAST BP <80 MM HG: CPT | Mod: CPTII,,, | Performed by: INTERNAL MEDICINE

## 2023-04-06 PROCEDURE — 85025 COMPLETE CBC W/AUTO DIFF WBC: CPT | Mod: PN | Performed by: INTERNAL MEDICINE

## 2023-04-06 PROCEDURE — 80053 COMPREHEN METABOLIC PANEL: CPT | Mod: PN | Performed by: INTERNAL MEDICINE

## 2023-04-06 RX ORDER — SODIUM CHLORIDE 9 MG/ML
1000 INJECTION, SOLUTION INTRAVENOUS
Status: CANCELLED | OUTPATIENT
Start: 2023-04-13

## 2023-04-06 RX ORDER — SODIUM CHLORIDE 0.9 % (FLUSH) 0.9 %
10 SYRINGE (ML) INJECTION
Status: CANCELLED | OUTPATIENT
Start: 2023-04-11

## 2023-04-06 RX ORDER — HEPARIN 100 UNIT/ML
500 SYRINGE INTRAVENOUS
Status: CANCELLED | OUTPATIENT
Start: 2023-04-13

## 2023-04-06 RX ORDER — LORAZEPAM 2 MG/ML
1 INJECTION INTRAMUSCULAR
Status: CANCELLED | OUTPATIENT
Start: 2023-04-11 | End: 2023-04-11

## 2023-04-06 RX ORDER — ATROPINE SULFATE 0.4 MG/ML
0.4 INJECTION, SOLUTION ENDOTRACHEAL; INTRAMEDULLARY; INTRAMUSCULAR; INTRAVENOUS; SUBCUTANEOUS ONCE AS NEEDED
Status: CANCELLED | OUTPATIENT
Start: 2023-04-11

## 2023-04-06 RX ORDER — SODIUM CHLORIDE 0.9 % (FLUSH) 0.9 %
10 SYRINGE (ML) INJECTION
Status: CANCELLED | OUTPATIENT
Start: 2023-04-13

## 2023-04-06 RX ORDER — HEPARIN 100 UNIT/ML
500 SYRINGE INTRAVENOUS
Status: CANCELLED | OUTPATIENT
Start: 2023-04-11

## 2023-04-06 NOTE — PROGRESS NOTES
PROGRESS NOTE    Subjective:       Patient ID: Gail Mckinnon is a 52 y.o. female.  MRN: [id number]  : 1970    Chief Complaint: Malignant neoplasm of sigmoid colon (Follow up with labs/)      History of Present Illness:   Gail Mckinnon is a 52 y.o. female who presents with colon cancer, initially stage III and now with LN recurrence.       She completed adjuvant FOLFOX in 2020. She tolerated only 4 cycles of FOLFOX before she developed an infusion reaction to oxaliplatin in cycle 5 was well as cycle 6. She then completed 6 cycles of infusional 5 FU.     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 She was offered second line therapy with FOLFIRI and Avastin.      She presented to the ED on  with left flank pain. CT renal stone study showed Moderate hydronephrosis on the left secondary to 3 mm calculus at the UPJ.    She had a PET scan mid April that showed possible progression of her disease with      She has been to [facility] for another opinion. No change was recommended in systemic therapy but she was offered surgical removal of the aorta caval nodes.  Recent sans at [facility]  show stable disease.      Surgery done 22. Received 2 more cycle of FOLFIRI without Avastin.     She had repeat imaging at [facility] on 22 that unfortunately showed disease progression since her surgery with multiple RP nodes and one mediastinal node.       PET 22  Impression:     1. Progression of disease with interval increase of abdominal and pelvic lymphadenopathy.  2. New area of moderate FDG uptake at the right half of the T9 vertebral body (image 124).  Favor degenerative disc changes.  No underlying osteolytic or osteoblastic lesion.  Recommend continued attention on follow-up.  3. No other evidence of FDG avid disease.     22  Impression After scan comparison:     1. Metastatic portacaval and left periaortic  retroperitoneal lymph nodes which remain stable between the CT of 09/24/2022 and the subsequent PET-CT of 12/08/2022.  There is no evidence of progression of metastatic disease.  2. Nonobstructing bilateral renal calculi and cholelithiasis.    2/10/22:  CT chest abd pelvis  Impression:     Stable periportal, retroperitoneal and mesenteric lymphadenopathy compared to PET-CT 12/08/2022.     Stable right middle lobe 3 mm pulmonary nodule.  No new or enlarging pulmonary nodule.     Hepatic steatosis.  Hepatosplenomegaly.     Cholelithiasis.     Bilateral nonobstructing nephrolithiasis.     Small hiatal hernia with retained contrast in the distal esophagus.  Correlate for reflux.    She had virtual visit [facility] on 2/17/23.     She has continued FOLFIRI and Avastin.     Interim history:  Has been switched to xeloda due to 5 FU shortage since her last visit. Does not tolerate Xeloda well due hand foot syndrome, blistering of feet, did not take the last day of Xeloda. Completed 4/1.     Nausea improves with compazine.      Weight and appetite is stable.     She is following up with Dr. Palm.  Started Prozac recently and feels it may be helping.         Oncology History:  Oncology History   Malignant neoplasm of sigmoid colon   3/16/2020 Initial Diagnosis    Malignant neoplasm of sigmoid colon       3/31/2020 Cancer Staged    Staging form: Colon and Rectum, AJCC 8th Edition  - Clinical stage from 3/31/2020: Stage IIIC (cT4b, cN2a, cM0)       5/6/2020 - 11/17/2020 Chemotherapy    Treatment Summary   Plan Name: OP FOLFOX 6 Q2W  Treatment Goal: Curative  Status: Inactive  Start Date: 5/6/2020  End Date: 11/6/2020  Provider: Dylan Leyva MD  Chemotherapy: fluorouraciL injection 945 mg, 400 mg/m2 = 945 mg, Intravenous, Clinic/HOD 1 time, 14 of 14 cycles  Administration: 945 mg (5/6/2020), 945 mg (5/20/2020), 945 mg (6/3/2020), 945 mg (6/17/2020), 945 mg (7/29/2020), 945 mg (8/12/2020), 945 mg (8/26/2020), 945 mg (9/9/2020),  945 mg (9/23/2020), 945 mg (10/6/2020), 945 mg (10/21/2020), 945 mg (11/4/2020)  fluorouraciL 2,400 mg/m2 = 5,665 mg in sodium chloride 0.9% 240 mL chemo infusion, 2,400 mg/m2 = 5,665 mg, Intravenous, Over 46 hours, 14 of 14 cycles  Administration: 5,665 mg (5/6/2020), 5,665 mg (5/20/2020), 5,665 mg (6/3/2020), 5,665 mg (6/17/2020), 5,665 mg (7/29/2020), 5,665 mg (8/12/2020), 5,665 mg (8/26/2020), 5,665 mg (9/9/2020), 5,665 mg (9/23/2020), 5,665 mg (10/6/2020), 5,665 mg (10/21/2020), 5,665 mg (11/4/2020)  leucovorin calcium 900 mg in dextrose 5 % 250 mL infusion, 945 mg, Intravenous, Clinic/HOD 1 time, 14 of 14 cycles  Administration: 900 mg (5/6/2020), 900 mg (5/20/2020), 900 mg (6/3/2020), 900 mg (6/17/2020), 945 mg (6/30/2020), 945 mg (7/20/2020), 945 mg (7/29/2020), 945 mg (8/12/2020), 945 mg (8/26/2020), 945 mg (9/9/2020), 945 mg (9/23/2020), 945 mg (10/6/2020), 945 mg (10/21/2020), 945 mg (11/4/2020)  oxaliplatin (ELOXATIN) 200 mg in dextrose 5 % 500 mL chemo infusion, 201 mg, Intravenous, Clinic/HOD 1 time, 6 of 6 cycles  Dose modification: 65 mg/m2 (original dose 85 mg/m2, Cycle 6)  Administration: 200 mg (5/6/2020), 200 mg (5/20/2020), 200 mg (6/3/2020), 200 mg (6/17/2020), 201 mg (6/30/2020), 150 mg (7/20/2020)       6/16/2021 -  Chemotherapy    Treatment Summary   Plan Name: OP COLORECTAL FOLFIRI + BEVACIZUMAB Q2W  Treatment Goal: Control  Status: Active  Start Date: 6/16/2021  End Date: 5/11/2023 (Planned)  Provider: Marah Santo MD  Chemotherapy: fluorouraciL injection 990 mg, 400 mg/m2 = 990 mg, Intravenous, Clinic/HOD 1 time, 15 of 15 cycles  Administration: 990 mg (6/16/2021), 990 mg (6/30/2021), 990 mg (7/14/2021), 980 mg (7/28/2021), 990 mg (8/11/2021), 990 mg (8/25/2021), 990 mg (9/8/2021), 990 mg (9/22/2021), 990 mg (10/6/2021), 990 mg (10/20/2021), 990 mg (11/3/2021), 990 mg (12/1/2021), 990 mg (12/15/2021), 990 mg (11/17/2021), 990 mg (12/28/2021)  fluorouraciL 2,400 mg/m2 = 5,950 mg in  sodium chloride 0.9% 240 mL chemo infusion, 2,400 mg/m2 = 5,950 mg, Intravenous, Over 46 hours, 36 of 39 cycles  Administration: 5,950 mg (6/16/2021), 5,950 mg (6/30/2021), 5,950 mg (7/14/2021), 5,880 mg (7/28/2021), 5,930 mg (8/11/2021), 5,930 mg (8/25/2021), 5,930 mg (9/8/2021), 5,930 mg (9/22/2021), 5,930 mg (10/6/2021), 5,930 mg (10/20/2021), 5,930 mg (11/3/2021), 5,930 mg (12/1/2021), 5,930 mg (12/15/2021), 5,930 mg (11/17/2021), 5,930 mg (12/28/2021), 6,050 mg (5/11/2022), 6,025 mg (3/9/2022), 6,025 mg (2/23/2022), 5,930 mg (2/2/2022), 5,930 mg (1/19/2022), 6,050 mg (4/20/2022), 6,025 mg (4/6/2022), 6,025 mg (3/23/2022), 6,050 mg (6/1/2022), 6,050 mg (6/15/2022), 6,050 mg (10/19/2022), 6,050 mg (10/5/2022), 6,050 mg (11/2/2022), 6,050 mg (11/16/2022), 6,050 mg (11/30/2022), 6,050 mg (1/4/2023), 6,050 mg (12/19/2022), 6,050 mg (1/18/2023), 6,000 mg (2/28/2023), 6,050 mg (2/14/2023), 6,000 mg (2/1/2023)  bevacizumab (AVASTIN) 600 mg in sodium chloride 0.9% 100 mL chemo infusion, 660 mg, Intravenous, [facility] 1 time, 36 of 36 cycles  Dose modification: 5 mg/kg (original dose 5 mg/kg, Cycle 26, Reason: MD Discretion, Comment: 5 mg/kg original dose)  Administration: 600 mg (6/30/2021), 600 mg (7/14/2021), 600 mg (7/28/2021), 600 mg (6/16/2021), 600 mg (8/11/2021), 655 mg (8/25/2021), 600 mg (9/8/2021), 600 mg (9/22/2021), 600 mg (10/6/2021), 600 mg (10/20/2021), 600 mg (11/3/2021), 655 mg (12/1/2021), 600 mg (12/15/2021), 600 mg (11/17/2021), 600 mg (12/28/2021), 600 mg (5/11/2022), 600 mg (3/9/2022), 600 mg (2/23/2022), 600 mg (2/2/2022), 600 mg (1/19/2022), 600 mg (4/20/2022), 680 mg (4/6/2022), 600 mg (3/23/2022), 680 mg (10/5/2022), 680 mg (10/19/2022), 680 mg (11/2/2022), 680 mg (11/16/2022), 680 mg (11/30/2022), 680 mg (1/4/2023), 680 mg (12/19/2022), 680 mg (1/18/2023), 680 mg (3/28/2023), 680 mg (3/14/2023), 680 mg (2/28/2023), 680 mg (2/14/2023), 680 mg (2/1/2023)  irinotecan (CAMPTOSAR) 440 mg in sodium  chloride 0.9% 500 mL chemo infusion, 446 mg, Intravenous, Clinic/HOD 1 time, 38 of 41 cycles  Dose modification: 180 mg/m2 (original dose 180 mg/m2, Cycle 24)  Administration: 440 mg (6/16/2021), 440 mg (6/30/2021), 440 mg (7/14/2021), 440 mg (7/28/2021), 440 mg (8/11/2021), 444 mg (8/25/2021), 440 mg (9/8/2021), 440 mg (9/22/2021), 440 mg (10/6/2021), 440 mg (10/20/2021), 440 mg (11/3/2021), 440 mg (12/1/2021), 440 mg (12/15/2021), 440 mg (11/17/2021), 440 mg (12/28/2021), 440 mg (5/11/2022), 440 mg (3/9/2022), 440 mg (2/23/2022), 440 mg (2/2/2022), 440 mg (1/19/2022), 440 mg (4/20/2022), 440 mg (4/6/2022), 440 mg (3/24/2022), 440 mg (6/1/2022), 440 mg (6/15/2022), 440 mg (10/19/2022), 440 mg (10/5/2022), 440 mg (11/2/2022), 440 mg (11/16/2022), 440 mg (11/30/2022), 440 mg (1/4/2023), 440 mg (12/19/2022), 440 mg (1/18/2023), 440 mg (3/28/2023), 440 mg (3/14/2023), 440 mg (2/28/2023), 440 mg (2/14/2023), 440 mg (2/1/2023)  bevacizumab-awwb (MVASI) 5 mg/kg = 680 mg in sodium chloride 0.9% 100 mL infusion, 5 mg/kg = 680 mg (original dose ), Intravenous, Clinic/HOD 1 time, 0 of 3 cycles  Dose modification: 5 mg/kg (Cycle 39)       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   4/3/2020 Initial Diagnosis    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       5/6/2020 - 11/17/2020 Chemotherapy    Treatment Summary   Plan Name: OP FOLFOX 6 Q2W  Treatment Goal: Curative  Status: Inactive  Start Date: 5/6/2020  End Date: 11/6/2020  Provider: Dylan Leyva MD  Chemotherapy: fluorouraciL injection 945 mg, 400 mg/m2 = 945 mg, Intravenous, Clinic/HOD 1 time, 14 of 14 cycles  Administration: 945 mg (5/6/2020), 945 mg (5/20/2020), 945 mg (6/3/2020), 945 mg (6/17/2020), 945 mg (7/29/2020), 945 mg (8/12/2020), 945 mg (8/26/2020), 945 mg (9/9/2020), 945 mg (9/23/2020), 945 mg (10/6/2020), 945 mg (10/21/2020), 945 mg (11/4/2020)  fluorouraciL 2,400 mg/m2 = 5,665 mg in sodium chloride 0.9% 240 mL chemo infusion, 2,400 mg/m2 = 5,665 mg, Intravenous,  Over 46 hours, 14 of 14 cycles  Administration: 5,665 mg (5/6/2020), 5,665 mg (5/20/2020), 5,665 mg (6/3/2020), 5,665 mg (6/17/2020), 5,665 mg (7/29/2020), 5,665 mg (8/12/2020), 5,665 mg (8/26/2020), 5,665 mg (9/9/2020), 5,665 mg (9/23/2020), 5,665 mg (10/6/2020), 5,665 mg (10/21/2020), 5,665 mg (11/4/2020)  leucovorin calcium 900 mg in dextrose 5 % 250 mL infusion, 945 mg, Intravenous, Clinic/HOD 1 time, 14 of 14 cycles  Administration: 900 mg (5/6/2020), 900 mg (5/20/2020), 900 mg (6/3/2020), 900 mg (6/17/2020), 945 mg (6/30/2020), 945 mg (7/20/2020), 945 mg (7/29/2020), 945 mg (8/12/2020), 945 mg (8/26/2020), 945 mg (9/9/2020), 945 mg (9/23/2020), 945 mg (10/6/2020), 945 mg (10/21/2020), 945 mg (11/4/2020)  oxaliplatin (ELOXATIN) 200 mg in dextrose 5 % 500 mL chemo infusion, 201 mg, Intravenous, Clinic/HOD 1 time, 6 of 6 cycles  Dose modification: 65 mg/m2 (original dose 85 mg/m2, Cycle 6)  Administration: 200 mg (5/6/2020), 200 mg (5/20/2020), 200 mg (6/3/2020), 200 mg (6/17/2020), 201 mg (6/30/2020), 150 mg (7/20/2020)       6/16/2021 -  Chemotherapy    Treatment Summary   Plan Name: OP COLORECTAL FOLFIRI + BEVACIZUMAB Q2W  Treatment Goal: Control  Status: Active  Start Date: 6/16/2021  End Date: 5/11/2023 (Planned)  Provider: Marah Santo MD  Chemotherapy: fluorouraciL injection 990 mg, 400 mg/m2 = 990 mg, Intravenous, [facility] 1 time, 15 of 15 cycles  Administration: 990 mg (6/16/2021), 990 mg (6/30/2021), 990 mg (7/14/2021), 980 mg (7/28/2021), 990 mg (8/11/2021), 990 mg (8/25/2021), 990 mg (9/8/2021), 990 mg (9/22/2021), 990 mg (10/6/2021), 990 mg (10/20/2021), 990 mg (11/3/2021), 990 mg (12/1/2021), 990 mg (12/15/2021), 990 mg (11/17/2021), 990 mg (12/28/2021)  fluorouraciL 2,400 mg/m2 = 5,950 mg in sodium chloride 0.9% 240 mL chemo infusion, 2,400 mg/m2 = 5,950 mg, Intravenous, Over 46 hours, 36 of 39 cycles  Administration: 5,950 mg (6/16/2021), 5,950 mg (6/30/2021), 5,950 mg (7/14/2021), 5,880 mg  (7/28/2021), 5,930 mg (8/11/2021), 5,930 mg (8/25/2021), 5,930 mg (9/8/2021), 5,930 mg (9/22/2021), 5,930 mg (10/6/2021), 5,930 mg (10/20/2021), 5,930 mg (11/3/2021), 5,930 mg (12/1/2021), 5,930 mg (12/15/2021), 5,930 mg (11/17/2021), 5,930 mg (12/28/2021), 6,050 mg (5/11/2022), 6,025 mg (3/9/2022), 6,025 mg (2/23/2022), 5,930 mg (2/2/2022), 5,930 mg (1/19/2022), 6,050 mg (4/20/2022), 6,025 mg (4/6/2022), 6,025 mg (3/23/2022), 6,050 mg (6/1/2022), 6,050 mg (6/15/2022), 6,050 mg (10/19/2022), 6,050 mg (10/5/2022), 6,050 mg (11/2/2022), 6,050 mg (11/16/2022), 6,050 mg (11/30/2022), 6,050 mg (1/4/2023), 6,050 mg (12/19/2022), 6,050 mg (1/18/2023), 6,000 mg (2/28/2023), 6,050 mg (2/14/2023), 6,000 mg (2/1/2023)  bevacizumab (AVASTIN) 600 mg in sodium chloride 0.9% 100 mL chemo infusion, 660 mg, Intravenous, Clinic/hospitals 1 time, 36 of 36 cycles  Dose modification: 5 mg/kg (original dose 5 mg/kg, Cycle 26, Reason: MD Discretion, Comment: 5 mg/kg original dose)  Administration: 600 mg (6/30/2021), 600 mg (7/14/2021), 600 mg (7/28/2021), 600 mg (6/16/2021), 600 mg (8/11/2021), 655 mg (8/25/2021), 600 mg (9/8/2021), 600 mg (9/22/2021), 600 mg (10/6/2021), 600 mg (10/20/2021), 600 mg (11/3/2021), 655 mg (12/1/2021), 600 mg (12/15/2021), 600 mg (11/17/2021), 600 mg (12/28/2021), 600 mg (5/11/2022), 600 mg (3/9/2022), 600 mg (2/23/2022), 600 mg (2/2/2022), 600 mg (1/19/2022), 600 mg (4/20/2022), 680 mg (4/6/2022), 600 mg (3/23/2022), 680 mg (10/5/2022), 680 mg (10/19/2022), 680 mg (11/2/2022), 680 mg (11/16/2022), 680 mg (11/30/2022), 680 mg (1/4/2023), 680 mg (12/19/2022), 680 mg (1/18/2023), 680 mg (3/28/2023), 680 mg (3/14/2023), 680 mg (2/28/2023), 680 mg (2/14/2023), 680 mg (2/1/2023)  irinotecan (CAMPTOSAR) 440 mg in sodium chloride 0.9% 500 mL chemo infusion, 446 mg, Intravenous, Clinic/hospitals 1 time, 38 of 41 cycles  Dose modification: 180 mg/m2 (original dose 180 mg/m2, Cycle 24)  Administration: 440 mg (6/16/2021), 440 mg  (2021), 440 mg (2021), 440 mg (2021), 440 mg (2021), 444 mg (2021), 440 mg (2021), 440 mg (2021), 440 mg (10/6/2021), 440 mg (10/20/2021), 440 mg (11/3/2021), 440 mg (2021), 440 mg (12/15/2021), 440 mg (2021), 440 mg (2021), 440 mg (2022), 440 mg (3/9/2022), 440 mg (2022), 440 mg (2022), 440 mg (2022), 440 mg (2022), 440 mg (2022), 440 mg (3/24/2022), 440 mg (2022), 440 mg (6/15/2022), 440 mg (10/19/2022), 440 mg (10/5/2022), 440 mg (2022), 440 mg (2022), 440 mg (2022), 440 mg (2023), 440 mg (2022), 440 mg (2023), 440 mg (3/28/2023), 440 mg (3/14/2023), 440 mg (2023), 440 mg (2023), 440 mg (2023)  bevacizumab-awwb (MVASI) 5 mg/kg = 680 mg in sodium chloride 0.9% 100 mL infusion, 5 mg/kg = 680 mg (original dose ), Intravenous, Clinic/HOD 1 time, 0 of 3 cycles  Dose modification: 5 mg/kg (Cycle 39)           History:  Past Medical History:   Diagnosis Date    Anxiety     Depression     FH: ovarian cancer 3/16/2020    Hx of psychiatric care     Effexor, Paxil, Lexapro, Zoloft, Wellbutrin, Trazodone Buspar    Hyperthyroidism     Hypothyroid     Kidney calculi     Malignant neoplasm of sigmoid colon 3/16/2020    Menorrhagia     Multinodular goiter 2012    Palpitation     Psychiatric problem     Venous insufficiency       Past Surgical History:   Procedure Laterality Date     SECTION, CLASSIC      x3    COLONOSCOPY N/A 2020    Procedure: COLONOSCOPY;  Surgeon: Shane Parker MD;  Location: [facility];  Service: Endoscopy;  Laterality: N/A;    COLONOSCOPY N/A 2022    Procedure: COLONOSCOPY;  Surgeon: Shane Parker MD;  Location: [facility];  Service: Endoscopy;  Laterality: N/A;    CYSTOSCOPY W/ URETERAL STENT PLACEMENT Bilateral 3/25/2020    Procedure: CYSTOSCOPY, WITH URETERAL STENT INSERTION;  Surgeon: Claudio Tyson MD;  Location: [facility] OR [address] [place] [address];  Service:  Urology;  Laterality: Bilateral;    INSERTION OF TUNNELED CENTRAL VENOUS CATHETER (CVC) WITH SUBCUTANEOUS PORT N/A 5/1/2020    Procedure: [id number];  Surgeon: Stephen Diagnostic Provider;  Location: [facility] OR 2ND FLR;  Service: Radiology;  Laterality: N/A;  Room 189/Cindy    LAPAROSCOPIC SIGMOIDECTOMY N/A 3/25/2020    Procedure: COLECTOMY, SIGMOID, LAPAROSCOPIC, flex sig, ERAS high;  Surgeon: Silvio Man MD;  Location: [facility] OR 2ND FLR;  Service: Colon and Rectal;  Laterality: N/A;    MOBILIZATION OF SPLENIC FLEXURE  3/25/2020    Procedure: MOBILIZATION, SPLENIC FLEXURE;  Surgeon: Silvio Man MD;  Location: [facility] OR 2ND FLR;  Service: Colon and Rectal;;    tonsillectomy      TOTAL ABDOMINAL HYSTERECTOMY W/ BILATERAL SALPINGOOPHORECTOMY N/A 3/25/2020    Procedure: HYSTERECTOMY, TOTAL, ABDOMINAL, WITH BILATERAL SALPINGO-OOPHORECTOMY;  Surgeon: Keron Brady MD;  Location: [facility] OR 2ND FLR;  Service: Oncology;  Laterality: N/A;     Family History   Problem Relation Age of Onset    Heart disease Father     Diabetes Mother 65    Drug abuse Brother     Drug abuse Brother     Cancer Maternal Aunt         lung cancer    Cancer Maternal Grandmother         stomach cancer- started in ovaries    Ovarian cancer Maternal Grandmother         stomach cancer- started in ovaries    Colon cancer Paternal Uncle     Cancer Paternal Uncle     Ovarian cancer Maternal Aunt     Ovarian cancer Maternal Aunt     Ovarian cancer Cousin         mother had ovarian cancer    Drug abuse Son     Drug abuse Son         clean/sober since 2012    Colon cancer Son     No Known Problems Daughter     Uterine cancer Neg Hx     Breast cancer Neg Hx      Social History     Tobacco Use    Smoking status: Never    Smokeless tobacco: Never   Substance and Sexual Activity    Alcohol use: Yes     Comment: occasionally- twice monthly    Drug use: Never    Sexual activity: Yes     Partners: Male     Birth control/protection: See  "Surgical Hx        ROS:   Review of Systems   Constitutional:  Positive for malaise/fatigue (first few days). Negative for fever and weight loss.   HENT:  Negative for congestion, hearing loss, nosebleeds and sore throat.    Eyes:  Negative for double vision and photophobia.   Respiratory:  Negative for cough, hemoptysis, sputum production, shortness of breath and wheezing.    Cardiovascular:  Negative for chest pain, palpitations, orthopnea and leg swelling.   Gastrointestinal:  Positive for nausea (improving). Negative for abdominal pain, blood in stool, constipation, diarrhea, heartburn and vomiting.   Genitourinary:  Negative for dysuria, frequency, hematuria and urgency.   Musculoskeletal:  Negative for back pain, joint pain and myalgias.   Skin:  Positive for rash (facial, sun sensitive). Negative for itching.   Neurological:  Negative for dizziness, tingling, seizures, weakness and headaches.   Endo/Heme/Allergies:  Negative for polydipsia. Does not bruise/bleed easily.   Psychiatric/Behavioral:  Negative for depression and memory loss. The patient is nervous/anxious. The patient does not have insomnia.       Objective:     Vitals:    04/06/23 1536   BP: 126/76   Pulse: 93   Resp: 16   Temp: 97.6 °F (36.4 °C)   TempSrc: Temporal   SpO2: 96%   Weight: 133.3 kg (293 lb 14 oz)   Height: 5' 6" (1.676 m)   PainSc:   4   PainLoc: Foot       Wt Readings from Last 10 Encounters:   04/06/23 133.3 kg (293 lb 14 oz)   03/30/23 131.8 kg (290 lb 9.1 oz)   03/28/23 132.8 kg (292 lb 12.3 oz)   03/27/23 126 kg (277 lb 12.5 oz)   03/14/23 134.5 kg (296 lb 8.3 oz)   03/13/23 134.5 kg (296 lb 8.3 oz)   02/28/23 133 kg (293 lb 3.4 oz)   02/27/23 133 kg (293 lb 3.4 oz)   02/16/23 (!) 137.2 kg (302 lb 7.5 oz)   02/14/23 (!) 137.2 kg (302 lb 7.5 oz)       Physical Examination:   Physical Exam  Vitals and nursing note reviewed.   Constitutional:       General: She is not in acute distress.     Appearance: She is not diaphoretic. "   HENT:      Head: Normocephalic.      Mouth/Throat:      Pharynx: No oropharyngeal exudate.   Eyes:      General: No scleral icterus.     Conjunctiva/sclera: Conjunctivae normal.   Neck:      Thyroid: No thyromegaly.   Cardiovascular:      Rate and Rhythm: Normal rate and regular rhythm.      Heart sounds: Normal heart sounds. No murmur heard.  Pulmonary:      Effort: Pulmonary effort is normal. No respiratory distress.      Breath sounds: No stridor. No wheezing or rales.   Chest:      Chest wall: No tenderness.   Abdominal:      General: Bowel sounds are normal. There is no distension.      Palpations: Abdomen is soft. There is no mass.      Tenderness: There is no abdominal tenderness. There is no rebound.   Musculoskeletal:         General: No tenderness or deformity. Normal range of motion.      Cervical back: Neck supple.   Lymphadenopathy:      Cervical: No cervical adenopathy.   Skin:     General: Skin is warm and dry.      Findings: No erythema or rash.   Neurological:      Mental Status: She is alert and oriented to person, place, and time.      Cranial Nerves: No cranial nerve deficit.      Coordination: Coordination normal.      Gait: Gait is intact.   Psychiatric:         Mood and Affect: Affect normal.         Cognition and Memory: Memory normal.         Judgment: Judgment normal.        Diagnostic Tests:  Significant Imaging: I have reviewed and interpreted all pertinent imaging results/findings.  Laboratory Data:  All pertinent labs have been reviewed.  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 Oxaliplatin was stopped due to severe adverse reaction.     Follow-up CTs and PET in May 2021 showed enlarging retroperitoneal node, concerning for metastatic disease.      She started FOLFIRI + Avastin and has continued till July 2022.   Given isolated RP toni disease, she has undergone open Left periaortic and aortocaval lymph node dissection on 7/12/2022.     Follow up scans showed disease progression. I have discussed the case with Dr. Montelongo at [facility]. We discussed resuming FOLFIRI-debo vs transitioning to BRAF directed therapy (BEACON).   There may be an option ton enroll in SWOG 2107 (encorafenib/cetuximab +/- nivo) at [facility] if no prior BRAF inhibitor exposure.[facility] is working with the patient's insurance to see if she can be enrolled there.      Patient is interested in the trial and therefore we resumed FOLFIRI- Debo. 2 follow up scans show stable disease and we will continue. [facility] follow up note reviewed. No other recommendations at this time.       If she has progressive disease and unable to got to [facility] and trial not available locally, will treat with next line treatment with encorafenib and cetuximab.     Tolerated Xeloda poorly, will switch back to FOLFIRI-debo as 5 FU becomes available. Cleared to receive treatment on 4/10.     Nausea   Continue compazine.     Mucositis   Continue [facility] soln.     Transaminitis  Fluctuates.  Continue to monitor. No obvious liver mets.     Hypercalcemia   Mild, secondary to hyperparathyroidism.  Avoid calcium supplements.     Hypothyroidism  Multinodular goiter   Continue Levothyroxine. Endocrine consult is appreciated.   Had a non diagnostic biopsy in 2012, usg findings have remained stable. However, given uptake on PET this  year, an FNA vs repeat usg in 6 months is recommended. Scheduled for next week.     Essential HTN   Continue antihypertensives.      Anxiety   Continue to  follow up with Dr. Palm.     MDM includes  :    - Acute or chronic illness or injury that poses a threat to life or bodily function  - Independent review and explanation of 3+ results from unique tests  - Discussion of management and ordering 3+ unique tests  - Extensive discussion of treatment and management  - Prescription drug management  - Drug therapy requiring intensive monitoring for toxicity          ECOG SCORE               Discussion:   No follow-ups on file.    Plan was discussed with the patient at length, and she verbalized understanding. Gail was given an opportunity to ask questions that were answered to her satisfaction, and she was advised to call in the interval if any problems or questions arise.    Electronically signed by Marah Santo MD        Route Chart for Scheduling    Med Onc Chart Routing      Follow up with physician . Appts scheduled   Follow up with T[facility]    Infusion scheduling note    Injection scheduling note    Labs    Imaging    Pharmacy appointment    Other referrals           Treatment Plan Information   OP COLORECTAL FOLFIRI + BEVACIZUMAB Q2W   Marah Santo MD   Upcoming Treatment Dates - OP COLORECTAL FOLFIRI + BEVACIZUMAB Q2W    4/11/2023       Pre-Medications       LORazepam injection 1 mg       promethazine (PHENERGAN) 6.25 mg in dextrose 5 % (D5W) 100 mL IVPB       palonosetron 0.25mg/dexAMETHasone 20mg in NS IVPB       Chemotherapy       irinotecan (CAMPTOSAR) 440 mg in sodium chloride 0.9% 522 mL chemo infusion       leucovorin calcium 400 mg/m2 = 1,010 mg in dextrose 5 % (D5W) 250 mL infusion       fluorouracil (ADRUCIL) 2,400 mg/m2 = 6,050 mg in sodium chloride 0.9% 240 mL chemo infusion       bevacizumab-awwb (MVASI) 5 mg/kg = 680 mg in sodium chloride 0.9% 100 mL infusion       Supportive Care        atropine injection 0.4 mg  4/25/2023       Pre-Medications       LORazepam injection 1 mg       promethazine (PHENERGAN) 6.25 mg in dextrose 5 % 100 mL IVPB       palonosetron 0.25mg/dexAMETHasone 20mg in NS IVPB       Chemotherapy       irinotecan (CAMPTOSAR) 440 mg in sodium chloride 0.9% 522 mL chemo infusion       leucovorin calcium 400 mg/m2 = 1,010 mg in dextrose 5 % 250 mL infusion       fluorouracil (ADRUCIL) 2,400 mg/m2 = 6,050 mg in sodium chloride 0.9% 240 mL chemo infusion       bevacizumab-awwb (MVASI) 5 mg/kg = 680 mg in sodium chloride 0.9% 100 mL infusion       Supportive Care       atropine injection 0.4 mg  5/9/2023       Pre-Medications       LORazepam injection 1 mg       promethazine (PHENERGAN) 6.25 mg in dextrose 5 % 100 mL IVPB       palonosetron 0.25mg/dexAMETHasone 20mg in NS IVPB       Chemotherapy       irinotecan (CAMPTOSAR) 440 mg in sodium chloride 0.9% 522 mL chemo infusion       leucovorin calcium 400 mg/m2 = 1,010 mg in dextrose 5 % 250 mL infusion       fluorouracil (ADRUCIL) 2,400 mg/m2 = 6,050 mg in sodium chloride 0.9% 240 mL chemo infusion       bevacizumab-awwb (MVASI) 5 mg/kg = 680 mg in sodium chloride 0.9% 100 mL infusion       Supportive Care       atropine injection 0.4 mg    Supportive Plan Information  IV FLUIDS AND ELECTROLYTES   Brayden Solo MD   Upcoming Treatment Dates - IV FLUIDS AND ELECTROLYTES    No upcoming days in selected categories.    Therapy Plan Information  PORT FLUSH  Flushes  heparin, porcine (PF) 100 unit/mL injection flush 500 Units  500 Units, Intravenous, Every visit  sodium chloride 0.9% flush 10 mL  10 mL, Intravenous, Every visit

## 2023-04-06 NOTE — TELEPHONE ENCOUNTER
Spoke with patient regarding capecitabine medication refill. Patient states that Dr. Santo told her that she is switching to 5-FU, as they have enough supply for her next cycle. I have no MD note to confirm this, however. Therefore, I have reached out to Dr. Santo for confirmation. Awaiting response via secure chat.

## 2023-04-11 ENCOUNTER — INFUSION (OUTPATIENT)
Dept: INFUSION THERAPY | Facility: HOSPITAL | Age: 53
End: 2023-04-11
Attending: INTERNAL MEDICINE
Payer: MEDICAID

## 2023-04-11 ENCOUNTER — DOCUMENTATION ONLY (OUTPATIENT)
Dept: INFUSION THERAPY | Facility: HOSPITAL | Age: 53
End: 2023-04-11

## 2023-04-11 VITALS
BODY MASS INDEX: 47.09 KG/M2 | WEIGHT: 293 LBS | HEIGHT: 66 IN | SYSTOLIC BLOOD PRESSURE: 117 MMHG | RESPIRATION RATE: 18 BRPM | DIASTOLIC BLOOD PRESSURE: 78 MMHG | TEMPERATURE: 98 F | HEART RATE: 92 BPM

## 2023-04-11 DIAGNOSIS — C77.2 METASTASIS TO INTESTINAL LYMPH NODE: Primary | ICD-10-CM

## 2023-04-11 DIAGNOSIS — C18.7 MALIGNANT NEOPLASM OF SIGMOID COLON: ICD-10-CM

## 2023-04-11 PROCEDURE — 96416 CHEMO PROLONG INFUSE W/PUMP: CPT | Mod: PN

## 2023-04-11 PROCEDURE — 96417 CHEMO IV INFUS EACH ADDL SEQ: CPT | Mod: PN

## 2023-04-11 PROCEDURE — A4216 STERILE WATER/SALINE, 10 ML: HCPCS | Mod: PN | Performed by: INTERNAL MEDICINE

## 2023-04-11 PROCEDURE — 96375 TX/PRO/DX INJ NEW DRUG ADDON: CPT | Mod: PN

## 2023-04-11 PROCEDURE — 63600175 PHARM REV CODE 636 W HCPCS: Mod: JZ,JG,PN | Performed by: INTERNAL MEDICINE

## 2023-04-11 PROCEDURE — 96413 CHEMO IV INFUSION 1 HR: CPT | Mod: PN

## 2023-04-11 PROCEDURE — 96367 TX/PROPH/DG ADDL SEQ IV INF: CPT | Mod: PN

## 2023-04-11 PROCEDURE — 25000003 PHARM REV CODE 250: Mod: PN | Performed by: INTERNAL MEDICINE

## 2023-04-11 RX ORDER — LORAZEPAM 2 MG/ML
1 INJECTION INTRAMUSCULAR
Status: COMPLETED | OUTPATIENT
Start: 2023-04-11 | End: 2023-04-11

## 2023-04-11 RX ORDER — ATROPINE SULFATE 0.4 MG/ML
0.4 INJECTION, SOLUTION ENDOTRACHEAL; INTRAMEDULLARY; INTRAMUSCULAR; INTRAVENOUS; SUBCUTANEOUS ONCE AS NEEDED
Status: COMPLETED | OUTPATIENT
Start: 2023-04-11 | End: 2023-04-11

## 2023-04-11 RX ORDER — SODIUM CHLORIDE 0.9 % (FLUSH) 0.9 %
10 SYRINGE (ML) INJECTION
Status: DISCONTINUED | OUTPATIENT
Start: 2023-04-11 | End: 2023-04-11 | Stop reason: HOSPADM

## 2023-04-11 RX ADMIN — BEVACIZUMAB-AWWB 680 MG: 400 INJECTION, SOLUTION INTRAVENOUS at 11:04

## 2023-04-11 RX ADMIN — FLUOROURACIL 6000 MG: 50 INJECTION, SOLUTION INTRAVENOUS at 02:04

## 2023-04-11 RX ADMIN — SODIUM CHLORIDE 440 MG: 9 INJECTION, SOLUTION INTRAVENOUS at 12:04

## 2023-04-11 RX ADMIN — LORAZEPAM 1 MG: 2 INJECTION INTRAMUSCULAR; INTRAVENOUS at 11:04

## 2023-04-11 RX ADMIN — Medication 10 ML: at 02:04

## 2023-04-11 RX ADMIN — PALONOSETRON 0.25 MG: 0.05 INJECTION, SOLUTION INTRAVENOUS at 10:04

## 2023-04-11 RX ADMIN — ATROPINE SULFATE 0.4 MG: 0.4 INJECTION, SOLUTION INTRAVENOUS at 12:04

## 2023-04-11 RX ADMIN — PROMETHAZINE HYDROCHLORIDE 6.25 MG: 25 INJECTION INTRAMUSCULAR; INTRAVENOUS at 10:04

## 2023-04-11 RX ADMIN — LEUCOVORIN CALCIUM 1000 MG: 350 INJECTION, POWDER, LYOPHILIZED, FOR SOLUTION INTRAMUSCULAR; INTRAVENOUS at 12:04

## 2023-04-11 RX ADMIN — SODIUM CHLORIDE: 9 INJECTION, SOLUTION INTRAVENOUS at 10:04

## 2023-04-11 NOTE — PROGRESS NOTES
Oncology Nutrition   Gail Mckinnon   1970    Therapeutic Food Pantry     Pt eligible for [employer] . Order filled out with patient at chairside. All patient questions or concerns regarding  and/or nutrition status addressed. RD to continue to monitor prn and provide patient food while under active treatment.     Food order filled by this RD- will provide to patient at end of infusion today.    Ciera Richardson, DAVIDN, LDN  04/11/2023  3:41 PM

## 2023-04-11 NOTE — PROGRESS NOTES
Oncology   Chemotherapy Infusion Visit    Quick Social Service Status Follow Up   Met w/ pt briefly to follow up on social and emotional needs. SW met with pt briefly at chairside. She was in good spirits today. She said things have been going fairly will at her new apartment. The management has been making some repairs but over all it is good. She is getting food from [facility] today. Pt denied any needs from SW this visit.     Radha Giordano, MEDINA  04/11/2023  4:10 PM

## 2023-04-11 NOTE — PLAN OF CARE
Pt arrived to clinic for Avastin,Folfiri and Xeloda treatments and tolerated well with no changes throughout therapy. Pt educated on line care at home and aware of number to call for any pump issues. Pt with chemo spill kit and aware of how to use if needed. Pt aware of side effects of meds and aware of f/u appts and discharged to home in NAD with  with 5FU pump infusing with ease.

## 2023-04-13 ENCOUNTER — INFUSION (OUTPATIENT)
Dept: INFUSION THERAPY | Facility: HOSPITAL | Age: 53
End: 2023-04-13
Attending: INTERNAL MEDICINE
Payer: MEDICAID

## 2023-04-13 VITALS
TEMPERATURE: 98 F | BODY MASS INDEX: 47.09 KG/M2 | SYSTOLIC BLOOD PRESSURE: 115 MMHG | DIASTOLIC BLOOD PRESSURE: 78 MMHG | WEIGHT: 293 LBS | RESPIRATION RATE: 18 BRPM | HEART RATE: 67 BPM | HEIGHT: 66 IN

## 2023-04-13 DIAGNOSIS — C18.7 MALIGNANT NEOPLASM OF SIGMOID COLON: ICD-10-CM

## 2023-04-13 DIAGNOSIS — C77.2 METASTASIS TO INTESTINAL LYMPH NODE: Primary | ICD-10-CM

## 2023-04-13 PROCEDURE — 25000003 PHARM REV CODE 250: Mod: PN | Performed by: INTERNAL MEDICINE

## 2023-04-13 PROCEDURE — 96360 HYDRATION IV INFUSION INIT: CPT | Mod: PN

## 2023-04-13 PROCEDURE — 96361 HYDRATE IV INFUSION ADD-ON: CPT | Mod: PN

## 2023-04-13 PROCEDURE — 63600175 PHARM REV CODE 636 W HCPCS: Mod: PN | Performed by: INTERNAL MEDICINE

## 2023-04-13 RX ORDER — HEPARIN 100 UNIT/ML
500 SYRINGE INTRAVENOUS
Status: DISCONTINUED | OUTPATIENT
Start: 2023-04-13 | End: 2023-04-13 | Stop reason: HOSPADM

## 2023-04-13 RX ORDER — SODIUM CHLORIDE 9 MG/ML
1000 INJECTION, SOLUTION INTRAVENOUS
Status: DISCONTINUED | OUTPATIENT
Start: 2023-04-13 | End: 2023-04-13 | Stop reason: HOSPADM

## 2023-04-13 RX ADMIN — Medication 500 UNITS: at 12:04

## 2023-04-13 RX ADMIN — SODIUM CHLORIDE 1000 ML: 9 INJECTION, SOLUTION INTRAVENOUS at 10:04

## 2023-04-13 NOTE — PLAN OF CARE
Pt arrived to clinic today for IVFluids infusion and pump d/c and tolerated well. No changes throughout therapy. Pt aware of follow up appointments and side effects of drugs. Discharged to home. NAD.

## 2023-04-16 ENCOUNTER — PATIENT MESSAGE (OUTPATIENT)
Dept: HEMATOLOGY/ONCOLOGY | Facility: CLINIC | Age: 53
End: 2023-04-16
Payer: MEDICAID

## 2023-04-18 ENCOUNTER — PATIENT MESSAGE (OUTPATIENT)
Dept: HEMATOLOGY/ONCOLOGY | Facility: CLINIC | Age: 53
End: 2023-04-18
Payer: MEDICAID

## 2023-04-20 ENCOUNTER — TELEPHONE (OUTPATIENT)
Dept: HEMATOLOGY/ONCOLOGY | Facility: CLINIC | Age: 53
End: 2023-04-20
Payer: MEDICAID

## 2023-04-20 NOTE — TELEPHONE ENCOUNTER
Spoke with the pt and the pt was already rescheduled. Pt verbalized and understanding.  ----- Message from Nataly Pedro sent at 4/20/2023 10:49 AM CDT -----  Type:  Patient Returning Call    Who Called:  Patient  Who Left Message for Patient:  Annmarie  Does the patient know what this is regarding?:  yes  Best Call Back Number: [phone]   Additional Information:  Patient returning missed call

## 2023-04-20 NOTE — TELEPHONE ENCOUNTER
----- Message from Nataly Pedro sent at 4/20/2023 10:49 AM CDT -----  Type:  Patient Returning Call    Who Called:  Patient  Who Left Message for Patient:  Annmarie  Does the patient know what this is regarding?:  yes  Best Call Back Number: [phone]   Additional Information:  Patient returning missed call

## 2023-04-24 ENCOUNTER — PATIENT MESSAGE (OUTPATIENT)
Dept: HEMATOLOGY/ONCOLOGY | Facility: CLINIC | Age: 53
End: 2023-04-24
Payer: MEDICAID

## 2023-04-24 ENCOUNTER — TELEPHONE (OUTPATIENT)
Dept: HEMATOLOGY/ONCOLOGY | Facility: CLINIC | Age: 53
End: 2023-04-24
Payer: MEDICAID

## 2023-04-24 NOTE — TELEPHONE ENCOUNTER
Spoke with the pt and the pt stated that she was already scheduled. Pt verbalized and understanding.  ----- Message from Gisell Cleveland RN sent at 4/24/2023  1:51 PM CDT -----    ----- Message -----  From: Terrence Cisneros  Sent: 4/24/2023  10:48 AM CDT  To: Hansa Crystal ([facility]) Staff    Type: Need Medical Advice   Who Called: Patient   Best callback number: [phone]  Additional Information: Patient would like to reschedule for Wed 26th before infusion  Please call to further assist, Thanks

## 2023-04-25 ENCOUNTER — PATIENT MESSAGE (OUTPATIENT)
Dept: HEMATOLOGY/ONCOLOGY | Facility: CLINIC | Age: 53
End: 2023-04-25
Payer: MEDICAID

## 2023-04-25 NOTE — PROGRESS NOTES
PATIENT: Gail Mckinnon  MRN: [id number]  DATE: 4/26/2023      Diagnosis:   1. Malignant neoplasm of sigmoid colon    2. 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 Nausea    4. Diarrhea, unspecified type    5. Mucositis due to chemotherapy    6. Transaminitis    7. Hypercalcemia    8. Hypertension, unspecified type    9. Anxiety          Chief Complaint: Malignant neoplasm of sigmoid colon      Subjective:   HPI: Ms. Mckinnon is a 53 y.o. female  patient known to Dr. Santo for diagnosis of colon cancer, initially stage III and now with LN recurrence.      She completed adjuvant FOLFOX in November 2020. She tolerated only 4 cycles of FOLFOX before she developed an infusion reaction to oxaliplatin in cycle 5 was well as cycle 6. She then completed 6 cycles of infusional 5 FU.     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 She is now on second line therapy with FOLFIRI and Avastin     4/18/22 PET scan mid April that showed possible progression of her disease with enlarging lymphadenopahty from 1.7cm to 1.9 cm, no new lesion.     For now, we have continued FOLFIRI+ Avastin with plan to repeat short term imaging. She has been to [facility] for another opinion. No change was recommended in systemic therapy but she was called back to discuss surgical options.      She saw surgical oncology on 5/28 and they recommend surgical removal of the aorta caval nodes.  Recent sans at [facility]  show stable disease.      Surgery done 7/12/22. Received 2 more cycle of FOLFIRI without Avastin.      She had repeat imaging at [facility] on 9/24/22 that unfortunately showed disease progression since her surgery with multiple RP nodes and one mediastinal node.     PET 12/8/22  Impression:     1. Progression of disease with interval increase of abdominal and pelvic lymphadenopathy.  2. New area of moderate FDG uptake at the right half of the T9 vertebral body (image 124).  Favor degenerative disc changes.  No underlying  osteolytic or osteoblastic lesion.  Recommend continued attention on follow-up.  3. No other evidence of FDG avid disease.     22  Impression After scan comparison:     1. Metastatic portacaval and left periaortic retroperitoneal lymph nodes which remain stable between the CT of 2022 and the subsequent PET-CT of 2022.  There is no evidence of progression of metastatic disease.  2. Nonobstructing bilateral renal calculi and cholelithiasis.    She had virtual visit at [facility] on 23.     She has continued FOLFIRI and Avastin.     Interim:    Patient is here today in consideration of her next cycle of FOLFIRI/Avastin.  Had been getting Xeloda due to shortage of 5FU but did not tolerate this well. Was switched back to 5FU on 23.    Lingering dry, cracked skin on bilateral hands but is improving.   Diarrhea with last chemo cycle x 4 days, now improved. She has been experiencing nausea and not drinking much over the past few days.   Has noticed more postnasal drip and sinus congestion over the past week,  is taking OTC medications. No fevers.   Right sided back pain that is intermittent, mostly positional at night. Has been there for several days, does not radiate.  Mood is improved overall, is requesting refill of Lorazepam today.     Denies HA, CP, cough, SOB, abd pain, dysuria, swelling, fevers, chills, new lumps or bumps.     Past Medical History:   Past Medical History:   Diagnosis Date    Anxiety     Depression     FH: ovarian cancer 3/16/2020    Hx of psychiatric care     Effexor, Paxil, Lexapro, Zoloft, Wellbutrin, Trazodone Buspar    Hyperthyroidism     Hypothyroid     Kidney calculi     Malignant neoplasm of sigmoid colon 3/16/2020    Menorrhagia     Multinodular goiter 2012    Palpitation     Psychiatric problem     Venous insufficiency        Past Surgical HIstory:   Past Surgical History:   Procedure Laterality Date     SECTION, CLASSIC      x3    COLONOSCOPY N/A 2020     Procedure: COLONOSCOPY;  Surgeon: Shane Parker MD;  Location: [facility] ENDO;  Service: Endoscopy;  Laterality: N/A;    COLONOSCOPY N/A 6/21/2022    Procedure: COLONOSCOPY;  Surgeon: Shane Parker MD;  Location: [facility] ENDO;  Service: Endoscopy;  Laterality: N/A;    CYSTOSCOPY W/ URETERAL STENT PLACEMENT Bilateral 3/25/2020    Procedure: CYSTOSCOPY, WITH URETERAL STENT INSERTION;  Surgeon: Claudio Tyson MD;  [facility];  Service: Urology;  Laterality: Bilateral;    INSERTION OF TUNNELED CENTRAL VENOUS CATHETER (CVC) WITH SUBCUTANEOUS PORT N/A 5/1/2020    Procedure: [id number];  Surgeon: Dosc Diagnostic Provider;  [facility];  Service: Radiology;  Laterality: N/A;  Room 189/[facility]    LAPAROSCOPIC SIGMOIDECTOMY N/A 3/25/2020    Procedure: COLECTOMY, SIGMOID, LAPAROSCOPIC, flex sig, ERAS high;  Surgeon: Silvio Man MD;  [facility];  Service: Colon and Rectal;  Laterality: N/A;    MOBILIZATION OF SPLENIC FLEXURE  3/25/2020    Procedure: MOBILIZATION, SPLENIC FLEXURE;  Surgeon: Silvio Man MD;  [facility];  Service: Colon and Rectal;;    tonsillectomy      TOTAL ABDOMINAL HYSTERECTOMY W/ BILATERAL SALPINGOOPHORECTOMY N/A 3/25/2020    Procedure: HYSTERECTOMY, TOTAL, ABDOMINAL, WITH BILATERAL SALPINGO-OOPHORECTOMY;  Surgeon: Keron Brady MD;  [facility];  Service: Oncology;  Laterality: N/A;       Family History:   Family History   Problem Relation Age of Onset    Heart disease Father     Diabetes Mother 65    Drug abuse Brother     Drug abuse Brother     Cancer Maternal Aunt         lung cancer    Cancer Maternal Grandmother         stomach cancer- started in ovaries    Ovarian cancer Maternal Grandmother         stomach cancer- started in ovaries    Colon cancer Paternal Uncle     Cancer Paternal Uncle     Ovarian cancer Maternal Aunt     Ovarian cancer Maternal Aunt     Ovarian cancer Cousin          mother had ovarian cancer    Drug abuse Son     Drug abuse Son         clean/sober since 2012    Colon cancer Son     No Known Problems Daughter     Uterine cancer Neg Hx     Breast cancer Neg Hx        Social History:  reports that she has never smoked. She has never used smokeless tobacco. She reports current alcohol use. She reports that she does not use drugs.    Allergies:  Review of patient's allergies indicates:   Allergen Reactions    Oxaliplatin Other (See Comments)     Itchy hands, throat closed up, trouble hearing - during infusion    Penicillins Hives and Rash     childhood allergy  childhood allergy  unknown       Medications:  Current Outpatient Medications   Medication Sig Dispense Refill    acetaminophen (TYLENOL) 500 MG tablet Take 2 tablets (1,000 mg total) by mouth every 8 (eight) hours. 60 tablet 0    benzonatate (TESSALON PERLES) 100 MG capsule Take 1 capsule (100 mg total) by mouth every 6 (six) hours as needed for Cough. 30 capsule 1    buPROPion (WELLBUTRIN SR) 150 MG TBSR 12 hr tablet Take 1 tablet (150 mg total) by mouth 2 (two) times daily. 180 tablet 0    busPIRone (BUSPAR) 10 MG tablet Take 1 tablet (10 mg total) by mouth 2 (two) times daily. 180 tablet 0    FLUoxetine (PROZAC) 20 MG capsule Take 1 capsule (20 mg total) by mouth once daily. 30 capsule 1    granisetron (SANCUSO) 3.1 mg/24 hour Place 1 patch (3.1 mg total) onto the skin every 7 days AS DIRECTED. 4 patch 3    Lactobacillus rhamnosus GG (CULTURELLE) 10 billion cell capsule Take 1 capsule by mouth once daily.      lactulose (CHRONULAC) 10 gram/15 mL solution Take 30 mLs (20 g total) by mouth daily as needed (constipation). 500 mL 2    levothyroxine (SYNTHROID) 200 MCG tablet Take 1 tablet (200 mcg total) by mouth once daily. 90 tablet 3    LIDOcaine-prilocaine (EMLA) cream Apply topically as needed (30 to 60 min prior chemo or port use). 30 g 3    magic mouthwash diphen/antac/lidoc/nysta Swish and swallow 5 mL every 4 hours  as needed for mouth pain/ulcers 120 mL 2    multivitamin (THERAGRAN) per tablet Take 1 tablet by mouth once daily.      olmesartan (BENICAR) 20 MG tablet Take 1 tablet (20 mg total) by mouth once daily. 30 tablet 5    ondansetron (ZOFRAN-ODT) 8 MG TbDL Take 1 tablet (8 mg total) by mouth every 8 (eight) hours as needed. 60 tablet 3    prochlorperazine (COMPAZINE) 10 MG tablet Take 1 tablet (10 mg total) by mouth every 6 (six) hours as needed. 30 tablet 6    promethazine (PHENERGAN) 12.5 MG Tab Take 1 to 2 tablets by mouth every 6 hours as needed for nausea persistent despite zofran 40 tablet 3    senna-docusate 8.6-50 mg (PERICOLACE) 8.6-50 mg per tablet Take 2 tablets by mouth 2 (two) times daily.      traMADoL (ULTRAM) 50 mg tablet Take 1 tablet (50 mg total) by mouth 3 (three) times daily as needed for Pain. 90 tablet 0    traZODone (DESYREL) 50 MG tablet Take 1 tablet (50 mg total) by mouth nightly. 30 tablet 2    LORazepam (ATIVAN) 1 MG tablet Take 1 tablet (1 mg total) by mouth every 12 (twelve) hours as needed for Anxiety (nausea). 30 tablet 0     No current facility-administered medications for this visit.     Facility-Administered Medications Ordered in Other Visits   Medication Dose Route Frequency Provider Last Rate Last Admin    atropine injection 0.4 mg  0.4 mg Intravenous Once PRN Ophelia Moreno NP        bevacizumab-awwb (MVASI) 5 mg/kg = 680 mg in sodium chloride 0.9% 100 mL infusion  5 mg/kg (Treatment Plan Recorded) Intravenous 1 time in Clinic/HOD Ophelia Moreno NP        fluorouracil (ADRUCIL) 2,400 mg/m2 = 6,050 mg in sodium chloride 0.9% 240 mL chemo infusion  2,400 mg/m2 (Treatment Plan Recorded) Intravenous over 46 hr Ophelia Moreno NP        heparin, porcine (PF) 100 unit/mL injection flush 500 Units  500 Units Intravenous PRN Ophelia Moreno NP        irinotecan (CAMPTOSAR) 440 mg in sodium chloride 0.9% 522 mL chemo infusion  440 mg Intravenous 1 time in Clinic/HOD Ophelia Moreno NP         "leucovorin calcium 400 mg/m2 = 1,010 mg in dextrose 5 % (D5W) 250 mL infusion  400 mg/m2 (Treatment Plan Recorded) Intravenous 1 time in Clinic[facility]        LORazepam injection 1 mg  1 mg Intravenous 1 time in [facility], LUISA        palonosetron 0.25mg/dexAMETHasone 20mg in NS IVPB   Intravenous 1 time in [facility], LUISA        promethazine (PHENERGAN) 6.25 mg in dextrose 5 % (D5W) 100 mL IVPB  6.25 mg Intravenous 1 time in Clinic/[facility], LUISA        sodium chloride 0.9% 250 mL flush bag   Intravenous 1 time in [facility], LUISA        sodium chloride 0.9% bolus 1,000 mL 1,000 mL  1,000 mL Intravenous PRN [facility]           Review of Systems   Constitutional:  Negative for appetite change and unexpected weight change.   HENT:  Negative for mouth sores.    Eyes:  Negative for visual disturbance.   Respiratory:  Negative for cough and shortness of breath.    Cardiovascular:  Negative for chest pain.   Gastrointestinal:  Positive for diarrhea. Negative for abdominal pain.   Genitourinary:  Negative for frequency.   Musculoskeletal:  Negative for back pain.   Skin:  Negative for rash.   Neurological:  Negative for headaches.   Hematological:  Negative for adenopathy.   Psychiatric/Behavioral:  The patient is not nervous/anxious.      ECOG Performance Status:   ECOG SCORE    1 - Restricted in strenuous activity-ambulatory and able to carry out work of a light nature         Objective:      Vitals:   Vitals:    04/26/23 0845   BP: (!) 132/92   Pulse: 97   Resp: 16   Temp: 97.1 °F (36.2 °C)   TempSrc: Temporal   SpO2: 97%   Weight: 133.8 kg (294 lb 15.6 oz)   Height: 5' 6" (1.676 m)       BMI: Body mass index is 47.61 kg/m².    Physical Exam  Vitals reviewed.   Constitutional:       General: She is not in acute distress.     Appearance: She is not diaphoretic.   HENT:      Head: Normocephalic and atraumatic.   Eyes:      General: No scleral " icterus.  Cardiovascular:      Rate and Rhythm: Normal rate and regular rhythm.      Heart sounds: Normal heart sounds. No murmur heard.  Pulmonary:      Effort: No respiratory distress.      Breath sounds: Normal breath sounds.   Abdominal:      General: Bowel sounds are normal. There is no distension.   Musculoskeletal:      Right lower leg: No edema.      Left lower leg: No edema.   Neurological:      Mental Status: She is alert and oriented to person, place, and time.      Gait: Gait normal.   Psychiatric:         Mood and Affect: Mood normal.         Behavior: Behavior normal.       Laboratory Data:  Lab Results   Component Value Date    WBC 4.19 04/26/2023    HGB 14.9 04/26/2023    HCT 46.3 04/26/2023    MCV 96 04/26/2023     04/26/2023        Assessment:       1. Malignant neoplasm of sigmoid colon    2. 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 Nausea    4. Diarrhea, unspecified type    5. Mucositis due to chemotherapy    6. Transaminitis    7. Hypercalcemia    8. Hypertension, unspecified type    9. 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 Oxaliplatin was stopped due to severe adverse reaction.  -Follow-up CTs and PET in May 2021 showed enlarging retroperitoneal node, concerning for metastatic disease.   -She started FOLFIRI + Avastin and has continued till July 2022.   -Given isolated RP toni disease, she has undergone open Left periaortic and aortocaval lymph node dissection on 7/12/2022.   -Due to progression on imaging, Dr. Santo discussed case with Dr. Montelongo at [facility]. Discussed resuming FOLFIRI-lloyd vs transitioning to BRAF directed therapy (BEACON). There may be an option to enroll in SWOG  2107 (encorafenib/cetuximab +/- nivo) at [facility] if no prior BRAF inhibitor exposure.[facility] is working with the patient's insurance to see if she can be enrolled there.    -Patient is interested in the trial and therefore we resumed FOLFIRI- Debo. 2 follow up scans show stable disease and we will continue. [facility] follow up note reviewed. No other recommendations at this time.   -Being treated with Xeloda due to  5FU shortage, tolerating poorly. Decision was made to switch back to FOLFIRI-debo as 5FU becomes available  -Proceed with FOLFIRI/Avastin today, add fluids to infusion  -Will get CT C/A/P prior to next appointment with Dr. Santo   -Keep appointment with Dr. Santo on 5/8/23      Nausea  -Continue PO Ondansetron, Compazine PRN  -Will monitor     Diarrhea   -Has improved, take Imodium PRN   -will monitor     Mucositis   -Continue [facility] solution PRN  -Monitor     Transaminitis  -Fluctuates, will monitor  -No obvious liver mets.      Hypercalcemia   -Mild, secondary to hyperparathyroidism.    -Avoid calcium supplements.   -Monitor     Hypothyroidism  Multinodular goiter   -Continue Levothyroxine. Endocrine consult is appreciated.  -Had a non diagnostic biopsy in 2012, usg findings have remained stable. However, given uptake on PET this year, an FNA vs repeat usg in 6 months is recommended.      Essential HTN   -Continue antihypertensives  -B/P today 132/92, ok to treat  -Monitor     Anxiety   -Refill of Lorazepam sent today  -Continue to  follow up with Dr. Palm and Dr. Pino    Patient queried and all questions were answered.  Assessment/Plan reviewed and approved by Dr. Santo   30 minutes were spent in coordination of patient's care, record review and counseling.    Route Chart for Scheduling    Med Onc Chart Routing      Follow up with physician Other. As scheduled with Dr. Santo on 5/8/23; needs CT C/A/P before the appointment with Dr. Santo on 5/8   Follow up with TAVO    Infusion scheduling note Proceed with  FOLFIRI/Avastin today, add IVF today. Pump D/C and IVF 4/28   Injection scheduling note    Labs    Imaging CT chest abdomen pelvis   Needs CT scan before appointment with Dr. Santo on 5/8/23   Pharmacy appointment    Other referrals           Treatment Plan Information   OP COLORECTAL FOLFIRI + BEVACIZUMAB Q2W   Marah Santo MD   Upcoming Treatment Dates - OP COLORECTAL FOLFIRI + BEVACIZUMAB Q2W    5/10/2023       Pre-Medications       LORazepam injection 1 mg       promethazine (PHENERGAN) 6.25 mg in dextrose 5 % 100 mL IVPB       palonosetron 0.25mg/dexAMETHasone 20mg in NS IVPB       Chemotherapy       irinotecan (CAMPTOSAR) 440 mg in sodium chloride 0.9% 522 mL chemo infusion       leucovorin calcium 400 mg/m2 = 1,010 mg in dextrose 5 % 250 mL infusion       fluorouracil (ADRUCIL) 2,400 mg/m2 = 6,050 mg in sodium chloride 0.9% 240 mL chemo infusion       bevacizumab-awwb (MVASI) 5 mg/kg = 680 mg in sodium chloride 0.9% 100 mL infusion       Supportive Care       atropine injection 0.4 mg    Supportive Plan Information  IV FLUIDS AND ELECTROLYTES   Brayden Solo MD   Upcoming Treatment Dates - IV FLUIDS AND ELECTROLYTES    No upcoming days in selected categories.    Therapy Plan Information  PORT FLUSH  Flushes  heparin, porcine (PF) 100 unit/mL injection flush 500 Units  500 Units, Intravenous, Every visit  sodium chloride 0.9% flush 10 mL  10 mL, Intravenous, Every visit

## 2023-04-26 ENCOUNTER — OFFICE VISIT (OUTPATIENT)
Dept: HEMATOLOGY/ONCOLOGY | Facility: CLINIC | Age: 53
End: 2023-04-26
Payer: MEDICAID

## 2023-04-26 ENCOUNTER — INFUSION (OUTPATIENT)
Dept: INFUSION THERAPY | Facility: HOSPITAL | Age: 53
End: 2023-04-26
Attending: INTERNAL MEDICINE
Payer: MEDICAID

## 2023-04-26 ENCOUNTER — PATIENT MESSAGE (OUTPATIENT)
Dept: HEMATOLOGY/ONCOLOGY | Facility: CLINIC | Age: 53
End: 2023-04-26

## 2023-04-26 VITALS
SYSTOLIC BLOOD PRESSURE: 132 MMHG | TEMPERATURE: 97 F | DIASTOLIC BLOOD PRESSURE: 92 MMHG | BODY MASS INDEX: 47.09 KG/M2 | HEIGHT: 66 IN | WEIGHT: 293 LBS | HEART RATE: 97 BPM | RESPIRATION RATE: 16 BRPM | OXYGEN SATURATION: 97 %

## 2023-04-26 VITALS
TEMPERATURE: 97 F | BODY MASS INDEX: 47.09 KG/M2 | RESPIRATION RATE: 16 BRPM | HEART RATE: 97 BPM | SYSTOLIC BLOOD PRESSURE: 132 MMHG | DIASTOLIC BLOOD PRESSURE: 92 MMHG | WEIGHT: 293 LBS | HEIGHT: 66 IN | OXYGEN SATURATION: 97 %

## 2023-04-26 DIAGNOSIS — R19.7 DIARRHEA, UNSPECIFIED TYPE: ICD-10-CM

## 2023-04-26 DIAGNOSIS — F41.9 ANXIETY: ICD-10-CM

## 2023-04-26 DIAGNOSIS — C77.2 METASTASIS TO INTESTINAL LYMPH NODE: Primary | ICD-10-CM

## 2023-04-26 DIAGNOSIS — I10 HYPERTENSION, UNSPECIFIED TYPE: ICD-10-CM

## 2023-04-26 DIAGNOSIS — K12.31 MUCOSITIS DUE TO CHEMOTHERAPY: ICD-10-CM

## 2023-04-26 DIAGNOSIS — R74.01 TRANSAMINITIS: ICD-10-CM

## 2023-04-26 DIAGNOSIS — C18.7 MALIGNANT NEOPLASM OF SIGMOID COLON: Primary | ICD-10-CM

## 2023-04-26 DIAGNOSIS — R11.0 NAUSEA: ICD-10-CM

## 2023-04-26 DIAGNOSIS — C77.2 METASTASIS TO INTESTINAL LYMPH NODE: ICD-10-CM

## 2023-04-26 DIAGNOSIS — C18.7 MALIGNANT NEOPLASM OF SIGMOID COLON: ICD-10-CM

## 2023-04-26 DIAGNOSIS — E83.52 HYPERCALCEMIA: ICD-10-CM

## 2023-04-26 PROCEDURE — 99214 OFFICE O/P EST MOD 30 MIN: CPT | Mod: S$PBB,,,

## 2023-04-26 PROCEDURE — 4010F PR ACE/ARB THEARPY RXD/TAKEN: ICD-10-PCS | Mod: CPTII,,,

## 2023-04-26 PROCEDURE — 3075F PR MOST RECENT SYSTOLIC BLOOD PRESS GE 130-139MM HG: ICD-10-PCS | Mod: CPTII,,,

## 2023-04-26 PROCEDURE — 99999 PR PBB SHADOW E&M-EST. PATIENT-LVL V: CPT | Mod: PBBFAC,,,

## 2023-04-26 PROCEDURE — 3075F SYST BP GE 130 - 139MM HG: CPT | Mod: CPTII,,,

## 2023-04-26 PROCEDURE — 99214 PR OFFICE/OUTPT VISIT, EST, LEVL IV, 30-39 MIN: ICD-10-PCS | Mod: S$PBB,,,

## 2023-04-26 PROCEDURE — 96375 TX/PRO/DX INJ NEW DRUG ADDON: CPT | Mod: PN

## 2023-04-26 PROCEDURE — 3008F PR BODY MASS INDEX (BMI) DOCUMENTED: ICD-10-PCS | Mod: CPTII,,,

## 2023-04-26 PROCEDURE — 3080F PR MOST RECENT DIASTOLIC BLOOD PRESSURE >= 90 MM HG: ICD-10-PCS | Mod: CPTII,,,

## 2023-04-26 PROCEDURE — 96416 CHEMO PROLONG INFUSE W/PUMP: CPT | Mod: PN

## 2023-04-26 PROCEDURE — 96367 TX/PROPH/DG ADDL SEQ IV INF: CPT | Mod: PN

## 2023-04-26 PROCEDURE — 4010F ACE/ARB THERAPY RXD/TAKEN: CPT | Mod: CPTII,,,

## 2023-04-26 PROCEDURE — 99999 PR PBB SHADOW E&M-EST. PATIENT-LVL V: ICD-10-PCS | Mod: PBBFAC,,,

## 2023-04-26 PROCEDURE — 96368 THER/DIAG CONCURRENT INF: CPT | Mod: PN

## 2023-04-26 PROCEDURE — 3008F BODY MASS INDEX DOCD: CPT | Mod: CPTII,,,

## 2023-04-26 PROCEDURE — 25000003 PHARM REV CODE 250: Mod: PN

## 2023-04-26 PROCEDURE — 3080F DIAST BP >= 90 MM HG: CPT | Mod: CPTII,,,

## 2023-04-26 PROCEDURE — 96417 CHEMO IV INFUS EACH ADDL SEQ: CPT | Mod: PN

## 2023-04-26 PROCEDURE — 63600175 PHARM REV CODE 636 W HCPCS: Mod: PN

## 2023-04-26 PROCEDURE — 99215 OFFICE O/P EST HI 40 MIN: CPT | Mod: PBBFAC,PN,25

## 2023-04-26 PROCEDURE — 96413 CHEMO IV INFUSION 1 HR: CPT | Mod: PN

## 2023-04-26 RX ORDER — LORAZEPAM 2 MG/ML
1 INJECTION INTRAMUSCULAR
Status: CANCELLED | OUTPATIENT
Start: 2023-04-26 | End: 2023-04-26

## 2023-04-26 RX ORDER — HEPARIN 100 UNIT/ML
500 SYRINGE INTRAVENOUS
Status: CANCELLED | OUTPATIENT
Start: 2023-04-28

## 2023-04-26 RX ORDER — LORAZEPAM 1 MG/1
1 TABLET ORAL EVERY 12 HOURS PRN
Qty: 30 TABLET | Refills: 0 | Status: SHIPPED | OUTPATIENT
Start: 2023-04-26 | End: 2023-06-05 | Stop reason: SDUPTHER

## 2023-04-26 RX ORDER — HEPARIN 100 UNIT/ML
500 SYRINGE INTRAVENOUS
Status: DISCONTINUED | OUTPATIENT
Start: 2023-04-26 | End: 2023-04-26 | Stop reason: HOSPADM

## 2023-04-26 RX ORDER — ATROPINE SULFATE 0.4 MG/ML
0.4 INJECTION, SOLUTION ENDOTRACHEAL; INTRAMEDULLARY; INTRAMUSCULAR; INTRAVENOUS; SUBCUTANEOUS ONCE AS NEEDED
Status: COMPLETED | OUTPATIENT
Start: 2023-04-26 | End: 2023-04-26

## 2023-04-26 RX ORDER — SODIUM CHLORIDE 0.9 % (FLUSH) 0.9 %
10 SYRINGE (ML) INJECTION
Status: CANCELLED | OUTPATIENT
Start: 2023-04-26

## 2023-04-26 RX ORDER — ATROPINE SULFATE 0.4 MG/ML
0.4 INJECTION, SOLUTION ENDOTRACHEAL; INTRAMEDULLARY; INTRAMUSCULAR; INTRAVENOUS; SUBCUTANEOUS ONCE AS NEEDED
Status: CANCELLED | OUTPATIENT
Start: 2023-04-26

## 2023-04-26 RX ORDER — SODIUM CHLORIDE 9 MG/ML
1000 INJECTION, SOLUTION INTRAVENOUS
Status: CANCELLED | OUTPATIENT
Start: 2023-04-28

## 2023-04-26 RX ORDER — SODIUM CHLORIDE 0.9 % (FLUSH) 0.9 %
10 SYRINGE (ML) INJECTION
Status: CANCELLED | OUTPATIENT
Start: 2023-04-28

## 2023-04-26 RX ORDER — LORAZEPAM 2 MG/ML
1 INJECTION INTRAMUSCULAR
Status: COMPLETED | OUTPATIENT
Start: 2023-04-26 | End: 2023-04-26

## 2023-04-26 RX ORDER — HEPARIN 100 UNIT/ML
500 SYRINGE INTRAVENOUS
Status: CANCELLED | OUTPATIENT
Start: 2023-04-26

## 2023-04-26 RX ADMIN — BEVACIZUMAB-AWWB 680 MG: 400 INJECTION, SOLUTION INTRAVENOUS at 11:04

## 2023-04-26 RX ADMIN — SODIUM CHLORIDE: 9 INJECTION, SOLUTION INTRAVENOUS at 09:04

## 2023-04-26 RX ADMIN — FLUOROURACIL 6000 MG: 50 INJECTION, SOLUTION INTRAVENOUS at 01:04

## 2023-04-26 RX ADMIN — PROMETHAZINE HYDROCHLORIDE 6.25 MG: 25 INJECTION INTRAMUSCULAR; INTRAVENOUS at 10:04

## 2023-04-26 RX ADMIN — LEUCOVORIN CALCIUM 1010 MG: 350 INJECTION, POWDER, LYOPHILIZED, FOR SOLUTION INTRAMUSCULAR; INTRAVENOUS at 11:04

## 2023-04-26 RX ADMIN — SODIUM CHLORIDE 1000 ML: 9 INJECTION, SOLUTION INTRAVENOUS at 09:04

## 2023-04-26 RX ADMIN — PALONOSETRON: 0.05 INJECTION, SOLUTION INTRAVENOUS at 10:04

## 2023-04-26 RX ADMIN — ATROPINE SULFATE 0.4 MG: 0.4 INJECTION, SOLUTION INTRAVENOUS at 11:04

## 2023-04-26 RX ADMIN — SODIUM CHLORIDE 440 MG: 9 INJECTION, SOLUTION INTRAVENOUS at 11:04

## 2023-04-26 RX ADMIN — LORAZEPAM 1 MG: 2 INJECTION INTRAMUSCULAR; INTRAVENOUS at 09:04

## 2023-04-26 NOTE — PLAN OF CARE
Pt arrived to clinic today for Avastin/Folfiri infusion and tolerated well. No changes throughout therapy. Pt aware of follow up appointments and side effects of drugs. Discharged to home. NAD.

## 2023-04-28 ENCOUNTER — INFUSION (OUTPATIENT)
Dept: INFUSION THERAPY | Facility: HOSPITAL | Age: 53
End: 2023-04-28
Attending: INTERNAL MEDICINE
Payer: MEDICAID

## 2023-04-28 ENCOUNTER — TELEPHONE (OUTPATIENT)
Dept: INFUSION THERAPY | Facility: HOSPITAL | Age: 53
End: 2023-04-28
Payer: MEDICAID

## 2023-04-28 ENCOUNTER — DOCUMENTATION ONLY (OUTPATIENT)
Dept: INFUSION THERAPY | Facility: HOSPITAL | Age: 53
End: 2023-04-28
Payer: MEDICAID

## 2023-04-28 VITALS
SYSTOLIC BLOOD PRESSURE: 142 MMHG | HEART RATE: 85 BPM | WEIGHT: 293 LBS | TEMPERATURE: 98 F | BODY MASS INDEX: 47.09 KG/M2 | HEIGHT: 66 IN | OXYGEN SATURATION: 98 % | RESPIRATION RATE: 16 BRPM | DIASTOLIC BLOOD PRESSURE: 88 MMHG

## 2023-04-28 DIAGNOSIS — C18.7 MALIGNANT NEOPLASM OF SIGMOID COLON: ICD-10-CM

## 2023-04-28 DIAGNOSIS — C77.2 METASTASIS TO INTESTINAL LYMPH NODE: Primary | ICD-10-CM

## 2023-04-28 PROCEDURE — 96375 TX/PRO/DX INJ NEW DRUG ADDON: CPT | Mod: PN

## 2023-04-28 PROCEDURE — 96365 THER/PROPH/DIAG IV INF INIT: CPT | Mod: PN

## 2023-04-28 PROCEDURE — A4216 STERILE WATER/SALINE, 10 ML: HCPCS | Mod: PN

## 2023-04-28 PROCEDURE — 63600175 PHARM REV CODE 636 W HCPCS: Mod: PN | Performed by: INTERNAL MEDICINE

## 2023-04-28 PROCEDURE — 63600175 PHARM REV CODE 636 W HCPCS: Mod: PN

## 2023-04-28 PROCEDURE — 25000003 PHARM REV CODE 250: Mod: PN | Performed by: INTERNAL MEDICINE

## 2023-04-28 PROCEDURE — 96361 HYDRATE IV INFUSION ADD-ON: CPT | Mod: PN

## 2023-04-28 PROCEDURE — 25000003 PHARM REV CODE 250: Mod: PN

## 2023-04-28 RX ORDER — SODIUM CHLORIDE 9 MG/ML
1000 INJECTION, SOLUTION INTRAVENOUS
Status: DISCONTINUED | OUTPATIENT
Start: 2023-04-28 | End: 2023-04-28 | Stop reason: HOSPADM

## 2023-04-28 RX ORDER — DEXAMETHASONE SODIUM PHOSPHATE 4 MG/ML
8 INJECTION, SOLUTION INTRA-ARTICULAR; INTRALESIONAL; INTRAMUSCULAR; INTRAVENOUS; SOFT TISSUE ONCE
Status: COMPLETED | OUTPATIENT
Start: 2023-04-28 | End: 2023-04-28

## 2023-04-28 RX ORDER — HEPARIN 100 UNIT/ML
500 SYRINGE INTRAVENOUS
Status: DISCONTINUED | OUTPATIENT
Start: 2023-04-28 | End: 2023-04-28 | Stop reason: HOSPADM

## 2023-04-28 RX ORDER — SODIUM CHLORIDE 0.9 % (FLUSH) 0.9 %
10 SYRINGE (ML) INJECTION
Status: DISCONTINUED | OUTPATIENT
Start: 2023-04-28 | End: 2023-04-28 | Stop reason: HOSPADM

## 2023-04-28 RX ORDER — ONDANSETRON 2 MG/ML
8 INJECTION INTRAMUSCULAR; INTRAVENOUS ONCE
Status: COMPLETED | OUTPATIENT
Start: 2023-04-28 | End: 2023-04-28

## 2023-04-28 RX ADMIN — Medication 500 UNITS: at 02:04

## 2023-04-28 RX ADMIN — DEXAMETHASONE SODIUM PHOSPHATE 8 MG: 4 INJECTION INTRA-ARTICULAR; INTRALESIONAL; INTRAMUSCULAR; INTRAVENOUS; SOFT TISSUE at 11:04

## 2023-04-28 RX ADMIN — Medication 10 ML: at 11:04

## 2023-04-28 RX ADMIN — PROMETHAZINE HYDROCHLORIDE 12.5 MG: 25 INJECTION INTRAMUSCULAR; INTRAVENOUS at 12:04

## 2023-04-28 RX ADMIN — SODIUM CHLORIDE 1000 ML: 9 INJECTION, SOLUTION INTRAVENOUS at 11:04

## 2023-04-28 RX ADMIN — Medication 10 ML: at 02:04

## 2023-04-28 RX ADMIN — ONDANSETRON 8 MG: 2 INJECTION INTRAMUSCULAR; INTRAVENOUS at 11:04

## 2023-04-28 NOTE — PLAN OF CARE
Problem: Adult Inpatient Plan of Care  Goal: Patient-Specific Goal (Individualized)  Outcome: Ongoing, Progressing  Flowsheets (Taken 4/28/2023 1410)  Anxieties, Fears or Concerns: Nausea  Individualized Care Needs: willem Graves     Problem: Fatigue  Goal: Improved Activity Tolerance  Intervention: Promote Improved Energy  Flowsheets (Taken 4/28/2023 1410)  Fatigue Management:   activity schedule adjusted   frequent rest breaks encouraged   paced activity encouraged   fatigue-related activity identified  Sleep/Rest Enhancement:   regular sleep/rest pattern promoted   relaxation techniques promoted  Activity Management:   Ambulated -L4   Ambulated in reyes - L4     Problem: Adult Inpatient Plan of Care  Goal: Plan of Care Review  Outcome: Ongoing, Progressing  Flowsheets (Taken 4/28/2023 1410)  Plan of Care Reviewed With: patient  Tolerated treatment with no known distress.  Ambulated from infusion center with steady gait.

## 2023-04-28 NOTE — PROGRESS NOTES
Oncology Nutrition   Chemotherapy Infusion Visit    Nutrition Follow Up   RD met with pt at chairside during infusion tx. Pt present today for IVF. Pt reports she has not been able to keep anything down for the past 2 days. Pt states this past infusion of Folfiri/Avastin really made her feel bad. RD reinforced taking antiemetic as prescribed the day before infusion and continue taking for 3-4 days after tx. Discussed importance of hydration in between infusion tx. Pt weight has been stable.     Pt eligible for TFP order. Food order filled by this RD- will provide to patient at end of infusion today.       Wt Readings from Last 10 Encounters:   04/28/23 133.8 kg (294 lb 15.6 oz)   04/26/23 133.8 kg (294 lb 15.6 oz)   04/26/23 133.8 kg (294 lb 15.6 oz)   04/13/23 133.3 kg (293 lb 14 oz)   04/11/23 133.3 kg (293 lb 14 oz)   04/06/23 133.3 kg (293 lb 14 oz)   03/30/23 131.8 kg (290 lb 9.1 oz)   03/28/23 132.8 kg (292 lb 12.3 oz)   03/27/23 126 kg (277 lb 12.5 oz)   03/14/23 134.5 kg (296 lb 8.3 oz)       All other nutrition questions/concerns addressed as appropriate. Will continue to monitor prn throughout treatment.     Ciera Richardson, SARAH, LDN  04/28/2023  12:24 PM

## 2023-04-28 NOTE — TELEPHONE ENCOUNTER
----- Message from Terrence Cisneros sent at 4/28/2023 10:00 AM CDT -----  Type: Need Medical Advice   Who Called: Patient   Best callback number: [phone]  Additional Information: Patient called stating she was not feeling good this morning, she will be about 30 min late for her 10:30 appointment today  Please call to further assist, Thanks

## 2023-05-01 ENCOUNTER — PATIENT MESSAGE (OUTPATIENT)
Dept: HEMATOLOGY/ONCOLOGY | Facility: CLINIC | Age: 53
End: 2023-05-01
Payer: MEDICAID

## 2023-05-01 NOTE — TELEPHONE ENCOUNTER
Please let her come in for cbc, cmp, IV hydration and anti emetics. I can see her this afternoon as well if she would like.

## 2023-05-02 PROBLEM — R10.9 ABDOMINAL PAIN: Status: ACTIVE | Noted: 2023-05-02

## 2023-05-03 ENCOUNTER — PATIENT MESSAGE (OUTPATIENT)
Dept: PSYCHIATRY | Facility: CLINIC | Age: 53
End: 2023-05-03
Payer: MEDICAID

## 2023-05-03 ENCOUNTER — PATIENT MESSAGE (OUTPATIENT)
Dept: HEMATOLOGY/ONCOLOGY | Facility: CLINIC | Age: 53
End: 2023-05-03
Payer: MEDICAID

## 2023-05-03 PROBLEM — R79.89 ELEVATED LFTS: Status: ACTIVE | Noted: 2023-05-03

## 2023-05-03 PROBLEM — R19.7 DIARRHEA: Status: ACTIVE | Noted: 2023-01-01

## 2023-05-03 PROBLEM — R11.2 NAUSEA AND VOMITING: Status: ACTIVE | Noted: 2020-05-19

## 2023-05-03 PROBLEM — K80.20 CALCULUS OF GALLBLADDER WITHOUT CHOLECYSTITIS WITHOUT OBSTRUCTION: Status: ACTIVE | Noted: 2023-05-03

## 2023-05-05 PROBLEM — Z71.89 ACP (ADVANCE CARE PLANNING): Status: ACTIVE | Noted: 2023-05-05

## 2023-05-05 PROBLEM — R19.7 DIARRHEA: Status: RESOLVED | Noted: 2023-05-03 | Resolved: 2023-05-05

## 2023-05-05 PROBLEM — R79.89 ELEVATED LFTS: Status: RESOLVED | Noted: 2023-05-03 | Resolved: 2023-05-05

## 2023-05-05 PROBLEM — R19.7 DIARRHEA OF PRESUMED INFECTIOUS ORIGIN: Status: ACTIVE | Noted: 2023-05-02

## 2023-05-06 PROBLEM — R10.9 ABDOMINAL PAIN: Status: ACTIVE | Noted: 2023-05-06

## 2023-05-07 PROBLEM — R30.0 DYSURIA: Status: RESOLVED | Noted: 2021-12-23 | Resolved: 2023-05-07

## 2023-05-07 PROBLEM — F41.1 GENERALIZED ANXIETY DISORDER WITH PANIC ATTACKS: Status: RESOLVED | Noted: 2020-06-29 | Resolved: 2023-01-01

## 2023-05-07 PROBLEM — F41.0 GENERALIZED ANXIETY DISORDER WITH PANIC ATTACKS: Status: RESOLVED | Noted: 2020-06-29 | Resolved: 2023-01-01

## 2023-05-07 PROBLEM — F41.9 ANXIETY: Status: RESOLVED | Noted: 2020-05-19 | Resolved: 2023-05-07

## 2023-05-07 PROBLEM — D70.1 CHEMOTHERAPY-INDUCED NEUTROPENIA: Status: RESOLVED | Noted: 2022-01-12 | Resolved: 2023-05-07

## 2023-05-07 PROBLEM — K76.0 FATTY LIVER: Status: RESOLVED | Noted: 2020-03-17 | Resolved: 2023-05-07

## 2023-05-07 PROBLEM — T45.1X5A CHEMOTHERAPY ADVERSE REACTION: Status: RESOLVED | Noted: 2020-07-02 | Resolved: 2023-05-07

## 2023-05-07 PROBLEM — T45.1X5A CHEMOTHERAPY-INDUCED NEUTROPENIA: Status: RESOLVED | Noted: 2022-01-12 | Resolved: 2023-05-07

## 2023-05-07 PROBLEM — F41.9 ANXIETY: Status: RESOLVED | Noted: 2020-05-05 | Resolved: 2023-05-07

## 2023-05-10 ENCOUNTER — TELEPHONE (OUTPATIENT)
Dept: HEMATOLOGY/ONCOLOGY | Facility: CLINIC | Age: 53
End: 2023-05-10
Payer: MEDICAID

## 2023-05-10 ENCOUNTER — PATIENT MESSAGE (OUTPATIENT)
Dept: HEMATOLOGY/ONCOLOGY | Facility: CLINIC | Age: 53
End: 2023-05-10
Payer: MEDICAID

## 2023-05-10 NOTE — NURSING
Patient scheduled for hospital follow up on 5/18 to review hospital findings and future treatments.

## 2023-05-11 PROBLEM — R06.02 SHORTNESS OF BREATH: Status: ACTIVE | Noted: 2023-05-11

## 2023-05-12 ENCOUNTER — TELEPHONE (OUTPATIENT)
Dept: FAMILY MEDICINE | Facility: CLINIC | Age: 53
End: 2023-05-12
Payer: MEDICAID

## 2023-05-12 ENCOUNTER — TELEPHONE (OUTPATIENT)
Dept: GASTROENTEROLOGY | Facility: CLINIC | Age: 53
End: 2023-05-12
Payer: MEDICAID

## 2023-05-12 NOTE — TELEPHONE ENCOUNTER
----- Message from Shane Griffin sent at 5/12/2023 10:39 AM CDT -----  Regarding: hosp f/u  Contact: MARNIE SINGH [[id number]]  Type:  Sooner Appointment Request    Caller is requesting a sooner appointment.  Caller declined first available appointment listed below.  Caller will not accept being placed on the waitlist and is requesting a message be sent to doctor.    Name of Caller:  MARINO Everett    When is the first available appointment?  None through [facility]    Symptoms:  Hosp f/u [facility] DC 5/12    Best Call Back Number:  [phone] (home)     Additional Information:  Please call to advise.

## 2023-05-12 NOTE — TELEPHONE ENCOUNTER
----- Message from Shane Griffin sent at 5/12/2023 10:32 AM CDT -----  Regarding: appt  Contact: MARNIE SINGH [[id number]]  Type:  Sooner Appointment Request    Caller is requesting a sooner appointment.  Caller declined first available appointment listed below.  Caller will not accept being placed on the waitlist and is requesting a message be sent to doctor.    Name of Caller:  MARINO Everett    When is the first available appointment?  None through [facility]    Symptoms:  Hospital f/u [facility] DC 5/12 DX Abdominal Pain    Best Call Back Number:  [phone] (home)     Additional Information:  Please call to advise.

## 2023-05-12 NOTE — TELEPHONE ENCOUNTER
----- Message from Shane Griffin sent at 5/12/2023 10:39 AM CDT -----  Regarding: hosp f/u  Contact: MARNIE SINGH [[id number]]  Type:  Sooner Appointment Request    Caller is requesting a sooner appointment.  Caller declined first available appointment listed below.  Caller will not accept being placed on the waitlist and is requesting a message be sent to doctor.    Name of Caller:  MARINO Everett    When is the first available appointment?  None through [facility]    Symptoms:  Hosp f/u [facility] DC 5/12    Best Call Back Number:  [phone] (home)     Additional Information:  Please call to advise.

## 2023-05-15 ENCOUNTER — OFFICE VISIT (OUTPATIENT)
Dept: HEMATOLOGY/ONCOLOGY | Facility: CLINIC | Age: 53
End: 2023-05-15
Payer: MEDICAID

## 2023-05-15 ENCOUNTER — PATIENT MESSAGE (OUTPATIENT)
Dept: HEMATOLOGY/ONCOLOGY | Facility: CLINIC | Age: 53
End: 2023-05-15

## 2023-05-15 ENCOUNTER — LAB VISIT (OUTPATIENT)
Dept: LAB | Facility: HOSPITAL | Age: 53
End: 2023-05-15
Attending: INTERNAL MEDICINE
Payer: MEDICAID

## 2023-05-15 VITALS
RESPIRATION RATE: 16 BRPM | BODY MASS INDEX: 46.06 KG/M2 | WEIGHT: 286.63 LBS | HEIGHT: 66 IN | SYSTOLIC BLOOD PRESSURE: 102 MMHG | HEART RATE: 107 BPM | TEMPERATURE: 97 F | DIASTOLIC BLOOD PRESSURE: 58 MMHG | OXYGEN SATURATION: 96 %

## 2023-05-15 DIAGNOSIS — C18.7 MALIGNANT NEOPLASM OF SIGMOID COLON: Primary | ICD-10-CM

## 2023-05-15 DIAGNOSIS — R11.0 NAUSEA: ICD-10-CM

## 2023-05-15 DIAGNOSIS — E04.1 THYROID NODULE: ICD-10-CM

## 2023-05-15 DIAGNOSIS — C77.2 METASTASIS TO INTESTINAL LYMPH NODE: ICD-10-CM

## 2023-05-15 DIAGNOSIS — E83.52 HYPERCALCEMIA: ICD-10-CM

## 2023-05-15 DIAGNOSIS — F41.9 ANXIETY: ICD-10-CM

## 2023-05-15 DIAGNOSIS — C18.7 MALIGNANT NEOPLASM OF SIGMOID COLON: ICD-10-CM

## 2023-05-15 DIAGNOSIS — K12.31 MUCOSITIS DUE TO CHEMOTHERAPY: ICD-10-CM

## 2023-05-15 DIAGNOSIS — R19.7 DIARRHEA, UNSPECIFIED TYPE: ICD-10-CM

## 2023-05-15 DIAGNOSIS — R74.01 TRANSAMINITIS: ICD-10-CM

## 2023-05-15 LAB
ALBUMIN SERPL BCP-MCNC: 3.7 G/DL (ref 3.5–5.2)
ALP SERPL-CCNC: 103 U/L (ref 55–135)
ALT SERPL W/O P-5'-P-CCNC: 40 U/L (ref 10–44)
ANION GAP SERPL CALC-SCNC: 8 MMOL/L (ref 8–16)
AST SERPL-CCNC: 35 U/L (ref 10–40)
BASOPHILS # BLD AUTO: 0.07 K/UL (ref 0–0.2)
BASOPHILS NFR BLD: 1.4 % (ref 0–1.9)
BILIRUB SERPL-MCNC: 0.4 MG/DL (ref 0.1–1)
BUN SERPL-MCNC: 14 MG/DL (ref 6–20)
CALCIUM SERPL-MCNC: 11.2 MG/DL (ref 8.7–10.5)
CHLORIDE SERPL-SCNC: 106 MMOL/L (ref 95–110)
CO2 SERPL-SCNC: 24 MMOL/L (ref 23–29)
CREAT SERPL-MCNC: 1.1 MG/DL (ref 0.5–1.4)
DIFFERENTIAL METHOD: ABNORMAL
EOSINOPHIL # BLD AUTO: 0.4 K/UL (ref 0–0.5)
EOSINOPHIL NFR BLD: 7.8 % (ref 0–8)
ERYTHROCYTE [DISTWIDTH] IN BLOOD BY AUTOMATED COUNT: 18.7 % (ref 11.5–14.5)
EST. GFR  (NO RACE VARIABLE): 59.3 ML/MIN/1.73 M^2
GLUCOSE SERPL-MCNC: 109 MG/DL (ref 70–110)
HCT VFR BLD AUTO: 44.6 % (ref 37–48.5)
HGB BLD-MCNC: 14.6 G/DL (ref 12–16)
IMM GRANULOCYTES # BLD AUTO: 0.03 K/UL (ref 0–0.04)
IMM GRANULOCYTES NFR BLD AUTO: 0.6 % (ref 0–0.5)
LYMPHOCYTES # BLD AUTO: 1.5 K/UL (ref 1–4.8)
LYMPHOCYTES NFR BLD: 29.8 % (ref 18–48)
MCH RBC QN AUTO: 31.6 PG (ref 27–31)
MCHC RBC AUTO-ENTMCNC: 32.7 G/DL (ref 32–36)
MCV RBC AUTO: 97 FL (ref 82–98)
MONOCYTES # BLD AUTO: 0.5 K/UL (ref 0.3–1)
MONOCYTES NFR BLD: 10.4 % (ref 4–15)
NEUTROPHILS # BLD AUTO: 2.5 K/UL (ref 1.8–7.7)
NEUTROPHILS NFR BLD: 50 % (ref 38–73)
NRBC BLD-RTO: 0 /100 WBC
PLATELET # BLD AUTO: 267 K/UL (ref 150–450)
PMV BLD AUTO: 10.6 FL (ref 9.2–12.9)
POTASSIUM SERPL-SCNC: 4.2 MMOL/L (ref 3.5–5.1)
PROT SERPL-MCNC: 7.2 G/DL (ref 6–8.4)
RBC # BLD AUTO: 4.62 M/UL (ref 4–5.4)
SODIUM SERPL-SCNC: 138 MMOL/L (ref 136–145)
WBC # BLD AUTO: 4.9 K/UL (ref 3.9–12.7)

## 2023-05-15 PROCEDURE — 4010F PR ACE/ARB THEARPY RXD/TAKEN: ICD-10-PCS | Mod: CPTII,,, | Performed by: INTERNAL MEDICINE

## 2023-05-15 PROCEDURE — 80053 COMPREHEN METABOLIC PANEL: CPT | Mod: PN | Performed by: INTERNAL MEDICINE

## 2023-05-15 PROCEDURE — 1159F MED LIST DOCD IN RCRD: CPT | Mod: CPTII,,, | Performed by: INTERNAL MEDICINE

## 2023-05-15 PROCEDURE — 3078F PR MOST RECENT DIASTOLIC BLOOD PRESSURE < 80 MM HG: ICD-10-PCS | Mod: CPTII,,, | Performed by: INTERNAL MEDICINE

## 2023-05-15 PROCEDURE — 1111F PR DISCHARGE MEDS RECONCILED W/ CURRENT OUTPATIENT MED LIST: ICD-10-PCS | Mod: CPTII,,, | Performed by: INTERNAL MEDICINE

## 2023-05-15 PROCEDURE — 3008F BODY MASS INDEX DOCD: CPT | Mod: CPTII,,, | Performed by: INTERNAL MEDICINE

## 2023-05-15 PROCEDURE — 99214 OFFICE O/P EST MOD 30 MIN: CPT | Mod: PBBFAC,PN | Performed by: INTERNAL MEDICINE

## 2023-05-15 PROCEDURE — 36415 COLL VENOUS BLD VENIPUNCTURE: CPT | Mod: PN | Performed by: INTERNAL MEDICINE

## 2023-05-15 PROCEDURE — 3078F DIAST BP <80 MM HG: CPT | Mod: CPTII,,, | Performed by: INTERNAL MEDICINE

## 2023-05-15 PROCEDURE — 3074F SYST BP LT 130 MM HG: CPT | Mod: CPTII,,, | Performed by: INTERNAL MEDICINE

## 2023-05-15 PROCEDURE — 99999 PR PBB SHADOW E&M-EST. PATIENT-LVL IV: ICD-10-PCS | Mod: PBBFAC,,, | Performed by: INTERNAL MEDICINE

## 2023-05-15 PROCEDURE — 99999 PR PBB SHADOW E&M-EST. PATIENT-LVL IV: CPT | Mod: PBBFAC,,, | Performed by: INTERNAL MEDICINE

## 2023-05-15 PROCEDURE — 4010F ACE/ARB THERAPY RXD/TAKEN: CPT | Mod: CPTII,,, | Performed by: INTERNAL MEDICINE

## 2023-05-15 PROCEDURE — 3008F PR BODY MASS INDEX (BMI) DOCUMENTED: ICD-10-PCS | Mod: CPTII,,, | Performed by: INTERNAL MEDICINE

## 2023-05-15 PROCEDURE — 1111F DSCHRG MED/CURRENT MED MERGE: CPT | Mod: CPTII,,, | Performed by: INTERNAL MEDICINE

## 2023-05-15 PROCEDURE — 1159F PR MEDICATION LIST DOCUMENTED IN MEDICAL RECORD: ICD-10-PCS | Mod: CPTII,,, | Performed by: INTERNAL MEDICINE

## 2023-05-15 PROCEDURE — 99215 OFFICE O/P EST HI 40 MIN: CPT | Mod: S$PBB,,, | Performed by: INTERNAL MEDICINE

## 2023-05-15 PROCEDURE — 85025 COMPLETE CBC W/AUTO DIFF WBC: CPT | Mod: PN | Performed by: INTERNAL MEDICINE

## 2023-05-15 PROCEDURE — 3074F PR MOST RECENT SYSTOLIC BLOOD PRESSURE < 130 MM HG: ICD-10-PCS | Mod: CPTII,,, | Performed by: INTERNAL MEDICINE

## 2023-05-15 PROCEDURE — 99215 PR OFFICE/OUTPT VISIT, EST, LEVL V, 40-54 MIN: ICD-10-PCS | Mod: S$PBB,,, | Performed by: INTERNAL MEDICINE

## 2023-05-15 NOTE — PROGRESS NOTES
PROGRESS NOTE    Subjective:       Patient ID: Gail Mckinnon is a 53 y.o. female.  MRN: [id number]  : 1970    Chief Complaint: Malignant neoplasm of sigmoid colon (Hospital Follow Up)      History of Present Illness:   Gail Mckinnon is a 53 y.o. female who presents with colon cancer, initially stage III and now with LN recurrence.        Interim history:  She was switched to xeloda due to 5 FU shortage for a month. Did not tolerate Xeloda well due hand foot syndrome, blistering of feet, did not take the last day of Xeloda. Completed .     Resumed Folfiri + Avastin on 23. Atropine was held due to prior constipation.     Was admitted to the hospital from -23 for intractable nausea , vomiting and diarrhea as well as abdominal pain. No hematochezia or dark stool was noted.      US showed gallbladder stone and dilated CBD. She was managed conservatively. Had CT abd, pelvis, colonoscopy and MRCP that were relatively unremarkable without signs of cholecystitis. Cholecystectomy was not recommended.     She had recently resumed FOLFIRI and the symptoms started after the first cycle of resuming, never had issues prior to this.  Stayed in the hospital for 10 days.  C diff was negative. No other etiology was established. Symptoms improved over time and she was discharged on . Has intermittent abdominal pain, relieved with pain medications. Is constipated at the moment, taking stool softeners.        Oncology History:  She completed adjuvant FOLFOX in 2020. She tolerated only 4 cycles of FOLFOX before she developed an infusion reaction to oxaliplatin in cycle 5 was well as cycle 6. She then completed 6 cycles of infusional 5 FU.     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 She was offered second line therapy with FOLFIRI and Avastin.      She presented to the ED on  with left flank pain. CT  renal stone study showed Moderate hydronephrosis on the left secondary to 3 mm calculus at the UPJ.    She had a PET scan mid April that showed possible progression of her disease with      She has been to [facility] for another opinion. No change was recommended in systemic therapy but she was offered surgical removal of the aorta caval nodes.  Recent sans at [facility]  show stable disease.      Surgery done 7/12/22. Received 2 more cycle of FOLFIRI without Avastin.     She had repeat imaging at [facility] on 9/24/22 that unfortunately showed disease progression since her surgery with multiple RP nodes and one mediastinal node.       PET 12/8/22  Impression:     1. Progression of disease with interval increase of abdominal and pelvic lymphadenopathy.  2. New area of moderate FDG uptake at the right half of the T9 vertebral body (image 124).  Favor degenerative disc changes.  No underlying osteolytic or osteoblastic lesion.  Recommend continued attention on follow-up.  3. No other evidence of FDG avid disease.     12/22/22  Impression After scan comparison:     1. Metastatic portacaval and left periaortic retroperitoneal lymph nodes which remain stable between the CT of 09/24/2022 and the subsequent PET-CT of 12/08/2022.  There is no evidence of progression of metastatic disease.  2. Nonobstructing bilateral renal calculi and cholelithiasis.    2/10/22:  CT chest abd pelvis  Impression:     Stable periportal, retroperitoneal and mesenteric lymphadenopathy compared to PET-CT 12/08/2022.     Stable right middle lobe 3 mm pulmonary nodule.  No new or enlarging pulmonary nodule.     Hepatic steatosis.  Hepatosplenomegaly.     Cholelithiasis.     Bilateral nonobstructing nephrolithiasis.     Small hiatal hernia with retained contrast in the distal esophagus.  Correlate for reflux.    She had virtual visit at [facility] on 2/17/23.     She has continued FOLFIRI and Avastin.     CT abd pelvis   5/7/23  Impression:     1. Mesenteric inflammatory  changes have worsened since the prior study.  2. Mesenteric, retroperitoneal and left-sided pelvic enlarged lymph nodes are unchanged.       Oncology History:  Oncology History   Malignant neoplasm of sigmoid colon   3/16/2020 Initial Diagnosis    Malignant neoplasm of sigmoid colon       3/31/2020 Cancer Staged    Staging form: Colon and Rectum, AJCC 8th Edition  - Clinical stage from 3/31/2020: Stage IIIC (cT4b, cN2a, cM0)       5/6/2020 - 11/17/2020 Chemotherapy    Treatment Summary   Plan Name: OP FOLFOX 6 Q2W  Treatment Goal: Curative  Status: Inactive  Start Date: 5/6/2020  End Date: 11/6/2020  Provider: Dylan Leyva MD  Chemotherapy: fluorouraciL injection 945 mg, 400 mg/m2 = 945 mg, Intravenous, Clinic/[facility] 1 time, 14 of 14 cycles  Administration: 945 mg (5/6/2020), 945 mg (5/20/2020), 945 mg (6/3/2020), 945 mg (6/17/2020), 945 mg (7/29/2020), 945 mg (8/12/2020), 945 mg (8/26/2020), 945 mg (9/9/2020), 945 mg (9/23/2020), 945 mg (10/6/2020), 945 mg (10/21/2020), 945 mg (11/4/2020)  fluorouraciL 2,400 mg/m2 = 5,665 mg in sodium chloride 0.9% 240 mL chemo infusion, 2,400 mg/m2 = 5,665 mg, Intravenous, Over 46 hours, 14 of 14 cycles  Administration: 5,665 mg (5/6/2020), 5,665 mg (5/20/2020), 5,665 mg (6/3/2020), 5,665 mg (6/17/2020), 5,665 mg (7/29/2020), 5,665 mg (8/12/2020), 5,665 mg (8/26/2020), 5,665 mg (9/9/2020), 5,665 mg (9/23/2020), 5,665 mg (10/6/2020), 5,665 mg (10/21/2020), 5,665 mg (11/4/2020)  leucovorin calcium 900 mg in dextrose 5 % 250 mL infusion, 945 mg, Intravenous, Clinic/[facility] 1 time, 14 of 14 cycles  Administration: 900 mg (5/6/2020), 900 mg (5/20/2020), 900 mg (6/3/2020), 900 mg (6/17/2020), 945 mg (6/30/2020), 945 mg (7/20/2020), 945 mg (7/29/2020), 945 mg (8/12/2020), 945 mg (8/26/2020), 945 mg (9/9/2020), 945 mg (9/23/2020), 945 mg (10/6/2020), 945 mg (10/21/2020), 945 mg (11/4/2020)  oxaliplatin (ELOXATIN) 200 mg in dextrose 5 % 500 mL chemo infusion, 201 mg, Intravenous, Clinic/[facility] 1  time, 6 of 6 cycles  Dose modification: 65 mg/m2 (original dose 85 mg/m2, Cycle 6)  Administration: 200 mg (5/6/2020), 200 mg (5/20/2020), 200 mg (6/3/2020), 200 mg (6/17/2020), 201 mg (6/30/2020), 150 mg (7/20/2020)       6/16/2021 -  Chemotherapy    Treatment Summary   Plan Name: OP COLORECTAL FOLFIRI + BEVACIZUMAB Q2W  Treatment Goal: Control  Status: Active  Start Date: 6/16/2021  End Date: 5/10/2023 (Planned)  Provider: Marah Santo MD  Chemotherapy: fluorouraciL injection 990 mg, 400 mg/m2 = 990 mg, Intravenous, Clinic/[facility] 1 time, 15 of 15 cycles  Administration: 990 mg (6/16/2021), 990 mg (6/30/2021), 990 mg (7/14/2021), 980 mg (7/28/2021), 990 mg (8/11/2021), 990 mg (8/25/2021), 990 mg (9/8/2021), 990 mg (9/22/2021), 990 mg (10/6/2021), 990 mg (10/20/2021), 990 mg (11/3/2021), 990 mg (12/1/2021), 990 mg (12/15/2021), 990 mg (11/17/2021), 990 mg (12/28/2021)  fluorouraciL 2,400 mg/m2 = 5,950 mg in sodium chloride 0.9% 240 mL chemo infusion, 2,400 mg/m2 = 5,950 mg, Intravenous, Over 46 hours, 38 of 39 cycles  Administration: 5,950 mg (6/16/2021), 5,950 mg (6/30/2021), 5,950 mg (7/14/2021), 5,880 mg (7/28/2021), 5,930 mg (8/11/2021), 5,930 mg (8/25/2021), 5,930 mg (9/8/2021), 5,930 mg (9/22/2021), 5,930 mg (10/6/2021), 5,930 mg (10/20/2021), 5,930 mg (11/3/2021), 5,930 mg (12/1/2021), 5,930 mg (12/15/2021), 5,930 mg (11/17/2021), 5,930 mg (12/28/2021), 6,050 mg (5/11/2022), 6,025 mg (3/9/2022), 6,025 mg (2/23/2022), 5,930 mg (2/2/2022), 5,930 mg (1/19/2022), 6,050 mg (4/20/2022), 6,025 mg (4/6/2022), 6,025 mg (3/23/2022), 6,050 mg (6/1/2022), 6,050 mg (6/15/2022), 6,050 mg (10/19/2022), 6,050 mg (10/5/2022), 6,050 mg (11/2/2022), 6,050 mg (11/16/2022), 6,050 mg (11/30/2022), 6,050 mg (1/4/2023), 6,050 mg (12/19/2022), 6,050 mg (1/18/2023), 6,000 mg (4/26/2023), 6,000 mg (4/11/2023), 6,000 mg (2/28/2023), 6,050 mg (2/14/2023), 6,000 mg (2/1/2023)  bevacizumab (AVASTIN) 600 mg in sodium chloride 0.9% 100 mL  chemo infusion, 660 mg, Intravenous, Clinic/[facility] 1 time, 36 of 36 cycles  Dose modification: 5 mg/kg (original dose 5 mg/kg, Cycle 26, Reason: MD Discretion, Comment: 5 mg/kg original dose)  Administration: 600 mg (6/30/2021), 600 mg (7/14/2021), 600 mg (7/28/2021), 600 mg (6/16/2021), 600 mg (8/11/2021), 655 mg (8/25/2021), 600 mg (9/8/2021), 600 mg (9/22/2021), 600 mg (10/6/2021), 600 mg (10/20/2021), 600 mg (11/3/2021), 655 mg (12/1/2021), 600 mg (12/15/2021), 600 mg (11/17/2021), 600 mg (12/28/2021), 600 mg (5/11/2022), 600 mg (3/9/2022), 600 mg (2/23/2022), 600 mg (2/2/2022), 600 mg (1/19/2022), 600 mg (4/20/2022), 680 mg (4/6/2022), 600 mg (3/23/2022), 680 mg (10/5/2022), 680 mg (10/19/2022), 680 mg (11/2/2022), 680 mg (11/16/2022), 680 mg (11/30/2022), 680 mg (1/4/2023), 680 mg (12/19/2022), 680 mg (1/18/2023), 680 mg (3/28/2023), 680 mg (3/14/2023), 680 mg (2/28/2023), 680 mg (2/14/2023), 680 mg (2/1/2023)  irinotecan (CAMPTOSAR) 440 mg in sodium chloride 0.9% 500 mL chemo infusion, 446 mg, Intravenous, Clinic/[facility] 1 time, 40 of 41 cycles  Dose modification: 180 mg/m2 (original dose 180 mg/m2, Cycle 24)  Administration: 440 mg (6/16/2021), 440 mg (6/30/2021), 440 mg (7/14/2021), 440 mg (7/28/2021), 440 mg (8/11/2021), 444 mg (8/25/2021), 440 mg (9/8/2021), 440 mg (9/22/2021), 440 mg (10/6/2021), 440 mg (10/20/2021), 440 mg (11/3/2021), 440 mg (12/1/2021), 440 mg (12/15/2021), 440 mg (11/17/2021), 440 mg (12/28/2021), 440 mg (5/11/2022), 440 mg (3/9/2022), 440 mg (2/23/2022), 440 mg (2/2/2022), 440 mg (1/19/2022), 440 mg (4/20/2022), 440 mg (4/6/2022), 440 mg (3/24/2022), 440 mg (6/1/2022), 440 mg (6/15/2022), 440 mg (10/19/2022), 440 mg (10/5/2022), 440 mg (11/2/2022), 440 mg (11/16/2022), 440 mg (11/30/2022), 440 mg (1/4/2023), 440 mg (12/19/2022), 440 mg (1/18/2023), 440 mg (4/26/2023), 440 mg (4/11/2023), 440 mg (3/28/2023), 440 mg (3/14/2023), 440 mg (2/28/2023), 440 mg (2/14/2023), 440 mg  (2/1/2023)  bevacizumab-awwb (MVASI) 5 mg/kg = 680 mg in sodium chloride 0.9% 100 mL infusion, 5 mg/kg = 680 mg (100 % of original dose 5 mg/kg), Intravenous, Clinic/[facility] 1 time, 2 of 3 cycles  Dose modification: 5 mg/kg (original dose 5 mg/kg, Cycle 39)  Administration: 680 mg (4/11/2023), 680 mg (4/26/2023)       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   4/3/2020 Initial Diagnosis    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       5/6/2020 - 11/17/2020 Chemotherapy    Treatment Summary   Plan Name: OP FOLFOX 6 Q2W  Treatment Goal: Curative  Status: Inactive  Start Date: 5/6/2020  End Date: 11/6/2020  Provider: Dylan Leyva MD  Chemotherapy: fluorouraciL injection 945 mg, 400 mg/m2 = 945 mg, Intravenous, Clinic/[facility] 1 time, 14 of 14 cycles  Administration: 945 mg (5/6/2020), 945 mg (5/20/2020), 945 mg (6/3/2020), 945 mg (6/17/2020), 945 mg (7/29/2020), 945 mg (8/12/2020), 945 mg (8/26/2020), 945 mg (9/9/2020), 945 mg (9/23/2020), 945 mg (10/6/2020), 945 mg (10/21/2020), 945 mg (11/4/2020)  fluorouraciL 2,400 mg/m2 = 5,665 mg in sodium chloride 0.9% 240 mL chemo infusion, 2,400 mg/m2 = 5,665 mg, Intravenous, Over 46 hours, 14 of 14 cycles  Administration: 5,665 mg (5/6/2020), 5,665 mg (5/20/2020), 5,665 mg (6/3/2020), 5,665 mg (6/17/2020), 5,665 mg (7/29/2020), 5,665 mg (8/12/2020), 5,665 mg (8/26/2020), 5,665 mg (9/9/2020), 5,665 mg (9/23/2020), 5,665 mg (10/6/2020), 5,665 mg (10/21/2020), 5,665 mg (11/4/2020)  leucovorin calcium 900 mg in dextrose 5 % 250 mL infusion, 945 mg, Intravenous, Clinic/[facility] 1 time, 14 of 14 cycles  Administration: 900 mg (5/6/2020), 900 mg (5/20/2020), 900 mg (6/3/2020), 900 mg (6/17/2020), 945 mg (6/30/2020), 945 mg (7/20/2020), 945 mg (7/29/2020), 945 mg (8/12/2020), 945 mg (8/26/2020), 945 mg (9/9/2020), 945 mg (9/23/2020), 945 mg (10/6/2020), 945 mg (10/21/2020), 945 mg (11/4/2020)  oxaliplatin (ELOXATIN) 200 mg in dextrose 5 % 500 mL chemo infusion, 201 mg, Intravenous, Clinic/[facility] 1 time, 6  of 6 cycles  Dose modification: 65 mg/m2 (original dose 85 mg/m2, Cycle 6)  Administration: 200 mg (5/6/2020), 200 mg (5/20/2020), 200 mg (6/3/2020), 200 mg (6/17/2020), 201 mg (6/30/2020), 150 mg (7/20/2020)       6/16/2021 -  Chemotherapy    Treatment Summary   Plan Name: OP COLORECTAL FOLFIRI + BEVACIZUMAB Q2W  Treatment Goal: Control  Status: Active  Start Date: 6/16/2021  End Date: 5/10/2023 (Planned)  Provider: Marah Santo MD  Chemotherapy: fluorouraciL injection 990 mg, 400 mg/m2 = 990 mg, Intravenous, Clinic/[facility] 1 time, 15 of 15 cycles  Administration: 990 mg (6/16/2021), 990 mg (6/30/2021), 990 mg (7/14/2021), 980 mg (7/28/2021), 990 mg (8/11/2021), 990 mg (8/25/2021), 990 mg (9/8/2021), 990 mg (9/22/2021), 990 mg (10/6/2021), 990 mg (10/20/2021), 990 mg (11/3/2021), 990 mg (12/1/2021), 990 mg (12/15/2021), 990 mg (11/17/2021), 990 mg (12/28/2021)  fluorouraciL 2,400 mg/m2 = 5,950 mg in sodium chloride 0.9% 240 mL chemo infusion, 2,400 mg/m2 = 5,950 mg, Intravenous, Over 46 hours, 38 of 39 cycles  Administration: 5,950 mg (6/16/2021), 5,950 mg (6/30/2021), 5,950 mg (7/14/2021), 5,880 mg (7/28/2021), 5,930 mg (8/11/2021), 5,930 mg (8/25/2021), 5,930 mg (9/8/2021), 5,930 mg (9/22/2021), 5,930 mg (10/6/2021), 5,930 mg (10/20/2021), 5,930 mg (11/3/2021), 5,930 mg (12/1/2021), 5,930 mg (12/15/2021), 5,930 mg (11/17/2021), 5,930 mg (12/28/2021), 6,050 mg (5/11/2022), 6,025 mg (3/9/2022), 6,025 mg (2/23/2022), 5,930 mg (2/2/2022), 5,930 mg (1/19/2022), 6,050 mg (4/20/2022), 6,025 mg (4/6/2022), 6,025 mg (3/23/2022), 6,050 mg (6/1/2022), 6,050 mg (6/15/2022), 6,050 mg (10/19/2022), 6,050 mg (10/5/2022), 6,050 mg (11/2/2022), 6,050 mg (11/16/2022), 6,050 mg (11/30/2022), 6,050 mg (1/4/2023), 6,050 mg (12/19/2022), 6,050 mg (1/18/2023), 6,000 mg (4/26/2023), 6,000 mg (4/11/2023), 6,000 mg (2/28/2023), 6,050 mg (2/14/2023), 6,000 mg (2/1/2023)  bevacizumab (AVASTIN) 600 mg in sodium chloride 0.9% 100 mL chemo  infusion, 660 mg, Intravenous, Clinic/[facility] 1 time, 36 of 36 cycles  Dose modification: 5 mg/kg (original dose 5 mg/kg, Cycle 26, Reason: MD Discretion, Comment: 5 mg/kg original dose)  Administration: 600 mg (6/30/2021), 600 mg (7/14/2021), 600 mg (7/28/2021), 600 mg (6/16/2021), 600 mg (8/11/2021), 655 mg (8/25/2021), 600 mg (9/8/2021), 600 mg (9/22/2021), 600 mg (10/6/2021), 600 mg (10/20/2021), 600 mg (11/3/2021), 655 mg (12/1/2021), 600 mg (12/15/2021), 600 mg (11/17/2021), 600 mg (12/28/2021), 600 mg (5/11/2022), 600 mg (3/9/2022), 600 mg (2/23/2022), 600 mg (2/2/2022), 600 mg (1/19/2022), 600 mg (4/20/2022), 680 mg (4/6/2022), 600 mg (3/23/2022), 680 mg (10/5/2022), 680 mg (10/19/2022), 680 mg (11/2/2022), 680 mg (11/16/2022), 680 mg (11/30/2022), 680 mg (1/4/2023), 680 mg (12/19/2022), 680 mg (1/18/2023), 680 mg (3/28/2023), 680 mg (3/14/2023), 680 mg (2/28/2023), 680 mg (2/14/2023), 680 mg (2/1/2023)  irinotecan (CAMPTOSAR) 440 mg in sodium chloride 0.9% 500 mL chemo infusion, 446 mg, Intravenous, Clinic/HOD 1 time, 40 of 41 cycles  Dose modification: 180 mg/m2 (original dose 180 mg/m2, Cycle 24)  Administration: 440 mg (6/16/2021), 440 mg (6/30/2021), 440 mg (7/14/2021), 440 mg (7/28/2021), 440 mg (8/11/2021), 444 mg (8/25/2021), 440 mg (9/8/2021), 440 mg (9/22/2021), 440 mg (10/6/2021), 440 mg (10/20/2021), 440 mg (11/3/2021), 440 mg (12/1/2021), 440 mg (12/15/2021), 440 mg (11/17/2021), 440 mg (12/28/2021), 440 mg (5/11/2022), 440 mg (3/9/2022), 440 mg (2/23/2022), 440 mg (2/2/2022), 440 mg (1/19/2022), 440 mg (4/20/2022), 440 mg (4/6/2022), 440 mg (3/24/2022), 440 mg (6/1/2022), 440 mg (6/15/2022), 440 mg (10/19/2022), 440 mg (10/5/2022), 440 mg (11/2/2022), 440 mg (11/16/2022), 440 mg (11/30/2022), 440 mg (1/4/2023), 440 mg (12/19/2022), 440 mg (1/18/2023), 440 mg (4/26/2023), 440 mg (4/11/2023), 440 mg (3/28/2023), 440 mg (3/14/2023), 440 mg (2/28/2023), 440 mg (2/14/2023), 440 mg  (2023)  bevacizumab-awwb (MVASI) 5 mg/kg = 680 mg in sodium chloride 0.9% 100 mL infusion, 5 mg/kg = 680 mg (100 % of original dose 5 mg/kg), Intravenous, Clinic/[facility]OD 1 time, 2 of 3 cycles  Dose modification: 5 mg/kg (original dose 5 mg/kg, Cycle 39)  Administration: 680 mg (2023), 680 mg (2023)           History:  Past Medical History:   Diagnosis Date    Anxiety     Depression     FH: ovarian cancer 3/16/2020    Hx of psychiatric care     Effexor, Paxil, Lexapro, Zoloft, Wellbutrin, Trazodone Buspar    Hyperthyroidism     Hypothyroid     Kidney calculi     Malignant neoplasm of sigmoid colon 3/16/2020    Menorrhagia     Multinodular goiter 2012    Palpitation     Psychiatric problem     Venous insufficiency       Past Surgical History:   Procedure Laterality Date     SECTION, CLASSIC      x3    COLONOSCOPY N/A 2020    Procedure: COLONOSCOPY;  Surgeon: Shane Parker MD;  Location: [facility];  Service: Endoscopy;  Laterality: N/A;    COLONOSCOPY N/A 2022    Procedure: COLONOSCOPY;  Surgeon: Shane Parker MD;  Location: [facility];  Service: Endoscopy;  Laterality: N/A;    COLONOSCOPY N/A 2023    Procedure: COLONOSCOPY;  Surgeon: Shane Parker MD;  [facility];  Service: Endoscopy;  Laterality: N/A;  no cecum anastomosis    CYSTOSCOPY W/ URETERAL STENT PLACEMENT Bilateral 3/25/2020    Procedure: CYSTOSCOPY, WITH URETERAL STENT INSERTION;  Surgeon: Claudio Tyson MD;  Location: [address];  Service: Urology;  Laterality: Bilateral;    INSERTION OF TUNNELED CENTRAL VENOUS CATHETER (CVC) WITH SUBCUTANEOUS PORT N/A 2020    Procedure: [id number];  Surgeon: Dosc Diagnostic Provider;  Location: [address];  Service: Radiology;  Laterality: N/A;  Room 189/[facility]    LAPAROSCOPIC SIGMOIDECTOMY N/A 3/25/2020    Procedure: COLECTOMY, SIGMOID, LAPAROSCOPIC, flex sig, ERAS high;  Surgeon: Silvio Man MD;  Location: [address]  [facility];  Service: Colon and Rectal;  Laterality: N/A;    MOBILIZATION OF SPLENIC FLEXURE  3/25/2020    Procedure: MOBILIZATION, SPLENIC FLEXURE;  Surgeon: Silvio Man MD;  Location: [facility] OR 2ND FLR;  Service: Colon and Rectal;;    tonsillectomy      TOTAL ABDOMINAL HYSTERECTOMY W/ BILATERAL SALPINGOOPHORECTOMY N/A 3/25/2020    Procedure: HYSTERECTOMY, TOTAL, ABDOMINAL, WITH BILATERAL SALPINGO-OOPHORECTOMY;  Surgeon: Keron Brady MD;  Location: [facility] OR 2ND FLR;  Service: Oncology;  Laterality: N/A;     Family History   Problem Relation Age of Onset    Heart disease Father     Diabetes Mother 65    Drug abuse Brother     Drug abuse Brother     Cancer Maternal Aunt         lung cancer    Cancer Maternal Grandmother         stomach cancer- started in ovaries    Ovarian cancer Maternal Grandmother         stomach cancer- started in ovaries    Colon cancer Paternal Uncle     Cancer Paternal Uncle     Ovarian cancer Maternal Aunt     Ovarian cancer Maternal Aunt     Ovarian cancer Cousin         mother had ovarian cancer    Drug abuse Son     Drug abuse Son         clean/sober since 2012    Colon cancer Son     No Known Problems Daughter     Uterine cancer Neg Hx     Breast cancer Neg Hx      Social History     Tobacco Use    Smoking status: Never    Smokeless tobacco: Never   Substance and Sexual Activity    Alcohol use: Yes     Comment: occasionally- twice monthly    Drug use: Never    Sexual activity: Yes     Partners: Male     Birth control/protection: See Surgical Hx        ROS:   Review of Systems   Constitutional:  Positive for malaise/fatigue (first few days). Negative for fever and weight loss.   HENT:  Negative for congestion, hearing loss, nosebleeds and sore throat.    Eyes:  Negative for double vision and photophobia.   Respiratory:  Negative for cough, hemoptysis, sputum production, shortness of breath and wheezing.    Cardiovascular:  Negative for chest pain, palpitations, orthopnea and leg  "swelling.   Gastrointestinal:  Positive for constipation, diarrhea and nausea (improving). Negative for abdominal pain, blood in stool, heartburn and vomiting.   Genitourinary:  Negative for dysuria, frequency, hematuria and urgency.   Musculoskeletal:  Negative for back pain, joint pain and myalgias.   Skin:  Positive for rash (facial, sun sensitive). Negative for itching.   Neurological:  Negative for dizziness, tingling, seizures, weakness and headaches.   Endo/Heme/Allergies:  Negative for polydipsia. Does not bruise/bleed easily.   Psychiatric/Behavioral:  Negative for depression and memory loss. The patient is nervous/anxious. The patient does not have insomnia.       Objective:     Vitals:    05/15/23 1143   BP: (!) 102/58   Pulse: 107   Resp: 16   Temp: 96.8 °F (36 °C)   TempSrc: Temporal   SpO2: 96%   Weight: 130 kg (286 lb 9.6 oz)   Height: 5' 6" (1.676 m)   PainSc: 0-No pain       Wt Readings from Last 10 Encounters:   05/15/23 130 kg (286 lb 9.6 oz)   05/12/23 129.8 kg (286 lb 2.5 oz)   04/28/23 133.8 kg (294 lb 15.6 oz)   04/26/23 133.8 kg (294 lb 15.6 oz)   04/26/23 133.8 kg (294 lb 15.6 oz)   04/13/23 133.3 kg (293 lb 14 oz)   04/11/23 133.3 kg (293 lb 14 oz)   04/06/23 133.3 kg (293 lb 14 oz)   03/30/23 131.8 kg (290 lb 9.1 oz)   03/28/23 132.8 kg (292 lb 12.3 oz)       Physical Examination:   Physical Exam  Vitals and nursing note reviewed.   Constitutional:       General: She is not in acute distress.     Appearance: She is not diaphoretic.   HENT:      Head: Normocephalic.      Mouth/Throat:      Pharynx: No oropharyngeal exudate.   Eyes:      General: No scleral icterus.     Conjunctiva/sclera: Conjunctivae normal.   Neck:      Thyroid: No thyromegaly.   Cardiovascular:      Rate and Rhythm: Normal rate and regular rhythm.      Heart sounds: Normal heart sounds. No murmur heard.  Pulmonary:      Effort: Pulmonary effort is normal. No respiratory distress.      Breath sounds: No stridor. No " wheezing or rales.   Chest:      Chest wall: No tenderness.   Abdominal:      General: Bowel sounds are normal. There is no distension.      Palpations: Abdomen is soft. There is no mass.      Tenderness: There is no abdominal tenderness. There is no rebound.   Musculoskeletal:         General: No tenderness or deformity. Normal range of motion.      Cervical back: Neck supple.   Lymphadenopathy:      Cervical: No cervical adenopathy.   Skin:     General: Skin is warm and dry.      Findings: No erythema or rash.   Neurological:      Mental Status: She is alert and oriented to person, place, and time.      Cranial Nerves: No cranial nerve deficit.      Coordination: Coordination normal.      Gait: Gait is intact.   Psychiatric:         Mood and Affect: Affect normal.         Cognition and Memory: Memory normal.         Judgment: Judgment normal.        Diagnostic Tests:  Significant Imaging: I have reviewed and interpreted all pertinent imaging results/findings.  Laboratory Data:  All pertinent labs have been reviewed.  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 Oxaliplatin was stopped due to severe adverse reaction.     Follow-up CTs and PET in May 2021 showed enlarging retroperitoneal node, concerning for metastatic disease.      She started FOLFIRI + Avastin and  has continued till July 2022.   Given isolated RP toni disease, she has undergone open Left periaortic and aortocaval lymph node dissection on 7/12/2022.     Follow up scans showed disease progression. I have discussed the case with Dr. Montelongo at [facility]. We discussed resuming FOLFIRI-lloyd at this time and then enrolling to  SWOG 2107 (encorafenib/cetuximab +/- nivo) at the time of progression. Trial is now available at [facility].     Extensive hospital records reviewed, CT scans show stable disease. Will give another week to recover and then resume FOLFIRI and Avastin with close monitoring.     Nausea   Continue compazine.     Mucositis   Continue [facility] soln.     Transaminitis  Fluctuates.  Continue to monitor. No obvious liver mets.     Hypercalcemia   Mild, secondary to hyperparathyroidism.  Avoid calcium supplements.     Hypothyroidism  Multinodular goiter   Continue Levothyroxine. Endocrine consult is appreciated.   Had a non diagnostic biopsy in 2012, usg findings have remained stable. However, given uptake on PET this year, an FNA vs repeat usg in 6 months is recommended. Missed due to hospitalization, will re schedule.     Essential HTN   Continue antihypertensives.      Anxiety   Continue to  follow up with Dr. Palm.     MDM includes  :    - Acute or chronic illness or injury that poses a threat to life or bodily function  - Independent review and explanation of 3+ results from unique tests  - Discussion of management and ordering 3+ unique tests  - Extensive discussion of treatment and management  - Prescription drug management  - Drug therapy requiring intensive monitoring for toxicity     I spent 53 min on this encounter.      ECOG SCORE               Discussion:   No follow-ups on file.    Plan was discussed with the patient at length, and she verbalized understanding. Gail was given an opportunity to ask questions that were answered to her satisfaction, and she was advised to call in the interval if any  problems or questions arise.    Electronically signed by Marah Santo MD        Route Chart for Scheduling    Med Onc Chart Routing      Follow up with physician . 5/22   Follow up with [facility]    Infusion scheduling note    Injection scheduling note    Labs CBC and CMP   Scheduling:  Preferred lab:  Lab interval:     Imaging    Pharmacy appointment    Other referrals           Treatment Plan Information   OP COLORECTAL FOLFIRI + BEVACIZUMAB Q2W   Marah Santo MD   Upcoming Treatment Dates - OP COLORECTAL FOLFIRI + BEVACIZUMAB Q2W    5/8/2023       Pre-Medications       LORazepam injection 1 mg       promethazine (PHENERGAN) 6.25 mg in dextrose 5 % 100 mL IVPB       palonosetron 0.25mg/dexAMETHasone 20mg in NS IVPB       Chemotherapy       irinotecan (CAMPTOSAR) 440 mg in sodium chloride 0.9% 522 mL chemo infusion       leucovorin calcium 400 mg/m2 = 1,010 mg in dextrose 5 % 250 mL infusion       fluorouracil (ADRUCIL) 2,400 mg/m2 = 6,050 mg in sodium chloride 0.9% 240 mL chemo infusion       bevacizumab-awwb (MVASI) 5 mg/kg = 680 mg in sodium chloride 0.9% 100 mL infusion       Supportive Care       atropine injection 0.4 mg    Supportive Plan Information  IV FLUIDS AND ELECTROLYTES   Brayden Solo MD   Upcoming Treatment Dates - IV FLUIDS AND ELECTROLYTES    No upcoming days in selected categories.    Therapy Plan Information  PORT FLUSH  Flushes  heparin, porcine (PF) 100 unit/mL injection flush 500 Units  500 Units, Intravenous, Every visit  sodium chloride 0.9% flush 10 mL  10 mL, Intravenous, Every visit

## 2023-05-16 ENCOUNTER — PATIENT MESSAGE (OUTPATIENT)
Dept: HEMATOLOGY/ONCOLOGY | Facility: CLINIC | Age: 53
End: 2023-05-16
Payer: MEDICAID

## 2023-05-17 RX ORDER — LEVOTHYROXINE SODIUM 200 UG/1
200 TABLET ORAL DAILY
Qty: 90 TABLET | Refills: 1 | Status: SHIPPED | OUTPATIENT
Start: 2023-05-17 | End: 2023-09-29

## 2023-05-19 ENCOUNTER — HOSPITAL ENCOUNTER (OUTPATIENT)
Dept: RADIOLOGY | Facility: HOSPITAL | Age: 53
Discharge: HOME | End: 2023-05-19
Attending: INTERNAL MEDICINE
Payer: MEDICAID

## 2023-05-19 DIAGNOSIS — E04.2 MULTINODULAR GOITER: ICD-10-CM

## 2023-05-19 PROCEDURE — 76536 US SOFT TISSUE HEAD NECK THYROID: ICD-10-PCS | Mod: 26,,, | Performed by: RADIOLOGY

## 2023-05-19 PROCEDURE — 76536 US EXAM OF HEAD AND NECK: CPT | Mod: TC,PO

## 2023-05-19 PROCEDURE — 76536 US EXAM OF HEAD AND NECK: CPT | Mod: 26,,, | Performed by: RADIOLOGY

## 2023-05-21 ENCOUNTER — TELEPHONE (OUTPATIENT)
Dept: ENDOCRINOLOGY | Facility: CLINIC | Age: 53
End: 2023-05-21
Payer: MEDICAID

## 2023-05-21 DIAGNOSIS — E04.2 MULTINODULAR GOITER: Primary | ICD-10-CM

## 2023-05-22 ENCOUNTER — OFFICE VISIT (OUTPATIENT)
Dept: HEMATOLOGY/ONCOLOGY | Facility: CLINIC | Age: 53
End: 2023-05-22
Payer: MEDICAID

## 2023-05-22 ENCOUNTER — INFUSION (OUTPATIENT)
Dept: INFUSION THERAPY | Facility: HOSPITAL | Age: 53
End: 2023-05-22
Attending: INTERNAL MEDICINE
Payer: MEDICAID

## 2023-05-22 ENCOUNTER — DOCUMENTATION ONLY (OUTPATIENT)
Dept: INFUSION THERAPY | Facility: HOSPITAL | Age: 53
End: 2023-05-22
Payer: MEDICAID

## 2023-05-22 VITALS
OXYGEN SATURATION: 98 % | HEART RATE: 74 BPM | WEIGHT: 284.19 LBS | DIASTOLIC BLOOD PRESSURE: 90 MMHG | SYSTOLIC BLOOD PRESSURE: 131 MMHG | TEMPERATURE: 97 F | RESPIRATION RATE: 16 BRPM | HEIGHT: 66 IN | BODY MASS INDEX: 45.67 KG/M2

## 2023-05-22 VITALS
TEMPERATURE: 97 F | HEART RATE: 91 BPM | BODY MASS INDEX: 45.67 KG/M2 | OXYGEN SATURATION: 98 % | SYSTOLIC BLOOD PRESSURE: 132 MMHG | HEIGHT: 66 IN | WEIGHT: 284.19 LBS | RESPIRATION RATE: 16 BRPM | DIASTOLIC BLOOD PRESSURE: 88 MMHG

## 2023-05-22 DIAGNOSIS — R11.0 NAUSEA: ICD-10-CM

## 2023-05-22 DIAGNOSIS — E83.52 HYPERCALCEMIA: ICD-10-CM

## 2023-05-22 DIAGNOSIS — R74.01 TRANSAMINITIS: ICD-10-CM

## 2023-05-22 DIAGNOSIS — F41.9 ANXIETY: ICD-10-CM

## 2023-05-22 DIAGNOSIS — C77.2 METASTASIS TO INTESTINAL LYMPH NODE: Primary | ICD-10-CM

## 2023-05-22 DIAGNOSIS — B00.1 HERPES LABIALIS: ICD-10-CM

## 2023-05-22 DIAGNOSIS — C18.7 MALIGNANT NEOPLASM OF SIGMOID COLON: ICD-10-CM

## 2023-05-22 DIAGNOSIS — R19.7 DIARRHEA, UNSPECIFIED TYPE: ICD-10-CM

## 2023-05-22 DIAGNOSIS — E04.1 THYROID NODULE: ICD-10-CM

## 2023-05-22 DIAGNOSIS — C77.2 METASTASIS TO INTESTINAL LYMPH NODE: ICD-10-CM

## 2023-05-22 DIAGNOSIS — C18.7 MALIGNANT NEOPLASM OF SIGMOID COLON: Primary | ICD-10-CM

## 2023-05-22 DIAGNOSIS — K12.31 MUCOSITIS DUE TO CHEMOTHERAPY: ICD-10-CM

## 2023-05-22 PROCEDURE — 96367 TX/PROPH/DG ADDL SEQ IV INF: CPT | Mod: PN

## 2023-05-22 PROCEDURE — 63600175 PHARM REV CODE 636 W HCPCS: Mod: PN | Performed by: INTERNAL MEDICINE

## 2023-05-22 PROCEDURE — 99215 OFFICE O/P EST HI 40 MIN: CPT | Mod: S$PBB,,, | Performed by: INTERNAL MEDICINE

## 2023-05-22 PROCEDURE — 96417 CHEMO IV INFUS EACH ADDL SEQ: CPT | Mod: PN

## 2023-05-22 PROCEDURE — 1111F DSCHRG MED/CURRENT MED MERGE: CPT | Mod: CPTII,,, | Performed by: INTERNAL MEDICINE

## 2023-05-22 PROCEDURE — 3008F BODY MASS INDEX DOCD: CPT | Mod: CPTII,,, | Performed by: INTERNAL MEDICINE

## 2023-05-22 PROCEDURE — 3075F SYST BP GE 130 - 139MM HG: CPT | Mod: CPTII,,, | Performed by: INTERNAL MEDICINE

## 2023-05-22 PROCEDURE — 3008F PR BODY MASS INDEX (BMI) DOCUMENTED: ICD-10-PCS | Mod: CPTII,,, | Performed by: INTERNAL MEDICINE

## 2023-05-22 PROCEDURE — 3080F DIAST BP >= 90 MM HG: CPT | Mod: CPTII,,, | Performed by: INTERNAL MEDICINE

## 2023-05-22 PROCEDURE — 99215 PR OFFICE/OUTPT VISIT, EST, LEVL V, 40-54 MIN: ICD-10-PCS | Mod: S$PBB,,, | Performed by: INTERNAL MEDICINE

## 2023-05-22 PROCEDURE — 1111F PR DISCHARGE MEDS RECONCILED W/ CURRENT OUTPATIENT MED LIST: ICD-10-PCS | Mod: CPTII,,, | Performed by: INTERNAL MEDICINE

## 2023-05-22 PROCEDURE — 96413 CHEMO IV INFUSION 1 HR: CPT | Mod: PN

## 2023-05-22 PROCEDURE — 99999 PR PBB SHADOW E&M-EST. PATIENT-LVL IV: ICD-10-PCS | Mod: PBBFAC,,, | Performed by: INTERNAL MEDICINE

## 2023-05-22 PROCEDURE — 25000003 PHARM REV CODE 250: Mod: PN | Performed by: INTERNAL MEDICINE

## 2023-05-22 PROCEDURE — 4010F ACE/ARB THERAPY RXD/TAKEN: CPT | Mod: CPTII,,, | Performed by: INTERNAL MEDICINE

## 2023-05-22 PROCEDURE — 3080F PR MOST RECENT DIASTOLIC BLOOD PRESSURE >= 90 MM HG: ICD-10-PCS | Mod: CPTII,,, | Performed by: INTERNAL MEDICINE

## 2023-05-22 PROCEDURE — 4010F PR ACE/ARB THEARPY RXD/TAKEN: ICD-10-PCS | Mod: CPTII,,, | Performed by: INTERNAL MEDICINE

## 2023-05-22 PROCEDURE — 96375 TX/PRO/DX INJ NEW DRUG ADDON: CPT | Mod: PN

## 2023-05-22 PROCEDURE — 99214 OFFICE O/P EST MOD 30 MIN: CPT | Mod: PBBFAC,PN,25 | Performed by: INTERNAL MEDICINE

## 2023-05-22 PROCEDURE — 3075F PR MOST RECENT SYSTOLIC BLOOD PRESS GE 130-139MM HG: ICD-10-PCS | Mod: CPTII,,, | Performed by: INTERNAL MEDICINE

## 2023-05-22 PROCEDURE — 99999 PR PBB SHADOW E&M-EST. PATIENT-LVL IV: CPT | Mod: PBBFAC,,, | Performed by: INTERNAL MEDICINE

## 2023-05-22 RX ORDER — OLMESARTAN MEDOXOMIL 20 MG/1
20 TABLET ORAL DAILY
Qty: 30 TABLET | Refills: 5 | Status: CANCELLED | OUTPATIENT
Start: 2023-05-22 | End: 2024-05-21

## 2023-05-22 RX ORDER — HEPARIN 100 UNIT/ML
500 SYRINGE INTRAVENOUS
Status: CANCELLED | OUTPATIENT
Start: 2023-05-24

## 2023-05-22 RX ORDER — HEPARIN 100 UNIT/ML
500 SYRINGE INTRAVENOUS
Status: CANCELLED | OUTPATIENT
Start: 2023-05-22

## 2023-05-22 RX ORDER — VALACYCLOVIR HYDROCHLORIDE 500 MG/1
500 TABLET, FILM COATED ORAL 2 TIMES DAILY
Qty: 6 TABLET | Refills: 3 | Status: SHIPPED | OUTPATIENT
Start: 2023-05-22 | End: 2023-08-17 | Stop reason: ALTCHOICE

## 2023-05-22 RX ORDER — SODIUM CHLORIDE 9 MG/ML
1000 INJECTION, SOLUTION INTRAVENOUS
Status: CANCELLED | OUTPATIENT
Start: 2023-05-24

## 2023-05-22 RX ORDER — SODIUM CHLORIDE 0.9 % (FLUSH) 0.9 %
10 SYRINGE (ML) INJECTION
Status: CANCELLED | OUTPATIENT
Start: 2023-05-22

## 2023-05-22 RX ORDER — LORAZEPAM 2 MG/ML
1 INJECTION INTRAMUSCULAR
Status: COMPLETED | OUTPATIENT
Start: 2023-05-22 | End: 2023-05-22

## 2023-05-22 RX ORDER — HEPARIN 100 UNIT/ML
500 SYRINGE INTRAVENOUS
Status: DISCONTINUED | OUTPATIENT
Start: 2023-05-22 | End: 2023-05-22 | Stop reason: HOSPADM

## 2023-05-22 RX ORDER — LORAZEPAM 2 MG/ML
1 INJECTION INTRAMUSCULAR
Status: CANCELLED | OUTPATIENT
Start: 2023-05-22 | End: 2023-05-22

## 2023-05-22 RX ORDER — ATROPINE SULFATE 0.4 MG/ML
0.4 INJECTION, SOLUTION ENDOTRACHEAL; INTRAMEDULLARY; INTRAMUSCULAR; INTRAVENOUS; SUBCUTANEOUS ONCE AS NEEDED
Status: COMPLETED | OUTPATIENT
Start: 2023-05-22 | End: 2023-05-22

## 2023-05-22 RX ORDER — ATROPINE SULFATE 0.4 MG/ML
0.4 INJECTION, SOLUTION ENDOTRACHEAL; INTRAMEDULLARY; INTRAMUSCULAR; INTRAVENOUS; SUBCUTANEOUS ONCE AS NEEDED
Status: CANCELLED | OUTPATIENT
Start: 2023-05-22

## 2023-05-22 RX ORDER — SODIUM CHLORIDE 0.9 % (FLUSH) 0.9 %
10 SYRINGE (ML) INJECTION
Status: CANCELLED | OUTPATIENT
Start: 2023-05-24

## 2023-05-22 RX ADMIN — LEUCOVORIN CALCIUM 1010 MG: 350 INJECTION, POWDER, LYOPHILIZED, FOR SOLUTION INTRAMUSCULAR; INTRAVENOUS at 02:05

## 2023-05-22 RX ADMIN — FLUOROURACIL 6000 MG: 50 INJECTION, SOLUTION INTRAVENOUS at 02:05

## 2023-05-22 RX ADMIN — BEVACIZUMAB-AWWB 680 MG: 400 INJECTION, SOLUTION INTRAVENOUS at 11:05

## 2023-05-22 RX ADMIN — SODIUM CHLORIDE 440 MG: 9 INJECTION, SOLUTION INTRAVENOUS at 12:05

## 2023-05-22 RX ADMIN — SODIUM CHLORIDE: 9 INJECTION, SOLUTION INTRAVENOUS at 10:05

## 2023-05-22 RX ADMIN — PROMETHAZINE HYDROCHLORIDE 6.25 MG: 25 INJECTION INTRAMUSCULAR; INTRAVENOUS at 11:05

## 2023-05-22 RX ADMIN — LORAZEPAM 1 MG: 2 INJECTION INTRAMUSCULAR; INTRAVENOUS at 10:05

## 2023-05-22 RX ADMIN — PALONOSETRON: 0.05 INJECTION, SOLUTION INTRAVENOUS at 10:05

## 2023-05-22 RX ADMIN — ATROPINE SULFATE 0.4 MG: 0.4 INJECTION, SOLUTION INTRAVENOUS at 12:05

## 2023-05-22 NOTE — PLAN OF CARE
Pt arrived to clinic for Avastin/FOLFIRI treatment and tolerated well with no changes throughout therapy. Pt educated on line care at home and aware of number to call for any pump issues. Pt with chemo spill kit and aware of how to use if needed. Pt aware of side effects of meds and aware of f/u appts and discharged to home in NAD with 5FU pump infusing with ease.

## 2023-05-22 NOTE — PROGRESS NOTES
Oncology Nutrition   Gail Mckinnon   1970    Therapeutic Food Pantry     Pt eligible for Therapeutic Food Pantry . Order filled out with patient at chairside. All patient questions or concerns regarding  and/or nutrition status addressed. RD to continue to monitor prn and provide patient food while under active treatment.     Food order filled by this RD and provided to patient at end of infusion today.    Mya Rivera, DAVIDN, LDN, [facility]  05/22/2023  3:57 PM

## 2023-05-22 NOTE — PROGRESS NOTES
PROGRESS NOTE    Subjective:       Patient ID: Gail Mckinnon is a 53 y.o. female.  MRN: [id number]  : 1970    Chief Complaint: Malignant neoplasm of sigmoid colon      History of Present Illness:   Gail Mckinnon is a 53 y.o. female who presents with colon cancer, initially stage III and now with LN recurrence.    On FOLIRI+ Avastin sine .     She was briefly switched to xeloda due to 5 FU shortage for a month. Did not tolerate Xeloda well due hand foot syndrome, blistering of feet, did not take the last day of Xeloda. Completed .     Resumed Folfiri + Avastin on 23. Atropine was held due to prior constipation.     Was admitted to the hospital from -23 for intractable nausea , vomiting and diarrhea as well as abdominal pain. No hematochezia or dark stool was noted.      US showed gallbladder stone and dilated CBD. She was managed conservatively. Had CT abd, pelvis, colonoscopy and MRCP that were relatively unremarkable without signs of cholecystitis. Cholecystectomy was not recommended.     She had recently resumed FOLFIRI and the symptoms started after the first cycle of resuming, never had issues prior to this.  Stayed in the hospital for 10 days.  C diff was negative. No other etiology was established. Symptoms improved over time and she was discharged on .     Interim history:  Chemo was held last week to allow for recovery. Feels better.   Intermittent constipation.Abdominal pain is intermittent, bad about 3-4 nights ago, after eating, took a pain medication, has not had any recurrent issues.   Flare of herpes labialis.   Completed US thyroid.     Answers submitted by the patient for this visit:  Review of Systems Questionnaire (Submitted on 2023)  appetite change : No  unexpected weight change: No  mouth sores: Yes  visual disturbance: No  adenopathy: No      Oncology History:  She completed adjuvant FOLFOX  in November 2020. She tolerated only 4 cycles of FOLFOX before she developed an infusion reaction to oxaliplatin in cycle 5 was well as cycle 6. She then completed 6 cycles of infusional 5 FU.     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 She was offered second line therapy with FOLFIRI and Avastin.      She presented to the ED on 11/26 with left flank pain. CT renal stone study showed Moderate hydronephrosis on the left secondary to 3 mm calculus at the UPJ.    She had a PET scan mid April that showed possible progression of her disease with      She has been to [facility] for another opinion. No change was recommended in systemic therapy but she was offered surgical removal of the aorta caval nodes.  Recent sans at [facility]  show stable disease.      Surgery done 7/12/22. Received 2 more cycle of FOLFIRI without Avastin.     She had repeat imaging at [facility] on 9/24/22 that unfortunately showed disease progression since her surgery with multiple RP nodes and one mediastinal node.       PET 12/8/22  Impression:     1. Progression of disease with interval increase of abdominal and pelvic lymphadenopathy.  2. New area of moderate FDG uptake at the right half of the T9 vertebral body (image 124).  Favor degenerative disc changes.  No underlying osteolytic or osteoblastic lesion.  Recommend continued attention on follow-up.  3. No other evidence of FDG avid disease.     12/22/22  Impression After scan comparison:     1. Metastatic portacaval and left periaortic retroperitoneal lymph nodes which remain stable between the CT of 09/24/2022 and the subsequent PET-CT of 12/08/2022.  There is no evidence of progression of metastatic disease.  2. Nonobstructing bilateral renal calculi and cholelithiasis.    2/10/22:  CT chest abd pelvis  Impression:     Stable periportal, retroperitoneal and mesenteric lymphadenopathy compared to PET-CT 12/08/2022.     Stable right middle lobe 3 mm pulmonary nodule.  No new or  enlarging pulmonary nodule.     Hepatic steatosis.  Hepatosplenomegaly.     Cholelithiasis.     Bilateral nonobstructing nephrolithiasis.     Small hiatal hernia with retained contrast in the distal esophagus.  Correlate for reflux.    She had virtual visit at [facility] on 2/17/23.     She has continued FOLFIRI and Avastin.     CT abd pelvis   5/7/23  Impression:     1. Mesenteric inflammatory changes have worsened since the prior study.  2. Mesenteric, retroperitoneal and left-sided pelvic enlarged lymph nodes are unchanged.       Oncology History:  Oncology History   Malignant neoplasm of sigmoid colon   3/16/2020 Initial Diagnosis    Malignant neoplasm of sigmoid colon       3/31/2020 Cancer Staged    Staging form: Colon and Rectum, AJCC 8th Edition  - Clinical stage from 3/31/2020: Stage IIIC (cT4b, cN2a, cM0)       5/6/2020 - 11/17/2020 Chemotherapy    Treatment Summary   Plan Name: OP FOLFOX 6 Q2W  Treatment Goal: Curative  Status: Inactive  Start Date: 5/6/2020  End Date: 11/6/2020  Provider: Dylan Leyva MD  Chemotherapy: fluorouraciL injection 945 mg, 400 mg/m2 = 945 mg, Intravenous, Clinic/[facility] 1 time, 14 of 14 cycles  Administration: 945 mg (5/6/2020), 945 mg (5/20/2020), 945 mg (6/3/2020), 945 mg (6/17/2020), 945 mg (7/29/2020), 945 mg (8/12/2020), 945 mg (8/26/2020), 945 mg (9/9/2020), 945 mg (9/23/2020), 945 mg (10/6/2020), 945 mg (10/21/2020), 945 mg (11/4/2020)  fluorouraciL 2,400 mg/m2 = 5,665 mg in sodium chloride 0.9% 240 mL chemo infusion, 2,400 mg/m2 = 5,665 mg, Intravenous, Over 46 hours, 14 of 14 cycles  Administration: 5,665 mg (5/6/2020), 5,665 mg (5/20/2020), 5,665 mg (6/3/2020), 5,665 mg (6/17/2020), 5,665 mg (7/29/2020), 5,665 mg (8/12/2020), 5,665 mg (8/26/2020), 5,665 mg (9/9/2020), 5,665 mg (9/23/2020), 5,665 mg (10/6/2020), 5,665 mg (10/21/2020), 5,665 mg (11/4/2020)  leucovorin calcium 900 mg in dextrose 5 % 250 mL infusion, 945 mg, Intravenous, [facility] 1 time, 14 of 14  cycles  Administration: 900 mg (5/6/2020), 900 mg (5/20/2020), 900 mg (6/3/2020), 900 mg (6/17/2020), 945 mg (6/30/2020), 945 mg (7/20/2020), 945 mg (7/29/2020), 945 mg (8/12/2020), 945 mg (8/26/2020), 945 mg (9/9/2020), 945 mg (9/23/2020), 945 mg (10/6/2020), 945 mg (10/21/2020), 945 mg (11/4/2020)  oxaliplatin (ELOXATIN) 200 mg in dextrose 5 % 500 mL chemo infusion, 201 mg, Intravenous, Clinic/[facility] 1 time, 6 of 6 cycles  Dose modification: 65 mg/m2 (original dose 85 mg/m2, Cycle 6)  Administration: 200 mg (5/6/2020), 200 mg (5/20/2020), 200 mg (6/3/2020), 200 mg (6/17/2020), 201 mg (6/30/2020), 150 mg (7/20/2020)       6/16/2021 -  Chemotherapy    Treatment Summary   Plan Name: OP COLORECTAL FOLFIRI + BEVACIZUMAB Q2W  Treatment Goal: Control  Status: Active  Start Date: 6/16/2021  End Date: 7/5/2023 (Planned)  Provider: Marah Santo MD  Chemotherapy: fluorouraciL injection 990 mg, 400 mg/m2 = 990 mg, Intravenous, Clinic/[facility] 1 time, 15 of 15 cycles  Administration: 990 mg (6/16/2021), 990 mg (6/30/2021), 990 mg (7/14/2021), 980 mg (7/28/2021), 990 mg (8/11/2021), 990 mg (8/25/2021), 990 mg (9/8/2021), 990 mg (9/22/2021), 990 mg (10/6/2021), 990 mg (10/20/2021), 990 mg (11/3/2021), 990 mg (12/1/2021), 990 mg (12/15/2021), 990 mg (11/17/2021), 990 mg (12/28/2021)  fluorouraciL 2,400 mg/m2 = 5,950 mg in sodium chloride 0.9% 240 mL chemo infusion, 2,400 mg/m2 = 5,950 mg, Intravenous, Over 46 hours, 38 of 42 cycles  Administration: 5,950 mg (6/16/2021), 5,950 mg (6/30/2021), 5,950 mg (7/14/2021), 5,880 mg (7/28/2021), 5,930 mg (8/11/2021), 5,930 mg (8/25/2021), 5,930 mg (9/8/2021), 5,930 mg (9/22/2021), 5,930 mg (10/6/2021), 5,930 mg (10/20/2021), 5,930 mg (11/3/2021), 5,930 mg (12/1/2021), 5,930 mg (12/15/2021), 5,930 mg (11/17/2021), 5,930 mg (12/28/2021), 6,050 mg (5/11/2022), 6,025 mg (3/9/2022), 6,025 mg (2/23/2022), 5,930 mg (2/2/2022), 5,930 mg (1/19/2022), 6,050 mg (4/20/2022), 6,025 mg (4/6/2022), 6,025 mg  (3/23/2022), 6,050 mg (6/1/2022), 6,050 mg (6/15/2022), 6,050 mg (10/19/2022), 6,050 mg (10/5/2022), 6,050 mg (11/2/2022), 6,050 mg (11/16/2022), 6,050 mg (11/30/2022), 6,050 mg (1/4/2023), 6,050 mg (12/19/2022), 6,050 mg (1/18/2023), 6,000 mg (4/26/2023), 6,000 mg (4/11/2023), 6,000 mg (2/28/2023), 6,050 mg (2/14/2023), 6,000 mg (2/1/2023)  bevacizumab (AVASTIN) 600 mg in sodium chloride 0.9% 100 mL chemo infusion, 660 mg, Intravenous, [facility] 1 time, 36 of 36 cycles  Dose modification: 5 mg/kg (original dose 5 mg/kg, Cycle 26, Reason: MD Discretion, Comment: 5 mg/kg original dose)  Administration: 600 mg (6/30/2021), 600 mg (7/14/2021), 600 mg (7/28/2021), 600 mg (6/16/2021), 600 mg (8/11/2021), 655 mg (8/25/2021), 600 mg (9/8/2021), 600 mg (9/22/2021), 600 mg (10/6/2021), 600 mg (10/20/2021), 600 mg (11/3/2021), 655 mg (12/1/2021), 600 mg (12/15/2021), 600 mg (11/17/2021), 600 mg (12/28/2021), 600 mg (5/11/2022), 600 mg (3/9/2022), 600 mg (2/23/2022), 600 mg (2/2/2022), 600 mg (1/19/2022), 600 mg (4/20/2022), 680 mg (4/6/2022), 600 mg (3/23/2022), 680 mg (10/5/2022), 680 mg (10/19/2022), 680 mg (11/2/2022), 680 mg (11/16/2022), 680 mg (11/30/2022), 680 mg (1/4/2023), 680 mg (12/19/2022), 680 mg (1/18/2023), 680 mg (3/28/2023), 680 mg (3/14/2023), 680 mg (2/28/2023), 680 mg (2/14/2023), 680 mg (2/1/2023)  irinotecan (CAMPTOSAR) 440 mg in sodium chloride 0.9% 500 mL chemo infusion, 446 mg, Intravenous, [facility] 1 time, 40 of 44 cycles  Dose modification: 180 mg/m2 (original dose 180 mg/m2, Cycle 24)  Administration: 440 mg (6/16/2021), 440 mg (6/30/2021), 440 mg (7/14/2021), 440 mg (7/28/2021), 440 mg (8/11/2021), 444 mg (8/25/2021), 440 mg (9/8/2021), 440 mg (9/22/2021), 440 mg (10/6/2021), 440 mg (10/20/2021), 440 mg (11/3/2021), 440 mg (12/1/2021), 440 mg (12/15/2021), 440 mg (11/17/2021), 440 mg (12/28/2021), 440 mg (5/11/2022), 440 mg (3/9/2022), 440 mg (2/23/2022), 440 mg (2/2/2022), 440 mg (1/19/2022),  440 mg (4/20/2022), 440 mg (4/6/2022), 440 mg (3/24/2022), 440 mg (6/1/2022), 440 mg (6/15/2022), 440 mg (10/19/2022), 440 mg (10/5/2022), 440 mg (11/2/2022), 440 mg (11/16/2022), 440 mg (11/30/2022), 440 mg (1/4/2023), 440 mg (12/19/2022), 440 mg (1/18/2023), 440 mg (4/26/2023), 440 mg (4/11/2023), 440 mg (3/28/2023), 440 mg (3/14/2023), 440 mg (2/28/2023), 440 mg (2/14/2023), 440 mg (2/1/2023)  bevacizumab-awwb (MVASI) 5 mg/kg = 680 mg in sodium chloride 0.9% 100 mL infusion, 5 mg/kg = 680 mg (100 % of original dose 5 mg/kg), Intravenous, Clinic/[facility] 1 time, 2 of 6 cycles  Dose modification: 5 mg/kg (original dose 5 mg/kg, Cycle 39)  Administration: 680 mg (4/11/2023), 680 mg (4/26/2023)       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   4/3/2020 Initial Diagnosis    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       5/6/2020 - 11/17/2020 Chemotherapy    Treatment Summary   Plan Name: OP FOLFOX 6 Q2W  Treatment Goal: Curative  Status: Inactive  Start Date: 5/6/2020  End Date: 11/6/2020  Provider: Dylan Leyva MD  Chemotherapy: fluorouraciL injection 945 mg, 400 mg/m2 = 945 mg, Intravenous, Clinic/[facility] 1 time, 14 of 14 cycles  Administration: 945 mg (5/6/2020), 945 mg (5/20/2020), 945 mg (6/3/2020), 945 mg (6/17/2020), 945 mg (7/29/2020), 945 mg (8/12/2020), 945 mg (8/26/2020), 945 mg (9/9/2020), 945 mg (9/23/2020), 945 mg (10/6/2020), 945 mg (10/21/2020), 945 mg (11/4/2020)  fluorouraciL 2,400 mg/m2 = 5,665 mg in sodium chloride 0.9% 240 mL chemo infusion, 2,400 mg/m2 = 5,665 mg, Intravenous, Over 46 hours, 14 of 14 cycles  Administration: 5,665 mg (5/6/2020), 5,665 mg (5/20/2020), 5,665 mg (6/3/2020), 5,665 mg (6/17/2020), 5,665 mg (7/29/2020), 5,665 mg (8/12/2020), 5,665 mg (8/26/2020), 5,665 mg (9/9/2020), 5,665 mg (9/23/2020), 5,665 mg (10/6/2020), 5,665 mg (10/21/2020), 5,665 mg (11/4/2020)  leucovorin calcium 900 mg in dextrose 5 % 250 mL infusion, 945 mg, Intravenous, [facility] 1 time, 14 of 14 cycles  Administration:  900 mg (5/6/2020), 900 mg (5/20/2020), 900 mg (6/3/2020), 900 mg (6/17/2020), 945 mg (6/30/2020), 945 mg (7/20/2020), 945 mg (7/29/2020), 945 mg (8/12/2020), 945 mg (8/26/2020), 945 mg (9/9/2020), 945 mg (9/23/2020), 945 mg (10/6/2020), 945 mg (10/21/2020), 945 mg (11/4/2020)  oxaliplatin (ELOXATIN) 200 mg in dextrose 5 % 500 mL chemo infusion, 201 mg, Intravenous, Clinic/[facility] 1 time, 6 of 6 cycles  Dose modification: 65 mg/m2 (original dose 85 mg/m2, Cycle 6)  Administration: 200 mg (5/6/2020), 200 mg (5/20/2020), 200 mg (6/3/2020), 200 mg (6/17/2020), 201 mg (6/30/2020), 150 mg (7/20/2020)       6/16/2021 -  Chemotherapy    Treatment Summary   Plan Name: OP COLORECTAL FOLFIRI + BEVACIZUMAB Q2W  Treatment Goal: Control  Status: Active  Start Date: 6/16/2021  End Date: 7/5/2023 (Planned)  Provider: Marah Santo MD  Chemotherapy: fluorouraciL injection 990 mg, 400 mg/m2 = 990 mg, Intravenous, Clinic/[facility] 1 time, 15 of 15 cycles  Administration: 990 mg (6/16/2021), 990 mg (6/30/2021), 990 mg (7/14/2021), 980 mg (7/28/2021), 990 mg (8/11/2021), 990 mg (8/25/2021), 990 mg (9/8/2021), 990 mg (9/22/2021), 990 mg (10/6/2021), 990 mg (10/20/2021), 990 mg (11/3/2021), 990 mg (12/1/2021), 990 mg (12/15/2021), 990 mg (11/17/2021), 990 mg (12/28/2021)  fluorouraciL 2,400 mg/m2 = 5,950 mg in sodium chloride 0.9% 240 mL chemo infusion, 2,400 mg/m2 = 5,950 mg, Intravenous, Over 46 hours, 38 of 42 cycles  Administration: 5,950 mg (6/16/2021), 5,950 mg (6/30/2021), 5,950 mg (7/14/2021), 5,880 mg (7/28/2021), 5,930 mg (8/11/2021), 5,930 mg (8/25/2021), 5,930 mg (9/8/2021), 5,930 mg (9/22/2021), 5,930 mg (10/6/2021), 5,930 mg (10/20/2021), 5,930 mg (11/3/2021), 5,930 mg (12/1/2021), 5,930 mg (12/15/2021), 5,930 mg (11/17/2021), 5,930 mg (12/28/2021), 6,050 mg (5/11/2022), 6,025 mg (3/9/2022), 6,025 mg (2/23/2022), 5,930 mg (2/2/2022), 5,930 mg (1/19/2022), 6,050 mg (4/20/2022), 6,025 mg (4/6/2022), 6,025 mg (3/23/2022), 6,050 mg  (6/1/2022), 6,050 mg (6/15/2022), 6,050 mg (10/19/2022), 6,050 mg (10/5/2022), 6,050 mg (11/2/2022), 6,050 mg (11/16/2022), 6,050 mg (11/30/2022), 6,050 mg (1/4/2023), 6,050 mg (12/19/2022), 6,050 mg (1/18/2023), 6,000 mg (4/26/2023), 6,000 mg (4/11/2023), 6,000 mg (2/28/2023), 6,050 mg (2/14/2023), 6,000 mg (2/1/2023)  bevacizumab (AVASTIN) 600 mg in sodium chloride 0.9% 100 mL chemo infusion, 660 mg, Intravenous, [facility] 1 time, 36 of 36 cycles  Dose modification: 5 mg/kg (original dose 5 mg/kg, Cycle 26, Reason: MD Discretion, Comment: 5 mg/kg original dose)  Administration: 600 mg (6/30/2021), 600 mg (7/14/2021), 600 mg (7/28/2021), 600 mg (6/16/2021), 600 mg (8/11/2021), 655 mg (8/25/2021), 600 mg (9/8/2021), 600 mg (9/22/2021), 600 mg (10/6/2021), 600 mg (10/20/2021), 600 mg (11/3/2021), 655 mg (12/1/2021), 600 mg (12/15/2021), 600 mg (11/17/2021), 600 mg (12/28/2021), 600 mg (5/11/2022), 600 mg (3/9/2022), 600 mg (2/23/2022), 600 mg (2/2/2022), 600 mg (1/19/2022), 600 mg (4/20/2022), 680 mg (4/6/2022), 600 mg (3/23/2022), 680 mg (10/5/2022), 680 mg (10/19/2022), 680 mg (11/2/2022), 680 mg (11/16/2022), 680 mg (11/30/2022), 680 mg (1/4/2023), 680 mg (12/19/2022), 680 mg (1/18/2023), 680 mg (3/28/2023), 680 mg (3/14/2023), 680 mg (2/28/2023), 680 mg (2/14/2023), 680 mg (2/1/2023)  irinotecan (CAMPTOSAR) 440 mg in sodium chloride 0.9% 500 mL chemo infusion, 446 mg, Intravenous, Clinic/[facility] 1 time, 40 of 44 cycles  Dose modification: 180 mg/m2 (original dose 180 mg/m2, Cycle 24)  Administration: 440 mg (6/16/2021), 440 mg (6/30/2021), 440 mg (7/14/2021), 440 mg (7/28/2021), 440 mg (8/11/2021), 444 mg (8/25/2021), 440 mg (9/8/2021), 440 mg (9/22/2021), 440 mg (10/6/2021), 440 mg (10/20/2021), 440 mg (11/3/2021), 440 mg (12/1/2021), 440 mg (12/15/2021), 440 mg (11/17/2021), 440 mg (12/28/2021), 440 mg (5/11/2022), 440 mg (3/9/2022), 440 mg (2/23/2022), 440 mg (2/2/2022), 440 mg (1/19/2022), 440 mg (4/20/2022),  440 mg (2022), 440 mg (3/24/2022), 440 mg (2022), 440 mg (6/15/2022), 440 mg (10/19/2022), 440 mg (10/5/2022), 440 mg (2022), 440 mg (2022), 440 mg (2022), 440 mg (2023), 440 mg (2022), 440 mg (2023), 440 mg (2023), 440 mg (2023), 440 mg (3/28/2023), 440 mg (3/14/2023), 440 mg (2023), 440 mg (2023), 440 mg (2023)  bevacizumab-awwb (MVASI) 5 mg/kg = 680 mg in sodium chloride 0.9% 100 mL infusion, 5 mg/kg = 680 mg (100 % of original dose 5 mg/kg), Intravenous, Clinic/[facility] 1 time, 2 of 6 cycles  Dose modification: 5 mg/kg (original dose 5 mg/kg, Cycle 39)  Administration: 680 mg (2023), 680 mg (2023)           History:  Past Medical History:   Diagnosis Date    Anxiety     Depression     FH: ovarian cancer 3/16/2020    Hx of psychiatric care     Effexor, Paxil, Lexapro, Zoloft, Wellbutrin, Trazodone Buspar    Hyperthyroidism     Hypothyroid     Kidney calculi     Malignant neoplasm of sigmoid colon 3/16/2020    Menorrhagia     Multinodular goiter 2012    Palpitation     Psychiatric problem     Venous insufficiency       Past Surgical History:   Procedure Laterality Date     SECTION, CLASSIC      x3    COLONOSCOPY N/A 2020    Procedure: COLONOSCOPY;  Surgeon: Shane Parker MD;  Location: [facility] ENDO;  Service: Endoscopy;  Laterality: N/A;    COLONOSCOPY N/A 2022    Procedure: COLONOSCOPY;  Surgeon: Shane Parker MD;  Location: [facility] ENDO;  Service: Endoscopy;  Laterality: N/A;    COLONOSCOPY N/A 2023    Procedure: COLONOSCOPY;  Surgeon: Shane Parker MD;  Location: [facility];  Service: Endoscopy;  Laterality: N/A;  no cecum anastomosis    CYSTOSCOPY W/ URETERAL STENT PLACEMENT Bilateral 3/25/2020    Procedure: CYSTOSCOPY, WITH URETERAL STENT INSERTION;  Surgeon: Claudio Tyson MD;  Location: [address];  Service: Urology;  Laterality: Bilateral;    INSERTION OF TUNNELED CENTRAL VENOUS CATHETER (CVC)  WITH SUBCUTANEOUS PORT N/A 5/1/2020    Procedure: [id number];  Surgeon: Stephen Diagnostic Provider;  Location: [facility] OR 2ND FLR;  Service: Radiology;  Laterality: N/A;  Room 189/Cindy    LAPAROSCOPIC SIGMOIDECTOMY N/A 3/25/2020    Procedure: COLECTOMY, SIGMOID, LAPAROSCOPIC, flex sig, ERAS high;  Surgeon: Silvio Man MD;  Location: [facility] OR 2ND FLR;  Service: Colon and Rectal;  Laterality: N/A;    MOBILIZATION OF SPLENIC FLEXURE  3/25/2020    Procedure: MOBILIZATION, SPLENIC FLEXURE;  Surgeon: Silvio Man MD;  Location: [facility] OR 2ND FLR;  Service: Colon and Rectal;;    tonsillectomy      TOTAL ABDOMINAL HYSTERECTOMY W/ BILATERAL SALPINGOOPHORECTOMY N/A 3/25/2020    Procedure: HYSTERECTOMY, TOTAL, ABDOMINAL, WITH BILATERAL SALPINGO-OOPHORECTOMY;  Surgeon: Keron Brady MD;  Location: [facility] OR 2ND FLR;  Service: Oncology;  Laterality: N/A;     Family History   Problem Relation Age of Onset    Heart disease Father     Diabetes Mother 65    Drug abuse Brother     Drug abuse Brother     Cancer Maternal Aunt         lung cancer    Cancer Maternal Grandmother         stomach cancer- started in ovaries    Ovarian cancer Maternal Grandmother         stomach cancer- started in ovaries    Colon cancer Paternal Uncle     Cancer Paternal Uncle     Ovarian cancer Maternal Aunt     Ovarian cancer Maternal Aunt     Ovarian cancer Cousin         mother had ovarian cancer    Drug abuse Son     Drug abuse Son         clean/sober since 2012    Colon cancer Son     No Known Problems Daughter     Uterine cancer Neg Hx     Breast cancer Neg Hx      Social History     Tobacco Use    Smoking status: Never    Smokeless tobacco: Never   Substance and Sexual Activity    Alcohol use: Yes     Comment: occasionally- twice monthly    Drug use: Never    Sexual activity: Yes     Partners: Male     Birth control/protection: See Surgical Hx        ROS:   Review of Systems   Constitutional:  Positive for malaise/fatigue  "(first few days). Negative for fever and weight loss.   HENT:  Negative for congestion, hearing loss, nosebleeds and sore throat.    Eyes:  Negative for double vision and photophobia.   Respiratory:  Negative for cough, hemoptysis, sputum production, shortness of breath and wheezing.    Cardiovascular:  Negative for chest pain, palpitations, orthopnea and leg swelling.   Gastrointestinal:  Positive for constipation, diarrhea and nausea (improving). Negative for abdominal pain, blood in stool, heartburn and vomiting.   Genitourinary:  Negative for dysuria, frequency, hematuria and urgency.   Musculoskeletal:  Negative for back pain, joint pain and myalgias.   Skin:  Positive for rash (facial, sun sensitive, lower lip lesion). Negative for itching.   Neurological:  Negative for dizziness, tingling, seizures, weakness and headaches.   Endo/Heme/Allergies:  Negative for polydipsia. Does not bruise/bleed easily.   Psychiatric/Behavioral:  Negative for depression and memory loss. The patient is nervous/anxious. The patient does not have insomnia.       Objective:     Vitals:    05/22/23 0850   BP: (!) 131/90   Pulse: 74   Resp: 16   Temp: 97 °F (36.1 °C)   TempSrc: Temporal   SpO2: 98%   Weight: 128.9 kg (284 lb 2.8 oz)   Height: 5' 6" (1.676 m)   PainSc: 0-No pain       Wt Readings from Last 10 Encounters:   05/22/23 128.9 kg (284 lb 2.8 oz)   05/15/23 130 kg (286 lb 9.6 oz)   05/12/23 129.8 kg (286 lb 2.5 oz)   04/28/23 133.8 kg (294 lb 15.6 oz)   04/26/23 133.8 kg (294 lb 15.6 oz)   04/26/23 133.8 kg (294 lb 15.6 oz)   04/13/23 133.3 kg (293 lb 14 oz)   04/11/23 133.3 kg (293 lb 14 oz)   04/06/23 133.3 kg (293 lb 14 oz)   03/30/23 131.8 kg (290 lb 9.1 oz)       Physical Examination:   Physical Exam  Vitals and nursing note reviewed.   Constitutional:       General: She is not in acute distress.     Appearance: She is not diaphoretic.   HENT:      Head: Normocephalic.      Mouth/Throat:      Pharynx: No oropharyngeal " exudate.   Eyes:      General: No scleral icterus.     Conjunctiva/sclera: Conjunctivae normal.   Neck:      Thyroid: No thyromegaly.   Cardiovascular:      Rate and Rhythm: Normal rate and regular rhythm.      Heart sounds: Normal heart sounds. No murmur heard.  Pulmonary:      Effort: Pulmonary effort is normal. No respiratory distress.      Breath sounds: No stridor. No wheezing or rales.   Chest:      Chest wall: No tenderness.   Abdominal:      General: Bowel sounds are normal. There is no distension.      Palpations: Abdomen is soft. There is no mass.      Tenderness: There is no abdominal tenderness. There is no rebound.   Musculoskeletal:         General: No tenderness or deformity. Normal range of motion.      Cervical back: Neck supple.   Lymphadenopathy:      Cervical: No cervical adenopathy.   Skin:     General: Skin is warm and dry.      Findings: Lesion (lower lip herpes labialis) present. No erythema or rash.   Neurological:      Mental Status: She is alert and oriented to person, place, and time.      Cranial Nerves: No cranial nerve deficit.      Coordination: Coordination normal.      Gait: Gait is intact.   Psychiatric:         Mood and Affect: Affect normal.         Cognition and Memory: Memory normal.         Judgment: Judgment normal.        Diagnostic Tests:  Significant Imaging: I have reviewed and interpreted all pertinent imaging results/findings.  Laboratory Data:  All pertinent labs have been reviewed.  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 Oxaliplatin was stopped due to severe adverse reaction.     Follow-up CTs and PET in May 2021 showed enlarging retroperitoneal node, concerning for metastatic disease.      She started FOLFIRI + Avastin and has continued till July 2022.   Given isolated RP toni disease, she has undergone open Left periaortic and aortocaval lymph node dissection on 7/12/2022.     Follow up scans showed disease progression. I have discussed the case with Dr. Montelongo at [facility]. We discussed resuming FOLFIRI-lloyd at this time and then enrolling to  SWOG 2107 (encorafenib/cetuximab +/- nivo) at the time of progression. Trial is now available at [facility].     Extensive hospital records reviewed, CT scans show stable disease.  Clinically ok to resume FOLFIRI and Avastin with close monitoring.     Nausea   Continue compazine.     Mucositis   Continue [facility] soln.     Transaminitis  Fluctuates.  Continue to monitor. No obvious liver mets.     Hypercalcemia   Mild, secondary to hyperparathyroidism.  Avoid calcium supplements.     Hypothyroidism  Multinodular goiter   Continue Levothyroxine.  Had a non diagnostic biopsy in 2012, usg findings have remained stable. Reviewed repeat thyroid US, stable, will follow up with Endocrine.     Essential HTN   Continue antihypertensives.      Herpes labialis   Valtrex x 3 days. Monitor     Anxiety   Continue to  follow up with Dr. Palm.     MDM includes  :    - Acute or chronic illness or injury that poses a threat to life or bodily function  - Independent review and explanation of 3+ results from unique tests  - Discussion of management and ordering 3+ unique tests  - Extensive discussion of treatment and management  - Prescription drug management  - Drug therapy requiring intensive monitoring  for toxicity     I spent 53 min on this encounter.      ECOG SCORE    1 - Restricted in strenuous activity-ambulatory and able to carry out work of a light nature           Discussion:   No follow-ups on file.    Plan was discussed with the patient at length, and she verbalized understanding. Gail was given an opportunity to ask questions that were answered to her satisfaction, and she was advised to call in the interval if any problems or questions arise.    Electronically signed by Marah Santo MD        Route Chart for Scheduling    Med Onc Chart Routing      Follow up with physician . 6/19   Follow up with TAVO    Infusion scheduling note    Injection scheduling note    Labs CBC and CMP   Scheduling:  Preferred lab:  Lab interval:  6/5,6/19   Imaging    Pharmacy appointment    Other referrals           Treatment Plan Information   OP COLORECTAL FOLFIRI + BEVACIZUMAB Q2W   Marah Santo MD   Upcoming Treatment Dates - OP COLORECTAL FOLFIRI + BEVACIZUMAB Q2W    5/22/2023       Pre-Medications       LORazepam injection 1 mg       promethazine (PHENERGAN) 6.25 mg in dextrose 5 % (D5W) 100 mL IVPB       palonosetron 0.25mg/dexAMETHasone 20mg in NS IVPB       Chemotherapy       irinotecan (CAMPTOSAR) 440 mg in sodium chloride 0.9% 522 mL chemo infusion       leucovorin calcium 400 mg/m2 = 1,010 mg in dextrose 5 % (D5W) 250 mL infusion       fluorouracil (ADRUCIL) 2,400 mg/m2 = 6,050 mg in sodium chloride 0.9% 240 mL chemo infusion       bevacizumab-awwb (MVASI) 5 mg/kg = 680 mg in sodium chloride 0.9% 100 mL infusion       Supportive Care       atropine injection 0.4 mg  6/5/2023       Pre-Medications       LORazepam injection 1 mg       promethazine (PHENERGAN) 6.25 mg in dextrose 5 % (D5W) 100 mL IVPB       palonosetron 0.25mg/dexAMETHasone 20mg in NS IVPB       Chemotherapy       bevacizumab-awwb (MVASI) 5 mg/kg = 680 mg in sodium chloride 0.9% 100 mL infusion       irinotecan (CAMPTOSAR) 440 mg in sodium  chloride 0.9% 522 mL chemo infusion       leucovorin calcium 400 mg/m2 = 1,010 mg in dextrose 5 % (D5W) 250 mL infusion       fluorouracil (ADRUCIL) 2,400 mg/m2 = 6,050 mg in sodium chloride 0.9% 240 mL chemo infusion       Supportive Care       atropine injection 0.4 mg  6/19/2023       Pre-Medications       LORazepam injection 1 mg       promethazine (PHENERGAN) 6.25 mg in dextrose 5 % (D5W) 100 mL IVPB       palonosetron 0.25mg/dexAMETHasone 20mg in NS IVPB       Chemotherapy       bevacizumab-awwb (MVASI) 5 mg/kg = 680 mg in sodium chloride 0.9% 100 mL infusion       irinotecan (CAMPTOSAR) 440 mg in sodium chloride 0.9% 522 mL chemo infusion       leucovorin calcium 400 mg/m2 = 1,010 mg in dextrose 5 % (D5W) 250 mL infusion       fluorouracil (ADRUCIL) 2,400 mg/m2 = 6,050 mg in sodium chloride 0.9% 240 mL chemo infusion       Supportive Care       atropine injection 0.4 mg  7/3/2023       Pre-Medications       LORazepam injection 1 mg       promethazine (PHENERGAN) 6.25 mg in dextrose 5 % (D5W) 100 mL IVPB       palonosetron 0.25mg/dexAMETHasone 20mg in NS IVPB       Chemotherapy       bevacizumab-awwb (MVASI) 5 mg/kg = 680 mg in sodium chloride 0.9% 100 mL infusion       irinotecan (CAMPTOSAR) 440 mg in sodium chloride 0.9% 522 mL chemo infusion       leucovorin calcium 400 mg/m2 = 1,010 mg in dextrose 5 % (D5W) 250 mL infusion       fluorouracil (ADRUCIL) 2,400 mg/m2 = 6,050 mg in sodium chloride 0.9% 240 mL chemo infusion       Supportive Care       atropine injection 0.4 mg    Supportive Plan Information  IV FLUIDS AND ELECTROLYTES   Brayden Solo MD   Upcoming Treatment Dates - IV FLUIDS AND ELECTROLYTES    No upcoming days in selected categories.    Therapy Plan Information  PORT FLUSH  Flushes  heparin, porcine (PF) 100 unit/mL injection flush 500 Units  500 Units, Intravenous, Every visit  sodium chloride 0.9% flush 10 mL  10 mL, Intravenous, Every visit

## 2023-05-22 NOTE — TELEPHONE ENCOUNTER
No care due was identified.  [facility] Embedded Care Due Messages. Reference number: [id number].   5/22/2023 9:02:57 AM CDT

## 2023-05-22 NOTE — TELEPHONE ENCOUNTER
No care due was identified.  [facility] Embedded Care Due Messages. Reference number: [id number].   5/22/2023 5:43:07 PM CDT

## 2023-05-23 RX ORDER — OLMESARTAN MEDOXOMIL 20 MG/1
20 TABLET ORAL DAILY
Qty: 30 TABLET | Refills: 5 | Status: SHIPPED | OUTPATIENT
Start: 2023-05-23 | End: 2023-12-13 | Stop reason: SDUPTHER

## 2023-05-24 ENCOUNTER — INFUSION (OUTPATIENT)
Dept: INFUSION THERAPY | Facility: HOSPITAL | Age: 53
End: 2023-05-24
Attending: INTERNAL MEDICINE
Payer: MEDICAID

## 2023-05-24 VITALS
BODY MASS INDEX: 45.67 KG/M2 | HEIGHT: 66 IN | WEIGHT: 284.19 LBS | DIASTOLIC BLOOD PRESSURE: 82 MMHG | HEART RATE: 62 BPM | RESPIRATION RATE: 18 BRPM | TEMPERATURE: 98 F | SYSTOLIC BLOOD PRESSURE: 137 MMHG

## 2023-05-24 DIAGNOSIS — C18.7 MALIGNANT NEOPLASM OF SIGMOID COLON: ICD-10-CM

## 2023-05-24 DIAGNOSIS — C77.2 METASTASIS TO INTESTINAL LYMPH NODE: Primary | ICD-10-CM

## 2023-05-24 PROCEDURE — 96361 HYDRATE IV INFUSION ADD-ON: CPT | Mod: PN

## 2023-05-24 PROCEDURE — 25000003 PHARM REV CODE 250: Mod: PN | Performed by: INTERNAL MEDICINE

## 2023-05-24 PROCEDURE — 96360 HYDRATION IV INFUSION INIT: CPT | Mod: PN

## 2023-05-24 PROCEDURE — 63600175 PHARM REV CODE 636 W HCPCS: Mod: PN | Performed by: INTERNAL MEDICINE

## 2023-05-24 RX ORDER — SODIUM CHLORIDE 9 MG/ML
1000 INJECTION, SOLUTION INTRAVENOUS
Status: DISCONTINUED | OUTPATIENT
Start: 2023-05-24 | End: 2023-05-24 | Stop reason: HOSPADM

## 2023-05-24 RX ORDER — HEPARIN 100 UNIT/ML
500 SYRINGE INTRAVENOUS
Status: DISCONTINUED | OUTPATIENT
Start: 2023-05-24 | End: 2023-05-24 | Stop reason: HOSPADM

## 2023-05-24 RX ADMIN — Medication 500 UNITS: at 02:05

## 2023-05-24 RX ADMIN — SODIUM CHLORIDE 1000 ML: 9 INJECTION, SOLUTION INTRAVENOUS at 12:05

## 2023-05-24 NOTE — PLAN OF CARE
Pt arrived to clinic today for IVF infusion and tolerated well. No changes throughout therapy. 5FU pump disconnected and port flushed with heparin. Discharged to home. NAD.

## 2023-06-05 ENCOUNTER — OFFICE VISIT (OUTPATIENT)
Dept: HEMATOLOGY/ONCOLOGY | Facility: CLINIC | Age: 53
End: 2023-06-05
Payer: MEDICAID

## 2023-06-05 ENCOUNTER — INFUSION (OUTPATIENT)
Dept: INFUSION THERAPY | Facility: HOSPITAL | Age: 53
End: 2023-06-05
Attending: INTERNAL MEDICINE
Payer: MEDICAID

## 2023-06-05 ENCOUNTER — DOCUMENTATION ONLY (OUTPATIENT)
Dept: INFUSION THERAPY | Facility: HOSPITAL | Age: 53
End: 2023-06-05
Payer: MEDICAID

## 2023-06-05 VITALS
SYSTOLIC BLOOD PRESSURE: 136 MMHG | OXYGEN SATURATION: 98 % | TEMPERATURE: 98 F | DIASTOLIC BLOOD PRESSURE: 84 MMHG | BODY MASS INDEX: 45.7 KG/M2 | RESPIRATION RATE: 16 BRPM | HEIGHT: 66 IN | HEART RATE: 90 BPM | WEIGHT: 284.38 LBS

## 2023-06-05 VITALS
SYSTOLIC BLOOD PRESSURE: 136 MMHG | HEIGHT: 66 IN | TEMPERATURE: 98 F | WEIGHT: 284.38 LBS | OXYGEN SATURATION: 98 % | RESPIRATION RATE: 16 BRPM | DIASTOLIC BLOOD PRESSURE: 84 MMHG | BODY MASS INDEX: 45.7 KG/M2 | HEART RATE: 90 BPM

## 2023-06-05 DIAGNOSIS — C18.7 MALIGNANT NEOPLASM OF SIGMOID COLON: ICD-10-CM

## 2023-06-05 DIAGNOSIS — E07.9 DISORDER OF THYROID: ICD-10-CM

## 2023-06-05 DIAGNOSIS — C77.2 METASTASIS TO INTESTINAL LYMPH NODE: ICD-10-CM

## 2023-06-05 DIAGNOSIS — C18.7 MALIGNANT NEOPLASM OF SIGMOID COLON: Primary | ICD-10-CM

## 2023-06-05 DIAGNOSIS — C77.2 METASTASIS TO INTESTINAL LYMPH NODE: Primary | ICD-10-CM

## 2023-06-05 DIAGNOSIS — R11.0 NAUSEA: ICD-10-CM

## 2023-06-05 PROCEDURE — 99999 PR PBB SHADOW E&M-EST. PATIENT-LVL V: CPT | Mod: PBBFAC,,, | Performed by: NURSE PRACTITIONER

## 2023-06-05 PROCEDURE — 3079F PR MOST RECENT DIASTOLIC BLOOD PRESSURE 80-89 MM HG: ICD-10-PCS | Mod: CPTII,,, | Performed by: NURSE PRACTITIONER

## 2023-06-05 PROCEDURE — 96416 CHEMO PROLONG INFUSE W/PUMP: CPT | Mod: PN

## 2023-06-05 PROCEDURE — 1111F PR DISCHARGE MEDS RECONCILED W/ CURRENT OUTPATIENT MED LIST: ICD-10-PCS | Mod: CPTII,,, | Performed by: NURSE PRACTITIONER

## 2023-06-05 PROCEDURE — 96413 CHEMO IV INFUSION 1 HR: CPT | Mod: PN

## 2023-06-05 PROCEDURE — 63600175 PHARM REV CODE 636 W HCPCS: Mod: PN | Performed by: NURSE PRACTITIONER

## 2023-06-05 PROCEDURE — 99215 OFFICE O/P EST HI 40 MIN: CPT | Mod: PBBFAC,PN,25 | Performed by: NURSE PRACTITIONER

## 2023-06-05 PROCEDURE — 3075F SYST BP GE 130 - 139MM HG: CPT | Mod: CPTII,,, | Performed by: NURSE PRACTITIONER

## 2023-06-05 PROCEDURE — 96417 CHEMO IV INFUS EACH ADDL SEQ: CPT | Mod: PN

## 2023-06-05 PROCEDURE — 1159F MED LIST DOCD IN RCRD: CPT | Mod: CPTII,,, | Performed by: NURSE PRACTITIONER

## 2023-06-05 PROCEDURE — 99214 OFFICE O/P EST MOD 30 MIN: CPT | Mod: S$PBB,,, | Performed by: NURSE PRACTITIONER

## 2023-06-05 PROCEDURE — 1160F RVW MEDS BY RX/DR IN RCRD: CPT | Mod: CPTII,,, | Performed by: NURSE PRACTITIONER

## 2023-06-05 PROCEDURE — 99214 PR OFFICE/OUTPT VISIT, EST, LEVL IV, 30-39 MIN: ICD-10-PCS | Mod: S$PBB,,, | Performed by: NURSE PRACTITIONER

## 2023-06-05 PROCEDURE — 1160F PR REVIEW ALL MEDS BY PRESCRIBER/CLIN PHARMACIST DOCUMENTED: ICD-10-PCS | Mod: CPTII,,, | Performed by: NURSE PRACTITIONER

## 2023-06-05 PROCEDURE — 1159F PR MEDICATION LIST DOCUMENTED IN MEDICAL RECORD: ICD-10-PCS | Mod: CPTII,,, | Performed by: NURSE PRACTITIONER

## 2023-06-05 PROCEDURE — 3008F PR BODY MASS INDEX (BMI) DOCUMENTED: ICD-10-PCS | Mod: CPTII,,, | Performed by: NURSE PRACTITIONER

## 2023-06-05 PROCEDURE — 99999 PR PBB SHADOW E&M-EST. PATIENT-LVL V: ICD-10-PCS | Mod: PBBFAC,,, | Performed by: NURSE PRACTITIONER

## 2023-06-05 PROCEDURE — 96367 TX/PROPH/DG ADDL SEQ IV INF: CPT | Mod: PN

## 2023-06-05 PROCEDURE — 4010F ACE/ARB THERAPY RXD/TAKEN: CPT | Mod: CPTII,,, | Performed by: NURSE PRACTITIONER

## 2023-06-05 PROCEDURE — 3079F DIAST BP 80-89 MM HG: CPT | Mod: CPTII,,, | Performed by: NURSE PRACTITIONER

## 2023-06-05 PROCEDURE — 3075F PR MOST RECENT SYSTOLIC BLOOD PRESS GE 130-139MM HG: ICD-10-PCS | Mod: CPTII,,, | Performed by: NURSE PRACTITIONER

## 2023-06-05 PROCEDURE — 25000003 PHARM REV CODE 250: Mod: PN | Performed by: NURSE PRACTITIONER

## 2023-06-05 PROCEDURE — 96375 TX/PRO/DX INJ NEW DRUG ADDON: CPT | Mod: PN

## 2023-06-05 PROCEDURE — 4010F PR ACE/ARB THEARPY RXD/TAKEN: ICD-10-PCS | Mod: CPTII,,, | Performed by: NURSE PRACTITIONER

## 2023-06-05 PROCEDURE — 1111F DSCHRG MED/CURRENT MED MERGE: CPT | Mod: CPTII,,, | Performed by: NURSE PRACTITIONER

## 2023-06-05 PROCEDURE — 3008F BODY MASS INDEX DOCD: CPT | Mod: CPTII,,, | Performed by: NURSE PRACTITIONER

## 2023-06-05 RX ORDER — ATROPINE SULFATE 0.4 MG/ML
0.4 INJECTION, SOLUTION ENDOTRACHEAL; INTRAMEDULLARY; INTRAMUSCULAR; INTRAVENOUS; SUBCUTANEOUS ONCE AS NEEDED
Status: COMPLETED | OUTPATIENT
Start: 2023-06-05 | End: 2023-06-05

## 2023-06-05 RX ORDER — LORAZEPAM 2 MG/ML
1 INJECTION INTRAMUSCULAR
Status: CANCELLED | OUTPATIENT
Start: 2023-06-05 | End: 2023-06-05

## 2023-06-05 RX ORDER — HEPARIN 100 UNIT/ML
500 SYRINGE INTRAVENOUS
Status: DISCONTINUED | OUTPATIENT
Start: 2023-06-05 | End: 2023-06-05 | Stop reason: HOSPADM

## 2023-06-05 RX ORDER — HEPARIN 100 UNIT/ML
500 SYRINGE INTRAVENOUS
Status: CANCELLED | OUTPATIENT
Start: 2023-06-07

## 2023-06-05 RX ORDER — SODIUM CHLORIDE 9 MG/ML
1000 INJECTION, SOLUTION INTRAVENOUS
Status: CANCELLED | OUTPATIENT
Start: 2023-06-07

## 2023-06-05 RX ORDER — HEPARIN 100 UNIT/ML
500 SYRINGE INTRAVENOUS
Status: CANCELLED | OUTPATIENT
Start: 2023-06-05

## 2023-06-05 RX ORDER — SODIUM CHLORIDE 0.9 % (FLUSH) 0.9 %
10 SYRINGE (ML) INJECTION
Status: CANCELLED | OUTPATIENT
Start: 2023-06-05

## 2023-06-05 RX ORDER — ATROPINE SULFATE 0.4 MG/ML
0.4 INJECTION, SOLUTION ENDOTRACHEAL; INTRAMEDULLARY; INTRAMUSCULAR; INTRAVENOUS; SUBCUTANEOUS ONCE AS NEEDED
Status: CANCELLED | OUTPATIENT
Start: 2023-06-05

## 2023-06-05 RX ORDER — LORAZEPAM 2 MG/ML
1 INJECTION INTRAMUSCULAR
Status: COMPLETED | OUTPATIENT
Start: 2023-06-05 | End: 2023-06-05

## 2023-06-05 RX ORDER — SODIUM CHLORIDE 0.9 % (FLUSH) 0.9 %
10 SYRINGE (ML) INJECTION
Status: CANCELLED | OUTPATIENT
Start: 2023-06-07

## 2023-06-05 RX ADMIN — BEVACIZUMAB-AWWB 680 MG: 400 INJECTION, SOLUTION INTRAVENOUS at 12:06

## 2023-06-05 RX ADMIN — SODIUM CHLORIDE: 9 INJECTION, SOLUTION INTRAVENOUS at 10:06

## 2023-06-05 RX ADMIN — LEUCOVORIN CALCIUM 1010 MG: 350 INJECTION, POWDER, LYOPHILIZED, FOR SOLUTION INTRAMUSCULAR; INTRAVENOUS at 01:06

## 2023-06-05 RX ADMIN — ATROPINE SULFATE 0.4 MG: 0.4 INJECTION, SOLUTION INTRAVENOUS at 01:06

## 2023-06-05 RX ADMIN — PROMETHAZINE HYDROCHLORIDE 6.25 MG: 25 INJECTION INTRAMUSCULAR; INTRAVENOUS at 11:06

## 2023-06-05 RX ADMIN — SODIUM CHLORIDE 440 MG: 9 INJECTION, SOLUTION INTRAVENOUS at 01:06

## 2023-06-05 RX ADMIN — LORAZEPAM 1 MG: 2 INJECTION INTRAMUSCULAR; INTRAVENOUS at 11:06

## 2023-06-05 RX ADMIN — FLUOROURACIL 6000 MG: 50 INJECTION, SOLUTION INTRAVENOUS at 02:06

## 2023-06-05 RX ADMIN — DEXAMETHASONE SODIUM PHOSPHATE: 10 INJECTION, SOLUTION INTRAMUSCULAR; INTRAVENOUS at 11:06

## 2023-06-05 NOTE — PLAN OF CARE
Pt arrived to clinic for FOLFIRI and Avastin treatments and tolerated well with no changes throughout therapy. Pt educated on line care at home and aware of number to call for any pump issues. Pt with chemo spill kit and aware of how to use if needed. Pt aware of side effects of meds and aware of f/u appts and discharged to home in NAD with  with 5FU pump infusing with ease.

## 2023-06-05 NOTE — PROGRESS NOTES
Oncology Nutrition   Chemotherapy Infusion Visit    Nutrition Follow Up   RD met with pt at chairside during infusion tx. Pt states she is doing well nutritionally. Pt eligible for TFP order. Pt would like to  her TFP food order on Wednesday. RD agreeable to  date.       Wt Readings from Last 10 Encounters:   06/05/23 129 kg (284 lb 6.3 oz)   06/05/23 129 kg (284 lb 6.3 oz)   05/24/23 128.9 kg (284 lb 2.8 oz)   05/22/23 128.9 kg (284 lb 2.8 oz)   05/22/23 128.9 kg (284 lb 2.8 oz)   05/15/23 130 kg (286 lb 9.6 oz)   05/12/23 129.8 kg (286 lb 2.5 oz)   04/28/23 133.8 kg (294 lb 15.6 oz)   04/26/23 133.8 kg (294 lb 15.6 oz)   04/26/23 133.8 kg (294 lb 15.6 oz)       All other nutrition questions/concerns addressed as appropriate. Will continue to monitor prn throughout treatment.     Ciera Richardson RDN, LDN  06/05/2023  3:59 PM

## 2023-06-05 NOTE — PROGRESS NOTES
PATIENT: Gail Mckinnon  MRN: [id number]  DATE: 6/5/2023    Diagnosis:   1. Malignant neoplasm of sigmoid colon    2. 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 Mckinnon is a 53 y.o. female  patient known to Dr. Santo for diagnosis of colon cancer, initially stage III and now with LN recurrence.      She completed adjuvant FOLFOX in November 2020. She tolerated only 4 cycles of FOLFOX before she developed an infusion reaction to oxaliplatin in cycle 5 was well as cycle 6. She then completed 6 cycles of infusional 5 FU.     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 She is now on second line therapy with FOLFIRI and Avastin     4/18/22 PET scan mid April that showed possible progression of her disease with enlarging lymphadenopahty from 1.7cm to 1.9 cm, no new lesion.     For now, we have continued FOLFIRI+ Avastin with plan to repeat short term imaging. She has been to [facility] for another opinion. No change was recommended in systemic therapy but she was called back to discuss surgical options.      She saw surgical oncology on 5/28 and they recommend surgical removal of the aorta caval nodes.  Recent sans at [facility]  show stable disease.      Surgery done 7/12/22. Received 2 more cycle of FOLFIRI without Avastin.      She had repeat imaging at [facility] on 9/24/22 that unfortunately showed disease progression since her surgery with multiple RP nodes and one mediastinal node.     PET 12/8/22  Impression:     1. Progression of disease with interval increase of abdominal and pelvic lymphadenopathy.  2. New area of moderate FDG uptake at the right half of the T9 vertebral body (image 124).  Favor degenerative disc changes.  No underlying osteolytic or osteoblastic lesion.  Recommend continued attention on follow-up.  3. No other evidence of FDG avid disease.     12/22/22  Impression After scan comparison:     1. Metastatic portacaval  and left periaortic retroperitoneal lymph nodes which remain stable between the CT of 2022 and the subsequent PET-CT of 2022.  There is no evidence of progression of metastatic disease.  2. Nonobstructing bilateral renal calculi and cholelithiasis.    She had virtual visit at [facility] on 23.     She has continued FOLFIRI and Avastin.     Interim:    Patient is here today in consideration of her next cycle of FOLFIRI/Avastin; C42  To note:  Had been getting Xeloda due to shortage of 5FU but did not tolerate this well. Was switched back to 5FU on 23.    Sun burn to face/head/upper torso after time spent in the sun/pool this weekend - discussed sensitivity to sun, use of SPF, wide-brimmed hat, long shirt.     She endorses feeling well.  No diarrhea.    Right sided back pain that is intermittent, mostly positional at night. Chronic; does not radiate.  Denies HA, CP, cough, SOB, abd pain, dysuria, swelling, fevers, chills, new lumps or bumps.     Past Medical History:   Past Medical History:   Diagnosis Date    Anxiety     Depression     FH: ovarian cancer 3/16/2020    Hx of psychiatric care     Effexor, Paxil, Lexapro, Zoloft, Wellbutrin, Trazodone Buspar    Hyperthyroidism     Hypothyroid     Kidney calculi     Malignant neoplasm of sigmoid colon 3/16/2020    Menorrhagia     Multinodular goiter 2012    Palpitation     Psychiatric problem     Venous insufficiency        Past Surgical HIstory:   Past Surgical History:   Procedure Laterality Date     SECTION, CLASSIC      x3    COLONOSCOPY N/A 2020    Procedure: COLONOSCOPY;  Surgeon: Shane Parker MD;  Location: [facility] ENDO;  Service: Endoscopy;  Laterality: N/A;    COLONOSCOPY N/A 2022    Procedure: COLONOSCOPY;  Surgeon: Shane Parker MD;  Location: [facility] ENDO;  Service: Endoscopy;  Laterality: N/A;    COLONOSCOPY N/A 2023    Procedure: COLONOSCOPY;  Surgeon: Shane Parker MD;  Location: [facility] ENDO;  Service:  Endoscopy;  Laterality: N/A;  no cecum anastomosis    CYSTOSCOPY W/ URETERAL STENT PLACEMENT Bilateral 3/25/2020    Procedure: CYSTOSCOPY, WITH URETERAL STENT INSERTION;  Surgeon: Claudio Tyson MD;  Location: [facility] OR 2ND FLR;  Service: Urology;  Laterality: Bilateral;    INSERTION OF TUNNELED CENTRAL VENOUS CATHETER (CVC) WITH SUBCUTANEOUS PORT N/A 5/1/2020    Procedure: [id number];  Surgeon: Dosc Diagnostic Provider;  [facility];  Service: Radiology;  Laterality: N/A;  Room 189/Cindy    LAPAROSCOPIC SIGMOIDECTOMY N/A 3/25/2020    Procedure: COLECTOMY, SIGMOID, LAPAROSCOPIC, flex sig, ERAS high;  Surgeon: Silvio Man MD;  Location: [facility] OR 2ND FLR;  Service: Colon and Rectal;  Laterality: N/A;    MOBILIZATION OF SPLENIC FLEXURE  3/25/2020    Procedure: MOBILIZATION, SPLENIC FLEXURE;  Surgeon: Silvio Man MD;  [facility];  Service: Colon and Rectal;;    tonsillectomy      TOTAL ABDOMINAL HYSTERECTOMY W/ BILATERAL SALPINGOOPHORECTOMY N/A 3/25/2020    Procedure: HYSTERECTOMY, TOTAL, ABDOMINAL, WITH BILATERAL SALPINGO-OOPHORECTOMY;  Surgeon: Keron Brady MD;  [facility];  Service: Oncology;  Laterality: N/A;       Family History:   Family History   Problem Relation Age of Onset    Heart disease Father     Diabetes Mother 65    Drug abuse Brother     Drug abuse Brother     Cancer Maternal Aunt         lung cancer    Cancer Maternal Grandmother         stomach cancer- started in ovaries    Ovarian cancer Maternal Grandmother         stomach cancer- started in ovaries    Colon cancer Paternal Uncle     Cancer Paternal Uncle     Ovarian cancer Maternal Aunt     Ovarian cancer Maternal Aunt     Ovarian cancer Cousin         mother had ovarian cancer    Drug abuse Son     Drug abuse Son         clean/sober since 2012    Colon cancer Son     No Known Problems Daughter     Uterine cancer Neg Hx     Breast cancer Neg Hx        Social History:   reports that she has never smoked. She has never used smokeless tobacco. She reports current alcohol use. She reports that she does not use drugs.    Allergies:  Review of patient's allergies indicates:   Allergen Reactions    Oxaliplatin Other (See Comments)     Itchy hands, throat closed up, trouble hearing - during infusion    Penicillins Hives and Rash     childhood allergy  childhood allergy  unknown       Medications:  Current Outpatient Medications   Medication Sig Dispense Refill    acetaminophen (TYLENOL) 500 MG tablet Take 2 tablets (1,000 mg total) by mouth every 8 (eight) hours. 60 tablet 0    buPROPion (WELLBUTRIN SR) 150 MG TBSR 12 hr tablet Take 1 tablet (150 mg total) by mouth 2 (two) times daily. (Patient taking differently: Take 150 mg by mouth once daily.) 180 tablet 0    FLUoxetine (PROZAC) 20 MG capsule Take 1 capsule (20 mg total) by mouth once daily. 30 capsule 1    granisetron (SANCUSO) 3.1 mg/24 hour Place 1 patch (3.1 mg total) onto the skin every 7 days AS DIRECTED. 4 patch 3    Lactobacillus rhamnosus GG (CULTURELLE) 10 billion cell capsule Take 1 capsule by mouth once daily.      levothyroxine (SYNTHROID) 200 MCG tablet Take 1 tablet (200 mcg total) by mouth once daily. 90 tablet 1    LIDOcaine-prilocaine (EMLA) cream Apply topically as needed (30 to 60 min prior chemo or port use). 30 g 3    LORazepam (ATIVAN) 1 MG tablet Take 1 tablet (1 mg total) by mouth every 12 (twelve) hours as needed for Anxiety (nausea). (Patient taking differently: Take 1 mg by mouth nightly as needed for Anxiety (nausea).) 30 tablet 0    magic mouthwash diphen/antac/lidoc/nysta Swish and swallow 5 mL every 4 hours as needed for mouth pain/ulcers 120 mL 2    multivitamin (THERAGRAN) per tablet Take 1 tablet by mouth once daily.      olmesartan (BENICAR) 20 MG tablet Take 1 tablet (20 mg total) by mouth once daily. 30 tablet 5    ondansetron (ZOFRAN-ODT) 8 MG TbDL Take 1 tablet (8 mg total) by mouth every 8  "(eight) hours as needed. 60 tablet 3    oxyCODONE-acetaminophen (PERCOCET) 5-325 mg per tablet Take 1 tablet by mouth every 4 (four) hours as needed for Pain 40 tablet 0    prochlorperazine (COMPAZINE) 10 MG tablet Take 1 tablet (10 mg total) by mouth every 6 (six) hours as needed. (Patient taking differently: Take 10 mg by mouth every 6 (six) hours as needed (nausea).) 30 tablet 6    senna-docusate 8.6-50 mg (PERICOLACE) 8.6-50 mg per tablet Take 2 tablets by mouth 2 (two) times daily.      traZODone (DESYREL) 50 MG tablet Take 1 tablet (50 mg total) by mouth nightly. (Patient taking differently: Take 50 mg by mouth nightly as needed for Insomnia.) 30 tablet 2    valACYclovir (VALTREX) 500 MG tablet Take 1 tablet (500 mg total) by mouth 2 (two) times daily. 6 tablet 3     No current facility-administered medications for this visit.       Review of Systems   Constitutional:  Negative for appetite change and unexpected weight change.   HENT:  Negative for mouth sores.    Eyes:  Negative for visual disturbance.   Respiratory:  Negative for cough and shortness of breath.    Cardiovascular:  Negative for chest pain.   Gastrointestinal:  Positive for diarrhea. Negative for abdominal pain.   Genitourinary:  Negative for frequency.   Musculoskeletal:  Negative for back pain.   Skin:  Negative for rash.   Neurological:  Negative for headaches.   Hematological:  Negative for adenopathy.   Psychiatric/Behavioral:  The patient is not nervous/anxious.      ECOG Performance Status: 0    Objective:      Vitals: Weight:  stable  Vitals:    06/05/23 0932   BP: 136/84   BP Location: Left arm   Patient Position: Sitting   BP Method: Large (Manual)   Pulse: 90   Resp: 16   Temp: 97.6 °F (36.4 °C)   TempSrc: Temporal   SpO2: 98%   Weight: 129 kg (284 lb 6.3 oz)   Height: 5' 6" (1.676 m)       BMI: Body mass index is 45.9 kg/m².    Physical Exam  Vitals reviewed.   Constitutional:       General: She is not in acute distress.     " Appearance: She is not diaphoretic.   HENT:      Head: Normocephalic and atraumatic.      Mouth/Throat:      Pharynx: Oropharynx is clear.   Eyes:      General: No scleral icterus.  Cardiovascular:      Rate and Rhythm: Normal rate and regular rhythm.      Heart sounds: Normal heart sounds. No murmur heard.  Pulmonary:      Effort: No respiratory distress.      Breath sounds: Normal breath sounds.   Abdominal:      General: Bowel sounds are normal. There is no distension.      Palpations: Abdomen is soft.   Musculoskeletal:      Cervical back: Neck supple.      Right lower leg: No edema.      Left lower leg: No edema.   Lymphadenopathy:      Cervical: No cervical adenopathy.   Skin:     General: Skin is warm and dry.   Neurological:      Mental Status: She is alert and oriented to person, place, and time.      Gait: Gait normal.   Psychiatric:         Mood and Affect: Mood normal.         Behavior: Behavior normal.       Laboratory Data:  Lab Results   Component Value Date    WBC 4.27 06/05/2023    WBC 4.27 06/05/2023    HGB 14.0 06/05/2023    HGB 14.0 06/05/2023    HCT 43.7 06/05/2023    HCT 43.7 06/05/2023    MCV 96 06/05/2023    MCV 96 06/05/2023     06/05/2023     06/05/2023      CMP  Sodium   Date Value Ref Range Status   06/05/2023 141 136 - 145 mmol/L Final     Potassium   Date Value Ref Range Status   06/05/2023 4.1 3.5 - 5.1 mmol/L Final     Chloride   Date Value Ref Range Status   06/05/2023 108 95 - 110 mmol/L Final     CO2   Date Value Ref Range Status   06/05/2023 23 23 - 29 mmol/L Final     Glucose   Date Value Ref Range Status   06/05/2023 99 70 - 110 mg/dL Final     BUN   Date Value Ref Range Status   06/05/2023 12 6 - 20 mg/dL Final     Creatinine   Date Value Ref Range Status   06/05/2023 0.9 0.5 - 1.4 mg/dL Final     Calcium   Date Value Ref Range Status   06/05/2023 10.7 (H) 8.7 - 10.5 mg/dL Final     Total Protein   Date Value Ref Range Status   06/05/2023 7.0 6.0 - 8.4 g/dL Final      Albumin   Date Value Ref Range Status   06/05/2023 3.8 3.5 - 5.2 g/dL Final     Total Bilirubin   Date Value Ref Range Status   06/05/2023 0.4 0.1 - 1.0 mg/dL Final     Comment:     For infants and newborns, interpretation of results should be based  on gestational age, weight and in agreement with clinical  observations.    Premature Infant recommended reference ranges:  Up to 24 hours.............<8.0 mg/dL  Up to 48 hours............<12.0 mg/dL  3-5 days..................<15.0 mg/dL  6-29 days.................<15.0 mg/dL       Alkaline Phosphatase   Date Value Ref Range Status   06/05/2023 123 55 - 135 U/L Final     AST   Date Value Ref Range Status   06/05/2023 37 10 - 40 U/L Final     ALT   Date Value Ref Range Status   06/05/2023 59 (H) 10 - 44 U/L Final     Anion Gap   Date Value Ref Range Status   06/05/2023 10 8 - 16 mmol/L Final     eGFR   Date Value Ref Range Status   06/05/2023 >60.0 >60 mL/min/1.73 m^2 Final     CT Abd/pelvis 05/07/23  Impression:     1. Mesenteric inflammatory changes have worsened since the prior study.  2. Mesenteric, retroperitoneal and left-sided pelvic enlarged lymph nodes are unchanged.  Assessment:       1. Malignant neoplasm of sigmoid colon    2. 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 Oxaliplatin was stopped due to severe adverse reaction.  -Follow-up CTs and PET in May 2021 showed enlarging retroperitoneal node, concerning for metastatic disease.   -She started FOLFIRI + Avastin and has continued till July 2022.   -Given isolated RP toni disease, she has undergone open Left periaortic and aortocaval lymph node dissection on 7/12/2022.   -Due  to progression on imaging, Dr. Santo discussed case with Dr. Montelongo at [facility]. Discussed resuming FOLFIRI-debo vs transitioning to BRAF directed therapy (BEACON). There may be an option to enroll in SWOG 2107 (encorafenib/cetuximab +/- nivo) at [facility] if no prior BRAF inhibitor exposure.[facility] is working with the patient's insurance to see if she can be enrolled there.    -Patient is interested in the trial and therefore we resumed FOLFIRI- Dbeo. 2 follow up scans show stable disease and we will continue. [facility] follow up note reviewed. No other recommendations at this time.   -Being treated with Xeloda due to  5FU shortage, tolerating poorly. Decision was made to switch back to FOLFIRI-debo as 5FU becomes available  -Proceed with FOLFIRI/Avastin today   -f/u with Dr. Santo as scheduled on 06/19 with labs prior:  CBC, CMP, TSH, UA    Nausea  -Continue PO Ondansetron, Compazine PRN  -Will monitor     Diarrhea   -Has improved, take Imodium PRN   -will monitor     Mucositis   -Continue [facility]'s solution PRN  -Monitor     Transaminitis  -Fluctuates, will monitor  -No obvious liver mets.      Hypercalcemia   -Mild, secondary to hyperparathyroidism.    -Avoid calcium supplements.   -Monitor     Hypothyroidism  Multinodular goiter   -Continue Levothyroxine. Endocrine consult is appreciated.  -Had a non diagnostic biopsy in 2012, usg findings have remained stable. However, given uptake on PET this year, an FNA vs repeat usg in 6 months is recommended.      Essential HTN   -Continue antihypertensives  -B/P today 136/84, ok to treat  -Monitor     Anxiety   -Refill of Lorazepam sent today  -Continue to  follow up with Dr. Palm and Dr. Pino    Patient queried and all questions were answered.  Assessment/Plan reviewed and approved by Dr. Santo   30 minutes were spent in coordination of patient's care, record review and counseling.    Route Chart for Scheduling    Med Onc Chart Routing      Follow up with physician . F/u in 2 weeks with   Hansa already scheduled on 06/19 with labs prior:  CBC, CMP, TSH, UA   Follow up with TAVO    Infusion scheduling note   Proceed with C42 today   Injection scheduling note    Labs    Imaging    Pharmacy appointment    Other referrals            Treatment Plan Information   OP COLORECTAL FOLFIRI + BEVACIZUMAB Q2W   Marah Santo MD   Upcoming Treatment Dates - OP COLORECTAL FOLFIRI + BEVACIZUMAB Q2W    6/5/2023       Pre-Medications       LORazepam injection 1 mg       promethazine (PHENERGAN) 6.25 mg in dextrose 5 % (D5W) 100 mL IVPB       palonosetron 0.25mg/dexAMETHasone 20mg in NS IVPB       Chemotherapy       bevacizumab-awwb (MVASI) 5 mg/kg = 680 mg in sodium chloride 0.9% 100 mL infusion       irinotecan (CAMPTOSAR) 440 mg in sodium chloride 0.9% 522 mL chemo infusion       leucovorin calcium 400 mg/m2 = 1,010 mg in dextrose 5 % (D5W) 250 mL infusion       fluorouracil (ADRUCIL) 2,400 mg/m2 = 6,050 mg in sodium chloride 0.9% 240 mL chemo infusion       Supportive Care       atropine injection 0.4 mg  6/19/2023       Pre-Medications       LORazepam injection 1 mg       promethazine (PHENERGAN) 6.25 mg in dextrose 5 % (D5W) 100 mL IVPB       palonosetron 0.25mg/dexAMETHasone 20mg in NS IVPB       Chemotherapy       bevacizumab-awwb (MVASI) 5 mg/kg = 680 mg in sodium chloride 0.9% 100 mL infusion       irinotecan (CAMPTOSAR) 440 mg in sodium chloride 0.9% 522 mL chemo infusion       leucovorin calcium 400 mg/m2 = 1,010 mg in dextrose 5 % (D5W) 250 mL infusion       fluorouracil (ADRUCIL) 2,400 mg/m2 = 6,050 mg in sodium chloride 0.9% 240 mL chemo infusion       Supportive Care       atropine injection 0.4 mg  7/3/2023       Pre-Medications       LORazepam injection 1 mg       promethazine (PHENERGAN) 6.25 mg in dextrose 5 % (D5W) 100 mL IVPB       palonosetron 0.25mg/dexAMETHasone 20mg in NS IVPB       Chemotherapy       bevacizumab-awwb (MVASI) 5 mg/kg = 680 mg in sodium chloride 0.9% 100 mL infusion        irinotecan (CAMPTOSAR) 440 mg in sodium chloride 0.9% 522 mL chemo infusion       leucovorin calcium 400 mg/m2 = 1,010 mg in dextrose 5 % (D5W) 250 mL infusion       fluorouracil (ADRUCIL) 2,400 mg/m2 = 6,050 mg in sodium chloride 0.9% 240 mL chemo infusion       Supportive Care       atropine injection 0.4 mg    Supportive Plan Information  IV FLUIDS AND ELECTROLYTES   Brayden Solo MD   Upcoming Treatment Dates - IV FLUIDS AND ELECTROLYTES    No upcoming days in selected categories.    Therapy Plan Information  PORT FLUSH  Flushes  heparin, porcine (PF) 100 unit/mL injection flush 500 Units  500 Units, Intravenous, Every visit  sodium chloride 0.9% flush 10 mL  10 mL, Intravenous, Every visit

## 2023-06-06 ENCOUNTER — PATIENT MESSAGE (OUTPATIENT)
Dept: DERMATOLOGY | Facility: CLINIC | Age: 53
End: 2023-06-06
Payer: MEDICAID

## 2023-06-06 RX ORDER — LORAZEPAM 1 MG/1
1 TABLET ORAL EVERY 12 HOURS PRN
Qty: 30 TABLET | Refills: 0 | Status: SHIPPED | OUTPATIENT
Start: 2023-06-06 | End: 2023-06-19 | Stop reason: SDUPTHER

## 2023-06-07 ENCOUNTER — INFUSION (OUTPATIENT)
Dept: INFUSION THERAPY | Facility: HOSPITAL | Age: 53
End: 2023-06-07
Attending: INTERNAL MEDICINE
Payer: MEDICAID

## 2023-06-07 ENCOUNTER — DOCUMENTATION ONLY (OUTPATIENT)
Dept: INFUSION THERAPY | Facility: HOSPITAL | Age: 53
End: 2023-06-07
Payer: MEDICAID

## 2023-06-07 VITALS
TEMPERATURE: 98 F | HEART RATE: 79 BPM | SYSTOLIC BLOOD PRESSURE: 118 MMHG | RESPIRATION RATE: 18 BRPM | BODY MASS INDEX: 45.7 KG/M2 | HEIGHT: 66 IN | DIASTOLIC BLOOD PRESSURE: 79 MMHG | WEIGHT: 284.38 LBS

## 2023-06-07 DIAGNOSIS — C18.7 MALIGNANT NEOPLASM OF SIGMOID COLON: ICD-10-CM

## 2023-06-07 DIAGNOSIS — C77.2 METASTASIS TO INTESTINAL LYMPH NODE: Primary | ICD-10-CM

## 2023-06-07 PROCEDURE — 96361 HYDRATE IV INFUSION ADD-ON: CPT | Mod: PN

## 2023-06-07 PROCEDURE — 25000003 PHARM REV CODE 250: Mod: PN | Performed by: NURSE PRACTITIONER

## 2023-06-07 PROCEDURE — 96360 HYDRATION IV INFUSION INIT: CPT | Mod: PN

## 2023-06-07 PROCEDURE — 63600175 PHARM REV CODE 636 W HCPCS: Mod: PN | Performed by: NURSE PRACTITIONER

## 2023-06-07 RX ORDER — SODIUM CHLORIDE 9 MG/ML
1000 INJECTION, SOLUTION INTRAVENOUS
Status: DISCONTINUED | OUTPATIENT
Start: 2023-06-07 | End: 2023-06-07 | Stop reason: HOSPADM

## 2023-06-07 RX ORDER — HEPARIN 100 UNIT/ML
500 SYRINGE INTRAVENOUS
Status: DISCONTINUED | OUTPATIENT
Start: 2023-06-07 | End: 2023-06-07 | Stop reason: HOSPADM

## 2023-06-07 RX ADMIN — SODIUM CHLORIDE 1000 ML: 9 INJECTION, SOLUTION INTRAVENOUS at 10:06

## 2023-06-07 RX ADMIN — Medication 500 UNITS: at 12:06

## 2023-06-07 NOTE — PROGRESS NOTES
Oncology Nutrition   Gail Mckinnon   1970    [employer]     Pt eligible for [employer] . Order filled out with patient at chairside. All patient questions or concerns regarding  and/or nutrition status addressed. RD to continue to monitor prn and provide patient food while under active treatment.     Food order filled by this RD- will provide to patient at end of infusion today.    Ciera Richardson, DAVIDN, LDN  06/07/2023  1:31 PM

## 2023-06-07 NOTE — PLAN OF CARE
Pt arrived to clinic today for pump d/c and IVF infusion and tolerated well. No changes throughout therapy. Pt aware of follow up appointments and side effects of drugs. Discharged to home. NAD.

## 2023-06-08 ENCOUNTER — PATIENT MESSAGE (OUTPATIENT)
Dept: PSYCHIATRY | Facility: CLINIC | Age: 53
End: 2023-06-08
Payer: MEDICAID

## 2023-06-19 ENCOUNTER — OFFICE VISIT (OUTPATIENT)
Dept: PSYCHIATRY | Facility: CLINIC | Age: 53
End: 2023-06-19
Payer: MEDICAID

## 2023-06-19 ENCOUNTER — INFUSION (OUTPATIENT)
Dept: INFUSION THERAPY | Facility: HOSPITAL | Age: 53
End: 2023-06-19
Attending: INTERNAL MEDICINE
Payer: MEDICAID

## 2023-06-19 ENCOUNTER — OFFICE VISIT (OUTPATIENT)
Dept: HEMATOLOGY/ONCOLOGY | Facility: CLINIC | Age: 53
End: 2023-06-19
Payer: MEDICAID

## 2023-06-19 VITALS
SYSTOLIC BLOOD PRESSURE: 120 MMHG | OXYGEN SATURATION: 99 % | RESPIRATION RATE: 16 BRPM | HEART RATE: 84 BPM | WEIGHT: 284.19 LBS | BODY MASS INDEX: 45.67 KG/M2 | TEMPERATURE: 97 F | DIASTOLIC BLOOD PRESSURE: 79 MMHG | HEIGHT: 66 IN

## 2023-06-19 VITALS
WEIGHT: 284.19 LBS | OXYGEN SATURATION: 99 % | HEART RATE: 82 BPM | TEMPERATURE: 97 F | BODY MASS INDEX: 45.67 KG/M2 | DIASTOLIC BLOOD PRESSURE: 70 MMHG | RESPIRATION RATE: 16 BRPM | SYSTOLIC BLOOD PRESSURE: 112 MMHG | HEIGHT: 66 IN

## 2023-06-19 DIAGNOSIS — T45.1X5A CHEMOTHERAPY-INDUCED NEUTROPENIA: ICD-10-CM

## 2023-06-19 DIAGNOSIS — B00.1 HERPES LABIALIS: ICD-10-CM

## 2023-06-19 DIAGNOSIS — I10 HYPERTENSION, UNSPECIFIED TYPE: ICD-10-CM

## 2023-06-19 DIAGNOSIS — E07.9 DISORDER OF THYROID: ICD-10-CM

## 2023-06-19 DIAGNOSIS — E04.1 THYROID NODULE: ICD-10-CM

## 2023-06-19 DIAGNOSIS — C18.7 MALIGNANT NEOPLASM OF SIGMOID COLON: ICD-10-CM

## 2023-06-19 DIAGNOSIS — E83.52 HYPERCALCEMIA: ICD-10-CM

## 2023-06-19 DIAGNOSIS — C77.9 SECONDARY ADENOCARCINOMA OF LYMPH NODE: ICD-10-CM

## 2023-06-19 DIAGNOSIS — C77.2 METASTASIS TO INTESTINAL LYMPH NODE: Primary | ICD-10-CM

## 2023-06-19 DIAGNOSIS — F33.1 MODERATE EPISODE OF RECURRENT MAJOR DEPRESSIVE DISORDER: Primary | ICD-10-CM

## 2023-06-19 DIAGNOSIS — C80.1 ANXIETY ASSOCIATED WITH CANCER DIAGNOSIS: ICD-10-CM

## 2023-06-19 DIAGNOSIS — R11.0 NAUSEA: ICD-10-CM

## 2023-06-19 DIAGNOSIS — D70.1 CHEMOTHERAPY-INDUCED NEUTROPENIA: ICD-10-CM

## 2023-06-19 DIAGNOSIS — C77.2 METASTASIS TO INTESTINAL LYMPH NODE: ICD-10-CM

## 2023-06-19 DIAGNOSIS — K12.31 MUCOSITIS DUE TO CHEMOTHERAPY: ICD-10-CM

## 2023-06-19 DIAGNOSIS — C18.7 MALIGNANT NEOPLASM OF SIGMOID COLON: Primary | ICD-10-CM

## 2023-06-19 DIAGNOSIS — F41.1 ANXIETY ASSOCIATED WITH CANCER DIAGNOSIS: ICD-10-CM

## 2023-06-19 DIAGNOSIS — R74.01 TRANSAMINITIS: ICD-10-CM

## 2023-06-19 DIAGNOSIS — F41.9 ANXIETY: ICD-10-CM

## 2023-06-19 PROCEDURE — 3008F BODY MASS INDEX DOCD: CPT | Mod: CPTII,,, | Performed by: INTERNAL MEDICINE

## 2023-06-19 PROCEDURE — 90832 PSYTX W PT 30 MINUTES: CPT | Mod: AH,HB,, | Performed by: PSYCHOLOGIST

## 2023-06-19 PROCEDURE — 25000003 PHARM REV CODE 250: Mod: PN | Performed by: INTERNAL MEDICINE

## 2023-06-19 PROCEDURE — 96413 CHEMO IV INFUSION 1 HR: CPT | Mod: PN

## 2023-06-19 PROCEDURE — 4010F ACE/ARB THERAPY RXD/TAKEN: CPT | Mod: CPTII,,, | Performed by: INTERNAL MEDICINE

## 2023-06-19 PROCEDURE — 96368 THER/DIAG CONCURRENT INF: CPT | Mod: PN

## 2023-06-19 PROCEDURE — 96416 CHEMO PROLONG INFUSE W/PUMP: CPT | Mod: PN

## 2023-06-19 PROCEDURE — 3078F DIAST BP <80 MM HG: CPT | Mod: CPTII,,, | Performed by: INTERNAL MEDICINE

## 2023-06-19 PROCEDURE — 3074F SYST BP LT 130 MM HG: CPT | Mod: CPTII,,, | Performed by: INTERNAL MEDICINE

## 2023-06-19 PROCEDURE — 99215 PR OFFICE/OUTPT VISIT, EST, LEVL V, 40-54 MIN: ICD-10-PCS | Mod: S$PBB,,, | Performed by: INTERNAL MEDICINE

## 2023-06-19 PROCEDURE — 3074F PR MOST RECENT SYSTOLIC BLOOD PRESSURE < 130 MM HG: ICD-10-PCS | Mod: CPTII,,, | Performed by: INTERNAL MEDICINE

## 2023-06-19 PROCEDURE — 3078F PR MOST RECENT DIASTOLIC BLOOD PRESSURE < 80 MM HG: ICD-10-PCS | Mod: CPTII,,, | Performed by: INTERNAL MEDICINE

## 2023-06-19 PROCEDURE — 4010F PR ACE/ARB THEARPY RXD/TAKEN: ICD-10-PCS | Mod: CPTII,,, | Performed by: INTERNAL MEDICINE

## 2023-06-19 PROCEDURE — 4010F ACE/ARB THERAPY RXD/TAKEN: CPT | Mod: CPTII,,, | Performed by: PSYCHOLOGIST

## 2023-06-19 PROCEDURE — 4010F PR ACE/ARB THEARPY RXD/TAKEN: ICD-10-PCS | Mod: CPTII,,, | Performed by: PSYCHOLOGIST

## 2023-06-19 PROCEDURE — 3008F PR BODY MASS INDEX (BMI) DOCUMENTED: ICD-10-PCS | Mod: CPTII,,, | Performed by: INTERNAL MEDICINE

## 2023-06-19 PROCEDURE — 63600175 PHARM REV CODE 636 W HCPCS: Mod: PN | Performed by: INTERNAL MEDICINE

## 2023-06-19 PROCEDURE — 90832 PR PSYCHOTHERAPY W/PATIENT, 30 MIN: ICD-10-PCS | Mod: AH,HB,, | Performed by: PSYCHOLOGIST

## 2023-06-19 PROCEDURE — 99999 PR PBB SHADOW E&M-EST. PATIENT-LVL IV: ICD-10-PCS | Mod: PBBFAC,,, | Performed by: INTERNAL MEDICINE

## 2023-06-19 PROCEDURE — 96367 TX/PROPH/DG ADDL SEQ IV INF: CPT | Mod: PN

## 2023-06-19 PROCEDURE — 99215 OFFICE O/P EST HI 40 MIN: CPT | Mod: S$PBB,,, | Performed by: INTERNAL MEDICINE

## 2023-06-19 PROCEDURE — 96417 CHEMO IV INFUS EACH ADDL SEQ: CPT | Mod: PN

## 2023-06-19 PROCEDURE — 99999 PR PBB SHADOW E&M-EST. PATIENT-LVL IV: CPT | Mod: PBBFAC,,, | Performed by: INTERNAL MEDICINE

## 2023-06-19 PROCEDURE — 99214 OFFICE O/P EST MOD 30 MIN: CPT | Mod: PBBFAC,PN,25 | Performed by: INTERNAL MEDICINE

## 2023-06-19 PROCEDURE — 96375 TX/PRO/DX INJ NEW DRUG ADDON: CPT | Mod: PN

## 2023-06-19 RX ORDER — LORAZEPAM 2 MG/ML
1 INJECTION INTRAMUSCULAR
Status: CANCELLED | OUTPATIENT
Start: 2023-06-19 | End: 2023-06-19

## 2023-06-19 RX ORDER — HEPARIN 100 UNIT/ML
500 SYRINGE INTRAVENOUS
Status: CANCELLED | OUTPATIENT
Start: 2023-06-21

## 2023-06-19 RX ORDER — HEPARIN 100 UNIT/ML
500 SYRINGE INTRAVENOUS
Status: CANCELLED | OUTPATIENT
Start: 2023-06-19

## 2023-06-19 RX ORDER — SODIUM CHLORIDE 9 MG/ML
1000 INJECTION, SOLUTION INTRAVENOUS
Status: CANCELLED | OUTPATIENT
Start: 2023-06-21

## 2023-06-19 RX ORDER — ATROPINE SULFATE 0.4 MG/ML
0.4 INJECTION, SOLUTION ENDOTRACHEAL; INTRAMEDULLARY; INTRAMUSCULAR; INTRAVENOUS; SUBCUTANEOUS ONCE AS NEEDED
Status: CANCELLED | OUTPATIENT
Start: 2023-06-19

## 2023-06-19 RX ORDER — HEPARIN 100 UNIT/ML
500 SYRINGE INTRAVENOUS
Status: DISCONTINUED | OUTPATIENT
Start: 2023-06-19 | End: 2023-06-19 | Stop reason: HOSPADM

## 2023-06-19 RX ORDER — LORAZEPAM 1 MG/1
1 TABLET ORAL EVERY 12 HOURS PRN
Qty: 30 TABLET | Refills: 0 | Status: SHIPPED | OUTPATIENT
Start: 2023-06-19 | End: 2023-08-07 | Stop reason: SDUPTHER

## 2023-06-19 RX ORDER — SODIUM CHLORIDE 0.9 % (FLUSH) 0.9 %
10 SYRINGE (ML) INJECTION
Status: CANCELLED | OUTPATIENT
Start: 2023-06-19

## 2023-06-19 RX ORDER — SODIUM CHLORIDE 0.9 % (FLUSH) 0.9 %
10 SYRINGE (ML) INJECTION
Status: CANCELLED | OUTPATIENT
Start: 2023-06-21

## 2023-06-19 RX ORDER — LORAZEPAM 2 MG/ML
1 INJECTION INTRAMUSCULAR
Status: COMPLETED | OUTPATIENT
Start: 2023-06-19 | End: 2023-06-19

## 2023-06-19 RX ORDER — ACETAMINOPHEN 325 MG/1
650 TABLET ORAL ONCE
Status: COMPLETED | OUTPATIENT
Start: 2023-06-19 | End: 2023-06-19

## 2023-06-19 RX ORDER — ATROPINE SULFATE 0.4 MG/ML
0.4 INJECTION, SOLUTION ENDOTRACHEAL; INTRAMEDULLARY; INTRAMUSCULAR; INTRAVENOUS; SUBCUTANEOUS ONCE AS NEEDED
Status: COMPLETED | OUTPATIENT
Start: 2023-06-19 | End: 2023-06-19

## 2023-06-19 RX ADMIN — LEUCOVORIN CALCIUM 1010 MG: 350 INJECTION, POWDER, LYOPHILIZED, FOR SOLUTION INTRAMUSCULAR; INTRAVENOUS at 12:06

## 2023-06-19 RX ADMIN — SODIUM CHLORIDE: 9 INJECTION, SOLUTION INTRAVENOUS at 10:06

## 2023-06-19 RX ADMIN — LORAZEPAM 1 MG: 2 INJECTION INTRAMUSCULAR; INTRAVENOUS at 11:06

## 2023-06-19 RX ADMIN — PALONOSETRON 0.25 MG: 0.05 INJECTION, SOLUTION INTRAVENOUS at 10:06

## 2023-06-19 RX ADMIN — SODIUM CHLORIDE 440 MG: 9 INJECTION, SOLUTION INTRAVENOUS at 12:06

## 2023-06-19 RX ADMIN — BEVACIZUMAB-AWWB 680 MG: 400 INJECTION, SOLUTION INTRAVENOUS at 12:06

## 2023-06-19 RX ADMIN — PROMETHAZINE HYDROCHLORIDE 6.25 MG: 25 INJECTION INTRAMUSCULAR; INTRAVENOUS at 11:06

## 2023-06-19 RX ADMIN — ATROPINE SULFATE 0.4 MG: 0.4 INJECTION, SOLUTION INTRAVENOUS at 12:06

## 2023-06-19 RX ADMIN — FLUOROURACIL 6000 MG: 50 INJECTION, SOLUTION INTRAVENOUS at 02:06

## 2023-06-19 RX ADMIN — ACETAMINOPHEN 650 MG: 325 TABLET, FILM COATED ORAL at 11:06

## 2023-06-19 NOTE — PROGRESS NOTES
PROGRESS NOTE    Subjective:       Patient ID: Gail Mckinnon is a 53 y.o. female.  MRN: [id number]  : 1970    Chief Complaint: Malignant neoplasm of sigmoid colon (Follow up, Labs on , tx today)      History of Present Illness:   Gail Mckinnon is a 53 y.o. female who presents with colon cancer, initially stage III and now with LN recurrence.    On FOLIRI+ Avastin sine .     She was briefly switched to xeloda due to 5 FU shortage for a month. Did not tolerate Xeloda well due hand foot syndrome, blistering of feet, did not take the last day of Xeloda. Completed .     Resumed Folfiri + Avastin on 23. Atropine was held due to prior constipation.     Was admitted to the hospital from -23 for intractable nausea , vomiting and diarrhea as well as abdominal pain. No hematochezia or dark stool was noted.      US showed gallbladder stone and dilated CBD. She was managed conservatively. Had CT abd, pelvis, colonoscopy and MRCP that were relatively unremarkable without signs of cholecystitis. Cholecystectomy was not recommended.     She had recently resumed FOLFIRI and the symptoms started after the first cycle of resuming, never had issues prior to this.  Stayed in the hospital for 10 days.  C diff was negative. No other etiology was established. Symptoms improved over time and she was discharged on .     Interim history:  Here to obtain clearance for ongoing FOLFIRI+Avastin.   Tolerated the most recent cycle relatively well. Sancuso patch is helping with nausea.   Has noticed left flank pain and bladder spasms for the past one week. Intermittent, denies hematuria or dysuria.     Oncology History:  She completed adjuvant FOLFOX in 2020. She tolerated only 4 cycles of FOLFOX before she developed an infusion reaction to oxaliplatin in cycle 5 was well as cycle 6. She then completed 6 cycles of infusional 5 FU.     In  May 2021, restaging scans were consistent with RP toni metastasis. She was offered second line therapy with FOLFIRI and Avastin.      She presented to the ED on 11/26 with left flank pain. CT renal stone study showed Moderate hydronephrosis on the left secondary to 3 mm calculus at the UPJ.    She had a PET scan mid April that showed possible progression of her disease with      She has been to [facility] for another opinion. No change was recommended in systemic therapy but she was offered surgical removal of the aorta caval nodes.  Recent sans at [facility]  show stable disease.      Surgery done 7/12/22. Received 2 more cycle of FOLFIRI without Avastin.     She had repeat imaging at [facility] on 9/24/22 that unfortunately showed disease progression since her surgery with multiple RP nodes and one mediastinal node.       PET 12/8/22  Impression:     1. Progression of disease with interval increase of abdominal and pelvic lymphadenopathy.  2. New area of moderate FDG uptake at the right half of the T9 vertebral body (image 124).  Favor degenerative disc changes.  No underlying osteolytic or osteoblastic lesion.  Recommend continued attention on follow-up.  3. No other evidence of FDG avid disease.     12/22/22  Impression After scan comparison:     1. Metastatic portacaval and left periaortic retroperitoneal lymph nodes which remain stable between the CT of 09/24/2022 and the subsequent PET-CT of 12/08/2022.  There is no evidence of progression of metastatic disease.  2. Nonobstructing bilateral renal calculi and cholelithiasis.    2/10/22:  CT chest abd pelvis  Impression:     Stable periportal, retroperitoneal and mesenteric lymphadenopathy compared to PET-CT 12/08/2022.     Stable right middle lobe 3 mm pulmonary nodule.  No new or enlarging pulmonary nodule.     Hepatic steatosis.  Hepatosplenomegaly.     Cholelithiasis.     Bilateral nonobstructing nephrolithiasis.     Small hiatal hernia with retained contrast in the distal  esophagus.  Correlate for reflux.    She had virtual visit at [facility] on 2/17/23.     She has continued FOLFIRI and Avastin.     CT abd pelvis   5/7/23  Impression:     1. Mesenteric inflammatory changes have worsened since the prior study.  2. Mesenteric, retroperitoneal and left-sided pelvic enlarged lymph nodes are unchanged.       Oncology History:  Oncology History   Malignant neoplasm of sigmoid colon   3/16/2020 Initial Diagnosis    Malignant neoplasm of sigmoid colon     3/31/2020 Cancer Staged    Staging form: Colon and Rectum, AJCC 8th Edition  - Clinical stage from 3/31/2020: Stage IIIC (cT4b, cN2a, cM0)     5/6/2020 - 11/17/2020 Chemotherapy    Treatment Summary   Plan Name: OP FOLFOX 6 Q2W  Treatment Goal: Curative  Status: Inactive  Start Date: 5/6/2020  End Date: 11/6/2020  Provider: Dylan Leyva MD  Chemotherapy: fluorouraciL injection 945 mg, 400 mg/m2 = 945 mg, Intravenous, Clinic/[facility] 1 time, 14 of 14 cycles  Administration: 945 mg (5/6/2020), 945 mg (5/20/2020), 945 mg (6/3/2020), 945 mg (6/17/2020), 945 mg (7/29/2020), 945 mg (8/12/2020), 945 mg (8/26/2020), 945 mg (9/9/2020), 945 mg (9/23/2020), 945 mg (10/6/2020), 945 mg (10/21/2020), 945 mg (11/4/2020)  fluorouraciL 2,400 mg/m2 = 5,665 mg in sodium chloride 0.9% 240 mL chemo infusion, 2,400 mg/m2 = 5,665 mg, Intravenous, Over 46 hours, 14 of 14 cycles  Administration: 5,665 mg (5/6/2020), 5,665 mg (5/20/2020), 5,665 mg (6/3/2020), 5,665 mg (6/17/2020), 5,665 mg (7/29/2020), 5,665 mg (8/12/2020), 5,665 mg (8/26/2020), 5,665 mg (9/9/2020), 5,665 mg (9/23/2020), 5,665 mg (10/6/2020), 5,665 mg (10/21/2020), 5,665 mg (11/4/2020)  leucovorin calcium 900 mg in dextrose 5 % 250 mL infusion, 945 mg, Intravenous, [facility] 1 time, 14 of 14 cycles  Administration: 900 mg (5/6/2020), 900 mg (5/20/2020), 900 mg (6/3/2020), 900 mg (6/17/2020), 945 mg (6/30/2020), 945 mg (7/20/2020), 945 mg (7/29/2020), 945 mg (8/12/2020), 945 mg (8/26/2020), 945 mg  (9/9/2020), 945 mg (9/23/2020), 945 mg (10/6/2020), 945 mg (10/21/2020), 945 mg (11/4/2020)  oxaliplatin (ELOXATIN) 200 mg in dextrose 5 % 500 mL chemo infusion, 201 mg, Intravenous, Clinic/[facility] 1 time, 6 of 6 cycles  Dose modification: 65 mg/m2 (original dose 85 mg/m2, Cycle 6)  Administration: 200 mg (5/6/2020), 200 mg (5/20/2020), 200 mg (6/3/2020), 200 mg (6/17/2020), 201 mg (6/30/2020), 150 mg (7/20/2020)     6/16/2021 -  Chemotherapy    Treatment Summary   Plan Name: OP COLORECTAL FOLFIRI + BEVACIZUMAB Q2W  Treatment Goal: Control  Status: Active  Start Date: 6/16/2021  End Date: 7/5/2023 (Planned)  Provider: Marah Santo MD  Chemotherapy: fluorouraciL injection 990 mg, 400 mg/m2 = 990 mg, Intravenous, [facility] 1 time, 15 of 15 cycles  Administration: 990 mg (6/16/2021), 990 mg (6/30/2021), 990 mg (7/14/2021), 980 mg (7/28/2021), 990 mg (8/11/2021), 990 mg (8/25/2021), 990 mg (9/8/2021), 990 mg (9/22/2021), 990 mg (10/6/2021), 990 mg (10/20/2021), 990 mg (11/3/2021), 990 mg (12/1/2021), 990 mg (12/15/2021), 990 mg (11/17/2021), 990 mg (12/28/2021)  fluorouraciL 2,400 mg/m2 = 5,950 mg in sodium chloride 0.9% 240 mL chemo infusion, 2,400 mg/m2 = 5,950 mg, Intravenous, Over 46 hours, 40 of 42 cycles  Administration: 5,950 mg (6/16/2021), 5,950 mg (6/30/2021), 5,950 mg (7/14/2021), 5,880 mg (7/28/2021), 5,930 mg (8/11/2021), 5,930 mg (8/25/2021), 5,930 mg (9/8/2021), 5,930 mg (9/22/2021), 5,930 mg (10/6/2021), 5,930 mg (10/20/2021), 5,930 mg (11/3/2021), 5,930 mg (12/1/2021), 5,930 mg (12/15/2021), 5,930 mg (11/17/2021), 5,930 mg (12/28/2021), 6,050 mg (5/11/2022), 6,025 mg (3/9/2022), 6,025 mg (2/23/2022), 5,930 mg (2/2/2022), 5,930 mg (1/19/2022), 6,050 mg (4/20/2022), 6,025 mg (4/6/2022), 6,025 mg (3/23/2022), 6,050 mg (6/1/2022), 6,050 mg (6/15/2022), 6,050 mg (10/19/2022), 6,050 mg (10/5/2022), 6,050 mg (11/2/2022), 6,050 mg (11/16/2022), 6,050 mg (11/30/2022), 6,050 mg (1/4/2023), 6,050 mg (12/19/2022),  6,050 mg (1/18/2023), 6,000 mg (5/22/2023), 6,000 mg (4/26/2023), 6,000 mg (4/11/2023), 6,000 mg (2/28/2023), 6,050 mg (2/14/2023), 6,000 mg (2/1/2023), 6,000 mg (6/5/2023)  bevacizumab (AVASTIN) 600 mg in sodium chloride 0.9% 100 mL chemo infusion, 660 mg, Intravenous, [facility] 1 time, 36 of 36 cycles  Dose modification: 5 mg/kg (original dose 5 mg/kg, Cycle 26, Reason: MD Discretion, Comment: 5 mg/kg original dose)  Administration: 600 mg (6/30/2021), 600 mg (7/14/2021), 600 mg (7/28/2021), 600 mg (6/16/2021), 600 mg (8/11/2021), 655 mg (8/25/2021), 600 mg (9/8/2021), 600 mg (9/22/2021), 600 mg (10/6/2021), 600 mg (10/20/2021), 600 mg (11/3/2021), 655 mg (12/1/2021), 600 mg (12/15/2021), 600 mg (11/17/2021), 600 mg (12/28/2021), 600 mg (5/11/2022), 600 mg (3/9/2022), 600 mg (2/23/2022), 600 mg (2/2/2022), 600 mg (1/19/2022), 600 mg (4/20/2022), 680 mg (4/6/2022), 600 mg (3/23/2022), 680 mg (10/5/2022), 680 mg (10/19/2022), 680 mg (11/2/2022), 680 mg (11/16/2022), 680 mg (11/30/2022), 680 mg (1/4/2023), 680 mg (12/19/2022), 680 mg (1/18/2023), 680 mg (3/28/2023), 680 mg (3/14/2023), 680 mg (2/28/2023), 680 mg (2/14/2023), 680 mg (2/1/2023)  irinotecan (CAMPTOSAR) 440 mg in sodium chloride 0.9% 500 mL chemo infusion, 446 mg, Intravenous, [facility] 1 time, 42 of 44 cycles  Dose modification: 180 mg/m2 (original dose 180 mg/m2, Cycle 24)  Administration: 440 mg (6/16/2021), 440 mg (6/30/2021), 440 mg (7/14/2021), 440 mg (7/28/2021), 440 mg (8/11/2021), 444 mg (8/25/2021), 440 mg (9/8/2021), 440 mg (9/22/2021), 440 mg (10/6/2021), 440 mg (10/20/2021), 440 mg (11/3/2021), 440 mg (12/1/2021), 440 mg (12/15/2021), 440 mg (11/17/2021), 440 mg (12/28/2021), 440 mg (5/11/2022), 440 mg (3/9/2022), 440 mg (2/23/2022), 440 mg (2/2/2022), 440 mg (1/19/2022), 440 mg (4/20/2022), 440 mg (4/6/2022), 440 mg (3/24/2022), 440 mg (6/1/2022), 440 mg (6/15/2022), 440 mg (10/19/2022), 440 mg (10/5/2022), 440 mg (11/2/2022), 440 mg  (11/16/2022), 440 mg (11/30/2022), 440 mg (1/4/2023), 440 mg (12/19/2022), 440 mg (1/18/2023), 440 mg (5/22/2023), 440 mg (4/26/2023), 440 mg (4/11/2023), 440 mg (3/28/2023), 440 mg (3/14/2023), 440 mg (2/28/2023), 440 mg (2/14/2023), 440 mg (2/1/2023), 440 mg (6/5/2023)  bevacizumab-awwb (MVASI) 5 mg/kg = 680 mg in sodium chloride 0.9% 100 mL infusion, 5 mg/kg = 680 mg (100 % of original dose 5 mg/kg), Intravenous, [facility] 1 time, 4 of 6 cycles  Dose modification: 5 mg/kg (original dose 5 mg/kg, Cycle 39)  Administration: 680 mg (4/11/2023), 680 mg (4/26/2023), 680 mg (5/22/2023), 680 mg (6/5/2023)     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   4/3/2020 Initial Diagnosis    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     5/6/2020 - 11/17/2020 Chemotherapy    Treatment Summary   Plan Name: OP FOLFOX 6 Q2W  Treatment Goal: Curative  Status: Inactive  Start Date: 5/6/2020  End Date: 11/6/2020  Provider: Dylan Leyva MD  Chemotherapy: fluorouraciL injection 945 mg, 400 mg/m2 = 945 mg, Intravenous, [facility] 1 time, 14 of 14 cycles  Administration: 945 mg (5/6/2020), 945 mg (5/20/2020), 945 mg (6/3/2020), 945 mg (6/17/2020), 945 mg (7/29/2020), 945 mg (8/12/2020), 945 mg (8/26/2020), 945 mg (9/9/2020), 945 mg (9/23/2020), 945 mg (10/6/2020), 945 mg (10/21/2020), 945 mg (11/4/2020)  fluorouraciL 2,400 mg/m2 = 5,665 mg in sodium chloride 0.9% 240 mL chemo infusion, 2,400 mg/m2 = 5,665 mg, Intravenous, Over 46 hours, 14 of 14 cycles  Administration: 5,665 mg (5/6/2020), 5,665 mg (5/20/2020), 5,665 mg (6/3/2020), 5,665 mg (6/17/2020), 5,665 mg (7/29/2020), 5,665 mg (8/12/2020), 5,665 mg (8/26/2020), 5,665 mg (9/9/2020), 5,665 mg (9/23/2020), 5,665 mg (10/6/2020), 5,665 mg (10/21/2020), 5,665 mg (11/4/2020)  leucovorin calcium 900 mg in dextrose 5 % 250 mL infusion, 945 mg, Intravenous, Clinic/[facility] 1 time, 14 of 14 cycles  Administration: 900 mg (5/6/2020), 900 mg (5/20/2020), 900 mg (6/3/2020), 900 mg (6/17/2020), 945 mg  (6/30/2020), 945 mg (7/20/2020), 945 mg (7/29/2020), 945 mg (8/12/2020), 945 mg (8/26/2020), 945 mg (9/9/2020), 945 mg (9/23/2020), 945 mg (10/6/2020), 945 mg (10/21/2020), 945 mg (11/4/2020)  oxaliplatin (ELOXATIN) 200 mg in dextrose 5 % 500 mL chemo infusion, 201 mg, Intravenous, [facility] 1 time, 6 of 6 cycles  Dose modification: 65 mg/m2 (original dose 85 mg/m2, Cycle 6)  Administration: 200 mg (5/6/2020), 200 mg (5/20/2020), 200 mg (6/3/2020), 200 mg (6/17/2020), 201 mg (6/30/2020), 150 mg (7/20/2020)     6/16/2021 -  Chemotherapy    Treatment Summary   Plan Name: OP COLORECTAL FOLFIRI + BEVACIZUMAB Q2W  Treatment Goal: Control  Status: Active  Start Date: 6/16/2021  End Date: 7/5/2023 (Planned)  Provider: Marah Santo MD  Chemotherapy: fluorouraciL injection 990 mg, 400 mg/m2 = 990 mg, Intravenous, Clinic/[facility] 1 time, 15 of 15 cycles  Administration: 990 mg (6/16/2021), 990 mg (6/30/2021), 990 mg (7/14/2021), 980 mg (7/28/2021), 990 mg (8/11/2021), 990 mg (8/25/2021), 990 mg (9/8/2021), 990 mg (9/22/2021), 990 mg (10/6/2021), 990 mg (10/20/2021), 990 mg (11/3/2021), 990 mg (12/1/2021), 990 mg (12/15/2021), 990 mg (11/17/2021), 990 mg (12/28/2021)  fluorouraciL 2,400 mg/m2 = 5,950 mg in sodium chloride 0.9% 240 mL chemo infusion, 2,400 mg/m2 = 5,950 mg, Intravenous, Over 46 hours, 40 of 42 cycles  Administration: 5,950 mg (6/16/2021), 5,950 mg (6/30/2021), 5,950 mg (7/14/2021), 5,880 mg (7/28/2021), 5,930 mg (8/11/2021), 5,930 mg (8/25/2021), 5,930 mg (9/8/2021), 5,930 mg (9/22/2021), 5,930 mg (10/6/2021), 5,930 mg (10/20/2021), 5,930 mg (11/3/2021), 5,930 mg (12/1/2021), 5,930 mg (12/15/2021), 5,930 mg (11/17/2021), 5,930 mg (12/28/2021), 6,050 mg (5/11/2022), 6,025 mg (3/9/2022), 6,025 mg (2/23/2022), 5,930 mg (2/2/2022), 5,930 mg (1/19/2022), 6,050 mg (4/20/2022), 6,025 mg (4/6/2022), 6,025 mg (3/23/2022), 6,050 mg (6/1/2022), 6,050 mg (6/15/2022), 6,050 mg (10/19/2022), 6,050 mg (10/5/2022), 6,050 mg  (11/2/2022), 6,050 mg (11/16/2022), 6,050 mg (11/30/2022), 6,050 mg (1/4/2023), 6,050 mg (12/19/2022), 6,050 mg (1/18/2023), 6,000 mg (5/22/2023), 6,000 mg (4/26/2023), 6,000 mg (4/11/2023), 6,000 mg (2/28/2023), 6,050 mg (2/14/2023), 6,000 mg (2/1/2023), 6,000 mg (6/5/2023)  bevacizumab (AVASTIN) 600 mg in sodium chloride 0.9% 100 mL chemo infusion, 660 mg, Intravenous, Clinic/[facility] 1 time, 36 of 36 cycles  Dose modification: 5 mg/kg (original dose 5 mg/kg, Cycle 26, Reason: MD Discretion, Comment: 5 mg/kg original dose)  Administration: 600 mg (6/30/2021), 600 mg (7/14/2021), 600 mg (7/28/2021), 600 mg (6/16/2021), 600 mg (8/11/2021), 655 mg (8/25/2021), 600 mg (9/8/2021), 600 mg (9/22/2021), 600 mg (10/6/2021), 600 mg (10/20/2021), 600 mg (11/3/2021), 655 mg (12/1/2021), 600 mg (12/15/2021), 600 mg (11/17/2021), 600 mg (12/28/2021), 600 mg (5/11/2022), 600 mg (3/9/2022), 600 mg (2/23/2022), 600 mg (2/2/2022), 600 mg (1/19/2022), 600 mg (4/20/2022), 680 mg (4/6/2022), 600 mg (3/23/2022), 680 mg (10/5/2022), 680 mg (10/19/2022), 680 mg (11/2/2022), 680 mg (11/16/2022), 680 mg (11/30/2022), 680 mg (1/4/2023), 680 mg (12/19/2022), 680 mg (1/18/2023), 680 mg (3/28/2023), 680 mg (3/14/2023), 680 mg (2/28/2023), 680 mg (2/14/2023), 680 mg (2/1/2023)  irinotecan (CAMPTOSAR) 440 mg in sodium chloride 0.9% 500 mL chemo infusion, 446 mg, Intravenous, Clinic/[facility] 1 time, 42 of 44 cycles  Dose modification: 180 mg/m2 (original dose 180 mg/m2, Cycle 24)  Administration: 440 mg (6/16/2021), 440 mg (6/30/2021), 440 mg (7/14/2021), 440 mg (7/28/2021), 440 mg (8/11/2021), 444 mg (8/25/2021), 440 mg (9/8/2021), 440 mg (9/22/2021), 440 mg (10/6/2021), 440 mg (10/20/2021), 440 mg (11/3/2021), 440 mg (12/1/2021), 440 mg (12/15/2021), 440 mg (11/17/2021), 440 mg (12/28/2021), 440 mg (5/11/2022), 440 mg (3/9/2022), 440 mg (2/23/2022), 440 mg (2/2/2022), 440 mg (1/19/2022), 440 mg (4/20/2022), 440 mg (4/6/2022), 440 mg (3/24/2022), 440 mg  (2022), 440 mg (6/15/2022), 440 mg (10/19/2022), 440 mg (10/5/2022), 440 mg (2022), 440 mg (2022), 440 mg (2022), 440 mg (2023), 440 mg (2022), 440 mg (2023), 440 mg (2023), 440 mg (2023), 440 mg (2023), 440 mg (3/28/2023), 440 mg (3/14/2023), 440 mg (2023), 440 mg (2023), 440 mg (2023), 440 mg (2023)  bevacizumab-awwb (MVASI) 5 mg/kg = 680 mg in sodium chloride 0.9% 100 mL infusion, 5 mg/kg = 680 mg (100 % of original dose 5 mg/kg), Intravenous, Clinic/[facility] 1 time, 4 of 6 cycles  Dose modification: 5 mg/kg (original dose 5 mg/kg, Cycle 39)  Administration: 680 mg (2023), 680 mg (2023), 680 mg (2023), 680 mg (2023)         History:  Past Medical History:   Diagnosis Date    Anxiety     Depression     FH: ovarian cancer 3/16/2020    Hx of psychiatric care     Effexor, Paxil, Lexapro, Zoloft, Wellbutrin, Trazodone Buspar    Hyperthyroidism     Hypothyroid     Kidney calculi     Malignant neoplasm of sigmoid colon 3/16/2020    Menorrhagia     Multinodular goiter 2012    Palpitation     Psychiatric problem     Venous insufficiency       Past Surgical History:   Procedure Laterality Date     SECTION, CLASSIC      x3    COLONOSCOPY N/A 2020    Procedure: COLONOSCOPY;  Surgeon: Shane Parker MD;  Location: [facility] ENDO;  Service: Endoscopy;  Laterality: N/A;    COLONOSCOPY N/A 2022    Procedure: COLONOSCOPY;  Surgeon: Shane Parker MD;  Location: [facility] ENDO;  Service: Endoscopy;  Laterality: N/A;    COLONOSCOPY N/A 2023    Procedure: COLONOSCOPY;  Surgeon: Shane Parker MD;  Location: [facility];  Service: Endoscopy;  Laterality: N/A;  no cecum anastomosis    CYSTOSCOPY W/ URETERAL STENT PLACEMENT Bilateral 3/25/2020    Procedure: CYSTOSCOPY, WITH URETERAL STENT INSERTION;  Surgeon: Claudio Tyson MD;  Location: [address];  Service: Urology;  Laterality: Bilateral;    INSERTION OF TUNNELED  CENTRAL VENOUS CATHETER (CVC) WITH SUBCUTANEOUS PORT N/A 5/1/2020    Procedure: [id number];  Surgeon: Stephen Diagnostic Provider;  Location: [facility] OR 2ND FLR;  Service: Radiology;  Laterality: N/A;  Room 189/Cindy    LAPAROSCOPIC SIGMOIDECTOMY N/A 3/25/2020    Procedure: COLECTOMY, SIGMOID, LAPAROSCOPIC, flex sig, ERAS high;  Surgeon: Silvio Man MD;  Location: [facility] OR 2ND FLR;  Service: Colon and Rectal;  Laterality: N/A;    MOBILIZATION OF SPLENIC FLEXURE  3/25/2020    Procedure: MOBILIZATION, SPLENIC FLEXURE;  Surgeon: Silvio Man MD;  Location: [facility] OR 2ND FLR;  Service: Colon and Rectal;;    tonsillectomy      TOTAL ABDOMINAL HYSTERECTOMY W/ BILATERAL SALPINGOOPHORECTOMY N/A 3/25/2020    Procedure: HYSTERECTOMY, TOTAL, ABDOMINAL, WITH BILATERAL SALPINGO-OOPHORECTOMY;  Surgeon: Keron Brady MD;  Location: [facility] OR 2ND FLR;  Service: Oncology;  Laterality: N/A;     Family History   Problem Relation Age of Onset    Heart disease Father     Diabetes Mother 65    Drug abuse Brother     Drug abuse Brother     Cancer Maternal Aunt         lung cancer    Cancer Maternal Grandmother         stomach cancer- started in ovaries    Ovarian cancer Maternal Grandmother         stomach cancer- started in ovaries    Colon cancer Paternal Uncle     Cancer Paternal Uncle     Ovarian cancer Maternal Aunt     Ovarian cancer Maternal Aunt     Ovarian cancer Cousin         mother had ovarian cancer    Drug abuse Son     Drug abuse Son         clean/sober since 2012    Colon cancer Son     No Known Problems Daughter     Uterine cancer Neg Hx     Breast cancer Neg Hx      Social History     Tobacco Use    Smoking status: Never    Smokeless tobacco: Never   Substance and Sexual Activity    Alcohol use: Yes     Comment: occasionally- twice monthly    Drug use: Never    Sexual activity: Yes     Partners: Male     Birth control/protection: See Surgical Hx        ROS:   Review of Systems   Constitutional:   "Positive for malaise/fatigue (first few days). Negative for fever and weight loss.   HENT:  Negative for congestion, hearing loss, nosebleeds and sore throat.    Eyes:  Negative for double vision and photophobia.   Respiratory:  Negative for cough, hemoptysis, sputum production, shortness of breath and wheezing.    Cardiovascular:  Negative for chest pain, palpitations, orthopnea and leg swelling.   Gastrointestinal:  Positive for constipation (intermittent) and nausea (improving). Negative for abdominal pain, blood in stool, diarrhea, heartburn and vomiting.   Genitourinary:  Negative for dysuria, frequency, hematuria and urgency.   Musculoskeletal:  Negative for back pain, joint pain and myalgias.   Skin:  Negative for itching and rash.   Neurological:  Negative for dizziness, tingling, seizures, weakness and headaches.   Endo/Heme/Allergies:  Negative for polydipsia. Does not bruise/bleed easily.   Psychiatric/Behavioral:  Negative for depression and memory loss. The patient is nervous/anxious. The patient does not have insomnia.       Objective:     Vitals:    06/19/23 0937   BP: 112/70   Pulse: 82   Resp: 16   Temp: 96.8 °F (36 °C)   TempSrc: Temporal   SpO2: 99%   Weight: 128.9 kg (284 lb 2.8 oz)   Height: 5' 6" (1.676 m)   PainSc:   5   PainLoc: Abdomen         Wt Readings from Last 10 Encounters:   06/19/23 128.9 kg (284 lb 2.8 oz)   06/07/23 129 kg (284 lb 6.3 oz)   06/05/23 129 kg (284 lb 6.3 oz)   06/05/23 129 kg (284 lb 6.3 oz)   05/24/23 128.9 kg (284 lb 2.8 oz)   05/22/23 128.9 kg (284 lb 2.8 oz)   05/22/23 128.9 kg (284 lb 2.8 oz)   05/15/23 130 kg (286 lb 9.6 oz)   05/12/23 129.8 kg (286 lb 2.5 oz)   04/28/23 133.8 kg (294 lb 15.6 oz)       Physical Examination:   Physical Exam  Vitals and nursing note reviewed.   Constitutional:       General: She is not in acute distress.     Appearance: She is not diaphoretic.   HENT:      Head: Normocephalic.      Mouth/Throat:      Pharynx: No oropharyngeal " exudate.   Eyes:      General: No scleral icterus.     Conjunctiva/sclera: Conjunctivae normal.   Neck:      Thyroid: No thyromegaly.   Cardiovascular:      Rate and Rhythm: Normal rate and regular rhythm.      Heart sounds: Normal heart sounds. No murmur heard.  Pulmonary:      Effort: Pulmonary effort is normal. No respiratory distress.      Breath sounds: No stridor. No wheezing or rales.   Chest:      Chest wall: No tenderness.   Abdominal:      General: Bowel sounds are normal. There is no distension.      Palpations: Abdomen is soft. There is no mass.      Tenderness: There is no abdominal tenderness. There is no rebound.   Musculoskeletal:         General: No tenderness or deformity. Normal range of motion.      Cervical back: Neck supple.   Lymphadenopathy:      Cervical: No cervical adenopathy.   Skin:     General: Skin is warm and dry.      Findings: No erythema or rash. Lesion: lower lip herpes labialis completely healed.  Neurological:      Mental Status: She is alert and oriented to person, place, and time.      Cranial Nerves: No cranial nerve deficit.      Coordination: Coordination normal.      Gait: Gait is intact.   Psychiatric:         Mood and Affect: Affect normal.         Cognition and Memory: Memory normal.         Judgment: Judgment normal.        Diagnostic Tests:  Significant Imaging: I have reviewed and interpreted all pertinent imaging results/findings.  Laboratory Data:  All pertinent labs have been reviewed.    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 Oxaliplatin was stopped due to severe adverse reaction.     Follow-up CTs and PET in May 2021 showed enlarging retroperitoneal node, concerning for metastatic disease.      She started FOLFIRI + Avastin and has continued till July 2022.   Given isolated RP toni disease, she has undergone open Left periaortic and aortocaval lymph node dissection on 7/12/2022.     Follow up scans showed disease progression. I have discussed the case with Dr. Montelongo at [facility]. We discussed resuming FOLFIRI-lloyd at this time and then enrolling to  SWOG 2107 (encorafenib/cetuximab +/- nivo) at the time of progression. Trial is now available at [facility].     Extensive hospital records reviewed, CT scans show stable disease.  Clinically ok to continue FOLFIRI and Avastin with close monitoring. Repeat scans in August.     Nausea   Continue compazine. Continue sacuso patch for the first week.     Mucositis   Continue [facility] soln.     Transaminitis  Fluctuates.  Continue to monitor. No obvious liver mets.     Hypercalcemia   Mild, secondary to hyperparathyroidism.  Avoid calcium supplements.     Hypothyroidism  Multinodular goiter   Continue Levothyroxine.  Had a non diagnostic biopsy in 2012, usg findings have remained stable. Reviewed repeat thyroid US, stable, will follow up with Endocrine.     Essential HTN   Continue antihypertensives.      Herpes labialis   Valtrex x 2 courses, now resolved.     Anxiety   Continue to  follow up with Dr. Palm.     MDM includes  :    - Acute or chronic illness or injury that poses a threat to life or bodily function  - Independent review and explanation of 3+ results from unique tests  - Discussion of management and ordering 3+ unique tests  - Extensive discussion of treatment  and management  - Prescription drug management  - Drug therapy requiring intensive monitoring for toxicity     I spent 53 min on this encounter.      ECOG SCORE    1 - Restricted in strenuous activity-ambulatory and able to carry out work of a light nature           Discussion:   No follow-ups on file.    Plan was discussed with the patient at length, and she verbalized understanding. Gail was given an opportunity to ask questions that were answered to her satisfaction, and she was advised to call in the interval if any problems or questions arise.    Electronically signed by Marah Santo MD        Route Chart for Scheduling    Med Onc Chart Routing      Follow up with physician . 7/17 overbook for 10 am   Follow up with [facility]    Infusion scheduling note   the week of 7/17   Injection scheduling note    Labs CBC, CMP and urinalysis   Scheduling:  Preferred lab:  Lab interval:  7/17   Imaging    Pharmacy appointment    Other referrals            Treatment Plan Information   OP COLORECTAL FOLFIRI + BEVACIZUMAB Q2W   Marah Santo MD   Upcoming Treatment Dates - OP COLORECTAL FOLFIRI + BEVACIZUMAB Q2W    6/19/2023       Pre-Medications       LORazepam injection 1 mg       promethazine (PHENERGAN) 6.25 mg in dextrose 5 % (D5W) 100 mL IVPB       palonosetron 0.25mg/dexAMETHasone 20mg in NS IVPB       Chemotherapy       bevacizumab-awwb (MVASI) 5 mg/kg = 680 mg in sodium chloride 0.9% 100 mL infusion       irinotecan (CAMPTOSAR) 440 mg in sodium chloride 0.9% 522 mL chemo infusion       leucovorin calcium 400 mg/m2 = 1,010 mg in dextrose 5 % (D5W) 250 mL infusion       fluorouracil (ADRUCIL) 2,400 mg/m2 = 6,050 mg in sodium chloride 0.9% 240 mL chemo infusion       Supportive Care       atropine injection 0.4 mg  7/3/2023       Pre-Medications       LORazepam injection 1 mg       promethazine (PHENERGAN) 6.25 mg in dextrose 5 % (D5W) 100 mL IVPB       palonosetron 0.25mg/dexAMETHasone 20mg in NS IVPB        Chemotherapy       bevacizumab-awwb (MVASI) 5 mg/kg = 680 mg in sodium chloride 0.9% 100 mL infusion       irinotecan (CAMPTOSAR) 440 mg in sodium chloride 0.9% 522 mL chemo infusion       leucovorin calcium 400 mg/m2 = 1,010 mg in dextrose 5 % (D5W) 250 mL infusion       fluorouracil (ADRUCIL) 2,400 mg/m2 = 6,050 mg in sodium chloride 0.9% 240 mL chemo infusion       Supportive Care       atropine injection 0.4 mg    Supportive Plan Information  IV FLUIDS AND ELECTROLYTES   Brayden Solo MD   Upcoming Treatment Dates - IV FLUIDS AND ELECTROLYTES    No upcoming days in selected categories.    Therapy Plan Information  PORT FLUSH  Flushes  heparin, porcine (PF) 100 unit/mL injection flush 500 Units  500 Units, Intravenous, Every visit  sodium chloride 0.9% flush 10 mL  10 mL, Intravenous, Every visit                    Answers submitted by the patient for this visit:  Review of Systems Questionnaire (Submitted on 6/18/2023)  appetite change : No  unexpected weight change: No  mouth sores: Yes  visual disturbance: No  adenopathy: No

## 2023-06-19 NOTE — PROGRESS NOTES
PSYCHO-ONCOLOGY NOTE/ Individual Psychotherapy     Date: 6/19/2023   Site:  [facility]      Therapeutic Intervention: Met with patient.  Outpatient - Insight oriented psychotherapy 30 min - CPT code 36691 and Outpatient - Supportive psychotherapy 30 min - CPT Code 74852    This includes face to face time and non-face to face time preparing to see the patient, obtaining and/or reviewing separately obtained history, documenting clinical information in the electronic or other health record, independently interpreting results and communicating results to the patient/family/caregiver, or care coordinator.      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    Insomnia    Insomnia    Other constipation    Nausea and vomiting    Mucositis due to chemotherapy    Morbid obesity    Secondary adenocarcinoma of lymph node    Thyroid nodule    Hypercalcemia    Transaminitis    Hypophosphatemia    Functional diarrhea    Primary hypertension    Diarrhea of presumed infectious origin    Calculus of gallbladder without cholecystitis without obstruction    ACP (advance care planning)    Abdominal pain    Shortness of breath       Chief complaint/reason for encounter: adjustment to illness, depression and anxiety      Met with patient to evaluate psychosocial adaptation to diagnosis/treatment of metastatic colon cancer    Current Medications  Current Outpatient Medications   Medication    acetaminophen (TYLENOL) 500 MG tablet    buPROPion (WELLBUTRIN SR) 150 MG TBSR 12 hr tablet    FLUoxetine (PROZAC) 20 MG capsule    granisetron (SANCUSO) 3.1 mg/24 hour    Lactobacillus rhamnosus GG  (CULTURELLE) 10 billion cell capsule    levothyroxine (SYNTHROID) 200 MCG tablet    LIDOcaine-prilocaine (EMLA) cream    LORazepam (ATIVAN) 1 MG tablet    magic mouthwash diphen/antac/lidoc/nysta    multivitamin (THERAGRAN) per tablet    olmesartan (BENICAR) 20 MG tablet    ondansetron (ZOFRAN-ODT) 8 MG TbDL    oxyCODONE-acetaminophen (PERCOCET) 5-325 mg per tablet    prochlorperazine (COMPAZINE) 10 MG tablet    senna-docusate 8.6-50 mg (PERICOLACE) 8.6-50 mg per tablet    traZODone (DESYREL) 50 MG tablet    valACYclovir (VALTREX) 500 MG tablet     No current facility-administered medications for this visit.     Facility-Administered Medications Ordered in Other Visits   Medication Frequency    fluorouracil (ADRUCIL) 6,000 mg in sodium chloride 0.9% 240 mL chemo infusion over 46 hr    heparin, porcine (PF) 100 unit/mL injection flush 500 Units PRN       ONCOLOGY HISTORY  Oncology History   Malignant neoplasm of sigmoid colon   3/16/2020 Initial Diagnosis    Malignant neoplasm of sigmoid colon     3/31/2020 Cancer Staged    Staging form: Colon and Rectum, AJCC 8th Edition  - Clinical stage from 3/31/2020: Stage IIIC (cT4b, cN2a, cM0)     5/6/2020 - 11/17/2020 Chemotherapy    Treatment Summary   Plan Name: OP FOLFOX 6 Q2W  Treatment Goal: Curative  Status: Inactive  Start Date: 5/6/2020  End Date: 11/6/2020  Provider: Dylan Leyva MD  Chemotherapy: fluorouraciL injection 945 mg, 400 mg/m2 = 945 mg, Intravenous, Clinic/[facility] 1 time, 14 of 14 cycles  Administration: 945 mg (5/6/2020), 945 mg (5/20/2020), 945 mg (6/3/2020), 945 mg (6/17/2020), 945 mg (7/29/2020), 945 mg (8/12/2020), 945 mg (8/26/2020), 945 mg (9/9/2020), 945 mg (9/23/2020), 945 mg (10/6/2020), 945 mg (10/21/2020), 945 mg (11/4/2020)  fluorouraciL 2,400 mg/m2 = 5,665 mg in sodium chloride 0.9% 240 mL chemo infusion, 2,400 mg/m2 = 5,665 mg, Intravenous, Over 46 hours, 14 of 14 cycles  Administration: 5,665 mg (5/6/2020), 5,665 mg (5/20/2020), 5,665 mg  (6/3/2020), 5,665 mg (6/17/2020), 5,665 mg (7/29/2020), 5,665 mg (8/12/2020), 5,665 mg (8/26/2020), 5,665 mg (9/9/2020), 5,665 mg (9/23/2020), 5,665 mg (10/6/2020), 5,665 mg (10/21/2020), 5,665 mg (11/4/2020)  leucovorin calcium 900 mg in dextrose 5 % 250 mL infusion, 945 mg, Intravenous, Clinic/[facility] 1 time, 14 of 14 cycles  Administration: 900 mg (5/6/2020), 900 mg (5/20/2020), 900 mg (6/3/2020), 900 mg (6/17/2020), 945 mg (6/30/2020), 945 mg (7/20/2020), 945 mg (7/29/2020), 945 mg (8/12/2020), 945 mg (8/26/2020), 945 mg (9/9/2020), 945 mg (9/23/2020), 945 mg (10/6/2020), 945 mg (10/21/2020), 945 mg (11/4/2020)  oxaliplatin (ELOXATIN) 200 mg in dextrose 5 % 500 mL chemo infusion, 201 mg, Intravenous, Clinic/[facility] 1 time, 6 of 6 cycles  Dose modification: 65 mg/m2 (original dose 85 mg/m2, Cycle 6)  Administration: 200 mg (5/6/2020), 200 mg (5/20/2020), 200 mg (6/3/2020), 200 mg (6/17/2020), 201 mg (6/30/2020), 150 mg (7/20/2020)     6/16/2021 -  Chemotherapy    Treatment Summary   Plan Name: OP COLORECTAL FOLFIRI + BEVACIZUMAB Q2W  Treatment Goal: Control  Status: Active  Start Date: 6/16/2021  End Date: 7/5/2023 (Planned)  Provider: Marah Santo MD  Chemotherapy: fluorouraciL injection 990 mg, 400 mg/m2 = 990 mg, Intravenous, Clinic/[facility] 1 time, 15 of 15 cycles  Administration: 990 mg (6/16/2021), 990 mg (6/30/2021), 990 mg (7/14/2021), 980 mg (7/28/2021), 990 mg (8/11/2021), 990 mg (8/25/2021), 990 mg (9/8/2021), 990 mg (9/22/2021), 990 mg (10/6/2021), 990 mg (10/20/2021), 990 mg (11/3/2021), 990 mg (12/1/2021), 990 mg (12/15/2021), 990 mg (11/17/2021), 990 mg (12/28/2021)  fluorouraciL 2,400 mg/m2 = 5,950 mg in sodium chloride 0.9% 240 mL chemo infusion, 2,400 mg/m2 = 5,950 mg, Intravenous, Over 46 hours, 41 of 42 cycles  Administration: 5,950 mg (6/16/2021), 5,950 mg (6/30/2021), 5,950 mg (7/14/2021), 5,880 mg (7/28/2021), 5,930 mg (8/11/2021), 5,930 mg (8/25/2021), 5,930 mg (9/8/2021), 5,930 mg (9/22/2021),  5,930 mg (10/6/2021), 5,930 mg (10/20/2021), 5,930 mg (11/3/2021), 5,930 mg (12/1/2021), 5,930 mg (12/15/2021), 5,930 mg (11/17/2021), 5,930 mg (12/28/2021), 6,050 mg (5/11/2022), 6,025 mg (3/9/2022), 6,025 mg (2/23/2022), 5,930 mg (2/2/2022), 5,930 mg (1/19/2022), 6,050 mg (4/20/2022), 6,025 mg (4/6/2022), 6,025 mg (3/23/2022), 6,050 mg (6/1/2022), 6,050 mg (6/15/2022), 6,050 mg (10/19/2022), 6,050 mg (10/5/2022), 6,050 mg (11/2/2022), 6,050 mg (11/16/2022), 6,050 mg (11/30/2022), 6,050 mg (1/4/2023), 6,050 mg (12/19/2022), 6,050 mg (1/18/2023), 6,000 mg (5/22/2023), 6,000 mg (4/26/2023), 6,000 mg (4/11/2023), 6,000 mg (2/28/2023), 6,050 mg (2/14/2023), 6,000 mg (2/1/2023), 6,000 mg (6/5/2023)  bevacizumab (AVASTIN) 600 mg in sodium chloride 0.9% 100 mL chemo infusion, 660 mg, Intravenous, [facility] 1 time, 36 of 36 cycles  Dose modification: 5 mg/kg (original dose 5 mg/kg, Cycle 26, Reason: MD Discretion, Comment: 5 mg/kg original dose)  Administration: 600 mg (6/30/2021), 600 mg (7/14/2021), 600 mg (7/28/2021), 600 mg (6/16/2021), 600 mg (8/11/2021), 655 mg (8/25/2021), 600 mg (9/8/2021), 600 mg (9/22/2021), 600 mg (10/6/2021), 600 mg (10/20/2021), 600 mg (11/3/2021), 655 mg (12/1/2021), 600 mg (12/15/2021), 600 mg (11/17/2021), 600 mg (12/28/2021), 600 mg (5/11/2022), 600 mg (3/9/2022), 600 mg (2/23/2022), 600 mg (2/2/2022), 600 mg (1/19/2022), 600 mg (4/20/2022), 680 mg (4/6/2022), 600 mg (3/23/2022), 680 mg (10/5/2022), 680 mg (10/19/2022), 680 mg (11/2/2022), 680 mg (11/16/2022), 680 mg (11/30/2022), 680 mg (1/4/2023), 680 mg (12/19/2022), 680 mg (1/18/2023), 680 mg (3/28/2023), 680 mg (3/14/2023), 680 mg (2/28/2023), 680 mg (2/14/2023), 680 mg (2/1/2023)  irinotecan (CAMPTOSAR) 440 mg in sodium chloride 0.9% 500 mL chemo infusion, 446 mg, Intravenous, [facility] 1 time, 43 of 44 cycles  Dose modification: 180 mg/m2 (original dose 180 mg/m2, Cycle 24)  Administration: 440 mg (6/16/2021), 440 mg (6/30/2021),  440 mg (7/14/2021), 440 mg (7/28/2021), 440 mg (8/11/2021), 444 mg (8/25/2021), 440 mg (9/8/2021), 440 mg (9/22/2021), 440 mg (10/6/2021), 440 mg (10/20/2021), 440 mg (11/3/2021), 440 mg (12/1/2021), 440 mg (12/15/2021), 440 mg (11/17/2021), 440 mg (12/28/2021), 440 mg (5/11/2022), 440 mg (3/9/2022), 440 mg (2/23/2022), 440 mg (2/2/2022), 440 mg (1/19/2022), 440 mg (4/20/2022), 440 mg (4/6/2022), 440 mg (3/24/2022), 440 mg (6/1/2022), 440 mg (6/15/2022), 440 mg (10/19/2022), 440 mg (10/5/2022), 440 mg (11/2/2022), 440 mg (11/16/2022), 440 mg (11/30/2022), 440 mg (1/4/2023), 440 mg (12/19/2022), 440 mg (1/18/2023), 440 mg (5/22/2023), 440 mg (4/26/2023), 440 mg (4/11/2023), 440 mg (3/28/2023), 440 mg (3/14/2023), 440 mg (2/28/2023), 440 mg (2/14/2023), 440 mg (2/1/2023), 440 mg (6/5/2023)  bevacizumab-awwb (MVASI) 5 mg/kg = 680 mg in sodium chloride 0.9% 100 mL infusion, 5 mg/kg = 680 mg (100 % of original dose 5 mg/kg), Intravenous, Clinic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     5/6/2020 - 11/17/2020 Chemotherapy    Treatment Summary   Plan Name: OP FOLFOX 6 Q2W  Treatment Goal: Curative  Status: Inactive  Start Date: 5/6/2020  End Date: 11/6/2020  Provider: Dylan Leyva MD  Chemotherapy: fluorouraciL injection 945 mg, 400 mg/m2 = 945 mg, Intravenous, Clinic/[facility] 1 time, 14 of 14 cycles  Administration: 945 mg (5/6/2020), 945 mg (5/20/2020), 945 mg (6/3/2020), 945 mg (6/17/2020), 945 mg (7/29/2020), 945 mg (8/12/2020), 945 mg (8/26/2020), 945 mg (9/9/2020), 945 mg (9/23/2020), 945 mg (10/6/2020), 945 mg (10/21/2020), 945 mg (11/4/2020)  fluorouraciL 2,400 mg/m2 = 5,665 mg in sodium chloride 0.9% 240 mL chemo infusion, 2,400 mg/m2 = 5,665 mg, Intravenous, Over 46  hours, 14 of 14 cycles  Administration: 5,665 mg (5/6/2020), 5,665 mg (5/20/2020), 5,665 mg (6/3/2020), 5,665 mg (6/17/2020), 5,665 mg (7/29/2020), 5,665 mg (8/12/2020), 5,665 mg (8/26/2020), 5,665 mg (9/9/2020), 5,665 mg (9/23/2020), 5,665 mg (10/6/2020), 5,665 mg (10/21/2020), 5,665 mg (11/4/2020)  leucovorin calcium 900 mg in dextrose 5 % 250 mL infusion, 945 mg, Intravenous, Clinic/HOD 1 time, 14 of 14 cycles  Administration: 900 mg (5/6/2020), 900 mg (5/20/2020), 900 mg (6/3/2020), 900 mg (6/17/2020), 945 mg (6/30/2020), 945 mg (7/20/2020), 945 mg (7/29/2020), 945 mg (8/12/2020), 945 mg (8/26/2020), 945 mg (9/9/2020), 945 mg (9/23/2020), 945 mg (10/6/2020), 945 mg (10/21/2020), 945 mg (11/4/2020)  oxaliplatin (ELOXATIN) 200 mg in dextrose 5 % 500 mL chemo infusion, 201 mg, Intravenous, Clinic/HOD 1 time, 6 of 6 cycles  Dose modification: 65 mg/m2 (original dose 85 mg/m2, Cycle 6)  Administration: 200 mg (5/6/2020), 200 mg (5/20/2020), 200 mg (6/3/2020), 200 mg (6/17/2020), 201 mg (6/30/2020), 150 mg (7/20/2020)     6/16/2021 -  Chemotherapy    Treatment Summary   Plan Name: OP COLORECTAL FOLFIRI + BEVACIZUMAB Q2W  Treatment Goal: Control  Status: Active  Start Date: 6/16/2021  End Date: 7/5/2023 (Planned)  Provider: Marah Santo MD  Chemotherapy: fluorouraciL injection 990 mg, 400 mg/m2 = 990 mg, Intravenous, [facility] 1 time, 15 of 15 cycles  Administration: 990 mg (6/16/2021), 990 mg (6/30/2021), 990 mg (7/14/2021), 980 mg (7/28/2021), 990 mg (8/11/2021), 990 mg (8/25/2021), 990 mg (9/8/2021), 990 mg (9/22/2021), 990 mg (10/6/2021), 990 mg (10/20/2021), 990 mg (11/3/2021), 990 mg (12/1/2021), 990 mg (12/15/2021), 990 mg (11/17/2021), 990 mg (12/28/2021)  fluorouraciL 2,400 mg/m2 = 5,950 mg in sodium chloride 0.9% 240 mL chemo infusion, 2,400 mg/m2 = 5,950 mg, Intravenous, Over 46 hours, 41 of 42 cycles  Administration: 5,950 mg (6/16/2021), 5,950 mg (6/30/2021), 5,950 mg (7/14/2021), 5,880 mg  (7/28/2021), 5,930 mg (8/11/2021), 5,930 mg (8/25/2021), 5,930 mg (9/8/2021), 5,930 mg (9/22/2021), 5,930 mg (10/6/2021), 5,930 mg (10/20/2021), 5,930 mg (11/3/2021), 5,930 mg (12/1/2021), 5,930 mg (12/15/2021), 5,930 mg (11/17/2021), 5,930 mg (12/28/2021), 6,050 mg (5/11/2022), 6,025 mg (3/9/2022), 6,025 mg (2/23/2022), 5,930 mg (2/2/2022), 5,930 mg (1/19/2022), 6,050 mg (4/20/2022), 6,025 mg (4/6/2022), 6,025 mg (3/23/2022), 6,050 mg (6/1/2022), 6,050 mg (6/15/2022), 6,050 mg (10/19/2022), 6,050 mg (10/5/2022), 6,050 mg (11/2/2022), 6,050 mg (11/16/2022), 6,050 mg (11/30/2022), 6,050 mg (1/4/2023), 6,050 mg (12/19/2022), 6,050 mg (1/18/2023), 6,000 mg (5/22/2023), 6,000 mg (4/26/2023), 6,000 mg (4/11/2023), 6,000 mg (2/28/2023), 6,050 mg (2/14/2023), 6,000 mg (2/1/2023), 6,000 mg (6/5/2023)  bevacizumab (AVASTIN) 600 mg in sodium chloride 0.9% 100 mL chemo infusion, 660 mg, Intravenous, Clinic/[facility] 1 time, 36 of 36 cycles  Dose modification: 5 mg/kg (original dose 5 mg/kg, Cycle 26, Reason: MD Discretion, Comment: 5 mg/kg original dose)  Administration: 600 mg (6/30/2021), 600 mg (7/14/2021), 600 mg (7/28/2021), 600 mg (6/16/2021), 600 mg (8/11/2021), 655 mg (8/25/2021), 600 mg (9/8/2021), 600 mg (9/22/2021), 600 mg (10/6/2021), 600 mg (10/20/2021), 600 mg (11/3/2021), 655 mg (12/1/2021), 600 mg (12/15/2021), 600 mg (11/17/2021), 600 mg (12/28/2021), 600 mg (5/11/2022), 600 mg (3/9/2022), 600 mg (2/23/2022), 600 mg (2/2/2022), 600 mg (1/19/2022), 600 mg (4/20/2022), 680 mg (4/6/2022), 600 mg (3/23/2022), 680 mg (10/5/2022), 680 mg (10/19/2022), 680 mg (11/2/2022), 680 mg (11/16/2022), 680 mg (11/30/2022), 680 mg (1/4/2023), 680 mg (12/19/2022), 680 mg (1/18/2023), 680 mg (3/28/2023), 680 mg (3/14/2023), 680 mg (2/28/2023), 680 mg (2/14/2023), 680 mg (2/1/2023)  irinotecan (CAMPTOSAR) 440 mg in sodium chloride 0.9% 500 mL chemo infusion, 446 mg, Intravenous, Clinic/HOD 1 time, 43 of 44 cycles  Dose modification: 180  mg/m2 (original dose 180 mg/m2, Cycle 24)  Administration: 440 mg (6/16/2021), 440 mg (6/30/2021), 440 mg (7/14/2021), 440 mg (7/28/2021), 440 mg (8/11/2021), 444 mg (8/25/2021), 440 mg (9/8/2021), 440 mg (9/22/2021), 440 mg (10/6/2021), 440 mg (10/20/2021), 440 mg (11/3/2021), 440 mg (12/1/2021), 440 mg (12/15/2021), 440 mg (11/17/2021), 440 mg (12/28/2021), 440 mg (5/11/2022), 440 mg (3/9/2022), 440 mg (2/23/2022), 440 mg (2/2/2022), 440 mg (1/19/2022), 440 mg (4/20/2022), 440 mg (4/6/2022), 440 mg (3/24/2022), 440 mg (6/1/2022), 440 mg (6/15/2022), 440 mg (10/19/2022), 440 mg (10/5/2022), 440 mg (11/2/2022), 440 mg (11/16/2022), 440 mg (11/30/2022), 440 mg (1/4/2023), 440 mg (12/19/2022), 440 mg (1/18/2023), 440 mg (5/22/2023), 440 mg (4/26/2023), 440 mg (4/11/2023), 440 mg (3/28/2023), 440 mg (3/14/2023), 440 mg (2/28/2023), 440 mg (2/14/2023), 440 mg (2/1/2023), 440 mg (6/5/2023)  bevacizumab-awwb (MVASI) 5 mg/kg = 680 mg in sodium chloride 0.9% 100 mL infusion, 5 mg/kg = 680 mg (100 % of original dose 5 mg/kg), Intravenous, Clinic/H[facility] 1 time, 5 of 6 cycles  Dose modification: 5 mg/kg (original dose 5 mg/kg, Cycle 39)  Administration: 680 mg (4/11/2023), 680 mg (4/26/2023), 680 mg (5/22/2023), 680 mg (6/5/2023)         Objective:  Gail Mckinnon arrived promptly for the session. Ms. Mckinnon was seated and undergoing infusion at the time of session. The patient was fully cooperative throughout the session.  Appearance: age appropriate, casually  dressed, well groomed  Behavior/Cooperation: friendly and cooperative  Speech: appropriate quality, quantity and organization of sentences and quiet  Mood: sad  Affect: mood congruent and appropriate  Thought Process: goal-directed, logical  Thought Content: normal,  No delusions or paranoia; did not appear to be responding to internal stimuli during the session  Orientation: grossly intact  Memory: grossly intact  Attention Span/Concentration: Attends to session  without distraction; reports no difficulty  Fund of Knowledge: average  Estimate of Intelligence: average from verbal skills and history  Cognition: grossly intact  Insight: patient has awareness of illness; good insight into own behavior and behavior of others  Judgment: the patient's behavior is adequate to circumstances    NCCN Distress thermometer:   DISTRESS SCREENING 6/19/2023 6/18/2023 6/5/2023 5/19/2023 5/15/2023 5/15/2023 4/25/2023   Distress Score 5 7 0 - No Distress 5 0 - No Distress 0 - No Distress 3   Practical Problems None of these Insurance/Financial None of these Insurance/Financial None of these None of these Insurance/Financial;Treatment Decisions   Family Problems Dealing with Children Dealing with Children;Family Health Issues None of these Dealing with Children;Family Health Issues None of these None of these Dealing with Children;Family Health Issues   Emotional Problems Loss of Interest;Depression Depression;Sadness;Worry;Loss of Interest None of these Depression;Sadness;Worry None of these None of these Depression;Sadness   Spiritual / Ep[employer]opalian Concerns No No No No No No No   Physical Problems Constipation;Pain Fatigue;Memory/Concentration;Mouth Sores;Pain;Tingling in hands or feet None of these Constipation;Fatigue;Feeling Swollen;Mouth Sores;Nausea;Skin Dry/Itchy;Tingling in hands or feet None of these None of these Appearance;Fatigue;Feeling Swollen;Memory/Concentration;Mouth Sores;Nausea;Skin Dry/Itchy;Tingling in hands or feet   Other Problems - - - - - - -   Some encounter information is confidential and restricted. Go to Review Flowsheets activity to see all data.        Interval history and content of current session: Discussed treatment and family's adaptation to disease and treatment status. Reports to be coping with moderate difficulty, stating that son's recent return home has offset balance w/ daughter. Evaluated cognitive response, paying particular attention to negative  intrusive thoughts of a persistent and detrimental nature. Thoughts of this type are in evidence with moderate distress. Provided cognitive behavioral therapy to address negative cognitions, further discussing boundaries and the importance of sustained psychiatric medication adherence. Identified and evaluated psychosocial and environmental stressors secondary to diagnosis and treatment.  Examined proactive behaviors that may be implemented to minimize or ameliorate psychosocial stressors secondary to chronic illness and treatment.     Risk parameters:   Patient reports no suicidal ideation  Patient reports no homicidal ideation  Patient reports no self-injurious behavior  Patient reports no violent behavior   Safety needs:  None at this time      Verbal deficits: None     Patient's response to intervention:The patient's response to intervention is accepting, motivated.     Progress toward goals and other mental status changes:  The patient's progress toward goals is fair .      Progress to date:Progress - Ongoing, but Slow      Goals from last visit: Attempted, partially met        Patient Strengths: verbal, intelligent, successful, good social support, good insight, commitment to wellness, strong miah, strong cultural traditions     Treatment Plan:individual psychotherapy and medication management by physician  Target symptoms: depression, anxiety   Why chosen therapy is appropriate versus another modality: relevant to diagnosis, patient responds to this modality, evidence based practice  Outcome monitoring methods: self-report, observation, checklist/rating scale  Therapeutic intervention type: insight oriented psychotherapy, behavior modifying psychotherapy, supportive psychotherapy  Prognosis: Fair      Behavioral goals:    Social engagement:continued, interpersonal boundaries   Therapy:CBT, medication adherence, psychotherapeutic support    Return to clinic: 3 weeks     Length of Service (minutes direct  face-to-face contact): 30    Diagnosis:     ICD-10-CM ICD-9-CM   1. Moderate episode of recurrent major depressive disorder  F33.1 296.32   2. Anxiety associated with cancer diagnosis  F41.1 300.09    C80.1                 Maximo Peters License #[id number]  [place] License #[id number]

## 2023-06-21 ENCOUNTER — DOCUMENTATION ONLY (OUTPATIENT)
Dept: INFUSION THERAPY | Facility: HOSPITAL | Age: 53
End: 2023-06-21
Payer: MEDICAID

## 2023-06-21 ENCOUNTER — INFUSION (OUTPATIENT)
Dept: INFUSION THERAPY | Facility: HOSPITAL | Age: 53
End: 2023-06-21
Attending: INTERNAL MEDICINE
Payer: MEDICAID

## 2023-06-21 VITALS
TEMPERATURE: 98 F | DIASTOLIC BLOOD PRESSURE: 70 MMHG | WEIGHT: 284.19 LBS | SYSTOLIC BLOOD PRESSURE: 111 MMHG | HEIGHT: 66 IN | BODY MASS INDEX: 45.67 KG/M2 | RESPIRATION RATE: 18 BRPM | HEART RATE: 69 BPM

## 2023-06-21 DIAGNOSIS — C77.2 METASTASIS TO INTESTINAL LYMPH NODE: Primary | ICD-10-CM

## 2023-06-21 DIAGNOSIS — C18.7 MALIGNANT NEOPLASM OF SIGMOID COLON: ICD-10-CM

## 2023-06-21 PROCEDURE — 96360 HYDRATION IV INFUSION INIT: CPT | Mod: PN

## 2023-06-21 PROCEDURE — 96361 HYDRATE IV INFUSION ADD-ON: CPT | Mod: PN

## 2023-06-21 PROCEDURE — 63600175 PHARM REV CODE 636 W HCPCS: Mod: PN | Performed by: INTERNAL MEDICINE

## 2023-06-21 PROCEDURE — 25000003 PHARM REV CODE 250: Mod: PN | Performed by: INTERNAL MEDICINE

## 2023-06-21 RX ORDER — SODIUM CHLORIDE 9 MG/ML
1000 INJECTION, SOLUTION INTRAVENOUS
Status: DISCONTINUED | OUTPATIENT
Start: 2023-06-21 | End: 2023-06-21 | Stop reason: HOSPADM

## 2023-06-21 RX ORDER — HEPARIN 100 UNIT/ML
500 SYRINGE INTRAVENOUS
Status: DISCONTINUED | OUTPATIENT
Start: 2023-06-21 | End: 2023-06-21 | Stop reason: HOSPADM

## 2023-06-21 RX ADMIN — SODIUM CHLORIDE 1000 ML: 9 INJECTION, SOLUTION INTRAVENOUS at 10:06

## 2023-06-21 RX ADMIN — Medication 500 UNITS: at 12:06

## 2023-06-21 NOTE — PROGRESS NOTES
Oncology   Chemotherapy Infusion Visit    Quick Social Service Status Follow Up   Met w/ pt briefly to follow up on social and emotional needs. SW met with pt at chairside. She reports she has started receiving her soc sec disability. She receives $1350 a month. She said she is still looking for part time work to help supplement her income. She said she still has to ask for a little bit of help with her bills. She has been looking for a job seriously for the last 2 months. Voiced frustration with difficulty of being over qualified, under qualified or only finding full time opening. SW validated feelings. Pt is on several job search apps. SW and pt discussed work of mouth as well in trying to find employment.        Radha Giordano, MEDINA  06/21/2023  12:58 PM

## 2023-06-21 NOTE — PROGRESS NOTES
Oncology Nutrition   Chemotherapy Infusion Visit    Nutrition Follow Up   RD met with patient at chairside during infusion treatment. Pt reports continues to do well nutritionally- eating without difficulty, maintaining weight, and denies nutrition related side effects.    Pt eligible for TFP but denies need for order at this time.    Wt Readings from Last 10 Encounters:   06/21/23 128.9 kg (284 lb 2.8 oz)   06/19/23 128.9 kg (284 lb 2.8 oz)   06/19/23 128.9 kg (284 lb 2.8 oz)   06/07/23 129 kg (284 lb 6.3 oz)   06/05/23 129 kg (284 lb 6.3 oz)   06/05/23 129 kg (284 lb 6.3 oz)   05/24/23 128.9 kg (284 lb 2.8 oz)   05/22/23 128.9 kg (284 lb 2.8 oz)   05/22/23 128.9 kg (284 lb 2.8 oz)   05/15/23 130 kg (286 lb 9.6 oz)       All other nutrition questions/concerns addressed as appropriate. Will continue to follow and monitor throughout treatment PRN.     Raquel Bullock, MS, RD, LDN  06/21/2023  2:57 PM

## 2023-06-22 ENCOUNTER — PATIENT MESSAGE (OUTPATIENT)
Dept: HEMATOLOGY/ONCOLOGY | Facility: CLINIC | Age: 53
End: 2023-06-22
Payer: MEDICAID

## 2023-06-28 NOTE — PROGRESS NOTES
PATIENT: Gail Mckinnon  MRN: [id number]  DATE: 6/29/2023      Diagnosis:   1. Malignant neoplasm of sigmoid colon    2. 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 Nausea    4. Diarrhea, unspecified type    5. Oral candidiasis    6. Mucositis due to chemotherapy    7. Transaminitis    8. Hypercalcemia    9. Multinodular goiter    10. Hypertension, unspecified type    11. Anxiety          Chief Complaint: Malignant neoplasm of sigmoid colon (Follow up with labs)      Subjective:   HPI: Ms. Mckinnon is a 53 y.o. female  patient known to Dr. Santo for diagnosis of colon cancer, initially stage III and now with LN recurrence.      She completed adjuvant FOLFOX in November 2020. She tolerated only 4 cycles of FOLFOX before she developed an infusion reaction to oxaliplatin in cycle 5 was well as cycle 6. She then completed 6 cycles of infusional 5 FU.     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 She is now on second line therapy with FOLFIRI and Avastin     4/18/22 PET scan mid April that showed possible progression of her disease with enlarging lymphadenopahty from 1.7cm to 1.9 cm, no new lesion.     For now, we have continued FOLFIRI+ Avastin with plan to repeat short term imaging. She has been to [facility] for another opinion. No change was recommended in systemic therapy but she was called back to discuss surgical options.      She saw surgical oncology on 5/28 and they recommend surgical removal of the aorta caval nodes.  Recent sans at [facility]  show stable disease.      Surgery done 7/12/22. Received 2 more cycle of FOLFIRI without Avastin.      She had repeat imaging at [facility] on 9/24/22 that unfortunately showed disease progression since her surgery with multiple RP nodes and one mediastinal node.     PET 12/8/22  Impression:     1. Progression of disease with interval increase of abdominal and pelvic lymphadenopathy.  2. New area of moderate FDG uptake at the right half of the T9 vertebral  body (image 124).  Favor degenerative disc changes.  No underlying osteolytic or osteoblastic lesion.  Recommend continued attention on follow-up.  3. No other evidence of FDG avid disease.     12/22/22  Impression After scan comparison:     1. Metastatic portacaval and left periaortic retroperitoneal lymph nodes which remain stable between the CT of 09/24/2022 and the subsequent PET-CT of 12/08/2022.  There is no evidence of progression of metastatic disease.  2. Nonobstructing bilateral renal calculi and cholelithiasis.    She had virtual visit at [facility] on 2/17/23.     She has continued FOLFIRI and Avastin.     CT abd pelvis   5/7/23  Impression:  1. Mesenteric inflammatory changes have worsened since the prior study.  2. Mesenteric, retroperitoneal and left-sided pelvic enlarged lymph nodes are unchanged.    Interim:    Patient is here today in consideration of her next cycle of FOLFIRI/Avastin due on 7/3/23  Had been getting Xeloda due to shortage of 5FU but did not tolerate this well. Was switched back to 5FU on 4/11/23.    Nausea is managed with PRN use of Ondansetron and Compazine, occasionally has to use Sancuso patch.   Continues to have postnasal drip and mild sore throat symptoms, is not taking OTC medications. Tried [facility] for 1 day without change in symptoms. No fevers.   Has plans for travel later this month and would like to skip treatment on 7/17  Denies HA, CP, cough, SOB, abd pain, diarrhea, constipation, dysuria, swelling, fevers, chills, new lumps or bumps.     Past Medical History:   Past Medical History:   Diagnosis Date    Anxiety     Depression     FH: ovarian cancer 3/16/2020    Hx of psychiatric care     Effexor, Paxil, Lexapro, Zoloft, Wellbutrin, Trazodone Buspar    Hyperthyroidism     Hypothyroid     Kidney calculi     Malignant neoplasm of sigmoid colon 3/16/2020    Menorrhagia     Multinodular goiter 8/24/2012    Palpitation     Psychiatric problem     Venous insufficiency         Past Surgical HIstory:   Past Surgical History:   Procedure Laterality Date     SECTION, CLASSIC      x3    COLONOSCOPY N/A 2020    Procedure: COLONOSCOPY;  Surgeon: Shane Parker MD;  Location: [facility];  Service: Endoscopy;  Laterality: N/A;    COLONOSCOPY N/A 2022    Procedure: COLONOSCOPY;  Surgeon: Shane Parker MD;  Location: [facility] ENDO;  Service: Endoscopy;  Laterality: N/A;    COLONOSCOPY N/A 2023    Procedure: COLONOSCOPY;  Surgeon: Shane Parker MD;  Location: [facility];  Service: Endoscopy;  Laterality: N/A;  no cecum anastomosis    CYSTOSCOPY W/ URETERAL STENT PLACEMENT Bilateral 3/25/2020    Procedure: CYSTOSCOPY, WITH URETERAL STENT INSERTION;  Surgeon: Claudio Tyson MD;  [facility];  Service: Urology;  Laterality: Bilateral;    INSERTION OF TUNNELED CENTRAL VENOUS CATHETER (CVC) WITH SUBCUTANEOUS PORT N/A 2020    Procedure: [id number];  Surgeon: [facility] Diagnostic Provider;  Location: [facility];  Service: Radiology;  Laterality: N/A;  Room 189/Cindy    LAPAROSCOPIC SIGMOIDECTOMY N/A 3/25/2020    Procedure: COLECTOMY, SIGMOID, LAPAROSCOPIC, flex sig, ERAS high;  Surgeon: Silvio Man MD;  [facility]: [facility];  Service: Colon and Rectal;  Laterality: N/A;    MOBILIZATION OF SPLENIC FLEXURE  3/25/2020    Procedure: MOBILIZATION, SPLENIC FLEXURE;  Surgeon: Silvio Man MD;  Location: [facility] OR [facility];  Service: Colon and Rectal;;    tonsillectomy      TOTAL ABDOMINAL HYSTERECTOMY W/ BILATERAL SALPINGOOPHORECTOMY N/A 3/25/2020    Procedure: HYSTERECTOMY, TOTAL, ABDOMINAL, WITH BILATERAL SALPINGO-OOPHORECTOMY;  Surgeon: Keron Brady MD;  Location: [facility] OR [facility];  Service: Oncology;  Laterality: N/A;       Family History:   Family History   Problem Relation Age of Onset    Heart disease Father     Diabetes Mother 65    Drug abuse Brother     Drug abuse Brother     Cancer Maternal  Aunt         lung cancer    Cancer Maternal Grandmother         stomach cancer- started in ovaries    Ovarian cancer Maternal Grandmother         stomach cancer- started in ovaries    Colon cancer Paternal Uncle     Cancer Paternal Uncle     Ovarian cancer Maternal Aunt     Ovarian cancer Maternal Aunt     Ovarian cancer Cousin         mother had ovarian cancer    Drug abuse Son     Drug abuse Son         clean/sober since 2012    Colon cancer Son     No Known Problems Daughter     Uterine cancer Neg Hx     Breast cancer Neg Hx        Social History:  reports that she has never smoked. She has never used smokeless tobacco. She reports current alcohol use. She reports that she does not use drugs.    Allergies:  Review of patient's allergies indicates:   Allergen Reactions    Oxaliplatin Other (See Comments)     Itchy hands, throat closed up, trouble hearing - during infusion    Penicillins Hives and Rash     childhood allergy  childhood allergy  unknown       Medications:  Current Outpatient Medications   Medication Sig Dispense Refill    acetaminophen (TYLENOL) 500 MG tablet Take 2 tablets (1,000 mg total) by mouth every 8 (eight) hours. 60 tablet 0    buPROPion (WELLBUTRIN SR) 150 MG TBSR 12 hr tablet Take 1 tablet (150 mg total) by mouth 2 (two) times daily. (Patient taking differently: Take 150 mg by mouth once daily.) 180 tablet 0    FLUoxetine (PROZAC) 20 MG capsule Take 1 capsule (20 mg total) by mouth once daily. 30 capsule 1    granisetron (SANCUSO) 3.1 mg/24 hour Place 1 patch (3.1 mg total) onto the skin every 7 days AS DIRECTED. 4 patch 3    Lactobacillus rhamnosus GG (CULTURELLE) 10 billion cell capsule Take 1 capsule by mouth once daily.      levothyroxine (SYNTHROID) 200 MCG tablet Take 1 tablet (200 mcg total) by mouth once daily. 90 tablet 1    LIDOcaine-prilocaine (EMLA) cream Apply topically as needed (30 to 60 min prior chemo or port use). 30 g 3    LORazepam (ATIVAN) 1 MG  tablet Take 1 tablet (1 mg total) by mouth every 12 (twelve) hours as needed for Anxiety (nausea). 30 tablet 0    magic mouthwash diphen/antac/lidoc/nysta Swish and swallow 5 mL every 4 hours as needed for mouth pain/ulcers 120 mL 2    multivitamin (THERAGRAN) per tablet Take 1 tablet by mouth once daily.      olmesartan (BENICAR) 20 MG tablet Take 1 tablet (20 mg total) by mouth once daily. 30 tablet 5    ondansetron (ZOFRAN-ODT) 8 MG TbDL Take 1 tablet (8 mg total) by mouth every 8 (eight) hours as needed. 60 tablet 3    oxyCODONE-acetaminophen (PERCOCET) 5-325 mg per tablet Take 1 tablet by mouth every 4 (four) hours as needed for Pain 40 tablet 0    prochlorperazine (COMPAZINE) 10 MG tablet Take 1 tablet (10 mg total) by mouth every 6 (six) hours as needed. (Patient taking differently: Take 10 mg by mouth every 6 (six) hours as needed (nausea).) 30 tablet 6    senna-docusate 8.6-50 mg (PERICOLACE) 8.6-50 mg per tablet Take 2 tablets by mouth 2 (two) times daily.      traZODone (DESYREL) 50 MG tablet Take 1 tablet (50 mg total) by mouth nightly. (Patient taking differently: Take 50 mg by mouth nightly as needed for Insomnia.) 30 tablet 2    valACYclovir (VALTREX) 500 MG tablet Take 1 tablet (500 mg total) by mouth 2 (two) times daily. 6 tablet 3    nystatin (MYCOSTATIN) 100,000 unit/mL suspension Take 4 mLs (400,000 Units total) by mouth 4 (four) times daily for 5 days 80 mL 0     No current facility-administered medications for this visit.       Review of Systems   Constitutional:  Negative for appetite change and unexpected weight change.   HENT:  Positive for sore throat. Negative for mouth sores.    Eyes:  Negative for visual disturbance.   Respiratory:  Negative for cough and shortness of breath.    Cardiovascular:  Negative for chest pain.   Gastrointestinal:  Negative for abdominal pain and diarrhea.   Genitourinary:  Negative for frequency.   Musculoskeletal:  Negative for back pain.   Skin:   "Negative for rash.   Neurological:  Negative for headaches.   Hematological:  Negative for adenopathy.   Psychiatric/Behavioral:  The patient is not nervous/anxious.      ECOG Performance Status:   ECOG SCORE    1 - Restricted in strenuous activity-ambulatory and able to carry out work of a light nature         Objective:      Vitals:   Vitals:    06/29/23 0915   BP: 110/82   BP Location: Right arm   Patient Position: Sitting   BP Method: Large (Manual)   Pulse: 93   Resp: 16   Temp: 96.4 °F (35.8 °C)   TempSrc: Temporal   SpO2: 97%   Weight: 127.8 kg (281 lb 12 oz)   Height: 5' 6" (1.676 m)         BMI: Body mass index is 45.48 kg/m².    Physical Exam  Vitals reviewed.   Constitutional:       General: She is not in acute distress.     Appearance: She is not diaphoretic.   HENT:      Head: Normocephalic and atraumatic.      Mouth/Throat:      Mouth: Mucous membranes are moist.      Pharynx: No posterior oropharyngeal erythema.   Eyes:      General: No scleral icterus.  Cardiovascular:      Rate and Rhythm: Normal rate and regular rhythm.      Heart sounds: Normal heart sounds. No murmur heard.  Pulmonary:      Effort: No respiratory distress.      Breath sounds: Normal breath sounds.   Abdominal:      General: There is no distension.      Tenderness: There is no right CVA tenderness or left CVA tenderness.   Musculoskeletal:      Right lower leg: No edema.      Left lower leg: No edema.   Skin:     General: Skin is warm.      Coloration: Skin is not jaundiced.   Neurological:      Mental Status: She is alert and oriented to person, place, and time.      Gait: Gait normal.   Psychiatric:         Mood and Affect: Mood normal.         Behavior: Behavior normal.       Laboratory Data:  Lab Results   Component Value Date    WBC 4.55 06/29/2023    HGB 14.5 06/29/2023    HCT 44.8 06/29/2023    MCV 95 06/29/2023     06/29/2023        Assessment:       1. Malignant neoplasm of sigmoid colon    2. Metastasis to " intestinal lymph node    3. Nausea    4. Diarrhea, unspecified type    5. Oral candidiasis    6. Mucositis due to chemotherapy    7. Transaminitis    8. Hypercalcemia    9. Multinodular goiter    10. Hypertension, unspecified type    11. 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 Oxaliplatin was stopped due to severe adverse reaction.  -Follow-up CTs and PET in May 2021 showed enlarging retroperitoneal node, concerning for metastatic disease.   -She started FOLFIRI + Avastin and has continued till July 2022.   -Given isolated RP toni disease, she has undergone open Left periaortic and aortocaval lymph node dissection on 7/12/2022.   -Due to progression on imaging, Dr. Santo discussed case with Dr. Montelongo at [facility]. Discussed resuming FOLFIRI-debo vs transitioning to BRAF directed therapy (BEACON). There may be an option to enroll in SWOG 2107 (encorafenib/cetuximab +/- nivo) at [facility] if no prior BRAF inhibitor exposure.[facility] is working with the patient's insurance to see if she can be enrolled there.    -Patient is interested in the trial and therefore we resumed FOLFIRI- Debo. 2 follow up scans show stable disease and we will continue. [facility] follow up note reviewed. No other recommendations at this time.   -Being treated with Xeloda due to  5FU shortage, tolerating poorly. Decision was made to switch back to FOLFIRI-debo as 5FU becomes available  -5/7/2023 shows relatively stable disease   -Proceed with FOLFIRI/Avastin 7/3/23  -Plan to re-image 8/2023  -Patient with plans to be out of town x 10 days in July and is requesting to cancel her 7/17/23 appointments. Keep appointment with Dr. Santo on 7/31/23    Nausea  -Continue PO  Ondansetron, Compazine PRN  -Using Sancuso patch PRN  -Well controled, monitor     Diarrhea   -Has improved, take Imodium PRN   -will monitor     Mucositis   -Continue [facility] solution PRN  -Will send Rx for Nystatin solution today for recent sore throat with one area of irritation, white film noted on left   -Will also start Claritin for postnasal drip/allergy symptoms    -Monitor     Transaminitis  -Fluctuates, with slight elevation noted today  -No obvious liver mets.   -Will monitor closely      Hypercalcemia   -Mild, secondary to hyperparathyroidism.    -Avoid calcium supplements.   -Monitor     Hypothyroidism  Multinodular goiter   -Continue Levothyroxine. Endocrine consult is appreciated.  -Had a non diagnostic biopsy in 2012, usg findings have remained stable.  - repeat thyroid US, stable, will follow up with Endocrine.    Essential HTN   -Continue antihypertensives  -B/P well controlled today   -Monitor     Anxiety   -Refill of Lorazepam   -Continue to  follow up with Dr. Palm and Dr. Pino    Patient queried and all questions were answered.  Assessment/Plan reviewed and approved by Dr. Santo   30 minutes were spent in coordination of patient's care, record review and counseling.      Route Chart for Scheduling    Med Onc Chart Routing      Follow up with physician Other. Cancel appointments on 7/17 as patient will be out of town, keep appiontments on 7/31 with Dr. Santo   Follow up with [facility]O    Infusion scheduling note   Ok to proceed with treatment 7/3/23; patient will be out of town 7/17/23 ok to cancel that appointment   Injection scheduling note    Labs    Imaging    Pharmacy appointment    Other referrals            Treatment Plan Information   OP COLORECTAL FOLFIRI + BEVACIZUMAB Q2W   Marah Santo MD   Upcoming Treatment Dates - OP COLORECTAL FOLFIRI + BEVACIZUMAB Q2W    7/3/2023       Pre-Medications       LORazepam injection 1 mg       promethazine (PHENERGAN) 6.25 mg in dextrose 5 % (D5W) 100  mL IVPB       palonosetron 0.25mg/dexAMETHasone 20mg in NS IVPB       Chemotherapy       bevacizumab-awwb (MVASI) 5 mg/kg = 680 mg in sodium chloride 0.9% 100 mL infusion       irinotecan (CAMPTOSAR) 440 mg in sodium chloride 0.9% 522 mL chemo infusion       leucovorin calcium 400 mg/m2 = 1,010 mg in dextrose 5 % (D5W) 250 mL infusion       fluorouracil (ADRUCIL) 2,400 mg/m2 = 6,050 mg in sodium chloride 0.9% 240 mL chemo infusion       Supportive Care       atropine injection 0.4 mg    Supportive Plan Information  IV FLUIDS AND ELECTROLYTES   Brayden Solo MD   Upcoming Treatment Dates - IV FLUIDS AND ELECTROLYTES    No upcoming days in selected categories.    Therapy Plan Information  PORT FLUSH  Flushes  heparin, porcine (PF) 100 unit/mL injection flush 500 Units  500 Units, Intravenous, Every visit  sodium chloride 0.9% flush 10 mL  10 mL, Intravenous, Every visit

## 2023-06-29 ENCOUNTER — LAB VISIT (OUTPATIENT)
Dept: LAB | Facility: HOSPITAL | Age: 53
End: 2023-06-29
Attending: INTERNAL MEDICINE
Payer: MEDICAID

## 2023-06-29 ENCOUNTER — OFFICE VISIT (OUTPATIENT)
Dept: HEMATOLOGY/ONCOLOGY | Facility: CLINIC | Age: 53
End: 2023-06-29
Payer: MEDICAID

## 2023-06-29 VITALS
DIASTOLIC BLOOD PRESSURE: 82 MMHG | BODY MASS INDEX: 45.28 KG/M2 | OXYGEN SATURATION: 97 % | HEART RATE: 93 BPM | SYSTOLIC BLOOD PRESSURE: 110 MMHG | TEMPERATURE: 96 F | RESPIRATION RATE: 16 BRPM | HEIGHT: 66 IN | WEIGHT: 281.75 LBS

## 2023-06-29 DIAGNOSIS — R11.0 NAUSEA: ICD-10-CM

## 2023-06-29 DIAGNOSIS — K12.31 MUCOSITIS DUE TO CHEMOTHERAPY: ICD-10-CM

## 2023-06-29 DIAGNOSIS — R19.7 DIARRHEA, UNSPECIFIED TYPE: ICD-10-CM

## 2023-06-29 DIAGNOSIS — E83.52 HYPERCALCEMIA: ICD-10-CM

## 2023-06-29 DIAGNOSIS — C18.7 MALIGNANT NEOPLASM OF SIGMOID COLON: Primary | ICD-10-CM

## 2023-06-29 DIAGNOSIS — B37.0 ORAL CANDIDIASIS: ICD-10-CM

## 2023-06-29 DIAGNOSIS — I10 HYPERTENSION, UNSPECIFIED TYPE: ICD-10-CM

## 2023-06-29 DIAGNOSIS — C18.7 MALIGNANT NEOPLASM OF SIGMOID COLON: ICD-10-CM

## 2023-06-29 DIAGNOSIS — C77.2 METASTASIS TO INTESTINAL LYMPH NODE: ICD-10-CM

## 2023-06-29 DIAGNOSIS — R74.01 TRANSAMINITIS: ICD-10-CM

## 2023-06-29 DIAGNOSIS — E04.2 MULTINODULAR GOITER: ICD-10-CM

## 2023-06-29 DIAGNOSIS — F41.9 ANXIETY: ICD-10-CM

## 2023-06-29 LAB
ALBUMIN SERPL BCP-MCNC: 3.8 G/DL (ref 3.5–5.2)
ALP SERPL-CCNC: 145 U/L (ref 55–135)
ALT SERPL W/O P-5'-P-CCNC: 65 U/L (ref 10–44)
ANION GAP SERPL CALC-SCNC: 12 MMOL/L (ref 8–16)
AST SERPL-CCNC: 44 U/L (ref 10–40)
BASOPHILS # BLD AUTO: 0.03 K/UL (ref 0–0.2)
BASOPHILS NFR BLD: 0.7 % (ref 0–1.9)
BILIRUB SERPL-MCNC: 0.5 MG/DL (ref 0.1–1)
BILIRUB UR QL STRIP: NEGATIVE
BUN SERPL-MCNC: 16 MG/DL (ref 6–20)
CALCIUM SERPL-MCNC: 10.9 MG/DL (ref 8.7–10.5)
CHLORIDE SERPL-SCNC: 107 MMOL/L (ref 95–110)
CLARITY UR: CLEAR
CO2 SERPL-SCNC: 22 MMOL/L (ref 23–29)
COLOR UR: YELLOW
CREAT SERPL-MCNC: 1 MG/DL (ref 0.5–1.4)
DIFFERENTIAL METHOD: ABNORMAL
EOSINOPHIL # BLD AUTO: 0.2 K/UL (ref 0–0.5)
EOSINOPHIL NFR BLD: 4.8 % (ref 0–8)
ERYTHROCYTE [DISTWIDTH] IN BLOOD BY AUTOMATED COUNT: 16.3 % (ref 11.5–14.5)
EST. GFR  (NO RACE VARIABLE): >60 ML/MIN/1.73 M^2
GLUCOSE SERPL-MCNC: 108 MG/DL (ref 70–110)
GLUCOSE UR QL STRIP: NEGATIVE
HCT VFR BLD AUTO: 44.8 % (ref 37–48.5)
HGB BLD-MCNC: 14.5 G/DL (ref 12–16)
HGB UR QL STRIP: NEGATIVE
IMM GRANULOCYTES # BLD AUTO: 0.02 K/UL (ref 0–0.04)
IMM GRANULOCYTES NFR BLD AUTO: 0.4 % (ref 0–0.5)
KETONES UR QL STRIP: NEGATIVE
LEUKOCYTE ESTERASE UR QL STRIP: NEGATIVE
LYMPHOCYTES # BLD AUTO: 1.5 K/UL (ref 1–4.8)
LYMPHOCYTES NFR BLD: 33.6 % (ref 18–48)
MCH RBC QN AUTO: 30.7 PG (ref 27–31)
MCHC RBC AUTO-ENTMCNC: 32.4 G/DL (ref 32–36)
MCV RBC AUTO: 95 FL (ref 82–98)
MONOCYTES # BLD AUTO: 0.6 K/UL (ref 0.3–1)
MONOCYTES NFR BLD: 12.5 % (ref 4–15)
NEUTROPHILS # BLD AUTO: 2.2 K/UL (ref 1.8–7.7)
NEUTROPHILS NFR BLD: 48 % (ref 38–73)
NITRITE UR QL STRIP: NEGATIVE
NRBC BLD-RTO: 0 /100 WBC
PH UR STRIP: 6 [PH] (ref 5–8)
PLATELET # BLD AUTO: 272 K/UL (ref 150–450)
PMV BLD AUTO: 10 FL (ref 9.2–12.9)
POTASSIUM SERPL-SCNC: 4 MMOL/L (ref 3.5–5.1)
PROT SERPL-MCNC: 7.2 G/DL (ref 6–8.4)
PROT UR QL STRIP: NEGATIVE
RBC # BLD AUTO: 4.72 M/UL (ref 4–5.4)
SODIUM SERPL-SCNC: 141 MMOL/L (ref 136–145)
SP GR UR STRIP: >=1.03 (ref 1–1.03)
URN SPEC COLLECT METH UR: ABNORMAL
WBC # BLD AUTO: 4.55 K/UL (ref 3.9–12.7)

## 2023-06-29 PROCEDURE — 99999 PR PBB SHADOW E&M-EST. PATIENT-LVL IV: ICD-10-PCS | Mod: PBBFAC,,,

## 2023-06-29 PROCEDURE — 36415 COLL VENOUS BLD VENIPUNCTURE: CPT | Mod: PN | Performed by: INTERNAL MEDICINE

## 2023-06-29 PROCEDURE — 85025 COMPLETE CBC W/AUTO DIFF WBC: CPT | Mod: PN | Performed by: INTERNAL MEDICINE

## 2023-06-29 PROCEDURE — 81003 URINALYSIS AUTO W/O SCOPE: CPT | Mod: PN | Performed by: INTERNAL MEDICINE

## 2023-06-29 PROCEDURE — 3079F PR MOST RECENT DIASTOLIC BLOOD PRESSURE 80-89 MM HG: ICD-10-PCS | Mod: CPTII,,,

## 2023-06-29 PROCEDURE — 3008F PR BODY MASS INDEX (BMI) DOCUMENTED: ICD-10-PCS | Mod: CPTII,,,

## 2023-06-29 PROCEDURE — 99214 OFFICE O/P EST MOD 30 MIN: CPT | Mod: S$PBB,,,

## 2023-06-29 PROCEDURE — 99214 OFFICE O/P EST MOD 30 MIN: CPT | Mod: PBBFAC,PN

## 2023-06-29 PROCEDURE — 4010F ACE/ARB THERAPY RXD/TAKEN: CPT | Mod: CPTII,,,

## 2023-06-29 PROCEDURE — 4010F PR ACE/ARB THEARPY RXD/TAKEN: ICD-10-PCS | Mod: CPTII,,,

## 2023-06-29 PROCEDURE — 1159F PR MEDICATION LIST DOCUMENTED IN MEDICAL RECORD: ICD-10-PCS | Mod: CPTII,,,

## 2023-06-29 PROCEDURE — 80053 COMPREHEN METABOLIC PANEL: CPT | Mod: PN | Performed by: INTERNAL MEDICINE

## 2023-06-29 PROCEDURE — 99214 PR OFFICE/OUTPT VISIT, EST, LEVL IV, 30-39 MIN: ICD-10-PCS | Mod: S$PBB,,,

## 2023-06-29 PROCEDURE — 3008F BODY MASS INDEX DOCD: CPT | Mod: CPTII,,,

## 2023-06-29 PROCEDURE — 1159F MED LIST DOCD IN RCRD: CPT | Mod: CPTII,,,

## 2023-06-29 PROCEDURE — 99999 PR PBB SHADOW E&M-EST. PATIENT-LVL IV: CPT | Mod: PBBFAC,,,

## 2023-06-29 PROCEDURE — 3079F DIAST BP 80-89 MM HG: CPT | Mod: CPTII,,,

## 2023-06-29 PROCEDURE — 3074F PR MOST RECENT SYSTOLIC BLOOD PRESSURE < 130 MM HG: ICD-10-PCS | Mod: CPTII,,,

## 2023-06-29 PROCEDURE — 3074F SYST BP LT 130 MM HG: CPT | Mod: CPTII,,,

## 2023-06-29 RX ORDER — LORAZEPAM 2 MG/ML
1 INJECTION INTRAMUSCULAR
Status: CANCELLED | OUTPATIENT
Start: 2023-07-03 | End: 2023-07-03

## 2023-06-29 RX ORDER — HEPARIN 100 UNIT/ML
500 SYRINGE INTRAVENOUS
Status: CANCELLED | OUTPATIENT
Start: 2023-07-03

## 2023-06-29 RX ORDER — SODIUM CHLORIDE 0.9 % (FLUSH) 0.9 %
10 SYRINGE (ML) INJECTION
Status: CANCELLED | OUTPATIENT
Start: 2023-07-06

## 2023-06-29 RX ORDER — ATROPINE SULFATE 0.4 MG/ML
0.4 INJECTION, SOLUTION ENDOTRACHEAL; INTRAMEDULLARY; INTRAMUSCULAR; INTRAVENOUS; SUBCUTANEOUS ONCE AS NEEDED
Status: CANCELLED | OUTPATIENT
Start: 2023-07-03

## 2023-06-29 RX ORDER — HEPARIN 100 UNIT/ML
500 SYRINGE INTRAVENOUS
Status: CANCELLED | OUTPATIENT
Start: 2023-07-06

## 2023-06-29 RX ORDER — NYSTATIN 100000 [USP'U]/ML
4 SUSPENSION ORAL 4 TIMES DAILY
Qty: 80 ML | Refills: 0 | Status: SHIPPED | OUTPATIENT
Start: 2023-06-29 | End: 2023-07-04

## 2023-06-29 RX ORDER — SODIUM CHLORIDE 0.9 % (FLUSH) 0.9 %
10 SYRINGE (ML) INJECTION
Status: CANCELLED | OUTPATIENT
Start: 2023-07-03

## 2023-06-29 RX ORDER — SODIUM CHLORIDE 9 MG/ML
1000 INJECTION, SOLUTION INTRAVENOUS
Status: CANCELLED | OUTPATIENT
Start: 2023-07-06

## 2023-07-02 ENCOUNTER — PATIENT MESSAGE (OUTPATIENT)
Dept: HEMATOLOGY/ONCOLOGY | Facility: CLINIC | Age: 53
End: 2023-07-02
Payer: MEDICAID

## 2023-07-03 DIAGNOSIS — K12.31 MUCOSITIS DUE TO CHEMOTHERAPY: Primary | ICD-10-CM

## 2023-07-03 RX ORDER — FLUCONAZOLE 150 MG/1
150 TABLET ORAL DAILY
Qty: 2 TABLET | Refills: 0 | Status: SHIPPED | OUTPATIENT
Start: 2023-07-03 | End: 2023-07-05

## 2023-07-05 ENCOUNTER — INFUSION (OUTPATIENT)
Dept: INFUSION THERAPY | Facility: HOSPITAL | Age: 53
End: 2023-07-05
Attending: INTERNAL MEDICINE
Payer: MEDICAID

## 2023-07-05 ENCOUNTER — DOCUMENTATION ONLY (OUTPATIENT)
Dept: INFUSION THERAPY | Facility: HOSPITAL | Age: 53
End: 2023-07-05
Payer: MEDICAID

## 2023-07-05 VITALS
RESPIRATION RATE: 18 BRPM | SYSTOLIC BLOOD PRESSURE: 116 MMHG | TEMPERATURE: 98 F | BODY MASS INDEX: 45.28 KG/M2 | WEIGHT: 281.75 LBS | HEIGHT: 66 IN | DIASTOLIC BLOOD PRESSURE: 82 MMHG | HEART RATE: 100 BPM

## 2023-07-05 DIAGNOSIS — C18.7 MALIGNANT NEOPLASM OF SIGMOID COLON: ICD-10-CM

## 2023-07-05 DIAGNOSIS — C77.2 METASTASIS TO INTESTINAL LYMPH NODE: Primary | ICD-10-CM

## 2023-07-05 PROCEDURE — 96416 CHEMO PROLONG INFUSE W/PUMP: CPT | Mod: PN

## 2023-07-05 PROCEDURE — 96368 THER/DIAG CONCURRENT INF: CPT | Mod: PN

## 2023-07-05 PROCEDURE — 96367 TX/PROPH/DG ADDL SEQ IV INF: CPT | Mod: PN

## 2023-07-05 PROCEDURE — 63600175 PHARM REV CODE 636 W HCPCS: Mod: PN

## 2023-07-05 PROCEDURE — 96417 CHEMO IV INFUS EACH ADDL SEQ: CPT | Mod: PN

## 2023-07-05 PROCEDURE — 96375 TX/PRO/DX INJ NEW DRUG ADDON: CPT | Mod: PN

## 2023-07-05 PROCEDURE — 96413 CHEMO IV INFUSION 1 HR: CPT | Mod: PN

## 2023-07-05 PROCEDURE — 25000003 PHARM REV CODE 250: Mod: PN

## 2023-07-05 RX ORDER — LORAZEPAM 2 MG/ML
1 INJECTION INTRAMUSCULAR
Status: COMPLETED | OUTPATIENT
Start: 2023-07-05 | End: 2023-07-05

## 2023-07-05 RX ORDER — ATROPINE SULFATE 0.4 MG/ML
0.4 INJECTION, SOLUTION ENDOTRACHEAL; INTRAMEDULLARY; INTRAMUSCULAR; INTRAVENOUS; SUBCUTANEOUS ONCE AS NEEDED
Status: COMPLETED | OUTPATIENT
Start: 2023-07-05 | End: 2023-07-05

## 2023-07-05 RX ORDER — SODIUM CHLORIDE 0.9 % (FLUSH) 0.9 %
10 SYRINGE (ML) INJECTION
Status: DISCONTINUED | OUTPATIENT
Start: 2023-07-05 | End: 2023-07-05 | Stop reason: HOSPADM

## 2023-07-05 RX ORDER — HEPARIN 100 UNIT/ML
500 SYRINGE INTRAVENOUS
Status: DISCONTINUED | OUTPATIENT
Start: 2023-07-05 | End: 2023-07-05 | Stop reason: HOSPADM

## 2023-07-05 RX ADMIN — SODIUM CHLORIDE 440 MG: 9 INJECTION, SOLUTION INTRAVENOUS at 01:07

## 2023-07-05 RX ADMIN — LORAZEPAM 1 MG: 2 INJECTION INTRAMUSCULAR; INTRAVENOUS at 11:07

## 2023-07-05 RX ADMIN — LEUCOVORIN CALCIUM 1010 MG: 350 INJECTION, POWDER, LYOPHILIZED, FOR SOLUTION INTRAMUSCULAR; INTRAVENOUS at 01:07

## 2023-07-05 RX ADMIN — FLUOROURACIL 6000 MG: 50 INJECTION, SOLUTION INTRAVENOUS at 02:07

## 2023-07-05 RX ADMIN — DEXAMETHASONE SODIUM PHOSPHATE 20 MG: 10 INJECTION, SOLUTION INTRAMUSCULAR; INTRAVENOUS at 10:07

## 2023-07-05 RX ADMIN — ATROPINE SULFATE 0.4 MG: 0.4 INJECTION, SOLUTION INTRAVENOUS at 12:07

## 2023-07-05 RX ADMIN — PROMETHAZINE HYDROCHLORIDE 6.25 MG: 25 INJECTION INTRAMUSCULAR; INTRAVENOUS at 12:07

## 2023-07-05 RX ADMIN — BEVACIZUMAB-AWWB 680 MG: 400 INJECTION, SOLUTION INTRAVENOUS at 11:07

## 2023-07-05 RX ADMIN — SODIUM CHLORIDE: 9 INJECTION, SOLUTION INTRAVENOUS at 10:07

## 2023-07-05 NOTE — PROGRESS NOTES
Oncology Nutrition   Gail Mckinnon   1970    [employer]     Pt eligible for [employer] . Order filled out with patient at chairside. All patient questions or concerns regarding  and/or nutrition status addressed. RD to continue to monitor prn and provide patient food while under active treatment.     Food order filled by this RD- will provide to patient at end of infusion today.    Ciera Richardson, DAVIDN, LDN  07/05/2023  3:53 PM

## 2023-07-06 ENCOUNTER — PATIENT MESSAGE (OUTPATIENT)
Dept: ADMINISTRATIVE | Facility: OTHER | Age: 53
End: 2023-07-06
Payer: MEDICAID

## 2023-07-07 ENCOUNTER — INFUSION (OUTPATIENT)
Dept: INFUSION THERAPY | Facility: HOSPITAL | Age: 53
End: 2023-07-07
Attending: INTERNAL MEDICINE
Payer: MEDICAID

## 2023-07-07 VITALS
SYSTOLIC BLOOD PRESSURE: 135 MMHG | TEMPERATURE: 98 F | HEART RATE: 85 BPM | WEIGHT: 281.75 LBS | BODY MASS INDEX: 45.28 KG/M2 | HEIGHT: 66 IN | DIASTOLIC BLOOD PRESSURE: 63 MMHG | RESPIRATION RATE: 17 BRPM

## 2023-07-07 DIAGNOSIS — C77.2 METASTASIS TO INTESTINAL LYMPH NODE: Primary | ICD-10-CM

## 2023-07-07 DIAGNOSIS — C18.7 MALIGNANT NEOPLASM OF SIGMOID COLON: ICD-10-CM

## 2023-07-07 PROCEDURE — 63600175 PHARM REV CODE 636 W HCPCS: Mod: PN

## 2023-07-07 PROCEDURE — 96360 HYDRATION IV INFUSION INIT: CPT | Mod: PN

## 2023-07-07 PROCEDURE — 96361 HYDRATE IV INFUSION ADD-ON: CPT | Mod: PN

## 2023-07-07 PROCEDURE — 25000003 PHARM REV CODE 250: Mod: PN

## 2023-07-07 RX ORDER — SODIUM CHLORIDE 9 MG/ML
1000 INJECTION, SOLUTION INTRAVENOUS
Status: DISCONTINUED | OUTPATIENT
Start: 2023-07-07 | End: 2023-07-07 | Stop reason: HOSPADM

## 2023-07-07 RX ORDER — SODIUM CHLORIDE 0.9 % (FLUSH) 0.9 %
10 SYRINGE (ML) INJECTION
Status: DISCONTINUED | OUTPATIENT
Start: 2023-07-07 | End: 2023-07-07 | Stop reason: HOSPADM

## 2023-07-07 RX ORDER — HEPARIN 100 UNIT/ML
500 SYRINGE INTRAVENOUS
Status: DISCONTINUED | OUTPATIENT
Start: 2023-07-07 | End: 2023-07-07 | Stop reason: HOSPADM

## 2023-07-07 RX ADMIN — SODIUM CHLORIDE 1000 ML: 9 INJECTION, SOLUTION INTRAVENOUS at 10:07

## 2023-07-07 RX ADMIN — HEPARIN 500 UNITS: 100 SYRINGE at 01:07

## 2023-07-07 NOTE — PLAN OF CARE
Problem: Adult Inpatient Plan of Care  Goal: Plan of Care Review  Outcome: Ongoing, Progressing  Flowsheets (Taken 7/7/2023 0697)  Plan of Care Reviewed With: patient   Pt tolerated hydration well. Pump d/c'd. No adverse reaction noted.   PAD flushed with heparin and de-accessed per protocol.  Patient left clinic in no acute distress.

## 2023-07-07 NOTE — PLAN OF CARE
Problem: Adult Inpatient Plan of Care  Goal: Patient-Specific Goal (Individualized)  Outcome: Ongoing, Progressing  Flowsheets (Taken 7/7/2023 1056)  Anxieties, Fears or Concerns: none  Individualized Care Needs: recliner,warm blanket     Problem: Fatigue  Goal: Improved Activity Tolerance  Outcome: Ongoing, Progressing  Intervention: Promote Improved Energy  Flowsheets (Taken 7/7/2023 1056)  Fatigue Management: frequent rest breaks encouraged  Sleep/Rest Enhancement: relaxation techniques promoted  Activity Management: Ambulated -L4

## 2023-07-11 ENCOUNTER — PATIENT MESSAGE (OUTPATIENT)
Dept: PSYCHIATRY | Facility: CLINIC | Age: 53
End: 2023-07-11
Payer: MEDICAID

## 2023-07-26 ENCOUNTER — PATIENT MESSAGE (OUTPATIENT)
Dept: HEMATOLOGY/ONCOLOGY | Facility: CLINIC | Age: 53
End: 2023-07-26
Payer: MEDICAID

## 2023-07-26 DIAGNOSIS — N39.0 URINARY TRACT INFECTION WITHOUT HEMATURIA, SITE UNSPECIFIED: Primary | ICD-10-CM

## 2023-07-26 RX ORDER — CIPROFLOXACIN 500 MG/1
500 TABLET ORAL 2 TIMES DAILY
Qty: 10 TABLET | Refills: 0 | Status: SHIPPED | OUTPATIENT
Start: 2023-07-26 | End: 2023-07-31 | Stop reason: ALTCHOICE

## 2023-07-31 ENCOUNTER — INFUSION (OUTPATIENT)
Dept: INFUSION THERAPY | Facility: HOSPITAL | Age: 53
End: 2023-07-31
Attending: INTERNAL MEDICINE
Payer: MEDICAID

## 2023-07-31 ENCOUNTER — DOCUMENTATION ONLY (OUTPATIENT)
Dept: INFUSION THERAPY | Facility: HOSPITAL | Age: 53
End: 2023-07-31
Payer: MEDICAID

## 2023-07-31 ENCOUNTER — OFFICE VISIT (OUTPATIENT)
Dept: HEMATOLOGY/ONCOLOGY | Facility: CLINIC | Age: 53
End: 2023-07-31
Payer: MEDICAID

## 2023-07-31 VITALS
TEMPERATURE: 97 F | RESPIRATION RATE: 16 BRPM | OXYGEN SATURATION: 97 % | WEIGHT: 285.5 LBS | HEIGHT: 66 IN | DIASTOLIC BLOOD PRESSURE: 80 MMHG | BODY MASS INDEX: 45.88 KG/M2 | HEART RATE: 91 BPM | SYSTOLIC BLOOD PRESSURE: 149 MMHG

## 2023-07-31 VITALS
DIASTOLIC BLOOD PRESSURE: 68 MMHG | SYSTOLIC BLOOD PRESSURE: 108 MMHG | TEMPERATURE: 97 F | BODY MASS INDEX: 45.88 KG/M2 | HEART RATE: 93 BPM | HEIGHT: 66 IN | RESPIRATION RATE: 16 BRPM | OXYGEN SATURATION: 97 % | WEIGHT: 285.5 LBS

## 2023-07-31 DIAGNOSIS — E83.52 HYPERCALCEMIA: ICD-10-CM

## 2023-07-31 DIAGNOSIS — F41.9 ANXIETY: ICD-10-CM

## 2023-07-31 DIAGNOSIS — C77.2 METASTASIS TO INTESTINAL LYMPH NODE: ICD-10-CM

## 2023-07-31 DIAGNOSIS — C18.7 MALIGNANT NEOPLASM OF SIGMOID COLON: ICD-10-CM

## 2023-07-31 DIAGNOSIS — R11.0 NAUSEA: ICD-10-CM

## 2023-07-31 DIAGNOSIS — C77.2 METASTASIS TO INTESTINAL LYMPH NODE: Primary | ICD-10-CM

## 2023-07-31 DIAGNOSIS — N39.0 URINARY TRACT INFECTION WITHOUT HEMATURIA, SITE UNSPECIFIED: ICD-10-CM

## 2023-07-31 DIAGNOSIS — C18.7 MALIGNANT NEOPLASM OF SIGMOID COLON: Primary | ICD-10-CM

## 2023-07-31 PROCEDURE — 96368 THER/DIAG CONCURRENT INF: CPT | Mod: PN

## 2023-07-31 PROCEDURE — 3008F BODY MASS INDEX DOCD: CPT | Mod: CPTII,,, | Performed by: INTERNAL MEDICINE

## 2023-07-31 PROCEDURE — 99215 OFFICE O/P EST HI 40 MIN: CPT | Mod: S$PBB,,, | Performed by: INTERNAL MEDICINE

## 2023-07-31 PROCEDURE — 99215 PR OFFICE/OUTPT VISIT, EST, LEVL V, 40-54 MIN: ICD-10-PCS | Mod: S$PBB,,, | Performed by: INTERNAL MEDICINE

## 2023-07-31 PROCEDURE — 96367 TX/PROPH/DG ADDL SEQ IV INF: CPT | Mod: PN

## 2023-07-31 PROCEDURE — 4010F ACE/ARB THERAPY RXD/TAKEN: CPT | Mod: CPTII,,, | Performed by: INTERNAL MEDICINE

## 2023-07-31 PROCEDURE — 3078F DIAST BP <80 MM HG: CPT | Mod: CPTII,,, | Performed by: INTERNAL MEDICINE

## 2023-07-31 PROCEDURE — 25000003 PHARM REV CODE 250: Mod: PN | Performed by: INTERNAL MEDICINE

## 2023-07-31 PROCEDURE — 96416 CHEMO PROLONG INFUSE W/PUMP: CPT | Mod: PN

## 2023-07-31 PROCEDURE — 99999 PR PBB SHADOW E&M-EST. PATIENT-LVL IV: ICD-10-PCS | Mod: PBBFAC,,, | Performed by: INTERNAL MEDICINE

## 2023-07-31 PROCEDURE — 96417 CHEMO IV INFUS EACH ADDL SEQ: CPT | Mod: PN

## 2023-07-31 PROCEDURE — 99214 OFFICE O/P EST MOD 30 MIN: CPT | Mod: PBBFAC,25,PN | Performed by: INTERNAL MEDICINE

## 2023-07-31 PROCEDURE — 63600175 PHARM REV CODE 636 W HCPCS: Mod: JZ,JG,PN | Performed by: INTERNAL MEDICINE

## 2023-07-31 PROCEDURE — 3078F PR MOST RECENT DIASTOLIC BLOOD PRESSURE < 80 MM HG: ICD-10-PCS | Mod: CPTII,,, | Performed by: INTERNAL MEDICINE

## 2023-07-31 PROCEDURE — 3074F SYST BP LT 130 MM HG: CPT | Mod: CPTII,,, | Performed by: INTERNAL MEDICINE

## 2023-07-31 PROCEDURE — 4010F PR ACE/ARB THEARPY RXD/TAKEN: ICD-10-PCS | Mod: CPTII,,, | Performed by: INTERNAL MEDICINE

## 2023-07-31 PROCEDURE — 3008F PR BODY MASS INDEX (BMI) DOCUMENTED: ICD-10-PCS | Mod: CPTII,,, | Performed by: INTERNAL MEDICINE

## 2023-07-31 PROCEDURE — 3074F PR MOST RECENT SYSTOLIC BLOOD PRESSURE < 130 MM HG: ICD-10-PCS | Mod: CPTII,,, | Performed by: INTERNAL MEDICINE

## 2023-07-31 PROCEDURE — 96413 CHEMO IV INFUSION 1 HR: CPT | Mod: PN

## 2023-07-31 PROCEDURE — 99999 PR PBB SHADOW E&M-EST. PATIENT-LVL IV: CPT | Mod: PBBFAC,,, | Performed by: INTERNAL MEDICINE

## 2023-07-31 PROCEDURE — 96375 TX/PRO/DX INJ NEW DRUG ADDON: CPT | Mod: PN

## 2023-07-31 RX ORDER — SODIUM CHLORIDE 0.9 % (FLUSH) 0.9 %
10 SYRINGE (ML) INJECTION
Status: CANCELLED | OUTPATIENT
Start: 2023-08-01

## 2023-07-31 RX ORDER — HEPARIN 100 UNIT/ML
500 SYRINGE INTRAVENOUS
Status: CANCELLED | OUTPATIENT
Start: 2023-07-31

## 2023-07-31 RX ORDER — HEPARIN 100 UNIT/ML
500 SYRINGE INTRAVENOUS
Status: DISCONTINUED | OUTPATIENT
Start: 2023-07-31 | End: 2023-07-31 | Stop reason: HOSPADM

## 2023-07-31 RX ORDER — LORAZEPAM 2 MG/ML
1 INJECTION INTRAMUSCULAR
Status: COMPLETED | OUTPATIENT
Start: 2023-07-31 | End: 2023-07-31

## 2023-07-31 RX ORDER — ATROPINE SULFATE 0.4 MG/ML
0.4 INJECTION, SOLUTION ENDOTRACHEAL; INTRAMEDULLARY; INTRAMUSCULAR; INTRAVENOUS; SUBCUTANEOUS ONCE AS NEEDED
Status: COMPLETED | OUTPATIENT
Start: 2023-07-31 | End: 2023-07-31

## 2023-07-31 RX ORDER — HEPARIN 100 UNIT/ML
500 SYRINGE INTRAVENOUS
Status: CANCELLED | OUTPATIENT
Start: 2023-08-01

## 2023-07-31 RX ORDER — SODIUM CHLORIDE 9 MG/ML
1000 INJECTION, SOLUTION INTRAVENOUS
Status: CANCELLED | OUTPATIENT
Start: 2023-08-01

## 2023-07-31 RX ORDER — SODIUM CHLORIDE 0.9 % (FLUSH) 0.9 %
10 SYRINGE (ML) INJECTION
Status: DISCONTINUED | OUTPATIENT
Start: 2023-07-31 | End: 2023-07-31 | Stop reason: HOSPADM

## 2023-07-31 RX ORDER — SODIUM CHLORIDE 0.9 % (FLUSH) 0.9 %
10 SYRINGE (ML) INJECTION
Status: CANCELLED | OUTPATIENT
Start: 2023-07-31

## 2023-07-31 RX ORDER — ATROPINE SULFATE 0.4 MG/ML
0.4 INJECTION, SOLUTION ENDOTRACHEAL; INTRAMEDULLARY; INTRAMUSCULAR; INTRAVENOUS; SUBCUTANEOUS ONCE AS NEEDED
Status: CANCELLED | OUTPATIENT
Start: 2023-07-31 | End: 2023-07-31

## 2023-07-31 RX ADMIN — SODIUM CHLORIDE: 9 INJECTION, SOLUTION INTRAVENOUS at 10:07

## 2023-07-31 RX ADMIN — SODIUM CHLORIDE 440 MG: 9 INJECTION, SOLUTION INTRAVENOUS at 12:07

## 2023-07-31 RX ADMIN — LEUCOVORIN CALCIUM 1010 MG: 350 INJECTION, POWDER, LYOPHILIZED, FOR SOLUTION INTRAMUSCULAR; INTRAVENOUS at 12:07

## 2023-07-31 RX ADMIN — PALONOSETRON 0.25 MG: 0.05 INJECTION, SOLUTION INTRAVENOUS at 11:07

## 2023-07-31 RX ADMIN — BEVACIZUMAB-AWWB 680 MG: 400 INJECTION, SOLUTION INTRAVENOUS at 11:07

## 2023-07-31 RX ADMIN — FLUOROURACIL 6000 MG: 50 INJECTION, SOLUTION INTRAVENOUS at 02:07

## 2023-07-31 RX ADMIN — ATROPINE SULFATE 0.4 MG: 0.4 INJECTION, SOLUTION INTRAVENOUS at 12:07

## 2023-07-31 RX ADMIN — PROMETHAZINE HYDROCHLORIDE 6.25 MG: 25 INJECTION INTRAMUSCULAR; INTRAVENOUS at 12:07

## 2023-07-31 RX ADMIN — LORAZEPAM 1 MG: 2 INJECTION INTRAMUSCULAR; INTRAVENOUS at 10:07

## 2023-07-31 NOTE — PROGRESS NOTES
Oncology Nutrition   Chemotherapy Infusion Visit    Nutrition Follow Up   RD met with pt at chairside during infusion tx. Pt continues to do well nutritionally - maintaining weight, chewing without difficulty and denies any nutrition related side effects.      Pt eligible for TFP order. Pt states she would like to  her order later this week. RD agreeable.       Wt Readings from Last 10 Encounters:   07/31/23 129.5 kg (285 lb 7.9 oz)   07/31/23 129.5 kg (285 lb 7.9 oz)   07/07/23 127.8 kg (281 lb 12 oz)   07/05/23 127.8 kg (281 lb 12 oz)   06/29/23 127.8 kg (281 lb 12 oz)   06/21/23 128.9 kg (284 lb 2.8 oz)   06/19/23 128.9 kg (284 lb 2.8 oz)   06/19/23 128.9 kg (284 lb 2.8 oz)   06/07/23 129 kg (284 lb 6.3 oz)   06/05/23 129 kg (284 lb 6.3 oz)       All other nutrition questions/concerns addressed as appropriate. Will continue to monitor prn throughout treatment.     Ciera Richardson RDN, LDN  07/31/2023  1:52 PM

## 2023-07-31 NOTE — PLAN OF CARE
Pt arrived to clinic for Avastin and Folfiri treatments and tolerated well with no changes throughout therapy. Pt educated on line care at home and aware of number to call for any pump issues. Pt with chemo spill kit and aware of how to use if needed. Pt aware of side effects of meds and aware of f/u appts and discharged to home in NAD with 5FU pump infusing with ease.

## 2023-07-31 NOTE — PROGRESS NOTES
PROGRESS NOTE    Subjective:       Patient ID: Gail Mckinnon is a 53 y.o. female.  MRN: [id number]  : 1970    Chief Complaint: Malignant neoplasm of sigmoid colon (Follow Up, labs and tx today)      History of Present Illness:   Gail Mckinnon is a 53 y.o. female who presents with colon cancer, initially stage III and now with LN recurrence.    On FOLIRI+ Avastin sine .     She was briefly switched to xeloda due to 5 FU shortage for a month. Did not tolerate Xeloda well due hand foot syndrome, blistering of feet, did not take the last day of Xeloda. Completed .     Resumed Folfiri + Avastin on 23. Atropine was held due to prior constipation.     Was admitted to the hospital from -23 for intractable nausea , vomiting and diarrhea as well as abdominal pain. No hematochezia or dark stool was noted.      US showed gallbladder stone and dilated CBD. She was managed conservatively. Had CT abd, pelvis, colonoscopy and MRCP that were relatively unremarkable without signs of cholecystitis. Cholecystectomy was not recommended.     She had recently resumed FOLFIRI and the symptoms started after the first cycle of resuming, never had issues prior to this.  Stayed in the hospital for 10 days.  C diff was negative. No other etiology was established. Symptoms improved over time and she was discharged on .     Interim history:  Here to obtain clearance for ongoing FOLFIRI+Avastin.   Tolerated the most recent cycle relatively well. Sancuso patch is helping with nausea.   Developed fever and dysuria last week, was out of town and ciprofloxacin was sent in for 5 days, symptoms and fever have improved now.   Dealing with constipation, started lactulose yesterday, not relieved all the way.    Oncology History:  She completed adjuvant FOLFOX in 2020. She tolerated only 4 cycles of FOLFOX before she developed an infusion reaction  to oxaliplatin in cycle 5 was well as cycle 6. She then completed 6 cycles of infusional 5 FU.     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 She was offered second line therapy with FOLFIRI and Avastin.      She presented to the ED on 11/26 with left flank pain. CT renal stone study showed Moderate hydronephrosis on the left secondary to 3 mm calculus at the UPJ.    She had a PET scan mid April that showed possible progression of her disease with      She has been to [facility] for another opinion. No change was recommended in systemic therapy but she was offered surgical removal of the aorta caval nodes.  Recent sans at [facility]  show stable disease.      Surgery done 7/12/22. Received 2 more cycle of FOLFIRI without Avastin.     She had repeat imaging at [facility] on 9/24/22 that unfortunately showed disease progression since her surgery with multiple RP nodes and one mediastinal node.       PET 12/8/22  Impression:     1. Progression of disease with interval increase of abdominal and pelvic lymphadenopathy.  2. New area of moderate FDG uptake at the right half of the T9 vertebral body (image 124).  Favor degenerative disc changes.  No underlying osteolytic or osteoblastic lesion.  Recommend continued attention on follow-up.  3. No other evidence of FDG avid disease.     12/22/22  Impression After scan comparison:     1. Metastatic portacaval and left periaortic retroperitoneal lymph nodes which remain stable between the CT of 09/24/2022 and the subsequent PET-CT of 12/08/2022.  There is no evidence of progression of metastatic disease.  2. Nonobstructing bilateral renal calculi and cholelithiasis.    2/10/22:  CT chest abd pelvis  Impression:     Stable periportal, retroperitoneal and mesenteric lymphadenopathy compared to PET-CT 12/08/2022.     Stable right middle lobe 3 mm pulmonary nodule.  No new or enlarging pulmonary nodule.     Hepatic steatosis.  Hepatosplenomegaly.     Cholelithiasis.      Bilateral nonobstructing nephrolithiasis.     Small hiatal hernia with retained contrast in the distal esophagus.  Correlate for reflux.    She had virtual visit at [facility] on 2/17/23.     She has continued FOLFIRI and Avastin.     CT abd pelvis   5/7/23  Impression:     1. Mesenteric inflammatory changes have worsened since the prior study.  2. Mesenteric, retroperitoneal and left-sided pelvic enlarged lymph nodes are unchanged.       Oncology History:  Oncology History   Malignant neoplasm of sigmoid colon   3/16/2020 Initial Diagnosis    Malignant neoplasm of sigmoid colon     3/31/2020 Cancer Staged    Staging form: Colon and Rectum, AJCC 8th Edition  - Clinical stage from 3/31/2020: Stage IIIC (cT4b, cN2a, cM0)     5/6/2020 - 11/17/2020 Chemotherapy    Treatment Summary   Plan Name: OP FOLFOX 6 Q2W  Treatment Goal: Curative  Status: Inactive  Start Date: 5/6/2020  End Date: 11/6/2020  Provider: Dylan Leyva MD  Chemotherapy: fluorouraciL injection 945 mg, 400 mg/m2 = 945 mg, Intravenous, Clinic/[facility] 1 time, 14 of 14 cycles  Administration: 945 mg (5/6/2020), 945 mg (5/20/2020), 945 mg (6/3/2020), 945 mg (6/17/2020), 945 mg (7/29/2020), 945 mg (8/12/2020), 945 mg (8/26/2020), 945 mg (9/9/2020), 945 mg (9/23/2020), 945 mg (10/6/2020), 945 mg (10/21/2020), 945 mg (11/4/2020)  fluorouraciL 2,400 mg/m2 = 5,665 mg in sodium chloride 0.9% 240 mL chemo infusion, 2,400 mg/m2 = 5,665 mg, Intravenous, Over 46 hours, 14 of 14 cycles  Administration: 5,665 mg (5/6/2020), 5,665 mg (5/20/2020), 5,665 mg (6/3/2020), 5,665 mg (6/17/2020), 5,665 mg (7/29/2020), 5,665 mg (8/12/2020), 5,665 mg (8/26/2020), 5,665 mg (9/9/2020), 5,665 mg (9/23/2020), 5,665 mg (10/6/2020), 5,665 mg (10/21/2020), 5,665 mg (11/4/2020)  leucovorin calcium 900 mg in dextrose 5 % 250 mL infusion, 945 mg, Intravenous, [facility] 1 time, 14 of 14 cycles  Administration: 900 mg (5/6/2020), 900 mg (5/20/2020), 900 mg (6/3/2020), 900 mg (6/17/2020), 945 mg  (6/30/2020), 945 mg (7/20/2020), 945 mg (7/29/2020), 945 mg (8/12/2020), 945 mg (8/26/2020), 945 mg (9/9/2020), 945 mg (9/23/2020), 945 mg (10/6/2020), 945 mg (10/21/2020), 945 mg (11/4/2020)  oxaliplatin (ELOXATIN) 200 mg in dextrose 5 % 500 mL chemo infusion, 201 mg, Intravenous, Clinic/[facility] 1 time, 6 of 6 cycles  Dose modification: 65 mg/m2 (original dose 85 mg/m2, Cycle 6)  Administration: 200 mg (5/6/2020), 200 mg (5/20/2020), 200 mg (6/3/2020), 200 mg (6/17/2020), 201 mg (6/30/2020), 150 mg (7/20/2020)     6/16/2021 -  Chemotherapy    Treatment Summary   Plan Name: OP COLORECTAL FOLFIRI + BEVACIZUMAB Q2W  Treatment Goal: Control  Status: Active  Start Date: 6/16/2021  End Date: 9/4/2023 (Planned)  Provider: Marah Santo MD  Chemotherapy: fluorouraciL injection 990 mg, 400 mg/m2 = 990 mg, Intravenous, Clinic/[facility] 1 time, 15 of 15 cycles  Administration: 990 mg (6/16/2021), 990 mg (6/30/2021), 990 mg (7/14/2021), 980 mg (7/28/2021), 990 mg (8/11/2021), 990 mg (8/25/2021), 990 mg (9/8/2021), 990 mg (9/22/2021), 990 mg (10/6/2021), 990 mg (10/20/2021), 990 mg (11/3/2021), 990 mg (12/1/2021), 990 mg (12/15/2021), 990 mg (11/17/2021), 990 mg (12/28/2021)  fluorouraciL 2,400 mg/m2 = 5,950 mg in sodium chloride 0.9% 240 mL chemo infusion, 2,400 mg/m2 = 5,950 mg, Intravenous, Over 46 hours, 42 of 46 cycles  Administration: 5,950 mg (6/16/2021), 5,950 mg (6/30/2021), 5,950 mg (7/14/2021), 5,880 mg (7/28/2021), 5,930 mg (8/11/2021), 5,930 mg (8/25/2021), 5,930 mg (9/8/2021), 5,930 mg (9/22/2021), 5,930 mg (10/6/2021), 5,930 mg (10/20/2021), 5,930 mg (11/3/2021), 5,930 mg (12/1/2021), 5,930 mg (12/15/2021), 5,930 mg (11/17/2021), 5,930 mg (12/28/2021), 6,050 mg (5/11/2022), 6,025 mg (3/9/2022), 6,025 mg (2/23/2022), 5,930 mg (2/2/2022), 5,930 mg (1/19/2022), 6,050 mg (4/20/2022), 6,025 mg (4/6/2022), 6,025 mg (3/23/2022), 6,050 mg (6/1/2022), 6,050 mg (6/15/2022), 6,050 mg (10/19/2022), 6,050 mg (10/5/2022), 6,050 mg  (11/2/2022), 6,050 mg (11/16/2022), 6,050 mg (11/30/2022), 6,050 mg (1/4/2023), 6,050 mg (12/19/2022), 6,050 mg (1/18/2023), 6,000 mg (5/22/2023), 6,000 mg (4/26/2023), 6,000 mg (4/11/2023), 6,000 mg (2/28/2023), 6,050 mg (2/14/2023), 6,000 mg (2/1/2023), 6,000 mg (7/5/2023), 6,000 mg (6/19/2023), 6,000 mg (6/5/2023)  bevacizumab (AVASTIN) 600 mg in sodium chloride 0.9% 100 mL chemo infusion, 660 mg, Intravenous, [facility] 1 time, 36 of 36 cycles  Dose modification: 5 mg/kg (original dose 5 mg/kg, Cycle 26, Reason: MD Discretion, Comment: 5 mg/kg original dose)  Administration: 600 mg (6/30/2021), 600 mg (7/14/2021), 600 mg (7/28/2021), 600 mg (6/16/2021), 600 mg (8/11/2021), 655 mg (8/25/2021), 600 mg (9/8/2021), 600 mg (9/22/2021), 600 mg (10/6/2021), 600 mg (10/20/2021), 600 mg (11/3/2021), 655 mg (12/1/2021), 600 mg (12/15/2021), 600 mg (11/17/2021), 600 mg (12/28/2021), 600 mg (5/11/2022), 600 mg (3/9/2022), 600 mg (2/23/2022), 600 mg (2/2/2022), 600 mg (1/19/2022), 600 mg (4/20/2022), 680 mg (4/6/2022), 600 mg (3/23/2022), 680 mg (10/5/2022), 680 mg (10/19/2022), 680 mg (11/2/2022), 680 mg (11/16/2022), 680 mg (11/30/2022), 680 mg (1/4/2023), 680 mg (12/19/2022), 680 mg (1/18/2023), 680 mg (3/28/2023), 680 mg (3/14/2023), 680 mg (2/28/2023), 680 mg (2/14/2023), 680 mg (2/1/2023)  irinotecan (CAMPTOSAR) 440 mg in sodium chloride 0.9% 500 mL chemo infusion, 446 mg, Intravenous, [facility] 1 time, 44 of 48 cycles  Dose modification: 180 mg/m2 (original dose 180 mg/m2, Cycle 24)  Administration: 440 mg (6/16/2021), 440 mg (6/30/2021), 440 mg (7/14/2021), 440 mg (7/28/2021), 440 mg (8/11/2021), 444 mg (8/25/2021), 440 mg (9/8/2021), 440 mg (9/22/2021), 440 mg (10/6/2021), 440 mg (10/20/2021), 440 mg (11/3/2021), 440 mg (12/1/2021), 440 mg (12/15/2021), 440 mg (11/17/2021), 440 mg (12/28/2021), 440 mg (5/11/2022), 440 mg (3/9/2022), 440 mg (2/23/2022), 440 mg (2/2/2022), 440 mg (1/19/2022), 440 mg (4/20/2022), 440  mg (4/6/2022), 440 mg (3/24/2022), 440 mg (6/1/2022), 440 mg (6/15/2022), 440 mg (10/19/2022), 440 mg (10/5/2022), 440 mg (11/2/2022), 440 mg (11/16/2022), 440 mg (11/30/2022), 440 mg (1/4/2023), 440 mg (12/19/2022), 440 mg (1/18/2023), 440 mg (5/22/2023), 440 mg (4/26/2023), 440 mg (4/11/2023), 440 mg (3/28/2023), 440 mg (3/14/2023), 440 mg (2/28/2023), 440 mg (2/14/2023), 440 mg (2/1/2023), 440 mg (7/5/2023), 440 mg (6/19/2023), 440 mg (6/5/2023)  bevacizumab-awwb (MVASI) 5 mg/kg = 680 mg in sodium chloride 0.9% 100 mL infusion, 5 mg/kg = 680 mg (100 % of original dose 5 mg/kg), Intravenous, [facility] 1 time, 6 of 10 cycles  Dose modification: 5 mg/kg (original dose 5 mg/kg, Cycle 39)  Administration: 680 mg (4/11/2023), 680 mg (4/26/2023), 680 mg (5/22/2023), 680 mg (7/5/2023), 680 mg (6/19/2023), 680 mg (6/5/2023)     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   4/3/2020 Initial Diagnosis    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     5/6/2020 - 11/17/2020 Chemotherapy    Treatment Summary   Plan Name: OP FOLFOX 6 Q2W  Treatment Goal: Curative  Status: Inactive  Start Date: 5/6/2020  End Date: 11/6/2020  Provider: Dylan Leyva MD  Chemotherapy: fluorouraciL injection 945 mg, 400 mg/m2 = 945 mg, Intravenous, Clinic/[facility] 1 time, 14 of 14 cycles  Administration: 945 mg (5/6/2020), 945 mg (5/20/2020), 945 mg (6/3/2020), 945 mg (6/17/2020), 945 mg (7/29/2020), 945 mg (8/12/2020), 945 mg (8/26/2020), 945 mg (9/9/2020), 945 mg (9/23/2020), 945 mg (10/6/2020), 945 mg (10/21/2020), 945 mg (11/4/2020)  fluorouraciL 2,400 mg/m2 = 5,665 mg in sodium chloride 0.9% 240 mL chemo infusion, 2,400 mg/m2 = 5,665 mg, Intravenous, Over 46 hours, 14 of 14 cycles  Administration: 5,665 mg (5/6/2020), 5,665 mg (5/20/2020), 5,665 mg (6/3/2020), 5,665 mg (6/17/2020), 5,665 mg (7/29/2020), 5,665 mg (8/12/2020), 5,665 mg (8/26/2020), 5,665 mg (9/9/2020), 5,665 mg (9/23/2020), 5,665 mg (10/6/2020), 5,665 mg (10/21/2020), 5,665 mg  (11/4/2020)  leucovorin calcium 900 mg in dextrose 5 % 250 mL infusion, 945 mg, Intravenous, Clinic/[facility] 1 time, 14 of 14 cycles  Administration: 900 mg (5/6/2020), 900 mg (5/20/2020), 900 mg (6/3/2020), 900 mg (6/17/2020), 945 mg (6/30/2020), 945 mg (7/20/2020), 945 mg (7/29/2020), 945 mg (8/12/2020), 945 mg (8/26/2020), 945 mg (9/9/2020), 945 mg (9/23/2020), 945 mg (10/6/2020), 945 mg (10/21/2020), 945 mg (11/4/2020)  oxaliplatin (ELOXATIN) 200 mg in dextrose 5 % 500 mL chemo infusion, 201 mg, Intravenous, Clinic/[facility] 1 time, 6 of 6 cycles  Dose modification: 65 mg/m2 (original dose 85 mg/m2, Cycle 6)  Administration: 200 mg (5/6/2020), 200 mg (5/20/2020), 200 mg (6/3/2020), 200 mg (6/17/2020), 201 mg (6/30/2020), 150 mg (7/20/2020)     6/16/2021 -  Chemotherapy    Treatment Summary   Plan Name: OP COLORECTAL FOLFIRI + BEVACIZUMAB Q2W  Treatment Goal: Control  Status: Active  Start Date: 6/16/2021  End Date: 9/4/2023 (Planned)  Provider: Marah Santo MD  Chemotherapy: fluorouraciL injection 990 mg, 400 mg/m2 = 990 mg, Intravenous, Clinic/[facility] 1 time, 15 of 15 cycles  Administration: 990 mg (6/16/2021), 990 mg (6/30/2021), 990 mg (7/14/2021), 980 mg (7/28/2021), 990 mg (8/11/2021), 990 mg (8/25/2021), 990 mg (9/8/2021), 990 mg (9/22/2021), 990 mg (10/6/2021), 990 mg (10/20/2021), 990 mg (11/3/2021), 990 mg (12/1/2021), 990 mg (12/15/2021), 990 mg (11/17/2021), 990 mg (12/28/2021)  fluorouraciL 2,400 mg/m2 = 5,950 mg in sodium chloride 0.9% 240 mL chemo infusion, 2,400 mg/m2 = 5,950 mg, Intravenous, Over 46 hours, 42 of 46 cycles  Administration: 5,950 mg (6/16/2021), 5,950 mg (6/30/2021), 5,950 mg (7/14/2021), 5,880 mg (7/28/2021), 5,930 mg (8/11/2021), 5,930 mg (8/25/2021), 5,930 mg (9/8/2021), 5,930 mg (9/22/2021), 5,930 mg (10/6/2021), 5,930 mg (10/20/2021), 5,930 mg (11/3/2021), 5,930 mg (12/1/2021), 5,930 mg (12/15/2021), 5,930 mg (11/17/2021), 5,930 mg (12/28/2021), 6,050 mg (5/11/2022), 6,025 mg (3/9/2022),  6,025 mg (2/23/2022), 5,930 mg (2/2/2022), 5,930 mg (1/19/2022), 6,050 mg (4/20/2022), 6,025 mg (4/6/2022), 6,025 mg (3/23/2022), 6,050 mg (6/1/2022), 6,050 mg (6/15/2022), 6,050 mg (10/19/2022), 6,050 mg (10/5/2022), 6,050 mg (11/2/2022), 6,050 mg (11/16/2022), 6,050 mg (11/30/2022), 6,050 mg (1/4/2023), 6,050 mg (12/19/2022), 6,050 mg (1/18/2023), 6,000 mg (5/22/2023), 6,000 mg (4/26/2023), 6,000 mg (4/11/2023), 6,000 mg (2/28/2023), 6,050 mg (2/14/2023), 6,000 mg (2/1/2023), 6,000 mg (7/5/2023), 6,000 mg (6/19/2023), 6,000 mg (6/5/2023)  bevacizumab (AVASTIN) 600 mg in sodium chloride 0.9% 100 mL chemo infusion, 660 mg, Intravenous, [facility]\"" 1 time, 36 of 36 cycles  Dose modification: 5 mg/kg (original dose 5 mg/kg, Cycle 26, Reason: MD Discretion, Comment: 5 mg/kg original dose)  Administration: 600 mg (6/30/2021), 600 mg (7/14/2021), 600 mg (7/28/2021), 600 mg (6/16/2021), 600 mg (8/11/2021), 655 mg (8/25/2021), 600 mg (9/8/2021), 600 mg (9/22/2021), 600 mg (10/6/2021), 600 mg (10/20/2021), 600 mg (11/3/2021), 655 mg (12/1/2021), 600 mg (12/15/2021), 600 mg (11/17/2021), 600 mg (12/28/2021), 600 mg (5/11/2022), 600 mg (3/9/2022), 600 mg (2/23/2022), 600 mg (2/2/2022), 600 mg (1/19/2022), 600 mg (4/20/2022), 680 mg (4/6/2022), 600 mg (3/23/2022), 680 mg (10/5/2022), 680 mg (10/19/2022), 680 mg (11/2/2022), 680 mg (11/16/2022), 680 mg (11/30/2022), 680 mg (1/4/2023), 680 mg (12/19/2022), 680 mg (1/18/2023), 680 mg (3/28/2023), 680 mg (3/14/2023), 680 mg (2/28/2023), 680 mg (2/14/2023), 680 mg (2/1/2023)  irinotecan (CAMPTOSAR) 440 mg in sodium chloride 0.9% 500 mL chemo infusion, 446 mg, Intravenous, [facility]\"" 1 time, 44 of 48 cycles  Dose modification: 180 mg/m2 (original dose 180 mg/m2, Cycle 24)  Administration: 440 mg (6/16/2021), 440 mg (6/30/2021), 440 mg (7/14/2021), 440 mg (7/28/2021), 440 mg (8/11/2021), 444 mg (8/25/2021), 440 mg (9/8/2021), 440 mg (9/22/2021), 440 mg (10/6/2021), 440 mg  (10/20/2021), 440 mg (11/3/2021), 440 mg (2021), 440 mg (12/15/2021), 440 mg (2021), 440 mg (2021), 440 mg (2022), 440 mg (3/9/2022), 440 mg (2022), 440 mg (2022), 440 mg (2022), 440 mg (2022), 440 mg (2022), 440 mg (3/24/2022), 440 mg (2022), 440 mg (6/15/2022), 440 mg (10/19/2022), 440 mg (10/5/2022), 440 mg (2022), 440 mg (2022), 440 mg (2022), 440 mg (2023), 440 mg (2022), 440 mg (2023), 440 mg (2023), 440 mg (2023), 440 mg (2023), 440 mg (3/28/2023), 440 mg (3/14/2023), 440 mg (2023), 440 mg (2023), 440 mg (2023), 440 mg (2023), 440 mg (2023), 440 mg (2023)  bevacizumab-awwb (MVASI) 5 mg/kg = 680 mg in sodium chloride 0.9% 100 mL infusion, 5 mg/kg = 680 mg (100 % of original dose 5 mg/kg), Intravenous, Clinic[facility] 1 time, 6 of 10 cycles  Dose modification: 5 mg/kg (original dose 5 mg/kg, Cycle 39)  Administration: 680 mg (2023), 680 mg (2023), 680 mg (2023), 680 mg (2023), 680 mg (2023), 680 mg (2023)         History:  Past Medical History:   Diagnosis Date    Anxiety     Depression     FH: ovarian cancer 3/16/2020    Hx of psychiatric care     Effexor, Paxil, Lexapro, Zoloft, Wellbutrin, Trazodone Buspar    Hyperthyroidism     Hypothyroid     Kidney calculi     Malignant neoplasm of sigmoid colon 3/16/2020    Menorrhagia     Multinodular goiter 2012    Palpitation     Psychiatric problem     Venous insufficiency       Past Surgical History:   Procedure Laterality Date     SECTION, CLASSIC      x3    COLONOSCOPY N/A 2020    Procedure: COLONOSCOPY;  Surgeon: Shane Parker MD;  Location: [facility] ENDO;  Service: Endoscopy;  Laterality: N/A;    COLONOSCOPY N/A 2022    Procedure: COLONOSCOPY;  Surgeon: Shane Parker MD;  Location: [facility] ENDO;  Service: Endoscopy;  Laterality: N/A;    COLONOSCOPY N/A 2023    Procedure: COLONOSCOPY;   Surgeon: Shane Parker MD;  Location: [facility];  Service: Endoscopy;  Laterality: N/A;  no cecum anastomosis    CYSTOSCOPY W/ URETERAL STENT PLACEMENT Bilateral 3/25/2020    Procedure: CYSTOSCOPY, WITH URETERAL STENT INSERTION;  Surgeon: Claudio Tyson MD;  Location: [facility] OR [address] [place] [address];  Service: Urology;  Laterality: Bilateral;    INSERTION OF TUNNELED CENTRAL VENOUS CATHETER (CVC) WITH SUBCUTANEOUS PORT N/A 5/1/2020    Procedure: [id number]-CATH;  Surgeon: Dosc Diagnostic Provider;  [facility];  Service: Radiology;  Laterality: N/A;  Room 189/[facility]    LAPAROSCOPIC SIGMOIDECTOMY N/A 3/25/2020    Procedure: COLECTOMY, SIGMOID, LAPAROSCOPIC, flex sig, ERAS high;  Surgeon: Silvio Man MD;  Location: [address];  Service: Colon and Rectal;  Laterality: N/A;    MOBILIZATION OF SPLENIC FLEXURE  3/25/2020    Procedure: MOBILIZATION, SPLENIC FLEXURE;  Surgeon: Silvio Man MD;  [facility];  Service: Colon and Rectal;;    tonsillectomy      TOTAL ABDOMINAL HYSTERECTOMY W/ BILATERAL SALPINGOOPHORECTOMY N/A 3/25/2020    Procedure: HYSTERECTOMY, TOTAL, ABDOMINAL, WITH BILATERAL SALPINGO-OOPHORECTOMY;  Surgeon: Keron Brady MD;  Location: [facility] OR [address] [place] [address];  Service: Oncology;  Laterality: N/A;     Family History   Problem Relation Age of Onset    Heart disease Father     Diabetes Mother 65    Drug abuse Brother     Drug abuse Brother     Cancer Maternal Aunt         lung cancer    Cancer Maternal Grandmother         stomach cancer- started in ovaries    Ovarian cancer Maternal Grandmother         stomach cancer- started in ovaries    Colon cancer Paternal Uncle     Cancer Paternal Uncle     Ovarian cancer Maternal Aunt     Ovarian cancer Maternal Aunt     Ovarian cancer Cousin         mother had ovarian cancer    Drug abuse Son     Drug abuse Son         clean/sober since 2012    Colon cancer Son     No Known Problems Daughter     Uterine cancer Neg Hx      "Breast cancer Neg Hx      Social History     Tobacco Use    Smoking status: Never    Smokeless tobacco: Never   Substance and Sexual Activity    Alcohol use: Yes     Comment: occasionally- twice monthly    Drug use: Never    Sexual activity: Yes     Partners: Male     Birth control/protection: See Surgical Hx        ROS:   Review of Systems   Constitutional:  Positive for malaise/fatigue (first few days). Negative for fever and weight loss.   HENT:  Negative for congestion, hearing loss, nosebleeds and sore throat.    Eyes:  Negative for double vision and photophobia.   Respiratory:  Negative for cough, hemoptysis, sputum production, shortness of breath and wheezing.    Cardiovascular:  Negative for chest pain, palpitations, orthopnea and leg swelling.   Gastrointestinal:  Positive for constipation (intermittent) and nausea (improving). Negative for abdominal pain, blood in stool, diarrhea, heartburn and vomiting.   Genitourinary:  Negative for dysuria, frequency, hematuria and urgency.   Musculoskeletal:  Negative for back pain, joint pain and myalgias.   Skin:  Negative for itching and rash.   Neurological:  Negative for dizziness, tingling, seizures, weakness and headaches.   Endo/Heme/Allergies:  Negative for polydipsia. Does not bruise/bleed easily.   Psychiatric/Behavioral:  Negative for depression and memory loss. The patient is nervous/anxious. The patient does not have insomnia.         Objective:     Vitals:    07/31/23 0912   BP: 108/68   Pulse: 93   Resp: 16   Temp: 97 °F (36.1 °C)   TempSrc: Temporal   SpO2: 97%   Weight: 129.5 kg (285 lb 7.9 oz)   Height: 5' 6" (1.676 m)   PainSc: 0-No pain         Wt Readings from Last 10 Encounters:   07/31/23 129.5 kg (285 lb 7.9 oz)   07/07/23 127.8 kg (281 lb 12 oz)   07/05/23 127.8 kg (281 lb 12 oz)   06/29/23 127.8 kg (281 lb 12 oz)   06/21/23 128.9 kg (284 lb 2.8 oz)   06/19/23 128.9 kg (284 lb 2.8 oz)   06/19/23 128.9 kg (284 lb 2.8 oz)   06/07/23 129 kg (284 " lb 6.3 oz)   06/05/23 129 kg (284 lb 6.3 oz)   06/05/23 129 kg (284 lb 6.3 oz)       Physical Examination:   Physical Exam  Vitals and nursing note reviewed.   Constitutional:       General: She is not in acute distress.     Appearance: She is not diaphoretic.   HENT:      Head: Normocephalic.      Mouth/Throat:      Pharynx: No oropharyngeal exudate.   Eyes:      General: No scleral icterus.     Conjunctiva/sclera: Conjunctivae normal.   Neck:      Thyroid: No thyromegaly.   Cardiovascular:      Rate and Rhythm: Normal rate and regular rhythm.      Heart sounds: Normal heart sounds. No murmur heard.  Pulmonary:      Effort: Pulmonary effort is normal. No respiratory distress.      Breath sounds: No stridor. No wheezing or rales.   Chest:      Chest wall: No tenderness.   Abdominal:      General: Bowel sounds are normal. There is no distension.      Palpations: Abdomen is soft. There is no mass.      Tenderness: There is no abdominal tenderness. There is no rebound.   Musculoskeletal:         General: No tenderness or deformity. Normal range of motion.      Cervical back: Neck supple.   Lymphadenopathy:      Cervical: No cervical adenopathy.   Skin:     General: Skin is warm and dry.      Findings: No erythema or rash.   Neurological:      Mental Status: She is alert and oriented to person, place, and time.      Cranial Nerves: No cranial nerve deficit.      Coordination: Coordination normal.      Gait: Gait is intact.   Psychiatric:         Mood and Affect: Affect normal.         Cognition and Memory: Memory normal.         Judgment: Judgment normal.          Diagnostic Tests:  Significant Imaging: I have reviewed and interpreted all pertinent imaging results/findings.  Laboratory Data:  All pertinent labs have been reviewed.    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 Oxaliplatin was stopped due to severe adverse reaction.     Follow-up CTs and PET in May 2021 showed enlarging retroperitoneal node, concerning for metastatic disease.      She started FOLFIRI + Avastin and has continued till July 2022.   Given isolated RP toni disease, she has undergone open Left periaortic and aortocaval lymph node dissection on 7/12/2022.     Follow up scans showed disease progression. I have discussed the case with Dr. Montelongo at [facility]. We discussed resuming FOLFIRI-lloyd at this time and then enrolling to  SWOG 2107 (encorafenib/cetuximab +/- nivo) at the time of progression. Trial is now available at [facility].     Extensive hospital records reviewed, CT scans show stable disease.  Clinically ok to continue FOLFIRI and Avastin with close monitoring. Repeat scans in August, prior to next cycle.      Nausea   Continue compazine. Continue sacuso patch for the first week.     Constipation  Start sennakot and daily Miralax.     Transaminitis  Fluctuates.  Continue to monitor. No obvious liver mets.     Hypercalcemia   Mild, secondary to hyperparathyroidism.  Avoid calcium supplements.     Hypothyroidism  Multinodular goiter   Continue Levothyroxine.  Had a non diagnostic biopsy in 2012, usg findings have remained stable. Reviewed repeat thyroid US, stable, will follow up with Endocrine.     Essential HTN   Continue antihypertensives.      Anxiety   Continue to  follow up with Dr. Palm.      MDM includes  :    - Acute or chronic illness or injury that poses a threat to life or bodily function  - Independent review and explanation of 3+ results from unique tests  - Discussion of management and ordering 3+ unique tests  - Extensive discussion of treatment and management  - Prescription drug management  - Drug therapy requiring intensive monitoring for toxicity          ECOG SCORE               Discussion:   No follow-ups on file.    Plan was discussed with the patient at length, and she verbalized understanding. Gail was given an opportunity to ask questions that were answered to her satisfaction, and she was advised to call in the interval if any problems or questions arise.    Electronically signed by Marah Santo MD        Route Chart for Scheduling    Med Onc Chart Routing      Follow up with physician . 8/14 9/20 or 9/40 benign heme spot   Follow up with [facility]    Infusion scheduling note   8/14   Injection scheduling note    Labs CBC and CMP   Scheduling:  Preferred lab:  Lab interval:     Imaging CT chest abdomen pelvis   8/11   Pharmacy appointment    Other referrals              Treatment Plan Information   OP COLORECTAL FOLFIRI + BEVACIZUMAB Q2W   Marah Santo MD   Upcoming Treatment Dates - OP COLORECTAL FOLFIRI + BEVACIZUMAB Q2W    7/18/2023       Pre-Medications       LORazepam (ATIVAN) injection 1 mg       promethazine (PHENERGAN) 6.25 mg in dextrose 5 % (D5W) 100 mL IVPB       palonosetron 0.25mg/dexAMETHasone 20mg in NS IVPB       Chemotherapy       bevacizumab-awwb (MVASI) 5 mg/kg = 680 mg in sodium chloride 0.9% 100 mL infusion       irinotecan (CAMPTOSAR) 440 mg in sodium chloride 0.9% 522 mL chemo infusion       leucovorin calcium 400 mg/m2 = 1,010 mg in dextrose 5 % (D5W) 250 mL infusion       fluorouracil (ADRUCIL) 6,000 mg in sodium chloride 0.9% 240 mL chemo infusion       Supportive Care       atropine injection 0.4 mg  8/2/2023       Pre-Medications       LORazepam  (ATIVAN) injection 1 mg       promethazine (PHENERGAN) 6.25 mg in dextrose 5 % (D5W) 100 mL IVPB       palonosetron 0.25mg/dexAMETHasone 20mg in NS IVPB       Chemotherapy       bevacizumab-awwb (MVASI) 5 mg/kg = 680 mg in sodium chloride 0.9% 100 mL infusion       irinotecan (CAMPTOSAR) 440 mg in sodium chloride 0.9% 522 mL chemo infusion       leucovorin calcium 400 mg/m2 = 1,010 mg in dextrose 5 % (D5W) 250 mL infusion       fluorouracil (ADRUCIL) 6,000 mg in sodium chloride 0.9% 240 mL chemo infusion       Supportive Care       atropine injection 0.4 mg  8/17/2023       Pre-Medications       LORazepam (ATIVAN) injection 1 mg       promethazine (PHENERGAN) 6.25 mg in dextrose 5 % (D5W) 100 mL IVPB       palonosetron 0.25mg/dexAMETHasone 20mg in NS IVPB       Chemotherapy       bevacizumab-awwb (MVASI) 5 mg/kg = 680 mg in sodium chloride 0.9% 100 mL infusion       irinotecan (CAMPTOSAR) 440 mg in sodium chloride 0.9% 522 mL chemo infusion       leucovorin calcium 400 mg/m2 = 1,010 mg in dextrose 5 % (D5W) 250 mL infusion       fluorouracil (ADRUCIL) 6,000 mg in sodium chloride 0.9% 240 mL chemo infusion       Supportive Care       atropine injection 0.4 mg  9/1/2023       Pre-Medications       LORazepam (ATIVAN) injection 1 mg       promethazine (PHENERGAN) 6.25 mg in dextrose 5 % (D5W) 100 mL IVPB       palonosetron 0.25mg/dexAMETHasone 20mg in NS IVPB       Chemotherapy       bevacizumab-awwb (MVASI) 5 mg/kg = 680 mg in sodium chloride 0.9% 100 mL infusion       irinotecan (CAMPTOSAR) 440 mg in sodium chloride 0.9% 522 mL chemo infusion       leucovorin calcium 400 mg/m2 = 1,010 mg in dextrose 5 % (D5W) 250 mL infusion       fluorouracil (ADRUCIL) 6,000 mg in sodium chloride 0.9% 240 mL chemo infusion       Supportive Care       atropine injection 0.4 mg    Supportive Plan Information  IV FLUIDS AND ELECTROLYTES   Brayden Solo MD   Upcoming Treatment Dates - IV FLUIDS AND ELECTROLYTES    No upcoming days in  selected categories.    Therapy Plan Information  PORT FLUSH  Flushes  heparin, porcine (PF) 100 unit/mL injection flush 500 Units  500 Units, Intravenous, Every visit  sodium chloride 0.9% flush 10 mL  10 mL, Intravenous, Every visit

## 2023-08-02 ENCOUNTER — INFUSION (OUTPATIENT)
Dept: INFUSION THERAPY | Facility: HOSPITAL | Age: 53
End: 2023-08-02
Attending: INTERNAL MEDICINE
Payer: MEDICAID

## 2023-08-02 VITALS
RESPIRATION RATE: 16 BRPM | HEART RATE: 86 BPM | SYSTOLIC BLOOD PRESSURE: 132 MMHG | OXYGEN SATURATION: 96 % | DIASTOLIC BLOOD PRESSURE: 72 MMHG

## 2023-08-02 DIAGNOSIS — C77.2 METASTASIS TO INTESTINAL LYMPH NODE: Primary | ICD-10-CM

## 2023-08-02 DIAGNOSIS — C18.7 MALIGNANT NEOPLASM OF SIGMOID COLON: ICD-10-CM

## 2023-08-02 PROCEDURE — 96361 HYDRATE IV INFUSION ADD-ON: CPT | Mod: PN

## 2023-08-02 PROCEDURE — 96374 THER/PROPH/DIAG INJ IV PUSH: CPT | Mod: PN

## 2023-08-02 PROCEDURE — 96375 TX/PRO/DX INJ NEW DRUG ADDON: CPT | Mod: PN

## 2023-08-02 PROCEDURE — A4216 STERILE WATER/SALINE, 10 ML: HCPCS | Mod: PN | Performed by: INTERNAL MEDICINE

## 2023-08-02 PROCEDURE — 25000003 PHARM REV CODE 250: Mod: PN | Performed by: INTERNAL MEDICINE

## 2023-08-02 PROCEDURE — 63600175 PHARM REV CODE 636 W HCPCS: Mod: PN | Performed by: INTERNAL MEDICINE

## 2023-08-02 RX ORDER — SODIUM CHLORIDE 9 MG/ML
1000 INJECTION, SOLUTION INTRAVENOUS
Status: DISCONTINUED | OUTPATIENT
Start: 2023-08-02 | End: 2023-08-02 | Stop reason: HOSPADM

## 2023-08-02 RX ORDER — ONDANSETRON 2 MG/ML
8 INJECTION INTRAMUSCULAR; INTRAVENOUS
Status: COMPLETED | OUTPATIENT
Start: 2023-08-02 | End: 2023-08-02

## 2023-08-02 RX ORDER — DEXAMETHASONE SODIUM PHOSPHATE 4 MG/ML
8 INJECTION, SOLUTION INTRA-ARTICULAR; INTRALESIONAL; INTRAMUSCULAR; INTRAVENOUS; SOFT TISSUE
Status: COMPLETED | OUTPATIENT
Start: 2023-08-02 | End: 2023-08-02

## 2023-08-02 RX ORDER — SODIUM CHLORIDE 0.9 % (FLUSH) 0.9 %
10 SYRINGE (ML) INJECTION
Status: DISCONTINUED | OUTPATIENT
Start: 2023-08-02 | End: 2023-08-02 | Stop reason: HOSPADM

## 2023-08-02 RX ORDER — HEPARIN 100 UNIT/ML
500 SYRINGE INTRAVENOUS
Status: DISCONTINUED | OUTPATIENT
Start: 2023-08-02 | End: 2023-08-02 | Stop reason: HOSPADM

## 2023-08-02 RX ADMIN — Medication 10 ML: at 01:08

## 2023-08-02 RX ADMIN — DEXAMETHASONE SODIUM PHOSPHATE 8 MG: 4 INJECTION, SOLUTION INTRA-ARTICULAR; INTRALESIONAL; INTRAMUSCULAR; INTRAVENOUS; SOFT TISSUE at 11:08

## 2023-08-02 RX ADMIN — ONDANSETRON 8 MG: 2 INJECTION INTRAMUSCULAR; INTRAVENOUS at 11:08

## 2023-08-02 RX ADMIN — Medication 500 UNITS: at 01:08

## 2023-08-02 RX ADMIN — SODIUM CHLORIDE 1000 ML: 9 INJECTION, SOLUTION INTRAVENOUS at 10:08

## 2023-08-02 NOTE — PLAN OF CARE
Problem: Adult Inpatient Plan of Care  Goal: Plan of Care Review  Outcome: Ongoing, Progressing  Flowsheets (Taken 8/2/2023 1303)  Plan of Care Reviewed With: patient     Patient tolerated fluids and anti-emetics. She received just zofran and dex. She states that she feels better and will take either phenergan or compazine when she gets home. She appears much better than when she arrived for her appointment.   5FU pump was disconnected, port flushed with normal saline with blood return present. Deaccessed and covered with bandaid. Instructed patient to notify infusion center if she continues to have uncontrolled nausea. Patient acknowledged understanding.   AVS provided per portal. Ambulated per self upon discharge from infusion center. No acute distress noted.

## 2023-08-02 NOTE — PLAN OF CARE
Problem: Adult Inpatient Plan of Care  Goal: Patient-Specific Goal (Individualized)  Outcome: Ongoing, Progressing  Flowsheets (Taken 8/2/2023 1100)  Anxieties, Fears or Concerns:   very nauseated   hasn't taken any anti-emetics this morning  Individualized Care Needs: recliner, blanket, emesis bag, will send secure chat asking for antiemetics     Problem: Fatigue  Goal: Improved Activity Tolerance  Intervention: Promote Improved Energy  Flowsheets (Taken 8/2/2023 1100)  Fatigue Management: fatigue-related activity identified  Sleep/Rest Enhancement:   natural light exposure provided   noise level reduced  Activity Management:   Ambulated -L4   Ambulated in reyes - L4     Secure chat sent to charge nurses who also included Dr Santo. She ordered zofran 8mg, dexamethasone 8mg, and phenergan 12.5mg. Patient requested that we hold off on phenergan because she does have to drive. Will administer zofran and dex and see how she feels. Also will be receiving 1L normal saline.

## 2023-08-03 ENCOUNTER — OFFICE VISIT (OUTPATIENT)
Dept: PSYCHIATRY | Facility: CLINIC | Age: 53
End: 2023-08-03
Payer: MEDICAID

## 2023-08-03 DIAGNOSIS — C80.1 ANXIETY ASSOCIATED WITH CANCER DIAGNOSIS: ICD-10-CM

## 2023-08-03 DIAGNOSIS — C18.7 MALIGNANT NEOPLASM OF SIGMOID COLON: ICD-10-CM

## 2023-08-03 DIAGNOSIS — F41.1 ANXIETY ASSOCIATED WITH CANCER DIAGNOSIS: ICD-10-CM

## 2023-08-03 DIAGNOSIS — F33.1 MODERATE EPISODE OF RECURRENT MAJOR DEPRESSIVE DISORDER: Primary | ICD-10-CM

## 2023-08-03 PROCEDURE — 90834 PSYTX W PT 45 MINUTES: CPT | Mod: AH,HB,, | Performed by: PSYCHOLOGIST

## 2023-08-03 PROCEDURE — 4010F PR ACE/ARB THEARPY RXD/TAKEN: ICD-10-PCS | Mod: AH,HB,CPTII, | Performed by: PSYCHOLOGIST

## 2023-08-03 PROCEDURE — 90834 PR PSYCHOTHERAPY W/PATIENT, 45 MIN: ICD-10-PCS | Mod: AH,HB,, | Performed by: PSYCHOLOGIST

## 2023-08-03 PROCEDURE — 4010F ACE/ARB THERAPY RXD/TAKEN: CPT | Mod: AH,HB,CPTII, | Performed by: PSYCHOLOGIST

## 2023-08-03 NOTE — PROGRESS NOTES
PSYCHO-ONCOLOGY NOTE/ Individual Psychotherapy     Date: 8/3/2023   Site:  [facility]      Therapeutic Intervention: Met with patient.  Outpatient - Insight oriented psychotherapy 45 min - CPT code 57683 and Outpatient - Supportive psychotherapy 45 min - CPT Code 52565    This includes face to face time and non-face to face time preparing to see the patient, obtaining and/or reviewing separately obtained history, documenting clinical information in the electronic or other health record, independently interpreting results and communicating results to the patient/family/caregiver, or care coordinator.      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    Insomnia    Insomnia    Other constipation    Nausea and vomiting    Mucositis due to chemotherapy    Morbid obesity    Secondary adenocarcinoma of lymph node    Thyroid nodule    Hypercalcemia    Transaminitis    Hypophosphatemia    Functional diarrhea    Primary hypertension    Diarrhea of presumed infectious origin    Calculus of gallbladder without cholecystitis without obstruction    ACP (advance care planning)    Abdominal pain    Shortness of breath       Chief complaint/reason for encounter: adjustment to illness, depression and anxiety      Met with patient to evaluate psychosocial adaptation to diagnosis/treatment of metastatic colon cancer    Current Medications  Current Outpatient Medications   Medication    acetaminophen (TYLENOL) 500 MG tablet    buPROPion (WELLBUTRIN SR) 150 MG TBSR 12 hr tablet    FLUoxetine (PROZAC) 20 MG capsule    granisetron (SANCUSO) 3.1 mg/24 hour    Lactobacillus rhamnosus GG  (CULTURELLE) 10 billion cell capsule    levothyroxine (SYNTHROID) 200 MCG tablet    LIDOcaine-prilocaine (EMLA) cream    LORazepam (ATIVAN) 1 MG tablet    magic mouthwash diphen/antac/lidoc/nysta    multivitamin (THERAGRAN) per tablet    olmesartan (BENICAR) 20 MG tablet    ondansetron (ZOFRAN-ODT) 8 MG TbDL    oxyCODONE-acetaminophen (PERCOCET) 5-325 mg per tablet    prochlorperazine (COMPAZINE) 10 MG tablet    senna-docusate 8.6-50 mg (PERICOLACE) 8.6-50 mg per tablet    traZODone (DESYREL) 50 MG tablet    valACYclovir (VALTREX) 500 MG tablet     No current facility-administered medications for this visit.       ONCOLOGY HISTORY  Oncology History   Malignant neoplasm of sigmoid colon   3/16/2020 Initial Diagnosis    Malignant neoplasm of sigmoid colon     3/31/2020 Cancer Staged    Staging form: Colon and Rectum, AJCC 8th Edition  - Clinical stage from 3/31/2020: Stage IIIC (cT4b, cN2a, cM0)     5/6/2020 - 11/17/2020 Chemotherapy    Treatment Summary   Plan Name: OP FOLFOX 6 Q2W  Treatment Goal: Curative  Status: Inactive  Start Date: 5/6/2020  End Date: 11/6/2020  Provider: Dylan Leyva MD  Chemotherapy: fluorouraciL injection 945 mg, 400 mg/m2 = 945 mg, Intravenous, Clinic/[facility] 1 time, 14 of 14 cycles  Administration: 945 mg (5/6/2020), 945 mg (5/20/2020), 945 mg (6/3/2020), 945 mg (6/17/2020), 945 mg (7/29/2020), 945 mg (8/12/2020), 945 mg (8/26/2020), 945 mg (9/9/2020), 945 mg (9/23/2020), 945 mg (10/6/2020), 945 mg (10/21/2020), 945 mg (11/4/2020)  fluorouraciL 2,400 mg/m2 = 5,665 mg in sodium chloride 0.9% 240 mL chemo infusion, 2,400 mg/m2 = 5,665 mg, Intravenous, Over 46 hours, 14 of 14 cycles  Administration: 5,665 mg (5/6/2020), 5,665 mg (5/20/2020), 5,665 mg (6/3/2020), 5,665 mg (6/17/2020), 5,665 mg (7/29/2020), 5,665 mg (8/12/2020), 5,665 mg (8/26/2020), 5,665 mg (9/9/2020), 5,665 mg (9/23/2020), 5,665 mg (10/6/2020), 5,665 mg (10/21/2020), 5,665 mg (11/4/2020)  leucovorin calcium 900 mg in  dextrose 5 % 250 mL infusion, 945 mg, Intravenous, Clinic/[facility] 1 time, 14 of 14 cycles  Administration: 900 mg (5/6/2020), 900 mg (5/20/2020), 900 mg (6/3/2020), 900 mg (6/17/2020), 945 mg (6/30/2020), 945 mg (7/20/2020), 945 mg (7/29/2020), 945 mg (8/12/2020), 945 mg (8/26/2020), 945 mg (9/9/2020), 945 mg (9/23/2020), 945 mg (10/6/2020), 945 mg (10/21/2020), 945 mg (11/4/2020)  oxaliplatin (ELOXATIN) 200 mg in dextrose 5 % 500 mL chemo infusion, 201 mg, Intravenous, Clinic/[facility] 1 time, 6 of 6 cycles  Dose modification: 65 mg/m2 (original dose 85 mg/m2, Cycle 6)  Administration: 200 mg (5/6/2020), 200 mg (5/20/2020), 200 mg (6/3/2020), 200 mg (6/17/2020), 201 mg (6/30/2020), 150 mg (7/20/2020)     6/16/2021 -  Chemotherapy    Treatment Summary   Plan Name: OP COLORECTAL FOLFIRI + BEVACIZUMAB Q2W  Treatment Goal: Control  Status: Active  Start Date: 6/16/2021  End Date: 9/15/2023 (Planned)  Provider: Marah Santo MD  Chemotherapy: fluorouraciL injection 990 mg, 400 mg/m2 = 990 mg, Intravenous, Clinic/[facility] 1 time, 15 of 15 cycles  Administration: 990 mg (6/16/2021), 990 mg (6/30/2021), 990 mg (7/14/2021), 980 mg (7/28/2021), 990 mg (8/11/2021), 990 mg (8/25/2021), 990 mg (9/8/2021), 990 mg (9/22/2021), 990 mg (10/6/2021), 990 mg (10/20/2021), 990 mg (11/3/2021), 990 mg (12/1/2021), 990 mg (12/15/2021), 990 mg (11/17/2021), 990 mg (12/28/2021)  fluorouraciL 2,400 mg/m2 = 5,950 mg in sodium chloride 0.9% 240 mL chemo infusion, 2,400 mg/m2 = 5,950 mg, Intravenous, Over 46 hours, 43 of 46 cycles  Administration: 5,950 mg (6/16/2021), 5,950 mg (6/30/2021), 5,950 mg (7/14/2021), 5,880 mg (7/28/2021), 5,930 mg (8/11/2021), 5,930 mg (8/25/2021), 5,930 mg (9/8/2021), 5,930 mg (9/22/2021), 5,930 mg (10/6/2021), 5,930 mg (10/20/2021), 5,930 mg (11/3/2021), 5,930 mg (12/1/2021), 5,930 mg (12/15/2021), 5,930 mg (11/17/2021), 5,930 mg (12/28/2021), 6,050 mg (5/11/2022), 6,025 mg (3/9/2022), 6,025 mg (2/23/2022), 5,930 mg  (2/2/2022), 5,930 mg (1/19/2022), 6,050 mg (4/20/2022), 6,025 mg (4/6/2022), 6,025 mg (3/23/2022), 6,050 mg (6/1/2022), 6,050 mg (6/15/2022), 6,050 mg (10/19/2022), 6,050 mg (10/5/2022), 6,050 mg (11/2/2022), 6,050 mg (11/16/2022), 6,050 mg (11/30/2022), 6,050 mg (1/4/2023), 6,050 mg (12/19/2022), 6,050 mg (1/18/2023), 6,000 mg (5/22/2023), 6,000 mg (4/26/2023), 6,000 mg (4/11/2023), 6,000 mg (2/28/2023), 6,050 mg (2/14/2023), 6,000 mg (2/1/2023), 6,000 mg (7/5/2023), 6,000 mg (6/19/2023), 6,000 mg (6/5/2023), 6,000 mg (7/31/2023)  bevacizumab (AVASTIN) 600 mg in sodium chloride 0.9% 100 mL chemo infusion, 660 mg, Intravenous, [facility] 1 time, 36 of 36 cycles  Dose modification: 5 mg/kg (original dose 5 mg/kg, Cycle 26, Reason: MD Discretion, Comment: 5 mg/kg original dose)  Administration: 600 mg (6/30/2021), 600 mg (7/14/2021), 600 mg (7/28/2021), 600 mg (6/16/2021), 600 mg (8/11/2021), 655 mg (8/25/2021), 600 mg (9/8/2021), 600 mg (9/22/2021), 600 mg (10/6/2021), 600 mg (10/20/2021), 600 mg (11/3/2021), 655 mg (12/1/2021), 600 mg (12/15/2021), 600 mg (11/17/2021), 600 mg (12/28/2021), 600 mg (5/11/2022), 600 mg (3/9/2022), 600 mg (2/23/2022), 600 mg (2/2/2022), 600 mg (1/19/2022), 600 mg (4/20/2022), 680 mg (4/6/2022), 600 mg (3/23/2022), 680 mg (10/5/2022), 680 mg (10/19/2022), 680 mg (11/2/2022), 680 mg (11/16/2022), 680 mg (11/30/2022), 680 mg (1/4/2023), 680 mg (12/19/2022), 680 mg (1/18/2023), 680 mg (3/28/2023), 680 mg (3/14/2023), 680 mg (2/28/2023), 680 mg (2/14/2023), 680 mg (2/1/2023)  irinotecan (CAMPTOSAR) 440 mg in sodium chloride 0.9% 500 mL chemo infusion, 446 mg, Intravenous, [facility] 1 time, 45 of 48 cycles  Dose modification: 180 mg/m2 (original dose 180 mg/m2, Cycle 24)  Administration: 440 mg (6/16/2021), 440 mg (6/30/2021), 440 mg (7/14/2021), 440 mg (7/28/2021), 440 mg (8/11/2021), 444 mg (8/25/2021), 440 mg (9/8/2021), 440 mg (9/22/2021), 440 mg (10/6/2021), 440 mg (10/20/2021), 440 mg  (11/3/2021), 440 mg (12/1/2021), 440 mg (12/15/2021), 440 mg (11/17/2021), 440 mg (12/28/2021), 440 mg (5/11/2022), 440 mg (3/9/2022), 440 mg (2/23/2022), 440 mg (2/2/2022), 440 mg (1/19/2022), 440 mg (4/20/2022), 440 mg (4/6/2022), 440 mg (3/24/2022), 440 mg (6/1/2022), 440 mg (6/15/2022), 440 mg (10/19/2022), 440 mg (10/5/2022), 440 mg (11/2/2022), 440 mg (11/16/2022), 440 mg (11/30/2022), 440 mg (1/4/2023), 440 mg (12/19/2022), 440 mg (1/18/2023), 440 mg (5/22/2023), 440 mg (4/26/2023), 440 mg (4/11/2023), 440 mg (3/28/2023), 440 mg (3/14/2023), 440 mg (2/28/2023), 440 mg (2/14/2023), 440 mg (2/1/2023), 440 mg (7/5/2023), 440 mg (6/19/2023), 440 mg (6/5/2023), 440 mg (7/31/2023)  bevacizumab-awwb (MVASI) 5 mg/kg = 680 mg in sodium chloride 0.9% 100 mL infusion, 5 mg/kg = 680 mg (100 % of original dose 5 mg/kg), Intravenous, Clinic/[facility] 1 time, 7 of 10 cycles  Dose modification: 5 mg/kg (original dose 5 mg/kg, Cycle 39)  Administration: 680 mg (4/11/2023), 680 mg (4/26/2023), 680 mg (5/22/2023), 680 mg (7/5/2023), 680 mg (6/19/2023), 680 mg (6/5/2023), 680 mg (7/31/2023)     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   4/3/2020 Initial Diagnosis    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     5/6/2020 - 11/17/2020 Chemotherapy    Treatment Summary   Plan Name: OP FOLFOX 6 Q2W  Treatment Goal: Curative  Status: Inactive  Start Date: 5/6/2020  End Date: 11/6/2020  Provider: Dylan Leyva MD  Chemotherapy: fluorouraciL injection 945 mg, 400 mg/m2 = 945 mg, Intravenous, Clinic/[facility] 1 time, 14 of 14 cycles  Administration: 945 mg (5/6/2020), 945 mg (5/20/2020), 945 mg (6/3/2020), 945 mg (6/17/2020), 945 mg (7/29/2020), 945 mg (8/12/2020), 945 mg (8/26/2020), 945 mg (9/9/2020), 945 mg (9/23/2020), 945 mg (10/6/2020), 945 mg (10/21/2020), 945 mg (11/4/2020)  fluorouraciL 2,400 mg/m2 = 5,665 mg in sodium chloride 0.9% 240 mL chemo infusion, 2,400 mg/m2 = 5,665 mg, Intravenous, Over 46 hours, 14 of 14 cycles  Administration: 5,665 mg  (5/6/2020), 5,665 mg (5/20/2020), 5,665 mg (6/3/2020), 5,665 mg (6/17/2020), 5,665 mg (7/29/2020), 5,665 mg (8/12/2020), 5,665 mg (8/26/2020), 5,665 mg (9/9/2020), 5,665 mg (9/23/2020), 5,665 mg (10/6/2020), 5,665 mg (10/21/2020), 5,665 mg (11/4/2020)  leucovorin calcium 900 mg in dextrose 5 % 250 mL infusion, 945 mg, Intravenous, Clinic/[facility] 1 time, 14 of 14 cycles  Administration: 900 mg (5/6/2020), 900 mg (5/20/2020), 900 mg (6/3/2020), 900 mg (6/17/2020), 945 mg (6/30/2020), 945 mg (7/20/2020), 945 mg (7/29/2020), 945 mg (8/12/2020), 945 mg (8/26/2020), 945 mg (9/9/2020), 945 mg (9/23/2020), 945 mg (10/6/2020), 945 mg (10/21/2020), 945 mg (11/4/2020)  oxaliplatin (ELOXATIN) 200 mg in dextrose 5 % 500 mL chemo infusion, 201 mg, Intravenous, Clinic/[facility] 1 time, 6 of 6 cycles  Dose modification: 65 mg/m2 (original dose 85 mg/m2, Cycle 6)  Administration: 200 mg (5/6/2020), 200 mg (5/20/2020), 200 mg (6/3/2020), 200 mg (6/17/2020), 201 mg (6/30/2020), 150 mg (7/20/2020)     6/16/2021 -  Chemotherapy    Treatment Summary   Plan Name: OP COLORECTAL FOLFIRI + BEVACIZUMAB Q2W  Treatment Goal: Control  Status: Active  Start Date: 6/16/2021  End Date: 9/15/2023 (Planned)  Provider: Marah Santo MD  Chemotherapy: fluorouraciL injection 990 mg, 400 mg/m2 = 990 mg, Intravenous, Clinic/[facility] 1 time, 15 of 15 cycles  Administration: 990 mg (6/16/2021), 990 mg (6/30/2021), 990 mg (7/14/2021), 980 mg (7/28/2021), 990 mg (8/11/2021), 990 mg (8/25/2021), 990 mg (9/8/2021), 990 mg (9/22/2021), 990 mg (10/6/2021), 990 mg (10/20/2021), 990 mg (11/3/2021), 990 mg (12/1/2021), 990 mg (12/15/2021), 990 mg (11/17/2021), 990 mg (12/28/2021)  fluorouraciL 2,400 mg/m2 = 5,950 mg in sodium chloride 0.9% 240 mL chemo infusion, 2,400 mg/m2 = 5,950 mg, Intravenous, Over 46 hours, 43 of 46 cycles  Administration: 5,950 mg (6/16/2021), 5,950 mg (6/30/2021), 5,950 mg (7/14/2021), 5,880 mg (7/28/2021), 5,930 mg (8/11/2021), 5,930 mg (8/25/2021),  5,930 mg (9/8/2021), 5,930 mg (9/22/2021), 5,930 mg (10/6/2021), 5,930 mg (10/20/2021), 5,930 mg (11/3/2021), 5,930 mg (12/1/2021), 5,930 mg (12/15/2021), 5,930 mg (11/17/2021), 5,930 mg (12/28/2021), 6,050 mg (5/11/2022), 6,025 mg (3/9/2022), 6,025 mg (2/23/2022), 5,930 mg (2/2/2022), 5,930 mg (1/19/2022), 6,050 mg (4/20/2022), 6,025 mg (4/6/2022), 6,025 mg (3/23/2022), 6,050 mg (6/1/2022), 6,050 mg (6/15/2022), 6,050 mg (10/19/2022), 6,050 mg (10/5/2022), 6,050 mg (11/2/2022), 6,050 mg (11/16/2022), 6,050 mg (11/30/2022), 6,050 mg (1/4/2023), 6,050 mg (12/19/2022), 6,050 mg (1/18/2023), 6,000 mg (5/22/2023), 6,000 mg (4/26/2023), 6,000 mg (4/11/2023), 6,000 mg (2/28/2023), 6,050 mg (2/14/2023), 6,000 mg (2/1/2023), 6,000 mg (7/5/2023), 6,000 mg (6/19/2023), 6,000 mg (6/5/2023), 6,000 mg (7/31/2023)  bevacizumab (AVASTIN) 600 mg in sodium chloride 0.9% 100 mL chemo infusion, 660 mg, Intravenous, Clinic/[facility] 1 time, 36 of 36 cycles  Dose modification: 5 mg/kg (original dose 5 mg/kg, Cycle 26, Reason: MD Discretion, Comment: 5 mg/kg original dose)  Administration: 600 mg (6/30/2021), 600 mg (7/14/2021), 600 mg (7/28/2021), 600 mg (6/16/2021), 600 mg (8/11/2021), 655 mg (8/25/2021), 600 mg (9/8/2021), 600 mg (9/22/2021), 600 mg (10/6/2021), 600 mg (10/20/2021), 600 mg (11/3/2021), 655 mg (12/1/2021), 600 mg (12/15/2021), 600 mg (11/17/2021), 600 mg (12/28/2021), 600 mg (5/11/2022), 600 mg (3/9/2022), 600 mg (2/23/2022), 600 mg (2/2/2022), 600 mg (1/19/2022), 600 mg (4/20/2022), 680 mg (4/6/2022), 600 mg (3/23/2022), 680 mg (10/5/2022), 680 mg (10/19/2022), 680 mg (11/2/2022), 680 mg (11/16/2022), 680 mg (11/30/2022), 680 mg (1/4/2023), 680 mg (12/19/2022), 680 mg (1/18/2023), 680 mg (3/28/2023), 680 mg (3/14/2023), 680 mg (2/28/2023), 680 mg (2/14/2023), 680 mg (2/1/2023)  irinotecan (CAMPTOSAR) 440 mg in sodium chloride 0.9% 500 mL chemo infusion, 446 mg, Intravenous, Clinic/[facility] 1 time, 45 of 48 cycles  Dose  modification: 180 mg/m2 (original dose 180 mg/m2, Cycle 24)  Administration: 440 mg (6/16/2021), 440 mg (6/30/2021), 440 mg (7/14/2021), 440 mg (7/28/2021), 440 mg (8/11/2021), 444 mg (8/25/2021), 440 mg (9/8/2021), 440 mg (9/22/2021), 440 mg (10/6/2021), 440 mg (10/20/2021), 440 mg (11/3/2021), 440 mg (12/1/2021), 440 mg (12/15/2021), 440 mg (11/17/2021), 440 mg (12/28/2021), 440 mg (5/11/2022), 440 mg (3/9/2022), 440 mg (2/23/2022), 440 mg (2/2/2022), 440 mg (1/19/2022), 440 mg (4/20/2022), 440 mg (4/6/2022), 440 mg (3/24/2022), 440 mg (6/1/2022), 440 mg (6/15/2022), 440 mg (10/19/2022), 440 mg (10/5/2022), 440 mg (11/2/2022), 440 mg (11/16/2022), 440 mg (11/30/2022), 440 mg (1/4/2023), 440 mg (12/19/2022), 440 mg (1/18/2023), 440 mg (5/22/2023), 440 mg (4/26/2023), 440 mg (4/11/2023), 440 mg (3/28/2023), 440 mg (3/14/2023), 440 mg (2/28/2023), 440 mg (2/14/2023), 440 mg (2/1/2023), 440 mg (7/5/2023), 440 mg (6/19/2023), 440 mg (6/5/2023), 440 mg (7/31/2023)  bevacizumab-awwb (MVASI) 5 mg/kg = 680 mg in sodium chloride 0.9% 100 mL infusion, 5 mg/kg = 680 mg (100 % of original dose 5 mg/kg), Intravenous, Clinic/[facility] 1 time, 7 of 10 cycles  Dose modification: 5 mg/kg (original dose 5 mg/kg, Cycle 39)  Administration: 680 mg (4/11/2023), 680 mg (4/26/2023), 680 mg (5/22/2023), 680 mg (7/5/2023), 680 mg (6/19/2023), 680 mg (6/5/2023), 680 mg (7/31/2023)         Objective:  Gail Mckinnon arrived promptly for the session. Ms. Mckinnon was independently ambulatory at the time of session. The patient was fully cooperative throughout the session.  Appearance: age appropriate, casually  dressed, well groomed  Behavior/Cooperation: friendly and cooperative  Speech: appropriate quality, quantity and organization of sentences and quiet  Mood: sad  Affect: mood congruent and appropriate  Thought Process: goal-directed, logical  Thought Content: normal,  No delusions or paranoia; did not appear to be responding to internal  stimuli during the session  Orientation: grossly intact  Memory: grossly intact  Attention Span/Concentration: Attends to session without distraction; reports no difficulty  Fund of Knowledge: average  Estimate of Intelligence: average from verbal skills and history  Cognition: grossly intact  Insight: patient has awareness of illness; good insight into own behavior and behavior of others  Judgment: the patient's behavior is adequate to circumstances    [facility]:   DISTRESS SCREENING 7/31/2023 6/29/2023 6/19/2023 6/18/2023 6/5/2023 5/19/2023 5/15/2023   Distress Score 6 0 - No Distress 5 7 0 - No Distress 5 0 - No Distress   Practical Problems Treatment Decisions None of these None of these Insurance/Financial None of these Insurance/Financial None of these   Family Problems Dealing with Children None of these Dealing with Children Dealing with Children;Family Health Issues None of these Dealing with Children;Family Health Issues None of these   Emotional Problems Loss of Interest;Depression;Fears;Nervousness;Sadness;Worry None of these Loss of Interest;Depression Depression;Sadness;Worry;Loss of Interest None of these Depression;Sadness;Worry None of these   Spiritual / Gnosticist Concerns No No No No No No No   Physical Problems Fatigue;Constipation None of these Constipation;Pain Fatigue;Memory/Concentration;Mouth Sores;Pain;Tingling in hands or feet None of these Constipation;Fatigue;Feeling Swollen;Mouth Sores;Nausea;Skin Dry/Itchy;Tingling in hands or feet None of these   Other Problems - - - - - - -   Some encounter information is confidential and restricted. Go to Review Flowsheets activity to see all data.        Interval history and content of current session: Discussed adaptation to illness and multiple familial stressors. Reports to be coping with moderate difficulty, however, described enhanced acceptance of ability to function w/in locus of control. Evaluated cognitive response, paying  particular attention to negative intrusive thoughts of a persistent and detrimental nature. Thoughts of this type are in evidence with moderate distress. Provided cognitive behavioral therapy to address negative cognitions. Provided additional psychotherapeutic support as indicated. Identified and evaluated psychosocial and environmental stressors secondary to diagnosis and treatment.  Examined proactive behaviors that may be implemented to minimize or ameliorate psychosocial stressors secondary to chronic illness and treatment.     Risk parameters:   Patient reports no suicidal ideation  Patient reports no homicidal ideation  Patient reports no self-injurious behavior  Patient reports no violent behavior   Safety needs:  None at this time      Verbal deficits: None     Patient's response to intervention:The patient's response to intervention is accepting, motivated.     Progress toward goals and other mental status changes:  The patient's progress toward goals is good.      Progress to date:Progress as Expected and Progress - Ongoing, but Slow      Goals from last visit: Attempted, partially met        Patient Strengths: verbal, intelligent, successful, good social support, good insight, commitment to wellness, strong miah, strong cultural traditions      Treatment Plan:individual psychotherapy and medication management by psychiatry NP  Target symptoms: depression, anxiety   Why chosen therapy is appropriate versus another modality: relevant to diagnosis, patient responds to this modality, evidence based practice  Outcome monitoring methods: self-report, observation, checklist/rating scale  Therapeutic intervention type: insight oriented psychotherapy, behavior modifying psychotherapy, supportive psychotherapy  Prognosis: Fair                            Behavioral goals:               Social engagement:continued, interpersonal boundaries              Therapy: CBT, medication adherence, psychotherapeutic  support    Return to clinic: Pt referred to [facility] Psychiatry fellows upon departure of this provider     Length of Service (minutes direct face-to-face contact): 45    Diagnosis:     ICD-10-CM ICD-9-CM   1. Moderate episode of recurrent major depressive disorder  F33.1 296.32   2. Malignant neoplasm of sigmoid colon  C18.7 153.3   3. Anxiety associated with cancer diagnosis  F41.1 300.09    C80.1                 Marky Palm Psy.D.  [place] License #[id number]  [place] License #[id number]

## 2023-08-07 DIAGNOSIS — R11.0 NAUSEA: ICD-10-CM

## 2023-08-07 DIAGNOSIS — R30.0 DYSURIA: Primary | ICD-10-CM

## 2023-08-07 DIAGNOSIS — C18.7 MALIGNANT NEOPLASM OF SIGMOID COLON: ICD-10-CM

## 2023-08-07 RX ORDER — LORAZEPAM 1 MG/1
1 TABLET ORAL EVERY 12 HOURS PRN
Qty: 30 TABLET | Refills: 0 | Status: SHIPPED | OUTPATIENT
Start: 2023-08-07 | End: 2023-09-04 | Stop reason: SDUPTHER

## 2023-08-08 ENCOUNTER — LAB VISIT (OUTPATIENT)
Dept: LAB | Facility: HOSPITAL | Age: 53
End: 2023-08-08
Attending: INTERNAL MEDICINE
Payer: MEDICAID

## 2023-08-08 DIAGNOSIS — R30.0 DYSURIA: ICD-10-CM

## 2023-08-08 PROCEDURE — 87086 URINE CULTURE/COLONY COUNT: CPT | Performed by: INTERNAL MEDICINE

## 2023-08-09 LAB — BACTERIA UR CULT: NO GROWTH

## 2023-08-09 RX ORDER — FLUOXETINE HYDROCHLORIDE 20 MG/1
20 CAPSULE ORAL DAILY
Qty: 30 CAPSULE | Refills: 0 | Status: SHIPPED | OUTPATIENT
Start: 2023-08-09 | End: 2023-10-19

## 2023-08-11 ENCOUNTER — HOSPITAL ENCOUNTER (OUTPATIENT)
Dept: RADIOLOGY | Facility: HOSPITAL | Age: 53
Discharge: HOME | End: 2023-08-11
Attending: INTERNAL MEDICINE
Payer: MEDICAID

## 2023-08-11 DIAGNOSIS — C18.7 MALIGNANT NEOPLASM OF SIGMOID COLON: ICD-10-CM

## 2023-08-11 PROCEDURE — 71260 CT THORAX DX C+: CPT | Mod: TC,PO

## 2023-08-11 PROCEDURE — A9698 NON-RAD CONTRAST MATERIALNOC: HCPCS | Mod: PO | Performed by: INTERNAL MEDICINE

## 2023-08-11 PROCEDURE — 74177 CT CHEST ABDOMEN PELVIS WITH CONTRAST (XPD): ICD-10-PCS | Mod: 26,,, | Performed by: RADIOLOGY

## 2023-08-11 PROCEDURE — 74177 CT ABD & PELVIS W/CONTRAST: CPT | Mod: 26,,, | Performed by: RADIOLOGY

## 2023-08-11 PROCEDURE — 25500020 PHARM REV CODE 255: Mod: PO | Performed by: INTERNAL MEDICINE

## 2023-08-11 PROCEDURE — 71260 CT THORAX DX C+: CPT | Mod: 26,,, | Performed by: RADIOLOGY

## 2023-08-11 PROCEDURE — 71260 CT CHEST ABDOMEN PELVIS WITH CONTRAST (XPD): ICD-10-PCS | Mod: 26,,, | Performed by: RADIOLOGY

## 2023-08-11 PROCEDURE — 74177 CT ABD & PELVIS W/CONTRAST: CPT | Mod: TC,PO

## 2023-08-11 RX ADMIN — IOHEXOL 100 ML: 350 INJECTION, SOLUTION INTRAVENOUS at 10:08

## 2023-08-11 RX ADMIN — IOHEXOL 1000 ML: 12 SOLUTION ORAL at 10:08

## 2023-08-14 ENCOUNTER — RESEARCH ENCOUNTER (OUTPATIENT)
Dept: RESEARCH | Facility: HOSPITAL | Age: 53
End: 2023-08-14
Payer: MEDICAID

## 2023-08-14 ENCOUNTER — LAB VISIT (OUTPATIENT)
Dept: LAB | Facility: HOSPITAL | Age: 53
End: 2023-08-14
Attending: INTERNAL MEDICINE
Payer: MEDICAID

## 2023-08-14 ENCOUNTER — OFFICE VISIT (OUTPATIENT)
Dept: HEMATOLOGY/ONCOLOGY | Facility: CLINIC | Age: 53
End: 2023-08-14
Payer: MEDICAID

## 2023-08-14 ENCOUNTER — PATIENT MESSAGE (OUTPATIENT)
Dept: HEMATOLOGY/ONCOLOGY | Facility: CLINIC | Age: 53
End: 2023-08-14
Payer: MEDICAID

## 2023-08-14 VITALS
WEIGHT: 287.25 LBS | RESPIRATION RATE: 16 BRPM | HEIGHT: 66 IN | SYSTOLIC BLOOD PRESSURE: 117 MMHG | BODY MASS INDEX: 46.16 KG/M2 | OXYGEN SATURATION: 95 % | DIASTOLIC BLOOD PRESSURE: 81 MMHG | TEMPERATURE: 97 F | HEART RATE: 77 BPM

## 2023-08-14 DIAGNOSIS — C18.7 MALIGNANT NEOPLASM OF SIGMOID COLON: ICD-10-CM

## 2023-08-14 DIAGNOSIS — C77.9 SECONDARY ADENOCARCINOMA OF LYMPH NODE: ICD-10-CM

## 2023-08-14 DIAGNOSIS — C18.7 MALIGNANT NEOPLASM OF SIGMOID COLON: Primary | ICD-10-CM

## 2023-08-14 DIAGNOSIS — R11.0 NAUSEA: ICD-10-CM

## 2023-08-14 DIAGNOSIS — E83.52 HYPERCALCEMIA: ICD-10-CM

## 2023-08-14 DIAGNOSIS — N39.0 URINARY TRACT INFECTION WITHOUT HEMATURIA, SITE UNSPECIFIED: ICD-10-CM

## 2023-08-14 DIAGNOSIS — C77.2 METASTASIS TO INTESTINAL LYMPH NODE: ICD-10-CM

## 2023-08-14 LAB
ALBUMIN SERPL BCP-MCNC: 3.5 G/DL (ref 3.5–5.2)
ALP SERPL-CCNC: 118 U/L (ref 55–135)
ALT SERPL W/O P-5'-P-CCNC: 28 U/L (ref 10–44)
ANION GAP SERPL CALC-SCNC: 8 MMOL/L (ref 8–16)
AST SERPL-CCNC: 22 U/L (ref 10–40)
BASOPHILS # BLD AUTO: 0.03 K/UL (ref 0–0.2)
BASOPHILS NFR BLD: 0.6 % (ref 0–1.9)
BILIRUB SERPL-MCNC: 0.4 MG/DL (ref 0.1–1)
BUN SERPL-MCNC: 14 MG/DL (ref 6–20)
CALCIUM SERPL-MCNC: 10.6 MG/DL (ref 8.7–10.5)
CHLORIDE SERPL-SCNC: 111 MMOL/L (ref 95–110)
CO2 SERPL-SCNC: 21 MMOL/L (ref 23–29)
CREAT SERPL-MCNC: 0.9 MG/DL (ref 0.5–1.4)
DIFFERENTIAL METHOD: ABNORMAL
EOSINOPHIL # BLD AUTO: 0.4 K/UL (ref 0–0.5)
EOSINOPHIL NFR BLD: 7.4 % (ref 0–8)
ERYTHROCYTE [DISTWIDTH] IN BLOOD BY AUTOMATED COUNT: 15.7 % (ref 11.5–14.5)
EST. GFR  (NO RACE VARIABLE): >60 ML/MIN/1.73 M^2
GLUCOSE SERPL-MCNC: 111 MG/DL (ref 70–110)
HCT VFR BLD AUTO: 44.4 % (ref 37–48.5)
HGB BLD-MCNC: 14 G/DL (ref 12–16)
IMM GRANULOCYTES # BLD AUTO: 0.01 K/UL (ref 0–0.04)
IMM GRANULOCYTES NFR BLD AUTO: 0.2 % (ref 0–0.5)
LYMPHOCYTES # BLD AUTO: 1.1 K/UL (ref 1–4.8)
LYMPHOCYTES NFR BLD: 21.5 % (ref 18–48)
MCH RBC QN AUTO: 30.2 PG (ref 27–31)
MCHC RBC AUTO-ENTMCNC: 31.5 G/DL (ref 32–36)
MCV RBC AUTO: 96 FL (ref 82–98)
MONOCYTES # BLD AUTO: 0.5 K/UL (ref 0.3–1)
MONOCYTES NFR BLD: 10.3 % (ref 4–15)
NEUTROPHILS # BLD AUTO: 3.2 K/UL (ref 1.8–7.7)
NEUTROPHILS NFR BLD: 60 % (ref 38–73)
NRBC BLD-RTO: 0 /100 WBC
PLATELET # BLD AUTO: 214 K/UL (ref 150–450)
PMV BLD AUTO: 10.3 FL (ref 9.2–12.9)
POTASSIUM SERPL-SCNC: 4.3 MMOL/L (ref 3.5–5.1)
PROT SERPL-MCNC: 6.8 G/DL (ref 6–8.4)
RBC # BLD AUTO: 4.63 M/UL (ref 4–5.4)
SODIUM SERPL-SCNC: 140 MMOL/L (ref 136–145)
WBC # BLD AUTO: 5.25 K/UL (ref 3.9–12.7)

## 2023-08-14 PROCEDURE — 80053 COMPREHEN METABOLIC PANEL: CPT | Mod: PN | Performed by: INTERNAL MEDICINE

## 2023-08-14 PROCEDURE — 36415 COLL VENOUS BLD VENIPUNCTURE: CPT | Mod: PN | Performed by: INTERNAL MEDICINE

## 2023-08-14 PROCEDURE — 4010F PR ACE/ARB THEARPY RXD/TAKEN: ICD-10-PCS | Mod: CPTII,,, | Performed by: INTERNAL MEDICINE

## 2023-08-14 PROCEDURE — 3008F BODY MASS INDEX DOCD: CPT | Mod: CPTII,,, | Performed by: INTERNAL MEDICINE

## 2023-08-14 PROCEDURE — 3079F DIAST BP 80-89 MM HG: CPT | Mod: CPTII,,, | Performed by: INTERNAL MEDICINE

## 2023-08-14 PROCEDURE — 85025 COMPLETE CBC W/AUTO DIFF WBC: CPT | Mod: PN | Performed by: INTERNAL MEDICINE

## 2023-08-14 PROCEDURE — 3079F PR MOST RECENT DIASTOLIC BLOOD PRESSURE 80-89 MM HG: ICD-10-PCS | Mod: CPTII,,, | Performed by: INTERNAL MEDICINE

## 2023-08-14 PROCEDURE — 3008F PR BODY MASS INDEX (BMI) DOCUMENTED: ICD-10-PCS | Mod: CPTII,,, | Performed by: INTERNAL MEDICINE

## 2023-08-14 PROCEDURE — 4010F ACE/ARB THERAPY RXD/TAKEN: CPT | Mod: CPTII,,, | Performed by: INTERNAL MEDICINE

## 2023-08-14 PROCEDURE — 99215 OFFICE O/P EST HI 40 MIN: CPT | Mod: S$PBB,,, | Performed by: INTERNAL MEDICINE

## 2023-08-14 PROCEDURE — 99215 PR OFFICE/OUTPT VISIT, EST, LEVL V, 40-54 MIN: ICD-10-PCS | Mod: S$PBB,,, | Performed by: INTERNAL MEDICINE

## 2023-08-14 PROCEDURE — 3074F SYST BP LT 130 MM HG: CPT | Mod: CPTII,,, | Performed by: INTERNAL MEDICINE

## 2023-08-14 PROCEDURE — 99999 PR PBB SHADOW E&M-EST. PATIENT-LVL V: ICD-10-PCS | Mod: PBBFAC,,, | Performed by: INTERNAL MEDICINE

## 2023-08-14 PROCEDURE — 1159F PR MEDICATION LIST DOCUMENTED IN MEDICAL RECORD: ICD-10-PCS | Mod: CPTII,,, | Performed by: INTERNAL MEDICINE

## 2023-08-14 PROCEDURE — 99215 OFFICE O/P EST HI 40 MIN: CPT | Mod: PBBFAC,PN | Performed by: INTERNAL MEDICINE

## 2023-08-14 PROCEDURE — 3074F PR MOST RECENT SYSTOLIC BLOOD PRESSURE < 130 MM HG: ICD-10-PCS | Mod: CPTII,,, | Performed by: INTERNAL MEDICINE

## 2023-08-14 PROCEDURE — 99999 PR PBB SHADOW E&M-EST. PATIENT-LVL V: CPT | Mod: PBBFAC,,, | Performed by: INTERNAL MEDICINE

## 2023-08-14 PROCEDURE — 1159F MED LIST DOCD IN RCRD: CPT | Mod: CPTII,,, | Performed by: INTERNAL MEDICINE

## 2023-08-14 RX ORDER — OXYCODONE AND ACETAMINOPHEN 10; 325 MG/1; MG/1
1 TABLET ORAL EVERY 6 HOURS PRN
Qty: 60 TABLET | Refills: 0 | Status: SHIPPED | OUTPATIENT
Start: 2023-08-14 | End: 2023-12-15 | Stop reason: SDUPTHER

## 2023-08-14 NOTE — PROGRESS NOTES
Protocol: SWOG   IRB#: 2023.047  : NGUYEN Degroot MD  Treating Investigator: Dr. Marah Santo         Randomized Phase II Trial of Encorafinib and Cetuximab with or Without Nivolumab [place] [id number] for Patients with Previously Treated, Microsatellite Stable BRAF V600E Metastatic and/or Unresectable Colorectal Cancer        Informed Consent Note: [id number]    Patient presented to clinic today accompanied by her mother for planned office visit with Dr. Santo who discussed the above-referenced clinical trial to the patient as an option.   After expressing interest in the study, this CRC reviewed the following information with Mrs. Mckinnon and her mother.   The patient was alert, oriented,  with appropriate mood and affect for the situation.      Dr. Santo and this CRC discussed the following aspects of the consent in detail:  -- why the study is being conducted  -- why we are asking her for her participation  -- what all the medications being used in this study are  -- the patient's treatment options should she choose not to participate in the study  -- the side effects of all medications being used in the study   -- the potential risks and potential benefits of participating  -- the purpose of the trial, evaluating the efficacy of Cetuximab + Encorafenib with or without Nivolumab.  -- the study schema & the possible treatment arms she may be randomly assigned to (on a 1:1 basis)  -- screening procedures, the study procedures   -- the chemotherapy treatment schedule (Weeks 1 and 3 of a 4-week cycle)  -- patient's responsibilities & the costs of participation (what tests, procedures & medications will be billed to patient's insurance, and what is being covered by the study)  -- the contact information for [facility] Galvez Mizrahi, the [facility] Institutional Review Board and their roles in clinical trials & where to find more information on the study  -- patient's rights as a human research  participant, and that participation in the study is completely voluntary. That she can withdraw his consent at any time without penalty. That        Dr. Santo can also withdraw her consent if she no longer thinks the study is medically beneficial.    -- that we keep all information we share with study personnel for 10 years after the study closes. That she may access this information at any point during these 10 years.   -- who is authorized to view patient's PHI (study personnel, [employer] & their representatives, [facility] IRB, the [facility]), our confidentiality clauses, and HIPAA explanation  -- the patient is a woman of non-childbearing potential, so contraception was not discussed      During and after the review of the informed consent, the patient and her mother were given the opportunity to ask questions. All patient's questions were answered by Dr. Santo and this CRC to her satisfaction. Patient queried & denies any additional questions or concerns at this time. Patient & CRC signed the consent & research HIPAA authorization. CRC provided a copy of the signed consents to patient along with my card.      The patient was instructed that this CRC would complete eligibility criteria as the next step in the process, and there is the possibility that additional testing may be required. The patient verbalized understanding.    Patient was encouraged to call or email CRC or call Dr. Santo's team with any questions or concerns. Patient verbalized her understanding & denies any questions or concerns at this time.   The patient and her mother ambulated out of the clinic area.     CHANDRIKA Adams RN

## 2023-08-14 NOTE — PROGRESS NOTES
PROGRESS NOTE    Subjective:       Patient ID: Gail Mckinnon is a 53 y.o. female.  MRN: [id number]  : 1970    Chief Complaint: Metastatic colon cancer     History of Present Illness:   Gail Mckinnon is a 53 y.o. female who presents with colon cancer, initially stage III and now with LN recurrence.    On FOLIRI+ Avastin sine .     She was briefly switched to xeloda due to 5 FU shortage for a month. Did not tolerate Xeloda well due hand foot syndrome, blistering of feet, did not take the last day of Xeloda. Completed .     Resumed Folfiri + Avastin on 23. Atropine was held due to prior constipation.     Was admitted to the hospital from -23 for intractable nausea , vomiting and diarrhea as well as abdominal pain. No hematochezia or dark stool was noted.      US showed gallbladder stone and dilated CBD. She was managed conservatively. Had CT abd, pelvis, colonoscopy and MRCP that were relatively unremarkable without signs of cholecystitis. Cholecystectomy was not recommended.     She had recently resumed FOLFIRI and the symptoms started after the first cycle of resuming, never had issues prior to this.  Stayed in the hospital for 10 days.  C diff was negative. No other etiology was established. Symptoms improved over time and she was discharged on .     Interim history:  Here to obtain review recent scans and further recommendations.    Still has left flank pain, has been using Ibuprofen and percocet without significant relief. Scan show disease progression.     Oncology History:  She completed adjuvant FOLFOX in 2020. She tolerated only 4 cycles of FOLFOX before she developed an infusion reaction to oxaliplatin in cycle 5 was well as cycle 6. She then completed 6 cycles of infusional 5 FU.     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 She was offered second line therapy with  FOLFIRI and Avastin.      She presented to the ED on 11/26 with left flank pain. CT renal stone study showed Moderate hydronephrosis on the left secondary to 3 mm calculus at the UPJ.    She had a PET scan mid April that showed possible progression of her disease with      She has been to [facility] for another opinion. No change was recommended in systemic therapy but she was offered surgical removal of the aorta caval nodes.  Recent sans at [facility]  show stable disease.      Surgery done 7/12/22. Received 2 more cycle of FOLFIRI without Avastin.     She had repeat imaging at [facility] on 9/24/22 that unfortunately showed disease progression since her surgery with multiple RP nodes and one mediastinal node.       PET 12/8/22  Impression:     1. Progression of disease with interval increase of abdominal and pelvic lymphadenopathy.  2. New area of moderate FDG uptake at the right half of the T9 vertebral body (image 124).  Favor degenerative disc changes.  No underlying osteolytic or osteoblastic lesion.  Recommend continued attention on follow-up.  3. No other evidence of FDG avid disease.     12/22/22  Impression After scan comparison:     1. Metastatic portacaval and left periaortic retroperitoneal lymph nodes which remain stable between the CT of 09/24/2022 and the subsequent PET-CT of 12/08/2022.  There is no evidence of progression of metastatic disease.  2. Nonobstructing bilateral renal calculi and cholelithiasis.    2/10/22:  CT chest abd pelvis  Impression:     Stable periportal, retroperitoneal and mesenteric lymphadenopathy compared to PET-CT 12/08/2022.     Stable right middle lobe 3 mm pulmonary nodule.  No new or enlarging pulmonary nodule.     Hepatic steatosis.  Hepatosplenomegaly.     Cholelithiasis.     Bilateral nonobstructing nephrolithiasis.     Small hiatal hernia with retained contrast in the distal esophagus.  Correlate for reflux.    She had virtual visit at [facility] on 2/17/23.     She has continued FOLFIRI and  Avastin.     CT abd pelvis   5/7/23  Impression:     1. Mesenteric inflammatory changes have worsened since the prior study.  2. Mesenteric, retroperitoneal and left-sided pelvic enlarged lymph nodes are unchanged.     8/11/23  CT chest abd pelvis   Impression:     1. Patient with a history of colon adenocarcinoma with worsening periaortic, retroperitoneal, mesenteric, and iliac chain lymphadenopathy the in the abdomen and pelvis when compared with the previous study suggesting worsening metastatic disease.  2. Moderate left hydronephrosis and proximal left hydroureter to the level of possible retroperitoneal scarring.  Invasion/compression of the left ureter is not excluded.  3. Bilateral nephrolithiasis  4. Hepatomegaly and hepatic steatosis  5. Two tiny < 5 mm pulmonary nodules within the chest, unchanged.  No new pulmonary nodules.    Oncology History:  Oncology History   Malignant neoplasm of sigmoid colon   3/16/2020 Initial Diagnosis    Malignant neoplasm of sigmoid colon     3/31/2020 Cancer Staged    Staging form: Colon and Rectum, AJCC 8th Edition  - Clinical stage from 3/31/2020: Stage IIIC (cT4b, cN2a, cM0)     5/6/2020 - 11/17/2020 Chemotherapy    Treatment Summary   Plan Name: OP FOLFOX 6 Q2W  Treatment Goal: Curative  Status: Inactive  Start Date: 5/6/2020  End Date: 11/6/2020  Provider: Dylan Leyva MD  Chemotherapy: fluorouraciL injection 945 mg, 400 mg/m2 = 945 mg, Intravenous, Clinic/[facility] 1 time, 14 of 14 cycles  Administration: 945 mg (5/6/2020), 945 mg (5/20/2020), 945 mg (6/3/2020), 945 mg (6/17/2020), 945 mg (7/29/2020), 945 mg (8/12/2020), 945 mg (8/26/2020), 945 mg (9/9/2020), 945 mg (9/23/2020), 945 mg (10/6/2020), 945 mg (10/21/2020), 945 mg (11/4/2020)  fluorouraciL 2,400 mg/m2 = 5,665 mg in sodium chloride 0.9% 240 mL chemo infusion, 2,400 mg/m2 = 5,665 mg, Intravenous, Over 46 hours, 14 of 14 cycles  Administration: 5,665 mg (5/6/2020), 5,665 mg (5/20/2020), 5,665 mg (6/3/2020),  5,665 mg (6/17/2020), 5,665 mg (7/29/2020), 5,665 mg (8/12/2020), 5,665 mg (8/26/2020), 5,665 mg (9/9/2020), 5,665 mg (9/23/2020), 5,665 mg (10/6/2020), 5,665 mg (10/21/2020), 5,665 mg (11/4/2020)  leucovorin calcium 900 mg in dextrose 5 % 250 mL infusion, 945 mg, Intravenous, Clinic/[facility] 1 time, 14 of 14 cycles  Administration: 900 mg (5/6/2020), 900 mg (5/20/2020), 900 mg (6/3/2020), 900 mg (6/17/2020), 945 mg (6/30/2020), 945 mg (7/20/2020), 945 mg (7/29/2020), 945 mg (8/12/2020), 945 mg (8/26/2020), 945 mg (9/9/2020), 945 mg (9/23/2020), 945 mg (10/6/2020), 945 mg (10/21/2020), 945 mg (11/4/2020)  oxaliplatin (ELOXATIN) 200 mg in dextrose 5 % 500 mL chemo infusion, 201 mg, Intravenous, Clinic/[facility] 1 time, 6 of 6 cycles  Dose modification: 65 mg/m2 (original dose 85 mg/m2, Cycle 6)  Administration: 200 mg (5/6/2020), 200 mg (5/20/2020), 200 mg (6/3/2020), 200 mg (6/17/2020), 201 mg (6/30/2020), 150 mg (7/20/2020)     6/16/2021 -  Chemotherapy    Treatment Summary   Plan Name: OP COLORECTAL FOLFIRI + BEVACIZUMAB Q2W  Treatment Goal: Control  Status: Active  Start Date: 6/16/2021  End Date: 9/15/2023 (Planned)  Provider: Marah Santo MD  Chemotherapy: fluorouraciL injection 990 mg, 400 mg/m2 = 990 mg, Intravenous, Clinic/[facility] 1 time, 15 of 15 cycles  Administration: 990 mg (6/16/2021), 990 mg (6/30/2021), 990 mg (7/14/2021), 980 mg (7/28/2021), 990 mg (8/11/2021), 990 mg (8/25/2021), 990 mg (9/8/2021), 990 mg (9/22/2021), 990 mg (10/6/2021), 990 mg (10/20/2021), 990 mg (11/3/2021), 990 mg (12/1/2021), 990 mg (12/15/2021), 990 mg (11/17/2021), 990 mg (12/28/2021)  fluorouraciL 2,400 mg/m2 = 5,950 mg in sodium chloride 0.9% 240 mL chemo infusion, 2,400 mg/m2 = 5,950 mg, Intravenous, Over 46 hours, 43 of 46 cycles  Administration: 5,950 mg (6/16/2021), 5,950 mg (6/30/2021), 5,950 mg (7/14/2021), 5,880 mg (7/28/2021), 5,930 mg (8/11/2021), 5,930 mg (8/25/2021), 5,930 mg (9/8/2021), 5,930 mg (9/22/2021), 5,930 mg  (10/6/2021), 5,930 mg (10/20/2021), 5,930 mg (11/3/2021), 5,930 mg (12/1/2021), 5,930 mg (12/15/2021), 5,930 mg (11/17/2021), 5,930 mg (12/28/2021), 6,050 mg (5/11/2022), 6,025 mg (3/9/2022), 6,025 mg (2/23/2022), 5,930 mg (2/2/2022), 5,930 mg (1/19/2022), 6,050 mg (4/20/2022), 6,025 mg (4/6/2022), 6,025 mg (3/23/2022), 6,050 mg (6/1/2022), 6,050 mg (6/15/2022), 6,050 mg (10/19/2022), 6,050 mg (10/5/2022), 6,050 mg (11/2/2022), 6,050 mg (11/16/2022), 6,050 mg (11/30/2022), 6,050 mg (1/4/2023), 6,050 mg (12/19/2022), 6,050 mg (1/18/2023), 6,000 mg (5/22/2023), 6,000 mg (4/26/2023), 6,000 mg (4/11/2023), 6,000 mg (2/28/2023), 6,050 mg (2/14/2023), 6,000 mg (2/1/2023), 6,000 mg (7/5/2023), 6,000 mg (6/19/2023), 6,000 mg (6/5/2023), 6,000 mg (7/31/2023)  bevacizumab (AVASTIN) 600 mg in sodium chloride 0.9% 100 mL chemo infusion, 660 mg, Intravenous, [facility] time, 36 of 36 cycles  Dose modification: 5 mg/kg (original dose 5 mg/kg, Cycle 26, Reason: MD Discretion, Comment: 5 mg/kg original dose)  Administration: 600 mg (6/30/2021), 600 mg (7/14/2021), 600 mg (7/28/2021), 600 mg (6/16/2021), 600 mg (8/11/2021), 655 mg (8/25/2021), 600 mg (9/8/2021), 600 mg (9/22/2021), 600 mg (10/6/2021), 600 mg (10/20/2021), 600 mg (11/3/2021), 655 mg (12/1/2021), 600 mg (12/15/2021), 600 mg (11/17/2021), 600 mg (12/28/2021), 600 mg (5/11/2022), 600 mg (3/9/2022), 600 mg (2/23/2022), 600 mg (2/2/2022), 600 mg (1/19/2022), 600 mg (4/20/2022), 680 mg (4/6/2022), 600 mg (3/23/2022), 680 mg (10/5/2022), 680 mg (10/19/2022), 680 mg (11/2/2022), 680 mg (11/16/2022), 680 mg (11/30/2022), 680 mg (1/4/2023), 680 mg (12/19/2022), 680 mg (1/18/2023), 680 mg (3/28/2023), 680 mg (3/14/2023), 680 mg (2/28/2023), 680 mg (2/14/2023), 680 mg (2/1/2023)  irinotecan (CAMPTOSAR) 440 mg in sodium chloride 0.9% 500 mL chemo infusion, 446 mg, Intravenous, [facility] [place] 1 time, 45 of 48 cycles  Dose modification: 180 mg/m2 (original dose 180 mg/m2, Cycle  24)  Administration: 440 mg (6/16/2021), 440 mg (6/30/2021), 440 mg (7/14/2021), 440 mg (7/28/2021), 440 mg (8/11/2021), 444 mg (8/25/2021), 440 mg (9/8/2021), 440 mg (9/22/2021), 440 mg (10/6/2021), 440 mg (10/20/2021), 440 mg (11/3/2021), 440 mg (12/1/2021), 440 mg (12/15/2021), 440 mg (11/17/2021), 440 mg (12/28/2021), 440 mg (5/11/2022), 440 mg (3/9/2022), 440 mg (2/23/2022), 440 mg (2/2/2022), 440 mg (1/19/2022), 440 mg (4/20/2022), 440 mg (4/6/2022), 440 mg (3/24/2022), 440 mg (6/1/2022), 440 mg (6/15/2022), 440 mg (10/19/2022), 440 mg (10/5/2022), 440 mg (11/2/2022), 440 mg (11/16/2022), 440 mg (11/30/2022), 440 mg (1/4/2023), 440 mg (12/19/2022), 440 mg (1/18/2023), 440 mg (5/22/2023), 440 mg (4/26/2023), 440 mg (4/11/2023), 440 mg (3/28/2023), 440 mg (3/14/2023), 440 mg (2/28/2023), 440 mg (2/14/2023), 440 mg (2/1/2023), 440 mg (7/5/2023), 440 mg (6/19/2023), 440 mg (6/5/2023), 440 mg (7/31/2023)  bevacizumab-awwb (MVASI) 5 mg/kg = 680 mg in sodium chloride 0.9% 100 mL infusion, 5 mg/kg = 680 mg (100 % of original dose 5 mg/kg), Intravenous, Clinic/[facility] 1 time, 7 of 10 cycles  Dose modification: 5 mg/kg (original dose 5 mg/kg, Cycle 39)  Administration: 680 mg (4/11/2023), 680 mg (4/26/2023), 680 mg (5/22/2023), 680 mg (7/5/2023), 680 mg (6/19/2023), 680 mg (6/5/2023), 680 mg (7/31/2023)     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   4/3/2020 Initial Diagnosis    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     5/6/2020 - 11/17/2020 Chemotherapy    Treatment Summary   Plan Name: OP FOLFOX 6 Q2W  Treatment Goal: Curative  Status: Inactive  Start Date: 5/6/2020  End Date: 11/6/2020  Provider: Dylan Leyva MD  Chemotherapy: fluorouraciL injection 945 mg, 400 mg/m2 = 945 mg, Intravenous, Clinic/[facility] 1 time, 14 of 14 cycles  Administration: 945 mg (5/6/2020), 945 mg (5/20/2020), 945 mg (6/3/2020), 945 mg (6/17/2020), 945 mg (7/29/2020), 945 mg (8/12/2020), 945 mg (8/26/2020), 945 mg (9/9/2020), 945 mg (9/23/2020), 945 mg  (10/6/2020), 945 mg (10/21/2020), 945 mg (11/4/2020)  fluorouraciL 2,400 mg/m2 = 5,665 mg in sodium chloride 0.9% 240 mL chemo infusion, 2,400 mg/m2 = 5,665 mg, Intravenous, Over 46 hours, 14 of 14 cycles  Administration: 5,665 mg (5/6/2020), 5,665 mg (5/20/2020), 5,665 mg (6/3/2020), 5,665 mg (6/17/2020), 5,665 mg (7/29/2020), 5,665 mg (8/12/2020), 5,665 mg (8/26/2020), 5,665 mg (9/9/2020), 5,665 mg (9/23/2020), 5,665 mg (10/6/2020), 5,665 mg (10/21/2020), 5,665 mg (11/4/2020)  leucovorin calcium 900 mg in dextrose 5 % 250 mL infusion, 945 mg, Intravenous, Clinic/[facility] 1 time, 14 of 14 cycles  Administration: 900 mg (5/6/2020), 900 mg (5/20/2020), 900 mg (6/3/2020), 900 mg (6/17/2020), 945 mg (6/30/2020), 945 mg (7/20/2020), 945 mg (7/29/2020), 945 mg (8/12/2020), 945 mg (8/26/2020), 945 mg (9/9/2020), 945 mg (9/23/2020), 945 mg (10/6/2020), 945 mg (10/21/2020), 945 mg (11/4/2020)  oxaliplatin (ELOXATIN) 200 mg in dextrose 5 % 500 mL chemo infusion, 201 mg, Intravenous, Clinic/[facility] 1 time, 6 of 6 cycles  Dose modification: 65 mg/m2 (original dose 85 mg/m2, Cycle 6)  Administration: 200 mg (5/6/2020), 200 mg (5/20/2020), 200 mg (6/3/2020), 200 mg (6/17/2020), 201 mg (6/30/2020), 150 mg (7/20/2020)     6/16/2021 -  Chemotherapy    Treatment Summary   Plan Name: OP COLORECTAL FOLFIRI + BEVACIZUMAB Q2W  Treatment Goal: Control  Status: Active  Start Date: 6/16/2021  End Date: 9/15/2023 (Planned)  Provider: Marah Santo MD  Chemotherapy: fluorouraciL injection 990 mg, 400 mg/m2 = 990 mg, Intravenous, Clinic/[facility] 1 time, 15 of 15 cycles  Administration: 990 mg (6/16/2021), 990 mg (6/30/2021), 990 mg (7/14/2021), 980 mg (7/28/2021), 990 mg (8/11/2021), 990 mg (8/25/2021), 990 mg (9/8/2021), 990 mg (9/22/2021), 990 mg (10/6/2021), 990 mg (10/20/2021), 990 mg (11/3/2021), 990 mg (12/1/2021), 990 mg (12/15/2021), 990 mg (11/17/2021), 990 mg (12/28/2021)  fluorouraciL 2,400 mg/m2 = 5,950 mg in sodium chloride 0.9% 240 mL  chemo infusion, 2,400 mg/m2 = 5,950 mg, Intravenous, Over 46 hours, 43 of 46 cycles  Administration: 5,950 mg (6/16/2021), 5,950 mg (6/30/2021), 5,950 mg (7/14/2021), 5,880 mg (7/28/2021), 5,930 mg (8/11/2021), 5,930 mg (8/25/2021), 5,930 mg (9/8/2021), 5,930 mg (9/22/2021), 5,930 mg (10/6/2021), 5,930 mg (10/20/2021), 5,930 mg (11/3/2021), 5,930 mg (12/1/2021), 5,930 mg (12/15/2021), 5,930 mg (11/17/2021), 5,930 mg (12/28/2021), 6,050 mg (5/11/2022), 6,025 mg (3/9/2022), 6,025 mg (2/23/2022), 5,930 mg (2/2/2022), 5,930 mg (1/19/2022), 6,050 mg (4/20/2022), 6,025 mg (4/6/2022), 6,025 mg (3/23/2022), 6,050 mg (6/1/2022), 6,050 mg (6/15/2022), 6,050 mg (10/19/2022), 6,050 mg (10/5/2022), 6,050 mg (11/2/2022), 6,050 mg (11/16/2022), 6,050 mg (11/30/2022), 6,050 mg (1/4/2023), 6,050 mg (12/19/2022), 6,050 mg (1/18/2023), 6,000 mg (5/22/2023), 6,000 mg (4/26/2023), 6,000 mg (4/11/2023), 6,000 mg (2/28/2023), 6,050 mg (2/14/2023), 6,000 mg (2/1/2023), 6,000 mg (7/5/2023), 6,000 mg (6/19/2023), 6,000 mg (6/5/2023), 6,000 mg (7/31/2023)  bevacizumab (AVASTIN) 600 mg in sodium chloride 0.9% 100 mL chemo infusion, 660 mg, Intravenous, [facility] 1 time, 36 of 36 cycles  Dose modification: 5 mg/kg (original dose 5 mg/kg, Cycle 26, Reason: MD Discretion, Comment: 5 mg/kg original dose)  Administration: 600 mg (6/30/2021), 600 mg (7/14/2021), 600 mg (7/28/2021), 600 mg (6/16/2021), 600 mg (8/11/2021), 655 mg (8/25/2021), 600 mg (9/8/2021), 600 mg (9/22/2021), 600 mg (10/6/2021), 600 mg (10/20/2021), 600 mg (11/3/2021), 655 mg (12/1/2021), 600 mg (12/15/2021), 600 mg (11/17/2021), 600 mg (12/28/2021), 600 mg (5/11/2022), 600 mg (3/9/2022), 600 mg (2/23/2022), 600 mg (2/2/2022), 600 mg (1/19/2022), 600 mg (4/20/2022), 680 mg (4/6/2022), 600 mg (3/23/2022), 680 mg (10/5/2022), 680 mg (10/19/2022), 680 mg (11/2/2022), 680 mg (11/16/2022), 680 mg (11/30/2022), 680 mg (1/4/2023), 680 mg (12/19/2022), 680 mg (1/18/2023), 680 mg  (3/28/2023), 680 mg (3/14/2023), 680 mg (2/28/2023), 680 mg (2/14/2023), 680 mg (2/1/2023)  irinotecan (CAMPTOSAR) 440 mg in sodium chloride 0.9% 500 mL chemo infusion, 446 mg, Intravenous, [facility] 1 time, 45 of 48 cycles  Dose modification: 180 mg/m2 (original dose 180 mg/m2, Cycle 24)  Administration: 440 mg (6/16/2021), 440 mg (6/30/2021), 440 mg (7/14/2021), 440 mg (7/28/2021), 440 mg (8/11/2021), 444 mg (8/25/2021), 440 mg (9/8/2021), 440 mg (9/22/2021), 440 mg (10/6/2021), 440 mg (10/20/2021), 440 mg (11/3/2021), 440 mg (12/1/2021), 440 mg (12/15/2021), 440 mg (11/17/2021), 440 mg (12/28/2021), 440 mg (5/11/2022), 440 mg (3/9/2022), 440 mg (2/23/2022), 440 mg (2/2/2022), 440 mg (1/19/2022), 440 mg (4/20/2022), 440 mg (4/6/2022), 440 mg (3/24/2022), 440 mg (6/1/2022), 440 mg (6/15/2022), 440 mg (10/19/2022), 440 mg (10/5/2022), 440 mg (11/2/2022), 440 mg (11/16/2022), 440 mg (11/30/2022), 440 mg (1/4/2023), 440 mg (12/19/2022), 440 mg (1/18/2023), 440 mg (5/22/2023), 440 mg (4/26/2023), 440 mg (4/11/2023), 440 mg (3/28/2023), 440 mg (3/14/2023), 440 mg (2/28/2023), 440 mg (2/14/2023), 440 mg (2/1/2023), 440 mg (7/5/2023), 440 mg (6/19/2023), 440 mg (6/5/2023), 440 mg (7/31/2023)  bevacizumab-awwb (MVASI) 5 mg/kg = 680 mg in sodium chloride 0.9% 100 mL infusion, 5 mg/kg = 680 mg (100 % of original dose 5 mg/kg), Intravenous, Clinic/[facility] 1 time, 7 of 10 cycles  Dose modification: 5 mg/kg (original dose 5 mg/kg, Cycle 39)  Administration: 680 mg (4/11/2023), 680 mg (4/26/2023), 680 mg (5/22/2023), 680 mg (7/5/2023), 680 mg (6/19/2023), 680 mg (6/5/2023), 680 mg (7/31/2023)         History:  Past Medical History:   Diagnosis Date    Anxiety     Depression     FH: ovarian cancer 3/16/2020    Hx of psychiatric care     Effexor, Paxil, Lexapro, Zoloft, Wellbutrin, Trazodone Buspar    Hyperthyroidism     Hypothyroid     Kidney calculi     Malignant neoplasm of sigmoid colon 3/16/2020    Menorrhagia      Multinodular goiter 2012    Palpitation     Psychiatric problem     Venous insufficiency       Past Surgical History:   Procedure Laterality Date     SECTION, CLASSIC      x3    COLONOSCOPY N/A 2020    Procedure: COLONOSCOPY;  Surgeon: Shane Parker MD;  Location: [facility];  Service: Endoscopy;  Laterality: N/A;    COLONOSCOPY N/A 2022    Procedure: COLONOSCOPY;  Surgeon: Shane Parker MD;  Location: [facility] ENDO;  Service: Endoscopy;  Laterality: N/A;    COLONOSCOPY N/A 2023    Procedure: COLONOSCOPY;  Surgeon: Shane Parker MD;  Location: [facility];  Service: Endoscopy;  Laterality: N/A;  no cecum anastomosis    CYSTOSCOPY W/ URETERAL STENT PLACEMENT Bilateral 3/25/2020    Procedure: CYSTOSCOPY, WITH URETERAL STENT INSERTION;  Surgeon: Claudio Tyson MD;  Location: [facility] OR [address] [place] [address];  Service: Urology;  Laterality: Bilateral;    INSERTION OF TUNNELED CENTRAL VENOUS CATHETER (CVC) WITH SUBCUTANEOUS PORT N/A 2020    Procedure: [id number];  [facility];  [facility];  Service: Radiology;  Laterality: N/A;  Room 189/[facility]    LAPAROSCOPIC SIGMOIDECTOMY N/A 3/25/2020    Procedure: COLECTOMY, SIGMOID, LAPAROSCOPIC, flex sig, ERAS high;  Surgeon: Silvio Man MD;  [facility];  Service: Colon and Rectal;  Laterality: N/A;    MOBILIZATION OF SPLENIC FLEXURE  3/25/2020    Procedure: MOBILIZATION, SPLENIC FLEXURE;  Surgeon: Silvio Man MD;  [facility];  Service: Colon and Rectal;;    tonsillectomy      TOTAL ABDOMINAL HYSTERECTOMY W/ BILATERAL SALPINGOOPHORECTOMY N/A 3/25/2020    Procedure: HYSTERECTOMY, TOTAL, ABDOMINAL, WITH BILATERAL SALPINGO-OOPHORECTOMY;  Surgeon: Keron Brady MD;  [facility];  Service: Oncology;  Laterality: N/A;     Family History   Problem Relation Age of Onset    Heart disease Father     Diabetes Mother 65    Drug abuse  Brother     Drug abuse Brother     Cancer Maternal Aunt         lung cancer    Cancer Maternal Grandmother         stomach cancer- started in ovaries    Ovarian cancer Maternal Grandmother         stomach cancer- started in ovaries    Colon cancer Paternal Uncle     Cancer Paternal Uncle     Ovarian cancer Maternal Aunt     Ovarian cancer Maternal Aunt     Ovarian cancer Cousin         mother had ovarian cancer    Drug abuse Son     Drug abuse Son         clean/sober since 2012    Colon cancer Son     No Known Problems Daughter     Uterine cancer Neg Hx     Breast cancer Neg Hx      Social History     Tobacco Use    Smoking status: Never    Smokeless tobacco: Never   Substance and Sexual Activity    Alcohol use: Yes     Comment: occasionally- twice monthly    Drug use: Never    Sexual activity: Yes     Partners: Male     Birth control/protection: See Surgical Hx        ROS:   Review of Systems   Constitutional:  Positive for malaise/fatigue (first few days). Negative for fever and weight loss.   HENT:  Negative for congestion, hearing loss, nosebleeds and sore throat.    Eyes:  Negative for double vision and photophobia.   Respiratory:  Negative for cough, hemoptysis, sputum production, shortness of breath and wheezing.    Cardiovascular:  Negative for chest pain, palpitations, orthopnea and leg swelling.   Gastrointestinal:  Positive for constipation (intermittent) and nausea (improving). Negative for abdominal pain, blood in stool, diarrhea, heartburn and vomiting.   Genitourinary:  Negative for dysuria, frequency, hematuria and urgency.   Musculoskeletal:  Negative for back pain, joint pain and myalgias.   Skin:  Negative for itching and rash.   Neurological:  Negative for dizziness, tingling, seizures, weakness and headaches.   Endo/Heme/Allergies:  Negative for polydipsia. Does not bruise/bleed easily.   Psychiatric/Behavioral:  Negative for depression and memory loss. The patient is  "nervous/anxious. The patient does not have insomnia.         Objective:     Vitals:    08/14/23 0948   BP: 117/81   Pulse: 77   Resp: 16   Temp: 96.7 °F (35.9 °C)   TempSrc: Temporal   SpO2: 95%   Weight: 130.3 kg (287 lb 4.2 oz)   Height: 5' 6" (1.676 m)   PainSc:   5       Wt Readings from Last 10 Encounters:   08/14/23 130.3 kg (287 lb 4.2 oz)   07/31/23 129.5 kg (285 lb 7.9 oz)   07/31/23 129.5 kg (285 lb 7.9 oz)   07/07/23 127.8 kg (281 lb 12 oz)   07/05/23 127.8 kg (281 lb 12 oz)   06/29/23 127.8 kg (281 lb 12 oz)   06/21/23 128.9 kg (284 lb 2.8 oz)   06/19/23 128.9 kg (284 lb 2.8 oz)   06/19/23 128.9 kg (284 lb 2.8 oz)   06/07/23 129 kg (284 lb 6.3 oz)       Physical Examination:   Physical Exam  Vitals and nursing note reviewed.   Constitutional:       General: She is not in acute distress.     Appearance: She is not diaphoretic.   HENT:      Head: Normocephalic.      Mouth/Throat:      Pharynx: No oropharyngeal exudate.   Eyes:      General: No scleral icterus.     Conjunctiva/sclera: Conjunctivae normal.   Neck:      Thyroid: No thyromegaly.   Cardiovascular:      Rate and Rhythm: Normal rate and regular rhythm.      Heart sounds: Normal heart sounds. No murmur heard.  Pulmonary:      Effort: Pulmonary effort is normal. No respiratory distress.      Breath sounds: No stridor. No wheezing or rales.   Chest:      Chest wall: No tenderness.   Abdominal:      General: Bowel sounds are normal. There is no distension.      Palpations: Abdomen is soft. There is no mass.      Tenderness: There is no abdominal tenderness. There is no rebound.   Musculoskeletal:         General: No tenderness or deformity. Normal range of motion.      Cervical back: Neck supple.   Lymphadenopathy:      Cervical: No cervical adenopathy.   Skin:     General: Skin is warm and dry.      Findings: No erythema or rash.   Neurological:      Mental Status: She is alert and oriented to person, place, and time.      Cranial Nerves: No cranial " nerve deficit.      Coordination: Coordination normal.      Gait: Gait is intact.   Psychiatric:         Mood and Affect: Affect normal.         Cognition and Memory: Memory normal.         Judgment: Judgment normal.        Diagnostic Tests:  Significant Imaging: I have reviewed and interpreted all pertinent imaging results/findings.  Laboratory Data:  All pertinent labs have been reviewed.    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 Oxaliplatin was stopped due to severe adverse reaction.     Follow-up CTs and PET in May 2021 showed enlarging retroperitoneal node, concerning for metastatic disease.      She started FOLFIRI + Avastin and has continued till July 2022.   Given isolated RP toni disease, she has undergone open Left periaortic and aortocaval lymph node dissection on 7/12/2022.     Follow up scans showed disease progression. I have discussed the case with Dr. Montelongo at [facility]. We discussed resuming FOLFIRI-lloyd at this time and then enrolling to  SWOG 2107 (encorafenib/cetuximab +/- nivo) at the time of progression. Trial is now available at [facility].     Will start the eligibility/ enrollment process and try to start treatment asap. She met with the research team today.    Neoplasm pain  Optimize pain control. Referred to palliative care.      Hydronephrosis   Refer to Urology for co management     Nausea   Continue compazine. Continue sacuso patch for the first week.     Constipation  Start sennakot and daily Miralax.     Transaminitis  Fluctuates.  Continue to monitor. No obvious liver mets.     Hypercalcemia   Mild, secondary to hyperparathyroidism.  Avoid calcium supplements.     Hypothyroidism  Multinodular goiter   Continue Levothyroxine.  Had a non diagnostic biopsy in 2012, usg findings have remained stable. Reviewed repeat thyroid US, stable, will follow up with Endocrine.     Essential HTN   Continue antihypertensives.      Anxiety   Continue to  follow up with Onc psych.    MDM includes  :    - Acute or chronic illness or injury that poses a threat to life or bodily function  - Independent review and explanation of 3+ results from unique tests  - Discussion of management and ordering 3+ unique tests  - Extensive discussion of treatment and management  - Prescription drug management  - Drug therapy requiring intensive monitoring for toxicity          ECOG SCORE               Discussion:   No follow-ups on file.    Plan was discussed with the patient at length, and she verbalized understanding. Gail was given an opportunity to ask questions that were answered to her satisfaction, and she was advised to call in the interval if any problems or questions arise.    Electronically signed by Marah Santo MD        Route Chart for Scheduling    Med Onc Chart Routing      Follow up with physician . 8/21 1:40 pm.   Follow up with TAVO    Infusion scheduling note    Injection scheduling note    Labs    Imaging    Pharmacy appointment    Other referrals            Treatment Plan Information   OP COLORECTAL FOLFIRI + BEVACIZUMAB Q2W   Marah Santo MD   Upcoming Treatment Dates - OP COLORECTAL FOLFIRI + BEVACIZUMAB Q2W    8/13/2023       Pre-Medications       LORazepam (ATIVAN) injection 1 mg       promethazine (PHENERGAN) 6.25 mg in dextrose 5 %  (D5W) 100 mL IVPB       palonosetron 0.25mg/dexAMETHasone 20mg in NS IVPB       Chemotherapy       bevacizumab-awwb (MVASI) 5 mg/kg = 680 mg in sodium chloride 0.9% 100 mL infusion       irinotecan (CAMPTOSAR) 440 mg in sodium chloride 0.9% 522 mL chemo infusion       leucovorin calcium 400 mg/m2 = 1,010 mg in dextrose 5 % (D5W) 250 mL infusion       fluorouracil (ADRUCIL) 6,000 mg in sodium chloride 0.9% 240 mL chemo infusion       Supportive Care       atropine injection 0.4 mg  8/28/2023       Pre-Medications       LORazepam (ATIVAN) injection 1 mg       promethazine (PHENERGAN) 6.25 mg in dextrose 5 % (D5W) 100 mL IVPB       palonosetron 0.25mg/dexAMETHasone 20mg in NS IVPB       Chemotherapy       bevacizumab-awwb (MVASI) 5 mg/kg = 680 mg in sodium chloride 0.9% 100 mL infusion       irinotecan (CAMPTOSAR) 440 mg in sodium chloride 0.9% 522 mL chemo infusion       leucovorin calcium 400 mg/m2 = 1,010 mg in dextrose 5 % (D5W) 250 mL infusion       fluorouracil (ADRUCIL) 6,000 mg in sodium chloride 0.9% 240 mL chemo infusion       Supportive Care       atropine injection 0.4 mg  9/12/2023       Pre-Medications       LORazepam (ATIVAN) injection 1 mg       promethazine (PHENERGAN) 6.25 mg in dextrose 5 % (D5W) 100 mL IVPB       palonosetron 0.25mg/dexAMETHasone 20mg in NS IVPB       Chemotherapy       bevacizumab-awwb (MVASI) 5 mg/kg = 680 mg in sodium chloride 0.9% 100 mL infusion       irinotecan (CAMPTOSAR) 440 mg in sodium chloride 0.9% 522 mL chemo infusion       leucovorin calcium 400 mg/m2 = 1,010 mg in dextrose 5 % (D5W) 250 mL infusion       fluorouracil (ADRUCIL) 6,000 mg in sodium chloride 0.9% 240 mL chemo infusion       Supportive Care       atropine injection 0.4 mg    Supportive Plan Information  IV FLUIDS AND ELECTROLYTES   Brayden Solo MD   Upcoming Treatment Dates - IV FLUIDS AND ELECTROLYTES    No upcoming days in selected categories.    Therapy Plan Information  PORT  FLUSH  Flushes  heparin, porcine (PF) 100 unit/mL injection flush 500 Units  500 Units, Intravenous, Every visit  sodium chloride 0.9% flush 10 mL  10 mL, Intravenous, Every visit          Answers submitted by the patient for this visit:  Review of Systems Questionnaire (Submitted on 8/9/2023)  appetite change : No  unexpected weight change: No  mouth sores: No  visual disturbance: No  adenopathy: No

## 2023-08-16 DIAGNOSIS — C18.7 MALIGNANT NEOPLASM OF SIGMOID COLON: Primary | ICD-10-CM

## 2023-08-16 DIAGNOSIS — Z00.6 RESEARCH EXAM: ICD-10-CM

## 2023-08-17 ENCOUNTER — SPECIALTY PHARMACY (OUTPATIENT)
Dept: PHARMACY | Facility: CLINIC | Age: 53
End: 2023-08-17
Payer: MEDICAID

## 2023-08-17 ENCOUNTER — TELEPHONE (OUTPATIENT)
Dept: PHARMACY | Facility: CLINIC | Age: 53
End: 2023-08-17
Payer: MEDICAID

## 2023-08-17 DIAGNOSIS — C18.7 MALIGNANT NEOPLASM OF SIGMOID COLON: ICD-10-CM

## 2023-08-17 DIAGNOSIS — C18.7 MALIGNANT NEOPLASM OF SIGMOID COLON: Primary | ICD-10-CM

## 2023-08-17 DIAGNOSIS — C77.2 METASTASIS TO INTESTINAL LYMPH NODE: Primary | ICD-10-CM

## 2023-08-17 DIAGNOSIS — Z00.6 RESEARCH EXAM: Primary | ICD-10-CM

## 2023-08-17 DIAGNOSIS — Z00.6 EXAMINATION OF PARTICIPANT IN CLINICAL TRIAL: ICD-10-CM

## 2023-08-17 NOTE — TELEPHONE ENCOUNTER
Specialty Pharmacy - Initial Clinical Assessment    Specialty Medication Orders Linked to Encounter      Flowsheet Row Most Recent Value   Medication #1 encorafenib 75 mg Cap (Order#[id number], Rx#[id number])          Patient Diagnosis   C18.7 - Malignant neoplasm of sigmoid colon    Subjective    Gail Mckinnon is a 53 y.o. female, who is followed by the specialty pharmacy service for management and education.    Recent Encounters       Date Type Provider Description    08/17/2023 Specialty Pharmacy Dell Esqueda PharmD Initial Clinical Assessment    08/17/2023 Specialty Pharmacy Dell Esqueda PharmD Referral Authorization    04/06/2023 Specialty Pharmacy Dell Esqueda PharmD Refill Coordination    03/01/2023 Specialty Pharmacy Kylie Celis PharmD Initial Clinical Assessment    03/01/2023 Specialty Pharmacy Kylie Celis PharmD Referral Authorization            Current Outpatient Medications   Medication Sig Note    buPROPion (WELLBUTRIN SR) 150 MG TBSR 12 hr tablet Take 1 tablet (150 mg total) by mouth 2 (two) times daily. (Patient taking differently: Take 150 mg by mouth once daily.)     encorafenib 75 mg Cap Take 4 tablets (300 mg total) by mouth once daily.     FLUoxetine (PROZAC) 20 MG capsule Take 1 capsule (20 mg total) by mouth once daily.     Lactobacillus rhamnosus GG (CULTURELLE) 10 billion cell capsule Take 1 capsule by mouth once daily.     levothyroxine (SYNTHROID) 200 MCG tablet Take 1 tablet (200 mcg total) by mouth once daily.     LIDOcaine-prilocaine (EMLA) cream Apply topically as needed (30 to 60 min prior chemo or port use).     magic mouthwash diphen/antac/lidoc/nysta Swish and swallow 5 mL every 4 hours as needed for mouth pain/ulcers     multivitamin (THERAGRAN) per tablet Take 1 tablet by mouth once daily.     olmesartan (BENICAR) 20 MG tablet Take 1 tablet (20 mg total) by mouth once daily.     ondansetron (ZOFRAN-ODT) 8 MG TbDL Take 1 tablet (8 mg total) by mouth every  8 (eight) hours as needed.     oxyCODONE-acetaminophen (PERCOCET)  mg per tablet Take 1 tablet by mouth every 6 (six) hours as needed for Pain. 8/17/2023: 7.5 mg dose    prochlorperazine (COMPAZINE) 10 MG tablet Take 1 tablet (10 mg total) by mouth every 6 (six) hours as needed.     senna-docusate 8.6-50 mg (PERICOLACE) 8.6-50 mg per tablet Take 2 tablets by mouth 2 (two) times daily.     acetaminophen (TYLENOL) 500 MG tablet Take 2 tablets (1,000 mg total) by mouth every 8 (eight) hours. (Patient not taking: Reported on 8/14/2023)     granisetron (SANCUSO) 3.1 mg/24 hour Place 1 patch (3.1 mg total) onto the skin every 7 days AS DIRECTED. (Patient not taking: Reported on 8/14/2023)     LORazepam (ATIVAN) 1 MG tablet Take 1 tablet (1 mg total) by mouth every 12 (twelve) hours as needed for Anxiety (nausea). (Patient not taking: Reported on 8/14/2023) 8/17/2023: Takes nightly    traZODone (DESYREL) 50 MG tablet Take 1 tablet (50 mg total) by mouth nightly. (Patient not taking: Reported on 8/14/2023)    Last reviewed on 8/17/2023  4:31 PM by Dell Esqueda, PharmD    Review of patient's allergies indicates:   Allergen Reactions    Oxaliplatin Other (See Comments)     Itchy hands, throat closed up, trouble hearing - during infusion    Penicillins Hives and Rash     childhood allergy  childhood allergy  unknown   Last reviewed on  8/17/2023 4:32 PM by Dell Esqueda    Drug Interactions    Drug interactions evaluated: yes  Clinically relevant drug interactions identified: yes   Interactions list: Cat C: Braftovi and ondansetron - Ondansetron may enhance the QTc-prolonging effect of Braftovi (monitor)     Drug management plan: Cat C: Braftovi and ondansetron - Ondansetron may enhance the QTc-prolonging effect of Braftovi (monitor)   Provided the patient with educational material regarding drug interactions: not applicable               Adverse Effects          Fatigue: Pos (Comment: and malaise)  Constipation:  Pos  Nausea: Pos  Flank pain: Pos (Comment: due to cancer pushing on her kidney)  Nervous/anxious: Pos       Assessment Questions - Documented Responses      Flowsheet Row Most Recent Value   Assessment    Medication Reconciliation completed for patient Yes   During the past 4 weeks, has patient missed any activities due to condition or medication? Missed Work   During the past 4 weeks, did patient have any of the following urgent care visits? None   Goals of Therapy Status Discussed (new start)   Status of the patients ability to self-administer: Is Able   All education points have been covered with patient? Yes, supplemental printed education provided   Welcome packet contents reviewed and discussed with patient? Yes   Assesment completed? Yes   Plan Therapy being initiated   Do you need to open a clinical intervention (i-vent)? No   Do you want to schedule first shipment? Yes   Medication #1 Assessment Info    Patient status New medication, New to [facility]   Is this medication appropriate for the patient? Yes   Is this medication effective? Not yet started          Refill Questions - Documented Responses      Flowsheet Row Most Recent Value   Patient Availability and HIPAA Verification    Does patient want to proceed with activity? Yes   HIPAA/medical authority confirmed? Yes   Relationship to patient of person spoken to? Self   Refill Screening Questions    When does the patient need to receive the medication? 08/21/23   Refill Delivery Questions    How will the patient receive the medication? MEDRx   When does the patient need to receive the medication? 08/21/23   Shipping Address Home   Address in [facility] confirmed and updated if neccessary? Yes   Expected Copay ($) 0   Is the patient able to afford the medication copay? Yes   Payment Method zero copay   Days supply of Refill 28   Supplies needed? No supplies needed   Refill activity completed? Yes   Refill activity plan Refill scheduled  "  Shipment/Pickup Date: 08/18/23            Objective    She has a past medical history of Anxiety, Depression, FH: ovarian cancer (3/16/2020), psychiatric care, Hyperthyroidism, Hypothyroid, Kidney calculi, Malignant neoplasm of sigmoid colon (3/16/2020), Menorrhagia, Multinodular goiter (8/24/2012), Palpitation, Psychiatric problem, and Venous insufficiency.    Tried/failed medications: FOLFOX, FOLFIRI, Avastin    BP Readings from Last 4 Encounters:   08/14/23 117/81   08/02/23 132/72   07/31/23 (!) 149/80   07/31/23 108/68     Ht Readings from Last 4 Encounters:   08/14/23 5' 6" (1.676 m)   07/31/23 5' 6" (1.676 m)   07/31/23 5' 6" (1.676 m)   07/07/23 5' 6" (1.676 m)     Wt Readings from Last 4 Encounters:   08/14/23 130.3 kg (287 lb 4.2 oz)   07/31/23 129.5 kg (285 lb 7.9 oz)   07/31/23 129.5 kg (285 lb 7.9 oz)   07/07/23 127.8 kg (281 lb 12 oz)     Recent Labs   Lab Result Units 08/14/23  0906 07/31/23  0831 06/29/23  0905 06/19/23  0902   RBC M/uL 4.63 4.95 4.72 4.61   Hemoglobin g/dL 14.0 15.0 14.5 14.3   Hematocrit % 44.4 46.8 44.8 43.9   WBC K/uL 5.25 6.00 4.55 4.23   Gran # (ANC) K/uL 3.2 3.6 2.2 2.3   Gran % % 60.0 59.7 48.0 53.7   Platelets K/uL 214 235 272 201   Sodium mmol/L 140 141 141 142   Potassium mmol/L 4.3 4.3 4.0 3.9   Chloride mmol/L 111 H 110 107 109   Glucose mg/dL 111 H 106 108 113 H   BUN mg/dL 14 12 16 10   Creatinine mg/dL 0.9 1.0 1.0 0.9   Calcium mg/dL 10.6 H 10.9 H 10.9 H 10.4   Total Protein g/dL 6.8 7.3 7.2 6.8   Albumin g/dL 3.5 3.7 3.8 3.6   Total Bilirubin mg/dL 0.4 0.6 0.5 0.5   Alkaline Phosphatase U/L 118 142 H 145 H 133   AST U/L 22 44 H 44 H 37   ALT U/L 28 56 H 65 H 52 H     The goals of cancer treatment include:  Achieving remission of cancer, if possible  Reducing tumor size and spread of cancer, if remission is not possible  Minimizing pain and symptoms of the cancer  Preventing infection and other complications of treatment  Promoting adequate nutrition  Encouraging " proper hydration  Improving or maintaining quality of life  Maintaining optimal therapy adherence  Minimizing and managing side effects    Goals of Therapy Status: Discussed (new start)    Assessment/Plan  Patient plans to start therapy on 08/21/23      Indication, dosage, appropriateness, effectiveness, safety and convenience of her specialty medication(s) were reviewed today.     Patient Education   Patient received education on the following:   Expectations and possible outcomes of therapy  Proper use, timely administration, and missed dose management  Duration of therapy  Side effects, including prevention, minimization, and management  Contraindications and safety precautions  New or changed medications, including prescribe and over the counter medications and supplements  Reviews recommended vaccinations, as appropriate  Storage, safe handling, and disposal        Tasks added this encounter   No tasks added.   Tasks due within next 3 months   8/17/2023 - Initial Clinical Assessment/Patient Education (180 Day Reassessment)  8/17/2023 - Set up Initial Fill     Dell Esqueda, PharmD  [facility]  [address]  [facility] [address]  Phone: [phone]  Fax: [phone]

## 2023-08-17 NOTE — TELEPHONE ENCOUNTER
Hello, this is Dell Esqueda, clinical pharmacist with [facility] that is part of your care team.  We have begun working on your prescription that your doctor has sent us. Our next steps include:     Working with your insurance company to obtain approval for your medication  Working with you to ensure your medication is affordable     We will be calling you along the way with updates on your medication but if you have any concerns or receive information that you would like to discuss please reach us at [phone].    Welcome call outcome: Patient/caregiver reached

## 2023-08-18 ENCOUNTER — RESEARCH ENCOUNTER (OUTPATIENT)
Dept: RESEARCH | Facility: HOSPITAL | Age: 53
End: 2023-08-18
Payer: MEDICAID

## 2023-08-18 DIAGNOSIS — Z00.6 RESEARCH EXAM: Primary | ICD-10-CM

## 2023-08-18 NOTE — PROGRESS NOTES
Protocol: SWOG   IRB#: 2023.047  : NGUYEN Degroot MD  Treating Investigator: JESUS Santo MD    Randomized Phase II Trial of Encorafenib and Cetuximab with or Without Nivolumab ([facility] #[id number]) for Patients with Previously Treated, Microsatellite Stable BRAF V600E Metastatic and/or Unresectable Colorectal Cancer     Eligibility Note: 18AUG2023.    Gail Mckinnon, MRN [id number]  PID# [id number]    CRC & second CRC reviewed all patient's eligibility criteria, and patient appears to meet all inclusion & no exclusion criteria. Following second review, this CRC and second CRC signed eligibility form for the referenced patient and study. Dr. Santo determined that due to the patient's negative history of cardiac conditions or any present cardiac symptoms, an ECG is not deemed as necessary.    Eligibility also reviewed by Dr. Santo who confirmed and signed eligibility criteria on 17AUG2023 prior to Step 1 Registration/Randomization. This CRC registered the patient in OPEN on 18AUG2023, and was randomized to TREATMENT ARM 1, Cetuximab + Encorafenib + Nivolumab.     For Stratification questions, per Dr. Santo, the patient received one line of therapy for locally advanced disease and one line of therapy for metastatic disease. See treatment plan print outs in eligibility, and source doc of communications. Two previous lines of therapy was chosen at registration.     Dr. Solo was chosen as treating MD in OPEN since Dr. Santo had not yet been added in CTSU.   Oncore reflects Dr. Santo as treating MD.    Dr. Santo was notified to enter ARM 1 [facility] 2107 treatment plan.  [facility] providing Non-study supplied Encorafenib.  Eliza Mishra, Pharmacist at [facility] ([address] [facility] [address] 3rd Floor [facility]) placed order for the study supplied Nivolumab today.    Cycle 1 Day 1:   Planned for 22AUG2023 (pending arrival of study supplied Nivolumab)  Labs: [facility] [id number] (Magnesium and SOC  Pharmacogenomics)  Office visit: ([facility]) [id number] Dr. Santo  Infusion: ([facility]/3rd [facility]) [id number]      CHANDRIKA Adams RN

## 2023-08-21 ENCOUNTER — LAB VISIT (OUTPATIENT)
Dept: LAB | Facility: HOSPITAL | Age: 53
End: 2023-08-21
Attending: INTERNAL MEDICINE
Payer: MEDICAID

## 2023-08-21 ENCOUNTER — OFFICE VISIT (OUTPATIENT)
Dept: HEMATOLOGY/ONCOLOGY | Facility: CLINIC | Age: 53
End: 2023-08-21
Payer: MEDICAID

## 2023-08-21 ENCOUNTER — DOCUMENTATION ONLY (OUTPATIENT)
Dept: INFUSION THERAPY | Facility: HOSPITAL | Age: 53
End: 2023-08-21
Payer: MEDICAID

## 2023-08-21 ENCOUNTER — RESEARCH ENCOUNTER (OUTPATIENT)
Dept: HEMATOLOGY/ONCOLOGY | Facility: CLINIC | Age: 53
End: 2023-08-21
Payer: MEDICAID

## 2023-08-21 VITALS
BODY MASS INDEX: 46.52 KG/M2 | HEIGHT: 66 IN | RESPIRATION RATE: 16 BRPM | SYSTOLIC BLOOD PRESSURE: 114 MMHG | TEMPERATURE: 98 F | DIASTOLIC BLOOD PRESSURE: 66 MMHG | WEIGHT: 289.44 LBS | HEART RATE: 97 BPM | OXYGEN SATURATION: 98 %

## 2023-08-21 DIAGNOSIS — R21 RASH: ICD-10-CM

## 2023-08-21 DIAGNOSIS — R74.01 TRANSAMINITIS: ICD-10-CM

## 2023-08-21 DIAGNOSIS — F41.9 ANXIETY: ICD-10-CM

## 2023-08-21 DIAGNOSIS — Z00.6 EXAMINATION OF PARTICIPANT IN CLINICAL TRIAL: ICD-10-CM

## 2023-08-21 DIAGNOSIS — E03.9 HYPOTHYROIDISM, UNSPECIFIED TYPE: ICD-10-CM

## 2023-08-21 DIAGNOSIS — C18.7 MALIGNANT NEOPLASM OF SIGMOID COLON: Primary | ICD-10-CM

## 2023-08-21 DIAGNOSIS — R11.0 NAUSEA: ICD-10-CM

## 2023-08-21 DIAGNOSIS — C77.2 METASTASIS TO INTESTINAL LYMPH NODE: ICD-10-CM

## 2023-08-21 DIAGNOSIS — E83.52 HYPERCALCEMIA: Primary | ICD-10-CM

## 2023-08-21 DIAGNOSIS — C18.7 MALIGNANT NEOPLASM OF SIGMOID COLON: ICD-10-CM

## 2023-08-21 DIAGNOSIS — C77.9 SECONDARY ADENOCARCINOMA OF LYMPH NODE: ICD-10-CM

## 2023-08-21 DIAGNOSIS — E83.52 HYPERCALCEMIA: ICD-10-CM

## 2023-08-21 DIAGNOSIS — Z00.6 RESEARCH EXAM: ICD-10-CM

## 2023-08-21 PROBLEM — C78.6 SECONDARY MALIGNANT NEOPLASM OF RETROPERITONEUM: Status: ACTIVE | Noted: 2022-05-27

## 2023-08-21 LAB
MAGNESIUM SERPL-MCNC: 2 MG/DL (ref 1.6–2.6)
PTH-INTACT SERPL-MCNC: 217.8 PG/ML (ref 9–77)
T4 FREE SERPL-MCNC: 0.99 NG/DL (ref 0.71–1.51)
TSH SERPL DL<=0.005 MIU/L-ACNC: 8.8 UIU/ML (ref 0.4–4)

## 2023-08-21 PROCEDURE — 99215 OFFICE O/P EST HI 40 MIN: CPT | Mod: S$PBB,,, | Performed by: INTERNAL MEDICINE

## 2023-08-21 PROCEDURE — 3074F PR MOST RECENT SYSTOLIC BLOOD PRESSURE < 130 MM HG: ICD-10-PCS | Mod: CPTII,,, | Performed by: INTERNAL MEDICINE

## 2023-08-21 PROCEDURE — 83970 ASSAY OF PARATHORMONE: CPT | Performed by: INTERNAL MEDICINE

## 2023-08-21 PROCEDURE — 84439 ASSAY OF FREE THYROXINE: CPT | Performed by: INTERNAL MEDICINE

## 2023-08-21 PROCEDURE — 3008F PR BODY MASS INDEX (BMI) DOCUMENTED: ICD-10-PCS | Mod: CPTII,,, | Performed by: INTERNAL MEDICINE

## 2023-08-21 PROCEDURE — 83735 ASSAY OF MAGNESIUM: CPT | Mod: PN | Performed by: INTERNAL MEDICINE

## 2023-08-21 PROCEDURE — 3008F BODY MASS INDEX DOCD: CPT | Mod: CPTII,,, | Performed by: INTERNAL MEDICINE

## 2023-08-21 PROCEDURE — 99999 PR PBB SHADOW E&M-EST. PATIENT-LVL IV: ICD-10-PCS | Mod: PBBFAC,,, | Performed by: INTERNAL MEDICINE

## 2023-08-21 PROCEDURE — 99999 PR PBB SHADOW E&M-EST. PATIENT-LVL IV: CPT | Mod: PBBFAC,,, | Performed by: INTERNAL MEDICINE

## 2023-08-21 PROCEDURE — 3074F SYST BP LT 130 MM HG: CPT | Mod: CPTII,,, | Performed by: INTERNAL MEDICINE

## 2023-08-21 PROCEDURE — 4010F ACE/ARB THERAPY RXD/TAKEN: CPT | Mod: CPTII,,, | Performed by: INTERNAL MEDICINE

## 2023-08-21 PROCEDURE — 36415 COLL VENOUS BLD VENIPUNCTURE: CPT | Mod: PN | Performed by: INTERNAL MEDICINE

## 2023-08-21 PROCEDURE — 3078F DIAST BP <80 MM HG: CPT | Mod: CPTII,,, | Performed by: INTERNAL MEDICINE

## 2023-08-21 PROCEDURE — 4010F PR ACE/ARB THEARPY RXD/TAKEN: ICD-10-PCS | Mod: CPTII,,, | Performed by: INTERNAL MEDICINE

## 2023-08-21 PROCEDURE — 99214 OFFICE O/P EST MOD 30 MIN: CPT | Mod: PBBFAC,PN | Performed by: INTERNAL MEDICINE

## 2023-08-21 PROCEDURE — 3078F PR MOST RECENT DIASTOLIC BLOOD PRESSURE < 80 MM HG: ICD-10-PCS | Mod: CPTII,,, | Performed by: INTERNAL MEDICINE

## 2023-08-21 PROCEDURE — 99215 PR OFFICE/OUTPT VISIT, EST, LEVL V, 40-54 MIN: ICD-10-PCS | Mod: S$PBB,,, | Performed by: INTERNAL MEDICINE

## 2023-08-21 PROCEDURE — 84443 ASSAY THYROID STIM HORMONE: CPT | Performed by: INTERNAL MEDICINE

## 2023-08-21 RX ORDER — HEPARIN 100 UNIT/ML
500 SYRINGE INTRAVENOUS
Status: CANCELLED | OUTPATIENT
Start: 2023-08-24

## 2023-08-21 RX ORDER — SODIUM CHLORIDE 0.9 % (FLUSH) 0.9 %
10 SYRINGE (ML) INJECTION
Status: CANCELLED | OUTPATIENT
Start: 2023-09-08

## 2023-08-21 RX ORDER — SODIUM CHLORIDE 0.9 % (FLUSH) 0.9 %
10 SYRINGE (ML) INJECTION
Status: CANCELLED | OUTPATIENT
Start: 2023-08-24

## 2023-08-21 RX ORDER — ACETAMINOPHEN 325 MG/1
650 TABLET ORAL
Status: CANCELLED | OUTPATIENT
Start: 2023-09-08

## 2023-08-21 RX ORDER — DIPHENHYDRAMINE HYDROCHLORIDE 50 MG/ML
50 INJECTION INTRAMUSCULAR; INTRAVENOUS ONCE AS NEEDED
Status: CANCELLED | OUTPATIENT
Start: 2023-09-08

## 2023-08-21 RX ORDER — DIPHENHYDRAMINE HCL 50 MG
50 CAPSULE ORAL
Status: CANCELLED | OUTPATIENT
Start: 2023-09-07

## 2023-08-21 RX ORDER — EPINEPHRINE 0.3 MG/.3ML
0.3 INJECTION SUBCUTANEOUS ONCE AS NEEDED
Status: CANCELLED | OUTPATIENT
Start: 2023-09-08

## 2023-08-21 RX ORDER — HEPARIN 100 UNIT/ML
500 SYRINGE INTRAVENOUS
Status: CANCELLED | OUTPATIENT
Start: 2023-09-08

## 2023-08-21 RX ORDER — EPINEPHRINE 0.3 MG/.3ML
0.3 INJECTION SUBCUTANEOUS ONCE AS NEEDED
Status: CANCELLED | OUTPATIENT
Start: 2023-08-24

## 2023-08-21 RX ORDER — CLINDAMYCIN PHOSPHATE 10 MG/G
GEL TOPICAL 2 TIMES DAILY
Qty: 60 G | Refills: 3 | Status: SHIPPED | OUTPATIENT
Start: 2023-08-21 | End: 2024-01-27 | Stop reason: CLARIF

## 2023-08-21 RX ORDER — DIPHENHYDRAMINE HYDROCHLORIDE 50 MG/ML
50 INJECTION INTRAMUSCULAR; INTRAVENOUS ONCE AS NEEDED
Status: CANCELLED | OUTPATIENT
Start: 2023-08-24

## 2023-08-21 RX ORDER — DIPHENHYDRAMINE HCL 50 MG
50 CAPSULE ORAL
Status: CANCELLED | OUTPATIENT
Start: 2023-08-24

## 2023-08-21 RX ORDER — ACETAMINOPHEN 325 MG/1
650 TABLET ORAL
Status: CANCELLED | OUTPATIENT
Start: 2023-08-24

## 2023-08-21 NOTE — PROGRESS NOTES
JULIANNE met with pt. Pt in need of assistance with her rent. She is behind for August. She has talked with her children about helping. They should be able to help in September. JULIANNE provided pt with number to [employer] info line 211. She will call to request numbers for places helping with rent in her area.     JULIANNE will contact [facility] to inquire into possible assistance as well.   JULIANNE did provide pt with food from [employer] today.    Pt shared she will be starting in a trail because her cancer is growing again. JULIANNE provided emotional support. Pt trying to keep a positive outlook.     Radha Giordano, JENNIEW

## 2023-08-21 NOTE — PROGRESS NOTES
Gail Mckinnon MRN [id number]  Protocol: SWOG   IRB#: 2023.047  : NGUYEN Degroot MD  Treating Investigator: JESUS Santo MD     Randomized Phase II Trial of Encorafenib and Cetuximab with or Without Nivolumab ([facility] #[id number]) for Patients with Previously Treated, Microsatellite Stable BRAF V600E Metastatic and/or Unresectable Colorectal Cancer      Baseline Visit prior to start of Arm 1/ Nivolumab + Cetuximab + Encorafenib therapy.    [id number].     PID# [id number]    Eligibility determined 18AUG2023-See CRC note.    The patient presented to the planned office visit alone. The patient was alert, oriented, without noted distress, with appropriate mood and affect for the situation, and freely agreed to continue with participating in the referenced study.    Labs and study specific bio-specimens collect prior to office visit with the treating MD.     [id number] CBC w/ diff, CMP being used for Cycle 1 Day 1  21AUG2023 Labs included:  Magnesium- WNL  Labs resulted 22AUG2023  TSH- elevated @ 8.7uIU/ml [0.400-4.000 uIU/mL] Baseline drawn r/t patient's diagnosis of hypothyroidism and planned Nivolumab. The patient is currently taking Levothyroxine 200 mcg QD  T4, Free @ 0.99 ng/dL [0.71- 1.51 ng/dL]  PTH elevated @ 217.8 pg/mL [9.0-77.0 pg/mL] Baseline drawn r/t hypercalcemia (see corrected calcium source doc) and history of elevated PTH 9/2021 and diagnosis of Multinodular Goiter.   Corrected calcium calculated as 11.00 mg/dL.    Protocol Bio-specimens collected by the phlebotomist with this CRC present. Two patient identifiers used prior to collection. Specimens shipped 21AUG2023 (see shadow chart).  Pharmacogenomic panel collected and pending results.    Communications with the treating MD is aware, did not deem the abnormal TSH and PTH results as CS, and no new orders given. Will continue to monitor.  Communications with the patient's endocrinologist, Dr. Moss, resulted in an increase in Levothyroxine dose, with  "6 week follow up to recheck TSH, Renal Panel, PTH, Vit D and 24 hr urine Ca/Cr.  See printed source docs under this CRC note.    Recist documentation reviewed with the treating MD.  Amanda PS: One, per treating MD.    Dermatologic examination performed by the treating MD:   Rash-Grade 1 noted on face only. Skin appeared reddened with non-pustular papules, pt. denies itchy or tenderness.  The treating MD chose to not treat empirically for cetuximab acneiform rash. However, Dr. Santo did prescribe clindamycin 1% topical ointment BID to use for prn acne rash.    /66 (BP Location: Left arm, Patient Position: Sitting, BP Method: Large (Manual)) Pulse 97 Temp 97.8 °F (36.6 °C) (Temporal) Resp 16  Ht 5' 6" (1.676 m) Wt 131.3 kg (289 lb 7.4 oz)  LMP 01/22/2020 SpO2 98% BSA 2.47 m²     Baseline BSA 2.46 m2 based on Screening:   Ht. 5'6 and 287.26 lbs. (130.3 kg)     TIME OUT for dosing of Cetuximab and Nivolumab completed with the treating MD. Orders were signed prior to the visit in order to submit for authorization. Dosing also reviewed with Juli Grace RN  Cetuximab 500 mg/m2 x 2.46 m2 BSA = 1230 mg over 2 hours.  Nivolumab Flat Dose of 480 mg over 30 minutes.  Reminders for infusion highlight that Nivolumab should not be initiated until 30 minutes post completion of Cetuximab.    Baseline Medical History completed with patient for accuracy (see shadow) chart.  *Noted that the patient had a diagnosis of Grave's disease. Per patient, the diagnosis was made in 1989 and treated with radioactive iodine. Eligibility allows for diagnosis of Graves' Disease. Treating MD made aware.    [employer] list reviewed again for accuracy at this visit (see shadow chart).    The patient brought the specialty pharmacy provided Encorafenib. # Dispensed = 120 Capsules (2 bottles of 60).  It was reviewed with the patient that the planned 28-day cycle would equal #112 capsules, and that she would bring the remaining capsules back at " the end of the cycle for disposal by the [facility] pharmacy.  The patient verbalized understanding to take Encorafenib #4 capsule with water in the morning at the same time each day. Pt. also agreed to taking before leaving home on the days of infusional therapy in order to provide one hour time frame before receiving oral pre-mediacations.    [facility] diarrhea protocol reviewed and given to patient.  Protocol pill diary given to pt' and instructions on missed doses were reviewed.  Girltankedex patient education material on Encorafenib given to patient.    The patient was made aware that this CRC would be out of the office on 8/24/2023, but Juli Grace RN and IVIS Schmitz would be available on that day or before, if needed.  The patient admitting to having contact info for IVIS Schmitz.  After hours phone number was also provided to the patient.    The patient denied any questions at the end of the visit and ambulated out of the office.     Cycle 1 Day 1:   Planned for [id number] @ 0930  Chemo education: [id number] @ 0800    C1 D15:  Labs: [facility] 06SEP2023  Office visit: ([facility]) 07SEP2023 Dr. Santo  Infusion: ([facility]/3rd floor) 07SEP2023      CHANDRIKA Adams RN

## 2023-08-21 NOTE — PROGRESS NOTES
PROGRESS NOTE    Subjective:       Patient ID: Gail Mckinnon is a 53 y.o. female.  MRN: [id number]  : 1970    Chief Complaint: Metastatic colon cancer     History of Present Illness:   Gail Mckinnon is a 53 y.o. female who presents with colon cancer, initially stage III and now with LN recurrence.    On FOLIRI+ Avastin sine .     She was briefly switched to xeloda due to 5 FU shortage for a month. Did not tolerate Xeloda well due hand foot syndrome, blistering of feet, did not take the last day of Xeloda. Completed .     Resumed Folfiri + Avastin on 23. Atropine was held due to prior constipation.     Was admitted to the hospital from -23 for intractable nausea , vomiting and diarrhea as well as abdominal pain. No hematochezia or dark stool was noted.      US showed gallbladder stone and dilated CBD. She was managed conservatively. Had CT abd, pelvis, colonoscopy and MRCP that were relatively unremarkable without signs of cholecystitis. Cholecystectomy was not recommended.     She had recently resumed FOLFIRI and the symptoms started after the first cycle of resuming, never had issues prior to this.  Stayed in the hospital for 10 days.  C diff was negative. No other etiology was established. Symptoms improved over time and she was discharged on .     Interim history:  Enrolled in  SWOG 2107 (encorafenib/cetuximab and randomized to the Nivolumab arm), here to obtain for cycle 1. Seen with the research team.     Still has left flank pain, has been using Ibuprofen and percocet without significant relief. Has seen Urology, no intervention at this time.     Aware of Grave's disease history in , treated with LEE, now on synthroid.     Oncology History:  She completed adjuvant FOLFOX in 2020. She tolerated only 4 cycles of FOLFOX before she developed an infusion reaction to oxaliplatin in cycle 5 was well as  cycle 6. She then completed 6 cycles of infusional 5 FU.     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 She was offered second line therapy with FOLFIRI and Avastin.      She presented to the ED on 11/26 with left flank pain. CT renal stone study showed Moderate hydronephrosis on the left secondary to 3 mm calculus at the UPJ.    She had a PET scan mid April that showed possible progression of her disease with      She has been to [facility] for another opinion. No change was recommended in systemic therapy but she was offered surgical removal of the aorta caval nodes.  Recent sans at [facility]  show stable disease.      Surgery done 7/12/22. Received 2 more cycle of FOLFIRI without Avastin.     She had repeat imaging at [facility] on 9/24/22 that unfortunately showed disease progression since her surgery with multiple RP nodes and one mediastinal node.       PET 12/8/22  Impression:     1. Progression of disease with interval increase of abdominal and pelvic lymphadenopathy.  2. New area of moderate FDG uptake at the right half of the T9 vertebral body (image 124).  Favor degenerative disc changes.  No underlying osteolytic or osteoblastic lesion.  Recommend continued attention on follow-up.  3. No other evidence of FDG avid disease.     12/22/22  Impression After scan comparison:     1. Metastatic portacaval and left periaortic retroperitoneal lymph nodes which remain stable between the CT of 09/24/2022 and the subsequent PET-CT of 12/08/2022.  There is no evidence of progression of metastatic disease.  2. Nonobstructing bilateral renal calculi and cholelithiasis.    2/10/22:  CT chest abd pelvis  Impression:     Stable periportal, retroperitoneal and mesenteric lymphadenopathy compared to PET-CT 12/08/2022.     Stable right middle lobe 3 mm pulmonary nodule.  No new or enlarging pulmonary nodule.     Hepatic steatosis.  Hepatosplenomegaly.     Cholelithiasis.     Bilateral nonobstructing  nephrolithiasis.     Small hiatal hernia with retained contrast in the distal esophagus.  Correlate for reflux.    She had virtual visit at [facility] on 2/17/23.     She has continued FOLFIRI and Avastin.     CT abd pelvis   5/7/23  Impression:     1. Mesenteric inflammatory changes have worsened since the prior study.  2. Mesenteric, retroperitoneal and left-sided pelvic enlarged lymph nodes are unchanged.     8/11/23  CT chest abd pelvis   Impression:     1. Patient with a history of colon adenocarcinoma with worsening periaortic, retroperitoneal, mesenteric, and iliac chain lymphadenopathy the in the abdomen and pelvis when compared with the previous study suggesting worsening metastatic disease.  2. Moderate left hydronephrosis and proximal left hydroureter to the level of possible retroperitoneal scarring.  Invasion/compression of the left ureter is not excluded.  3. Bilateral nephrolithiasis  4. Hepatomegaly and hepatic steatosis  5. Two tiny < 5 mm pulmonary nodules within the chest, unchanged.  No new pulmonary nodules.    Oncology History:  Oncology History   Malignant neoplasm of sigmoid colon   3/16/2020 Initial Diagnosis    Malignant neoplasm of sigmoid colon     3/31/2020 Cancer Staged    Staging form: Colon and Rectum, AJCC 8th Edition  - Clinical stage from 3/31/2020: Stage IIIC (cT4b, cN2a, cM0)     5/6/2020 - 11/17/2020 Chemotherapy    Treatment Summary   Plan Name: OP FOLFOX 6 Q2W  Treatment Goal: Curative  Status: Inactive  Start Date: 5/6/2020  End Date: 11/6/2020  Provider: Dylan Leyva MD  Chemotherapy: fluorouraciL injection 945 mg, 400 mg/m2 = 945 mg, Intravenous, Clinic/[facility] 1 time, 14 of 14 cycles  Administration: 945 mg (5/6/2020), 945 mg (5/20/2020), 945 mg (6/3/2020), 945 mg (6/17/2020), 945 mg (7/29/2020), 945 mg (8/12/2020), 945 mg (8/26/2020), 945 mg (9/9/2020), 945 mg (9/23/2020), 945 mg (10/6/2020), 945 mg (10/21/2020), 945 mg (11/4/2020)  fluorouraciL 2,400 mg/m2 = 5,665 mg in sodium  chloride 0.9% 240 mL chemo infusion, 2,400 mg/m2 = 5,665 mg, Intravenous, Over 46 hours, 14 of 14 cycles  Administration: 5,665 mg (5/6/2020), 5,665 mg (5/20/2020), 5,665 mg (6/3/2020), 5,665 mg (6/17/2020), 5,665 mg (7/29/2020), 5,665 mg (8/12/2020), 5,665 mg (8/26/2020), 5,665 mg (9/9/2020), 5,665 mg (9/23/2020), 5,665 mg (10/6/2020), 5,665 mg (10/21/2020), 5,665 mg (11/4/2020)  leucovorin calcium 900 mg in dextrose 5 % 250 mL infusion, 945 mg, Intravenous, Clinic/[facility] 1 time, 14 of 14 cycles  Administration: 900 mg (5/6/2020), 900 mg (5/20/2020), 900 mg (6/3/2020), 900 mg (6/17/2020), 945 mg (6/30/2020), 945 mg (7/20/2020), 945 mg (7/29/2020), 945 mg (8/12/2020), 945 mg (8/26/2020), 945 mg (9/9/2020), 945 mg (9/23/2020), 945 mg (10/6/2020), 945 mg (10/21/2020), 945 mg (11/4/2020)  oxaliplatin (ELOXATIN) 200 mg in dextrose 5 % 500 mL chemo infusion, 201 mg, Intravenous, Clinic/[facility] 1 time, 6 of 6 cycles  Dose modification: 65 mg/m2 (original dose 85 mg/m2, Cycle 6)  Administration: 200 mg (5/6/2020), 200 mg (5/20/2020), 200 mg (6/3/2020), 200 mg (6/17/2020), 201 mg (6/30/2020), 150 mg (7/20/2020)     5/3/2021 Cancer Staged    Staging form: Colon and Rectum, AJCC 8th Edition  - Clinical stage from 5/3/2021: Stage Unknown (rcT0, cNX, cM1)     6/16/2021 - 8/2/2023 Chemotherapy    Treatment Summary   Plan Name: OP COLORECTAL FOLFIRI + BEVACIZUMAB Q2W  Treatment Goal: Control  Status: Inactive  Start Date: 6/16/2021  End Date: 8/2/2023  Provider: Marah Santo MD  Chemotherapy: fluorouraciL injection 990 mg, 400 mg/m2 = 990 mg, Intravenous, Clinic/[facility] 1 time, 15 of 15 cycles  Administration: 990 mg (6/16/2021), 990 mg (6/30/2021), 990 mg (7/14/2021), 980 mg (7/28/2021), 990 mg (8/11/2021), 990 mg (8/25/2021), 990 mg (9/8/2021), 990 mg (9/22/2021), 990 mg (10/6/2021), 990 mg (10/20/2021), 990 mg (11/3/2021), 990 mg (12/1/2021), 990 mg (12/15/2021), 990 mg (11/17/2021), 990 mg (12/28/2021)  fluorouraciL 2,400 mg/m2 =  5,950 mg in sodium chloride 0.9% 240 mL chemo infusion, 2,400 mg/m2 = 5,950 mg, Intravenous, Over 46 hours, 43 of 46 cycles  Administration: 5,950 mg (6/16/2021), 5,950 mg (6/30/2021), 5,950 mg (7/14/2021), 5,880 mg (7/28/2021), 5,930 mg (8/11/2021), 5,930 mg (8/25/2021), 5,930 mg (9/8/2021), 5,930 mg (9/22/2021), 5,930 mg (10/6/2021), 5,930 mg (10/20/2021), 5,930 mg (11/3/2021), 5,930 mg (12/1/2021), 5,930 mg (12/15/2021), 5,930 mg (11/17/2021), 5,930 mg (12/28/2021), 6,050 mg (5/11/2022), 6,025 mg (3/9/2022), 6,025 mg (2/23/2022), 5,930 mg (2/2/2022), 5,930 mg (1/19/2022), 6,050 mg (4/20/2022), 6,025 mg (4/6/2022), 6,025 mg (3/23/2022), 6,050 mg (6/1/2022), 6,050 mg (6/15/2022), 6,050 mg (10/19/2022), 6,050 mg (10/5/2022), 6,050 mg (11/2/2022), 6,050 mg (11/16/2022), 6,050 mg (11/30/2022), 6,050 mg (1/4/2023), 6,050 mg (12/19/2022), 6,050 mg (1/18/2023), 6,000 mg (5/22/2023), 6,000 mg (4/26/2023), 6,000 mg (4/11/2023), 6,000 mg (2/28/2023), 6,050 mg (2/14/2023), 6,000 mg (2/1/2023), 6,000 mg (7/5/2023), 6,000 mg (6/19/2023), 6,000 mg (6/5/2023), 6,000 mg (7/31/2023)  bevacizumab (AVASTIN) 600 mg in sodium chloride 0.9% 100 mL chemo infusion, 660 mg, Intravenous, [facility] 1 time, 36 of 36 cycles  Dose modification: 5 mg/kg (original dose 5 mg/kg, Cycle 26, Reason: MD Discretion, Comment: 5 mg/kg original dose)  Administration: 600 mg (6/30/2021), 600 mg (7/14/2021), 600 mg (7/28/2021), 600 mg (6/16/2021), 600 mg (8/11/2021), 655 mg (8/25/2021), 600 mg (9/8/2021), 600 mg (9/22/2021), 600 mg (10/6/2021), 600 mg (10/20/2021), 600 mg (11/3/2021), 655 mg (12/1/2021), 600 mg (12/15/2021), 600 mg (11/17/2021), 600 mg (12/28/2021), 600 mg (5/11/2022), 600 mg (3/9/2022), 600 mg (2/23/2022), 600 mg (2/2/2022), 600 mg (1/19/2022), 600 mg (4/20/2022), 680 mg (4/6/2022), 600 mg (3/23/2022), 680 mg (10/5/2022), 680 mg (10/19/2022), 680 mg (11/2/2022), 680 mg (11/16/2022), 680 mg (11/30/2022), 680 mg (1/4/2023), 680 mg  (12/19/2022), 680 mg (1/18/2023), 680 mg (3/28/2023), 680 mg (3/14/2023), 680 mg (2/28/2023), 680 mg (2/14/2023), 680 mg (2/1/2023)  irinotecan (CAMPTOSAR) 440 mg in sodium chloride 0.9% 500 mL chemo infusion, 446 mg, Intravenous, Clinic/[facility] 1 time, 45 of 48 cycles  Dose modification: 180 mg/m2 (original dose 180 mg/m2, Cycle 24)  Administration: 440 mg (6/16/2021), 440 mg (6/30/2021), 440 mg (7/14/2021), 440 mg (7/28/2021), 440 mg (8/11/2021), 444 mg (8/25/2021), 440 mg (9/8/2021), 440 mg (9/22/2021), 440 mg (10/6/2021), 440 mg (10/20/2021), 440 mg (11/3/2021), 440 mg (12/1/2021), 440 mg (12/15/2021), 440 mg (11/17/2021), 440 mg (12/28/2021), 440 mg (5/11/2022), 440 mg (3/9/2022), 440 mg (2/23/2022), 440 mg (2/2/2022), 440 mg (1/19/2022), 440 mg (4/20/2022), 440 mg (4/6/2022), 440 mg (3/24/2022), 440 mg (6/1/2022), 440 mg (6/15/2022), 440 mg (10/19/2022), 440 mg (10/5/2022), 440 mg (11/2/2022), 440 mg (11/16/2022), 440 mg (11/30/2022), 440 mg (1/4/2023), 440 mg (12/19/2022), 440 mg (1/18/2023), 440 mg (5/22/2023), 440 mg (4/26/2023), 440 mg (4/11/2023), 440 mg (3/28/2023), 440 mg (3/14/2023), 440 mg (2/28/2023), 440 mg (2/14/2023), 440 mg (2/1/2023), 440 mg (7/5/2023), 440 mg (6/19/2023), 440 mg (6/5/2023), 440 mg (7/31/2023)  bevacizumab-awwb (MVASI) 5 mg/kg = 680 mg in sodium chloride 0.9% 100 mL infusion, 5 mg/kg = 680 mg (100 % of original dose 5 mg/kg), Intravenous, Clinic/[facility] 1 time, 7 of 10 cycles  Dose modification: 5 mg/kg (original dose 5 mg/kg, Cycle 39)  Administration: 680 mg (4/11/2023), 680 mg (4/26/2023), 680 mg (5/22/2023), 680 mg (7/5/2023), 680 mg (6/19/2023), 680 mg (6/5/2023), 680 mg (7/31/2023)     8/17/2023 - 8/18/2023 Chemotherapy    Treatment Summary   Plan Name: OP CETUXIMAB 500MG Q2W  Treatment Goal: Control  Status: Inactive  Start Date:   End Date:   Provider: Marah Santo MD  Chemotherapy: [No matching medication found in this treatment plan]     8/22/2023 -  Chemotherapy     Treatment Summary   Plan Name: [facility] - ARM 1 ENCORAFENIB  CETUXIMAB NIVOLUMAB  Treatment Goal: Palliative  Status: Active  Start Date: 8/22/2023 (Planned)  End Date: 7/9/2024 (Planned)  Provider: Marah Santo MD  Chemotherapy: cetuximab (ERBITUX) 100 mg/50 mL chemo infusion 1,230 mg, 500 mg/m2 = 1,230 mg, Intravenous, Clinic/[facility] 1 time, 0 of 12 cycles  encorafenib 75 mg Cap, 300 mg, Oral, Daily, 0 of 1 cycle, Start date: --, End date: --     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   4/3/2020 Initial Diagnosis    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     5/6/2020 - 11/17/2020 Chemotherapy    Treatment Summary   Plan Name: OP FOLFOX 6 Q2W  Treatment Goal: Curative  Status: Inactive  Start Date: 5/6/2020  End Date: 11/6/2020  Provider: Dylan Leyva MD  Chemotherapy: fluorouraciL injection 945 mg, 400 mg/m2 = 945 mg, Intravenous, Clinic/[facility] 1 time, 14 of 14 cycles  Administration: 945 mg (5/6/2020), 945 mg (5/20/2020), 945 mg (6/3/2020), 945 mg (6/17/2020), 945 mg (7/29/2020), 945 mg (8/12/2020), 945 mg (8/26/2020), 945 mg (9/9/2020), 945 mg (9/23/2020), 945 mg (10/6/2020), 945 mg (10/21/2020), 945 mg (11/4/2020)  fluorouraciL 2,400 mg/m2 = 5,665 mg in sodium chloride 0.9% 240 mL chemo infusion, 2,400 mg/m2 = 5,665 mg, Intravenous, Over 46 hours, 14 of 14 cycles  Administration: 5,665 mg (5/6/2020), 5,665 mg (5/20/2020), 5,665 mg (6/3/2020), 5,665 mg (6/17/2020), 5,665 mg (7/29/2020), 5,665 mg (8/12/2020), 5,665 mg (8/26/2020), 5,665 mg (9/9/2020), 5,665 mg (9/23/2020), 5,665 mg (10/6/2020), 5,665 mg (10/21/2020), 5,665 mg (11/4/2020)  leucovorin calcium 900 mg in dextrose 5 % 250 mL infusion, 945 mg, Intravenous, Clinic/[facility] 1 time, 14 of 14 cycles  Administration: 900 mg (5/6/2020), 900 mg (5/20/2020), 900 mg (6/3/2020), 900 mg (6/17/2020), 945 mg (6/30/2020), 945 mg (7/20/2020), 945 mg (7/29/2020), 945 mg (8/12/2020), 945 mg (8/26/2020), 945 mg (9/9/2020), 945 mg (9/23/2020), 945 mg (10/6/2020), 945 mg  (10/21/2020), 945 mg (11/4/2020)  oxaliplatin (ELOXATIN) 200 mg in dextrose 5 % 500 mL chemo infusion, 201 mg, Intravenous, Clinic/[facility] 1 time, 6 of 6 cycles  Dose modification: 65 mg/m2 (original dose 85 mg/m2, Cycle 6)  Administration: 200 mg (5/6/2020), 200 mg (5/20/2020), 200 mg (6/3/2020), 200 mg (6/17/2020), 201 mg (6/30/2020), 150 mg (7/20/2020)     6/16/2021 - 8/2/2023 Chemotherapy    Treatment Summary   Plan Name: OP COLORECTAL FOLFIRI + BEVACIZUMAB Q2W  Treatment Goal: Control  Status: Inactive  Start Date: 6/16/2021  End Date: 8/2/2023  Provider: Marah Santo MD  Chemotherapy: fluorouraciL injection 990 mg, 400 mg/m2 = 990 mg, Intravenous, Clinic/[facility] 1 time, 15 of 15 cycles  Administration: 990 mg (6/16/2021), 990 mg (6/30/2021), 990 mg (7/14/2021), 980 mg (7/28/2021), 990 mg (8/11/2021), 990 mg (8/25/2021), 990 mg (9/8/2021), 990 mg (9/22/2021), 990 mg (10/6/2021), 990 mg (10/20/2021), 990 mg (11/3/2021), 990 mg (12/1/2021), 990 mg (12/15/2021), 990 mg (11/17/2021), 990 mg (12/28/2021)  fluorouraciL 2,400 mg/m2 = 5,950 mg in sodium chloride 0.9% 240 mL chemo infusion, 2,400 mg/m2 = 5,950 mg, Intravenous, Over 46 hours, 43 of 46 cycles  Administration: 5,950 mg (6/16/2021), 5,950 mg (6/30/2021), 5,950 mg (7/14/2021), 5,880 mg (7/28/2021), 5,930 mg (8/11/2021), 5,930 mg (8/25/2021), 5,930 mg (9/8/2021), 5,930 mg (9/22/2021), 5,930 mg (10/6/2021), 5,930 mg (10/20/2021), 5,930 mg (11/3/2021), 5,930 mg (12/1/2021), 5,930 mg (12/15/2021), 5,930 mg (11/17/2021), 5,930 mg (12/28/2021), 6,050 mg (5/11/2022), 6,025 mg (3/9/2022), 6,025 mg (2/23/2022), 5,930 mg (2/2/2022), 5,930 mg (1/19/2022), 6,050 mg (4/20/2022), 6,025 mg (4/6/2022), 6,025 mg (3/23/2022), 6,050 mg (6/1/2022), 6,050 mg (6/15/2022), 6,050 mg (10/19/2022), 6,050 mg (10/5/2022), 6,050 mg (11/2/2022), 6,050 mg (11/16/2022), 6,050 mg (11/30/2022), 6,050 mg (1/4/2023), 6,050 mg (12/19/2022), 6,050 mg (1/18/2023), 6,000 mg (5/22/2023), 6,000 mg  (4/26/2023), 6,000 mg (4/11/2023), 6,000 mg (2/28/2023), 6,050 mg (2/14/2023), 6,000 mg (2/1/2023), 6,000 mg (7/5/2023), 6,000 mg (6/19/2023), 6,000 mg (6/5/2023), 6,000 mg (7/31/2023)  bevacizumab (AVASTIN) 600 mg in sodium chloride 0.9% 100 mL chemo infusion, 660 mg, Intravenous, [facility] [place] [facility]\"" 1 time, 36 of 36 cycles  Dose modification: 5 mg/kg (original dose 5 mg/kg, Cycle 26, Reason: MD Discretion, Comment: 5 mg/kg original dose)  Administration: 600 mg (6/30/2021), 600 mg (7/14/2021), 600 mg (7/28/2021), 600 mg (6/16/2021), 600 mg (8/11/2021), 655 mg (8/25/2021), 600 mg (9/8/2021), 600 mg (9/22/2021), 600 mg (10/6/2021), 600 mg (10/20/2021), 600 mg (11/3/2021), 655 mg (12/1/2021), 600 mg (12/15/2021), 600 mg (11/17/2021), 600 mg (12/28/2021), 600 mg (5/11/2022), 600 mg (3/9/2022), 600 mg (2/23/2022), 600 mg (2/2/2022), 600 mg (1/19/2022), 600 mg (4/20/2022), 680 mg (4/6/2022), 600 mg (3/23/2022), 680 mg (10/5/2022), 680 mg (10/19/2022), 680 mg (11/2/2022), 680 mg (11/16/2022), 680 mg (11/30/2022), 680 mg (1/4/2023), 680 mg (12/19/2022), 680 mg (1/18/2023), 680 mg (3/28/2023), 680 mg (3/14/2023), 680 mg (2/28/2023), 680 mg (2/14/2023), 680 mg (2/1/2023)  irinotecan (CAMPTOSAR) 440 mg in sodium chloride 0.9% 500 mL chemo infusion, 446 mg, Intravenous, [facility] [place] [facility]\"" 1 time, 45 of 48 cycles  Dose modification: 180 mg/m2 (original dose 180 mg/m2, Cycle 24)  Administration: 440 mg (6/16/2021), 440 mg (6/30/2021), 440 mg (7/14/2021), 440 mg (7/28/2021), 440 mg (8/11/2021), 444 mg (8/25/2021), 440 mg (9/8/2021), 440 mg (9/22/2021), 440 mg (10/6/2021), 440 mg (10/20/2021), 440 mg (11/3/2021), 440 mg (12/1/2021), 440 mg (12/15/2021), 440 mg (11/17/2021), 440 mg (12/28/2021), 440 mg (5/11/2022), 440 mg (3/9/2022), 440 mg (2/23/2022), 440 mg (2/2/2022), 440 mg (1/19/2022), 440 mg (4/20/2022), 440 mg (4/6/2022), 440 mg (3/24/2022), 440 mg (6/1/2022), 440 mg (6/15/2022), 440 mg (10/19/2022), 440 mg (10/5/2022), 440 mg (11/2/2022),  440 mg (11/16/2022), 440 mg (11/30/2022), 440 mg (1/4/2023), 440 mg (12/19/2022), 440 mg (1/18/2023), 440 mg (5/22/2023), 440 mg (4/26/2023), 440 mg (4/11/2023), 440 mg (3/28/2023), 440 mg (3/14/2023), 440 mg (2/28/2023), 440 mg (2/14/2023), 440 mg (2/1/2023), 440 mg (7/5/2023), 440 mg (6/19/2023), 440 mg (6/5/2023), 440 mg (7/31/2023)  bevacizumab-awwb (MVASI) 5 mg/kg = 680 mg in sodium chloride 0.9% 100 mL infusion, 5 mg/kg = 680 mg (100 % of original dose 5 mg/kg), Intravenous, Clinic/[facility] 1 time, 7 of 10 cycles  Dose modification: 5 mg/kg (original dose 5 mg/kg, Cycle 39)  Administration: 680 mg (4/11/2023), 680 mg (4/26/2023), 680 mg (5/22/2023), 680 mg (7/5/2023), 680 mg (6/19/2023), 680 mg (6/5/2023), 680 mg (7/31/2023)     8/17/2023 - 8/18/2023 Chemotherapy    Treatment Summary   Plan Name: OP CETUXIMAB 500MG Q2W  Treatment Goal: Control  Status: Inactive  Start Date:   End Date:   Provider: Marah Santo MD  Chemotherapy: [No matching medication found in this treatment plan]     8/22/2023 -  Chemotherapy    Treatment Summary   Plan Name: [facility] - ARM 1 ENCORAFENIB  CETUXIMAB NIVOLUMAB  Treatment Goal: Palliative  Status: Active  Start Date: 8/22/2023 (Planned)  End Date: 7/9/2024 (Planned)  Provider: Marah Santo MD  Chemotherapy: cetuximab (ERBITUX) 100 mg/50 mL chemo infusion 1,230 mg, 500 mg/m2 = 1,230 mg, Intravenous, Clinic/[facility] 1 time, 0 of 12 cycles  encorafenib 75 mg Cap, 300 mg, Oral, Daily, 0 of 1 cycle, Start date: --, End date: --         History:  Past Medical History:   Diagnosis Date    Anxiety     Depression     FH: ovarian cancer 3/16/2020    Hx of psychiatric care     Effexor, Paxil, Lexapro, Zoloft, Wellbutrin, Trazodone Buspar    Hyperthyroidism     Hypothyroid     Kidney calculi     Malignant neoplasm of sigmoid colon 3/16/2020    Menorrhagia     Multinodular goiter 8/24/2012    Palpitation     Psychiatric problem     Venous insufficiency       Past Surgical History:   Procedure  Laterality Date     SECTION, CLASSIC      x3    COLONOSCOPY N/A 2020    Procedure: COLONOSCOPY;  Surgeon: Shane Parker MD;  Location: [facility] ENDO;  Service: Endoscopy;  Laterality: N/A;    COLONOSCOPY N/A 2022    Procedure: COLONOSCOPY;  Surgeon: Shane Parker MD;  Location: [facility] ENDO;  Service: Endoscopy;  Laterality: N/A;    COLONOSCOPY N/A 2023    Procedure: COLONOSCOPY;  Surgeon: Shane Parker MD;  Location: [facility] ENDO;  Service: Endoscopy;  Laterality: N/A;  no cecum anastomosis    CYSTOSCOPY W/ URETERAL STENT PLACEMENT Bilateral 3/25/2020    Procedure: CYSTOSCOPY, WITH URETERAL STENT INSERTION;  Surgeon: Claudio Tyson MD;  [facility];  Service: Urology;  Laterality: Bilateral;    INSERTION OF TUNNELED CENTRAL VENOUS CATHETER (CVC) WITH SUBCUTANEOUS PORT N/A 2020    Procedure: [id number];  Surgeon: Dosc Diagnostic Provider;  [facility];  Service: Radiology;  Laterality: N/A;  Room 189/Cindy    LAPAROSCOPIC SIGMOIDECTOMY N/A 3/25/2020    Procedure: COLECTOMY, SIGMOID, LAPAROSCOPIC, flex sig, ERAS high;  Surgeon: Silvio Man MD;  Location: [facility] OR [facility] FLR;  Service: Colon and Rectal;  Laterality: N/A;    MOBILIZATION OF SPLENIC FLEXURE  3/25/2020    Procedure: MOBILIZATION, SPLENIC FLEXURE;  Surgeon: Silvio Man MD;  Location: [facility] OR [facility] FLR;  Service: Colon and Rectal;;    tonsillectomy      TOTAL ABDOMINAL HYSTERECTOMY W/ BILATERAL SALPINGOOPHORECTOMY N/A 3/25/2020    Procedure: HYSTERECTOMY, TOTAL, ABDOMINAL, WITH BILATERAL SALPINGO-OOPHORECTOMY;  Surgeon: Keron Brady MD;  Location: [facility] OR 2ND FLR;  Service: Oncology;  Laterality: N/A;     Family History   Problem Relation Age of Onset    Heart disease Father     Diabetes Mother 65    Drug abuse Brother     Drug abuse Brother     Cancer Maternal Aunt         lung cancer    Cancer Maternal Grandmother         stomach cancer- started in ovaries     Ovarian cancer Maternal Grandmother         stomach cancer- started in ovaries    Colon cancer Paternal Uncle     Cancer Paternal Uncle     Ovarian cancer Maternal Aunt     Ovarian cancer Maternal Aunt     Ovarian cancer Cousin         mother had ovarian cancer    Drug abuse Son     Drug abuse Son         clean/sober since 2012    Colon cancer Son     No Known Problems Daughter     Uterine cancer Neg Hx     Breast cancer Neg Hx      Social History     Tobacco Use    Smoking status: Never    Smokeless tobacco: Never   Substance and Sexual Activity    Alcohol use: Yes     Comment: occasionally- twice monthly    Drug use: Never    Sexual activity: Yes     Partners: Male     Birth control/protection: See Surgical Hx        ROS:   Review of Systems   Constitutional:  Positive for malaise/fatigue (first few days). Negative for fever and weight loss.   HENT:  Negative for congestion, hearing loss, nosebleeds and sore throat.    Eyes:  Negative for double vision and photophobia.   Respiratory:  Negative for cough, hemoptysis, sputum production, shortness of breath and wheezing.    Cardiovascular:  Negative for chest pain, palpitations, orthopnea and leg swelling.   Gastrointestinal:  Positive for constipation (intermittent) and nausea (improving). Negative for abdominal pain, blood in stool, diarrhea, heartburn and vomiting.   Genitourinary:  Negative for dysuria, frequency, hematuria and urgency.   Musculoskeletal:  Negative for back pain, joint pain and myalgias.   Skin:  Positive for rash. Negative for itching.   Neurological:  Negative for dizziness, tingling, seizures, weakness and headaches.   Endo/Heme/Allergies:  Negative for polydipsia. Does not bruise/bleed easily.   Psychiatric/Behavioral:  Negative for depression and memory loss. The patient is nervous/anxious. The patient does not have insomnia.         Objective:     Vitals:    08/21/23 1400   BP: 114/66   Pulse: 97   Resp: 16   Temp: 97.8 °F (36.6 °C)  "  TempSrc: Temporal   SpO2: 98%   Weight: 131.3 kg (289 lb 7.4 oz)   Height: 5' 6" (1.676 m)   PainSc: 0-No pain         Wt Readings from Last 10 Encounters:   08/21/23 131.3 kg (289 lb 7.4 oz)   08/14/23 130.3 kg (287 lb 4.2 oz)   07/31/23 129.5 kg (285 lb 7.9 oz)   07/31/23 129.5 kg (285 lb 7.9 oz)   07/07/23 127.8 kg (281 lb 12 oz)   07/05/23 127.8 kg (281 lb 12 oz)   06/29/23 127.8 kg (281 lb 12 oz)   06/21/23 128.9 kg (284 lb 2.8 oz)   06/19/23 128.9 kg (284 lb 2.8 oz)   06/19/23 128.9 kg (284 lb 2.8 oz)       Physical Examination:   Physical Exam  Vitals and nursing note reviewed.   Constitutional:       General: She is not in acute distress.     Appearance: She is not diaphoretic.   HENT:      Head: Normocephalic.      Mouth/Throat:      Pharynx: No oropharyngeal exudate.   Eyes:      General: No scleral icterus.     Conjunctiva/sclera: Conjunctivae normal.   Neck:      Thyroid: No thyromegaly.   Cardiovascular:      Rate and Rhythm: Normal rate and regular rhythm.      Heart sounds: Normal heart sounds. No murmur heard.  Pulmonary:      Effort: Pulmonary effort is normal. No respiratory distress.      Breath sounds: No stridor. No wheezing or rales.   Chest:      Chest wall: No tenderness.   Abdominal:      General: Bowel sounds are normal. There is no distension.      Palpations: Abdomen is soft. There is no mass.      Tenderness: There is no abdominal tenderness. There is no rebound.   Musculoskeletal:         General: No tenderness or deformity. Normal range of motion.      Cervical back: Neck supple.   Lymphadenopathy:      Cervical: No cervical adenopathy.   Skin:     General: Skin is warm and dry.      Findings: Rash (grade 1 facial rash, at baseline) present. No erythema.   Neurological:      Mental Status: She is alert and oriented to person, place, and time.      Cranial Nerves: No cranial nerve deficit.      Coordination: Coordination normal.      Gait: Gait is intact.   Psychiatric:         Mood " and Affect: Affect normal.         Cognition and Memory: Memory normal.         Judgment: Judgment normal.          Diagnostic Tests:  Significant Imaging: I have reviewed and interpreted all pertinent imaging results/findings.  Laboratory Data:  All pertinent labs have been reviewed.    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 Oxaliplatin was stopped due to severe adverse reaction.     Follow-up CTs and PET in May 2021 showed enlarging retroperitoneal node, concerning for metastatic disease.      She started FOLFIRI + Avastin and has continued till July 2022.   Given isolated RP toni disease, she has undergone open Left periaortic and aortocaval lymph node dissection on 7/12/2022. Resumed FOLFIRI+ Debo with relatively stable disease but now has disease progression.     She has been enrolled into  SWOG 2107 (encorafenib/cetuximab + nivo) , cleared for cycle 1 today.   Monitor closely. Follow up per research protocol.     Grade 1 dermatitis   Noted, start topical hydrocortisone and clindamycin gel, will add oral doxycycline as needed.     Neoplasm pain  Optimize pain control. Referred to palliative care.     Hydronephrosis   Referred to Urology for co management     Nausea   Continue compazine. Continue sacuso patch as needed.      Constipation  Start sennakot and daily Miralax.     Transaminitis  Fluctuates.  Continue to monitor. No obvious liver mets.     Hypercalcemia   Mild, secondary to hyperparathyroidism.  Avoid calcium supplements.     Hypothyroidism  Multinodular goiter   Continue Levothyroxine.  Had a non diagnostic biopsy in 2012, usg findings have remained stable. Reviewed repeat thyroid US, stable, will follow up with Endocrine.     Essential HTN   Continue antihypertensives.      Anxiety   Continue to  follow up with Onc psych.    MDM includes  :    - Acute or chronic illness or injury that poses a threat to life or bodily function  - Independent review and explanation of 3+ results from unique tests  - Discussion of management and ordering 3+ unique tests  - Extensive discussion of treatment and management  - Prescription drug management  - Drug therapy requiring intensive monitoring for toxicity          ECOG SCORE    1 - Restricted in strenuous activity-ambulatory and able to carry out work of a light nature           Discussion:   No follow-ups on file.    Plan was discussed with the patient at length, and she verbalized understanding. Gail was given an opportunity to ask questions that were answered to her satisfaction, and she was advised to call in the interval if any problems or questions arise.    Electronically signed by Marah Santo MD        Route Chart for Scheduling    Med Onc Chart Routing      Follow up with physician . Per resarch protocol   Follow up with [facility]    Infusion scheduling note    Injection scheduling note    Labs    Imaging    Pharmacy appointment    Other referrals              Treatment Plan Information   [facility] - ARM 1 ENCORAFENIB  CETUXIMAB NIVOLUMAB   Marah Santo MD   Upcoming Treatment Dates - [facility] - ARM 1 ENCORAFENIB  CETUXIMAB NIVOLUMAB    8/22/2023       Pre-Medications       acetaminophen tablet 650 mg       diphenhydrAMINE capsule 50 mg       Chemotherapy       cetuximab  (ERBITUX) 100 mg/50 mL chemo infusion 1,230 mg       INV nivolumab 480 mg in INV sodium chloride 0.9 % 100 mL chemo infusion  9/5/2023       Pre-Medications       acetaminophen tablet 650 mg       diphenhydrAMINE capsule 50 mg       Chemotherapy       cetuximab (ERBITUX) 100 mg/50 mL chemo infusion 1,230 mg  9/19/2023       Pre-Medications       acetaminophen tablet 650 mg       diphenhydrAMINE capsule 50 mg       Chemotherapy       cetuximab (ERBITUX) 100 mg/50 mL chemo infusion 1,230 mg       INV nivolumab 480 mg in INV sodium chloride 0.9 % 100 mL chemo infusion  10/3/2023       Pre-Medications       acetaminophen tablet 650 mg       diphenhydrAMINE capsule 50 mg       Chemotherapy       cetuximab (ERBITUX) 100 mg/50 mL chemo infusion 1,230 mg    Supportive Plan Information  IV FLUIDS AND ELECTROLYTES   Brayden Solo MD   Upcoming Treatment Dates - IV FLUIDS AND ELECTROLYTES    No upcoming days in selected categories.    Therapy Plan Information  PORT FLUSH  Flushes  heparin, porcine (PF) 100 unit/mL injection flush 500 Units  500 Units, Intravenous, Every visit  sodium chloride 0.9% flush 10 mL  10 mL, Intravenous, Every visit      Answers submitted by the patient for this visit:  Review of Systems Questionnaire (Submitted on 8/21/2023)  appetite change : No  unexpected weight change: No  mouth sores: No  visual disturbance: No  adenopathy: No

## 2023-08-22 ENCOUNTER — TELEPHONE (OUTPATIENT)
Dept: ENDOCRINOLOGY | Facility: CLINIC | Age: 53
End: 2023-08-22
Payer: MEDICAID

## 2023-08-22 DIAGNOSIS — Z00.6 EXAMINATION OF PARTICIPANT IN CLINICAL TRIAL: Primary | ICD-10-CM

## 2023-08-22 DIAGNOSIS — Z09 CHEMOTHERAPY FOLLOW-UP EXAMINATION: Primary | ICD-10-CM

## 2023-08-22 DIAGNOSIS — Z00.6 EXAMINATION OF PARTICIPANT IN CLINICAL TRIAL: ICD-10-CM

## 2023-08-22 DIAGNOSIS — E83.52 HYPERCALCEMIA: Primary | ICD-10-CM

## 2023-08-22 DIAGNOSIS — E03.8 OTHER SPECIFIED HYPOTHYROIDISM: ICD-10-CM

## 2023-08-22 DIAGNOSIS — C18.7 MALIGNANT NEOPLASM OF SIGMOID COLON: ICD-10-CM

## 2023-08-22 NOTE — TELEPHONE ENCOUNTER
Let patient know the oncology team sent me her labs.   Increase synthroid to 112 mcg tablets- take 2 tablets daily for a total of 224 mcg  Check TSH 6 weeks    Also with repeat labs will need Renal Panel, PTH, Vit D and 24 hr urine Ca/Cr

## 2023-08-23 NOTE — PROGRESS NOTES
PATIENT: Gail Mckinnon  MRN: [id number]  DATE: 8/24/2023      Diagnosis:   1. Malignant neoplasm of sigmoid colon    2. 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    Subjective:    Interval History: Ms. Mckinnon is a 53 y.o. female who is seen today for education and discussion prior to starting treatment with Encorafenib, Cetuximab, Nivolumab per the SWOG 2017 clinical trial for a diagnosis of metastatic colon cancer.     She has prescriptions for Ondansetron, Phenergan, Compazine at home to use PRN for N/V. She also uses the Sancuso patch.   Clindamycin gel has been sent to her pharmacy previously by Dr. Santo.   Plan is to start treatment today.   No other new complaints or pertinent findings on a 10-point review of systems.     Oncology History   Malignant neoplasm of sigmoid colon   3/16/2020 Initial Diagnosis    Malignant neoplasm of sigmoid colon     3/31/2020 Cancer Staged    Staging form: Colon and Rectum, AJCC 8th Edition  - Clinical stage from 3/31/2020: Stage IIIC (cT4b, cN2a, cM0)     5/6/2020 - 11/17/2020 Chemotherapy    Treatment Summary   Plan Name: OP FOLFOX 6 Q2W  Treatment Goal: Curative  Status: Inactive  Start Date: 5/6/2020  End Date: 11/6/2020  Provider: Dylan Leyva MD  Chemotherapy: fluorouraciL injection 945 mg, 400 mg/m2 = 945 mg, Intravenous, Clinic/HOD 1 time, 14 of 14 cycles  Administration: 945 mg (5/6/2020), 945 mg (5/20/2020), 945 mg (6/3/2020), 945 mg (6/17/2020), 945 mg (7/29/2020), 945 mg (8/12/2020), 945 mg (8/26/2020), 945 mg (9/9/2020), 945 mg (9/23/2020), 945 mg (10/6/2020), 945 mg (10/21/2020), 945 mg (11/4/2020)  fluorouraciL 2,400 mg/m2 = 5,665 mg in sodium chloride 0.9% 240 mL chemo infusion, 2,400 mg/m2 = 5,665 mg, Intravenous, Over 46 hours, 14 of 14 cycles  Administration: 5,665 mg (5/6/2020), 5,665 mg (5/20/2020), 5,665 mg (6/3/2020), 5,665 mg (6/17/2020), 5,665 mg (7/29/2020), 5,665 mg (8/12/2020), 5,665 mg  (8/26/2020), 5,665 mg (9/9/2020), 5,665 mg (9/23/2020), 5,665 mg (10/6/2020), 5,665 mg (10/21/2020), 5,665 mg (11/4/2020)  leucovorin calcium 900 mg in dextrose 5 % 250 mL infusion, 945 mg, Intravenous, Clinic/HOD 1 time, 14 of 14 cycles  Administration: 900 mg (5/6/2020), 900 mg (5/20/2020), 900 mg (6/3/2020), 900 mg (6/17/2020), 945 mg (6/30/2020), 945 mg (7/20/2020), 945 mg (7/29/2020), 945 mg (8/12/2020), 945 mg (8/26/2020), 945 mg (9/9/2020), 945 mg (9/23/2020), 945 mg (10/6/2020), 945 mg (10/21/2020), 945 mg (11/4/2020)  oxaliplatin (ELOXATIN) 200 mg in dextrose 5 % 500 mL chemo infusion, 201 mg, Intravenous, Clinic/HOD 1 time, 6 of 6 cycles  Dose modification: 65 mg/m2 (original dose 85 mg/m2, Cycle 6)  Administration: 200 mg (5/6/2020), 200 mg (5/20/2020), 200 mg (6/3/2020), 200 mg (6/17/2020), 201 mg (6/30/2020), 150 mg (7/20/2020)     5/3/2021 Cancer Staged    Staging form: Colon and Rectum, AJCC 8th Edition  - Clinical stage from 5/3/2021: Stage Unknown (rcT0, cNX, cM1)     6/16/2021 - 8/2/2023 Chemotherapy    Treatment Summary   Plan Name: OP COLORECTAL FOLFIRI + BEVACIZUMAB Q2W  Treatment Goal: Control  Status: Inactive  Start Date: 6/16/2021  End Date: 8/2/2023  Provider: Marah Santo MD  Chemotherapy: fluorouraciL injection 990 mg, 400 mg/m2 = 990 mg, Intravenous, Clinic/HOD 1 time, 15 of 15 cycles  Administration: 990 mg (6/16/2021), 990 mg (6/30/2021), 990 mg (7/14/2021), 980 mg (7/28/2021), 990 mg (8/11/2021), 990 mg (8/25/2021), 990 mg (9/8/2021), 990 mg (9/22/2021), 990 mg (10/6/2021), 990 mg (10/20/2021), 990 mg (11/3/2021), 990 mg (12/1/2021), 990 mg (12/15/2021), 990 mg (11/17/2021), 990 mg (12/28/2021)  fluorouraciL 2,400 mg/m2 = 5,950 mg in sodium chloride 0.9% 240 mL chemo infusion, 2,400 mg/m2 = 5,950 mg, Intravenous, Over 46 hours, 43 of 46 cycles  Administration: 5,950 mg (6/16/2021), 5,950 mg (6/30/2021), 5,950 mg (7/14/2021), 5,880 mg (7/28/2021), 5,930 mg (8/11/2021), 5,930 mg  (8/25/2021), 5,930 mg (9/8/2021), 5,930 mg (9/22/2021), 5,930 mg (10/6/2021), 5,930 mg (10/20/2021), 5,930 mg (11/3/2021), 5,930 mg (12/1/2021), 5,930 mg (12/15/2021), 5,930 mg (11/17/2021), 5,930 mg (12/28/2021), 6,050 mg (5/11/2022), 6,025 mg (3/9/2022), 6,025 mg (2/23/2022), 5,930 mg (2/2/2022), 5,930 mg (1/19/2022), 6,050 mg (4/20/2022), 6,025 mg (4/6/2022), 6,025 mg (3/23/2022), 6,050 mg (6/1/2022), 6,050 mg (6/15/2022), 6,050 mg (10/19/2022), 6,050 mg (10/5/2022), 6,050 mg (11/2/2022), 6,050 mg (11/16/2022), 6,050 mg (11/30/2022), 6,050 mg (1/4/2023), 6,050 mg (12/19/2022), 6,050 mg (1/18/2023), 6,000 mg (5/22/2023), 6,000 mg (4/26/2023), 6,000 mg (4/11/2023), 6,000 mg (2/28/2023), 6,050 mg (2/14/2023), 6,000 mg (2/1/2023), 6,000 mg (7/5/2023), 6,000 mg (6/19/2023), 6,000 mg (6/5/2023), 6,000 mg (7/31/2023)  bevacizumab (AVASTIN) 600 mg in sodium chloride 0.9% 100 mL chemo infusion, 660 mg, Intravenous, Clinic/HOD 1 time, 36 of 36 cycles  Dose modification: 5 mg/kg (original dose 5 mg/kg, Cycle 26, Reason: MD Discretion, Comment: 5 mg/kg original dose)  Administration: 600 mg (6/30/2021), 600 mg (7/14/2021), 600 mg (7/28/2021), 600 mg (6/16/2021), 600 mg (8/11/2021), 655 mg (8/25/2021), 600 mg (9/8/2021), 600 mg (9/22/2021), 600 mg (10/6/2021), 600 mg (10/20/2021), 600 mg (11/3/2021), 655 mg (12/1/2021), 600 mg (12/15/2021), 600 mg (11/17/2021), 600 mg (12/28/2021), 600 mg (5/11/2022), 600 mg (3/9/2022), 600 mg (2/23/2022), 600 mg (2/2/2022), 600 mg (1/19/2022), 600 mg (4/20/2022), 680 mg (4/6/2022), 600 mg (3/23/2022), 680 mg (10/5/2022), 680 mg (10/19/2022), 680 mg (11/2/2022), 680 mg (11/16/2022), 680 mg (11/30/2022), 680 mg (1/4/2023), 680 mg (12/19/2022), 680 mg (1/18/2023), 680 mg (3/28/2023), 680 mg (3/14/2023), 680 mg (2/28/2023), 680 mg (2/14/2023), 680 mg (2/1/2023)  irinotecan (CAMPTOSAR) 440 mg in sodium chloride 0.9% 500 mL chemo infusion, 446 mg, Intravenous, Clinic/HOD 1 time, 45 of 48  cycles  Dose modification: 180 mg/m2 (original dose 180 mg/m2, Cycle 24)  Administration: 440 mg (6/16/2021), 440 mg (6/30/2021), 440 mg (7/14/2021), 440 mg (7/28/2021), 440 mg (8/11/2021), 444 mg (8/25/2021), 440 mg (9/8/2021), 440 mg (9/22/2021), 440 mg (10/6/2021), 440 mg (10/20/2021), 440 mg (11/3/2021), 440 mg (12/1/2021), 440 mg (12/15/2021), 440 mg (11/17/2021), 440 mg (12/28/2021), 440 mg (5/11/2022), 440 mg (3/9/2022), 440 mg (2/23/2022), 440 mg (2/2/2022), 440 mg (1/19/2022), 440 mg (4/20/2022), 440 mg (4/6/2022), 440 mg (3/24/2022), 440 mg (6/1/2022), 440 mg (6/15/2022), 440 mg (10/19/2022), 440 mg (10/5/2022), 440 mg (11/2/2022), 440 mg (11/16/2022), 440 mg (11/30/2022), 440 mg (1/4/2023), 440 mg (12/19/2022), 440 mg (1/18/2023), 440 mg (5/22/2023), 440 mg (4/26/2023), 440 mg (4/11/2023), 440 mg (3/28/2023), 440 mg (3/14/2023), 440 mg (2/28/2023), 440 mg (2/14/2023), 440 mg (2/1/2023), 440 mg (7/5/2023), 440 mg (6/19/2023), 440 mg (6/5/2023), 440 mg (7/31/2023)  bevacizumab-awwb (MVASI) 5 mg/kg = 680 mg in sodium chloride 0.9% 100 mL infusion, 5 mg/kg = 680 mg (100 % of original dose 5 mg/kg), Intravenous, [facility] [place] [facility] time, 7 of 10 cycles  Dose modification: 5 mg/kg (original dose 5 mg/kg, Cycle 39)  Administration: 680 mg (4/11/2023), 680 mg (4/26/2023), 680 mg (5/22/2023), 680 mg (7/5/2023), 680 mg (6/19/2023), 680 mg (6/5/2023), 680 mg (7/31/2023)     8/17/2023 - 8/18/2023 Chemotherapy    Treatment Summary   Plan Name: OP CETUXIMAB 500MG Q2W  Treatment Goal: Control  Status: Inactive  Start Date:   End Date:   Provider: Marah Santo MD  Chemotherapy: [No matching medication found in this treatment plan]     8/24/2023 -  Chemotherapy    Treatment Summary   Plan Name: [facility] - ARM 1 ENCORAFENIB  CETUXIMAB NIVOLUMAB  Treatment Goal: Palliative  Status: Active  Start Date: 8/24/2023 (Planned)  End Date: 7/11/2024 (Planned)  Provider: Marah Santo MD  Chemotherapy: cetuximab (ERBITUX) 100 mg/50  mL chemo infusion 1,230 mg, 500 mg/m2 = 1,230 mg, Intravenous, Clinic/HOD 1 time, 0 of 12 cycles  encorafenib 75 mg Cap, 300 mg, Oral, Daily, 0 of 1 cycle, Start date: --, End date: --     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   4/3/2020 Initial Diagnosis    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     5/6/2020 - 11/17/2020 Chemotherapy    Treatment Summary   Plan Name: OP FOLFOX 6 Q2W  Treatment Goal: Curative  Status: Inactive  Start Date: 5/6/2020  End Date: 11/6/2020  Provider: Dylan Leyva MD  Chemotherapy: fluorouraciL injection 945 mg, 400 mg/m2 = 945 mg, Intravenous, Clinic/HOD 1 time, 14 of 14 cycles  Administration: 945 mg (5/6/2020), 945 mg (5/20/2020), 945 mg (6/3/2020), 945 mg (6/17/2020), 945 mg (7/29/2020), 945 mg (8/12/2020), 945 mg (8/26/2020), 945 mg (9/9/2020), 945 mg (9/23/2020), 945 mg (10/6/2020), 945 mg (10/21/2020), 945 mg (11/4/2020)  fluorouraciL 2,400 mg/m2 = 5,665 mg in sodium chloride 0.9% 240 mL chemo infusion, 2,400 mg/m2 = 5,665 mg, Intravenous, Over 46 hours, 14 of 14 cycles  Administration: 5,665 mg (5/6/2020), 5,665 mg (5/20/2020), 5,665 mg (6/3/2020), 5,665 mg (6/17/2020), 5,665 mg (7/29/2020), 5,665 mg (8/12/2020), 5,665 mg (8/26/2020), 5,665 mg (9/9/2020), 5,665 mg (9/23/2020), 5,665 mg (10/6/2020), 5,665 mg (10/21/2020), 5,665 mg (11/4/2020)  leucovorin calcium 900 mg in dextrose 5 % 250 mL infusion, 945 mg, Intravenous, Clinic/HOD 1 time, 14 of 14 cycles  Administration: 900 mg (5/6/2020), 900 mg (5/20/2020), 900 mg (6/3/2020), 900 mg (6/17/2020), 945 mg (6/30/2020), 945 mg (7/20/2020), 945 mg (7/29/2020), 945 mg (8/12/2020), 945 mg (8/26/2020), 945 mg (9/9/2020), 945 mg (9/23/2020), 945 mg (10/6/2020), 945 mg (10/21/2020), 945 mg (11/4/2020)  oxaliplatin (ELOXATIN) 200 mg in dextrose 5 % 500 mL chemo infusion, 201 mg, Intravenous, Clinic/HOD 1 time, 6 of 6 cycles  Dose modification: 65 mg/m2 (original dose 85 mg/m2, Cycle 6)  Administration: 200 mg (5/6/2020), 200 mg  (5/20/2020), 200 mg (6/3/2020), 200 mg (6/17/2020), 201 mg (6/30/2020), 150 mg (7/20/2020)     6/16/2021 - 8/2/2023 Chemotherapy    Treatment Summary   Plan Name: OP COLORECTAL FOLFIRI + BEVACIZUMAB Q2W  Treatment Goal: Control  Status: Inactive  Start Date: 6/16/2021  End Date: 8/2/2023  Provider: Marah Santo MD  Chemotherapy: fluorouraciL injection 990 mg, 400 mg/m2 = 990 mg, Intravenous, [facility] [place] 1 time, 15 of 15 cycles  Administration: 990 mg (6/16/2021), 990 mg (6/30/2021), 990 mg (7/14/2021), 980 mg (7/28/2021), 990 mg (8/11/2021), 990 mg (8/25/2021), 990 mg (9/8/2021), 990 mg (9/22/2021), 990 mg (10/6/2021), 990 mg (10/20/2021), 990 mg (11/3/2021), 990 mg (12/1/2021), 990 mg (12/15/2021), 990 mg (11/17/2021), 990 mg (12/28/2021)  fluorouraciL 2,400 mg/m2 = 5,950 mg in sodium chloride 0.9% 240 mL chemo infusion, 2,400 mg/m2 = 5,950 mg, Intravenous, Over 46 hours, 43 of 46 cycles  Administration: 5,950 mg (6/16/2021), 5,950 mg (6/30/2021), 5,950 mg (7/14/2021), 5,880 mg (7/28/2021), 5,930 mg (8/11/2021), 5,930 mg (8/25/2021), 5,930 mg (9/8/2021), 5,930 mg (9/22/2021), 5,930 mg (10/6/2021), 5,930 mg (10/20/2021), 5,930 mg (11/3/2021), 5,930 mg (12/1/2021), 5,930 mg (12/15/2021), 5,930 mg (11/17/2021), 5,930 mg (12/28/2021), 6,050 mg (5/11/2022), 6,025 mg (3/9/2022), 6,025 mg (2/23/2022), 5,930 mg (2/2/2022), 5,930 mg (1/19/2022), 6,050 mg (4/20/2022), 6,025 mg (4/6/2022), 6,025 mg (3/23/2022), 6,050 mg (6/1/2022), 6,050 mg (6/15/2022), 6,050 mg (10/19/2022), 6,050 mg (10/5/2022), 6,050 mg (11/2/2022), 6,050 mg (11/16/2022), 6,050 mg (11/30/2022), 6,050 mg (1/4/2023), 6,050 mg (12/19/2022), 6,050 mg (1/18/2023), 6,000 mg (5/22/2023), 6,000 mg (4/26/2023), 6,000 mg (4/11/2023), 6,000 mg (2/28/2023), 6,050 mg (2/14/2023), 6,000 mg (2/1/2023), 6,000 mg (7/5/2023), 6,000 mg (6/19/2023), 6,000 mg (6/5/2023), 6,000 mg (7/31/2023)  bevacizumab (AVASTIN) 600 mg in sodium chloride 0.9% 100 mL chemo infusion, 660  mg, Intravenous, Clinic/HOD 1 time, 36 of 36 cycles  Dose modification: 5 mg/kg (original dose 5 mg/kg, Cycle 26, Reason: MD Discretion, Comment: 5 mg/kg original dose)  Administration: 600 mg (6/30/2021), 600 mg (7/14/2021), 600 mg (7/28/2021), 600 mg (6/16/2021), 600 mg (8/11/2021), 655 mg (8/25/2021), 600 mg (9/8/2021), 600 mg (9/22/2021), 600 mg (10/6/2021), 600 mg (10/20/2021), 600 mg (11/3/2021), 655 mg (12/1/2021), 600 mg (12/15/2021), 600 mg (11/17/2021), 600 mg (12/28/2021), 600 mg (5/11/2022), 600 mg (3/9/2022), 600 mg (2/23/2022), 600 mg (2/2/2022), 600 mg (1/19/2022), 600 mg (4/20/2022), 680 mg (4/6/2022), 600 mg (3/23/2022), 680 mg (10/5/2022), 680 mg (10/19/2022), 680 mg (11/2/2022), 680 mg (11/16/2022), 680 mg (11/30/2022), 680 mg (1/4/2023), 680 mg (12/19/2022), 680 mg (1/18/2023), 680 mg (3/28/2023), 680 mg (3/14/2023), 680 mg (2/28/2023), 680 mg (2/14/2023), 680 mg (2/1/2023)  irinotecan (CAMPTOSAR) 440 mg in sodium chloride 0.9% 500 mL chemo infusion, 446 mg, Intravenous, Clinic/HOD 1 time, 45 of 48 cycles  Dose modification: 180 mg/m2 (original dose 180 mg/m2, Cycle 24)  Administration: 440 mg (6/16/2021), 440 mg (6/30/2021), 440 mg (7/14/2021), 440 mg (7/28/2021), 440 mg (8/11/2021), 444 mg (8/25/2021), 440 mg (9/8/2021), 440 mg (9/22/2021), 440 mg (10/6/2021), 440 mg (10/20/2021), 440 mg (11/3/2021), 440 mg (12/1/2021), 440 mg (12/15/2021), 440 mg (11/17/2021), 440 mg (12/28/2021), 440 mg (5/11/2022), 440 mg (3/9/2022), 440 mg (2/23/2022), 440 mg (2/2/2022), 440 mg (1/19/2022), 440 mg (4/20/2022), 440 mg (4/6/2022), 440 mg (3/24/2022), 440 mg (6/1/2022), 440 mg (6/15/2022), 440 mg (10/19/2022), 440 mg (10/5/2022), 440 mg (11/2/2022), 440 mg (11/16/2022), 440 mg (11/30/2022), 440 mg (1/4/2023), 440 mg (12/19/2022), 440 mg (1/18/2023), 440 mg (5/22/2023), 440 mg (4/26/2023), 440 mg (4/11/2023), 440 mg (3/28/2023), 440 mg (3/14/2023), 440 mg (2/28/2023), 440 mg (2/14/2023), 440 mg (2/1/2023), 440  mg (2023), 440 mg (2023), 440 mg (2023), 440 mg (2023)  bevacizumab-awwb (MVASI) 5 mg/kg = 680 mg in sodium chloride 0.9% 100 mL infusion, 5 mg/kg = 680 mg (100 % of original dose 5 mg/kg), Intravenous, Clinic/HOD 1 time, 7 of 10 cycles  Dose modification: 5 mg/kg (original dose 5 mg/kg, Cycle 39)  Administration: 680 mg (2023), 680 mg (2023), 680 mg (2023), 680 mg (2023), 680 mg (2023), 680 mg (2023), 680 mg (2023)     2023 - 2023 Chemotherapy    Treatment Summary   Plan Name: OP CETUXIMAB 500MG Q2W  Treatment Goal: Control  Status: Inactive  Start Date:   End Date:   Provider: Marah Santo MD  Chemotherapy: [No matching medication found in this treatment plan]     2023 -  Chemotherapy    Treatment Summary   Plan Name: [facility] - ARM 1 ENCORAFENIB  CETUXIMAB NIVOLUMAB  Treatment Goal: Palliative  Status: Active  Start Date: 2023 (Planned)  End Date: 2024 (Planned)  Provider: Marah Santo MD  Chemotherapy: cetuximab (ERBITUX) 100 mg/50 mL chemo infusion 1,230 mg, 500 mg/m2 = 1,230 mg, Intravenous, Clinic/HOD 1 time, 0 of 12 cycles  encorafenib 75 mg Cap, 300 mg, Oral, Daily, 0 of 1 cycle, Start date: --, End date: --        Past Medical History:   Past Medical History:   Diagnosis Date    Anxiety     Depression     FH: ovarian cancer 3/16/2020    Hx of psychiatric care     Effexor, Paxil, Lexapro, Zoloft, Wellbutrin, Trazodone Buspar    Hyperthyroidism     Hypothyroid     Kidney calculi     Malignant neoplasm of sigmoid colon 3/16/2020    Menorrhagia     Multinodular goiter 2012    Palpitation     Psychiatric problem     Venous insufficiency        Past Surgical HIstory:   Past Surgical History:   Procedure Laterality Date     SECTION, CLASSIC      x3    COLONOSCOPY N/A 2020    Procedure: COLONOSCOPY;  Surgeon: Shane Parker MD;  Location: [facility];  Service: Endoscopy;  Laterality: N/A;    COLONOSCOPY N/A  6/21/2022    Procedure: COLONOSCOPY;  Surgeon: Shane Parker MD;  Location: [facility] ENDO;  Service: Endoscopy;  Laterality: N/A;    COLONOSCOPY N/A 5/9/2023    Procedure: COLONOSCOPY;  Surgeon: Shane Parker MD;  Location: [facility] ENDO;  Service: Endoscopy;  Laterality: N/A;  no cecum anastomosis    CYSTOSCOPY W/ URETERAL STENT PLACEMENT Bilateral 3/25/2020    Procedure: CYSTOSCOPY, WITH URETERAL STENT INSERTION;  Surgeon: Claudio Tyson MD;  [facility];  Service: Urology;  Laterality: Bilateral;    INSERTION OF TUNNELED CENTRAL VENOUS CATHETER (CVC) WITH SUBCUTANEOUS PORT N/A 5/1/2020    Procedure: [id number]-CATH;  Surgeon: Dosc Diagnostic Provider;  [facility];  Service: Radiology;  Laterality: N/A;  Room 189/[facility]    LAPAROSCOPIC SIGMOIDECTOMY N/A 3/25/2020    Procedure: COLECTOMY, SIGMOID, LAPAROSCOPIC, flex sig, ERAS high;  Surgeon: Silvio Man MD;  [facility];  Service: Colon and Rectal;  Laterality: N/A;    MOBILIZATION OF SPLENIC FLEXURE  3/25/2020    Procedure: MOBILIZATION, SPLENIC FLEXURE;  Surgeon: Silvio Man MD;  [facility];  Service: Colon and Rectal;;    tonsillectomy      TOTAL ABDOMINAL HYSTERECTOMY W/ BILATERAL SALPINGOOPHORECTOMY N/A 3/25/2020    Procedure: HYSTERECTOMY, TOTAL, ABDOMINAL, WITH BILATERAL SALPINGO-OOPHORECTOMY;  Surgeon: Keron Brady MD;  [facility];  Service: Oncology;  Laterality: N/A;       Family History:   Family History   Problem Relation Age of Onset    Heart disease Father     Diabetes Mother 65    Drug abuse Brother     Drug abuse Brother     Cancer Maternal Aunt         lung cancer    Cancer Maternal Grandmother         stomach cancer- started in ovaries    Ovarian cancer Maternal Grandmother         stomach cancer- started in ovaries    Colon cancer Paternal Uncle     Cancer Paternal Uncle     Ovarian cancer Maternal Aunt     Ovarian cancer Maternal Aunt      Ovarian cancer Cousin         mother had ovarian cancer    Drug abuse Son     Drug abuse Son         clean/sober since 2012    Colon cancer Son     No Known Problems Daughter     Uterine cancer Neg Hx     Breast cancer Neg Hx        Social History:  reports that she has never smoked. She has never used smokeless tobacco. She reports current alcohol use. She reports that she does not use drugs.    Allergies:  Review of patient's allergies indicates:   Allergen Reactions    Oxaliplatin Other (See Comments)     Itchy hands, throat closed up, trouble hearing - during infusion    Penicillins Hives and Rash     childhood allergy  childhood allergy  unknown       Medications:  Current Outpatient Medications   Medication Sig Dispense Refill    acetaminophen (TYLENOL) 500 MG tablet Take 2 tablets (1,000 mg total) by mouth every 8 (eight) hours. 60 tablet 0    buPROPion (WELLBUTRIN SR) 150 MG TBSR 12 hr tablet Take 1 tablet (150 mg total) by mouth 2 (two) times daily. (Patient taking differently: Take 150 mg by mouth once daily.) 180 tablet 0    clindamycin phosphate 1% (CLINDAGEL) 1 % gel Apply topically 2 (two) times daily. 60 g 3    encorafenib 75 mg Cap Take 4 tablets (300 mg total) by mouth once daily. 120 capsule 0    FLUoxetine (PROZAC) 20 MG capsule Take 1 capsule (20 mg total) by mouth once daily. 30 capsule 0    granisetron (SANCUSO) 3.1 mg/24 hour Place 1 patch (3.1 mg total) onto the skin every 7 days AS DIRECTED. 4 patch 3    Lactobacillus rhamnosus GG (CULTURELLE) 10 billion cell capsule Take 1 capsule by mouth once daily.      levothyroxine (SYNTHROID) 200 MCG tablet Take 1 tablet (200 mcg total) by mouth once daily. 90 tablet 1    LIDOcaine-prilocaine (EMLA) cream Apply topically as needed (30 to 60 min prior chemo or port use). 30 g 3    LORazepam (ATIVAN) 1 MG tablet Take 1 tablet (1 mg total) by mouth every 12 (twelve) hours as needed for Anxiety (nausea). 30 tablet 0    multivitamin (THERAGRAN) per tablet  Take 1 tablet by mouth once daily.      olmesartan (BENICAR) 20 MG tablet Take 1 tablet (20 mg total) by mouth once daily. 30 tablet 5    ondansetron (ZOFRAN-ODT) 8 MG TbDL Take 1 tablet (8 mg total) by mouth every 8 (eight) hours as needed. 60 tablet 3    oxyCODONE-acetaminophen (PERCOCET)  mg per tablet Take 1 tablet by mouth every 6 (six) hours as needed for Pain. 60 tablet 0    prochlorperazine (COMPAZINE) 10 MG tablet Take 1 tablet (10 mg total) by mouth every 6 (six) hours as needed. 30 tablet 6    senna-docusate 8.6-50 mg (PERICOLACE) 8.6-50 mg per tablet Take 2 tablets by mouth 2 (two) times daily.      traZODone (DESYREL) 50 MG tablet Take 1 tablet (50 mg total) by mouth nightly. 30 tablet 2    magic mouthwash diphen/antac/lidoc/nysta Swish and swallow 5 mL every 4 hours as needed for mouth pain/ulcers (Patient not taking: Reported on 8/21/2023) 120 mL 2     No current facility-administered medications for this visit.       Review of Systems   Constitutional:  Negative for appetite change and unexpected weight change.   HENT:  Negative for mouth sores.    Eyes:  Negative for visual disturbance.   Respiratory:  Negative for cough and shortness of breath.    Cardiovascular:  Negative for chest pain.   Gastrointestinal:  Positive for abdominal pain. Negative for diarrhea.   Genitourinary:  Negative for frequency.   Musculoskeletal:  Positive for back pain.   Skin:  Negative for rash.   Neurological:  Negative for headaches.   Hematological:  Negative for adenopathy.   Psychiatric/Behavioral:  The patient is nervous/anxious.        ECOG Performance Status:   ECOG SCORE    0 - Fully active-able to carry on all pre-disease performance without restriction         Objective:      Vitals:   Vitals:    08/24/23 0836   BP: 129/85   BP Location: Left arm   Patient Position: Sitting   BP Method: Large (Automatic)   Pulse: 84   Resp: 16   Temp: 96.5 °F (35.8 °C)   TempSrc: Temporal   SpO2: 98%   Weight: 132.1 kg  "(291 lb 3.6 oz)   Height: 5' 6" (1.676 m)     BMI: Body mass index is 47.01 kg/m².    Physical Exam  Vitals reviewed.   Constitutional:       General: She is not in acute distress.     Appearance: She is not diaphoretic.   Eyes:      General: No scleral icterus.  Pulmonary:      Effort: Pulmonary effort is normal. No respiratory distress.   Skin:     Coloration: Skin is not jaundiced.   Neurological:      Mental Status: She is alert and oriented to person, place, and time.   Psychiatric:         Mood and Affect: Mood normal.         Behavior: Behavior normal.         Laboratory Data:  Lab Results   Component Value Date    WBC 5.54 08/24/2023    HGB 13.8 08/24/2023    HCT 43.8 08/24/2023    MCV 95 08/24/2023     08/24/2023          Imaging:   EXAMINATION:  CT CHEST ABDOMEN PELVIS WITH CONTRAST (XPD)     CLINICAL HISTORY:  Metastatic disease evaluation; Malignant neoplasm of sigmoid colon     TECHNIQUE:  Low dose axial images, sagittal and coronal reformations were obtained from the thoracic inlet to the pubic symphysis following the IV administration of 100 mL of Omnipaque 350 and the oral administration of 1000 ml of Omnipaque 1.     COMPARISON:  CT chest, abdomen, and pelvis-02/10/2023     FINDINGS:  Chest:     There is a right chest chemotherapy port present with the tip of its catheter noted in the SVC.  The soft tissue structures at the base of the neck are unremarkable.  There is trace pericardial fluid present.  No pathologically enlarged lymph nodes in the chest or axilla.  Trachea and mainstem bronchi are unremarkable.  There is a small hiatal hernia present.     There are a few scattered solid pulmonary nodules measuring no more than 5 mm in maximal dimension.  One index perifissural solid nodule in the right upper lobe abutting the interlobar fissure measures 4 mm on series 6, image 263, similar in size to the previous study.  One index nodule in the left upper lobe measures 2 mm on series 6, image " 113.  There is evidence of mild chronic obstructive airways disease at the lung apices.  No airspace consolidation.  No pleural fluid or pneumothorax.     Abdomen and pelvis:     There is hepatomegaly and hepatic steatosis.  No enhancing hepatic mass.  The portal vein is patent.  No biliary dilatation.  There are multiple calcified gallstones present within the gallbladder lumen.  No gallbladder inflammatory change.  There is a 12 x 16 mm unchanged rebecca hepatis lymph node on series 3, image 61.  There are additional prominent lymph nodes in the rebecca hepatis with one index portacaval lymph node measuring 14 x 36 mm, unchanged (series 3, image 67).  The spleen is unremarkable.  There is a suggestion of possible gastric fold thickening within the proximal stomach, possibly due to nondistention.  The duodenal C-loop shows no significant abnormalities.  No enhancing pancreatic mass or pancreatic ductal dilatation.  The portal vein is patent.  The splenic vein is patent.  The SMV is patent.     The abdominal aorta is not aneurysmal.  There is worsening celiac axis lymphadenopathy with 1 index celiac lymph node measuring 18 x 34 mm on series 3, image 59 (previously 10 x 30 mm when measured retrospectively).  There is worsening mesenteric lymphadenopathy present at the mesenteric root caudal to the pancreas and 2nd portion of the duodenum.  One index node measures 16 x 21 mm abutting the anterior margin of the 3rd portion of the duodenum on series 3, image 96.  One newly enlarged index mesenteric lymph node within the small bowel mesentery of the central abdomen measures 16 x 23 mm on series 3, image 109 (previously approximately 8 x 14 mm).  The patient's previously measured index left retroperitoneal lymph node is stable in size measuring 18 x 23 mm on series 3, image 111.     There is bilateral non-obstructing nephrolithiasis present with no interval change in stone burden as compared to the previous study.  The  largest stone in the right kidney measures 7.5 mm in the lower pole.  The largest stone in the left kidney measures 5 mm in the lower pole.  No right hydronephrosis.  There is moderate left hydronephrosis with mild prominence of the proximal left ureter to the level of the aforementioned index left retroperitoneal lymph node situated near the aortic bifurcation, possibly due to retroperitoneal fibrosis/fibrotic scarring.  There is bilateral common iliac lymphadenopathy, slightly more pronounced adjacent to the left common and external iliac arteries as compared to the previous study.  One newly enlarged index left external iliac artery lymph node measures 23 x 15 mm on series 3 image 152.  The urinary bladder is unremarkable.  The uterus is surgically absent.  The ovaries are not visualized and may be surgically absent.     The appendix is unremarkable.  There is a surgical suture line present at the rectosigmoid junction compatible with previous partial sigmoidectomy..  No new peritoneal soft tissue nodule.  There are fat containing midline incisional hernias present, similar to the prior study.  There is protrusion of the transverse colon into the ventral abdominal wall on series 3, image 80 without discrete bowel containing hernia appreciated.  No pneumoperitoneum or intra-abdominal abscess.  No inguinal hernia.  There is     Bones: No evidence of acute lumbar compression fracture. There is degenerative change of the lumbar spine in the form of marginal osteophyte formation, degenerative disc space narrowing, and degenerative facet arthropathy.  There is a minimal grade I retrolisthesis of L5 on S1.  No definite osseous metastatic disease.     Impression:     1. Patient with a history of colon adenocarcinoma with worsening periaortic, retroperitoneal, mesenteric, and iliac chain lymphadenopathy the in the abdomen and pelvis when compared with the previous study suggesting worsening metastatic disease.  2.  Moderate left hydronephrosis and proximal left hydroureter to the level of possible retroperitoneal scarring.  Invasion/compression of the left ureter is not excluded.  3. Bilateral nephrolithiasis  4. Hepatomegaly and hepatic steatosis  5. Two tiny < 5 mm pulmonary nodules within the chest, unchanged.  No new pulmonary nodules.        Electronically signed by: John Sylvester MD  Date:                                            08/11/2023  Time:                                           11:50   Assessment:       1. Malignant neoplasm of sigmoid colon    2. 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  -Handouts provided on chemotherapy agents.    -Discussed the mechanism of action, potential side effects of this treatment as well as ways to manage them at home. Some of these side effects include but not limited to fever, nausea, vomiting, decreased appetite, fatigue, weakness, cytopenia's, myalgia/arthralgia, constipation, diarrhea, bleeding, headache, nail changes, taste change, hair thinning/loss, peripheral neuropathy, kidney toxicity, or ototoxicity.   -Dietary modifications discussed.  Detail instructions to be provided by dietician.   -Chemotherapy Binder supplied with contact information.   -Discussed follow-up with the physician for toxicity monitoring throughout treatment.    -The patient and family verbalized understanding. Consented the patient to the treatment plan and the patient was educated on the planned duration of the treatment and schedule of the treatment administration.        Assessment/Plan reviewed and approved by Dr. Santo   30 minutes were spent in coordination of patient's care, record review and counseling.

## 2023-08-24 ENCOUNTER — RESEARCH ENCOUNTER (OUTPATIENT)
Dept: HEMATOLOGY/ONCOLOGY | Facility: CLINIC | Age: 53
End: 2023-08-24
Payer: MEDICAID

## 2023-08-24 ENCOUNTER — TELEPHONE (OUTPATIENT)
Dept: HEMATOLOGY/ONCOLOGY | Facility: CLINIC | Age: 53
End: 2023-08-24
Payer: MEDICAID

## 2023-08-24 ENCOUNTER — CLINICAL SUPPORT (OUTPATIENT)
Dept: HEMATOLOGY/ONCOLOGY | Facility: CLINIC | Age: 53
End: 2023-08-24
Payer: MEDICAID

## 2023-08-24 ENCOUNTER — INFUSION (OUTPATIENT)
Dept: INFUSION THERAPY | Facility: HOSPITAL | Age: 53
End: 2023-08-24
Attending: INTERNAL MEDICINE
Payer: MEDICAID

## 2023-08-24 ENCOUNTER — DOCUMENTATION ONLY (OUTPATIENT)
Dept: INFUSION THERAPY | Facility: HOSPITAL | Age: 53
End: 2023-08-24
Payer: MEDICAID

## 2023-08-24 VITALS
TEMPERATURE: 98 F | SYSTOLIC BLOOD PRESSURE: 146 MMHG | HEART RATE: 118 BPM | WEIGHT: 291.25 LBS | DIASTOLIC BLOOD PRESSURE: 84 MMHG | BODY MASS INDEX: 46.81 KG/M2 | RESPIRATION RATE: 18 BRPM | HEIGHT: 66 IN

## 2023-08-24 VITALS
DIASTOLIC BLOOD PRESSURE: 85 MMHG | HEIGHT: 66 IN | HEART RATE: 84 BPM | BODY MASS INDEX: 46.81 KG/M2 | TEMPERATURE: 97 F | RESPIRATION RATE: 16 BRPM | WEIGHT: 291.25 LBS | SYSTOLIC BLOOD PRESSURE: 129 MMHG | OXYGEN SATURATION: 98 %

## 2023-08-24 DIAGNOSIS — C77.2 METASTASIS TO INTESTINAL LYMPH NODE: ICD-10-CM

## 2023-08-24 DIAGNOSIS — C18.7 MALIGNANT NEOPLASM OF SIGMOID COLON: Primary | ICD-10-CM

## 2023-08-24 DIAGNOSIS — C18.7 MALIGNANT NEOPLASM OF SIGMOID COLON: ICD-10-CM

## 2023-08-24 DIAGNOSIS — C77.2 METASTASIS TO INTESTINAL LYMPH NODE: Primary | ICD-10-CM

## 2023-08-24 PROCEDURE — 99214 OFFICE O/P EST MOD 30 MIN: CPT | Mod: PBBFAC,PN

## 2023-08-24 PROCEDURE — 63600175 PHARM REV CODE 636 W HCPCS: Mod: PN | Performed by: INTERNAL MEDICINE

## 2023-08-24 PROCEDURE — 96367 TX/PROPH/DG ADDL SEQ IV INF: CPT | Mod: PN

## 2023-08-24 PROCEDURE — 96417 CHEMO IV INFUS EACH ADDL SEQ: CPT | Mod: PN

## 2023-08-24 PROCEDURE — 99999 PR PBB SHADOW E&M-EST. PATIENT-LVL IV: ICD-10-PCS | Mod: PBBFAC,,,

## 2023-08-24 PROCEDURE — 96415 CHEMO IV INFUSION ADDL HR: CPT | Mod: PN

## 2023-08-24 PROCEDURE — 99214 OFFICE O/P EST MOD 30 MIN: CPT | Mod: S$PBB,,,

## 2023-08-24 PROCEDURE — 99999 PR PBB SHADOW E&M-EST. PATIENT-LVL IV: CPT | Mod: PBBFAC,,,

## 2023-08-24 PROCEDURE — 96375 TX/PRO/DX INJ NEW DRUG ADDON: CPT | Mod: PN

## 2023-08-24 PROCEDURE — 96413 CHEMO IV INFUSION 1 HR: CPT | Mod: PN

## 2023-08-24 PROCEDURE — 25000003 PHARM REV CODE 250: Mod: PN | Performed by: INTERNAL MEDICINE

## 2023-08-24 PROCEDURE — 99214 PR OFFICE/OUTPT VISIT, EST, LEVL IV, 30-39 MIN: ICD-10-PCS | Mod: S$PBB,,,

## 2023-08-24 RX ORDER — DIPHENHYDRAMINE HYDROCHLORIDE 50 MG/ML
50 INJECTION INTRAMUSCULAR; INTRAVENOUS ONCE AS NEEDED
Status: COMPLETED | OUTPATIENT
Start: 2023-08-24 | End: 2023-08-24

## 2023-08-24 RX ORDER — ONDANSETRON 2 MG/ML
8 INJECTION INTRAMUSCULAR; INTRAVENOUS ONCE
Status: COMPLETED | OUTPATIENT
Start: 2023-08-24 | End: 2023-08-24

## 2023-08-24 RX ORDER — ACETAMINOPHEN 325 MG/1
650 TABLET ORAL
Status: COMPLETED | OUTPATIENT
Start: 2023-08-24 | End: 2023-08-24

## 2023-08-24 RX ORDER — DIPHENHYDRAMINE HCL 50 MG
50 CAPSULE ORAL
Status: DISCONTINUED | OUTPATIENT
Start: 2023-08-24 | End: 2023-08-24

## 2023-08-24 RX ORDER — HEPARIN 100 UNIT/ML
500 SYRINGE INTRAVENOUS
Status: DISCONTINUED | OUTPATIENT
Start: 2023-08-24 | End: 2023-08-24 | Stop reason: HOSPADM

## 2023-08-24 RX ORDER — DIPHENHYDRAMINE HYDROCHLORIDE 50 MG/ML
25 INJECTION INTRAMUSCULAR; INTRAVENOUS
Status: DISCONTINUED | OUTPATIENT
Start: 2023-08-24 | End: 2023-08-24 | Stop reason: HOSPADM

## 2023-08-24 RX ORDER — EPINEPHRINE 0.3 MG/.3ML
0.3 INJECTION SUBCUTANEOUS ONCE AS NEEDED
Status: DISCONTINUED | OUTPATIENT
Start: 2023-08-24 | End: 2023-08-24 | Stop reason: HOSPADM

## 2023-08-24 RX ORDER — SODIUM CHLORIDE 0.9 % (FLUSH) 0.9 %
10 SYRINGE (ML) INJECTION
Status: DISCONTINUED | OUTPATIENT
Start: 2023-08-24 | End: 2023-08-24 | Stop reason: HOSPADM

## 2023-08-24 RX ORDER — FAMOTIDINE 10 MG/ML
20 INJECTION INTRAVENOUS
Status: COMPLETED | OUTPATIENT
Start: 2023-08-24 | End: 2023-08-24

## 2023-08-24 RX ADMIN — HYDROCORTISONE SODIUM SUCCINATE 100 MG: 100 INJECTION, POWDER, FOR SOLUTION INTRAMUSCULAR; INTRAVENOUS at 04:08

## 2023-08-24 RX ADMIN — SODIUM CHLORIDE: 9 INJECTION, SOLUTION INTRAVENOUS at 09:08

## 2023-08-24 RX ADMIN — DIPHENHYDRAMINE HYDROCHLORIDE 50 MG: 50 INJECTION, SOLUTION INTRAMUSCULAR; INTRAVENOUS at 10:08

## 2023-08-24 RX ADMIN — ONDANSETRON 8 MG: 2 INJECTION INTRAMUSCULAR; INTRAVENOUS at 01:08

## 2023-08-24 RX ADMIN — ACETAMINOPHEN 650 MG: 325 TABLET ORAL at 10:08

## 2023-08-24 RX ADMIN — FAMOTIDINE 20 MG: 10 INJECTION INTRAVENOUS at 04:08

## 2023-08-24 RX ADMIN — DIPHENHYDRAMINE HYDROCHLORIDE 50 MG: 50 INJECTION INTRAMUSCULAR; INTRAVENOUS at 04:08

## 2023-08-24 RX ADMIN — CETUXIMAB 1200 MG: 2 SOLUTION INTRAVENOUS at 10:08

## 2023-08-24 RX ADMIN — PROMETHAZINE HYDROCHLORIDE 12.5 MG: 25 INJECTION INTRAMUSCULAR; INTRAVENOUS at 04:08

## 2023-08-24 RX ADMIN — Medication 500 UNITS: at 02:08

## 2023-08-24 NOTE — PLAN OF CARE
"Pt still c/o 'some nausea", per MD ok to d/c home.  Instructions given to pt/fly member if symptoms get worse report to ED, verbalized understanding.  Pt d/c via w/c in NAD..  "

## 2023-08-24 NOTE — PROGRESS NOTES
Gail Mckinnon, MRN [id number]  OG :  Randomized Phase II Trial of Encorafenib and Cetuximab with or Without Nivolumab ([facility] #[id number]) for Patients with Previously Treated, Microsatellite Stable BRAF V600E Metastatic and/or Unresectable Colorectal Cancer   IRB#: 2023.047  PID# [id number]  : NGUYEN Degroot MD  Treating Investigator: JESUS Santo MD     Cycle 1, Day 1  Arm 1/ Nivolumab + Cetuximab + Encorafenib therapy    Met with patient in infusion suite this morning.  Patient awake, alert, oriented to person, place, time.  Accompanied by daughter.  Just returned from chemo education class.  She states she took oral encorafenib at 8:20 this morning.  Patient states she spoke with Dr. Moss who instructed her to double her synthroid and that labs would be repeated in 6 weeks.  Pt. States continued willingness to participate in above-mentioned trial.     Select Baseline AE's reviewed today:  Rash, Grade 1:  No changes to this from last report; see CRC note on 8/21/23 for details.   Depression/Anxiety, Grade 1: well controlled.  Abdominal Pain, Grade 1:  States this intermittent and she has prescribed con meds that help.  She also has a pending consult with palliative medicine in the near future and is aware of this appointment.      Of note, CBC and CMP were repeated this morning; all labs NCS.  Research RN spoke with infusion RN Raquel Pitt regarding treatment plan per protocol.  Clarification provided on order and amount of time between administration of drugs as well as clarifying that it is ok per protocol to administer Benadryl IV.      Patient was provided with completed wallet card provided in the protocol and RN explained purpose of card and instructed patient to keep it with her in the event she seeks care outside of our facility.  She states understanding.  Patient was also provided with appointment slip listing all currently scheduled appointments, including next appointment in 2 weeks in  infusion suite.  She states understanding that she also views her appointments on [facility].  Patient was also provided with contact information for this RN as well as IVIS Greer, and reminded of primary RN Janis Adams's absence through Wed of next week.  She states understanding.

## 2023-08-24 NOTE — PROGRESS NOTES
Oncology Nutrition   Chemotherapy Infusion Visit    Nutrition Follow Up   RD met with patient and daughter at chairside during infusion treatment. Pt reports continues to do well nutritionally- eating without difficulty, maintaining weight, and denies nutrition related side effects. Pt eligible for TFP but denies need today.     Wt Readings from Last 10 Encounters:   08/24/23 132.1 kg (291 lb 3.6 oz)   08/24/23 132.1 kg (291 lb 3.6 oz)   08/21/23 131.3 kg (289 lb 7.4 oz)   08/14/23 130.3 kg (287 lb 4.2 oz)   07/31/23 129.5 kg (285 lb 7.9 oz)   07/31/23 129.5 kg (285 lb 7.9 oz)   07/07/23 127.8 kg (281 lb 12 oz)   07/05/23 127.8 kg (281 lb 12 oz)   06/29/23 127.8 kg (281 lb 12 oz)   06/21/23 128.9 kg (284 lb 2.8 oz)       All other nutrition questions/concerns addressed as appropriate. Will continue to follow and monitor throughout treatment PRN.     Raquel Bullock, MS, RD, LDN  08/24/2023  11:22 AM

## 2023-08-24 NOTE — TELEPHONE ENCOUNTER
"  Gail Mckinnon, MRN [id number]  OG :  Randomized Phase II Trial of Encorafenib and Cetuximab with or Without Nivolumab ([facility] #[id number]) for Patients with Previously Treated, Microsatellite Stable BRAF V600E Metastatic and/or Unresectable Colorectal Cancer   IRB#: 2023.047  PID# [id number]  : NGUYEN Degroot MD  Treating Investigator: JESUS Santo MD     Cycle 1, Day 1  Arm 1/ Nivolumab + Cetuximab + Encorafenib therapy    Telephone    Patient's daughter Radha called Research RN to say that her mom is experiencing shakes (rigors), nausea and a headache.  No thermometer available to check temperature, but states she "feels hot."  RN instructed them that patient should take phenergan and tylenol that they have on hand and will speak with Dr. Santo and call them back. Research RN spoke with Dr. Santo who states that she believes she is having a delayed infusion reaction to cetuximab and that she needs steroids ASAP.  Infusion related reaction, Grade 2.  Reached out to infusion charge nurse Jackelin, who states patient may come back to infusion suite to receive infusion reaction medication (Benadryl, Solu-Cortef, and famotidine).  Call placed to patient's daughter, they state understanding and are returning to infusion suite ASAP.  Infusion team expecting patient.      Of note, Dr. Santo does not believe this to be related to encorafenib and patient may continue with daily encorafenib as directed.  Patient instructed on this, states understanding.      "

## 2023-08-24 NOTE — PLAN OF CARE
Pt arrived to clinic today for Opdivo and Erbitux infusions and tolerated well. Pt did become nauseated as Erbitux was finishing infusing. Order received for Zofran and given to patient. Pt reported that nausea did improve, but still had a mild queasy feeling. Pt aware of follow up appointments and side effects of drugs. Discharged to home with daughter.  NAD.

## 2023-08-25 ENCOUNTER — TELEPHONE (OUTPATIENT)
Dept: HEMATOLOGY/ONCOLOGY | Facility: CLINIC | Age: 53
End: 2023-08-25
Payer: MEDICAID

## 2023-08-25 NOTE — TELEPHONE ENCOUNTER
"Friday, August 25th, 2023    Gail Mckinnon, MRN [id number]  OG :  Randomized Phase II Trial of Encorafenib and Cetuximab with or Without Nivolumab [place] [id number] for Patients with Previously Treated, Microsatellite Stable BRAF V600E Metastatic and/or Unresectable Colorectal Cancer   IRB#: 2023.047  PID# [id number]  : NGUYEN Degroot MD  Treating Investigator: JESUS Santo MD     Cycle 1, Day 2  Arm 1/ Nivolumab + Cetuximab + Encorafenib therapy    Telephone    9AM:  Called patient to follow up on how she's feeling.  Says she had a "rough night."  States worst symptom is a headache but seems to respond to tylenol.  Nausea is intermittent as well and responds to compazine.   She states she has not eaten much and has not taken her encorafenib this morning (still waking up).    Patient was instructed that she may continue to use the tylenol, compazine, and zofran (alternating) as directed.  She was instructed to watch her blood pressure to determine if it is running unusually high.  She was also instructed to try to get some fluids down and eat something bland that won't upset her stomach.  Research RN will check back on patient around lunch, she states understanding and knows to call or report to ED if symptoms worsen.      11:45PM:  Called patient back as discussed, no answer.  Called patient's daughter Radha who is not currently with her but stated she believes she is sleeping.  She said she believes she's feeling a bit better this morning though "not 100%."  Re-instructed on when to seek ER care, patient's daughter states understanding.                        "

## 2023-08-28 ENCOUNTER — TELEPHONE (OUTPATIENT)
Dept: HEMATOLOGY/ONCOLOGY | Facility: CLINIC | Age: 53
End: 2023-08-28
Payer: MEDICAID

## 2023-08-28 NOTE — TELEPHONE ENCOUNTER
"  Monday, August 28th, 2023    Gail Mckinnon, MRN [id number]  OG :  Randomized Phase II Trial of Encorafenib and Cetuximab with or Without Nivolumab ([facility] #[id number]) for Patients with Previously Treated, Microsatellite Stable BRAF V600E Metastatic and/or Unresectable Colorectal Cancer   IRB#: 2023.047  PID# [id number]  : NGUYEN Degroot MD  Treating Investigator: JESUS Santo MD     Cycle 1, Day 5  Arm 1/ Nivolumab + Cetuximab + Encorafenib therapy    Spoke with patient to follow up on how she's feeling.  She states she is feeling great and that all previously reported symptoms have dissipated.  She states Friday was "rough" and that she feels like she may have had a fever but did not take temperature.  Headache was off/on over the weekend as was mild nausea.  She states she did not require concomitant medication for these.  States she has been taking encorafenib as prescribed, no issues.  Infusion Related Reaction, Grade 2, resolved.    Notes new rash to the face at the base of her nose, states she feels this started on Friday and began using clindamycin cream as prescribed.  She states she has no new areas as of today and that area to face is improved.   Rash, Maculo-papular, Grade 1.      Re-instructed on points of contact (myself and ROSS Moura, CRC) until JESUS Adams's return on Wednesday, 8/30.  Also reminded of parameters of which to report to the ED, she states understanding of all of the above.  "

## 2023-08-29 ENCOUNTER — PATIENT MESSAGE (OUTPATIENT)
Dept: HEMATOLOGY/ONCOLOGY | Facility: CLINIC | Age: 53
End: 2023-08-29
Payer: MEDICAID

## 2023-08-30 ENCOUNTER — RESEARCH ENCOUNTER (OUTPATIENT)
Dept: HEMATOLOGY/ONCOLOGY | Facility: CLINIC | Age: 53
End: 2023-08-30
Payer: MEDICAID

## 2023-08-30 DIAGNOSIS — R21 RASH OF FACE: Primary | ICD-10-CM

## 2023-08-30 DIAGNOSIS — C18.7 MALIGNANT NEOPLASM OF SIGMOID COLON: ICD-10-CM

## 2023-08-30 RX ORDER — DOXYCYCLINE HYCLATE 100 MG
100 TABLET ORAL 2 TIMES DAILY
Qty: 60 TABLET | Refills: 3 | Status: SHIPPED | OUTPATIENT
Start: 2023-08-30 | End: 2023-10-08 | Stop reason: SDUPTHER

## 2023-08-30 NOTE — PROGRESS NOTES
"  Wednesday, August 30th, 2023     Gail Mckinnon, MRN [id number]  OG : Randomized Phase II Trial of Encorafenib and Cetuximab with or Without Nivolumab ([facility] #[id number]) for Patients with Previously Treated, Microsatellite Stable BRAF V600E Metastatic and/or Unresectable Colorectal Cancer   IRB#: 2023.047  PID# [id number]  : NGUYEN Degroot MD  Treating Investigator: JESUS Santo MD     Cycle 1, Day 7  Arm 1/ Nivolumab + Cetuximab + Encorafenib therapy    Follow-up note     Phoned and spoke with patient to follow up on how she's feeling. She reports continuing to feel well without any return of symptoms related to the delayed infusion reactions.    States she has been taking encorafenib as prescribed without issues.    She does admit to continued Grade 1 Baseline Fatigue, but reports being able to perform all ADLs. The patient was encouraged to take frequent rest breaks and reply to '[employer] message from [facility] to explore provided services related to managing fatigue. She verbalized agreeing to call with any worsening fatigue.     AEs:    Chills/Rigors- Grade 1 Start date 8/24/2023- Ended 8/24/2023 No temperatures taken while experiencing chills/rigors. Thermometer obtained on 8/26/2023 without elevated temperature since that time.  Nausea- Grade 1 Start date 8/24/2023-Ended 8/26/2023 Nausea responded to PRN Compazine and Zofran.  Headache -Grade 1 Start date 8/24/2023- Ended 8/27/2023 The patient reported as intermittent and denies any headache since Sunday 8/27/2023. Pt took Tylenol PRN- last dose 8/27/2023.   Pt. Reports that her BP readings taken at home have been "within normal" ranges, such as 120's/60's.   Vomiting- Grade 1 Start date 8/24/2023- Ended 8/24/2023 One dose of Zofran IV at infusion and then PRN home meds for nausea consisted of Zofran and Compazine.  Rash-Maculo-papular Grade 1 Start date 8/28/2023- patient reports rash started at base of her nose with additional new pimples " appearing on other areas of her face. She began applying clindamycin gel as prescribed. Pt was interested in having oral Doxycycline sent to pharmacy to have in case rash continues to worsen. After reviewing with Dr. Santo, a prescription for Doxycycline 100mg BID was routed to Dr. Santo for signature. Baseline Rash- Grade 1 Facial redness/rash ended 8/27/2023.    The patient also reported that she has not had to take any Rx pain medication for her BASELINE Abdominal pain since 8/27/2023. If pain continues to stay resolved at C1 Day 15, will discuss with the treating MD regarding ending on the baseline AE log.    This CRC reviewed upcoming appointments with the patient.  The patient appeared alert and without reported distress and denied having any present concerns or questions. She thanked this CRC for calling. She was encouraged to call with any new symptoms or issues, the patient verbalized understanding.    DAYDAY Adams

## 2023-08-31 DIAGNOSIS — C18.7 MALIGNANT NEOPLASM OF SIGMOID COLON: Primary | ICD-10-CM

## 2023-08-31 DIAGNOSIS — Z00.6 EXAMINATION OF PARTICIPANT IN CLINICAL TRIAL: ICD-10-CM

## 2023-08-31 LAB
ONEOME COMMENT: NORMAL
ONEOME METHOD: NORMAL

## 2023-09-04 DIAGNOSIS — C18.7 MALIGNANT NEOPLASM OF SIGMOID COLON: ICD-10-CM

## 2023-09-04 DIAGNOSIS — R11.0 NAUSEA: ICD-10-CM

## 2023-09-05 RX ORDER — LORAZEPAM 1 MG/1
1 TABLET ORAL EVERY 12 HOURS PRN
Qty: 30 TABLET | Refills: 0 | Status: SHIPPED | OUTPATIENT
Start: 2023-09-05 | End: 2023-10-08 | Stop reason: SDUPTHER

## 2023-09-06 ENCOUNTER — LAB VISIT (OUTPATIENT)
Dept: LAB | Facility: HOSPITAL | Age: 53
End: 2023-09-06
Attending: INTERNAL MEDICINE
Payer: MEDICAID

## 2023-09-06 ENCOUNTER — OFFICE VISIT (OUTPATIENT)
Dept: PALLIATIVE MEDICINE | Facility: CLINIC | Age: 53
End: 2023-09-06
Payer: MEDICAID

## 2023-09-06 VITALS
HEIGHT: 66 IN | BODY MASS INDEX: 45.81 KG/M2 | OXYGEN SATURATION: 97 % | RESPIRATION RATE: 16 BRPM | SYSTOLIC BLOOD PRESSURE: 118 MMHG | DIASTOLIC BLOOD PRESSURE: 62 MMHG | WEIGHT: 285.06 LBS | HEART RATE: 82 BPM | TEMPERATURE: 96 F

## 2023-09-06 DIAGNOSIS — Z51.5 PALLIATIVE CARE BY SPECIALIST: Primary | ICD-10-CM

## 2023-09-06 DIAGNOSIS — F32.A DEPRESSIVE DISORDER: ICD-10-CM

## 2023-09-06 DIAGNOSIS — G89.29 CHRONIC MUSCULOSKELETAL PAIN: ICD-10-CM

## 2023-09-06 DIAGNOSIS — Z00.6 EXAMINATION OF PARTICIPANT IN CLINICAL TRIAL: ICD-10-CM

## 2023-09-06 DIAGNOSIS — Z09 CHEMOTHERAPY FOLLOW-UP EXAMINATION: ICD-10-CM

## 2023-09-06 DIAGNOSIS — C18.7 MALIGNANT NEOPLASM OF SIGMOID COLON: ICD-10-CM

## 2023-09-06 DIAGNOSIS — Z71.89 ACP (ADVANCE CARE PLANNING): ICD-10-CM

## 2023-09-06 DIAGNOSIS — M79.18 CHRONIC MUSCULOSKELETAL PAIN: ICD-10-CM

## 2023-09-06 LAB
ALBUMIN SERPL BCP-MCNC: 3.7 G/DL (ref 3.5–5.2)
ALP SERPL-CCNC: 93 U/L (ref 55–135)
ALT SERPL W/O P-5'-P-CCNC: 42 U/L (ref 10–44)
ANION GAP SERPL CALC-SCNC: 10 MMOL/L (ref 8–16)
AST SERPL-CCNC: 33 U/L (ref 10–40)
BASOPHILS # BLD AUTO: 0.04 K/UL (ref 0–0.2)
BASOPHILS NFR BLD: 0.7 % (ref 0–1.9)
BILIRUB SERPL-MCNC: 0.4 MG/DL (ref 0.1–1)
BUN SERPL-MCNC: 12 MG/DL (ref 6–20)
CALCIUM SERPL-MCNC: 10.9 MG/DL (ref 8.7–10.5)
CHLORIDE SERPL-SCNC: 110 MMOL/L (ref 95–110)
CO2 SERPL-SCNC: 21 MMOL/L (ref 23–29)
CREAT SERPL-MCNC: 0.8 MG/DL (ref 0.5–1.4)
DIFFERENTIAL METHOD: ABNORMAL
EOSINOPHIL # BLD AUTO: 0.4 K/UL (ref 0–0.5)
EOSINOPHIL NFR BLD: 6.7 % (ref 0–8)
ERYTHROCYTE [DISTWIDTH] IN BLOOD BY AUTOMATED COUNT: 15.9 % (ref 11.5–14.5)
EST. GFR  (NO RACE VARIABLE): >60 ML/MIN/1.73 M^2
GLUCOSE SERPL-MCNC: 94 MG/DL (ref 70–110)
HCT VFR BLD AUTO: 45.9 % (ref 37–48.5)
HGB BLD-MCNC: 14.3 G/DL (ref 12–16)
IMM GRANULOCYTES # BLD AUTO: 0.04 K/UL (ref 0–0.04)
IMM GRANULOCYTES NFR BLD AUTO: 0.7 % (ref 0–0.5)
LYMPHOCYTES # BLD AUTO: 1.5 K/UL (ref 1–4.8)
LYMPHOCYTES NFR BLD: 25.6 % (ref 18–48)
MAGNESIUM SERPL-MCNC: 1.9 MG/DL (ref 1.6–2.6)
MCH RBC QN AUTO: 29.3 PG (ref 27–31)
MCHC RBC AUTO-ENTMCNC: 31.2 G/DL (ref 32–36)
MCV RBC AUTO: 94 FL (ref 82–98)
MONOCYTES # BLD AUTO: 0.8 K/UL (ref 0.3–1)
MONOCYTES NFR BLD: 14.5 % (ref 4–15)
NEUTROPHILS # BLD AUTO: 3 K/UL (ref 1.8–7.7)
NEUTROPHILS NFR BLD: 51.8 % (ref 38–73)
NRBC BLD-RTO: 0 /100 WBC
PLATELET # BLD AUTO: 316 K/UL (ref 150–450)
PMV BLD AUTO: 9.9 FL (ref 9.2–12.9)
POTASSIUM SERPL-SCNC: 4.5 MMOL/L (ref 3.5–5.1)
PROT SERPL-MCNC: 7.1 G/DL (ref 6–8.4)
RBC # BLD AUTO: 4.88 M/UL (ref 4–5.4)
SODIUM SERPL-SCNC: 141 MMOL/L (ref 136–145)
WBC # BLD AUTO: 5.79 K/UL (ref 3.9–12.7)

## 2023-09-06 PROCEDURE — 99999 PR PBB SHADOW E&M-EST. PATIENT-LVL V: ICD-10-PCS | Mod: PBBFAC,,, | Performed by: STUDENT IN AN ORGANIZED HEALTH CARE EDUCATION/TRAINING PROGRAM

## 2023-09-06 PROCEDURE — 1159F MED LIST DOCD IN RCRD: CPT | Mod: CPTII,,, | Performed by: STUDENT IN AN ORGANIZED HEALTH CARE EDUCATION/TRAINING PROGRAM

## 2023-09-06 PROCEDURE — 99215 OFFICE O/P EST HI 40 MIN: CPT | Mod: PBBFAC,PN,25 | Performed by: STUDENT IN AN ORGANIZED HEALTH CARE EDUCATION/TRAINING PROGRAM

## 2023-09-06 PROCEDURE — 1159F PR MEDICATION LIST DOCUMENTED IN MEDICAL RECORD: ICD-10-PCS | Mod: CPTII,,, | Performed by: STUDENT IN AN ORGANIZED HEALTH CARE EDUCATION/TRAINING PROGRAM

## 2023-09-06 PROCEDURE — 99999 PR PBB SHADOW E&M-EST. PATIENT-LVL V: CPT | Mod: PBBFAC,,, | Performed by: STUDENT IN AN ORGANIZED HEALTH CARE EDUCATION/TRAINING PROGRAM

## 2023-09-06 PROCEDURE — 80053 COMPREHEN METABOLIC PANEL: CPT | Mod: PO | Performed by: INTERNAL MEDICINE

## 2023-09-06 PROCEDURE — 83735 ASSAY OF MAGNESIUM: CPT | Mod: PO | Performed by: INTERNAL MEDICINE

## 2023-09-06 PROCEDURE — 1125F PR PAIN SEVERITY QUANTIFIED, PAIN PRESENT: ICD-10-PCS | Mod: CPTII,,, | Performed by: STUDENT IN AN ORGANIZED HEALTH CARE EDUCATION/TRAINING PROGRAM

## 2023-09-06 PROCEDURE — 3074F PR MOST RECENT SYSTOLIC BLOOD PRESSURE < 130 MM HG: ICD-10-PCS | Mod: CPTII,,, | Performed by: STUDENT IN AN ORGANIZED HEALTH CARE EDUCATION/TRAINING PROGRAM

## 2023-09-06 PROCEDURE — 99204 PR OFFICE/OUTPT VISIT, NEW, LEVL IV, 45-59 MIN: ICD-10-PCS | Mod: S$PBB,,, | Performed by: STUDENT IN AN ORGANIZED HEALTH CARE EDUCATION/TRAINING PROGRAM

## 2023-09-06 PROCEDURE — 99490 CHRNC CARE MGMT STAFF 1ST 20: CPT | Mod: S$PBB,,, | Performed by: STUDENT IN AN ORGANIZED HEALTH CARE EDUCATION/TRAINING PROGRAM

## 2023-09-06 PROCEDURE — 99204 OFFICE O/P NEW MOD 45 MIN: CPT | Mod: S$PBB,,, | Performed by: STUDENT IN AN ORGANIZED HEALTH CARE EDUCATION/TRAINING PROGRAM

## 2023-09-06 PROCEDURE — 99490 CHRNC CARE MGMT STAFF 1ST 20: CPT | Mod: PBBFAC,PN | Performed by: STUDENT IN AN ORGANIZED HEALTH CARE EDUCATION/TRAINING PROGRAM

## 2023-09-06 PROCEDURE — 85025 COMPLETE CBC W/AUTO DIFF WBC: CPT | Mod: PN | Performed by: INTERNAL MEDICINE

## 2023-09-06 PROCEDURE — 3078F DIAST BP <80 MM HG: CPT | Mod: CPTII,,, | Performed by: STUDENT IN AN ORGANIZED HEALTH CARE EDUCATION/TRAINING PROGRAM

## 2023-09-06 PROCEDURE — 1125F AMNT PAIN NOTED PAIN PRSNT: CPT | Mod: CPTII,,, | Performed by: STUDENT IN AN ORGANIZED HEALTH CARE EDUCATION/TRAINING PROGRAM

## 2023-09-06 PROCEDURE — 99490 PR CHRONIC CARE MGMT, 1ST 20 MIN: ICD-10-PCS | Mod: S$PBB,,, | Performed by: STUDENT IN AN ORGANIZED HEALTH CARE EDUCATION/TRAINING PROGRAM

## 2023-09-06 PROCEDURE — 3074F SYST BP LT 130 MM HG: CPT | Mod: CPTII,,, | Performed by: STUDENT IN AN ORGANIZED HEALTH CARE EDUCATION/TRAINING PROGRAM

## 2023-09-06 PROCEDURE — 1160F RVW MEDS BY RX/DR IN RCRD: CPT | Mod: CPTII,,, | Performed by: STUDENT IN AN ORGANIZED HEALTH CARE EDUCATION/TRAINING PROGRAM

## 2023-09-06 PROCEDURE — 1160F PR REVIEW ALL MEDS BY PRESCRIBER/CLIN PHARMACIST DOCUMENTED: ICD-10-PCS | Mod: CPTII,,, | Performed by: STUDENT IN AN ORGANIZED HEALTH CARE EDUCATION/TRAINING PROGRAM

## 2023-09-06 PROCEDURE — 3078F PR MOST RECENT DIASTOLIC BLOOD PRESSURE < 80 MM HG: ICD-10-PCS | Mod: CPTII,,, | Performed by: STUDENT IN AN ORGANIZED HEALTH CARE EDUCATION/TRAINING PROGRAM

## 2023-09-06 PROCEDURE — 36415 COLL VENOUS BLD VENIPUNCTURE: CPT | Mod: PN | Performed by: INTERNAL MEDICINE

## 2023-09-06 RX ORDER — DULOXETIN HYDROCHLORIDE 30 MG/1
60 CAPSULE, DELAYED RELEASE ORAL DAILY
Qty: 60 CAPSULE | Refills: 0 | Status: SHIPPED | OUTPATIENT
Start: 2023-09-06 | End: 2023-10-20 | Stop reason: SDUPTHER

## 2023-09-06 NOTE — PROGRESS NOTES
Office Visit  [facility]      Consult Requested By: Dr. Marah Santo  Reason for Consult: pain and symptom management      ASSESSMENT/PLAN:     Plan/Recommendations:  Gail was seen today for palliative care.    Diagnoses and all orders for this visit:    Palliative care by specialist  -     DULoxetine (CYMBALTA) 30 MG capsule; Take 2 capsules (60 mg total) by mouth once daily.    Malignant neoplasm of sigmoid colon  -     Ambulatory referral/consult to [facility] Palliative Care  -     Ambulatory referral/consult to Hematology/Oncology/Psychology; Future    Depressive disorder  -     Ambulatory referral/consult to Hematology/Oncology/Psychology; Future  -     DULoxetine (CYMBALTA) 30 MG capsule; Take 2 capsules (60 mg total) by mouth once daily.    Chronic musculoskeletal pain    ACP (advance care planning)    # Malignant neoplasm of sigmoid colon  # Cancer related pain  # Chronic MSK pain  Resolved abdominal pain after starting SWOG 2017 clinical trial. Previously taking Percocet, tylenol and ibuprofen as needed. Endorses generalized joint pain in knees, hips. With history of depression, would opt to initiate SNRI.  - Start duloxetine 30mg x 1 week followed by 60mg daily scheduled    # Advance care planning  Discussed importance of advance care planning including serious illness conversations with next of kin including interventions such as CPR and mechanical ventilation. Also discussed the importance of documentation of these wishes in a formal living will. Additional documents including LAPOST and HCPOA were also discussed briefly.     # Major depressive disorder  See PHQ 9. Depressed mood. Previously prescribed Prozac however has not been consistent with use.   - Follow up psychology, psychiatry  - Not takign Prozac  - Start duloxetine 30mg once daily x 1 week then 60mg once daily scheduled    Follow up: 10/11/23    SUBJECTIVE:     History of Present Illness:  Patient is a 53 y.o. year old  female presenting with sigmoid colon cancer first diagnosed in 2020 with metastasis to intestinal lymph node now on SWOG 2017 clinical trial with palliative intent. Referred by Dr. Santo.      Discussed the role of palliative care including symptom management and goals of care elucidation through cancer journey. She presents to the clinic alone. She endorses pain in abdomen for 3 months from intestinal lymph node with cancer. After first cancer treatment through clinical trial, she has not had any pain at all which has been miraculous for her. However she was needing pain meds during that 3 month period prior to starting clinical trial including tylenol and ibuprofen. Ibuprofen helped for an hour. Prescribed percocet 10-325mg 2 weeks before first clinical trial treatment. Percocet took 45 minutes to get into her system. Pain control from 11pm-9am with this regimen.     Fatigue is improved on this chemo regimen than prior. Can now get around and get some things done. She is considering going back to work.    Nausea yesterday. Vomited afterwards. Finds benefit from zofran 8mg ODT.     Denies diarrhea. Constipation off and on. Takes lactulose as needed and probiotic and senna at night.    Has had some problems with sleeping in the past 4-5 nights. Related to stress financially. Looking for a part time job. Lives with two adult children. Daughter is 24 and getting  next year. Son is 33 and history of addiction. Other son is 30 and going through a divorce. Feels like her children don't understand what she is going through in her cancer journey even though she looks okay on the outside. Has not been consistent with Prozac. Saw Dr. Palm before he left. No follow up with psychology since. Takes ativan at night.    Lack of appetite. Weight fluctuates. Hopeful to not need steroids.     Endorses joint pain in hips and knees in past several weeks.     Past Medical History:   Diagnosis Date    Anxiety     Depression      FH: ovarian cancer 3/16/2020    Hx of psychiatric care     Effexor, Paxil, Lexapro, Zoloft, Wellbutrin, Trazodone Buspar    Hyperthyroidism     Hypothyroid     Kidney calculi     Malignant neoplasm of sigmoid colon 3/16/2020    Menorrhagia     Multinodular goiter 2012    Palpitation     Psychiatric problem     Venous insufficiency      Past Surgical History:   Procedure Laterality Date     SECTION, CLASSIC      x3    COLONOSCOPY N/A 2020    Procedure: COLONOSCOPY;  Surgeon: Shane Parker MD;  Location: [facility] ENDO;  Service: Endoscopy;  Laterality: N/A;    COLONOSCOPY N/A 2022    Procedure: COLONOSCOPY;  Surgeon: Shane Parker MD;  Location: [facility] ENDO;  Service: Endoscopy;  Laterality: N/A;    COLONOSCOPY N/A 2023    Procedure: COLONOSCOPY;  Surgeon: Shane Parker MD;  Location: [facility];  Service: Endoscopy;  Laterality: N/A;  no cecum anastomosis    CYSTOSCOPY W/ URETERAL STENT PLACEMENT Bilateral 3/25/2020    Procedure: CYSTOSCOPY, WITH URETERAL STENT INSERTION;  Surgeon: Claudio Tyson MD;  Location: [facility] OR [address] [place] [address];  Service: Urology;  Laterality: Bilateral;    INSERTION OF TUNNELED CENTRAL VENOUS CATHETER (CVC) WITH SUBCUTANEOUS PORT N/A 2020    Procedure: VEOAIVYFL-OEMP-S-CATH;  Surgeon: [facility];  [facility];  Service: Radiology;  Laterality: N/A;  Room [facility]    LAPAROSCOPIC SIGMOIDECTOMY N/A 3/25/2020    Procedure: COLECTOMY, SIGMOID, LAPAROSCOPIC, flex sig, ERAS high;  Surgeon: Silvio Man MD;  [facility];  Service: Colon and Rectal;  Laterality: N/A;    MOBILIZATION OF SPLENIC FLEXURE  3/25/2020    Procedure: MOBILIZATION, SPLENIC FLEXURE;  Surgeon: Silvio Man MD;  [facility];  Service: Colon and Rectal;;    tonsillectomy      TOTAL ABDOMINAL HYSTERECTOMY W/ BILATERAL SALPINGOOPHORECTOMY N/A 3/25/2020    Procedure: HYSTERECTOMY, TOTAL, ABDOMINAL, WITH BILATERAL  SALPINGO-OOPHORECTOMY;  Surgeon: Keron Brady MD;  Location: [facility] OR [address] [place] [address];  Service: Oncology;  Laterality: N/A;     Family History   Problem Relation Age of Onset    Heart disease Father     Diabetes Mother 65    Drug abuse Brother     Drug abuse Brother     Cancer Maternal Aunt         lung cancer    Cancer Maternal Grandmother         stomach cancer- started in ovaries    Ovarian cancer Maternal Grandmother         stomach cancer- started in ovaries    Colon cancer Paternal Uncle     Cancer Paternal Uncle     Ovarian cancer Maternal Aunt     Ovarian cancer Maternal Aunt     Ovarian cancer Cousin         mother had ovarian cancer    Drug abuse Son     Drug abuse Son         clean/sober since 2012    Colon cancer Son     No Known Problems Daughter     Uterine cancer Neg Hx     Breast cancer Neg Hx      Review of patient's allergies indicates:   Allergen Reactions    Oxaliplatin Other (See Comments)     Itchy hands, throat closed up, trouble hearing - during infusion    Penicillins Hives and Rash     childhood allergy  childhood allergy  unknown     Social Determinants of Health     Tobacco Use: Low Risk  (9/7/2023)    Patient History     Smoking Tobacco Use: Never     Smokeless Tobacco Use: Never     Passive Exposure: Not on file   Alcohol Use: Not on file   Financial Resource Strain: Not on file   Food Insecurity: Not on file   Transportation Needs: Not on file   Physical Activity: Not on file   Stress: Not on file   Social Connections: Not on file   Housing Stability: Not on file   Depression: Low Risk  (9/7/2023)    Depression     Last PHQ-4: Flowsheet Data: 2   Recent Concern: Depression - Medium Risk (9/6/2023)    Depression     Last PHQ-4: Flowsheet Data: 3      The Modified Caregiver Strain Index (MCSI) -   No data recorded   No data recorded   No data recorded   No data recorded   No data recorded   No data recorded   No data recorded   No data recorded   No data recorded   No data recorded   No data  recorded   No data recorded   No data recorded   No data recorded       Medications:    Current Outpatient Medications:     acetaminophen (TYLENOL) 500 MG tablet, Take 2 tablets (1,000 mg total) by mouth every 8 (eight) hours., Disp: 60 tablet, Rfl: 0    buPROPion (WELLBUTRIN SR) 150 MG TBSR 12 hr tablet, Take 1 tablet (150 mg total) by mouth 2 (two) times daily. (Patient taking differently: Take 150 mg by mouth once daily.), Disp: 180 tablet, Rfl: 0    clindamycin phosphate 1% (CLINDAGEL) 1 % gel, Apply topically 2 (two) times daily., Disp: 60 g, Rfl: 3    doxycycline (VIBRA-TABS) 100 MG tablet, Take 1 tablet (100 mg total) by mouth 2 (two) times daily., Disp: 60 tablet, Rfl: 3    encorafenib 75 mg Cap, Take 4 tablets (300 mg total) by mouth once daily., Disp: 120 capsule, Rfl: 0    FLUoxetine (PROZAC) 20 MG capsule, Take 1 capsule (20 mg total) by mouth once daily., Disp: 30 capsule, Rfl: 0    granisetron (SANCUSO) 3.1 mg/24 hour, Place 1 patch (3.1 mg total) onto the skin every 7 days AS DIRECTED., Disp: 4 patch, Rfl: 3    Lactobacillus rhamnosus GG (CULTURELLE) 10 billion cell capsule, Take 1 capsule by mouth once daily., Disp: , Rfl:     levothyroxine (SYNTHROID) 200 MCG tablet, Take 1 tablet (200 mcg total) by mouth once daily., Disp: 90 tablet, Rfl: 1    LIDOcaine-prilocaine (EMLA) cream, Apply topically as needed (30 to 60 min prior chemo or port use)., Disp: 30 g, Rfl: 3    LORazepam (ATIVAN) 1 MG tablet, Take 1 tablet (1 mg total) by mouth every 12 (twelve) hours as needed for Anxiety (nausea)., Disp: 30 tablet, Rfl: 0    magic mouthwash diphen/antac/lidoc/nysta, Swish and swallow 5 mL every 4 hours as needed for mouth pain/ulcers, Disp: 120 mL, Rfl: 2    multivitamin (THERAGRAN) per tablet, Take 1 tablet by mouth once daily., Disp: , Rfl:     olmesartan (BENICAR) 20 MG tablet, Take 1 tablet (20 mg total) by mouth once daily., Disp: 30 tablet, Rfl: 5    ondansetron (ZOFRAN-ODT) 8 MG TbDL, Take 1 tablet (8  mg total) by mouth every 8 (eight) hours as needed., Disp: 60 tablet, Rfl: 3    oxyCODONE-acetaminophen (PERCOCET)  mg per tablet, Take 1 tablet by mouth every 6 (six) hours as needed for Pain., Disp: 60 tablet, Rfl: 0    prochlorperazine (COMPAZINE) 10 MG tablet, Take 1 tablet (10 mg total) by mouth every 6 (six) hours as needed., Disp: 30 tablet, Rfl: 6    senna-docusate 8.6-50 mg (PERICOLACE) 8.6-50 mg per tablet, Take 2 tablets by mouth 2 (two) times daily., Disp: , Rfl:     traZODone (DESYREL) 50 MG tablet, Take 1 tablet (50 mg total) by mouth nightly., Disp: 30 tablet, Rfl: 2    DULoxetine (CYMBALTA) 30 MG capsule, Take 2 capsules (60 mg total) by mouth once daily., Disp: 60 capsule, Rfl: 0    OBJECTIVE:       ROS:  Review of Systems   Constitutional:  Negative for appetite change and unexpected weight change.   HENT:  Negative for mouth sores.    Eyes:  Negative for visual disturbance.   Respiratory:  Negative for cough and shortness of breath.    Cardiovascular:  Negative for chest pain.   Gastrointestinal:  Negative for abdominal pain and diarrhea.   Genitourinary:  Negative for frequency.   Musculoskeletal:  Positive for back pain.   Skin:  Positive for rash.   Neurological:  Negative for headaches.   Hematological:  Negative for adenopathy.   Psychiatric/Behavioral:  The patient is nervous/anxious.        Review of Symptoms      Symptom Assessment (ESAS 0-10 Scale)  Pain:  0  Dyspnea:  0  Anxiety:  0  Nausea:  0  Depression:  0  Anorexia:  0  Fatigue:  0  Insomnia:  0  Restlessness:  0  Agitation:  0       Anxiety:  Is nervous/anxious    Performance Status:  80    ECOG Performance Status stGstrstastdstest:st st1st Living Arrangements:  Lives with family and Lives in home    Psychosocial/Cultural:   See Palliative Psychosocial Note: Yes  **Primary  to Follow**  Palliative Care  Consult: No     Time-Based Charting:  Yes  Chart Review: 20 minutes  Symptom Assessment: 35 minutes    Total  Time Spent: 55 minutes      Advance Care Planning   Advance Directives:     Decision Making:  Patient answered questions  Goals of Care: The patient endorses that what is most important right now is to focus on symptom/pain control    Accordingly, we have decided that the best plan to meet the patient's goals includes continuing with treatment              Physical Exam:  Vitals: Temp: 96.4 °F (35.8 °C) (09/06/23 1012)  Pulse: 82 (09/06/23 1012)  Resp: 16 (09/06/23 1012)  BP: 118/62 (09/06/23 1012)  SpO2: 97 % (09/06/23 1012)  Physical Exam  Vitals and nursing note reviewed.   Constitutional:       General: She is not in acute distress.     Appearance: Normal appearance. She is obese. She is not ill-appearing or toxic-appearing.   HENT:      Mouth/Throat:      Mouth: Mucous membranes are moist.      Pharynx: Oropharynx is clear.   Eyes:      General: No scleral icterus.     Conjunctiva/sclera: Conjunctivae normal.   Pulmonary:      Effort: Pulmonary effort is normal. No respiratory distress.   Abdominal:      General: Abdomen is flat. There is no distension.      Palpations: Abdomen is soft.      Tenderness: There is no abdominal tenderness.   Musculoskeletal:         General: Normal range of motion.      Cervical back: Normal range of motion.      Right lower leg: No edema.      Left lower leg: No edema.   Skin:     General: Skin is warm and dry.      Coloration: Skin is not jaundiced or pale.   Neurological:      Mental Status: She is alert and oriented to person, place, and time.   Psychiatric:         Mood and Affect: Mood normal.         Behavior: Behavior normal.         Thought Content: Thought content normal.         Judgment: Judgment normal.         Labs:  CBC:   WBC   Date Value Ref Range Status   09/06/2023 5.79 3.90 - 12.70 K/uL Final     Hemoglobin   Date Value Ref Range Status   09/06/2023 14.3 12.0 - 16.0 g/dL Final     Hematocrit   Date Value Ref Range Status   09/06/2023 45.9 37.0 - 48.5 % Final      MCV   Date Value Ref Range Status   09/06/2023 94 82 - 98 fL Final     Platelets   Date Value Ref Range Status   09/06/2023 316 150 - 450 K/uL Final       LFT:   Lab Results   Component Value Date    AST 33 09/06/2023    ALKPHOS 93 09/06/2023    BILITOT 0.4 09/06/2023       Albumin:   Albumin   Date Value Ref Range Status   09/06/2023 3.7 3.5 - 5.2 g/dL Final     Protein:   Total Protein   Date Value Ref Range Status   09/06/2023 7.1 6.0 - 8.4 g/dL Final       Radiology:None      55 minutes of total time spent on the encounter, which includes face to face time and non-face to face time preparing to see the patient (eg, review of tests), Obtaining and/or reviewing separately obtained history, Documenting clinical information in the electronic or other health record, Independently interpreting results (not separately reported) and communicating results to the patient/family/caregiver, or Care coordination (not separately reported).      Signature: Lara Hill DO

## 2023-09-07 ENCOUNTER — RESEARCH ENCOUNTER (OUTPATIENT)
Dept: RESEARCH | Facility: HOSPITAL | Age: 53
End: 2023-09-07
Payer: MEDICAID

## 2023-09-07 ENCOUNTER — OFFICE VISIT (OUTPATIENT)
Dept: HEMATOLOGY/ONCOLOGY | Facility: CLINIC | Age: 53
End: 2023-09-07
Payer: MEDICAID

## 2023-09-07 VITALS
RESPIRATION RATE: 16 BRPM | OXYGEN SATURATION: 96 % | BODY MASS INDEX: 45.42 KG/M2 | HEIGHT: 66 IN | DIASTOLIC BLOOD PRESSURE: 77 MMHG | SYSTOLIC BLOOD PRESSURE: 111 MMHG | HEART RATE: 92 BPM | TEMPERATURE: 98 F | WEIGHT: 282.63 LBS

## 2023-09-07 DIAGNOSIS — C77.2 METASTASIS TO INTESTINAL LYMPH NODE: ICD-10-CM

## 2023-09-07 DIAGNOSIS — C78.6 SECONDARY MALIGNANT NEOPLASM OF RETROPERITONEUM: ICD-10-CM

## 2023-09-07 DIAGNOSIS — C18.7 MALIGNANT NEOPLASM OF SIGMOID COLON: Primary | ICD-10-CM

## 2023-09-07 PROCEDURE — 4010F PR ACE/ARB THEARPY RXD/TAKEN: ICD-10-PCS | Mod: CPTII,,, | Performed by: INTERNAL MEDICINE

## 2023-09-07 PROCEDURE — 3078F DIAST BP <80 MM HG: CPT | Mod: CPTII,,, | Performed by: INTERNAL MEDICINE

## 2023-09-07 PROCEDURE — 3008F BODY MASS INDEX DOCD: CPT | Mod: CPTII,,, | Performed by: INTERNAL MEDICINE

## 2023-09-07 PROCEDURE — 99214 OFFICE O/P EST MOD 30 MIN: CPT | Mod: PBBFAC,PN | Performed by: INTERNAL MEDICINE

## 2023-09-07 PROCEDURE — 99999 PR PBB SHADOW E&M-EST. PATIENT-LVL IV: ICD-10-PCS | Mod: PBBFAC,,, | Performed by: INTERNAL MEDICINE

## 2023-09-07 PROCEDURE — 99215 PR OFFICE/OUTPT VISIT, EST, LEVL V, 40-54 MIN: ICD-10-PCS | Mod: S$PBB,,, | Performed by: INTERNAL MEDICINE

## 2023-09-07 PROCEDURE — 99999 PR PBB SHADOW E&M-EST. PATIENT-LVL IV: CPT | Mod: PBBFAC,,, | Performed by: INTERNAL MEDICINE

## 2023-09-07 PROCEDURE — 3074F PR MOST RECENT SYSTOLIC BLOOD PRESSURE < 130 MM HG: ICD-10-PCS | Mod: CPTII,,, | Performed by: INTERNAL MEDICINE

## 2023-09-07 PROCEDURE — 4010F ACE/ARB THERAPY RXD/TAKEN: CPT | Mod: CPTII,,, | Performed by: INTERNAL MEDICINE

## 2023-09-07 PROCEDURE — 3078F PR MOST RECENT DIASTOLIC BLOOD PRESSURE < 80 MM HG: ICD-10-PCS | Mod: CPTII,,, | Performed by: INTERNAL MEDICINE

## 2023-09-07 PROCEDURE — 3008F PR BODY MASS INDEX (BMI) DOCUMENTED: ICD-10-PCS | Mod: CPTII,,, | Performed by: INTERNAL MEDICINE

## 2023-09-07 PROCEDURE — 3074F SYST BP LT 130 MM HG: CPT | Mod: CPTII,,, | Performed by: INTERNAL MEDICINE

## 2023-09-07 PROCEDURE — 99215 OFFICE O/P EST HI 40 MIN: CPT | Mod: S$PBB,,, | Performed by: INTERNAL MEDICINE

## 2023-09-07 NOTE — PROGRESS NOTES
PROGRESS NOTE    Subjective:       Patient ID: Gail Mckinnon is a 53 y.o. female.  MRN: [id number]  : 1970    Chief Complaint: Metastatic colon cancer     History of Present Illness:   Gail Mckinnon is a 53 y.o. female who presents with colon cancer, initially stage III and now with LN recurrence.    On FOLIRI+ Avastin sine .     She was briefly switched to xeloda due to 5 FU shortage for a month. Did not tolerate Xeloda well due hand foot syndrome, blistering of feet, did not take the last day of Xeloda. Completed .     Resumed Folfiri + Avastin on 23. Atropine was held due to prior constipation.     Was admitted to the hospital from -23 for intractable nausea , vomiting and diarrhea as well as abdominal pain. No hematochezia or dark stool was noted.      US showed gallbladder stone and dilated CBD. She was managed conservatively. Had CT abd, pelvis, colonoscopy and MRCP that were relatively unremarkable without signs of cholecystitis. Cholecystectomy was not recommended.     She had recently resumed FOLFIRI and the symptoms started after the first cycle of resuming, never had issues prior to this.  Stayed in the hospital for 10 days.  C diff was negative. No other etiology was established. Symptoms improved over time and she was discharged on .     Interim history:  Enrolled in  SWOG 2107 (encorafenib/cetuximab and randomized to the Nivolumab arm), here to obtain for cycle 1, day 15.    Had cycle 1 day 1 on ,  initially uneventful but then developed rigors when she went home. Returned to infusion and treated with steroids with improvement.     Did not have any further issues. Denies diarrhea.   Developed a facial and back rash, started doxycyline just yesterday. Nausea is better.     Reports that her abdominal pain has resolved.     Aware of Grave's disease history in , treated with LEE, now on synthroid.      Oncology History:  She completed adjuvant FOLFOX in November 2020. She tolerated only 4 cycles of FOLFOX before she developed an infusion reaction to oxaliplatin in cycle 5 was well as cycle 6. She then completed 6 cycles of infusional 5 FU.     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 She was offered second line therapy with FOLFIRI and Avastin.      She presented to the ED on 11/26 with left flank pain. CT renal stone study showed Moderate hydronephrosis on the left secondary to 3 mm calculus at the UPJ.    She had a PET scan mid April that showed possible progression of her disease with      She has been to [facility] for another opinion. No change was recommended in systemic therapy but she was offered surgical removal of the aorta caval nodes.  Recent sans at [facility]  show stable disease.      Surgery done 7/12/22. Received 2 more cycle of FOLFIRI without Avastin.     She had repeat imaging at [facility] on 9/24/22 that unfortunately showed disease progression since her surgery with multiple RP nodes and one mediastinal node.       PET 12/8/22  Impression:     1. Progression of disease with interval increase of abdominal and pelvic lymphadenopathy.  2. New area of moderate FDG uptake at the right half of the T9 vertebral body (image 124).  Favor degenerative disc changes.  No underlying osteolytic or osteoblastic lesion.  Recommend continued attention on follow-up.  3. No other evidence of FDG avid disease.     12/22/22  Impression After scan comparison:     1. Metastatic portacaval and left periaortic retroperitoneal lymph nodes which remain stable between the CT of 09/24/2022 and the subsequent PET-CT of 12/08/2022.  There is no evidence of progression of metastatic disease.  2. Nonobstructing bilateral renal calculi and cholelithiasis.    2/10/22:  CT chest abd pelvis  Impression:     Stable periportal, retroperitoneal and mesenteric lymphadenopathy compared to PET-CT 12/08/2022.     Stable  right middle lobe 3 mm pulmonary nodule.  No new or enlarging pulmonary nodule.     Hepatic steatosis.  Hepatosplenomegaly.     Cholelithiasis.     Bilateral nonobstructing nephrolithiasis.     Small hiatal hernia with retained contrast in the distal esophagus.  Correlate for reflux.    She had virtual visit at [facility] on 2/17/23.     She has continued FOLFIRI and Avastin.     CT abd pelvis   5/7/23  Impression:     1. Mesenteric inflammatory changes have worsened since the prior study.  2. Mesenteric, retroperitoneal and left-sided pelvic enlarged lymph nodes are unchanged.     8/11/23  CT chest abd pelvis   Impression:     1. Patient with a history of colon adenocarcinoma with worsening periaortic, retroperitoneal, mesenteric, and iliac chain lymphadenopathy the in the abdomen and pelvis when compared with the previous study suggesting worsening metastatic disease.  2. Moderate left hydronephrosis and proximal left hydroureter to the level of possible retroperitoneal scarring.  Invasion/compression of the left ureter is not excluded.  3. Bilateral nephrolithiasis  4. Hepatomegaly and hepatic steatosis  5. Two tiny < 5 mm pulmonary nodules within the chest, unchanged.  No new pulmonary nodules.      Oncology History:  Oncology History   Malignant neoplasm of sigmoid colon   3/16/2020 Initial Diagnosis    Malignant neoplasm of sigmoid colon     3/31/2020 Cancer Staged    Staging form: Colon and Rectum, AJCC 8th Edition  - Clinical stage from 3/31/2020: Stage IIIC (cT4b, cN2a, cM0)     5/6/2020 - 11/17/2020 Chemotherapy    Treatment Summary   Plan Name: OP FOLFOX 6 Q2W  Treatment Goal: Curative  Status: Inactive  Start Date: 5/6/2020  End Date: 11/6/2020  Provider: Dylan Leyva MD  Chemotherapy: fluorouraciL injection 945 mg, 400 mg/m2 = 945 mg, Intravenous, Clinic/[facility] 1 time, 14 of 14 cycles  Administration: 945 mg (5/6/2020), 945 mg (5/20/2020), 945 mg (6/3/2020), 945 mg (6/17/2020), 945 mg (7/29/2020), 945 mg  (8/12/2020), 945 mg (8/26/2020), 945 mg (9/9/2020), 945 mg (9/23/2020), 945 mg (10/6/2020), 945 mg (10/21/2020), 945 mg (11/4/2020)  fluorouraciL 2,400 mg/m2 = 5,665 mg in sodium chloride 0.9% 240 mL chemo infusion, 2,400 mg/m2 = 5,665 mg, Intravenous, Over 46 hours, 14 of 14 cycles  Administration: 5,665 mg (5/6/2020), 5,665 mg (5/20/2020), 5,665 mg (6/3/2020), 5,665 mg (6/17/2020), 5,665 mg (7/29/2020), 5,665 mg (8/12/2020), 5,665 mg (8/26/2020), 5,665 mg (9/9/2020), 5,665 mg (9/23/2020), 5,665 mg (10/6/2020), 5,665 mg (10/21/2020), 5,665 mg (11/4/2020)  leucovorin calcium 900 mg in dextrose 5 % 250 mL infusion, 945 mg, Intravenous, Clinic/[facility] 1 time, 14 of 14 cycles  Administration: 900 mg (5/6/2020), 900 mg (5/20/2020), 900 mg (6/3/2020), 900 mg (6/17/2020), 945 mg (6/30/2020), 945 mg (7/20/2020), 945 mg (7/29/2020), 945 mg (8/12/2020), 945 mg (8/26/2020), 945 mg (9/9/2020), 945 mg (9/23/2020), 945 mg (10/6/2020), 945 mg (10/21/2020), 945 mg (11/4/2020)  oxaliplatin (ELOXATIN) 200 mg in dextrose 5 % 500 mL chemo infusion, 201 mg, Intravenous, Clinic/[facility] 1 time, 6 of 6 cycles  Dose modification: 65 mg/m2 (original dose 85 mg/m2, Cycle 6)  Administration: 200 mg (5/6/2020), 200 mg (5/20/2020), 200 mg (6/3/2020), 200 mg (6/17/2020), 201 mg (6/30/2020), 150 mg (7/20/2020)     5/3/2021 Cancer Staged    Staging form: Colon and Rectum, AJCC 8th Edition  - Clinical stage from 5/3/2021: Stage Unknown (rcT0, cNX, cM1)     6/16/2021 - 8/2/2023 Chemotherapy    Treatment Summary   Plan Name: OP COLORECTAL FOLFIRI + BEVACIZUMAB Q2W  Treatment Goal: Control  Status: Inactive  Start Date: 6/16/2021  End Date: 8/2/2023  Provider: Marah Santo MD  Chemotherapy: fluorouraciL injection 990 mg, 400 mg/m2 = 990 mg, Intravenous, Clinic/[facility] 1 time, 15 of 15 cycles  Administration: 990 mg (6/16/2021), 990 mg (6/30/2021), 990 mg (7/14/2021), 980 mg (7/28/2021), 990 mg (8/11/2021), 990 mg (8/25/2021), 990 mg (9/8/2021), 990 mg  (9/22/2021), 990 mg (10/6/2021), 990 mg (10/20/2021), 990 mg (11/3/2021), 990 mg (12/1/2021), 990 mg (12/15/2021), 990 mg (11/17/2021), 990 mg (12/28/2021)  fluorouraciL 2,400 mg/m2 = 5,950 mg in sodium chloride 0.9% 240 mL chemo infusion, 2,400 mg/m2 = 5,950 mg, Intravenous, Over 46 hours, 43 of 46 cycles  Administration: 5,950 mg (6/16/2021), 5,950 mg (6/30/2021), 5,950 mg (7/14/2021), 5,880 mg (7/28/2021), 5,930 mg (8/11/2021), 5,930 mg (8/25/2021), 5,930 mg (9/8/2021), 5,930 mg (9/22/2021), 5,930 mg (10/6/2021), 5,930 mg (10/20/2021), 5,930 mg (11/3/2021), 5,930 mg (12/1/2021), 5,930 mg (12/15/2021), 5,930 mg (11/17/2021), 5,930 mg (12/28/2021), 6,050 mg (5/11/2022), 6,025 mg (3/9/2022), 6,025 mg (2/23/2022), 5,930 mg (2/2/2022), 5,930 mg (1/19/2022), 6,050 mg (4/20/2022), 6,025 mg (4/6/2022), 6,025 mg (3/23/2022), 6,050 mg (6/1/2022), 6,050 mg (6/15/2022), 6,050 mg (10/19/2022), 6,050 mg (10/5/2022), 6,050 mg (11/2/2022), 6,050 mg (11/16/2022), 6,050 mg (11/30/2022), 6,050 mg (1/4/2023), 6,050 mg (12/19/2022), 6,050 mg (1/18/2023), 6,000 mg (5/22/2023), 6,000 mg (4/26/2023), 6,000 mg (4/11/2023), 6,000 mg (2/28/2023), 6,050 mg (2/14/2023), 6,000 mg (2/1/2023), 6,000 mg (7/5/2023), 6,000 mg (6/19/2023), 6,000 mg (6/5/2023), 6,000 mg (7/31/2023)  bevacizumab (AVASTIN) 600 mg in sodium chloride 0.9% 100 mL chemo infusion, 660 mg, Intravenous, [facility] 1 time, 36 of 36 cycles  Dose modification: 5 mg/kg (original dose 5 mg/kg, Cycle 26, Reason: MD Discretion, Comment: 5 mg/kg original dose)  Administration: 600 mg (6/30/2021), 600 mg (7/14/2021), 600 mg (7/28/2021), 600 mg (6/16/2021), 600 mg (8/11/2021), 655 mg (8/25/2021), 600 mg (9/8/2021), 600 mg (9/22/2021), 600 mg (10/6/2021), 600 mg (10/20/2021), 600 mg (11/3/2021), 655 mg (12/1/2021), 600 mg (12/15/2021), 600 mg (11/17/2021), 600 mg (12/28/2021), 600 mg (5/11/2022), 600 mg (3/9/2022), 600 mg (2/23/2022), 600 mg (2/2/2022), 600 mg (1/19/2022), 600 mg  (4/20/2022), 680 mg (4/6/2022), 600 mg (3/23/2022), 680 mg (10/5/2022), 680 mg (10/19/2022), 680 mg (11/2/2022), 680 mg (11/16/2022), 680 mg (11/30/2022), 680 mg (1/4/2023), 680 mg (12/19/2022), 680 mg (1/18/2023), 680 mg (3/28/2023), 680 mg (3/14/2023), 680 mg (2/28/2023), 680 mg (2/14/2023), 680 mg (2/1/2023)  irinotecan (CAMPTOSAR) 440 mg in sodium chloride 0.9% 500 mL chemo infusion, 446 mg, Intravenous, Clinic/[facility] 1 time, 45 of 48 cycles  Dose modification: 180 mg/m2 (original dose 180 mg/m2, Cycle 24)  Administration: 440 mg (6/16/2021), 440 mg (6/30/2021), 440 mg (7/14/2021), 440 mg (7/28/2021), 440 mg (8/11/2021), 444 mg (8/25/2021), 440 mg (9/8/2021), 440 mg (9/22/2021), 440 mg (10/6/2021), 440 mg (10/20/2021), 440 mg (11/3/2021), 440 mg (12/1/2021), 440 mg (12/15/2021), 440 mg (11/17/2021), 440 mg (12/28/2021), 440 mg (5/11/2022), 440 mg (3/9/2022), 440 mg (2/23/2022), 440 mg (2/2/2022), 440 mg (1/19/2022), 440 mg (4/20/2022), 440 mg (4/6/2022), 440 mg (3/24/2022), 440 mg (6/1/2022), 440 mg (6/15/2022), 440 mg (10/19/2022), 440 mg (10/5/2022), 440 mg (11/2/2022), 440 mg (11/16/2022), 440 mg (11/30/2022), 440 mg (1/4/2023), 440 mg (12/19/2022), 440 mg (1/18/2023), 440 mg (5/22/2023), 440 mg (4/26/2023), 440 mg (4/11/2023), 440 mg (3/28/2023), 440 mg (3/14/2023), 440 mg (2/28/2023), 440 mg (2/14/2023), 440 mg (2/1/2023), 440 mg (7/5/2023), 440 mg (6/19/2023), 440 mg (6/5/2023), 440 mg (7/31/2023)  bevacizumab-awwb (MVASI) 5 mg/kg = 680 mg in sodium chloride 0.9% 100 mL infusion, 5 mg/kg = 680 mg (100 % of original dose 5 mg/kg), Intravenous, Clinic/[facility] 1 time, 7 of 10 cycles  Dose modification: 5 mg/kg (original dose 5 mg/kg, Cycle 39)  Administration: 680 mg (4/11/2023), 680 mg (4/26/2023), 680 mg (5/22/2023), 680 mg (7/5/2023), 680 mg (6/19/2023), 680 mg (6/5/2023), 680 mg (7/31/2023)     8/17/2023 - 8/18/2023 Chemotherapy    Treatment Summary   Plan Name: OP CETUXIMAB 500MG Q2W  Treatment Goal:  Control  Status: Inactive  Start Date:   End Date:   Provider: Marha Santo MD  Chemotherapy: [No matching medication found in this treatment plan]     8/24/2023 -  Chemotherapy    Treatment Summary   Plan Name: [facility] - ARM 1 ENCORAFENIB  CETUXIMAB NIVOLUMAB  Treatment Goal: Palliative  Status: Active  Start Date: 8/24/2023  End Date: 7/12/2024 (Planned)  Provider: Marah Santo MD  Chemotherapy: cetuximab (ERBITUX) 100 mg/50 mL chemo infusion 1,200 mg, 1,230 mg, Intravenous, Clinic/[facility] 1 time, 1 of 12 cycles  Administration: 1,200 mg (8/24/2023)  encorafenib 75 mg Cap, 300 mg, Oral, Daily, 1 of 1 cycle, Start date: --, End date: --     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   4/3/2020 Initial Diagnosis    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     5/6/2020 - 11/17/2020 Chemotherapy    Treatment Summary   Plan Name: OP FOLFOX 6 Q2W  Treatment Goal: Curative  Status: Inactive  Start Date: 5/6/2020  End Date: 11/6/2020  Provider: Dylan Leyva MD  Chemotherapy: fluorouraciL injection 945 mg, 400 mg/m2 = 945 mg, Intravenous, Clinic/[facility] 1 time, 14 of 14 cycles  Administration: 945 mg (5/6/2020), 945 mg (5/20/2020), 945 mg (6/3/2020), 945 mg (6/17/2020), 945 mg (7/29/2020), 945 mg (8/12/2020), 945 mg (8/26/2020), 945 mg (9/9/2020), 945 mg (9/23/2020), 945 mg (10/6/2020), 945 mg (10/21/2020), 945 mg (11/4/2020)  fluorouraciL 2,400 mg/m2 = 5,665 mg in sodium chloride 0.9% 240 mL chemo infusion, 2,400 mg/m2 = 5,665 mg, Intravenous, Over 46 hours, 14 of 14 cycles  Administration: 5,665 mg (5/6/2020), 5,665 mg (5/20/2020), 5,665 mg (6/3/2020), 5,665 mg (6/17/2020), 5,665 mg (7/29/2020), 5,665 mg (8/12/2020), 5,665 mg (8/26/2020), 5,665 mg (9/9/2020), 5,665 mg (9/23/2020), 5,665 mg (10/6/2020), 5,665 mg (10/21/2020), 5,665 mg (11/4/2020)  leucovorin calcium 900 mg in dextrose 5 % 250 mL infusion, 945 mg, Intravenous, Clinic/[facility] 1 time, 14 of 14 cycles  Administration: 900 mg (5/6/2020), 900 mg (5/20/2020), 900 mg  (6/3/2020), 900 mg (6/17/2020), 945 mg (6/30/2020), 945 mg (7/20/2020), 945 mg (7/29/2020), 945 mg (8/12/2020), 945 mg (8/26/2020), 945 mg (9/9/2020), 945 mg (9/23/2020), 945 mg (10/6/2020), 945 mg (10/21/2020), 945 mg (11/4/2020)  oxaliplatin (ELOXATIN) 200 mg in dextrose 5 % 500 mL chemo infusion, 201 mg, Intravenous, Clinic/[facility] 1 time, 6 of 6 cycles  Dose modification: 65 mg/m2 (original dose 85 mg/m2, Cycle 6)  Administration: 200 mg (5/6/2020), 200 mg (5/20/2020), 200 mg (6/3/2020), 200 mg (6/17/2020), 201 mg (6/30/2020), 150 mg (7/20/2020)     6/16/2021 - 8/2/2023 Chemotherapy    Treatment Summary   Plan Name: OP COLORECTAL FOLFIRI + BEVACIZUMAB Q2W  Treatment Goal: Control  Status: Inactive  Start Date: 6/16/2021  End Date: 8/2/2023  Provider: Marah Santo MD  Chemotherapy: fluorouraciL injection 990 mg, 400 mg/m2 = 990 mg, Intravenous, Clinic/[facility] 1 time, 15 of 15 cycles  Administration: 990 mg (6/16/2021), 990 mg (6/30/2021), 990 mg (7/14/2021), 980 mg (7/28/2021), 990 mg (8/11/2021), 990 mg (8/25/2021), 990 mg (9/8/2021), 990 mg (9/22/2021), 990 mg (10/6/2021), 990 mg (10/20/2021), 990 mg (11/3/2021), 990 mg (12/1/2021), 990 mg (12/15/2021), 990 mg (11/17/2021), 990 mg (12/28/2021)  fluorouraciL 2,400 mg/m2 = 5,950 mg in sodium chloride 0.9% 240 mL chemo infusion, 2,400 mg/m2 = 5,950 mg, Intravenous, Over 46 hours, 43 of 46 cycles  Administration: 5,950 mg (6/16/2021), 5,950 mg (6/30/2021), 5,950 mg (7/14/2021), 5,880 mg (7/28/2021), 5,930 mg (8/11/2021), 5,930 mg (8/25/2021), 5,930 mg (9/8/2021), 5,930 mg (9/22/2021), 5,930 mg (10/6/2021), 5,930 mg (10/20/2021), 5,930 mg (11/3/2021), 5,930 mg (12/1/2021), 5,930 mg (12/15/2021), 5,930 mg (11/17/2021), 5,930 mg (12/28/2021), 6,050 mg (5/11/2022), 6,025 mg (3/9/2022), 6,025 mg (2/23/2022), 5,930 mg (2/2/2022), 5,930 mg (1/19/2022), 6,050 mg (4/20/2022), 6,025 mg (4/6/2022), 6,025 mg (3/23/2022), 6,050 mg (6/1/2022), 6,050 mg (6/15/2022), 6,050 mg  (10/19/2022), 6,050 mg (10/5/2022), 6,050 mg (11/2/2022), 6,050 mg (11/16/2022), 6,050 mg (11/30/2022), 6,050 mg (1/4/2023), 6,050 mg (12/19/2022), 6,050 mg (1/18/2023), 6,000 mg (5/22/2023), 6,000 mg (4/26/2023), 6,000 mg (4/11/2023), 6,000 mg (2/28/2023), 6,050 mg (2/14/2023), 6,000 mg (2/1/2023), 6,000 mg (7/5/2023), 6,000 mg (6/19/2023), 6,000 mg (6/5/2023), 6,000 mg (7/31/2023)  bevacizumab (AVASTIN) 600 mg in sodium chloride 0.9% 100 mL chemo infusion, 660 mg, Intravenous, [facility] 1 time, 36 of 36 cycles  Dose modification: 5 mg/kg (original dose 5 mg/kg, Cycle 26, Reason: MD Discretion, Comment: 5 mg/kg original dose)  Administration: 600 mg (6/30/2021), 600 mg (7/14/2021), 600 mg (7/28/2021), 600 mg (6/16/2021), 600 mg (8/11/2021), 655 mg (8/25/2021), 600 mg (9/8/2021), 600 mg (9/22/2021), 600 mg (10/6/2021), 600 mg (10/20/2021), 600 mg (11/3/2021), 655 mg (12/1/2021), 600 mg (12/15/2021), 600 mg (11/17/2021), 600 mg (12/28/2021), 600 mg (5/11/2022), 600 mg (3/9/2022), 600 mg (2/23/2022), 600 mg (2/2/2022), 600 mg (1/19/2022), 600 mg (4/20/2022), 680 mg (4/6/2022), 600 mg (3/23/2022), 680 mg (10/5/2022), 680 mg (10/19/2022), 680 mg (11/2/2022), 680 mg (11/16/2022), 680 mg (11/30/2022), 680 mg (1/4/2023), 680 mg (12/19/2022), 680 mg (1/18/2023), 680 mg (3/28/2023), 680 mg (3/14/2023), 680 mg (2/28/2023), 680 mg (2/14/2023), 680 mg (2/1/2023)  irinotecan (CAMPTOSAR) 440 mg in sodium chloride 0.9% 500 mL chemo infusion, 446 mg, Intravenous, [facility] 1 time, 45 of 48 cycles  Dose modification: 180 mg/m2 (original dose 180 mg/m2, Cycle 24)  Administration: 440 mg (6/16/2021), 440 mg (6/30/2021), 440 mg (7/14/2021), 440 mg (7/28/2021), 440 mg (8/11/2021), 444 mg (8/25/2021), 440 mg (9/8/2021), 440 mg (9/22/2021), 440 mg (10/6/2021), 440 mg (10/20/2021), 440 mg (11/3/2021), 440 mg (12/1/2021), 440 mg (12/15/2021), 440 mg (11/17/2021), 440 mg (12/28/2021), 440 mg (5/11/2022), 440 mg (3/9/2022), 440 mg  (2/23/2022), 440 mg (2/2/2022), 440 mg (1/19/2022), 440 mg (4/20/2022), 440 mg (4/6/2022), 440 mg (3/24/2022), 440 mg (6/1/2022), 440 mg (6/15/2022), 440 mg (10/19/2022), 440 mg (10/5/2022), 440 mg (11/2/2022), 440 mg (11/16/2022), 440 mg (11/30/2022), 440 mg (1/4/2023), 440 mg (12/19/2022), 440 mg (1/18/2023), 440 mg (5/22/2023), 440 mg (4/26/2023), 440 mg (4/11/2023), 440 mg (3/28/2023), 440 mg (3/14/2023), 440 mg (2/28/2023), 440 mg (2/14/2023), 440 mg (2/1/2023), 440 mg (7/5/2023), 440 mg (6/19/2023), 440 mg (6/5/2023), 440 mg (7/31/2023)  bevacizumab-awwb (MVASI) 5 mg/kg = 680 mg in sodium chloride 0.9% 100 mL infusion, 5 mg/kg = 680 mg (100 % of original dose 5 mg/kg), Intravenous, Clinic/[facility] 1 time, 7 of 10 cycles  Dose modification: 5 mg/kg (original dose 5 mg/kg, Cycle 39)  Administration: 680 mg (4/11/2023), 680 mg (4/26/2023), 680 mg (5/22/2023), 680 mg (7/5/2023), 680 mg (6/19/2023), 680 mg (6/5/2023), 680 mg (7/31/2023)     8/17/2023 - 8/18/2023 Chemotherapy    Treatment Summary   Plan Name: OP CETUXIMAB 500MG Q2W  Treatment Goal: Control  Status: Inactive  Start Date:   End Date:   Provider: Marah Santo MD  Chemotherapy: [No matching medication found in this treatment plan]     8/24/2023 -  Chemotherapy    Treatment Summary   Plan Name: [facility] - ARM 1 ENCORAFENIB  CETUXIMAB NIVOLUMAB  Treatment Goal: Palliative  Status: Active  Start Date: 8/24/2023  End Date: 7/12/2024 (Planned)  Provider: Marah Santo MD  Chemotherapy: cetuximab (ERBITUX) 100 mg/50 mL chemo infusion 1,200 mg, 1,230 mg, Intravenous, Clinic/[facility] 1 time, 1 of 12 cycles  Administration: 1,200 mg (8/24/2023)  encorafenib 75 mg Cap, 300 mg, Oral, Daily, 1 of 1 cycle, Start date: --, End date: --         History:  Past Medical History:   Diagnosis Date    Anxiety     Depression     FH: ovarian cancer 3/16/2020    Hx of psychiatric care     Effexor, Paxil, Lexapro, Zoloft, Wellbutrin, Trazodone Buspar    Hyperthyroidism      Hypothyroid     Kidney calculi     Malignant neoplasm of sigmoid colon 3/16/2020    Menorrhagia     Multinodular goiter 2012    Palpitation     Psychiatric problem     Venous insufficiency       Past Surgical History:   Procedure Laterality Date     SECTION, CLASSIC      x3    COLONOSCOPY N/A 2020    Procedure: COLONOSCOPY;  Surgeon: Shane Parker MD;  Location: [facility] ENDO;  Service: Endoscopy;  Laterality: N/A;    COLONOSCOPY N/A 2022    Procedure: COLONOSCOPY;  Surgeon: Shane Parker MD;  Location: [facility] ENDO;  Service: Endoscopy;  Laterality: N/A;    COLONOSCOPY N/A 2023    Procedure: COLONOSCOPY;  Surgeon: Shane Parker MD;  Location: [facility] ENDO;  Service: Endoscopy;  Laterality: N/A;  no cecum anastomosis    CYSTOSCOPY W/ URETERAL STENT PLACEMENT Bilateral 3/25/2020    Procedure: CYSTOSCOPY, WITH URETERAL STENT INSERTION;  Surgeon: Claudio Tyson MD;  Location: [facility] OR [address] [place] [address];  Service: Urology;  Laterality: Bilateral;    INSERTION OF TUNNELED CENTRAL VENOUS CATHETER (CVC) WITH SUBCUTANEOUS PORT N/A 2020    Procedure: [id number];  Surgeon: [facility] Diagnostic Provider;  Location: [facility] OR [address] [place] [address];  Service: Radiology;  Laterality: N/A;  Room 189/[facility]    LAPAROSCOPIC SIGMOIDECTOMY N/A 3/25/2020    Procedure: COLECTOMY, SIGMOID, LAPAROSCOPIC, flex sig, ERAS high;  Surgeon: Silvio Man MD;  Location: [facility] OR [address] [place] [address];  Service: Colon and Rectal;  Laterality: N/A;    MOBILIZATION OF SPLENIC FLEXURE  3/25/2020    Procedure: MOBILIZATION, SPLENIC FLEXURE;  Surgeon: Silvio Man MD;  Location: [facility] OR [address] [place] [address];  Service: Colon and Rectal;;    tonsillectomy      TOTAL ABDOMINAL HYSTERECTOMY W/ BILATERAL SALPINGOOPHORECTOMY N/A 3/25/2020    Procedure: HYSTERECTOMY, TOTAL, ABDOMINAL, WITH BILATERAL SALPINGO-OOPHORECTOMY;  Surgeon: Keron Brady MD;  Location: [facility] OR [address] [place] [address];  Service: Oncology;  Laterality: N/A;     Family History   Problem  Relation Age of Onset    Heart disease Father     Diabetes Mother 65    Drug abuse Brother     Drug abuse Brother     Cancer Maternal Aunt         lung cancer    Cancer Maternal Grandmother         stomach cancer- started in ovaries    Ovarian cancer Maternal Grandmother         stomach cancer- started in ovaries    Colon cancer Paternal Uncle     Cancer Paternal Uncle     Ovarian cancer Maternal Aunt     Ovarian cancer Maternal Aunt     Ovarian cancer Cousin         mother had ovarian cancer    Drug abuse Son     Drug abuse Son         clean/sober since 2012    Colon cancer Son     No Known Problems Daughter     Uterine cancer Neg Hx     Breast cancer Neg Hx      Social History     Tobacco Use    Smoking status: Never    Smokeless tobacco: Never   Substance and Sexual Activity    Alcohol use: Yes     Comment: occasionally- twice monthly    Drug use: Never    Sexual activity: Yes     Partners: Male     Birth control/protection: See Surgical Hx        ROS:   Review of Systems   Constitutional:  Positive for malaise/fatigue (first few days). Negative for fever and weight loss.   HENT:  Negative for congestion, hearing loss, nosebleeds and sore throat.    Eyes:  Negative for double vision and photophobia.   Respiratory:  Negative for cough, hemoptysis, sputum production, shortness of breath and wheezing.    Cardiovascular:  Negative for chest pain, palpitations, orthopnea and leg swelling.   Gastrointestinal:  Positive for constipation (intermittent) and nausea (improving). Negative for abdominal pain, blood in stool, diarrhea, heartburn and vomiting.   Genitourinary:  Negative for dysuria, frequency, hematuria and urgency.   Musculoskeletal:  Negative for back pain, joint pain and myalgias.   Skin:  Positive for rash. Negative for itching.   Neurological:  Negative for dizziness, tingling, seizures, weakness and headaches.   Endo/Heme/Allergies:  Negative for polydipsia. Does not bruise/bleed easily.  "  Psychiatric/Behavioral:  Negative for depression and memory loss. The patient is nervous/anxious. The patient does not have insomnia.         Objective:     Vitals:    09/07/23 1258   BP: 111/77   Pulse: 92   Resp: 16   Temp: 97.8 °F (36.6 °C)   TempSrc: Temporal   SpO2: 96%   Weight: 128.2 kg (282 lb 10.1 oz)   Height: 5' 6" (1.676 m)   PainSc: 0-No pain         Wt Readings from Last 10 Encounters:   09/07/23 128.2 kg (282 lb 10.1 oz)   09/06/23 129.3 kg (285 lb 0.9 oz)   08/24/23 132.1 kg (291 lb 3.6 oz)   08/24/23 132.1 kg (291 lb 3.6 oz)   08/21/23 131.3 kg (289 lb 7.4 oz)   08/14/23 130.3 kg (287 lb 4.2 oz)   07/31/23 129.5 kg (285 lb 7.9 oz)   07/31/23 129.5 kg (285 lb 7.9 oz)   07/07/23 127.8 kg (281 lb 12 oz)   07/05/23 127.8 kg (281 lb 12 oz)       Physical Examination:   Physical Exam  Vitals and nursing note reviewed.   Constitutional:       General: She is not in acute distress.     Appearance: She is not diaphoretic.   HENT:      Head: Normocephalic.      Mouth/Throat:      Pharynx: No oropharyngeal exudate.   Eyes:      General: No scleral icterus.     Conjunctiva/sclera: Conjunctivae normal.   Neck:      Thyroid: No thyromegaly.   Cardiovascular:      Rate and Rhythm: Normal rate and regular rhythm.      Heart sounds: Normal heart sounds. No murmur heard.  Pulmonary:      Effort: Pulmonary effort is normal. No respiratory distress.      Breath sounds: No stridor. No wheezing or rales.   Chest:      Chest wall: No tenderness.   Abdominal:      General: Bowel sounds are normal. There is no distension.      Palpations: Abdomen is soft. There is no mass.      Tenderness: There is no abdominal tenderness. There is no rebound.   Musculoskeletal:         General: No tenderness or deformity. Normal range of motion.      Cervical back: Neck supple.   Lymphadenopathy:      Cervical: No cervical adenopathy.   Skin:     General: Skin is warm and dry.      Findings: Rash (grade 1 facial rash, at baseline) " present. No erythema.   Neurological:      Mental Status: She is alert and oriented to person, place, and time.      Cranial Nerves: No cranial nerve deficit.      Coordination: Coordination normal.      Gait: Gait is intact.   Psychiatric:         Mood and Affect: Affect normal.         Cognition and Memory: Memory normal.         Judgment: Judgment normal.          Diagnostic Tests:  Significant Imaging: I have reviewed and interpreted all pertinent imaging results/findings.  Laboratory Data:  All pertinent labs have been reviewed.    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 Oxaliplatin was stopped due to severe adverse reaction.     Follow-up CTs and PET in May 2021 showed enlarging retroperitoneal node, concerning for metastatic disease.      She started FOLFIRI + Avastin and has continued till July 2022.   Given isolated RP toni disease, she has undergone open Left periaortic and aortocaval lymph node dissection on 7/12/2022. Resumed FOLFIRI+ Debo with relatively stable disease but now has disease progression.     She has been enrolled into  SWOG 2107 (encorafenib/cetuximab + nivo) , cleared for cycle 1, D15  today.   Monitor closely. Follow up per research protocol.     Grade 1 dermatitis   Noted, initially started topical hydrocortisone  and clindamycin gel, now on oral doxycycline.     Neoplasm pain  Improving. Follow up with palliative care.     Hydronephrosis   Follow up with Urology for co management. No acute intervention was recommended.     Nausea   Continue compazine. Continue sacuso patch as needed.     Constipation  Start sennakot and daily Miralax.     Transaminitis  Fluctuates.  Continue to monitor. No obvious liver mets.     Hypercalcemia   Mild, secondary to hyperparathyroidism.  Avoid calcium supplements.     Hypothyroidism  Multinodular goiter   Continue Levothyroxine.  Had a non diagnostic biopsy in 2012, usg findings have remained stable. Reviewed repeat thyroid US, stable, will follow up with Endocrine.     Essential HTN   Continue antihypertensives.      Anxiety   Continue to  follow up with Onc psych.    MDM includes  :    - Acute or chronic illness or injury that poses a threat to life or bodily function  - Independent review and explanation of 3+ results from unique tests  - Discussion of management and ordering 3+ unique tests  - Extensive discussion of treatment and management  - Prescription drug management  - Drug therapy requiring intensive monitoring for toxicity          ECOG SCORE               Discussion:   No follow-ups on file.    Plan was discussed with the patient at length, and she verbalized understanding. Gail was given an opportunity to ask questions that were answered to her satisfaction, and she was advised to call in the interval if any problems or questions arise.    Electronically signed by Marah Santo MD        Route Chart for Scheduling    Med Onc Chart Routing      Follow up with physician . 10/19 4 pm in person visit   Follow up with TAVO . 10/5   Infusion scheduling note    Injection scheduling note    Labs    Imaging    Pharmacy appointment    Other referrals              Treatment Plan Information   [facility] - ARM 1 ENCORAFENIB  CETUXIMAB NIVOLUMAB   Marah Santo MD   Upcoming Treatment  Dates - [facility] - ARM 1 ENCORAFENIB  CETUXIMAB NIVOLUMAB    9/8/2023       Pre-Medications       acetaminophen tablet 650 mg       diphenhydrAMINE (BENADRYL) 50 mg in NS 50 mL IVPB       hydrocortisone sodium succinate injection 50 mg       Chemotherapy       cetuximab (ERBITUX) 100 mg/50 mL chemo infusion 1,230 mg  9/22/2023       Pre-Medications       acetaminophen tablet 650 mg       diphenhydrAMINE (BENADRYL) 50 mg in NS 50 mL IVPB       hydrocortisone sodium succinate injection 50 mg       Chemotherapy       cetuximab (ERBITUX) 100 mg/50 mL chemo infusion 1,230 mg       INV nivolumab 480 mg in INV sodium chloride 0.9 % 100 mL chemo infusion  10/6/2023       Pre-Medications       acetaminophen tablet 650 mg       diphenhydrAMINE (BENADRYL) 50 mg in NS 50 mL IVPB       hydrocortisone sodium succinate injection 50 mg       Chemotherapy       cetuximab (ERBITUX) 100 mg/50 mL chemo infusion 1,230 mg  10/20/2023       Pre-Medications       diphenhydrAMINE (BENADRYL) 50 mg in NS 50 mL IVPB       acetaminophen tablet 650 mg       hydrocortisone sodium succinate injection 50 mg       Chemotherapy       cetuximab (ERBITUX) 100 mg/50 mL chemo infusion 1,230 mg       INV nivolumab 480 mg in INV sodium chloride 0.9 % 100 mL chemo infusion    Supportive Plan Information  IV FLUIDS AND ELECTROLYTES   Brayden Solo MD   Upcoming Treatment Dates - IV FLUIDS AND ELECTROLYTES    No upcoming days in selected categories.    Therapy Plan Information  PORT FLUSH  Flushes  heparin, porcine (PF) 100 unit/mL injection flush 500 Units  500 Units, Intravenous, Every visit  sodium chloride 0.9% flush 10 mL  10 mL, Intravenous, Every visit    Answers submitted by the patient for this visit:  Review of Systems Questionnaire (Submitted on 9/4/2023)  appetite change : No  unexpected weight change: No  mouth sores: No  visual disturbance: No  adenopathy: No

## 2023-09-07 NOTE — PROGRESS NOTES
Gail Mckinnon, MRN [id number]  Protocol: SWOG   IRB#: 2023.047  : NGUYEN Degroot MD  Treating Investigator: JESUS Santo MD     Randomized Phase II Trial of Encorafenib and Cetuximab with or Without Nivolumab (NSC #[id number]) for Patients with Previously Treated, Microsatellite Stable BRAF V600E Metastatic and/or Unresectable Colorectal Cancer      Cycle 1 Day 15 Arm 1/ Nivolumab + Cetuximab + Encorafenib therapy.     07SEP2023     PID# [id number]     Cycle 1 Day 15  Cetuximab only    Note: Day 15 will be administered on 08SEP2023 (Day 16) related to extended infusion time resulting from C1D1 Grade II Infusion Reaction.    The patient presented to the planned office visit alone. The patient was alert, oriented, without noted distress, with appropriate mood and affect for the situation, and freely agreed to continue with participating in the referenced study.     06SEP2023 Labs include:  CBC w/ diff, CMP, Magnesium.   No TSH drawn prior to Day 15 Erbitux single agent. *Awaiting communication from study chair regarding TSH monitoring requirements for Arm 1 patients (receiving Nivolumab).  Baseline Fatigue- Grade 1- pt reports continues but seems somewhat improved.   Baseline Hypercalcemia-Grade 1 continues- corrected calcium calculation attached.  Baseline Arthralgia Grade 1 Start date ~ March 2023 added to baseline list- pt. denies worsening arthralgia since cycle day 1; see ConMed section below.    21AUG2023 TSH- elevated @ 8.7uIU/ml [0.400-4.000 uIU/mL] Baseline drawn r/t patient's diagnosis of hypothyroidism and planned Nivolumab; T4, Free @ 0.99 ng/dL [0.71- 1.51 ng/dL]  PTH elevated @ 217.8 pg/mL [9.0-77.0 pg/mL] Baseline drawn r/t hypercalcemia (see corrected calcium source doc) and history of elevated PTH 9/2021 and diagnosis of Multinodular Goiter.              Corrected calcium calculated as 11.00 mg/dL.  As of 21AUG2023, the patients endocrinologist increased dose from 200mcg Daily to 224 mcg Daily  with a repeat TSH, Renal Panel, PTH, Vit D and 24 hr urine Ca/Cr in 6 weeks (~ October 5th, 2023).   See printed source docs under C1 D1 CRC note.  The treating MD is aware of Dr. Hollis (endocrinologist) plan of Synthroid dose increase and planned follow up and deems the patients asymptomatic Baseline Grade II Hypothyroidism as not CS- no new orders given.   Reviewed Section 8.3.1 Nivolumab dose mods with treating MD. The Grade II Hypothyroidism was present at Baseline, has not increased in grade, and the patient is asymptomatic per the treating MD's PE. The patient denies any persistent headaches or visual changes.     ConMed list reviewed with the patient for accuracy- see shadow chart for changes.   The patient admits to starting the Endocrinologist prescribed Synthroid 200mcg daily on 23Aug2023.    Patient reports starting Duloxetine 60mg on 9/7/2023, prescribed by Palliative Care DO, for Arthralgias that she reports having experienced since ~ March 2023.    Patient admits to being compliant with QD dosing of encorafenib and the use of the protocols pill diary. It was reviewed with the patient that the planned 28-day cycle would equal #112 capsules, and that she would bring the remaining capsules back at the end of the cycle for disposal by the [facility] pharmacy.   Last dose of Encorafenib from first supply is expected to take place on Wednesday, September 20th, 2023. Pt. acknowledge that she will call if no refill by [facility] pharmacy by early next week.  The patient verbalized understanding to take Encorafenib #4 capsule with water in the morning at the same time each day. Pt. also agreed to taking before leaving home on the days of infusional therapy to provide one hour time frame before receiving oral pre-medications.    AEs:   Infusion Related Reaction- Grade 2-Start date 8/24/2023 End date 8/28/2023. All associated symptoms resolved- see 8/28/2023 LAVINIA Grace/CRC note. Per protocol Section 7 / 7.1 &  "Table 3 The treating MD chose to add hydrocortisone 50mg IV to Acetaminophen and Diphenhydramine premeds and increase infusion time of Cetuximab to four hours followed by a period of observation.   Elevated BP readings during Day 1 vitals noted by the treating MD but does not deem to add AE of hypertension. Pt reports BP readings at home WNL.   Chills/Rigors- Grade 1 Start date 8/24/2023- Ended 8/24/2023 No temperatures taken while experiencing chills/rigors. Thermometer obtained on 8/26/2023 without elevated temperature since that time. The treating MD is aware incorporated hydrocortisone premed and prolonged infusion time to Day 15 treatment.   Nausea- Grade 1 Start date 8/24/2023-Ended 8/26/2023 Nausea responded to PRN Compazine and Zofran. The treating MD is aware does not deem as CS; no new orders given.  Headache -Grade 1 Start date 8/24/2023- Ended 8/27/2023 The patient reported as intermittent and denies any headache since Sunday 8/27/2023. Pt took Tylenol PRN- last dose 8/27/2023. The treating MD is aware, does not deem as CS, no new orders given.              Pt. Reports that her BP readings taken at home have been "within normal" ranges, such as 120's/60's.   Vomiting- Grade 1 Start date 8/24/2023- Ended 8/24/2023 One dose of Zofran IV at infusion and then PRN home meds for nausea consisted of Zofran and Compazine. The treating MD is aware, does not deem as CS, no new orders given. Pt instructed on taking routine po Zofran time first 24 hours s/p infusion.   Rash-Maculo-papular Grade 1 Start date 8/28/2023- patient reports rash started at base of her nose with additional new pimples appearing on other areas of her face. She began applying clindamycin gel as prescribed. Doxycycline started 06SEP2023 for sporadic areas of pruritic rash. Mild soaps, temped water, and moisturizers recommended. Erbitux skin care kit provided to patient as packaged from [employer].         Baseline Rash- Grade 1 Facial redness/rash " "ended 8/27/2023.   Baseline Abdominal Pain-Grade 1 The patient reports that she has not had to take any Rx pain medication for her BASELINE Abdominal pain since 8/27/2023. If pain continues to stay resolved at C2 Day 1 will discuss with the treating MD regarding ending on the baseline AE log with the date of 8/27/2023.    /77 Pulse 92 Temp 97.8 Resp 16   Ht 5' 6" (1.676 m) Wt. 128.2 kg (282 lb 10.1 oz) SpO2 96% BSA 2.47 m²      Zubrod PS: One    Dr. Santo assessed all labs, VS & any PE findings as either WNL or NCS and deemed the patient eligible to received C1 D15 Erbitux with   Baseline BSA 2.46 m2 based on Screening: Ht. 5'6 and 287.26 lbs. (130.3 kg)       Treatment Dosing for Day 15:  The treating MD chose to follow the protocol recommendations regarding Day 15 treatment for the patient's safety related to previous reaction attributed to C1D1 Cetuximab:  Infusion time extended per protocol Cetuximab-Related Infusion Reactions (Table 3).  Hydrocortisone 50mg IV added to premedication of Acetaminophen and Diphenhydramine. Protocol Section 7.0 / 7.1     Cetuximab 500 mg/m2 x 2.46 m2 BSA = 1230 mg over 4 hours- Round dose via pharmacy policy resulted in a dose of 1200mg.  Per protocol, no Nivolumab given on Day 15.      This CRC reviewed upcoming appointments with the patient, and she was encouraged to call with any new symptoms or issues, the patient verbalized understanding. Patient made aware to call after hour number, that she agrees to having, if after business hours or over the weekend.     The patient denied any questions at the end of the visit and ambulated out of the office.    Pharmacogenomic panel resulted:    Intermediate Metabolizer-reviewed with the treating MD without any changes to dosing.   DPYD- Normal metabolizer     Protocol Bio-specimens collected by the phlebotomist with this CRC present at the routine venipuncture for safety labs on 06SEP2023.   Two patient identifiers used prior to " collection. Specimens shipped 06Sep2023 (see shadow chart).       Cycle 2 Day 1:     [id number] 1st floor lab @ 10:30   [id number] OV with Dr. Santo @ 1320 (CRC Kamla Bocanegra will see patient)  22SEP2023 Infusion @ 0830    Addendum made to correct Cycle number at the end of this CRC note, and update the change of lab appt. date./carmenk 9/15/2023 at 11:19 a.m.       DAYDAY Adams RN

## 2023-09-08 ENCOUNTER — INFUSION (OUTPATIENT)
Dept: INFUSION THERAPY | Facility: HOSPITAL | Age: 53
End: 2023-09-08
Attending: INTERNAL MEDICINE
Payer: MEDICAID

## 2023-09-08 ENCOUNTER — DOCUMENTATION ONLY (OUTPATIENT)
Dept: INFUSION THERAPY | Facility: HOSPITAL | Age: 53
End: 2023-09-08
Payer: MEDICAID

## 2023-09-08 ENCOUNTER — TELEPHONE (OUTPATIENT)
Dept: HEMATOLOGY/ONCOLOGY | Facility: CLINIC | Age: 53
End: 2023-09-08
Payer: MEDICAID

## 2023-09-08 VITALS
SYSTOLIC BLOOD PRESSURE: 125 MMHG | HEART RATE: 94 BPM | BODY MASS INDEX: 45.42 KG/M2 | RESPIRATION RATE: 16 BRPM | TEMPERATURE: 98 F | WEIGHT: 282.63 LBS | HEIGHT: 66 IN | DIASTOLIC BLOOD PRESSURE: 66 MMHG

## 2023-09-08 DIAGNOSIS — C77.2 METASTASIS TO INTESTINAL LYMPH NODE: Primary | ICD-10-CM

## 2023-09-08 DIAGNOSIS — C18.7 MALIGNANT NEOPLASM OF SIGMOID COLON: ICD-10-CM

## 2023-09-08 PROCEDURE — 25000003 PHARM REV CODE 250: Mod: PN | Performed by: INTERNAL MEDICINE

## 2023-09-08 PROCEDURE — 96375 TX/PRO/DX INJ NEW DRUG ADDON: CPT | Mod: PN

## 2023-09-08 PROCEDURE — 96367 TX/PROPH/DG ADDL SEQ IV INF: CPT | Mod: PN

## 2023-09-08 PROCEDURE — 96415 CHEMO IV INFUSION ADDL HR: CPT | Mod: PN

## 2023-09-08 PROCEDURE — 63600175 PHARM REV CODE 636 W HCPCS: Mod: PN | Performed by: INTERNAL MEDICINE

## 2023-09-08 PROCEDURE — 96413 CHEMO IV INFUSION 1 HR: CPT | Mod: PN

## 2023-09-08 RX ORDER — DIPHENHYDRAMINE HYDROCHLORIDE 50 MG/ML
50 INJECTION INTRAMUSCULAR; INTRAVENOUS ONCE AS NEEDED
Status: DISCONTINUED | OUTPATIENT
Start: 2023-09-08 | End: 2023-09-08 | Stop reason: HOSPADM

## 2023-09-08 RX ORDER — ACETAMINOPHEN 325 MG/1
650 TABLET ORAL
Status: COMPLETED | OUTPATIENT
Start: 2023-09-08 | End: 2023-09-08

## 2023-09-08 RX ORDER — HEPARIN 100 UNIT/ML
500 SYRINGE INTRAVENOUS
Status: DISCONTINUED | OUTPATIENT
Start: 2023-09-08 | End: 2023-09-08 | Stop reason: HOSPADM

## 2023-09-08 RX ORDER — EPINEPHRINE 0.3 MG/.3ML
0.3 INJECTION SUBCUTANEOUS ONCE AS NEEDED
Status: DISCONTINUED | OUTPATIENT
Start: 2023-09-08 | End: 2023-09-08 | Stop reason: HOSPADM

## 2023-09-08 RX ADMIN — CETUXIMAB 1200 MG: 2 SOLUTION INTRAVENOUS at 11:09

## 2023-09-08 RX ADMIN — SODIUM CHLORIDE: 9 INJECTION, SOLUTION INTRAVENOUS at 10:09

## 2023-09-08 RX ADMIN — ACETAMINOPHEN 650 MG: 325 TABLET ORAL at 10:09

## 2023-09-08 RX ADMIN — DIPHENHYDRAMINE HYDROCHLORIDE 50 MG: 50 INJECTION, SOLUTION INTRAMUSCULAR; INTRAVENOUS at 10:09

## 2023-09-08 RX ADMIN — HYDROCORTISONE SODIUM SUCCINATE 50 MG: 100 INJECTION, POWDER, FOR SOLUTION INTRAMUSCULAR; INTRAVENOUS at 10:09

## 2023-09-08 RX ADMIN — Medication 500 UNITS: at 05:09

## 2023-09-08 NOTE — PROGRESS NOTES
Oncology   Chemotherapy Infusion Visit    Quick Social Service Status Follow Up   Met w/ pt briefly to follow up on social and emotional needs.  SW met with pt. She reports she had problems with her printer and was unable to print out an application for [place] [employer] ([employer]). SW printed out application and helped her complete her section of the forms. Pt will still need to have landlord/ complete section A. She will also need to gather supporting documentation before she can submit the request for aid. Request 3 month rent for September, October and November.     Pt shared with SW how she has been doing since starting on her new trial. She said it is a little scary to do a trail but said the staff, Prashant has been so helpful and supportive. She said since starting she has no longer had pain in her lower abdomen that she as experiencing for month. She is hoping this is a sing that the treatment is helping. SW listened, validated feelings and provided emotional support.        Radha Giordano, MEDINA  09/08/2023  2:59 PM

## 2023-09-08 NOTE — PLAN OF CARE
Pt tolerated tx well. No signs of infusion reaction noted. AVS given to pt Pt educated to call Dr with any issues. Pt verbalized understanding. Pt adequate for discharge.   \   Information: Selecting Yes will display possible errors in your note based on the variables you have selected. This validation is only offered as a suggestion for you. PLEASE NOTE THAT THE VALIDATION TEXT WILL BE REMOVED WHEN YOU FINALIZE YOUR NOTE. IF YOU WANT TO FAX A PRELIMINARY NOTE YOU WILL NEED TO TOGGLE THIS TO 'NO' IF YOU DO NOT WANT IT IN YOUR FAXED NOTE.

## 2023-09-08 NOTE — TELEPHONE ENCOUNTER
----- Message from Luci Alonso MA sent at 9/7/2023  1:31 PM CDT -----  Regarding: Ambulatory referral/consult to Hematology/Oncology/Psychology  Ambulatory referral/consult to Hematology/Oncology/Psychology    THANK YOU!
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
## 2023-09-08 NOTE — PLAN OF CARE
Pt arrived to clinic today for Erbitux infusion and tolerated well. No changes throughout therapy. Pt aware of follow up appointments and side effects of drugs. Pt waited 1 hour post infusion to monitor for s/s of reaction. Discharged to home. NAD.

## 2023-09-11 DIAGNOSIS — C18.7 MALIGNANT NEOPLASM OF SIGMOID COLON: ICD-10-CM

## 2023-09-11 DIAGNOSIS — Z00.6 EXAMINATION OF PARTICIPANT IN CLINICAL TRIAL: ICD-10-CM

## 2023-09-14 ENCOUNTER — PATIENT MESSAGE (OUTPATIENT)
Dept: ADMINISTRATIVE | Facility: OTHER | Age: 53
End: 2023-09-14
Payer: MEDICAID

## 2023-09-14 ENCOUNTER — RESEARCH ENCOUNTER (OUTPATIENT)
Dept: RESEARCH | Facility: HOSPITAL | Age: 53
End: 2023-09-14
Payer: MEDICAID

## 2023-09-14 DIAGNOSIS — G47.00 INSOMNIA, UNSPECIFIED TYPE: ICD-10-CM

## 2023-09-14 NOTE — PROGRESS NOTES
Gail Mckinnon MRN [id number]    Protocol: SWOG   IRB#: [id number]  : NGUYEN Degroot MD  Treating Investigator: JESUS Santo MD    PID# [id number]    Randomized Phase II Trial of Encorafenib and Cetuximab with or Without Nivolumab ([facility] #[id number]) for Patients with Previously Treated, Microsatellite Stable BRAF V600E Metastatic and/or Unresectable Colorectal Cancer   Cycle 1 Day 15 Arm 1/ Nivolumab + Cetuximab + Encorafenib therapy.    [id number]    C1 Day 22 Phone call to patient.    This CRC phoned the patient today to assess if she experienced any delayed infusion reaction to Day 15 Erbitux. The patient sounded alert, oriented, and answered questions appropriately.     The patient denied experiencing chills/rigors, nausea, vomiting, or headache during or after the [id number] infusion. Gail does admit to continued baseline fatigue that does not limit her ADLs; she reports intermittent rest periods helps. She also admits that her reported baseline abdominal pain has not returned at this time.    Gail reports her Grade 1 Rash continues but denies any increase in amount or severity. She also admits to taking Doxycycline and applying clindamycin gel as prescribed.    The patient reports taking and recording daily doses of Encorafenib. Dosing was reviewed with the patient, including her continuing to take daily doses out of initial supply (#120 dispensed) through C1 Day 29 (9/21/2023), which is the day of safety labs and office visit before C2 D1 on 9/22/2023). The patient reports that the speciality pharmacy has been in contact with her and her 2nd shipment of Encorafenib is being shipped.    C1 D1 --> [id number], Treatment Day 15--> [id number] was delayed by one day and administered on Friday, 08SEP2023, due to the infusion center needing to reschedule the appointment due to increased infusion time. See 07SEP2023 CRC note.    Upcoming safety labs and office visit: Thursday 21 SEP 2023 (C 1 D 29)  Cycle 2 Day 22  SEP 2023    CHANDRIKA Adams RN

## 2023-09-15 ENCOUNTER — PATIENT MESSAGE (OUTPATIENT)
Dept: HEMATOLOGY/ONCOLOGY | Facility: CLINIC | Age: 53
End: 2023-09-15
Payer: MEDICAID

## 2023-09-15 ENCOUNTER — PATIENT MESSAGE (OUTPATIENT)
Dept: ADMINISTRATIVE | Facility: OTHER | Age: 53
End: 2023-09-15
Payer: MEDICAID

## 2023-09-15 DIAGNOSIS — G47.00 INSOMNIA, UNSPECIFIED TYPE: ICD-10-CM

## 2023-09-15 RX ORDER — TRAZODONE HYDROCHLORIDE 50 MG/1
50 TABLET ORAL NIGHTLY
Qty: 30 TABLET | Refills: 2 | OUTPATIENT
Start: 2023-09-15 | End: 2024-09-14

## 2023-09-15 RX ORDER — TRAZODONE HYDROCHLORIDE 50 MG/1
50 TABLET ORAL NIGHTLY
Qty: 30 TABLET | Refills: 2 | Status: SHIPPED | OUTPATIENT
Start: 2023-09-15 | End: 2023-09-27

## 2023-09-20 ENCOUNTER — TELEPHONE (OUTPATIENT)
Dept: HEMATOLOGY/ONCOLOGY | Facility: CLINIC | Age: 53
End: 2023-09-20
Payer: MEDICAID

## 2023-09-20 ENCOUNTER — PATIENT MESSAGE (OUTPATIENT)
Dept: ADMINISTRATIVE | Facility: OTHER | Age: 53
End: 2023-09-20
Payer: MEDICAID

## 2023-09-20 DIAGNOSIS — C18.7 MALIGNANT NEOPLASM OF SIGMOID COLON: Primary | ICD-10-CM

## 2023-09-20 DIAGNOSIS — Z00.6 EXAMINATION OF PARTICIPANT IN CLINICAL TRIAL: ICD-10-CM

## 2023-09-20 NOTE — TELEPHONE ENCOUNTER
Pt request to reschedule today's appt, do to transportation.  Pt resched on Oct 5th, confirmed the appt date time and location.

## 2023-09-21 ENCOUNTER — PATIENT MESSAGE (OUTPATIENT)
Dept: HEMATOLOGY/ONCOLOGY | Facility: CLINIC | Age: 53
End: 2023-09-21
Payer: MEDICAID

## 2023-09-21 ENCOUNTER — LAB VISIT (OUTPATIENT)
Dept: LAB | Facility: HOSPITAL | Age: 53
End: 2023-09-21
Attending: INTERNAL MEDICINE
Payer: MEDICAID

## 2023-09-21 ENCOUNTER — RESEARCH ENCOUNTER (OUTPATIENT)
Dept: HEMATOLOGY/ONCOLOGY | Facility: CLINIC | Age: 53
End: 2023-09-21
Payer: MEDICAID

## 2023-09-21 ENCOUNTER — OFFICE VISIT (OUTPATIENT)
Dept: HEMATOLOGY/ONCOLOGY | Facility: CLINIC | Age: 53
End: 2023-09-21
Payer: MEDICAID

## 2023-09-21 ENCOUNTER — DOCUMENTATION ONLY (OUTPATIENT)
Dept: INFUSION THERAPY | Facility: HOSPITAL | Age: 53
End: 2023-09-21
Payer: MEDICAID

## 2023-09-21 VITALS
DIASTOLIC BLOOD PRESSURE: 70 MMHG | BODY MASS INDEX: 45.17 KG/M2 | HEART RATE: 93 BPM | WEIGHT: 281.06 LBS | SYSTOLIC BLOOD PRESSURE: 112 MMHG | HEIGHT: 66 IN | OXYGEN SATURATION: 98 % | RESPIRATION RATE: 12 BRPM | TEMPERATURE: 97 F

## 2023-09-21 DIAGNOSIS — C18.7 MALIGNANT NEOPLASM OF SIGMOID COLON: ICD-10-CM

## 2023-09-21 DIAGNOSIS — C77.2 METASTASIS TO INTESTINAL LYMPH NODE: ICD-10-CM

## 2023-09-21 DIAGNOSIS — E83.52 HYPERCALCEMIA: ICD-10-CM

## 2023-09-21 DIAGNOSIS — C78.6 SECONDARY MALIGNANT NEOPLASM OF RETROPERITONEUM: ICD-10-CM

## 2023-09-21 DIAGNOSIS — C18.7 MALIGNANT NEOPLASM OF SIGMOID COLON: Primary | ICD-10-CM

## 2023-09-21 DIAGNOSIS — C77.9 SECONDARY ADENOCARCINOMA OF LYMPH NODE: ICD-10-CM

## 2023-09-21 DIAGNOSIS — Z00.6 EXAMINATION OF PARTICIPANT IN CLINICAL TRIAL: ICD-10-CM

## 2023-09-21 LAB
ALBUMIN SERPL BCP-MCNC: 3.5 G/DL (ref 3.5–5.2)
ALP SERPL-CCNC: 88 U/L (ref 55–135)
ALT SERPL W/O P-5'-P-CCNC: 38 U/L (ref 10–44)
ANION GAP SERPL CALC-SCNC: 8 MMOL/L (ref 8–16)
AST SERPL-CCNC: 32 U/L (ref 10–40)
BASOPHILS # BLD AUTO: 0.06 K/UL (ref 0–0.2)
BASOPHILS NFR BLD: 1.1 % (ref 0–1.9)
BILIRUB SERPL-MCNC: 0.3 MG/DL (ref 0.1–1)
BUN SERPL-MCNC: 11 MG/DL (ref 6–20)
CALCIUM SERPL-MCNC: 10.7 MG/DL (ref 8.7–10.5)
CHLORIDE SERPL-SCNC: 107 MMOL/L (ref 95–110)
CO2 SERPL-SCNC: 22 MMOL/L (ref 23–29)
CREAT SERPL-MCNC: 0.8 MG/DL (ref 0.5–1.4)
DIFFERENTIAL METHOD: ABNORMAL
EOSINOPHIL # BLD AUTO: 0.4 K/UL (ref 0–0.5)
EOSINOPHIL NFR BLD: 6.6 % (ref 0–8)
ERYTHROCYTE [DISTWIDTH] IN BLOOD BY AUTOMATED COUNT: 14.6 % (ref 11.5–14.5)
EST. GFR  (NO RACE VARIABLE): >60 ML/MIN/1.73 M^2
GLUCOSE SERPL-MCNC: 94 MG/DL (ref 70–110)
HCT VFR BLD AUTO: 44.9 % (ref 37–48.5)
HGB BLD-MCNC: 14.3 G/DL (ref 12–16)
IMM GRANULOCYTES # BLD AUTO: 0.01 K/UL (ref 0–0.04)
IMM GRANULOCYTES NFR BLD AUTO: 0.2 % (ref 0–0.5)
LYMPHOCYTES # BLD AUTO: 1.7 K/UL (ref 1–4.8)
LYMPHOCYTES NFR BLD: 31.7 % (ref 18–48)
MAGNESIUM SERPL-MCNC: 1.8 MG/DL (ref 1.6–2.6)
MCH RBC QN AUTO: 29.2 PG (ref 27–31)
MCHC RBC AUTO-ENTMCNC: 31.8 G/DL (ref 32–36)
MCV RBC AUTO: 92 FL (ref 82–98)
MONOCYTES # BLD AUTO: 0.8 K/UL (ref 0.3–1)
MONOCYTES NFR BLD: 14.5 % (ref 4–15)
NEUTROPHILS # BLD AUTO: 2.4 K/UL (ref 1.8–7.7)
NEUTROPHILS NFR BLD: 45.9 % (ref 38–73)
NRBC BLD-RTO: 0 /100 WBC
PLATELET # BLD AUTO: 260 K/UL (ref 150–450)
PMV BLD AUTO: 10.3 FL (ref 9.2–12.9)
POTASSIUM SERPL-SCNC: 4.4 MMOL/L (ref 3.5–5.1)
PROT SERPL-MCNC: 6.8 G/DL (ref 6–8.4)
RBC # BLD AUTO: 4.9 M/UL (ref 4–5.4)
SODIUM SERPL-SCNC: 137 MMOL/L (ref 136–145)
WBC # BLD AUTO: 5.3 K/UL (ref 3.9–12.7)

## 2023-09-21 PROCEDURE — 99999 PR PBB SHADOW E&M-EST. PATIENT-LVL IV: ICD-10-PCS | Mod: PBBFAC,,, | Performed by: INTERNAL MEDICINE

## 2023-09-21 PROCEDURE — 3078F DIAST BP <80 MM HG: CPT | Mod: CPTII,,, | Performed by: INTERNAL MEDICINE

## 2023-09-21 PROCEDURE — 83735 ASSAY OF MAGNESIUM: CPT | Mod: PN | Performed by: INTERNAL MEDICINE

## 2023-09-21 PROCEDURE — 4010F PR ACE/ARB THEARPY RXD/TAKEN: ICD-10-PCS | Mod: CPTII,,, | Performed by: INTERNAL MEDICINE

## 2023-09-21 PROCEDURE — 99214 OFFICE O/P EST MOD 30 MIN: CPT | Mod: PBBFAC,PN | Performed by: INTERNAL MEDICINE

## 2023-09-21 PROCEDURE — 3074F PR MOST RECENT SYSTOLIC BLOOD PRESSURE < 130 MM HG: ICD-10-PCS | Mod: CPTII,,, | Performed by: INTERNAL MEDICINE

## 2023-09-21 PROCEDURE — 3008F PR BODY MASS INDEX (BMI) DOCUMENTED: ICD-10-PCS | Mod: CPTII,,, | Performed by: INTERNAL MEDICINE

## 2023-09-21 PROCEDURE — 99215 PR OFFICE/OUTPT VISIT, EST, LEVL V, 40-54 MIN: ICD-10-PCS | Mod: S$PBB,,, | Performed by: INTERNAL MEDICINE

## 2023-09-21 PROCEDURE — 3008F BODY MASS INDEX DOCD: CPT | Mod: CPTII,,, | Performed by: INTERNAL MEDICINE

## 2023-09-21 PROCEDURE — 99215 OFFICE O/P EST HI 40 MIN: CPT | Mod: S$PBB,,, | Performed by: INTERNAL MEDICINE

## 2023-09-21 PROCEDURE — 3074F SYST BP LT 130 MM HG: CPT | Mod: CPTII,,, | Performed by: INTERNAL MEDICINE

## 2023-09-21 PROCEDURE — 85025 COMPLETE CBC W/AUTO DIFF WBC: CPT | Mod: PN | Performed by: INTERNAL MEDICINE

## 2023-09-21 PROCEDURE — 4010F ACE/ARB THERAPY RXD/TAKEN: CPT | Mod: CPTII,,, | Performed by: INTERNAL MEDICINE

## 2023-09-21 PROCEDURE — 99999 PR PBB SHADOW E&M-EST. PATIENT-LVL IV: CPT | Mod: PBBFAC,,, | Performed by: INTERNAL MEDICINE

## 2023-09-21 PROCEDURE — 3078F PR MOST RECENT DIASTOLIC BLOOD PRESSURE < 80 MM HG: ICD-10-PCS | Mod: CPTII,,, | Performed by: INTERNAL MEDICINE

## 2023-09-21 PROCEDURE — 80053 COMPREHEN METABOLIC PANEL: CPT | Mod: PN | Performed by: INTERNAL MEDICINE

## 2023-09-21 RX ORDER — EPINEPHRINE 0.3 MG/.3ML
0.3 INJECTION SUBCUTANEOUS ONCE AS NEEDED
Status: CANCELLED | OUTPATIENT
Start: 2023-10-06

## 2023-09-21 RX ORDER — ACETAMINOPHEN 325 MG/1
650 TABLET ORAL
Status: CANCELLED | OUTPATIENT
Start: 2023-09-22

## 2023-09-21 RX ORDER — HEPARIN 100 UNIT/ML
500 SYRINGE INTRAVENOUS
Status: CANCELLED | OUTPATIENT
Start: 2023-09-22

## 2023-09-21 RX ORDER — DIPHENHYDRAMINE HYDROCHLORIDE 50 MG/ML
50 INJECTION INTRAMUSCULAR; INTRAVENOUS ONCE AS NEEDED
Status: CANCELLED | OUTPATIENT
Start: 2023-09-22

## 2023-09-21 RX ORDER — HEPARIN 100 UNIT/ML
500 SYRINGE INTRAVENOUS
Status: CANCELLED | OUTPATIENT
Start: 2023-10-06

## 2023-09-21 RX ORDER — EPINEPHRINE 0.3 MG/.3ML
0.3 INJECTION SUBCUTANEOUS ONCE AS NEEDED
Status: CANCELLED | OUTPATIENT
Start: 2023-09-22

## 2023-09-21 RX ORDER — SODIUM CHLORIDE 0.9 % (FLUSH) 0.9 %
10 SYRINGE (ML) INJECTION
Status: CANCELLED | OUTPATIENT
Start: 2023-10-06

## 2023-09-21 RX ORDER — ACETAMINOPHEN 325 MG/1
650 TABLET ORAL
Status: CANCELLED | OUTPATIENT
Start: 2023-10-06

## 2023-09-21 RX ORDER — DIPHENHYDRAMINE HYDROCHLORIDE 50 MG/ML
50 INJECTION INTRAMUSCULAR; INTRAVENOUS ONCE AS NEEDED
Status: CANCELLED | OUTPATIENT
Start: 2023-10-06

## 2023-09-21 RX ORDER — SODIUM CHLORIDE 0.9 % (FLUSH) 0.9 %
10 SYRINGE (ML) INJECTION
Status: CANCELLED | OUTPATIENT
Start: 2023-09-22

## 2023-09-21 NOTE — PROGRESS NOTES
Gail Mckinnon, MRN [id number]  PID# [id number]    Protocol: SWOG   IRB#: 2023.047  : NGUYEN Degroot MD  Treating Investigator: JESUS Santo MD    Randomized Phase II Trial of Encorafenib and Cetuximab with or Without Nivolumab ([facility] #[id number]) for Patients with Previously Treated, Microsatellite Stable BRAF V600E Metastatic and/or Unresectable Colorectal Cancer   Cycle 1 Day 15 Arm 1/ Nivolumab + Cetuximab + Encorafenib therapy.    21SEP2023    Cycle 1 Day 29 Cetuximab and Nivolumab     Note: Cycle 2 Day 1 will be administered on 22SEP2023 related to extended infusion time resulting from C1D1 Grade II Infusion Reaction- (infusion unable to accommodate extended time following the MD appt).     The patient presented to the planned office visit alone. The patient was alert, oriented, without noted distress, with appropriate mood and affect for the situation, and freely agreed to continue with participating in the referenced study.  Pt completed the required pre-tx labs: CBC w/ diff, CMP, Magnesium- all were resulted prior to visit.    Protocol does not require TSH at this time point. Endocrinology following TSH for patient's baseline Grade II Hypothyroidism. See communication from study chair regarding TSH monitoring for this patient in Cycle 1 section of shadow chart.    21AUG2023 TSH- elevated @ 8.7uIU/ml [0.400-4.000 uIU/mL] Baseline drawn r/t patient's diagnosis of hypothyroidism and planned Nivolumab; T4, Free @ 0.99 ng/dL [0.71- 1.51 ng/dL]  PTH elevated @ 217.8 pg/mL [9.0-77.0 pg/mL]- baseline drawn r/t hypercalcemia (see corrected calcium source doc) and history of elevated PTH 9/2021 and diagnosis of Multinodular Goiter.  Corrected calcium calculated as 11.10 mg/dL, using results from today's labs.  As of 21AUG2023, the patient's endocrinologist increased dose from 200mcg Daily to 224 mcg Daily with a repeat TSH, Renal Panel, PTH, Vit D and 24 hr urine Ca/Cr in 6 weeks (~ October 5th, 2023).              See printed source docs under C1 D1 CRC note.  The treating MD is aware of Dr. Moss's (endocrinologist) plan of Synthroid dose increase and planned follow up and deems the patient's asymptomatic Baseline Grade II Hypothyroidism as not CS- no new orders given.   Reviewed Section 8.3.1 Nivolumab dose mods with treating MD. The Grade II Hypothyroidism was present at Baseline, has not increased in grade, and the patient is asymptomatic per the treating MD's PE. The patient denies any persistent headaches or visual changes.      ConMed list reviewed with the patient for accuracy- see shadow chart for changes.             Patient admits to being compliant with QD dosing of encorafenib and the use of the protocol's pill diary, although she forgot to bring the diary from Cycle with her to today's visit. She reports she will bring it to her infusion appt tomorrow morning.   Being that Cycle 2 D1 was extended by one day, the patient returned #4 pills (out of #120) which equals a correct count. I brought the remaining pills to pharmacy for destruction per institutional guidelines. (Lot # [id number]; Exp 6/2024 was noted on the bottle)  The patient reports receiving refill of Encorafenib for Cycle 2 on [id number] and will begin taking 300mg QD dose from this refill on 22SEP2023 (C2D1).  It was reviewed with the patient that Encorafenib dosing for Cycle 2 would equal #112 capsules, and that she would bring the remaining capsules back at the end of the cycle for disposal by the [facility] pharmacy.        * Last dose of Encorafenib from this refill/supply is expected to take place on Thursday 19OCT2023 (C2D28) Pt. made aware to call this CRC if she does not receive communication for next refill from [facility] pharmacy by second week in October.   The patient verbalized understanding to take Encorafenib #4 capsule with water in the morning at the same time each day. Pt. also agreed to taking before leaving home on the days of  "infusional therapy to provide one hour time frame before receiving oral pre-medications.    Baseline Fatigue- Grade 1- pt reports continues and able to perform all ADLS.  Baseline Abdominal Pain- Grade 1 (Start date 5/2023- Ended 27AUG2023).The patient reports that she has not had to take any Rx pain medication since 8/27/2023.   Baseline Hypercalcemia-Grade 1 continues- corrected calcium calculation attached.  See shadow chart for full list of Baseline AEs.     AEs:   The patient denied experiencing any infusion related reaction including chills/rigors, nausea, vomiting, or headache during or after the [id number] infusion.  Per protocol Section 7 / 7.1 & Table 3 The treating MD added hydrocortisone 50mg IV to Acetaminophen and Diphenhydramine premeds and increased infusion time of Cetuximab to four hours followed by a period of observation at Cycle 1 Day 15. Pt tolerated the Day 15 infusion well and these pre-meds and infusion extension will be added to pt's treatment moving forward.     Rash-Maculo-papular Grade 1 Start date 8/28/2023- patient reports rash remains stable. She confirms using clindamycin gel and doxycycline as prescribed for sporadic areas of pruritic rash. Mild soaps, temped water, and moisturizers recommended. Erbitux skin care kit provided to patient as packaged from [employer]. The treating MD is aware and these areas were assessed by Dr Santo during the visit this afternoon, does not deem CS, and no new orders given.    /72, Pulse 93, Temp 97,  Resp 12    Ht 5' 6" (1.676 m) Wt. 127.5 kg (281 lb 1.4 oz) SpO2 98% BSA 2.44 m²      Zubrod PS: One     Dr. Santo assessed all labs, VS & any PE findings as either WNL or NCS. At the discretion of the treating MD, the patient's current cardiac function does not require evaluation by ECG. Pt was deemed eligible to receive C2 D1 Erbitux with Nivolumab in the AM. Orders were reviewed and signed by Dr Santo.   Baseline BSA 2.46 m2 based on Screening: Ht. " 5'6 and 287.26 lbs. (130.3 kg)         Treatment Dosing for C2 D1:  The treating MD deemed the patient appropriate to receive C2D1 and for the patient's safety plans to continue following the protocol's recommendations of adding Hydrocortisone to other premeds and extending the infusion time of the Cetuximab to prevent delayed infusion reaction.  *Infusion time extended per protocol Cetuximab-Related Infusion Reactions (Table 3).  Hydrocortisone 50mg IV added to premedication of Acetaminophen and Diphenhydramine. Protocol Section 7.0 / 7.1    Since pt's wt has not changed by 10% from original dosing, her orders were signed using baseline calculations:  Cetuximab 500 mg/m2 x 2.46 m2 BSA = 1230 mg over 4 hours- Rounded dose via pharmacy policy resulted in a dose of 1200mg.  Nivolumab Flat dose 480mg over 30minutes.    This CRC reviewed upcoming appointments with the patient, and she was encouraged to call with any new symptoms or issues, the patient verbalized understanding. Patient made aware to call after hour number, that she agrees to having, if after business hours or over the weekend.    The patient reports she will be going out of town 10/10-10/18/23 and is asking if her CT Scan can be rescheduled from 10/13/23 to either 10/9 or 10/19 (prior to appt w/ Dr Santo). I explained we would look into the protocol's timeline, and someone will get back in touch w/ her regarding an answer. Pt thanked CRC. She had no further questions.      Cycle 2 Day 15:  [id number] 1st floor lab @ 12:30   [id number] OV with Dr. Santo @ 14:00   [id number] Infusion @ 0830    Next protocol required bio-specimen at C3D1 planned for 10/19/2023

## 2023-09-21 NOTE — PROGRESS NOTES
SW met with pt briefly. She said she has been feeling good except for fatigue. SW asked pt if she has been involved in IO services. She looked into Yoga in the past but did not participate  because needed to have a COVID vaccine. SW explained that has been lifted. SW shared information on some of the services aviable such as [employer], support groups and classes. Pt is interested. SW asked Al in IO to contact pt for further explanation. She will follow up with the pt.     JENNIE SamuelsW

## 2023-09-21 NOTE — PROGRESS NOTES
PROGRESS NOTE    Subjective:       Patient ID: Gail Mckinnon is a 53 y.o. female.  MRN: [id number]  : 1970    Chief Complaint: Metastatic colon cancer     History of Present Illness:   Gail Mckinnon is a 53 y.o. female who presents with colon cancer, initially stage III and now with LN recurrence.    On FOLIRI+ Avastin sine .     She was briefly switched to xeloda due to 5 FU shortage for a month. Did not tolerate Xeloda well due hand foot syndrome, blistering of feet, did not take the last day of Xeloda. Completed .     Resumed Folfiri + Avastin on 23. Atropine was held due to prior constipation.     Was admitted to the hospital from -23 for intractable nausea , vomiting and diarrhea as well as abdominal pain. No hematochezia or dark stool was noted.      US showed gallbladder stone and dilated CBD. She was managed conservatively. Had CT abd, pelvis, colonoscopy and MRCP that were relatively unremarkable without signs of cholecystitis. Cholecystectomy was not recommended.     She had recently resumed FOLFIRI and the symptoms started after the first cycle of resuming, never had issues prior to this.  Stayed in the hospital for 10 days.  C diff was negative. No other etiology was established. Symptoms improved over time and she was discharged on .     Interim history:  Enrolled in  SWOG 2107 (encorafenib/cetuximab and randomized to the Nivolumab arm), here to obtain for cycle 2, day 1, due tomorrow.    Reports stable fatigue, skin rash, on doxycycline.   Intermittent nausea, usually requests zofran prior to treatment.      Reports that her abdominal pain has resolved.     Aware of Grave's disease history in , treated with LEE, now on synthroid.     Oncology History:  She completed adjuvant FOLFOX in 2020. She tolerated only 4 cycles of FOLFOX before she developed an infusion reaction to oxaliplatin in  cycle 5 was well as cycle 6. She then completed 6 cycles of infusional 5 FU.     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 She was offered second line therapy with FOLFIRI and Avastin.      She presented to the ED on 11/26 with left flank pain. CT renal stone study showed Moderate hydronephrosis on the left secondary to 3 mm calculus at the UPJ.    She had a PET scan mid April that showed possible progression of her disease with      She has been to [facility] for another opinion. No change was recommended in systemic therapy but she was offered surgical removal of the aorta caval nodes.  Recent sans at [facility]  show stable disease.      Surgery done 7/12/22. Received 2 more cycle of FOLFIRI without Avastin.     She had repeat imaging at [facility] on 9/24/22 that unfortunately showed disease progression since her surgery with multiple RP nodes and one mediastinal node.       PET 12/8/22  Impression:     1. Progression of disease with interval increase of abdominal and pelvic lymphadenopathy.  2. New area of moderate FDG uptake at the right half of the T9 vertebral body (image 124).  Favor degenerative disc changes.  No underlying osteolytic or osteoblastic lesion.  Recommend continued attention on follow-up.  3. No other evidence of FDG avid disease.     12/22/22  Impression After scan comparison:     1. Metastatic portacaval and left periaortic retroperitoneal lymph nodes which remain stable between the CT of 09/24/2022 and the subsequent PET-CT of 12/08/2022.  There is no evidence of progression of metastatic disease.  2. Nonobstructing bilateral renal calculi and cholelithiasis.    2/10/22:  CT chest abd pelvis  Impression:     Stable periportal, retroperitoneal and mesenteric lymphadenopathy compared to PET-CT 12/08/2022.     Stable right middle lobe 3 mm pulmonary nodule.  No new or enlarging pulmonary nodule.     Hepatic steatosis.  Hepatosplenomegaly.     Cholelithiasis.     Bilateral  nonobstructing nephrolithiasis.     Small hiatal hernia with retained contrast in the distal esophagus.  Correlate for reflux.    She had virtual visit at [facility] on 2/17/23.     She has continued FOLFIRI and Avastin.     CT abd pelvis   5/7/23  Impression:     1. Mesenteric inflammatory changes have worsened since the prior study.  2. Mesenteric, retroperitoneal and left-sided pelvic enlarged lymph nodes are unchanged.     8/11/23  CT chest abd pelvis   Impression:     1. Patient with a history of colon adenocarcinoma with worsening periaortic, retroperitoneal, mesenteric, and iliac chain lymphadenopathy the in the abdomen and pelvis when compared with the previous study suggesting worsening metastatic disease.  2. Moderate left hydronephrosis and proximal left hydroureter to the level of possible retroperitoneal scarring.  Invasion/compression of the left ureter is not excluded.  3. Bilateral nephrolithiasis  4. Hepatomegaly and hepatic steatosis  5. Two tiny < 5 mm pulmonary nodules within the chest, unchanged.  No new pulmonary nodules.      Oncology History:  Oncology History   Malignant neoplasm of sigmoid colon   3/16/2020 Initial Diagnosis    Malignant neoplasm of sigmoid colon     3/31/2020 Cancer Staged    Staging form: Colon and Rectum, AJCC 8th Edition  - Clinical stage from 3/31/2020: Stage IIIC (cT4b, cN2a, cM0)     5/6/2020 - 11/17/2020 Chemotherapy    Treatment Summary   Plan Name: OP FOLFOX 6 Q2W  Treatment Goal: Curative  Status: Inactive  Start Date: 5/6/2020  End Date: 11/6/2020  Provider: Dylan Leyva MD  Chemotherapy: fluorouraciL injection 945 mg, 400 mg/m2 = 945 mg, Intravenous, Clinic/[facility] 1 time, 14 of 14 cycles  Administration: 945 mg (5/6/2020), 945 mg (5/20/2020), 945 mg (6/3/2020), 945 mg (6/17/2020), 945 mg (7/29/2020), 945 mg (8/12/2020), 945 mg (8/26/2020), 945 mg (9/9/2020), 945 mg (9/23/2020), 945 mg (10/6/2020), 945 mg (10/21/2020), 945 mg (11/4/2020)  fluorouraciL 2,400 mg/m2 =  5,665 mg in sodium chloride 0.9% 240 mL chemo infusion, 2,400 mg/m2 = 5,665 mg, Intravenous, Over 46 hours, 14 of 14 cycles  Administration: 5,665 mg (5/6/2020), 5,665 mg (5/20/2020), 5,665 mg (6/3/2020), 5,665 mg (6/17/2020), 5,665 mg (7/29/2020), 5,665 mg (8/12/2020), 5,665 mg (8/26/2020), 5,665 mg (9/9/2020), 5,665 mg (9/23/2020), 5,665 mg (10/6/2020), 5,665 mg (10/21/2020), 5,665 mg (11/4/2020)  leucovorin calcium 900 mg in dextrose 5 % 250 mL infusion, 945 mg, Intravenous, Clinic/[facility] 1 time, 14 of 14 cycles  Administration: 900 mg (5/6/2020), 900 mg (5/20/2020), 900 mg (6/3/2020), 900 mg (6/17/2020), 945 mg (6/30/2020), 945 mg (7/20/2020), 945 mg (7/29/2020), 945 mg (8/12/2020), 945 mg (8/26/2020), 945 mg (9/9/2020), 945 mg (9/23/2020), 945 mg (10/6/2020), 945 mg (10/21/2020), 945 mg (11/4/2020)  oxaliplatin (ELOXATIN) 200 mg in dextrose 5 % 500 mL chemo infusion, 201 mg, Intravenous, Clinic/[facility] 1 time, 6 of 6 cycles  Dose modification: 65 mg/m2 (original dose 85 mg/m2, Cycle 6)  Administration: 200 mg (5/6/2020), 200 mg (5/20/2020), 200 mg (6/3/2020), 200 mg (6/17/2020), 201 mg (6/30/2020), 150 mg (7/20/2020)     5/3/2021 Cancer Staged    Staging form: Colon and Rectum, AJCC 8th Edition  - Clinical stage from 5/3/2021: Stage Unknown (rcT0, cNX, cM1)     6/16/2021 - 8/2/2023 Chemotherapy    Treatment Summary   Plan Name: OP COLORECTAL FOLFIRI + BEVACIZUMAB Q2W  Treatment Goal: Control  Status: Inactive  Start Date: 6/16/2021  End Date: 8/2/2023  Provider: Marah Santo MD  Chemotherapy: fluorouraciL injection 990 mg, 400 mg/m2 = 990 mg, Intravenous, Clinic/[facility] 1 time, 15 of 15 cycles  Administration: 990 mg (6/16/2021), 990 mg (6/30/2021), 990 mg (7/14/2021), 980 mg (7/28/2021), 990 mg (8/11/2021), 990 mg (8/25/2021), 990 mg (9/8/2021), 990 mg (9/22/2021), 990 mg (10/6/2021), 990 mg (10/20/2021), 990 mg (11/3/2021), 990 mg (12/1/2021), 990 mg (12/15/2021), 990 mg (11/17/2021), 990 mg  (12/28/2021)  fluorouraciL 2,400 mg/m2 = 5,950 mg in sodium chloride 0.9% 240 mL chemo infusion, 2,400 mg/m2 = 5,950 mg, Intravenous, Over 46 hours, 43 of 46 cycles  Administration: 5,950 mg (6/16/2021), 5,950 mg (6/30/2021), 5,950 mg (7/14/2021), 5,880 mg (7/28/2021), 5,930 mg (8/11/2021), 5,930 mg (8/25/2021), 5,930 mg (9/8/2021), 5,930 mg (9/22/2021), 5,930 mg (10/6/2021), 5,930 mg (10/20/2021), 5,930 mg (11/3/2021), 5,930 mg (12/1/2021), 5,930 mg (12/15/2021), 5,930 mg (11/17/2021), 5,930 mg (12/28/2021), 6,050 mg (5/11/2022), 6,025 mg (3/9/2022), 6,025 mg (2/23/2022), 5,930 mg (2/2/2022), 5,930 mg (1/19/2022), 6,050 mg (4/20/2022), 6,025 mg (4/6/2022), 6,025 mg (3/23/2022), 6,050 mg (6/1/2022), 6,050 mg (6/15/2022), 6,050 mg (10/19/2022), 6,050 mg (10/5/2022), 6,050 mg (11/2/2022), 6,050 mg (11/16/2022), 6,050 mg (11/30/2022), 6,050 mg (1/4/2023), 6,050 mg (12/19/2022), 6,050 mg (1/18/2023), 6,000 mg (5/22/2023), 6,000 mg (4/26/2023), 6,000 mg (4/11/2023), 6,000 mg (2/28/2023), 6,050 mg (2/14/2023), 6,000 mg (2/1/2023), 6,000 mg (7/5/2023), 6,000 mg (6/19/2023), 6,000 mg (6/5/2023), 6,000 mg (7/31/2023)  bevacizumab (AVASTIN) 600 mg in sodium chloride 0.9% 100 mL chemo infusion, 660 mg, Intravenous, Clinic/[facility] 1 time, 36 of 36 cycles  Dose modification: 5 mg/kg (original dose 5 mg/kg, Cycle 26, Reason: MD Discretion, Comment: 5 mg/kg original dose)  Administration: 600 mg (6/30/2021), 600 mg (7/14/2021), 600 mg (7/28/2021), 600 mg (6/16/2021), 600 mg (8/11/2021), 655 mg (8/25/2021), 600 mg (9/8/2021), 600 mg (9/22/2021), 600 mg (10/6/2021), 600 mg (10/20/2021), 600 mg (11/3/2021), 655 mg (12/1/2021), 600 mg (12/15/2021), 600 mg (11/17/2021), 600 mg (12/28/2021), 600 mg (5/11/2022), 600 mg (3/9/2022), 600 mg (2/23/2022), 600 mg (2/2/2022), 600 mg (1/19/2022), 600 mg (4/20/2022), 680 mg (4/6/2022), 600 mg (3/23/2022), 680 mg (10/5/2022), 680 mg (10/19/2022), 680 mg (11/2/2022), 680 mg (11/16/2022), 680 mg  (11/30/2022), 680 mg (1/4/2023), 680 mg (12/19/2022), 680 mg (1/18/2023), 680 mg (3/28/2023), 680 mg (3/14/2023), 680 mg (2/28/2023), 680 mg (2/14/2023), 680 mg (2/1/2023)  irinotecan (CAMPTOSAR) 440 mg in sodium chloride 0.9% 500 mL chemo infusion, 446 mg, Intravenous, Clinic/[facility] 1 time, 45 of 48 cycles  Dose modification: 180 mg/m2 (original dose 180 mg/m2, Cycle 24)  Administration: 440 mg (6/16/2021), 440 mg (6/30/2021), 440 mg (7/14/2021), 440 mg (7/28/2021), 440 mg (8/11/2021), 444 mg (8/25/2021), 440 mg (9/8/2021), 440 mg (9/22/2021), 440 mg (10/6/2021), 440 mg (10/20/2021), 440 mg (11/3/2021), 440 mg (12/1/2021), 440 mg (12/15/2021), 440 mg (11/17/2021), 440 mg (12/28/2021), 440 mg (5/11/2022), 440 mg (3/9/2022), 440 mg (2/23/2022), 440 mg (2/2/2022), 440 mg (1/19/2022), 440 mg (4/20/2022), 440 mg (4/6/2022), 440 mg (3/24/2022), 440 mg (6/1/2022), 440 mg (6/15/2022), 440 mg (10/19/2022), 440 mg (10/5/2022), 440 mg (11/2/2022), 440 mg (11/16/2022), 440 mg (11/30/2022), 440 mg (1/4/2023), 440 mg (12/19/2022), 440 mg (1/18/2023), 440 mg (5/22/2023), 440 mg (4/26/2023), 440 mg (4/11/2023), 440 mg (3/28/2023), 440 mg (3/14/2023), 440 mg (2/28/2023), 440 mg (2/14/2023), 440 mg (2/1/2023), 440 mg (7/5/2023), 440 mg (6/19/2023), 440 mg (6/5/2023), 440 mg (7/31/2023)  bevacizumab-awwb (MVASI) 5 mg/kg = 680 mg in sodium chloride 0.9% 100 mL infusion, 5 mg/kg = 680 mg (100 % of original dose 5 mg/kg), Intravenous, Clinic/[facility] 1 time, 7 of 10 cycles  Dose modification: 5 mg/kg (original dose 5 mg/kg, Cycle 39)  Administration: 680 mg (4/11/2023), 680 mg (4/26/2023), 680 mg (5/22/2023), 680 mg (7/5/2023), 680 mg (6/19/2023), 680 mg (6/5/2023), 680 mg (7/31/2023)     8/17/2023 - 8/18/2023 Chemotherapy    Treatment Summary   Plan Name: OP CETUXIMAB 500MG Q2W  Treatment Goal: Control  Status: Inactive  Start Date:   End Date:   Provider: Marah Santo MD  Chemotherapy: [No matching medication found in this treatment  plan]     8/24/2023 -  Chemotherapy    Treatment Summary   Plan Name: [facility] - ARM 1 ENCORAFENIB  CETUXIMAB NIVOLUMAB  Treatment Goal: Palliative  Status: Active  Start Date: 8/24/2023  End Date: 7/12/2024 (Planned)  Provider: Marah Santo MD  Chemotherapy: cetuximab (ERBITUX) 100 mg/50 mL chemo infusion 1,200 mg, 1,230 mg, Intravenous, Clinic/[facility] 1 time, 1 of 12 cycles  Administration: 1,200 mg (8/24/2023), 1,200 mg (9/8/2023)  encorafenib 75 mg Cap, 300 mg, Oral, Daily, 1 of 1 cycle, Start date: --, End date: --     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   4/3/2020 Initial Diagnosis    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     5/6/2020 - 11/17/2020 Chemotherapy    Treatment Summary   Plan Name: OP FOLFOX 6 Q2W  Treatment Goal: Curative  Status: Inactive  Start Date: 5/6/2020  End Date: 11/6/2020  Provider: Dylan Leyva MD  Chemotherapy: fluorouraciL injection 945 mg, 400 mg/m2 = 945 mg, Intravenous, Clinic/[facility] 1 time, 14 of 14 cycles  Administration: 945 mg (5/6/2020), 945 mg (5/20/2020), 945 mg (6/3/2020), 945 mg (6/17/2020), 945 mg (7/29/2020), 945 mg (8/12/2020), 945 mg (8/26/2020), 945 mg (9/9/2020), 945 mg (9/23/2020), 945 mg (10/6/2020), 945 mg (10/21/2020), 945 mg (11/4/2020)  fluorouraciL 2,400 mg/m2 = 5,665 mg in sodium chloride 0.9% 240 mL chemo infusion, 2,400 mg/m2 = 5,665 mg, Intravenous, Over 46 hours, 14 of 14 cycles  Administration: 5,665 mg (5/6/2020), 5,665 mg (5/20/2020), 5,665 mg (6/3/2020), 5,665 mg (6/17/2020), 5,665 mg (7/29/2020), 5,665 mg (8/12/2020), 5,665 mg (8/26/2020), 5,665 mg (9/9/2020), 5,665 mg (9/23/2020), 5,665 mg (10/6/2020), 5,665 mg (10/21/2020), 5,665 mg (11/4/2020)  leucovorin calcium 900 mg in dextrose 5 % 250 mL infusion, 945 mg, Intravenous, Clinic/[facility] 1 time, 14 of 14 cycles  Administration: 900 mg (5/6/2020), 900 mg (5/20/2020), 900 mg (6/3/2020), 900 mg (6/17/2020), 945 mg (6/30/2020), 945 mg (7/20/2020), 945 mg (7/29/2020), 945 mg (8/12/2020), 945 mg (8/26/2020),  945 mg (9/9/2020), 945 mg (9/23/2020), 945 mg (10/6/2020), 945 mg (10/21/2020), 945 mg (11/4/2020)  oxaliplatin (ELOXATIN) 200 mg in dextrose 5 % 500 mL chemo infusion, 201 mg, Intravenous, Clinic/[facility] 1 time, 6 of 6 cycles  Dose modification: 65 mg/m2 (original dose 85 mg/m2, Cycle 6)  Administration: 200 mg (5/6/2020), 200 mg (5/20/2020), 200 mg (6/3/2020), 200 mg (6/17/2020), 201 mg (6/30/2020), 150 mg (7/20/2020)     6/16/2021 - 8/2/2023 Chemotherapy    Treatment Summary   Plan Name: OP COLORECTAL FOLFIRI + BEVACIZUMAB Q2W  Treatment Goal: Control  Status: Inactive  Start Date: 6/16/2021  End Date: 8/2/2023  Provider: Marah Santo MD  Chemotherapy: fluorouraciL injection 990 mg, 400 mg/m2 = 990 mg, Intravenous, Clinic/[facility] 1 time, 15 of 15 cycles  Administration: 990 mg (6/16/2021), 990 mg (6/30/2021), 990 mg (7/14/2021), 980 mg (7/28/2021), 990 mg (8/11/2021), 990 mg (8/25/2021), 990 mg (9/8/2021), 990 mg (9/22/2021), 990 mg (10/6/2021), 990 mg (10/20/2021), 990 mg (11/3/2021), 990 mg (12/1/2021), 990 mg (12/15/2021), 990 mg (11/17/2021), 990 mg (12/28/2021)  fluorouraciL 2,400 mg/m2 = 5,950 mg in sodium chloride 0.9% 240 mL chemo infusion, 2,400 mg/m2 = 5,950 mg, Intravenous, Over 46 hours, 43 of 46 cycles  Administration: 5,950 mg (6/16/2021), 5,950 mg (6/30/2021), 5,950 mg (7/14/2021), 5,880 mg (7/28/2021), 5,930 mg (8/11/2021), 5,930 mg (8/25/2021), 5,930 mg (9/8/2021), 5,930 mg (9/22/2021), 5,930 mg (10/6/2021), 5,930 mg (10/20/2021), 5,930 mg (11/3/2021), 5,930 mg (12/1/2021), 5,930 mg (12/15/2021), 5,930 mg (11/17/2021), 5,930 mg (12/28/2021), 6,050 mg (5/11/2022), 6,025 mg (3/9/2022), 6,025 mg (2/23/2022), 5,930 mg (2/2/2022), 5,930 mg (1/19/2022), 6,050 mg (4/20/2022), 6,025 mg (4/6/2022), 6,025 mg (3/23/2022), 6,050 mg (6/1/2022), 6,050 mg (6/15/2022), 6,050 mg (10/19/2022), 6,050 mg (10/5/2022), 6,050 mg (11/2/2022), 6,050 mg (11/16/2022), 6,050 mg (11/30/2022), 6,050 mg (1/4/2023), 6,050 mg  (12/19/2022), 6,050 mg (1/18/2023), 6,000 mg (5/22/2023), 6,000 mg (4/26/2023), 6,000 mg (4/11/2023), 6,000 mg (2/28/2023), 6,050 mg (2/14/2023), 6,000 mg (2/1/2023), 6,000 mg (7/5/2023), 6,000 mg (6/19/2023), 6,000 mg (6/5/2023), 6,000 mg (7/31/2023)  bevacizumab (AVASTIN) 600 mg in sodium chloride 0.9% 100 mL chemo infusion, 660 mg, Intravenous, [facility] 1 time, 36 of 36 cycles  Dose modification: 5 mg/kg (original dose 5 mg/kg, Cycle 26, Reason: MD Discretion, Comment: 5 mg/kg original dose)  Administration: 600 mg (6/30/2021), 600 mg (7/14/2021), 600 mg (7/28/2021), 600 mg (6/16/2021), 600 mg (8/11/2021), 655 mg (8/25/2021), 600 mg (9/8/2021), 600 mg (9/22/2021), 600 mg (10/6/2021), 600 mg (10/20/2021), 600 mg (11/3/2021), 655 mg (12/1/2021), 600 mg (12/15/2021), 600 mg (11/17/2021), 600 mg (12/28/2021), 600 mg (5/11/2022), 600 mg (3/9/2022), 600 mg (2/23/2022), 600 mg (2/2/2022), 600 mg (1/19/2022), 600 mg (4/20/2022), 680 mg (4/6/2022), 600 mg (3/23/2022), 680 mg (10/5/2022), 680 mg (10/19/2022), 680 mg (11/2/2022), 680 mg (11/16/2022), 680 mg (11/30/2022), 680 mg (1/4/2023), 680 mg (12/19/2022), 680 mg (1/18/2023), 680 mg (3/28/2023), 680 mg (3/14/2023), 680 mg (2/28/2023), 680 mg (2/14/2023), 680 mg (2/1/2023)  irinotecan (CAMPTOSAR) 440 mg in sodium chloride 0.9% 500 mL chemo infusion, 446 mg, Intravenous, Clinic/[facility] 1 time, 45 of 48 cycles  Dose modification: 180 mg/m2 (original dose 180 mg/m2, Cycle 24)  Administration: 440 mg (6/16/2021), 440 mg (6/30/2021), 440 mg (7/14/2021), 440 mg (7/28/2021), 440 mg (8/11/2021), 444 mg (8/25/2021), 440 mg (9/8/2021), 440 mg (9/22/2021), 440 mg (10/6/2021), 440 mg (10/20/2021), 440 mg (11/3/2021), 440 mg (12/1/2021), 440 mg (12/15/2021), 440 mg (11/17/2021), 440 mg (12/28/2021), 440 mg (5/11/2022), 440 mg (3/9/2022), 440 mg (2/23/2022), 440 mg (2/2/2022), 440 mg (1/19/2022), 440 mg (4/20/2022), 440 mg (4/6/2022), 440 mg (3/24/2022), 440 mg (6/1/2022), 440 mg  (6/15/2022), 440 mg (10/19/2022), 440 mg (10/5/2022), 440 mg (11/2/2022), 440 mg (11/16/2022), 440 mg (11/30/2022), 440 mg (1/4/2023), 440 mg (12/19/2022), 440 mg (1/18/2023), 440 mg (5/22/2023), 440 mg (4/26/2023), 440 mg (4/11/2023), 440 mg (3/28/2023), 440 mg (3/14/2023), 440 mg (2/28/2023), 440 mg (2/14/2023), 440 mg (2/1/2023), 440 mg (7/5/2023), 440 mg (6/19/2023), 440 mg (6/5/2023), 440 mg (7/31/2023)  bevacizumab-awwb (MVASI) 5 mg/kg = 680 mg in sodium chloride 0.9% 100 mL infusion, 5 mg/kg = 680 mg (100 % of original dose 5 mg/kg), Intravenous, Clinic/[facility] 1 time, 7 of 10 cycles  Dose modification: 5 mg/kg (original dose 5 mg/kg, Cycle 39)  Administration: 680 mg (4/11/2023), 680 mg (4/26/2023), 680 mg (5/22/2023), 680 mg (7/5/2023), 680 mg (6/19/2023), 680 mg (6/5/2023), 680 mg (7/31/2023)     8/17/2023 - 8/18/2023 Chemotherapy    Treatment Summary   Plan Name: OP CETUXIMAB 500MG Q2W  Treatment Goal: Control  Status: Inactive  Start Date:   End Date:   Provider: Marah Santo MD  Chemotherapy: [No matching medication found in this treatment plan]     8/24/2023 -  Chemotherapy    Treatment Summary   Plan Name: [facility] - ARM 1 ENCORAFENIB  CETUXIMAB NIVOLUMAB  Treatment Goal: Palliative  Status: Active  Start Date: 8/24/2023  End Date: 7/12/2024 (Planned)  Provider: Marah Santo MD  Chemotherapy: cetuximab (ERBITUX) 100 mg/50 mL chemo infusion 1,200 mg, 1,230 mg, Intravenous, Clinic/[facility] 1 time, 1 of 12 cycles  Administration: 1,200 mg (8/24/2023), 1,200 mg (9/8/2023)  encorafenib 75 mg Cap, 300 mg, Oral, Daily, 1 of 1 cycle, Start date: --, End date: --         History:  Past Medical History:   Diagnosis Date    Anxiety     Depression     FH: ovarian cancer 3/16/2020    Hx of psychiatric care     Effexor, Paxil, Lexapro, Zoloft, Wellbutrin, Trazodone Buspar    Hyperthyroidism     Hypothyroid     Kidney calculi     Malignant neoplasm of sigmoid colon 3/16/2020    Menorrhagia     Multinodular goiter  2012    Palpitation     Psychiatric problem     Venous insufficiency       Past Surgical History:   Procedure Laterality Date     SECTION, CLASSIC      x3    COLONOSCOPY N/A 2020    Procedure: COLONOSCOPY;  Surgeon: Shane Parker MD;  Location: [facility];  Service: Endoscopy;  Laterality: N/A;    COLONOSCOPY N/A 2022    Procedure: COLONOSCOPY;  Surgeon: Shane Parker MD;  Location: [facility];  Service: Endoscopy;  Laterality: N/A;    COLONOSCOPY N/A 2023    Procedure: COLONOSCOPY;  Surgeon: Shane Parker MD;  Location: [facility];  Service: Endoscopy;  Laterality: N/A;  no cecum anastomosis    CYSTOSCOPY W/ URETERAL STENT PLACEMENT Bilateral 3/25/2020    Procedure: CYSTOSCOPY, WITH URETERAL STENT INSERTION;  Surgeon: Claudio Tyson MD;  Location: [facility] OR [address] [place] [address];  Service: Urology;  Laterality: Bilateral;    INSERTION OF TUNNELED CENTRAL VENOUS CATHETER (CVC) WITH SUBCUTANEOUS PORT N/A 2020    Procedure: [id number];  Surgeon: [facility] Diagnostic Provider;  [facility];  Service: Radiology;  Laterality: N/A;  Room 189/[facility]    LAPAROSCOPIC SIGMOIDECTOMY N/A 3/25/2020    Procedure: COLECTOMY, SIGMOID, LAPAROSCOPIC, flex sig, ERAS high;  Surgeon: Silvio Man MD;  [facility];  Service: Colon and Rectal;  Laterality: N/A;    MOBILIZATION OF SPLENIC FLEXURE  3/25/2020    Procedure: MOBILIZATION, SPLENIC FLEXURE;  Surgeon: Silvio Man MD;  [facility];  Service: Colon and Rectal;;    tonsillectomy      TOTAL ABDOMINAL HYSTERECTOMY W/ BILATERAL SALPINGOOPHORECTOMY N/A 3/25/2020    Procedure: HYSTERECTOMY, TOTAL, ABDOMINAL, WITH BILATERAL SALPINGO-OOPHORECTOMY;  Surgeon: Keron Brady MD;  [facility];  Service: Oncology;  Laterality: N/A;     Family History   Problem Relation Age of Onset    Heart disease Father     Diabetes Mother 65    Drug abuse Brother     Drug abuse Brother      Cancer Maternal Aunt         lung cancer    Cancer Maternal Grandmother         stomach cancer- started in ovaries    Ovarian cancer Maternal Grandmother         stomach cancer- started in ovaries    Colon cancer Paternal Uncle     Cancer Paternal Uncle     Ovarian cancer Maternal Aunt     Ovarian cancer Maternal Aunt     Ovarian cancer Cousin         mother had ovarian cancer    Drug abuse Son     Drug abuse Son         clean/sober since 2012    Colon cancer Son     No Known Problems Daughter     Uterine cancer Neg Hx     Breast cancer Neg Hx      Social History     Tobacco Use    Smoking status: Never    Smokeless tobacco: Never   Substance and Sexual Activity    Alcohol use: Yes     Comment: occasionally- twice monthly    Drug use: Never    Sexual activity: Yes     Partners: Male     Birth control/protection: See Surgical Hx        ROS:   Review of Systems   Constitutional:  Positive for malaise/fatigue. Negative for fever and weight loss.   HENT:  Negative for congestion, hearing loss, nosebleeds and sore throat.    Eyes:  Negative for double vision and photophobia.   Respiratory:  Negative for cough, hemoptysis, sputum production, shortness of breath and wheezing.    Cardiovascular:  Negative for chest pain, palpitations, orthopnea and leg swelling.   Gastrointestinal:  Positive for nausea (improving). Negative for abdominal pain, blood in stool, constipation, diarrhea, heartburn and vomiting.   Genitourinary:  Negative for dysuria, frequency, hematuria and urgency.   Musculoskeletal:  Negative for back pain, joint pain and myalgias.   Skin:  Positive for rash. Negative for itching.   Neurological:  Negative for dizziness, tingling, seizures, weakness and headaches.   Endo/Heme/Allergies:  Negative for polydipsia. Does not bruise/bleed easily.   Psychiatric/Behavioral:  Negative for depression and memory loss. The patient is nervous/anxious. The patient does not have insomnia.         Objective:     Vitals:     "09/21/23 1326   BP: 112/70   Pulse: 93   Resp: 12   Temp: 97 °F (36.1 °C)   TempSrc: Temporal   SpO2: 98%   Weight: 127.5 kg (281 lb 1.4 oz)   Height: 5' 6" (1.676 m)   PainSc: 0-No pain         Wt Readings from Last 10 Encounters:   09/21/23 127.5 kg (281 lb 1.4 oz)   09/08/23 128.2 kg (282 lb 10.1 oz)   09/07/23 128.2 kg (282 lb 10.1 oz)   09/06/23 129.3 kg (285 lb 0.9 oz)   08/24/23 132.1 kg (291 lb 3.6 oz)   08/24/23 132.1 kg (291 lb 3.6 oz)   08/21/23 131.3 kg (289 lb 7.4 oz)   08/14/23 130.3 kg (287 lb 4.2 oz)   07/31/23 129.5 kg (285 lb 7.9 oz)   07/31/23 129.5 kg (285 lb 7.9 oz)       Physical Examination:   Physical Exam  Vitals and nursing note reviewed.   Constitutional:       General: She is not in acute distress.     Appearance: She is not diaphoretic.   HENT:      Head: Normocephalic.      Mouth/Throat:      Pharynx: No oropharyngeal exudate.   Eyes:      General: No scleral icterus.     Conjunctiva/sclera: Conjunctivae normal.   Neck:      Thyroid: No thyromegaly.   Cardiovascular:      Rate and Rhythm: Normal rate and regular rhythm.      Heart sounds: Normal heart sounds. No murmur heard.  Pulmonary:      Effort: Pulmonary effort is normal. No respiratory distress.      Breath sounds: No stridor. No wheezing or rales.   Chest:      Chest wall: No tenderness.   Abdominal:      General: Bowel sounds are normal. There is no distension.      Palpations: Abdomen is soft. There is no mass.      Tenderness: There is no abdominal tenderness. There is no rebound.   Musculoskeletal:         General: No tenderness or deformity. Normal range of motion.      Cervical back: Neck supple.   Lymphadenopathy:      Cervical: No cervical adenopathy.   Skin:     General: Skin is warm and dry.      Findings: Rash (grade 1 facial rash, at baseline) present. No erythema.   Neurological:      Mental Status: She is alert and oriented to person, place, and time.      Cranial Nerves: No cranial nerve deficit.      " Coordination: Coordination normal.      Gait: Gait is intact.   Psychiatric:         Mood and Affect: Affect normal.         Cognition and Memory: Memory normal.         Judgment: Judgment normal.          Diagnostic Tests:  Significant Imaging: I have reviewed and interpreted all pertinent imaging results/findings.  Laboratory Data:  All pertinent labs have been reviewed.    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 Oxaliplatin was stopped due to severe adverse reaction.     Follow-up CTs and PET in May 2021 showed enlarging retroperitoneal node, concerning for metastatic disease.      She started FOLFIRI + Avastin and has continued till July 2022.   Given isolated RP toni disease, she has undergone open Left periaortic and aortocaval lymph node dissection on 7/12/2022. Resumed FOLFIRI+ Debo with relatively stable disease but now has disease progression.     She has been enrolled into  SWOG 2107 (encorafenib/cetuximab + nivo) , cleared for cycle 2, D1 today. On additional pre medications given infusion reaction to Cetuximab, continue to monitor closely.     Monitor closely. Follow up per research protocol.     Grade 1 dermatitis   Noted, initially started topical hydrocortisone and clindamycin gel, now on oral doxycycline.     Neoplasm  pain  Improving. Follow up with palliative care.     Hydronephrosis   Follow up with Urology for co management. No acute intervention was recommended.     Nausea   Continue compazine. Continue sacuso patch as needed.     Constipation  Start sennakot and daily Miralax.     Transaminitis  Fluctuates.  Continue to monitor. No obvious liver mets.     Hypercalcemia   Mild, secondary to hyperparathyroidism.  Avoid calcium supplements.     Hypothyroidism  Multinodular goiter   Continue Levothyroxine.  Had a non diagnostic biopsy in 2012, usg findings have remained stable. Reviewed repeat thyroid US, stable, will follow up with Endocrine.     Essential HTN   Continue antihypertensives.      Anxiety   Continue to  follow up with Onc psych.    MDM includes  :    - Acute or chronic illness or injury that poses a threat to life or bodily function  - Independent review and explanation of 3+ results from unique tests  - Discussion of management and ordering 3+ unique tests  - Extensive discussion of treatment and management  - Prescription drug management  - Drug therapy requiring intensive monitoring for toxicity          ECOG SCORE               Discussion:   No follow-ups on file.    Plan was discussed with the patient at length, and she verbalized understanding. Gail was given an opportunity to ask questions that were answered to her satisfaction, and she was advised to call in the interval if any problems or questions arise.    Electronically signed by Marah Santo MD        Route Chart for Scheduling    Med Onc Chart Routing      Follow up with physician . Scheduled   Follow up with [facility]    Infusion scheduling note    Injection scheduling note    Labs    Imaging    Pharmacy appointment    Other referrals                  Treatment Plan Information   [facility] - ARM 1 ENCORAFENIB  CETUXIMAB NIVOLUMAB   Marah Santo MD   Upcoming Treatment Dates - [facility] - ARM 1 ENCORAFENIB  CETUXIMAB NIVOLUMAB    9/22/2023        Pre-Medications       acetaminophen tablet 650 mg       diphenhydrAMINE (BENADRYL) 50 mg in NS 50 mL IVPB       hydrocortisone sodium succinate injection 50 mg       Chemotherapy       cetuximab (ERBITUX) 100 mg/50 mL chemo infusion 1,230 mg       INV nivolumab 480 mg in INV sodium chloride 0.9 % 100 mL chemo infusion  10/6/2023       Pre-Medications       acetaminophen tablet 650 mg       diphenhydrAMINE (BENADRYL) 50 mg in NS 50 mL IVPB       hydrocortisone sodium succinate injection 50 mg       Chemotherapy       cetuximab (ERBITUX) 100 mg/50 mL chemo infusion 1,230 mg  10/20/2023       Pre-Medications       diphenhydrAMINE (BENADRYL) 50 mg in NS 50 mL IVPB       acetaminophen tablet 650 mg       hydrocortisone sodium succinate injection 50 mg       Chemotherapy       cetuximab (ERBITUX) 100 mg/50 mL chemo infusion 1,230 mg       INV nivolumab 480 mg in INV sodium chloride 0.9 % 100 mL chemo infusion  11/3/2023       Pre-Medications       acetaminophen tablet 650 mg       diphenhydrAMINE (BENADRYL) 50 mg in NS 50 mL IVPB       hydrocortisone sodium succinate injection 50 mg       Chemotherapy       cetuximab (ERBITUX) 100 mg/50 mL chemo infusion 1,230 mg    Supportive Plan Information  IV FLUIDS AND ELECTROLYTES   Brayden Solo MD   Upcoming Treatment Dates - IV FLUIDS AND ELECTROLYTES    No upcoming days in selected categories.    Therapy Plan Information  PORT FLUSH  Flushes  heparin, porcine (PF) 100 unit/mL injection flush 500 Units  500 Units, Intravenous, Every visit  sodium chloride 0.9% flush 10 mL  10 mL, Intravenous, Every visit        Answers submitted by the patient for this visit:  Review of Systems Questionnaire (Submitted on 9/14/2023)  appetite change : No  unexpected weight change: No  mouth sores: No  visual disturbance: No  adenopathy: No

## 2023-09-22 ENCOUNTER — DOCUMENTATION ONLY (OUTPATIENT)
Dept: INFUSION THERAPY | Facility: HOSPITAL | Age: 53
End: 2023-09-22
Payer: MEDICAID

## 2023-09-22 ENCOUNTER — INFUSION (OUTPATIENT)
Dept: INFUSION THERAPY | Facility: HOSPITAL | Age: 53
End: 2023-09-22
Attending: INTERNAL MEDICINE
Payer: MEDICAID

## 2023-09-22 VITALS
DIASTOLIC BLOOD PRESSURE: 65 MMHG | BODY MASS INDEX: 45.17 KG/M2 | HEART RATE: 97 BPM | SYSTOLIC BLOOD PRESSURE: 114 MMHG | HEIGHT: 66 IN | RESPIRATION RATE: 16 BRPM | WEIGHT: 281.06 LBS | TEMPERATURE: 98 F

## 2023-09-22 DIAGNOSIS — C77.2 METASTASIS TO INTESTINAL LYMPH NODE: Primary | ICD-10-CM

## 2023-09-22 DIAGNOSIS — C18.7 MALIGNANT NEOPLASM OF SIGMOID COLON: ICD-10-CM

## 2023-09-22 PROCEDURE — 96415 CHEMO IV INFUSION ADDL HR: CPT | Mod: PN

## 2023-09-22 PROCEDURE — 63600175 PHARM REV CODE 636 W HCPCS: Mod: PN | Performed by: INTERNAL MEDICINE

## 2023-09-22 PROCEDURE — 96367 TX/PROPH/DG ADDL SEQ IV INF: CPT | Mod: PN

## 2023-09-22 PROCEDURE — 96417 CHEMO IV INFUS EACH ADDL SEQ: CPT | Mod: PN

## 2023-09-22 PROCEDURE — 96413 CHEMO IV INFUSION 1 HR: CPT | Mod: PN

## 2023-09-22 PROCEDURE — 96375 TX/PRO/DX INJ NEW DRUG ADDON: CPT | Mod: PN

## 2023-09-22 PROCEDURE — 25000003 PHARM REV CODE 250: Mod: PN | Performed by: INTERNAL MEDICINE

## 2023-09-22 RX ORDER — EPINEPHRINE 0.3 MG/.3ML
0.3 INJECTION SUBCUTANEOUS ONCE AS NEEDED
Status: DISCONTINUED | OUTPATIENT
Start: 2023-09-22 | End: 2023-09-22 | Stop reason: HOSPADM

## 2023-09-22 RX ORDER — HEPARIN 100 UNIT/ML
500 SYRINGE INTRAVENOUS
Status: DISCONTINUED | OUTPATIENT
Start: 2023-09-22 | End: 2023-09-22 | Stop reason: HOSPADM

## 2023-09-22 RX ORDER — DIPHENHYDRAMINE HYDROCHLORIDE 50 MG/ML
50 INJECTION INTRAMUSCULAR; INTRAVENOUS ONCE AS NEEDED
Status: DISCONTINUED | OUTPATIENT
Start: 2023-09-22 | End: 2023-09-22 | Stop reason: HOSPADM

## 2023-09-22 RX ORDER — ACETAMINOPHEN 325 MG/1
650 TABLET ORAL
Status: COMPLETED | OUTPATIENT
Start: 2023-09-22 | End: 2023-09-22

## 2023-09-22 RX ADMIN — HYDROCORTISONE SODIUM SUCCINATE 50 MG: 100 INJECTION, POWDER, FOR SOLUTION INTRAMUSCULAR; INTRAVENOUS at 08:09

## 2023-09-22 RX ADMIN — ONDANSETRON 8 MG: 2 INJECTION INTRAMUSCULAR; INTRAVENOUS at 09:09

## 2023-09-22 RX ADMIN — DIPHENHYDRAMINE HYDROCHLORIDE 50 MG: 50 INJECTION, SOLUTION INTRAMUSCULAR; INTRAVENOUS at 09:09

## 2023-09-22 RX ADMIN — SODIUM CHLORIDE: 9 INJECTION, SOLUTION INTRAVENOUS at 08:09

## 2023-09-22 RX ADMIN — Medication 500 UNITS: at 03:09

## 2023-09-22 RX ADMIN — CETUXIMAB 1200 MG: 2 SOLUTION INTRAVENOUS at 10:09

## 2023-09-22 RX ADMIN — ACETAMINOPHEN 650 MG: 325 TABLET ORAL at 08:09

## 2023-09-22 NOTE — PLAN OF CARE
Pt arrived to clinic today for Erbitux and Opdivo infusions and tolerated well. No changes throughout therapy. Pt aware of follow up appointments and side effects of drugs. Discharged to home. NAD.

## 2023-09-22 NOTE — PROGRESS NOTES
Oncology Nutrition   Gail Mckinnon   1970    Therapeutic Food Pantry     Pt eligible for [employer] . Order filled out with patient at chairside. All patient questions or concerns regarding  and/or nutrition status addressed. RD to continue to monitor and provide patient food while under active treatment PRN.     Food order filled by this RD- will provide to patient at end of infusion today.    Raquel Bullock MS, RD, LDN  09/22/2023  1:44 PM

## 2023-09-26 ENCOUNTER — PATIENT MESSAGE (OUTPATIENT)
Dept: PALLIATIVE MEDICINE | Facility: CLINIC | Age: 53
End: 2023-09-26
Payer: MEDICAID

## 2023-09-27 DIAGNOSIS — G47.00 INSOMNIA, UNSPECIFIED TYPE: Primary | ICD-10-CM

## 2023-09-27 RX ORDER — MIRTAZAPINE 7.5 MG/1
7.5 TABLET, FILM COATED ORAL NIGHTLY
Qty: 30 TABLET | Refills: 0 | Status: SHIPPED | OUTPATIENT
Start: 2023-09-27 | End: 2023-10-25 | Stop reason: SDUPTHER

## 2023-09-27 NOTE — TELEPHONE ENCOUNTER
LVM for pt to call the office.  According to Dr. Moss's last message on 8/22/23 pt is to be on 112 mcg - 2 tabs for total of 224 mcg daily.

## 2023-09-28 ENCOUNTER — TELEPHONE (OUTPATIENT)
Dept: ENDOCRINOLOGY | Facility: CLINIC | Age: 53
End: 2023-09-28
Payer: MEDICAID

## 2023-09-28 ENCOUNTER — LAB VISIT (OUTPATIENT)
Dept: LAB | Facility: HOSPITAL | Age: 53
End: 2023-09-28
Attending: INTERNAL MEDICINE
Payer: MEDICAID

## 2023-09-28 DIAGNOSIS — E03.8 OTHER SPECIFIED HYPOTHYROIDISM: ICD-10-CM

## 2023-09-28 DIAGNOSIS — E03.8 OTHER SPECIFIED HYPOTHYROIDISM: Primary | ICD-10-CM

## 2023-09-28 LAB — TSH SERPL DL<=0.005 MIU/L-ACNC: 0.53 UIU/ML (ref 0.4–4)

## 2023-09-28 PROCEDURE — 84443 ASSAY THYROID STIM HORMONE: CPT | Performed by: INTERNAL MEDICINE

## 2023-09-28 PROCEDURE — 36415 COLL VENOUS BLD VENIPUNCTURE: CPT | Mod: PN | Performed by: INTERNAL MEDICINE

## 2023-09-28 NOTE — TELEPHONE ENCOUNTER
----- Message from Shauna Rehman sent at 9/27/2023  4:35 PM CDT -----  Contact: pt [phone]  Type:  Patient Returning Call    Who Called:  Pt   Who Left Message for Patient:  Alba   Does the patient know what this is regarding?:  No   Best Call Back Number: [phone]      Additional Information:  Pls call back and advise

## 2023-09-28 NOTE — TELEPHONE ENCOUNTER
----- Message from Shauna Rehman sent at 9/27/2023  4:35 PM CDT -----  Contact: pt [phone]  Type:  Patient Returning Call    Who Called:  Pt   Who Left Message for Patient:  Alba   Does the patient know what this is regarding?:  No   Best Call Back Number: [phone]      Additional Information:  Pls call back and advise

## 2023-09-28 NOTE — TELEPHONE ENCOUNTER
Per Dr. Moss, will have pt get TSH today so that he can send in new RX accordingly based on levels.  Pt verb understanding.  Lab appt scheduled.

## 2023-09-28 NOTE — TELEPHONE ENCOUNTER
Rec'd refill request for levothyroxine yesterday for 200 mcg - 2 tabs daily.  Per Dr. Moss's note in Aug 2023, pt was to start 112 mcg - 2 tabs daily for total of 224 mcg.  Pt misunderstood and has been taking 2 - 200 mcg tabs daily.  She is now out of medication, did not take it this am.  Please advise.

## 2023-09-29 ENCOUNTER — TELEPHONE (OUTPATIENT)
Dept: ENDOCRINOLOGY | Facility: CLINIC | Age: 53
End: 2023-09-29
Payer: MEDICAID

## 2023-09-29 RX ORDER — LEVOTHYROXINE SODIUM 125 UG/1
250 TABLET ORAL
Qty: 60 TABLET | Refills: 11 | Status: SHIPPED | OUTPATIENT
Start: 2023-09-29 | End: 2023-11-06

## 2023-09-29 RX ORDER — LEVOTHYROXINE SODIUM 200 UG/1
200 TABLET ORAL DAILY
Qty: 90 TABLET | Refills: 1 | OUTPATIENT
Start: 2023-09-29

## 2023-09-29 NOTE — TELEPHONE ENCOUNTER
Patient is supposed to be on 224 mcg of Synthroid daily.  Appears was taking two 200 mcg tablets daily.    TSH not suppressed as would be expected.    Let us have her take 125 mcg tablets-take 2 tablets daily for a total of 250 mcg daily   Check TSH in 6 weeks.

## 2023-09-29 NOTE — TELEPHONE ENCOUNTER
Please review labs and send new RX for levothyroxine.  Has been on 400 mcg daily because pt misunderstood your last instructions to take 2 - 112 mcg tabs for 224 mcg daily.

## 2023-10-04 ENCOUNTER — LAB VISIT (OUTPATIENT)
Dept: LAB | Facility: HOSPITAL | Age: 53
End: 2023-10-04
Attending: INTERNAL MEDICINE
Payer: MEDICAID

## 2023-10-04 ENCOUNTER — PATIENT MESSAGE (OUTPATIENT)
Dept: HEMATOLOGY/ONCOLOGY | Facility: CLINIC | Age: 53
End: 2023-10-04
Payer: MEDICAID

## 2023-10-04 DIAGNOSIS — E03.8 OTHER SPECIFIED HYPOTHYROIDISM: ICD-10-CM

## 2023-10-04 DIAGNOSIS — C18.7 MALIGNANT NEOPLASM OF SIGMOID COLON: ICD-10-CM

## 2023-10-04 DIAGNOSIS — E83.52 HYPERCALCEMIA: ICD-10-CM

## 2023-10-04 DIAGNOSIS — Z00.6 EXAMINATION OF PARTICIPANT IN CLINICAL TRIAL: ICD-10-CM

## 2023-10-04 LAB
25(OH)D3+25(OH)D2 SERPL-MCNC: 18 NG/ML (ref 30–96)
ALBUMIN SERPL BCP-MCNC: 3.4 G/DL (ref 3.5–5.2)
ALBUMIN SERPL BCP-MCNC: 3.4 G/DL (ref 3.5–5.2)
ALP SERPL-CCNC: 103 U/L (ref 55–135)
ALT SERPL W/O P-5'-P-CCNC: 26 U/L (ref 10–44)
ANION GAP SERPL CALC-SCNC: 8 MMOL/L (ref 8–16)
ANION GAP SERPL CALC-SCNC: 8 MMOL/L (ref 8–16)
AST SERPL-CCNC: 20 U/L (ref 10–40)
BASOPHILS # BLD AUTO: 0.05 K/UL (ref 0–0.2)
BASOPHILS NFR BLD: 0.9 % (ref 0–1.9)
BILIRUB SERPL-MCNC: 0.4 MG/DL (ref 0.1–1)
BUN SERPL-MCNC: 15 MG/DL (ref 6–20)
BUN SERPL-MCNC: 15 MG/DL (ref 6–20)
CALCIUM SERPL-MCNC: 11 MG/DL (ref 8.7–10.5)
CALCIUM SERPL-MCNC: 11 MG/DL (ref 8.7–10.5)
CHLORIDE SERPL-SCNC: 108 MMOL/L (ref 95–110)
CHLORIDE SERPL-SCNC: 108 MMOL/L (ref 95–110)
CO2 SERPL-SCNC: 20 MMOL/L (ref 23–29)
CO2 SERPL-SCNC: 20 MMOL/L (ref 23–29)
CREAT SERPL-MCNC: 0.9 MG/DL (ref 0.5–1.4)
CREAT SERPL-MCNC: 0.9 MG/DL (ref 0.5–1.4)
DIFFERENTIAL METHOD: ABNORMAL
EOSINOPHIL # BLD AUTO: 0.5 K/UL (ref 0–0.5)
EOSINOPHIL NFR BLD: 8.7 % (ref 0–8)
ERYTHROCYTE [DISTWIDTH] IN BLOOD BY AUTOMATED COUNT: 14.5 % (ref 11.5–14.5)
EST. GFR  (NO RACE VARIABLE): >60 ML/MIN/1.73 M^2
EST. GFR  (NO RACE VARIABLE): >60 ML/MIN/1.73 M^2
GLUCOSE SERPL-MCNC: 93 MG/DL (ref 70–110)
GLUCOSE SERPL-MCNC: 93 MG/DL (ref 70–110)
HCT VFR BLD AUTO: 46.3 % (ref 37–48.5)
HGB BLD-MCNC: 14.7 G/DL (ref 12–16)
IMM GRANULOCYTES # BLD AUTO: 0.03 K/UL (ref 0–0.04)
IMM GRANULOCYTES NFR BLD AUTO: 0.5 % (ref 0–0.5)
LYMPHOCYTES # BLD AUTO: 1.3 K/UL (ref 1–4.8)
LYMPHOCYTES NFR BLD: 23.4 % (ref 18–48)
MAGNESIUM SERPL-MCNC: 1.7 MG/DL (ref 1.6–2.6)
MCH RBC QN AUTO: 28.3 PG (ref 27–31)
MCHC RBC AUTO-ENTMCNC: 31.7 G/DL (ref 32–36)
MCV RBC AUTO: 89 FL (ref 82–98)
MONOCYTES # BLD AUTO: 0.6 K/UL (ref 0.3–1)
MONOCYTES NFR BLD: 10.3 % (ref 4–15)
NEUTROPHILS # BLD AUTO: 3.1 K/UL (ref 1.8–7.7)
NEUTROPHILS NFR BLD: 56.2 % (ref 38–73)
NRBC BLD-RTO: 0 /100 WBC
PHOSPHATE SERPL-MCNC: 2.5 MG/DL (ref 2.7–4.5)
PLATELET # BLD AUTO: 301 K/UL (ref 150–450)
PMV BLD AUTO: 10.1 FL (ref 9.2–12.9)
POTASSIUM SERPL-SCNC: 4.4 MMOL/L (ref 3.5–5.1)
POTASSIUM SERPL-SCNC: 4.4 MMOL/L (ref 3.5–5.1)
PROT SERPL-MCNC: 6.7 G/DL (ref 6–8.4)
PTH-INTACT SERPL-MCNC: 127.2 PG/ML (ref 9–77)
RBC # BLD AUTO: 5.19 M/UL (ref 4–5.4)
SODIUM SERPL-SCNC: 136 MMOL/L (ref 136–145)
SODIUM SERPL-SCNC: 136 MMOL/L (ref 136–145)
WBC # BLD AUTO: 5.52 K/UL (ref 3.9–12.7)

## 2023-10-04 PROCEDURE — 85025 COMPLETE CBC W/AUTO DIFF WBC: CPT | Mod: PN | Performed by: INTERNAL MEDICINE

## 2023-10-04 PROCEDURE — 83735 ASSAY OF MAGNESIUM: CPT | Mod: PN | Performed by: INTERNAL MEDICINE

## 2023-10-04 PROCEDURE — 36415 COLL VENOUS BLD VENIPUNCTURE: CPT | Mod: PN | Performed by: INTERNAL MEDICINE

## 2023-10-04 PROCEDURE — 82306 VITAMIN D 25 HYDROXY: CPT | Performed by: INTERNAL MEDICINE

## 2023-10-04 PROCEDURE — 83970 ASSAY OF PARATHORMONE: CPT | Performed by: INTERNAL MEDICINE

## 2023-10-04 PROCEDURE — 80053 COMPREHEN METABOLIC PANEL: CPT | Mod: PN | Performed by: INTERNAL MEDICINE

## 2023-10-04 PROCEDURE — 84100 ASSAY OF PHOSPHORUS: CPT | Performed by: INTERNAL MEDICINE

## 2023-10-04 NOTE — PROGRESS NOTES
PSYCHO-ONCOLOGY INTAKE    Diagnostic Interview - CPT 68085    Date: 10/5/2023  Site: [facility]    Evaluation Length (direct face-to-face time):  45 minutes    This includes face to face time and non-face to face time preparing to see the patient, obtaining and/or reviewing separately obtained history, documenting clinical information in the electronic or other health record, independently interpreting results and communicating results to the patient/family/caregiver, or care coordinator.     Referral Source: Marky Palm PsyD   Oncologist: Marah Santo MD   PCP: Tima Mederos MD    Clinical status of patient: Outpatient    Gail Mckinnon, a 53 y.o. female, seen for initial evaluation visit.    Gail Mckinnon reviewed and agreed to informed consent and the limits of confidentiality.    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    Insomnia    Insomnia    Other constipation    Nausea and vomiting    Mucositis due to chemotherapy    Morbid obesity    Secondary adenocarcinoma of lymph node    Thyroid nodule    Hypercalcemia    Transaminitis    Hypophosphatemia    Functional diarrhea    Primary hypertension    Diarrhea of presumed infectious origin    Calculus of gallbladder without cholecystitis without obstruction    ACP (advance care planning)    Abdominal pain    Shortness of breath    Secondary malignant neoplasm of retroperitoneum    Depressive disorder    Hypothyroidism       Health Behaviors:       ETOH Use: Yes (rare)       Tobacco Use: No   Illicit Drug Use:  No     Prescription  Misuse:No   Caffeine: minimal    Exercise:The patient engages in environmental activity only. The patient is actively working to return to baseline exercise tolerance.   Firearms:  Yes, stored unloaded in a drawer   Advanced directives: Completed but need to be turned in     Family History:   Psychiatric illness: Yes, depression, schizophrenia     Alcohol/Drug Abuse: Yes, brothers and sons     Suicide: No      Past Psychiatric History:   Inpatient treatment: No     Outpatient treatment: Yes, with Dr. Palm     Prior substance abuse treatment: No     Suicide Attempts: No     Psychotropic Medications:  Current: Ativan (for sleep), Cymbalta and Wellbutrin       Past: Prozac, antidepressants (Effexor, Lexapro)    Current medications as per below, allergies reviewed in chart.    Current Outpatient Medications   Medication    acetaminophen (TYLENOL) 500 MG tablet    buPROPion (WELLBUTRIN SR) 150 MG TBSR 12 hr tablet    clindamycin phosphate 1% (CLINDAGEL) 1 % gel    doxycycline (VIBRA-TABS) 100 MG tablet    DULoxetine (CYMBALTA) 30 MG capsule    encorafenib 75 mg Cap    FLUoxetine (PROZAC) 20 MG capsule    granisetron (SANCUSO) 3.1 mg/24 hour    Lactobacillus rhamnosus GG (CULTURELLE) 10 billion cell capsule    levothyroxine (SYNTHROID) 125 MCG tablet    LIDOcaine-prilocaine (EMLA) cream    LORazepam (ATIVAN) 1 MG tablet    magic mouthwash diphen/antac/lidoc/nysta    mirtazapine (REMERON) 7.5 MG Tab    multivitamin (THERAGRAN) per tablet    olmesartan (BENICAR) 20 MG tablet    ondansetron (ZOFRAN-ODT) 8 MG TbDL    oxyCODONE-acetaminophen (PERCOCET)  mg per tablet    prochlorperazine (COMPAZINE) 10 MG tablet    senna-docusate 8.6-50 mg (PERICOLACE) 8.6-50 mg per tablet     No current facility-administered medications for this visit.              Social situation/Stressors: Gail Mckinnon lives with her son and daughter in [place].  She is currently on disability and looking for a part-time job. She  worked as an  for a [employer].  Gail Mckinnon was been  for 20 years and was  15 years ago (her ex- is ). She has three children. She does not have romantic partner at this time. The patient reports good social support.  Gail Mckinnon is an active member of the [employer].  Gail Mckinnon's hobbies include sewing, traveling and shopping.  Additional stressors: She takes responsibility for her children's problems which is causing her additional stress.     Strengths:Motivation, readiness for change, Interpersonal relationships and supports available - family, relatives, friends, and Cultural/spiritual/[employer]ian and community involvement  Liabilities: Financial strain    Current Evaluation:     Mental Status Exam: Gail Mckinnon arrived promptly for the assessment session.  The patient was fully cooperative throughout the interview and was an adequate historian   Appearance: age appropriate, appropriately  dressed, adequately  groomed  Behavior/Cooperation: friendly and cooperative  Speech: normal in rate, volume, and tone  Mood: euthymic  Affect: euthymic  Thought Process: goal-directed, logical  Thought Content: normal, no suicidality, no homicidality, delusions, or paranoia;did not appear to be responding to internal stimuli during the interview.   Orientation: grossly intact  Memory: grossly intact  Attention Span/Concentration: Attends to interview without distraction; reports no difficulty  Fund of Knowledge: average  Estimate of Intelligence: average from verbal skills and history  Cognition: grossly intact  Insight: patient has awareness of illness; good insight into own behavior and behavior of others  Judgment: the patient's behavior is adequate to circumstances      History of present illness:    Oncology History   Malignant neoplasm of sigmoid colon   3/16/2020 Initial Diagnosis    Malignant neoplasm of sigmoid  colon     3/31/2020 Cancer Staged    Staging form: Colon and Rectum, AJCC 8th Edition  - Clinical stage from 3/31/2020: Stage IIIC (cT4b, cN2a, cM0)     5/6/2020 - 11/17/2020 Chemotherapy    Treatment Summary   Plan Name: OP FOLFOX 6 Q2W  Treatment Goal: Curative  Status: Inactive  Start Date: 5/6/2020  End Date: 11/6/2020  Provider: Dylan Leyva MD  Chemotherapy: fluorouraciL injection 945 mg, 400 mg/m2 = 945 mg, Intravenous, Clinic/HOD 1 time, 14 of 14 cycles  Administration: 945 mg (5/6/2020), 945 mg (5/20/2020), 945 mg (6/3/2020), 945 mg (6/17/2020), 945 mg (7/29/2020), 945 mg (8/12/2020), 945 mg (8/26/2020), 945 mg (9/9/2020), 945 mg (9/23/2020), 945 mg (10/6/2020), 945 mg (10/21/2020), 945 mg (11/4/2020)  fluorouraciL 2,400 mg/m2 = 5,665 mg in sodium chloride 0.9% 240 mL chemo infusion, 2,400 mg/m2 = 5,665 mg, Intravenous, Over 46 hours, 14 of 14 cycles  Administration: 5,665 mg (5/6/2020), 5,665 mg (5/20/2020), 5,665 mg (6/3/2020), 5,665 mg (6/17/2020), 5,665 mg (7/29/2020), 5,665 mg (8/12/2020), 5,665 mg (8/26/2020), 5,665 mg (9/9/2020), 5,665 mg (9/23/2020), 5,665 mg (10/6/2020), 5,665 mg (10/21/2020), 5,665 mg (11/4/2020)  leucovorin calcium 900 mg in dextrose 5 % 250 mL infusion, 945 mg, Intravenous, Clinic/HOD 1 time, 14 of 14 cycles  Administration: 900 mg (5/6/2020), 900 mg (5/20/2020), 900 mg (6/3/2020), 900 mg (6/17/2020), 945 mg (6/30/2020), 945 mg (7/20/2020), 945 mg (7/29/2020), 945 mg (8/12/2020), 945 mg (8/26/2020), 945 mg (9/9/2020), 945 mg (9/23/2020), 945 mg (10/6/2020), 945 mg (10/21/2020), 945 mg (11/4/2020)  oxaliplatin (ELOXATIN) 200 mg in dextrose 5 % 500 mL chemo infusion, 201 mg, Intravenous, Clinic/\[facility] [place] [facility]\"" 1 time, 6 of 6 cycles  Dose modification: 65 mg/m2 (original dose 85 mg/m2, Cycle 6)  Administration: 200 mg (5/6/2020), 200 mg (5/20/2020), 200 mg (6/3/2020), 200 mg (6/17/2020), 201 mg (6/30/2020), 150 mg (7/20/2020)     5/3/2021 Cancer Staged    Staging form: Colon and Rectum,  AJCC 8th Edition  - Clinical stage from 5/3/2021: Stage Unknown (rcT0, cNX, cM1)     6/16/2021 - 8/2/2023 Chemotherapy    Treatment Summary   Plan Name: OP COLORECTAL FOLFIRI + BEVACIZUMAB Q2W  Treatment Goal: Control  Status: Inactive  Start Date: 6/16/2021  End Date: 8/2/2023  Provider: Marah Santo MD  Chemotherapy: fluorouraciL injection 990 mg, 400 mg/m2 = 990 mg, Intravenous, Clinic/[facility] 1 time, 15 of 15 cycles  Administration: 990 mg (6/16/2021), 990 mg (6/30/2021), 990 mg (7/14/2021), 980 mg (7/28/2021), 990 mg (8/11/2021), 990 mg (8/25/2021), 990 mg (9/8/2021), 990 mg (9/22/2021), 990 mg (10/6/2021), 990 mg (10/20/2021), 990 mg (11/3/2021), 990 mg (12/1/2021), 990 mg (12/15/2021), 990 mg (11/17/2021), 990 mg (12/28/2021)  fluorouraciL 2,400 mg/m2 = 5,950 mg in sodium chloride 0.9% 240 mL chemo infusion, 2,400 mg/m2 = 5,950 mg, Intravenous, Over 46 hours, 43 of 46 cycles  Administration: 5,950 mg (6/16/2021), 5,950 mg (6/30/2021), 5,950 mg (7/14/2021), 5,880 mg (7/28/2021), 5,930 mg (8/11/2021), 5,930 mg (8/25/2021), 5,930 mg (9/8/2021), 5,930 mg (9/22/2021), 5,930 mg (10/6/2021), 5,930 mg (10/20/2021), 5,930 mg (11/3/2021), 5,930 mg (12/1/2021), 5,930 mg (12/15/2021), 5,930 mg (11/17/2021), 5,930 mg (12/28/2021), 6,050 mg (5/11/2022), 6,025 mg (3/9/2022), 6,025 mg (2/23/2022), 5,930 mg (2/2/2022), 5,930 mg (1/19/2022), 6,050 mg (4/20/2022), 6,025 mg (4/6/2022), 6,025 mg (3/23/2022), 6,050 mg (6/1/2022), 6,050 mg (6/15/2022), 6,050 mg (10/19/2022), 6,050 mg (10/5/2022), 6,050 mg (11/2/2022), 6,050 mg (11/16/2022), 6,050 mg (11/30/2022), 6,050 mg (1/4/2023), 6,050 mg (12/19/2022), 6,050 mg (1/18/2023), 6,000 mg (5/22/2023), 6,000 mg (4/26/2023), 6,000 mg (4/11/2023), 6,000 mg (2/28/2023), 6,050 mg (2/14/2023), 6,000 mg (2/1/2023), 6,000 mg (7/5/2023), 6,000 mg (6/19/2023), 6,000 mg (6/5/2023), 6,000 mg (7/31/2023)  bevacizumab (AVASTIN) 600 mg in sodium chloride 0.9% 100 mL chemo infusion, 660 mg,  Intravenous, Clinic/HOD 1 time, 36 of 36 cycles  Dose modification: 5 mg/kg (original dose 5 mg/kg, Cycle 26, Reason: MD Discretion, Comment: 5 mg/kg original dose)  Administration: 600 mg (6/30/2021), 600 mg (7/14/2021), 600 mg (7/28/2021), 600 mg (6/16/2021), 600 mg (8/11/2021), 655 mg (8/25/2021), 600 mg (9/8/2021), 600 mg (9/22/2021), 600 mg (10/6/2021), 600 mg (10/20/2021), 600 mg (11/3/2021), 655 mg (12/1/2021), 600 mg (12/15/2021), 600 mg (11/17/2021), 600 mg (12/28/2021), 600 mg (5/11/2022), 600 mg (3/9/2022), 600 mg (2/23/2022), 600 mg (2/2/2022), 600 mg (1/19/2022), 600 mg (4/20/2022), 680 mg (4/6/2022), 600 mg (3/23/2022), 680 mg (10/5/2022), 680 mg (10/19/2022), 680 mg (11/2/2022), 680 mg (11/16/2022), 680 mg (11/30/2022), 680 mg (1/4/2023), 680 mg (12/19/2022), 680 mg (1/18/2023), 680 mg (3/28/2023), 680 mg (3/14/2023), 680 mg (2/28/2023), 680 mg (2/14/2023), 680 mg (2/1/2023)  irinotecan (CAMPTOSAR) 440 mg in sodium chloride 0.9% 500 mL chemo infusion, 446 mg, Intravenous, Clinic/HOD 1 time, 45 of 48 cycles  Dose modification: 180 mg/m2 (original dose 180 mg/m2, Cycle 24)  Administration: 440 mg (6/16/2021), 440 mg (6/30/2021), 440 mg (7/14/2021), 440 mg (7/28/2021), 440 mg (8/11/2021), 444 mg (8/25/2021), 440 mg (9/8/2021), 440 mg (9/22/2021), 440 mg (10/6/2021), 440 mg (10/20/2021), 440 mg (11/3/2021), 440 mg (12/1/2021), 440 mg (12/15/2021), 440 mg (11/17/2021), 440 mg (12/28/2021), 440 mg (5/11/2022), 440 mg (3/9/2022), 440 mg (2/23/2022), 440 mg (2/2/2022), 440 mg (1/19/2022), 440 mg (4/20/2022), 440 mg (4/6/2022), 440 mg (3/24/2022), 440 mg (6/1/2022), 440 mg (6/15/2022), 440 mg (10/19/2022), 440 mg (10/5/2022), 440 mg (11/2/2022), 440 mg (11/16/2022), 440 mg (11/30/2022), 440 mg (1/4/2023), 440 mg (12/19/2022), 440 mg (1/18/2023), 440 mg (5/22/2023), 440 mg (4/26/2023), 440 mg (4/11/2023), 440 mg (3/28/2023), 440 mg (3/14/2023), 440 mg (2/28/2023), 440 mg (2/14/2023), 440 mg (2/1/2023), 440 mg  (7/5/2023), 440 mg (6/19/2023), 440 mg (6/5/2023), 440 mg (7/31/2023)  bevacizumab-awwb (MVASI) 5 mg/kg = 680 mg in sodium chloride 0.9% 100 mL infusion, 5 mg/kg = 680 mg (100 % of original dose 5 mg/kg), Intravenous, Clinic/HOD 1 time, 7 of 10 cycles  Dose modification: 5 mg/kg (original dose 5 mg/kg, Cycle 39)  Administration: 680 mg (4/11/2023), 680 mg (4/26/2023), 680 mg (5/22/2023), 680 mg (7/5/2023), 680 mg (6/19/2023), 680 mg (6/5/2023), 680 mg (7/31/2023)     8/17/2023 - 8/18/2023 Chemotherapy    Treatment Summary   Plan Name: OP CETUXIMAB 500MG Q2W  Treatment Goal: Control  Status: Inactive  Start Date:   End Date:   Provider: Marah Santo MD  Chemotherapy: [No matching medication found in this treatment plan]     8/24/2023 -  Chemotherapy    Treatment Summary   Plan Name: [facility] - ARM 1 ENCORAFENIB  CETUXIMAB NIVOLUMAB  Treatment Goal: Palliative  Status: Active  Start Date: 8/24/2023  End Date: 7/12/2024 (Planned)  Provider: Marah Santo MD  Chemotherapy: cetuximab (ERBITUX) 100 mg/50 mL chemo infusion 1,200 mg, 1,230 mg, Intravenous, Clinic/HOD 1 time, 2 of 12 cycles  Administration: 1,200 mg (8/24/2023), 1,200 mg (9/8/2023), 1,200 mg (9/22/2023)  encorafenib 75 mg Cap, 300 mg, Oral, Daily, 1 of 1 cycle, Start date: --, End date: --     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   4/3/2020 Initial Diagnosis    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     5/6/2020 - 11/17/2020 Chemotherapy    Treatment Summary   Plan Name: OP FOLFOX 6 Q2W  Treatment Goal: Curative  Status: Inactive  Start Date: 5/6/2020  End Date: 11/6/2020  Provider: Dylan Leyva MD  Chemotherapy: fluorouraciL injection 945 mg, 400 mg/m2 = 945 mg, Intravenous, Clinic/\[facility] [place] [facility]\"" 1 time, 14 of 14 cycles  Administration: 945 mg (5/6/2020), 945 mg (5/20/2020), 945 mg (6/3/2020), 945 mg (6/17/2020), 945 mg (7/29/2020), 945 mg (8/12/2020), 945 mg (8/26/2020), 945 mg (9/9/2020), 945 mg (9/23/2020), 945 mg (10/6/2020), 945 mg (10/21/2020), 945 mg  (11/4/2020)  fluorouraciL 2,400 mg/m2 = 5,665 mg in sodium chloride 0.9% 240 mL chemo infusion, 2,400 mg/m2 = 5,665 mg, Intravenous, Over 46 hours, 14 of 14 cycles  Administration: 5,665 mg (5/6/2020), 5,665 mg (5/20/2020), 5,665 mg (6/3/2020), 5,665 mg (6/17/2020), 5,665 mg (7/29/2020), 5,665 mg (8/12/2020), 5,665 mg (8/26/2020), 5,665 mg (9/9/2020), 5,665 mg (9/23/2020), 5,665 mg (10/6/2020), 5,665 mg (10/21/2020), 5,665 mg (11/4/2020)  leucovorin calcium 900 mg in dextrose 5 % 250 mL infusion, 945 mg, Intravenous, Clinic/HOD 1 time, 14 of 14 cycles  Administration: 900 mg (5/6/2020), 900 mg (5/20/2020), 900 mg (6/3/2020), 900 mg (6/17/2020), 945 mg (6/30/2020), 945 mg (7/20/2020), 945 mg (7/29/2020), 945 mg (8/12/2020), 945 mg (8/26/2020), 945 mg (9/9/2020), 945 mg (9/23/2020), 945 mg (10/6/2020), 945 mg (10/21/2020), 945 mg (11/4/2020)  oxaliplatin (ELOXATIN) 200 mg in dextrose 5 % 500 mL chemo infusion, 201 mg, Intravenous, Clinic/HOD 1 time, 6 of 6 cycles  Dose modification: 65 mg/m2 (original dose 85 mg/m2, Cycle 6)  Administration: 200 mg (5/6/2020), 200 mg (5/20/2020), 200 mg (6/3/2020), 200 mg (6/17/2020), 201 mg (6/30/2020), 150 mg (7/20/2020)     6/16/2021 - 8/2/2023 Chemotherapy    Treatment Summary   Plan Name: OP COLORECTAL FOLFIRI + BEVACIZUMAB Q2W  Treatment Goal: Control  Status: Inactive  Start Date: 6/16/2021  End Date: 8/2/2023  Provider: Marah Santo MD  Chemotherapy: fluorouraciL injection 990 mg, 400 mg/m2 = 990 mg, Intravenous, Clinic/HOD 1 time, 15 of 15 cycles  Administration: 990 mg (6/16/2021), 990 mg (6/30/2021), 990 mg (7/14/2021), 980 mg (7/28/2021), 990 mg (8/11/2021), 990 mg (8/25/2021), 990 mg (9/8/2021), 990 mg (9/22/2021), 990 mg (10/6/2021), 990 mg (10/20/2021), 990 mg (11/3/2021), 990 mg (12/1/2021), 990 mg (12/15/2021), 990 mg (11/17/2021), 990 mg (12/28/2021)  fluorouraciL 2,400 mg/m2 = 5,950 mg in sodium chloride 0.9% 240 mL chemo infusion, 2,400 mg/m2 = 5,950 mg,  Intravenous, Over 46 hours, 43 of 46 cycles  Administration: 5,950 mg (6/16/2021), 5,950 mg (6/30/2021), 5,950 mg (7/14/2021), 5,880 mg (7/28/2021), 5,930 mg (8/11/2021), 5,930 mg (8/25/2021), 5,930 mg (9/8/2021), 5,930 mg (9/22/2021), 5,930 mg (10/6/2021), 5,930 mg (10/20/2021), 5,930 mg (11/3/2021), 5,930 mg (12/1/2021), 5,930 mg (12/15/2021), 5,930 mg (11/17/2021), 5,930 mg (12/28/2021), 6,050 mg (5/11/2022), 6,025 mg (3/9/2022), 6,025 mg (2/23/2022), 5,930 mg (2/2/2022), 5,930 mg (1/19/2022), 6,050 mg (4/20/2022), 6,025 mg (4/6/2022), 6,025 mg (3/23/2022), 6,050 mg (6/1/2022), 6,050 mg (6/15/2022), 6,050 mg (10/19/2022), 6,050 mg (10/5/2022), 6,050 mg (11/2/2022), 6,050 mg (11/16/2022), 6,050 mg (11/30/2022), 6,050 mg (1/4/2023), 6,050 mg (12/19/2022), 6,050 mg (1/18/2023), 6,000 mg (5/22/2023), 6,000 mg (4/26/2023), 6,000 mg (4/11/2023), 6,000 mg (2/28/2023), 6,050 mg (2/14/2023), 6,000 mg (2/1/2023), 6,000 mg (7/5/2023), 6,000 mg (6/19/2023), 6,000 mg (6/5/2023), 6,000 mg (7/31/2023)  bevacizumab (AVASTIN) 600 mg in sodium chloride 0.9% 100 mL chemo infusion, 660 mg, Intravenous, [facility] [place] 1 time, 36 of 36 cycles  Dose modification: 5 mg/kg (original dose 5 mg/kg, Cycle 26, Reason: MD Discretion, Comment: 5 mg/kg original dose)  Administration: 600 mg (6/30/2021), 600 mg (7/14/2021), 600 mg (7/28/2021), 600 mg (6/16/2021), 600 mg (8/11/2021), 655 mg (8/25/2021), 600 mg (9/8/2021), 600 mg (9/22/2021), 600 mg (10/6/2021), 600 mg (10/20/2021), 600 mg (11/3/2021), 655 mg (12/1/2021), 600 mg (12/15/2021), 600 mg (11/17/2021), 600 mg (12/28/2021), 600 mg (5/11/2022), 600 mg (3/9/2022), 600 mg (2/23/2022), 600 mg (2/2/2022), 600 mg (1/19/2022), 600 mg (4/20/2022), 680 mg (4/6/2022), 600 mg (3/23/2022), 680 mg (10/5/2022), 680 mg (10/19/2022), 680 mg (11/2/2022), 680 mg (11/16/2022), 680 mg (11/30/2022), 680 mg (1/4/2023), 680 mg (12/19/2022), 680 mg (1/18/2023), 680 mg (3/28/2023), 680 mg (3/14/2023), 680 mg  (2/28/2023), 680 mg (2/14/2023), 680 mg (2/1/2023)  irinotecan (CAMPTOSAR) 440 mg in sodium chloride 0.9% 500 mL chemo infusion, 446 mg, Intravenous, [facility] 1 time, 45 of 48 cycles  Dose modification: 180 mg/m2 (original dose 180 mg/m2, Cycle 24)  Administration: 440 mg (6/16/2021), 440 mg (6/30/2021), 440 mg (7/14/2021), 440 mg (7/28/2021), 440 mg (8/11/2021), 444 mg (8/25/2021), 440 mg (9/8/2021), 440 mg (9/22/2021), 440 mg (10/6/2021), 440 mg (10/20/2021), 440 mg (11/3/2021), 440 mg (12/1/2021), 440 mg (12/15/2021), 440 mg (11/17/2021), 440 mg (12/28/2021), 440 mg (5/11/2022), 440 mg (3/9/2022), 440 mg (2/23/2022), 440 mg (2/2/2022), 440 mg (1/19/2022), 440 mg (4/20/2022), 440 mg (4/6/2022), 440 mg (3/24/2022), 440 mg (6/1/2022), 440 mg (6/15/2022), 440 mg (10/19/2022), 440 mg (10/5/2022), 440 mg (11/2/2022), 440 mg (11/16/2022), 440 mg (11/30/2022), 440 mg (1/4/2023), 440 mg (12/19/2022), 440 mg (1/18/2023), 440 mg (5/22/2023), 440 mg (4/26/2023), 440 mg (4/11/2023), 440 mg (3/28/2023), 440 mg (3/14/2023), 440 mg (2/28/2023), 440 mg (2/14/2023), 440 mg (2/1/2023), 440 mg (7/5/2023), 440 mg (6/19/2023), 440 mg (6/5/2023), 440 mg (7/31/2023)  bevacizumab-awwb (MVASI) 5 mg/kg = 680 mg in sodium chloride 0.9% 100 mL infusion, 5 mg/kg = 680 mg (100 % of original dose 5 mg/kg), Intravenous, Clinic/[facility] 1 time, 7 of 10 cycles  Dose modification: 5 mg/kg (original dose 5 mg/kg, Cycle 39)  Administration: 680 mg (4/11/2023), 680 mg (4/26/2023), 680 mg (5/22/2023), 680 mg (7/5/2023), 680 mg (6/19/2023), 680 mg (6/5/2023), 680 mg (7/31/2023)     8/17/2023 - 8/18/2023 Chemotherapy    Treatment Summary   Plan Name: OP CETUXIMAB 500MG Q2W  Treatment Goal: Control  Status: Inactive  Start Date:   End Date:   Provider: Marah Santo MD  Chemotherapy: [No matching medication found in this treatment plan]     8/24/2023 -  Chemotherapy    Treatment Summary   Plan Name: [facility] - ARM 1 ENCORAFENIB  CETUXIMAB  NIVOLUMAB  Treatment Goal: Palliative  Status: Active  Start Date: 8/24/2023  End Date: 7/12/2024 (Planned)  Provider: Marah Santo MD  Chemotherapy: cetuximab (ERBITUX) 100 mg/50 mL chemo infusion 1,200 mg, 1,230 mg, Intravenous, Clinic/HOD 1 time, 2 of 12 cycles  Administration: 1,200 mg (8/24/2023), 1,200 mg (9/8/2023), 1,200 mg (9/22/2023)  encorafenib 75 mg Cap, 300 mg, Oral, Daily, 1 of 1 cycle, Start date: --, End date: --           Gail Mckinnon has adjusted to illness with moderate difficulty primarily through passive coping strategies. Currently, she is experiencing feelings of anger and is irritable. She has engaged in appropriate information gathering.  The patient has good family/friend support (mom, daughter, 2 friends).  Her support system is coping marginally with the diagnosis/treatment/prognosis. Illness-related psychosocial stressors include financial strain , difficulty meeting family responsibilities, and changes in ability to engage in leisure activities.  The patient has a good partnership with her [facility] treatment team. The patient reports the following barriers to cancer care:financial limitations.     NCCN Distress thermometer:       10/3/2023     3:43 PM 9/14/2023     4:44 PM 9/7/2023     1:00 PM 9/6/2023     9:26 AM 8/21/2023     4:58 PM 8/21/2023     2:04 PM 8/14/2023     9:51 AM   DISTRESS SCREENING   Distress Score 4 5 5 5    5 6    6 5 6   Practical Problems Insurance/Financial Insurance/Financial Insurance/Financial Insurance/Financial;Work/School    Insurance/Financial;Work/School Insurance/Financial;Treatment Decisions    Insurance/Financial;Treatment Decisions Insurance/Financial;Treatment Decisions Treatment Decisions;Insurance/Financial   Family Problems Dealing with Children;Family Health Issues Dealing with Children Dealing with Children Dealing with Children;Family Health Issues    Dealing with Children;Family Health Issues Dealing with Children     Dealing with Children Dealing with Children Dealing with Children   Emotional Problems Depression;Sadness;Worry Depression;Sadness;Worry Depression;Fears;Worry Depression;Fears;Sadness;Worry    Depression;Fears;Sadness;Worry Depression;Fears;Sadness;Worry    Depression;Fears;Sadness;Worry Depression;Fears;Sadness;Worry Depression;Fears;Loss of Interest   Spiritual / Buddhist Concerns No No No No    No No    No No No   Physical Problems Fatigue;Sleep Fatigue Sleep Appearance;Fatigue;Skin Dry/Itchy    Appearance;Fatigue;Skin Dry/Itchy Fatigue;Sleep;Tingling in hands or feet    Fatigue;Sleep;Tingling in hands or feet Pain;Sleep;Tingling in hands or feet;Fatigue Pain   Other Problems       pain in left kidney        Symptoms:   Mood: depressed mood, worthlessness/guilt, fatigue and social isolation;  no SI/HI  Anxiety: related to the cleanliness of her home, feels reslessno prior  Substance abuse: denied  Cognitive functioning: denied  Health behaviors: noncontributory  Sexual/Intimacy: She feels lonely and wants a romantic relationship. However, she does not think someone would want to be with someone who has cancer.   Sleep: no difficulties; sleeps approximately 7-8 hours per day, naps for 1-2 hours  Pain: Ms. Mckinnon reports leg (feet and knee) pain that comes and goes. She is unsure what caused the pain and what alleviates the pain.     Assessment - Diagnosis - Goals:     No diagnosis found.      Plan:individual psychotherapy    Summary and Recommendations  Gail Mckinnon is a 53 y.o. female referred by Marky Palm PsyD for psychological evaluation and treatment.  Ms. Mckinnon appears to be coping with moderate difficulty with her diagnosis and proposed treatment course.  She is interested in CBT to address interpersonal difficulties and cancer coping skills and will follow up with me for that purpose.    Return to clinic: 2 weeks    GOALS:   Improve communication with children  Develop coping skills  for negative emotions (anger, sadness)                      Myla Gallagher, PhD  Clinical Health Psychology Fellow

## 2023-10-05 ENCOUNTER — OFFICE VISIT (OUTPATIENT)
Dept: PSYCHIATRY | Facility: CLINIC | Age: 53
End: 2023-10-05
Payer: MEDICAID

## 2023-10-05 ENCOUNTER — RESEARCH ENCOUNTER (OUTPATIENT)
Dept: RESEARCH | Facility: HOSPITAL | Age: 53
End: 2023-10-05
Payer: MEDICAID

## 2023-10-05 ENCOUNTER — OFFICE VISIT (OUTPATIENT)
Dept: HEMATOLOGY/ONCOLOGY | Facility: CLINIC | Age: 53
End: 2023-10-05
Payer: MEDICAID

## 2023-10-05 VITALS
RESPIRATION RATE: 16 BRPM | SYSTOLIC BLOOD PRESSURE: 108 MMHG | TEMPERATURE: 97 F | HEIGHT: 66 IN | OXYGEN SATURATION: 99 % | DIASTOLIC BLOOD PRESSURE: 64 MMHG | HEART RATE: 96 BPM | WEIGHT: 284.19 LBS | BODY MASS INDEX: 45.67 KG/M2

## 2023-10-05 DIAGNOSIS — C77.9 SECONDARY ADENOCARCINOMA OF LYMPH NODE: ICD-10-CM

## 2023-10-05 DIAGNOSIS — C78.6 SECONDARY MALIGNANT NEOPLASM OF RETROPERITONEUM: ICD-10-CM

## 2023-10-05 DIAGNOSIS — R11.2 NAUSEA AND VOMITING, UNSPECIFIED VOMITING TYPE: ICD-10-CM

## 2023-10-05 DIAGNOSIS — E03.9 HYPOTHYROIDISM, UNSPECIFIED TYPE: ICD-10-CM

## 2023-10-05 DIAGNOSIS — C18.7 MALIGNANT NEOPLASM OF SIGMOID COLON: ICD-10-CM

## 2023-10-05 DIAGNOSIS — R74.01 TRANSAMINITIS: ICD-10-CM

## 2023-10-05 DIAGNOSIS — E04.2 MULTINODULAR GOITER: ICD-10-CM

## 2023-10-05 DIAGNOSIS — F32.A DEPRESSIVE DISORDER: ICD-10-CM

## 2023-10-05 DIAGNOSIS — C18.7 MALIGNANT NEOPLASM OF SIGMOID COLON: Primary | ICD-10-CM

## 2023-10-05 DIAGNOSIS — F43.29 ADJUSTMENT DISORDER WITH OTHER SYMPTOM: Primary | ICD-10-CM

## 2023-10-05 DIAGNOSIS — K59.1 FUNCTIONAL DIARRHEA: ICD-10-CM

## 2023-10-05 PROCEDURE — 3074F SYST BP LT 130 MM HG: CPT | Mod: CPTII,,, | Performed by: INTERNAL MEDICINE

## 2023-10-05 PROCEDURE — 4010F PR ACE/ARB THEARPY RXD/TAKEN: ICD-10-PCS | Mod: HB,CPTII,,

## 2023-10-05 PROCEDURE — 3078F DIAST BP <80 MM HG: CPT | Mod: CPTII,,, | Performed by: INTERNAL MEDICINE

## 2023-10-05 PROCEDURE — 90791 PR PSYCHIATRIC DIAGNOSTIC EVALUATION: ICD-10-PCS | Mod: AH,HB,,

## 2023-10-05 PROCEDURE — 99215 PR OFFICE/OUTPT VISIT, EST, LEVL V, 40-54 MIN: ICD-10-PCS | Mod: S$PBB,,, | Performed by: INTERNAL MEDICINE

## 2023-10-05 PROCEDURE — 99214 OFFICE O/P EST MOD 30 MIN: CPT | Mod: PBBFAC,27,PN | Performed by: INTERNAL MEDICINE

## 2023-10-05 PROCEDURE — 3008F PR BODY MASS INDEX (BMI) DOCUMENTED: ICD-10-PCS | Mod: CPTII,,, | Performed by: INTERNAL MEDICINE

## 2023-10-05 PROCEDURE — 99999 PR PBB SHADOW E&M-EST. PATIENT-LVL IV: ICD-10-PCS | Mod: PBBFAC,,, | Performed by: INTERNAL MEDICINE

## 2023-10-05 PROCEDURE — 99999 PR PBB SHADOW E&M-EST. PATIENT-LVL I: CPT | Mod: PBBFAC,HB,,

## 2023-10-05 PROCEDURE — 3008F BODY MASS INDEX DOCD: CPT | Mod: CPTII,,, | Performed by: INTERNAL MEDICINE

## 2023-10-05 PROCEDURE — 99999 PR PBB SHADOW E&M-EST. PATIENT-LVL IV: CPT | Mod: PBBFAC,,, | Performed by: INTERNAL MEDICINE

## 2023-10-05 PROCEDURE — 4010F PR ACE/ARB THEARPY RXD/TAKEN: ICD-10-PCS | Mod: CPTII,,, | Performed by: INTERNAL MEDICINE

## 2023-10-05 PROCEDURE — 4010F ACE/ARB THERAPY RXD/TAKEN: CPT | Mod: HB,CPTII,,

## 2023-10-05 PROCEDURE — 4010F ACE/ARB THERAPY RXD/TAKEN: CPT | Mod: CPTII,,, | Performed by: INTERNAL MEDICINE

## 2023-10-05 PROCEDURE — 3078F PR MOST RECENT DIASTOLIC BLOOD PRESSURE < 80 MM HG: ICD-10-PCS | Mod: CPTII,,, | Performed by: INTERNAL MEDICINE

## 2023-10-05 PROCEDURE — 99211 OFF/OP EST MAY X REQ PHY/QHP: CPT | Mod: PBBFAC,PN

## 2023-10-05 PROCEDURE — 99999 PR PBB SHADOW E&M-EST. PATIENT-LVL I: ICD-10-PCS | Mod: PBBFAC,HB,,

## 2023-10-05 PROCEDURE — 90791 PSYCH DIAGNOSTIC EVALUATION: CPT | Mod: AH,HB,,

## 2023-10-05 PROCEDURE — 99215 OFFICE O/P EST HI 40 MIN: CPT | Mod: S$PBB,,, | Performed by: INTERNAL MEDICINE

## 2023-10-05 PROCEDURE — 3074F PR MOST RECENT SYSTOLIC BLOOD PRESSURE < 130 MM HG: ICD-10-PCS | Mod: CPTII,,, | Performed by: INTERNAL MEDICINE

## 2023-10-05 NOTE — PROGRESS NOTES
Gail Mckinnon, MRN [id number]  PID# [id number]     Protocol: SWOG   IRB#: 2023.047  : NGUYEN Degroot MD  Treating Investigator: JESUS Santo MD     Randomized Phase II Trial of Encorafenib and Cetuximab with or Without Nivolumab [place] [id number] for Patients with Previously Treated, Microsatellite Stable BRAF V600E Metastatic and/or Unresectable Colorectal Cancer   Cycle 1 Day 15 Arm 1/ Nivolumab + Cetuximab + Encorafenib therapy.     05OCT2023     Cycle 2 Day 14    C2 Day 15 Cetuximab only- Planned for tomorrow 06OCT2023.      The patient presented to the planned office visit alone. The patient was alert, oriented, without noted distress, with appropriate mood and affect for the situation, and freely agreed to continue with participating in the referenced study.    The IRB approved amended informed consent form for Protocol Version 08/10/2023 was reviewed with the patient. The additional safety information concerning an additional rare, but serious risk associated with Nivolumab was reviewed with the patient, all questions were answered to the patient's satisfaction, and the patient freely signed the additional consent. A signed copy was given to the patient. Consent linked at this end of this note.    Pt completed the required pre-tx labs on 04 OCT 2023. CBC w/ diff, CMP, Magnesium- all were resulted prior to visit. Including Endocrinologist ordered labs.  Protocol does not require TSH at this time point. Endocrinology following TSH for patient's baseline Grade II Hypothyroidism. See communication from study chair regarding TSH monitoring for this patient in Cycle 1 section of shadow chart.     [id number] TSH- elevated @ 8.7uIU/ml [0.400-4.000 uIU/mL]   Baseline drawn r/t patient's diagnosis of hypothyroidism and planned Nivolumab; T4, Free @ 0.99 ng/dL [0.71- 1.51 ng/dL]    [id number] PTH elevated @ 217.8 pg/mL [9.0-77.0 pg/mL]- Baseline obtained/ drawn r/t hypercalcemia (see corrected calcium source doc)  and history of elevated PTH 9/2021 and diagnosis of Multinodular Goiter.  04OCT2023 PTH ordered per endocrinologist-monitoring Baseline Hyperthyroidism. PTH decreased to 127.2 pg/mL. Remains elevated without change in grade. Endocrinologist following patient with planned office visit for 30OCT2023. The treating MD is aware, does not relate to therapy, does not deem CS, no new orders given.   04OCT2023 Corrected calcium calculated as 11.48 mg/dL.    21AUG2023, the patient's endocrinologist increased dose from 200mcg Daily to 224 mcg Daily with a repeat TSH, Renal Panel, PTH, Vit D and 24 hr urine Ca/Cr in 6 weeks (~ October 5th, 2023).   Dr. Santo is aware of increased dosing of Synthroid for Grade 2 BASELINE Hypothyroidism does not deem related to protocol treatment, nor CS, no new orders given.   See printed source docs under C1 D1 CRC note.  Pt. admitted to starting increased dose on 23AUG2023.  04OCT2023- CRC phoned and spoke to patient concerning 29SEP2023 Telephone Encounter by endocrine concerning incorrect Levothyroxine dosing by the patient. The patient admits that she had been taking 400mcg of Levothyroxine QD starting on 23AUG2023 versus the intended/prescribed dose of 224mcg QD. [facility] list updated-initial entry for Levothyroxine 224mcg QD starting 8/23/2023 lined through with separate entry to document the dates the pt. took the 400mcg doses. Prescribed dose of 250mcg started 03OCT2023 per Dr. Moss following 28SEP2023 reported TSH. Noted on patient's [facility] list.      [facility] list reviewed with the patient for accuracy- see shadow chart for changes.     Patient admits to being compliant with QD dosing of encorafenib and the use of the protocol's pill diary.   The patient verbalized understanding to return remaining encorafenib and protocol pill diary to 19OCT2023 office visit.    * Last dose of Encorafenib from this refill/supply is expected to take place on Thursday 19OCT2023 (C2D28)   Pt. made aware  to call this CRC if she does not receive communication for next Encorafenib refill from [facility] by second week in October.   The patient verbalized understanding to take Encorafenib #4 capsule with water in the morning at the same time each day. Pt. also agreed to taking before leaving home on the days of infusional therapy to provide one hour time frame before receiving oral pre-medications.    Baseline AEs:  *See shadow chart for full list of Baseline AEs.    Baseline Hypothyroidism- Grade 2 (Start date 2012-continues) The treating MD is aware on increased dosing, does not deem CS at this point, no new orders given-will continue to monitor and communicate with endocrinologist.  Reviewed Section 8.3.1 Nivolumab dose mods with treating MD. The Grade II Hypothyroidism was present at Baseline, has not increased in grade, and the patient is asymptomatic per the treating MD's PE. The patient denies any persistent headaches or visual changes.   Baseline Fatigue- Grade 1- pt reports continues and able to perform all ADLS and is considering joining the local [place].  Baseline Hypercalcemia-Grade 1 continues- corrected calcium calculation attached= 11.48mg/dL. Endocrinologist scheduled follow up appt. for patient [id number].  Baseline Hyperparathyroidism- Grade 2 (Start date 2021)- Continues (entered on baseline log today). [id number]- Endocrinologist ordered PTH remain elevated, trending downward, without change in grade. The treating MD is aware, does not deem CS, no new orders given. Endocrinologist following patient.     AEs:   *See shadow chart for full list of AEs.    The patient denied experiencing any infusion related reaction including chills/rigors, nausea, vomiting, or headache during or after the [id number] infusion.  Per protocol Section 7 / 7.1 & Table 3 The treating MD added hydrocortisone 50mg IV to Acetaminophen and Diphenhydramine premeds and increased infusion time of Cetuximab to four hours  followed by a period of observation for previous cycles, beginning with Cycle 1 Day 15. Pt. admits to tolerating C2 D1 without noted AE. Per the treating MD additional pre-meds and infusion extension will be added to all future infusion appts for the patient.      Rash-Maculo-papular Grade 1 Start date 8/28/2023- continues. No worsening of rash noted by the treating MD with today's derm examination. Pt. confirms continued use of clindamycin gel and doxycycline as prescribed for sporadic areas of pruritic rash. Mild soaps, temped water, and moisturizers recommended. Erbitux skin care kit provided to patient as packaged from [employer]. The treating MD is aware, and these areas were assessed by Dr Santo during the visit this afternoon, does not deem CS, and no new orders given.  Insomnia- Grade 2 (Start date 9/12/2023-continues) The treating MD deemed as unlikely d/t to protocol therapy, and does not deem as CS, no new orders given. Palliative care prescribed Mirtazapine, the patient admitted she started the medication and insomnia improved. The patient was reminded to avoid Trazodone while on Encorafenib.  Hypoalbuminemia- Grade 1 (Start date 10/4/2023) The treating MD deems possibly r/t to therapy. The treating MD does not deem CS and no new orders given. Pt encouraged to increase protein intake, and utilize supplemental drink high in protein. Message was sent to RD asking to f/u with pt.   Hypophosphatemia-Grade 1 (Start date 10/4/2023) ordered by endocrinologist. The treating MD deems unrelated to therapy, does not deem CS, and no new orders given. Endocrine monitoring.  Pain ( Right proximal forefoot area)- Grade 2 Start date 10/01/2023- no discoloration, redness, or swelling noted. Pt denied recent trauma to the area but does report a distant history of pain at site after extended walking in flip flops.The patient admits to taking a dose of Ibuprofen with improvement. The treating MD does not relate to therapy, does  "not deem as CS, no new orders given.        Eosinophilia- grade one entry lined through on AE log. Elevated percentage, but not number of eosinophils. Treating MD did not relate to therapy, nor CS, no need orders.  [id number]   Vitamin D, 25 Hydroxy ordered per endocrinologist; reported less than normal limits-endocrine following; No CTCAE grading found.The treating MD does not relate to therapy, does not deems as CS, no new orders given.     /64, Pulse 96, Temp 97.4, Resp 16   Ht 5' 6" (1.676 m) Wt. 128.9 kg (284 lb 2.4 oz) SpO2 99 % BSA 2.45 m²      [place] PS: One     Dr. Santo assessed all labs, VS & any PE findings as either WNL or NCS. At the discretion of the treating MD, the patient's current cardiac function does not require evaluation by ECG.   Pt was deemed eligible to receive C2 D 15 Erbitux in the AM. [id number] signed treatment plan orders reviewed with Dr Santo.       Treatment Dosing for C2 D15: Cetuximab Only  For the patient's safety plans to continue following the protocol's recommendations of adding Hydrocortisone to other premeds and extending the infusion time of the Cetuximab to prevent delayed infusion reaction.  *Infusion time extended per protocol Cetuximab-Related Infusion Reactions (Table 3).  Hydrocortisone 50mg IV added to premedication of Acetaminophen and Diphenhydramine. Protocol Section 7.0 / 7.1     Since pt's wt. has not changed by 10% from original dosing, no dosing changes required.  Baseline BSA 2.46 m2 based on Screening: Ht. 5'6 and 287.26 lbs. (130.3 kg)     Cetuximab 500 mg/m2 x 2.46 m2 BSA = 1230 mg over 4 hours- Rounded dose via pharmacy policy resulted in a dose of 1200mg.    This CRC reviewed upcoming appointments with the patient and encouraged to call with any new symptoms or issues, the patient verbalized understanding. Patient made aware to call after hour number, that she agrees to having, if after business hours or over the weekend.   The patient reports " that she is still planning on going out of town 10/10-10/18/23. See [id number] email communications with Dr. Arechiga allowing the CT Scan to take place on 09OCT2023 to accommodate the patient's plans. See source doc with this C2 Day 15 section of the patient's shadow chart.     CT C/A/P: 09OCT2023 at 14:45- patient is aware.    Cycle 3   [id number] 1st floor lab @ 13:30   [id number] OV with Dr. Santo @ 16:00    Cycle 3 Day 1: [id number] Infusion @ 0830     Next protocol required bio-specimen at C3D1 planned for 10/19/2023.    DAYDAY Adams RN

## 2023-10-05 NOTE — PROGRESS NOTES
PROGRESS NOTE    Subjective:       Patient ID: Gail Mckinnon is a 53 y.o. female.  MRN: [id number]  : 1970    Chief Complaint: Metastatic colon cancer     History of Present Illness:   Gail Mckinnon is a 53 y.o. female who presents with colon cancer, initially stage III and now with LN recurrence.    On FOLIRI+ Avastin sine .     She was briefly switched to xeloda due to 5 FU shortage for a month. Did not tolerate Xeloda well due hand foot syndrome, blistering of feet, did not take the last day of Xeloda. Completed .     Resumed Folfiri + Avastin on 23. Atropine was held due to prior constipation.     Was admitted to the hospital from -23 for intractable nausea , vomiting and diarrhea as well as abdominal pain. No hematochezia or dark stool was noted.      US showed gallbladder stone and dilated CBD. She was managed conservatively. Had CT abd, pelvis, colonoscopy and MRCP that were relatively unremarkable without signs of cholecystitis. Cholecystectomy was not recommended.     She had recently resumed FOLFIRI and the symptoms started after the first cycle of resuming, never had issues prior to this.  Stayed in the hospital for 10 days.  C diff was negative. No other etiology was established. Symptoms improved over time and she was discharged on .     Interim history:  Enrolled in  SWOG 2107 (encorafenib/cetuximab and randomized to the Nivolumab arm), here to obtain for cycle 2, day 8, due tomorrow.    Noticed right foot swelling and pain 3 days, was severe at times, responded to ibuprofen.     Reports stable fatigue, skin rash, on doxycycline. Improving but not resolved.   Intermittent nausea,on zofran prior to treatment.      No recurrence of abdominal pain.     Aware of Grave's disease history in , treated with LEE, now on synthroid.     Oncology History:  She completed adjuvant FOLFOX in 2020. She  tolerated only 4 cycles of FOLFOX before she developed an infusion reaction to oxaliplatin in cycle 5 was well as cycle 6. She then completed 6 cycles of infusional 5 FU.     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 She was offered second line therapy with FOLFIRI and Avastin.      She presented to the ED on 11/26 with left flank pain. CT renal stone study showed Moderate hydronephrosis on the left secondary to 3 mm calculus at the UPJ.    She had a PET scan mid April that showed possible progression of her disease with      She has been to [facility] for another opinion. No change was recommended in systemic therapy but she was offered surgical removal of the aorta caval nodes.  Recent sans at [facility]  show stable disease.      Surgery done 7/12/22. Received 2 more cycle of FOLFIRI without Avastin.     She had repeat imaging at [facility] on 9/24/22 that unfortunately showed disease progression since her surgery with multiple RP nodes and one mediastinal node.       PET 12/8/22  Impression:     1. Progression of disease with interval increase of abdominal and pelvic lymphadenopathy.  2. New area of moderate FDG uptake at the right half of the T9 vertebral body (image 124).  Favor degenerative disc changes.  No underlying osteolytic or osteoblastic lesion.  Recommend continued attention on follow-up.  3. No other evidence of FDG avid disease.     12/22/22  Impression After scan comparison:     1. Metastatic portacaval and left periaortic retroperitoneal lymph nodes which remain stable between the CT of 09/24/2022 and the subsequent PET-CT of 12/08/2022.  There is no evidence of progression of metastatic disease.  2. Nonobstructing bilateral renal calculi and cholelithiasis.    2/10/22:  CT chest abd pelvis  Impression:     Stable periportal, retroperitoneal and mesenteric lymphadenopathy compared to PET-CT 12/08/2022.     Stable right middle lobe 3 mm pulmonary nodule.  No new or enlarging pulmonary  nodule.     Hepatic steatosis.  Hepatosplenomegaly.     Cholelithiasis.     Bilateral nonobstructing nephrolithiasis.     Small hiatal hernia with retained contrast in the distal esophagus.  Correlate for reflux.    She had virtual visit at [facility] on 2/17/23.     She has continued FOLFIRI and Avastin.     CT abd pelvis   5/7/23  Impression:     1. Mesenteric inflammatory changes have worsened since the prior study.  2. Mesenteric, retroperitoneal and left-sided pelvic enlarged lymph nodes are unchanged.     8/11/23  CT chest abd pelvis   Impression:     1. Patient with a history of colon adenocarcinoma with worsening periaortic, retroperitoneal, mesenteric, and iliac chain lymphadenopathy the in the abdomen and pelvis when compared with the previous study suggesting worsening metastatic disease.  2. Moderate left hydronephrosis and proximal left hydroureter to the level of possible retroperitoneal scarring.  Invasion/compression of the left ureter is not excluded.  3. Bilateral nephrolithiasis  4. Hepatomegaly and hepatic steatosis  5. Two tiny < 5 mm pulmonary nodules within the chest, unchanged.  No new pulmonary nodules.      Oncology History:  Oncology History   Malignant neoplasm of sigmoid colon   3/16/2020 Initial Diagnosis    Malignant neoplasm of sigmoid colon     3/31/2020 Cancer Staged    Staging form: Colon and Rectum, AJCC 8th Edition  - Clinical stage from 3/31/2020: Stage IIIC (cT4b, cN2a, cM0)     5/6/2020 - 11/17/2020 Chemotherapy    Treatment Summary   Plan Name: OP FOLFOX 6 Q2W  Treatment Goal: Curative  Status: Inactive  Start Date: 5/6/2020  End Date: 11/6/2020  Provider: Dylan Leyva MD  Chemotherapy: fluorouraciL injection 945 mg, 400 mg/m2 = 945 mg, Intravenous, Clinic/[facility] 1 time, 14 of 14 cycles  Administration: 945 mg (5/6/2020), 945 mg (5/20/2020), 945 mg (6/3/2020), 945 mg (6/17/2020), 945 mg (7/29/2020), 945 mg (8/12/2020), 945 mg (8/26/2020), 945 mg (9/9/2020), 945 mg (9/23/2020), 945  mg (10/6/2020), 945 mg (10/21/2020), 945 mg (11/4/2020)  fluorouraciL 2,400 mg/m2 = 5,665 mg in sodium chloride 0.9% 240 mL chemo infusion, 2,400 mg/m2 = 5,665 mg, Intravenous, Over 46 hours, 14 of 14 cycles  Administration: 5,665 mg (5/6/2020), 5,665 mg (5/20/2020), 5,665 mg (6/3/2020), 5,665 mg (6/17/2020), 5,665 mg (7/29/2020), 5,665 mg (8/12/2020), 5,665 mg (8/26/2020), 5,665 mg (9/9/2020), 5,665 mg (9/23/2020), 5,665 mg (10/6/2020), 5,665 mg (10/21/2020), 5,665 mg (11/4/2020)  leucovorin calcium 900 mg in dextrose 5 % 250 mL infusion, 945 mg, Intravenous, Clinic/[facility] 1 time, 14 of 14 cycles  Administration: 900 mg (5/6/2020), 900 mg (5/20/2020), 900 mg (6/3/2020), 900 mg (6/17/2020), 945 mg (6/30/2020), 945 mg (7/20/2020), 945 mg (7/29/2020), 945 mg (8/12/2020), 945 mg (8/26/2020), 945 mg (9/9/2020), 945 mg (9/23/2020), 945 mg (10/6/2020), 945 mg (10/21/2020), 945 mg (11/4/2020)  oxaliplatin (ELOXATIN) 200 mg in dextrose 5 % 500 mL chemo infusion, 201 mg, Intravenous, Clinic/[facility] 1 time, 6 of 6 cycles  Dose modification: 65 mg/m2 (original dose 85 mg/m2, Cycle 6)  Administration: 200 mg (5/6/2020), 200 mg (5/20/2020), 200 mg (6/3/2020), 200 mg (6/17/2020), 201 mg (6/30/2020), 150 mg (7/20/2020)     5/3/2021 Cancer Staged    Staging form: Colon and Rectum, AJCC 8th Edition  - Clinical stage from 5/3/2021: Stage Unknown (rcT0, cNX, cM1)     6/16/2021 - 8/2/2023 Chemotherapy    Treatment Summary   Plan Name: OP COLORECTAL FOLFIRI + BEVACIZUMAB Q2W  Treatment Goal: Control  Status: Inactive  Start Date: 6/16/2021  End Date: 8/2/2023  Provider: Marah Santo MD  Chemotherapy: fluorouraciL injection 990 mg, 400 mg/m2 = 990 mg, Intravenous, Clinic/[facility] 1 time, 15 of 15 cycles  Administration: 990 mg (6/16/2021), 990 mg (6/30/2021), 990 mg (7/14/2021), 980 mg (7/28/2021), 990 mg (8/11/2021), 990 mg (8/25/2021), 990 mg (9/8/2021), 990 mg (9/22/2021), 990 mg (10/6/2021), 990 mg (10/20/2021), 990 mg (11/3/2021), 990 mg  (12/1/2021), 990 mg (12/15/2021), 990 mg (11/17/2021), 990 mg (12/28/2021)  fluorouraciL 2,400 mg/m2 = 5,950 mg in sodium chloride 0.9% 240 mL chemo infusion, 2,400 mg/m2 = 5,950 mg, Intravenous, Over 46 hours, 43 of 46 cycles  Administration: 5,950 mg (6/16/2021), 5,950 mg (6/30/2021), 5,950 mg (7/14/2021), 5,880 mg (7/28/2021), 5,930 mg (8/11/2021), 5,930 mg (8/25/2021), 5,930 mg (9/8/2021), 5,930 mg (9/22/2021), 5,930 mg (10/6/2021), 5,930 mg (10/20/2021), 5,930 mg (11/3/2021), 5,930 mg (12/1/2021), 5,930 mg (12/15/2021), 5,930 mg (11/17/2021), 5,930 mg (12/28/2021), 6,050 mg (5/11/2022), 6,025 mg (3/9/2022), 6,025 mg (2/23/2022), 5,930 mg (2/2/2022), 5,930 mg (1/19/2022), 6,050 mg (4/20/2022), 6,025 mg (4/6/2022), 6,025 mg (3/23/2022), 6,050 mg (6/1/2022), 6,050 mg (6/15/2022), 6,050 mg (10/19/2022), 6,050 mg (10/5/2022), 6,050 mg (11/2/2022), 6,050 mg (11/16/2022), 6,050 mg (11/30/2022), 6,050 mg (1/4/2023), 6,050 mg (12/19/2022), 6,050 mg (1/18/2023), 6,000 mg (5/22/2023), 6,000 mg (4/26/2023), 6,000 mg (4/11/2023), 6,000 mg (2/28/2023), 6,050 mg (2/14/2023), 6,000 mg (2/1/2023), 6,000 mg (7/5/2023), 6,000 mg (6/19/2023), 6,000 mg (6/5/2023), 6,000 mg (7/31/2023)  bevacizumab (AVASTIN) 600 mg in sodium chloride 0.9% 100 mL chemo infusion, 660 mg, Intravenous, Clinic/[facility] 1 time, 36 of 36 cycles  Dose modification: 5 mg/kg (original dose 5 mg/kg, Cycle 26, Reason: MD Discretion, Comment: 5 mg/kg original dose)  Administration: 600 mg (6/30/2021), 600 mg (7/14/2021), 600 mg (7/28/2021), 600 mg (6/16/2021), 600 mg (8/11/2021), 655 mg (8/25/2021), 600 mg (9/8/2021), 600 mg (9/22/2021), 600 mg (10/6/2021), 600 mg (10/20/2021), 600 mg (11/3/2021), 655 mg (12/1/2021), 600 mg (12/15/2021), 600 mg (11/17/2021), 600 mg (12/28/2021), 600 mg (5/11/2022), 600 mg (3/9/2022), 600 mg (2/23/2022), 600 mg (2/2/2022), 600 mg (1/19/2022), 600 mg (4/20/2022), 680 mg (4/6/2022), 600 mg (3/23/2022), 680 mg (10/5/2022), 680 mg  (10/19/2022), 680 mg (11/2/2022), 680 mg (11/16/2022), 680 mg (11/30/2022), 680 mg (1/4/2023), 680 mg (12/19/2022), 680 mg (1/18/2023), 680 mg (3/28/2023), 680 mg (3/14/2023), 680 mg (2/28/2023), 680 mg (2/14/2023), 680 mg (2/1/2023)  irinotecan (CAMPTOSAR) 440 mg in sodium chloride 0.9% 500 mL chemo infusion, 446 mg, Intravenous, Clinic/[facility] 1 time, 45 of 48 cycles  Dose modification: 180 mg/m2 (original dose 180 mg/m2, Cycle 24)  Administration: 440 mg (6/16/2021), 440 mg (6/30/2021), 440 mg (7/14/2021), 440 mg (7/28/2021), 440 mg (8/11/2021), 444 mg (8/25/2021), 440 mg (9/8/2021), 440 mg (9/22/2021), 440 mg (10/6/2021), 440 mg (10/20/2021), 440 mg (11/3/2021), 440 mg (12/1/2021), 440 mg (12/15/2021), 440 mg (11/17/2021), 440 mg (12/28/2021), 440 mg (5/11/2022), 440 mg (3/9/2022), 440 mg (2/23/2022), 440 mg (2/2/2022), 440 mg (1/19/2022), 440 mg (4/20/2022), 440 mg (4/6/2022), 440 mg (3/24/2022), 440 mg (6/1/2022), 440 mg (6/15/2022), 440 mg (10/19/2022), 440 mg (10/5/2022), 440 mg (11/2/2022), 440 mg (11/16/2022), 440 mg (11/30/2022), 440 mg (1/4/2023), 440 mg (12/19/2022), 440 mg (1/18/2023), 440 mg (5/22/2023), 440 mg (4/26/2023), 440 mg (4/11/2023), 440 mg (3/28/2023), 440 mg (3/14/2023), 440 mg (2/28/2023), 440 mg (2/14/2023), 440 mg (2/1/2023), 440 mg (7/5/2023), 440 mg (6/19/2023), 440 mg (6/5/2023), 440 mg (7/31/2023)  bevacizumab-awwb (MVASI) 5 mg/kg = 680 mg in sodium chloride 0.9% 100 mL infusion, 5 mg/kg = 680 mg (100 % of original dose 5 mg/kg), Intravenous, Clinic/[facility] 1 time, 7 of 10 cycles  Dose modification: 5 mg/kg (original dose 5 mg/kg, Cycle 39)  Administration: 680 mg (4/11/2023), 680 mg (4/26/2023), 680 mg (5/22/2023), 680 mg (7/5/2023), 680 mg (6/19/2023), 680 mg (6/5/2023), 680 mg (7/31/2023)     8/17/2023 - 8/18/2023 Chemotherapy    Treatment Summary   Plan Name: OP CETUXIMAB 500MG Q2W  Treatment Goal: Control  Status: Inactive  Start Date:   End Date:   Provider: Marah Santo  MD  Chemotherapy: [No matching medication found in this treatment plan]     8/24/2023 -  Chemotherapy    Treatment Summary   Plan Name: [facility] - ARM 1 ENCORAFENIB  CETUXIMAB NIVOLUMAB  Treatment Goal: Palliative  Status: Active  Start Date: 8/24/2023  End Date: 7/12/2024 (Planned)  Provider: Marah Santo MD  Chemotherapy: cetuximab (ERBITUX) 100 mg/50 mL chemo infusion 1,200 mg, 1,230 mg, Intravenous, Clinic/[facility] 1 time, 2 of 12 cycles  Administration: 1,200 mg (8/24/2023), 1,200 mg (9/8/2023), 1,200 mg (9/22/2023)  encorafenib 75 mg Cap, 300 mg, Oral, Daily, 1 of 1 cycle, Start date: --, End date: --     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   4/3/2020 Initial Diagnosis    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     5/6/2020 - 11/17/2020 Chemotherapy    Treatment Summary   Plan Name: OP FOLFOX 6 Q2W  Treatment Goal: Curative  Status: Inactive  Start Date: 5/6/2020  End Date: 11/6/2020  Provider: Dylan Leyva MD  Chemotherapy: fluorouraciL injection 945 mg, 400 mg/m2 = 945 mg, Intravenous, Clinic/[facility] 1 time, 14 of 14 cycles  Administration: 945 mg (5/6/2020), 945 mg (5/20/2020), 945 mg (6/3/2020), 945 mg (6/17/2020), 945 mg (7/29/2020), 945 mg (8/12/2020), 945 mg (8/26/2020), 945 mg (9/9/2020), 945 mg (9/23/2020), 945 mg (10/6/2020), 945 mg (10/21/2020), 945 mg (11/4/2020)  fluorouraciL 2,400 mg/m2 = 5,665 mg in sodium chloride 0.9% 240 mL chemo infusion, 2,400 mg/m2 = 5,665 mg, Intravenous, Over 46 hours, 14 of 14 cycles  Administration: 5,665 mg (5/6/2020), 5,665 mg (5/20/2020), 5,665 mg (6/3/2020), 5,665 mg (6/17/2020), 5,665 mg (7/29/2020), 5,665 mg (8/12/2020), 5,665 mg (8/26/2020), 5,665 mg (9/9/2020), 5,665 mg (9/23/2020), 5,665 mg (10/6/2020), 5,665 mg (10/21/2020), 5,665 mg (11/4/2020)  leucovorin calcium 900 mg in dextrose 5 % 250 mL infusion, 945 mg, Intravenous, Clinic/\[facility] [place] [facility]\"" 1 time, 14 of 14 cycles  Administration: 900 mg (5/6/2020), 900 mg (5/20/2020), 900 mg (6/3/2020), 900 mg (6/17/2020), 945 mg  (6/30/2020), 945 mg (7/20/2020), 945 mg (7/29/2020), 945 mg (8/12/2020), 945 mg (8/26/2020), 945 mg (9/9/2020), 945 mg (9/23/2020), 945 mg (10/6/2020), 945 mg (10/21/2020), 945 mg (11/4/2020)  oxaliplatin (ELOXATIN) 200 mg in dextrose 5 % 500 mL chemo infusion, 201 mg, Intravenous, Clinic/[facility] 1 time, 6 of 6 cycles  Dose modification: 65 mg/m2 (original dose 85 mg/m2, Cycle 6)  Administration: 200 mg (5/6/2020), 200 mg (5/20/2020), 200 mg (6/3/2020), 200 mg (6/17/2020), 201 mg (6/30/2020), 150 mg (7/20/2020)     6/16/2021 - 8/2/2023 Chemotherapy    Treatment Summary   Plan Name: OP COLORECTAL FOLFIRI + BEVACIZUMAB Q2W  Treatment Goal: Control  Status: Inactive  Start Date: 6/16/2021  End Date: 8/2/2023  Provider: Marah Santo MD  Chemotherapy: fluorouraciL injection 990 mg, 400 mg/m2 = 990 mg, Intravenous, Clinic/[facility] 1 time, 15 of 15 cycles  Administration: 990 mg (6/16/2021), 990 mg (6/30/2021), 990 mg (7/14/2021), 980 mg (7/28/2021), 990 mg (8/11/2021), 990 mg (8/25/2021), 990 mg (9/8/2021), 990 mg (9/22/2021), 990 mg (10/6/2021), 990 mg (10/20/2021), 990 mg (11/3/2021), 990 mg (12/1/2021), 990 mg (12/15/2021), 990 mg (11/17/2021), 990 mg (12/28/2021)  fluorouraciL 2,400 mg/m2 = 5,950 mg in sodium chloride 0.9% 240 mL chemo infusion, 2,400 mg/m2 = 5,950 mg, Intravenous, Over 46 hours, 43 of 46 cycles  Administration: 5,950 mg (6/16/2021), 5,950 mg (6/30/2021), 5,950 mg (7/14/2021), 5,880 mg (7/28/2021), 5,930 mg (8/11/2021), 5,930 mg (8/25/2021), 5,930 mg (9/8/2021), 5,930 mg (9/22/2021), 5,930 mg (10/6/2021), 5,930 mg (10/20/2021), 5,930 mg (11/3/2021), 5,930 mg (12/1/2021), 5,930 mg (12/15/2021), 5,930 mg (11/17/2021), 5,930 mg (12/28/2021), 6,050 mg (5/11/2022), 6,025 mg (3/9/2022), 6,025 mg (2/23/2022), 5,930 mg (2/2/2022), 5,930 mg (1/19/2022), 6,050 mg (4/20/2022), 6,025 mg (4/6/2022), 6,025 mg (3/23/2022), 6,050 mg (6/1/2022), 6,050 mg (6/15/2022), 6,050 mg (10/19/2022), 6,050 mg (10/5/2022), 6,050 mg  (11/2/2022), 6,050 mg (11/16/2022), 6,050 mg (11/30/2022), 6,050 mg (1/4/2023), 6,050 mg (12/19/2022), 6,050 mg (1/18/2023), 6,000 mg (5/22/2023), 6,000 mg (4/26/2023), 6,000 mg (4/11/2023), 6,000 mg (2/28/2023), 6,050 mg (2/14/2023), 6,000 mg (2/1/2023), 6,000 mg (7/5/2023), 6,000 mg (6/19/2023), 6,000 mg (6/5/2023), 6,000 mg (7/31/2023)  bevacizumab (AVASTIN) 600 mg in sodium chloride 0.9% 100 mL chemo infusion, 660 mg, Intravenous, [facility] [place] 1 time, 36 of 36 cycles  Dose modification: 5 mg/kg (original dose 5 mg/kg, Cycle 26, Reason: MD Discretion, Comment: 5 mg/kg original dose)  Administration: 600 mg (6/30/2021), 600 mg (7/14/2021), 600 mg (7/28/2021), 600 mg (6/16/2021), 600 mg (8/11/2021), 655 mg (8/25/2021), 600 mg (9/8/2021), 600 mg (9/22/2021), 600 mg (10/6/2021), 600 mg (10/20/2021), 600 mg (11/3/2021), 655 mg (12/1/2021), 600 mg (12/15/2021), 600 mg (11/17/2021), 600 mg (12/28/2021), 600 mg (5/11/2022), 600 mg (3/9/2022), 600 mg (2/23/2022), 600 mg (2/2/2022), 600 mg (1/19/2022), 600 mg (4/20/2022), 680 mg (4/6/2022), 600 mg (3/23/2022), 680 mg (10/5/2022), 680 mg (10/19/2022), 680 mg (11/2/2022), 680 mg (11/16/2022), 680 mg (11/30/2022), 680 mg (1/4/2023), 680 mg (12/19/2022), 680 mg (1/18/2023), 680 mg (3/28/2023), 680 mg (3/14/2023), 680 mg (2/28/2023), 680 mg (2/14/2023), 680 mg (2/1/2023)  irinotecan (CAMPTOSAR) 440 mg in sodium chloride 0.9% 500 mL chemo infusion, 446 mg, Intravenous, Clinic/[facility] [place] 1 time, 45 of 48 cycles  Dose modification: 180 mg/m2 (original dose 180 mg/m2, Cycle 24)  Administration: 440 mg (6/16/2021), 440 mg (6/30/2021), 440 mg (7/14/2021), 440 mg (7/28/2021), 440 mg (8/11/2021), 444 mg (8/25/2021), 440 mg (9/8/2021), 440 mg (9/22/2021), 440 mg (10/6/2021), 440 mg (10/20/2021), 440 mg (11/3/2021), 440 mg (12/1/2021), 440 mg (12/15/2021), 440 mg (11/17/2021), 440 mg (12/28/2021), 440 mg (5/11/2022), 440 mg (3/9/2022), 440 mg (2/23/2022), 440 mg (2/2/2022), 440 mg (1/19/2022),  440 mg (4/20/2022), 440 mg (4/6/2022), 440 mg (3/24/2022), 440 mg (6/1/2022), 440 mg (6/15/2022), 440 mg (10/19/2022), 440 mg (10/5/2022), 440 mg (11/2/2022), 440 mg (11/16/2022), 440 mg (11/30/2022), 440 mg (1/4/2023), 440 mg (12/19/2022), 440 mg (1/18/2023), 440 mg (5/22/2023), 440 mg (4/26/2023), 440 mg (4/11/2023), 440 mg (3/28/2023), 440 mg (3/14/2023), 440 mg (2/28/2023), 440 mg (2/14/2023), 440 mg (2/1/2023), 440 mg (7/5/2023), 440 mg (6/19/2023), 440 mg (6/5/2023), 440 mg (7/31/2023)  bevacizumab-awwb (MVASI) 5 mg/kg = 680 mg in sodium chloride 0.9% 100 mL infusion, 5 mg/kg = 680 mg (100 % of original dose 5 mg/kg), Intravenous, Clinic/[facility] 1 time, 7 of 10 cycles  Dose modification: 5 mg/kg (original dose 5 mg/kg, Cycle 39)  Administration: 680 mg (4/11/2023), 680 mg (4/26/2023), 680 mg (5/22/2023), 680 mg (7/5/2023), 680 mg (6/19/2023), 680 mg (6/5/2023), 680 mg (7/31/2023)     8/17/2023 - 8/18/2023 Chemotherapy    Treatment Summary   Plan Name: OP CETUXIMAB 500MG Q2W  Treatment Goal: Control  Status: Inactive  Start Date:   End Date:   Provider: Marah Santo MD  Chemotherapy: [No matching medication found in this treatment plan]     8/24/2023 -  Chemotherapy    Treatment Summary   Plan Name: [facility] - ARM 1 ENCORAFENIB  CETUXIMAB NIVOLUMAB  Treatment Goal: Palliative  Status: Active  Start Date: 8/24/2023  End Date: 7/12/2024 (Planned)  Provider: Marah Santo MD  Chemotherapy: cetuximab (ERBITUX) 100 mg/50 mL chemo infusion 1,200 mg, 1,230 mg, Intravenous, Clinic/[facility] 1 time, 2 of 12 cycles  Administration: 1,200 mg (8/24/2023), 1,200 mg (9/8/2023), 1,200 mg (9/22/2023)  encorafenib 75 mg Cap, 300 mg, Oral, Daily, 1 of 1 cycle, Start date: --, End date: --         History:  Past Medical History:   Diagnosis Date    Anxiety     Depression     FH: ovarian cancer 3/16/2020    Hx of psychiatric care     Effexor, Paxil, Lexapro, Zoloft, Wellbutrin, Trazodone Buspar    Hyperthyroidism     Hypothyroid      Kidney calculi     Malignant neoplasm of sigmoid colon 3/16/2020    Menorrhagia     Multinodular goiter 2012    Palpitation     Psychiatric problem     Venous insufficiency       Past Surgical History:   Procedure Laterality Date     SECTION, CLASSIC      x3    COLONOSCOPY N/A 2020    Procedure: COLONOSCOPY;  Surgeon: Shane Parker MD;  Location: [facility] ENDO;  Service: Endoscopy;  Laterality: N/A;    COLONOSCOPY N/A 2022    Procedure: COLONOSCOPY;  Surgeon: Shane Parker MD;  Location: [facility] ENDO;  Service: Endoscopy;  Laterality: N/A;    COLONOSCOPY N/A 2023    Procedure: COLONOSCOPY;  Surgeon: Shane Parker MD;  Location: [facility] ENDO;  Service: Endoscopy;  Laterality: N/A;  no cecum anastomosis    CYSTOSCOPY W/ URETERAL STENT PLACEMENT Bilateral 3/25/2020    Procedure: CYSTOSCOPY, WITH URETERAL STENT INSERTION;  Surgeon: Claudio Tyson MD;  Location: [facility] OR [address] [place] [address];  Service: Urology;  Laterality: Bilateral;    INSERTION OF TUNNELED CENTRAL VENOUS CATHETER (CVC) WITH SUBCUTANEOUS PORT N/A 2020    Procedure: KUQUXYEBC-CMLO-S-CATH;  Surgeon: [facility];  Location: [facility] OR [address] [place] [address];  Service: Radiology;  Laterality: N/A;  Room 189/Cindy    LAPAROSCOPIC SIGMOIDECTOMY N/A 3/25/2020    Procedure: COLECTOMY, SIGMOID, LAPAROSCOPIC, flex sig, ERAS high;  Surgeon: Silvio Man MD;  Location: [facility] OR [address] [place] [address];  Service: Colon and Rectal;  Laterality: N/A;    MOBILIZATION OF SPLENIC FLEXURE  3/25/2020    Procedure: MOBILIZATION, SPLENIC FLEXURE;  Surgeon: Silvio Man MD;  [facility];  Service: Colon and Rectal;;    tonsillectomy      TOTAL ABDOMINAL HYSTERECTOMY W/ BILATERAL SALPINGOOPHORECTOMY N/A 3/25/2020    Procedure: HYSTERECTOMY, TOTAL, ABDOMINAL, WITH BILATERAL SALPINGO-OOPHORECTOMY;  Surgeon: Keron Brady MD;  Location: [facility] OR [address] [place] [address];  Service: Oncology;  Laterality: N/A;     Family History   Problem Relation Age of  Onset    Heart disease Father     Diabetes Mother 65    Drug abuse Brother     Drug abuse Brother     Cancer Maternal Aunt         lung cancer    Cancer Maternal Grandmother         stomach cancer- started in ovaries    Ovarian cancer Maternal Grandmother         stomach cancer- started in ovaries    Colon cancer Paternal Uncle     Cancer Paternal Uncle     Ovarian cancer Maternal Aunt     Ovarian cancer Maternal Aunt     Ovarian cancer Cousin         mother had ovarian cancer    Drug abuse Son     Drug abuse Son         clean/sober since 2012    Colon cancer Son     No Known Problems Daughter     Uterine cancer Neg Hx     Breast cancer Neg Hx      Social History     Tobacco Use    Smoking status: Never    Smokeless tobacco: Never   Substance and Sexual Activity    Alcohol use: Yes     Comment: occasionally- twice monthly    Drug use: Never    Sexual activity: Yes     Partners: Male     Birth control/protection: See Surgical Hx        ROS:   Review of Systems   Constitutional:  Positive for malaise/fatigue. Negative for fever and weight loss.   HENT:  Negative for congestion, hearing loss, nosebleeds and sore throat.    Eyes:  Negative for double vision and photophobia.   Respiratory:  Negative for cough, hemoptysis, sputum production, shortness of breath and wheezing.    Cardiovascular:  Negative for chest pain, palpitations, orthopnea and leg swelling.   Gastrointestinal:  Positive for nausea (improving). Negative for abdominal pain, blood in stool, constipation, diarrhea, heartburn and vomiting.   Genitourinary:  Negative for dysuria, frequency, hematuria and urgency.   Musculoskeletal:  Negative for back pain, joint pain and myalgias.   Skin:  Positive for rash. Negative for itching.   Neurological:  Negative for dizziness, tingling, seizures, weakness and headaches.   Endo/Heme/Allergies:  Negative for polydipsia. Does not bruise/bleed easily.   Psychiatric/Behavioral:  Negative for depression and memory loss.  "The patient is nervous/anxious. The patient does not have insomnia.         Objective:     Vitals:    10/05/23 1409   BP: 108/64   Pulse: 96   Resp: 16   Temp: 97.4 °F (36.3 °C)   TempSrc: Temporal   SpO2: 99%   Weight: 128.9 kg (284 lb 2.8 oz)   Height: 5' 6" (1.676 m)   PainSc: 0-No pain           Wt Readings from Last 10 Encounters:   10/05/23 128.9 kg (284 lb 2.8 oz)   09/22/23 127.5 kg (281 lb 1.4 oz)   09/21/23 127.5 kg (281 lb 1.4 oz)   09/08/23 128.2 kg (282 lb 10.1 oz)   09/07/23 128.2 kg (282 lb 10.1 oz)   09/06/23 129.3 kg (285 lb 0.9 oz)   08/24/23 132.1 kg (291 lb 3.6 oz)   08/24/23 132.1 kg (291 lb 3.6 oz)   08/21/23 131.3 kg (289 lb 7.4 oz)   08/14/23 130.3 kg (287 lb 4.2 oz)       Physical Examination:   Physical Exam  Vitals and nursing note reviewed.   Constitutional:       General: She is not in acute distress.     Appearance: She is not diaphoretic.   HENT:      Head: Normocephalic.      Mouth/Throat:      Pharynx: No oropharyngeal exudate.   Eyes:      General: No scleral icterus.     Conjunctiva/sclera: Conjunctivae normal.   Neck:      Thyroid: No thyromegaly.   Cardiovascular:      Rate and Rhythm: Normal rate and regular rhythm.      Heart sounds: Normal heart sounds. No murmur heard.  Pulmonary:      Effort: Pulmonary effort is normal. No respiratory distress.      Breath sounds: No stridor. No wheezing or rales.   Chest:      Chest wall: No tenderness.   Abdominal:      General: Bowel sounds are normal. There is no distension.      Palpations: Abdomen is soft. There is no mass.      Tenderness: There is no abdominal tenderness. There is no rebound.   Musculoskeletal:         General: No tenderness or deformity. Normal range of motion.      Cervical back: Neck supple.   Lymphadenopathy:      Cervical: No cervical adenopathy.   Skin:     General: Skin is warm and dry.      Findings: Rash (grade 1 facial rash, at baseline) present. No erythema.   Neurological:      Mental Status: She is alert " and oriented to person, place, and time.      Cranial Nerves: No cranial nerve deficit.      Coordination: Coordination normal.      Gait: Gait is intact.   Psychiatric:         Mood and Affect: Affect normal.         Cognition and Memory: Memory normal.         Judgment: Judgment normal.          Diagnostic Tests:  Significant Imaging: I have reviewed and interpreted all pertinent imaging results/findings.  Laboratory Data:  All pertinent labs have been reviewed.    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 Oxaliplatin was stopped due to severe adverse reaction.     Follow-up CTs and PET in May 2021 showed enlarging retroperitoneal node, concerning for metastatic disease.      She started FOLFIRI + Avastin and has continued till July 2022.   Given isolated RP tnoi disease, she has undergone open Left periaortic and aortocaval lymph node dissection on 7/12/2022. Resumed FOLFIRI+ Debo with relatively stable disease but now has disease progression.     She has been enrolled into  SWOG 2107 (encorafenib/cetuximab + nivo) , cleared for cycle 2, D8. On additional pre medications given infusion reaction to Cetuximab, continue to monitor  closely.     Due for scans. Follow up per research protocol.     Grade 1 dermatitis   Noted, initially started topical hydrocortisone and clindamycin gel, now on oral doxycycline.     Neoplasm pain  Improving. Follow up with palliative care.     Hydronephrosis   Follow up with Urology for co management. No acute intervention was recommended.     Nausea   Continue compazine. Continue sacuso patch as needed.     Constipation  Start sennakot and daily Miralax.     Transaminitis  Fluctuates.  Continue to monitor. No obvious liver mets.     Hypercalcemia   Mild, secondary to hyperparathyroidism.  Avoid calcium supplements.     Hypothyroidism  Multinodular goiter   Continue Levothyroxine.  Had a non diagnostic biopsy in 2012, usg findings have remained stable. Reviewed repeat thyroid US, stable, will follow up with Endocrine.     Essential HTN   Continue antihypertensives.      Anxiety   Continue to  follow up with Onc psych.    MDM includes  :    - Acute or chronic illness or injury that poses a threat to life or bodily function  - Independent review and explanation of 3+ results from unique tests  - Discussion of management and ordering 3+ unique tests  - Extensive discussion of treatment and management  - Prescription drug management  - Drug therapy requiring intensive monitoring for toxicity          ECOG SCORE               Discussion:   No follow-ups on file.    Plan was discussed with the patient at length, and she verbalized understanding. Gail was given an opportunity to ask questions that were answered to her satisfaction, and she was advised to call in the interval if any problems or questions arise.    Electronically signed by Marah Santo MD        Route Chart for Scheduling    Med Onc Chart Routing      Follow up with physician . Move to 9 am appt on 10/19   Follow up with TAVO    Infusion scheduling note    Injection scheduling note    Labs    Imaging    Pharmacy appointment    Other referrals                   Treatment Plan Information   [facility] - ARM 1 ENCORAFENIB  CETUXIMAB NIVOLUMAB   Marah Santo MD   Upcoming Treatment Dates - [facility] - ARM 1 ENCORAFENIB  CETUXIMAB NIVOLUMAB    10/6/2023       Pre-Medications       acetaminophen tablet 650 mg       diphenhydrAMINE (BENADRYL) 50 mg in NS 50 mL IVPB       hydrocortisone sodium succinate injection 50 mg       ondansetron (ZOFRAN) 8 mg in sodium chloride 0.9% 50 mL IVPB       Chemotherapy       cetuximab (ERBITUX) 100 mg/50 mL chemo infusion 1,230 mg  10/20/2023       Pre-Medications       diphenhydrAMINE (BENADRYL) 50 mg in NS 50 mL IVPB       acetaminophen tablet 650 mg       hydrocortisone sodium succinate injection 50 mg       ondansetron injection 8 mg       Chemotherapy       cetuximab (ERBITUX) 100 mg/50 mL chemo infusion 1,230 mg       INV nivolumab 480 mg in INV sodium chloride 0.9 % 100 mL chemo infusion  11/3/2023       Pre-Medications       hydrocortisone sodium succinate injection 50 mg       diphenhydrAMINE (BENADRYL) 50 mg in NS 50 mL IVPB       acetaminophen tablet 650 mg       ondansetron injection 8 mg       Chemotherapy       cetuximab (ERBITUX) 100 mg/50 mL chemo infusion 1,230 mg  11/17/2023       Pre-Medications       ondansetron injection 8 mg       hydrocortisone sodium succinate injection 50 mg       diphenhydrAMINE (BENADRYL) 50 mg in NS 50 mL IVPB       acetaminophen tablet 650 mg       Chemotherapy       cetuximab (ERBITUX) 100 mg/50 mL chemo infusion 1,230 mg       INV nivolumab 480 mg in INV sodium chloride 0.9 % 100 mL chemo infusion    Supportive Plan Information  IV FLUIDS AND ELECTROLYTES   Brayden Solo MD   Upcoming Treatment Dates - IV FLUIDS AND ELECTROLYTES    No upcoming days in selected categories.    Therapy Plan Information  PORT FLUSH  Flushes  heparin, porcine (PF) 100 unit/mL injection flush 500 Units  500 Units, Intravenous, Every visit  sodium chloride 0.9% flush 10 mL  10 mL, Intravenous, Every  visit

## 2023-10-05 NOTE — TELEPHONE ENCOUNTER
Spoke with pt., Dr. Santo is double booked all day, appointment could not be moved, pt stated she understands.

## 2023-10-06 ENCOUNTER — DOCUMENTATION ONLY (OUTPATIENT)
Dept: INFUSION THERAPY | Facility: HOSPITAL | Age: 53
End: 2023-10-06
Payer: MEDICAID

## 2023-10-06 ENCOUNTER — INFUSION (OUTPATIENT)
Dept: INFUSION THERAPY | Facility: HOSPITAL | Age: 53
End: 2023-10-06
Attending: INTERNAL MEDICINE
Payer: MEDICAID

## 2023-10-06 VITALS
HEIGHT: 66 IN | SYSTOLIC BLOOD PRESSURE: 115 MMHG | TEMPERATURE: 98 F | WEIGHT: 284.19 LBS | DIASTOLIC BLOOD PRESSURE: 57 MMHG | HEART RATE: 96 BPM | RESPIRATION RATE: 18 BRPM | BODY MASS INDEX: 45.67 KG/M2

## 2023-10-06 DIAGNOSIS — C18.7 MALIGNANT NEOPLASM OF SIGMOID COLON: ICD-10-CM

## 2023-10-06 DIAGNOSIS — C77.2 METASTASIS TO INTESTINAL LYMPH NODE: Primary | ICD-10-CM

## 2023-10-06 PROCEDURE — 25000003 PHARM REV CODE 250: Mod: PN | Performed by: INTERNAL MEDICINE

## 2023-10-06 PROCEDURE — 96413 CHEMO IV INFUSION 1 HR: CPT | Mod: PN

## 2023-10-06 PROCEDURE — 96415 CHEMO IV INFUSION ADDL HR: CPT | Mod: PN

## 2023-10-06 PROCEDURE — 63600175 PHARM REV CODE 636 W HCPCS: Mod: PN | Performed by: INTERNAL MEDICINE

## 2023-10-06 PROCEDURE — 96367 TX/PROPH/DG ADDL SEQ IV INF: CPT | Mod: PN

## 2023-10-06 PROCEDURE — 96375 TX/PRO/DX INJ NEW DRUG ADDON: CPT | Mod: PN

## 2023-10-06 RX ORDER — SODIUM CHLORIDE 0.9 % (FLUSH) 0.9 %
10 SYRINGE (ML) INJECTION
Status: DISCONTINUED | OUTPATIENT
Start: 2023-10-06 | End: 2023-10-06 | Stop reason: HOSPADM

## 2023-10-06 RX ORDER — ACETAMINOPHEN 325 MG/1
650 TABLET ORAL
Status: COMPLETED | OUTPATIENT
Start: 2023-10-06 | End: 2023-10-06

## 2023-10-06 RX ORDER — DIPHENHYDRAMINE HYDROCHLORIDE 50 MG/ML
50 INJECTION INTRAMUSCULAR; INTRAVENOUS ONCE AS NEEDED
Status: DISCONTINUED | OUTPATIENT
Start: 2023-10-06 | End: 2023-10-06 | Stop reason: HOSPADM

## 2023-10-06 RX ORDER — EPINEPHRINE 0.3 MG/.3ML
0.3 INJECTION SUBCUTANEOUS ONCE AS NEEDED
Status: DISCONTINUED | OUTPATIENT
Start: 2023-10-06 | End: 2023-10-06 | Stop reason: HOSPADM

## 2023-10-06 RX ORDER — HEPARIN 100 UNIT/ML
500 SYRINGE INTRAVENOUS
Status: DISCONTINUED | OUTPATIENT
Start: 2023-10-06 | End: 2023-10-06 | Stop reason: HOSPADM

## 2023-10-06 RX ADMIN — DIPHENHYDRAMINE HYDROCHLORIDE 50 MG: 50 INJECTION, SOLUTION INTRAMUSCULAR; INTRAVENOUS at 09:10

## 2023-10-06 RX ADMIN — SODIUM CHLORIDE 8 MG: 9 INJECTION, SOLUTION INTRAVENOUS at 09:10

## 2023-10-06 RX ADMIN — CETUXIMAB 1200 MG: 2 SOLUTION INTRAVENOUS at 10:10

## 2023-10-06 RX ADMIN — Medication 500 UNITS: at 02:10

## 2023-10-06 RX ADMIN — SODIUM CHLORIDE: 9 INJECTION, SOLUTION INTRAVENOUS at 08:10

## 2023-10-06 RX ADMIN — ACETAMINOPHEN 650 MG: 325 TABLET ORAL at 08:10

## 2023-10-06 RX ADMIN — HYDROCORTISONE SODIUM SUCCINATE 50 MG: 100 INJECTION, POWDER, FOR SOLUTION INTRAMUSCULAR; INTRAVENOUS at 08:10

## 2023-10-06 NOTE — PLAN OF CARE
Pt arrived to clinic today for Erbitux infusion and tolerated well. No changes throughout therapy. Pt aware of follow up appointments and side effects of drugs. Discharged to home. NAD.

## 2023-10-06 NOTE — PROGRESS NOTES
Oncology Nutrition   Chemotherapy Infusion Visit    Nutrition Follow Up   RD met with patient at chairside during infusion treatment. Pt still struggling with low albumin and low magnesium per Mariela KATE. Pt states she has been drinking a few more protein shakes than she normally would the last couple of days. RD stressed drinking at least one per day if possible. Pt states she was provided a copy of foods that are higher in magnesium. Pt denies any further questions.     Pt eligible for TFP- order filled and will be provided to patient at the end of treatment today.     Wt Readings from Last 10 Encounters:   10/06/23 128.9 kg (284 lb 2.8 oz)   10/05/23 128.9 kg (284 lb 2.8 oz)   09/22/23 127.5 kg (281 lb 1.4 oz)   09/21/23 127.5 kg (281 lb 1.4 oz)   09/08/23 128.2 kg (282 lb 10.1 oz)   09/07/23 128.2 kg (282 lb 10.1 oz)   09/06/23 129.3 kg (285 lb 0.9 oz)   08/24/23 132.1 kg (291 lb 3.6 oz)   08/24/23 132.1 kg (291 lb 3.6 oz)   08/21/23 131.3 kg (289 lb 7.4 oz)       All other nutrition questions/concerns addressed as appropriate. Will continue to follow and monitor throughout treatment PRN.     Raquel Bullock, MS, RD, LDN  10/06/2023  1:34 PM

## 2023-10-08 DIAGNOSIS — C18.7 MALIGNANT NEOPLASM OF SIGMOID COLON: ICD-10-CM

## 2023-10-08 DIAGNOSIS — R11.0 NAUSEA: ICD-10-CM

## 2023-10-08 DIAGNOSIS — R21 RASH OF FACE: ICD-10-CM

## 2023-10-09 ENCOUNTER — HOSPITAL ENCOUNTER (OUTPATIENT)
Dept: RADIOLOGY | Facility: HOSPITAL | Age: 53
Discharge: HOME | End: 2023-10-09
Attending: INTERNAL MEDICINE
Payer: MEDICAID

## 2023-10-09 ENCOUNTER — PATIENT MESSAGE (OUTPATIENT)
Dept: HEMATOLOGY/ONCOLOGY | Facility: CLINIC | Age: 53
End: 2023-10-09
Payer: MEDICAID

## 2023-10-09 DIAGNOSIS — Z00.6 EXAMINATION OF PARTICIPANT IN CLINICAL TRIAL: ICD-10-CM

## 2023-10-09 DIAGNOSIS — C18.7 MALIGNANT NEOPLASM OF SIGMOID COLON: ICD-10-CM

## 2023-10-09 PROCEDURE — 71260 CT THORAX DX C+: CPT | Mod: 26,,, | Performed by: RADIOLOGY

## 2023-10-09 PROCEDURE — A9698 NON-RAD CONTRAST MATERIALNOC: HCPCS | Mod: PO | Performed by: INTERNAL MEDICINE

## 2023-10-09 PROCEDURE — 74177 CT ABD & PELVIS W/CONTRAST: CPT | Mod: TC,PO

## 2023-10-09 PROCEDURE — 71260 CT THORAX DX C+: CPT | Mod: TC,PO

## 2023-10-09 PROCEDURE — 74177 CT CHEST ABDOMEN PELVIS WITH CONTRAST (XPD): ICD-10-PCS | Mod: 26,,, | Performed by: RADIOLOGY

## 2023-10-09 PROCEDURE — 74177 CT ABD & PELVIS W/CONTRAST: CPT | Mod: 26,,, | Performed by: RADIOLOGY

## 2023-10-09 PROCEDURE — 71260 CT CHEST ABDOMEN PELVIS WITH CONTRAST (XPD): ICD-10-PCS | Mod: 26,,, | Performed by: RADIOLOGY

## 2023-10-09 PROCEDURE — 25500020 PHARM REV CODE 255: Mod: PO | Performed by: INTERNAL MEDICINE

## 2023-10-09 RX ORDER — LORAZEPAM 1 MG/1
1 TABLET ORAL EVERY 12 HOURS PRN
Qty: 30 TABLET | Refills: 0 | Status: SHIPPED | OUTPATIENT
Start: 2023-10-09 | End: 2023-11-14 | Stop reason: SDUPTHER

## 2023-10-09 RX ORDER — DOXYCYCLINE HYCLATE 100 MG
100 TABLET ORAL 2 TIMES DAILY
Qty: 60 TABLET | Refills: 3 | Status: SHIPPED | OUTPATIENT
Start: 2023-10-09 | End: 2024-01-27 | Stop reason: CLARIF

## 2023-10-09 RX ADMIN — IOHEXOL 1000 ML: 12 SOLUTION ORAL at 02:10

## 2023-10-09 RX ADMIN — IOHEXOL 100 ML: 350 INJECTION, SOLUTION INTRAVENOUS at 02:10

## 2023-10-09 NOTE — PROGRESS NOTES
Please let her know that she scans show response to treatment. We will monitor the lung nodule for now. Thanks

## 2023-10-11 DIAGNOSIS — Z00.6 EXAMINATION OF PARTICIPANT IN CLINICAL TRIAL: ICD-10-CM

## 2023-10-11 DIAGNOSIS — C18.7 MALIGNANT NEOPLASM OF SIGMOID COLON: ICD-10-CM

## 2023-10-13 ENCOUNTER — PATIENT MESSAGE (OUTPATIENT)
Dept: PALLIATIVE MEDICINE | Facility: CLINIC | Age: 53
End: 2023-10-13
Payer: MEDICAID

## 2023-10-18 ENCOUNTER — PATIENT MESSAGE (OUTPATIENT)
Dept: ADMINISTRATIVE | Facility: OTHER | Age: 53
End: 2023-10-18
Payer: MEDICAID

## 2023-10-18 ENCOUNTER — LAB VISIT (OUTPATIENT)
Dept: LAB | Facility: HOSPITAL | Age: 53
End: 2023-10-18
Attending: INTERNAL MEDICINE
Payer: MEDICAID

## 2023-10-18 ENCOUNTER — OFFICE VISIT (OUTPATIENT)
Dept: PALLIATIVE MEDICINE | Facility: CLINIC | Age: 53
End: 2023-10-18
Payer: MEDICAID

## 2023-10-18 VITALS
BODY MASS INDEX: 45.77 KG/M2 | HEART RATE: 104 BPM | OXYGEN SATURATION: 95 % | RESPIRATION RATE: 16 BRPM | HEIGHT: 66 IN | TEMPERATURE: 99 F | SYSTOLIC BLOOD PRESSURE: 106 MMHG | DIASTOLIC BLOOD PRESSURE: 60 MMHG | WEIGHT: 284.81 LBS

## 2023-10-18 DIAGNOSIS — Z51.5 PALLIATIVE CARE BY SPECIALIST: Primary | ICD-10-CM

## 2023-10-18 DIAGNOSIS — Z00.6 EXAMINATION OF PARTICIPANT IN CLINICAL TRIAL: ICD-10-CM

## 2023-10-18 DIAGNOSIS — G89.29 CHRONIC MUSCULOSKELETAL PAIN: ICD-10-CM

## 2023-10-18 DIAGNOSIS — F51.04 PSYCHOPHYSIOLOGICAL INSOMNIA: ICD-10-CM

## 2023-10-18 DIAGNOSIS — M79.18 CHRONIC MUSCULOSKELETAL PAIN: ICD-10-CM

## 2023-10-18 DIAGNOSIS — C18.7 MALIGNANT NEOPLASM OF SIGMOID COLON: ICD-10-CM

## 2023-10-18 DIAGNOSIS — F32.A DEPRESSIVE DISORDER: ICD-10-CM

## 2023-10-18 LAB
ALBUMIN SERPL BCP-MCNC: 3.8 G/DL (ref 3.5–5.2)
ALP SERPL-CCNC: 110 U/L (ref 55–135)
ALT SERPL W/O P-5'-P-CCNC: 30 U/L (ref 10–44)
ANION GAP SERPL CALC-SCNC: 10 MMOL/L (ref 8–16)
AST SERPL-CCNC: 29 U/L (ref 10–40)
BASOPHILS # BLD AUTO: 0.06 K/UL (ref 0–0.2)
BASOPHILS NFR BLD: 0.9 % (ref 0–1.9)
BILIRUB SERPL-MCNC: 0.4 MG/DL (ref 0.1–1)
BUN SERPL-MCNC: 8 MG/DL (ref 6–20)
CALCIUM SERPL-MCNC: 11 MG/DL (ref 8.7–10.5)
CHLORIDE SERPL-SCNC: 108 MMOL/L (ref 95–110)
CO2 SERPL-SCNC: 20 MMOL/L (ref 23–29)
CREAT SERPL-MCNC: 0.9 MG/DL (ref 0.5–1.4)
DIFFERENTIAL METHOD: ABNORMAL
EOSINOPHIL # BLD AUTO: 0.6 K/UL (ref 0–0.5)
EOSINOPHIL NFR BLD: 9 % (ref 0–8)
ERYTHROCYTE [DISTWIDTH] IN BLOOD BY AUTOMATED COUNT: 14.7 % (ref 11.5–14.5)
EST. GFR  (NO RACE VARIABLE): >60 ML/MIN/1.73 M^2
GLUCOSE SERPL-MCNC: 101 MG/DL (ref 70–110)
HCT VFR BLD AUTO: 47.7 % (ref 37–48.5)
HGB BLD-MCNC: 14.7 G/DL (ref 12–16)
IMM GRANULOCYTES # BLD AUTO: 0.04 K/UL (ref 0–0.04)
IMM GRANULOCYTES NFR BLD AUTO: 0.6 % (ref 0–0.5)
LYMPHOCYTES # BLD AUTO: 1.6 K/UL (ref 1–4.8)
LYMPHOCYTES NFR BLD: 24.1 % (ref 18–48)
MAGNESIUM SERPL-MCNC: 2.1 MG/DL (ref 1.6–2.6)
MCH RBC QN AUTO: 27.4 PG (ref 27–31)
MCHC RBC AUTO-ENTMCNC: 30.8 G/DL (ref 32–36)
MCV RBC AUTO: 89 FL (ref 82–98)
MONOCYTES # BLD AUTO: 0.7 K/UL (ref 0.3–1)
MONOCYTES NFR BLD: 10.7 % (ref 4–15)
NEUTROPHILS # BLD AUTO: 3.6 K/UL (ref 1.8–7.7)
NEUTROPHILS NFR BLD: 54.7 % (ref 38–73)
NRBC BLD-RTO: 0 /100 WBC
PLATELET # BLD AUTO: 314 K/UL (ref 150–450)
PMV BLD AUTO: 10 FL (ref 9.2–12.9)
POTASSIUM SERPL-SCNC: 4.4 MMOL/L (ref 3.5–5.1)
PROT SERPL-MCNC: 7.4 G/DL (ref 6–8.4)
RBC # BLD AUTO: 5.37 M/UL (ref 4–5.4)
SODIUM SERPL-SCNC: 138 MMOL/L (ref 136–145)
WBC # BLD AUTO: 6.55 K/UL (ref 3.9–12.7)

## 2023-10-18 PROCEDURE — 3074F PR MOST RECENT SYSTOLIC BLOOD PRESSURE < 130 MM HG: ICD-10-PCS | Mod: CPTII,,, | Performed by: STUDENT IN AN ORGANIZED HEALTH CARE EDUCATION/TRAINING PROGRAM

## 2023-10-18 PROCEDURE — 99999 PR PBB SHADOW E&M-EST. PATIENT-LVL IV: ICD-10-PCS | Mod: PBBFAC,,, | Performed by: STUDENT IN AN ORGANIZED HEALTH CARE EDUCATION/TRAINING PROGRAM

## 2023-10-18 PROCEDURE — 4010F PR ACE/ARB THEARPY RXD/TAKEN: ICD-10-PCS | Mod: CPTII,,, | Performed by: STUDENT IN AN ORGANIZED HEALTH CARE EDUCATION/TRAINING PROGRAM

## 2023-10-18 PROCEDURE — 83735 ASSAY OF MAGNESIUM: CPT | Mod: PN | Performed by: INTERNAL MEDICINE

## 2023-10-18 PROCEDURE — 36415 COLL VENOUS BLD VENIPUNCTURE: CPT | Mod: PN | Performed by: INTERNAL MEDICINE

## 2023-10-18 PROCEDURE — 3008F BODY MASS INDEX DOCD: CPT | Mod: CPTII,,, | Performed by: STUDENT IN AN ORGANIZED HEALTH CARE EDUCATION/TRAINING PROGRAM

## 2023-10-18 PROCEDURE — 3078F PR MOST RECENT DIASTOLIC BLOOD PRESSURE < 80 MM HG: ICD-10-PCS | Mod: CPTII,,, | Performed by: STUDENT IN AN ORGANIZED HEALTH CARE EDUCATION/TRAINING PROGRAM

## 2023-10-18 PROCEDURE — 3008F PR BODY MASS INDEX (BMI) DOCUMENTED: ICD-10-PCS | Mod: CPTII,,, | Performed by: STUDENT IN AN ORGANIZED HEALTH CARE EDUCATION/TRAINING PROGRAM

## 2023-10-18 PROCEDURE — 99999 PR PBB SHADOW E&M-EST. PATIENT-LVL IV: CPT | Mod: PBBFAC,,, | Performed by: STUDENT IN AN ORGANIZED HEALTH CARE EDUCATION/TRAINING PROGRAM

## 2023-10-18 PROCEDURE — 99214 OFFICE O/P EST MOD 30 MIN: CPT | Mod: S$PBB,,, | Performed by: STUDENT IN AN ORGANIZED HEALTH CARE EDUCATION/TRAINING PROGRAM

## 2023-10-18 PROCEDURE — 3074F SYST BP LT 130 MM HG: CPT | Mod: CPTII,,, | Performed by: STUDENT IN AN ORGANIZED HEALTH CARE EDUCATION/TRAINING PROGRAM

## 2023-10-18 PROCEDURE — 99214 PR OFFICE/OUTPT VISIT, EST, LEVL IV, 30-39 MIN: ICD-10-PCS | Mod: S$PBB,,, | Performed by: STUDENT IN AN ORGANIZED HEALTH CARE EDUCATION/TRAINING PROGRAM

## 2023-10-18 PROCEDURE — 3078F DIAST BP <80 MM HG: CPT | Mod: CPTII,,, | Performed by: STUDENT IN AN ORGANIZED HEALTH CARE EDUCATION/TRAINING PROGRAM

## 2023-10-18 PROCEDURE — 4010F ACE/ARB THERAPY RXD/TAKEN: CPT | Mod: CPTII,,, | Performed by: STUDENT IN AN ORGANIZED HEALTH CARE EDUCATION/TRAINING PROGRAM

## 2023-10-18 PROCEDURE — 85025 COMPLETE CBC W/AUTO DIFF WBC: CPT | Mod: PN | Performed by: INTERNAL MEDICINE

## 2023-10-18 PROCEDURE — 80053 COMPREHEN METABOLIC PANEL: CPT | Mod: PN | Performed by: INTERNAL MEDICINE

## 2023-10-18 PROCEDURE — 99214 OFFICE O/P EST MOD 30 MIN: CPT | Mod: PBBFAC,PN | Performed by: STUDENT IN AN ORGANIZED HEALTH CARE EDUCATION/TRAINING PROGRAM

## 2023-10-18 NOTE — PROGRESS NOTES
Office Visit  [facility]      Consult Requested By: No ref. provider found  Reason for Consult: pain and symptom management      ASSESSMENT/PLAN:     Plan/Recommendations:  Gail was seen today for palliative care by specialist.    Diagnoses and all orders for this visit:    Palliative care by specialist    Malignant neoplasm of sigmoid colon    Chronic musculoskeletal pain    Depressive disorder    Psychophysiological insomnia      # Malignant neoplasm of sigmoid colon  # Cancer related pain  # Chronic MSK pain  Resolved abdominal pain after starting SWOG 2017 clinical trial. Previously taking Percocet, tylenol and ibuprofen as needed. Endorses generalized joint pain in knees, hips. Reports moderate improvement in overall pain.  - Continue duloxetine 60mg daily scheduled    # Advance care planning  Discussed importance of advance care planning including serious illness conversations with next of kin including interventions such as CPR and mechanical ventilation. Also discussed the importance of documentation of these wishes in a formal living will. Additional documents including LAPOST and HCPOA were also discussed briefly.   - Patient discussed with family members and plans to complete ACP    # Major depressive disorder  See PHQ 9. Depressed mood. Previously prescribed Prozac however has not been consistent with use.   - Follow up psychology, psychiatry  - Continue duloxetine 60mg once daily scheduled    # Insomnia  Poor sleep--able to fall asleep but cannot stay asleep. Did not find benefit from trazodone.   - Increase to mirtazipine 15mg once daily  - Sleep hygiene     Follow up: 2 months 12/20/2023    SUBJECTIVE:     History of Present Illness:  Patient is a 53 y.o. 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 Referred by Dr. Santo.      Better mood due to improving  cancer. Most recent scan with improved cancer. Pain also improved on this clinical trial. Went to the beach for a weekend with friends and has a mild cold, improving on its own and had a few doses of mucinex.     Still looking for a job. 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     Taking duloxetine 60mg once daily. Had one month of migrating pain from ankle to knee to elbow. No pain today.     Has not found benefit from mirtazipine at 7.5mg. Once she gets to sleep she is able to sleep soundly. Very restless. Willing to try mirtazapine at 15mg.     Some variable low back pain. Previously seeing chiropractor which helped but was not able to continue when insurance changed. Plans to follow up with integrative oncology.     Family more understanding.     Spoke to her children about advance care planning and completed paperwork but forgot to bring it in. Plans to bring to next [facility] appointment.       Past Medical History:   Diagnosis Date    Anxiety     Depression     FH: ovarian cancer 3/16/2020    Hx of psychiatric care     Effexor, Paxil, Lexapro, Zoloft, Wellbutrin, Trazodone Buspar    Hyperthyroidism     Hypothyroid     Kidney calculi     Malignant neoplasm of sigmoid colon 3/16/2020    Menorrhagia     Multinodular goiter 2012    Palpitation     Psychiatric problem     Venous insufficiency      Past Surgical History:   Procedure Laterality Date     SECTION, CLASSIC      x3    COLONOSCOPY N/A 2020    Procedure: COLONOSCOPY;  Surgeon: Shane Parker MD;  Location: [facility] ENDO;  Service: Endoscopy;  Laterality: N/A;    COLONOSCOPY N/A 2022    Procedure: COLONOSCOPY;  Surgeon: Shane Parker MD;  Location: [facility] ENDO;  Service: Endoscopy;  Laterality: N/A;    COLONOSCOPY N/A 2023    Procedure: COLONOSCOPY;  Surgeon: Shane Parker MD;  [email or web address] ENDO;  Service: Endoscopy;  Laterality: N/A;  no cecum anastomosis    CYSTOSCOPY W/ URETERAL STENT  PLACEMENT Bilateral 3/25/2020    Procedure: CYSTOSCOPY, WITH URETERAL STENT INSERTION;  Surgeon: Claudio Tyson MD;  Location: [facility] OR 2ND FLR;  Service: Urology;  Laterality: Bilateral;    INSERTION OF TUNNELED CENTRAL VENOUS CATHETER (CVC) WITH SUBCUTANEOUS PORT N/A 5/1/2020    Procedure: [id number]-[id number];  Surgeon: Stephen Diagnostic Provider;  Location: [facility] OR 2ND FLR;  Service: Radiology;  Laterality: N/A;  Room 189/Cindy    LAPAROSCOPIC SIGMOIDECTOMY N/A 3/25/2020    Procedure: COLECTOMY, SIGMOID, LAPAROSCOPIC, flex sig, ERAS high;  Surgeon: Silvio Man MD;  Location: [facility] OR 2ND FLR;  Service: Colon and Rectal;  Laterality: N/A;    MOBILIZATION OF SPLENIC FLEXURE  3/25/2020    Procedure: MOBILIZATION, SPLENIC FLEXURE;  Surgeon: Silvio Man MD;  Location: [facility] OR 2ND FLR;  Service: Colon and Rectal;;    tonsillectomy      TOTAL ABDOMINAL HYSTERECTOMY W/ BILATERAL SALPINGOOPHORECTOMY N/A 3/25/2020    Procedure: HYSTERECTOMY, TOTAL, ABDOMINAL, WITH BILATERAL SALPINGO-OOPHORECTOMY;  Surgeon: Keron Brday MD;  Location: [facility] OR 2ND FLR;  Service: Oncology;  Laterality: N/A;     Family History   Problem Relation Age of Onset    Heart disease Father     Diabetes Mother 65    Drug abuse Brother     Drug abuse Brother     Cancer Maternal Aunt         lung cancer    Cancer Maternal Grandmother         stomach cancer- started in ovaries    Ovarian cancer Maternal Grandmother         stomach cancer- started in ovaries    Colon cancer Paternal Uncle     Cancer Paternal Uncle     Ovarian cancer Maternal Aunt     Ovarian cancer Maternal Aunt     Ovarian cancer Cousin         mother had ovarian cancer    Drug abuse Son     Drug abuse Son         clean/sober since 2012    Colon cancer Son     No Known Problems Daughter     Uterine cancer Neg Hx     Breast cancer Neg Hx      Review of patient's allergies indicates:   Allergen Reactions    Oxaliplatin Other (See Comments)     Itchy hands, throat  closed up, trouble hearing - during infusion    Penicillins Hives and Rash     childhood allergy  childhood allergy  unknown     Social Determinants of Health     Tobacco Use: Low Risk  (10/19/2023)    Patient History     Smoking Tobacco Use: Never     Smokeless Tobacco Use: Never     Passive Exposure: Not on file   Alcohol Use: Not on file   Financial Resource Strain: Not on file   Food Insecurity: Not on file   Transportation Needs: Not on file   Physical Activity: Not on file   Stress: Not on file   Social Connections: Not on file   Housing Stability: Not on file   Depression: Medium Risk (10/19/2023)    Depression     Last PHQ-4: Flowsheet Data: 7      The Modified Caregiver Strain Index (MCSI) -   No data recorded   No data recorded   No data recorded   No data recorded   No data recorded   No data recorded   No data recorded   No data recorded   No data recorded   No data recorded   No data recorded   No data recorded   No data recorded   No data recorded       Medications:    Current Outpatient Medications:     acetaminophen (TYLENOL) 500 MG tablet, Take 2 tablets (1,000 mg total) by mouth every 8 (eight) hours., Disp: 60 tablet, Rfl: 0    buPROPion (WELLBUTRIN SR) 150 MG TBSR 12 hr tablet, Take 1 tablet (150 mg total) by mouth 2 (two) times daily. (Patient taking differently: Take 150 mg by mouth once daily.), Disp: 180 tablet, Rfl: 0    clindamycin phosphate 1% (CLINDAGEL) 1 % gel, Apply topically 2 (two) times daily., Disp: 60 g, Rfl: 3    doxycycline (VIBRA-TABS) 100 MG tablet, Take 1 tablet (100 mg total) by mouth 2 (two) times daily., Disp: 60 tablet, Rfl: 3    encorafenib 75 mg Cap, Take 4 tablets (300 mg total) by mouth once daily., Disp: 120 capsule, Rfl: 0    granisetron (SANCUSO) 3.1 mg/24 hour, Place 1 patch (3.1 mg total) onto the skin every 7 days AS DIRECTED., Disp: 4 patch, Rfl: 3    Lactobacillus rhamnosus GG (CULTURELLE) 10 billion cell capsule, Take 1 capsule by mouth once daily., Disp: ,  Rfl:     levothyroxine (SYNTHROID) 125 MCG tablet, Take 2 tablets (250 mcg total) by mouth daily before breakfast., Disp: 60 tablet, Rfl: 11    LIDOcaine-prilocaine (EMLA) cream, Apply topically as needed (30 to 60 min prior chemo or port use)., Disp: 30 g, Rfl: 3    LORazepam (ATIVAN) 1 MG tablet, Take 1 tablet (1 mg total) by mouth every 12 (twelve) hours as needed for Anxiety (nausea)., Disp: 30 tablet, Rfl: 0    magic mouthwash diphen/antac/lidoc/nysta, Swish and swallow 5 mL every 4 hours as needed for mouth pain/ulcers, Disp: 120 mL, Rfl: 2    mirtazapine (REMERON) 7.5 MG Tab, Take 1 tablet (7.5 mg total) by mouth every evening., Disp: 30 tablet, Rfl: 0    multivitamin (THERAGRAN) per tablet, Take 1 tablet by mouth once daily., Disp: , Rfl:     olmesartan (BENICAR) 20 MG tablet, Take 1 tablet (20 mg total) by mouth once daily., Disp: 30 tablet, Rfl: 5    ondansetron (ZOFRAN-ODT) 8 MG TbDL, Take 1 tablet (8 mg total) by mouth every 8 (eight) hours as needed., Disp: 60 tablet, Rfl: 3    oxyCODONE-acetaminophen (PERCOCET)  mg per tablet, Take 1 tablet by mouth every 6 (six) hours as needed for Pain., Disp: 60 tablet, Rfl: 0    prochlorperazine (COMPAZINE) 10 MG tablet, Take 1 tablet (10 mg total) by mouth every 6 (six) hours as needed., Disp: 30 tablet, Rfl: 6    senna-docusate 8.6-50 mg (PERICOLACE) 8.6-50 mg per tablet, Take 2 tablets by mouth 2 (two) times daily., Disp: , Rfl:     DULoxetine (CYMBALTA) 30 MG capsule, Take 2 capsules (60 mg total) by mouth once daily., Disp: 60 capsule, Rfl: 0    OBJECTIVE:       ROS:  Review of Systems   Constitutional:  Negative for appetite change and unexpected weight change.   HENT:  Negative for mouth sores.    Eyes:  Negative for visual disturbance.   Respiratory:  Negative for cough and shortness of breath.    Cardiovascular:  Negative for chest pain.   Gastrointestinal:  Negative for abdominal pain and diarrhea.   Genitourinary:  Negative for frequency.    Musculoskeletal:  Positive for back pain.   Skin:  Positive for rash.   Neurological:  Negative for headaches.   Hematological:  Negative for adenopathy.   Psychiatric/Behavioral:  The patient is nervous/anxious.        Review of Symptoms      Symptom Assessment (ESAS 0-10 Scale)  Pain:  0  Dyspnea:  0  Anxiety:  0  Nausea:  0  Depression:  0  Anorexia:  0  Fatigue:  0  Insomnia:  0  Restlessness:  0  Agitation:  0       Anxiety:  Is nervous/anxious    Performance Status:  80    ECOG Performance Status stGstrstastdstest:st st1st Living Arrangements:  Lives with family and Lives in home    Psychosocial/Cultural:   See Palliative Psychosocial Note: Yes  **Primary  to Follow**  Palliative Care  Consult: No     Time-Based Charting:  Yes  Chart Review: 20 minutes  Symptom Assessment: 35 minutes    Total Time Spent: 55 minutes      Advance Care Planning   Advance Directives:     Decision Making:  Patient answered questions  Goals of Care: What is most important right now is to focus on symptom/pain control. Accordingly, we have decided that the best plan to meet the patient's goals includes continuing with treatment.              Physical Exam:  Vitals: Temp: 98.5 °F (36.9 °C) (10/18/23 1315)  Pulse: 104 (10/18/23 1315)  Resp: 16 (10/18/23 1315)  BP: 106/60 (10/18/23 1315)  SpO2: 95 % (10/18/23 1315)  Physical Exam  Vitals and nursing note reviewed.   Constitutional:       General: She is not in acute distress.     Appearance: Normal appearance. She is obese. She is not ill-appearing or toxic-appearing.   HENT:      Mouth/Throat:      Mouth: Mucous membranes are moist.      Pharynx: Oropharynx is clear.   Eyes:      General: No scleral icterus.     Conjunctiva/sclera: Conjunctivae normal.   Pulmonary:      Effort: Pulmonary effort is normal. No respiratory distress.   Abdominal:      General: Abdomen is flat. There is no distension.      Palpations: Abdomen is soft.      Tenderness: There is no abdominal  tenderness.   Musculoskeletal:         General: Normal range of motion.      Cervical back: Normal range of motion.      Right lower leg: No edema.      Left lower leg: No edema.   Skin:     General: Skin is warm and dry.      Coloration: Skin is not jaundiced or pale.   Neurological:      Mental Status: She is alert and oriented to person, place, and time.   Psychiatric:         Mood and Affect: Mood normal.         Behavior: Behavior normal.         Thought Content: Thought content normal.         Judgment: Judgment normal.         Labs:  CBC:   WBC   Date Value Ref Range Status   10/18/2023 6.55 3.90 - 12.70 K/uL Final     Hemoglobin   Date Value Ref Range Status   10/18/2023 14.7 12.0 - 16.0 g/dL Final     Hematocrit   Date Value Ref Range Status   10/18/2023 47.7 37.0 - 48.5 % Final     MCV   Date Value Ref Range Status   10/18/2023 89 82 - 98 fL Final     Platelets   Date Value Ref Range Status   10/18/2023 314 150 - 450 K/uL Final       LFT:   Lab Results   Component Value Date    AST 29 10/18/2023    ALKPHOS 110 10/18/2023    BILITOT 0.4 10/18/2023       Albumin:   Albumin   Date Value Ref Range Status   10/18/2023 3.8 3.5 - 5.2 g/dL Final     Protein:   Total Protein   Date Value Ref Range Status   10/18/2023 7.4 6.0 - 8.4 g/dL Final       Radiology: None    35 minutes of total time spent on the encounter, which includes face to face time and non-face to face time preparing to see the patient (eg, review of tests), Obtaining and/or reviewing separately obtained history, Documenting clinical information in the electronic or other health record, Independently interpreting results (not separately reported) and communicating results to the patient/family/caregiver, or Care coordination (not separately reported).      Signature: Lara Hill DO

## 2023-10-19 ENCOUNTER — OFFICE VISIT (OUTPATIENT)
Dept: HEMATOLOGY/ONCOLOGY | Facility: CLINIC | Age: 53
End: 2023-10-19
Payer: MEDICAID

## 2023-10-19 ENCOUNTER — RESEARCH ENCOUNTER (OUTPATIENT)
Dept: RESEARCH | Facility: HOSPITAL | Age: 53
End: 2023-10-19
Payer: MEDICAID

## 2023-10-19 VITALS
SYSTOLIC BLOOD PRESSURE: 122 MMHG | HEIGHT: 66 IN | BODY MASS INDEX: 45.35 KG/M2 | TEMPERATURE: 97 F | OXYGEN SATURATION: 96 % | HEART RATE: 104 BPM | WEIGHT: 282.19 LBS | DIASTOLIC BLOOD PRESSURE: 78 MMHG | RESPIRATION RATE: 16 BRPM

## 2023-10-19 DIAGNOSIS — C18.7 MALIGNANT NEOPLASM OF SIGMOID COLON: Primary | ICD-10-CM

## 2023-10-19 DIAGNOSIS — C77.2 METASTASIS TO INTESTINAL LYMPH NODE: ICD-10-CM

## 2023-10-19 DIAGNOSIS — C78.6 SECONDARY MALIGNANT NEOPLASM OF RETROPERITONEUM: ICD-10-CM

## 2023-10-19 DIAGNOSIS — C77.9 SECONDARY ADENOCARCINOMA OF LYMPH NODE: ICD-10-CM

## 2023-10-19 PROCEDURE — 3078F DIAST BP <80 MM HG: CPT | Mod: CPTII,,, | Performed by: INTERNAL MEDICINE

## 2023-10-19 PROCEDURE — 99214 OFFICE O/P EST MOD 30 MIN: CPT | Mod: PBBFAC,PN | Performed by: INTERNAL MEDICINE

## 2023-10-19 PROCEDURE — 4010F ACE/ARB THERAPY RXD/TAKEN: CPT | Mod: CPTII,,, | Performed by: INTERNAL MEDICINE

## 2023-10-19 PROCEDURE — 3008F PR BODY MASS INDEX (BMI) DOCUMENTED: ICD-10-PCS | Mod: CPTII,,, | Performed by: INTERNAL MEDICINE

## 2023-10-19 PROCEDURE — 99999 PR PBB SHADOW E&M-EST. PATIENT-LVL IV: ICD-10-PCS | Mod: PBBFAC,,, | Performed by: INTERNAL MEDICINE

## 2023-10-19 PROCEDURE — 3074F SYST BP LT 130 MM HG: CPT | Mod: CPTII,,, | Performed by: INTERNAL MEDICINE

## 2023-10-19 PROCEDURE — 99215 PR OFFICE/OUTPT VISIT, EST, LEVL V, 40-54 MIN: ICD-10-PCS | Mod: S$PBB,,, | Performed by: INTERNAL MEDICINE

## 2023-10-19 PROCEDURE — 99215 OFFICE O/P EST HI 40 MIN: CPT | Mod: S$PBB,,, | Performed by: INTERNAL MEDICINE

## 2023-10-19 PROCEDURE — 4010F PR ACE/ARB THEARPY RXD/TAKEN: ICD-10-PCS | Mod: CPTII,,, | Performed by: INTERNAL MEDICINE

## 2023-10-19 PROCEDURE — 3008F BODY MASS INDEX DOCD: CPT | Mod: CPTII,,, | Performed by: INTERNAL MEDICINE

## 2023-10-19 PROCEDURE — 3074F PR MOST RECENT SYSTOLIC BLOOD PRESSURE < 130 MM HG: ICD-10-PCS | Mod: CPTII,,, | Performed by: INTERNAL MEDICINE

## 2023-10-19 PROCEDURE — 99999 PR PBB SHADOW E&M-EST. PATIENT-LVL IV: CPT | Mod: PBBFAC,,, | Performed by: INTERNAL MEDICINE

## 2023-10-19 PROCEDURE — 3078F PR MOST RECENT DIASTOLIC BLOOD PRESSURE < 80 MM HG: ICD-10-PCS | Mod: CPTII,,, | Performed by: INTERNAL MEDICINE

## 2023-10-19 RX ORDER — HEPARIN 100 UNIT/ML
500 SYRINGE INTRAVENOUS
Status: CANCELLED | OUTPATIENT
Start: 2023-10-20

## 2023-10-19 RX ORDER — SODIUM CHLORIDE 0.9 % (FLUSH) 0.9 %
10 SYRINGE (ML) INJECTION
Status: CANCELLED | OUTPATIENT
Start: 2023-10-20

## 2023-10-19 RX ORDER — ACETAMINOPHEN 325 MG/1
650 TABLET ORAL
Status: CANCELLED | OUTPATIENT
Start: 2023-10-20

## 2023-10-19 RX ORDER — DIPHENHYDRAMINE HYDROCHLORIDE 50 MG/ML
50 INJECTION INTRAMUSCULAR; INTRAVENOUS ONCE AS NEEDED
Status: CANCELLED | OUTPATIENT
Start: 2023-11-03

## 2023-10-19 RX ORDER — DIPHENHYDRAMINE HYDROCHLORIDE 50 MG/ML
50 INJECTION INTRAMUSCULAR; INTRAVENOUS ONCE AS NEEDED
Status: CANCELLED | OUTPATIENT
Start: 2023-10-20

## 2023-10-19 RX ORDER — ONDANSETRON 2 MG/ML
8 INJECTION INTRAMUSCULAR; INTRAVENOUS
Status: CANCELLED
Start: 2023-11-03

## 2023-10-19 RX ORDER — EPINEPHRINE 0.3 MG/.3ML
0.3 INJECTION SUBCUTANEOUS ONCE AS NEEDED
Status: CANCELLED | OUTPATIENT
Start: 2023-11-03

## 2023-10-19 RX ORDER — SODIUM CHLORIDE 0.9 % (FLUSH) 0.9 %
10 SYRINGE (ML) INJECTION
Status: CANCELLED | OUTPATIENT
Start: 2023-11-03

## 2023-10-19 RX ORDER — ONDANSETRON 2 MG/ML
8 INJECTION INTRAMUSCULAR; INTRAVENOUS
Status: CANCELLED
Start: 2023-10-20

## 2023-10-19 RX ORDER — HEPARIN 100 UNIT/ML
500 SYRINGE INTRAVENOUS
Status: CANCELLED | OUTPATIENT
Start: 2023-11-03

## 2023-10-19 RX ORDER — EPINEPHRINE 0.3 MG/.3ML
0.3 INJECTION SUBCUTANEOUS ONCE AS NEEDED
Status: CANCELLED | OUTPATIENT
Start: 2023-10-20

## 2023-10-19 RX ORDER — ACETAMINOPHEN 325 MG/1
650 TABLET ORAL
Status: CANCELLED | OUTPATIENT
Start: 2023-11-03

## 2023-10-19 NOTE — PROGRESS NOTES
Gail Mckinnon, MRN [id number]  PID# [id number]     Protocol: SWOG   IRB#: 2023.047  : NGUYEN Degroot MD  Treating Investigator: JESUS Santo MD     Randomized Phase II Trial of Encorafenib and Cetuximab with or Without Nivolumab ([facility] #[id number]) for Patients with Previously Treated, Microsatellite Stable BRAF V600E Metastatic and/or Unresectable Colorectal Cancer   Cycle 1 Day 15 Arm 1/ Nivolumab + Cetuximab + Encorafenib therapy.     19OCT2023  -->  Cycle 2 Day 28     C3 Day 1 - Planned for 20OCT2023.      The patient presented to the planned office visit alone. The patient was alert, oriented, without noted distress, with appropriate mood and affect for the situation, and freely agreed to continue with participating in the referenced study.     Pt completed the required pre-tx labs on 18OCT2023:  CBC w/ diff, CMP, Magnesium resulted and reviewed.   Protocol does not require TSH at this time point. Endocrinology following TSH for patient's baseline Grade II Hypothyroidism (resulted from treatment for Hyperthyroidism). See communication from study chair regarding TSH monitoring for this patient in Cycle 1 section of shadow chart.     [id number] PTH ordered per endocrinologist-monitoring Baseline Hyperparathyroidism. PTH remains elevated without change in grade. Endocrinologist following patient with planned office visit for 30OCT2023. The treating MD is aware, does not relate to therapy, does not deem CS, no new orders given.     [id number] Corrected calcium calculated as 11.2 mg/dL. The treating MD is aware, does not deem CS or r/t to protocol therapy. No new orders given.    [id number] Vitamin D level- Low @ 18ng/mL (30-96 ng/dL) institution reference defines 10-29ng/mL as Vitamin D insufficiency. Per endocrinologist's office on 10/18/2023, there has been no prescribed treatment or intervention since the 04OCT2023 lab was resulted. The office plans to address at the upcoming appt. on 30OCT2023. The treating  MD is aware, does not deem as CS or related to protocol therapy. No new orders given.     ConMed list reviewed with the patient for accuracy- see shadow chart for changes.     Patient admits to being compliant with QD dosing of encorafenib and the use of the protocol's pill diary.   The patient returned remaining encorafenib and protocol pill diary, with correct count.   Last dose of Encorafenib from Cycle 3 refill/supply expected to take place on Thursday 16NOV2023 (C3D28) Reviewed with patient.    Pt. made aware to call this CRC if she does not receive communication for next Encorafenib refill from [facility] by second week in November.   The patient verbalized understanding to take Encorafenib #4 capsule with water in the morning at the same time each day. Pt. also agreed to taking before leaving home on the days of infusional therapy to provide one hour time frame before receiving oral pre-medications.     Baseline AEs:  See shadow chart for full list of Baseline AEs.     Baseline Hypothyroidism- Grade 2 (Start date 2012-continues) Resulted from treatment for Hyperthyroidism.  The treating MD is aware on changes in Synthroid dosing and does not deem CS or related to protocol therapy. No new orders given-will continue to monitor and communicate with endocrinologist.  Dr. Santo is aware of Synthroid dosing/ treatment for Grade 2 BASELINE Hypothyroidism does not deem related to protocol treatment, nor CS, no new orders given.   See printed source docs under C1 D1 CRC note.  Reviewed Section 8.3.1 Nivolumab dose mods with treating MD. The Grade II Hypothyroidism present at Baseline, has not increased in grade, and the patient is asymptomatic per the treating MD's PE. The patient denies any persistent headaches or visual changes.   Baseline Fatigue- Grade 1- pt reports continues and able to perform all ADLS.  Baseline Hypercalcemia-Grade 1 continues- corrected calcium calculation attached= 11.2  mg/dL. Endocrinologist scheduled follow up appt. for patient 30OCT2023. Per Dr. Moss's office, no treatment or intervention at this time. Evaluation planned at 30OCY2023 visit.  Baseline Hyperparathyroidism- Grade 2 (Start date 2021)- Continues. 04OCT2023- Endocrinologist ordered PTH remain elevated, trending downward, without change in grade. The treating MD is aware, does not deem CS, no new orders given. Endocrinologist following patient.     AEs:   See shadow chart for full list of AEs.    The patient specifically denies any nausea, diarrhea.     The patient denies experiencing any infusion related reaction including chills/rigors, nausea, vomiting, or headache during or after the 06OCT2023 infusion.  Per protocol Section 7 / 7.1 & Table 3 The treating MD added hydrocortisone 50mg IV to Acetaminophen and Diphenhydramine premeds and increased infusion time of Cetuximab to four hours followed by a period of observation for previous cycles, beginning with Cycle 1 Day 15. Per the treating MD additional pre-meds and infusion extension will continue for the patient's future infusion appts.      Rash-Maculo-papular Grade 1 Start date 8/28/2023- continues. No worsening of rash noted by the treating MD with today's examination.  Pt. confirms continued use of clindamycin gel and doxycycline as prescribed for sporadic areas of pruritic rash. Mild soaps, temped water, and moisturizers recommended. Erbitux skin care kit provided to patient as packaged from [employer]. The treating MD is aware, and a dermatological exam performed by Dr Santo during today did not reveal any rash on other parts of the patient's body, except the mild facial rash. The treating MD does not deem CS, and no new orders given.    Insomnia- Grade 2 (Start date 9/12/2023-continues) The treating MD deemed as unlikely d/t to protocol therapy, and does not deem as CS, no new orders given. Palliative prescribed Mirtazapine and on 10/18/2023 instructed patient  "to increase dose to15mg Q HS. The patient admitted to starting the 15mg dose as of last night (10/18/2023). The patient was reminded to avoid Trazodone while on Encorafenib.    Hypoalbuminemia- Grade 1 (Start date 10/4/2023 End date 10/18/2023) Pt encouraged to continue increasing protein intake and utilize supplemental drink high in protein.   Hypophosphatemia-Grade 1 (Start date 10/4/2023- continues) ordered by endocrinologist. Phosphorous level has not been re-drawn at this time. The treating MD deems unrelated to therapy, does not deem CS, and no new orders given. Endocrine monitoring.  Pain (Right proximal forefoot area)- Grade 2 (Start date 10/01/2023- End date 10/08/2023)   Eosinophilia-(Grade 1) (Start date 10/18/2023) The treating MD is aware, does not related to therapy, does not deem as CS, and no new orders given.    Vitamin D, 25 Hydroxy ordered per endocrinologist; reported less than normal limits-endocrine following; No CTCAE grading found. The treating MD does not relate to therapy, does not deem as CS, no new orders given.      /78  Pulse 104, Temp 96.9 Resp 16  Ht 5' 6" (1.676 m) Wt. 128kg (282 lb 3 oz) SpO2 96 % BSA 2.44m²      Zubrod PS: One    09OCT2023 CT scan: Recist Criteria reviewed by treating MD and calculation determined as stable disease. The treating MD is aware of the CT reporting, Right upper lobe pulmonary nodule appears slightly increased in size, and does not deem as CS and no new orders given, and plans to monitor.    See [id number] email communications with Dr. Arechiga allowing the CT Scan to take place on 09OCT2023 to accommodate the patient's plans. See source doc in C2 Day 15 section of the patient's shadow chart.     Dr. Santo assessed all labs, VS & any PE findings as either WNL or NCS. At the discretion of the treating MD, the patient's current cardiac function does not require evaluation by ECG.   Pt was deemed eligible to receive C 3 D 1 of protocol treatment " on 20OCT2023.    Treatment Dosing for C 3 D 1:  For the patient's safety, the treating MD plans to continue following the protocol's recommendations of adding Hydrocortisone to other premeds and extending the infusion time of the Cetuximab to prevent delayed infusion reaction.  Infusion time extended per protocol Cetuximab-Related Infusion Reactions (Table 3).  Hydrocortisone 50mg IV added to premedication of Acetaminophen and Diphenhydramine. Protocol Section 7.0 / 7.1    Time out for dosing of protocol treatment completed with the treating MD.  20OCT2023 treatment plan orders reviewed with Dr Santo prior to signing.     Since pt's wt. has not changed by 10% from original dosing, no dosing changes required.  Baseline BSA 2.46 m2 based on Screening: Ht. 5'6 and 287.26 lbs. (130.3 kg)      Cetuximab 500 mg/m2 x 2.46 m2 BSA = 1230 mg over 4 hours- Rounded dose via pharmacy policy resulted in a dose of 1200mg.  Nivolumab Flat dose of 480 mg  Encorafenib 75mg tabs: 300mg po days 1-28.  New supply verified. The patient returned remaining pills from last refill with correct count of eight capsules, which were taken to [facility] pharmacy for disposal, using a antineoplastic biohazard' bag. See completed pill diary in this section of the pt. shadow chart.     This CRC reviewed upcoming appointments with the patient and encouraged to call with any new symptoms or issues, the patient verbalized understanding. Patient made aware to call after hour number, that she agrees to having, if after business hours or over the weekend.     Next protocol required bio-specimen at C3D1 planned for collection in the am (10/20/2023)     Next 8-week CT C/A/P: 12/04/2023 @ 0930 (patient informed)     Cycle 3 Day 15  [id number] 1st floor lab @ 10:00   02NOV2023 OV with Dr. Pimentel @ 11:00 (Dr. Santo out of the office)  02NOV2023 Infusion at 11:30     DAYDAY Adams RN

## 2023-10-19 NOTE — PROGRESS NOTES
Gail Mckinnon, MRN [id number]  PID# [id number]     Protocol: SWOG   IRB#: [id number]  : NGUYEN Degroot MD  Treating Investigator: JESUS Santo MD     Randomized Phase II Trial of Encorafenib and Cetuximab with or Without Nivolumab ([facility] #[id number]) for Patients with Previously Treated, Microsatellite Stable BRAF V600E Metastatic and/or Unresectable Colorectal Cancer   Cycle 1 Day 15 Arm 1/ Nivolumab + Cetuximab + Encorafenib therapy.     19OCT2023  -->  Cycle 2 Day 28     C3 Day 1 - Planned for 20OCT2023.      The patient presented to the planned office visit alone. The patient was alert, oriented, without noted distress, with appropriate mood and affect for the situation, and freely agreed to continue with participating in the referenced study.     Pt completed the required pre-tx labs on 18OCT2023:  CBC w/ diff, CMP, Magnesium resulted and reviewed.   Protocol does not require TSH at this time point. Endocrinology following TSH for patient's baseline Grade II Hypothyroidism (resulted from treatment for Hyperthyroidism). See communication from study chair regarding TSH monitoring for this patient in Cycle 1 section of shadow chart.     [id number] PTH ordered per endocrinologist-monitoring Baseline Hyperparathyroidism. PTH remains elevated without change in grade. Endocrinologist following patient with planned office visit for 30OCT2023. The treating MD is aware, does not relate to therapy, does not deem CS, no new orders given.     [id number] Corrected calcium calculated as 11.2 mg/dL. The treating MD is aware, does not deem CS or r/t to protocol therapy. No new orders given.    [id number] Vitamin D level- Low @ 18ng/mL (30-96 ng/dL) institution reference defines 10-29ng/mL as Vitamin D insufficiency. Per endocrinologist's office on 10/18/2023, there has been no prescribed treatment or intervention since the 04OCT2023 lab was resulted. The office plans to address at the upcoming appt. on 30OCT2023. The treating  MD is aware, does not deem as CS or related to protocol therapy. No new orders given.     ConMed list reviewed with the patient for accuracy- see shadow chart for changes.     Patient admits to being compliant with QD dosing of encorafenib and the use of the protocol's pill diary.   The patient returned remaining encorafenib and protocol pill diary, with correct count.   Last dose of Encorafenib from Cycle 3 refill/supply expected to take place on Thursday 16NOV2023 (C3D28) Reviewed with patient.    Pt. made aware to call this CRC if she does not receive communication for next Encorafenib refill from [facility] pharmacy by second week in November.   The patient verbalized understanding to take Encorafenib #4 capsule with water in the morning at the same time each day. Pt. also agreed to taking before leaving home on the days of infusional therapy to provide one hour time frame before receiving oral pre-medications.     Baseline AEs:  See shadow chart for full list of Baseline AEs.     Baseline Hypothyroidism- Grade 2 (Start date 2012-continues) per documentation by Dr. Moss, the hypothyroidism resulted from treatment for hyperthyroidism.  Dr. Santo is aware of Synthroid dosing changes for Grade 2 BASELINE Hypothyroidism does not deem related to protocol treatment, nor CS, no new orders given.  Reviewed Section 8.3.1 Nivolumab dose mods with treating MD. The Grade II Hypothyroidism present at Baseline, has not increased in grade, and the patient is asymptomatic per the treating MD's PE. The patient denies any persistent headaches or visual changes.   See source doc communication with Dr. Arechiga, Study Chair, in Cycle 1 Day 15 section.  Baseline Fatigue- Grade 1- pt reports continues and able to perform all ADLS.  Baseline Hypercalcemia-Grade 1 continues- corrected calcium calculation attached= 11.2 mg/dL. Endocrinologist scheduled follow up appt. for patient 30OCT2023. Per Dr. Moss's office, no  treatment or intervention at this time. Evaluation planned at 30OCY2023 visit.  Baseline Hyperparathyroidism- Grade 2 (Start date 2021)- Continues. 04OCT2023- Endocrinologist ordered PTH remain elevated, trending downward, without change in grade. The treating MD is aware, does not deem CS, no new orders given. Endocrinologist following patient.     AEs:   See shadow chart for full list of AEs.    The patient specifically denies any nausea, diarrhea.     The patient denies experiencing any infusion related reaction including chills/rigors, nausea, vomiting, or headache during or after the 06OCT2023 infusion.  Per protocol Section 7 / 7.1 & Table 3 The treating MD added hydrocortisone 50mg IV to Acetaminophen and Diphenhydramine premeds and increased infusion time of Cetuximab to four hours followed by a period of observation for previous cycles, beginning with Cycle 1 Day 15. Per the treating MD additional pre-meds and infusion extension will continue for the patient's future infusion appts.      Rash-Maculo-papular Grade 1 Start date 8/28/2023- continues. No worsening of rash noted by the treating MD with today's examination.  Pt. confirms continued use of clindamycin gel and doxycycline as prescribed for sporadic areas of pruritic rash. Mild soaps, temped water, and moisturizers recommended. Erbitux skin care kit provided to patient as packaged from [employer]. The treating MD is aware, and a dermatological exam performed by Dr Santo during today did not reveal any rash on other parts of the patient's body, except the mild facial rash. The treating MD does not deem CS, and no new orders given.    Insomnia- Grade 2 (Start date 9/12/2023-continues) The treating MD deemed as unlikely d/t to protocol therapy, and does not deem as CS, no new orders given. Palliative prescribed Mirtazapine and on 10/18/2023 instructed patient to increase dose to15mg Q HS. The patient admitted to starting the 15mg dose as of last night  "(10/18/2023). The patient was reminded to avoid Trazodone while on Encorafenib.    Hypoalbuminemia- Grade 1 (Start date 10/4/2023 End date 10/18/2023) Pt encouraged to continue increasing protein intake and utilize supplemental drink high in protein.   Hypophosphatemia-Grade 1 (Start date 10/4/2023- continues) ordered by endocrinologist. Phosphorous level has not been re-drawn at this time. The treating MD deems unrelated to therapy, does not deem CS, and no new orders given. Endocrine monitoring.  Pain (Right proximal forefoot area)- Grade 2 (Start date 10/01/2023- End date 10/08/2023)   Eosinophilia-(Grade 1) (Start date 10/18/2023) The treating MD is aware, does not related to therapy, does not deem as CS, and no new orders given.    Vitamin D, 25 Hydroxy ordered per endocrinologist; reported less than normal limits-endocrine following; No CTCAE grading found. The treating MD does not relate to therapy, does not deem as CS, no new orders given.      /78  Pulse 104, Temp 96.9 Resp 16  Ht 5' 6" (1.676 m) Wt. 128kg (282 lb 3 oz) SpO2 96 % BSA 2.44m²      Zubrod PS: One    09OCT2023 CT scan: Recist Criteria reviewed by treating MD and calculation determined as stable disease. The treating MD is aware of the CT reporting, Right upper lobe pulmonary nodule appears slightly increased in size, and does not deem as CS and no new orders given, and plans to monitor.    See [id number] email communications with Dr. Arechiga allowing the CT Scan to take place on 09OCT2023 to accommodate the patient's plans. See source doc in C2 Day 15 section of the patient's shadow chart.     Dr. Santo assessed all labs, VS & any PE findings as either WNL or NCS. At the discretion of the treating MD, the patient's current cardiac function does not require evaluation by ECG.   Pt was deemed eligible to receive C 3 D 1 of protocol treatment on 20OCT2023.    Treatment Dosing for C 3 D 1:  For the patient's safety, the treating MD " plans to continue following the protocol's recommendations of adding Hydrocortisone to other premeds and extending the infusion time of the Cetuximab to prevent delayed infusion reaction.  Infusion time extended per protocol Cetuximab-Related Infusion Reactions (Table 3).  Hydrocortisone 50mg IV added to premedication of Acetaminophen and Diphenhydramine. Protocol Section 7.0 / 7.1    Time out for dosing of protocol treatment completed with the treating MD.  20OCT2023 treatment plan orders reviewed with Dr Santo prior to signing.     Since pt's wt. has not changed by 10% from original dosing, no dosing changes required.  Baseline BSA 2.46 m2 based on Screening: Ht. 5'6 and 287.26 lbs. (130.3 kg)      Cetuximab 500 mg/m2 x 2.46 m2 BSA = 1230 mg over 4 hours- Rounded dose via pharmacy policy resulted in a dose of 1200mg.  Nivolumab Flat dose of 480 mg  Encorafenib 75mg tabs: 300mg po days 1-28.  New supply verified. The patient returned remaining pills from last refill with correct count of eight capsules, which were taken to [facility] pharmacy for disposal, using a antineoplastic biohazard' bag. See completed pill diary in this section of the pt. shadow chart.     This CRC reviewed upcoming appointments with the patient and encouraged to call with any new symptoms or issues, the patient verbalized understanding. Patient made aware to call after hour number, that she agrees to having, if after business hours or over the weekend.     Next protocol required bio-specimen at C3D1 planned for collection in the am (10/20/2023)     Next 8-week CT C/A/P: 12/04/2023 @ 0930 (patient informed)     Cycle 3 Day 15  [id number] 1st floor lab @ 10:00   02NOV2023 OV with Dr. Pimentel @ 11:00 (Dr. Santo out of the office)  02NOV2023 Infusion at 11:30     DAYDAY Adams RN

## 2023-10-19 NOTE — PROGRESS NOTES
PROGRESS NOTE    Subjective:       Patient ID: Gail Mckinnon is a 53 y.o. female.  MRN: [id number]  : 1970    Chief Complaint: Metastatic colon cancer     History of Present Illness:   Gail Mckinnon is a 53 y.o. female who presents with colon cancer, initially stage III and now with LN recurrence.    On FOLIRI+ Avastin sine .     She was briefly switched to xeloda due to 5 FU shortage for a month. Did not tolerate Xeloda well due hand foot syndrome, blistering of feet, did not take the last day of Xeloda. Completed .     Resumed Folfiri + Avastin on 23. Atropine was held due to prior constipation.     Was admitted to the hospital from -23 for intractable nausea , vomiting and diarrhea as well as abdominal pain. No hematochezia or dark stool was noted.      US showed gallbladder stone and dilated CBD. She was managed conservatively. Had CT abd, pelvis, colonoscopy and MRCP that were relatively unremarkable without signs of cholecystitis. Cholecystectomy was not recommended.     She had recently resumed FOLFIRI and the symptoms started after the first cycle of resuming, never had issues prior to this.  Stayed in the hospital for 10 days.  C diff was negative. No other etiology was established. Symptoms improved over time and she was discharged on .     Interim history:  Enrolled in  SWOG 2107 (encorafenib/cetuximab and randomized to the Nivolumab arm), here to obtain for cycle 3, day 1, due tomorrow.    We reviewed re staging done as per research protocol, consistent with response to treatment.     Reports stable fatigue, skin rash, on doxycycline.     No recurrence of abdominal pain. No further infusion reactions. Has stable fatigue, rash, insomnia.     Noted Grave's disease history in , treated with LEE, now on synthroid.     Oncology History:  She completed adjuvant FOLFOX in 2020. She tolerated only 4  cycles of FOLFOX before she developed an infusion reaction to oxaliplatin in cycle 5 was well as cycle 6. She then completed 6 cycles of infusional 5 FU.     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 She was offered second line therapy with FOLFIRI and Avastin.      She presented to the ED on 11/26 with left flank pain. CT renal stone study showed Moderate hydronephrosis on the left secondary to 3 mm calculus at the UPJ.    She had a PET scan mid April that showed possible progression of her disease with      She has been to [facility] for another opinion. No change was recommended in systemic therapy but she was offered surgical removal of the aorta caval nodes.  Recent sans at [facility]  show stable disease.      Surgery done 7/12/22. Received 2 more cycle of FOLFIRI without Avastin.     She had repeat imaging at [facility] on 9/24/22 that unfortunately showed disease progression since her surgery with multiple RP nodes and one mediastinal node.       PET 12/8/22  Impression:     1. Progression of disease with interval increase of abdominal and pelvic lymphadenopathy.  2. New area of moderate FDG uptake at the right half of the T9 vertebral body (image 124).  Favor degenerative disc changes.  No underlying osteolytic or osteoblastic lesion.  Recommend continued attention on follow-up.  3. No other evidence of FDG avid disease.     12/22/22  Impression After scan comparison:     1. Metastatic portacaval and left periaortic retroperitoneal lymph nodes which remain stable between the CT of 09/24/2022 and the subsequent PET-CT of 12/08/2022.  There is no evidence of progression of metastatic disease.  2. Nonobstructing bilateral renal calculi and cholelithiasis.    2/10/22:  CT chest abd pelvis  Impression:     Stable periportal, retroperitoneal and mesenteric lymphadenopathy compared to PET-CT 12/08/2022.     Stable right middle lobe 3 mm pulmonary nodule.  No new or enlarging pulmonary nodule.     Hepatic  steatosis.  Hepatosplenomegaly.     Cholelithiasis.     Bilateral nonobstructing nephrolithiasis.     Small hiatal hernia with retained contrast in the distal esophagus.  Correlate for reflux.    She had virtual visit at [facility] on 2/17/23.     She has continued FOLFIRI and Avastin.     CT abd pelvis   5/7/23  Impression:     1. Mesenteric inflammatory changes have worsened since the prior study.  2. Mesenteric, retroperitoneal and left-sided pelvic enlarged lymph nodes are unchanged.     8/11/23  CT chest abd pelvis   Impression:     1. Patient with a history of colon adenocarcinoma with worsening periaortic, retroperitoneal, mesenteric, and iliac chain lymphadenopathy the in the abdomen and pelvis when compared with the previous study suggesting worsening metastatic disease.  2. Moderate left hydronephrosis and proximal left hydroureter to the level of possible retroperitoneal scarring.  Invasion/compression of the left ureter is not excluded.  3. Bilateral nephrolithiasis  4. Hepatomegaly and hepatic steatosis  5. Two tiny < 5 mm pulmonary nodules within the chest, unchanged.  No new pulmonary nodules.    10/9/23  CT chest abd pelvis     Impression:     Decreased size of retroperitoneal and mesenteric lymph nodes.  Stable left external iliac lymph node. Right upper lobe pulmonary nodule appears slightly increased in size.  No new pulmonary nodules.  Findings suggest mixed/partial response to therapy.     Cholelithiasis.     Hepatosplenomegaly.     Small pericardial effusion.     RECIST SUMMARY:     Date of prior examination for comparison: 08/11/2023     Lesion 1: Retroperitoneal soft tissue nodule but in the duodenum.  1.7 cm. Series 3 image 93.  Prior measurement 82.1 cm.     Lesion 2: Celiac axis lymph node.  1.5 cm. Series 3 image 56.  Prior measurement 1.8 cm.     Lesion 3: Portacaval lymph node.  1.0 cm. Series 3 image 59.  Prior measurement 1.2 cm.     Lesion 4: Mesenteric lymph node.  1.2 cm. Series 3 image  105.  Prior measurement 1.6 cm.       Oncology History:  Oncology History   Malignant neoplasm of sigmoid colon   3/16/2020 Initial Diagnosis    Malignant neoplasm of sigmoid colon     3/31/2020 Cancer Staged    Staging form: Colon and Rectum, AJCC 8th Edition  - Clinical stage from 3/31/2020: Stage IIIC (cT4b, cN2a, cM0)     5/6/2020 - 11/17/2020 Chemotherapy    Treatment Summary   Plan Name: OP FOLFOX 6 Q2W  Treatment Goal: Curative  Status: Inactive  Start Date: 5/6/2020  End Date: 11/6/2020  Provider: Dylan Leyva MD  Chemotherapy: fluorouraciL injection 945 mg, 400 mg/m2 = 945 mg, Intravenous, Clinic/[facility] 1 time, 14 of 14 cycles  Administration: 945 mg (5/6/2020), 945 mg (5/20/2020), 945 mg (6/3/2020), 945 mg (6/17/2020), 945 mg (7/29/2020), 945 mg (8/12/2020), 945 mg (8/26/2020), 945 mg (9/9/2020), 945 mg (9/23/2020), 945 mg (10/6/2020), 945 mg (10/21/2020), 945 mg (11/4/2020)  fluorouraciL 2,400 mg/m2 = 5,665 mg in sodium chloride 0.9% 240 mL chemo infusion, 2,400 mg/m2 = 5,665 mg, Intravenous, Over 46 hours, 14 of 14 cycles  Administration: 5,665 mg (5/6/2020), 5,665 mg (5/20/2020), 5,665 mg (6/3/2020), 5,665 mg (6/17/2020), 5,665 mg (7/29/2020), 5,665 mg (8/12/2020), 5,665 mg (8/26/2020), 5,665 mg (9/9/2020), 5,665 mg (9/23/2020), 5,665 mg (10/6/2020), 5,665 mg (10/21/2020), 5,665 mg (11/4/2020)  leucovorin calcium 900 mg in dextrose 5 % 250 mL infusion, 945 mg, Intravenous, Clinic/[facility] 1 time, 14 of 14 cycles  Administration: 900 mg (5/6/2020), 900 mg (5/20/2020), 900 mg (6/3/2020), 900 mg (6/17/2020), 945 mg (6/30/2020), 945 mg (7/20/2020), 945 mg (7/29/2020), 945 mg (8/12/2020), 945 mg (8/26/2020), 945 mg (9/9/2020), 945 mg (9/23/2020), 945 mg (10/6/2020), 945 mg (10/21/2020), 945 mg (11/4/2020)  oxaliplatin (ELOXATIN) 200 mg in dextrose 5 % 500 mL chemo infusion, 201 mg, Intravenous, Clinic/[facility] 1 time, 6 of 6 cycles  Dose modification: 65 mg/m2 (original dose 85 mg/m2, Cycle 6)  Administration: 200 mg  (5/6/2020), 200 mg (5/20/2020), 200 mg (6/3/2020), 200 mg (6/17/2020), 201 mg (6/30/2020), 150 mg (7/20/2020)     5/3/2021 Cancer Staged    Staging form: Colon and Rectum, AJCC 8th Edition  - Clinical stage from 5/3/2021: Stage Unknown (rcT0, cNX, cM1)     6/16/2021 - 8/2/2023 Chemotherapy    Treatment Summary   Plan Name: OP COLORECTAL FOLFIRI + BEVACIZUMAB Q2W  Treatment Goal: Control  Status: Inactive  Start Date: 6/16/2021  End Date: 8/2/2023  Provider: Marah Santo MD  Chemotherapy: fluorouraciL injection 990 mg, 400 mg/m2 = 990 mg, Intravenous, Clinic/[facility] 1 time, 15 of 15 cycles  Administration: 990 mg (6/16/2021), 990 mg (6/30/2021), 990 mg (7/14/2021), 980 mg (7/28/2021), 990 mg (8/11/2021), 990 mg (8/25/2021), 990 mg (9/8/2021), 990 mg (9/22/2021), 990 mg (10/6/2021), 990 mg (10/20/2021), 990 mg (11/3/2021), 990 mg (12/1/2021), 990 mg (12/15/2021), 990 mg (11/17/2021), 990 mg (12/28/2021)  fluorouraciL 2,400 mg/m2 = 5,950 mg in sodium chloride 0.9% 240 mL chemo infusion, 2,400 mg/m2 = 5,950 mg, Intravenous, Over 46 hours, 43 of 46 cycles  Administration: 5,950 mg (6/16/2021), 5,950 mg (6/30/2021), 5,950 mg (7/14/2021), 5,880 mg (7/28/2021), 5,930 mg (8/11/2021), 5,930 mg (8/25/2021), 5,930 mg (9/8/2021), 5,930 mg (9/22/2021), 5,930 mg (10/6/2021), 5,930 mg (10/20/2021), 5,930 mg (11/3/2021), 5,930 mg (12/1/2021), 5,930 mg (12/15/2021), 5,930 mg (11/17/2021), 5,930 mg (12/28/2021), 6,050 mg (5/11/2022), 6,025 mg (3/9/2022), 6,025 mg (2/23/2022), 5,930 mg (2/2/2022), 5,930 mg (1/19/2022), 6,050 mg (4/20/2022), 6,025 mg (4/6/2022), 6,025 mg (3/23/2022), 6,050 mg (6/1/2022), 6,050 mg (6/15/2022), 6,050 mg (10/19/2022), 6,050 mg (10/5/2022), 6,050 mg (11/2/2022), 6,050 mg (11/16/2022), 6,050 mg (11/30/2022), 6,050 mg (1/4/2023), 6,050 mg (12/19/2022), 6,050 mg (1/18/2023), 6,000 mg (5/22/2023), 6,000 mg (4/26/2023), 6,000 mg (4/11/2023), 6,000 mg (2/28/2023), 6,050 mg (2/14/2023), 6,000 mg (2/1/2023), 6,000  mg (7/5/2023), 6,000 mg (6/19/2023), 6,000 mg (6/5/2023), 6,000 mg (7/31/2023)  bevacizumab (AVASTIN) 600 mg in sodium chloride 0.9% 100 mL chemo infusion, 660 mg, Intravenous, [facility] 1 time, 36 of 36 cycles  Dose modification: 5 mg/kg (original dose 5 mg/kg, Cycle 26, Reason: MD Discretion, Comment: 5 mg/kg original dose)  Administration: 600 mg (6/30/2021), 600 mg (7/14/2021), 600 mg (7/28/2021), 600 mg (6/16/2021), 600 mg (8/11/2021), 655 mg (8/25/2021), 600 mg (9/8/2021), 600 mg (9/22/2021), 600 mg (10/6/2021), 600 mg (10/20/2021), 600 mg (11/3/2021), 655 mg (12/1/2021), 600 mg (12/15/2021), 600 mg (11/17/2021), 600 mg (12/28/2021), 600 mg (5/11/2022), 600 mg (3/9/2022), 600 mg (2/23/2022), 600 mg (2/2/2022), 600 mg (1/19/2022), 600 mg (4/20/2022), 680 mg (4/6/2022), 600 mg (3/23/2022), 680 mg (10/5/2022), 680 mg (10/19/2022), 680 mg (11/2/2022), 680 mg (11/16/2022), 680 mg (11/30/2022), 680 mg (1/4/2023), 680 mg (12/19/2022), 680 mg (1/18/2023), 680 mg (3/28/2023), 680 mg (3/14/2023), 680 mg (2/28/2023), 680 mg (2/14/2023), 680 mg (2/1/2023)  irinotecan (CAMPTOSAR) 440 mg in sodium chloride 0.9% 500 mL chemo infusion, 446 mg, Intravenous, [facility] 1 time, 45 of 48 cycles  Dose modification: 180 mg/m2 (original dose 180 mg/m2, Cycle 24)  Administration: 440 mg (6/16/2021), 440 mg (6/30/2021), 440 mg (7/14/2021), 440 mg (7/28/2021), 440 mg (8/11/2021), 444 mg (8/25/2021), 440 mg (9/8/2021), 440 mg (9/22/2021), 440 mg (10/6/2021), 440 mg (10/20/2021), 440 mg (11/3/2021), 440 mg (12/1/2021), 440 mg (12/15/2021), 440 mg (11/17/2021), 440 mg (12/28/2021), 440 mg (5/11/2022), 440 mg (3/9/2022), 440 mg (2/23/2022), 440 mg (2/2/2022), 440 mg (1/19/2022), 440 mg (4/20/2022), 440 mg (4/6/2022), 440 mg (3/24/2022), 440 mg (6/1/2022), 440 mg (6/15/2022), 440 mg (10/19/2022), 440 mg (10/5/2022), 440 mg (11/2/2022), 440 mg (11/16/2022), 440 mg (11/30/2022), 440 mg (1/4/2023), 440 mg (12/19/2022), 440 mg (1/18/2023), 440  mg (5/22/2023), 440 mg (4/26/2023), 440 mg (4/11/2023), 440 mg (3/28/2023), 440 mg (3/14/2023), 440 mg (2/28/2023), 440 mg (2/14/2023), 440 mg (2/1/2023), 440 mg (7/5/2023), 440 mg (6/19/2023), 440 mg (6/5/2023), 440 mg (7/31/2023)  bevacizumab-awwb (MVASI) 5 mg/kg = 680 mg in sodium chloride 0.9% 100 mL infusion, 5 mg/kg = 680 mg (100 % of original dose 5 mg/kg), Intravenous, Clinic/[facility] 1 time, 7 of 10 cycles  Dose modification: 5 mg/kg (original dose 5 mg/kg, Cycle 39)  Administration: 680 mg (4/11/2023), 680 mg (4/26/2023), 680 mg (5/22/2023), 680 mg (7/5/2023), 680 mg (6/19/2023), 680 mg (6/5/2023), 680 mg (7/31/2023)     8/17/2023 - 8/18/2023 Chemotherapy    Treatment Summary   Plan Name: OP CETUXIMAB 500MG Q2W  Treatment Goal: Control  Status: Inactive  Start Date:   End Date:   Provider: Marah Santo MD  Chemotherapy: [No matching medication found in this treatment plan]     8/24/2023 -  Chemotherapy    Treatment Summary   Plan Name: [facility] - ARM 1 ENCORAFENIB  CETUXIMAB NIVOLUMAB  Treatment Goal: Palliative  Status: Active  Start Date: 8/24/2023  End Date: 7/12/2024 (Planned)  Provider: Marah Santo MD  Chemotherapy: cetuximab (ERBITUX) 100 mg/50 mL chemo infusion 1,200 mg, 1,230 mg, Intravenous, Clinic/[facility] 1 time, 2 of 12 cycles  Administration: 1,200 mg (8/24/2023), 1,200 mg (9/8/2023), 1,200 mg (9/22/2023), 1,200 mg (10/6/2023)  encorafenib 75 mg Cap, 300 mg, Oral, Daily, 1 of 1 cycle, Start date: --, End date: --     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   4/3/2020 Initial Diagnosis    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     5/6/2020 - 11/17/2020 Chemotherapy    Treatment Summary   Plan Name: OP FOLFOX 6 Q2W  Treatment Goal: Curative  Status: Inactive  Start Date: 5/6/2020  End Date: 11/6/2020  Provider: Dylan Leyva MD  Chemotherapy: fluorouraciL injection 945 mg, 400 mg/m2 = 945 mg, Intravenous, Clinic/[facility] 1 time, 14 of 14 cycles  Administration: 945 mg (5/6/2020), 945 mg (5/20/2020), 945  mg (6/3/2020), 945 mg (6/17/2020), 945 mg (7/29/2020), 945 mg (8/12/2020), 945 mg (8/26/2020), 945 mg (9/9/2020), 945 mg (9/23/2020), 945 mg (10/6/2020), 945 mg (10/21/2020), 945 mg (11/4/2020)  fluorouraciL 2,400 mg/m2 = 5,665 mg in sodium chloride 0.9% 240 mL chemo infusion, 2,400 mg/m2 = 5,665 mg, Intravenous, Over 46 hours, 14 of 14 cycles  Administration: 5,665 mg (5/6/2020), 5,665 mg (5/20/2020), 5,665 mg (6/3/2020), 5,665 mg (6/17/2020), 5,665 mg (7/29/2020), 5,665 mg (8/12/2020), 5,665 mg (8/26/2020), 5,665 mg (9/9/2020), 5,665 mg (9/23/2020), 5,665 mg (10/6/2020), 5,665 mg (10/21/2020), 5,665 mg (11/4/2020)  leucovorin calcium 900 mg in dextrose 5 % 250 mL infusion, 945 mg, Intravenous, Clinic/[facility] 1 time, 14 of 14 cycles  Administration: 900 mg (5/6/2020), 900 mg (5/20/2020), 900 mg (6/3/2020), 900 mg (6/17/2020), 945 mg (6/30/2020), 945 mg (7/20/2020), 945 mg (7/29/2020), 945 mg (8/12/2020), 945 mg (8/26/2020), 945 mg (9/9/2020), 945 mg (9/23/2020), 945 mg (10/6/2020), 945 mg (10/21/2020), 945 mg (11/4/2020)  oxaliplatin (ELOXATIN) 200 mg in dextrose 5 % 500 mL chemo infusion, 201 mg, Intravenous, Clinic/[facility] 1 time, 6 of 6 cycles  Dose modification: 65 mg/m2 (original dose 85 mg/m2, Cycle 6)  Administration: 200 mg (5/6/2020), 200 mg (5/20/2020), 200 mg (6/3/2020), 200 mg (6/17/2020), 201 mg (6/30/2020), 150 mg (7/20/2020)     6/16/2021 - 8/2/2023 Chemotherapy    Treatment Summary   Plan Name: OP COLORECTAL FOLFIRI + BEVACIZUMAB Q2W  Treatment Goal: Control  Status: Inactive  Start Date: 6/16/2021  End Date: 8/2/2023  Provider: Marah Santo MD  Chemotherapy: fluorouraciL injection 990 mg, 400 mg/m2 = 990 mg, Intravenous, Clinic/[facility] 1 time, 15 of 15 cycles  Administration: 990 mg (6/16/2021), 990 mg (6/30/2021), 990 mg (7/14/2021), 980 mg (7/28/2021), 990 mg (8/11/2021), 990 mg (8/25/2021), 990 mg (9/8/2021), 990 mg (9/22/2021), 990 mg (10/6/2021), 990 mg (10/20/2021), 990 mg (11/3/2021), 990 mg  (12/1/2021), 990 mg (12/15/2021), 990 mg (11/17/2021), 990 mg (12/28/2021)  fluorouraciL 2,400 mg/m2 = 5,950 mg in sodium chloride 0.9% 240 mL chemo infusion, 2,400 mg/m2 = 5,950 mg, Intravenous, Over 46 hours, 43 of 46 cycles  Administration: 5,950 mg (6/16/2021), 5,950 mg (6/30/2021), 5,950 mg (7/14/2021), 5,880 mg (7/28/2021), 5,930 mg (8/11/2021), 5,930 mg (8/25/2021), 5,930 mg (9/8/2021), 5,930 mg (9/22/2021), 5,930 mg (10/6/2021), 5,930 mg (10/20/2021), 5,930 mg (11/3/2021), 5,930 mg (12/1/2021), 5,930 mg (12/15/2021), 5,930 mg (11/17/2021), 5,930 mg (12/28/2021), 6,050 mg (5/11/2022), 6,025 mg (3/9/2022), 6,025 mg (2/23/2022), 5,930 mg (2/2/2022), 5,930 mg (1/19/2022), 6,050 mg (4/20/2022), 6,025 mg (4/6/2022), 6,025 mg (3/23/2022), 6,050 mg (6/1/2022), 6,050 mg (6/15/2022), 6,050 mg (10/19/2022), 6,050 mg (10/5/2022), 6,050 mg (11/2/2022), 6,050 mg (11/16/2022), 6,050 mg (11/30/2022), 6,050 mg (1/4/2023), 6,050 mg (12/19/2022), 6,050 mg (1/18/2023), 6,000 mg (5/22/2023), 6,000 mg (4/26/2023), 6,000 mg (4/11/2023), 6,000 mg (2/28/2023), 6,050 mg (2/14/2023), 6,000 mg (2/1/2023), 6,000 mg (7/5/2023), 6,000 mg (6/19/2023), 6,000 mg (6/5/2023), 6,000 mg (7/31/2023)  bevacizumab (AVASTIN) 600 mg in sodium chloride 0.9% 100 mL chemo infusion, 660 mg, Intravenous, Clinic/[facility] 1 time, 36 of 36 cycles  Dose modification: 5 mg/kg (original dose 5 mg/kg, Cycle 26, Reason: MD Discretion, Comment: 5 mg/kg original dose)  Administration: 600 mg (6/30/2021), 600 mg (7/14/2021), 600 mg (7/28/2021), 600 mg (6/16/2021), 600 mg (8/11/2021), 655 mg (8/25/2021), 600 mg (9/8/2021), 600 mg (9/22/2021), 600 mg (10/6/2021), 600 mg (10/20/2021), 600 mg (11/3/2021), 655 mg (12/1/2021), 600 mg (12/15/2021), 600 mg (11/17/2021), 600 mg (12/28/2021), 600 mg (5/11/2022), 600 mg (3/9/2022), 600 mg (2/23/2022), 600 mg (2/2/2022), 600 mg (1/19/2022), 600 mg (4/20/2022), 680 mg (4/6/2022), 600 mg (3/23/2022), 680 mg (10/5/2022), 680 mg  (10/19/2022), 680 mg (11/2/2022), 680 mg (11/16/2022), 680 mg (11/30/2022), 680 mg (1/4/2023), 680 mg (12/19/2022), 680 mg (1/18/2023), 680 mg (3/28/2023), 680 mg (3/14/2023), 680 mg (2/28/2023), 680 mg (2/14/2023), 680 mg (2/1/2023)  irinotecan (CAMPTOSAR) 440 mg in sodium chloride 0.9% 500 mL chemo infusion, 446 mg, Intravenous, Clinic/[facility] 1 time, 45 of 48 cycles  Dose modification: 180 mg/m2 (original dose 180 mg/m2, Cycle 24)  Administration: 440 mg (6/16/2021), 440 mg (6/30/2021), 440 mg (7/14/2021), 440 mg (7/28/2021), 440 mg (8/11/2021), 444 mg (8/25/2021), 440 mg (9/8/2021), 440 mg (9/22/2021), 440 mg (10/6/2021), 440 mg (10/20/2021), 440 mg (11/3/2021), 440 mg (12/1/2021), 440 mg (12/15/2021), 440 mg (11/17/2021), 440 mg (12/28/2021), 440 mg (5/11/2022), 440 mg (3/9/2022), 440 mg (2/23/2022), 440 mg (2/2/2022), 440 mg (1/19/2022), 440 mg (4/20/2022), 440 mg (4/6/2022), 440 mg (3/24/2022), 440 mg (6/1/2022), 440 mg (6/15/2022), 440 mg (10/19/2022), 440 mg (10/5/2022), 440 mg (11/2/2022), 440 mg (11/16/2022), 440 mg (11/30/2022), 440 mg (1/4/2023), 440 mg (12/19/2022), 440 mg (1/18/2023), 440 mg (5/22/2023), 440 mg (4/26/2023), 440 mg (4/11/2023), 440 mg (3/28/2023), 440 mg (3/14/2023), 440 mg (2/28/2023), 440 mg (2/14/2023), 440 mg (2/1/2023), 440 mg (7/5/2023), 440 mg (6/19/2023), 440 mg (6/5/2023), 440 mg (7/31/2023)  bevacizumab-awwb (MVASI) 5 mg/kg = 680 mg in sodium chloride 0.9% 100 mL infusion, 5 mg/kg = 680 mg (100 % of original dose 5 mg/kg), Intravenous, Clinic/\[facility]\"" 1 time, 7 of 10 cycles  Dose modification: 5 mg/kg (original dose 5 mg/kg, Cycle 39)  Administration: 680 mg (4/11/2023), 680 mg (4/26/2023), 680 mg (5/22/2023), 680 mg (7/5/2023), 680 mg (6/19/2023), 680 mg (6/5/2023), 680 mg (7/31/2023)     8/17/2023 - 8/18/2023 Chemotherapy    Treatment Summary   Plan Name: OP CETUXIMAB 500MG Q2W  Treatment Goal: Control  Status: Inactive  Start Date:   End Date:   Provider: Marah Santo  MD  Chemotherapy: [No matching medication found in this treatment plan]     2023 -  Chemotherapy    Treatment Summary   Plan Name: [facility] - ARM 1 ENCORAFENIB  CETUXIMAB NIVOLUMAB  Treatment Goal: Palliative  Status: Active  Start Date: 2023  End Date: 2024 (Planned)  Provider: Marah Santo MD  Chemotherapy: cetuximab (ERBITUX) 100 mg/50 mL chemo infusion 1,200 mg, 1,230 mg, Intravenous, Clinic/[facility] 1 time, 2 of 12 cycles  Administration: 1,200 mg (2023), 1,200 mg (2023), 1,200 mg (2023), 1,200 mg (10/6/2023)  encorafenib 75 mg Cap, 300 mg, Oral, Daily, 1 of 1 cycle, Start date: --, End date: --         History:  Past Medical History:   Diagnosis Date    Anxiety     Depression     FH: ovarian cancer 3/16/2020    Hx of psychiatric care     Effexor, Paxil, Lexapro, Zoloft, Wellbutrin, Trazodone Buspar    Hyperthyroidism     Hypothyroid     Kidney calculi     Malignant neoplasm of sigmoid colon 3/16/2020    Menorrhagia     Multinodular goiter 2012    Palpitation     Psychiatric problem     Venous insufficiency       Past Surgical History:   Procedure Laterality Date     SECTION, CLASSIC      x3    COLONOSCOPY N/A 2020    Procedure: COLONOSCOPY;  Surgeon: Shane Parker MD;  Location: [facility];  Service: Endoscopy;  Laterality: N/A;    COLONOSCOPY N/A 2022    Procedure: COLONOSCOPY;  Surgeon: Shane Parker MD;  Location: [facility];  Service: Endoscopy;  Laterality: N/A;    COLONOSCOPY N/A 2023    Procedure: COLONOSCOPY;  Surgeon: Shane Parker MD;  Location: [facility];  Service: Endoscopy;  Laterality: N/A;  no cecum anastomosis    CYSTOSCOPY W/ URETERAL STENT PLACEMENT Bilateral 3/25/2020    Procedure: CYSTOSCOPY, WITH URETERAL STENT INSERTION;  Surgeon: Claudio Tyson MD;  Location: [address];  Service: Urology;  Laterality: Bilateral;    INSERTION OF TUNNELED CENTRAL VENOUS CATHETER (CVC) WITH SUBCUTANEOUS PORT N/A 2020     Procedure: [id number];  Surgeon: Stephen Diagnostic Provider;  [facility];  Service: Radiology;  Laterality: N/A;  Room 189/Cindy    LAPAROSCOPIC SIGMOIDECTOMY N/A 3/25/2020    Procedure: COLECTOMY, SIGMOID, LAPAROSCOPIC, flex sig, ERAS high;  Surgeon: Silvio Man MD;  [facility];  Service: Colon and Rectal;  Laterality: N/A;    MOBILIZATION OF SPLENIC FLEXURE  3/25/2020    Procedure: MOBILIZATION, SPLENIC FLEXURE;  Surgeon: Silvio Man MD;  [facility];  Service: Colon and Rectal;;    tonsillectomy      TOTAL ABDOMINAL HYSTERECTOMY W/ BILATERAL SALPINGOOPHORECTOMY N/A 3/25/2020    Procedure: HYSTERECTOMY, TOTAL, ABDOMINAL, WITH BILATERAL SALPINGO-OOPHORECTOMY;  Surgeon: Keron Brady MD;  Location: [facility] OR [address] [place] [address];  Service: Oncology;  Laterality: N/A;     Family History   Problem Relation Age of Onset    Heart disease Father     Diabetes Mother 65    Drug abuse Brother     Drug abuse Brother     Cancer Maternal Aunt         lung cancer    Cancer Maternal Grandmother         stomach cancer- started in ovaries    Ovarian cancer Maternal Grandmother         stomach cancer- started in ovaries    Colon cancer Paternal Uncle     Cancer Paternal Uncle     Ovarian cancer Maternal Aunt     Ovarian cancer Maternal Aunt     Ovarian cancer Cousin         mother had ovarian cancer    Drug abuse Son     Drug abuse Son         clean/sober since 2012    Colon cancer Son     No Known Problems Daughter     Uterine cancer Neg Hx     Breast cancer Neg Hx      Social History     Tobacco Use    Smoking status: Never    Smokeless tobacco: Never   Substance and Sexual Activity    Alcohol use: Yes     Comment: occasionally- twice monthly    Drug use: Never    Sexual activity: Yes     Partners: Male     Birth control/protection: See Surgical Hx        ROS:   Review of Systems   Constitutional:  Positive for malaise/fatigue. Negative for fever and weight loss.  "  HENT:  Negative for congestion, hearing loss, nosebleeds and sore throat.    Eyes:  Negative for double vision and photophobia.   Respiratory:  Negative for cough, hemoptysis, sputum production, shortness of breath and wheezing.    Cardiovascular:  Negative for chest pain, palpitations, orthopnea and leg swelling.   Gastrointestinal:  Positive for nausea (improving). Negative for abdominal pain, blood in stool, constipation, diarrhea, heartburn and vomiting.   Genitourinary:  Negative for dysuria, frequency, hematuria and urgency.   Musculoskeletal:  Negative for back pain, joint pain and myalgias.   Skin:  Positive for rash. Negative for itching.   Neurological:  Negative for dizziness, tingling, seizures, weakness and headaches.   Endo/Heme/Allergies:  Negative for polydipsia. Does not bruise/bleed easily.   Psychiatric/Behavioral:  Negative for depression and memory loss. The patient is nervous/anxious. The patient does not have insomnia.         Objective:     Vitals:    10/19/23 0936   BP: 122/78   Pulse: 104   Resp: 16   Temp: 96.9 °F (36.1 °C)   TempSrc: Temporal   SpO2: 96%   Weight: 128 kg (282 lb 3 oz)   Height: 5' 6" (1.676 m)   PainSc: 0-No pain             Wt Readings from Last 10 Encounters:   10/19/23 128 kg (282 lb 3 oz)   10/18/23 129.2 kg (284 lb 13.4 oz)   10/06/23 128.9 kg (284 lb 2.8 oz)   10/05/23 128.9 kg (284 lb 2.8 oz)   09/22/23 127.5 kg (281 lb 1.4 oz)   09/21/23 127.5 kg (281 lb 1.4 oz)   09/08/23 128.2 kg (282 lb 10.1 oz)   09/07/23 128.2 kg (282 lb 10.1 oz)   09/06/23 129.3 kg (285 lb 0.9 oz)   08/24/23 132.1 kg (291 lb 3.6 oz)       Physical Examination:   Physical Exam  Vitals and nursing note reviewed.   Constitutional:       General: She is not in acute distress.     Appearance: She is not diaphoretic.   HENT:      Head: Normocephalic.      Mouth/Throat:      Pharynx: No oropharyngeal exudate.   Eyes:      General: No scleral icterus.     Conjunctiva/sclera: Conjunctivae normal. "   Neck:      Thyroid: No thyromegaly.   Cardiovascular:      Rate and Rhythm: Normal rate and regular rhythm.      Heart sounds: Normal heart sounds. No murmur heard.  Pulmonary:      Effort: Pulmonary effort is normal. No respiratory distress.      Breath sounds: No stridor. No wheezing or rales.   Chest:      Chest wall: No tenderness.   Abdominal:      General: Bowel sounds are normal. There is no distension.      Palpations: Abdomen is soft. There is no mass.      Tenderness: There is no abdominal tenderness. There is no rebound.   Musculoskeletal:         General: No tenderness or deformity. Normal range of motion.      Cervical back: Neck supple.   Lymphadenopathy:      Cervical: No cervical adenopathy.   Skin:     General: Skin is warm and dry.      Findings: Rash (grade 1 facial rash, at baseline) present. No erythema.   Neurological:      Mental Status: She is alert and oriented to person, place, and time.      Cranial Nerves: No cranial nerve deficit.      Coordination: Coordination normal.      Gait: Gait is intact.   Psychiatric:         Mood and Affect: Affect normal.         Cognition and Memory: Memory normal.         Judgment: Judgment normal.          Diagnostic Tests:  Significant Imaging: I have reviewed and interpreted all pertinent imaging results/findings.  Laboratory Data:  All pertinent labs have been reviewed.    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 Oxaliplatin was stopped due to severe adverse reaction.     Follow-up CTs and PET in May 2021 showed enlarging retroperitoneal node, concerning for metastatic disease.      She started FOLFIRI + Avastin and has continued till July 2022.   Given isolated RP toni disease, she has undergone open Left periaortic and aortocaval lymph node dissection on 7/12/2022. Resumed FOLFIRI+ Debo with relatively stable disease but now has disease progression.     She has been enrolled into  SWOG 2107 (encorafenib/cetuximab + nivo) , cleared for cycle 3, D1. On additional pre medications given infusion reaction to Cetuximab, continue to monitor closely.     Scans reviewed, responsive disease.  Follow up per research protocol.     Grade 1 dermatitis   Noted, continue topical hydrocortisone and clindamycin gel, remains on oral doxycycline.     Neoplasm pain  Improving. Follow up with palliative care.     Hydronephrosis   Follow up with Urology for co management. No acute intervention was recommended.     Nausea   Continue compazine. Continue sacuso patch as needed.     Constipation  Start sennakot and daily Miralax.     Transaminitis  Fluctuates.  Continue to monitor. No obvious liver mets.     Hypercalcemia   Mild, secondary to hyperparathyroidism.  Avoid calcium supplements. Continue Endocrine follow up.     Hypothyroidism  Multinodular goiter   Continue Levothyroxine.  Had a non diagnostic biopsy in 2012, usg findings have remained stable. Reviewed repeat thyroid US, stable, will follow up with Endocrine.     Essential HTN   Continue antihypertensives.      Anxiety   Continue to  follow up with Onc psych.    MDM includes  :    - Acute or chronic illness or injury that poses a threat to life or bodily function  - Independent review and explanation of 3+ results from unique tests  - Discussion of  management and ordering 3+ unique tests  - Extensive discussion of treatment and management  - Prescription drug management  - Drug therapy requiring intensive monitoring for toxicity          ECOG SCORE    1 - Restricted in strenuous activity-ambulatory and able to carry out work of a light nature           Discussion:   No follow-ups on file.    Plan was discussed with the patient at length, and she verbalized understanding. Gail was given an opportunity to ask questions that were answered to her satisfaction, and she was advised to call in the interval if any problems or questions arise.    Electronically signed by Marah Santo MD        Route Chart for Scheduling    Med Onc Chart Routing      Follow up with physician . 11/16 am appt overbook, 11/30 afternoon 2:40 pm overbook   Follow up with TAVO    Infusion scheduling note    Injection scheduling note    Labs    Imaging    Pharmacy appointment    Other referrals                  Treatment Plan Information   [facility] - ARM 1 ENCORAFENIB  CETUXIMAB NIVOLUMAB   Marah Santo MD   Upcoming Treatment Dates - [facility] - ARM 1 ENCORAFENIB  CETUXIMAB NIVOLUMAB    10/20/2023       Pre-Medications       diphenhydrAMINE (BENADRYL) 50 mg in NS 50 mL IVPB       acetaminophen tablet 650 mg       hydrocortisone sodium succinate injection 50 mg       ondansetron injection 8 mg       Chemotherapy       cetuximab (ERBITUX) 100 mg/50 mL chemo infusion 1,230 mg       INV nivolumab 480 mg in INV sodium chloride 0.9 % 100 mL chemo infusion  11/3/2023       Pre-Medications       hydrocortisone sodium succinate injection 50 mg       diphenhydrAMINE (BENADRYL) 50 mg in NS 50 mL IVPB       acetaminophen tablet 650 mg       ondansetron injection 8 mg       Chemotherapy       cetuximab (ERBITUX) 100 mg/50 mL chemo infusion 1,230 mg  11/17/2023       Pre-Medications       ondansetron injection 8 mg       hydrocortisone sodium succinate injection 50 mg       diphenhydrAMINE (BENADRYL)  50 mg in NS 50 mL IVPB       acetaminophen tablet 650 mg       Chemotherapy       cetuximab (ERBITUX) 100 mg/50 mL chemo infusion 1,230 mg       INV nivolumab 480 mg in INV sodium chloride 0.9 % 100 mL chemo infusion  12/1/2023       Pre-Medications       ondansetron injection 8 mg       hydrocortisone sodium succinate injection 50 mg       diphenhydrAMINE (BENADRYL) 50 mg in NS 50 mL IVPB       acetaminophen tablet 650 mg       Chemotherapy       cetuximab (ERBITUX) 100 mg/50 mL chemo infusion 1,230 mg    Supportive Plan Information  IV FLUIDS AND ELECTROLYTES   Brayden Solo MD   Upcoming Treatment Dates - IV FLUIDS AND ELECTROLYTES    No upcoming days in selected categories.    Therapy Plan Information  PORT FLUSH  Flushes  heparin, porcine (PF) 100 unit/mL injection flush 500 Units  500 Units, Intravenous, Every visit  sodium chloride 0.9% flush 10 mL  10 mL, Intravenous, Every visit

## 2023-10-20 ENCOUNTER — DOCUMENTATION ONLY (OUTPATIENT)
Dept: INFUSION THERAPY | Facility: HOSPITAL | Age: 53
End: 2023-10-20
Payer: MEDICAID

## 2023-10-20 ENCOUNTER — RESEARCH ENCOUNTER (OUTPATIENT)
Dept: RESEARCH | Facility: HOSPITAL | Age: 53
End: 2023-10-20
Payer: MEDICAID

## 2023-10-20 ENCOUNTER — INFUSION (OUTPATIENT)
Dept: INFUSION THERAPY | Facility: HOSPITAL | Age: 53
End: 2023-10-20
Attending: INTERNAL MEDICINE
Payer: MEDICAID

## 2023-10-20 VITALS
HEIGHT: 66 IN | BODY MASS INDEX: 45.35 KG/M2 | WEIGHT: 282.19 LBS | DIASTOLIC BLOOD PRESSURE: 60 MMHG | RESPIRATION RATE: 18 BRPM | SYSTOLIC BLOOD PRESSURE: 119 MMHG | TEMPERATURE: 98 F | HEART RATE: 97 BPM

## 2023-10-20 DIAGNOSIS — C77.2 METASTASIS TO INTESTINAL LYMPH NODE: Primary | ICD-10-CM

## 2023-10-20 DIAGNOSIS — C18.7 MALIGNANT NEOPLASM OF SIGMOID COLON: ICD-10-CM

## 2023-10-20 DIAGNOSIS — F32.A DEPRESSIVE DISORDER: ICD-10-CM

## 2023-10-20 DIAGNOSIS — Z51.5 PALLIATIVE CARE BY SPECIALIST: ICD-10-CM

## 2023-10-20 PROCEDURE — 96413 CHEMO IV INFUSION 1 HR: CPT | Mod: PN

## 2023-10-20 PROCEDURE — 96415 CHEMO IV INFUSION ADDL HR: CPT | Mod: PN

## 2023-10-20 PROCEDURE — 96417 CHEMO IV INFUS EACH ADDL SEQ: CPT | Mod: PN

## 2023-10-20 PROCEDURE — 96375 TX/PRO/DX INJ NEW DRUG ADDON: CPT | Mod: PN

## 2023-10-20 PROCEDURE — 96367 TX/PROPH/DG ADDL SEQ IV INF: CPT | Mod: PN

## 2023-10-20 PROCEDURE — 25000003 PHARM REV CODE 250: Mod: PN | Performed by: INTERNAL MEDICINE

## 2023-10-20 PROCEDURE — 63600175 PHARM REV CODE 636 W HCPCS: Mod: PN | Performed by: INTERNAL MEDICINE

## 2023-10-20 RX ORDER — ONDANSETRON 2 MG/ML
8 INJECTION INTRAMUSCULAR; INTRAVENOUS
Status: COMPLETED | OUTPATIENT
Start: 2023-10-20 | End: 2023-10-20

## 2023-10-20 RX ORDER — HEPARIN 100 UNIT/ML
500 SYRINGE INTRAVENOUS
Status: DISCONTINUED | OUTPATIENT
Start: 2023-10-20 | End: 2023-10-20 | Stop reason: HOSPADM

## 2023-10-20 RX ORDER — DIPHENHYDRAMINE HYDROCHLORIDE 50 MG/ML
50 INJECTION INTRAMUSCULAR; INTRAVENOUS ONCE AS NEEDED
Status: DISCONTINUED | OUTPATIENT
Start: 2023-10-20 | End: 2023-10-20 | Stop reason: HOSPADM

## 2023-10-20 RX ORDER — DULOXETIN HYDROCHLORIDE 30 MG/1
60 CAPSULE, DELAYED RELEASE ORAL DAILY
Qty: 60 CAPSULE | Refills: 0 | Status: SHIPPED | OUTPATIENT
Start: 2023-10-20 | End: 2023-11-14 | Stop reason: SDUPTHER

## 2023-10-20 RX ORDER — EPINEPHRINE 0.3 MG/.3ML
0.3 INJECTION SUBCUTANEOUS ONCE AS NEEDED
Status: DISCONTINUED | OUTPATIENT
Start: 2023-10-20 | End: 2023-10-20 | Stop reason: HOSPADM

## 2023-10-20 RX ORDER — ACETAMINOPHEN 325 MG/1
650 TABLET ORAL
Status: COMPLETED | OUTPATIENT
Start: 2023-10-20 | End: 2023-10-20

## 2023-10-20 RX ADMIN — ONDANSETRON 8 MG: 2 INJECTION INTRAMUSCULAR; INTRAVENOUS at 08:10

## 2023-10-20 RX ADMIN — ACETAMINOPHEN 650 MG: 325 TABLET, FILM COATED ORAL at 08:10

## 2023-10-20 RX ADMIN — SODIUM CHLORIDE: 9 INJECTION, SOLUTION INTRAVENOUS at 08:10

## 2023-10-20 RX ADMIN — CETUXIMAB 1200 MG: 2 SOLUTION INTRAVENOUS at 09:10

## 2023-10-20 RX ADMIN — Medication 500 UNITS: at 03:10

## 2023-10-20 RX ADMIN — HYDROCORTISONE SODIUM SUCCINATE 50 MG: 100 INJECTION, POWDER, FOR SOLUTION INTRAMUSCULAR; INTRAVENOUS at 09:10

## 2023-10-20 RX ADMIN — DIPHENHYDRAMINE HYDROCHLORIDE 50 MG: 50 INJECTION, SOLUTION INTRAMUSCULAR; INTRAVENOUS at 09:10

## 2023-10-20 NOTE — PROGRESS NOTES
Gail Mckinnon, MRN [id number]  PID# [id number]     Protocol: SWOG   IRB#: 2023.047  : NGUYEN Degroot MD  Treating Investigator: JESUS Santo MD     Randomized Phase II Trial of Encorafenib and Cetuximab with or Without Nivolumab (NSC #[id number]) for Patients with Previously Treated, Microsatellite Stable BRAF V600E Metastatic and/or Unresectable Colorectal Cancer   Cycle 1 Day 15 Arm 1/ Nivolumab + Cetuximab + Encorafenib therapy.     [id number]     Cycle 3 Day 1     The patient presented to the [facility] 3rd floor infusion center this am for cycle 3 Day 1 protocol treatment. - MAR attached. Vital signs WNL  The patient was seen by the treating MD yesterday and deemed eligible for C3 D1    The patient was alert, oriented, without noted distress, with appropriate mood and affect for the situation, and freely agreed to continue with participating in the referenced study and consented to blood specimens for banking.  Samples collected by infusion nurse from patient's port. 10cc of blood was collected and discarded prior to the collection of study samples.  Samples were prepared, labeled per protocol, and shipped with [facility]. See source docs.    Patient tolerated blood draw from port well, denies any new issues from yesterday's office visit.    Next specimens, per protocol due, at progression.     CHANDRIKA Adams

## 2023-10-20 NOTE — PROGRESS NOTES
SW met with pt to provide support. Pt reported she received good news on her last scan. She said all but one lymph node has shrunk in size. Pt was very happy and related she really needed some good news. Pt is on a trial and is pleaded with the results. Pt shared her journey from when she first got her diagnosis to present.     Radha Giordano, JENNIEW

## 2023-10-20 NOTE — PLAN OF CARE
Pt arrived to clinic today for Opdivo and Erbitux infusions and tolerated well. No changes throughout therapy. Pt aware of follow up appointments and side effects of drugs. Discharged to home. NAD.

## 2023-10-20 NOTE — PROGRESS NOTES
Oncology Nutrition   Chemotherapy Infusion Visit    Nutrition Follow Up   RD met with patient at chairside during infusion treatment. Pt reports continues to do well nutritionally- eating without difficulty, maintaining weight, and denies nutrition related side effects. RD provided patient with a few samples of Ensure Max Protein for her to drink here, as well as coupons. Pt very appreciative.     Pt forgot TFP order after last infusion so will plan to get today.    Wt Readings from Last 10 Encounters:   10/20/23 128 kg (282 lb 3 oz)   10/19/23 128 kg (282 lb 3 oz)   10/18/23 129.2 kg (284 lb 13.4 oz)   10/06/23 128.9 kg (284 lb 2.8 oz)   10/05/23 128.9 kg (284 lb 2.8 oz)   09/22/23 127.5 kg (281 lb 1.4 oz)   09/21/23 127.5 kg (281 lb 1.4 oz)   09/08/23 128.2 kg (282 lb 10.1 oz)   09/07/23 128.2 kg (282 lb 10.1 oz)   09/06/23 129.3 kg (285 lb 0.9 oz)       All other nutrition questions/concerns addressed as appropriate. Will continue to follow and monitor throughout treatment PRN.     Raquel Bullock, MS, RD, LDN  10/20/2023  12:06 PM

## 2023-10-24 ENCOUNTER — PATIENT MESSAGE (OUTPATIENT)
Dept: PSYCHIATRY | Facility: CLINIC | Age: 53
End: 2023-10-24
Payer: MEDICAID

## 2023-10-25 DIAGNOSIS — G47.00 INSOMNIA, UNSPECIFIED TYPE: ICD-10-CM

## 2023-10-25 RX ORDER — MIRTAZAPINE 7.5 MG/1
7.5 TABLET, FILM COATED ORAL NIGHTLY
Qty: 30 TABLET | Refills: 0 | Status: SHIPPED | OUTPATIENT
Start: 2023-10-25 | End: 2024-03-21

## 2023-10-30 ENCOUNTER — OFFICE VISIT (OUTPATIENT)
Dept: ENDOCRINOLOGY | Facility: CLINIC | Age: 53
End: 2023-10-30
Payer: MEDICAID

## 2023-10-30 VITALS
HEIGHT: 66 IN | HEART RATE: 95 BPM | BODY MASS INDEX: 45.35 KG/M2 | SYSTOLIC BLOOD PRESSURE: 118 MMHG | DIASTOLIC BLOOD PRESSURE: 72 MMHG | OXYGEN SATURATION: 97 % | WEIGHT: 282.19 LBS

## 2023-10-30 DIAGNOSIS — E03.8 OTHER SPECIFIED HYPOTHYROIDISM: ICD-10-CM

## 2023-10-30 DIAGNOSIS — E83.52 HYPERCALCEMIA: Primary | ICD-10-CM

## 2023-10-30 DIAGNOSIS — E04.2 MULTINODULAR GOITER: ICD-10-CM

## 2023-10-30 DIAGNOSIS — R53.83 FATIGUE, UNSPECIFIED TYPE: ICD-10-CM

## 2023-10-30 PROCEDURE — 99214 PR OFFICE/OUTPT VISIT, EST, LEVL IV, 30-39 MIN: ICD-10-PCS | Mod: S$PBB,,, | Performed by: INTERNAL MEDICINE

## 2023-10-30 PROCEDURE — 3078F PR MOST RECENT DIASTOLIC BLOOD PRESSURE < 80 MM HG: ICD-10-PCS | Mod: CPTII,,, | Performed by: INTERNAL MEDICINE

## 2023-10-30 PROCEDURE — 3008F BODY MASS INDEX DOCD: CPT | Mod: CPTII,,, | Performed by: INTERNAL MEDICINE

## 2023-10-30 PROCEDURE — 4010F ACE/ARB THERAPY RXD/TAKEN: CPT | Mod: CPTII,,, | Performed by: INTERNAL MEDICINE

## 2023-10-30 PROCEDURE — 3008F PR BODY MASS INDEX (BMI) DOCUMENTED: ICD-10-PCS | Mod: CPTII,,, | Performed by: INTERNAL MEDICINE

## 2023-10-30 PROCEDURE — 1159F PR MEDICATION LIST DOCUMENTED IN MEDICAL RECORD: ICD-10-PCS | Mod: CPTII,,, | Performed by: INTERNAL MEDICINE

## 2023-10-30 PROCEDURE — 99999 PR PBB SHADOW E&M-EST. PATIENT-LVL V: CPT | Mod: PBBFAC,,, | Performed by: INTERNAL MEDICINE

## 2023-10-30 PROCEDURE — 1160F RVW MEDS BY RX/DR IN RCRD: CPT | Mod: CPTII,,, | Performed by: INTERNAL MEDICINE

## 2023-10-30 PROCEDURE — 99215 OFFICE O/P EST HI 40 MIN: CPT | Mod: PBBFAC,PO | Performed by: INTERNAL MEDICINE

## 2023-10-30 PROCEDURE — 3078F DIAST BP <80 MM HG: CPT | Mod: CPTII,,, | Performed by: INTERNAL MEDICINE

## 2023-10-30 PROCEDURE — 1159F MED LIST DOCD IN RCRD: CPT | Mod: CPTII,,, | Performed by: INTERNAL MEDICINE

## 2023-10-30 PROCEDURE — 1160F PR REVIEW ALL MEDS BY PRESCRIBER/CLIN PHARMACIST DOCUMENTED: ICD-10-PCS | Mod: CPTII,,, | Performed by: INTERNAL MEDICINE

## 2023-10-30 PROCEDURE — 99999 PR PBB SHADOW E&M-EST. PATIENT-LVL V: ICD-10-PCS | Mod: PBBFAC,,, | Performed by: INTERNAL MEDICINE

## 2023-10-30 PROCEDURE — 3074F SYST BP LT 130 MM HG: CPT | Mod: CPTII,,, | Performed by: INTERNAL MEDICINE

## 2023-10-30 PROCEDURE — 4010F PR ACE/ARB THEARPY RXD/TAKEN: ICD-10-PCS | Mod: CPTII,,, | Performed by: INTERNAL MEDICINE

## 2023-10-30 PROCEDURE — 99214 OFFICE O/P EST MOD 30 MIN: CPT | Mod: S$PBB,,, | Performed by: INTERNAL MEDICINE

## 2023-10-30 PROCEDURE — 3074F PR MOST RECENT SYSTOLIC BLOOD PRESSURE < 130 MM HG: ICD-10-PCS | Mod: CPTII,,, | Performed by: INTERNAL MEDICINE

## 2023-10-30 RX ORDER — ERGOCALCIFEROL 1.25 MG/1
50000 CAPSULE ORAL
Qty: 4 CAPSULE | Refills: 2 | Status: SHIPPED | OUTPATIENT
Start: 2023-10-30 | End: 2024-01-27 | Stop reason: CLARIF

## 2023-10-30 NOTE — PROGRESS NOTES
CHIEF COMPLAINT: Hypothyroidism  53 y.o.  being seen as a f/u. She has a history of Graves' disease which has been treated. Subsequently developed hypothyroidism.     On synthroid 250 (125 mcg tablet x 2) daily. Has been on this dose x 4 weeks.  No XRT to head/neck. No difficulty swallowing. Had a PET CT showing increased metabolism on left thyroid nodule. No Palpitations. No tremors. No increased anxiety.     In interim has had colon cancer. Had surgery done in 2022 that showed LN + for Ca. On Opdivo and Erbitux.       Has had consistent hypercalcemia since approx 2023. Prior to that had occasional episodes.   Has had kidney stones with last passed stone 2019. No thiazide or lithium. No Tums. Not currently taking Vit D. No N/V. No fractures. She is snoring. + fatigue           Left Thyroid FNA 12- non diagnostic.             PAST MEDICAL HISTORY: Hypothyroidism after treatment for Graves' disease. 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 There is heart disease in the family     MEDICATIONS/ALLERGIES: The patient's MedCard has been updated and reviewed.     PE:   GENERAL: Well developed, well nourished.  NECK: Supple, trachea midline, no thyroid nodules  CHEST: Resp even and unlabored, CTA bilateral.  CARDIAC: RRR, S1, S2 heard, no murmurs, rubs, S3, or S4            Latest Reference Range & Units 10/18/23 13:03   Sodium 136 - 145 mmol/L 138   Potassium 3.5 - 5.1 mmol/L 4.4   Chloride 95 - 110 mmol/L 108   CO2 23 - 29 mmol/L 20 (L)   Anion Gap 8 - 16 mmol/L 10   BUN 6 - 20 mg/dL 8   Creatinine 0.5 - 1.4 mg/dL 0.9   eGFR >60 mL/min/1.73 m^2 >60.0   Glucose 70 - 110 mg/dL 101   Calcium 8.7 - 10.5 mg/dL 11.0 (H)   Magnesium  1.6 - 2.6 mg/dL 2.1   ALP 55 - 135 U/L 110   PROTEIN TOTAL 6.0 - 8.4 g/dL 7.4   Albumin 3.5 - 5.2 g/dL 3.8   (L): Data is abnormally low  (H): Data is abnormally  high   Latest Reference Range & Units 10/04/23 13:45   Vit D, 25-Hydroxy 30 - 96 ng/mL 18 (L)   (L): Data is abnormally low     Latest Reference Range & Units 09/28/23 14:03 10/04/23 13:45   TSH 0.400 - 4.000 uIU/mL 0.525    PTH 9.0 - 77.0 pg/mL  127.2 (H)   (H): Data is abnormally high    US SOFT TISSUE HEAD NECK THYROID     CLINICAL HISTORY:  Nontoxic multinodular goiter     TECHNIQUE:  Ultrasound of the thyroid and cervical lymph nodes was performed.     COMPARISON:  Thyroid ultrasound-09/12/2022     FINDINGS:  The right thyroid lobe measures 3.6 x 1.2 x 1.0 cm in the left thyroid lobe measures 3 x 1.1 x 1.1 cm.  The total thyroid weight is 3.9 g.     There is an unchanged 9 x 8 x 8 mm smooth, circumscribed, wider than tall, primarily hypoechoic solid nodule with central hyperechogenicity observed in the left thyroid midpole, similar to the prior examination.  There is a 7 x 7 x 7 mm peripherally calcified nodule observed in the right thyroid midpole, unchanged.  There are no new thyroid nodules which meet the criteria for FNA/core biopsy.  There is a diffusely heterogeneous thyroid parenchymal echotexture present.  No new pathologically enlarged lymph nodes in the left or right neck adjacent to the thyroid fossa.     Impression:     Small heterogeneous thyroid gland with no new thyroid nodules which meet the criteria for FNA/core biopsy.  No interval detrimental change when compared to the prior study.  No new concerning lymphadenopathy in the neck.    ASSESSMENT/PLAN:  1. Hypothyroidism- .  There was some confusion about her dosage of medication.  Now taking Synthroid 250 mcg daily.  Appears to be taking appropriately.  Has been on it for 4 weeks.  Check TSH.      2. Multinodular Goiter- No XRT.  Recently had a PET-CT showing a hypermetabolic left thyroid nodule.  Max SUV 13.6.  She did have a biopsy in 2012, however was nondiagnostic.  Reviewed ultrasound imaging dating back to 2013 and is not changed in size  in 10 years.  Discussed with the PET scan showing possible hypermetabolic nodule, it would be best FNA.  Due to colon cancer this was put to the side.  Can repeat thyroid ultrasound    3. Morbid Obesity- exercise limited.  Currently getting chemotherapy.    4. Hypercalcemia- will need workup for primary hyperparathyroidism.  Discussed diagnosis made based on labs and not imaging.  Depending on workup, may need bone density.  If she does have primary hyperparathyroidism based on labs, we will need to assess for surgical indications.  We can do imaging if surgery is the next step for surgical guidance    5. Vit D Def- take Ergocalciferol 50,000 units weekly x 3 months and then take Over the counter Vit D 2000 IU daily.     6.  Fatigue-she does snore.  Refer to sleep medicine for sleep apnea testing     FOLLOWUP  Sleep Medicine  Needs Renal Panel, PTH, ionized Ca, TSH, 24 hr urine ca/cr

## 2023-10-31 ENCOUNTER — OFFICE VISIT (OUTPATIENT)
Dept: PSYCHIATRY | Facility: CLINIC | Age: 53
End: 2023-10-31
Payer: MEDICAID

## 2023-10-31 DIAGNOSIS — F43.29 ADJUSTMENT DISORDER WITH OTHER SYMPTOM: Primary | ICD-10-CM

## 2023-10-31 PROCEDURE — 4010F PR ACE/ARB THEARPY RXD/TAKEN: ICD-10-PCS | Mod: CPTII,,,

## 2023-10-31 PROCEDURE — 90834 PSYTX W PT 45 MINUTES: CPT | Mod: AH,HB,,

## 2023-10-31 PROCEDURE — 4010F ACE/ARB THERAPY RXD/TAKEN: CPT | Mod: CPTII,,,

## 2023-10-31 PROCEDURE — 90834 PR PSYCHOTHERAPY W/PATIENT, 45 MIN: ICD-10-PCS | Mod: AH,HB,,

## 2023-10-31 NOTE — PROGRESS NOTES
PSYCHO-ONCOLOGY NOTE/ Individual Psychotherapy     Date: 10/31/2023   Site:  [facility]      Therapeutic Intervention: Met with patient.  Outpatient - Behavior modifying psychotherapy 45 min - CPT code 70561 and Outpatient - Supportive psychotherapy 45 min - CPT Code 45054    This includes face to face time and non-face to face time preparing to see the patient, obtaining and/or reviewing separately obtained history, documenting clinical information in the electronic or other health record, independently interpreting results and communicating results to the patient/family/caregiver, or care coordinator.      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    Insomnia    Insomnia    Other constipation    Nausea and vomiting    Mucositis due to chemotherapy    Morbid obesity    Secondary adenocarcinoma of lymph node    Thyroid nodule    Hypercalcemia    Transaminitis    Hypophosphatemia    Functional diarrhea    Primary hypertension    Diarrhea of presumed infectious origin    Calculus of gallbladder without cholecystitis without obstruction    ACP (advance care planning)    Abdominal pain    Shortness of breath    Secondary malignant neoplasm of retroperitoneum    Depressive disorder    Hypothyroidism       Chief complaint/reason for encounter: sleep and interpersonal   Met with patient to evaluate psychosocial adaptation to diagnosis/treatment/survivorship of colon cancer    Current Medications  Current Outpatient Medications   Medication    acetaminophen (TYLENOL) 500 MG tablet    buPROPion (WELLBUTRIN SR) 150 MG TBSR 12 hr tablet    clindamycin phosphate 1% (CLINDAGEL) 1  % gel    doxycycline (VIBRA-TABS) 100 MG tablet    DULoxetine (CYMBALTA) 30 MG capsule    encorafenib 75 mg Cap    ergocalciferol (ERGOCALCIFEROL) 50,000 unit Cap    granisetron (SANCUSO) 3.1 mg/24 hour    Lactobacillus rhamnosus GG (CULTURELLE) 10 billion cell capsule    levothyroxine (SYNTHROID) 125 MCG tablet    LIDOcaine-prilocaine (EMLA) cream    LORazepam (ATIVAN) 1 MG tablet    magic mouthwash diphen/antac/lidoc/nysta    mirtazapine (REMERON) 7.5 MG Tab    multivitamin (THERAGRAN) per tablet    olmesartan (BENICAR) 20 MG tablet    ondansetron (ZOFRAN-ODT) 8 MG TbDL    oxyCODONE-acetaminophen (PERCOCET)  mg per tablet    prochlorperazine (COMPAZINE) 10 MG tablet    senna-docusate 8.6-50 mg (PERICOLACE) 8.6-50 mg per tablet     No current facility-administered medications for this visit.       ONCOLOGY HISTORY  Oncology History   Malignant neoplasm of sigmoid colon   3/16/2020 Initial Diagnosis    Malignant neoplasm of sigmoid colon     3/31/2020 Cancer Staged    Staging form: Colon and Rectum, AJCC 8th Edition  - Clinical stage from 3/31/2020: Stage IIIC (cT4b, cN2a, cM0)     5/6/2020 - 11/17/2020 Chemotherapy    Treatment Summary   Plan Name: OP FOLFOX 6 Q2W  Treatment Goal: Curative  Status: Inactive  Start Date: 5/6/2020  End Date: 11/6/2020  Provider: Dylan Leyva MD  Chemotherapy: fluorouraciL injection 945 mg, 400 mg/m2 = 945 mg, Intravenous, [facility] 1 time, 14 of 14 cycles  Administration: 945 mg (5/6/2020), 945 mg (5/20/2020), 945 mg (6/3/2020), 945 mg (6/17/2020), 945 mg (7/29/2020), 945 mg (8/12/2020), 945 mg (8/26/2020), 945 mg (9/9/2020), 945 mg (9/23/2020), 945 mg (10/6/2020), 945 mg (10/21/2020), 945 mg (11/4/2020)  fluorouraciL 2,400 mg/m2 = 5,665 mg in sodium chloride 0.9% 240 mL chemo infusion, 2,400 mg/m2 = 5,665 mg, Intravenous, Over 46 hours, 14 of 14 cycles  Administration: 5,665 mg (5/6/2020), 5,665 mg (5/20/2020), 5,665 mg (6/3/2020), 5,665 mg (6/17/2020), 5,665 mg  (7/29/2020), 5,665 mg (8/12/2020), 5,665 mg (8/26/2020), 5,665 mg (9/9/2020), 5,665 mg (9/23/2020), 5,665 mg (10/6/2020), 5,665 mg (10/21/2020), 5,665 mg (11/4/2020)  leucovorin calcium 900 mg in dextrose 5 % 250 mL infusion, 945 mg, Intravenous, [facility] 1 time, 14 of 14 cycles  Administration: 900 mg (5/6/2020), 900 mg (5/20/2020), 900 mg (6/3/2020), 900 mg (6/17/2020), 945 mg (6/30/2020), 945 mg (7/20/2020), 945 mg (7/29/2020), 945 mg (8/12/2020), 945 mg (8/26/2020), 945 mg (9/9/2020), 945 mg (9/23/2020), 945 mg (10/6/2020), 945 mg (10/21/2020), 945 mg (11/4/2020)  oxaliplatin (ELOXATIN) 200 mg in dextrose 5 % 500 mL chemo infusion, 201 mg, Intravenous, [facility] 1 time, 6 of 6 cycles  Dose modification: 65 mg/m2 (original dose 85 mg/m2, Cycle 6)  Administration: 200 mg (5/6/2020), 200 mg (5/20/2020), 200 mg (6/3/2020), 200 mg (6/17/2020), 201 mg (6/30/2020), 150 mg (7/20/2020)     5/3/2021 Cancer Staged    Staging form: Colon and Rectum, AJCC 8th Edition  - Clinical stage from 5/3/2021: Stage Unknown (rcT0, cNX, cM1)     6/16/2021 - 8/2/2023 Chemotherapy    Treatment Summary   Plan Name: OP COLORECTAL FOLFIRI + BEVACIZUMAB Q2W  Treatment Goal: Control  Status: Inactive  Start Date: 6/16/2021  End Date: 8/2/2023  Provider: Marah Santo MD  Chemotherapy: fluorouraciL injection 990 mg, 400 mg/m2 = 990 mg, Intravenous, [facility] 1 time, 15 of 15 cycles  Administration: 990 mg (6/16/2021), 990 mg (6/30/2021), 990 mg (7/14/2021), 980 mg (7/28/2021), 990 mg (8/11/2021), 990 mg (8/25/2021), 990 mg (9/8/2021), 990 mg (9/22/2021), 990 mg (10/6/2021), 990 mg (10/20/2021), 990 mg (11/3/2021), 990 mg (12/1/2021), 990 mg (12/15/2021), 990 mg (11/17/2021), 990 mg (12/28/2021)  fluorouraciL 2,400 mg/m2 = 5,950 mg in sodium chloride 0.9% 240 mL chemo infusion, 2,400 mg/m2 = 5,950 mg, Intravenous, Over 46 hours, 43 of 46 cycles  Administration: 5,950 mg (6/16/2021), 5,950 mg (6/30/2021), 5,950 mg (7/14/2021), 5,880 mg  (7/28/2021), 5,930 mg (8/11/2021), 5,930 mg (8/25/2021), 5,930 mg (9/8/2021), 5,930 mg (9/22/2021), 5,930 mg (10/6/2021), 5,930 mg (10/20/2021), 5,930 mg (11/3/2021), 5,930 mg (12/1/2021), 5,930 mg (12/15/2021), 5,930 mg (11/17/2021), 5,930 mg (12/28/2021), 6,050 mg (5/11/2022), 6,025 mg (3/9/2022), 6,025 mg (2/23/2022), 5,930 mg (2/2/2022), 5,930 mg (1/19/2022), 6,050 mg (4/20/2022), 6,025 mg (4/6/2022), 6,025 mg (3/23/2022), 6,050 mg (6/1/2022), 6,050 mg (6/15/2022), 6,050 mg (10/19/2022), 6,050 mg (10/5/2022), 6,050 mg (11/2/2022), 6,050 mg (11/16/2022), 6,050 mg (11/30/2022), 6,050 mg (1/4/2023), 6,050 mg (12/19/2022), 6,050 mg (1/18/2023), 6,000 mg (5/22/2023), 6,000 mg (4/26/2023), 6,000 mg (4/11/2023), 6,000 mg (2/28/2023), 6,050 mg (2/14/2023), 6,000 mg (2/1/2023), 6,000 mg (7/5/2023), 6,000 mg (6/19/2023), 6,000 mg (6/5/2023), 6,000 mg (7/31/2023)  bevacizumab (AVASTIN) 600 mg in sodium chloride 0.9% 100 mL chemo infusion, 660 mg, Intravenous, [facility] 1 time, 36 of 36 cycles  Dose modification: 5 mg/kg (original dose 5 mg/kg, Cycle 26, Reason: MD Discretion, Comment: 5 mg/kg original dose)  Administration: 600 mg (6/30/2021), 600 mg (7/14/2021), 600 mg (7/28/2021), 600 mg (6/16/2021), 600 mg (8/11/2021), 655 mg (8/25/2021), 600 mg (9/8/2021), 600 mg (9/22/2021), 600 mg (10/6/2021), 600 mg (10/20/2021), 600 mg (11/3/2021), 655 mg (12/1/2021), 600 mg (12/15/2021), 600 mg (11/17/2021), 600 mg (12/28/2021), 600 mg (5/11/2022), 600 mg (3/9/2022), 600 mg (2/23/2022), 600 mg (2/2/2022), 600 mg (1/19/2022), 600 mg (4/20/2022), 680 mg (4/6/2022), 600 mg (3/23/2022), 680 mg (10/5/2022), 680 mg (10/19/2022), 680 mg (11/2/2022), 680 mg (11/16/2022), 680 mg (11/30/2022), 680 mg (1/4/2023), 680 mg (12/19/2022), 680 mg (1/18/2023), 680 mg (3/28/2023), 680 mg (3/14/2023), 680 mg (2/28/2023), 680 mg (2/14/2023), 680 mg (2/1/2023)  irinotecan (CAMPTOSAR) 440 mg in sodium chloride 0.9% 500 mL chemo infusion, 446 mg,  Intravenous, Clinic/[facility] 1 time, 45 of 48 cycles  Dose modification: 180 mg/m2 (original dose 180 mg/m2, Cycle 24)  Administration: 440 mg (6/16/2021), 440 mg (6/30/2021), 440 mg (7/14/2021), 440 mg (7/28/2021), 440 mg (8/11/2021), 444 mg (8/25/2021), 440 mg (9/8/2021), 440 mg (9/22/2021), 440 mg (10/6/2021), 440 mg (10/20/2021), 440 mg (11/3/2021), 440 mg (12/1/2021), 440 mg (12/15/2021), 440 mg (11/17/2021), 440 mg (12/28/2021), 440 mg (5/11/2022), 440 mg (3/9/2022), 440 mg (2/23/2022), 440 mg (2/2/2022), 440 mg (1/19/2022), 440 mg (4/20/2022), 440 mg (4/6/2022), 440 mg (3/24/2022), 440 mg (6/1/2022), 440 mg (6/15/2022), 440 mg (10/19/2022), 440 mg (10/5/2022), 440 mg (11/2/2022), 440 mg (11/16/2022), 440 mg (11/30/2022), 440 mg (1/4/2023), 440 mg (12/19/2022), 440 mg (1/18/2023), 440 mg (5/22/2023), 440 mg (4/26/2023), 440 mg (4/11/2023), 440 mg (3/28/2023), 440 mg (3/14/2023), 440 mg (2/28/2023), 440 mg (2/14/2023), 440 mg (2/1/2023), 440 mg (7/5/2023), 440 mg (6/19/2023), 440 mg (6/5/2023), 440 mg (7/31/2023)  bevacizumab-awwb (MVASI) 5 mg/kg = 680 mg in sodium chloride 0.9% 100 mL infusion, 5 mg/kg = 680 mg (100 % of original dose 5 mg/kg), Intravenous, [facility] 1 time, 7 of 10 cycles  Dose modification: 5 mg/kg (original dose 5 mg/kg, Cycle 39)  Administration: 680 mg (4/11/2023), 680 mg (4/26/2023), 680 mg (5/22/2023), 680 mg (7/5/2023), 680 mg (6/19/2023), 680 mg (6/5/2023), 680 mg (7/31/2023)     8/17/2023 - 8/18/2023 Chemotherapy    Treatment Summary   Plan Name: OP CETUXIMAB 500MG Q2W  Treatment Goal: Control  Status: Inactive  Start Date:   End Date:   Provider: Marah Santo MD  Chemotherapy: [No matching medication found in this treatment plan]     8/24/2023 -  Chemotherapy    Treatment Summary   Plan Name: [facility] - ARM 1 ENCORAFENIB  CETUXIMAB NIVOLUMAB  Treatment Goal: Palliative  Status: Active  Start Date: 8/24/2023  End Date: 7/12/2024 (Planned)  Provider: Marah Santo MD  Chemotherapy:  cetuximab (ERBITUX) 100 mg/50 mL chemo infusion 1,200 mg, 1,230 mg, Intravenous, [facility] 1 time, 3 of 12 cycles  Administration: 1,200 mg (8/24/2023), 1,200 mg (9/8/2023), 1,200 mg (9/22/2023), 1,200 mg (10/6/2023), 1,200 mg (10/20/2023)  encorafenib 75 mg Cap, 300 mg, Oral, Daily, 1 of 1 cycle, Start date: --, End date: --     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   4/3/2020 Initial Diagnosis    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     5/6/2020 - 11/17/2020 Chemotherapy    Treatment Summary   Plan Name: OP FOLFOX 6 Q2W  Treatment Goal: Curative  Status: Inactive  Start Date: 5/6/2020  End Date: 11/6/2020  Provider: Dylan Leyva MD  Chemotherapy: fluorouraciL injection 945 mg, 400 mg/m2 = 945 mg, Intravenous, [facility] 1 time, 14 of 14 cycles  Administration: 945 mg (5/6/2020), 945 mg (5/20/2020), 945 mg (6/3/2020), 945 mg (6/17/2020), 945 mg (7/29/2020), 945 mg (8/12/2020), 945 mg (8/26/2020), 945 mg (9/9/2020), 945 mg (9/23/2020), 945 mg (10/6/2020), 945 mg (10/21/2020), 945 mg (11/4/2020)  fluorouraciL 2,400 mg/m2 = 5,665 mg in sodium chloride 0.9% 240 mL chemo infusion, 2,400 mg/m2 = 5,665 mg, Intravenous, Over 46 hours, 14 of 14 cycles  Administration: 5,665 mg (5/6/2020), 5,665 mg (5/20/2020), 5,665 mg (6/3/2020), 5,665 mg (6/17/2020), 5,665 mg (7/29/2020), 5,665 mg (8/12/2020), 5,665 mg (8/26/2020), 5,665 mg (9/9/2020), 5,665 mg (9/23/2020), 5,665 mg (10/6/2020), 5,665 mg (10/21/2020), 5,665 mg (11/4/2020)  leucovorin calcium 900 mg in dextrose 5 % 250 mL infusion, 945 mg, Intravenous, [facility] 1 time, 14 of 14 cycles  Administration: 900 mg (5/6/2020), 900 mg (5/20/2020), 900 mg (6/3/2020), 900 mg (6/17/2020), 945 mg (6/30/2020), 945 mg (7/20/2020), 945 mg (7/29/2020), 945 mg (8/12/2020), 945 mg (8/26/2020), 945 mg (9/9/2020), 945 mg (9/23/2020), 945 mg (10/6/2020), 945 mg (10/21/2020), 945 mg (11/4/2020)  oxaliplatin (ELOXATIN) 200 mg in dextrose 5 % 500 mL chemo infusion, 201 mg, Intravenous,  Cl[facility] 1 time, 6 of 6 cycles  Dose modification: 65 mg/m2 (original dose 85 mg/m2, Cycle 6)  Administration: 200 mg (5/6/2020), 200 mg (5/20/2020), 200 mg (6/3/2020), 200 mg (6/17/2020), 201 mg (6/30/2020), 150 mg (7/20/2020)     6/16/2021 - 8/2/2023 Chemotherapy    Treatment Summary   Plan Name: OP COLORECTAL FOLFIRI + BEVACIZUMAB Q2W  Treatment Goal: Control  Status: Inactive  Start Date: 6/16/2021  End Date: 8/2/2023  Provider: Marah Satno MD  Chemotherapy: fluorouraciL injection 990 mg, 400 mg/m2 = 990 mg, Intravenous, [facility] 1 time, 15 of 15 cycles  Administration: 990 mg (6/16/2021), 990 mg (6/30/2021), 990 mg (7/14/2021), 980 mg (7/28/2021), 990 mg (8/11/2021), 990 mg (8/25/2021), 990 mg (9/8/2021), 990 mg (9/22/2021), 990 mg (10/6/2021), 990 mg (10/20/2021), 990 mg (11/3/2021), 990 mg (12/1/2021), 990 mg (12/15/2021), 990 mg (11/17/2021), 990 mg (12/28/2021)  fluorouraciL 2,400 mg/m2 = 5,950 mg in sodium chloride 0.9% 240 mL chemo infusion, 2,400 mg/m2 = 5,950 mg, Intravenous, Over 46 hours, 43 of 46 cycles  Administration: 5,950 mg (6/16/2021), 5,950 mg (6/30/2021), 5,950 mg (7/14/2021), 5,880 mg (7/28/2021), 5,930 mg (8/11/2021), 5,930 mg (8/25/2021), 5,930 mg (9/8/2021), 5,930 mg (9/22/2021), 5,930 mg (10/6/2021), 5,930 mg (10/20/2021), 5,930 mg (11/3/2021), 5,930 mg (12/1/2021), 5,930 mg (12/15/2021), 5,930 mg (11/17/2021), 5,930 mg (12/28/2021), 6,050 mg (5/11/2022), 6,025 mg (3/9/2022), 6,025 mg (2/23/2022), 5,930 mg (2/2/2022), 5,930 mg (1/19/2022), 6,050 mg (4/20/2022), 6,025 mg (4/6/2022), 6,025 mg (3/23/2022), 6,050 mg (6/1/2022), 6,050 mg (6/15/2022), 6,050 mg (10/19/2022), 6,050 mg (10/5/2022), 6,050 mg (11/2/2022), 6,050 mg (11/16/2022), 6,050 mg (11/30/2022), 6,050 mg (1/4/2023), 6,050 mg (12/19/2022), 6,050 mg (1/18/2023), 6,000 mg (5/22/2023), 6,000 mg (4/26/2023), 6,000 mg (4/11/2023), 6,000 mg (2/28/2023), 6,050 mg (2/14/2023), 6,000 mg (2/1/2023), 6,000 mg (7/5/2023), 6,000 mg  (6/19/2023), 6,000 mg (6/5/2023), 6,000 mg (7/31/2023)  bevacizumab (AVASTIN) 600 mg in sodium chloride 0.9% 100 mL chemo infusion, 660 mg, Intravenous, [facility] 1 time, 36 of 36 cycles  Dose modification: 5 mg/kg (original dose 5 mg/kg, Cycle 26, Reason: MD Discretion, Comment: 5 mg/kg original dose)  Administration: 600 mg (6/30/2021), 600 mg (7/14/2021), 600 mg (7/28/2021), 600 mg (6/16/2021), 600 mg (8/11/2021), 655 mg (8/25/2021), 600 mg (9/8/2021), 600 mg (9/22/2021), 600 mg (10/6/2021), 600 mg (10/20/2021), 600 mg (11/3/2021), 655 mg (12/1/2021), 600 mg (12/15/2021), 600 mg (11/17/2021), 600 mg (12/28/2021), 600 mg (5/11/2022), 600 mg (3/9/2022), 600 mg (2/23/2022), 600 mg (2/2/2022), 600 mg (1/19/2022), 600 mg (4/20/2022), 680 mg (4/6/2022), 600 mg (3/23/2022), 680 mg (10/5/2022), 680 mg (10/19/2022), 680 mg (11/2/2022), 680 mg (11/16/2022), 680 mg (11/30/2022), 680 mg (1/4/2023), 680 mg (12/19/2022), 680 mg (1/18/2023), 680 mg (3/28/2023), 680 mg (3/14/2023), 680 mg (2/28/2023), 680 mg (2/14/2023), 680 mg (2/1/2023)  irinotecan (CAMPTOSAR) 440 mg in sodium chloride 0.9% 500 mL chemo infusion, 446 mg, Intravenous, [facility] 1 time, 45 of 48 cycles  Dose modification: 180 mg/m2 (original dose 180 mg/m2, Cycle 24)  Administration: 440 mg (6/16/2021), 440 mg (6/30/2021), 440 mg (7/14/2021), 440 mg (7/28/2021), 440 mg (8/11/2021), 444 mg (8/25/2021), 440 mg (9/8/2021), 440 mg (9/22/2021), 440 mg (10/6/2021), 440 mg (10/20/2021), 440 mg (11/3/2021), 440 mg (12/1/2021), 440 mg (12/15/2021), 440 mg (11/17/2021), 440 mg (12/28/2021), 440 mg (5/11/2022), 440 mg (3/9/2022), 440 mg (2/23/2022), 440 mg (2/2/2022), 440 mg (1/19/2022), 440 mg (4/20/2022), 440 mg (4/6/2022), 440 mg (3/24/2022), 440 mg (6/1/2022), 440 mg (6/15/2022), 440 mg (10/19/2022), 440 mg (10/5/2022), 440 mg (11/2/2022), 440 mg (11/16/2022), 440 mg (11/30/2022), 440 mg (1/4/2023), 440 mg (12/19/2022), 440 mg (1/18/2023), 440 mg (5/22/2023), 440 mg  (4/26/2023), 440 mg (4/11/2023), 440 mg (3/28/2023), 440 mg (3/14/2023), 440 mg (2/28/2023), 440 mg (2/14/2023), 440 mg (2/1/2023), 440 mg (7/5/2023), 440 mg (6/19/2023), 440 mg (6/5/2023), 440 mg (7/31/2023)  bevacizumab-awwb (MVASI) 5 mg/kg = 680 mg in sodium chloride 0.9% 100 mL infusion, 5 mg/kg = 680 mg (100 % of original dose 5 mg/kg), Intravenous, Clinic/[facility] 1 time, 7 of 10 cycles  Dose modification: 5 mg/kg (original dose 5 mg/kg, Cycle 39)  Administration: 680 mg (4/11/2023), 680 mg (4/26/2023), 680 mg (5/22/2023), 680 mg (7/5/2023), 680 mg (6/19/2023), 680 mg (6/5/2023), 680 mg (7/31/2023)     8/17/2023 - 8/18/2023 Chemotherapy    Treatment Summary   Plan Name: OP CETUXIMAB 500MG Q2W  Treatment Goal: Control  Status: Inactive  Start Date:   End Date:   Provider: Marah Santo MD  Chemotherapy: [No matching medication found in this treatment plan]     8/24/2023 -  Chemotherapy    Treatment Summary   Plan Name: [facility] - ARM 1 ENCORAFENIB  CETUXIMAB NIVOLUMAB  Treatment Goal: Palliative  Status: Active  Start Date: 8/24/2023  End Date: 7/12/2024 (Planned)  Provider: Marah Santo MD  Chemotherapy: cetuximab (ERBITUX) 100 mg/50 mL chemo infusion 1,200 mg, 1,230 mg, Intravenous, Clinic/[facility] 1 time, 3 of 12 cycles  Administration: 1,200 mg (8/24/2023), 1,200 mg (9/8/2023), 1,200 mg (9/22/2023), 1,200 mg (10/6/2023), 1,200 mg (10/20/2023)  encorafenib 75 mg Cap, 300 mg, Oral, Daily, 1 of 1 cycle, Start date: --, End date: --         Objective:  Gail Mckinnon arrived promptly for the session.  Ms. Mckinnon was independently ambulatory at the time of session. The patient was fully cooperative throughout the session.  Appearance: age appropriate, appropriately  dressed, adequately  groomed  Behavior/Cooperation: friendly and cooperative  Speech: normal in rate, volume, and tone  Mood: euthymic  Affect: euthymic  Thought Process: goal-directed, logical  Thought Content: normal,  No delusions or  paranoia; did not appear to be responding to internal stimuli during the session  Orientation: grossly intact  Memory: grossly intact  Attention Span/Concentration: Attends to session without distraction; reports no difficulty  Fund of Knowledge: average  Estimate of Intelligence: average from verbal skills and history  Cognition: grossly intact  Insight: patient has awareness of illness; good insight into own behavior and behavior of others  Judgment: the patient's behavior is adequate to circumstances    NCCN Distress thermometer:       10/29/2023    12:49 PM 10/18/2023     9:46 AM 10/3/2023     3:43 PM 9/14/2023     4:44 PM 9/7/2023     1:00 PM 9/6/2023     9:26 AM 8/21/2023     4:58 PM   DISTRESS SCREENING   Distress Score 4 4    4 4 5 5 5    5 6    6   Practical Problems Insurance/Financial Insurance/Financial    Insurance/Financial Insurance/Financial Insurance/Financial Insurance/Financial Insurance/Financial;Work/School    Insurance/Financial;Work/School Insurance/Financial;Treatment Decisions    Insurance/Financial;Treatment Decisions   Family Problems Dealing with Children;Family Health Issues Dealing with Children    Dealing with Children Dealing with Children;Family Health Issues Dealing with Children Dealing with Children Dealing with Children;Family Health Issues    Dealing with Children;Family Health Issues Dealing with Children    Dealing with Children   Emotional Problems Depression;Fears;Sadness;Worry Depression;Worry    Depression;Worry Depression;Sadness;Worry Depression;Sadness;Worry Depression;Fears;Worry Depression;Fears;Sadness;Worry    Depression;Fears;Sadness;Worry Depression;Fears;Sadness;Worry    Depression;Fears;Sadness;Worry   Spiritual / Mu-ism Concerns No No    No No No No No    No No    No   Physical Problems Memory/Concentration;Skin Dry/Itchy;Sleep Skin Dry/Itchy;Sleep    Skin Dry/Itchy;Sleep Fatigue;Sleep Fatigue Sleep Appearance;Fatigue;Skin Dry/Itchy     Appearance;Fatigue;Skin Dry/Itchy Fatigue;Sleep;Tingling in hands or feet    Fatigue;Sleep;Tingling in hands or feet        Interval history and content of current session: Discussed diagnosis and treatment. Reports to be coping in an adequate manner. Identified and evaluated psychosocial and environmental stressors secondary to diagnosis and treatment. Gail Mckinnon is feeling less stressed financially and is getting along well with her children. She has engaged in appropriate coping skills when she becomes overwhelmed with her daughter. Gail Mckinnon discussed her relationship with potential romantic partners. She was encouraged to examine the facts regarding her communication style to reduce her feelings of anxiety. She also noted difficulties with sleep due to anxious thoughts. She was introduced to a grounding technique, a free delgado with sleep soundscapes and to a sleep hygiene skill (getting out of bed when she cannot fall asleep).      Risk parameters:   Patient reports no suicidal ideation  Patient reports no homicidal ideation  Patient reports no self-injurious behavior  Patient reports no violent behavior   Safety needs:  None at this time      Verbal deficits: None     Patient's response to intervention:The patient's response to intervention is accepting.     Progress toward goals and other mental status changes:  The patient's progress toward goals is good.      Progress to date:Progress as Expected      Goals from last visit:  Since this was her first therapy session, she did not have a previous goal        Patient Strengths: verbal, intelligent, good insight, commitment to wellness, strong miah, good social support system      Treatment Plan:individual psychotherapy  Target symptoms:  sleep, anxiety  Why chosen therapy is appropriate versus another modality: evidence based practice  Outcome monitoring methods: self-report  Therapeutic intervention type: behavior modifying psychotherapy,  supportive psychotherapy  Prognosis: Good      Behavioral goals:    Social engagement: examine the facts when she becomes anxious about her communication with others   Therapy: practice sleep hygiene skill, grounding technique     Return to clinic: 1 month     Length of Service (minutes direct face-to-face contact): 45 minutes    Diagnosis:     ICD-10-CM ICD-9-CM   1. Adjustment disorder with other symptom  F43.29 309.89                     Myla Gallagher, PhD  Clinical Health Psychology Fellow

## 2023-11-01 NOTE — PROGRESS NOTES
Review of Symptoms      Symptom Assessment (ESAS 0-10 Scale)  Pain:  0  Dyspnea:  1  Anxiety:  4  Nausea:  0  Depression:  9  Anorexia:  4  Fatigue:  8  Insomnia:  7  Restlessness:  9  Agitation:  10         Psychosocial/Cultural:   See Palliative Psychosocial Note: Yes  **Primary  to Follow**  Palliative Care  Consult: No          Answers submitted by the patient for this visit:  Review of Systems Questionnaire (Submitted on 9/4/2023)  appetite change : No  unexpected weight change: No  mouth sores: No  visual disturbance: No  cough: No  shortness of breath: No  chest pain: No  abdominal pain: No  diarrhea: No  frequency: No  back pain: Yes  rash: Yes  headaches: No  adenopathy: No  nervous/ anxious: Yes

## 2023-11-02 ENCOUNTER — RESEARCH ENCOUNTER (OUTPATIENT)
Dept: RESEARCH | Facility: HOSPITAL | Age: 53
End: 2023-11-02
Payer: MEDICAID

## 2023-11-02 ENCOUNTER — PATIENT MESSAGE (OUTPATIENT)
Dept: ENDOCRINOLOGY | Facility: CLINIC | Age: 53
End: 2023-11-02
Payer: MEDICAID

## 2023-11-02 ENCOUNTER — TELEPHONE (OUTPATIENT)
Dept: HEMATOLOGY/ONCOLOGY | Facility: CLINIC | Age: 53
End: 2023-11-02
Payer: MEDICAID

## 2023-11-02 ENCOUNTER — OFFICE VISIT (OUTPATIENT)
Dept: HEMATOLOGY/ONCOLOGY | Facility: CLINIC | Age: 53
End: 2023-11-02
Payer: MEDICAID

## 2023-11-02 ENCOUNTER — LAB VISIT (OUTPATIENT)
Dept: LAB | Facility: HOSPITAL | Age: 53
End: 2023-11-02
Attending: INTERNAL MEDICINE
Payer: MEDICAID

## 2023-11-02 VITALS
BODY MASS INDEX: 46.06 KG/M2 | SYSTOLIC BLOOD PRESSURE: 110 MMHG | HEART RATE: 68 BPM | WEIGHT: 286.63 LBS | TEMPERATURE: 98 F | HEIGHT: 66 IN | DIASTOLIC BLOOD PRESSURE: 78 MMHG | OXYGEN SATURATION: 98 % | RESPIRATION RATE: 16 BRPM

## 2023-11-02 DIAGNOSIS — C18.7 MALIGNANT NEOPLASM OF SIGMOID COLON: Primary | ICD-10-CM

## 2023-11-02 DIAGNOSIS — E83.52 HYPERCALCEMIA: ICD-10-CM

## 2023-11-02 DIAGNOSIS — E04.2 MULTINODULAR GOITER: ICD-10-CM

## 2023-11-02 DIAGNOSIS — Z00.6 EXAMINATION OF PARTICIPANT IN CLINICAL TRIAL: ICD-10-CM

## 2023-11-02 DIAGNOSIS — T45.1X5A IMMUNODEFICIENCY DUE TO CHEMOTHERAPY: ICD-10-CM

## 2023-11-02 DIAGNOSIS — E03.8 OTHER SPECIFIED HYPOTHYROIDISM: ICD-10-CM

## 2023-11-02 DIAGNOSIS — Z79.899 IMMUNODEFICIENCY DUE TO CHEMOTHERAPY: ICD-10-CM

## 2023-11-02 DIAGNOSIS — L70.8 ACNEIFORM RASH: ICD-10-CM

## 2023-11-02 DIAGNOSIS — D84.821 IMMUNODEFICIENCY DUE TO CHEMOTHERAPY: ICD-10-CM

## 2023-11-02 DIAGNOSIS — C18.7 MALIGNANT NEOPLASM OF SIGMOID COLON: ICD-10-CM

## 2023-11-02 DIAGNOSIS — C78.6 SECONDARY MALIGNANT NEOPLASM OF RETROPERITONEUM: ICD-10-CM

## 2023-11-02 DIAGNOSIS — C77.9 SECONDARY ADENOCARCINOMA OF LYMPH NODE: ICD-10-CM

## 2023-11-02 LAB
ALBUMIN SERPL BCP-MCNC: 3.9 G/DL (ref 3.5–5.2)
ALBUMIN SERPL BCP-MCNC: 3.9 G/DL (ref 3.5–5.2)
ALP SERPL-CCNC: 123 U/L (ref 55–135)
ALT SERPL W/O P-5'-P-CCNC: 30 U/L (ref 10–44)
ANION GAP SERPL CALC-SCNC: 8 MMOL/L (ref 8–16)
ANION GAP SERPL CALC-SCNC: 8 MMOL/L (ref 8–16)
AST SERPL-CCNC: 21 U/L (ref 10–40)
BASOPHILS # BLD AUTO: 0.06 K/UL (ref 0–0.2)
BASOPHILS NFR BLD: 1.1 % (ref 0–1.9)
BILIRUB SERPL-MCNC: 0.3 MG/DL (ref 0.1–1)
BUN SERPL-MCNC: 12 MG/DL (ref 6–20)
BUN SERPL-MCNC: 12 MG/DL (ref 6–20)
CA-I BLDV-SCNC: 1.49 MMOL/L (ref 1.06–1.42)
CALCIUM SERPL-MCNC: 11.2 MG/DL (ref 8.7–10.5)
CALCIUM SERPL-MCNC: 11.2 MG/DL (ref 8.7–10.5)
CHLORIDE SERPL-SCNC: 108 MMOL/L (ref 95–110)
CHLORIDE SERPL-SCNC: 108 MMOL/L (ref 95–110)
CO2 SERPL-SCNC: 21 MMOL/L (ref 23–29)
CO2 SERPL-SCNC: 21 MMOL/L (ref 23–29)
CREAT SERPL-MCNC: 0.9 MG/DL (ref 0.5–1.4)
CREAT SERPL-MCNC: 0.9 MG/DL (ref 0.5–1.4)
DIFFERENTIAL METHOD: ABNORMAL
EOSINOPHIL # BLD AUTO: 0.4 K/UL (ref 0–0.5)
EOSINOPHIL NFR BLD: 7.3 % (ref 0–8)
ERYTHROCYTE [DISTWIDTH] IN BLOOD BY AUTOMATED COUNT: 14.9 % (ref 11.5–14.5)
EST. GFR  (NO RACE VARIABLE): >60 ML/MIN/1.73 M^2
EST. GFR  (NO RACE VARIABLE): >60 ML/MIN/1.73 M^2
GLUCOSE SERPL-MCNC: 108 MG/DL (ref 70–110)
GLUCOSE SERPL-MCNC: 108 MG/DL (ref 70–110)
HCT VFR BLD AUTO: 46.8 % (ref 37–48.5)
HGB BLD-MCNC: 14.4 G/DL (ref 12–16)
IMM GRANULOCYTES # BLD AUTO: 0.02 K/UL (ref 0–0.04)
IMM GRANULOCYTES NFR BLD AUTO: 0.4 % (ref 0–0.5)
LYMPHOCYTES # BLD AUTO: 1.4 K/UL (ref 1–4.8)
LYMPHOCYTES NFR BLD: 25.6 % (ref 18–48)
MAGNESIUM SERPL-MCNC: 1.9 MG/DL (ref 1.6–2.6)
MCH RBC QN AUTO: 26.7 PG (ref 27–31)
MCHC RBC AUTO-ENTMCNC: 30.8 G/DL (ref 32–36)
MCV RBC AUTO: 87 FL (ref 82–98)
MONOCYTES # BLD AUTO: 0.6 K/UL (ref 0.3–1)
MONOCYTES NFR BLD: 11.2 % (ref 4–15)
NEUTROPHILS # BLD AUTO: 2.9 K/UL (ref 1.8–7.7)
NEUTROPHILS NFR BLD: 54.4 % (ref 38–73)
NRBC BLD-RTO: 0 /100 WBC
PHOSPHATE SERPL-MCNC: 3.1 MG/DL (ref 2.7–4.5)
PHOSPHATE SERPL-MCNC: 3.1 MG/DL (ref 2.7–4.5)
PLATELET # BLD AUTO: 316 K/UL (ref 150–450)
PMV BLD AUTO: 9.5 FL (ref 9.2–12.9)
POTASSIUM SERPL-SCNC: 4.4 MMOL/L (ref 3.5–5.1)
POTASSIUM SERPL-SCNC: 4.4 MMOL/L (ref 3.5–5.1)
PROT SERPL-MCNC: 7.4 G/DL (ref 6–8.4)
PTH-INTACT SERPL-MCNC: 128.7 PG/ML (ref 9–77)
RBC # BLD AUTO: 5.39 M/UL (ref 4–5.4)
SODIUM SERPL-SCNC: 137 MMOL/L (ref 136–145)
SODIUM SERPL-SCNC: 137 MMOL/L (ref 136–145)
T4 FREE SERPL-MCNC: 0.72 NG/DL (ref 0.71–1.51)
TSH SERPL DL<=0.005 MIU/L-ACNC: 36.23 UIU/ML (ref 0.4–4)
WBC # BLD AUTO: 5.36 K/UL (ref 3.9–12.7)

## 2023-11-02 PROCEDURE — 99214 OFFICE O/P EST MOD 30 MIN: CPT | Mod: PBBFAC,PN | Performed by: INTERNAL MEDICINE

## 2023-11-02 PROCEDURE — 84439 ASSAY OF FREE THYROXINE: CPT | Performed by: INTERNAL MEDICINE

## 2023-11-02 PROCEDURE — 3008F PR BODY MASS INDEX (BMI) DOCUMENTED: ICD-10-PCS | Mod: CPTII,,, | Performed by: INTERNAL MEDICINE

## 2023-11-02 PROCEDURE — 80069 RENAL FUNCTION PANEL: CPT | Mod: PN | Performed by: INTERNAL MEDICINE

## 2023-11-02 PROCEDURE — 85025 COMPLETE CBC W/AUTO DIFF WBC: CPT | Mod: PN | Performed by: INTERNAL MEDICINE

## 2023-11-02 PROCEDURE — 3078F PR MOST RECENT DIASTOLIC BLOOD PRESSURE < 80 MM HG: ICD-10-PCS | Mod: CPTII,,, | Performed by: INTERNAL MEDICINE

## 2023-11-02 PROCEDURE — 83735 ASSAY OF MAGNESIUM: CPT | Mod: PN | Performed by: INTERNAL MEDICINE

## 2023-11-02 PROCEDURE — 3078F DIAST BP <80 MM HG: CPT | Mod: CPTII,,, | Performed by: INTERNAL MEDICINE

## 2023-11-02 PROCEDURE — 84443 ASSAY THYROID STIM HORMONE: CPT | Performed by: INTERNAL MEDICINE

## 2023-11-02 PROCEDURE — 99215 PR OFFICE/OUTPT VISIT, EST, LEVL V, 40-54 MIN: ICD-10-PCS | Mod: S$PBB,,, | Performed by: INTERNAL MEDICINE

## 2023-11-02 PROCEDURE — 3074F SYST BP LT 130 MM HG: CPT | Mod: CPTII,,, | Performed by: INTERNAL MEDICINE

## 2023-11-02 PROCEDURE — 1159F PR MEDICATION LIST DOCUMENTED IN MEDICAL RECORD: ICD-10-PCS | Mod: CPTII,,, | Performed by: INTERNAL MEDICINE

## 2023-11-02 PROCEDURE — 82330 ASSAY OF CALCIUM: CPT | Performed by: INTERNAL MEDICINE

## 2023-11-02 PROCEDURE — 99215 OFFICE O/P EST HI 40 MIN: CPT | Mod: S$PBB,,, | Performed by: INTERNAL MEDICINE

## 2023-11-02 PROCEDURE — 36415 COLL VENOUS BLD VENIPUNCTURE: CPT | Mod: PN | Performed by: INTERNAL MEDICINE

## 2023-11-02 PROCEDURE — 3074F PR MOST RECENT SYSTOLIC BLOOD PRESSURE < 130 MM HG: ICD-10-PCS | Mod: CPTII,,, | Performed by: INTERNAL MEDICINE

## 2023-11-02 PROCEDURE — 80053 COMPREHEN METABOLIC PANEL: CPT | Mod: PN | Performed by: INTERNAL MEDICINE

## 2023-11-02 PROCEDURE — 3008F BODY MASS INDEX DOCD: CPT | Mod: CPTII,,, | Performed by: INTERNAL MEDICINE

## 2023-11-02 PROCEDURE — 1159F MED LIST DOCD IN RCRD: CPT | Mod: CPTII,,, | Performed by: INTERNAL MEDICINE

## 2023-11-02 PROCEDURE — 4010F ACE/ARB THERAPY RXD/TAKEN: CPT | Mod: CPTII,,, | Performed by: INTERNAL MEDICINE

## 2023-11-02 PROCEDURE — 99999 PR PBB SHADOW E&M-EST. PATIENT-LVL IV: ICD-10-PCS | Mod: PBBFAC,,, | Performed by: INTERNAL MEDICINE

## 2023-11-02 PROCEDURE — 99999 PR PBB SHADOW E&M-EST. PATIENT-LVL IV: CPT | Mod: PBBFAC,,, | Performed by: INTERNAL MEDICINE

## 2023-11-02 PROCEDURE — 83970 ASSAY OF PARATHORMONE: CPT | Performed by: INTERNAL MEDICINE

## 2023-11-02 PROCEDURE — 4010F PR ACE/ARB THEARPY RXD/TAKEN: ICD-10-PCS | Mod: CPTII,,, | Performed by: INTERNAL MEDICINE

## 2023-11-02 NOTE — PROGRESS NOTES
Subjective     Patient ID: Gail Mckinnon is a 53 y.o. female.    Chief Complaint: Malignant neoplasm of sigmoid colon  Gail Mckinnon is a 53 y.o. female who presents with colon cancer, initially stage III and now with LN recurrence.    On FOLIRI+ Avastin sine 5/21.      She was briefly switched to xeloda due to 5 FU shortage for a month. Did not tolerate Xeloda well due hand foot syndrome, blistering of feet, did not take the last day of Xeloda. Completed 4/1.      Resumed Folfiri + Avastin on 4/26/23. Atropine was held due to prior constipation.      Was admitted to the hospital from 5/2-5/12/23 for intractable nausea , vomiting and diarrhea as well as abdominal pain. No hematochezia or dark stool was noted.       US showed gallbladder stone and dilated CBD. She was managed conservatively. Had CT abd, pelvis, colonoscopy and MRCP that were relatively unremarkable without signs of cholecystitis. Cholecystectomy was not recommended.      She had recently resumed FOLFIRI and the symptoms started after the first cycle of resuming, never had issues prior to this.  Stayed in the hospital for 10 days.  C diff was negative. No other etiology was established. Symptoms improved over time and she was discharged on 5/12.     Syed is enrolled on SWOG 2107 (encorafenib/cetuximab and randomized to the Nivolumab arm) trial, here for cycle 3, day 15 with Cetuximab and Encorafenib  She is tolerating her chemo well. Notes acneiform rash and takes Doxy antibiotics     She denies any nausea, vomiting, diarrhea, constipation, abdominal pain, weight loss or loss of appetite, chest pain, shortness of breath, leg swelling, fatigue, pain, headache, dizziness, or mood changes. Her ECOG PS is zero. She is here by herself      Review of Systems   Constitutional:  Negative for appetite change, fatigue and unexpected weight change.   HENT:  Negative for mouth sores.    Eyes:  Negative for visual disturbance.   Respiratory:   Negative for cough and shortness of breath.    Cardiovascular:  Negative for chest pain.   Gastrointestinal:  Negative for abdominal pain and diarrhea.   Genitourinary:  Negative for frequency.   Musculoskeletal:  Negative for back pain.   Integumentary:  Negative for rash.   Neurological:  Negative for headaches.   Hematological:  Negative for adenopathy.   Psychiatric/Behavioral:  The patient is not nervous/anxious.    All other systems reviewed and are negative.         Objective     Physical Exam  Vitals reviewed.   Constitutional:       Appearance: She is well-developed.   HENT:      Mouth/Throat:      Pharynx: No oropharyngeal exudate.   Cardiovascular:      Rate and Rhythm: Normal rate.      Heart sounds: Normal heart sounds.   Pulmonary:      Effort: Pulmonary effort is normal.      Breath sounds: Normal breath sounds. No wheezing.   Abdominal:      General: Bowel sounds are normal.      Palpations: Abdomen is soft.      Tenderness: There is no abdominal tenderness.   Musculoskeletal:         General: No tenderness.   Lymphadenopathy:      Cervical: No cervical adenopathy.   Skin:     General: Skin is warm and dry.      Findings: No rash.      Comments: Acneiform rash on face    Neurological:      Mental Status: She is alert and oriented to person, place, and time.      Coordination: Coordination normal.   Psychiatric:         Thought Content: Thought content normal.         Judgment: Judgment normal.           LABS:  WBC   Date Value Ref Range Status   11/02/2023 5.36 3.90 - 12.70 K/uL Final     Hemoglobin   Date Value Ref Range Status   11/02/2023 14.4 12.0 - 16.0 g/dL Final     Hematocrit   Date Value Ref Range Status   11/02/2023 46.8 37.0 - 48.5 % Final     Platelets   Date Value Ref Range Status   11/02/2023 316 150 - 450 K/uL Final     Gran # (ANC)   Date Value Ref Range Status   11/02/2023 2.9 1.8 - 7.7 K/uL Final     Gran %   Date Value Ref Range Status   11/02/2023 54.4 38.0 - 73.0 % Final        Chemistry        Component Value Date/Time     2023 0909     2023 0909    K 4.4 2023 0909    K 4.4 2023 0909     2023 0909     2023 0909    CO2 21 (L) 2023 0909    CO2 21 (L) 2023 0909    BUN 12 2023 0909    BUN 12 2023 0909    CREATININE 0.9 2023 0909    CREATININE 0.9 2023 0909     2023 0909     2023 0909        Component Value Date/Time    CALCIUM 11.2 (H) 2023 0909    CALCIUM 11.2 (H) 2023 0909    ALKPHOS 123 2023 0909    AST 21 2023 0909    ALT 30 2023 0909    BILITOT 0.3 2023 0909    ESTGFRAFRICA >60 06/15/2022 1238    ESTGFRAFRICA >60 06/15/2022 1238    EGFRNONAA >60 06/15/2022 1238    EGFRNONAA >60 06/15/2022 1238        Ma.9    TSH   Date Value Ref Range Status   2023 0.525 0.400 - 4.000 uIU/mL Final     Free T4   Date Value Ref Range Status   2023 0.99 0.71 - 1.51 ng/dL Final            Assessment and Plan     1. Malignant neoplasm of sigmoid colon    2. Secondary adenocarcinoma of lymph node    3. Secondary malignant neoplasm of retroperitoneum  Overview:  Formatting of this note might be different from the original.  Added automatically from request for surgery [id number]      4. Immunodeficiency due to chemotherapy    5. Acneiform rash    6. Hypercalcemia        Route Chart for Scheduling    Med Onc Chart Routing      Follow up with physician . As scheduled   Follow up with TAVO    Infusion scheduling note    Injection scheduling note    Labs    Imaging    Pharmacy appointment    Other referrals                  Treatment Plan Information   [facility] - ARM 1 ENCORAFENIB  CETUXIMAB NIVOLUMAB   Marah Santo MD   Upcoming Treatment Dates - [facility] - ARM 1 ENCORAFENIB  CETUXIMAB NIVOLUMAB    11/3/2023       Pre-Medications       hydrocortisone sodium succinate injection 50 mg       diphenhydrAMINE (BENADRYL) 50 mg in NS 50 mL IVPB        acetaminophen tablet 650 mg       ondansetron injection 8 mg       Chemotherapy       cetuximab (ERBITUX) 100 mg/50 mL chemo infusion 1,230 mg  11/17/2023       Pre-Medications       ondansetron injection 8 mg       hydrocortisone sodium succinate injection 50 mg       diphenhydrAMINE (BENADRYL) 50 mg in NS 50 mL IVPB       acetaminophen tablet 650 mg       Chemotherapy       cetuximab (ERBITUX) 100 mg/50 mL chemo infusion 1,230 mg       INV nivolumab 480 mg in INV sodium chloride 0.9 % 100 mL chemo infusion  12/1/2023       Pre-Medications       ondansetron injection 8 mg       hydrocortisone sodium succinate injection 50 mg       diphenhydrAMINE (BENADRYL) 50 mg in NS 50 mL IVPB       acetaminophen tablet 650 mg       Chemotherapy       cetuximab (ERBITUX) 100 mg/50 mL chemo infusion 1,230 mg  12/15/2023       Pre-Medications       ondansetron injection 8 mg       hydrocortisone sodium succinate injection 50 mg       diphenhydrAMINE (BENADRYL) 50 mg in NS 50 mL IVPB       acetaminophen tablet 650 mg       Chemotherapy       cetuximab (ERBITUX) 100 mg/50 mL chemo infusion 1,230 mg       INV nivolumab 480 mg in INV sodium chloride 0.9 % 100 mL chemo infusion    Supportive Plan Information  IV FLUIDS AND ELECTROLYTES   Brayden Solo MD   Upcoming Treatment Dates - IV FLUIDS AND ELECTROLYTES    No upcoming days in selected categories.    Therapy Plan Information  PORT FLUSH  Flushes  heparin, porcine (PF) 100 unit/mL injection flush 500 Units  500 Units, Intravenous, Every visit  sodium chloride 0.9% flush 10 mL  10 mL, Intravenous, Every visit    Mrs. Mckinnon is doing well clinically and will proceed with cetuximab and encorafenib today as part of the [facility] clinical trial.  She will return as scheduled    Acneiform rash takes doxycycline as needed    Hypercalcemia:  Seeing endocrinology and being worked up for primary hyperparathyroidism    Above plan reviewed with the patient and all of her questions were answered  to her satisfaction

## 2023-11-02 NOTE — PROGRESS NOTES
Gail Mckinnon, MRN [id number]  PID# [id number]     Protocol: SWOG   IRB#: 2023.047  : NGUYEN Degroot MD  Treating Investigator: JESUS Santo MD     Randomized Phase II Trial of Encorafenib and Cetuximab with or Without Nivolumab (NSC #[id number]) for Patients with Previously Treated, Microsatellite Stable BRAF V600E Metastatic and/or Unresectable Colorectal Cancer     The patient was randomized to Arm 1/ Nivolumab + Cetuximab + Encorafenib therapy.     02NOV2023--> Cycle 3 Day 14     03NOV2023 à C3 Day 15       The patient presented to the planned office visit alone. The patient was alert, oriented, without noted distress, with appropriate mood and affect for the situation, and freely agreed to continue with participating in the referenced study.  Dr. Pimentel is seeing the patient today due to Dr. Santo being out of the office.     Pt completed the required pre-tx labs on 02NOV2023: CBC w/ diff, CMP, Magnesium resulted and reviewed.   Protocol does not require TSH at this time point. Endocrinology following TSH for patient's baseline Grade II Hypothyroidism (resulted from treatment for Hyperthyroidism). See communication from study chair regarding TSH monitoring for this patient in Cycle 1 section of shadow chart.     ConMed list reviewed with the patient for accuracy- see shadow chart for changes.     Patient admits to being compliant with QD dosing of encorafenib and the use of the protocol's pill diary.   The patient verbalizes understanding to return remaining encorafenib and protocol pill diary at 16NOV2023 appointment.   Last dose of Encorafenib from Cycle 3 refill/supply expected to take place on Thursday 16NOV2023 (C3D28)      Pt. made aware to call this CRC if she does not receive communication for next Encorafenib refill from [facility] by second week in November.   The patient verbalized understanding to take Encorafenib #4 capsule with water in the morning at the same time each day.  Pt. also agreed to taking before leaving home on the days of infusional therapy to provide one hour time frame before receiving oral pre-medications.     Baseline AEs:  See shadow chart for full list of Baseline AEs.     Baseline Hypothyroidism- Grade 2 (Start date 2012-continues) per Dr. Moss's documentation, the hypothyroidism resulted from treatment for hyperthyroidism. TSH ordered by endocrinologist and drawn today-will report tomorrow.  Reviewed Section 8.3.1 Nivolumab dose mods with treating MD. As of today, the Grade II Hypothyroidism present at Baseline, has not increased in grade, and the patient is asymptomatic per the treating MD's PE. The patient denies any persistent headaches or visual changes.   See source doc communication with Dr. Arechiga, Study Chair, in Cycle 1 Day 15 section.  Baseline Fatigue- Grade 1- pt reports continues without increase in grade and able to perform all ADLS.  Baseline Hypercalcemia-Grade 1 continues- corrected calcium calculation attached= 10.9 mg/dL. Endocrinologist ordered Ionized calcium collected this morning-results pending. Will follow for 03NOV2023 results. Dr. Pimentel is aware, does not deem as CS, no new orders given.   Baseline Hyperparathyroidism- Grade 2 (Start date 2021)- Continues. [id number]- Pending [id number] PTH results-Endocrinology following. See 30OCT2023 provider note  Dr. Pimentel is aware does not deem CS; no new orders given. Will follow for [id number] results.     AEs:   See shadow chart for full list of AEs.     The patient specifically denies any nausea, diarrhea, headaches, or visual changes.     The patient denies experiencing any infusion related reaction including chills/rigors, nausea, vomiting, or headache during or after C3D1 treatment.  Per protocol Section 7 / 7.1 & Table 3 The treating MD added hydrocortisone 50mg IV to Acetaminophen and Diphenhydramine premeds and increased infusion time of Cetuximab to four hours followed by a period of  "observation for previous cycles, beginning with Cycle 1 Day 15. Per the treating MD additional pre-meds and infusion extension will continue for the patient's future infusion appts.      Rash-Maculo-papular Grade 1 Start date 8/28/2023- continues. No worsening of rash reported or noted. Pt. confirms continued use of clindamycin gel and doxycycline as prescribed for sporadic areas of pruritic rash. Mild soaps, temped water, and moisturizers recommended. The patient denies rash on other parts of her body, except the mild facial rash. The treating MD does not deem CS, and no new orders given.      Insomnia- Grade 2 (Start date 9/12/2023-continues) The treating MD deems as unlikely d/t to protocol therapy, and does not deem as CS, no new orders given. Palliative prescribed Mirtazapine and on 10/18/2023, the patient reports discontinuing Mirtazapine due to not wanting to be dependent of the medication for sleep. Endocrinologist is planning for the patient undergo a sleep study to evaluate the patient's snoring. (30OCT2023 Dr. Moss note).     Hypophosphatemia-Grade 1 (Start date 10/4/2023- Ended 11/02/2023)   Eosinophilia-(Grade 1) (Start date 10/18/2023- Ended 11/02/2023)     Vitamin D level- Low @ 18ng/mL (30-96 ng/dL) [id number]; institution reference defines 10-29ng/mL as Vitamin D insufficiency. NO CTCAE grading found. Ergocalciferol ordered by endocrinologist at 30OCT2023 office visit. Pt. admits to having started the rx. Dr. Pimentel is aware, does not deem as CS or related to protocol therapy. No new orders given.      /78      Pulse 68 Temp 97.7 Resp 16  Ht 5' 6" (1.676 m) Wt. 130kg (286 lb 9.6 oz) SpO2 98 % BSA 2.46m²      Zubrod PS: Zero per Dr. Pimentel     After reviewing all labs, VS & PE findings as either WNL or NCS, Dr. Pimentel deemed the patient appropriate to receive C3D15 of protocol treatment on 03NOV2023. At the discretion of the treating MD, the patient's current cardiac function does not " require evaluation by ECG.      Treatment Dosing for C 3 D 15:  For the patient's safety, the MD plans to continue following the protocol's recommendations of adding Hydrocortisone to other premeds and extending the infusion time of the Cetuximab to prevent delayed infusion reaction.  Infusion time extended per protocol Cetuximab-Related Infusion Reactions (Table 3).  Hydrocortisone 50mg IV added to premedication of Acetaminophen and Diphenhydramine. Protocol Section 7.0 / 7.1     C3D15 treatment plan signed in [facility] by Dr. Santo at C3 D1. Time out for review protocol treatment dosing completed by this CRC and Dr. Pimentel.  Since pt's wt. has not changed by 10% from original dosing, no dosing changes required.  Baseline BSA 2.46 m2 based on Screening: Ht. 5'6 and 287.26 lbs. (130.3 kg)      Cetuximab 500 mg/m2 x 2.46 m2 BSA = 1230 mg over 4 hours- Rounded dose via pharmacy policy resulted in a dose of 1200mg.    Encorafenib 75mg tabs: 300mg po Q day: Days 1-28- patient admits to daily compliance and documentation on pill diary.      This CRC reviewed upcoming appointments with the patient and encouraged to call with any new symptoms or issues, the patient verbalized understanding. Patient made aware to call after hour number, that she agrees to having, if after business hours or over the weekend. The patient denied having any unanswered questions at the time of leaving the [facility].      Next protocol required bio-specimen at progression of disease.     Next 8-week CT C/A/P: 12/04/2023 @ 0930 (patient informed)     Cycle 4 Day 1:    [id number] 1st floor lab @ 0815 16NOV2023 OV with Dr. Santo @ 09:20  17NOV2023 Infusion at 08:30      DAYDAY Adams RN

## 2023-11-03 ENCOUNTER — DOCUMENTATION ONLY (OUTPATIENT)
Dept: INFUSION THERAPY | Facility: HOSPITAL | Age: 53
End: 2023-11-03
Payer: MEDICAID

## 2023-11-03 ENCOUNTER — INFUSION (OUTPATIENT)
Dept: INFUSION THERAPY | Facility: HOSPITAL | Age: 53
End: 2023-11-03
Attending: INTERNAL MEDICINE
Payer: MEDICAID

## 2023-11-03 ENCOUNTER — TELEPHONE (OUTPATIENT)
Dept: ENDOCRINOLOGY | Facility: CLINIC | Age: 53
End: 2023-11-03
Payer: MEDICAID

## 2023-11-03 ENCOUNTER — DOCUMENTATION ONLY (OUTPATIENT)
Dept: INFUSION THERAPY | Facility: HOSPITAL | Age: 53
End: 2023-11-03

## 2023-11-03 ENCOUNTER — RESEARCH ENCOUNTER (OUTPATIENT)
Dept: RESEARCH | Facility: HOSPITAL | Age: 53
End: 2023-11-03
Payer: MEDICAID

## 2023-11-03 VITALS
SYSTOLIC BLOOD PRESSURE: 123 MMHG | WEIGHT: 286.63 LBS | TEMPERATURE: 98 F | RESPIRATION RATE: 18 BRPM | DIASTOLIC BLOOD PRESSURE: 62 MMHG | HEIGHT: 66 IN | BODY MASS INDEX: 46.06 KG/M2 | HEART RATE: 77 BPM

## 2023-11-03 DIAGNOSIS — C18.7 MALIGNANT NEOPLASM OF SIGMOID COLON: ICD-10-CM

## 2023-11-03 DIAGNOSIS — C77.2 METASTASIS TO INTESTINAL LYMPH NODE: Primary | ICD-10-CM

## 2023-11-03 DIAGNOSIS — E04.2 MULTINODULAR GOITER: ICD-10-CM

## 2023-11-03 LAB
T4 FREE SERPL-MCNC: 0.9 NG/DL (ref 0.71–1.51)
TSH SERPL DL<=0.005 MIU/L-ACNC: 27.39 UIU/ML (ref 0.4–4)

## 2023-11-03 PROCEDURE — 84439 ASSAY OF FREE THYROXINE: CPT | Performed by: INTERNAL MEDICINE

## 2023-11-03 PROCEDURE — 96375 TX/PRO/DX INJ NEW DRUG ADDON: CPT | Mod: PN

## 2023-11-03 PROCEDURE — 84443 ASSAY THYROID STIM HORMONE: CPT | Performed by: INTERNAL MEDICINE

## 2023-11-03 PROCEDURE — 96367 TX/PROPH/DG ADDL SEQ IV INF: CPT | Mod: PN

## 2023-11-03 PROCEDURE — 96415 CHEMO IV INFUSION ADDL HR: CPT | Mod: PN

## 2023-11-03 PROCEDURE — 96413 CHEMO IV INFUSION 1 HR: CPT | Mod: PN

## 2023-11-03 PROCEDURE — 63600175 PHARM REV CODE 636 W HCPCS: Mod: JZ,JG,PN | Performed by: INTERNAL MEDICINE

## 2023-11-03 PROCEDURE — 25000003 PHARM REV CODE 250: Mod: PN | Performed by: INTERNAL MEDICINE

## 2023-11-03 RX ORDER — ONDANSETRON 2 MG/ML
8 INJECTION INTRAMUSCULAR; INTRAVENOUS
Status: COMPLETED | OUTPATIENT
Start: 2023-11-03 | End: 2023-11-03

## 2023-11-03 RX ORDER — EPINEPHRINE 0.3 MG/.3ML
0.3 INJECTION SUBCUTANEOUS ONCE AS NEEDED
Status: DISCONTINUED | OUTPATIENT
Start: 2023-11-03 | End: 2023-11-03 | Stop reason: HOSPADM

## 2023-11-03 RX ORDER — DIPHENHYDRAMINE HYDROCHLORIDE 50 MG/ML
50 INJECTION INTRAMUSCULAR; INTRAVENOUS ONCE AS NEEDED
Status: DISCONTINUED | OUTPATIENT
Start: 2023-11-03 | End: 2023-11-03 | Stop reason: HOSPADM

## 2023-11-03 RX ORDER — SODIUM CHLORIDE 0.9 % (FLUSH) 0.9 %
10 SYRINGE (ML) INJECTION
Status: DISCONTINUED | OUTPATIENT
Start: 2023-11-03 | End: 2023-11-03 | Stop reason: HOSPADM

## 2023-11-03 RX ORDER — HEPARIN 100 UNIT/ML
500 SYRINGE INTRAVENOUS
Status: DISCONTINUED | OUTPATIENT
Start: 2023-11-03 | End: 2023-11-03 | Stop reason: HOSPADM

## 2023-11-03 RX ORDER — ACETAMINOPHEN 325 MG/1
650 TABLET ORAL
Status: COMPLETED | OUTPATIENT
Start: 2023-11-03 | End: 2023-11-03

## 2023-11-03 RX ADMIN — DIPHENHYDRAMINE HYDROCHLORIDE 50 MG: 50 INJECTION, SOLUTION INTRAMUSCULAR; INTRAVENOUS at 09:11

## 2023-11-03 RX ADMIN — CETUXIMAB 1200 MG: 2 SOLUTION INTRAVENOUS at 10:11

## 2023-11-03 RX ADMIN — SODIUM CHLORIDE: 9 INJECTION, SOLUTION INTRAVENOUS at 09:11

## 2023-11-03 RX ADMIN — ACETAMINOPHEN 650 MG: 325 TABLET, FILM COATED ORAL at 09:11

## 2023-11-03 RX ADMIN — HYDROCORTISONE SODIUM SUCCINATE 50 MG: 100 INJECTION, POWDER, FOR SOLUTION INTRAMUSCULAR; INTRAVENOUS at 09:11

## 2023-11-03 RX ADMIN — ONDANSETRON 8 MG: 2 INJECTION INTRAMUSCULAR; INTRAVENOUS at 09:11

## 2023-11-03 RX ADMIN — Medication 500 UNITS: at 02:11

## 2023-11-03 NOTE — TELEPHONE ENCOUNTER
Her TSH is elevated.   Can we make sure she is taking the correct dose and by itself before I adjust dose

## 2023-11-03 NOTE — PROGRESS NOTES
Oncology Nutrition   Gail Mckinnon   1970    [employer]     Pt eligible for [employer] . Order filled out with patient at chairside. All patient questions or concerns regarding  and/or nutrition status addressed. RD to continue to monitor and provide patient food while under active treatment PRN.     Food order filled by this RD- will provide to patient at end of infusion today.    Pt is drinking Ensure Max Protein ONS as able and is really liking the different flavors.     Raquel Bullock, MS, RD, LDN  11/03/2023  1:15 PM

## 2023-11-03 NOTE — TELEPHONE ENCOUNTER
S/w pt, she is taking levothyroxine 125 mcg - 2 tabs (250 mcg) daily.  Admits she sometimes takes it around the time of other meds.  Advised to take it by itself, first thing in the morning and wait 30-60 mins before other meds/coffee/food, etc.  Pt verb understanding.

## 2023-11-03 NOTE — PROGRESS NOTES
SW will start providing pt with gas cards. She had financial needs and increased trips tot [facility] adding an additional burden on her.     SW provided first gas card today. Pt was thankful for the assistance.     JENNIE SamuelsW

## 2023-11-03 NOTE — PROGRESS NOTES
Gail Mckinnon, MRN [id number]  PID# [id number]     Protocol: SWOG   IRB#: 2023.047  : NGUYEN Degroot MD  Treating Investigator: JESUS Santo MD     Randomized Phase II Trial of Encorafenib and Cetuximab with or Without Nivolumab (NSC #[id number]) for Patients with Previously Treated, Microsatellite Stable BRAF V600E Metastatic and/or Unresectable Colorectal Cancer      The patient was randomized to Arm 1/ Nivolumab + Cetuximab + Encorafenib therapy.      03NOV2023 C3 Day 15      The patient was seen by the oncologist/ Dr. Pimentel with required pre-tx labs on 02NOV2023: CBC w/ diff, CMP, Magnesium resulted and reviewed, and the patient was deemed eligible to receive C3 D15 Erbitux as planned for today. See 02NOV2023 CRC note.  Protocol does not require TSH at this time point. Endocrinology following TSH for patient's baseline Grade II Hypothyroidism (resulted from treatment for Hyperthyroidism). See communication from study chair regarding TSH monitoring for this patient in Cycle 1 section of shadow chart.    02NOV2023 Endocrinologist ordered labs PTH, TSH, Ionized Calcium resulted overnight since testing is performed at [facility] lab.  Upon arriving this morning, it was noted that the patient's TSH was elevated with a WNL Free T4 result.    Baseline Hypercalcemia-Grade 1 Continues- 02NOV2023 resulted Ionized calcium reported as 1.49 mmol/L (1.06- 1.42 mmol/L)   Baseline Hyperparathyroidism- Grade 2 Continues (Start date 2021) 02NOV2023 PTH resulted at same range of elevation since 04OCT2023.  Baseline Fatigue- Grade 1- pt reports continues without increase in grade and able to perform all ADLS.     The patient denies any change in health or AEs since yesterday's visit. Specifically denies any change in level of fatigue, headache, visual changes, N/v, diarrhea.  After protocol  review, dose modification section, no specifics noted for holding or modifying today's planned Day 15 Erbitux; Nivolumab  8.3.1 section reviewed and Section 18.3 Endocrinopathy AE Management algorithm this CRC contacted Dr. Pimentel for instructions. Dr. Pimentel deemed the patient appropriate for Day 15 Cetuximab at originally planned dose, and instructions to check for Synthroid adjustment via endocrinologist. (See attached)      The treating endocrinologist/Dr. Moss was contacted and is uncertain to relate the 02NOV2023 TSH to the Nivolumab since there have been recent Synthroid dose adjustments made. The patient admits to taking Levothyroxine/Synthroid daily but not always at the same time and periodically takes close to the time of Encorafenib. Repeat TSH ordered by Dr. Moss and collected by infusion nurse. Per Dr. Moss, no Synthroid dose change at this time will await repeat TSH result. (See Attached)    After instructions from Dr. Pimentel, this CRC notified the infusion nurse that is was okay to proceed with planned treatment.  Per ROSS Pitt RN/Infusion nurse the patient tolerated treatment with noted distress. (See MAR and infusion nurse note)    This CRC plans to send an email to the  study chair on Monday, November 6th once 03NOV2023 redrawn TSH results to confirm how to proceed with Nivolumab administration for Cycle 4 Day 1.  Dr. Santo, the treating MD is out of the office today. However, this CRC will also review today's findings and decisions with Dr. Santo on her return Monday, November 6th, 2023.        This CRC reviewed upcoming appointments with the patient and encouraged to call with any new symptoms or issues, the patient verbalized understanding. Patient made aware to call after hour number, that she agrees to having, if after business hours or over the weekend. The patient denied having any unanswered questions.    CHANDRIKA Adams RN

## 2023-11-05 DIAGNOSIS — F32.A DEPRESSION, UNSPECIFIED DEPRESSION TYPE: ICD-10-CM

## 2023-11-06 ENCOUNTER — TELEPHONE (OUTPATIENT)
Dept: ENDOCRINOLOGY | Facility: CLINIC | Age: 53
End: 2023-11-06
Payer: MEDICAID

## 2023-11-06 ENCOUNTER — HOSPITAL ENCOUNTER (OUTPATIENT)
Dept: RADIOLOGY | Facility: HOSPITAL | Age: 53
Discharge: HOME | End: 2023-11-06
Attending: INTERNAL MEDICINE
Payer: MEDICAID

## 2023-11-06 DIAGNOSIS — E83.52 HYPERCALCEMIA: ICD-10-CM

## 2023-11-06 DIAGNOSIS — E03.8 OTHER SPECIFIED HYPOTHYROIDISM: ICD-10-CM

## 2023-11-06 DIAGNOSIS — E03.8 OTHER SPECIFIED HYPOTHYROIDISM: Primary | ICD-10-CM

## 2023-11-06 DIAGNOSIS — C18.7 MALIGNANT NEOPLASM OF SIGMOID COLON: ICD-10-CM

## 2023-11-06 DIAGNOSIS — E04.2 MULTINODULAR GOITER: ICD-10-CM

## 2023-11-06 DIAGNOSIS — Z00.6 EXAMINATION OF PARTICIPANT IN CLINICAL TRIAL: ICD-10-CM

## 2023-11-06 PROCEDURE — 76536 US EXAM OF HEAD AND NECK: CPT | Mod: TC,PO

## 2023-11-06 PROCEDURE — 76536 US EXAM OF HEAD AND NECK: CPT | Mod: 26,,, | Performed by: RADIOLOGY

## 2023-11-06 PROCEDURE — 76536 US SOFT TISSUE HEAD NECK THYROID: ICD-10-PCS | Mod: 26,,, | Performed by: RADIOLOGY

## 2023-11-06 RX ORDER — LEVOTHYROXINE SODIUM 150 UG/1
300 TABLET ORAL
Qty: 60 TABLET | Refills: 11 | Status: SHIPPED | OUTPATIENT
Start: 2023-11-06 | End: 2023-12-14

## 2023-11-06 RX ORDER — BUPROPION HYDROCHLORIDE 150 MG/1
150 TABLET, EXTENDED RELEASE ORAL 2 TIMES DAILY
Qty: 180 TABLET | Refills: 0 | Status: SHIPPED | OUTPATIENT
Start: 2023-11-06 | End: 2024-02-11 | Stop reason: SDUPTHER

## 2023-11-06 NOTE — TELEPHONE ENCOUNTER
TSH still elevated   Increase Synthroid from 250 mcg once daily to 300 mcg once daily.  She will take two 150 mcg tablets once daily to make 300 mcg    Check TSH 6 weeks

## 2023-11-07 ENCOUNTER — PATIENT MESSAGE (OUTPATIENT)
Dept: HEMATOLOGY/ONCOLOGY | Facility: CLINIC | Age: 53
End: 2023-11-07
Payer: MEDICAID

## 2023-11-14 DIAGNOSIS — R11.0 NAUSEA: ICD-10-CM

## 2023-11-14 DIAGNOSIS — F32.A DEPRESSIVE DISORDER: ICD-10-CM

## 2023-11-14 DIAGNOSIS — Z51.5 PALLIATIVE CARE BY SPECIALIST: ICD-10-CM

## 2023-11-14 DIAGNOSIS — C18.7 MALIGNANT NEOPLASM OF SIGMOID COLON: ICD-10-CM

## 2023-11-14 RX ORDER — LORAZEPAM 1 MG/1
1 TABLET ORAL EVERY 12 HOURS PRN
Qty: 30 TABLET | Refills: 0 | Status: CANCELLED | OUTPATIENT
Start: 2023-11-14 | End: 2024-11-13

## 2023-11-15 DIAGNOSIS — C18.7 MALIGNANT NEOPLASM OF SIGMOID COLON: ICD-10-CM

## 2023-11-15 DIAGNOSIS — R11.0 NAUSEA: ICD-10-CM

## 2023-11-15 RX ORDER — DULOXETIN HYDROCHLORIDE 30 MG/1
60 CAPSULE, DELAYED RELEASE ORAL DAILY
Qty: 60 CAPSULE | Refills: 0 | Status: SHIPPED | OUTPATIENT
Start: 2023-11-15 | End: 2023-12-10 | Stop reason: SDUPTHER

## 2023-11-16 ENCOUNTER — RESEARCH ENCOUNTER (OUTPATIENT)
Dept: RESEARCH | Facility: HOSPITAL | Age: 53
End: 2023-11-16
Payer: MEDICAID

## 2023-11-16 ENCOUNTER — OFFICE VISIT (OUTPATIENT)
Dept: HEMATOLOGY/ONCOLOGY | Facility: CLINIC | Age: 53
End: 2023-11-16
Payer: MEDICAID

## 2023-11-16 ENCOUNTER — TELEPHONE (OUTPATIENT)
Dept: ENDOCRINOLOGY | Facility: CLINIC | Age: 53
End: 2023-11-16
Payer: MEDICAID

## 2023-11-16 ENCOUNTER — PATIENT MESSAGE (OUTPATIENT)
Dept: HEMATOLOGY/ONCOLOGY | Facility: CLINIC | Age: 53
End: 2023-11-16
Payer: MEDICAID

## 2023-11-16 ENCOUNTER — TELEPHONE (OUTPATIENT)
Dept: HEMATOLOGY/ONCOLOGY | Facility: CLINIC | Age: 53
End: 2023-11-16
Payer: MEDICAID

## 2023-11-16 VITALS
SYSTOLIC BLOOD PRESSURE: 109 MMHG | RESPIRATION RATE: 16 BRPM | HEART RATE: 90 BPM | TEMPERATURE: 97 F | DIASTOLIC BLOOD PRESSURE: 75 MMHG | WEIGHT: 286.38 LBS | HEIGHT: 66 IN | BODY MASS INDEX: 46.02 KG/M2 | OXYGEN SATURATION: 97 %

## 2023-11-16 DIAGNOSIS — C77.2 METASTASIS TO INTESTINAL LYMPH NODE: ICD-10-CM

## 2023-11-16 DIAGNOSIS — T45.1X5A IMMUNODEFICIENCY DUE TO CHEMOTHERAPY: ICD-10-CM

## 2023-11-16 DIAGNOSIS — C18.7 MALIGNANT NEOPLASM OF SIGMOID COLON: Primary | ICD-10-CM

## 2023-11-16 DIAGNOSIS — C77.9 SECONDARY ADENOCARCINOMA OF LYMPH NODE: ICD-10-CM

## 2023-11-16 DIAGNOSIS — B37.31 VAGINAL CANDIDOSIS: ICD-10-CM

## 2023-11-16 DIAGNOSIS — Z79.899 IMMUNODEFICIENCY DUE TO CHEMOTHERAPY: ICD-10-CM

## 2023-11-16 DIAGNOSIS — C78.6 SECONDARY MALIGNANT NEOPLASM OF RETROPERITONEUM: Primary | ICD-10-CM

## 2023-11-16 DIAGNOSIS — D84.821 IMMUNODEFICIENCY DUE TO CHEMOTHERAPY: ICD-10-CM

## 2023-11-16 DIAGNOSIS — C78.6 SECONDARY MALIGNANT NEOPLASM OF RETROPERITONEUM: ICD-10-CM

## 2023-11-16 DIAGNOSIS — E83.52 HYPERCALCEMIA: ICD-10-CM

## 2023-11-16 PROCEDURE — 4010F PR ACE/ARB THEARPY RXD/TAKEN: ICD-10-PCS | Mod: CPTII,,, | Performed by: INTERNAL MEDICINE

## 2023-11-16 PROCEDURE — 3074F SYST BP LT 130 MM HG: CPT | Mod: CPTII,,, | Performed by: INTERNAL MEDICINE

## 2023-11-16 PROCEDURE — 4010F ACE/ARB THERAPY RXD/TAKEN: CPT | Mod: CPTII,,, | Performed by: INTERNAL MEDICINE

## 2023-11-16 PROCEDURE — 99999 PR PBB SHADOW E&M-EST. PATIENT-LVL IV: ICD-10-PCS | Mod: PBBFAC,,, | Performed by: INTERNAL MEDICINE

## 2023-11-16 PROCEDURE — 1159F MED LIST DOCD IN RCRD: CPT | Mod: CPTII,,, | Performed by: INTERNAL MEDICINE

## 2023-11-16 PROCEDURE — 99214 OFFICE O/P EST MOD 30 MIN: CPT | Mod: PBBFAC,PN | Performed by: INTERNAL MEDICINE

## 2023-11-16 PROCEDURE — 3078F DIAST BP <80 MM HG: CPT | Mod: CPTII,,, | Performed by: INTERNAL MEDICINE

## 2023-11-16 PROCEDURE — 3008F BODY MASS INDEX DOCD: CPT | Mod: CPTII,,, | Performed by: INTERNAL MEDICINE

## 2023-11-16 PROCEDURE — 1159F PR MEDICATION LIST DOCUMENTED IN MEDICAL RECORD: ICD-10-PCS | Mod: CPTII,,, | Performed by: INTERNAL MEDICINE

## 2023-11-16 PROCEDURE — 99215 OFFICE O/P EST HI 40 MIN: CPT | Mod: S$PBB,,, | Performed by: INTERNAL MEDICINE

## 2023-11-16 PROCEDURE — 99215 PR OFFICE/OUTPT VISIT, EST, LEVL V, 40-54 MIN: ICD-10-PCS | Mod: S$PBB,,, | Performed by: INTERNAL MEDICINE

## 2023-11-16 PROCEDURE — 3078F PR MOST RECENT DIASTOLIC BLOOD PRESSURE < 80 MM HG: ICD-10-PCS | Mod: CPTII,,, | Performed by: INTERNAL MEDICINE

## 2023-11-16 PROCEDURE — 3074F PR MOST RECENT SYSTOLIC BLOOD PRESSURE < 130 MM HG: ICD-10-PCS | Mod: CPTII,,, | Performed by: INTERNAL MEDICINE

## 2023-11-16 PROCEDURE — 3008F PR BODY MASS INDEX (BMI) DOCUMENTED: ICD-10-PCS | Mod: CPTII,,, | Performed by: INTERNAL MEDICINE

## 2023-11-16 PROCEDURE — 99999 PR PBB SHADOW E&M-EST. PATIENT-LVL IV: CPT | Mod: PBBFAC,,, | Performed by: INTERNAL MEDICINE

## 2023-11-16 RX ORDER — ACETAMINOPHEN 325 MG/1
650 TABLET ORAL
Status: CANCELLED | OUTPATIENT
Start: 2023-11-17

## 2023-11-16 RX ORDER — FLUCONAZOLE 150 MG/1
150 TABLET ORAL ONCE
Qty: 1 TABLET | Refills: 3 | Status: SHIPPED | OUTPATIENT
Start: 2023-11-16 | End: 2023-11-18

## 2023-11-16 RX ORDER — HEPARIN 100 UNIT/ML
500 SYRINGE INTRAVENOUS
Status: CANCELLED | OUTPATIENT
Start: 2023-11-17

## 2023-11-16 RX ORDER — LORAZEPAM 1 MG/1
1 TABLET ORAL EVERY 12 HOURS PRN
Qty: 30 TABLET | Refills: 0 | Status: SHIPPED | OUTPATIENT
Start: 2023-11-16 | End: 2023-12-10 | Stop reason: SDUPTHER

## 2023-11-16 RX ORDER — DIPHENHYDRAMINE HYDROCHLORIDE 50 MG/ML
50 INJECTION INTRAMUSCULAR; INTRAVENOUS ONCE AS NEEDED
Status: CANCELLED | OUTPATIENT
Start: 2023-11-17

## 2023-11-16 RX ORDER — SODIUM CHLORIDE 0.9 % (FLUSH) 0.9 %
10 SYRINGE (ML) INJECTION
Status: CANCELLED | OUTPATIENT
Start: 2023-12-01

## 2023-11-16 RX ORDER — ONDANSETRON 2 MG/ML
8 INJECTION INTRAMUSCULAR; INTRAVENOUS
Status: CANCELLED
Start: 2023-11-17

## 2023-11-16 RX ORDER — ONDANSETRON 2 MG/ML
8 INJECTION INTRAMUSCULAR; INTRAVENOUS
Status: CANCELLED
Start: 2023-12-01

## 2023-11-16 RX ORDER — DIPHENHYDRAMINE HYDROCHLORIDE 50 MG/ML
50 INJECTION INTRAMUSCULAR; INTRAVENOUS ONCE AS NEEDED
Status: CANCELLED | OUTPATIENT
Start: 2023-12-01

## 2023-11-16 RX ORDER — EPINEPHRINE 0.3 MG/.3ML
0.3 INJECTION SUBCUTANEOUS ONCE AS NEEDED
Status: CANCELLED | OUTPATIENT
Start: 2023-12-01

## 2023-11-16 RX ORDER — SODIUM CHLORIDE 0.9 % (FLUSH) 0.9 %
10 SYRINGE (ML) INJECTION
Status: CANCELLED | OUTPATIENT
Start: 2023-11-17

## 2023-11-16 RX ORDER — HEPARIN 100 UNIT/ML
500 SYRINGE INTRAVENOUS
Status: CANCELLED | OUTPATIENT
Start: 2023-12-01

## 2023-11-16 RX ORDER — EPINEPHRINE 0.3 MG/.3ML
0.3 INJECTION SUBCUTANEOUS ONCE AS NEEDED
Status: CANCELLED | OUTPATIENT
Start: 2023-11-17

## 2023-11-16 RX ORDER — ACETAMINOPHEN 325 MG/1
650 TABLET ORAL
Status: CANCELLED | OUTPATIENT
Start: 2023-12-01

## 2023-11-16 NOTE — PROGRESS NOTES
Gail Mckinnon, MRN [id number]  PID# [id number]     Protocol: SWOG   IRB#: 2023.047  : NGUYEN Degroot MD  Treating Investigator: JESUS Santo MD     Randomized Phase II Trial of Encorafenib and Cetuximab with or Without Nivolumab (NSC #[id number]) for Patients with Previously Treated, Microsatellite Stable BRAF V600E Metastatic and/or Unresectable Colorectal Cancer   Cycle 1 Day 15 Arm 1/ Nivolumab + Cetuximab + Encorafenib therapy.     November 16th, 2023 --> Cycle 3 Day 28     C4 Day 1 November 17th, 2023.      The patient presented to the planned office visit alone. The patient was alert, oriented, without noted distress, with appropriate mood and affect for the situation, and freely agreed to continue with participating in the referenced study.  Per Section 7.4- Administration of cetuximab will be dosed every 14 days (+/- 7 days).     16NOV2023 pre-tx labs: CBC w/ diff, CMP, Magnesium resulted and reviewed.   Endocrinology following TSH for patient's baseline Grade II Hypothyroidism (resulted from treatment for Hyperthyroidism). See communication from study chair regarding TSH monitoring for this patient in Cycle 1 section of shadow chart.     ConMed list: reviewed with the patient for accuracy- see shadow chart for changes.     Patient admits to being compliant with QD dosing of encorafenib and the use of the protocol's pill diary.   Patient reports she did not start adjusted dose of Synthroid (300 mcg) Daily until 14NOV2023.  The patient returned remaining encorafenib and protocol pill diary, with correct count of eight tablets. Last dose from Cycle three supply taken this am.  The patient verbalized understanding to take Encorafenib #4 capsule with water in the morning at the same time each day. Pt. also agreed to taking before leaving home on the days of infusional therapy to provide one hour time frame before receiving oral pre-medications.  Pt. instructed to notify this CRC if she does not receive  communication for next Encorafenib refill from [facility] by second week in December.   Pt. admits to starting prescribed Ergocalciferol as ordered.    Baseline AEs:  See shadow chart for full list of Baseline AEs.    Baseline Hypothyroidism- Grade 2 (Start date 2012-continues) per documentation by Dr. Moss, the hypothyroidism resulted from treatment for hyperthyroidism.  Dr. Santo is aware of Synthroid dosing changes (300mcg QD) for Grade 2 BASELINE Hypothyroidism does not deem related to protocol treatment, nor CS, and no new orders given.  Reviewed Section 8.3.1 Nivolumab dose mods- The Grade II Hypothyroidism present at Baseline, has not increased in grade, and the patient is asymptomatic per the treating MD's PE. The patient denies any persistent headaches or visual changes.   See source doc communications concerning elevated 03NOV2023 TSH with WNL Free T4 with Dr. Arechiga, Study Chair, in Cycle 3 Day 15 section.  Per the patient's endocrinologist, Synthroid dose adjusted and repeat TSH planned for 14DEC2023.  Patient reports she did not start adjusted dose of Synthroid (300 mcg) Daily until 14NOV2023.  Baseline Fatigue- Grade 1- continues- pt reports being able to perform all ADLS.  Baseline Hypercalcemia-Grade 1 continues- corrected calcium calculation attached= 11.22 mg/dL. Endocrinologist monitoring as related to the patients BASELINE Hyperparathyroidism. Per Dr. Moss's office, no treatment or intervention currently.   Baseline Hyperparathyroidism- Grade 2 Continues (Start date 2021) The treating MD is aware, does not deem CS, no new orders given. Endocrinologist following patient and determining treatment strategy.     AEs:   See shadow chart for full list of AEs.     The patient specifically denies any nausea, diarrhea, or abdominal pain.     The patient denies experiencing any infusion related reaction including chills/rigors, nausea, vomiting, or headache during or after the Cycle  3 Days 1 or 15.   Per protocol Section 7 / 7.1 & Table 3 The treating MD added hydrocortisone 50mg IV to Acetaminophen and Diphenhydramine premeds and increased infusion time of Cetuximab to four hours followed by a period of observation for previous cycles, beginning with Cycle 1 Day 15. Per the treating MD additional pre-meds and infusion extension will continue for the patient's future infusion appts.      Rash-Maculo-papular Grade 1 Start date 8/28/2023- continues. No worsening of rash noted by the treating MD with today's examination. Pt. confirms continued use of clindamycin gel and doxycycline as prescribed for sporadic areas of pruritic rash. Mild soaps, temped water, and moisturizers recommended. Erbitux skin care kit provided to patient as packaged from [employer]. The treating MD is aware, dermatological exam performed by Dr Santo during today did not reveal any rash on other parts of the patient's body, except the mild facial rash. The treating MD does not deem CS, and no new orders given.    Insomnia- Grade 2 (Start date 9/12/2023-continues) The treating MD deems as unlikely d/t to protocol therapy, and does not deem as CS, no new orders given. The patient denies taking any rx or OTC medication for sleep. The patient was reminded to avoid Trazodone while on Encorafenib. Endocrinologist is planning for the patient undergo a sleep study to evaluate the patient's snoring. (30OCT2023 Dr. Moss note). Patient denies that the sleep study has been scheduled.    Vitamin D, 25 Hydroxy ordered per endocrinologist; reported less than normal limits-endocrine following; No CTCAE grading found. The treating MD does not relate to therapy, does not deem as CS, no new orders given. Ergocalciferol prescribed and patient admits to starting medication on 31OCT2023.    Vaginal Infection- (Vaginal candidosis, per treating MD) Grade 2- (Start date 11/14/2023) The patient reports minimal amount of vaginal discharge and external  itching. The treating MD does not  deem CS, related to protocol therapy. Rx for antifungal by treating MD.    /75    Pulse 90 Temp 97.4 °F (36.3 °C) (Temporal) Resp 16     Wt. 129.9 kg (286 lb 6 oz) SpO2 97%     BSA 2.46 m² Pain Sc 0-No pain     Zubrod PS: One     09OCT2023 CT scan: Recist Criteria reviewed by treating MD and calculation determined as stable disease. The treating MD is aware of the CT reporting, Right upper lobe pulmonary nodule appears slightly increased in size, and does not deem as CS and no new orders given and plans to monitor. See CT report and Recist form in Recist section of patient's shadow chart.  See [id number] email communications with Dr. Arechiga allowing the CT Scan to take place on 09OCT2023 to accommodate the patient's plans-source doc in C2 Day 15 section of the patient's shadow chart.      Dr. Santo assessed all labs, VS & any PE findings as either WNL or NCS. At the discretion of the treating MD, the patient's current cardiac function does not require evaluation by ECG.   Pt was deemed eligible to receive C 4 D 1 of protocol treatment on 17NOV2023.     Treatment Dosing for C 4 D 1:  For the patient's safety, the treating MD plans to continue following the protocol's recommendations of adding Hydrocortisone to other premeds and extending the infusion time of the Cetuximab to prevent delayed infusion reaction.  Infusion time extended per protocol Cetuximab-Related Infusion Reactions (Table 3).  Hydrocortisone 50mg IV added to premedication of Acetaminophen and Diphenhydramine. Protocol Section 7.0 / 7.1     Time out for dosing of protocol treatment completed with the treating MD.  17NOV2023 treatment plan orders reviewed with Dr Santo prior to signing.     Since pt's wt. has not changed by 10% from original dosing, no dosing changes required.  Baseline BSA 2.46 m2 based on Screening: Ht. 5'6 and 287.26 lbs. (130.3 kg)      Cetuximab 500 mg/m2 x 2.46 m2 BSA = 1230 mg  over 4 hours- Rounded dose via pharmacy policy resulted in a dose of 1200mg.  Nivolumab Flat dose of 480 mg  Encorafenib 75mg tabs: 300mg po days 1-28. New supply verified. The patient returned remaining pills from last refill with correct count of eight capsules, which were taken to [facility] pharmacy for disposal, using a antineoplastic biohazard' bag. See completed pill diary in this section of the pt. shadow chart.     This CRC reviewed upcoming appointments with the patient and encouraged to call with any new symptoms or issues, the patient verbalized understanding. Patient made aware to call after hour number, that she agrees to having, if after business hours or over the weekend. The patient is aware to return in the morning to receive cycle four day one of protocol treatment.The patient was discharged without noted distress.     Next protocol required bio-specimen (Whole Blood) due at progression of disease. (Section 9.0 Study Calendar)     Next Every 8-week CT C/A/P (Section 7.5): 12/04/2023 @ 0930 (patient informed)    Cycle 4 Day 15:    [id number] 1st floor lab @ 08:15   30NOV2023 OV with Dr. Santo @ 11:00   [id number] Infusion at 08:30      DAYDAY Adams RN

## 2023-11-16 NOTE — PROGRESS NOTES
PROGRESS NOTE    Subjective:       Patient ID: Gail Mckinnon is a 53 y.o. female.  MRN: [id number]  : 1970    Chief Complaint: Metastatic colon cancer     History of Present Illness:   Gail Mckinnon is a 53 y.o. female who presents with colon cancer, initially stage III and now with LN recurrence.    On FOLIRI+ Avastin sine .     She was briefly switched to xeloda due to 5 FU shortage for a month. Did not tolerate Xeloda well due hand foot syndrome, blistering of feet, did not take the last day of Xeloda. Completed .     Resumed Folfiri + Avastin on 23. Atropine was held due to prior constipation.     Was admitted to the hospital from -23 for intractable nausea , vomiting and diarrhea as well as abdominal pain. No hematochezia or dark stool was noted.      US showed gallbladder stone and dilated CBD. She was managed conservatively. Had CT abd, pelvis, colonoscopy and MRCP that were relatively unremarkable without signs of cholecystitis. Cholecystectomy was not recommended.     She had recently resumed FOLFIRI and the symptoms started after the first cycle of resuming, never had issues prior to this.  Stayed in the hospital for 10 days.  C diff was negative. No other etiology was established. Symptoms improved over time and she was discharged on .     Interim history:  Enrolled in  SWOG 2107 (encorafenib/cetuximab and randomized to the Nivolumab arm), here to obtain for cycle 4, day 1, due tomorrow.    We reviewed re staging done as per research protocol on 10/9, consistent with response to treatment. To be repeated every 4 weeks     Reports stable fatigue, skin rash, on doxycycline. Mostly facial rash. Has noticed vaginal yeast infection, mild.      No recurrence of abdominal pain. No further infusion reactions. Has stable fatigue, rash, insomnia.     Noted Grave's disease history in , treated with LEE, now on  synthroid. Recent increase in the dose of levothyroxine.     Oncology History:  She completed adjuvant FOLFOX in November 2020. She tolerated only 4 cycles of FOLFOX before she developed an infusion reaction to oxaliplatin in cycle 5 was well as cycle 6. She then completed 6 cycles of infusional 5 FU.     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 She was offered second line therapy with FOLFIRI and Avastin.      She presented to the ED on 11/26 with left flank pain. CT renal stone study showed Moderate hydronephrosis on the left secondary to 3 mm calculus at the UPJ.    She had a PET scan mid April that showed possible progression of her disease with      She has been to [facility] for another opinion. No change was recommended in systemic therapy but she was offered surgical removal of the aorta caval nodes.  Recent sans at [facility]  show stable disease.      Surgery done 7/12/22. Received 2 more cycle of FOLFIRI without Avastin.     She had repeat imaging at [facility] on 9/24/22 that unfortunately showed disease progression since her surgery with multiple RP nodes and one mediastinal node.       PET 12/8/22  Impression:     1. Progression of disease with interval increase of abdominal and pelvic lymphadenopathy.  2. New area of moderate FDG uptake at the right half of the T9 vertebral body (image 124).  Favor degenerative disc changes.  No underlying osteolytic or osteoblastic lesion.  Recommend continued attention on follow-up.  3. No other evidence of FDG avid disease.     12/22/22  Impression After scan comparison:     1. Metastatic portacaval and left periaortic retroperitoneal lymph nodes which remain stable between the CT of 09/24/2022 and the subsequent PET-CT of 12/08/2022.  There is no evidence of progression of metastatic disease.  2. Nonobstructing bilateral renal calculi and cholelithiasis.    2/10/22:  CT chest abd pelvis  Impression:     Stable periportal, retroperitoneal and mesenteric  lymphadenopathy compared to PET-CT 12/08/2022.     Stable right middle lobe 3 mm pulmonary nodule.  No new or enlarging pulmonary nodule.     Hepatic steatosis.  Hepatosplenomegaly.     Cholelithiasis.     Bilateral nonobstructing nephrolithiasis.     Small hiatal hernia with retained contrast in the distal esophagus.  Correlate for reflux.    She had virtual visit at [facility] on 2/17/23.     She has continued FOLFIRI and Avastin.     CT abd pelvis   5/7/23  Impression:     1. Mesenteric inflammatory changes have worsened since the prior study.  2. Mesenteric, retroperitoneal and left-sided pelvic enlarged lymph nodes are unchanged.     8/11/23  CT chest abd pelvis   Impression:     1. Patient with a history of colon adenocarcinoma with worsening periaortic, retroperitoneal, mesenteric, and iliac chain lymphadenopathy the in the abdomen and pelvis when compared with the previous study suggesting worsening metastatic disease.  2. Moderate left hydronephrosis and proximal left hydroureter to the level of possible retroperitoneal scarring.  Invasion/compression of the left ureter is not excluded.  3. Bilateral nephrolithiasis  4. Hepatomegaly and hepatic steatosis  5. Two tiny < 5 mm pulmonary nodules within the chest, unchanged.  No new pulmonary nodules.    10/9/23  CT chest abd pelvis     Impression:     Decreased size of retroperitoneal and mesenteric lymph nodes.  Stable left external iliac lymph node. Right upper lobe pulmonary nodule appears slightly increased in size.  No new pulmonary nodules.  Findings suggest mixed/partial response to therapy.     Cholelithiasis.     Hepatosplenomegaly.     Small pericardial effusion.     RECIST SUMMARY:     Date of prior examination for comparison: 08/11/2023     Lesion 1: Retroperitoneal soft tissue nodule but in the duodenum.  1.7 cm. Series 3 image 93.  Prior measurement 82.1 cm.     Lesion 2: Celiac axis lymph node.  1.5 cm. Series 3 image 56.  Prior measurement 1.8 cm.      Lesion 3: Portacaval lymph node.  1.0 cm. Series 3 image 59.  Prior measurement 1.2 cm.     Lesion 4: Mesenteric lymph node.  1.2 cm. Series 3 image 105.  Prior measurement 1.6 cm.       Oncology History:  Oncology History   Malignant neoplasm of sigmoid colon   3/16/2020 Initial Diagnosis    Malignant neoplasm of sigmoid colon     3/31/2020 Cancer Staged    Staging form: Colon and Rectum, AJCC 8th Edition  - Clinical stage from 3/31/2020: Stage IIIC (cT4b, cN2a, cM0)     5/6/2020 - 11/17/2020 Chemotherapy    Treatment Summary   Plan Name: OP FOLFOX 6 Q2W  Treatment Goal: Curative  Status: Inactive  Start Date: 5/6/2020  End Date: 11/6/2020  Provider: Dylan Leyva MD  Chemotherapy: fluorouraciL injection 945 mg, 400 mg/m2 = 945 mg, Intravenous, Clinic/[facility] 1 time, 14 of 14 cycles  Administration: 945 mg (5/6/2020), 945 mg (5/20/2020), 945 mg (6/3/2020), 945 mg (6/17/2020), 945 mg (7/29/2020), 945 mg (8/12/2020), 945 mg (8/26/2020), 945 mg (9/9/2020), 945 mg (9/23/2020), 945 mg (10/6/2020), 945 mg (10/21/2020), 945 mg (11/4/2020)  fluorouraciL 2,400 mg/m2 = 5,665 mg in sodium chloride 0.9% 240 mL chemo infusion, 2,400 mg/m2 = 5,665 mg, Intravenous, Over 46 hours, 14 of 14 cycles  Administration: 5,665 mg (5/6/2020), 5,665 mg (5/20/2020), 5,665 mg (6/3/2020), 5,665 mg (6/17/2020), 5,665 mg (7/29/2020), 5,665 mg (8/12/2020), 5,665 mg (8/26/2020), 5,665 mg (9/9/2020), 5,665 mg (9/23/2020), 5,665 mg (10/6/2020), 5,665 mg (10/21/2020), 5,665 mg (11/4/2020)  leucovorin calcium 900 mg in dextrose 5 % 250 mL infusion, 945 mg, Intravenous, [facility] time, 14 of 14 cycles  Administration: 900 mg (5/6/2020), 900 mg (5/20/2020), 900 mg (6/3/2020), 900 mg (6/17/2020), 945 mg (6/30/2020), 945 mg (7/20/2020), 945 mg (7/29/2020), 945 mg (8/12/2020), 945 mg (8/26/2020), 945 mg (9/9/2020), 945 mg (9/23/2020), 945 mg (10/6/2020), 945 mg (10/21/2020), 945 mg (11/4/2020)  oxaliplatin (ELOXATIN) 200 mg in dextrose 5 % 500 mL chemo  infusion, 201 mg, Intravenous, Clinic/[facility] 1 time, 6 of 6 cycles  Dose modification: 65 mg/m2 (original dose 85 mg/m2, Cycle 6)  Administration: 200 mg (5/6/2020), 200 mg (5/20/2020), 200 mg (6/3/2020), 200 mg (6/17/2020), 201 mg (6/30/2020), 150 mg (7/20/2020)     5/3/2021 Cancer Staged    Staging form: Colon and Rectum, AJCC 8th Edition  - Clinical stage from 5/3/2021: Stage Unknown (rcT0, cNX, cM1)     6/16/2021 - 8/2/2023 Chemotherapy    Treatment Summary   Plan Name: OP COLORECTAL FOLFIRI + BEVACIZUMAB Q2W  Treatment Goal: Control  Status: Inactive  Start Date: 6/16/2021  End Date: 8/2/2023  Provider: Marah Santo MD  Chemotherapy: fluorouraciL injection 990 mg, 400 mg/m2 = 990 mg, Intravenous, Clinic/[facility] 1 time, 15 of 15 cycles  Administration: 990 mg (6/16/2021), 990 mg (6/30/2021), 990 mg (7/14/2021), 980 mg (7/28/2021), 990 mg (8/11/2021), 990 mg (8/25/2021), 990 mg (9/8/2021), 990 mg (9/22/2021), 990 mg (10/6/2021), 990 mg (10/20/2021), 990 mg (11/3/2021), 990 mg (12/1/2021), 990 mg (12/15/2021), 990 mg (11/17/2021), 990 mg (12/28/2021)  fluorouraciL 2,400 mg/m2 = 5,950 mg in sodium chloride 0.9% 240 mL chemo infusion, 2,400 mg/m2 = 5,950 mg, Intravenous, Over 46 hours, 43 of 46 cycles  Administration: 5,950 mg (6/16/2021), 5,950 mg (6/30/2021), 5,950 mg (7/14/2021), 5,880 mg (7/28/2021), 5,930 mg (8/11/2021), 5,930 mg (8/25/2021), 5,930 mg (9/8/2021), 5,930 mg (9/22/2021), 5,930 mg (10/6/2021), 5,930 mg (10/20/2021), 5,930 mg (11/3/2021), 5,930 mg (12/1/2021), 5,930 mg (12/15/2021), 5,930 mg (11/17/2021), 5,930 mg (12/28/2021), 6,050 mg (5/11/2022), 6,025 mg (3/9/2022), 6,025 mg (2/23/2022), 5,930 mg (2/2/2022), 5,930 mg (1/19/2022), 6,050 mg (4/20/2022), 6,025 mg (4/6/2022), 6,025 mg (3/23/2022), 6,050 mg (6/1/2022), 6,050 mg (6/15/2022), 6,050 mg (10/19/2022), 6,050 mg (10/5/2022), 6,050 mg (11/2/2022), 6,050 mg (11/16/2022), 6,050 mg (11/30/2022), 6,050 mg (1/4/2023), 6,050 mg (12/19/2022), 6,050 mg  (1/18/2023), 6,000 mg (5/22/2023), 6,000 mg (4/26/2023), 6,000 mg (4/11/2023), 6,000 mg (2/28/2023), 6,050 mg (2/14/2023), 6,000 mg (2/1/2023), 6,000 mg (7/5/2023), 6,000 mg (6/19/2023), 6,000 mg (6/5/2023), 6,000 mg (7/31/2023)  bevacizumab (AVASTIN) 600 mg in sodium chloride 0.9% 100 mL chemo infusion, 660 mg, Intravenous, [facility] 1 time, 36 of 36 cycles  Dose modification: 5 mg/kg (original dose 5 mg/kg, Cycle 26, Reason: MD Discretion, Comment: 5 mg/kg original dose)  Administration: 600 mg (6/30/2021), 600 mg (7/14/2021), 600 mg (7/28/2021), 600 mg (6/16/2021), 600 mg (8/11/2021), 655 mg (8/25/2021), 600 mg (9/8/2021), 600 mg (9/22/2021), 600 mg (10/6/2021), 600 mg (10/20/2021), 600 mg (11/3/2021), 655 mg (12/1/2021), 600 mg (12/15/2021), 600 mg (11/17/2021), 600 mg (12/28/2021), 600 mg (5/11/2022), 600 mg (3/9/2022), 600 mg (2/23/2022), 600 mg (2/2/2022), 600 mg (1/19/2022), 600 mg (4/20/2022), 680 mg (4/6/2022), 600 mg (3/23/2022), 680 mg (10/5/2022), 680 mg (10/19/2022), 680 mg (11/2/2022), 680 mg (11/16/2022), 680 mg (11/30/2022), 680 mg (1/4/2023), 680 mg (12/19/2022), 680 mg (1/18/2023), 680 mg (3/28/2023), 680 mg (3/14/2023), 680 mg (2/28/2023), 680 mg (2/14/2023), 680 mg (2/1/2023)  irinotecan (CAMPTOSAR) 440 mg in sodium chloride 0.9% 500 mL chemo infusion, 446 mg, Intravenous, Clinic/[facility] 1 time, 45 of 48 cycles  Dose modification: 180 mg/m2 (original dose 180 mg/m2, Cycle 24)  Administration: 440 mg (6/16/2021), 440 mg (6/30/2021), 440 mg (7/14/2021), 440 mg (7/28/2021), 440 mg (8/11/2021), 444 mg (8/25/2021), 440 mg (9/8/2021), 440 mg (9/22/2021), 440 mg (10/6/2021), 440 mg (10/20/2021), 440 mg (11/3/2021), 440 mg (12/1/2021), 440 mg (12/15/2021), 440 mg (11/17/2021), 440 mg (12/28/2021), 440 mg (5/11/2022), 440 mg (3/9/2022), 440 mg (2/23/2022), 440 mg (2/2/2022), 440 mg (1/19/2022), 440 mg (4/20/2022), 440 mg (4/6/2022), 440 mg (3/24/2022), 440 mg (6/1/2022), 440 mg (6/15/2022), 440 mg  (10/19/2022), 440 mg (10/5/2022), 440 mg (11/2/2022), 440 mg (11/16/2022), 440 mg (11/30/2022), 440 mg (1/4/2023), 440 mg (12/19/2022), 440 mg (1/18/2023), 440 mg (5/22/2023), 440 mg (4/26/2023), 440 mg (4/11/2023), 440 mg (3/28/2023), 440 mg (3/14/2023), 440 mg (2/28/2023), 440 mg (2/14/2023), 440 mg (2/1/2023), 440 mg (7/5/2023), 440 mg (6/19/2023), 440 mg (6/5/2023), 440 mg (7/31/2023)  bevacizumab-awwb (MVASI) 5 mg/kg = 680 mg in sodium chloride 0.9% 100 mL infusion, 5 mg/kg = 680 mg (100 % of original dose 5 mg/kg), Intravenous, Clinic/[facility] 1 time, 7 of 10 cycles  Dose modification: 5 mg/kg (original dose 5 mg/kg, Cycle 39)  Administration: 680 mg (4/11/2023), 680 mg (4/26/2023), 680 mg (5/22/2023), 680 mg (7/5/2023), 680 mg (6/19/2023), 680 mg (6/5/2023), 680 mg (7/31/2023)     8/17/2023 - 8/18/2023 Chemotherapy    Treatment Summary   Plan Name: OP CETUXIMAB 500MG Q2W  Treatment Goal: Control  Status: Inactive  Start Date:   End Date:   Provider: Marah Santo MD  Chemotherapy: [No matching medication found in this treatment plan]     8/24/2023 -  Chemotherapy    Treatment Summary   Plan Name: [facility] - ARM 1 ENCORAFENIB  CETUXIMAB NIVOLUMAB  Treatment Goal: Palliative  Status: Active  Start Date: 8/24/2023  End Date: 7/12/2024 (Planned)  Provider: Marah Santo MD  Chemotherapy: cetuximab (ERBITUX) 100 mg/50 mL chemo infusion 1,200 mg, 1,230 mg, Intravenous, Clinic/[facility] 1 time, 3 of 12 cycles  Administration: 1,200 mg (8/24/2023), 1,200 mg (9/8/2023), 1,200 mg (9/22/2023), 1,200 mg (10/6/2023), 1,200 mg (10/20/2023), 1,200 mg (11/3/2023)  encorafenib 75 mg Cap, 300 mg, Oral, Daily, 1 of 1 cycle, Start date: --, End date: --     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   4/3/2020 Initial Diagnosis    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     5/6/2020 - 11/17/2020 Chemotherapy    Treatment Summary   Plan Name: OP FOLFOX 6 Q2W  Treatment Goal: Curative  Status: Inactive  Start Date: 5/6/2020  End Date:  11/6/2020  Provider: Dylan Leyva MD  Chemotherapy: fluorouraciL injection 945 mg, 400 mg/m2 = 945 mg, Intravenous, Clinic/[facility] 1 time, 14 of 14 cycles  Administration: 945 mg (5/6/2020), 945 mg (5/20/2020), 945 mg (6/3/2020), 945 mg (6/17/2020), 945 mg (7/29/2020), 945 mg (8/12/2020), 945 mg (8/26/2020), 945 mg (9/9/2020), 945 mg (9/23/2020), 945 mg (10/6/2020), 945 mg (10/21/2020), 945 mg (11/4/2020)  fluorouraciL 2,400 mg/m2 = 5,665 mg in sodium chloride 0.9% 240 mL chemo infusion, 2,400 mg/m2 = 5,665 mg, Intravenous, Over 46 hours, 14 of 14 cycles  Administration: 5,665 mg (5/6/2020), 5,665 mg (5/20/2020), 5,665 mg (6/3/2020), 5,665 mg (6/17/2020), 5,665 mg (7/29/2020), 5,665 mg (8/12/2020), 5,665 mg (8/26/2020), 5,665 mg (9/9/2020), 5,665 mg (9/23/2020), 5,665 mg (10/6/2020), 5,665 mg (10/21/2020), 5,665 mg (11/4/2020)  leucovorin calcium 900 mg in dextrose 5 % 250 mL infusion, 945 mg, Intravenous, Clinic/[facility] 1 time, 14 of 14 cycles  Administration: 900 mg (5/6/2020), 900 mg (5/20/2020), 900 mg (6/3/2020), 900 mg (6/17/2020), 945 mg (6/30/2020), 945 mg (7/20/2020), 945 mg (7/29/2020), 945 mg (8/12/2020), 945 mg (8/26/2020), 945 mg (9/9/2020), 945 mg (9/23/2020), 945 mg (10/6/2020), 945 mg (10/21/2020), 945 mg (11/4/2020)  oxaliplatin (ELOXATIN) 200 mg in dextrose 5 % 500 mL chemo infusion, 201 mg, Intravenous, Clinic/[facility] 1 time, 6 of 6 cycles  Dose modification: 65 mg/m2 (original dose 85 mg/m2, Cycle 6)  Administration: 200 mg (5/6/2020), 200 mg (5/20/2020), 200 mg (6/3/2020), 200 mg (6/17/2020), 201 mg (6/30/2020), 150 mg (7/20/2020)     6/16/2021 - 8/2/2023 Chemotherapy    Treatment Summary   Plan Name: OP COLORECTAL FOLFIRI + BEVACIZUMAB Q2W  Treatment Goal: Control  Status: Inactive  Start Date: 6/16/2021  End Date: 8/2/2023  Provider: Marah Santo MD  Chemotherapy: fluorouraciL injection 990 mg, 400 mg/m2 = 990 mg, Intravenous, Clinic/[facility] 1 time, 15 of 15 cycles  Administration: 990 mg (6/16/2021),  990 mg (6/30/2021), 990 mg (7/14/2021), 980 mg (7/28/2021), 990 mg (8/11/2021), 990 mg (8/25/2021), 990 mg (9/8/2021), 990 mg (9/22/2021), 990 mg (10/6/2021), 990 mg (10/20/2021), 990 mg (11/3/2021), 990 mg (12/1/2021), 990 mg (12/15/2021), 990 mg (11/17/2021), 990 mg (12/28/2021)  fluorouraciL 2,400 mg/m2 = 5,950 mg in sodium chloride 0.9% 240 mL chemo infusion, 2,400 mg/m2 = 5,950 mg, Intravenous, Over 46 hours, 43 of 46 cycles  Administration: 5,950 mg (6/16/2021), 5,950 mg (6/30/2021), 5,950 mg (7/14/2021), 5,880 mg (7/28/2021), 5,930 mg (8/11/2021), 5,930 mg (8/25/2021), 5,930 mg (9/8/2021), 5,930 mg (9/22/2021), 5,930 mg (10/6/2021), 5,930 mg (10/20/2021), 5,930 mg (11/3/2021), 5,930 mg (12/1/2021), 5,930 mg (12/15/2021), 5,930 mg (11/17/2021), 5,930 mg (12/28/2021), 6,050 mg (5/11/2022), 6,025 mg (3/9/2022), 6,025 mg (2/23/2022), 5,930 mg (2/2/2022), 5,930 mg (1/19/2022), 6,050 mg (4/20/2022), 6,025 mg (4/6/2022), 6,025 mg (3/23/2022), 6,050 mg (6/1/2022), 6,050 mg (6/15/2022), 6,050 mg (10/19/2022), 6,050 mg (10/5/2022), 6,050 mg (11/2/2022), 6,050 mg (11/16/2022), 6,050 mg (11/30/2022), 6,050 mg (1/4/2023), 6,050 mg (12/19/2022), 6,050 mg (1/18/2023), 6,000 mg (5/22/2023), 6,000 mg (4/26/2023), 6,000 mg (4/11/2023), 6,000 mg (2/28/2023), 6,050 mg (2/14/2023), 6,000 mg (2/1/2023), 6,000 mg (7/5/2023), 6,000 mg (6/19/2023), 6,000 mg (6/5/2023), 6,000 mg (7/31/2023)  bevacizumab (AVASTIN) 600 mg in sodium chloride 0.9% 100 mL chemo infusion, 660 mg, Intravenous, [facility] 1 time, 36 of 36 cycles  Dose modification: 5 mg/kg (original dose 5 mg/kg, Cycle 26, Reason: MD Discretion, Comment: 5 mg/kg original dose)  Administration: 600 mg (6/30/2021), 600 mg (7/14/2021), 600 mg (7/28/2021), 600 mg (6/16/2021), 600 mg (8/11/2021), 655 mg (8/25/2021), 600 mg (9/8/2021), 600 mg (9/22/2021), 600 mg (10/6/2021), 600 mg (10/20/2021), 600 mg (11/3/2021), 655 mg (12/1/2021), 600 mg (12/15/2021), 600 mg (11/17/2021), 600  mg (12/28/2021), 600 mg (5/11/2022), 600 mg (3/9/2022), 600 mg (2/23/2022), 600 mg (2/2/2022), 600 mg (1/19/2022), 600 mg (4/20/2022), 680 mg (4/6/2022), 600 mg (3/23/2022), 680 mg (10/5/2022), 680 mg (10/19/2022), 680 mg (11/2/2022), 680 mg (11/16/2022), 680 mg (11/30/2022), 680 mg (1/4/2023), 680 mg (12/19/2022), 680 mg (1/18/2023), 680 mg (3/28/2023), 680 mg (3/14/2023), 680 mg (2/28/2023), 680 mg (2/14/2023), 680 mg (2/1/2023)  irinotecan (CAMPTOSAR) 440 mg in sodium chloride 0.9% 500 mL chemo infusion, 446 mg, Intravenous, Clinic/[facility] 1 time, 45 of 48 cycles  Dose modification: 180 mg/m2 (original dose 180 mg/m2, Cycle 24)  Administration: 440 mg (6/16/2021), 440 mg (6/30/2021), 440 mg (7/14/2021), 440 mg (7/28/2021), 440 mg (8/11/2021), 444 mg (8/25/2021), 440 mg (9/8/2021), 440 mg (9/22/2021), 440 mg (10/6/2021), 440 mg (10/20/2021), 440 mg (11/3/2021), 440 mg (12/1/2021), 440 mg (12/15/2021), 440 mg (11/17/2021), 440 mg (12/28/2021), 440 mg (5/11/2022), 440 mg (3/9/2022), 440 mg (2/23/2022), 440 mg (2/2/2022), 440 mg (1/19/2022), 440 mg (4/20/2022), 440 mg (4/6/2022), 440 mg (3/24/2022), 440 mg (6/1/2022), 440 mg (6/15/2022), 440 mg (10/19/2022), 440 mg (10/5/2022), 440 mg (11/2/2022), 440 mg (11/16/2022), 440 mg (11/30/2022), 440 mg (1/4/2023), 440 mg (12/19/2022), 440 mg (1/18/2023), 440 mg (5/22/2023), 440 mg (4/26/2023), 440 mg (4/11/2023), 440 mg (3/28/2023), 440 mg (3/14/2023), 440 mg (2/28/2023), 440 mg (2/14/2023), 440 mg (2/1/2023), 440 mg (7/5/2023), 440 mg (6/19/2023), 440 mg (6/5/2023), 440 mg (7/31/2023)  bevacizumab-awwb (MVASI) 5 mg/kg = 680 mg in sodium chloride 0.9% 100 mL infusion, 5 mg/kg = 680 mg (100 % of original dose 5 mg/kg), Intravenous, Clinic/[facility] 1 time, 7 of 10 cycles  Dose modification: 5 mg/kg (original dose 5 mg/kg, Cycle 39)  Administration: 680 mg (4/11/2023), 680 mg (4/26/2023), 680 mg (5/22/2023), 680 mg (7/5/2023), 680 mg (6/19/2023), 680 mg (6/5/2023), 680 mg (7/31/2023)      2023 - 2023 Chemotherapy    Treatment Summary   Plan Name: OP CETUXIMAB 500MG Q2W  Treatment Goal: Control  Status: Inactive  Start Date:   End Date:   Provider: Marah Santo MD  Chemotherapy: [No matching medication found in this treatment plan]     2023 -  Chemotherapy    Treatment Summary   Plan Name: [facility] - ARM 1 ENCORAFENIB  CETUXIMAB NIVOLUMAB  Treatment Goal: Palliative  Status: Active  Start Date: 2023  End Date: 2024 (Planned)  Provider: Marah Santo MD  Chemotherapy: cetuximab (ERBITUX) 100 mg/50 mL chemo infusion 1,200 mg, 1,230 mg, Intravenous, Clinic/[facility] 1 time, 3 of 12 cycles  Administration: 1,200 mg (2023), 1,200 mg (2023), 1,200 mg (2023), 1,200 mg (10/6/2023), 1,200 mg (10/20/2023), 1,200 mg (11/3/2023)  encorafenib 75 mg Cap, 300 mg, Oral, Daily, 1 of 1 cycle, Start date: --, End date: --         History:  Past Medical History:   Diagnosis Date    Anxiety     Depression     FH: ovarian cancer 3/16/2020    Hx of psychiatric care     Effexor, Paxil, Lexapro, Zoloft, Wellbutrin, Trazodone Buspar    Hyperthyroidism     Hypothyroid     Kidney calculi     Malignant neoplasm of sigmoid colon 3/16/2020    Menorrhagia     Multinodular goiter 2012    Palpitation     Psychiatric problem     Venous insufficiency       Past Surgical History:   Procedure Laterality Date     SECTION, CLASSIC      x3    COLONOSCOPY N/A 2020    Procedure: COLONOSCOPY;  Surgeon: Shane Parker MD;  Location: [facility] ENDO;  Service: Endoscopy;  Laterality: N/A;    COLONOSCOPY N/A 2022    Procedure: COLONOSCOPY;  Surgeon: Shane Parker MD;  Location: [facility] ENDO;  Service: Endoscopy;  Laterality: N/A;    COLONOSCOPY N/A 2023    Procedure: COLONOSCOPY;  Surgeon: Shane Parker MD;  Location: [facility] ENDO;  Service: Endoscopy;  Laterality: N/A;  no cecum anastomosis    CYSTOSCOPY W/ URETERAL STENT PLACEMENT Bilateral 3/25/2020    Procedure:  CYSTOSCOPY, WITH URETERAL STENT INSERTION;  Surgeon: Claudio Tyson MD;  [facility];  Service: Urology;  Laterality: Bilateral;    INSERTION OF TUNNELED CENTRAL VENOUS CATHETER (CVC) WITH SUBCUTANEOUS PORT N/A 5/1/2020    Procedure: [id number];  [facility];  [facility];  Service: Radiology;  Laterality: N/A;  Room 189/Cindy    LAPAROSCOPIC SIGMOIDECTOMY N/A 3/25/2020    Procedure: COLECTOMY, SIGMOID, LAPAROSCOPIC, flex sig, ERAS high;  Surgeon: Silvio Man MD;  [facility];  Service: Colon and Rectal;  Laterality: N/A;    MOBILIZATION OF SPLENIC FLEXURE  3/25/2020    Procedure: MOBILIZATION, SPLENIC FLEXURE;  Surgeon: Silvio Man MD;  [facility];  Service: Colon and Rectal;;    tonsillectomy      TOTAL ABDOMINAL HYSTERECTOMY W/ BILATERAL SALPINGOOPHORECTOMY N/A 3/25/2020    Procedure: HYSTERECTOMY, TOTAL, ABDOMINAL, WITH BILATERAL SALPINGO-OOPHORECTOMY;  Surgeon: Keron Brady MD;  [facility];  Service: Oncology;  Laterality: N/A;     Family History   Problem Relation Age of Onset    Heart disease Father     Diabetes Mother 65    Drug abuse Brother     Drug abuse Brother     Cancer Maternal Aunt         lung cancer    Cancer Maternal Grandmother         stomach cancer- started in ovaries    Ovarian cancer Maternal Grandmother         stomach cancer- started in ovaries    Colon cancer Paternal Uncle     Cancer Paternal Uncle     Ovarian cancer Maternal Aunt     Ovarian cancer Maternal Aunt     Ovarian cancer Cousin         mother had ovarian cancer    Drug abuse Son     Drug abuse Son         clean/sober since 2012    Colon cancer Son     No Known Problems Daughter     Uterine cancer Neg Hx     Breast cancer Neg Hx      Social History     Tobacco Use    Smoking status: Never    Smokeless tobacco: Never   Substance and Sexual Activity    Alcohol use: Yes     Comment: occasionally- twice  "monthly    Drug use: Never    Sexual activity: Yes     Partners: Male     Birth control/protection: See Surgical Hx        ROS:   Review of Systems   Constitutional:  Positive for malaise/fatigue. Negative for fever and weight loss.   HENT:  Negative for congestion, hearing loss, nosebleeds and sore throat.    Eyes:  Negative for double vision and photophobia.   Respiratory:  Negative for cough, hemoptysis, sputum production, shortness of breath and wheezing.    Cardiovascular:  Negative for chest pain, palpitations, orthopnea and leg swelling.   Gastrointestinal:  Positive for nausea (improving). Negative for abdominal pain, blood in stool, constipation, diarrhea, heartburn and vomiting.   Genitourinary:  Negative for dysuria, frequency, hematuria and urgency.   Musculoskeletal:  Negative for back pain, joint pain and myalgias.   Skin:  Positive for rash. Negative for itching.   Neurological:  Negative for dizziness, tingling, seizures, weakness and headaches.   Endo/Heme/Allergies:  Negative for polydipsia. Does not bruise/bleed easily.   Psychiatric/Behavioral:  Negative for depression and memory loss. The patient is nervous/anxious. The patient does not have insomnia.         Objective:     Vitals:    11/16/23 0907   BP: 109/75   Pulse: 90   Resp: 16   Temp: 97.4 °F (36.3 °C)   TempSrc: Temporal   SpO2: 97%   Weight: 129.9 kg (286 lb 6 oz)   Height: 5' 6" (1.676 m)   PainSc: 0-No pain       Wt Readings from Last 10 Encounters:   11/16/23 129.9 kg (286 lb 6 oz)   11/03/23 130 kg (286 lb 9.6 oz)   11/02/23 130 kg (286 lb 9.6 oz)   10/30/23 128 kg (282 lb 3 oz)   10/20/23 128 kg (282 lb 3 oz)   10/19/23 128 kg (282 lb 3 oz)   10/18/23 129.2 kg (284 lb 13.4 oz)   10/06/23 128.9 kg (284 lb 2.8 oz)   10/05/23 128.9 kg (284 lb 2.8 oz)   09/22/23 127.5 kg (281 lb 1.4 oz)       Physical Examination:   Physical Exam  Vitals and nursing note reviewed.   Constitutional:       General: She is not in acute distress.     " Appearance: She is not diaphoretic.   HENT:      Head: Normocephalic.      Mouth/Throat:      Pharynx: No oropharyngeal exudate.   Eyes:      General: No scleral icterus.     Conjunctiva/sclera: Conjunctivae normal.   Neck:      Thyroid: No thyromegaly.   Cardiovascular:      Rate and Rhythm: Normal rate and regular rhythm.      Heart sounds: Normal heart sounds. No murmur heard.  Pulmonary:      Effort: Pulmonary effort is normal. No respiratory distress.      Breath sounds: No stridor. No wheezing or rales.   Chest:      Chest wall: No tenderness.   Abdominal:      General: Bowel sounds are normal. There is no distension.      Palpations: Abdomen is soft. There is no mass.      Tenderness: There is no abdominal tenderness. There is no rebound.   Musculoskeletal:         General: No tenderness or deformity. Normal range of motion.      Cervical back: Neck supple.   Lymphadenopathy:      Cervical: No cervical adenopathy.   Skin:     General: Skin is warm and dry.      Findings: Rash (grade 1 facial rash, at baseline) present. No erythema.   Neurological:      Mental Status: She is alert and oriented to person, place, and time.      Cranial Nerves: No cranial nerve deficit.      Coordination: Coordination normal.      Gait: Gait is intact.   Psychiatric:         Mood and Affect: Affect normal.         Cognition and Memory: Memory normal.         Judgment: Judgment normal.          Diagnostic Tests:  Significant Imaging: I have reviewed and interpreted all pertinent imaging results/findings.  Laboratory Data:  All pertinent labs have been reviewed.    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 Oxaliplatin was stopped due to severe adverse reaction.     Follow-up CTs and PET in May 2021 showed enlarging retroperitoneal node, concerning for metastatic disease.      She started FOLFIRI + Avastin and has continued till July 2022.   Given isolated RP toni disease, she has undergone open Left periaortic and aortocaval lymph node dissection on 7/12/2022. Resumed FOLFIRI+ Debo with relatively stable disease but now has disease progression.     She has been enrolled into  SWOG 2107 (encorafenib/cetuximab + nivo) , cleared for cycle 4, D1. On additional pre medications given infusion reaction to Cetuximab, continue to monitor closely.     Scans reviewed, responsive disease.  Follow up per research protocol.     Grade 1 dermatitis   Noted, continue topical hydrocortisone and clindamycin gel, remains on oral doxycycline.     Neoplasm pain  Improving. Follow up with palliative care.     Hydronephrosis   Follow up with Urology for co management. No acute intervention was recommended.     Nausea   Continue compazine. Continue sacuso patch as needed.     Constipation  Start sennakot and daily Miralax.     Transaminitis  Fluctuates.  Continue to monitor. No obvious liver mets.     Hypercalcemia   Mild, secondary to hyperparathyroidism.  Avoid calcium supplements. Continue Endocrine follow up.     Hypothyroidism  Multinodular goiter   Continue Levothyroxine. Recent dose adjustment noted.   Had a non diagnostic biopsy in 2012, usg findings have remained stable. Reviewed repeat thyroid US, stable, will follow up with Endocrine.     Essential HTN   Continue  antihypertensives.      Anxiety   Continue to  follow up with Onc psych.    Vaginal candidosis   One dose of Fluconazole prescribed.     MDM includes  :    - Acute or chronic illness or injury that poses a threat to life or bodily function  - Independent review and explanation of 3+ results from unique tests  - Discussion of management and ordering 3+ unique tests  - Extensive discussion of treatment and management  - Prescription drug management  - Drug therapy requiring intensive monitoring for toxicity          ECOG SCORE               Discussion:   No follow-ups on file.    Plan was discussed with the patient at length, and she verbalized understanding. Gail was given an opportunity to ask questions that were answered to her satisfaction, and she was advised to call in the interval if any problems or questions arise.    Electronically signed by Marah Santo MD        Route Chart for Scheduling    Med Onc Chart Routing      Follow up with physician . Scheduled   Follow up with TAVO    Infusion scheduling note    Injection scheduling note    Labs    Imaging    Pharmacy appointment    Other referrals                  Treatment Plan Information   [facility] - ARM 1 ENCORAFENIB  CETUXIMAB NIVOLUMAB   Marah Santo MD   Upcoming Treatment Dates - [facility] - ARM 1 ENCORAFENIB  CETUXIMAB NIVOLUMAB    11/17/2023       Pre-Medications       ondansetron injection 8 mg       hydrocortisone sodium succinate injection 50 mg       diphenhydrAMINE (BENADRYL) 50 mg in NS 50 mL IVPB       acetaminophen tablet 650 mg       Chemotherapy       cetuximab (ERBITUX) 100 mg/50 mL chemo infusion 1,230 mg       INV nivolumab 480 mg in INV sodium chloride 0.9 % 100 mL chemo infusion  12/1/2023       Pre-Medications       ondansetron injection 8 mg       hydrocortisone sodium succinate injection 50 mg       diphenhydrAMINE (BENADRYL) 50 mg in NS 50 mL IVPB       acetaminophen tablet 650 mg       Chemotherapy       cetuximab (ERBITUX)  100 mg/50 mL chemo infusion 1,230 mg  12/15/2023       Pre-Medications       ondansetron injection 8 mg       hydrocortisone sodium succinate injection 50 mg       diphenhydrAMINE (BENADRYL) 50 mg in NS 50 mL IVPB       acetaminophen tablet 650 mg       Chemotherapy       cetuximab (ERBITUX) 100 mg/50 mL chemo infusion 1,230 mg       INV nivolumab 480 mg in INV sodium chloride 0.9 % 100 mL chemo infusion  12/29/2023       Pre-Medications       ondansetron injection 8 mg       hydrocortisone sodium succinate injection 50 mg       diphenhydrAMINE (BENADRYL) 50 mg in NS 50 mL IVPB       acetaminophen tablet 650 mg       Chemotherapy       cetuximab (ERBITUX) 100 mg/50 mL chemo infusion 1,230 mg    Supportive Plan Information  IV FLUIDS AND ELECTROLYTES   Brayden Solo MD   Upcoming Treatment Dates - IV FLUIDS AND ELECTROLYTES    No upcoming days in selected categories.    Therapy Plan Information  PORT FLUSH  Flushes  heparin, porcine (PF) 100 unit/mL injection flush 500 Units  500 Units, Intravenous, Every visit  sodium chloride 0.9% flush 10 mL  10 mL, Intravenous, Every visit

## 2023-11-16 NOTE — TELEPHONE ENCOUNTER
Pt advised and verb understanding.  Will be waiting to hear back after Dr. Moss speaks with Dr. Santo.

## 2023-11-16 NOTE — TELEPHONE ENCOUNTER
I spoke with the patient about getting an IO appt scheduled per referral. I explained in detail what we offer and listed some symptoms/side effects that we can help address using our support services. They verbalized understanding and accepted a date/time. Location was reviewed and a message was sent to the portal if they had any follow up questions.

## 2023-11-16 NOTE — TELEPHONE ENCOUNTER
Let her know that her labs show primary hyperparathyroidism. I sent a message to her oncologist. After I discuss with them, can determine next steps

## 2023-11-17 ENCOUNTER — INFUSION (OUTPATIENT)
Dept: INFUSION THERAPY | Facility: HOSPITAL | Age: 53
End: 2023-11-17
Attending: INTERNAL MEDICINE
Payer: MEDICAID

## 2023-11-17 ENCOUNTER — RESEARCH ENCOUNTER (OUTPATIENT)
Dept: RESEARCH | Facility: HOSPITAL | Age: 53
End: 2023-11-17
Payer: MEDICAID

## 2023-11-17 ENCOUNTER — DOCUMENTATION ONLY (OUTPATIENT)
Dept: INFUSION THERAPY | Facility: HOSPITAL | Age: 53
End: 2023-11-17
Payer: MEDICAID

## 2023-11-17 ENCOUNTER — OFFICE VISIT (OUTPATIENT)
Dept: HEMATOLOGY/ONCOLOGY | Facility: CLINIC | Age: 53
End: 2023-11-17
Payer: MEDICAID

## 2023-11-17 VITALS
RESPIRATION RATE: 18 BRPM | BODY MASS INDEX: 46.02 KG/M2 | SYSTOLIC BLOOD PRESSURE: 134 MMHG | HEIGHT: 66 IN | HEART RATE: 107 BPM | WEIGHT: 286.38 LBS | DIASTOLIC BLOOD PRESSURE: 67 MMHG

## 2023-11-17 DIAGNOSIS — C78.6 SECONDARY MALIGNANT NEOPLASM OF RETROPERITONEUM: ICD-10-CM

## 2023-11-17 DIAGNOSIS — C18.7 MALIGNANT NEOPLASM OF SIGMOID COLON: Primary | ICD-10-CM

## 2023-11-17 DIAGNOSIS — T45.1X5A CHEMOTHERAPY-INDUCED FATIGUE: ICD-10-CM

## 2023-11-17 DIAGNOSIS — Z00.6 EXAMINATION OF PARTICIPANT IN CLINICAL TRIAL: Primary | ICD-10-CM

## 2023-11-17 DIAGNOSIS — R53.83 CHEMOTHERAPY-INDUCED FATIGUE: ICD-10-CM

## 2023-11-17 DIAGNOSIS — M54.50 CHRONIC MIDLINE LOW BACK PAIN WITHOUT SCIATICA: Primary | ICD-10-CM

## 2023-11-17 DIAGNOSIS — C77.2 METASTASIS TO INTESTINAL LYMPH NODE: ICD-10-CM

## 2023-11-17 DIAGNOSIS — G89.29 CHRONIC MIDLINE LOW BACK PAIN WITHOUT SCIATICA: Primary | ICD-10-CM

## 2023-11-17 PROCEDURE — 1160F RVW MEDS BY RX/DR IN RCRD: CPT | Mod: CPTII,,, | Performed by: NURSE PRACTITIONER

## 2023-11-17 PROCEDURE — 96367 TX/PROPH/DG ADDL SEQ IV INF: CPT | Mod: PN

## 2023-11-17 PROCEDURE — 1160F PR REVIEW ALL MEDS BY PRESCRIBER/CLIN PHARMACIST DOCUMENTED: ICD-10-PCS | Mod: CPTII,,, | Performed by: NURSE PRACTITIONER

## 2023-11-17 PROCEDURE — 99999 PR PBB SHADOW E&M-EST. PATIENT-LVL II: CPT | Mod: PBBFAC,,, | Performed by: NURSE PRACTITIONER

## 2023-11-17 PROCEDURE — 96417 CHEMO IV INFUS EACH ADDL SEQ: CPT | Mod: PN

## 2023-11-17 PROCEDURE — 99215 OFFICE O/P EST HI 40 MIN: CPT | Mod: S$PBB,,, | Performed by: NURSE PRACTITIONER

## 2023-11-17 PROCEDURE — 96413 CHEMO IV INFUSION 1 HR: CPT | Mod: PN

## 2023-11-17 PROCEDURE — 96415 CHEMO IV INFUSION ADDL HR: CPT | Mod: PN

## 2023-11-17 PROCEDURE — 99212 OFFICE O/P EST SF 10 MIN: CPT | Mod: PBBFAC,PN,25 | Performed by: NURSE PRACTITIONER

## 2023-11-17 PROCEDURE — 99999 PR PBB SHADOW E&M-EST. PATIENT-LVL II: ICD-10-PCS | Mod: PBBFAC,,, | Performed by: NURSE PRACTITIONER

## 2023-11-17 PROCEDURE — 25000003 PHARM REV CODE 250: Mod: PN | Performed by: INTERNAL MEDICINE

## 2023-11-17 PROCEDURE — 4010F ACE/ARB THERAPY RXD/TAKEN: CPT | Mod: CPTII,,, | Performed by: NURSE PRACTITIONER

## 2023-11-17 PROCEDURE — 4010F PR ACE/ARB THEARPY RXD/TAKEN: ICD-10-PCS | Mod: CPTII,,, | Performed by: NURSE PRACTITIONER

## 2023-11-17 PROCEDURE — 96375 TX/PRO/DX INJ NEW DRUG ADDON: CPT | Mod: PN

## 2023-11-17 PROCEDURE — 99215 PR OFFICE/OUTPT VISIT, EST, LEVL V, 40-54 MIN: ICD-10-PCS | Mod: S$PBB,,, | Performed by: NURSE PRACTITIONER

## 2023-11-17 PROCEDURE — 1159F MED LIST DOCD IN RCRD: CPT | Mod: CPTII,,, | Performed by: NURSE PRACTITIONER

## 2023-11-17 PROCEDURE — 1159F PR MEDICATION LIST DOCUMENTED IN MEDICAL RECORD: ICD-10-PCS | Mod: CPTII,,, | Performed by: NURSE PRACTITIONER

## 2023-11-17 PROCEDURE — 63600175 PHARM REV CODE 636 W HCPCS: Mod: PN | Performed by: INTERNAL MEDICINE

## 2023-11-17 RX ORDER — ACETAMINOPHEN 325 MG/1
650 TABLET ORAL
Status: COMPLETED | OUTPATIENT
Start: 2023-11-17 | End: 2023-11-17

## 2023-11-17 RX ORDER — HEPARIN 100 UNIT/ML
500 SYRINGE INTRAVENOUS
Status: DISCONTINUED | OUTPATIENT
Start: 2023-11-17 | End: 2023-11-17 | Stop reason: HOSPADM

## 2023-11-17 RX ORDER — ONDANSETRON 2 MG/ML
8 INJECTION INTRAMUSCULAR; INTRAVENOUS
Status: COMPLETED | OUTPATIENT
Start: 2023-11-17 | End: 2023-11-17

## 2023-11-17 RX ADMIN — Medication 500 UNITS: at 03:11

## 2023-11-17 RX ADMIN — CETUXIMAB 1200 MG: 2 SOLUTION INTRAVENOUS at 10:11

## 2023-11-17 RX ADMIN — ONDANSETRON 8 MG: 2 INJECTION INTRAMUSCULAR; INTRAVENOUS at 08:11

## 2023-11-17 RX ADMIN — ACETAMINOPHEN 650 MG: 325 TABLET, FILM COATED ORAL at 08:11

## 2023-11-17 RX ADMIN — DIPHENHYDRAMINE HYDROCHLORIDE 50 MG: 50 INJECTION, SOLUTION INTRAMUSCULAR; INTRAVENOUS at 09:11

## 2023-11-17 RX ADMIN — SODIUM CHLORIDE: 9 INJECTION, SOLUTION INTRAVENOUS at 08:11

## 2023-11-17 RX ADMIN — HYDROCORTISONE SODIUM SUCCINATE 50 MG: 100 INJECTION, POWDER, FOR SOLUTION INTRAMUSCULAR; INTRAVENOUS at 09:11

## 2023-11-17 NOTE — PROGRESS NOTES
"Gail Mckinnon  53 y.o. is here to seek an integrative approach to discuss side effects related to colon cancer treatment. Gail Mckinnon  was referred by Dr. Santo     HPI  Mrs. Mckinnon reports she went for a colonoscopy in 2020 and was diagnosed with colon cancer with mets. She had a complete hysterectomy and a colon resection and and has been on chemotherapy since then. She is on cycle 4 of this trial. She states she was very sick with the previous chemo, but she is much better on this regimen. She is not sleeping very well, but does take Remeron as needed. She does not have a good appetite most of the time although her taste is returning which is helpful. She does drink an Ensure daily. She does feel like her appetite is increasing on this treatment. She tries to be active around the house, but easily fatigues. She states, "I feel like it is a cycle of not doing anything from surgery and treatment that is causing the fatigue." She states, "It is horrible to feel this way as I am usually so active."She reports her stress is better now than previously, but she does have moderate stress. She is learning coping techniques which helps. She states she also stresses regarding finances and possibly finding a part time job. She is seeing Dr. Batres for psychology and she reports this is helpful She has had low back pain for about 3 years. She is unable to stand for long periods without pain.   She id  and has 3 grown children. 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 She has a good support system.     7 pillars Assessment    Sleep  How many hours of sleep per night? 3-4 hours of broken sleep  Do you have trouble falling asleep, staying asleep or waking up earlier than you need to? yes  Do you have daytime fatigue? yes  Do you need medication for sleep? yes Remeron  Do you use any supplements or other interventions for sleep? no    Resilience  Rate " your current level of stress- moderate/high  How do you manage stress?  Cry, breathing exercises, goes into her room    Purpose  Do you feel you have a vision or a life purpose? Yes    Environment  Any exposures:no known exposures    Spirituality-  Religious    Nutrition   Food allergies or sensitivities: no  Do you adhere to a particular type of diet? no  Do you have any concerns with your eating habits? no    Exercise  How would you describe your physical activity level? Low    Past Medical History  Past Medical History:   Diagnosis Date    Anxiety     Depression     FH: ovarian cancer 3/16/2020    Hx of psychiatric care     Effexor, Paxil, Lexapro, Zoloft, Wellbutrin, Trazodone Buspar    Hyperthyroidism     Hypothyroid     Kidney calculi     Malignant neoplasm of sigmoid colon 3/16/2020    Menorrhagia     Multinodular goiter 2012    Palpitation     Psychiatric problem     Venous insufficiency       Past Surgical History   Past Surgical History:   Procedure Laterality Date     SECTION, CLASSIC      x3    COLONOSCOPY N/A 2020    Procedure: COLONOSCOPY;  Surgeon: Shane Parker MD;  Location: [facility];  Service: Endoscopy;  Laterality: N/A;    COLONOSCOPY N/A 2022    Procedure: COLONOSCOPY;  Surgeon: Shane Parker MD;  Location: [facility];  Service: Endoscopy;  Laterality: N/A;    COLONOSCOPY N/A 2023    Procedure: COLONOSCOPY;  Surgeon: Shane Parker MD;  [facility];  Service: Endoscopy;  Laterality: N/A;  no cecum anastomosis    CYSTOSCOPY W/ URETERAL STENT PLACEMENT Bilateral 3/25/2020    Procedure: CYSTOSCOPY, WITH URETERAL STENT INSERTION;  Surgeon: Claudio Tyson MD;  Location: [address];  Service: Urology;  Laterality: Bilateral;    INSERTION OF TUNNELED CENTRAL VENOUS CATHETER (CVC) WITH SUBCUTANEOUS PORT N/A 2020    Procedure: [id number];  Surgeon: Dosc Diagnostic Provider;  Location: [facility] OR [address] [place] [address];  Service: Radiology;   Laterality: N/A;  Room 189/Cindy    LAPAROSCOPIC SIGMOIDECTOMY N/A 3/25/2020    Procedure: COLECTOMY, SIGMOID, LAPAROSCOPIC, flex sig, ERAS high;  Surgeon: Silvio Man MD;  Location: [facility] OR 2ND FLR;  Service: Colon and Rectal;  Laterality: N/A;    MOBILIZATION OF SPLENIC FLEXURE  3/25/2020    Procedure: MOBILIZATION, SPLENIC FLEXURE;  Surgeon: Silvio Man MD;  Location: [facility] OR 2ND FLR;  Service: Colon and Rectal;;    tonsillectomy      TOTAL ABDOMINAL HYSTERECTOMY W/ BILATERAL SALPINGOOPHORECTOMY N/A 3/25/2020    Procedure: HYSTERECTOMY, TOTAL, ABDOMINAL, WITH BILATERAL SALPINGO-OOPHORECTOMY;  Surgeon: Keron Brady MD;  Location: [facility] OR 2ND FLR;  Service: Oncology;  Laterality: N/A;      Family History   Family History   Problem Relation Age of Onset    Heart disease Father     Diabetes Mother 65    Drug abuse Brother     Drug abuse Brother     Cancer Maternal Aunt         lung cancer    Cancer Maternal Grandmother         stomach cancer- started in ovaries    Ovarian cancer Maternal Grandmother         stomach cancer- started in ovaries    Colon cancer Paternal Uncle     Cancer Paternal Uncle     Ovarian cancer Maternal Aunt     Ovarian cancer Maternal Aunt     Ovarian cancer Cousin         mother had ovarian cancer    Drug abuse Son     Drug abuse Son         clean/sober since 2012    Colon cancer Son     No Known Problems Daughter     Uterine cancer Neg Hx     Breast cancer Neg Hx       Allergies  Review of patient's allergies indicates:   Allergen Reactions    Oxaliplatin Other (See Comments)     Itchy hands, throat closed up, trouble hearing - during infusion    Penicillins Hives and Rash     childhood allergy  childhood allergy  unknown      Current Medications:    Current Outpatient Medications:     acetaminophen (TYLENOL) 500 MG tablet, Take 2 tablets (1,000 mg total) by mouth every 8 (eight) hours., Disp: 60 tablet, Rfl: 0    buPROPion (WELLBUTRIN SR) 150 MG TBSR 12 hr tablet,  Take 1 tablet (150 mg total) by mouth 2 (two) times daily., Disp: 180 tablet, Rfl: 0    clindamycin phosphate 1% (CLINDAGEL) 1 % gel, Apply topically 2 (two) times daily., Disp: 60 g, Rfl: 3    doxycycline (VIBRA-TABS) 100 MG tablet, Take 1 tablet (100 mg total) by mouth 2 (two) times daily., Disp: 60 tablet, Rfl: 3    DULoxetine (CYMBALTA) 30 MG capsule, Take 2 capsules (60 mg total) by mouth once daily., Disp: 60 capsule, Rfl: 0    encorafenib 75 mg Cap, Take 4 tablets (300 mg total) by mouth once daily., Disp: 120 capsule, Rfl: 0    ergocalciferol (ERGOCALCIFEROL) 50,000 unit Cap, Take 1 capsule (50,000 Units total) by mouth every 7 days., Disp: 4 capsule, Rfl: 2    fluconazole (DIFLUCAN) 150 MG Tab, Take 1 tablet (150 mg total) by mouth once. for 1 dose, Disp: 1 tablet, Rfl: 3    granisetron (SANCUSO) 3.1 mg/24 hour, Place 1 patch (3.1 mg total) onto the skin every 7 days AS DIRECTED., Disp: 4 patch, Rfl: 3    Lactobacillus rhamnosus GG (CULTURELLE) 10 billion cell capsule, Take 1 capsule by mouth once daily., Disp: , Rfl:     levothyroxine (SYNTHROID) 150 MCG tablet, Take 2 tablets (300 mcg total) by mouth before breakfast., Disp: 60 tablet, Rfl: 11    LIDOcaine-prilocaine (EMLA) cream, Apply topically as needed (30 to 60 min prior chemo or port use)., Disp: 30 g, Rfl: 3    LORazepam (ATIVAN) 1 MG tablet, Take 1 tablet (1 mg total) by mouth every 12 (twelve) hours as needed for Anxiety (nausea)., Disp: 30 tablet, Rfl: 0    magic mouthwash diphen/antac/lidoc/nysta, Swish and swallow 5 mL every 4 hours as needed for mouth pain/ulcers, Disp: 120 mL, Rfl: 2    mirtazapine (REMERON) 7.5 MG Tab, Take 1 tablet (7.5 mg total) by mouth every evening., Disp: 30 tablet, Rfl: 0    multivitamin (THERAGRAN) per tablet, Take 1 tablet by mouth once daily., Disp: , Rfl:     olmesartan (BENICAR) 20 MG tablet, Take 1 tablet (20 mg total) by mouth once daily., Disp: 30 tablet, Rfl: 5    ondansetron (ZOFRAN-ODT) 8 MG TbDL, Take 1  tablet (8 mg total) by mouth every 8 (eight) hours as needed., Disp: 60 tablet, Rfl: 3    oxyCODONE-acetaminophen (PERCOCET)  mg per tablet, Take 1 tablet by mouth every 6 (six) hours as needed for Pain., Disp: 60 tablet, Rfl: 0    prochlorperazine (COMPAZINE) 10 MG tablet, Take 1 tablet (10 mg total) by mouth every 6 (six) hours as needed., Disp: 30 tablet, Rfl: 6    senna-docusate 8.6-50 mg (PERICOLACE) 8.6-50 mg per tablet, Take 2 tablets by mouth 2 (two) times daily., Disp: , Rfl:   No current facility-administered medications for this visit.    Facility-Administered Medications Ordered in Other Visits:     cetuximab (ERBITUX) 100 mg/50 mL chemo infusion 1,200 mg, 1,200 mg, Intravenous, 1 time in Clinic/HOD, Marah Santo MD, Last Rate: 150 mL/hr at 11/17/23 1009, 1,200 mg at 11/17/23 1009    heparin, porcine (PF) 100 unit/mL injection flush 500 Units, 500 Units, Intravenous, PRN, Marah Santo MD    INV nivolumab 480 mg in INV sodium chloride 0.9 % 100 mL chemo infusion, 480 mg, Intravenous, 1 time in Clinic/HOD, Marah Santo MD     Review of Systems  Review of Systems   Constitutional:  Positive for malaise/fatigue.   HENT: Negative.     Eyes: Negative.    Respiratory: Negative.     Cardiovascular: Negative.    Gastrointestinal: Negative.    Genitourinary: Negative.    Musculoskeletal:  Positive for back pain.   Skin: Negative.    Neurological: Negative.    Endo/Heme/Allergies: Negative.    Psychiatric/Behavioral:  The patient is nervous/anxious and has insomnia.       Physical Exam      /59  Temp 97.4    RR 16    Physical Exam  Vitals reviewed.   Constitutional:       Appearance: Normal appearance.   Neurological:      Mental Status: She is alert.   Psychiatric:         Mood and Affect: Mood normal.         Behavior: Behavior normal.        ASSESSMENT :  1. Chronic midline low back pain without sciatica    2. Secondary malignant neoplasm of retroperitoneum    3.  Chemotherapy-induced fatigue      PLAN:  Reviewed all information discussed at today's visit and all questions were answered.    Counseled on healthy lifestyle and behavior modifications   Referral to PT I discussed the importance of therapy and explained the physical therapists will work with you to develop a specialized treatment program to help reduce and improve cancer-related problems and improve your strength and function.   Continue Oncology Psychology  See nutrition during treatment as needed  I discussed and recommended the following support services:  [employer] [employer] and [employer] I suggested [place] [employer] and/or Yoga as these practices reduce stress, increases flexibility and muscle strength, improves balance and promotes serenity in the power of movement to help fight disease and boost your immune system.   Music and relaxation therapy and Meditation which can decrease stress by lowering blood pressure, slowing breathing, and helping you be more present in the moment. It improves sleep by relaxing the body and mind at the end of the day.Meditation also trains you how to focus on one thing at a time, improving concentration. It also promotes emotional well-being by decreasing depression and anxiety, and helping create a more positive outlook on life.    Follow up with Integrative Services in 1 month    I spent a total of 40 minutes on the day of the visit.This includes face to face time and non-face to face time preparing to see the patient (eg, review of tests), obtaining and/or reviewing separately obtained history, documenting clinical information in the electronic or other health record, independently interpreting results and communicating results to the patient/family/caregiver, or care coordinator.

## 2023-11-17 NOTE — PROGRESS NOTES
Gail Mckinnon, MRN [id number]  PID# [id number]     Protocol: SWOG   IRB#: 2023.047  : NGUYEN Degroot MD  Treating Investigator: JESUS Santo MD    November 17th, 2023     Randomized Phase II Trial of Encorafenib and Cetuximab with or Without Nivolumab (NSC #[id number]) for Patients with Previously Treated, Microsatellite Stable BRAF V600E Metastatic and/or Unresectable Colorectal Cancer   Cycle 1 Day 15 Arm 1/ Nivolumab + Cetuximab + Encorafenib therapy.     C4 Day 1 November 17th, 2023.      The patient presented to the [facility] 3rd floor infusion center this am for cycle 4 Day 1 protocol treatment. - MAR attached. Vital signs WNL  The patient was seen by the treating MD yesterday and deemed eligible for C4 D1     The patient was alert, oriented, without noted distress, with appropriate mood and affect for the situation, and freely agreed to continue with participating in the referenced study and consented to blood specimens for banking.  The patient denies any changes in health or medications since yesterday's office visit with Dr. Santo.  [id number] pre-tx labs: CBC w/ diff, CMP, Magnesium resulted and reviewed by this CRC and the treating MD.   Endocrinology following TSH for patient's baseline Grade II Hypothyroidism (resulted from treatment for Hyperthyroidism). See communication from study chair regarding TSH monitoring for this patient in Cycle 1 section of shadow chart.   See source doc communications concerning elevated [id number] TSH with WNL Free T4 with Dr. Arechiga, Study Chair, in Cycle 3 Day 15 section.  Per Section 7.4- Administration of cetuximab will be dosed every 14 days (+/- 7 days).      This CRC reviewed upcoming appointments with the patient and encouraged to call with any new symptoms or issues, the patient verbalized understanding. Patient made aware to call after hour number, that she agrees to having, if after business hours or over the weekend.     Per Infusion nurse, the patient  tolerated C4D1 treatment without any noted adverse events.     Next protocol required bio-specimen (Whole Blood) due at progression of disease. (Section 9.0 Study Calendar)     Next Every 8-week CT C/A/P (Section 7.5): 12/04/2023 @ 0930 (patient informed)     Cycle 4 Day 15:     [id number] [facility] floor lab @ 08:15   [id number] OV with Dr. Santo @ 11:00   [id number] Infusion at 08:30        CHANDRIKA Adams RN

## 2023-11-17 NOTE — PROGRESS NOTES
10:05 AM  This LCSW met this pt to provide a gas card. The pt was in good spirits and reported no needs at this time.

## 2023-11-20 ENCOUNTER — TELEPHONE (OUTPATIENT)
Dept: HEMATOLOGY/ONCOLOGY | Facility: CLINIC | Age: 53
End: 2023-11-20
Payer: MEDICAID

## 2023-11-22 ENCOUNTER — OFFICE VISIT (OUTPATIENT)
Dept: ENDOCRINOLOGY | Facility: CLINIC | Age: 53
End: 2023-11-22
Payer: MEDICAID

## 2023-11-22 VITALS
HEIGHT: 66 IN | BODY MASS INDEX: 45.91 KG/M2 | SYSTOLIC BLOOD PRESSURE: 102 MMHG | DIASTOLIC BLOOD PRESSURE: 68 MMHG | OXYGEN SATURATION: 96 % | HEART RATE: 115 BPM | WEIGHT: 285.69 LBS

## 2023-11-22 DIAGNOSIS — E03.8 OTHER SPECIFIED HYPOTHYROIDISM: ICD-10-CM

## 2023-11-22 DIAGNOSIS — E21.0 PRIMARY HYPERPARATHYROIDISM: Primary | ICD-10-CM

## 2023-11-22 DIAGNOSIS — E04.2 MULTINODULAR GOITER: ICD-10-CM

## 2023-11-22 PROCEDURE — 3008F PR BODY MASS INDEX (BMI) DOCUMENTED: ICD-10-PCS | Mod: CPTII,,, | Performed by: INTERNAL MEDICINE

## 2023-11-22 PROCEDURE — 3074F SYST BP LT 130 MM HG: CPT | Mod: CPTII,,, | Performed by: INTERNAL MEDICINE

## 2023-11-22 PROCEDURE — 3078F DIAST BP <80 MM HG: CPT | Mod: CPTII,,, | Performed by: INTERNAL MEDICINE

## 2023-11-22 PROCEDURE — 3074F PR MOST RECENT SYSTOLIC BLOOD PRESSURE < 130 MM HG: ICD-10-PCS | Mod: CPTII,,, | Performed by: INTERNAL MEDICINE

## 2023-11-22 PROCEDURE — 99214 OFFICE O/P EST MOD 30 MIN: CPT | Mod: S$PBB,,, | Performed by: INTERNAL MEDICINE

## 2023-11-22 PROCEDURE — 99214 PR OFFICE/OUTPT VISIT, EST, LEVL IV, 30-39 MIN: ICD-10-PCS | Mod: S$PBB,,, | Performed by: INTERNAL MEDICINE

## 2023-11-22 PROCEDURE — 1160F PR REVIEW ALL MEDS BY PRESCRIBER/CLIN PHARMACIST DOCUMENTED: ICD-10-PCS | Mod: CPTII,,, | Performed by: INTERNAL MEDICINE

## 2023-11-22 PROCEDURE — 99214 OFFICE O/P EST MOD 30 MIN: CPT | Mod: PBBFAC,PO | Performed by: INTERNAL MEDICINE

## 2023-11-22 PROCEDURE — 1159F PR MEDICATION LIST DOCUMENTED IN MEDICAL RECORD: ICD-10-PCS | Mod: CPTII,,, | Performed by: INTERNAL MEDICINE

## 2023-11-22 PROCEDURE — 4010F PR ACE/ARB THEARPY RXD/TAKEN: ICD-10-PCS | Mod: CPTII,,, | Performed by: INTERNAL MEDICINE

## 2023-11-22 PROCEDURE — 99999 PR PBB SHADOW E&M-EST. PATIENT-LVL IV: CPT | Mod: PBBFAC,,, | Performed by: INTERNAL MEDICINE

## 2023-11-22 PROCEDURE — 1160F RVW MEDS BY RX/DR IN RCRD: CPT | Mod: CPTII,,, | Performed by: INTERNAL MEDICINE

## 2023-11-22 PROCEDURE — 4010F ACE/ARB THERAPY RXD/TAKEN: CPT | Mod: CPTII,,, | Performed by: INTERNAL MEDICINE

## 2023-11-22 PROCEDURE — 1159F MED LIST DOCD IN RCRD: CPT | Mod: CPTII,,, | Performed by: INTERNAL MEDICINE

## 2023-11-22 PROCEDURE — 99999 PR PBB SHADOW E&M-EST. PATIENT-LVL IV: ICD-10-PCS | Mod: PBBFAC,,, | Performed by: INTERNAL MEDICINE

## 2023-11-22 PROCEDURE — 3008F BODY MASS INDEX DOCD: CPT | Mod: CPTII,,, | Performed by: INTERNAL MEDICINE

## 2023-11-22 PROCEDURE — 3078F PR MOST RECENT DIASTOLIC BLOOD PRESSURE < 80 MM HG: ICD-10-PCS | Mod: CPTII,,, | Performed by: INTERNAL MEDICINE

## 2023-11-22 NOTE — PROGRESS NOTES
CHIEF COMPLAINT: Hypothyroidism  53 y.o.  being seen as a f/u. She has a history of Graves' disease which has been treated. Subsequently developed hypothyroidism.     On synthroid 300 daily. .  No XRT to head/neck. No difficulty swallowing. Had a PET CT showing increased metabolism on left thyroid nodule. No Palpitations. No tremors. No increased anxiety. Insomnia somewhat better. Fatigue improved with higher dose.     Has metastatic colon cancer. Had surgery done in 2022 that showed LN + for Ca. On Opdivo and Erbitux.       Has had consistent hypercalcemia since approx 2023. Prior to that had occasional episodes.   Has had kidney stones with last passed stone 2019. No thiazide or lithium. No Tums. Not currently taking Vit D. No N/V. No fractures.          Left Thyroid FNA 12- non diagnostic.             PAST MEDICAL HISTORY: Hypothyroidism after treatment for Graves' disease. 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 There is heart disease in the family     MEDICATIONS/ALLERGIES: The patient's MedCard has been updated and reviewed.     PE:         Latest Reference Range & Units 23 08:23   Sodium 136 - 145 mmol/L 140   Potassium 3.5 - 5.1 mmol/L 4.1   Chloride 95 - 110 mmol/L 111 (H)   CO2 23 - 29 mmol/L 22 (L)   Anion Gap 8 - 16 mmol/L 7 (L)   BUN 6 - 20 mg/dL 10   Creatinine 0.5 - 1.4 mg/dL 0.8   eGFR >60 mL/min/1.73 m^2 >60.0   Glucose 70 - 110 mg/dL 106   Calcium 8.7 - 10.5 mg/dL 10.9 (H)   Magnesium  1.6 - 2.6 mg/dL 2.0   ALP 55 - 135 U/L 129   PROTEIN TOTAL 6.0 - 8.4 g/dL 7.1   Albumin 3.5 - 5.2 g/dL 3.6   (H): Data is abnormally high  (L): Data is abnormally low   Latest Reference Range & Units 23 09:14   TSH 0.400 - 4.000 uIU/mL 27.392 (H)   Free T4 0.71 - 1.51 ng/dL 0.90   (H): Data is abnormally high   Latest Reference Range & Units 23 08:43   CREATININE,  URINE (SEND OUT) 15.0 - 325.0 mg/dL 66.0   Calcium, Urine 0.0 - 15.0 mg/dL 18.9 (H)   Calcium, 24 Hr Urine 4 - 12 mg/Hr 17 (H)   Calcium, Urine (mg/spec) mg/Spec 397   Creatinine, Urinr (mg/spec) mg/Spec 1386.0   Creatinine, Timed Urine 40.0 - 75.0 mg/Hr 57.8   (H): Data is abnormally high    US SOFT TISSUE HEAD NECK THYROID     CLINICAL HISTORY:  Hypercalcemia     TECHNIQUE:  Ultrasound of the thyroid     COMPARISON:  05/19/2023     FINDINGS:  The right lobe of thyroid gland measures 3.5 x 1.2 x 1.3 cm in size.  The left lobe the thyroid gland measures 3.1 x 1.3 x 1.0cm in size.  This gives an approximate volume of on the right of 2.9cc and an approximate volume on the left of 2.2 cc for a total approximate volume of 5.1 cc.     A nodule is noted in the mid right lobe of the thyroid gland of 6 mm without worrisome change since prior with shadowing suggestive wall calcification.     A 9 mm nodule is noted within the mid left lobe of the thyroid gland without worrisome change since the prior that appears predominantly solid.  This is wider than tall on slightly hypoechoic well-circumscribed and free from detrimental change without obvious microcalcifications     One or 2 normal size nodes are noted on this examination in the right cervical chain.     The thyroid gland appears small and heterogeneous diffusely, please correlate for history of injury, ablation, thyroiditis or prior injury.     Impression:     1. Small thyroid nodules without worrisome change since prior with no new nodules meeting criteria for fine-needle aspiration or biopsy.  2. Small heterogeneous thyroid gland as can be seen with a history of thyroiditis or injury/ablation.  Please correlate         ASSESSMENT/PLAN:  1. Hypothyroidism- .  There was some confusion about her dosage of medication.  Now taking Synthroid 300 mcg daily.  Appears to be taking appropriately.  Scheduled for repeat TSH 12/13/23.       2. Multinodular Goiter- No XRT.   Recently had a PET-CT showing a hypermetabolic left thyroid nodule.  Max SUV 13.6.  She did have a biopsy in 2012, however was nondiagnostic.  Reviewed ultrasound imaging dating back to 2013 and is not changed in size in 10 years.  Repeat ultrasound done in 2023 and no significant change.  Considering patient currently in treatment for metastatic colon cancer, can just monitor with thyroid ultrasound.  If any significant change, could readdress next steps.    3. Morbid Obesity- exercise limited.  Currently getting chemotherapy.    4. Primary hyperparathyroidism-labs suggestive of primary hyperparathyroidism.  Discussed case with oncology.  Currently getting treatment for metastatic colon cancer.  At this time would not pursue surgery is that could impact cancer treatment.  Can treat medically if needed.  Currently calcium at an acceptable level.  If increases significantly, could try starting Sensipar to lower serum calcium.  Will check a bone density to assess whether she needs bisphosphonate therapy..     5. Vit D Def- take Ergocalciferol 50,000 units weekly x 3 months and then take Over the counter Vit D 2000 IU daily.        FOLLOWUP  DEXA- hip, spine, radius  F/U 6 months with Renal Panel, PTH, TSH, Thyroid Ultrasound.

## 2023-11-29 NOTE — PROGRESS NOTES
PSYCHO-ONCOLOGY NOTE/ Individual Psychotherapy     Date: 11/30/2023   Site:  [facility]      Therapeutic Intervention: Met with patient.  Outpatient - Behavior modifying psychotherapy 60 min - CPT code 54814 and Outpatient - Supportive psychotherapy 60 min - CPT Code 24339    This includes face to face time and non-face to face time preparing to see the patient, obtaining and/or reviewing separately obtained history, documenting clinical information in the electronic or other health record, independently interpreting results and communicating results to the patient/family/caregiver, or care coordinator.      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    Insomnia    Insomnia    Other constipation    Nausea and vomiting    Mucositis due to chemotherapy    Morbid obesity    Secondary adenocarcinoma of lymph node    Thyroid nodule    Hypercalcemia    Transaminitis    Hypophosphatemia    Functional diarrhea    Primary hypertension    Diarrhea of presumed infectious origin    Calculus of gallbladder without cholecystitis without obstruction    ACP (advance care planning)    Abdominal pain    Shortness of breath    Secondary malignant neoplasm of retroperitoneum    Depressive disorder    Hypothyroidism    Immunodeficiency due to chemotherapy    Acneiform rash    Chronic midline low back pain without sciatica    Chemotherapy-induced fatigue       Chief complaint/reason for encounter: depression   Met with patient to evaluate psychosocial adaptation to diagnosis/treatment/survivorship of colon cancer    Current Medications  Current Outpatient Medications   Medication     acetaminophen (TYLENOL) 500 MG tablet    buPROPion (WELLBUTRIN SR) 150 MG TBSR 12 hr tablet    clindamycin phosphate 1% (CLINDAGEL) 1 % gel    doxycycline (VIBRA-TABS) 100 MG tablet    DULoxetine (CYMBALTA) 30 MG capsule    encorafenib 75 mg Cap    ergocalciferol (ERGOCALCIFEROL) 50,000 unit Cap    granisetron (SANCUSO) 3.1 mg/24 hour    Lactobacillus rhamnosus GG (CULTURELLE) 10 billion cell capsule    levothyroxine (SYNTHROID) 150 MCG tablet    LIDOcaine-prilocaine (EMLA) cream    LORazepam (ATIVAN) 1 MG tablet    magic mouthwash diphen/antac/lidoc/nysta    mirtazapine (REMERON) 7.5 MG Tab    multivitamin (THERAGRAN) per tablet    olmesartan (BENICAR) 20 MG tablet    ondansetron (ZOFRAN-ODT) 8 MG TbDL    oxyCODONE-acetaminophen (PERCOCET)  mg per tablet    prochlorperazine (COMPAZINE) 10 MG tablet    senna-docusate 8.6-50 mg (PERICOLACE) 8.6-50 mg per tablet     No current facility-administered medications for this visit.       ONCOLOGY HISTORY  Oncology History   Malignant neoplasm of sigmoid colon   3/16/2020 Initial Diagnosis    Malignant neoplasm of sigmoid colon     3/31/2020 Cancer Staged    Staging form: Colon and Rectum, AJCC 8th Edition  - Clinical stage from 3/31/2020: Stage IIIC (cT4b, cN2a, cM0)     5/6/2020 - 11/17/2020 Chemotherapy    Treatment Summary   Plan Name: OP FOLFOX 6 Q2W  Treatment Goal: Curative  Status: Inactive  Start Date: 5/6/2020  End Date: 11/6/2020  Provider: Dylan Leyva MD  Chemotherapy: fluorouraciL injection 945 mg, 400 mg/m2 = 945 mg, Intravenous, [facility] 1 time, 14 of 14 cycles  Administration: 945 mg (5/6/2020), 945 mg (5/20/2020), 945 mg (6/3/2020), 945 mg (6/17/2020), 945 mg (7/29/2020), 945 mg (8/12/2020), 945 mg (8/26/2020), 945 mg (9/9/2020), 945 mg (9/23/2020), 945 mg (10/6/2020), 945 mg (10/21/2020), 945 mg (11/4/2020)  fluorouraciL 2,400 mg/m2 = 5,665 mg in sodium chloride 0.9% 240 mL chemo infusion, 2,400 mg/m2 = 5,665 mg, Intravenous, Over 46 hours, 14  of 14 cycles  Administration: 5,665 mg (5/6/2020), 5,665 mg (5/20/2020), 5,665 mg (6/3/2020), 5,665 mg (6/17/2020), 5,665 mg (7/29/2020), 5,665 mg (8/12/2020), 5,665 mg (8/26/2020), 5,665 mg (9/9/2020), 5,665 mg (9/23/2020), 5,665 mg (10/6/2020), 5,665 mg (10/21/2020), 5,665 mg (11/4/2020)  leucovorin calcium 900 mg in dextrose 5 % 250 mL infusion, 945 mg, Intravenous, [facility] 1 time, 14 of 14 cycles  Administration: 900 mg (5/6/2020), 900 mg (5/20/2020), 900 mg (6/3/2020), 900 mg (6/17/2020), 945 mg (6/30/2020), 945 mg (7/20/2020), 945 mg (7/29/2020), 945 mg (8/12/2020), 945 mg (8/26/2020), 945 mg (9/9/2020), 945 mg (9/23/2020), 945 mg (10/6/2020), 945 mg (10/21/2020), 945 mg (11/4/2020)  oxaliplatin (ELOXATIN) 200 mg in dextrose 5 % 500 mL chemo infusion, 201 mg, Intravenous, [facility] 1 time, 6 of 6 cycles  Dose modification: 65 mg/m2 (original dose 85 mg/m2, Cycle 6)  Administration: 200 mg (5/6/2020), 200 mg (5/20/2020), 200 mg (6/3/2020), 200 mg (6/17/2020), 201 mg (6/30/2020), 150 mg (7/20/2020)     5/3/2021 Cancer Staged    Staging form: Colon and Rectum, AJCC 8th Edition  - Clinical stage from 5/3/2021: Stage Unknown (rcT0, cNX, cM1)     6/16/2021 - 8/2/2023 Chemotherapy    Treatment Summary   Plan Name: OP COLORECTAL FOLFIRI + BEVACIZUMAB Q2W  Treatment Goal: Control  Status: Inactive  Start Date: 6/16/2021  End Date: 8/2/2023  Provider: Marah Santo MD  Chemotherapy: fluorouraciL injection 990 mg, 400 mg/m2 = 990 mg, Intravenous, Clinic/[facility] 1 time, 15 of 15 cycles  Administration: 990 mg (6/16/2021), 990 mg (6/30/2021), 990 mg (7/14/2021), 980 mg (7/28/2021), 990 mg (8/11/2021), 990 mg (8/25/2021), 990 mg (9/8/2021), 990 mg (9/22/2021), 990 mg (10/6/2021), 990 mg (10/20/2021), 990 mg (11/3/2021), 990 mg (12/1/2021), 990 mg (12/15/2021), 990 mg (11/17/2021), 990 mg (12/28/2021)  fluorouraciL 2,400 mg/m2 = 5,950 mg in sodium chloride 0.9% 240 mL chemo infusion, 2,400 mg/m2 = 5,950 mg, Intravenous,  Over 46 hours, 43 of 46 cycles  Administration: 5,950 mg (6/16/2021), 5,950 mg (6/30/2021), 5,950 mg (7/14/2021), 5,880 mg (7/28/2021), 5,930 mg (8/11/2021), 5,930 mg (8/25/2021), 5,930 mg (9/8/2021), 5,930 mg (9/22/2021), 5,930 mg (10/6/2021), 5,930 mg (10/20/2021), 5,930 mg (11/3/2021), 5,930 mg (12/1/2021), 5,930 mg (12/15/2021), 5,930 mg (11/17/2021), 5,930 mg (12/28/2021), 6,050 mg (5/11/2022), 6,025 mg (3/9/2022), 6,025 mg (2/23/2022), 5,930 mg (2/2/2022), 5,930 mg (1/19/2022), 6,050 mg (4/20/2022), 6,025 mg (4/6/2022), 6,025 mg (3/23/2022), 6,050 mg (6/1/2022), 6,050 mg (6/15/2022), 6,050 mg (10/19/2022), 6,050 mg (10/5/2022), 6,050 mg (11/2/2022), 6,050 mg (11/16/2022), 6,050 mg (11/30/2022), 6,050 mg (1/4/2023), 6,050 mg (12/19/2022), 6,050 mg (1/18/2023), 6,000 mg (5/22/2023), 6,000 mg (4/26/2023), 6,000 mg (4/11/2023), 6,000 mg (2/28/2023), 6,050 mg (2/14/2023), 6,000 mg (2/1/2023), 6,000 mg (7/5/2023), 6,000 mg (6/19/2023), 6,000 mg (6/5/2023), 6,000 mg (7/31/2023)  bevacizumab (AVASTIN) 600 mg in sodium chloride 0.9% 100 mL chemo infusion, 660 mg, Intravenous, [facility] 1 time, 36 of 36 cycles  Dose modification: 5 mg/kg (original dose 5 mg/kg, Cycle 26, Reason: MD Discretion, Comment: 5 mg/kg original dose)  Administration: 600 mg (6/30/2021), 600 mg (7/14/2021), 600 mg (7/28/2021), 600 mg (6/16/2021), 600 mg (8/11/2021), 655 mg (8/25/2021), 600 mg (9/8/2021), 600 mg (9/22/2021), 600 mg (10/6/2021), 600 mg (10/20/2021), 600 mg (11/3/2021), 655 mg (12/1/2021), 600 mg (12/15/2021), 600 mg (11/17/2021), 600 mg (12/28/2021), 600 mg (5/11/2022), 600 mg (3/9/2022), 600 mg (2/23/2022), 600 mg (2/2/2022), 600 mg (1/19/2022), 600 mg (4/20/2022), 680 mg (4/6/2022), 600 mg (3/23/2022), 680 mg (10/5/2022), 680 mg (10/19/2022), 680 mg (11/2/2022), 680 mg (11/16/2022), 680 mg (11/30/2022), 680 mg (1/4/2023), 680 mg (12/19/2022), 680 mg (1/18/2023), 680 mg (3/28/2023), 680 mg (3/14/2023), 680 mg (2/28/2023), 680 mg  (2/14/2023), 680 mg (2/1/2023)  irinotecan (CAMPTOSAR) 440 mg in sodium chloride 0.9% 500 mL chemo infusion, 446 mg, Intravenous, [facility] 1 time, 45 of 48 cycles  Dose modification: 180 mg/m2 (original dose 180 mg/m2, Cycle 24)  Administration: 440 mg (6/16/2021), 440 mg (6/30/2021), 440 mg (7/14/2021), 440 mg (7/28/2021), 440 mg (8/11/2021), 444 mg (8/25/2021), 440 mg (9/8/2021), 440 mg (9/22/2021), 440 mg (10/6/2021), 440 mg (10/20/2021), 440 mg (11/3/2021), 440 mg (12/1/2021), 440 mg (12/15/2021), 440 mg (11/17/2021), 440 mg (12/28/2021), 440 mg (5/11/2022), 440 mg (3/9/2022), 440 mg (2/23/2022), 440 mg (2/2/2022), 440 mg (1/19/2022), 440 mg (4/20/2022), 440 mg (4/6/2022), 440 mg (3/24/2022), 440 mg (6/1/2022), 440 mg (6/15/2022), 440 mg (10/19/2022), 440 mg (10/5/2022), 440 mg (11/2/2022), 440 mg (11/16/2022), 440 mg (11/30/2022), 440 mg (1/4/2023), 440 mg (12/19/2022), 440 mg (1/18/2023), 440 mg (5/22/2023), 440 mg (4/26/2023), 440 mg (4/11/2023), 440 mg (3/28/2023), 440 mg (3/14/2023), 440 mg (2/28/2023), 440 mg (2/14/2023), 440 mg (2/1/2023), 440 mg (7/5/2023), 440 mg (6/19/2023), 440 mg (6/5/2023), 440 mg (7/31/2023)  bevacizumab-awwb (MVASI) 5 mg/kg = 680 mg in sodium chloride 0.9% 100 mL infusion, 5 mg/kg = 680 mg (100 % of original dose 5 mg/kg), Intravenous, [facility] 1 time, 7 of 10 cycles  Dose modification: 5 mg/kg (original dose 5 mg/kg, Cycle 39)  Administration: 680 mg (4/11/2023), 680 mg (4/26/2023), 680 mg (5/22/2023), 680 mg (7/5/2023), 680 mg (6/19/2023), 680 mg (6/5/2023), 680 mg (7/31/2023)     8/17/2023 - 8/18/2023 Chemotherapy    Treatment Summary   Plan Name: OP CETUXIMAB 500MG Q2W  Treatment Goal: Control  Status: Inactive  Start Date:   End Date:   Provider: Marah Santo MD  Chemotherapy: [No matching medication found in this treatment plan]     8/24/2023 -  Chemotherapy    Treatment Summary   Plan Name: [facility] - ARM 1 ENCORAFENIB  CETUXIMAB NIVOLUMAB  Treatment Goal:  Palliative  Status: Active  Start Date: 8/24/2023  End Date: 7/12/2024 (Planned)  Provider: Marah Santo MD  Chemotherapy: cetuximab (ERBITUX) 100 mg/50 mL chemo infusion 1,200 mg, 1,230 mg, Intravenous, [facility] 1 time, 4 of 12 cycles  Administration: 1,200 mg (8/24/2023), 1,200 mg (9/8/2023), 1,200 mg (9/22/2023), 1,200 mg (10/6/2023), 1,200 mg (10/20/2023), 1,200 mg (11/3/2023), 1,200 mg (11/17/2023)  encorafenib 75 mg Cap, 300 mg, Oral, Daily, 1 of 1 cycle, Start date: --, End date: --     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   4/3/2020 Initial Diagnosis    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     5/6/2020 - 11/17/2020 Chemotherapy    Treatment Summary   Plan Name: OP FOLFOX 6 Q2W  Treatment Goal: Curative  Status: Inactive  Start Date: 5/6/2020  End Date: 11/6/2020  Provider: Dylan Leyva MD  Chemotherapy: fluorouraciL injection 945 mg, 400 mg/m2 = 945 mg, Intravenous, [facility] 1 time, 14 of 14 cycles  Administration: 945 mg (5/6/2020), 945 mg (5/20/2020), 945 mg (6/3/2020), 945 mg (6/17/2020), 945 mg (7/29/2020), 945 mg (8/12/2020), 945 mg (8/26/2020), 945 mg (9/9/2020), 945 mg (9/23/2020), 945 mg (10/6/2020), 945 mg (10/21/2020), 945 mg (11/4/2020)  fluorouraciL 2,400 mg/m2 = 5,665 mg in sodium chloride 0.9% 240 mL chemo infusion, 2,400 mg/m2 = 5,665 mg, Intravenous, Over 46 hours, 14 of 14 cycles  Administration: 5,665 mg (5/6/2020), 5,665 mg (5/20/2020), 5,665 mg (6/3/2020), 5,665 mg (6/17/2020), 5,665 mg (7/29/2020), 5,665 mg (8/12/2020), 5,665 mg (8/26/2020), 5,665 mg (9/9/2020), 5,665 mg (9/23/2020), 5,665 mg (10/6/2020), 5,665 mg (10/21/2020), 5,665 mg (11/4/2020)  leucovorin calcium 900 mg in dextrose 5 % 250 mL infusion, 945 mg, Intravenous, Clinic/[facility] 1 time, 14 of 14 cycles  Administration: 900 mg (5/6/2020), 900 mg (5/20/2020), 900 mg (6/3/2020), 900 mg (6/17/2020), 945 mg (6/30/2020), 945 mg (7/20/2020), 945 mg (7/29/2020), 945 mg (8/12/2020), 945 mg (8/26/2020), 945 mg (9/9/2020), 945  mg (9/23/2020), 945 mg (10/6/2020), 945 mg (10/21/2020), 945 mg (11/4/2020)  oxaliplatin (ELOXATIN) 200 mg in dextrose 5 % 500 mL chemo infusion, 201 mg, Intravenous, [facility] 1 time, 6 of 6 cycles  Dose modification: 65 mg/m2 (original dose 85 mg/m2, Cycle 6)  Administration: 200 mg (5/6/2020), 200 mg (5/20/2020), 200 mg (6/3/2020), 200 mg (6/17/2020), 201 mg (6/30/2020), 150 mg (7/20/2020)     6/16/2021 - 8/2/2023 Chemotherapy    Treatment Summary   Plan Name: OP COLORECTAL FOLFIRI + BEVACIZUMAB Q2W  Treatment Goal: Control  Status: Inactive  Start Date: 6/16/2021  End Date: 8/2/2023  Provider: Marah Santo MD  Chemotherapy: fluorouraciL injection 990 mg, 400 mg/m2 = 990 mg, Intravenous, [facility] 1 time, 15 of 15 cycles  Administration: 990 mg (6/16/2021), 990 mg (6/30/2021), 990 mg (7/14/2021), 980 mg (7/28/2021), 990 mg (8/11/2021), 990 mg (8/25/2021), 990 mg (9/8/2021), 990 mg (9/22/2021), 990 mg (10/6/2021), 990 mg (10/20/2021), 990 mg (11/3/2021), 990 mg (12/1/2021), 990 mg (12/15/2021), 990 mg (11/17/2021), 990 mg (12/28/2021)  fluorouraciL 2,400 mg/m2 = 5,950 mg in sodium chloride 0.9% 240 mL chemo infusion, 2,400 mg/m2 = 5,950 mg, Intravenous, Over 46 hours, 43 of 46 cycles  Administration: 5,950 mg (6/16/2021), 5,950 mg (6/30/2021), 5,950 mg (7/14/2021), 5,880 mg (7/28/2021), 5,930 mg (8/11/2021), 5,930 mg (8/25/2021), 5,930 mg (9/8/2021), 5,930 mg (9/22/2021), 5,930 mg (10/6/2021), 5,930 mg (10/20/2021), 5,930 mg (11/3/2021), 5,930 mg (12/1/2021), 5,930 mg (12/15/2021), 5,930 mg (11/17/2021), 5,930 mg (12/28/2021), 6,050 mg (5/11/2022), 6,025 mg (3/9/2022), 6,025 mg (2/23/2022), 5,930 mg (2/2/2022), 5,930 mg (1/19/2022), 6,050 mg (4/20/2022), 6,025 mg (4/6/2022), 6,025 mg (3/23/2022), 6,050 mg (6/1/2022), 6,050 mg (6/15/2022), 6,050 mg (10/19/2022), 6,050 mg (10/5/2022), 6,050 mg (11/2/2022), 6,050 mg (11/16/2022), 6,050 mg (11/30/2022), 6,050 mg (1/4/2023), 6,050 mg (12/19/2022), 6,050 mg  (1/18/2023), 6,000 mg (5/22/2023), 6,000 mg (4/26/2023), 6,000 mg (4/11/2023), 6,000 mg (2/28/2023), 6,050 mg (2/14/2023), 6,000 mg (2/1/2023), 6,000 mg (7/5/2023), 6,000 mg (6/19/2023), 6,000 mg (6/5/2023), 6,000 mg (7/31/2023)  bevacizumab (AVASTIN) 600 mg in sodium chloride 0.9% 100 mL chemo infusion, 660 mg, Intravenous, [facility] 1 time, 36 of 36 cycles  Dose modification: 5 mg/kg (original dose 5 mg/kg, Cycle 26, Reason: MD Discretion, Comment: 5 mg/kg original dose)  Administration: 600 mg (6/30/2021), 600 mg (7/14/2021), 600 mg (7/28/2021), 600 mg (6/16/2021), 600 mg (8/11/2021), 655 mg (8/25/2021), 600 mg (9/8/2021), 600 mg (9/22/2021), 600 mg (10/6/2021), 600 mg (10/20/2021), 600 mg (11/3/2021), 655 mg (12/1/2021), 600 mg (12/15/2021), 600 mg (11/17/2021), 600 mg (12/28/2021), 600 mg (5/11/2022), 600 mg (3/9/2022), 600 mg (2/23/2022), 600 mg (2/2/2022), 600 mg (1/19/2022), 600 mg (4/20/2022), 680 mg (4/6/2022), 600 mg (3/23/2022), 680 mg (10/5/2022), 680 mg (10/19/2022), 680 mg (11/2/2022), 680 mg (11/16/2022), 680 mg (11/30/2022), 680 mg (1/4/2023), 680 mg (12/19/2022), 680 mg (1/18/2023), 680 mg (3/28/2023), 680 mg (3/14/2023), 680 mg (2/28/2023), 680 mg (2/14/2023), 680 mg (2/1/2023)  irinotecan (CAMPTOSAR) 440 mg in sodium chloride 0.9% 500 mL chemo infusion, 446 mg, Intravenous, Clinic/[facility] 1 time, 45 of 48 cycles  Dose modification: 180 mg/m2 (original dose 180 mg/m2, Cycle 24)  Administration: 440 mg (6/16/2021), 440 mg (6/30/2021), 440 mg (7/14/2021), 440 mg (7/28/2021), 440 mg (8/11/2021), 444 mg (8/25/2021), 440 mg (9/8/2021), 440 mg (9/22/2021), 440 mg (10/6/2021), 440 mg (10/20/2021), 440 mg (11/3/2021), 440 mg (12/1/2021), 440 mg (12/15/2021), 440 mg (11/17/2021), 440 mg (12/28/2021), 440 mg (5/11/2022), 440 mg (3/9/2022), 440 mg (2/23/2022), 440 mg (2/2/2022), 440 mg (1/19/2022), 440 mg (4/20/2022), 440 mg (4/6/2022), 440 mg (3/24/2022), 440 mg (6/1/2022), 440 mg (6/15/2022), 440 mg  (10/19/2022), 440 mg (10/5/2022), 440 mg (11/2/2022), 440 mg (11/16/2022), 440 mg (11/30/2022), 440 mg (1/4/2023), 440 mg (12/19/2022), 440 mg (1/18/2023), 440 mg (5/22/2023), 440 mg (4/26/2023), 440 mg (4/11/2023), 440 mg (3/28/2023), 440 mg (3/14/2023), 440 mg (2/28/2023), 440 mg (2/14/2023), 440 mg (2/1/2023), 440 mg (7/5/2023), 440 mg (6/19/2023), 440 mg (6/5/2023), 440 mg (7/31/2023)  bevacizumab-awwb (MVASI) 5 mg/kg = 680 mg in sodium chloride 0.9% 100 mL infusion, 5 mg/kg = 680 mg (100 % of original dose 5 mg/kg), Intravenous, [facility] 1 time, 7 of 10 cycles  Dose modification: 5 mg/kg (original dose 5 mg/kg, Cycle 39)  Administration: 680 mg (4/11/2023), 680 mg (4/26/2023), 680 mg (5/22/2023), 680 mg (7/5/2023), 680 mg (6/19/2023), 680 mg (6/5/2023), 680 mg (7/31/2023)     8/17/2023 - 8/18/2023 Chemotherapy    Treatment Summary   Plan Name: OP CETUXIMAB 500MG Q2W  Treatment Goal: Control  Status: Inactive  Start Date:   End Date:   Provider: Marah Santo MD  Chemotherapy: [No matching medication found in this treatment plan]     8/24/2023 -  Chemotherapy    Treatment Summary   Plan Name: [facility] - ARM 1 ENCORAFENIB  CETUXIMAB NIVOLUMAB  Treatment Goal: Palliative  Status: Active  Start Date: 8/24/2023  End Date: 7/12/2024 (Planned)  Provider: Marah Santo MD  Chemotherapy: cetuximab (ERBITUX) 100 mg/50 mL chemo infusion 1,200 mg, 1,230 mg, Intravenous, Clinic/[facility] 1 time, 4 of 12 cycles  Administration: 1,200 mg (8/24/2023), 1,200 mg (9/8/2023), 1,200 mg (9/22/2023), 1,200 mg (10/6/2023), 1,200 mg (10/20/2023), 1,200 mg (11/3/2023), 1,200 mg (11/17/2023)  encorafenib 75 mg Cap, 300 mg, Oral, Daily, 1 of 1 cycle, Start date: --, End date: --         Objective:  Gail Mckinnon arrived late promptly for the session.  Ms. Mckinnon was independently ambulatory at the time of session. The patient was fully cooperative throughout the session.  Appearance: age appropriate, appropriately  dressed,  adequately  groomed  Behavior/Cooperation: friendly and cooperative  Speech: normal in rate, volume, and tone  Mood: euthymic  Affect: euthymic  Thought Process: goal-directed, logical  Thought Content: normal,  No delusions or paranoia; did not appear to be responding to internal stimuli during the session  Orientation: grossly intact  Memory: grossly intact  Attention Span/Concentration: Attends to session without distraction; reports no difficulty  Fund of Knowledge: average  Estimate of Intelligence: average from verbal skills and history  Cognition: grossly intact  Insight: patient has awareness of illness; good insight into own behavior and behavior of others  Judgment: the patient's behavior is adequate to circumstances    NCCN Distress thermometer:       11/29/2023    12:07 AM 11/16/2023     9:08 AM 10/29/2023    12:49 PM 10/18/2023     9:46 AM 10/3/2023     3:43 PM 9/14/2023     4:44 PM 9/7/2023     1:00 PM   DISTRESS SCREENING   Distress Score 6 0 - No Distress 4 4    4 4 5 5   Practical Problems Insurance/Financial;Work/School None of these Insurance/Financial Insurance/Financial    Insurance/Financial Insurance/Financial Insurance/Financial Insurance/Financial   Family Problems Dealing with Children None of these Dealing with Children;Family Health Issues Dealing with Children    Dealing with Children Dealing with Children;Family Health Issues Dealing with Children Dealing with Children   Emotional Problems Depression;Sadness;Worry;Loss of Interest None of these Depression;Fears;Sadness;Worry Depression;Worry    Depression;Worry Depression;Sadness;Worry Depression;Sadness;Worry Depression;Fears;Worry   Spiritual / Mandaen Concerns No No No No    No No No No   Physical Problems Fatigue;Feeling Swollen;Skin Dry/Itchy None of these Memory/Concentration;Skin Dry/Itchy;Sleep Skin Dry/Itchy;Sleep    Skin Dry/Itchy;Sleep Fatigue;Sleep Fatigue Sleep        Interval history and content of current session:   Discussed treatment, prognosis, current adaptation to disease and treatment status, and family's adaptation to disease and treatment status. Reports to be coping with moderate difficulty. She experienced a period of staying in bed and crying frequently for three days since her last appointment. She reported being physically exhausted from cleaning and Thanksgiving and experiencing many stressors (pain, relationships, finances) which led to her staying in bed. She was provided with education on depression and the rationale for behavioral activation. She was encouraged to plan one activity for herself each day. She also reported wanting to journal her cancer journey but not knowing how to begin. Journaling tips were discussed.      Risk parameters:   Patient reports no suicidal ideation  Patient reports no homicidal ideation  Patient reports no self-injurious behavior  Patient reports no violent behavior   Safety needs:  None at this time      Verbal deficits: None     Patient's response to intervention:The patient's response to intervention is accepting.     Progress toward goals and other mental status changes:  The patient's progress toward goals is fair .      Progress to date:Progress - Ongoing, but Slow      Goals from last visit: Met- she has been utilizing sleep hygiene tips discussed in her previous appointment        Patient Strengths: verbal, intelligent, successful, good social support, good insight, commitment to wellness, strong miah      Treatment Plan:individual psychotherapy  Target symptoms: depression  Why chosen therapy is appropriate versus another modality: evidence based practice  Outcome monitoring methods: self-report  Therapeutic intervention type: behavior modifying psychotherapy, supportive psychotherapy  Prognosis: Good      Behavioral goals:    Therapy: plan one activity each day, begin journaling on a daily basis    Return to clinic: 1 month     Length of Service (minutes direct  face-to-face contact): 60 minutes    Diagnosis:     ICD-10-CM ICD-9-CM   1. Moderate episode of recurrent major depressive disorder  F33.1 296.32                     Myla Gallagher, PhD  Clinical Health Psychology Fellow

## 2023-11-30 ENCOUNTER — LAB VISIT (OUTPATIENT)
Dept: LAB | Facility: HOSPITAL | Age: 53
End: 2023-11-30
Attending: INTERNAL MEDICINE
Payer: MEDICAID

## 2023-11-30 ENCOUNTER — OFFICE VISIT (OUTPATIENT)
Dept: PSYCHIATRY | Facility: CLINIC | Age: 53
End: 2023-11-30
Payer: MEDICAID

## 2023-11-30 ENCOUNTER — OFFICE VISIT (OUTPATIENT)
Dept: HEMATOLOGY/ONCOLOGY | Facility: CLINIC | Age: 53
End: 2023-11-30
Payer: MEDICAID

## 2023-11-30 ENCOUNTER — PATIENT MESSAGE (OUTPATIENT)
Dept: PSYCHIATRY | Facility: CLINIC | Age: 53
End: 2023-11-30
Payer: MEDICAID

## 2023-11-30 ENCOUNTER — RESEARCH ENCOUNTER (OUTPATIENT)
Dept: RESEARCH | Facility: HOSPITAL | Age: 53
End: 2023-11-30
Payer: MEDICAID

## 2023-11-30 VITALS
OXYGEN SATURATION: 95 % | HEIGHT: 66 IN | SYSTOLIC BLOOD PRESSURE: 134 MMHG | WEIGHT: 286.38 LBS | TEMPERATURE: 98 F | RESPIRATION RATE: 22 BRPM | BODY MASS INDEX: 46.02 KG/M2 | DIASTOLIC BLOOD PRESSURE: 80 MMHG | HEART RATE: 105 BPM

## 2023-11-30 DIAGNOSIS — F32.A DEPRESSIVE DISORDER: ICD-10-CM

## 2023-11-30 DIAGNOSIS — C78.6 SECONDARY MALIGNANT NEOPLASM OF RETROPERITONEUM: ICD-10-CM

## 2023-11-30 DIAGNOSIS — C18.7 MALIGNANT NEOPLASM OF SIGMOID COLON: Primary | ICD-10-CM

## 2023-11-30 DIAGNOSIS — Z00.6 EXAMINATION OF PARTICIPANT IN CLINICAL TRIAL: ICD-10-CM

## 2023-11-30 DIAGNOSIS — C18.7 MALIGNANT NEOPLASM OF SIGMOID COLON: ICD-10-CM

## 2023-11-30 DIAGNOSIS — C77.2 METASTASIS TO INTESTINAL LYMPH NODE: ICD-10-CM

## 2023-11-30 DIAGNOSIS — F33.1 MODERATE EPISODE OF RECURRENT MAJOR DEPRESSIVE DISORDER: Primary | ICD-10-CM

## 2023-11-30 DIAGNOSIS — C77.9 SECONDARY ADENOCARCINOMA OF LYMPH NODE: ICD-10-CM

## 2023-11-30 LAB
ALBUMIN SERPL BCP-MCNC: 3.6 G/DL (ref 3.5–5.2)
ALP SERPL-CCNC: 117 U/L (ref 55–135)
ALT SERPL W/O P-5'-P-CCNC: 20 U/L (ref 10–44)
ANION GAP SERPL CALC-SCNC: 8 MMOL/L (ref 8–16)
AST SERPL-CCNC: 16 U/L (ref 10–40)
BASOPHILS # BLD AUTO: 0.04 K/UL (ref 0–0.2)
BASOPHILS NFR BLD: 0.6 % (ref 0–1.9)
BILIRUB SERPL-MCNC: 0.4 MG/DL (ref 0.1–1)
BUN SERPL-MCNC: 13 MG/DL (ref 6–20)
CALCIUM SERPL-MCNC: 11.2 MG/DL (ref 8.7–10.5)
CHLORIDE SERPL-SCNC: 108 MMOL/L (ref 95–110)
CO2 SERPL-SCNC: 23 MMOL/L (ref 23–29)
CREAT SERPL-MCNC: 0.8 MG/DL (ref 0.5–1.4)
DIFFERENTIAL METHOD: ABNORMAL
EOSINOPHIL # BLD AUTO: 0.4 K/UL (ref 0–0.5)
EOSINOPHIL NFR BLD: 6.3 % (ref 0–8)
ERYTHROCYTE [DISTWIDTH] IN BLOOD BY AUTOMATED COUNT: 15.6 % (ref 11.5–14.5)
EST. GFR  (NO RACE VARIABLE): >60 ML/MIN/1.73 M^2
GLUCOSE SERPL-MCNC: 119 MG/DL (ref 70–110)
HCT VFR BLD AUTO: 43.3 % (ref 37–48.5)
HGB BLD-MCNC: 13.7 G/DL (ref 12–16)
IMM GRANULOCYTES # BLD AUTO: 0.03 K/UL (ref 0–0.04)
IMM GRANULOCYTES NFR BLD AUTO: 0.5 % (ref 0–0.5)
LYMPHOCYTES # BLD AUTO: 1.1 K/UL (ref 1–4.8)
LYMPHOCYTES NFR BLD: 17 % (ref 18–48)
MAGNESIUM SERPL-MCNC: 1.9 MG/DL (ref 1.6–2.6)
MCH RBC QN AUTO: 25.9 PG (ref 27–31)
MCHC RBC AUTO-ENTMCNC: 31.6 G/DL (ref 32–36)
MCV RBC AUTO: 82 FL (ref 82–98)
MONOCYTES # BLD AUTO: 0.8 K/UL (ref 0.3–1)
MONOCYTES NFR BLD: 13.3 % (ref 4–15)
NEUTROPHILS # BLD AUTO: 3.9 K/UL (ref 1.8–7.7)
NEUTROPHILS NFR BLD: 62.3 % (ref 38–73)
NRBC BLD-RTO: 0 /100 WBC
PHOSPHATE SERPL-MCNC: 2.7 MG/DL (ref 2.7–4.5)
PLATELET # BLD AUTO: 315 K/UL (ref 150–450)
PMV BLD AUTO: 9.7 FL (ref 9.2–12.9)
POTASSIUM SERPL-SCNC: 4.5 MMOL/L (ref 3.5–5.1)
PROT SERPL-MCNC: 7 G/DL (ref 6–8.4)
RBC # BLD AUTO: 5.29 M/UL (ref 4–5.4)
SODIUM SERPL-SCNC: 139 MMOL/L (ref 136–145)
WBC # BLD AUTO: 6.22 K/UL (ref 3.9–12.7)

## 2023-11-30 PROCEDURE — 90837 PSYTX W PT 60 MINUTES: CPT | Mod: AH,HB,,

## 2023-11-30 PROCEDURE — 84100 ASSAY OF PHOSPHORUS: CPT | Mod: PN | Performed by: INTERNAL MEDICINE

## 2023-11-30 PROCEDURE — 99999 PR PBB SHADOW E&M-EST. PATIENT-LVL IV: ICD-10-PCS | Mod: PBBFAC,,, | Performed by: INTERNAL MEDICINE

## 2023-11-30 PROCEDURE — 99215 OFFICE O/P EST HI 40 MIN: CPT | Mod: S$PBB,,, | Performed by: INTERNAL MEDICINE

## 2023-11-30 PROCEDURE — 99999 PR PBB SHADOW E&M-EST. PATIENT-LVL IV: CPT | Mod: PBBFAC,,, | Performed by: INTERNAL MEDICINE

## 2023-11-30 PROCEDURE — 4010F ACE/ARB THERAPY RXD/TAKEN: CPT | Mod: CPTII,,, | Performed by: INTERNAL MEDICINE

## 2023-11-30 PROCEDURE — 4010F PR ACE/ARB THEARPY RXD/TAKEN: ICD-10-PCS | Mod: CPTII,,, | Performed by: INTERNAL MEDICINE

## 2023-11-30 PROCEDURE — 80053 COMPREHEN METABOLIC PANEL: CPT | Mod: PN | Performed by: INTERNAL MEDICINE

## 2023-11-30 PROCEDURE — 3075F PR MOST RECENT SYSTOLIC BLOOD PRESS GE 130-139MM HG: ICD-10-PCS | Mod: CPTII,,, | Performed by: INTERNAL MEDICINE

## 2023-11-30 PROCEDURE — 3008F BODY MASS INDEX DOCD: CPT | Mod: CPTII,,, | Performed by: INTERNAL MEDICINE

## 2023-11-30 PROCEDURE — 83735 ASSAY OF MAGNESIUM: CPT | Mod: PN | Performed by: INTERNAL MEDICINE

## 2023-11-30 PROCEDURE — 99215 PR OFFICE/OUTPT VISIT, EST, LEVL V, 40-54 MIN: ICD-10-PCS | Mod: S$PBB,,, | Performed by: INTERNAL MEDICINE

## 2023-11-30 PROCEDURE — 4010F PR ACE/ARB THEARPY RXD/TAKEN: ICD-10-PCS | Mod: CPTII,,,

## 2023-11-30 PROCEDURE — 3075F SYST BP GE 130 - 139MM HG: CPT | Mod: CPTII,,, | Performed by: INTERNAL MEDICINE

## 2023-11-30 PROCEDURE — 85025 COMPLETE CBC W/AUTO DIFF WBC: CPT | Mod: PN | Performed by: INTERNAL MEDICINE

## 2023-11-30 PROCEDURE — 36415 COLL VENOUS BLD VENIPUNCTURE: CPT | Mod: PN | Performed by: INTERNAL MEDICINE

## 2023-11-30 PROCEDURE — 90837 PR PSYCHOTHERAPY W/PATIENT, 60 MIN: ICD-10-PCS | Mod: AH,HB,,

## 2023-11-30 PROCEDURE — 3079F PR MOST RECENT DIASTOLIC BLOOD PRESSURE 80-89 MM HG: ICD-10-PCS | Mod: CPTII,,, | Performed by: INTERNAL MEDICINE

## 2023-11-30 PROCEDURE — 99214 OFFICE O/P EST MOD 30 MIN: CPT | Mod: PBBFAC,PN | Performed by: INTERNAL MEDICINE

## 2023-11-30 PROCEDURE — 4010F ACE/ARB THERAPY RXD/TAKEN: CPT | Mod: CPTII,,,

## 2023-11-30 PROCEDURE — 3008F PR BODY MASS INDEX (BMI) DOCUMENTED: ICD-10-PCS | Mod: CPTII,,, | Performed by: INTERNAL MEDICINE

## 2023-11-30 PROCEDURE — 3079F DIAST BP 80-89 MM HG: CPT | Mod: CPTII,,, | Performed by: INTERNAL MEDICINE

## 2023-11-30 NOTE — PROGRESS NOTES
Gail Mckinnon, MRN [id number]  PID# [id number]     Protocol: SWOG   IRB#: 2023.047  : NGUYEN Degroot MD  Treating Investigator: JESUS Santo MD     Randomized Phase II Trial of Encorafenib and Cetuximab with or Without Nivolumab (NSC #[id number]) for Patients with Previously Treated, Microsatellite Stable BRAF V600E Metastatic and/or Unresectable Colorectal Cancer   Cycle 1 Day 15 Arm 1/ Nivolumab + Cetuximab + Encorafenib therapy.     November 30th, 2023 --> Cycle 4 Day 14     C4 Day 15 à December 1st, 2023.      The patient presented to the planned office visit alone. The patient was alert, oriented, without noted distress, with appropriate mood and affect for the situation, and freely agreed to continue with participating in the referenced study.  Per Section 7.4- Administration of cetuximab will be dosed every 14 days (+/- 7 days).     30NOV2023 pre-tx labs: CBC w/ diff, CMP, Magnesium resulted and reviewed.   Endocrinology following TSH for patient's baseline Grade II Hypothyroidism (resulted from treatment for Hyperthyroidism). See communication from study chair regarding TSH monitoring for this patient in Cycle 1 section of shadow chart. Additional source documentation in Cycle 4 Day 1 shadow chart and linked to 16NOV2023 CRC note.     ConMed list: reviewed with the patient for accuracy- see shadow chart for changes.      Patient admits to being compliant with QD dosing of encorafenib and the use of the protocol's pill diary.   Patient reports she is also compliant with adjusted dose of Synthroid (300 mcg) two 150 mcg tablets Daily since 14NOV2023.The patient knows to return remaining encorafenib and protocol pill diary at next appointment.  The patient verbalized understanding to take Encorafenib #4 capsule with water in the morning at the same time each day. Pt. also agreed to taking before leaving home on the days of infusional therapy to provide one hour time frame before receiving oral  pre-medications.  Pt. instructed to notify this CRC if she does not receive communication for next Encorafenib refill from [facility] by second week in December.    Baseline AEs:  See shadow chart for full list of Baseline AEs.     Baseline Hypothyroidism- Grade 2 (Start date 2012-continues) per documentation by Dr. Moss, the hypothyroidism resulted from treatment for hyperthyroidism.   Dr. Santo is aware of Synthroid dosing changes (300mcg QD) for Grade 2 BASELINE Hypothyroidism; does not deem related to protocol treatment, nor CS, and no new orders given.  Reviewed Section 8.3.1 Nivolumab dose mods- The Grade II Hypothyroidism present at Baseline, has not increased in grade, and the patient is asymptomatic per the treating MD's PE. The patient denies any persistent headaches or visual changes.   See source doc communications concerning elevated 03NOV2023 TSH with WNL Free T4 with Dr. Arechiga, Study Chair, in Cycle 3 Day 15 section and Cycle 4 Day 1.  Per the patient's endocrinologist, Synthroid dose adjusted and repeat TSH planned for 14DEC2023.   Baseline Fatigue- Grade 1- continues- pt reports being able to perform all ADLS. The treating MD is aware, does not deem as CS, no new orders given and continue to monitor.  Baseline Hypercalcemia-Grade 1 continues- corrected calcium calculation attached= 11.5 mg/dL. Endocrinologist monitoring as related to the patients BASELINE Hyperparathyroidism. Per Dr. Moss's office, no treatment or intervention currently.   Baseline Hyperparathyroidism- Grade 2 Continues (Start date 2021) The treating MD is aware, does not deem CS, no new orders given. Patient seen by endocrine on22NOV2023 & discussed with the treating MD; current calcium levels were not deemed CS, no new orders, will continue to monitor.      AEs:   See shadow chart for full list of AEs.     The patient specifically denies any nausea, diarrhea, or abdominal pain.     The patient denies  experiencing any infusion related reaction including chills/rigors, nausea, vomiting, or headache during or after the Cycle 3 Days 1 or 15.   Per protocol Section 7 / 7.1 & Table 3 The treating MD added hydrocortisone 50mg IV to Acetaminophen and Diphenhydramine premeds and increased infusion time of Cetuximab to four hours followed by a period of observation for previous cycles, beginning with Cycle 1 Day 15. Per the treating MD additional pre-meds and infusion extension will continue for the patient's future infusion appts.      Rash-Maculo-papular Grade 1 Start date 8/28/2023- continues. No worsening of rash noted by the treating MD with today's examination. Pt. confirms continued use of clindamycin gel and doxycycline as prescribed for sporadic areas of pruritic rash. Mild soaps, temped water, and moisturizers recommended. Erbitux skin care kit provided to patient as packaged from [employer]. The treating MD is aware, dermatological exam performed by Dr Santo during today did not reveal any rash on other parts of the patient's body, except the mild facial rash. The treating MD does not deem CS, and no new orders given.     Insomnia- Grade 2 (Start date 9/12/2023-continues) The treating MD deems as unlikely d/t to protocol therapy, and does not deem as CS, no new orders given. The patient denies taking any rx or OTC medication for sleep. The patient was reminded to avoid Trazodone while on Encorafenib. Endocrinologist is planning for the patient undergo a sleep study to evaluate the patient's snoring. (30OCT2023 Dr. Moss note).     Vitamin D, 25 Hydroxy ordered per endocrinologist; reported less than normal limits-endocrine following; No CTCAE grading found. The treating MD does not relate to therapy, does not deem as CS, no new orders given. Ergocalciferol prescribed and patient admits to starting medication on 31OCT2023.     Vaginal Infection- (Vaginal candidiasis, per treating MD) Grade 2- (Start date 11/14/2023-  End date 11/19/2023) The patient reports symptoms resolved after taking prescribed Fluconazole.    Dizziness- Grade 1- (Start 11/24/2023- End date 11/26/2023) Denies visual changes, admits to minimal headache time one day. Additionally, the pt. reports having consumed less than her normal amount of daily hydration and thinks the dizziness could be r/t inadequate intake of fluids. The pt. reports dizziness resolved after adding Pedialyte to her oral intake of fluids. The treating MD is aware, does not deem CS, no new orders given, and will monitor. Patient instructed to notify the treating MD's office or this [facility] if symptom returns.     Minimally elevated glucose level reported for this morning's blood testing. The patient was non-fasting at the time of collection. The treating MD is aware, does not deem CS, no new orders given. Will continue to monitor before listing as an AE.     /80  Pulse 105 Temp 98.1 °F (Temporal) Resp 22  Wt. 129.9 kg (286 lb 6 oz) SpO2 95%  BSA 2.46 m² Pain Sc 0-No pain    The treating MD is aware of the patient's BP and pulse readings and does not deem CS or relate to protocol therapy. No new orders given, will continue to monitor.     Zubrod PS: One     09OCT2023 CT scan: Recist Criteria reviewed by treating MD and calculation determined as stable disease. The treating MD is aware of the CT reporting, Right upper lobe pulmonary nodule appears slightly increased in size, and does not deem as CS and no new orders given and plans to monitor. See CT report and Recist form in Recist section of patient's shadow chart.  See [id number] email communications with Dr. Arechiga allowing the CT Scan to take place on 09OCT2023 to accommodate the patient's plans-source doc in C2 Day 15 section of the patient's shadow chart.      Dr. aSnto assessed all labs, VS & any PE findings as either WNL or NCS. At the discretion of the treating MD, the patient's current cardiac function does not require  evaluation by ECG.   The treating MD deemed the patient eligible to receive C 4 D 15 of protocol treatment on 01DEC2023.     Treatment Dosing for C 4 D 15:  For the patient's safety, the treating MD plans to continue following the protocol's recommendations of adding Hydrocortisone to other premeds and extending the infusion time of the Cetuximab to prevent delayed infusion reaction.  Infusion time extended per protocol Cetuximab-Related Infusion Reactions (Table 3).  Hydrocortisone 50mg IV added to premedication of Acetaminophen and Diphenhydramine. Protocol Section 7.0 / 7.1     Time out for dosing of protocol treatment completed with the treating MD.  01Dec2023 treatment plan orders reviewed with Dr Santo. Protocol treatment orders signed at Cycle 4 Day1.     Since pt's wt. has not changed by 10% from original dosing, no dosing changes required.  Baseline BSA 2.46 m2 based on Screening: Ht. 5'6 and 287.26 lbs. (130.3 kg)      Cetuximab 500 mg/m2 x 2.46 m2 BSA = 1230 mg over 4 hours- Rounded dose via pharmacy policy resulted in a dose of 1200mg.  Encorafenib 75mg tabs: 300mg po days 1-28.       This CRC reviewed upcoming appointments with the patient and encouraged to call with any new symptoms or issues, the patient verbalized understanding. Patient made aware to call after hour number, that she agrees to having, if after business hours or over the weekend. The patient is aware to return in the morning to receive cycle four day 15 of protocol treatment. The patient denied having any additional questions and was discharged without noted distress.     Next protocol required bio-specimen (Whole Blood) due at progression of disease. (Section 9.0 Study Calendar)     Next Every 8-week CT C/A/P (Section 7.5): 12/04/2023 @ 0930 (patient informed)     Cycle 5 Day 1:     [id number] 1st floor lab @ 08:15 (protocol labs linked to existing endocrinologist lab appt.)  See source doc from Study chair dated 8/31/2023 (in  patient shadow chart) allowing 3-day window of toxicity assessments.   [id number] OV with Dr. Santo @ 10:00   [id number] Infusion at 08:30     CHANDRIKA Adams RN

## 2023-11-30 NOTE — PROGRESS NOTES
PROGRESS NOTE    Subjective:       Patient ID: Gail Mckinnon is a 53 y.o. female.  MRN: [id number]  : 1970    Chief Complaint: Metastatic colon cancer     History of Present Illness:   Gail Mckinnon is a 53 y.o. female who presents with colon cancer, initially stage III and now with LN recurrence.    On FOLIRI+ Avastin sine .     She was briefly switched to xeloda due to 5 FU shortage for a month. Did not tolerate Xeloda well due hand foot syndrome, blistering of feet, did not take the last day of Xeloda. Completed .     Resumed Folfiri + Avastin on 23. Atropine was held due to prior constipation.     Was admitted to the hospital from -23 for intractable nausea , vomiting and diarrhea as well as abdominal pain. No hematochezia or dark stool was noted.      US showed gallbladder stone and dilated CBD. She was managed conservatively. Had CT abd, pelvis, colonoscopy and MRCP that were relatively unremarkable without signs of cholecystitis. Cholecystectomy was not recommended.     She had recently resumed FOLFIRI and the symptoms started after the first cycle of resuming, never had issues prior to this.  Stayed in the hospital for 10 days.  C diff was negative. No other etiology was established. Symptoms improved over time and she was discharged on .     Interim history:  Enrolled in  SWOG 2107 (encorafenib/cetuximab and randomized to the Nivolumab arm), here to obtain for cycle 4, day 15.    Staging scans are done as per research protocol first completed on 10/9, consistent with response to treatment. To be repeated every 8 weeks , next due .     Felt dizzy for 1-2 days, felt dehydrated, took some Pedialyte.   Denies n/v/d/c.     Reports stable fatigue, skin rash, on doxycycline. Mostly facial rash. Has noticed vaginal yeast infection, resolved on fluconazole.       No recurrence of abdominal pain. No further  infusion reactions. Has stable fatigue, rash. Insomnia is better.      Noted Grave's disease history in 1998, treated with LEE, now on synthroid. Recent increase in the dose of levothyroxine.     Oncology History:  She completed adjuvant FOLFOX in November 2020. She tolerated only 4 cycles of FOLFOX before she developed an infusion reaction to oxaliplatin in cycle 5 was well as cycle 6. She then completed 6 cycles of infusional 5 FU.     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 She was offered second line therapy with FOLFIRI and Avastin.      She presented to the ED on 11/26 with left flank pain. CT renal stone study showed Moderate hydronephrosis on the left secondary to 3 mm calculus at the UPJ.    She had a PET scan mid April that showed possible progression of her disease with      She has been to [facility] for another opinion. No change was recommended in systemic therapy but she was offered surgical removal of the aorta caval nodes.  Recent sans at [facility]  show stable disease.      Surgery done 7/12/22. Received 2 more cycle of FOLFIRI without Avastin.     She had repeat imaging at [facility] on 9/24/22 that unfortunately showed disease progression since her surgery with multiple RP nodes and one mediastinal node.       PET 12/8/22  Impression:     1. Progression of disease with interval increase of abdominal and pelvic lymphadenopathy.  2. New area of moderate FDG uptake at the right half of the T9 vertebral body (image 124).  Favor degenerative disc changes.  No underlying osteolytic or osteoblastic lesion.  Recommend continued attention on follow-up.  3. No other evidence of FDG avid disease.     12/22/22  Impression After scan comparison:     1. Metastatic portacaval and left periaortic retroperitoneal lymph nodes which remain stable between the CT of 09/24/2022 and the subsequent PET-CT of 12/08/2022.  There is no evidence of progression of metastatic disease.  2. Nonobstructing bilateral  renal calculi and cholelithiasis.    2/10/22:  CT chest abd pelvis  Impression:     Stable periportal, retroperitoneal and mesenteric lymphadenopathy compared to PET-CT 12/08/2022.     Stable right middle lobe 3 mm pulmonary nodule.  No new or enlarging pulmonary nodule.     Hepatic steatosis.  Hepatosplenomegaly.     Cholelithiasis.     Bilateral nonobstructing nephrolithiasis.     Small hiatal hernia with retained contrast in the distal esophagus.  Correlate for reflux.    She had virtual visit at [facility] on 2/17/23.     She has continued FOLFIRI and Avastin.     CT abd pelvis   5/7/23  Impression:     1. Mesenteric inflammatory changes have worsened since the prior study.  2. Mesenteric, retroperitoneal and left-sided pelvic enlarged lymph nodes are unchanged.     8/11/23  CT chest abd pelvis   Impression:     1. Patient with a history of colon adenocarcinoma with worsening periaortic, retroperitoneal, mesenteric, and iliac chain lymphadenopathy the in the abdomen and pelvis when compared with the previous study suggesting worsening metastatic disease.  2. Moderate left hydronephrosis and proximal left hydroureter to the level of possible retroperitoneal scarring.  Invasion/compression of the left ureter is not excluded.  3. Bilateral nephrolithiasis  4. Hepatomegaly and hepatic steatosis  5. Two tiny < 5 mm pulmonary nodules within the chest, unchanged.  No new pulmonary nodules.    10/9/23  CT chest abd pelvis     Impression:     Decreased size of retroperitoneal and mesenteric lymph nodes.  Stable left external iliac lymph node. Right upper lobe pulmonary nodule appears slightly increased in size.  No new pulmonary nodules.  Findings suggest mixed/partial response to therapy.     Cholelithiasis.     Hepatosplenomegaly.     Small pericardial effusion.     RECIST SUMMARY:     Date of prior examination for comparison: 08/11/2023     Lesion 1: Retroperitoneal soft tissue nodule but in the duodenum.  1.7 cm. Series 3  image 93.  Prior measurement 82.1 cm.     Lesion 2: Celiac axis lymph node.  1.5 cm. Series 3 image 56.  Prior measurement 1.8 cm.     Lesion 3: Portacaval lymph node.  1.0 cm. Series 3 image 59.  Prior measurement 1.2 cm.     Lesion 4: Mesenteric lymph node.  1.2 cm. Series 3 image 105.  Prior measurement 1.6 cm.       Oncology History:  Oncology History   Malignant neoplasm of sigmoid colon   3/16/2020 Initial Diagnosis    Malignant neoplasm of sigmoid colon     3/31/2020 Cancer Staged    Staging form: Colon and Rectum, AJCC 8th Edition  - Clinical stage from 3/31/2020: Stage IIIC (cT4b, cN2a, cM0)     5/6/2020 - 11/17/2020 Chemotherapy    Treatment Summary   Plan Name: OP FOLFOX 6 Q2W  Treatment Goal: Curative  Status: Inactive  Start Date: 5/6/2020  End Date: 11/6/2020  Provider: Dylan Leyva MD  Chemotherapy: fluorouraciL injection 945 mg, 400 mg/m2 = 945 mg, Intravenous, Clinic/[facility] 1 time, 14 of 14 cycles  Administration: 945 mg (5/6/2020), 945 mg (5/20/2020), 945 mg (6/3/2020), 945 mg (6/17/2020), 945 mg (7/29/2020), 945 mg (8/12/2020), 945 mg (8/26/2020), 945 mg (9/9/2020), 945 mg (9/23/2020), 945 mg (10/6/2020), 945 mg (10/21/2020), 945 mg (11/4/2020)  fluorouraciL 2,400 mg/m2 = 5,665 mg in sodium chloride 0.9% 240 mL chemo infusion, 2,400 mg/m2 = 5,665 mg, Intravenous, Over 46 hours, 14 of 14 cycles  Administration: 5,665 mg (5/6/2020), 5,665 mg (5/20/2020), 5,665 mg (6/3/2020), 5,665 mg (6/17/2020), 5,665 mg (7/29/2020), 5,665 mg (8/12/2020), 5,665 mg (8/26/2020), 5,665 mg (9/9/2020), 5,665 mg (9/23/2020), 5,665 mg (10/6/2020), 5,665 mg (10/21/2020), 5,665 mg (11/4/2020)  leucovorin calcium 900 mg in dextrose 5 % 250 mL infusion, 945 mg, Intravenous, Clinic/[facility] 1 time, 14 of 14 cycles  Administration: 900 mg (5/6/2020), 900 mg (5/20/2020), 900 mg (6/3/2020), 900 mg (6/17/2020), 945 mg (6/30/2020), 945 mg (7/20/2020), 945 mg (7/29/2020), 945 mg (8/12/2020), 945 mg (8/26/2020), 945 mg (9/9/2020), 945 mg  (9/23/2020), 945 mg (10/6/2020), 945 mg (10/21/2020), 945 mg (11/4/2020)  oxaliplatin (ELOXATIN) 200 mg in dextrose 5 % 500 mL chemo infusion, 201 mg, Intravenous, [facility] 1 time, 6 of 6 cycles  Dose modification: 65 mg/m2 (original dose 85 mg/m2, Cycle 6)  Administration: 200 mg (5/6/2020), 200 mg (5/20/2020), 200 mg (6/3/2020), 200 mg (6/17/2020), 201 mg (6/30/2020), 150 mg (7/20/2020)     5/3/2021 Cancer Staged    Staging form: Colon and Rectum, AJCC 8th Edition  - Clinical stage from 5/3/2021: Stage Unknown (rcT0, cNX, cM1)     6/16/2021 - 8/2/2023 Chemotherapy    Treatment Summary   Plan Name: OP COLORECTAL FOLFIRI + BEVACIZUMAB Q2W  Treatment Goal: Control  Status: Inactive  Start Date: 6/16/2021  End Date: 8/2/2023  Provider: Marah Santo MD  Chemotherapy: fluorouraciL injection 990 mg, 400 mg/m2 = 990 mg, Intravenous, [facility] 1 time, 15 of 15 cycles  Administration: 990 mg (6/16/2021), 990 mg (6/30/2021), 990 mg (7/14/2021), 980 mg (7/28/2021), 990 mg (8/11/2021), 990 mg (8/25/2021), 990 mg (9/8/2021), 990 mg (9/22/2021), 990 mg (10/6/2021), 990 mg (10/20/2021), 990 mg (11/3/2021), 990 mg (12/1/2021), 990 mg (12/15/2021), 990 mg (11/17/2021), 990 mg (12/28/2021)  fluorouraciL 2,400 mg/m2 = 5,950 mg in sodium chloride 0.9% 240 mL chemo infusion, 2,400 mg/m2 = 5,950 mg, Intravenous, Over 46 hours, 43 of 46 cycles  Administration: 5,950 mg (6/16/2021), 5,950 mg (6/30/2021), 5,950 mg (7/14/2021), 5,880 mg (7/28/2021), 5,930 mg (8/11/2021), 5,930 mg (8/25/2021), 5,930 mg (9/8/2021), 5,930 mg (9/22/2021), 5,930 mg (10/6/2021), 5,930 mg (10/20/2021), 5,930 mg (11/3/2021), 5,930 mg (12/1/2021), 5,930 mg (12/15/2021), 5,930 mg (11/17/2021), 5,930 mg (12/28/2021), 6,050 mg (5/11/2022), 6,025 mg (3/9/2022), 6,025 mg (2/23/2022), 5,930 mg (2/2/2022), 5,930 mg (1/19/2022), 6,050 mg (4/20/2022), 6,025 mg (4/6/2022), 6,025 mg (3/23/2022), 6,050 mg (6/1/2022), 6,050 mg (6/15/2022), 6,050 mg (10/19/2022), 6,050 mg  (10/5/2022), 6,050 mg (11/2/2022), 6,050 mg (11/16/2022), 6,050 mg (11/30/2022), 6,050 mg (1/4/2023), 6,050 mg (12/19/2022), 6,050 mg (1/18/2023), 6,000 mg (5/22/2023), 6,000 mg (4/26/2023), 6,000 mg (4/11/2023), 6,000 mg (2/28/2023), 6,050 mg (2/14/2023), 6,000 mg (2/1/2023), 6,000 mg (7/5/2023), 6,000 mg (6/19/2023), 6,000 mg (6/5/2023), 6,000 mg (7/31/2023)  bevacizumab (AVASTIN) 600 mg in sodium chloride 0.9% 100 mL chemo infusion, 660 mg, Intravenous, [facility] 1 time, 36 of 36 cycles  Dose modification: 5 mg/kg (original dose 5 mg/kg, Cycle 26, Reason: MD Discretion, Comment: 5 mg/kg original dose)  Administration: 600 mg (6/30/2021), 600 mg (7/14/2021), 600 mg (7/28/2021), 600 mg (6/16/2021), 600 mg (8/11/2021), 655 mg (8/25/2021), 600 mg (9/8/2021), 600 mg (9/22/2021), 600 mg (10/6/2021), 600 mg (10/20/2021), 600 mg (11/3/2021), 655 mg (12/1/2021), 600 mg (12/15/2021), 600 mg (11/17/2021), 600 mg (12/28/2021), 600 mg (5/11/2022), 600 mg (3/9/2022), 600 mg (2/23/2022), 600 mg (2/2/2022), 600 mg (1/19/2022), 600 mg (4/20/2022), 680 mg (4/6/2022), 600 mg (3/23/2022), 680 mg (10/5/2022), 680 mg (10/19/2022), 680 mg (11/2/2022), 680 mg (11/16/2022), 680 mg (11/30/2022), 680 mg (1/4/2023), 680 mg (12/19/2022), 680 mg (1/18/2023), 680 mg (3/28/2023), 680 mg (3/14/2023), 680 mg (2/28/2023), 680 mg (2/14/2023), 680 mg (2/1/2023)  irinotecan (CAMPTOSAR) 440 mg in sodium chloride 0.9% 500 mL chemo infusion, 446 mg, Intravenous, Clinic/[facility] 1 time, 45 of 48 cycles  Dose modification: 180 mg/m2 (original dose 180 mg/m2, Cycle 24)  Administration: 440 mg (6/16/2021), 440 mg (6/30/2021), 440 mg (7/14/2021), 440 mg (7/28/2021), 440 mg (8/11/2021), 444 mg (8/25/2021), 440 mg (9/8/2021), 440 mg (9/22/2021), 440 mg (10/6/2021), 440 mg (10/20/2021), 440 mg (11/3/2021), 440 mg (12/1/2021), 440 mg (12/15/2021), 440 mg (11/17/2021), 440 mg (12/28/2021), 440 mg (5/11/2022), 440 mg (3/9/2022), 440 mg (2/23/2022), 440 mg (2/2/2022),  440 mg (1/19/2022), 440 mg (4/20/2022), 440 mg (4/6/2022), 440 mg (3/24/2022), 440 mg (6/1/2022), 440 mg (6/15/2022), 440 mg (10/19/2022), 440 mg (10/5/2022), 440 mg (11/2/2022), 440 mg (11/16/2022), 440 mg (11/30/2022), 440 mg (1/4/2023), 440 mg (12/19/2022), 440 mg (1/18/2023), 440 mg (5/22/2023), 440 mg (4/26/2023), 440 mg (4/11/2023), 440 mg (3/28/2023), 440 mg (3/14/2023), 440 mg (2/28/2023), 440 mg (2/14/2023), 440 mg (2/1/2023), 440 mg (7/5/2023), 440 mg (6/19/2023), 440 mg (6/5/2023), 440 mg (7/31/2023)  bevacizumab-awwb (MVASI) 5 mg/kg = 680 mg in sodium chloride 0.9% 100 mL infusion, 5 mg/kg = 680 mg (100 % of original dose 5 mg/kg), Intravenous, [facility] 1 time, 7 of 10 cycles  Dose modification: 5 mg/kg (original dose 5 mg/kg, Cycle 39)  Administration: 680 mg (4/11/2023), 680 mg (4/26/2023), 680 mg (5/22/2023), 680 mg (7/5/2023), 680 mg (6/19/2023), 680 mg (6/5/2023), 680 mg (7/31/2023)     8/17/2023 - 8/18/2023 Chemotherapy    Treatment Summary   Plan Name: OP CETUXIMAB 500MG Q2W  Treatment Goal: Control  Status: Inactive  Start Date:   End Date:   Provider: Marah Santo MD  Chemotherapy: [No matching medication found in this treatment plan]     8/24/2023 -  Chemotherapy    Treatment Summary   Plan Name: [facility] - ARM 1 ENCORAFENIB  CETUXIMAB NIVOLUMAB  Treatment Goal: Palliative  Status: Active  Start Date: 8/24/2023  End Date: 7/12/2024 (Planned)  Provider: Marah Santo MD  Chemotherapy: cetuximab (ERBITUX) 100 mg/50 mL chemo infusion 1,200 mg, 1,230 mg, Intravenous, [facility] 1 time, 4 of 12 cycles  Administration: 1,200 mg (8/24/2023), 1,200 mg (9/8/2023), 1,200 mg (9/22/2023), 1,200 mg (10/6/2023), 1,200 mg (10/20/2023), 1,200 mg (11/3/2023), 1,200 mg (11/17/2023)  encorafenib 75 mg Cap, 300 mg, Oral, Daily, 1 of 1 cycle, Start date: --, End date: --     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   4/3/2020 Initial Diagnosis    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     5/6/2020 - 11/17/2020  Chemotherapy    Treatment Summary   Plan Name: OP FOLFOX 6 Q2W  Treatment Goal: Curative  Status: Inactive  Start Date: 5/6/2020  End Date: 11/6/2020  Provider: Dylan Leyva MD  Chemotherapy: fluorouraciL injection 945 mg, 400 mg/m2 = 945 mg, Intravenous, [facility] 1 time, 14 of 14 cycles  Administration: 945 mg (5/6/2020), 945 mg (5/20/2020), 945 mg (6/3/2020), 945 mg (6/17/2020), 945 mg (7/29/2020), 945 mg (8/12/2020), 945 mg (8/26/2020), 945 mg (9/9/2020), 945 mg (9/23/2020), 945 mg (10/6/2020), 945 mg (10/21/2020), 945 mg (11/4/2020)  fluorouraciL 2,400 mg/m2 = 5,665 mg in sodium chloride 0.9% 240 mL chemo infusion, 2,400 mg/m2 = 5,665 mg, Intravenous, Over 46 hours, 14 of 14 cycles  Administration: 5,665 mg (5/6/2020), 5,665 mg (5/20/2020), 5,665 mg (6/3/2020), 5,665 mg (6/17/2020), 5,665 mg (7/29/2020), 5,665 mg (8/12/2020), 5,665 mg (8/26/2020), 5,665 mg (9/9/2020), 5,665 mg (9/23/2020), 5,665 mg (10/6/2020), 5,665 mg (10/21/2020), 5,665 mg (11/4/2020)  leucovorin calcium 900 mg in dextrose 5 % 250 mL infusion, 945 mg, Intravenous, Clinic/[facility] 1 time, 14 of 14 cycles  Administration: 900 mg (5/6/2020), 900 mg (5/20/2020), 900 mg (6/3/2020), 900 mg (6/17/2020), 945 mg (6/30/2020), 945 mg (7/20/2020), 945 mg (7/29/2020), 945 mg (8/12/2020), 945 mg (8/26/2020), 945 mg (9/9/2020), 945 mg (9/23/2020), 945 mg (10/6/2020), 945 mg (10/21/2020), 945 mg (11/4/2020)  oxaliplatin (ELOXATIN) 200 mg in dextrose 5 % 500 mL chemo infusion, 201 mg, Intravenous, [facility] 1 time, 6 of 6 cycles  Dose modification: 65 mg/m2 (original dose 85 mg/m2, Cycle 6)  Administration: 200 mg (5/6/2020), 200 mg (5/20/2020), 200 mg (6/3/2020), 200 mg (6/17/2020), 201 mg (6/30/2020), 150 mg (7/20/2020)     6/16/2021 - 8/2/2023 Chemotherapy    Treatment Summary   Plan Name: OP COLORECTAL FOLFIRI + BEVACIZUMAB Q2W  Treatment Goal: Control  Status: Inactive  Start Date: 6/16/2021  End Date: 8/2/2023  Provider: Marah Santo,  MD  Chemotherapy: fluorouraciL injection 990 mg, 400 mg/m2 = 990 mg, Intravenous, Clinic/[facility] 1 time, 15 of 15 cycles  Administration: 990 mg (6/16/2021), 990 mg (6/30/2021), 990 mg (7/14/2021), 980 mg (7/28/2021), 990 mg (8/11/2021), 990 mg (8/25/2021), 990 mg (9/8/2021), 990 mg (9/22/2021), 990 mg (10/6/2021), 990 mg (10/20/2021), 990 mg (11/3/2021), 990 mg (12/1/2021), 990 mg (12/15/2021), 990 mg (11/17/2021), 990 mg (12/28/2021)  fluorouraciL 2,400 mg/m2 = 5,950 mg in sodium chloride 0.9% 240 mL chemo infusion, 2,400 mg/m2 = 5,950 mg, Intravenous, Over 46 hours, 43 of 46 cycles  Administration: 5,950 mg (6/16/2021), 5,950 mg (6/30/2021), 5,950 mg (7/14/2021), 5,880 mg (7/28/2021), 5,930 mg (8/11/2021), 5,930 mg (8/25/2021), 5,930 mg (9/8/2021), 5,930 mg (9/22/2021), 5,930 mg (10/6/2021), 5,930 mg (10/20/2021), 5,930 mg (11/3/2021), 5,930 mg (12/1/2021), 5,930 mg (12/15/2021), 5,930 mg (11/17/2021), 5,930 mg (12/28/2021), 6,050 mg (5/11/2022), 6,025 mg (3/9/2022), 6,025 mg (2/23/2022), 5,930 mg (2/2/2022), 5,930 mg (1/19/2022), 6,050 mg (4/20/2022), 6,025 mg (4/6/2022), 6,025 mg (3/23/2022), 6,050 mg (6/1/2022), 6,050 mg (6/15/2022), 6,050 mg (10/19/2022), 6,050 mg (10/5/2022), 6,050 mg (11/2/2022), 6,050 mg (11/16/2022), 6,050 mg (11/30/2022), 6,050 mg (1/4/2023), 6,050 mg (12/19/2022), 6,050 mg (1/18/2023), 6,000 mg (5/22/2023), 6,000 mg (4/26/2023), 6,000 mg (4/11/2023), 6,000 mg (2/28/2023), 6,050 mg (2/14/2023), 6,000 mg (2/1/2023), 6,000 mg (7/5/2023), 6,000 mg (6/19/2023), 6,000 mg (6/5/2023), 6,000 mg (7/31/2023)  bevacizumab (AVASTIN) 600 mg in sodium chloride 0.9% 100 mL chemo infusion, 660 mg, Intravenous, [facility] 1 time, 36 of 36 cycles  Dose modification: 5 mg/kg (original dose 5 mg/kg, Cycle 26, Reason: MD Discretion, Comment: 5 mg/kg original dose)  Administration: 600 mg (6/30/2021), 600 mg (7/14/2021), 600 mg (7/28/2021), 600 mg (6/16/2021), 600 mg (8/11/2021), 655 mg (8/25/2021), 600 mg  (9/8/2021), 600 mg (9/22/2021), 600 mg (10/6/2021), 600 mg (10/20/2021), 600 mg (11/3/2021), 655 mg (12/1/2021), 600 mg (12/15/2021), 600 mg (11/17/2021), 600 mg (12/28/2021), 600 mg (5/11/2022), 600 mg (3/9/2022), 600 mg (2/23/2022), 600 mg (2/2/2022), 600 mg (1/19/2022), 600 mg (4/20/2022), 680 mg (4/6/2022), 600 mg (3/23/2022), 680 mg (10/5/2022), 680 mg (10/19/2022), 680 mg (11/2/2022), 680 mg (11/16/2022), 680 mg (11/30/2022), 680 mg (1/4/2023), 680 mg (12/19/2022), 680 mg (1/18/2023), 680 mg (3/28/2023), 680 mg (3/14/2023), 680 mg (2/28/2023), 680 mg (2/14/2023), 680 mg (2/1/2023)  irinotecan (CAMPTOSAR) 440 mg in sodium chloride 0.9% 500 mL chemo infusion, 446 mg, Intravenous, [facility] 1 time, 45 of 48 cycles  Dose modification: 180 mg/m2 (original dose 180 mg/m2, Cycle 24)  Administration: 440 mg (6/16/2021), 440 mg (6/30/2021), 440 mg (7/14/2021), 440 mg (7/28/2021), 440 mg (8/11/2021), 444 mg (8/25/2021), 440 mg (9/8/2021), 440 mg (9/22/2021), 440 mg (10/6/2021), 440 mg (10/20/2021), 440 mg (11/3/2021), 440 mg (12/1/2021), 440 mg (12/15/2021), 440 mg (11/17/2021), 440 mg (12/28/2021), 440 mg (5/11/2022), 440 mg (3/9/2022), 440 mg (2/23/2022), 440 mg (2/2/2022), 440 mg (1/19/2022), 440 mg (4/20/2022), 440 mg (4/6/2022), 440 mg (3/24/2022), 440 mg (6/1/2022), 440 mg (6/15/2022), 440 mg (10/19/2022), 440 mg (10/5/2022), 440 mg (11/2/2022), 440 mg (11/16/2022), 440 mg (11/30/2022), 440 mg (1/4/2023), 440 mg (12/19/2022), 440 mg (1/18/2023), 440 mg (5/22/2023), 440 mg (4/26/2023), 440 mg (4/11/2023), 440 mg (3/28/2023), 440 mg (3/14/2023), 440 mg (2/28/2023), 440 mg (2/14/2023), 440 mg (2/1/2023), 440 mg (7/5/2023), 440 mg (6/19/2023), 440 mg (6/5/2023), 440 mg (7/31/2023)  bevacizumab-awwb (MVASI) 5 mg/kg = 680 mg in sodium chloride 0.9% 100 mL infusion, 5 mg/kg = 680 mg (100 % of original dose 5 mg/kg), Intravenous, Clinic/[facility] 1 time, 7 of 10 cycles  Dose modification: 5 mg/kg (original dose 5 mg/kg, Cycle  39)  Administration: 680 mg (2023), 680 mg (2023), 680 mg (2023), 680 mg (2023), 680 mg (2023), 680 mg (2023), 680 mg (2023)     2023 - 2023 Chemotherapy    Treatment Summary   Plan Name: OP CETUXIMAB 500MG Q2W  Treatment Goal: Control  Status: Inactive  Start Date:   End Date:   Provider: Marah Santo MD  Chemotherapy: [No matching medication found in this treatment plan]     2023 -  Chemotherapy    Treatment Summary   Plan Name: [facility] - ARM 1 ENCORAFENIB  CETUXIMAB NIVOLUMAB  Treatment Goal: Palliative  Status: Active  Start Date: 2023  End Date: 2024 (Planned)  Provider: Marah Santo MD  Chemotherapy: cetuximab (ERBITUX) 100 mg/50 mL chemo infusion 1,200 mg, 1,230 mg, Intravenous, Clinic/[facility] 1 time, 4 of 12 cycles  Administration: 1,200 mg (2023), 1,200 mg (2023), 1,200 mg (2023), 1,200 mg (10/6/2023), 1,200 mg (10/20/2023), 1,200 mg (11/3/2023), 1,200 mg (2023)  encorafenib 75 mg Cap, 300 mg, Oral, Daily, 1 of 1 cycle, Start date: --, End date: --         History:  Past Medical History:   Diagnosis Date    Anxiety     Depression     FH: ovarian cancer 3/16/2020    Hx of psychiatric care     Effexor, Paxil, Lexapro, Zoloft, Wellbutrin, Trazodone Buspar    Hyperthyroidism     Hypothyroid     Kidney calculi     Malignant neoplasm of sigmoid colon 3/16/2020    Menorrhagia     Multinodular goiter 2012    Palpitation     Psychiatric problem     Venous insufficiency       Past Surgical History:   Procedure Laterality Date     SECTION, CLASSIC      x3    COLONOSCOPY N/A 2020    Procedure: COLONOSCOPY;  Surgeon: Shane Parker MD;  Location: [facility];  Service: Endoscopy;  Laterality: N/A;    COLONOSCOPY N/A 2022    Procedure: COLONOSCOPY;  Surgeon: Shane Parker MD;  Location: [facility];  Service: Endoscopy;  Laterality: N/A;    COLONOSCOPY N/A 2023    Procedure: COLONOSCOPY;  Surgeon: Shane  PAUL Parker MD;  Location: [facility];  Service: Endoscopy;  Laterality: N/A;  no cecum anastomosis    CYSTOSCOPY W/ URETERAL STENT PLACEMENT Bilateral 3/25/2020    Procedure: CYSTOSCOPY, WITH URETERAL STENT INSERTION;  Surgeon: Claudio Tyson MD;  Location: [facility] OR [address] [place] [address];  Service: Urology;  Laterality: Bilateral;    INSERTION OF TUNNELED CENTRAL VENOUS CATHETER (CVC) WITH SUBCUTANEOUS PORT N/A 5/1/2020    Procedure: [id number];  Surgeon: Dosc Diagnostic Provider;  Location: [facility] OR [address] [place] [address];  Service: Radiology;  Laterality: N/A;  Room 189/[facility]    LAPAROSCOPIC SIGMOIDECTOMY N/A 3/25/2020    Procedure: COLECTOMY, SIGMOID, LAPAROSCOPIC, flex sig, ERAS high;  Surgeon: Silvio Man MD;  Location: [facility] OR [address] [place] [address];  Service: Colon and Rectal;  Laterality: N/A;    MOBILIZATION OF SPLENIC FLEXURE  3/25/2020    Procedure: MOBILIZATION, SPLENIC FLEXURE;  Surgeon: Silvio Man MD;  [facility];  Service: Colon and Rectal;;    tonsillectomy      TOTAL ABDOMINAL HYSTERECTOMY W/ BILATERAL SALPINGOOPHORECTOMY N/A 3/25/2020    Procedure: HYSTERECTOMY, TOTAL, ABDOMINAL, WITH BILATERAL SALPINGO-OOPHORECTOMY;  Surgeon: Keron Brady MD;  Location: [facility] OR [address] [place] [address];  Service: Oncology;  Laterality: N/A;     Family History   Problem Relation Age of Onset    Heart disease Father     Diabetes Mother 65    Drug abuse Brother     Drug abuse Brother     Cancer Maternal Aunt         lung cancer    Cancer Maternal Grandmother         stomach cancer- started in ovaries    Ovarian cancer Maternal Grandmother         stomach cancer- started in ovaries    Colon cancer Paternal Uncle     Cancer Paternal Uncle     Ovarian cancer Maternal Aunt     Ovarian cancer Maternal Aunt     Ovarian cancer Cousin         mother had ovarian cancer    Drug abuse Son     Drug abuse Son         clean/sober since 2012    Colon cancer Son     No Known Problems Daughter     Uterine cancer Neg Hx     Breast cancer Neg  "Hx      Social History     Tobacco Use    Smoking status: Never    Smokeless tobacco: Never   Substance and Sexual Activity    Alcohol use: Yes     Comment: occasionally- twice monthly    Drug use: Never    Sexual activity: Yes     Partners: Male     Birth control/protection: See Surgical Hx        ROS:   Review of Systems   Constitutional:  Positive for malaise/fatigue. Negative for fever and weight loss.   HENT:  Negative for congestion, hearing loss, nosebleeds and sore throat.    Eyes:  Negative for double vision and photophobia.   Respiratory:  Negative for cough, hemoptysis, sputum production, shortness of breath and wheezing.    Cardiovascular:  Negative for chest pain, palpitations, orthopnea and leg swelling.   Gastrointestinal:  Negative for abdominal pain, blood in stool, constipation, diarrhea, heartburn and vomiting.   Genitourinary:  Negative for dysuria, frequency, hematuria and urgency.   Musculoskeletal:  Negative for back pain, joint pain and myalgias.   Skin:  Positive for rash. Negative for itching.   Neurological:  Negative for dizziness, tingling, seizures, weakness and headaches.   Endo/Heme/Allergies:  Negative for polydipsia. Does not bruise/bleed easily.   Psychiatric/Behavioral:  Negative for depression and memory loss. The patient is nervous/anxious and has insomnia (improving).         Objective:     Vitals:    11/30/23 0918   BP: 134/80   Pulse: 105   Resp: (!) 22   Temp: 98.1 °F (36.7 °C)   TempSrc: Oral   SpO2: 95%   Weight: 129.9 kg (286 lb 6 oz)   Height: 5' 6" (1.676 m)   PainSc: 0-No pain       Wt Readings from Last 10 Encounters:   11/30/23 129.9 kg (286 lb 6 oz)   11/22/23 129.6 kg (285 lb 11.5 oz)   11/17/23 129.9 kg (286 lb 6 oz)   11/16/23 129.9 kg (286 lb 6 oz)   11/03/23 130 kg (286 lb 9.6 oz)   11/02/23 130 kg (286 lb 9.6 oz)   10/30/23 128 kg (282 lb 3 oz)   10/20/23 128 kg (282 lb 3 oz)   10/19/23 128 kg (282 lb 3 oz)   10/18/23 129.2 kg (284 lb 13.4 oz)       Physical " Examination:   Physical Exam  Vitals and nursing note reviewed.   Constitutional:       General: She is not in acute distress.     Appearance: She is not diaphoretic.   HENT:      Head: Normocephalic.      Mouth/Throat:      Pharynx: No oropharyngeal exudate.   Eyes:      General: No scleral icterus.     Conjunctiva/sclera: Conjunctivae normal.   Neck:      Thyroid: No thyromegaly.   Cardiovascular:      Rate and Rhythm: Normal rate and regular rhythm.      Heart sounds: Normal heart sounds. No murmur heard.  Pulmonary:      Effort: Pulmonary effort is normal. No respiratory distress.      Breath sounds: No stridor. No wheezing or rales.   Chest:      Chest wall: No tenderness.   Abdominal:      General: Bowel sounds are normal. There is no distension.      Palpations: Abdomen is soft. There is no mass.      Tenderness: There is no abdominal tenderness. There is no rebound.   Musculoskeletal:         General: No tenderness or deformity. Normal range of motion.      Cervical back: Neck supple.   Lymphadenopathy:      Cervical: No cervical adenopathy.   Skin:     General: Skin is warm and dry.      Findings: Rash (grade 1 facial rash, at baseline) present. No erythema.   Neurological:      Mental Status: She is alert and oriented to person, place, and time.      Cranial Nerves: No cranial nerve deficit.      Coordination: Coordination normal.      Gait: Gait is intact.   Psychiatric:         Mood and Affect: Affect normal.         Cognition and Memory: Memory normal.         Judgment: Judgment normal.          Diagnostic Tests:  Significant Imaging: I have reviewed and interpreted all pertinent imaging results/findings.  Laboratory Data:  All pertinent labs have been reviewed.    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 Oxaliplatin was stopped due to severe adverse reaction.     Follow-up CTs and PET in May 2021 showed enlarging retroperitoneal node, concerning for metastatic disease.      She started FOLFIRI + Avastin and has continued till July 2022.   Given isolated RP toni disease, she has undergone open Left periaortic and aortocaval lymph node dissection on 7/12/2022 at [facility]. Resumed FOLFIRI+ Debo with relatively stable disease but now has disease progression on scans in August 2023.     She has been enrolled into  SWOG 2107 (encorafenib/cetuximab + nivo) , cleared for cycle 4, D15. On additional pre medications given infusion reaction to Cetuximab, continue to monitor closely.     Scans to be repeated prior to the next cycle. Will follow up.     Grade 1 dermatitis   Noted, continue topical hydrocortisone and clindamycin gel, remains on oral doxycycline.     Neoplasm pain  Improving. Follow up with palliative care.     Hydronephrosis   Follow up with Urology for co management. No acute intervention was recommended.     Nausea   Continue compazine as needed, improving.     Constipation  Start sennakot and daily Miralax.     Transaminitis  Fluctuates.  Continue to monitor. No obvious liver mets.     Hypercalcemia   Mild, secondary to hyperparathyroidism.  Avoid calcium supplements. Continue Endocrine follow up.     Hypothyroidism  Multinodular goiter   Continue Levothyroxine. Recent dose adjustment noted.   Had a non diagnostic biopsy in 2012,  usg findings have remained stable. Reviewed repeat thyroid US, stable, will follow up with Endocrine.     Essential HTN   Continue antihypertensives.      Anxiety   Continue to  follow up with Onc psych.    Vaginal candidosis   Responded to fluconazole, monitor.    MDM includes  :    - Acute or chronic illness or injury that poses a threat to life or bodily function  - Independent review and explanation of 3+ results from unique tests  - Discussion of management and ordering 3+ unique tests  - Extensive discussion of treatment and management  - Prescription drug management  - Drug therapy requiring intensive monitoring for toxicity          ECOG SCORE               Discussion:   No follow-ups on file.    Plan was discussed with the patient at length, and she verbalized understanding. Gail was given an opportunity to ask questions that were answered to her satisfaction, and she was advised to call in the interval if any problems or questions arise.    Electronically signed by Marah Santo MD        Route Chart for Scheduling    Med Onc Chart Routing      Follow up with physician . Scheduled   Follow up with TAVO    Infusion scheduling note    Injection scheduling note    Labs    Imaging    Pharmacy appointment    Other referrals                  Treatment Plan Information   [facility] - ARM 1 ENCORAFENIB  CETUXIMAB NIVOLUMAB   Marah Santo MD   Upcoming Treatment Dates - [facility] - ARM 1 ENCORAFENIB  CETUXIMAB NIVOLUMAB    12/1/2023       Pre-Medications       ondansetron injection 8 mg       hydrocortisone sodium succinate injection 50 mg       diphenhydrAMINE (BENADRYL) 50 mg in NS 50 mL IVPB       acetaminophen tablet 650 mg       Chemotherapy       cetuximab (ERBITUX) 100 mg/50 mL chemo infusion 1,230 mg  12/15/2023       Pre-Medications       ondansetron injection 8 mg       hydrocortisone sodium succinate injection 50 mg       diphenhydrAMINE (BENADRYL) 50 mg in NS 50 mL IVPB       acetaminophen tablet 650  mg       Chemotherapy       cetuximab (ERBITUX) 100 mg/50 mL chemo infusion 1,230 mg       INV nivolumab 480 mg in INV sodium chloride 0.9 % 100 mL chemo infusion  12/29/2023       Pre-Medications       ondansetron injection 8 mg       hydrocortisone sodium succinate injection 50 mg       diphenhydrAMINE (BENADRYL) 50 mg in NS 50 mL IVPB       acetaminophen tablet 650 mg       Chemotherapy       cetuximab (ERBITUX) 100 mg/50 mL chemo infusion 1,230 mg  1/12/2024       Pre-Medications       ondansetron injection 8 mg       hydrocortisone sodium succinate injection 50 mg       diphenhydrAMINE (BENADRYL) 50 mg in NS 50 mL IVPB       acetaminophen tablet 650 mg       Chemotherapy       cetuximab (ERBITUX) 100 mg/50 mL chemo infusion 1,230 mg       INV nivolumab 480 mg in INV sodium chloride 0.9 % 100 mL chemo infusion    Supportive Plan Information  IV FLUIDS AND ELECTROLYTES   Brayden Solo MD   Upcoming Treatment Dates - IV FLUIDS AND ELECTROLYTES    No upcoming days in selected categories.    Therapy Plan Information  PORT FLUSH  Flushes  heparin, porcine (PF) 100 unit/mL injection flush 500 Units  500 Units, Intravenous, Every visit  sodium chloride 0.9% flush 10 mL  10 mL, Intravenous, Every visit          Answers submitted by the patient for this visit:  Review of Systems Questionnaire (Submitted on 11/29/2023)  appetite change : No  unexpected weight change: No  mouth sores: No  visual disturbance: No  adenopathy: No

## 2023-12-01 ENCOUNTER — PATIENT MESSAGE (OUTPATIENT)
Dept: HEMATOLOGY/ONCOLOGY | Facility: CLINIC | Age: 53
End: 2023-12-01
Payer: MEDICAID

## 2023-12-01 ENCOUNTER — RESEARCH ENCOUNTER (OUTPATIENT)
Dept: RESEARCH | Facility: HOSPITAL | Age: 53
End: 2023-12-01
Payer: MEDICAID

## 2023-12-01 ENCOUNTER — DOCUMENTATION ONLY (OUTPATIENT)
Dept: INFUSION THERAPY | Facility: HOSPITAL | Age: 53
End: 2023-12-01
Payer: MEDICAID

## 2023-12-01 ENCOUNTER — INFUSION (OUTPATIENT)
Dept: INFUSION THERAPY | Facility: HOSPITAL | Age: 53
End: 2023-12-01
Attending: INTERNAL MEDICINE
Payer: MEDICAID

## 2023-12-01 VITALS
DIASTOLIC BLOOD PRESSURE: 74 MMHG | TEMPERATURE: 99 F | WEIGHT: 286.38 LBS | SYSTOLIC BLOOD PRESSURE: 121 MMHG | HEART RATE: 102 BPM | BODY MASS INDEX: 46.02 KG/M2 | HEIGHT: 66 IN | RESPIRATION RATE: 18 BRPM

## 2023-12-01 DIAGNOSIS — C18.7 MALIGNANT NEOPLASM OF SIGMOID COLON: Primary | ICD-10-CM

## 2023-12-01 DIAGNOSIS — C77.2 METASTASIS TO INTESTINAL LYMPH NODE: ICD-10-CM

## 2023-12-01 PROCEDURE — 96415 CHEMO IV INFUSION ADDL HR: CPT | Mod: PN

## 2023-12-01 PROCEDURE — 96375 TX/PRO/DX INJ NEW DRUG ADDON: CPT | Mod: PN

## 2023-12-01 PROCEDURE — 96413 CHEMO IV INFUSION 1 HR: CPT | Mod: PN

## 2023-12-01 PROCEDURE — 63600175 PHARM REV CODE 636 W HCPCS: Mod: PN | Performed by: INTERNAL MEDICINE

## 2023-12-01 PROCEDURE — 96367 TX/PROPH/DG ADDL SEQ IV INF: CPT | Mod: PN

## 2023-12-01 PROCEDURE — 25000003 PHARM REV CODE 250: Mod: PN | Performed by: INTERNAL MEDICINE

## 2023-12-01 RX ORDER — ONDANSETRON 2 MG/ML
8 INJECTION INTRAMUSCULAR; INTRAVENOUS
Status: COMPLETED | OUTPATIENT
Start: 2023-12-01 | End: 2023-12-01

## 2023-12-01 RX ORDER — HEPARIN 100 UNIT/ML
500 SYRINGE INTRAVENOUS
Status: DISCONTINUED | OUTPATIENT
Start: 2023-12-01 | End: 2023-12-01 | Stop reason: HOSPADM

## 2023-12-01 RX ORDER — ACETAMINOPHEN 325 MG/1
650 TABLET ORAL
Status: COMPLETED | OUTPATIENT
Start: 2023-12-01 | End: 2023-12-01

## 2023-12-01 RX ADMIN — ACETAMINOPHEN 650 MG: 325 TABLET, FILM COATED ORAL at 09:12

## 2023-12-01 RX ADMIN — HYDROCORTISONE SODIUM SUCCINATE 50 MG: 100 INJECTION, POWDER, FOR SOLUTION INTRAMUSCULAR; INTRAVENOUS at 09:12

## 2023-12-01 RX ADMIN — Medication 500 UNITS: at 03:12

## 2023-12-01 RX ADMIN — CETUXIMAB 1200 MG: 2 SOLUTION INTRAVENOUS at 11:12

## 2023-12-01 RX ADMIN — ONDANSETRON 8 MG: 2 INJECTION INTRAMUSCULAR; INTRAVENOUS at 09:12

## 2023-12-01 RX ADMIN — SODIUM CHLORIDE: 9 INJECTION, SOLUTION INTRAVENOUS at 09:12

## 2023-12-01 RX ADMIN — DIPHENHYDRAMINE HYDROCHLORIDE 50 MG: 50 INJECTION, SOLUTION INTRAMUSCULAR; INTRAVENOUS at 09:12

## 2023-12-01 NOTE — PROGRESS NOTES
Oncology Nutrition   Chemotherapy Infusion Visit    Nutrition Follow Up   RD met with patient at chairside during infusion treatment. Pt eligible for TFP- preferences discussed and order will be provided to patient at the end of infusion.     RD was asked to speak with patient today about foods high in iron. List provided and ways to increase absorption such as consuming high vitamin C foods at the same time discussed. Pt states she is not on supplement yet; hoping to not have to do that because when she was on it previously it really messed up her stomach. RD educated patient on some options that are ER and are supposed to be easier on the stomach. Pt very appreciative of RD visit and time.     Wt Readings from Last 10 Encounters:   12/01/23 129.9 kg (286 lb 6 oz)   11/30/23 129.9 kg (286 lb 6 oz)   11/22/23 129.6 kg (285 lb 11.5 oz)   11/17/23 129.9 kg (286 lb 6 oz)   11/16/23 129.9 kg (286 lb 6 oz)   11/03/23 130 kg (286 lb 9.6 oz)   11/02/23 130 kg (286 lb 9.6 oz)   10/30/23 128 kg (282 lb 3 oz)   10/20/23 128 kg (282 lb 3 oz)   10/19/23 128 kg (282 lb 3 oz)       All other nutrition questions/concerns addressed as appropriate. Will continue to follow and monitor throughout treatment PRN.     Raquel Bullock MS, RD, LDN  12/01/2023  2:52 PM

## 2023-12-01 NOTE — PROGRESS NOTES
December 1st, 2023     Randomized Phase II Trial of Encorafenib and Cetuximab with or Without Nivolumab (NSC #[id number]) for Patients with Previously Treated, Microsatellite Stable BRAF V600E Metastatic and/or Unresectable Colorectal Cancer     C 4 Day 15  December 1st, 2023.     Randomized Phase II Trial of Encorafenib and Cetuximab with or Without Nivolumab (NSC #[id number]) for Patients with Previously Treated, Microsatellite Stable BRAF V600E Metastatic and/or Unresectable Colorectal Cancer     C 4 Day 15 December 1st, 2023.     The patient was seen by the treating MD yesterday and deemed eligible for C4 D15.     The patient presented to the [facility] 3rd floor infusion center this am for cycle 4 Day 15 protocol treatment. - MAR attached. Intake vital signs WNL except elevated pulse rate. Post infusion pulse rate 102 bpm. Per infusion nurse the patient tolerated treatment without noted changes during therapy.     The patient was alert, oriented, without noted distress, with appropriate mood and affect for the situation, and freely agreed to continue with participating in the referenced study and consented to blood specimens for banking.  The patient denies any changes in health or medications since yesterday's office visit with Dr. Santo.  [id number] pre-tx labs: CBC w/ diff, CMP, Magnesium resulted and reviewed by this CRC and the treating MD at yesterday's office visit.   Endocrinology following TSH for patient's baseline Grade II Hypothyroidism (resulted from treatment for Hyperthyroidism). See communication from study chair regarding TSH monitoring for this patient in Cycle 1 section of shadow chart.   Per Section 7.4- Administration of cetuximab will be dosed every 14 days (+/- 7 days).   This CRC reviewed upcoming appointments with the patient and encouraged to call with any new symptoms or issues, the patient verbalized understanding. Patient made aware to call after hour number, that she agrees to having, if after  business hours or over the weekend.      Per Infusion nurse, the patient tolerated C4D5 treatment without any noted adverse events.      Next protocol required bio-specimen (Whole Blood) due at progression of disease. (Section 9.0 Study Calendar)     Next Every 8-week CT C/A/P (Section 7.5): 12/04/2023 @ 0930 (patient informed)     Cycle 5 Day 1:     [id number] [facility] floor lab @ 12:55   [id number] OV with Dr. Santo @ 10:00   [id number] Infusion at 08:30         CHANDRIKA Adams RN

## 2023-12-01 NOTE — PROGRESS NOTES
SW met with pt and provided a gas card. Pt asked SW to assist in scanning pt's application to [employer] ([facility] Pt originally mailed the application in but when she called to follow up they did not receive it.     SW scanned the application for pt and emailed it to her so she can email the application to [employer] from her email. This will ensure the pt can correspond with [employer] herself.     Pt denied any further needs at this time.     Radha Giordano, JENNIEW

## 2023-12-04 ENCOUNTER — TELEPHONE (OUTPATIENT)
Dept: HEMATOLOGY/ONCOLOGY | Facility: CLINIC | Age: 53
End: 2023-12-04
Payer: MEDICAID

## 2023-12-04 ENCOUNTER — PATIENT MESSAGE (OUTPATIENT)
Dept: HEMATOLOGY/ONCOLOGY | Facility: CLINIC | Age: 53
End: 2023-12-04
Payer: MEDICAID

## 2023-12-04 ENCOUNTER — HOSPITAL ENCOUNTER (OUTPATIENT)
Dept: RADIOLOGY | Facility: HOSPITAL | Age: 53
Discharge: HOME | End: 2023-12-04
Payer: MEDICAID

## 2023-12-04 DIAGNOSIS — C18.7 MALIGNANT NEOPLASM OF SIGMOID COLON: ICD-10-CM

## 2023-12-04 PROCEDURE — 74177 CT ABD & PELVIS W/CONTRAST: CPT | Mod: 26,,, | Performed by: RADIOLOGY

## 2023-12-04 PROCEDURE — 71260 CT THORAX DX C+: CPT | Mod: TC,PO

## 2023-12-04 PROCEDURE — 71260 CT CHEST ABDOMEN PELVIS WITH CONTRAST (XPD): ICD-10-PCS | Mod: 26,,, | Performed by: RADIOLOGY

## 2023-12-04 PROCEDURE — A9698 NON-RAD CONTRAST MATERIALNOC: HCPCS | Mod: PO

## 2023-12-04 PROCEDURE — 25500020 PHARM REV CODE 255: Mod: PO

## 2023-12-04 PROCEDURE — 74177 CT ABD & PELVIS W/CONTRAST: CPT | Mod: TC,PO

## 2023-12-04 PROCEDURE — 71260 CT THORAX DX C+: CPT | Mod: 26,,, | Performed by: RADIOLOGY

## 2023-12-04 PROCEDURE — 74177 CT CHEST ABDOMEN PELVIS WITH CONTRAST (XPD): ICD-10-PCS | Mod: 26,,, | Performed by: RADIOLOGY

## 2023-12-04 RX ADMIN — IOHEXOL 1000 ML: 12 SOLUTION ORAL at 09:12

## 2023-12-04 RX ADMIN — IOHEXOL 100 ML: 350 INJECTION, SOLUTION INTRAVENOUS at 09:12

## 2023-12-04 NOTE — TELEPHONE ENCOUNTER
Returned call to pt to let her know that we don't have any cancellations today for PT before her 1:00 pm apt

## 2023-12-06 DIAGNOSIS — Z00.6 EXAMINATION OF PARTICIPANT IN CLINICAL TRIAL: ICD-10-CM

## 2023-12-06 DIAGNOSIS — C18.7 MALIGNANT NEOPLASM OF SIGMOID COLON: ICD-10-CM

## 2023-12-06 DIAGNOSIS — C18.7 MALIGNANT NEOPLASM OF SIGMOID COLON: Primary | ICD-10-CM

## 2023-12-06 RX ORDER — ENCORAFENIB 75 MG/1
300 CAPSULE ORAL DAILY
Qty: 120 CAPSULE | Refills: 0 | Status: CANCELLED | OUTPATIENT
Start: 2023-12-06

## 2023-12-07 ENCOUNTER — TUMOR BOARD CONFERENCE (OUTPATIENT)
Dept: HEMATOLOGY/ONCOLOGY | Facility: CLINIC | Age: 53
End: 2023-12-07
Payer: MEDICAID

## 2023-12-07 DIAGNOSIS — C18.7 MALIGNANT NEOPLASM OF SIGMOID COLON: ICD-10-CM

## 2023-12-07 DIAGNOSIS — Z00.6 EXAMINATION OF PARTICIPANT IN CLINICAL TRIAL: ICD-10-CM

## 2023-12-07 NOTE — NURSING
[facility]    Upper GI Tumor Board Note      Date: 12/07/2023  MRN: [id number]  Site: Sigmoid Colon  Presenter: Brayden Solo MD    Summary: Gail Mckinnon is a 53 y.o. female who presents with colon cancer, initially stage III and now with LN recurrence.    On FOLIRI+ Avastin sine 5/21.    She was briefly switched to xeloda due to 5 FU shortage for a month. Did not tolerate Xeloda well due hand foot syndrome, blistering of feet, did not take the last day of Xeloda. Completed 4/1.    Resumed Folfiri + Avastin on 4/26/23. Atropine was held due to prior constipation.    Was admitted to the hospital from 5/2-5/12/23 for intractable nausea , vomiting and diarrhea as well as abdominal pain. No hematochezia or dark stool was noted.    US showed gallbladder stone and dilated CBD. She was managed conservatively. Had CT abd, pelvis, colonoscopy and MRCP that were relatively unremarkable without signs of cholecystitis. Cholecystectomy was not recommended.    She had recently resumed FOLFIRI and the symptoms started after the first cycle of resuming, never had issues prior to this.  Stayed in the hospital for 10 days.  C diff was negative. No other etiology was established. Symptoms improved over time and she was discharged on 5/12.    Interim history:  Enrolled in  SWOG 2107 (encorafenib/cetuximab and randomized to the Nivolumab arm), here to obtain for cycle 4, day 15.   Staging scans are done as per research protocol first completed on 10/9, consistent with response to treatment. To be repeated every 8 weeks , next due 12/4.    Felt dizzy for 1-2 days, felt dehydrated, took some Pedialyte.   Denies n/v/d/c.    Reports stable fatigue, skin rash, on doxycycline. Mostly facial rash. Has noticed vaginal yeast infection, resolved on fluconazole.      No recurrence of abdominal pain. No further infusion reactions. Has stable fatigue, rash. Insomnia is better.     Noted Grave's disease history in 1998, treated with  LEE, now on synthroid. Recent increase in the dose of levothyroxine.    Oncology History:  She completed adjuvant FOLFOX in November 2020. She tolerated only 4 cycles of FOLFOX before she developed an infusion reaction to oxaliplatin in cycle 5 was well as cycle 6. She then completed 6 cycles of infusional 5 FU.   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 She was offered second line therapy with FOLFIRI and Avastin.    She presented to the ED on 11/26 with left flank pain. CT renal stone study showed Moderate hydronephrosis on the left secondary to 3 mm calculus at the UPJ.   She had a PET scan mid April that showed possible progression of her disease with     She has been to [facility] for another opinion. No change was recommended in systemic therapy but she was offered surgical removal of the aorta caval nodes.  Recent sans at [facility]  show stable disease.     Surgery done 7/12/22. Received 2 more cycle of FOLFIRI without Avastin.    She had repeat imaging at [facility] on 9/24/22 that unfortunately showed disease progression since her surgery with multiple RP nodes and one mediastinal node.     Recommendations: PET scan to review    Consult: Hem/Onc and Radiology    Stage:     Tumor: rcT0 Node(s): cNX Metastasis: cM1     Group Stage: Local recurrence    Unstageable: No    Metastatic site(s): lymph nodes    [facility] Treatment Guidelines reviewed and care plan is consistent with guidelines, i.e., NCCN, NCL, PDQ, ASCO, AUA, etc.    Presentation at Cancer Conference: Prospective    Discussed possible entry into Clinical Trial: No  Physician Name: Brayden Solo MD

## 2023-12-10 ENCOUNTER — PATIENT MESSAGE (OUTPATIENT)
Dept: ADMINISTRATIVE | Facility: OTHER | Age: 53
End: 2023-12-10
Payer: MEDICAID

## 2023-12-10 DIAGNOSIS — Z51.5 PALLIATIVE CARE BY SPECIALIST: ICD-10-CM

## 2023-12-10 DIAGNOSIS — F32.A DEPRESSIVE DISORDER: ICD-10-CM

## 2023-12-10 DIAGNOSIS — C18.7 MALIGNANT NEOPLASM OF SIGMOID COLON: ICD-10-CM

## 2023-12-10 DIAGNOSIS — R11.0 NAUSEA: ICD-10-CM

## 2023-12-11 RX ORDER — DULOXETIN HYDROCHLORIDE 30 MG/1
60 CAPSULE, DELAYED RELEASE ORAL DAILY
Qty: 60 CAPSULE | Refills: 0 | Status: SHIPPED | OUTPATIENT
Start: 2023-12-11 | End: 2024-03-21

## 2023-12-12 RX ORDER — LORAZEPAM 1 MG/1
1 TABLET ORAL EVERY 12 HOURS PRN
Qty: 30 TABLET | Refills: 0 | Status: SHIPPED | OUTPATIENT
Start: 2023-12-12 | End: 2024-01-08 | Stop reason: SDUPTHER

## 2023-12-13 ENCOUNTER — HOSPITAL ENCOUNTER (OUTPATIENT)
Dept: RADIOLOGY | Facility: HOSPITAL | Age: 53
Discharge: HOME | End: 2023-12-13
Attending: INTERNAL MEDICINE
Payer: MEDICAID

## 2023-12-13 DIAGNOSIS — Z00.6 EXAMINATION OF PARTICIPANT IN CLINICAL TRIAL: ICD-10-CM

## 2023-12-13 DIAGNOSIS — C18.7 MALIGNANT NEOPLASM OF SIGMOID COLON: ICD-10-CM

## 2023-12-13 DIAGNOSIS — E21.0 PRIMARY HYPERPARATHYROIDISM: ICD-10-CM

## 2023-12-13 DIAGNOSIS — I10 HYPERTENSION, UNSPECIFIED TYPE: Primary | ICD-10-CM

## 2023-12-13 LAB — GLUCOSE SERPL-MCNC: 83 MG/DL (ref 70–110)

## 2023-12-13 PROCEDURE — 78815 NM PET CT FDG SKULL BASE TO MID THIGH: ICD-10-PCS | Mod: 26,PS,, | Performed by: STUDENT IN AN ORGANIZED HEALTH CARE EDUCATION/TRAINING PROGRAM

## 2023-12-13 PROCEDURE — 77081 DEXA BONE DENSITY APPENDICULAR SKELETON: ICD-10-PCS | Mod: 26,XU,, | Performed by: RADIOLOGY

## 2023-12-13 PROCEDURE — 77081 DXA BONE DENSITY APPENDICULR: CPT | Mod: TC,PO

## 2023-12-13 PROCEDURE — 77080 DXA BONE DENSITY AXIAL SKELETON 1 OR MORE SITES: ICD-10-PCS | Mod: 26,,, | Performed by: RADIOLOGY

## 2023-12-13 PROCEDURE — 77081 DXA BONE DENSITY APPENDICULR: CPT | Mod: 26,XU,, | Performed by: RADIOLOGY

## 2023-12-13 PROCEDURE — 77080 DXA BONE DENSITY AXIAL: CPT | Mod: 26,,, | Performed by: RADIOLOGY

## 2023-12-13 PROCEDURE — 77080 DXA BONE DENSITY AXIAL: CPT | Mod: TC,PO

## 2023-12-13 PROCEDURE — 78815 PET IMAGE W/CT SKULL-THIGH: CPT | Mod: 26,PS,, | Performed by: STUDENT IN AN ORGANIZED HEALTH CARE EDUCATION/TRAINING PROGRAM

## 2023-12-13 PROCEDURE — A9552 F18 FDG: HCPCS | Mod: PN

## 2023-12-13 NOTE — PROGRESS NOTES
PET Imaging Questionnaire    Are you a Diabetic? Recent Blood Sugar level? No    Are you anemic? Bone Marrow Stimulation Meds? Yes    Have you had a CT Scan, if so when & where was your last one? Yes -     Have you had a PET Scan, if so when & where was your last one? Yes -     Chemotherapy or currently on Chemotherapy? Yes    Radiation therapy? No    Surgical History:   Past Surgical History:   Procedure Laterality Date     SECTION, CLASSIC      x3    COLONOSCOPY N/A 2020    Procedure: COLONOSCOPY;  Surgeon: Shane Parker MD;  Location: [facility];  Service: Endoscopy;  Laterality: N/A;    COLONOSCOPY N/A 2022    Procedure: COLONOSCOPY;  Surgeon: Shane Parker MD;  Location: [facility] ENDO;  Service: Endoscopy;  Laterality: N/A;    COLONOSCOPY N/A 2023    Procedure: COLONOSCOPY;  Surgeon: Shane Parker MD;  Location: [facility];  Service: Endoscopy;  Laterality: N/A;  no cecum anastomosis    CYSTOSCOPY W/ URETERAL STENT PLACEMENT Bilateral 3/25/2020    Procedure: CYSTOSCOPY, WITH URETERAL STENT INSERTION;  Surgeon: Claudio Tyson MD;  Location: [facility] OR [address] [place] [address];  Service: Urology;  Laterality: Bilateral;    INSERTION OF TUNNELED CENTRAL VENOUS CATHETER (CVC) WITH SUBCUTANEOUS PORT N/A 2020    Procedure: [id number];  Surgeon: [facility];  Location: [facility] OR [address] [place] [address];  Service: Radiology;  Laterality: N/A;  Room 189/[facility]    LAPAROSCOPIC SIGMOIDECTOMY N/A 3/25/2020    Procedure: COLECTOMY, SIGMOID, LAPAROSCOPIC, flex sig, ERAS high;  Surgeon: Silvio Man MD;  [facility];  Service: Colon and Rectal;  Laterality: N/A;    MOBILIZATION OF SPLENIC FLEXURE  3/25/2020    Procedure: MOBILIZATION, SPLENIC FLEXURE;  Surgeon: Silvio Man MD;  Location: [facility] OR [address] [place] [address];  Service: Colon and Rectal;;    tonsillectomy      TOTAL ABDOMINAL HYSTERECTOMY W/ BILATERAL SALPINGOOPHORECTOMY N/A 3/25/2020    Procedure: HYSTERECTOMY, TOTAL,  ABDOMINAL, WITH BILATERAL SALPINGO-OOPHORECTOMY;  Surgeon: Keron Brady MD;  Location: [facility] OR [address] [place] [address];  Service: Oncology;  Laterality: N/A;        Have you been fasting for at least 6 hours? Yes    Is there any chance you may be pregnant or breastfeeding? No    Assay: 11.43 MCi@:13.05   Injection Site:lt ac     Residual: .608 mCi@: 13.07   Technologist: Ale Box Injected:10.82mCi

## 2023-12-14 ENCOUNTER — LAB VISIT (OUTPATIENT)
Dept: LAB | Facility: HOSPITAL | Age: 53
End: 2023-12-14
Attending: INTERNAL MEDICINE
Payer: MEDICAID

## 2023-12-14 ENCOUNTER — RESEARCH ENCOUNTER (OUTPATIENT)
Dept: RESEARCH | Facility: HOSPITAL | Age: 53
End: 2023-12-14
Payer: MEDICAID

## 2023-12-14 ENCOUNTER — CLINICAL SUPPORT (OUTPATIENT)
Dept: REHABILITATION | Facility: HOSPITAL | Age: 53
End: 2023-12-14
Payer: MEDICAID

## 2023-12-14 ENCOUNTER — PATIENT MESSAGE (OUTPATIENT)
Dept: PALLIATIVE MEDICINE | Facility: CLINIC | Age: 53
End: 2023-12-14
Payer: MEDICAID

## 2023-12-14 ENCOUNTER — OFFICE VISIT (OUTPATIENT)
Dept: HEMATOLOGY/ONCOLOGY | Facility: CLINIC | Age: 53
End: 2023-12-14
Payer: MEDICAID

## 2023-12-14 ENCOUNTER — TELEPHONE (OUTPATIENT)
Dept: ENDOCRINOLOGY | Facility: CLINIC | Age: 53
End: 2023-12-14
Payer: MEDICAID

## 2023-12-14 VITALS
WEIGHT: 283.75 LBS | TEMPERATURE: 98 F | OXYGEN SATURATION: 94 % | DIASTOLIC BLOOD PRESSURE: 69 MMHG | HEIGHT: 66 IN | HEART RATE: 100 BPM | SYSTOLIC BLOOD PRESSURE: 116 MMHG | BODY MASS INDEX: 45.6 KG/M2 | RESPIRATION RATE: 20 BRPM

## 2023-12-14 DIAGNOSIS — Z00.6 EXAMINATION OF PARTICIPANT IN CLINICAL TRIAL: ICD-10-CM

## 2023-12-14 DIAGNOSIS — T45.1X5A CHEMOTHERAPY-INDUCED FATIGUE: ICD-10-CM

## 2023-12-14 DIAGNOSIS — C18.7 MALIGNANT NEOPLASM OF SIGMOID COLON: ICD-10-CM

## 2023-12-14 DIAGNOSIS — R53.83 CHEMOTHERAPY-INDUCED FATIGUE: ICD-10-CM

## 2023-12-14 DIAGNOSIS — C78.6 SECONDARY MALIGNANT NEOPLASM OF RETROPERITONEUM: Primary | ICD-10-CM

## 2023-12-14 DIAGNOSIS — C77.2 METASTASIS TO INTESTINAL LYMPH NODE: ICD-10-CM

## 2023-12-14 DIAGNOSIS — K59.1 FUNCTIONAL DIARRHEA: ICD-10-CM

## 2023-12-14 DIAGNOSIS — C18.7 MALIGNANT NEOPLASM OF SIGMOID COLON: Primary | ICD-10-CM

## 2023-12-14 DIAGNOSIS — C78.6 SECONDARY MALIGNANT NEOPLASM OF RETROPERITONEUM: ICD-10-CM

## 2023-12-14 DIAGNOSIS — F32.A DEPRESSIVE DISORDER: ICD-10-CM

## 2023-12-14 DIAGNOSIS — G89.29 CHRONIC MIDLINE LOW BACK PAIN WITHOUT SCIATICA: ICD-10-CM

## 2023-12-14 DIAGNOSIS — T45.1X5A IMMUNODEFICIENCY DUE TO CHEMOTHERAPY: ICD-10-CM

## 2023-12-14 DIAGNOSIS — Z79.899 IMMUNODEFICIENCY DUE TO CHEMOTHERAPY: ICD-10-CM

## 2023-12-14 DIAGNOSIS — R74.01 TRANSAMINITIS: ICD-10-CM

## 2023-12-14 DIAGNOSIS — C77.9 SECONDARY ADENOCARCINOMA OF LYMPH NODE: ICD-10-CM

## 2023-12-14 DIAGNOSIS — M54.50 CHRONIC MIDLINE LOW BACK PAIN WITHOUT SCIATICA: ICD-10-CM

## 2023-12-14 DIAGNOSIS — E03.8 OTHER SPECIFIED HYPOTHYROIDISM: Primary | ICD-10-CM

## 2023-12-14 DIAGNOSIS — R30.0 DYSURIA: ICD-10-CM

## 2023-12-14 DIAGNOSIS — D84.821 IMMUNODEFICIENCY DUE TO CHEMOTHERAPY: ICD-10-CM

## 2023-12-14 LAB
BACTERIA #/AREA URNS HPF: NORMAL /HPF
BILIRUB UR QL STRIP: NEGATIVE
CLARITY UR: CLEAR
COLOR UR: YELLOW
GLUCOSE UR QL STRIP: NEGATIVE
HGB UR QL STRIP: ABNORMAL
KETONES UR QL STRIP: NEGATIVE
LEUKOCYTE ESTERASE UR QL STRIP: NEGATIVE
MICROSCOPIC COMMENT: NORMAL
NITRITE UR QL STRIP: NEGATIVE
PH UR STRIP: 6 [PH] (ref 5–8)
PROT UR QL STRIP: NEGATIVE
RBC #/AREA URNS HPF: 1 /HPF (ref 0–4)
SP GR UR STRIP: 1.01 (ref 1–1.03)
SQUAMOUS #/AREA URNS HPF: 1 /HPF
URN SPEC COLLECT METH UR: ABNORMAL
WBC #/AREA URNS HPF: 0 /HPF (ref 0–5)

## 2023-12-14 PROCEDURE — 99999 PR PBB SHADOW E&M-EST. PATIENT-LVL IV: ICD-10-PCS | Mod: PBBFAC,,, | Performed by: INTERNAL MEDICINE

## 2023-12-14 PROCEDURE — 99999 PR PBB SHADOW E&M-EST. PATIENT-LVL IV: CPT | Mod: PBBFAC,,, | Performed by: INTERNAL MEDICINE

## 2023-12-14 PROCEDURE — 3078F PR MOST RECENT DIASTOLIC BLOOD PRESSURE < 80 MM HG: ICD-10-PCS | Mod: CPTII,,, | Performed by: INTERNAL MEDICINE

## 2023-12-14 PROCEDURE — 3008F BODY MASS INDEX DOCD: CPT | Mod: CPTII,,, | Performed by: INTERNAL MEDICINE

## 2023-12-14 PROCEDURE — 4010F PR ACE/ARB THEARPY RXD/TAKEN: ICD-10-PCS | Mod: CPTII,,, | Performed by: INTERNAL MEDICINE

## 2023-12-14 PROCEDURE — 99215 PR OFFICE/OUTPT VISIT, EST, LEVL V, 40-54 MIN: ICD-10-PCS | Mod: S$PBB,,, | Performed by: INTERNAL MEDICINE

## 2023-12-14 PROCEDURE — 3008F PR BODY MASS INDEX (BMI) DOCUMENTED: ICD-10-PCS | Mod: CPTII,,, | Performed by: INTERNAL MEDICINE

## 2023-12-14 PROCEDURE — 81000 URINALYSIS NONAUTO W/SCOPE: CPT | Mod: PN | Performed by: INTERNAL MEDICINE

## 2023-12-14 PROCEDURE — 99215 OFFICE O/P EST HI 40 MIN: CPT | Mod: S$PBB,,, | Performed by: INTERNAL MEDICINE

## 2023-12-14 PROCEDURE — 4010F ACE/ARB THERAPY RXD/TAKEN: CPT | Mod: CPTII,,, | Performed by: INTERNAL MEDICINE

## 2023-12-14 PROCEDURE — 3074F PR MOST RECENT SYSTOLIC BLOOD PRESSURE < 130 MM HG: ICD-10-PCS | Mod: CPTII,,, | Performed by: INTERNAL MEDICINE

## 2023-12-14 PROCEDURE — 3078F DIAST BP <80 MM HG: CPT | Mod: CPTII,,, | Performed by: INTERNAL MEDICINE

## 2023-12-14 PROCEDURE — 99214 OFFICE O/P EST MOD 30 MIN: CPT | Mod: PBBFAC,PN | Performed by: INTERNAL MEDICINE

## 2023-12-14 PROCEDURE — 3074F SYST BP LT 130 MM HG: CPT | Mod: CPTII,,, | Performed by: INTERNAL MEDICINE

## 2023-12-14 PROCEDURE — 97163 PT EVAL HIGH COMPLEX 45 MIN: CPT | Mod: PN

## 2023-12-14 RX ORDER — ONDANSETRON 2 MG/ML
8 INJECTION INTRAMUSCULAR; INTRAVENOUS
Status: CANCELLED
Start: 2023-12-29

## 2023-12-14 RX ORDER — SODIUM CHLORIDE 0.9 % (FLUSH) 0.9 %
10 SYRINGE (ML) INJECTION
Status: CANCELLED | OUTPATIENT
Start: 2023-12-15

## 2023-12-14 RX ORDER — DIPHENHYDRAMINE HYDROCHLORIDE 50 MG/ML
50 INJECTION INTRAMUSCULAR; INTRAVENOUS ONCE AS NEEDED
Status: CANCELLED | OUTPATIENT
Start: 2023-12-15

## 2023-12-14 RX ORDER — HEPARIN 100 UNIT/ML
500 SYRINGE INTRAVENOUS
Status: CANCELLED | OUTPATIENT
Start: 2023-12-29

## 2023-12-14 RX ORDER — ONDANSETRON 2 MG/ML
8 INJECTION INTRAMUSCULAR; INTRAVENOUS
Status: CANCELLED
Start: 2023-12-15

## 2023-12-14 RX ORDER — SODIUM CHLORIDE 0.9 % (FLUSH) 0.9 %
10 SYRINGE (ML) INJECTION
Status: CANCELLED | OUTPATIENT
Start: 2023-12-29

## 2023-12-14 RX ORDER — EPINEPHRINE 0.3 MG/.3ML
0.3 INJECTION SUBCUTANEOUS ONCE AS NEEDED
Status: CANCELLED | OUTPATIENT
Start: 2023-12-15

## 2023-12-14 RX ORDER — DIPHENHYDRAMINE HYDROCHLORIDE 50 MG/ML
50 INJECTION INTRAMUSCULAR; INTRAVENOUS ONCE AS NEEDED
Status: CANCELLED | OUTPATIENT
Start: 2023-12-29

## 2023-12-14 RX ORDER — HEPARIN 100 UNIT/ML
500 SYRINGE INTRAVENOUS
Status: CANCELLED | OUTPATIENT
Start: 2023-12-15

## 2023-12-14 RX ORDER — ACETAMINOPHEN 325 MG/1
650 TABLET ORAL
Status: CANCELLED | OUTPATIENT
Start: 2023-12-29

## 2023-12-14 RX ORDER — LEVOTHYROXINE SODIUM 175 UG/1
350 TABLET ORAL
Qty: 60 TABLET | Refills: 11 | Status: SHIPPED | OUTPATIENT
Start: 2023-12-14 | End: 2024-12-13

## 2023-12-14 RX ORDER — OLMESARTAN MEDOXOMIL 20 MG/1
20 TABLET ORAL DAILY
Qty: 30 TABLET | Refills: 5 | Status: SHIPPED | OUTPATIENT
Start: 2023-12-14 | End: 2024-12-13

## 2023-12-14 RX ORDER — ACETAMINOPHEN 325 MG/1
650 TABLET ORAL
Status: CANCELLED | OUTPATIENT
Start: 2023-12-15

## 2023-12-14 RX ORDER — EPINEPHRINE 0.3 MG/.3ML
0.3 INJECTION SUBCUTANEOUS ONCE AS NEEDED
Status: CANCELLED | OUTPATIENT
Start: 2023-12-29

## 2023-12-14 NOTE — PROGRESS NOTES
Gail Mckinnon, MRN [id number]  PID# [id number]     Protocol: SWOG   IRB#: 2023.047  : NGUYEN Degroot MD  Treating Investigator: JESUS Santo MD     Randomized Phase II Trial of Encorafenib and Cetuximab with or Without Nivolumab (NSC #[id number]) for Patients with Previously Treated, Microsatellite Stable BRAF V600E Metastatic and/or Unresectable Colorectal Cancer   Cycle 1 Day 15 Arm 1/ Nivolumab + Cetuximab + Encorafenib therapy.     December 14, 2023--> Cycle 4 Day 28    Cycle 5 Day 1--> December 15th, 2023     The patient presented to the planned office visit alone. The patient was alert, oriented, without noted distress, with appropriate mood and affect for the situation, and freely agreed to continue with participating in the referenced study.  Per Section 7.4- Administration of cetuximab will be dosed every 14 days (+/- 7 days).     [id number] pre-tx labs: CBC w/ diff, CMP, Magnesium resulted and reviewed by the treating MD and this CRC. TSH reported WNL  Endocrinology following TSH for patient's baseline Grade II Hypothyroidism (resulted from treatment for Hyperthyroidism). See communication from study chair regarding TSH monitoring for this patient in Cycle 1 section of shadow chart. Additional source documentation in Cycle 4 Day 1 shadow chart and linked to 16NOV2023 CRC note.     ConMed list: reviewed with the patient for accuracy- see shadow chart.      Patient admits to being compliant with QD dosing of encorafenib and the use of the protocol's pill diary.   Patient reports she is also compliant with adjusted dose of Synthroid (300 mcg) two 150 mcg tablets Daily since 14NOV2023.The patient knows to return remaining encorafenib and protocol pill diary at next appointment.  The patient verbalized understanding to take Encorafenib #4 capsule with water in the morning at the same time each day. Pt. also agreed to taking before leaving home on the days of infusional therapy to provide one hour time  frame before receiving oral pre-medications.  Pt. instructed to notify this CRC if she does not receive communication for next Encorafenib refill from [facility] by second week in January 2024.  Baseline AEs:  See shadow chart for full list of Baseline AEs.   Baseline Hypothyroidism- Grade 2 (Start date 2012-continues) per documentation by Dr. Moss, the hypothyroidism resulted from treatment for hyperthyroidism. 12/13/23 TSH WNL.  Dr. Santo is aware of Synthroid dosing changes (300mcg QD) for Grade 2 BASELINE Hypothyroidism; does not deem related to protocol treatment, nor CS, and no new orders given.   Reviewed Section 8.3.1 Nivolumab dose mods- The Grade II Hypothyroidism present at Baseline, has not increased in grade, and the patient is asymptomatic per the treating MD's PE. The patient denies any persistent headaches or visual changes.   See source doc communications concerning elevated 03NOV2023 TSH with WNL Free T4 with Dr. Arechiga, Study Chair, in Cycle 3 Day 15 section and Cycle 4 Day 1.     Baseline Fatigue- Grade 1- continues- pt reports being able to perform all ADLS. The treating MD is aware, does not deem as CS, no new orders given and continue to monitor.  Baseline Hypercalcemia-Grade 1 continues- corrected calcium calculation attached= 11.14 mg/dL. Endocrinologist monitoring as related to the patients BASELINE Hyperparathyroidism. Per Dr. Moss's office, no treatment or intervention currently. The treating MD is aware does not deem CS, no change in Grade, no new orders, will continue to monitor.   Baseline Hyperparathyroidism- Grade 2 Continues (Start date 2021) The treating MD is aware, does not deem CS, no new orders given. Patient seen by endocrine on22NOV2023 & discussed with the treating MD; current calcium levels were not deemed CS, no new orders, will continue to monitor.      AEs:   See shadow chart for full list of AEs.     The patient specifically denies any nausea,  diarrhea, or abdominal pain.     The patient denies experiencing any infusion related reaction including chills/rigors, nausea, vomiting, or headache during or after Cycle 4 Days 1 or 15.   Per protocol Section 7 / 7.1 & Table 3 The treating MD added hydrocortisone 50mg IV to Acetaminophen and Diphenhydramine premeds and increased infusion time of Cetuximab to four hours followed by a period of observation for previous cycles, beginning with Cycle 1 Day 15. Per the treating MD additional pre-meds and infusion extension will continue for the patient's future infusion appts.      Rash-Maculo-papular Grade 1 Start date 8/28/2023- continues. No worsening of rash noted by the treating MD with today's examination. Pt. confirms continued use of clindamycin gel and doxycycline as prescribed for sporadic areas of pruritic rash. Mild soaps, temped water, and moisturizers recommended. Erbitux skin care kit provided to patient as packaged from [employer]. The treating MD is aware, dermatological exam performed by Dr Santo during today did not reveal any rash on other parts of the patient's body, except the mild facial rash. The treating MD does not deem CS, and no new orders given.     Insomnia- Grade 2 (Start date 9/12/2023-continues) The treating MD deems as unlikely d/t to protocol therapy, and does not deem as CS, no new orders given. The patient denies taking any rx or OTC medication for sleep. The patient was reminded to avoid Trazodone while on Encorafenib. Endocrinologist is planning for the patient undergo a sleep study to evaluate the patient's snoring. (30OCT2023 Dr. Moss note).      Vitamin D, 25 Hydroxy ordered per endocrinologist; reported less than normal limits-endocrine following; No CTCAE grading found. The treating MD does not relate to therapy, does not deem as CS, no new orders given. Ergocalciferol prescribed and patient admits to starting medication on 31OCT2023.    Cystitis noninfective- Grade 1 (Start  12/?/2023) Pt reports polyuria and pain on urination. Pt also reports urine has an odor. The treating MD is aware, urinalysis ordered and resulted. The treating MD does not deem as CS, related to therapy, no additional orders given, will continue to monitor with possible referral to urology.    [id number] Fasting glucose reported WNL.     /69 Pulse 100 Temp 97.6 °F (Temporal) Resp 20  Wt. 128.7 kg (283 lb 11.7oz) SpO2 94%   BSA 2.45 m² Pain Sc 0-No pain  Zubrod PS: One     [id number] CT scan: Reviewed by treating MD and Recist calculation of target lesions determined stable disease. However, the treating MD concerned for disease progression related to the [id number] report of new scattered soft tissue nodules along the left pericolic gutter, with the largest measuring 1.2 x 0.7 cm (series 102, image 170), which may reflect enlarged lymph nodes or possible tumoral deposits.  Per protocol section 10.2.2 and tumor board recommendations, the treating MD ordered a PET scan to complement the CT finding of new scattered soft tissue nodules along the left pericolic gutter. See addended report of [id number] PET Scan.    After the review of the following protocol sections and today's examination of the patient, the treating MD determined the patient as clinically benefiting from treatment with pseudo progression of disease, and determined that the patient should proceed with Cycle 5 Day 1 of treatment with a repeat CT scan at 4 weeks/prior to cycle 6 per Section 10.8.    Protocol sections reviewed:   7.7 The treating MD deems that the patient is clinically benefitting from treatment.   7.9 Treatment beyond progression (Arm 1 only) A minority of participants treated with immunotherapy (nivolumab) may derive clinical benefit, such as in delayed responses, stable disease, or increased overall survival, despite initial evidence of tumor enlargement. These participants may be permitted to continue treatment beyond initial  RECIST 1.1- defined PD that occurs during the initial 16 weeks of treatment, (the 04DEC2023 scan falls within the initial 16 weeks of treatment) if they continue to clinically benefit in the opinion of the treating investigator and are not exposed to unreasonable risk.   Participants must have the following:    Absence of symptoms and signs indicating clinically significant progressive disease.     No decline in Zubrod performance status.    Absence of rapid disease progression or threat to vital organs or critical anatomical sites [e.g., CNS metastasis, respiratory failure due to tumor compression, spinal cord compression] requiring urgent alternative medical intervention.  10.8 irRC Progression (Arm 1 only) irRC progression is defined by progression as outlined in Section 10.2d except that progression determined by appearance of new lesions or by a 20% increase in the sum of diameters must be confirmed by a second consecutive determination of progression at least 28 days from the date of initial documentation of progression.    Dr. Santo assessed all labs, VS & any PE findings as either WNL or NCS. At the discretion of the treating MD, the patient's current cardiac function does not require evaluation by ECG.   The treating MD deemed the patient eligible to receive C 5 D 1 of protocol treatment tomorrow on 15DEC2023.     Treatment Dosing for C 5 D 1:  For the patient's safety, the treating MD plans to continue following the protocol's recommendations of adding Hydrocortisone to other premeds and extending the infusion time of the Cetuximab to prevent delayed infusion reaction.  Infusion time extended per protocol Cetuximab-Related Infusion Reactions (Table 3).  Hydrocortisone 50mg IV added to premedication of Acetaminophen and Diphenhydramine. Protocol Section 7.0 / 7.1     Time out for dosing of protocol treatment completed with the treating MD in the exam room.  15DEC2023 treatment plan orders reviewed and  signed by Dr Santo.     Since pt's wt. has not changed by 10% from original dosing, no dosing changes required.  Baseline BSA 2.46 m2 based on Screening: Ht. 5'6 and 287.26 lbs. (130.3 kg)      Cetuximab 500 mg/m2 x 2.46 m2 BSA = 1230 mg over 4 hours- Rounded dose via pharmacy policy equals 1200 mg dose.  Nivolumab Flat dose of 480mg  Encorafenib 75mg tabs: 300mg po days 1-28.      This CRC reviewed upcoming appointments with the patient and encouraged to call with any new symptoms or issues, the patient verbalized understanding. Patient made aware to call after hour number, that she agrees to having, if after business hours or over the weekend. The patient is aware to return in the morning to receive cycle 5 Day 1 protocol treatment. The patient denied having any additional questions and was discharged without noted distress.     Next protocol required bio-specimen (Whole Blood) due at progression of disease. (Section 9.0 Study Calendar)    Per protocol section 10.8 and MD order; next CT scheduled on Thursday, January 2024     Next Every 8-week CT C/A/P (Section 7.5): Due January 29th, 2024- Not scheduled.     Cycle 5 Day 15:    [id number] Labs @ 13:30  [id number] with Dr. Villanueva @ 2:00  [id number] C5 Day 15 @ 0830  See source doc from Study chair dated 8/31/2023 (in patient shadow chart) allowing 3-day window of toxicity assessments.         CHANDRIKA Adams RN

## 2023-12-14 NOTE — TELEPHONE ENCOUNTER
Pt advised and verb understanding.  She has infusion scheduled for 1/12/24 and will ask them to draw TSH at that time.

## 2023-12-14 NOTE — PATIENT INSTRUCTIONS
Current research recommends 150 minutes of moderate aerobic exercise of 4/10 or more for 150 minutes a week coupled with at least 2 days a week of resistive exercise  for all major muscle grounds 12-15 reps each.         Abdominal Breathing Exercises      Get comfortable and relax your neck and shoulders. You can sit or lie down. Place one hand on your upper chest and place the other hand on your belly button. Use your hands to feel the movements as you breathe in and out.  Take a deep breath in through your nose and feel the hand on your stomach move out. Do not let your shoulders move up. The hand on your chest should not move.   Breathe out slow and gentle through your mouth. Pucker your lips as if you were going to whistle or blow out a candle. The hand on your stomach should move in as you breathe out. Breathe out as long as you can until all the air is gone.   To help keep the lymphatic system moving well, practice two breaths every hour using the steps for abdominal breathing exercises.    Transition this into sitting when you can.

## 2023-12-14 NOTE — PROGRESS NOTES
PROGRESS NOTE    Subjective:       Patient ID: Gail Mckinnon is a 53 y.o. female.  MRN: [id number]  : 1970    Chief Complaint: Metastatic colon cancer     History of Present Illness:   Gail Mckinnon is a 53 y.o. female who presents with colon cancer, initially stage III and now with LN recurrence.    On FOLIRI+ Avastin sine .     She was briefly switched to xeloda due to 5 FU shortage for a month. Did not tolerate Xeloda well due hand foot syndrome, blistering of feet, did not take the last day of Xeloda. Completed .     Resumed Folfiri + Avastin on 23. Atropine was held due to prior constipation.     Was admitted to the hospital from -23 for intractable nausea , vomiting and diarrhea as well as abdominal pain. No hematochezia or dark stool was noted.      US showed gallbladder stone and dilated CBD. She was managed conservatively. Had CT abd, pelvis, colonoscopy and MRCP that were relatively unremarkable without signs of cholecystitis. Cholecystectomy was not recommended.     She had recently resumed FOLFIRI and the symptoms started after the first cycle of resuming, never had issues prior to this.  Stayed in the hospital for 10 days.  C diff was negative. No other etiology was established. Symptoms improved over time and she was discharged on .     Interim history:  Enrolled in  SWOG 2107 (encorafenib/cetuximab and randomized to the Nivolumab arm), here to obtain for cycle 5 day 1.    Staging scans are done as per research protocol first completed on 10/9, consistent with response to treatment. Most recent repeat scans .     23  Has had polyuria and pain on urination. Urine has an odor as well and she feels some pelvic discomfort.   Has stress related to recent scans and work up.   Rash is relatively stable, on doxycycline.   Stable fatigue, no diarrhea.     We discussed her follow up scans, images  reviewed at Tumor board, PET scan was recommended and completed. Reviewed results and further recommendations.     Oncology History:  She completed adjuvant FOLFOX in November 2020. She tolerated only 4 cycles of FOLFOX before she developed an infusion reaction to oxaliplatin in cycle 5 was well as cycle 6. She then completed 6 cycles of infusional 5 FU.     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 She was offered second line therapy with FOLFIRI and Avastin.      She presented to the ED on 11/26 with left flank pain. CT renal stone study showed Moderate hydronephrosis on the left secondary to 3 mm calculus at the UPJ.    She had a PET scan mid April that showed possible progression of her disease with      She has been to [facility] for another opinion. No change was recommended in systemic therapy but she was offered surgical removal of the aorta caval nodes.  Recent sans at [facility]  show stable disease.      Surgery done 7/12/22. Received 2 more cycle of FOLFIRI without Avastin.     She had repeat imaging at [facility] on 9/24/22 that unfortunately showed disease progression since her surgery with multiple RP nodes and one mediastinal node.       PET 12/8/22  Impression:     1. Progression of disease with interval increase of abdominal and pelvic lymphadenopathy.  2. New area of moderate FDG uptake at the right half of the T9 vertebral body (image 124).  Favor degenerative disc changes.  No underlying osteolytic or osteoblastic lesion.  Recommend continued attention on follow-up.  3. No other evidence of FDG avid disease.     12/22/22  Impression After scan comparison:     1. Metastatic portacaval and left periaortic retroperitoneal lymph nodes which remain stable between the CT of 09/24/2022 and the subsequent PET-CT of 12/08/2022.  There is no evidence of progression of metastatic disease.  2. Nonobstructing bilateral renal calculi and cholelithiasis.    2/10/22:  CT chest abd pelvis  Impression:      Stable periportal, retroperitoneal and mesenteric lymphadenopathy compared to PET-CT 12/08/2022.     Stable right middle lobe 3 mm pulmonary nodule.  No new or enlarging pulmonary nodule.     Hepatic steatosis.  Hepatosplenomegaly.     Cholelithiasis.     Bilateral nonobstructing nephrolithiasis.     Small hiatal hernia with retained contrast in the distal esophagus.  Correlate for reflux.    She had virtual visit at [facility] on 2/17/23.     She has continued FOLFIRI and Avastin.     CT abd pelvis   5/7/23  Impression:     1. Mesenteric inflammatory changes have worsened since the prior study.  2. Mesenteric, retroperitoneal and left-sided pelvic enlarged lymph nodes are unchanged.     8/11/23  CT chest abd pelvis   Impression:     1. Patient with a history of colon adenocarcinoma with worsening periaortic, retroperitoneal, mesenteric, and iliac chain lymphadenopathy the in the abdomen and pelvis when compared with the previous study suggesting worsening metastatic disease.  2. Moderate left hydronephrosis and proximal left hydroureter to the level of possible retroperitoneal scarring.  Invasion/compression of the left ureter is not excluded.  3. Bilateral nephrolithiasis  4. Hepatomegaly and hepatic steatosis  5. Two tiny < 5 mm pulmonary nodules within the chest, unchanged.  No new pulmonary nodules.    10/9/23  CT chest abd pelvis     Impression:     Decreased size of retroperitoneal and mesenteric lymph nodes.  Stable left external iliac lymph node. Right upper lobe pulmonary nodule appears slightly increased in size.  No new pulmonary nodules.  Findings suggest mixed/partial response to therapy.     Cholelithiasis.     Hepatosplenomegaly.     Small pericardial effusion.     RECIST SUMMARY:     Date of prior examination for comparison: 08/11/2023     Lesion 1: Retroperitoneal soft tissue nodule but in the duodenum.  1.7 cm. Series 3 image 93.  Prior measurement 82.1 cm.     Lesion 2: Celiac axis lymph node.  1.5  cm. Series 3 image 56.  Prior measurement 1.8 cm.     Lesion 3: Portacaval lymph node.  1.0 cm. Series 3 image 59.  Prior measurement 1.2 cm.     Lesion 4: Mesenteric lymph node.  1.2 cm. Series 3 image 105.  Prior measurement 1.6 cm.     12/4/23 CT chest abd pelvis  Impression:     1. Stable and mildly enlarged mediastinal, retroperitoneal, mesenteric, and left pelvic lymph nodes, as well as interval development of new nodular soft tissue densities along the left pericolic gutter worrisome for tumoral deposits.  2. Interval slight increase in size several solid pulmonary nodules.  No new pulmonary nodule.  3. New small area of nodular wall thickening along the posterosuperior aspect of the urinary bladder, nonspecific and possibly related to adjacent fibrosis/scarring, with small tumoral deposit not excluded.  Consider correlation with cystoscopy.  4. Bilateral nonobstructive nephrolithiasis.  Mild left hydronephrosis.  5. Multiple additional findings, as noted above.  RECIST SUMMARY:     Date of prior examination for comparison: 10/09/2023     Lesion 1: Retroperitoneal soft tissue nodule abutting the duodenum.  1.3 cm. Series 102 image 155.  Prior measurement 1.3 cm.     Lesion 2: Celiac axis lymph node.  1.5 cm. Series 102 image 82.  Prior measurement 1.5 cm.     Lesion 3: Portacaval lymph node.  1.2 cm. Series 102 image 92.  Prior measurement 1.2 cm.     Lesion 4: Mesenteric lymph node.  1.6 cm. Series 102 image 177.  Prior measurement 1.2 cm.    12/13/23  PET     Impression:     Progression of disease with multiple new and enlarging lymph nodes throughout the pelvis and mesentery along with new metastatic FDG avid mediastinal adenopathy as above.  Oncology History:  Oncology History   Malignant neoplasm of sigmoid colon   3/16/2020 Initial Diagnosis    Malignant neoplasm of sigmoid colon     3/31/2020 Cancer Staged    Staging form: Colon and Rectum, AJCC 8th Edition  - Clinical stage from 3/31/2020: Stage IIIC  (cT4b, cN2a, cM0)     5/6/2020 - 11/17/2020 Chemotherapy    Treatment Summary   Plan Name: OP FOLFOX 6 Q2W  Treatment Goal: Curative  Status: Inactive  Start Date: 5/6/2020  End Date: 11/6/2020  Provider: Dylan Leyva MD  Chemotherapy: fluorouraciL injection 945 mg, 400 mg/m2 = 945 mg, Intravenous, Clinic/[facility] 1 time, 14 of 14 cycles  Administration: 945 mg (5/6/2020), 945 mg (5/20/2020), 945 mg (6/3/2020), 945 mg (6/17/2020), 945 mg (7/29/2020), 945 mg (8/12/2020), 945 mg (8/26/2020), 945 mg (9/9/2020), 945 mg (9/23/2020), 945 mg (10/6/2020), 945 mg (10/21/2020), 945 mg (11/4/2020)  fluorouraciL 2,400 mg/m2 = 5,665 mg in sodium chloride 0.9% 240 mL chemo infusion, 2,400 mg/m2 = 5,665 mg, Intravenous, Over 46 hours, 14 of 14 cycles  Administration: 5,665 mg (5/6/2020), 5,665 mg (5/20/2020), 5,665 mg (6/3/2020), 5,665 mg (6/17/2020), 5,665 mg (7/29/2020), 5,665 mg (8/12/2020), 5,665 mg (8/26/2020), 5,665 mg (9/9/2020), 5,665 mg (9/23/2020), 5,665 mg (10/6/2020), 5,665 mg (10/21/2020), 5,665 mg (11/4/2020)  leucovorin calcium 900 mg in dextrose 5 % 250 mL infusion, 945 mg, Intravenous, Clinic/[facility] 1 time, 14 of 14 cycles  Administration: 900 mg (5/6/2020), 900 mg (5/20/2020), 900 mg (6/3/2020), 900 mg (6/17/2020), 945 mg (6/30/2020), 945 mg (7/20/2020), 945 mg (7/29/2020), 945 mg (8/12/2020), 945 mg (8/26/2020), 945 mg (9/9/2020), 945 mg (9/23/2020), 945 mg (10/6/2020), 945 mg (10/21/2020), 945 mg (11/4/2020)  oxaliplatin (ELOXATIN) 200 mg in dextrose 5 % 500 mL chemo infusion, 201 mg, Intravenous, Clinic/[facility] 1 time, 6 of 6 cycles  Dose modification: 65 mg/m2 (original dose 85 mg/m2, Cycle 6)  Administration: 200 mg (5/6/2020), 200 mg (5/20/2020), 200 mg (6/3/2020), 200 mg (6/17/2020), 201 mg (6/30/2020), 150 mg (7/20/2020)     5/3/2021 Cancer Staged    Staging form: Colon and Rectum, AJCC 8th Edition  - Clinical stage from 5/3/2021: Stage Unknown (rcT0, cNX, cM1)     6/16/2021 - 8/2/2023 Chemotherapy    Treatment  Summary   Plan Name: OP COLORECTAL FOLFIRI + BEVACIZUMAB Q2W  Treatment Goal: Control  Status: Inactive  Start Date: 6/16/2021  End Date: 8/2/2023  Provider: Marah Santo MD  Chemotherapy: fluorouraciL injection 990 mg, 400 mg/m2 = 990 mg, Intravenous, [facility] 1 time, 15 of 15 cycles  Administration: 990 mg (6/16/2021), 990 mg (6/30/2021), 990 mg (7/14/2021), 980 mg (7/28/2021), 990 mg (8/11/2021), 990 mg (8/25/2021), 990 mg (9/8/2021), 990 mg (9/22/2021), 990 mg (10/6/2021), 990 mg (10/20/2021), 990 mg (11/3/2021), 990 mg (12/1/2021), 990 mg (12/15/2021), 990 mg (11/17/2021), 990 mg (12/28/2021)  fluorouraciL 2,400 mg/m2 = 5,950 mg in sodium chloride 0.9% 240 mL chemo infusion, 2,400 mg/m2 = 5,950 mg, Intravenous, Over 46 hours, 43 of 46 cycles  Administration: 5,950 mg (6/16/2021), 5,950 mg (6/30/2021), 5,950 mg (7/14/2021), 5,880 mg (7/28/2021), 5,930 mg (8/11/2021), 5,930 mg (8/25/2021), 5,930 mg (9/8/2021), 5,930 mg (9/22/2021), 5,930 mg (10/6/2021), 5,930 mg (10/20/2021), 5,930 mg (11/3/2021), 5,930 mg (12/1/2021), 5,930 mg (12/15/2021), 5,930 mg (11/17/2021), 5,930 mg (12/28/2021), 6,050 mg (5/11/2022), 6,025 mg (3/9/2022), 6,025 mg (2/23/2022), 5,930 mg (2/2/2022), 5,930 mg (1/19/2022), 6,050 mg (4/20/2022), 6,025 mg (4/6/2022), 6,025 mg (3/23/2022), 6,050 mg (6/1/2022), 6,050 mg (6/15/2022), 6,050 mg (10/19/2022), 6,050 mg (10/5/2022), 6,050 mg (11/2/2022), 6,050 mg (11/16/2022), 6,050 mg (11/30/2022), 6,050 mg (1/4/2023), 6,050 mg (12/19/2022), 6,050 mg (1/18/2023), 6,000 mg (5/22/2023), 6,000 mg (4/26/2023), 6,000 mg (4/11/2023), 6,000 mg (2/28/2023), 6,050 mg (2/14/2023), 6,000 mg (2/1/2023), 6,000 mg (7/5/2023), 6,000 mg (6/19/2023), 6,000 mg (6/5/2023), 6,000 mg (7/31/2023)  bevacizumab (AVASTIN) 600 mg in sodium chloride 0.9% 100 mL chemo infusion, 660 mg, Intravenous, Clinic/[facility] 1 time, 36 of 36 cycles  Dose modification: 5 mg/kg (original dose 5 mg/kg, Cycle 26, Reason: MD Discretion,  Comment: 5 mg/kg original dose)  Administration: 600 mg (6/30/2021), 600 mg (7/14/2021), 600 mg (7/28/2021), 600 mg (6/16/2021), 600 mg (8/11/2021), 655 mg (8/25/2021), 600 mg (9/8/2021), 600 mg (9/22/2021), 600 mg (10/6/2021), 600 mg (10/20/2021), 600 mg (11/3/2021), 655 mg (12/1/2021), 600 mg (12/15/2021), 600 mg (11/17/2021), 600 mg (12/28/2021), 600 mg (5/11/2022), 600 mg (3/9/2022), 600 mg (2/23/2022), 600 mg (2/2/2022), 600 mg (1/19/2022), 600 mg (4/20/2022), 680 mg (4/6/2022), 600 mg (3/23/2022), 680 mg (10/5/2022), 680 mg (10/19/2022), 680 mg (11/2/2022), 680 mg (11/16/2022), 680 mg (11/30/2022), 680 mg (1/4/2023), 680 mg (12/19/2022), 680 mg (1/18/2023), 680 mg (3/28/2023), 680 mg (3/14/2023), 680 mg (2/28/2023), 680 mg (2/14/2023), 680 mg (2/1/2023)  irinotecan (CAMPTOSAR) 440 mg in sodium chloride 0.9% 500 mL chemo infusion, 446 mg, Intravenous, Clinic/[facility] 1 time, 45 of 48 cycles  Dose modification: 180 mg/m2 (original dose 180 mg/m2, Cycle 24)  Administration: 440 mg (6/16/2021), 440 mg (6/30/2021), 440 mg (7/14/2021), 440 mg (7/28/2021), 440 mg (8/11/2021), 444 mg (8/25/2021), 440 mg (9/8/2021), 440 mg (9/22/2021), 440 mg (10/6/2021), 440 mg (10/20/2021), 440 mg (11/3/2021), 440 mg (12/1/2021), 440 mg (12/15/2021), 440 mg (11/17/2021), 440 mg (12/28/2021), 440 mg (5/11/2022), 440 mg (3/9/2022), 440 mg (2/23/2022), 440 mg (2/2/2022), 440 mg (1/19/2022), 440 mg (4/20/2022), 440 mg (4/6/2022), 440 mg (3/24/2022), 440 mg (6/1/2022), 440 mg (6/15/2022), 440 mg (10/19/2022), 440 mg (10/5/2022), 440 mg (11/2/2022), 440 mg (11/16/2022), 440 mg (11/30/2022), 440 mg (1/4/2023), 440 mg (12/19/2022), 440 mg (1/18/2023), 440 mg (5/22/2023), 440 mg (4/26/2023), 440 mg (4/11/2023), 440 mg (3/28/2023), 440 mg (3/14/2023), 440 mg (2/28/2023), 440 mg (2/14/2023), 440 mg (2/1/2023), 440 mg (7/5/2023), 440 mg (6/19/2023), 440 mg (6/5/2023), 440 mg (7/31/2023)  bevacizumab-awwb (MVASI) 5 mg/kg = 680 mg in sodium chloride  0.9% 100 mL infusion, 5 mg/kg = 680 mg (100 % of original dose 5 mg/kg), Intravenous, [facility] 1 time, 7 of 10 cycles  Dose modification: 5 mg/kg (original dose 5 mg/kg, Cycle 39)  Administration: 680 mg (4/11/2023), 680 mg (4/26/2023), 680 mg (5/22/2023), 680 mg (7/5/2023), 680 mg (6/19/2023), 680 mg (6/5/2023), 680 mg (7/31/2023)     8/17/2023 - 8/18/2023 Chemotherapy    Treatment Summary   Plan Name: OP CETUXIMAB 500MG Q2W  Treatment Goal: Control  Status: Inactive  Start Date:   End Date:   Provider: Marah Santo MD  Chemotherapy: [No matching medication found in this treatment plan]     8/24/2023 -  Chemotherapy    Treatment Summary   Plan Name: [facility] - ARM 1 ENCORAFENIB  CETUXIMAB NIVOLUMAB  Treatment Goal: Palliative  Status: Active  Start Date: 8/24/2023  End Date: 7/12/2024 (Planned)  Provider: Marah Santo MD  Chemotherapy: cetuximab (ERBITUX) 100 mg/50 mL chemo infusion 1,200 mg, 1,230 mg, Intravenous, Clinic/[facility] 1 time, 4 of 12 cycles  Administration: 1,200 mg (8/24/2023), 1,200 mg (9/8/2023), 1,200 mg (9/22/2023), 1,200 mg (10/6/2023), 1,200 mg (10/20/2023), 1,200 mg (11/3/2023), 1,200 mg (11/17/2023), 1,200 mg (12/1/2023)  encorafenib 75 mg Cap, 300 mg, Oral, Daily, 1 of 1 cycle, Start date: --, End date: --     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   4/3/2020 Initial Diagnosis    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     5/6/2020 - 11/17/2020 Chemotherapy    Treatment Summary   Plan Name: OP FOLFOX 6 Q2W  Treatment Goal: Curative  Status: Inactive  Start Date: 5/6/2020  End Date: 11/6/2020  Provider: Dylan Leyva MD  Chemotherapy: fluorouraciL injection 945 mg, 400 mg/m2 = 945 mg, Intravenous, [facility] 1 time, 14 of 14 cycles  Administration: 945 mg (5/6/2020), 945 mg (5/20/2020), 945 mg (6/3/2020), 945 mg (6/17/2020), 945 mg (7/29/2020), 945 mg (8/12/2020), 945 mg (8/26/2020), 945 mg (9/9/2020), 945 mg (9/23/2020), 945 mg (10/6/2020), 945 mg (10/21/2020), 945 mg  (11/4/2020)  fluorouraciL 2,400 mg/m2 = 5,665 mg in sodium chloride 0.9% 240 mL chemo infusion, 2,400 mg/m2 = 5,665 mg, Intravenous, Over 46 hours, 14 of 14 cycles  Administration: 5,665 mg (5/6/2020), 5,665 mg (5/20/2020), 5,665 mg (6/3/2020), 5,665 mg (6/17/2020), 5,665 mg (7/29/2020), 5,665 mg (8/12/2020), 5,665 mg (8/26/2020), 5,665 mg (9/9/2020), 5,665 mg (9/23/2020), 5,665 mg (10/6/2020), 5,665 mg (10/21/2020), 5,665 mg (11/4/2020)  leucovorin calcium 900 mg in dextrose 5 % 250 mL infusion, 945 mg, Intravenous, Clinic/[facility] 1 time, 14 of 14 cycles  Administration: 900 mg (5/6/2020), 900 mg (5/20/2020), 900 mg (6/3/2020), 900 mg (6/17/2020), 945 mg (6/30/2020), 945 mg (7/20/2020), 945 mg (7/29/2020), 945 mg (8/12/2020), 945 mg (8/26/2020), 945 mg (9/9/2020), 945 mg (9/23/2020), 945 mg (10/6/2020), 945 mg (10/21/2020), 945 mg (11/4/2020)  oxaliplatin (ELOXATIN) 200 mg in dextrose 5 % 500 mL chemo infusion, 201 mg, Intravenous, [facility] 1 time, 6 of 6 cycles  Dose modification: 65 mg/m2 (original dose 85 mg/m2, Cycle 6)  Administration: 200 mg (5/6/2020), 200 mg (5/20/2020), 200 mg (6/3/2020), 200 mg (6/17/2020), 201 mg (6/30/2020), 150 mg (7/20/2020)     6/16/2021 - 8/2/2023 Chemotherapy    Treatment Summary   Plan Name: OP COLORECTAL FOLFIRI + BEVACIZUMAB Q2W  Treatment Goal: Control  Status: Inactive  Start Date: 6/16/2021  End Date: 8/2/2023  Provider: Marah Santo MD  Chemotherapy: fluorouraciL injection 990 mg, 400 mg/m2 = 990 mg, Intravenous, Clinic/[facility] 1 time, 15 of 15 cycles  Administration: 990 mg (6/16/2021), 990 mg (6/30/2021), 990 mg (7/14/2021), 980 mg (7/28/2021), 990 mg (8/11/2021), 990 mg (8/25/2021), 990 mg (9/8/2021), 990 mg (9/22/2021), 990 mg (10/6/2021), 990 mg (10/20/2021), 990 mg (11/3/2021), 990 mg (12/1/2021), 990 mg (12/15/2021), 990 mg (11/17/2021), 990 mg (12/28/2021)  fluorouraciL 2,400 mg/m2 = 5,950 mg in sodium chloride 0.9% 240 mL chemo infusion, 2,400 mg/m2 = 5,950 mg,  Intravenous, Over 46 hours, 43 of 46 cycles  Administration: 5,950 mg (6/16/2021), 5,950 mg (6/30/2021), 5,950 mg (7/14/2021), 5,880 mg (7/28/2021), 5,930 mg (8/11/2021), 5,930 mg (8/25/2021), 5,930 mg (9/8/2021), 5,930 mg (9/22/2021), 5,930 mg (10/6/2021), 5,930 mg (10/20/2021), 5,930 mg (11/3/2021), 5,930 mg (12/1/2021), 5,930 mg (12/15/2021), 5,930 mg (11/17/2021), 5,930 mg (12/28/2021), 6,050 mg (5/11/2022), 6,025 mg (3/9/2022), 6,025 mg (2/23/2022), 5,930 mg (2/2/2022), 5,930 mg (1/19/2022), 6,050 mg (4/20/2022), 6,025 mg (4/6/2022), 6,025 mg (3/23/2022), 6,050 mg (6/1/2022), 6,050 mg (6/15/2022), 6,050 mg (10/19/2022), 6,050 mg (10/5/2022), 6,050 mg (11/2/2022), 6,050 mg (11/16/2022), 6,050 mg (11/30/2022), 6,050 mg (1/4/2023), 6,050 mg (12/19/2022), 6,050 mg (1/18/2023), 6,000 mg (5/22/2023), 6,000 mg (4/26/2023), 6,000 mg (4/11/2023), 6,000 mg (2/28/2023), 6,050 mg (2/14/2023), 6,000 mg (2/1/2023), 6,000 mg (7/5/2023), 6,000 mg (6/19/2023), 6,000 mg (6/5/2023), 6,000 mg (7/31/2023)  bevacizumab (AVASTIN) 600 mg in sodium chloride 0.9% 100 mL chemo infusion, 660 mg, Intravenous, [facility] 1 time, 36 of 36 cycles  Dose modification: 5 mg/kg (original dose 5 mg/kg, Cycle 26, Reason: MD Discretion, Comment: 5 mg/kg original dose)  Administration: 600 mg (6/30/2021), 600 mg (7/14/2021), 600 mg (7/28/2021), 600 mg (6/16/2021), 600 mg (8/11/2021), 655 mg (8/25/2021), 600 mg (9/8/2021), 600 mg (9/22/2021), 600 mg (10/6/2021), 600 mg (10/20/2021), 600 mg (11/3/2021), 655 mg (12/1/2021), 600 mg (12/15/2021), 600 mg (11/17/2021), 600 mg (12/28/2021), 600 mg (5/11/2022), 600 mg (3/9/2022), 600 mg (2/23/2022), 600 mg (2/2/2022), 600 mg (1/19/2022), 600 mg (4/20/2022), 680 mg (4/6/2022), 600 mg (3/23/2022), 680 mg (10/5/2022), 680 mg (10/19/2022), 680 mg (11/2/2022), 680 mg (11/16/2022), 680 mg (11/30/2022), 680 mg (1/4/2023), 680 mg (12/19/2022), 680 mg (1/18/2023), 680 mg (3/28/2023), 680 mg (3/14/2023), 680 mg  (2/28/2023), 680 mg (2/14/2023), 680 mg (2/1/2023)  irinotecan (CAMPTOSAR) 440 mg in sodium chloride 0.9% 500 mL chemo infusion, 446 mg, Intravenous, [facility] 1 time, 45 of 48 cycles  Dose modification: 180 mg/m2 (original dose 180 mg/m2, Cycle 24)  Administration: 440 mg (6/16/2021), 440 mg (6/30/2021), 440 mg (7/14/2021), 440 mg (7/28/2021), 440 mg (8/11/2021), 444 mg (8/25/2021), 440 mg (9/8/2021), 440 mg (9/22/2021), 440 mg (10/6/2021), 440 mg (10/20/2021), 440 mg (11/3/2021), 440 mg (12/1/2021), 440 mg (12/15/2021), 440 mg (11/17/2021), 440 mg (12/28/2021), 440 mg (5/11/2022), 440 mg (3/9/2022), 440 mg (2/23/2022), 440 mg (2/2/2022), 440 mg (1/19/2022), 440 mg (4/20/2022), 440 mg (4/6/2022), 440 mg (3/24/2022), 440 mg (6/1/2022), 440 mg (6/15/2022), 440 mg (10/19/2022), 440 mg (10/5/2022), 440 mg (11/2/2022), 440 mg (11/16/2022), 440 mg (11/30/2022), 440 mg (1/4/2023), 440 mg (12/19/2022), 440 mg (1/18/2023), 440 mg (5/22/2023), 440 mg (4/26/2023), 440 mg (4/11/2023), 440 mg (3/28/2023), 440 mg (3/14/2023), 440 mg (2/28/2023), 440 mg (2/14/2023), 440 mg (2/1/2023), 440 mg (7/5/2023), 440 mg (6/19/2023), 440 mg (6/5/2023), 440 mg (7/31/2023)  bevacizumab-awwb (MVASI) 5 mg/kg = 680 mg in sodium chloride 0.9% 100 mL infusion, 5 mg/kg = 680 mg (100 % of original dose 5 mg/kg), Intravenous, Clinic/[facility] 1 time, 7 of 10 cycles  Dose modification: 5 mg/kg (original dose 5 mg/kg, Cycle 39)  Administration: 680 mg (4/11/2023), 680 mg (4/26/2023), 680 mg (5/22/2023), 680 mg (7/5/2023), 680 mg (6/19/2023), 680 mg (6/5/2023), 680 mg (7/31/2023)     8/17/2023 - 8/18/2023 Chemotherapy    Treatment Summary   Plan Name: OP CETUXIMAB 500MG Q2W  Treatment Goal: Control  Status: Inactive  Start Date:   End Date:   Provider: Marah Santo MD  Chemotherapy: [No matching medication found in this treatment plan]     8/24/2023 -  Chemotherapy    Treatment Summary   Plan Name: [facility] - ARM 1 ENCORAFENIB  CETUXIMAB  NIVOLUMAB  Treatment Goal: Palliative  Status: Active  Start Date: 2023  End Date: 2024 (Planned)  Provider: Marah Santo MD  Chemotherapy: cetuximab (ERBITUX) 100 mg/50 mL chemo infusion 1,200 mg, 1,230 mg, Intravenous, Clinic/[facility] 1 time, 4 of 12 cycles  Administration: 1,200 mg (2023), 1,200 mg (2023), 1,200 mg (2023), 1,200 mg (10/6/2023), 1,200 mg (10/20/2023), 1,200 mg (11/3/2023), 1,200 mg (2023), 1,200 mg (2023)  encorafenib 75 mg Cap, 300 mg, Oral, Daily, 1 of 1 cycle, Start date: --, End date: --         History:  Past Medical History:   Diagnosis Date    Anxiety     Depression     FH: ovarian cancer 3/16/2020    Hx of psychiatric care     Effexor, Paxil, Lexapro, Zoloft, Wellbutrin, Trazodone Buspar    Hyperthyroidism     Hypothyroid     Kidney calculi     Malignant neoplasm of sigmoid colon 3/16/2020    Menorrhagia     Multinodular goiter 2012    Palpitation     Psychiatric problem     Venous insufficiency       Past Surgical History:   Procedure Laterality Date     SECTION, CLASSIC      x3    COLONOSCOPY N/A 2020    Procedure: COLONOSCOPY;  Surgeon: Shane Parker MD;  Location: [facility];  Service: Endoscopy;  Laterality: N/A;    COLONOSCOPY N/A 2022    Procedure: COLONOSCOPY;  Surgeon: Shane Parker MD;  Location: [facility];  Service: Endoscopy;  Laterality: N/A;    COLONOSCOPY N/A 2023    Procedure: COLONOSCOPY;  Surgeon: Shane Parker MD;  Location: [facility];  Service: Endoscopy;  Laterality: N/A;  no cecum anastomosis    CYSTOSCOPY W/ URETERAL STENT PLACEMENT Bilateral 3/25/2020    Procedure: CYSTOSCOPY, WITH URETERAL STENT INSERTION;  Surgeon: Claudio Tyson MD;  Location: [address];  Service: Urology;  Laterality: Bilateral;    INSERTION OF TUNNELED CENTRAL VENOUS CATHETER (CVC) WITH SUBCUTANEOUS PORT N/A 2020    Procedure: [id number];  Surgeon: Stephen Diagnostic Provider;  Location: [facility] OR [facility]  FLR;  Service: Radiology;  Laterality: N/A;  Room 189/Cindy    LAPAROSCOPIC SIGMOIDECTOMY N/A 3/25/2020    Procedure: COLECTOMY, SIGMOID, LAPAROSCOPIC, flex sig, ERAS high;  Surgeon: Silvio Man MD;  Location: [facility] OR 2ND FLR;  Service: Colon and Rectal;  Laterality: N/A;    MOBILIZATION OF SPLENIC FLEXURE  3/25/2020    Procedure: MOBILIZATION, SPLENIC FLEXURE;  Surgeon: Silvio Man MD;  Location: [facility] OR 2ND FLR;  Service: Colon and Rectal;;    tonsillectomy      TOTAL ABDOMINAL HYSTERECTOMY W/ BILATERAL SALPINGOOPHORECTOMY N/A 3/25/2020    Procedure: HYSTERECTOMY, TOTAL, ABDOMINAL, WITH BILATERAL SALPINGO-OOPHORECTOMY;  Surgeon: Keron Brady MD;  Location: [facility] OR 2ND FLR;  Service: Oncology;  Laterality: N/A;     Family History   Problem Relation Age of Onset    Heart disease Father     Diabetes Mother 65    Drug abuse Brother     Drug abuse Brother     Cancer Maternal Aunt         lung cancer    Cancer Maternal Grandmother         stomach cancer- started in ovaries    Ovarian cancer Maternal Grandmother         stomach cancer- started in ovaries    Colon cancer Paternal Uncle     Cancer Paternal Uncle     Ovarian cancer Maternal Aunt     Ovarian cancer Maternal Aunt     Ovarian cancer Cousin         mother had ovarian cancer    Drug abuse Son     Drug abuse Son         clean/sober since 2012    Colon cancer Son     No Known Problems Daughter     Uterine cancer Neg Hx     Breast cancer Neg Hx      Social History     Tobacco Use    Smoking status: Never    Smokeless tobacco: Never   Substance and Sexual Activity    Alcohol use: Yes     Comment: occasionally- twice monthly    Drug use: Never    Sexual activity: Yes     Partners: Male     Birth control/protection: See Surgical Hx        ROS:   Review of Systems   Constitutional:  Positive for malaise/fatigue. Negative for fever and weight loss.   HENT:  Negative for congestion, hearing loss, nosebleeds and sore throat.    Eyes:  Negative  "for double vision and photophobia.   Respiratory:  Negative for cough, hemoptysis, sputum production, shortness of breath and wheezing.    Cardiovascular:  Negative for chest pain, palpitations, orthopnea and leg swelling.   Gastrointestinal:  Negative for abdominal pain, blood in stool, constipation, diarrhea, heartburn and vomiting.   Genitourinary:  Positive for frequency and urgency. Negative for dysuria and hematuria.   Musculoskeletal:  Negative for back pain, joint pain and myalgias.   Skin:  Positive for rash. Negative for itching.   Neurological:  Negative for dizziness, tingling, seizures, weakness and headaches.   Endo/Heme/Allergies:  Negative for polydipsia. Does not bruise/bleed easily.   Psychiatric/Behavioral:  Negative for depression and memory loss. The patient is nervous/anxious and has insomnia (improving).         Objective:     Vitals:    12/14/23 0915   BP: 116/69   Pulse: 100   Resp: 20   Temp: 97.6 °F (36.4 °C)   TempSrc: Temporal   SpO2: (!) 94%   Weight: 128.7 kg (283 lb 11.7 oz)   Height: 5' 6" (1.676 m)   PainSc: 0-No pain         Wt Readings from Last 10 Encounters:   12/14/23 128.7 kg (283 lb 11.7 oz)   12/01/23 129.9 kg (286 lb 6 oz)   11/30/23 129.9 kg (286 lb 6 oz)   11/22/23 129.6 kg (285 lb 11.5 oz)   11/17/23 129.9 kg (286 lb 6 oz)   11/16/23 129.9 kg (286 lb 6 oz)   11/03/23 130 kg (286 lb 9.6 oz)   11/02/23 130 kg (286 lb 9.6 oz)   10/30/23 128 kg (282 lb 3 oz)   10/20/23 128 kg (282 lb 3 oz)       Physical Examination:   Physical Exam  Vitals and nursing note reviewed.   Constitutional:       General: She is not in acute distress.     Appearance: She is not diaphoretic.   HENT:      Head: Normocephalic.      Mouth/Throat:      Pharynx: No oropharyngeal exudate.   Eyes:      General: No scleral icterus.     Conjunctiva/sclera: Conjunctivae normal.   Neck:      Thyroid: No thyromegaly.   Cardiovascular:      Rate and Rhythm: Normal rate and regular rhythm.      Heart sounds: " Normal heart sounds. No murmur heard.  Pulmonary:      Effort: Pulmonary effort is normal. No respiratory distress.      Breath sounds: No stridor. No wheezing or rales.   Chest:      Chest wall: No tenderness.   Abdominal:      General: Bowel sounds are normal. There is no distension.      Palpations: Abdomen is soft. There is no mass.      Tenderness: There is no abdominal tenderness. There is no rebound.   Musculoskeletal:         General: No tenderness or deformity. Normal range of motion.      Cervical back: Neck supple.   Lymphadenopathy:      Cervical: No cervical adenopathy.   Skin:     General: Skin is warm and dry.      Findings: Rash (grade 1 facial rash, at baseline) present. No erythema.   Neurological:      Mental Status: She is alert and oriented to person, place, and time.      Cranial Nerves: No cranial nerve deficit.      Coordination: Coordination normal.      Gait: Gait is intact.   Psychiatric:         Mood and Affect: Affect normal.         Cognition and Memory: Memory normal.         Judgment: Judgment normal.          Diagnostic Tests:  Significant Imaging: I have reviewed and interpreted all pertinent imaging results/findings.  Laboratory Data:  All pertinent labs have been reviewed.    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 Oxaliplatin was stopped due to severe adverse reaction.     Follow-up CTs and PET in May 2021 showed enlarging retroperitoneal node, concerning for metastatic disease.      She started FOLFIRI + Avastin and has continued till July 2022.   Given isolated RP toni disease, she has undergone open Left periaortic and aortocaval lymph node dissection on 7/12/2022 at [facility]. Resumed FOLFIRI+ Debo with relatively stable disease but now has disease progression on scans in August 2023.     She has been enrolled into  SWOG 2107 (encorafenib/cetuximab + nivo) , cleared for cycle 5 D1. On additional pre medications given infusion reaction to Cetuximab, continue to monitor closely.     Clinically stable, CT scans and PET scans are reviewed and protocol reviewed. As per Recist calculations, the target lesions are stable. However, there is asymptomatic possible POD in the left paracolic gutter, seen on PET scan as well. Based on treatment protocol, these changes noted within 12 weeks of immune therapy could be pseudo progression. Will repeat scans at 4 weeks prior to cycle 6 and monitor closely.     Grade 1 dermatitis   Noted, continue topical hydrocortisone and clindamycin gel, remains on oral doxycycline.     Cystitis non infective   Grade 1.   Check U/A, treat if needed.     Neoplasm pain  Improving. Follow up with palliative care.     Hydronephrosis   Follow up with Urology for co management. No acute intervention was recommended.     Nausea   Continue compazine as needed, improving.     Constipation  Start sennakot and daily Miralax.     Transaminitis  Fluctuates.  Continue to monitor. No obvious liver mets.     Hypercalcemia   Mild, secondary to hyperparathyroidism.  Avoid calcium supplements. Continue Endocrine follow up.     Hypothyroidism  Multinodular goiter   Continue Levothyroxine. Recent dose adjustment noted.   Had a non diagnostic biopsy in 2012, usg findings have remained stable. Reviewed repeat thyroid  US, stable, will follow up with Endocrine.     Essential HTN   Continue antihypertensives.      Anxiety   Continue to  follow up with Onc psych.    Vaginal candidosis   Responded to fluconazole, monitor.    MDM includes  :    - Acute or chronic illness or injury that poses a threat to life or bodily function  - Independent review and explanation of 3+ results from unique tests  - Discussion of management and ordering 3+ unique tests  - Extensive discussion of treatment and management  - Prescription drug management  - Drug therapy requiring intensive monitoring for toxicity          ECOG SCORE               Discussion:   No follow-ups on file.    Plan was discussed with the patient at length, and she verbalized understanding. Gail was given an opportunity to ask questions that were answered to her satisfaction, and she was advised to call in the interval if any problems or questions arise.    Electronically signed by Marah Santo MD        Route Chart for Scheduling    Med Onc Chart Routing      Follow up with physician . Scheduled   Follow up with [facility]    Infusion scheduling note    Injection scheduling note    Labs    Imaging    Pharmacy appointment    Other referrals                  Treatment Plan Information   [facility] - ARM 1 ENCORAFENIB  CETUXIMAB NIVOLUMAB   Marah Santo MD   Upcoming Treatment Dates - [facility] - ARM 1 ENCORAFENIB  CETUXIMAB NIVOLUMAB    12/15/2023       Pre-Medications       ondansetron injection 8 mg       hydrocortisone sodium succinate injection 50 mg       diphenhydrAMINE (BENADRYL) 50 mg in NS 50 mL IVPB       acetaminophen tablet 650 mg       Chemotherapy       cetuximab (ERBITUX) 100 mg/50 mL chemo infusion 1,230 mg       INV nivolumab 480 mg in INV sodium chloride 0.9 % 100 mL chemo infusion  12/29/2023       Pre-Medications       ondansetron injection 8 mg       hydrocortisone sodium succinate injection 50 mg       diphenhydrAMINE (BENADRYL) 50 mg in NS 50 mL IVPB        acetaminophen tablet 650 mg       Chemotherapy       cetuximab (ERBITUX) 100 mg/50 mL chemo infusion 1,230 mg  1/12/2024       Pre-Medications       ondansetron injection 8 mg       hydrocortisone sodium succinate injection 50 mg       diphenhydrAMINE (BENADRYL) 50 mg in NS 50 mL IVPB       acetaminophen tablet 650 mg       Chemotherapy       cetuximab (ERBITUX) 100 mg/50 mL chemo infusion 1,230 mg       INV nivolumab 480 mg in INV sodium chloride 0.9 % 100 mL chemo infusion  1/26/2024       Pre-Medications       ondansetron injection 8 mg       hydrocortisone sodium succinate injection 50 mg       diphenhydrAMINE (BENADRYL) 50 mg in NS 50 mL IVPB       acetaminophen tablet 650 mg       Chemotherapy       cetuximab (ERBITUX) 100 mg/50 mL chemo infusion 1,230 mg    Supportive Plan Information  IV FLUIDS AND ELECTROLYTES   Brayden Solo MD   Upcoming Treatment Dates - IV FLUIDS AND ELECTROLYTES    No upcoming days in selected categories.    Therapy Plan Information  PORT FLUSH  Flushes  heparin, porcine (PF) 100 unit/mL injection flush 500 Units  500 Units, Intravenous, Every visit  sodium chloride 0.9% flush 10 mL  10 mL, Intravenous, Every visit          Answers submitted by the patient for this visit:  Review of Systems Questionnaire (Submitted on 12/10/2023)  appetite change : No  unexpected weight change: No  mouth sores: No  visual disturbance: No  adenopathy: No

## 2023-12-14 NOTE — PROGRESS NOTES
[facility]  Physical Therapy Initial Evaluation  Name: Gail Mckinnon  Clinic Number: [id number]    Therapy Diagnosis:   Encounter Diagnoses   Name Primary?    Secondary malignant neoplasm of retroperitoneum     Chronic midline low back pain without sciatica     Chemotherapy-induced fatigue         Physician: Denita Salazar FNP-C    Physician Orders: PT Eval and Treat   Medical Diagnosis from Referral:   1. Chronic midline low back pain without sciatica    2. Secondary malignant neoplasm of retroperitoneum    3. Chemotherapy-induced fatigue      Evaluation Date: 12/14/2023  Authorization Period Expiration:   Plan of Care Expiration: 10 visits/ 30 days  Progress Note Due: 1/14/2023  Visit # / Visits authorized: 1/ 12 pending      Precautions: Standard, Immunosuppression, and cancer     Time In: 8 AM  Time Out: 9 AM  Total Appointment Time (timed & untimed codes): 60 minutes  Completed FACT G survey and 6MWT today  Subjective   Date of onset: fatigue set in once she was on chemotherapy  History of current condition - Gail reports: colon cancer, initially stage III and now with LN recurrence.    On FOLIRI+ Avastin sine 5/21.    She was briefly switched to xeloda due to 5 FU shortage for a month. Did not tolerate Xeloda well due hand foot syndrome, blistering of feet, did not take the last day of Xeloda. Completed 4/1.    Resumed Folfiri + Avastin on 4/26/23.   Was admitted to the hospital from 5/2-5/12/23 for intractable nausea , vomiting and diarrhea as well as abdominal pain. No hematochezia or dark stool was noted.    US showed gallbladder stone and dilated CBD. She was managed conservatively. Had CT abd, pelvis, colonoscopy and MRCP that were relatively unremarkable without signs of cholecystitis. Cholecystectomy was not recommended.    She had recently resumed FOLFIRI and the symptoms started after the first cycle of resuming, never had issues prior to this.  Stayed in the  hospital for 10 days.  C diff was negative. No other etiology was established. Symptoms improved over time and she was discharged on 5/12.      Interim history:  Enrolled in  SWOG 2107 (encorafenib/cetuximab and randomized to the Nivolumab arm)  We reviewed re staging done as per research protocol on 10/9, consistent with response to treatment. 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 Progression of disease with interval increase of abdominal and pelvic lymphadenopathy.  2. New area of moderate FDG uptake at the right half of the T9 vertebral body (image 124).  Favor degenerative disc changes.  No underlying osteolytic or osteoblastic lesion.  Recommend continued attention on follow-up.  3. No other evidence of FDG avid disease.     12/22/22  Impression After scan comparison:     1. Metastatic portacaval and left periaortic retroperitoneal lymph nodes which remain stable between the CT of 09/24/2022 and the subsequent PET-CT of 12/08/2022.  There is no evidence of progression of metastatic disease.  2. Nonobstructing bilateral renal calculi and cholelithiasis.     2/10/22:  CT chest abd pelvis  Impression:     Stable periportal, retroperitoneal and mesenteric lymphadenopathy compared to PET-CT 12/08/2022.  Stable right middle lobe 3 mm pulmonary nodule.  No new or enlarging pulmonary nodule   Hepatic steatosis.  Hepatosplenomegaly.  Cholelithiasis.  Bilateral nonobstructing nephrolithiasis.  Small hiatal hernia with retained contrast in the distal  esophagus.     Chemotherapy: on a trial of 4 pills a day, 2 weeks infusion, every four weeks infusion plus immunotherapy has more difficulty the next day. Tired and exhausted pretty much all the time.  Radiation: none     Medical History:   Past Medical History:   Diagnosis Date    Anxiety     Depression     FH: ovarian cancer 3/16/2020    Hx of psychiatric care     Effexor, Paxil, Lexapro, Zoloft, Wellbutrin, Trazodone Buspar    Hyperthyroidism     Hypothyroid     Kidney calculi     Malignant neoplasm of sigmoid colon 3/16/2020    Menorrhagia     Multinodular goiter 2012    Palpitation     Psychiatric problem     Venous insufficiency        Surgical History:   Gail Mckinnon  has a past surgical history that includes tonsillectomy; Colonoscopy (N/A, 2020);  section, classic; Laparoscopic sigmoidectomy (N/A, 3/25/2020); Mobilization of splenic flexure (3/25/2020); Total abdominal hysterectomy w/ bilateral salpingoophorectomy (N/A, 3/25/2020); Cystoscopy w/ ureteral stent placement (Bilateral, 3/25/2020); Insertion of tunneled central venous catheter (CVC) with subcutaneous port (N/A, 2020); Colonoscopy (N/A, 2022); and Colonoscopy (N/A, 2023).    Medications:   Gail has a current medication list which includes the following prescription(s): acetaminophen, bupropion, clindamycin phosphate 1%, doxycycline, duloxetine, encorafenib, ergocalciferol, granisetron, lactobacillus rhamnosus gg, levothyroxine, lidocaine-prilocaine, lorazepam, magic mouthwash diphen/antac/lidoc/nysta, mirtazapine, multivitamin, olmesartan, ondansetron, oxycodone-acetaminophen, prochlorperazine, and senna-docusate 8.6-50 mg.    Allergies:   Review of patient's allergies indicates:   Allergen Reactions    Oxaliplatin Other (See Comments)     Itchy hands, throat closed up, trouble hearing - during infusion    Penicillins Hives and Rash     childhood allergy  childhood allergy  unknown      Social History:   lives with their family  Occupation: does not work  Prior Level of Function: indep  Current Level of Function: none  Exercise Routine prior to onset: was on a walking program prior to onset  Dominant hand:  right   BMI: 45.80   Pain:  Current 0/10  Feet and knees hurt during transitions sit to stand  Functional Mobility (Bed mobility, transfers)  Bed mobility: I  Supine to sit: I  Sit to supine: I  Transfers to bed: I  Transfers to toilet: I  Sit to stand:  I  Stand pivot:  I  Car transfers: I      ADL's:  Feeding: I  Grooming: I  Hygiene: I  UB Dressing: I  LB Dressing: I  Toileting: I  Bathing: I    Pts goals: to get stronger      Objective   Vitals: 97% O2 and HR at 106bpm at rest  Lab Values as of 12/13/2023  Hemoglobin: 13.4 N  Hematocrit: 43.0 N  Platelets: 360 N  ANC: 3.1 N  GFR greater than 60 N    Vitals: 96% 104bpm at rest    Mental status: alert, oriented to person, place, and time  Appearance: Casually dressed  Behavior:  calm and cooperative  Attention Span and Concentration:  Normal    Postural examination/scapula alignment: Rounded shoulder, Head forward, Affected scapula elevated, and Slouched posture  Sensation:   Light Touch UEs  Intact  Light Touch LEs  Intact  Proprioception: Intact,         ROM:     Active/Passive ROM: (measured in degrees)   Shoulder, elbow and wrist all wfl for all planes of motion  Hip, knee and ankle all wfl for all planes of motion, except: neutral DF with gastroc tightness,hamstring tightness B    Strength: manual muscle test grades below     Upper Extremity Strength  (R) UE  (L) UE    Shoulder flexion: 4+/5 Shoulder flexion: 4+/5   Shoulder Abduction: 4+/5 Shoulder abduction: 4+/5   Shoulder ER 4/5 Shoulder ER 4/5   Shoulder IR 5/5 Shoulder IR 5/5   Elbow flexion: 5/5 Elbow flexion: 4+/5   Elbow extension: 4/5 Elbow extension: 4/5         Lower Extremity Strength  Right LE  Left LE    Hip Flexion: 4/5 Hip Flexion: 4/5   Hip Extension:  3+/5 Hip Extension: 3+/5   Hip  Abduction: 4/5 Hip Abduction: 4-/5   Knee Extension: 4+/5 Knee Extension: 4/5   Knee Flexion: 5/5 Knee Flexion: 5/5   Ankle Dorsiflexion: 4/5 Ankle Dorsiflexion: 4/5     Abdominal Strength: poor    Special Tests     Balance Assessment:     Evaluation   Single Limb Stance R LE Over 30 sec  (<10 sec = HIGH FALL RISK)   Single Limb Stance L LE 11  (<10 sec = HIGH FALL RISK)   Tandem stance LFF/RFF both over 30 sec      Evaluation   Timed Up and Go 13 sec  < 20 sec safe for independent transfers, < 30 sec safe for dependent transfers/assist required       Table: Population Norms for TUG    Age  Average TUG    60 - 69 years  8.1 seconds    70 - 79 years  9.2 seconds    80 - 99 years  11.3 seconds        Endurance:    6 Minute Walk Test Distance in feet: completed 2 min and 52 sec of 6 MWT with HR at 116 and O2 at 96%; 300 feet      GAIT DEVIATIONS:  Gail displays the following deviations with ambulation: decreased kamran, wide base of support     Endurance Assessment:   Evaluation   30 second Chair Rise  (adults > 61 y/o) 6 completed with no arms       TREATMENT     Total Treatment time separate from Evaluation: 10 minutes    Gail received therapeutic exercises to develop strength, endurance, ROM, flexibility, posture, and core stabilization for 10 minutes including:diaphragmatic breathing with HEP provided      Home Exercises and Patient Education Provided    Education provided: what current research advocates for in terms of type and amount of exercise as a cancer survivor, also diaphragmatic breathing  Written Home Exercises Provided: Patient instructed to cont prior HEP.  Exercises were reviewed and Gail was able to demonstrate them prior to the end of the session.  Gail demonstrated good  understanding of the education provided.     See EMR under Patient Instructions for exercises provided 12/14/2023.    Assessment   Gail is a 53 y.o. female referred to outpatient Physical Therapy with a medical diagnosis of  Malignant neoplasm of sigmoid colon with metastasis to intestinal lymph nodes. Pt presents with muscle weakness, decreased functional endurance, no home exercise program or regimen, lack of energy.    Pt prognosis is Good.   Pt will benefit from skilled outpatient Physical Therapy to address the deficits stated above and in the chart below, provide pt/family education, and to maximize pt's level of independence.     Plan of care discussed with patient: Yes  Pt's spiritual, cultural and educational needs considered and patient is agreeable to the plan of care and goals as stated below:     Anticipated Barriers for therapy: none    Medical Necessity is demonstrated by the following  History  Co-morbidities and personal factors that may impact the plan of care [] LOW: no personal factors / co-morbidities  [] MODERATE: 1-2 personal factors / co-morbidities  [x] HIGH: 3+ personal factors / co-morbidities    Moderate / High Support Documentation:   Co-morbidities affecting plan of care: mets of cancer,high BMI, HTN    Personal Factors:   none     Examination  Body Structures and Functions, activity limitations and participation restrictions that may impact the plan of care [] LOW: addressing 1-2 elements  [] MODERATE: 3+ elements  [x] HIGH: 4+ elements (please support below)    Moderate / High Support Documentation: decrease trunk, B UE and B LE strength, decreased sit to stand testing, decreased endurance     Clinical Presentation [] LOW: stable  [] MODERATE: Evolving  [x] HIGH: Unstable     Decision Making/ Complexity Score: high         Goals:  Short Term Goals:  3 weeks     1. Pt. to demonstrate increased strength for B lower extremity to at least 4+/5  to increase stability during community ambulation (progressing, not met)  2. Pt to demonstrate increased strength for B upper extremity to 4+/5 in order to perform functional activities (progressing, not met)  3.  Pt will improve 6MWT endurance test score to increase  by 10% in distance to ambulate safely in community (progressing, not met)  4 Pt will initiate home exercise program to improve strength, flexibility, endurance, mobility & balance to return pt to PLOF (progressing, not met)  5. Pt will tolerate 10 min or greater of time in light-->moderate intensity cardio (I.e. Bike, NuStep) to improve endurance. (progressing, not met)      Long Term Goals: 6 weeks  1.Pt will increase strength of  B  upper extremity to 5/5  in order to perform functional activities independently  (progressing, not met)  2. Pt. Will increase strength for B lower extremity to 5/5  to increase stability during ambulation on uneven surfaces,to increase tolerance for ADL and work activities. (progressing, not met)  3. Pt will be independent with HEP to improve ROM, strength, balance,  and independence with ADL's (progressing, not met)  4. Pt. will improve 6MWT endurance test score to 25% increase in distance in order to transfer independently and for patient to be in low risk for falls category (progressing, not met)  5. Patient will report compliance with walking program 5x week for 10 -30min each day to improve overall cardiovascular function and decrease cancer related fatigue at discharge. (progressing, not met)    Plan   Plan of care Certification: 12/14/2023 to 2/14/2023.    Outpatient Physical Therapy 2 times weekly for 6 weeks to include the following interventions: Manual Therapy, Neuromuscular Re-ed, Patient Education, Self Care, Therapeutic Activities, and Therapeutic Exercise.     Mary Kate Lindo, PT , CLT

## 2023-12-15 ENCOUNTER — DOCUMENTATION ONLY (OUTPATIENT)
Dept: INFUSION THERAPY | Facility: HOSPITAL | Age: 53
End: 2023-12-15
Payer: MEDICAID

## 2023-12-15 ENCOUNTER — OFFICE VISIT (OUTPATIENT)
Dept: PALLIATIVE MEDICINE | Facility: CLINIC | Age: 53
End: 2023-12-15
Payer: MEDICAID

## 2023-12-15 ENCOUNTER — INFUSION (OUTPATIENT)
Dept: INFUSION THERAPY | Facility: HOSPITAL | Age: 53
End: 2023-12-15
Attending: INTERNAL MEDICINE
Payer: MEDICAID

## 2023-12-15 ENCOUNTER — OFFICE VISIT (OUTPATIENT)
Dept: HEMATOLOGY/ONCOLOGY | Facility: CLINIC | Age: 53
End: 2023-12-15
Payer: MEDICAID

## 2023-12-15 VITALS
BODY MASS INDEX: 45.6 KG/M2 | HEART RATE: 94 BPM | SYSTOLIC BLOOD PRESSURE: 118 MMHG | TEMPERATURE: 98 F | RESPIRATION RATE: 18 BRPM | HEIGHT: 66 IN | DIASTOLIC BLOOD PRESSURE: 72 MMHG | WEIGHT: 283.75 LBS

## 2023-12-15 DIAGNOSIS — C77.2 METASTASIS TO INTESTINAL LYMPH NODE: ICD-10-CM

## 2023-12-15 DIAGNOSIS — C18.7 MALIGNANT NEOPLASM OF SIGMOID COLON: ICD-10-CM

## 2023-12-15 DIAGNOSIS — G89.3 CANCER RELATED PAIN: Primary | ICD-10-CM

## 2023-12-15 DIAGNOSIS — C18.7 MALIGNANT NEOPLASM OF SIGMOID COLON: Primary | ICD-10-CM

## 2023-12-15 DIAGNOSIS — Z51.5 PALLIATIVE CARE BY SPECIALIST: ICD-10-CM

## 2023-12-15 DIAGNOSIS — T45.1X5A CHEMOTHERAPY-INDUCED FATIGUE: Primary | ICD-10-CM

## 2023-12-15 DIAGNOSIS — R53.83 CHEMOTHERAPY-INDUCED FATIGUE: Primary | ICD-10-CM

## 2023-12-15 DIAGNOSIS — F41.9 ANXIETY: ICD-10-CM

## 2023-12-15 DIAGNOSIS — F51.04 PSYCHOPHYSIOLOGICAL INSOMNIA: ICD-10-CM

## 2023-12-15 PROCEDURE — 1159F MED LIST DOCD IN RCRD: CPT | Mod: CPTII,,, | Performed by: NURSE PRACTITIONER

## 2023-12-15 PROCEDURE — 4010F ACE/ARB THERAPY RXD/TAKEN: CPT | Mod: CPTII,,, | Performed by: NURSE PRACTITIONER

## 2023-12-15 PROCEDURE — 96375 TX/PRO/DX INJ NEW DRUG ADDON: CPT | Mod: PN

## 2023-12-15 PROCEDURE — 4010F PR ACE/ARB THEARPY RXD/TAKEN: ICD-10-PCS | Mod: CPTII,,, | Performed by: NURSE PRACTITIONER

## 2023-12-15 PROCEDURE — 63600175 PHARM REV CODE 636 W HCPCS: Mod: JZ,JG,PN | Performed by: INTERNAL MEDICINE

## 2023-12-15 PROCEDURE — 96413 CHEMO IV INFUSION 1 HR: CPT | Mod: PN

## 2023-12-15 PROCEDURE — 99213 OFFICE O/P EST LOW 20 MIN: CPT | Mod: PBBFAC,PN,25 | Performed by: NURSE PRACTITIONER

## 2023-12-15 PROCEDURE — 1160F RVW MEDS BY RX/DR IN RCRD: CPT | Mod: CPTII,,, | Performed by: NURSE PRACTITIONER

## 2023-12-15 PROCEDURE — 99215 OFFICE O/P EST HI 40 MIN: CPT | Mod: S$PBB,,, | Performed by: NURSE PRACTITIONER

## 2023-12-15 PROCEDURE — 96417 CHEMO IV INFUS EACH ADDL SEQ: CPT | Mod: PN

## 2023-12-15 PROCEDURE — 99999 PR PBB SHADOW E&M-EST. PATIENT-LVL III: CPT | Mod: PBBFAC,,, | Performed by: NURSE PRACTITIONER

## 2023-12-15 PROCEDURE — 1160F PR REVIEW ALL MEDS BY PRESCRIBER/CLIN PHARMACIST DOCUMENTED: ICD-10-PCS | Mod: CPTII,,, | Performed by: NURSE PRACTITIONER

## 2023-12-15 PROCEDURE — 96367 TX/PROPH/DG ADDL SEQ IV INF: CPT | Mod: PN

## 2023-12-15 PROCEDURE — 99214 OFFICE O/P EST MOD 30 MIN: CPT | Mod: S$PBB,,, | Performed by: NURSE PRACTITIONER

## 2023-12-15 PROCEDURE — 99999 PR PBB SHADOW E&M-EST. PATIENT-LVL III: ICD-10-PCS | Mod: PBBFAC,,, | Performed by: NURSE PRACTITIONER

## 2023-12-15 PROCEDURE — 25000003 PHARM REV CODE 250: Mod: PN | Performed by: INTERNAL MEDICINE

## 2023-12-15 PROCEDURE — 1159F PR MEDICATION LIST DOCUMENTED IN MEDICAL RECORD: ICD-10-PCS | Mod: CPTII,,, | Performed by: NURSE PRACTITIONER

## 2023-12-15 PROCEDURE — 99214 PR OFFICE/OUTPT VISIT, EST, LEVL IV, 30-39 MIN: ICD-10-PCS | Mod: S$PBB,,, | Performed by: NURSE PRACTITIONER

## 2023-12-15 PROCEDURE — 99215 PR OFFICE/OUTPT VISIT, EST, LEVL V, 40-54 MIN: ICD-10-PCS | Mod: S$PBB,,, | Performed by: NURSE PRACTITIONER

## 2023-12-15 PROCEDURE — 96415 CHEMO IV INFUSION ADDL HR: CPT | Mod: PN

## 2023-12-15 RX ORDER — ACETAMINOPHEN 325 MG/1
650 TABLET ORAL
Status: COMPLETED | OUTPATIENT
Start: 2023-12-15 | End: 2023-12-15

## 2023-12-15 RX ORDER — OXYCODONE AND ACETAMINOPHEN 10; 325 MG/1; MG/1
1 TABLET ORAL EVERY 4 HOURS PRN
Qty: 180 TABLET | Refills: 0 | Status: SHIPPED | OUTPATIENT
Start: 2023-12-15 | End: 2024-01-16

## 2023-12-15 RX ORDER — IBUPROFEN 600 MG/1
600 TABLET ORAL EVERY 8 HOURS PRN
Qty: 30 TABLET | Refills: 0 | Status: SHIPPED | OUTPATIENT
Start: 2023-12-15 | End: 2023-12-30

## 2023-12-15 RX ORDER — SENNOSIDES 8.6 MG/1
2 TABLET ORAL DAILY
Qty: 60 TABLET | Refills: 2 | Status: SHIPPED | OUTPATIENT
Start: 2023-12-15 | End: 2024-12-14

## 2023-12-15 RX ORDER — ONDANSETRON 2 MG/ML
8 INJECTION INTRAMUSCULAR; INTRAVENOUS
Status: COMPLETED | OUTPATIENT
Start: 2023-12-15 | End: 2023-12-15

## 2023-12-15 RX ORDER — HEPARIN 100 UNIT/ML
500 SYRINGE INTRAVENOUS
Status: DISCONTINUED | OUTPATIENT
Start: 2023-12-15 | End: 2023-12-15 | Stop reason: HOSPADM

## 2023-12-15 RX ADMIN — ONDANSETRON 8 MG: 2 INJECTION INTRAMUSCULAR; INTRAVENOUS at 09:12

## 2023-12-15 RX ADMIN — DIPHENHYDRAMINE HYDROCHLORIDE 50 MG: 50 INJECTION INTRAMUSCULAR; INTRAVENOUS at 09:12

## 2023-12-15 RX ADMIN — ACETAMINOPHEN 650 MG: 325 TABLET, FILM COATED ORAL at 09:12

## 2023-12-15 RX ADMIN — CETUXIMAB 1200 MG: 2 SOLUTION INTRAVENOUS at 10:12

## 2023-12-15 RX ADMIN — HYDROCORTISONE SODIUM SUCCINATE 50 MG: 100 INJECTION, POWDER, FOR SOLUTION INTRAMUSCULAR; INTRAVENOUS at 09:12

## 2023-12-15 RX ADMIN — SODIUM CHLORIDE: 9 INJECTION, SOLUTION INTRAVENOUS at 09:12

## 2023-12-15 NOTE — PROGRESS NOTES
Office Visit  [facility]      Consult Requested By: No ref. provider found  Reason for Consult: pain and symptom management      ASSESSMENT/PLAN:     Plan/Recommendations:  Diagnoses and all orders for this visit:    Cancer related pain  Malignant neoplasm of sigmoid colon  -     oxyCODONE-acetaminophen (PERCOCET)  mg per tablet; Take 1 tablet by mouth every 4 (four) hours as needed for Pain.  -     senna (SENOKOT) 8.6 mg tablet; Take 2 tablets by mouth once daily.  -     ibuprofen (ADVIL,MOTRIN) 600 MG tablet; Take 1 tablet (600 mg total) by mouth every 8 (eight) hours as needed for Pain- using sparingly             Continue to follow with Oncology    Palliative care by specialist  Stable mood, she has good family support, encouraged to continue working on self care  She is engaged with psych    Follow up: 1 month    SUBJECTIVE:     History of Present Illness:  Patient is a 53 y.o. 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 Referred by Dr. Santo.      Patient presents for routine follow up . She is patient of Dr Hill last seen in October, at that time she had improved symptoms with initiation of clinical trial however today she reports returned abdominal pain. Pain is felt most to lower abdomen, described as aching. Pain worst with movement and at night. She stays busy during the daytime as a distraction. She is taking perocot mostly in the night time as she does not want to be drowsy in the daytime  She denies Constipation. She does have some concern for advancing disease , she will have repeat scans in the new year.   Overall her mood is stable, she denies feeling hopeless , she has good support.  Discussed patient we will resume RX for percocet, she can use Ibuporfen during the daytime but sparingly.       Past Medical History:   Diagnosis Date    Anxiety      Depression     FH: ovarian cancer 3/16/2020    Hx of psychiatric care     Effexor, Paxil, Lexapro, Zoloft, Wellbutrin, Trazodone Buspar    Hyperthyroidism     Hypothyroid     Kidney calculi     Malignant neoplasm of sigmoid colon 3/16/2020    Menorrhagia     Multinodular goiter 2012    Palpitation     Psychiatric problem     Venous insufficiency      Past Surgical History:   Procedure Laterality Date     SECTION, CLASSIC      x3    COLONOSCOPY N/A 2020    Procedure: COLONOSCOPY;  Surgeon: Shane Parker MD;  Location: [facility] ENDO;  Service: Endoscopy;  Laterality: N/A;    COLONOSCOPY N/A 2022    Procedure: COLONOSCOPY;  Surgeon: Shane Parker MD;  Location: [facility] ENDO;  Service: Endoscopy;  Laterality: N/A;    COLONOSCOPY N/A 2023    Procedure: COLONOSCOPY;  Surgeon: Shane Parker MD;  Location: [facility] ENDO;  Service: Endoscopy;  Laterality: N/A;  no cecum anastomosis    CYSTOSCOPY W/ URETERAL STENT PLACEMENT Bilateral 3/25/2020    Procedure: CYSTOSCOPY, WITH URETERAL STENT INSERTION;  Surgeon: Claudio Tyson MD;  Location: [facility] OR [address] [place] [address];  Service: Urology;  Laterality: Bilateral;    INSERTION OF TUNNELED CENTRAL VENOUS CATHETER (CVC) WITH SUBCUTANEOUS PORT N/A 2020    Procedure: [id number]-IBWB-E-CATH;  Surgeon: [facility];  Location: [facility] OR [address] [place] [address];  Service: Radiology;  Laterality: N/A;  Room 189/[facility]    LAPAROSCOPIC SIGMOIDECTOMY N/A 3/25/2020    Procedure: COLECTOMY, SIGMOID, LAPAROSCOPIC, flex sig, ERAS high;  Surgeon: Silvio Man MD;  [facility];  Service: Colon and Rectal;  Laterality: N/A;    MOBILIZATION OF SPLENIC FLEXURE  3/25/2020    Procedure: MOBILIZATION, SPLENIC FLEXURE;  Surgeon: Silvio Man MD;  [facility];  Service: Colon and Rectal;;    tonsillectomy      TOTAL ABDOMINAL HYSTERECTOMY W/ BILATERAL SALPINGOOPHORECTOMY N/A 3/25/2020    Procedure: HYSTERECTOMY, TOTAL, ABDOMINAL, WITH  BILATERAL SALPINGO-OOPHORECTOMY;  Surgeon: Keron Brady MD;  Location: [facility] OR [address] [place] [address];  Service: Oncology;  Laterality: N/A;     Family History   Problem Relation Age of Onset    Heart disease Father     Diabetes Mother 65    Drug abuse Brother     Drug abuse Brother     Cancer Maternal Aunt         lung cancer    Cancer Maternal Grandmother         stomach cancer- started in ovaries    Ovarian cancer Maternal Grandmother         stomach cancer- started in ovaries    Colon cancer Paternal Uncle     Cancer Paternal Uncle     Ovarian cancer Maternal Aunt     Ovarian cancer Maternal Aunt     Ovarian cancer Cousin         mother had ovarian cancer    Drug abuse Son     Drug abuse Son         clean/sober since 2012    Colon cancer Son     No Known Problems Daughter     Uterine cancer Neg Hx     Breast cancer Neg Hx      Review of patient's allergies indicates:   Allergen Reactions    Oxaliplatin Other (See Comments)     Itchy hands, throat closed up, trouble hearing - during infusion    Penicillins Hives and Rash     childhood allergy  childhood allergy  unknown     Social Determinants of Health     Tobacco Use: Low Risk  (12/15/2023)    Patient History     Smoking Tobacco Use: Never     Smokeless Tobacco Use: Never     Passive Exposure: Not on file   Alcohol Use: Not on file   Financial Resource Strain: Not on file   Food Insecurity: Not on file   Transportation Needs: Not on file   Physical Activity: Not on file   Stress: Not on file   Social Connections: Not on file   Housing Stability: Not on file   Depression: Low Risk  (12/14/2023)    Depression     Last PHQ-4: Flowsheet Data: 2   Recent Concern: Depression - High Risk (11/1/2023)    Depression     Last PHQ-4: Flowsheet Data: 12      The Modified Caregiver Strain Index (MCSI) -   No data recorded   No data recorded   No data recorded   No data recorded   No data recorded   No data recorded   No data recorded   No data recorded   No data recorded   No data  recorded   No data recorded   No data recorded   No data recorded   No data recorded       Medications:    Current Outpatient Medications:     acetaminophen (TYLENOL) 500 MG tablet, Take 2 tablets (1,000 mg total) by mouth every 8 (eight) hours., Disp: 60 tablet, Rfl: 0    buPROPion (WELLBUTRIN SR) 150 MG TBSR 12 hr tablet, Take 1 tablet (150 mg total) by mouth 2 (two) times daily., Disp: 180 tablet, Rfl: 0    clindamycin phosphate 1% (CLINDAGEL) 1 % gel, Apply topically 2 (two) times daily., Disp: 60 g, Rfl: 3    doxycycline (VIBRA-TABS) 100 MG tablet, Take 1 tablet (100 mg total) by mouth 2 (two) times daily., Disp: 60 tablet, Rfl: 3    DULoxetine (CYMBALTA) 30 MG capsule, Take 2 capsules (60 mg total) by mouth once daily., Disp: 60 capsule, Rfl: 0    encorafenib 75 mg Cap, Take 4 capsules (300 mg total) by mouth once daily., Disp: 120 capsule, Rfl: 0    ergocalciferol (ERGOCALCIFEROL) 50,000 unit Cap, Take 1 capsule (50,000 Units total) by mouth every 7 days., Disp: 4 capsule, Rfl: 2    granisetron (SANCUSO) 3.1 mg/24 hour, Place 1 patch (3.1 mg total) onto the skin every 7 days AS DIRECTED., Disp: 4 patch, Rfl: 3    ibuprofen (ADVIL,MOTRIN) 600 MG tablet, Take 1 tablet (600 mg total) by mouth every 8 (eight) hours as needed for Pain., Disp: 30 tablet, Rfl: 0    Lactobacillus rhamnosus GG (CULTURELLE) 10 billion cell capsule, Take 1 capsule by mouth once daily., Disp: , Rfl:     levothyroxine (SYNTHROID) 175 MCG tablet, Take 2 tablets (350 mcg total) by mouth before breakfast., Disp: 60 tablet, Rfl: 11    LIDOcaine-prilocaine (EMLA) cream, Apply topically as needed (30 to 60 min prior chemo or port use)., Disp: 30 g, Rfl: 3    LORazepam (ATIVAN) 1 MG tablet, Take 1 tablet (1 mg total) by mouth every 12 (twelve) hours as needed for Anxiety (nausea)., Disp: 30 tablet, Rfl: 0    magic mouthwash diphen/antac/lidoc/nysta, Swish and swallow 5 mL every 4 hours as needed for mouth pain/ulcers, Disp: 120 mL, Rfl: 2     mirtazapine (REMERON) 7.5 MG Tab, Take 1 tablet (7.5 mg total) by mouth every evening., Disp: 30 tablet, Rfl: 0    multivitamin (THERAGRAN) per tablet, Take 1 tablet by mouth once daily., Disp: , Rfl:     olmesartan (BENICAR) 20 MG tablet, Take 1 tablet (20 mg total) by mouth once daily., Disp: 30 tablet, Rfl: 5    ondansetron (ZOFRAN-ODT) 8 MG TbDL, Take 1 tablet (8 mg total) by mouth every 8 (eight) hours as needed., Disp: 60 tablet, Rfl: 3    oxyCODONE-acetaminophen (PERCOCET)  mg per tablet, Take 1 tablet by mouth every 4 (four) hours as needed for Pain., Disp: 180 tablet, Rfl: 0    prochlorperazine (COMPAZINE) 10 MG tablet, Take 1 tablet (10 mg total) by mouth every 6 (six) hours as needed., Disp: 30 tablet, Rfl: 6    senna (SENOKOT) 8.6 mg tablet, Take 2 tablets by mouth once daily., Disp: 60 tablet, Rfl: 2    senna-docusate 8.6-50 mg (PERICOLACE) 8.6-50 mg per tablet, Take 2 tablets by mouth 2 (two) times daily., Disp: , Rfl:   No current facility-administered medications for this visit.    Facility-Administered Medications Ordered in Other Visits:     cetuximab (ERBITUX) 100 mg/50 mL chemo infusion 1,200 mg, 1,200 mg, Intravenous, 1 time in Clinic/HOD, Marah Santo MD, Last Rate: 150 mL/hr at 12/15/23 1035, 1,200 mg at 12/15/23 1035    heparin, porcine (PF) 100 unit/mL injection flush 500 Units, 500 Units, Intravenous, PRN, Marah Santo MD    INV nivolumab 480 mg in INV sodium chloride 0.9 % 100 mL chemo infusion, 480 mg, Intravenous, 1 time in Clinic/HOD, Marah Santo MD    OBJECTIVE:       ROS:  Review of Systems   Constitutional:  Negative for appetite change and unexpected weight change.   HENT:  Negative for mouth sores.    Eyes:  Negative for visual disturbance.   Respiratory:  Negative for cough and shortness of breath.    Cardiovascular:  Negative for chest pain.   Gastrointestinal:  Negative for abdominal pain, constipation and diarrhea.   Genitourinary:  Negative for frequency.    Musculoskeletal:  Positive for back pain.   Skin:  Negative for rash.   Neurological:  Negative for headaches.   Hematological:  Negative for adenopathy.       Review of Symptoms      Symptom Assessment (ESAS 0-10 Scale)  Pain:  0  Dyspnea:  0  Anxiety:  0  Nausea:  0  Depression:  2  Anorexia:  0  Fatigue:  0  Insomnia:  0  Restlessness:  0  Agitation:  0       Constipation:  No constipation    Performance Status:  80    ECOG Performance Status stGstrstastdstest:st st1st Living Arrangements:  Lives with family and Lives in home    Psychosocial/Cultural:   See Palliative Psychosocial Note: Yes  **Primary  to Follow**  Palliative Care  Consult: No      Advance Care Planning   Advance Directives:   Living Will: No    LaPOST: No    Do Not Resuscitate Status: No      Decision Making:  Patient answered questions  Goals of Care: What is most important right now is to focus on symptom/pain control. Accordingly, we have decided that the best plan to meet the patient's goals includes continuing with treatment.              Physical Exam:  Vitals:    Physical Exam  Vitals reviewed.   Constitutional:       General: She is not in acute distress.     Appearance: Normal appearance. She is obese. She is not ill-appearing or toxic-appearing.   HENT:      Mouth/Throat:      Mouth: Mucous membranes are moist.      Pharynx: Oropharynx is clear.   Eyes:      General: No scleral icterus.     Conjunctiva/sclera: Conjunctivae normal.   Pulmonary:      Effort: Pulmonary effort is normal. No respiratory distress.   Abdominal:      General: Abdomen is flat. There is no distension.      Palpations: Abdomen is soft.      Tenderness: There is no abdominal tenderness.   Musculoskeletal:         General: Normal range of motion.      Cervical back: Normal range of motion.      Right lower leg: No edema.      Left lower leg: No edema.   Skin:     General: Skin is warm and dry.      Coloration: Skin is not jaundiced or pale.    Neurological:      Mental Status: She is alert and oriented to person, place, and time.   Psychiatric:         Mood and Affect: Mood normal.         Behavior: Behavior normal.         Thought Content: Thought content normal.         Judgment: Judgment normal.         Labs:  CBC:   WBC   Date Value Ref Range Status   12/13/2023 5.51 3.90 - 12.70 K/uL Final     Hemoglobin   Date Value Ref Range Status   12/13/2023 13.4 12.0 - 16.0 g/dL Final     Hematocrit   Date Value Ref Range Status   12/13/2023 43.0 37.0 - 48.5 % Final     MCV   Date Value Ref Range Status   12/13/2023 80 (L) 82 - 98 fL Final     Platelets   Date Value Ref Range Status   12/13/2023 360 150 - 450 K/uL Final       LFT:   Lab Results   Component Value Date    AST 13 12/13/2023    ALKPHOS 131 12/13/2023    BILITOT 0.4 12/13/2023       Albumin:   Albumin   Date Value Ref Range Status   12/13/2023 3.7 3.5 - 5.2 g/dL Final     Protein:   Total Protein   Date Value Ref Range Status   12/13/2023 7.0 6.0 - 8.4 g/dL Final       Radiology: None    30 minutes of total time spent on the encounter, which includes face to face time and non-face to face time preparing to see the patient (eg, review of tests), Obtaining and/or reviewing separately obtained history, Documenting clinical information in the electronic or other health record, Independently interpreting results (not separately reported) and communicating results to the patient/family/caregiver, or Care coordination (not separately reported).      Signature: Fawn Snyder NP

## 2023-12-15 NOTE — PROGRESS NOTES
"Gail Mckinnon  53 y.o. is here to seek an integrative approach to discuss side effects related to colon cancer treatment.   HPI  Mrs. Mckinnon reports she went for a colonoscopy in 2020 and was diagnosed with colon cancer with mets. She had a complete hysterectomy and a colon resection and has been on chemotherapy since then. She is on cycle 4 of this trial. She states she was very sick with the previous chemo, but she is much better on this regimen. She is not sleeping very well, but does take Remeron as needed. She does not have a good appetite most of the time although her taste is returning which is helpful. She does drink an Ensure daily. She does feel like her appetite is increasing on this treatment. She tries to be active around the house, but easily fatigues. She states, "I feel like it is a cycle of not doing anything from surgery and treatment that is causing the fatigue." She states, "It is horrible to feel this way as I am usually so active."She reports her stress is better now than previously, but she does have moderate stress. She is learning coping techniques which helps. She states she also stresses regarding finances and possibly finding a part time job. She is seeing Dr. Batres for psychology and she reports this is helpful She has had low back pain for about 3 years. She is unable to stand for long periods without pain.   She id  and has 3 grown children. 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 She has a good support system.     Today's Visit  Mrs. Reynolds is here today getting chemo. She had her first physical therapy yesterday. She will start after the New Year as she is going out of town. She reports she is sleeping better since getting her thyroid medication normalized, but she does remain fatigued. She reports her stress and anxiety are less than previously and rates it at less than 5/10. She reports a good appetite and she has " cut her sugar intake. She finds the more sugar she eats, the more she continues to want to eat.  Her activity level remains low, but she is hoping going to PT will help with her fatigue and help increase her activity level.     Pillars Assessment    Sleep  How many hours of sleep per night? 7-8  Do you have trouble falling asleep, staying asleep or waking up earlier than you need to? no  Do you have daytime fatigue? yes  Do you need medication for sleep? yes Remeron as needed  Do you use any supplements or other interventions for sleep? no    Resilience  Rate your current level of stress- < 5/10    Nutrition   Food allergies or sensitivities: no  Do you adhere to a particular type of diet? no  Do you have any concerns with your eating habits? no    Exercise  How would you describe your physical activity level? Low    Past Medical History  Past Medical History:   Diagnosis Date    Anxiety     Depression     FH: ovarian cancer 3/16/2020    Hx of psychiatric care     Effexor, Paxil, Lexapro, Zoloft, Wellbutrin, Trazodone Buspar    Hyperthyroidism     Hypothyroid     Kidney calculi     Malignant neoplasm of sigmoid colon 3/16/2020    Menorrhagia     Multinodular goiter 2012    Palpitation     Psychiatric problem     Venous insufficiency       Past Surgical History   Past Surgical History:   Procedure Laterality Date     SECTION, CLASSIC      x3    COLONOSCOPY N/A 2020    Procedure: COLONOSCOPY;  Surgeon: Shane Parker MD;  Location: [facility];  Service: Endoscopy;  Laterality: N/A;    COLONOSCOPY N/A 2022    Procedure: COLONOSCOPY;  Surgeon: Shane Parker MD;  Location: [facility];  Service: Endoscopy;  Laterality: N/A;    COLONOSCOPY N/A 2023    Procedure: COLONOSCOPY;  Surgeon: Shane Parker MD;  Location: [facility];  Service: Endoscopy;  Laterality: N/A;  no cecum anastomosis    CYSTOSCOPY W/ URETERAL STENT PLACEMENT Bilateral 3/25/2020    Procedure: CYSTOSCOPY, WITH  URETERAL STENT INSERTION;  Surgeon: Claudio Tyson MD;  [facility];  Service: Urology;  Laterality: Bilateral;    INSERTION OF TUNNELED CENTRAL VENOUS CATHETER (CVC) WITH SUBCUTANEOUS PORT N/A 5/1/2020    Procedure: [id number];  Surgeon: Stephen Diagnostic Provider;  [facility];  Service: Radiology;  Laterality: N/A;  Room 189/Cindy    LAPAROSCOPIC SIGMOIDECTOMY N/A 3/25/2020    Procedure: COLECTOMY, SIGMOID, LAPAROSCOPIC, flex sig, ERAS high;  Surgeon: Silvio Man MD;  [facility];  Service: Colon and Rectal;  Laterality: N/A;    MOBILIZATION OF SPLENIC FLEXURE  3/25/2020    Procedure: MOBILIZATION, SPLENIC FLEXURE;  Surgeon: Silvio Man MD;  [facility];  Service: Colon and Rectal;;    tonsillectomy      TOTAL ABDOMINAL HYSTERECTOMY W/ BILATERAL SALPINGOOPHORECTOMY N/A 3/25/2020    Procedure: HYSTERECTOMY, TOTAL, ABDOMINAL, WITH BILATERAL SALPINGO-OOPHORECTOMY;  Surgeon: Keron Brady MD;  [facility];  Service: Oncology;  Laterality: N/A;      Family History   Family History   Problem Relation Age of Onset    Heart disease Father     Diabetes Mother 65    Drug abuse Brother     Drug abuse Brother     Cancer Maternal Aunt         lung cancer    Cancer Maternal Grandmother         stomach cancer- started in ovaries    Ovarian cancer Maternal Grandmother         stomach cancer- started in ovaries    Colon cancer Paternal Uncle     Cancer Paternal Uncle     Ovarian cancer Maternal Aunt     Ovarian cancer Maternal Aunt     Ovarian cancer Cousin         mother had ovarian cancer    Drug abuse Son     Drug abuse Son         clean/sober since 2012    Colon cancer Son     No Known Problems Daughter     Uterine cancer Neg Hx     Breast cancer Neg Hx       Allergies  Review of patient's allergies indicates:   Allergen Reactions    Oxaliplatin Other (See Comments)     Itchy hands, throat closed up, trouble hearing -  during infusion    Penicillins Hives and Rash     childhood allergy  childhood allergy  unknown      Current Medications:    Current Outpatient Medications:     acetaminophen (TYLENOL) 500 MG tablet, Take 2 tablets (1,000 mg total) by mouth every 8 (eight) hours., Disp: 60 tablet, Rfl: 0    buPROPion (WELLBUTRIN SR) 150 MG TBSR 12 hr tablet, Take 1 tablet (150 mg total) by mouth 2 (two) times daily., Disp: 180 tablet, Rfl: 0    clindamycin phosphate 1% (CLINDAGEL) 1 % gel, Apply topically 2 (two) times daily., Disp: 60 g, Rfl: 3    doxycycline (VIBRA-TABS) 100 MG tablet, Take 1 tablet (100 mg total) by mouth 2 (two) times daily., Disp: 60 tablet, Rfl: 3    DULoxetine (CYMBALTA) 30 MG capsule, Take 2 capsules (60 mg total) by mouth once daily., Disp: 60 capsule, Rfl: 0    encorafenib 75 mg Cap, Take 4 capsules (300 mg total) by mouth once daily., Disp: 120 capsule, Rfl: 0    ergocalciferol (ERGOCALCIFEROL) 50,000 unit Cap, Take 1 capsule (50,000 Units total) by mouth every 7 days., Disp: 4 capsule, Rfl: 2    granisetron (SANCUSO) 3.1 mg/24 hour, Place 1 patch (3.1 mg total) onto the skin every 7 days AS DIRECTED., Disp: 4 patch, Rfl: 3    ibuprofen (ADVIL,MOTRIN) 600 MG tablet, Take 1 tablet (600 mg total) by mouth every 8 (eight) hours as needed for Pain., Disp: 30 tablet, Rfl: 0    Lactobacillus rhamnosus GG (CULTURELLE) 10 billion cell capsule, Take 1 capsule by mouth once daily., Disp: , Rfl:     levothyroxine (SYNTHROID) 175 MCG tablet, Take 2 tablets (350 mcg total) by mouth before breakfast., Disp: 60 tablet, Rfl: 11    LIDOcaine-prilocaine (EMLA) cream, Apply topically as needed (30 to 60 min prior chemo or port use)., Disp: 30 g, Rfl: 3    LORazepam (ATIVAN) 1 MG tablet, Take 1 tablet (1 mg total) by mouth every 12 (twelve) hours as needed for Anxiety (nausea)., Disp: 30 tablet, Rfl: 0    magic mouthwash diphen/antac/lidoc/nysta, Swish and swallow 5 mL every 4 hours as needed for mouth pain/ulcers, Disp: 120  mL, Rfl: 2    mirtazapine (REMERON) 7.5 MG Tab, Take 1 tablet (7.5 mg total) by mouth every evening., Disp: 30 tablet, Rfl: 0    multivitamin (THERAGRAN) per tablet, Take 1 tablet by mouth once daily., Disp: , Rfl:     olmesartan (BENICAR) 20 MG tablet, Take 1 tablet (20 mg total) by mouth once daily., Disp: 30 tablet, Rfl: 5    ondansetron (ZOFRAN-ODT) 8 MG TbDL, Take 1 tablet (8 mg total) by mouth every 8 (eight) hours as needed., Disp: 60 tablet, Rfl: 3    oxyCODONE-acetaminophen (PERCOCET)  mg per tablet, Take 1 tablet by mouth every 4 (four) hours as needed for Pain., Disp: 180 tablet, Rfl: 0    prochlorperazine (COMPAZINE) 10 MG tablet, Take 1 tablet (10 mg total) by mouth every 6 (six) hours as needed., Disp: 30 tablet, Rfl: 6    senna (SENOKOT) 8.6 mg tablet, Take 2 tablets by mouth once daily., Disp: 60 tablet, Rfl: 2    senna-docusate 8.6-50 mg (PERICOLACE) 8.6-50 mg per tablet, Take 2 tablets by mouth 2 (two) times daily., Disp: , Rfl:   No current facility-administered medications for this visit.    Facility-Administered Medications Ordered in Other Visits:     cetuximab (ERBITUX) 100 mg/50 mL chemo infusion 1,200 mg, 1,200 mg, Intravenous, 1 time in Clinic/HOD, Marah Santo MD, Last Rate: 150 mL/hr at 12/15/23 1035, 1,200 mg at 12/15/23 1035    heparin, porcine (PF) 100 unit/mL injection flush 500 Units, 500 Units, Intravenous, PRN, Marah Santo MD    INV nivolumab 480 mg in INV sodium chloride 0.9 % 100 mL chemo infusion, 480 mg, Intravenous, 1 time in Clinic/HOD, Marah Santo MD     Review of Systems  Review of Systems   Constitutional:  Positive for malaise/fatigue.   HENT: Negative.     Eyes: Negative.    Respiratory: Negative.     Cardiovascular: Negative.    Gastrointestinal: Negative.    Genitourinary: Negative.    Musculoskeletal:  Positive for back pain.   Skin: Negative.    Neurological: Negative.    Endo/Heme/Allergies: Negative.    Psychiatric/Behavioral:  The patient is  nervous/anxious and has insomnia.       Physical Exam      /73  Temp 97.8  HR 97  RR 16    Physical Exam  Vitals reviewed.   Constitutional:       Appearance: Normal appearance.   Neurological:      Mental Status: She is alert.   Psychiatric:         Mood and Affect: Mood normal.         Behavior: Behavior normal.        ASSESSMENT :  1. Chemotherapy-induced fatigue    2. Psychophysiological insomnia    3. Anxiety    4. Malignant neoplasm of sigmoid colon      PLAN:  Reviewed all information discussed at today's visit and all questions were answered.    Counseled on healthy lifestyle and behavior modifications   Continue Physical therapy  Continue Oncology Psychology  See nutrition during treatment as needed  I discussed and recommended the following support services:  [employer] [employer] I suggested [place] [employer] and/or Yoga as these practices reduce stress, increases flexibility and muscle strength, improves balance and promotes serenity in the power of movement to help fight disease and boost your immune system.   Music and relaxation therapy and Meditation which can decrease stress by lowering blood pressure, slowing breathing, and helping you be more present in the moment. It improves sleep by relaxing the body and mind at the end of the day.Meditation also trains you how to focus on one thing at a time, improving concentration. It also promotes emotional well-being by decreasing depression and anxiety, and helping create a more positive outlook on life.    Follow up with Integrative Services in 4 months    I spent a total of 42 minutes on the day of the visit.This includes face to face time and non-face to face time preparing to see the patient (eg, review of tests), obtaining and/or reviewing separately obtained history, documenting clinical information in the electronic or other health record, independently interpreting results and communicating results to the patient/family/caregiver, or care coordinator.

## 2023-12-15 NOTE — PROGRESS NOTES
Oncology Nutrition   Chemotherapy Infusion Visit    Nutrition Follow Up   RD met with patient at chairside during infusion treatment. Pt reports continues to do well nutritionally- eating without difficulty, maintaining weight, and denies nutrition related side effects.    Pt eligible for TFP. Denies need today, states will likely get it next time.     Wt Readings from Last 10 Encounters:   12/15/23 128.7 kg (283 lb 11.7 oz)   12/14/23 128.7 kg (283 lb 11.7 oz)   12/01/23 129.9 kg (286 lb 6 oz)   11/30/23 129.9 kg (286 lb 6 oz)   11/22/23 129.6 kg (285 lb 11.5 oz)   11/17/23 129.9 kg (286 lb 6 oz)   11/16/23 129.9 kg (286 lb 6 oz)   11/03/23 130 kg (286 lb 9.6 oz)   11/02/23 130 kg (286 lb 9.6 oz)   10/30/23 128 kg (282 lb 3 oz)       All other nutrition questions/concerns addressed as appropriate. Will continue to follow and monitor throughout treatment PRN.     Raquel Bullock, MS, RD, LDN  12/15/2023  10:49 AM

## 2023-12-15 NOTE — PLAN OF CARE
Pt arrived to clinic today for Opdivo and Erbitux infusion and tolerated well. No changes throughout therapy. Pt aware of follow up appointments and side effects of drugs. Discharged to home. NAD.

## 2023-12-15 NOTE — PATIENT INSTRUCTIONS
-     oxyCODONE-acetaminophen (PERCOCET)  mg per tablet; Take 1 tablet by mouth every 4 (four) hours as needed for Pain.    -     senna (SENOKOT) 8.6 mg tablet; Take 2 tablets by mouth once daily.    -     ibuprofen (ADVIL,MOTRIN) 600 MG tablet; Take 1 tablet (600 mg total) by mouth every 8 (eight) hours as needed for Pain- using sparingly

## 2023-12-18 ENCOUNTER — RESEARCH ENCOUNTER (OUTPATIENT)
Dept: HEMATOLOGY/ONCOLOGY | Facility: CLINIC | Age: 53
End: 2023-12-18
Payer: MEDICAID

## 2023-12-18 NOTE — PROGRESS NOTES
Gail Mckinnon, MRN [id number]  PID# [id number]     Protocol: SWOG   IRB#: 2023.047  : NGUYEN Degroot MD  Treating Investigator: JESUS Santo MD     December 15th, 2023     Randomized Phase II Trial of Encorafenib and Cetuximab with or Without Nivolumab (NSC #[id number]) for Patients with Previously Treated, Microsatellite Stable BRAF V600E Metastatic and/or Unresectable Colorectal Cancer   Cycle 1 Day 15 Arm 1/ Nivolumab + Cetuximab + Encorafenib therapy.     C5 Day 1 December 15, 2023.      The patient presented to the [facility] 3rd floor infusion center Cycle 5 Day 1 protocol treatment. - MAR attached. Vital signs WNL  The patient was seen by the treating MD [id number] and deemed eligible for C5 D1     The patient was alert, oriented, without noted distress, with appropriate mood and affect for the situation, and freely agreed to continue with participating in the referenced study and consented to blood specimens for banking.    The patient denies any changes in health or medications since [id number] office visit with Dr. Santo.    [id number] pre-tx labs: CBC w/ diff, CMP, Magnesium, and imaging resulted and reviewed by this CRC and the treating MD at [id number] MD appointment.   Endocrinology following TSH for patient's baseline Grade II Hypothyroidism (resulted from treatment for Hyperthyroidism). See communication from study chair regarding TSH monitoring for this patient in Cycle 1 section of shadow chart.   See source doc communications concerning elevated [id number] TSH with WNL Free T4 with Dr. Arechiga, Study Chair, in Cycle 3 Day 15 section.  [id number] TSH WNL. The patient's dose of Synthroid was increased by treating endocrinologist.   Per Section 7.4- Administration of cetuximab will be dosed every 14 days (+/- 7 days).   This CRC reviewed upcoming appointments with the patient and encouraged to call with any new symptoms or issues, the patient verbalized understanding. Patient made aware to call after  hour number, that she agrees to having, if after business hours or over the weekend.      Per Infusion nurse, the patient tolerated C5D1 treatment without any noted adverse events.      Next protocol required bio-specimen (Whole Blood) due at progression of disease. (Section 9.0 Study Calendar)    Next CT scan is planned for 04JAN2024 13:30, patient informed. (See 14DEC CRC note).      Next Every 8-week CT C/A/P (Section 7.5) pending 04JAN2024 CT results.     Cycle 5 Day 15:     [id number] 1st floor lab @ 13:35   [id number] OV with Dr. Villanueva @ 2:00 (Dr. Santo will be OOO)   See source doc from Study chair dated 8/31/2023 (in patient shadow chart) allowing 3-day window of toxicity assessments.     [id number] Infusion at 08:30     DAYDAY Adams RN

## 2023-12-26 ENCOUNTER — TELEPHONE (OUTPATIENT)
Dept: HEMATOLOGY/ONCOLOGY | Facility: CLINIC | Age: 53
End: 2023-12-26
Payer: MEDICAID

## 2023-12-26 NOTE — TELEPHONE ENCOUNTER
Spoke with the pt and the pt stated that she was in [place] and wanted us to find a place where she can do her labs in [place] but the other issue was the md appt's she wanted me to change it to a vv but we do not have nothing else this wk. Pt stated that she will talk with Denita. Pt verbalized and understanding. I stated to the it might be delayed with tx.

## 2023-12-26 NOTE — TELEPHONE ENCOUNTER
Left message to call the office to schedule/reschedule appt. Recall number provided.  ----- Message from Nataly Pedro sent at 12/26/2023 11:43 AM CST -----  Regarding: Dr. Villanueva  Type: Needs Medical Advice  Who Called:  Patient   Symptoms (please be specific):    How long has patient had these symptoms:    Pharmacy name and phone #:    Best Call Back Number: [phone]  Additional Information: Patient is requesting a call back  regarding rescheduling her appt. on 12/27 to a virtual appt. due to her being out of town.

## 2023-12-29 ENCOUNTER — RESEARCH ENCOUNTER (OUTPATIENT)
Dept: RESEARCH | Facility: HOSPITAL | Age: 53
End: 2023-12-29
Payer: MEDICAID

## 2023-12-29 ENCOUNTER — INFUSION (OUTPATIENT)
Dept: INFUSION THERAPY | Facility: HOSPITAL | Age: 53
End: 2023-12-29
Attending: INTERNAL MEDICINE
Payer: MEDICAID

## 2023-12-29 ENCOUNTER — DOCUMENTATION ONLY (OUTPATIENT)
Dept: INFUSION THERAPY | Facility: HOSPITAL | Age: 53
End: 2023-12-29
Payer: MEDICAID

## 2023-12-29 ENCOUNTER — OFFICE VISIT (OUTPATIENT)
Dept: HEMATOLOGY/ONCOLOGY | Facility: CLINIC | Age: 53
End: 2023-12-29
Payer: MEDICAID

## 2023-12-29 VITALS
WEIGHT: 285.5 LBS | BODY MASS INDEX: 45.88 KG/M2 | HEART RATE: 97 BPM | DIASTOLIC BLOOD PRESSURE: 69 MMHG | OXYGEN SATURATION: 96 % | SYSTOLIC BLOOD PRESSURE: 112 MMHG | HEIGHT: 66 IN | TEMPERATURE: 97 F | RESPIRATION RATE: 18 BRPM

## 2023-12-29 VITALS
HEART RATE: 98 BPM | HEIGHT: 66 IN | SYSTOLIC BLOOD PRESSURE: 110 MMHG | WEIGHT: 285.5 LBS | RESPIRATION RATE: 21 BRPM | TEMPERATURE: 97 F | OXYGEN SATURATION: 96 % | BODY MASS INDEX: 45.88 KG/M2 | DIASTOLIC BLOOD PRESSURE: 73 MMHG

## 2023-12-29 DIAGNOSIS — D50.9 MICROCYTIC ANEMIA: ICD-10-CM

## 2023-12-29 DIAGNOSIS — C18.7 MALIGNANT NEOPLASM OF SIGMOID COLON: Primary | ICD-10-CM

## 2023-12-29 DIAGNOSIS — C78.6 SECONDARY MALIGNANT NEOPLASM OF RETROPERITONEUM: ICD-10-CM

## 2023-12-29 DIAGNOSIS — C77.2 METASTASIS TO INTESTINAL LYMPH NODE: ICD-10-CM

## 2023-12-29 DIAGNOSIS — C77.9 SECONDARY ADENOCARCINOMA OF LYMPH NODE: ICD-10-CM

## 2023-12-29 PROCEDURE — 99214 OFFICE O/P EST MOD 30 MIN: CPT | Mod: S$PBB,,, | Performed by: STUDENT IN AN ORGANIZED HEALTH CARE EDUCATION/TRAINING PROGRAM

## 2023-12-29 PROCEDURE — 4010F PR ACE/ARB THEARPY RXD/TAKEN: ICD-10-PCS | Mod: CPTII,,, | Performed by: STUDENT IN AN ORGANIZED HEALTH CARE EDUCATION/TRAINING PROGRAM

## 2023-12-29 PROCEDURE — 3074F SYST BP LT 130 MM HG: CPT | Mod: CPTII,,, | Performed by: STUDENT IN AN ORGANIZED HEALTH CARE EDUCATION/TRAINING PROGRAM

## 2023-12-29 PROCEDURE — 25000003 PHARM REV CODE 250: Mod: PN | Performed by: INTERNAL MEDICINE

## 2023-12-29 PROCEDURE — 3008F PR BODY MASS INDEX (BMI) DOCUMENTED: ICD-10-PCS | Mod: CPTII,,, | Performed by: STUDENT IN AN ORGANIZED HEALTH CARE EDUCATION/TRAINING PROGRAM

## 2023-12-29 PROCEDURE — 99999 PR PBB SHADOW E&M-EST. PATIENT-LVL V: CPT | Mod: PBBFAC,,, | Performed by: STUDENT IN AN ORGANIZED HEALTH CARE EDUCATION/TRAINING PROGRAM

## 2023-12-29 PROCEDURE — 3008F BODY MASS INDEX DOCD: CPT | Mod: CPTII,,, | Performed by: STUDENT IN AN ORGANIZED HEALTH CARE EDUCATION/TRAINING PROGRAM

## 2023-12-29 PROCEDURE — 96367 TX/PROPH/DG ADDL SEQ IV INF: CPT | Mod: PN

## 2023-12-29 PROCEDURE — 63600175 PHARM REV CODE 636 W HCPCS: Mod: PN | Performed by: INTERNAL MEDICINE

## 2023-12-29 PROCEDURE — 3074F PR MOST RECENT SYSTOLIC BLOOD PRESSURE < 130 MM HG: ICD-10-PCS | Mod: CPTII,,, | Performed by: STUDENT IN AN ORGANIZED HEALTH CARE EDUCATION/TRAINING PROGRAM

## 2023-12-29 PROCEDURE — 99214 PR OFFICE/OUTPT VISIT, EST, LEVL IV, 30-39 MIN: ICD-10-PCS | Mod: S$PBB,,, | Performed by: STUDENT IN AN ORGANIZED HEALTH CARE EDUCATION/TRAINING PROGRAM

## 2023-12-29 PROCEDURE — 4010F ACE/ARB THERAPY RXD/TAKEN: CPT | Mod: CPTII,,, | Performed by: STUDENT IN AN ORGANIZED HEALTH CARE EDUCATION/TRAINING PROGRAM

## 2023-12-29 PROCEDURE — 3078F PR MOST RECENT DIASTOLIC BLOOD PRESSURE < 80 MM HG: ICD-10-PCS | Mod: CPTII,,, | Performed by: STUDENT IN AN ORGANIZED HEALTH CARE EDUCATION/TRAINING PROGRAM

## 2023-12-29 PROCEDURE — 99215 OFFICE O/P EST HI 40 MIN: CPT | Mod: PBBFAC,PN | Performed by: STUDENT IN AN ORGANIZED HEALTH CARE EDUCATION/TRAINING PROGRAM

## 2023-12-29 PROCEDURE — 96413 CHEMO IV INFUSION 1 HR: CPT | Mod: PN

## 2023-12-29 PROCEDURE — 96375 TX/PRO/DX INJ NEW DRUG ADDON: CPT | Mod: PN

## 2023-12-29 PROCEDURE — 3078F DIAST BP <80 MM HG: CPT | Mod: CPTII,,, | Performed by: STUDENT IN AN ORGANIZED HEALTH CARE EDUCATION/TRAINING PROGRAM

## 2023-12-29 PROCEDURE — 96415 CHEMO IV INFUSION ADDL HR: CPT | Mod: PN

## 2023-12-29 PROCEDURE — 99999 PR PBB SHADOW E&M-EST. PATIENT-LVL V: ICD-10-PCS | Mod: PBBFAC,,, | Performed by: STUDENT IN AN ORGANIZED HEALTH CARE EDUCATION/TRAINING PROGRAM

## 2023-12-29 RX ORDER — ONDANSETRON 2 MG/ML
8 INJECTION INTRAMUSCULAR; INTRAVENOUS
Status: COMPLETED | OUTPATIENT
Start: 2023-12-29 | End: 2023-12-29

## 2023-12-29 RX ORDER — SODIUM CHLORIDE 0.9 % (FLUSH) 0.9 %
10 SYRINGE (ML) INJECTION
Status: DISCONTINUED | OUTPATIENT
Start: 2023-12-29 | End: 2023-12-29 | Stop reason: HOSPADM

## 2023-12-29 RX ORDER — ACETAMINOPHEN 325 MG/1
650 TABLET ORAL
Status: COMPLETED | OUTPATIENT
Start: 2023-12-29 | End: 2023-12-29

## 2023-12-29 RX ORDER — EPINEPHRINE 0.3 MG/.3ML
0.3 INJECTION SUBCUTANEOUS ONCE AS NEEDED
Status: DISCONTINUED | OUTPATIENT
Start: 2023-12-29 | End: 2023-12-29 | Stop reason: HOSPADM

## 2023-12-29 RX ORDER — DIPHENHYDRAMINE HYDROCHLORIDE 50 MG/ML
50 INJECTION INTRAMUSCULAR; INTRAVENOUS ONCE AS NEEDED
Status: DISCONTINUED | OUTPATIENT
Start: 2023-12-29 | End: 2023-12-29 | Stop reason: HOSPADM

## 2023-12-29 RX ORDER — HEPARIN 100 UNIT/ML
500 SYRINGE INTRAVENOUS
Status: DISCONTINUED | OUTPATIENT
Start: 2023-12-29 | End: 2023-12-29 | Stop reason: HOSPADM

## 2023-12-29 RX ADMIN — ONDANSETRON 8 MG: 2 INJECTION INTRAMUSCULAR; INTRAVENOUS at 09:12

## 2023-12-29 RX ADMIN — Medication 500 UNITS: at 03:12

## 2023-12-29 RX ADMIN — SODIUM CHLORIDE: 9 INJECTION, SOLUTION INTRAVENOUS at 09:12

## 2023-12-29 RX ADMIN — ACETAMINOPHEN 650 MG: 325 TABLET, FILM COATED ORAL at 09:12

## 2023-12-29 RX ADMIN — DIPHENHYDRAMINE HYDROCHLORIDE 50 MG: 50 INJECTION, SOLUTION INTRAMUSCULAR; INTRAVENOUS at 09:12

## 2023-12-29 RX ADMIN — HYDROCORTISONE SODIUM SUCCINATE 50 MG: 100 INJECTION, POWDER, FOR SOLUTION INTRAMUSCULAR; INTRAVENOUS at 09:12

## 2023-12-29 RX ADMIN — CETUXIMAB 1200 MG: 2 SOLUTION INTRAVENOUS at 10:12

## 2023-12-29 NOTE — PLAN OF CARE
Problem: Adult Inpatient Plan of Care  Goal: Plan of Care Review  Outcome: Ongoing, Progressing  Flowsheets (Taken 12/29/2023 2544)  Plan of Care Reviewed With: patient   Pt tolerated Erbitux infusion well.   Stay for 1hr post observation.   No adverse reaction noted.  PAC flushed with Heparin and de-accessed per protocol.   Pt left clinic in no acute distress.     Detail Level: Detailed Add 11865 Cpt? (Important Note: In 2017 The Use Of 15099 Is Being Tracked By Cms To Determine Future Global Period Reimbursement For Global Periods): no

## 2023-12-29 NOTE — PROGRESS NOTES
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     December 29, 2023--> Cycle 5 Day 15     The patient presented to the planned office visit alone. The patient was alert, oriented, without noted distress, with appropriate mood and affect for the situation, and freely agreed to continue with participating in the referenced study.  Per Section 7.4- Administration of cetuximab will be dosed every 14 days (+/- 7 days).    The patient is being seen by Dr. Villanueva today related to Dr. aSnto being out of the office. Regulatory confirmed Dr. Villanueva is listed on the DOA log for the study.     [id number] pre-tx labs: CBC w/ diff, CMP, Magnesium resulted and reviewed by Dr. Villanueva and this CRC. Next TSH planned for 11JAN2024, per 14DEC2023 treating Endocrinologist telephone encounter.   Iron studies ordered by Dr. Villanueva to be collected at next lab draw to assess iron levels r/t decreased MCV, MCH levels, which were not listed as AEs, no CTCAE grade found.  Dr. Villanueva deemed the decreased levels as unlikely related to protocol therapy and not CS.    Endocrinology following TSH for patient's baseline Grade II Hypothyroidism (resulted from treatment for Hyperthyroidism). See communication from study chair regarding TSH monitoring for this patient in Cycle 1 section of shadow chart. Additional source documentation in Cycle 4 Day 1 shadow chart and linked to 16NOV2023 CRC note.     ConMed list: reviewed with the patient for accuracy- see shadow chart.      Patient admits to being compliant with QD dosing of encorafenib and the use of the protocol's  pill diary.   Patient reports she is also compliant with adjusted dose of Synthroid (350 mcg) two 175 mcg tablets Daily since 14DEC2023.The patient knows to return remaining encorafenib and protocol pill diary at next appointment.  The patient verbalized understanding to take Encorafenib #4 capsule with water in the morning at the same time each day. Pt. also agreed to taking before leaving home on the days of infusional therapy to provide one hour time frame before receiving oral pre-medications.  Pt. instructed to notify this CRC if she does not receive communication for next Encorafenib refill from [facility] by second week in January 2024.  Baseline AEs:  See shadow chart for full list of Baseline AEs.     Baseline Hypothyroidism- Grade 2 (Start date 2012-continues) per documentation by Dr. Moss, the hypothyroidism resulted from treatment for hyperthyroidism. 12/13/23 TSH WNL.  Dr. Hansa Villanueva aware of Synthroid dosing changes (350 mcg QD) for Grade 2 BASELINE Hypothyroidism; does not deem related to protocol treatment, nor CS, and no new orders given. This was also reviewed with Dr. Villanueva.   Reviewed Section 8.3.1 Nivolumab dose mods- The Grade II Hypothyroidism present at Baseline, has not increased in grade, and the patient is asymptomatic per Dr. Villanueva exam. The patient denies any persistent headaches or visual changes.   See source doc communications concerning elevated 03NOV2023 TSH with WNL Free T4 with Dr. Arechiga, Study Chair, in Cycle 3 Day 15 section and Cycle 4 Day 1.      Baseline Fatigue- Grade 1- continues- pt reports being able to perform all ADLS. The treating MD is aware, does not deem as CS, no new orders given and continue to monitor.    Baseline Hypercalcemia-Grade 1 - continues. Corrected calcium calculation attached= 10.68 mg/dL. Endocrinologist monitoring as related to the patients BASELINE Hyperparathyroidism. Per Dr. Moss's office, no treatment or intervention  currently. Dr. Villanueva is aware does not deem CS, no change in Grade, no new orders, will continue to monitor.     Baseline Hyperparathyroidism- Grade 2 Continues (Start date 2021) The treating MD is aware, does not deem CS, no new orders given. Patient seen by endocrine on22NOV2023 & discussed with the treating MD; current calcium levels were not deemed CS, no new orders, will continue to monitor.      AEs:   See shadow chart for full list of AEs.     The patient specifically denies any nausea, diarrhea, or abdominal pain.     The patient denies experiencing any infusion related reaction including chills/rigors, nausea, vomiting, or headache during or after Cycle 45 Day 1.   Per protocol Section 7 / 7.1 & Table 3 The treating MD added hydrocortisone 50mg IV to Acetaminophen and Diphenhydramine premeds and increased infusion time of Cetuximab to four hours followed by a period of observation for previous cycles, beginning with Cycle 1 Day 15. Per the treating MD additional pre-meds and infusion extension will continue for the patient's future infusion appts.      Rash-Maculo-papular Grade 1 Start date 8/28/2023- continues. No worsening of rash noted by the treating MD with today's examination. Pt. confirms continued use of clindamycin gel and doxycycline as prescribed for sporadic areas of pruritic rash. Mild soaps, temped water, and moisturizers recommended. Erbitux skin care kit provided to patient as packaged from [employer]. The treating MD does not deem CS, and no new orders given.     Insomnia- Grade 2 (Start date 9/12/2023-continues) The treating MD deems as unlikely d/t to protocol therapy, and does not deem as CS, no new orders given. The patient denies taking any rx or OTC medication for sleep. The patient was reminded to avoid Trazodone while on Encorafenib. Endocrinologist is planning for the patient undergo a sleep study to evaluate the patient's snoring. (30OCT2023 Dr. Moss note).      Vitamin D, 25 Hydroxy  ordered per endocrinologist; reported less than normal limits-endocrine following; No CTCAE grading found. The treating MD does not relate to therapy, does not deem as CS, no new orders given. Ergocalciferol prescribed and patient admits to starting medication on 31OCT2023.     Cystitis noninfective- Grade 1 (Start 12/7/2023) Pt reports continuation of polyuria and pain on urination, but to a lesser degree than when started. Pt also reports urine in light in color, and less odor. The patient denies taking any treatment, but states she is considering starting OTC AZO. Per [employer]rom[employer], no noted drug interactions with protocol therapy. Dr. Villanueva does not deem as CS,  nor related to therapy, no additional orders given.     Hypoalbuminemia-Grade 1 (Start 12/29/2023) Patient denies any problems with consuming food or GI issues. States reports that due to holiday traveling that she may not have eaten as nutritionally well, but she intends focus on increased intake of protein and other recommendations by [facility] [[email or web address]] dietician. Dr. Villanueva is aware, does not deem related to protocol tx, nor CS. No new orders given will continues to monitor.     29DEC2023 Fasting glucose = 113mg/dL ( mg/dL). Intermittently & minimally elevated. Last glucose level WNL. Not listed as AE dt the intermittent nature. Dr. Villanueva does not relate to protocol therapy, nor CS. No new orders given.     /73 Pulse 98 Temp 97.4 °F   Wt. 129.5 kg (285 lb 7.9 oz SpO2 96%   BSA 2.46 m² Pain Sc 0-No pain    Zubrod PS: Zero     04DEC2023 CT scan: Reviewed by treating MD and Recist calculation of target lesions determined stable disease. However, the treating MD concerned for disease progression related to the 04DEC2023 report of new scattered soft tissue nodules along the left pericolic gutter, with the largest measuring 1.2 x 0.7 cm (series 102, image 170), which may reflect enlarged lymph nodes or possible tumoral deposits. Per protocol section 10.2.2  and tumor board recommendations, the treating MD ordered a PET scan to complement the CT finding of new scattered soft tissue nodules along the left pericolic gutter. See addended report of [id number] PET Scan.     After the review of the following protocol sections and today's examination of the patient, the treating MD determined the patient as clinically benefiting from treatment with pseudo progression of disease and determined that the patient should proceed with Cycle 5 Day 1 of treatment with a repeat CT scan at 4 weeks/prior to cycle 6 per Section 10.8.     Protocol sections reviewed:   7.7 The treating MD deems that the patient is clinically benefitting from treatment.   7.9 Treatment beyond progression (Arm 1 only) A minority of participants treated with immunotherapy (nivolumab) may derive clinical benefit, such as in delayed responses, stable disease, or increased overall survival, despite initial evidence of tumor enlargement. These participants may be permitted to continue treatment beyond initial RECIST 1.1- defined PD that occurs during the initial 16 weeks of treatment, (the [id number] scan falls within the initial 16 weeks of treatment) if they continue to clinically benefit in the opinion of the treating investigator and are not exposed to unreasonable risk.   Participants must have the following:   Absence of symptoms and signs indicating clinically significant progressive disease.             No decline in Zubrod performance status.   Absence of rapid disease progression or threat to vital organs or critical anatomical sites [e.g., CNS metastasis, respiratory failure due to tumor compression, spinal cord compression] requiring urgent alternative medical intervention.  10.8 irRC Progression (Arm 1 only) irRC progression is defined by progression as outlined in Section 10.2d except that progression determined by appearance of new lesions or by a 20% increase in the sum of diameters must be  confirmed by a second consecutive determination of progression at least 28 days from the date of initial documentation of progression.     Dr. Villanueva assessed all labs, VS & any PE findings as either WNL or NCS. At the discretion of the treating MD, the patient's current cardiac function does not require evaluation by ECG.   After review of all labs and PE, Dr. Villanueva deemed the patient eligible to receive C 5 D 15 of protocol treatment today.     Treatment Dosing for C 5 D 15:  For the patient's safety, the treating MD plans to continue following the protocol's recommendations of adding Hydrocortisone to other premeds and extending the infusion time of the Cetuximab to prevent delayed infusion reaction.  Infusion time extended per protocol Cetuximab-Related Infusion Reactions (Table 3).  Hydrocortisone 50mg IV added to premedication of Acetaminophen and Diphenhydramine. Protocol Section 7.0 / 7.1     Time out for dosing of protocol treatment completed with Dr. Villanueva in the exam room. Noting that C5D15 treatment plan orders were reviewed and signed by Dr. Santo at [id number] visit.      Since pt's wt. has not changed by 10% from original dosing, no dosing changes required.  Baseline BSA 2.46 m2 based on Screening: Ht. 5'6 and 287.26 lbs. (130.3 kg)      Cetuximab 500 mg/m2 x 2.46 m2 BSA = 1230 mg over 4 hours- Rounded dose via pharmacy policy equals 1200 mg dose.  Encorafenib 75mg tabs: 300mg po days 1-28.   Nivolumab Flat dose of 480mg-Per protocol, GIVEN on DAY 1 ONLY.     This CRC reviewed upcoming appointments with the patient and encouraged to call with any new symptoms or issues, the patient verbalized understanding. Patient made aware to call after hour number, that she agrees to having, if after business hours or over the weekend. The patient is aware of all return appointments listed below. The patient denied having any additional questions and ambulated out of the clinic to the elevators for the 3rd-floor  infusion cente without noted distress.     Next protocol required bio-specimen (Whole Blood) due at progression of disease. (Section 9.0 Study Calendar)     Per protocol section 10.8 and MD order; Repeat CT scan scheduled on Thursday, January 4th, 2024.     Next Every 8-week CT C/A/P (Section 7.5): Due January 29th, 2024- Not scheduled- pending results from 04JAN2024 scan.     Cycle 6 Day 1:     11JAN2024 Labs @ 12:30  11JAN2024 OV with Dr. Santo @ 14:00  12Jan2024 C6 Day 1 @ 0830    See source doc from Study chair dated 8/31/2023 (in patient shadow chart) allowing 3-day window of toxicity assessments.     CHANDRIKA Adams RN

## 2023-12-29 NOTE — PROGRESS NOTES
JULIANNE met with pt prior to her infusion treatment. Pt voiced she is tired after retuning from out of town early this AM. She spent some time with hr daughters future in laws. She had a nice trip. She will getting treatment today. SW encouraged her to catch up on her rest while here.   SW provided pt with a gas card. No further needs noted today.    Radha Giordano, JENNIEW

## 2023-12-29 NOTE — PROGRESS NOTES
[facility]  FOLLOW-UP VISIT.     Reason for visit: Follow Up     Best Contact Phone Number(s): [phone] (home)      Cancer/Stage/TNM:    Cancer Staging   Malignant neoplasm of sigmoid colon  Staging form: Colon and Rectum, AJCC 8th Edition  - Clinical stage from 3/31/2020: Stage IIIC (cT4b, cN2a, cM0) - Signed by Silvio Man MD on 3/31/2020  - Clinical stage from 5/3/2021: Stage Unknown (rcT0, cNX, cM1) - Signed by Marah Santo MD on 8/16/2023       Oncology History   Malignant neoplasm of sigmoid colon   3/16/2020 Initial Diagnosis    Malignant neoplasm of sigmoid colon     3/31/2020 Cancer Staged    Staging form: Colon and Rectum, AJCC 8th Edition  - Clinical stage from 3/31/2020: Stage IIIC (cT4b, cN2a, cM0)     5/6/2020 - 11/17/2020 Chemotherapy    Treatment Summary   Plan Name: OP FOLFOX 6 Q2W  Treatment Goal: Curative  Status: Inactive  Start Date: 5/6/2020  End Date: 11/6/2020  Provider: Dylan Leyva MD  Chemotherapy: fluorouraciL injection 945 mg, 400 mg/m2 = 945 mg, Intravenous, Clinic/HOD 1 time, 14 of 14 cycles  Administration: 945 mg (5/6/2020), 945 mg (5/20/2020), 945 mg (6/3/2020), 945 mg (6/17/2020), 945 mg (7/29/2020), 945 mg (8/12/2020), 945 mg (8/26/2020), 945 mg (9/9/2020), 945 mg (9/23/2020), 945 mg (10/6/2020), 945 mg (10/21/2020), 945 mg (11/4/2020)  fluorouraciL 2,400 mg/m2 = 5,665 mg in sodium chloride 0.9% 240 mL chemo infusion, 2,400 mg/m2 = 5,665 mg, Intravenous, Over 46 hours, 14 of 14 cycles  Administration: 5,665 mg (5/6/2020), 5,665 mg (5/20/2020), 5,665 mg (6/3/2020), 5,665 mg (6/17/2020), 5,665 mg (7/29/2020), 5,665 mg (8/12/2020), 5,665 mg (8/26/2020), 5,665 mg (9/9/2020), 5,665 mg (9/23/2020), 5,665 mg (10/6/2020), 5,665 mg (10/21/2020), 5,665 mg (11/4/2020)  leucovorin calcium 900 mg in dextrose 5 % 250 mL infusion, 945 mg, Intravenous, Clinic/\[facility]\"" 1 time, 14 of 14 cycles  Administration: 900 mg (5/6/2020), 900 mg (5/20/2020), 900 mg  (6/3/2020), 900 mg (6/17/2020), 945 mg (6/30/2020), 945 mg (7/20/2020), 945 mg (7/29/2020), 945 mg (8/12/2020), 945 mg (8/26/2020), 945 mg (9/9/2020), 945 mg (9/23/2020), 945 mg (10/6/2020), 945 mg (10/21/2020), 945 mg (11/4/2020)  oxaliplatin (ELOXATIN) 200 mg in dextrose 5 % 500 mL chemo infusion, 201 mg, Intravenous, Clinic/HOD 1 time, 6 of 6 cycles  Dose modification: 65 mg/m2 (original dose 85 mg/m2, Cycle 6)  Administration: 200 mg (5/6/2020), 200 mg (5/20/2020), 200 mg (6/3/2020), 200 mg (6/17/2020), 201 mg (6/30/2020), 150 mg (7/20/2020)     5/3/2021 Cancer Staged    Staging form: Colon and Rectum, AJCC 8th Edition  - Clinical stage from 5/3/2021: Stage Unknown (rcT0, cNX, cM1)     6/16/2021 - 8/2/2023 Chemotherapy    Treatment Summary   Plan Name: OP COLORECTAL FOLFIRI + BEVACIZUMAB Q2W  Treatment Goal: Control  Status: Inactive  Start Date: 6/16/2021  End Date: 8/2/2023  Provider: Marah Santo MD  Chemotherapy: fluorouraciL injection 990 mg, 400 mg/m2 = 990 mg, Intravenous, Clinic/HOD 1 time, 15 of 15 cycles  Administration: 990 mg (6/16/2021), 990 mg (6/30/2021), 990 mg (7/14/2021), 980 mg (7/28/2021), 990 mg (8/11/2021), 990 mg (8/25/2021), 990 mg (9/8/2021), 990 mg (9/22/2021), 990 mg (10/6/2021), 990 mg (10/20/2021), 990 mg (11/3/2021), 990 mg (12/1/2021), 990 mg (12/15/2021), 990 mg (11/17/2021), 990 mg (12/28/2021)  fluorouraciL 2,400 mg/m2 = 5,950 mg in sodium chloride 0.9% 240 mL chemo infusion, 2,400 mg/m2 = 5,950 mg, Intravenous, Over 46 hours, 43 of 46 cycles  Administration: 5,950 mg (6/16/2021), 5,950 mg (6/30/2021), 5,950 mg (7/14/2021), 5,880 mg (7/28/2021), 5,930 mg (8/11/2021), 5,930 mg (8/25/2021), 5,930 mg (9/8/2021), 5,930 mg (9/22/2021), 5,930 mg (10/6/2021), 5,930 mg (10/20/2021), 5,930 mg (11/3/2021), 5,930 mg (12/1/2021), 5,930 mg (12/15/2021), 5,930 mg (11/17/2021), 5,930 mg (12/28/2021), 6,050 mg (5/11/2022), 6,025 mg (3/9/2022), 6,025 mg (2/23/2022), 5,930 mg (2/2/2022),  5,930 mg (1/19/2022), 6,050 mg (4/20/2022), 6,025 mg (4/6/2022), 6,025 mg (3/23/2022), 6,050 mg (6/1/2022), 6,050 mg (6/15/2022), 6,050 mg (10/19/2022), 6,050 mg (10/5/2022), 6,050 mg (11/2/2022), 6,050 mg (11/16/2022), 6,050 mg (11/30/2022), 6,050 mg (1/4/2023), 6,050 mg (12/19/2022), 6,050 mg (1/18/2023), 6,000 mg (5/22/2023), 6,000 mg (4/26/2023), 6,000 mg (4/11/2023), 6,000 mg (2/28/2023), 6,050 mg (2/14/2023), 6,000 mg (2/1/2023), 6,000 mg (7/5/2023), 6,000 mg (6/19/2023), 6,000 mg (6/5/2023), 6,000 mg (7/31/2023)  bevacizumab (AVASTIN) 600 mg in sodium chloride 0.9% 100 mL chemo infusion, 660 mg, Intravenous, [place] [id number] time, 36 of 36 cycles  Dose modification: 5 mg/kg (original dose 5 mg/kg, Cycle 26, Reason: MD Discretion, Comment: 5 mg/kg original dose)  Administration: 600 mg (6/30/2021), 600 mg (7/14/2021), 600 mg (7/28/2021), 600 mg (6/16/2021), 600 mg (8/11/2021), 655 mg (8/25/2021), 600 mg (9/8/2021), 600 mg (9/22/2021), 600 mg (10/6/2021), 600 mg (10/20/2021), 600 mg (11/3/2021), 655 mg (12/1/2021), 600 mg (12/15/2021), 600 mg (11/17/2021), 600 mg (12/28/2021), 600 mg (5/11/2022), 600 mg (3/9/2022), 600 mg (2/23/2022), 600 mg (2/2/2022), 600 mg (1/19/2022), 600 mg (4/20/2022), 680 mg (4/6/2022), 600 mg (3/23/2022), 680 mg (10/5/2022), 680 mg (10/19/2022), 680 mg (11/2/2022), 680 mg (11/16/2022), 680 mg (11/30/2022), 680 mg (1/4/2023), 680 mg (12/19/2022), 680 mg (1/18/2023), 680 mg (3/28/2023), 680 mg (3/14/2023), 680 mg (2/28/2023), 680 mg (2/14/2023), 680 mg (2/1/2023)  irinotecan (CAMPTOSAR) 440 mg in sodium chloride 0.9% 500 mL chemo infusion, 446 mg, Intravenous, [facility] 1 time, 45 of 48 cycles  Dose modification: 180 mg/m2 (original dose 180 mg/m2, Cycle 24)  Administration: 440 mg (6/16/2021), 440 mg (6/30/2021), 440 mg (7/14/2021), 440 mg (7/28/2021), 440 mg (8/11/2021), 444 mg (8/25/2021), 440 mg (9/8/2021), 440 mg (9/22/2021), 440 mg (10/6/2021), 440 mg (10/20/2021), 440 mg (11/3/2021),  440 mg (12/1/2021), 440 mg (12/15/2021), 440 mg (11/17/2021), 440 mg (12/28/2021), 440 mg (5/11/2022), 440 mg (3/9/2022), 440 mg (2/23/2022), 440 mg (2/2/2022), 440 mg (1/19/2022), 440 mg (4/20/2022), 440 mg (4/6/2022), 440 mg (3/24/2022), 440 mg (6/1/2022), 440 mg (6/15/2022), 440 mg (10/19/2022), 440 mg (10/5/2022), 440 mg (11/2/2022), 440 mg (11/16/2022), 440 mg (11/30/2022), 440 mg (1/4/2023), 440 mg (12/19/2022), 440 mg (1/18/2023), 440 mg (5/22/2023), 440 mg (4/26/2023), 440 mg (4/11/2023), 440 mg (3/28/2023), 440 mg (3/14/2023), 440 mg (2/28/2023), 440 mg (2/14/2023), 440 mg (2/1/2023), 440 mg (7/5/2023), 440 mg (6/19/2023), 440 mg (6/5/2023), 440 mg (7/31/2023)  bevacizumab-awwb (MVASI) 5 mg/kg = 680 mg in sodium chloride 0.9% 100 mL infusion, 5 mg/kg = 680 mg (100 % of original dose 5 mg/kg), Intravenous, [facility] time, 7 of 10 cycles  Dose modification: 5 mg/kg (original dose 5 mg/kg, Cycle 39)  Administration: 680 mg (4/11/2023), 680 mg (4/26/2023), 680 mg (5/22/2023), 680 mg (7/5/2023), 680 mg (6/19/2023), 680 mg (6/5/2023), 680 mg (7/31/2023)     8/17/2023 - 8/18/2023 Chemotherapy    Treatment Summary   Plan Name: OP CETUXIMAB 500MG Q2W  Treatment Goal: Control  Status: Inactive  Start Date:   End Date:   Provider: Marah Santo MD  Chemotherapy: [No matching medication found in this treatment plan]     8/24/2023 -  Chemotherapy    Treatment Summary   Plan Name: [email or web address] - ARM 1 ENCORAFENIB  CETUXIMAB NIVOLUMAB  Treatment Goal: Palliative  Status: Active  Start Date: 8/24/2023  End Date: 7/12/2024 (Planned)  Provider: Marah Santo MD  Chemotherapy: cetuximab (ERBITUX) 100 mg/50 mL chemo infusion 1,200 mg, 1,230 mg, Intravenous, [facility] [place] [facility] 1 time, 5 of 12 cycles  Administration: 1,200 mg (8/24/2023), 1,200 mg (9/8/2023), 1,200 mg (9/22/2023), 1,200 mg (10/6/2023), 1,200 mg (10/20/2023), 1,200 mg (11/3/2023), 1,200 mg (11/17/2023), 1,200 mg (12/1/2023), 1,200 mg (12/15/2023)  encorafenib 75 mg  Cap, 300 mg, Oral, Daily, 1 of 1 cycle, Start date: --, End date: --     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   4/3/2020 Initial Diagnosis    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     5/6/2020 - 11/17/2020 Chemotherapy    Treatment Summary   Plan Name: OP FOLFOX 6 Q2W  Treatment Goal: Curative  Status: Inactive  Start Date: 5/6/2020  End Date: 11/6/2020  Provider: Dylan Leyva MD  Chemotherapy: fluorouraciL injection 945 mg, 400 mg/m2 = 945 mg, Intravenous, Clinic/H[facility] 1 time, 14 of 14 cycles  Administration: 945 mg (5/6/2020), 945 mg (5/20/2020), 945 mg (6/3/2020), 945 mg (6/17/2020), 945 mg (7/29/2020), 945 mg (8/12/2020), 945 mg (8/26/2020), 945 mg (9/9/2020), 945 mg (9/23/2020), 945 mg (10/6/2020), 945 mg (10/21/2020), 945 mg (11/4/2020)  fluorouraciL 2,400 mg/m2 = 5,665 mg in sodium chloride 0.9% 240 mL chemo infusion, 2,400 mg/m2 = 5,665 mg, Intravenous, Over 46 hours, 14 of 14 cycles  Administration: 5,665 mg (5/6/2020), 5,665 mg (5/20/2020), 5,665 mg (6/3/2020), 5,665 mg (6/17/2020), 5,665 mg (7/29/2020), 5,665 mg (8/12/2020), 5,665 mg (8/26/2020), 5,665 mg (9/9/2020), 5,665 mg (9/23/2020), 5,665 mg (10/6/2020), 5,665 mg (10/21/2020), 5,665 mg (11/4/2020)  leucovorin calcium 900 mg in dextrose 5 % 250 mL infusion, 945 mg, Intravenous, Clinic/HOD 1 time, 14 of 14 cycles  Administration: 900 mg (5/6/2020), 900 mg (5/20/2020), 900 mg (6/3/2020), 900 mg (6/17/2020), 945 mg (6/30/2020), 945 mg (7/20/2020), 945 mg (7/29/2020), 945 mg (8/12/2020), 945 mg (8/26/2020), 945 mg (9/9/2020), 945 mg (9/23/2020), 945 mg (10/6/2020), 945 mg (10/21/2020), 945 mg (11/4/2020)  oxaliplatin (ELOXATIN) 200 mg in dextrose 5 % 500 mL chemo infusion, 201 mg, Intravenous, Clinic/[facility] 1 time, 6 of 6 cycles  Dose modification: 65 mg/m2 (original dose 85 mg/m2, Cycle 6)  Administration: 200 mg (5/6/2020), 200 mg (5/20/2020), 200 mg (6/3/2020), 200 mg (6/17/2020), 201 mg (6/30/2020), 150 mg (7/20/2020)     6/16/2021 - 8/2/2023  Chemotherapy    Treatment Summary   Plan Name: OP COLORECTAL FOLFIRI + BEVACIZUMAB Q2W  Treatment Goal: Control  Status: Inactive  Start Date: 6/16/2021  End Date: 8/2/2023  Provider: Marah Santo MD  Chemotherapy: fluorouraciL injection 990 mg, 400 mg/m2 = 990 mg, Intravenous, Clinic/[facility] 1 time, 15 of 15 cycles  Administration: 990 mg (6/16/2021), 990 mg (6/30/2021), 990 mg (7/14/2021), 980 mg (7/28/2021), 990 mg (8/11/2021), 990 mg (8/25/2021), 990 mg (9/8/2021), 990 mg (9/22/2021), 990 mg (10/6/2021), 990 mg (10/20/2021), 990 mg (11/3/2021), 990 mg (12/1/2021), 990 mg (12/15/2021), 990 mg (11/17/2021), 990 mg (12/28/2021)  fluorouraciL 2,400 mg/m2 = 5,950 mg in sodium chloride 0.9% 240 mL chemo infusion, 2,400 mg/m2 = 5,950 mg, Intravenous, Over 46 hours, 43 of 46 cycles  Administration: 5,950 mg (6/16/2021), 5,950 mg (6/30/2021), 5,950 mg (7/14/2021), 5,880 mg (7/28/2021), 5,930 mg (8/11/2021), 5,930 mg (8/25/2021), 5,930 mg (9/8/2021), 5,930 mg (9/22/2021), 5,930 mg (10/6/2021), 5,930 mg (10/20/2021), 5,930 mg (11/3/2021), 5,930 mg (12/1/2021), 5,930 mg (12/15/2021), 5,930 mg (11/17/2021), 5,930 mg (12/28/2021), 6,050 mg (5/11/2022), 6,025 mg (3/9/2022), 6,025 mg (2/23/2022), 5,930 mg (2/2/2022), 5,930 mg (1/19/2022), 6,050 mg (4/20/2022), 6,025 mg (4/6/2022), 6,025 mg (3/23/2022), 6,050 mg (6/1/2022), 6,050 mg (6/15/2022), 6,050 mg (10/19/2022), 6,050 mg (10/5/2022), 6,050 mg (11/2/2022), 6,050 mg (11/16/2022), 6,050 mg (11/30/2022), 6,050 mg (1/4/2023), 6,050 mg (12/19/2022), 6,050 mg (1/18/2023), 6,000 mg (5/22/2023), 6,000 mg (4/26/2023), 6,000 mg (4/11/2023), 6,000 mg (2/28/2023), 6,050 mg (2/14/2023), 6,000 mg (2/1/2023), 6,000 mg (7/5/2023), 6,000 mg (6/19/2023), 6,000 mg (6/5/2023), 6,000 mg (7/31/2023)  bevacizumab (AVASTIN) 600 mg in sodium chloride 0.9% 100 mL chemo infusion, 660 mg, Intravenous, Clinic/[facility] 1 time, 36 of 36 cycles  Dose modification: 5 mg/kg (original dose 5 mg/kg, Cycle 26,  Reason: MD Discretion, Comment: 5 mg/kg original dose)  Administration: 600 mg (6/30/2021), 600 mg (7/14/2021), 600 mg (7/28/2021), 600 mg (6/16/2021), 600 mg (8/11/2021), 655 mg (8/25/2021), 600 mg (9/8/2021), 600 mg (9/22/2021), 600 mg (10/6/2021), 600 mg (10/20/2021), 600 mg (11/3/2021), 655 mg (12/1/2021), 600 mg (12/15/2021), 600 mg (11/17/2021), 600 mg (12/28/2021), 600 mg (5/11/2022), 600 mg (3/9/2022), 600 mg (2/23/2022), 600 mg (2/2/2022), 600 mg (1/19/2022), 600 mg (4/20/2022), 680 mg (4/6/2022), 600 mg (3/23/2022), 680 mg (10/5/2022), 680 mg (10/19/2022), 680 mg (11/2/2022), 680 mg (11/16/2022), 680 mg (11/30/2022), 680 mg (1/4/2023), 680 mg (12/19/2022), 680 mg (1/18/2023), 680 mg (3/28/2023), 680 mg (3/14/2023), 680 mg (2/28/2023), 680 mg (2/14/2023), 680 mg (2/1/2023)  irinotecan (CAMPTOSAR) 440 mg in sodium chloride 0.9% 500 mL chemo infusion, 446 mg, Intravenous, Clinic/[facility] 1 time, 45 of 48 cycles  Dose modification: 180 mg/m2 (original dose 180 mg/m2, Cycle 24)  Administration: 440 mg (6/16/2021), 440 mg (6/30/2021), 440 mg (7/14/2021), 440 mg (7/28/2021), 440 mg (8/11/2021), 444 mg (8/25/2021), 440 mg (9/8/2021), 440 mg (9/22/2021), 440 mg (10/6/2021), 440 mg (10/20/2021), 440 mg (11/3/2021), 440 mg (12/1/2021), 440 mg (12/15/2021), 440 mg (11/17/2021), 440 mg (12/28/2021), 440 mg (5/11/2022), 440 mg (3/9/2022), 440 mg (2/23/2022), 440 mg (2/2/2022), 440 mg (1/19/2022), 440 mg (4/20/2022), 440 mg (4/6/2022), 440 mg (3/24/2022), 440 mg (6/1/2022), 440 mg (6/15/2022), 440 mg (10/19/2022), 440 mg (10/5/2022), 440 mg (11/2/2022), 440 mg (11/16/2022), 440 mg (11/30/2022), 440 mg (1/4/2023), 440 mg (12/19/2022), 440 mg (1/18/2023), 440 mg (5/22/2023), 440 mg (4/26/2023), 440 mg (4/11/2023), 440 mg (3/28/2023), 440 mg (3/14/2023), 440 mg (2/28/2023), 440 mg (2/14/2023), 440 mg (2/1/2023), 440 mg (7/5/2023), 440 mg (6/19/2023), 440 mg (6/5/2023), 440 mg (7/31/2023)  bevacizumab-awwb (MVASI) 5 mg/kg = 680  mg in sodium chloride 0.9% 100 mL infusion, 5 mg/kg = 680 mg (100 % of original dose 5 mg/kg), Intravenous, Clinic/[facility] 1 time, 7 of 10 cycles  Dose modification: 5 mg/kg (original dose 5 mg/kg, Cycle 39)  Administration: 680 mg (4/11/2023), 680 mg (4/26/2023), 680 mg (5/22/2023), 680 mg (7/5/2023), 680 mg (6/19/2023), 680 mg (6/5/2023), 680 mg (7/31/2023)     8/17/2023 - 8/18/2023 Chemotherapy    Treatment Summary   Plan Name: OP CETUXIMAB 500MG Q2W  Treatment Goal: Control  Status: Inactive  Start Date:   End Date:   Provider: Marah Santo MD  Chemotherapy: [No matching medication found in this treatment plan]     8/24/2023 -  Chemotherapy    Treatment Summary   Plan Name: [place] [id number]- ARM 1 ENCORAFENIB  CETUXIMAB NIVOLUMAB  Treatment Goal: Palliative  Status: Active  Start Date: 8/24/2023  End Date: 7/12/2024 (Planned)  Provider: Marah Santo MD  Chemotherapy: cetuximab (ERBITUX) 100 mg/50 mL chemo infusion 1,200 mg, 1,230 mg, Intravenous, Clinic/[facility] 1 time, 5 of 12 cycles  Administration: 1,200 mg (8/24/2023), 1,200 mg (9/8/2023), 1,200 mg (9/22/2023), 1,200 mg (10/6/2023), 1,200 mg (10/20/2023), 1,200 mg (11/3/2023), 1,200 mg (11/17/2023), 1,200 mg (12/1/2023), 1,200 mg (12/15/2023)  encorafenib 75 mg Cap, 300 mg, Oral, Daily, 1 of 1 cycle, Start date: --, End date: --          Interval History: Gail Mckinnon is a 53 y.o. female with metastatic colorectal cancer who presents for Cycle 5D15 of [facility] . Patient recently returned from [place] for the holidays. Overall patient is feeling well, notes occasional insomnia. Facial rash is stable.     Patient presents to clinic alone.  ECOG Performance Status is 0.    ROS:  A complete 12-point review of systems was reviewed and is negative except as mentioned above.     Past Medical History:   Past Medical History:   Diagnosis Date    Anxiety     Depression     FH: ovarian cancer 3/16/2020    Hx of psychiatric care     Effexor, Paxil, Lexapro, Zoloft,  Wellbutrin, Trazodone Buspar    Hyperthyroidism     Hypothyroid     Kidney calculi     Malignant neoplasm of sigmoid colon 3/16/2020    Menorrhagia     Multinodular goiter 8/24/2012    Palpitation     Psychiatric problem     Venous insufficiency         Allergies:   Review of patient's allergies indicates:   Allergen Reactions    Oxaliplatin Other (See Comments)     Itchy hands, throat closed up, trouble hearing - during infusion    Penicillins Hives and Rash     childhood allergy  childhood allergy  unknown        Medications:   Current Outpatient Medications   Medication Sig Dispense Refill    acetaminophen (TYLENOL) 500 MG tablet Take 2 tablets (1,000 mg total) by mouth every 8 (eight) hours. 60 tablet 0    buPROPion (WELLBUTRIN SR) 150 MG TBSR 12 hr tablet Take 1 tablet (150 mg total) by mouth 2 (two) times daily. 180 tablet 0    clindamycin phosphate 1% (CLINDAGEL) 1 % gel Apply topically 2 (two) times daily. 60 g 3    doxycycline (VIBRA-TABS) 100 MG tablet Take 1 tablet (100 mg total) by mouth 2 (two) times daily. 60 tablet 3    DULoxetine (CYMBALTA) 30 MG capsule Take 2 capsules (60 mg total) by mouth once daily. 60 capsule 0    encorafenib 75 mg Cap Take 4 capsules (300 mg total) by mouth once daily. 120 capsule 0    ergocalciferol (ERGOCALCIFEROL) 50,000 unit Cap Take 1 capsule (50,000 Units total) by mouth every 7 days. 4 capsule 2    ibuprofen (ADVIL,MOTRIN) 600 MG tablet Take 1 tablet (600 mg total) by mouth every 8 (eight) hours as needed for Pain. 30 tablet 0    Lactobacillus rhamnosus GG (CULTURELLE) 10 billion cell capsule Take 1 capsule by mouth once daily.      levothyroxine (SYNTHROID) 175 MCG tablet Take 2 tablets (350 mcg total) by mouth before breakfast. 60 tablet 11    LIDOcaine-prilocaine (EMLA) cream Apply topically as needed (30 to 60 min prior chemo or port use). 30 g 3    LORazepam (ATIVAN) 1 MG tablet Take 1 tablet (1 mg total) by mouth every 12 (twelve) hours as needed for Anxiety  "(nausea). 30 tablet 0    magic mouthwash diphen/antac/lidoc/nysta Swish and swallow 5 mL every 4 hours as needed for mouth pain/ulcers 120 mL 2    multivitamin (THERAGRAN) per tablet Take 1 tablet by mouth once daily.      olmesartan (BENICAR) 20 MG tablet Take 1 tablet (20 mg total) by mouth once daily. 30 tablet 5    ondansetron (ZOFRAN-ODT) 8 MG TbDL Take 1 tablet (8 mg total) by mouth every 8 (eight) hours as needed. 60 tablet 3    oxyCODONE-acetaminophen (PERCOCET)  mg per tablet Take 1 tablet by mouth every 4 (four) hours as needed for Pain. 180 tablet 0    prochlorperazine (COMPAZINE) 10 MG tablet Take 1 tablet (10 mg total) by mouth every 6 (six) hours as needed. 30 tablet 6    senna (SENOKOT) 8.6 mg tablet Take 2 tablets by mouth once daily. 60 tablet 2    senna-docusate 8.6-50 mg (PERICOLACE) 8.6-50 mg per tablet Take 2 tablets by mouth 2 (two) times daily.      granisetron (SANCUSO) 3.1 mg/24 hour Place 1 patch (3.1 mg total) onto the skin every 7 days AS DIRECTED. (Patient not taking: Reported on 12/29/2023) 4 patch 3    mirtazapine (REMERON) 7.5 MG Tab Take 1 tablet (7.5 mg total) by mouth every evening. 30 tablet 0     No current facility-administered medications for this visit.        Physical Exam:   /73   Pulse 98   Temp 97.4 °F (36.3 °C) (Temporal)   Resp (!) 21   Ht 5' 6" (1.676 m)   Wt 129.5 kg (285 lb 7.9 oz)   LMP 01/22/2020   SpO2 96%   BMI 46.08 kg/m²      Physical Exam  Constitutional:       Appearance: Normal appearance.   HENT:      Head: Normocephalic and atraumatic.      Mouth/Throat:      Mouth: Mucous membranes are moist.   Eyes:      Extraocular Movements: Extraocular movements intact.      Pupils: Pupils are equal, round, and reactive to light.   Cardiovascular:      Rate and Rhythm: Normal rate and regular rhythm.      Pulses: Normal pulses.      Heart sounds: No murmur heard.  Pulmonary:      Effort: Pulmonary effort is normal. No respiratory distress.      " Breath sounds: Normal breath sounds.   Abdominal:      General: Abdomen is flat. There is no distension.      Palpations: Abdomen is soft.      Tenderness: There is no abdominal tenderness.   Musculoskeletal:         General: No swelling or tenderness. Normal range of motion.      Cervical back: Normal range of motion. No rigidity.   Skin:     General: Skin is warm and dry.      Coloration: Skin is not jaundiced.      Comments: Mild acneiform rash cheeks    Neurological:      General: No focal deficit present.      Mental Status: She is alert and oriented to person, place, and time.   Psychiatric:         Mood and Affect: Mood normal.         Behavior: Behavior normal.           Labs:   Lab Results   Component Value Date    WBC 6.15 12/29/2023    HGB 13.2 12/29/2023    HCT 42.2 12/29/2023    MCV 79 (L) 12/29/2023     12/29/2023       Lab Results   Component Value Date     12/29/2023    K 3.9 12/29/2023     12/29/2023    CO2 21 (L) 12/29/2023    BUN 13 12/29/2023    CREATININE 0.8 12/29/2023    ALBUMIN 3.4 (L) 12/29/2023    BILITOT 0.4 12/29/2023    ALKPHOS 135 12/29/2023    AST 14 12/29/2023    ALT 15 12/29/2023       Imaging:   NM PET CT FDG Skull Base to Mid Thigh  Addendum: Comparison is made with prior CT of the chest abdomen and pelvis dated  12/04/2023 and 10/09/2023.     The scattered multifocal nodular implants throughout the mesentery have  not significantly changed from the recent CT dated 12/04/2023, as well as  the more remote study dated 10/09/2023.  The nodular implants along the  left pericolic gutter are unchanged from a the most recent CT, however  have enlarged from the exam dated 10/09/2023.  A representative nodule in  the left hemipelvis best visualized on image 234 measures 2.4 by 1.4 cm  compared with 1.7 x 0.8 cm on the 10/09/2023 exam.  There is a nodule in  the left pericolic gutter best visualized on image 193 which measures 11 x  9 mm which is unchanged from the more  recent exam, however is new from the  study dated 10/09/2023.     Electronically signed by: Hugo Copeland MD   Date:    12/14/2023   Time:    14:16  Narrative: EXAMINATION:  NM PET CT FDG SKULL BASE TO MID THIGH    CLINICAL HISTORY:  Colon cancer, stage IV, monitor; Malignant neoplasm of sigmoid colon    TECHNIQUE:  10.8 mCi of F18-FDG was administered intravenously in the left antecubital vein.  After an approximately 60 min distribution time, PET/CT images were acquired from the skull base to the mid thigh. Transmission images were acquired to correct for attenuation using a whole body low-dose CT scan without contrast with the arms positioned above the head.    COMPARISON:  PET-CT 12/08/2022.    FINDINGS:  Head and neck: There is symmetric and physiologic distribution of radiotracer throughout the included brain.  There is no hemorrhage, hydrocephalus, or midline shift.  There is no FDG avid cervical lymphadenopathy.  There is an unchanged FDG avid left thyroid nodule.  There is a right IJ port in place.    Chest: There are extensive new hypermetabolic paratracheal, prevascular, bilateral hilar, and subcarinal lymph nodes.  A representative right paratracheal lymph node which is new from the prior exam measures 2.0 x 1.3 cm with an SUV max of 15.  This is best visualized on image 86.    There is no FDG avid pulmonary nodule or mass.  There is no pleural effusion.    Abdomen and pelvis: There is physiologic radiotracer throughout the liver, spleen, and collecting system.  There are multiple new and enlarging hypermetabolic nodes within the mesenteric root.  A representative new node best visualized on image 188 measures 2.4 x 1.5 cm with an SUV max of 8.1 the metastatic pelvic adenopathy has progressed.  A new toni conglomerate in the left hemipelvis best visualized on image 245 measures 4.1 x 1.7 cm with an SUV max of 8.9.    Musculoskeletal: There is diffuse hypermetabolism throughout the osseous structures  without visualized lesion most consistent with marrow stimulation.  Impression: Progression of disease with multiple new and enlarging lymph nodes throughout the pelvis and mesentery along with new metastatic FDG avid mediastinal adenopathy as above.    Electronically signed by: Hugo Copeland MD  Date:    12/13/2023  Time:    15:21            Diagnoses:       1. Malignant neoplasm of sigmoid colon    2. 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 Secondary adenocarcinoma of lymph node    4. Secondary malignant neoplasm of retroperitoneum          Assessment and Plan:     #  Metastatic Colorectal Cancer - Patient presented with Stage III disease treated with adjuvant FOLFOX. She subsequently had LN recurrence. Treated with FOLFIRI + Avastin -> SWOG 2107 Encorafenib / Cetuximab / Nivolumab due to progression of disease. Most recent PET scan shows multiple new + enlarging lymph nodes throughout the pelvis + mediastinal adenopathy, however does not meet RECIST criteria. Plan for repeat CT CAP in 1w.   - tx signed for today, proceed with C5D15    # Rash - Takes doxycyline & clindamycin, stable     # Insomnia - Established with palliative, Remeron qhs      she will return to clinic as scheduled, but knows to call in the interim if symptoms change or should a problem arise.    Alma Villanueva MD  Hematology/Oncology  [facility]    Med Onc Route Chart for Scheduling

## 2023-12-29 NOTE — PLAN OF CARE
Problem: Adult Inpatient Plan of Care  Goal: Patient-Specific Goal (Individualized)  Outcome: Ongoing, Progressing  Flowsheets (Taken 12/29/2023 0925)  Anxieties, Fears or Concerns: none  Individualized Care Needs: recliner, warm blanket, dim lights     Problem: Fatigue  Goal: Improved Activity Tolerance  Intervention: Promote Improved Energy  Flowsheets (Taken 12/29/2023 0925)  Fatigue Management: paced activity encouraged  Sleep/Rest Enhancement:   noise level reduced   room darkened  Activity Management:   Ambulated -L4   Ambulated to bathroom - L4   Ambulated in reyes - L4

## 2024-01-01 ENCOUNTER — PATIENT MESSAGE (OUTPATIENT)
Dept: HEMATOLOGY/ONCOLOGY | Facility: CLINIC | Age: 54
End: 2024-01-01
Payer: MEDICAID

## 2024-01-01 ENCOUNTER — OFFICE VISIT (OUTPATIENT)
Dept: HEMATOLOGY/ONCOLOGY | Facility: CLINIC | Age: 54
DRG: 947 | End: 2024-01-01
Payer: MEDICAID

## 2024-01-01 ENCOUNTER — DOCUMENTATION ONLY (OUTPATIENT)
Dept: INFUSION THERAPY | Facility: HOSPITAL | Age: 54
End: 2024-01-01

## 2024-01-01 ENCOUNTER — TELEPHONE (OUTPATIENT)
Dept: INFUSION THERAPY | Facility: HOSPITAL | Age: 54
End: 2024-01-01
Payer: MEDICAID

## 2024-01-01 ENCOUNTER — TELEPHONE (OUTPATIENT)
Dept: HEMATOLOGY/ONCOLOGY | Facility: CLINIC | Age: 54
End: 2024-01-01
Payer: MEDICAID

## 2024-01-01 ENCOUNTER — LAB VISIT (OUTPATIENT)
Dept: LAB | Facility: HOSPITAL | Age: 54
End: 2024-01-01
Payer: MEDICAID

## 2024-01-01 ENCOUNTER — NURSE TRIAGE (OUTPATIENT)
Dept: ADMINISTRATIVE | Facility: CLINIC | Age: 54
End: 2024-01-01
Payer: MEDICAID

## 2024-01-01 ENCOUNTER — ANESTHESIA EVENT (OUTPATIENT)
Dept: SURGERY | Facility: HOSPITAL | Age: 54
DRG: 947 | End: 2024-01-01
Payer: MEDICAID

## 2024-01-01 ENCOUNTER — RESEARCH ENCOUNTER (OUTPATIENT)
Dept: RESEARCH | Facility: HOSPITAL | Age: 54
End: 2024-01-01
Payer: MEDICAID

## 2024-01-01 ENCOUNTER — PATIENT MESSAGE (OUTPATIENT)
Dept: PSYCHIATRY | Facility: CLINIC | Age: 54
End: 2024-01-01

## 2024-01-01 ENCOUNTER — TELEPHONE (OUTPATIENT)
Dept: ENDOCRINOLOGY | Facility: CLINIC | Age: 54
End: 2024-01-01
Payer: MEDICAID

## 2024-01-01 ENCOUNTER — PATIENT MESSAGE (OUTPATIENT)
Dept: REHABILITATION | Facility: HOSPITAL | Age: 54
End: 2024-01-01
Payer: MEDICAID

## 2024-01-01 ENCOUNTER — TUMOR BOARD CONFERENCE (OUTPATIENT)
Dept: RESEARCH | Facility: HOSPITAL | Age: 54
End: 2024-01-01
Payer: MEDICAID

## 2024-01-01 ENCOUNTER — OFFICE VISIT (OUTPATIENT)
Dept: PALLIATIVE MEDICINE | Facility: CLINIC | Age: 54
End: 2024-01-01
Payer: MEDICAID

## 2024-01-01 ENCOUNTER — OFFICE VISIT (OUTPATIENT)
Dept: GASTROENTEROLOGY | Facility: CLINIC | Age: 54
End: 2024-01-01
Payer: MEDICAID

## 2024-01-01 ENCOUNTER — PATIENT MESSAGE (OUTPATIENT)
Dept: UROLOGY | Facility: CLINIC | Age: 54
End: 2024-01-01
Payer: MEDICAID

## 2024-01-01 ENCOUNTER — LAB VISIT (OUTPATIENT)
Dept: LAB | Facility: HOSPITAL | Age: 54
End: 2024-01-01
Attending: INTERNAL MEDICINE
Payer: MEDICAID

## 2024-01-01 ENCOUNTER — INFUSION (OUTPATIENT)
Dept: INFUSION THERAPY | Facility: HOSPITAL | Age: 54
End: 2024-01-01
Attending: INTERNAL MEDICINE
Payer: MEDICAID

## 2024-01-01 ENCOUNTER — OFFICE VISIT (OUTPATIENT)
Dept: HEMATOLOGY/ONCOLOGY | Facility: CLINIC | Age: 54
End: 2024-01-01
Payer: MEDICAID

## 2024-01-01 ENCOUNTER — DOCUMENTATION ONLY (OUTPATIENT)
Dept: HEMATOLOGY/ONCOLOGY | Facility: CLINIC | Age: 54
End: 2024-01-01
Payer: MEDICAID

## 2024-01-01 ENCOUNTER — ANESTHESIA (OUTPATIENT)
Dept: SURGERY | Facility: HOSPITAL | Age: 54
DRG: 947 | End: 2024-01-01
Payer: MEDICAID

## 2024-01-01 ENCOUNTER — PATIENT MESSAGE (OUTPATIENT)
Dept: PALLIATIVE MEDICINE | Facility: CLINIC | Age: 54
End: 2024-01-01
Payer: MEDICAID

## 2024-01-01 ENCOUNTER — OFFICE VISIT (OUTPATIENT)
Dept: GYNECOLOGIC ONCOLOGY | Facility: CLINIC | Age: 54
End: 2024-01-01
Payer: MEDICAID

## 2024-01-01 ENCOUNTER — PATIENT MESSAGE (OUTPATIENT)
Dept: HEMATOLOGY/ONCOLOGY | Facility: CLINIC | Age: 54
End: 2024-01-01

## 2024-01-01 ENCOUNTER — HOSPITAL ENCOUNTER (OUTPATIENT)
Dept: RADIOLOGY | Facility: HOSPITAL | Age: 54
Discharge: HOME | End: 2024-04-16
Attending: INTERNAL MEDICINE
Payer: MEDICAID

## 2024-01-01 ENCOUNTER — ANESTHESIA (OUTPATIENT)
Dept: INTERVENTIONAL RADIOLOGY/VASCULAR | Facility: HOSPITAL | Age: 54
DRG: 947 | End: 2024-01-01
Payer: MEDICAID

## 2024-01-01 ENCOUNTER — PATIENT MESSAGE (OUTPATIENT)
Dept: REHABILITATION | Facility: HOSPITAL | Age: 54
End: 2024-01-01

## 2024-01-01 ENCOUNTER — PATIENT MESSAGE (OUTPATIENT)
Dept: GASTROENTEROLOGY | Facility: CLINIC | Age: 54
End: 2024-01-01
Payer: MEDICAID

## 2024-01-01 ENCOUNTER — OFFICE VISIT (OUTPATIENT)
Dept: PSYCHIATRY | Facility: CLINIC | Age: 54
DRG: 947 | End: 2024-01-01
Payer: MEDICAID

## 2024-01-01 ENCOUNTER — HOSPITAL ENCOUNTER (INPATIENT)
Facility: HOSPITAL | Age: 54
LOS: 12 days | Discharge: HOSPICE | DRG: 947 | End: 2024-05-01
Attending: EMERGENCY MEDICINE | Admitting: INTERNAL MEDICINE
Payer: MEDICAID

## 2024-01-01 ENCOUNTER — PATIENT MESSAGE (OUTPATIENT)
Dept: PSYCHIATRY | Facility: CLINIC | Age: 54
End: 2024-01-01
Payer: MEDICAID

## 2024-01-01 ENCOUNTER — OFFICE VISIT (OUTPATIENT)
Dept: PSYCHIATRY | Facility: CLINIC | Age: 54
End: 2024-01-01
Payer: MEDICAID

## 2024-01-01 ENCOUNTER — HOSPITAL ENCOUNTER (OUTPATIENT)
Dept: RADIOLOGY | Facility: HOSPITAL | Age: 54
Discharge: HOME | End: 2024-04-04
Attending: INTERNAL MEDICINE
Payer: MEDICAID

## 2024-01-01 ENCOUNTER — TELEPHONE (OUTPATIENT)
Dept: PSYCHIATRY | Facility: CLINIC | Age: 54
End: 2024-01-01
Payer: MEDICAID

## 2024-01-01 ENCOUNTER — HOSPITAL ENCOUNTER (OUTPATIENT)
Dept: RADIOLOGY | Facility: HOSPITAL | Age: 54
Discharge: HOME | End: 2024-03-20
Attending: INTERNAL MEDICINE
Payer: MEDICAID

## 2024-01-01 ENCOUNTER — HOSPITAL ENCOUNTER (OUTPATIENT)
Dept: RADIOLOGY | Facility: HOSPITAL | Age: 54
Discharge: HOME | End: 2024-04-10
Attending: INTERNAL MEDICINE
Payer: MEDICAID

## 2024-01-01 ENCOUNTER — DOCUMENTATION ONLY (OUTPATIENT)
Dept: INFUSION THERAPY | Facility: HOSPITAL | Age: 54
End: 2024-01-01
Payer: MEDICAID

## 2024-01-01 ENCOUNTER — TELEPHONE (OUTPATIENT)
Dept: GYNECOLOGIC ONCOLOGY | Facility: CLINIC | Age: 54
End: 2024-01-01
Payer: MEDICAID

## 2024-01-01 ENCOUNTER — HOSPITAL ENCOUNTER (OUTPATIENT)
Dept: RADIOLOGY | Facility: HOSPITAL | Age: 54
Discharge: HOME | End: 2024-01-04
Attending: INTERNAL MEDICINE
Payer: MEDICAID

## 2024-01-01 ENCOUNTER — HOSPITAL ENCOUNTER (INPATIENT)
Facility: HOSPITAL | Age: 54
LOS: 1 days | DRG: 951 | End: 2024-05-02
Attending: HOSPITALIST | Admitting: HOSPITALIST
Payer: OTHER MISCELLANEOUS

## 2024-01-01 ENCOUNTER — PATIENT MESSAGE (OUTPATIENT)
Dept: GYNECOLOGIC ONCOLOGY | Facility: CLINIC | Age: 54
End: 2024-01-01
Payer: MEDICAID

## 2024-01-01 VITALS
WEIGHT: 277.31 LBS | SYSTOLIC BLOOD PRESSURE: 122 MMHG | HEIGHT: 66 IN | DIASTOLIC BLOOD PRESSURE: 80 MMHG | WEIGHT: 281.5 LBS | RESPIRATION RATE: 20 BRPM | HEART RATE: 112 BPM | OXYGEN SATURATION: 95 % | OXYGEN SATURATION: 96 % | DIASTOLIC BLOOD PRESSURE: 77 MMHG | SYSTOLIC BLOOD PRESSURE: 133 MMHG | HEIGHT: 66 IN | BODY MASS INDEX: 44.57 KG/M2 | RESPIRATION RATE: 16 BRPM | TEMPERATURE: 97 F | TEMPERATURE: 98 F | BODY MASS INDEX: 45.24 KG/M2 | HEART RATE: 102 BPM

## 2024-01-01 VITALS
BODY MASS INDEX: 42.73 KG/M2 | DIASTOLIC BLOOD PRESSURE: 90 MMHG | RESPIRATION RATE: 18 BRPM | HEART RATE: 92 BPM | RESPIRATION RATE: 16 BRPM | WEIGHT: 265.88 LBS | SYSTOLIC BLOOD PRESSURE: 145 MMHG | WEIGHT: 268 LBS | OXYGEN SATURATION: 97 % | HEART RATE: 92 BPM | TEMPERATURE: 97 F | HEIGHT: 66 IN | BODY MASS INDEX: 43.07 KG/M2 | HEIGHT: 66 IN | DIASTOLIC BLOOD PRESSURE: 90 MMHG | TEMPERATURE: 98 F | SYSTOLIC BLOOD PRESSURE: 152 MMHG

## 2024-01-01 VITALS
WEIGHT: 269.81 LBS | HEART RATE: 82 BPM | SYSTOLIC BLOOD PRESSURE: 163 MMHG | RESPIRATION RATE: 16 BRPM | HEIGHT: 66 IN | BODY MASS INDEX: 43.36 KG/M2 | TEMPERATURE: 98 F | DIASTOLIC BLOOD PRESSURE: 95 MMHG | OXYGEN SATURATION: 98 %

## 2024-01-01 VITALS
RESPIRATION RATE: 16 BRPM | TEMPERATURE: 98 F | DIASTOLIC BLOOD PRESSURE: 60 MMHG | HEIGHT: 66 IN | OXYGEN SATURATION: 97 % | BODY MASS INDEX: 45.6 KG/M2 | HEART RATE: 105 BPM | SYSTOLIC BLOOD PRESSURE: 108 MMHG | WEIGHT: 283.75 LBS

## 2024-01-01 VITALS
DIASTOLIC BLOOD PRESSURE: 86 MMHG | WEIGHT: 272.06 LBS | BODY MASS INDEX: 43.36 KG/M2 | HEIGHT: 66 IN | BODY MASS INDEX: 43.72 KG/M2 | OXYGEN SATURATION: 98 % | RESPIRATION RATE: 16 BRPM | TEMPERATURE: 98 F | HEIGHT: 66 IN | HEART RATE: 104 BPM | SYSTOLIC BLOOD PRESSURE: 150 MMHG | WEIGHT: 269.81 LBS

## 2024-01-01 VITALS
HEIGHT: 66 IN | BODY MASS INDEX: 44.39 KG/M2 | HEART RATE: 98 BPM | RESPIRATION RATE: 18 BRPM | SYSTOLIC BLOOD PRESSURE: 123 MMHG | WEIGHT: 276.25 LBS | DIASTOLIC BLOOD PRESSURE: 76 MMHG | OXYGEN SATURATION: 97 % | TEMPERATURE: 98 F

## 2024-01-01 VITALS
RESPIRATION RATE: 16 BRPM | WEIGHT: 268.31 LBS | SYSTOLIC BLOOD PRESSURE: 135 MMHG | HEART RATE: 90 BPM | BODY MASS INDEX: 43.12 KG/M2 | DIASTOLIC BLOOD PRESSURE: 84 MMHG | HEIGHT: 66 IN | TEMPERATURE: 97 F | OXYGEN SATURATION: 97 %

## 2024-01-01 VITALS
HEIGHT: 66 IN | HEART RATE: 85 BPM | SYSTOLIC BLOOD PRESSURE: 167 MMHG | WEIGHT: 265.44 LBS | OXYGEN SATURATION: 96 % | BODY MASS INDEX: 42.66 KG/M2 | DIASTOLIC BLOOD PRESSURE: 92 MMHG | TEMPERATURE: 97 F | RESPIRATION RATE: 18 BRPM

## 2024-01-01 VITALS
TEMPERATURE: 98 F | RESPIRATION RATE: 15 BRPM | HEART RATE: 89 BPM | SYSTOLIC BLOOD PRESSURE: 131 MMHG | OXYGEN SATURATION: 98 % | HEIGHT: 66 IN | BODY MASS INDEX: 44.15 KG/M2 | DIASTOLIC BLOOD PRESSURE: 79 MMHG | WEIGHT: 274.69 LBS

## 2024-01-01 VITALS
WEIGHT: 265.44 LBS | HEART RATE: 91 BPM | BODY MASS INDEX: 42.66 KG/M2 | RESPIRATION RATE: 18 BRPM | TEMPERATURE: 97 F | SYSTOLIC BLOOD PRESSURE: 159 MMHG | HEIGHT: 66 IN | OXYGEN SATURATION: 96 % | DIASTOLIC BLOOD PRESSURE: 97 MMHG

## 2024-01-01 VITALS
BODY MASS INDEX: 43.08 KG/M2 | HEIGHT: 66 IN | DIASTOLIC BLOOD PRESSURE: 83 MMHG | TEMPERATURE: 97 F | RESPIRATION RATE: 18 BRPM | OXYGEN SATURATION: 98 % | SYSTOLIC BLOOD PRESSURE: 139 MMHG | HEART RATE: 76 BPM | WEIGHT: 268.06 LBS

## 2024-01-01 VITALS
HEIGHT: 66 IN | TEMPERATURE: 97 F | DIASTOLIC BLOOD PRESSURE: 80 MMHG | SYSTOLIC BLOOD PRESSURE: 110 MMHG | WEIGHT: 277.13 LBS | HEART RATE: 102 BPM | OXYGEN SATURATION: 97 % | BODY MASS INDEX: 44.54 KG/M2 | RESPIRATION RATE: 16 BRPM

## 2024-01-01 VITALS
HEIGHT: 67 IN | WEIGHT: 293 LBS | OXYGEN SATURATION: 92 % | TEMPERATURE: 100 F | DIASTOLIC BLOOD PRESSURE: 76 MMHG | HEART RATE: 170 BPM | RESPIRATION RATE: 37 BRPM | SYSTOLIC BLOOD PRESSURE: 136 MMHG | BODY MASS INDEX: 45.99 KG/M2

## 2024-01-01 VITALS — HEART RATE: 152 BPM | OXYGEN SATURATION: 86 % | RESPIRATION RATE: 34 BRPM

## 2024-01-01 VITALS — BODY MASS INDEX: 43.07 KG/M2 | HEIGHT: 66 IN | WEIGHT: 268 LBS

## 2024-01-01 VITALS
TEMPERATURE: 98 F | OXYGEN SATURATION: 98 % | WEIGHT: 274.69 LBS | DIASTOLIC BLOOD PRESSURE: 81 MMHG | BODY MASS INDEX: 44.15 KG/M2 | SYSTOLIC BLOOD PRESSURE: 142 MMHG | HEIGHT: 66 IN | RESPIRATION RATE: 15 BRPM | HEART RATE: 87 BPM

## 2024-01-01 DIAGNOSIS — R60.9 EDEMA, UNSPECIFIED TYPE: ICD-10-CM

## 2024-01-01 DIAGNOSIS — Z79.899 IMMUNODEFICIENCY DUE TO CHEMOTHERAPY: ICD-10-CM

## 2024-01-01 DIAGNOSIS — C78.6 SECONDARY MALIGNANT NEOPLASM OF RETROPERITONEUM: ICD-10-CM

## 2024-01-01 DIAGNOSIS — C18.7 MALIGNANT NEOPLASM OF SIGMOID COLON: ICD-10-CM

## 2024-01-01 DIAGNOSIS — C77.2 METASTASIS TO INTESTINAL LYMPH NODE: ICD-10-CM

## 2024-01-01 DIAGNOSIS — R10.9 ABDOMINAL PAIN, UNSPECIFIED ABDOMINAL LOCATION: ICD-10-CM

## 2024-01-01 DIAGNOSIS — R07.9 CHEST PAIN: ICD-10-CM

## 2024-01-01 DIAGNOSIS — D84.821 IMMUNODEFICIENCY DUE TO CHEMOTHERAPY: ICD-10-CM

## 2024-01-01 DIAGNOSIS — E86.0 DEHYDRATION: ICD-10-CM

## 2024-01-01 DIAGNOSIS — N17.9 AKI (ACUTE KIDNEY INJURY): Primary | ICD-10-CM

## 2024-01-01 DIAGNOSIS — G89.3 CANCER RELATED PAIN: ICD-10-CM

## 2024-01-01 DIAGNOSIS — C18.9 COLON CANCER: ICD-10-CM

## 2024-01-01 DIAGNOSIS — C77.9 SECONDARY ADENOCARCINOMA OF LYMPH NODE: ICD-10-CM

## 2024-01-01 DIAGNOSIS — K04.7 TOOTH ABSCESS: Primary | ICD-10-CM

## 2024-01-01 DIAGNOSIS — F32.A ANXIETY AND DEPRESSION: Primary | ICD-10-CM

## 2024-01-01 DIAGNOSIS — R11.0 NAUSEA: ICD-10-CM

## 2024-01-01 DIAGNOSIS — Z51.5 PALLIATIVE CARE BY SPECIALIST: ICD-10-CM

## 2024-01-01 DIAGNOSIS — M62.838 MUSCLE SPASMS OF LOWER EXTREMITY: Primary | ICD-10-CM

## 2024-01-01 DIAGNOSIS — C18.7 MALIGNANT NEOPLASM OF SIGMOID COLON: Primary | ICD-10-CM

## 2024-01-01 DIAGNOSIS — F41.9 ANXIETY AND DEPRESSION: Primary | ICD-10-CM

## 2024-01-01 DIAGNOSIS — D50.0 IRON DEFICIENCY ANEMIA DUE TO CHRONIC BLOOD LOSS: ICD-10-CM

## 2024-01-01 DIAGNOSIS — K12.30 ORAL MUCOSITIS: ICD-10-CM

## 2024-01-01 DIAGNOSIS — Z00.6 EXAMINATION OF PARTICIPANT IN CLINICAL TRIAL: ICD-10-CM

## 2024-01-01 DIAGNOSIS — N20.0 RENAL STONES: ICD-10-CM

## 2024-01-01 DIAGNOSIS — F43.23 ADJUSTMENT DISORDER WITH MIXED ANXIETY AND DEPRESSED MOOD: Primary | ICD-10-CM

## 2024-01-01 DIAGNOSIS — K21.9 GASTROESOPHAGEAL REFLUX DISEASE, UNSPECIFIED WHETHER ESOPHAGITIS PRESENT: ICD-10-CM

## 2024-01-01 DIAGNOSIS — R11.2 NAUSEA AND VOMITING, UNSPECIFIED VOMITING TYPE: ICD-10-CM

## 2024-01-01 DIAGNOSIS — F32.A DEPRESSIVE DISORDER: ICD-10-CM

## 2024-01-01 DIAGNOSIS — C78.6 SECONDARY MALIGNANT NEOPLASM OF RETROPERITONEUM: Primary | ICD-10-CM

## 2024-01-01 DIAGNOSIS — K04.7 DENTAL ABSCESS: ICD-10-CM

## 2024-01-01 DIAGNOSIS — R19.8 ALTERNATING CONSTIPATION AND DIARRHEA: ICD-10-CM

## 2024-01-01 DIAGNOSIS — D50.0 IRON DEFICIENCY ANEMIA DUE TO CHRONIC BLOOD LOSS: Primary | ICD-10-CM

## 2024-01-01 DIAGNOSIS — J96.01 ACUTE HYPOXEMIC RESPIRATORY FAILURE: ICD-10-CM

## 2024-01-01 DIAGNOSIS — F41.9 ANXIETY: ICD-10-CM

## 2024-01-01 DIAGNOSIS — N90.89 VULVAL LESION: ICD-10-CM

## 2024-01-01 DIAGNOSIS — C77.2 METASTASIS TO INTESTINAL LYMPH NODE: Primary | ICD-10-CM

## 2024-01-01 DIAGNOSIS — G89.29 CHRONIC MIDLINE LOW BACK PAIN WITHOUT SCIATICA: ICD-10-CM

## 2024-01-01 DIAGNOSIS — M79.606 LEG PAIN: ICD-10-CM

## 2024-01-01 DIAGNOSIS — N13.30 HYDRONEPHROSIS, BILATERAL: Primary | ICD-10-CM

## 2024-01-01 DIAGNOSIS — R60.9 EDEMA, UNSPECIFIED TYPE: Primary | ICD-10-CM

## 2024-01-01 DIAGNOSIS — R10.13 EPIGASTRIC PAIN: Primary | ICD-10-CM

## 2024-01-01 DIAGNOSIS — T45.1X5A IMMUNODEFICIENCY DUE TO CHEMOTHERAPY: ICD-10-CM

## 2024-01-01 DIAGNOSIS — N90.89 VULVAL LESION: Primary | ICD-10-CM

## 2024-01-01 DIAGNOSIS — K80.20 GALLSTONES: ICD-10-CM

## 2024-01-01 DIAGNOSIS — F51.04 PSYCHOPHYSIOLOGICAL INSOMNIA: ICD-10-CM

## 2024-01-01 DIAGNOSIS — T45.1X5A CHEMOTHERAPY-INDUCED FATIGUE: ICD-10-CM

## 2024-01-01 DIAGNOSIS — R53.83 CHEMOTHERAPY-INDUCED FATIGUE: ICD-10-CM

## 2024-01-01 DIAGNOSIS — N90.89 VULVAR LESION: Primary | ICD-10-CM

## 2024-01-01 DIAGNOSIS — C78.7 SECONDARY LIVER CANCER: ICD-10-CM

## 2024-01-01 DIAGNOSIS — Z01.818 PRE-OP TESTING: ICD-10-CM

## 2024-01-01 DIAGNOSIS — Z85.038 HISTORY OF COLON CANCER: ICD-10-CM

## 2024-01-01 DIAGNOSIS — K59.00 CONSTIPATION, UNSPECIFIED CONSTIPATION TYPE: Primary | ICD-10-CM

## 2024-01-01 DIAGNOSIS — D50.9 MICROCYTIC ANEMIA: ICD-10-CM

## 2024-01-01 DIAGNOSIS — F33.1 MODERATE EPISODE OF RECURRENT MAJOR DEPRESSIVE DISORDER: ICD-10-CM

## 2024-01-01 DIAGNOSIS — M79.605 LEFT LEG PAIN: ICD-10-CM

## 2024-01-01 DIAGNOSIS — E87.6 HYPOKALEMIA: ICD-10-CM

## 2024-01-01 DIAGNOSIS — E04.2 MULTINODULAR GOITER: ICD-10-CM

## 2024-01-01 DIAGNOSIS — M54.50 CHRONIC MIDLINE LOW BACK PAIN WITHOUT SCIATICA: ICD-10-CM

## 2024-01-01 DIAGNOSIS — N17.9 ACUTE RENAL FAILURE, UNSPECIFIED ACUTE RENAL FAILURE TYPE: Primary | ICD-10-CM

## 2024-01-01 DIAGNOSIS — R10.32 GROIN PAIN, LEFT: Primary | ICD-10-CM

## 2024-01-01 DIAGNOSIS — M25.552 PAIN OF LEFT HIP: ICD-10-CM

## 2024-01-01 DIAGNOSIS — R10.84 GENERALIZED ABDOMINAL PAIN: ICD-10-CM

## 2024-01-01 DIAGNOSIS — F41.9 ANXIETY: Primary | ICD-10-CM

## 2024-01-01 DIAGNOSIS — N13.30 HYDRONEPHROSIS, BILATERAL: ICD-10-CM

## 2024-01-01 DIAGNOSIS — Z87.19 HISTORY OF PANCREATITIS: ICD-10-CM

## 2024-01-01 DIAGNOSIS — F32.A DEPRESSION, UNSPECIFIED DEPRESSION TYPE: ICD-10-CM

## 2024-01-01 DIAGNOSIS — R10.32 GROIN PAIN, LEFT: ICD-10-CM

## 2024-01-01 DIAGNOSIS — R14.2 BELCHING: ICD-10-CM

## 2024-01-01 DIAGNOSIS — N17.9 ACUTE RENAL FAILURE, UNSPECIFIED ACUTE RENAL FAILURE TYPE: ICD-10-CM

## 2024-01-01 DIAGNOSIS — E21.0 PRIMARY HYPERPARATHYROIDISM: Primary | ICD-10-CM

## 2024-01-01 DIAGNOSIS — R00.0 TACHYCARDIA: ICD-10-CM

## 2024-01-01 DIAGNOSIS — R14.0 ABDOMINAL BLOATING: ICD-10-CM

## 2024-01-01 LAB
ABO + RH BLD: NORMAL
ABO + RH BLD: NORMAL
ALBUMIN SERPL BCP-MCNC: 1.8 G/DL (ref 3.5–5.2)
ALBUMIN SERPL BCP-MCNC: 2 G/DL (ref 3.5–5.2)
ALBUMIN SERPL BCP-MCNC: 2.1 G/DL (ref 3.5–5.2)
ALBUMIN SERPL BCP-MCNC: 2.2 G/DL (ref 3.5–5.2)
ALBUMIN SERPL BCP-MCNC: 2.3 G/DL (ref 3.5–5.2)
ALBUMIN SERPL BCP-MCNC: 2.3 G/DL (ref 3.5–5.2)
ALBUMIN SERPL BCP-MCNC: 2.4 G/DL (ref 3.5–5.2)
ALBUMIN SERPL BCP-MCNC: 2.5 G/DL (ref 3.5–5.2)
ALBUMIN SERPL BCP-MCNC: 3.4 G/DL (ref 3.5–5.2)
ALBUMIN SERPL BCP-MCNC: 3.5 G/DL (ref 3.5–5.2)
ALBUMIN SERPL BCP-MCNC: 3.5 G/DL (ref 3.5–5.2)
ALBUMIN SERPL BCP-MCNC: 3.6 G/DL (ref 3.5–5.2)
ALLENS TEST: ABNORMAL
ALLENS TEST: ABNORMAL
ALLENS TEST: NORMAL
ALP SERPL-CCNC: 101 U/L (ref 55–135)
ALP SERPL-CCNC: 102 U/L (ref 55–135)
ALP SERPL-CCNC: 103 U/L (ref 55–135)
ALP SERPL-CCNC: 105 U/L (ref 55–135)
ALP SERPL-CCNC: 107 U/L (ref 55–135)
ALP SERPL-CCNC: 108 U/L (ref 55–135)
ALP SERPL-CCNC: 110 U/L (ref 55–135)
ALP SERPL-CCNC: 123 U/L (ref 55–135)
ALP SERPL-CCNC: 128 U/L (ref 55–135)
ALP SERPL-CCNC: 133 U/L (ref 55–135)
ALP SERPL-CCNC: 135 U/L (ref 55–135)
ALP SERPL-CCNC: 136 U/L (ref 55–135)
ALP SERPL-CCNC: 137 U/L (ref 55–135)
ALP SERPL-CCNC: 164 U/L (ref 55–135)
ALP SERPL-CCNC: 175 U/L (ref 55–135)
ALP SERPL-CCNC: 225 U/L (ref 55–135)
ALT SERPL W/O P-5'-P-CCNC: 10 U/L (ref 10–44)
ALT SERPL W/O P-5'-P-CCNC: 11 U/L (ref 10–44)
ALT SERPL W/O P-5'-P-CCNC: 12 U/L (ref 10–44)
ALT SERPL W/O P-5'-P-CCNC: 15 U/L (ref 10–44)
ALT SERPL W/O P-5'-P-CCNC: 15 U/L (ref 10–44)
ALT SERPL W/O P-5'-P-CCNC: 16 U/L (ref 10–44)
ALT SERPL W/O P-5'-P-CCNC: 19 U/L (ref 10–44)
ALT SERPL W/O P-5'-P-CCNC: 27 U/L (ref 10–44)
ALT SERPL W/O P-5'-P-CCNC: 6 U/L (ref 10–44)
ALT SERPL W/O P-5'-P-CCNC: 7 U/L (ref 10–44)
ALT SERPL W/O P-5'-P-CCNC: 8 U/L (ref 10–44)
ALT SERPL W/O P-5'-P-CCNC: <5 U/L (ref 10–44)
AMMONIA PLAS-SCNC: 26 UMOL/L (ref 10–50)
ANION GAP SERPL CALC-SCNC: 10 MMOL/L (ref 8–16)
ANION GAP SERPL CALC-SCNC: 11 MMOL/L (ref 8–16)
ANION GAP SERPL CALC-SCNC: 12 MMOL/L (ref 8–16)
ANION GAP SERPL CALC-SCNC: 12 MMOL/L (ref 8–16)
ANION GAP SERPL CALC-SCNC: 13 MMOL/L (ref 8–16)
ANION GAP SERPL CALC-SCNC: 13 MMOL/L (ref 8–16)
ANION GAP SERPL CALC-SCNC: 14 MMOL/L (ref 8–16)
ANION GAP SERPL CALC-SCNC: 8 MMOL/L (ref 8–16)
ANION GAP SERPL CALC-SCNC: 9 MMOL/L (ref 8–16)
ANISOCYTOSIS BLD QL SMEAR: ABNORMAL
ANISOCYTOSIS BLD QL SMEAR: ABNORMAL
ANISOCYTOSIS BLD QL SMEAR: SLIGHT
APTT PPP: 58 SEC (ref 21–32)
ASCENDING AORTA: 3.61 CM
AST SERPL-CCNC: 12 U/L (ref 10–40)
AST SERPL-CCNC: 13 U/L (ref 10–40)
AST SERPL-CCNC: 14 U/L (ref 10–40)
AST SERPL-CCNC: 15 U/L (ref 10–40)
AST SERPL-CCNC: 16 U/L (ref 10–40)
AST SERPL-CCNC: 17 U/L (ref 10–40)
AST SERPL-CCNC: 18 U/L (ref 10–40)
AST SERPL-CCNC: 22 U/L (ref 10–40)
AST SERPL-CCNC: 23 U/L (ref 10–40)
AST SERPL-CCNC: 25 U/L (ref 10–40)
AST SERPL-CCNC: 41 U/L (ref 10–40)
AV INDEX (PROSTH): 0.92
AV MEAN GRADIENT: 4 MMHG
AV PEAK GRADIENT: 5 MMHG
AV VALVE AREA BY VELOCITY RATIO: 2.32 CM²
AV VALVE AREA: 2.8 CM²
AV VELOCITY RATIO: 0.76
BACTERIA #/AREA URNS AUTO: ABNORMAL /HPF
BACTERIA #/AREA URNS AUTO: ABNORMAL /HPF
BACTERIA UR CULT: NORMAL
BASO STIPL BLD QL SMEAR: ABNORMAL
BASOPHILS # BLD AUTO: 0.01 K/UL (ref 0–0.2)
BASOPHILS # BLD AUTO: 0.02 K/UL (ref 0–0.2)
BASOPHILS # BLD AUTO: 0.04 K/UL (ref 0–0.2)
BASOPHILS # BLD AUTO: 0.05 K/UL (ref 0–0.2)
BASOPHILS # BLD AUTO: 0.07 K/UL (ref 0–0.2)
BASOPHILS # BLD AUTO: 0.08 K/UL (ref 0–0.2)
BASOPHILS # BLD AUTO: ABNORMAL K/UL (ref 0–0.2)
BASOPHILS NFR BLD: 0 % (ref 0–1.9)
BASOPHILS NFR BLD: 0.1 % (ref 0–1.9)
BASOPHILS NFR BLD: 0.2 % (ref 0–1.9)
BASOPHILS NFR BLD: 0.3 % (ref 0–1.9)
BASOPHILS NFR BLD: 0.4 % (ref 0–1.9)
BASOPHILS NFR BLD: 0.5 % (ref 0–1.9)
BASOPHILS NFR BLD: 0.6 % (ref 0–1.9)
BASOPHILS NFR BLD: 0.7 % (ref 0–1.9)
BASOPHILS NFR BLD: 0.8 % (ref 0–1.9)
BASOPHILS NFR BLD: 0.8 % (ref 0–1.9)
BASOPHILS NFR BLD: 0.9 % (ref 0–1.9)
BASOPHILS NFR BLD: 1.2 % (ref 0–1.9)
BASOPHILS NFR BLD: 1.4 % (ref 0–1.9)
BILIRUB SERPL-MCNC: 0.2 MG/DL (ref 0.1–1)
BILIRUB SERPL-MCNC: 0.3 MG/DL (ref 0.1–1)
BILIRUB SERPL-MCNC: 0.4 MG/DL (ref 0.1–1)
BILIRUB SERPL-MCNC: 0.5 MG/DL (ref 0.1–1)
BILIRUB SERPL-MCNC: 0.6 MG/DL (ref 0.1–1)
BILIRUB SERPL-MCNC: 0.9 MG/DL (ref 0.1–1)
BILIRUB UR QL STRIP: NEGATIVE
BLD GP AB SCN CELLS X3 SERPL QL: NORMAL
BLD GP AB SCN CELLS X3 SERPL QL: NORMAL
BNP SERPL-MCNC: 22 PG/ML (ref 0–99)
BSA FOR ECHO PROCEDURE: 2.77 M2
BUN SERPL-MCNC: 10 MG/DL (ref 6–20)
BUN SERPL-MCNC: 11 MG/DL (ref 6–20)
BUN SERPL-MCNC: 27 MG/DL (ref 6–20)
BUN SERPL-MCNC: 28 MG/DL (ref 6–20)
BUN SERPL-MCNC: 30 MG/DL (ref 6–20)
BUN SERPL-MCNC: 36 MG/DL (ref 6–20)
BUN SERPL-MCNC: 36 MG/DL (ref 6–20)
BUN SERPL-MCNC: 41 MG/DL (ref 6–20)
BUN SERPL-MCNC: 46 MG/DL (ref 6–20)
BUN SERPL-MCNC: 48 MG/DL (ref 6–20)
BUN SERPL-MCNC: 5 MG/DL (ref 6–20)
BUN SERPL-MCNC: 54 MG/DL (ref 6–20)
BUN SERPL-MCNC: 54 MG/DL (ref 6–20)
BUN SERPL-MCNC: 61 MG/DL (ref 6–20)
BUN SERPL-MCNC: 65 MG/DL (ref 6–20)
BUN SERPL-MCNC: 67 MG/DL (ref 6–20)
BUN SERPL-MCNC: 67 MG/DL (ref 6–20)
BUN SERPL-MCNC: 8 MG/DL (ref 6–20)
C DIFF GDH STL QL: NEGATIVE
C DIFF TOX A+B STL QL IA: NEGATIVE
CALCIUM SERPL-MCNC: 10.2 MG/DL (ref 8.7–10.5)
CALCIUM SERPL-MCNC: 10.4 MG/DL (ref 8.7–10.5)
CALCIUM SERPL-MCNC: 10.4 MG/DL (ref 8.7–10.5)
CALCIUM SERPL-MCNC: 10.5 MG/DL (ref 8.7–10.5)
CALCIUM SERPL-MCNC: 10.6 MG/DL (ref 8.7–10.5)
CALCIUM SERPL-MCNC: 10.6 MG/DL (ref 8.7–10.5)
CALCIUM SERPL-MCNC: 10.7 MG/DL (ref 8.7–10.5)
CALCIUM SERPL-MCNC: 10.8 MG/DL (ref 8.7–10.5)
CALCIUM SERPL-MCNC: 8.8 MG/DL (ref 8.7–10.5)
CALCIUM SERPL-MCNC: 9.2 MG/DL (ref 8.7–10.5)
CALCIUM SERPL-MCNC: 9.5 MG/DL (ref 8.7–10.5)
CALCIUM SERPL-MCNC: 9.6 MG/DL (ref 8.7–10.5)
CALCIUM SERPL-MCNC: 9.8 MG/DL (ref 8.7–10.5)
CALCIUM SERPL-MCNC: 9.9 MG/DL (ref 8.7–10.5)
CALCIUM SERPL-MCNC: 9.9 MG/DL (ref 8.7–10.5)
CHLORIDE SERPL-SCNC: 106 MMOL/L (ref 95–110)
CHLORIDE SERPL-SCNC: 107 MMOL/L (ref 95–110)
CHLORIDE SERPL-SCNC: 108 MMOL/L (ref 95–110)
CHLORIDE SERPL-SCNC: 108 MMOL/L (ref 95–110)
CHLORIDE SERPL-SCNC: 109 MMOL/L (ref 95–110)
CHLORIDE SERPL-SCNC: 110 MMOL/L (ref 95–110)
CHLORIDE SERPL-SCNC: 111 MMOL/L (ref 95–110)
CHLORIDE SERPL-SCNC: 111 MMOL/L (ref 95–110)
CHLORIDE SERPL-SCNC: 112 MMOL/L (ref 95–110)
CHLORIDE SERPL-SCNC: 112 MMOL/L (ref 95–110)
CHLORIDE SERPL-SCNC: 113 MMOL/L (ref 95–110)
CK SERPL-CCNC: 27 U/L (ref 20–180)
CLARITY UR REFRACT.AUTO: ABNORMAL
CLARITY UR REFRACT.AUTO: ABNORMAL
CLARITY UR: CLEAR
CO2 SERPL-SCNC: 12 MMOL/L (ref 23–29)
CO2 SERPL-SCNC: 12 MMOL/L (ref 23–29)
CO2 SERPL-SCNC: 13 MMOL/L (ref 23–29)
CO2 SERPL-SCNC: 14 MMOL/L (ref 23–29)
CO2 SERPL-SCNC: 15 MMOL/L (ref 23–29)
CO2 SERPL-SCNC: 16 MMOL/L (ref 23–29)
CO2 SERPL-SCNC: 17 MMOL/L (ref 23–29)
CO2 SERPL-SCNC: 19 MMOL/L (ref 23–29)
CO2 SERPL-SCNC: 19 MMOL/L (ref 23–29)
CO2 SERPL-SCNC: 21 MMOL/L (ref 23–29)
CO2 SERPL-SCNC: 22 MMOL/L (ref 23–29)
CO2 SERPL-SCNC: 23 MMOL/L (ref 23–29)
CO2 SERPL-SCNC: 24 MMOL/L (ref 23–29)
COLOR UR AUTO: YELLOW
COLOR UR AUTO: YELLOW
COLOR UR: YELLOW
CREAT SERPL-MCNC: 0.8 MG/DL (ref 0.5–1.4)
CREAT SERPL-MCNC: 1 MG/DL (ref 0.5–1.4)
CREAT SERPL-MCNC: 1.1 MG/DL (ref 0.5–1.4)
CREAT SERPL-MCNC: 1.1 MG/DL (ref 0.5–1.4)
CREAT SERPL-MCNC: 1.4 MG/DL (ref 0.5–1.4)
CREAT SERPL-MCNC: 2.3 MG/DL (ref 0.5–1.4)
CREAT SERPL-MCNC: 2.6 MG/DL (ref 0.5–1.4)
CREAT SERPL-MCNC: 2.8 MG/DL (ref 0.5–1.4)
CREAT SERPL-MCNC: 3.1 MG/DL (ref 0.5–1.4)
CREAT SERPL-MCNC: 3.1 MG/DL (ref 0.5–1.4)
CREAT SERPL-MCNC: 3.2 MG/DL (ref 0.5–1.4)
CREAT SERPL-MCNC: 3.4 MG/DL (ref 0.5–1.4)
CREAT SERPL-MCNC: 3.5 MG/DL (ref 0.5–1.4)
CREAT SERPL-MCNC: 3.6 MG/DL (ref 0.5–1.4)
CREAT SERPL-MCNC: 3.8 MG/DL (ref 0.5–1.4)
CREAT SERPL-MCNC: 3.9 MG/DL (ref 0.5–1.4)
CRP SERPL-MCNC: 72.5 MG/L (ref 0–8.2)
CV ECHO LV RWT: 0.5 CM
DACRYOCYTES BLD QL SMEAR: ABNORMAL
DELSYS: ABNORMAL
DELSYS: NORMAL
DIFFERENTIAL METHOD BLD: ABNORMAL
DOP CALC AO PEAK VEL: 1.17 M/S
DOP CALC AO VTI: 14.94 CM
DOP CALC LVOT AREA: 3 CM2
DOP CALC LVOT DIAMETER: 1.97 CM
DOP CALC LVOT PEAK VEL: 0.89 M/S
DOP CALC LVOT STROKE VOLUME: 41.89 CM3
DOP CALCLVOT PEAK VEL VTI: 13.75 CM
ECHO LV POSTERIOR WALL: 0.99 CM (ref 0.6–1.1)
EOSINOPHIL # BLD AUTO: 0 K/UL (ref 0–0.5)
EOSINOPHIL # BLD AUTO: 0.1 K/UL (ref 0–0.5)
EOSINOPHIL # BLD AUTO: 0.3 K/UL (ref 0–0.5)
EOSINOPHIL # BLD AUTO: 0.3 K/UL (ref 0–0.5)
EOSINOPHIL # BLD AUTO: ABNORMAL K/UL (ref 0–0.5)
EOSINOPHIL NFR BLD: 0 % (ref 0–8)
EOSINOPHIL NFR BLD: 0.2 % (ref 0–8)
EOSINOPHIL NFR BLD: 0.3 % (ref 0–8)
EOSINOPHIL NFR BLD: 0.3 % (ref 0–8)
EOSINOPHIL NFR BLD: 0.4 % (ref 0–8)
EOSINOPHIL NFR BLD: 0.7 % (ref 0–8)
EOSINOPHIL NFR BLD: 0.7 % (ref 0–8)
EOSINOPHIL NFR BLD: 0.9 % (ref 0–8)
EOSINOPHIL NFR BLD: 0.9 % (ref 0–8)
EOSINOPHIL NFR BLD: 1.1 % (ref 0–8)
EOSINOPHIL NFR BLD: 1.2 % (ref 0–8)
EOSINOPHIL NFR BLD: 1.3 % (ref 0–8)
EOSINOPHIL NFR BLD: 1.3 % (ref 0–8)
EOSINOPHIL NFR BLD: 2.8 % (ref 0–8)
EOSINOPHIL NFR BLD: 4.2 % (ref 0–8)
EOSINOPHIL NFR BLD: 5.3 % (ref 0–8)
ERYTHROCYTE [DISTWIDTH] IN BLOOD BY AUTOMATED COUNT: 18.2 % (ref 11.5–14.5)
ERYTHROCYTE [DISTWIDTH] IN BLOOD BY AUTOMATED COUNT: 18.4 % (ref 11.5–14.5)
ERYTHROCYTE [DISTWIDTH] IN BLOOD BY AUTOMATED COUNT: 19.5 % (ref 11.5–14.5)
ERYTHROCYTE [DISTWIDTH] IN BLOOD BY AUTOMATED COUNT: 23.5 % (ref 11.5–14.5)
ERYTHROCYTE [DISTWIDTH] IN BLOOD BY AUTOMATED COUNT: 26.9 % (ref 11.5–14.5)
ERYTHROCYTE [DISTWIDTH] IN BLOOD BY AUTOMATED COUNT: 27.6 % (ref 11.5–14.5)
ERYTHROCYTE [DISTWIDTH] IN BLOOD BY AUTOMATED COUNT: 27.7 % (ref 11.5–14.5)
ERYTHROCYTE [DISTWIDTH] IN BLOOD BY AUTOMATED COUNT: 27.8 % (ref 11.5–14.5)
ERYTHROCYTE [DISTWIDTH] IN BLOOD BY AUTOMATED COUNT: 27.9 % (ref 11.5–14.5)
ERYTHROCYTE [DISTWIDTH] IN BLOOD BY AUTOMATED COUNT: 28.2 % (ref 11.5–14.5)
ERYTHROCYTE [DISTWIDTH] IN BLOOD BY AUTOMATED COUNT: 28.2 % (ref 11.5–14.5)
ERYTHROCYTE [DISTWIDTH] IN BLOOD BY AUTOMATED COUNT: 28.3 % (ref 11.5–14.5)
ERYTHROCYTE [DISTWIDTH] IN BLOOD BY AUTOMATED COUNT: 28.3 % (ref 11.5–14.5)
ERYTHROCYTE [DISTWIDTH] IN BLOOD BY AUTOMATED COUNT: 28.4 % (ref 11.5–14.5)
ERYTHROCYTE [DISTWIDTH] IN BLOOD BY AUTOMATED COUNT: 28.5 % (ref 11.5–14.5)
ERYTHROCYTE [DISTWIDTH] IN BLOOD BY AUTOMATED COUNT: 28.6 % (ref 11.5–14.5)
ERYTHROCYTE [DISTWIDTH] IN BLOOD BY AUTOMATED COUNT: 28.8 % (ref 11.5–14.5)
ERYTHROCYTE [SEDIMENTATION RATE] IN BLOOD BY PHOTOMETRIC METHOD: 66 MM/HR (ref 0–36)
EST. GFR  (NO RACE VARIABLE): 12.9 ML/MIN/1.73 M^2
EST. GFR  (NO RACE VARIABLE): 13.3 ML/MIN/1.73 M^2
EST. GFR  (NO RACE VARIABLE): 14.2 ML/MIN/1.73 M^2
EST. GFR  (NO RACE VARIABLE): 14.7 ML/MIN/1.73 M^2
EST. GFR  (NO RACE VARIABLE): 15.2 ML/MIN/1.73 M^2
EST. GFR  (NO RACE VARIABLE): 16.4 ML/MIN/1.73 M^2
EST. GFR  (NO RACE VARIABLE): 17 ML/MIN/1.73 M^2
EST. GFR  (NO RACE VARIABLE): 17 ML/MIN/1.73 M^2
EST. GFR  (NO RACE VARIABLE): 19.2 ML/MIN/1.73 M^2
EST. GFR  (NO RACE VARIABLE): 21 ML/MIN/1.73 M^2
EST. GFR  (NO RACE VARIABLE): 24.3 ML/MIN/1.73 M^2
EST. GFR  (NO RACE VARIABLE): 44.2 ML/MIN/1.73 M^2
EST. GFR  (NO RACE VARIABLE): 59 ML/MIN/1.73 M^2
EST. GFR  (NO RACE VARIABLE): 59 ML/MIN/1.73 M^2
EST. GFR  (NO RACE VARIABLE): >60 ML/MIN/1.73 M^2
FERRITIN SERPL-MCNC: 14 NG/ML (ref 20–300)
FLOW: 15
FLOW: 15
FRACTIONAL SHORTENING: 32 % (ref 28–44)
GLUCOSE SERPL-MCNC: 101 MG/DL (ref 70–110)
GLUCOSE SERPL-MCNC: 101 MG/DL (ref 70–110)
GLUCOSE SERPL-MCNC: 103 MG/DL (ref 70–110)
GLUCOSE SERPL-MCNC: 104 MG/DL (ref 70–110)
GLUCOSE SERPL-MCNC: 104 MG/DL (ref 70–110)
GLUCOSE SERPL-MCNC: 107 MG/DL (ref 70–110)
GLUCOSE SERPL-MCNC: 110 MG/DL (ref 70–110)
GLUCOSE SERPL-MCNC: 110 MG/DL (ref 70–110)
GLUCOSE SERPL-MCNC: 116 MG/DL (ref 70–110)
GLUCOSE SERPL-MCNC: 137 MG/DL (ref 70–110)
GLUCOSE SERPL-MCNC: 75 MG/DL (ref 70–110)
GLUCOSE SERPL-MCNC: 86 MG/DL (ref 70–110)
GLUCOSE SERPL-MCNC: 86 MG/DL (ref 70–110)
GLUCOSE SERPL-MCNC: 92 MG/DL (ref 70–110)
GLUCOSE SERPL-MCNC: 95 MG/DL (ref 70–110)
GLUCOSE SERPL-MCNC: 97 MG/DL (ref 70–110)
GLUCOSE SERPL-MCNC: 98 MG/DL (ref 70–110)
GLUCOSE UR QL STRIP: NEGATIVE
HCO3 UR-SCNC: 14.5 MMOL/L (ref 24–28)
HCO3 UR-SCNC: 16 MMOL/L (ref 24–28)
HCT VFR BLD AUTO: 23.2 % (ref 37–48.5)
HCT VFR BLD AUTO: 29.3 % (ref 37–48.5)
HCT VFR BLD AUTO: 29.7 % (ref 37–48.5)
HCT VFR BLD AUTO: 30.3 % (ref 37–48.5)
HCT VFR BLD AUTO: 31.2 % (ref 37–48.5)
HCT VFR BLD AUTO: 32.3 % (ref 37–48.5)
HCT VFR BLD AUTO: 33.6 % (ref 37–48.5)
HCT VFR BLD AUTO: 35 % (ref 37–48.5)
HCT VFR BLD AUTO: 35.2 % (ref 37–48.5)
HCT VFR BLD AUTO: 35.6 % (ref 37–48.5)
HCT VFR BLD AUTO: 37.4 % (ref 37–48.5)
HCT VFR BLD AUTO: 37.9 % (ref 37–48.5)
HCT VFR BLD AUTO: 40.3 % (ref 37–48.5)
HCT VFR BLD AUTO: 40.5 % (ref 37–48.5)
HCT VFR BLD AUTO: 40.8 % (ref 37–48.5)
HCT VFR BLD AUTO: 42.2 % (ref 37–48.5)
HCT VFR BLD AUTO: 43.1 % (ref 37–48.5)
HCT VFR BLD AUTO: 44.4 % (ref 37–48.5)
HCT VFR BLD AUTO: 45.2 % (ref 37–48.5)
HCT VFR BLD CALC: 32 %PCV (ref 36–54)
HGB BLD-MCNC: 10.4 G/DL (ref 12–16)
HGB BLD-MCNC: 10.9 G/DL (ref 12–16)
HGB BLD-MCNC: 11 G/DL (ref 12–16)
HGB BLD-MCNC: 11.1 G/DL (ref 12–16)
HGB BLD-MCNC: 11.3 G/DL (ref 12–16)
HGB BLD-MCNC: 11.6 G/DL (ref 12–16)
HGB BLD-MCNC: 11.6 G/DL (ref 12–16)
HGB BLD-MCNC: 12.6 G/DL (ref 12–16)
HGB BLD-MCNC: 12.7 G/DL (ref 12–16)
HGB BLD-MCNC: 12.9 G/DL (ref 12–16)
HGB BLD-MCNC: 13 G/DL (ref 12–16)
HGB BLD-MCNC: 13.5 G/DL (ref 12–16)
HGB BLD-MCNC: 13.5 G/DL (ref 12–16)
HGB BLD-MCNC: 13.8 G/DL (ref 12–16)
HGB BLD-MCNC: 7.3 G/DL (ref 12–16)
HGB BLD-MCNC: 9.1 G/DL (ref 12–16)
HGB BLD-MCNC: 9.6 G/DL (ref 12–16)
HGB BLD-MCNC: 9.7 G/DL (ref 12–16)
HGB BLD-MCNC: 9.8 G/DL (ref 12–16)
HGB UR QL STRIP: NEGATIVE
HYALINE CASTS UR QL AUTO: 0 /LPF
HYALINE CASTS UR QL AUTO: 0 /LPF
HYPOCHROMIA BLD QL SMEAR: ABNORMAL
HYPOCHROMIA BLD QL SMEAR: ABNORMAL
IMM GRANULOCYTES # BLD AUTO: 0.01 K/UL (ref 0–0.04)
IMM GRANULOCYTES # BLD AUTO: 0.02 K/UL (ref 0–0.04)
IMM GRANULOCYTES # BLD AUTO: 0.04 K/UL (ref 0–0.04)
IMM GRANULOCYTES # BLD AUTO: 0.05 K/UL (ref 0–0.04)
IMM GRANULOCYTES # BLD AUTO: 0.06 K/UL (ref 0–0.04)
IMM GRANULOCYTES # BLD AUTO: 0.07 K/UL (ref 0–0.04)
IMM GRANULOCYTES # BLD AUTO: 0.12 K/UL (ref 0–0.04)
IMM GRANULOCYTES # BLD AUTO: 0.16 K/UL (ref 0–0.04)
IMM GRANULOCYTES # BLD AUTO: 0.18 K/UL (ref 0–0.04)
IMM GRANULOCYTES # BLD AUTO: 0.18 K/UL (ref 0–0.04)
IMM GRANULOCYTES # BLD AUTO: 0.21 K/UL (ref 0–0.04)
IMM GRANULOCYTES # BLD AUTO: 0.24 K/UL (ref 0–0.04)
IMM GRANULOCYTES # BLD AUTO: 0.28 K/UL (ref 0–0.04)
IMM GRANULOCYTES # BLD AUTO: ABNORMAL K/UL (ref 0–0.04)
IMM GRANULOCYTES NFR BLD AUTO: 0.3 % (ref 0–0.5)
IMM GRANULOCYTES NFR BLD AUTO: 0.3 % (ref 0–0.5)
IMM GRANULOCYTES NFR BLD AUTO: 0.7 % (ref 0–0.5)
IMM GRANULOCYTES NFR BLD AUTO: 0.8 % (ref 0–0.5)
IMM GRANULOCYTES NFR BLD AUTO: 0.8 % (ref 0–0.5)
IMM GRANULOCYTES NFR BLD AUTO: 0.9 % (ref 0–0.5)
IMM GRANULOCYTES NFR BLD AUTO: 1 % (ref 0–0.5)
IMM GRANULOCYTES NFR BLD AUTO: 1.1 % (ref 0–0.5)
IMM GRANULOCYTES NFR BLD AUTO: 1.1 % (ref 0–0.5)
IMM GRANULOCYTES NFR BLD AUTO: 1.2 % (ref 0–0.5)
IMM GRANULOCYTES NFR BLD AUTO: 1.3 % (ref 0–0.5)
IMM GRANULOCYTES NFR BLD AUTO: 1.6 % (ref 0–0.5)
IMM GRANULOCYTES NFR BLD AUTO: 1.9 % (ref 0–0.5)
IMM GRANULOCYTES NFR BLD AUTO: 2.2 % (ref 0–0.5)
IMM GRANULOCYTES NFR BLD AUTO: 2.3 % (ref 0–0.5)
IMM GRANULOCYTES NFR BLD AUTO: 3 % (ref 0–0.5)
IMM GRANULOCYTES NFR BLD AUTO: 4.1 % (ref 0–0.5)
IMM GRANULOCYTES NFR BLD AUTO: 4.6 % (ref 0–0.5)
IMM GRANULOCYTES NFR BLD AUTO: ABNORMAL % (ref 0–0.5)
INR PPP: 1 (ref 0.8–1.2)
INR PPP: 1.2 (ref 0.8–1.2)
INTERVENTRICULAR SEPTUM: 1.27 CM (ref 0.6–1.1)
IRON SERPL-MCNC: 26 UG/DL (ref 30–160)
KETONES UR QL STRIP: NEGATIVE
LACTATE SERPL-SCNC: 0.9 MMOL/L (ref 0.5–2.2)
LACTATE SERPL-SCNC: 1.4 MMOL/L (ref 0.5–2.2)
LACTATE SERPL-SCNC: 1.4 MMOL/L (ref 0.5–2.2)
LDH SERPL L TO P-CCNC: 0.72 MMOL/L (ref 0.36–1.25)
LEFT ATRIUM SIZE: 3.19 CM
LEFT INTERNAL DIMENSION IN SYSTOLE: 2.69 CM (ref 2.1–4)
LEFT VENTRICLE DIASTOLIC VOLUME INDEX: 26.63 ML/M2
LEFT VENTRICLE DIASTOLIC VOLUME: 68.96 ML
LEFT VENTRICLE MASS INDEX: 58 G/M2
LEFT VENTRICLE SYSTOLIC VOLUME INDEX: 10.3 ML/M2
LEFT VENTRICLE SYSTOLIC VOLUME: 26.8 ML
LEFT VENTRICULAR INTERNAL DIMENSION IN DIASTOLE: 3.97 CM (ref 3.5–6)
LEFT VENTRICULAR MASS: 149.72 G
LEUKOCYTE ESTERASE UR QL STRIP: ABNORMAL
LEUKOCYTE ESTERASE UR QL STRIP: ABNORMAL
LEUKOCYTE ESTERASE UR QL STRIP: NEGATIVE
LIPASE SERPL-CCNC: 160 U/L (ref 4–60)
LIPASE SERPL-CCNC: 18 U/L (ref 4–60)
LYMPHOCYTES # BLD AUTO: 0.5 K/UL (ref 1–4.8)
LYMPHOCYTES # BLD AUTO: 0.6 K/UL (ref 1–4.8)
LYMPHOCYTES # BLD AUTO: 0.6 K/UL (ref 1–4.8)
LYMPHOCYTES # BLD AUTO: 0.7 K/UL (ref 1–4.8)
LYMPHOCYTES # BLD AUTO: 0.7 K/UL (ref 1–4.8)
LYMPHOCYTES # BLD AUTO: 0.8 K/UL (ref 1–4.8)
LYMPHOCYTES # BLD AUTO: 0.9 K/UL (ref 1–4.8)
LYMPHOCYTES # BLD AUTO: 0.9 K/UL (ref 1–4.8)
LYMPHOCYTES # BLD AUTO: 1 K/UL (ref 1–4.8)
LYMPHOCYTES # BLD AUTO: 1 K/UL (ref 1–4.8)
LYMPHOCYTES # BLD AUTO: 1.1 K/UL (ref 1–4.8)
LYMPHOCYTES # BLD AUTO: 1.1 K/UL (ref 1–4.8)
LYMPHOCYTES # BLD AUTO: 1.2 K/UL (ref 1–4.8)
LYMPHOCYTES # BLD AUTO: 1.4 K/UL (ref 1–4.8)
LYMPHOCYTES # BLD AUTO: ABNORMAL K/UL (ref 1–4.8)
LYMPHOCYTES NFR BLD: 11 % (ref 18–48)
LYMPHOCYTES NFR BLD: 11.1 % (ref 18–48)
LYMPHOCYTES NFR BLD: 13.7 % (ref 18–48)
LYMPHOCYTES NFR BLD: 13.7 % (ref 18–48)
LYMPHOCYTES NFR BLD: 13.9 % (ref 18–48)
LYMPHOCYTES NFR BLD: 14.3 % (ref 18–48)
LYMPHOCYTES NFR BLD: 15.1 % (ref 18–48)
LYMPHOCYTES NFR BLD: 17.9 % (ref 18–48)
LYMPHOCYTES NFR BLD: 18.4 % (ref 18–48)
LYMPHOCYTES NFR BLD: 19.5 % (ref 18–48)
LYMPHOCYTES NFR BLD: 41.3 % (ref 18–48)
LYMPHOCYTES NFR BLD: 5 % (ref 18–48)
LYMPHOCYTES NFR BLD: 7.9 % (ref 18–48)
LYMPHOCYTES NFR BLD: 8 % (ref 18–48)
LYMPHOCYTES NFR BLD: 8.1 % (ref 18–48)
LYMPHOCYTES NFR BLD: 8.3 % (ref 18–48)
LYMPHOCYTES NFR BLD: 9.4 % (ref 18–48)
LYMPHOCYTES NFR BLD: 9.4 % (ref 18–48)
LYMPHOCYTES NFR BLD: 9.7 % (ref 18–48)
MAGNESIUM SERPL-MCNC: 1.5 MG/DL (ref 1.6–2.6)
MAGNESIUM SERPL-MCNC: 1.6 MG/DL (ref 1.6–2.6)
MAGNESIUM SERPL-MCNC: 1.6 MG/DL (ref 1.6–2.6)
MAGNESIUM SERPL-MCNC: 1.8 MG/DL (ref 1.6–2.6)
MAGNESIUM SERPL-MCNC: 1.9 MG/DL (ref 1.6–2.6)
MAGNESIUM SERPL-MCNC: 2 MG/DL (ref 1.6–2.6)
MAGNESIUM SERPL-MCNC: 2.1 MG/DL (ref 1.6–2.6)
MAGNESIUM SERPL-MCNC: 2.1 MG/DL (ref 1.6–2.6)
MAGNESIUM SERPL-MCNC: 2.2 MG/DL (ref 1.6–2.6)
MAGNESIUM SERPL-MCNC: 2.3 MG/DL (ref 1.6–2.6)
MAGNESIUM SERPL-MCNC: 2.7 MG/DL (ref 1.6–2.6)
MAGNESIUM SERPL-MCNC: 2.7 MG/DL (ref 1.6–2.6)
MAGNESIUM SERPL-MCNC: 2.9 MG/DL (ref 1.6–2.6)
MAGNESIUM SERPL-MCNC: 2.9 MG/DL (ref 1.6–2.6)
MCH RBC QN AUTO: 23.8 PG (ref 27–31)
MCH RBC QN AUTO: 23.9 PG (ref 27–31)
MCH RBC QN AUTO: 24 PG (ref 27–31)
MCH RBC QN AUTO: 24.6 PG (ref 27–31)
MCH RBC QN AUTO: 28.4 PG (ref 27–31)
MCH RBC QN AUTO: 28.6 PG (ref 27–31)
MCH RBC QN AUTO: 28.6 PG (ref 27–31)
MCH RBC QN AUTO: 28.9 PG (ref 27–31)
MCH RBC QN AUTO: 29 PG (ref 27–31)
MCH RBC QN AUTO: 29.3 PG (ref 27–31)
MCH RBC QN AUTO: 29.4 PG (ref 27–31)
MCH RBC QN AUTO: 29.5 PG (ref 27–31)
MCH RBC QN AUTO: 29.6 PG (ref 27–31)
MCH RBC QN AUTO: 29.7 PG (ref 27–31)
MCH RBC QN AUTO: 29.9 PG (ref 27–31)
MCH RBC QN AUTO: 29.9 PG (ref 27–31)
MCH RBC QN AUTO: 30.1 PG (ref 27–31)
MCH RBC QN AUTO: 30.2 PG (ref 27–31)
MCH RBC QN AUTO: 30.5 PG (ref 27–31)
MCHC RBC AUTO-ENTMCNC: 30.4 G/DL (ref 32–36)
MCHC RBC AUTO-ENTMCNC: 30.5 G/DL (ref 32–36)
MCHC RBC AUTO-ENTMCNC: 30.6 G/DL (ref 32–36)
MCHC RBC AUTO-ENTMCNC: 30.8 G/DL (ref 32–36)
MCHC RBC AUTO-ENTMCNC: 30.9 G/DL (ref 32–36)
MCHC RBC AUTO-ENTMCNC: 31 G/DL (ref 32–36)
MCHC RBC AUTO-ENTMCNC: 31.1 G/DL (ref 32–36)
MCHC RBC AUTO-ENTMCNC: 31.3 G/DL (ref 32–36)
MCHC RBC AUTO-ENTMCNC: 31.5 G/DL (ref 32–36)
MCHC RBC AUTO-ENTMCNC: 31.5 G/DL (ref 32–36)
MCHC RBC AUTO-ENTMCNC: 32 G/DL (ref 32–36)
MCHC RBC AUTO-ENTMCNC: 32.2 G/DL (ref 32–36)
MCHC RBC AUTO-ENTMCNC: 32.3 G/DL (ref 32–36)
MCHC RBC AUTO-ENTMCNC: 32.4 G/DL (ref 32–36)
MCV RBC AUTO: 77 FL (ref 82–98)
MCV RBC AUTO: 78 FL (ref 82–98)
MCV RBC AUTO: 79 FL (ref 82–98)
MCV RBC AUTO: 80 FL (ref 82–98)
MCV RBC AUTO: 91 FL (ref 82–98)
MCV RBC AUTO: 92 FL (ref 82–98)
MCV RBC AUTO: 93 FL (ref 82–98)
MCV RBC AUTO: 94 FL (ref 82–98)
MCV RBC AUTO: 95 FL (ref 82–98)
MCV RBC AUTO: 97 FL (ref 82–98)
METAMYELOCYTES NFR BLD MANUAL: 1 %
MICROSCOPIC COMMENT: ABNORMAL
MICROSCOPIC COMMENT: ABNORMAL
MODE: ABNORMAL
MODE: NORMAL
MONOCYTES # BLD AUTO: 0.4 K/UL (ref 0.3–1)
MONOCYTES # BLD AUTO: 0.5 K/UL (ref 0.3–1)
MONOCYTES # BLD AUTO: 0.6 K/UL (ref 0.3–1)
MONOCYTES # BLD AUTO: 0.7 K/UL (ref 0.3–1)
MONOCYTES # BLD AUTO: 0.7 K/UL (ref 0.3–1)
MONOCYTES # BLD AUTO: 0.8 K/UL (ref 0.3–1)
MONOCYTES # BLD AUTO: 0.8 K/UL (ref 0.3–1)
MONOCYTES # BLD AUTO: 0.9 K/UL (ref 0.3–1)
MONOCYTES # BLD AUTO: ABNORMAL K/UL (ref 0.3–1)
MONOCYTES NFR BLD: 10.5 % (ref 4–15)
MONOCYTES NFR BLD: 10.6 % (ref 4–15)
MONOCYTES NFR BLD: 12.4 % (ref 4–15)
MONOCYTES NFR BLD: 13.3 % (ref 4–15)
MONOCYTES NFR BLD: 13.7 % (ref 4–15)
MONOCYTES NFR BLD: 15.3 % (ref 4–15)
MONOCYTES NFR BLD: 21.7 % (ref 4–15)
MONOCYTES NFR BLD: 4 % (ref 4–15)
MONOCYTES NFR BLD: 5.6 % (ref 4–15)
MONOCYTES NFR BLD: 6 % (ref 4–15)
MONOCYTES NFR BLD: 6.3 % (ref 4–15)
MONOCYTES NFR BLD: 6.7 % (ref 4–15)
MONOCYTES NFR BLD: 7.8 % (ref 4–15)
MONOCYTES NFR BLD: 8.1 % (ref 4–15)
MONOCYTES NFR BLD: 8.3 % (ref 4–15)
MONOCYTES NFR BLD: 8.4 % (ref 4–15)
MONOCYTES NFR BLD: 8.4 % (ref 4–15)
MONOCYTES NFR BLD: 9.1 % (ref 4–15)
MONOCYTES NFR BLD: 9.9 % (ref 4–15)
MYELOCYTES NFR BLD MANUAL: 1 %
NEUTROPHILS # BLD AUTO: 1 K/UL (ref 1.8–7.7)
NEUTROPHILS # BLD AUTO: 3.6 K/UL (ref 1.8–7.7)
NEUTROPHILS # BLD AUTO: 3.6 K/UL (ref 1.8–7.7)
NEUTROPHILS # BLD AUTO: 4.1 K/UL (ref 1.8–7.7)
NEUTROPHILS # BLD AUTO: 4.2 K/UL (ref 1.8–7.7)
NEUTROPHILS # BLD AUTO: 4.2 K/UL (ref 1.8–7.7)
NEUTROPHILS # BLD AUTO: 4.3 K/UL (ref 1.8–7.7)
NEUTROPHILS # BLD AUTO: 4.6 K/UL (ref 1.8–7.7)
NEUTROPHILS # BLD AUTO: 4.7 K/UL (ref 1.8–7.7)
NEUTROPHILS # BLD AUTO: 4.9 K/UL (ref 1.8–7.7)
NEUTROPHILS # BLD AUTO: 5.3 K/UL (ref 1.8–7.7)
NEUTROPHILS # BLD AUTO: 5.6 K/UL (ref 1.8–7.7)
NEUTROPHILS # BLD AUTO: 6.1 K/UL (ref 1.8–7.7)
NEUTROPHILS # BLD AUTO: 6.2 K/UL (ref 1.8–7.7)
NEUTROPHILS # BLD AUTO: 7 K/UL (ref 1.8–7.7)
NEUTROPHILS # BLD AUTO: 7.9 K/UL (ref 1.8–7.7)
NEUTROPHILS NFR BLD: 33.6 % (ref 38–73)
NEUTROPHILS NFR BLD: 61.5 % (ref 38–73)
NEUTROPHILS NFR BLD: 65.4 % (ref 38–73)
NEUTROPHILS NFR BLD: 67.3 % (ref 38–73)
NEUTROPHILS NFR BLD: 68.2 % (ref 38–73)
NEUTROPHILS NFR BLD: 68.8 % (ref 38–73)
NEUTROPHILS NFR BLD: 70.6 % (ref 38–73)
NEUTROPHILS NFR BLD: 73 % (ref 38–73)
NEUTROPHILS NFR BLD: 73.3 % (ref 38–73)
NEUTROPHILS NFR BLD: 75.4 % (ref 38–73)
NEUTROPHILS NFR BLD: 77.3 % (ref 38–73)
NEUTROPHILS NFR BLD: 78.6 % (ref 38–73)
NEUTROPHILS NFR BLD: 80 % (ref 38–73)
NEUTROPHILS NFR BLD: 81 % (ref 38–73)
NEUTROPHILS NFR BLD: 81.4 % (ref 38–73)
NEUTROPHILS NFR BLD: 81.9 % (ref 38–73)
NEUTROPHILS NFR BLD: 82.4 % (ref 38–73)
NEUTROPHILS NFR BLD: 83.7 % (ref 38–73)
NEUTROPHILS NFR BLD: 83.7 % (ref 38–73)
NEUTS BAND NFR BLD MANUAL: 7 %
NITRITE UR QL STRIP: NEGATIVE
NON-SQ EPI CELLS #/AREA URNS AUTO: 3 /HPF
NRBC BLD-RTO: 0 /100 WBC
NRBC BLD-RTO: 1 /100 WBC
NRBC BLD-RTO: 1 /100 WBC
OHS QRS DURATION: 82 MS
OHS QRS DURATION: 82 MS
OHS QTC CALCULATION: 497 MS
OHS QTC CALCULATION: 541 MS
OVALOCYTES BLD QL SMEAR: ABNORMAL
OVALOCYTES BLD QL SMEAR: ABNORMAL
PCO2 BLDA: 26.9 MMHG (ref 35–45)
PCO2 BLDA: 33.3 MMHG (ref 35–45)
PH SMN: 7.29 [PH] (ref 7.35–7.45)
PH SMN: 7.34 [PH] (ref 7.35–7.45)
PH UR STRIP: 5 [PH] (ref 5–8)
PH UR STRIP: 6 [PH] (ref 5–8)
PH UR STRIP: 7 [PH] (ref 5–8)
PHOSPHATE SERPL-MCNC: 2.2 MG/DL (ref 2.7–4.5)
PHOSPHATE SERPL-MCNC: 2.4 MG/DL (ref 2.7–4.5)
PHOSPHATE SERPL-MCNC: 2.6 MG/DL (ref 2.7–4.5)
PHOSPHATE SERPL-MCNC: 3.5 MG/DL (ref 2.7–4.5)
PHOSPHATE SERPL-MCNC: 4 MG/DL (ref 2.7–4.5)
PHOSPHATE SERPL-MCNC: 4.2 MG/DL (ref 2.7–4.5)
PHOSPHATE SERPL-MCNC: 4.2 MG/DL (ref 2.7–4.5)
PHOSPHATE SERPL-MCNC: 4.3 MG/DL (ref 2.7–4.5)
PHOSPHATE SERPL-MCNC: 4.3 MG/DL (ref 2.7–4.5)
PHOSPHATE SERPL-MCNC: 4.5 MG/DL (ref 2.7–4.5)
PHOSPHATE SERPL-MCNC: 5.3 MG/DL (ref 2.7–4.5)
PHOSPHATE SERPL-MCNC: 5.4 MG/DL (ref 2.7–4.5)
PHOSPHATE SERPL-MCNC: 5.7 MG/DL (ref 2.7–4.5)
PLATELET # BLD AUTO: 213 K/UL (ref 150–450)
PLATELET # BLD AUTO: 219 K/UL (ref 150–450)
PLATELET # BLD AUTO: 227 K/UL (ref 150–450)
PLATELET # BLD AUTO: 232 K/UL (ref 150–450)
PLATELET # BLD AUTO: 266 K/UL (ref 150–450)
PLATELET # BLD AUTO: 267 K/UL (ref 150–450)
PLATELET # BLD AUTO: 297 K/UL (ref 150–450)
PLATELET # BLD AUTO: 304 K/UL (ref 150–450)
PLATELET # BLD AUTO: 306 K/UL (ref 150–450)
PLATELET # BLD AUTO: 309 K/UL (ref 150–450)
PLATELET # BLD AUTO: 328 K/UL (ref 150–450)
PLATELET # BLD AUTO: 329 K/UL (ref 150–450)
PLATELET # BLD AUTO: 332 K/UL (ref 150–450)
PLATELET # BLD AUTO: 349 K/UL (ref 150–450)
PLATELET # BLD AUTO: 354 K/UL (ref 150–450)
PLATELET # BLD AUTO: 374 K/UL (ref 150–450)
PLATELET # BLD AUTO: 398 K/UL (ref 150–450)
PLATELET BLD QL SMEAR: ABNORMAL
PLATELET BLD QL SMEAR: ABNORMAL
PMV BLD AUTO: 10 FL (ref 9.2–12.9)
PMV BLD AUTO: 10 FL (ref 9.2–12.9)
PMV BLD AUTO: 10.1 FL (ref 9.2–12.9)
PMV BLD AUTO: 10.2 FL (ref 9.2–12.9)
PMV BLD AUTO: 10.4 FL (ref 9.2–12.9)
PMV BLD AUTO: 10.4 FL (ref 9.2–12.9)
PMV BLD AUTO: 10.5 FL (ref 9.2–12.9)
PMV BLD AUTO: 10.8 FL (ref 9.2–12.9)
PMV BLD AUTO: 10.9 FL (ref 9.2–12.9)
PMV BLD AUTO: 11 FL (ref 9.2–12.9)
PMV BLD AUTO: 11.3 FL (ref 9.2–12.9)
PMV BLD AUTO: 9.2 FL (ref 9.2–12.9)
PMV BLD AUTO: 9.6 FL (ref 9.2–12.9)
PMV BLD AUTO: 9.7 FL (ref 9.2–12.9)
PO2 BLDA: 29 MMHG (ref 40–60)
PO2 BLDA: 73 MMHG (ref 80–100)
POC BE: -11 MMOL/L
POC BE: -11 MMOL/L
POC IONIZED CALCIUM: 1.45 MMOL/L (ref 1.06–1.42)
POC SATURATED O2: 48 % (ref 95–100)
POC SATURATED O2: 94 % (ref 95–100)
POC TCO2: 15 MMOL/L (ref 23–27)
POC TCO2: 17 MMOL/L (ref 24–29)
POCT GLUCOSE: 109 MG/DL (ref 70–110)
POIKILOCYTOSIS BLD QL SMEAR: SLIGHT
POLYCHROMASIA BLD QL SMEAR: ABNORMAL
POLYCHROMASIA BLD QL SMEAR: ABNORMAL
POTASSIUM BLD-SCNC: 4.6 MMOL/L (ref 3.5–5.1)
POTASSIUM SERPL-SCNC: 3.5 MMOL/L (ref 3.5–5.1)
POTASSIUM SERPL-SCNC: 3.6 MMOL/L (ref 3.5–5.1)
POTASSIUM SERPL-SCNC: 3.7 MMOL/L (ref 3.5–5.1)
POTASSIUM SERPL-SCNC: 3.8 MMOL/L (ref 3.5–5.1)
POTASSIUM SERPL-SCNC: 3.8 MMOL/L (ref 3.5–5.1)
POTASSIUM SERPL-SCNC: 3.9 MMOL/L (ref 3.5–5.1)
POTASSIUM SERPL-SCNC: 3.9 MMOL/L (ref 3.5–5.1)
POTASSIUM SERPL-SCNC: 4.3 MMOL/L (ref 3.5–5.1)
POTASSIUM SERPL-SCNC: 4.4 MMOL/L (ref 3.5–5.1)
POTASSIUM SERPL-SCNC: 4.5 MMOL/L (ref 3.5–5.1)
POTASSIUM SERPL-SCNC: 4.6 MMOL/L (ref 3.5–5.1)
POTASSIUM SERPL-SCNC: 4.7 MMOL/L (ref 3.5–5.1)
PROMYELOCYTES NFR BLD MANUAL: 1 %
PROT SERPL-MCNC: 5.8 G/DL (ref 6–8.4)
PROT SERPL-MCNC: 5.9 G/DL (ref 6–8.4)
PROT SERPL-MCNC: 6 G/DL (ref 6–8.4)
PROT SERPL-MCNC: 6.1 G/DL (ref 6–8.4)
PROT SERPL-MCNC: 6.2 G/DL (ref 6–8.4)
PROT SERPL-MCNC: 6.3 G/DL (ref 6–8.4)
PROT SERPL-MCNC: 6.3 G/DL (ref 6–8.4)
PROT SERPL-MCNC: 6.4 G/DL (ref 6–8.4)
PROT SERPL-MCNC: 6.4 G/DL (ref 6–8.4)
PROT SERPL-MCNC: 6.6 G/DL (ref 6–8.4)
PROT SERPL-MCNC: 6.8 G/DL (ref 6–8.4)
PROT SERPL-MCNC: 7.2 G/DL (ref 6–8.4)
PROT SERPL-MCNC: 7.2 G/DL (ref 6–8.4)
PROT SERPL-MCNC: 7.3 G/DL (ref 6–8.4)
PROT UR QL STRIP: ABNORMAL
PROT UR QL STRIP: ABNORMAL
PROT UR QL STRIP: NEGATIVE
PROTHROMBIN TIME: 11.3 SEC (ref 9–12.5)
PROTHROMBIN TIME: 13 SEC (ref 9–12.5)
RBC # BLD AUTO: 2.49 M/UL (ref 4–5.4)
RBC # BLD AUTO: 3.07 M/UL (ref 4–5.4)
RBC # BLD AUTO: 3.15 M/UL (ref 4–5.4)
RBC # BLD AUTO: 3.22 M/UL (ref 4–5.4)
RBC # BLD AUTO: 3.25 M/UL (ref 4–5.4)
RBC # BLD AUTO: 3.52 M/UL (ref 4–5.4)
RBC # BLD AUTO: 3.67 M/UL (ref 4–5.4)
RBC # BLD AUTO: 3.77 M/UL (ref 4–5.4)
RBC # BLD AUTO: 3.78 M/UL (ref 4–5.4)
RBC # BLD AUTO: 3.79 M/UL (ref 4–5.4)
RBC # BLD AUTO: 4.05 M/UL (ref 4–5.4)
RBC # BLD AUTO: 4.05 M/UL (ref 4–5.4)
RBC # BLD AUTO: 4.31 M/UL (ref 4–5.4)
RBC # BLD AUTO: 4.39 M/UL (ref 4–5.4)
RBC # BLD AUTO: 4.44 M/UL (ref 4–5.4)
RBC # BLD AUTO: 5.28 M/UL (ref 4–5.4)
RBC # BLD AUTO: 5.63 M/UL (ref 4–5.4)
RBC # BLD AUTO: 5.65 M/UL (ref 4–5.4)
RBC # BLD AUTO: 5.81 M/UL (ref 4–5.4)
RBC #/AREA URNS AUTO: 2 /HPF (ref 0–4)
RBC #/AREA URNS AUTO: 3 /HPF (ref 0–4)
SAMPLE: ABNORMAL
SAMPLE: ABNORMAL
SAMPLE: NORMAL
SARS-COV-2 RDRP RESP QL NAA+PROBE: NEGATIVE
SATURATED IRON: 7 % (ref 20–50)
SCHISTOCYTES BLD QL SMEAR: ABNORMAL
SCHISTOCYTES BLD QL SMEAR: PRESENT
SINUS: 3.24 CM
SITE: ABNORMAL
SITE: ABNORMAL
SITE: NORMAL
SODIUM BLD-SCNC: 134 MMOL/L (ref 136–145)
SODIUM SERPL-SCNC: 132 MMOL/L (ref 136–145)
SODIUM SERPL-SCNC: 133 MMOL/L (ref 136–145)
SODIUM SERPL-SCNC: 134 MMOL/L (ref 136–145)
SODIUM SERPL-SCNC: 134 MMOL/L (ref 136–145)
SODIUM SERPL-SCNC: 135 MMOL/L (ref 136–145)
SODIUM SERPL-SCNC: 135 MMOL/L (ref 136–145)
SODIUM SERPL-SCNC: 136 MMOL/L (ref 136–145)
SODIUM SERPL-SCNC: 137 MMOL/L (ref 136–145)
SODIUM SERPL-SCNC: 138 MMOL/L (ref 136–145)
SODIUM SERPL-SCNC: 138 MMOL/L (ref 136–145)
SODIUM SERPL-SCNC: 139 MMOL/L (ref 136–145)
SODIUM SERPL-SCNC: 140 MMOL/L (ref 136–145)
SODIUM SERPL-SCNC: 140 MMOL/L (ref 136–145)
SODIUM SERPL-SCNC: 141 MMOL/L (ref 136–145)
SODIUM SERPL-SCNC: 142 MMOL/L (ref 136–145)
SODIUM SERPL-SCNC: 143 MMOL/L (ref 136–145)
SP GR UR STRIP: 1.02 (ref 1–1.03)
SP GR UR STRIP: 1.02 (ref 1–1.03)
SP GR UR STRIP: <=1.005 (ref 1–1.03)
SP02: 99
SP02: 99
SPECIMEN OUTDATE: NORMAL
SPECIMEN OUTDATE: NORMAL
SPHEROCYTES BLD QL SMEAR: ABNORMAL
SPHEROCYTES BLD QL SMEAR: ABNORMAL
SQUAMOUS #/AREA URNS AUTO: 14 /HPF
SQUAMOUS #/AREA URNS AUTO: 19 /HPF
STJ: 3.11 CM
TDI LATERAL: 0.1 M/S
TDI SEPTAL: 0.07 M/S
TDI: 0.09 M/S
TOTAL IRON BINDING CAPACITY: 383 UG/DL (ref 250–450)
TRANSFERRIN SERPL-MCNC: 259 MG/DL (ref 200–375)
TRICUSPID ANNULAR PLANE SYSTOLIC EXCURSION: 1.76 CM
TSH SERPL DL<=0.005 MIU/L-ACNC: 1.87 UIU/ML (ref 0.4–4)
URN SPEC COLLECT METH UR: ABNORMAL
WBC # BLD AUTO: 2.86 K/UL (ref 3.9–12.7)
WBC # BLD AUTO: 5.31 K/UL (ref 3.9–12.7)
WBC # BLD AUTO: 5.48 K/UL (ref 3.9–12.7)
WBC # BLD AUTO: 5.54 K/UL (ref 3.9–12.7)
WBC # BLD AUTO: 5.56 K/UL (ref 3.9–12.7)
WBC # BLD AUTO: 5.56 K/UL (ref 3.9–12.7)
WBC # BLD AUTO: 5.83 K/UL (ref 3.9–12.7)
WBC # BLD AUTO: 5.96 K/UL (ref 3.9–12.7)
WBC # BLD AUTO: 5.99 K/UL (ref 3.9–12.7)
WBC # BLD AUTO: 6.05 K/UL (ref 3.9–12.7)
WBC # BLD AUTO: 6.08 K/UL (ref 3.9–12.7)
WBC # BLD AUTO: 6.91 K/UL (ref 3.9–12.7)
WBC # BLD AUTO: 7.07 K/UL (ref 3.9–12.7)
WBC # BLD AUTO: 7.22 K/UL (ref 3.9–12.7)
WBC # BLD AUTO: 7.36 K/UL (ref 3.9–12.7)
WBC # BLD AUTO: 7.54 K/UL (ref 3.9–12.7)
WBC # BLD AUTO: 7.94 K/UL (ref 3.9–12.7)
WBC # BLD AUTO: 8.75 K/UL (ref 3.9–12.7)
WBC # BLD AUTO: 9.48 K/UL (ref 3.9–12.7)
WBC #/AREA URNS AUTO: 40 /HPF (ref 0–5)
WBC #/AREA URNS AUTO: 45 /HPF (ref 0–5)
WBC CLUMPS UR QL AUTO: ABNORMAL
Z-SCORE OF LEFT VENTRICULAR DIMENSION IN END DIASTOLE: -15.07
Z-SCORE OF LEFT VENTRICULAR DIMENSION IN END SYSTOLE: -10.79

## 2024-01-01 PROCEDURE — 85025 COMPLETE CBC W/AUTO DIFF WBC: CPT | Mod: 91 | Performed by: PHYSICIAN ASSISTANT

## 2024-01-01 PROCEDURE — 99233 SBSQ HOSP IP/OBS HIGH 50: CPT | Mod: ,,, | Performed by: RADIOLOGY

## 2024-01-01 PROCEDURE — 25000003 PHARM REV CODE 250: Performed by: NURSE ANESTHETIST, CERTIFIED REGISTERED

## 2024-01-01 PROCEDURE — 77417 THER RADIOLOGY PORT IMAGE(S): CPT | Performed by: RADIOLOGY

## 2024-01-01 PROCEDURE — 25000003 PHARM REV CODE 250: Performed by: STUDENT IN AN ORGANIZED HEALTH CARE EDUCATION/TRAINING PROGRAM

## 2024-01-01 PROCEDURE — 99499 UNLISTED E&M SERVICE: CPT | Mod: S$PBB,,, | Performed by: DENTIST

## 2024-01-01 PROCEDURE — 99215 OFFICE O/P EST HI 40 MIN: CPT | Mod: PBBFAC,PO

## 2024-01-01 PROCEDURE — 20600001 HC STEP DOWN PRIVATE ROOM

## 2024-01-01 PROCEDURE — 85025 COMPLETE CBC W/AUTO DIFF WBC: CPT | Performed by: STUDENT IN AN ORGANIZED HEALTH CARE EDUCATION/TRAINING PROGRAM

## 2024-01-01 PROCEDURE — 25000003 PHARM REV CODE 250: Performed by: NURSE PRACTITIONER

## 2024-01-01 PROCEDURE — 3008F BODY MASS INDEX DOCD: CPT | Mod: CPTII,,, | Performed by: INTERNAL MEDICINE

## 2024-01-01 PROCEDURE — 93010 ELECTROCARDIOGRAM REPORT: CPT | Mod: ,,, | Performed by: INTERNAL MEDICINE

## 2024-01-01 PROCEDURE — 63600175 PHARM REV CODE 636 W HCPCS: Performed by: STUDENT IN AN ORGANIZED HEALTH CARE EDUCATION/TRAINING PROGRAM

## 2024-01-01 PROCEDURE — 82330 ASSAY OF CALCIUM: CPT

## 2024-01-01 PROCEDURE — 3044F HG A1C LEVEL LT 7.0%: CPT | Mod: CPTII,,,

## 2024-01-01 PROCEDURE — 4010F ACE/ARB THERAPY RXD/TAKEN: CPT | Mod: CPTII,95,, | Performed by: NURSE PRACTITIONER

## 2024-01-01 PROCEDURE — 0T9330Z DRAINAGE OF RIGHT KIDNEY PELVIS WITH DRAINAGE DEVICE, PERCUTANEOUS APPROACH: ICD-10-PCS | Performed by: RADIOLOGY

## 2024-01-01 PROCEDURE — 97165 OT EVAL LOW COMPLEX 30 MIN: CPT

## 2024-01-01 PROCEDURE — 84100 ASSAY OF PHOSPHORUS: CPT | Performed by: STUDENT IN AN ORGANIZED HEALTH CARE EDUCATION/TRAINING PROGRAM

## 2024-01-01 PROCEDURE — 77300 RADIATION THERAPY DOSE PLAN: CPT | Mod: TC | Performed by: RADIOLOGY

## 2024-01-01 PROCEDURE — 99999 PR PBB SHADOW E&M-EST. PATIENT-LVL V: CPT | Mod: PBBFAC,,, | Performed by: NURSE PRACTITIONER

## 2024-01-01 PROCEDURE — 25000003 PHARM REV CODE 250: Mod: PN | Performed by: INTERNAL MEDICINE

## 2024-01-01 PROCEDURE — 99233 SBSQ HOSP IP/OBS HIGH 50: CPT | Mod: 95,,, | Performed by: STUDENT IN AN ORGANIZED HEALTH CARE EDUCATION/TRAINING PROGRAM

## 2024-01-01 PROCEDURE — 99215 OFFICE O/P EST HI 40 MIN: CPT | Mod: PBBFAC,PN | Performed by: INTERNAL MEDICINE

## 2024-01-01 PROCEDURE — 25000003 PHARM REV CODE 250

## 2024-01-01 PROCEDURE — 4010F ACE/ARB THERAPY RXD/TAKEN: CPT | Mod: CPTII,95,,

## 2024-01-01 PROCEDURE — 99215 OFFICE O/P EST HI 40 MIN: CPT | Mod: S$PBB,,,

## 2024-01-01 PROCEDURE — 83735 ASSAY OF MAGNESIUM: CPT | Performed by: STUDENT IN AN ORGANIZED HEALTH CARE EDUCATION/TRAINING PROGRAM

## 2024-01-01 PROCEDURE — 90834 PSYTX W PT 45 MINUTES: CPT | Mod: AH,HB,,

## 2024-01-01 PROCEDURE — 1159F MED LIST DOCD IN RCRD: CPT | Mod: CPTII,,, | Performed by: OBSTETRICS & GYNECOLOGY

## 2024-01-01 PROCEDURE — 77412 RADIATION TX DELIVERY LVL 3: CPT | Performed by: RADIOLOGY

## 2024-01-01 PROCEDURE — 86140 C-REACTIVE PROTEIN: CPT | Performed by: STUDENT IN AN ORGANIZED HEALTH CARE EDUCATION/TRAINING PROGRAM

## 2024-01-01 PROCEDURE — 85610 PROTHROMBIN TIME: CPT | Performed by: PHYSICIAN ASSISTANT

## 2024-01-01 PROCEDURE — 36415 COLL VENOUS BLD VENIPUNCTURE: CPT | Performed by: STUDENT IN AN ORGANIZED HEALTH CARE EDUCATION/TRAINING PROGRAM

## 2024-01-01 PROCEDURE — 97530 THERAPEUTIC ACTIVITIES: CPT

## 2024-01-01 PROCEDURE — 4010F ACE/ARB THERAPY RXD/TAKEN: CPT | Mod: CPTII,,,

## 2024-01-01 PROCEDURE — 99999 PR PBB SHADOW E&M-EST. PATIENT-LVL V: CPT | Mod: PBBFAC,,, | Performed by: INTERNAL MEDICINE

## 2024-01-01 PROCEDURE — 3044F HG A1C LEVEL LT 7.0%: CPT | Mod: CPTII,,, | Performed by: OBSTETRICS & GYNECOLOGY

## 2024-01-01 PROCEDURE — 99214 OFFICE O/P EST MOD 30 MIN: CPT | Mod: S$PBB,,, | Performed by: NURSE PRACTITIONER

## 2024-01-01 PROCEDURE — 25000003 PHARM REV CODE 250: Performed by: PHYSICIAN ASSISTANT

## 2024-01-01 PROCEDURE — 82803 BLOOD GASES ANY COMBINATION: CPT

## 2024-01-01 PROCEDURE — 71000015 HC POSTOP RECOV 1ST HR: Performed by: UROLOGY

## 2024-01-01 PROCEDURE — 77334 RADIATION TREATMENT AID(S): CPT | Mod: TC | Performed by: RADIOLOGY

## 2024-01-01 PROCEDURE — 87086 URINE CULTURE/COLONY COUNT: CPT | Performed by: STUDENT IN AN ORGANIZED HEALTH CARE EDUCATION/TRAINING PROGRAM

## 2024-01-01 PROCEDURE — D9220A PRA ANESTHESIA: Mod: ANES,,, | Performed by: STUDENT IN AN ORGANIZED HEALTH CARE EDUCATION/TRAINING PROGRAM

## 2024-01-01 PROCEDURE — 3077F SYST BP >= 140 MM HG: CPT | Mod: CPTII,,, | Performed by: INTERNAL MEDICINE

## 2024-01-01 PROCEDURE — 3044F HG A1C LEVEL LT 7.0%: CPT | Mod: CPTII,95,,

## 2024-01-01 PROCEDURE — 99233 SBSQ HOSP IP/OBS HIGH 50: CPT | Mod: ,,, | Performed by: INTERNAL MEDICINE

## 2024-01-01 PROCEDURE — 84132 ASSAY OF SERUM POTASSIUM: CPT

## 2024-01-01 PROCEDURE — 78815 PET IMAGE W/CT SKULL-THIGH: CPT | Mod: TC,PN

## 2024-01-01 PROCEDURE — 99215 OFFICE O/P EST HI 40 MIN: CPT | Mod: PBBFAC,PN | Performed by: NURSE PRACTITIONER

## 2024-01-01 PROCEDURE — 80053 COMPREHEN METABOLIC PANEL: CPT | Performed by: STUDENT IN AN ORGANIZED HEALTH CARE EDUCATION/TRAINING PROGRAM

## 2024-01-01 PROCEDURE — 81001 URINALYSIS AUTO W/SCOPE: CPT | Performed by: STUDENT IN AN ORGANIZED HEALTH CARE EDUCATION/TRAINING PROGRAM

## 2024-01-01 PROCEDURE — 12000002 HC ACUTE/MED SURGE SEMI-PRIVATE ROOM

## 2024-01-01 PROCEDURE — 99233 SBSQ HOSP IP/OBS HIGH 50: CPT | Mod: ,,, | Performed by: HOSPITALIST

## 2024-01-01 PROCEDURE — 85014 HEMATOCRIT: CPT

## 2024-01-01 PROCEDURE — 77332 RADIATION TREATMENT AID(S): CPT | Mod: 26,,, | Performed by: RADIOLOGY

## 2024-01-01 PROCEDURE — 94761 N-INVAS EAR/PLS OXIMETRY MLT: CPT

## 2024-01-01 PROCEDURE — 80053 COMPREHEN METABOLIC PANEL: CPT | Mod: 91 | Performed by: PHYSICIAN ASSISTANT

## 2024-01-01 PROCEDURE — 99215 OFFICE O/P EST HI 40 MIN: CPT | Mod: S$PBB,,, | Performed by: INTERNAL MEDICINE

## 2024-01-01 PROCEDURE — 74177 CT ABD & PELVIS W/CONTRAST: CPT | Mod: 26,,, | Performed by: RADIOLOGY

## 2024-01-01 PROCEDURE — 77290 THER RAD SIMULAJ FIELD CPLX: CPT | Mod: 26,,, | Performed by: RADIOLOGY

## 2024-01-01 PROCEDURE — 3079F DIAST BP 80-89 MM HG: CPT | Mod: CPTII,,, | Performed by: INTERNAL MEDICINE

## 2024-01-01 PROCEDURE — G2211 COMPLEX E/M VISIT ADD ON: HCPCS | Mod: S$PBB,,, | Performed by: INTERNAL MEDICINE

## 2024-01-01 PROCEDURE — 36415 COLL VENOUS BLD VENIPUNCTURE: CPT | Mod: PN | Performed by: INTERNAL MEDICINE

## 2024-01-01 PROCEDURE — 3077F SYST BP >= 140 MM HG: CPT | Mod: CPTII,,, | Performed by: NURSE PRACTITIONER

## 2024-01-01 PROCEDURE — 99214 OFFICE O/P EST MOD 30 MIN: CPT | Mod: S$PBB,,,

## 2024-01-01 PROCEDURE — 3074F SYST BP LT 130 MM HG: CPT | Mod: CPTII,,, | Performed by: INTERNAL MEDICINE

## 2024-01-01 PROCEDURE — 99215 OFFICE O/P EST HI 40 MIN: CPT | Mod: 95,,, | Performed by: NURSE PRACTITIONER

## 2024-01-01 PROCEDURE — 86901 BLOOD TYPING SEROLOGIC RH(D): CPT | Performed by: HOSPITALIST

## 2024-01-01 PROCEDURE — 25500020 PHARM REV CODE 255: Performed by: INTERNAL MEDICINE

## 2024-01-01 PROCEDURE — 99900035 HC TECH TIME PER 15 MIN (STAT)

## 2024-01-01 PROCEDURE — 85007 BL SMEAR W/DIFF WBC COUNT: CPT | Performed by: PHYSICIAN ASSISTANT

## 2024-01-01 PROCEDURE — 63600175 PHARM REV CODE 636 W HCPCS

## 2024-01-01 PROCEDURE — 4010F ACE/ARB THERAPY RXD/TAKEN: CPT | Mod: CPTII,,, | Performed by: INTERNAL MEDICINE

## 2024-01-01 PROCEDURE — 71000044 HC DOSC ROUTINE RECOVERY FIRST HOUR: Performed by: UROLOGY

## 2024-01-01 PROCEDURE — 63600175 PHARM REV CODE 636 W HCPCS: Performed by: NURSE ANESTHETIST, CERTIFIED REGISTERED

## 2024-01-01 PROCEDURE — 36600 WITHDRAWAL OF ARTERIAL BLOOD: CPT

## 2024-01-01 PROCEDURE — 63600175 PHARM REV CODE 636 W HCPCS: Mod: PN | Performed by: STUDENT IN AN ORGANIZED HEALTH CARE EDUCATION/TRAINING PROGRAM

## 2024-01-01 PROCEDURE — 1111F DSCHRG MED/CURRENT MED MERGE: CPT | Mod: CPTII,,, | Performed by: INTERNAL MEDICINE

## 2024-01-01 PROCEDURE — 93970 EXTREMITY STUDY: CPT | Mod: 26,,, | Performed by: RADIOLOGY

## 2024-01-01 PROCEDURE — 1159F MED LIST DOCD IN RCRD: CPT | Mod: CPTII,,, | Performed by: INTERNAL MEDICINE

## 2024-01-01 PROCEDURE — 3044F HG A1C LEVEL LT 7.0%: CPT | Mod: CPTII,,, | Performed by: INTERNAL MEDICINE

## 2024-01-01 PROCEDURE — 85025 COMPLETE CBC W/AUTO DIFF WBC: CPT | Mod: PN | Performed by: INTERNAL MEDICINE

## 2024-01-01 PROCEDURE — 97535 SELF CARE MNGMENT TRAINING: CPT

## 2024-01-01 PROCEDURE — 77387 GUIDANCE FOR RADJ TX DLVR: CPT | Mod: 26,,, | Performed by: RADIOLOGY

## 2024-01-01 PROCEDURE — 3008F BODY MASS INDEX DOCD: CPT | Mod: CPTII,95,, | Performed by: INTERNAL MEDICINE

## 2024-01-01 PROCEDURE — 0TJB8ZZ INSPECTION OF BLADDER, VIA NATURAL OR ARTIFICIAL OPENING ENDOSCOPIC: ICD-10-PCS | Performed by: UROLOGY

## 2024-01-01 PROCEDURE — 96375 TX/PRO/DX INJ NEW DRUG ADDON: CPT | Mod: PN

## 2024-01-01 PROCEDURE — 3080F DIAST BP >= 90 MM HG: CPT | Mod: CPTII,,,

## 2024-01-01 PROCEDURE — 93005 ELECTROCARDIOGRAM TRACING: CPT

## 2024-01-01 PROCEDURE — 74177 CT ABD & PELVIS W/CONTRAST: CPT | Mod: TC,PO

## 2024-01-01 PROCEDURE — 83605 ASSAY OF LACTIC ACID: CPT | Performed by: STUDENT IN AN ORGANIZED HEALTH CARE EDUCATION/TRAINING PROGRAM

## 2024-01-01 PROCEDURE — 96361 HYDRATE IV INFUSION ADD-ON: CPT

## 2024-01-01 PROCEDURE — 83735 ASSAY OF MAGNESIUM: CPT | Mod: 91 | Performed by: PHYSICIAN ASSISTANT

## 2024-01-01 PROCEDURE — 63600175 PHARM REV CODE 636 W HCPCS: Mod: PN | Performed by: INTERNAL MEDICINE

## 2024-01-01 PROCEDURE — 71260 CT THORAX DX C+: CPT | Mod: 26,,, | Performed by: RADIOLOGY

## 2024-01-01 PROCEDURE — 63600175 PHARM REV CODE 636 W HCPCS: Mod: JZ,JG,PN | Performed by: INTERNAL MEDICINE

## 2024-01-01 PROCEDURE — 0T9430Z DRAINAGE OF LEFT KIDNEY PELVIS WITH DRAINAGE DEVICE, PERCUTANEOUS APPROACH: ICD-10-PCS | Performed by: RADIOLOGY

## 2024-01-01 PROCEDURE — 80053 COMPREHEN METABOLIC PANEL: CPT | Mod: PN | Performed by: INTERNAL MEDICINE

## 2024-01-01 PROCEDURE — 84295 ASSAY OF SERUM SODIUM: CPT

## 2024-01-01 PROCEDURE — 77332 RADIATION TREATMENT AID(S): CPT | Mod: TC | Performed by: RADIOLOGY

## 2024-01-01 PROCEDURE — 84100 ASSAY OF PHOSPHORUS: CPT | Mod: 91 | Performed by: PHYSICIAN ASSISTANT

## 2024-01-01 PROCEDURE — 99214 OFFICE O/P EST MOD 30 MIN: CPT | Mod: PBBFAC,PN | Performed by: INTERNAL MEDICINE

## 2024-01-01 PROCEDURE — 82728 ASSAY OF FERRITIN: CPT | Performed by: STUDENT IN AN ORGANIZED HEALTH CARE EDUCATION/TRAINING PROGRAM

## 2024-01-01 PROCEDURE — 97110 THERAPEUTIC EXERCISES: CPT

## 2024-01-01 PROCEDURE — 96156 HLTH BHV ASSMT/REASSESSMENT: CPT | Mod: AH,HB,, | Performed by: CASE MANAGER/CARE COORDINATOR

## 2024-01-01 PROCEDURE — 99205 OFFICE O/P NEW HI 60 MIN: CPT | Mod: S$PBB,,, | Performed by: OBSTETRICS & GYNECOLOGY

## 2024-01-01 PROCEDURE — 3044F HG A1C LEVEL LT 7.0%: CPT | Mod: CPTII,95,, | Performed by: NURSE PRACTITIONER

## 2024-01-01 PROCEDURE — 3078F DIAST BP <80 MM HG: CPT | Mod: CPTII,,, | Performed by: OBSTETRICS & GYNECOLOGY

## 2024-01-01 PROCEDURE — 99497 ADVNCD CARE PLAN 30 MIN: CPT | Mod: ,,, | Performed by: HOSPITALIST

## 2024-01-01 PROCEDURE — 86901 BLOOD TYPING SEROLOGIC RH(D): CPT | Performed by: PHYSICIAN ASSISTANT

## 2024-01-01 PROCEDURE — 3078F DIAST BP <80 MM HG: CPT | Mod: CPTII,,, | Performed by: INTERNAL MEDICINE

## 2024-01-01 PROCEDURE — 99214 OFFICE O/P EST MOD 30 MIN: CPT | Mod: 95,,, | Performed by: NURSE PRACTITIONER

## 2024-01-01 PROCEDURE — 97168 OT RE-EVAL EST PLAN CARE: CPT

## 2024-01-01 PROCEDURE — A9698 NON-RAD CONTRAST MATERIALNOC: HCPCS | Mod: PO | Performed by: INTERNAL MEDICINE

## 2024-01-01 PROCEDURE — 27100171 HC OXYGEN HIGH FLOW UP TO 24 HOURS

## 2024-01-01 PROCEDURE — 90837 PSYTX W PT 60 MINUTES: CPT | Mod: AH,HB,,

## 2024-01-01 PROCEDURE — 78815 PET IMAGE W/CT SKULL-THIGH: CPT | Mod: 26,PS,, | Performed by: RADIOLOGY

## 2024-01-01 PROCEDURE — 99233 SBSQ HOSP IP/OBS HIGH 50: CPT | Mod: ,,, | Performed by: STUDENT IN AN ORGANIZED HEALTH CARE EDUCATION/TRAINING PROGRAM

## 2024-01-01 PROCEDURE — 99024 POSTOP FOLLOW-UP VISIT: CPT | Mod: ,,, | Performed by: OBSTETRICS & GYNECOLOGY

## 2024-01-01 PROCEDURE — 3075F SYST BP GE 130 - 139MM HG: CPT | Mod: CPTII,,, | Performed by: OBSTETRICS & GYNECOLOGY

## 2024-01-01 PROCEDURE — 73552 X-RAY EXAM OF FEMUR 2/>: CPT | Mod: 26,LT,, | Performed by: RADIOLOGY

## 2024-01-01 PROCEDURE — 96361 HYDRATE IV INFUSION ADD-ON: CPT | Mod: PN

## 2024-01-01 PROCEDURE — 83605 ASSAY OF LACTIC ACID: CPT | Performed by: PHYSICIAN ASSISTANT

## 2024-01-01 PROCEDURE — 52000 CYSTOURETHROSCOPY: CPT | Mod: ,,, | Performed by: UROLOGY

## 2024-01-01 PROCEDURE — 85027 COMPLETE CBC AUTOMATED: CPT | Performed by: PHYSICIAN ASSISTANT

## 2024-01-01 PROCEDURE — 99291 CRITICAL CARE FIRST HOUR: CPT | Mod: ,,,

## 2024-01-01 PROCEDURE — 99215 OFFICE O/P EST HI 40 MIN: CPT | Mod: PBBFAC,PN,25

## 2024-01-01 PROCEDURE — 81003 URINALYSIS AUTO W/O SCOPE: CPT | Mod: PN | Performed by: INTERNAL MEDICINE

## 2024-01-01 PROCEDURE — 83690 ASSAY OF LIPASE: CPT | Performed by: INTERNAL MEDICINE

## 2024-01-01 PROCEDURE — 99213 OFFICE O/P EST LOW 20 MIN: CPT | Mod: PBBFAC,PN,25 | Performed by: INTERNAL MEDICINE

## 2024-01-01 PROCEDURE — 93970 EXTREMITY STUDY: CPT | Mod: TC,PO

## 2024-01-01 PROCEDURE — 37000009 HC ANESTHESIA EA ADD 15 MINS: Performed by: UROLOGY

## 2024-01-01 PROCEDURE — 83540 ASSAY OF IRON: CPT | Performed by: STUDENT IN AN ORGANIZED HEALTH CARE EDUCATION/TRAINING PROGRAM

## 2024-01-01 PROCEDURE — 90832 PSYTX W PT 30 MINUTES: CPT | Mod: AH,HB,95,

## 2024-01-01 PROCEDURE — 3008F BODY MASS INDEX DOCD: CPT | Mod: CPTII,,, | Performed by: OBSTETRICS & GYNECOLOGY

## 2024-01-01 PROCEDURE — 87449 NOS EACH ORGANISM AG IA: CPT

## 2024-01-01 PROCEDURE — U0002 COVID-19 LAB TEST NON-CDC: HCPCS | Performed by: INTERNAL MEDICINE

## 2024-01-01 PROCEDURE — 99999 PR PBB SHADOW E&M-EST. PATIENT-LVL V: CPT | Mod: PBBFAC,,, | Performed by: OBSTETRICS & GYNECOLOGY

## 2024-01-01 PROCEDURE — 3044F HG A1C LEVEL LT 7.0%: CPT | Mod: CPTII,,, | Performed by: NURSE PRACTITIONER

## 2024-01-01 PROCEDURE — 3075F SYST BP GE 130 - 139MM HG: CPT | Mod: CPTII,,, | Performed by: INTERNAL MEDICINE

## 2024-01-01 PROCEDURE — 77295 3-D RADIOTHERAPY PLAN: CPT | Mod: 26,,, | Performed by: RADIOLOGY

## 2024-01-01 PROCEDURE — C1769 GUIDE WIRE: HCPCS | Performed by: UROLOGY

## 2024-01-01 PROCEDURE — 99215 OFFICE O/P EST HI 40 MIN: CPT | Mod: 95,,, | Performed by: INTERNAL MEDICINE

## 2024-01-01 PROCEDURE — 3008F BODY MASS INDEX DOCD: CPT | Mod: CPTII,,, | Performed by: NURSE PRACTITIONER

## 2024-01-01 PROCEDURE — G2211 COMPLEX E/M VISIT ADD ON: HCPCS | Mod: 95,,, | Performed by: INTERNAL MEDICINE

## 2024-01-01 PROCEDURE — 1160F RVW MEDS BY RX/DR IN RCRD: CPT | Mod: CPTII,,, | Performed by: INTERNAL MEDICINE

## 2024-01-01 PROCEDURE — 97162 PT EVAL MOD COMPLEX 30 MIN: CPT

## 2024-01-01 PROCEDURE — 63600175 PHARM REV CODE 636 W HCPCS: Performed by: ANESTHESIOLOGY

## 2024-01-01 PROCEDURE — 83690 ASSAY OF LIPASE: CPT

## 2024-01-01 PROCEDURE — 83735 ASSAY OF MAGNESIUM: CPT | Mod: PN | Performed by: INTERNAL MEDICINE

## 2024-01-01 PROCEDURE — 77290 THER RAD SIMULAJ FIELD CPLX: CPT | Mod: TC | Performed by: RADIOLOGY

## 2024-01-01 PROCEDURE — 3077F SYST BP >= 140 MM HG: CPT | Mod: CPTII,,,

## 2024-01-01 PROCEDURE — 0D9670Z DRAINAGE OF STOMACH WITH DRAINAGE DEVICE, VIA NATURAL OR ARTIFICIAL OPENING: ICD-10-PCS | Performed by: HOSPITALIST

## 2024-01-01 PROCEDURE — 3008F BODY MASS INDEX DOCD: CPT | Mod: CPTII,,,

## 2024-01-01 PROCEDURE — 77295 3-D RADIOTHERAPY PLAN: CPT | Mod: TC | Performed by: RADIOLOGY

## 2024-01-01 PROCEDURE — 97164 PT RE-EVAL EST PLAN CARE: CPT

## 2024-01-01 PROCEDURE — 96367 TX/PROPH/DG ADDL SEQ IV INF: CPT | Mod: PN

## 2024-01-01 PROCEDURE — 77431 RADIATION THERAPY MANAGEMENT: CPT | Mod: ,,, | Performed by: RADIOLOGY

## 2024-01-01 PROCEDURE — 36000706: Performed by: UROLOGY

## 2024-01-01 PROCEDURE — 77014 HC CT GUIDANCE RADIATION THERAPY FLDS PLACEMENT: CPT | Mod: TC | Performed by: RADIOLOGY

## 2024-01-01 PROCEDURE — D9220A PRA ANESTHESIA: Mod: CRNA,,, | Performed by: NURSE ANESTHETIST, CERTIFIED REGISTERED

## 2024-01-01 PROCEDURE — 25000003 PHARM REV CODE 250: Performed by: INTERNAL MEDICINE

## 2024-01-01 PROCEDURE — 36000707: Performed by: UROLOGY

## 2024-01-01 PROCEDURE — 84443 ASSAY THYROID STIM HORMONE: CPT | Performed by: INTERNAL MEDICINE

## 2024-01-01 PROCEDURE — 4010F ACE/ARB THERAPY RXD/TAKEN: CPT | Mod: CPTII,,, | Performed by: NURSE PRACTITIONER

## 2024-01-01 PROCEDURE — 1160F RVW MEDS BY RX/DR IN RCRD: CPT | Mod: CPTII,,,

## 2024-01-01 PROCEDURE — 3044F HG A1C LEVEL LT 7.0%: CPT | Mod: CPTII,95,, | Performed by: INTERNAL MEDICINE

## 2024-01-01 PROCEDURE — 96413 CHEMO IV INFUSION 1 HR: CPT | Mod: PN

## 2024-01-01 PROCEDURE — 99223 1ST HOSP IP/OBS HIGH 75: CPT | Mod: ,,, | Performed by: STUDENT IN AN ORGANIZED HEALTH CARE EDUCATION/TRAINING PROGRAM

## 2024-01-01 PROCEDURE — 99285 EMERGENCY DEPT VISIT HI MDM: CPT | Mod: 25

## 2024-01-01 PROCEDURE — 83880 ASSAY OF NATRIURETIC PEPTIDE: CPT | Performed by: STUDENT IN AN ORGANIZED HEALTH CARE EDUCATION/TRAINING PROGRAM

## 2024-01-01 PROCEDURE — 3080F DIAST BP >= 90 MM HG: CPT | Mod: CPTII,,, | Performed by: NURSE PRACTITIONER

## 2024-01-01 PROCEDURE — 96360 HYDRATION IV INFUSION INIT: CPT | Mod: PN

## 2024-01-01 PROCEDURE — 99223 1ST HOSP IP/OBS HIGH 75: CPT | Mod: 25,,, | Performed by: PHYSICIAN ASSISTANT

## 2024-01-01 PROCEDURE — 27000207 HC ISOLATION

## 2024-01-01 PROCEDURE — 25500020 PHARM REV CODE 255: Mod: PO | Performed by: INTERNAL MEDICINE

## 2024-01-01 PROCEDURE — 3074F SYST BP LT 130 MM HG: CPT | Mod: CPTII,,, | Performed by: OBSTETRICS & GYNECOLOGY

## 2024-01-01 PROCEDURE — 94799 UNLISTED PULMONARY SVC/PX: CPT

## 2024-01-01 PROCEDURE — 99999 PR PBB SHADOW E&M-EST. PATIENT-LVL III: CPT | Mod: PBBFAC,,, | Performed by: INTERNAL MEDICINE

## 2024-01-01 PROCEDURE — 1160F RVW MEDS BY RX/DR IN RCRD: CPT | Mod: CPTII,95,, | Performed by: NURSE PRACTITIONER

## 2024-01-01 PROCEDURE — 36415 COLL VENOUS BLD VENIPUNCTURE: CPT | Mod: PO

## 2024-01-01 PROCEDURE — 3079F DIAST BP 80-89 MM HG: CPT | Mod: CPTII,,, | Performed by: OBSTETRICS & GYNECOLOGY

## 2024-01-01 PROCEDURE — 4010F ACE/ARB THERAPY RXD/TAKEN: CPT | Mod: CPTII,95,, | Performed by: INTERNAL MEDICINE

## 2024-01-01 PROCEDURE — 99497 ADVNCD CARE PLAN 30 MIN: CPT | Mod: 25,,, | Performed by: STUDENT IN AN ORGANIZED HEALTH CARE EDUCATION/TRAINING PROGRAM

## 2024-01-01 PROCEDURE — 27000221 HC OXYGEN, UP TO 24 HOURS

## 2024-01-01 PROCEDURE — 73552 X-RAY EXAM OF FEMUR 2/>: CPT | Mod: TC,FY,PO,LT

## 2024-01-01 PROCEDURE — 1159F MED LIST DOCD IN RCRD: CPT | Mod: CPTII,,, | Performed by: NURSE PRACTITIONER

## 2024-01-01 PROCEDURE — 25000003 PHARM REV CODE 250: Mod: PN | Performed by: STUDENT IN AN ORGANIZED HEALTH CARE EDUCATION/TRAINING PROGRAM

## 2024-01-01 PROCEDURE — 96375 TX/PRO/DX INJ NEW DRUG ADDON: CPT

## 2024-01-01 PROCEDURE — 99291 CRITICAL CARE FIRST HOUR: CPT | Mod: ,,, | Performed by: PHYSICIAN ASSISTANT

## 2024-01-01 PROCEDURE — 99999 PR PBB SHADOW E&M-EST. PATIENT-LVL IV: CPT | Mod: PBBFAC,,, | Performed by: INTERNAL MEDICINE

## 2024-01-01 PROCEDURE — 99239 HOSP IP/OBS DSCHRG MGMT >30: CPT | Mod: ,,, | Performed by: HOSPITALIST

## 2024-01-01 PROCEDURE — 4010F ACE/ARB THERAPY RXD/TAKEN: CPT | Mod: CPTII,,, | Performed by: OBSTETRICS & GYNECOLOGY

## 2024-01-01 PROCEDURE — 77334 RADIATION TREATMENT AID(S): CPT | Mod: 26,,, | Performed by: RADIOLOGY

## 2024-01-01 PROCEDURE — 77387 GUIDANCE FOR RADJ TX DLVR: CPT | Mod: TC | Performed by: RADIOLOGY

## 2024-01-01 PROCEDURE — 36415 COLL VENOUS BLD VENIPUNCTURE: CPT | Performed by: PHYSICIAN ASSISTANT

## 2024-01-01 PROCEDURE — D9220A PRA ANESTHESIA: Mod: ANES,,, | Performed by: ANESTHESIOLOGY

## 2024-01-01 PROCEDURE — 99214 OFFICE O/P EST MOD 30 MIN: CPT | Mod: PBBFAC,PN | Performed by: OBSTETRICS & GYNECOLOGY

## 2024-01-01 PROCEDURE — 1159F MED LIST DOCD IN RCRD: CPT | Mod: CPTII,,,

## 2024-01-01 PROCEDURE — 99999 PR PBB SHADOW E&M-EST. PATIENT-LVL V: CPT | Mod: PBBFAC,,,

## 2024-01-01 PROCEDURE — 82140 ASSAY OF AMMONIA: CPT | Performed by: PHYSICIAN ASSISTANT

## 2024-01-01 PROCEDURE — 1159F MED LIST DOCD IN RCRD: CPT | Mod: CPTII,95,, | Performed by: NURSE PRACTITIONER

## 2024-01-01 PROCEDURE — 77300 RADIATION THERAPY DOSE PLAN: CPT | Mod: 26,,, | Performed by: RADIOLOGY

## 2024-01-01 PROCEDURE — 85730 THROMBOPLASTIN TIME PARTIAL: CPT | Performed by: PHYSICIAN ASSISTANT

## 2024-01-01 PROCEDURE — 99999 PR PBB SHADOW E&M-EST. PATIENT-LVL IV: CPT | Mod: PBBFAC,,, | Performed by: OBSTETRICS & GYNECOLOGY

## 2024-01-01 PROCEDURE — 99215 OFFICE O/P EST HI 40 MIN: CPT | Mod: PBBFAC,PN | Performed by: OBSTETRICS & GYNECOLOGY

## 2024-01-01 PROCEDURE — 20000000 HC ICU ROOM

## 2024-01-01 PROCEDURE — 37000008 HC ANESTHESIA 1ST 15 MINUTES: Performed by: UROLOGY

## 2024-01-01 PROCEDURE — 94761 N-INVAS EAR/PLS OXIMETRY MLT: CPT | Mod: XB

## 2024-01-01 PROCEDURE — 82550 ASSAY OF CK (CPK): CPT | Performed by: STUDENT IN AN ORGANIZED HEALTH CARE EDUCATION/TRAINING PROGRAM

## 2024-01-01 PROCEDURE — 97112 NEUROMUSCULAR REEDUCATION: CPT

## 2024-01-01 PROCEDURE — 99213 OFFICE O/P EST LOW 20 MIN: CPT | Mod: PBBFAC,25,PN | Performed by: INTERNAL MEDICINE

## 2024-01-01 PROCEDURE — 83605 ASSAY OF LACTIC ACID: CPT

## 2024-01-01 PROCEDURE — 96374 THER/PROPH/DIAG INJ IV PUSH: CPT | Mod: PN

## 2024-01-01 PROCEDURE — 99499 UNLISTED E&M SERVICE: CPT | Mod: ,,, | Performed by: HOSPITALIST

## 2024-01-01 PROCEDURE — 63600175 PHARM REV CODE 636 W HCPCS: Mod: PN

## 2024-01-01 PROCEDURE — A9552 F18 FDG: HCPCS | Mod: PN | Performed by: INTERNAL MEDICINE

## 2024-01-01 PROCEDURE — 85652 RBC SED RATE AUTOMATED: CPT | Performed by: STUDENT IN AN ORGANIZED HEALTH CARE EDUCATION/TRAINING PROGRAM

## 2024-01-01 PROCEDURE — 77263 THER RADIOLOGY TX PLNG CPLX: CPT | Mod: ,,, | Performed by: RADIOLOGY

## 2024-01-01 PROCEDURE — G2211 COMPLEX E/M VISIT ADD ON: HCPCS | Mod: S$PBB,,,

## 2024-01-01 PROCEDURE — 96374 THER/PROPH/DIAG INJ IV PUSH: CPT

## 2024-01-01 RX ORDER — SODIUM CHLORIDE 9 MG/ML
2000 INJECTION, SOLUTION INTRAVENOUS
Status: COMPLETED | OUTPATIENT
Start: 2024-01-01 | End: 2024-01-01

## 2024-01-01 RX ORDER — HYDROMORPHONE HYDROCHLORIDE 1 MG/ML
0.5 INJECTION, SOLUTION INTRAMUSCULAR; INTRAVENOUS; SUBCUTANEOUS
Status: DISCONTINUED | OUTPATIENT
Start: 2024-01-01 | End: 2024-01-01 | Stop reason: HOSPADM

## 2024-01-01 RX ORDER — DIPHENHYDRAMINE HYDROCHLORIDE 50 MG/ML
50 INJECTION INTRAMUSCULAR; INTRAVENOUS ONCE AS NEEDED
Status: CANCELLED | OUTPATIENT
Start: 2024-01-01

## 2024-01-01 RX ORDER — ONDANSETRON HYDROCHLORIDE 2 MG/ML
8 INJECTION, SOLUTION INTRAVENOUS EVERY 8 HOURS PRN
Status: DISCONTINUED | OUTPATIENT
Start: 2024-01-01 | End: 2024-01-01

## 2024-01-01 RX ORDER — SODIUM CHLORIDE 0.9 % (FLUSH) 0.9 %
10 SYRINGE (ML) INJECTION
Status: CANCELLED | OUTPATIENT
Start: 2024-01-01

## 2024-01-01 RX ORDER — HEPARIN 100 UNIT/ML
500 SYRINGE INTRAVENOUS
Status: CANCELLED | OUTPATIENT
Start: 2024-01-01

## 2024-01-01 RX ORDER — HEPARIN SODIUM 5000 [USP'U]/ML
7500 INJECTION, SOLUTION INTRAVENOUS; SUBCUTANEOUS EVERY 8 HOURS
Status: DISCONTINUED | OUTPATIENT
Start: 2024-01-01 | End: 2024-01-01

## 2024-01-01 RX ORDER — SCOLOPAMINE TRANSDERMAL SYSTEM 1 MG/1
1 PATCH, EXTENDED RELEASE TRANSDERMAL
Status: DISCONTINUED | OUTPATIENT
Start: 2024-01-01 | End: 2024-05-02 | Stop reason: HOSPADM

## 2024-01-01 RX ORDER — DICYCLOMINE HYDROCHLORIDE 20 MG/1
20 TABLET ORAL 2 TIMES DAILY PRN
Qty: 180 TABLET | Refills: 0 | Status: SHIPPED | OUTPATIENT
Start: 2024-01-01 | End: 2024-07-07

## 2024-01-01 RX ORDER — POTASSIUM CHLORIDE 750 MG/1
40 CAPSULE, EXTENDED RELEASE ORAL DAILY
Qty: 120 CAPSULE | Refills: 3 | Status: SHIPPED | OUTPATIENT
Start: 2024-01-01

## 2024-01-01 RX ORDER — FENTANYL CITRATE 50 UG/ML
25 INJECTION, SOLUTION INTRAMUSCULAR; INTRAVENOUS EVERY 5 MIN PRN
Status: DISCONTINUED | OUTPATIENT
Start: 2024-01-01 | End: 2024-01-01

## 2024-01-01 RX ORDER — POTASSIUM CHLORIDE 7.45 MG/ML
10 INJECTION INTRAVENOUS
Status: COMPLETED | OUTPATIENT
Start: 2024-01-01 | End: 2024-01-01

## 2024-01-01 RX ORDER — EPINEPHRINE 0.3 MG/.3ML
0.3 INJECTION SUBCUTANEOUS ONCE AS NEEDED
Status: CANCELLED | OUTPATIENT
Start: 2024-01-01

## 2024-01-01 RX ORDER — FENTANYL CITRATE 50 UG/ML
INJECTION, SOLUTION INTRAMUSCULAR; INTRAVENOUS
Status: DISCONTINUED | OUTPATIENT
Start: 2024-01-01 | End: 2024-01-01

## 2024-01-01 RX ORDER — LORAZEPAM 1 MG/1
1 TABLET ORAL EVERY 12 HOURS PRN
Qty: 30 TABLET | Refills: 0 | Status: SHIPPED | OUTPATIENT
Start: 2024-01-01 | End: 2024-01-01 | Stop reason: SDUPTHER

## 2024-01-01 RX ORDER — KETOROLAC TROMETHAMINE 15 MG/ML
15 INJECTION, SOLUTION INTRAMUSCULAR; INTRAVENOUS EVERY 6 HOURS PRN
Status: DISCONTINUED | OUTPATIENT
Start: 2024-01-01 | End: 2024-05-02 | Stop reason: HOSPADM

## 2024-01-01 RX ORDER — MAGNESIUM SULFATE HEPTAHYDRATE 40 MG/ML
2 INJECTION, SOLUTION INTRAVENOUS ONCE
Status: COMPLETED | OUTPATIENT
Start: 2024-01-01 | End: 2024-01-01

## 2024-01-01 RX ORDER — ONDANSETRON HYDROCHLORIDE 2 MG/ML
4 INJECTION, SOLUTION INTRAVENOUS DAILY PRN
Status: DISCONTINUED | OUTPATIENT
Start: 2024-01-01 | End: 2024-01-01

## 2024-01-01 RX ORDER — PROCHLORPERAZINE EDISYLATE 5 MG/ML
10 INJECTION INTRAMUSCULAR; INTRAVENOUS
Status: CANCELLED
Start: 2024-01-01

## 2024-01-01 RX ORDER — HALOPERIDOL 5 MG/ML
0.5 INJECTION INTRAMUSCULAR EVERY 10 MIN PRN
Status: DISCONTINUED | OUTPATIENT
Start: 2024-01-01 | End: 2024-01-01 | Stop reason: HOSPADM

## 2024-01-01 RX ORDER — OXYCODONE HCL 10 MG/1
30 TABLET, FILM COATED, EXTENDED RELEASE ORAL EVERY 12 HOURS
Status: DISCONTINUED | OUTPATIENT
Start: 2024-01-01 | End: 2024-01-01

## 2024-01-01 RX ORDER — POTASSIUM CHLORIDE 20 MEQ/1
40 TABLET, EXTENDED RELEASE ORAL ONCE
Status: COMPLETED | OUTPATIENT
Start: 2024-01-01 | End: 2024-01-01

## 2024-01-01 RX ORDER — FAMOTIDINE 20 MG/1
20 TABLET, FILM COATED ORAL DAILY
Status: DISCONTINUED | OUTPATIENT
Start: 2024-01-01 | End: 2024-01-01

## 2024-01-01 RX ORDER — CALCIUM CARBONATE 200(500)MG
1000 TABLET,CHEWABLE ORAL 3 TIMES DAILY PRN
Status: DISCONTINUED | OUTPATIENT
Start: 2024-01-01 | End: 2024-01-01 | Stop reason: HOSPADM

## 2024-01-01 RX ORDER — SUCRALFATE 1 G/10ML
1 SUSPENSION ORAL
Status: DISCONTINUED | OUTPATIENT
Start: 2024-01-01 | End: 2024-01-01

## 2024-01-01 RX ORDER — OXYCODONE HCL 40 MG/1
40 TABLET, FILM COATED, EXTENDED RELEASE ORAL EVERY 12 HOURS
Status: DISCONTINUED | OUTPATIENT
Start: 2024-01-01 | End: 2024-01-01

## 2024-01-01 RX ORDER — FUROSEMIDE 10 MG/ML
120 INJECTION INTRAMUSCULAR; INTRAVENOUS ONCE
Status: COMPLETED | OUTPATIENT
Start: 2024-01-01 | End: 2024-01-01

## 2024-01-01 RX ORDER — GABAPENTIN 300 MG/1
300 CAPSULE ORAL NIGHTLY
Qty: 30 CAPSULE | Refills: 0 | Status: SHIPPED | OUTPATIENT
Start: 2024-01-01 | End: 2024-01-01

## 2024-01-01 RX ORDER — GABAPENTIN 300 MG/1
300 CAPSULE ORAL NIGHTLY
Status: DISCONTINUED | OUTPATIENT
Start: 2024-01-01 | End: 2024-01-01

## 2024-01-01 RX ORDER — SODIUM CHLORIDE 9 MG/ML
INJECTION, SOLUTION INTRAVENOUS CONTINUOUS
Status: ACTIVE | OUTPATIENT
Start: 2024-01-01 | End: 2024-01-01

## 2024-01-01 RX ORDER — BUPROPION HYDROCHLORIDE 150 MG/1
150 TABLET ORAL DAILY
Status: DISCONTINUED | OUTPATIENT
Start: 2024-01-01 | End: 2024-01-01

## 2024-01-01 RX ORDER — FUROSEMIDE 10 MG/ML
40 INJECTION INTRAMUSCULAR; INTRAVENOUS ONCE
Status: COMPLETED | OUTPATIENT
Start: 2024-01-01 | End: 2024-01-01

## 2024-01-01 RX ORDER — FAMOTIDINE 10 MG/ML
INJECTION INTRAVENOUS
Status: COMPLETED
Start: 2024-01-01 | End: 2024-01-01

## 2024-01-01 RX ORDER — KETOROLAC TROMETHAMINE 15 MG/ML
15 INJECTION, SOLUTION INTRAMUSCULAR; INTRAVENOUS ONCE
Status: COMPLETED | OUTPATIENT
Start: 2024-01-01 | End: 2024-01-01

## 2024-01-01 RX ORDER — HEPARIN 100 UNIT/ML
500 SYRINGE INTRAVENOUS
Status: DISCONTINUED | OUTPATIENT
Start: 2024-01-01 | End: 2024-01-01 | Stop reason: HOSPADM

## 2024-01-01 RX ORDER — LORAZEPAM 1 MG/1
1 TABLET ORAL EVERY 12 HOURS PRN
Qty: 30 TABLET | Refills: 0 | Status: SHIPPED | OUTPATIENT
Start: 2024-01-01 | End: 2025-03-27

## 2024-01-01 RX ORDER — SUCRALFATE 1 G/1
1 TABLET ORAL 4 TIMES DAILY
Status: DISCONTINUED | OUTPATIENT
Start: 2024-01-01 | End: 2024-01-01

## 2024-01-01 RX ORDER — ALUMINUM HYDROXIDE, MAGNESIUM HYDROXIDE, AND SIMETHICONE 1200; 120; 1200 MG/30ML; MG/30ML; MG/30ML
30 SUSPENSION ORAL
Status: DISCONTINUED | OUTPATIENT
Start: 2024-01-01 | End: 2024-01-01

## 2024-01-01 RX ORDER — GLUCAGON 1 MG
1 KIT INJECTION
Status: DISCONTINUED | OUTPATIENT
Start: 2024-01-01 | End: 2024-01-01 | Stop reason: HOSPADM

## 2024-01-01 RX ORDER — PROCHLORPERAZINE EDISYLATE 5 MG/ML
10 INJECTION INTRAMUSCULAR; INTRAVENOUS ONCE AS NEEDED
Status: CANCELLED
Start: 2024-01-01

## 2024-01-01 RX ORDER — MORPHINE SULFATE 4 MG/ML
4 INJECTION, SOLUTION INTRAMUSCULAR; INTRAVENOUS EVERY 4 HOURS PRN
Status: DISCONTINUED | OUTPATIENT
Start: 2024-01-01 | End: 2024-01-01

## 2024-01-01 RX ORDER — OXYCODONE HYDROCHLORIDE 10 MG/1
10 TABLET ORAL EVERY 4 HOURS PRN
Status: DISCONTINUED | OUTPATIENT
Start: 2024-01-01 | End: 2024-01-01

## 2024-01-01 RX ORDER — DIPHENHYDRAMINE HYDROCHLORIDE 50 MG/ML
50 INJECTION INTRAMUSCULAR; INTRAVENOUS ONCE AS NEEDED
Status: DISCONTINUED | OUTPATIENT
Start: 2024-01-01 | End: 2024-01-01 | Stop reason: HOSPADM

## 2024-01-01 RX ORDER — DICYCLOMINE HYDROCHLORIDE 20 MG/1
20 TABLET ORAL 2 TIMES DAILY
COMMUNITY
Start: 2024-01-01 | End: 2024-01-01 | Stop reason: SDUPTHER

## 2024-01-01 RX ORDER — HYDROXYZINE HYDROCHLORIDE 25 MG/1
25 TABLET, FILM COATED ORAL 3 TIMES DAILY PRN
Status: DISCONTINUED | OUTPATIENT
Start: 2024-01-01 | End: 2024-01-01 | Stop reason: HOSPADM

## 2024-01-01 RX ORDER — DEXAMETHASONE SODIUM PHOSPHATE 4 MG/ML
INJECTION, SOLUTION INTRA-ARTICULAR; INTRALESIONAL; INTRAMUSCULAR; INTRAVENOUS; SOFT TISSUE
Status: DISCONTINUED | OUTPATIENT
Start: 2024-01-01 | End: 2024-01-01

## 2024-01-01 RX ORDER — SODIUM CHLORIDE 0.9 % (FLUSH) 0.9 %
10 SYRINGE (ML) INJECTION EVERY 12 HOURS PRN
Status: DISCONTINUED | OUTPATIENT
Start: 2024-01-01 | End: 2024-01-01 | Stop reason: HOSPADM

## 2024-01-01 RX ORDER — SUCRALFATE 1 G/1
1 TABLET ORAL 4 TIMES DAILY
Qty: 40 TABLET | Refills: 0 | Status: SHIPPED | OUTPATIENT
Start: 2024-01-01 | End: 2024-01-01 | Stop reason: HOSPADM

## 2024-01-01 RX ORDER — PROPOFOL 10 MG/ML
VIAL (ML) INTRAVENOUS
Status: DISCONTINUED | OUTPATIENT
Start: 2024-01-01 | End: 2024-01-01

## 2024-01-01 RX ORDER — FENTANYL CITRATE 50 UG/ML
25 INJECTION, SOLUTION INTRAMUSCULAR; INTRAVENOUS EVERY 5 MIN PRN
Status: DISCONTINUED | OUTPATIENT
Start: 2024-01-01 | End: 2024-01-01 | Stop reason: HOSPADM

## 2024-01-01 RX ORDER — AMOXICILLIN 250 MG
1 CAPSULE ORAL 2 TIMES DAILY
Status: DISCONTINUED | OUTPATIENT
Start: 2024-01-01 | End: 2024-01-01

## 2024-01-01 RX ORDER — CEFAZOLIN 2 G/1
INJECTION, POWDER, FOR SOLUTION INTRAMUSCULAR; INTRAVENOUS
Status: DISCONTINUED | OUTPATIENT
Start: 2024-01-01 | End: 2024-01-01

## 2024-01-01 RX ORDER — SODIUM CHLORIDE 0.9 % (FLUSH) 0.9 %
10 SYRINGE (ML) INJECTION
Status: DISCONTINUED | OUTPATIENT
Start: 2024-01-01 | End: 2024-01-01 | Stop reason: HOSPADM

## 2024-01-01 RX ORDER — LORAZEPAM 2 MG/ML
0.5 INJECTION INTRAMUSCULAR
Status: DISCONTINUED | OUTPATIENT
Start: 2024-01-01 | End: 2024-05-02 | Stop reason: HOSPADM

## 2024-01-01 RX ORDER — LEVOFLOXACIN 750 MG/1
750 TABLET ORAL DAILY
Qty: 14 TABLET | Refills: 0 | Status: ON HOLD | OUTPATIENT
Start: 2024-01-01 | End: 2024-01-01

## 2024-01-01 RX ORDER — ONDANSETRON HYDROCHLORIDE 2 MG/ML
4 INJECTION, SOLUTION INTRAVENOUS EVERY 6 HOURS PRN
Status: DISCONTINUED | OUTPATIENT
Start: 2024-01-01 | End: 2024-05-02 | Stop reason: HOSPADM

## 2024-01-01 RX ORDER — LIDOCAINE AND PRILOCAINE 25; 25 MG/G; MG/G
CREAM TOPICAL
Qty: 30 G | Refills: 3 | Status: CANCELLED | OUTPATIENT
Start: 2024-01-01

## 2024-01-01 RX ORDER — CINACALCET 30 MG/1
30 TABLET, FILM COATED ORAL
Qty: 30 TABLET | Refills: 11 | Status: SHIPPED | OUTPATIENT
Start: 2024-01-01 | End: 2025-01-31

## 2024-01-01 RX ORDER — LORAZEPAM 1 MG/1
1 TABLET ORAL EVERY 12 HOURS PRN
Qty: 30 TABLET | Refills: 0 | Status: CANCELLED | OUTPATIENT
Start: 2024-01-01 | End: 2025-01-07

## 2024-01-01 RX ORDER — HYDROMORPHONE HYDROCHLORIDE 1 MG/ML
0.5 INJECTION, SOLUTION INTRAMUSCULAR; INTRAVENOUS; SUBCUTANEOUS
Status: DISCONTINUED | OUTPATIENT
Start: 2024-01-01 | End: 2024-01-01

## 2024-01-01 RX ORDER — LORAZEPAM 2 MG/ML
1 INJECTION INTRAMUSCULAR EVERY 6 HOURS PRN
Status: DISCONTINUED | OUTPATIENT
Start: 2024-01-01 | End: 2024-01-01

## 2024-01-01 RX ORDER — FAMOTIDINE 40 MG/1
40 TABLET, FILM COATED ORAL DAILY
Qty: 30 TABLET | Refills: 11 | Status: SHIPPED | OUTPATIENT
Start: 2024-01-01 | End: 2025-03-21

## 2024-01-01 RX ORDER — FUROSEMIDE 10 MG/ML
INJECTION INTRAMUSCULAR; INTRAVENOUS
Status: DISCONTINUED | OUTPATIENT
Start: 2024-01-01 | End: 2024-01-01

## 2024-01-01 RX ORDER — ENOXAPARIN SODIUM 100 MG/ML
60 INJECTION SUBCUTANEOUS EVERY 12 HOURS
Status: DISCONTINUED | OUTPATIENT
Start: 2024-01-01 | End: 2024-01-01

## 2024-01-01 RX ORDER — LACTULOSE 10 G/15ML
10 SOLUTION ORAL; RECTAL EVERY 6 HOURS PRN
Qty: 150 ML | Refills: 1 | Status: SHIPPED | OUTPATIENT
Start: 2024-01-01

## 2024-01-01 RX ORDER — MIDAZOLAM HYDROCHLORIDE 1 MG/ML
INJECTION INTRAMUSCULAR; INTRAVENOUS
Status: DISCONTINUED | OUTPATIENT
Start: 2024-01-01 | End: 2024-01-01

## 2024-01-01 RX ORDER — PROCHLORPERAZINE EDISYLATE 5 MG/ML
10 INJECTION INTRAMUSCULAR; INTRAVENOUS ONCE AS NEEDED
Status: DISCONTINUED | OUTPATIENT
Start: 2024-01-01 | End: 2024-01-01 | Stop reason: HOSPADM

## 2024-01-01 RX ORDER — OXYCODONE HCL 10 MG/1
40 TABLET, FILM COATED, EXTENDED RELEASE ORAL EVERY 12 HOURS
Status: DISCONTINUED | OUTPATIENT
Start: 2024-01-01 | End: 2024-01-01

## 2024-01-01 RX ORDER — LORAZEPAM 2 MG/ML
0.5 INJECTION INTRAMUSCULAR EVERY 30 MIN PRN
Status: DISCONTINUED | OUTPATIENT
Start: 2024-01-01 | End: 2024-01-01 | Stop reason: HOSPADM

## 2024-01-01 RX ORDER — CINACALCET 30 MG/1
30 TABLET, FILM COATED ORAL
Status: DISCONTINUED | OUTPATIENT
Start: 2024-01-01 | End: 2024-01-01

## 2024-01-01 RX ORDER — ONDANSETRON HYDROCHLORIDE 2 MG/ML
INJECTION, SOLUTION INTRAVENOUS
Status: DISCONTINUED | OUTPATIENT
Start: 2024-01-01 | End: 2024-01-01

## 2024-01-01 RX ORDER — FUROSEMIDE 10 MG/ML
80 INJECTION INTRAMUSCULAR; INTRAVENOUS ONCE
Status: COMPLETED | OUTPATIENT
Start: 2024-01-01 | End: 2024-01-01

## 2024-01-01 RX ORDER — ACETAMINOPHEN 500 MG
1000 TABLET ORAL EVERY 8 HOURS
Status: DISCONTINUED | OUTPATIENT
Start: 2024-01-01 | End: 2024-01-01

## 2024-01-01 RX ORDER — PROCHLORPERAZINE MALEATE 10 MG
10 TABLET ORAL
Status: DISCONTINUED | OUTPATIENT
Start: 2024-01-01 | End: 2024-01-01

## 2024-01-01 RX ORDER — HYDROMORPHONE HYDROCHLORIDE 1 MG/ML
1 INJECTION, SOLUTION INTRAMUSCULAR; INTRAVENOUS; SUBCUTANEOUS EVERY 4 HOURS PRN
Status: DISCONTINUED | OUTPATIENT
Start: 2024-01-01 | End: 2024-01-01

## 2024-01-01 RX ORDER — IBUPROFEN 200 MG
16 TABLET ORAL
Status: DISCONTINUED | OUTPATIENT
Start: 2024-01-01 | End: 2024-01-01 | Stop reason: HOSPADM

## 2024-01-01 RX ORDER — SODIUM CHLORIDE 0.9 % (FLUSH) 0.9 %
3 SYRINGE (ML) INJECTION EVERY 4 HOURS PRN
Status: DISCONTINUED | OUTPATIENT
Start: 2024-01-01 | End: 2024-01-01 | Stop reason: HOSPADM

## 2024-01-01 RX ORDER — RISPERIDONE 1 MG/1
2 TABLET, ORALLY DISINTEGRATING ORAL NIGHTLY PRN
Status: DISCONTINUED | OUTPATIENT
Start: 2024-01-01 | End: 2024-01-01 | Stop reason: HOSPADM

## 2024-01-01 RX ORDER — ONDANSETRON HYDROCHLORIDE 2 MG/ML
8 INJECTION, SOLUTION INTRAVENOUS ONCE
Status: COMPLETED | OUTPATIENT
Start: 2024-01-01 | End: 2024-01-01

## 2024-01-01 RX ORDER — EPINEPHRINE 0.3 MG/.3ML
0.3 INJECTION SUBCUTANEOUS ONCE AS NEEDED
Status: DISCONTINUED | OUTPATIENT
Start: 2024-01-01 | End: 2024-01-01 | Stop reason: HOSPADM

## 2024-01-01 RX ORDER — MIRTAZAPINE 7.5 MG/1
7.5 TABLET, FILM COATED ORAL NIGHTLY PRN
Status: DISCONTINUED | OUTPATIENT
Start: 2024-01-01 | End: 2024-01-01

## 2024-01-01 RX ORDER — OXYCODONE HCL 10 MG/1
20 TABLET, FILM COATED, EXTENDED RELEASE ORAL EVERY 12 HOURS
Status: DISCONTINUED | OUTPATIENT
Start: 2024-01-01 | End: 2024-01-01

## 2024-01-01 RX ORDER — POLYETHYLENE GLYCOL 3350 17 G/17G
17 POWDER, FOR SOLUTION ORAL DAILY
Status: DISCONTINUED | OUTPATIENT
Start: 2024-01-01 | End: 2024-01-01

## 2024-01-01 RX ORDER — DIPHENOXYLATE HYDROCHLORIDE AND ATROPINE SULFATE 2.5; .025 MG/1; MG/1
1 TABLET ORAL 4 TIMES DAILY PRN
Status: DISCONTINUED | OUTPATIENT
Start: 2024-01-01 | End: 2024-01-01

## 2024-01-01 RX ORDER — LIDOCAINE HYDROCHLORIDE 20 MG/ML
INJECTION INTRAVENOUS
Status: DISCONTINUED | OUTPATIENT
Start: 2024-01-01 | End: 2024-01-01

## 2024-01-01 RX ORDER — ONDANSETRON 4 MG/1
4 TABLET, ORALLY DISINTEGRATING ORAL EVERY 8 HOURS PRN
Status: DISCONTINUED | OUTPATIENT
Start: 2024-01-01 | End: 2024-01-01

## 2024-01-01 RX ORDER — CYCLOBENZAPRINE HCL 5 MG
5 TABLET ORAL 3 TIMES DAILY PRN
Qty: 30 TABLET | Refills: 0 | Status: SHIPPED | OUTPATIENT
Start: 2024-01-01 | End: 2024-01-01

## 2024-01-01 RX ORDER — LORAZEPAM 1 MG/1
1 TABLET ORAL EVERY 12 HOURS PRN
Qty: 30 TABLET | Refills: 0 | Status: CANCELLED | OUTPATIENT
Start: 2024-01-01 | End: 2025-04-15

## 2024-01-01 RX ORDER — DICYCLOMINE HYDROCHLORIDE 20 MG/1
20 TABLET ORAL 2 TIMES DAILY PRN
Status: DISCONTINUED | OUTPATIENT
Start: 2024-01-01 | End: 2024-01-01 | Stop reason: HOSPADM

## 2024-01-01 RX ORDER — POLYETHYLENE GLYCOL 3350 17 G/17G
17 POWDER, FOR SOLUTION ORAL 3 TIMES DAILY
Status: DISCONTINUED | OUTPATIENT
Start: 2024-01-01 | End: 2024-01-01

## 2024-01-01 RX ORDER — FAMOTIDINE 10 MG/ML
INJECTION INTRAVENOUS
Status: DISPENSED
Start: 2024-01-01 | End: 2024-01-01

## 2024-01-01 RX ORDER — LORAZEPAM 1 MG/1
1 TABLET ORAL EVERY 12 HOURS PRN
Status: DISCONTINUED | OUTPATIENT
Start: 2024-01-01 | End: 2024-01-01

## 2024-01-01 RX ORDER — LACTULOSE 10 G/15ML
10 SOLUTION ORAL EVERY 6 HOURS PRN
Status: DISCONTINUED | OUTPATIENT
Start: 2024-01-01 | End: 2024-01-01 | Stop reason: HOSPADM

## 2024-01-01 RX ORDER — AMOXICILLIN 250 MG
2 CAPSULE ORAL 2 TIMES DAILY
Status: DISCONTINUED | OUTPATIENT
Start: 2024-01-01 | End: 2024-01-01

## 2024-01-01 RX ORDER — IBUPROFEN 200 MG
24 TABLET ORAL
Status: DISCONTINUED | OUTPATIENT
Start: 2024-01-01 | End: 2024-01-01 | Stop reason: HOSPADM

## 2024-01-01 RX ORDER — LIDOCAINE HYDROCHLORIDE 20 MG/ML
INJECTION, SOLUTION EPIDURAL; INFILTRATION; INTRACAUDAL; PERINEURAL
Status: DISCONTINUED | OUTPATIENT
Start: 2024-01-01 | End: 2024-01-01

## 2024-01-01 RX ORDER — MORPHINE SULFATE 4 MG/ML
4 INJECTION, SOLUTION INTRAMUSCULAR; INTRAVENOUS
Status: COMPLETED | OUTPATIENT
Start: 2024-01-01 | End: 2024-01-01

## 2024-01-01 RX ORDER — PROCHLORPERAZINE EDISYLATE 5 MG/ML
5 INJECTION INTRAMUSCULAR; INTRAVENOUS EVERY 6 HOURS PRN
Status: DISCONTINUED | OUTPATIENT
Start: 2024-01-01 | End: 2024-01-01

## 2024-01-01 RX ORDER — HEPARIN SODIUM 5000 [USP'U]/ML
5000 INJECTION, SOLUTION INTRAVENOUS; SUBCUTANEOUS EVERY 8 HOURS
Status: DISCONTINUED | OUTPATIENT
Start: 2024-01-01 | End: 2024-01-01

## 2024-01-01 RX ORDER — LORAZEPAM 0.5 MG/1
1 TABLET ORAL EVERY 12 HOURS PRN
Status: DISCONTINUED | OUTPATIENT
Start: 2024-01-01 | End: 2024-01-01

## 2024-01-01 RX ORDER — PHENYLEPHRINE HCL IN 0.9% NACL 1 MG/10 ML
SYRINGE (ML) INTRAVENOUS
Status: DISCONTINUED | OUTPATIENT
Start: 2024-01-01 | End: 2024-01-01

## 2024-01-01 RX ORDER — FLUDEOXYGLUCOSE F18 500 MCI/ML
12 INJECTION INTRAVENOUS
Status: COMPLETED | OUTPATIENT
Start: 2024-01-01 | End: 2024-01-01

## 2024-01-01 RX ORDER — LOPERAMIDE HYDROCHLORIDE 2 MG/1
2 CAPSULE ORAL 4 TIMES DAILY PRN
Status: DISCONTINUED | OUTPATIENT
Start: 2024-01-01 | End: 2024-01-01

## 2024-01-01 RX ORDER — SUCRALFATE 1 G/10ML
1 SUSPENSION ORAL
Qty: 414 ML | Refills: 0 | Status: SHIPPED | OUTPATIENT
Start: 2024-01-01

## 2024-01-01 RX ORDER — DIPHENHYDRAMINE HYDROCHLORIDE 50 MG/ML
25 INJECTION INTRAMUSCULAR; INTRAVENOUS EVERY 6 HOURS PRN
Status: DISCONTINUED | OUTPATIENT
Start: 2024-01-01 | End: 2024-01-01

## 2024-01-01 RX ORDER — POTASSIUM CHLORIDE 7.45 MG/ML
10 INJECTION INTRAVENOUS
Status: CANCELLED | OUTPATIENT
Start: 2024-01-01

## 2024-01-01 RX ORDER — BUPROPION HYDROCHLORIDE 150 MG/1
150 TABLET, EXTENDED RELEASE ORAL 2 TIMES DAILY
Qty: 180 TABLET | Refills: 0 | Status: SHIPPED | OUTPATIENT
Start: 2024-01-01

## 2024-01-01 RX ORDER — OXYCODONE HYDROCHLORIDE 10 MG/1
20 TABLET ORAL EVERY 4 HOURS PRN
Status: DISCONTINUED | OUTPATIENT
Start: 2024-01-01 | End: 2024-01-01

## 2024-01-01 RX ORDER — CEFUROXIME AXETIL 500 MG/1
500 TABLET ORAL EVERY 12 HOURS
Qty: 20 TABLET | Refills: 0 | Status: ON HOLD | OUTPATIENT
Start: 2024-01-01 | End: 2024-01-01 | Stop reason: HOSPADM

## 2024-01-01 RX ORDER — PANTOPRAZOLE SODIUM 20 MG/1
20 TABLET, DELAYED RELEASE ORAL DAILY
Qty: 30 TABLET | Refills: 1 | Status: CANCELLED | OUTPATIENT
Start: 2024-01-01 | End: 2025-03-21

## 2024-01-01 RX ORDER — OXYCODONE HCL 10 MG/1
10 TABLET, FILM COATED, EXTENDED RELEASE ORAL EVERY 12 HOURS PRN
Status: DISCONTINUED | OUTPATIENT
Start: 2024-01-01 | End: 2024-01-01

## 2024-01-01 RX ORDER — NALOXONE HCL 0.4 MG/ML
0.02 VIAL (ML) INJECTION
Status: DISCONTINUED | OUTPATIENT
Start: 2024-01-01 | End: 2024-01-01 | Stop reason: HOSPADM

## 2024-01-01 RX ORDER — METRONIDAZOLE 500 MG/1
500 TABLET ORAL EVERY 12 HOURS
Qty: 30 TABLET | Refills: 0 | Status: SHIPPED | OUTPATIENT
Start: 2024-01-01 | End: 2024-01-01 | Stop reason: CLARIF

## 2024-01-01 RX ORDER — PROCHLORPERAZINE MALEATE 5 MG
10 TABLET ORAL EVERY 6 HOURS PRN
Status: DISCONTINUED | OUTPATIENT
Start: 2024-01-01 | End: 2024-01-01

## 2024-01-01 RX ORDER — LORAZEPAM 2 MG/ML
1 INJECTION INTRAMUSCULAR EVERY 12 HOURS PRN
Status: DISCONTINUED | OUTPATIENT
Start: 2024-01-01 | End: 2024-01-01

## 2024-01-01 RX ORDER — HYDROMORPHONE HYDROCHLORIDE 1 MG/ML
1 INJECTION, SOLUTION INTRAMUSCULAR; INTRAVENOUS; SUBCUTANEOUS
Status: COMPLETED | OUTPATIENT
Start: 2024-01-01 | End: 2024-01-01

## 2024-01-01 RX ORDER — LOPERAMIDE HYDROCHLORIDE 2 MG/1
2 CAPSULE ORAL 4 TIMES DAILY
Status: DISCONTINUED | OUTPATIENT
Start: 2024-01-01 | End: 2024-01-01

## 2024-01-01 RX ORDER — CEFUROXIME AXETIL 500 MG/1
500 TABLET ORAL EVERY 12 HOURS
Qty: 20 TABLET | Refills: 0 | Status: SHIPPED | OUTPATIENT
Start: 2024-01-01 | End: 2024-01-01

## 2024-01-01 RX ORDER — PROCHLORPERAZINE EDISYLATE 5 MG/ML
10 INJECTION INTRAMUSCULAR; INTRAVENOUS
Status: COMPLETED | OUTPATIENT
Start: 2024-01-01 | End: 2024-01-01

## 2024-01-01 RX ORDER — SODIUM CHLORIDE 9 MG/ML
INJECTION, SOLUTION INTRAVENOUS CONTINUOUS
Status: DISCONTINUED | OUTPATIENT
Start: 2024-01-01 | End: 2024-01-01

## 2024-01-01 RX ORDER — PREDNISONE 5 MG/1
5 TABLET ORAL DAILY
Status: DISCONTINUED | OUTPATIENT
Start: 2024-01-01 | End: 2024-01-01

## 2024-01-01 RX ORDER — PROCHLORPERAZINE MALEATE 10 MG
10 TABLET ORAL
Status: CANCELLED
Start: 2024-01-01

## 2024-01-01 RX ORDER — MUPIROCIN 20 MG/G
OINTMENT TOPICAL 2 TIMES DAILY
Status: DISPENSED | OUTPATIENT
Start: 2024-01-01 | End: 2024-01-01

## 2024-01-01 RX ORDER — DEXMEDETOMIDINE HYDROCHLORIDE 100 UG/ML
INJECTION, SOLUTION INTRAVENOUS
Status: DISCONTINUED | OUTPATIENT
Start: 2024-01-01 | End: 2024-01-01

## 2024-01-01 RX ORDER — PROCHLORPERAZINE EDISYLATE 5 MG/ML
2.5 INJECTION INTRAMUSCULAR; INTRAVENOUS EVERY 6 HOURS PRN
Status: DISCONTINUED | OUTPATIENT
Start: 2024-01-01 | End: 2024-01-01

## 2024-01-01 RX ORDER — FAMOTIDINE 10 MG/ML
INJECTION INTRAVENOUS
Status: DISCONTINUED | OUTPATIENT
Start: 2024-01-01 | End: 2024-01-01

## 2024-01-01 RX ORDER — SCOLOPAMINE TRANSDERMAL SYSTEM 1 MG/1
1 PATCH, EXTENDED RELEASE TRANSDERMAL
Status: DISCONTINUED | OUTPATIENT
Start: 2024-01-01 | End: 2024-01-01 | Stop reason: HOSPADM

## 2024-01-01 RX ORDER — PROCHLORPERAZINE EDISYLATE 5 MG/ML
10 INJECTION INTRAMUSCULAR; INTRAVENOUS ONCE AS NEEDED
Status: COMPLETED | OUTPATIENT
Start: 2024-01-01 | End: 2024-01-01

## 2024-01-01 RX ORDER — LORAZEPAM 2 MG/ML
1 INJECTION INTRAMUSCULAR
Status: DISCONTINUED | OUTPATIENT
Start: 2024-01-01 | End: 2024-05-02 | Stop reason: HOSPADM

## 2024-01-01 RX ORDER — ROCURONIUM BROMIDE 10 MG/ML
INJECTION, SOLUTION INTRAVENOUS
Status: DISCONTINUED | OUTPATIENT
Start: 2024-01-01 | End: 2024-01-01

## 2024-01-01 RX ORDER — METRONIDAZOLE 500 MG/1
500 TABLET ORAL EVERY 8 HOURS
Qty: 42 TABLET | Refills: 0 | Status: ON HOLD | OUTPATIENT
Start: 2024-01-01 | End: 2024-01-01

## 2024-01-01 RX ORDER — LIDOCAINE AND PRILOCAINE 25; 25 MG/G; MG/G
CREAM TOPICAL
Qty: 30 G | Refills: 3 | Status: SHIPPED | OUTPATIENT
Start: 2024-01-01

## 2024-01-01 RX ORDER — LOPERAMIDE HYDROCHLORIDE 2 MG/1
4 CAPSULE ORAL 4 TIMES DAILY
Status: DISCONTINUED | OUTPATIENT
Start: 2024-01-01 | End: 2024-01-01

## 2024-01-01 RX ORDER — FAMOTIDINE 10 MG/ML
20 INJECTION INTRAVENOUS DAILY
Status: DISCONTINUED | OUTPATIENT
Start: 2024-01-01 | End: 2024-01-01

## 2024-01-01 RX ADMIN — PREDNISONE 5 MG: 5 TABLET ORAL at 09:04

## 2024-01-01 RX ADMIN — FENTANYL CITRATE 25 MCG: 50 INJECTION, SOLUTION INTRAMUSCULAR; INTRAVENOUS at 03:04

## 2024-01-01 RX ADMIN — ALUMINUM HYDROXIDE, MAGNESIUM HYDROXIDE, AND SIMETHICONE 30 ML: 1200; 120; 1200 SUSPENSION ORAL at 06:04

## 2024-01-01 RX ADMIN — CINACALCET 30 MG: 30 TABLET, FILM COATED ORAL at 10:04

## 2024-01-01 RX ADMIN — OXYCODONE HYDROCHLORIDE 15 MG: 10 TABLET ORAL at 01:04

## 2024-01-01 RX ADMIN — BEVACIZUMAB-AWWB 600 MG: 400 INJECTION, SOLUTION INTRAVENOUS at 02:02

## 2024-01-01 RX ADMIN — SUCRALFATE 1 G: 1 SUSPENSION ORAL at 09:04

## 2024-01-01 RX ADMIN — HEPARIN SODIUM 7500 UNITS: 5000 INJECTION INTRAVENOUS; SUBCUTANEOUS at 06:04

## 2024-01-01 RX ADMIN — DOCUSATE SODIUM AND SENNOSIDES 1 TABLET: 8.6; 5 TABLET, FILM COATED ORAL at 09:04

## 2024-01-01 RX ADMIN — SUCRALFATE 1 G: 1 TABLET ORAL at 09:04

## 2024-01-01 RX ADMIN — FAMOTIDINE 20 MG: 10 INJECTION, SOLUTION INTRAVENOUS at 08:04

## 2024-01-01 RX ADMIN — HEPARIN SODIUM 7500 UNITS: 5000 INJECTION INTRAVENOUS; SUBCUTANEOUS at 09:04

## 2024-01-01 RX ADMIN — OXYCODONE HYDROCHLORIDE 10 MG: 10 TABLET ORAL at 06:04

## 2024-01-01 RX ADMIN — POLYETHYLENE GLYCOL 3350 17 G: 17 POWDER, FOR SOLUTION ORAL at 08:04

## 2024-01-01 RX ADMIN — FAMOTIDINE 20 MG: 20 TABLET ORAL at 08:04

## 2024-01-01 RX ADMIN — SODIUM CHLORIDE: 9 INJECTION, SOLUTION INTRAVENOUS at 05:04

## 2024-01-01 RX ADMIN — BUPROPION HYDROCHLORIDE 150 MG: 150 TABLET, EXTENDED RELEASE ORAL at 08:04

## 2024-01-01 RX ADMIN — FAMOTIDINE 20 MG: 10 INJECTION, SOLUTION INTRAVENOUS at 02:04

## 2024-01-01 RX ADMIN — HEPARIN SODIUM 7500 UNITS: 5000 INJECTION INTRAVENOUS; SUBCUTANEOUS at 04:04

## 2024-01-01 RX ADMIN — HEPARIN SODIUM 7500 UNITS: 5000 INJECTION INTRAVENOUS; SUBCUTANEOUS at 05:04

## 2024-01-01 RX ADMIN — DOCUSATE SODIUM AND SENNOSIDES 2 TABLET: 8.6; 5 TABLET, FILM COATED ORAL at 08:04

## 2024-01-01 RX ADMIN — LOPERAMIDE HYDROCHLORIDE 2 MG: 2 CAPSULE ORAL at 10:04

## 2024-01-01 RX ADMIN — HEPARIN SODIUM 7500 UNITS: 5000 INJECTION INTRAVENOUS; SUBCUTANEOUS at 01:04

## 2024-01-01 RX ADMIN — MUPIROCIN: 20 OINTMENT TOPICAL at 09:04

## 2024-01-01 RX ADMIN — OXYCODONE HYDROCHLORIDE 40 MG: 10 TABLET, FILM COATED, EXTENDED RELEASE ORAL at 08:04

## 2024-01-01 RX ADMIN — PROPOFOL 150 MG: 10 INJECTION, EMULSION INTRAVENOUS at 02:04

## 2024-01-01 RX ADMIN — FAMOTIDINE 20 MG: 20 TABLET ORAL at 11:04

## 2024-01-01 RX ADMIN — OXYCODONE HYDROCHLORIDE 15 MG: 10 TABLET ORAL at 12:04

## 2024-01-01 RX ADMIN — SUCRALFATE 1 G: 1 TABLET ORAL at 04:04

## 2024-01-01 RX ADMIN — HYDROMORPHONE HYDROCHLORIDE 1 MG: 1 INJECTION, SOLUTION INTRAMUSCULAR; INTRAVENOUS; SUBCUTANEOUS at 01:04

## 2024-01-01 RX ADMIN — ALUMINUM HYDROXIDE, MAGNESIUM HYDROXIDE, AND SIMETHICONE 30 ML: 1200; 120; 1200 SUSPENSION ORAL at 09:04

## 2024-01-01 RX ADMIN — DEXMEDETOMIDINE 8 MCG: 100 INJECTION, SOLUTION, CONCENTRATE INTRAVENOUS at 03:04

## 2024-01-01 RX ADMIN — SUCRALFATE 1 G: 1 TABLET ORAL at 10:04

## 2024-01-01 RX ADMIN — HYDROXYZINE HYDROCHLORIDE 25 MG: 25 TABLET, FILM COATED ORAL at 10:04

## 2024-01-01 RX ADMIN — OXYCODONE HYDROCHLORIDE 15 MG: 10 TABLET ORAL at 04:04

## 2024-01-01 RX ADMIN — FAMOTIDINE 20 MG: 20 TABLET ORAL at 10:04

## 2024-01-01 RX ADMIN — SUCRALFATE 1 G: 1 SUSPENSION ORAL at 05:04

## 2024-01-01 RX ADMIN — BUPROPION HYDROCHLORIDE 150 MG: 150 TABLET, EXTENDED RELEASE ORAL at 10:04

## 2024-01-01 RX ADMIN — FUROSEMIDE 80 MG: 10 INJECTION, SOLUTION INTRAMUSCULAR; INTRAVENOUS at 12:04

## 2024-01-01 RX ADMIN — DEXAMETHASONE SODIUM PHOSPHATE 4 MG: 4 INJECTION, SOLUTION INTRAMUSCULAR; INTRAVENOUS at 03:04

## 2024-01-01 RX ADMIN — OXYCODONE HYDROCHLORIDE 15 MG: 10 TABLET ORAL at 10:04

## 2024-01-01 RX ADMIN — BUPROPION HYDROCHLORIDE 150 MG: 150 TABLET, EXTENDED RELEASE ORAL at 09:04

## 2024-01-01 RX ADMIN — PREDNISONE 5 MG: 5 TABLET ORAL at 08:04

## 2024-01-01 RX ADMIN — PREDNISONE 5 MG: 5 TABLET ORAL at 11:04

## 2024-01-01 RX ADMIN — GABAPENTIN 300 MG: 300 CAPSULE ORAL at 09:04

## 2024-01-01 RX ADMIN — ALUMINUM HYDROXIDE, MAGNESIUM HYDROXIDE, AND SIMETHICONE 30 ML: 1200; 120; 1200 SUSPENSION ORAL at 11:04

## 2024-01-01 RX ADMIN — HYDROMORPHONE HYDROCHLORIDE 1 MG: 1 INJECTION, SOLUTION INTRAMUSCULAR; INTRAVENOUS; SUBCUTANEOUS at 08:04

## 2024-01-01 RX ADMIN — MORPHINE SULFATE 4 MG: 4 INJECTION INTRAVENOUS at 10:04

## 2024-01-01 RX ADMIN — CEFAZOLIN 3 EACH: 330 INJECTION, POWDER, FOR SOLUTION INTRAMUSCULAR; INTRAVENOUS at 09:04

## 2024-01-01 RX ADMIN — ALUMINUM HYDROXIDE, MAGNESIUM HYDROXIDE, AND SIMETHICONE 30 ML: 1200; 120; 1200 SUSPENSION ORAL at 04:04

## 2024-01-01 RX ADMIN — Medication 500 UNITS: at 03:02

## 2024-01-01 RX ADMIN — ONDANSETRON 4 MG: 2 INJECTION INTRAMUSCULAR; INTRAVENOUS at 12:04

## 2024-01-01 RX ADMIN — GABAPENTIN 300 MG: 300 CAPSULE ORAL at 10:04

## 2024-01-01 RX ADMIN — FUROSEMIDE 120 MG: 10 INJECTION, SOLUTION INTRAMUSCULAR; INTRAVENOUS at 07:04

## 2024-01-01 RX ADMIN — ONDANSETRON 4 MG: 2 INJECTION INTRAMUSCULAR; INTRAVENOUS at 03:04

## 2024-01-01 RX ADMIN — IOHEXOL 100 ML: 350 INJECTION, SOLUTION INTRAVENOUS at 01:01

## 2024-01-01 RX ADMIN — HEPARIN SODIUM 7500 UNITS: 5000 INJECTION INTRAVENOUS; SUBCUTANEOUS at 10:04

## 2024-01-01 RX ADMIN — OXYCODONE HYDROCHLORIDE 20 MG: 10 TABLET, FILM COATED, EXTENDED RELEASE ORAL at 11:04

## 2024-01-01 RX ADMIN — ENOXAPARIN SODIUM 60 MG: 60 INJECTION SUBCUTANEOUS at 09:04

## 2024-01-01 RX ADMIN — LEVOTHYROXINE SODIUM 350 MCG: 150 TABLET ORAL at 05:04

## 2024-01-01 RX ADMIN — OXYCODONE HYDROCHLORIDE 15 MG: 10 TABLET ORAL at 09:04

## 2024-01-01 RX ADMIN — MIDAZOLAM HYDROCHLORIDE 2 MG: 2 INJECTION, SOLUTION INTRAMUSCULAR; INTRAVENOUS at 09:04

## 2024-01-01 RX ADMIN — OXYCODONE HYDROCHLORIDE 20 MG: 10 TABLET ORAL at 04:04

## 2024-01-01 RX ADMIN — Medication 500 UNITS: at 12:04

## 2024-01-01 RX ADMIN — LIDOCAINE HYDROCHLORIDE 50 MG: 20 INJECTION INTRAVENOUS at 09:04

## 2024-01-01 RX ADMIN — POLYETHYLENE GLYCOL 3350 17 G: 17 POWDER, FOR SOLUTION ORAL at 04:04

## 2024-01-01 RX ADMIN — KETOROLAC TROMETHAMINE 15 MG: 15 INJECTION, SOLUTION INTRAMUSCULAR; INTRAVENOUS at 04:05

## 2024-01-01 RX ADMIN — OXYCODONE HYDROCHLORIDE 20 MG: 10 TABLET, FILM COATED, EXTENDED RELEASE ORAL at 09:04

## 2024-01-01 RX ADMIN — OXYCODONE HYDROCHLORIDE 15 MG: 10 TABLET ORAL at 05:04

## 2024-01-01 RX ADMIN — FUROSEMIDE 10 MG: 10 INJECTION, SOLUTION INTRAVENOUS at 09:04

## 2024-01-01 RX ADMIN — OXYCODONE HYDROCHLORIDE 15 MG: 10 TABLET ORAL at 08:04

## 2024-01-01 RX ADMIN — POTASSIUM CHLORIDE 10 MEQ: 10 INJECTION, SOLUTION INTRAVENOUS at 11:03

## 2024-01-01 RX ADMIN — PREDNISONE 5 MG: 5 TABLET ORAL at 10:04

## 2024-01-01 RX ADMIN — POLYETHYLENE GLYCOL 3350 17 G: 17 POWDER, FOR SOLUTION ORAL at 09:04

## 2024-01-01 RX ADMIN — HEPARIN SODIUM 7500 UNITS: 5000 INJECTION INTRAVENOUS; SUBCUTANEOUS at 02:04

## 2024-01-01 RX ADMIN — HYDROMORPHONE HYDROCHLORIDE 1 MG/HR: 10 INJECTION, SOLUTION INTRAMUSCULAR; INTRAVENOUS; SUBCUTANEOUS at 03:05

## 2024-01-01 RX ADMIN — IRON SUCROSE 200 MG: 20 INJECTION, SOLUTION INTRAVENOUS at 02:02

## 2024-01-01 RX ADMIN — HEPARIN SODIUM 7500 UNITS: 5000 INJECTION INTRAVENOUS; SUBCUTANEOUS at 03:04

## 2024-01-01 RX ADMIN — ACETAMINOPHEN 1000 MG: 500 TABLET ORAL at 01:04

## 2024-01-01 RX ADMIN — ENOXAPARIN SODIUM 60 MG: 60 INJECTION SUBCUTANEOUS at 10:04

## 2024-01-01 RX ADMIN — ACETAMINOPHEN 1000 MG: 500 TABLET ORAL at 03:04

## 2024-01-01 RX ADMIN — POTASSIUM CHLORIDE 40 MEQ: 1500 TABLET, EXTENDED RELEASE ORAL at 10:04

## 2024-01-01 RX ADMIN — ACETAMINOPHEN 1000 MG: 500 TABLET ORAL at 08:04

## 2024-01-01 RX ADMIN — ALUMINUM HYDROXIDE, MAGNESIUM HYDROXIDE, AND SIMETHICONE 30 ML: 1200; 120; 1200 SUSPENSION ORAL at 05:04

## 2024-01-01 RX ADMIN — CINACALCET 30 MG: 30 TABLET, FILM COATED ORAL at 08:04

## 2024-01-01 RX ADMIN — GABAPENTIN 300 MG: 300 CAPSULE ORAL at 08:04

## 2024-01-01 RX ADMIN — LEVOTHYROXINE SODIUM 350 MCG: 150 TABLET ORAL at 06:04

## 2024-01-01 RX ADMIN — LOPERAMIDE HYDROCHLORIDE 2 MG: 2 CAPSULE ORAL at 08:04

## 2024-01-01 RX ADMIN — OXYCODONE HYDROCHLORIDE 20 MG: 10 TABLET, FILM COATED, EXTENDED RELEASE ORAL at 03:04

## 2024-01-01 RX ADMIN — IRON SUCROSE 200 MG: 20 INJECTION, SOLUTION INTRAVENOUS at 11:03

## 2024-01-01 RX ADMIN — PROPOFOL 60 MG: 10 INJECTION, EMULSION INTRAVENOUS at 09:04

## 2024-01-01 RX ADMIN — PROPOFOL 150 MCG/KG/MIN: 10 INJECTION, EMULSION INTRAVENOUS at 09:04

## 2024-01-01 RX ADMIN — PROCHLORPERAZINE EDISYLATE 10 MG: 5 INJECTION INTRAMUSCULAR; INTRAVENOUS at 11:03

## 2024-01-01 RX ADMIN — DEXAMETHASONE SODIUM PHOSPHATE 4 MG: 4 INJECTION, SOLUTION INTRAMUSCULAR; INTRAVENOUS at 09:04

## 2024-01-01 RX ADMIN — SODIUM CHLORIDE, POTASSIUM CHLORIDE, SODIUM LACTATE AND CALCIUM CHLORIDE 500 ML: 600; 310; 30; 20 INJECTION, SOLUTION INTRAVENOUS at 12:04

## 2024-01-01 RX ADMIN — FAMOTIDINE 20 MG: 20 TABLET ORAL at 09:04

## 2024-01-01 RX ADMIN — HYDROXYZINE HYDROCHLORIDE 25 MG: 25 TABLET, FILM COATED ORAL at 02:04

## 2024-01-01 RX ADMIN — ONDANSETRON 8 MG: 2 INJECTION INTRAMUSCULAR; INTRAVENOUS at 04:04

## 2024-01-01 RX ADMIN — ACETAMINOPHEN 1000 MG: 500 TABLET ORAL at 05:04

## 2024-01-01 RX ADMIN — Medication 100 MCG: at 09:04

## 2024-01-01 RX ADMIN — SODIUM CHLORIDE: 9 INJECTION, SOLUTION INTRAVENOUS at 09:03

## 2024-01-01 RX ADMIN — OXYCODONE HYDROCHLORIDE 15 MG: 10 TABLET ORAL at 02:04

## 2024-01-01 RX ADMIN — OXYCODONE HYDROCHLORIDE 20 MG: 10 TABLET, FILM COATED, EXTENDED RELEASE ORAL at 08:04

## 2024-01-01 RX ADMIN — HYDROXYZINE HYDROCHLORIDE 25 MG: 25 TABLET, FILM COATED ORAL at 08:04

## 2024-01-01 RX ADMIN — PROCHLORPERAZINE MALEATE 10 MG: 5 TABLET ORAL at 06:04

## 2024-01-01 RX ADMIN — BUPROPION HYDROCHLORIDE 150 MG: 150 TABLET, EXTENDED RELEASE ORAL at 11:04

## 2024-01-01 RX ADMIN — ONDANSETRON 8 MG: 2 INJECTION INTRAMUSCULAR; INTRAVENOUS at 01:03

## 2024-01-01 RX ADMIN — IOHEXOL 1000 ML: 12 SOLUTION ORAL at 01:01

## 2024-01-01 RX ADMIN — SODIUM CHLORIDE: 0.9 INJECTION, SOLUTION INTRAVENOUS at 02:04

## 2024-01-01 RX ADMIN — PREDNISONE 5 MG: 5 TABLET ORAL at 12:04

## 2024-01-01 RX ADMIN — HYDROMORPHONE HYDROCHLORIDE 1 MG: 1 INJECTION, SOLUTION INTRAMUSCULAR; INTRAVENOUS; SUBCUTANEOUS at 02:04

## 2024-01-01 RX ADMIN — SODIUM CHLORIDE: 0.9 INJECTION, SOLUTION INTRAVENOUS at 03:04

## 2024-01-01 RX ADMIN — BUPROPION HYDROCHLORIDE 150 MG: 150 TABLET, EXTENDED RELEASE ORAL at 12:04

## 2024-01-01 RX ADMIN — ONDANSETRON 4 MG: 4 TABLET, ORALLY DISINTEGRATING ORAL at 03:04

## 2024-01-01 RX ADMIN — Medication 500 UNITS: at 01:03

## 2024-01-01 RX ADMIN — CINACALCET 30 MG: 30 TABLET ORAL at 08:04

## 2024-01-01 RX ADMIN — OXYCODONE HYDROCHLORIDE 10 MG: 10 TABLET ORAL at 03:04

## 2024-01-01 RX ADMIN — LOPERAMIDE HYDROCHLORIDE 2 MG: 2 CAPSULE ORAL at 06:04

## 2024-01-01 RX ADMIN — OXYCODONE HYDROCHLORIDE 10 MG: 10 TABLET ORAL at 12:04

## 2024-01-01 RX ADMIN — OXYCODONE HYDROCHLORIDE 10 MG: 10 TABLET, FILM COATED, EXTENDED RELEASE ORAL at 03:04

## 2024-01-01 RX ADMIN — SODIUM CHLORIDE: 9 INJECTION, SOLUTION INTRAVENOUS at 01:02

## 2024-01-01 RX ADMIN — ALUMINUM HYDROXIDE, MAGNESIUM HYDROXIDE, AND SIMETHICONE 30 ML: 1200; 120; 1200 SUSPENSION ORAL at 08:04

## 2024-01-01 RX ADMIN — SCOPALAMINE 1 PATCH: 1 PATCH, EXTENDED RELEASE TRANSDERMAL at 11:05

## 2024-01-01 RX ADMIN — HYDROMORPHONE HYDROCHLORIDE 1 MG: 1 INJECTION, SOLUTION INTRAMUSCULAR; INTRAVENOUS; SUBCUTANEOUS at 07:04

## 2024-01-01 RX ADMIN — HYDROMORPHONE HYDROCHLORIDE 1 MG: 1 INJECTION, SOLUTION INTRAMUSCULAR; INTRAVENOUS; SUBCUTANEOUS at 12:04

## 2024-01-01 RX ADMIN — LORAZEPAM 1 MG/HR: 2 INJECTION INTRAMUSCULAR; INTRAVENOUS at 12:05

## 2024-01-01 RX ADMIN — ENOXAPARIN SODIUM 60 MG: 60 INJECTION SUBCUTANEOUS at 09:05

## 2024-01-01 RX ADMIN — OXYCODONE HYDROCHLORIDE 20 MG: 10 TABLET, FILM COATED, EXTENDED RELEASE ORAL at 12:04

## 2024-01-01 RX ADMIN — SUCRALFATE 1 G: 1 SUSPENSION ORAL at 11:04

## 2024-01-01 RX ADMIN — VANCOMYCIN HYDROCHLORIDE 1000 MG: 1 INJECTION, POWDER, LYOPHILIZED, FOR SOLUTION INTRAVENOUS at 03:04

## 2024-01-01 RX ADMIN — MORPHINE SULFATE 4 MG: 4 INJECTION INTRAVENOUS at 05:05

## 2024-01-01 RX ADMIN — LIDOCAINE HYDROCHLORIDE 100 MG: 20 INJECTION, SOLUTION EPIDURAL; INFILTRATION; INTRACAUDAL; PERINEURAL at 02:04

## 2024-01-01 RX ADMIN — LORAZEPAM 1 MG: 0.5 TABLET ORAL at 04:04

## 2024-01-01 RX ADMIN — SODIUM CHLORIDE 2000 ML: 9 INJECTION, SOLUTION INTRAVENOUS at 09:04

## 2024-01-01 RX ADMIN — SODIUM CHLORIDE: 9 INJECTION, SOLUTION INTRAVENOUS at 04:04

## 2024-01-01 RX ADMIN — LOPERAMIDE HYDROCHLORIDE 4 MG: 2 CAPSULE ORAL at 12:04

## 2024-01-01 RX ADMIN — OXYCODONE HYDROCHLORIDE 10 MG: 10 TABLET, FILM COATED, EXTENDED RELEASE ORAL at 01:04

## 2024-01-01 RX ADMIN — FUROSEMIDE 40 MG: 10 INJECTION, SOLUTION INTRAMUSCULAR; INTRAVENOUS at 09:04

## 2024-01-01 RX ADMIN — IOHEXOL 60 ML: 300 INJECTION, SOLUTION INTRAVENOUS at 03:04

## 2024-01-01 RX ADMIN — HYDROMORPHONE HYDROCHLORIDE 1 MG: 1 INJECTION, SOLUTION INTRAMUSCULAR; INTRAVENOUS; SUBCUTANEOUS at 04:04

## 2024-01-01 RX ADMIN — DEXMEDETOMIDINE 12 MCG: 100 INJECTION, SOLUTION, CONCENTRATE INTRAVENOUS at 03:04

## 2024-01-01 RX ADMIN — LOPERAMIDE HYDROCHLORIDE 2 MG: 2 CAPSULE ORAL at 11:04

## 2024-01-01 RX ADMIN — MAGNESIUM SULFATE HEPTAHYDRATE 2 G: 40 INJECTION, SOLUTION INTRAVENOUS at 10:04

## 2024-01-01 RX ADMIN — IRON SUCROSE 200 MG: 20 INJECTION, SOLUTION INTRAVENOUS at 11:04

## 2024-01-01 RX ADMIN — HEPARIN SODIUM 7500 UNITS: 5000 INJECTION INTRAVENOUS; SUBCUTANEOUS at 12:04

## 2024-01-01 RX ADMIN — FENTANYL CITRATE 25 MCG: 50 INJECTION, SOLUTION INTRAMUSCULAR; INTRAVENOUS at 09:04

## 2024-01-01 RX ADMIN — SODIUM CHLORIDE: 0.9 INJECTION, SOLUTION INTRAVENOUS at 08:04

## 2024-01-01 RX ADMIN — IRON SUCROSE 200 MG: 20 INJECTION, SOLUTION INTRAVENOUS at 09:03

## 2024-01-01 RX ADMIN — DICYCLOMINE HYDROCHLORIDE 20 MG: 20 TABLET ORAL at 10:04

## 2024-01-01 RX ADMIN — FLUDEOXYGLUCOSE F-18 10.3 MILLICURIE: 500 INJECTION INTRAVENOUS at 07:04

## 2024-01-01 RX ADMIN — LORAZEPAM 1 MG: 2 INJECTION INTRAMUSCULAR; INTRAVENOUS at 12:05

## 2024-01-01 RX ADMIN — FENTANYL CITRATE 50 MCG: 50 INJECTION, SOLUTION INTRAMUSCULAR; INTRAVENOUS at 02:04

## 2024-01-01 RX ADMIN — DICYCLOMINE HYDROCHLORIDE 20 MG: 20 TABLET ORAL at 08:04

## 2024-01-01 RX ADMIN — OXYCODONE HYDROCHLORIDE 10 MG: 10 TABLET ORAL at 11:04

## 2024-01-01 RX ADMIN — CINACALCET 30 MG: 30 TABLET ORAL at 09:04

## 2024-01-01 RX ADMIN — SODIUM CHLORIDE: 9 INJECTION, SOLUTION INTRAVENOUS at 10:04

## 2024-01-01 RX ADMIN — SODIUM CHLORIDE: 9 INJECTION, SOLUTION INTRAVENOUS at 01:04

## 2024-01-01 RX ADMIN — LORAZEPAM 1 MG: 2 INJECTION INTRAMUSCULAR; INTRAVENOUS at 05:04

## 2024-01-01 RX ADMIN — CALCIUM CARBONATE (ANTACID) CHEW TAB 500 MG 1000 MG: 500 CHEW TAB at 11:04

## 2024-01-01 RX ADMIN — HEPARIN SODIUM 5000 UNITS: 5000 INJECTION INTRAVENOUS; SUBCUTANEOUS at 05:04

## 2024-01-01 RX ADMIN — PROCHLORPERAZINE EDISYLATE 10 MG: 5 INJECTION INTRAMUSCULAR; INTRAVENOUS at 10:04

## 2024-01-01 RX ADMIN — ACETAMINOPHEN 1000 MG: 500 TABLET ORAL at 09:04

## 2024-01-01 RX ADMIN — OXYCODONE HYDROCHLORIDE 40 MG: 10 TABLET, FILM COATED, EXTENDED RELEASE ORAL at 09:04

## 2024-01-01 RX ADMIN — SUGAMMADEX 200 MG: 100 INJECTION, SOLUTION INTRAVENOUS at 04:04

## 2024-01-01 RX ADMIN — CINACALCET 30 MG: 30 TABLET ORAL at 12:04

## 2024-01-01 RX ADMIN — HYDROMORPHONE HYDROCHLORIDE 0.5 MG: 0.5 INJECTION, SOLUTION INTRAMUSCULAR; INTRAVENOUS; SUBCUTANEOUS at 09:05

## 2024-01-01 RX ADMIN — CINACALCET 30 MG: 30 TABLET ORAL at 10:04

## 2024-01-01 RX ADMIN — PROCHLORPERAZINE EDISYLATE 2.5 MG: 5 INJECTION INTRAMUSCULAR; INTRAVENOUS at 02:04

## 2024-01-01 RX ADMIN — Medication: at 09:05

## 2024-01-01 RX ADMIN — ROCURONIUM BROMIDE 50 MG: 10 INJECTION INTRAVENOUS at 02:04

## 2024-01-01 RX ADMIN — POLYETHYLENE GLYCOL 3350 17 G: 17 POWDER, FOR SOLUTION ORAL at 12:04

## 2024-01-01 RX ADMIN — LORAZEPAM 1 MG: 2 INJECTION INTRAMUSCULAR; INTRAVENOUS at 06:05

## 2024-01-01 RX ADMIN — BEVACIZUMAB-AWWB 600 MG: 400 INJECTION, SOLUTION INTRAVENOUS at 12:03

## 2024-01-01 RX ADMIN — MUPIROCIN: 20 OINTMENT TOPICAL at 12:04

## 2024-01-01 RX ADMIN — HEPARIN SODIUM 7500 UNITS: 5000 INJECTION INTRAVENOUS; SUBCUTANEOUS at 08:04

## 2024-01-01 RX ADMIN — DOCUSATE SODIUM AND SENNOSIDES 1 TABLET: 8.6; 5 TABLET, FILM COATED ORAL at 08:04

## 2024-01-01 RX ADMIN — SUCRALFATE 1 G: 1 SUSPENSION ORAL at 06:04

## 2024-01-01 RX ADMIN — PREDNISONE 5 MG: 5 TABLET ORAL at 06:04

## 2024-01-01 RX ADMIN — LEVOTHYROXINE SODIUM 100 MCG: 150 TABLET ORAL at 05:04

## 2024-01-01 RX ADMIN — DOCUSATE SODIUM AND SENNOSIDES 1 TABLET: 8.6; 5 TABLET, FILM COATED ORAL at 12:04

## 2024-01-01 RX ADMIN — HYDROMORPHONE HYDROCHLORIDE 1 MG: 1 INJECTION, SOLUTION INTRAMUSCULAR; INTRAVENOUS; SUBCUTANEOUS at 09:04

## 2024-01-01 RX ADMIN — SUCRALFATE 1 G: 1 SUSPENSION ORAL at 08:04

## 2024-01-01 RX ADMIN — HYDROMORPHONE HYDROCHLORIDE 1 MG: 1 INJECTION, SOLUTION INTRAMUSCULAR; INTRAVENOUS; SUBCUTANEOUS at 06:04

## 2024-01-01 RX ADMIN — ONDANSETRON 4 MG: 4 TABLET, ORALLY DISINTEGRATING ORAL at 12:04

## 2024-01-01 RX ADMIN — OXYCODONE HYDROCHLORIDE 15 MG: 10 TABLET ORAL at 07:04

## 2024-01-01 RX ADMIN — ONDANSETRON 4 MG: 2 INJECTION INTRAMUSCULAR; INTRAVENOUS at 09:04

## 2024-01-01 RX ADMIN — INDIGO CARMINE 5 ML: 8 INJECTION, SOLUTION INTRAMUSCULAR; INTRAVENOUS at 09:04

## 2024-01-01 RX ADMIN — DICYCLOMINE HYDROCHLORIDE 20 MG: 20 TABLET ORAL at 02:04

## 2024-01-01 RX ADMIN — SODIUM CHLORIDE: 9 INJECTION, SOLUTION INTRAVENOUS at 06:04

## 2024-01-01 RX ADMIN — SODIUM CHLORIDE: 9 INJECTION, SOLUTION INTRAVENOUS at 11:03

## 2024-01-01 RX ADMIN — OXYCODONE HYDROCHLORIDE 15 MG: 10 TABLET ORAL at 11:04

## 2024-01-01 RX ADMIN — OXYCODONE HYDROCHLORIDE 20 MG: 10 TABLET, FILM COATED, EXTENDED RELEASE ORAL at 10:04

## 2024-01-01 RX ADMIN — DOCUSATE SODIUM AND SENNOSIDES 1 TABLET: 8.6; 5 TABLET, FILM COATED ORAL at 10:04

## 2024-01-01 RX ADMIN — BEVACIZUMAB-AWWB 600 MG: 400 INJECTION, SOLUTION INTRAVENOUS at 10:03

## 2024-01-01 RX ADMIN — Medication 500 UNITS: at 11:03

## 2024-01-01 RX ADMIN — MORPHINE SULFATE 4 MG: 4 INJECTION INTRAVENOUS at 04:04

## 2024-01-02 PROBLEM — N90.89 VULVAL LESION: Status: ACTIVE | Noted: 2024-01-02

## 2024-01-02 NOTE — Clinical Note
Shaun jim, She has pretty bad tooth abscess. I started Ceftin and Flagyl since she has allergy to PCN and told her to see her dentist. She has bad teeth Also se has this pedunculated lesion on her vulva, I took a pic. Getting her with Gyn/onc

## 2024-01-02 NOTE — PROGRESS NOTES
Subjective     Patient ID: Gail Mckinnon is a 53 y.o. female.    Chief Complaint: Malignant neoplasm of sigmoid colon        Oncology History   Malignant neoplasm of sigmoid colon   3/16/2020 Initial Diagnosis     Malignant neoplasm of sigmoid colon      3/31/2020 Cancer Staged     Staging form: Colon and Rectum, AJCC 8th Edition  - Clinical stage from 3/31/2020: Stage IIIC (cT4b, cN2a, cM0)      5/6/2020 - 11/17/2020 Chemotherapy     Treatment Summary   Plan Name: OP FOLFOX 6 Q2W  Treatment Goal: Curative  Status: Inactive  Start Date: 5/6/2020  End Date: 11/6/2020  Provider: Dylan Leyva MD  Chemotherapy: fluorouraciL injection 945 mg, 400 mg/m2 = 945 mg, Intravenous, Clinic/H[facility] 1 time, 14 of 14 cycles  Administration: 945 mg (5/6/2020), 945 mg (5/20/2020), 945 mg (6/3/2020), 945 mg (6/17/2020), 945 mg (7/29/2020), 945 mg (8/12/2020), 945 mg (8/26/2020), 945 mg (9/9/2020), 945 mg (9/23/2020), 945 mg (10/6/2020), 945 mg (10/21/2020), 945 mg (11/4/2020)  fluorouraciL 2,400 mg/m2 = 5,665 mg in sodium chloride 0.9% 240 mL chemo infusion, 2,400 mg/m2 = 5,665 mg, Intravenous, Over 46 hours, 14 of 14 cycles  Administration: 5,665 mg (5/6/2020), 5,665 mg (5/20/2020), 5,665 mg (6/3/2020), 5,665 mg (6/17/2020), 5,665 mg (7/29/2020), 5,665 mg (8/12/2020), 5,665 mg (8/26/2020), 5,665 mg (9/9/2020), 5,665 mg (9/23/2020), 5,665 mg (10/6/2020), 5,665 mg (10/21/2020), 5,665 mg (11/4/2020)  leucovorin calcium 900 mg in dextrose 5 % 250 mL infusion, 945 mg, Intravenous, Clinic/[facility] 1 time, 14 of 14 cycles  Administration: 900 mg (5/6/2020), 900 mg (5/20/2020), 900 mg (6/3/2020), 900 mg (6/17/2020), 945 mg (6/30/2020), 945 mg (7/20/2020), 945 mg (7/29/2020), 945 mg (8/12/2020), 945 mg (8/26/2020), 945 mg (9/9/2020), 945 mg (9/23/2020), 945 mg (10/6/2020), 945 mg (10/21/2020), 945 mg (11/4/2020)  oxaliplatin (ELOXATIN) 200 mg in dextrose 5 % 500 mL chemo infusion, 201 mg, Intravenous, Clinic/[facility] 1 time, 6 of 6 cycles  Dose  modification: 65 mg/m2 (original dose 85 mg/m2, Cycle 6)  Administration: 200 mg (5/6/2020), 200 mg (5/20/2020), 200 mg (6/3/2020), 200 mg (6/17/2020), 201 mg (6/30/2020), 150 mg (7/20/2020)      5/3/2021 Cancer Staged     Staging form: Colon and Rectum, AJCC 8th Edition  - Clinical stage from 5/3/2021: Stage Unknown (rcT0, cNX, cM1)      6/16/2021 - 8/2/2023 Chemotherapy     Treatment Summary   Plan Name: OP COLORECTAL FOLFIRI + BEVACIZUMAB Q2W  Treatment Goal: Control  Status: Inactive  Start Date: 6/16/2021  End Date: 8/2/2023  Provider: Marah Santo MD  Chemotherapy: fluorouraciL injection 990 mg, 400 mg/m2 = 990 mg, Intravenous, Clinic/[facility] 1 time, 15 of 15 cycles  Administration: 990 mg (6/16/2021), 990 mg (6/30/2021), 990 mg (7/14/2021), 980 mg (7/28/2021), 990 mg (8/11/2021), 990 mg (8/25/2021), 990 mg (9/8/2021), 990 mg (9/22/2021), 990 mg (10/6/2021), 990 mg (10/20/2021), 990 mg (11/3/2021), 990 mg (12/1/2021), 990 mg (12/15/2021), 990 mg (11/17/2021), 990 mg (12/28/2021)  fluorouraciL 2,400 mg/m2 = 5,950 mg in sodium chloride 0.9% 240 mL chemo infusion, 2,400 mg/m2 = 5,950 mg, Intravenous, Over 46 hours, 43 of 46 cycles  Administration: 5,950 mg (6/16/2021), 5,950 mg (6/30/2021), 5,950 mg (7/14/2021), 5,880 mg (7/28/2021), 5,930 mg (8/11/2021), 5,930 mg (8/25/2021), 5,930 mg (9/8/2021), 5,930 mg (9/22/2021), 5,930 mg (10/6/2021), 5,930 mg (10/20/2021), 5,930 mg (11/3/2021), 5,930 mg (12/1/2021), 5,930 mg (12/15/2021), 5,930 mg (11/17/2021), 5,930 mg (12/28/2021), 6,050 mg (5/11/2022), 6,025 mg (3/9/2022), 6,025 mg (2/23/2022), 5,930 mg (2/2/2022), 5,930 mg (1/19/2022), 6,050 mg (4/20/2022), 6,025 mg (4/6/2022), 6,025 mg (3/23/2022), 6,050 mg (6/1/2022), 6,050 mg (6/15/2022), 6,050 mg (10/19/2022), 6,050 mg (10/5/2022), 6,050 mg (11/2/2022), 6,050 mg (11/16/2022), 6,050 mg (11/30/2022), 6,050 mg (1/4/2023), 6,050 mg (12/19/2022), 6,050 mg (1/18/2023), 6,000 mg (5/22/2023), 6,000 mg (4/26/2023), 6,000 mg  (4/11/2023), 6,000 mg (2/28/2023), 6,050 mg (2/14/2023), 6,000 mg (2/1/2023), 6,000 mg (7/5/2023), 6,000 mg (6/19/2023), 6,000 mg (6/5/2023), 6,000 mg (7/31/2023)  bevacizumab (AVASTIN) 600 mg in sodium chloride 0.9% 100 mL chemo infusion, 660 mg, Intravenous, [facility] 1 time, 36 of 36 cycles  Dose modification: 5 mg/kg (original dose 5 mg/kg, Cycle 26, Reason: MD Discretion, Comment: 5 mg/kg original dose)  Administration: 600 mg (6/30/2021), 600 mg (7/14/2021), 600 mg (7/28/2021), 600 mg (6/16/2021), 600 mg (8/11/2021), 655 mg (8/25/2021), 600 mg (9/8/2021), 600 mg (9/22/2021), 600 mg (10/6/2021), 600 mg (10/20/2021), 600 mg (11/3/2021), 655 mg (12/1/2021), 600 mg (12/15/2021), 600 mg (11/17/2021), 600 mg (12/28/2021), 600 mg (5/11/2022), 600 mg (3/9/2022), 600 mg (2/23/2022), 600 mg (2/2/2022), 600 mg (1/19/2022), 600 mg (4/20/2022), 680 mg (4/6/2022), 600 mg (3/23/2022), 680 mg (10/5/2022), 680 mg (10/19/2022), 680 mg (11/2/2022), 680 mg (11/16/2022), 680 mg (11/30/2022), 680 mg (1/4/2023), 680 mg (12/19/2022), 680 mg (1/18/2023), 680 mg (3/28/2023), 680 mg (3/14/2023), 680 mg (2/28/2023), 680 mg (2/14/2023), 680 mg (2/1/2023)  irinotecan (CAMPTOSAR) 440 mg in sodium chloride 0.9% 500 mL chemo infusion, 446 mg, Intravenous, [facility] 1 time, 45 of 48 cycles  Dose modification: 180 mg/m2 (original dose 180 mg/m2, Cycle 24)  Administration: 440 mg (6/16/2021), 440 mg (6/30/2021), 440 mg (7/14/2021), 440 mg (7/28/2021), 440 mg (8/11/2021), 444 mg (8/25/2021), 440 mg (9/8/2021), 440 mg (9/22/2021), 440 mg (10/6/2021), 440 mg (10/20/2021), 440 mg (11/3/2021), 440 mg (12/1/2021), 440 mg (12/15/2021), 440 mg (11/17/2021), 440 mg (12/28/2021), 440 mg (5/11/2022), 440 mg (3/9/2022), 440 mg (2/23/2022), 440 mg (2/2/2022), 440 mg (1/19/2022), 440 mg (4/20/2022), 440 mg (4/6/2022), 440 mg (3/24/2022), 440 mg (6/1/2022), 440 mg (6/15/2022), 440 mg (10/19/2022), 440 mg (10/5/2022), 440 mg (11/2/2022), 440 mg (11/16/2022),  440 mg (11/30/2022), 440 mg (1/4/2023), 440 mg (12/19/2022), 440 mg (1/18/2023), 440 mg (5/22/2023), 440 mg (4/26/2023), 440 mg (4/11/2023), 440 mg (3/28/2023), 440 mg (3/14/2023), 440 mg (2/28/2023), 440 mg (2/14/2023), 440 mg (2/1/2023), 440 mg (7/5/2023), 440 mg (6/19/2023), 440 mg (6/5/2023), 440 mg (7/31/2023)  bevacizumab-awwb (MVASI) 5 mg/kg = 680 mg in sodium chloride 0.9% 100 mL infusion, 5 mg/kg = 680 mg (100 % of original dose 5 mg/kg), Intravenous, Clinic/[facility] 1 time, 7 of 10 cycles  Dose modification: 5 mg/kg (original dose 5 mg/kg, Cycle 39)  Administration: 680 mg (4/11/2023), 680 mg (4/26/2023), 680 mg (5/22/2023), 680 mg (7/5/2023), 680 mg (6/19/2023), 680 mg (6/5/2023), 680 mg (7/31/2023)      8/17/2023 - 8/18/2023 Chemotherapy     Treatment Summary   Plan Name: OP CETUXIMAB 500MG Q2W  Treatment Goal: Control  Status: Inactive  Start Date:   End Date:   Provider: Marah Santo MD  Chemotherapy: [No matching medication found in this treatment plan]      8/24/2023 -  Chemotherapy     Treatment Summary   Plan Name: [facility] - ARM 1 ENCORAFENIB  CETUXIMAB NIVOLUMAB  Treatment Goal: Palliative  Status: Active  Start Date: 8/24/2023  End Date: 7/12/2024 (Planned)  Provider: Marah Santo MD  Chemotherapy: cetuximab (ERBITUX) 100 mg/50 mL chemo infusion 1,200 mg, 1,230 mg, Intravenous, Clinic/[facility] 1 time, 5 of 12 cycles  Administration: 1,200 mg (8/24/2023), 1,200 mg (9/8/2023), 1,200 mg (9/22/2023), 1,200 mg (10/6/2023), 1,200 mg (10/20/2023), 1,200 mg (11/3/2023), 1,200 mg (11/17/2023), 1,200 mg (12/1/2023), 1,200 mg (12/15/2023)  encorafenib 75 mg Cap, 300 mg, Oral, Daily, 1 of 1 cycle, Start date: --, End date: --      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   4/3/2020 Initial Diagnosis     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      5/6/2020 - 11/17/2020 Chemotherapy     Treatment Summary   Plan Name: OP FOLFOX 6 Q2W  Treatment Goal: Curative  Status: Inactive  Start Date: 5/6/2020  End Date:  11/6/2020  Provider: Dylan Leyva MD  Chemotherapy: fluorouraciL injection 945 mg, 400 mg/m2 = 945 mg, Intravenous, Clinic/[facility] 1 time, 14 of 14 cycles  Administration: 945 mg (5/6/2020), 945 mg (5/20/2020), 945 mg (6/3/2020), 945 mg (6/17/2020), 945 mg (7/29/2020), 945 mg (8/12/2020), 945 mg (8/26/2020), 945 mg (9/9/2020), 945 mg (9/23/2020), 945 mg (10/6/2020), 945 mg (10/21/2020), 945 mg (11/4/2020)  fluorouraciL 2,400 mg/m2 = 5,665 mg in sodium chloride 0.9% 240 mL chemo infusion, 2,400 mg/m2 = 5,665 mg, Intravenous, Over 46 hours, 14 of 14 cycles  Administration: 5,665 mg (5/6/2020), 5,665 mg (5/20/2020), 5,665 mg (6/3/2020), 5,665 mg (6/17/2020), 5,665 mg (7/29/2020), 5,665 mg (8/12/2020), 5,665 mg (8/26/2020), 5,665 mg (9/9/2020), 5,665 mg (9/23/2020), 5,665 mg (10/6/2020), 5,665 mg (10/21/2020), 5,665 mg (11/4/2020)  leucovorin calcium 900 mg in dextrose 5 % 250 mL infusion, 945 mg, Intravenous, Clinic/[facility] 1 time, 14 of 14 cycles  Administration: 900 mg (5/6/2020), 900 mg (5/20/2020), 900 mg (6/3/2020), 900 mg (6/17/2020), 945 mg (6/30/2020), 945 mg (7/20/2020), 945 mg (7/29/2020), 945 mg (8/12/2020), 945 mg (8/26/2020), 945 mg (9/9/2020), 945 mg (9/23/2020), 945 mg (10/6/2020), 945 mg (10/21/2020), 945 mg (11/4/2020)  oxaliplatin (ELOXATIN) 200 mg in dextrose 5 % 500 mL chemo infusion, 201 mg, Intravenous, Clinic/[facility] 1 time, 6 of 6 cycles  Dose modification: 65 mg/m2 (original dose 85 mg/m2, Cycle 6)  Administration: 200 mg (5/6/2020), 200 mg (5/20/2020), 200 mg (6/3/2020), 200 mg (6/17/2020), 201 mg (6/30/2020), 150 mg (7/20/2020)      6/16/2021 - 8/2/2023 Chemotherapy     Treatment Summary   Plan Name: OP COLORECTAL FOLFIRI + BEVACIZUMAB Q2W  Treatment Goal: Control  Status: Inactive  Start Date: 6/16/2021  End Date: 8/2/2023  Provider: Marah Santo MD  Chemotherapy: fluorouraciL injection 990 mg, 400 mg/m2 = 990 mg, Intravenous, Clinic/[facility] 1 time, 15 of 15 cycles  Administration: 990 mg  (6/16/2021), 990 mg (6/30/2021), 990 mg (7/14/2021), 980 mg (7/28/2021), 990 mg (8/11/2021), 990 mg (8/25/2021), 990 mg (9/8/2021), 990 mg (9/22/2021), 990 mg (10/6/2021), 990 mg (10/20/2021), 990 mg (11/3/2021), 990 mg (12/1/2021), 990 mg (12/15/2021), 990 mg (11/17/2021), 990 mg (12/28/2021)  fluorouraciL 2,400 mg/m2 = 5,950 mg in sodium chloride 0.9% 240 mL chemo infusion, 2,400 mg/m2 = 5,950 mg, Intravenous, Over 46 hours, 43 of 46 cycles  Administration: 5,950 mg (6/16/2021), 5,950 mg (6/30/2021), 5,950 mg (7/14/2021), 5,880 mg (7/28/2021), 5,930 mg (8/11/2021), 5,930 mg (8/25/2021), 5,930 mg (9/8/2021), 5,930 mg (9/22/2021), 5,930 mg (10/6/2021), 5,930 mg (10/20/2021), 5,930 mg (11/3/2021), 5,930 mg (12/1/2021), 5,930 mg (12/15/2021), 5,930 mg (11/17/2021), 5,930 mg (12/28/2021), 6,050 mg (5/11/2022), 6,025 mg (3/9/2022), 6,025 mg (2/23/2022), 5,930 mg (2/2/2022), 5,930 mg (1/19/2022), 6,050 mg (4/20/2022), 6,025 mg (4/6/2022), 6,025 mg (3/23/2022), 6,050 mg (6/1/2022), 6,050 mg (6/15/2022), 6,050 mg (10/19/2022), 6,050 mg (10/5/2022), 6,050 mg (11/2/2022), 6,050 mg (11/16/2022), 6,050 mg (11/30/2022), 6,050 mg (1/4/2023), 6,050 mg (12/19/2022), 6,050 mg (1/18/2023), 6,000 mg (5/22/2023), 6,000 mg (4/26/2023), 6,000 mg (4/11/2023), 6,000 mg (2/28/2023), 6,050 mg (2/14/2023), 6,000 mg (2/1/2023), 6,000 mg (7/5/2023), 6,000 mg (6/19/2023), 6,000 mg (6/5/2023), 6,000 mg (7/31/2023)  bevacizumab (AVASTIN) 600 mg in sodium chloride 0.9% 100 mL chemo infusion, 660 mg, Intravenous, [facility] 1 time, 36 of 36 cycles  Dose modification: 5 mg/kg (original dose 5 mg/kg, Cycle 26, Reason: MD Discretion, Comment: 5 mg/kg original dose)  Administration: 600 mg (6/30/2021), 600 mg (7/14/2021), 600 mg (7/28/2021), 600 mg (6/16/2021), 600 mg (8/11/2021), 655 mg (8/25/2021), 600 mg (9/8/2021), 600 mg (9/22/2021), 600 mg (10/6/2021), 600 mg (10/20/2021), 600 mg (11/3/2021), 655 mg (12/1/2021), 600 mg (12/15/2021), 600 mg  (11/17/2021), 600 mg (12/28/2021), 600 mg (5/11/2022), 600 mg (3/9/2022), 600 mg (2/23/2022), 600 mg (2/2/2022), 600 mg (1/19/2022), 600 mg (4/20/2022), 680 mg (4/6/2022), 600 mg (3/23/2022), 680 mg (10/5/2022), 680 mg (10/19/2022), 680 mg (11/2/2022), 680 mg (11/16/2022), 680 mg (11/30/2022), 680 mg (1/4/2023), 680 mg (12/19/2022), 680 mg (1/18/2023), 680 mg (3/28/2023), 680 mg (3/14/2023), 680 mg (2/28/2023), 680 mg (2/14/2023), 680 mg (2/1/2023)  irinotecan (CAMPTOSAR) 440 mg in sodium chloride 0.9% 500 mL chemo infusion, 446 mg, Intravenous, [facility] 1 time, 45 of 48 cycles  Dose modification: 180 mg/m2 (original dose 180 mg/m2, Cycle 24)  Administration: 440 mg (6/16/2021), 440 mg (6/30/2021), 440 mg (7/14/2021), 440 mg (7/28/2021), 440 mg (8/11/2021), 444 mg (8/25/2021), 440 mg (9/8/2021), 440 mg (9/22/2021), 440 mg (10/6/2021), 440 mg (10/20/2021), 440 mg (11/3/2021), 440 mg (12/1/2021), 440 mg (12/15/2021), 440 mg (11/17/2021), 440 mg (12/28/2021), 440 mg (5/11/2022), 440 mg (3/9/2022), 440 mg (2/23/2022), 440 mg (2/2/2022), 440 mg (1/19/2022), 440 mg (4/20/2022), 440 mg (4/6/2022), 440 mg (3/24/2022), 440 mg (6/1/2022), 440 mg (6/15/2022), 440 mg (10/19/2022), 440 mg (10/5/2022), 440 mg (11/2/2022), 440 mg (11/16/2022), 440 mg (11/30/2022), 440 mg (1/4/2023), 440 mg (12/19/2022), 440 mg (1/18/2023), 440 mg (5/22/2023), 440 mg (4/26/2023), 440 mg (4/11/2023), 440 mg (3/28/2023), 440 mg (3/14/2023), 440 mg (2/28/2023), 440 mg (2/14/2023), 440 mg (2/1/2023), 440 mg (7/5/2023), 440 mg (6/19/2023), 440 mg (6/5/2023), 440 mg (7/31/2023)  bevacizumab-awwb (MVASI) 5 mg/kg = 680 mg in sodium chloride 0.9% 100 mL infusion, 5 mg/kg = 680 mg (100 % of original dose 5 mg/kg), Intravenous, Clinic/[facility] 1 time, 7 of 10 cycles  Dose modification: 5 mg/kg (original dose 5 mg/kg, Cycle 39)  Administration: 680 mg (4/11/2023), 680 mg (4/26/2023), 680 mg (5/22/2023), 680 mg (7/5/2023), 680 mg (6/19/2023), 680 mg (6/5/2023),  680 mg (7/31/2023)      8/17/2023 - 8/18/2023 Chemotherapy     Treatment Summary   Plan Name: OP CETUXIMAB 500MG Q2W  Treatment Goal: Control  Status: Inactive  Start Date:   End Date:   Provider: Marah Santo MD  Chemotherapy: [No matching medication found in this treatment plan]      8/24/2023 -  Chemotherapy     Treatment Summary   Plan Name: [place] [id number]- ARM 1 ENCORAFENIB  CETUXIMAB NIVOLUMAB  Treatment Goal: Palliative  Status: Active  Start Date: 8/24/2023  End Date: 7/12/2024 (Planned)  Provider: Maarh Santo MD  Chemotherapy: cetuximab (ERBITUX) 100 mg/50 mL chemo infusion 1,200 mg, 1,230 mg, Intravenous, Clinic/[facility] 1 time, 5 of 12 cycles  Administration: 1,200 mg (8/24/2023), 1,200 mg (9/8/2023), 1,200 mg (9/22/2023), 1,200 mg (10/6/2023), 1,200 mg (10/20/2023), 1,200 mg (11/3/2023), 1,200 mg (11/17/2023), 1,200 mg (12/1/2023), 1,200 mg (12/15/2023)  encorafenib 75 mg Cap, 300 mg, Oral, Daily, 1 of 1 cycle, Start date: --, End date: --            Cory Mckinnon is a 53 y.o. female with metastatic colorectal cancer who presents for Cycle 5D15 of [facility]  on 12/29/2023   She notes tooth abscess in the right lower jaw X 4 days, No fever but has a headache.  Has not reached out to her dentist    Also notes a cyst on on her vulva which has been present for about a month     Review of Systems   Constitutional:  Negative for appetite change, fatigue and unexpected weight change.   HENT:  Positive for dental problem. Negative for mouth sores.    Eyes:  Negative for visual disturbance.   Respiratory:  Negative for cough and shortness of breath.    Cardiovascular:  Negative for chest pain.   Gastrointestinal:  Negative for abdominal pain and diarrhea.   Genitourinary:  Negative for frequency.   Musculoskeletal:  Negative for back pain.   Integumentary:  Negative for rash.   Neurological:  Negative for headaches.   Hematological:  Negative for adenopathy.   Psychiatric/Behavioral:  The patient is not  nervous/anxious.    All other systems reviewed and are negative.         Objective     Physical Exam  HENT:      Mouth/Throat:      Comments: A pus pocket noted in the gingiva on the right lower gum area   Genitourinary:     Comments: Polypoid mass noted on the labia (see pic)         Abscess + at the lower right tooth.     Assessment and Plan     1. Tooth abscess  -     cefUROXime (CEFTIN) 500 MG tablet; Take 1 tablet (500 mg total) by mouth every 12 (twelve) hours. for 10 days  Dispense: 20 tablet; Refill: 0  -     metroNIDAZOLE (FLAGYL) 500 MG tablet; Take 1 tablet (500 mg total) by mouth every 12 (twelve) hours. for 10 days  Dispense: 30 tablet; Refill: 0    2. Vulval lesion  -     Ambulatory referral/consult to Gynecologic Oncology; Future; Expected date: 01/09/2024    3. Malignant neoplasm of sigmoid colon    4. Secondary adenocarcinoma of lymph node    5. Secondary malignant neoplasm of retroperitoneum  Overview:  Formatting of this note might be different from the original.  Added automatically from request for surgery [id number]          Route Chart for Scheduling    Med Onc Chart Routing      Follow up with physician . Schedule with Gyn/onc here in [facility]   Follow up with [facility]    Infusion scheduling note    Injection scheduling note    Labs    Imaging    Pharmacy appointment    Other referrals            Treatment Plan Information   [facility] - ARM 1 ENCORAFENIB  CETUXIMAB NIVOLUMAB   Marah Santo MD   Upcoming Treatment Dates - [facility] - ARM 1 ENCORAFENIB  CETUXIMAB NIVOLUMAB    1/12/2024       Pre-Medications       ondansetron injection 8 mg       hydrocortisone sodium succinate injection 50 mg       diphenhydrAMINE (BENADRYL) 50 mg in NS 50 mL IVPB       acetaminophen tablet 650 mg       Chemotherapy       cetuximab (ERBITUX) 100 mg/50 mL chemo infusion 1,230 mg       INV nivolumab 480 mg in INV sodium chloride 0.9 % 100 mL chemo infusion  1/26/2024       Pre-Medications       ondansetron  injection 8 mg       hydrocortisone sodium succinate injection 50 mg       diphenhydrAMINE (BENADRYL) 50 mg in NS 50 mL IVPB       acetaminophen tablet 650 mg       Chemotherapy       cetuximab (ERBITUX) 100 mg/50 mL chemo infusion 1,230 mg  2/9/2024       Pre-Medications       ondansetron injection 8 mg       hydrocortisone sodium succinate injection 50 mg       diphenhydrAMINE (BENADRYL) 50 mg in NS 50 mL IVPB       acetaminophen tablet 650 mg       Chemotherapy       cetuximab (ERBITUX) 100 mg/50 mL chemo infusion 1,230 mg       INV nivolumab 480 mg in INV sodium chloride 0.9 % 100 mL chemo infusion  2/23/2024       Pre-Medications       ondansetron injection 8 mg       hydrocortisone sodium succinate injection 50 mg       diphenhydrAMINE (BENADRYL) 50 mg in NS 50 mL IVPB       acetaminophen tablet 650 mg       Chemotherapy       cetuximab (ERBITUX) 100 mg/50 mL chemo infusion 1,230 mg    Supportive Plan Information  IV FLUIDS AND ELECTROLYTES   Brayden Solo MD   Upcoming Treatment Dates - IV FLUIDS AND ELECTROLYTES    No upcoming days in selected categories.    Therapy Plan Information  PORT FLUSH  Flushes  heparin, porcine (PF) 100 unit/mL injection flush 500 Units  500 Units, Intravenous, Every visit  sodium chloride 0.9% flush 10 mL  10 mL, Intravenous, Every visit    Dental abscess:  Started her on Ceftin and Flagyl.  Asked her to reach out to her disc to see if she needs root canal  Vulvar/labia lesion:  See image above, she has a pedunculated lesion on her vulva which requires further evaluation referral placed for gynecologic oncology    Send message to Dr. Santo as well who is the patient's primary oncologist    Above plan reviewed with the patient and all of her questions were answered to her satisfaction

## 2024-01-04 NOTE — TELEPHONE ENCOUNTER
Called pt to get her scheduled with more PT apts, pt stated that she hasn't been feeling well and that she would like to cont her Tues and Thurs schedule

## 2024-01-04 NOTE — NURSING
New referral received from Dr Pimentel for recent diagnosis of vulvar cyst noted that has been present x1 month. Pt has a history of colon cancer that was diagnosed in 2020. Pt called and name and  verified. Explained my role as nurse navigator and direct number provided. Options for GYN/ONC providers, all clinic locations and soonest availability discussed with pt. Pt scheduled to see Dr العلي in the next available [facility] appointment. Patient encouraged to call for any questions or concerns about her care or appointments. Pt verbalized understanding. Date time and location of appointment reviewed with pt.     Oncology Navigation   Intake  Date of Diagnosis: 20  Cancer Type: GI; Gynecologic (Malignant neoplasm of sigmoid colon dx 3/31/20, vulvar cyst)  Internal / External Referral: Internal (Dr Pimentel (Dr Flores pt))  Date of Referral: 24  Initial Nurse Navigator Contact: 24  Referral to Initial Contact Timeline (days): 1  Date Worked: 24  First Appointment Available: 24  Appointment Date: 24  First Available Date vs. Scheduled Date (days): 0     Treatment  Current Status: Active       Medical Oncologist: Marah Santo MD  Chemotherapy: Planned  Chemotherapy Regimen: OP COLORECTAL FOLFIRI + BEVACIZUMAB Q2W       Procedures: CT  CT Schedule Date: 24            Acuity      Follow Up  No follow-ups on file.

## 2024-01-05 NOTE — TELEPHONE ENCOUNTER
Pt contacted navigator with update and need to cancel appointment for today. Pt reports that she is not feeling well and the tooth abscess she has is painful. Pt rescheduled to next available [facility] appointment. Date, time and location reviewed with pt.

## 2024-01-05 NOTE — PROGRESS NOTES
Gail Mckinnno MRN [id number]  PID# [id number]     Protocol: SWOG   IRB#: 2023.047  : NGUYEN Degroot MD  Treating Investigator: JESUS Santo MD     Randomized Phase II Trial of Encorafenib and Cetuximab with or Without Nivolumab (NSC #[id number]) for Patients with Previously Treated, Microsatellite Stable BRAF V600E Metastatic and/or Unresectable Colorectal Cancer   Cycle 1 Day 15 Arm 1/ Nivolumab + Cetuximab + Encorafenib therapy.     05JAN2024 CRC NOTE    Cycle 5 Day 22     As planned, the patient had repeat CT C/A/P on 04JAN2024 to evaluate disease status, see [id number] treating MD and CRC notes.    Following review of the 04JAN2024 CT Scan, the treating MD continues to deem patient as clinically benefiting from protocol treatment and appropriate to continue protocol therapy, i.e., daily Encorafenib. (See attached source documents.)  Currently, the patient is DAY 22 of cycle 5. Next cycle of protocol therapy scheduled for 12JAN2024.     RECIST stable- Concerns of possible progression of disease by the treating MD. Per the treating MD, the patient's case is scheduled to be presented at 17JAN2024 GI tumor conference, and Early Phase Tumor Board planned for 10JAN2024.  Next appointment with the treating MD is scheduled for 11JAN2024, the treating MD plans to review lab results, physical exam, and scan results with patient to determine treatment plans.    The patient was seen by another physician, Dr. Pimentel, earlier this week, and diagnosed with a Grade 2 Tooth abscess and Grade 2 Vulval Infection and prescribed oral meds. See [facility]Med list.     Via today's phone call to the patient by this CRC, the patient was alert, oriented with appropriate mood, and agrees to continue participation in the reference study. The patient admits to improvement of tooth abscess symptoms and denies acute worsening of vulva mass, does report oozing of the lesion, but without increase in discomfort. The patient admits to having  started both the Metronidazole and Ceftin on 02JAN2024. These AE were reviewed with the treating MD on 04JAN2024, who does not deem related to protocol therapy, no additional new orders given, and continue to monitor, and keep GYNONC appointment for 09JAN2023 to evaluate vulva lesion.     The patient voiced understanding to continue daily Encorafenib, keep upcoming appointments, and if symptoms related to her tooth or vulval lesion worsen over the weekend to contact the after-hours phone number for the on-call physician, or if needed proceed to the ED.     Next Every 8-week CT C/A/P (Section 7.5): Due January 29th, 2024- Not scheduled.     Cycle 6 Day 1:     11JAN2024 Labs @ 12:30  11JAN2024 OV with Dr. Santo @ 14:00  12Jan2024 C6 Day 1 @ 0830      CHANDRIKA Adams RN

## 2024-01-09 NOTE — PROGRESS NOTES
"GYN ONC   SUBJECTIVE:     Chief Complaint: vulvar mass  History of Present Illness:  Gail Mckinnon is a 53 y.o. carries diagnosis of recurrent widely metastatic colon cancer presenting to gyn oncology because she noted a vulvar cyst about 6 weeks ago. She thought it was a pimple originally and tried to "pop" it. It continued to grow. Mentioned to her heme/onc, who referred her here. Previous hyst/BSO at time of resection for colon cancer by Dr. Brady.     Oncology History   Malignant neoplasm of sigmoid colon   3/16/2020 Initial Diagnosis    Malignant neoplasm of sigmoid colon     3/31/2020 Cancer Staged    Staging form: Colon and Rectum, AJCC 8th Edition  - Clinical stage from 3/31/2020: Stage IIIC (cT4b, cN2a, cM0)     5/6/2020 - 11/17/2020 Chemotherapy    Treatment Summary   Plan Name: OP FOLFOX 6 Q2W  Treatment Goal: Curative  Status: Inactive  Start Date: 5/6/2020  End Date: 11/6/2020  Provider: Dylan Leyva MD  Chemotherapy: fluorouraciL injection 945 mg, 400 mg/m2 = 945 mg, Intravenous, Clinic/[facility] 1 time, 14 of 14 cycles  Administration: 945 mg (5/6/2020), 945 mg (5/20/2020), 945 mg (6/3/2020), 945 mg (6/17/2020), 945 mg (7/29/2020), 945 mg (8/12/2020), 945 mg (8/26/2020), 945 mg (9/9/2020), 945 mg (9/23/2020), 945 mg (10/6/2020), 945 mg (10/21/2020), 945 mg (11/4/2020)  fluorouraciL 2,400 mg/m2 = 5,665 mg in sodium chloride 0.9% 240 mL chemo infusion, 2,400 mg/m2 = 5,665 mg, Intravenous, Over 46 hours, 14 of 14 cycles  Administration: 5,665 mg (5/6/2020), 5,665 mg (5/20/2020), 5,665 mg (6/3/2020), 5,665 mg (6/17/2020), 5,665 mg (7/29/2020), 5,665 mg (8/12/2020), 5,665 mg (8/26/2020), 5,665 mg (9/9/2020), 5,665 mg (9/23/2020), 5,665 mg (10/6/2020), 5,665 mg (10/21/2020), 5,665 mg (11/4/2020)  leucovorin calcium 900 mg in dextrose 5 % 250 mL infusion, 945 mg, Intravenous, [facility] 1 time, 14 of 14 cycles  Administration: 900 mg (5/6/2020), 900 mg (5/20/2020), 900 mg (6/3/2020), 900 mg " (6/17/2020), 945 mg (6/30/2020), 945 mg (7/20/2020), 945 mg (7/29/2020), 945 mg (8/12/2020), 945 mg (8/26/2020), 945 mg (9/9/2020), 945 mg (9/23/2020), 945 mg (10/6/2020), 945 mg (10/21/2020), 945 mg (11/4/2020)  oxaliplatin (ELOXATIN) 200 mg in dextrose 5 % 500 mL chemo infusion, 201 mg, Intravenous, Clinic/[facility] 1 time, 6 of 6 cycles  Dose modification: 65 mg/m2 (original dose 85 mg/m2, Cycle 6)  Administration: 200 mg (5/6/2020), 200 mg (5/20/2020), 200 mg (6/3/2020), 200 mg (6/17/2020), 201 mg (6/30/2020), 150 mg (7/20/2020)     5/3/2021 Cancer Staged    Staging form: Colon and Rectum, AJCC 8th Edition  - Clinical stage from 5/3/2021: Stage Unknown (rcT0, cNX, cM1)     6/16/2021 - 8/2/2023 Chemotherapy    Treatment Summary   Plan Name: OP COLORECTAL FOLFIRI + BEVACIZUMAB Q2W  Treatment Goal: Control  Status: Inactive  Start Date: 6/16/2021  End Date: 8/2/2023  Provider: Marha Santo MD  Chemotherapy: fluorouraciL injection 990 mg, 400 mg/m2 = 990 mg, Intravenous, Clinic/[facility] 1 time, 15 of 15 cycles  Administration: 990 mg (6/16/2021), 990 mg (6/30/2021), 990 mg (7/14/2021), 980 mg (7/28/2021), 990 mg (8/11/2021), 990 mg (8/25/2021), 990 mg (9/8/2021), 990 mg (9/22/2021), 990 mg (10/6/2021), 990 mg (10/20/2021), 990 mg (11/3/2021), 990 mg (12/1/2021), 990 mg (12/15/2021), 990 mg (11/17/2021), 990 mg (12/28/2021)  fluorouraciL 2,400 mg/m2 = 5,950 mg in sodium chloride 0.9% 240 mL chemo infusion, 2,400 mg/m2 = 5,950 mg, Intravenous, Over 46 hours, 43 of 46 cycles  Administration: 5,950 mg (6/16/2021), 5,950 mg (6/30/2021), 5,950 mg (7/14/2021), 5,880 mg (7/28/2021), 5,930 mg (8/11/2021), 5,930 mg (8/25/2021), 5,930 mg (9/8/2021), 5,930 mg (9/22/2021), 5,930 mg (10/6/2021), 5,930 mg (10/20/2021), 5,930 mg (11/3/2021), 5,930 mg (12/1/2021), 5,930 mg (12/15/2021), 5,930 mg (11/17/2021), 5,930 mg (12/28/2021), 6,050 mg (5/11/2022), 6,025 mg (3/9/2022), 6,025 mg (2/23/2022), 5,930 mg (2/2/2022), 5,930 mg (1/19/2022),  6,050 mg (4/20/2022), 6,025 mg (4/6/2022), 6,025 mg (3/23/2022), 6,050 mg (6/1/2022), 6,050 mg (6/15/2022), 6,050 mg (10/19/2022), 6,050 mg (10/5/2022), 6,050 mg (11/2/2022), 6,050 mg (11/16/2022), 6,050 mg (11/30/2022), 6,050 mg (1/4/2023), 6,050 mg (12/19/2022), 6,050 mg (1/18/2023), 6,000 mg (5/22/2023), 6,000 mg (4/26/2023), 6,000 mg (4/11/2023), 6,000 mg (2/28/2023), 6,050 mg (2/14/2023), 6,000 mg (2/1/2023), 6,000 mg (7/5/2023), 6,000 mg (6/19/2023), 6,000 mg (6/5/2023), 6,000 mg (7/31/2023)  bevacizumab (AVASTIN) 600 mg in sodium chloride 0.9% 100 mL chemo infusion, 660 mg, Intravenous, [facility] 1 time, 36 of 36 cycles  Dose modification: 5 mg/kg (original dose 5 mg/kg, Cycle 26, Reason: MD Discretion, Comment: 5 mg/kg original dose)  Administration: 600 mg (6/30/2021), 600 mg (7/14/2021), 600 mg (7/28/2021), 600 mg (6/16/2021), 600 mg (8/11/2021), 655 mg (8/25/2021), 600 mg (9/8/2021), 600 mg (9/22/2021), 600 mg (10/6/2021), 600 mg (10/20/2021), 600 mg (11/3/2021), 655 mg (12/1/2021), 600 mg (12/15/2021), 600 mg (11/17/2021), 600 mg (12/28/2021), 600 mg (5/11/2022), 600 mg (3/9/2022), 600 mg (2/23/2022), 600 mg (2/2/2022), 600 mg (1/19/2022), 600 mg (4/20/2022), 680 mg (4/6/2022), 600 mg (3/23/2022), 680 mg (10/5/2022), 680 mg (10/19/2022), 680 mg (11/2/2022), 680 mg (11/16/2022), 680 mg (11/30/2022), 680 mg (1/4/2023), 680 mg (12/19/2022), 680 mg (1/18/2023), 680 mg (3/28/2023), 680 mg (3/14/2023), 680 mg (2/28/2023), 680 mg (2/14/2023), 680 mg (2/1/2023)  irinotecan (CAMPTOSAR) 440 mg in sodium chloride 0.9% 500 mL chemo infusion, 446 mg, Intravenous, [facility] time, 45 of 48 cycles  Dose modification: 180 mg/m2 (original dose 180 mg/m2, Cycle 24)  Administration: 440 mg (6/16/2021), 440 mg (6/30/2021), 440 mg (7/14/2021), 440 mg (7/28/2021), 440 mg (8/11/2021), 444 mg (8/25/2021), 440 mg (9/8/2021), 440 mg (9/22/2021), 440 mg (10/6/2021), 440 mg (10/20/2021), 440 mg (11/3/2021), 440 mg (12/1/2021),  440 mg (12/15/2021), 440 mg (11/17/2021), 440 mg (12/28/2021), 440 mg (5/11/2022), 440 mg (3/9/2022), 440 mg (2/23/2022), 440 mg (2/2/2022), 440 mg (1/19/2022), 440 mg (4/20/2022), 440 mg (4/6/2022), 440 mg (3/24/2022), 440 mg (6/1/2022), 440 mg (6/15/2022), 440 mg (10/19/2022), 440 mg (10/5/2022), 440 mg (11/2/2022), 440 mg (11/16/2022), 440 mg (11/30/2022), 440 mg (1/4/2023), 440 mg (12/19/2022), 440 mg (1/18/2023), 440 mg (5/22/2023), 440 mg (4/26/2023), 440 mg (4/11/2023), 440 mg (3/28/2023), 440 mg (3/14/2023), 440 mg (2/28/2023), 440 mg (2/14/2023), 440 mg (2/1/2023), 440 mg (7/5/2023), 440 mg (6/19/2023), 440 mg (6/5/2023), 440 mg (7/31/2023)  bevacizumab-awwb (MVASI) 5 mg/kg = 680 mg in sodium chloride 0.9% 100 mL infusion, 5 mg/kg = 680 mg (100 % of original dose 5 mg/kg), Intravenous, Clinic/[facility] 1 time, 7 of 10 cycles  Dose modification: 5 mg/kg (original dose 5 mg/kg, Cycle 39)  Administration: 680 mg (4/11/2023), 680 mg (4/26/2023), 680 mg (5/22/2023), 680 mg (7/5/2023), 680 mg (6/19/2023), 680 mg (6/5/2023), 680 mg (7/31/2023)     8/17/2023 - 8/18/2023 Chemotherapy    Treatment Summary   Plan Name: OP CETUXIMAB 500MG Q2W  Treatment Goal: Control  Status: Inactive  Start Date:   End Date:   Provider: Marah Santo MD  Chemotherapy: [No matching medication found in this treatment plan]     8/24/2023 -  Chemotherapy    Treatment Summary   Plan Name: [facility] - ARM 1 ENCORAFENIB  CETUXIMAB NIVOLUMAB  Treatment Goal: Palliative  Status: Active  Start Date: 8/24/2023  End Date: 7/12/2024 (Planned)  Provider: Marah Santo MD  Chemotherapy: cetuximab (ERBITUX) 100 mg/50 mL chemo infusion 1,200 mg, 1,230 mg, Intravenous, Clinic/[facility] 1 time, 5 of 12 cycles  Administration: 1,200 mg (8/24/2023), 1,200 mg (9/8/2023), 1,200 mg (9/22/2023), 1,200 mg (10/6/2023), 1,200 mg (10/20/2023), 1,200 mg (11/3/2023), 1,200 mg (11/17/2023), 1,200 mg (12/1/2023), 1,200 mg (12/15/2023), 1,200 mg (12/29/2023)  encorafenib 75  mg Cap, 300 mg, Oral, Daily, 1 of 1 cycle, Start date: --, End date: --     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   4/3/2020 Initial Diagnosis    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     5/6/2020 - 11/17/2020 Chemotherapy    Treatment Summary   Plan Name: OP FOLFOX 6 Q2W  Treatment Goal: Curative  Status: Inactive  Start Date: 5/6/2020  End Date: 11/6/2020  Provider: Dylan Leyav MD  Chemotherapy: fluorouraciL injection 945 mg, 400 mg/m2 = 945 mg, Intravenous, Clinic/[facility] 1 time, 14 of 14 cycles  Administration: 945 mg (5/6/2020), 945 mg (5/20/2020), 945 mg (6/3/2020), 945 mg (6/17/2020), 945 mg (7/29/2020), 945 mg (8/12/2020), 945 mg (8/26/2020), 945 mg (9/9/2020), 945 mg (9/23/2020), 945 mg (10/6/2020), 945 mg (10/21/2020), 945 mg (11/4/2020)  fluorouraciL 2,400 mg/m2 = 5,665 mg in sodium chloride 0.9% 240 mL chemo infusion, 2,400 mg/m2 = 5,665 mg, Intravenous, Over 46 hours, 14 of 14 cycles  Administration: 5,665 mg (5/6/2020), 5,665 mg (5/20/2020), 5,665 mg (6/3/2020), 5,665 mg (6/17/2020), 5,665 mg (7/29/2020), 5,665 mg (8/12/2020), 5,665 mg (8/26/2020), 5,665 mg (9/9/2020), 5,665 mg (9/23/2020), 5,665 mg (10/6/2020), 5,665 mg (10/21/2020), 5,665 mg (11/4/2020)  leucovorin calcium 900 mg in dextrose 5 % 250 mL infusion, 945 mg, Intravenous, Clinic/[facility] 1 time, 14 of 14 cycles  Administration: 900 mg (5/6/2020), 900 mg (5/20/2020), 900 mg (6/3/2020), 900 mg (6/17/2020), 945 mg (6/30/2020), 945 mg (7/20/2020), 945 mg (7/29/2020), 945 mg (8/12/2020), 945 mg (8/26/2020), 945 mg (9/9/2020), 945 mg (9/23/2020), 945 mg (10/6/2020), 945 mg (10/21/2020), 945 mg (11/4/2020)  oxaliplatin (ELOXATIN) 200 mg in dextrose 5 % 500 mL chemo infusion, 201 mg, Intravenous, Clinic/[facility] 1 time, 6 of 6 cycles  Dose modification: 65 mg/m2 (original dose 85 mg/m2, Cycle 6)  Administration: 200 mg (5/6/2020), 200 mg (5/20/2020), 200 mg (6/3/2020), 200 mg (6/17/2020), 201 mg (6/30/2020), 150 mg (7/20/2020)     6/16/2021 - 8/2/2023  Chemotherapy    Treatment Summary   Plan Name: OP COLORECTAL FOLFIRI + BEVACIZUMAB Q2W  Treatment Goal: Control  Status: Inactive  Start Date: 6/16/2021  End Date: 8/2/2023  Provider: Marah Santo MD  Chemotherapy: fluorouraciL injection 990 mg, 400 mg/m2 = 990 mg, Intravenous, Clinic/[facility] 1 time, 15 of 15 cycles  Administration: 990 mg (6/16/2021), 990 mg (6/30/2021), 990 mg (7/14/2021), 980 mg (7/28/2021), 990 mg (8/11/2021), 990 mg (8/25/2021), 990 mg (9/8/2021), 990 mg (9/22/2021), 990 mg (10/6/2021), 990 mg (10/20/2021), 990 mg (11/3/2021), 990 mg (12/1/2021), 990 mg (12/15/2021), 990 mg (11/17/2021), 990 mg (12/28/2021)  fluorouraciL 2,400 mg/m2 = 5,950 mg in sodium chloride 0.9% 240 mL chemo infusion, 2,400 mg/m2 = 5,950 mg, Intravenous, Over 46 hours, 43 of 46 cycles  Administration: 5,950 mg (6/16/2021), 5,950 mg (6/30/2021), 5,950 mg (7/14/2021), 5,880 mg (7/28/2021), 5,930 mg (8/11/2021), 5,930 mg (8/25/2021), 5,930 mg (9/8/2021), 5,930 mg (9/22/2021), 5,930 mg (10/6/2021), 5,930 mg (10/20/2021), 5,930 mg (11/3/2021), 5,930 mg (12/1/2021), 5,930 mg (12/15/2021), 5,930 mg (11/17/2021), 5,930 mg (12/28/2021), 6,050 mg (5/11/2022), 6,025 mg (3/9/2022), 6,025 mg (2/23/2022), 5,930 mg (2/2/2022), 5,930 mg (1/19/2022), 6,050 mg (4/20/2022), 6,025 mg (4/6/2022), 6,025 mg (3/23/2022), 6,050 mg (6/1/2022), 6,050 mg (6/15/2022), 6,050 mg (10/19/2022), 6,050 mg (10/5/2022), 6,050 mg (11/2/2022), 6,050 mg (11/16/2022), 6,050 mg (11/30/2022), 6,050 mg (1/4/2023), 6,050 mg (12/19/2022), 6,050 mg (1/18/2023), 6,000 mg (5/22/2023), 6,000 mg (4/26/2023), 6,000 mg (4/11/2023), 6,000 mg (2/28/2023), 6,050 mg (2/14/2023), 6,000 mg (2/1/2023), 6,000 mg (7/5/2023), 6,000 mg (6/19/2023), 6,000 mg (6/5/2023), 6,000 mg (7/31/2023)  bevacizumab (AVASTIN) 600 mg in sodium chloride 0.9% 100 mL chemo infusion, 660 mg, Intravenous, Clinic[facility] 1 time, 36 of 36 cycles  Dose modification: 5 mg/kg (original dose 5 mg/kg, Cycle 26,  Reason: MD Discretion, Comment: 5 mg/kg original dose)  Administration: 600 mg (6/30/2021), 600 mg (7/14/2021), 600 mg (7/28/2021), 600 mg (6/16/2021), 600 mg (8/11/2021), 655 mg (8/25/2021), 600 mg (9/8/2021), 600 mg (9/22/2021), 600 mg (10/6/2021), 600 mg (10/20/2021), 600 mg (11/3/2021), 655 mg (12/1/2021), 600 mg (12/15/2021), 600 mg (11/17/2021), 600 mg (12/28/2021), 600 mg (5/11/2022), 600 mg (3/9/2022), 600 mg (2/23/2022), 600 mg (2/2/2022), 600 mg (1/19/2022), 600 mg (4/20/2022), 680 mg (4/6/2022), 600 mg (3/23/2022), 680 mg (10/5/2022), 680 mg (10/19/2022), 680 mg (11/2/2022), 680 mg (11/16/2022), 680 mg (11/30/2022), 680 mg (1/4/2023), 680 mg (12/19/2022), 680 mg (1/18/2023), 680 mg (3/28/2023), 680 mg (3/14/2023), 680 mg (2/28/2023), 680 mg (2/14/2023), 680 mg (2/1/2023)  irinotecan (CAMPTOSAR) 440 mg in sodium chloride 0.9% 500 mL chemo infusion, 446 mg, Intravenous, Clinic/[facility] 1 time, 45 of 48 cycles  Dose modification: 180 mg/m2 (original dose 180 mg/m2, Cycle 24)  Administration: 440 mg (6/16/2021), 440 mg (6/30/2021), 440 mg (7/14/2021), 440 mg (7/28/2021), 440 mg (8/11/2021), 444 mg (8/25/2021), 440 mg (9/8/2021), 440 mg (9/22/2021), 440 mg (10/6/2021), 440 mg (10/20/2021), 440 mg (11/3/2021), 440 mg (12/1/2021), 440 mg (12/15/2021), 440 mg (11/17/2021), 440 mg (12/28/2021), 440 mg (5/11/2022), 440 mg (3/9/2022), 440 mg (2/23/2022), 440 mg (2/2/2022), 440 mg (1/19/2022), 440 mg (4/20/2022), 440 mg (4/6/2022), 440 mg (3/24/2022), 440 mg (6/1/2022), 440 mg (6/15/2022), 440 mg (10/19/2022), 440 mg (10/5/2022), 440 mg (11/2/2022), 440 mg (11/16/2022), 440 mg (11/30/2022), 440 mg (1/4/2023), 440 mg (12/19/2022), 440 mg (1/18/2023), 440 mg (5/22/2023), 440 mg (4/26/2023), 440 mg (4/11/2023), 440 mg (3/28/2023), 440 mg (3/14/2023), 440 mg (2/28/2023), 440 mg (2/14/2023), 440 mg (2/1/2023), 440 mg (7/5/2023), 440 mg (6/19/2023), 440 mg (6/5/2023), 440 mg (7/31/2023)  bevacizumab-awwb (MVASI) 5 mg/kg = 680  mg in sodium chloride 0.9% 100 mL infusion, 5 mg/kg = 680 mg (100 % of original dose 5 mg/kg), Intravenous, Clinic/[facility] 1 time, 7 of 10 cycles  Dose modification: 5 mg/kg (original dose 5 mg/kg, Cycle 39)  Administration: 680 mg (4/11/2023), 680 mg (4/26/2023), 680 mg (5/22/2023), 680 mg (7/5/2023), 680 mg (6/19/2023), 680 mg (6/5/2023), 680 mg (7/31/2023)     8/17/2023 - 8/18/2023 Chemotherapy    Treatment Summary   Plan Name: OP CETUXIMAB 500MG Q2W  Treatment Goal: Control  Status: Inactive  Start Date:   End Date:   Provider: Marah Santo MD  Chemotherapy: [No matching medication found in this treatment plan]     8/24/2023 -  Chemotherapy    Treatment Summary   Plan Name: [facility] - ARM 1 ENCORAFENIB  CETUXIMAB NIVOLUMAB  Treatment Goal: Palliative  Status: Active  Start Date: 8/24/2023  End Date: 7/12/2024 (Planned)  Provider: Marah Santo MD  Chemotherapy: cetuximab (ERBITUX) 100 mg/50 mL chemo infusion 1,200 mg, 1,230 mg, Intravenous, Clinic/[facility] 1 time, 5 of 12 cycles  Administration: 1,200 mg (8/24/2023), 1,200 mg (9/8/2023), 1,200 mg (9/22/2023), 1,200 mg (10/6/2023), 1,200 mg (10/20/2023), 1,200 mg (11/3/2023), 1,200 mg (11/17/2023), 1,200 mg (12/1/2023), 1,200 mg (12/15/2023), 1,200 mg (12/29/2023)  encorafenib 75 mg Cap, 300 mg, Oral, Daily, 1 of 1 cycle, Start date: --, End date: --         Review of Systems:  Review of Systems per HPI      OBJECTIVE:     Physical Exam:  Physical Exam  Genitourinary:     Comments: 3cm fleshy mass on very thin thin stalk noted on left labia majora (photo in media tab)         ASSESSMENT:       ICD-10-CM ICD-9-CM    1. Vulvar lesion  N90.89 624.8       2. Vulval lesion  N90.89 624.8 Ambulatory referral/consult to Gynecologic Oncology      Case Request Operating Room: EXCISION-WIDE LOCAL, Exam under anesthesia      CBC W/ AUTO DIFFERENTIAL      Comprehensive Metabolic Panel      EKG 12-lead      3. Pre-op testing  Z01.818 V72.84 CBC W/ AUTO DIFFERENTIAL       Comprehensive Metabolic Panel      EKG 12-lead        I have reviewed the outside providers documentation, pathology and imaging and patient's history. I examined patient at beside.     We discussed that we need further thorough evaluation under anesthesia and that excisional biopsy warranted.  We discussed risks of surgery including bleeding, need for transfusion, infection , uterine perforation, injury to surrounding organs, cardiac events, VTE and even death. She understands and agrees to proceed. She was given the opportunity to ask questions, and stated all had been answered.         Plan:      To OR for exam under anesthesia/wide local excision of vulvar lesion on 1/6/23     A total of 60 minutes were spent on encounter including record review, imaging review, counseling patient, coordinating future care.       Eli Her MD  Gynecologic Oncology

## 2024-01-10 NOTE — Clinical Note
Adding this patient to our waitlist for Nextcure and NTX trials. No slots currently for either. Recommend starting Lonsurf+Avastin and then re-evaluate slot openings at progression.   Thank You, Padmini

## 2024-01-10 NOTE — PROGRESS NOTES
[facility] Tumor Board    Date: 1/10/2024    Patient Name: Gail Mckinnon    MRN: [id number]    Diagnosis: Metastatic Colon Cancer, NGS (5/2021) - BRAF V600E  ; Diagnosed in 3/2020 with Stage III disease treated with adjuvant FOLFOX. She subsequently had LN recurrence.    Referring Provider: Dr. Pimentel and Dr. Santo    Present PCTP Providers:     Dr. Timmy Roberto, Padmini Mckeon, NP, Dr. Fady Gomez, Dr. Igor Garcia, Lolly Dexter, NP (Dr. Rios, Dr. Rosario, Dr. Barbour)    Patient Summary:  Pathology:    Has failed Chemotherapy  Failed chemotherapy: Lines of treatment:  FOLFIRI + Avastin (6/2021-8/2023) - progression  SWOG 2107 Encorafenib / Cetuximab / Nivolumab (8/2023) - Most recent PET scan shows multiple new + enlarging lymph nodes throughout the pelvis + mediastinal adenopathy    Current treatment(s):    ECOG: Fully active, able to carry on all pre-disease performance without restriction    Molecular Workup:    Other (NGS (5/2021) - BRAF V600E)  Other Molecular Workup: NGS (5/2021) - BRAF V600E      Board Recommendations:    Standard of care recommendations: Lonsurf+Avastin   Trial recommendations: Early phase: waitlist for Nextcure and NTX. DNQ for IGM d/t prior folfiri.

## 2024-01-11 NOTE — PROGRESS NOTES
Gail Mckinnon MRN [id number]  PID# [id number]     Protocol: SWOG   IRB#: 2023.047  : NGUYEN Degroot MD  Treating Investigator: JESUS Santo MD     Randomized Phase II Trial of Encorafenib and Cetuximab with or Without Nivolumab (NSC #[id number]) for Patients with Previously Treated, Microsatellite Stable BRAF V600E Metastatic and/or Unresectable Colorectal Cancer   Cycle 1 Day 15 Arm 1/ Nivolumab + Cetuximab + Encorafenib therapy.     January 11th, 2024:  Cycle 5 Day 28    Discontinuation of Treatment Note.    Per the treating MD, the reason for discontinuation of protocol treatment, is progression of disease.    Per Section 10.5, the date of progression in the EDC will be documented as 12/04/2023.  However, it was on this date, after continued review of the patient's 01/04/2024 Repeat CT scan (per Section 10.8) including tumor conference and discussions with the colorectal team on 01/10/2024, the treating physician deemed the patient to have progression and per protocol, discussed with the patient that per protocol, protocol therapy would be discontinued.  See previous CRC notes regarding repeated imaging.     The patient presented to the planned office visit alone. The patient was alert, oriented, without noted distress, with appropriate mood and affect for the situation, and freely agreed to continued participation in the referenced study.    11JAN2024 labs: CBC w/ diff, CMP, Magnesium resulted and reviewed by the treating MD and this CRC. TSH result pending.    ConMed list: reviewed with the patient for accuracy- see shadow chart.      Patient admits to being compliant with QD dosing of encorafenib and the use of the protocol's pill diary. The patient brought in remaining #8 encorafenib and protocol pill diary.  Patient reports and documented her last dose of Encorafenib was taken this morning    Baseline AEs:  See shadow chart for full list of Baseline AEs.     Baseline Hypothyroidism- Grade 2 (Start  date 2012-continues) per documentation by Dr. Moss, the hypothyroidism resulted from treatment for hyperthyroidism. 11JAN2024  pending TSH resulted within normal range. Per endocrinologist, no dose change of synthroid.   Dr. Santo is aware Grade 2 BASELINE Hypothyroidism; does not deem related to protocol treatment, nor CS, and no new orders given. The patient denies any persistent headaches or visual changes.   See source doc communications concerning elevated 03NOV2023 TSH with WNL Free T4 with Dr. Arechiga, Study Chair, in Cycle 3 Day 15 section and Cycle 4 Day 1.     Endocrinology following TSH for patient's baseline Grade II Hypothyroidism (resulted from treatment for Hyperthyroidism). See communication from study chair regarding TSH monitoring for this patient in Cycle 1 section of shadow chart. Additional source documentation in Cycle 4 Day 1 shadow chart and linked to 16NOV2023 CRC note.       Baseline Fatigue- Grade 1- continues- pt reports being able to perform all ADLS. The treating MD is aware, does not deem as CS, no new orders given and continue to monitor.  Baseline Hypercalcemia-Grade 1 continues- corrected calcium calculation attached= 11.2 mg/dL. Endocrinologist monitoring as related to the patients BASELINE Hyperparathyroidism. Per Dr. Moss's office, no treatment or intervention currently. The treating MD is aware does not deem CS, no change in Grade, no new orders, will continue to monitor.   Baseline Hyperparathyroidism- Grade 2 Continues (Start date 2021) The treating MD is aware, does not deem CS, no new orders given. Patient seen by endocrine [id number] & discussed with the treating MD; current calcium levels were not deemed CS, no new orders, will continue to monitor.     AEs:   See shadow chart for full list of AEs.     The patient specifically denies any nausea, diarrhea, or abdominal pain.     The patient denies experiencing any infusion related reaction including chills/rigors,  nausea, vomiting, or headache during or after Cycle 5 Days 1 or 15.   Per protocol Section 7 / 7.1 & Table 3 The treating MD added hydrocortisone 50mg IV to Acetaminophen and Diphenhydramine premeds and increased infusion time of Cetuximab to four hours followed by a period of observation for previous cycles, beginning with Cycle 1 Day 15. Per the treating MD additional pre-meds and infusion extension will continue for the patient's future infusion appts.      Rash-Maculo-papular Grade 1 Start date 8/28/2023- continues. No worsening of rash noted by the treating MD with today's examination. Pt. confirms continued use of clindamycin gel and doxycycline as prescribed for sporadic areas of pruritic rash. The treating MD does not deem CS, and no new orders given.     Insomnia- Grade 2 (Start date 9/12/2023-continues) The treating MD deems as unlikely d/t to protocol therapy, and does not deem as CS, no new orders given. The patient reported intermittent use of medication for sleep. The patient was reminded to avoid Trazodone while on Encorafenib. The patient denies having completed  ordered sleep study that was ordered by endocrinologist. (30OCT2023 Dr. Moss note).      Vitamin D, 25 Hydroxy ordered per endocrinologist; reported less than normal limits-endocrine following; No CTCAE grading found. The treating MD does not relate to therapy, does not deem as CS, no new orders given. Ergocalciferol prescribed and patient admits to starting medication on 31OCT2023, and continues weekly dose as prescribed.     Cystitis noninfective- Grade 1 (Start 12/7/2023-End date ~ 12/21/2023) Pt reports dysuria and odor resolved. Admits to frequent urination but relates to increased intake of fluids. Pt. Does report discomfort in bladder area over about the past ten days.    Pain-  Grade 1 (Start date ~01/01/2024)-Grade 1 Pt. Reports intermittent mild pain in the bladder area, no requiring pain meds. No noted blood in urine. The  treating MD is aware, does not deem CS, nor related to protocol therapy. No new orders given. Discussed the possibility of endoscopy to further evaluate if pain worsens. Pt. Agreed that she does not feel the procedure is needed at this time.     Vulval Infection- Grade 2 (Start date 01/02/2024- End date 01/10/2024) The patient reports being compliant with prescribed Metronidazole, with last dose planned for 01/12/2024. Metronidazole prescribed by Dr. Pimentel on 01/02/2024. See 01/05/2024 CRC note. The treating MD does not deem CS or related to therapy. No new orders given.     Neoplasms benign, malignant, and unspecified- Vulvar Lesion- Grade 2 (Start date of approximately 11/28/2023) The patient first reported 01/02/2024 to clinic office staff via telephone. The patient was seen by Dr. Pimentel who prescribed Metronidazole (see con med list) and placed [facility] referral. The patient seen by [facility] MD on 09JAN2023, now with planned exam under anesthesia/wide local excision of vulvar lesion on 16JAN2024. The treating MD does not relate to protocol therapy, no new orders given. Will await pathology from [facility] to determine etiology.     Tooth Infection-Grade 2 (start date 12/31/202-End date 01/10/2024)) Patient initially reported to clinic office staff on 01/02/2024 via telephone encounter. The patient was scheduled and see by Dr. Pimentel who prescribed Cefuroxime. The patient admits compliance to taking the prescription, and resolution of pain and swelling. Last dose planned for tomorrow 1/12/2024. The treating MD does not relate this AE to protocol therapy, and no new orders given. Appointment made to see Dr. Ventura at the [facility] 01/24/2024.    Surgical and medical procedures - Other, specify GRADE 2 planned for 1/16/2024 by Dr. Her ([facility]) to evaluate vulvar lesion. The treating MD is concerned that the lesion could be a metastatic site.       /76  Pulse 98 Temp 97.5 °F (Temporal) Resp 18  Wt. 125.3  "kg (276 lb 3.8 oz) SpO2 97%   BSA 2.42 m² Pain Sc 0-No pain    Zubrod PS: One    Dr. Santo assessed all labs, VS & any PE findings at today's appointment.  At the discretion of the treating MD, the patient's current cardiac function continues to not require evaluation by ECG.     Following recommendations of the Early Phase tumor board, the treating MD and patient agree to proceed with non-protocol oral Lonsurf and IV Avastin. Additional plans include placing the patient on the waiting list for early phase trial, and peer to peer review with [facility].     In addition to the treating MD's discussion with the patient, this CRC reviewed the patient's understanding of the current treatment plan, and planned surgical procedure (EUA). The patient verbalized understanding and denied any questions. Support and encouragement were given to the patient, and encouraged to phone this CRC with any needed clarifications or concerns. The patient has an upcoming appointment with Palliative care for 01/18/2024, no sooner appointment available.The patient freely agrees to continue with the protocol's "at recurrence" blood and FFPE specimens, and surveillance.      The patient was escorted to the second-floor scheduling desk, after check-out she ambulated freely to the elevators without acute distress noted.      Plan:  Patient consented to collection of Whole blood (cfDNA) at progression- will plan for within the next 30 days, per study calendar.  If pathology from [facility] results as a metastatic lesion, an attempt to acquire and send FFPE is noted.  Next protocol required bio-specimen (Whole Blood) due at progression of disease. (Section 9.0 Study Calendar).   Plan to collect on 01/22/204 .  Physical and skin assessments to continue up to 6 months, per study calendar.    Noted on [place]-plan to review treating MD notes.  Per study calendar, assessments will continue to all acute AEs resolve.    CHANDRIKA Adams RN      "

## 2024-01-11 NOTE — PROGRESS NOTES
PROGRESS NOTE    Subjective:       Patient ID: Gail Mckinnon is a 53 y.o. female.  MRN: [id number]  : 1970    Chief Complaint: Metastatic colon cancer     History of Present Illness:   Gail Mckinnon is a 53 y.o. female who presents with colon cancer, initially stage III and now with LN recurrence.    On FOLIRI+ Avastin sine .     She was briefly switched to xeloda due to 5 FU shortage for a month. Did not tolerate Xeloda well due hand foot syndrome, blistering of feet, did not take the last day of Xeloda. Completed .     Resumed Folfiri + Avastin on 23. Atropine was held due to prior constipation.     Was admitted to the hospital from -23 for intractable nausea , vomiting and diarrhea as well as abdominal pain. No hematochezia or dark stool was noted.      US showed gallbladder stone and dilated CBD. She was managed conservatively. Had CT abd, pelvis, colonoscopy and MRCP that were relatively unremarkable without signs of cholecystitis. Cholecystectomy was not recommended.     She had recently resumed FOLFIRI and the symptoms started after the first cycle of resuming, never had issues prior to this.  Stayed in the hospital for 10 days.  C diff was negative. No other etiology was established. Symptoms improved over time and she was discharged on .     Interim history:  Enrolled in  SWOG 2107 (encorafenib/cetuximab and randomized to the Nivolumab arm),     Staging scans are done as per research protocol first completed on 10/9, consistent with response to treatment. Most recent repeat scans . Porgression vs pseudo progression.   We discussed her follow up scans, images reviewed at Tumor board, PET scan was recommended and completed. Four week interval scanning was recommended.     23  Has remained on encorafenib and Cetuximab.   Was seen by Dr. Pimentel on 24 for a dental infection and vulvar lesion. Referred  to Gyn onc and I have discussed with  that  there is concern for metastatic deposit, lesion will be removed.   Underwent repeat CT scans on 1/4. We have reviewed at [facility] tumor board and I have discussed the plan with the colorectal team as well.   She seems to have progressive disease with continued systemic progression    1/4/24: CT chest abd pelvis  Impression:     There is an increased but small non water density pericardial effusion.     Mediastinal and hilar lymph nodes are largely stable as are multiple pulmonary nodules..     While the recist index lesions have not significantly changed in size there is increasing mesenteric and extraperitoneal adenopathy when compared to December 4, 2023.  There     There is also increasing 4th duodenum and proximal jejunal wall thickening and adjacent inflammation.      Oncology History:  She completed adjuvant FOLFOX in November 2020. She tolerated only 4 cycles of FOLFOX before she developed an infusion reaction to oxaliplatin in cycle 5 was well as cycle 6. She then completed 6 cycles of infusional 5 FU.     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 She was offered second line therapy with FOLFIRI and Avastin.      She presented to the ED on 11/26 with left flank pain. CT renal stone study showed Moderate hydronephrosis on the left secondary to 3 mm calculus at the UPJ.    She had a PET scan mid April that showed possible progression of her disease with      She has been to [facility] for another opinion. No change was recommended in systemic therapy but she was offered surgical removal of the aorta caval nodes.  Recent sans at [facility]  show stable disease.      Surgery done 7/12/22. Received 2 more cycle of FOLFIRI without Avastin.     She had repeat imaging at [facility] on 9/24/22 that unfortunately showed disease progression since her surgery with multiple RP nodes and one mediastinal node.       PET 12/8/22  Impression:     1. Progression of  disease with interval increase of abdominal and pelvic lymphadenopathy.  2. New area of moderate FDG uptake at the right half of the T9 vertebral body (image 124).  Favor degenerative disc changes.  No underlying osteolytic or osteoblastic lesion.  Recommend continued attention on follow-up.  3. No other evidence of FDG avid disease.     12/22/22  Impression After scan comparison:     1. Metastatic portacaval and left periaortic retroperitoneal lymph nodes which remain stable between the CT of 09/24/2022 and the subsequent PET-CT of 12/08/2022.  There is no evidence of progression of metastatic disease.  2. Nonobstructing bilateral renal calculi and cholelithiasis.    2/10/22:  CT chest abd pelvis  Impression:     Stable periportal, retroperitoneal and mesenteric lymphadenopathy compared to PET-CT 12/08/2022.     Stable right middle lobe 3 mm pulmonary nodule.  No new or enlarging pulmonary nodule.     Hepatic steatosis.  Hepatosplenomegaly.     Cholelithiasis.     Bilateral nonobstructing nephrolithiasis.     Small hiatal hernia with retained contrast in the distal esophagus.  Correlate for reflux.    She had virtual visit at [facility] on 2/17/23.     She has continued FOLFIRI and Avastin.     CT abd pelvis   5/7/23  Impression:     1. Mesenteric inflammatory changes have worsened since the prior study.  2. Mesenteric, retroperitoneal and left-sided pelvic enlarged lymph nodes are unchanged.     8/11/23  CT chest abd pelvis   Impression:     1. Patient with a history of colon adenocarcinoma with worsening periaortic, retroperitoneal, mesenteric, and iliac chain lymphadenopathy the in the abdomen and pelvis when compared with the previous study suggesting worsening metastatic disease.  2. Moderate left hydronephrosis and proximal left hydroureter to the level of possible retroperitoneal scarring.  Invasion/compression of the left ureter is not excluded.  3. Bilateral nephrolithiasis  4. Hepatomegaly and hepatic  steatosis  5. Two tiny < 5 mm pulmonary nodules within the chest, unchanged.  No new pulmonary nodules.    10/9/23  CT chest abd pelvis     Impression:     Decreased size of retroperitoneal and mesenteric lymph nodes.  Stable left external iliac lymph node. Right upper lobe pulmonary nodule appears slightly increased in size.  No new pulmonary nodules.  Findings suggest mixed/partial response to therapy.     Cholelithiasis.     Hepatosplenomegaly.     Small pericardial effusion.     RECIST SUMMARY:     Date of prior examination for comparison: 08/11/2023     Lesion 1: Retroperitoneal soft tissue nodule but in the duodenum.  1.7 cm. Series 3 image 93.  Prior measurement 82.1 cm.     Lesion 2: Celiac axis lymph node.  1.5 cm. Series 3 image 56.  Prior measurement 1.8 cm.     Lesion 3: Portacaval lymph node.  1.0 cm. Series 3 image 59.  Prior measurement 1.2 cm.     Lesion 4: Mesenteric lymph node.  1.2 cm. Series 3 image 105.  Prior measurement 1.6 cm.     12/4/23 CT chest abd pelvis  Impression:     1. Stable and mildly enlarged mediastinal, retroperitoneal, mesenteric, and left pelvic lymph nodes, as well as interval development of new nodular soft tissue densities along the left pericolic gutter worrisome for tumoral deposits.  2. Interval slight increase in size several solid pulmonary nodules.  No new pulmonary nodule.  3. New small area of nodular wall thickening along the posterosuperior aspect of the urinary bladder, nonspecific and possibly related to adjacent fibrosis/scarring, with small tumoral deposit not excluded.  Consider correlation with cystoscopy.  4. Bilateral nonobstructive nephrolithiasis.  Mild left hydronephrosis.  5. Multiple additional findings, as noted above.  RECIST SUMMARY:     Date of prior examination for comparison: 10/09/2023     Lesion 1: Retroperitoneal soft tissue nodule abutting the duodenum.  1.3 cm. Series 102 image 155.  Prior measurement 1.3 cm.     Lesion 2: Celiac axis lymph  node.  1.5 cm. Series 102 image 82.  Prior measurement 1.5 cm.     Lesion 3: Portacaval lymph node.  1.2 cm. Series 102 image 92.  Prior measurement 1.2 cm.     Lesion 4: Mesenteric lymph node.  1.6 cm. Series 102 image 177.  Prior measurement 1.2 cm.    12/13/23  PET     Impression:     Progression of disease with multiple new and enlarging lymph nodes throughout the pelvis and mesentery along with new metastatic FDG avid mediastinal adenopathy as above.  Oncology History:  Oncology History   Malignant neoplasm of sigmoid colon   3/16/2020 Initial Diagnosis    Malignant neoplasm of sigmoid colon     3/31/2020 Cancer Staged    Staging form: Colon and Rectum, AJCC 8th Edition  - Clinical stage from 3/31/2020: Stage IIIC (cT4b, cN2a, cM0)     5/6/2020 - 11/17/2020 Chemotherapy    Treatment Summary   Plan Name: OP FOLFOX 6 Q2W  Treatment Goal: Curative  Status: Inactive  Start Date: 5/6/2020  End Date: 11/6/2020  Provider: Dylan Leyva MD  Chemotherapy: fluorouraciL injection 945 mg, 400 mg/m2 = 945 mg, Intravenous, Clinic/[facility] 1 time, 14 of 14 cycles  Administration: 945 mg (5/6/2020), 945 mg (5/20/2020), 945 mg (6/3/2020), 945 mg (6/17/2020), 945 mg (7/29/2020), 945 mg (8/12/2020), 945 mg (8/26/2020), 945 mg (9/9/2020), 945 mg (9/23/2020), 945 mg (10/6/2020), 945 mg (10/21/2020), 945 mg (11/4/2020)  fluorouraciL 2,400 mg/m2 = 5,665 mg in sodium chloride 0.9% 240 mL chemo infusion, 2,400 mg/m2 = 5,665 mg, Intravenous, Over 46 hours, 14 of 14 cycles  Administration: 5,665 mg (5/6/2020), 5,665 mg (5/20/2020), 5,665 mg (6/3/2020), 5,665 mg (6/17/2020), 5,665 mg (7/29/2020), 5,665 mg (8/12/2020), 5,665 mg (8/26/2020), 5,665 mg (9/9/2020), 5,665 mg (9/23/2020), 5,665 mg (10/6/2020), 5,665 mg (10/21/2020), 5,665 mg (11/4/2020)  leucovorin calcium 900 mg in dextrose 5 % 250 mL infusion, 945 mg, Intravenous, [facility] [place] 1 time, 14 of 14 cycles  Administration: 900 mg (5/6/2020), 900 mg (5/20/2020), 900 mg (6/3/2020), 900 mg  (6/17/2020), 945 mg (6/30/2020), 945 mg (7/20/2020), 945 mg (7/29/2020), 945 mg (8/12/2020), 945 mg (8/26/2020), 945 mg (9/9/2020), 945 mg (9/23/2020), 945 mg (10/6/2020), 945 mg (10/21/2020), 945 mg (11/4/2020)  oxaliplatin (ELOXATIN) 200 mg in dextrose 5 % 500 mL chemo infusion, 201 mg, Intravenous, Clinic/[facility] 1 time, 6 of 6 cycles  Dose modification: 65 mg/m2 (original dose 85 mg/m2, Cycle 6)  Administration: 200 mg (5/6/2020), 200 mg (5/20/2020), 200 mg (6/3/2020), 200 mg (6/17/2020), 201 mg (6/30/2020), 150 mg (7/20/2020)     5/3/2021 Cancer Staged    Staging form: Colon and Rectum, AJCC 8th Edition  - Clinical stage from 5/3/2021: Stage Unknown (rcT0, cNX, cM1)     6/16/2021 - 8/2/2023 Chemotherapy    Treatment Summary   Plan Name: OP COLORECTAL FOLFIRI + BEVACIZUMAB Q2W  Treatment Goal: Control  Status: Inactive  Start Date: 6/16/2021  End Date: 8/2/2023  Provider: Marah Santo MD  Chemotherapy: fluorouraciL injection 990 mg, 400 mg/m2 = 990 mg, Intravenous, Clinic/[facility] 1 time, 15 of 15 cycles  Administration: 990 mg (6/16/2021), 990 mg (6/30/2021), 990 mg (7/14/2021), 980 mg (7/28/2021), 990 mg (8/11/2021), 990 mg (8/25/2021), 990 mg (9/8/2021), 990 mg (9/22/2021), 990 mg (10/6/2021), 990 mg (10/20/2021), 990 mg (11/3/2021), 990 mg (12/1/2021), 990 mg (12/15/2021), 990 mg (11/17/2021), 990 mg (12/28/2021)  fluorouraciL 2,400 mg/m2 = 5,950 mg in sodium chloride 0.9% 240 mL chemo infusion, 2,400 mg/m2 = 5,950 mg, Intravenous, Over 46 hours, 43 of 46 cycles  Administration: 5,950 mg (6/16/2021), 5,950 mg (6/30/2021), 5,950 mg (7/14/2021), 5,880 mg (7/28/2021), 5,930 mg (8/11/2021), 5,930 mg (8/25/2021), 5,930 mg (9/8/2021), 5,930 mg (9/22/2021), 5,930 mg (10/6/2021), 5,930 mg (10/20/2021), 5,930 mg (11/3/2021), 5,930 mg (12/1/2021), 5,930 mg (12/15/2021), 5,930 mg (11/17/2021), 5,930 mg (12/28/2021), 6,050 mg (5/11/2022), 6,025 mg (3/9/2022), 6,025 mg (2/23/2022), 5,930 mg (2/2/2022), 5,930 mg (1/19/2022),  6,050 mg (4/20/2022), 6,025 mg (4/6/2022), 6,025 mg (3/23/2022), 6,050 mg (6/1/2022), 6,050 mg (6/15/2022), 6,050 mg (10/19/2022), 6,050 mg (10/5/2022), 6,050 mg (11/2/2022), 6,050 mg (11/16/2022), 6,050 mg (11/30/2022), 6,050 mg (1/4/2023), 6,050 mg (12/19/2022), 6,050 mg (1/18/2023), 6,000 mg (5/22/2023), 6,000 mg (4/26/2023), 6,000 mg (4/11/2023), 6,000 mg (2/28/2023), 6,050 mg (2/14/2023), 6,000 mg (2/1/2023), 6,000 mg (7/5/2023), 6,000 mg (6/19/2023), 6,000 mg (6/5/2023), 6,000 mg (7/31/2023)  bevacizumab (AVASTIN) 600 mg in sodium chloride 0.9% 100 mL chemo infusion, 660 mg, Intravenous, [facility] 1 time, 36 of 36 cycles  Dose modification: 5 mg/kg (original dose 5 mg/kg, Cycle 26, Reason: MD Discretion, Comment: 5 mg/kg original dose)  Administration: 600 mg (6/30/2021), 600 mg (7/14/2021), 600 mg (7/28/2021), 600 mg (6/16/2021), 600 mg (8/11/2021), 655 mg (8/25/2021), 600 mg (9/8/2021), 600 mg (9/22/2021), 600 mg (10/6/2021), 600 mg (10/20/2021), 600 mg (11/3/2021), 655 mg (12/1/2021), 600 mg (12/15/2021), 600 mg (11/17/2021), 600 mg (12/28/2021), 600 mg (5/11/2022), 600 mg (3/9/2022), 600 mg (2/23/2022), 600 mg (2/2/2022), 600 mg (1/19/2022), 600 mg (4/20/2022), 680 mg (4/6/2022), 600 mg (3/23/2022), 680 mg (10/5/2022), 680 mg (10/19/2022), 680 mg (11/2/2022), 680 mg (11/16/2022), 680 mg (11/30/2022), 680 mg (1/4/2023), 680 mg (12/19/2022), 680 mg (1/18/2023), 680 mg (3/28/2023), 680 mg (3/14/2023), 680 mg (2/28/2023), 680 mg (2/14/2023), 680 mg (2/1/2023)  irinotecan (CAMPTOSAR) 440 mg in sodium chloride 0.9% 500 mL chemo infusion, 446 mg, Intravenous, Clinic/[facility] 1 time, 45 of 48 cycles  Dose modification: 180 mg/m2 (original dose 180 mg/m2, Cycle 24)  Administration: 440 mg (6/16/2021), 440 mg (6/30/2021), 440 mg (7/14/2021), 440 mg (7/28/2021), 440 mg (8/11/2021), 444 mg (8/25/2021), 440 mg (9/8/2021), 440 mg (9/22/2021), 440 mg (10/6/2021), 440 mg (10/20/2021), 440 mg (11/3/2021), 440 mg (12/1/2021),  440 mg (12/15/2021), 440 mg (11/17/2021), 440 mg (12/28/2021), 440 mg (5/11/2022), 440 mg (3/9/2022), 440 mg (2/23/2022), 440 mg (2/2/2022), 440 mg (1/19/2022), 440 mg (4/20/2022), 440 mg (4/6/2022), 440 mg (3/24/2022), 440 mg (6/1/2022), 440 mg (6/15/2022), 440 mg (10/19/2022), 440 mg (10/5/2022), 440 mg (11/2/2022), 440 mg (11/16/2022), 440 mg (11/30/2022), 440 mg (1/4/2023), 440 mg (12/19/2022), 440 mg (1/18/2023), 440 mg (5/22/2023), 440 mg (4/26/2023), 440 mg (4/11/2023), 440 mg (3/28/2023), 440 mg (3/14/2023), 440 mg (2/28/2023), 440 mg (2/14/2023), 440 mg (2/1/2023), 440 mg (7/5/2023), 440 mg (6/19/2023), 440 mg (6/5/2023), 440 mg (7/31/2023)  bevacizumab-awwb (MVASI) 5 mg/kg = 680 mg in sodium chloride 0.9% 100 mL infusion, 5 mg/kg = 680 mg (100 % of original dose 5 mg/kg), Intravenous, Clinic/[facility] 1 time, 7 of 10 cycles  Dose modification: 5 mg/kg (original dose 5 mg/kg, Cycle 39)  Administration: 680 mg (4/11/2023), 680 mg (4/26/2023), 680 mg (5/22/2023), 680 mg (7/5/2023), 680 mg (6/19/2023), 680 mg (6/5/2023), 680 mg (7/31/2023)     8/17/2023 - 8/18/2023 Chemotherapy    Treatment Summary   Plan Name: OP CETUXIMAB 500MG Q2W  Treatment Goal: Control  Status: Inactive  Start Date:   End Date:   Provider: Marah Santo MD  Chemotherapy: [No matching medication found in this treatment plan]     8/24/2023 -  Chemotherapy    Treatment Summary   Plan Name: [facility] - ARM 1 ENCORAFENIB  CETUXIMAB NIVOLUMAB  Treatment Goal: Palliative  Status: Active  Start Date: 8/24/2023  End Date: 7/12/2024 (Planned)  Provider: Marah Santo MD  Chemotherapy: cetuximab (ERBITUX) 100 mg/50 mL chemo infusion 1,200 mg, 1,230 mg, Intravenous, Clinic/[facility] 1 time, 5 of 12 cycles  Administration: 1,200 mg (8/24/2023), 1,200 mg (9/8/2023), 1,200 mg (9/22/2023), 1,200 mg (10/6/2023), 1,200 mg (10/20/2023), 1,200 mg (11/3/2023), 1,200 mg (11/17/2023), 1,200 mg (12/1/2023), 1,200 mg (12/15/2023), 1,200 mg (12/29/2023)  encorafenib 75  mg Cap, 300 mg, Oral, Daily, 1 of 1 cycle, Start date: --, End date: --     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   4/3/2020 Initial Diagnosis    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     5/6/2020 - 11/17/2020 Chemotherapy    Treatment Summary   Plan Name: OP FOLFOX 6 Q2W  Treatment Goal: Curative  Status: Inactive  Start Date: 5/6/2020  End Date: 11/6/2020  Provider: Dylan Leyva MD  Chemotherapy: fluorouraciL injection 945 mg, 400 mg/m2 = 945 mg, Intravenous, [facility] 1 time, 14 of 14 cycles  Administration: 945 mg (5/6/2020), 945 mg (5/20/2020), 945 mg (6/3/2020), 945 mg (6/17/2020), 945 mg (7/29/2020), 945 mg (8/12/2020), 945 mg (8/26/2020), 945 mg (9/9/2020), 945 mg (9/23/2020), 945 mg (10/6/2020), 945 mg (10/21/2020), 945 mg (11/4/2020)  fluorouraciL 2,400 mg/m2 = 5,665 mg in sodium chloride 0.9% 240 mL chemo infusion, 2,400 mg/m2 = 5,665 mg, Intravenous, Over 46 hours, 14 of 14 cycles  Administration: 5,665 mg (5/6/2020), 5,665 mg (5/20/2020), 5,665 mg (6/3/2020), 5,665 mg (6/17/2020), 5,665 mg (7/29/2020), 5,665 mg (8/12/2020), 5,665 mg (8/26/2020), 5,665 mg (9/9/2020), 5,665 mg (9/23/2020), 5,665 mg (10/6/2020), 5,665 mg (10/21/2020), 5,665 mg (11/4/2020)  leucovorin calcium 900 mg in dextrose 5 % 250 mL infusion, 945 mg, Intravenous, Clinic/[facility] 1 time, 14 of 14 cycles  Administration: 900 mg (5/6/2020), 900 mg (5/20/2020), 900 mg (6/3/2020), 900 mg (6/17/2020), 945 mg (6/30/2020), 945 mg (7/20/2020), 945 mg (7/29/2020), 945 mg (8/12/2020), 945 mg (8/26/2020), 945 mg (9/9/2020), 945 mg (9/23/2020), 945 mg (10/6/2020), 945 mg (10/21/2020), 945 mg (11/4/2020)  oxaliplatin (ELOXATIN) 200 mg in dextrose 5 % 500 mL chemo infusion, 201 mg, Intravenous, Clinic/\[facility] [place] [facility]\"" 1 time, 6 of 6 cycles  Dose modification: 65 mg/m2 (original dose 85 mg/m2, Cycle 6)  Administration: 200 mg (5/6/2020), 200 mg (5/20/2020), 200 mg (6/3/2020), 200 mg (6/17/2020), 201 mg (6/30/2020), 150 mg (7/20/2020)     6/16/2021 - 8/2/2023  Chemotherapy    Treatment Summary   Plan Name: OP COLORECTAL FOLFIRI + BEVACIZUMAB Q2W  Treatment Goal: Control  Status: Inactive  Start Date: 6/16/2021  End Date: 8/2/2023  Provider: Marah Santo MD  Chemotherapy: fluorouraciL injection 990 mg, 400 mg/m2 = 990 mg, Intravenous, Clinic/[facility] 1 time, 15 of 15 cycles  Administration: 990 mg (6/16/2021), 990 mg (6/30/2021), 990 mg (7/14/2021), 980 mg (7/28/2021), 990 mg (8/11/2021), 990 mg (8/25/2021), 990 mg (9/8/2021), 990 mg (9/22/2021), 990 mg (10/6/2021), 990 mg (10/20/2021), 990 mg (11/3/2021), 990 mg (12/1/2021), 990 mg (12/15/2021), 990 mg (11/17/2021), 990 mg (12/28/2021)  fluorouraciL 2,400 mg/m2 = 5,950 mg in sodium chloride 0.9% 240 mL chemo infusion, 2,400 mg/m2 = 5,950 mg, Intravenous, Over 46 hours, 43 of 46 cycles  Administration: 5,950 mg (6/16/2021), 5,950 mg (6/30/2021), 5,950 mg (7/14/2021), 5,880 mg (7/28/2021), 5,930 mg (8/11/2021), 5,930 mg (8/25/2021), 5,930 mg (9/8/2021), 5,930 mg (9/22/2021), 5,930 mg (10/6/2021), 5,930 mg (10/20/2021), 5,930 mg (11/3/2021), 5,930 mg (12/1/2021), 5,930 mg (12/15/2021), 5,930 mg (11/17/2021), 5,930 mg (12/28/2021), 6,050 mg (5/11/2022), 6,025 mg (3/9/2022), 6,025 mg (2/23/2022), 5,930 mg (2/2/2022), 5,930 mg (1/19/2022), 6,050 mg (4/20/2022), 6,025 mg (4/6/2022), 6,025 mg (3/23/2022), 6,050 mg (6/1/2022), 6,050 mg (6/15/2022), 6,050 mg (10/19/2022), 6,050 mg (10/5/2022), 6,050 mg (11/2/2022), 6,050 mg (11/16/2022), 6,050 mg (11/30/2022), 6,050 mg (1/4/2023), 6,050 mg (12/19/2022), 6,050 mg (1/18/2023), 6,000 mg (5/22/2023), 6,000 mg (4/26/2023), 6,000 mg (4/11/2023), 6,000 mg (2/28/2023), 6,050 mg (2/14/2023), 6,000 mg (2/1/2023), 6,000 mg (7/5/2023), 6,000 mg (6/19/2023), 6,000 mg (6/5/2023), 6,000 mg (7/31/2023)  bevacizumab (AVASTIN) 600 mg in sodium chloride 0.9% 100 mL chemo infusion, 660 mg, Intravenous, Clinic/[facility] 1 time, 36 of 36 cycles  Dose modification: 5 mg/kg (original dose 5 mg/kg, Cycle 26,  Reason: MD Discretion, Comment: 5 mg/kg original dose)  Administration: 600 mg (6/30/2021), 600 mg (7/14/2021), 600 mg (7/28/2021), 600 mg (6/16/2021), 600 mg (8/11/2021), 655 mg (8/25/2021), 600 mg (9/8/2021), 600 mg (9/22/2021), 600 mg (10/6/2021), 600 mg (10/20/2021), 600 mg (11/3/2021), 655 mg (12/1/2021), 600 mg (12/15/2021), 600 mg (11/17/2021), 600 mg (12/28/2021), 600 mg (5/11/2022), 600 mg (3/9/2022), 600 mg (2/23/2022), 600 mg (2/2/2022), 600 mg (1/19/2022), 600 mg (4/20/2022), 680 mg (4/6/2022), 600 mg (3/23/2022), 680 mg (10/5/2022), 680 mg (10/19/2022), 680 mg (11/2/2022), 680 mg (11/16/2022), 680 mg (11/30/2022), 680 mg (1/4/2023), 680 mg (12/19/2022), 680 mg (1/18/2023), 680 mg (3/28/2023), 680 mg (3/14/2023), 680 mg (2/28/2023), 680 mg (2/14/2023), 680 mg (2/1/2023)  irinotecan (CAMPTOSAR) 440 mg in sodium chloride 0.9% 500 mL chemo infusion, 446 mg, Intravenous, Clinic/[facility] 1 time, 45 of 48 cycles  Dose modification: 180 mg/m2 (original dose 180 mg/m2, Cycle 24)  Administration: 440 mg (6/16/2021), 440 mg (6/30/2021), 440 mg (7/14/2021), 440 mg (7/28/2021), 440 mg (8/11/2021), 444 mg (8/25/2021), 440 mg (9/8/2021), 440 mg (9/22/2021), 440 mg (10/6/2021), 440 mg (10/20/2021), 440 mg (11/3/2021), 440 mg (12/1/2021), 440 mg (12/15/2021), 440 mg (11/17/2021), 440 mg (12/28/2021), 440 mg (5/11/2022), 440 mg (3/9/2022), 440 mg (2/23/2022), 440 mg (2/2/2022), 440 mg (1/19/2022), 440 mg (4/20/2022), 440 mg (4/6/2022), 440 mg (3/24/2022), 440 mg (6/1/2022), 440 mg (6/15/2022), 440 mg (10/19/2022), 440 mg (10/5/2022), 440 mg (11/2/2022), 440 mg (11/16/2022), 440 mg (11/30/2022), 440 mg (1/4/2023), 440 mg (12/19/2022), 440 mg (1/18/2023), 440 mg (5/22/2023), 440 mg (4/26/2023), 440 mg (4/11/2023), 440 mg (3/28/2023), 440 mg (3/14/2023), 440 mg (2/28/2023), 440 mg (2/14/2023), 440 mg (2/1/2023), 440 mg (7/5/2023), 440 mg (6/19/2023), 440 mg (6/5/2023), 440 mg (7/31/2023)  bevacizumab-awwb (MVASI) 5 mg/kg = 680  mg in sodium chloride 0.9% 100 mL infusion, 5 mg/kg = 680 mg (100 % of original dose 5 mg/kg), Intravenous, Clinic/[facility] 1 time, 7 of 10 cycles  Dose modification: 5 mg/kg (original dose 5 mg/kg, Cycle 39)  Administration: 680 mg (2023), 680 mg (2023), 680 mg (2023), 680 mg (2023), 680 mg (2023), 680 mg (2023), 680 mg (2023)     2023 - 2023 Chemotherapy    Treatment Summary   Plan Name: OP CETUXIMAB 500MG Q2W  Treatment Goal: Control  Status: Inactive  Start Date:   End Date:   Provider: Marah Santo MD  Chemotherapy: [No matching medication found in this treatment plan]     2023 -  Chemotherapy    Treatment Summary   Plan Name: [facility] - ARM 1 ENCORAFENIB  CETUXIMAB NIVOLUMAB  Treatment Goal: Palliative  Status: Active  Start Date: 2023  End Date: 2024 (Planned)  Provider: Marah Santo MD  Chemotherapy: cetuximab (ERBITUX) 100 mg/50 mL chemo infusion 1,200 mg, 1,230 mg, Intravenous, Clinic/HOD 1 time, 5 of 12 cycles  Administration: 1,200 mg (2023), 1,200 mg (2023), 1,200 mg (2023), 1,200 mg (10/6/2023), 1,200 mg (10/20/2023), 1,200 mg (11/3/2023), 1,200 mg (2023), 1,200 mg (2023), 1,200 mg (12/15/2023), 1,200 mg (2023)  encorafenib 75 mg Cap, 300 mg, Oral, Daily, 1 of 1 cycle, Start date: --, End date: --         History:  Past Medical History:   Diagnosis Date    Anxiety     Depression     FH: ovarian cancer 2020    Hx of psychiatric care     Effexor, Paxil, Lexapro, Zoloft, Wellbutrin, Trazodone Buspar    Hyperthyroidism     Hypothyroid     Kidney calculi     Malignant neoplasm of sigmoid colon 2020    Menorrhagia     Multinodular goiter 2012    Palpitation     Psychiatric problem     Venous insufficiency       Past Surgical History:   Procedure Laterality Date     SECTION, CLASSIC      x3    COLONOSCOPY N/A 2020    Procedure: COLONOSCOPY;  Surgeon: Shane Parker MD;  Location:  [facility] ENDO;  Service: Endoscopy;  Laterality: N/A;    COLONOSCOPY N/A 6/21/2022    Procedure: COLONOSCOPY;  Surgeon: Shane Parker MD;  Location: [facility] ENDO;  Service: Endoscopy;  Laterality: N/A;    COLONOSCOPY N/A 5/9/2023    Procedure: COLONOSCOPY;  Surgeon: Shane Parker MD;  Location: [facility] ENDO;  Service: Endoscopy;  Laterality: N/A;  no cecum anastomosis    CYSTOSCOPY W/ URETERAL STENT PLACEMENT Bilateral 3/25/2020    Procedure: CYSTOSCOPY, WITH URETERAL STENT INSERTION;  Surgeon: Claudio Tyson MD;  Location: [facility] OR [address] [place] [address];  Service: Urology;  Laterality: Bilateral;    INSERTION OF TUNNELED CENTRAL VENOUS CATHETER (CVC) WITH SUBCUTANEOUS PORT N/A 5/1/2020    Procedure: [id number]-[id number]-H-CATH;  Surgeon: Stephen Diagnostic Provider;  Location: [facility] OR [address] [place] [address];  Service: Radiology;  Laterality: N/A;  Room 189/Cindy    LAPAROSCOPIC SIGMOIDECTOMY N/A 3/25/2020    Procedure: COLECTOMY, SIGMOID, LAPAROSCOPIC, flex sig, ERAS high;  Surgeon: Silvio Man MD;  Location: [facility] OR [address] [place] [address];  Service: Colon and Rectal;  Laterality: N/A;    MOBILIZATION OF SPLENIC FLEXURE  3/25/2020    Procedure: MOBILIZATION, SPLENIC FLEXURE;  Surgeon: Silvio Man MD;  Location: [facility] OR [address] [place] [address];  Service: Colon and Rectal;;    tonsillectomy      TOTAL ABDOMINAL HYSTERECTOMY W/ BILATERAL SALPINGOOPHORECTOMY N/A 3/25/2020    Procedure: HYSTERECTOMY, TOTAL, ABDOMINAL, WITH BILATERAL SALPINGO-OOPHORECTOMY;  Surgeon: Keron Brady MD;  Location: [facility] OR [address] [place] [address];  Service: Oncology;  Laterality: N/A;     Family History   Problem Relation Age of Onset    Heart disease Father     Diabetes Mother 65    Drug abuse Brother     Drug abuse Brother     Cancer Maternal Aunt         lung cancer    Cancer Maternal Grandmother         stomach cancer- started in ovaries    Ovarian cancer Maternal Grandmother         stomach cancer- started in ovaries    Colon cancer Paternal Uncle     Cancer Paternal Uncle     Ovarian cancer  "Maternal Aunt     Ovarian cancer Maternal Aunt     Ovarian cancer Cousin         mother had ovarian cancer    Drug abuse Son     Drug abuse Son         clean/sober since 2012    Colon cancer Son     No Known Problems Daughter     Uterine cancer Neg Hx     Breast cancer Neg Hx      Social History     Tobacco Use    Smoking status: Never    Smokeless tobacco: Never   Substance and Sexual Activity    Alcohol use: Yes     Comment: occasionally- twice monthly    Drug use: Never    Sexual activity: Yes     Partners: Male     Birth control/protection: See Surgical Hx        ROS:   Review of Systems   Constitutional:  Positive for malaise/fatigue. Negative for fever and weight loss.   HENT:  Negative for congestion, hearing loss, nosebleeds and sore throat.    Eyes:  Negative for double vision and photophobia.   Respiratory:  Negative for cough, hemoptysis, sputum production, shortness of breath and wheezing.    Cardiovascular:  Negative for chest pain, palpitations, orthopnea and leg swelling.   Gastrointestinal:  Negative for abdominal pain, blood in stool, constipation, diarrhea, heartburn and vomiting.   Genitourinary:  Positive for frequency and urgency. Negative for dysuria and hematuria.   Musculoskeletal:  Negative for back pain, joint pain and myalgias.   Skin:  Positive for rash. Negative for itching.   Neurological:  Negative for dizziness, tingling, seizures, weakness and headaches.   Endo/Heme/Allergies:  Negative for polydipsia. Does not bruise/bleed easily.   Psychiatric/Behavioral:  Negative for depression and memory loss. The patient is nervous/anxious and has insomnia (improving).         Objective:     Vitals:    01/11/24 1332   BP: 123/76   Pulse: 98   Resp: 18   Temp: 97.5 °F (36.4 °C)   TempSrc: Temporal   SpO2: 97%   Weight: 125.3 kg (276 lb 3.8 oz)   Height: 5' 6" (1.676 m)   PainSc: 0-No pain         Wt Readings from Last 10 Encounters:   01/11/24 125.3 kg (276 lb 3.8 oz)   01/09/24 127.7 kg (281 lb " 8.4 oz)   01/02/24 128.7 kg (283 lb 11.7 oz)   12/29/23 129.5 kg (285 lb 7.9 oz)   12/29/23 129.5 kg (285 lb 7.9 oz)   12/15/23 128.7 kg (283 lb 11.7 oz)   12/14/23 128.7 kg (283 lb 11.7 oz)   12/01/23 129.9 kg (286 lb 6 oz)   11/30/23 129.9 kg (286 lb 6 oz)   11/22/23 129.6 kg (285 lb 11.5 oz)       Physical Examination:   Physical Exam  Vitals and nursing note reviewed.   Constitutional:       General: She is not in acute distress.     Appearance: She is not diaphoretic.   HENT:      Head: Normocephalic.      Mouth/Throat:      Pharynx: No oropharyngeal exudate.   Eyes:      General: No scleral icterus.     Conjunctiva/sclera: Conjunctivae normal.   Neck:      Thyroid: No thyromegaly.   Cardiovascular:      Rate and Rhythm: Normal rate and regular rhythm.      Heart sounds: Normal heart sounds. No murmur heard.  Pulmonary:      Effort: Pulmonary effort is normal. No respiratory distress.      Breath sounds: No stridor. No wheezing or rales.   Chest:      Chest wall: No tenderness.   Abdominal:      General: Bowel sounds are normal. There is no distension.      Palpations: Abdomen is soft. There is no mass.      Tenderness: There is no abdominal tenderness. There is no rebound.   Musculoskeletal:         General: No tenderness or deformity. Normal range of motion.      Cervical back: Neck supple.   Lymphadenopathy:      Cervical: No cervical adenopathy.   Skin:     General: Skin is warm and dry.      Findings: Rash (grade 1 facial rash, at baseline) present. No erythema.   Neurological:      Mental Status: She is alert and oriented to person, place, and time.      Cranial Nerves: No cranial nerve deficit.      Coordination: Coordination normal.      Gait: Gait is intact.   Psychiatric:         Mood and Affect: Affect normal.         Cognition and Memory: Memory normal.         Judgment: Judgment normal.          Diagnostic Tests:  Significant Imaging: I have reviewed and interpreted all pertinent imaging  results/findings.  Laboratory Data:  All pertinent labs have been reviewed.    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 Oxaliplatin was stopped due to severe adverse reaction.     Follow-up CTs and PET in May 2021 showed enlarging retroperitoneal node, concerning for metastatic disease.      She started FOLFIRI + Avastin and has continued till July 2022.   Given isolated RP toni disease, she has undergone open Left periaortic and aortocaval lymph node dissection on 7/12/2022 at [facility]. Resumed FOLFIRI+ Debo with relatively stable disease but now has disease progression on scans in August 2023.     She has been enrolled into  SWOG 2107 (encorafenib/cetuximab + nivo) , cleared for cycle 5 D1. On additional pre medications given infusion reaction to Cetuximab, continue to monitor closely.     Clinically stable, CT scans and PET scans are reviewed and protocol reviewed. As per Recist calculations, the target lesions are stable. However, there is asymptomatic possible POD in the left paracolic gutter, seen on PET scan as well. Based on treatment protocol, these changes noted within 12 weeks of immune therapy could be pseudo progression. Will repeat scans at 4 weeks prior to cycle 6 and monitor closely.     Grade 1 dermatitis   Noted, continue  topical hydrocortisone and clindamycin gel, remains on oral doxycycline.     Cystitis non infective   Grade 1.   Check U/A, treat if needed.     Neoplasm pain  Improving. Follow up with palliative care.     Hydronephrosis   Follow up with Urology for co management. No acute intervention was recommended.     Nausea   Continue compazine as needed, improving.     Constipation  Start sennakot and daily Miralax.     Transaminitis  Fluctuates.  Continue to monitor. No obvious liver mets.     Hypercalcemia   Mild, secondary to hyperparathyroidism.  Avoid calcium supplements. Continue Endocrine follow up.     Hypothyroidism  Multinodular goiter   Continue Levothyroxine. Recent dose adjustment noted.   Had a non diagnostic biopsy in 2012, usg findings have remained stable. Reviewed repeat thyroid US, stable, will follow up with Endocrine.     Essential HTN   Continue antihypertensives.      Anxiety   Continue to  follow up with Onc psych.    Vaginal candidosis   Responded to fluconazole, monitor.    MDM includes  :    - Acute or chronic illness or injury that poses a threat to life or bodily function  - Independent review and explanation of 3+ results from unique tests  - Discussion of management and ordering 3+ unique tests  - Extensive discussion of treatment and management  - Prescription drug management  - Drug therapy requiring intensive monitoring for toxicity          ECOG SCORE               Discussion:   No follow-ups on file.    Plan was discussed with the patient at length, and she verbalized understanding. Gail was given an opportunity to ask questions that were answered to her satisfaction, and she was advised to call in the interval if any problems or questions arise.    Electronically signed by Marah Santo MD        Route Chart for Scheduling  Med Onc Route Chart for Scheduling    Treatment Plan Information   [facility] - ARM 1 ENCORAFENIB  CETUXIMAB NIVOLUMAB   Marah Santo MD   Upcoming Treatment  Dates - [facility] - ARM 1 ENCORAFENIB  CETUXIMAB NIVOLUMAB    1/12/2024       Pre-Medications       ondansetron injection 8 mg       hydrocortisone sodium succinate injection 50 mg       diphenhydrAMINE (BENADRYL) 50 mg in NS 50 mL IVPB       acetaminophen tablet 650 mg       Chemotherapy       cetuximab (ERBITUX) 100 mg/50 mL chemo infusion 1,230 mg       INV nivolumab 480 mg in INV sodium chloride 0.9 % 100 mL chemo infusion  1/26/2024       Pre-Medications       ondansetron injection 8 mg       hydrocortisone sodium succinate injection 50 mg       diphenhydrAMINE (BENADRYL) 50 mg in NS 50 mL IVPB       acetaminophen tablet 650 mg       Chemotherapy       cetuximab (ERBITUX) 100 mg/50 mL chemo infusion 1,230 mg  2/9/2024       Pre-Medications       ondansetron injection 8 mg       hydrocortisone sodium succinate injection 50 mg       diphenhydrAMINE (BENADRYL) 50 mg in NS 50 mL IVPB       acetaminophen tablet 650 mg       Chemotherapy       cetuximab (ERBITUX) 100 mg/50 mL chemo infusion 1,230 mg       INV nivolumab 480 mg in INV sodium chloride 0.9 % 100 mL chemo infusion  2/23/2024       Pre-Medications       ondansetron injection 8 mg       hydrocortisone sodium succinate injection 50 mg       diphenhydrAMINE (BENADRYL) 50 mg in NS 50 mL IVPB       acetaminophen tablet 650 mg       Chemotherapy       cetuximab (ERBITUX) 100 mg/50 mL chemo infusion 1,230 mg    Supportive Plan Information  IV FLUIDS AND ELECTROLYTES   Brayden Solo MD   Upcoming Treatment Dates - IV FLUIDS AND ELECTROLYTES    No upcoming days in selected categories.    Therapy Plan Information  PORT FLUSH  Flushes  heparin, porcine (PF) 100 unit/mL injection flush 500 Units  500 Units, Intravenous, Every visit  sodium chloride 0.9% flush 10 mL  10 mL, Intravenous, Every visit

## 2024-01-11 NOTE — PROGRESS NOTES
PROGRESS NOTE    Subjective:       Patient ID: Gail Mckinnon is a 53 y.o. female.  MRN: [id number]  : 1970    Chief Complaint: Metastatic colon cancer     History of Present Illness:   Gail Mckinnon is a 53 y.o. female who presents with colon cancer, initially stage III and now with LN recurrence.    On FOLIRI+ Avastin sine .     She was briefly switched to xeloda due to 5 FU shortage for a month. Did not tolerate Xeloda well due hand foot syndrome, blistering of feet, did not take the last day of Xeloda. Completed .     Resumed Folfiri + Avastin on 23. Atropine was held due to prior constipation.     Was admitted to the hospital from -23 for intractable nausea , vomiting and diarrhea as well as abdominal pain. No hematochezia or dark stool was noted.      US showed gallbladder stone and dilated CBD. She was managed conservatively. Had CT abd, pelvis, colonoscopy and MRCP that were relatively unremarkable without signs of cholecystitis. Cholecystectomy was not recommended.     She had recently resumed FOLFIRI and the symptoms started after the first cycle of resuming, never had issues prior to this.  Stayed in the hospital for 10 days.  C diff was negative. No other etiology was established. Symptoms improved over time and she was discharged on .     Interim history:  Enrolled in  SWOG 2107 (encorafenib/cetuximab and randomized to the Nivolumab arm),      Staging scans are done as per research protocol first completed on 10/9, consistent with response to treatment. Most recent repeat scans . Porgression vs pseudo progression.   We discussed her follow up scans, images reviewed at Tumor board, PET scan was recommended and completed. Four week interval scanning was recommended.      23  Has remained on encorafenib and Cetuximab, randomized to Nivolumab.    Was seen by Dr. Pimentel on 24 for a dental infection  and vulvar lesion.  Dental pain and facial swelling is improving of Ceftin. Will complete tomorrow.      Referred to Gyn onc and I have discussed with  that  there is concern for metastatic deposit, lesion will be removed. Feels lesion  is growing fast, has intermittent shooting pain and bleeding. Plan to have excisional biopsy done on Tuesday next week.      Has had a localized lower abdominal pain for the past few weeks.     Underwent repeat CT scans on 1/4. We have reviewed at [facility] tumor board and I have discussed the plan with the colorectal team as well.   She seems to have progressive disease with continued systemic progression     1/4/24: CT chest abd pelvis  Impression:     There is an increased but small non water density pericardial effusion.     Mediastinal and hilar lymph nodes are largely stable as are multiple pulmonary nodules..     While the recist index lesions have not significantly changed in size there is increasing mesenteric and extraperitoneal adenopathy when compared to December 4, 2023.  There     There is also increasing 4th duodenum and proximal jejunal wall thickening and adjacent inflammation.    Oncology History:  She completed adjuvant FOLFOX in November 2020. She tolerated only 4 cycles of FOLFOX before she developed an infusion reaction to oxaliplatin in cycle 5 was well as cycle 6. She then completed 6 cycles of infusional 5 FU.     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 She was offered second line therapy with FOLFIRI and Avastin.      She presented to the ED on 11/26 with left flank pain. CT renal stone study showed Moderate hydronephrosis on the left secondary to 3 mm calculus at the UPJ.    She had a PET scan mid April that showed possible progression of her disease with      She has been to [facility] for another opinion. No change was recommended in systemic therapy but she was offered surgical removal of the aorta caval nodes.  Recent sans  at [facility]  show stable disease.      Surgery done 7/12/22. Received 2 more cycle of FOLFIRI without Avastin.     She had repeat imaging at [facility] on 9/24/22 that unfortunately showed disease progression since her surgery with multiple RP nodes and one mediastinal node.       PET 12/8/22  Impression:     1. Progression of disease with interval increase of abdominal and pelvic lymphadenopathy.  2. New area of moderate FDG uptake at the right half of the T9 vertebral body (image 124).  Favor degenerative disc changes.  No underlying osteolytic or osteoblastic lesion.  Recommend continued attention on follow-up.  3. No other evidence of FDG avid disease.     12/22/22  Impression After scan comparison:     1. Metastatic portacaval and left periaortic retroperitoneal lymph nodes which remain stable between the CT of 09/24/2022 and the subsequent PET-CT of 12/08/2022.  There is no evidence of progression of metastatic disease.  2. Nonobstructing bilateral renal calculi and cholelithiasis.    2/10/22:  CT chest abd pelvis  Impression:     Stable periportal, retroperitoneal and mesenteric lymphadenopathy compared to PET-CT 12/08/2022.     Stable right middle lobe 3 mm pulmonary nodule.  No new or enlarging pulmonary nodule.     Hepatic steatosis.  Hepatosplenomegaly.     Cholelithiasis.     Bilateral nonobstructing nephrolithiasis.     Small hiatal hernia with retained contrast in the distal esophagus.  Correlate for reflux.    She had virtual visit at [facility] on 2/17/23.     She has continued FOLFIRI and Avastin.     CT abd pelvis   5/7/23  Impression:     1. Mesenteric inflammatory changes have worsened since the prior study.  2. Mesenteric, retroperitoneal and left-sided pelvic enlarged lymph nodes are unchanged.     8/11/23  CT chest abd pelvis   Impression:     1. Patient with a history of colon adenocarcinoma with worsening periaortic, retroperitoneal, mesenteric, and iliac chain lymphadenopathy the in the abdomen and pelvis  when compared with the previous study suggesting worsening metastatic disease.  2. Moderate left hydronephrosis and proximal left hydroureter to the level of possible retroperitoneal scarring.  Invasion/compression of the left ureter is not excluded.  3. Bilateral nephrolithiasis  4. Hepatomegaly and hepatic steatosis  5. Two tiny < 5 mm pulmonary nodules within the chest, unchanged.  No new pulmonary nodules.    10/9/23  CT chest abd pelvis     Impression:     Decreased size of retroperitoneal and mesenteric lymph nodes.  Stable left external iliac lymph node. Right upper lobe pulmonary nodule appears slightly increased in size.  No new pulmonary nodules.  Findings suggest mixed/partial response to therapy.     Cholelithiasis.     Hepatosplenomegaly.     Small pericardial effusion.     RECIST SUMMARY:     Date of prior examination for comparison: 08/11/2023     Lesion 1: Retroperitoneal soft tissue nodule but in the duodenum.  1.7 cm. Series 3 image 93.  Prior measurement 82.1 cm.     Lesion 2: Celiac axis lymph node.  1.5 cm. Series 3 image 56.  Prior measurement 1.8 cm.     Lesion 3: Portacaval lymph node.  1.0 cm. Series 3 image 59.  Prior measurement 1.2 cm.     Lesion 4: Mesenteric lymph node.  1.2 cm. Series 3 image 105.  Prior measurement 1.6 cm.     12/4/23 CT chest abd pelvis  Impression:     1. Stable and mildly enlarged mediastinal, retroperitoneal, mesenteric, and left pelvic lymph nodes, as well as interval development of new nodular soft tissue densities along the left pericolic gutter worrisome for tumoral deposits.  2. Interval slight increase in size several solid pulmonary nodules.  No new pulmonary nodule.  3. New small area of nodular wall thickening along the posterosuperior aspect of the urinary bladder, nonspecific and possibly related to adjacent fibrosis/scarring, with small tumoral deposit not excluded.  Consider correlation with cystoscopy.  4. Bilateral nonobstructive nephrolithiasis.   Mild left hydronephrosis.  5. Multiple additional findings, as noted above.  RECIST SUMMARY:     Date of prior examination for comparison: 10/09/2023     Lesion 1: Retroperitoneal soft tissue nodule abutting the duodenum.  1.3 cm. Series 102 image 155.  Prior measurement 1.3 cm.     Lesion 2: Celiac axis lymph node.  1.5 cm. Series 102 image 82.  Prior measurement 1.5 cm.     Lesion 3: Portacaval lymph node.  1.2 cm. Series 102 image 92.  Prior measurement 1.2 cm.     Lesion 4: Mesenteric lymph node.  1.6 cm. Series 102 image 177.  Prior measurement 1.2 cm.    12/13/23  PET     Impression:     Progression of disease with multiple new and enlarging lymph nodes throughout the pelvis and mesentery along with new metastatic FDG avid mediastinal adenopathy as above.  Oncology History:  Oncology History   Malignant neoplasm of sigmoid colon   3/16/2020 Initial Diagnosis    Malignant neoplasm of sigmoid colon     3/31/2020 Cancer Staged    Staging form: Colon and Rectum, AJCC 8th Edition  - Clinical stage from 3/31/2020: Stage IIIC (cT4b, cN2a, cM0)     5/6/2020 - 11/17/2020 Chemotherapy    Treatment Summary   Plan Name: OP FOLFOX 6 Q2W  Treatment Goal: Curative  Status: Inactive  Start Date: 5/6/2020  End Date: 11/6/2020  Provider: Dylan Leyva MD  Chemotherapy: fluorouraciL injection 945 mg, 400 mg/m2 = 945 mg, Intravenous, Clinic/[facility] 1 time, 14 of 14 cycles  Administration: 945 mg (5/6/2020), 945 mg (5/20/2020), 945 mg (6/3/2020), 945 mg (6/17/2020), 945 mg (7/29/2020), 945 mg (8/12/2020), 945 mg (8/26/2020), 945 mg (9/9/2020), 945 mg (9/23/2020), 945 mg (10/6/2020), 945 mg (10/21/2020), 945 mg (11/4/2020)  fluorouraciL 2,400 mg/m2 = 5,665 mg in sodium chloride 0.9% 240 mL chemo infusion, 2,400 mg/m2 = 5,665 mg, Intravenous, Over 46 hours, 14 of 14 cycles  Administration: 5,665 mg (5/6/2020), 5,665 mg (5/20/2020), 5,665 mg (6/3/2020), 5,665 mg (6/17/2020), 5,665 mg (7/29/2020), 5,665 mg (8/12/2020), 5,665 mg  (8/26/2020), 5,665 mg (9/9/2020), 5,665 mg (9/23/2020), 5,665 mg (10/6/2020), 5,665 mg (10/21/2020), 5,665 mg (11/4/2020)  leucovorin calcium 900 mg in dextrose 5 % 250 mL infusion, 945 mg, Intravenous, Clinic/[facility] 1 time, 14 of 14 cycles  Administration: 900 mg (5/6/2020), 900 mg (5/20/2020), 900 mg (6/3/2020), 900 mg (6/17/2020), 945 mg (6/30/2020), 945 mg (7/20/2020), 945 mg (7/29/2020), 945 mg (8/12/2020), 945 mg (8/26/2020), 945 mg (9/9/2020), 945 mg (9/23/2020), 945 mg (10/6/2020), 945 mg (10/21/2020), 945 mg (11/4/2020)  oxaliplatin (ELOXATIN) 200 mg in dextrose 5 % 500 mL chemo infusion, 201 mg, Intravenous, Clinic/[facility] 1 time, 6 of 6 cycles  Dose modification: 65 mg/m2 (original dose 85 mg/m2, Cycle 6)  Administration: 200 mg (5/6/2020), 200 mg (5/20/2020), 200 mg (6/3/2020), 200 mg (6/17/2020), 201 mg (6/30/2020), 150 mg (7/20/2020)     5/3/2021 Cancer Staged    Staging form: Colon and Rectum, AJCC 8th Edition  - Clinical stage from 5/3/2021: Stage Unknown (rcT0, cNX, cM1)     6/16/2021 - 8/2/2023 Chemotherapy    Treatment Summary   Plan Name: OP COLORECTAL FOLFIRI + BEVACIZUMAB Q2W  Treatment Goal: Control  Status: Inactive  Start Date: 6/16/2021  End Date: 8/2/2023  Provider: Marah Santo MD  Chemotherapy: fluorouraciL injection 990 mg, 400 mg/m2 = 990 mg, Intravenous, Clinic/[facility] 1 time, 15 of 15 cycles  Administration: 990 mg (6/16/2021), 990 mg (6/30/2021), 990 mg (7/14/2021), 980 mg (7/28/2021), 990 mg (8/11/2021), 990 mg (8/25/2021), 990 mg (9/8/2021), 990 mg (9/22/2021), 990 mg (10/6/2021), 990 mg (10/20/2021), 990 mg (11/3/2021), 990 mg (12/1/2021), 990 mg (12/15/2021), 990 mg (11/17/2021), 990 mg (12/28/2021)  fluorouraciL 2,400 mg/m2 = 5,950 mg in sodium chloride 0.9% 240 mL chemo infusion, 2,400 mg/m2 = 5,950 mg, Intravenous, Over 46 hours, 43 of 46 cycles  Administration: 5,950 mg (6/16/2021), 5,950 mg (6/30/2021), 5,950 mg (7/14/2021), 5,880 mg (7/28/2021), 5,930 mg (8/11/2021), 5,930 mg  (8/25/2021), 5,930 mg (9/8/2021), 5,930 mg (9/22/2021), 5,930 mg (10/6/2021), 5,930 mg (10/20/2021), 5,930 mg (11/3/2021), 5,930 mg (12/1/2021), 5,930 mg (12/15/2021), 5,930 mg (11/17/2021), 5,930 mg (12/28/2021), 6,050 mg (5/11/2022), 6,025 mg (3/9/2022), 6,025 mg (2/23/2022), 5,930 mg (2/2/2022), 5,930 mg (1/19/2022), 6,050 mg (4/20/2022), 6,025 mg (4/6/2022), 6,025 mg (3/23/2022), 6,050 mg (6/1/2022), 6,050 mg (6/15/2022), 6,050 mg (10/19/2022), 6,050 mg (10/5/2022), 6,050 mg (11/2/2022), 6,050 mg (11/16/2022), 6,050 mg (11/30/2022), 6,050 mg (1/4/2023), 6,050 mg (12/19/2022), 6,050 mg (1/18/2023), 6,000 mg (5/22/2023), 6,000 mg (4/26/2023), 6,000 mg (4/11/2023), 6,000 mg (2/28/2023), 6,050 mg (2/14/2023), 6,000 mg (2/1/2023), 6,000 mg (7/5/2023), 6,000 mg (6/19/2023), 6,000 mg (6/5/2023), 6,000 mg (7/31/2023)  bevacizumab (AVASTIN) 600 mg in sodium chloride 0.9% 100 mL chemo infusion, 660 mg, Intravenous, Clinic/[facility] 1 time, 36 of 36 cycles  Dose modification: 5 mg/kg (original dose 5 mg/kg, Cycle 26, Reason: MD Discretion, Comment: 5 mg/kg original dose)  Administration: 600 mg (6/30/2021), 600 mg (7/14/2021), 600 mg (7/28/2021), 600 mg (6/16/2021), 600 mg (8/11/2021), 655 mg (8/25/2021), 600 mg (9/8/2021), 600 mg (9/22/2021), 600 mg (10/6/2021), 600 mg (10/20/2021), 600 mg (11/3/2021), 655 mg (12/1/2021), 600 mg (12/15/2021), 600 mg (11/17/2021), 600 mg (12/28/2021), 600 mg (5/11/2022), 600 mg (3/9/2022), 600 mg (2/23/2022), 600 mg (2/2/2022), 600 mg (1/19/2022), 600 mg (4/20/2022), 680 mg (4/6/2022), 600 mg (3/23/2022), 680 mg (10/5/2022), 680 mg (10/19/2022), 680 mg (11/2/2022), 680 mg (11/16/2022), 680 mg (11/30/2022), 680 mg (1/4/2023), 680 mg (12/19/2022), 680 mg (1/18/2023), 680 mg (3/28/2023), 680 mg (3/14/2023), 680 mg (2/28/2023), 680 mg (2/14/2023), 680 mg (2/1/2023)  irinotecan (CAMPTOSAR) 440 mg in sodium chloride 0.9% 500 mL chemo infusion, 446 mg, Intravenous, Clinic/[facility] 1 time, 45 of 48  cycles  Dose modification: 180 mg/m2 (original dose 180 mg/m2, Cycle 24)  Administration: 440 mg (6/16/2021), 440 mg (6/30/2021), 440 mg (7/14/2021), 440 mg (7/28/2021), 440 mg (8/11/2021), 444 mg (8/25/2021), 440 mg (9/8/2021), 440 mg (9/22/2021), 440 mg (10/6/2021), 440 mg (10/20/2021), 440 mg (11/3/2021), 440 mg (12/1/2021), 440 mg (12/15/2021), 440 mg (11/17/2021), 440 mg (12/28/2021), 440 mg (5/11/2022), 440 mg (3/9/2022), 440 mg (2/23/2022), 440 mg (2/2/2022), 440 mg (1/19/2022), 440 mg (4/20/2022), 440 mg (4/6/2022), 440 mg (3/24/2022), 440 mg (6/1/2022), 440 mg (6/15/2022), 440 mg (10/19/2022), 440 mg (10/5/2022), 440 mg (11/2/2022), 440 mg (11/16/2022), 440 mg (11/30/2022), 440 mg (1/4/2023), 440 mg (12/19/2022), 440 mg (1/18/2023), 440 mg (5/22/2023), 440 mg (4/26/2023), 440 mg (4/11/2023), 440 mg (3/28/2023), 440 mg (3/14/2023), 440 mg (2/28/2023), 440 mg (2/14/2023), 440 mg (2/1/2023), 440 mg (7/5/2023), 440 mg (6/19/2023), 440 mg (6/5/2023), 440 mg (7/31/2023)  bevacizumab-awwb (MVASI) 5 mg/kg = 680 mg in sodium chloride 0.9% 100 mL infusion, 5 mg/kg = 680 mg (100 % of original dose 5 mg/kg), Intravenous, Clinic/[facility] 1 time, 7 of 10 cycles  Dose modification: 5 mg/kg (original dose 5 mg/kg, Cycle 39)  Administration: 680 mg (4/11/2023), 680 mg (4/26/2023), 680 mg (5/22/2023), 680 mg (7/5/2023), 680 mg (6/19/2023), 680 mg (6/5/2023), 680 mg (7/31/2023)     8/17/2023 - 8/18/2023 Chemotherapy    Treatment Summary   Plan Name: OP CETUXIMAB 500MG Q2W  Treatment Goal: Control  Status: Inactive  Start Date:   End Date:   Provider: Marah Santo MD  Chemotherapy: [No matching medication found in this treatment plan]     8/24/2023 - 12/29/2023 Chemotherapy    Treatment Summary   Plan Name: [facility] - ARM 1 ENCORAFENIB  CETUXIMAB NIVOLUMAB  Treatment Goal: Palliative  Status: Inactive  Start Date: 8/24/2023  End Date: 12/29/2023  Provider: Marah Santo MD  Chemotherapy: cetuximab (ERBITUX) 100 mg/50 mL  chemo infusion 1,200 mg, 1,230 mg, Intravenous, Clinic/[facility] 1 time, 5 of 12 cycles  Administration: 1,200 mg (8/24/2023), 1,200 mg (9/8/2023), 1,200 mg (9/22/2023), 1,200 mg (10/6/2023), 1,200 mg (10/20/2023), 1,200 mg (11/3/2023), 1,200 mg (11/17/2023), 1,200 mg (12/1/2023), 1,200 mg (12/15/2023), 1,200 mg (12/29/2023)  encorafenib 75 mg Cap, 300 mg, Oral, Daily, 1 of 1 cycle, Start date: --, End date: --     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   4/3/2020 Initial Diagnosis    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     5/6/2020 - 11/17/2020 Chemotherapy    Treatment Summary   Plan Name: OP FOLFOX 6 Q2W  Treatment Goal: Curative  Status: Inactive  Start Date: 5/6/2020  End Date: 11/6/2020  Provider: Dylan Leyva MD  Chemotherapy: fluorouraciL injection 945 mg, 400 mg/m2 = 945 mg, Intravenous, Clinic/[facility] 1 time, 14 of 14 cycles  Administration: 945 mg (5/6/2020), 945 mg (5/20/2020), 945 mg (6/3/2020), 945 mg (6/17/2020), 945 mg (7/29/2020), 945 mg (8/12/2020), 945 mg (8/26/2020), 945 mg (9/9/2020), 945 mg (9/23/2020), 945 mg (10/6/2020), 945 mg (10/21/2020), 945 mg (11/4/2020)  fluorouraciL 2,400 mg/m2 = 5,665 mg in sodium chloride 0.9% 240 mL chemo infusion, 2,400 mg/m2 = 5,665 mg, Intravenous, Over 46 hours, 14 of 14 cycles  Administration: 5,665 mg (5/6/2020), 5,665 mg (5/20/2020), 5,665 mg (6/3/2020), 5,665 mg (6/17/2020), 5,665 mg (7/29/2020), 5,665 mg (8/12/2020), 5,665 mg (8/26/2020), 5,665 mg (9/9/2020), 5,665 mg (9/23/2020), 5,665 mg (10/6/2020), 5,665 mg (10/21/2020), 5,665 mg (11/4/2020)  leucovorin calcium 900 mg in dextrose 5 % 250 mL infusion, 945 mg, Intravenous, Clinic/[facility] 1 time, 14 of 14 cycles  Administration: 900 mg (5/6/2020), 900 mg (5/20/2020), 900 mg (6/3/2020), 900 mg (6/17/2020), 945 mg (6/30/2020), 945 mg (7/20/2020), 945 mg (7/29/2020), 945 mg (8/12/2020), 945 mg (8/26/2020), 945 mg (9/9/2020), 945 mg (9/23/2020), 945 mg (10/6/2020), 945 mg (10/21/2020), 945 mg (11/4/2020)  oxaliplatin  (ELOXATIN) 200 mg in dextrose 5 % 500 mL chemo infusion, 201 mg, Intravenous, Clinic/[facility] 1 time, 6 of 6 cycles  Dose modification: 65 mg/m2 (original dose 85 mg/m2, Cycle 6)  Administration: 200 mg (5/6/2020), 200 mg (5/20/2020), 200 mg (6/3/2020), 200 mg (6/17/2020), 201 mg (6/30/2020), 150 mg (7/20/2020)     6/16/2021 - 8/2/2023 Chemotherapy    Treatment Summary   Plan Name: OP COLORECTAL FOLFIRI + BEVACIZUMAB Q2W  Treatment Goal: Control  Status: Inactive  Start Date: 6/16/2021  End Date: 8/2/2023  Provider: Marah Santo MD  Chemotherapy: fluorouraciL injection 990 mg, 400 mg/m2 = 990 mg, Intravenous, Clinic/[facility] 1 time, 15 of 15 cycles  Administration: 990 mg (6/16/2021), 990 mg (6/30/2021), 990 mg (7/14/2021), 980 mg (7/28/2021), 990 mg (8/11/2021), 990 mg (8/25/2021), 990 mg (9/8/2021), 990 mg (9/22/2021), 990 mg (10/6/2021), 990 mg (10/20/2021), 990 mg (11/3/2021), 990 mg (12/1/2021), 990 mg (12/15/2021), 990 mg (11/17/2021), 990 mg (12/28/2021)  fluorouraciL 2,400 mg/m2 = 5,950 mg in sodium chloride 0.9% 240 mL chemo infusion, 2,400 mg/m2 = 5,950 mg, Intravenous, Over 46 hours, 43 of 46 cycles  Administration: 5,950 mg (6/16/2021), 5,950 mg (6/30/2021), 5,950 mg (7/14/2021), 5,880 mg (7/28/2021), 5,930 mg (8/11/2021), 5,930 mg (8/25/2021), 5,930 mg (9/8/2021), 5,930 mg (9/22/2021), 5,930 mg (10/6/2021), 5,930 mg (10/20/2021), 5,930 mg (11/3/2021), 5,930 mg (12/1/2021), 5,930 mg (12/15/2021), 5,930 mg (11/17/2021), 5,930 mg (12/28/2021), 6,050 mg (5/11/2022), 6,025 mg (3/9/2022), 6,025 mg (2/23/2022), 5,930 mg (2/2/2022), 5,930 mg (1/19/2022), 6,050 mg (4/20/2022), 6,025 mg (4/6/2022), 6,025 mg (3/23/2022), 6,050 mg (6/1/2022), 6,050 mg (6/15/2022), 6,050 mg (10/19/2022), 6,050 mg (10/5/2022), 6,050 mg (11/2/2022), 6,050 mg (11/16/2022), 6,050 mg (11/30/2022), 6,050 mg (1/4/2023), 6,050 mg (12/19/2022), 6,050 mg (1/18/2023), 6,000 mg (5/22/2023), 6,000 mg (4/26/2023), 6,000 mg (4/11/2023), 6,000 mg  (2/28/2023), 6,050 mg (2/14/2023), 6,000 mg (2/1/2023), 6,000 mg (7/5/2023), 6,000 mg (6/19/2023), 6,000 mg (6/5/2023), 6,000 mg (7/31/2023)  bevacizumab (AVASTIN) 600 mg in sodium chloride 0.9% 100 mL chemo infusion, 660 mg, Intravenous, [facility] 1 time, 36 of 36 cycles  Dose modification: 5 mg/kg (original dose 5 mg/kg, Cycle 26, Reason: MD Discretion, Comment: 5 mg/kg original dose)  Administration: 600 mg (6/30/2021), 600 mg (7/14/2021), 600 mg (7/28/2021), 600 mg (6/16/2021), 600 mg (8/11/2021), 655 mg (8/25/2021), 600 mg (9/8/2021), 600 mg (9/22/2021), 600 mg (10/6/2021), 600 mg (10/20/2021), 600 mg (11/3/2021), 655 mg (12/1/2021), 600 mg (12/15/2021), 600 mg (11/17/2021), 600 mg (12/28/2021), 600 mg (5/11/2022), 600 mg (3/9/2022), 600 mg (2/23/2022), 600 mg (2/2/2022), 600 mg (1/19/2022), 600 mg (4/20/2022), 680 mg (4/6/2022), 600 mg (3/23/2022), 680 mg (10/5/2022), 680 mg (10/19/2022), 680 mg (11/2/2022), 680 mg (11/16/2022), 680 mg (11/30/2022), 680 mg (1/4/2023), 680 mg (12/19/2022), 680 mg (1/18/2023), 680 mg (3/28/2023), 680 mg (3/14/2023), 680 mg (2/28/2023), 680 mg (2/14/2023), 680 mg (2/1/2023)  irinotecan (CAMPTOSAR) 440 mg in sodium chloride 0.9% 500 mL chemo infusion, 446 mg, Intravenous, [facility] 1 time, 45 of 48 cycles  Dose modification: 180 mg/m2 (original dose 180 mg/m2, Cycle 24)  Administration: 440 mg (6/16/2021), 440 mg (6/30/2021), 440 mg (7/14/2021), 440 mg (7/28/2021), 440 mg (8/11/2021), 444 mg (8/25/2021), 440 mg (9/8/2021), 440 mg (9/22/2021), 440 mg (10/6/2021), 440 mg (10/20/2021), 440 mg (11/3/2021), 440 mg (12/1/2021), 440 mg (12/15/2021), 440 mg (11/17/2021), 440 mg (12/28/2021), 440 mg (5/11/2022), 440 mg (3/9/2022), 440 mg (2/23/2022), 440 mg (2/2/2022), 440 mg (1/19/2022), 440 mg (4/20/2022), 440 mg (4/6/2022), 440 mg (3/24/2022), 440 mg (6/1/2022), 440 mg (6/15/2022), 440 mg (10/19/2022), 440 mg (10/5/2022), 440 mg (11/2/2022), 440 mg (11/16/2022), 440 mg (11/30/2022),  440 mg (1/4/2023), 440 mg (12/19/2022), 440 mg (1/18/2023), 440 mg (5/22/2023), 440 mg (4/26/2023), 440 mg (4/11/2023), 440 mg (3/28/2023), 440 mg (3/14/2023), 440 mg (2/28/2023), 440 mg (2/14/2023), 440 mg (2/1/2023), 440 mg (7/5/2023), 440 mg (6/19/2023), 440 mg (6/5/2023), 440 mg (7/31/2023)  bevacizumab-awwb (MVASI) 5 mg/kg = 680 mg in sodium chloride 0.9% 100 mL infusion, 5 mg/kg = 680 mg (100 % of original dose 5 mg/kg), Intravenous, Clinic/[facility] 1 time, 7 of 10 cycles  Dose modification: 5 mg/kg (original dose 5 mg/kg, Cycle 39)  Administration: 680 mg (4/11/2023), 680 mg (4/26/2023), 680 mg (5/22/2023), 680 mg (7/5/2023), 680 mg (6/19/2023), 680 mg (6/5/2023), 680 mg (7/31/2023)     8/17/2023 - 8/18/2023 Chemotherapy    Treatment Summary   Plan Name: OP CETUXIMAB 500MG Q2W  Treatment Goal: Control  Status: Inactive  Start Date:   End Date:   Provider: Marah Santo MD  Chemotherapy: [No matching medication found in this treatment plan]     8/24/2023 - 12/29/2023 Chemotherapy    Treatment Summary   Plan Name: [facility] - ARM 1 ENCORAFENIB  CETUXIMAB NIVOLUMAB  Treatment Goal: Palliative  Status: Inactive  Start Date: 8/24/2023  End Date: 12/29/2023  Provider: Marah Santo MD  Chemotherapy: cetuximab (ERBITUX) 100 mg/50 mL chemo infusion 1,200 mg, 1,230 mg, Intravenous, Clinic/HOD 1 time, 5 of 12 cycles  Administration: 1,200 mg (8/24/2023), 1,200 mg (9/8/2023), 1,200 mg (9/22/2023), 1,200 mg (10/6/2023), 1,200 mg (10/20/2023), 1,200 mg (11/3/2023), 1,200 mg (11/17/2023), 1,200 mg (12/1/2023), 1,200 mg (12/15/2023), 1,200 mg (12/29/2023)  encorafenib 75 mg Cap, 300 mg, Oral, Daily, 1 of 1 cycle, Start date: --, End date: --         History:  Past Medical History:   Diagnosis Date    Anxiety     Depression     FH: ovarian cancer 03/16/2020    Hx of psychiatric care     Effexor, Paxil, Lexapro, Zoloft, Wellbutrin, Trazodone Buspar    Hyperthyroidism     Hypothyroid     Kidney calculi     Malignant neoplasm  of sigmoid colon 2020    Menorrhagia     Multinodular goiter 2012    Palpitation     Psychiatric problem     Venous insufficiency       Past Surgical History:   Procedure Laterality Date     SECTION, CLASSIC      x3    COLONOSCOPY N/A 2020    Procedure: COLONOSCOPY;  Surgeon: Shane Parker MD;  Location: Psychiatric;  Service: Endoscopy;  Laterality: N/A;    COLONOSCOPY N/A 2022    Procedure: COLONOSCOPY;  Surgeon: Shane Parker MD;  Location: [facility] ENDO;  Service: Endoscopy;  Laterality: N/A;    COLONOSCOPY N/A 2023    Procedure: COLONOSCOPY;  Surgeon: Shane Parker MD;  Location: [facility];  Service: Endoscopy;  Laterality: N/A;  no cecum anastomosis    CYSTOSCOPY W/ URETERAL STENT PLACEMENT Bilateral 3/25/2020    Procedure: CYSTOSCOPY, WITH URETERAL STENT INSERTION;  Surgeon: Claudio Tyson MD;  Location: [address];  Service: Urology;  Laterality: Bilateral;    INSERTION OF TUNNELED CENTRAL VENOUS CATHETER (CVC) WITH SUBCUTANEOUS PORT N/A 2020    Procedure: [id number]-V-[id number];  Surgeon: [facility] Diagnostic Provider;  Location: [facility] OR [address] [place] [address];  Service: Radiology;  Laterality: N/A;  Room 189/Cindy    LAPAROSCOPIC SIGMOIDECTOMY N/A 3/25/2020    Procedure: COLECTOMY, SIGMOID, LAPAROSCOPIC, flex sig, ERAS high;  Surgeon: Silvio Man MD;  Location: [address];  Service: Colon and Rectal;  Laterality: N/A;    MOBILIZATION OF SPLENIC FLEXURE  3/25/2020    Procedure: MOBILIZATION, SPLENIC FLEXURE;  Surgeon: Silvio Man MD;  Location: [facility] OR [address] [place] [address];  Service: Colon and Rectal;;    tonsillectomy      TOTAL ABDOMINAL HYSTERECTOMY W/ BILATERAL SALPINGOOPHORECTOMY N/A 3/25/2020    Procedure: HYSTERECTOMY, TOTAL, ABDOMINAL, WITH BILATERAL SALPINGO-OOPHORECTOMY;  Surgeon: Keron Brady MD;  Location: [facility] OR [address] [place] [address];  Service: Oncology;  Laterality: N/A;     Family History   Problem Relation Age of Onset    Heart disease Father      Diabetes Mother 65    Drug abuse Brother     Drug abuse Brother     Cancer Maternal Aunt         lung cancer    Cancer Maternal Grandmother         stomach cancer- started in ovaries    Ovarian cancer Maternal Grandmother         stomach cancer- started in ovaries    Colon cancer Paternal Uncle     Cancer Paternal Uncle     Ovarian cancer Maternal Aunt     Ovarian cancer Maternal Aunt     Ovarian cancer Cousin         mother had ovarian cancer    Drug abuse Son     Drug abuse Son         clean/sober since 2012    Colon cancer Son     No Known Problems Daughter     Uterine cancer Neg Hx     Breast cancer Neg Hx      Social History     Tobacco Use    Smoking status: Never    Smokeless tobacco: Never   Substance and Sexual Activity    Alcohol use: Yes     Comment: occasionally- twice monthly    Drug use: Never    Sexual activity: Yes     Partners: Male     Birth control/protection: See Surgical Hx        ROS:   Review of Systems   Constitutional:  Positive for malaise/fatigue. Negative for fever and weight loss.   HENT:  Negative for congestion, hearing loss, nosebleeds and sore throat.    Eyes:  Negative for double vision and photophobia.   Respiratory:  Negative for cough, hemoptysis, sputum production, shortness of breath and wheezing.    Cardiovascular:  Negative for chest pain, palpitations, orthopnea and leg swelling.   Gastrointestinal:  Positive for abdominal pain. Negative for blood in stool, constipation, diarrhea, heartburn and vomiting.   Genitourinary:  Positive for frequency and urgency. Negative for dysuria and hematuria.   Musculoskeletal:  Negative for back pain, joint pain and myalgias.   Skin:  Positive for rash. Negative for itching.   Neurological:  Negative for dizziness, tingling, seizures, weakness and headaches.   Endo/Heme/Allergies:  Negative for polydipsia. Does not bruise/bleed easily.   Psychiatric/Behavioral:  Negative for depression and memory loss. The patient is nervous/anxious and has  "insomnia (improving).         Objective:     Vitals:    01/11/24 1332   BP: 123/76   Pulse: 98   Resp: 18   Temp: 97.5 °F (36.4 °C)   TempSrc: Temporal   SpO2: 97%   Weight: 125.3 kg (276 lb 3.8 oz)   Height: 5' 6" (1.676 m)   PainSc: 0-No pain         Wt Readings from Last 10 Encounters:   01/11/24 125.3 kg (276 lb 3.8 oz)   01/09/24 127.7 kg (281 lb 8.4 oz)   01/02/24 128.7 kg (283 lb 11.7 oz)   12/29/23 129.5 kg (285 lb 7.9 oz)   12/29/23 129.5 kg (285 lb 7.9 oz)   12/15/23 128.7 kg (283 lb 11.7 oz)   12/14/23 128.7 kg (283 lb 11.7 oz)   12/01/23 129.9 kg (286 lb 6 oz)   11/30/23 129.9 kg (286 lb 6 oz)   11/22/23 129.6 kg (285 lb 11.5 oz)       Physical Examination:   Physical Exam  Vitals and nursing note reviewed.   Constitutional:       General: She is not in acute distress.     Appearance: She is not diaphoretic.   HENT:      Head: Normocephalic.      Mouth/Throat:      Pharynx: No oropharyngeal exudate.   Eyes:      General: No scleral icterus.     Conjunctiva/sclera: Conjunctivae normal.   Neck:      Thyroid: No thyromegaly.   Cardiovascular:      Rate and Rhythm: Normal rate and regular rhythm.      Heart sounds: Normal heart sounds. No murmur heard.  Pulmonary:      Effort: Pulmonary effort is normal. No respiratory distress.      Breath sounds: No stridor. No wheezing or rales.   Chest:      Chest wall: No tenderness.   Abdominal:      General: Bowel sounds are normal. There is no distension.      Palpations: Abdomen is soft. There is no mass.      Tenderness: There is no abdominal tenderness. There is no rebound.   Musculoskeletal:         General: No tenderness or deformity. Normal range of motion.      Cervical back: Neck supple.   Lymphadenopathy:      Cervical: No cervical adenopathy.   Skin:     General: Skin is warm and dry.      Findings: Rash (grade 1 facial rash, at baseline) present. No erythema.   Neurological:      Mental Status: She is alert and oriented to person, place, and time.      " Cranial Nerves: No cranial nerve deficit.      Coordination: Coordination normal.      Gait: Gait is intact.   Psychiatric:         Mood and Affect: Affect normal.         Cognition and Memory: Memory normal.         Judgment: Judgment normal.          Diagnostic Tests:  Significant Imaging: I have reviewed and interpreted all pertinent imaging results/findings.  Laboratory Data:  All pertinent labs have been reviewed.    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 Oxaliplatin was stopped due to severe adverse reaction.     Follow-up CTs and PET in May 2021 showed enlarging retroperitoneal node, concerning for metastatic disease.      She started FOLFIRI + Avastin and has continued till July 2022.   Given isolated RP toni disease, she has undergone open Left periaortic and aortocaval lymph node dissection on 7/12/2022 at [facility]. Resumed FOLFIRI+ Debo with relatively stable disease but now has disease progression on scans in August 2023.     She has been enrolled into  SWOG 2107 (encorafenib/cetuximab + nivo) , initial scans showed improvement, second scans showed some new lesions, now growing after a short term repeat.  As per Recist calculations, the target lesions are stable. However, there is  POD in the left paracolic gutter, seen on PET  scan as well. Will take her off of trial at this time.     Will initiate next line of SOC treatment with Lonsurf and Avastin.  Look for early phase trials, will follow up with PTCP team.  Will also look into options at [facility]. She has seen Dr. Montelongo previously and I have emailed him.     Vulvar lesion  Plan for resection noted. Follow up pathology.      Grade 1 dermatitis   Noted, continue topical hydrocortisone and clindamycin gel, remains on oral doxycycline.     Cystitis non infective   Grade 1.   Check U/A, treat if needed.     Neoplasm pain  Improving. Follow up with palliative care.     Hydronephrosis   Follow up with Urology for co management. No acute intervention was recommended.     Nausea   Continue compazine as needed, improving.     Constipation  Start sennakot and daily Miralax.     Transaminitis  Fluctuates.  Continue to monitor. No obvious liver mets.     Hypercalcemia   Mild, secondary to hyperparathyroidism.  Avoid calcium supplements. Continue Endocrine follow up.     Hypothyroidism  Multinodular goiter   Continue Levothyroxine. Recent dose adjustment noted.   Had a non diagnostic biopsy in 2012, usg findings have remained stable. Reviewed repeat thyroid US, stable, will follow up with Endocrine.     Essential HTN   Continue antihypertensives.      Anxiety   Continue to  follow up with Onc psych.    Vaginal candidosis   Responded to fluconazole, monitor.    MDM includes  :    - Acute or chronic illness or injury that poses a threat to life or bodily function  - Independent review and explanation of 3+ results from unique tests  - Discussion of management and ordering 3+ unique tests  - Extensive discussion of treatment and management  - Prescription drug management  - Drug therapy requiring intensive monitoring for toxicity          ECOG SCORE               Discussion:   No follow-ups on file.    Plan was discussed with the patient at length, and she verbalized understanding. Gail was given an  opportunity to ask questions that were answered to her satisfaction, and she was advised to call in the interval if any problems or questions arise.    Electronically signed by Marah Santo MD        Route Chart for Scheduling    Med Onc Chart Routing      Follow up with physician . 1/22 overbook anywhere   Follow up with TAVO    Infusion scheduling note    Injection scheduling note    Labs    Imaging    Pharmacy appointment    Other referrals                    Supportive Plan Information  IV FLUIDS AND ELECTROLYTES   Brayden Solo MD   Upcoming Treatment Dates - IV FLUIDS AND ELECTROLYTES    No upcoming days in selected categories.    Therapy Plan Information  PORT FLUSH  Flushes  heparin, porcine (PF) 100 unit/mL injection flush 500 Units  500 Units, Intravenous, Every visit  sodium chloride 0.9% flush 10 mL  10 mL, Intravenous, Every visit

## 2024-01-12 NOTE — TELEPHONE ENCOUNTER
Returned call to pt she wanted to cancel all of her current PT appointments until she finds out what is going on with the growth she discovered.  Will get rescheduled once she finds out what the status of that is after 1/30

## 2024-01-12 NOTE — TELEPHONE ENCOUNTER
RD received call from patient today; pt wanted to let RD know that they stopped the treatment she was on d/t treatment progression. She reports feelings of sadness but hoping to get on a new treatment, waiting on insurance approval.     Pt asking about ONS options. At one point, this RD tried to get patient Ensure Max Protein through her Medicaid but was unsuccessful d/t back orders. Pt asking if this is something RD can try again. This RD has been asked by Mariela RN who helps pt with clinical trials to help her boost her protein intake d/t hypoalbuminemia-Grade 1. Pt states today she is eating okay but sometimes it is hard for her to get in enough. She feels the ONS help her consume adequate protein. Today pt states she would prefer Ensure Max Protein but will take any substitution. RD suggested a couple other things, including Boost High Protein. Pt willing to try anything. Of note, patient's weight has been trending down.    Pt also asked about if she is still eligible for TFP. RD explained patient eligible for TFP as long as she is in active tx. Pt appreciative.     RD provided pt with contact information to call back with any problems.      Wt Readings from Last 10 Encounters:   01/11/24 125.3 kg (276 lb 3.8 oz)   01/09/24 127.7 kg (281 lb 8.4 oz)   01/02/24 128.7 kg (283 lb 11.7 oz)   12/29/23 129.5 kg (285 lb 7.9 oz)   12/29/23 129.5 kg (285 lb 7.9 oz)   12/15/23 128.7 kg (283 lb 11.7 oz)   12/14/23 128.7 kg (283 lb 11.7 oz)   12/01/23 129.9 kg (286 lb 6 oz)   11/30/23 129.9 kg (286 lb 6 oz)   11/22/23 129.6 kg (285 lb 11.5 oz)        Raquel Bullock 01/12/2024 10:23 AM

## 2024-01-17 PROBLEM — D50.0 IRON DEFICIENCY ANEMIA DUE TO CHRONIC BLOOD LOSS: Status: ACTIVE | Noted: 2024-01-17

## 2024-01-18 NOTE — PROGRESS NOTES
PSYCHO-ONCOLOGY NOTE/ Individual Psychotherapy     Date: 1/18/2024   Site:  [facility]      Therapeutic Intervention: Met with patient.  Outpatient - Supportive psychotherapy 45 min - CPT Code 06801    This includes face to face time and non-face to face time preparing to see the patient, obtaining and/or reviewing separately obtained history, documenting clinical information in the electronic or other health record, independently interpreting results and communicating results to the patient/family/caregiver, or care coordinator.      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    Anxiety    Insomnia    Insomnia    Other constipation    Nausea and vomiting    Mucositis due to chemotherapy    Morbid obesity    Secondary adenocarcinoma of lymph node    Thyroid nodule    Hypercalcemia    Transaminitis    Hypophosphatemia    Functional diarrhea    Primary hypertension    Diarrhea of presumed infectious origin    Calculus of gallbladder without cholecystitis without obstruction    ACP (advance care planning)    Abdominal pain    Shortness of breath    Secondary malignant neoplasm of retroperitoneum    Depressive disorder    Hypothyroidism    Immunodeficiency due to chemotherapy    Acneiform rash    Chronic midline low back pain without sciatica    Chemotherapy-induced fatigue    Vulval lesion    Iron deficiency anemia due to chronic blood loss       Chief complaint/reason for encounter: anger and anxiety   Met with patient to evaluate psychosocial adaptation to diagnosis/treatment/survivorship of colon cancer    Current Medications  Current Outpatient Medications    Medication    acetaminophen (TYLENOL) 500 MG tablet    buPROPion (WELLBUTRIN SR) 150 MG TBSR 12 hr tablet    cefUROXime (CEFTIN) 500 MG tablet    clindamycin phosphate 1% (CLINDAGEL) 1 % gel    doxycycline (VIBRA-TABS) 100 MG tablet    DULoxetine (CYMBALTA) 30 MG capsule    ergocalciferol (ERGOCALCIFEROL) 50,000 unit Cap    granisetron (SANCUSO) 3.1 mg/24 hour    ibuprofen (ADVIL,MOTRIN) 600 MG tablet    Lactobacillus rhamnosus GG (CULTURELLE) 10 billion cell capsule    levothyroxine (SYNTHROID) 175 MCG tablet    LIDOcaine-prilocaine (EMLA) cream    LORazepam (ATIVAN) 1 MG tablet    magic mouthwash diphen/antac/lidoc/nysta    mirtazapine (REMERON) 7.5 MG Tab    multivitamin (THERAGRAN) per tablet    olmesartan (BENICAR) 20 MG tablet    ondansetron (ZOFRAN-ODT) 8 MG TbDL    oxyCODONE (ROXICODONE) 5 MG immediate release tablet    senna (SENOKOT) 8.6 mg tablet    trifluridine-tipiraciL (LONSURF) 20-8.19 mg Tab     No current facility-administered medications for this visit.     Facility-Administered Medications Ordered in Other Visits   Medication Frequency    HYDROmorphone injection 0.5 mg Q5 Min PRN    ondansetron injection 4 mg Daily PRN    oxyCODONE-acetaminophen 5-325 mg per tablet 1 tablet Q3H PRN    sodium chloride 0.9% flush 3 mL PRN       ONCOLOGY HISTORY  Oncology History   Malignant neoplasm of sigmoid colon   3/16/2020 Initial Diagnosis    Malignant neoplasm of sigmoid colon     3/31/2020 Cancer Staged    Staging form: Colon and Rectum, AJCC 8th Edition  - Clinical stage from 3/31/2020: Stage IIIC (cT4b, cN2a, cM0)     5/6/2020 - 11/17/2020 Chemotherapy    Treatment Summary   Plan Name: OP FOLFOX 6 Q2W  Treatment Goal: Curative  Status: Inactive  Start Date: 5/6/2020  End Date: 11/6/2020  Provider: Dylan Leyva MD  Chemotherapy: fluorouraciL injection 945 mg, 400 mg/m2 = 945 mg, Intravenous, Clinic/[facility] 1 time, 14 of 14 cycles  Administration: 945 mg (5/6/2020), 945 mg (5/20/2020), 945 mg (6/3/2020),  945 mg (6/17/2020), 945 mg (7/29/2020), 945 mg (8/12/2020), 945 mg (8/26/2020), 945 mg (9/9/2020), 945 mg (9/23/2020), 945 mg (10/6/2020), 945 mg (10/21/2020), 945 mg (11/4/2020)  fluorouraciL 2,400 mg/m2 = 5,665 mg in sodium chloride 0.9% 240 mL chemo infusion, 2,400 mg/m2 = 5,665 mg, Intravenous, Over 46 hours, 14 of 14 cycles  Administration: 5,665 mg (5/6/2020), 5,665 mg (5/20/2020), 5,665 mg (6/3/2020), 5,665 mg (6/17/2020), 5,665 mg (7/29/2020), 5,665 mg (8/12/2020), 5,665 mg (8/26/2020), 5,665 mg (9/9/2020), 5,665 mg (9/23/2020), 5,665 mg (10/6/2020), 5,665 mg (10/21/2020), 5,665 mg (11/4/2020)  leucovorin calcium 900 mg in dextrose 5 % 250 mL infusion, 945 mg, Intravenous, Clinic/[facility] 1 time, 14 of 14 cycles  Administration: 900 mg (5/6/2020), 900 mg (5/20/2020), 900 mg (6/3/2020), 900 mg (6/17/2020), 945 mg (6/30/2020), 945 mg (7/20/2020), 945 mg (7/29/2020), 945 mg (8/12/2020), 945 mg (8/26/2020), 945 mg (9/9/2020), 945 mg (9/23/2020), 945 mg (10/6/2020), 945 mg (10/21/2020), 945 mg (11/4/2020)  oxaliplatin (ELOXATIN) 200 mg in dextrose 5 % 500 mL chemo infusion, 201 mg, Intravenous, Clinic/[facility] 1 time, 6 of 6 cycles  Dose modification: 65 mg/m2 (original dose 85 mg/m2, Cycle 6)  Administration: 200 mg (5/6/2020), 200 mg (5/20/2020), 200 mg (6/3/2020), 200 mg (6/17/2020), 201 mg (6/30/2020), 150 mg (7/20/2020)     5/3/2021 Cancer Staged    Staging form: Colon and Rectum, AJCC 8th Edition  - Clinical stage from 5/3/2021: Stage Unknown (rcT0, cNX, cM1)     6/16/2021 - 8/2/2023 Chemotherapy    Treatment Summary   Plan Name: OP COLORECTAL FOLFIRI + BEVACIZUMAB Q2W  Treatment Goal: Control  Status: Inactive  Start Date: 6/16/2021  End Date: 8/2/2023  Provider: Marah Santo MD  Chemotherapy: fluorouraciL injection 990 mg, 400 mg/m2 = 990 mg, Intravenous, Clinic/[facility] 1 time, 15 of 15 cycles  Administration: 990 mg (6/16/2021), 990 mg (6/30/2021), 990 mg (7/14/2021), 980 mg (7/28/2021), 990 mg (8/11/2021), 990 mg  (8/25/2021), 990 mg (9/8/2021), 990 mg (9/22/2021), 990 mg (10/6/2021), 990 mg (10/20/2021), 990 mg (11/3/2021), 990 mg (12/1/2021), 990 mg (12/15/2021), 990 mg (11/17/2021), 990 mg (12/28/2021)  fluorouraciL 2,400 mg/m2 = 5,950 mg in sodium chloride 0.9% 240 mL chemo infusion, 2,400 mg/m2 = 5,950 mg, Intravenous, Over 46 hours, 43 of 46 cycles  Administration: 5,950 mg (6/16/2021), 5,950 mg (6/30/2021), 5,950 mg (7/14/2021), 5,880 mg (7/28/2021), 5,930 mg (8/11/2021), 5,930 mg (8/25/2021), 5,930 mg (9/8/2021), 5,930 mg (9/22/2021), 5,930 mg (10/6/2021), 5,930 mg (10/20/2021), 5,930 mg (11/3/2021), 5,930 mg (12/1/2021), 5,930 mg (12/15/2021), 5,930 mg (11/17/2021), 5,930 mg (12/28/2021), 6,050 mg (5/11/2022), 6,025 mg (3/9/2022), 6,025 mg (2/23/2022), 5,930 mg (2/2/2022), 5,930 mg (1/19/2022), 6,050 mg (4/20/2022), 6,025 mg (4/6/2022), 6,025 mg (3/23/2022), 6,050 mg (6/1/2022), 6,050 mg (6/15/2022), 6,050 mg (10/19/2022), 6,050 mg (10/5/2022), 6,050 mg (11/2/2022), 6,050 mg (11/16/2022), 6,050 mg (11/30/2022), 6,050 mg (1/4/2023), 6,050 mg (12/19/2022), 6,050 mg (1/18/2023), 6,000 mg (5/22/2023), 6,000 mg (4/26/2023), 6,000 mg (4/11/2023), 6,000 mg (2/28/2023), 6,050 mg (2/14/2023), 6,000 mg (2/1/2023), 6,000 mg (7/5/2023), 6,000 mg (6/19/2023), 6,000 mg (6/5/2023), 6,000 mg (7/31/2023)  bevacizumab (AVASTIN) 600 mg in sodium chloride 0.9% 100 mL chemo infusion, 660 mg, Intravenous, [facility] 1 time, 36 of 36 cycles  Dose modification: 5 mg/kg (original dose 5 mg/kg, Cycle 26, Reason: MD Discretion, Comment: 5 mg/kg original dose)  Administration: 600 mg (6/30/2021), 600 mg (7/14/2021), 600 mg (7/28/2021), 600 mg (6/16/2021), 600 mg (8/11/2021), 655 mg (8/25/2021), 600 mg (9/8/2021), 600 mg (9/22/2021), 600 mg (10/6/2021), 600 mg (10/20/2021), 600 mg (11/3/2021), 655 mg (12/1/2021), 600 mg (12/15/2021), 600 mg (11/17/2021), 600 mg (12/28/2021), 600 mg (5/11/2022), 600 mg (3/9/2022), 600 mg (2/23/2022), 600 mg  (2/2/2022), 600 mg (1/19/2022), 600 mg (4/20/2022), 680 mg (4/6/2022), 600 mg (3/23/2022), 680 mg (10/5/2022), 680 mg (10/19/2022), 680 mg (11/2/2022), 680 mg (11/16/2022), 680 mg (11/30/2022), 680 mg (1/4/2023), 680 mg (12/19/2022), 680 mg (1/18/2023), 680 mg (3/28/2023), 680 mg (3/14/2023), 680 mg (2/28/2023), 680 mg (2/14/2023), 680 mg (2/1/2023)  irinotecan (CAMPTOSAR) 440 mg in sodium chloride 0.9% 500 mL chemo infusion, 446 mg, Intravenous, [facility]\"" 1 time, 45 of 48 cycles  Dose modification: 180 mg/m2 (original dose 180 mg/m2, Cycle 24)  Administration: 440 mg (6/16/2021), 440 mg (6/30/2021), 440 mg (7/14/2021), 440 mg (7/28/2021), 440 mg (8/11/2021), 444 mg (8/25/2021), 440 mg (9/8/2021), 440 mg (9/22/2021), 440 mg (10/6/2021), 440 mg (10/20/2021), 440 mg (11/3/2021), 440 mg (12/1/2021), 440 mg (12/15/2021), 440 mg (11/17/2021), 440 mg (12/28/2021), 440 mg (5/11/2022), 440 mg (3/9/2022), 440 mg (2/23/2022), 440 mg (2/2/2022), 440 mg (1/19/2022), 440 mg (4/20/2022), 440 mg (4/6/2022), 440 mg (3/24/2022), 440 mg (6/1/2022), 440 mg (6/15/2022), 440 mg (10/19/2022), 440 mg (10/5/2022), 440 mg (11/2/2022), 440 mg (11/16/2022), 440 mg (11/30/2022), 440 mg (1/4/2023), 440 mg (12/19/2022), 440 mg (1/18/2023), 440 mg (5/22/2023), 440 mg (4/26/2023), 440 mg (4/11/2023), 440 mg (3/28/2023), 440 mg (3/14/2023), 440 mg (2/28/2023), 440 mg (2/14/2023), 440 mg (2/1/2023), 440 mg (7/5/2023), 440 mg (6/19/2023), 440 mg (6/5/2023), 440 mg (7/31/2023)  bevacizumab-awwb (MVASI) 5 mg/kg = 680 mg in sodium chloride 0.9% 100 mL infusion, 5 mg/kg = 680 mg (100 % of original dose 5 mg/kg), Intravenous, Clinic/\[facility]\"" 1 time, 7 of 10 cycles  Dose modification: 5 mg/kg (original dose 5 mg/kg, Cycle 39)  Administration: 680 mg (4/11/2023), 680 mg (4/26/2023), 680 mg (5/22/2023), 680 mg (7/5/2023), 680 mg (6/19/2023), 680 mg (6/5/2023), 680 mg (7/31/2023)     8/17/2023 - 8/18/2023 Chemotherapy    Treatment Summary   Plan Name: OP  CETUXIMAB 500MG Q2W  Treatment Goal: Control  Status: Inactive  Start Date:   End Date:   Provider: Marah Santo MD  Chemotherapy: [No matching medication found in this treatment plan]     8/24/2023 - 12/29/2023 Chemotherapy    Treatment Summary   Plan Name: [facility] - ARM 1 ENCORAFENIB  CETUXIMAB NIVOLUMAB  Treatment Goal: Palliative  Status: Inactive  Start Date: 8/24/2023  End Date: 12/29/2023  Provider: Marah Santo MD  Chemotherapy: cetuximab (ERBITUX) 100 mg/50 mL chemo infusion 1,200 mg, 1,230 mg, Intravenous, Clinic/[facility] 1 time, 5 of 12 cycles  Administration: 1,200 mg (8/24/2023), 1,200 mg (9/8/2023), 1,200 mg (9/22/2023), 1,200 mg (10/6/2023), 1,200 mg (10/20/2023), 1,200 mg (11/3/2023), 1,200 mg (11/17/2023), 1,200 mg (12/1/2023), 1,200 mg (12/15/2023), 1,200 mg (12/29/2023)  encorafenib 75 mg Cap, 300 mg, Oral, Daily, 1 of 1 cycle, Start date: --, End date: --     1/16/2024 -  Chemotherapy    Treatment Summary   Plan Name: OP BEVACIZUMAB Q4W  Treatment Goal: Control  Status: Active  Start Date: 1/16/2024 (Planned)  End Date: 12/3/2024 (Planned)  Provider: Marah Santo MD  Chemotherapy: bevacizumab-awwb (MVASI) 5 mg/kg = 625 mg in sodium chloride 0.9% 100 mL infusion, 5 mg/kg = 625 mg (original dose ), Intravenous, Clinic/[facility] 1 time, 0 of 12 cycles  Dose modification: 5 mg/kg (Cycle 1, Reason: Other (see comments), Comment: Given with Lonsurf)     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   4/3/2020 Initial Diagnosis    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     5/6/2020 - 11/17/2020 Chemotherapy    Treatment Summary   Plan Name: OP FOLFOX 6 Q2W  Treatment Goal: Curative  Status: Inactive  Start Date: 5/6/2020  End Date: 11/6/2020  Provider: Dylan Leyva MD  Chemotherapy: fluorouraciL injection 945 mg, 400 mg/m2 = 945 mg, Intravenous, Clinic/[facility] 1 time, 14 of 14 cycles  Administration: 945 mg (5/6/2020), 945 mg (5/20/2020), 945 mg (6/3/2020), 945 mg (6/17/2020), 945 mg (7/29/2020), 945 mg  (8/12/2020), 945 mg (8/26/2020), 945 mg (9/9/2020), 945 mg (9/23/2020), 945 mg (10/6/2020), 945 mg (10/21/2020), 945 mg (11/4/2020)  fluorouraciL 2,400 mg/m2 = 5,665 mg in sodium chloride 0.9% 240 mL chemo infusion, 2,400 mg/m2 = 5,665 mg, Intravenous, Over 46 hours, 14 of 14 cycles  Administration: 5,665 mg (5/6/2020), 5,665 mg (5/20/2020), 5,665 mg (6/3/2020), 5,665 mg (6/17/2020), 5,665 mg (7/29/2020), 5,665 mg (8/12/2020), 5,665 mg (8/26/2020), 5,665 mg (9/9/2020), 5,665 mg (9/23/2020), 5,665 mg (10/6/2020), 5,665 mg (10/21/2020), 5,665 mg (11/4/2020)  leucovorin calcium 900 mg in dextrose 5 % 250 mL infusion, 945 mg, Intravenous, Clinic/[facility] 1 time, 14 of 14 cycles  Administration: 900 mg (5/6/2020), 900 mg (5/20/2020), 900 mg (6/3/2020), 900 mg (6/17/2020), 945 mg (6/30/2020), 945 mg (7/20/2020), 945 mg (7/29/2020), 945 mg (8/12/2020), 945 mg (8/26/2020), 945 mg (9/9/2020), 945 mg (9/23/2020), 945 mg (10/6/2020), 945 mg (10/21/2020), 945 mg (11/4/2020)  oxaliplatin (ELOXATIN) 200 mg in dextrose 5 % 500 mL chemo infusion, 201 mg, Intravenous, Clinic/[facility] 1 time, 6 of 6 cycles  Dose modification: 65 mg/m2 (original dose 85 mg/m2, Cycle 6)  Administration: 200 mg (5/6/2020), 200 mg (5/20/2020), 200 mg (6/3/2020), 200 mg (6/17/2020), 201 mg (6/30/2020), 150 mg (7/20/2020)     6/16/2021 - 8/2/2023 Chemotherapy    Treatment Summary   Plan Name: OP COLORECTAL FOLFIRI + BEVACIZUMAB Q2W  Treatment Goal: Control  Status: Inactive  Start Date: 6/16/2021  End Date: 8/2/2023  Provider: Marah Santo MD  Chemotherapy: fluorouraciL injection 990 mg, 400 mg/m2 = 990 mg, Intravenous, Clinic/[facility] 1 time, 15 of 15 cycles  Administration: 990 mg (6/16/2021), 990 mg (6/30/2021), 990 mg (7/14/2021), 980 mg (7/28/2021), 990 mg (8/11/2021), 990 mg (8/25/2021), 990 mg (9/8/2021), 990 mg (9/22/2021), 990 mg (10/6/2021), 990 mg (10/20/2021), 990 mg (11/3/2021), 990 mg (12/1/2021), 990 mg (12/15/2021), 990 mg (11/17/2021), 990 mg  (12/28/2021)  fluorouraciL 2,400 mg/m2 = 5,950 mg in sodium chloride 0.9% 240 mL chemo infusion, 2,400 mg/m2 = 5,950 mg, Intravenous, Over 46 hours, 43 of 46 cycles  Administration: 5,950 mg (6/16/2021), 5,950 mg (6/30/2021), 5,950 mg (7/14/2021), 5,880 mg (7/28/2021), 5,930 mg (8/11/2021), 5,930 mg (8/25/2021), 5,930 mg (9/8/2021), 5,930 mg (9/22/2021), 5,930 mg (10/6/2021), 5,930 mg (10/20/2021), 5,930 mg (11/3/2021), 5,930 mg (12/1/2021), 5,930 mg (12/15/2021), 5,930 mg (11/17/2021), 5,930 mg (12/28/2021), 6,050 mg (5/11/2022), 6,025 mg (3/9/2022), 6,025 mg (2/23/2022), 5,930 mg (2/2/2022), 5,930 mg (1/19/2022), 6,050 mg (4/20/2022), 6,025 mg (4/6/2022), 6,025 mg (3/23/2022), 6,050 mg (6/1/2022), 6,050 mg (6/15/2022), 6,050 mg (10/19/2022), 6,050 mg (10/5/2022), 6,050 mg (11/2/2022), 6,050 mg (11/16/2022), 6,050 mg (11/30/2022), 6,050 mg (1/4/2023), 6,050 mg (12/19/2022), 6,050 mg (1/18/2023), 6,000 mg (5/22/2023), 6,000 mg (4/26/2023), 6,000 mg (4/11/2023), 6,000 mg (2/28/2023), 6,050 mg (2/14/2023), 6,000 mg (2/1/2023), 6,000 mg (7/5/2023), 6,000 mg (6/19/2023), 6,000 mg (6/5/2023), 6,000 mg (7/31/2023)  bevacizumab (AVASTIN) 600 mg in sodium chloride 0.9% 100 mL chemo infusion, 660 mg, Intravenous, Clinic/[facility] 1 time, 36 of 36 cycles  Dose modification: 5 mg/kg (original dose 5 mg/kg, Cycle 26, Reason: MD Discretion, Comment: 5 mg/kg original dose)  Administration: 600 mg (6/30/2021), 600 mg (7/14/2021), 600 mg (7/28/2021), 600 mg (6/16/2021), 600 mg (8/11/2021), 655 mg (8/25/2021), 600 mg (9/8/2021), 600 mg (9/22/2021), 600 mg (10/6/2021), 600 mg (10/20/2021), 600 mg (11/3/2021), 655 mg (12/1/2021), 600 mg (12/15/2021), 600 mg (11/17/2021), 600 mg (12/28/2021), 600 mg (5/11/2022), 600 mg (3/9/2022), 600 mg (2/23/2022), 600 mg (2/2/2022), 600 mg (1/19/2022), 600 mg (4/20/2022), 680 mg (4/6/2022), 600 mg (3/23/2022), 680 mg (10/5/2022), 680 mg (10/19/2022), 680 mg (11/2/2022), 680 mg (11/16/2022), 680 mg  (11/30/2022), 680 mg (1/4/2023), 680 mg (12/19/2022), 680 mg (1/18/2023), 680 mg (3/28/2023), 680 mg (3/14/2023), 680 mg (2/28/2023), 680 mg (2/14/2023), 680 mg (2/1/2023)  irinotecan (CAMPTOSAR) 440 mg in sodium chloride 0.9% 500 mL chemo infusion, 446 mg, Intravenous, Clinic/[facility] 1 time, 45 of 48 cycles  Dose modification: 180 mg/m2 (original dose 180 mg/m2, Cycle 24)  Administration: 440 mg (6/16/2021), 440 mg (6/30/2021), 440 mg (7/14/2021), 440 mg (7/28/2021), 440 mg (8/11/2021), 444 mg (8/25/2021), 440 mg (9/8/2021), 440 mg (9/22/2021), 440 mg (10/6/2021), 440 mg (10/20/2021), 440 mg (11/3/2021), 440 mg (12/1/2021), 440 mg (12/15/2021), 440 mg (11/17/2021), 440 mg (12/28/2021), 440 mg (5/11/2022), 440 mg (3/9/2022), 440 mg (2/23/2022), 440 mg (2/2/2022), 440 mg (1/19/2022), 440 mg (4/20/2022), 440 mg (4/6/2022), 440 mg (3/24/2022), 440 mg (6/1/2022), 440 mg (6/15/2022), 440 mg (10/19/2022), 440 mg (10/5/2022), 440 mg (11/2/2022), 440 mg (11/16/2022), 440 mg (11/30/2022), 440 mg (1/4/2023), 440 mg (12/19/2022), 440 mg (1/18/2023), 440 mg (5/22/2023), 440 mg (4/26/2023), 440 mg (4/11/2023), 440 mg (3/28/2023), 440 mg (3/14/2023), 440 mg (2/28/2023), 440 mg (2/14/2023), 440 mg (2/1/2023), 440 mg (7/5/2023), 440 mg (6/19/2023), 440 mg (6/5/2023), 440 mg (7/31/2023)  bevacizumab-awwb (MVASI) 5 mg/kg = 680 mg in sodium chloride 0.9% 100 mL infusion, 5 mg/kg = 680 mg (100 % of original dose 5 mg/kg), Intravenous, Clinic/[facility] 1 time, 7 of 10 cycles  Dose modification: 5 mg/kg (original dose 5 mg/kg, Cycle 39)  Administration: 680 mg (4/11/2023), 680 mg (4/26/2023), 680 mg (5/22/2023), 680 mg (7/5/2023), 680 mg (6/19/2023), 680 mg (6/5/2023), 680 mg (7/31/2023)     8/17/2023 - 8/18/2023 Chemotherapy    Treatment Summary   Plan Name: OP CETUXIMAB 500MG Q2W  Treatment Goal: Control  Status: Inactive  Start Date:   End Date:   Provider: Marah Santo MD  Chemotherapy: [No matching medication found in this treatment  plan]     8/24/2023 - 12/29/2023 Chemotherapy    Treatment Summary   Plan Name: [facility] - ARM 1 ENCORAFENIB  CETUXIMAB NIVOLUMAB  Treatment Goal: Palliative  Status: Inactive  Start Date: 8/24/2023  End Date: 12/29/2023  Provider: Marah Santo MD  Chemotherapy: cetuximab (ERBITUX) 100 mg/50 mL chemo infusion 1,200 mg, 1,230 mg, Intravenous, Clinic/[facility] 1 time, 5 of 12 cycles  Administration: 1,200 mg (8/24/2023), 1,200 mg (9/8/2023), 1,200 mg (9/22/2023), 1,200 mg (10/6/2023), 1,200 mg (10/20/2023), 1,200 mg (11/3/2023), 1,200 mg (11/17/2023), 1,200 mg (12/1/2023), 1,200 mg (12/15/2023), 1,200 mg (12/29/2023)  encorafenib 75 mg Cap, 300 mg, Oral, Daily, 1 of 1 cycle, Start date: --, End date: --     1/16/2024 -  Chemotherapy    Treatment Summary   Plan Name: OP BEVACIZUMAB Q4W  Treatment Goal: Control  Status: Active  Start Date: 1/16/2024 (Planned)  End Date: 12/3/2024 (Planned)  Provider: Marah Santo MD  Chemotherapy: bevacizumab-awwb (MVASI) 5 mg/kg = 625 mg in sodium chloride 0.9% 100 mL infusion, 5 mg/kg = 625 mg (original dose ), Intravenous, Clinic/[facility] 1 time, 0 of 12 cycles  Dose modification: 5 mg/kg (Cycle 1, Reason: Other (see comments), Comment: Given with Lonsurf)         Objective:  Gail Mckinnon arrived 15 minutes late for the session.  Ms. Mckinnon was independently ambulatory at the time of session. The patient was fully cooperative throughout the session.  Appearance: age appropriate, appropriately  dressed, adequately  groomed  Behavior/Cooperation: friendly and cooperative  Speech: normal in rate, volume, and tone  Mood: angry, dysthymic  Affect: mood congruent  Thought Process: goal-directed, logical  Thought Content: normal,  No delusions or paranoia; did not appear to be responding to internal stimuli during the session  Orientation: grossly intact  Memory: grossly intact  Attention Span/Concentration: Attends to session without distraction; reports no difficulty  Fund of  "Knowledge: average  Estimate of Intelligence: average from verbal skills and history  Cognition: grossly intact  Insight: patient has awareness of illness; good insight into own behavior and behavior of others  Judgment: the patient's behavior is adequate to circumstances    NCCN Distress thermometer:       1/11/2024     1:38 PM 1/9/2024    11:01 AM 12/29/2023     8:58 AM 12/10/2023    10:44 PM 11/29/2023    12:07 AM 11/16/2023     9:08 AM 10/29/2023    12:49 PM   DISTRESS SCREENING   Distress Score 6 0 - No Distress 4 5 6 0 - No Distress 4   Practical Concerns None of these None of these None of these Insurance/Financial;Treatment Decisions Insurance/Financial;Work/School None of these Insurance/Financial   Social Concerns None of these None of these None of these Dealing with Children Dealing with Children None of these Dealing with Children;Family Health Issues   Emotional Concerns Sadness or depression None of these None of these Depression;Sadness;Worry Depression;Sadness;Worry;Loss of Interest None of these Depression;Fears;Sadness;Worry   Retire Spiritual or Latter day Concerns    No No No No   Spiritual or Latter day Concerns None of these None of these None of these       Physical Concerns Fatigue None of these Fatigue Fatigue;Feeling Swollen Fatigue;Feeling Swollen;Skin Dry/Itchy None of these Memory/Concentration;Skin Dry/Itchy;Sleep   Other Problems "A little emotional recently"  Fatigue            Interval history and content of current session:  Discussed diagnosis, treatment, prognosis, current adaptation to disease and treatment status, and family's adaptation to disease and treatment status. Reports to be coping with great difficulty. Ms. Reynolds has been removed from the clinical trial due to her cancer metastasizing. She will begin a maintenance chemo which will prolong her life and she has a life expectance of 7-9 months. She has told her best friends, her daughter and her mother. She will be telling " her son tonight about her prognosis. She noted having good days and bad days since she received the news one week ago. She was provided with space to share her feelings including the unfairness of her prognosis, her anger and sadness. Ms. Reynolds expressed expected negative outcomes of the upcoming chemotherapy treatment. Her thoughts were gently reframed to acknowledge that she does not know how she will respond to the treatment (nausea). She noted a desire for her children to help her more around the house with chores. She was encouraged to ask her children for what she needs from them and to give her children the gift of being there for her emotionally.      Risk parameters:   Patient reports no suicidal ideation  Patient reports no homicidal ideation  Patient reports no self-injurious behavior  Patient reports no violent behavior   Safety needs:  None at this time      Verbal deficits: None     Patient's response to intervention:The patient's response to intervention is accepting.     Progress toward goals and other mental status changes:  The patient's progress toward goals is fair .      Progress to date:Progress - Ongoing, but Slow      Goals from last visit: Attempted, partially met        Patient Strengths: verbal, intelligent, successful, good social support, good insight, commitment to wellness, strong miah      Treatment Plan:individual psychotherapy  Target symptoms: depression, anxiety , anger  Why chosen therapy is appropriate versus another modality: patient responds to this modality  Outcome monitoring methods: self-report  Therapeutic intervention type: supportive psychotherapy  Prognosis: Fair      Behavioral goals:    Stress management: ask her children to help her more around the home and to be specific in her requests    Social engagement: allow her children, mother and friends to be there for her emotionally as she adjusts to her updated prognosis.     Return to clinic: 3 weeks     Length of  Service (minutes direct face-to-face contact): 45 minutes    Diagnosis:     ICD-10-CM ICD-9-CM   1. Adjustment disorder with mixed anxiety and depressed mood  F43.23 [phone]                     Myla Gallagher, PhD  Clinical Health Psychology Fellow

## 2024-01-18 NOTE — PROGRESS NOTES
"Gail Mckinnon MRN [id number]  PID# [id number]     Protocol: SWOG   IRB#: 2023.047  : NGUYEN Degroot MD  Treating Investigator: JESUS Santo MD     Randomized Phase II Trial of Encorafenib and Cetuximab with or Without Nivolumab (NSC #[id number]) for Patients with Previously Treated, Microsatellite Stable BRAF V600E Metastatic and/or Unresectable Colorectal Cancer      January 18th, 2024- Note to File       The patient was removed from study protocol on 11DEC2024 due to progression of disease.    This note to file concerns AEs listed in the 11DEC2024 CRC note.     Following additional review of the patients AEs and pathology report from 16JAN2024 Surgical/Medical Procedure-Grade 2 (Wide local Excision) for Neoplasms benign (Vulval Lesion)-Grade 2, the treating MD determined to remove both Grades of the Vulval Infection AEs listed on the patients AE log and noted within this CRCs 11DEC2024 note.     The treating MD deems the Grade 2 Neoplasm-Benign (Vulval Lesion) Diagnosed as, "LOBULAR CAPILLARY HEMANGIOMA (PYOGENIC GRANULOMA)" should replace the listed vulval infection AE, and as Probably related to the Corafenib.    The Vulval Infection entries on the patients AE log are lined through, signed, and dated by this CRC.    AEs in [facility]AVE are not due until February 10th, 2024, 30 days after final protocol treatment.     CHANDRIKA Adams RN  "

## 2024-01-19 NOTE — TELEPHONE ENCOUNTER
RD called patient d/t this RD receiving call from [facility] stating that the ONS that was on script is actually backordered. Medicaid also will not cover ONS unless it is at least 750 calories per day. [facility] said the option is to change to Boost Plus and up it to TID.    RD let patient know of above and she is okay with it. Discussed calorie, protein, and carb content of Boost Plus. Pt VU.     RD resent script per Ayse WATKINS.     Raquel Bullock 01/19/2024   10:16 AM

## 2024-01-22 NOTE — PROGRESS NOTES
Gail Mckinnon, MRN [id number]  PID# [id number]     Protocol: SWOG   IRB#: [id number]  : NGUYEN Degroot MD  Treating Investigator: JESUS Santo MD     Randomized Phase II Trial of Encorafenib and Cetuximab with or Without Nivolumab (NSC #[id number]) for Patients with Previously Treated, Microsatellite Stable BRAF V600E Metastatic and/or Unresectable Colorectal Cancer   Cycle 1 Day 15 Arm 1/ Nivolumab + Cetuximab + Encorafenib therapy.    January 22nd, 2024    The patient previously removed from study protocol on 11DEC2024 due to progression of disease.    The patient presented to the planned office visit accompanied by her mother and daughter. The patient was alert, oriented, without noted distress, with appropriate mood and affect for the situation, and freely agreed to continued participation in the surveillance and at-recurrence biospecimen part of the referenced study.  The patient was seen by the treating MD today for evaluation and review of non-protocol therapy of Lonsurf and Avastin.     ConMed list: reviewed with the patient for accuracy- see shadow chart.   Dr. Santo instructed patient that she can discontinue taking OTC magnesium. The pt. states she took last dose on 21JAN2024, and will not plan to take any additional doses.  The patient states she has not received oral non-protocol therapy Lonsurf.     Baseline AEs:  See shadow chart for full list of Baseline AEs.     Baseline Hypothyroidism- Grade 2 (Start date 2012-continues) per documentation by Dr. Moss, the hypothyroidism resulted from treatment for hyperthyroidism. .    Endocrinology following TSH for patient's baseline Grade II Hypothyroidism (resulted from treatment for Hyperthyroidism). See communication from study chair regarding TSH monitoring for this patient in Cycle 1 section of shadow chart. Additional source documentation in Cycle 4 Day 1 shadow chart and linked to 16NOV2023 CRC note.     Baseline Fatigue- Grade 1- continues- pt  reports being able to perform all ADLS. The treating MD is aware, does not deem as CS, no new orders given and continue to monitor.  Baseline Hypercalcemia-Grade 1 continues- corrected calcium calculation 11.00 mg/dL. Endocrinologist monitoring as related to the patients BASELINE Hyperparathyroidism. Per Dr. Moss's office, no treatment or intervention currently. The treating MD is aware does not deem CS, no change in Grade, no new orders, will continue to monitor.   Baseline Hyperparathyroidism- Grade 2 Continues (Start date 2021) The treating MD is aware, does not deem CS, no new orders given. Patient seen by endocrine on22NOV2023 & discussed with the treating MD; current calcium levels were not deemed CS, no new orders, will continue to monitor.     AEs:   See shadow chart for full list of AEs.  Per protocol, AE assessments will continue. Per LAST, since the patient has received her final treatment cycle, AEs will be entered day 30 following last dose of protocol treatment. (28JAN2024).     Rash-Maculo-papular Grade 1 Start date 8/28/2023- continues with improvement noted by the treating MD with today's examination. Pt. reports continued use of clindamycin gel and doxycycline as prescribed. Per the treating MD, the patient was instructed to discontinue Doxy and Clindamycin gel. The patient reports taking a dose this am and acknowledged she will not take any further doses.  The treating MD does not deem CS, and no new orders given.     Insomnia- Grade 2 (Start date 9/12/2023-continues) The treating MD deems as unlikely d/t to protocol therapy, and does not deem as CS, no new orders given. The patient denies taking any rx or OTC medication for sleep.      Vitamin D, 25 Hydroxy-Insufficiency ordered per endocrinologist; reported less than normal limits-endocrine following; No CTCAE grading found. The treating MD does not relate to therapy, does not deem as CS, no new orders given. Ergocalciferol prescribed and  patient admits to starting medication on 31OCT2023. Patient reports having two remaining tablets and will begin taking OTC Vitamin D3 - 2,000 IU daily as prescribed by endocrinologist. Currently next appointment with endocrinologist is scheduled for 15MAY2024.      Pain- Grade 1 (Start date ~01/01/2024-Continues)-Grade 1 Pt. Reports intermittent mild pain in the bladder area, no requiring pain meds. No noted blood in urine. The treating MD is aware, does not deem CS, nor related to protocol therapy. No new orders given.      Neoplasms benign, malignant, and unspecified- Vulvar Lesion- Grade 2 (Start date of approximately 11/28/2023-End date 16JAN2024 ) The patient first reported 01/02/2024 to clinic office staff via telephone. Pathology from the 16JAN2024 WLE resulted as LEFT LABIAL LESION, EXCISION: - LOBULAR CAPILLARY HEMANGIOMA (PYOGENIC GRANULOMA) The treating MD deemed the lesion as probably related to Encorafenib.    Tooth Infection-Grade 2 (Start date 01/16/2024-continues) Following the completion of initial prescription of Cefuroxime of 02JAN2024 through 12JAN2024, the patient called to report the gum area around the back tooth had started swelling again, and the treating MD ordered a new prescription for Cefuroxime for the patient to begin taking while awaiting visit with Dr. Greyson Ventura DDS. The patient reports the tooth around the swelling has been broken without dental evaluation since before starting initial non-protocol therapy. The treating MD does not relate this AE to protocol therapy, and no additional orders given. The patient admits to continued dosing of oral Cefuroxime and acknowledges 24JAN2024 appointment with Dr. Greyson Ventura DDS to evaluate current Grade 2 Tooth Infection.     Surgical and medical procedures - Other, specify GRADE 2- (Start date 01/16/2024- End date- 01/16/2024) Wide Local Excision Performed 1/16/2024 by Dr. Her ([facility]). Pathology resulted as benign. The patient  reports that she feels the area is healing, without concerns. The treating MD is aware.    Investigations-Other, Iron Deficiency Grade 2 (Start date 01/11/2024) Diagnosis of Iron Deficiency. The treating MD is aware, does not deem as CS, and deems attribution to protocol therapy as unlikely. The treating MD deems continued use of OTC Slow Fe+ as appropriate. No new orders for IV iron at this time.      /80 Pulse 102 Temp 97.7 °F (Temporal) Resp 20  Wt. 125.8 kg (277 lb 5.4oz) SpO2 96%   BSA 2.42 m² Pain Sc 5 (per intake documentation)    Amanda PS: One      This CRC reviewed upcoming appointments with the patient and offered the patient and family members support and encouragement. The patient denied having any additional questions and freely agreed to continue with protocol surveillance. The patient was discharged ambulatory without noted distress.     Protocol required bio-specimen (Whole Blood) due at progression of disease (Section 9.0 Study Calendar) collected at today's lab appointment per [facility] phlebotomist using two patient identifiers with this CRC present.  Patient consented to collection of Whole blood (cfDNA) at progression. Specimens collected and shipped per protocol 01 22JAN2024-see [employer] tracking and specimen tracking documents.    Physical and skin assessments to continue up to 6 months, per study calendar.  Per study calendar, assessments will continue to all acute AEs resolve.    Non-protocol therapy planned and consists of Lonsurf and Avastin.  Per early phase staff, the patient is on the waitlist for a slot for NTX study-see printed documentation behind this note.    CHANDRIKA Adams RN

## 2024-01-22 NOTE — PROGRESS NOTES
PROGRESS NOTE    Subjective:       Patient ID: Gail Mckinnon is a 53 y.o. female.  MRN: [id number]  : 1970    Chief Complaint: Metastatic colon cancer     History of Present Illness:   Gail Mckinnon is a 53 y.o. female who presents with colon cancer, initially stage III and now with LN recurrence.    On FOLIRI+ Avastin sine .     She was briefly switched to xeloda due to 5 FU shortage for a month. Did not tolerate Xeloda well due hand foot syndrome, blistering of feet, did not take the last day of Xeloda. Completed .     Resumed Folfiri + Avastin on 23. Atropine was held due to prior constipation.     Was admitted to the hospital from -23 for intractable nausea , vomiting and diarrhea as well as abdominal pain. No hematochezia or dark stool was noted.      US showed gallbladder stone and dilated CBD. She was managed conservatively. Had CT abd, pelvis, colonoscopy and MRCP that were relatively unremarkable without signs of cholecystitis. Cholecystectomy was not recommended.     She had recently resumed FOLFIRI and the symptoms started after the first cycle of resuming, never had issues prior to this.  Stayed in the hospital for 10 days.  C diff was negative. No other etiology was established. Symptoms improved over time and she was discharged on .     Interim history:  Enrolled in  SWOG 2107 (encorafenib/cetuximab and randomized to the Nivolumab arm),      Staging scans are done as per research protocol first completed on 10/9, consistent with response to treatment. Most recent repeat scans . Porgression vs pseudo progression.   We discussed her follow up scans, images reviewed at Tumor board, PET scan was recommended and completed. Four week interval scanning was recommended. Continue progression needed and she was taken off trial.      24  S/p excision of the vulvar mass, pathology consistent with pyoderma  gangrenosum. Has some local pain and itching.   She developed gum swelling again and was prescribed  cefuroxime again with improvement and some bleeding. Will be seeing the dentist on Wednesday.     Facial rash is improving, she will stop doxycycline.   Lonsurf will be delivered tomorrow.     Diagnosed with iron deficiency. Is on slow Fe. If iron remains low, can give IV venofer.   Can stop magnesium.     Has had intermittent abdominal pain. Pelvic pain, bladder pain is more bothersome.     Feeling down, saw her therapist recently. Will be seeing palliative care this week as well. Is on Wellbutrin 150 mg daily. Is also on Cymbalta 30 mg for joint pains but feels it is not helping.      Oncology History:  She completed adjuvant FOLFOX in November 2020. She tolerated only 4 cycles of FOLFOX before she developed an infusion reaction to oxaliplatin in cycle 5 was well as cycle 6. She then completed 6 cycles of infusional 5 FU.     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 She was offered second line therapy with FOLFIRI and Avastin.      She presented to the ED on 11/26 with left flank pain. CT renal stone study showed Moderate hydronephrosis on the left secondary to 3 mm calculus at the UPJ.    She had a PET scan mid April that showed possible progression of her disease with      She has been to [facility] for another opinion. No change was recommended in systemic therapy but she was offered surgical removal of the aorta caval nodes.  Recent sans at [facility]  show stable disease.      Surgery done 7/12/22. Received 2 more cycle of FOLFIRI without Avastin.     She had repeat imaging at [facility] on 9/24/22 that unfortunately showed disease progression since her surgery with multiple RP nodes and one mediastinal node.       PET 12/8/22  Impression:     1. Progression of disease with interval increase of abdominal and pelvic lymphadenopathy.  2. New area of moderate FDG uptake at the right half of the T9  vertebral body (image 124).  Favor degenerative disc changes.  No underlying osteolytic or osteoblastic lesion.  Recommend continued attention on follow-up.  3. No other evidence of FDG avid disease.     12/22/22  Impression After scan comparison:     1. Metastatic portacaval and left periaortic retroperitoneal lymph nodes which remain stable between the CT of 09/24/2022 and the subsequent PET-CT of 12/08/2022.  There is no evidence of progression of metastatic disease.  2. Nonobstructing bilateral renal calculi and cholelithiasis.    2/10/22:  CT chest abd pelvis  Impression:     Stable periportal, retroperitoneal and mesenteric lymphadenopathy compared to PET-CT 12/08/2022.     Stable right middle lobe 3 mm pulmonary nodule.  No new or enlarging pulmonary nodule.     Hepatic steatosis.  Hepatosplenomegaly.     Cholelithiasis.     Bilateral nonobstructing nephrolithiasis.     Small hiatal hernia with retained contrast in the distal esophagus.  Correlate for reflux.    She had virtual visit at [facility] on 2/17/23.     She has continued FOLFIRI and Avastin.     CT abd pelvis   5/7/23  Impression:     1. Mesenteric inflammatory changes have worsened since the prior study.  2. Mesenteric, retroperitoneal and left-sided pelvic enlarged lymph nodes are unchanged.     8/11/23  CT chest abd pelvis   Impression:     1. Patient with a history of colon adenocarcinoma with worsening periaortic, retroperitoneal, mesenteric, and iliac chain lymphadenopathy the in the abdomen and pelvis when compared with the previous study suggesting worsening metastatic disease.  2. Moderate left hydronephrosis and proximal left hydroureter to the level of possible retroperitoneal scarring.  Invasion/compression of the left ureter is not excluded.  3. Bilateral nephrolithiasis  4. Hepatomegaly and hepatic steatosis  5. Two tiny < 5 mm pulmonary nodules within the chest, unchanged.  No new pulmonary nodules.    10/9/23  CT chest abd pelvis      Impression:     Decreased size of retroperitoneal and mesenteric lymph nodes.  Stable left external iliac lymph node. Right upper lobe pulmonary nodule appears slightly increased in size.  No new pulmonary nodules.  Findings suggest mixed/partial response to therapy.     Cholelithiasis.     Hepatosplenomegaly.     Small pericardial effusion.     RECIST SUMMARY:     Date of prior examination for comparison: 08/11/2023     Lesion 1: Retroperitoneal soft tissue nodule but in the duodenum.  1.7 cm. Series 3 image 93.  Prior measurement 82.1 cm.     Lesion 2: Celiac axis lymph node.  1.5 cm. Series 3 image 56.  Prior measurement 1.8 cm.     Lesion 3: Portacaval lymph node.  1.0 cm. Series 3 image 59.  Prior measurement 1.2 cm.     Lesion 4: Mesenteric lymph node.  1.2 cm. Series 3 image 105.  Prior measurement 1.6 cm.     12/4/23 CT chest abd pelvis  Impression:     1. Stable and mildly enlarged mediastinal, retroperitoneal, mesenteric, and left pelvic lymph nodes, as well as interval development of new nodular soft tissue densities along the left pericolic gutter worrisome for tumoral deposits.  2. Interval slight increase in size several solid pulmonary nodules.  No new pulmonary nodule.  3. New small area of nodular wall thickening along the posterosuperior aspect of the urinary bladder, nonspecific and possibly related to adjacent fibrosis/scarring, with small tumoral deposit not excluded.  Consider correlation with cystoscopy.  4. Bilateral nonobstructive nephrolithiasis.  Mild left hydronephrosis.  5. Multiple additional findings, as noted above.  RECIST SUMMARY:     Date of prior examination for comparison: 10/09/2023     Lesion 1: Retroperitoneal soft tissue nodule abutting the duodenum.  1.3 cm. Series 102 image 155.  Prior measurement 1.3 cm.     Lesion 2: Celiac axis lymph node.  1.5 cm. Series 102 image 82.  Prior measurement 1.5 cm.     Lesion 3: Portacaval lymph node.  1.2 cm. Series 102 image 92.  Prior  measurement 1.2 cm.     Lesion 4: Mesenteric lymph node.  1.6 cm. Series 102 image 177.  Prior measurement 1.2 cm.    12/13/23  PET     Impression:     Progression of disease with multiple new and enlarging lymph nodes throughout the pelvis and mesentery along with new metastatic FDG avid mediastinal adenopathy as above.  Oncology History:  Oncology History   Malignant neoplasm of sigmoid colon   3/16/2020 Initial Diagnosis    Malignant neoplasm of sigmoid colon     3/31/2020 Cancer Staged    Staging form: Colon and Rectum, AJCC 8th Edition  - Clinical stage from 3/31/2020: Stage IIIC (cT4b, cN2a, cM0)     5/6/2020 - 11/17/2020 Chemotherapy    Treatment Summary   Plan Name: OP FOLFOX 6 Q2W  Treatment Goal: Curative  Status: Inactive  Start Date: 5/6/2020  End Date: 11/6/2020  Provider: Dylan Leyva MD  Chemotherapy: fluorouraciL injection 945 mg, 400 mg/m2 = 945 mg, Intravenous, Clinic/[facility] 1 time, 14 of 14 cycles  Administration: 945 mg (5/6/2020), 945 mg (5/20/2020), 945 mg (6/3/2020), 945 mg (6/17/2020), 945 mg (7/29/2020), 945 mg (8/12/2020), 945 mg (8/26/2020), 945 mg (9/9/2020), 945 mg (9/23/2020), 945 mg (10/6/2020), 945 mg (10/21/2020), 945 mg (11/4/2020)  fluorouraciL 2,400 mg/m2 = 5,665 mg in sodium chloride 0.9% 240 mL chemo infusion, 2,400 mg/m2 = 5,665 mg, Intravenous, Over 46 hours, 14 of 14 cycles  Administration: 5,665 mg (5/6/2020), 5,665 mg (5/20/2020), 5,665 mg (6/3/2020), 5,665 mg (6/17/2020), 5,665 mg (7/29/2020), 5,665 mg (8/12/2020), 5,665 mg (8/26/2020), 5,665 mg (9/9/2020), 5,665 mg (9/23/2020), 5,665 mg (10/6/2020), 5,665 mg (10/21/2020), 5,665 mg (11/4/2020)  leucovorin calcium 900 mg in dextrose 5 % 250 mL infusion, 945 mg, Intravenous, Clinic/[facility] 1 time, 14 of 14 cycles  Administration: 900 mg (5/6/2020), 900 mg (5/20/2020), 900 mg (6/3/2020), 900 mg (6/17/2020), 945 mg (6/30/2020), 945 mg (7/20/2020), 945 mg (7/29/2020), 945 mg (8/12/2020), 945 mg (8/26/2020), 945 mg (9/9/2020), 945  mg (9/23/2020), 945 mg (10/6/2020), 945 mg (10/21/2020), 945 mg (11/4/2020)  oxaliplatin (ELOXATIN) 200 mg in dextrose 5 % 500 mL chemo infusion, 201 mg, Intravenous, Clinic/[facility] 1 time, 6 of 6 cycles  Dose modification: 65 mg/m2 (original dose 85 mg/m2, Cycle 6)  Administration: 200 mg (5/6/2020), 200 mg (5/20/2020), 200 mg (6/3/2020), 200 mg (6/17/2020), 201 mg (6/30/2020), 150 mg (7/20/2020)     5/3/2021 Cancer Staged    Staging form: Colon and Rectum, AJCC 8th Edition  - Clinical stage from 5/3/2021: Stage Unknown (rcT0, cNX, cM1)     6/16/2021 - 8/2/2023 Chemotherapy    Treatment Summary   Plan Name: OP COLORECTAL FOLFIRI + BEVACIZUMAB Q2W  Treatment Goal: Control  Status: Inactive  Start Date: 6/16/2021  End Date: 8/2/2023  Provider: Marah aSnto MD  Chemotherapy: fluorouraciL injection 990 mg, 400 mg/m2 = 990 mg, Intravenous, [facility] 1 time, 15 of 15 cycles  Administration: 990 mg (6/16/2021), 990 mg (6/30/2021), 990 mg (7/14/2021), 980 mg (7/28/2021), 990 mg (8/11/2021), 990 mg (8/25/2021), 990 mg (9/8/2021), 990 mg (9/22/2021), 990 mg (10/6/2021), 990 mg (10/20/2021), 990 mg (11/3/2021), 990 mg (12/1/2021), 990 mg (12/15/2021), 990 mg (11/17/2021), 990 mg (12/28/2021)  fluorouraciL 2,400 mg/m2 = 5,950 mg in sodium chloride 0.9% 240 mL chemo infusion, 2,400 mg/m2 = 5,950 mg, Intravenous, Over 46 hours, 43 of 46 cycles  Administration: 5,950 mg (6/16/2021), 5,950 mg (6/30/2021), 5,950 mg (7/14/2021), 5,880 mg (7/28/2021), 5,930 mg (8/11/2021), 5,930 mg (8/25/2021), 5,930 mg (9/8/2021), 5,930 mg (9/22/2021), 5,930 mg (10/6/2021), 5,930 mg (10/20/2021), 5,930 mg (11/3/2021), 5,930 mg (12/1/2021), 5,930 mg (12/15/2021), 5,930 mg (11/17/2021), 5,930 mg (12/28/2021), 6,050 mg (5/11/2022), 6,025 mg (3/9/2022), 6,025 mg (2/23/2022), 5,930 mg (2/2/2022), 5,930 mg (1/19/2022), 6,050 mg (4/20/2022), 6,025 mg (4/6/2022), 6,025 mg (3/23/2022), 6,050 mg (6/1/2022), 6,050 mg (6/15/2022), 6,050 mg (10/19/2022), 6,050  mg (10/5/2022), 6,050 mg (11/2/2022), 6,050 mg (11/16/2022), 6,050 mg (11/30/2022), 6,050 mg (1/4/2023), 6,050 mg (12/19/2022), 6,050 mg (1/18/2023), 6,000 mg (5/22/2023), 6,000 mg (4/26/2023), 6,000 mg (4/11/2023), 6,000 mg (2/28/2023), 6,050 mg (2/14/2023), 6,000 mg (2/1/2023), 6,000 mg (7/5/2023), 6,000 mg (6/19/2023), 6,000 mg (6/5/2023), 6,000 mg (7/31/2023)  bevacizumab (AVASTIN) 600 mg in sodium chloride 0.9% 100 mL chemo infusion, 660 mg, Intravenous, [facility] 1 time, 36 of 36 cycles  Dose modification: 5 mg/kg (original dose 5 mg/kg, Cycle 26, Reason: MD Discretion, Comment: 5 mg/kg original dose)  Administration: 600 mg (6/30/2021), 600 mg (7/14/2021), 600 mg (7/28/2021), 600 mg (6/16/2021), 600 mg (8/11/2021), 655 mg (8/25/2021), 600 mg (9/8/2021), 600 mg (9/22/2021), 600 mg (10/6/2021), 600 mg (10/20/2021), 600 mg (11/3/2021), 655 mg (12/1/2021), 600 mg (12/15/2021), 600 mg (11/17/2021), 600 mg (12/28/2021), 600 mg (5/11/2022), 600 mg (3/9/2022), 600 mg (2/23/2022), 600 mg (2/2/2022), 600 mg (1/19/2022), 600 mg (4/20/2022), 680 mg (4/6/2022), 600 mg (3/23/2022), 680 mg (10/5/2022), 680 mg (10/19/2022), 680 mg (11/2/2022), 680 mg (11/16/2022), 680 mg (11/30/2022), 680 mg (1/4/2023), 680 mg (12/19/2022), 680 mg (1/18/2023), 680 mg (3/28/2023), 680 mg (3/14/2023), 680 mg (2/28/2023), 680 mg (2/14/2023), 680 mg (2/1/2023)  irinotecan (CAMPTOSAR) 440 mg in sodium chloride 0.9% 500 mL chemo infusion, 446 mg, Intravenous, Clinic/[facility] 1 time, 45 of 48 cycles  Dose modification: 180 mg/m2 (original dose 180 mg/m2, Cycle 24)  Administration: 440 mg (6/16/2021), 440 mg (6/30/2021), 440 mg (7/14/2021), 440 mg (7/28/2021), 440 mg (8/11/2021), 444 mg (8/25/2021), 440 mg (9/8/2021), 440 mg (9/22/2021), 440 mg (10/6/2021), 440 mg (10/20/2021), 440 mg (11/3/2021), 440 mg (12/1/2021), 440 mg (12/15/2021), 440 mg (11/17/2021), 440 mg (12/28/2021), 440 mg (5/11/2022), 440 mg (3/9/2022), 440 mg (2/23/2022), 440 mg  (2/2/2022), 440 mg (1/19/2022), 440 mg (4/20/2022), 440 mg (4/6/2022), 440 mg (3/24/2022), 440 mg (6/1/2022), 440 mg (6/15/2022), 440 mg (10/19/2022), 440 mg (10/5/2022), 440 mg (11/2/2022), 440 mg (11/16/2022), 440 mg (11/30/2022), 440 mg (1/4/2023), 440 mg (12/19/2022), 440 mg (1/18/2023), 440 mg (5/22/2023), 440 mg (4/26/2023), 440 mg (4/11/2023), 440 mg (3/28/2023), 440 mg (3/14/2023), 440 mg (2/28/2023), 440 mg (2/14/2023), 440 mg (2/1/2023), 440 mg (7/5/2023), 440 mg (6/19/2023), 440 mg (6/5/2023), 440 mg (7/31/2023)  bevacizumab-awwb (MVASI) 5 mg/kg = 680 mg in sodium chloride 0.9% 100 mL infusion, 5 mg/kg = 680 mg (100 % of original dose 5 mg/kg), Intravenous, Clinic/[facility] 1 time, 7 of 10 cycles  Dose modification: 5 mg/kg (original dose 5 mg/kg, Cycle 39)  Administration: 680 mg (4/11/2023), 680 mg (4/26/2023), 680 mg (5/22/2023), 680 mg (7/5/2023), 680 mg (6/19/2023), 680 mg (6/5/2023), 680 mg (7/31/2023)     8/17/2023 - 8/18/2023 Chemotherapy    Treatment Summary   Plan Name: OP CETUXIMAB 500MG Q2W  Treatment Goal: Control  Status: Inactive  Start Date:   End Date:   Provider: Marah Santo MD  Chemotherapy: [No matching medication found in this treatment plan]     8/24/2023 - 12/29/2023 Chemotherapy    Treatment Summary   Plan Name: [facility] - ARM 1 ENCORAFENIB  CETUXIMAB NIVOLUMAB  Treatment Goal: Palliative  Status: Inactive  Start Date: 8/24/2023  End Date: 12/29/2023  Provider: Marah Santo MD  Chemotherapy: cetuximab (ERBITUX) 100 mg/50 mL chemo infusion 1,200 mg, 1,230 mg, Intravenous, Clinic/[facility] 1 time, 5 of 12 cycles  Administration: 1,200 mg (8/24/2023), 1,200 mg (9/8/2023), 1,200 mg (9/22/2023), 1,200 mg (10/6/2023), 1,200 mg (10/20/2023), 1,200 mg (11/3/2023), 1,200 mg (11/17/2023), 1,200 mg (12/1/2023), 1,200 mg (12/15/2023), 1,200 mg (12/29/2023)  encorafenib 75 mg Cap, 300 mg, Oral, Daily, 1 of 1 cycle, Start date: --, End date: --     1/16/2024 -  Chemotherapy    Treatment Summary    Plan Name: OP BEVACIZUMAB Q4W  Treatment Goal: Control  Status: Active  Start Date: 1/16/2024 (Planned)  End Date: 12/3/2024 (Planned)  Provider: Marah Santo MD  Chemotherapy: bevacizumab-awwb (MVASI) 5 mg/kg = 625 mg in sodium chloride 0.9% 100 mL infusion, 5 mg/kg = 625 mg (original dose ), Intravenous, Clinic/[facility] 1 time, 0 of 12 cycles  Dose modification: 5 mg/kg (Cycle 1, Reason: Other (see comments), Comment: Given with Lonsurf)     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   4/3/2020 Initial Diagnosis    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     5/6/2020 - 11/17/2020 Chemotherapy    Treatment Summary   Plan Name: OP FOLFOX 6 Q2W  Treatment Goal: Curative  Status: Inactive  Start Date: 5/6/2020  End Date: 11/6/2020  Provider: Dylan Leyva MD  Chemotherapy: fluorouraciL injection 945 mg, 400 mg/m2 = 945 mg, Intravenous, Clinic/[facility] 1 time, 14 of 14 cycles  Administration: 945 mg (5/6/2020), 945 mg (5/20/2020), 945 mg (6/3/2020), 945 mg (6/17/2020), 945 mg (7/29/2020), 945 mg (8/12/2020), 945 mg (8/26/2020), 945 mg (9/9/2020), 945 mg (9/23/2020), 945 mg (10/6/2020), 945 mg (10/21/2020), 945 mg (11/4/2020)  fluorouraciL 2,400 mg/m2 = 5,665 mg in sodium chloride 0.9% 240 mL chemo infusion, 2,400 mg/m2 = 5,665 mg, Intravenous, Over 46 hours, 14 of 14 cycles  Administration: 5,665 mg (5/6/2020), 5,665 mg (5/20/2020), 5,665 mg (6/3/2020), 5,665 mg (6/17/2020), 5,665 mg (7/29/2020), 5,665 mg (8/12/2020), 5,665 mg (8/26/2020), 5,665 mg (9/9/2020), 5,665 mg (9/23/2020), 5,665 mg (10/6/2020), 5,665 mg (10/21/2020), 5,665 mg (11/4/2020)  leucovorin calcium 900 mg in dextrose 5 % 250 mL infusion, 945 mg, Intravenous, Clinic/[facility] 1 time, 14 of 14 cycles  Administration: 900 mg (5/6/2020), 900 mg (5/20/2020), 900 mg (6/3/2020), 900 mg (6/17/2020), 945 mg (6/30/2020), 945 mg (7/20/2020), 945 mg (7/29/2020), 945 mg (8/12/2020), 945 mg (8/26/2020), 945 mg (9/9/2020), 945 mg (9/23/2020), 945 mg (10/6/2020), 945 mg (10/21/2020),  945 mg (11/4/2020)  oxaliplatin (ELOXATIN) 200 mg in dextrose 5 % 500 mL chemo infusion, 201 mg, Intravenous, Clinic/[facility] 1 time, 6 of 6 cycles  Dose modification: 65 mg/m2 (original dose 85 mg/m2, Cycle 6)  Administration: 200 mg (5/6/2020), 200 mg (5/20/2020), 200 mg (6/3/2020), 200 mg (6/17/2020), 201 mg (6/30/2020), 150 mg (7/20/2020)     6/16/2021 - 8/2/2023 Chemotherapy    Treatment Summary   Plan Name: OP COLORECTAL FOLFIRI + BEVACIZUMAB Q2W  Treatment Goal: Control  Status: Inactive  Start Date: 6/16/2021  End Date: 8/2/2023  Provider: Marah Santo MD  Chemotherapy: fluorouraciL injection 990 mg, 400 mg/m2 = 990 mg, Intravenous, Clinic/[facility] 1 time, 15 of 15 cycles  Administration: 990 mg (6/16/2021), 990 mg (6/30/2021), 990 mg (7/14/2021), 980 mg (7/28/2021), 990 mg (8/11/2021), 990 mg (8/25/2021), 990 mg (9/8/2021), 990 mg (9/22/2021), 990 mg (10/6/2021), 990 mg (10/20/2021), 990 mg (11/3/2021), 990 mg (12/1/2021), 990 mg (12/15/2021), 990 mg (11/17/2021), 990 mg (12/28/2021)  fluorouraciL 2,400 mg/m2 = 5,950 mg in sodium chloride 0.9% 240 mL chemo infusion, 2,400 mg/m2 = 5,950 mg, Intravenous, Over 46 hours, 43 of 46 cycles  Administration: 5,950 mg (6/16/2021), 5,950 mg (6/30/2021), 5,950 mg (7/14/2021), 5,880 mg (7/28/2021), 5,930 mg (8/11/2021), 5,930 mg (8/25/2021), 5,930 mg (9/8/2021), 5,930 mg (9/22/2021), 5,930 mg (10/6/2021), 5,930 mg (10/20/2021), 5,930 mg (11/3/2021), 5,930 mg (12/1/2021), 5,930 mg (12/15/2021), 5,930 mg (11/17/2021), 5,930 mg (12/28/2021), 6,050 mg (5/11/2022), 6,025 mg (3/9/2022), 6,025 mg (2/23/2022), 5,930 mg (2/2/2022), 5,930 mg (1/19/2022), 6,050 mg (4/20/2022), 6,025 mg (4/6/2022), 6,025 mg (3/23/2022), 6,050 mg (6/1/2022), 6,050 mg (6/15/2022), 6,050 mg (10/19/2022), 6,050 mg (10/5/2022), 6,050 mg (11/2/2022), 6,050 mg (11/16/2022), 6,050 mg (11/30/2022), 6,050 mg (1/4/2023), 6,050 mg (12/19/2022), 6,050 mg (1/18/2023), 6,000 mg (5/22/2023), 6,000 mg (4/26/2023), 6,000  mg (4/11/2023), 6,000 mg (2/28/2023), 6,050 mg (2/14/2023), 6,000 mg (2/1/2023), 6,000 mg (7/5/2023), 6,000 mg (6/19/2023), 6,000 mg (6/5/2023), 6,000 mg (7/31/2023)  bevacizumab (AVASTIN) 600 mg in sodium chloride 0.9% 100 mL chemo infusion, 660 mg, Intravenous, [facility] 1 time, 36 of 36 cycles  Dose modification: 5 mg/kg (original dose 5 mg/kg, Cycle 26, Reason: MD Discretion, Comment: 5 mg/kg original dose)  Administration: 600 mg (6/30/2021), 600 mg (7/14/2021), 600 mg (7/28/2021), 600 mg (6/16/2021), 600 mg (8/11/2021), 655 mg (8/25/2021), 600 mg (9/8/2021), 600 mg (9/22/2021), 600 mg (10/6/2021), 600 mg (10/20/2021), 600 mg (11/3/2021), 655 mg (12/1/2021), 600 mg (12/15/2021), 600 mg (11/17/2021), 600 mg (12/28/2021), 600 mg (5/11/2022), 600 mg (3/9/2022), 600 mg (2/23/2022), 600 mg (2/2/2022), 600 mg (1/19/2022), 600 mg (4/20/2022), 680 mg (4/6/2022), 600 mg (3/23/2022), 680 mg (10/5/2022), 680 mg (10/19/2022), 680 mg (11/2/2022), 680 mg (11/16/2022), 680 mg (11/30/2022), 680 mg (1/4/2023), 680 mg (12/19/2022), 680 mg (1/18/2023), 680 mg (3/28/2023), 680 mg (3/14/2023), 680 mg (2/28/2023), 680 mg (2/14/2023), 680 mg (2/1/2023)  irinotecan (CAMPTOSAR) 440 mg in sodium chloride 0.9% 500 mL chemo infusion, 446 mg, Intravenous, Clinic/[facility] 1 time, 45 of 48 cycles  Dose modification: 180 mg/m2 (original dose 180 mg/m2, Cycle 24)  Administration: 440 mg (6/16/2021), 440 mg (6/30/2021), 440 mg (7/14/2021), 440 mg (7/28/2021), 440 mg (8/11/2021), 444 mg (8/25/2021), 440 mg (9/8/2021), 440 mg (9/22/2021), 440 mg (10/6/2021), 440 mg (10/20/2021), 440 mg (11/3/2021), 440 mg (12/1/2021), 440 mg (12/15/2021), 440 mg (11/17/2021), 440 mg (12/28/2021), 440 mg (5/11/2022), 440 mg (3/9/2022), 440 mg (2/23/2022), 440 mg (2/2/2022), 440 mg (1/19/2022), 440 mg (4/20/2022), 440 mg (4/6/2022), 440 mg (3/24/2022), 440 mg (6/1/2022), 440 mg (6/15/2022), 440 mg (10/19/2022), 440 mg (10/5/2022), 440 mg (11/2/2022), 440 mg  (11/16/2022), 440 mg (11/30/2022), 440 mg (1/4/2023), 440 mg (12/19/2022), 440 mg (1/18/2023), 440 mg (5/22/2023), 440 mg (4/26/2023), 440 mg (4/11/2023), 440 mg (3/28/2023), 440 mg (3/14/2023), 440 mg (2/28/2023), 440 mg (2/14/2023), 440 mg (2/1/2023), 440 mg (7/5/2023), 440 mg (6/19/2023), 440 mg (6/5/2023), 440 mg (7/31/2023)  bevacizumab-awwb (MVASI) 5 mg/kg = 680 mg in sodium chloride 0.9% 100 mL infusion, 5 mg/kg = 680 mg (100 % of original dose 5 mg/kg), Intravenous, Clinic/[facility] 1 time, 7 of 10 cycles  Dose modification: 5 mg/kg (original dose 5 mg/kg, Cycle 39)  Administration: 680 mg (4/11/2023), 680 mg (4/26/2023), 680 mg (5/22/2023), 680 mg (7/5/2023), 680 mg (6/19/2023), 680 mg (6/5/2023), 680 mg (7/31/2023)     8/17/2023 - 8/18/2023 Chemotherapy    Treatment Summary   Plan Name: OP CETUXIMAB 500MG Q2W  Treatment Goal: Control  Status: Inactive  Start Date:   End Date:   Provider: Marah Santo MD  Chemotherapy: [No matching medication found in this treatment plan]     8/24/2023 - 12/29/2023 Chemotherapy    Treatment Summary   Plan Name: [facility] - ARM 1 ENCORAFENIB  CETUXIMAB NIVOLUMAB  Treatment Goal: Palliative  Status: Inactive  Start Date: 8/24/2023  End Date: 12/29/2023  Provider: Marah Santo MD  Chemotherapy: cetuximab (ERBITUX) 100 mg/50 mL chemo infusion 1,200 mg, 1,230 mg, Intravenous, Clinic/HOD 1 time, 5 of 12 cycles  Administration: 1,200 mg (8/24/2023), 1,200 mg (9/8/2023), 1,200 mg (9/22/2023), 1,200 mg (10/6/2023), 1,200 mg (10/20/2023), 1,200 mg (11/3/2023), 1,200 mg (11/17/2023), 1,200 mg (12/1/2023), 1,200 mg (12/15/2023), 1,200 mg (12/29/2023)  encorafenib 75 mg Cap, 300 mg, Oral, Daily, 1 of 1 cycle, Start date: --, End date: --     1/16/2024 -  Chemotherapy    Treatment Summary   Plan Name: OP BEVACIZUMAB Q4W  Treatment Goal: Control  Status: Active  Start Date: 1/16/2024 (Planned)  End Date: 12/3/2024 (Planned)  Provider: Marah Santo MD  Chemotherapy: bevacizumab-awwb  (MVASI) 5 mg/kg = 625 mg in sodium chloride 0.9% 100 mL infusion, 5 mg/kg = 625 mg (original dose ), Intravenous, Clinic/HOD 1 time, 0 of 12 cycles  Dose modification: 5 mg/kg (Cycle 1, Reason: Other (see comments), Comment: Given with [employer])         History:  Past Medical History:   Diagnosis Date    Anxiety     Depression     FH: ovarian cancer 2020    Hx of psychiatric care     Effexor, Paxil, Lexapro, Zoloft, Wellbutrin, Trazodone Buspar    Hypertension     Hyperthyroidism     Hypothyroid     Kidney calculi     Malignant neoplasm of sigmoid colon 2020    Menorrhagia     Multinodular goiter 2012    Palpitation     Psychiatric problem     Venous insufficiency     Vulvar lesion       Past Surgical History:   Procedure Laterality Date     SECTION, CLASSIC      x3    COLONOSCOPY N/A 2020    Procedure: COLONOSCOPY;  Surgeon: Shane Parker MD;  Location: [facility];  Service: Endoscopy;  Laterality: N/A;    COLONOSCOPY N/A 2022    Procedure: COLONOSCOPY;  Surgeon: Shane Parker MD;  Location: [facility];  Service: Endoscopy;  Laterality: N/A;    COLONOSCOPY N/A 2023    Procedure: COLONOSCOPY;  Surgeon: Shane Parker MD;  Location: [facility];  Service: Endoscopy;  Laterality: N/A;  no cecum anastomosis    CYSTOSCOPY W/ URETERAL STENT PLACEMENT Bilateral 2020    Procedure: CYSTOSCOPY, WITH URETERAL STENT INSERTION;  Surgeon: Claudio Tyson MD;  Location: [address];  Service: Urology;  Laterality: Bilateral;    EXAMINATION UNDER ANESTHESIA N/A 2024    Procedure: Exam under anesthesia-vagina;  Surgeon: Eli Her MD;  Location: [facility] OR;  Service: OB/GYN;  Laterality: N/A;    EXCISION-WIDE LOCAL Left 2024    Procedure: EXCISION-WIDE LOCAL -  Labia Majora;  Surgeon: Eli Her MD;  Location: [facility] OR;  Service: OB/GYN;  Laterality: Left;  upper left side of labia majora    EXCISIONAL BIOPSY, LYMPH NODE  2022    abdominal x 4     INSERTION OF TUNNELED CENTRAL VENOUS CATHETER (CVC) WITH SUBCUTANEOUS PORT N/A 05/01/2020    Procedure: [id number];  Surgeon: Stephen Diagnostic Provider;  Location: [facility] OR 2ND FLR;  Service: Radiology;  Laterality: N/A;  Room 189/[facility]    LAPAROSCOPIC SIGMOIDECTOMY N/A 03/25/2020    Procedure: COLECTOMY, SIGMOID, LAPAROSCOPIC, flex sig, ERAS high;  Surgeon: Silvio Man MD;  Location: [facility] OR 2ND FLR;  Service: Colon and Rectal;  Laterality: N/A;    MOBILIZATION OF SPLENIC FLEXURE  03/25/2020    Procedure: MOBILIZATION, SPLENIC FLEXURE;  Surgeon: Silvio Man MD;  Location: [facility] OR 2ND FLR;  Service: Colon and Rectal;;    TONSILLECTOMY      TOTAL ABDOMINAL HYSTERECTOMY W/ BILATERAL SALPINGOOPHORECTOMY N/A 03/25/2020    Procedure: HYSTERECTOMY, TOTAL, ABDOMINAL, WITH BILATERAL SALPINGO-OOPHORECTOMY;  Surgeon: Keron Brady MD;  Location: [facility] OR 2ND FLR;  Service: Oncology;  Laterality: N/A;     Family History   Problem Relation Age of Onset    Heart disease Father     Diabetes Mother 65    Drug abuse Brother     Drug abuse Brother     Cancer Maternal Aunt         lung cancer    Cancer Maternal Grandmother         stomach cancer- started in ovaries    Ovarian cancer Maternal Grandmother         stomach cancer- started in ovaries    Colon cancer Paternal Uncle     Cancer Paternal Uncle     Ovarian cancer Maternal Aunt     Ovarian cancer Maternal Aunt     Ovarian cancer Cousin         mother had ovarian cancer    Drug abuse Son     Drug abuse Son         clean/sober since 2012    Colon cancer Son     No Known Problems Daughter     Uterine cancer Neg Hx     Breast cancer Neg Hx      Social History     Tobacco Use    Smoking status: Never    Smokeless tobacco: Never   Substance and Sexual Activity    Alcohol use: Yes     Comment: occasionally- twice monthly    Drug use: Never    Sexual activity: Yes     Partners: Male     Birth control/protection: See Surgical Hx        ROS:   Review of  "Systems   Constitutional:  Positive for malaise/fatigue. Negative for fever and weight loss.   HENT:  Negative for congestion, hearing loss, nosebleeds and sore throat.    Eyes:  Negative for double vision and photophobia.   Respiratory:  Negative for cough, hemoptysis, sputum production, shortness of breath and wheezing.    Cardiovascular:  Negative for chest pain, palpitations, orthopnea and leg swelling.   Gastrointestinal:  Positive for abdominal pain. Negative for blood in stool, constipation, diarrhea, heartburn and vomiting.   Genitourinary:  Positive for frequency and urgency. Negative for dysuria and hematuria.   Musculoskeletal:  Negative for back pain, joint pain and myalgias.   Skin:  Positive for rash. Negative for itching.   Neurological:  Negative for dizziness, tingling, seizures, weakness and headaches.   Endo/Heme/Allergies:  Negative for polydipsia. Does not bruise/bleed easily.   Psychiatric/Behavioral:  Negative for depression and memory loss. The patient is nervous/anxious and has insomnia (improving).         Objective:     Vitals:    01/22/24 1100   BP: 133/80   Pulse: 102   Resp: 20   Temp: 97.7 °F (36.5 °C)   TempSrc: Temporal   SpO2: 96%   Weight: 125.8 kg (277 lb 5.4 oz)   Height: 5' 6" (1.676 m)   PainSc:   5   PainLoc: Generalized         Wt Readings from Last 10 Encounters:   01/22/24 125.8 kg (277 lb 5.4 oz)   01/15/24 124.8 kg (275 lb 2.2 oz)   01/11/24 125.3 kg (276 lb 3.8 oz)   01/09/24 127.7 kg (281 lb 8.4 oz)   01/02/24 128.7 kg (283 lb 11.7 oz)   12/29/23 129.5 kg (285 lb 7.9 oz)   12/29/23 129.5 kg (285 lb 7.9 oz)   12/15/23 128.7 kg (283 lb 11.7 oz)   12/14/23 128.7 kg (283 lb 11.7 oz)   12/01/23 129.9 kg (286 lb 6 oz)       Physical Examination:   Physical Exam  Vitals and nursing note reviewed.   Constitutional:       General: She is not in acute distress.     Appearance: She is not diaphoretic.   HENT:      Head: Normocephalic.      Mouth/Throat:      Pharynx: No " oropharyngeal exudate.   Eyes:      General: No scleral icterus.     Conjunctiva/sclera: Conjunctivae normal.   Neck:      Thyroid: No thyromegaly.   Cardiovascular:      Rate and Rhythm: Normal rate and regular rhythm.      Heart sounds: Normal heart sounds. No murmur heard.  Pulmonary:      Effort: Pulmonary effort is normal. No respiratory distress.      Breath sounds: No stridor. No wheezing or rales.   Chest:      Chest wall: No tenderness.   Abdominal:      General: Bowel sounds are normal. There is no distension.      Palpations: Abdomen is soft. There is no mass.      Tenderness: There is no abdominal tenderness. There is no rebound.   Musculoskeletal:         General: No tenderness or deformity. Normal range of motion.      Cervical back: Neck supple.   Lymphadenopathy:      Cervical: No cervical adenopathy.   Skin:     General: Skin is warm and dry.      Findings: Rash (grade 1 facial rash, at baseline) present. No erythema.   Neurological:      Mental Status: She is alert and oriented to person, place, and time.      Cranial Nerves: No cranial nerve deficit.      Coordination: Coordination normal.      Gait: Gait is intact.   Psychiatric:         Mood and Affect: Affect normal.         Cognition and Memory: Memory normal.         Judgment: Judgment normal.          Diagnostic Tests:  Significant Imaging: I have reviewed and interpreted all pertinent imaging results/findings.  Laboratory Data:  All pertinent labs have been reviewed.    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 Oxaliplatin was stopped due to severe adverse reaction.     Follow-up CTs and PET in May 2021 showed enlarging retroperitoneal node, concerning for metastatic disease.      She started FOLFIRI + Avastin and has continued till July 2022.   Given isolated RP toni disease, she has undergone open Left periaortic and aortocaval lymph node dissection on 7/12/2022 at [facility]. Resumed FOLFIRI+ Debo with relatively stable disease but now has disease progression on scans in August 2023.     She has been enrolled into  SWOG 2107 (encorafenib/cetuximab + nivo) , initial scans showed improvement, second scans showed some new lesions, now growing after a short term repeat.  As per Recist calculations, the target lesions are stable. However, there is  POD in the left paracolic gutter, seen on PET scan as well. Will take her off of trial at this time.     Will initiate next line of SOC treatment with Lonsurf and Avastin. Hold avastin if any invasive dental procedures are recommended.   Look for early phase trials, will follow up with PTCP team.  Will also look into options at [facility]. She has seen Dr. Montelongo previously and I have discussed the plan with him and he concurs.     Dental abscess   Will be seeing Dr. Ventura for assessment, will follow recommendations.     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     Grade 1 dermatitis   Improving, stop doxycycline.     Neoplasm pain  Monitor.  Follow up with palliative care.     Hydronephrosis   Follow up with Urology for co management. No acute intervention was recommended.     Nausea   Continue compazine as needed, improving.     Constipation  Start sennakot and daily Miralax.      Transaminitis  Fluctuates.  Continue to monitor. No obvious liver mets.     Hypercalcemia   Mild, secondary to hyperparathyroidism.  Avoid calcium supplements. Continue Endocrine follow up.     Hypothyroidism  Multinodular goiter   Continue Levothyroxine. Recent dose adjustment noted.   Had a non diagnostic biopsy in 2012, usg findings have remained stable. Reviewed repeat thyroid US, stable, will follow up with Endocrine.     Essential HTN   Continue antihypertensives.      Anxiety   Continue to  follow up with Onc psych.    Vaginal candidosis   Responded to fluconazole, monitor.    MDM includes  :    - Acute or chronic illness or injury that poses a threat to life or bodily function  - Independent review and explanation of 3+ results from unique tests  - Discussion of management and ordering 3+ unique tests  - Extensive discussion of treatment and management  - Prescription drug management  - Drug therapy requiring intensive monitoring for toxicity          ECOG SCORE               Discussion:   No follow-ups on file.    Plan was discussed with the patient at length, and she verbalized understanding. Gail was given an opportunity to ask questions that were answered to her satisfaction, and she was advised to call in the interval if any problems or questions arise.    Electronically signed by Marah Santo MD        Route Chart for Scheduling    Med Onc Chart Routing      Follow up with physician . 2/12   Follow up with [facility]    Infusion scheduling note   1/29 for avastin and then 2/12 and then every 2 weeks   Injection scheduling note    Labs CBC, CMP and magnesium   Scheduling:  Preferred lab:  Lab interval:  2/12   Imaging    Pharmacy appointment    Other referrals                  Treatment Plan Information   OP BEVACIZUMAB Q4W   Marah Santo MD   Upcoming Treatment Dates - OP BEVACIZUMAB Q4W    1/16/2024       Chemotherapy       bevacizumab-awwb (MVASI) 5 mg/kg = 625 mg in sodium chloride 0.9% 100 mL  infusion  1/30/2024       Chemotherapy       bevacizumab-awwb (MVASI) 5 mg/kg = 625 mg in sodium chloride 0.9% 100 mL infusion  2/13/2024       Chemotherapy       bevacizumab-awwb (MVASI) 5 mg/kg = 625 mg in sodium chloride 0.9% 100 mL infusion  2/27/2024       Chemotherapy       bevacizumab-awwb (MVASI) 5 mg/kg = 625 mg in sodium chloride 0.9% 100 mL infusion    Supportive Plan Information  IV FLUIDS AND ELECTROLYTES   Brayden Solo MD   Upcoming Treatment Dates - IV FLUIDS AND ELECTROLYTES    No upcoming days in selected categories.    Therapy Plan Information  PORT FLUSH  Flushes  heparin, porcine (PF) 100 unit/mL injection flush 500 Units  500 Units, Intravenous, Every visit  sodium chloride 0.9% flush 10 mL  10 mL, Intravenous, Every visit    VENOFER (IRON SUCROSE) QW  Medications  iron sucrose injection 100 mg  100 mg, Intravenous, 1 time a week  Anaphylaxis/Hypersensitivity  EPINEPHrine (EPIPEN) 0.3 mg/0.3 mL pen injection 0.3 mg  0.3 mg, Intramuscular, PRN  diphenhydrAMINE injection 50 mg  50 mg, Intravenous, PRN  hydrocortisone sodium succinate injection 100 mg  100 mg, Intravenous, PRN  Flushes  sodium chloride 0.9% 250 mL flush bag  Intravenous, 1 time a week  sodium chloride 0.9% flush 10 mL  10 mL, Intravenous, 1 time a week  heparin, porcine (PF) 100 unit/mL injection flush 500 Units  500 Units, Intravenous, 1 time a week  alteplase injection 2 mg  2 mg, Intra-Catheter, 1 time a week

## 2024-01-24 NOTE — PROGRESS NOTES
Ms. Mckinnon reports fractured teeth and a history of swelling on her lower right quadrant. I recommended extractions #[id number] due to unrestorable  teeth. She will schedule with Dr. Jeremy hong and is currently taking antibiotics.  She will return in three weeks to evaluate healing and to determine a comprehensive treatment plan.

## 2024-01-25 NOTE — PROGRESS NOTES
The patient location is: [place]  The chief complaint leading to consultation is: pain and symptom management    Visit type: audiovisual    Face to Face time with patient: 20  30 minutes of total time spent on the encounter, which includes face to face time and non-face to face time preparing to see the patient (eg, review of tests), Obtaining and/or reviewing separately obtained history, Documenting clinical information in the electronic or other health record, Independently interpreting results (not separately reported) and communicating results to the patient/family/caregiver, or Care coordination (not separately reported).         Each patient to whom he or she provides medical services by telemedicine is:  (1) informed of the relationship between the physician and patient and the respective role of any other health care provider with respect to management of the patient; and (2) notified that he or she may decline to receive medical services by telemedicine and may withdraw from such care at any time.    Notes:           Office Visit  [facility]      ASSESSMENT/PLAN:     Plan/Recommendations:  Diagnoses and all orders for this visit:    Cancer related pain  Malignant neoplasm of sigmoid colon  -     oxyCODONE-acetaminophen (PERCOCET)  mg per tablet; Take 1 tablet by mouth every 4 (four) hours as needed for Pain.  -     senna (SENOKOT) 8.6 mg tablet; Take 2 tablets by mouth once daily.  -     ibuprofen (ADVIL,MOTRIN) 600 MG tablet; Take 1 tablet (600 mg total) by mouth every 8 (eight) hours as needed for Pain- using sparingly             Continue to follow with Oncology    Palliative care by specialist  Low mood due to disease progression  Denies SI/HI  She is engaged with psych    Follow up: 1 month    SUBJECTIVE:     History of Present Illness:  Patient is a 53 y.o. 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 Referred by Dr. Santo.      Patient presents for routine follow up . She is patient of Dr Hill , she reports since last seen she has stopped clinical trial as it has not been working, recent  scans showed disease progression. Emotional support provided. There are new areas in her bladder and pelvic area. Patient reports she is starting a new medicine on Monday, she was told with this medicine her prognosis is about 1 year. Her family is aware and it has been difficult to accept. Encouraged to engage with Psych for support    She continues to have abdominal pain and is rotating between Ibuprofen and Percocet. Side effects of constipation- lactulose has helped.      Appetite and sleep stable    Past Medical History:   Diagnosis Date    Anxiety     Depression     FH: ovarian cancer 2020    Hx of psychiatric care     Effexor, Paxil, Lexapro, Zoloft, Wellbutrin, Trazodone Buspar    Hypertension     Hyperthyroidism     Hypothyroid     Kidney calculi     Malignant neoplasm of sigmoid colon 2020    Menorrhagia     Multinodular goiter 2012    Palpitation     Psychiatric problem     Venous insufficiency     Vulvar lesion      Past Surgical History:   Procedure Laterality Date     SECTION, CLASSIC      x3    COLONOSCOPY N/A 2020    Procedure: COLONOSCOPY;  Surgeon: Shane Parker MD;  Location: [facility];  Service: Endoscopy;  Laterality: N/A;    COLONOSCOPY N/A 2022    Procedure: COLONOSCOPY;  Surgeon: Shane Parker MD;  Location: [facility];  Service: Endoscopy;  Laterality: N/A;    COLONOSCOPY N/A 2023    Procedure: COLONOSCOPY;  Surgeon: Shane Parker MD;  Location: [facility];  Service: Endoscopy;  Laterality: N/A;  no cecum anastomosis    CYSTOSCOPY W/ URETERAL STENT PLACEMENT Bilateral 2020    Procedure: CYSTOSCOPY, WITH URETERAL STENT INSERTION;  Surgeon: Claudio Tyson MD;  Location: [address]  [facility];  Service: Urology;  Laterality: Bilateral;    EXAMINATION UNDER ANESTHESIA N/A 1/16/2024    Procedure: Exam under anesthesia-vagina;  Surgeon: Eli Her MD;  Location: [facility] OR;  Service: OB/GYN;  Laterality: N/A;    EXCISION-WIDE LOCAL Left 1/16/2024    Procedure: EXCISION-WIDE LOCAL -  Labia Majora;  Surgeon: Eli Her MD;  Location: [facility] OR;  Service: OB/GYN;  Laterality: Left;  upper left side of labia majora    EXCISIONAL BIOPSY, LYMPH NODE  07/2022    abdominal x 4    INSERTION OF TUNNELED CENTRAL VENOUS CATHETER (CVC) WITH SUBCUTANEOUS PORT N/A 05/01/2020    Procedure: VODANIBCD-KCCN-G-CATH;  Surgeon: Doscaprice Diagnostic Provider;  Location: [facility] OR 2ND FLR;  Service: Radiology;  Laterality: N/A;  Room 189/[facility]    LAPAROSCOPIC SIGMOIDECTOMY N/A 03/25/2020    Procedure: COLECTOMY, SIGMOID, LAPAROSCOPIC, flex sig, ERAS high;  Surgeon: Silvio Man MD;  Location: [facility] OR 2ND FLR;  Service: Colon and Rectal;  Laterality: N/A;    MOBILIZATION OF SPLENIC FLEXURE  03/25/2020    Procedure: MOBILIZATION, SPLENIC FLEXURE;  Surgeon: Silvio Man MD;  Location: [facility] OR 2ND FLR;  Service: Colon and Rectal;;    TONSILLECTOMY      TOTAL ABDOMINAL HYSTERECTOMY W/ BILATERAL SALPINGOOPHORECTOMY N/A 03/25/2020    Procedure: HYSTERECTOMY, TOTAL, ABDOMINAL, WITH BILATERAL SALPINGO-OOPHORECTOMY;  Surgeon: Keron Brady MD;  Location: [facility] OR 2ND FLR;  Service: Oncology;  Laterality: N/A;     Family History   Problem Relation Age of Onset    Heart disease Father     Diabetes Mother 65    Drug abuse Brother     Drug abuse Brother     Cancer Maternal Aunt         lung cancer    Cancer Maternal Grandmother         stomach cancer- started in ovaries    Ovarian cancer Maternal Grandmother         stomach cancer- started in ovaries    Colon cancer Paternal Uncle     Cancer Paternal Uncle     Ovarian cancer Maternal Aunt     Ovarian cancer Maternal Aunt     Ovarian cancer Cousin         mother had  ovarian cancer    Drug abuse Son     Drug abuse Son         clean/sober since 2012    Colon cancer Son     No Known Problems Daughter     Uterine cancer Neg Hx     Breast cancer Neg Hx      Review of patient's allergies indicates:   Allergen Reactions    Oxaliplatin Other (See Comments)     Itchy hands, throat closed up, trouble hearing - during infusion    Penicillins Hives and Rash     childhood allergy       Social Determinants of Health     Tobacco Use: Low Risk  (1/22/2024)    Patient History     Smoking Tobacco Use: Never     Smokeless Tobacco Use: Never     Passive Exposure: Not on file   Alcohol Use: Not on file   Financial Resource Strain: Not on file   Food Insecurity: Not on file   Transportation Needs: Not on file   Physical Activity: Not on file   Stress: Not on file   Social Connections: Not on file   Housing Stability: Not on file   Depression: Medium Risk (1/22/2024)    Depression     Last PHQ-4: Flowsheet Data: 4      The Modified Caregiver Strain Index (MCSI) -   No data recorded   No data recorded   No data recorded   No data recorded   No data recorded   No data recorded   No data recorded   No data recorded   No data recorded   No data recorded   No data recorded   No data recorded   No data recorded   No data recorded       Medications:    Current Outpatient Medications:     acetaminophen (TYLENOL) 500 MG tablet, Take 2 tablets (1,000 mg total) by mouth every 8 (eight) hours., Disp: 60 tablet, Rfl: 0    buPROPion (WELLBUTRIN SR) 150 MG TBSR 12 hr tablet, Take 1 tablet (150 mg total) by mouth 2 (two) times daily., Disp: 180 tablet, Rfl: 0    cefUROXime (CEFTIN) 500 MG tablet, Take 1 tablet (500 mg total) by mouth every 12 (twelve) hours., Disp: 20 tablet, Rfl: 0    clindamycin phosphate 1% (CLINDAGEL) 1 % gel, Apply topically 2 (two) times daily., Disp: 60 g, Rfl: 3    doxycycline (VIBRA-TABS) 100 MG tablet, Take 1 tablet (100 mg total) by mouth 2 (two) times daily. (Patient not taking: Reported  on 1/22/2024), Disp: 60 tablet, Rfl: 3    DULoxetine (CYMBALTA) 30 MG capsule, Take 2 capsules (60 mg total) by mouth once daily., Disp: 60 capsule, Rfl: 0    ergocalciferol (ERGOCALCIFEROL) 50,000 unit Cap, Take 1 capsule (50,000 Units total) by mouth every 7 days., Disp: 4 capsule, Rfl: 2    granisetron (SANCUSO) 3.1 mg/24 hour, Place 1 patch (3.1 mg total) onto the skin every 7 days AS DIRECTED. (Patient not taking: Reported on 1/22/2024), Disp: 4 patch, Rfl: 3    ibuprofen (ADVIL,MOTRIN) 600 MG tablet, Take 1 tablet (600 mg total) by mouth every 6 (six) hours as needed for Pain., Disp: 90 tablet, Rfl: 0    Lactobacillus rhamnosus GG (CULTURELLE) 10 billion cell capsule, Take 1 capsule by mouth once daily., Disp: , Rfl:     levothyroxine (SYNTHROID) 175 MCG tablet, Take 2 tablets (350 mcg total) by mouth before breakfast., Disp: 60 tablet, Rfl: 11    LIDOcaine-prilocaine (EMLA) cream, Apply topically as needed (30 to 60 min prior chemo or port use)., Disp: 30 g, Rfl: 3    LORazepam (ATIVAN) 1 MG tablet, Take 1 tablet (1 mg total) by mouth every 12 (twelve) hours as needed for Anxiety (nausea)., Disp: 30 tablet, Rfl: 0    magic mouthwash diphen/antac/lidoc/nysta, Swish and swallow 5 mL every 4 hours as needed for mouth pain/ulcers, Disp: 120 mL, Rfl: 2    mirtazapine (REMERON) 7.5 MG Tab, Take 1 tablet (7.5 mg total) by mouth every evening. (Patient taking differently: Take 7.5 mg by mouth nightly as needed.), Disp: 30 tablet, Rfl: 0    multivitamin (THERAGRAN) per tablet, Take 1 tablet by mouth once daily., Disp: , Rfl:     olmesartan (BENICAR) 20 MG tablet, Take 1 tablet (20 mg total) by mouth once daily., Disp: 30 tablet, Rfl: 5    ondansetron (ZOFRAN-ODT) 8 MG TbDL, Take 1 tablet (8 mg total) by mouth every 8 (eight) hours as needed., Disp: 60 tablet, Rfl: 3    oxyCODONE (ROXICODONE) 5 MG immediate release tablet, Take 1 tablet (5 mg total) by mouth every 4 (four) hours as needed for Pain., Disp: 10 tablet,  Rfl: 0    senna (SENOKOT) 8.6 mg tablet, Take 2 tablets by mouth once daily., Disp: 60 tablet, Rfl: 2    trifluridine-tipiraciL (LONSURF) 20-8.19 mg Tab, Take 35 mg/m2 (4 tablets) by mouth 2 (two) times daily twice daily on days 1 to 5 and days 8 to 12 of a 28-day cycle. (Patient not taking: Reported on 1/22/2024.), Disp: 80 tablet, Rfl: 3  No current facility-administered medications for this visit.    Facility-Administered Medications Ordered in Other Visits:     HYDROmorphone injection 0.5 mg, 0.5 mg, Intravenous, Q5 Min PRN, Volpi-Abadie, Jacqueline, MD, 0.5 mg at 01/16/24 1328    ondansetron injection 4 mg, 4 mg, Intravenous, Daily PRN, Volpi-Abadie, Jacqueline, MD    oxyCODONE-acetaminophen 5-325 mg per tablet 1 tablet, 1 tablet, Oral, Q3H PRN, Volpi-Abadie, Jacqueline, MD    sodium chloride 0.9% flush 3 mL, 3 mL, Intravenous, PRN, Volpi-Abadie, Jacqueline, MD    OBJECTIVE:       ROS:  Review of Systems   Constitutional:  Negative for appetite change and unexpected weight change.   HENT:  Negative for mouth sores.    Eyes:  Negative for visual disturbance.   Respiratory:  Negative for cough and shortness of breath.    Cardiovascular:  Negative for chest pain.   Gastrointestinal:  Positive for abdominal pain and constipation. Negative for diarrhea.   Genitourinary:  Negative for frequency.   Musculoskeletal:  Positive for back pain.   Skin:  Negative for rash.   Neurological:  Negative for headaches.   Hematological:  Negative for adenopathy.   Psychiatric/Behavioral:  The patient is nervous/anxious.        Review of Symptoms      Symptom Assessment (ESAS 0-10 Scale)  Pain:  0  Dyspnea:  0  Anxiety:  0  Nausea:  0  Depression:  5  Anorexia:  0  Fatigue:  0  Insomnia:  0  Restlessness:  0  Agitation:  0       Anxiety:  Is nervous/anxious  Constipation:  Constipation    Performance Status:  80    ECOG Performance Status stGstrstastdstest:st st1st Living Arrangements:  Lives with family and Lives in  home    Psychosocial/Cultural:   See Palliative Psychosocial Note: Yes  **Primary  to Follow**  Palliative Care  Consult: No      Advance Care Planning   Advance Directives:   Living Will: No    [place]: No    Do Not Resuscitate Status: No      Decision Making:  Patient answered questions  Goals of Care: What is most important right now is to focus on symptom/pain control. Accordingly, we have decided that the best plan to meet the patient's goals includes continuing with treatment.              Physical Exam:  Vitals:    Physical Exam  Vitals reviewed.   Constitutional:       Appearance: She is not ill-appearing.   Pulmonary:      Effort: Pulmonary effort is normal.   Neurological:      Mental Status: She is alert and oriented to person, place, and time.   Psychiatric:         Mood and Affect: Mood is depressed.         Labs:  CBC:   WBC   Date Value Ref Range Status   01/22/2024 6.05 3.90 - 12.70 K/uL Final     Hemoglobin   Date Value Ref Range Status   01/22/2024 13.5 12.0 - 16.0 g/dL Final     Hematocrit   Date Value Ref Range Status   01/22/2024 43.1 37.0 - 48.5 % Final     MCV   Date Value Ref Range Status   01/22/2024 77 (L) 82 - 98 fL Final     Platelets   Date Value Ref Range Status   01/22/2024 329 150 - 450 K/uL Final       LFT:   Lab Results   Component Value Date    AST 14 01/22/2024    ALKPHOS 164 (H) 01/22/2024    BILITOT 0.4 01/22/2024       Albumin:   Albumin   Date Value Ref Range Status   01/22/2024 3.5 3.5 - 5.2 g/dL Final     Protein:   Total Protein   Date Value Ref Range Status   01/22/2024 7.2 6.0 - 8.4 g/dL Final         Signature: Fawn Snyder NP

## 2024-01-27 PROBLEM — F41.9 ANXIETY AND DEPRESSION: Status: ACTIVE | Noted: 2024-01-27

## 2024-01-27 PROBLEM — F32.A ANXIETY AND DEPRESSION: Status: ACTIVE | Noted: 2024-01-27

## 2024-01-27 PROBLEM — K85.90 ACUTE PANCREATITIS: Status: ACTIVE | Noted: 2024-01-27

## 2024-01-29 PROBLEM — C78.7 SECONDARY LIVER CANCER: Status: ACTIVE | Noted: 2024-01-29

## 2024-01-29 PROBLEM — I88.0 MESENTERIC ADENITIS: Status: ACTIVE | Noted: 2024-01-29

## 2024-01-29 PROBLEM — E16.2 HYPOGLYCEMIA: Status: ACTIVE | Noted: 2024-01-29

## 2024-01-30 NOTE — TELEPHONE ENCOUNTER
----- Message from Romana Parham sent at 1/30/2024  3:37 PM CST -----  Contact: Fay PICHARDO  Type:  Hospital Follow Up    Caller is requesting a sooner appointment.  Caller declined first available appointment listed below.  Caller will not accept being placed on the waitlist and is requesting a message be sent to doctor.  Name of Caller:MARINO Aponte   When is the first available appointment?N/A  Symptoms: Hosp F/U 1 wk   Best Call Back Number: [phone]  Please call to schedule... Thank you.. .

## 2024-01-30 NOTE — PROGRESS NOTES
Gail Mckinnon  PID# [id number]  Protocol: SWOG   IRB#: 2023.047  : NGUYEN Degroot MD  Treating Investigator: JESUS Santo MD     Randomized Phase II Trial of Encorafenib and Cetuximab with or Without Nivolumab ([facility]SC #034386) for Patients with Previously Treated, Microsatellite Stable BRAF V600E Metastatic and/or Unresectable Colorectal Cancer   Arm 1/ Nivolumab + Cetuximab + Encorafenib therapy.     January 30th, 2024     This CRC was notified that the patient is currently hospitalized due to Grade 3 Acute Pancreatitis probably related to Nivolumab per Dr. Santo.     Protocol Section 8.7 e-g, tables 8.7 and 8.8 for [facility] reporting requirements. Per protocol guidelines, AE was entered into RAVE. According to [facility]AVE, an expedited report is NOT recommended. Per the snippet from the protocol below, the patient's Grade 3 pancreatitis does not require expedited reporting. The patient's final dose of Nivolumab was over 30 days ago (12/15/23), therefore, would need to be a Grade 4 to require expedited reporting.         Dr. Santo is aware of the reporting requirements and recommendations. Will continue to monitor and adjust accordingly, if appropriate.    Nany Moura, CRC

## 2024-02-01 NOTE — TELEPHONE ENCOUNTER
"----- Message from Moncho Gilbert sent at 2/1/2024 10:44 AM CST -----  Scheduling Request        Patient Status: Est      Scheduling Appt: Discharge F/u      Time/Date Preference:  Within 1 week      Contact Preference?: [phone] (home)       Treating Provider: Marlene      Do you feel you need to be seen today? No      Additional Notes:  "Thank you for all that you do for our patients"         "

## 2024-02-01 NOTE — TELEPHONE ENCOUNTER
Pt is being d/c'd from [facility] today.  Pt states they want her to have f/u with you.  She's asking if you can review her labs and advise.

## 2024-02-01 NOTE — TELEPHONE ENCOUNTER
Her Ca level is high  Will need to start sensipar 30 mg daily to help lower serum Ca  Check Renal Panel 4 weeks  Also vit D is low. Take Over the counter Vit D 2000 IU daily

## 2024-02-02 NOTE — TELEPHONE ENCOUNTER
Pt advised of your message below and verb understanding.  She's thinking she may not be able to take sensipar with the cancer medication they will be starting her on.  She will reach out to Dr. Santo's office on that and get back with us since you are out of office today.

## 2024-02-06 NOTE — PROGRESS NOTES
GYN ONC   SUBJECTIVE:     Chief Complaint: vulvar mass  History of Present Illness:  Gail Mckinnon is a 53 y.o. carries diagnosis of recurrent widely metastatic colon cancer who presented with pedunculated vulvar mass. Now 3 weeks s/p excision. Benign path. Doing well post operatively. Was admitted with pancreatitis related to treatment.     DIAGNOSIS:   01/17/2024 JHL/pjl     LEFT LABIAL LESION, EXCISION:   - LOBULAR CAPILLARY HEMANGIOMA (PYOGENIC GRANULOMA).     Oncology History   Malignant neoplasm of sigmoid colon   3/16/2020 Initial Diagnosis    Malignant neoplasm of sigmoid colon     3/31/2020 Cancer Staged    Staging form: Colon and Rectum, AJCC 8th Edition  - Clinical stage from 3/31/2020: Stage IIIC (cT4b, cN2a, cM0)     5/6/2020 - 11/17/2020 Chemotherapy    Treatment Summary   Plan Name: OP FOLFOX 6 Q2W  Treatment Goal: Curative  Status: Inactive  Start Date: 5/6/2020  End Date: 11/6/2020  Provider: Dylan Leyva MD  Chemotherapy: fluorouraciL injection 945 mg, 400 mg/m2 = 945 mg, Intravenous, Clinic/HOD 1 time, 14 of 14 cycles  Administration: 945 mg (5/6/2020), 945 mg (5/20/2020), 945 mg (6/3/2020), 945 mg (6/17/2020), 945 mg (7/29/2020), 945 mg (8/12/2020), 945 mg (8/26/2020), 945 mg (9/9/2020), 945 mg (9/23/2020), 945 mg (10/6/2020), 945 mg (10/21/2020), 945 mg (11/4/2020)  fluorouraciL 2,400 mg/m2 = 5,665 mg in sodium chloride 0.9% 240 mL chemo infusion, 2,400 mg/m2 = 5,665 mg, Intravenous, Over 46 hours, 14 of 14 cycles  Administration: 5,665 mg (5/6/2020), 5,665 mg (5/20/2020), 5,665 mg (6/3/2020), 5,665 mg (6/17/2020), 5,665 mg (7/29/2020), 5,665 mg (8/12/2020), 5,665 mg (8/26/2020), 5,665 mg (9/9/2020), 5,665 mg (9/23/2020), 5,665 mg (10/6/2020), 5,665 mg (10/21/2020), 5,665 mg (11/4/2020)  leucovorin calcium 900 mg in dextrose 5 % 250 mL infusion, 945 mg, Intravenous, [facility] 1 time, 14 of 14 cycles  Administration: 900 mg (5/6/2020), 900 mg (5/20/2020), 900 mg (6/3/2020), 900 mg  (6/17/2020), 945 mg (6/30/2020), 945 mg (7/20/2020), 945 mg (7/29/2020), 945 mg (8/12/2020), 945 mg (8/26/2020), 945 mg (9/9/2020), 945 mg (9/23/2020), 945 mg (10/6/2020), 945 mg (10/21/2020), 945 mg (11/4/2020)  oxaliplatin (ELOXATIN) 200 mg in dextrose 5 % 500 mL chemo infusion, 201 mg, Intravenous, Clinic/HOD 1 time, 6 of 6 cycles  Dose modification: 65 mg/m2 (original dose 85 mg/m2, Cycle 6)  Administration: 200 mg (5/6/2020), 200 mg (5/20/2020), 200 mg (6/3/2020), 200 mg (6/17/2020), 201 mg (6/30/2020), 150 mg (7/20/2020)     5/3/2021 Cancer Staged    Staging form: Colon and Rectum, AJCC 8th Edition  - Clinical stage from 5/3/2021: Stage Unknown (rcT0, cNX, cM1)     6/16/2021 - 8/2/2023 Chemotherapy    Treatment Summary   Plan Name: OP COLORECTAL FOLFIRI + BEVACIZUMAB Q2W  Treatment Goal: Control  Status: Inactive  Start Date: 6/16/2021  End Date: 8/2/2023  Provider: Marah Santo MD  Chemotherapy: fluorouraciL injection 990 mg, 400 mg/m2 = 990 mg, Intravenous, Clinic/H[facility] 1 time, 15 of 15 cycles  Administration: 990 mg (6/16/2021), 990 mg (6/30/2021), 990 mg (7/14/2021), 980 mg (7/28/2021), 990 mg (8/11/2021), 990 mg (8/25/2021), 990 mg (9/8/2021), 990 mg (9/22/2021), 990 mg (10/6/2021), 990 mg (10/20/2021), 990 mg (11/3/2021), 990 mg (12/1/2021), 990 mg (12/15/2021), 990 mg (11/17/2021), 990 mg (12/28/2021)  fluorouraciL 2,400 mg/m2 = 5,950 mg in sodium chloride 0.9% 240 mL chemo infusion, 2,400 mg/m2 = 5,950 mg, Intravenous, Over 46 hours, 43 of 46 cycles  Administration: 5,950 mg (6/16/2021), 5,950 mg (6/30/2021), 5,950 mg (7/14/2021), 5,880 mg (7/28/2021), 5,930 mg (8/11/2021), 5,930 mg (8/25/2021), 5,930 mg (9/8/2021), 5,930 mg (9/22/2021), 5,930 mg (10/6/2021), 5,930 mg (10/20/2021), 5,930 mg (11/3/2021), 5,930 mg (12/1/2021), 5,930 mg (12/15/2021), 5,930 mg (11/17/2021), 5,930 mg (12/28/2021), 6,050 mg (5/11/2022), 6,025 mg (3/9/2022), 6,025 mg (2/23/2022), 5,930 mg (2/2/2022), 5,930 mg (1/19/2022),  6,050 mg (4/20/2022), 6,025 mg (4/6/2022), 6,025 mg (3/23/2022), 6,050 mg (6/1/2022), 6,050 mg (6/15/2022), 6,050 mg (10/19/2022), 6,050 mg (10/5/2022), 6,050 mg (11/2/2022), 6,050 mg (11/16/2022), 6,050 mg (11/30/2022), 6,050 mg (1/4/2023), 6,050 mg (12/19/2022), 6,050 mg (1/18/2023), 6,000 mg (5/22/2023), 6,000 mg (4/26/2023), 6,000 mg (4/11/2023), 6,000 mg (2/28/2023), 6,050 mg (2/14/2023), 6,000 mg (2/1/2023), 6,000 mg (7/5/2023), 6,000 mg (6/19/2023), 6,000 mg (6/5/2023), 6,000 mg (7/31/2023)  bevacizumab (AVASTIN) 600 mg in sodium chloride 0.9% 100 mL chemo infusion, 660 mg, Intravenous, [facility] 1 time, 36 of 36 cycles  Dose modification: 5 mg/kg (original dose 5 mg/kg, Cycle 26, Reason: MD Discretion, Comment: 5 mg/kg original dose)  Administration: 600 mg (6/30/2021), 600 mg (7/14/2021), 600 mg (7/28/2021), 600 mg (6/16/2021), 600 mg (8/11/2021), 655 mg (8/25/2021), 600 mg (9/8/2021), 600 mg (9/22/2021), 600 mg (10/6/2021), 600 mg (10/20/2021), 600 mg (11/3/2021), 655 mg (12/1/2021), 600 mg (12/15/2021), 600 mg (11/17/2021), 600 mg (12/28/2021), 600 mg (5/11/2022), 600 mg (3/9/2022), 600 mg (2/23/2022), 600 mg (2/2/2022), 600 mg (1/19/2022), 600 mg (4/20/2022), 680 mg (4/6/2022), 600 mg (3/23/2022), 680 mg (10/5/2022), 680 mg (10/19/2022), 680 mg (11/2/2022), 680 mg (11/16/2022), 680 mg (11/30/2022), 680 mg (1/4/2023), 680 mg (12/19/2022), 680 mg (1/18/2023), 680 mg (3/28/2023), 680 mg (3/14/2023), 680 mg (2/28/2023), 680 mg (2/14/2023), 680 mg (2/1/2023)  irinotecan (CAMPTOSAR) 440 mg in sodium chloride 0.9% 500 mL chemo infusion, 446 mg, Intravenous, [facility] [place] 1 time, 45 of 48 cycles  Dose modification: 180 mg/m2 (original dose 180 mg/m2, Cycle 24)  Administration: 440 mg (6/16/2021), 440 mg (6/30/2021), 440 mg (7/14/2021), 440 mg (7/28/2021), 440 mg (8/11/2021), 444 mg (8/25/2021), 440 mg (9/8/2021), 440 mg (9/22/2021), 440 mg (10/6/2021), 440 mg (10/20/2021), 440 mg (11/3/2021), 440 mg (12/1/2021),  440 mg (12/15/2021), 440 mg (11/17/2021), 440 mg (12/28/2021), 440 mg (5/11/2022), 440 mg (3/9/2022), 440 mg (2/23/2022), 440 mg (2/2/2022), 440 mg (1/19/2022), 440 mg (4/20/2022), 440 mg (4/6/2022), 440 mg (3/24/2022), 440 mg (6/1/2022), 440 mg (6/15/2022), 440 mg (10/19/2022), 440 mg (10/5/2022), 440 mg (11/2/2022), 440 mg (11/16/2022), 440 mg (11/30/2022), 440 mg (1/4/2023), 440 mg (12/19/2022), 440 mg (1/18/2023), 440 mg (5/22/2023), 440 mg (4/26/2023), 440 mg (4/11/2023), 440 mg (3/28/2023), 440 mg (3/14/2023), 440 mg (2/28/2023), 440 mg (2/14/2023), 440 mg (2/1/2023), 440 mg (7/5/2023), 440 mg (6/19/2023), 440 mg (6/5/2023), 440 mg (7/31/2023)  bevacizumab-awwb (MVASI) 5 mg/kg = 680 mg in sodium chloride 0.9% 100 mL infusion, 5 mg/kg = 680 mg (100 % of original dose 5 mg/kg), Intravenous, Clinic/hospitals 1 time, 7 of 10 cycles  Dose modification: 5 mg/kg (original dose 5 mg/kg, Cycle 39)  Administration: 680 mg (4/11/2023), 680 mg (4/26/2023), 680 mg (5/22/2023), 680 mg (7/5/2023), 680 mg (6/19/2023), 680 mg (6/5/2023), 680 mg (7/31/2023)     8/17/2023 - 8/18/2023 Chemotherapy    Treatment Summary   Plan Name: OP CETUXIMAB 500MG Q2W  Treatment Goal: Control  Status: Inactive  Start Date:   End Date:   Provider: Marah Santo MD  Chemotherapy: [No matching medication found in this treatment plan]     8/24/2023 - 12/29/2023 Chemotherapy    Treatment Summary   Plan Name: [facility] - ARM 1 ENCORAFENIB  CETUXIMAB NIVOLUMAB  Treatment Goal: Palliative  Status: Inactive  Start Date: 8/24/2023  End Date: 12/29/2023  Provider: Marah Santo MD  Chemotherapy: cetuximab (ERBITUX) 100 mg/50 mL chemo infusion 1,200 mg, 1,230 mg, Intravenous, Clinic/hospitals 1 time, 5 of 12 cycles  Administration: 1,200 mg (8/24/2023), 1,200 mg (9/8/2023), 1,200 mg (9/22/2023), 1,200 mg (10/6/2023), 1,200 mg (10/20/2023), 1,200 mg (11/3/2023), 1,200 mg (11/17/2023), 1,200 mg (12/1/2023), 1,200 mg (12/15/2023), 1,200 mg (12/29/2023)  encorafenib  75 mg Cap, 300 mg, Oral, Daily, 1 of 1 cycle, Start date: --, End date: --     1/16/2024 -  Chemotherapy    Treatment Summary   Plan Name: OP BEVACIZUMAB Q4W  Treatment Goal: Control  Status: Active  Start Date: 1/16/2024 (Planned)  End Date: 12/3/2024 (Planned)  Provider: Marah Santo MD  Chemotherapy: bevacizumab-awwb (MVASI) 5 mg/kg = 625 mg in sodium chloride 0.9% 100 mL infusion, 5 mg/kg = 625 mg (original dose ), Intravenous, Clinic/[facility] 1 time, 0 of 12 cycles  Dose modification: 5 mg/kg (Cycle 1, Reason: Other (see comments), Comment: Given with Lonsurf)     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   4/3/2020 Initial Diagnosis    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     5/6/2020 - 11/17/2020 Chemotherapy    Treatment Summary   Plan Name: OP FOLFOX 6 Q2W  Treatment Goal: Curative  Status: Inactive  Start Date: 5/6/2020  End Date: 11/6/2020  Provider: Dylan Leyva MD  Chemotherapy: fluorouraciL injection 945 mg, 400 mg/m2 = 945 mg, Intravenous, Clinic/[facility] 1 time, 14 of 14 cycles  Administration: 945 mg (5/6/2020), 945 mg (5/20/2020), 945 mg (6/3/2020), 945 mg (6/17/2020), 945 mg (7/29/2020), 945 mg (8/12/2020), 945 mg (8/26/2020), 945 mg (9/9/2020), 945 mg (9/23/2020), 945 mg (10/6/2020), 945 mg (10/21/2020), 945 mg (11/4/2020)  fluorouraciL 2,400 mg/m2 = 5,665 mg in sodium chloride 0.9% 240 mL chemo infusion, 2,400 mg/m2 = 5,665 mg, Intravenous, Over 46 hours, 14 of 14 cycles  Administration: 5,665 mg (5/6/2020), 5,665 mg (5/20/2020), 5,665 mg (6/3/2020), 5,665 mg (6/17/2020), 5,665 mg (7/29/2020), 5,665 mg (8/12/2020), 5,665 mg (8/26/2020), 5,665 mg (9/9/2020), 5,665 mg (9/23/2020), 5,665 mg (10/6/2020), 5,665 mg (10/21/2020), 5,665 mg (11/4/2020)  leucovorin calcium 900 mg in dextrose 5 % 250 mL infusion, 945 mg, Intravenous, [facility] [place] 1 time, 14 of 14 cycles  Administration: 900 mg (5/6/2020), 900 mg (5/20/2020), 900 mg (6/3/2020), 900 mg (6/17/2020), 945 mg (6/30/2020), 945 mg (7/20/2020), 945 mg  (7/29/2020), 945 mg (8/12/2020), 945 mg (8/26/2020), 945 mg (9/9/2020), 945 mg (9/23/2020), 945 mg (10/6/2020), 945 mg (10/21/2020), 945 mg (11/4/2020)  oxaliplatin (ELOXATIN) 200 mg in dextrose 5 % 500 mL chemo infusion, 201 mg, Intravenous, Clinic/H[facility] 1 time, 6 of 6 cycles  Dose modification: 65 mg/m2 (original dose 85 mg/m2, Cycle 6)  Administration: 200 mg (5/6/2020), 200 mg (5/20/2020), 200 mg (6/3/2020), 200 mg (6/17/2020), 201 mg (6/30/2020), 150 mg (7/20/2020)     6/16/2021 - 8/2/2023 Chemotherapy    Treatment Summary   Plan Name: OP COLORECTAL FOLFIRI + BEVACIZUMAB Q2W  Treatment Goal: Control  Status: Inactive  Start Date: 6/16/2021  End Date: 8/2/2023  Provider: Marah Santo MD  Chemotherapy: fluorouraciL injection 990 mg, 400 mg/m2 = 990 mg, Intravenous, Clinic/[facility] 1 time, 15 of 15 cycles  Administration: 990 mg (6/16/2021), 990 mg (6/30/2021), 990 mg (7/14/2021), 980 mg (7/28/2021), 990 mg (8/11/2021), 990 mg (8/25/2021), 990 mg (9/8/2021), 990 mg (9/22/2021), 990 mg (10/6/2021), 990 mg (10/20/2021), 990 mg (11/3/2021), 990 mg (12/1/2021), 990 mg (12/15/2021), 990 mg (11/17/2021), 990 mg (12/28/2021)  fluorouraciL 2,400 mg/m2 = 5,950 mg in sodium chloride 0.9% 240 mL chemo infusion, 2,400 mg/m2 = 5,950 mg, Intravenous, Over 46 hours, 43 of 46 cycles  Administration: 5,950 mg (6/16/2021), 5,950 mg (6/30/2021), 5,950 mg (7/14/2021), 5,880 mg (7/28/2021), 5,930 mg (8/11/2021), 5,930 mg (8/25/2021), 5,930 mg (9/8/2021), 5,930 mg (9/22/2021), 5,930 mg (10/6/2021), 5,930 mg (10/20/2021), 5,930 mg (11/3/2021), 5,930 mg (12/1/2021), 5,930 mg (12/15/2021), 5,930 mg (11/17/2021), 5,930 mg (12/28/2021), 6,050 mg (5/11/2022), 6,025 mg (3/9/2022), 6,025 mg (2/23/2022), 5,930 mg (2/2/2022), 5,930 mg (1/19/2022), 6,050 mg (4/20/2022), 6,025 mg (4/6/2022), 6,025 mg (3/23/2022), 6,050 mg (6/1/2022), 6,050 mg (6/15/2022), 6,050 mg (10/19/2022), 6,050 mg (10/5/2022), 6,050 mg (11/2/2022), 6,050 mg (11/16/2022), 6,050  mg (11/30/2022), 6,050 mg (1/4/2023), 6,050 mg (12/19/2022), 6,050 mg (1/18/2023), 6,000 mg (5/22/2023), 6,000 mg (4/26/2023), 6,000 mg (4/11/2023), 6,000 mg (2/28/2023), 6,050 mg (2/14/2023), 6,000 mg (2/1/2023), 6,000 mg (7/5/2023), 6,000 mg (6/19/2023), 6,000 mg (6/5/2023), 6,000 mg (7/31/2023)  bevacizumab (AVASTIN) 600 mg in sodium chloride 0.9% 100 mL chemo infusion, 660 mg, Intravenous, Clinic/\[facility]\"" 1 time, 36 of 36 cycles  Dose modification: 5 mg/kg (original dose 5 mg/kg, Cycle 26, Reason: MD Discretion, Comment: 5 mg/kg original dose)  Administration: 600 mg (6/30/2021), 600 mg (7/14/2021), 600 mg (7/28/2021), 600 mg (6/16/2021), 600 mg (8/11/2021), 655 mg (8/25/2021), 600 mg (9/8/2021), 600 mg (9/22/2021), 600 mg (10/6/2021), 600 mg (10/20/2021), 600 mg (11/3/2021), 655 mg (12/1/2021), 600 mg (12/15/2021), 600 mg (11/17/2021), 600 mg (12/28/2021), 600 mg (5/11/2022), 600 mg (3/9/2022), 600 mg (2/23/2022), 600 mg (2/2/2022), 600 mg (1/19/2022), 600 mg (4/20/2022), 680 mg (4/6/2022), 600 mg (3/23/2022), 680 mg (10/5/2022), 680 mg (10/19/2022), 680 mg (11/2/2022), 680 mg (11/16/2022), 680 mg (11/30/2022), 680 mg (1/4/2023), 680 mg (12/19/2022), 680 mg (1/18/2023), 680 mg (3/28/2023), 680 mg (3/14/2023), 680 mg (2/28/2023), 680 mg (2/14/2023), 680 mg (2/1/2023)  irinotecan (CAMPTOSAR) 440 mg in sodium chloride 0.9% 500 mL chemo infusion, 446 mg, Intravenous, Clinic/\[facility]\"" 1 time, 45 of 48 cycles  Dose modification: 180 mg/m2 (original dose 180 mg/m2, Cycle 24)  Administration: 440 mg (6/16/2021), 440 mg (6/30/2021), 440 mg (7/14/2021), 440 mg (7/28/2021), 440 mg (8/11/2021), 444 mg (8/25/2021), 440 mg (9/8/2021), 440 mg (9/22/2021), 440 mg (10/6/2021), 440 mg (10/20/2021), 440 mg (11/3/2021), 440 mg (12/1/2021), 440 mg (12/15/2021), 440 mg (11/17/2021), 440 mg (12/28/2021), 440 mg (5/11/2022), 440 mg (3/9/2022), 440 mg (2/23/2022), 440 mg (2/2/2022), 440 mg (1/19/2022), 440 mg (4/20/2022), 440 mg (4/6/2022), 440  mg (3/24/2022), 440 mg (6/1/2022), 440 mg (6/15/2022), 440 mg (10/19/2022), 440 mg (10/5/2022), 440 mg (11/2/2022), 440 mg (11/16/2022), 440 mg (11/30/2022), 440 mg (1/4/2023), 440 mg (12/19/2022), 440 mg (1/18/2023), 440 mg (5/22/2023), 440 mg (4/26/2023), 440 mg (4/11/2023), 440 mg (3/28/2023), 440 mg (3/14/2023), 440 mg (2/28/2023), 440 mg (2/14/2023), 440 mg (2/1/2023), 440 mg (7/5/2023), 440 mg (6/19/2023), 440 mg (6/5/2023), 440 mg (7/31/2023)  bevacizumab-awwb (MVASI) 5 mg/kg = 680 mg in sodium chloride 0.9% 100 mL infusion, 5 mg/kg = 680 mg (100 % of original dose 5 mg/kg), Intravenous, Clinic/H[facility] 1 time, 7 of 10 cycles  Dose modification: 5 mg/kg (original dose 5 mg/kg, Cycle 39)  Administration: 680 mg (4/11/2023), 680 mg (4/26/2023), 680 mg (5/22/2023), 680 mg (7/5/2023), 680 mg (6/19/2023), 680 mg (6/5/2023), 680 mg (7/31/2023)     8/17/2023 - 8/18/2023 Chemotherapy    Treatment Summary   Plan Name: OP CETUXIMAB 500MG Q2W  Treatment Goal: Control  Status: Inactive  Start Date:   End Date:   Provider: Marah Santo MD  Chemotherapy: [No matching medication found in this treatment plan]     8/24/2023 - 12/29/2023 Chemotherapy    Treatment Summary   Plan Name: [facility] - ARM 1 ENCORAFENIB  CETUXIMAB NIVOLUMAB  Treatment Goal: Palliative  Status: Inactive  Start Date: 8/24/2023  End Date: 12/29/2023  Provider: Marah Santo MD  Chemotherapy: cetuximab (ERBITUX) 100 mg/50 mL chemo infusion 1,200 mg, 1,230 mg, Intravenous, Clinic/\[facility]\"" 1 time, 5 of 12 cycles  Administration: 1,200 mg (8/24/2023), 1,200 mg (9/8/2023), 1,200 mg (9/22/2023), 1,200 mg (10/6/2023), 1,200 mg (10/20/2023), 1,200 mg (11/3/2023), 1,200 mg (11/17/2023), 1,200 mg (12/1/2023), 1,200 mg (12/15/2023), 1,200 mg (12/29/2023)  encorafenib 75 mg Cap, 300 mg, Oral, Daily, 1 of 1 cycle, Start date: --, End date: --     1/16/2024 -  Chemotherapy    Treatment Summary   Plan Name: OP BEVACIZUMAB Q4W  Treatment Goal: Control  Status:  Active  Start Date: 1/16/2024 (Planned)  End Date: 12/3/2024 (Planned)  Provider: Marah Santo MD  Chemotherapy: bevacizumab-awwb (MVASI) 5 mg/kg = 625 mg in sodium chloride 0.9% 100 mL infusion, 5 mg/kg = 625 mg (original dose ), Intravenous, Clinic/H[facility] 1 time, 0 of 12 cycles  Dose modification: 5 mg/kg (Cycle 1, Reason: Other (see comments), Comment: Given with Lonsurf)         Review of Systems:  Review of Systems per HPI      OBJECTIVE:     Physical Exam:  Physical Exam  Genitourinary:     Comments: Incision well healing. Sutures still in place.         ASSESSMENT:       ICD-10-CM ICD-9-CM    1. Vulval lesion  N90.89 624.8         Plan:      Well healed post excision  Pathology benign  Discharge from GYN ONC clinic , released to care of benign GYN      Eli Her MD  Gynecologic Oncology

## 2024-02-06 NOTE — TELEPHONE ENCOUNTER
Called pt to reschedule her PT apt today, she will need to reassess after her discharge from the hospital on 2/8/24

## 2024-02-06 NOTE — TELEPHONE ENCOUNTER
"Pt advised Dr. Moss s/w Dr. Santo about pt starting sensipar.  Dr. Santo stated "no concerns on my end."  Pt advised RX was sent to [facility] pharmacy and she needs to start it asap to get Ca levels down.  Pt verb understanding.   "

## 2024-02-08 NOTE — PROGRESS NOTES
PSYCHO-ONCOLOGY NOTE/ Individual Psychotherapy     Date: 2/8/2024   Site:  [facility]      Therapeutic Intervention: Met with patient.  Outpatient - Supportive psychotherapy 60 min - CPT Code 31762    This includes face to face time and non-face to face time preparing to see the patient, obtaining and/or reviewing separately obtained history, documenting clinical information in the electronic or other health record, independently interpreting results and communicating results to the patient/family/caregiver, or care coordinator.      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    Anxiety    Insomnia    Insomnia    Other constipation    Nausea and vomiting    Mucositis due to chemotherapy    Morbid obesity    Secondary adenocarcinoma of lymph node    Thyroid nodule    Hypercalcemia    Transaminitis    Hypophosphatemia    Functional diarrhea    Primary hypertension    Diarrhea of presumed infectious origin    Calculus of gallbladder without cholecystitis without obstruction    ACP (advance care planning)    Abdominal pain    Shortness of breath    Secondary malignant neoplasm of retroperitoneum    Depressive disorder    Hypothyroidism    Immunodeficiency due to chemotherapy    Acneiform rash    Chronic midline low back pain without sciatica    Chemotherapy-induced fatigue    Vulval lesion    Iron deficiency anemia due to chronic blood loss    Acute pancreatitis    Anxiety and depression    Hypoglycemia    Secondary liver cancer    Mesenteric adenitis       Chief complaint/reason for encounter: anger and anxiety   Met with patient to evaluate psychosocial adaptation to  diagnosis/treatment/survivorship of metastatic colon cancer    Current Medications  Current Outpatient Medications   Medication    acetaminophen (TYLENOL) 500 MG tablet    buPROPion (WELLBUTRIN SR) 150 MG TBSR 12 hr tablet    cinacalcet (SENSIPAR) 30 MG Tab    DULoxetine (CYMBALTA) 30 MG capsule    ibuprofen (ADVIL,MOTRIN) 600 MG tablet    Lactobacillus rhamnosus GG (CULTURELLE) 10 billion cell capsule    levoFLOXacin (LEVAQUIN) 750 MG tablet    levothyroxine (SYNTHROID) 175 MCG tablet    LIDOcaine-prilocaine (EMLA) cream    LORazepam (ATIVAN) 1 MG tablet    metroNIDAZOLE (FLAGYL) 500 MG tablet    mirtazapine (REMERON) 7.5 MG Tab    multivitamin (THERAGRAN) per tablet    olmesartan (BENICAR) 20 MG tablet    ondansetron (ZOFRAN-ODT) 8 MG TbDL    predniSONE (DELTASONE) 10 MG tablet    senna (SENOKOT) 8.6 mg tablet    trifluridine-tipiraciL (LONSURF) 20-8.19 mg Tab     No current facility-administered medications for this visit.     Facility-Administered Medications Ordered in Other Visits   Medication Frequency    ondansetron injection 4 mg Daily PRN    sodium chloride 0.9% flush 3 mL PRN       ONCOLOGY HISTORY  Oncology History   Malignant neoplasm of sigmoid colon   3/16/2020 Initial Diagnosis    Malignant neoplasm of sigmoid colon     3/31/2020 Cancer Staged    Staging form: Colon and Rectum, AJCC 8th Edition  - Clinical stage from 3/31/2020: Stage IIIC (cT4b, cN2a, cM0)     5/6/2020 - 11/17/2020 Chemotherapy    Treatment Summary   Plan Name: OP FOLFOX 6 Q2W  Treatment Goal: Curative  Status: Inactive  Start Date: 5/6/2020  End Date: 11/6/2020  Provider: Dylan Leyva MD  Chemotherapy: fluorouraciL injection 945 mg, 400 mg/m2 = 945 mg, Intravenous, Clinic/[facility] 1 time, 14 of 14 cycles  Administration: 945 mg (5/6/2020), 945 mg (5/20/2020), 945 mg (6/3/2020), 945 mg (6/17/2020), 945 mg (7/29/2020), 945 mg (8/12/2020), 945 mg (8/26/2020), 945 mg (9/9/2020), 945 mg (9/23/2020), 945 mg (10/6/2020), 945 mg  (10/21/2020), 945 mg (11/4/2020)  fluorouraciL 2,400 mg/m2 = 5,665 mg in sodium chloride 0.9% 240 mL chemo infusion, 2,400 mg/m2 = 5,665 mg, Intravenous, Over 46 hours, 14 of 14 cycles  Administration: 5,665 mg (5/6/2020), 5,665 mg (5/20/2020), 5,665 mg (6/3/2020), 5,665 mg (6/17/2020), 5,665 mg (7/29/2020), 5,665 mg (8/12/2020), 5,665 mg (8/26/2020), 5,665 mg (9/9/2020), 5,665 mg (9/23/2020), 5,665 mg (10/6/2020), 5,665 mg (10/21/2020), 5,665 mg (11/4/2020)  leucovorin calcium 900 mg in dextrose 5 % 250 mL infusion, 945 mg, Intravenous, Clinic/[facility] 1 time, 14 of 14 cycles  Administration: 900 mg (5/6/2020), 900 mg (5/20/2020), 900 mg (6/3/2020), 900 mg (6/17/2020), 945 mg (6/30/2020), 945 mg (7/20/2020), 945 mg (7/29/2020), 945 mg (8/12/2020), 945 mg (8/26/2020), 945 mg (9/9/2020), 945 mg (9/23/2020), 945 mg (10/6/2020), 945 mg (10/21/2020), 945 mg (11/4/2020)  oxaliplatin (ELOXATIN) 200 mg in dextrose 5 % 500 mL chemo infusion, 201 mg, Intravenous, Clinic/[facility] 1 time, 6 of 6 cycles  Dose modification: 65 mg/m2 (original dose 85 mg/m2, Cycle 6)  Administration: 200 mg (5/6/2020), 200 mg (5/20/2020), 200 mg (6/3/2020), 200 mg (6/17/2020), 201 mg (6/30/2020), 150 mg (7/20/2020)     5/3/2021 Cancer Staged    Staging form: Colon and Rectum, AJCC 8th Edition  - Clinical stage from 5/3/2021: Stage Unknown (rcT0, cNX, cM1)     6/16/2021 - 8/2/2023 Chemotherapy    Treatment Summary   Plan Name: OP COLORECTAL FOLFIRI + BEVACIZUMAB Q2W  Treatment Goal: Control  Status: Inactive  Start Date: 6/16/2021  End Date: 8/2/2023  Provider: Marah Santo MD  Chemotherapy: fluorouraciL injection 990 mg, 400 mg/m2 = 990 mg, Intravenous, Clinic/[facility] 1 time, 15 of 15 cycles  Administration: 990 mg (6/16/2021), 990 mg (6/30/2021), 990 mg (7/14/2021), 980 mg (7/28/2021), 990 mg (8/11/2021), 990 mg (8/25/2021), 990 mg (9/8/2021), 990 mg (9/22/2021), 990 mg (10/6/2021), 990 mg (10/20/2021), 990 mg (11/3/2021), 990 mg (12/1/2021), 990 mg  (12/15/2021), 990 mg (11/17/2021), 990 mg (12/28/2021)  fluorouraciL 2,400 mg/m2 = 5,950 mg in sodium chloride 0.9% 240 mL chemo infusion, 2,400 mg/m2 = 5,950 mg, Intravenous, Over 46 hours, 43 of 46 cycles  Administration: 5,950 mg (6/16/2021), 5,950 mg (6/30/2021), 5,950 mg (7/14/2021), 5,880 mg (7/28/2021), 5,930 mg (8/11/2021), 5,930 mg (8/25/2021), 5,930 mg (9/8/2021), 5,930 mg (9/22/2021), 5,930 mg (10/6/2021), 5,930 mg (10/20/2021), 5,930 mg (11/3/2021), 5,930 mg (12/1/2021), 5,930 mg (12/15/2021), 5,930 mg (11/17/2021), 5,930 mg (12/28/2021), 6,050 mg (5/11/2022), 6,025 mg (3/9/2022), 6,025 mg (2/23/2022), 5,930 mg (2/2/2022), 5,930 mg (1/19/2022), 6,050 mg (4/20/2022), 6,025 mg (4/6/2022), 6,025 mg (3/23/2022), 6,050 mg (6/1/2022), 6,050 mg (6/15/2022), 6,050 mg (10/19/2022), 6,050 mg (10/5/2022), 6,050 mg (11/2/2022), 6,050 mg (11/16/2022), 6,050 mg (11/30/2022), 6,050 mg (1/4/2023), 6,050 mg (12/19/2022), 6,050 mg (1/18/2023), 6,000 mg (5/22/2023), 6,000 mg (4/26/2023), 6,000 mg (4/11/2023), 6,000 mg (2/28/2023), 6,050 mg (2/14/2023), 6,000 mg (2/1/2023), 6,000 mg (7/5/2023), 6,000 mg (6/19/2023), 6,000 mg (6/5/2023), 6,000 mg (7/31/2023)  bevacizumab (AVASTIN) 600 mg in sodium chloride 0.9% 100 mL chemo infusion, 660 mg, Intravenous, [facility] 1 time, 36 of 36 cycles  Dose modification: 5 mg/kg (original dose 5 mg/kg, Cycle 26, Reason: MD Discretion, Comment: 5 mg/kg original dose)  Administration: 600 mg (6/30/2021), 600 mg (7/14/2021), 600 mg (7/28/2021), 600 mg (6/16/2021), 600 mg (8/11/2021), 655 mg (8/25/2021), 600 mg (9/8/2021), 600 mg (9/22/2021), 600 mg (10/6/2021), 600 mg (10/20/2021), 600 mg (11/3/2021), 655 mg (12/1/2021), 600 mg (12/15/2021), 600 mg (11/17/2021), 600 mg (12/28/2021), 600 mg (5/11/2022), 600 mg (3/9/2022), 600 mg (2/23/2022), 600 mg (2/2/2022), 600 mg (1/19/2022), 600 mg (4/20/2022), 680 mg (4/6/2022), 600 mg (3/23/2022), 680 mg (10/5/2022), 680 mg (10/19/2022), 680 mg  (11/2/2022), 680 mg (11/16/2022), 680 mg (11/30/2022), 680 mg (1/4/2023), 680 mg (12/19/2022), 680 mg (1/18/2023), 680 mg (3/28/2023), 680 mg (3/14/2023), 680 mg (2/28/2023), 680 mg (2/14/2023), 680 mg (2/1/2023)  irinotecan (CAMPTOSAR) 440 mg in sodium chloride 0.9% 500 mL chemo infusion, 446 mg, Intravenous, Clinic/[facility] 1 time, 45 of 48 cycles  Dose modification: 180 mg/m2 (original dose 180 mg/m2, Cycle 24)  Administration: 440 mg (6/16/2021), 440 mg (6/30/2021), 440 mg (7/14/2021), 440 mg (7/28/2021), 440 mg (8/11/2021), 444 mg (8/25/2021), 440 mg (9/8/2021), 440 mg (9/22/2021), 440 mg (10/6/2021), 440 mg (10/20/2021), 440 mg (11/3/2021), 440 mg (12/1/2021), 440 mg (12/15/2021), 440 mg (11/17/2021), 440 mg (12/28/2021), 440 mg (5/11/2022), 440 mg (3/9/2022), 440 mg (2/23/2022), 440 mg (2/2/2022), 440 mg (1/19/2022), 440 mg (4/20/2022), 440 mg (4/6/2022), 440 mg (3/24/2022), 440 mg (6/1/2022), 440 mg (6/15/2022), 440 mg (10/19/2022), 440 mg (10/5/2022), 440 mg (11/2/2022), 440 mg (11/16/2022), 440 mg (11/30/2022), 440 mg (1/4/2023), 440 mg (12/19/2022), 440 mg (1/18/2023), 440 mg (5/22/2023), 440 mg (4/26/2023), 440 mg (4/11/2023), 440 mg (3/28/2023), 440 mg (3/14/2023), 440 mg (2/28/2023), 440 mg (2/14/2023), 440 mg (2/1/2023), 440 mg (7/5/2023), 440 mg (6/19/2023), 440 mg (6/5/2023), 440 mg (7/31/2023)  bevacizumab-awwb (MVASI) 5 mg/kg = 680 mg in sodium chloride 0.9% 100 mL infusion, 5 mg/kg = 680 mg (100 % of original dose 5 mg/kg), Intravenous, Clinic/[facility] 1 time, 7 of 10 cycles  Dose modification: 5 mg/kg (original dose 5 mg/kg, Cycle 39)  Administration: 680 mg (4/11/2023), 680 mg (4/26/2023), 680 mg (5/22/2023), 680 mg (7/5/2023), 680 mg (6/19/2023), 680 mg (6/5/2023), 680 mg (7/31/2023)     8/17/2023 - 8/18/2023 Chemotherapy    Treatment Summary   Plan Name: OP CETUXIMAB 500MG Q2W  Treatment Goal: Control  Status: Inactive  Start Date:   End Date:   Provider: Marah Santo MD  Chemotherapy: [No matching  medication found in this treatment plan]     8/24/2023 - 12/29/2023 Chemotherapy    Treatment Summary   Plan Name: [facility] - ARM 1 ENCORAFENIB  CETUXIMAB NIVOLUMAB  Treatment Goal: Palliative  Status: Inactive  Start Date: 8/24/2023  End Date: 12/29/2023  Provider: Marah Santo MD  Chemotherapy: cetuximab (ERBITUX) 100 mg/50 mL chemo infusion 1,200 mg, 1,230 mg, Intravenous, Clinic/[facility] 1 time, 5 of 12 cycles  Administration: 1,200 mg (8/24/2023), 1,200 mg (9/8/2023), 1,200 mg (9/22/2023), 1,200 mg (10/6/2023), 1,200 mg (10/20/2023), 1,200 mg (11/3/2023), 1,200 mg (11/17/2023), 1,200 mg (12/1/2023), 1,200 mg (12/15/2023), 1,200 mg (12/29/2023)  encorafenib 75 mg Cap, 300 mg, Oral, Daily, 1 of 1 cycle, Start date: --, End date: --     1/16/2024 -  Chemotherapy    Treatment Summary   Plan Name: OP BEVACIZUMAB Q4W  Treatment Goal: Control  Status: Active  Start Date: 1/16/2024 (Planned)  End Date: 12/3/2024 (Planned)  Provider: Marah Santo MD  Chemotherapy: bevacizumab-awwb (MVASI) 5 mg/kg = 625 mg in sodium chloride 0.9% 100 mL infusion, 5 mg/kg = 625 mg (original dose ), Intravenous, Clinic/[facility] 1 time, 0 of 12 cycles  Dose modification: 5 mg/kg (Cycle 1, Reason: Other (see comments), Comment: Given with Lonsurf)     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   4/3/2020 Initial Diagnosis    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     5/6/2020 - 11/17/2020 Chemotherapy    Treatment Summary   Plan Name: OP FOLFOX 6 Q2W  Treatment Goal: Curative  Status: Inactive  Start Date: 5/6/2020  End Date: 11/6/2020  Provider: Dylan Leyva MD  Chemotherapy: fluorouraciL injection 945 mg, 400 mg/m2 = 945 mg, Intravenous, Clinic/[facility] 1 time, 14 of 14 cycles  Administration: 945 mg (5/6/2020), 945 mg (5/20/2020), 945 mg (6/3/2020), 945 mg (6/17/2020), 945 mg (7/29/2020), 945 mg (8/12/2020), 945 mg (8/26/2020), 945 mg (9/9/2020), 945 mg (9/23/2020), 945 mg (10/6/2020), 945 mg (10/21/2020), 945 mg (11/4/2020)  fluorouraciL 2,400 mg/m2  = 5,665 mg in sodium chloride 0.9% 240 mL chemo infusion, 2,400 mg/m2 = 5,665 mg, Intravenous, Over 46 hours, 14 of 14 cycles  Administration: 5,665 mg (5/6/2020), 5,665 mg (5/20/2020), 5,665 mg (6/3/2020), 5,665 mg (6/17/2020), 5,665 mg (7/29/2020), 5,665 mg (8/12/2020), 5,665 mg (8/26/2020), 5,665 mg (9/9/2020), 5,665 mg (9/23/2020), 5,665 mg (10/6/2020), 5,665 mg (10/21/2020), 5,665 mg (11/4/2020)  leucovorin calcium 900 mg in dextrose 5 % 250 mL infusion, 945 mg, Intravenous, Clinic/[facility] 1 time, 14 of 14 cycles  Administration: 900 mg (5/6/2020), 900 mg (5/20/2020), 900 mg (6/3/2020), 900 mg (6/17/2020), 945 mg (6/30/2020), 945 mg (7/20/2020), 945 mg (7/29/2020), 945 mg (8/12/2020), 945 mg (8/26/2020), 945 mg (9/9/2020), 945 mg (9/23/2020), 945 mg (10/6/2020), 945 mg (10/21/2020), 945 mg (11/4/2020)  oxaliplatin (ELOXATIN) 200 mg in dextrose 5 % 500 mL chemo infusion, 201 mg, Intravenous, Clinic/[facility] 1 time, 6 of 6 cycles  Dose modification: 65 mg/m2 (original dose 85 mg/m2, Cycle 6)  Administration: 200 mg (5/6/2020), 200 mg (5/20/2020), 200 mg (6/3/2020), 200 mg (6/17/2020), 201 mg (6/30/2020), 150 mg (7/20/2020)     6/16/2021 - 8/2/2023 Chemotherapy    Treatment Summary   Plan Name: OP COLORECTAL FOLFIRI + BEVACIZUMAB Q2W  Treatment Goal: Control  Status: Inactive  Start Date: 6/16/2021  End Date: 8/2/2023  Provider: Marah Santo MD  Chemotherapy: fluorouraciL injection 990 mg, 400 mg/m2 = 990 mg, Intravenous, Clinic[facility] 1 time, 15 of 15 cycles  Administration: 990 mg (6/16/2021), 990 mg (6/30/2021), 990 mg (7/14/2021), 980 mg (7/28/2021), 990 mg (8/11/2021), 990 mg (8/25/2021), 990 mg (9/8/2021), 990 mg (9/22/2021), 990 mg (10/6/2021), 990 mg (10/20/2021), 990 mg (11/3/2021), 990 mg (12/1/2021), 990 mg (12/15/2021), 990 mg (11/17/2021), 990 mg (12/28/2021)  fluorouraciL 2,400 mg/m2 = 5,950 mg in sodium chloride 0.9% 240 mL chemo infusion, 2,400 mg/m2 = 5,950 mg, Intravenous, Over 46 hours, 43 of 46  cycles  Administration: 5,950 mg (6/16/2021), 5,950 mg (6/30/2021), 5,950 mg (7/14/2021), 5,880 mg (7/28/2021), 5,930 mg (8/11/2021), 5,930 mg (8/25/2021), 5,930 mg (9/8/2021), 5,930 mg (9/22/2021), 5,930 mg (10/6/2021), 5,930 mg (10/20/2021), 5,930 mg (11/3/2021), 5,930 mg (12/1/2021), 5,930 mg (12/15/2021), 5,930 mg (11/17/2021), 5,930 mg (12/28/2021), 6,050 mg (5/11/2022), 6,025 mg (3/9/2022), 6,025 mg (2/23/2022), 5,930 mg (2/2/2022), 5,930 mg (1/19/2022), 6,050 mg (4/20/2022), 6,025 mg (4/6/2022), 6,025 mg (3/23/2022), 6,050 mg (6/1/2022), 6,050 mg (6/15/2022), 6,050 mg (10/19/2022), 6,050 mg (10/5/2022), 6,050 mg (11/2/2022), 6,050 mg (11/16/2022), 6,050 mg (11/30/2022), 6,050 mg (1/4/2023), 6,050 mg (12/19/2022), 6,050 mg (1/18/2023), 6,000 mg (5/22/2023), 6,000 mg (4/26/2023), 6,000 mg (4/11/2023), 6,000 mg (2/28/2023), 6,050 mg (2/14/2023), 6,000 mg (2/1/2023), 6,000 mg (7/5/2023), 6,000 mg (6/19/2023), 6,000 mg (6/5/2023), 6,000 mg (7/31/2023)  bevacizumab (AVASTIN) 600 mg in sodium chloride 0.9% 100 mL chemo infusion, 660 mg, Intravenous, [facility] 1 time, 36 of 36 cycles  Dose modification: 5 mg/kg (original dose 5 mg/kg, Cycle 26, Reason: MD Discretion, Comment: 5 mg/kg original dose)  Administration: 600 mg (6/30/2021), 600 mg (7/14/2021), 600 mg (7/28/2021), 600 mg (6/16/2021), 600 mg (8/11/2021), 655 mg (8/25/2021), 600 mg (9/8/2021), 600 mg (9/22/2021), 600 mg (10/6/2021), 600 mg (10/20/2021), 600 mg (11/3/2021), 655 mg (12/1/2021), 600 mg (12/15/2021), 600 mg (11/17/2021), 600 mg (12/28/2021), 600 mg (5/11/2022), 600 mg (3/9/2022), 600 mg (2/23/2022), 600 mg (2/2/2022), 600 mg (1/19/2022), 600 mg (4/20/2022), 680 mg (4/6/2022), 600 mg (3/23/2022), 680 mg (10/5/2022), 680 mg (10/19/2022), 680 mg (11/2/2022), 680 mg (11/16/2022), 680 mg (11/30/2022), 680 mg (1/4/2023), 680 mg (12/19/2022), 680 mg (1/18/2023), 680 mg (3/28/2023), 680 mg (3/14/2023), 680 mg (2/28/2023), 680 mg (2/14/2023), 680 mg  (2/1/2023)  irinotecan (CAMPTOSAR) 440 mg in sodium chloride 0.9% 500 mL chemo infusion, 446 mg, Intravenous, [facility] 1 time, 45 of 48 cycles  Dose modification: 180 mg/m2 (original dose 180 mg/m2, Cycle 24)  Administration: 440 mg (6/16/2021), 440 mg (6/30/2021), 440 mg (7/14/2021), 440 mg (7/28/2021), 440 mg (8/11/2021), 444 mg (8/25/2021), 440 mg (9/8/2021), 440 mg (9/22/2021), 440 mg (10/6/2021), 440 mg (10/20/2021), 440 mg (11/3/2021), 440 mg (12/1/2021), 440 mg (12/15/2021), 440 mg (11/17/2021), 440 mg (12/28/2021), 440 mg (5/11/2022), 440 mg (3/9/2022), 440 mg (2/23/2022), 440 mg (2/2/2022), 440 mg (1/19/2022), 440 mg (4/20/2022), 440 mg (4/6/2022), 440 mg (3/24/2022), 440 mg (6/1/2022), 440 mg (6/15/2022), 440 mg (10/19/2022), 440 mg (10/5/2022), 440 mg (11/2/2022), 440 mg (11/16/2022), 440 mg (11/30/2022), 440 mg (1/4/2023), 440 mg (12/19/2022), 440 mg (1/18/2023), 440 mg (5/22/2023), 440 mg (4/26/2023), 440 mg (4/11/2023), 440 mg (3/28/2023), 440 mg (3/14/2023), 440 mg (2/28/2023), 440 mg (2/14/2023), 440 mg (2/1/2023), 440 mg (7/5/2023), 440 mg (6/19/2023), 440 mg (6/5/2023), 440 mg (7/31/2023)  bevacizumab-awwb (MVASI) 5 mg/kg = 680 mg in sodium chloride 0.9% 100 mL infusion, 5 mg/kg = 680 mg (100 % of original dose 5 mg/kg), Intravenous, Clinic/[facility] 1 time, 7 of 10 cycles  Dose modification: 5 mg/kg (original dose 5 mg/kg, Cycle 39)  Administration: 680 mg (4/11/2023), 680 mg (4/26/2023), 680 mg (5/22/2023), 680 mg (7/5/2023), 680 mg (6/19/2023), 680 mg (6/5/2023), 680 mg (7/31/2023)     8/17/2023 - 8/18/2023 Chemotherapy    Treatment Summary   Plan Name: OP CETUXIMAB 500MG Q2W  Treatment Goal: Control  Status: Inactive  Start Date:   End Date:   Provider: Marah Santo MD  Chemotherapy: [No matching medication found in this treatment plan]     8/24/2023 - 12/29/2023 Chemotherapy    Treatment Summary   Plan Name: [facility] - ARM 1 ENCORAFENIB  CETUXIMAB NIVOLUMAB  Treatment Goal: Palliative  Status:  Inactive  Start Date: 8/24/2023  End Date: 12/29/2023  Provider: Marah Santo MD  Chemotherapy: cetuximab (ERBITUX) 100 mg/50 mL chemo infusion 1,200 mg, 1,230 mg, Intravenous, Clinic/[facility] 1 time, 5 of 12 cycles  Administration: 1,200 mg (8/24/2023), 1,200 mg (9/8/2023), 1,200 mg (9/22/2023), 1,200 mg (10/6/2023), 1,200 mg (10/20/2023), 1,200 mg (11/3/2023), 1,200 mg (11/17/2023), 1,200 mg (12/1/2023), 1,200 mg (12/15/2023), 1,200 mg (12/29/2023)  encorafenib 75 mg Cap, 300 mg, Oral, Daily, 1 of 1 cycle, Start date: --, End date: --     1/16/2024 -  Chemotherapy    Treatment Summary   Plan Name: OP BEVACIZUMAB Q4W  Treatment Goal: Control  Status: Active  Start Date: 1/16/2024 (Planned)  End Date: 12/3/2024 (Planned)  Provider: Marah Santo MD  Chemotherapy: bevacizumab-awwb (MVASI) 5 mg/kg = 625 mg in sodium chloride 0.9% 100 mL infusion, 5 mg/kg = 625 mg (original dose ), Intravenous, Clinic/[facility] 1 time, 0 of 12 cycles  Dose modification: 5 mg/kg (Cycle 1, Reason: Other (see comments), Comment: Given with Lonsurf)         Objective:  Gail Mckinnon arrived 15 minutes late for the session.  Ms. Mckinnon was independently ambulatory at the time of session. The patient was fully cooperative throughout the session.  Appearance: age appropriate, appropriately  dressed, adequately  groomed  Behavior/Cooperation: friendly and cooperative  Speech: normal in rate, volume, and tone  Mood: angry, dysthymic  Affect: mood congruent  Thought Process: goal-directed, logical  Thought Content: normal,  No delusions or paranoia; did not appear to be responding to internal stimuli during the session  Orientation: grossly intact  Memory: grossly intact  Attention Span/Concentration: Attends to session without distraction; reports no difficulty  Fund of Knowledge: average  Estimate of Intelligence: average from verbal skills and history  Cognition: grossly intact  Insight: patient has awareness of illness; good insight  "into own behavior and behavior of others  Judgment: the patient's behavior is adequate to circumstances    NCCN Distress thermometer:       2/6/2024     9:34 AM 1/25/2024     2:30 PM 1/22/2024    11:16 AM 1/11/2024     1:38 PM 1/9/2024    11:01 AM 12/29/2023     8:58 AM 12/10/2023    10:44 PM   DISTRESS SCREENING   Distress Score 5 6 7 6 0 - No Distress 4 5   Practical Concerns Treatment decisions Finances;Treatment decisions None of these None of these None of these None of these Insurance/Financial;Treatment Decisions   Social Concerns None of these None of these None of these None of these None of these None of these Dealing with Children   Emotional Concerns None of these Worry or anxiety;Sadness or depression;Loss of interest or enjoyment;Fear Sadness or depression;Worry or anxiety;Grief or loss;Loss of interest or enjoyment;Fear Sadness or depression None of these None of these Depression;Sadness;Worry   Retire Spiritual or Worship Concerns       No   Spiritual or Worship Concerns None of these None of these None of these None of these None of these None of these    Physical Concerns None of these Pain;Fatigue  Fatigue None of these Fatigue Fatigue;Feeling Swollen   Other Problems    "A little emotional recently"  Fatigue           2/8/2024     1:49 PM   PHQ9   Little interest or pleasure in doing things 1   Feeling down, depressed, or hopeless 2   Trouble falling or staying asleep, or sleeping too much 1   Feeling tired or having little energy 1   Poor appetite or overeating 1   Feeling bad about yourself - or that you are a failure or have let yourself or your family down 1   Trouble concentrating on things, such as reading the newspaper or watching television 2   Moving or speaking so slowly that other people could have noticed. Or the opposite - being so fidgety or restless that you have been moving around a lot more than usual 1   Thoughts that you would be better off dead, or of hurting yourself in " some way 0   If you checked off any problems, how difficult have these problems made it for you to do your work, take care of things at home, or get along with other people? Somewhat difficult   PHQ-9 Total Score 10         2/8/2024     1:50 PM 11/29/2023    12:00 AM 10/18/2023     9:49 AM   GAD7   1. Feeling nervous, anxious, or on edge? 2 1 1   2. Not being able to stop or control worrying? 2 1 1   3. Worrying too much about different things? 1 1 1   4. Trouble relaxing? 1 1 1   5. Being so restless that it is hard to sit still? 1 1 0   6. Becoming easily annoyed or irritable? 1 1 1   7. Feeling afraid as if something awful might happen? 2 1 1   8. If you checked off any problems, how difficult have these problems made it for you to do your work, take care of things at home, or get along with other people? 1     TOMAS-7 Score 10 7 6       Interval history and content of current session:  Discussed diagnosis, treatment, prognosis, current adaptation to disease and treatment status, and family's adaptation to disease and treatment status. Reports to be coping with moderate difficulty. Ms. Mckinnon' children have been helping her more around the house and attempting to provide her with emotional support. Ms. Mckinnon reported feeling numb and taking it day by day. She noted a desire to plan for the future but she did not want to discuss this at this time. She was informed of the fact there is no timeline or correct way for her to feel. She has a desire to engage in her normal routine (back from when she was working and did not have cancer). Ms. Mckinnon was encouraged to identify what tasks feel normal for her and to engage in theses tasks if she is able. She voiced a desire to start journaling again and engaging in quiet time. She was encouraged to set a timer for 10 minutes to engage in these activities daily.      Risk parameters:   Patient reports no suicidal ideation  Patient reports no homicidal ideation  Patient  reports no self-injurious behavior  Patient reports no violent behavior   Safety needs:  None at this time      Verbal deficits: None     Patient's response to intervention:The patient's response to intervention is accepting.     Progress toward goals and other mental status changes:  The patient's progress toward goals is fair .      Progress to date:Progress - Ongoing, but Slow      Goals from last visit: Attempted, partially met        Patient Strengths: verbal, intelligent, successful, good social support, good insight, commitment to wellness, strong miah      Treatment Plan:individual psychotherapy  Target symptoms: depression, anxiety  Why chosen therapy is appropriate versus another modality: patient responds to this modality  Outcome monitoring methods: self-report  Therapeutic intervention type: supportive psychotherapy  Prognosis: Fair      Behavioral goals:    Stress management: continue to request help from her children, journal or quiet time for 10 minutes a day   Social engagement: continue seeking social support as necessary, she will be attending a social event next week     Return to clinic: 4 weeks     Length of Service (minutes direct face-to-face contact): 60 minutes    Diagnosis:     ICD-10-CM ICD-9-CM   1. Adjustment disorder with mixed anxiety and depressed mood  F43.23 309.28                       Myla Gallagher, PhD  Clinical Health Psychology Fellow

## 2024-02-08 NOTE — PROGRESS NOTES
Gail Mckinnon MRN [id number]  PID# [id number]     Protocol: SWOG   IRB#: 2023.047  : NGUYEN Degroot MD  Treating Investigator: EJSUS Santo MD     Randomized Phase II Trial of Encorafenib and Cetuximab with or Without Nivolumab (NSC #[id number]) for Patients with Previously Treated, Microsatellite Stable BRAF V600E Metastatic and/or Unresectable Colorectal Cancer   Cycle 1 Day 15 Arm 1/ Nivolumab + Cetuximab + Encorafenib therapy.    February 8th, 2024:     Documentation for 30 days post discontinuation of protocol therapy.    Per protocol 8.7.e, Late Phase 2 and Phase 3 Studies: Expedited Reporting Requirements for Adverse Events that Occur on Studies under a CTEP IND within 30 Days of the Last Administration of the Investigational Agent/Intervention1 Nivolumab (Arm 1).  The patient's final treatment date (C5 Day 15) was 12/29/2024. Per RAVPIERCE, AEs will be entered day 30 following last dose of protocol treatment. ([id number]).     The patient was previously removed from study protocol on 11DEC2024 due to progression of disease. Date of progression documented as 04DEC2024.      The patient presented to the planned office visit alone. The patient was alert, oriented, without noted distress, with appropriate mood and affect for the situation, and freely agreed to continued participation in the surveillance part of the referenced study. The patient was seen by the treating MD today for evaluation, review, and plan of non-protocol therapy of Lonsurf and Avastin.      ConMed list: reviewed with the patient for accuracy- see shadow chart.   The patient brought the last shipment of Encorafenib (protocol therapy) from [facility] to the office. The medication (order# [id number]/ Shipped 01/10/2024) was in a sealed [employer] shipping container addressed to the patient. This CRC took the Encorafenib to the [email or web address] 3rd-floor pharmacy for disposal.       Baseline AEs:  See shadow chart for full list of Baseline  AEs.      Baseline Hypothyroidism- Grade 2 (Start date 2012-continues) Endocrinology following TSH for patient's baseline Grade II Hypothyroidism (resulted from treatment for Hyperthyroidism).   See communication from study chair regarding TSH monitoring for this patient in Cycle 1 section of shadow chart. Additional source documentation in Cycle 4 Day 1 shadow chart and linked to [id number] [facility] note.     Baseline Fatigue- Grade 1- continues- pt reports being able to perform all ADLS. The treating MD is aware, does not deem as CS, no new orders given and continue to monitor.    Baseline Hypercalcemia-Grade 1 continues- corrected calcium calculation 11.70 mg/dL. Endocrinologist monitoring as related to the patients BASELINE Hyperparathyroidism. Per Dr. Moss's office, no treatment or intervention currently. The treating MD is aware does not deem CS, no change in Grade, no new orders, will continue to monitor.     Baseline Hyperparathyroidism- Grade 2 Continues (Start date 2021-Continues) The patient continues to be followed by her endocrinologist with planned follow-up. The treating MD is aware, does not deem CS, no new orders given.      AEs:   See shadow chart for full list of AEs.  Per protocol 8.7.e, Late Phase 2 and Phase 3 Studies: Expedited Reporting Requirements for Adverse Events that Occur on Studies under a CTEP IND within 30 Days of the Last Administration of the Investigational Agent/Intervention1 Nivolumab (Arm 1).  The patient's final treatment date (C5 Day 15) was 12/29/2024. Per LAST, AEs will be entered day 30 following last dose of protocol treatment. ([id number]).     Rash-Maculo-papular Grade 1 Start date 8/28/2023- Ended 01/31/2024.     Insomnia- Grade 2 (Start date 9/12/2023-continues) The treating MD deems as unlikely d/t to protocol therapy, and does not deem as CS, no new orders given.     Vitamin D, 25 Hydroxy-Insufficiency (Start 10/04/2023-continues) ordered per endocrinologist; reported  less than normal limits-endocrine following; No CTCAE grading found. The treating MD does not relate to therapy, does not deem as CS, no new orders given. Ergocalciferol prescribed and patient admits to starting medication on 31OCT2023, and reports taking last dose on 01/24/2024. Patient verbalizes that she needs to continue with OTC Vitamin D 2000 IU daily.  Currently next appointment with endocrinologist is scheduled for [id number].      Tooth Infection-Grade 2 (Start date 01/16/2024- Ended 01/24/2024). [facility] dentist recommends extraction.  The patient is planning to have extraction prior to start of Avastin.     Pain- Grade 1 (Start date ~01/01/2024-Continues)-Grade 1 Pt. Reports intermittent mild pain in the bladder area. She reports that she is now taking PRN medications intermittently. The treating MD is aware, does not deem related to protocol therapy, and not CS. No new orders given.     Investigations-Other, Iron Deficiency Grade 1 (Start date 01/11/2024- continues) Diagnosis of Iron Deficiency. The treating MD is aware, does not deem as CS, and deems attribution to protocol therapy as unlikely. The treating MD deems continued use of OTC Slow Fe+ as appropriate.     Surgical and medical procedures - Other, specify GRADE 2- (Start date 01/16/2024- End date- 01/16/2024) Wide Local Excision Performed 1/16/2024 by Dr. Her ([facility]). The patient was seen by surgeon for post-op visit on 02/06/2024.      Pancreatitis- Grade 3 (Start date 01/27/2024- End date- 01/30/2024) See 01/30/2024 CRC note by Nany Moura. The patient received IV SoluCortef while in-patient. Currently patient continues taking prescribed oral Prednisone. The treating MD is aware, no new orders given, and  deems the end date of pancreatitis as when Lipase level normalized. 01/30/2024.    Anemia- Grade 1 (01/28/2024-Ended 02/08/2024) The treating MD is aware, does not deem CS, does not deem relate to protocol therapy, and no new orders given.  The treating MD deems continued use of OTC Slow Fe+ as appropriate.      /83 Pulse 76 Temp 96.8°F (Temporal) Resp 18  Wt. 121.6 kg (268 lb 1.3 oz) SpO2 98%   BSA 2.38 m² Pain Sc 6 (per intake documentation)    Per conversation with Dr. Santo and Ms. Mckinnon, the patient is planning to begin Cycle 1 Day 1 of oral Lonsurf on 02/12/2024 (days 1 to 5 and days 8 to 12 of a 28-day cycle), and  then plan on beginning Avastin  with cycle two, once extraction of teeth completed.      This CRC answered the patient's questions concerning next follow-up with CRC, surveillance for the  clinical trial, waitlist for NTX study by early phase staff at [facility], and offered the patient support and encouragement. The patient denied having any additional questions and freely agreed to continue with protocol surveillance. The patient was discharged ambulatory without noted distress.       Per early phase staff, the patient is on the waitlist for a slot for NTX study-see printed documentation behind this note.    Will continue to follow patient  for skin toxicity evaluations by the treating MD for the next six months (through July 2024).  Per study calendar, physical and skin assessments to continue up to 6 months, and all acute AEs resolve.      CHANDRIKA Adams RN

## 2024-02-08 NOTE — PROGRESS NOTES
PROGRESS NOTE    Subjective:       Patient ID: Gail Mckinnon is a 53 y.o. female.  MRN: [id number]  : 1970    Chief Complaint: Metastatic colon cancer     History of Present Illness:   Gail Mckinnon is a 53 y.o. female who presents with colon cancer, initially stage III and now with LN recurrence.    On FOLIRI+ Avastin sine .     She was briefly switched to xeloda due to 5 FU shortage for a month. Did not tolerate Xeloda well due hand foot syndrome, blistering of feet, did not take the last day of Xeloda. Completed .     Resumed Folfiri + Avastin on 23. Atropine was held due to prior constipation.     Was admitted to the hospital from -23 for intractable nausea , vomiting and diarrhea as well as abdominal pain. No hematochezia or dark stool was noted.      US showed gallbladder stone and dilated CBD. She was managed conservatively. Had CT abd, pelvis, colonoscopy and MRCP that were relatively unremarkable without signs of cholecystitis. Cholecystectomy was not recommended.     She had recently resumed FOLFIRI and the symptoms started after the first cycle of resuming, never had issues prior to this.  Stayed in the hospital for 10 days.  C diff was negative. No other etiology was established. Symptoms improved over time and she was discharged on .     Interim history:  Enrolled in  SWOG 2107 (encorafenib/cetuximab and randomized to the Nivolumab arm),      Staging scans are done as per research protocol first completed on 10/9, consistent with response to treatment. Most recent repeat scans . Porgression vs pseudo progression.   We discussed her follow up scans, images reviewed at Tumor board, PET scan was recommended and completed. Four week interval scanning was recommended. Continued progression needed and she was taken off trial.      24  S/p excision of the vulvar mass, pathology consistent with pyoderma  gangrenosum. Has some local pain and itching.   She developed gum swelling again and was prescribed  cefuroxime again with improvement and some bleeding. Will be seeing the dentist on Wednesday.       2/8/24  Was hospitalized for acute pancreatitic about 6 days. Gall bladder vs immune therapy related. Started on steroids , on a weekly taper, on 40 mg this week.   Had had a dental abscess, bothersome, needs to be extracted but her insurance does not cover it.   Has not started Lonsurf yet. Avastin has been on hold on anticipation of dental work. Off antibiotics. Will start Lonsurf on Monday       Oncology History:  She completed adjuvant FOLFOX in November 2020. She tolerated only 4 cycles of FOLFOX before she developed an infusion reaction to oxaliplatin in cycle 5 was well as cycle 6. She then completed 6 cycles of infusional 5 FU.     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 She was offered second line therapy with FOLFIRI and Avastin.      She presented to the ED on 11/26 with left flank pain. CT renal stone study showed Moderate hydronephrosis on the left secondary to 3 mm calculus at the UPJ.    She had a PET scan mid April that showed possible progression of her disease with      She has been to [facility] for another opinion. No change was recommended in systemic therapy but she was offered surgical removal of the aorta caval nodes.  Recent sans at [facility]  show stable disease.      Surgery done 7/12/22. Received 2 more cycle of FOLFIRI without Avastin.     She had repeat imaging at [facility] on 9/24/22 that unfortunately showed disease progression since her surgery with multiple RP nodes and one mediastinal node.       PET 12/8/22  Impression:     1. Progression of disease with interval increase of abdominal and pelvic lymphadenopathy.  2. New area of moderate FDG uptake at the right half of the T9 vertebral body (image 124).  Favor degenerative disc changes.  No underlying osteolytic or  osteoblastic lesion.  Recommend continued attention on follow-up.  3. No other evidence of FDG avid disease.     12/22/22  Impression After scan comparison:     1. Metastatic portacaval and left periaortic retroperitoneal lymph nodes which remain stable between the CT of 09/24/2022 and the subsequent PET-CT of 12/08/2022.  There is no evidence of progression of metastatic disease.  2. Nonobstructing bilateral renal calculi and cholelithiasis.    2/10/22:  CT chest abd pelvis  Impression:     Stable periportal, retroperitoneal and mesenteric lymphadenopathy compared to PET-CT 12/08/2022.     Stable right middle lobe 3 mm pulmonary nodule.  No new or enlarging pulmonary nodule.     Hepatic steatosis.  Hepatosplenomegaly.     Cholelithiasis.     Bilateral nonobstructing nephrolithiasis.     Small hiatal hernia with retained contrast in the distal esophagus.  Correlate for reflux.    She had virtual visit at [facility] on 2/17/23.     She has continued FOLFIRI and Avastin.     CT abd pelvis   5/7/23  Impression:     1. Mesenteric inflammatory changes have worsened since the prior study.  2. Mesenteric, retroperitoneal and left-sided pelvic enlarged lymph nodes are unchanged.     8/11/23  CT chest abd pelvis   Impression:     1. Patient with a history of colon adenocarcinoma with worsening periaortic, retroperitoneal, mesenteric, and iliac chain lymphadenopathy the in the abdomen and pelvis when compared with the previous study suggesting worsening metastatic disease.  2. Moderate left hydronephrosis and proximal left hydroureter to the level of possible retroperitoneal scarring.  Invasion/compression of the left ureter is not excluded.  3. Bilateral nephrolithiasis  4. Hepatomegaly and hepatic steatosis  5. Two tiny < 5 mm pulmonary nodules within the chest, unchanged.  No new pulmonary nodules.    10/9/23  CT chest abd pelvis     Impression:     Decreased size of retroperitoneal and mesenteric lymph nodes.  Stable left  external iliac lymph node. Right upper lobe pulmonary nodule appears slightly increased in size.  No new pulmonary nodules.  Findings suggest mixed/partial response to therapy.     Cholelithiasis.     Hepatosplenomegaly.     Small pericardial effusion.     RECIST SUMMARY:     Date of prior examination for comparison: 08/11/2023     Lesion 1: Retroperitoneal soft tissue nodule but in the duodenum.  1.7 cm. Series 3 image 93.  Prior measurement 82.1 cm.     Lesion 2: Celiac axis lymph node.  1.5 cm. Series 3 image 56.  Prior measurement 1.8 cm.     Lesion 3: Portacaval lymph node.  1.0 cm. Series 3 image 59.  Prior measurement 1.2 cm.     Lesion 4: Mesenteric lymph node.  1.2 cm. Series 3 image 105.  Prior measurement 1.6 cm.     12/4/23 CT chest abd pelvis  Impression:     1. Stable and mildly enlarged mediastinal, retroperitoneal, mesenteric, and left pelvic lymph nodes, as well as interval development of new nodular soft tissue densities along the left pericolic gutter worrisome for tumoral deposits.  2. Interval slight increase in size several solid pulmonary nodules.  No new pulmonary nodule.  3. New small area of nodular wall thickening along the posterosuperior aspect of the urinary bladder, nonspecific and possibly related to adjacent fibrosis/scarring, with small tumoral deposit not excluded.  Consider correlation with cystoscopy.  4. Bilateral nonobstructive nephrolithiasis.  Mild left hydronephrosis.  5. Multiple additional findings, as noted above.  RECIST SUMMARY:     Date of prior examination for comparison: 10/09/2023     Lesion 1: Retroperitoneal soft tissue nodule abutting the duodenum.  1.3 cm. Series 102 image 155.  Prior measurement 1.3 cm.     Lesion 2: Celiac axis lymph node.  1.5 cm. Series 102 image 82.  Prior measurement 1.5 cm.     Lesion 3: Portacaval lymph node.  1.2 cm. Series 102 image 92.  Prior measurement 1.2 cm.     Lesion 4: Mesenteric lymph node.  1.6 cm. Series 102 image 177.   Prior measurement 1.2 cm.    12/13/23  PET     Impression:     Progression of disease with multiple new and enlarging lymph nodes throughout the pelvis and mesentery along with new metastatic FDG avid mediastinal adenopathy as above.  Oncology History:  Oncology History   Malignant neoplasm of sigmoid colon   3/16/2020 Initial Diagnosis    Malignant neoplasm of sigmoid colon     3/31/2020 Cancer Staged    Staging form: Colon and Rectum, AJCC 8th Edition  - Clinical stage from 3/31/2020: Stage IIIC (cT4b, cN2a, cM0)     5/6/2020 - 11/17/2020 Chemotherapy    Treatment Summary   Plan Name: OP FOLFOX 6 Q2W  Treatment Goal: Curative  Status: Inactive  Start Date: 5/6/2020  End Date: 11/6/2020  Provider: Dylan Leyva MD  Chemotherapy: fluorouraciL injection 945 mg, 400 mg/m2 = 945 mg, Intravenous, Clinic/[facility] 1 time, 14 of 14 cycles  Administration: 945 mg (5/6/2020), 945 mg (5/20/2020), 945 mg (6/3/2020), 945 mg (6/17/2020), 945 mg (7/29/2020), 945 mg (8/12/2020), 945 mg (8/26/2020), 945 mg (9/9/2020), 945 mg (9/23/2020), 945 mg (10/6/2020), 945 mg (10/21/2020), 945 mg (11/4/2020)  fluorouraciL 2,400 mg/m2 = 5,665 mg in sodium chloride 0.9% 240 mL chemo infusion, 2,400 mg/m2 = 5,665 mg, Intravenous, Over 46 hours, 14 of 14 cycles  Administration: 5,665 mg (5/6/2020), 5,665 mg (5/20/2020), 5,665 mg (6/3/2020), 5,665 mg (6/17/2020), 5,665 mg (7/29/2020), 5,665 mg (8/12/2020), 5,665 mg (8/26/2020), 5,665 mg (9/9/2020), 5,665 mg (9/23/2020), 5,665 mg (10/6/2020), 5,665 mg (10/21/2020), 5,665 mg (11/4/2020)  leucovorin calcium 900 mg in dextrose 5 % 250 mL infusion, 945 mg, Intravenous, Clinic/[facility] 1 time, 14 of 14 cycles  Administration: 900 mg (5/6/2020), 900 mg (5/20/2020), 900 mg (6/3/2020), 900 mg (6/17/2020), 945 mg (6/30/2020), 945 mg (7/20/2020), 945 mg (7/29/2020), 945 mg (8/12/2020), 945 mg (8/26/2020), 945 mg (9/9/2020), 945 mg (9/23/2020), 945 mg (10/6/2020), 945 mg (10/21/2020), 945 mg (11/4/2020)  oxaliplatin  (ELOXATIN) 200 mg in dextrose 5 % 500 mL chemo infusion, 201 mg, Intravenous, Clinic/[facility] 1 time, 6 of 6 cycles  Dose modification: 65 mg/m2 (original dose 85 mg/m2, Cycle 6)  Administration: 200 mg (5/6/2020), 200 mg (5/20/2020), 200 mg (6/3/2020), 200 mg (6/17/2020), 201 mg (6/30/2020), 150 mg (7/20/2020)     5/3/2021 Cancer Staged    Staging form: Colon and Rectum, AJCC 8th Edition  - Clinical stage from 5/3/2021: Stage Unknown (rcT0, cNX, cM1)     6/16/2021 - 8/2/2023 Chemotherapy    Treatment Summary   Plan Name: OP COLORECTAL FOLFIRI + BEVACIZUMAB Q2W  Treatment Goal: Control  Status: Inactive  Start Date: 6/16/2021  End Date: 8/2/2023  Provider: Marah Santo MD  Chemotherapy: fluorouraciL injection 990 mg, 400 mg/m2 = 990 mg, Intravenous, Clinic/[facility] 1 time, 15 of 15 cycles  Administration: 990 mg (6/16/2021), 990 mg (6/30/2021), 990 mg (7/14/2021), 980 mg (7/28/2021), 990 mg (8/11/2021), 990 mg (8/25/2021), 990 mg (9/8/2021), 990 mg (9/22/2021), 990 mg (10/6/2021), 990 mg (10/20/2021), 990 mg (11/3/2021), 990 mg (12/1/2021), 990 mg (12/15/2021), 990 mg (11/17/2021), 990 mg (12/28/2021)  fluorouraciL 2,400 mg/m2 = 5,950 mg in sodium chloride 0.9% 240 mL chemo infusion, 2,400 mg/m2 = 5,950 mg, Intravenous, Over 46 hours, 43 of 46 cycles  Administration: 5,950 mg (6/16/2021), 5,950 mg (6/30/2021), 5,950 mg (7/14/2021), 5,880 mg (7/28/2021), 5,930 mg (8/11/2021), 5,930 mg (8/25/2021), 5,930 mg (9/8/2021), 5,930 mg (9/22/2021), 5,930 mg (10/6/2021), 5,930 mg (10/20/2021), 5,930 mg (11/3/2021), 5,930 mg (12/1/2021), 5,930 mg (12/15/2021), 5,930 mg (11/17/2021), 5,930 mg (12/28/2021), 6,050 mg (5/11/2022), 6,025 mg (3/9/2022), 6,025 mg (2/23/2022), 5,930 mg (2/2/2022), 5,930 mg (1/19/2022), 6,050 mg (4/20/2022), 6,025 mg (4/6/2022), 6,025 mg (3/23/2022), 6,050 mg (6/1/2022), 6,050 mg (6/15/2022), 6,050 mg (10/19/2022), 6,050 mg (10/5/2022), 6,050 mg (11/2/2022), 6,050 mg (11/16/2022), 6,050 mg (11/30/2022), 6,050  mg (1/4/2023), 6,050 mg (12/19/2022), 6,050 mg (1/18/2023), 6,000 mg (5/22/2023), 6,000 mg (4/26/2023), 6,000 mg (4/11/2023), 6,000 mg (2/28/2023), 6,050 mg (2/14/2023), 6,000 mg (2/1/2023), 6,000 mg (7/5/2023), 6,000 mg (6/19/2023), 6,000 mg (6/5/2023), 6,000 mg (7/31/2023)  bevacizumab (AVASTIN) 600 mg in sodium chloride 0.9% 100 mL chemo infusion, 660 mg, Intravenous, [facility] [place] 1 time, 36 of 36 cycles  Dose modification: 5 mg/kg (original dose 5 mg/kg, Cycle 26, Reason: MD Discretion, Comment: 5 mg/kg original dose)  Administration: 600 mg (6/30/2021), 600 mg (7/14/2021), 600 mg (7/28/2021), 600 mg (6/16/2021), 600 mg (8/11/2021), 655 mg (8/25/2021), 600 mg (9/8/2021), 600 mg (9/22/2021), 600 mg (10/6/2021), 600 mg (10/20/2021), 600 mg (11/3/2021), 655 mg (12/1/2021), 600 mg (12/15/2021), 600 mg (11/17/2021), 600 mg (12/28/2021), 600 mg (5/11/2022), 600 mg (3/9/2022), 600 mg (2/23/2022), 600 mg (2/2/2022), 600 mg (1/19/2022), 600 mg (4/20/2022), 680 mg (4/6/2022), 600 mg (3/23/2022), 680 mg (10/5/2022), 680 mg (10/19/2022), 680 mg (11/2/2022), 680 mg (11/16/2022), 680 mg (11/30/2022), 680 mg (1/4/2023), 680 mg (12/19/2022), 680 mg (1/18/2023), 680 mg (3/28/2023), 680 mg (3/14/2023), 680 mg (2/28/2023), 680 mg (2/14/2023), 680 mg (2/1/2023)  irinotecan (CAMPTOSAR) 440 mg in sodium chloride 0.9% 500 mL chemo infusion, 446 mg, Intravenous, Clinic/[facility] 1 time, 45 of 48 cycles  Dose modification: 180 mg/m2 (original dose 180 mg/m2, Cycle 24)  Administration: 440 mg (6/16/2021), 440 mg (6/30/2021), 440 mg (7/14/2021), 440 mg (7/28/2021), 440 mg (8/11/2021), 444 mg (8/25/2021), 440 mg (9/8/2021), 440 mg (9/22/2021), 440 mg (10/6/2021), 440 mg (10/20/2021), 440 mg (11/3/2021), 440 mg (12/1/2021), 440 mg (12/15/2021), 440 mg (11/17/2021), 440 mg (12/28/2021), 440 mg (5/11/2022), 440 mg (3/9/2022), 440 mg (2/23/2022), 440 mg (2/2/2022), 440 mg (1/19/2022), 440 mg (4/20/2022), 440 mg (4/6/2022), 440 mg (3/24/2022), 440 mg  (6/1/2022), 440 mg (6/15/2022), 440 mg (10/19/2022), 440 mg (10/5/2022), 440 mg (11/2/2022), 440 mg (11/16/2022), 440 mg (11/30/2022), 440 mg (1/4/2023), 440 mg (12/19/2022), 440 mg (1/18/2023), 440 mg (5/22/2023), 440 mg (4/26/2023), 440 mg (4/11/2023), 440 mg (3/28/2023), 440 mg (3/14/2023), 440 mg (2/28/2023), 440 mg (2/14/2023), 440 mg (2/1/2023), 440 mg (7/5/2023), 440 mg (6/19/2023), 440 mg (6/5/2023), 440 mg (7/31/2023)  bevacizumab-awwb (MVASI) 5 mg/kg = 680 mg in sodium chloride 0.9% 100 mL infusion, 5 mg/kg = 680 mg (100 % of original dose 5 mg/kg), Intravenous, [facility] time, 7 of 10 cycles  Dose modification: 5 mg/kg (original dose 5 mg/kg, Cycle 39)  Administration: 680 mg (4/11/2023), 680 mg (4/26/2023), 680 mg (5/22/2023), 680 mg (7/5/2023), 680 mg (6/19/2023), 680 mg (6/5/2023), 680 mg (7/31/2023)     8/17/2023 - 8/18/2023 Chemotherapy    Treatment Summary   Plan Name: OP CETUXIMAB 500MG Q2W  Treatment Goal: Control  Status: Inactive  Start Date:   End Date:   Provider: Marah Santo MD  Chemotherapy: [No matching medication found in this treatment plan]     8/24/2023 - 12/29/2023 Chemotherapy    Treatment Summary   Plan Name: [facility] - ARM 1 ENCORAFENIB  CETUXIMAB NIVOLUMAB  Treatment Goal: Palliative  Status: Inactive  Start Date: 8/24/2023  End Date: 12/29/2023  Provider: Marah Santo MD  Chemotherapy: cetuximab (ERBITUX) 100 mg/50 mL chemo infusion 1,200 mg, 1,230 mg, Intravenous, [facility] [place] [facility] time, 5 of 12 cycles  Administration: 1,200 mg (8/24/2023), 1,200 mg (9/8/2023), 1,200 mg (9/22/2023), 1,200 mg (10/6/2023), 1,200 mg (10/20/2023), 1,200 mg (11/3/2023), 1,200 mg (11/17/2023), 1,200 mg (12/1/2023), 1,200 mg (12/15/2023), 1,200 mg (12/29/2023)  encorafenib 75 mg Cap, 300 mg, Oral, Daily, 1 of 1 cycle, Start date: --, End date: --     1/16/2024 -  Chemotherapy    Treatment Summary   Plan Name: OP BEVACIZUMAB Q4W  Treatment Goal: Control  Status: Active  Start Date: 1/16/2024  (Planned)  End Date: 12/3/2024 (Planned)  Provider: Marah Santo MD  Chemotherapy: bevacizumab-awwb (MVASI) 5 mg/kg = 625 mg in sodium chloride 0.9% 100 mL infusion, 5 mg/kg = 625 mg (original dose ), Intravenous, Clinic/[facility] 1 time, 0 of 12 cycles  Dose modification: 5 mg/kg (Cycle 1, Reason: Other (see comments), Comment: Given with Lonsurf)     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   4/3/2020 Initial Diagnosis    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     5/6/2020 - 11/17/2020 Chemotherapy    Treatment Summary   Plan Name: OP FOLFOX 6 Q2W  Treatment Goal: Curative  Status: Inactive  Start Date: 5/6/2020  End Date: 11/6/2020  Provider: Dylan Leyva MD  Chemotherapy: fluorouraciL injection 945 mg, 400 mg/m2 = 945 mg, Intravenous, Clinic/[facility] 1 time, 14 of 14 cycles  Administration: 945 mg (5/6/2020), 945 mg (5/20/2020), 945 mg (6/3/2020), 945 mg (6/17/2020), 945 mg (7/29/2020), 945 mg (8/12/2020), 945 mg (8/26/2020), 945 mg (9/9/2020), 945 mg (9/23/2020), 945 mg (10/6/2020), 945 mg (10/21/2020), 945 mg (11/4/2020)  fluorouraciL 2,400 mg/m2 = 5,665 mg in sodium chloride 0.9% 240 mL chemo infusion, 2,400 mg/m2 = 5,665 mg, Intravenous, Over 46 hours, 14 of 14 cycles  Administration: 5,665 mg (5/6/2020), 5,665 mg (5/20/2020), 5,665 mg (6/3/2020), 5,665 mg (6/17/2020), 5,665 mg (7/29/2020), 5,665 mg (8/12/2020), 5,665 mg (8/26/2020), 5,665 mg (9/9/2020), 5,665 mg (9/23/2020), 5,665 mg (10/6/2020), 5,665 mg (10/21/2020), 5,665 mg (11/4/2020)  leucovorin calcium 900 mg in dextrose 5 % 250 mL infusion, 945 mg, Intravenous, Clinic/[facility] 1 time, 14 of 14 cycles  Administration: 900 mg (5/6/2020), 900 mg (5/20/2020), 900 mg (6/3/2020), 900 mg (6/17/2020), 945 mg (6/30/2020), 945 mg (7/20/2020), 945 mg (7/29/2020), 945 mg (8/12/2020), 945 mg (8/26/2020), 945 mg (9/9/2020), 945 mg (9/23/2020), 945 mg (10/6/2020), 945 mg (10/21/2020), 945 mg (11/4/2020)  oxaliplatin (ELOXATIN) 200 mg in dextrose 5 % 500 mL chemo infusion, 201 mg,  Intravenous, Clinic/[facility] 1 time, 6 of 6 cycles  Dose modification: 65 mg/m2 (original dose 85 mg/m2, Cycle 6)  Administration: 200 mg (5/6/2020), 200 mg (5/20/2020), 200 mg (6/3/2020), 200 mg (6/17/2020), 201 mg (6/30/2020), 150 mg (7/20/2020)     6/16/2021 - 8/2/2023 Chemotherapy    Treatment Summary   Plan Name: OP COLORECTAL FOLFIRI + BEVACIZUMAB Q2W  Treatment Goal: Control  Status: Inactive  Start Date: 6/16/2021  End Date: 8/2/2023  Provider: Marah Santo MD  Chemotherapy: fluorouraciL injection 990 mg, 400 mg/m2 = 990 mg, Intravenous, Clinic/[facility] 1 time, 15 of 15 cycles  Administration: 990 mg (6/16/2021), 990 mg (6/30/2021), 990 mg (7/14/2021), 980 mg (7/28/2021), 990 mg (8/11/2021), 990 mg (8/25/2021), 990 mg (9/8/2021), 990 mg (9/22/2021), 990 mg (10/6/2021), 990 mg (10/20/2021), 990 mg (11/3/2021), 990 mg (12/1/2021), 990 mg (12/15/2021), 990 mg (11/17/2021), 990 mg (12/28/2021)  fluorouraciL 2,400 mg/m2 = 5,950 mg in sodium chloride 0.9% 240 mL chemo infusion, 2,400 mg/m2 = 5,950 mg, Intravenous, Over 46 hours, 43 of 46 cycles  Administration: 5,950 mg (6/16/2021), 5,950 mg (6/30/2021), 5,950 mg (7/14/2021), 5,880 mg (7/28/2021), 5,930 mg (8/11/2021), 5,930 mg (8/25/2021), 5,930 mg (9/8/2021), 5,930 mg (9/22/2021), 5,930 mg (10/6/2021), 5,930 mg (10/20/2021), 5,930 mg (11/3/2021), 5,930 mg (12/1/2021), 5,930 mg (12/15/2021), 5,930 mg (11/17/2021), 5,930 mg (12/28/2021), 6,050 mg (5/11/2022), 6,025 mg (3/9/2022), 6,025 mg (2/23/2022), 5,930 mg (2/2/2022), 5,930 mg (1/19/2022), 6,050 mg (4/20/2022), 6,025 mg (4/6/2022), 6,025 mg (3/23/2022), 6,050 mg (6/1/2022), 6,050 mg (6/15/2022), 6,050 mg (10/19/2022), 6,050 mg (10/5/2022), 6,050 mg (11/2/2022), 6,050 mg (11/16/2022), 6,050 mg (11/30/2022), 6,050 mg (1/4/2023), 6,050 mg (12/19/2022), 6,050 mg (1/18/2023), 6,000 mg (5/22/2023), 6,000 mg (4/26/2023), 6,000 mg (4/11/2023), 6,000 mg (2/28/2023), 6,050 mg (2/14/2023), 6,000 mg (2/1/2023), 6,000 mg  (7/5/2023), 6,000 mg (6/19/2023), 6,000 mg (6/5/2023), 6,000 mg (7/31/2023)  bevacizumab (AVASTIN) 600 mg in sodium chloride 0.9% 100 mL chemo infusion, 660 mg, Intravenous, [facility] 1 time, 36 of 36 cycles  Dose modification: 5 mg/kg (original dose 5 mg/kg, Cycle 26, Reason: MD Discretion, Comment: 5 mg/kg original dose)  Administration: 600 mg (6/30/2021), 600 mg (7/14/2021), 600 mg (7/28/2021), 600 mg (6/16/2021), 600 mg (8/11/2021), 655 mg (8/25/2021), 600 mg (9/8/2021), 600 mg (9/22/2021), 600 mg (10/6/2021), 600 mg (10/20/2021), 600 mg (11/3/2021), 655 mg (12/1/2021), 600 mg (12/15/2021), 600 mg (11/17/2021), 600 mg (12/28/2021), 600 mg (5/11/2022), 600 mg (3/9/2022), 600 mg (2/23/2022), 600 mg (2/2/2022), 600 mg (1/19/2022), 600 mg (4/20/2022), 680 mg (4/6/2022), 600 mg (3/23/2022), 680 mg (10/5/2022), 680 mg (10/19/2022), 680 mg (11/2/2022), 680 mg (11/16/2022), 680 mg (11/30/2022), 680 mg (1/4/2023), 680 mg (12/19/2022), 680 mg (1/18/2023), 680 mg (3/28/2023), 680 mg (3/14/2023), 680 mg (2/28/2023), 680 mg (2/14/2023), 680 mg (2/1/2023)  irinotecan (CAMPTOSAR) 440 mg in sodium chloride 0.9% 500 mL chemo infusion, 446 mg, Intravenous, [facility] 1 time, 45 of 48 cycles  Dose modification: 180 mg/m2 (original dose 180 mg/m2, Cycle 24)  Administration: 440 mg (6/16/2021), 440 mg (6/30/2021), 440 mg (7/14/2021), 440 mg (7/28/2021), 440 mg (8/11/2021), 444 mg (8/25/2021), 440 mg (9/8/2021), 440 mg (9/22/2021), 440 mg (10/6/2021), 440 mg (10/20/2021), 440 mg (11/3/2021), 440 mg (12/1/2021), 440 mg (12/15/2021), 440 mg (11/17/2021), 440 mg (12/28/2021), 440 mg (5/11/2022), 440 mg (3/9/2022), 440 mg (2/23/2022), 440 mg (2/2/2022), 440 mg (1/19/2022), 440 mg (4/20/2022), 440 mg (4/6/2022), 440 mg (3/24/2022), 440 mg (6/1/2022), 440 mg (6/15/2022), 440 mg (10/19/2022), 440 mg (10/5/2022), 440 mg (11/2/2022), 440 mg (11/16/2022), 440 mg (11/30/2022), 440 mg (1/4/2023), 440 mg (12/19/2022), 440 mg (1/18/2023), 440 mg  (5/22/2023), 440 mg (4/26/2023), 440 mg (4/11/2023), 440 mg (3/28/2023), 440 mg (3/14/2023), 440 mg (2/28/2023), 440 mg (2/14/2023), 440 mg (2/1/2023), 440 mg (7/5/2023), 440 mg (6/19/2023), 440 mg (6/5/2023), 440 mg (7/31/2023)  bevacizumab-awwb (MVASI) 5 mg/kg = 680 mg in sodium chloride 0.9% 100 mL infusion, 5 mg/kg = 680 mg (100 % of original dose 5 mg/kg), Intravenous, Clinic/[facility] 1 time, 7 of 10 cycles  Dose modification: 5 mg/kg (original dose 5 mg/kg, Cycle 39)  Administration: 680 mg (4/11/2023), 680 mg (4/26/2023), 680 mg (5/22/2023), 680 mg (7/5/2023), 680 mg (6/19/2023), 680 mg (6/5/2023), 680 mg (7/31/2023)     8/17/2023 - 8/18/2023 Chemotherapy    Treatment Summary   Plan Name: OP CETUXIMAB 500MG Q2W  Treatment Goal: Control  Status: Inactive  Start Date:   End Date:   Provider: Marah Santo MD  Chemotherapy: [No matching medication found in this treatment plan]     8/24/2023 - 12/29/2023 Chemotherapy    Treatment Summary   Plan Name: [facility] - ARM 1 ENCORAFENIB  CETUXIMAB NIVOLUMAB  Treatment Goal: Palliative  Status: Inactive  Start Date: 8/24/2023  End Date: 12/29/2023  Provider: Marah Santo MD  Chemotherapy: cetuximab (ERBITUX) 100 mg/50 mL chemo infusion 1,200 mg, 1,230 mg, Intravenous, Clinic/HOD 1 time, 5 of 12 cycles  Administration: 1,200 mg (8/24/2023), 1,200 mg (9/8/2023), 1,200 mg (9/22/2023), 1,200 mg (10/6/2023), 1,200 mg (10/20/2023), 1,200 mg (11/3/2023), 1,200 mg (11/17/2023), 1,200 mg (12/1/2023), 1,200 mg (12/15/2023), 1,200 mg (12/29/2023)  encorafenib 75 mg Cap, 300 mg, Oral, Daily, 1 of 1 cycle, Start date: --, End date: --     1/16/2024 -  Chemotherapy    Treatment Summary   Plan Name: OP BEVACIZUMAB Q4W  Treatment Goal: Control  Status: Active  Start Date: 1/16/2024 (Planned)  End Date: 12/3/2024 (Planned)  Provider: Marah Santo MD  Chemotherapy: bevacizumab-awwb (MVASI) 5 mg/kg = 625 mg in sodium chloride 0.9% 100 mL infusion, 5 mg/kg = 625 mg (original dose ),  Intravenous, Clinic/HOD 1 time, 0 of 12 cycles  Dose modification: 5 mg/kg (Cycle 1, Reason: Other (see comments), Comment: Given with [place])         History:  Past Medical History:   Diagnosis Date    Anxiety     Depression     FH: ovarian cancer 2020    Hx of psychiatric care     Effexor, Paxil, Lexapro, Zoloft, Wellbutrin, Trazodone Buspar    Hypertension     Hyperthyroidism     Hypothyroid     Kidney calculi     Malignant neoplasm of sigmoid colon 2020    Menorrhagia     Multinodular goiter 2012    Palpitation     Psychiatric problem     Venous insufficiency     Vulvar lesion       Past Surgical History:   Procedure Laterality Date     SECTION, CLASSIC      x3    COLONOSCOPY N/A 2020    Procedure: COLONOSCOPY;  Surgeon: Shane Parker MD;  Location: [facility];  Service: Endoscopy;  Laterality: N/A;    COLONOSCOPY N/A 2022    Procedure: COLONOSCOPY;  Surgeon: Shane Parker MD;  Location: [facility];  Service: Endoscopy;  Laterality: N/A;    COLONOSCOPY N/A 2023    Procedure: COLONOSCOPY;  Surgeon: Shane Parker MD;  Location: [facility];  Service: Endoscopy;  Laterality: N/A;  no cecum anastomosis    CYSTOSCOPY W/ URETERAL STENT PLACEMENT Bilateral 2020    Procedure: CYSTOSCOPY, WITH URETERAL STENT INSERTION;  Surgeon: Claudio Tyson MD;  Location: [address];  Service: Urology;  Laterality: Bilateral;    EXAMINATION UNDER ANESTHESIA N/A 2024    Procedure: Exam under anesthesia-vagina;  Surgeon: Eli Her MD;  Location: [facility] OR;  Service: OB/GYN;  Laterality: N/A;    EXCISION-WIDE LOCAL Left 2024    Procedure: EXCISION-WIDE LOCAL -  Labia Majora;  Surgeon: Eli Her MD;  Location: [facility] OR;  Service: OB/GYN;  Laterality: Left;  upper left side of labia majora    EXCISIONAL BIOPSY, LYMPH NODE  2022    abdominal x 4    INSERTION OF TUNNELED CENTRAL VENOUS CATHETER (CVC) WITH SUBCUTANEOUS PORT N/A 2020     Procedure: AYKVRBEOM-DILR-B-CATH;  Surgeon: Stephen Diagnostic Provider;  [facility];  Service: Radiology;  Laterality: N/A;  Room 189/Cindy    LAPAROSCOPIC SIGMOIDECTOMY N/A 03/25/2020    Procedure: COLECTOMY, SIGMOID, LAPAROSCOPIC, flex sig, ERAS high;  Surgeon: Silvio Man MD;  [facility];  Service: Colon and Rectal;  Laterality: N/A;    MOBILIZATION OF SPLENIC FLEXURE  03/25/2020    Procedure: MOBILIZATION, SPLENIC FLEXURE;  Surgeon: Silvio Man MD;  [facility];  Service: Colon and Rectal;;    TONSILLECTOMY      TOTAL ABDOMINAL HYSTERECTOMY W/ BILATERAL SALPINGOOPHORECTOMY N/A 03/25/2020    Procedure: HYSTERECTOMY, TOTAL, ABDOMINAL, WITH BILATERAL SALPINGO-OOPHORECTOMY;  Surgeon: Keron Brady MD;  Location: [facility] OR [address] [place] [address];  Service: Oncology;  Laterality: N/A;     Family History   Problem Relation Age of Onset    Heart disease Father     Diabetes Mother 65    Drug abuse Brother     Drug abuse Brother     Cancer Maternal Aunt         lung cancer    Cancer Maternal Grandmother         stomach cancer- started in ovaries    Ovarian cancer Maternal Grandmother         stomach cancer- started in ovaries    Colon cancer Paternal Uncle     Cancer Paternal Uncle     Ovarian cancer Maternal Aunt     Ovarian cancer Maternal Aunt     Ovarian cancer Cousin         mother had ovarian cancer    Drug abuse Son     Drug abuse Son         clean/sober since 2012    Colon cancer Son     No Known Problems Daughter     Uterine cancer Neg Hx     Breast cancer Neg Hx      Social History     Tobacco Use    Smoking status: Never    Smokeless tobacco: Never   Substance and Sexual Activity    Alcohol use: Yes     Comment: occasionally- twice monthly    Drug use: Never    Sexual activity: Yes     Partners: Male     Birth control/protection: See Surgical Hx        ROS:   Review of Systems   Constitutional:  Positive for malaise/fatigue. Negative for fever and weight loss.  "  HENT:  Negative for congestion, hearing loss, nosebleeds and sore throat.    Eyes:  Negative for double vision and photophobia.   Respiratory:  Negative for cough, hemoptysis, sputum production, shortness of breath and wheezing.    Cardiovascular:  Negative for chest pain, palpitations, orthopnea and leg swelling.   Gastrointestinal:  Positive for abdominal pain, diarrhea, heartburn and nausea. Negative for blood in stool, constipation and vomiting.   Genitourinary:  Positive for frequency. Negative for dysuria, hematuria and urgency.   Musculoskeletal:  Negative for back pain, joint pain and myalgias.   Skin:  Positive for rash. Negative for itching.   Neurological:  Negative for dizziness, tingling, seizures, weakness and headaches.   Endo/Heme/Allergies:  Negative for polydipsia. Does not bruise/bleed easily.   Psychiatric/Behavioral:  Negative for depression and memory loss. The patient is nervous/anxious and has insomnia (improving).         Objective:     Vitals:    02/08/24 1353   BP: 139/83   Pulse: 76   Resp: 18   Temp: 96.9 °F (36.1 °C)   TempSrc: Temporal   SpO2: 98%   Weight: 121.6 kg (268 lb 1.3 oz)   Height: 5' 6" (1.676 m)   PainSc:   6   PainLoc: Groin         Wt Readings from Last 10 Encounters:   02/08/24 121.6 kg (268 lb 1.3 oz)   02/06/24 121.7 kg (268 lb 4.8 oz)   01/28/24 122.5 kg (270 lb 1 oz)   01/22/24 125.8 kg (277 lb 5.4 oz)   01/15/24 124.8 kg (275 lb 2.2 oz)   01/11/24 125.3 kg (276 lb 3.8 oz)   01/09/24 127.7 kg (281 lb 8.4 oz)   01/02/24 128.7 kg (283 lb 11.7 oz)   12/29/23 129.5 kg (285 lb 7.9 oz)   12/29/23 129.5 kg (285 lb 7.9 oz)       Physical Examination:   Physical Exam  Vitals and nursing note reviewed.   Constitutional:       General: She is not in acute distress.     Appearance: She is not diaphoretic.   HENT:      Head: Normocephalic.      Mouth/Throat:      Pharynx: No oropharyngeal exudate.   Eyes:      General: No scleral icterus.     Conjunctiva/sclera: Conjunctivae " normal.   Neck:      Thyroid: No thyromegaly.   Cardiovascular:      Rate and Rhythm: Normal rate and regular rhythm.      Heart sounds: Normal heart sounds. No murmur heard.  Pulmonary:      Effort: Pulmonary effort is normal. No respiratory distress.      Breath sounds: No stridor. No wheezing or rales.   Chest:      Chest wall: No tenderness.   Abdominal:      General: Bowel sounds are normal. There is no distension.      Palpations: Abdomen is soft. There is no mass.      Tenderness: There is no abdominal tenderness. There is no rebound.   Musculoskeletal:         General: No tenderness or deformity. Normal range of motion.      Cervical back: Neck supple.   Lymphadenopathy:      Cervical: No cervical adenopathy.   Skin:     General: Skin is warm and dry.      Findings: Rash (grade 1 facial rash, at baseline) present. No erythema.   Neurological:      Mental Status: She is alert and oriented to person, place, and time.      Cranial Nerves: No cranial nerve deficit.      Coordination: Coordination normal.      Gait: Gait is intact.   Psychiatric:         Mood and Affect: Affect normal.         Cognition and Memory: Memory normal.         Judgment: Judgment normal.          Diagnostic Tests:  Significant Imaging: I have reviewed and interpreted all pertinent imaging results/findings.  Laboratory Data:  All pertinent labs have been reviewed.    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 Oxaliplatin was stopped due to severe adverse reaction.     Follow-up CTs and PET in May 2021 showed enlarging retroperitoneal node, concerning for metastatic disease.      She started FOLFIRI + Avastin and has continued till July 2022.   Given isolated RP toni disease, she has undergone open Left periaortic and aortocaval lymph node dissection on 7/12/2022 at [facility]. Resumed FOLFIRI+ Debo with relatively stable disease but now has disease progression on scans in August 2023.     She has been enrolled into  SWOG 2107 (encorafenib/cetuximab + nivo) , initial scans showed improvement, second scans showed some new lesions, now growing after a short term repeat.  As per Recist calculations, the target lesions are stable. However, there is  POD in the left paracolic gutter, seen on PET scan as well. Will take her off of trial at this time.     Will initiate next line of SOC treatment with Lonsurf and Avastin. Treatment delayed due to recent hospitalization for pancreatitis, likely immune therapy related, improving on steroids. On a taper. Can start Lonsurf on 2/12  Hold avastin if any invasive dental procedures are recommended.   Look for early phase trials, will follow up with PTCP team.  Will also look into options at [facility]. She has seen Dr. Montelongo previously and I have discussed the plan with him and he concurs.     Pancreatitis   As above. Nivolumab has been stopped.    Dental abscess   Will be seeing oral surgery for assessment, will follow recommendations.     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     Grade 1 dermatitis   Improving, stopped doxycycline.     Neoplasm pain  Monitor.  Follow up with palliative care.     Hydronephrosis   Follow up with Urology for co management. No acute intervention was recommended.      Nausea   Continue compazine as needed, improving.     Constipation  Start sennakot and daily Miralax.     Transaminitis  Fluctuates.  Continue to monitor. No obvious liver mets.     Hypercalcemia   Mild, secondary to hyperparathyroidism.  Avoid calcium supplements. Continue Endocrine follow up.     Hypothyroidism  Multinodular goiter   Continue Levothyroxine. Recent dose adjustment noted.   Had a non diagnostic biopsy in 2012, usg findings have remained stable. Reviewed repeat thyroid US, stable, will follow up with Endocrine.     Essential HTN   Continue antihypertensives.      Anxiety   Continue to  follow up with Onc psych.    Vaginal candidosis   Responds to fluconazole, monitor.    MDM includes  :    - Acute or chronic illness or injury that poses a threat to life or bodily function  - Independent review and explanation of 3+ results from unique tests  - Discussion of management and ordering 3+ unique tests  - Extensive discussion of treatment and management  - Prescription drug management  - Drug therapy requiring intensive monitoring for toxicity          ECOG SCORE    1 - Restricted in strenuous activity-ambulatory and able to carry out work of a light nature           Discussion:   No follow-ups on file.    Plan was discussed with the patient at length, and she verbalized understanding. Gail was given an opportunity to ask questions that were answered to her satisfaction, and she was advised to call in the interval if any problems or questions arise.    Electronically signed by Marah Santo MD        Route Chart for Scheduling    Med Onc Chart Routing      Follow up with physician . 2/19 overbook move my appt from 2/12   Follow up with [facility]    Infusion scheduling note   2/19 avastin   Injection scheduling note    Labs    Imaging    Pharmacy appointment    Other referrals                  Treatment Plan Information   OP BEVACIZUMAB Q4W   aMrah Santo MD   Upcoming Treatment Dates - OP BEVACIZUMAB  Q4W    1/16/2024       Chemotherapy       bevacizumab-awwb (MVASI) 5 mg/kg = 625 mg in sodium chloride 0.9% 100 mL infusion  1/30/2024       Chemotherapy       bevacizumab-awwb (MVASI) 5 mg/kg = 625 mg in sodium chloride 0.9% 100 mL infusion  2/13/2024       Chemotherapy       bevacizumab-awwb (MVASI) 5 mg/kg = 625 mg in sodium chloride 0.9% 100 mL infusion  2/27/2024       Chemotherapy       bevacizumab-awwb (MVASI) 5 mg/kg = 625 mg in sodium chloride 0.9% 100 mL infusion    Supportive Plan Information  IV FLUIDS AND ELECTROLYTES   Brayden Solo MD   Upcoming Treatment Dates - IV FLUIDS AND ELECTROLYTES    No upcoming days in selected categories.    Therapy Plan Information  PORT FLUSH  Flushes  heparin, porcine (PF) 100 unit/mL injection flush 500 Units  500 Units, Intravenous, Every visit  sodium chloride 0.9% flush 10 mL  10 mL, Intravenous, Every visit    VENOFER (IRON SUCROSE) QW  Medications  iron sucrose injection 100 mg  100 mg, Intravenous, 1 time a week  Anaphylaxis/Hypersensitivity  EPINEPHrine (EPIPEN) 0.3 mg/0.3 mL pen injection 0.3 mg  0.3 mg, Intramuscular, PRN  diphenhydrAMINE injection 50 mg  50 mg, Intravenous, PRN  hydrocortisone sodium succinate injection 100 mg  100 mg, Intravenous, PRN  Flushes  sodium chloride 0.9% 250 mL flush bag  Intravenous, 1 time a week  sodium chloride 0.9% flush 10 mL  10 mL, Intravenous, 1 time a week  heparin, porcine (PF) 100 unit/mL injection flush 500 Units  500 Units, Intravenous, 1 time a week  alteplase injection 2 mg  2 mg, Intra-Catheter, 1 time a week

## 2024-02-09 NOTE — TELEPHONE ENCOUNTER
Returned pt phone call, and rescheduled appts as requested.    ----- Message from Terrence Cisneros sent at 2/9/2024 11:08 AM CST -----  Type: Need Medical Advice   Who Called: patient  Best callback number: [phone]  Additional Information: Patient called to reschedule her appointment and labs on 2/12 to 2/26 per Dr Santo  Please call to further assist, Thanks.

## 2024-02-14 NOTE — TELEPHONE ENCOUNTER
Spoke to patient , she went to [facility] and was evaluated, they think it was her gallbladder and food she had eaten. She is feeling better now and we will see in clinic later this week.

## 2024-02-14 NOTE — TELEPHONE ENCOUNTER
"Pt reports severe pain near belly button after eating w/ one episode of vomiting green. Advised, per protocol and verbalizes understanding.     Reason for Disposition   [1] Vomiting AND [2] contains bile (green color)    Additional Information   Negative: Shock suspected (e.g., cold/pale/clammy skin, too weak to stand, low BP, rapid pulse)   Negative: Difficult to awaken or acting confused (e.g., disoriented, slurred speech)   Negative: Passed out (i.e., lost consciousness, collapsed and was not responding)   Negative: Sounds like a life-threatening emergency to the triager   Negative: [1] SEVERE pain (e.g., excruciating) AND [2] present > 1 hour   Negative: [1] SEVERE pain AND [2] age > 60 years   Negative: [1] Vomiting AND [2] contains red blood or black ("coffee ground") material  (Exception: Few red streaks in vomit that only happened once.)   Negative: Blood in bowel movements  (Exception: Blood on surface of BM with constipation.)   Negative: Black or tarry bowel movements  (Exception: Chronic-unchanged black-grey BMs AND is taking iron pills or Pepto-Bismol.)    Protocols used: Abdominal Pain - Female-A-AH    "

## 2024-02-16 NOTE — TELEPHONE ENCOUNTER
----- Message from Akil Moss DO sent at 2/12/2024  7:48 AM CST -----  Its usually ok to take a calcium supplement and still important for bone health. Can we find out how much she is taking?  ----- Message -----  From: Alba Carbajal LPN  Sent: 2/12/2024   7:44 AM CST  To: Akil Moss DO      ----- Message -----  From: Janis Adams RN  Sent: 2/9/2024   2:00 PM CST  To: NORBERT Nair,     While Gail Mckinnon was in the clinic seeing Dr. Santo yesterday, she reported taking Calcium with Vit. D.  Dr. Santo and I were concerned that she was taking a calcium replacement with her hypercalcemia.     I phoned the patient just a few minutes ago and instructed her to hold Calcium supplement until she had been contacted by you with instructions.     Please let me know if you have any questions for me.    Thanks,     CHANDRIKA Barger

## 2024-02-19 NOTE — PROGRESS NOTES
PROGRESS NOTE    Subjective:       Patient ID: Gail Mckinnon is a 53 y.o. female.  MRN: [id number]  : 1970    Chief Complaint: Metastatic colon cancer     History of Present Illness:   Gail Mckinnon is a 53 y.o. female who presents with colon cancer, initially stage III and now with LN recurrence.    On FOLIRI+ Avastin sine .     She was briefly switched to xeloda due to 5 FU shortage for a month. Did not tolerate Xeloda well due hand foot syndrome, blistering of feet, did not take the last day of Xeloda. Completed .     Resumed Folfiri + Avastin on 23. Atropine was held due to prior constipation.     Was admitted to the hospital from -23 for intractable nausea , vomiting and diarrhea as well as abdominal pain. No hematochezia or dark stool was noted.      US showed gallbladder stone and dilated CBD. She was managed conservatively. Had CT abd, pelvis, colonoscopy and MRCP that were relatively unremarkable without signs of cholecystitis. Cholecystectomy was not recommended.     She had recently resumed FOLFIRI and the symptoms started after the first cycle of resuming, never had issues prior to this.  Stayed in the hospital for 10 days.  C diff was negative. No other etiology was established. Symptoms improved over time and she was discharged on .     Interim history:  Enrolled in  SWOG 2107 (encorafenib/cetuximab and randomized to the Nivolumab arm),      Staging scans are done as per research protocol first completed on 10/9, consistent with response to treatment. Most recent repeat scans . Porgression vs pseudo progression.   We discussed her follow up scans, images reviewed at Tumor board, PET scan was recommended and completed. Four week interval scanning was recommended. Continued progression needed and she was taken off trial.      24  S/p excision of the vulvar mass, pathology consistent with pyoderma  gangrenosum. Has some local pain and itching.   She developed gum swelling again and was prescribed  cefuroxime again with improvement and some bleeding. Will be seeing the dentist on Wednesday.       2/8/24  Was hospitalized for acute pancreatitic about 6 days. Gall bladder vs immune therapy related. Started on steroids , on a weekly taper, on 40 mg this week.   Had had a dental abscess, bothersome, needs to be extracted but her insurance does not cover it.   Has not started Lonsurf yet. Avastin has been on hold on anticipation of dental work. Off antibiotics. Will start Lonsurf on Monday 2/19/24  Completed steroid taper.   Had an ER visit due to nausea, vomiting, abdominal pain on 2/13/24. Had fried food prior to that visit.   Had an US that showed cholelithiasis and no cholecystitis. Lipase was normal.   Started Lonsurf on 2/12/24. So far, tolerating it well.   Is not planning on undergoing dental work as she cannot afford it. There is no active infection, off antibiotics. Understands risks, willing to start Avastin today.     Oncology History:  She completed adjuvant FOLFOX in November 2020. She tolerated only 4 cycles of FOLFOX before she developed an infusion reaction to oxaliplatin in cycle 5 was well as cycle 6. She then completed 6 cycles of infusional 5 FU.     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 She was offered second line therapy with FOLFIRI and Avastin.      She presented to the ED on 11/26 with left flank pain. CT renal stone study showed Moderate hydronephrosis on the left secondary to 3 mm calculus at the UPJ.    She had a PET scan mid April that showed possible progression of her disease with      She has been to [facility] for another opinion. No change was recommended in systemic therapy but she was offered surgical removal of the aorta caval nodes.  Recent sans at [facility]  show stable disease.      Surgery done 7/12/22. Received 2 more cycle of FOLFIRI without  Avastin.     She had repeat imaging at [facility] on 9/24/22 that unfortunately showed disease progression since her surgery with multiple RP nodes and one mediastinal node.       PET 12/8/22  Impression:     1. Progression of disease with interval increase of abdominal and pelvic lymphadenopathy.  2. New area of moderate FDG uptake at the right half of the T9 vertebral body (image 124).  Favor degenerative disc changes.  No underlying osteolytic or osteoblastic lesion.  Recommend continued attention on follow-up.  3. No other evidence of FDG avid disease.     12/22/22  Impression After scan comparison:     1. Metastatic portacaval and left periaortic retroperitoneal lymph nodes which remain stable between the CT of 09/24/2022 and the subsequent PET-CT of 12/08/2022.  There is no evidence of progression of metastatic disease.  2. Nonobstructing bilateral renal calculi and cholelithiasis.    2/10/22:  CT chest abd pelvis  Impression:     Stable periportal, retroperitoneal and mesenteric lymphadenopathy compared to PET-CT 12/08/2022.     Stable right middle lobe 3 mm pulmonary nodule.  No new or enlarging pulmonary nodule.     Hepatic steatosis.  Hepatosplenomegaly.     Cholelithiasis.     Bilateral nonobstructing nephrolithiasis.     Small hiatal hernia with retained contrast in the distal esophagus.  Correlate for reflux.    She had virtual visit at [facility] on 2/17/23.     She has continued FOLFIRI and Avastin.     CT abd pelvis   5/7/23  Impression:     1. Mesenteric inflammatory changes have worsened since the prior study.  2. Mesenteric, retroperitoneal and left-sided pelvic enlarged lymph nodes are unchanged.     8/11/23  CT chest abd pelvis   Impression:     1. Patient with a history of colon adenocarcinoma with worsening periaortic, retroperitoneal, mesenteric, and iliac chain lymphadenopathy the in the abdomen and pelvis when compared with the previous study suggesting worsening metastatic disease.  2. Moderate left  hydronephrosis and proximal left hydroureter to the level of possible retroperitoneal scarring.  Invasion/compression of the left ureter is not excluded.  3. Bilateral nephrolithiasis  4. Hepatomegaly and hepatic steatosis  5. Two tiny < 5 mm pulmonary nodules within the chest, unchanged.  No new pulmonary nodules.    10/9/23  CT chest abd pelvis     Impression:     Decreased size of retroperitoneal and mesenteric lymph nodes.  Stable left external iliac lymph node. Right upper lobe pulmonary nodule appears slightly increased in size.  No new pulmonary nodules.  Findings suggest mixed/partial response to therapy.     Cholelithiasis.     Hepatosplenomegaly.     Small pericardial effusion.     RECIST SUMMARY:     Date of prior examination for comparison: 08/11/2023     Lesion 1: Retroperitoneal soft tissue nodule but in the duodenum.  1.7 cm. Series 3 image 93.  Prior measurement 82.1 cm.     Lesion 2: Celiac axis lymph node.  1.5 cm. Series 3 image 56.  Prior measurement 1.8 cm.     Lesion 3: Portacaval lymph node.  1.0 cm. Series 3 image 59.  Prior measurement 1.2 cm.     Lesion 4: Mesenteric lymph node.  1.2 cm. Series 3 image 105.  Prior measurement 1.6 cm.     12/4/23 CT chest abd pelvis  Impression:     1. Stable and mildly enlarged mediastinal, retroperitoneal, mesenteric, and left pelvic lymph nodes, as well as interval development of new nodular soft tissue densities along the left pericolic gutter worrisome for tumoral deposits.  2. Interval slight increase in size several solid pulmonary nodules.  No new pulmonary nodule.  3. New small area of nodular wall thickening along the posterosuperior aspect of the urinary bladder, nonspecific and possibly related to adjacent fibrosis/scarring, with small tumoral deposit not excluded.  Consider correlation with cystoscopy.  4. Bilateral nonobstructive nephrolithiasis.  Mild left hydronephrosis.  5. Multiple additional findings, as noted above.  RECIST SUMMARY:      Date of prior examination for comparison: 10/09/2023     Lesion 1: Retroperitoneal soft tissue nodule abutting the duodenum.  1.3 cm. Series 102 image 155.  Prior measurement 1.3 cm.     Lesion 2: Celiac axis lymph node.  1.5 cm. Series 102 image 82.  Prior measurement 1.5 cm.     Lesion 3: Portacaval lymph node.  1.2 cm. Series 102 image 92.  Prior measurement 1.2 cm.     Lesion 4: Mesenteric lymph node.  1.6 cm. Series 102 image 177.  Prior measurement 1.2 cm.    12/13/23  PET     Impression:     Progression of disease with multiple new and enlarging lymph nodes throughout the pelvis and mesentery along with new metastatic FDG avid mediastinal adenopathy as above.  Oncology History:  Oncology History   Malignant neoplasm of sigmoid colon   3/16/2020 Initial Diagnosis    Malignant neoplasm of sigmoid colon     3/31/2020 Cancer Staged    Staging form: Colon and Rectum, AJCC 8th Edition  - Clinical stage from 3/31/2020: Stage IIIC (cT4b, cN2a, cM0)     5/6/2020 - 11/17/2020 Chemotherapy    Treatment Summary   Plan Name: OP FOLFOX 6 Q2W  Treatment Goal: Curative  Status: Inactive  Start Date: 5/6/2020  End Date: 11/6/2020  Provider: Dylan Leyva MD  Chemotherapy: fluorouraciL injection 945 mg, 400 mg/m2 = 945 mg, Intravenous, Clinic/[facility] 1 time, 14 of 14 cycles  Administration: 945 mg (5/6/2020), 945 mg (5/20/2020), 945 mg (6/3/2020), 945 mg (6/17/2020), 945 mg (7/29/2020), 945 mg (8/12/2020), 945 mg (8/26/2020), 945 mg (9/9/2020), 945 mg (9/23/2020), 945 mg (10/6/2020), 945 mg (10/21/2020), 945 mg (11/4/2020)  fluorouraciL 2,400 mg/m2 = 5,665 mg in sodium chloride 0.9% 240 mL chemo infusion, 2,400 mg/m2 = 5,665 mg, Intravenous, Over 46 hours, 14 of 14 cycles  Administration: 5,665 mg (5/6/2020), 5,665 mg (5/20/2020), 5,665 mg (6/3/2020), 5,665 mg (6/17/2020), 5,665 mg (7/29/2020), 5,665 mg (8/12/2020), 5,665 mg (8/26/2020), 5,665 mg (9/9/2020), 5,665 mg (9/23/2020), 5,665 mg (10/6/2020), 5,665 mg (10/21/2020),  5,665 mg (11/4/2020)  leucovorin calcium 900 mg in dextrose 5 % 250 mL infusion, 945 mg, Intravenous, [facility] 1 time, 14 of 14 cycles  Administration: 900 mg (5/6/2020), 900 mg (5/20/2020), 900 mg (6/3/2020), 900 mg (6/17/2020), 945 mg (6/30/2020), 945 mg (7/20/2020), 945 mg (7/29/2020), 945 mg (8/12/2020), 945 mg (8/26/2020), 945 mg (9/9/2020), 945 mg (9/23/2020), 945 mg (10/6/2020), 945 mg (10/21/2020), 945 mg (11/4/2020)  oxaliplatin (ELOXATIN) 200 mg in dextrose 5 % 500 mL chemo infusion, 201 mg, Intravenous, Clinic/[facility] 1 time, 6 of 6 cycles  Dose modification: 65 mg/m2 (original dose 85 mg/m2, Cycle 6)  Administration: 200 mg (5/6/2020), 200 mg (5/20/2020), 200 mg (6/3/2020), 200 mg (6/17/2020), 201 mg (6/30/2020), 150 mg (7/20/2020)     5/3/2021 Cancer Staged    Staging form: Colon and Rectum, AJCC 8th Edition  - Clinical stage from 5/3/2021: Stage Unknown (rcT0, cNX, cM1)     6/16/2021 - 8/2/2023 Chemotherapy    Treatment Summary   Plan Name: OP COLORECTAL FOLFIRI + BEVACIZUMAB Q2W  Treatment Goal: Control  Status: Inactive  Start Date: 6/16/2021  End Date: 8/2/2023  Provider: Marah Santo MD  Chemotherapy: fluorouraciL injection 990 mg, 400 mg/m2 = 990 mg, Intravenous, [facility] 1 time, 15 of 15 cycles  Administration: 990 mg (6/16/2021), 990 mg (6/30/2021), 990 mg (7/14/2021), 980 mg (7/28/2021), 990 mg (8/11/2021), 990 mg (8/25/2021), 990 mg (9/8/2021), 990 mg (9/22/2021), 990 mg (10/6/2021), 990 mg (10/20/2021), 990 mg (11/3/2021), 990 mg (12/1/2021), 990 mg (12/15/2021), 990 mg (11/17/2021), 990 mg (12/28/2021)  fluorouraciL 2,400 mg/m2 = 5,950 mg in sodium chloride 0.9% 240 mL chemo infusion, 2,400 mg/m2 = 5,950 mg, Intravenous, Over 46 hours, 43 of 46 cycles  Administration: 5,950 mg (6/16/2021), 5,950 mg (6/30/2021), 5,950 mg (7/14/2021), 5,880 mg (7/28/2021), 5,930 mg (8/11/2021), 5,930 mg (8/25/2021), 5,930 mg (9/8/2021), 5,930 mg (9/22/2021), 5,930 mg (10/6/2021), 5,930 mg (10/20/2021),  5,930 mg (11/3/2021), 5,930 mg (12/1/2021), 5,930 mg (12/15/2021), 5,930 mg (11/17/2021), 5,930 mg (12/28/2021), 6,050 mg (5/11/2022), 6,025 mg (3/9/2022), 6,025 mg (2/23/2022), 5,930 mg (2/2/2022), 5,930 mg (1/19/2022), 6,050 mg (4/20/2022), 6,025 mg (4/6/2022), 6,025 mg (3/23/2022), 6,050 mg (6/1/2022), 6,050 mg (6/15/2022), 6,050 mg (10/19/2022), 6,050 mg (10/5/2022), 6,050 mg (11/2/2022), 6,050 mg (11/16/2022), 6,050 mg (11/30/2022), 6,050 mg (1/4/2023), 6,050 mg (12/19/2022), 6,050 mg (1/18/2023), 6,000 mg (5/22/2023), 6,000 mg (4/26/2023), 6,000 mg (4/11/2023), 6,000 mg (2/28/2023), 6,050 mg (2/14/2023), 6,000 mg (2/1/2023), 6,000 mg (7/5/2023), 6,000 mg (6/19/2023), 6,000 mg (6/5/2023), 6,000 mg (7/31/2023)  bevacizumab (AVASTIN) 600 mg in sodium chloride 0.9% 100 mL chemo infusion, 660 mg, Intravenous, [facility] 1 time, 36 of 36 cycles  Dose modification: 5 mg/kg (original dose 5 mg/kg, Cycle 26, Reason: MD Discretion, Comment: 5 mg/kg original dose)  Administration: 600 mg (6/30/2021), 600 mg (7/14/2021), 600 mg (7/28/2021), 600 mg (6/16/2021), 600 mg (8/11/2021), 655 mg (8/25/2021), 600 mg (9/8/2021), 600 mg (9/22/2021), 600 mg (10/6/2021), 600 mg (10/20/2021), 600 mg (11/3/2021), 655 mg (12/1/2021), 600 mg (12/15/2021), 600 mg (11/17/2021), 600 mg (12/28/2021), 600 mg (5/11/2022), 600 mg (3/9/2022), 600 mg (2/23/2022), 600 mg (2/2/2022), 600 mg (1/19/2022), 600 mg (4/20/2022), 680 mg (4/6/2022), 600 mg (3/23/2022), 680 mg (10/5/2022), 680 mg (10/19/2022), 680 mg (11/2/2022), 680 mg (11/16/2022), 680 mg (11/30/2022), 680 mg (1/4/2023), 680 mg (12/19/2022), 680 mg (1/18/2023), 680 mg (3/28/2023), 680 mg (3/14/2023), 680 mg (2/28/2023), 680 mg (2/14/2023), 680 mg (2/1/2023)  irinotecan (CAMPTOSAR) 440 mg in sodium chloride 0.9% 500 mL chemo infusion, 446 mg, Intravenous, Clinic/[facility] 1 time, 45 of 48 cycles  Dose modification: 180 mg/m2 (original dose 180 mg/m2, Cycle 24)  Administration: 440 mg (6/16/2021),  440 mg (6/30/2021), 440 mg (7/14/2021), 440 mg (7/28/2021), 440 mg (8/11/2021), 444 mg (8/25/2021), 440 mg (9/8/2021), 440 mg (9/22/2021), 440 mg (10/6/2021), 440 mg (10/20/2021), 440 mg (11/3/2021), 440 mg (12/1/2021), 440 mg (12/15/2021), 440 mg (11/17/2021), 440 mg (12/28/2021), 440 mg (5/11/2022), 440 mg (3/9/2022), 440 mg (2/23/2022), 440 mg (2/2/2022), 440 mg (1/19/2022), 440 mg (4/20/2022), 440 mg (4/6/2022), 440 mg (3/24/2022), 440 mg (6/1/2022), 440 mg (6/15/2022), 440 mg (10/19/2022), 440 mg (10/5/2022), 440 mg (11/2/2022), 440 mg (11/16/2022), 440 mg (11/30/2022), 440 mg (1/4/2023), 440 mg (12/19/2022), 440 mg (1/18/2023), 440 mg (5/22/2023), 440 mg (4/26/2023), 440 mg (4/11/2023), 440 mg (3/28/2023), 440 mg (3/14/2023), 440 mg (2/28/2023), 440 mg (2/14/2023), 440 mg (2/1/2023), 440 mg (7/5/2023), 440 mg (6/19/2023), 440 mg (6/5/2023), 440 mg (7/31/2023)  bevacizumab-awwb (MVASI) 5 mg/kg = 680 mg in sodium chloride 0.9% 100 mL infusion, 5 mg/kg = 680 mg (100 % of original dose 5 mg/kg), Intravenous, [facility] 1 time, 7 of 10 cycles  Dose modification: 5 mg/kg (original dose 5 mg/kg, Cycle 39)  Administration: 680 mg (4/11/2023), 680 mg (4/26/2023), 680 mg (5/22/2023), 680 mg (7/5/2023), 680 mg (6/19/2023), 680 mg (6/5/2023), 680 mg (7/31/2023)     8/17/2023 - 8/18/2023 Chemotherapy    Treatment Summary   Plan Name: OP CETUXIMAB 500MG Q2W  Treatment Goal: Control  Status: Inactive  Start Date:   End Date:   Provider: Marah Santo MD  Chemotherapy: [No matching medication found in this treatment plan]     8/24/2023 - 12/29/2023 Chemotherapy    Treatment Summary   Plan Name: [facility] - ARM 1 ENCORAFENIB  CETUXIMAB NIVOLUMAB  Treatment Goal: Palliative  Status: Inactive  Start Date: 8/24/2023  End Date: 12/29/2023  Provider: Marah Santo MD  Chemotherapy: cetuximab (ERBITUX) 100 mg/50 mL chemo infusion 1,200 mg, 1,230 mg, Intravenous, Clinic/[facility] 1 time, 5 of 12 cycles  Administration: 1,200 mg  (8/24/2023), 1,200 mg (9/8/2023), 1,200 mg (9/22/2023), 1,200 mg (10/6/2023), 1,200 mg (10/20/2023), 1,200 mg (11/3/2023), 1,200 mg (11/17/2023), 1,200 mg (12/1/2023), 1,200 mg (12/15/2023), 1,200 mg (12/29/2023)  encorafenib 75 mg Cap, 300 mg, Oral, Daily, 1 of 1 cycle, Start date: --, End date: --     2/19/2024 -  Chemotherapy    Treatment Summary   Plan Name: OP BEVACIZUMAB Q4W  Treatment Goal: Control  Status: Active  Start Date: 2/19/2024 (Planned)  End Date: 1/6/2025 (Planned)  Provider: Marah Santo MD  Chemotherapy: bevacizumab-awwb (MVASI) 5 mg/kg = 625 mg in sodium chloride 0.9% 100 mL infusion, 5 mg/kg = 625 mg (100 % of original dose 5 mg/kg), Intravenous, [facility] 1 time, 0 of 12 cycles  Dose modification: 5 mg/kg (original dose 5 mg/kg, Cycle 1, Reason: Other (see comments), Comment: Given with Lonsurf)     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   4/3/2020 Initial Diagnosis    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     5/6/2020 - 11/17/2020 Chemotherapy    Treatment Summary   Plan Name: OP FOLFOX 6 Q2W  Treatment Goal: Curative  Status: Inactive  Start Date: 5/6/2020  End Date: 11/6/2020  Provider: Dylan Leyva MD  Chemotherapy: fluorouraciL injection 945 mg, 400 mg/m2 = 945 mg, Intravenous, [facility] 1 time, 14 of 14 cycles  Administration: 945 mg (5/6/2020), 945 mg (5/20/2020), 945 mg (6/3/2020), 945 mg (6/17/2020), 945 mg (7/29/2020), 945 mg (8/12/2020), 945 mg (8/26/2020), 945 mg (9/9/2020), 945 mg (9/23/2020), 945 mg (10/6/2020), 945 mg (10/21/2020), 945 mg (11/4/2020)  fluorouraciL 2,400 mg/m2 = 5,665 mg in sodium chloride 0.9% 240 mL chemo infusion, 2,400 mg/m2 = 5,665 mg, Intravenous, Over 46 hours, 14 of 14 cycles  Administration: 5,665 mg (5/6/2020), 5,665 mg (5/20/2020), 5,665 mg (6/3/2020), 5,665 mg (6/17/2020), 5,665 mg (7/29/2020), 5,665 mg (8/12/2020), 5,665 mg (8/26/2020), 5,665 mg (9/9/2020), 5,665 mg (9/23/2020), 5,665 mg (10/6/2020), 5,665 mg (10/21/2020), 5,665 mg  (11/4/2020)  leucovorin calcium 900 mg in dextrose 5 % 250 mL infusion, 945 mg, Intravenous, Clinic/[facility] 1 time, 14 of 14 cycles  Administration: 900 mg (5/6/2020), 900 mg (5/20/2020), 900 mg (6/3/2020), 900 mg (6/17/2020), 945 mg (6/30/2020), 945 mg (7/20/2020), 945 mg (7/29/2020), 945 mg (8/12/2020), 945 mg (8/26/2020), 945 mg (9/9/2020), 945 mg (9/23/2020), 945 mg (10/6/2020), 945 mg (10/21/2020), 945 mg (11/4/2020)  oxaliplatin (ELOXATIN) 200 mg in dextrose 5 % 500 mL chemo infusion, 201 mg, Intravenous, Clinic/[facility] 1 time, 6 of 6 cycles  Dose modification: 65 mg/m2 (original dose 85 mg/m2, Cycle 6)  Administration: 200 mg (5/6/2020), 200 mg (5/20/2020), 200 mg (6/3/2020), 200 mg (6/17/2020), 201 mg (6/30/2020), 150 mg (7/20/2020)     6/16/2021 - 8/2/2023 Chemotherapy    Treatment Summary   Plan Name: OP COLORECTAL FOLFIRI + BEVACIZUMAB Q2W  Treatment Goal: Control  Status: Inactive  Start Date: 6/16/2021  End Date: 8/2/2023  Provider: Marah Santo MD  Chemotherapy: fluorouraciL injection 990 mg, 400 mg/m2 = 990 mg, Intravenous, Clinic/[facility] 1 time, 15 of 15 cycles  Administration: 990 mg (6/16/2021), 990 mg (6/30/2021), 990 mg (7/14/2021), 980 mg (7/28/2021), 990 mg (8/11/2021), 990 mg (8/25/2021), 990 mg (9/8/2021), 990 mg (9/22/2021), 990 mg (10/6/2021), 990 mg (10/20/2021), 990 mg (11/3/2021), 990 mg (12/1/2021), 990 mg (12/15/2021), 990 mg (11/17/2021), 990 mg (12/28/2021)  fluorouraciL 2,400 mg/m2 = 5,950 mg in sodium chloride 0.9% 240 mL chemo infusion, 2,400 mg/m2 = 5,950 mg, Intravenous, Over 46 hours, 43 of 46 cycles  Administration: 5,950 mg (6/16/2021), 5,950 mg (6/30/2021), 5,950 mg (7/14/2021), 5,880 mg (7/28/2021), 5,930 mg (8/11/2021), 5,930 mg (8/25/2021), 5,930 mg (9/8/2021), 5,930 mg (9/22/2021), 5,930 mg (10/6/2021), 5,930 mg (10/20/2021), 5,930 mg (11/3/2021), 5,930 mg (12/1/2021), 5,930 mg (12/15/2021), 5,930 mg (11/17/2021), 5,930 mg (12/28/2021), 6,050 mg (5/11/2022), 6,025 mg (3/9/2022),  6,025 mg (2/23/2022), 5,930 mg (2/2/2022), 5,930 mg (1/19/2022), 6,050 mg (4/20/2022), 6,025 mg (4/6/2022), 6,025 mg (3/23/2022), 6,050 mg (6/1/2022), 6,050 mg (6/15/2022), 6,050 mg (10/19/2022), 6,050 mg (10/5/2022), 6,050 mg (11/2/2022), 6,050 mg (11/16/2022), 6,050 mg (11/30/2022), 6,050 mg (1/4/2023), 6,050 mg (12/19/2022), 6,050 mg (1/18/2023), 6,000 mg (5/22/2023), 6,000 mg (4/26/2023), 6,000 mg (4/11/2023), 6,000 mg (2/28/2023), 6,050 mg (2/14/2023), 6,000 mg (2/1/2023), 6,000 mg (7/5/2023), 6,000 mg (6/19/2023), 6,000 mg (6/5/2023), 6,000 mg (7/31/2023)  bevacizumab (AVASTIN) 600 mg in sodium chloride 0.9% 100 mL chemo infusion, 660 mg, Intravenous, [facility] 1 time, 36 of 36 cycles  Dose modification: 5 mg/kg (original dose 5 mg/kg, Cycle 26, Reason: MD Discretion, Comment: 5 mg/kg original dose)  Administration: 600 mg (6/30/2021), 600 mg (7/14/2021), 600 mg (7/28/2021), 600 mg (6/16/2021), 600 mg (8/11/2021), 655 mg (8/25/2021), 600 mg (9/8/2021), 600 mg (9/22/2021), 600 mg (10/6/2021), 600 mg (10/20/2021), 600 mg (11/3/2021), 655 mg (12/1/2021), 600 mg (12/15/2021), 600 mg (11/17/2021), 600 mg (12/28/2021), 600 mg (5/11/2022), 600 mg (3/9/2022), 600 mg (2/23/2022), 600 mg (2/2/2022), 600 mg (1/19/2022), 600 mg (4/20/2022), 680 mg (4/6/2022), 600 mg (3/23/2022), 680 mg (10/5/2022), 680 mg (10/19/2022), 680 mg (11/2/2022), 680 mg (11/16/2022), 680 mg (11/30/2022), 680 mg (1/4/2023), 680 mg (12/19/2022), 680 mg (1/18/2023), 680 mg (3/28/2023), 680 mg (3/14/2023), 680 mg (2/28/2023), 680 mg (2/14/2023), 680 mg (2/1/2023)  irinotecan (CAMPTOSAR) 440 mg in sodium chloride 0.9% 500 mL chemo infusion, 446 mg, Intravenous, Clinic/[facility] 1 time, 45 of 48 cycles  Dose modification: 180 mg/m2 (original dose 180 mg/m2, Cycle 24)  Administration: 440 mg (6/16/2021), 440 mg (6/30/2021), 440 mg (7/14/2021), 440 mg (7/28/2021), 440 mg (8/11/2021), 444 mg (8/25/2021), 440 mg (9/8/2021), 440 mg (9/22/2021), 440 mg  (10/6/2021), 440 mg (10/20/2021), 440 mg (11/3/2021), 440 mg (12/1/2021), 440 mg (12/15/2021), 440 mg (11/17/2021), 440 mg (12/28/2021), 440 mg (5/11/2022), 440 mg (3/9/2022), 440 mg (2/23/2022), 440 mg (2/2/2022), 440 mg (1/19/2022), 440 mg (4/20/2022), 440 mg (4/6/2022), 440 mg (3/24/2022), 440 mg (6/1/2022), 440 mg (6/15/2022), 440 mg (10/19/2022), 440 mg (10/5/2022), 440 mg (11/2/2022), 440 mg (11/16/2022), 440 mg (11/30/2022), 440 mg (1/4/2023), 440 mg (12/19/2022), 440 mg (1/18/2023), 440 mg (5/22/2023), 440 mg (4/26/2023), 440 mg (4/11/2023), 440 mg (3/28/2023), 440 mg (3/14/2023), 440 mg (2/28/2023), 440 mg (2/14/2023), 440 mg (2/1/2023), 440 mg (7/5/2023), 440 mg (6/19/2023), 440 mg (6/5/2023), 440 mg (7/31/2023)  bevacizumab-awwb (MVASI) 5 mg/kg = 680 mg in sodium chloride 0.9% 100 mL infusion, 5 mg/kg = 680 mg (100 % of original dose 5 mg/kg), Intravenous, Clinic/[facility] 1 time, 7 of 10 cycles  Dose modification: 5 mg/kg (original dose 5 mg/kg, Cycle 39)  Administration: 680 mg (4/11/2023), 680 mg (4/26/2023), 680 mg (5/22/2023), 680 mg (7/5/2023), 680 mg (6/19/2023), 680 mg (6/5/2023), 680 mg (7/31/2023)     8/17/2023 - 8/18/2023 Chemotherapy    Treatment Summary   Plan Name: OP CETUXIMAB 500MG Q2W  Treatment Goal: Control  Status: Inactive  Start Date:   End Date:   Provider: Marah Santo MD  Chemotherapy: [No matching medication found in this treatment plan]     8/24/2023 - 12/29/2023 Chemotherapy    Treatment Summary   Plan Name: [facility] - ARM 1 ENCORAFENIB  CETUXIMAB NIVOLUMAB  Treatment Goal: Palliative  Status: Inactive  Start Date: 8/24/2023  End Date: 12/29/2023  Provider: Marah Santo MD  Chemotherapy: cetuximab (ERBITUX) 100 mg/50 mL chemo infusion 1,200 mg, 1,230 mg, Intravenous, Clinic/[facility] 1 time, 5 of 12 cycles  Administration: 1,200 mg (8/24/2023), 1,200 mg (9/8/2023), 1,200 mg (9/22/2023), 1,200 mg (10/6/2023), 1,200 mg (10/20/2023), 1,200 mg (11/3/2023), 1,200 mg (11/17/2023), 1,200  mg (2023), 1,200 mg (12/15/2023), 1,200 mg (2023)  encorafenib 75 mg Cap, 300 mg, Oral, Daily, 1 of 1 cycle, Start date: --, End date: --     2024 -  Chemotherapy    Treatment Summary   Plan Name: OP BEVACIZUMAB Q4W  Treatment Goal: Control  Status: Active  Start Date: 2024 (Planned)  End Date: 2025 (Planned)  Provider: Marah Santo MD  Chemotherapy: bevacizumab-awwb (MVASI) 5 mg/kg = 625 mg in sodium chloride 0.9% 100 mL infusion, 5 mg/kg = 625 mg (100 % of original dose 5 mg/kg), Intravenous, [facility] 1 time, 0 of 12 cycles  Dose modification: 5 mg/kg (original dose 5 mg/kg, Cycle 1, Reason: Other (see comments), Comment: Given with Lonsurf)         History:  Past Medical History:   Diagnosis Date    Anxiety     Depression     FH: ovarian cancer 2020    Hx of psychiatric care     Effexor, Paxil, Lexapro, Zoloft, Wellbutrin, Trazodone Buspar    Hypertension     Hyperthyroidism     Hypothyroid     Kidney calculi     Malignant neoplasm of sigmoid colon 2020    Menorrhagia     Multinodular goiter 2012    Palpitation     Psychiatric problem     Venous insufficiency     Vulvar lesion       Past Surgical History:   Procedure Laterality Date     SECTION, CLASSIC      x3    COLONOSCOPY N/A 2020    Procedure: COLONOSCOPY;  Surgeon: Shane Parker MD;  Location: [facility] ENDO;  Service: Endoscopy;  Laterality: N/A;    COLONOSCOPY N/A 2022    Procedure: COLONOSCOPY;  Surgeon: Shane Parker MD;  Location: [facility] ENDO;  Service: Endoscopy;  Laterality: N/A;    COLONOSCOPY N/A 2023    Procedure: COLONOSCOPY;  Surgeon: Shane Parker MD;  Location: [facility];  Service: Endoscopy;  Laterality: N/A;  no cecum anastomosis    CYSTOSCOPY W/ URETERAL STENT PLACEMENT Bilateral 2020    Procedure: CYSTOSCOPY, WITH URETERAL STENT INSERTION;  Surgeon: Claudio Tyson MD;  Location: [facility] OR [address] [place] [address];  Service: Urology;  Laterality: Bilateral;     EXAMINATION UNDER ANESTHESIA N/A 1/16/2024    Procedure: Exam under anesthesia-vagina;  Surgeon: Eli Her MD;  Location: [facility] OR;  Service: OB/GYN;  Laterality: N/A;    EXCISION-WIDE LOCAL Left 1/16/2024    Procedure: EXCISION-WIDE LOCAL -  Labia Majora;  Surgeon: Eli Her MD;  Location: [facility] OR;  Service: OB/GYN;  Laterality: Left;  upper left side of labia majora    EXCISIONAL BIOPSY, LYMPH NODE  07/2022    abdominal x 4    INSERTION OF TUNNELED CENTRAL VENOUS CATHETER (CVC) WITH SUBCUTANEOUS PORT N/A 05/01/2020    Procedure: [id number];  Surgeon: Dosc Diagnostic Provider;  Location: [facility] OR 2ND FLR;  Service: Radiology;  Laterality: N/A;  Room 189/[facility]    LAPAROSCOPIC SIGMOIDECTOMY N/A 03/25/2020    Procedure: COLECTOMY, SIGMOID, LAPAROSCOPIC, flex sig, ERAS high;  Surgeon: Silvio Man MD;  Location: [facility] OR 2ND FLR;  Service: Colon and Rectal;  Laterality: N/A;    MOBILIZATION OF SPLENIC FLEXURE  03/25/2020    Procedure: MOBILIZATION, SPLENIC FLEXURE;  Surgeon: Silvio Man MD;  Location: [facility] OR 2ND FLR;  Service: Colon and Rectal;;    TONSILLECTOMY      TOTAL ABDOMINAL HYSTERECTOMY W/ BILATERAL SALPINGOOPHORECTOMY N/A 03/25/2020    Procedure: HYSTERECTOMY, TOTAL, ABDOMINAL, WITH BILATERAL SALPINGO-OOPHORECTOMY;  Surgeon: Keron Brady MD;  Location: [facility] OR 2ND FLR;  Service: Oncology;  Laterality: N/A;     Family History   Problem Relation Age of Onset    Heart disease Father     Diabetes Mother 65    Drug abuse Brother     Drug abuse Brother     Cancer Maternal Aunt         lung cancer    Cancer Maternal Grandmother         stomach cancer- started in ovaries    Ovarian cancer Maternal Grandmother         stomach cancer- started in ovaries    Colon cancer Paternal Uncle     Cancer Paternal Uncle     Ovarian cancer Maternal Aunt     Ovarian cancer Maternal Aunt     Ovarian cancer Cousin         mother had ovarian cancer    Drug abuse Son     Drug abuse Son   "       clean/sober since 2012    Colon cancer Son     No Known Problems Daughter     Uterine cancer Neg Hx     Breast cancer Neg Hx      Social History     Tobacco Use    Smoking status: Never    Smokeless tobacco: Never   Substance and Sexual Activity    Alcohol use: Yes     Comment: occasionally- twice monthly    Drug use: Never    Sexual activity: Yes     Partners: Male     Birth control/protection: See Surgical Hx        ROS:   Review of Systems   Constitutional:  Positive for malaise/fatigue. Negative for fever and weight loss.   HENT:  Negative for congestion, hearing loss, nosebleeds and sore throat.    Eyes:  Negative for double vision and photophobia.   Respiratory:  Negative for cough, hemoptysis, sputum production, shortness of breath and wheezing.    Cardiovascular:  Negative for chest pain, palpitations, orthopnea and leg swelling.   Gastrointestinal:  Positive for abdominal pain, diarrhea, heartburn and nausea. Negative for blood in stool, constipation and vomiting.   Genitourinary:  Positive for frequency. Negative for dysuria, hematuria and urgency.   Musculoskeletal:  Negative for back pain, joint pain and myalgias.   Skin:  Positive for rash. Negative for itching.   Neurological:  Negative for dizziness, tingling, seizures, weakness and headaches.   Endo/Heme/Allergies:  Negative for polydipsia. Does not bruise/bleed easily.   Psychiatric/Behavioral:  Negative for depression and memory loss. The patient is nervous/anxious and has insomnia (improving).         Objective:     Vitals:    02/19/24 1249   BP: (!) 142/81   Pulse: 87   Resp: 15   Temp: 98.2 °F (36.8 °C)   TempSrc: Temporal   SpO2: 98%   Weight: 124.6 kg (274 lb 11.1 oz)   Height: 5' 6" (1.676 m)   PainSc:   6   PainLoc: Groin         Wt Readings from Last 10 Encounters:   02/19/24 124.6 kg (274 lb 11.1 oz)   02/13/24 120.2 kg (265 lb)   02/08/24 121.6 kg (268 lb 1.3 oz)   02/06/24 121.7 kg (268 lb 4.8 oz)   01/28/24 122.5 kg (270 lb 1 oz) "   01/22/24 125.8 kg (277 lb 5.4 oz)   01/15/24 124.8 kg (275 lb 2.2 oz)   01/11/24 125.3 kg (276 lb 3.8 oz)   01/09/24 127.7 kg (281 lb 8.4 oz)   01/02/24 128.7 kg (283 lb 11.7 oz)       Physical Examination:   Physical Exam  Vitals and nursing note reviewed.   Constitutional:       General: She is not in acute distress.     Appearance: She is not diaphoretic.   HENT:      Head: Normocephalic.      Mouth/Throat:      Pharynx: No oropharyngeal exudate.   Eyes:      General: No scleral icterus.     Conjunctiva/sclera: Conjunctivae normal.   Neck:      Thyroid: No thyromegaly.   Cardiovascular:      Rate and Rhythm: Normal rate and regular rhythm.      Heart sounds: Normal heart sounds. No murmur heard.  Pulmonary:      Effort: Pulmonary effort is normal. No respiratory distress.      Breath sounds: No stridor. No wheezing or rales.   Chest:      Chest wall: No tenderness.   Abdominal:      General: Bowel sounds are normal. There is no distension.      Palpations: Abdomen is soft. There is no mass.      Tenderness: There is no abdominal tenderness. There is no rebound.   Musculoskeletal:         General: No tenderness or deformity. Normal range of motion.      Cervical back: Neck supple.   Lymphadenopathy:      Cervical: No cervical adenopathy.   Skin:     General: Skin is warm and dry.      Findings: No erythema.   Neurological:      Mental Status: She is alert and oriented to person, place, and time.      Cranial Nerves: No cranial nerve deficit.      Coordination: Coordination normal.      Gait: Gait is intact.   Psychiatric:         Mood and Affect: Affect normal.         Cognition and Memory: Memory normal.         Judgment: Judgment normal.          Diagnostic Tests:  Significant Imaging: I have reviewed and interpreted all pertinent imaging results/findings.  Laboratory Data:  All pertinent labs have been reviewed.    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 Oxaliplatin was stopped due to severe adverse reaction.     Follow-up CTs and PET in May 2021 showed enlarging retroperitoneal node, concerning for metastatic disease.      She started FOLFIRI + Avastin and has continued till July 2022.   Given isolated RP toni disease, she has undergone open Left periaortic and aortocaval lymph node dissection on 7/12/2022 at [facility]. Resumed FOLFIRI+ Debo with relatively stable disease but now has disease progression on scans in August 2023.     She has been enrolled into  SWOG 2107 (encorafenib/cetuximab + nivo) , initial scans showed improvement, second scans showed some new lesions, now growing after a short term repeat.  As per Recist calculations, the target lesions are stable. However, there is  POD in the left paracolic gutter, seen on PET scan as well. She was taken off trial.     Have initiated next line of SOC treatment with Lonsurf and Avastin. Treatment delayed due to recent hospitalization for pancreatitis, likely immune therapy related, improving on steroids. Completed taper. Started Lonsurf on 2/12  Ok to proceed with avastin. Dental infection has cleared, she is not planning on any procedures. Risks benefits discussed again, she is agreeable.     Look for early phase trials, will follow up with PTCP team.  Will also look into options at [facility]. She  has seen Dr. Montelongo previously and I have discussed the plan with him and he concurs. Cannot go to [facility] due to insurance.     Pancreatitis   As above. Nivolumab has been stopped.    Dental abscess  Improving, continue to monitor.     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     Grade 1 dermatitis   Improving, stopped doxycycline.     Neoplasm pain  Monitor.  Follow up with palliative care.     Hydronephrosis   Follow up with Urology for co management. No acute intervention was recommended.     Nausea   Continue compazine as needed, improving.     Constipation  Start sennakot and daily Miralax.     Transaminitis  Fluctuates.  Continue to monitor. No obvious liver mets.     Hypercalcemia   Mild, secondary to hyperparathyroidism.  Avoid calcium supplements. Continue Endocrine follow up.     Hypothyroidism  Multinodular goiter   Continue Levothyroxine. Recent dose adjustment noted.   Had a non diagnostic biopsy in 2012, usg findings have remained stable. Reviewed repeat thyroid US, stable, will follow up with Endocrine.     Essential HTN   Continue antihypertensives.      Anxiety   Continue to  follow up with Onc psych.    Vaginal candidosis   Responds to fluconazole, monitor.    MDM includes  :    - Acute or chronic illness or injury that poses a threat to life or bodily function  - Independent review and explanation of 3+ results from unique tests  - Discussion of management and ordering 3+ unique tests  - Extensive discussion of treatment and management  - Prescription drug management  - Drug therapy requiring intensive monitoring for toxicity       ECOG SCORE             Discussion:   No follow-ups on file.    Plan was discussed with the patient at length, and she verbalized understanding. Gail was given an opportunity to ask questions that were answered to her satisfaction, and she was advised to call in the interval if any problems or questions arise.    Electronically  signed by Marah Santo MD        Route Chart for Scheduling    Med Onc Chart Routing      Follow up with physician . 3/7 see Dr. Pimentel only please ,move avastin and venfor appts to Thursdays as well   Follow up with TAVO    Infusion scheduling note    Injection scheduling note    Labs CBC, CMP and urinalysis   Scheduling:  Preferred lab:  Lab interval:     Imaging    Pharmacy appointment    Other referrals                  Treatment Plan Information   OP BEVACIZUMAB Q4W   Marah Santo MD   Upcoming Treatment Dates - OP BEVACIZUMAB Q4W    2/19/2024       Chemotherapy       bevacizumab-awwb (MVASI) 5 mg/kg = 625 mg in sodium chloride 0.9% 100 mL infusion  3/4/2024       Chemotherapy       bevacizumab-awwb (MVASI) 5 mg/kg = 625 mg in sodium chloride 0.9% 100 mL infusion  3/18/2024       Chemotherapy       bevacizumab-awwb (MVASI) 5 mg/kg = 625 mg in sodium chloride 0.9% 100 mL infusion  4/1/2024       Chemotherapy       bevacizumab-awwb (MVASI) 5 mg/kg = 625 mg in sodium chloride 0.9% 100 mL infusion    Supportive Plan Information  IV FLUIDS AND ELECTROLYTES   Brayden Solo MD   Upcoming Treatment Dates - IV FLUIDS AND ELECTROLYTES    No upcoming days in selected categories.    Therapy Plan Information  PORT FLUSH  Flushes  heparin, porcine (PF) 100 unit/mL injection flush 500 Units  500 Units, Intravenous, Every visit  sodium chloride 0.9% flush 10 mL  10 mL, Intravenous, Every visit    VENOFER (IRON SUCROSE) QW  Medications  iron sucrose injection 200 mg  200 mg, Intravenous, 1 time a week  Anaphylaxis/Hypersensitivity  EPINEPHrine (EPIPEN) 0.3 mg/0.3 mL pen injection 0.3 mg  0.3 mg, Intramuscular, PRN  diphenhydrAMINE injection 50 mg  50 mg, Intravenous, PRN  hydrocortisone sodium succinate injection 100 mg  100 mg, Intravenous, PRN  Flushes  sodium chloride 0.9% 250 mL flush bag  Intravenous, 1 time a week  sodium chloride 0.9% flush 10 mL  10 mL, Intravenous, 1 time a week  heparin, porcine (PF) 100  unit/mL injection flush 500 Units  500 Units, Intravenous, 1 time a week  alteplase injection 2 mg  2 mg, Intra-Catheter, 1 time a week        Answers submitted by the patient for this visit:  Review of Systems Questionnaire (Submitted on 2/19/2024)  appetite change : No  unexpected weight change: No  mouth sores: No  visual disturbance: No  adenopathy: No

## 2024-02-19 NOTE — PROGRESS NOTES
Oncology Nutrition   Chemotherapy Infusion Visit    Nutrition Follow Up   RD met with pt at chairside during infusion tx. Pt states her appetite has been good. Pt reports she has not been drinking her oral nutrition supplement because it is too thick and sweet. Pt requesting to have ONS prescription changed. RD sent pt home with samples of Glucerna 1.5 - vanilla to try. Pt to call RD in a couple of days to decide if she wants to change to Glucerna 1.5.     Per pt chart, pt went to ED on 02/15/24 for abdominal pain, nausea, and vomiting. Pt reports she is doing better and feels like the current ONS, Ensure Plus, makes her stomach upset.     Pt eligible for TFP order. Food order filled by this RD- will provide to patient at end of infusion today.    Wt Readings from Last 10 Encounters:   02/19/24 124.6 kg (274 lb 11.1 oz)   02/19/24 124.6 kg (274 lb 11.1 oz)   02/13/24 120.2 kg (265 lb)   02/08/24 121.6 kg (268 lb 1.3 oz)   02/06/24 121.7 kg (268 lb 4.8 oz)   01/28/24 122.5 kg (270 lb 1 oz)   01/22/24 125.8 kg (277 lb 5.4 oz)   01/15/24 124.8 kg (275 lb 2.2 oz)   01/11/24 125.3 kg (276 lb 3.8 oz)   01/09/24 127.7 kg (281 lb 8.4 oz)       All other nutrition questions/concerns addressed as appropriate. Will continue to monitor prn throughout treatment.     Ciera Richardson, DAVIDN, LDN  02/19/2024  4:59 PM

## 2024-02-19 NOTE — PLAN OF CARE
Problem: Adult Inpatient Plan of Care  Goal: Plan of Care Review  Outcome: Ongoing, Progressing  Flowsheets (Taken 2/19/2024 1420)  Plan of Care Reviewed With:   patient   daughter  Goal: Patient-Specific Goal (Individualized)  Outcome: Ongoing, Progressing  Flowsheets (Taken 2/19/2024 1420)  Anxieties, Fears or Concerns: new treatment  Individualized Care Needs: recliner/warm blanket/snacks/education/daughter at [facility]     Problem: Fatigue  Goal: Improved Activity Tolerance  Outcome: Ongoing, Progressing  Intervention: Promote Improved Energy  Flowsheets (Taken 2/19/2024 1420)  Fatigue Management:   activity schedule adjusted   activity assistance provided   fatigue-related activity identified   frequent rest breaks encouraged   paced activity encouraged  Sleep/Rest Enhancement:   noise level reduced   reading promoted   regular sleep/rest pattern promoted   relaxation techniques promoted   room darkened  Activity Management:   Ambulated -L4   Ambulated to bathroom - L4   Ambulated in reyes - L4   Up in stretcher chair - L1   Pt tolerated Venofer/MVASI well today. NAD/no concerns voiced. Reviewed follow-up appointments. All questions were answered, ambulated independently at d/c with daughter.

## 2024-03-07 NOTE — PLAN OF CARE
Pt arrived to clinic today for Mvasi infusion and Venofer push and tolerated well. No changes throughout therapy. Pt aware of follow up appointments and side effects of drugs. Discharged to home. NAD.

## 2024-03-07 NOTE — PROGRESS NOTES
Oncology Nutrition   Gail Mckinnon   1970    Therapeutic Food Pantry     Pt eligible for Therapeutic Food Pantry . Order filled out with patient at chairside. All patient questions or concerns regarding  and/or nutrition status addressed. RD to continue to monitor and provide patient food while under active treatment PRN.     Food order filled by this RD- will provide to patient at end of infusion today.    Raquel Bullock MS, RD, LDN  03/07/2024  11:08 AM

## 2024-03-07 NOTE — PROGRESS NOTES
Oncology   Chemotherapy Infusion Visit    Quick Social Service Status Follow Up   Met w/ pt briefly to follow up on social and emotional needs. SW met with pt in private room. Pt shared with SW she was taken off the trail she was on and has been told she has 1 year to live. Pt said she found out in Jan and for the whole month of Jan was depressed and did not want to talk wit anyone. She said right now she is in an angry phase and is easily irritated.  SW validated pt's feelings and provided emotional support.    Pt did share that she was approved by [facility] for rental assistance. They will pay her rent from Jan-May.  Pt applied in December and has only recently been told she was approved. Pt said said this was good news that she needed. SW provided pt with a gas card today.     No other needs noted for SW at this time. SW encouraged pt to reach out to SW at any time if needs to talk or needs support. She agreed.            Radha Giordano, MEDINA  03/07/2024  10:12 AM

## 2024-03-07 NOTE — PROGRESS NOTES
Subjective     Patient ID: Gail Mckinnon is a 53 y.o. female.  The patient location is: home   The chief complaint leading to consultation is: colon cancer    Visit type: audiovisual    Notes:    Chief Complaint: Colon Cancer    Oncology History   Malignant neoplasm of sigmoid colon   3/16/2020 Initial Diagnosis     Malignant neoplasm of sigmoid colon      3/31/2020 Cancer Staged     Staging form: Colon and Rectum, AJCC 8th Edition  - Clinical stage from 3/31/2020: Stage IIIC (cT4b, cN2a, cM0)      5/6/2020 - 11/17/2020 Chemotherapy     Treatment Summary   Plan Name: OP FOLFOX 6 Q2W  Treatment Goal: Curative  Status: Inactive  Start Date: 5/6/2020  End Date: 11/6/2020  Provider: Dylan Leyva MD  Chemotherapy: fluorouraciL injection 945 mg, 400 mg/m2 = 945 mg, Intravenous, Clinic/[facility] 1 time, 14 of 14 cycles  Administration: 945 mg (5/6/2020), 945 mg (5/20/2020), 945 mg (6/3/2020), 945 mg (6/17/2020), 945 mg (7/29/2020), 945 mg (8/12/2020), 945 mg (8/26/2020), 945 mg (9/9/2020), 945 mg (9/23/2020), 945 mg (10/6/2020), 945 mg (10/21/2020), 945 mg (11/4/2020)  fluorouraciL 2,400 mg/m2 = 5,665 mg in sodium chloride 0.9% 240 mL chemo infusion, 2,400 mg/m2 = 5,665 mg, Intravenous, Over 46 hours, 14 of 14 cycles  Administration: 5,665 mg (5/6/2020), 5,665 mg (5/20/2020), 5,665 mg (6/3/2020), 5,665 mg (6/17/2020), 5,665 mg (7/29/2020), 5,665 mg (8/12/2020), 5,665 mg (8/26/2020), 5,665 mg (9/9/2020), 5,665 mg (9/23/2020), 5,665 mg (10/6/2020), 5,665 mg (10/21/2020), 5,665 mg (11/4/2020)  leucovorin calcium 900 mg in dextrose 5 % 250 mL infusion, 945 mg, Intravenous, Clinic/[facility] 1 time, 14 of 14 cycles  Administration: 900 mg (5/6/2020), 900 mg (5/20/2020), 900 mg (6/3/2020), 900 mg (6/17/2020), 945 mg (6/30/2020), 945 mg (7/20/2020), 945 mg (7/29/2020), 945 mg (8/12/2020), 945 mg (8/26/2020), 945 mg (9/9/2020), 945 mg (9/23/2020), 945 mg (10/6/2020), 945 mg (10/21/2020), 945 mg (11/4/2020)  oxaliplatin (ELOXATIN)  200 mg in dextrose 5 % 500 mL chemo infusion, 201 mg, Intravenous, [facility] 1 time, 6 of 6 cycles  Dose modification: 65 mg/m2 (original dose 85 mg/m2, Cycle 6)  Administration: 200 mg (5/6/2020), 200 mg (5/20/2020), 200 mg (6/3/2020), 200 mg (6/17/2020), 201 mg (6/30/2020), 150 mg (7/20/2020)      5/3/2021 Cancer Staged     Staging form: Colon and Rectum, AJCC 8th Edition  - Clinical stage from 5/3/2021: Stage Unknown (rcT0, cNX, cM1)      6/16/2021 - 8/2/2023 Chemotherapy     Treatment Summary   Plan Name: OP COLORECTAL FOLFIRI + BEVACIZUMAB Q2W  Treatment Goal: Control  Status: Inactive  Start Date: 6/16/2021  End Date: 8/2/2023  Provider: Marah Santo MD  Chemotherapy: fluorouraciL injection 990 mg, 400 mg/m2 = 990 mg, Intravenous, [facility] 1 time, 15 of 15 cycles  Administration: 990 mg (6/16/2021), 990 mg (6/30/2021), 990 mg (7/14/2021), 980 mg (7/28/2021), 990 mg (8/11/2021), 990 mg (8/25/2021), 990 mg (9/8/2021), 990 mg (9/22/2021), 990 mg (10/6/2021), 990 mg (10/20/2021), 990 mg (11/3/2021), 990 mg (12/1/2021), 990 mg (12/15/2021), 990 mg (11/17/2021), 990 mg (12/28/2021)  fluorouraciL 2,400 mg/m2 = 5,950 mg in sodium chloride 0.9% 240 mL chemo infusion, 2,400 mg/m2 = 5,950 mg, Intravenous, Over 46 hours, 43 of 46 cycles  Administration: 5,950 mg (6/16/2021), 5,950 mg (6/30/2021), 5,950 mg (7/14/2021), 5,880 mg (7/28/2021), 5,930 mg (8/11/2021), 5,930 mg (8/25/2021), 5,930 mg (9/8/2021), 5,930 mg (9/22/2021), 5,930 mg (10/6/2021), 5,930 mg (10/20/2021), 5,930 mg (11/3/2021), 5,930 mg (12/1/2021), 5,930 mg (12/15/2021), 5,930 mg (11/17/2021), 5,930 mg (12/28/2021), 6,050 mg (5/11/2022), 6,025 mg (3/9/2022), 6,025 mg (2/23/2022), 5,930 mg (2/2/2022), 5,930 mg (1/19/2022), 6,050 mg (4/20/2022), 6,025 mg (4/6/2022), 6,025 mg (3/23/2022), 6,050 mg (6/1/2022), 6,050 mg (6/15/2022), 6,050 mg (10/19/2022), 6,050 mg (10/5/2022), 6,050 mg (11/2/2022), 6,050 mg (11/16/2022), 6,050 mg (11/30/2022), 6,050 mg  (1/4/2023), 6,050 mg (12/19/2022), 6,050 mg (1/18/2023), 6,000 mg (5/22/2023), 6,000 mg (4/26/2023), 6,000 mg (4/11/2023), 6,000 mg (2/28/2023), 6,050 mg (2/14/2023), 6,000 mg (2/1/2023), 6,000 mg (7/5/2023), 6,000 mg (6/19/2023), 6,000 mg (6/5/2023), 6,000 mg (7/31/2023)  bevacizumab (AVASTIN) 600 mg in sodium chloride 0.9% 100 mL chemo infusion, 660 mg, Intravenous, Clinic/[facility] 1 time, 36 of 36 cycles  Dose modification: 5 mg/kg (original dose 5 mg/kg, Cycle 26, Reason: MD Discretion, Comment: 5 mg/kg original dose)  Administration: 600 mg (6/30/2021), 600 mg (7/14/2021), 600 mg (7/28/2021), 600 mg (6/16/2021), 600 mg (8/11/2021), 655 mg (8/25/2021), 600 mg (9/8/2021), 600 mg (9/22/2021), 600 mg (10/6/2021), 600 mg (10/20/2021), 600 mg (11/3/2021), 655 mg (12/1/2021), 600 mg (12/15/2021), 600 mg (11/17/2021), 600 mg (12/28/2021), 600 mg (5/11/2022), 600 mg (3/9/2022), 600 mg (2/23/2022), 600 mg (2/2/2022), 600 mg (1/19/2022), 600 mg (4/20/2022), 680 mg (4/6/2022), 600 mg (3/23/2022), 680 mg (10/5/2022), 680 mg (10/19/2022), 680 mg (11/2/2022), 680 mg (11/16/2022), 680 mg (11/30/2022), 680 mg (1/4/2023), 680 mg (12/19/2022), 680 mg (1/18/2023), 680 mg (3/28/2023), 680 mg (3/14/2023), 680 mg (2/28/2023), 680 mg (2/14/2023), 680 mg (2/1/2023)  irinotecan (CAMPTOSAR) 440 mg in sodium chloride 0.9% 500 mL chemo infusion, 446 mg, Intravenous, Clinic/[facility] 1 time, 45 of 48 cycles  Dose modification: 180 mg/m2 (original dose 180 mg/m2, Cycle 24)  Administration: 440 mg (6/16/2021), 440 mg (6/30/2021), 440 mg (7/14/2021), 440 mg (7/28/2021), 440 mg (8/11/2021), 444 mg (8/25/2021), 440 mg (9/8/2021), 440 mg (9/22/2021), 440 mg (10/6/2021), 440 mg (10/20/2021), 440 mg (11/3/2021), 440 mg (12/1/2021), 440 mg (12/15/2021), 440 mg (11/17/2021), 440 mg (12/28/2021), 440 mg (5/11/2022), 440 mg (3/9/2022), 440 mg (2/23/2022), 440 mg (2/2/2022), 440 mg (1/19/2022), 440 mg (4/20/2022), 440 mg (4/6/2022), 440 mg (3/24/2022), 440 mg  (6/1/2022), 440 mg (6/15/2022), 440 mg (10/19/2022), 440 mg (10/5/2022), 440 mg (11/2/2022), 440 mg (11/16/2022), 440 mg (11/30/2022), 440 mg (1/4/2023), 440 mg (12/19/2022), 440 mg (1/18/2023), 440 mg (5/22/2023), 440 mg (4/26/2023), 440 mg (4/11/2023), 440 mg (3/28/2023), 440 mg (3/14/2023), 440 mg (2/28/2023), 440 mg (2/14/2023), 440 mg (2/1/2023), 440 mg (7/5/2023), 440 mg (6/19/2023), 440 mg (6/5/2023), 440 mg (7/31/2023)  bevacizumab-awwb (MVASI) 5 mg/kg = 680 mg in sodium chloride 0.9% 100 mL infusion, 5 mg/kg = 680 mg (100 % of original dose 5 mg/kg), Intravenous, Clinic/[facility] 1 time, 7 of 10 cycles  Dose modification: 5 mg/kg (original dose 5 mg/kg, Cycle 39)  Administration: 680 mg (4/11/2023), 680 mg (4/26/2023), 680 mg (5/22/2023), 680 mg (7/5/2023), 680 mg (6/19/2023), 680 mg (6/5/2023), 680 mg (7/31/2023)      8/17/2023 - 8/18/2023 Chemotherapy     Treatment Summary   Plan Name: OP CETUXIMAB 500MG Q2W  Treatment Goal: Control  Status: Inactive  Start Date:   End Date:   Provider: Marah Santo MD  Chemotherapy: [No matching medication found in this treatment plan]      8/24/2023 - 12/29/2023 Chemotherapy     Treatment Summary   Plan Name: [facility] - ARM 1 ENCORAFENIB  CETUXIMAB NIVOLUMAB  Treatment Goal: Palliative  Status: Inactive  Start Date: 8/24/2023  End Date: 12/29/2023  Provider: Marah Santo MD  Chemotherapy: cetuximab (ERBITUX) 100 mg/50 mL chemo infusion 1,200 mg, 1,230 mg, Intravenous, Clinic/[facility] 1 time, 5 of 12 cycles  Administration: 1,200 mg (8/24/2023), 1,200 mg (9/8/2023), 1,200 mg (9/22/2023), 1,200 mg (10/6/2023), 1,200 mg (10/20/2023), 1,200 mg (11/3/2023), 1,200 mg (11/17/2023), 1,200 mg (12/1/2023), 1,200 mg (12/15/2023), 1,200 mg (12/29/2023)  encorafenib 75 mg Cap, 300 mg, Oral, Daily, 1 of 1 cycle, Start date: --, End date: --      2/19/2024 -  Chemotherapy     Treatment Summary   Plan Name: OP BEVACIZUMAB Q4W  Treatment Goal: Control  Status: Active  Start Date: 2/19/2024  "(Planned)  End Date: 1/6/2025 (Planned)  Provider: Marah Santo MD  Chemotherapy: bevacizumab-awwb (MVASI) 5 mg/kg = 625 mg in sodium chloride 0.9% 100 mL infusion, 5 mg/kg = 625 mg (100 % of original dose 5 mg/kg), Intravenous, [facility] 1 time, 0 of 12 cycles  Dose modification: 5 mg/kg (original dose 5 mg/kg, Cycle 1, Reason: Other (see comments), Comment: Given with Lonsurf)        PET scan in 12/2023: Progression of disease with multiple new and enlarging lymph nodes throughout the pelvis and mesentery along with new metastatic FDG avid mediastinal adenopathy as above.     CT chest, abdomen/pelvis on 1/4/2024: There is an increased but small non water density pericardial effusion. Mediastinal and hilar lymph nodes are largely stable as are multiple pulmonary nodules.. While the recist index lesions have not significantly changed in size there is increasing mesenteric and extraperitoneal adenopathy when compared to December 4, 2023.  There is also increasing 4th duodenum and proximal jejunal wall thickening and adjacent inflammation"    SUMMARY:  [id number] poorly differentiated adenocarcinoma, MSI stable, B Adán mutation positive   Currently stage IV   She completed adjuvant chemotherapy, 4 cycles of FOLFOX and the remainder infusional 5 FU, completed in November 2020. Oxaliplatin was stopped due to severe adverse reaction, initially noted itching, then ear closing.   Follow-up CTs and PET in May 2021 showed enlarging retroperitoneal node, concerning for metastatic disease.    She started FOLFIRI + Avastin continued till July 2022.   Given isolated RP toni disease, she underwent open Left periaortic and aortocaval lymph node dissection on 7/12/2022 at [facility]. Resumed FOLFIRI+ Debo with relatively stable disease but noted disease progression on scans in August 2023.     She enrolled into  SWOG 2107 (encorafenib/cetuximab + nivo) , initial scans showed improvement, second scans showed some new lesions, now " growing after a short term repeat.  As per Recist calculations, the target lesions are stable. However, there is  POD in the left paracolic gutter, seen on PET scan as well. She was taken off trial until 12/2023 .      She then started on next line of SOC treatment with Lonsurf and Avastin. Treatment delayed due to recent hospitalization for pancreatitis, likely immune therapy related, improving on steroids. Completed taper. Started Lonsurf on 2/12/2024    HPIShe comes in for Avastin. She is scheduled to start Lonsurf on 3/11/2024. Appetite is fair. Taste is off. Had mouth sores,   She denies any nausea, vomiting, diarrhea, constipation, abdominal pain, weight loss or loss of appetite, chest pain, shortness of breath, leg swelling, fatigue, pain, headache, dizziness, or mood changes. Her ECOG PS is zero.  She is by herself       Review of Systems   Constitutional:  Negative for appetite change and unexpected weight change.   HENT:  Positive for mouth sores.    Eyes:  Negative for visual disturbance.   Respiratory:  Negative for cough and shortness of breath.    Cardiovascular:  Negative for chest pain.   Gastrointestinal:  Negative for abdominal pain and diarrhea.   Genitourinary:  Negative for frequency.   Musculoskeletal:  Negative for back pain.   Integumentary:  Negative for rash.   Neurological:  Negative for headaches.   Hematological:  Negative for adenopathy.   Psychiatric/Behavioral:  The patient is nervous/anxious.           Objective     Physical Exam         LABS:  WBC   Date Value Ref Range Status   03/06/2024 2.86 (L) 3.90 - 12.70 K/uL Final     Hemoglobin   Date Value Ref Range Status   03/06/2024 13.0 12.0 - 16.0 g/dL Final     Hematocrit   Date Value Ref Range Status   03/06/2024 42.2 37.0 - 48.5 % Final     Platelets   Date Value Ref Range Status   03/06/2024 266 150 - 450 K/uL Final     Gran # (ANC)   Date Value Ref Range Status   03/06/2024 1.0 (L) 1.8 - 7.7 K/uL Final     Gran %   Date Value Ref  Range Status   03/06/2024 33.6 (L) 38.0 - 73.0 % Final       Chemistry        Component Value Date/Time     03/06/2024 1345    K 3.5 03/06/2024 1345     03/06/2024 1345    CO2 23 03/06/2024 1345    BUN 5 (L) 03/06/2024 1345    CREATININE 0.8 03/06/2024 1345     03/06/2024 1345        Component Value Date/Time    CALCIUM 9.9 03/06/2024 1345    ALKPHOS 225 (H) 03/06/2024 1345    AST 15 03/06/2024 1345    ALT 15 03/06/2024 1345    BILITOT 0.6 03/06/2024 1345    ESTGFRAFRICA >60 06/15/2022 1238    ESTGFRAFRICA >60 06/15/2022 1238    EGFRNONAA >60 06/15/2022 1238    EGFRNONAA >60 06/15/2022 1238        UA (3/6/2024): no protein   Assessment and Plan     1. 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 Immunodeficiency due to chemotherapy    4. Secondary liver cancer    5. Secondary malignant neoplasm of retroperitoneum  Overview:  Formatting of this note might be different from the original.  Added automatically from request for surgery [id number]      6. Oral mucositis  -     duke's soln (benadryl 30 mL, mylanta 30 mL, LIDOcaine 30 mL, nystatin 30 mL) 120mL; Mix equal amounts of benadryl, maalox, 2% viscous lidocaine and nystatin    Swish and swallow 10 ml 4 times a day  Dispense: 500 mL; Refill: 6      Route Chart for Scheduling    Med Onc Chart Routing  Urgent    Follow up with physician . Everything in [place]. Schedule labs for CBC on 3/11/2024. Cancel Venofer on 2/14. In 2 weeks from now schedule CBC,CMP, and see me and for Avastin and Venofer. Decouple labs   Follow up with TAVO    Infusion scheduling note    Injection scheduling note    Labs    Imaging    Pharmacy appointment    Other referrals            Treatment Plan Information   OP BEVACIZUMAB Q4W   Marah Santo MD   Upcoming Treatment Dates - OP BEVACIZUMAB Q4W    3/11/2024       Chemotherapy        bevacizumab-awwb (MVASI) 5 mg/kg = 625 mg in sodium chloride 0.9% 100 mL infusion  3/25/2024       Chemotherapy       bevacizumab-awwb (MVASI) 5 mg/kg = 625 mg in sodium chloride 0.9% 100 mL infusion  4/8/2024       Chemotherapy       bevacizumab-awwb (MVASI) 5 mg/kg = 625 mg in sodium chloride 0.9% 100 mL infusion  4/22/2024       Chemotherapy       bevacizumab-awwb (MVASI) 5 mg/kg = 625 mg in sodium chloride 0.9% 100 mL infusion    Supportive Plan Information  IV FLUIDS AND ELECTROLYTES   Brayden Solo MD   Upcoming Treatment Dates - IV FLUIDS AND ELECTROLYTES    No upcoming days in selected categories.    Therapy Plan Information  PORT FLUSH  Flushes  heparin, porcine (PF) 100 unit/mL injection flush 500 Units  500 Units, Intravenous, Every visit  sodium chloride 0.9% flush 10 mL  10 mL, Intravenous, Every visit    VENOFER (IRON SUCROSE) QW  Medications  iron sucrose injection 200 mg  200 mg, Intravenous, 1 time a week  Anaphylaxis/Hypersensitivity  EPINEPHrine (EPIPEN) 0.3 mg/0.3 mL pen injection 0.3 mg  0.3 mg, Intramuscular, PRN  diphenhydrAMINE injection 50 mg  50 mg, Intravenous, PRN  hydrocortisone sodium succinate injection 100 mg  100 mg, Intravenous, PRN  Flushes  sodium chloride 0.9% 250 mL flush bag  Intravenous, 1 time a week  sodium chloride 0.9% flush 10 mL  10 mL, Intravenous, 1 time a week  heparin, porcine (PF) 100 unit/mL injection flush 500 Units  500 Units, Intravenous, 1 time a week  alteplase injection 2 mg  2 mg, Intra-Catheter, 1 time a week    Mrs. Mckinnon we will proceed with Avastin today, she is scheduled to start Lonsurf on 03/11/2024.  Her ANC is 1000 K so will repeat CBC on 03/11 prior to proceeding with Lonsurf.  If she continues to have chemo induced neutropenia then we will have to add Neulasta every 2 weeks during the course of the treatment following the Avastin.  She will also continue with IV iron replacement on the days of the Avastin.  We will plan on restaging scans in  mid April 2024  Oral mucositis refilled [facility] solution    Visit today included increased complexity associated with the care of the episodic problem chemotherapy addressed and managing the longitudinal care of the patient due to the serious and/or complex managed problem(s)  colon cancer  Above plan reviewed with the patient and all of her questions were answered to her satisfaction

## 2024-03-18 NOTE — TELEPHONE ENCOUNTER
Placed call to pt, no answer left vm, asking her to check her BP/HR today, and to call clinic if not feeling well.  Yesterday's readin/105, hr 100.

## 2024-03-20 NOTE — PROGRESS NOTES
PATIENT: Gail Mckinnon  MRN: [id number]  DATE: 3/21/2024      Diagnosis:   1. Malignant neoplasm of sigmoid colon    2. 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 Immunodeficiency due to chemotherapy    4. Secondary liver cancer    5. Secondary malignant neoplasm of retroperitoneum    6. Generalized abdominal pain        Chief Complaint: Follow up (Malignant neoplasm of sigmoid colon)      Subjective:    Interval History: Ms. Mckinnon is a 53 y.o. female who returns for follow up for review of labs and clearance for c2 avastin. Complains of continued epigastric pain with eating. Pt was recently see in ED for epigastric pain. Pt reports burning epigastric pain when eating that is non radiating. Pain resolves after eating. Pt was discharged on bentyl, which she has not filled. Us and ct Reports nausea intermittently. States last bm was yesterday which was small, formed. Denies diarrhea, fever, sob, cp, palpitations, numbness, tingling.      Oncologic History:   [id number] poorly differentiated adenocarcinoma, MSI stable, B Adán mutation positive   Currently stage IV   She completed adjuvant chemotherapy, 4 cycles of FOLFOX and the remainder infusional 5 FU, completed in November 2020. Oxaliplatin was stopped due to severe adverse reaction, initially noted itching, then ear closing.   Follow-up CTs and PET in May 2021 showed enlarging retroperitoneal node, concerning for metastatic disease.    She started FOLFIRI + Avastin continued till July 2022.   Given isolated RP toni disease, she underwent open Left periaortic and aortocaval lymph node dissection on 7/12/2022 at [facility]. Resumed FOLFIRI+ Debo with relatively stable disease but noted disease progression on scans in August 2023.     She enrolled into  SWOG 2107 (encorafenib/cetuximab + nivo) , initial scans showed improvement, second scans showed some new lesions, now growing after a short term repeat.  As per Recist calculations, the target lesions are stable.  However, there is  POD in the left paracolic gutter, seen on PET scan as well. She was taken off trial until 12/2023 .      She then started on next line of SOC treatment with Lonsurf and Avastin. Treatment delayed due to recent hospitalization for pancreatitis, likely immune therapy related, improving on steroids. Completed taper. Started Lonsurf on 2/12/2024  Oncology History   Malignant neoplasm of sigmoid colon   3/16/2020 Initial Diagnosis    Malignant neoplasm of sigmoid colon     3/31/2020 Cancer Staged    Staging form: Colon and Rectum, AJCC 8th Edition  - Clinical stage from 3/31/2020: Stage IIIC (cT4b, cN2a, cM0)     5/6/2020 - 11/17/2020 Chemotherapy    Treatment Summary   Plan Name: OP FOLFOX 6 Q2W  Treatment Goal: Curative  Status: Inactive  Start Date: 5/6/2020  End Date: 11/6/2020  Provider: Dylan Leyva MD  Chemotherapy: fluorouraciL injection 945 mg, 400 mg/m2 = 945 mg, Intravenous, Clinic/[facility] 1 time, 14 of 14 cycles  Administration: 945 mg (5/6/2020), 945 mg (5/20/2020), 945 mg (6/3/2020), 945 mg (6/17/2020), 945 mg (7/29/2020), 945 mg (8/12/2020), 945 mg (8/26/2020), 945 mg (9/9/2020), 945 mg (9/23/2020), 945 mg (10/6/2020), 945 mg (10/21/2020), 945 mg (11/4/2020)  fluorouraciL 2,400 mg/m2 = 5,665 mg in sodium chloride 0.9% 240 mL chemo infusion, 2,400 mg/m2 = 5,665 mg, Intravenous, Over 46 hours, 14 of 14 cycles  Administration: 5,665 mg (5/6/2020), 5,665 mg (5/20/2020), 5,665 mg (6/3/2020), 5,665 mg (6/17/2020), 5,665 mg (7/29/2020), 5,665 mg (8/12/2020), 5,665 mg (8/26/2020), 5,665 mg (9/9/2020), 5,665 mg (9/23/2020), 5,665 mg (10/6/2020), 5,665 mg (10/21/2020), 5,665 mg (11/4/2020)  leucovorin calcium 900 mg in dextrose 5 % 250 mL infusion, 945 mg, Intravenous, Clinic/[facility] 1 time, 14 of 14 cycles  Administration: 900 mg (5/6/2020), 900 mg (5/20/2020), 900 mg (6/3/2020), 900 mg (6/17/2020), 945 mg (6/30/2020), 945 mg (7/20/2020), 945 mg (7/29/2020), 945 mg (8/12/2020), 945 mg (8/26/2020),  945 mg (9/9/2020), 945 mg (9/23/2020), 945 mg (10/6/2020), 945 mg (10/21/2020), 945 mg (11/4/2020)  oxaliplatin (ELOXATIN) 200 mg in dextrose 5 % 500 mL chemo infusion, 201 mg, Intravenous, Clinic/[facility] 1 time, 6 of 6 cycles  Dose modification: 65 mg/m2 (original dose 85 mg/m2, Cycle 6)  Administration: 200 mg (5/6/2020), 200 mg (5/20/2020), 200 mg (6/3/2020), 200 mg (6/17/2020), 201 mg (6/30/2020), 150 mg (7/20/2020)     5/3/2021 Cancer Staged    Staging form: Colon and Rectum, AJCC 8th Edition  - Clinical stage from 5/3/2021: Stage Unknown (rcT0, cNX, cM1)     6/16/2021 - 8/2/2023 Chemotherapy    Treatment Summary   Plan Name: OP COLORECTAL FOLFIRI + BEVACIZUMAB Q2W  Treatment Goal: Control  Status: Inactive  Start Date: 6/16/2021  End Date: 8/2/2023  Provider: Marah Santo MD  Chemotherapy: fluorouraciL injection 990 mg, 400 mg/m2 = 990 mg, Intravenous, Clinic/[facility] 1 time, 15 of 15 cycles  Administration: 990 mg (6/16/2021), 990 mg (6/30/2021), 990 mg (7/14/2021), 980 mg (7/28/2021), 990 mg (8/11/2021), 990 mg (8/25/2021), 990 mg (9/8/2021), 990 mg (9/22/2021), 990 mg (10/6/2021), 990 mg (10/20/2021), 990 mg (11/3/2021), 990 mg (12/1/2021), 990 mg (12/15/2021), 990 mg (11/17/2021), 990 mg (12/28/2021)  fluorouraciL 2,400 mg/m2 = 5,950 mg in sodium chloride 0.9% 240 mL chemo infusion, 2,400 mg/m2 = 5,950 mg, Intravenous, Over 46 hours, 43 of 46 cycles  Administration: 5,950 mg (6/16/2021), 5,950 mg (6/30/2021), 5,950 mg (7/14/2021), 5,880 mg (7/28/2021), 5,930 mg (8/11/2021), 5,930 mg (8/25/2021), 5,930 mg (9/8/2021), 5,930 mg (9/22/2021), 5,930 mg (10/6/2021), 5,930 mg (10/20/2021), 5,930 mg (11/3/2021), 5,930 mg (12/1/2021), 5,930 mg (12/15/2021), 5,930 mg (11/17/2021), 5,930 mg (12/28/2021), 6,050 mg (5/11/2022), 6,025 mg (3/9/2022), 6,025 mg (2/23/2022), 5,930 mg (2/2/2022), 5,930 mg (1/19/2022), 6,050 mg (4/20/2022), 6,025 mg (4/6/2022), 6,025 mg (3/23/2022), 6,050 mg (6/1/2022), 6,050 mg (6/15/2022), 6,050  mg (10/19/2022), 6,050 mg (10/5/2022), 6,050 mg (11/2/2022), 6,050 mg (11/16/2022), 6,050 mg (11/30/2022), 6,050 mg (1/4/2023), 6,050 mg (12/19/2022), 6,050 mg (1/18/2023), 6,000 mg (5/22/2023), 6,000 mg (4/26/2023), 6,000 mg (4/11/2023), 6,000 mg (2/28/2023), 6,050 mg (2/14/2023), 6,000 mg (2/1/2023), 6,000 mg (7/5/2023), 6,000 mg (6/19/2023), 6,000 mg (6/5/2023), 6,000 mg (7/31/2023)  bevacizumab (AVASTIN) 600 mg in sodium chloride 0.9% 100 mL chemo infusion, 660 mg, Intravenous, [facility] [place] [facility]\"" 1 time, 36 of 36 cycles  Dose modification: 5 mg/kg (original dose 5 mg/kg, Cycle 26, Reason: MD Discretion, Comment: 5 mg/kg original dose)  Administration: 600 mg (6/30/2021), 600 mg (7/14/2021), 600 mg (7/28/2021), 600 mg (6/16/2021), 600 mg (8/11/2021), 655 mg (8/25/2021), 600 mg (9/8/2021), 600 mg (9/22/2021), 600 mg (10/6/2021), 600 mg (10/20/2021), 600 mg (11/3/2021), 655 mg (12/1/2021), 600 mg (12/15/2021), 600 mg (11/17/2021), 600 mg (12/28/2021), 600 mg (5/11/2022), 600 mg (3/9/2022), 600 mg (2/23/2022), 600 mg (2/2/2022), 600 mg (1/19/2022), 600 mg (4/20/2022), 680 mg (4/6/2022), 600 mg (3/23/2022), 680 mg (10/5/2022), 680 mg (10/19/2022), 680 mg (11/2/2022), 680 mg (11/16/2022), 680 mg (11/30/2022), 680 mg (1/4/2023), 680 mg (12/19/2022), 680 mg (1/18/2023), 680 mg (3/28/2023), 680 mg (3/14/2023), 680 mg (2/28/2023), 680 mg (2/14/2023), 680 mg (2/1/2023)  irinotecan (CAMPTOSAR) 440 mg in sodium chloride 0.9% 500 mL chemo infusion, 446 mg, Intravenous, [facility]/\[facility] [place] [facility]\"" 1 time, 45 of 48 cycles  Dose modification: 180 mg/m2 (original dose 180 mg/m2, Cycle 24)  Administration: 440 mg (6/16/2021), 440 mg (6/30/2021), 440 mg (7/14/2021), 440 mg (7/28/2021), 440 mg (8/11/2021), 444 mg (8/25/2021), 440 mg (9/8/2021), 440 mg (9/22/2021), 440 mg (10/6/2021), 440 mg (10/20/2021), 440 mg (11/3/2021), 440 mg (12/1/2021), 440 mg (12/15/2021), 440 mg (11/17/2021), 440 mg (12/28/2021), 440 mg (5/11/2022), 440 mg (3/9/2022), 440 mg  (2/23/2022), 440 mg (2/2/2022), 440 mg (1/19/2022), 440 mg (4/20/2022), 440 mg (4/6/2022), 440 mg (3/24/2022), 440 mg (6/1/2022), 440 mg (6/15/2022), 440 mg (10/19/2022), 440 mg (10/5/2022), 440 mg (11/2/2022), 440 mg (11/16/2022), 440 mg (11/30/2022), 440 mg (1/4/2023), 440 mg (12/19/2022), 440 mg (1/18/2023), 440 mg (5/22/2023), 440 mg (4/26/2023), 440 mg (4/11/2023), 440 mg (3/28/2023), 440 mg (3/14/2023), 440 mg (2/28/2023), 440 mg (2/14/2023), 440 mg (2/1/2023), 440 mg (7/5/2023), 440 mg (6/19/2023), 440 mg (6/5/2023), 440 mg (7/31/2023)  bevacizumab-awwb (MVASI) 5 mg/kg = 680 mg in sodium chloride 0.9% 100 mL infusion, 5 mg/kg = 680 mg (100 % of original dose 5 mg/kg), Intravenous, Clinic/[facility] 1 time, 7 of 10 cycles  Dose modification: 5 mg/kg (original dose 5 mg/kg, Cycle 39)  Administration: 680 mg (4/11/2023), 680 mg (4/26/2023), 680 mg (5/22/2023), 680 mg (7/5/2023), 680 mg (6/19/2023), 680 mg (6/5/2023), 680 mg (7/31/2023)     8/17/2023 - 8/18/2023 Chemotherapy    Treatment Summary   Plan Name: OP CETUXIMAB 500MG Q2W  Treatment Goal: Control  Status: Inactive  Start Date:   End Date:   Provider: Marah Santo MD  Chemotherapy: [No matching medication found in this treatment plan]     8/24/2023 - 12/29/2023 Chemotherapy    Treatment Summary   Plan Name: [facility] - ARM 1 ENCORAFENIB  CETUXIMAB NIVOLUMAB  Treatment Goal: Palliative  Status: Inactive  Start Date: 8/24/2023  End Date: 12/29/2023  Provider: Marah Santo MD  Chemotherapy: cetuximab (ERBITUX) 100 mg/50 mL chemo infusion 1,200 mg, 1,230 mg, Intravenous, Clinic/[facility] 1 time, 5 of 12 cycles  Administration: 1,200 mg (8/24/2023), 1,200 mg (9/8/2023), 1,200 mg (9/22/2023), 1,200 mg (10/6/2023), 1,200 mg (10/20/2023), 1,200 mg (11/3/2023), 1,200 mg (11/17/2023), 1,200 mg (12/1/2023), 1,200 mg (12/15/2023), 1,200 mg (12/29/2023)  encorafenib 75 mg Cap, 300 mg, Oral, Daily, 1 of 1 cycle, Start date: --, End date: --     2/19/2024 -  Chemotherapy     Treatment Summary   Plan Name: OP BEVACIZUMAB Q4W  Treatment Goal: Control  Status: Active  Start Date: 2/19/2024  End Date: 1/13/2025 (Planned)  Provider: Marah Santo MD  Chemotherapy: bevacizumab-awwb (MVASI) 600 mg in sodium chloride 0.9% 100 mL infusion, 625 mg (100 % of original dose 5 mg/kg), Intravenous, Clinic/[facility] 1 time, 1 of 12 cycles  Dose modification: 5 mg/kg (original dose 5 mg/kg, Cycle 1, Reason: Other (see comments), Comment: Given with Lonsurf)  Administration: 600 mg (2/19/2024), 600 mg (3/7/2024)     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   4/3/2020 Initial Diagnosis    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     5/6/2020 - 11/17/2020 Chemotherapy    Treatment Summary   Plan Name: OP FOLFOX 6 Q2W  Treatment Goal: Curative  Status: Inactive  Start Date: 5/6/2020  End Date: 11/6/2020  Provider: Dylan Leyva MD  Chemotherapy: fluorouraciL injection 945 mg, 400 mg/m2 = 945 mg, Intravenous, Clinic/[facility] 1 time, 14 of 14 cycles  Administration: 945 mg (5/6/2020), 945 mg (5/20/2020), 945 mg (6/3/2020), 945 mg (6/17/2020), 945 mg (7/29/2020), 945 mg (8/12/2020), 945 mg (8/26/2020), 945 mg (9/9/2020), 945 mg (9/23/2020), 945 mg (10/6/2020), 945 mg (10/21/2020), 945 mg (11/4/2020)  fluorouraciL 2,400 mg/m2 = 5,665 mg in sodium chloride 0.9% 240 mL chemo infusion, 2,400 mg/m2 = 5,665 mg, Intravenous, Over 46 hours, 14 of 14 cycles  Administration: 5,665 mg (5/6/2020), 5,665 mg (5/20/2020), 5,665 mg (6/3/2020), 5,665 mg (6/17/2020), 5,665 mg (7/29/2020), 5,665 mg (8/12/2020), 5,665 mg (8/26/2020), 5,665 mg (9/9/2020), 5,665 mg (9/23/2020), 5,665 mg (10/6/2020), 5,665 mg (10/21/2020), 5,665 mg (11/4/2020)  leucovorin calcium 900 mg in dextrose 5 % 250 mL infusion, 945 mg, Intravenous, Clinic/[facility] 1 time, 14 of 14 cycles  Administration: 900 mg (5/6/2020), 900 mg (5/20/2020), 900 mg (6/3/2020), 900 mg (6/17/2020), 945 mg (6/30/2020), 945 mg (7/20/2020), 945 mg (7/29/2020), 945 mg (8/12/2020), 945 mg  (8/26/2020), 945 mg (9/9/2020), 945 mg (9/23/2020), 945 mg (10/6/2020), 945 mg (10/21/2020), 945 mg (11/4/2020)  oxaliplatin (ELOXATIN) 200 mg in dextrose 5 % 500 mL chemo infusion, 201 mg, Intravenous, Clinic/[facility] 1 time, 6 of 6 cycles  Dose modification: 65 mg/m2 (original dose 85 mg/m2, Cycle 6)  Administration: 200 mg (5/6/2020), 200 mg (5/20/2020), 200 mg (6/3/2020), 200 mg (6/17/2020), 201 mg (6/30/2020), 150 mg (7/20/2020)     6/16/2021 - 8/2/2023 Chemotherapy    Treatment Summary   Plan Name: OP COLORECTAL FOLFIRI + BEVACIZUMAB Q2W  Treatment Goal: Control  Status: Inactive  Start Date: 6/16/2021  End Date: 8/2/2023  Provider: Marha Santo MD  Chemotherapy: fluorouraciL injection 990 mg, 400 mg/m2 = 990 mg, Intravenous, Clinic/[facility] 1 time, 15 of 15 cycles  Administration: 990 mg (6/16/2021), 990 mg (6/30/2021), 990 mg (7/14/2021), 980 mg (7/28/2021), 990 mg (8/11/2021), 990 mg (8/25/2021), 990 mg (9/8/2021), 990 mg (9/22/2021), 990 mg (10/6/2021), 990 mg (10/20/2021), 990 mg (11/3/2021), 990 mg (12/1/2021), 990 mg (12/15/2021), 990 mg (11/17/2021), 990 mg (12/28/2021)  fluorouraciL 2,400 mg/m2 = 5,950 mg in sodium chloride 0.9% 240 mL chemo infusion, 2,400 mg/m2 = 5,950 mg, Intravenous, Over 46 hours, 43 of 46 cycles  Administration: 5,950 mg (6/16/2021), 5,950 mg (6/30/2021), 5,950 mg (7/14/2021), 5,880 mg (7/28/2021), 5,930 mg (8/11/2021), 5,930 mg (8/25/2021), 5,930 mg (9/8/2021), 5,930 mg (9/22/2021), 5,930 mg (10/6/2021), 5,930 mg (10/20/2021), 5,930 mg (11/3/2021), 5,930 mg (12/1/2021), 5,930 mg (12/15/2021), 5,930 mg (11/17/2021), 5,930 mg (12/28/2021), 6,050 mg (5/11/2022), 6,025 mg (3/9/2022), 6,025 mg (2/23/2022), 5,930 mg (2/2/2022), 5,930 mg (1/19/2022), 6,050 mg (4/20/2022), 6,025 mg (4/6/2022), 6,025 mg (3/23/2022), 6,050 mg (6/1/2022), 6,050 mg (6/15/2022), 6,050 mg (10/19/2022), 6,050 mg (10/5/2022), 6,050 mg (11/2/2022), 6,050 mg (11/16/2022), 6,050 mg (11/30/2022), 6,050 mg (1/4/2023),  6,050 mg (12/19/2022), 6,050 mg (1/18/2023), 6,000 mg (5/22/2023), 6,000 mg (4/26/2023), 6,000 mg (4/11/2023), 6,000 mg (2/28/2023), 6,050 mg (2/14/2023), 6,000 mg (2/1/2023), 6,000 mg (7/5/2023), 6,000 mg (6/19/2023), 6,000 mg (6/5/2023), 6,000 mg (7/31/2023)  bevacizumab (AVASTIN) 600 mg in sodium chloride 0.9% 100 mL chemo infusion, 660 mg, Intravenous, [facility] 1 time, 36 of 36 cycles  Dose modification: 5 mg/kg (original dose 5 mg/kg, Cycle 26, Reason: MD Discretion, Comment: 5 mg/kg original dose)  Administration: 600 mg (6/30/2021), 600 mg (7/14/2021), 600 mg (7/28/2021), 600 mg (6/16/2021), 600 mg (8/11/2021), 655 mg (8/25/2021), 600 mg (9/8/2021), 600 mg (9/22/2021), 600 mg (10/6/2021), 600 mg (10/20/2021), 600 mg (11/3/2021), 655 mg (12/1/2021), 600 mg (12/15/2021), 600 mg (11/17/2021), 600 mg (12/28/2021), 600 mg (5/11/2022), 600 mg (3/9/2022), 600 mg (2/23/2022), 600 mg (2/2/2022), 600 mg (1/19/2022), 600 mg (4/20/2022), 680 mg (4/6/2022), 600 mg (3/23/2022), 680 mg (10/5/2022), 680 mg (10/19/2022), 680 mg (11/2/2022), 680 mg (11/16/2022), 680 mg (11/30/2022), 680 mg (1/4/2023), 680 mg (12/19/2022), 680 mg (1/18/2023), 680 mg (3/28/2023), 680 mg (3/14/2023), 680 mg (2/28/2023), 680 mg (2/14/2023), 680 mg (2/1/2023)  irinotecan (CAMPTOSAR) 440 mg in sodium chloride 0.9% 500 mL chemo infusion, 446 mg, Intravenous, Clinic/[facility] 1 time, 45 of 48 cycles  Dose modification: 180 mg/m2 (original dose 180 mg/m2, Cycle 24)  Administration: 440 mg (6/16/2021), 440 mg (6/30/2021), 440 mg (7/14/2021), 440 mg (7/28/2021), 440 mg (8/11/2021), 444 mg (8/25/2021), 440 mg (9/8/2021), 440 mg (9/22/2021), 440 mg (10/6/2021), 440 mg (10/20/2021), 440 mg (11/3/2021), 440 mg (12/1/2021), 440 mg (12/15/2021), 440 mg (11/17/2021), 440 mg (12/28/2021), 440 mg (5/11/2022), 440 mg (3/9/2022), 440 mg (2/23/2022), 440 mg (2/2/2022), 440 mg (1/19/2022), 440 mg (4/20/2022), 440 mg (4/6/2022), 440 mg (3/24/2022), 440 mg (6/1/2022), 440  mg (6/15/2022), 440 mg (10/19/2022), 440 mg (10/5/2022), 440 mg (11/2/2022), 440 mg (11/16/2022), 440 mg (11/30/2022), 440 mg (1/4/2023), 440 mg (12/19/2022), 440 mg (1/18/2023), 440 mg (5/22/2023), 440 mg (4/26/2023), 440 mg (4/11/2023), 440 mg (3/28/2023), 440 mg (3/14/2023), 440 mg (2/28/2023), 440 mg (2/14/2023), 440 mg (2/1/2023), 440 mg (7/5/2023), 440 mg (6/19/2023), 440 mg (6/5/2023), 440 mg (7/31/2023)  bevacizumab-awwb (MVASI) 5 mg/kg = 680 mg in sodium chloride 0.9% 100 mL infusion, 5 mg/kg = 680 mg (100 % of original dose 5 mg/kg), Intravenous, Clinic/[facility] 1 time, 7 of 10 cycles  Dose modification: 5 mg/kg (original dose 5 mg/kg, Cycle 39)  Administration: 680 mg (4/11/2023), 680 mg (4/26/2023), 680 mg (5/22/2023), 680 mg (7/5/2023), 680 mg (6/19/2023), 680 mg (6/5/2023), 680 mg (7/31/2023)     8/17/2023 - 8/18/2023 Chemotherapy    Treatment Summary   Plan Name: OP CETUXIMAB 500MG Q2W  Treatment Goal: Control  Status: Inactive  Start Date:   End Date:   Provider: Marah Santo MD  Chemotherapy: [No matching medication found in this treatment plan]     8/24/2023 - 12/29/2023 Chemotherapy    Treatment Summary   Plan Name: [facility] - ARM 1 ENCORAFENIB  CETUXIMAB NIVOLUMAB  Treatment Goal: Palliative  Status: Inactive  Start Date: 8/24/2023  End Date: 12/29/2023  Provider: Marah Santo MD  Chemotherapy: cetuximab (ERBITUX) 100 mg/50 mL chemo infusion 1,200 mg, 1,230 mg, Intravenous, [facility] [place] 1 time, 5 of 12 cycles  Administration: 1,200 mg (8/24/2023), 1,200 mg (9/8/2023), 1,200 mg (9/22/2023), 1,200 mg (10/6/2023), 1,200 mg (10/20/2023), 1,200 mg (11/3/2023), 1,200 mg (11/17/2023), 1,200 mg (12/1/2023), 1,200 mg (12/15/2023), 1,200 mg (12/29/2023)  encorafenib 75 mg Cap, 300 mg, Oral, Daily, 1 of 1 cycle, Start date: --, End date: --     2/19/2024 -  Chemotherapy    Treatment Summary   Plan Name: OP BEVACIZUMAB Q4W  Treatment Goal: Control  Status: Active  Start Date: 2/19/2024  End Date: 1/13/2025  (Planned)  Provider: Marah Santo MD  Chemotherapy: bevacizumab-awwb (MVASI) 600 mg in sodium chloride 0.9% 100 mL infusion, 625 mg (100 % of original dose 5 mg/kg), Intravenous, Clinic/[facility] 1 time, 1 of 12 cycles  Dose modification: 5 mg/kg (original dose 5 mg/kg, Cycle 1, Reason: Other (see comments), Comment: Given with Lonsurf)  Administration: 600 mg (2024), 600 mg (3/7/2024)         Past Medical History:   Past Medical History:   Diagnosis Date    Anxiety     Depression     FH: ovarian cancer 2020    Hx of psychiatric care     Effexor, Paxil, Lexapro, Zoloft, Wellbutrin, Trazodone Buspar    Hypertension     Hyperthyroidism     Hypothyroid     Kidney calculi     Malignant neoplasm of sigmoid colon 2020    Menorrhagia     Multinodular goiter 2012    Palpitation     Psychiatric problem     Venous insufficiency     Vulvar lesion        Past Surgical HIstory:   Past Surgical History:   Procedure Laterality Date     SECTION, CLASSIC      x3    COLONOSCOPY N/A 2020    Procedure: COLONOSCOPY;  Surgeon: Shane Parker MD;  Location: [facility];  Service: Endoscopy;  Laterality: N/A;    COLONOSCOPY N/A 2022    Procedure: COLONOSCOPY;  Surgeon: Shane Parker MD;  Location: [facility];  Service: Endoscopy;  Laterality: N/A;    COLONOSCOPY N/A 2023    Procedure: COLONOSCOPY;  Surgeon: Shane Parker MD;  Location: [facility];  Service: Endoscopy;  Laterality: N/A;  no cecum anastomosis    CYSTOSCOPY W/ URETERAL STENT PLACEMENT Bilateral 2020    Procedure: CYSTOSCOPY, WITH URETERAL STENT INSERTION;  Surgeon: Claudio Tyson MD;  Location: [address];  Service: Urology;  Laterality: Bilateral;    EXAMINATION UNDER ANESTHESIA N/A 2024    Procedure: Exam under anesthesia-vagina;  Surgeon: Eli Her MD;  Location: [facility];  Service: OB/GYN;  Laterality: N/A;    EXCISION-WIDE LOCAL Left 2024    Procedure: EXCISION-WIDE LOCAL -  Labia  Majora;  Surgeon: Eli Her MD;  Location: [facility];  Service: OB/GYN;  Laterality: Left;  upper left side of labia majora    EXCISIONAL BIOPSY, LYMPH NODE  07/2022    abdominal x 4    INSERTION OF TUNNELED CENTRAL VENOUS CATHETER (CVC) WITH SUBCUTANEOUS PORT N/A 05/01/2020    Procedure: [id number];  Surgeon: Stephen Diagnostic Provider;  Location: [facility] 2ND FLR;  Service: Radiology;  Laterality: N/A;  Room 189/Cindy    LAPAROSCOPIC SIGMOIDECTOMY N/A 03/25/2020    Procedure: COLECTOMY, SIGMOID, LAPAROSCOPIC, flex sig, ERAS high;  Surgeon: Silvio Man MD;  Location: [facility] 2ND FLR;  Service: Colon and Rectal;  Laterality: N/A;    MOBILIZATION OF SPLENIC FLEXURE  03/25/2020    Procedure: MOBILIZATION, SPLENIC FLEXURE;  Surgeon: Silvio Man MD;  Location: [facility] OR 2ND FLR;  Service: Colon and Rectal;;    TONSILLECTOMY      TOTAL ABDOMINAL HYSTERECTOMY W/ BILATERAL SALPINGOOPHORECTOMY N/A 03/25/2020    Procedure: HYSTERECTOMY, TOTAL, ABDOMINAL, WITH BILATERAL SALPINGO-OOPHORECTOMY;  Surgeon: Keron Brady MD;  Location: [facility] OR 2ND FLR;  Service: Oncology;  Laterality: N/A;       Family History:   Family History   Problem Relation Age of Onset    Heart disease Father     Diabetes Mother 65    Drug abuse Brother     Drug abuse Brother     Cancer Maternal Aunt         lung cancer    Cancer Maternal Grandmother         stomach cancer- started in ovaries    Ovarian cancer Maternal Grandmother         stomach cancer- started in ovaries    Colon cancer Paternal Uncle     Cancer Paternal Uncle     Ovarian cancer Maternal Aunt     Ovarian cancer Maternal Aunt     Ovarian cancer Cousin         mother had ovarian cancer    Drug abuse Son     Drug abuse Son         clean/sober since 2012    Colon cancer Son     No Known Problems Daughter     Uterine cancer Neg Hx     Breast cancer Neg Hx        Social History:  reports that she has never smoked. She has never used smokeless tobacco. She  reports current alcohol use. She reports that she does not use drugs.    Allergies:  Review of patient's allergies indicates:   Allergen Reactions    Oxaliplatin Other (See Comments)     Itchy hands, throat closed up, trouble hearing - during infusion    Penicillins Hives and Rash     childhood allergy         Medications:  Current Outpatient Medications   Medication Sig Dispense Refill    acetaminophen (TYLENOL) 500 MG tablet Take 2 tablets (1,000 mg total) by mouth every 8 (eight) hours. 60 tablet 0    buPROPion (WELLBUTRIN SR) 150 MG TBSR 12 hr tablet Take 1 tablet (150 mg total) by mouth 2 (two) times daily. 180 tablet 0    cinacalcet (SENSIPAR) 30 MG Tab Take 1 tablet (30 mg total) by mouth daily with breakfast. 30 tablet 11    dicyclomine (BENTYL) 20 mg tablet Take 1 tablet (20 mg total) by mouth 2 (two) times daily. for 10 days 20 tablet 0    DULoxetine (CYMBALTA) 30 MG capsule Take 2 capsules (60 mg total) by mouth once daily. 60 capsule 0    Lactobacillus rhamnosus GG (CULTURELLE) 10 billion cell capsule Take 1 capsule by mouth once daily.      lactulose (CHRONULAC) 10 gram/15 mL solution Take 15 mLs (10 g total) by mouth every 6 (six) hours as needed (constipation). 150 mL 1    levothyroxine (SYNTHROID) 175 MCG tablet Take 2 tablets (350 mcg total) by mouth before breakfast. 60 tablet 11    LIDOcaine-prilocaine (EMLA) cream Apply topically as needed (30 to 60 min prior chemo or port use). 30 g 3    LORazepam (ATIVAN) 1 MG tablet Take 1 tablet (1 mg total) by mouth every 12 (twelve) hours as needed for Anxiety (nausea). 30 tablet 0    magic mouthwash diphen/antac/lidoc/nysta Swish and swallow 10 ml by mouth 4 times daily 500 mL 6    mirtazapine (REMERON) 7.5 MG Tab Take 1 tablet (7.5 mg total) by mouth every evening. (Patient taking differently: Take 7.5 mg by mouth nightly as needed.) 30 tablet 0    multivitamin (THERAGRAN) per tablet Take 1 tablet by mouth once daily.      olmesartan (BENICAR) 20 MG  tablet Take 1 tablet (20 mg total) by mouth once daily. 30 tablet 5    ondansetron (ZOFRAN-ODT) 4 MG TbDL Take 1 tablet (4 mg total) by mouth every 8 (eight) hours as needed (Nausea). 20 tablet 0    ondansetron (ZOFRAN-ODT) 8 MG TbDL Take 1 tablet (8 mg total) by mouth every 8 (eight) hours as needed. 60 tablet 3    oxyCODONE (ROXICODONE) 5 MG immediate release tablet Take 1 tablet (5 mg total) by mouth every 4 (four) hours as needed for Pain (Abdominal pain). 8 tablet 0    prochlorperazine (COMPAZINE) 10 MG tablet Take 1 tablet (10 mg total) by mouth every 6 (six) hours as needed. 20 tablet 0    senna (SENOKOT) 8.6 mg tablet Take 2 tablets by mouth once daily. 60 tablet 2    trifluridine-tipiraciL (LONSURF) 20-8.19 mg Tab Take 4 tablets by mouth 2 times daily on days 1 to 5 and days 8 to 12 of a 28-day cycle. 80 tablet 3    famotidine (PEPCID) 40 MG tablet Take 1 tablet (40 mg total) by mouth once daily. 30 tablet 11    levoFLOXacin (LEVAQUIN) 750 MG tablet Take 1 tablet (750 mg total) by mouth once daily. **NEW RX NOT STARTED YET** (Patient not taking: Reported on 3/21/2024)      metroNIDAZOLE (FLAGYL) 500 MG tablet Take 1 tablet (500 mg total) by mouth every 8 (eight) hours. **NEW RX NOT STARTED YET** (Patient not taking: Reported on 3/21/2024)      trifluridine-tipiraciL (LONSURF) 20-8.19 mg Tab Take 35 mg/m2 by mouth 2 (two) times daily **NEW RX NOT STARTED YET**. (Patient not taking: Reported on 3/21/2024.)       No current facility-administered medications for this visit.     Facility-Administered Medications Ordered in Other Visits   Medication Dose Route Frequency Provider Last Rate Last Admin    iron sucrose injection 200 mg  200 mg Intravenous 1 time in Clinic/HOD Alma Villanueva MD        ondansetron injection 4 mg  4 mg Intravenous Daily PRN Volpi-Abadie, Jacqueline, MD        sodium chloride 0.9% 250 mL flush bag   Intravenous PRN Alma Villanueva MD        sodium chloride 0.9% flush 10 mL  10 mL  "Intravenous PRN Alma Villanueva MD        sodium chloride 0.9% flush 3 mL  3 mL Intravenous PRN Volpi-Abadie, Jacqueline, MD           Review of Systems   Constitutional:  Negative for appetite change, chills, fatigue and fever.   HENT: Negative.     Respiratory:  Negative for cough, choking, chest tightness, shortness of breath and wheezing.    Cardiovascular:  Negative for chest pain, palpitations and leg swelling.   Gastrointestinal:  Positive for abdominal pain and nausea. Negative for constipation, diarrhea and vomiting.   Endocrine: Negative.    Genitourinary: Negative.    Musculoskeletal:  Positive for myalgias.   Skin: Negative.    Neurological: Negative.    Hematological: Negative.    Psychiatric/Behavioral: Negative.             Objective:      Vitals:   Vitals:    03/21/24 0822   BP: (!) 159/97   BP Location: Left arm   Patient Position: Sitting   BP Method: Medium (Automatic)   Pulse: 91   Resp: 18   Temp: 97 °F (36.1 °C)   TempSrc: Temporal   SpO2: 96%   Weight: 120.4 kg (265 lb 6.9 oz)   Height: 5' 6" (1.676 m)     BMI: Body mass index is 42.84 kg/m².    Physical Exam  HENT:      Head: Normocephalic.      Nose: Nose normal.      Mouth/Throat:      Mouth: Mucous membranes are moist.      Pharynx: Oropharynx is clear.   Eyes:      Pupils: Pupils are equal, round, and reactive to light.   Cardiovascular:      Rate and Rhythm: Normal rate and regular rhythm.      Heart sounds: Normal heart sounds.   Pulmonary:      Effort: Pulmonary effort is normal.      Breath sounds: Normal breath sounds.   Abdominal:      General: Bowel sounds are normal.      Tenderness: There is abdominal tenderness (generalized).   Musculoskeletal:         General: Normal range of motion.      Cervical back: Normal range of motion.   Skin:     General: Skin is warm and dry.   Neurological:      Mental Status: She is alert and oriented to person, place, and time.   Psychiatric:         Mood and Affect: Mood normal.         " Behavior: Behavior normal.         Laboratory Data:  Recent Results (from the past 24 hour(s))   CMP    Collection Time: 03/20/24 12:18 PM   Result Value Ref Range    Sodium 144 136 - 145 mmol/L    Potassium 3.4 (L) 3.5 - 5.1 mmol/L    Chloride 109 95 - 110 mmol/L    CO2 25 23 - 29 mmol/L    Glucose 118 (H) 70 - 110 mg/dL    BUN 12 6 - 20 mg/dL    Creatinine 1.5 (H) 0.5 - 1.4 mg/dL    Calcium 10.0 8.7 - 10.5 mg/dL    Total Protein 6.9 6.0 - 8.4 g/dL    Albumin 3.3 (L) 3.5 - 5.2 g/dL    Total Bilirubin 0.5 0.1 - 1.0 mg/dL    Alkaline Phosphatase 149 (H) 55 - 135 U/L    AST 18 10 - 40 U/L    ALT 11 10 - 44 U/L    eGFR 40.9 (A) >60 mL/min/1.73 m^2    Anion Gap 10 8 - 16 mmol/L   Urinalysis    Collection Time: 03/21/24  9:10 AM   Result Value Ref Range    Specimen UA Urine, Clean Catch     Color, UA Yellow Yellow, Straw, Jennifer    Appearance, UA Clear Clear    pH, UA 6.0 5.0 - 8.0    Specific Gravity, UA <=1.005 (A) 1.005 - 1.030    Protein, UA Negative Negative    Glucose, UA Negative Negative    Ketones, UA Negative Negative    Bilirubin (UA) Negative Negative    Occult Blood UA Trace (A) Negative    Nitrite, UA Negative Negative    Leukocytes, UA Negative Negative   CBC w/ DIFF    Collection Time: 03/21/24  9:14 AM   Result Value Ref Range    WBC 5.49 3.90 - 12.70 K/uL    RBC 5.12 4.00 - 5.40 M/uL    Hemoglobin 13.3 12.0 - 16.0 g/dL    Hematocrit 41.1 37.0 - 48.5 %    MCV 80 (L) 82 - 98 fL    MCH 26.0 (L) 27.0 - 31.0 pg    MCHC 32.4 32.0 - 36.0 g/dL    RDW 24.0 (H) 11.5 - 14.5 %    Platelets 276 150 - 450 K/uL    MPV 9.0 (L) 9.2 - 12.9 fL    Immature Granulocytes 0.5 0.0 - 0.5 %    Gran # (ANC) 4.2 1.8 - 7.7 K/uL    Immature Grans (Abs) 0.03 0.00 - 0.04 K/uL    Lymph # 1.0 1.0 - 4.8 K/uL    Mono # 0.2 (L) 0.3 - 1.0 K/uL    Eos # 0.0 0.0 - 0.5 K/uL    Baso # 0.03 0.00 - 0.20 K/uL    nRBC 0 0 /100 WBC    Gran % 77.1 (H) 38.0 - 73.0 %    Lymph % 18.0 18.0 - 48.0 %    Mono % 3.5 (L) 4.0 - 15.0 %    Eosinophil % 0.4 0.0 -  8.0 %    Basophil % 0.5 0.0 - 1.9 %    Differential Method Automated         Imaging:   US LOWER EXTREMITY VEINS BILATERAL  3/20/2024     CLINICAL HISTORY:  Edema, unspecified     TECHNIQUE:  Duplex and color flow Doppler and dynamic compression was performed of the bilateral lower extremity veins was performed.     COMPARISON:  None     FINDINGS:  Right thigh veins: The common femoral, femoral, popliteal, upper greater saphenous, and deep femoral veins are patent and free of thrombus. The veins are normally compressible and have normal phasic flow and augmentation response.     Right calf veins: The visualized calf veins are patent.     Left thigh veins: The common femoral, femoral, popliteal, upper greater saphenous, and deep femoral veins are patent and free of thrombus. The veins are normally compressible and have normal phasic flow and augmentation response.     Left calf veins: The visualized calf veins are patent.     Miscellaneous: None     Impression:     No evidence of acute deep venous thrombosis in the left or right lower extremity veins.  _________________________________________________________________________  US ABDOMEN LIMITED  3/11/2024    CLINICAL HISTORY:  Right upper quadrant abdominal pain.     TECHNIQUE:  Limited ultrasound of the right upper quadrant of the abdomen was performed.     COMPARISON:  Ultrasound 02/13/2024.  CT 03/11/2024     FINDINGS:  The liver measures 21.7 cm.  There is mildly increased echogenicity compatible with steatosis..    Main portal vein demonstrates hepatopetal flow.  Pancreas is obscured by bowel gas.  Distal abdominal aorta is obscured by bowel gas.  Visualized IVC is within normal limits.  Gallbladder demonstrates mobile gallstones.  No wall thickening, pericholecystic fluid, or sonographic Govea's sign noted.  The common bile duct measures 3 millimeters.  Right kidney measures 13.4 x 6.4 x 6.7 cm.  There is mild hydronephrosis. There are nonobstructing echogenic  calculi within the right kidney.  Mildly enlarged lymph node superior to the pancreas noted measuring 2.0 x 1.8 x 1.1 cm.     Impression:     1. Hepatomegaly with mild steatosis.  2. Cholelithiasis without evidence of acute cholecystitis.  No biliary ductal dilatation.  3. Mild right-sided hydronephrosis.  Nonobstructing right renal calculi noted.  ______________________________________________________  CT ABDOMEN PELVIS WITH IV CONTRAST  3/11/2024     CLINICAL HISTORY:  Upper abdominal pain, nausea, vomiting.  History of colon cancer.  History of pancreatitis.     TECHNIQUE:  Axial CT images of the abdomen and pelvis were obtained with intravenous contrast. No oral contrast.  Coronal and sagittal reformations were obtained. Automated exposure control was utilized. Total exam DLP is 2105 mGy cm.     COMPARISON:  01/27/2024     FINDINGS:  There is minimal dependent atelectasis at the lung bases.  There are several calcified gallstones in a normal caliber gallbladder.  No pericholecystic inflammatory changes are noted.  There is mild hepatic steatosis.  There is mesenteric edema again demonstrated with mildly prominent mesenteric lymph nodes decreased in size from the previous exam.  There is decrease stranding about the pancreas which could reflect mild pancreatitis or resolving pancreatitis.  Spleen is not enlarged.  The adrenal glands appear unremarkable.  There is mild bilateral hydroureteronephrosis without obstructing calculus identified.  There are nonobstructing bilateral renal calculi present.  Sigmoid colonic mass resection changes noted.  There is no bowel obstruction.  The appendix appears within normal limits.  Fluid-filled mildly prominent jejunal loops noted with wall thickening may reflect enteritis.  Urinary bladder is only partially filled.  There is no intraperitoneal free air.  Mildly prominent left inguinal lymph nodes again identified.  Mildly prominent retroperitoneal lymph nodes again  identified.  The abdominal aorta is not aneurysmal.  No acute displaced fractures identified.     Impression:     1. Mesenteric edema again demonstrated with prominent mesenteric lymph nodes though decreased in size from prior.  Peripancreatic edema appears decreased which could reflect mild pancreatitis or resolving pancreatitis.  2. Mildly dilated jejunal loops with wall thickening may reflect enteritis.  3. Mild bilateral hydroureteronephrosis without obstructing calculi identified.  There are nonobstructing bilateral renal calculi present.  4. Additional findings as above       Assessment:       1. Malignant neoplasm of sigmoid colon    2. 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 Immunodeficiency due to chemotherapy    4. Secondary liver cancer    5. Secondary malignant neoplasm of retroperitoneum    6. 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 Oxaliplatin was stopped due to severe adverse reaction.  -Follow-up CTs and PET in May 2021 showed enlarging retroperitoneal node, concerning for metastatic disease.   -She started FOLFIRI + Avastin and has continued till July 2022.   Given isolated RP toni disease, she has undergone open Left periaortic and aortocaval lymph node dissection on 7/12/2022 at [facility]. Resumed FOLFIRI+ Debo with relatively stable disease but now has disease progression on scans in August 2023.   -She has been enrolled into  SWOG 2107 (encorafenib/cetuximab + nivo) , initial scans showed improvement, second scans showed some new lesions, now growing after a short term repeat.  As per Recist calculations, the target lesions are stable. However, there is  POD in the left paracolic gutter, seen on PET scan as well. She was taken off  trial.    -Has initiated next line of SOC treatment with Lonsurf and Avastin. Treatment delayed due to recent hospitalization for pancreatitis, likely immune therapy related, improving on steroids. Completed taper. Started Lonsurf on 2/12  -Dental infection has cleared, she is not planning on any procedures. Risks benefits discussed again, she is agreeable.   -ok to proceed with avastin.     Hypokalemia  -K 3.4  - IV K+ 10 meq today    Immunodeficiency due to chemotherapy  -WBC  5.49  -No signs infection  -will monitor     Abdominal pain  -Pepcid 40 mg daily  -Advised pt to take bentyl as prescribed  -bland diet     Visit today included increased complexity associated with the care of the episodic problem chemotherapy addressed and managing the longitudinal care of the patient due to the serious and/or complex managed problem(s) Malignant neoplasm sigmoid colon.        Med Onc Chart Routing      Follow up with physician . Follow up with Dr Pimentel with labs as scheduled   Follow up with [facility]    Infusion scheduling note    Injection scheduling note    Labs    Imaging    Pharmacy appointment    Other referrals                  Plan was discussed with the patient at length, and she verbalized understanding. Gail was given an opportunity to ask questions that were answered to her satisfaction, and she was advised to call in the interval if any problems or questions arise.    Assessment/Plan reviewed and approved by Dr Pimentel     40 minutes were spent in coordination of patient's care, record review and counseling.    OLLIE Ackerman, FNP-C  Hematology & Oncology

## 2024-03-21 PROBLEM — E87.6 HYPOKALEMIA: Status: ACTIVE | Noted: 2024-03-21

## 2024-03-21 NOTE — PROGRESS NOTES
Oncology   Chemotherapy Infusion Visit    Quick Social Service Status Follow Up   Met w/ pt briefly to follow up on social and emotional needs since initiation of treatment.    SW followed up with pt at chairside. Pt is here with her daughter today. SW introduced self to daughter. SW provided pt with a gas card.   Pt reports she is not feeling too well today. SW asked pt if she is still seeing psychology for ongoing counseling. She is seeing Dr Ashley. Pt said she canceled her last appointment with Dr ashley because she was not feeling well. Her next appointment next week. SW encouraged pt to ask for virtual visits when she is not feeling well enough to come the cancer center. This way she could still be seen instead of postponing the appointment. Pt said that is a good idea and did not realize she could do this. She will consider this next time she does not feel well.     Pt denied any further needs at this time.            Radha Giordano, MEDINA  03/21/2024  11:39 AM

## 2024-03-21 NOTE — PLAN OF CARE
Problem: Adult Inpatient Plan of Care  Goal: Plan of Care Review  Outcome: Ongoing, Progressing  Flowsheets (Taken 3/21/2024 1318)  Plan of Care Reviewed With:   patient   daughter  Goal: Patient-Specific Goal (Individualized)  Outcome: Ongoing, Progressing  Flowsheets (Taken 3/21/2024 1318)  Anxieties, Fears or Concerns: severe nausea  Individualized Care Needs: recliner/warm blanket/snacks/daughter at chairside     Problem: Fatigue  Goal: Improved Activity Tolerance  Outcome: Ongoing, Progressing  Intervention: Promote Improved Energy  Flowsheets (Taken 3/21/2024 1318)  Fatigue Management:   activity schedule adjusted   activity assistance provided   fatigue-related activity identified   frequent rest breaks encouraged   paced activity encouraged  Sleep/Rest Enhancement:   noise level reduced   reading promoted   regular sleep/rest pattern promoted   relaxation techniques promoted   room darkened   family presence promoted  Activity Management:   Ambulated -L4   Ambulated to bathroom - L4   Ambulated in reyes - L4   Up in stretcher chair - L1   Pt tolerated MVASI/potassium well today. NAD/no concerns voiced. Reviewed follow-up appointments. All questions were answered, ambulated independently at d/c with daughter.

## 2024-03-21 NOTE — PROGRESS NOTES
Nutrition Note  Pt eligible for a food order through the [facility]. Pt denies need at this time.    Wt Readings from Last 8 Encounters:   03/21/24 120.4 kg (265 lb 6.9 oz)   03/21/24 120.4 kg (265 lb 6.9 oz)   03/11/24 119.7 kg (264 lb)   03/07/24 121.6 kg (268 lb)   03/07/24 121.6 kg (268 lb)   02/19/24 124.6 kg (274 lb 11.1 oz)   02/19/24 124.6 kg (274 lb 11.1 oz)   02/13/24 120.2 kg (265 lb)       Ciera Richardson, DAVIDN, LDN

## 2024-03-25 NOTE — TELEPHONE ENCOUNTER
I really do not know if gastroenterology can help with her pain, I think this is related to the mesenteric edema that they found on the CT scan when she went into the ER  Lets have Fawn see her to address neoplasm related pain    Is she having diarrhea as well?   I think he is better if we see her in the clinic 1st before deciding GI referral

## 2024-03-27 NOTE — PATIENT INSTRUCTIONS
Continue    oxyCODONE-acetaminophen (PERCOCET)  mg per tablet; Take 1 tablet by mouth every 4 (four) hours as needed for Pain.      Continue senna (SENOKOT) 8.6 mg tablet; Take 2 tablets by mouth twice daily.    Start Gabapentin 300 mg every evening      Continue lorazepam 1 mg bid/prn

## 2024-03-27 NOTE — PROGRESS NOTES
Office Visit  [facility]  Reason for consult: cancer related pain     ASSESSMENT/PLAN:     Plan/Recommendations:  Diagnoses and all orders for this visit:    Cancer related pain  Continue    oxyCODONE-acetaminophen (PERCOCET)  mg per tablet; Take 1 tablet by mouth every 4 (four) hours as needed for Pain.- no refills provided today  Continue senna (SENOKOT) 8.6 mg tablet; Take 2 tablets by mouth twice daily.  Start Gabapentin 300 mg every evening     Malignant neoplasm of sigmoid colon  Continue to follow with Oncology.Patient now on Medicaid and can no longer seek out care at [facility] as they do not accept her insurance                     Palliative care by specialist  Depressive disorder  Low mood. Denies SI/HI  Encouraged to f/u with Sammy Pino for medication adjustment- currently on Wellbutrin, addition of Cymbalta did not help    Anxiety   Continue lorazepam 1 mg bid/prn- she uses mostly at night or sleep.      Follow up: 1 month    SUBJECTIVE:     History of Present Illness:  Patient is a 53 y.o. 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 She is followed by Palliative Care for cancer related pain.    Patient presents for routine follow up pain evaluation. At last visit her mood was low as she had stopped her clinical trial as it did not seem to be working.She has not been able to return to [facility] because she is no longer working and they do not accept Medicaid. She is following now with Dr Pimentel . Per chart notes : started next line of SOC treatment with Lonsurf and Avastin. Treatment delayed due to recent hospitalization for pancreatitis, likely immune therapy related, improving on steroids. Completed taper. Started Lonsurf on 2/12     Abdominal pain had been stable - had constipation and diarrhea, she uses probiotic and senna when she is constipated.   Pelvic pan 8/10, no bleeding - percocet still helps - takes  about 1 per day    Last BM today    Following her admission for pancreatitis- she was started on  bentyl and Pepcid.  She was also referred to GI for f/u, patient unaware- contact information provided, encouraged to make an appointment . Patient does have h/o gall stones.     Appetite- fair- she  eats about 3 meals per day without stomach cramps  Sleep- good   Mood - sometimes angry, but denies SI/HI, she also denies feeling hopeless. She is supported by her mother. She is currently on wellbutrin, added cymbalta by Dr Hill - did not find it helpful- she stopped taking / Her Psychiatrists is Sam[facility]- encouraged to follow up          Past Medical History:   Diagnosis Date    Anxiety     Depression     FH: ovarian cancer 2020    Hx of psychiatric care     Effexor, Paxil, Lexapro, Zoloft, Wellbutrin, Trazodone Buspar    Hypertension     Hyperthyroidism     Hypothyroid     Kidney calculi     Malignant neoplasm of sigmoid colon 2020    Menorrhagia     Multinodular goiter 2012    Palpitation     Psychiatric problem     Venous insufficiency     Vulvar lesion      Past Surgical History:   Procedure Laterality Date     SECTION, CLASSIC      x3    COLONOSCOPY N/A 2020    Procedure: COLONOSCOPY;  Surgeon: Shane Parker MD;  Location: [facility];  Service: Endoscopy;  Laterality: N/A;    COLONOSCOPY N/A 2022    Procedure: COLONOSCOPY;  Surgeon: Shane Parker MD;  Location: [facility];  Service: Endoscopy;  Laterality: N/A;    COLONOSCOPY N/A 2023    Procedure: COLONOSCOPY;  Surgeon: Shane Parker MD;  Location: [facility];  Service: Endoscopy;  Laterality: N/A;  no cecum anastomosis    CYSTOSCOPY W/ URETERAL STENT PLACEMENT Bilateral 2020    Procedure: CYSTOSCOPY, WITH URETERAL STENT INSERTION;  Surgeon: Claudio Tyson MD;  Location: [address];  Service: Urology;  Laterality: Bilateral;    EXAMINATION UNDER ANESTHESIA N/A 2024    Procedure: Exam  under anesthesia-vagina;  Surgeon: Eli Her MD;  [facility];  Service: OB/GYN;  Laterality: N/A;    EXCISION-WIDE LOCAL Left 1/16/2024    Procedure: EXCISION-WIDE LOCAL -  Labia Majora;  Surgeon: Eli Her MD;  Location: [facility] OR;  Service: OB/GYN;  Laterality: Left;  upper left side of labia majora    EXCISIONAL BIOPSY, LYMPH NODE  07/2022    abdominal x 4    INSERTION OF TUNNELED CENTRAL VENOUS CATHETER (CVC) WITH SUBCUTANEOUS PORT N/A 05/01/2020    Procedure: [id number];  Surgeon: Stephen Diagnostic Provider;  Location: [facility] OR 2ND FLR;  Service: Radiology;  Laterality: N/A;  Room 189/Cindy    LAPAROSCOPIC SIGMOIDECTOMY N/A 03/25/2020    Procedure: COLECTOMY, SIGMOID, LAPAROSCOPIC, flex sig, ERAS high;  Surgeon: Silvio Man MD;  Location: [facility] OR 2ND FLR;  Service: Colon and Rectal;  Laterality: N/A;    MOBILIZATION OF SPLENIC FLEXURE  03/25/2020    Procedure: MOBILIZATION, SPLENIC FLEXURE;  Surgeon: Silvio Man MD;  Location: [facility] OR 2ND FLR;  Service: Colon and Rectal;;    TONSILLECTOMY      TOTAL ABDOMINAL HYSTERECTOMY W/ BILATERAL SALPINGOOPHORECTOMY N/A 03/25/2020    Procedure: HYSTERECTOMY, TOTAL, ABDOMINAL, WITH BILATERAL SALPINGO-OOPHORECTOMY;  Surgeon: Keron Brady MD;  Location: [facility] OR 2ND FLR;  Service: Oncology;  Laterality: N/A;     Family History   Problem Relation Age of Onset    Heart disease Father     Diabetes Mother 65    Drug abuse Brother     Drug abuse Brother     Cancer Maternal Aunt         lung cancer    Cancer Maternal Grandmother         stomach cancer- started in ovaries    Ovarian cancer Maternal Grandmother         stomach cancer- started in ovaries    Colon cancer Paternal Uncle     Cancer Paternal Uncle     Ovarian cancer Maternal Aunt     Ovarian cancer Maternal Aunt     Ovarian cancer Cousin         mother had ovarian cancer    Drug abuse Son     Drug abuse Son         clean/sober since 2012    Colon cancer Son     No Known  Problems Daughter     Uterine cancer Neg Hx     Breast cancer Neg Hx      Review of patient's allergies indicates:   Allergen Reactions    Oxaliplatin Other (See Comments)     Itchy hands, throat closed up, trouble hearing - during infusion    Penicillins Hives and Rash     childhood allergy       Social Determinants of Health     Tobacco Use: Low Risk  (3/27/2024)    Patient History     Smoking Tobacco Use: Never     Smokeless Tobacco Use: Never     Passive Exposure: Not on file   Alcohol Use: Not At Risk (2/19/2024)    AUDIT-C     Frequency of Alcohol Consumption: Never     Average Number of Drinks: Patient does not drink     Frequency of Binge Drinking: Less than monthly   Financial Resource Strain: High Risk (2/19/2024)    Overall Financial Resource Strain (CARDIA)     Difficulty of Paying Living Expenses: Very hard   Food Insecurity: No Food Insecurity (2/19/2024)    Hunger Vital Sign     Worried About Running Out of Food in the Last Year: Never true     Ran Out of Food in the Last Year: Never true   Transportation Needs: No Transportation Needs (2/19/2024)    PRAPARE - Transportation     Lack of Transportation (Medical): No     Lack of Transportation (Non-Medical): No   Physical Activity: Insufficiently Active (2/19/2024)    Exercise Vital Sign     Days of Exercise per Week: 1 day     Minutes of Exercise per Session: 20 min   Stress: Stress Concern Present (2/19/2024)    [employer] - Occupational Stress Questionnaire     Feeling of Stress : To some extent   Social Connections: Unknown (2/19/2024)    Social Connection and Isolation Panel [NHANES]     Frequency of Communication with Friends and Family: More than three times a week     Frequency of Social Gatherings with Friends and Family: Twice a week     Attends [employer] Services: Not on file     Active Member of Clubs or Organizations: No     Attends Club or Organization Meetings: Never     Marital Status:    Housing  Stability: High Risk (2/19/2024)    Housing Stability Vital Sign     Unable to Pay for Housing in the Last Year: Yes     Number of Places Lived in the Last Year: 1     Unstable Housing in the Last Year: No   Depression: Medium Risk (3/27/2024)    Depression     Last PHQ-4: Flowsheet Data: 6      The Modified Caregiver Strain Index (MCSI) -   No data recorded   No data recorded   No data recorded   No data recorded   No data recorded   No data recorded   No data recorded   No data recorded   No data recorded   No data recorded   No data recorded   No data recorded   No data recorded   No data recorded       Medications:    Current Outpatient Medications:     acetaminophen (TYLENOL) 500 MG tablet, Take 2 tablets (1,000 mg total) by mouth every 8 (eight) hours., Disp: 60 tablet, Rfl: 0    buPROPion (WELLBUTRIN SR) 150 MG TBSR 12 hr tablet, Take 1 tablet (150 mg total) by mouth 2 (two) times daily., Disp: 180 tablet, Rfl: 0    cinacalcet (SENSIPAR) 30 MG Tab, Take 1 tablet (30 mg total) by mouth daily with breakfast., Disp: 30 tablet, Rfl: 11    dicyclomine (BENTYL) 20 mg tablet, Take 20 mg by mouth 2 (two) times daily., Disp: , Rfl:     famotidine (PEPCID) 40 MG tablet, Take 1 tablet (40 mg total) by mouth once daily., Disp: 30 tablet, Rfl: 11    Lactobacillus rhamnosus GG (CULTURELLE) 10 billion cell capsule, Take 1 capsule by mouth once daily., Disp: , Rfl:     lactulose (CHRONULAC) 10 gram/15 mL solution, Take 15 mLs (10 g total) by mouth every 6 (six) hours as needed (constipation)., Disp: 150 mL, Rfl: 1    levothyroxine (SYNTHROID) 175 MCG tablet, Take 2 tablets (350 mcg total) by mouth before breakfast., Disp: 60 tablet, Rfl: 11    LIDOcaine-prilocaine (EMLA) cream, Apply topically as needed (30 to 60 min prior chemo or port use)., Disp: 30 g, Rfl: 3    LORazepam (ATIVAN) 1 MG tablet, Take 1 tablet (1 mg total) by mouth every 12 (twelve) hours as needed for Anxiety (nausea)., Disp: 30 tablet, Rfl: 0    magic  mouthwash diphen/antac/lidoc/nysta, Swish and swallow 10 ml by mouth 4 times daily, Disp: 500 mL, Rfl: 6    multivitamin (THERAGRAN) per tablet, Take 1 tablet by mouth once daily., Disp: , Rfl:     olmesartan (BENICAR) 20 MG tablet, Take 1 tablet (20 mg total) by mouth once daily., Disp: 30 tablet, Rfl: 5    ondansetron (ZOFRAN-ODT) 4 MG TbDL, Take 1 tablet (4 mg total) by mouth every 8 (eight) hours as needed (Nausea)., Disp: 20 tablet, Rfl: 0    ondansetron (ZOFRAN-ODT) 8 MG TbDL, Take 1 tablet (8 mg total) by mouth every 8 (eight) hours as needed., Disp: 60 tablet, Rfl: 3    oxyCODONE (ROXICODONE) 5 MG immediate release tablet, Take 1 tablet (5 mg total) by mouth every 4 (four) hours as needed for Pain (Abdominal pain)., Disp: 8 tablet, Rfl: 0    prochlorperazine (COMPAZINE) 10 MG tablet, Take 1 tablet (10 mg total) by mouth every 6 (six) hours as needed., Disp: 20 tablet, Rfl: 0    senna (SENOKOT) 8.6 mg tablet, Take 2 tablets by mouth once daily., Disp: 60 tablet, Rfl: 2    trifluridine-tipiraciL (LONSURF) 20-8.19 mg Tab, Take 35 mg/m2 by mouth 2 (two) times daily **NEW RX NOT STARTED YET**., Disp: , Rfl:     trifluridine-tipiraciL (LONSURF) 20-8.19 mg Tab, Take 4 tablets by mouth 2 times daily on days 1 to 5 and days 8 to 12 of a 28-day cycle., Disp: 80 tablet, Rfl: 3    DULoxetine (CYMBALTA) 30 MG capsule, Take 2 capsules (60 mg total) by mouth once daily., Disp: 60 capsule, Rfl: 0    levoFLOXacin (LEVAQUIN) 750 MG tablet, Take 1 tablet (750 mg total) by mouth once daily. **NEW RX NOT STARTED YET** (Patient not taking: Reported on 3/27/2024), Disp: , Rfl:     metroNIDAZOLE (FLAGYL) 500 MG tablet, Take 1 tablet (500 mg total) by mouth every 8 (eight) hours. **NEW RX NOT STARTED YET** (Patient not taking: Reported on 3/27/2024), Disp: , Rfl:     mirtazapine (REMERON) 7.5 MG Tab, Take 1 tablet (7.5 mg total) by mouth every evening. (Patient taking differently: Take 7.5 mg by mouth nightly as needed.), Disp: 30  tablet, Rfl: 0  No current facility-administered medications for this visit.    Facility-Administered Medications Ordered in Other Visits:     ondansetron injection 4 mg, 4 mg, Intravenous, Daily PRN, Volpi-Abadie, Jacqueline, MD    sodium chloride 0.9% flush 3 mL, 3 mL, Intravenous, PRN, Volpi-Abadie, Jacqueline, MD    OBJECTIVE:       ROS:  Review of Systems   Constitutional:  Negative for appetite change and unexpected weight change.   HENT:  Negative for mouth sores.    Eyes:  Negative for visual disturbance.   Respiratory:  Negative for cough and shortness of breath.    Cardiovascular:  Negative for chest pain.   Gastrointestinal:  Positive for abdominal pain, constipation and diarrhea. Negative for nausea.   Genitourinary:  Negative for frequency.   Musculoskeletal:  Positive for back pain.   Skin:  Negative for rash.   Neurological:  Negative for headaches.   Hematological:  Negative for adenopathy.   Psychiatric/Behavioral:  The patient is nervous/anxious.        Review of Symptoms      Symptom Assessment (ESAS 0-10 Scale)  Pain:  0  Dyspnea:  0  Anxiety:  0  Nausea:  0  Depression:  5  Anorexia:  0  Fatigue:  0  Insomnia:  0  Restlessness:  0  Agitation:  0       Anxiety:  Is nervous/anxious  Constipation:  Constipation    Performance Status:  80    ECOG Performance Status stGstrstastdstest:st st1st Living Arrangements:  Lives with family and Lives in home    Psychosocial/Cultural:   See Palliative Psychosocial Note: Yes  **Primary  to Follow**  Palliative Care  Consult: No      Advance Care Planning   Advance Directives:   Living Will: No    LaPOST: No    Do Not Resuscitate Status: No      Decision Making:  Patient answered questions  Goals of Care: What is most important right now is to focus on symptom/pain control. Accordingly, we have decided that the best plan to meet the patient's goals includes continuing with treatment.              Physical Exam:  Vitals: Temp: 97.2 °F (36.2 °C)  (03/27/24 1110)  Pulse: 92 (03/27/24 1110)  Resp: 16 (03/27/24 1110)  BP: (!) 152/90 (03/27/24 1110)  SpO2: 97 % (03/27/24 1110)  Physical Exam  Vitals reviewed.   Constitutional:       Appearance: She is not ill-appearing.   HENT:      Head: Normocephalic.      Mouth/Throat:      Mouth: Mucous membranes are moist.   Pulmonary:      Effort: Pulmonary effort is normal.   Abdominal:      General: There is no distension.      Palpations: Abdomen is soft.      Tenderness: There is no abdominal tenderness.      Comments: Obese abdomen   Musculoskeletal:         General: Normal range of motion.      Cervical back: Normal range of motion.   Skin:     General: Skin is warm and dry.   Neurological:      Mental Status: She is alert and oriented to person, place, and time.   Psychiatric:         Mood and Affect: Mood is depressed.         Behavior: Behavior normal.         Labs:  CBC:   WBC   Date Value Ref Range Status   03/21/2024 5.49 3.90 - 12.70 K/uL Final     Hemoglobin   Date Value Ref Range Status   03/21/2024 13.3 12.0 - 16.0 g/dL Final     Hematocrit   Date Value Ref Range Status   03/21/2024 41.1 37.0 - 48.5 % Final     MCV   Date Value Ref Range Status   03/21/2024 80 (L) 82 - 98 fL Final     Platelets   Date Value Ref Range Status   03/21/2024 276 150 - 450 K/uL Final       LFT:   Lab Results   Component Value Date    AST 18 03/20/2024    ALKPHOS 149 (H) 03/20/2024    BILITOT 0.5 03/20/2024       Albumin:   Albumin   Date Value Ref Range Status   03/20/2024 3.3 (L) 3.5 - 5.2 g/dL Final     Protein:   Total Protein   Date Value Ref Range Status   03/20/2024 6.9 6.0 - 8.4 g/dL Final       30 minutes of total time spent on the encounter, which includes face to face time and non-face to face time preparing to see the patient (eg, review of tests), Obtaining and/or reviewing separately obtained history, Documenting clinical information in the electronic or other health record, Independently interpreting results (not  separately reported) and communicating results to the patient/family/caregiver, or Care coordination (not separately reported).       Signature: Fawn Snyder NP

## 2024-03-28 NOTE — PROGRESS NOTES
PSYCHO-ONCOLOGY NOTE/ Individual Psychotherapy     Date: 3/28/2024   Site:  [facility]      Therapeutic Intervention: Met with patient.  Outpatient - Supportive psychotherapy 30 min - CPT Code 27158    This includes face to face time and non-face to face time preparing to see the patient, obtaining and/or reviewing separately obtained history, documenting clinical information in the electronic or other health record, independently interpreting results and communicating results to the patient/family/caregiver, or care coordinator.    The patient location is: [facility]  The chief complaint leading to consultation is: follow up therapy     Visit type: audiovisual    Face to Face time with patient: 33 minutes  40 minutes of total time spent on the encounter, which includes face to face time and non-face to face time preparing to see the patient (eg, review of tests), Obtaining and/or reviewing separately obtained history, Documenting clinical information in the electronic or other health record, Independently interpreting results (not separately reported) and communicating results to the patient/family/caregiver, or Care coordination (not separately reported).         Each patient to whom he or she provides medical services by telemedicine is:  (1) informed of the relationship between the physician and patient and the respective role of any other health care provider with respect to management of the patient; and (2) notified that he or she may decline to receive medical services by telemedicine and may withdraw from such care at any time.      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    Anxiety    Insomnia    Insomnia    Other constipation    Nausea and vomiting    Mucositis due to chemotherapy    Morbid obesity    Secondary adenocarcinoma of lymph node    Thyroid nodule    Hypercalcemia    Transaminitis    Hypophosphatemia    Functional diarrhea    Primary hypertension    Diarrhea of presumed infectious origin    Calculus of gallbladder without cholecystitis without obstruction    ACP (advance care planning)    Abdominal pain    Shortness of breath    Secondary malignant neoplasm of retroperitoneum    Depressive disorder    Hypothyroidism    Immunodeficiency due to chemotherapy    Acneiform rash    Chronic midline low back pain without sciatica    Chemotherapy-induced fatigue    Vulval lesion    Iron deficiency anemia due to chronic blood loss    Acute pancreatitis    Anxiety and depression    Hypoglycemia    Secondary liver cancer    Mesenteric adenitis    Hypokalemia       Chief complaint/reason for encounter: depression   Met with patient to evaluate psychosocial adaptation to diagnosis/treatment/survivorship of metastatic colon cancer    Current Medications  Current Outpatient Medications   Medication    acetaminophen (TYLENOL) 500 MG tablet    buPROPion (WELLBUTRIN SR) 150 MG TBSR 12 hr tablet    cinacalcet (SENSIPAR) 30 MG Tab    dicyclomine (BENTYL) 20 mg tablet    DULoxetine (CYMBALTA) 30 MG capsule    famotidine (PEPCID) 40 MG tablet    gabapentin (NEURONTIN) 300 MG capsule    Lactobacillus rhamnosus GG (CULTURELLE) 10 billion cell capsule    lactulose (CHRONULAC) 10 gram/15 mL solution    levoFLOXacin (LEVAQUIN) 750 MG tablet    levothyroxine (SYNTHROID) 175 MCG tablet    LIDOcaine-prilocaine (EMLA) cream    LORazepam (ATIVAN) 1 MG tablet    magic mouthwash diphen/antac/lidoc/nysta    metroNIDAZOLE (FLAGYL) 500 MG tablet    mirtazapine (REMERON) 7.5 MG Tab    multivitamin (THERAGRAN) per tablet    olmesartan (BENICAR) 20 MG tablet    ondansetron (ZOFRAN-ODT) 4 MG TbDL     ondansetron (ZOFRAN-ODT) 8 MG TbDL    oxyCODONE (ROXICODONE) 5 MG immediate release tablet    prochlorperazine (COMPAZINE) 10 MG tablet    senna (SENOKOT) 8.6 mg tablet    trifluridine-tipiraciL (LONSURF) 20-8.19 mg Tab    trifluridine-tipiraciL (LONSURF) 20-8.19 mg Tab     No current facility-administered medications for this visit.     Facility-Administered Medications Ordered in Other Visits   Medication Frequency    ondansetron injection 4 mg Daily PRN    sodium chloride 0.9% flush 3 mL PRN       ONCOLOGY HISTORY  Oncology History   Malignant neoplasm of sigmoid colon   3/16/2020 Initial Diagnosis    Malignant neoplasm of sigmoid colon     3/31/2020 Cancer Staged    Staging form: Colon and Rectum, AJCC 8th Edition  - Clinical stage from 3/31/2020: Stage IIIC (cT4b, cN2a, cM0)     5/6/2020 - 11/17/2020 Chemotherapy    Treatment Summary   Plan Name: OP FOLFOX 6 Q2W  Treatment Goal: Curative  Status: Inactive  Start Date: 5/6/2020  End Date: 11/6/2020  Provider: Dylan Leyva MD  Chemotherapy: fluorouraciL injection 945 mg, 400 mg/m2 = 945 mg, Intravenous, Clinic/[facility] 1 time, 14 of 14 cycles  Administration: 945 mg (5/6/2020), 945 mg (5/20/2020), 945 mg (6/3/2020), 945 mg (6/17/2020), 945 mg (7/29/2020), 945 mg (8/12/2020), 945 mg (8/26/2020), 945 mg (9/9/2020), 945 mg (9/23/2020), 945 mg (10/6/2020), 945 mg (10/21/2020), 945 mg (11/4/2020)  fluorouraciL 2,400 mg/m2 = 5,665 mg in sodium chloride 0.9% 240 mL chemo infusion, 2,400 mg/m2 = 5,665 mg, Intravenous, Over 46 hours, 14 of 14 cycles  Administration: 5,665 mg (5/6/2020), 5,665 mg (5/20/2020), 5,665 mg (6/3/2020), 5,665 mg (6/17/2020), 5,665 mg (7/29/2020), 5,665 mg (8/12/2020), 5,665 mg (8/26/2020), 5,665 mg (9/9/2020), 5,665 mg (9/23/2020), 5,665 mg (10/6/2020), 5,665 mg (10/21/2020), 5,665 mg (11/4/2020)  leucovorin calcium 900 mg in dextrose 5 % 250 mL infusion, 945 mg, Intravenous, [facility] 1 time, 14 of 14 cycles  Administration: 900 mg (5/6/2020),  900 mg (5/20/2020), 900 mg (6/3/2020), 900 mg (6/17/2020), 945 mg (6/30/2020), 945 mg (7/20/2020), 945 mg (7/29/2020), 945 mg (8/12/2020), 945 mg (8/26/2020), 945 mg (9/9/2020), 945 mg (9/23/2020), 945 mg (10/6/2020), 945 mg (10/21/2020), 945 mg (11/4/2020)  oxaliplatin (ELOXATIN) 200 mg in dextrose 5 % 500 mL chemo infusion, 201 mg, Intravenous, Clinic/[facility] 1 time, 6 of 6 cycles  Dose modification: 65 mg/m2 (original dose 85 mg/m2, Cycle 6)  Administration: 200 mg (5/6/2020), 200 mg (5/20/2020), 200 mg (6/3/2020), 200 mg (6/17/2020), 201 mg (6/30/2020), 150 mg (7/20/2020)     5/3/2021 Cancer Staged    Staging form: Colon and Rectum, AJCC 8th Edition  - Clinical stage from 5/3/2021: Stage Unknown (rcT0, cNX, cM1)     6/16/2021 - 8/2/2023 Chemotherapy    Treatment Summary   Plan Name: OP COLORECTAL FOLFIRI + BEVACIZUMAB Q2W  Treatment Goal: Control  Status: Inactive  Start Date: 6/16/2021  End Date: 8/2/2023  Provider: Marah Santo MD  Chemotherapy: fluorouraciL injection 990 mg, 400 mg/m2 = 990 mg, Intravenous, [facility] 1 time, 15 of 15 cycles  Administration: 990 mg (6/16/2021), 990 mg (6/30/2021), 990 mg (7/14/2021), 980 mg (7/28/2021), 990 mg (8/11/2021), 990 mg (8/25/2021), 990 mg (9/8/2021), 990 mg (9/22/2021), 990 mg (10/6/2021), 990 mg (10/20/2021), 990 mg (11/3/2021), 990 mg (12/1/2021), 990 mg (12/15/2021), 990 mg (11/17/2021), 990 mg (12/28/2021)  fluorouraciL 2,400 mg/m2 = 5,950 mg in sodium chloride 0.9% 240 mL chemo infusion, 2,400 mg/m2 = 5,950 mg, Intravenous, Over 46 hours, 43 of 46 cycles  Administration: 5,950 mg (6/16/2021), 5,950 mg (6/30/2021), 5,950 mg (7/14/2021), 5,880 mg (7/28/2021), 5,930 mg (8/11/2021), 5,930 mg (8/25/2021), 5,930 mg (9/8/2021), 5,930 mg (9/22/2021), 5,930 mg (10/6/2021), 5,930 mg (10/20/2021), 5,930 mg (11/3/2021), 5,930 mg (12/1/2021), 5,930 mg (12/15/2021), 5,930 mg (11/17/2021), 5,930 mg (12/28/2021), 6,050 mg (5/11/2022), 6,025 mg (3/9/2022), 6,025 mg  (2/23/2022), 5,930 mg (2/2/2022), 5,930 mg (1/19/2022), 6,050 mg (4/20/2022), 6,025 mg (4/6/2022), 6,025 mg (3/23/2022), 6,050 mg (6/1/2022), 6,050 mg (6/15/2022), 6,050 mg (10/19/2022), 6,050 mg (10/5/2022), 6,050 mg (11/2/2022), 6,050 mg (11/16/2022), 6,050 mg (11/30/2022), 6,050 mg (1/4/2023), 6,050 mg (12/19/2022), 6,050 mg (1/18/2023), 6,000 mg (5/22/2023), 6,000 mg (4/26/2023), 6,000 mg (4/11/2023), 6,000 mg (2/28/2023), 6,050 mg (2/14/2023), 6,000 mg (2/1/2023), 6,000 mg (7/5/2023), 6,000 mg (6/19/2023), 6,000 mg (6/5/2023), 6,000 mg (7/31/2023)  bevacizumab (AVASTIN) 600 mg in sodium chloride 0.9% 100 mL chemo infusion, 660 mg, Intravenous, [facility] 1 time, 36 of 36 cycles  Dose modification: 5 mg/kg (original dose 5 mg/kg, Cycle 26, Reason: MD Discretion, Comment: 5 mg/kg original dose)  Administration: 600 mg (6/30/2021), 600 mg (7/14/2021), 600 mg (7/28/2021), 600 mg (6/16/2021), 600 mg (8/11/2021), 655 mg (8/25/2021), 600 mg (9/8/2021), 600 mg (9/22/2021), 600 mg (10/6/2021), 600 mg (10/20/2021), 600 mg (11/3/2021), 655 mg (12/1/2021), 600 mg (12/15/2021), 600 mg (11/17/2021), 600 mg (12/28/2021), 600 mg (5/11/2022), 600 mg (3/9/2022), 600 mg (2/23/2022), 600 mg (2/2/2022), 600 mg (1/19/2022), 600 mg (4/20/2022), 680 mg (4/6/2022), 600 mg (3/23/2022), 680 mg (10/5/2022), 680 mg (10/19/2022), 680 mg (11/2/2022), 680 mg (11/16/2022), 680 mg (11/30/2022), 680 mg (1/4/2023), 680 mg (12/19/2022), 680 mg (1/18/2023), 680 mg (3/28/2023), 680 mg (3/14/2023), 680 mg (2/28/2023), 680 mg (2/14/2023), 680 mg (2/1/2023)  irinotecan (CAMPTOSAR) 440 mg in sodium chloride 0.9% 500 mL chemo infusion, 446 mg, Intravenous, Clinic/[facility] 1 time, 45 of 48 cycles  Dose modification: 180 mg/m2 (original dose 180 mg/m2, Cycle 24)  Administration: 440 mg (6/16/2021), 440 mg (6/30/2021), 440 mg (7/14/2021), 440 mg (7/28/2021), 440 mg (8/11/2021), 444 mg (8/25/2021), 440 mg (9/8/2021), 440 mg (9/22/2021), 440 mg (10/6/2021), 440 mg  (10/20/2021), 440 mg (11/3/2021), 440 mg (12/1/2021), 440 mg (12/15/2021), 440 mg (11/17/2021), 440 mg (12/28/2021), 440 mg (5/11/2022), 440 mg (3/9/2022), 440 mg (2/23/2022), 440 mg (2/2/2022), 440 mg (1/19/2022), 440 mg (4/20/2022), 440 mg (4/6/2022), 440 mg (3/24/2022), 440 mg (6/1/2022), 440 mg (6/15/2022), 440 mg (10/19/2022), 440 mg (10/5/2022), 440 mg (11/2/2022), 440 mg (11/16/2022), 440 mg (11/30/2022), 440 mg (1/4/2023), 440 mg (12/19/2022), 440 mg (1/18/2023), 440 mg (5/22/2023), 440 mg (4/26/2023), 440 mg (4/11/2023), 440 mg (3/28/2023), 440 mg (3/14/2023), 440 mg (2/28/2023), 440 mg (2/14/2023), 440 mg (2/1/2023), 440 mg (7/5/2023), 440 mg (6/19/2023), 440 mg (6/5/2023), 440 mg (7/31/2023)  bevacizumab-awwb (MVASI) 5 mg/kg = 680 mg in sodium chloride 0.9% 100 mL infusion, 5 mg/kg = 680 mg (100 % of original dose 5 mg/kg), Intravenous, Clinic[facility] 1 time, 7 of 10 cycles  Dose modification: 5 mg/kg (original dose 5 mg/kg, Cycle 39)  Administration: 680 mg (4/11/2023), 680 mg (4/26/2023), 680 mg (5/22/2023), 680 mg (7/5/2023), 680 mg (6/19/2023), 680 mg (6/5/2023), 680 mg (7/31/2023)     8/17/2023 - 8/18/2023 Chemotherapy    Treatment Summary   Plan Name: OP CETUXIMAB 500MG Q2W  Treatment Goal: Control  Status: Inactive  Start Date:   End Date:   Provider: Marah Santo MD  Chemotherapy: [No matching medication found in this treatment plan]     8/24/2023 - 12/29/2023 Chemotherapy    Treatment Summary   Plan Name: [facility] - ARM 1 ENCORAFENIB  CETUXIMAB NIVOLUMAB  Treatment Goal: Palliative  Status: Inactive  Start Date: 8/24/2023  End Date: 12/29/2023  Provider: Marah Santo MD  Chemotherapy: cetuximab (ERBITUX) 100 mg/50 mL chemo infusion 1,200 mg, 1,230 mg, Intravenous, [facility] 1 time, 5 of 12 cycles  Administration: 1,200 mg (8/24/2023), 1,200 mg (9/8/2023), 1,200 mg (9/22/2023), 1,200 mg (10/6/2023), 1,200 mg (10/20/2023), 1,200 mg (11/3/2023), 1,200 mg (11/17/2023), 1,200 mg (12/1/2023),  1,200 mg (12/15/2023), 1,200 mg (12/29/2023)  encorafenib 75 mg Cap, 300 mg, Oral, Daily, 1 of 1 cycle, Start date: --, End date: --     2/19/2024 -  Chemotherapy    Treatment Summary   Plan Name: OP BEVACIZUMAB Q4W  Treatment Goal: Control  Status: Active  Start Date: 2/19/2024  End Date: 1/9/2025 (Planned)  Provider: Marah Santo MD  Chemotherapy: bevacizumab-awwb (MVASI) 600 mg in sodium chloride 0.9% 100 mL infusion, 625 mg (100 % of original dose 5 mg/kg), Intravenous, [facility] 1 time, 2 of 12 cycles  Dose modification: 5 mg/kg (original dose 5 mg/kg, Cycle 1, Reason: Other (see comments), Comment: Given with Lonsurf)  Administration: 600 mg (2/19/2024), 600 mg (3/7/2024), 600 mg (3/21/2024)     Metastasis to intestinal lymph node   4/3/2020 Initial Diagnosis    Metastasis to intestinal lymph node     5/6/2020 - 11/17/2020 Chemotherapy    Treatment Summary   Plan Name: OP FOLFOX 6 Q2W  Treatment Goal: Curative  Status: Inactive  Start Date: 5/6/2020  End Date: 11/6/2020  Provider: Dylan Leyva MD  Chemotherapy: fluorouraciL injection 945 mg, 400 mg/m2 = 945 mg, Intravenous, [facility] 1 time, 14 of 14 cycles  Administration: 945 mg (5/6/2020), 945 mg (5/20/2020), 945 mg (6/3/2020), 945 mg (6/17/2020), 945 mg (7/29/2020), 945 mg (8/12/2020), 945 mg (8/26/2020), 945 mg (9/9/2020), 945 mg (9/23/2020), 945 mg (10/6/2020), 945 mg (10/21/2020), 945 mg (11/4/2020)  fluorouraciL 2,400 mg/m2 = 5,665 mg in sodium chloride 0.9% 240 mL chemo infusion, 2,400 mg/m2 = 5,665 mg, Intravenous, Over 46 hours, 14 of 14 cycles  Administration: 5,665 mg (5/6/2020), 5,665 mg (5/20/2020), 5,665 mg (6/3/2020), 5,665 mg (6/17/2020), 5,665 mg (7/29/2020), 5,665 mg (8/12/2020), 5,665 mg (8/26/2020), 5,665 mg (9/9/2020), 5,665 mg (9/23/2020), 5,665 mg (10/6/2020), 5,665 mg (10/21/2020), 5,665 mg (11/4/2020)  leucovorin calcium 900 mg in dextrose 5 % 250 mL infusion, 945 mg, Intravenous, [facility] [place] 1 time, 14 of 14  cycles  Administration: 900 mg (5/6/2020), 900 mg (5/20/2020), 900 mg (6/3/2020), 900 mg (6/17/2020), 945 mg (6/30/2020), 945 mg (7/20/2020), 945 mg (7/29/2020), 945 mg (8/12/2020), 945 mg (8/26/2020), 945 mg (9/9/2020), 945 mg (9/23/2020), 945 mg (10/6/2020), 945 mg (10/21/2020), 945 mg (11/4/2020)  oxaliplatin (ELOXATIN) 200 mg in dextrose 5 % 500 mL chemo infusion, 201 mg, Intravenous, Clinic/[facility] 1 time, 6 of 6 cycles  Dose modification: 65 mg/m2 (original dose 85 mg/m2, Cycle 6)  Administration: 200 mg (5/6/2020), 200 mg (5/20/2020), 200 mg (6/3/2020), 200 mg (6/17/2020), 201 mg (6/30/2020), 150 mg (7/20/2020)     6/16/2021 - 8/2/2023 Chemotherapy    Treatment Summary   Plan Name: OP COLORECTAL FOLFIRI + BEVACIZUMAB Q2W  Treatment Goal: Control  Status: Inactive  Start Date: 6/16/2021  End Date: 8/2/2023  Provider: Marah Santo MD  Chemotherapy: fluorouraciL injection 990 mg, 400 mg/m2 = 990 mg, Intravenous, Clinic/[facility] 1 time, 15 of 15 cycles  Administration: 990 mg (6/16/2021), 990 mg (6/30/2021), 990 mg (7/14/2021), 980 mg (7/28/2021), 990 mg (8/11/2021), 990 mg (8/25/2021), 990 mg (9/8/2021), 990 mg (9/22/2021), 990 mg (10/6/2021), 990 mg (10/20/2021), 990 mg (11/3/2021), 990 mg (12/1/2021), 990 mg (12/15/2021), 990 mg (11/17/2021), 990 mg (12/28/2021)  fluorouraciL 2,400 mg/m2 = 5,950 mg in sodium chloride 0.9% 240 mL chemo infusion, 2,400 mg/m2 = 5,950 mg, Intravenous, Over 46 hours, 43 of 46 cycles  Administration: 5,950 mg (6/16/2021), 5,950 mg (6/30/2021), 5,950 mg (7/14/2021), 5,880 mg (7/28/2021), 5,930 mg (8/11/2021), 5,930 mg (8/25/2021), 5,930 mg (9/8/2021), 5,930 mg (9/22/2021), 5,930 mg (10/6/2021), 5,930 mg (10/20/2021), 5,930 mg (11/3/2021), 5,930 mg (12/1/2021), 5,930 mg (12/15/2021), 5,930 mg (11/17/2021), 5,930 mg (12/28/2021), 6,050 mg (5/11/2022), 6,025 mg (3/9/2022), 6,025 mg (2/23/2022), 5,930 mg (2/2/2022), 5,930 mg (1/19/2022), 6,050 mg (4/20/2022), 6,025 mg (4/6/2022), 6,025 mg  (3/23/2022), 6,050 mg (6/1/2022), 6,050 mg (6/15/2022), 6,050 mg (10/19/2022), 6,050 mg (10/5/2022), 6,050 mg (11/2/2022), 6,050 mg (11/16/2022), 6,050 mg (11/30/2022), 6,050 mg (1/4/2023), 6,050 mg (12/19/2022), 6,050 mg (1/18/2023), 6,000 mg (5/22/2023), 6,000 mg (4/26/2023), 6,000 mg (4/11/2023), 6,000 mg (2/28/2023), 6,050 mg (2/14/2023), 6,000 mg (2/1/2023), 6,000 mg (7/5/2023), 6,000 mg (6/19/2023), 6,000 mg (6/5/2023), 6,000 mg (7/31/2023)  bevacizumab (AVASTIN) 600 mg in sodium chloride 0.9% 100 mL chemo infusion, 660 mg, Intravenous, [facility] 1 time, 36 of 36 cycles  Dose modification: 5 mg/kg (original dose 5 mg/kg, Cycle 26, Reason: MD Discretion, Comment: 5 mg/kg original dose)  Administration: 600 mg (6/30/2021), 600 mg (7/14/2021), 600 mg (7/28/2021), 600 mg (6/16/2021), 600 mg (8/11/2021), 655 mg (8/25/2021), 600 mg (9/8/2021), 600 mg (9/22/2021), 600 mg (10/6/2021), 600 mg (10/20/2021), 600 mg (11/3/2021), 655 mg (12/1/2021), 600 mg (12/15/2021), 600 mg (11/17/2021), 600 mg (12/28/2021), 600 mg (5/11/2022), 600 mg (3/9/2022), 600 mg (2/23/2022), 600 mg (2/2/2022), 600 mg (1/19/2022), 600 mg (4/20/2022), 680 mg (4/6/2022), 600 mg (3/23/2022), 680 mg (10/5/2022), 680 mg (10/19/2022), 680 mg (11/2/2022), 680 mg (11/16/2022), 680 mg (11/30/2022), 680 mg (1/4/2023), 680 mg (12/19/2022), 680 mg (1/18/2023), 680 mg (3/28/2023), 680 mg (3/14/2023), 680 mg (2/28/2023), 680 mg (2/14/2023), 680 mg (2/1/2023)  irinotecan (CAMPTOSAR) 440 mg in sodium chloride 0.9% 500 mL chemo infusion, 446 mg, Intravenous, Clinic/[facility] 1 time, 45 of 48 cycles  Dose modification: 180 mg/m2 (original dose 180 mg/m2, Cycle 24)  Administration: 440 mg (6/16/2021), 440 mg (6/30/2021), 440 mg (7/14/2021), 440 mg (7/28/2021), 440 mg (8/11/2021), 444 mg (8/25/2021), 440 mg (9/8/2021), 440 mg (9/22/2021), 440 mg (10/6/2021), 440 mg (10/20/2021), 440 mg (11/3/2021), 440 mg (12/1/2021), 440 mg (12/15/2021), 440 mg (11/17/2021), 440 mg  (12/28/2021), 440 mg (5/11/2022), 440 mg (3/9/2022), 440 mg (2/23/2022), 440 mg (2/2/2022), 440 mg (1/19/2022), 440 mg (4/20/2022), 440 mg (4/6/2022), 440 mg (3/24/2022), 440 mg (6/1/2022), 440 mg (6/15/2022), 440 mg (10/19/2022), 440 mg (10/5/2022), 440 mg (11/2/2022), 440 mg (11/16/2022), 440 mg (11/30/2022), 440 mg (1/4/2023), 440 mg (12/19/2022), 440 mg (1/18/2023), 440 mg (5/22/2023), 440 mg (4/26/2023), 440 mg (4/11/2023), 440 mg (3/28/2023), 440 mg (3/14/2023), 440 mg (2/28/2023), 440 mg (2/14/2023), 440 mg (2/1/2023), 440 mg (7/5/2023), 440 mg (6/19/2023), 440 mg (6/5/2023), 440 mg (7/31/2023)  bevacizumab-awwb (MVASI) 5 mg/kg = 680 mg in sodium chloride 0.9% 100 mL infusion, 5 mg/kg = 680 mg (100 % of original dose 5 mg/kg), Intravenous, Clinic/[facility] 1 time, 7 of 10 cycles  Dose modification: 5 mg/kg (original dose 5 mg/kg, Cycle 39)  Administration: 680 mg (4/11/2023), 680 mg (4/26/2023), 680 mg (5/22/2023), 680 mg (7/5/2023), 680 mg (6/19/2023), 680 mg (6/5/2023), 680 mg (7/31/2023)     8/17/2023 - 8/18/2023 Chemotherapy    Treatment Summary   Plan Name: OP CETUXIMAB 500MG Q2W  Treatment Goal: Control  Status: Inactive  Start Date:   End Date:   Provider: Marah Santo MD  Chemotherapy: [No matching medication found in this treatment plan]     8/24/2023 - 12/29/2023 Chemotherapy    Treatment Summary   Plan Name: [facility] - ARM 1 ENCORAFENIB  CETUXIMAB NIVOLUMAB  Treatment Goal: Palliative  Status: Inactive  Start Date: 8/24/2023  End Date: 12/29/2023  Provider: Marah Santo MD  Chemotherapy: cetuximab (ERBITUX) 100 mg/50 mL chemo infusion 1,200 mg, 1,230 mg, Intravenous, Clinic/[facility] 1 time, 5 of 12 cycles  Administration: 1,200 mg (8/24/2023), 1,200 mg (9/8/2023), 1,200 mg (9/22/2023), 1,200 mg (10/6/2023), 1,200 mg (10/20/2023), 1,200 mg (11/3/2023), 1,200 mg (11/17/2023), 1,200 mg (12/1/2023), 1,200 mg (12/15/2023), 1,200 mg (12/29/2023)  encorafenib 75 mg Cap, 300 mg, Oral, Daily, 1 of 1 cycle,  Start date: --, End date: --     2/19/2024 -  Chemotherapy    Treatment Summary   Plan Name: OP BEVACIZUMAB Q4W  Treatment Goal: Control  Status: Active  Start Date: 2/19/2024  End Date: 1/9/2025 (Planned)  Provider: Marah Santo MD  Chemotherapy: bevacizumab-awwb (MVASI) 600 mg in sodium chloride 0.9% 100 mL infusion, 625 mg (100 % of original dose 5 mg/kg), Intravenous, Clinic/[facility] 1 time, 2 of 12 cycles  Dose modification: 5 mg/kg (original dose 5 mg/kg, Cycle 1, Reason: Other (see comments), Comment: Given with Lonsurf)  Administration: 600 mg (2/19/2024), 600 mg (3/7/2024), 600 mg (3/21/2024)         Objective:  Gail Mckinnon arrived promptly for the session. The patient was fully cooperative throughout the session.  Appearance: age appropriate, appropriately  dressed, adequately  groomed  Behavior/Cooperation: friendly and cooperative  Speech: normal in rate, volume, and tone  Mood: euthymic, sad  Affect: mood congruent and tearful at times  Thought Process: goal-directed, logical  Thought Content: normal,  No delusions or paranoia; did not appear to be responding to internal stimuli during the session  Orientation: grossly intact  Memory: grossly intact  Attention Span/Concentration: Attends to session without distraction; reports no difficulty  Fund of Knowledge: average  Estimate of Intelligence: average from verbal skills and history  Cognition: grossly intact  Insight: patient has awareness of illness; good insight into own behavior and behavior of others  Judgment: the patient's behavior is adequate to circumstances    NCCN Distress thermometer:       3/21/2024     8:24 AM 3/6/2024     3:50 PM 2/19/2024    12:21 PM 2/6/2024     9:34 AM 1/25/2024     2:30 PM 1/22/2024    11:16 AM 1/11/2024     1:38 PM   DISTRESS SCREENING   Distress Score 0 - No Distress 4 6 5 6 7 6   Practical Concerns  Finances;Treatment decisions Housing;Finances;Treatment decisions Treatment decisions Finances;Treatment  "decisions None of these None of these   Social Concerns  None of these Relationship with children None of these None of these None of these None of these   Emotional Concerns  Worry or anxiety;Sadness or depression;Fear;Loneliness;Anger Worry or anxiety;Sadness or depression;Anger None of these Worry or anxiety;Sadness or depression;Loss of interest or enjoyment;Fear Sadness or depression;Worry or anxiety;Grief or loss;Loss of interest or enjoyment;Fear Sadness or depression   Spiritual or Mandaeism Concerns  Death, dying, or afterlife None of these None of these None of these None of these None of these   Physical Concerns Pain Fatigue;Memory or concentration;Changes in eating Pain;Fatigue None of these Pain;Fatigue  Fatigue   Other Problems       "A little emotional recently"        Interval history and content of current session: Discussed current adaptation to disease and treatment status and family's adaptation to disease and treatment status. Reports to be coping  with some difficulty . Ms. Mckinnon reported that she has been living day by day with ups and downs. She reported being in a "slump" after finding out she has 1 year to live. However, she notices she is coming out of the slump by focusing on living her life. She had a period of time with intense pain and she was unable to eat but this has since resolved which has improved her overall mood. She reported having a good social support system. Ms. Mckinnon' daughter gave her a journal with specific daily prompts which she is looking forward to completing daily.      Risk parameters:   Patient reports no suicidal ideation  Patient reports no homicidal ideation  Patient reports no self-injurious behavior  Patient reports no violent behavior   Safety needs:  None at this time      Verbal deficits: None     Patient's response to intervention:The patient's response to intervention is accepting.     Progress toward goals and other mental status changes:  The " patient's progress toward goals is fair .      Progress to date:Progress - Ongoing, but Slow      Goals from last visit: Attempted, partially met        Patient Strengths: verbal, intelligent, successful, good social support, good insight, commitment to wellness, strong miah      Treatment Plan:individual psychotherapy  Target symptoms: adjustment  Why chosen therapy is appropriate versus another modality: evidence based practice  Outcome monitoring methods: self-report  Therapeutic intervention type: supportive psychotherapy  Prognosis: Good      Behavioral goals:    Exercise: intentionally engage in environmental exercise by moving around her home and completing daily tasks   Stress management: engage in behavioral activation daily to reduce her symptoms of depression   Social engagement:continue to engage with her social support system    Return to clinic: 3 weeks     Length of Service (minutes direct face-to-face contact):  33 minutes    Diagnosis:     ICD-10-CM ICD-9-CM   1. Adjustment disorder with mixed anxiety and depressed mood  F43.23 309.28   2. Moderate episode of recurrent major depressive disorder  F33.1 296.32                     Myla Gallagher, PhD  Clinical Health Psychology Fellow

## 2024-04-04 PROBLEM — N17.9 ACUTE KIDNEY FAILURE: Status: ACTIVE | Noted: 2024-01-01

## 2024-04-04 PROBLEM — E86.0 DEHYDRATION: Status: ACTIVE | Noted: 2024-01-01

## 2024-04-04 NOTE — PROGRESS NOTES
Subjective     Patient ID: Gail Mckinnon is a 53 y.o. female.    Chief Complaint: Malignant neoplasm of sigmoid colon    Oncology History   Malignant neoplasm of sigmoid colon   3/16/2020 Initial Diagnosis     Malignant neoplasm of sigmoid colon      3/31/2020 Cancer Staged     Staging form: Colon and Rectum, AJCC 8th Edition  - Clinical stage from 3/31/2020: Stage IIIC (cT4b, cN2a, cM0)      5/6/2020 - 11/17/2020 Chemotherapy     Treatment Summary   Plan Name: OP FOLFOX 6 Q2W  Treatment Goal: Curative  Status: Inactive  Start Date: 5/6/2020  End Date: 11/6/2020  Provider: Dylan Leyva MD  Chemotherapy: fluorouraciL injection 945 mg, 400 mg/m2 = 945 mg, Intravenous, Clinic/HOD 1 time, 14 of 14 cycles  Administration: 945 mg (5/6/2020), 945 mg (5/20/2020), 945 mg (6/3/2020), 945 mg (6/17/2020), 945 mg (7/29/2020), 945 mg (8/12/2020), 945 mg (8/26/2020), 945 mg (9/9/2020), 945 mg (9/23/2020), 945 mg (10/6/2020), 945 mg (10/21/2020), 945 mg (11/4/2020)  fluorouraciL 2,400 mg/m2 = 5,665 mg in sodium chloride 0.9% 240 mL chemo infusion, 2,400 mg/m2 = 5,665 mg, Intravenous, Over 46 hours, 14 of 14 cycles  Administration: 5,665 mg (5/6/2020), 5,665 mg (5/20/2020), 5,665 mg (6/3/2020), 5,665 mg (6/17/2020), 5,665 mg (7/29/2020), 5,665 mg (8/12/2020), 5,665 mg (8/26/2020), 5,665 mg (9/9/2020), 5,665 mg (9/23/2020), 5,665 mg (10/6/2020), 5,665 mg (10/21/2020), 5,665 mg (11/4/2020)  leucovorin calcium 900 mg in dextrose 5 % 250 mL infusion, 945 mg, Intravenous, Clinic/HOD 1 time, 14 of 14 cycles  Administration: 900 mg (5/6/2020), 900 mg (5/20/2020), 900 mg (6/3/2020), 900 mg (6/17/2020), 945 mg (6/30/2020), 945 mg (7/20/2020), 945 mg (7/29/2020), 945 mg (8/12/2020), 945 mg (8/26/2020), 945 mg (9/9/2020), 945 mg (9/23/2020), 945 mg (10/6/2020), 945 mg (10/21/2020), 945 mg (11/4/2020)  oxaliplatin (ELOXATIN) 200 mg in dextrose 5 % 500 mL chemo infusion, 201 mg, Intravenous, Clinic/HOD 1 time, 6 of 6 cycles  Dose  modification: 65 mg/m2 (original dose 85 mg/m2, Cycle 6)  Administration: 200 mg (5/6/2020), 200 mg (5/20/2020), 200 mg (6/3/2020), 200 mg (6/17/2020), 201 mg (6/30/2020), 150 mg (7/20/2020)      5/3/2021 Cancer Staged     Staging form: Colon and Rectum, AJCC 8th Edition  - Clinical stage from 5/3/2021: Stage Unknown (rcT0, cNX, cM1)      6/16/2021 - 8/2/2023 Chemotherapy     Treatment Summary   Plan Name: OP COLORECTAL FOLFIRI + BEVACIZUMAB Q2W  Treatment Goal: Control  Status: Inactive  Start Date: 6/16/2021  End Date: 8/2/2023  Provider: Marah Santo MD  Chemotherapy: fluorouraciL injection 990 mg, 400 mg/m2 = 990 mg, Intravenous, Clinic/\[facility]\"" 1 time, 15 of 15 cycles  Administration: 990 mg (6/16/2021), 990 mg (6/30/2021), 990 mg (7/14/2021), 980 mg (7/28/2021), 990 mg (8/11/2021), 990 mg (8/25/2021), 990 mg (9/8/2021), 990 mg (9/22/2021), 990 mg (10/6/2021), 990 mg (10/20/2021), 990 mg (11/3/2021), 990 mg (12/1/2021), 990 mg (12/15/2021), 990 mg (11/17/2021), 990 mg (12/28/2021)  fluorouraciL 2,400 mg/m2 = 5,950 mg in sodium chloride 0.9% 240 mL chemo infusion, 2,400 mg/m2 = 5,950 mg, Intravenous, Over 46 hours, 43 of 46 cycles  Administration: 5,950 mg (6/16/2021), 5,950 mg (6/30/2021), 5,950 mg (7/14/2021), 5,880 mg (7/28/2021), 5,930 mg (8/11/2021), 5,930 mg (8/25/2021), 5,930 mg (9/8/2021), 5,930 mg (9/22/2021), 5,930 mg (10/6/2021), 5,930 mg (10/20/2021), 5,930 mg (11/3/2021), 5,930 mg (12/1/2021), 5,930 mg (12/15/2021), 5,930 mg (11/17/2021), 5,930 mg (12/28/2021), 6,050 mg (5/11/2022), 6,025 mg (3/9/2022), 6,025 mg (2/23/2022), 5,930 mg (2/2/2022), 5,930 mg (1/19/2022), 6,050 mg (4/20/2022), 6,025 mg (4/6/2022), 6,025 mg (3/23/2022), 6,050 mg (6/1/2022), 6,050 mg (6/15/2022), 6,050 mg (10/19/2022), 6,050 mg (10/5/2022), 6,050 mg (11/2/2022), 6,050 mg (11/16/2022), 6,050 mg (11/30/2022), 6,050 mg (1/4/2023), 6,050 mg (12/19/2022), 6,050 mg (1/18/2023), 6,000 mg (5/22/2023), 6,000 mg (4/26/2023), 6,000 mg  (4/11/2023), 6,000 mg (2/28/2023), 6,050 mg (2/14/2023), 6,000 mg (2/1/2023), 6,000 mg (7/5/2023), 6,000 mg (6/19/2023), 6,000 mg (6/5/2023), 6,000 mg (7/31/2023)  bevacizumab (AVASTIN) 600 mg in sodium chloride 0.9% 100 mL chemo infusion, 660 mg, Intravenous, [facility] 1 time, 36 of 36 cycles  Dose modification: 5 mg/kg (original dose 5 mg/kg, Cycle 26, Reason: MD Discretion, Comment: 5 mg/kg original dose)  Administration: 600 mg (6/30/2021), 600 mg (7/14/2021), 600 mg (7/28/2021), 600 mg (6/16/2021), 600 mg (8/11/2021), 655 mg (8/25/2021), 600 mg (9/8/2021), 600 mg (9/22/2021), 600 mg (10/6/2021), 600 mg (10/20/2021), 600 mg (11/3/2021), 655 mg (12/1/2021), 600 mg (12/15/2021), 600 mg (11/17/2021), 600 mg (12/28/2021), 600 mg (5/11/2022), 600 mg (3/9/2022), 600 mg (2/23/2022), 600 mg (2/2/2022), 600 mg (1/19/2022), 600 mg (4/20/2022), 680 mg (4/6/2022), 600 mg (3/23/2022), 680 mg (10/5/2022), 680 mg (10/19/2022), 680 mg (11/2/2022), 680 mg (11/16/2022), 680 mg (11/30/2022), 680 mg (1/4/2023), 680 mg (12/19/2022), 680 mg (1/18/2023), 680 mg (3/28/2023), 680 mg (3/14/2023), 680 mg (2/28/2023), 680 mg (2/14/2023), 680 mg (2/1/2023)  irinotecan (CAMPTOSAR) 440 mg in sodium chloride 0.9% 500 mL chemo infusion, 446 mg, Intravenous, [facility] 1 time, 45 of 48 cycles  Dose modification: 180 mg/m2 (original dose 180 mg/m2, Cycle 24)  Administration: 440 mg (6/16/2021), 440 mg (6/30/2021), 440 mg (7/14/2021), 440 mg (7/28/2021), 440 mg (8/11/2021), 444 mg (8/25/2021), 440 mg (9/8/2021), 440 mg (9/22/2021), 440 mg (10/6/2021), 440 mg (10/20/2021), 440 mg (11/3/2021), 440 mg (12/1/2021), 440 mg (12/15/2021), 440 mg (11/17/2021), 440 mg (12/28/2021), 440 mg (5/11/2022), 440 mg (3/9/2022), 440 mg (2/23/2022), 440 mg (2/2/2022), 440 mg (1/19/2022), 440 mg (4/20/2022), 440 mg (4/6/2022), 440 mg (3/24/2022), 440 mg (6/1/2022), 440 mg (6/15/2022), 440 mg (10/19/2022), 440 mg (10/5/2022), 440 mg (11/2/2022), 440 mg (11/16/2022),  440 mg (11/30/2022), 440 mg (1/4/2023), 440 mg (12/19/2022), 440 mg (1/18/2023), 440 mg (5/22/2023), 440 mg (4/26/2023), 440 mg (4/11/2023), 440 mg (3/28/2023), 440 mg (3/14/2023), 440 mg (2/28/2023), 440 mg (2/14/2023), 440 mg (2/1/2023), 440 mg (7/5/2023), 440 mg (6/19/2023), 440 mg (6/5/2023), 440 mg (7/31/2023)  bevacizumab-awwb (MVASI) 5 mg/kg = 680 mg in sodium chloride 0.9% 100 mL infusion, 5 mg/kg = 680 mg (100 % of original dose 5 mg/kg), Intravenous, Clinic/HOD 1 time, 7 of 10 cycles  Dose modification: 5 mg/kg (original dose 5 mg/kg, Cycle 39)  Administration: 680 mg (4/11/2023), 680 mg (4/26/2023), 680 mg (5/22/2023), 680 mg (7/5/2023), 680 mg (6/19/2023), 680 mg (6/5/2023), 680 mg (7/31/2023)      8/17/2023 - 8/18/2023 Chemotherapy     Treatment Summary   Plan Name: OP CETUXIMAB 500MG Q2W  Treatment Goal: Control  Status: Inactive  Start Date:   End Date:   Provider: Marah Santo MD  Chemotherapy: [No matching medication found in this treatment plan]      8/24/2023 - 12/29/2023 Chemotherapy     Treatment Summary   Plan Name: [facility] - ARM 1 ENCORAFENIB  CETUXIMAB NIVOLUMAB  Treatment Goal: Palliative  Status: Inactive  Start Date: 8/24/2023  End Date: 12/29/2023  Provider: Marah Santo MD  Chemotherapy: cetuximab (ERBITUX) 100 mg/50 mL chemo infusion 1,200 mg, 1,230 mg, Intravenous, Clinic/HOD 1 time, 5 of 12 cycles  Administration: 1,200 mg (8/24/2023), 1,200 mg (9/8/2023), 1,200 mg (9/22/2023), 1,200 mg (10/6/2023), 1,200 mg (10/20/2023), 1,200 mg (11/3/2023), 1,200 mg (11/17/2023), 1,200 mg (12/1/2023), 1,200 mg (12/15/2023), 1,200 mg (12/29/2023)  encorafenib 75 mg Cap, 300 mg, Oral, Daily, 1 of 1 cycle, Start date: --, End date: --      2/19/2024 -  Chemotherapy     Treatment Summary   Plan Name: OP BEVACIZUMAB Q4W  Treatment Goal: Control  Status: Active  Start Date: 2/19/2024 (Planned)  End Date: 1/6/2025 (Planned)  Provider: Marah Santo MD  Chemotherapy: bevacizumab-awwb (MVASI) 5  "mg/kg = 625 mg in sodium chloride 0.9% 100 mL infusion, 5 mg/kg = 625 mg (100 % of original dose 5 mg/kg), Intravenous, Clinic/HOD 1 time, 0 of 12 cycles  Dose modification: 5 mg/kg (original dose 5 mg/kg, Cycle 1, Reason: Other (see comments), Comment: Given with Lonsurf)         PET scan in 12/2023: Progression of disease with multiple new and enlarging lymph nodes throughout the pelvis and mesentery along with new metastatic FDG avid mediastinal adenopathy as above.      CT chest, abdomen/pelvis on 1/4/2024: There is an increased but small non water density pericardial effusion. Mediastinal and hilar lymph nodes are largely stable as are multiple pulmonary nodules.. While the recist index lesions have not significantly changed in size there is increasing mesenteric and extraperitoneal adenopathy when compared to December 4, 2023.  There is also increasing 4th duodenum and proximal jejunal wall thickening and adjacent inflammation"     SUMMARY:  [id number] poorly differentiated adenocarcinoma, MSI stable, B Adán mutation positive   Currently stage IV   She completed adjuvant chemotherapy, 4 cycles of FOLFOX and the remainder infusional 5 FU, completed in November 2020. Oxaliplatin was stopped due to severe adverse reaction, initially noted itching, then ear closing.   Follow-up CTs and PET in May 2021 showed enlarging retroperitoneal node, concerning for metastatic disease.    She started FOLFIRI + Avastin continued till July 2022.   Given isolated RP toni disease, she underwent open Left periaortic and aortocaval lymph node dissection on 7/12/2022 at [facility]. Resumed FOLFIRI+ Debo with relatively stable disease but noted disease progression on scans in August 2023.     She enrolled into  SWOG 2107 (encorafenib/cetuximab + nivo) , initial scans showed improvement, second scans showed some new lesions, now growing after a short term repeat.  As per Recist calculations, the target lesions are stable. However, there is  " POD in the left paracolic gutter, seen on PET scan as well. She was taken off trial until 12/2023 .      She then started on next line of SOC treatment with Lonsurf and Avastin. Treatment delayed due to recent hospitalization for pancreatitis, likely immune therapy related, improving on steroids. Completed taper. Started Lonsurf on 2/12/2024     HPIShe comes in for Avastin. She started Lonsurf on 3/13/2024-3/17/2024, was supposed to start on 3/20/2024 but felt poorly and so stooped and started on 3/27/2024 and completed 5 days on 3/31/2024  She feels very tired, decreased appetite, sometimes certain foods hurt to eat  She notes left leg/groin pain since 1/2024 but now worse, US lower extremity on 3/20/2024 was negative for clots. Pain worsens when she stands     Review of Systems   Constitutional:  Positive for appetite change and fatigue. Negative for unexpected weight change.   HENT:  Negative for mouth sores.    Eyes:  Negative for visual disturbance.   Respiratory:  Negative for cough and shortness of breath.    Cardiovascular:  Negative for chest pain.   Gastrointestinal:  Negative for abdominal pain and diarrhea.   Genitourinary:  Negative for frequency.   Musculoskeletal:  Negative for back pain.        Left groin pain   Integumentary:  Negative for rash.   Neurological:  Negative for headaches.   Hematological:  Negative for adenopathy.   Psychiatric/Behavioral:  The patient is not nervous/anxious.    All other systems reviewed and are negative.         Objective     Physical Exam  Vitals reviewed.   Constitutional:       Appearance: She is well-developed. She is ill-appearing.   HENT:      Mouth/Throat:      Pharynx: No oropharyngeal exudate.   Cardiovascular:      Rate and Rhythm: Normal rate.      Heart sounds: Normal heart sounds.   Pulmonary:      Effort: Pulmonary effort is normal.      Breath sounds: Normal breath sounds. No wheezing.   Abdominal:      General: Bowel sounds are normal. There is  distension.      Palpations: Abdomen is soft.      Tenderness: There is no abdominal tenderness.   Musculoskeletal:         General: No tenderness.   Lymphadenopathy:      Cervical: No cervical adenopathy.   Skin:     General: Skin is warm and dry.      Findings: No rash.   Neurological:      Mental Status: She is alert and oriented to person, place, and time.      Coordination: Coordination normal.   Psychiatric:         Thought Content: Thought content normal.         Judgment: Judgment normal.              LABS:  WBC   Date Value Ref Range Status   04/04/2024 1.80 (LL) 3.90 - 12.70 K/uL Final     Comment:     wbc   critical result(s) called and verbal readback obtained from    Lee Ding rn by I 04/04/2024 08:52       Hemoglobin   Date Value Ref Range Status   04/04/2024 12.3 12.0 - 16.0 g/dL Final     Hematocrit   Date Value Ref Range Status   04/04/2024 37.4 37.0 - 48.5 % Final     Platelets   Date Value Ref Range Status   04/04/2024 292 150 - 450 K/uL Final     Gran # (ANC)   Date Value Ref Range Status   04/04/2024 0.9 (L) 1.8 - 7.7 K/uL Final     Gran %   Date Value Ref Range Status   04/04/2024 51.1 38.0 - 73.0 % Final       Chemistry        Component Value Date/Time     03/20/2024 1218    K 3.4 (L) 03/20/2024 1218     03/20/2024 1218    CO2 25 03/20/2024 1218    BUN 12 03/20/2024 1218    CREATININE 1.5 (H) 03/20/2024 1218     (H) 03/20/2024 1218        Component Value Date/Time    CALCIUM 10.0 03/20/2024 1218    ALKPHOS 149 (H) 03/20/2024 1218    AST 18 03/20/2024 1218    ALT 11 03/20/2024 1218    BILITOT 0.5 03/20/2024 1218    ESTGFRAFRICA >60 06/15/2022 1238    ESTGFRAFRICA >60 06/15/2022 1238    EGFRNONAA >60 06/15/2022 1238    EGFRNONAA >60 06/15/2022 1238          Assessment and Plan     1. Groin pain, left  -     X-Ray Hip 2 or 3 views Left (with Pelvis when performed); Future; Expected date: 04/04/2024  -     X-Ray Femur 2 View Left; Future; Expected date: 04/04/2024    2.  Dehydration    3. Acute renal failure, unspecified acute renal failure type    4. Hypokalemia  -     potassium chloride (MICRO-K) 10 MEQ CpSR; Take 4 capsules (40 mEq total) by mouth once daily.  Dispense: 120 capsule; Refill: 3    5. Malignant neoplasm of sigmoid colon  -     NM PET CT FDG Skull Base to Mid Thigh; Future; Expected date: 04/04/2024    6. 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 Secondary adenocarcinoma of lymph node    8. Secondary liver cancer    9. Secondary malignant neoplasm of retroperitoneum  Overview:  Formatting of this note might be different from the original.  Added automatically from request for surgery [id number]      10. Immunodeficiency due to chemotherapy    Other orders  -     prochlorperazine tablet 10 mg  -     sodium chloride 0.9% bolus 2,000 mL 2,000 mL  -     sodium chloride 0.9% flush 10 mL  -     heparin, porcine (PF) 100 unit/mL injection flush 500 Units  -     alteplase injection 2 mg        Route Chart for Scheduling    Med Onc Chart Routing  Urgent    Follow up with physician . IV fluids today, Cancel Avastin. Schedule PET scan next available. Then schedule top see me with CBC, CMP and for Avastin   Follow up with TAVO    Infusion scheduling note    Injection scheduling note    Labs    Imaging    Pharmacy appointment    Other referrals            Treatment Plan Information   OP BEVACIZUMAB Q4W   Marah Santo MD   Upcoming Treatment Dates - OP BEVACIZUMAB Q4W    4/4/2024       Chemotherapy       bevacizumab-awwb (MVASI) 5 mg/kg = 625 mg in sodium chloride 0.9% 100 mL infusion  4/18/2024       Chemotherapy       bevacizumab-awwb (MVASI) 5 mg/kg = 625 mg in sodium chloride 0.9% 100 mL infusion  5/2/2024       Chemotherapy       bevacizumab-awwb (MVASI) 5 mg/kg = 625 mg in sodium chloride 0.9% 100 mL infusion  5/16/2024       Chemotherapy       bevacizumab-awwb (MVASI) 5 mg/kg = 625 mg in sodium chloride 0.9% 100 mL infusion    Supportive Plan Information  OP SHELL  SUPPORTIVE   Swathi Pimentel MD   Upcoming Treatment Dates - OP SHELL SUPPORTIVE    No upcoming days in selected categories.    Therapy Plan Information  PORT FLUSH  Flushes  heparin, porcine (PF) 100 unit/mL injection flush 500 Units  500 Units, Intravenous, Every visit  sodium chloride 0.9% flush 10 mL  10 mL, Intravenous, Every visit    VENOFER (IRON SUCROSE) QW  Medications  iron sucrose injection 200 mg  200 mg, Intravenous, 1 time a week  Anaphylaxis/Hypersensitivity  EPINEPHrine (EPIPEN) 0.3 mg/0.3 mL pen injection 0.3 mg  0.3 mg, Intramuscular, PRN  diphenhydrAMINE injection 50 mg  50 mg, Intravenous, PRN  hydrocortisone sodium succinate injection 100 mg  100 mg, Intravenous, PRN  Flushes  sodium chloride 0.9% 250 mL flush bag  Intravenous, 1 time a week  sodium chloride 0.9% flush 10 mL  10 mL, Intravenous, 1 time a week  heparin, porcine (PF) 100 unit/mL injection flush 500 Units  500 Units, Intravenous, 1 time a week  alteplase injection 2 mg  2 mg, Intra-Catheter, 1 time a week         Mrs. Mckinnon comes in today for scheduled Avastin maintenance therapy, however she feels poorly.  Labs reveal chemo induced neutropenia if  and a creatinine of 2.  She just completed Lonsurf on 03/31/2024, see HPI above for Lonsurf.  Her chemo induced neutropenia is most likely attributable to recently completing Lonsurf.  We will hold Avastin today and she will receive IV fluids and IV Compazine.  And then we will schedule PET scan and have her return after that for for continuation of therapy    MARILOU she will receive IV fluids today    Left leg and groin pain of unclear etiology, she did have a lower extremity ultrasound in early March which did not reveal any blood clots we will obtain left femur as well as hip x-rays today for further evaluation  Visit today included increased complexity associated with the care of the episodic problem chemotherapy acute kidney injury chemo induced neutropenia addressed and  managing the longitudinal care of the patient due to the serious and/or complex managed problem(s)  colon cancer  Above plan reviewed with the patient and all of her questions were answered to her satisfaction

## 2024-04-04 NOTE — PROGRESS NOTES
Oncology Nutrition   Chemotherapy Infusion Visit    Nutrition Follow Up   RD met with patient at chairside during infusion tx. Pt in a lot of pain today; LCSW met with patient and helping pt getting in touch with palliative care.     Pt denies need of TFP order today. Weight stable per below.    Wt Readings from Last 10 Encounters:   04/04/24 122.4 kg (269 lb 13.5 oz)   04/04/24 122.4 kg (269 lb 13.5 oz)   03/27/24 120.6 kg (265 lb 14 oz)   03/21/24 120.4 kg (265 lb 6.9 oz)   03/21/24 120.4 kg (265 lb 6.9 oz)   03/11/24 119.7 kg (264 lb)   03/07/24 121.6 kg (268 lb)   03/07/24 121.6 kg (268 lb)   02/19/24 124.6 kg (274 lb 11.1 oz)   02/19/24 124.6 kg (274 lb 11.1 oz)       All other nutrition questions/concerns addressed as appropriate. Will continue to follow and monitor throughout treatment PRN.     Raquel Bullock, MS, RD, LDN  04/04/2024  12:27 PM

## 2024-04-04 NOTE — PLAN OF CARE
Pt arrived to clinic today for IVF and Venofer push and tolerated well. No changes throughout therapy. Pt aware of follow up appointments and side effects of drugs. Pt's HTN noted and patient believes it is from her uncontrolled groin pain. Message sent to Fawn Snyder NP to contact patient. Message also sent to digital hypertension to contact patient. Pt denies any headaches or vision changes. Discharged to home. NAD.

## 2024-04-04 NOTE — PROGRESS NOTES
JULIANNE met with pt at chairside. Pt voiced she is in a lot of pain and her BP is elevated. JULIANNE said Fwan NP is managing her pain medication. SW asked pt if she informed Fawn of her increased pain. She said she had not updated her. Raquel KATE assisted pt by sending message to Fawn NP about pt's pain.     SW provided pt with a gas card. SW also provided support. Kept pt's visit brief due to elevated blood pressure.     Radha Giordano, JENNIEW

## 2024-04-05 NOTE — PROGRESS NOTES
The patient location is: [place]  The chief complaint leading to consultation is: cancer related pain    Visit type: audiovisual    Face to Face time with patient: 20  30 minutes of total time spent on the encounter, which includes face to face time and non-face to face time preparing to see the patient (eg, review of tests), Obtaining and/or reviewing separately obtained history, Documenting clinical information in the electronic or other health record, Independently interpreting results (not separately reported) and communicating results to the patient/family/caregiver, or Care coordination (not separately reported).         Each patient to whom he or she provides medical services by telemedicine is:  (1) informed of the relationship between the physician and patient and the respective role of any other health care provider with respect to management of the patient; and (2) notified that he or she may decline to receive medical services by telemedicine and may withdraw from such care at any time.    Notes:         Office Visit  [facility]  Reason for consult: cancer related pain     ASSESSMENT/PLAN:     Plan/Recommendations:  Diagnoses and all orders for this visit:    Cancer related pain  Start flexeril 5 mg every 8 hours as needed for muscle spasms  Continue Percocet 10/325 mg every 4 hours as needed for pain  Continue senna (SENOKOT) 8.6 mg tablet; Take 2 tablets by mouth twice daily.  Continue Gabapentin 300 mg every evening     Malignant neoplasm of sigmoid colon  Continue to follow with Oncology.Patient now on Medicaid and can no longer seek out care at [facility] as they do not accept her insurance                     Palliative care by specialist      Follow up: 1 month    SUBJECTIVE:     History of Present Illness:  Patient is a 53 y.o. 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 She is followed by Palliative Care for cancer  related pain.    Patient seen virtually for follow up to discuss her pain, she reports new pain to left side of groin- feels like muscle cramp, has not responded to percocet.  Patient would like to try muscle relaxant to see if this helps, discussed reasonable to try but she should also inform Oncology if symptoms do not subside       Abdominal pain had been stable - she goes between constipation and diarrhea, she uses probiotic and senna when she is constipated.   Pelvic pain /10, no bleeding - percocet still helps - takes about every 6 hours      Appetite- fair- she  eats about 3 meals per day without stomach cramps  Sleep- good   Mood - sometimes angry, but denies SI/HI, she also denies feeling hopeless. She is supported by her mother. She is currently on wellbutrin, added cymbalta by Dr Hill - did not find it helpful- she stopped taking / Her Psychiatrists is Sammy Pino- encouraged to follow up        Past Medical History:   Diagnosis Date    Anxiety     Depression     FH: ovarian cancer 2020    Hx of psychiatric care     Effexor, Paxil, Lexapro, Zoloft, Wellbutrin, Trazodone Buspar    Hypertension     Hyperthyroidism     Hypothyroid     Kidney calculi     Malignant neoplasm of sigmoid colon 2020    Menorrhagia     Multinodular goiter 2012    Palpitation     Psychiatric problem     Venous insufficiency     Vulvar lesion      Past Surgical History:   Procedure Laterality Date     SECTION, CLASSIC      x3    COLONOSCOPY N/A 2020    Procedure: COLONOSCOPY;  Surgeon: Shane Parker MD;  Location: [facility];  Service: Endoscopy;  Laterality: N/A;    COLONOSCOPY N/A 2022    Procedure: COLONOSCOPY;  Surgeon: Shane Parker MD;  Location: [facility] ENDO;  Service: Endoscopy;  Laterality: N/A;    COLONOSCOPY N/A 2023    Procedure: COLONOSCOPY;  Surgeon: Shane Parker MD;  Location: [facility];  Service: Endoscopy;  Laterality: N/A;  no cecum anastomosis     CYSTOSCOPY W/ URETERAL STENT PLACEMENT Bilateral 03/25/2020    Procedure: CYSTOSCOPY, WITH URETERAL STENT INSERTION;  Surgeon: Claudio Tyson MD;  [facility];  Service: Urology;  Laterality: Bilateral;    EXAMINATION UNDER ANESTHESIA N/A 1/16/2024    Procedure: Exam under anesthesia-vagina;  Surgeon: Eli Her MD;  Location: [facility] OR;  Service: OB/GYN;  Laterality: N/A;    EXCISION-WIDE LOCAL Left 1/16/2024    Procedure: EXCISION-WIDE LOCAL -  Labia Majora;  Surgeon: Eli Her MD;  Location: [facility] OR;  Service: OB/GYN;  Laterality: Left;  upper left side of labia majora    EXCISIONAL BIOPSY, LYMPH NODE  07/2022    abdominal x 4    INSERTION OF TUNNELED CENTRAL VENOUS CATHETER (CVC) WITH SUBCUTANEOUS PORT N/A 05/01/2020    Procedure: [id number];  Surgeon: Stephen Diagnostic Provider;  [facility];  Service: Radiology;  Laterality: N/A;  Room 189/[facility]    LAPAROSCOPIC SIGMOIDECTOMY N/A 03/25/2020    Procedure: COLECTOMY, SIGMOID, LAPAROSCOPIC, flex sig, ERAS high;  Surgeon: Silvio Man MD;  [facility];  Service: Colon and Rectal;  Laterality: N/A;    MOBILIZATION OF SPLENIC FLEXURE  03/25/2020    Procedure: MOBILIZATION, SPLENIC FLEXURE;  Surgeon: Silvio Man MD;  [facility];  Service: Colon and Rectal;;    TONSILLECTOMY      TOTAL ABDOMINAL HYSTERECTOMY W/ BILATERAL SALPINGOOPHORECTOMY N/A 03/25/2020    Procedure: HYSTERECTOMY, TOTAL, ABDOMINAL, WITH BILATERAL SALPINGO-OOPHORECTOMY;  Surgeon: Keron Brady MD;  Location: [facility] OR [address] [place] [address];  Service: Oncology;  Laterality: N/A;     Family History   Problem Relation Age of Onset    Heart disease Father     Diabetes Mother 65    Drug abuse Brother     Drug abuse Brother     Cancer Maternal Aunt         lung cancer    Cancer Maternal Grandmother         stomach cancer- started in ovaries    Ovarian cancer Maternal Grandmother         stomach cancer- started in ovaries     Colon cancer Paternal Uncle     Cancer Paternal Uncle     Ovarian cancer Maternal Aunt     Ovarian cancer Maternal Aunt     Ovarian cancer Cousin         mother had ovarian cancer    Drug abuse Son     Drug abuse Son         clean/sober since 2012    Colon cancer Son     No Known Problems Daughter     Uterine cancer Neg Hx     Breast cancer Neg Hx      Review of patient's allergies indicates:   Allergen Reactions    Oxaliplatin Other (See Comments)     Itchy hands, throat closed up, trouble hearing - during infusion    Penicillins Hives and Rash     childhood allergy       Social Determinants of Health     Tobacco Use: Low Risk  (4/4/2024)    Patient History     Smoking Tobacco Use: Never     Smokeless Tobacco Use: Never     Passive Exposure: Not on file   Alcohol Use: Not At Risk (2/19/2024)    AUDIT-C     Frequency of Alcohol Consumption: Never     Average Number of Drinks: Patient does not drink     Frequency of Binge Drinking: Less than monthly   Financial Resource Strain: High Risk (2/19/2024)    Overall Financial Resource Strain (CARDIA)     Difficulty of Paying Living Expenses: Very hard   Food Insecurity: No Food Insecurity (2/19/2024)    Hunger Vital Sign     Worried About Running Out of Food in the Last Year: Never true     Ran Out of Food in the Last Year: Never true   Transportation Needs: No Transportation Needs (2/19/2024)    PRAPARE - Transportation     Lack of Transportation (Medical): No     Lack of Transportation (Non-Medical): No   Physical Activity: Insufficiently Active (2/19/2024)    Exercise Vital Sign     Days of Exercise per Week: 1 day     Minutes of Exercise per Session: 20 min   Stress: Stress Concern Present (2/19/2024)    [employer] - Occupational Stress Questionnaire     Feeling of Stress : To some extent   Social Connections: Unknown (2/19/2024)    Social Connection and Isolation Panel [NHANES]     Frequency of Communication with Friends and Family:  More than three times a week     Frequency of Social Gatherings with Friends and Family: Twice a week     Attends Restorationist Services: Not on file     Active Member of Clubs or Organizations: No     Attends Club or Organization Meetings: Never     Marital Status:    Housing Stability: High Risk (2/19/2024)    Housing Stability Vital Sign     Unable to Pay for Housing in the Last Year: Yes     Number of Places Lived in the Last Year: 1     Unstable Housing in the Last Year: No   Depression: Medium Risk (3/27/2024)    Depression     Last PHQ-4: Flowsheet Data: 6      The Modified Caregiver Strain Index (MCSI) -   No data recorded   No data recorded   No data recorded   No data recorded   No data recorded   No data recorded   No data recorded   No data recorded   No data recorded   No data recorded   No data recorded   No data recorded   No data recorded   No data recorded       Medications:    Current Outpatient Medications:     acetaminophen (TYLENOL) 500 MG tablet, Take 2 tablets (1,000 mg total) by mouth every 8 (eight) hours., Disp: 60 tablet, Rfl: 0    buPROPion (WELLBUTRIN SR) 150 MG TBSR 12 hr tablet, Take 1 tablet (150 mg total) by mouth 2 (two) times daily., Disp: 180 tablet, Rfl: 0    cinacalcet (SENSIPAR) 30 MG Tab, Take 1 tablet (30 mg total) by mouth daily with breakfast., Disp: 30 tablet, Rfl: 11    dicyclomine (BENTYL) 20 mg tablet, Take 20 mg by mouth 2 (two) times daily., Disp: , Rfl:     DULoxetine (CYMBALTA) 30 MG capsule, Take 2 capsules (60 mg total) by mouth once daily., Disp: 60 capsule, Rfl: 0    famotidine (PEPCID) 40 MG tablet, Take 1 tablet (40 mg total) by mouth once daily., Disp: 30 tablet, Rfl: 11    gabapentin (NEURONTIN) 300 MG capsule, Take 1 capsule (300 mg total) by mouth every evening., Disp: 30 capsule, Rfl: 0    Lactobacillus rhamnosus GG (CULTURELLE) 10 billion cell capsule, Take 1 capsule by mouth once daily., Disp: , Rfl:     lactulose (CHRONULAC) 10 gram/15 mL solution,  Take 15 mLs (10 g total) by mouth every 6 (six) hours as needed (constipation)., Disp: 150 mL, Rfl: 1    levoFLOXacin (LEVAQUIN) 750 MG tablet, Take 1 tablet (750 mg total) by mouth once daily. **NEW RX NOT STARTED YET**, Disp: , Rfl:     levothyroxine (SYNTHROID) 175 MCG tablet, Take 2 tablets (350 mcg total) by mouth before breakfast., Disp: 60 tablet, Rfl: 11    LIDOcaine-prilocaine (EMLA) cream, Apply topically as needed (30 to 60 min prior chemo or port use)., Disp: 30 g, Rfl: 3    LORazepam (ATIVAN) 1 MG tablet, Take 1 tablet (1 mg total) by mouth every 12 (twelve) hours as needed for Anxiety (nausea)., Disp: 30 tablet, Rfl: 0    magic mouthwash diphen/antac/lidoc/nysta, Swish and swallow 10 ml by mouth 4 times daily, Disp: 500 mL, Rfl: 6    metroNIDAZOLE (FLAGYL) 500 MG tablet, Take 1 tablet (500 mg total) by mouth every 8 (eight) hours. **NEW RX NOT STARTED YET**, Disp: , Rfl:     mirtazapine (REMERON) 7.5 MG Tab, Take 1 tablet (7.5 mg total) by mouth every evening. (Patient taking differently: Take 7.5 mg by mouth nightly as needed.), Disp: 30 tablet, Rfl: 0    multivitamin (THERAGRAN) per tablet, Take 1 tablet by mouth once daily., Disp: , Rfl:     olmesartan (BENICAR) 20 MG tablet, Take 1 tablet (20 mg total) by mouth once daily., Disp: 30 tablet, Rfl: 5    ondansetron (ZOFRAN-ODT) 4 MG TbDL, Take 1 tablet (4 mg total) by mouth every 8 (eight) hours as needed (Nausea)., Disp: 20 tablet, Rfl: 0    ondansetron (ZOFRAN-ODT) 8 MG TbDL, Take 1 tablet (8 mg total) by mouth every 8 (eight) hours as needed., Disp: 60 tablet, Rfl: 3    oxyCODONE (ROXICODONE) 5 MG immediate release tablet, Take 1 tablet (5 mg total) by mouth every 4 (four) hours as needed for Pain (Abdominal pain)., Disp: 8 tablet, Rfl: 0    potassium chloride (MICRO-K) 10 MEQ CpSR, Take 4 capsules (40 mEq total) by mouth once daily., Disp: 120 capsule, Rfl: 3    prochlorperazine (COMPAZINE) 10 MG tablet, Take 1 tablet (10 mg total) by mouth every  6 (six) hours as needed., Disp: 20 tablet, Rfl: 0    senna (SENOKOT) 8.6 mg tablet, Take 2 tablets by mouth once daily., Disp: 60 tablet, Rfl: 2    trifluridine-tipiraciL (LONSURF) 20-8.19 mg Tab, Take 35 mg/m2 by mouth 2 (two) times daily **NEW RX NOT STARTED YET**., Disp: , Rfl:     trifluridine-tipiraciL (LONSURF) 20-8.19 mg Tab, Take 4 tablets by mouth 2 times daily on days 1 to 5 and days 8 to 12 of a 28-day cycle., Disp: 80 tablet, Rfl: 3  No current facility-administered medications for this visit.    Facility-Administered Medications Ordered in Other Visits:     ondansetron injection 4 mg, 4 mg, Intravenous, Daily PRN, Volpi-Abadie, Jacqueline, MD    sodium chloride 0.9% flush 3 mL, 3 mL, Intravenous, PRN, Volpi-Abadie, Jacqueline, MD    OBJECTIVE:       ROS:  Review of Systems   Constitutional:  Negative for appetite change and unexpected weight change.   HENT:  Negative for mouth sores.    Eyes:  Negative for visual disturbance.   Respiratory:  Negative for cough and shortness of breath.    Cardiovascular:  Negative for chest pain.   Gastrointestinal:  Positive for abdominal pain, constipation and diarrhea. Negative for nausea.   Genitourinary:  Negative for frequency.   Musculoskeletal:  Positive for back pain.   Skin:  Negative for rash.   Neurological:  Negative for headaches.   Hematological:  Negative for adenopathy.   Psychiatric/Behavioral:  The patient is nervous/anxious.        Review of Symptoms      Symptom Assessment (ESAS 0-10 Scale)  Pain:  0  Dyspnea:  0  Anxiety:  0  Nausea:  0  Depression:  0  Anorexia:  0  Fatigue:  0  Insomnia:  0  Restlessness:  0  Agitation:  0       Anxiety:  Is nervous/anxious  Constipation:  Constipation    Performance Status:  80    ECOG Performance Status stGstrstastdstest:st st1st Living Arrangements:  Lives with family and Lives in home    Psychosocial/Cultural:   See Palliative Psychosocial Note: Yes  **Primary  to Follow**  Palliative Care   Consult: No      Advance Care Planning   Advance Directives:   Living Will: No    LaPOST: No    Do Not Resuscitate Status: No      Decision Making:  Patient answered questions  Goals of Care: What is most important right now is to focus on symptom/pain control. Accordingly, we have decided that the best plan to meet the patient's goals includes continuing with treatment.              Physical Exam:  Vitals:    Physical Exam  Pulmonary:      Effort: Pulmonary effort is normal.   Abdominal:      Comments: Obese abdomen   Neurological:      Mental Status: She is alert and oriented to person, place, and time.   Psychiatric:         Mood and Affect: Mood is depressed.         Labs:  CBC:   WBC   Date Value Ref Range Status   04/04/2024 1.80 (LL) 3.90 - 12.70 K/uL Final     Comment:     wbc   critical result(s) called and verbal readback obtained from    Lee Ding rn by CMI 04/04/2024 08:52       Hemoglobin   Date Value Ref Range Status   04/04/2024 12.3 12.0 - 16.0 g/dL Final     Hematocrit   Date Value Ref Range Status   04/04/2024 37.4 37.0 - 48.5 % Final     MCV   Date Value Ref Range Status   04/04/2024 83 82 - 98 fL Final     Platelets   Date Value Ref Range Status   04/04/2024 292 150 - 450 K/uL Final       LFT:   Lab Results   Component Value Date    AST 15 04/04/2024    ALKPHOS 159 (H) 04/04/2024    BILITOT 0.6 04/04/2024       Albumin:   Albumin   Date Value Ref Range Status   04/04/2024 3.4 (L) 3.5 - 5.2 g/dL Final     Protein:   Total Protein   Date Value Ref Range Status   04/04/2024 6.9 6.0 - 8.4 g/dL Final         Signature: Fawn Snyder NP

## 2024-04-08 PROBLEM — M79.605 LEFT LEG PAIN: Status: ACTIVE | Noted: 2024-01-01

## 2024-04-08 NOTE — PROGRESS NOTES
"The patient location is: home  The chief complaint leading to consultation is: leg pain, fatigue    Visit type: audiovisual    Each patient to whom he or she provides medical services by telemedicine is:  (1) informed of the relationship between the physician and patient and the respective role of any other health care provider with respect to management of the patient; and (2) notified that he or she may decline to receive medical services by telemedicine and may withdraw from such care at any time.    Notes:   Gail Mckinnon  54 y.o. is here to seek an integrative approach to discuss side effects related to colon cancer treatment.   HPI  Mrs. Mckinnon reports she went for a colonoscopy in 2020 and was diagnosed with colon cancer with mets. She had a complete hysterectomy and a colon resection and has been on chemotherapy since then. She is on cycle 4 of this trial. She states she was very sick with the previous chemo, but she is much better on this regimen. She is not sleeping very well, but does take Remeron as needed. She does not have a good appetite most of the time although her taste is returning which is helpful. She does drink an Ensure daily. She does feel like her appetite is increasing on this treatment. She tries to be active around the house, but easily fatigues. She states, "I feel like it is a cycle of not doing anything from surgery and treatment that is causing the fatigue." She states, "It is horrible to feel this way as I am usually so active."She reports her stress is better now than previously, but she does have moderate stress. She is learning coping techniques which helps. She states she also stresses regarding finances and possibly finding a part time job. She is seeing Dr. Batres for psychology and she reports this is helpful She has had low back pain for about 3 years. She is unable to stand for long periods without pain.   She id  and has 3 grown children. 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 She has a good support system.     12/15/2023  Mrs. Reynolds is here today getting chemo. She had her first physical therapy yesterday. She will start after the New Year as she is going out of town. She reports she is sleeping better since getting her thyroid medication normalized, but she does remain fatigued. She reports her stress and anxiety are less than previously and rates it at less than 5/10. She reports a good appetite and she has cut her sugar intake. She finds the more sugar she eats, the more she continues to want to eat.  Her activity level remains low, but she is hoping going to PT will help with her fatigue and help increase her activity level.     Today's Visit  Gail reports she is now on Avastin and Lonsurf. She reports abdominal pain and her food does not digest and she is vomiting. She gets full very fast and her food sits in her stomach and she vomits. She saw a gastro NP today goes tomorrow for an EGD. She reports continues fatigue and she takes naps daily. She reports she is sleeping much better at night, approximately 7-8 hours. She does wake up during the night at least once and falls back asleep easily. She reports increased anxiety " with my leg and stomach pain I feel like I am on edge and snappy all of the time." She would like physical therapy for her leg pain. She states it started as a pulled muscle to the left groin area and buttocks and goes down her leg and it has become worse.      Pillars Assessment    Sleep  How many hours of sleep per night? 7-8  Do you have trouble falling asleep, staying asleep or waking up earlier than you need to? no  Do you have daytime fatigue? yes  Do you need medication for sleep? yes Remeron as needed  Do you use any supplements or other interventions for sleep? no    Resilience  Rate your current level of stress- moderate to high    Nutrition   Food allergies or sensitivities: " no  Do you adhere to a particular type of diet? no  Do you have any concerns with your eating habits? no    Exercise  How would you describe your physical activity level? Low    Past Medical History  Past Medical History:   Diagnosis Date    Anxiety     Depression     FH: ovarian cancer 2020    Hx of psychiatric care     Effexor, Paxil, Lexapro, Zoloft, Wellbutrin, Trazodone Buspar    Hypertension     Hyperthyroidism     Hypothyroid     Kidney calculi     Malignant neoplasm of sigmoid colon 2020    Menorrhagia     Multinodular goiter 2012    Palpitation     Psychiatric problem     Venous insufficiency     Vulvar lesion       Past Surgical History   Past Surgical History:   Procedure Laterality Date     SECTION, CLASSIC      x3    COLON SURGERY      COLONOSCOPY N/A 2020    Procedure: COLONOSCOPY;  Surgeon: Shane Parker MD;  Location: [facility];  Service: Endoscopy;  Laterality: N/A;    COLONOSCOPY N/A 2022    Procedure: COLONOSCOPY;  Surgeon: Shane Parker MD;  Location: [facility];  Service: Endoscopy;  Laterality: N/A;    COLONOSCOPY N/A 2023    Procedure: COLONOSCOPY;  Surgeon: Shane Parker MD;  Location: [facility];  Service: Endoscopy;  Laterality: N/A;  no cecum anastomosis    CYSTOSCOPY W/ URETERAL STENT PLACEMENT Bilateral 2020    Procedure: CYSTOSCOPY, WITH URETERAL STENT INSERTION;  Surgeon: Claudio Tyson MD;  Location: [address];  Service: Urology;  Laterality: Bilateral;    EXAMINATION UNDER ANESTHESIA N/A 2024    Procedure: Exam under anesthesia-vagina;  Surgeon: Eli Her MD;  Location: [facility] OR;  Service: OB/GYN;  Laterality: N/A;    EXCISION-WIDE LOCAL Left 2024    Procedure: EXCISION-WIDE LOCAL -  Labia Majora;  Surgeon: Eli Her MD;  Location: [facility] OR;  Service: OB/GYN;  Laterality: Left;  upper left side of labia majora    EXCISIONAL BIOPSY, LYMPH NODE  2022    abdominal x 4    INSERTION OF  TUNNELED CENTRAL VENOUS CATHETER (CVC) WITH SUBCUTANEOUS PORT N/A 05/01/2020    Procedure: [id number];  Surgeon: Stephen Diagnostic Provider;  Location: [facility] OR 2ND FLR;  Service: Radiology;  Laterality: N/A;  Room 189/[facility]    LAPAROSCOPIC SIGMOIDECTOMY N/A 03/25/2020    Procedure: COLECTOMY, SIGMOID, LAPAROSCOPIC, flex sig, ERAS high;  Surgeon: Silvio Man MD;  Location: [facility] OR 2ND FLR;  Service: Colon and Rectal;  Laterality: N/A;    MOBILIZATION OF SPLENIC FLEXURE  03/25/2020    Procedure: MOBILIZATION, SPLENIC FLEXURE;  Surgeon: Silvio Man MD;  Location: [facility] OR 2ND FLR;  Service: Colon and Rectal;;    TONSILLECTOMY      TOTAL ABDOMINAL HYSTERECTOMY W/ BILATERAL SALPINGOOPHORECTOMY N/A 03/25/2020    Procedure: HYSTERECTOMY, TOTAL, ABDOMINAL, WITH BILATERAL SALPINGO-OOPHORECTOMY;  Surgeon: Keron Brady MD;  Location: [facility] OR 2ND FLR;  Service: Oncology;  Laterality: N/A;      Family History   Family History   Problem Relation Age of Onset    Diabetes Mother 65    Heart disease Father     Drug abuse Brother     Drug abuse Brother     Cancer Maternal Aunt         lung cancer    Ovarian cancer Maternal Aunt     Ovarian cancer Maternal Aunt     Colon cancer Paternal Uncle     Cancer Paternal Uncle     Cancer Maternal Grandmother         stomach cancer- started in ovaries    Ovarian cancer Maternal Grandmother         stomach cancer- started in ovaries    No Known Problems Daughter     Drug abuse Son     Drug abuse Son         clean/sober since 2012    Ovarian cancer Cousin         mother had ovarian cancer    Uterine cancer Neg Hx     Breast cancer Neg Hx     Crohn's disease Neg Hx     Ulcerative colitis Neg Hx       Allergies  Review of patient's allergies indicates:   Allergen Reactions    Oxaliplatin Other (See Comments)     Itchy hands, throat closed up, trouble hearing - during infusion    Penicillins Hives and Rash     childhood allergy        Current  Medications:    Current Outpatient Medications:     acetaminophen (TYLENOL) 500 MG tablet, Take 2 tablets (1,000 mg total) by mouth every 8 (eight) hours., Disp: 60 tablet, Rfl: 0    buPROPion (WELLBUTRIN SR) 150 MG TBSR 12 hr tablet, Take 1 tablet (150 mg total) by mouth 2 (two) times daily., Disp: 180 tablet, Rfl: 0    cinacalcet (SENSIPAR) 30 MG Tab, Take 1 tablet (30 mg total) by mouth daily with breakfast., Disp: 30 tablet, Rfl: 11    cyclobenzaprine (FLEXERIL) 5 MG tablet, Take 1 tablet (5 mg total) by mouth 3 (three) times daily as needed for Muscle spasms., Disp: 30 tablet, Rfl: 0    dicyclomine (BENTYL) 20 mg tablet, Take 1 tablet (20 mg total) by mouth 2 (two) times daily as needed (abdominal pain)., Disp: 180 tablet, Rfl: 0    famotidine (PEPCID) 40 MG tablet, Take 1 tablet (40 mg total) by mouth once daily., Disp: 30 tablet, Rfl: 11    gabapentin (NEURONTIN) 300 MG capsule, Take 1 capsule (300 mg total) by mouth every evening., Disp: 30 capsule, Rfl: 0    Lactobacillus rhamnosus GG (CULTURELLE) 10 billion cell capsule, Take 1 capsule by mouth once daily., Disp: , Rfl:     lactulose (CHRONULAC) 10 gram/15 mL solution, Take 15 mLs (10 g total) by mouth every 6 (six) hours as needed (constipation)., Disp: 150 mL, Rfl: 1    levothyroxine (SYNTHROID) 175 MCG tablet, Take 2 tablets (350 mcg total) by mouth before breakfast., Disp: 60 tablet, Rfl: 11    LIDOcaine-prilocaine (EMLA) cream, Apply topically as needed (30 to 60 min prior chemo or port use)., Disp: 30 g, Rfl: 3    LORazepam (ATIVAN) 1 MG tablet, Take 1 tablet (1 mg total) by mouth every 12 (twelve) hours as needed for Anxiety (nausea)., Disp: 30 tablet, Rfl: 0    magic mouthwash diphen/antac/lidoc/nysta, Swish and swallow 10 ml by mouth 4 times daily, Disp: 500 mL, Rfl: 6    mirtazapine (REMERON) 7.5 MG Tab, Take 1 tablet (7.5 mg total) by mouth every evening. (Patient taking differently: Take 7.5 mg by mouth nightly as needed.), Disp: 30 tablet,  Rfl: 0    multivitamin (THERAGRAN) per tablet, Take 1 tablet by mouth once daily., Disp: , Rfl:     olmesartan (BENICAR) 20 MG tablet, Take 1 tablet (20 mg total) by mouth once daily., Disp: 30 tablet, Rfl: 5    ondansetron (ZOFRAN-ODT) 4 MG TbDL, Take 1 tablet (4 mg total) by mouth every 8 (eight) hours as needed (Nausea)., Disp: 20 tablet, Rfl: 0    ondansetron (ZOFRAN-ODT) 8 MG TbDL, Take 1 tablet (8 mg total) by mouth every 8 (eight) hours as needed., Disp: 60 tablet, Rfl: 3    oxyCODONE (ROXICODONE) 5 MG immediate release tablet, Take 1 tablet (5 mg total) by mouth every 4 (four) hours as needed for Pain (Abdominal pain)., Disp: 8 tablet, Rfl: 0    potassium chloride (MICRO-K) 10 MEQ CpSR, Take 4 capsules (40 mEq total) by mouth once daily., Disp: 120 capsule, Rfl: 3    prochlorperazine (COMPAZINE) 10 MG tablet, Take 1 tablet (10 mg total) by mouth every 6 (six) hours as needed., Disp: 20 tablet, Rfl: 0    senna (SENOKOT) 8.6 mg tablet, Take 2 tablets by mouth once daily., Disp: 60 tablet, Rfl: 2    sucralfate (CARAFATE) 1 gram tablet, Take 1 tablet (1 g total) by mouth 4 (four) times daily. for 10 days, Disp: 40 tablet, Rfl: 0    trifluridine-tipiraciL (LONSURF) 20-8.19 mg Tab, Take 4 tablets by mouth 2 times daily on days 1 to 5 and days 8 to 12 of a 28-day cycle., Disp: 80 tablet, Rfl: 3  No current facility-administered medications for this visit.    Facility-Administered Medications Ordered in Other Visits:     ondansetron injection 4 mg, 4 mg, Intravenous, Daily PRN, Volpi-Abadie, Jacqueline, MD    sodium chloride 0.9% flush 3 mL, 3 mL, Intravenous, PRN, Volpi-Abadie, Jacqueline, MD     Review of Systems  Review of Systems   Constitutional:  Positive for malaise/fatigue.   HENT: Negative.     Eyes: Negative.    Respiratory: Negative.     Cardiovascular: Negative.    Gastrointestinal:  Positive for abdominal pain, diarrhea and vomiting.   Genitourinary: Negative.    Musculoskeletal: Negative.    Skin:  Negative.    Neurological: Negative.    Endo/Heme/Allergies: Negative.    Psychiatric/Behavioral:  Positive for depression. The patient is nervous/anxious and has insomnia.       Physical Exam      Physical Exam  Vitals reviewed.   Constitutional:       Appearance: Normal appearance.   Neurological:      Mental Status: She is alert.   Psychiatric:         Mood and Affect: Mood normal.         Behavior: Behavior normal.        ASSESSMENT :  1. Anxiety and depression    2. Left leg pain    3. Chemotherapy-induced fatigue    4. Psychophysiological insomnia    5. Malignant neoplasm of sigmoid colon      PLAN:  Reviewed all information discussed at today's visit and all questions were answered.    Counseled on healthy lifestyle and behavior modifications   Referral to Physical therapy  Referral to Psychiatry  Continue Oncology Psychology  See nutrition during treatment as needed  I discussed and recommended the following support services:  [employer] [employer] I suggested [place] [employer] and/or Yoga as these practices reduce stress, increases flexibility and muscle strength, improves balance and promotes serenity in the power of movement to help fight disease and boost your immune system.   Music and relaxation therapy and Meditation which can decrease stress by lowering blood pressure, slowing breathing, and helping you be more present in the moment. It improves sleep by relaxing the body and mind at the end of the day.Meditation also trains you how to focus on one thing at a time, improving concentration. It also promotes emotional well-being by decreasing depression and anxiety, and helping create a more positive outlook on life.    Follow up with [facility] in 4 months    I spent a total of 48 minutes on the day of the visit.This includes face to face time and non-face to face time preparing to see the patient (eg, review of tests), obtaining and/or reviewing separately obtained history, documenting clinical  information in the electronic or other health record, independently interpreting results and communicating results to the patient/family/caregiver, or care coordinator.

## 2024-04-08 NOTE — PROGRESS NOTES
"Subjective:       Patient ID: Gail Mckinnon is a 54 y.o. female Body mass index is 43.91 kg/m².    Chief Complaint: Abdominal Pain ([facility] Er f/u)    This patient is new to me.  Established patient of Dr. Parker.     Reviewed hospital ER report from 03/11/24,  "Medical Decision Making  Patient is a 53-year-old woman with past medical history of colon cancer and other medical issues as noted above who presents for about 4 days of progressively worsening epigastric abdominal pain associated with nausea and vomiting.  Last chemotherapy 28 days ago.Was scheduled to restart today but oncologist wanted blood work done to check for previous leukopenia.  No change to bowel habits.  No fevers but having chills.  States it feels similar to previous episode of pancreatitis.  Here she is hypertensive, slightly tachycardic, afebrile.  She is tender in the epigastrium without rebound or guarding.  She is 1+ edema bilateral lower extremities.  Concern for pancreatitis, cholecystitis, gastritis, peptic ulcer disease, malignancy related pain.  This does not sound consistent with bowel obstruction, UTI, pyelonephritis, nephrolithiasis.  Patient with improving leukopenia.  Slight hypercalcemia at 10.6.  UA with trace leuk esterase and blood but nitrite negative.  Fourteen squamous cells.  Lipase pending.  Given fluids, Zofran, and Dilaudid.  CT abdomen and pelvis ordered.     See below for CT findings.  Patient able to tolerate small amounts of p.o. without vomiting.  Stomach still slightly unsettled but no episodes of emesis while in the ER.  At this time patient is stable for discharge with prescription for short course of oxycodone secondary to her abdominal pain as well as some Bentyl.  She says she has plenty of Phenergan, Compazine, and Zofran at home.  Patient was instructed on how to use these medications and all of her questions were answered.  She was given strict return precautions."    Abdominal Pain  This is a " "chronic problem. The current episode started more than 1 year ago. The onset quality is sudden. The problem occurs intermittently. Duration: lasts an hour. The problem has been gradually worsening. The pain is located in the epigastric region. The pain is at a severity of 0/10 (denies pain currently). The patient is experiencing no pain. The quality of the pain is burning. The abdominal pain radiates to the periumbilical region (occasionally radiated to periumbilical area). Associated symptoms include belching (more frequent the past week; also has abdominal bloating), constipation (hx of constipation, but currently having 1-2 BMs daily that are like pudding or water recently; has not taken Senokot or lactulose recently; currently taking probiotic daily; currently takes oxycodone p.r.n. daily for bladder pain"), diarrhea (started the past couple of days), nausea (occurs daily in the morning) and vomiting (Intermittent; contents: Undigested food and bile; denies hematemesis or coffee-ground emesis; takes Compazine p.r.n. rarely with improvement; past treatments:  Zofran 4 & 8 mg (both ineffective) and phenergan). Pertinent negatives include no fever (feels warm at times, but does not have fever), hematochezia, hematuria, melena or weight loss. Associated symptoms comments: Colonoscopy 05/09/23 - Patent end-to-end colo-colonic anastomosis, characterized by healthy appearing mucosa. The entire examined colon is normal. Biopsied; patho: Colonic mucosa with no significant histopathologic findings. The pain is aggravated by eating and palpation (certain foods). The pain is relieved by Nothing. She has tried oral narcotic analgesics and H2 blockers (Currently taking Bentyl 20 mg b.i.d., famotidine 40 mg daily, & oxycodone) for the symptoms. The treatment provided no relief. Prior diagnostic workup includes lower endoscopy, GI consult, ultrasound and CT scan. Her past medical history is significant for abdominal surgery " "(Hx of laparoscopic sigmoid colectomy for locally invasive sigmoid colon cancer), colon cancer, gallstones, GERD (Well-controlled taking famotidine 40 mg once daily) and pancreatitis (Patient also has a history of pancreatitis she reports may have been chemo induced). There is no history of irritable bowel syndrome, PUD or ulcerative colitis. Patient's medical history does not include kidney stones and UTI.     Review of Systems   Constitutional:  Positive for activity change and appetite change. Negative for chills, diaphoresis, fatigue, fever (feels warm at times, but does not have fever), unexpected weight change and weight loss.   HENT:  Negative for sore throat and trouble swallowing.    Respiratory:  Negative for cough, choking and shortness of breath.    Cardiovascular:  Negative for chest pain.   Gastrointestinal:  Positive for abdominal pain, constipation (hx of constipation, but currently having 1-2 BMs daily that are like pudding or water recently; has not taken Senokot or lactulose recently; currently taking probiotic daily; currently takes oxycodone p.r.n. daily for bladder pain"), diarrhea (started the past couple of days), nausea (occurs daily in the morning) and vomiting (Intermittent; contents: Undigested food and bile; denies hematemesis or coffee-ground emesis; takes Compazine p.r.n. rarely with improvement; past treatments:  Zofran 4 & 8 mg (both ineffective) and phenergan). Negative for abdominal distention, anal bleeding, blood in stool, hematochezia, melena and rectal pain.   Genitourinary:  Negative for hematuria.       Patient's last menstrual period was 01/22/2020.  Past Medical History:   Diagnosis Date    Anxiety     Depression     FH: ovarian cancer 03/16/2020    Hx of psychiatric care     Effexor, Paxil, Lexapro, Zoloft, Wellbutrin, Trazodone Buspar    Hypertension     Hyperthyroidism     Hypothyroid     Kidney calculi     Malignant neoplasm of sigmoid colon 03/16/2020    Menorrhagia  "    Multinodular goiter 2012    Palpitation     Psychiatric problem     Venous insufficiency     Vulvar lesion      Past Surgical History:   Procedure Laterality Date     SECTION, CLASSIC      x3    COLON SURGERY      COLONOSCOPY N/A 2020    Procedure: COLONOSCOPY;  Surgeon: Shane Parker MD;  Location: [facility];  Service: Endoscopy;  Laterality: N/A;    COLONOSCOPY N/A 2022    Procedure: COLONOSCOPY;  Surgeon: Shane Parker MD;  Location: [facility];  Service: Endoscopy;  Laterality: N/A;    COLONOSCOPY N/A 2023    Procedure: COLONOSCOPY;  Surgeon: Shane Parker MD;  Location: [facility];  Service: Endoscopy;  Laterality: N/A;  no cecum anastomosis    CYSTOSCOPY W/ URETERAL STENT PLACEMENT Bilateral 2020    Procedure: CYSTOSCOPY, WITH URETERAL STENT INSERTION;  Surgeon: Claudio Tyson MD;  Location: [address];  Service: Urology;  Laterality: Bilateral;    EXAMINATION UNDER ANESTHESIA N/A 2024    Procedure: Exam under anesthesia-vagina;  Surgeon: Eli Her MD;  Location: [facility] OR;  Service: OB/GYN;  Laterality: N/A;    EXCISION-WIDE LOCAL Left 2024    Procedure: EXCISION-WIDE LOCAL -  Labia Majora;  Surgeon: Eli Her MD;  Location: [facility] OR;  Service: OB/GYN;  Laterality: Left;  upper left side of labia majora    EXCISIONAL BIOPSY, LYMPH NODE  2022    abdominal x 4    INSERTION OF TUNNELED CENTRAL VENOUS CATHETER (CVC) WITH SUBCUTANEOUS PORT N/A 2020    Procedure: [id number]-K-[id number];  Surgeon: [facility] Diagnostic Provider;  [facility];  Service: Radiology;  Laterality: N/A;  Room 189/[facility]    LAPAROSCOPIC SIGMOIDECTOMY N/A 2020    Procedure: COLECTOMY, SIGMOID, LAPAROSCOPIC, flex sig, ERAS high;  Surgeon: Silvio Man MD;  Location: [facility] OR [address] [place] [address];  Service: Colon and Rectal;  Laterality: N/A;    MOBILIZATION OF SPLENIC FLEXURE  2020    Procedure: MOBILIZATION, SPLENIC FLEXURE;   Surgeon: Silvio Man MD;  Location: [facility] OR [address] [place] [address];  Service: Colon and Rectal;;    TONSILLECTOMY      TOTAL ABDOMINAL HYSTERECTOMY W/ BILATERAL SALPINGOOPHORECTOMY N/A 03/25/2020    Procedure: HYSTERECTOMY, TOTAL, ABDOMINAL, WITH BILATERAL SALPINGO-OOPHORECTOMY;  Surgeon: Keron Brady MD;  Location: [facility] OR [address] [place] [address];  Service: Oncology;  Laterality: N/A;     Family History   Problem Relation Age of Onset    Diabetes Mother 65    Heart disease Father     Drug abuse Brother     Drug abuse Brother     Cancer Maternal Aunt         lung cancer    Ovarian cancer Maternal Aunt     Ovarian cancer Maternal Aunt     Colon cancer Paternal Uncle     Cancer Paternal Uncle     Cancer Maternal Grandmother         stomach cancer- started in ovaries    Ovarian cancer Maternal Grandmother         stomach cancer- started in ovaries    No Known Problems Daughter     Drug abuse Son     Drug abuse Son         clean/sober since 2012    Ovarian cancer Cousin         mother had ovarian cancer    Uterine cancer Neg Hx     Breast cancer Neg Hx     Crohn's disease Neg Hx     Ulcerative colitis Neg Hx      Social History     Tobacco Use    Smoking status: Never    Smokeless tobacco: Never   Substance Use Topics    Alcohol use: Yes     Comment: occasionally- twice monthly    Drug use: Never     Wt Readings from Last 10 Encounters:   04/08/24 123.4 kg (272 lb 0.8 oz)   04/04/24 122.4 kg (269 lb 13.5 oz)   04/04/24 122.4 kg (269 lb 13.5 oz)   03/27/24 120.6 kg (265 lb 14 oz)   03/21/24 120.4 kg (265 lb 6.9 oz)   03/21/24 120.4 kg (265 lb 6.9 oz)   03/11/24 119.7 kg (264 lb)   03/07/24 121.6 kg (268 lb)   03/07/24 121.6 kg (268 lb)   02/19/24 124.6 kg (274 lb 11.1 oz)     Lab Results   Component Value Date    WBC 1.80 (LL) 04/04/2024    HGB 12.3 04/04/2024    HCT 37.4 04/04/2024    MCV 83 04/04/2024     04/04/2024     CMP  Sodium   Date Value Ref Range Status   04/04/2024 144 136 - 145 mmol/L Final     Potassium   Date Value  Ref Range Status   04/04/2024 3.1 (L) 3.5 - 5.1 mmol/L Final     Chloride   Date Value Ref Range Status   04/04/2024 110 95 - 110 mmol/L Final     CO2   Date Value Ref Range Status   04/04/2024 22 (L) 23 - 29 mmol/L Final     Glucose   Date Value Ref Range Status   04/04/2024 105 70 - 110 mg/dL Final     BUN   Date Value Ref Range Status   04/04/2024 14 6 - 20 mg/dL Final     Creatinine   Date Value Ref Range Status   04/04/2024 2.0 (H) 0.5 - 1.4 mg/dL Final     Calcium   Date Value Ref Range Status   04/04/2024 9.9 8.7 - 10.5 mg/dL Final     Total Protein   Date Value Ref Range Status   04/04/2024 6.9 6.0 - 8.4 g/dL Final     Albumin   Date Value Ref Range Status   04/04/2024 3.4 (L) 3.5 - 5.2 g/dL Final     Total Bilirubin   Date Value Ref Range Status   04/04/2024 0.6 0.1 - 1.0 mg/dL Final     Comment:     For infants and newborns, interpretation of results should be based  on gestational age, weight and in agreement with clinical  observations.    Premature Infant recommended reference ranges:  Up to 24 hours.............<8.0 mg/dL  Up to 48 hours............<12.0 mg/dL  3-5 days..................<15.0 mg/dL  6-29 days.................<15.0 mg/dL       Alkaline Phosphatase   Date Value Ref Range Status   04/04/2024 159 (H) 55 - 135 U/L Final     AST   Date Value Ref Range Status   04/04/2024 15 10 - 40 U/L Final     ALT   Date Value Ref Range Status   04/04/2024 7 (L) 10 - 44 U/L Final     Anion Gap   Date Value Ref Range Status   04/04/2024 12 8 - 16 mmol/L Final     eGFR if    Date Value Ref Range Status   06/15/2022 >60 >60 mL/min/1.73 m^2 Final   06/15/2022 >60 >60 mL/min/1.73 m^2 Final     eGFR if non    Date Value Ref Range Status   06/15/2022 >60 >60 mL/min/1.73 m^2 Final     Comment:     Calculation used to obtain the estimated glomerular filtration  rate (eGFR) is the CKD-EPI equation.      06/15/2022 >60 >60 mL/min/1.73 m^2 Final     Comment:     Calculation used to  obtain the estimated glomerular filtration  rate (eGFR) is the CKD-EPI equation.        Lab Results   Component Value Date    LIPASE 160 (H) 02/08/2024     Lab Results   Component Value Date    LIPASERES 159 03/11/2024     Lab Results   Component Value Date    TSH 3.170 01/27/2024     Reviewed prior medical records including radiology report of abdominal ultrasound 03/11/2024, CT abdomen and pelvis 03/11/2024, abdominal ultrasound 02/14/2024, MRCP 01/27/2024, CT abdomen and pelvis 01/27/2024 & endoscopy history (see surgical history).    Objective:      Physical Exam  Vitals and nursing note reviewed.   Constitutional:       General: She is not in acute distress.     Appearance: Normal appearance. She is obese. She is not ill-appearing.   HENT:      Mouth/Throat:      Lips: Pink. No lesions.   Cardiovascular:      Rate and Rhythm: Normal rate.      Pulses: Normal pulses.      Heart sounds: Normal heart sounds.   Pulmonary:      Effort: Pulmonary effort is normal. No respiratory distress.      Breath sounds: Normal breath sounds.   Abdominal:      General: Abdomen is protuberant. A surgical scar is present. Bowel sounds are normal. There is no distension or abdominal bruit. There are no signs of injury.      Palpations: Abdomen is soft. There is no shifting dullness, fluid wave, hepatomegaly, splenomegaly or mass.      Tenderness: There is abdominal tenderness in the epigastric area. There is no guarding or rebound. Negative signs include Govea's sign, Rovsing's sign and McBurney's sign.   Skin:     General: Skin is warm and dry.      Coloration: Skin is not jaundiced or pale.   Neurological:      Mental Status: She is alert and oriented to person, place, and time.   Psychiatric:         Attention and Perception: Attention normal.         Mood and Affect: Mood normal.         Speech: Speech normal.         Behavior: Behavior normal.         Assessment:       1. Epigastric pain    2. Nausea and vomiting, unspecified  vomiting type    3. History of pancreatitis    4. Gallstones    5. Belching    6. Gastroesophageal reflux disease, unspecified whether esophagitis present    7. Abdominal bloating    8. Alternating constipation and diarrhea    9. History of colon cancer        Plan:       Epigastric pain  - schedule EGD, discussed procedure with patient, including risks and benefits, patient verbalized understanding  -     NM Hepatobiliary (HIDA) W Pharm and EF When Performed; Future; Expected date: 04/08/2024  - REFILL: dicyclomine (BENTYL) 20 mg tablet; Take 1 tablet (20 mg total) by mouth 2 (two) times daily as needed (abdominal pain).  Dispense: 180 tablet; Refill: 0  - START:  sucralfate (CARAFATE) 1 gram tablet; Take 1 tablet (1 g total) by mouth 4 (four) times daily. for 10 days  Dispense: 40 tablet; Refill: 0  -     Lipase; Future; Expected date: 04/08/2024  -     NM Gastric Emptying; Future; Expected date: 04/08/2024  - continue famotidine 40 mg once daily    Nausea and vomiting, unspecified vomiting type  - schedule EGD, discussed procedure with patient, including risks and benefits, patient verbalized understanding  - Recommend small frequent meals instead of 3 big meals a day, low fat meals & low residue diet.  -     Lipase; Future; Expected date: 04/08/2024  -     NM Gastric Emptying; Future; Expected date: 04/08/2024  -     NM Hepatobiliary (HIDA) W Pharm and EF When Performed; Future; Expected date: 04/08/2024  - continue Compazine as prescribed for now    History of pancreatitis  - consider EUS  -     Lipase; Future; Expected date: 04/08/2024  - Common causes are gallstones; drinking too much beer, wine, and mixed drinks (alcohol); drugs; high blood calcium levels; high blood triglycerides; and infection.  - Avoid smoking and drinking alcohol-containing beverages, including beer, wine, and mixed drinks.  -Stay away from sugars and fats. Limit sweets and fatty foods such as desserts, fried foods, and chips. Eat good  fats found in fish, nuts, avocados, and oils, like olive oil and canola oil. Cut back on solid fats like butter, lard, and margarine  Drink 6 to 8 glasses of water each day.  Report to ED if symptoms develop of:   Signs of infection. These include a fever of 100.4°F (38°C) or higher, chills.  Very bad belly pain  Very bad upset stomach and throwing up  Skin becomes yellow    Gallstones  -     NM Hepatobiliary (HIDA) W Pharm and EF When Performed; Future; Expected date: 04/08/2024  - recommend low fat diet  - discussed diagnosis & that it can cause symptoms in some patients, while other patients with it can be asymptomatic; recommend continue recommendations as discussed during our visit and if symptoms persist after other testing has been completed, recommend seeing general surgery for further evaluation and management    Belching & Gastroesophageal reflux disease, unspecified whether esophagitis present  - schedule EGD, discussed procedure with patient, including risks and benefits, patient verbalized understanding  -Avoid large meals, avoid eating within 2-3 hours of bedtime (avoid late night eating & lying down soon after eating), elevate head of bed if nocturnal symptoms are present, smoking cessation (if current smoker), & weight loss (if overweight).   -Avoid known foods which trigger reflux symptoms & to minimize/avoid high-fat foods, chocolate, caffeine, citrus, alcohol, & tomato products.  -Avoid/limit use of NSAID's, since they can cause GI upset, bleeding, and/or ulcers. If needed, take with food.  - continue famotidine 40 mg once daily    Alternating constipation and diarrhea  - recommend OTC probiotic, such as Florastor or Culturelle, taken as directed on packaging  - avoid lactose, alcohol, & caffeine  - avoid known triggers  - Recommended increase fiber in diet, especially soluble fiber since this can help bulk up the stool consistency and may help to slow down how fast the stool goes through the  colon and can prevent diarrhea  -     Giardia / Cryptosporidum, EIA; Future; Expected date: 04/08/2024  -     Stool Exam-Ova,Cysts,Parasites; Future; Expected date: 04/08/2024  -     Clostridium difficile EIA; Future; Expected date: 04/08/2024  -     Stool culture; Future; Expected date: 04/08/2024  - REFILL: dicyclomine (BENTYL) 20 mg tablet; Take 1 tablet (20 mg total) by mouth 2 (two) times daily as needed (abdominal pain).  Dispense: 180 tablet; Refill: 0  - consider repeat colonoscopy    History of colon cancer  - Follow-up for surveillance colonoscopy 05/2026    Follow up in about 4 weeks (around 5/6/2024), or if symptoms worsen or fail to improve.      If no improvement in symptoms or symptoms worsen, call/follow-up at clinic or go to ER.        Total time spent on the encounter includes face to face time and non-face to face time preparing to see the patient (eg, review of tests), Obtaining and/or reviewing separately obtained history, Documenting clinical information in the electronic or other health record, Independently interpreting results (not separately reported) and communicating results to the patient/family/caregiver, or Care coordination (not separately reported).     A dictation software program was used for this note. Please expect some simple typographical  errors in this note.

## 2024-04-10 NOTE — TELEPHONE ENCOUNTER
----- Message from Ekta Mata MA sent at 4/9/2024  9:35 AM CDT -----  Please contact pt to schedule 60 minute follow up with lAejandro. Pt has new referral to him but already established.

## 2024-04-10 NOTE — PROGRESS NOTES
PET Imaging Questionnaire    Are you a Diabetic? Recent Blood Sugar level? No    Are you anemic? Bone Marrow Stimulation Meds? Yes    Have you had a CT Scan, if so when & where was your last one? Yes -     Have you had a PET Scan, if so when & where was your last one? Yes -     Chemotherapy or currently on Chemotherapy? Yes    Radiation therapy? No    Surgical History:   Past Surgical History:   Procedure Laterality Date     SECTION, CLASSIC      x3    COLON SURGERY      COLONOSCOPY N/A 2020    Procedure: COLONOSCOPY;  Surgeon: Shane Parker MD;  Location: [facility];  Service: Endoscopy;  Laterality: N/A;    COLONOSCOPY N/A 2022    Procedure: COLONOSCOPY;  Surgeon: Shane Parker MD;  Location: [facility];  Service: Endoscopy;  Laterality: N/A;    COLONOSCOPY N/A 2023    Procedure: COLONOSCOPY;  Surgeon: Shane Parker MD;  Location: [facility];  Service: Endoscopy;  Laterality: N/A;  no cecum anastomosis    CYSTOSCOPY W/ URETERAL STENT PLACEMENT Bilateral 2020    Procedure: CYSTOSCOPY, WITH URETERAL STENT INSERTION;  Surgeon: Claudio Tyson MD;  Location: [facility] OR [address] [place] [address];  Service: Urology;  Laterality: Bilateral;    EXAMINATION UNDER ANESTHESIA N/A 2024    Procedure: Exam under anesthesia-vagina;  Surgeon: Eli Her MD;  Location: [facility] OR;  Service: OB/GYN;  Laterality: N/A;    EXCISION-WIDE LOCAL Left 2024    Procedure: EXCISION-WIDE LOCAL -  Labia Majora;  Surgeon: Eli Her MD;  Location: [facility] OR;  Service: OB/GYN;  Laterality: Left;  upper left side of labia majora    EXCISIONAL BIOPSY, LYMPH NODE  2022    abdominal x 4    INSERTION OF TUNNELED CENTRAL VENOUS CATHETER (CVC) WITH SUBCUTANEOUS PORT N/A 2020    Procedure: [id number];  Surgeon: [facility] Diagnostic Provider;  [facility];  Service: Radiology;  Laterality: N/A;  Room [facility]    LAPAROSCOPIC SIGMOIDECTOMY N/A 2020    Procedure:  COLECTOMY, SIGMOID, LAPAROSCOPIC, flex sig, ERAS high;  Surgeon: Silvio Man MD;  [facility];  Service: Colon and Rectal;  Laterality: N/A;    MOBILIZATION OF SPLENIC FLEXURE  03/25/2020    Procedure: MOBILIZATION, SPLENIC FLEXURE;  Surgeon: Silvio Man MD;  [facility];  Service: Colon and Rectal;;    TONSILLECTOMY      TOTAL ABDOMINAL HYSTERECTOMY W/ BILATERAL SALPINGOOPHORECTOMY N/A 03/25/2020    Procedure: HYSTERECTOMY, TOTAL, ABDOMINAL, WITH BILATERAL SALPINGO-OOPHORECTOMY;  Surgeon: Keron Brady MD;  Location: [facility] OR [address] [place] [address];  Service: Oncology;  Laterality: N/A;        Have you been fasting for at least 6 hours? Yes    Is there any chance you may be pregnant or breastfeeding? No    Assay: 12.3 MCi@:7.30   Injection Site: lt ac     Residual: 2.0 mCi@: 7.32   Technologist: Ale Box Injected:10.3mCi

## 2024-04-11 NOTE — TELEPHONE ENCOUNTER
Dr moses, would you feel comfortable reviewing her CT results with her, as she is very anxious.  ~Hien

## 2024-04-15 NOTE — TELEPHONE ENCOUNTER
Pt c/o L extremity pain , leg swelling , painful to walk . She states that current pain medication is not working . She also states that she is going on a cruise soon would like to know if there is any medication that can be given . I instructed pt to go to the ER if symptoms continue to get worse . Advised pt I will return her call as soon as I have talked to MD . She verbalized understanding .

## 2024-04-16 NOTE — TELEPHONE ENCOUNTER
I think it is because of disease progression, I will have to verify with her when I see her later this week to see when she started Lonsurf it is either mid February or mid March,   My plan was to start on Fruquintinib and review that with her if we feel like that she is disease progressing more than what the scan is revealing

## 2024-04-18 NOTE — PROGRESS NOTES
Subjective     Patient ID: Gail Mckinnon is a 54 y.o. female.    Chief Complaint: Colon Cancer    Malignant neoplasm of sigmoid colon   3/16/2020 Initial Diagnosis     Malignant neoplasm of sigmoid colon      3/31/2020 Cancer Staged     Staging form: Colon and Rectum, AJCC 8th Edition  - Clinical stage from 3/31/2020: Stage IIIC (cT4b, cN2a, cM0)      5/6/2020 - 11/17/2020 Chemotherapy     Treatment Summary   Plan Name: OP FOLFOX 6 Q2W  Treatment Goal: Curative  Status: Inactive  Start Date: 5/6/2020  End Date: 11/6/2020  Provider: Dylan Leyva MD  Chemotherapy: fluorouraciL injection 945 mg, 400 mg/m2 = 945 mg, Intravenous, Clinic/[facility] 1 time, 14 of 14 cycles  Administration: 945 mg (5/6/2020), 945 mg (5/20/2020), 945 mg (6/3/2020), 945 mg (6/17/2020), 945 mg (7/29/2020), 945 mg (8/12/2020), 945 mg (8/26/2020), 945 mg (9/9/2020), 945 mg (9/23/2020), 945 mg (10/6/2020), 945 mg (10/21/2020), 945 mg (11/4/2020)  fluorouraciL 2,400 mg/m2 = 5,665 mg in sodium chloride 0.9% 240 mL chemo infusion, 2,400 mg/m2 = 5,665 mg, Intravenous, Over 46 hours, 14 of 14 cycles  Administration: 5,665 mg (5/6/2020), 5,665 mg (5/20/2020), 5,665 mg (6/3/2020), 5,665 mg (6/17/2020), 5,665 mg (7/29/2020), 5,665 mg (8/12/2020), 5,665 mg (8/26/2020), 5,665 mg (9/9/2020), 5,665 mg (9/23/2020), 5,665 mg (10/6/2020), 5,665 mg (10/21/2020), 5,665 mg (11/4/2020)  leucovorin calcium 900 mg in dextrose 5 % 250 mL infusion, 945 mg, Intravenous, Clinic/[facility] 1 time, 14 of 14 cycles  Administration: 900 mg (5/6/2020), 900 mg (5/20/2020), 900 mg (6/3/2020), 900 mg (6/17/2020), 945 mg (6/30/2020), 945 mg (7/20/2020), 945 mg (7/29/2020), 945 mg (8/12/2020), 945 mg (8/26/2020), 945 mg (9/9/2020), 945 mg (9/23/2020), 945 mg (10/6/2020), 945 mg (10/21/2020), 945 mg (11/4/2020)  oxaliplatin (ELOXATIN) 200 mg in dextrose 5 % 500 mL chemo infusion, 201 mg, Intravenous, Clinic/[facility] 1 time, 6 of 6 cycles  Dose modification: 65 mg/m2 (original dose 85  mg/m2, Cycle 6)  Administration: 200 mg (5/6/2020), 200 mg (5/20/2020), 200 mg (6/3/2020), 200 mg (6/17/2020), 201 mg (6/30/2020), 150 mg (7/20/2020)      5/3/2021 Cancer Staged     Staging form: Colon and Rectum, AJCC 8th Edition  - Clinical stage from 5/3/2021: Stage Unknown (rcT0, cNX, cM1)      6/16/2021 - 8/2/2023 Chemotherapy     Treatment Summary   Plan Name: OP COLORECTAL FOLFIRI + BEVACIZUMAB Q2W  Treatment Goal: Control  Status: Inactive  Start Date: 6/16/2021  End Date: 8/2/2023  Provider: Marah Santo MD  Chemotherapy: fluorouraciL injection 990 mg, 400 mg/m2 = 990 mg, Intravenous, [facility] 1 time, 15 of 15 cycles  Administration: 990 mg (6/16/2021), 990 mg (6/30/2021), 990 mg (7/14/2021), 980 mg (7/28/2021), 990 mg (8/11/2021), 990 mg (8/25/2021), 990 mg (9/8/2021), 990 mg (9/22/2021), 990 mg (10/6/2021), 990 mg (10/20/2021), 990 mg (11/3/2021), 990 mg (12/1/2021), 990 mg (12/15/2021), 990 mg (11/17/2021), 990 mg (12/28/2021)  fluorouraciL 2,400 mg/m2 = 5,950 mg in sodium chloride 0.9% 240 mL chemo infusion, 2,400 mg/m2 = 5,950 mg, Intravenous, Over 46 hours, 43 of 46 cycles  Administration: 5,950 mg (6/16/2021), 5,950 mg (6/30/2021), 5,950 mg (7/14/2021), 5,880 mg (7/28/2021), 5,930 mg (8/11/2021), 5,930 mg (8/25/2021), 5,930 mg (9/8/2021), 5,930 mg (9/22/2021), 5,930 mg (10/6/2021), 5,930 mg (10/20/2021), 5,930 mg (11/3/2021), 5,930 mg (12/1/2021), 5,930 mg (12/15/2021), 5,930 mg (11/17/2021), 5,930 mg (12/28/2021), 6,050 mg (5/11/2022), 6,025 mg (3/9/2022), 6,025 mg (2/23/2022), 5,930 mg (2/2/2022), 5,930 mg (1/19/2022), 6,050 mg (4/20/2022), 6,025 mg (4/6/2022), 6,025 mg (3/23/2022), 6,050 mg (6/1/2022), 6,050 mg (6/15/2022), 6,050 mg (10/19/2022), 6,050 mg (10/5/2022), 6,050 mg (11/2/2022), 6,050 mg (11/16/2022), 6,050 mg (11/30/2022), 6,050 mg (1/4/2023), 6,050 mg (12/19/2022), 6,050 mg (1/18/2023), 6,000 mg (5/22/2023), 6,000 mg (4/26/2023), 6,000 mg (4/11/2023), 6,000 mg (2/28/2023), 6,050  mg (2/14/2023), 6,000 mg (2/1/2023), 6,000 mg (7/5/2023), 6,000 mg (6/19/2023), 6,000 mg (6/5/2023), 6,000 mg (7/31/2023)  bevacizumab (AVASTIN) 600 mg in sodium chloride 0.9% 100 mL chemo infusion, 660 mg, Intravenous, [facility] 1 time, 36 of 36 cycles  Dose modification: 5 mg/kg (original dose 5 mg/kg, Cycle 26, Reason: MD Discretion, Comment: 5 mg/kg original dose)  Administration: 600 mg (6/30/2021), 600 mg (7/14/2021), 600 mg (7/28/2021), 600 mg (6/16/2021), 600 mg (8/11/2021), 655 mg (8/25/2021), 600 mg (9/8/2021), 600 mg (9/22/2021), 600 mg (10/6/2021), 600 mg (10/20/2021), 600 mg (11/3/2021), 655 mg (12/1/2021), 600 mg (12/15/2021), 600 mg (11/17/2021), 600 mg (12/28/2021), 600 mg (5/11/2022), 600 mg (3/9/2022), 600 mg (2/23/2022), 600 mg (2/2/2022), 600 mg (1/19/2022), 600 mg (4/20/2022), 680 mg (4/6/2022), 600 mg (3/23/2022), 680 mg (10/5/2022), 680 mg (10/19/2022), 680 mg (11/2/2022), 680 mg (11/16/2022), 680 mg (11/30/2022), 680 mg (1/4/2023), 680 mg (12/19/2022), 680 mg (1/18/2023), 680 mg (3/28/2023), 680 mg (3/14/2023), 680 mg (2/28/2023), 680 mg (2/14/2023), 680 mg (2/1/2023)  irinotecan (CAMPTOSAR) 440 mg in sodium chloride 0.9% 500 mL chemo infusion, 446 mg, Intravenous, [facility] 1 time, 45 of 48 cycles  Dose modification: 180 mg/m2 (original dose 180 mg/m2, Cycle 24)  Administration: 440 mg (6/16/2021), 440 mg (6/30/2021), 440 mg (7/14/2021), 440 mg (7/28/2021), 440 mg (8/11/2021), 444 mg (8/25/2021), 440 mg (9/8/2021), 440 mg (9/22/2021), 440 mg (10/6/2021), 440 mg (10/20/2021), 440 mg (11/3/2021), 440 mg (12/1/2021), 440 mg (12/15/2021), 440 mg (11/17/2021), 440 mg (12/28/2021), 440 mg (5/11/2022), 440 mg (3/9/2022), 440 mg (2/23/2022), 440 mg (2/2/2022), 440 mg (1/19/2022), 440 mg (4/20/2022), 440 mg (4/6/2022), 440 mg (3/24/2022), 440 mg (6/1/2022), 440 mg (6/15/2022), 440 mg (10/19/2022), 440 mg (10/5/2022), 440 mg (11/2/2022), 440 mg (11/16/2022), 440 mg (11/30/2022), 440 mg (1/4/2023),  440 mg (12/19/2022), 440 mg (1/18/2023), 440 mg (5/22/2023), 440 mg (4/26/2023), 440 mg (4/11/2023), 440 mg (3/28/2023), 440 mg (3/14/2023), 440 mg (2/28/2023), 440 mg (2/14/2023), 440 mg (2/1/2023), 440 mg (7/5/2023), 440 mg (6/19/2023), 440 mg (6/5/2023), 440 mg (7/31/2023)  bevacizumab-awwb (MVASI) 5 mg/kg = 680 mg in sodium chloride 0.9% 100 mL infusion, 5 mg/kg = 680 mg (100 % of original dose 5 mg/kg), Intravenous, Clinic/[facility] 1 time, 7 of 10 cycles  Dose modification: 5 mg/kg (original dose 5 mg/kg, Cycle 39)  Administration: 680 mg (4/11/2023), 680 mg (4/26/2023), 680 mg (5/22/2023), 680 mg (7/5/2023), 680 mg (6/19/2023), 680 mg (6/5/2023), 680 mg (7/31/2023)      8/17/2023 - 8/18/2023 Chemotherapy     Treatment Summary   Plan Name: OP CETUXIMAB 500MG Q2W  Treatment Goal: Control  Status: Inactive  Start Date:   End Date:   Provider: Marah Santo MD  Chemotherapy: [No matching medication found in this treatment plan]      8/24/2023 - 12/29/2023 Chemotherapy     Treatment Summary   Plan Name: [facility] - ARM 1 ENCORAFENIB  CETUXIMAB NIVOLUMAB  Treatment Goal: Palliative  Status: Inactive  Start Date: 8/24/2023  End Date: 12/29/2023  Provider: Marah Santo MD  Chemotherapy: cetuximab (ERBITUX) 100 mg/50 mL chemo infusion 1,200 mg, 1,230 mg, Intravenous, Clinic/[facility] 1 time, 5 of 12 cycles  Administration: 1,200 mg (8/24/2023), 1,200 mg (9/8/2023), 1,200 mg (9/22/2023), 1,200 mg (10/6/2023), 1,200 mg (10/20/2023), 1,200 mg (11/3/2023), 1,200 mg (11/17/2023), 1,200 mg (12/1/2023), 1,200 mg (12/15/2023), 1,200 mg (12/29/2023)  encorafenib 75 mg Cap, 300 mg, Oral, Daily, 1 of 1 cycle, Start date: --, End date: --      2/19/2024 -  Chemotherapy     Treatment Summary   Plan Name: OP BEVACIZUMAB Q4W  Treatment Goal: Control  Status: Active  Start Date: 2/19/2024 (Planned)  End Date: 1/6/2025 (Planned)  Provider: Marah Santo MD  Chemotherapy: bevacizumab-awwb (MVASI) 5 mg/kg = 625 mg in sodium chloride 0.9%  "100 mL infusion, 5 mg/kg = 625 mg (100 % of original dose 5 mg/kg), Intravenous, Clinic/[facility] 1 time, 0 of 12 cycles  Dose modification: 5 mg/kg (original dose 5 mg/kg, Cycle 1, Reason: Other (see comments), Comment: Given with Lonsurf)         PET scan in 12/2023: Progression of disease with multiple new and enlarging lymph nodes throughout the pelvis and mesentery along with new metastatic FDG avid mediastinal adenopathy as above.      CT chest, abdomen/pelvis on 1/4/2024: There is an increased but small non water density pericardial effusion. Mediastinal and hilar lymph nodes are largely stable as are multiple pulmonary nodules.. While the recist index lesions have not significantly changed in size there is increasing mesenteric and extraperitoneal adenopathy when compared to December 4, 2023.  There is also increasing 4th duodenum and proximal jejunal wall thickening and adjacent inflammation"     SUMMARY:  [id number] poorly differentiated adenocarcinoma, MSI stable, B Aádn mutation positive   Currently stage IV   She completed adjuvant chemotherapy, 4 cycles of FOLFOX and the remainder infusional 5 FU, completed in November 2020. Oxaliplatin was stopped due to severe adverse reaction, initially noted itching, then ear closing.   Follow-up CTs and PET in May 2021 showed enlarging retroperitoneal node, concerning for metastatic disease.    She started FOLFIRI + Avastin continued till July 2022.   Given isolated RP toni disease, she underwent open Left periaortic and aortocaval lymph node dissection on 7/12/2022 at [facility]. Resumed FOLFIRI+ Debo with relatively stable disease but noted disease progression on scans in August 2023.     She enrolled into  SWOG 2107 (encorafenib/cetuximab + nivo) , initial scans showed improvement, second scans showed some new lesions, now growing after a short term repeat.  As per Recist calculations, the target lesions are stable. However, there is  POD in the left paracolic gutter, seen on " "PET scan as well. She was taken off trial until 12/2023 .      She then started on next line of SOC treatment with Lonsurf and Avastin. Treatment delayed due to recent hospitalization for pancreatitis, likely immune therapy related, improving on steroids. Completed taper. Started Lonsurf on 2/12/2024    HPIPET scan from 4/10/2024 reveals  "Mixed response to therapy. Interval resolution of the majority of the sites of uptake in mediastinal and hilar lymph nodes. Development of new or increasing activity in left supraclavicular nodes, internal mammary and epicardial nodes, left inguinal nodes, the left iliopsoas muscle. Decreased size of metastatic celiac node within increasing FDG uptake. Similar levels of activity in mesenteric and left pelvic sidewall nodes."  She notes left groin pain and swelling  Ultrasound negative for clots   Review of Systems   Constitutional:  Negative for appetite change and unexpected weight change.   HENT:  Negative for mouth sores.    Eyes:  Negative for visual disturbance.   Respiratory:  Negative for cough and shortness of breath.    Cardiovascular:  Negative for chest pain.   Gastrointestinal:  Negative for abdominal pain and diarrhea.   Genitourinary:  Negative for frequency.   Musculoskeletal:  Negative for back pain.   Integumentary:  Negative for rash.   Neurological:  Negative for headaches.   Hematological:  Positive for adenopathy.   Psychiatric/Behavioral:  The patient is nervous/anxious.           Objective     Physical Exam  Vitals reviewed.   Constitutional:       Appearance: She is well-developed.   HENT:      Mouth/Throat:      Pharynx: No oropharyngeal exudate.   Cardiovascular:      Rate and Rhythm: Normal rate.      Heart sounds: Normal heart sounds.   Pulmonary:      Effort: Pulmonary effort is normal.      Breath sounds: Normal breath sounds. No wheezing.   Abdominal:      General: Bowel sounds are normal.      Palpations: Abdomen is soft.      Tenderness: There is " no abdominal tenderness.   Musculoskeletal:         General: No tenderness.      Right lower leg: No edema.      Left lower leg: Edema present.   Lymphadenopathy:      Cervical: No cervical adenopathy.   Skin:     General: Skin is warm and dry.      Findings: No rash.      Comments: Puckered appearance of skin on a\bdomen    Neurological:      Mental Status: She is alert and oriented to person, place, and time.      Coordination: Coordination normal.   Psychiatric:         Thought Content: Thought content normal.         Judgment: Judgment normal.              LABS:  WBC   Date Value Ref Range Status   04/18/2024 3.79 (L) 3.90 - 12.70 K/uL Final     Hemoglobin   Date Value Ref Range Status   04/18/2024 12.8 12.0 - 16.0 g/dL Final     Hematocrit   Date Value Ref Range Status   04/18/2024 39.0 37.0 - 48.5 % Final     Platelets   Date Value Ref Range Status   04/18/2024 292 150 - 450 K/uL Final     Gran # (ANC)   Date Value Ref Range Status   04/18/2024 2.2 1.8 - 7.7 K/uL Final     Gran %   Date Value Ref Range Status   04/18/2024 57.2 38.0 - 73.0 % Final       Chemistry        Component Value Date/Time     04/18/2024 1004    K 3.5 04/18/2024 1004     (H) 04/18/2024 1004    CO2 18 (L) 04/18/2024 1004    BUN 19 04/18/2024 1004    CREATININE 2.4 (H) 04/18/2024 1004     (H) 04/18/2024 1004        Component Value Date/Time    CALCIUM 9.7 04/18/2024 1004    ALKPHOS 133 04/18/2024 1004    AST 12 04/18/2024 1004    ALT 10 04/18/2024 1004    BILITOT 0.3 04/18/2024 1004    ESTGFRAFRICA >60 06/15/2022 1238    ESTGFRAFRICA >60 06/15/2022 1238    EGFRNONAA >60 06/15/2022 1238    EGFRNONAA >60 06/15/2022 1238          Assessment and Plan     1. 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 Secondary adenocarcinoma of lymph node    4. Secondary liver cancer    5. Secondary malignant neoplasm of retroperitoneum  Overview:  Formatting of this note might be different from the original.  Added automatically from request for surgery [id number]      6. Acute renal failure, unspecified acute renal failure type        Route Chart for Scheduling    Med Onc Chart Routing      Follow up with physician . Sent to RN to schedule with me once new chemo approved   Follow up with [facility]    Infusion scheduling note    Injection scheduling note    Labs    Imaging    Pharmacy appointment    Other referrals                  Mrs. Mckinnon comes in to review her CT scans which reveal disease progression  Her left leg swelling is related to disease progression in the inguinal LN,  Poor prognosis reviewed, e scribed Fruquintinib to specialty pharmacy. Side effects were reviewed and hand outs provided on medication  MARILOU offered IV fluids she did not want to do this today and she notes that she will return next week to receive IV fluids and her scheduled IV iron.    I will plan on seeing her once her new chemotherapy drug gets approved with CBC CMP and UA prior    Visit today included increased complexity associated with the care of the episodic problem chemotherapy addressed and managing the longitudinal care of the patient due to the serious and/or complex managed problem(s)  colon cancer       Above care plan was reviewed with the patient and her accompanying wife and all questions were answered to their satisfaction

## 2024-04-18 NOTE — Clinical Note
Sent Fruquintinib to Specialty pharmacy. Once approved she needs to see me with CBC, CMP, UA prior to starting I did not send this to

## 2024-04-18 NOTE — PROGRESS NOTES
PSYCHO-ONCOLOGY NOTE/ Individual Psychotherapy     Date: 4/18/2024   Site:  [place], [place]      Therapeutic Intervention: Met with patient.  Outpatient - Supportive psychotherapy 30 min - CPT Code 65491    This includes face to face time and non-face to face time preparing to see the patient, obtaining and/or reviewing separately obtained history, documenting clinical information in the electronic or other health record, independently interpreting results and communicating results to the patient/family/caregiver, or care coordinator.    The patient location is: home ([place], [place])  The chief complaint leading to consultation is: follow up therapy    Visit type: audiovisual    Face to Face time with patient: 34 minutes  50 minutes of total time spent on the encounter, which includes face to face time and non-face to face time preparing to see the patient (eg, review of tests), Obtaining and/or reviewing separately obtained history, Documenting clinical information in the electronic or other health record, Independently interpreting results (not separately reported) and communicating results to the patient/family/caregiver, or Care coordination (not separately reported).         Each patient to whom he or she provides medical services by telemedicine is:  (1) informed of the relationship between the physician and patient and the respective role of any other health care provider with respect to management of the patient; and (2) notified that he or she may decline to receive medical services by telemedicine and may withdraw from such care at any time.        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    Anxiety    Insomnia    Insomnia    Other constipation    Nausea and vomiting    Mucositis due to chemotherapy    Morbid obesity    Secondary adenocarcinoma of lymph node    Thyroid nodule    Hypercalcemia    Transaminitis    Hypophosphatemia    Functional diarrhea    Primary hypertension    Diarrhea of presumed infectious origin    Calculus of gallbladder without cholecystitis without obstruction    ACP (advance care planning)    Abdominal pain    Shortness of breath    Secondary malignant neoplasm of retroperitoneum    Depressive disorder    Hypothyroidism    Immunodeficiency due to chemotherapy    Acneiform rash    Chronic midline low back pain without sciatica    Chemotherapy-induced fatigue    Vulval lesion    Iron deficiency anemia due to chronic blood loss    Acute pancreatitis    Anxiety and depression    Hypoglycemia    Secondary liver cancer    Mesenteric adenitis    Hypokalemia    Dehydration    Acute kidney failure    Left leg pain       Chief complaint/reason for encounter: depression   Met with patient to evaluate psychosocial adaptation to diagnosis/treatment/survivorship of metastatic colon cancer     Current Medications  Current Outpatient Medications   Medication Sig Dispense Refill    acetaminophen (TYLENOL) 500 MG tablet Take 2 tablets (1,000 mg total) by mouth every 8 (eight) hours. 60 tablet 0    buPROPion (WELLBUTRIN SR) 150 MG TBSR 12 hr tablet Take 1 tablet (150 mg total) by mouth 2 (two) times daily. 180 tablet 0    cinacalcet (SENSIPAR) 30 MG Tab Take 1 tablet (30 mg total) by mouth daily with breakfast. 30 tablet 11    dicyclomine (BENTYL) 20 mg tablet Take 1 tablet (20 mg total) by mouth 2 (two) times daily as needed (abdominal pain). 180 tablet 0    famotidine (PEPCID) 40 MG tablet Take 1 tablet (40 mg total) by mouth once daily. 30 tablet 11    gabapentin (NEURONTIN) 300 MG capsule Take 1 capsule (300 mg total) by mouth every evening. 30 capsule 0     Lactobacillus rhamnosus GG (CULTURELLE) 10 billion cell capsule Take 1 capsule by mouth once daily.      lactulose (CHRONULAC) 10 gram/15 mL solution Take 15 mLs (10 g total) by mouth every 6 (six) hours as needed (constipation). 150 mL 1    levothyroxine (SYNTHROID) 175 MCG tablet Take 2 tablets (350 mcg total) by mouth before breakfast. 60 tablet 11    LIDOcaine-prilocaine (EMLA) cream Apply topically as needed (30 to 60 min prior chemo or port use). 30 g 3    LORazepam (ATIVAN) 1 MG tablet Take 1 tablet (1 mg total) by mouth every 12 (twelve) hours as needed for Anxiety (nausea). 30 tablet 0    magic mouthwash diphen/antac/lidoc/nysta Swish and swallow 10 ml by mouth 4 times daily 500 mL 6    mirtazapine (REMERON) 7.5 MG Tab Take 1 tablet (7.5 mg total) by mouth every evening. (Patient taking differently: Take 7.5 mg by mouth nightly as needed.) 30 tablet 0    multivitamin (THERAGRAN) per tablet Take 1 tablet by mouth once daily.      olmesartan (BENICAR) 20 MG tablet Take 1 tablet (20 mg total) by mouth once daily. 30 tablet 5    ondansetron (ZOFRAN-ODT) 4 MG TbDL Take 1 tablet (4 mg total) by mouth every 8 (eight) hours as needed (Nausea). 20 tablet 0    ondansetron (ZOFRAN-ODT) 8 MG TbDL Take 1 tablet (8 mg total) by mouth every 8 (eight) hours as needed. 60 tablet 3    oxyCODONE (ROXICODONE) 5 MG immediate release tablet Take 1 tablet (5 mg total) by mouth every 4 (four) hours as needed for Pain (Abdominal pain). 8 tablet 0    potassium chloride (MICRO-K) 10 MEQ CpSR Take 4 capsules (40 mEq total) by mouth once daily. 120 capsule 3    prochlorperazine (COMPAZINE) 10 MG tablet Take 1 tablet (10 mg total) by mouth every 6 (six) hours as needed. 20 tablet 0    senna (SENOKOT) 8.6 mg tablet Take 2 tablets by mouth once daily. 60 tablet 2    sucralfate (CARAFATE) 1 gram tablet Take 1 tablet (1 g total) by mouth 4 (four) times daily. for 10 days 40 tablet 0    trifluridine-tipiraciL (LONSURF) 20-8.19 mg Tab Take  4 tablets by mouth 2 times daily on days 1 to 5 and days 8 to 12 of a 28-day cycle. 80 tablet 3     No current facility-administered medications for this visit.     Facility-Administered Medications Ordered in Other Visits   Medication Dose Route Frequency Provider Last Rate Last Admin    ondansetron injection 4 mg  4 mg Intravenous Daily PRN Volpi-Abadie, Jacqueline, MD        sodium chloride 0.9% flush 3 mL  3 mL Intravenous PRN Volpi-Abadie, Jacqueline, MD           ONCOLOGY HISTORY  Oncology History   Malignant neoplasm of sigmoid colon   3/16/2020 Initial Diagnosis    Malignant neoplasm of sigmoid colon     3/31/2020 Cancer Staged    Staging form: Colon and Rectum, AJCC 8th Edition  - Clinical stage from 3/31/2020: Stage IIIC (cT4b, cN2a, cM0)     5/6/2020 - 11/17/2020 Chemotherapy    Treatment Summary   Plan Name: OP FOLFOX 6 Q2W  Treatment Goal: Curative  Status: Inactive  Start Date: 5/6/2020  End Date: 11/6/2020  Provider: Dylan Leyva MD  Chemotherapy: fluorouraciL injection 945 mg, 400 mg/m2 = 945 mg, Intravenous, [facility] 1 time, 14 of 14 cycles  Administration: 945 mg (5/6/2020), 945 mg (5/20/2020), 945 mg (6/3/2020), 945 mg (6/17/2020), 945 mg (7/29/2020), 945 mg (8/12/2020), 945 mg (8/26/2020), 945 mg (9/9/2020), 945 mg (9/23/2020), 945 mg (10/6/2020), 945 mg (10/21/2020), 945 mg (11/4/2020)  fluorouraciL 2,400 mg/m2 = 5,665 mg in sodium chloride 0.9% 240 mL chemo infusion, 2,400 mg/m2 = 5,665 mg, Intravenous, Over 46 hours, 14 of 14 cycles  Administration: 5,665 mg (5/6/2020), 5,665 mg (5/20/2020), 5,665 mg (6/3/2020), 5,665 mg (6/17/2020), 5,665 mg (7/29/2020), 5,665 mg (8/12/2020), 5,665 mg (8/26/2020), 5,665 mg (9/9/2020), 5,665 mg (9/23/2020), 5,665 mg (10/6/2020), 5,665 mg (10/21/2020), 5,665 mg (11/4/2020)  leucovorin calcium 900 mg in dextrose 5 % 250 mL infusion, 945 mg, Intravenous, [facility] 1 time, 14 of 14 cycles  Administration: 900 mg (5/6/2020), 900 mg (5/20/2020), 900 mg  (6/3/2020), 900 mg (6/17/2020), 945 mg (6/30/2020), 945 mg (7/20/2020), 945 mg (7/29/2020), 945 mg (8/12/2020), 945 mg (8/26/2020), 945 mg (9/9/2020), 945 mg (9/23/2020), 945 mg (10/6/2020), 945 mg (10/21/2020), 945 mg (11/4/2020)  oxaliplatin (ELOXATIN) 200 mg in dextrose 5 % 500 mL chemo infusion, 201 mg, Intravenous, [facility] 1 time, 6 of 6 cycles  Dose modification: 65 mg/m2 (original dose 85 mg/m2, Cycle 6)  Administration: 200 mg (5/6/2020), 200 mg (5/20/2020), 200 mg (6/3/2020), 200 mg (6/17/2020), 201 mg (6/30/2020), 150 mg (7/20/2020)     5/3/2021 Cancer Staged    Staging form: Colon and Rectum, AJCC 8th Edition  - Clinical stage from 5/3/2021: Stage Unknown (rcT0, cNX, cM1)     6/16/2021 - 8/2/2023 Chemotherapy    Treatment Summary   Plan Name: OP COLORECTAL FOLFIRI + BEVACIZUMAB Q2W  Treatment Goal: Control  Status: Inactive  Start Date: 6/16/2021  End Date: 8/2/2023  Provider: Marah Santo MD  Chemotherapy: fluorouraciL injection 990 mg, 400 mg/m2 = 990 mg, Intravenous, [facility] 1 time, 15 of 15 cycles  Administration: 990 mg (6/16/2021), 990 mg (6/30/2021), 990 mg (7/14/2021), 980 mg (7/28/2021), 990 mg (8/11/2021), 990 mg (8/25/2021), 990 mg (9/8/2021), 990 mg (9/22/2021), 990 mg (10/6/2021), 990 mg (10/20/2021), 990 mg (11/3/2021), 990 mg (12/1/2021), 990 mg (12/15/2021), 990 mg (11/17/2021), 990 mg (12/28/2021)  fluorouraciL 2,400 mg/m2 = 5,950 mg in sodium chloride 0.9% 240 mL chemo infusion, 2,400 mg/m2 = 5,950 mg, Intravenous, Over 46 hours, 43 of 46 cycles  Administration: 5,950 mg (6/16/2021), 5,950 mg (6/30/2021), 5,950 mg (7/14/2021), 5,880 mg (7/28/2021), 5,930 mg (8/11/2021), 5,930 mg (8/25/2021), 5,930 mg (9/8/2021), 5,930 mg (9/22/2021), 5,930 mg (10/6/2021), 5,930 mg (10/20/2021), 5,930 mg (11/3/2021), 5,930 mg (12/1/2021), 5,930 mg (12/15/2021), 5,930 mg (11/17/2021), 5,930 mg (12/28/2021), 6,050 mg (5/11/2022), 6,025 mg (3/9/2022), 6,025 mg (2/23/2022), 5,930 mg (2/2/2022),  5,930 mg (1/19/2022), 6,050 mg (4/20/2022), 6,025 mg (4/6/2022), 6,025 mg (3/23/2022), 6,050 mg (6/1/2022), 6,050 mg (6/15/2022), 6,050 mg (10/19/2022), 6,050 mg (10/5/2022), 6,050 mg (11/2/2022), 6,050 mg (11/16/2022), 6,050 mg (11/30/2022), 6,050 mg (1/4/2023), 6,050 mg (12/19/2022), 6,050 mg (1/18/2023), 6,000 mg (5/22/2023), 6,000 mg (4/26/2023), 6,000 mg (4/11/2023), 6,000 mg (2/28/2023), 6,050 mg (2/14/2023), 6,000 mg (2/1/2023), 6,000 mg (7/5/2023), 6,000 mg (6/19/2023), 6,000 mg (6/5/2023), 6,000 mg (7/31/2023)  bevacizumab (AVASTIN) 600 mg in sodium chloride 0.9% 100 mL chemo infusion, 660 mg, Intravenous, [place] [id number] time, 36 of 36 cycles  Dose modification: 5 mg/kg (original dose 5 mg/kg, Cycle 26, Reason: MD Discretion, Comment: 5 mg/kg original dose)  Administration: 600 mg (6/30/2021), 600 mg (7/14/2021), 600 mg (7/28/2021), 600 mg (6/16/2021), 600 mg (8/11/2021), 655 mg (8/25/2021), 600 mg (9/8/2021), 600 mg (9/22/2021), 600 mg (10/6/2021), 600 mg (10/20/2021), 600 mg (11/3/2021), 655 mg (12/1/2021), 600 mg (12/15/2021), 600 mg (11/17/2021), 600 mg (12/28/2021), 600 mg (5/11/2022), 600 mg (3/9/2022), 600 mg (2/23/2022), 600 mg (2/2/2022), 600 mg (1/19/2022), 600 mg (4/20/2022), 680 mg (4/6/2022), 600 mg (3/23/2022), 680 mg (10/5/2022), 680 mg (10/19/2022), 680 mg (11/2/2022), 680 mg (11/16/2022), 680 mg (11/30/2022), 680 mg (1/4/2023), 680 mg (12/19/2022), 680 mg (1/18/2023), 680 mg (3/28/2023), 680 mg (3/14/2023), 680 mg (2/28/2023), 680 mg (2/14/2023), 680 mg (2/1/2023)  irinotecan (CAMPTOSAR) 440 mg in sodium chloride 0.9% 500 mL chemo infusion, 446 mg, Intravenous, [facility] 1 time, 45 of 48 cycles  Dose modification: 180 mg/m2 (original dose 180 mg/m2, Cycle 24)  Administration: 440 mg (6/16/2021), 440 mg (6/30/2021), 440 mg (7/14/2021), 440 mg (7/28/2021), 440 mg (8/11/2021), 444 mg (8/25/2021), 440 mg (9/8/2021), 440 mg (9/22/2021), 440 mg (10/6/2021), 440 mg (10/20/2021), 440 mg (11/3/2021),  440 mg (12/1/2021), 440 mg (12/15/2021), 440 mg (11/17/2021), 440 mg (12/28/2021), 440 mg (5/11/2022), 440 mg (3/9/2022), 440 mg (2/23/2022), 440 mg (2/2/2022), 440 mg (1/19/2022), 440 mg (4/20/2022), 440 mg (4/6/2022), 440 mg (3/24/2022), 440 mg (6/1/2022), 440 mg (6/15/2022), 440 mg (10/19/2022), 440 mg (10/5/2022), 440 mg (11/2/2022), 440 mg (11/16/2022), 440 mg (11/30/2022), 440 mg (1/4/2023), 440 mg (12/19/2022), 440 mg (1/18/2023), 440 mg (5/22/2023), 440 mg (4/26/2023), 440 mg (4/11/2023), 440 mg (3/28/2023), 440 mg (3/14/2023), 440 mg (2/28/2023), 440 mg (2/14/2023), 440 mg (2/1/2023), 440 mg (7/5/2023), 440 mg (6/19/2023), 440 mg (6/5/2023), 440 mg (7/31/2023)  bevacizumab-awwb (MVASI) 5 mg/kg = 680 mg in sodium chloride 0.9% 100 mL infusion, 5 mg/kg = 680 mg (100 % of original dose 5 mg/kg), Intravenous, [facility] 1 time, 7 of 10 cycles  Dose modification: 5 mg/kg (original dose 5 mg/kg, Cycle 39)  Administration: 680 mg (4/11/2023), 680 mg (4/26/2023), 680 mg (5/22/2023), 680 mg (7/5/2023), 680 mg (6/19/2023), 680 mg (6/5/2023), 680 mg (7/31/2023)     8/17/2023 - 8/18/2023 Chemotherapy    Treatment Summary   Plan Name: OP CETUXIMAB 500MG Q2W  Treatment Goal: Control  Status: Inactive  Start Date:   End Date:   Provider: Marah Santo MD  Chemotherapy: [No matching medication found in this treatment plan]     8/24/2023 - 12/29/2023 Chemotherapy    Treatment Summary   Plan Name: [facility] - ARM 1 ENCORAFENIB  CETUXIMAB NIVOLUMAB  Treatment Goal: Palliative  Status: Inactive  Start Date: 8/24/2023  End Date: 12/29/2023  Provider: Marah Santo MD  Chemotherapy: cetuximab (ERBITUX) 100 mg/50 mL chemo infusion 1,200 mg, 1,230 mg, Intravenous, Clinic[facility] 1 time, 5 of 12 cycles  Administration: 1,200 mg (8/24/2023), 1,200 mg (9/8/2023), 1,200 mg (9/22/2023), 1,200 mg (10/6/2023), 1,200 mg (10/20/2023), 1,200 mg (11/3/2023), 1,200 mg (11/17/2023), 1,200 mg (12/1/2023), 1,200 mg (12/15/2023), 1,200 mg  (12/29/2023)  encorafenib 75 mg Cap, 300 mg, Oral, Daily, 1 of 1 cycle, Start date: --, End date: --     2/19/2024 -  Chemotherapy    Treatment Summary   Plan Name: OP BEVACIZUMAB Q4W  Treatment Goal: Control  Status: Active  Start Date: 2/19/2024  End Date: 1/9/2025 (Planned)  Provider: Marah Santo MD  Chemotherapy: bevacizumab-awwb (MVASI) 600 mg in sodium chloride 0.9% 100 mL infusion, 625 mg (100 % of original dose 5 mg/kg), Intravenous, [facility] 1 time, 2 of 12 cycles  Dose modification: 5 mg/kg (original dose 5 mg/kg, Cycle 1, Reason: Other (see comments), Comment: Given with L[place]f)  Administration: 600 mg (2/19/2024), 600 mg (3/7/2024), 600 mg (3/21/2024)     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   4/3/2020 Initial Diagnosis    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     5/6/2020 - 11/17/2020 Chemotherapy    Treatment Summary   Plan Name: OP FOLFOX 6 Q2W  Treatment Goal: Curative  Status: Inactive  Start Date: 5/6/2020  End Date: 11/6/2020  Provider: Dylan Leyva MD  Chemotherapy: fluorouraciL injection 945 mg, 400 mg/m2 = 945 mg, Intravenous, [facility] 1 time, 14 of 14 cycles  Administration: 945 mg (5/6/2020), 945 mg (5/20/2020), 945 mg (6/3/2020), 945 mg (6/17/2020), 945 mg (7/29/2020), 945 mg (8/12/2020), 945 mg (8/26/2020), 945 mg (9/9/2020), 945 mg (9/23/2020), 945 mg (10/6/2020), 945 mg (10/21/2020), 945 mg (11/4/2020)  fluorouraciL 2,400 mg/m2 = 5,665 mg in sodium chloride 0.9% 240 mL chemo infusion, 2,400 mg/m2 = 5,665 mg, Intravenous, Over 46 hours, 14 of 14 cycles  Administration: 5,665 mg (5/6/2020), 5,665 mg (5/20/2020), 5,665 mg (6/3/2020), 5,665 mg (6/17/2020), 5,665 mg (7/29/2020), 5,665 mg (8/12/2020), 5,665 mg (8/26/2020), 5,665 mg (9/9/2020), 5,665 mg (9/23/2020), 5,665 mg (10/6/2020), 5,665 mg (10/21/2020), 5,665 mg (11/4/2020)  leucovorin calcium 900 mg in dextrose 5 % 250 mL infusion, 945 mg, Intravenous, Clinic[facility] 1 time, 14 of 14 cycles  Administration: 900 mg (5/6/2020), 900  mg (5/20/2020), 900 mg (6/3/2020), 900 mg (6/17/2020), 945 mg (6/30/2020), 945 mg (7/20/2020), 945 mg (7/29/2020), 945 mg (8/12/2020), 945 mg (8/26/2020), 945 mg (9/9/2020), 945 mg (9/23/2020), 945 mg (10/6/2020), 945 mg (10/21/2020), 945 mg (11/4/2020)  oxaliplatin (ELOXATIN) 200 mg in dextrose 5 % 500 mL chemo infusion, 201 mg, Intravenous, [facility] 1 time, 6 of 6 cycles  Dose modification: 65 mg/m2 (original dose 85 mg/m2, Cycle 6)  Administration: 200 mg (5/6/2020), 200 mg (5/20/2020), 200 mg (6/3/2020), 200 mg (6/17/2020), 201 mg (6/30/2020), 150 mg (7/20/2020)     6/16/2021 - 8/2/2023 Chemotherapy    Treatment Summary   Plan Name: OP COLORECTAL FOLFIRI + BEVACIZUMAB Q2W  Treatment Goal: Control  Status: Inactive  Start Date: 6/16/2021  End Date: 8/2/2023  Provider: Marah Santo MD  Chemotherapy: fluorouraciL injection 990 mg, 400 mg/m2 = 990 mg, Intravenous, [facility] 1 time, 15 of 15 cycles  Administration: 990 mg (6/16/2021), 990 mg (6/30/2021), 990 mg (7/14/2021), 980 mg (7/28/2021), 990 mg (8/11/2021), 990 mg (8/25/2021), 990 mg (9/8/2021), 990 mg (9/22/2021), 990 mg (10/6/2021), 990 mg (10/20/2021), 990 mg (11/3/2021), 990 mg (12/1/2021), 990 mg (12/15/2021), 990 mg (11/17/2021), 990 mg (12/28/2021)  fluorouraciL 2,400 mg/m2 = 5,950 mg in sodium chloride 0.9% 240 mL chemo infusion, 2,400 mg/m2 = 5,950 mg, Intravenous, Over 46 hours, 43 of 46 cycles  Administration: 5,950 mg (6/16/2021), 5,950 mg (6/30/2021), 5,950 mg (7/14/2021), 5,880 mg (7/28/2021), 5,930 mg (8/11/2021), 5,930 mg (8/25/2021), 5,930 mg (9/8/2021), 5,930 mg (9/22/2021), 5,930 mg (10/6/2021), 5,930 mg (10/20/2021), 5,930 mg (11/3/2021), 5,930 mg (12/1/2021), 5,930 mg (12/15/2021), 5,930 mg (11/17/2021), 5,930 mg (12/28/2021), 6,050 mg (5/11/2022), 6,025 mg (3/9/2022), 6,025 mg (2/23/2022), 5,930 mg (2/2/2022), 5,930 mg (1/19/2022), 6,050 mg (4/20/2022), 6,025 mg (4/6/2022), 6,025 mg (3/23/2022), 6,050 mg (6/1/2022), 6,050 mg  (6/15/2022), 6,050 mg (10/19/2022), 6,050 mg (10/5/2022), 6,050 mg (11/2/2022), 6,050 mg (11/16/2022), 6,050 mg (11/30/2022), 6,050 mg (1/4/2023), 6,050 mg (12/19/2022), 6,050 mg (1/18/2023), 6,000 mg (5/22/2023), 6,000 mg (4/26/2023), 6,000 mg (4/11/2023), 6,000 mg (2/28/2023), 6,050 mg (2/14/2023), 6,000 mg (2/1/2023), 6,000 mg (7/5/2023), 6,000 mg (6/19/2023), 6,000 mg (6/5/2023), 6,000 mg (7/31/2023)  bevacizumab (AVASTIN) 600 mg in sodium chloride 0.9% 100 mL chemo infusion, 660 mg, Intravenous, Clinic/[facility] 1 time, 36 of 36 cycles  Dose modification: 5 mg/kg (original dose 5 mg/kg, Cycle 26, Reason: MD Discretion, Comment: 5 mg/kg original dose)  Administration: 600 mg (6/30/2021), 600 mg (7/14/2021), 600 mg (7/28/2021), 600 mg (6/16/2021), 600 mg (8/11/2021), 655 mg (8/25/2021), 600 mg (9/8/2021), 600 mg (9/22/2021), 600 mg (10/6/2021), 600 mg (10/20/2021), 600 mg (11/3/2021), 655 mg (12/1/2021), 600 mg (12/15/2021), 600 mg (11/17/2021), 600 mg (12/28/2021), 600 mg (5/11/2022), 600 mg (3/9/2022), 600 mg (2/23/2022), 600 mg (2/2/2022), 600 mg (1/19/2022), 600 mg (4/20/2022), 680 mg (4/6/2022), 600 mg (3/23/2022), 680 mg (10/5/2022), 680 mg (10/19/2022), 680 mg (11/2/2022), 680 mg (11/16/2022), 680 mg (11/30/2022), 680 mg (1/4/2023), 680 mg (12/19/2022), 680 mg (1/18/2023), 680 mg (3/28/2023), 680 mg (3/14/2023), 680 mg (2/28/2023), 680 mg (2/14/2023), 680 mg (2/1/2023)  irinotecan (CAMPTOSAR) 440 mg in sodium chloride 0.9% 500 mL chemo infusion, 446 mg, Intravenous, [facility] 1 time, 45 of 48 cycles  Dose modification: 180 mg/m2 (original dose 180 mg/m2, Cycle 24)  Administration: 440 mg (6/16/2021), 440 mg (6/30/2021), 440 mg (7/14/2021), 440 mg (7/28/2021), 440 mg (8/11/2021), 444 mg (8/25/2021), 440 mg (9/8/2021), 440 mg (9/22/2021), 440 mg (10/6/2021), 440 mg (10/20/2021), 440 mg (11/3/2021), 440 mg (12/1/2021), 440 mg (12/15/2021), 440 mg (11/17/2021), 440 mg (12/28/2021), 440 mg (5/11/2022), 440 mg  (3/9/2022), 440 mg (2/23/2022), 440 mg (2/2/2022), 440 mg (1/19/2022), 440 mg (4/20/2022), 440 mg (4/6/2022), 440 mg (3/24/2022), 440 mg (6/1/2022), 440 mg (6/15/2022), 440 mg (10/19/2022), 440 mg (10/5/2022), 440 mg (11/2/2022), 440 mg (11/16/2022), 440 mg (11/30/2022), 440 mg (1/4/2023), 440 mg (12/19/2022), 440 mg (1/18/2023), 440 mg (5/22/2023), 440 mg (4/26/2023), 440 mg (4/11/2023), 440 mg (3/28/2023), 440 mg (3/14/2023), 440 mg (2/28/2023), 440 mg (2/14/2023), 440 mg (2/1/2023), 440 mg (7/5/2023), 440 mg (6/19/2023), 440 mg (6/5/2023), 440 mg (7/31/2023)  bevacizumab-awwb (MVASI) 5 mg/kg = 680 mg in sodium chloride 0.9% 100 mL infusion, 5 mg/kg = 680 mg (100 % of original dose 5 mg/kg), Intravenous, [facility] 1 time, 7 of 10 cycles  Dose modification: 5 mg/kg (original dose 5 mg/kg, Cycle 39)  Administration: 680 mg (4/11/2023), 680 mg (4/26/2023), 680 mg (5/22/2023), 680 mg (7/5/2023), 680 mg (6/19/2023), 680 mg (6/5/2023), 680 mg (7/31/2023)     8/17/2023 - 8/18/2023 Chemotherapy    Treatment Summary   Plan Name: OP CETUXIMAB 500MG Q2W  Treatment Goal: Control  Status: Inactive  Start Date:   End Date:   Provider: Marah Santo MD  Chemotherapy: [No matching medication found in this treatment plan]     8/24/2023 - 12/29/2023 Chemotherapy    Treatment Summary   Plan Name: [facility] - ARM 1 ENCORAFENIB  CETUXIMAB NIVOLUMAB  Treatment Goal: Palliative  Status: Inactive  Start Date: 8/24/2023  End Date: 12/29/2023  Provider: Marah Santo MD  Chemotherapy: cetuximab (ERBITUX) 100 mg/50 mL chemo infusion 1,200 mg, 1,230 mg, Intravenous, Clinic/[facility] 1 time, 5 of 12 cycles  Administration: 1,200 mg (8/24/2023), 1,200 mg (9/8/2023), 1,200 mg (9/22/2023), 1,200 mg (10/6/2023), 1,200 mg (10/20/2023), 1,200 mg (11/3/2023), 1,200 mg (11/17/2023), 1,200 mg (12/1/2023), 1,200 mg (12/15/2023), 1,200 mg (12/29/2023)  encorafenib 75 mg Cap, 300 mg, Oral, Daily, 1 of 1 cycle, Start date: --, End date: --     2/19/2024 -   Chemotherapy    Treatment Summary   Plan Name: OP BEVACIZUMAB Q4W  Treatment Goal: Control  Status: Active  Start Date: 2/19/2024  End Date: 1/9/2025 (Planned)  Provider: Marah Santo MD  Chemotherapy: bevacizumab-awwb (MVASI) 600 mg in sodium chloride 0.9% 100 mL infusion, 625 mg (100 % of original dose 5 mg/kg), Intravenous, Clinic/[facility] 1 time, 2 of 12 cycles  Dose modification: 5 mg/kg (original dose 5 mg/kg, Cycle 1, Reason: Other (see comments), Comment: Given with [place])  Administration: 600 mg (2/19/2024), 600 mg (3/7/2024), 600 mg (3/21/2024)         Objective:  Gail Mckinnon arrived promptly for the session. The patient was fully cooperative throughout the session.  Appearance: age appropriate, appropriately  dressed, adequately  groomed  Behavior/Cooperation: friendly and cooperative  Speech: normal in rate, volume, and tone  Mood: dysthymic  Affect: mood congruent  Thought Process: goal-directed, logical  Thought Content: normal,  No delusions or paranoia; did not appear to be responding to internal stimuli during the session  Orientation: grossly intact  Memory: grossly intact  Attention Span/Concentration: Attends to session without distraction; reports no difficulty  Fund of Knowledge: average  Estimate of Intelligence: average from verbal skills and history  Cognition: grossly intact  Insight: patient has awareness of illness; good insight into own behavior and behavior of others  Judgment: the patient's behavior is adequate to circumstances    NCCN Distress thermometer:       4/15/2024     3:40 PM 4/5/2024    11:56 AM 3/21/2024     8:24 AM 3/6/2024     3:50 PM 2/19/2024    12:21 PM 2/6/2024     9:34 AM 1/25/2024     2:30 PM   DISTRESS SCREENING   Distress Score 6 6 0 - No Distress 4 6 5 6   Practical Concerns Finances Finances;Treatment decisions  Finances;Treatment decisions Housing;Finances;Treatment decisions Treatment decisions Finances;Treatment decisions   Social Concerns None of  these None of these  None of these Relationship with children None of these None of these   Emotional Concerns Worry or anxiety;Sadness or depression;Loss of interest or enjoyment;Fear;Anger Worry or anxiety;Sadness or depression;Anger  Worry or anxiety;Sadness or depression;Fear;Loneliness;Anger Worry or anxiety;Sadness or depression;Anger None of these Worry or anxiety;Sadness or depression;Loss of interest or enjoyment;Fear   Spiritual or Yazidism Concerns None of these None of these  Death, dying, or afterlife None of these None of these None of these   Physical Concerns Pain;Fatigue Pain Pain Fatigue;Memory or concentration;Changes in eating Pain;Fatigue None of these Pain;Fatigue        Interval history and content of current session: Ms. Mckinnon is experiencing leg pain that is interfering with her ability to walk. She discussed her disappointment with the lack of a resolution for her pain. Her emotions were normalized and she was encouraged to process them. Ms. Mckinnon' also discussed her current relationship with her treatment team and the potential for a second opinion. She still plans to go on a cruise next week despite her pain. She was encouraged to think of adjustments she can make on her trip to mange her pain while enjoying her time (taking breaks, using a scooter).      Risk parameters:   Patient reports no suicidal ideation  Patient reports no homicidal ideation  Patient reports no self-injurious behavior  Patient reports no violent behavior   Safety needs:  None at this time      Verbal deficits: None     Patient's response to intervention:The patient's response to intervention is accepting.     Progress toward goals and other mental status changes:  The patient's progress toward goals is fair .      Progress to date:Progress - Ongoing, but Slow      Goals from last visit: Attempted, partially met        Patient Strengths: verbal, intelligent, successful, good social support, good insight,  commitment to wellness, strong miah      Treatment Plan:individual psychotherapy  Target symptoms: depression  Why chosen therapy is appropriate versus another modality: evidence based practice  Outcome monitoring methods: self-report  Therapeutic intervention type: supportive psychotherapy  Prognosis: Good      Behavioral goals:    Exercise: pace her activities   Social engagement: continue to rely on her social support system   Therapy: make adjustments on her trip that help to reduce her pain and allow her to have fun    Return to clinic: 2 weeks     Length of Service (minutes direct face-to-face contact): 34 minutes    Diagnosis:     ICD-10-CM ICD-9-CM   1. Adjustment disorder with mixed anxiety and depressed mood  F43.23 309.28                     Myla Gallagher, PhD  Clinical Health Psychology Fellow

## 2024-04-18 NOTE — PATIENT INSTRUCTIONS
Brand Names: US  Fruzaqla    What is this drug used for?  It is used to treat colorectal cancer.    What do I need to tell my doctor BEFORE I take this drug?  If you are allergic to this drug; any part of this drug; or any other drugs, foods, or substances. Tell your doctor about the allergy and what signs you had.  If you have high blood pressure.  If you have an infection.  If you have liver disease or raised liver enzymes.  If you take any drugs (prescription or OTC, natural products, vitamins) that must not be taken with this drug. This includes rifampin, Napoleons wort, or certain drugs used for seizures. There are many drugs that must not be taken with this drug.  If you are breast-feeding. Do not breast-feed while you take this drug and for 2 weeks after your last dose.  This is not a list of all drugs or health problems that interact with this drug.  Tell your doctor and pharmacist about all of your drugs (prescription or OTC, natural products, vitamins) and health problems. You must check to make sure that it is safe for you to take this drug with all of your drugs and health problems. Do not start, stop, or change the dose of any drug without checking with your doctor.    What are some things I need to know or do while I take this drug?  Tell all of your health care providers that you take this drug. This includes your doctors, nurses, pharmacists, and dentists.  Have your blood work and other lab tests checked as you have been told by your doctor.  High blood pressure has happened with this drug. Have your blood pressure checked as you have been told by your doctor.  High cholesterol and triglyceride levels have happened with this drug. If you have high cholesterol or triglycerides, talk with your doctor.  If you are allergic to tartrazine (FD&C Yellow No. 5) or sunset yellow FCF (FD&C Yellow No. 6), talk with your doctor. Some products have tartrazine or sunset yellow FCF.  You may bleed more easily. Be  careful and avoid injury. Use a soft toothbrush and an electric razor. Rarely, some bleeding problems have been deadly.  Call your doctor right away if you have any signs of bleeding problems, like bruising; black, tarry, or bloody stools; bleeding gums; blood in the urine; coughing up blood; cuts that take a long time to stop bleeding; feel dizzy; feeling very tired or weak; nosebleeds; pain or swelling; throwing up blood or throw up that looks like coffee grounds; or very bad headache.  You may have more of a chance of getting an infection. Some infections have been severe or deadly. Wash hands often. Stay away from people with infections, colds, or flu.  Call your doctor right away if you have any signs of infection like fever, chills, flu-like signs, very bad sore throat, ear or sinus pain, cough, more sputum or change in color of sputum, pain with passing urine, mouth sores, or a wound that will not heal.  If you have high blood sugar (diabetes), talk with your doctor. This drug may raise blood sugar.  Check your blood sugar as you have been told by your doctor.  Tell your doctor if you have signs of high blood sugar like confusion, feeling sleepy, unusual thirst or hunger, passing urine more often, flushing, fast breathing, or breath that smells like fruit.  It is common to have protein in the urine with this drug. Sometimes, this can be severe. Call your doctor right away if you have to pass urine more than normal or if you have swelling of the face, arms, or legs.  Tears in the stomach or bowel wall have happened with this drug. These can be severe and may lead to death. Call your doctor right away if you have severe stomach pain or upset stomach; stomach pain that does not go away; throwing up blood or throw up that looks like coffee grounds; black, tarry, or bloody stools; or fever or chills.  This drug may cause harm to an unborn baby. A pregnancy test may be done before you start this drug to show that  you are NOT pregnant.  If you or your sex partner may become pregnant, you must use birth control while taking this drug and for some time after the last dose. Ask your doctor how long to use birth control. If you or your sex partner gets pregnant, call your doctor right away.    What are some side effects that I need to call my doctor about right away?  WARNING/CAUTION: Even though it may be rare, some people may have very bad and sometimes deadly side effects when taking a drug. Tell your doctor or get medical help right away if you have any of the following signs or symptoms that may be related to a very bad side effect:  Signs of an allergic reaction, like rash; hives; itching; red, swollen, blistered, or peeling skin with or without fever; wheezing; tightness in the chest or throat; trouble breathing, swallowing, or talking; unusual hoarseness; or swelling of the mouth, face, lips, tongue, or throat.  Signs of high blood pressure like very bad headache or dizziness, passing out, or change in eyesight.  Signs of liver problems like dark urine, tiredness, decreased appetite, upset stomach or stomach pain, light-colored stools, throwing up, or yellow skin or eyes.  Signs of low thyroid levels like constipation; not able to handle cold; memory problems; mood changes; or a burning, numbness, or tingling feeling that is not normal.  Signs of electrolyte problems like mood changes; confusion; muscle pain, cramps, or spasms; weakness; shakiness; change in balance; an abnormal heartbeat; seizures; loss of appetite; or severe upset stomach or throwing up.  Dizziness or passing out.  Redness or irritation of the palms of hands or soles of feet.  The chance of heart attack or stroke due to blood clots may be raised. Call your doctor right away if you have signs of heart attack like chest pain that may spread to the arms, neck, jaw, back, or stomach; abnormal sweating; or feeling sick or throwing up. Call your doctor right  away if you have signs of stroke like weakness on 1 side of the body; eyesight, speech, or balance problems; drooping on 1 side of the face; feeling confused; or severe headache.  A severe brain problem called posterior reversible encephalopathy syndrome (PRES) may happen with this drug. This can be deadly. Call your doctor right away if you have signs like confusion, lowered alertness, change in eyesight, loss of eyesight, seizures, or severe headache.    What are some other side effects of this drug?  All drugs may cause side effects. However, many people have no side effects or only have minor side effects. Call your doctor or get medical help if any of these side effects or any other side effects bother you or do not go away:  Change in voice.  Stomach pain or diarrhea.  Decreased appetite.  Feeling tired or weak.  Mouth irritation or mouth sores.  Back, muscle, or joint pain.  These are not all of the side effects that may occur. If you have questions about side effects, call your doctor. Call your doctor for medical advice about side effects.  You may report side effects to your national health agency.    How is this drug best taken?  Use this drug as ordered by your doctor. Read all information given to you. Follow all instructions closely.  Take with or without food.  Take this drug at the same time of day.  Swallow whole. Do not chew, break, open, or crush.  If you throw up after taking a dose, do not repeat the dose. Take your next dose at your normal time.  How this drug is taken may change based on blood work results, side effects, and how well the drug is working.  Do not change the dose or stop this drug without talking with the doctor.  This drug may affect how wounds heal. If you need to have surgery, you may need to stop this drug before surgery. Start taking it again after surgery as you have been told by your doctor. Call your doctor right away if you have a wound that does not heal or any other  wound problems.    What do I do if I miss a dose?  Take a missed dose as soon as you think about it.  If it has been more than 12 hours since the missed dose, skip the missed dose and go back to your normal time.  Do not take 2 doses on the same day.    How do I store and/or throw out this drug?  Store at room temperature in a dry place. Do not store in a bathroom.  Keep all drugs in a safe place. Keep all drugs out of the reach of children and pets.  Throw away unused or  drugs. Do not flush down a toilet or pour down a drain unless you are told to do so. Check with your pharmacist if you have questions about the best way to throw out drugs. There may be drug take-back programs in your area.    General drug facts  If your symptoms or health problems do not get better or if they become worse, call your doctor.  Do not share your drugs with others and do not take anyone else's drugs.  Some drugs may have another patient information leaflet. If you have any questions about this drug, please talk with your doctor, nurse, pharmacist, or other health care provider.  If you think there has been an overdose, call your poison control center or get medical care right away. Be ready to tell or show what was taken, how much, and when it happened.    Last Reviewed Date  2023-11-15  Consumer Information Use and Disclaimer  This generalized information is a limited summary of diagnosis, treatment, and/or medication information. It is not meant to be comprehensive and should be used as a tool to help the user understand and/or assess potential diagnostic and treatment options. It does NOT include all information about conditions, treatments, medications, side effects, or risks that may apply to a specific patient. It is not intended to be medical advice or a substitute for the medical advice, diagnosis, or treatment of a health care provider based on the health care provider's examination and assessment of a patient's  specific and unique circumstances. Patients must speak with a health care provider for complete information about their health, medical questions, and treatment options, including any risks or benefits regarding use of medications. This information does not endorse any treatments or medications as safe, effective, or approved for treating a specific patient. UpToDate, Inc. and its affiliates disclaim any warranty or liability relating to this information or the use thereof. The use of this information is governed by the Terms of Use, available at https://www.[employer].com/en/know/clinical-effectiveness-terms.    © 2024 UpToDate, Inc. and its affiliates and/or licensors. All rights reserved.  Use of [employer] is subject to the Terms of Use.  Topic 405034 Version 10.0

## 2024-04-19 NOTE — Clinical Note
Diagnosis: MARILOU (acute kidney injury) [[id number]]   Future Attending Provider: ISIAH ELLIOTT [[id number]]

## 2024-04-20 PROBLEM — Z51.5 PALLIATIVE CARE ENCOUNTER: Status: ACTIVE | Noted: 2024-01-01

## 2024-04-20 PROBLEM — M25.559 HIP PAIN: Status: ACTIVE | Noted: 2024-01-01

## 2024-04-20 PROBLEM — N17.9 AKI (ACUTE KIDNEY INJURY): Status: ACTIVE | Noted: 2024-01-01

## 2024-04-20 NOTE — SUBJECTIVE & OBJECTIVE
Past Medical History:   Diagnosis Date    Anxiety     Depression     FH: ovarian cancer 2020    Hx of psychiatric care     Effexor, Paxil, Lexapro, Zoloft, Wellbutrin, Trazodone Buspar    Hypertension     Hyperthyroidism     Hypothyroid     Kidney calculi     Malignant neoplasm of sigmoid colon 2020    Menorrhagia     Multinodular goiter 2012    Palpitation     Psychiatric problem     Venous insufficiency     Vulvar lesion        Past Surgical History:   Procedure Laterality Date     SECTION, CLASSIC      x3    COLON SURGERY      COLONOSCOPY N/A 2020    Procedure: COLONOSCOPY;  Surgeon: Shane Parker MD;  Location: [facility];  Service: Endoscopy;  Laterality: N/A;    COLONOSCOPY N/A 2022    Procedure: COLONOSCOPY;  Surgeon: Shane Parker MD;  Location: [facility];  Service: Endoscopy;  Laterality: N/A;    COLONOSCOPY N/A 2023    Procedure: COLONOSCOPY;  Surgeon: Shane Parker MD;  Location: [facility];  Service: Endoscopy;  Laterality: N/A;  no cecum anastomosis    CYSTOSCOPY W/ URETERAL STENT PLACEMENT Bilateral 2020    Procedure: CYSTOSCOPY, WITH URETERAL STENT INSERTION;  Surgeon: Claudio Tyson MD;  Location: [facility] OR [address] [place] [address];  Service: Urology;  Laterality: Bilateral;    ESOPHAGOGASTRODUODENOSCOPY N/A 2024    Procedure: EGD (ESOPHAGOGASTRODUODENOSCOPY);  Surgeon: Shane Parker MD;  Location: [facility];  Service: Gastroenterology;  Laterality: N/A;    EXAMINATION UNDER ANESTHESIA N/A 2024    Procedure: Exam under anesthesia-vagina;  Surgeon: Eli Her MD;  Location: [facility] OR;  Service: OB/GYN;  Laterality: N/A;    EXCISION-WIDE LOCAL Left 2024    Procedure: EXCISION-WIDE LOCAL -  Labia Majora;  Surgeon: Eli Her MD;  Location: [facility] OR;  Service: OB/GYN;  Laterality: Left;  upper left side of labia majora    EXCISIONAL BIOPSY, LYMPH NODE  2022    abdominal x 4    INSERTION OF TUNNELED CENTRAL VENOUS CATHETER  (CVC) WITH SUBCUTANEOUS PORT N/A 05/01/2020    Procedure: [id number];  Surgeon: Doscaprice Diagnostic Provider;  Location: [facility] OR 2ND FLR;  Service: Radiology;  Laterality: N/A;  Room 189/Cindy    LAPAROSCOPIC SIGMOIDECTOMY N/A 03/25/2020    Procedure: COLECTOMY, SIGMOID, LAPAROSCOPIC, flex sig, ERAS high;  Surgeon: Silvio Man MD;  Location: [facility] OR 2ND FLR;  Service: Colon and Rectal;  Laterality: N/A;    MOBILIZATION OF SPLENIC FLEXURE  03/25/2020    Procedure: MOBILIZATION, SPLENIC FLEXURE;  Surgeon: Silvio Man MD;  Location: [facility] OR 2ND FLR;  Service: Colon and Rectal;;    TONSILLECTOMY      TOTAL ABDOMINAL HYSTERECTOMY W/ BILATERAL SALPINGOOPHORECTOMY N/A 03/25/2020    Procedure: HYSTERECTOMY, TOTAL, ABDOMINAL, WITH BILATERAL SALPINGO-OOPHORECTOMY;  Surgeon: Keron Brady MD;  [facility];  Service: Oncology;  Laterality: N/A;       Review of patient's allergies indicates:   Allergen Reactions    Oxaliplatin Other (See Comments)     Itchy hands, throat closed up, trouble hearing - during infusion    Penicillins Hives and Rash     childhood allergy         Current Facility-Administered Medications   Medication Dose Route Frequency Provider Last Rate Last Admin    0.9%  NaCl infusion   Intravenous Continuous Saleem Arora  mL/hr at 04/20/24 0543 New Bag at 04/20/24 0543    acetaminophen tablet 1,000 mg  1,000 mg Oral Q8H Saleem Arora MD   1,000 mg at 04/20/24 0543    buPROPion TB24 tablet 150 mg  150 mg Oral Daily Saleem Arora MD        cinacalcet tablet 30 mg  30 mg Oral Daily with breakfast Saleem Arora MD        dextrose 10% bolus 125 mL 125 mL  12.5 g Intravenous PRN Saleem Arora MD        dextrose 10% bolus 250 mL 250 mL  25 g Intravenous PRN Saleem Arora MD        dicyclomine tablet 20 mg  20 mg Oral BID PRN Saleem Arora MD        famotidine tablet 20  mg  20 mg Oral Daily Saleem Arora MD        gabapentin capsule 300 mg  300 mg Oral QHS Saleem Arora MD        glucagon (human recombinant) injection 1 mg  1 mg Intramuscular PRN Saleem Arora MD        glucose chewable tablet 16 g  16 g Oral PRN Saleem Arora MD        glucose chewable tablet 24 g  24 g Oral PRN Saleem rAora MD        heparin (porcine) injection 7,500 Units  7,500 Units Subcutaneous Q8H Saleem Arora MD        HYDROmorphone injection 1 mg  1 mg Intravenous Q4H PRN Saleem Arora MD   1 mg at 04/20/24 0844    levothyroxine tablet 350 mcg  350 mcg Oral Before breakfast Saleem Arora MD   350 mcg at 04/20/24 0544    LORazepam tablet 1 mg  1 mg Oral Q12H PRN Saleem Arora MD        magnesium sulfate 2g in water 50mL IVPB (premix)  2 g Intravenous Once Cobos, Son, DO        mirtazapine tablet 7.5 mg  7.5 mg Oral Nightly PRN Saleem Arora MD        naloxone 0.4 mg/mL injection 0.02 mg  0.02 mg Intravenous PRN Saleem Arora MD        ondansetron disintegrating tablet 4 mg  4 mg Oral Q8H PRN Saleem Arora MD        oxyCODONE immediate release tablet Tab 10 mg  10 mg Oral Q4H PRN Saleem Arora MD   10 mg at 04/20/24 0640    polyethylene glycol packet 17 g  17 g Oral Daily Saleem Arora MD        potassium chloride SA CR tablet 40 mEq  40 mEq Oral Once Cobos, Son, DO        prochlorperazine tablet 10 mg  10 mg Oral Q6H PRN Saleem Arora MD        senna-docusate 8.6-50 mg per tablet 1 tablet  1 tablet Oral BID Saleem Arora MD        sodium chloride 0.9% flush 10 mL  10 mL Intravenous Q12H PRN Saleem Arora MD         Facility-Administered Medications Ordered in Other Encounters   Medication Dose Route Frequency Provider Last Rate Last Admin    ondansetron injection 4 mg   4 mg Intravenous Daily PRN Volpi-Abadie, Jacqueline, MD        sodium chloride 0.9% flush 3 mL  3 mL Intravenous PRN Volpi-Abadie, Jacqueline, MD         Family History       Problem Relation (Age of Onset)    Cancer Maternal Aunt, Paternal Uncle, Maternal Grandmother    Colon cancer Paternal Uncle    Diabetes Mother (65)    Drug abuse Brother, Brother, Son, Son    Heart disease Father    No Known Problems Daughter    Ovarian cancer Maternal Aunt, Maternal Aunt, Maternal Grandmother, Cousin          Tobacco Use    Smoking status: Never    Smokeless tobacco: Never   Substance and Sexual Activity    Alcohol use: Yes     Comment: occasionally- twice monthly    Drug use: Never    Sexual activity: Yes     Partners: Male     Birth control/protection: See Surgical Hx     Review of Systems   Constitutional:  Negative for chills and fever.   Gastrointestinal:  Negative for nausea and vomiting.   Genitourinary:  Positive for decreased urine volume.   Musculoskeletal:  Positive for arthralgias, gait problem and myalgias.   Neurological:  Positive for weakness.   Psychiatric/Behavioral:  Negative for confusion.      Objective:     Vital Signs (Most Recent):  Temp: 97.8 °F (36.6 °C) (04/20/24 0726)  Pulse: 95 (04/20/24 0726)  Resp: 16 (04/20/24 0844)  BP: 128/74 (04/20/24 0726)  SpO2: (!) 91 % (04/20/24 0726) Vital Signs (24h Range):  Temp:  [97.5 °F (36.4 °C)-98 °F (36.7 °C)] 97.8 °F (36.6 °C)  Pulse:  [] 95  Resp:  [16-22] 16  SpO2:  [91 %-96 %] 91 %  BP: (113-160)/(70-81) 128/74     Weight: 126.3 kg (278 lb 7.1 oz)  Body mass index is 44.94 kg/m².     Physical Exam  Vitals reviewed.   Constitutional:       Appearance: Normal appearance.   HENT:      Head: Normocephalic and atraumatic.   Eyes:      General: No scleral icterus.     Conjunctiva/sclera: Conjunctivae normal.   Pulmonary:      Effort: Pulmonary effort is normal. No respiratory distress.   Skin:     Coloration: Skin is not jaundiced.      Findings: No  erythema.   Psychiatric:         Mood and Affect: Mood normal.         Behavior: Behavior normal.                Significant Labs: All pertinent labs within the past 24 hours have been reviewed.    Significant Imaging: I have reviewed all pertinent imaging results/findings within the past 24 hours.

## 2024-04-20 NOTE — ASSESSMENT & PLAN NOTE
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      Plan:  - per palliative medicine,  - recommend increasing oxycodone to 15 mg q4h prn first line and continue hydromorphone 1 mg IVP as second line therapy  - Oxycontin 20 mg BID , may need up titration prior to discharge  - if okay with oncology team, consider starting low dose steroid such as prednisone 5 mg daily/bid for inflammatory pain given muscle involvement  - patient may also benefit from increase in gabapentin to 300 mg bid as kidney function allows (estimated Cr clearance today 45 so max safe dose is around 900 mg/day)

## 2024-04-20 NOTE — SUBJECTIVE & OBJECTIVE
Interval History:  Patient seen at bedside this morning. Patient noting ongoing severe pain in left hip. Pain is constant, dull/aching, sharp, and burning. Thus far patient has received relief from IV hydromorphone only.     24 hour PRN medication usage:  2 doses of hydromorphone 1 mg IVP  1 dose oxycodone 10 mg PO    Past Medical History:   Diagnosis Date    Anxiety     Depression     FH: ovarian cancer 2020    Hx of psychiatric care     Effexor, Paxil, Lexapro, Zoloft, Wellbutrin, Trazodone Buspar    Hypertension     Hyperthyroidism     Hypothyroid     Kidney calculi     Malignant neoplasm of sigmoid colon 2020    Menorrhagia     Multinodular goiter 2012    Palpitation     Psychiatric problem     Venous insufficiency     Vulvar lesion        Past Surgical History:   Procedure Laterality Date     SECTION, CLASSIC      x3    COLON SURGERY      COLONOSCOPY N/A 2020    Procedure: COLONOSCOPY;  Surgeon: Shane Parker MD;  Location: [facility];  Service: Endoscopy;  Laterality: N/A;    COLONOSCOPY N/A 2022    Procedure: COLONOSCOPY;  Surgeon: Shane Parker MD;  Location: [facility];  Service: Endoscopy;  Laterality: N/A;    COLONOSCOPY N/A 2023    Procedure: COLONOSCOPY;  Surgeon: Shane Parker MD;  Location: [facility];  Service: Endoscopy;  Laterality: N/A;  no cecum anastomosis    CYSTOSCOPY W/ URETERAL STENT PLACEMENT Bilateral 2020    Procedure: CYSTOSCOPY, WITH URETERAL STENT INSERTION;  Surgeon: Claudio Tyson MD;  Location: [address];  Service: Urology;  Laterality: Bilateral;    ESOPHAGOGASTRODUODENOSCOPY N/A 2024    Procedure: EGD (ESOPHAGOGASTRODUODENOSCOPY);  Surgeon: Shane Parker MD;  Location: [facility];  Service: Gastroenterology;  Laterality: N/A;    EXAMINATION UNDER ANESTHESIA N/A 2024    Procedure: Exam under anesthesia-vagina;  Surgeon: Eli Her MD;  Location: [facility];  Service: OB/GYN;  Laterality: N/A;     EXCISION-WIDE LOCAL Left 01/16/2024    Procedure: EXCISION-WIDE LOCAL -  Labia Majora;  Surgeon: Eli Her MD;  Location: [facility] OR;  Service: OB/GYN;  Laterality: Left;  upper left side of labia majora    EXCISIONAL BIOPSY, LYMPH NODE  07/2022    abdominal x 4    INSERTION OF TUNNELED CENTRAL VENOUS CATHETER (CVC) WITH SUBCUTANEOUS PORT N/A 05/01/2020    Procedure: [id number];  Surgeon: Dosc Diagnostic Provider;  Location: [facility] OR 2ND FLR;  Service: Radiology;  Laterality: N/A;  Room 189/Cindy    LAPAROSCOPIC SIGMOIDECTOMY N/A 03/25/2020    Procedure: COLECTOMY, SIGMOID, LAPAROSCOPIC, flex sig, ERAS high;  Surgeon: Silvio Man MD;  Location: [facility] OR 2ND FLR;  Service: Colon and Rectal;  Laterality: N/A;    MOBILIZATION OF SPLENIC FLEXURE  03/25/2020    Procedure: MOBILIZATION, SPLENIC FLEXURE;  Surgeon: Silvio Man MD;  Location: [facility] OR 2ND FLR;  Service: Colon and Rectal;;    TONSILLECTOMY      TOTAL ABDOMINAL HYSTERECTOMY W/ BILATERAL SALPINGOOPHORECTOMY N/A 03/25/2020    Procedure: HYSTERECTOMY, TOTAL, ABDOMINAL, WITH BILATERAL SALPINGO-OOPHORECTOMY;  Surgeon: Keron Brady MD;  Location: [facility] OR 2ND FLR;  Service: Oncology;  Laterality: N/A;       Review of patient's allergies indicates:   Allergen Reactions    Oxaliplatin Other (See Comments)     Itchy hands, throat closed up, trouble hearing - during infusion    Penicillins Hives and Rash     childhood allergy         Medications:  Continuous Infusions:  Current Facility-Administered Medications   Medication Dose Route Frequency Last Rate Last Admin    sodium chloride 0.9%   Intravenous Continuous 100 mL/hr at 04/20/24 0543 New Bag at 04/20/24 0543     Scheduled Meds:  Current Facility-Administered Medications   Medication Dose Route Frequency    acetaminophen  1,000 mg Oral Q8H    buPROPion  150 mg Oral Daily    cinacalcet  30 mg Oral Daily with breakfast    famotidine  20 mg Oral Daily    gabapentin  300 mg Oral  QHS    heparin (porcine)  7,500 Units Subcutaneous Q8H    levothyroxine  350 mcg Oral Before breakfast    magnesium sulfate IVPB  2 g Intravenous Once    polyethylene glycol  17 g Oral Daily    senna-docusate 8.6-50 mg  1 tablet Oral BID     PRN Meds:  Current Facility-Administered Medications:     dextrose 10%, 12.5 g, Intravenous, PRN    dextrose 10%, 25 g, Intravenous, PRN    dicyclomine, 20 mg, Oral, BID PRN    glucagon (human recombinant), 1 mg, Intramuscular, PRN    glucose, 16 g, Oral, PRN    glucose, 24 g, Oral, PRN    HYDROmorphone, 1 mg, Intravenous, Q4H PRN    LORazepam, 1 mg, Oral, Q12H PRN    mirtazapine, 7.5 mg, Oral, Nightly PRN    naloxone, 0.02 mg, Intravenous, PRN    ondansetron, 4 mg, Oral, Q8H PRN    oxyCODONE, 10 mg, Oral, Q4H PRN    prochlorperazine, 10 mg, Oral, Q6H PRN    sodium chloride 0.9%, 10 mL, Intravenous, Q12H PRN    Family History       Problem Relation (Age of Onset)    Cancer Maternal Aunt, Paternal Uncle, Maternal Grandmother    Colon cancer Paternal Uncle    Diabetes Mother (65)    Drug abuse Brother, Brother, Son, Son    Heart disease Father    No Known Problems Daughter    Ovarian cancer Maternal Aunt, Maternal Aunt, Maternal Grandmother, Cousin          Tobacco Use    Smoking status: Never    Smokeless tobacco: Never   Substance and Sexual Activity    Alcohol use: Yes     Comment: occasionally- twice monthly    Drug use: Never    Sexual activity: Yes     Partners: Male     Birth control/protection: See Surgical Hx       Review of Systems   Constitutional:  Positive for activity change, appetite change and fatigue.   HENT: Negative.     Eyes: Negative.    Respiratory: Negative.     Cardiovascular: Negative.    Gastrointestinal:  Positive for abdominal pain, constipation and nausea.   Genitourinary: Negative.    Musculoskeletal:  Positive for arthralgias, gait problem and myalgias.   Skin: Negative.    Neurological:  Negative for dizziness and light-headedness.    Psychiatric/Behavioral:  Positive for dysphoric mood. The patient is nervous/anxious.    All other systems reviewed and are negative.    Objective:     Vital Signs (Most Recent):  Temp: 97.8 °F (36.6 °C) (04/20/24 0726)  Pulse: 95 (04/20/24 0726)  Resp: 16 (04/20/24 0844)  BP: 128/74 (04/20/24 0726)  SpO2: (!) 91 % (04/20/24 0726) Vital Signs (24h Range):  Temp:  [97.5 °F (36.4 °C)-98 °F (36.7 °C)] 97.8 °F (36.6 °C)  Pulse:  [] 95  Resp:  [16-22] 16  SpO2:  [91 %-96 %] 91 %  BP: (113-160)/(70-81) 128/74     Weight: 126.3 kg (278 lb 7.1 oz)  Body mass index is 44.94 kg/m².       Physical Exam  Vitals reviewed.   Constitutional:       General: She is not in acute distress.     Appearance: She is not toxic-appearing or diaphoretic.   HENT:      Head: Normocephalic and atraumatic.      Right Ear: External ear normal.      Left Ear: External ear normal.      Nose: Nose normal.      Mouth/Throat:      Mouth: Mucous membranes are moist.   Eyes:      General: No scleral icterus.        Right eye: No discharge.         Left eye: No discharge.      Extraocular Movements: Extraocular movements intact.   Neck:      Comments: Trachea midline  Pulmonary:      Effort: Pulmonary effort is normal. No respiratory distress.   Abdominal:      Palpations: There is no mass.      Tenderness: There is no abdominal tenderness.   Musculoskeletal:         General: No deformity or signs of injury.      Cervical back: Normal range of motion.   Skin:     General: Skin is dry.      Coloration: Skin is not jaundiced.      Findings: No rash.   Neurological:      General: No focal deficit present.      Mental Status: She is alert and oriented to person, place, and time.      Cranial Nerves: No cranial nerve deficit.   Psychiatric:         Mood and Affect: Mood is depressed. Affect is tearful.         Speech: Speech normal.         Cognition and Memory: Cognition normal.         Judgment: Judgment normal.            Review of  Symptoms      Symptom Assessment (ESAS 0-10 Scale)  Pain:  10  Dyspnea:  0  Anxiety:  5  Nausea:  3  Depression:  5  Anorexia:  8  Fatigue:  6  Insomnia:  0  Restlessness:  0  Agitation:  0     CAM / Delirium:  Negative  Constipation:  Positive  Diarrhea:  Negative    Anxiety:  Is nervous/anxious  Constipation:  Constipation    Bowel Management Plan (BMP):  Yes      Pain Assessment:  OME in 24 hours:  50  Location(s): leg    Leg       Location: left        Quality: Aching, dull, stabbing and burning        Quantity: 10/10 in intensity        Chronicity: Onset 1 week(s) ago, gradually worsening        Aggravating Factors: Activity and sitting up        Alleviating Factors: Opiates        Associated Symptoms: Nausea    Modified Ramona Scale:  0    ECOG Performance Status ndGndrndanddndend:nd nd2nd Living Arrangements:  Lives with family and Lives in home    Psychosocial/Cultural:   See Palliative Psychosocial Note: No  Social Issues Identified: Coping deficit pt/family and Mental Health  Bereavement Risk: No  Caregiver Needs Discussed. Caregiver Distress: No: n/a  Cultural: patient enjoys shopping; she has three children (2 sons and 1 daughter). Has good support with her mother and close friends.   **Primary  to Follow**  Palliative Care  Consult: No    Spiritual:  F - Marilyn and Belief:  Yes  I - Importance:  Yes  C - Community:  Yes  A - Address in Care:  Needs met - discussed  services        Advance Care Planning   Advance Directives:   Living Will: No    [place]: No    Do Not Resuscitate Status: No    Medical Power of : No        Oral Declaration: Yes  Agent's Name:  Star Mckinnon   Agent's Contact Number:  [phone]    Decision Making:  Patient answered questions  Goals of Care: I engaged the patient in a voluntary conversation about advance care planning and we specifically addressed what the goals of care would be moving forward, in light of the patient's change in clinical status,  "specifically sigmoid cancer.  We did specifically address the patient's likely prognosis, which is poor.  We explored the patient's values and preferences for future care.  The patient endorses that what is most important right now is to focus on remaining as independent as possible, symptom/pain control, and extending life as long as possible.    Accordingly, we have decided that the best plan to meet the patient's goals includes continuing with treatment    I did not explain the role for hospice care at this stage of the patient's illness, including its ability to help the patient live with the best quality of life possible.  We will not be making a hospice referral.           Significant Labs: All pertinent labs within the past 24 hours have been reviewed.  CBC:   Recent Labs   Lab 04/20/24 0443   WBC 5.48   HGB 12.9   HCT 40.3   MCV 94        BMP:  Recent Labs   Lab 04/20/24 0443   GLU 97      K 3.6      CO2 15*   BUN 28*   CREATININE 2.8*   CALCIUM 9.5   MG 1.5*     LFT:  Lab Results   Component Value Date    AST 15 04/20/2024    ALKPHOS 136 (H) 04/20/2024    BILITOT 0.3 04/20/2024     Albumin:   Albumin   Date Value Ref Range Status   04/20/2024 2.5 (L) 3.5 - 5.2 g/dL Final     Protein:   Total Protein   Date Value Ref Range Status   04/20/2024 6.2 6.0 - 8.4 g/dL Final     Lactic acid:   No results found for: "LACTATE"    Significant Imaging: I have reviewed all pertinent imaging results/findings within the past 24 hours.    04/20/2024 US retroperitoneal: "Moderate bilateral hydronephrosis, corresponding to findings on prior CT performed 03/11/2024. At least 1 nonobstructing right renal calculus is noted.  Additional smaller no calculi seen on prior CT are not well characterized. Nonspecific increased attenuation dependently within the urinary bladder.  No definite internal vascularity on single provided image.  Findings are nonspecific and could relate to debris and/or hemorrhage.  Soft " "tissue mass would be difficult to exclude on limited images.  Cross-sectional imaging could be considered for further characterization."    "

## 2024-04-20 NOTE — HOSPITAL COURSE
Patient admitted for MARILOU and intractable hip pain uncontrolled with outpatient p.o. medication.  Palliative medicine recommended increasing oxycodone to 15 PRN, starting OxyContin 20 mg b.i.d. worsened MARILOU after fluids and due to bilateral hydronephrosis on retroperitoneal ultrasound, Urology consulted and recommending repeat UA with pyelogram and ureteral stents on 4/22. Unable to visualize ureteral openings, case aborted. IR consulted for bilateral nephrostomy placement, s/p placement 4/22. Overall hip pain improving at rest, however still painful with movement, rad/onc consulted for palliative radiation evaluation of soft tissue mets of Left iliopsoas, received 1 session of radiation 4/25. Palliative care continues to follow, adjusting pain regimen. PT/OT consulted, recommending low intensity therapy.     5/1 Patient developed abdominal distention yesterday and KUB with possible obstruction. NG placed and 600mL returned. Patient pulled out NG overnight. This morning she was too confused for another NG to be placed. HR increasing up to the 160's. O2 increasing up to 15L. Patient was too unstable for CT imaging. Had a long discussion with patient, her mother, and her daughter (on the phone). Patients metastatic cancer is incurable and we are concerning she has a bowel obstruction that will not improve and she is not a surgical candidate. We recommended making patient DNR and transitioning to comfort care, patients family was in agreement. Patient was discharged to inpatient hospice on 5/1.

## 2024-04-20 NOTE — HPI
Ms. 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 Of note, patient's recent imaging showed evidence of progression. Outpatient plan to start fruquintinib. In ED patient noted to have MARILOU. Imaging showed tumor involvement at left hip iliopsoas. Patient admitted for pain management and MARILOU. Palliative Medicine consulted for symptom management.

## 2024-04-20 NOTE — CONSULTS
[facility] - Transplant Stepdown  Palliative Medicine  Consult Note    Patient Name: Gail Mckinnon  MRN: [id number]  Admission Date: 4/19/2024  Hospital Length of Stay: 0 days  Code Status: Full Code   Attending Provider: Virgie Rice MD  Consulting Provider: Montserrat Villegas MD  Primary Care Physician: Marah Santo MD  Principal Problem:Hip pain    Patient information was obtained from patient, past medical records, primary team, and current medical record .      Inpatient consult to Palliative Care  Consult performed by: Montserrat Villegas MD  Consult ordered by: Compa Cobos DO        Assessment/Plan:     Palliative Care  Palliative care encounter  Ms. 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 Of note, patient's recent imaging showed evidence of progression. Outpatient plan to start fruquintinib. In ED patient noted to have MARILOU. Imaging showed tumor involvement at left hip iliopsoas. Patient admitted for pain management and MARILOU. Palliative Medicine consulted for symptom management.    Sigmoid cancer/MARILOU/HTN/thyroid problem/other medical problems  - plan per primary team and other speciality consultants    GOC/ACP  - patient decisional and by herself in room today  - no ACP documents uploaded into EMR  - patient follows with Palliative Medicine NP Claudio and Dr. Hill on [employer] already; next appt: 04/23/24  - NOK: patient's children  - today patient verbally stated she would want her middle son, Star Mckinnon, to be decision maker at [phone]  - will follow up at future visits to fill out ACP forms  - goals: improvement in symptoms to be able to go on cruise on Thursday 04/25/24 with friends; also continue life prolonging measures  - code status not discussed today    Cancer related pain  - patient reporting severe pain in left hip; she also has pain at times in  abdomen/groin.   - 24 hour OME: 50   - outpatient regimen: oxycodone-apap  mg q4h prn, gabapentin 300 mg qhs, and flexeril 5 mg q8h prn. This was not providing any relief.  - currently only receiving relief from hydromorphone 1 mg IVP PRN  - recommend increasing oxycodone to 15 mg q4h prn first line and continue hydromorphone 1 mg IVP as second line therapy  - start Oxycontin 20 mg BID today, may need up titration prior to discharge  - if okay with oncology team, consider starting low dose steroid such as prednisone 5 mg daily/bid for inflammatory pain given muscle involvement  - patient may also benefit from increase in gabapentin to 300 mg bid as kidney function allows (estimated Cr clearance today 45 so max safe dose is around 900 mg/day)    Nausea/Anorexia  - patient with increased nausea due to increased pain  - patient with poor appetite constantly  - would benefit from nutrition consult while in the hospital  - continue zofran 4 mg q8h prn and prochlorperazine 10 mg q6h prn    Constipation  - ongoing issue  - uses senna at home with some relief  - continue senna and miralax as ordered    Anxiety/depression/spiritual distress  - patient feeling very overwhelmed with news of progression  - she reports not feeling at peace spiritually  - emotional support provided today  - continue Wellbutrin, lorazepam, and mirtazapine  - consider scheduling mirtazapine 7.5 mg qhs    Above plan of care discussed with primary team, including attending of record, Dr. Rice.        Thank you for your consult. I will follow-up with patient. Please contact us if you have any additional questions.    Subjective:     HPI:   Ms. Mckinnon is a 53 y/o F with HTN, depression/anxiety, thyroid problem, and sigmoid carcinoma (s/p multiple lines of treatment) presented with worsening left hip pain. Of note, patient's recent imaging showed evidence of progression. Outpatient plan to start fruquintinib. In ED patient noted to have MARILOU.  Imaging showed tumor involvement at left hip iliopsoas. Patient admitted for pain management and MARILOU. Palliative Medicine consulted for symptom management.    Hospital Course:  No notes on file    Interval History:  Patient seen at bedside this morning. Patient noting ongoing severe pain in left hip. Pain is constant, dull/aching, sharp, and burning. Thus far patient has received relief from IV hydromorphone only.     24 hour PRN medication usage:  2 doses of hydromorphone 1 mg IVP  1 dose oxycodone 10 mg PO    Past Medical History:   Diagnosis Date    Anxiety     Depression     FH: ovarian cancer 2020    Hx of psychiatric care     Effexor, Paxil, Lexapro, Zoloft, Wellbutrin, Trazodone Buspar    Hypertension     Hyperthyroidism     Hypothyroid     Kidney calculi     Malignant neoplasm of sigmoid colon 2020    Menorrhagia     Multinodular goiter 2012    Palpitation     Psychiatric problem     Venous insufficiency     Vulvar lesion        Past Surgical History:   Procedure Laterality Date     SECTION, CLASSIC      x3    COLON SURGERY      COLONOSCOPY N/A 2020    Procedure: COLONOSCOPY;  Surgeon: Shane Parkre MD;  Location: [facility];  Service: Endoscopy;  Laterality: N/A;    COLONOSCOPY N/A 2022    Procedure: COLONOSCOPY;  Surgeon: Shane Parker MD;  Location: [facility];  Service: Endoscopy;  Laterality: N/A;    COLONOSCOPY N/A 2023    Procedure: COLONOSCOPY;  Surgeon: Shane Parker MD;  Location: [facility];  Service: Endoscopy;  Laterality: N/A;  no cecum anastomosis    CYSTOSCOPY W/ URETERAL STENT PLACEMENT Bilateral 2020    Procedure: CYSTOSCOPY, WITH URETERAL STENT INSERTION;  Surgeon: Claudio Tyson MD;  Location: [facility] OR [address] [place] [address];  Service: Urology;  Laterality: Bilateral;    ESOPHAGOGASTRODUODENOSCOPY N/A 2024    Procedure: EGD (ESOPHAGOGASTRODUODENOSCOPY);  Surgeon: Shane Parker MD;  Location: [facility];  Service: Gastroenterology;   Laterality: N/A;    EXAMINATION UNDER ANESTHESIA N/A 01/16/2024    Procedure: Exam under anesthesia-vagina;  Surgeon: Eli Her MD;  Location: [facility] OR;  Service: OB/GYN;  Laterality: N/A;    EXCISION-WIDE LOCAL Left 01/16/2024    Procedure: EXCISION-WIDE LOCAL -  Labia Majora;  Surgeon: Eli Her MD;  Location: [facility] OR;  Service: OB/GYN;  Laterality: Left;  upper left side of labia majora    EXCISIONAL BIOPSY, LYMPH NODE  07/2022    abdominal x 4    INSERTION OF TUNNELED CENTRAL VENOUS CATHETER (CVC) WITH SUBCUTANEOUS PORT N/A 05/01/2020    Procedure: DIQEUURLY-WOYG-S-CATH;  Surgeon: Doscaprice Diagnostic Provider;  Location: [facility] OR 2ND FLR;  Service: Radiology;  Laterality: N/A;  Room 189/[facility]    LAPAROSCOPIC SIGMOIDECTOMY N/A 03/25/2020    Procedure: COLECTOMY, SIGMOID, LAPAROSCOPIC, flex sig, ERAS high;  Surgeon: Silvio Man MD;  Location: [facility] OR 2ND FLR;  Service: Colon and Rectal;  Laterality: N/A;    MOBILIZATION OF SPLENIC FLEXURE  03/25/2020    Procedure: MOBILIZATION, SPLENIC FLEXURE;  Surgeon: Silvio Man MD;  Location: [facility] OR 2ND FLR;  Service: Colon and Rectal;;    TONSILLECTOMY      TOTAL ABDOMINAL HYSTERECTOMY W/ BILATERAL SALPINGOOPHORECTOMY N/A 03/25/2020    Procedure: HYSTERECTOMY, TOTAL, ABDOMINAL, WITH BILATERAL SALPINGO-OOPHORECTOMY;  Surgeon: Keron Brady MD;  Location: [facility] OR 2ND FLR;  Service: Oncology;  Laterality: N/A;       Review of patient's allergies indicates:   Allergen Reactions    Oxaliplatin Other (See Comments)     Itchy hands, throat closed up, trouble hearing - during infusion    Penicillins Hives and Rash     childhood allergy         Medications:  Continuous Infusions:  Current Facility-Administered Medications   Medication Dose Route Frequency Last Rate Last Admin    sodium chloride 0.9%   Intravenous Continuous 100 mL/hr at 04/20/24 0543 New Bag at 04/20/24 0543     Scheduled Meds:  Current Facility-Administered Medications    Medication Dose Route Frequency    acetaminophen  1,000 mg Oral Q8H    buPROPion  150 mg Oral Daily    cinacalcet  30 mg Oral Daily with breakfast    famotidine  20 mg Oral Daily    gabapentin  300 mg Oral QHS    heparin (porcine)  7,500 Units Subcutaneous Q8H    levothyroxine  350 mcg Oral Before breakfast    magnesium sulfate IVPB  2 g Intravenous Once    polyethylene glycol  17 g Oral Daily    senna-docusate 8.6-50 mg  1 tablet Oral BID     PRN Meds:  Current Facility-Administered Medications:     dextrose 10%, 12.5 g, Intravenous, PRN    dextrose 10%, 25 g, Intravenous, PRN    dicyclomine, 20 mg, Oral, BID PRN    glucagon (human recombinant), 1 mg, Intramuscular, PRN    glucose, 16 g, Oral, PRN    glucose, 24 g, Oral, PRN    HYDROmorphone, 1 mg, Intravenous, Q4H PRN    LORazepam, 1 mg, Oral, Q12H PRN    mirtazapine, 7.5 mg, Oral, Nightly PRN    naloxone, 0.02 mg, Intravenous, PRN    ondansetron, 4 mg, Oral, Q8H PRN    oxyCODONE, 10 mg, Oral, Q4H PRN    prochlorperazine, 10 mg, Oral, Q6H PRN    sodium chloride 0.9%, 10 mL, Intravenous, Q12H PRN    Family History       Problem Relation (Age of Onset)    Cancer Maternal Aunt, Paternal Uncle, Maternal Grandmother    Colon cancer Paternal Uncle    Diabetes Mother (65)    Drug abuse Brother, Brother, Son, Son    Heart disease Father    No Known Problems Daughter    Ovarian cancer Maternal Aunt, Maternal Aunt, Maternal Grandmother, Cousin          Tobacco Use    Smoking status: Never    Smokeless tobacco: Never   Substance and Sexual Activity    Alcohol use: Yes     Comment: occasionally- twice monthly    Drug use: Never    Sexual activity: Yes     Partners: Male     Birth control/protection: See Surgical Hx       Review of Systems   Constitutional:  Positive for activity change, appetite change and fatigue.   HENT: Negative.     Eyes: Negative.    Respiratory: Negative.     Cardiovascular: Negative.    Gastrointestinal:  Positive for abdominal pain, constipation and  nausea.   Genitourinary: Negative.    Musculoskeletal:  Positive for arthralgias, gait problem and myalgias.   Skin: Negative.    Neurological:  Negative for dizziness and light-headedness.   Psychiatric/Behavioral:  Positive for dysphoric mood. The patient is nervous/anxious.    All other systems reviewed and are negative.    Objective:     Vital Signs (Most Recent):  Temp: 97.8 °F (36.6 °C) (04/20/24 0726)  Pulse: 95 (04/20/24 0726)  Resp: 16 (04/20/24 0844)  BP: 128/74 (04/20/24 0726)  SpO2: (!) 91 % (04/20/24 0726) Vital Signs (24h Range):  Temp:  [97.5 °F (36.4 °C)-98 °F (36.7 °C)] 97.8 °F (36.6 °C)  Pulse:  [] 95  Resp:  [16-22] 16  SpO2:  [91 %-96 %] 91 %  BP: (113-160)/(70-81) 128/74     Weight: 126.3 kg (278 lb 7.1 oz)  Body mass index is 44.94 kg/m².       Physical Exam  Vitals reviewed.   Constitutional:       General: She is not in acute distress.     Appearance: She is not toxic-appearing or diaphoretic.   HENT:      Head: Normocephalic and atraumatic.      Right Ear: External ear normal.      Left Ear: External ear normal.      Nose: Nose normal.      Mouth/Throat:      Mouth: Mucous membranes are moist.   Eyes:      General: No scleral icterus.        Right eye: No discharge.         Left eye: No discharge.      Extraocular Movements: Extraocular movements intact.   Neck:      Comments: Trachea midline  Pulmonary:      Effort: Pulmonary effort is normal. No respiratory distress.   Abdominal:      Palpations: There is no mass.      Tenderness: There is no abdominal tenderness.   Musculoskeletal:         General: No deformity or signs of injury.      Cervical back: Normal range of motion.   Skin:     General: Skin is dry.      Coloration: Skin is not jaundiced.      Findings: No rash.   Neurological:      General: No focal deficit present.      Mental Status: She is alert and oriented to person, place, and time.      Cranial Nerves: No cranial nerve deficit.   Psychiatric:         Mood and Affect:  Mood is depressed. Affect is tearful.         Speech: Speech normal.         Cognition and Memory: Cognition normal.         Judgment: Judgment normal.            Review of Symptoms      Symptom Assessment (ESAS 0-10 Scale)  Pain:  10  Dyspnea:  0  Anxiety:  5  Nausea:  3  Depression:  5  Anorexia:  8  Fatigue:  6  Insomnia:  0  Restlessness:  0  Agitation:  0     CAM / Delirium:  Negative  Constipation:  Positive  Diarrhea:  Negative    Anxiety:  Is nervous/anxious  Constipation:  Constipation    Bowel Management Plan (BMP):  Yes      Pain Assessment:  OME in 24 hours:  50  Location(s): leg    Leg       Location: left        Quality: Aching, dull, stabbing and burning        Quantity: 10/10 in intensity        Chronicity: Onset 1 week(s) ago, gradually worsening        Aggravating Factors: Activity and sitting up        Alleviating Factors: Opiates        Associated Symptoms: Nausea    Modified Ramona Scale:  0    ECOG Performance Status ndGndrndanddndend:nd nd2nd Living Arrangements:  Lives with family and Lives in home    Psychosocial/Cultural:   See Palliative Psychosocial Note: No  Social Issues Identified: Coping deficit pt/family and Mental Health  Bereavement Risk: No  Caregiver Needs Discussed. Caregiver Distress: No: n/a  Cultural: patient enjoys shopping; she has three children (2 sons and 1 daughter). Has good support with her mother and close friends.   **Primary  to Follow**  Palliative Care  Consult: No    Spiritual:  F - Marilyn and Belief:  Yes  I - Importance:  Yes  C - Community:  Yes  A - Address in Care:  Needs met - discussed  services        Advance Care Planning  Advance Directives:   Living Will: No    [place]POST: No    Do Not Resuscitate Status: No    Medical Power of : No        Oral Declaration: Yes  Agent's Name:  Star Mckinnon   Agent's Contact Number:  [phone]    Decision Making:  Patient answered questions  Goals of Care: I engaged the patient in a voluntary  "conversation about advance care planning and we specifically addressed what the goals of care would be moving forward, in light of the patient's change in clinical status, specifically sigmoid cancer.  We did specifically address the patient's likely prognosis, which is poor.  We explored the patient's values and preferences for future care.  The patient endorses that what is most important right now is to focus on remaining as independent as possible, symptom/pain control, and extending life as long as possible.    Accordingly, we have decided that the best plan to meet the patient's goals includes continuing with treatment    I did not explain the role for hospice care at this stage of the patient's illness, including its ability to help the patient live with the best quality of life possible.  We will not be making a hospice referral.           Significant Labs: All pertinent labs within the past 24 hours have been reviewed.  CBC:   Recent Labs   Lab 04/20/24 0443   WBC 5.48   HGB 12.9   HCT 40.3   MCV 94        BMP:  Recent Labs   Lab 04/20/24 0443   GLU 97      K 3.6      CO2 15*   BUN 28*   CREATININE 2.8*   CALCIUM 9.5   MG 1.5*     LFT:  Lab Results   Component Value Date    AST 15 04/20/2024    ALKPHOS 136 (H) 04/20/2024    BILITOT 0.3 04/20/2024     Albumin:   Albumin   Date Value Ref Range Status   04/20/2024 2.5 (L) 3.5 - 5.2 g/dL Final     Protein:   Total Protein   Date Value Ref Range Status   04/20/2024 6.2 6.0 - 8.4 g/dL Final     Lactic acid:   No results found for: "LACTATE"    Significant Imaging: I have reviewed all pertinent imaging results/findings within the past 24 hours.    04/20/2024 US retroperitoneal: "Moderate bilateral hydronephrosis, corresponding to findings on prior CT performed 03/11/2024. At least 1 nonobstructing right renal calculus is noted.  Additional smaller no calculi seen on prior CT are not well characterized. Nonspecific increased attenuation " "dependently within the urinary bladder.  No definite internal vascularity on single provided image.  Findings are nonspecific and could relate to debris and/or hemorrhage.  Soft tissue mass would be difficult to exclude on limited images.  Cross-sectional imaging could be considered for further characterization."    A total of 17 min was spent on advance care planning, goals of care discussion, emotional support, formulating and communicating prognosis and goals of care, exploring burden/benefit of various approaches of treatment. This discussion occurred on a fully voluntary basis with the verbal consent of the patient and/or family      Montserrat Villegas MD  Palliative Medicine  [facility] - Transplant Stepdown      Advance Care Planning     Date: 04/20/2024    Power of   I initiated the process of voluntary advance care planning today and explained the importance of this process to the patient.  I introduced the concept of advance directives to the patient, as well. Then the patient received detailed information about the importance of designating a Health Care Power of  (HCPOA). She was also instructed to communicate with this person about their wishes for future healthcare, should she become sick and lose decision-making capacity. The patient has not previously appointed a HCPOA. After our discussion, the patient has decided to complete a HCPOA and has appointed her son, health care agent:  Star Mckinnon  & health care agent number:  [phone]                  "

## 2024-04-20 NOTE — PLAN OF CARE
Pt AAOx4. VSS, afebrile, on room air. Pain mildly controlled on pain regimen. Palliative care consulted for pain management. Pt on regular diet, 0 BM/shift. Bowel regimen continued. NS infusing @ 100 cc/hr. UA collected & retroperitoneal US completed this AM. K on AM labs 3.6, Mg 1.5, IV & PO replacements administered. Pt ambulating in room independently if pain is controlled. Non-skid socks on pt when OOB. Bed in lowest position, wheels locked, call light within pt reach. Pt updated on plan of care. Family @ bedside.

## 2024-04-20 NOTE — H&P
[facility] - Transplant [facility]  History & Physical    Patient Name: Gail Mckinnon  MRN: [id number]  Admission Date: 2024  Attending Physician: Virgie Rice MD   Primary Care Provider: Marah Santo MD         Patient information was obtained from patient, past medical records, and ER records.       Subjective:     Principal Problem:Hip pain    Chief Complaint:   Chief Complaint   Patient presents with    Leg Pain     Left upper thigh pain and swelling. Difficulty ambulating. Denies trauma. On chemo for metastatic colon cancer.          HPI: 54F with sigmoid cancer (patient of Dr. Pimentel, has progressed through multiple therapies and recently on single agent Debo) with evidence of disease progression and active OP plans to start fruquintinib who presented to the ED with hip pain. Vitals stable, labs show MARILOU. CT shows  tumor involvement at left hip illiopsoas which may be contributing to pain. Patient admitted for pain management and MARILOU.    Past Medical History:   Diagnosis Date    Anxiety     Depression     FH: ovarian cancer 2020    Hx of psychiatric care     Effexor, Paxil, Lexapro, Zoloft, Wellbutrin, Trazodone Buspar    Hypertension     Hyperthyroidism     Hypothyroid     Kidney calculi     Malignant neoplasm of sigmoid colon 2020    Menorrhagia     Multinodular goiter 2012    Palpitation     Psychiatric problem     Venous insufficiency     Vulvar lesion        Past Surgical History:   Procedure Laterality Date     SECTION, CLASSIC      x3    COLON SURGERY      COLONOSCOPY N/A 2020    Procedure: COLONOSCOPY;  Surgeon: Shane Parker MD;  Location: [facility];  Service: Endoscopy;  Laterality: N/A;    COLONOSCOPY N/A 2022    Procedure: COLONOSCOPY;  Surgeon: Shane Parker MD;  Location: [facility] ENDO;  Service: Endoscopy;  Laterality: N/A;    COLONOSCOPY N/A 2023    Procedure: COLONOSCOPY;  Surgeon: Shane Parker MD;   Location: [facility] ENDO;  Service: Endoscopy;  Laterality: N/A;  no cecum anastomosis    CYSTOSCOPY W/ URETERAL STENT PLACEMENT Bilateral 03/25/2020    Procedure: CYSTOSCOPY, WITH URETERAL STENT INSERTION;  Surgeon: Claudio Tyson MD;  Location: [facility] OR 2ND FLR;  Service: Urology;  Laterality: Bilateral;    ESOPHAGOGASTRODUODENOSCOPY N/A 4/9/2024    Procedure: EGD (ESOPHAGOGASTRODUODENOSCOPY);  Surgeon: Shane Parker MD;  Location: [facility] ENDO;  Service: Gastroenterology;  Laterality: N/A;    EXAMINATION UNDER ANESTHESIA N/A 01/16/2024    Procedure: Exam under anesthesia-vagina;  Surgeon: Eli Her MD;  [facility];  Service: OB/GYN;  Laterality: N/A;    EXCISION-WIDE LOCAL Left 01/16/2024    Procedure: EXCISION-WIDE LOCAL -  Labia Majora;  Surgeon: Eli Her MD;  [facility];  Service: OB/GYN;  Laterality: Left;  upper left side of labia majora    EXCISIONAL BIOPSY, LYMPH NODE  07/2022    abdominal x 4    INSERTION OF TUNNELED CENTRAL VENOUS CATHETER (CVC) WITH SUBCUTANEOUS PORT N/A 05/01/2020    Procedure: [id number];  Surgeon: [facility] Diagnostic Provider;  [facility];  Service: Radiology;  Laterality: N/A;  Room 189/[facility]    LAPAROSCOPIC SIGMOIDECTOMY N/A 03/25/2020    Procedure: COLECTOMY, SIGMOID, LAPAROSCOPIC, flex sig, ERAS high;  Surgeon: Silvio Man MD;  Location: [facility] OR 2ND FLR;  Service: Colon and Rectal;  Laterality: N/A;    MOBILIZATION OF SPLENIC FLEXURE  03/25/2020    Procedure: MOBILIZATION, SPLENIC FLEXURE;  Surgeon: Silvio Man MD;  Location: [facility] OR 2ND FLR;  Service: Colon and Rectal;;    TONSILLECTOMY      TOTAL ABDOMINAL HYSTERECTOMY W/ BILATERAL SALPINGOOPHORECTOMY N/A 03/25/2020    Procedure: HYSTERECTOMY, TOTAL, ABDOMINAL, WITH BILATERAL SALPINGO-OOPHORECTOMY;  Surgeon: Keron Brady MD;  Location: [facility] OR 2ND FLR;  Service: Oncology;  Laterality: N/A;       Review of patient's allergies indicates:   Allergen  Reactions    Oxaliplatin Other (See Comments)     Itchy hands, throat closed up, trouble hearing - during infusion    Penicillins Hives and Rash     childhood allergy         Current Facility-Administered Medications   Medication Dose Route Frequency Provider Last Rate Last Admin    0.9%  NaCl infusion   Intravenous Continuous Saleem Arora  mL/hr at 04/20/24 0543 New Bag at 04/20/24 0543    acetaminophen tablet 1,000 mg  1,000 mg Oral Q8H Saleem Arora MD   1,000 mg at 04/20/24 0543    buPROPion TB24 tablet 150 mg  150 mg Oral Daily Saleem Arora MD        cinacalcet tablet 30 mg  30 mg Oral Daily with breakfast Saleem Arora MD        dextrose 10% bolus 125 mL 125 mL  12.5 g Intravenous PRN Saleem Arora MD        dextrose 10% bolus 250 mL 250 mL  25 g Intravenous PRN Saleem Arora MD        dicyclomine tablet 20 mg  20 mg Oral BID PRN Saleem Arora MD        famotidine tablet 20 mg  20 mg Oral Daily Saleem Arora MD        gabapentin capsule 300 mg  300 mg Oral QHS Saleem Arora MD        glucagon (human recombinant) injection 1 mg  1 mg Intramuscular PRN Saleem Arora MD        glucose chewable tablet 16 g  16 g Oral PRN Saleem Arora MD        glucose chewable tablet 24 g  24 g Oral PRN Saleem Arora MD        heparin (porcine) injection 7,500 Units  7,500 Units Subcutaneous Q8H Saleem Arora MD        HYDROmorphone injection 1 mg  1 mg Intravenous Q4H PRN Saleem Arora MD   1 mg at 04/20/24 0844    levothyroxine tablet 350 mcg  350 mcg Oral Before breakfast Saleem Arora MD   350 mcg at 04/20/24 0544    LORazepam tablet 1 mg  1 mg Oral Q12H PRN Saleem Arora MD        magnesium sulfate 2g in water 50mL IVPB (premix)  2 g Intravenous Once Compa Cobos DO        mirtazapine tablet 7.5  mg  7.5 mg Oral Nightly PRN Saleem Arora MD        naloxone 0.4 mg/mL injection 0.02 mg  0.02 mg Intravenous PRN Saleem Arora MD        ondansetron disintegrating tablet 4 mg  4 mg Oral Q8H PRN Saleem Arora MD        oxyCODONE immediate release tablet Tab 10 mg  10 mg Oral Q4H PRN Saleem Arora MD   10 mg at 04/20/24 0640    polyethylene glycol packet 17 g  17 g Oral Daily Saleem Arora MD        potassium chloride SA CR tablet 40 mEq  40 mEq Oral Once Cobos, Son, DO        prochlorperazine tablet 10 mg  10 mg Oral Q6H PRN Saleem Arora MD        senna-docusate 8.6-50 mg per tablet 1 tablet  1 tablet Oral BID Saleem Arora MD        sodium chloride 0.9% flush 10 mL  10 mL Intravenous Q12H PRN Saleem Arora MD         Facility-Administered Medications Ordered in Other Encounters   Medication Dose Route Frequency Provider Last Rate Last Admin    ondansetron injection 4 mg  4 mg Intravenous Daily PRN Volpi-Abadie, Jacqueline, MD        sodium chloride 0.9% flush 3 mL  3 mL Intravenous PRN Volpi-Abadie, Jacqueline, MD         Family History       Problem Relation (Age of Onset)    Cancer Maternal Aunt, Paternal Uncle, Maternal Grandmother    Colon cancer Paternal Uncle    Diabetes Mother (65)    Drug abuse Brother, Brother, Son, Son    Heart disease Father    No Known Problems Daughter    Ovarian cancer Maternal Aunt, Maternal Aunt, Maternal Grandmother, Cousin          Tobacco Use    Smoking status: Never    Smokeless tobacco: Never   Substance and Sexual Activity    Alcohol use: Yes     Comment: occasionally- twice monthly    Drug use: Never    Sexual activity: Yes     Partners: Male     Birth control/protection: See Surgical Hx     Review of Systems   Constitutional:  Negative for chills and fever.   Gastrointestinal:  Negative for nausea and vomiting.   Genitourinary:  Positive for decreased urine  volume.   Musculoskeletal:  Positive for arthralgias, gait problem and myalgias.   Neurological:  Positive for weakness.   Psychiatric/Behavioral:  Negative for confusion.      Objective:     Vital Signs (Most Recent):  Temp: 97.8 °F (36.6 °C) (04/20/24 0726)  Pulse: 95 (04/20/24 0726)  Resp: 16 (04/20/24 0844)  BP: 128/74 (04/20/24 0726)  SpO2: (!) 91 % (04/20/24 0726) Vital Signs (24h Range):  Temp:  [97.5 °F (36.4 °C)-98 °F (36.7 °C)] 97.8 °F (36.6 °C)  Pulse:  [] 95  Resp:  [16-22] 16  SpO2:  [91 %-96 %] 91 %  BP: (113-160)/(70-81) 128/74     Weight: 126.3 kg (278 lb 7.1 oz)  Body mass index is 44.94 kg/m².     Physical Exam  Vitals reviewed.   Constitutional:       Appearance: Normal appearance.   HENT:      Head: Normocephalic and atraumatic.   Eyes:      General: No scleral icterus.     Conjunctiva/sclera: Conjunctivae normal.   Pulmonary:      Effort: Pulmonary effort is normal. No respiratory distress.   Skin:     Coloration: Skin is not jaundiced.      Findings: No erythema.   Psychiatric:         Mood and Affect: Mood normal.         Behavior: Behavior normal.                Significant Labs: All pertinent labs within the past 24 hours have been reviewed.    Significant Imaging: I have reviewed all pertinent imaging results/findings within the past 24 hours.  Assessment/Plan:     * Hip pain  CT evidence of tumor involvement at left hip illiopsoas which may be contributing to pain.  Pain control with PRN medications       MARILOU (acute kidney injury)  Pt reports decreased urine output and decreased PO intake  Pending U/A  S/p IVF in the ED    Hypothyroidism  Continue synthroid      Other constipation  Continue bowel regimen      Anxiety  Continue bupropion      Malignant neoplasm of sigmoid colon  patient of Dr. Pimentel, has progressed through multiple therapies and recently on single agent Debo) with evidence of disease progression and active OP plans to start fruquintinib      VTE Risk Mitigation (From  admission, onward)           Ordered     heparin (porcine) injection 7,500 Units  Every 8 hours         04/20/24 0838     IP VTE HIGH RISK PATIENT  Once         04/20/24 0838     Place sequential compression device  Until discontinued         04/20/24 0359                         The attending portion of this evaluation, treatment, and documentation was performed per Blaise Katz MD via Telemedicine AudioVisual using the secure [employer] software platform with 2 way audio/video. The provider was located off-site and the patient is located in the hospital. The aforementioned video software was utilized to document the relevant history and physical exam            Blaise Katz MD  Department of Hospital Medicine   [facility] - Transplant Stepdown

## 2024-04-20 NOTE — HPI
54F with sigmoid cancer (patient of Dr. Pimentel, has progressed through multiple therapies and recently on single agent Debo) with evidence of disease progression and active OP plans to start fruquintinib who presented to the ED with hip pain. Vitals stable, labs show MARILOU. CT shows  tumor involvement at left hip illiopsoas which may be contributing to pain. Patient admitted for pain management and MARILOU.

## 2024-04-20 NOTE — ASSESSMENT & PLAN NOTE
patient of Dr. Pimentel, has progressed through multiple therapies and recently on single agent Debo) with evidence of disease progression and active OP plans to start fruquintinib

## 2024-04-20 NOTE — ASSESSMENT & PLAN NOTE
Ms. 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 Of note, patient's recent imaging showed evidence of progression. Outpatient plan to start fruquintinib. In ED patient noted to have MARILOU. Imaging showed tumor involvement at left hip iliopsoas. Patient admitted for pain management and MARILOU. Palliative Medicine consulted for symptom management.    Sigmoid cancer/MARILOU/HTN/thyroid problem/other medical problems  - plan per primary team and other speciality consultants    GOC/ACP  - patient decisional and by herself in room today  - no ACP documents uploaded into EMR  - patient follows with Palliative Medicine NP Claudio and Dr. Hill on [facility] already; next appt: 04/23/24  - NOK: patient's children  - today patient verbally stated she would want her middle son, Star Mckinnon, to be decision maker at [phone]  - will follow up at future visits to fill out ACP forms  - goals: improvement in symptoms to be able to go on cruise on Thursday 04/25/24 with friends; also continue life prolonging measures  - code status not discussed today    Cancer related pain  - patient reporting severe pain in left hip; she also has pain at times in abdomen/groin.   - 24 hour OME: 50   - outpatient regimen: oxycodone-apap  mg q4h prn, gabapentin 300 mg qhs, and flexeril 5 mg q8h prn. This was not providing any relief.  - currently only receiving relief from hydromorphone 1 mg IVP PRN  - recommend increasing oxycodone to 15 mg q4h prn first line and continue hydromorphone 1 mg IVP as second line therapy  - start Oxycontin 20 mg BID today, may need up titration prior to discharge  - if okay with oncology team, consider starting low dose steroid such as prednisone 5 mg daily/bid for inflammatory pain given muscle involvement  - patient may also benefit from increase in gabapentin to 300 mg bid as kidney function  allows (estimated Cr clearance today 45 so max safe dose is around 900 mg/day)    Nausea/Anorexia  - patient with increased nausea due to increased pain  - patient with poor appetite constantly  - would benefit from nutrition consult while in the hospital  - continue zofran 4 mg q8h prn and prochlorperazine 10 mg q6h prn    Constipation  - ongoing issue  - uses senna at home with some relief  - continue senna and miralax as ordered    Anxiety/depression/spiritual distress  - patient feeling very overwhelmed with news of progression  - she reports not feeling at peace spiritually  - emotional support provided today  - continue Wellbutrin, lorazepam, and mirtazapine  - consider scheduling mirtazapine 7.5 mg qhs    Above plan of care discussed with primary team, including attending of record, Dr. Rice.

## 2024-04-20 NOTE — ED PROVIDER NOTES
Discussed see Encounter Date: 2024       History     Chief Complaint   Patient presents with    Leg Pain     Left upper thigh pain and swelling. Difficulty ambulating. Denies trauma. On chemo for metastatic colon cancer.       HPI    54 year old with hx of hypothyroidism, Stage IIIC sigmoid colon cancer, ovarian cancer, HTN, on chemotherapy presenting for leg pain.     Patient presents for left groin pain/left hip pain.  She 1st noted the pain 1 month ago when she felt she pulled her hip/groin while ambulating, however in the last week and a half her pain has significantly worsened in her left hip to the point where she was trouble with ambulation.  Her pain is worse with movement, improved with holding still.  She has been taking gabapentin and Tylenol without significant improvement of her symptoms.  She denies recent fall or trauma.    She denies associated chest pain, shortness of breath, nausea, vomiting, fever, chills.    On chart review seen by heme/onc yesterday, on treatment with fruquntinib which was started yesterday and her trifluridine/ tipracil was discontinued.     Review of patient's allergies indicates:   Allergen Reactions    Oxaliplatin Other (See Comments)     Itchy hands, throat closed up, trouble hearing - during infusion    Penicillins Hives and Rash     childhood allergy       Past Medical History:   Diagnosis Date    Anxiety     Depression     FH: ovarian cancer 2020    Hx of psychiatric care     Effexor, Paxil, Lexapro, Zoloft, Wellbutrin, Trazodone Buspar    Hypertension     Hyperthyroidism     Hypothyroid     Kidney calculi     Malignant neoplasm of sigmoid colon 2020    Menorrhagia     Multinodular goiter 2012    Palpitation     Psychiatric problem     Venous insufficiency     Vulvar lesion      Past Surgical History:   Procedure Laterality Date     SECTION, CLASSIC      x3    COLON SURGERY      COLONOSCOPY N/A 2020    Procedure: COLONOSCOPY;   Surgeon: Shane Parker MD;  Location: [facility] ENDO;  Service: Endoscopy;  Laterality: N/A;    COLONOSCOPY N/A 06/21/2022    Procedure: COLONOSCOPY;  Surgeon: Shane Parker MD;  Location: [facility] ENDO;  Service: Endoscopy;  Laterality: N/A;    COLONOSCOPY N/A 05/09/2023    Procedure: COLONOSCOPY;  Surgeon: Shane Parker MD;  Location: [facility] ENDO;  Service: Endoscopy;  Laterality: N/A;  no cecum anastomosis    CYSTOSCOPY W/ URETERAL STENT PLACEMENT Bilateral 03/25/2020    Procedure: CYSTOSCOPY, WITH URETERAL STENT INSERTION;  Surgeon: Claudio Tyson MD;  [facility];  Service: Urology;  Laterality: Bilateral;    ESOPHAGOGASTRODUODENOSCOPY N/A 4/9/2024    Procedure: EGD (ESOPHAGOGASTRODUODENOSCOPY);  Surgeon: Shane Parker MD;  Location: [facility];  Service: Gastroenterology;  Laterality: N/A;    EXAMINATION UNDER ANESTHESIA N/A 01/16/2024    Procedure: Exam under anesthesia-vagina;  Surgeon: Eli Her MD;  Location: [facility] OR;  Service: OB/GYN;  Laterality: N/A;    EXCISION-WIDE LOCAL Left 01/16/2024    Procedure: EXCISION-WIDE LOCAL -  Labia Majora;  Surgeon: Eli Her MD;  Location: [facility] OR;  Service: OB/GYN;  Laterality: Left;  upper left side of labia majora    EXCISIONAL BIOPSY, LYMPH NODE  07/2022    abdominal x 4    INSERTION OF TUNNELED CENTRAL VENOUS CATHETER (CVC) WITH SUBCUTANEOUS PORT N/A 05/01/2020    Procedure: [id number];  Surgeon: [facility] Provider;  Location: [facility] OR 2ND FLR;  Service: Radiology;  Laterality: N/A;  Room 189/Cindy    LAPAROSCOPIC SIGMOIDECTOMY N/A 03/25/2020    Procedure: COLECTOMY, SIGMOID, LAPAROSCOPIC, flex sig, ERAS high;  Surgeon: Silvio Man MD;  Location: [facility] OR 2ND FLR;  Service: Colon and Rectal;  Laterality: N/A;    MOBILIZATION OF SPLENIC FLEXURE  03/25/2020    Procedure: MOBILIZATION, SPLENIC FLEXURE;  Surgeon: Silvio Man MD;  Location: [facility] OR 2ND FLR;  Service: Colon and Rectal;;     TONSILLECTOMY      TOTAL ABDOMINAL HYSTERECTOMY W/ BILATERAL SALPINGOOPHORECTOMY N/A 03/25/2020    Procedure: HYSTERECTOMY, TOTAL, ABDOMINAL, WITH BILATERAL SALPINGO-OOPHORECTOMY;  Surgeon: Keron Brady MD;  Location: [facility] OR [address] [place] [address];  Service: Oncology;  Laterality: N/A;     Family History   Problem Relation Name Age of Onset    Diabetes Mother  65    Heart disease Father      Drug abuse Brother      Drug abuse Brother      Cancer Maternal Aunt          lung cancer    Ovarian cancer Maternal Aunt Fawn     Ovarian cancer Maternal Aunt Adela     Colon cancer Paternal Uncle      Cancer Paternal Uncle      Cancer Maternal Grandmother          stomach cancer- started in ovaries    Ovarian cancer Maternal Grandmother          stomach cancer- started in ovaries    No Known Problems Daughter      Drug abuse Son      Drug abuse Son          clean/sober since 2012    Ovarian cancer Cousin Junie         mother had ovarian cancer    Uterine cancer Neg Hx      Breast cancer Neg Hx      Crohn's disease Neg Hx      Ulcerative colitis Neg Hx       Social History     Tobacco Use    Smoking status: Never    Smokeless tobacco: Never   Substance Use Topics    Alcohol use: Yes     Comment: occasionally- twice monthly    Drug use: Never     Review of Systems  See HPI for pertinent ROS.     Physical Exam     Initial Vitals [04/19/24 2149]   BP Pulse Resp Temp SpO2   133/81 (!) 112 20 98 °F (36.7 °C) 95 %      MAP       --         Physical Exam    Constitutional: She appears well-developed and well-nourished.   HENT:   Head: Normocephalic and atraumatic.   Eyes: Conjunctivae are normal. No scleral icterus.   Neck: No JVD present.   Cardiovascular:  Normal rate, regular rhythm and normal heart sounds.     Exam reveals no gallop and no friction rub.       No murmur heard.  Pulmonary/Chest: Breath sounds normal. No stridor. She has no wheezes. She has no rhonchi. She has no rales.   Abdominal: Abdomen is soft. There is no abdominal  tenderness. There is no rebound and no guarding.   Musculoskeletal:         General: No tenderness or edema.      Comments: Significant tenderness to palpation of the left inguinal/left hip and greater trochanter.  No evidence of bulging when patient increased his intra-abdominal pressure.  She was significant pain with active and passive range of motion of the left hip to include internal and external rotation and significant pain with flexion of the left hip.  No evidence of underlying contusion or abrasion or deformity.    Distal motor and sensation are intact in the left lower extremity, DP and PT pulses +2 bilaterally.     Neurological: She is alert.   Skin: Skin is warm. Capillary refill takes less than 2 seconds.   Psychiatric: She has a normal mood and affect.         ED Course   Procedures  Labs Reviewed   CBC W/ AUTO DIFFERENTIAL - Abnormal; Notable for the following components:       Result Value    MCHC 31.1 (*)     RDW 27.9 (*)     Immature Granulocytes 0.9 (*)     Immature Grans (Abs) 0.05 (*)     Lymph # 0.7 (*)     Lymph % 13.7 (*)     Mono % 15.3 (*)     All other components within normal limits   COMPREHENSIVE METABOLIC PANEL - Abnormal; Notable for the following components:    CO2 16 (*)     BUN 27 (*)     Creatinine 2.6 (*)     Albumin 2.4 (*)     ALT 8 (*)     eGFR 21.0 (*)     All other components within normal limits   SEDIMENTATION RATE - Abnormal; Notable for the following components:    Sed Rate 66 (*)     All other components within normal limits    Narrative:     Add on CRP per Dr. Hopkins @ 02:35 am to order # [id number]   CBC W/ AUTO DIFFERENTIAL - Abnormal; Notable for the following components:    RDW 27.7 (*)     Immature Granulocytes 0.7 (*)     All other components within normal limits   MAGNESIUM   CK   C-REACTIVE PROTEIN   SEDIMENTATION RATE   SARS-COV-2 RNA AMPLIFICATION, QUAL          Imaging Results              US Lower Extremity Veins Left (Final result)  Result time 04/20/24  05:15:47      Final result by Giovanni Spencer MD (04/20/24 05:15:47)                   Impression:      No evidence of deep venous thrombosis in the left lower extremity.    Electronically signed by resident: Andres Mendez  Date:    04/20/2024  Time:    03:13    Electronically signed by: Giovanni Spencer  Date:    04/20/2024  Time:    05:15               Narrative:    EXAMINATION:  US LOWER EXTREMITY VEINS LEFT    CLINICAL HISTORY:  Pain in leg, unspecified    TECHNIQUE:  Duplex and color flow Doppler evaluation and graded compression of the left lower extremity veins was performed.    COMPARISON:  Ultrasound bilateral lower extremity veins 04/16/2024    FINDINGS:  Left thigh veins: The common femoral, femoral, popliteal, and upper greater saphenous veins are patent and free of thrombus. The veins are normally compressible and have normal phasic flow and augmentation response.    Left calf veins: The visualized calf veins are patent.    Contralateral CFV: The contralateral (right) common femoral vein is patent and free of thrombus.    Miscellaneous: Few prominent to mildly enlarged left inguinal lymph nodes similar to priors.                                       CT Hip Without Contrast Left (Final result)  Result time 04/20/24 02:45:25      Final result by Giovanni Spencer MD (04/20/24 02:45:25)                   Impression:      No acute fracture or dislocation of the left hip.    Pre-existing probable intramuscular metastatic lesions, including within the left iliopsoas muscle, which could possibly contribute to pain perceived in or around the region of the left hip (as could perhaps the pre-existing left inguinal lymphadenopathy).  Correlate clinically.    Other observations as detailed in the body of the report.    Electronically signed by resident: Andres Mendez  Date:    04/20/2024  Time:    01:49    Electronically signed by: Giovanni Spencer  Date:    04/20/2024  Time:    02:45               Narrative:     EXAMINATION:  CT HIP WITHOUT CONTRAST LEFT    CLINICAL HISTORY:  Fracture, hip;    TECHNIQUE:  Axial 1.25-mm images of the left hip obtained without intravenous contrast.  Data submitted for coronal and sagittal reformats.  3D reconstructions were performed on a separate workstation.    COMPARISON:  FDG PET-CT 04/10/2024    Left femur radiograph 04/04/2024    FINDINGS:  No acute fracture or dislocation.  No significant joint space narrowing.  Degenerative change in the left SI joint and pubic symphysis.  No significant left hip effusion.    Limited evaluation of the intrapelvic contents significant for postoperative change of the sigmoid colon.    Left inguinal lymphadenopathy similar to recent PET-CT.    There also remain several faint, focal, somewhat annular, intramuscular calcifications, the largest in the left iliopsoas muscle, suspicious for possible metastatic disease.    Mild fatty atrophy of the regional muscles.    Scattered soft tissue stranding in subcutaneous fat, intraperitoneal fat, and retroperitoneal fat, present previously and favored to represent anasarca/edema.  Other infiltrative soft tissue pathology, such as an infectious or neoplastic process, is not fully excluded.                                       CT Lumbar Spine Without Contrast (Final result)  Result time 04/20/24 03:45:56      Final result by Giovanni Spencer MD (04/20/24 03:45:56)                   Impression:      No acute fracture of the lumbar spine.  Multilevel degenerative change as described above.    Sclerotic lesion in the posterior L1 vertebral body similar to recent CT and non hypermetabolic on recent PET-CT.    Other extra-spinal findings as detailed in the body of the report.    Electronically signed by resident: Andres Mendez  Date:    04/20/2024  Time:    01:58    Electronically signed by: Giovanni Spencer  Date:    04/20/2024  Time:    03:45               Narrative:    EXAMINATION:  CT LUMBAR SPINE WITHOUT  CONTRAST    CLINICAL HISTORY:  Low back pain, cancer suspected;    TECHNIQUE:  Low-dose axial, sagittal and coronal reformations are obtained through the lumbar spine.  Contrast was not administered.    COMPARISON:  FDG PET-CT 04/10/2024    CT abdomen pelvis 03/11/2024    FINDINGS:  Alignment: Mild dextroscoliosis and right lateral listhesis L3 on L4.    Vertebrae: Sclerotic lesion in the posterior L1 vertebral body similar to recent CT 03/11/2024 and nonhypermetabolic on recent PET-CT.  No acute fracture.  No suspicious osseous lesions elsewhere.    Discs: Multilevel disc height loss most pronounced at L4-L5 with vacuum disc phenomenon at L5-S1.    Sacroiliac joints: Mild degenerative change.    Degenerative findings:    T12-L1: No neural foraminal narrowing or spinal canal stenosis.    L1-L2: Circumferential disc bulge.  No neural foraminal narrowing or spinal canal stenosis.    L2-L3: Mild circumferential disc bulge.  Mild right neural foraminal narrowing.  No spinal canal stenosis.    L3-L4: Circumferential disc bulge.  Moderate bilateral facet arthropathy.  Mild to moderate right neural foraminal narrowing.  Moderate spinal canal stenosis.    L4-L5: Circumferential disc bulge.  Moderate bilateral facet arthropathy.  Severe right and mild left neural foraminal narrowing.  Moderate spinal canal stenosis.    L5-S1: Posterior disc osteophyte complex.  Moderate bilateral facet arthropathy.  Severe left and moderate right neural foraminal narrowing.  Mild spinal canal stenosis.    Paraspinal muscles & soft tissues: There remains a peripherally calcified nodular or small masslike lesion in the left iliopsoas muscle, suspicious for metastatic disease.    Motion artifacts versus dorsolateral splenic and hepatic subcapsular fluid collections.    High-attenuation, irregularly-shaped, dependent opacity within the gastric lumen, near the cardia, possibly representing antacids or other ingested mineral-containing material.   An ingested foreign body is not excluded.  This finding was not present on 03/11/2024.    Limited retroperitoneal/pelvic evaluation:  Moderate bilateral hydronephrosis similar to priors.  Partially visualized postoperative change of the sigmoid colon.  Retroperitoneal lymphadenopathy.                                       Medications   acetaminophen tablet 1,000 mg (1,000 mg Oral Given 4/20/24 0543)   buPROPion TB24 tablet 150 mg (has no administration in time range)   cinacalcet tablet 30 mg (has no administration in time range)   dicyclomine tablet 20 mg (has no administration in time range)   famotidine tablet 20 mg (has no administration in time range)   gabapentin capsule 300 mg (has no administration in time range)   levothyroxine tablet 350 mcg (350 mcg Oral Given 4/20/24 0544)   LORazepam tablet 1 mg (has no administration in time range)   mirtazapine tablet 7.5 mg (has no administration in time range)   ondansetron disintegrating tablet 4 mg (has no administration in time range)   oxyCODONE immediate release tablet Tab 10 mg (10 mg Oral Given 4/20/24 0640)   prochlorperazine tablet 10 mg (has no administration in time range)   sodium chloride 0.9% flush 10 mL (has no administration in time range)   naloxone 0.4 mg/mL injection 0.02 mg (has no administration in time range)   glucose chewable tablet 16 g (has no administration in time range)   glucose chewable tablet 24 g (has no administration in time range)   glucagon (human recombinant) injection 1 mg (has no administration in time range)   heparin (porcine) injection 5,000 Units (5,000 Units Subcutaneous Given 4/20/24 0544)   HYDROmorphone injection 1 mg (1 mg Intravenous Given 4/20/24 8432)   polyethylene glycol packet 17 g (has no administration in time range)   senna-docusate 8.6-50 mg per tablet 1 tablet (has no administration in time range)   0.9%  NaCl infusion ( Intravenous New Bag 4/20/24 6243)   dextrose 10% bolus 125 mL 125 mL (has no  administration in time range)   dextrose 10% bolus 250 mL 250 mL (has no administration in time range)   magnesium sulfate 2g in water 50mL IVPB (premix) (has no administration in time range)   potassium chloride SA CR tablet 40 mEq (has no administration in time range)   morphine injection 4 mg (4 mg Intravenous Given 4/19/24 1156)   lactated ringers bolus 500 mL (0 mLs Intravenous Stopped 4/20/24 0124)   HYDROmorphone injection 1 mg (1 mg Intravenous Given 4/20/24 0029)     Medical Decision Making  Amount and/or Complexity of Data Reviewed  Labs: ordered.  Radiology: ordered.    Risk  Prescription drug management.  Decision regarding hospitalization.              Attending Attestation:   Physician Attestation Statement for Resident:  As the supervising MD   Physician Attestation Statement: I have personally seen and examined this patient.   I agree with the above history.  -:   As the supervising MD I agree with the above PE.     As the supervising MD I agree with the above treatment, course, plan, and disposition.    I have reviewed and agree with the residents interpretation of the following: lab data and CT scans.  I have reviewed the following: old records at this facility.                                     54-year-old female described as above presenting for acute left groin/hip pain.  On presentation, vital signs are stable, had intermittent desaturation episodes while sleeping, however lungs clear to auscultation.  Physical exam concerning for acute left hip pain, pain with active and passive range of motion of the left hip.  No evidence of acute left inguinal or femoral hernia.  No evidence of acute trauma either.  Workup concerning for uptrending creatinine and decreasing GFR.  CT of the left hip completed showing no evidence of acute or pathologic hip fracture, however intramuscular metastatic lesions in the left iliopsoas muscle noted.  She was given IV morphine, 500 cc LR bolus, she continued to have  pain and was given 1 mg of IV Dilaudid.  No evidence of new lytic lesion in CT of the L-spine.  Ultrasound of the left lower extremity without evidence of acute DVT.  Given her uptrending creatinine and worsening GFR, in addition to severe left hip pain requiring multiple episodes of IV opioid doses, patient requires admission to Heme-Onc for further management.  CRP and ESR pending at time of admission, however also noted to be elevated.    Differential diagnosis also includes left hip fracture, AVN of the femur, septic arthritis of the left hip, bony Mets to the left femur or pelvis.      Clinical Impression:  Final diagnoses:  [M79.606] Leg pain  [N17.9] MARILOU (acute kidney injury) (Primary)          ED Disposition Condition    Admit                 Savana Rodriguez MD  Resident  04/20/24 6443       Nery Hopkins MD  04/20/24 1199

## 2024-04-20 NOTE — ASSESSMENT & PLAN NOTE
CT evidence of tumor involvement at left hip illiopsoas which may be contributing to pain.  Pain control with PRN medications

## 2024-04-20 NOTE — PLAN OF CARE
Pt on unit from ED. AAOx4, Afberile, VSS. NS infusing @ 100 mL/hr. Bed in the lowest setting, call light within reach.

## 2024-04-20 NOTE — ASSESSMENT & PLAN NOTE
Pt reports decreased urine output and decreased PO intake    Recent Labs     04/18/24  1004 04/19/24  2300 04/20/24  0443   BUN 19 27* 28*   CREATININE 2.4* 2.6* 2.8*       Creatinine 2.8 on admit, baseline around 0.9 - 1.0  - Likely pre-renal from dehydration and volume losses superimposed on post renal      Results:  US retroperitoneal: Moderate bilateral hydronephrosis, corresponding to findings on prior CT performed 03/11/2024.       Plan:   - continuous fluids 100 ml/hr  - if BUN/cr continue to uptrend consult urology for further rec regarding US findings.  - Strict I&Os and daily weights   - Avoid nephrotoxic agents such as NSAIDs, gadolinium and IV radiocontrast.  - Renally dose meds to current GFR.  - Maintain MAP > 65.    - Worsened MARILOU after fluids, Urology consulted with retroUS showing moderated flower/hydronephrosis  - Urology recommending NPO midnight for pyelogram and flower/ureteral stenting 4/22  - Palliative on board and further discussion for surgery vs no surgery as pt traveling on cruise 4/25

## 2024-04-20 NOTE — NURSING
Nurses Note -- 4 Eyes      4/20/2024   6:19 AM      Skin assessed during: Admit      [x] No Altered Skin Integrity Present    []Prevention Measures Documented      [] Yes- Altered Skin Integrity Present or Discovered   [] LDA Added if Not in Epic (Describe Wound)   [] New Altered Skin Integrity was Present on Admit and Documented in LDA   [] Wound Image Taken    Wound Care Consulted? No    Attending Nurse:  Kwasi Naranjo RN     Second RN/Staff Member:  Nery Ng RN

## 2024-04-21 NOTE — SUBJECTIVE & OBJECTIVE
Interval History: Patient by herself in room. She reports mild improvement in pain since yesterday. She is able to now stand up and walk to bathroom, which she was not able to do at home. Later in day, patient very upset after being told about possible urologic procedure. Patient distressed about timing of procedure with upcoming trip.     Medications:  Continuous Infusions:  Current Facility-Administered Medications   Medication Dose Route Frequency Last Rate Last Admin    sodium chloride 0.9%   Intravenous Continuous 200 mL/hr at 04/21/24 1027 New Bag at 04/21/24 1027     Scheduled Meds:  Current Facility-Administered Medications   Medication Dose Route Frequency    acetaminophen  1,000 mg Oral Q8H    buPROPion  150 mg Oral Daily    cinacalcet  30 mg Oral Daily with breakfast    famotidine  20 mg Oral Daily    gabapentin  300 mg Oral QHS    heparin (porcine)  7,500 Units Subcutaneous Q8H    levothyroxine  350 mcg Oral Before breakfast    mupirocin   Nasal BID    oxyCODONE  20 mg Oral Q12H    polyethylene glycol  17 g Oral Daily    predniSONE  5 mg Oral Daily    senna-docusate 8.6-50 mg  1 tablet Oral BID    sucralfate  1 g Oral QID     PRN Meds:  Current Facility-Administered Medications:     calcium carbonate, 1,000 mg, Oral, TID PRN    dextrose 10%, 12.5 g, Intravenous, PRN    dextrose 10%, 25 g, Intravenous, PRN    dicyclomine, 20 mg, Oral, BID PRN    glucagon (human recombinant), 1 mg, Intramuscular, PRN    glucose, 16 g, Oral, PRN    glucose, 24 g, Oral, PRN    HYDROmorphone, 1 mg, Intravenous, Q4H PRN    LORazepam, 1 mg, Oral, Q12H PRN    mirtazapine, 7.5 mg, Oral, Nightly PRN    naloxone, 0.02 mg, Intravenous, PRN    ondansetron, 4 mg, Oral, Q8H PRN    oxyCODONE, 15 mg, Oral, Q4H PRN    prochlorperazine, 10 mg, Oral, Q6H PRN    sodium chloride 0.9%, 10 mL, Intravenous, Q12H PRN    Objective:     Vital Signs (Most Recent):  Temp: 97.4 °F (36.3 °C) (04/21/24 1210)  Pulse: 93 (04/21/24 1210)  Resp: 18  (04/21/24 1210)  BP: 124/71 (04/21/24 1210)  SpO2: 96 % (04/21/24 1210) Vital Signs (24h Range):  Temp:  [96.2 °F (35.7 °C)-98.5 °F (36.9 °C)] 97.4 °F (36.3 °C)  Pulse:  [90-96] 93  Resp:  [16-18] 18  SpO2:  [94 %-96 %] 96 %  BP: (105-142)/(58-88) 124/71     Weight: 133.4 kg (294 lb 1.5 oz) (bed scale)  Body mass index is 47.47 kg/m².       Physical Exam  Vitals reviewed.   Constitutional:       General: She is not in acute distress.     Appearance: She is not toxic-appearing or diaphoretic.   HENT:      Head: Normocephalic and atraumatic.      Right Ear: External ear normal.      Left Ear: External ear normal.      Nose: Nose normal.      Mouth/Throat:      Mouth: Mucous membranes are moist.   Eyes:      General: No scleral icterus.        Right eye: No discharge.         Left eye: No discharge.      Extraocular Movements: Extraocular movements intact.   Neck:      Comments: Trachea midline  Cardiovascular:      Rate and Rhythm: Normal rate.   Pulmonary:      Effort: Pulmonary effort is normal. No respiratory distress.   Abdominal:      Palpations: Abdomen is soft.   Musculoskeletal:         General: No deformity or signs of injury.      Cervical back: Normal range of motion.   Skin:     General: Skin is dry.      Coloration: Skin is not jaundiced.      Findings: No rash.   Neurological:      General: No focal deficit present.      Mental Status: She is alert and oriented to person, place, and time.      Cranial Nerves: No cranial nerve deficit.   Psychiatric:         Mood and Affect: Mood is anxious and depressed. Affect is tearful.         Behavior: Behavior normal.         Judgment: Judgment normal.            Review of Symptoms      Symptom Assessment (ESAS 0-10 Scale)  Pain:  8  Dyspnea:  0  Anxiety:  8  Nausea:  0  Depression:  8  Anorexia:  5  Fatigue:  6  Insomnia:  0  Restlessness:  0  Agitation:  0     CAM / Delirium:  Negative  Constipation:  Positive  Diarrhea:  Negative      Bowel Management Plan  (BMP):  Yes      Pain Assessment:  OME in 24 hours:  125  Location(s): leg    Leg       Location: left        Quality: Aching, burning, sharp and pressure-like        Quantity: 8/10 in intensity        Chronicity: Onset 1 (greater than) week(s) ago, unchanged        Aggravating Factors: Activity and walking        Alleviating Factors: Opiates        Associated Symptoms: None    Modified Ramona Scale:  0    ECOG Performance Status stGstrstastdstest:st st1st Living Arrangements:  Lives with family and Lives in home    Psychosocial/Cultural:   See Palliative Psychosocial Note: No  Social Issues Identified: Coping deficit pt/family and Mental Health  Bereavement Risk: No  Caregiver Needs Discussed. Caregiver Distress: No: n/a  Cultural: patient enjoys shopping; she has three children (2 sons and 1 daughter). Has good support with her mother and close friends.   **Primary  to Follow**  Palliative Care  Consult: No    Spiritual:  F - Marilyn and Belief:  Yes  I - Importance:  Yes  C - Community:  Has   A - Address in Care:  In distress        Advance Care Planning   Advance Directives:   Living Will: No    LaPOST: No    Do Not Resuscitate Status: No    Medical Power of : No        Oral Declaration: Yes  Agent's Name:  Star Mckinnon   Agent's Contact Number:  [phone]    Decision Making:  Patient answered questions  Goals of Care: What is most important right now is to focus on remaining as independent as possible, symptom/pain control, extending life as long as possible, even it it means sacrificing quality. Accordingly, we have decided that the best plan to meet the patient's goals includes continuing with treatment.         Significant Labs: All pertinent labs within the past 24 hours have been reviewed.  CBC:   Recent Labs   Lab 04/21/24  0547   WBC 5.54   HGB 11.6*   HCT 37.9   MCV 94        BMP:  Recent Labs   Lab 04/21/24  0547   GLU 98   *   K 4.3      CO2 13*   BUN 36*  "  CREATININE 3.5*   CALCIUM 9.5   MG 1.8     LFT:  Lab Results   Component Value Date    AST 12 04/21/2024    ALKPHOS 128 04/21/2024    BILITOT 0.3 04/21/2024     Albumin:   Albumin   Date Value Ref Range Status   04/21/2024 2.4 (L) 3.5 - 5.2 g/dL Final     Protein:   Total Protein   Date Value Ref Range Status   04/21/2024 6.0 6.0 - 8.4 g/dL Final     Lactic acid:   No results found for: "LACTATE"    Significant Imaging: I have reviewed all pertinent imaging results/findings within the past 24 hours.    04/20/2024 US retroperitoneal: "Moderate bilateral hydronephrosis, corresponding to findings on prior CT performed 03/11/2024. At least 1 nonobstructing right renal calculus is noted.  Additional smaller no calculi seen on prior CT are not well characterized. Nonspecific increased attenuation dependently within the urinary bladder.  No definite internal vascularity on single provided image.  Findings are nonspecific and could relate to debris and/or hemorrhage.  Soft tissue mass would be difficult to exclude on limited images.  Cross-sectional imaging could be considered for further characterization."   "

## 2024-04-21 NOTE — HPI
54yoF with sigmoid cancer (multiple prior treatments with surgery and chemotherapy, has had lap colectomy, MARCIAL/BSO, lysis of adhesions) with evidence of disease progression and active OP plans to start fruquintinib who presented to the ED with hip pain. Vitals stable, labs show MARILOU. CT shows  tumor involvement at left hip illiopsoas which may be contributing to pain. Patient admitted for pain management and MARILOU.     On chart review patient Cr baseline likely 0.6- 1.0, Cr in 03/2024 was 1.0, and on labs since has been rising with an acute rise over the past few days up to 3.5 today. Of note patient had unremarkable CT in 01/27/2024 with no hydronephrosis and mild nephrolithiasis. No hydronephrosis on US 2/13/24. On subsequent CT on 3/11/24 patient did have mild bilateral hydroureteronephrosis without obstructing calculi. On personal review of PET from 4/10/24 there appears to be increasing hydronephrosis and potentially a small calcification in the right ureter which may represent a ureteral calculus. Patient also had significant hydronephrosis on US yesterday.     Patient seen today, vitals stable. PVR 0. UA non-concerning for infection but contaminated. Patient denies any associated symptoms, no n/v/f/c/flank pain/hematuria/dysuria. Patient endorses being told she has a history of urolithiasis but has never had any intervention for it. Denies any history of prior abdominal or pelvic radiation. Urology consulted for MARILOU.

## 2024-04-21 NOTE — SUBJECTIVE & OBJECTIVE
Interval History:  Worsened MARILOU after fluids this AM, urology consulted for bilateral hydro and recommending repeat UA today with pyelogram and ureteral stenting for 4/22, pending further discussions as patient wants to go on cruise this Thursday.    Oncology Treatment Plan:   [No matching plan found]    Medications:  Continuous Infusions:  Current Facility-Administered Medications   Medication Dose Route Frequency Last Rate Last Admin    sodium chloride 0.9%   Intravenous Continuous 200 mL/hr at 04/21/24 1027 New Bag at 04/21/24 1027     Scheduled Meds:  Current Facility-Administered Medications   Medication Dose Route Frequency    acetaminophen  1,000 mg Oral Q8H    buPROPion  150 mg Oral Daily    cinacalcet  30 mg Oral Daily with breakfast    famotidine  20 mg Oral Daily    gabapentin  300 mg Oral QHS    heparin (porcine)  7,500 Units Subcutaneous Q8H    levothyroxine  350 mcg Oral Before breakfast    mupirocin   Nasal BID    oxyCODONE  20 mg Oral Q12H    polyethylene glycol  17 g Oral Daily    predniSONE  5 mg Oral Daily    senna-docusate 8.6-50 mg  1 tablet Oral BID    sucralfate  1 g Oral QID     PRN Meds:  Current Facility-Administered Medications:     calcium carbonate, 1,000 mg, Oral, TID PRN    dextrose 10%, 12.5 g, Intravenous, PRN    dextrose 10%, 25 g, Intravenous, PRN    dicyclomine, 20 mg, Oral, BID PRN    glucagon (human recombinant), 1 mg, Intramuscular, PRN    glucose, 16 g, Oral, PRN    glucose, 24 g, Oral, PRN    HYDROmorphone, 1 mg, Intravenous, Q4H PRN    LORazepam, 1 mg, Oral, Q12H PRN    mirtazapine, 7.5 mg, Oral, Nightly PRN    naloxone, 0.02 mg, Intravenous, PRN    ondansetron, 4 mg, Oral, Q8H PRN    oxyCODONE, 15 mg, Oral, Q4H PRN    prochlorperazine, 10 mg, Oral, Q6H PRN    sodium chloride 0.9%, 10 mL, Intravenous, Q12H PRN     Review of Systems  Objective:     Vital Signs (Most Recent):  Temp: 97.4 °F (36.3 °C) (04/21/24 1210)  Pulse: 93 (04/21/24 1210)  Resp: 18 (04/21/24 1210)  BP:  124/71 (04/21/24 1210)  SpO2: 96 % (04/21/24 1210) Vital Signs (24h Range):  Temp:  [96.2 °F (35.7 °C)-98.5 °F (36.9 °C)] 97.4 °F (36.3 °C)  Pulse:  [90-96] 93  Resp:  [16-18] 18  SpO2:  [94 %-96 %] 96 %  BP: (105-142)/(58-88) 124/71     Weight: 133.4 kg (294 lb 1.5 oz) (bed scale)  Body mass index is 47.47 kg/m².  Body surface area is 2.49 meters squared.      Intake/Output Summary (Last 24 hours) at 4/21/2024 1250  Last data filed at 4/21/2024 0532  Gross per 24 hour   Intake 1271 ml   Output 100 ml   Net 1171 ml        Physical Exam  Vitals reviewed.   Constitutional:       General: She is not in acute distress.     Appearance: She is not toxic-appearing or diaphoretic.   HENT:      Head: Normocephalic and atraumatic.      Right Ear: External ear normal.      Left Ear: External ear normal.      Nose: Nose normal.      Mouth/Throat:      Mouth: Mucous membranes are moist.   Eyes:      General: No scleral icterus.        Right eye: No discharge.         Left eye: No discharge.      Extraocular Movements: Extraocular movements intact.   Neck:      Comments: Trachea midline  Cardiovascular:      Rate and Rhythm: Normal rate.   Pulmonary:      Effort: Pulmonary effort is normal. No respiratory distress.   Abdominal:      Palpations: Abdomen is soft.   Musculoskeletal:         General: No deformity or signs of injury.      Cervical back: Normal range of motion.   Skin:     General: Skin is dry.      Coloration: Skin is not jaundiced.      Findings: No rash.   Neurological:      General: No focal deficit present.      Mental Status: She is alert and oriented to person, place, and time.      Cranial Nerves: No cranial nerve deficit.   Psychiatric:         Mood and Affect: Mood is anxious and depressed. Affect is tearful.         Behavior: Behavior normal.         Judgment: Judgment normal.          Significant Labs:   CBC:   Recent Labs   Lab 04/19/24  2300 04/20/24  0443 04/21/24  0547   WBC 5.31 5.48 5.54   HGB 12.6  12.9 11.6*   HCT 40.5 40.3 37.9    304 297    and CMP:   Recent Labs   Lab 04/19/24  2300 04/20/24  0443 04/21/24  0547    136 132*   K 3.7 3.6 4.3    110 109   CO2 16* 15* 13*    97 98   BUN 27* 28* 36*   CREATININE 2.6* 2.8* 3.5*   CALCIUM 9.5 9.5 9.5   PROT 6.2 6.2 6.0   ALBUMIN 2.4* 2.5* 2.4*   BILITOT 0.3 0.3 0.3   ALKPHOS 133 136* 128   AST 13 15 12   ALT 8* 8* 8*   ANIONGAP 12 11 10       Diagnostic Results:  None

## 2024-04-21 NOTE — PROGRESS NOTES
[facility] - Transplant Stepdown  Hematology/Oncology  Progress Note    Patient Name: Gail Mckinnon  Admission Date: 4/19/2024  Hospital Length of Stay: 1 days  Code Status: Full Code     Subjective:     HPI:  54F with sigmoid cancer (patient of Dr. Pimentel, has progressed through multiple therapies and recently on single agent Debo) with evidence of disease progression and active OP plans to start fruquintinib who presented to the ED with hip pain. Vitals stable, labs show MARILOU. CT shows tumor involvement at left hip illiopsoas which may be contributing to pain. Patient admitted for pain management and MARILOU.     Interval History:  Worsened MARILOU after fluids this AM, urology consulted for bilateral hydro and recommending repeat UA today with pyelogram and ureteral stenting for 4/22, pending further discussions as patient wants to go on cruise this Thursday.    Oncology Treatment Plan:   [No matching plan found]    Medications:  Continuous Infusions:  Current Facility-Administered Medications   Medication Dose Route Frequency Last Rate Last Admin    sodium chloride 0.9%   Intravenous Continuous 200 mL/hr at 04/21/24 1027 New Bag at 04/21/24 1027     Scheduled Meds:  Current Facility-Administered Medications   Medication Dose Route Frequency    acetaminophen  1,000 mg Oral Q8H    buPROPion  150 mg Oral Daily    cinacalcet  30 mg Oral Daily with breakfast    famotidine  20 mg Oral Daily    gabapentin  300 mg Oral QHS    heparin (porcine)  7,500 Units Subcutaneous Q8H    levothyroxine  350 mcg Oral Before breakfast    mupirocin   Nasal BID    oxyCODONE  20 mg Oral Q12H    polyethylene glycol  17 g Oral Daily    predniSONE  5 mg Oral Daily    senna-docusate 8.6-50 mg  1 tablet Oral BID    sucralfate  1 g Oral QID     PRN Meds:  Current Facility-Administered Medications:     calcium carbonate, 1,000 mg, Oral, TID PRN    dextrose 10%, 12.5 g, Intravenous, PRN    dextrose 10%, 25 g, Intravenous, PRN    dicyclomine, 20 mg,  Oral, BID PRN    glucagon (human recombinant), 1 mg, Intramuscular, PRN    glucose, 16 g, Oral, PRN    glucose, 24 g, Oral, PRN    HYDROmorphone, 1 mg, Intravenous, Q4H PRN    LORazepam, 1 mg, Oral, Q12H PRN    mirtazapine, 7.5 mg, Oral, Nightly PRN    naloxone, 0.02 mg, Intravenous, PRN    ondansetron, 4 mg, Oral, Q8H PRN    oxyCODONE, 15 mg, Oral, Q4H PRN    prochlorperazine, 10 mg, Oral, Q6H PRN    sodium chloride 0.9%, 10 mL, Intravenous, Q12H PRN     Review of Systems  Objective:     Vital Signs (Most Recent):  Temp: 97.4 °F (36.3 °C) (04/21/24 1210)  Pulse: 93 (04/21/24 1210)  Resp: 18 (04/21/24 1210)  BP: 124/71 (04/21/24 1210)  SpO2: 96 % (04/21/24 1210) Vital Signs (24h Range):  Temp:  [96.2 °F (35.7 °C)-98.5 °F (36.9 °C)] 97.4 °F (36.3 °C)  Pulse:  [90-96] 93  Resp:  [16-18] 18  SpO2:  [94 %-96 %] 96 %  BP: (105-142)/(58-88) 124/71     Weight: 133.4 kg (294 lb 1.5 oz) (bed scale)  Body mass index is 47.47 kg/m².  Body surface area is 2.49 meters squared.      Intake/Output Summary (Last 24 hours) at 4/21/2024 1250  Last data filed at 4/21/2024 0532  Gross per 24 hour   Intake 1271 ml   Output 100 ml   Net 1171 ml        Physical Exam  Vitals reviewed.   Constitutional:       General: She is not in acute distress.     Appearance: She is not toxic-appearing or diaphoretic.   HENT:      Head: Normocephalic and atraumatic.      Right Ear: External ear normal.      Left Ear: External ear normal.      Nose: Nose normal.      Mouth/Throat:      Mouth: Mucous membranes are moist.   Eyes:      General: No scleral icterus.        Right eye: No discharge.         Left eye: No discharge.      Extraocular Movements: Extraocular movements intact.   Neck:      Comments: Trachea midline  Cardiovascular:      Rate and Rhythm: Normal rate.   Pulmonary:      Effort: Pulmonary effort is normal. No respiratory distress.   Abdominal:      Palpations: Abdomen is soft.   Musculoskeletal:         General: No deformity or signs of  injury.      Cervical back: Normal range of motion.   Skin:     General: Skin is dry.      Coloration: Skin is not jaundiced.      Findings: No rash.   Neurological:      General: No focal deficit present.      Mental Status: She is alert and oriented to person, place, and time.      Cranial Nerves: No cranial nerve deficit.   Psychiatric:         Mood and Affect: Mood is anxious and depressed. Affect is tearful.         Behavior: Behavior normal.         Judgment: Judgment normal.          Significant Labs:   CBC:   Recent Labs   Lab 04/19/24  2300 04/20/24  0443 04/21/24  0547   WBC 5.31 5.48 5.54   HGB 12.6 12.9 11.6*   HCT 40.5 40.3 37.9    304 297    and CMP:   Recent Labs   Lab 04/19/24 2300 04/20/24  0443 04/21/24  0547    136 132*   K 3.7 3.6 4.3    110 109   CO2 16* 15* 13*    97 98   BUN 27* 28* 36*   CREATININE 2.6* 2.8* 3.5*   CALCIUM 9.5 9.5 9.5   PROT 6.2 6.2 6.0   ALBUMIN 2.4* 2.5* 2.4*   BILITOT 0.3 0.3 0.3   ALKPHOS 133 136* 128   AST 13 15 12   ALT 8* 8* 8*   ANIONGAP 12 11 10       Diagnostic Results:  None  Assessment/Plan:     * Hip pain  - CT evidence of tumor involvement at left hip illiopsoas which may be contributing to pain.      Plan:  - per palliative medicine,  - recommend increasing oxycodone to 15 mg q4h prn first line and continue hydromorphone 1 mg IVP as second line therapy  - Oxycontin 20 mg BID , may need up titration prior to discharge  - if okay with oncology team, consider starting low dose steroid such as prednisone 5 mg daily/bid for inflammatory pain given muscle involvement  - patient may also benefit from increase in gabapentin to 300 mg bid as kidney function allows (estimated Cr clearance today 45 so max safe dose is around 900 mg/day)    MARILOU (acute kidney injury)  Pt reports decreased urine output and decreased PO intake    Recent Labs     04/18/24  1004 04/19/24 2300 04/20/24  0443   BUN 19 27* 28*   CREATININE 2.4* 2.6* 2.8*        Creatinine 2.8 on admit, baseline around 0.9 - 1.0  - Likely pre-renal from dehydration and volume losses superimposed on post renal      Results:  US retroperitoneal: Moderate bilateral hydronephrosis, corresponding to findings on prior CT performed 03/11/2024.       Plan:   - continuous fluids 100 ml/hr  - if BUN/cr continue to uptrend consult urology for further rec regarding US findings.  - Strict I&Os and daily weights   - Avoid nephrotoxic agents such as NSAIDs, gadolinium and IV radiocontrast.  - Renally dose meds to current GFR.  - Maintain MAP > 65.    - Worsened MARILOU after fluids, Urology consulted with retroUS showing moderated flower/hydronephrosis  - Urology recommending NPO midnight for pyelogram and flower/ureteral stenting 4/22  - Palliative on board and further discussion for surgery vs no surgery as pt traveling on cruise 4/25      Hypothyroidism  - continue home synthroid     Other constipation  Senna BID    Anxiety  - home bupropion     Malignant neoplasm of sigmoid colon  Patient of Dr. Pimentel, has progressed through multiple therapies and recently on single agent Debo) with evidence of disease progression and active OP plans to start fruquintinib         Shailesh Mckee MD  Hematology/Oncology  [facility] - Transplant Stepdown

## 2024-04-21 NOTE — PROGRESS NOTES
[facility] - Transplant Stepdown  Palliative Medicine  Progress Note    Patient Name: Gail Mckinnon  MRN: [id number]  Admission Date: 4/19/2024  Hospital Length of Stay: 1 days  Code Status: Full Code   Attending Provider: Virgie Rice MD  Consulting Provider: Montserrat Villegas MD  Primary Care Physician: Marah Santo MD  Principal Problem:Hip pain    Patient information was obtained from patient, past medical records, primary team, and current medical record .      Assessment/Plan:     Palliative Care  Palliative care encounter  Ms. Mckinnon is a 53 y/o F with HTN, depression/anxiety, thyroid problem, and sigmoid carcinoma (s/p multiple lines of treatment) presented with worsening left hip pain. Of note, patient's recent imaging showed evidence of progression. Outpatient plan to start fruquintinib. In ED patient noted to have MARILOU. Imaging showed tumor involvement at left hip iliopsoas. Patient admitted for pain management and MARILOU. Palliative Medicine consulted for symptom management.    Sigmoid cancer/MARILOU/HTN/thyroid problem/other medical problems  - plan per primary team and other speciality consultants  - agree with urology consult    GOC/ACP  - patient decisional and by herself in room today  - no ACP documents uploaded into EMR  - patient follows with Palliative Medicine NP Clauido and Dr. Hill on [facility] already; next appt: 04/23/24  - NOK: patient's children  - On 04/20/2024 patient verbally stated she would want her middle son, Star Mckinnon, to be decision maker at [phone]  - will follow up at future visits to fill out ACP forms  - goals: improvement in symptoms to be able to go on cruise on Thursday 04/25/24 with friends; also continue life prolonging measures  - code status not discussed today    Cancer related pain  - patient reporting severe pain in left hip; she also has pain at times in abdomen/groin.   - 24 hour OME: 125   - outpatient regimen: oxycodone-apap  mg q4h  prn, gabapentin 300 mg qhs, and flexeril 5 mg q8h prn. This was not providing any relief.  - currently only receiving relief from hydromorphone 1 mg IVP PRN  - recommend continue oxycodone to 15 mg q4h prn first line and continue hydromorphone 1 mg IVP as second line therapy  - continue Oxycontin 20 mg BID today, may need up titration prior to discharge  - if okay with oncology team, continue low dose steroid such as prednisone 5 mg daily/bid for inflammatory pain given muscle involvement  - patient may also benefit from increase in gabapentin to 300 mg bid as kidney function allows    Nausea/Anorexia  - patient with increased nausea due to increased pain  - patient with poor appetite constantly  - would benefit from nutrition consult while in the hospital  - continue zofran 4 mg q8h prn and prochlorperazine 10 mg q6h prn    Constipation  - ongoing issue  - uses senna at home with some relief  - continue senna and miralax as ordered    Anxiety/depression/spiritual distress  - patient feeling very overwhelmed with news of progression  - on 4/21 patient spoke of possible urologic surgery (stents vs nephrostomy tubes). Patient feeling very overwhelmed by news of additional issues. Patient upset with ongoing bad news as well as possibility of not being able to make trip with her friends. Patient spoke about how she feels that this is her last trip she will be able to make, and she wants to be able to enjoy herself and the time she has left.   - she reports not feeling at peace spiritually  - emotional support provided today  - discussed with patient about calling her  for additional support today  - continue Wellbutrin, lorazepam, and mirtazapine  - recommend scheduling mirtazapine 7.5 mg qhs    Above plan of care discussed with primary team, including attending of record, Dr. Rice.        I will follow-up with patient. Please contact us if you have any additional questions.    Subjective:     Chief Complaint:    Chief Complaint   Patient presents with    Leg Pain     Left upper thigh pain and swelling. Difficulty ambulating. Denies trauma. On chemo for metastatic colon cancer.         HPI:   Ms. Mckinnon is a 53 y/o F with HTN, depression/anxiety, thyroid problem, and sigmoid carcinoma (s/p multiple lines of treatment) presented with worsening left hip pain. Of note, patient's recent imaging showed evidence of progression. Outpatient plan to start fruquintinib. In ED patient noted to have MARILOU. Imaging showed tumor involvement at left hip iliopsoas. Patient admitted for pain management and MARILOU. Palliative Medicine consulted for symptom management.    Hospital Course:  No notes on file    Interval History: Patient by herself in room. She reports mild improvement in pain since yesterday. She is able to now stand up and walk to bathroom, which she was not able to do at home. Later in day, patient very upset after being told about possible urologic procedure. Patient distressed about timing of procedure with upcoming trip.     Medications:  Continuous Infusions:  Current Facility-Administered Medications   Medication Dose Route Frequency Last Rate Last Admin    sodium chloride 0.9%   Intravenous Continuous 200 mL/hr at 04/21/24 1027 New Bag at 04/21/24 1027     Scheduled Meds:  Current Facility-Administered Medications   Medication Dose Route Frequency    acetaminophen  1,000 mg Oral Q8H    buPROPion  150 mg Oral Daily    cinacalcet  30 mg Oral Daily with breakfast    famotidine  20 mg Oral Daily    gabapentin  300 mg Oral QHS    heparin (porcine)  7,500 Units Subcutaneous Q8H    levothyroxine  350 mcg Oral Before breakfast    mupirocin   Nasal BID    oxyCODONE  20 mg Oral Q12H    polyethylene glycol  17 g Oral Daily    predniSONE  5 mg Oral Daily    senna-docusate 8.6-50 mg  1 tablet Oral BID    sucralfate  1 g Oral QID     PRN Meds:  Current Facility-Administered Medications:     calcium carbonate, 1,000 mg, Oral, TID PRN     dextrose 10%, 12.5 g, Intravenous, PRN    dextrose 10%, 25 g, Intravenous, PRN    dicyclomine, 20 mg, Oral, BID PRN    glucagon (human recombinant), 1 mg, Intramuscular, PRN    glucose, 16 g, Oral, PRN    glucose, 24 g, Oral, PRN    HYDROmorphone, 1 mg, Intravenous, Q4H PRN    LORazepam, 1 mg, Oral, Q12H PRN    mirtazapine, 7.5 mg, Oral, Nightly PRN    naloxone, 0.02 mg, Intravenous, PRN    ondansetron, 4 mg, Oral, Q8H PRN    oxyCODONE, 15 mg, Oral, Q4H PRN    prochlorperazine, 10 mg, Oral, Q6H PRN    sodium chloride 0.9%, 10 mL, Intravenous, Q12H PRN    Objective:     Vital Signs (Most Recent):  Temp: 97.4 °F (36.3 °C) (04/21/24 1210)  Pulse: 93 (04/21/24 1210)  Resp: 18 (04/21/24 1210)  BP: 124/71 (04/21/24 1210)  SpO2: 96 % (04/21/24 1210) Vital Signs (24h Range):  Temp:  [96.2 °F (35.7 °C)-98.5 °F (36.9 °C)] 97.4 °F (36.3 °C)  Pulse:  [90-96] 93  Resp:  [16-18] 18  SpO2:  [94 %-96 %] 96 %  BP: (105-142)/(58-88) 124/71     Weight: 133.4 kg (294 lb 1.5 oz) (bed scale)  Body mass index is 47.47 kg/m².       Physical Exam  Vitals reviewed.   Constitutional:       General: She is not in acute distress.     Appearance: She is not toxic-appearing or diaphoretic.   HENT:      Head: Normocephalic and atraumatic.      Right Ear: External ear normal.      Left Ear: External ear normal.      Nose: Nose normal.      Mouth/Throat:      Mouth: Mucous membranes are moist.   Eyes:      General: No scleral icterus.        Right eye: No discharge.         Left eye: No discharge.      Extraocular Movements: Extraocular movements intact.   Neck:      Comments: Trachea midline  Cardiovascular:      Rate and Rhythm: Normal rate.   Pulmonary:      Effort: Pulmonary effort is normal. No respiratory distress.   Abdominal:      Palpations: Abdomen is soft.   Musculoskeletal:         General: No deformity or signs of injury.      Cervical back: Normal range of motion.   Skin:     General: Skin is dry.      Coloration: Skin is not  jaundiced.      Findings: No rash.   Neurological:      General: No focal deficit present.      Mental Status: She is alert and oriented to person, place, and time.      Cranial Nerves: No cranial nerve deficit.   Psychiatric:         Mood and Affect: Mood is anxious and depressed. Affect is tearful.         Behavior: Behavior normal.         Judgment: Judgment normal.            Review of Symptoms      Symptom Assessment (ESAS 0-10 Scale)  Pain:  8  Dyspnea:  0  Anxiety:  8  Nausea:  0  Depression:  8  Anorexia:  5  Fatigue:  6  Insomnia:  0  Restlessness:  0  Agitation:  0     CAM / Delirium:  Negative  Constipation:  Positive  Diarrhea:  Negative      Bowel Management Plan (BMP):  Yes      Pain Assessment:  OME in 24 hours:  125  Location(s): leg    Leg       Location: left        Quality: Aching, burning, sharp and pressure-like        Quantity: 8/10 in intensity        Chronicity: Onset 1 (greater than) week(s) ago, unchanged        Aggravating Factors: Activity and walking        Alleviating Factors: Opiates        Associated Symptoms: None    Modified Ramona Scale:  0    ECOG Performance Status stGstrstastdstest:st st1st Living Arrangements:  Lives with family and Lives in home    Psychosocial/Cultural:   See Palliative Psychosocial Note: No  Social Issues Identified: Coping deficit pt/family and Mental Health  Bereavement Risk: No  Caregiver Needs Discussed. Caregiver Distress: No: n/a  Cultural: patient enjoys shopping; she has three children (2 sons and 1 daughter). Has good support with her mother and close friends.   **Primary  to Follow**  Palliative Care  Consult: No    Spiritual:  F - Marilyn and Belief:  Yes  I - Importance:  Yes  C - Community:  Has   A - Address in Care:  In distress        Advance Care Planning  Advance Directives:   Living Will: No    LaPOST: No    Do Not Resuscitate Status: No    Medical Power of : No        Oral Declaration: Yes  Agent's Name:  Star  "Mckinnon   Agent's Contact Number:  [phone]    Decision Making:  Patient answered questions  Goals of Care: What is most important right now is to focus on remaining as independent as possible, symptom/pain control, extending life as long as possible, even it it means sacrificing quality. Accordingly, we have decided that the best plan to meet the patient's goals includes continuing with treatment.         Significant Labs: All pertinent labs within the past 24 hours have been reviewed.  CBC:   Recent Labs   Lab 04/21/24  0547   WBC 5.54   HGB 11.6*   HCT 37.9   MCV 94        BMP:  Recent Labs   Lab 04/21/24 0547   GLU 98   *   K 4.3      CO2 13*   BUN 36*   CREATININE 3.5*   CALCIUM 9.5   MG 1.8     LFT:  Lab Results   Component Value Date    AST 12 04/21/2024    ALKPHOS 128 04/21/2024    BILITOT 0.3 04/21/2024     Albumin:   Albumin   Date Value Ref Range Status   04/21/2024 2.4 (L) 3.5 - 5.2 g/dL Final     Protein:   Total Protein   Date Value Ref Range Status   04/21/2024 6.0 6.0 - 8.4 g/dL Final     Lactic acid:   No results found for: "LACTATE"    Significant Imaging: I have reviewed all pertinent imaging results/findings within the past 24 hours.    04/20/2024 US retroperitoneal: "Moderate bilateral hydronephrosis, corresponding to findings on prior CT performed 03/11/2024. At least 1 nonobstructing right renal calculus is noted.  Additional smaller no calculi seen on prior CT are not well characterized. Nonspecific increased attenuation dependently within the urinary bladder.  No definite internal vascularity on single provided image.  Findings are nonspecific and could relate to debris and/or hemorrhage.  Soft tissue mass would be difficult to exclude on limited images.  Cross-sectional imaging could be considered for further characterization."     Montserrat Villegas MD  Palliative Medicine  [facility] - Transplant Stepdown                "

## 2024-04-21 NOTE — SUBJECTIVE & OBJECTIVE
Past Medical History:   Diagnosis Date    Anxiety     Depression     FH: ovarian cancer 2020    Hx of psychiatric care     Effexor, Paxil, Lexapro, Zoloft, Wellbutrin, Trazodone Buspar    Hypertension     Hyperthyroidism     Hypothyroid     Kidney calculi     Malignant neoplasm of sigmoid colon 2020    Menorrhagia     Multinodular goiter 2012    Palpitation     Psychiatric problem     Venous insufficiency     Vulvar lesion        Past Surgical History:   Procedure Laterality Date     SECTION, CLASSIC      x3    COLON SURGERY      COLONOSCOPY N/A 2020    Procedure: COLONOSCOPY;  Surgeon: Shane Parker MD;  Location: [facility];  Service: Endoscopy;  Laterality: N/A;    COLONOSCOPY N/A 2022    Procedure: COLONOSCOPY;  Surgeon: Shane Parker MD;  Location: [facility];  Service: Endoscopy;  Laterality: N/A;    COLONOSCOPY N/A 2023    Procedure: COLONOSCOPY;  Surgeon: Shane Parker MD;  Location: [facility];  Service: Endoscopy;  Laterality: N/A;  no cecum anastomosis    CYSTOSCOPY W/ URETERAL STENT PLACEMENT Bilateral 2020    Procedure: CYSTOSCOPY, WITH URETERAL STENT INSERTION;  Surgeon: Claudio Tyson MD;  Location: [facility] OR [address] [place] [address];  Service: Urology;  Laterality: Bilateral;    ESOPHAGOGASTRODUODENOSCOPY N/A 2024    Procedure: EGD (ESOPHAGOGASTRODUODENOSCOPY);  Surgeon: Shane Parker MD;  Location: [facility];  Service: Gastroenterology;  Laterality: N/A;    EXAMINATION UNDER ANESTHESIA N/A 2024    Procedure: Exam under anesthesia-vagina;  Surgeon: Eli Her MD;  Location: [facility] OR;  Service: OB/GYN;  Laterality: N/A;    EXCISION-WIDE LOCAL Left 2024    Procedure: EXCISION-WIDE LOCAL -  Labia Majora;  Surgeon: Eli Her MD;  Location: [facility] OR;  Service: OB/GYN;  Laterality: Left;  upper left side of labia majora    EXCISIONAL BIOPSY, LYMPH NODE  2022    abdominal x 4    INSERTION OF TUNNELED CENTRAL VENOUS CATHETER  (CVC) WITH SUBCUTANEOUS PORT N/A 05/01/2020    Procedure: [id number];  Surgeon: Stephen Diagnostic Provider;  Location: [facility] OR 2ND FLR;  Service: Radiology;  Laterality: N/A;  Room 189/Cindy    LAPAROSCOPIC SIGMOIDECTOMY N/A 03/25/2020    Procedure: COLECTOMY, SIGMOID, LAPAROSCOPIC, flex sig, ERAS high;  Surgeon: Silvio Man MD;  Location: [facility] OR 2ND FLR;  Service: Colon and Rectal;  Laterality: N/A;    MOBILIZATION OF SPLENIC FLEXURE  03/25/2020    Procedure: MOBILIZATION, SPLENIC FLEXURE;  Surgeon: Silvio Man MD;  Location: [facility] OR 2ND FLR;  Service: Colon and Rectal;;    TONSILLECTOMY      TOTAL ABDOMINAL HYSTERECTOMY W/ BILATERAL SALPINGOOPHORECTOMY N/A 03/25/2020    Procedure: HYSTERECTOMY, TOTAL, ABDOMINAL, WITH BILATERAL SALPINGO-OOPHORECTOMY;  Surgeon: Keron Brady MD;  Location: [facility] OR 2ND FLR;  Service: Oncology;  Laterality: N/A;       Review of patient's allergies indicates:   Allergen Reactions    Oxaliplatin Other (See Comments)     Itchy hands, throat closed up, trouble hearing - during infusion    Penicillins Hives and Rash     childhood allergy         Family History       Problem Relation (Age of Onset)    Cancer Maternal Aunt, Paternal Uncle, Maternal Grandmother    Colon cancer Paternal Uncle    Diabetes Mother (65)    Drug abuse Brother, Brother, Son, Son    Heart disease Father    No Known Problems Daughter    Ovarian cancer Maternal Aunt, Maternal Aunt, Maternal Grandmother, Cousin            Tobacco Use    Smoking status: Never    Smokeless tobacco: Never   Substance and Sexual Activity    Alcohol use: Yes     Comment: occasionally- twice monthly    Drug use: Never    Sexual activity: Yes     Partners: Male     Birth control/protection: See Surgical Hx     ROS: see HPI      Objective:     Temp:  [96.2 °F (35.7 °C)-98.5 °F (36.9 °C)] 97.5 °F (36.4 °C)  Pulse:  [90-96] 94  Resp:  [16-18] 16  SpO2:  [94 %-96 %] 96 %  BP: (105-142)/(58-88) 123/69  Weight:  133.4 kg (294 lb 1.5 oz) (bed scale)  Body mass index is 47.47 kg/m².           Drains       None                    Physical Exam  Constitutional:       General: She is not in acute distress.     Appearance: Normal appearance.   HENT:      Head: Normocephalic and atraumatic.      Nose: Nose normal.   Eyes:      Conjunctiva/sclera: Conjunctivae normal.   Cardiovascular:      Rate and Rhythm: Normal rate.   Pulmonary:      Effort: Pulmonary effort is normal. No respiratory distress.   Abdominal:      General: There is no distension.      Tenderness: There is no right CVA tenderness or left CVA tenderness.   Musculoskeletal:         General: No deformity.   Skin:     General: Skin is warm and dry.   Neurological:      General: No focal deficit present.      Mental Status: She is alert.   Psychiatric:         Mood and Affect: Mood normal.         Behavior: Behavior normal.      Comments: Tearful           Significant Labs:    BMP:  Recent Labs   Lab 04/19/24 2300 04/20/24  0443 04/21/24  0547    136 132*   K 3.7 3.6 4.3    110 109   CO2 16* 15* 13*   BUN 27* 28* 36*   CREATININE 2.6* 2.8* 3.5*   CALCIUM 9.5 9.5 9.5       CBC:  Recent Labs   Lab 04/19/24 2300 04/20/24  0443 04/21/24  0547   WBC 5.31 5.48 5.54   HGB 12.6 12.9 11.6*   HCT 40.5 40.3 37.9    304 297       All pertinent labs results from the past 24 hours have been reviewed.    Significant Imaging:  All pertinent imaging results/findings from the past 24 hours have been reviewed.

## 2024-04-21 NOTE — PLAN OF CARE
Pt AAOx4. VSS, afebrile, on room air. Pain controlled on pain regimen, palliative care following for pain management. Pt on regular diet, 1x emesis/shift, PRN Zofran administered. Pt w/ complaints of acid reflux, Carafate & Maalox ordered. PRN Tums administered. Urology & Nephrology consulted. Plan for cystoscopy w/ uretheral stent placement tomorrow AM. Pt NPO @ 0000. Cr 3.5 on AM labs, IVF increased to 200 cc/hr. Pt ambulating in room independently if pain is controlled. Bed in lowest position, wheels locked, call light within pt reach. Emotional support provided to pt throughout shift. Pt updated on plan of care.

## 2024-04-21 NOTE — ASSESSMENT & PLAN NOTE
Ms. Mckinnon is a 53 y/o F with HTN, depression/anxiety, thyroid problem, and sigmoid carcinoma (s/p multiple lines of treatment) presented with worsening left hip pain. Of note, patient's recent imaging showed evidence of progression. Outpatient plan to start fruquintinib. In ED patient noted to have MARILOU. Imaging showed tumor involvement at left hip iliopsoas. Patient admitted for pain management and MARILOU. Palliative Medicine consulted for symptom management.    Sigmoid cancer/MARILOU/HTN/thyroid problem/other medical problems  - plan per primary team and other speciality consultants  - agree with urology consult    GOC/ACP  - patient decisional and by herself in room today  - no ACP documents uploaded into EMR  - patient follows with Palliative Medicine NP Claudio and Dr. Hill on [facility] already; next appt: 04/23/24  - NOK: patient's children  - On 04/20/2024 patient verbally stated she would want her middle son, Star Mckinnon, to be decision maker at [phone]  - will follow up at future visits to fill out ACP forms  - goals: improvement in symptoms to be able to go on cruise on Thursday 04/25/24 with friends; also continue life prolonging measures  - code status not discussed today    Cancer related pain  - patient reporting severe pain in left hip; she also has pain at times in abdomen/groin.   - 24 hour OME: 125   - outpatient regimen: oxycodone-apap  mg q4h prn, gabapentin 300 mg qhs, and flexeril 5 mg q8h prn. This was not providing any relief.  - currently only receiving relief from hydromorphone 1 mg IVP PRN  - recommend continue oxycodone to 15 mg q4h prn first line and continue hydromorphone 1 mg IVP as second line therapy  - continue Oxycontin 20 mg BID today, may need up titration prior to discharge  - if okay with oncology team, continue low dose steroid such as prednisone 5 mg daily/bid for inflammatory pain given muscle involvement  - patient may also benefit from increase in gabapentin to  300 mg bid as kidney function allows    Nausea/Anorexia  - patient with increased nausea due to increased pain  - patient with poor appetite constantly  - would benefit from nutrition consult while in the hospital  - continue zofran 4 mg q8h prn and prochlorperazine 10 mg q6h prn    Constipation  - ongoing issue  - uses senna at home with some relief  - continue senna and miralax as ordered    Anxiety/depression/spiritual distress  - patient feeling very overwhelmed with news of progression  - on 4/21 patient spoke of possible urologic surgery (stents vs nephrostomy tubes). Patient feeling very overwhelmed by news of additional issues. Patient upset with ongoing bad news as well as possibility of not being able to make trip with her friends. Patient spoke about how she feels that this is her last trip she will be able to make, and she wants to be able to enjoy herself and the time she has left.   - she reports not feeling at peace spiritually  - emotional support provided today  - discussed with patient about calling her  for additional support today  - continue Wellbutrin, lorazepam, and mirtazapine  - recommend scheduling mirtazapine 7.5 mg qhs    Above plan of care discussed with primary team, including attending of record, Dr. Rice.

## 2024-04-21 NOTE — PLAN OF CARE
Pt. AAOx4. Afebrile. Pain managed well with PRN IV dilaudid. No report of a fall this shift. Bed in the lowest setting, Call light within reach.

## 2024-04-21 NOTE — CONSULTS
[facility] - Transplant Stepdown  Urology  Consult Note    Patient Name: Gail Mckinnon  MRN: [id number]  Admission Date: 4/19/2024  Hospital Length of Stay: 1   Code Status: Full Code   Attending Provider: Virgie Rice MD   Consulting Provider: Tarik Roche MD  Primary Care Physician: Marah Santo MD  Principal Problem:Hip pain    Inpatient consult to Urology  Consult performed by: Tarik Roche MD  Consult ordered by: Shailesh Mckee MD          Subjective:     HPI:  54yoF with sigmoid cancer (multiple prior treatments with surgery and chemotherapy, has had lap colectomy, MARCIAL/BSO, lysis of adhesions) with evidence of disease progression and active OP plans to start fruquintinib who presented to the ED with hip pain. Vitals stable, labs show MARILOU. CT shows  tumor involvement at left hip illiopsoas which may be contributing to pain. Patient admitted for pain management and MARILOU.     On chart review patient Cr baseline likely 0.6- 1.0, Cr in 03/2024 was 1.0, and on labs since has been rising with an acute rise over the past few days up to 3.5 today. Of note patient had unremarkable CT in 01/27/2024 with no hydronephrosis and mild nephrolithiasis. No hydronephrosis on US 2/13/24. On subsequent CT on 3/11/24 patient did have mild bilateral hydroureteronephrosis without obstructing calculi. On personal review of PET from 4/10/24 there appears to be increasing hydronephrosis and potentially a small calcification in the right ureter which may represent a ureteral calculus. Patient also had significant hydronephrosis on US yesterday.     Patient seen today, vitals stable. PVR 0. UA non-concerning for infection but contaminated. Patient denies any associated symptoms, no n/v/f/c/flank pain/hematuria/dysuria. Patient endorses being told she has a history of urolithiasis but has never had any intervention for it. Denies any history of prior abdominal or pelvic radiation. Urology consulted for MARILOU.     Past  Medical History:   Diagnosis Date    Anxiety     Depression     FH: ovarian cancer 2020    Hx of psychiatric care     Effexor, Paxil, Lexapro, Zoloft, Wellbutrin, Trazodone Buspar    Hypertension     Hyperthyroidism     Hypothyroid     Kidney calculi     Malignant neoplasm of sigmoid colon 2020    Menorrhagia     Multinodular goiter 2012    Palpitation     Psychiatric problem     Venous insufficiency     Vulvar lesion        Past Surgical History:   Procedure Laterality Date     SECTION, CLASSIC      x3    COLON SURGERY      COLONOSCOPY N/A 2020    Procedure: COLONOSCOPY;  Surgeon: Shane Parker MD;  Location: [facility];  Service: Endoscopy;  Laterality: N/A;    COLONOSCOPY N/A 2022    Procedure: COLONOSCOPY;  Surgeon: Shane Parker MD;  Location: [facility];  Service: Endoscopy;  Laterality: N/A;    COLONOSCOPY N/A 2023    Procedure: COLONOSCOPY;  Surgeon: Shane Parker MD;  Location: [facility];  Service: Endoscopy;  Laterality: N/A;  no cecum anastomosis    CYSTOSCOPY W/ URETERAL STENT PLACEMENT Bilateral 2020    Procedure: CYSTOSCOPY, WITH URETERAL STENT INSERTION;  Surgeon: Claudio Tyson MD;  Location: [facility] OR [address] [place] [address];  Service: Urology;  Laterality: Bilateral;    ESOPHAGOGASTRODUODENOSCOPY N/A 2024    Procedure: EGD (ESOPHAGOGASTRODUODENOSCOPY);  Surgeon: Shane Parker MD;  Location: [facility];  Service: Gastroenterology;  Laterality: N/A;    EXAMINATION UNDER ANESTHESIA N/A 2024    Procedure: Exam under anesthesia-vagina;  Surgeon: Eli Her MD;  Location: [facility];  Service: OB/GYN;  Laterality: N/A;    EXCISION-WIDE LOCAL Left 2024    Procedure: EXCISION-WIDE LOCAL -  Labia Majora;  Surgeon: Eli Her MD;  Location: [facility] OR;  Service: OB/GYN;  Laterality: Left;  upper left side of labia majora    EXCISIONAL BIOPSY, LYMPH NODE  2022    abdominal x 4    INSERTION OF TUNNELED CENTRAL VENOUS CATHETER (CVC)  WITH SUBCUTANEOUS PORT N/A 05/01/2020    Procedure: [id number];  Surgeon: Stephen Diagnostic Provider;  Location: [facility] OR 2ND FLR;  Service: Radiology;  Laterality: N/A;  Room 189/Cindy    LAPAROSCOPIC SIGMOIDECTOMY N/A 03/25/2020    Procedure: COLECTOMY, SIGMOID, LAPAROSCOPIC, flex sig, ERAS high;  Surgeon: Silvio Man MD;  Location: [facility] OR 2ND FLR;  Service: Colon and Rectal;  Laterality: N/A;    MOBILIZATION OF SPLENIC FLEXURE  03/25/2020    Procedure: MOBILIZATION, SPLENIC FLEXURE;  Surgeon: Silvio Man MD;  Location: [facility] OR 2ND FLR;  Service: Colon and Rectal;;    TONSILLECTOMY      TOTAL ABDOMINAL HYSTERECTOMY W/ BILATERAL SALPINGOOPHORECTOMY N/A 03/25/2020    Procedure: HYSTERECTOMY, TOTAL, ABDOMINAL, WITH BILATERAL SALPINGO-OOPHORECTOMY;  Surgeon: Keron Brady MD;  Location: [facility] OR 2ND FLR;  Service: Oncology;  Laterality: N/A;       Review of patient's allergies indicates:   Allergen Reactions    Oxaliplatin Other (See Comments)     Itchy hands, throat closed up, trouble hearing - during infusion    Penicillins Hives and Rash     childhood allergy         Family History       Problem Relation (Age of Onset)    Cancer Maternal Aunt, Paternal Uncle, Maternal Grandmother    Colon cancer Paternal Uncle    Diabetes Mother (65)    Drug abuse Brother, Brother, Son, Son    Heart disease Father    No Known Problems Daughter    Ovarian cancer Maternal Aunt, Maternal Aunt, Maternal Grandmother, Cousin            Tobacco Use    Smoking status: Never    Smokeless tobacco: Never   Substance and Sexual Activity    Alcohol use: Yes     Comment: occasionally- twice monthly    Drug use: Never    Sexual activity: Yes     Partners: Male     Birth control/protection: See Surgical Hx     ROS: see HPI      Objective:     Temp:  [96.2 °F (35.7 °C)-98.5 °F (36.9 °C)] 97.5 °F (36.4 °C)  Pulse:  [90-96] 94  Resp:  [16-18] 16  SpO2:  [94 %-96 %] 96 %  BP: (105-142)/(58-88) 123/69  Weight: 133.4 kg  (294 lb 1.5 oz) (bed scale)  Body mass index is 47.47 kg/m².           Drains       None                    Physical Exam  Constitutional:       General: She is not in acute distress.     Appearance: Normal appearance.   HENT:      Head: Normocephalic and atraumatic.      Nose: Nose normal.   Eyes:      Conjunctiva/sclera: Conjunctivae normal.   Cardiovascular:      Rate and Rhythm: Normal rate.   Pulmonary:      Effort: Pulmonary effort is normal. No respiratory distress.   Abdominal:      General: There is no distension.      Tenderness: There is no right CVA tenderness or left CVA tenderness.   Musculoskeletal:         General: No deformity.   Skin:     General: Skin is warm and dry.   Neurological:      General: No focal deficit present.      Mental Status: She is alert.   Psychiatric:         Mood and Affect: Mood normal.         Behavior: Behavior normal.      Comments: Tearful           Significant Labs:    BMP:  Recent Labs   Lab 04/19/24  2300 04/20/24  0443 04/21/24  0547    136 132*   K 3.7 3.6 4.3    110 109   CO2 16* 15* 13*   BUN 27* 28* 36*   CREATININE 2.6* 2.8* 3.5*   CALCIUM 9.5 9.5 9.5       CBC:  Recent Labs   Lab 04/19/24  2300 04/20/24  0443 04/21/24  0547   WBC 5.31 5.48 5.54   HGB 12.6 12.9 11.6*   HCT 40.5 40.3 37.9    304 297       All pertinent labs results from the past 24 hours have been reviewed.    Significant Imaging:  All pertinent imaging results/findings from the past 24 hours have been reviewed.                    Assessment and Plan:     MARILOU (acute kidney injury)  54yoF with worsening MARILOU in the setting of bilateral hydroureteronephrosis. Currently unclear etiology of hydronephrosis, may be component of potential right ureteral stone vs factor from prior abdominopelvic surgical history or cancer history.      - Given bilateral hydro and rising Cr, plan for bilateral retrograde pyelograms and bilateral ureteral stent placement in OR tomorrow (4/22)   - Repeat  UA   - NPO at midnight   - Strict I/O's   - Trend Cr    - Rest of care per primary        VTE Risk Mitigation (From admission, onward)           Ordered     heparin (porcine) injection 7,500 Units  Every 8 hours         04/20/24 0838     IP VTE HIGH RISK PATIENT  Once         04/20/24 0838     Place sequential compression device  Until discontinued         04/20/24 1712                    Thank you for your consult. I will follow-up with patient. Please contact us if you have any additional questions.    Tarik Roche MD  Urology  [facility] - Transplant Stepdown

## 2024-04-21 NOTE — ANESTHESIA PREPROCEDURE EVALUATION
[facility]  Anesthesia Pre-Operative Evaluation   04/21/2024        Gail Mckinnon, 1970  [id number]  Procedure(s) (LRB):  CYSTOSCOPY, WITH URETERAL STENT INSERTION (Bilateral)    Subjective    Gail Mckinnon is a 54 y.o. female w/ a significant PMHx of sigmoid cancer (hx MARCIAL-BSO, colectomy, chemo/rad). Presented to ED with hip pain. CT with tumor involvement at left hip illiopsoas which may be contributing to pain. Patient admitted for pain management and MARILOU as well as bilateral hydroureteronephrosis.    Patient now presents for above procedure(s).     Level of Care: Stepdown  Hemodynamics: No vasopressor or inotropic support.  Respiratory Status: Room air  NPO:  4/22/24 0000    ECHO:  No results found for this or any previous visit.      Prev Airway:    Intubation     Date/Time: 1/16/2024 11:32 AM     Performed by: Marilou Sevilla CRNA  Authorized by: Volpi-Abadie, Jacqueline, MD    Intubation:     Induction:  Intravenous    Intubated:  Postinduction    Mask Ventilation:  N/a    Attempts:  1    Attempted By:  CRNA    Difficult Airway Encountered?: No      Complications:  None    Airway Device:  Supraglottic airway/LMA    Airway Device Size:  4.0    Style/Cuff Inflation:  Uncuffed    Secured at:  The lips    Placement Verified By:  Capnometry    Complicating Factors:  None          LDA:   Port A Cath Single Lumen Subclavian Right (Active)   Line Necessity Review Longterm central access required 04/04/24 0957   Site Assessment No swelling;No redness;No drainage 04/09/24 1002   Dressing Type Transparent (Tegaderm) 04/09/24 1002   Dressing Status Dry;Clean;Intact 04/09/24 1002   Dressing Intervention First dressing 04/09/24 1002   Date on Dressing 04/09/24 04/09/24 1002   Patency/Care flushed w/o difficulty;heparin locked;blood return present 04/04/24 1227   Status Accessed 04/09/24 1002   Accessed by: Nehemias KATE 04/09/24 1002   Needle Insertion Date 04/04/24 04/04/24 0957    Needle Insertion Time 0957 04/04/24 0957   Type of Needle James 04/09/24 1002   Gauge 20 04/04/24 0957   Needle Length 1.5 in 04/09/24 1002   Needle Status Changed 04/09/24 1002   Flush Performed Yes 04/09/24 1002   Needle Removal Date 04/04/24 04/04/24 1227   Needle Removal Time 1227 04/04/24 1227   Deaccessed By LAVINIA Nathan 04/04/24 1227   Number of days:             Peripheral IV - Single Lumen 04/19/24 2257 20 G Right Antecubital (Active)   Site Assessment Clean;Dry;Intact;No redness;No swelling 04/21/24 1200   Extremity Assessment Distal to IV No abnormal discoloration;No redness;No swelling;No warmth 04/21/24 0800   Line Status Flushed;Saline locked 04/21/24 0800   Dressing Status Clean;Dry;Intact 04/21/24 0800   Dressing Intervention Integrity maintained 04/21/24 0800   Dressing Change Due 04/23/24 04/21/24 0800   Site Change Due 04/23/24 04/21/24 0800   Reason Not Rotated Not due 04/21/24 0800   Number of days: 1       Drips:  Current Facility-Administered Medications   Medication Dose Route Frequency Last Rate Last Admin    sodium chloride 0.9%   Intravenous Continuous 200 mL/hr at 04/21/24 1027 New Bag at 04/21/24 1027       Patient Active Problem List   Diagnosis    Multinodular goiter    Hypertension    Screen for colon cancer    Malignant neoplasm of sigmoid colon    FH: ovarian cancer    Weight loss    Normocytic anemia    Hypoalbuminemia    Hiatal hernia    Body mass index (BMI) 45.0-49.9, adult    Renal stones    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    Anxiety    Insomnia    Insomnia    Other constipation    Nausea and vomiting    Mucositis due to chemotherapy    Morbid obesity    Secondary adenocarcinoma of lymph node    Thyroid nodule    Hypercalcemia    Transaminitis    Hypophosphatemia    Functional diarrhea    Primary hypertension    Diarrhea of presumed infectious origin    Calculus of gallbladder without cholecystitis without obstruction    ACP (advance care planning)    Abdominal pain     Shortness of breath    Secondary malignant neoplasm of retroperitoneum    Depressive disorder    Hypothyroidism    Immunodeficiency due to chemotherapy    Acneiform rash    Chronic midline low back pain without sciatica    Chemotherapy-induced fatigue    Vulval lesion    Iron deficiency anemia due to chronic blood loss    Acute pancreatitis    Anxiety and depression    Hypoglycemia    Secondary liver cancer    Mesenteric adenitis    Hypokalemia    Dehydration    Acute kidney failure    Left leg pain    Hip pain    MARILOU (acute kidney injury)    Palliative care encounter       Review of patient's allergies indicates:   Allergen Reactions    Oxaliplatin Other (See Comments)     Itchy hands, throat closed up, trouble hearing - during infusion    Penicillins Hives and Rash     childhood allergy         Current Inpatient Medications:   Current Facility-Administered Medications   Medication Dose Route Frequency    acetaminophen  1,000 mg Oral Q8H    buPROPion  150 mg Oral Daily    cinacalcet  30 mg Oral Daily with breakfast    famotidine  20 mg Oral Daily    gabapentin  300 mg Oral QHS    heparin (porcine)  7,500 Units Subcutaneous Q8H    levothyroxine  350 mcg Oral Before breakfast    mupirocin   Nasal BID    oxyCODONE  20 mg Oral Q12H    polyethylene glycol  17 g Oral Daily    predniSONE  5 mg Oral Daily    senna-docusate 8.6-50 mg  1 tablet Oral BID    sucralfate  1 g Oral QID       Current Facility-Administered Medications on File Prior to Encounter   Medication Dose Route Frequency Provider Last Rate Last Admin    ondansetron injection 4 mg  4 mg Intravenous Daily PRN Volpi-Abadie, Jacqueline, MD        sodium chloride 0.9% flush 3 mL  3 mL Intravenous PRN Volpi-Abadie, Jacqueline, MD         Current Outpatient Medications on File Prior to Encounter   Medication Sig Dispense Refill    acetaminophen (TYLENOL) 500 MG tablet Take 2 tablets (1,000 mg total) by mouth every 8 (eight) hours. 60 tablet 0    buPROPion  (WELLBUTRIN SR) 150 MG TBSR 12 hr tablet Take 1 tablet (150 mg total) by mouth 2 (two) times daily. 180 tablet 0    cinacalcet (SENSIPAR) 30 MG Tab Take 1 tablet (30 mg total) by mouth daily with breakfast. 30 tablet 11    dicyclomine (BENTYL) 20 mg tablet Take 1 tablet (20 mg total) by mouth 2 (two) times daily as needed (abdominal pain). 180 tablet 0    famotidine (PEPCID) 40 MG tablet Take 1 tablet (40 mg total) by mouth once daily. 30 tablet 11    fruquintinib 5 mg Cap Take 1 tablet daily for days 1 through 21 and then repeat every 28 days 21 capsule 6    gabapentin (NEURONTIN) 300 MG capsule Take 1 capsule (300 mg total) by mouth every evening. 30 capsule 0    Lactobacillus rhamnosus GG (CULTURELLE) 10 billion cell capsule Take 1 capsule by mouth once daily.      lactulose (CHRONULAC) 10 gram/15 mL solution Take 15 mLs (10 g total) by mouth every 6 (six) hours as needed (constipation). 150 mL 1    levothyroxine (SYNTHROID) 175 MCG tablet Take 2 tablets (350 mcg total) by mouth before breakfast. 60 tablet 11    LIDOcaine-prilocaine (EMLA) cream Apply topically as needed (30 to 60 min prior chemo or port use). 30 g 3    LORazepam (ATIVAN) 1 MG tablet Take 1 tablet (1 mg total) by mouth every 12 (twelve) hours as needed for Anxiety (nausea). 30 tablet 0    magic mouthwash diphen/antac/lidoc/nysta Swish and swallow 10 ml by mouth 4 times daily 500 mL 6    mirtazapine (REMERON) 7.5 MG Tab Take 1 tablet (7.5 mg total) by mouth every evening. (Patient taking differently: Take 7.5 mg by mouth nightly as needed.) 30 tablet 0    multivitamin (THERAGRAN) per tablet Take 1 tablet by mouth once daily.      olmesartan (BENICAR) 20 MG tablet Take 1 tablet (20 mg total) by mouth once daily. 30 tablet 5    ondansetron (ZOFRAN-ODT) 4 MG TbDL Take 1 tablet (4 mg total) by mouth every 8 (eight) hours as needed (Nausea). 20 tablet 0    ondansetron (ZOFRAN-ODT) 8 MG TbDL Take 1 tablet (8 mg total) by mouth every 8 (eight) hours as  needed. 60 tablet 3    oxyCODONE (ROXICODONE) 5 MG immediate release tablet Take 1 tablet (5 mg total) by mouth every 4 (four) hours as needed for Pain (Abdominal pain). 8 tablet 0    potassium chloride (MICRO-K) 10 MEQ CpSR Take 4 capsules (40 mEq total) by mouth once daily. 120 capsule 3    prochlorperazine (COMPAZINE) 10 MG tablet Take 1 tablet (10 mg total) by mouth every 6 (six) hours as needed. 20 tablet 0    senna (SENOKOT) 8.6 mg tablet Take 2 tablets by mouth once daily. 60 tablet 2       Past Surgical History:   Procedure Laterality Date     SECTION, CLASSIC      x3    COLON SURGERY      COLONOSCOPY N/A 2020    Procedure: COLONOSCOPY;  Surgeon: Shane Parker MD;  Location: [facility];  Service: Endoscopy;  Laterality: N/A;    COLONOSCOPY N/A 2022    Procedure: COLONOSCOPY;  Surgeon: Shane Parker MD;  Location: [facility];  Service: Endoscopy;  Laterality: N/A;    COLONOSCOPY N/A 2023    Procedure: COLONOSCOPY;  Surgeon: Shane Parker MD;  Location: [facility];  Service: Endoscopy;  Laterality: N/A;  no cecum anastomosis    CYSTOSCOPY W/ URETERAL STENT PLACEMENT Bilateral 2020    Procedure: CYSTOSCOPY, WITH URETERAL STENT INSERTION;  Surgeon: Claudio Tyson MD;  Location: [address];  Service: Urology;  Laterality: Bilateral;    ESOPHAGOGASTRODUODENOSCOPY N/A 2024    Procedure: EGD (ESOPHAGOGASTRODUODENOSCOPY);  Surgeon: Shane Parker MD;  Location: [facility];  Service: Gastroenterology;  Laterality: N/A;    EXAMINATION UNDER ANESTHESIA N/A 2024    Procedure: Exam under anesthesia-vagina;  Surgeon: Eli Her MD;  Location: [facility] OR;  Service: OB/GYN;  Laterality: N/A;    EXCISION-WIDE LOCAL Left 2024    Procedure: EXCISION-WIDE LOCAL -  Labia Majora;  Surgeon: Eli Her MD;  Location: [facility] OR;  Service: OB/GYN;  Laterality: Left;  upper left side of labia majora    EXCISIONAL BIOPSY, LYMPH NODE  2022    abdominal x 4     INSERTION OF TUNNELED CENTRAL VENOUS CATHETER (CVC) WITH SUBCUTANEOUS PORT N/A 05/01/2020    Procedure: [id number];  Surgeon: Stephen Diagnostic Provider;  Location: [facility] OR 2ND FLR;  Service: Radiology;  Laterality: N/A;  Room 189/[facility]    LAPAROSCOPIC SIGMOIDECTOMY N/A 03/25/2020    Procedure: COLECTOMY, SIGMOID, LAPAROSCOPIC, flex sig, ERAS high;  Surgeon: Silvio Man MD;  Location: [facility] OR 2ND FLR;  Service: Colon and Rectal;  Laterality: N/A;    MOBILIZATION OF SPLENIC FLEXURE  03/25/2020    Procedure: MOBILIZATION, SPLENIC FLEXURE;  Surgeon: Silvio Man MD;  Location: [facility] OR 2ND FLR;  Service: Colon and Rectal;;    TONSILLECTOMY      TOTAL ABDOMINAL HYSTERECTOMY W/ BILATERAL SALPINGOOPHORECTOMY N/A 03/25/2020    Procedure: HYSTERECTOMY, TOTAL, ABDOMINAL, WITH BILATERAL SALPINGO-OOPHORECTOMY;  Surgeon: Keron Brady MD;  [facility];  Service: Oncology;  Laterality: N/A;       Social History:  Tobacco Use: Low Risk  (4/9/2024)    Patient History     Smoking Tobacco Use: Never     Smokeless Tobacco Use: Never     Passive Exposure: Not on file       Alcohol Use: Not At Risk (2/19/2024)    AUDIT-C     Frequency of Alcohol Consumption: Never     Average Number of Drinks: Patient does not drink     Frequency of Binge Drinking: Less than monthly       Objective    Vital Signs Range:  BMI Readings from Last 1 Encounters:   04/21/24 47.47 kg/m²       Temp:  [35.9 °C (96.7 °F)-36.4 °C (97.5 °F)]   Pulse:  [93-94]   Resp:  [16-18]   BP: (123-142)/(69-88)   SpO2:  [96 %]        Significant Labs:        Component Value Date/Time    WBC 5.54 04/21/2024 0547    HGB 11.6 (L) 04/21/2024 0547    HCT 37.9 04/21/2024 0547     04/21/2024 0547     (L) 04/21/2024 0547    K 4.3 04/21/2024 0547     04/21/2024 0547    CO2 13 (L) 04/21/2024 0547    GLU 98 04/21/2024 0547    BUN 36 (H) 04/21/2024 0547    CREATININE 3.5 (H) 04/21/2024 0547    MG 1.8 04/21/2024 0547     PHOS 5.3 (H) 04/21/2024 0547    CALCIUM 9.5 04/21/2024 0547    ALBUMIN 2.4 (L) 04/21/2024 0547    PROT 6.0 04/21/2024 0547    ALKPHOS 128 04/21/2024 0547    BILITOT 0.3 04/21/2024 0547    AST 12 04/21/2024 0547    ALT 8 (L) 04/21/2024 0547    INR 1.1 01/15/2024 1039    INR 1.1 05/01/2020 1121    HGBA1C 5.3 01/27/2024 1726        Please see Results Review for additional labs.     Diagnostic Studies: All relevant studies, reviewed.      EKG:   Results for orders placed or performed during the hospital encounter of 03/11/24   EKG 12-lead    Collection Time: 03/11/24 11:45 AM   Result Value Ref Range    QRS Duration 74 ms    [facility] QTC Calculation 425 ms    Narrative    Test Reason : R10.10,    Vent. Rate : 112 BPM     Atrial Rate : 112 BPM     P-R Int : 182 ms          QRS Dur : 074 ms      QT Int : 312 ms       P-R-T Axes : 078 087 076 degrees     QTc Int : 425 ms    Sinus tachycardia  Otherwise normal ECG  When compared with ECG of 13-FEB-2024 21:49,  No significant change was found  Confirmed by Mohamud López MD ([id number]) on 3/12/2024 5:58:41 PM    Referred By:  FAIZAN           Confirmed By:Mohamud López MD                                                                                                                 04/21/2024  Gail Mckinnon is a 54 y.o., female.      Pre-op Assessment    I have reviewed the Patient Summary Reports.     I have reviewed the Nursing Notes. I have reviewed the NPO Status.   I have reviewed the Medications.     Review of Systems  Anesthesia Hx:  No problems with previous Anesthesia   History of prior surgery of interest to airway management or planning:          Denies Family Hx of Anesthesia complications.    Denies Personal Hx of Anesthesia complications.                    Social:  Non-Smoker       Hematology/Oncology:                        --  Cancer in past history:              surgery and chemotherapy   Oncology Comments: recurrent widely metastatic colon cancer,  currently on chemo     Cardiovascular:  Exercise tolerance: poor   Hypertension    Denies CAD.    Denies CABG/stent.     Denies CHF.    no hyperlipidemia STANLEY (chemo side effect)  ECG has been reviewed. Low functional capacity due to chemo/cancer/debility -gets fatigued                   Hypertension         Pulmonary:  Pulmonary Normal   Denies COPD.  Denies Asthma.     Denies Sleep Apnea.                Renal/:  Chronic Renal Disease renal calculi       Kidney Function/Disease             Hepatic/GI:    Hiatal Hernia,  Denies GERD. Liver Disease,      Hernia, Hiatal Hernia   Liver Disease        Neurological:    Denies CVA. Neuromuscular Disease,   Denies Headaches. Denies Seizures.                              Neuromuscular Disease   Endocrine:  Denies Diabetes. Hypothyroidism Hyperthyroidism       Morbid Obesity / BMI > 40Denies Obesity / BMI > 30  Psych:  Psychiatric History anxiety depression                   Anesthesia Plan  Type of Anesthesia, risks & benefits discussed:    Anesthesia Type: Gen ETT, Gen Supraglottic Airway  Intra-op Monitoring Plan: Standard ASA Monitors  Post Op Pain Control Plan: multimodal analgesia and IV/PO Opioids PRN  Induction:  IV  Airway Plan: Direct and Video, Post-Induction  Informed Consent: Informed consent signed with the Patient and all parties understand the risks and agree with anesthesia plan.  All questions answered. Patient consented to blood products? Yes  ASA Score: 3  Day of Surgery Review of History & Physical: H&P Update referred to the surgeon/provider.    Ready For Surgery From Anesthesia Perspective.     .

## 2024-04-21 NOTE — ASSESSMENT & PLAN NOTE
54yoF with worsening MARILOU in the setting of bilateral hydroureteronephrosis. Currently unclear etiology of hydronephrosis, may be component of potential right ureteral stone vs factor from prior abdominopelvic surgical history or cancer history.      - Given bilateral hydro and rising Cr, recommending bilateral retrograde pyelograms and bilateral ureteral stent placement in OR tomorrow (4/22)   - Attempted to obtain surgical consent today but patient currently unwilling to have surgery given she has a cruise on Thursday. Explained to patient medical importance of stent placement in setting of worsening MARILOU. Will follow up with patient again today.    - Repeat UA   - NPO at midnight   - Strict I/O's   - Trend Cr    - Rest of care per primary

## 2024-04-22 PROBLEM — N13.30 HYDRONEPHROSIS, BILATERAL: Status: ACTIVE | Noted: 2024-01-01

## 2024-04-22 NOTE — NURSING
Pt arrived to [facility] for nephrostomy tube insertion with anesthesia, escorted to room by RN and CRNA who will assume care. Pt oriented to unit and staff. Plan of care reviewed with patient, patient verbalizes understanding. Comfort measures utilized. Pt safely transferred from stretcher to procedural table. Fall risk reviewed with patient, fall risk interventions maintained.  positioner pillows utilized to minimize pressure points. Blankets applied. Pt prepped and draped utilizing standard sterile technique. Patient placed on continuous monitoring, as required by sedation policy. Timeouts completed utilizing standard universal time-out, per department and facility policy. RN to remain at bedside, continuous monitoring maintained. Pt resting comfortably. Denies pain/discomfort. Will continue to monitor. See flow sheets for monitoring, medication administration, and updates.

## 2024-04-22 NOTE — ASSESSMENT & PLAN NOTE
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 Pain improving with adjustments made to regimen per palliative.      Plan:  - Rad/Onc consulted for consideration of palliative radiation to L laurencesoas  - per palliative medicine,  - recommend increasing oxycodone to 15 mg q4h prn first line and continue hydromorphone 1 mg IVP as second line therapy  - Oxycontin 20 mg BID , may need up titration prior to discharge  - if okay with oncology team, consider starting low dose steroid such as prednisone 5 mg daily/bid for inflammatory pain given muscle involvement  - patient may also benefit from increase in gabapentin to 300 mg bid as kidney function allows (estimated Cr clearance today 45 so max safe dose is around 900 mg/day)

## 2024-04-22 NOTE — ANESTHESIA PROCEDURE NOTES
Intubation    Date/Time: 4/22/2024 2:51 PM    Performed by: Raquel Maria CRNA  Authorized by: Vanessa Almanza MD    Intubation:     Induction:  Intravenous    Intubated:  Postinduction    Mask Ventilation:  Easy mask    Attempts:  1    Attempted By:  CRNA    Method of Intubation:  Video laryngoscopy    Blade:  Galvez 3    Laryngeal View Grade: Grade I - full view of cords      Difficult Airway Encountered?: No      Complications:  None    Airway Device:  Oral endotracheal tube    Airway Device Size:  7.0    Style/Cuff Inflation:  Cuffed (inflated to minimal occlusive pressure)    Tube secured:  21    Secured at:  The lips    Placement Verified By:  Capnometry    Complicating Factors:  None    Findings Post-Intubation:  BS equal bilateral and atraumatic/condition of teeth unchanged

## 2024-04-22 NOTE — PROGRESS NOTES
Dr. CHANDRIKA Maradiaga at bedside to speak to patient & daughter about findings and plan of care.

## 2024-04-22 NOTE — PROGRESS NOTES
[facility] - Transplant Stepdown  Hematology/Oncology  Progress Note    Patient Name: Gail Mckinnon  Admission Date: 4/19/2024  Hospital Length of Stay: 2 days  Code Status: Full Code     Subjective:     HPI:  54F with sigmoid cancer (patient of Dr. Pimentel, has progressed through multiple therapies and recently on single agent Debo) with evidence of disease progression and active OP plans to start fruquintinib who presented to the ED with hip pain. Vitals stable, labs show MARILOU. CT shows tumor involvement at left hip illiopsoas which may be contributing to pain. Patient admitted for pain management and MARILOU.     Interval History: Pain better controlled this morning with new regimen however still present, will consult rad onc for palliative radiation eval.    Urology unable to place stents, consulted IR for bilateral nephrostomy placement.    Oncology Treatment Plan:   [No matching plan found]    Medications:  Continuous Infusions:  Current Facility-Administered Medications   Medication Dose Route Frequency Last Rate Last Admin     Scheduled Meds:  Current Facility-Administered Medications   Medication Dose Route Frequency    acetaminophen  1,000 mg Oral Q8H    aluminum-magnesium hydroxide-simethicone  30 mL Oral QID (AC & HS)    buPROPion  150 mg Oral Daily    cinacalcet  30 mg Oral Daily with breakfast    famotidine (PF)        famotidine  20 mg Oral Daily    gabapentin  300 mg Oral QHS    heparin (porcine)  7,500 Units Subcutaneous Q8H    levothyroxine  350 mcg Oral Before breakfast    mupirocin   Nasal BID    oxyCODONE  20 mg Oral Q12H    polyethylene glycol  17 g Oral Daily    predniSONE  5 mg Oral Daily    senna-docusate 8.6-50 mg  1 tablet Oral BID    sucralfate  1 g Oral QID     PRN Meds:  Current Facility-Administered Medications:     calcium carbonate, 1,000 mg, Oral, TID PRN    dextrose 10%, 12.5 g, Intravenous, PRN    dextrose 10%, 25 g, Intravenous, PRN    dicyclomine, 20 mg, Oral, BID PRN     famotidine (PF), , ,     glucagon (human recombinant), 1 mg, Intramuscular, PRN    glucose, 16 g, Oral, PRN    glucose, 24 g, Oral, PRN    HYDROmorphone, 1 mg, Intravenous, Q4H PRN    LORazepam, 1 mg, Oral, Q12H PRN    mirtazapine, 7.5 mg, Oral, Nightly PRN    naloxone, 0.02 mg, Intravenous, PRN    ondansetron, 4 mg, Oral, Q8H PRN    oxyCODONE, 15 mg, Oral, Q4H PRN    prochlorperazine, 10 mg, Oral, Q6H PRN    sodium chloride 0.9%, 10 mL, Intravenous, Q12H PRN     Review of Systems  Objective:     Vital Signs (Most Recent):  Temp: 97.7 °F (36.5 °C) (04/22/24 1005)  Pulse: 97 (04/22/24 1045)  Resp: 16 (04/22/24 1147)  BP: 124/60 (04/22/24 1045)  SpO2: 95 % (04/22/24 1045) Vital Signs (24h Range):  Temp:  [97.5 °F (36.4 °C)-98.1 °F (36.7 °C)] 97.7 °F (36.5 °C)  Pulse:  [] 97  Resp:  [13-18] 16  SpO2:  [91 %-98 %] 95 %  BP: ()/(55-84) 124/60     Weight: 133.4 kg (294 lb 1.5 oz)  Body mass index is 47.47 kg/m².  Body surface area is 2.49 meters squared.      Intake/Output Summary (Last 24 hours) at 4/22/2024 1419  Last data filed at 4/22/2024 0950  Gross per 24 hour   Intake 500 ml   Output 50 ml   Net 450 ml        Physical Exam  Vitals reviewed.   Constitutional:       General: She is not in acute distress.     Appearance: She is not toxic-appearing or diaphoretic.   HENT:      Head: Normocephalic and atraumatic.      Right Ear: External ear normal.      Left Ear: External ear normal.      Nose: Nose normal.      Mouth/Throat:      Mouth: Mucous membranes are moist.   Eyes:      General: No scleral icterus.        Right eye: No discharge.         Left eye: No discharge.      Extraocular Movements: Extraocular movements intact.   Neck:      Comments: Trachea midline  Cardiovascular:      Rate and Rhythm: Normal rate.   Pulmonary:      Effort: Pulmonary effort is normal. No respiratory distress.   Abdominal:      Palpations: Abdomen is soft.   Musculoskeletal:         General: No deformity or signs of injury.       Cervical back: Normal range of motion.   Skin:     General: Skin is dry.      Coloration: Skin is not jaundiced.      Findings: No rash.   Neurological:      General: No focal deficit present.      Mental Status: She is alert and oriented to person, place, and time.      Cranial Nerves: No cranial nerve deficit.   Psychiatric:         Behavior: Behavior normal.         Judgment: Judgment normal.          Significant Labs:   All pertinent labs from the last 24 hours have been reviewed.    Diagnostic Results:  I have reviewed all pertinent imaging results/findings within the past 24 hours.  Assessment/Plan:     * Hip pain  - CT evidence of tumor involvement at left hip illiopsoas which may be contributing to pain. Pain improving with adjustments made to regimen per palliative.      Plan:  - Rad/Onc consulted for consideration of palliative radiation to L illiopsoas  - per palliative medicine,  - recommend increasing oxycodone to 15 mg q4h prn first line and continue hydromorphone 1 mg IVP as second line therapy  - Oxycontin 20 mg BID , may need up titration prior to discharge  - if okay with oncology team, consider starting low dose steroid such as prednisone 5 mg daily/bid for inflammatory pain given muscle involvement  - patient may also benefit from increase in gabapentin to 300 mg bid as kidney function allows (estimated Cr clearance today 45 so max safe dose is around 900 mg/day)    MARILOU (acute kidney injury)  Pt reports decreased urine output and decreased PO intake    Creatinine 2.8 on admit, baseline around 0.9 - 1.0  - Likely pre-renal from dehydration and volume losses superimposed on post renal  - continuing to worsen, Urology unable to stent      Results:  US retroperitoneal: Moderate bilateral hydronephrosis, corresponding to findings on prior CT performed 03/11/2024.       Plan:   - IR consulted for bilateral nephrostomy placement  - Strict I&Os and daily weights   - Avoid nephrotoxic agents such as  NSAIDs, gadolinium and IV radiocontrast.  - Renally dose meds to current GFR.  - Maintain MAP > 65.        Hypothyroidism  - continue home synthroid     Other constipation  Senna BID    Anxiety  - home bupropion     Malignant neoplasm of sigmoid colon  Patient of Dr. Pimentel, has progressed through multiple therapies and recently on single agent Debo) with evidence of disease progression and active OP plans to start fruquintinib              Dimitry Carrasco MD  Hematology/Oncology  [facility] - Transplant Stepdown

## 2024-04-22 NOTE — ASSESSMENT & PLAN NOTE
54yoF with worsening MARILOU in the setting of bilateral hydroureteronephrosis. Currently unclear etiology of hydronephrosis, may be component of potential right ureteral stone vs factor from prior abdominopelvic surgical history or cancer history.      - Given bilateral hydro and rising Cr, planning to go to OR today for bilateral retrograde pyelograms and bilateral ureteral stent placement   - Consent obtained   - Repeat UA yesterday contaminated but low concern for infection   - NPO   - Strict I/O's   - Trend Cr    - Rest of care per primary

## 2024-04-22 NOTE — PLAN OF CARE
Pt. AAOx4. Afebrile. Pain managed well with scheduled and PRN PO pain meds. No report of a fall this shift. Bed in the lowest setting, Call light within reach

## 2024-04-22 NOTE — OP NOTE
[facility]  Operative Note    Date: 04/22/2024    Pre-Op Diagnosis: Bilateral hydronephrosis, MARILOU    Post-Op Diagnosis: Same    Procedure(s) Performed:   1.  Cystoscopy     Specimen(s): None    Staff Surgeon: Willie Bailey MD    Assistant Surgeon: Percy Velez MD (TA); Jessica Posadas MD    Anesthesia: General mask inhalational anesthesia    Indications: Gail Mckinnon is a 54 y.o. female with bilateral hydronephrosis and MARILOU likely from distal ureteral obstruction from large mass.    Findings:   Unable to locate ureteral orifices due to distorted bladder architecture and inflammation. Concerning for bladder wall invasion of colon.  Indigo carmine administered with lasix 10 mg IV, no blue efflux seen within bladder lumen  Decision made to abort procedure and consult IR for bilateral nephrostomy tube placement    Estimated Blood Loss: min    Drains: None    Procedure in Detail:  After risks, benefits and possible complications of cystoscopy were explained, the patient elected to undergo the procedure and informed consent was obtained. All questions were answered in the caro-operative area. The patient was transferred to the cystoscopy suite and placed in the supine position.  SCDs were applied and working.  Anesthesia was administered.  Once the patient was adequately sedated, she was placed in the dorsal lithotomy position and prepped and draped in the usual sterile fashion.  Time out was performed, caro-procedural antibiotics were confirmed.     A rigid cystoscope in a 22 Fr sheath was introduced into the bladder per urethra. This passed easily.  The entire urethra was visualized which showed no masses or strictures. The bladder architecture was distorted due to the posterior mass.  Formal cystoscopy was performed which revealed no masses or lesions suspicious for urothelial malignancy, no trabeculations, no bladder stones and no bladder diverticuli. We attempted to locate the ureteral  orifices but were unsuccessful. We then asked anesthesia to administer indigo carmine and lasix. We continued to search for the ureteral orifies without success. No blue efflux was seen within the bladder lumen after adequate time. The bladder was drained, and the patient was removed from lithotomy.     The patient tolerated the procedure well and was transferred to recovery in stable condition.    Disposition:  The patient will be transferred back under the care of heme/onc. They will remain NPO for bilateral nephrostomy tube placement by IR, who we will consult now.    HIEN Womack was present entire procedure and agree with above  LPrats MD

## 2024-04-22 NOTE — PROCEDURES
Radiology Post-Procedure Note    Pre Op Diagnosis: right and left hydronephrosis    Post Op Diagnosis: right and left hydronephrosis    Procedure:right and left percutaneous nephrostomy    Procedure performed by: Dhruv Harrington MD    Written Informed Consent Obtained: Yes    Specimen Removed: NO    Estimated Blood Loss: Minimal    Findings: Local anesthesia and moderate sedation were used.      Ultrasound and fluoroscopy were used to localize the bilateral collecting system and a percutaneous catheter was introduced.  Position of the catheter in the collecting system was confirmed using injection of iodinated contrast.      The patient tolerated the procedure well and there were no complications.  A specimen was sent to the lab for further analysis and culture. Please see Imaging report for further details.    Dhruv Harrington M.D.  Interventional Radiology  Department of Radiology

## 2024-04-22 NOTE — SUBJECTIVE & OBJECTIVE
Interval History: Pain better controlled this morning with new regimen however still present, will consult rad onc for palliative radiation eval.    Urology unable to place stents, consulted IR for bilateral nephrostomy placement.    Oncology Treatment Plan:   [No matching plan found]    Medications:  Continuous Infusions:  Current Facility-Administered Medications   Medication Dose Route Frequency Last Rate Last Admin     Scheduled Meds:  Current Facility-Administered Medications   Medication Dose Route Frequency    acetaminophen  1,000 mg Oral Q8H    aluminum-magnesium hydroxide-simethicone  30 mL Oral QID (AC & HS)    buPROPion  150 mg Oral Daily    cinacalcet  30 mg Oral Daily with breakfast    famotidine (PF)        famotidine  20 mg Oral Daily    gabapentin  300 mg Oral QHS    heparin (porcine)  7,500 Units Subcutaneous Q8H    levothyroxine  350 mcg Oral Before breakfast    mupirocin   Nasal BID    oxyCODONE  20 mg Oral Q12H    polyethylene glycol  17 g Oral Daily    predniSONE  5 mg Oral Daily    senna-docusate 8.6-50 mg  1 tablet Oral BID    sucralfate  1 g Oral QID     PRN Meds:  Current Facility-Administered Medications:     calcium carbonate, 1,000 mg, Oral, TID PRN    dextrose 10%, 12.5 g, Intravenous, PRN    dextrose 10%, 25 g, Intravenous, PRN    dicyclomine, 20 mg, Oral, BID PRN    famotidine (PF), , ,     glucagon (human recombinant), 1 mg, Intramuscular, PRN    glucose, 16 g, Oral, PRN    glucose, 24 g, Oral, PRN    HYDROmorphone, 1 mg, Intravenous, Q4H PRN    LORazepam, 1 mg, Oral, Q12H PRN    mirtazapine, 7.5 mg, Oral, Nightly PRN    naloxone, 0.02 mg, Intravenous, PRN    ondansetron, 4 mg, Oral, Q8H PRN    oxyCODONE, 15 mg, Oral, Q4H PRN    prochlorperazine, 10 mg, Oral, Q6H PRN    sodium chloride 0.9%, 10 mL, Intravenous, Q12H PRN     Review of Systems  Objective:     Vital Signs (Most Recent):  Temp: 97.7 °F (36.5 °C) (04/22/24 1005)  Pulse: 97 (04/22/24 1045)  Resp: 16 (04/22/24 1147)  BP:  124/60 (04/22/24 1045)  SpO2: 95 % (04/22/24 1045) Vital Signs (24h Range):  Temp:  [97.5 °F (36.4 °C)-98.1 °F (36.7 °C)] 97.7 °F (36.5 °C)  Pulse:  [] 97  Resp:  [13-18] 16  SpO2:  [91 %-98 %] 95 %  BP: ()/(55-84) 124/60     Weight: 133.4 kg (294 lb 1.5 oz)  Body mass index is 47.47 kg/m².  Body surface area is 2.49 meters squared.      Intake/Output Summary (Last 24 hours) at 4/22/2024 1419  Last data filed at 4/22/2024 0950  Gross per 24 hour   Intake 500 ml   Output 50 ml   Net 450 ml        Physical Exam  Vitals reviewed.   Constitutional:       General: She is not in acute distress.     Appearance: She is not toxic-appearing or diaphoretic.   HENT:      Head: Normocephalic and atraumatic.      Right Ear: External ear normal.      Left Ear: External ear normal.      Nose: Nose normal.      Mouth/Throat:      Mouth: Mucous membranes are moist.   Eyes:      General: No scleral icterus.        Right eye: No discharge.         Left eye: No discharge.      Extraocular Movements: Extraocular movements intact.   Neck:      Comments: Trachea midline  Cardiovascular:      Rate and Rhythm: Normal rate.   Pulmonary:      Effort: Pulmonary effort is normal. No respiratory distress.   Abdominal:      Palpations: Abdomen is soft.   Musculoskeletal:         General: No deformity or signs of injury.      Cervical back: Normal range of motion.   Skin:     General: Skin is dry.      Coloration: Skin is not jaundiced.      Findings: No rash.   Neurological:      General: No focal deficit present.      Mental Status: She is alert and oriented to person, place, and time.      Cranial Nerves: No cranial nerve deficit.   Psychiatric:         Behavior: Behavior normal.         Judgment: Judgment normal.          Significant Labs:   All pertinent labs from the last 24 hours have been reviewed.    Diagnostic Results:  I have reviewed all pertinent imaging results/findings within the past 24 hours.

## 2024-04-22 NOTE — ASSESSMENT & PLAN NOTE
Pt reports decreased urine output and decreased PO intake    Creatinine 2.8 on admit, baseline around 0.9 - 1.0  - Likely pre-renal from dehydration and volume losses superimposed on post renal  - continuing to worsen, Urology unable to stent      Results:  US retroperitoneal: Moderate bilateral hydronephrosis, corresponding to findings on prior CT performed 03/11/2024.       Plan:   - IR consulted for bilateral nephrostomy placement  - Strict I&Os and daily weights   - Avoid nephrotoxic agents such as NSAIDs, gadolinium and IV radiocontrast.  - Renally dose meds to current GFR.  - Maintain MAP > 65.

## 2024-04-22 NOTE — ANESTHESIA POSTPROCEDURE EVALUATION
Anesthesia Post Evaluation    Patient: Gail Mckinnon    Procedure(s) Performed: * No procedures listed *    Final Anesthesia Type: general      Patient location during evaluation: PACU  Patient participation: Yes- Able to Participate  Level of consciousness: awake and alert  Post-procedure vital signs: reviewed and stable  Pain management: adequate  Airway patency: patent    PONV status at discharge: No PONV  Anesthetic complications: no      Cardiovascular status: blood pressure returned to baseline and hemodynamically stable  Respiratory status: unassisted and spontaneous ventilation  Hydration status: euvolemic  Follow-up not needed.              Vitals Value Taken Time   /65 04/22/24 1646   Temp 36.3 °C (97.3 °F) 04/22/24 1617   Pulse 98 04/22/24 1649   Resp 13 04/22/24 1649   SpO2 90 % 04/22/24 1649   Vitals shown include unfiled device data.      No case tracking events are documented in the log.      Pain/Aggie Score: Pain Rating Prior to Med Admin: 8 (4/22/2024 11:47 AM)  Pain Rating Post Med Admin: 4 (4/21/2024  3:51 PM)  Aggie Score: 5 (4/22/2024  4:15 PM)

## 2024-04-22 NOTE — TRANSFER OF CARE
"Anesthesia Transfer of Care Note    Patient: Gail Mckinnon    Procedure(s) Performed: Procedure(s) (LRB):  CYSTOSCOPY (N/A)    Patient location: PACU    Anesthesia Type: general    Transport from OR: Transported from OR on 6-10 L/min O2 by face mask with adequate spontaneous ventilation    Post pain: adequate analgesia    Post assessment: no apparent anesthetic complications    Post vital signs: stable    Level of consciousness: sedated    Nausea/Vomiting: no nausea/vomiting    Complications: none    Transfer of care protocol was followed    Last vitals: Visit Vitals  /78 (BP Location: Left arm, Patient Position: Lying)   Pulse 85   Temp 36.7 °C (98.1 °F) (Oral)   Resp 16   Ht 5' 6" (1.676 m)   Wt 133.4 kg (294 lb 1.5 oz)   LMP 01/22/2020   SpO2 98%   Breastfeeding No   BMI 47.47 kg/m²     "

## 2024-04-22 NOTE — ANESTHESIA PREPROCEDURE EVALUATION
04/22/2024      Gail Mckinnon is a 54 y.o. female with hx of colon cancer s/p resection and chemo now presenting with MARILOU and hydronephrosis    Patient Active Problem List   Diagnosis    Multinodular goiter    Hypertension    Screen for colon cancer    Malignant neoplasm of sigmoid colon    FH: ovarian cancer    Weight loss    Normocytic anemia    Hypoalbuminemia    Hiatal hernia    Body mass index (BMI) 45.0-49.9, adult    Renal stones    Metastasis to intestinal lymph node    Anxiety    Insomnia    Insomnia    Other constipation    Nausea and vomiting    Mucositis due to chemotherapy    Morbid obesity    Secondary adenocarcinoma of lymph node    Thyroid nodule    Hypercalcemia    Transaminitis    Hypophosphatemia    Functional diarrhea    Primary hypertension    Diarrhea of presumed infectious origin    Calculus of gallbladder without cholecystitis without obstruction    ACP (advance care planning)    Abdominal pain    Shortness of breath    Secondary malignant neoplasm of retroperitoneum    Depressive disorder    Hypothyroidism    Immunodeficiency due to chemotherapy    Acneiform rash    Chronic midline low back pain without sciatica    Chemotherapy-induced fatigue    Vulval lesion    Iron deficiency anemia due to chronic blood loss    Acute pancreatitis    Anxiety and depression    Hypoglycemia    Secondary liver cancer    Mesenteric adenitis    Hypokalemia    Dehydration    Acute kidney failure    Left leg pain    Hip pain    MARILOU (acute kidney injury)    Palliative care encounter    Hydronephrosis, bilateral       Review of patient's allergies indicates:   Allergen Reactions    Oxaliplatin Other (See Comments)     Itchy hands, throat closed up, trouble hearing - during infusion    Penicillins Hives and Rash     childhood allergy         Current Facility-Administered Medications on File Prior to  Encounter   Medication Dose Route Frequency Provider Last Rate Last Admin    ondansetron injection 4 mg  4 mg Intravenous Daily PRN Volpi-Abadie, Jacqueline, MD        sodium chloride 0.9% flush 3 mL  3 mL Intravenous PRN Volpi-Abadie, Jacqueline, MD         Current Outpatient Medications on File Prior to Encounter   Medication Sig Dispense Refill    acetaminophen (TYLENOL) 500 MG tablet Take 2 tablets (1,000 mg total) by mouth every 8 (eight) hours. 60 tablet 0    buPROPion (WELLBUTRIN SR) 150 MG TBSR 12 hr tablet Take 1 tablet (150 mg total) by mouth 2 (two) times daily. 180 tablet 0    cinacalcet (SENSIPAR) 30 MG Tab Take 1 tablet (30 mg total) by mouth daily with breakfast. 30 tablet 11    dicyclomine (BENTYL) 20 mg tablet Take 1 tablet (20 mg total) by mouth 2 (two) times daily as needed (abdominal pain). 180 tablet 0    famotidine (PEPCID) 40 MG tablet Take 1 tablet (40 mg total) by mouth once daily. 30 tablet 11    fruquintinib 5 mg Cap Take 1 tablet daily for days 1 through 21 and then repeat every 28 days 21 capsule 6    gabapentin (NEURONTIN) 300 MG capsule Take 1 capsule (300 mg total) by mouth every evening. 30 capsule 0    Lactobacillus rhamnosus GG (CULTURELLE) 10 billion cell capsule Take 1 capsule by mouth once daily.      lactulose (CHRONULAC) 10 gram/15 mL solution Take 15 mLs (10 g total) by mouth every 6 (six) hours as needed (constipation). 150 mL 1    levothyroxine (SYNTHROID) 175 MCG tablet Take 2 tablets (350 mcg total) by mouth before breakfast. 60 tablet 11    LIDOcaine-prilocaine (EMLA) cream Apply topically as needed (30 to 60 min prior chemo or port use). 30 g 3    LORazepam (ATIVAN) 1 MG tablet Take 1 tablet (1 mg total) by mouth every 12 (twelve) hours as needed for Anxiety (nausea). 30 tablet 0    magic mouthwash diphen/antac/lidoc/nysta Swish and swallow 10 ml by mouth 4 times daily 500 mL 6    mirtazapine (REMERON) 7.5 MG Tab Take 1 tablet (7.5 mg total) by mouth every evening.  (Patient taking differently: Take 7.5 mg by mouth nightly as needed.) 30 tablet 0    multivitamin (THERAGRAN) per tablet Take 1 tablet by mouth once daily.      olmesartan (BENICAR) 20 MG tablet Take 1 tablet (20 mg total) by mouth once daily. 30 tablet 5    ondansetron (ZOFRAN-ODT) 4 MG TbDL Take 1 tablet (4 mg total) by mouth every 8 (eight) hours as needed (Nausea). 20 tablet 0    ondansetron (ZOFRAN-ODT) 8 MG TbDL Take 1 tablet (8 mg total) by mouth every 8 (eight) hours as needed. 60 tablet 3    oxyCODONE (ROXICODONE) 5 MG immediate release tablet Take 1 tablet (5 mg total) by mouth every 4 (four) hours as needed for Pain (Abdominal pain). 8 tablet 0    potassium chloride (MICRO-K) 10 MEQ CpSR Take 4 capsules (40 mEq total) by mouth once daily. 120 capsule 3    prochlorperazine (COMPAZINE) 10 MG tablet Take 1 tablet (10 mg total) by mouth every 6 (six) hours as needed. 20 tablet 0    senna (SENOKOT) 8.6 mg tablet Take 2 tablets by mouth once daily. 60 tablet 2       Past Surgical History:   Procedure Laterality Date     SECTION, CLASSIC      x3    COLON SURGERY      COLONOSCOPY N/A 2020    Procedure: COLONOSCOPY;  Surgeon: Shane Parker MD;  Location: [facility];  Service: Endoscopy;  Laterality: N/A;    COLONOSCOPY N/A 2022    Procedure: COLONOSCOPY;  Surgeon: Shane Parker MD;  Location: [facility];  Service: Endoscopy;  Laterality: N/A;    COLONOSCOPY N/A 2023    Procedure: COLONOSCOPY;  Surgeon: Shane Parker MD;  Location: [facility];  Service: Endoscopy;  Laterality: N/A;  no cecum anastomosis    CYSTOSCOPY W/ URETERAL STENT PLACEMENT Bilateral 2020    Procedure: CYSTOSCOPY, WITH URETERAL STENT INSERTION;  Surgeon: Claudio Tyson MD;  Location: [address];  Service: Urology;  Laterality: Bilateral;    ESOPHAGOGASTRODUODENOSCOPY N/A 2024    Procedure: EGD (ESOPHAGOGASTRODUODENOSCOPY);  Surgeon: Shane Parker MD;  Location: [facility];  Service:  Gastroenterology;  Laterality: N/A;    EXAMINATION UNDER ANESTHESIA N/A 01/16/2024    Procedure: Exam under anesthesia-vagina;  Surgeon: Eli Her MD;  Location: [facility] OR;  Service: OB/GYN;  Laterality: N/A;    EXCISION-WIDE LOCAL Left 01/16/2024    Procedure: EXCISION-WIDE LOCAL -  Labia Majora;  Surgeon: Eli Her MD;  Location: [facility] OR;  Service: OB/GYN;  Laterality: Left;  upper left side of labia majora    EXCISIONAL BIOPSY, LYMPH NODE  07/2022    abdominal x 4    INSERTION OF TUNNELED CENTRAL VENOUS CATHETER (CVC) WITH SUBCUTANEOUS PORT N/A 05/01/2020    Procedure: [id number];  Surgeon: Doscaprice Diagnostic Provider;  Location: [facility] OR 2ND FLR;  Service: Radiology;  Laterality: N/A;  Room 189/Cindy    LAPAROSCOPIC SIGMOIDECTOMY N/A 03/25/2020    Procedure: COLECTOMY, SIGMOID, LAPAROSCOPIC, flex sig, ERAS high;  Surgeon: Silvio Man MD;  Location: [facility] OR 2ND FLR;  Service: Colon and Rectal;  Laterality: N/A;    MOBILIZATION OF SPLENIC FLEXURE  03/25/2020    Procedure: MOBILIZATION, SPLENIC FLEXURE;  Surgeon: Silvio Man MD;  Location: [facility] OR 2ND FLR;  Service: Colon and Rectal;;    TONSILLECTOMY      TOTAL ABDOMINAL HYSTERECTOMY W/ BILATERAL SALPINGOOPHORECTOMY N/A 03/25/2020    Procedure: HYSTERECTOMY, TOTAL, ABDOMINAL, WITH BILATERAL SALPINGO-OOPHORECTOMY;  Surgeon: Keron Brady MD;  Location: [facility] OR 2ND FLR;  Service: Oncology;  Laterality: N/A;       Social History     Socioeconomic History    Marital status:     Number of children: 3   Tobacco Use    Smoking status: Never    Smokeless tobacco: Never   Substance and Sexual Activity    Alcohol use: Yes     Comment: occasionally- twice monthly    Drug use: Never    Sexual activity: Yes     Partners: Male     Birth control/protection: See Surgical Hx   Social History Narrative        2 adult sons, 1 daughter     for [employer]     Social [employer]     Financial  Resource Strain: High Risk (2/19/2024)    Overall Financial Resource Strain (CARDIA)     Difficulty of Paying Living Expenses: Very hard   Food Insecurity: No Food Insecurity (2/19/2024)    Hunger Vital Sign     Worried About Running Out of Food in the Last Year: Never true     Ran Out of Food in the Last Year: Never true   Transportation Needs: No Transportation Needs (2/19/2024)    PRAPARE - Transportation     Lack of Transportation (Medical): No     Lack of Transportation (Non-Medical): No   Physical Activity: Insufficiently Active (2/19/2024)    Exercise Vital Sign     Days of Exercise per Week: 1 day     Minutes of Exercise per Session: 20 min   Stress: Stress Concern Present (2/19/2024)    [employer] - Occupational Stress Questionnaire     Feeling of Stress : To some extent   Social Connections: Unknown (2/19/2024)    Social Connection and Isolation Panel [NHANES]     Frequency of Communication with Friends and Family: More than three times a week     Frequency of Social Gatherings with Friends and Family: Twice a week     Active Member of Clubs or Organizations: No     Attends Club or Organization Meetings: Never     Marital Status:    Housing Stability: High Risk (2/19/2024)    Housing Stability Vital Sign     Unable to Pay for Housing in the Last Year: Yes     Number of Places Lived in the Last Year: 1     Unstable Housing in the Last Year: No         CBC:   Recent Labs     04/21/24  0547 04/22/24  0606   WBC 5.54 7.22   RBC 4.05 4.39   HGB 11.6* 12.7   HCT 37.9 40.8    398   MCV 94 93   MCH 28.6 28.9   MCHC 30.6* 31.1*       CMP:   Recent Labs     04/21/24  0547 04/22/24  0606   * 134*   K 4.3 4.4    110   CO2 13* 12*   BUN 36* 41*   CREATININE 3.5* 3.8*   GLU 98 92   MG 1.8 2.0   PHOS 5.3* 5.4*   CALCIUM 9.5 9.5   ALBUMIN 2.4* 2.5*   PROT 6.0 6.4   ALKPHOS 128 137*   ALT 8* 7*   AST 12 15   BILITOT 0.3 0.3       INR  Recent Labs     04/22/24  1302    INR 1.0                 Pre-op Assessment    I have reviewed the Patient Summary Reports.     I have reviewed the Nursing Notes. I have reviewed the NPO Status.      Review of Systems  Anesthesia Hx:  No problems with previous Anesthesia                Cardiovascular:     Hypertension                                        Pulmonary:      Shortness of breath                  Renal/:  Chronic Renal Disease                Hepatic/GI:    Hiatal Hernia,  Liver Disease,            Neurological:    Neuromuscular Disease,                                   Endocrine:   Hypothyroidism Hyperthyroidism             Physical Exam    Airway:  Mallampati: II   Mouth Opening: Normal  Tongue: Normal    Chest/Lungs:  Normal Respiratory Rate    Heart:  Rhythm: Regular Rhythm        Anesthesia Plan  Type of Anesthesia, risks & benefits discussed:    Anesthesia Type: Gen ETT  Intra-op Monitoring Plan: Standard ASA Monitors  Induction:  IV  Informed Consent: Informed consent signed with the Patient and all parties understand the risks and agree with anesthesia plan.  All questions answered.   ASA Score: 3    Ready For Surgery From Anesthesia Perspective.     .

## 2024-04-22 NOTE — PLAN OF CARE
- Per nursing report cystoscopy with stent placement this AM unsuccessful.  IR Nephrostomy tube placed bilaterally:       Right - pink  mL since placement       Left - light brown  mL since placement  - VSS.  On 2.5 LPM NC,patient is still drowsy at this time.  Monitor & wean O2 as tolerated  - Family at bedside updated on plan  - Keep bed low & locked, call light in reach, nonslip footwear in use, calls appropriately for assistance    Problem: Adult Inpatient Plan of Care  Goal: Plan of Care Review  Outcome: Ongoing, Progressing  Goal: Patient-Specific Goal (Individualized)  Outcome: Ongoing, Progressing  Goal: Absence of Hospital-Acquired Illness or Injury  Outcome: Ongoing, Progressing  Goal: Optimal Comfort and Wellbeing  Outcome: Ongoing, Progressing  Goal: Readiness for Transition of Care  Outcome: Ongoing, Progressing     Problem: Bariatric Environmental Safety  Goal: Safety Maintained with Care  Outcome: Ongoing, Progressing     Problem: Fluid and Electrolyte Imbalance (Acute Kidney Injury/Impairment)  Goal: Fluid and Electrolyte Balance  Outcome: Ongoing, Progressing     Problem: Oral Intake Inadequate (Acute Kidney Injury/Impairment)  Goal: Optimal Nutrition Intake  Outcome: Ongoing, Progressing     Problem: Renal Function Impairment (Acute Kidney Injury/Impairment)  Goal: Effective Renal Function  Outcome: Ongoing, Progressing     Problem: Infection  Goal: Absence of Infection Signs and Symptoms  Outcome: Ongoing, Progressing     Problem: Coping Ineffective (Oncology Care)  Goal: Effective Coping  Outcome: Ongoing, Progressing     Problem: Fatigue (Oncology Care)  Goal: Improved Activity Tolerance  Outcome: Ongoing, Progressing     Problem: Oral Intake Altered (Oncology Care)  Goal: Optimal Oral Intake  Outcome: Ongoing, Progressing     Problem: Pain Acute (Oncology Care)  Goal: Optimal Pain Control  Outcome: Ongoing, Progressing     Problem: Coping Ineffective  Goal: Effective  Coping  Outcome: Ongoing, Progressing     Problem: Skin Injury Risk Increased  Goal: Skin Health and Integrity  Outcome: Ongoing, Progressing

## 2024-04-22 NOTE — NURSING
Bilateral nephrostomy tube insertion complete. Pt tolerated well. VSS. No signs or symptoms of distress noted per CRNA. Pt will be transferred to PACU bed after extubation/stabilization escorted by RN and CRNA who will give report.   The stopcock is in the open position and urine is free flowing.  The stopcock MUST stay in the open position, do not close or turn off.   If for any reason the urine is NOT free flowing, please contact us immediately at [phone] and ask for IR PHYSICIAN ON CALL.   Overall weight in range. One low reading. [employer] weekly trend review. Continue to monitor.

## 2024-04-22 NOTE — NURSING TRANSFER
Nursing Transfer Note      4/22/2024   4:59 PM    Nurse giving handoff:ana rn  Nurse receiving handoff:11th floor rn    Reason patient is being transferred: post procedure    Transfer To: [id number]    Transfer via stretcher    Transfer with  to O2    Transported by transport      Any special needs or follow-up needed: na    Patient belongings transferred with patient: No    Chart send with patient: Yes    Notified: daughter    Patient reassessed at: 4/22/24 @ 7915

## 2024-04-22 NOTE — CONSULTS
Percutaneous Nephrostomy Tube Placement Consult Note  Interventional Radiology    Consult Requested By: Jessica Posadas MD  Reason for Consult: bilateral neph tubes.  bilateral hydro, metastatic rectal cancer.  Could not get access retrograde    SUBJECTIVE:     Chief Complaint:  moderate bilateral hydronephrosis    History of Present Illness:  Gail Mckinnon is a 54 y.o. female with a PMHx of metastatic sigmoid colon cancer, HTN, hypothyroidism, morbid obesity, and anxiety who presented to the ED on 4/19/24 for L hip pain 2/2 tumor involvement at L hip iliopsoas, was admitted for pain control and new MARILOU seen on admission labs. Hospital course notable for worsening MARILOU despite IVF, bilateral ureteral stent placement attempted on 4/22 but was unsuccessful. Interventional Radiology has been consulted for bilateral nephrostomy tube placement (with stent placement if possible) for management of  her bilateral moderate hydronephrosis . Pt has had recent imaging including a  retroperitoneal US  on 4/20/24 which revealed moderate bilateral hydronephrosis. Hydronephrosis c/f obstruction, possibly tumor related vs fibrosis per heme onc. Ureteral stent placement attempted earlier today, unable to locate either UO. The pt has not undergone nephrostomy tube placement in the past. The pt's Cr is currently 3.8 and is trending up. Baseline Cr 0.8-0.9. No concerns for sepsis at this time. The pt's WBC is 7.22, pt is afebrile. The pt is hemodynamically stable. She reports she is unable to tolerate lying prone due to difficult breathing.    Scheduled Meds:  Current Facility-Administered Medications   Medication Dose Route Frequency    acetaminophen  1,000 mg Oral Q8H    aluminum-magnesium hydroxide-simethicone  30 mL Oral QID (AC & HS)    buPROPion  150 mg Oral Daily    cinacalcet  30 mg Oral Daily with breakfast    famotidine (PF)        famotidine  20 mg Oral Daily    gabapentin  300 mg Oral QHS    heparin (porcine)  7,500 Units  Subcutaneous Q8H    levothyroxine  350 mcg Oral Before breakfast    mupirocin   Nasal BID    oxyCODONE  20 mg Oral Q12H    polyethylene glycol  17 g Oral Daily    predniSONE  5 mg Oral Daily    senna-docusate 8.6-50 mg  1 tablet Oral BID    sucralfate  1 g Oral QID     Continuous Infusions:  Current Facility-Administered Medications   Medication Dose Route Frequency Last Rate Last Admin     PRN Meds:  Current Facility-Administered Medications:     calcium carbonate, 1,000 mg, Oral, TID PRN    dextrose 10%, 12.5 g, Intravenous, PRN    dextrose 10%, 25 g, Intravenous, PRN    dicyclomine, 20 mg, Oral, BID PRN    famotidine (PF), , ,     fentaNYL, 25 mcg, Intravenous, Q5 Min PRN    glucagon (human recombinant), 1 mg, Intramuscular, PRN    glucose, 16 g, Oral, PRN    glucose, 24 g, Oral, PRN    haloperidol lactate, 0.5 mg, Intravenous, Q10 Min PRN    HYDROmorphone, 1 mg, Intravenous, Q4H PRN    LORazepam, 1 mg, Oral, Q12H PRN    mirtazapine, 7.5 mg, Oral, Nightly PRN    naloxone, 0.02 mg, Intravenous, PRN    ondansetron, 4 mg, Oral, Q8H PRN    oxyCODONE, 15 mg, Oral, Q4H PRN    prochlorperazine, 10 mg, Oral, Q6H PRN    sodium chloride 0.9%, 10 mL, Intravenous, Q12H PRN    sodium chloride 0.9%, 3 mL, Intravenous, Q4H PRN    Review of patient's allergies indicates:   Allergen Reactions    Oxaliplatin Other (See Comments)     Itchy hands, throat closed up, trouble hearing - during infusion    Penicillins Hives and Rash     childhood allergy         Past Medical History:   Diagnosis Date    Anxiety     Depression     FH: ovarian cancer 03/16/2020    Hx of psychiatric care     Effexor, Paxil, Lexapro, Zoloft, Wellbutrin, Trazodone Buspar    Hypertension     Hyperthyroidism     Hypothyroid     Kidney calculi     Malignant neoplasm of sigmoid colon 03/16/2020    Menorrhagia     Multinodular goiter 08/24/2012    Palpitation     Psychiatric problem     Venous insufficiency     Vulvar lesion      Past Surgical History:   Procedure  Laterality Date     SECTION, CLASSIC      x3    COLON SURGERY      COLONOSCOPY N/A 2020    Procedure: COLONOSCOPY;  Surgeon: Shane Parker MD;  Location: [facility] ENDO;  Service: Endoscopy;  Laterality: N/A;    COLONOSCOPY N/A 2022    Procedure: COLONOSCOPY;  Surgeon: Shane Parker MD;  Location: [facility] ENDO;  Service: Endoscopy;  Laterality: N/A;    COLONOSCOPY N/A 2023    Procedure: COLONOSCOPY;  Surgeon: Shane Parker MD;  Location: [facility];  Service: Endoscopy;  Laterality: N/A;  no cecum anastomosis    CYSTOSCOPY W/ URETERAL STENT PLACEMENT Bilateral 2020    Procedure: CYSTOSCOPY, WITH URETERAL STENT INSERTION;  Surgeon: Claudio Tyson MD;  [facility];  Service: Urology;  Laterality: Bilateral;    ESOPHAGOGASTRODUODENOSCOPY N/A 2024    Procedure: EGD (ESOPHAGOGASTRODUODENOSCOPY);  Surgeon: Shane Parker MD;  Location: [facility];  Service: Gastroenterology;  Laterality: N/A;    EXAMINATION UNDER ANESTHESIA N/A 2024    Procedure: Exam under anesthesia-vagina;  Surgeon: Eli Her MD;  Location: [facility] OR;  Service: OB/GYN;  Laterality: N/A;    EXCISION-WIDE LOCAL Left 2024    Procedure: EXCISION-WIDE LOCAL -  Labia Majora;  Surgeon: Eli Her MD;  Location: [facility] OR;  Service: OB/GYN;  Laterality: Left;  upper left side of labia majora    EXCISIONAL BIOPSY, LYMPH NODE  2022    abdominal x 4    INSERTION OF TUNNELED CENTRAL VENOUS CATHETER (CVC) WITH SUBCUTANEOUS PORT N/A 2020    Procedure: SXAHWHNMS-PVVM-X-CATH;  Surgeon: Dos Diagnostic Provider;  Location: [facility] OR 2ND FLR;  Service: Radiology;  Laterality: N/A;  Room 189/Cindy    LAPAROSCOPIC SIGMOIDECTOMY N/A 2020    Procedure: COLECTOMY, SIGMOID, LAPAROSCOPIC, flex sig, ERAS high;  Surgeon: Silvio Man MD;  Location: [facility] OR 2ND FLR;  Service: Colon and Rectal;  Laterality: N/A;    MOBILIZATION OF SPLENIC FLEXURE  2020    Procedure:  MOBILIZATION, SPLENIC FLEXURE;  Surgeon: Silvio Man MD;  [facility];  Service: Colon and Rectal;;    TONSILLECTOMY      TOTAL ABDOMINAL HYSTERECTOMY W/ BILATERAL SALPINGOOPHORECTOMY N/A 03/25/2020    Procedure: HYSTERECTOMY, TOTAL, ABDOMINAL, WITH BILATERAL SALPINGO-OOPHORECTOMY;  Surgeon: Keron Brady MD;  [facility];  Service: Oncology;  Laterality: N/A;     Family History   Problem Relation Name Age of Onset    Diabetes Mother  65    Heart disease Father      Drug abuse Brother      Drug abuse Brother      Cancer Maternal Aunt          lung cancer    Ovarian cancer Maternal Aunt Fawn     Ovarian cancer Maternal Aunt Adela     Colon cancer Paternal Uncle      Cancer Paternal Uncle      Cancer Maternal Grandmother          stomach cancer- started in ovaries    Ovarian cancer Maternal Grandmother          stomach cancer- started in ovaries    No Known Problems Daughter      Drug abuse Son      Drug abuse Son          clean/sober since 2012    Ovarian cancer Cousin Junie         mother had ovarian cancer    Uterine cancer Neg Hx      Breast cancer Neg Hx      Crohn's disease Neg Hx      Ulcerative colitis Neg Hx       Social History     Tobacco Use    Smoking status: Never    Smokeless tobacco: Never   Substance Use Topics    Alcohol use: Yes     Comment: occasionally- twice monthly    Drug use: Never       OBJECTIVE:     Vital Signs (Most Recent)  Temp: 97.7 °F (36.5 °C) (04/22/24 1005)  Pulse: 97 (04/22/24 1045)  Resp: 13 (04/22/24 1045)  BP: 124/60 (04/22/24 1045)  SpO2: 95 % (04/22/24 1045)    Physical Exam:  Physical Exam  Vitals and nursing note reviewed.   Constitutional:       General: She is not in acute distress.     Appearance: She is obese. She is not ill-appearing.   HENT:      Head: Normocephalic and atraumatic.      Right Ear: External ear normal.      Left Ear: External ear normal.   Eyes:      Extraocular Movements: Extraocular movements intact.       "Conjunctiva/sclera: Conjunctivae normal.      Pupils: Pupils are equal, round, and reactive to light.   Cardiovascular:      Rate and Rhythm: Normal rate.   Pulmonary:      Effort: Pulmonary effort is normal. No respiratory distress.   Abdominal:      General: Abdomen is flat.   Skin:     General: Skin is warm and dry.      Coloration: Skin is not jaundiced.   Neurological:      General: No focal deficit present.      Mental Status: She is oriented to person, place, and time.   Psychiatric:         Mood and Affect: Affect is tearful.         Laboratory  I have reviewed all pertinent lab results within the past 24 hours.  CBC:   Recent Labs   Lab 04/22/24  0606   WBC 7.22   RBC 4.39   HGB 12.7   HCT 40.8      MCV 93   MCH 28.9   MCHC 31.1*     BMP:   Recent Labs   Lab 04/22/24  0606   GLU 92   *   K 4.4      CO2 12*   BUN 41*   CREATININE 3.8*   CALCIUM 9.5   MG 2.0     CMP:   Recent Labs   Lab 04/22/24  0606   GLU 92   CALCIUM 9.5   ALBUMIN 2.5*   PROT 6.4   *   K 4.4   CO2 12*      BUN 41*   CREATININE 3.8*   ALKPHOS 137*   ALT 7*   AST 15   BILITOT 0.3     LFTs:   Recent Labs   Lab 04/22/24  0606   ALT 7*   AST 15   ALKPHOS 137*   BILITOT 0.3   PROT 6.4   ALBUMIN 2.5*     Coagulation: No results for input(s): "LABPROT", "INR", "APTT" in the last 168 hours.  Microbiology Results (last 7 days)       Procedure Component Value Units Date/Time    Urine culture [[id number]] Collected: 04/20/24 1550    Order Status: Completed Specimen: Urine Updated: 04/22/24 0007     Urine Culture, Routine Multiple organisms isolated. None in predominance.  Repeat if      clinically necessary.    Narrative:      Specimen Source->Urine    Urine culture [[id number]] Collected: 04/21/24 1800    Order Status: No result Specimen: Urine Updated: 04/21/24 2223            ASA/Mallampati  ASA: 3  Mallampati: 2    Imaging:  Recent imaging studies including  retroperitoneal US  on 4/20/24     EXAMINATION:  US " RETROPERITONEAL COMPLETE     CLINICAL HISTORY:  zulay workup;     TECHNIQUE:  Ultrasound of the kidneys and urinary bladder was performed including color flow and Doppler evaluation of the kidneys.     COMPARISON:  CT of the abdomen and pelvis performed 03/11/2024.  Abdominal ultrasound 02/13/2024.     FINDINGS:  Right kidney: The right kidney measures 13 cm. No cortical thinning. No loss of corticomedullary distinction. Resistive index measures 0.75.  No mass. 12 mm shadowing calculus at the mid to lower pole.  Mild to moderate hydronephrosis.     Left kidney: The left kidney measures 12.7 cm. No cortical thinning. No loss of corticomedullary distinction. Resistive index measures 0.69.  No mass. No renal stone. Mild to moderate hydronephrosis.     Bladder not distended.  Echogenic area measures on the order of 2.2 x 1.1 x 2.2 cm.     Splenic resistive index measures 0.65.     Impression:     Moderate bilateral hydronephrosis, corresponding to findings on prior CT performed 03/11/2024.     At least 1 nonobstructing right renal calculus is noted.  Additional smaller no calculi seen on prior CT are not well characterized.     Nonspecific increased attenuation dependently within the urinary bladder.  No definite internal vascularity on single provided image.  Findings are nonspecific and could relate to debris and/or hemorrhage.  Soft tissue mass would be difficult to exclude on limited images.  Cross-sectional imaging could be considered for further characterization.        Electronically signed by:Adrian Ortiz  Date:                                            04/20/2024  Time:                                           09:24    ASSESSMENT/PLAN:     Assessment:  54 y.o. female with a PMHx of metastatic sigmoid colon cancer, HTN, hypothyroidism, morbid obesity, and anxiety  who has been referred to IR for bilateral nephrostomy tube placement with stent placement if possible for management of  bilateral moderate  hydronephrosis .The procedure was discussed in great detail with the patient including thorough explanations of the potential risks and benefits of nephrostomy tube placement with stent placement. Risks include hematuria, pain, sepsis, injury to ureter or kidney, injury to adjacent organs, catheter dislodgement and inability to remove nephrostomy tube due to cystallization. The patient is a candidate for bilateral nephrostomy tube placement with stent placement if possible under general anesthesia. Anesthesia ordered due to pt unable to tolerate lying prone due to difficult breathing. Plan discussed with ordering physician.The pt verbalized understanding of the plan and would like to proceed.    Plan:  Will proceed with bilateral nephrostomy tube with stent placement if possible under general anesthesia on 4/22/24 pending anesthesia availability.   Please keep pt NPO   Anticoagulation history reviewed.   Coagulation labs reviewed.  Thank you for the consult. Please contact with questions via [employer] secure chat or spectra.    Belinda Muñoz PA-C  Interventional Radiology  Spectra: [id number]

## 2024-04-22 NOTE — SUBJECTIVE & OBJECTIVE
Interval History: NAOE, reports feeling well this AM, denies any symptoms other than mild heartburn. Has been NPO since midnight, feels ready for OR today.       Objective:     Temp:  [97.4 °F (36.3 °C)-98.1 °F (36.7 °C)] 97.8 °F (36.6 °C)  Pulse:  [] 97  Resp:  [16-18] 18  SpO2:  [91 %-96 %] 91 %  BP: (113-130)/(61-84) 130/84     Body mass index is 47.47 kg/m².           Drains       None                    Physical Exam  Constitutional:       General: She is not in acute distress.     Appearance: Normal appearance.   HENT:      Head: Normocephalic and atraumatic.      Nose: Nose normal.   Eyes:      Conjunctiva/sclera: Conjunctivae normal.   Cardiovascular:      Rate and Rhythm: Normal rate.   Pulmonary:      Effort: Pulmonary effort is normal. No respiratory distress.   Abdominal:      General: There is no distension.      Tenderness: There is no right CVA tenderness or left CVA tenderness.   Musculoskeletal:         General: No deformity.   Skin:     General: Skin is warm and dry.   Neurological:      General: No focal deficit present.      Mental Status: She is alert.   Psychiatric:         Mood and Affect: Mood normal.         Behavior: Behavior normal.           Significant Labs:    BMP:  Recent Labs   Lab 04/19/24 2300 04/20/24 0443 04/21/24  0547    136 132*   K 3.7 3.6 4.3    110 109   CO2 16* 15* 13*   BUN 27* 28* 36*   CREATININE 2.6* 2.8* 3.5*   CALCIUM 9.5 9.5 9.5       CBC:   Recent Labs   Lab 04/19/24 2300 04/20/24  0443 04/21/24  0547   WBC 5.31 5.48 5.54   HGB 12.6 12.9 11.6*   HCT 40.5 40.3 37.9    304 297       All pertinent labs results from the past 24 hours have been reviewed.    Significant Imaging:  All pertinent imaging results/findings from the past 24 hours have been reviewed.

## 2024-04-22 NOTE — TRANSFER OF CARE
"Anesthesia Transfer of Care Note    Patient: Gail Mckinnon    Procedure(s) Performed: * No procedures listed *    Patient location: PACU    Anesthesia Type: general    Transport from OR: Transported from OR on 6-10 L/min O2 by face mask with adequate spontaneous ventilation    Post pain: adequate analgesia    Post assessment: no apparent anesthetic complications and tolerated procedure well    Post vital signs: stable    Level of consciousness: sedated    Nausea/Vomiting: no nausea/vomiting    Complications: none    Transfer of care protocol was followed      Last vitals: Visit Vitals  BP (!) 100/57   Pulse 97   Temp 36.3 °C (97.3 °F)   Resp 14   Ht 5' 6" (1.676 m)   Wt 133.4 kg (294 lb 1.5 oz)   LMP 01/22/2020   SpO2 99%   Breastfeeding No   BMI 47.47 kg/m²     "

## 2024-04-22 NOTE — PROGRESS NOTES
[facility] ([facility])  Urology  Progress Note    Patient Name: Gail Mckinnon  MRN: [id number]  Admission Date: 4/19/2024  Hospital Length of Stay: 2 days  Code Status: Full Code   Attending Provider: Virgie Rice MD   Primary Care Physician: Marah Santo MD    Subjective:     HPI:  54yoF with sigmoid cancer (multiple prior treatments with surgery and chemotherapy, has had lap colectomy, MARCIAL/BSO, lysis of adhesions) with evidence of disease progression and active OP plans to start fruquintinib who presented to the ED with hip pain. Vitals stable, labs show MARILOU. CT shows  tumor involvement at left hip illiopsoas which may be contributing to pain. Patient admitted for pain management and MARILOU.     On chart review patient Cr baseline likely 0.6- 1.0, Cr in 03/2024 was 1.0, and on labs since has been rising with an acute rise over the past few days up to 3.5 today. Of note patient had unremarkable CT in 01/27/2024 with no hydronephrosis and mild nephrolithiasis. No hydronephrosis on US 2/13/24. On subsequent CT on 3/11/24 patient did have mild bilateral hydroureteronephrosis without obstructing calculi. On personal review of PET from 4/10/24 there appears to be increasing hydronephrosis and potentially a small calcification in the right ureter which may represent a ureteral calculus. Patient also had significant hydronephrosis on US yesterday.     Patient seen today, vitals stable. PVR 0. UA non-concerning for infection but contaminated. Patient denies any associated symptoms, no n/v/f/c/flank pain/hematuria/dysuria. Patient endorses being told she has a history of urolithiasis but has never had any intervention for it. Denies any history of prior abdominal or pelvic radiation. Urology consulted for MARILOU.     Interval History: NAOE, reports feeling well this AM, denies any symptoms other than mild heartburn. Has been NPO since midnight, feels ready for OR today.       Objective:     Temp:  [97.4 °F  (36.3 °C)-98.1 °F (36.7 °C)] 97.8 °F (36.6 °C)  Pulse:  [] 97  Resp:  [16-18] 18  SpO2:  [91 %-96 %] 91 %  BP: (113-130)/(61-84) 130/84     Body mass index is 47.47 kg/m².           Drains       None                    Physical Exam  Constitutional:       General: She is not in acute distress.     Appearance: Normal appearance.   HENT:      Head: Normocephalic and atraumatic.      Nose: Nose normal.   Eyes:      Conjunctiva/sclera: Conjunctivae normal.   Cardiovascular:      Rate and Rhythm: Normal rate.   Pulmonary:      Effort: Pulmonary effort is normal. No respiratory distress.   Abdominal:      General: There is no distension.      Tenderness: There is no right CVA tenderness or left CVA tenderness.   Musculoskeletal:         General: No deformity.   Skin:     General: Skin is warm and dry.   Neurological:      General: No focal deficit present.      Mental Status: She is alert.   Psychiatric:         Mood and Affect: Mood normal.         Behavior: Behavior normal.           Significant Labs:    BMP:  Recent Labs   Lab 04/19/24  2300 04/20/24  0443 04/21/24  0547    136 132*   K 3.7 3.6 4.3    110 109   CO2 16* 15* 13*   BUN 27* 28* 36*   CREATININE 2.6* 2.8* 3.5*   CALCIUM 9.5 9.5 9.5       CBC:   Recent Labs   Lab 04/19/24  2300 04/20/24  0443 04/21/24  0547   WBC 5.31 5.48 5.54   HGB 12.6 12.9 11.6*   HCT 40.5 40.3 37.9    304 297       All pertinent labs results from the past 24 hours have been reviewed.    Significant Imaging:  All pertinent imaging results/findings from the past 24 hours have been reviewed.                  Assessment/Plan:     MARILOU (acute kidney injury)  54yoF with worsening MARILOU in the setting of bilateral hydroureteronephrosis. Currently unclear etiology of hydronephrosis, may be component of potential right ureteral stone vs factor from prior abdominopelvic surgical history or cancer history.      - Given bilateral hydro and rising Cr, planning to go to OR today  for bilateral retrograde pyelograms and bilateral ureteral stent placement   - Consent obtained   - Repeat UA yesterday contaminated but low concern for infection   - NPO   - Strict I/O's   - Trend Cr    - Rest of care per primary        VTE Risk Mitigation (From admission, onward)           Ordered     heparin (porcine) injection 7,500 Units  Every 8 hours         04/20/24 0838     IP VTE HIGH RISK PATIENT  Once         04/20/24 0838     Place sequential compression device  Until discontinued         04/20/24 5947                    Tarik Roche MD  Urology  [facility] - Surgery (1st Fl)

## 2024-04-22 NOTE — NURSING TRANSFER
Nursing Transfer Note      4/22/2024   10:54 AM    Nurse giving handoff:CHANDRIKA Caraballo RN  Nurse receiving handoff:LAVINIA Martin    Reason patient is being transferred: post procedure    Transfer From [facility] to room 74417    Transfer via stretcher    Transfer with transport    Transported by transporter    Transfer Vital Signs:  Blood Pressure:124/60  Heart Rate:97  O2:98% sat on RA  Temperature:  Respirations:20    Telemetry: no  Order for Tele Monitor? no    Additional Lines: none    4eyes on Skin: no    Medicines sent: none    Any special needs or follow-up needed: none    Patient belongings transferred with patient: Belongings in patient room    Chart send with patient: yes    Notified: 4/22/24 at 1050. Report given to LAVINIA Martin on floor    Patient reassessed at: 4/22/2024 at 1039    Upon arrival to floor:

## 2024-04-23 NOTE — PLAN OF CARE
Pt. AAOx4. Afebrile. L nephrostomy . R Nephrostomy . Pain managed with scheduled and PRN pain meds, see mar. Bed in the lowest setting, call light within reach.

## 2024-04-23 NOTE — CONSULTS
[facility]  Radiation Oncology  Consult Note    Patient Name: Gail Mckinnon  MRN: [id number]  Admission Date: 4/19/2024  Hospital Length of Stay: 3 days  Code Status: Full Code   Attending Provider: Virgie Rice MD  Consulting Provider: Shruthi Vernon MD  Primary Care Physician: Marah Santo MD  Principal Problem:Hip pain    Inpatient consult to Radiation Oncology  Consult performed by: Shruthi Vernon MD  Consult ordered by: Dimitry Carrasco MD        Subjective:     HPI:  Ms. Mckinnon is a 54 year old female with stage IV colon cancer, originally diagnosed in 2020 and developed mets in 2021) who has been on systemic / immunotherapy, now with progression of disease and left hip pain over the last 3-4 weeks and anasarca Her most recent PET scan on 4/10/2024 reveals mixed response to therapy with interval resolution of the majority of the sites of uptake in mediastinal and hilar lymph nodes.  There is new or increasing activity in left supraclavicular nodes, internal mammary and epicardial nodes, left inguinal nodes, the left iliopsoas muscle.  Decreased size of metastatic celiac node within increasing FDG uptake.  Similar levels of activity in mesenteric and left pelvic sidewall nodes. She underwent bilateral nephrostomy tube placement due to ureteral obstruction due to fibrosis vs. Tumor.  Radiation Oncology is consulted regarding palliative radiation to the left inguinal / pelvic lymph nodes for pain control.    At present, she reports 7/10 pain in the left inguinal/lower back.  She was noted to have bilateral lower extremity edema and distended abdomen.  Nephrostomy tubes bilaterally draining serosanguineous fluid.         Assessment/Plan:     Malignant neoplasm of sigmoid colon  This is a 54 year old female with stage IV colon cancer with enlarging pelvic and inguinal lymph nodes with pain        Thank you for your consult. {SIGN OFF/FOLLOW-UP:49164}    Shruthi Vernon  MD  Radiation Oncology  [facility] - Trans[facility]down

## 2024-04-23 NOTE — PROGRESS NOTES
[facility]  Urology  Progress Note    Patient Name: Gail Mckinnon  MRN: [id number]  Admission Date: 4/19/2024  Hospital Length of Stay: 3 days  Code Status: Full Code   Attending Provider: Virgie Rice MD   Primary Care Physician: Marah Santo MD    Subjective:     HPI:  54yoF with sigmoid cancer (multiple prior treatments with surgery and chemotherapy, has had lap colectomy, MARCIAL/BSO, lysis of adhesions) with evidence of disease progression and active OP plans to start fruquintinib who presented to the ED with hip pain. Vitals stable, labs show MARILOU. CT shows  tumor involvement at left hip illiopsoas which may be contributing to pain. Patient admitted for pain management and MARILOU.     On chart review patient Cr baseline likely 0.6- 1.0, Cr in 03/2024 was 1.0, and on labs since has been rising with an acute rise over the past few days up to 3.5 today. Of note patient had unremarkable CT in 01/27/2024 with no hydronephrosis and mild nephrolithiasis. No hydronephrosis on US 2/13/24. On subsequent CT on 3/11/24 patient did have mild bilateral hydroureteronephrosis without obstructing calculi. On personal review of PET from 4/10/24 there appears to be increasing hydronephrosis and potentially a small calcification in the right ureter which may represent a ureteral calculus. Patient also had significant hydronephrosis on US yesterday.     Patient seen today, vitals stable. PVR 0. UA non-concerning for infection but contaminated. Patient denies any associated symptoms, no n/v/f/c/flank pain/hematuria/dysuria. Patient endorses being told she has a history of urolithiasis but has never had any intervention for it. Denies any history of prior abdominal or pelvic radiation. Urology consulted for MARILOU.     Interval History: She is s/p bilateral nephrostomy tube placement with IR. She is resting comfortably in bed. Denies abdominal pain and voiding spontaneously.     Review of Systems  "  Constitutional:  Negative for chills and fever.   Respiratory: Negative.     Cardiovascular: Negative.    Gastrointestinal: Negative.    Genitourinary: Negative.      Objective:     Temp:  [97.3 °F (36.3 °C)-98.5 °F (36.9 °C)] 97.8 °F (36.6 °C)  Pulse:  [] 93  Resp:  [13-25] 18  SpO2:  [91 %-99 %] 94 %  BP: ()/(55-99) 135/78     Body mass index is 47.4 kg/m².           Drains       Drain  Duration                  Nephrostomy 04/22/24 1546 Right 10 Fr. <1 day         Nephrostomy 04/22/24 1547 Left 10 Fr. <1 day                     Physical Exam  Vitals and nursing note reviewed.   Constitutional:       Appearance: Normal appearance.   HENT:      Head: Normocephalic and atraumatic.   Eyes:      Extraocular Movements: Extraocular movements intact.      Conjunctiva/sclera: Conjunctivae normal.      Pupils: Pupils are equal, round, and reactive to light.   Cardiovascular:      Rate and Rhythm: Normal rate.   Pulmonary:      Effort: Pulmonary effort is normal.   Abdominal:      General: Abdomen is flat.      Palpations: Abdomen is soft.      Comments: Bilateral Nephrostomy tubes in place, both with great output and flushed during morning rounds  Right Nephrostomy tube draining pink tinged urine   Left Nephrostomy tube draining clear yellow urine   Skin:     General: Skin is warm.   Neurological:      General: No focal deficit present.      Mental Status: She is alert and oriented to person, place, and time.   Psychiatric:         Mood and Affect: Mood normal.           Significant Labs:    BMP:  Recent Labs   Lab 04/20/24  0443 04/21/24  0547 04/22/24  0606    132* 134*   K 3.6 4.3 4.4    109 110   CO2 15* 13* 12*   BUN 28* 36* 41*   CREATININE 2.8* 3.5* 3.8*   CALCIUM 9.5 9.5 9.5       CBC:   Recent Labs   Lab 04/20/24  0443 04/21/24  0547 04/22/24  0606   WBC 5.48 5.54 7.22   HGB 12.9 11.6* 12.7   HCT 40.3 37.9 40.8    297 398       Blood Culture: No results for input(s): "LABBLOO" in " the last 168 hours.  Urine Culture:   Recent Labs   Lab 04/20/24  1550 04/21/24  1800   LABURIN Multiple organisms isolated. None in predominance.  Repeat if  clinically necessary. Multiple organisms isolated. None in predominance.  Repeat if  clinically necessary.     Urine Studies:   Recent Labs   Lab 04/20/24  1550 04/21/24  1800   COLORU Yellow Yellow   APPEARANCEUA Hazy* Hazy*   PHUR 6.0 5.0   SPECGRAV 1.020 1.020   PROTEINUA 1+* 1+*   GLUCUA Negative Negative   KETONESU Negative Negative   BILIRUBINUA Negative Negative   OCCULTUA Negative Negative   NITRITE Negative Negative   LEUKOCYTESUR 2+* 3+*   RBCUA 3 2   WBCUA 40* 45*   BACTERIA Occasional Few*   SQUAMEPITHEL 19 14   HYALINECASTS 0 0       Significant Imaging:  All pertinent imaging results/findings from the past 24 hours have been reviewed.                  Assessment/Plan:     MARILOU (acute kidney injury)  54yoF with worsening MARILOU in the setting of bilateral hydroureteronephrosis. Currently unclear etiology of hydronephrosis, may be component of potential right ureteral stone vs factor from prior abdominopelvic surgical history or cancer history.      - s/p bilateral nephrostomy tube placement with IR    - Maintain nephrostomy tubes    - Please make sure nephrostomy tubes are not kinked    - Creatinine 3.8 from a baseline of 0.6-1.0. Will follow up morning labs. Trending creatinine.    - Diet: Regular    - Strict I/O's   - Rest of care per primary           VTE Risk Mitigation (From admission, onward)           Ordered     heparin (porcine) injection 7,500 Units  Every 8 hours         04/20/24 0838     IP VTE HIGH RISK PATIENT  Once         04/20/24 0838     Place sequential compression device  Until discontinued         04/20/24 5244                    Mona Nguyễn MD  Urology  [facility] - [facility]

## 2024-04-23 NOTE — ASSESSMENT & PLAN NOTE
Pt reports decreased urine output and decreased PO intake    Creatinine 2.8 on admit, baseline around 0.9 - 1.0  - Likely pre-renal from dehydration and volume losses superimposed on post renal  - continuing to worsen, Urology unable to stent      Results:  US retroperitoneal: Moderate bilateral hydronephrosis, corresponding to findings on prior CT performed 03/11/2024.       Plan:   - S/p bilateral nephrostomy placement, monitor daily CMP, 1L IVF encourage PO Intake  - Strict I&Os and daily weights   - Avoid nephrotoxic agents such as NSAIDs, gadolinium and IV radiocontrast.  - Renally dose meds to current GFR.  - Maintain MAP > 65.

## 2024-04-23 NOTE — PROGRESS NOTES
[facility]  Palliative Medicine  Progress Note    Patient Name: Gail Mckinnon  MRN: [id number]  Admission Date: 4/19/2024  Hospital Length of Stay: 3 days  Code Status: Full Code   Attending Provider: Virgie Rice MD  Consulting Provider: Raquel Vicente MD  Primary Care Physician: Marah Santo MD  Principal Problem:Hip pain    Patient information was obtained from patient and primary team.      Assessment/Plan:     Palliative Care  Palliative care encounter  Ms. 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 Of note, patient's recent imaging showed evidence of progression. Outpatient plan to start fruquintinib. In ED patient noted to have MARILOU. Imaging showed tumor involvement at left hip iliopsoas. S/p attempt for bilateral ureteral stents that was unsuccessful, followed by IR-guided bilateral nephrostomy tube placements for hydronephrosis on 4/22/24. Patient admitted for pain management and MARILOU. Palliative Medicine consulted for symptom management.    Sigmoid cancer/MARILOU/bilateral hydronephrosis/HTN/thyroid problem/other medical problems  - plan per primary team and other speciality consultants (Urology)    GOC/ACP  - patient decisional and by herself in room  - no ACP documents uploaded into EMR  - patient follows with Palliative Medicine NP Claudio and Dr. Hill on [facility] already; next appt: 04/23/24   - On discharge, I will contact outpatient palliative medicine team on [facility] to arrange new follow up appointment  - NOK: patient's children  - On 04/20/2024 patient verbally stated she would want her middle son, Star Mckinnon, to be decision maker at [phone]  - will follow up at future visits to fill out ACP forms  - goals: improvement in symptoms to be able to go on cruise on Thursday 4/25/24 with friends; also continue life prolonging  measures  - code status not discussed    Cancer related pain  - patient reporting severe pain in left hip; she also has pain at times in abdomen/groin and acute tenderness from new bilateral nephrostomy tube placements  - 24 hour OME: 125   - outpatient regimen: oxycodone-apap  mg q4h prn, gabapentin 300 mg qhs, and flexeril 5 mg q8h prn. This was not providing any relief.  - currently only receiving relief from hydromorphone 1 mg IVP PRN  - recommend continue oxycodone to 15 mg q4h prn first line and continue hydromorphone 1 mg IVP as second line therapy  - continue Oxycontin 20 mg BID  - if okay with oncology team, continue low dose steroid such as prednisone 5 mg daily/bid for inflammatory pain given muscle involvement  - patient may also benefit from increase in gabapentin to 300 mg bid as kidney function allows    Nausea/Anorexia  - patient with increased nausea due to increased pain  - patient with poor appetite constantly  - would benefit from nutrition consult while in the hospital  - continue zofran 4 mg q8h prn and prochlorperazine 10 mg q6h prn    Constipation  - ongoing issue  - uses senna at home with some relief  - continue senna and miralax as ordered    Anxiety/depression/spiritual distress  - patient feeling very overwhelmed with news of progression  - on 4/21 patient spoke of possible urologic surgery (stents vs nephrostomy tubes). Patient feeling very overwhelmed by news of additional issues. Patient upset with ongoing bad news as well as possibility of not being able to make trip with her friends. Patient spoke about how she feels that this is her last trip she will be able to make, and she wants to be able to enjoy herself and the time she has left.   - she reports not feeling at peace spiritually  - 4/23: Emotional support provided in context of her malignancy and the barriers it has created in living her life (like attending cruise with friends this Thursday 4/25/24) and her  understandable fears related to perception from others related to opioids for appropriate treatment of neoplasm-related pain.  - continue Wellbutrin, lorazepam, and mirtazapine  - recommend scheduling mirtazapine 7.5 mg qhs        I will follow-up with patient. Please contact us if you have any additional questions.    Subjective:     Chief Complaint:   Chief Complaint   Patient presents with    Leg Pain     Left upper thigh pain and swelling. Difficulty ambulating. Denies trauma. On chemo for metastatic colon cancer.         HPI:   Ms. Mckinnon is a 53 y/o F with HTN, depression/anxiety, thyroid problem, and sigmoid carcinoma (s/p multiple lines of treatment) presented with worsening left hip pain. Of note, patient's recent imaging showed evidence of progression. Outpatient plan to start fruquintinib. In ED patient noted to have MARILOU. Imaging showed tumor involvement at left hip iliopsoas. Patient admitted for pain management and MARILOU. Palliative Medicine consulted for symptom management.    Hospital Course:  No notes on file    Interval History: Supportive conversation with Ms. Mckinnon at bedside. Sweet, warm and engaged. Her cousin joined us to visit her this morning.    Prior 24 hour MME:  In the OR, received fentanyl 25 mcg IV x3, 50 mcg IV x1  Oxycontin 20 mg BID x2  Oxycodone 15 mg PO x2    Medications:  Continuous Infusions:  Current Facility-Administered Medications   Medication Dose Route Frequency Last Rate Last Admin     Scheduled Meds:  Current Facility-Administered Medications   Medication Dose Route Frequency    acetaminophen  1,000 mg Oral Q8H    aluminum-magnesium hydroxide-simethicone  30 mL Oral QID (AC & HS)    buPROPion  150 mg Oral Daily    cinacalcet  30 mg Oral Daily with breakfast    famotidine  20 mg Oral Daily    gabapentin  300 mg Oral QHS    heparin (porcine)  7,500 Units Subcutaneous Q8H    levothyroxine  350 mcg Oral Before breakfast    mupirocin   Nasal BID    oxyCODONE  20 mg Oral Q12H     polyethylene glycol  17 g Oral Daily    predniSONE  5 mg Oral Daily    senna-docusate 8.6-50 mg  1 tablet Oral BID    sucralfate  1 g Oral QID     PRN Meds:  Current Facility-Administered Medications:     calcium carbonate, 1,000 mg, Oral, TID PRN    dextrose 10%, 12.5 g, Intravenous, PRN    dextrose 10%, 25 g, Intravenous, PRN    dicyclomine, 20 mg, Oral, BID PRN    fentaNYL, 25 mcg, Intravenous, Q5 Min PRN    glucagon (human recombinant), 1 mg, Intramuscular, PRN    glucose, 16 g, Oral, PRN    glucose, 24 g, Oral, PRN    HYDROmorphone, 1 mg, Intravenous, Q4H PRN    lactulose, 10 g, Oral, Q6H PRN    LORazepam, 1 mg, Oral, Q12H PRN    mirtazapine, 7.5 mg, Oral, Nightly PRN    naloxone, 0.02 mg, Intravenous, PRN    ondansetron, 4 mg, Oral, Q8H PRN    ondansetron, 4 mg, Intravenous, Daily PRN    oxyCODONE, 15 mg, Oral, Q4H PRN    prochlorperazine, 10 mg, Oral, Q6H PRN    sodium chloride 0.9%, 10 mL, Intravenous, Q12H PRN    Objective:     Vital Signs (Most Recent):  Temp: 97.7 °F (36.5 °C) (04/23/24 0754)  Pulse: 99 (04/23/24 0754)  Resp: (!) 8 (04/23/24 0912)  BP: 126/66 (04/23/24 0754)  SpO2: 96 % (04/23/24 0754) Vital Signs (24h Range):  Temp:  [97.3 °F (36.3 °C)-98.5 °F (36.9 °C)] 97.7 °F (36.5 °C)  Pulse:  [] 99  Resp:  [8-25] 8  SpO2:  [91 %-99 %] 96 %  BP: ()/(55-99) 126/66     Weight: 133.2 kg (293 lb 10.4 oz)  Body mass index is 47.4 kg/m².       Physical Exam  Vitals reviewed.   Constitutional:       General: She is not in acute distress.     Appearance: She is not toxic-appearing or diaphoretic.   HENT:      Head: Normocephalic and atraumatic.      Right Ear: External ear normal.      Left Ear: External ear normal.      Nose: Nose normal.      Mouth/Throat:      Mouth: Mucous membranes are moist.   Eyes:      General: No scleral icterus.        Right eye: No discharge.         Left eye: No discharge.      Extraocular Movements: Extraocular movements intact.   Neck:      Comments: Trachea  midline  Cardiovascular:      Rate and Rhythm: Normal rate.   Pulmonary:      Effort: Pulmonary effort is normal. No respiratory distress.   Abdominal:      Palpations: Abdomen is soft.   Musculoskeletal:         General: No deformity or signs of injury.      Cervical back: Normal range of motion.   Skin:     General: Skin is dry.      Coloration: Skin is not jaundiced.      Findings: No rash.   Neurological:      General: No focal deficit present.      Mental Status: She is alert and oriented to person, place, and time.      Cranial Nerves: No cranial nerve deficit.   Psychiatric:         Mood and Affect: Mood is anxious and depressed. Affect is tearful.         Behavior: Behavior normal.         Judgment: Judgment normal.            Review of Symptoms      Symptom Assessment (ESAS 0-10 Scale)  Pain:  8  Dyspnea:  0  Anxiety:  8  Nausea:  0  Depression:  8  Anorexia:  5  Fatigue:  6  Insomnia:  0  Restlessness:  0  Agitation:  0     CAM / Delirium:  Negative  Constipation:  Positive  Diarrhea:  Negative      Bowel Management Plan (BMP):  Yes      Pain Assessment:  OME in 24 hours:  125  Location(s): leg    Leg       Location: left        Quality: Aching, burning, sharp and pressure-like        Quantity: 8/10 in intensity        Chronicity: Onset 1 (greater than) week(s) ago, unchanged        Aggravating Factors: Activity and walking        Alleviating Factors: Opiates        Associated Symptoms: None    Modified Ramona Scale:  0    ECOG Performance Status rdGrdrrdarddrderd:rd rd3rd Living Arrangements:  Lives with family and Lives in home    Psychosocial/Cultural:   See Palliative Psychosocial Note: No  Social Issues Identified: Coping deficit pt/family and Mental Health  Bereavement Risk: No  Caregiver Needs Discussed. Caregiver Distress: No: n/a  Cultural: patient enjoys shopping; she has three children (2 sons and 1 daughter). Has good support with her mother and close friends.   **Primary  to Follow**  Palliative  "Care  Consult: No    Spiritual:  F - Marilyn and Belief:  Yes  I - Importance:  Yes  C - Community:  Has   A - Address in Care:  In distress        Advance Care Planning  Advance Directives:   Living Will: No    LaPOST: No    Do Not Resuscitate Status: No    Medical Power of : No        Oral Declaration: Yes  Agent's Name:  Star Mckinnon   Agent's Contact Number:  [phone]    Decision Making:  Patient answered questions  Goals of Care: What is most important right now is to focus on remaining as independent as possible, symptom/pain control, extending life as long as possible, even it it means sacrificing quality. Accordingly, we have decided that the best plan to meet the patient's goals includes continuing with treatment.         Significant Labs: All pertinent labs within the past 24 hours have been reviewed.  CBC:   Recent Labs   Lab 04/23/24 0621   WBC 6.91   HGB 11.6*   HCT 37.4   MCV 92        BMP:  Recent Labs   Lab 04/23/24 0621         K 4.6   *   CO2 14*   BUN 46*   CREATININE 3.9*   CALCIUM 9.8   MG 2.2     LFT:  Lab Results   Component Value Date    AST 13 04/23/2024    ALKPHOS 123 04/23/2024    BILITOT 0.3 04/23/2024     Albumin:   Albumin   Date Value Ref Range Status   04/23/2024 2.4 (L) 3.5 - 5.2 g/dL Final     Protein:   Total Protein   Date Value Ref Range Status   04/23/2024 6.2 6.0 - 8.4 g/dL Final     Lactic acid:   No results found for: "LACTATE"    Significant Imaging: I have reviewed all pertinent imaging results/findings within the past 24 hours.    04/20/2024 US retroperitoneal: "Moderate bilateral hydronephrosis, corresponding to findings on prior CT performed 03/11/2024. At least 1 nonobstructing right renal calculus is noted.  Additional smaller no calculi seen on prior CT are not well characterized. Nonspecific increased attenuation dependently within the urinary bladder.  No definite internal vascularity on single provided " "image.  Findings are nonspecific and could relate to debris and/or hemorrhage.  Soft tissue mass would be difficult to exclude on limited images.  Cross-sectional imaging could be considered for further characterization."       I spent a total of 50 minutes on the day of the visit. This includes face to face time in discussion of goals of care, symptom assessment, coordination of care and emotional support. This also includes non-face to face time preparing to see the patient (eg, review of tests/imaging), obtaining and/or reviewing separately obtained history, documenting clinical information in the electronic or other health record, independently interpreting results and communicating results to the patient/family/caregiver, or care coordinator.     Raquel Vicente MD  Palliative Medicine  [facility] - Transplant Stepdown                "

## 2024-04-23 NOTE — ASSESSMENT & PLAN NOTE
This is a 54 year old female with stage IV colon cancer with enlarging pelvic and inguinal lymph nodes with pain.     She will benefit from palliative radiation to the left pelvis/ inguinal lymphadenopathy for pain control.  I plan to deliver 8 Gy x 1. I plan to obtain treatment planning CT while inpatient prior to treatment. Please call if questions. [phone]

## 2024-04-23 NOTE — PLAN OF CARE
- Nephrostomy tube placed bilaterally:       Right - red  mL       Left - yellow to pink  mL  - Mapping for radiation oncology completed.  Family at bedside updated on plan  - Keep bed low & locked, call light in reach, nonslip footwear in use, calls appropriately for assistance  - Pain management    Problem: Adult Inpatient Plan of Care  Goal: Plan of Care Review  Outcome: Progressing  Goal: Patient-Specific Goal (Individualized)  Outcome: Progressing  Goal: Absence of Hospital-Acquired Illness or Injury  Outcome: Progressing  Goal: Optimal Comfort and Wellbeing  Outcome: Progressing  Goal: Readiness for Transition of Care  Outcome: Progressing     Problem: Bariatric Environmental Safety  Goal: Safety Maintained with Care  Outcome: Progressing     Problem: Fluid and Electrolyte Imbalance (Acute Kidney Injury/Impairment)  Goal: Fluid and Electrolyte Balance  Outcome: Progressing     Problem: Renal Function Impairment (Acute Kidney Injury/Impairment)  Goal: Effective Renal Function  Outcome: Progressing     Problem: Infection  Goal: Absence of Infection Signs and Symptoms  Outcome: Progressing     Problem: Coping Ineffective (Oncology Care)  Goal: Effective Coping  Outcome: Progressing     Problem: Fatigue (Oncology Care)  Goal: Improved Activity Tolerance  Outcome: Progressing     Problem: Oral Intake Altered (Oncology Care)  Goal: Optimal Oral Intake  Outcome: Progressing     Problem: Oral Mucositis (Oncology Care)  Goal: Improved Oral Mucous Membrane Integrity  Outcome: Progressing     Problem: Pain Acute (Oncology Care)  Goal: Optimal Pain Control  Outcome: Progressing     Problem: Coping Ineffective  Goal: Effective Coping  Outcome: Progressing     Problem: Skin Injury Risk Increased  Goal: Skin Health and Integrity  Outcome: Progressing     Problem: Wound  Goal: Optimal Coping  Outcome: Progressing  Goal: Optimal Functional Ability  Outcome: Progressing  Goal: Absence of Infection Signs and  Symptoms  Outcome: Progressing  Goal: Improved Oral Intake  Outcome: Progressing  Goal: Optimal Pain Control and Function  Outcome: Progressing  Goal: Skin Health and Integrity  Outcome: Progressing  Goal: Optimal Wound Healing  Outcome: Progressing

## 2024-04-23 NOTE — ASSESSMENT & PLAN NOTE
Ms. 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 Of note, patient's recent imaging showed evidence of progression. Outpatient plan to start fruquintinib. In ED patient noted to have MARILOU. Imaging showed tumor involvement at left hip iliopsoas. S/p attempt for bilateral ureteral stents that was unsuccessful, followed by IR-guided bilateral nephrostomy tube placements for hydronephrosis on 4/22/24. Patient admitted for pain management and MARILOU. Palliative Medicine consulted for symptom management.    Sigmoid cancer/MARILOU/bilateral hydronephrosis/HTN/thyroid problem/other medical problems  - plan per primary team and other speciality consultants (Urology)    GOC/ACP  - patient decisional and by herself in room  - no ACP documents uploaded into EMR  - patient follows with Palliative Medicine NP Claudio and Dr. Hill on [facility] already; next appt: 04/23/24   - On discharge, I will contact outpatient palliative medicine team on [place] to arrange new follow up appointment  - NOK: patient's children  - On 04/20/2024 patient verbally stated she would want her middle son, Star Mckinnon, to be decision maker at [phone]  - will follow up at future visits to fill out ACP forms  - goals: improvement in symptoms to be able to go on cruise on Thursday 4/25/24 with friends; also continue life prolonging measures  - code status not discussed    Cancer related pain  - patient reporting severe pain in left hip; she also has pain at times in abdomen/groin and acute tenderness from new bilateral nephrostomy tube placements  - 24 hour OME: 125   - outpatient regimen: oxycodone-apap  mg q4h prn, gabapentin 300 mg qhs, and flexeril 5 mg q8h prn. This was not providing any relief.  - currently only receiving relief from hydromorphone 1 mg IVP PRN  - recommend continue oxycodone to 15 mg q4h prn first  line and continue hydromorphone 1 mg IVP as second line therapy  - continue Oxycontin 20 mg BID  - if okay with oncology team, continue low dose steroid such as prednisone 5 mg daily/bid for inflammatory pain given muscle involvement  - patient may also benefit from increase in gabapentin to 300 mg bid as kidney function allows    Nausea/Anorexia  - patient with increased nausea due to increased pain  - patient with poor appetite constantly  - would benefit from nutrition consult while in the hospital  - continue zofran 4 mg q8h prn and prochlorperazine 10 mg q6h prn    Constipation  - ongoing issue  - uses senna at home with some relief  - continue senna and miralax as ordered    Anxiety/depression/spiritual distress  - patient feeling very overwhelmed with news of progression  - on 4/21 patient spoke of possible urologic surgery (stents vs nephrostomy tubes). Patient feeling very overwhelmed by news of additional issues. Patient upset with ongoing bad news as well as possibility of not being able to make trip with her friends. Patient spoke about how she feels that this is her last trip she will be able to make, and she wants to be able to enjoy herself and the time she has left.   - she reports not feeling at peace spiritually  - 4/23: Emotional support provided in context of her malignancy and the barriers it has created in living her life (like attending cruise with friends this Thursday 4/25/24) and her understandable fears related to perception from others related to opioids for appropriate treatment of neoplasm-related pain.  - continue Wellbutrin, lorazepam, and mirtazapine  - recommend scheduling mirtazapine 7.5 mg qhs

## 2024-04-23 NOTE — SUBJECTIVE & OBJECTIVE
Interval History: She is s/p bilateral nephrostomy tube placement with IR. She is resting comfortably in bed. Denies abdominal pain and voiding spontaneously.     Review of Systems   Constitutional:  Negative for chills and fever.   Respiratory: Negative.     Cardiovascular: Negative.    Gastrointestinal: Negative.    Genitourinary: Negative.      Objective:     Temp:  [97.3 °F (36.3 °C)-98.5 °F (36.9 °C)] 97.8 °F (36.6 °C)  Pulse:  [] 93  Resp:  [13-25] 18  SpO2:  [91 %-99 %] 94 %  BP: ()/(55-99) 135/78     Body mass index is 47.4 kg/m².           Drains       Drain  Duration                  Nephrostomy 04/22/24 1546 Right 10 Fr. <1 day         Nephrostomy 04/22/24 1547 Left 10 Fr. <1 day                     Physical Exam  Vitals and nursing note reviewed.   Constitutional:       Appearance: Normal appearance.   HENT:      Head: Normocephalic and atraumatic.   Eyes:      Extraocular Movements: Extraocular movements intact.      Conjunctiva/sclera: Conjunctivae normal.      Pupils: Pupils are equal, round, and reactive to light.   Cardiovascular:      Rate and Rhythm: Normal rate.   Pulmonary:      Effort: Pulmonary effort is normal.   Abdominal:      General: Abdomen is flat.      Palpations: Abdomen is soft.      Comments: Bilateral Nephrostomy tubes in place, both with great output and flushed during morning rounds  Right Nephrostomy tube draining pink tinged urine   Left Nephrostomy tube draining clear yellow urine   Skin:     General: Skin is warm.   Neurological:      General: No focal deficit present.      Mental Status: She is alert and oriented to person, place, and time.   Psychiatric:         Mood and Affect: Mood normal.           Significant Labs:    BMP:  Recent Labs   Lab 04/20/24  0443 04/21/24  0547 04/22/24  0606    132* 134*   K 3.6 4.3 4.4    109 110   CO2 15* 13* 12*   BUN 28* 36* 41*   CREATININE 2.8* 3.5* 3.8*   CALCIUM 9.5 9.5 9.5       CBC:   Recent Labs   Lab  "04/20/24  0443 04/21/24  0547 04/22/24  0606   WBC 5.48 5.54 7.22   HGB 12.9 11.6* 12.7   HCT 40.3 37.9 40.8    297 398       Blood Culture: No results for input(s): "LABBLOO" in the last 168 hours.  Urine Culture:   Recent Labs   Lab 04/20/24  1550 04/21/24  1800   LABURIN Multiple organisms isolated. None in predominance.  Repeat if  clinically necessary. Multiple organisms isolated. None in predominance.  Repeat if  clinically necessary.     Urine Studies:   Recent Labs   Lab 04/20/24  1550 04/21/24  1800   COLORU Yellow Yellow   APPEARANCEUA Hazy* Hazy*   PHUR 6.0 5.0   SPECGRAV 1.020 1.020   PROTEINUA 1+* 1+*   GLUCUA Negative Negative   KETONESU Negative Negative   BILIRUBINUA Negative Negative   OCCULTUA Negative Negative   NITRITE Negative Negative   LEUKOCYTESUR 2+* 3+*   RBCUA 3 2   WBCUA 40* 45*   BACTERIA Occasional Few*   SQUAMEPITHEL 19 14   HYALINECASTS 0 0       Significant Imaging:  All pertinent imaging results/findings from the past 24 hours have been reviewed.                "

## 2024-04-23 NOTE — HPI
Ms. Mckinnon is a 54 year old female with stage IV colon cancer, originally diagnosed in 2020 and developed mets in 2021) who has been on systemic / immunotherapy, now with progression of disease and left hip pain over the last 3-4 weeks and anasarca Her most recent PET scan on 4/10/2024 reveals mixed response to therapy with interval resolution of the majority of the sites of uptake in mediastinal and hilar lymph nodes.  There is new or increasing activity in left supraclavicular nodes, internal mammary and epicardial nodes, left inguinal nodes, the left iliopsoas muscle.  Decreased size of metastatic celiac node within increasing FDG uptake.  Similar levels of activity in mesenteric and left pelvic sidewall nodes. She underwent bilateral nephrostomy tube placement due to ureteral obstruction due to fibrosis vs. Tumor.  Radiation Oncology is consulted regarding palliative radiation to the left inguinal / pelvic lymph nodes for pain control.    At present, she reports 7/10 pain in the left inguinal/lower back.  She was noted to have bilateral lower extremity edema and distended abdomen.  Nephrostomy tubes bilaterally draining serosanguineous fluid.

## 2024-04-23 NOTE — ASSESSMENT & PLAN NOTE
54yoF with worsening MARILOU in the setting of bilateral hydroureteronephrosis. Currently unclear etiology of hydronephrosis, may be component of potential right ureteral stone vs factor from prior abdominopelvic surgical history or cancer history.      - s/p bilateral nephrostomy tube placement with IR    - Maintain nephrostomy tubes    - Please make sure nephrostomy tubes are not kinked    - Creatinine 3.8 from a baseline of 0.6-1.0. Will follow up morning labs. Trending creatinine.    - Diet: Regular    - Strict I/O's   - Rest of care per primary

## 2024-04-23 NOTE — SUBJECTIVE & OBJECTIVE
Interval History: Supportive conversation with Ms. Mckinnon at bedside. Sweet, warm and engaged. Her cousin joined us to visit her this morning.    Prior 24 hour MME:  In the OR, received fentanyl 25 mcg IV x3, 50 mcg IV x1  Oxycontin 20 mg BID x2  Oxycodone 15 mg PO x2    Medications:  Continuous Infusions:  Current Facility-Administered Medications   Medication Dose Route Frequency Last Rate Last Admin     Scheduled Meds:  Current Facility-Administered Medications   Medication Dose Route Frequency    acetaminophen  1,000 mg Oral Q8H    aluminum-magnesium hydroxide-simethicone  30 mL Oral QID (AC & HS)    buPROPion  150 mg Oral Daily    cinacalcet  30 mg Oral Daily with breakfast    famotidine  20 mg Oral Daily    gabapentin  300 mg Oral QHS    heparin (porcine)  7,500 Units Subcutaneous Q8H    levothyroxine  350 mcg Oral Before breakfast    mupirocin   Nasal BID    oxyCODONE  20 mg Oral Q12H    polyethylene glycol  17 g Oral Daily    predniSONE  5 mg Oral Daily    senna-docusate 8.6-50 mg  1 tablet Oral BID    sucralfate  1 g Oral QID     PRN Meds:  Current Facility-Administered Medications:     calcium carbonate, 1,000 mg, Oral, TID PRN    dextrose 10%, 12.5 g, Intravenous, PRN    dextrose 10%, 25 g, Intravenous, PRN    dicyclomine, 20 mg, Oral, BID PRN    fentaNYL, 25 mcg, Intravenous, Q5 Min PRN    glucagon (human recombinant), 1 mg, Intramuscular, PRN    glucose, 16 g, Oral, PRN    glucose, 24 g, Oral, PRN    HYDROmorphone, 1 mg, Intravenous, Q4H PRN    lactulose, 10 g, Oral, Q6H PRN    LORazepam, 1 mg, Oral, Q12H PRN    mirtazapine, 7.5 mg, Oral, Nightly PRN    naloxone, 0.02 mg, Intravenous, PRN    ondansetron, 4 mg, Oral, Q8H PRN    ondansetron, 4 mg, Intravenous, Daily PRN    oxyCODONE, 15 mg, Oral, Q4H PRN    prochlorperazine, 10 mg, Oral, Q6H PRN    sodium chloride 0.9%, 10 mL, Intravenous, Q12H PRN    Objective:     Vital Signs (Most Recent):  Temp: 97.7 °F (36.5 °C) (04/23/24 0754)  Pulse: 99 (04/23/24  0754)  Resp: (!) 8 (04/23/24 0912)  BP: 126/66 (04/23/24 0754)  SpO2: 96 % (04/23/24 0754) Vital Signs (24h Range):  Temp:  [97.3 °F (36.3 °C)-98.5 °F (36.9 °C)] 97.7 °F (36.5 °C)  Pulse:  [] 99  Resp:  [8-25] 8  SpO2:  [91 %-99 %] 96 %  BP: ()/(55-99) 126/66     Weight: 133.2 kg (293 lb 10.4 oz)  Body mass index is 47.4 kg/m².       Physical Exam  Vitals reviewed.   Constitutional:       General: She is not in acute distress.     Appearance: She is not toxic-appearing or diaphoretic.   HENT:      Head: Normocephalic and atraumatic.      Right Ear: External ear normal.      Left Ear: External ear normal.      Nose: Nose normal.      Mouth/Throat:      Mouth: Mucous membranes are moist.   Eyes:      General: No scleral icterus.        Right eye: No discharge.         Left eye: No discharge.      Extraocular Movements: Extraocular movements intact.   Neck:      Comments: Trachea midline  Cardiovascular:      Rate and Rhythm: Normal rate.   Pulmonary:      Effort: Pulmonary effort is normal. No respiratory distress.   Abdominal:      Palpations: Abdomen is soft.   Musculoskeletal:         General: No deformity or signs of injury.      Cervical back: Normal range of motion.   Skin:     General: Skin is dry.      Coloration: Skin is not jaundiced.      Findings: No rash.   Neurological:      General: No focal deficit present.      Mental Status: She is alert and oriented to person, place, and time.      Cranial Nerves: No cranial nerve deficit.   Psychiatric:         Mood and Affect: Mood is anxious and depressed. Affect is tearful.         Behavior: Behavior normal.         Judgment: Judgment normal.            Review of Symptoms      Symptom Assessment (ESAS 0-10 Scale)  Pain:  8  Dyspnea:  0  Anxiety:  8  Nausea:  0  Depression:  8  Anorexia:  5  Fatigue:  6  Insomnia:  0  Restlessness:  0  Agitation:  0     CAM / Delirium:  Negative  Constipation:  Positive  Diarrhea:  Negative      Bowel Management Plan  (BMP):  Yes      Pain Assessment:  OME in 24 hours:  125  Location(s): leg    Leg       Location: left        Quality: Aching, burning, sharp and pressure-like        Quantity: 8/10 in intensity        Chronicity: Onset 1 (greater than) week(s) ago, unchanged        Aggravating Factors: Activity and walking        Alleviating Factors: Opiates        Associated Symptoms: None    Modified Ramona Scale:  0    ECOG Performance Status stGstrstastdstest:st st1st Living Arrangements:  Lives with family and Lives in home    Psychosocial/Cultural:   See Palliative Psychosocial Note: No  Social Issues Identified: Coping deficit pt/family and Mental Health  Bereavement Risk: No  Caregiver Needs Discussed. Caregiver Distress: No: n/a  Cultural: patient enjoys shopping; she has three children (2 sons and 1 daughter). Has good support with her mother and close friends.   **Primary  to Follow**  Palliative Care  Consult: No    Spiritual:  F - Marilyn and Belief:  Yes  I - Importance:  Yes  C - Community:  Has   A - Address in Care:  In distress        Advance Care Planning   Advance Directives:   Living Will: No    LaPOST: No    Do Not Resuscitate Status: No    Medical Power of : No        Oral Declaration: Yes  Agent's Name:  Star Mckinnon   Agent's Contact Number:  [phone]    Decision Making:  Patient answered questions  Goals of Care: What is most important right now is to focus on remaining as independent as possible, symptom/pain control, extending life as long as possible, even it it means sacrificing quality. Accordingly, we have decided that the best plan to meet the patient's goals includes continuing with treatment.         Significant Labs: All pertinent labs within the past 24 hours have been reviewed.  CBC:   Recent Labs   Lab 04/23/24 0621   WBC 6.91   HGB 11.6*   HCT 37.4   MCV 92        BMP:  Recent Labs   Lab 04/23/24 0621         K 4.6   *   CO2 14*   BUN  "46*   CREATININE 3.9*   CALCIUM 9.8   MG 2.2     LFT:  Lab Results   Component Value Date    AST 13 04/23/2024    ALKPHOS 123 04/23/2024    BILITOT 0.3 04/23/2024     Albumin:   Albumin   Date Value Ref Range Status   04/23/2024 2.4 (L) 3.5 - 5.2 g/dL Final     Protein:   Total Protein   Date Value Ref Range Status   04/23/2024 6.2 6.0 - 8.4 g/dL Final     Lactic acid:   No results found for: "LACTATE"    Significant Imaging: I have reviewed all pertinent imaging results/findings within the past 24 hours.    04/20/2024 US retroperitoneal: "Moderate bilateral hydronephrosis, corresponding to findings on prior CT performed 03/11/2024. At least 1 nonobstructing right renal calculus is noted.  Additional smaller no calculi seen on prior CT are not well characterized. Nonspecific increased attenuation dependently within the urinary bladder.  No definite internal vascularity on single provided image.  Findings are nonspecific and could relate to debris and/or hemorrhage.  Soft tissue mass would be difficult to exclude on limited images.  Cross-sectional imaging could be considered for further characterization."   "

## 2024-04-23 NOTE — CONSULTS
[facility] - Transplant Stepdown  Radiation Oncology  Consult Note    Patient Name: Gail Mckinnon  MRN: [id number]  Admission Date: 4/19/2024  Hospital Length of Stay: 3 days  Code Status: Full Code   Attending Provider: Virgie Rice MD  Consulting Provider: Shruthi Vernon MD  Primary Care Physician: Marah Santo MD  Principal Problem:Hip pain    Inpatient consult to Radiation Oncology  Consult performed by: Shruthi Vernon MD  Consult ordered by: Dimitry Carrasco MD        Subjective:     HPI:  Ms. Mckinnon is a 54 year old female with stage IV colon cancer, originally diagnosed in 2020 and developed mets in 2021) who has been on systemic / immunotherapy, now with progression of disease and left hip pain over the last 3-4 weeks and anasarca Her most recent PET scan on 4/10/2024 reveals mixed response to therapy with interval resolution of the majority of the sites of uptake in mediastinal and hilar lymph nodes.  There is new or increasing activity in left supraclavicular nodes, internal mammary and epicardial nodes, left inguinal nodes, the left iliopsoas muscle.  Decreased size of metastatic celiac node within increasing FDG uptake.  Similar levels of activity in mesenteric and left pelvic sidewall nodes. She underwent bilateral nephrostomy tube placement due to ureteral obstruction due to fibrosis vs. Tumor.  Radiation Oncology is consulted regarding palliative radiation to the left inguinal / pelvic lymph nodes for pain control.    At present, she reports 7/10 pain in the left inguinal/lower back.  She was noted to have bilateral lower extremity edema and distended abdomen.  Nephrostomy tubes bilaterally draining serosanguineous fluid.         No new subjective & objective note has been filed under this hospital service since the last note was generated.    Assessment/Plan:     Malignant neoplasm of sigmoid colon  This is a 54 year old female with stage IV colon cancer with enlarging pelvic and  inguinal lymph nodes with pain.     She will benefit from palliative radiation to the left pelvis/ inguinal lymphadenopathy for pain control.  I plan to deliver 8 Gy x 1. I plan to obtain treatment planning CT while inpatient prior to treatment. Please call if questions. [phone]        Thank you for your consult. I will follow-up with patient. Please contact us if you have any additional questions.    Shruthi Vernon MD  Radiation Oncology  [facility] - Transplant Stepdown

## 2024-04-23 NOTE — PROGRESS NOTES
[facility] - Transplant Stepdown  Hematology/Oncology  Progress Note    Patient Name: Gail Mckinnon  Admission Date: 4/19/2024  Hospital Length of Stay: 3 days  Code Status: Full Code     Subjective:     HPI:  54F with sigmoid cancer (patient of Dr. Pimentel, has progressed through multiple therapies and recently on single agent Debo) with evidence of disease progression and active OP plans to start fruquintinib who presented to the ED with hip pain. Vitals stable, labs show MARILOU. CT shows tumor involvement at left hip illiopsoas which may be contributing to pain. Patient admitted for pain management and MARILOU.     Interval History: Pt tearful this morning, feeling understandably overwhelmed with new nephrostomy tubes and continued hip pain. Pain is improved at rest but continues to bother her with movement.    Oncology Treatment Plan:   [No matching plan found]    Medications:  Continuous Infusions:  Current Facility-Administered Medications   Medication Dose Route Frequency Last Rate Last Admin    sodium chloride 0.9%   Intravenous Continuous 100 mL/hr at 04/23/24 1350 New Bag at 04/23/24 1350     Scheduled Meds:  Current Facility-Administered Medications   Medication Dose Route Frequency    acetaminophen  1,000 mg Oral Q8H    aluminum-magnesium hydroxide-simethicone  30 mL Oral QID (AC & HS)    buPROPion  150 mg Oral Daily    cinacalcet  30 mg Oral Daily with breakfast    famotidine  20 mg Oral Daily    gabapentin  300 mg Oral QHS    heparin (porcine)  7,500 Units Subcutaneous Q8H    levothyroxine  350 mcg Oral Before breakfast    mupirocin   Nasal BID    oxyCODONE  20 mg Oral Q12H    polyethylene glycol  17 g Oral Daily    predniSONE  5 mg Oral Daily    senna-docusate 8.6-50 mg  1 tablet Oral BID    sucralfate  1 g Oral QID     PRN Meds:  Current Facility-Administered Medications:     calcium carbonate, 1,000 mg, Oral, TID PRN    dextrose 10%, 12.5 g, Intravenous, PRN    dextrose 10%, 25 g, Intravenous, PRN     dicyclomine, 20 mg, Oral, BID PRN    fentaNYL, 25 mcg, Intravenous, Q5 Min PRN    glucagon (human recombinant), 1 mg, Intramuscular, PRN    glucose, 16 g, Oral, PRN    glucose, 24 g, Oral, PRN    HYDROmorphone, 1 mg, Intravenous, Q4H PRN    lactulose, 10 g, Oral, Q6H PRN    LORazepam, 1 mg, Oral, Q12H PRN    mirtazapine, 7.5 mg, Oral, Nightly PRN    naloxone, 0.02 mg, Intravenous, PRN    ondansetron, 4 mg, Oral, Q8H PRN    ondansetron, 4 mg, Intravenous, Daily PRN    oxyCODONE, 15 mg, Oral, Q4H PRN    prochlorperazine, 10 mg, Oral, Q6H PRN    sodium chloride 0.9%, 10 mL, Intravenous, Q12H PRN     Review of Systems  Objective:     Vital Signs (Most Recent):  Temp: 97.7 °F (36.5 °C) (04/23/24 1117)  Pulse: 102 (04/23/24 1455)  Resp: 16 (04/23/24 1354)  BP: 116/78 (04/23/24 1117)  SpO2: 95 % (04/23/24 1117) Vital Signs (24h Range):  Temp:  [97.3 °F (36.3 °C)-98.5 °F (36.9 °C)] 97.7 °F (36.5 °C)  Pulse:  [] 102  Resp:  [8-25] 16  SpO2:  [91 %-99 %] 95 %  BP: (100-141)/(57-99) 116/78     Weight: 133.2 kg (293 lb 10.4 oz)  Body mass index is 47.4 kg/m².  Body surface area is 2.49 meters squared.      Intake/Output Summary (Last 24 hours) at 4/23/2024 1525  Last data filed at 4/23/2024 0911  Gross per 24 hour   Intake 300 ml   Output 1315 ml   Net -1015 ml        Physical Exam  Vitals reviewed.   Constitutional:       General: She is not in acute distress.     Appearance: She is not toxic-appearing or diaphoretic.   HENT:      Head: Normocephalic and atraumatic.      Right Ear: External ear normal.      Left Ear: External ear normal.      Nose: Nose normal.      Mouth/Throat:      Mouth: Mucous membranes are moist.   Eyes:      General: No scleral icterus.        Right eye: No discharge.         Left eye: No discharge.      Extraocular Movements: Extraocular movements intact.   Neck:      Comments: Trachea midline  Cardiovascular:      Rate and Rhythm: Normal rate.   Pulmonary:      Effort: Pulmonary effort is  normal. No respiratory distress.   Abdominal:      General: There is distension.      Palpations: Abdomen is soft.      Tenderness: There is no abdominal tenderness.      Comments: Bilateral nephrostomies in place dressing c/d/i   Musculoskeletal:         General: No deformity or signs of injury.      Cervical back: Normal range of motion.      Right lower leg: Edema present.      Left lower leg: Edema present.   Skin:     General: Skin is dry.      Coloration: Skin is not jaundiced.      Findings: No rash.   Neurological:      General: No focal deficit present.      Mental Status: She is alert and oriented to person, place, and time.      Cranial Nerves: No cranial nerve deficit.   Psychiatric:         Behavior: Behavior normal.         Judgment: Judgment normal.          Significant Labs:   All pertinent labs from the last 24 hours have been reviewed.    Diagnostic Results:  I have reviewed all pertinent imaging results/findings within the past 24 hours.  Assessment/Plan:     * Hip pain  - CT evidence of tumor involvement at left hip illiopsoas which may be contributing to pain. Pain improving with adjustments made to regimen per palliative.      Plan:  - Rad/Onc consulted for consideration of palliative radiation to L illiopsoas  - per palliative medicine,  - recommend increasing oxycodone to 15 mg q4h prn first line and continue hydromorphone 1 mg IVP as second line therapy  - Oxycontin 20 mg BID , may need up titration prior to discharge  - if okay with oncology team, consider starting low dose steroid such as prednisone 5 mg daily/bid for inflammatory pain given muscle involvement  - patient may also benefit from increase in gabapentin to 300 mg bid as kidney function allows (estimated Cr clearance today 45 so max safe dose is around 900 mg/day)    MARILOU (acute kidney injury)  Pt reports decreased urine output and decreased PO intake    Creatinine 2.8 on admit, baseline around 0.9 - 1.0  - Likely pre-renal from  dehydration and volume losses superimposed on post renal  - continuing to worsen, Urology unable to stent      Results:  US retroperitoneal: Moderate bilateral hydronephrosis, corresponding to findings on prior CT performed 03/11/2024.       Plan:   - S/p bilateral nephrostomy placement, monitor daily CMP, 1L IVF encourage PO Intake  - Strict I&Os and daily weights   - Avoid nephrotoxic agents such as NSAIDs, gadolinium and IV radiocontrast.  - Renally dose meds to current GFR.  - Maintain MAP > 65.        Hypothyroidism  - continue home synthroid     Other constipation  Senna BID    Anxiety  - home bupropion     Malignant neoplasm of sigmoid colon  Patient of Dr. Pimentel, has progressed through multiple therapies and recently on single agent Debo) with evidence of disease progression and active OP plans to start fruquintinib              Dimitry Carrasco MD  Hematology/Oncology  [facility] - [facility]

## 2024-04-23 NOTE — SUBJECTIVE & OBJECTIVE
Interval History: Pt tearful this morning, feeling understandably overwhelmed with new nephrostomy tubes and continued hip pain. Pain is improved at rest but continues to bother her with movement.    Oncology Treatment Plan:   [No matching plan found]    Medications:  Continuous Infusions:  Current Facility-Administered Medications   Medication Dose Route Frequency Last Rate Last Admin    sodium chloride 0.9%   Intravenous Continuous 100 mL/hr at 04/23/24 1350 New Bag at 04/23/24 1350     Scheduled Meds:  Current Facility-Administered Medications   Medication Dose Route Frequency    acetaminophen  1,000 mg Oral Q8H    aluminum-magnesium hydroxide-simethicone  30 mL Oral QID (AC & HS)    buPROPion  150 mg Oral Daily    cinacalcet  30 mg Oral Daily with breakfast    famotidine  20 mg Oral Daily    gabapentin  300 mg Oral QHS    heparin (porcine)  7,500 Units Subcutaneous Q8H    levothyroxine  350 mcg Oral Before breakfast    mupirocin   Nasal BID    oxyCODONE  20 mg Oral Q12H    polyethylene glycol  17 g Oral Daily    predniSONE  5 mg Oral Daily    senna-docusate 8.6-50 mg  1 tablet Oral BID    sucralfate  1 g Oral QID     PRN Meds:  Current Facility-Administered Medications:     calcium carbonate, 1,000 mg, Oral, TID PRN    dextrose 10%, 12.5 g, Intravenous, PRN    dextrose 10%, 25 g, Intravenous, PRN    dicyclomine, 20 mg, Oral, BID PRN    fentaNYL, 25 mcg, Intravenous, Q5 Min PRN    glucagon (human recombinant), 1 mg, Intramuscular, PRN    glucose, 16 g, Oral, PRN    glucose, 24 g, Oral, PRN    HYDROmorphone, 1 mg, Intravenous, Q4H PRN    lactulose, 10 g, Oral, Q6H PRN    LORazepam, 1 mg, Oral, Q12H PRN    mirtazapine, 7.5 mg, Oral, Nightly PRN    naloxone, 0.02 mg, Intravenous, PRN    ondansetron, 4 mg, Oral, Q8H PRN    ondansetron, 4 mg, Intravenous, Daily PRN    oxyCODONE, 15 mg, Oral, Q4H PRN    prochlorperazine, 10 mg, Oral, Q6H PRN    sodium chloride 0.9%, 10 mL, Intravenous, Q12H PRN     Review of  Systems  Objective:     Vital Signs (Most Recent):  Temp: 97.7 °F (36.5 °C) (04/23/24 1117)  Pulse: 102 (04/23/24 1455)  Resp: 16 (04/23/24 1354)  BP: 116/78 (04/23/24 1117)  SpO2: 95 % (04/23/24 1117) Vital Signs (24h Range):  Temp:  [97.3 °F (36.3 °C)-98.5 °F (36.9 °C)] 97.7 °F (36.5 °C)  Pulse:  [] 102  Resp:  [8-25] 16  SpO2:  [91 %-99 %] 95 %  BP: (100-141)/(57-99) 116/78     Weight: 133.2 kg (293 lb 10.4 oz)  Body mass index is 47.4 kg/m².  Body surface area is 2.49 meters squared.      Intake/Output Summary (Last 24 hours) at 4/23/2024 1525  Last data filed at 4/23/2024 0911  Gross per 24 hour   Intake 300 ml   Output 1315 ml   Net -1015 ml        Physical Exam  Vitals reviewed.   Constitutional:       General: She is not in acute distress.     Appearance: She is not toxic-appearing or diaphoretic.   HENT:      Head: Normocephalic and atraumatic.      Right Ear: External ear normal.      Left Ear: External ear normal.      Nose: Nose normal.      Mouth/Throat:      Mouth: Mucous membranes are moist.   Eyes:      General: No scleral icterus.        Right eye: No discharge.         Left eye: No discharge.      Extraocular Movements: Extraocular movements intact.   Neck:      Comments: Trachea midline  Cardiovascular:      Rate and Rhythm: Normal rate.   Pulmonary:      Effort: Pulmonary effort is normal. No respiratory distress.   Abdominal:      General: There is distension.      Palpations: Abdomen is soft.      Tenderness: There is no abdominal tenderness.      Comments: Bilateral nephrostomies in place dressing c/d/i   Musculoskeletal:         General: No deformity or signs of injury.      Cervical back: Normal range of motion.      Right lower leg: Edema present.      Left lower leg: Edema present.   Skin:     General: Skin is dry.      Coloration: Skin is not jaundiced.      Findings: No rash.   Neurological:      General: No focal deficit present.      Mental Status: She is alert and oriented to  person, place, and time.      Cranial Nerves: No cranial nerve deficit.   Psychiatric:         Behavior: Behavior normal.         Judgment: Judgment normal.          Significant Labs:   All pertinent labs from the last 24 hours have been reviewed.    Diagnostic Results:  I have reviewed all pertinent imaging results/findings within the past 24 hours.

## 2024-04-24 NOTE — CHAPLAIN
"Palliative Care     Patient: Gail Mckinnon  MRN: [id number]  : 1970  Age: 54 y.o.  Hospital Length of Stay: 4  Code Status: Code Status Discussion Note  Attending Provider: Virgie Rice MD  Principal Problem: Hip pain    Non-clinical observations of patient/room: Visited pall med pt per request of pall med provider. Pt's mother, Maryanne, bedside as well; pt awake, alert, communicative and pain well managed. 20 min visit.    Pt and mother welcomed me into the room, introduced myself and referenced Dr. Vicente and the palliative care team, quick overview of pall med and supportive care. Provided compassionate presence and listening ear as mom shares the beginning of the hospitalization. Mom said they were told if she hadn't have brought pt to [facility] when she did that her kidneys would be failing worse and she'd be on dialysis. Both pt and mother agreed they were grateful for that.    Pt shared with me about the cruise scheduled with her best friends-- she's now accepted that it's not possible (they were to depart tomorrow). They all agreed to reschedule for when pt's draining tubes are out and she's stronger. Her friends paid for pt's ticket and they also bought the insurance. We discussed how rescheduling is best so that she's not rushed to discharge and then has a set back on the ship.     When asked, pt said that God/her miah is what gives her strength through all of this. She is [employer] ([employer]). However, pt confesses that she's getting tired of one bad news report after another. I observed pt seemed "down" and discouraged. Mom was way more optimistic and hopeful. I reminded them to celebrate any seemingly small baby step forward. Also shared that slow progress, albeit it painful, is usually more sustainable than an abrupt improvement that can't be maintained. They appreciated the perspective.     Pt had a birthday earlier this month and her friends threw her a surprise party. Pt is well " loved and she has good support system. She is , x- . Has three grown children, one lives with her and another lives with her mother.     Pt welcomed me to pray with/for/over her and the three of us held hands. Gave them my card and said I'd visit again and they welcomed it. Lord, in your mercy.     **Spiritual Care Dept. Chaplains are available evenings, overnight and weekends **    In Peace,  Rev. Lisa Green MDiv, [facility]  Board Certified   Palliative Medicine Department  [phone]

## 2024-04-24 NOTE — PT/OT/SLP EVAL
Occupational Therapy   Evaluation    Name: Gail Mckinnon  MRN: [id number]  Admitting Diagnosis: Hip pain  Recent Surgery: Procedure(s) (LRB):  CYSTOSCOPY (N/A) 2 Days Post-Op    Recommendations:     Discharge Recommendations: Low Intensity Therapy  Discharge Equipment Recommendations:  walker, rolling, shower chair  Barriers to discharge:  None    Assessment:     Gail Mckinnon is a 54 y.o. female with a medical diagnosis of Hip pain.  She presents with (L) hip pain x 1 month with imaging showing tumor involvement at left hip iliopsoas. Pt was able to walk a decent amount without c/o increased pain. Performance deficits affecting function: impaired endurance, impaired self care skills, decreased lower extremity function, impaired functional mobility, gait instability, impaired balance, pain, decreased coordination.      Rehab Prognosis: Good; patient would benefit from acute skilled OT services to address these deficits and reach maximum level of function.       Plan:     Patient to be seen 3 x/week to address the above listed problems via self-care/home management, therapeutic activities, therapeutic exercises  Plan of Care Expires: 05/24/24  Plan of Care Reviewed with: patient, mother    Subjective     Occupational Profile:  Living Environment: Pt lives with her adult daughter in a condo with 2 small steps and a T/S setup.  Previous level of function: Independent until 1 month ago due to L hip pain. Now, needs help with LBD.  Equipment Used at Home: none  Assistance upon Discharge: mom    Pain/Comfort:  Pain Rating 1: 10/10  Location - Side 1: Left  Location 1: hip    Patients cultural, spiritual, Holiness conflicts given the current situation:      Objective:     Communicated with: rn prior to session.  Patient found supine with nephrostomy upon OT entry to room.    General Precautions: Standard, fall  Orthopedic Precautions:    Braces:    Respiratory Status: Room air    Occupational  Performance:    Bed Mobility:    Patient completed Rolling/Turning to Right with minimum assistance  Patient completed Supine to Sit with moderate assistance  Patient completed Sit to Supine with stand by assistance    Functional Mobility/Transfers:  Patient completed Sit <> Stand Transfer with contact guard assistance  with  rolling walker   Functional Mobility: Pt walked to the bathroom and into the hallway with SBA using a RW.    Activities of Daily Living:  Grooming: stand by assistance standing at the sink.    Cognitive/Visual Perceptual:  Cognitive/Psychosocial Skills:     -       Oriented to: Person, Place, Time, and Situation   -       Safety awareness/insight to disability: intact     Physical Exam:  BUE AROM/MMT: WNL  Sitting / standing balance = Good / SBA.    AMPAC 6 Click ADL:  AMPAC Total Score: 20    Treatment & Education:  UE ROM/MMT  Bed mobility training / assessment  Functional mobility assessment  Sit/standing balance assessment  Educated on importance of sitting OOB in bedside chair to promote increased strength, endurance & breathing.  Discussed OT POC / Post-acute plan    Patient left supine with all lines intact and call button in reach    GOALS:   Multidisciplinary Problems       Occupational Therapy Goals          Problem: Occupational Therapy    Goal Priority Disciplines Outcome Interventions   Occupational Therapy Goal     OT, PT/OT Progressing    Description: Goals to be met by: 5/1/24    Patient will increase functional independence with ADLs by performing:    LE Dressing with Supervision and Assistive Devices as needed.  Grooming while standing at sink with Modified [place].  Toileting from toilet with [place] for hygiene and clothing management.   Supine to sit with Modified [place].  Toilet transfer to toilet with Modified [place].                         History:     Past Medical History:   Diagnosis Date    Anxiety     Depression     FH: ovarian cancer  2020     of psychiatric care     Effexor, Paxil, Lexapro, Zoloft, Wellbutrin, Trazodone Buspar    Hypertension     Hyperthyroidism     Hypothyroid     Kidney calculi     Malignant neoplasm of sigmoid colon 2020    Menorrhagia     Multinodular goiter 2012    Palpitation     Psychiatric problem     Venous insufficiency     Vulvar lesion          Past Surgical History:   Procedure Laterality Date     SECTION, CLASSIC      x3    COLON SURGERY      COLONOSCOPY N/A 2020    Procedure: COLONOSCOPY;  Surgeon: Shane Parker MD;  Location: [facility];  Service: Endoscopy;  Laterality: N/A;    COLONOSCOPY N/A 2022    Procedure: COLONOSCOPY;  Surgeon: Shane Parker MD;  Location: [facility];  Service: Endoscopy;  Laterality: N/A;    COLONOSCOPY N/A 2023    Procedure: COLONOSCOPY;  Surgeon: Shane Parker MD;  Location: [facility];  Service: Endoscopy;  Laterality: N/A;  no cecum anastomosis    CYSTOSCOPY N/A 2024    Procedure: CYSTOSCOPY;  Surgeon: Willie Bailey Jr., MD;  Location: [facility] OR 1ST FLR;  Service: Urology;  Laterality: N/A;    CYSTOSCOPY W/ URETERAL STENT PLACEMENT Bilateral 2020    Procedure: CYSTOSCOPY, WITH URETERAL STENT INSERTION;  Surgeon: Claudio Tyson MD;  Location: [facility] OR 2ND FLR;  Service: Urology;  Laterality: Bilateral;    ESOPHAGOGASTRODUODENOSCOPY N/A 2024    Procedure: EGD (ESOPHAGOGASTRODUODENOSCOPY);  Surgeon: Shane Parker MD;  Location: [facility];  Service: Gastroenterology;  Laterality: N/A;    EXAMINATION UNDER ANESTHESIA N/A 2024    Procedure: Exam under anesthesia-vagina;  Surgeon: Eli Her MD;  Location: [facility] OR;  Service: OB/GYN;  Laterality: N/A;    EXCISION-WIDE LOCAL Left 2024    Procedure: EXCISION-WIDE LOCAL -  Labia Majora;  Surgeon: Eli Her MD;  Location: [facility] OR;  Service: OB/GYN;  Laterality: Left;  upper left side of labia majora    EXCISIONAL BIOPSY, LYMPH NODE   07/2022    abdominal x 4    INSERTION OF TUNNELED CENTRAL VENOUS CATHETER (CVC) WITH SUBCUTANEOUS PORT N/A 05/01/2020    Procedure: [id number];  Surgeon: Stephen Diagnostic Provider;  [facility];  Service: Radiology;  Laterality: N/A;  Room 189/[facility]    LAPAROSCOPIC SIGMOIDECTOMY N/A 03/25/2020    Procedure: COLECTOMY, SIGMOID, LAPAROSCOPIC, flex sig, ERAS high;  Surgeon: Silvio Man MD;  Location: [facility] OR [facility] FLR;  Service: Colon and Rectal;  Laterality: N/A;    MOBILIZATION OF SPLENIC FLEXURE  03/25/2020    Procedure: MOBILIZATION, SPLENIC FLEXURE;  Surgeon: Silvio Man MD;  [facility];  Service: Colon and Rectal;;    TONSILLECTOMY      TOTAL ABDOMINAL HYSTERECTOMY W/ BILATERAL SALPINGOOPHORECTOMY N/A 03/25/2020    Procedure: HYSTERECTOMY, TOTAL, ABDOMINAL, WITH BILATERAL SALPINGO-OOPHORECTOMY;  Surgeon: Keron Brady MD;  [facility];  Service: Oncology;  Laterality: N/A;       Time Tracking:     OT Date of Treatment: 04/24/24  OT Start Time: 1317  OT Stop Time: 1336  OT Total Time (min): 19 min    Billable Minutes:Evaluation 8  Therapeutic Activity 11    4/24/2024

## 2024-04-24 NOTE — ASSESSMENT & PLAN NOTE
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 Pain improving with adjustments made to regimen per palliative.      Plan:  - Rad/Onc consulted for consideration of palliative radiation to L elissaiopsoas  - per palliative medicine,  - recommend increasing oxycodone to 15 mg q4h prn first line and continue hydromorphone 1 mg IVP as second line therapy  - increase Oxycontin 40 mg BID  - if okay with oncology team, consider starting low dose steroid such as prednisone 5 mg daily/bid for inflammatory pain given muscle involvement  - patient may also benefit from increase in gabapentin to 300 mg bid as kidney function allows (estimated Cr clearance today 45 so max safe dose is around 900 mg/day)

## 2024-04-24 NOTE — SUBJECTIVE & OBJECTIVE
Interval History: No acute events overnight, afebrile, hemodynamically stable. Creatinine improved. Pt to get palliative radiation today, PT/OT consulted. Pt requiring frequent IV pain medications, appreciate palliative medicine assistance.    Oncology Treatment Plan:   [No matching plan found]    Medications:  Continuous Infusions:  Current Facility-Administered Medications   Medication Dose Route Frequency Last Rate Last Admin     Scheduled Meds:  Current Facility-Administered Medications   Medication Dose Route Frequency    acetaminophen  1,000 mg Oral Q8H    aluminum-magnesium hydroxide-simethicone  30 mL Oral QID (AC & HS)    buPROPion  150 mg Oral Daily    cinacalcet  30 mg Oral Daily with breakfast    famotidine  20 mg Oral Daily    gabapentin  300 mg Oral QHS    heparin (porcine)  7,500 Units Subcutaneous Q8H    levothyroxine  350 mcg Oral Before breakfast    mupirocin   Nasal BID    oxyCODONE  40 mg Oral Q12H    polyethylene glycol  17 g Oral Daily    predniSONE  5 mg Oral Daily    senna-docusate 8.6-50 mg  1 tablet Oral BID    sucralfate  1 g Oral QID (AC & HS)     PRN Meds:  Current Facility-Administered Medications:     calcium carbonate, 1,000 mg, Oral, TID PRN    dextrose 10%, 12.5 g, Intravenous, PRN    dextrose 10%, 25 g, Intravenous, PRN    dicyclomine, 20 mg, Oral, BID PRN    fentaNYL, 25 mcg, Intravenous, Q5 Min PRN    glucagon (human recombinant), 1 mg, Intramuscular, PRN    glucose, 16 g, Oral, PRN    glucose, 24 g, Oral, PRN    HYDROmorphone, 1 mg, Intravenous, Q4H PRN    lactulose, 10 g, Oral, Q6H PRN    LORazepam, 1 mg, Oral, Q12H PRN    mirtazapine, 7.5 mg, Oral, Nightly PRN    naloxone, 0.02 mg, Intravenous, PRN    ondansetron, 4 mg, Oral, Q8H PRN    ondansetron, 4 mg, Intravenous, Daily PRN    oxyCODONE, 15 mg, Oral, Q4H PRN    prochlorperazine, 10 mg, Oral, Q6H PRN    sodium chloride 0.9%, 10 mL, Intravenous, Q12H PRN     Review of Systems  Objective:     Vital Signs (Most Recent):  Temp:  97.5 °F (36.4 °C) (04/24/24 1119)  Pulse: 103 (04/24/24 1119)  Resp: 16 (04/24/24 1421)  BP: 108/71 (04/24/24 1119)  SpO2: 96 % (04/24/24 1119) Vital Signs (24h Range):  Temp:  [97.5 °F (36.4 °C)-98.3 °F (36.8 °C)] 97.5 °F (36.4 °C)  Pulse:  [] 103  Resp:  [14-18] 16  SpO2:  [94 %-97 %] 96 %  BP: ()/(54-73) 108/71     Weight: 135 kg (297 lb 9.9 oz)  Body mass index is 48.04 kg/m².  Body surface area is 2.51 meters squared.      Intake/Output Summary (Last 24 hours) at 4/24/2024 1532  Last data filed at 4/24/2024 0609  Gross per 24 hour   Intake 1417.07 ml   Output 795 ml   Net 622.07 ml        Physical Exam  Vitals reviewed.   Constitutional:       General: She is not in acute distress.     Appearance: She is not toxic-appearing or diaphoretic.   HENT:      Head: Normocephalic and atraumatic.      Right Ear: External ear normal.      Left Ear: External ear normal.      Nose: Nose normal.      Mouth/Throat:      Mouth: Mucous membranes are moist.   Eyes:      General: No scleral icterus.        Right eye: No discharge.         Left eye: No discharge.      Extraocular Movements: Extraocular movements intact.   Neck:      Comments: Trachea midline  Cardiovascular:      Rate and Rhythm: Normal rate.   Pulmonary:      Effort: Pulmonary effort is normal. No respiratory distress.   Abdominal:      General: There is distension.      Palpations: Abdomen is soft.      Tenderness: There is no abdominal tenderness.      Comments: Bilateral nephrostomies in place dressing c/d/i   Musculoskeletal:         General: No deformity or signs of injury.      Cervical back: Normal range of motion.      Right lower leg: Edema present.      Left lower leg: Edema present.   Skin:     General: Skin is dry.      Coloration: Skin is not jaundiced.      Findings: No rash.   Neurological:      General: No focal deficit present.      Mental Status: She is alert and oriented to person, place, and time.      Cranial Nerves: No cranial  nerve deficit.   Psychiatric:         Behavior: Behavior normal.         Judgment: Judgment normal.          Significant Labs:   All pertinent labs from the last 24 hours have been reviewed.    Diagnostic Results:  I have reviewed all pertinent imaging results/findings within the past 24 hours.

## 2024-04-24 NOTE — ASSESSMENT & PLAN NOTE
54yoF with worsening MARILOU in the setting of bilateral hydroureteronephrosis. Currently unclear etiology of hydronephrosis, may be component of potential right ureteral stone vs factor from prior abdominopelvic surgical history or cancer history.      - s/p bilateral nephrostomy tube placement with IR    - Maintain nephrostomy tubes    - Please make sure nephrostomy tubes are not kinked    - Creatinine 3.9 from a baseline of 0.6-1.0. Will follow up morning labs. Trending creatinine.    - Recommend Nephrology consult as creatinine is not down trending   - Diet: Regular    - Strict I/O's   - Rest of care per primary

## 2024-04-24 NOTE — ASSESSMENT & PLAN NOTE
Ms. 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 Of note, patient's recent imaging showed evidence of progression. Outpatient plan to start fruquintinib. In ED patient noted to have MARILOU. Imaging showed tumor involvement at left hip iliopsoas. S/p attempt for bilateral ureteral stents that was unsuccessful, followed by IR-guided bilateral nephrostomy tube placements for hydronephrosis on 4/22/24. Patient admitted for pain management and MARILOU. Palliative Medicine consulted for symptom management.    Sigmoid cancer/MARILOU/bilateral hydronephrosis/HTN/thyroid problem/other medical problems  - plan per primary team and other speciality consultants (Urology)    GOC/ACP  - patient decisional and by herself in room  - no ACP documents uploaded into EMR  - patient follows with Palliative Medicine NP Claudio and Dr. Hill on [facility] already; next appt: 04/23/24   - On discharge, I will contact outpatient palliative medicine team on [place] to arrange new follow up appointment  - NOK: patient's children  - On 04/20/2024 patient verbally stated she would want her middle son, Star Mckinnon, to be decision maker at [phone]  - will follow up at future visits to fill out ACP forms  - goals: improvement in symptoms to be able to go on cruise on Thursday 4/25/24 with friends; also continue life prolonging measures  - code status not discussed    Cancer related pain  - patient reporting severe pain in left hip; she also has pain at times in abdomen/groin and acute tenderness from new bilateral nephrostomy tube placements  - 24 hour OME: 125   - outpatient regimen: oxycodone-apap  mg q4h prn, gabapentin 300 mg qhs, and flexeril 5 mg q8h prn. This was not providing any relief.  - Increase Oxycontin 20 mg to 40 mg PO BID  - Continue oxycodone 15 mg PO q4h PRN severe pain   - May benefit from  offer-may-refuse schedule  - Continue hydromorphone 1 mg IV q4h PRN breakthrough pain  - Rad/onc engaged, performed simulation on 4/23  - patient may also benefit from increase in gabapentin to 300 mg bid as kidney function allows    Nausea/Anorexia  - patient with increased nausea due to increased pain  - patient with poor appetite constantly  - would benefit from nutrition consult while in the hospital  - continue zofran 4 mg q8h prn and prochlorperazine 10 mg q6h prn    Constipation  - ongoing issue  - uses senna at home with some relief  - continue senna and miralax as ordered    Anxiety/depression/spiritual distress  - patient feeling very overwhelmed with news of progression  - on 4/21 patient spoke of possible urologic surgery (stents vs nephrostomy tubes). Patient feeling very overwhelmed by news of additional issues. Patient upset with ongoing bad news as well as possibility of not being able to make trip with her friends. Patient spoke about how she feels that this is her last trip she will be able to make, and she wants to be able to enjoy herself and the time she has left.   - she reports not feeling at peace spiritually  - 4/23: Emotional support provided in context of her malignancy and the barriers it has created in living her life (like attending cruise with friends this Thursday 4/25/24) and her understandable fears related to perception from others related to opioids for appropriate treatment of neoplasm-related pain.  - continue Wellbutrin, lorazepam, and mirtazapine  - recommend scheduling mirtazapine 7.5 mg qhs

## 2024-04-24 NOTE — PROGRESS NOTES
[facility] - Transplant Stepdown  Urology  Progress Note    Patient Name: Gail Mckinnon  MRN: [id number]  Admission Date: 4/19/2024  Hospital Length of Stay: 4 days  Code Status: Full Code   Attending Provider: Virgie Rice MD   Primary Care Physician: Marah Santo MD    Subjective:     HPI:  54yoF with sigmoid cancer (multiple prior treatments with surgery and chemotherapy, has had lap colectomy, MARCIAL/BSO, lysis of adhesions) with evidence of disease progression and active OP plans to start fruquintinib who presented to the ED with hip pain. Vitals stable, labs show MARILOU. CT shows  tumor involvement at left hip illiopsoas which may be contributing to pain. Patient admitted for pain management and MARILOU.     On chart review patient Cr baseline likely 0.6- 1.0, Cr in 03/2024 was 1.0, and on labs since has been rising with an acute rise over the past few days up to 3.5 today. Of note patient had unremarkable CT in 01/27/2024 with no hydronephrosis and mild nephrolithiasis. No hydronephrosis on US 2/13/24. On subsequent CT on 3/11/24 patient did have mild bilateral hydroureteronephrosis without obstructing calculi. On personal review of PET from 4/10/24 there appears to be increasing hydronephrosis and potentially a small calcification in the right ureter which may represent a ureteral calculus. Patient also had significant hydronephrosis on US yesterday.     Patient seen today, vitals stable. PVR 0. UA non-concerning for infection but contaminated. Patient denies any associated symptoms, no n/v/f/c/flank pain/hematuria/dysuria. Patient endorses being told she has a history of urolithiasis but has never had any intervention for it. Denies any history of prior abdominal or pelvic radiation. Urology consulted for MARILOU.     Interval History:  No acute events overnight. Hemodynamically stable. Tolerating a regular diet. The patient reports adequate fluid intake.  Denies flank pain/abdominal pain.     Review of  Systems   Constitutional:  Negative for chills and fever.   Respiratory: Negative.     Cardiovascular: Negative.    Gastrointestinal: Negative.    Skin: Negative.      Objective:     Temp:  [97.5 °F (36.4 °C)-98.3 °F (36.8 °C)] 98.3 °F (36.8 °C)  Pulse:  [] 101  Resp:  [8-18] 18  SpO2:  [94 %-97 %] 94 %  BP: (101-126)/(57-78) 112/62     Body mass index is 48.04 kg/m².           Drains       Drain  Duration                  Nephrostomy 04/22/24 1546 Right 10 Fr. 1 day         Nephrostomy 04/22/24 1547 Left 10 Fr. 1 day                     Physical Exam  Vitals and nursing note reviewed.   Constitutional:       Appearance: Normal appearance.   HENT:      Head: Normocephalic and atraumatic.   Eyes:      Extraocular Movements: Extraocular movements intact.      Conjunctiva/sclera: Conjunctivae normal.      Pupils: Pupils are equal, round, and reactive to light.   Cardiovascular:      Rate and Rhythm: Normal rate.   Pulmonary:      Effort: Pulmonary effort is normal.   Abdominal:      General: Abdomen is flat.      Palpations: Abdomen is soft.      Comments: Bilateral Nephrostomy tubes in place, both with great output and flushed again during morning rounds  Right Nephrostomy tube draining blood tinged urine   Left Nephrostomy tube draining blood tinged urine      Skin:     General: Skin is warm.      Capillary Refill: Capillary refill takes less than 2 seconds.   Neurological:      General: No focal deficit present.      Mental Status: She is alert and oriented to person, place, and time.   Psychiatric:         Mood and Affect: Mood normal.           Significant Labs:    BMP:  Recent Labs   Lab 04/21/24  0547 04/22/24  0606 04/23/24  0621   * 134* 136   K 4.3 4.4 4.6    110 111*   CO2 13* 12* 14*   BUN 36* 41* 46*   CREATININE 3.5* 3.8* 3.9*   CALCIUM 9.5 9.5 9.8       CBC:   Recent Labs   Lab 04/21/24  0547 04/22/24  0606 04/23/24  0621   WBC 5.54 7.22 6.91   HGB 11.6* 12.7 11.6*   HCT 37.9 40.8 37.4  "   398 354       Blood Culture: No results for input(s): "LABBLOO" in the last 168 hours.  Urine Culture:   Recent Labs   Lab 04/20/24  1550 04/21/24  1800   LABURIN Multiple organisms isolated. None in predominance.  Repeat if  clinically necessary. Multiple organisms isolated. None in predominance.  Repeat if  clinically necessary.     Urine Studies:   Recent Labs   Lab 04/20/24  1550 04/21/24  1800   COLORU Yellow Yellow   APPEARANCEUA Hazy* Hazy*   PHUR 6.0 5.0   SPECGRAV 1.020 1.020   PROTEINUA 1+* 1+*   GLUCUA Negative Negative   KETONESU Negative Negative   BILIRUBINUA Negative Negative   OCCULTUA Negative Negative   NITRITE Negative Negative   LEUKOCYTESUR 2+* 3+*   RBCUA 3 2   WBCUA 40* 45*   BACTERIA Occasional Few*   SQUAMEPITHEL 19 14   HYALINECASTS 0 0       Significant Imaging:  All pertinent imaging results/findings from the past 24 hours have been reviewed.                  Assessment/Plan:     MARILOU (acute kidney injury)  54yoF with worsening MARILOU in the setting of bilateral hydroureteronephrosis. Currently unclear etiology of hydronephrosis, may be component of potential right ureteral stone vs factor from prior abdominopelvic surgical history or cancer history.      - s/p bilateral nephrostomy tube placement with IR    - Maintain nephrostomy tubes    - Please make sure nephrostomy tubes are not kinked    - Creatinine 3.9 from a baseline of 0.6-1.0. Will follow up morning labs. Trending creatinine.    - Recommend Nephrology consult as creatinine is not down trending   - Diet: Regular    - Strict I/O's   - Rest of care per primary           VTE Risk Mitigation (From admission, onward)           Ordered     heparin (porcine) injection 7,500 Units  Every 8 hours         04/20/24 0838     IP VTE HIGH RISK PATIENT  Once         04/20/24 0838     Place sequential compression device  Until discontinued         04/20/24 2637                    Mona Nguyễn MD  Urology  [facility] - Transplant " Stepdown

## 2024-04-24 NOTE — SUBJECTIVE & OBJECTIVE
Interval History:  No acute events overnight. Hemodynamically stable. Tolerating a regular diet. The patient reports adequate fluid intake.  Denies flank pain/abdominal pain.     Review of Systems   Constitutional:  Negative for chills and fever.   Respiratory: Negative.     Cardiovascular: Negative.    Gastrointestinal: Negative.    Skin: Negative.      Objective:     Temp:  [97.5 °F (36.4 °C)-98.3 °F (36.8 °C)] 98.3 °F (36.8 °C)  Pulse:  [] 101  Resp:  [8-18] 18  SpO2:  [94 %-97 %] 94 %  BP: (101-126)/(57-78) 112/62     Body mass index is 48.04 kg/m².           Drains       Drain  Duration                  Nephrostomy 04/22/24 1546 Right 10 Fr. 1 day         Nephrostomy 04/22/24 1547 Left 10 Fr. 1 day                     Physical Exam  Vitals and nursing note reviewed.   Constitutional:       Appearance: Normal appearance.   HENT:      Head: Normocephalic and atraumatic.   Eyes:      Extraocular Movements: Extraocular movements intact.      Conjunctiva/sclera: Conjunctivae normal.      Pupils: Pupils are equal, round, and reactive to light.   Cardiovascular:      Rate and Rhythm: Normal rate.   Pulmonary:      Effort: Pulmonary effort is normal.   Abdominal:      General: Abdomen is flat.      Palpations: Abdomen is soft.      Comments: Bilateral Nephrostomy tubes in place, both with great output and flushed again during morning rounds  Right Nephrostomy tube draining blood tinged urine   Left Nephrostomy tube draining blood tinged urine      Skin:     General: Skin is warm.      Capillary Refill: Capillary refill takes less than 2 seconds.   Neurological:      General: No focal deficit present.      Mental Status: She is alert and oriented to person, place, and time.   Psychiatric:         Mood and Affect: Mood normal.           Significant Labs:    BMP:  Recent Labs   Lab 04/21/24  0547 04/22/24  0606 04/23/24  0621   * 134* 136   K 4.3 4.4 4.6    110 111*   CO2 13* 12* 14*   BUN 36* 41* 46*  "  CREATININE 3.5* 3.8* 3.9*   CALCIUM 9.5 9.5 9.8       CBC:   Recent Labs   Lab 04/21/24  0547 04/22/24  0606 04/23/24  0621   WBC 5.54 7.22 6.91   HGB 11.6* 12.7 11.6*   HCT 37.9 40.8 37.4    398 354       Blood Culture: No results for input(s): "LABBLOO" in the last 168 hours.  Urine Culture:   Recent Labs   Lab 04/20/24  1550 04/21/24  1800   LABURIN Multiple organisms isolated. None in predominance.  Repeat if  clinically necessary. Multiple organisms isolated. None in predominance.  Repeat if  clinically necessary.     Urine Studies:   Recent Labs   Lab 04/20/24  1550 04/21/24  1800   COLORU Yellow Yellow   APPEARANCEUA Hazy* Hazy*   PHUR 6.0 5.0   SPECGRAV 1.020 1.020   PROTEINUA 1+* 1+*   GLUCUA Negative Negative   KETONESU Negative Negative   BILIRUBINUA Negative Negative   OCCULTUA Negative Negative   NITRITE Negative Negative   LEUKOCYTESUR 2+* 3+*   RBCUA 3 2   WBCUA 40* 45*   BACTERIA Occasional Few*   SQUAMEPITHEL 19 14   HYALINECASTS 0 0       Significant Imaging:  All pertinent imaging results/findings from the past 24 hours have been reviewed.                "

## 2024-04-24 NOTE — PROGRESS NOTES
[facility] - [facility]  Palliative Medicine  Progress Note    Patient Name: Gail Mckinnon  MRN: [id number]  Admission Date: 4/19/2024  Hospital Length of Stay: 4 days  Code Status: Full Code   Attending Provider: Virgie Rice MD  Consulting Provider: Raquel Vicente MD  Primary Care Physician: Marah Santo MD  Principal Problem:Hip pain    Patient information was obtained from patient and primary team.      Assessment/Plan:     Palliative Care  Palliative care encounter  Ms. Mckinnno is a miguelina 53 y/o F with HTN, depression/anxiety, thyroid problem, and sigmoid carcinoma (s/p multiple lines of treatment) presented with worsening left hip pain. Of note, patient's recent imaging showed evidence of progression. Outpatient plan to start fruquintinib. In ED patient noted to have MARILOU. Imaging showed tumor involvement at left hip iliopsoas. S/p attempt for bilateral ureteral stents that was unsuccessful, followed by IR-guided bilateral nephrostomy tube placements for hydronephrosis on 4/22/24. Patient admitted for pain management and MARILOU. Palliative Medicine consulted for symptom management.    Sigmoid cancer/MARILOU/bilateral hydronephrosis/HTN/thyroid problem/other medical problems  - plan per primary team and other speciality consultants (Urology)    GOC/ACP  - patient decisional and by herself in room  - no ACP documents uploaded into EMR  - patient follows with Palliative Medicine NP Claudio and Dr. Hill on [facility] already; next appt: 04/23/24   - On discharge, I will contact outpatient palliative medicine team on [place] to arrange new follow up appointment  - NOK: patient's children  - On 04/20/2024 patient verbally stated she would want her middle son, Star Mckinnon, to be decision maker at [phone]  - will follow up at future visits to fill out ACP forms  - goals: improvement in symptoms to be able to go on cruise on Thursday 4/25/24 with friends; also continue life prolonging  measures  - code status not discussed    Cancer related pain  - patient reporting severe pain in left hip; she also has pain at times in abdomen/groin and acute tenderness from new bilateral nephrostomy tube placements  - 24 hour OME: 125   - outpatient regimen: oxycodone-apap  mg q4h prn, gabapentin 300 mg qhs, and flexeril 5 mg q8h prn. This was not providing any relief.  - Increase Oxycontin 20 mg to 40 mg PO BID  - Continue oxycodone 15 mg PO q4h PRN severe pain   - May benefit from offer-may-refuse schedule  - Continue hydromorphone 1 mg IV q4h PRN breakthrough pain  - Rad/onc engaged, performed simulation on 4/23  - patient may also benefit from increase in gabapentin to 300 mg bid as kidney function allows    Nausea/Anorexia  - patient with increased nausea due to increased pain  - patient with poor appetite constantly  - would benefit from nutrition consult while in the hospital  - continue zofran 4 mg q8h prn and prochlorperazine 10 mg q6h prn    Constipation  - ongoing issue  - uses senna at home with some relief  - continue senna and miralax as ordered    Anxiety/depression/spiritual distress  - patient feeling very overwhelmed with news of progression  - on 4/21 patient spoke of possible urologic surgery (stents vs nephrostomy tubes). Patient feeling very overwhelmed by news of additional issues. Patient upset with ongoing bad news as well as possibility of not being able to make trip with her friends. Patient spoke about how she feels that this is her last trip she will be able to make, and she wants to be able to enjoy herself and the time she has left.   - she reports not feeling at peace spiritually  - 4/23: Emotional support provided in context of her malignancy and the barriers it has created in living her life (like attending cruise with friends this Thursday 4/25/24) and her understandable fears related to perception from others related to opioids for appropriate treatment of  neoplasm-related pain.  - continue Wellbutrin, lorazepam, and mirtazapine  - recommend scheduling mirtazapine 7.5 mg qhs        I will follow-up with patient. Please contact us if you have any additional questions.    Subjective:     Chief Complaint:   Chief Complaint   Patient presents with    Leg Pain     Left upper thigh pain and swelling. Difficulty ambulating. Denies trauma. On chemo for metastatic colon cancer.         HPI:   Ms. Mckinnon is a 53 y/o F with HTN, depression/anxiety, thyroid problem, and sigmoid carcinoma (s/p multiple lines of treatment) presented with worsening left hip pain. Of note, patient's recent imaging showed evidence of progression. Outpatient plan to start fruquintinib. In ED patient noted to have MARILOU. Imaging showed tumor involvement at left hip iliopsoas. Patient admitted for pain management and MARILOU. Palliative Medicine consulted for symptom management.    Hospital Course:  No notes on file    Interval History: Pleasant, in bed. Did admit that pain is not adequately controlled. Agreeable with adjustment in pain medicine.    Prior 24 hour MME: 272.5  Oxycontin 20 mg BID x2 = 60 MME  Oxycodone 15 mg PO x5 = 112.5 MME  Hydromorphone 1 mg IV x5 = 100 MME    Medications:  Continuous Infusions:  Current Facility-Administered Medications   Medication Dose Route Frequency Last Rate Last Admin     Scheduled Meds:  Current Facility-Administered Medications   Medication Dose Route Frequency    acetaminophen  1,000 mg Oral Q8H    aluminum-magnesium hydroxide-simethicone  30 mL Oral QID (AC & HS)    buPROPion  150 mg Oral Daily    cinacalcet  30 mg Oral Daily with breakfast    famotidine  20 mg Oral Daily    gabapentin  300 mg Oral QHS    heparin (porcine)  7,500 Units Subcutaneous Q8H    levothyroxine  350 mcg Oral Before breakfast    mupirocin   Nasal BID    oxyCODONE  30 mg Oral Q12H    polyethylene glycol  17 g Oral Daily    predniSONE  5 mg Oral Daily    senna-docusate 8.6-50 mg  1 tablet  Oral BID    sucralfate  1 g Oral QID (AC & HS)     PRN Meds:  Current Facility-Administered Medications:     calcium carbonate, 1,000 mg, Oral, TID PRN    dextrose 10%, 12.5 g, Intravenous, PRN    dextrose 10%, 25 g, Intravenous, PRN    dicyclomine, 20 mg, Oral, BID PRN    fentaNYL, 25 mcg, Intravenous, Q5 Min PRN    glucagon (human recombinant), 1 mg, Intramuscular, PRN    glucose, 16 g, Oral, PRN    glucose, 24 g, Oral, PRN    HYDROmorphone, 1 mg, Intravenous, Q4H PRN    lactulose, 10 g, Oral, Q6H PRN    LORazepam, 1 mg, Oral, Q12H PRN    mirtazapine, 7.5 mg, Oral, Nightly PRN    naloxone, 0.02 mg, Intravenous, PRN    ondansetron, 4 mg, Oral, Q8H PRN    ondansetron, 4 mg, Intravenous, Daily PRN    oxyCODONE, 15 mg, Oral, Q4H PRN    prochlorperazine, 10 mg, Oral, Q6H PRN    sodium chloride 0.9%, 10 mL, Intravenous, Q12H PRN    Objective:     Vital Signs (Most Recent):  Temp: 97.5 °F (36.4 °C) (04/24/24 0807)  Pulse: 104 (04/24/24 0807)  Resp: 18 (04/24/24 0807)  BP: (!) 89/54 (04/24/24 0807)  SpO2: 96 % (04/24/24 0807) Vital Signs (24h Range):  Temp:  [97.5 °F (36.4 °C)-98.3 °F (36.8 °C)] 97.5 °F (36.4 °C)  Pulse:  [] 104  Resp:  [8-18] 18  SpO2:  [94 %-97 %] 96 %  BP: ()/(54-78) 89/54     Weight: 135 kg (297 lb 9.9 oz)  Body mass index is 48.04 kg/m².       Physical Exam  Vitals reviewed.   Constitutional:       General: She is not in acute distress.     Appearance: She is not toxic-appearing or diaphoretic.   HENT:      Head: Normocephalic and atraumatic.      Right Ear: External ear normal.      Left Ear: External ear normal.      Nose: Nose normal.      Mouth/Throat:      Mouth: Mucous membranes are moist.   Eyes:      General: No scleral icterus.        Right eye: No discharge.         Left eye: No discharge.      Extraocular Movements: Extraocular movements intact.   Neck:      Comments: Trachea midline  Cardiovascular:      Rate and Rhythm: Normal rate.   Pulmonary:      Effort: Pulmonary effort  is normal. No respiratory distress.   Abdominal:      Palpations: Abdomen is soft.   Musculoskeletal:         General: No deformity or signs of injury.      Cervical back: Normal range of motion.   Skin:     General: Skin is dry.      Coloration: Skin is not jaundiced.      Findings: No rash.   Neurological:      General: No focal deficit present.      Mental Status: She is alert and oriented to person, place, and time.      Cranial Nerves: No cranial nerve deficit.   Psychiatric:         Mood and Affect: Mood is anxious and depressed. Affect is tearful.         Behavior: Behavior normal.         Judgment: Judgment normal.            Review of Symptoms      Symptom Assessment (ESAS 0-10 Scale)  Pain:  8  Dyspnea:  0  Anxiety:  8  Nausea:  0  Depression:  8  Anorexia:  5  Fatigue:  6  Insomnia:  0  Restlessness:  0  Agitation:  0     CAM / Delirium:  Negative  Constipation:  Positive  Diarrhea:  Negative      Bowel Management Plan (BMP):  Yes      Pain Assessment:  OME in 24 hours:  125  Location(s): leg    Leg       Location: left        Quality: Aching, burning, sharp and pressure-like        Quantity: 8/10 in intensity        Chronicity: Onset 1 (greater than) week(s) ago, unchanged        Aggravating Factors: Activity and walking        Alleviating Factors: Opiates        Associated Symptoms: None    Modified Ramona Scale:  0    ECOG Performance Status stGstrstastdstest:st st1st Living Arrangements:  Lives with family and Lives in home    Psychosocial/Cultural:   See Palliative Psychosocial Note: No  Social Issues Identified: Coping deficit pt/family and Mental Health  Bereavement Risk: No  Caregiver Needs Discussed. Caregiver Distress: No: n/a  Cultural: patient enjoys shopping; she has three children (2 sons and 1 daughter). Has good support with her mother and close friends.   **Primary  to Follow**  Palliative Care  Consult: No    Spiritual:  F - Marilyn and Belief:  Yes  I - Importance:  Yes  C -  "Community:  Has   A - Address in Care:  In distress        Advance Care Planning  Advance Directives:   Living Will: No    LaPOST: No    Do Not Resuscitate Status: No    Medical Power of : No        Oral Declaration: Yes  Agent's Name:  Star Mckinnon   Agent's Contact Number:  [phone]    Decision Making:  Patient answered questions  Goals of Care: What is most important right now is to focus on remaining as independent as possible, symptom/pain control, extending life as long as possible, even it it means sacrificing quality. Accordingly, we have decided that the best plan to meet the patient's goals includes continuing with treatment.         Significant Labs: All pertinent labs within the past 24 hours have been reviewed.  CBC:   Recent Labs   Lab 04/24/24 0544   WBC 6.08   HGB 11.0*   HCT 35.6*   MCV 94        BMP:  Recent Labs   Lab 04/24/24 0544   GLU 86   *   K 4.3      CO2 12*   BUN 48*   CREATININE 3.6*   CALCIUM 8.8   MG 2.1     LFT:  Lab Results   Component Value Date    AST 13 04/24/2024    ALKPHOS 110 04/24/2024    BILITOT 0.2 04/24/2024     Albumin:   Albumin   Date Value Ref Range Status   04/24/2024 2.3 (L) 3.5 - 5.2 g/dL Final     Protein:   Total Protein   Date Value Ref Range Status   04/24/2024 5.8 (L) 6.0 - 8.4 g/dL Final     Lactic acid:   No results found for: "LACTATE"    Significant Imaging: I have reviewed all pertinent imaging results/findings within the past 24 hours.    04/20/2024 US retroperitoneal: "Moderate bilateral hydronephrosis, corresponding to findings on prior CT performed 03/11/2024. At least 1 nonobstructing right renal calculus is noted.  Additional smaller no calculi seen on prior CT are not well characterized. Nonspecific increased attenuation dependently within the urinary bladder.  No definite internal vascularity on single provided image.  Findings are nonspecific and could relate to debris and/or hemorrhage.  Soft tissue mass " "would be difficult to exclude on limited images.  Cross-sectional imaging could be considered for further characterization."     I spent a total of 50 minutes on the day of the visit. This includes face to face time in discussion of goals of care, symptom assessment, coordination of care and emotional support. This also includes non-face to face time preparing to see the patient (eg, review of tests/imaging), obtaining and/or reviewing separately obtained history, documenting clinical information in the electronic or other health record, independently interpreting results and communicating results to the patient/family/caregiver, or care coordinator.     Raquel Vicente MD  Palliative Medicine  [facility] - Transplant Stepdown                "

## 2024-04-24 NOTE — PROGRESS NOTES
[facility] - Transplant [facility]down  Hematology/Oncology  Progress Note    Patient Name: Gail Mckinnon  Admission Date: 4/19/2024  Hospital Length of Stay: 4 days  Code Status: Full Code     Subjective:     HPI:  54F with sigmoid cancer (patient of Dr. Pimentel, has progressed through multiple therapies and recently on single agent Debo) with evidence of disease progression and active OP plans to start fruquintinib who presented to the ED with hip pain. Vitals stable, labs show MARILOU. CT shows tumor involvement at left hip illiopsoas which may be contributing to pain. Patient admitted for pain management and MARILOU.     Interval History: No acute events overnight, afebrile, hemodynamically stable. Creatinine improved. Pt to get palliative radiation today, PT/OT consulted. Pt requiring frequent IV pain medications, appreciate palliative medicine assistance.    Oncology Treatment Plan:   [No matching plan found]    Medications:  Continuous Infusions:  Current Facility-Administered Medications   Medication Dose Route Frequency Last Rate Last Admin     Scheduled Meds:  Current Facility-Administered Medications   Medication Dose Route Frequency    acetaminophen  1,000 mg Oral Q8H    aluminum-magnesium hydroxide-simethicone  30 mL Oral QID (AC & HS)    buPROPion  150 mg Oral Daily    cinacalcet  30 mg Oral Daily with breakfast    famotidine  20 mg Oral Daily    gabapentin  300 mg Oral QHS    heparin (porcine)  7,500 Units Subcutaneous Q8H    levothyroxine  350 mcg Oral Before breakfast    mupirocin   Nasal BID    oxyCODONE  40 mg Oral Q12H    polyethylene glycol  17 g Oral Daily    predniSONE  5 mg Oral Daily    senna-docusate 8.6-50 mg  1 tablet Oral BID    sucralfate  1 g Oral QID (AC & HS)     PRN Meds:  Current Facility-Administered Medications:     calcium carbonate, 1,000 mg, Oral, TID PRN    dextrose 10%, 12.5 g, Intravenous, PRN    dextrose 10%, 25 g, Intravenous, PRN    dicyclomine, 20 mg, Oral, BID PRN    fentaNYL, 25  mcg, Intravenous, Q5 Min PRN    glucagon (human recombinant), 1 mg, Intramuscular, PRN    glucose, 16 g, Oral, PRN    glucose, 24 g, Oral, PRN    HYDROmorphone, 1 mg, Intravenous, Q4H PRN    lactulose, 10 g, Oral, Q6H PRN    LORazepam, 1 mg, Oral, Q12H PRN    mirtazapine, 7.5 mg, Oral, Nightly PRN    naloxone, 0.02 mg, Intravenous, PRN    ondansetron, 4 mg, Oral, Q8H PRN    ondansetron, 4 mg, Intravenous, Daily PRN    oxyCODONE, 15 mg, Oral, Q4H PRN    prochlorperazine, 10 mg, Oral, Q6H PRN    sodium chloride 0.9%, 10 mL, Intravenous, Q12H PRN     Review of Systems  Objective:     Vital Signs (Most Recent):  Temp: 97.5 °F (36.4 °C) (04/24/24 1119)  Pulse: 103 (04/24/24 1119)  Resp: 16 (04/24/24 1421)  BP: 108/71 (04/24/24 1119)  SpO2: 96 % (04/24/24 1119) Vital Signs (24h Range):  Temp:  [97.5 °F (36.4 °C)-98.3 °F (36.8 °C)] 97.5 °F (36.4 °C)  Pulse:  [] 103  Resp:  [14-18] 16  SpO2:  [94 %-97 %] 96 %  BP: ()/(54-73) 108/71     Weight: 135 kg (297 lb 9.9 oz)  Body mass index is 48.04 kg/m².  Body surface area is 2.51 meters squared.      Intake/Output Summary (Last 24 hours) at 4/24/2024 1532  Last data filed at 4/24/2024 0609  Gross per 24 hour   Intake 1417.07 ml   Output 795 ml   Net 622.07 ml        Physical Exam  Vitals reviewed.   Constitutional:       General: She is not in acute distress.     Appearance: She is not toxic-appearing or diaphoretic.   HENT:      Head: Normocephalic and atraumatic.      Right Ear: External ear normal.      Left Ear: External ear normal.      Nose: Nose normal.      Mouth/Throat:      Mouth: Mucous membranes are moist.   Eyes:      General: No scleral icterus.        Right eye: No discharge.         Left eye: No discharge.      Extraocular Movements: Extraocular movements intact.   Neck:      Comments: Trachea midline  Cardiovascular:      Rate and Rhythm: Normal rate.   Pulmonary:      Effort: Pulmonary effort is normal. No respiratory distress.   Abdominal:       General: There is distension.      Palpations: Abdomen is soft.      Tenderness: There is no abdominal tenderness.      Comments: Bilateral nephrostomies in place dressing c/d/i   Musculoskeletal:         General: No deformity or signs of injury.      Cervical back: Normal range of motion.      Right lower leg: Edema present.      Left lower leg: Edema present.   Skin:     General: Skin is dry.      Coloration: Skin is not jaundiced.      Findings: No rash.   Neurological:      General: No focal deficit present.      Mental Status: She is alert and oriented to person, place, and time.      Cranial Nerves: No cranial nerve deficit.   Psychiatric:         Behavior: Behavior normal.         Judgment: Judgment normal.          Significant Labs:   All pertinent labs from the last 24 hours have been reviewed.    Diagnostic Results:  I have reviewed all pertinent imaging results/findings within the past 24 hours.  Assessment/Plan:     * Hip pain  - CT evidence of tumor involvement at left hip illiopsoas which may be contributing to pain. Pain improving with adjustments made to regimen per palliative.      Plan:  - Rad/Onc consulted for consideration of palliative radiation to L illiopsoas  - per palliative medicine,  - recommend increasing oxycodone to 15 mg q4h prn first line and continue hydromorphone 1 mg IVP as second line therapy  - increase Oxycontin 40 mg BID  - if okay with oncology team, consider starting low dose steroid such as prednisone 5 mg daily/bid for inflammatory pain given muscle involvement  - patient may also benefit from increase in gabapentin to 300 mg bid as kidney function allows (estimated Cr clearance today 45 so max safe dose is around 900 mg/day)    MARILOU (acute kidney injury)  Pt reports decreased urine output and decreased PO intake    Creatinine 2.8 on admit, baseline around 0.9 - 1.0  - Likely pre-renal from dehydration and volume losses superimposed on post renal  - continuing to worsen,  Urology unable to stent      Results:  US retroperitoneal: Moderate bilateral hydronephrosis, corresponding to findings on prior CT performed 03/11/2024.       Plan:   - S/p bilateral nephrostomy placement, monitor daily CMP, 1L IVF encourage PO Intake  - Strict I&Os and daily weights   - Avoid nephrotoxic agents such as NSAIDs, gadolinium and IV radiocontrast.  - Renally dose meds to current GFR.  - Maintain MAP > 65.        Hypothyroidism  - continue home synthroid     Other constipation  Senna BID    Anxiety  - home bupropion     Malignant neoplasm of sigmoid colon  Patient of Dr. Pimentel, has progressed through multiple therapies and recently on single agent Debo) with evidence of disease progression and active OP plans to start fruquintinib              Dimitry Carrasco MD  Hematology/Oncology  [facility] - Transplant [facility]down

## 2024-04-24 NOTE — ANESTHESIA POSTPROCEDURE EVALUATION
Anesthesia Post Evaluation    Patient: Gail Mckinnon    Procedure(s) Performed: Procedure(s) (LRB):  CYSTOSCOPY (N/A)    Final Anesthesia Type: general      Patient location during evaluation: PACU  Patient participation: Yes- Able to Participate  Level of consciousness: awake and alert  Post-procedure vital signs: reviewed and stable  Pain management: adequate  Airway patency: patent    PONV status at discharge: No PONV  Anesthetic complications: no      Cardiovascular status: blood pressure returned to baseline  Respiratory status: unassisted  Hydration status: euvolemic  Follow-up not needed.              Vitals Value Taken Time   /60 04/22/24 1047   Temp 36.5 °C (97.7 °F) 04/22/24 1005   Pulse 98 04/22/24 1057   Resp 17 04/22/24 1057   SpO2 95 % 04/22/24 1057   Vitals shown include unfiled device data.      No case tracking events are documented in the log.      Pain/Aggie Score: Pain Rating Prior to Med Admin: 7 (4/24/2024  5:25 AM)  Pain Rating Post Med Admin: 4 (4/24/2024  6:08 AM)

## 2024-04-24 NOTE — SUBJECTIVE & OBJECTIVE
Interval History: Pleasant, in bed. Did admit that pain is not adequately controlled. Agreeable with adjustment in pain medicine.    Prior 24 hour MME: 272.5  Oxycontin 20 mg BID x2 = 60 MME  Oxycodone 15 mg PO x5 = 112.5 MME  Hydromorphone 1 mg IV x5 = 100 MME    Medications:  Continuous Infusions:  Current Facility-Administered Medications   Medication Dose Route Frequency Last Rate Last Admin     Scheduled Meds:  Current Facility-Administered Medications   Medication Dose Route Frequency    acetaminophen  1,000 mg Oral Q8H    aluminum-magnesium hydroxide-simethicone  30 mL Oral QID (AC & HS)    buPROPion  150 mg Oral Daily    cinacalcet  30 mg Oral Daily with breakfast    famotidine  20 mg Oral Daily    gabapentin  300 mg Oral QHS    heparin (porcine)  7,500 Units Subcutaneous Q8H    levothyroxine  350 mcg Oral Before breakfast    mupirocin   Nasal BID    oxyCODONE  30 mg Oral Q12H    polyethylene glycol  17 g Oral Daily    predniSONE  5 mg Oral Daily    senna-docusate 8.6-50 mg  1 tablet Oral BID    sucralfate  1 g Oral QID (AC & HS)     PRN Meds:  Current Facility-Administered Medications:     calcium carbonate, 1,000 mg, Oral, TID PRN    dextrose 10%, 12.5 g, Intravenous, PRN    dextrose 10%, 25 g, Intravenous, PRN    dicyclomine, 20 mg, Oral, BID PRN    fentaNYL, 25 mcg, Intravenous, Q5 Min PRN    glucagon (human recombinant), 1 mg, Intramuscular, PRN    glucose, 16 g, Oral, PRN    glucose, 24 g, Oral, PRN    HYDROmorphone, 1 mg, Intravenous, Q4H PRN    lactulose, 10 g, Oral, Q6H PRN    LORazepam, 1 mg, Oral, Q12H PRN    mirtazapine, 7.5 mg, Oral, Nightly PRN    naloxone, 0.02 mg, Intravenous, PRN    ondansetron, 4 mg, Oral, Q8H PRN    ondansetron, 4 mg, Intravenous, Daily PRN    oxyCODONE, 15 mg, Oral, Q4H PRN    prochlorperazine, 10 mg, Oral, Q6H PRN    sodium chloride 0.9%, 10 mL, Intravenous, Q12H PRN    Objective:     Vital Signs (Most Recent):  Temp: 97.5 °F (36.4 °C) (04/24/24 0807)  Pulse: 104  (04/24/24 0807)  Resp: 18 (04/24/24 0807)  BP: (!) 89/54 (04/24/24 0807)  SpO2: 96 % (04/24/24 0807) Vital Signs (24h Range):  Temp:  [97.5 °F (36.4 °C)-98.3 °F (36.8 °C)] 97.5 °F (36.4 °C)  Pulse:  [] 104  Resp:  [8-18] 18  SpO2:  [94 %-97 %] 96 %  BP: ()/(54-78) 89/54     Weight: 135 kg (297 lb 9.9 oz)  Body mass index is 48.04 kg/m².       Physical Exam  Vitals reviewed.   Constitutional:       General: She is not in acute distress.     Appearance: She is not toxic-appearing or diaphoretic.   HENT:      Head: Normocephalic and atraumatic.      Right Ear: External ear normal.      Left Ear: External ear normal.      Nose: Nose normal.      Mouth/Throat:      Mouth: Mucous membranes are moist.   Eyes:      General: No scleral icterus.        Right eye: No discharge.         Left eye: No discharge.      Extraocular Movements: Extraocular movements intact.   Neck:      Comments: Trachea midline  Cardiovascular:      Rate and Rhythm: Normal rate.   Pulmonary:      Effort: Pulmonary effort is normal. No respiratory distress.   Abdominal:      Palpations: Abdomen is soft.   Musculoskeletal:         General: No deformity or signs of injury.      Cervical back: Normal range of motion.   Skin:     General: Skin is dry.      Coloration: Skin is not jaundiced.      Findings: No rash.   Neurological:      General: No focal deficit present.      Mental Status: She is alert and oriented to person, place, and time.      Cranial Nerves: No cranial nerve deficit.   Psychiatric:         Mood and Affect: Mood is anxious and depressed. Affect is tearful.         Behavior: Behavior normal.         Judgment: Judgment normal.            Review of Symptoms      Symptom Assessment (ESAS 0-10 Scale)  Pain:  8  Dyspnea:  0  Anxiety:  8  Nausea:  0  Depression:  8  Anorexia:  5  Fatigue:  6  Insomnia:  0  Restlessness:  0  Agitation:  0     CAM / Delirium:  Negative  Constipation:  Positive  Diarrhea:  Negative      Bowel  Management Plan (BMP):  Yes      Pain Assessment:  OME in 24 hours:  125  Location(s): leg    Leg       Location: left        Quality: Aching, burning, sharp and pressure-like        Quantity: 8/10 in intensity        Chronicity: Onset 1 (greater than) week(s) ago, unchanged        Aggravating Factors: Activity and walking        Alleviating Factors: Opiates        Associated Symptoms: None    Modified Ramona Scale:  0    ECOG Performance Status rdGrdrrdarddrderd:rd rd3rd Living Arrangements:  Lives with family and Lives in home    Psychosocial/Cultural:   See Palliative Psychosocial Note: No  Social Issues Identified: Coping deficit pt/family and Mental Health  Bereavement Risk: No  Caregiver Needs Discussed. Caregiver Distress: No: n/a  Cultural: patient enjoys shopping; she has three children (2 sons and 1 daughter). Has good support with her mother and close friends.   **Primary  to Follow**  Palliative Care  Consult: No    Spiritual:  F - Marilyn and Belief:  Yes  I - Importance:  Yes  C - Community:  Has   A - Address in Care:  In distress        Advance Care Planning   Advance Directives:   Living Will: No    LaPOST: No    Do Not Resuscitate Status: No    Medical Power of : No        Oral Declaration: Yes  Agent's Name:  Star Mckinnon   Agent's Contact Number:  [phone]    Decision Making:  Patient answered questions  Goals of Care: What is most important right now is to focus on remaining as independent as possible, symptom/pain control, extending life as long as possible, even it it means sacrificing quality. Accordingly, we have decided that the best plan to meet the patient's goals includes continuing with treatment.         Significant Labs: All pertinent labs within the past 24 hours have been reviewed.  CBC:   Recent Labs   Lab 04/24/24  0544   WBC 6.08   HGB 11.0*   HCT 35.6*   MCV 94        BMP:  Recent Labs   Lab 04/24/24 0544   GLU 86   *   K 4.3     "  CO2 12*   BUN 48*   CREATININE 3.6*   CALCIUM 8.8   MG 2.1     LFT:  Lab Results   Component Value Date    AST 13 04/24/2024    ALKPHOS 110 04/24/2024    BILITOT 0.2 04/24/2024     Albumin:   Albumin   Date Value Ref Range Status   04/24/2024 2.3 (L) 3.5 - 5.2 g/dL Final     Protein:   Total Protein   Date Value Ref Range Status   04/24/2024 5.8 (L) 6.0 - 8.4 g/dL Final     Lactic acid:   No results found for: "LACTATE"    Significant Imaging: I have reviewed all pertinent imaging results/findings within the past 24 hours.    04/20/2024 US retroperitoneal: "Moderate bilateral hydronephrosis, corresponding to findings on prior CT performed 03/11/2024. At least 1 nonobstructing right renal calculus is noted.  Additional smaller no calculi seen on prior CT are not well characterized. Nonspecific increased attenuation dependently within the urinary bladder.  No definite internal vascularity on single provided image.  Findings are nonspecific and could relate to debris and/or hemorrhage.  Soft tissue mass would be difficult to exclude on limited images.  Cross-sectional imaging could be considered for further characterization."   "

## 2024-04-24 NOTE — PLAN OF CARE
Pt AAO.HX of  sigmoid carcinoma (s/p multiple lines of treatment) presented with worsening left hip pain.and MARILOU -  recent imaging showed evidence of progression. Bilateral neph tubes placed for hydronephrosis draining red urine ( right) and pipnk urine ( left ). Palliative following for pain management - scheduled ms contin given as well as oxy po15mg Q4 and 1mg IV dilaudid given ATC. VSS. Pt up to bathroom r-equested walker as seems to have more difficult walking due to pain.  Creat 3.9 ( 3.8).  Allowed pt time to talk about upcoming cruise she would like to go on with her friends and voice concerns.  Radiation oncolofy cx for mapping complete on Tuesday- awaiting further plans.   Encouraged po intake - drinking water for most of the night.  Skin intact. Pt has non skid socks on and turns in bed.  Call bell in hand and calls for assistance for mobility.

## 2024-04-24 NOTE — PLAN OF CARE
Problem: Occupational Therapy  Goal: Occupational Therapy Goal  Description: Goals to be met by: 5/1/24    Patient will increase functional independence with ADLs by performing:    LE Dressing with Supervision and Assistive Devices as needed.  Grooming while standing at sink with Modified Foster.  Toileting from toilet with Foster for hygiene and clothing management.   Supine to sit with Modified Foster.  Toilet transfer to toilet with Modified Foster.    Outcome: Progressing

## 2024-04-25 NOTE — PROGRESS NOTES
[facility]  Urology Progress Note  04/25/2024    Patient chart checked this am. Cr continues to slowly downtrend 3.4 from 3.6 from 3.9. Hgb stable. Adequate urine output from bilateral nephrostomy tubes. Vital signs stable. Urine culture from both 4/20/24 and 4/21/24 with multiple orgs.    - Agree with palliative care consult  - No diet or radiation restrictions from urologic perspective  - maintain bilateral nephrostomy tubes upon discharge  - Trend Cr, avoid nephrotoxic agents, avoid hypotension, and dose medications to patient's current GFR  - Does not need antibiotics from urologic perspective    - Consider nephrology consult if Cr does not return to baseline of 1.0  - No further urologic intervention anticipated this admission  - Recommend that primary treatment team ensures that bilateral nephrostomy tubes are exchanged every 3 months by IR outpatient  - Rest of care per primary    Urology will sign off at this time. Please reach out with any further questions or concerns.    Percy Velez MD  [facility] - PGY3

## 2024-04-25 NOTE — ASSESSMENT & PLAN NOTE
Ms. 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 Of note, patient's recent imaging showed evidence of progression. Outpatient plan to start fruquintinib. In ED patient noted to have MARILOU. Imaging showed tumor involvement at left hip iliopsoas. S/p attempt for bilateral ureteral stents that was unsuccessful, followed by IR-guided bilateral nephrostomy tube placements for hydronephrosis on 4/22/24. Patient admitted for pain management and MARILOU. Palliative Medicine consulted for symptom management.    Sigmoid cancer/MARILOU/bilateral hydronephrosis/HTN/thyroid problem/other medical problems  - plan per primary team and other speciality consultants (Urology)    GOC/ACP  - patient decisional and by herself in room  - no ACP documents uploaded into EMR  - patient follows with Palliative Medicine NP Claudio and Dr. Hill on [facility] already; next appt: 04/23/24   - On discharge, I will contact outpatient palliative medicine team on [place] to arrange new follow up appointment  - NOK: patient's children  - On 04/20/2024 patient verbally stated she would want her middle son, Star Mckinnon, to be decision maker at [phone]  - will follow up at future visits to fill out ACP forms  - goals: improvement in symptoms to be able to go on cruise on Thursday 4/25/24 with friends; also continue life prolonging measures  - code status not discussed    Cancer related pain  - patient reporting severe pain in left hip; she also has pain at times in abdomen/groin and acute tenderness from new bilateral nephrostomy tube placements  - 24 hour OME: 230  - outpatient regimen: oxycodone-apap  mg q4h prn, gabapentin 300 mg qhs, and flexeril 5 mg q8h prn. This was not providing any relief.  - Continue Oxycontin 40 mg PO BID  - Increase oxycodone 15 mg to 20 mg PO q4h PRN severe pain   - May benefit from  offer-may-refuse schedule  - Continue hydromorphone 1 mg IV q4h PRN breakthrough pain  - Rad/onc engaged, performed simulation on 4/23  - Continue gabapentin 300 mg PO qHS (unable to increase due to creatinine clearance)    Nausea/Anorexia  - patient with increased nausea due to increased pain  - patient with poor appetite constantly  - would benefit from nutrition consult while in the hospital  - continue zofran 4 mg q8h prn and prochlorperazine 10 mg q6h prn    Constipation  - ongoing issue  - uses senna at home with some relief  - continue senna and miralax as ordered    Anxiety/depression/spiritual distress  - patient feeling very overwhelmed with news of progression  - on 4/21 patient spoke of possible urologic surgery (stents vs nephrostomy tubes). Patient feeling very overwhelmed by news of additional issues. Patient upset with ongoing bad news as well as possibility of not being able to make trip with her friends. Patient spoke about how she feels that this is her last trip she will be able to make, and she wants to be able to enjoy herself and the time she has left.   - she reports not feeling at peace spiritually  - 4/23: Emotional support provided in context of her malignancy and the barriers it has created in living her life (like attending cruise with friends this Thursday 4/25/24) and her understandable fears related to perception from others related to opioids for appropriate treatment of neoplasm-related pain.  - continue Wellbutrin, lorazepam, and mirtazapine  - recommend scheduling mirtazapine 7.5 mg qhs

## 2024-04-25 NOTE — PROGRESS NOTES
[facility] Trans[facility] Stepdown  Hematology/Oncology  Progress Note    Patient Name: Gail Mckinnon  Admission Date: 4/19/2024  Hospital Length of Stay: 5 days  Code Status: Full Code     Subjective:     HPI:  54F with sigmoid cancer (patient of Dr. Pimentel, has progressed through multiple therapies and recently on single agent Debo) with evidence of disease progression and active OP plans to start fruquintinib who presented to the ED with hip pain. Vitals stable, labs show MARILOU. CT shows tumor involvement at left hip illiopsoas which may be contributing to pain. Patient admitted for pain management and MARILOU.     Interval History: No acute events overnight, afebrile, hemodynamically stable. Creatinine improved. Received palliative radiation yesterday, PT/OT consulted. Palliative medicine uptitrating pt medications. Still in considerable pain.    Oncology Treatment Plan:   [No matching plan found]    Medications:  Continuous Infusions:  Current Facility-Administered Medications   Medication Dose Route Frequency Last Rate Last Admin     Scheduled Meds:  Current Facility-Administered Medications   Medication Dose Route Frequency    acetaminophen  1,000 mg Oral Q8H    aluminum-magnesium hydroxide-simethicone  30 mL Oral QID (AC & HS)    buPROPion  150 mg Oral Daily    cinacalcet  30 mg Oral Daily with breakfast    famotidine  20 mg Oral Daily    gabapentin  300 mg Oral QHS    heparin (porcine)  7,500 Units Subcutaneous Q8H    levothyroxine  350 mcg Oral Before breakfast    mupirocin   Nasal BID    oxyCODONE  40 mg Oral Q12H    polyethylene glycol  17 g Oral TID    predniSONE  5 mg Oral Daily    senna-docusate 8.6-50 mg  2 tablet Oral BID    sucralfate  1 g Oral QID (AC & HS)     PRN Meds:  Current Facility-Administered Medications:     calcium carbonate, 1,000 mg, Oral, TID PRN    dextrose 10%, 12.5 g, Intravenous, PRN    dextrose 10%, 25 g, Intravenous, PRN    dicyclomine, 20 mg, Oral, BID PRN    fentaNYL, 25 mcg,  Intravenous, Q5 Min PRN    glucagon (human recombinant), 1 mg, Intramuscular, PRN    glucose, 16 g, Oral, PRN    glucose, 24 g, Oral, PRN    HYDROmorphone, 1 mg, Intravenous, Q4H PRN    lactulose, 10 g, Oral, Q6H PRN    LORazepam, 1 mg, Oral, Q12H PRN    mirtazapine, 7.5 mg, Oral, Nightly PRN    naloxone, 0.02 mg, Intravenous, PRN    ondansetron, 4 mg, Oral, Q8H PRN    ondansetron, 4 mg, Intravenous, Daily PRN    oxyCODONE, 20 mg, Oral, Q4H PRN    prochlorperazine, 10 mg, Oral, Q6H PRN    sodium chloride 0.9%, 10 mL, Intravenous, Q12H PRN     Review of Systems  Objective:     Vital Signs (Most Recent):  Temp: 98.4 °F (36.9 °C) (04/25/24 1142)  Pulse: (!) 112 (04/25/24 1142)  Resp: 16 (04/25/24 1142)  BP: 125/70 (04/25/24 1142)  SpO2: (!) 93 % (04/25/24 1142) Vital Signs (24h Range):  Temp:  [97.7 °F (36.5 °C)-98.4 °F (36.9 °C)] 98.4 °F (36.9 °C)  Pulse:  [103-112] 112  Resp:  [16-18] 16  SpO2:  [92 %-98 %] 93 %  BP: ()/(62-73) 125/70     Weight: 135 kg (297 lb 9.9 oz)  Body mass index is 48.04 kg/m².  Body surface area is 2.51 meters squared.      Intake/Output Summary (Last 24 hours) at 4/25/2024 1430  Last data filed at 4/25/2024 0617  Gross per 24 hour   Intake 750 ml   Output 500 ml   Net 250 ml        Physical Exam  Vitals reviewed.   Constitutional:       General: She is not in acute distress.     Appearance: She is not toxic-appearing or diaphoretic.   HENT:      Head: Normocephalic and atraumatic.      Right Ear: External ear normal.      Left Ear: External ear normal.      Nose: Nose normal.      Mouth/Throat:      Mouth: Mucous membranes are moist.   Eyes:      General: No scleral icterus.        Right eye: No discharge.         Left eye: No discharge.      Extraocular Movements: Extraocular movements intact.   Neck:      Comments: Trachea midline  Cardiovascular:      Rate and Rhythm: Normal rate.   Pulmonary:      Effort: Pulmonary effort is normal. No respiratory distress.   Abdominal:       General: There is distension.      Palpations: Abdomen is soft.      Tenderness: There is no abdominal tenderness.      Comments: Bilateral nephrostomies in place dressing c/d/i   Musculoskeletal:         General: No deformity or signs of injury.      Cervical back: Normal range of motion.      Right lower leg: Edema present.      Left lower leg: Edema present.   Skin:     General: Skin is dry.      Coloration: Skin is not jaundiced.      Findings: No rash.   Neurological:      General: No focal deficit present.      Mental Status: She is alert and oriented to person, place, and time.      Cranial Nerves: No cranial nerve deficit.   Psychiatric:         Behavior: Behavior normal.         Judgment: Judgment normal.          Significant Labs:   All pertinent labs from the last 24 hours have been reviewed.    Diagnostic Results:  I have reviewed and interpreted all pertinent imaging results/findings within the past 24 hours.  Assessment/Plan:     * Hip pain  - CT evidence of tumor involvement at left hip illiopsoas which may be contributing to pain. Pain improving with adjustments made to regimen per palliative.  - S/p palliative radiation to L iliopsoas      Plan:  - palliative medicine following  - increasing oxycodone to 20 mg q4h prn first line and continue hydromorphone 1 mg IVP as second line therapy  - Continue Oxycontin 40 mg BID  - prednisone 5 mg daily for inflammatory pain given muscle involvement  - gabapentin to 300 mg Daily    MARILOU (acute kidney injury)  Pt reports decreased urine output and decreased PO intake    Creatinine 2.8 on admit, baseline around 0.9 - 1.0  - Likely pre-renal from dehydration and volume losses superimposed on post renal  - continuing to worsen, Urology unable to stent      Results:  US retroperitoneal: Moderate bilateral hydronephrosis, corresponding to findings on prior CT performed 03/11/2024.       Plan:   - S/p bilateral nephrostomy placement, monitor daily CMP, 1L IVF  encourage PO Intake  - Strict I&Os and daily weights   - Avoid nephrotoxic agents such as NSAIDs, gadolinium and IV radiocontrast.  - Renally dose meds to current GFR.  - Maintain MAP > 65.        Hypothyroidism  - continue home synthroid     Other constipation  Miralax TID  Senna BID    Anxiety  - home bupropion     Malignant neoplasm of sigmoid colon  Patient of Dr. Pimentel, has progressed through multiple therapies and recently on single agent Debo) with evidence of disease progression and active OP plans to start fruquintinib              Dimitry Carrasco MD  Hematology/Oncology  [facility] - [facility] Step[facility]

## 2024-04-25 NOTE — PT/OT/SLP PROGRESS
Physical Therapy      Patient Name:  Gail Mckinnon   MRN:  [id number]    Patient not seen today secondary to Other (Comment) (patient had just returned to bed, reporting significant nausea, politely declining therapy). Attempted PT eval at 11:30am, unable to return for second attempt. Will follow-up as appropriate.

## 2024-04-25 NOTE — PROGRESS NOTES
[facility] - Transplant Stepdown  Palliative Medicine  Progress Note    Patient Name: Gail Mckinnon  MRN: [id number]  Admission Date: 4/19/2024  Hospital Length of Stay: 5 days  Code Status: Full Code   Attending Provider: Virgie Rice MD  Consulting Provider: Raquel Vicente MD  Primary Care Physician: Marah Santo MD  Principal Problem:Hip pain    Patient information was obtained from patient and primary team.      Assessment/Plan:     Palliative Care  Palliative care encounter  Ms. 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 Of note, patient's recent imaging showed evidence of progression. Outpatient plan to start fruquintinib. In ED patient noted to have MARILOU. Imaging showed tumor involvement at left hip iliopsoas. S/p attempt for bilateral ureteral stents that was unsuccessful, followed by IR-guided bilateral nephrostomy tube placements for hydronephrosis on 4/22/24. Patient admitted for pain management and MARILOU. Palliative Medicine consulted for symptom management.    Sigmoid cancer/MARILOU/bilateral hydronephrosis/HTN/thyroid problem/other medical problems  - plan per primary team and other speciality consultants (Urology)    GOC/ACP  - patient decisional and by herself in room  - no ACP documents uploaded into EMR  - patient follows with Palliative Medicine NP Claudio and Dr. Hill on [facility] already; next appt: 04/23/24   - On discharge, I will contact outpatient palliative medicine team on [facility] [place] to arrange new follow up appointment  - NOK: patient's children  - On 04/20/2024 patient verbally stated she would want her middle son, Star Mckinnon, to be decision maker at [phone]  - will follow up at future visits to fill out ACP forms  - goals: improvement in symptoms to be able to go on cruise on Thursday 4/25/24 with friends; also continue life prolonging  measures  - code status not discussed    Cancer related pain  - patient reporting severe pain in left hip; she also has pain at times in abdomen/groin and acute tenderness from new bilateral nephrostomy tube placements  - 24 hour OME: 230  - outpatient regimen: oxycodone-apap  mg q4h prn, gabapentin 300 mg qhs, and flexeril 5 mg q8h prn. This was not providing any relief.  - Continue Oxycontin 40 mg PO BID  - Increase oxycodone 15 mg to 20 mg PO q4h PRN severe pain   - May benefit from offer-may-refuse schedule  - Continue hydromorphone 1 mg IV q4h PRN breakthrough pain  - Rad/onc engaged, performed simulation on 4/23  - Continue gabapentin 300 mg PO qHS (unable to increase due to creatinine clearance)    Nausea/Anorexia  - patient with increased nausea due to increased pain  - patient with poor appetite constantly  - would benefit from nutrition consult while in the hospital  - continue zofran 4 mg q8h prn and prochlorperazine 10 mg q6h prn    Constipation  - ongoing issue  - uses senna at home with some relief  - continue senna and miralax as ordered    Anxiety/depression/spiritual distress  - patient feeling very overwhelmed with news of progression  - on 4/21 patient spoke of possible urologic surgery (stents vs nephrostomy tubes). Patient feeling very overwhelmed by news of additional issues. Patient upset with ongoing bad news as well as possibility of not being able to make trip with her friends. Patient spoke about how she feels that this is her last trip she will be able to make, and she wants to be able to enjoy herself and the time she has left.   - she reports not feeling at peace spiritually  - 4/23: Emotional support provided in context of her malignancy and the barriers it has created in living her life (like attending cruise with friends this Thursday 4/25/24) and her understandable fears related to perception from others related to opioids for appropriate treatment of neoplasm-related pain.  -  continue Wellbutrin, lorazepam, and mirtazapine  - recommend scheduling mirtazapine 7.5 mg qhs        I will follow-up with patient. Please contact us if you have any additional questions.    Subjective:     Chief Complaint:   Chief Complaint   Patient presents with    Leg Pain     Left upper thigh pain and swelling. Difficulty ambulating. Denies trauma. On chemo for metastatic colon cancer.         HPI:   Ms. 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 Of note, patient's recent imaging showed evidence of progression. Outpatient plan to start fruquintinib. In ED patient noted to have MARILOU. Imaging showed tumor involvement at left hip iliopsoas. Patient admitted for pain management and MARILOU. Palliative Medicine consulted for symptom management.    Hospital Course:  No notes on file    Interval History: Did not feel sleepy/groggy with increase in Oxycontin yesterday. In fact, expressed fears that her pain is still not quite controlled and worried how she will be left in severe pain when she is at home. Agreeable with small adjustment in IR oxy.    Prior 24 hour MME: 230  Oxycontin 40 mg BID x2 = 120 MME  Oxycodone 15 mg PO x4 = 90 MME  Hydromorphone 1 mg IV x1 = 20 MME    Medications:  Continuous Infusions:  Current Facility-Administered Medications   Medication Dose Route Frequency Last Rate Last Admin     Scheduled Meds:  Current Facility-Administered Medications   Medication Dose Route Frequency    acetaminophen  1,000 mg Oral Q8H    aluminum-magnesium hydroxide-simethicone  30 mL Oral QID (AC & HS)    buPROPion  150 mg Oral Daily    cinacalcet  30 mg Oral Daily with breakfast    famotidine  20 mg Oral Daily    gabapentin  300 mg Oral QHS    heparin (porcine)  7,500 Units Subcutaneous Q8H    levothyroxine  350 mcg Oral Before breakfast    mupirocin   Nasal BID    oxyCODONE  40 mg Oral Q12H     polyethylene glycol  17 g Oral Daily    predniSONE  5 mg Oral Daily    senna-docusate 8.6-50 mg  1 tablet Oral BID    sucralfate  1 g Oral QID (AC & HS)     PRN Meds:  Current Facility-Administered Medications:     calcium carbonate, 1,000 mg, Oral, TID PRN    dextrose 10%, 12.5 g, Intravenous, PRN    dextrose 10%, 25 g, Intravenous, PRN    dicyclomine, 20 mg, Oral, BID PRN    fentaNYL, 25 mcg, Intravenous, Q5 Min PRN    glucagon (human recombinant), 1 mg, Intramuscular, PRN    glucose, 16 g, Oral, PRN    glucose, 24 g, Oral, PRN    HYDROmorphone, 1 mg, Intravenous, Q4H PRN    lactulose, 10 g, Oral, Q6H PRN    LORazepam, 1 mg, Oral, Q12H PRN    mirtazapine, 7.5 mg, Oral, Nightly PRN    naloxone, 0.02 mg, Intravenous, PRN    ondansetron, 4 mg, Oral, Q8H PRN    ondansetron, 4 mg, Intravenous, Daily PRN    oxyCODONE, 20 mg, Oral, Q4H PRN    prochlorperazine, 10 mg, Oral, Q6H PRN    sodium chloride 0.9%, 10 mL, Intravenous, Q12H PRN    Objective:     Vital Signs (Most Recent):  Temp: 97.8 °F (36.6 °C) (04/25/24 0721)  Pulse: 106 (04/25/24 0721)  Resp: 16 (04/25/24 0835)  BP: 117/62 (04/25/24 0721)  SpO2: (!) 93 % (04/25/24 0721) Vital Signs (24h Range):  Temp:  [97.5 °F (36.4 °C)-98.1 °F (36.7 °C)] 97.8 °F (36.6 °C)  Pulse:  [103-106] 106  Resp:  [16-18] 16  SpO2:  [92 %-98 %] 93 %  BP: ()/(62-73) 117/62     Weight: 135 kg (297 lb 9.9 oz)  Body mass index is 48.04 kg/m².       Physical Exam  Vitals reviewed.   Constitutional:       General: She is not in acute distress.     Appearance: She is not toxic-appearing or diaphoretic.   HENT:      Head: Normocephalic and atraumatic.      Right Ear: External ear normal.      Left Ear: External ear normal.      Nose: Nose normal.      Mouth/Throat:      Mouth: Mucous membranes are moist.   Eyes:      General: No scleral icterus.        Right eye: No discharge.         Left eye: No discharge.      Extraocular Movements: Extraocular movements intact.   Neck:      Comments:  Trachea midline  Cardiovascular:      Rate and Rhythm: Normal rate.   Pulmonary:      Effort: Pulmonary effort is normal. No respiratory distress.   Abdominal:      Palpations: Abdomen is soft.   Musculoskeletal:         General: No deformity or signs of injury.      Cervical back: Normal range of motion.   Skin:     General: Skin is dry.      Coloration: Skin is not jaundiced.      Findings: No rash.   Neurological:      General: No focal deficit present.      Mental Status: She is alert and oriented to person, place, and time.      Cranial Nerves: No cranial nerve deficit.   Psychiatric:         Mood and Affect: Mood is anxious and depressed. Affect is tearful.         Behavior: Behavior normal.         Judgment: Judgment normal.            Review of Symptoms      Symptom Assessment (ESAS 0-10 Scale)  Pain:  8  Dyspnea:  0  Anxiety:  8  Nausea:  0  Depression:  8  Anorexia:  5  Fatigue:  6  Insomnia:  0  Restlessness:  0  Agitation:  0     CAM / Delirium:  Negative  Constipation:  Positive  Diarrhea:  Negative      Bowel Management Plan (BMP):  Yes      Pain Assessment:  OME in 24 hours:  125  Location(s): leg    Leg       Location: left        Quality: Aching, burning, sharp and pressure-like        Quantity: 8/10 in intensity        Chronicity: Onset 1 (greater than) week(s) ago, unchanged        Aggravating Factors: Activity and walking        Alleviating Factors: Opiates        Associated Symptoms: None    Modified Ramona Scale:  0    ECOG Performance Status stGstrstastdstest:st st1st Living Arrangements:  Lives with family and Lives in home    Psychosocial/Cultural:   See Palliative Psychosocial Note: No  Social Issues Identified: Coping deficit pt/family and Mental Health  Bereavement Risk: No  Caregiver Needs Discussed. Caregiver Distress: No: n/a  Cultural: patient enjoys shopping; she has three children (2 sons and 1 daughter). Has good support with her mother and close friends.   **Primary  to  "Follow**  Palliative Care  Consult: No    Spiritual:  F - Marilyn and Belief:  Yes  I - Importance:  Yes  C - Community:  Has   A - Address in Care:  In distress        Advance Care Planning  Advance Directives:   Living Will: No    LaPOST: No    Do Not Resuscitate Status: No    Medical Power of : No        Oral Declaration: Yes  Agent's Name:  Star Mckinnon   Agent's Contact Number:  [phone]    Decision Making:  Patient answered questions  Goals of Care: What is most important right now is to focus on remaining as independent as possible, symptom/pain control, extending life as long as possible, even it it means sacrificing quality. Accordingly, we have decided that the best plan to meet the patient's goals includes continuing with treatment.         Significant Labs: All pertinent labs within the past 24 hours have been reviewed.  CBC:   Recent Labs   Lab 04/25/24  0613   WBC 5.83   HGB 11.1*   HCT 35.2*   MCV 93        BMP:  Recent Labs   Lab 04/25/24  0613   GLU 95   *   K 4.5      CO2 13*   BUN 54*   CREATININE 3.4*   CALCIUM 9.2   MG 2.3     LFT:  Lab Results   Component Value Date    AST 16 04/25/2024    ALKPHOS 107 04/25/2024    BILITOT 0.3 04/25/2024     Albumin:   Albumin   Date Value Ref Range Status   04/25/2024 2.5 (L) 3.5 - 5.2 g/dL Final     Protein:   Total Protein   Date Value Ref Range Status   04/25/2024 6.1 6.0 - 8.4 g/dL Final     Lactic acid:   No results found for: "LACTATE"    Significant Imaging: I have reviewed all pertinent imaging results/findings within the past 24 hours.    04/20/2024 US retroperitoneal: "Moderate bilateral hydronephrosis, corresponding to findings on prior CT performed 03/11/2024. At least 1 nonobstructing right renal calculus is noted.  Additional smaller no calculi seen on prior CT are not well characterized. Nonspecific increased attenuation dependently within the urinary bladder.  No definite internal vascularity on " "single provided image.  Findings are nonspecific and could relate to debris and/or hemorrhage.  Soft tissue mass would be difficult to exclude on limited images.  Cross-sectional imaging could be considered for further characterization."     I spent a total of 50 minutes on the day of the visit. This includes face to face time in discussion of goals of care, symptom assessment, coordination of care and emotional support. This also includes non-face to face time preparing to see the patient (eg, review of tests/imaging), obtaining and/or reviewing separately obtained history, documenting clinical information in the electronic or other health record, independently interpreting results and communicating results to the patient/family/caregiver, or care coordinator.    Raquel Vicente MD  Palliative Medicine  [facility] - Transplant Stepdown                "

## 2024-04-25 NOTE — ASSESSMENT & PLAN NOTE
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 Pain improving with adjustments made to regimen per palliative.  - S/p palliative radiation to L iliopsoas      Plan:  - palliative medicine following  - increasing oxycodone to 20 mg q4h prn first line and continue hydromorphone 1 mg IVP as second line therapy  - Continue Oxycontin 40 mg BID  - prednisone 5 mg daily for inflammatory pain given muscle involvement  - gabapentin to 300 mg Daily

## 2024-04-25 NOTE — PROGRESS NOTES
Admit Assessment    Patient Identification  Gail Mckinnon   :  1970  Admit Date:  2024  Attending Provider:  Virgie Rice MD              Referral:   Pt was admitted to  with a diagnosis of Hip pain, and was admitted this hospital stay due to Leg pain [M79.606]  MARILOU (acute kidney injury) [N17.9]  Chest pain [R07.9].       is involved was referred to the Social Work Department via routine referral.  Patient presents as a 54 y.o. year old  female.    Persons interviewed : Verónicaeint. Her daughter and mother were present as well    Living Situation:      Resides at [address] [place] [address] [facility] [address], phone: [phone] (home).  Lives with her daughter      Current or Past Agencies and Description of Services/Supplies    DME: Patient not using any DME prior to hospitalization. May need equipment upon d/c awaiting recommendations      Home Health: Was not active on home health services prior to admission      IV Infusion: n/a    Nutrition: oral    Outpatient Pharmacy:     [facility]  [address] [facility] [address] [place] [address]  Phone: [phone] Fax: [phone]    [facility] - [place], [place] - [address] [facility] [address]  [address]  [facility] [address]  Phone: [phone] Fax: [phone]    [facility]  [address] [facility] [address]  Phone: [phone] Fax: [phone]      Patient Preference of agencies include Does not express a preference     Patient/Caregiver informed of right to choose providers or agencies.  Patient provides permission to release any necessary information to [facility] and to Non[facility] agencies as needed to facilitate patient care, treatment planning, and patient discharge planning.  Written and verbal resources provided.      Coping: Identifies that she is not coping well. She does have a therapist she does see on the [facility]. Discussed having  onc/pscyh see her in the hospital which she agrees could be beneficial          Adjustment to Diagnosis and Treatment  Appropriate    Emotional/Behavioral/Cognitive Issues: Yes            History/Current Symptoms of Anxiety/Depression: No:   History/Current Substance Use:   Social History     Tobacco Use    Smoking status: Never    Smokeless tobacco: Never   Substance and Sexual Activity    Alcohol use: Yes     Comment: occasionally- twice monthly    Drug use: Never    Sexual activity: Yes     Partners: Male     Birth control/protection: See Surgical Hx       Indications of Abuse/Neglect: No:   Abuse Screen (yes response referral indicated)  Feels Unsafe at Home or Work/School: no  Feels Threatened by Someone: no  Does anyone try to keep you from having contact with others or doing things outside your home?: no  Physical Signs of Abuse Present: no    Financial:  Payer/Plan Subscr  Sex Relation Sub. Ins. ID Effective Group Num   1. [place]* [place]* 1970 Female Self [id number]* 10/1/22 [id number]                                   [address] [address]                            Other identified concerns/needs: None at this time    Plan: TBD still awaiting evaluations    Interventions/Referrals: None at this time TBD  Patient/caregiver engaged in treatment planning process.     providing psychosocial and supportive counseling, resources, education, assistance and discharge planning as appropriate.  Patient/caregiver state understanding of  available resources,  following, remains available.

## 2024-04-25 NOTE — SUBJECTIVE & OBJECTIVE
Interval History: No acute events overnight, afebrile, hemodynamically stable. Creatinine improved. Received palliative radiation yesterday, PT/OT consulted. Palliative medicine uptitrating pt medications. Still in considerable pain.    Oncology Treatment Plan:   [No matching plan found]    Medications:  Continuous Infusions:  Current Facility-Administered Medications   Medication Dose Route Frequency Last Rate Last Admin     Scheduled Meds:  Current Facility-Administered Medications   Medication Dose Route Frequency    acetaminophen  1,000 mg Oral Q8H    aluminum-magnesium hydroxide-simethicone  30 mL Oral QID (AC & HS)    buPROPion  150 mg Oral Daily    cinacalcet  30 mg Oral Daily with breakfast    famotidine  20 mg Oral Daily    gabapentin  300 mg Oral QHS    heparin (porcine)  7,500 Units Subcutaneous Q8H    levothyroxine  350 mcg Oral Before breakfast    mupirocin   Nasal BID    oxyCODONE  40 mg Oral Q12H    polyethylene glycol  17 g Oral TID    predniSONE  5 mg Oral Daily    senna-docusate 8.6-50 mg  2 tablet Oral BID    sucralfate  1 g Oral QID (AC & HS)     PRN Meds:  Current Facility-Administered Medications:     calcium carbonate, 1,000 mg, Oral, TID PRN    dextrose 10%, 12.5 g, Intravenous, PRN    dextrose 10%, 25 g, Intravenous, PRN    dicyclomine, 20 mg, Oral, BID PRN    fentaNYL, 25 mcg, Intravenous, Q5 Min PRN    glucagon (human recombinant), 1 mg, Intramuscular, PRN    glucose, 16 g, Oral, PRN    glucose, 24 g, Oral, PRN    HYDROmorphone, 1 mg, Intravenous, Q4H PRN    lactulose, 10 g, Oral, Q6H PRN    LORazepam, 1 mg, Oral, Q12H PRN    mirtazapine, 7.5 mg, Oral, Nightly PRN    naloxone, 0.02 mg, Intravenous, PRN    ondansetron, 4 mg, Oral, Q8H PRN    ondansetron, 4 mg, Intravenous, Daily PRN    oxyCODONE, 20 mg, Oral, Q4H PRN    prochlorperazine, 10 mg, Oral, Q6H PRN    sodium chloride 0.9%, 10 mL, Intravenous, Q12H PRN     Review of Systems  Objective:     Vital Signs (Most Recent):  Temp: 98.4 °F  (36.9 °C) (04/25/24 1142)  Pulse: (!) 112 (04/25/24 1142)  Resp: 16 (04/25/24 1142)  BP: 125/70 (04/25/24 1142)  SpO2: (!) 93 % (04/25/24 1142) Vital Signs (24h Range):  Temp:  [97.7 °F (36.5 °C)-98.4 °F (36.9 °C)] 98.4 °F (36.9 °C)  Pulse:  [103-112] 112  Resp:  [16-18] 16  SpO2:  [92 %-98 %] 93 %  BP: ()/(62-73) 125/70     Weight: 135 kg (297 lb 9.9 oz)  Body mass index is 48.04 kg/m².  Body surface area is 2.51 meters squared.      Intake/Output Summary (Last 24 hours) at 4/25/2024 1430  Last data filed at 4/25/2024 0617  Gross per 24 hour   Intake 750 ml   Output 500 ml   Net 250 ml        Physical Exam  Vitals reviewed.   Constitutional:       General: She is not in acute distress.     Appearance: She is not toxic-appearing or diaphoretic.   HENT:      Head: Normocephalic and atraumatic.      Right Ear: External ear normal.      Left Ear: External ear normal.      Nose: Nose normal.      Mouth/Throat:      Mouth: Mucous membranes are moist.   Eyes:      General: No scleral icterus.        Right eye: No discharge.         Left eye: No discharge.      Extraocular Movements: Extraocular movements intact.   Neck:      Comments: Trachea midline  Cardiovascular:      Rate and Rhythm: Normal rate.   Pulmonary:      Effort: Pulmonary effort is normal. No respiratory distress.   Abdominal:      General: There is distension.      Palpations: Abdomen is soft.      Tenderness: There is no abdominal tenderness.      Comments: Bilateral nephrostomies in place dressing c/d/i   Musculoskeletal:         General: No deformity or signs of injury.      Cervical back: Normal range of motion.      Right lower leg: Edema present.      Left lower leg: Edema present.   Skin:     General: Skin is dry.      Coloration: Skin is not jaundiced.      Findings: No rash.   Neurological:      General: No focal deficit present.      Mental Status: She is alert and oriented to person, place, and time.      Cranial Nerves: No cranial nerve  deficit.   Psychiatric:         Behavior: Behavior normal.         Judgment: Judgment normal.          Significant Labs:   All pertinent labs from the last 24 hours have been reviewed.    Diagnostic Results:  I have reviewed and interpreted all pertinent imaging results/findings within the past 24 hours.

## 2024-04-25 NOTE — PLAN OF CARE
Problem: Adult Inpatient Plan of Care  Goal: Plan of Care Review  Outcome: Progressing     Problem: Adult Inpatient Plan of Care  Goal: Patient-Specific Goal (Individualized)  Outcome: Progressing   VSS. Complains of pain in hip, PRN medication given. Nephrostomy bags intact with pink and red drainage. No acute changes overnight.

## 2024-04-25 NOTE — SUBJECTIVE & OBJECTIVE
Interval History: Did not feel sleepy/groggy with increase in Oxycontin yesterday. In fact, expressed fears that her pain is still not quite controlled and worried how she will be left in severe pain when she is at home. Agreeable with small adjustment in IR oxy.    Prior 24 hour MME: 230  Oxycontin 40 mg BID x2 = 120 MME  Oxycodone 15 mg PO x4 = 90 MME  Hydromorphone 1 mg IV x1 = 20 MME    Medications:  Continuous Infusions:  Current Facility-Administered Medications   Medication Dose Route Frequency Last Rate Last Admin     Scheduled Meds:  Current Facility-Administered Medications   Medication Dose Route Frequency    acetaminophen  1,000 mg Oral Q8H    aluminum-magnesium hydroxide-simethicone  30 mL Oral QID (AC & HS)    buPROPion  150 mg Oral Daily    cinacalcet  30 mg Oral Daily with breakfast    famotidine  20 mg Oral Daily    gabapentin  300 mg Oral QHS    heparin (porcine)  7,500 Units Subcutaneous Q8H    levothyroxine  350 mcg Oral Before breakfast    mupirocin   Nasal BID    oxyCODONE  40 mg Oral Q12H    polyethylene glycol  17 g Oral Daily    predniSONE  5 mg Oral Daily    senna-docusate 8.6-50 mg  1 tablet Oral BID    sucralfate  1 g Oral QID (AC & HS)     PRN Meds:  Current Facility-Administered Medications:     calcium carbonate, 1,000 mg, Oral, TID PRN    dextrose 10%, 12.5 g, Intravenous, PRN    dextrose 10%, 25 g, Intravenous, PRN    dicyclomine, 20 mg, Oral, BID PRN    fentaNYL, 25 mcg, Intravenous, Q5 Min PRN    glucagon (human recombinant), 1 mg, Intramuscular, PRN    glucose, 16 g, Oral, PRN    glucose, 24 g, Oral, PRN    HYDROmorphone, 1 mg, Intravenous, Q4H PRN    lactulose, 10 g, Oral, Q6H PRN    LORazepam, 1 mg, Oral, Q12H PRN    mirtazapine, 7.5 mg, Oral, Nightly PRN    naloxone, 0.02 mg, Intravenous, PRN    ondansetron, 4 mg, Oral, Q8H PRN    ondansetron, 4 mg, Intravenous, Daily PRN    oxyCODONE, 20 mg, Oral, Q4H PRN    prochlorperazine, 10 mg, Oral, Q6H PRN    sodium chloride 0.9%, 10  mL, Intravenous, Q12H PRN    Objective:     Vital Signs (Most Recent):  Temp: 97.8 °F (36.6 °C) (04/25/24 0721)  Pulse: 106 (04/25/24 0721)  Resp: 16 (04/25/24 0835)  BP: 117/62 (04/25/24 0721)  SpO2: (!) 93 % (04/25/24 0721) Vital Signs (24h Range):  Temp:  [97.5 °F (36.4 °C)-98.1 °F (36.7 °C)] 97.8 °F (36.6 °C)  Pulse:  [103-106] 106  Resp:  [16-18] 16  SpO2:  [92 %-98 %] 93 %  BP: ()/(62-73) 117/62     Weight: 135 kg (297 lb 9.9 oz)  Body mass index is 48.04 kg/m².       Physical Exam  Vitals reviewed.   Constitutional:       General: She is not in acute distress.     Appearance: She is not toxic-appearing or diaphoretic.   HENT:      Head: Normocephalic and atraumatic.      Right Ear: External ear normal.      Left Ear: External ear normal.      Nose: Nose normal.      Mouth/Throat:      Mouth: Mucous membranes are moist.   Eyes:      General: No scleral icterus.        Right eye: No discharge.         Left eye: No discharge.      Extraocular Movements: Extraocular movements intact.   Neck:      Comments: Trachea midline  Cardiovascular:      Rate and Rhythm: Normal rate.   Pulmonary:      Effort: Pulmonary effort is normal. No respiratory distress.   Abdominal:      Palpations: Abdomen is soft.   Musculoskeletal:         General: No deformity or signs of injury.      Cervical back: Normal range of motion.   Skin:     General: Skin is dry.      Coloration: Skin is not jaundiced.      Findings: No rash.   Neurological:      General: No focal deficit present.      Mental Status: She is alert and oriented to person, place, and time.      Cranial Nerves: No cranial nerve deficit.   Psychiatric:         Mood and Affect: Mood is anxious and depressed. Affect is tearful.         Behavior: Behavior normal.         Judgment: Judgment normal.            Review of Symptoms      Symptom Assessment (ESAS 0-10 Scale)  Pain:  8  Dyspnea:  0  Anxiety:  8  Nausea:  0  Depression:  8  Anorexia:  5  Fatigue:  6  Insomnia:   0  Restlessness:  0  Agitation:  0     CAM / Delirium:  Negative  Constipation:  Positive  Diarrhea:  Negative      Bowel Management Plan (BMP):  Yes      Pain Assessment:  OME in 24 hours:  125  Location(s): leg    Leg       Location: left        Quality: Aching, burning, sharp and pressure-like        Quantity: 8/10 in intensity        Chronicity: Onset 1 (greater than) week(s) ago, unchanged        Aggravating Factors: Activity and walking        Alleviating Factors: Opiates        Associated Symptoms: None    Modified Ramona Scale:  0    ECOG Performance Status stGstrstastdstest:st st1st Living Arrangements:  Lives with family and Lives in home    Psychosocial/Cultural:   See Palliative Psychosocial Note: No  Social Issues Identified: Coping deficit pt/family and Mental Health  Bereavement Risk: No  Caregiver Needs Discussed. Caregiver Distress: No: n/a  Cultural: patient enjoys shopping; she has three children (2 sons and 1 daughter). Has good support with her mother and close friends.   **Primary  to Follow**  Palliative Care  Consult: No    Spiritual:  F - Marilyn and Belief:  Yes  I - Importance:  Yes  C - Community:  Has   A - Address in Care:  In distress        Advance Care Planning   Advance Directives:   Living Will: No    [place]POST: No    Do Not Resuscitate Status: No    Medical Power of : No        Oral Declaration: Yes  Agent's Name:  Star Mckinnon   Agent's Contact Number:  [phone]    Decision Making:  Patient answered questions  Goals of Care: What is most important right now is to focus on remaining as independent as possible, symptom/pain control, extending life as long as possible, even it it means sacrificing quality. Accordingly, we have decided that the best plan to meet the patient's goals includes continuing with treatment.         Significant Labs: All pertinent labs within the past 24 hours have been reviewed.  CBC:   Recent Labs   Lab 04/25/24  0613   WBC 5.83  "  HGB 11.1*   HCT 35.2*   MCV 93        BMP:  Recent Labs   Lab 04/25/24  0613   GLU 95   *   K 4.5      CO2 13*   BUN 54*   CREATININE 3.4*   CALCIUM 9.2   MG 2.3     LFT:  Lab Results   Component Value Date    AST 16 04/25/2024    ALKPHOS 107 04/25/2024    BILITOT 0.3 04/25/2024     Albumin:   Albumin   Date Value Ref Range Status   04/25/2024 2.5 (L) 3.5 - 5.2 g/dL Final     Protein:   Total Protein   Date Value Ref Range Status   04/25/2024 6.1 6.0 - 8.4 g/dL Final     Lactic acid:   No results found for: "LACTATE"    Significant Imaging: I have reviewed all pertinent imaging results/findings within the past 24 hours.    04/20/2024 US retroperitoneal: "Moderate bilateral hydronephrosis, corresponding to findings on prior CT performed 03/11/2024. At least 1 nonobstructing right renal calculus is noted.  Additional smaller no calculi seen on prior CT are not well characterized. Nonspecific increased attenuation dependently within the urinary bladder.  No definite internal vascularity on single provided image.  Findings are nonspecific and could relate to debris and/or hemorrhage.  Soft tissue mass would be difficult to exclude on limited images.  Cross-sectional imaging could be considered for further characterization."   "

## 2024-04-25 NOTE — CHAPLAIN
"Palliative Care     Patient: Gail Mckinnon  MRN: [id number]  : 1970  Age: 54 y.o.  Hospital Length of Stay: 5  Code Status: Code Status Discussion Note  Attending Provider: Virgie Rice MD  Principal Problem: Hip pain    Non-clinical observations of patient/room: Visited pall med pt again today with both mom and daughter bedside; pt was awake, alert, communicative, just spoke up from nap and said she's in pain; 10 min visit    Provided compassionate presence and tender touch to pt's arm; she said Pall med doc came by this morning and is helping adjust her meds as she still has pain. Pt's mom was very talkative and engaged yesterday, but not as much today. The only different is the daughter is bedside now as well. If I recall, pt and mom said the daughter lives with pt and "is in a lot of distress" about her mom's condition and prognosis, etc. Hopefully I'll have opportunity speak with daughter alone as well as pt alone. I sensed that is why dialog seemed reserved and guarded today.    Pt was smiling and thanked me for the visit. Told her I'd visit again. Lord, in your mercy.      **Spiritual Care Dept. Chaplains are available evenings, overnight and weekends **    In Peace,    Rev. Lisa Green MDiv, [facility]  Board Certified   Palliative Medicine Department  [phone]  "

## 2024-04-25 NOTE — DISCHARGE INSTRUCTIONS
You will follow up with Interventional Radiology in 3 months for nephrostomy tube exchange. Referral has been placed. No restrictions on positioning with nephrostomy tubes. Make sure to not let bags freely hang.

## 2024-04-26 PROBLEM — G93.41 ACUTE METABOLIC ENCEPHALOPATHY: Status: ACTIVE | Noted: 2024-01-01

## 2024-04-26 PROBLEM — J96.01 ACUTE HYPOXEMIC RESPIRATORY FAILURE: Status: ACTIVE | Noted: 2024-01-01

## 2024-04-26 NOTE — PSYCH
Attempted to see patient for Onc Psych consult. Patient visiting with several family members and asked that we return at a different time.    Will attempt consult on monday. Any urgent concerns over weekend can be directly to gen psychiatry.

## 2024-04-26 NOTE — ASSESSMENT & PLAN NOTE
Patient with Hypoxic Respiratory failure which is Acute.  she is not on home oxygen. Supplemental oxygen was provided and noted-      .   Signs/symptoms of respiratory failure include- tachypnea and respiratory distress. Contributing diagnoses includes -  Volume overload  Labs and images were reviewed. Patient Has not had a recent ABG. Will treat underlying causes and adjust management of respiratory failure as follows-     Will diurese for euvolemia as well as investigate for DVT with US for now.

## 2024-04-26 NOTE — PLAN OF CARE
Problem: Physical Therapy  Goal: Physical Therapy Goal  Description: Goals to be met by: 24     Patient will increase functional independence with mobility by performin. Supine to sit with Stand-by Assistance  2. Sit to supine with Stand-by Assistance  3. Sit to stand transfer with Stand-by Assistance  4. Bed to chair transfer with Stand-by Assistance using Rolling Walker or LRAD  5. Gait  x 100 feet with Stand-by Assistance using Rolling Walker.   6. Ascend/descend 2 stair with right or left Handrails Contact Guard Assistance using No Assistive Device.   7. Lower extremity exercise program x15 reps per handout, with assistance as needed    PT Evaluation completed 2024   PT goals and POC established.     Outcome: Progressing

## 2024-04-26 NOTE — SUBJECTIVE & OBJECTIVE
Interval History: Confused this morning, mother at bedside. Chest x-ray performed and planning for diuresis. Not requiring supplemental oxygen support.    Prior 24 hour MME: 230 --> 150 MME  Oxycontin 40 mg BID x2 = 120 MME  Oxycodone 20 mg x1 = 30 MME  Hydromorphone 1 mg IV x0 = 0 MME    Medications:  Continuous Infusions:  Current Facility-Administered Medications   Medication Dose Route Frequency Last Rate Last Admin     Scheduled Meds:  Current Facility-Administered Medications   Medication Dose Route Frequency    acetaminophen  1,000 mg Oral Q8H    aluminum-magnesium hydroxide-simethicone  30 mL Oral QID (AC & HS)    buPROPion  150 mg Oral Daily    cinacalcet  30 mg Oral Daily with breakfast    famotidine  20 mg Oral Daily    gabapentin  300 mg Oral QHS    heparin (porcine)  7,500 Units Subcutaneous Q8H    levothyroxine  350 mcg Oral Before breakfast    oxyCODONE  40 mg Oral Q12H    polyethylene glycol  17 g Oral TID    predniSONE  5 mg Oral Daily    senna-docusate 8.6-50 mg  2 tablet Oral BID    sucralfate  1 g Oral QID (AC & HS)     PRN Meds:  Current Facility-Administered Medications:     calcium carbonate, 1,000 mg, Oral, TID PRN    dextrose 10%, 12.5 g, Intravenous, PRN    dextrose 10%, 25 g, Intravenous, PRN    dicyclomine, 20 mg, Oral, BID PRN    fentaNYL, 25 mcg, Intravenous, Q5 Min PRN    glucagon (human recombinant), 1 mg, Intramuscular, PRN    glucose, 16 g, Oral, PRN    glucose, 24 g, Oral, PRN    HYDROmorphone, 1 mg, Intravenous, Q4H PRN    lactulose, 10 g, Oral, Q6H PRN    LORazepam, 1 mg, Oral, Q12H PRN    mirtazapine, 7.5 mg, Oral, Nightly PRN    naloxone, 0.02 mg, Intravenous, PRN    ondansetron, 4 mg, Oral, Q8H PRN    ondansetron, 4 mg, Intravenous, Daily PRN    oxyCODONE, 20 mg, Oral, Q4H PRN    prochlorperazine, 10 mg, Oral, Q6H PRN    sodium chloride 0.9%, 10 mL, Intravenous, Q12H PRN    Objective:     Vital Signs (Most Recent):  Temp: 97.4 °F (36.3 °C) (04/26/24 0757)  Pulse: (!) 119  (04/26/24 0757)  Resp: (!) 25 (04/26/24 0931)  BP: (!) 146/81 (04/26/24 0757)  SpO2: (!) 91 % (04/26/24 0757) Vital Signs (24h Range):  Temp:  [97.4 °F (36.3 °C)-98.6 °F (37 °C)] 97.4 °F (36.3 °C)  Pulse:  [104-119] 119  Resp:  [16-25] 25  SpO2:  [90 %-93 %] 91 %  BP: ()/(49-81) 146/81     Weight: 135 kg (297 lb 9.9 oz)  Body mass index is 48.04 kg/m².       Physical Exam  Vitals reviewed.   Constitutional:       General: She is not in acute distress.     Appearance: She is not toxic-appearing or diaphoretic.   HENT:      Head: Normocephalic and atraumatic.      Right Ear: External ear normal.      Left Ear: External ear normal.      Nose: Nose normal.      Mouth/Throat:      Mouth: Mucous membranes are moist.   Eyes:      General: No scleral icterus.        Right eye: No discharge.         Left eye: No discharge.      Extraocular Movements: Extraocular movements intact.   Neck:      Comments: Trachea midline  Cardiovascular:      Rate and Rhythm: Normal rate.   Pulmonary:      Effort: Pulmonary effort is normal. No respiratory distress.   Abdominal:      Palpations: Abdomen is soft.   Musculoskeletal:         General: No deformity or signs of injury.      Cervical back: Normal range of motion.   Skin:     General: Skin is dry.      Coloration: Skin is not jaundiced.      Findings: No rash.   Neurological:      General: No focal deficit present.      Mental Status: She is alert. She is disoriented.      Cranial Nerves: No cranial nerve deficit.   Psychiatric:         Mood and Affect: Mood is anxious and depressed. Affect is tearful.         Behavior: Behavior normal.         Judgment: Judgment normal.            Review of Symptoms      Symptom Assessment (ESAS 0-10 Scale)  Pain:  8  Dyspnea:  0  Anxiety:  8  Nausea:  0  Depression:  8  Anorexia:  5  Fatigue:  6  Insomnia:  0  Restlessness:  0  Agitation:  0     CAM / Delirium:  Negative  Constipation:  Positive  Diarrhea:  Negative      Bowel Management Plan  (BMP):  Yes      Pain Assessment:  OME in 24 hours:  125  Location(s): leg    Leg       Location: left        Quality: Aching, burning, sharp and pressure-like        Quantity: 8/10 in intensity        Chronicity: Onset 1 (greater than) week(s) ago, unchanged        Aggravating Factors: Activity and walking        Alleviating Factors: Opiates        Associated Symptoms: None    Modified Ramona Scale:  0    ECOG Performance Status rdGrdrrdarddrderd:rd rd3rd Living Arrangements:  Lives with family and Lives in home    Psychosocial/Cultural:   See Palliative Psychosocial Note: No  Social Issues Identified: Coping deficit pt/family and Mental Health  Bereavement Risk: No  Caregiver Needs Discussed. Caregiver Distress: No: n/a  Cultural: patient enjoys shopping; she has three children (2 sons and 1 daughter). Has good support with her mother and close friends.   **Primary  to Follow**  Palliative Care  Consult: No    Spiritual:  F - Marilyn and Belief:  Yes  I - Importance:  Yes  C - Community:  Has   A - Address in Care:  In distress        Advance Care Planning   Advance Directives:   Living Will: No    LaPOST: No    Do Not Resuscitate Status: No    Medical Power of : No        Oral Declaration: Yes  Agent's Name:  Star Mckinnon   Agent's Contact Number:  [phone]    Decision Making:  Patient answered questions  Goals of Care: What is most important right now is to focus on remaining as independent as possible, symptom/pain control, extending life as long as possible, even it it means sacrificing quality. Accordingly, we have decided that the best plan to meet the patient's goals includes continuing with treatment.         Significant Labs: All pertinent labs within the past 24 hours have been reviewed.  CBC:   Recent Labs   Lab 04/26/24  0554   WBC 7.94   HGB 11.3*   HCT 35.0*   MCV 93        BMP:  Recent Labs   Lab 04/26/24  0554      *   K 4.7      CO2 13*   BUN  "61*   CREATININE 3.2*   CALCIUM 9.9   MG 2.7*     LFT:  Lab Results   Component Value Date    AST 15 04/26/2024    ALKPHOS 108 04/26/2024    BILITOT 0.3 04/26/2024     Albumin:   Albumin   Date Value Ref Range Status   04/26/2024 2.5 (L) 3.5 - 5.2 g/dL Final     Protein:   Total Protein   Date Value Ref Range Status   04/26/2024 6.4 6.0 - 8.4 g/dL Final     Lactic acid:   No results found for: "LACTATE"    Significant Imaging: I have reviewed all pertinent imaging results/findings within the past 24 hours.    04/20/2024 US retroperitoneal: "Moderate bilateral hydronephrosis, corresponding to findings on prior CT performed 03/11/2024. At least 1 nonobstructing right renal calculus is noted.  Additional smaller no calculi seen on prior CT are not well characterized. Nonspecific increased attenuation dependently within the urinary bladder.  No definite internal vascularity on single provided image.  Findings are nonspecific and could relate to debris and/or hemorrhage.  Soft tissue mass would be difficult to exclude on limited images.  Cross-sectional imaging could be considered for further characterization."   "

## 2024-04-26 NOTE — PLAN OF CARE
- Patient is alert and orientedX4.  - Sad, cooperative and accepting.  - VSS. Afebrile.Spo2 maintained@RA.  - Scheduled medicines given as per MAR.  - Scheduled Oxycodone given for pain management. Moderate relief obtained.  - LBM on 4/22. Patient is on scheduled stool softeners. See MAR. Patient verbalized that she is passing flatus, however no bowel movement this shift.  - B/L Neph tube in situ. Output dark, brown colored. See I/O flowsheet for the total output.  - Serum Cr elevated to 3.4.  - Palliative care following.  - Bed in low position. Wheels locked. Call light within patient's reach.  - No s/s of acute distress noted this shift.  - Free from falls and injuries.  - Plan of care ongoing,progressing.

## 2024-04-26 NOTE — SUBJECTIVE & OBJECTIVE
Interval History: A bit somnolent overnight and dyspneic this am in the setting of what appears to be volume overload. Lasix ordered. Aspiration precautions     Oncology Treatment Plan:   [No matching plan found]    Medications:  Continuous Infusions:  Current Facility-Administered Medications   Medication Dose Route Frequency Last Rate Last Admin     Scheduled Meds:  Current Facility-Administered Medications   Medication Dose Route Frequency    acetaminophen  1,000 mg Oral Q8H    aluminum-magnesium hydroxide-simethicone  30 mL Oral QID (AC & HS)    buPROPion  150 mg Oral Daily    cinacalcet  30 mg Oral Daily with breakfast    famotidine  20 mg Oral Daily    gabapentin  300 mg Oral QHS    heparin (porcine)  7,500 Units Subcutaneous Q8H    levothyroxine  350 mcg Oral Before breakfast    oxyCODONE  40 mg Oral Q12H    polyethylene glycol  17 g Oral TID    predniSONE  5 mg Oral Daily    senna-docusate 8.6-50 mg  2 tablet Oral BID    sucralfate  1 g Oral QID (AC & HS)     PRN Meds:  Current Facility-Administered Medications:     calcium carbonate, 1,000 mg, Oral, TID PRN    dextrose 10%, 12.5 g, Intravenous, PRN    dextrose 10%, 25 g, Intravenous, PRN    dicyclomine, 20 mg, Oral, BID PRN    fentaNYL, 25 mcg, Intravenous, Q5 Min PRN    glucagon (human recombinant), 1 mg, Intramuscular, PRN    glucose, 16 g, Oral, PRN    glucose, 24 g, Oral, PRN    HYDROmorphone, 1 mg, Intravenous, Q4H PRN    lactulose, 10 g, Oral, Q6H PRN    LORazepam, 1 mg, Oral, Q12H PRN    mirtazapine, 7.5 mg, Oral, Nightly PRN    naloxone, 0.02 mg, Intravenous, PRN    ondansetron, 4 mg, Oral, Q8H PRN    ondansetron, 4 mg, Intravenous, Daily PRN    oxyCODONE, 20 mg, Oral, Q4H PRN    prochlorperazine, 10 mg, Oral, Q6H PRN    sodium chloride 0.9%, 10 mL, Intravenous, Q12H PRN     Review of Systems   Constitutional:  Positive for fatigue. Negative for chills and fever.   Respiratory:  Positive for shortness of breath.    Cardiovascular:  Positive for leg  swelling.   Gastrointestinal:  Negative for nausea and vomiting.   Genitourinary:  Negative for decreased urine volume.   Musculoskeletal:  Positive for arthralgias and myalgias. Negative for gait problem.   Neurological:  Positive for weakness.   Psychiatric/Behavioral:  Negative for confusion.      Objective:     Vital Signs (Most Recent):  Temp: 97.8 °F (36.6 °C) (04/26/24 0945)  Pulse: (!) 122 (04/26/24 0945)  Resp: (!) 26 (04/26/24 0945)  BP: 123/70 (04/26/24 0945)  SpO2: 95 % (04/26/24 0945) Vital Signs (24h Range):  Temp:  [97.4 °F (36.3 °C)-98.6 °F (37 °C)] 97.8 °F (36.6 °C)  Pulse:  [104-122] 122  Resp:  [16-26] 26  SpO2:  [88 %-95 %] 95 %  BP: ()/(49-81) 123/70     Weight: 135 kg (297 lb 9.9 oz)  Body mass index is 48.04 kg/m².  Body surface area is 2.51 meters squared.      Intake/Output Summary (Last 24 hours) at 4/26/2024 1014  Last data filed at 4/26/2024 0604  Gross per 24 hour   Intake 200 ml   Output 675 ml   Net -475 ml        Physical Exam  Vitals and nursing note reviewed.   Constitutional:       General: She is not in acute distress.     Appearance: She is not toxic-appearing or diaphoretic.   HENT:      Head: Normocephalic and atraumatic.      Right Ear: External ear normal.      Left Ear: External ear normal.      Nose: Nose normal.      Mouth/Throat:      Mouth: Mucous membranes are moist.      Pharynx: No oropharyngeal exudate.   Eyes:      General: No scleral icterus.        Right eye: No discharge.         Left eye: No discharge.      Extraocular Movements: Extraocular movements intact.      Pupils: Pupils are equal, round, and reactive to light.   Neck:      Comments: Trachea midline  Cardiovascular:      Rate and Rhythm: Normal rate and regular rhythm.      Heart sounds: No murmur heard.     Comments: JVD  Pulmonary:      Effort: Pulmonary effort is normal. No respiratory distress.      Breath sounds: No wheezing or rales.   Abdominal:      General: Abdomen is flat. Bowel sounds  are normal. There is no distension.      Palpations: Abdomen is soft.      Tenderness: There is no abdominal tenderness. There is no guarding.      Comments: Bilateral nephrostomies in place dressing c/d/i   Musculoskeletal:         General: No swelling, tenderness, deformity or signs of injury. Normal range of motion.      Cervical back: Normal range of motion.      Right lower leg: Edema present.      Left lower leg: Edema present.   Lymphadenopathy:      Cervical: No cervical adenopathy.   Skin:     General: Skin is warm and dry.      Coloration: Skin is not jaundiced.      Findings: No bruising or rash.   Neurological:      General: No focal deficit present.      Mental Status: She is alert and oriented to person, place, and time.      Cranial Nerves: No cranial nerve deficit.   Psychiatric:         Behavior: Behavior normal.         Judgment: Judgment normal.          Significant Labs:   All pertinent labs from the last 24 hours have been reviewed.    Diagnostic Results:  I have reviewed all pertinent imaging results/findings within the past 24 hours.

## 2024-04-26 NOTE — ASSESSMENT & PLAN NOTE
Ms. 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 Of note, patient's recent imaging showed evidence of progression. Outpatient plan to start fruquintinib. In ED patient noted to have MARILOU. Imaging showed tumor involvement at left hip iliopsoas. S/p attempt for bilateral ureteral stents that was unsuccessful, followed by IR-guided bilateral nephrostomy tube placements for hydronephrosis on 4/22/24. Patient admitted for pain management and MARILOU. Palliative Medicine consulted for symptom management.    4/26/24: Confused this morning, tearful and saying she's in pain. Mother at bedside. Had taken 150 MME in prior 24 hours (just one PRN oxy 20 with scheduled Oxycontin 40 BID), and previously had taken 230 MME/day. At this time will not increase pain medicine dose though patient verbalizes that she's in severe pain. Will reevaluate later today. I worry a component of anxiety is also driving her pain.    Sigmoid cancer/MARILOU/bilateral hydronephrosis/HTN/thyroid problem/other medical problems  - plan per primary team and other speciality consultants (Urology)    GOC/ACP  - patient decisional and by herself in room  - no ACP documents uploaded into EMR  - patient follows with Palliative Medicine NP Claudio and Dr. Hill on [facility] already; next appt: 04/23/24   - On discharge, I will contact outpatient palliative medicine team on [place] to arrange new follow up appointment  - NOK: patient's children  - On 04/20/2024 patient verbally stated she would want her middle son, Star Mckinnon, to be decision maker at [phone]  - will follow up at future visits to fill out ACP forms  - goals: improvement in symptoms to be able to go on cruise on Thursday 4/25/24 with friends; also continue life prolonging measures  - code status not discussed    Cancer related pain  - patient reporting severe pain in  left hip; she also has pain at times in abdomen/groin and acute tenderness from new bilateral nephrostomy tube placements  - 24 hour OME: 230  - outpatient regimen: oxycodone-apap  mg q4h prn, gabapentin 300 mg qhs, and flexeril 5 mg q8h prn. This was not providing any relief.  - Continue Oxycontin 40 mg PO BID  - Continue oxycodone 20 mg PO q4h PRN severe pain   - May benefit from offer-may-refuse schedule  - Continue hydromorphone 1 mg IV q4h PRN breakthrough pain  - Rad/onc engaged, performed simulation on 4/23  - Continue gabapentin 300 mg PO qHS (unable to increase due to creatinine clearance)    Nausea/Anorexia  - patient with increased nausea due to increased pain  - patient with poor appetite constantly  - would benefit from nutrition consult while in the hospital  - continue zofran 4 mg q8h prn and prochlorperazine 10 mg q6h prn    Constipation  - ongoing issue  - uses senna at home with some relief  - continue senna and miralax as ordered    Anxiety/depression/spiritual distress  - patient feeling very overwhelmed with news of progression  - on 4/21 patient spoke of possible urologic surgery (stents vs nephrostomy tubes). Patient feeling very overwhelmed by news of additional issues. Patient upset with ongoing bad news as well as possibility of not being able to make trip with her friends. Patient spoke about how she feels that this is her last trip she will be able to make, and she wants to be able to enjoy herself and the time she has left.   - she reports not feeling at peace spiritually  - 4/23: Emotional support provided in context of her malignancy and the barriers it has created in living her life (like attending cruise with friends this Thursday 4/25/24) and her understandable fears related to perception from others related to opioids for appropriate treatment of neoplasm-related pain.  - continue Wellbutrin, lorazepam, and mirtazapine  - recommend scheduling mirtazapine 7.5 mg qhs

## 2024-04-26 NOTE — PROGRESS NOTES
[facility] - Transplant Stepdown  Palliative Medicine  Progress Note    Patient Name: Gail Mciknnon  MRN: [id number]  Admission Date: 4/19/2024  Hospital Length of Stay: 6 days  Code Status: Full Code   Attending Provider: Frances Luong MD  Consulting Provider: Raquel Vicente MD  Primary Care Physician: Marah Santo MD  Principal Problem:Hip pain    Patient information was obtained from patient, parent, and primary team.      Assessment/Plan:     Palliative Care  Palliative care encounter  Ms. 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 Of note, patient's recent imaging showed evidence of progression. Outpatient plan to start fruquintinib. In ED patient noted to have MARILOU. Imaging showed tumor involvement at left hip iliopsoas. S/p attempt for bilateral ureteral stents that was unsuccessful, followed by IR-guided bilateral nephrostomy tube placements for hydronephrosis on 4/22/24. Patient admitted for pain management and MARILOU. Palliative Medicine consulted for symptom management.    4/26/24: Confused this morning, tearful and saying she's in pain. Mother at bedside. Had taken 150 MME in prior 24 hours (just one PRN oxy 20 with scheduled Oxycontin 40 BID), and previously had taken 230 MME/day. At this time will not increase pain medicine dose though patient verbalizes that she's in severe pain. Will reevaluate later today. I worry a component of anxiety is also driving her pain.    Sigmoid cancer/MARILOU/bilateral hydronephrosis/HTN/thyroid problem/other medical problems  - plan per primary team and other speciality consultants (Urology)    GOC/ACP  - patient decisional and by herself in room  - no ACP documents uploaded into EMR  - patient follows with Palliative Medicine NP Claudio and Dr. Hill on [facility] already; next appt: 04/23/24   - On discharge, I will contact outpatient  palliative medicine team on [place] to arrange new follow up appointment  - NOK: patient's children  - On 04/20/2024 patient verbally stated she would want her middle son, Star Mckinnon, to be decision maker at [phone]  - will follow up at future visits to fill out ACP forms  - goals: improvement in symptoms to be able to go on cruise on Thursday 4/25/24 with friends; also continue life prolonging measures  - code status not discussed    Cancer related pain  - patient reporting severe pain in left hip; she also has pain at times in abdomen/groin and acute tenderness from new bilateral nephrostomy tube placements  - 24 hour OME: 230  - outpatient regimen: oxycodone-apap  mg q4h prn, gabapentin 300 mg qhs, and flexeril 5 mg q8h prn. This was not providing any relief.  - Continue Oxycontin 40 mg PO BID  - Continue oxycodone 20 mg PO q4h PRN severe pain   - May benefit from offer-may-refuse schedule  - Continue hydromorphone 1 mg IV q4h PRN breakthrough pain  - Rad/onc engaged, performed simulation on 4/23  - Continue gabapentin 300 mg PO qHS (unable to increase due to creatinine clearance)    Nausea/Anorexia  - patient with increased nausea due to increased pain  - patient with poor appetite constantly  - would benefit from nutrition consult while in the hospital  - continue zofran 4 mg q8h prn and prochlorperazine 10 mg q6h prn    Constipation  - ongoing issue  - uses senna at home with some relief  - continue senna and miralax as ordered    Anxiety/depression/spiritual distress  - patient feeling very overwhelmed with news of progression  - on 4/21 patient spoke of possible urologic surgery (stents vs nephrostomy tubes). Patient feeling very overwhelmed by news of additional issues. Patient upset with ongoing bad news as well as possibility of not being able to make trip with her friends. Patient spoke about how she feels that this is her last trip she will be able to make, and she wants to be able to  enjoy herself and the time she has left.   - she reports not feeling at peace spiritually  - 4/23: Emotional support provided in context of her malignancy and the barriers it has created in living her life (like attending cruise with friends this Thursday 4/25/24) and her understandable fears related to perception from others related to opioids for appropriate treatment of neoplasm-related pain.  - continue Wellbutrin, lorazepam, and mirtazapine  - recommend scheduling mirtazapine 7.5 mg qhs        I will follow-up with patient. Please contact us if you have any additional questions.    Subjective:     Chief Complaint:   Chief Complaint   Patient presents with    Leg Pain     Left upper thigh pain and swelling. Difficulty ambulating. Denies trauma. On chemo for metastatic colon cancer.         HPI:   Ms. Mckinnon is a 53 y/o F with HTN, depression/anxiety, thyroid problem, and sigmoid carcinoma (s/p multiple lines of treatment) presented with worsening left hip pain. Of note, patient's recent imaging showed evidence of progression. Outpatient plan to start fruquintinib. In ED patient noted to have MARILOU. Imaging showed tumor involvement at left hip iliopsoas. Patient admitted for pain management and MARILOU. Palliative Medicine consulted for symptom management.    Hospital Course:  No notes on file    Interval History: Confused this morning, mother at bedside. Chest x-ray performed and planning for diuresis. Not requiring supplemental oxygen support.    Prior 24 hour MME: 230 --> 150 MME  Oxycontin 40 mg BID x2 = 120 MME  Oxycodone 20 mg x1 = 30 MME  Hydromorphone 1 mg IV x0 = 0 MME    Medications:  Continuous Infusions:  Current Facility-Administered Medications   Medication Dose Route Frequency Last Rate Last Admin     Scheduled Meds:  Current Facility-Administered Medications   Medication Dose Route Frequency    acetaminophen  1,000 mg Oral Q8H    aluminum-magnesium hydroxide-simethicone  30 mL Oral QID (AC & HS)     buPROPion  150 mg Oral Daily    cinacalcet  30 mg Oral Daily with breakfast    famotidine  20 mg Oral Daily    gabapentin  300 mg Oral QHS    heparin (porcine)  7,500 Units Subcutaneous Q8H    levothyroxine  350 mcg Oral Before breakfast    oxyCODONE  40 mg Oral Q12H    polyethylene glycol  17 g Oral TID    predniSONE  5 mg Oral Daily    senna-docusate 8.6-50 mg  2 tablet Oral BID    sucralfate  1 g Oral QID (AC & HS)     PRN Meds:  Current Facility-Administered Medications:     calcium carbonate, 1,000 mg, Oral, TID PRN    dextrose 10%, 12.5 g, Intravenous, PRN    dextrose 10%, 25 g, Intravenous, PRN    dicyclomine, 20 mg, Oral, BID PRN    fentaNYL, 25 mcg, Intravenous, Q5 Min PRN    glucagon (human recombinant), 1 mg, Intramuscular, PRN    glucose, 16 g, Oral, PRN    glucose, 24 g, Oral, PRN    HYDROmorphone, 1 mg, Intravenous, Q4H PRN    lactulose, 10 g, Oral, Q6H PRN    LORazepam, 1 mg, Oral, Q12H PRN    mirtazapine, 7.5 mg, Oral, Nightly PRN    naloxone, 0.02 mg, Intravenous, PRN    ondansetron, 4 mg, Oral, Q8H PRN    ondansetron, 4 mg, Intravenous, Daily PRN    oxyCODONE, 20 mg, Oral, Q4H PRN    prochlorperazine, 10 mg, Oral, Q6H PRN    sodium chloride 0.9%, 10 mL, Intravenous, Q12H PRN    Objective:     Vital Signs (Most Recent):  Temp: 97.4 °F (36.3 °C) (04/26/24 0757)  Pulse: (!) 119 (04/26/24 0757)  Resp: (!) 25 (04/26/24 0931)  BP: (!) 146/81 (04/26/24 0757)  SpO2: (!) 91 % (04/26/24 0757) Vital Signs (24h Range):  Temp:  [97.4 °F (36.3 °C)-98.6 °F (37 °C)] 97.4 °F (36.3 °C)  Pulse:  [104-119] 119  Resp:  [16-25] 25  SpO2:  [90 %-93 %] 91 %  BP: ()/(49-81) 146/81     Weight: 135 kg (297 lb 9.9 oz)  Body mass index is 48.04 kg/m².       Physical Exam  Vitals reviewed.   Constitutional:       General: She is not in acute distress.     Appearance: She is not toxic-appearing or diaphoretic.   HENT:      Head: Normocephalic and atraumatic.      Right Ear: External ear normal.      Left Ear: External ear  normal.      Nose: Nose normal.      Mouth/Throat:      Mouth: Mucous membranes are moist.   Eyes:      General: No scleral icterus.        Right eye: No discharge.         Left eye: No discharge.      Extraocular Movements: Extraocular movements intact.   Neck:      Comments: Trachea midline  Cardiovascular:      Rate and Rhythm: Normal rate.   Pulmonary:      Effort: Pulmonary effort is normal. No respiratory distress.   Abdominal:      Palpations: Abdomen is soft.   Musculoskeletal:         General: No deformity or signs of injury.      Cervical back: Normal range of motion.   Skin:     General: Skin is dry.      Coloration: Skin is not jaundiced.      Findings: No rash.   Neurological:      General: No focal deficit present.      Mental Status: She is alert. She is disoriented.      Cranial Nerves: No cranial nerve deficit.   Psychiatric:         Mood and Affect: Mood is anxious and depressed. Affect is tearful.         Behavior: Behavior normal.         Judgment: Judgment normal.            Review of Symptoms      Symptom Assessment (ESAS 0-10 Scale)  Pain:  8  Dyspnea:  0  Anxiety:  8  Nausea:  0  Depression:  8  Anorexia:  5  Fatigue:  6  Insomnia:  0  Restlessness:  0  Agitation:  0     CAM / Delirium:  Negative  Constipation:  Positive  Diarrhea:  Negative      Bowel Management Plan (BMP):  Yes      Pain Assessment:  OME in 24 hours:  125  Location(s): leg    Leg       Location: left        Quality: Aching, burning, sharp and pressure-like        Quantity: 8/10 in intensity        Chronicity: Onset 1 (greater than) week(s) ago, unchanged        Aggravating Factors: Activity and walking        Alleviating Factors: Opiates        Associated Symptoms: None    Modified Ramona Scale:  0    ECOG Performance Status stGstrstastdstest:st st1st Living Arrangements:  Lives with family and Lives in home    Psychosocial/Cultural:   See Palliative Psychosocial Note: No  Social Issues Identified: Coping deficit pt/family and Mental  "Health  Bereavement Risk: No  Caregiver Needs Discussed. Caregiver Distress: No: n/a  Cultural: patient enjoys shopping; she has three children (2 sons and 1 daughter). Has good support with her mother and close friends.   **Primary  to Follow**  Palliative Care  Consult: No    Spiritual:  F - Marilyn and Belief:  Yes  I - Importance:  Yes  C - Community:  Has   A - Address in Care:  In distress        Advance Care Planning  Advance Directives:   Living Will: No    LaPOST: No    Do Not Resuscitate Status: No    Medical Power of : No        Oral Declaration: Yes  Agent's Name:  Star Mckinnon   Agent's Contact Number:  [phone]    Decision Making:  Patient answered questions  Goals of Care: What is most important right now is to focus on remaining as independent as possible, symptom/pain control, extending life as long as possible, even it it means sacrificing quality. Accordingly, we have decided that the best plan to meet the patient's goals includes continuing with treatment.         Significant Labs: All pertinent labs within the past 24 hours have been reviewed.  CBC:   Recent Labs   Lab 04/26/24  0554   WBC 7.94   HGB 11.3*   HCT 35.0*   MCV 93        BMP:  Recent Labs   Lab 04/26/24  0554      *   K 4.7      CO2 13*   BUN 61*   CREATININE 3.2*   CALCIUM 9.9   MG 2.7*     LFT:  Lab Results   Component Value Date    AST 15 04/26/2024    ALKPHOS 108 04/26/2024    BILITOT 0.3 04/26/2024     Albumin:   Albumin   Date Value Ref Range Status   04/26/2024 2.5 (L) 3.5 - 5.2 g/dL Final     Protein:   Total Protein   Date Value Ref Range Status   04/26/2024 6.4 6.0 - 8.4 g/dL Final     Lactic acid:   No results found for: "LACTATE"    Significant Imaging: I have reviewed all pertinent imaging results/findings within the past 24 hours.    04/20/2024 US retroperitoneal: "Moderate bilateral hydronephrosis, corresponding to findings on prior CT performed " "03/11/2024. At least 1 nonobstructing right renal calculus is noted.  Additional smaller no calculi seen on prior CT are not well characterized. Nonspecific increased attenuation dependently within the urinary bladder.  No definite internal vascularity on single provided image.  Findings are nonspecific and could relate to debris and/or hemorrhage.  Soft tissue mass would be difficult to exclude on limited images.  Cross-sectional imaging could be considered for further characterization."     I spent a total of 50 minutes on the day of the visit. This includes face to face time in discussion of goals of care, symptom assessment, coordination of care and emotional support. This also includes non-face to face time preparing to see the patient (eg, review of tests/imaging), obtaining and/or reviewing separately obtained history, documenting clinical information in the electronic or other health record, independently interpreting results and communicating results to the patient/family/caregiver, or care coordinator.    Raquel Vicente MD  Palliative Medicine  [facility] - Transplant Stepdown                "

## 2024-04-26 NOTE — CARE UPDATE
"RAPID RESPONSE NURSE PROACTIVE ROUNDING NOTE       Time of Visit: 1012    Admit Date: 2024  LOS: 6  Code Status: Full Code   Date of Visit: 2024  : 1970  Age: 54 y.o.  Sex: female  Race: White  Bed: 49499/42286 A:   MRN: [id number]  Was the patient discharged from an ICU this admission? No   Was the patient discharged from a PACU within last 24 hours? No   Did the patient receive conscious sedation/general anesthesia in last 24 hours? No  Was the patient in the ED within the past 24 hours? No  Was the patient on NIPPV within the past 24 hours? No   Attending Physician: Frances Luong MD  Primary Service: [facility] MEDICAL ONCOLOGY   Time spent at the bedside: < 15 min    SITUATION    Notified by bedside RN via phone call.  Reason for alert: Lethargy, confusion  Called to evaluate the patient for Neuro    BACKGROUND     Why is the patient in the hospital?: Hip pain    Patient has a past medical history of Anxiety, Depression, FH: ovarian cancer, psychiatric care, Hypertension, Hyperthyroidism, Hypothyroid, Kidney calculi, Malignant neoplasm of sigmoid colon, Menorrhagia, Multinodular goiter, Palpitation, Psychiatric problem, Venous insufficiency, and Vulvar lesion.    Last Vitals:  Temp: 97.8 °F (36.6 °C) (945)  Pulse: 122 (945)  Resp: 26 (945)  BP: 123/70 (945)  SpO2: 95 % (945)    24 Hours Vitals Range:  Temp:  [97.4 °F (36.3 °C)-98.6 °F (37 °C)]   Pulse:  [104-122]   Resp:  [16-26]   BP: ()/(49-81)   SpO2:  [88 %-95 %]     Labs:  Recent Labs     24  0544 24  0613 24  0554   WBC 6.08 5.83 7.94   HGB 11.0* 11.1* 11.3*   HCT 35.6* 35.2* 35.0*    328 328       Recent Labs     24  0544 24  0613 24  0554   * 134* 133*   K 4.3 4.5 4.7    110 109   CO2 12* 13* 13*   BUN 48* 54* 61*   CREATININE 3.6* 3.4* 3.2*   GLU 86 95 107   PHOS 4.2 4.0 4.2   MG 2.1 2.3 2.7*        No results for input(s): "PH", "PCO2", "PO2", " ""HCO3", "POCSATURATED", "BE" in the last 72 hours.     ASSESSMENT    Physical Exam  Constitutional:       Appearance: She is ill-appearing.   Cardiovascular:      Rate and Rhythm: Tachycardia present.   Pulmonary:      Effort: Tachypnea present.   Musculoskeletal:         General: Swelling present.   Skin:     Coloration: Skin is pale.   Neurological:      Mental Status: She is alert. She is disoriented.         INTERVENTIONS    The patient was seen for Neurological problem. Staff concerns included unexplained agitation or delirium. The following interventions were performed: Delirium precautions, NPO, lasix.    RECOMMENDATIONS    Strict I&O  Delirium precautions    PROVIDER ESCALATION    Yes/No  Yes    Orders received and case discussed with Dr. Luong .    Disposition: Remain in room 17161.    FOLLOW-UP    Charge Rupali KATE  updated on plan of care. Instructed to call the Rapid Response Nurse, Zaid Crespo RN at [phone] for additional questions or concerns.            "

## 2024-04-26 NOTE — PT/OT/SLP EVAL
Physical Therapy Evaluation    Patient Name:  Gail Mckinnon   MRN:  [id number]    Recommendations:     Discharge Recommendations: Low Intensity Therapy   Discharge Equipment Recommendations: walker, rolling, shower chair   Justification for Walker HME   The mobility limitation cannot be sufficiently resolved by the use of a cane.   Patient's functional mobility deficit can be sufficiently resolved with the use of a walker.  Patient's mobility limitation significantly impairs their ability to participate in one of more activities of daily living.  The use of a walker will significantly improve the patients ability to participate in MRADLS and the patient will use it on regular basis in the home.   Barriers to discharge: None    Assessment:     Gail Mckinnon is a 54 y.o. 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  She presents with the following impairments/functional limitations: weakness, impaired endurance, gait instability, impaired balance, pain, impaired cognition, impaired functional mobility, decreased lower extremity function Patient with increased lethargy, time to answer questions, and overall alertness during evaluation this date. RN and family reporting overall disorientation this date and lethargic d/t increased amount of testing this date, leading to bed-level evaluation. Mild difficulty with command following noted. Future sessions to emphasize OOB mobility, will follow up. Currently functioning below baseline Patient will continue to benefit from skilled PT during this admit to address BLE strength and endurance deficits, and maximize independence with functional mobility.    Rehab Prognosis: Good; patient would benefit from acute skilled PT services to address these deficits and reach maximum level of function.    Recent Surgery: Procedure(s) (LRB):  CYSTOSCOPY (N/A) 4 Days Post-Op    Plan:     During this hospitalization, patient to be seen 4 x/week  to address the identified rehab impairments via therapeutic activities, gait training, therapeutic exercises, neuromuscular re-education and progress toward the following goals:    Plan of Care Expires:  05/26/24    Subjective     Chief Complaint: fatigue  Patient/Family Comments/goals: return home to [facility]  Pain/Comfort:  Pain Rating 1:  (pt does not rate)  Location - Side 1: Left  Location 1: hip  Pain Addressed 1: Cessation of Activity  Pain Rating Post-Intervention 1:  (pt does not rate)    Patients cultural, spiritual, Synagogue conflicts given the current situation: no    Living Environment:  Patient lives with her daughter in a condo with 2 DAVID and 0 HR. The bathroom contains a tub-shower combo without a seat or grab bars inside.  Prior to admission, patients level of function was independent prior to 2 weeks ago. Daughter would provide light assistance with mobility and tasks d/t L hip pain.  Equipment used at home: none.  DME owned (not currently used): none.  Upon discharge, patient will have assistance from family, as daughter works during the day.    Objective:     Communicated with RN prior to session.  Patient found HOB elevated with oxygen, telemetry, peripheral IV, blood pressure cuff, pulse ox (continuous)  upon PT entry to room.    General Precautions: Standard, fall, aspiration  Orthopedic Precautions:N/A   Braces: N/A  Respiratory Status: Nasal cannula, flow 2 L/min    Exams:  Cognitive Exam:  Patient is oriented to Person, Place, Time, and increased time to answer questions, but provides appropriate answers  Skin Integrity/Edema:      -       edematous BLE  RLE ROM: hip/knee flex grossly 2+/5 in supine, ankle ROM WFL  RLE Strength: hip/knee flex grossly 2+/5 in supine, ankles WFL  LLE ROM: hip/knee flex grossly 2/5 in supine, daughter reports providing assist with LLE motion recently d/t pain, grimaces with movement  LLE Strength: hip/knee flex grossly 2/5 in supine, ankles WFL    Functional  Mobility:  Not assessed this date, will follow up as able. Patient increased lethargy this date.       AM-PAC 6 CLICK MOBILITY  Total Score:18       Treatment & Education:  Patient educated on role of PT in acute care, PT POC, and PT goals.  Patient educated on calling for assistance for any needs to improve overall safety awareness.    Patient left HOB elevated with all lines intact, call button in reach, RN notified, and family present.    GOALS:   Multidisciplinary Problems       Physical Therapy Goals          Problem: Physical Therapy    Goal Priority Disciplines Outcome Goal Variances Interventions   Physical Therapy Goal     PT, PT/OT Progressing     Description: Goals to be met by: 24     Patient will increase functional independence with mobility by performin. Supine to sit with Stand-by Assistance  2. Sit to supine with Stand-by Assistance  3. Sit to stand transfer with Stand-by Assistance  4. Bed to chair transfer with Stand-by Assistance using Rolling Walker or LRAD  5. Gait  x 100 feet with Stand-by Assistance using Rolling Walker.   6. Ascend/descend 2 stair with right or left Handrails Contact Guard Assistance using No Assistive Device.   7. Lower extremity exercise program x15 reps per handout, with assistance as needed                         History:     Past Medical History:   Diagnosis Date    Anxiety     Depression     FH: ovarian cancer 2020    Hx of psychiatric care     Effexor, Paxil, Lexapro, Zoloft, Wellbutrin, Trazodone Buspar    Hypertension     Hyperthyroidism     Hypothyroid     Kidney calculi     Malignant neoplasm of sigmoid colon 2020    Menorrhagia     Multinodular goiter 2012    Palpitation     Psychiatric problem     Venous insufficiency     Vulvar lesion        Past Surgical History:   Procedure Laterality Date     SECTION, CLASSIC      x3    COLON SURGERY      COLONOSCOPY N/A 2020    Procedure: COLONOSCOPY;  Surgeon: Shane Parker,  MD;  Location: [facility];  Service: Endoscopy;  Laterality: N/A;    COLONOSCOPY N/A 06/21/2022    Procedure: COLONOSCOPY;  Surgeon: Shane Parker MD;  Location: [facility];  Service: Endoscopy;  Laterality: N/A;    COLONOSCOPY N/A 05/09/2023    Procedure: COLONOSCOPY;  Surgeon: Shane Parker MD;  Location: [facility] ENDO;  Service: Endoscopy;  Laterality: N/A;  no cecum anastomosis    CYSTOSCOPY N/A 4/22/2024    Procedure: CYSTOSCOPY;  Surgeon: Willie Bailey Jr., MD;  Location: [facility] OR 1ST FLR;  Service: Urology;  Laterality: N/A;    CYSTOSCOPY W/ URETERAL STENT PLACEMENT Bilateral 03/25/2020    Procedure: CYSTOSCOPY, WITH URETERAL STENT INSERTION;  Surgeon: Claudio Tyson MD;  Location: [facility] OR 2ND FLR;  Service: Urology;  Laterality: Bilateral;    ESOPHAGOGASTRODUODENOSCOPY N/A 4/9/2024    Procedure: EGD (ESOPHAGOGASTRODUODENOSCOPY);  Surgeon: Shane Parker MD;  Location: [facility];  Service: Gastroenterology;  Laterality: N/A;    EXAMINATION UNDER ANESTHESIA N/A 01/16/2024    Procedure: Exam under anesthesia-vagina;  Surgeon: Eli Her MD;  Location: [facility] OR;  Service: OB/GYN;  Laterality: N/A;    EXCISION-WIDE LOCAL Left 01/16/2024    Procedure: EXCISION-WIDE LOCAL -  Labia Majora;  Surgeon: Eli Her MD;  Location: [facility] OR;  Service: OB/GYN;  Laterality: Left;  upper left side of labia majora    EXCISIONAL BIOPSY, LYMPH NODE  07/2022    abdominal x 4    INSERTION OF TUNNELED CENTRAL VENOUS CATHETER (CVC) WITH SUBCUTANEOUS PORT N/A 05/01/2020    Procedure: [id number];  Surgeon: [facility];  Location: [facility] OR 2ND FLR;  Service: Radiology;  Laterality: N/A;  Room [facility]    LAPAROSCOPIC SIGMOIDECTOMY N/A 03/25/2020    Procedure: COLECTOMY, SIGMOID, LAPAROSCOPIC, flex sig, ERAS high;  Surgeon: Silvio Man MD;  Location: [facility] OR 2ND FLR;  Service: Colon and Rectal;  Laterality: N/A;    MOBILIZATION OF SPLENIC FLEXURE  03/25/2020    Procedure:  MOBILIZATION, SPLENIC FLEXURE;  Surgeon: Silvio Man MD;  Location: [facility] OR [address] [place] [address];  Service: Colon and Rectal;;    TONSILLECTOMY      TOTAL ABDOMINAL HYSTERECTOMY W/ BILATERAL SALPINGOOPHORECTOMY N/A 03/25/2020    Procedure: HYSTERECTOMY, TOTAL, ABDOMINAL, WITH BILATERAL SALPINGO-OOPHORECTOMY;  Surgeon: Keron Brady MD;  Location: [facility] OR [address] [place] [address];  Service: Oncology;  Laterality: N/A;       Time Tracking:     PT Received On: 04/26/24  PT Start Time: 1600     PT Stop Time: 1613  PT Total Time (min): 13 min     Billable Minutes: Evaluation 13      04/26/2024

## 2024-04-26 NOTE — PROGRESS NOTES
[facility] - Transplant Stepdown  Hematology/Oncology  Progress Note    Patient Name: Gail Mckinnon  Admission Date: 4/19/2024  Hospital Length of Stay: 6 days  Code Status: Full Code     Subjective:     HPI:  54F with sigmoid cancer (patient of Dr. Pimentel, has progressed through multiple therapies and recently on single agent Debo) with evidence of disease progression and active OP plans to start fruquintinib who presented to the ED with hip pain. Vitals stable, labs show MARILOU. CT shows tumor involvement at left hip illiopsoas which may be contributing to pain. Patient admitted for pain management and MARILOU.     Interval History: A bit somnolent overnight and dyspneic this am in the setting of what appears to be volume overload. Lasix ordered. Aspiration precautions     Oncology Treatment Plan:   [No matching plan found]    Medications:  Continuous Infusions:  Current Facility-Administered Medications   Medication Dose Route Frequency Last Rate Last Admin     Scheduled Meds:  Current Facility-Administered Medications   Medication Dose Route Frequency    acetaminophen  1,000 mg Oral Q8H    aluminum-magnesium hydroxide-simethicone  30 mL Oral QID (AC & HS)    buPROPion  150 mg Oral Daily    cinacalcet  30 mg Oral Daily with breakfast    famotidine  20 mg Oral Daily    gabapentin  300 mg Oral QHS    heparin (porcine)  7,500 Units Subcutaneous Q8H    levothyroxine  350 mcg Oral Before breakfast    oxyCODONE  40 mg Oral Q12H    polyethylene glycol  17 g Oral TID    predniSONE  5 mg Oral Daily    senna-docusate 8.6-50 mg  2 tablet Oral BID    sucralfate  1 g Oral QID (AC & HS)     PRN Meds:  Current Facility-Administered Medications:     calcium carbonate, 1,000 mg, Oral, TID PRN    dextrose 10%, 12.5 g, Intravenous, PRN    dextrose 10%, 25 g, Intravenous, PRN    dicyclomine, 20 mg, Oral, BID PRN    fentaNYL, 25 mcg, Intravenous, Q5 Min PRN    glucagon (human recombinant), 1 mg, Intramuscular, PRN    glucose, 16 g,  Oral, PRN    glucose, 24 g, Oral, PRN    HYDROmorphone, 1 mg, Intravenous, Q4H PRN    lactulose, 10 g, Oral, Q6H PRN    LORazepam, 1 mg, Oral, Q12H PRN    mirtazapine, 7.5 mg, Oral, Nightly PRN    naloxone, 0.02 mg, Intravenous, PRN    ondansetron, 4 mg, Oral, Q8H PRN    ondansetron, 4 mg, Intravenous, Daily PRN    oxyCODONE, 20 mg, Oral, Q4H PRN    prochlorperazine, 10 mg, Oral, Q6H PRN    sodium chloride 0.9%, 10 mL, Intravenous, Q12H PRN     Review of Systems   Constitutional:  Positive for fatigue. Negative for chills and fever.   Respiratory:  Positive for shortness of breath.    Cardiovascular:  Positive for leg swelling.   Gastrointestinal:  Negative for nausea and vomiting.   Genitourinary:  Negative for decreased urine volume.   Musculoskeletal:  Positive for arthralgias and myalgias. Negative for gait problem.   Neurological:  Positive for weakness.   Psychiatric/Behavioral:  Negative for confusion.      Objective:     Vital Signs (Most Recent):  Temp: 97.8 °F (36.6 °C) (04/26/24 0945)  Pulse: (!) 122 (04/26/24 0945)  Resp: (!) 26 (04/26/24 0945)  BP: 123/70 (04/26/24 0945)  SpO2: 95 % (04/26/24 0945) Vital Signs (24h Range):  Temp:  [97.4 °F (36.3 °C)-98.6 °F (37 °C)] 97.8 °F (36.6 °C)  Pulse:  [104-122] 122  Resp:  [16-26] 26  SpO2:  [88 %-95 %] 95 %  BP: ()/(49-81) 123/70     Weight: 135 kg (297 lb 9.9 oz)  Body mass index is 48.04 kg/m².  Body surface area is 2.51 meters squared.      Intake/Output Summary (Last 24 hours) at 4/26/2024 1014  Last data filed at 4/26/2024 0604  Gross per 24 hour   Intake 200 ml   Output 675 ml   Net -475 ml        Physical Exam  Vitals and nursing note reviewed.   Constitutional:       General: She is not in acute distress.     Appearance: She is not toxic-appearing or diaphoretic.   HENT:      Head: Normocephalic and atraumatic.      Right Ear: External ear normal.      Left Ear: External ear normal.      Nose: Nose normal.      Mouth/Throat:      Mouth: Mucous  membranes are moist.      Pharynx: No oropharyngeal exudate.   Eyes:      General: No scleral icterus.        Right eye: No discharge.         Left eye: No discharge.      Extraocular Movements: Extraocular movements intact.      Pupils: Pupils are equal, round, and reactive to light.   Neck:      Comments: Trachea midline  Cardiovascular:      Rate and Rhythm: Normal rate and regular rhythm.      Heart sounds: No murmur heard.     Comments: JVD  Pulmonary:      Effort: Pulmonary effort is normal. No respiratory distress.      Breath sounds: No wheezing or rales.   Abdominal:      General: Abdomen is flat. Bowel sounds are normal. There is no distension.      Palpations: Abdomen is soft.      Tenderness: There is no abdominal tenderness. There is no guarding.      Comments: Bilateral nephrostomies in place dressing c/d/i   Musculoskeletal:         General: No swelling, tenderness, deformity or signs of injury. Normal range of motion.      Cervical back: Normal range of motion.      Right lower leg: Edema present.      Left lower leg: Edema present.   Lymphadenopathy:      Cervical: No cervical adenopathy.   Skin:     General: Skin is warm and dry.      Coloration: Skin is not jaundiced.      Findings: No bruising or rash.   Neurological:      General: No focal deficit present.      Mental Status: She is alert and oriented to person, place, and time.      Cranial Nerves: No cranial nerve deficit.   Psychiatric:         Behavior: Behavior normal.         Judgment: Judgment normal.          Significant Labs:   All pertinent labs from the last 24 hours have been reviewed.    Diagnostic Results:  I have reviewed all pertinent imaging results/findings within the past 24 hours.  Assessment/Plan:     * Hip pain  - CT evidence of tumor involvement at left hip illiopsoas which may be contributing to pain. Pain improving with adjustments made to regimen per palliative.  - S/p palliative radiation to L iliopsoas      Plan:  -  palliative medicine following  - increasing oxycodone to 20 mg q4h prn first line and continue hydromorphone 1 mg IVP as second line therapy  - Continue Oxycontin 40 mg BID  - prednisone 5 mg daily for inflammatory pain given muscle involvement  - gabapentin to 300 mg Daily    Acute hypoxemic respiratory failure  Patient with Hypoxic Respiratory failure which is Acute.  she is not on home oxygen. Supplemental oxygen was provided and noted-      .   Signs/symptoms of respiratory failure include- tachypnea and respiratory distress. Contributing diagnoses includes -  Volume overload  Labs and images were reviewed. Patient Has not had a recent ABG. Will treat underlying causes and adjust management of respiratory failure as follows-     Will diurese for euvolemia as well as investigate for DVT with US for now.    MARILOU (acute kidney injury)  Pt reports decreased urine output and decreased PO intake    Creatinine 2.8 on admit, baseline around 0.9 - 1.0  - Likely pre-renal from dehydration and volume losses superimposed on post renal  - continuing to worsen, Urology unable to stent      Results:  US retroperitoneal: Moderate bilateral hydronephrosis, corresponding to findings on prior CT performed 03/11/2024.       Plan:   - S/p bilateral nephrostomy placement, monitor daily CMP, 1L IVF encourage PO Intake  - Strict I&Os and daily weights   - Avoid nephrotoxic agents such as NSAIDs, gadolinium and IV radiocontrast.  - Renally dose meds to current GFR.  - Maintain MAP > 65.        Hypothyroidism  - continue home synthroid     Other constipation  Miralax TID  Senna BID    Anxiety  - home bupropion     Malignant neoplasm of sigmoid colon  Patient of Dr. Pimentel, has progressed through multiple therapies and recently on single agent Debo) with evidence of disease progression and active OP plans to start fruquintinib              Saleem Arora MD  Hematology/Oncology  [facility] - Transplant Stepdown

## 2024-04-27 PROBLEM — K56.7 ILEUS: Status: ACTIVE | Noted: 2024-01-01

## 2024-04-27 NOTE — ASSESSMENT & PLAN NOTE
Suspect uremia vs opioids vs delirium.     --CT head ordered but held due to respiratory status  --holding gabapentin and further opioids dosing for now  --delirium precautions  --neuro checks

## 2024-04-27 NOTE — NURSING
Patient to be transferred to [facility] by respirator and rapid response team. family at bedside.  This RN notified Dr. Buckley of patient status.

## 2024-04-27 NOTE — HPI
Gail Mckinnon is a 54 year old woman with HTN, depression/anxiety, thyroid problem, and sigmoid carcinoma (s/p multiple therapies) who presented on 4/19 with worsening left hip pain. Patient's recent hip imaging showed evidence of disease progression. Outpatient plan was to start fruquintinib. In ED patient's vitals were stable and labs were notable for MARILOU (Cr 2.8). Patient was admitted for pain management and MARILOU. Imaging showed tumor involvement at left hip iliopsoas. With rising creatinine, ultrasound was done that showed moderate bilateral hydronephrosis and urology was consulted. S/p attempt for bilateral ureteral stents that was unsuccessful, followed by IR-guided bilateral nephrostomy tube placements for hydronephrosis on 4/22/24. Patient s/p palliative radiation to painful left iliopsoas metastasis/left pelvis/inguinal adenopathy on 4/24. Pain improving but persistent with Palliative Medicine assisting with analgesia regimen, last regimen with: Oxycontin 40 mg PO BID, oxycodone 20 mg PO q4h PRN for severe pain and hydromorphone 1 mg IV q4h PRN for breakthrough pain    On 4/26, patient was noted to have been more somnolent and dyspneic in the morning, which was felt to be due to volume overload. CXR showed pulmonary edema. Was placed on NC. She received 3 doses of lasix on 4/26 (40 IV at 9am, 80 IV at noon, and 120 IV at 8pm) with about 700cc UOP charted through the day. She continued to be somnolent and dyspneic and VBG showed pH 7.288 pCO2 33.3. On evening of 4/26, Rapid Response was called given her SpO2 at 83% despite increase in supplemental O2 to 6L NC. Patient placed on 100% NRB with SpO2 improving to 94%. Given increase in O2 requirements, patient transferred to MICU for acute hypoxemic respiratory failure.

## 2024-04-27 NOTE — ASSESSMENT & PLAN NOTE
Palliative care team following.  HCPOA: sonStar   Daughter states patient would want to be a full code but would not want to be kept alive on life support if no signs of meaningful recovery.

## 2024-04-27 NOTE — PLAN OF CARE
MICU DAILY GOALS     Family/Goals of care/Code Status   Code Status: Full Code    24H Vital Sign Range  Temp:  [97.4 °F (36.3 °C)-99.2 °F (37.3 °C)]   Pulse:  [115-128]   Resp:  [17-38]   BP: ()/()   SpO2:  [83 %-100 %]      Shift Events (include procedures and significant events)   High flow O2 50liters and 35% titrated for O2 sat greater than 92%.  Pt remains confused. Holding on CT scan due to pt unable to tolerate lying flat.    AWAKE RASS: Goal -    Actual - RASS (Ac Agitation-Sedation Scale): restless    Restraint necessity: Not necessary   BREATHE SBT: Not intubated    Coordinate A & B, analgesics/sedatives Pain: managed   SAT: Not intubated   Delirium CAM-ICU: Overall CAM-ICU: Positive   Early(intubated/ Progressive (non-intubated) Mobility MOVE Screen (INTUBATED ONLY): Not intubated    Activity: Activity Management: Arm raise - L1   Feeding/Nutrition Diet order: Diet/Nutrition Received: NPO,     Thrombus DVT prophylaxis: VTE Required Core Measure: Pharmacological prophylaxis initiated/maintained   HOB Elevation Head of Bed (HOB) Positioning: HOB at 30 degrees   Ulcer Prophylaxis GI: yes   Glucose control managed     Skin Skin assessed during: Admit    Sacrum intact/not altered? No  Heels intact/not altered? Yes  Surgical wound? Yes    CHECK ONE!   (no altered skin or altered skin) and sub boxes:  [] No Altered Skin Integrity Present    []Prevention Measures Documented    [x] Altered Skin Integrity Present or Discovered   [x] LDA present in EPIC, daily doc completed              [] LDA added if not in EPIC (describe wound).                    When describing wound, do not stage, use descriptive words only.    [] Wound Image Taken (required on admit,                   transfer/discharge and every Tuesday)    Wound Care Consulted? Yes    4 EYES:  Attending Nurse (1st set of eyes):     Second RN/Staff Member (2nd set of eyes):    Bowel Function no issues    Indwelling Catheter Necessity          Port accessed per NP order   De-escalation Antibiotics Yes        VS and assessment per flow sheet, patient progressing towards goals as tolerated, plan of care reviewed with  and family, all concerns addressed, will continue to monitor.

## 2024-04-27 NOTE — ASSESSMENT & PLAN NOTE
Patient of Dr. Pimentel, has progressed through multiple therapies and recently on single agent Debo) with evidence of disease progression and active OP plans to start fruquintinib.    - Heme/Onc and Palliative Medicine following

## 2024-04-27 NOTE — ASSESSMENT & PLAN NOTE
Patient called today with regards to the following prescription request: Current    Provider: Carol Cerda MD  Medication: lisinopril-hydrochlorthizide  Strength: 10-12.5  Dosing Instructions:   Supply Requested:   Pharmacy:   [id number] - [place], [place] - [address]  [address] [place] [address]  Phone: [phone] Fax: [phone]        For additional information, or if you need to contact the caller at the following number:    Contact Phone Number:   Mobile [phone]       Patient states that it is okay to leave a detailed message.    Preferred time for callback: any    Patient was instructed to call pharmacy directly for future refills.    Advised Patient that refill processing may take 48-72 hours and that the pharmacy will contact them when their prescription is ready for pickup.   Pt reports decreased urine output and decreased PO intake. Creatinine 2.8 on admit, baseline around 0.9 - 1.0. US retroperitoneal: Moderate bilateral hydronephrosis, corresponding to findings on prior CT performed 03/11/2024.   Likely pre-renal from dehydration and volume losses superimposed on post-renal obstruction. Now s/p bilateral nephrostomy placement.    - Daily CMP  - Strict I&Os and daily weights   - Avoid nephrotoxic agents such as NSAIDs, gadolinium and IV radiocontrast.  - Renally dose meds to current GFR.  - Maintain MAP > 65.

## 2024-04-27 NOTE — ASSESSMENT & PLAN NOTE
Patient initially presented with severe left hip pain in setting of known sigmoid carcinoma. CT evidence of cancer involvement at left hip illiopsoas which is likely contributing to pain. Pain improving with adjustments made to regimen per palliative.  - S/p palliative radiation to L iliopsoas with Rad Onc     Plan:  - Palliative medicine following, appreciate assistance  - Previous regimen: Oxycontin 40 mg BID, oxycodone to 20 mg q4h PRN, hydromorphone 1 mg   - Holding at this time given concern for potential effects on respiratory and mental status

## 2024-04-27 NOTE — ASSESSMENT & PLAN NOTE
Pt reports decreased urine output and decreased PO intake. Creatinine 2.8 on admit, baseline around 0.9 - 1.0. US retroperitoneal: Moderate bilateral hydronephrosis, corresponding to findings on prior CT performed 03/11/2024.   Likely pre-renal from dehydration and volume losses superimposed on post-renal obstruction. Now s/p bilateral nephrostomy placement.    - Daily CMP  - Strict I&Os and daily weights   - Avoid nephrotoxic agents such as NSAIDs, gadolinium and IV radiocontrast.  - Renally dose meds to current GFR.  - Maintain MAP > 65.

## 2024-04-27 NOTE — CONSULTS
Consult received, patient to be admitted to MICU. Full H&P to follow.    Aysha Barrios MD  Internal Medicine, PGY-2  04/26/2024 9:50 PM

## 2024-04-27 NOTE — SUBJECTIVE & OBJECTIVE
Past Medical History:   Diagnosis Date    Anxiety     Depression     FH: ovarian cancer 2020    Hx of psychiatric care     Effexor, Paxil, Lexapro, Zoloft, Wellbutrin, Trazodone Buspar    Hypertension     Hyperthyroidism     Hypothyroid     Kidney calculi     Malignant neoplasm of sigmoid colon 2020    Menorrhagia     Multinodular goiter 2012    Palpitation     Psychiatric problem     Venous insufficiency     Vulvar lesion        Past Surgical History:   Procedure Laterality Date     SECTION, CLASSIC      x3    COLON SURGERY      COLONOSCOPY N/A 2020    Procedure: COLONOSCOPY;  Surgeon: Shane Parker MD;  Location: [facility];  Service: Endoscopy;  Laterality: N/A;    COLONOSCOPY N/A 2022    Procedure: COLONOSCOPY;  Surgeon: Shane Parker MD;  Location: [facility];  Service: Endoscopy;  Laterality: N/A;    COLONOSCOPY N/A 2023    Procedure: COLONOSCOPY;  Surgeon: Shane Parker MD;  Location: [facility];  Service: Endoscopy;  Laterality: N/A;  no cecum anastomosis    CYSTOSCOPY N/A 2024    Procedure: CYSTOSCOPY;  Surgeon: Willie Bailey Jr., MD;  Location: [facility] OR 1ST FLR;  Service: Urology;  Laterality: N/A;    CYSTOSCOPY W/ URETERAL STENT PLACEMENT Bilateral 2020    Procedure: CYSTOSCOPY, WITH URETERAL STENT INSERTION;  Surgeon: Claudio Tyson MD;  Location: [facility] OR 2ND FLR;  Service: Urology;  Laterality: Bilateral;    ESOPHAGOGASTRODUODENOSCOPY N/A 2024    Procedure: EGD (ESOPHAGOGASTRODUODENOSCOPY);  Surgeon: Shane Parker MD;  Location: [facility];  Service: Gastroenterology;  Laterality: N/A;    EXAMINATION UNDER ANESTHESIA N/A 2024    Procedure: Exam under anesthesia-vagina;  Surgeon: Eli Her MD;  Location: [facility] OR;  Service: OB/GYN;  Laterality: N/A;    EXCISION-WIDE LOCAL Left 2024    Procedure: EXCISION-WIDE LOCAL -  Labia Majora;  Surgeon: Eli Her MD;  Location: [facility] OR;  Service: OB/GYN;   Laterality: Left;  upper left side of labia majora    EXCISIONAL BIOPSY, LYMPH NODE  07/2022    abdominal x 4    INSERTION OF TUNNELED CENTRAL VENOUS CATHETER (CVC) WITH SUBCUTANEOUS PORT N/A 05/01/2020    Procedure: ZEAKOSMPS-PHSY-Z-CATH;  Surgeon: Stephen Diagnostic Provider;  [facility];  Service: Radiology;  Laterality: N/A;  Room 189/[facility]    LAPAROSCOPIC SIGMOIDECTOMY N/A 03/25/2020    Procedure: COLECTOMY, SIGMOID, LAPAROSCOPIC, flex sig, ERAS high;  Surgeon: Silvio Man MD;  Location: [facility] OR 2ND FLR;  Service: Colon and Rectal;  Laterality: N/A;    MOBILIZATION OF SPLENIC FLEXURE  03/25/2020    Procedure: MOBILIZATION, SPLENIC FLEXURE;  Surgeon: Silvio Man MD;  [facility];  Service: Colon and Rectal;;    TONSILLECTOMY      TOTAL ABDOMINAL HYSTERECTOMY W/ BILATERAL SALPINGOOPHORECTOMY N/A 03/25/2020    Procedure: HYSTERECTOMY, TOTAL, ABDOMINAL, WITH BILATERAL SALPINGO-OOPHORECTOMY;  Surgeon: Keron Brady MD;  [facility];  Service: Oncology;  Laterality: N/A;       Review of patient's allergies indicates:   Allergen Reactions    Oxaliplatin Other (See Comments)     Itchy hands, throat closed up, trouble hearing - during infusion    Penicillins Hives and Rash     childhood allergy         Family History       Problem Relation (Age of Onset)    Cancer Maternal Aunt, Paternal Uncle, Maternal Grandmother    Colon cancer Paternal Uncle    Diabetes Mother (65)    Drug abuse Brother, Brother, Son, Son    Heart disease Father    No Known Problems Daughter    Ovarian cancer Maternal Aunt, Maternal Aunt, Maternal Grandmother, Cousin          Tobacco Use    Smoking status: Never    Smokeless tobacco: Never   Substance and Sexual Activity    Alcohol use: Yes     Comment: occasionally- twice monthly    Drug use: Never    Sexual activity: Yes     Partners: Male     Birth control/protection: See Surgical Hx      Review of Systems   Unable to perform ROS:  Mental status change     Objective:     Vital Signs (Most Recent):  Temp: 97.9 °F (36.6 °C) (24)  Pulse: (!) 127 (24)  Resp: (!) 36 (24)  BP: (!) 103/55 (24)  SpO2: (!) 93 % (24) Vital Signs (24h Range):  Temp:  [97.4 °F (36.3 °C)-98.5 °F (36.9 °C)] 97.9 °F (36.6 °C)  Pulse:  [113-127] 127  Resp:  [18-36] 36  SpO2:  [83 %-100 %] 93 %  BP: ()/() 103/55   Weight: 135 kg (297 lb 9.9 oz)  Body mass index is 48.04 kg/m².      Intake/Output Summary (Last 24 hours) at 2024  Last data filed at 2024 1600  Gross per 24 hour   Intake 50 ml   Output 920 ml   Net -870 ml          Physical Exam  Vitals reviewed.   Constitutional:       Appearance: She is morbidly obese. She is ill-appearing.      Interventions: Nasal cannula in place.   HENT:      Head: Normocephalic and atraumatic.   Eyes:      Pupils: Pupils are equal, round, and reactive to light.   Cardiovascular:      Rate and Rhythm: Regular rhythm. Tachycardia present.      Heart sounds: Normal heart sounds.   Pulmonary:      Effort: Pulmonary effort is normal. No tachypnea or accessory muscle usage.      Breath sounds: Normal breath sounds. No wheezing or rales.   Abdominal:      General: Bowel sounds are normal.      Palpations: Abdomen is soft.      Tenderness: There is abdominal tenderness.   Genitourinary:     Comments: Bilateral nephrostomy tubes in place with clear, yellow UOP  Musculoskeletal:      Right lower le+ Edema present.      Left lower le+ Edema present.   Skin:     General: Skin is warm and dry.   Neurological:      Mental Status: She is lethargic.      GCS: GCS eye subscore is 4. GCS verbal subscore is 3. GCS motor subscore is 6.      Comments: Oriented to person only. Delayed but following commands.    Psychiatric:         Behavior: Behavior is cooperative.            Vents:  Oxygen Concentration (%): 30 (24 8078)  Lines/Drains/Airways       Central Venous  Catheter Line  Duration             Port A Cath Single Lumen Subclavian Right -- days              Drain  Duration                  Nephrostomy 04/22/24 1546 Right 10 Fr. 4 days         Nephrostomy 04/22/24 1547 Left 10 Fr. 4 days              Peripheral Intravenous Line  Duration                  Peripheral IV - Single Lumen 04/24/24 0403 22 G Anterior;Right Forearm 2 days                  Significant Labs:    CBC/Anemia Profile:  Recent Labs   Lab 04/25/24  0613 04/26/24  0554 04/26/24  2124   WBC 5.83 7.94  --    HGB 11.1* 11.3*  --    HCT 35.2* 35.0* 32*    328  --    MCV 93 93  --    RDW 28.4* 28.8*  --         Chemistries:  Recent Labs   Lab 04/25/24  0613 04/26/24  0554   * 133*   K 4.5 4.7    109   CO2 13* 13*   BUN 54* 61*   CREATININE 3.4* 3.2*   CALCIUM 9.2 9.9   ALBUMIN 2.5* 2.5*   PROT 6.1 6.4   BILITOT 0.3 0.3   ALKPHOS 107 108   ALT <5* <5*   AST 16 15   MG 2.3 2.7*   PHOS 4.0 4.2       All pertinent labs within the past 24 hours have been reviewed.    Significant Imaging: I have reviewed all pertinent imaging results/findings within the past 24 hours.

## 2024-04-27 NOTE — PLAN OF CARE
MICU DAILY GOALS     Family/Goals of care/Code Status   Code Status: Full Code    24H Vital Sign Range  Temp:  [97.8 °F (36.6 °C)-99.9 °F (37.7 °C)]   Pulse:  [107-128]   Resp:  [14-38]   BP: ()/(55-86)   SpO2:  [83 %-100 %]      Shift Events (include procedures and significant events)   No acute events throughout shift; Pt weaned from Airvo to 1 L NC; O2 sats 96-98%; denies SOB at this time; pain controlled; no gtts; VSS.    AWAKE RASS: Goal -    Actual - RASS (Ac Agitation-Sedation Scale): alert and calm    Restraint necessity: Not necessary   BREATHE SBT: Not intubated    Coordinate A & B, analgesics/sedatives Pain: managed   SAT: Not intubated   Delirium CAM-ICU: Overall CAM-ICU: Positive   Early(intubated/ Progressive (non-intubated) Mobility MOVE Screen (INTUBATED ONLY): Not intubated    Activity: Activity Management: Arm raise - L1, Rolling - L1   Feeding/Nutrition Diet order: Diet/Nutrition Received: clear liquid,     Thrombus DVT prophylaxis: VTE Required Core Measure: Pharmacological prophylaxis initiated/maintained   HOB Elevation Head of Bed (HOB) Positioning: HOB at 30-45 degrees   Ulcer Prophylaxis GI: yes   Glucose control managed     Skin Skin assessed during: Daily Assessment    Sacrum intact/not altered? Yes  Heels intact/not altered? Yes  Surgical wound? No    CHECK ONE!   (no altered skin or altered skin) and sub boxes:  [] No Altered Skin Integrity Present    []Prevention Measures Documented    [] Altered Skin Integrity Present or Discovered   [x] LDA present in EPIC, daily doc completed              [] LDA added if not in EPIC (describe wound).                    When describing wound, do not stage, use descriptive words only.    [] Wound Image Taken (required on admit,                   transfer/discharge and every Tuesday)    Wound Care Consulted? Yes    4 EYES:  Attending Nurse (1st set of eyes): Jarett    Second RN/Staff Member (2nd set of eyes):    Bowel Function no issues     Indwelling Catheter Necessity         Bilat Nephrostomy tubes   De-escalation Antibiotics No        VS and assessment per flow sheet, patient progressing towards goals as tolerated, plan of care reviewed with Gail Mckinnon/family, all concerns addressed, will continue to monitor.

## 2024-04-27 NOTE — CODE/ RAPID DOCUMENTATION
RAPID RESPONSE NURSE NOTE        Admit Date: 2024  LOS: 6  Code Status: Full Code   Date of Consult: 2024  : 1970  Age: 54 y.o.  Weight:   Wt Readings from Last 1 Encounters:   24 (!) 162.8 kg (358 lb 14.5 oz)     Sex: female  Race: White   Bed: [facility] A:   MRN: [id number]  Time Rapid Response Team page Received:   Time Rapid Response Team at Bedside:   Time Rapid Response Team left Bedside:   Was the patient discharged from an ICU this admission? No   Was the patient discharged from a PACU within last 24 hours? No   Did the patient receive conscious sedation/general anesthesia in last 24 hours? No  Was the patient in the ED within the past 24 hours? No  Was the patient on NIPPV within the past 24 hours? No   Did this progress into an ARC or CPA: No  Attending Physician: Scott Kidd MD  Primary Service: [facility] MEDICAL ONCOLOGY       SITUATION    Notified by charge RN via phone call.  Reason for alert: AMS and increased oxygen requirements  Called to evaluate the patient for Respiratory    BACKGROUND     Why is the patient in the hospital?: Acute hypoxemic respiratory failure    Patient has a past medical history of Anxiety, Depression, FH: ovarian cancer, psychiatric care, Hypertension, Hyperthyroidism, Hypothyroid, Kidney calculi, Malignant neoplasm of sigmoid colon, Menorrhagia, Multinodular goiter, Palpitation, Psychiatric problem, Venous insufficiency, and Vulvar lesion.    Last Vitals:  Temp: 97.8 °F (36.6 °C) (2200)  Pulse: 123 (2315)  Resp: 23 (2315)  BP: 105/71 (2300)  SpO2: 95 % (2315)    24 Hours Vitals Range:  Temp:  [97.4 °F (36.3 °C)-98.5 °F (36.9 °C)]   Pulse:  [114-128]   Resp:  [18-36]   BP: ()/()   SpO2:  [83 %-100 %]     Labs:  Recent Labs     24  0613 24  0554 24   WBC 5.83 7.94  --  9.48   HGB 11.1* 11.3*  --  7.3*   HCT 35.2* 35.0* 32* 23.2*    328  --  374       Recent  Labs     04/25/24  0613 04/26/24  0554 04/26/24  2218   * 133* 136   K 4.5 4.7 4.6    109 107   CO2 13* 13* 16*   BUN 54* 61* 65*   CREATININE 3.4* 3.2* 3.1*   GLU 95 107 110   PHOS 4.0 4.2 4.3   MG 2.3 2.7* 2.9*        Recent Labs     04/26/24  1358 04/26/24  2124   PH 7.288* 7.339*   PCO2 33.3* 26.9*   PO2 29* 73*   HCO3 16.0* 14.5*   POCSATURATED 48 94   BE -11* -11*        ASSESSMENT    Physical Exam  Vitals and nursing note reviewed.   Constitutional:       Appearance: She is ill-appearing and toxic-appearing.      Comments: NRB at 15L   Cardiovascular:      Rate and Rhythm: Tachycardia present.      Pulses:           Radial pulses are 2+ on the right side and 2+ on the left side.   Abdominal:      General: There is distension.      Tenderness: There is abdominal tenderness.      Comments: Bilateral nephrostomy tubes with clear yellow output   Musculoskeletal:         General: Swelling present.      Right lower leg: Edema present.      Left lower leg: Edema present.   Skin:     General: Skin is warm.      Coloration: Skin is pale.      Findings: Bruising present.   Neurological:      Mental Status: She is lethargic.       Called for Rapid Response for increased AMS and increased oxygen demands since start of shift (2L-->6LNC).     Upon arrival patient lying in bed with NRB in place at 15L, SpO2 increased from 88% to 96%.   Patient lethargic, does wake to stimuli but falls back asleep quickly. Unable to fully participate in assessment and answer questions adequately. BP now trending softer.     CXR and ABG obtained. CCM notified and arrived bedside. Airvo placed on patient at 40L at 30% per TOD Sanz. SpO2 mid-90's.    Decision made for patient to upgrade to ICU. Patient transferred by [email or web address], RNx1 to [id number].    Report given to LAVINIA Schmitz (MICU)    Family updated by TOD Sanz at bedside.    INTERVENTIONS    The patient was seen for Respiratory problem. Staff concerns included  oxygen saturation < 90% despite supplemental oxygen and increased oxygen requirements. The following interventions were performed: supplemental oxygen, POCT arterial blood gas , portable chest x-ray, continuous pulse ox monitoring continued, continuous cardiac monitoring continued, and critical care consult, serum labs, POCT blood glucose.    RECOMMENDATIONS    We recommend:   -Strict I/O  -Maintain IV access  -Maintain telemetry monitoring    -Continue plan of care per MICU team.    PROVIDER ESCALATION    Orders received and case discussed with  WILLARD Sanz.    Primary team arrival time: N/A; CCM: 2130    Disposition: Tx in ICU bed 6087.    FOLLOW UP    Charge Chrystal KATE  updated on plan of care. Instructed to call the Rapid Response Nurse, Ludivina Gipson RN at [phone] for additional questions or concerns.

## 2024-04-27 NOTE — ASSESSMENT & PLAN NOTE
Suspect uremia vs opioids vs delirium.     --CT head ordered but held due to respiratory status  --holding gabapentin and further opioids dosing for now  --delirium precautions  --neuro checks  --appears to be improving

## 2024-04-27 NOTE — ASSESSMENT & PLAN NOTE
KUB 4/26 with concerns for ileus. Decreased bowel sounds. Last recorded BM 4/25.   --may need NG tube for decompression  --holding most PO medications  --bowel rest

## 2024-04-27 NOTE — NURSING
Notified RRT of oxygen needs increase to 6L nc. Oxygen sats decreased to 83% on 6L NC. Rapid called. Patient placed on 100% NRB oxygen saturation increased to 94%. Offical rapid called

## 2024-04-27 NOTE — PLAN OF CARE
"Patient remained free from injury today.  Patient noted to be confused this am.  Patient noted the president as "Obama".  Patient knew place and person but not situation.  Patient assisted to restroom and noted small BM and small amount of urine.  Upon returning to bed noted patient with increased SOB.  SaO2 = 89%.  Patient placed on 4l NC with sats improving to 91-93%.  While patient taking medications, patient began to choke and stated "went down the wrong way".  Dr. Luong and team notified and to bedside to examine patient.  Received order for chest xray, lasix, and to be NPO.  Order also received for venous blood gas and noted PH 7.28.  Team notified.  > 700cc collected out of both neph tubes.  Flush bother tubes and dressings changed.  Placed foam dressings between skin and stopcocks.  Attempted to turn patient and use wedge for positioning.  Secondary to fluid overload, difficult to position patient comfortably and keep upright for SOB.  Placed order for specialty.  Patient with much family in and out today and patient with delayed responses but conversing with some moments of clarity.  Noted US of BLE negative for DVT.  Creatinine trended down to 3.2.  Patient reported 10/10 pain this am, and ehen asked by the doctor 10 min later reported 7/10 with no intervention.  Noted mild excoriation to buttocks with no open areas.  Patient noted with BP drop to 's/50-70's this pm. HR = 123.  Notified Dr. Bartlett and Fannie.  New orders received for labs and ECHO and patient to have CT of the head.  Patient's mother and daughter at the bedside and tearful.  Patient family realistic of patient's prognosis, just verbalizing some disbelief secondary to her mental status and clinical decline from previous day.  "

## 2024-04-27 NOTE — H&P
[facility]  Critical Care Medicine  History & Physical    Patient Name: Gail Mckinnon  MRN: [id number]  Admission Date: 4/19/2024  Hospital Length of Stay: 6 days  Code Status: Full Code  Attending Physician: Scott Kidd MD   Primary Care Provider: Marah Santo MD   Principal Problem: Acute hypoxemic respiratory failure    Subjective:     HPI:  Gail Mckinnon is a 54 year old woman with HTN, depression/anxiety, thyroid problem, and sigmoid carcinoma (s/p multiple therapies) who presented on 4/19 with worsening left hip pain. Patient's recent hip imaging showed evidence of disease progression. Outpatient plan was to start fruquintinib. In ED patient's vitals were stable and labs were notable for MARILOU (Cr 2.8). Patient was admitted for pain management and MARILOU. Imaging showed tumor involvement at left hip iliopsoas. With rising creatinine, ultrasound was done that showed moderate bilateral hydronephrosis and urology was consulted. S/p attempt for bilateral ureteral stents that was unsuccessful, followed by IR-guided bilateral nephrostomy tube placements for hydronephrosis on 4/22/24. Patient s/p palliative radiation to painful left iliopsoas metastasis/left pelvis/inguinal adenopathy on 4/24. Pain improving but persistent with Palliative Medicine assisting with analgesia regimen, last regimen with: Oxycontin 40 mg PO BID, oxycodone 20 mg PO q4h PRN for severe pain and hydromorphone 1 mg IV q4h PRN for breakthrough pain    On 4/26, patient was noted to have been more somnolent and dyspneic in the morning, which was felt to be due to volume overload. CXR showed pulmonary edema. Was placed on NC. She received 3 doses of lasix on 4/26 (40 IV at 9am, 80 IV at noon, and 120 IV at 8pm) with about 700cc UOP charted through the day. She continued to be somnolent and dyspneic and VBG showed pH 7.288 pCO2 33.3. On evening of 4/26, Rapid Response was called given her SpO2 at 83% despite  increase in supplemental O2 to 6L NC. Patient placed on 100% NRB with SpO2 improving to 94%. Given increase in O2 requirements, patient transferred to MICU for acute hypoxemic respiratory failure.    Hospital/ICU Course:  No notes on file     Past Medical History:   Diagnosis Date    Anxiety     Depression     FH: ovarian cancer 2020    Hx of psychiatric care     Effexor, Paxil, Lexapro, Zoloft, Wellbutrin, Trazodone Buspar    Hypertension     Hyperthyroidism     Hypothyroid     Kidney calculi     Malignant neoplasm of sigmoid colon 2020    Menorrhagia     Multinodular goiter 2012    Palpitation     Psychiatric problem     Venous insufficiency     Vulvar lesion        Past Surgical History:   Procedure Laterality Date     SECTION, CLASSIC      x3    COLON SURGERY      COLONOSCOPY N/A 2020    Procedure: COLONOSCOPY;  Surgeon: Shane Parker MD;  Location: [facility];  Service: Endoscopy;  Laterality: N/A;    COLONOSCOPY N/A 2022    Procedure: COLONOSCOPY;  Surgeon: Shane Parker MD;  Location: [facility];  Service: Endoscopy;  Laterality: N/A;    COLONOSCOPY N/A 2023    Procedure: COLONOSCOPY;  Surgeon: Shane Parker MD;  Location: [facility];  Service: Endoscopy;  Laterality: N/A;  no cecum anastomosis    CYSTOSCOPY N/A 2024    Procedure: CYSTOSCOPY;  Surgeon: Willie Bailey Jr., MD;  Location: [facility] OR [address] [place] [address];  Service: Urology;  Laterality: N/A;    CYSTOSCOPY W/ URETERAL STENT PLACEMENT Bilateral 2020    Procedure: CYSTOSCOPY, WITH URETERAL STENT INSERTION;  Surgeon: Claudio Tyson MD;  Location: [facility] OR [address] [place] [address];  Service: Urology;  Laterality: Bilateral;    ESOPHAGOGASTRODUODENOSCOPY N/A 2024    Procedure: EGD (ESOPHAGOGASTRODUODENOSCOPY);  Surgeon: Shane Parker MD;  Location: [facility];  Service: Gastroenterology;  Laterality: N/A;    EXAMINATION UNDER ANESTHESIA N/A 2024    Procedure: Exam under anesthesia-vagina;  Surgeon:  Eli Her MD;  Location: [facility] OR;  Service: OB/GYN;  Laterality: N/A;    EXCISION-WIDE LOCAL Left 01/16/2024    Procedure: EXCISION-WIDE LOCAL -  Labia Majora;  Surgeon: Eli Her MD;  [facility];  Service: OB/GYN;  Laterality: Left;  upper left side of labia majora    EXCISIONAL BIOPSY, LYMPH NODE  07/2022    abdominal x 4    INSERTION OF TUNNELED CENTRAL VENOUS CATHETER (CVC) WITH SUBCUTANEOUS PORT N/A 05/01/2020    Procedure: [id number]-[id number];  Surgeon: [facility];  [facility];  Service: Radiology;  Laterality: N/A;  Room 189/Cindy    LAPAROSCOPIC SIGMOIDECTOMY N/A 03/25/2020    Procedure: COLECTOMY, SIGMOID, LAPAROSCOPIC, flex sig, ERAS high;  Surgeon: Silvio Man MD;  Location: [facility] OR [facility];  Service: Colon and Rectal;  Laterality: N/A;    MOBILIZATION OF SPLENIC FLEXURE  03/25/2020    Procedure: MOBILIZATION, SPLENIC FLEXURE;  Surgeon: Silvio Man MD;  [facility];  Service: Colon and Rectal;;    TONSILLECTOMY      TOTAL ABDOMINAL HYSTERECTOMY W/ BILATERAL SALPINGOOPHORECTOMY N/A 03/25/2020    Procedure: HYSTERECTOMY, TOTAL, ABDOMINAL, WITH BILATERAL SALPINGO-OOPHORECTOMY;  Surgeon: Keron Brady MD;  [facility];  Service: Oncology;  Laterality: N/A;       Review of patient's allergies indicates:   Allergen Reactions    Oxaliplatin Other (See Comments)     Itchy hands, throat closed up, trouble hearing - during infusion    Penicillins Hives and Rash     childhood allergy         Family History       Problem Relation (Age of Onset)    Cancer Maternal Aunt, Paternal Uncle, Maternal Grandmother    Colon cancer Paternal Uncle    Diabetes Mother (65)    Drug abuse Brother, Brother, Son, Son    Heart disease Father    No Known Problems Daughter    Ovarian cancer Maternal Aunt, Maternal Aunt, Maternal Grandmother, Cousin          Tobacco Use    Smoking status: Never    Smokeless tobacco: Never   Substance  and Sexual Activity    Alcohol use: Yes     Comment: occasionally- twice monthly    Drug use: Never    Sexual activity: Yes     Partners: Male     Birth control/protection: See Surgical Hx      Review of Systems   Unable to perform ROS: Mental status change     Objective:     Vital Signs (Most Recent):  Temp: 97.9 °F (36.6 °C) (24)  Pulse: (!) 127 (24)  Resp: (!) 36 (24)  BP: (!) 103/55 (24)  SpO2: (!) 93 % (24) Vital Signs (24h Range):  Temp:  [97.4 °F (36.3 °C)-98.5 °F (36.9 °C)] 97.9 °F (36.6 °C)  Pulse:  [113-127] 127  Resp:  [18-36] 36  SpO2:  [83 %-100 %] 93 %  BP: ()/() 103/55   Weight: 135 kg (297 lb 9.9 oz)  Body mass index is 48.04 kg/m².      Intake/Output Summary (Last 24 hours) at 2024  Last data filed at 2024 1600  Gross per 24 hour   Intake 50 ml   Output 920 ml   Net -870 ml          Physical Exam  Vitals reviewed.   Constitutional:       Appearance: She is morbidly obese. She is ill-appearing.      Interventions: Nasal cannula in place.   HENT:      Head: Normocephalic and atraumatic.   Eyes:      Pupils: Pupils are equal, round, and reactive to light.   Cardiovascular:      Rate and Rhythm: Regular rhythm. Tachycardia present.      Heart sounds: Normal heart sounds.   Pulmonary:      Effort: Pulmonary effort is normal. No tachypnea or accessory muscle usage.      Breath sounds: Normal breath sounds. No wheezing or rales.   Abdominal:      General: Bowel sounds are normal.      Palpations: Abdomen is soft.      Tenderness: There is abdominal tenderness.   Genitourinary:     Comments: Bilateral nephrostomy tubes in place with clear, yellow UOP  Musculoskeletal:      Right lower le+ Edema present.      Left lower le+ Edema present.   Skin:     General: Skin is warm and dry.   Neurological:      Mental Status: She is lethargic.      GCS: GCS eye subscore is 4. GCS verbal subscore is 3. GCS motor subscore is 6.       Comments: Oriented to person only. Delayed but following commands.    Psychiatric:         Behavior: Behavior is cooperative.            Vents:  Oxygen Concentration (%): 30 (04/26/24 2138)  Lines/Drains/Airways       Central Venous Catheter Line  Duration             Port A Cath Single Lumen Subclavian Right -- days              Drain  Duration                  Nephrostomy 04/22/24 1546 Right 10 Fr. 4 days         Nephrostomy 04/22/24 1547 Left 10 Fr. 4 days              Peripheral Intravenous Line  Duration                  Peripheral IV - Single Lumen 04/24/24 0403 22 G Anterior;Right Forearm 2 days                  Significant Labs:    CBC/Anemia Profile:  Recent Labs   Lab 04/25/24  0613 04/26/24  0554 04/26/24 2124   WBC 5.83 7.94  --    HGB 11.1* 11.3*  --    HCT 35.2* 35.0* 32*    328  --    MCV 93 93  --    RDW 28.4* 28.8*  --         Chemistries:  Recent Labs   Lab 04/25/24  0613 04/26/24  0554   * 133*   K 4.5 4.7    109   CO2 13* 13*   BUN 54* 61*   CREATININE 3.4* 3.2*   CALCIUM 9.2 9.9   ALBUMIN 2.5* 2.5*   PROT 6.1 6.4   BILITOT 0.3 0.3   ALKPHOS 107 108   ALT <5* <5*   AST 16 15   MG 2.3 2.7*   PHOS 4.0 4.2       All pertinent labs within the past 24 hours have been reviewed.    Significant Imaging: I have reviewed all pertinent imaging results/findings within the past 24 hours.  Assessment/Plan:     Neuro  Acute metabolic encephalopathy  Suspect uremia vs opioids vs delirium.     --CT head ordered but held due to respiratory status  --holding gabapentin and further opioids dosing for now  --delirium precautions  --neuro checks    Pulmonary  * Acute hypoxemic respiratory failure  Patient with Hypoxic Respiratory failure which is Acute.  she is not on home oxygen. Supplemental oxygen was provided and noted- Oxygen Concentration (%):  [] 30  Signs/symptoms of respiratory failure include- increased work of breathing, respiratory distress, and hypoxemia . Contributing diagnoses  includes -  Pulmonary edema and volume overload  Labs and images were reviewed. Patient Has recent ABG, which has been reviewed. Will treat underlying causes and adjust management of respiratory failure as follows:    Patient with rapid increase in supplemental O2 requirements on 4/26 with somnolence and increased dyspnea. CXR with signs of pulmonary edema. Despite normal BNP, patient appearing hypervolemic on exam.     Plan:  - Continue IV Lasix; received 120mg earlier in the evening.  - Continuous pulse oximetry  - Supplemental O2 as needed to maintain O2 sats > 94%; if continuing to have increased O2 demands, may need to place on BiPAP overnight.  - Limit use of sedating medications as tolerated  - Strict I/O's  - ABG/VBG as needed to assess acid-base status; most recent gas with pH 7.339, pCO2 26.9.    Renal/  Hydronephrosis, bilateral  S/p bilateral nephrostomy tubes per IR on 4/22. See MARILOU    MARILOU (acute kidney injury)  Pt reports decreased urine output and decreased PO intake. Creatinine 2.8 on admit, baseline around 0.9 - 1.0. US retroperitoneal: Moderate bilateral hydronephrosis, corresponding to findings on prior CT performed 03/11/2024.   Likely pre-renal from dehydration and volume losses superimposed on post-renal obstruction. Now s/p bilateral nephrostomy placement.    - Daily CMP  - Strict I&Os and daily weights   - Avoid nephrotoxic agents such as NSAIDs, gadolinium and IV radiocontrast.  - Renally dose meds to current GFR.  - Maintain MAP > 65.    Oncology  Malignant neoplasm of sigmoid colon  Patient of Dr. Pimentel, has progressed through multiple therapies and recently on single agent Debo) with evidence of disease progression and active OP plans to start fruquintinib.    - Heme/Onc and Palliative Medicine following    Endocrine  Hypothyroidism  Continue levothyroxine.     GI  Other constipation  Bowel regimen ordered    Orthopedic  Hip pain  Patient initially presented with severe left hip pain in  setting of known sigmoid carcinoma. CT evidence of cancer involvement at left hip illiopsoas which is likely contributing to pain. Pain improving with adjustments made to regimen per palliative.  - S/p palliative radiation to L iliopsoas with Rad Onc     Plan:  - Palliative medicine following, appreciate assistance  - Previous regimen: Oxycontin 40 mg BID, oxycodone to 20 mg q4h PRN, hydromorphone 1 mg   - Holding at this time given concern for potential effects on respiratory and mental status    Palliative Care  Palliative care encounter  Palliative care team following.  HCPOA: son, Star Mckinnon   Daughter states patient would want to be a full code but would not want to be kept alive on life support if no signs of meaningful recovery.      Discussed with PCCM fellow, Dr. Davidson. Further recommendations per attestation by Dr. Kidd. Daughter updated at the bedside.     Critical Care Time: 60 minutes  Critical secondary to Patient has a condition that poses threat to life and bodily function: respiratory failure on HFNC and acute metabolic encephalopathy    Critical care was time spent personally by me on the following activities: development of treatment plan with patient or surrogate and bedside caregivers, discussions with consultants, evaluation of patient's response to treatment, examination of patient, ordering and performing treatments and interventions, ordering and review of laboratory studies, ordering and review of radiographic studies, pulse oximetry, re-evaluation of patient's condition. This critical care time did not overlap with that of any other provider or involve time for any procedures.     Talia Sanz PA-C  Critical Care Medicine  [facility] - [facility]

## 2024-04-27 NOTE — NURSING
Rapid called for increasing demands in oxygenation and fluid volume overload. At the beginning of the shift, pt was on 2L, unable to maintain sats >90% with increasing oxygen up to 4L and then to 6L. Pt was ultimately placed on NRB sats up to 100%. MD on call for med onc notified. Respiratory and ICU at the bedside. ABGs and CXR done. Pt has remained tachypneic RR 20-24, tachycardic 's. She has had several doses of lasix today, last one 120mg at 2000, some but minimal output through neph tubes after administration. Pt has had worsening mentation throughout the day, outstanding CT of the head due to decline in resp status. Pt able to state her name and birthday, able to identify family in the room and aware that she is in a hospital and the year is 2024. But her level of alertness has declined despite d/c'ing narcotics today. Pt transferred by Rapid response to [facility] for higher level of care. Nurse escorted family with belongings to the waiting room.

## 2024-04-27 NOTE — ASSESSMENT & PLAN NOTE
Patient with Hypoxic Respiratory failure which is Acute.  she is not on home oxygen. Supplemental oxygen was provided and noted- Oxygen Concentration (%):  [] 30  Signs/symptoms of respiratory failure include- increased work of breathing, respiratory distress, and hypoxemia . Contributing diagnoses includes -  Pulmonary edema and volume overload  Labs and images were reviewed. Patient Has recent ABG, which has been reviewed. Will treat underlying causes and adjust management of respiratory failure as follows:    Patient with rapid increase in supplemental O2 requirements on 4/26 with somnolence and increased dyspnea. CXR with signs of pulmonary edema. Despite normal BNP, patient appearing hypervolemic on exam.     Plan:  - Continue IV Lasix; received 120mg earlier in the evening.  - Continuous pulse oximetry  - Supplemental O2 as needed to maintain O2 sats > 94%; if continuing to have increased O2 demands, may need to place on BiPAP overnight.  - Limit use of sedating medications as tolerated  - Strict I/O's  - ABG/VBG as needed to assess acid-base status; most recent gas with pH 7.339, pCO2 26.9.

## 2024-04-27 NOTE — SUBJECTIVE & OBJECTIVE
Interval History/Significant Events: Mentation improving this morning. Remains on HFNC at 50L, 30%.     Review of Systems   Respiratory:  Positive for shortness of breath. Negative for cough.    Gastrointestinal:  Positive for abdominal pain. Negative for abdominal distention.     Objective:     Vital Signs (Most Recent):  Temp: 99.2 °F (37.3 °C) (24 0330)  Pulse: (!) 117 (24 0630)  Resp: 19 (2430)  BP: 121/63 (24 0600)  SpO2: 96 % (24) Vital Signs (24h Range):  Temp:  [97.4 °F (36.3 °C)-99.2 °F (37.3 °C)] 99.2 °F (37.3 °C)  Pulse:  [115-128] 117  Resp:  [14-38] 19  SpO2:  [83 %-100 %] 96 %  BP: ()/() 121/63   Weight: (!) 162 kg (357 lb 2.3 oz)  Body mass index is 57.64 kg/m².      Intake/Output Summary (Last 24 hours) at 2024 0650  Last data filed at 2024 0508  Gross per 24 hour   Intake --   Output 1025 ml   Net -1025 ml          Physical Exam  Vitals reviewed.   Constitutional:       Appearance: She is morbidly obese. She is ill-appearing.      Interventions: Nasal cannula in place.   HENT:      Head: Normocephalic and atraumatic.   Eyes:      Pupils: Pupils are equal, round, and reactive to light.   Cardiovascular:      Rate and Rhythm: Regular rhythm. Tachycardia present.      Heart sounds: Normal heart sounds.   Pulmonary:      Effort: Accessory muscle usage present. No tachypnea.      Breath sounds: Examination of the right-lower field reveals rales. Examination of the left-lower field reveals rales. Rales present. No wheezing.   Abdominal:      General: Abdomen is protuberant. Bowel sounds are decreased. There is distension.      Tenderness: There is abdominal tenderness.   Genitourinary:     Comments: Bilateral nephrostomy tubes in place with clear, yellow UOP  Musculoskeletal:      Right lower le+ Edema present.      Left lower le+ Edema present.   Skin:     General: Skin is warm and dry.   Neurological:      Mental Status: She is alert  and oriented to person, place, and time.      GCS: GCS eye subscore is 4. GCS verbal subscore is 5. GCS motor subscore is 6.   Psychiatric:         Behavior: Behavior is cooperative.            Vents:  Oxygen Concentration (%): 35 (04/27/24 0600)  Lines/Drains/Airways       Central Venous Catheter Line  Duration             Port A Cath Single Lumen Subclavian Right -- days              Drain  Duration                  Nephrostomy 04/22/24 1546 Right 10 Fr. 4 days         Nephrostomy 04/22/24 1547 Left 10 Fr. 4 days              Peripheral Intravenous Line  Duration                  Peripheral IV - Single Lumen 04/24/24 0403 22 G Anterior;Right Forearm 3 days         Peripheral IV - Single Lumen 04/26/24 2241 20 G Right Antecubital <1 day                  Significant Labs:    CBC/Anemia Profile:  Recent Labs   Lab 04/26/24 2218 04/26/24  2314 04/27/24  0419   WBC 9.48 7.54 7.36   HGB 7.3* 10.9* 10.4*   HCT 23.2* 33.6* 32.3*    309 267   MCV 93 92 92   RDW 28.3* 28.6* 28.5*        Chemistries:  Recent Labs   Lab 04/26/24  0554 04/26/24 2218 04/27/24  0419   * 136 135*   K 4.7 4.6 4.6    107 108   CO2 13* 16* 13*   BUN 61* 65* 67*   CREATININE 3.2* 3.1* 3.1*   CALCIUM 9.9 10.4 10.2   ALBUMIN 2.5* 2.5* 2.3*   PROT 6.4 6.8 6.3   BILITOT 0.3 0.5 0.5   ALKPHOS 108 105 101   ALT <5* <5* <5*   AST 15 15 17   MG 2.7* 2.9* 2.9*   PHOS 4.2 4.3 4.3       All pertinent labs within the past 24 hours have been reviewed.    Significant Imaging:  I have reviewed all pertinent imaging results/findings within the past 24 hours.

## 2024-04-27 NOTE — ASSESSMENT & PLAN NOTE
Patient with Hypoxic Respiratory failure which is Acute.  she is not on home oxygen. Supplemental oxygen was provided and noted- Oxygen Concentration (%):  [] 35  Signs/symptoms of respiratory failure include- increased work of breathing, respiratory distress, and hypoxemia . Contributing diagnoses includes -  Pulmonary edema and volume overload  Labs and images were reviewed. Patient Has recent ABG, which has been reviewed. Will treat underlying causes and adjust management of respiratory failure as follows:    Patient with rapid increase in supplemental O2 requirements on 4/26 with somnolence and increased dyspnea. CXR with signs of pulmonary edema. Despite normal BNP, patient appearing hypervolemic on exam.     Plan:  - Continue IV Lasix; received 120mg earlier in the evening.  - Continuous pulse oximetry  - HFNC: Supplemental O2 as needed to maintain O2 sats > 94%; if continuing to have increased O2 demands, may need to place on BiPAP overnight.  - Limit use of sedating medications as tolerated  - Strict I/O's  - ABG/VBG as needed to assess acid-base status; most recent gas with pH 7.339, pCO2 26.9.

## 2024-04-28 NOTE — SUBJECTIVE & OBJECTIVE
Interval History/Significant Events:  No acute events noted overnight.  On ambient air this morning.  Some confusion after taking pain medications but better now.  Having bowel movement this morning.  Discussed with patient and daughter at the bedside.    Review of Systems   Respiratory:  Negative for shortness of breath.    Cardiovascular:  Negative for chest pain.   All other systems reviewed and are negative.    Objective:     Vital Signs (Most Recent):  Temp: 97.7 °F (36.5 °C) (04/28/24 0400)  Pulse: (!) 114 (04/28/24 0701)  Resp: 20 (04/28/24 0701)  BP: 118/67 (04/28/24 0701)  SpO2: 96 % (04/28/24 0701) Vital Signs (24h Range):  Temp:  [97.7 °F (36.5 °C)-99.9 °F (37.7 °C)] 97.7 °F (36.5 °C)  Pulse:  [107-122] 114  Resp:  [15-25] 20  SpO2:  [92 %-99 %] 96 %  BP: (100-144)/(56-86) 118/67   Weight: (!) 162 kg (357 lb 2.3 oz)  Body mass index is 57.64 kg/m².      Intake/Output Summary (Last 24 hours) at 4/28/2024 0731  Last data filed at 4/28/2024 0600  Gross per 24 hour   Intake 680 ml   Output 1350 ml   Net -670 ml          Physical Exam  Vitals and nursing note reviewed.   Constitutional:       General: She is not in acute distress.     Appearance: Normal appearance. She is normal weight. She is not ill-appearing, toxic-appearing or diaphoretic.   HENT:      Head: Normocephalic and atraumatic.      Right Ear: External ear normal.      Left Ear: External ear normal.      Nose: Nose normal.   Cardiovascular:      Rate and Rhythm: Regular rhythm. Tachycardia present.      Pulses: Normal pulses.      Heart sounds: Normal heart sounds. No murmur heard.     No friction rub. No gallop.      Comments: No JVD or peripheral edema  Pulmonary:      Effort: Pulmonary effort is normal. No respiratory distress.      Breath sounds: Normal breath sounds. No wheezing, rhonchi or rales.   Abdominal:      General: Abdomen is flat. Bowel sounds are normal. There is no distension.      Palpations: Abdomen is soft.      Tenderness:  There is no abdominal tenderness. There is no guarding or rebound.   Musculoskeletal:         General: No swelling or tenderness. Normal range of motion.   Skin:     General: Skin is warm and dry.      Coloration: Skin is not jaundiced or pale.   Neurological:      General: No focal deficit present.      Mental Status: She is alert. Mental status is at baseline.   Psychiatric:         Mood and Affect: Mood normal.         Behavior: Behavior normal.         Thought Content: Thought content normal.         Judgment: Judgment normal.            Vents:  Oxygen Concentration (%): 35 (04/27/24 1200)  Lines/Drains/Airways       Central Venous Catheter Line  Duration             Port A Cath Single Lumen Subclavian Right -- days              Drain  Duration                  Nephrostomy 04/22/24 1546 Right 10 Fr. 5 days         Nephrostomy 04/22/24 1547 Left 10 Fr. 5 days              Peripheral Intravenous Line  Duration                  Peripheral IV - Single Lumen 04/24/24 0403 22 G Anterior;Right Forearm 4 days         Peripheral IV - Single Lumen 04/26/24 2241 20 G Right Antecubital 1 day                  Significant Labs:    CBC/Anemia Profile:  Recent Labs   Lab 04/26/24  2314 04/27/24  0419 04/28/24  0250   WBC 7.54 7.36 5.99   HGB 10.9* 10.4* 9.8*   HCT 33.6* 32.3* 30.3*    267 232   MCV 92 92 93   RDW 28.6* 28.5* 28.2*        Chemistries:  Recent Labs   Lab 04/26/24  2218 04/27/24  0419 04/28/24  0250    135* 137   K 4.6 4.6 4.3    108 111*   CO2 16* 13* 16*   BUN 65* 67* 67*   CREATININE 3.1* 3.1* 2.3*   CALCIUM 10.4 10.2 10.5   ALBUMIN 2.5* 2.3* 2.2*   PROT 6.8 6.3 6.3   BILITOT 0.5 0.5 0.6   ALKPHOS 105 101 103   ALT <5* <5* 10   AST 15 17 22   MG 2.9* 2.9* 2.7*   PHOS 4.3 4.3 3.5       All pertinent labs within the past 24 hours have been reviewed.    Significant Imaging:  I have reviewed all pertinent imaging results/findings within the past 24 hours.

## 2024-04-28 NOTE — PLAN OF CARE
MICU DAILY GOALS     Family/Goals of care/Code Status   Code Status: Full Code    24H Vital Sign Range  Temp:  [97.7 °F (36.5 °C)-99.9 °F (37.7 °C)]   Pulse:  [107-122]   Resp:  [14-23]   BP: (100-144)/(56-86)   SpO2:  [92 %-99 %]      Shift Events (include procedures and significant events)   No acute events throughout shift    AWAKE RASS: Goal -    Actual - RASS (Ac Agitation-Sedation Scale): drowsy    Restraint necessity: Not necessary   BREATHE SBT: Not intubated    Coordinate A & B, analgesics/sedatives Pain: managed   SAT: Not intubated   Delirium CAM-ICU: Overall CAM-ICU: Positive   Early(intubated/ Progressive (non-intubated) Mobility MOVE Screen (INTUBATED ONLY): Not intubated    Activity: Activity Management: Arm raise - L1, Ankle pumps - L1   Feeding/Nutrition Diet order: Diet/Nutrition Received: clear liquid,     Thrombus DVT prophylaxis: VTE Required Core Measure: Pharmacological prophylaxis initiated/maintained   HOB Elevation Head of Bed (HOB) Positioning: HOB at 30 degrees   Ulcer Prophylaxis GI: yes   Glucose control managed     Skin Skin assessed during: Q Shift Change    Sacrum intact/not altered? Yes  Heels intact/not altered? Yes  Surgical wound? No    CHECK ONE!   (no altered skin or altered skin) and sub boxes:  [x] No Altered Skin Integrity Present    [x]Prevention Measures Documented    [] Altered Skin Integrity Present or Discovered   [] LDA present in EPIC, daily doc completed              [] LDA added if not in EPIC (describe wound).                    When describing wound, do not stage, use descriptive words only.    [] Wound Image Taken (required on admit,                   transfer/discharge and every Tuesday)    Wound Care Consulted? No    4 EYES:  Attending Nurse (1st set of eyes):     Second RN/Staff Member (2nd set of eyes):    Bowel Function constipation    Indwelling Catheter Necessity            De-escalation Antibiotics         VS and assessment per flow sheet, patient  progressing towards goals as tolerated, plan of care reviewed with [unfilled] and family, all concerns addressed, will continue to monitor.

## 2024-04-28 NOTE — ASSESSMENT & PLAN NOTE
Resolved    Suspect uremia vs opioids vs delirium.     --CT head ordered but held due to respiratory status  --holding gabapentin and further opioids dosing for now  --delirium precautions  --neuro checks  --appears to be improving

## 2024-04-28 NOTE — ASSESSMENT & PLAN NOTE
Resolved    Patient with Hypoxic Respiratory failure which is Acute.  she is not on home oxygen. Supplemental oxygen was provided and noted- Oxygen Concentration (%):  [35] 35  Signs/symptoms of respiratory failure include- increased work of breathing, respiratory distress, and hypoxemia . Contributing diagnoses includes -  Pulmonary edema and volume overload  Labs and images were reviewed. Patient Has recent ABG, which has been reviewed. Will treat underlying causes and adjust management of respiratory failure as follows:    Patient with rapid increase in supplemental O2 requirements on 4/26 with somnolence and increased dyspnea. CXR with signs of pulmonary edema. Despite normal BNP, patient appearing hypervolemic on exam.     Plan:  - Continue IV Lasix; received 120mg earlier in the evening.  - Continuous pulse oximetry  - HFNC: Supplemental O2 as needed to maintain O2 sats > 94%; if continuing to have increased O2 demands, may need to place on BiPAP overnight.  - Limit use of sedating medications as tolerated  - Strict I/O's  - ABG/VBG as needed to assess acid-base status; most recent gas with pH 7.339, pCO2 26.9.

## 2024-04-28 NOTE — PLAN OF CARE
Problem: Physical Therapy  Goal: Physical Therapy Goal  Description: Goals to be met by: 24     Patient will increase functional independence with mobility by performin. Supine to sit with Stand-by Assistance  2. Sit to supine with Stand-by Assistance  3. Sit to stand transfer with Stand-by Assistance  4. Bed to chair transfer with Stand-by Assistance using Rolling Walker or LRAD  5. Gait  x 100 feet with Stand-by Assistance using Rolling Walker.   6. Ascend/descend 2 stair with right or left Handrails Contact Guard Assistance using No Assistive Device.   7. Lower extremity exercise program x15 reps per handout, with assistance as needed    Outcome: Progressing    Patient re-evaluated, goals reviewed and updated as appropriate for patient's current presentation.

## 2024-04-28 NOTE — PROGRESS NOTES
[facility] - Card[facility]  Critical Care Medicine  Progress Note    Patient Name: Gail Mckinnon  MRN: [id number]  Admission Date: 4/19/2024  Hospital Length of Stay: 8 days  Code Status: Full Code  Attending Provider: Scott Kidd MD  Primary Care Provider: Marah Santo MD   Principal Problem: Acute hypoxemic respiratory failure    Subjective:     HPI:  Gail Mckinnon is a 54 year old woman with HTN, depression/anxiety, thyroid problem, and sigmoid carcinoma (s/p multiple therapies) who presented on 4/19 with worsening left hip pain. Patient's recent hip imaging showed evidence of disease progression. Outpatient plan was to start fruquintinib. In ED patient's vitals were stable and labs were notable for MARILOU (Cr 2.8). Patient was admitted for pain management and MARILOU. Imaging showed tumor involvement at left hip iliopsoas. With rising creatinine, ultrasound was done that showed moderate bilateral hydronephrosis and urology was consulted. S/p attempt for bilateral ureteral stents that was unsuccessful, followed by IR-guided bilateral nephrostomy tube placements for hydronephrosis on 4/22/24. Patient s/p palliative radiation to painful left iliopsoas metastasis/left pelvis/inguinal adenopathy on 4/24. Pain improving but persistent with Palliative Medicine assisting with analgesia regimen, last regimen with: Oxycontin 40 mg PO BID, oxycodone 20 mg PO q4h PRN for severe pain and hydromorphone 1 mg IV q4h PRN for breakthrough pain    On 4/26, patient was noted to have been more somnolent and dyspneic in the morning, which was felt to be due to volume overload. CXR showed pulmonary edema. Was placed on NC. She received 3 doses of lasix on 4/26 (40 IV at 9am, 80 IV at noon, and 120 IV at 8pm) with about 700cc UOP charted through the day. She continued to be somnolent and dyspneic and VBG showed pH 7.288 pCO2 33.3. On evening of 4/26, Rapid Response was called given her SpO2 at 83% despite increase in  supplemental O2 to 6L NC. Patient placed on 100% NRB with SpO2 improving to 94%. Given increase in O2 requirements, patient transferred to MICU for acute hypoxemic respiratory failure.    Hospital/ICU Course:  4/28:  Patient weaned from Airvo yesterday to nasal cannula and is on room air this morning.  Some confusion this morning due to opioids but mentation better.      Interval History/Significant Events:  No acute events noted overnight.  On ambient air this morning.  Some confusion after taking pain medications but better now.  Having bowel movement this morning.  Discussed with patient and daughter at the bedside.    Review of Systems   Respiratory:  Negative for shortness of breath.    Cardiovascular:  Negative for chest pain.   All other systems reviewed and are negative.    Objective:     Vital Signs (Most Recent):  Temp: 97.7 °F (36.5 °C) (04/28/24 0400)  Pulse: (!) 114 (04/28/24 0701)  Resp: 20 (04/28/24 0701)  BP: 118/67 (04/28/24 0701)  SpO2: 96 % (04/28/24 0701) Vital Signs (24h Range):  Temp:  [97.7 °F (36.5 °C)-99.9 °F (37.7 °C)] 97.7 °F (36.5 °C)  Pulse:  [107-122] 114  Resp:  [15-25] 20  SpO2:  [92 %-99 %] 96 %  BP: (100-144)/(56-86) 118/67   Weight: (!) 162 kg (357 lb 2.3 oz)  Body mass index is 57.64 kg/m².      Intake/Output Summary (Last 24 hours) at 4/28/2024 0731  Last data filed at 4/28/2024 0600  Gross per 24 hour   Intake 680 ml   Output 1350 ml   Net -670 ml          Physical Exam  Vitals and nursing note reviewed.   Constitutional:       General: She is not in acute distress.     Appearance: Normal appearance. She is normal weight. She is not ill-appearing, toxic-appearing or diaphoretic.   HENT:      Head: Normocephalic and atraumatic.      Right Ear: External ear normal.      Left Ear: External ear normal.      Nose: Nose normal.   Cardiovascular:      Rate and Rhythm: Regular rhythm. Tachycardia present.      Pulses: Normal pulses.      Heart sounds: Normal heart sounds. No murmur  heard.     No friction rub. No gallop.      Comments: No JVD or peripheral edema  Pulmonary:      Effort: Pulmonary effort is normal. No respiratory distress.      Breath sounds: Normal breath sounds. No wheezing, rhonchi or rales.   Abdominal:      General: Abdomen is flat. Bowel sounds are normal. There is no distension.      Palpations: Abdomen is soft.      Tenderness: There is no abdominal tenderness. There is no guarding or rebound.   Musculoskeletal:         General: No swelling or tenderness. Normal range of motion.   Skin:     General: Skin is warm and dry.      Coloration: Skin is not jaundiced or pale.   Neurological:      General: No focal deficit present.      Mental Status: She is alert. Mental status is at baseline.   Psychiatric:         Mood and Affect: Mood normal.         Behavior: Behavior normal.         Thought Content: Thought content normal.         Judgment: Judgment normal.            Vents:  Oxygen Concentration (%): 35 (04/27/24 1200)  Lines/Drains/Airways       Central Venous Catheter Line  Duration             Port A Cath Single Lumen Subclavian Right -- days              Drain  Duration                  Nephrostomy 04/22/24 1546 Right 10 Fr. 5 days         Nephrostomy 04/22/24 1547 Left 10 Fr. 5 days              Peripheral Intravenous Line  Duration                  Peripheral IV - Single Lumen 04/24/24 0403 22 G Anterior;Right Forearm 4 days         Peripheral IV - Single Lumen 04/26/24 2241 20 G Right Antecubital 1 day                  Significant Labs:    CBC/Anemia Profile:  Recent Labs   Lab 04/26/24  2314 04/27/24  0419 04/28/24  0250   WBC 7.54 7.36 5.99   HGB 10.9* 10.4* 9.8*   HCT 33.6* 32.3* 30.3*    267 232   MCV 92 92 93   RDW 28.6* 28.5* 28.2*        Chemistries:  Recent Labs   Lab 04/26/24  2218 04/27/24  0419 04/28/24  0250    135* 137   K 4.6 4.6 4.3    108 111*   CO2 16* 13* 16*   BUN 65* 67* 67*   CREATININE 3.1* 3.1* 2.3*   CALCIUM 10.4 10.2 10.5    ALBUMIN 2.5* 2.3* 2.2*   PROT 6.8 6.3 6.3   BILITOT 0.5 0.5 0.6   ALKPHOS 105 101 103   ALT <5* <5* 10   AST 15 17 22   MG 2.9* 2.9* 2.7*   PHOS 4.3 4.3 3.5       All pertinent labs within the past 24 hours have been reviewed.    Significant Imaging:  I have reviewed all pertinent imaging results/findings within the past 24 hours.    ABG  Recent Labs   Lab 04/26/24 2124   PH 7.339*   PO2 73*   PCO2 26.9*   HCO3 14.5*   BE -11*     Assessment/Plan:     Neuro  Acute metabolic encephalopathy  Resolved    Suspect uremia vs opioids vs delirium.     --CT head ordered but held due to respiratory status  --holding gabapentin and further opioids dosing for now  --delirium precautions  --neuro checks  --appears to be improving    Pulmonary  * Acute hypoxemic respiratory failure  Resolved    Patient with Hypoxic Respiratory failure which is Acute.  she is not on home oxygen. Supplemental oxygen was provided and noted- Oxygen Concentration (%):  [35] 35  Signs/symptoms of respiratory failure include- increased work of breathing, respiratory distress, and hypoxemia . Contributing diagnoses includes -  Pulmonary edema and volume overload  Labs and images were reviewed. Patient Has recent ABG, which has been reviewed. Will treat underlying causes and adjust management of respiratory failure as follows:    Patient with rapid increase in supplemental O2 requirements on 4/26 with somnolence and increased dyspnea. CXR with signs of pulmonary edema. Despite normal BNP, patient appearing hypervolemic on exam.     Plan:  - Continue IV Lasix; received 120mg earlier in the evening.  - Continuous pulse oximetry  - HFNC: Supplemental O2 as needed to maintain O2 sats > 94%; if continuing to have increased O2 demands, may need to place on BiPAP overnight.  - Limit use of sedating medications as tolerated  - Strict I/O's  - ABG/VBG as needed to assess acid-base status; most recent gas with pH 7.339, pCO2 26.9.    Renal/  Hydronephrosis,  bilateral  S/p bilateral nephrostomy tubes per IR on 4/22. See MARILOU    MARILOU (acute kidney injury)  Improving    Pt reports decreased urine output and decreased PO intake. Creatinine 2.8 on admit, baseline around 0.9 - 1.0. US retroperitoneal: Moderate bilateral hydronephrosis, corresponding to findings on prior CT performed 03/11/2024.   Likely pre-renal from dehydration and volume losses superimposed on post-renal obstruction. Now s/p bilateral nephrostomy placement.    - Daily CMP  - Strict I&Os and daily weights   - Avoid nephrotoxic agents such as NSAIDs, gadolinium and IV radiocontrast.  - Renally dose meds to current GFR.  - Maintain MAP > 65.    Oncology  Malignant neoplasm of sigmoid colon  Patient of Dr. Pimentel, has progressed through multiple therapies and recently on single agent Debo) with evidence of disease progression and active OP plans to start fruquintinib.    - Heme/Onc and Palliative Medicine following    Endocrine  Hypothyroidism  Continue levothyroxine.     GI  Ileus  KUB 4/26 with concerns for ileus. Decreased bowel sounds. Last recorded BM 4/25.   --may need NG tube for decompression  --holding most PO medications  --bowel rest    Other constipation  Bowel regimen held due to ileus    Orthopedic  Hip pain  Patient initially presented with severe left hip pain in setting of known sigmoid carcinoma. CT evidence of cancer involvement at left hip illiopsoas which is likely contributing to pain. Pain improving with adjustments made to regimen per palliative.  - S/p palliative radiation to L iliopsoas with Rad Onc     Plan:  - Palliative medicine following, appreciate assistance  - Previous regimen: Oxycontin 40 mg BID, oxycodone to 20 mg q4h PRN, hydromorphone 1 mg   - Holding at this time given concern for potential effects on respiratory and mental status    Palliative Care  Palliative care encounter  Palliative care team following.  HCPOA: sonStar   Daughter states patient would want to  be a full code but would not want to be kept alive on life support if no signs of meaningful recovery.             Scott Kidd M.D.  Rapid Response/Critical Care  Department of Pulmonary Medicine  [facility]        N.B.: Portions of this note was dictated using M*Modal Fluency Direct--there may be voice recognition errors occasionally missed on review.

## 2024-04-28 NOTE — HOSPITAL COURSE
4/28:  Patient weaned from Airvo yesterday to nasal cannula and is on room air this morning.  Some confusion this morning due to opioids but mentation better.

## 2024-04-28 NOTE — ASSESSMENT & PLAN NOTE
Improving    Pt reports decreased urine output and decreased PO intake. Creatinine 2.8 on admit, baseline around 0.9 - 1.0. US retroperitoneal: Moderate bilateral hydronephrosis, corresponding to findings on prior CT performed 03/11/2024.   Likely pre-renal from dehydration and volume losses superimposed on post-renal obstruction. Now s/p bilateral nephrostomy placement.    - Daily CMP  - Strict I&Os and daily weights   - Avoid nephrotoxic agents such as NSAIDs, gadolinium and IV radiocontrast.  - Renally dose meds to current GFR.  - Maintain MAP > 65.

## 2024-04-28 NOTE — PT/OT/SLP RE-EVAL
Physical Therapy Re-evaluation    Patient Name:  Gail Mckinnon   MRN:  [id number]    Co-treatment with OT performed due to patient complexity and deficits, requiring two skilled therapists to appropriately and safely assess patient's strength and endurance while facilitating functional tasks in addition to accommodating for patient's activity tolerance.    Recommendations:     Discharge Recommendations: High Intensity Therapy  Discharge Equipment Recommendations: hospital bed   Barriers to discharge: None    Assessment:     Gail Mckinnon is a 54 y.o. female admitted with a medical diagnosis of Acute hypoxemic respiratory failure.  She presents with the following impairments/functional limitations: weakness, impaired endurance, gait instability, impaired balance, pain, impaired cognition, impaired functional mobility, decreased lower extremity function. Patient with high pain levels this date however able to tolerate EOB mobility. She demonstrated good sitting balance only limited by hip and abdomen pain and good strength in BLE. Patient is expected to progress well pending pain management. Recommending high intensity post-acute therapy given current mobility level vs baseline function.     Rehab Prognosis:  good; patient would benefit from acute skilled PT services to address these deficits and reach maximum level of function.      Recent Surgery: Procedure(s) (LRB):  CYSTOSCOPY (N/A) 6 Days Post-Op    Plan:     During this hospitalization, patient to be seen 4 x/week to address the above listed problems via therapeutic activities, gait training, therapeutic exercises, neuromuscular re-education  Plan of Care Expires:  05/26/24  Plan of Care Reviewed with: patient, daughter, family, mother    Subjective     Communicated with RN prior to session.  Patient found HOB elevated with Other (comments) (telemetry, pulse ox, blood pressure cuff, nephrostomy bag) upon PT entry to room, agreeable to evaluation.  "Family member in room.    Chief Complaint: pain  Patient comments/goals: "ow ow ow!" "My earring is pulling" patient referring to pulse ox.   Pain/Comfort:  Pain Rating 1: 10/10  Location - Side 1: Bilateral  Location - Orientation 1: generalized  Location 1: hip (and abdomen)  Pain Addressed 1: Reposition, Distraction  Pain Rating Post-Intervention 1: 10/10    Patients cultural, spiritual, Denominational conflicts given the current situation: no      Objective:     Patient found with: Other (comments) (telemetry, pulse ox, blood pressure cuff, nephrostomy bag)     General Precautions: Standard, fall  Orthopedic Precautions: N/A  Braces: N/A  Respiratory Status: Room air    Exams:  Cognitive Exam:  Patient is oriented to Person, Place, and Time  RLE ROM: WFL  RLE Strength: 4/5 knee extension, ankle DF/PF  LLE ROM: Deficits: due to pain, difficult to fully assess   LLE Strength: 4/5 knee extension     Functional Mobility:  Bed Mobility:     Scooting: total assistance and of 2 persons  Supine to Sit: total assistance and of 2 persons  Sit to Supine: total assistance and of 2 persons  Transfers:  unable to attempt   Balance:   Sitting static: CGA to periods of SBA  Sitting dynamic: modA due to pain    AM-PAC 6 CLICK MOBILITY  Total Score:8       Treatment and Education:   Education: patient educated on POC, need for therapy, safety with mobility, discharge recommendations and equipment recommendations. Patient verbalized understanding of all topics.   Supine LLE PROM hip flexion and knee flexion to increased tolerated ROM in preparation for sitting, with cues for deep breathing to decrease pain.  EOB ankle pumps and knee extension kicks with external target provided.  Cues with sitting EOB to activate core mm to center body forward and decrease assistance level needed.      Patient left HOB elevated with all lines intact, call button in reach, RN notified, and family member present.    GOALS:   Multidisciplinary Problems  "      Physical Therapy Goals          Problem: Physical Therapy    Goal Priority Disciplines Outcome Goal Variances Interventions   Physical Therapy Goal     PT, PT/OT Progressing     Description: Goals to be met by: 24     Patient will increase functional independence with mobility by performin. Supine to sit with Stand-by Assistance  2. Sit to supine with Stand-by Assistance  3. Sit to stand transfer with Stand-by Assistance  4. Bed to chair transfer with Stand-by Assistance using Rolling Walker or LRAD  5. Gait  x 100 feet with Stand-by Assistance using Rolling Walker.   6. Ascend/descend 2 stair with right or left Handrails Contact Guard Assistance using No Assistive Device.   7. Lower extremity exercise program x15 reps per handout, with assistance as needed                         History:     Past Medical History:   Diagnosis Date    Anxiety     Depression     FH: ovarian cancer 2020    Hx of psychiatric care     Effexor, Paxil, Lexapro, Zoloft, Wellbutrin, Trazodone Buspar    Hypertension     Hyperthyroidism     Hypothyroid     Kidney calculi     Malignant neoplasm of sigmoid colon 2020    Menorrhagia     Multinodular goiter 2012    Palpitation     Psychiatric problem     Venous insufficiency     Vulvar lesion        Past Surgical History:   Procedure Laterality Date     SECTION, CLASSIC      x3    COLON SURGERY      COLONOSCOPY N/A 2020    Procedure: COLONOSCOPY;  Surgeon: Shane Parker MD;  Location: [facility] ENDO;  Service: Endoscopy;  Laterality: N/A;    COLONOSCOPY N/A 2022    Procedure: COLONOSCOPY;  Surgeon: Shane Parker MD;  Location: [facility] ENDO;  Service: Endoscopy;  Laterality: N/A;    COLONOSCOPY N/A 2023    Procedure: COLONOSCOPY;  Surgeon: Shane Parker MD;  Location: [facility];  Service: Endoscopy;  Laterality: N/A;  no cecum anastomosis    CYSTOSCOPY N/A 2024    Procedure: CYSTOSCOPY;  Surgeon: Willie Bailey Jr., MD;   Location: [facility] OR 1ST FLR;  Service: Urology;  Laterality: N/A;    CYSTOSCOPY W/ URETERAL STENT PLACEMENT Bilateral 03/25/2020    Procedure: CYSTOSCOPY, WITH URETERAL STENT INSERTION;  Surgeon: Claudio Tyson MD;  Location: [facility] OR 2ND FLR;  Service: Urology;  Laterality: Bilateral;    ESOPHAGOGASTRODUODENOSCOPY N/A 4/9/2024    Procedure: EGD (ESOPHAGOGASTRODUODENOSCOPY);  Surgeon: Shane Parker MD;  Location: [facility] ENDO;  Service: Gastroenterology;  Laterality: N/A;    EXAMINATION UNDER ANESTHESIA N/A 01/16/2024    Procedure: Exam under anesthesia-vagina;  Surgeon: Eli Her MD;  Location: [facility] OR;  Service: OB/GYN;  Laterality: N/A;    EXCISION-WIDE LOCAL Left 01/16/2024    Procedure: EXCISION-WIDE LOCAL -  Labia Majora;  Surgeon: Eli Her MD;  Location: [facility] OR;  Service: OB/GYN;  Laterality: Left;  upper left side of labia majora    EXCISIONAL BIOPSY, LYMPH NODE  07/2022    abdominal x 4    INSERTION OF TUNNELED CENTRAL VENOUS CATHETER (CVC) WITH SUBCUTANEOUS PORT N/A 05/01/2020    Procedure: [id number]-CATH;  Surgeon: [facility] Diagnostic Provider;  Location: [facility] OR 2ND FLR;  Service: Radiology;  Laterality: N/A;  Room 189/[facility]    LAPAROSCOPIC SIGMOIDECTOMY N/A 03/25/2020    Procedure: COLECTOMY, SIGMOID, LAPAROSCOPIC, flex sig, ERAS high;  Surgeon: Silvio Man MD;  Location: [facility] OR 2ND FLR;  Service: Colon and Rectal;  Laterality: N/A;    MOBILIZATION OF SPLENIC FLEXURE  03/25/2020    Procedure: MOBILIZATION, SPLENIC FLEXURE;  Surgeon: Silvio Man MD;  Location: [facility] OR 2ND FLR;  Service: Colon and Rectal;;    TONSILLECTOMY      TOTAL ABDOMINAL HYSTERECTOMY W/ BILATERAL SALPINGOOPHORECTOMY N/A 03/25/2020    Procedure: HYSTERECTOMY, TOTAL, ABDOMINAL, WITH BILATERAL SALPINGO-OOPHORECTOMY;  Surgeon: Keron Brady MD;  Location: [facility] OR 2ND FLR;  Service: Oncology;  Laterality: N/A;       Time Tracking:     PT Received On: 04/28/24  PT Start Time: 0921     PT Stop  Time: 0949  PT Total Time (min): 28 min     Billable Minutes: Re-eval 10 minutes and Therapeutic Exercise 18 minutes      04/28/2024

## 2024-04-28 NOTE — PLAN OF CARE
Problem: Occupational Therapy  Goal: Occupational Therapy Goal  Description: Goals to be met by: 5/26/24    Patient will increase functional independence with ADLs by performing:    LE Dressing with Supervision and Assistive Devices as needed.  Grooming while standing at sink with Modified [place].  Toileting from toilet with [place] for hygiene and clothing management.   Supine to sit with Modified [place].  Toilet transfer to toilet with Modified [place].    Outcome: Progressing

## 2024-04-28 NOTE — PT/OT/SLP RE-EVAL
Occupational Therapy   [id number]    Name: Gail Mckinnon  MRN: [id number]  Admitting Diagnosis:  Acute hypoxemic respiratory failure  Recent Surgery: Procedure(s) (LRB):  CYSTOSCOPY (N/A) 6 Days Post-Op    Recommendations:     Discharge Recommendations: High Intensity Therapy  Discharge Equipment Recommendations:  (TBD closer to d/c)  Barriers to discharge:  None    Assessment:     Gail Mckinnon is a 54 y.o. female with a medical diagnosis of Acute hypoxemic respiratory failure.  She presents to ICU 4/26 2* increased O2 requirements. Pt with significant decline in functional status since last visit along with severe pain.  Performance deficits affecting function are weakness, impaired endurance, impaired self care skills, impaired functional mobility, gait instability, impaired balance, pain, impaired cardiopulmonary response to activity, decreased safety awareness, decreased lower extremity function, decreased upper extremity function, decreased coordination, edema.  Pt benefits from co-treatment with PT to accommodate pt's activity tolerance and progression with therapy.      Rehab Prognosis:  Good; patient would benefit from acute skilled OT services to address these deficits and reach maximum level of function.       Plan:     Patient to be seen 4 x/week to address the above listed problems via self-care/home management, therapeutic activities, therapeutic exercises  Plan of Care Expires: 05/28/24  Plan of Care Reviewed with: patient, daughter    Subjective     Chief Complaint: pain  Patient/Family stated goals: to get better and go home  Communicated with: RN prior to session.  Pain/Comfort:  Pain Rating 1: 10/10  Location - Side 1: Bilateral  Location - Orientation 1: generalized  Location 1:  (legs, hip and abdomen)  Pain Addressed 1: Reposition, Distraction, Cessation of Activity, Pre-medicate for activity  Pain Rating Post-Intervention 1: 10/10    Objective:     Communicated with: RN  prior to session.  Patient found supine with: blood pressure cuff, pulse ox (continuous), telemetry, peripheral IV (B nephrostomy bags) upon OT entry to room.    General Precautions: Standard, fall  Orthopedic Precautions:    Braces:    Respiratory Status: Room air    Occupational Performance:    Bed Mobility:    Patient completed Scooting/Bridging with total assistance and 2 persons  Patient completed Supine to Sit with total assistance and 2 persons  Patient completed Sit to Supine with total assistance and 2 persons    Functional Mobility/Transfers:  UT 2* severe pain worsening while sitting EOB  Functional Mobility: NT    Activities of Daily Living:  Grooming: contact guard assistance for washing face seated EOB; mod A for combing hair  Lower Body Dressing: total assistance    Toileting: total assistance bed level    Cognitive/Visual Perceptual:  Oriented to: Person, Place, Time and Situation  Follows Commands/attention: Follows multistep  commands  Communication: clear/fluent  Memory:  No Deficits noted  Safety awareness/insight to disability: intact  Coping skills/emotional control: Appropriate to situation    Physical Exam:  B UE AAROM WFL  B UE MMT grossly 3/5 noted through observation; NFT 2* pain  Sensation intact    AMPAC 6 Click:  AMPA Total Score: 9    Treatment & Education:  Pt ed on OT POC  Pt sat EOB x 12 min with CGA and brief bouts of SBA  Pt ed on ROM ex's 3x daily for increased overall strength and endurance  Pt ed on importance of HOB elevation for lung expansion and overall stretching of LE    Patient left HOB elevated with all lines intact, call button in reach, RN notified, and dtr present    GOALS:   Multidisciplinary Problems       Occupational Therapy Goals          Problem: Occupational Therapy    Goal Priority Disciplines Outcome Interventions   Occupational Therapy Goal     OT, PT/OT Progressing    Description: Goals to be met by: 5/26/24    Patient will increase functional  independence with ADLs by performing:    LE Dressing with Supervision and Assistive Devices as needed.  Grooming while standing at sink with Modified [place].  Toileting from toilet with [place] for hygiene and clothing management.   Supine to sit with Modified [place].  Toilet transfer to toilet with Modified [place].                         History:     Past Medical History:   Diagnosis Date    Anxiety     Depression     FH: ovarian cancer 2020    Hx of psychiatric care     Effexor, Paxil, Lexapro, Zoloft, Wellbutrin, Trazodone Buspar    Hypertension     Hyperthyroidism     Hypothyroid     Kidney calculi     Malignant neoplasm of sigmoid colon 2020    Menorrhagia     Multinodular goiter 2012    Palpitation     Psychiatric problem     Venous insufficiency     Vulvar lesion          Past Surgical History:   Procedure Laterality Date     SECTION, CLASSIC      x3    COLON SURGERY      COLONOSCOPY N/A 2020    Procedure: COLONOSCOPY;  Surgeon: Shane Parker MD;  Location: [facility];  Service: Endoscopy;  Laterality: N/A;    COLONOSCOPY N/A 2022    Procedure: COLONOSCOPY;  Surgeon: Shane Parker MD;  Location: [facility];  Service: Endoscopy;  Laterality: N/A;    COLONOSCOPY N/A 2023    Procedure: COLONOSCOPY;  Surgeon: Shane Parker MD;  Location: [facility];  Service: Endoscopy;  Laterality: N/A;  no cecum anastomosis    CYSTOSCOPY N/A 2024    Procedure: CYSTOSCOPY;  Surgeon: Willie Bailey Jr., MD;  Location: [facility] OR [address] [place] [address];  Service: Urology;  Laterality: N/A;    CYSTOSCOPY W/ URETERAL STENT PLACEMENT Bilateral 2020    Procedure: CYSTOSCOPY, WITH URETERAL STENT INSERTION;  Surgeon: Claudio Tyson MD;  Location: [facility] OR [address] [place] [address];  Service: Urology;  Laterality: Bilateral;    ESOPHAGOGASTRODUODENOSCOPY N/A 2024    Procedure: EGD (ESOPHAGOGASTRODUODENOSCOPY);  Surgeon: Shane Parker MD;  Location: [facility];  Service:  Gastroenterology;  Laterality: N/A;    EXAMINATION UNDER ANESTHESIA N/A 01/16/2024    Procedure: Exam under anesthesia-vagina;  Surgeon: Eli Her MD;  Location: [facility] OR;  Service: OB/GYN;  Laterality: N/A;    EXCISION-WIDE LOCAL Left 01/16/2024    Procedure: EXCISION-WIDE LOCAL -  Labia Majora;  Surgeon: Eli Her MD;  Location: [facility] OR;  Service: OB/GYN;  Laterality: Left;  upper left side of labia majora    EXCISIONAL BIOPSY, LYMPH NODE  07/2022    abdominal x 4    INSERTION OF TUNNELED CENTRAL VENOUS CATHETER (CVC) WITH SUBCUTANEOUS PORT N/A 05/01/2020    Procedure: [id number]-[id number];  Surgeon: [facility] Diagnostic Provider;  Location: [facility] OR 2ND FLR;  Service: Radiology;  Laterality: N/A;  Room 189/[facility]    LAPAROSCOPIC SIGMOIDECTOMY N/A 03/25/2020    Procedure: COLECTOMY, SIGMOID, LAPAROSCOPIC, flex sig, ERAS high;  Surgeon: Silvio Man MD;  Location: [facility] OR 2ND FLR;  Service: Colon and Rectal;  Laterality: N/A;    MOBILIZATION OF SPLENIC FLEXURE  03/25/2020    Procedure: MOBILIZATION, SPLENIC FLEXURE;  Surgeon: Silvio Man MD;  Location: [facility] OR 2ND FLR;  Service: Colon and Rectal;;    TONSILLECTOMY      TOTAL ABDOMINAL HYSTERECTOMY W/ BILATERAL SALPINGOOPHORECTOMY N/A 03/25/2020    Procedure: HYSTERECTOMY, TOTAL, ABDOMINAL, WITH BILATERAL SALPINGO-OOPHORECTOMY;  Surgeon: Keron Brady MD;  Location: [facility] OR 2ND FLR;  Service: Oncology;  Laterality: N/A;       Time Tracking:     OT Date of Treatment: 04/28/24  OT Start Time: 0922  OT Stop Time: 0949  OT Total Time (min): 27 min    Billable Minutes:Re-eval 10  Therapeutic Activity 15    4/28/2024

## 2024-04-28 NOTE — PROVIDER TRANSFER
Transfer Note    Brief History: Gail Mckinnon is a 54 y.o. 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  The patient was also found to have renal failure with hydronephrosis.  Urology was consulted but was unsuccessful in placing ureteral stents.  IR was able to place bilateral nephrostomy tubes on 4/22 and the patient started palliative radiation through the left iliopsoas metastasis.  Palliative Care was also consulted and was helping to manage her pain regimen.  On 4/26 the patient became more somnolent and dyspneic and found to be in volume overload.  She was stepped up to the ICU for increasing oxygen requirements and airway monitoring.  She was on Airvo but quickly weaned down to nasal cannula yesterday after diuresis.  She is on room air today with good oxygen saturation.  Her mentation has returned to baseline.  She is now stable to step-down.    Pending Studies: None    Barriers to Discharge:  None        Scott Kidd M.D.  Rapid Response/Critical Care  Department of Pulmonary Medicine  [facility]  Spectralink: [id number]        N.B.: Portions of this note was dictated using M*Modal Fluency Direct--there may be voice recognition errors occasionally missed on review.

## 2024-04-29 NOTE — ASSESSMENT & PLAN NOTE
Ms. 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 Of note, patient's recent imaging showed evidence of progression. Outpatient plan to start fruquintinib. In ED patient noted to have MARILOU. Imaging showed tumor involvement at left hip iliopsoas. S/p attempt for bilateral ureteral stents that was unsuccessful, followed by IR-guided bilateral nephrostomy tube placements for hydronephrosis on 4/22/24. Stepped up to the MICU on 4/26 for acute respiratory failure, stepped down on 4/28 for resolution of respiratory distress. Palliative Medicine consulted for malignant symptom management.    Sigmoid cancer/MARILOU/bilateral hydronephrosis/HTN/thyroid problem/other medical problems  - plan per primary team and other speciality consultants (Urology)    GOC/ACP  - patient decisional and by herself in room  - no ACP documents uploaded into EMR  - patient follows with Palliative Medicine NP Claudio and Dr. Hill on [facility] already; next appt: 04/23/24   - On discharge, I will contact outpatient palliative medicine team on [facility] to arrange new follow up appointment  - NOK: patient's children  - On 04/20/2024 patient verbally stated she would want her middle son, Star Mckinnon, to be decision maker at [phone]  - will follow up at future visits to fill out ACP forms  - goals: improvement in symptoms to be able to go on cruise on Thursday 4/25/24 with friends; also continue life prolonging measures  - code status not discussed    Cancer related pain  - patient reporting severe pain in left hip; she also has pain at times in abdomen/groin and acute tenderness from new bilateral nephrostomy tube placements  - 24 hour OME: 30 MME (had been consistently requiring >200 MME/day prior to decompensation)  - outpatient regimen: oxycodone-apap  mg q4h prn, gabapentin 300 mg qhs, and flexeril 5 mg q8h prn.  This was not providing any relief.  - Oxycontin 20 mg PO BID restarted 4/29  - Continue oxycodone 10 mg PO q4h PRN severe pain  - If needing an agent for acute pain relief, consider hydromorphone 0.5 mg IV q4h PRN breakthrough pain. She did do well with 1 mg previously  - Psych/onc consulted for anxiety component driving uncontrolled pain  - Rad/onc engaged, performed simulation on 4/23  - Continue gabapentin 300 mg PO qHS (unable to increase due to creatinine clearance)    Nausea/Anorexia  - patient with increased nausea due to increased pain  - patient with poor appetite constantly  - would benefit from nutrition consult while in the hospital  - continue zofran 4 mg q8h prn and prochlorperazine 10 mg q6h prn    Constipation  - Bowel regimen being appropriately withheld due to multiple loose stools (last 4/29)  - uses senna at home with some relief    Anxiety/depression/spiritual distress  - patient feeling very overwhelmed with news of progression  - 4/21/24: patient spoke of possible urologic surgery (stents vs nephrostomy tubes). Patient feeling very overwhelmed by news of additional issues. Patient upset with ongoing bad news as well as possibility of not being able to make trip with her friends. Patient spoke about how she feels that this is her last trip she will be able to make, and she wants to be able to enjoy herself and the time she has left.   - she reports not feeling at peace spiritually  - 4/23/24: Emotional support provided in context of her malignancy and the barriers it has created in living her life (like attending cruise with friends this Thursday 4/25/24) and her understandable fears related to perception from others related to opioids for appropriate treatment of neoplasm-related pain.  - continue home buproprion  - resumed home lorazepam 1 mg PO BID PRN anxiety  - Psych/onc consulted    Acute encephalopathy  - Likely multifactorial, considering dramatic decrease in opioid administration,  initiated when she acutely decompensated for acute respiratory failure  - Content of speech is very paranoid, this is not her baseline  - QTc from 3/11/24 is normal (425 ms)  - Renal function markedly recovering  - Pain and anxiety management as above  - Consider risperidone 2 mg PO qHS PRN severe agitation, paranoia

## 2024-04-29 NOTE — PROGRESS NOTES
Nurses Note -- 4 Eyes      4/29/2024   6:34 PM      Skin assessed during: Q Shift Change      [x] No Altered Skin Integrity Present    []Prevention Measures Documented      [] Yes- Altered Skin Integrity Present or Discovered   [] LDA Added if Not in Epic (Describe Wound)   [] New Altered Skin Integrity was Present on Admit and Documented in LDA   [] Wound Image Taken    Wound Care Consulted? No    Attending Nurse:  Desmond Michaels RN/Staff Member:  PCT

## 2024-04-29 NOTE — PSYCH
Attempted to meet with patient for ordered Onc Psych consult. Patient was visiting with several family/friends and requested to defer consult to another time. Onc Psych team will attempt consult either later time today or on a later date.    Freddie Cordero Psy.D.

## 2024-04-29 NOTE — ASSESSMENT & PLAN NOTE
Patient with Hypoxic Respiratory failure which is Acute.  she is not on home oxygen. Supplemental oxygen was provided and noted-      .   Signs/symptoms of respiratory failure include- tachypnea and respiratory distress. Contributing diagnoses includes -  Volume overload  Labs and images were reviewed. Patient Has not had a recent ABG. Will treat underlying causes and adjust management of respiratory failure as follows-     Pt received 240mg IV Lasix and was ultimately transferred to MICU and placed on HFNC. Subsequently weaned to RA. Stepped down, requiring supplemental O2 overnight. Suspect component of ROSE MARY/anxiety contributing to night time O2 requirement    - Wean O2 as tolerated

## 2024-04-29 NOTE — CONSULTS
Inpatient consult to Physical Medicine Rehab  Consult performed by: Tyra Poon NP  Consult ordered by: Dimitry Carrasco MD  Reason for consult: Rehab      Consult received.     OLLIE Bowen, FNP-C  Physical Medicine & Rehabilitation   04/29/2024

## 2024-04-29 NOTE — CARE UPDATE
"RAPID RESPONSE NURSE CHART REVIEW        Chart Reviewed: 04/29/2024, 11:09 AM    MRN: [id number]  Bed: 801/801 A    Dx: Acute hypoxemic respiratory failure    Gail Mckinnon has a past medical history of Anxiety, Depression, FH: ovarian cancer, psychiatric care, Hypertension, Hyperthyroidism, Hypothyroid, Kidney calculi, Malignant neoplasm of sigmoid colon, Menorrhagia, Multinodular goiter, Palpitation, Psychiatric problem, Venous insufficiency, and Vulvar lesion.    Last VS: /73 (BP Location: Left arm, Patient Position: Lying)   Pulse (!) 117   Temp 97.6 °F (36.4 °C) (Oral)   Resp 18   Ht 5' 6" (1.676 m)   Wt (!) 162 kg (357 lb 2.3 oz)   LMP 01/22/2020   SpO2 97%   Breastfeeding No   BMI 57.64 kg/m²     24H Vital Sign Range:  Temp:  [97.6 °F (36.4 °C)-99 °F (37.2 °C)]   Pulse:  [115-128]   Resp:  [17-23]   BP: (117-142)/(66-78)   SpO2:  [88 %-98 %]     Level of Consciousness (AVPU): alert    Recent Labs     04/27/24  0419 04/28/24  0250 04/29/24  0358   WBC 7.36 5.99 5.56   HGB 10.4* 9.8* 9.6*   HCT 32.3* 30.3* 29.7*    232 219       Recent Labs     04/27/24  0419 04/28/24  0250 04/29/24  0358   * 137 140   K 4.6 4.3 3.8    111* 113*   CO2 13* 16* 17*   BUN 67* 67* 54*   CREATININE 3.1* 2.3* 1.4    97 97   PHOS 4.3 3.5 2.6*   MG 2.9* 2.7* 2.1        Recent Labs     04/26/24  1358 04/26/24 2124   PH 7.288* 7.339*   PCO2 33.3* 26.9*   PO2 29* 73*   HCO3 16.0* 14.5*   POCSATURATED 48 94   BE -11* -11*        OXYGEN:  Flow (L/min) (Oxygen Therapy): 5    MEWS score: 4    Charge RNLuci contacted for tachycardia and hypotension. No additional concerns verbalized at this time. Instructed to call [phone] for further concerns or assistance.    Michelle Mariano RN        "

## 2024-04-29 NOTE — PROGRESS NOTES
[facility] - Oncology ([facility])  Palliative Medicine  Progress Note    Patient Name: Gail Mckinnon  MRN: [id number]  Admission Date: 4/19/2024  Hospital Length of Stay: 9 days  Code Status: Full Code   Attending Provider: Frances Luong MD  Consulting Provider: Raquel Vicente MD  Primary Care Physician: Marah Santo MD  Principal Problem:Acute hypoxemic respiratory failure    Patient information was obtained from patient, parent, and primary team.      Assessment/Plan:     Palliative Care  Palliative care encounter  Ms. 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 Of note, patient's recent imaging showed evidence of progression. Outpatient plan to start fruquintinib. In ED patient noted to have MARILOU. Imaging showed tumor involvement at left hip iliopsoas. S/p attempt for bilateral ureteral stents that was unsuccessful, followed by IR-guided bilateral nephrostomy tube placements for hydronephrosis on 4/22/24. Stepped up to the MICU on 4/26 for acute respiratory failure, stepped down on 4/28 for resolution of respiratory distress. Palliative Medicine consulted for malignant symptom management.    Sigmoid cancer/MARILOU/bilateral hydronephrosis/HTN/thyroid problem/other medical problems  - plan per primary team and other speciality consultants (Urology)    GOC/ACP  - patient decisional and by herself in room  - no ACP documents uploaded into EMR  - patient follows with Palliative Medicine NP Claudio and Dr. Hill on [facility] already; next appt: 04/23/24   - On discharge, I will contact outpatient palliative medicine team on [place] to arrange new follow up appointment  - NOK: patient's children  - On 04/20/2024 patient verbally stated she would want her middle son, Star Mckinnon, to be decision maker at [phone]  - will follow up at future visits to fill out ACP forms  - goals:  improvement in symptoms to be able to go on cruise on Thursday 4/25/24 with friends; also continue life prolonging measures  - code status not discussed    Cancer related pain  - patient reporting severe pain in left hip; she also has pain at times in abdomen/groin and acute tenderness from new bilateral nephrostomy tube placements  - 24 hour OME: 30 MME (had been consistently requiring >200 MME/day prior to decompensation)  - outpatient regimen: oxycodone-apap  mg q4h prn, gabapentin 300 mg qhs, and flexeril 5 mg q8h prn. This was not providing any relief.  - Oxycontin 20 mg PO BID restarted 4/29  - Continue oxycodone 10 mg PO q4h PRN severe pain  - If needing an agent for acute pain relief, consider hydromorphone 0.5 mg IV q4h PRN breakthrough pain. She did do well with 1 mg previously  - Psych/onc consulted for anxiety component driving uncontrolled pain  - Rad/onc engaged, performed simulation on 4/23  - Continue gabapentin 300 mg PO qHS (unable to increase due to creatinine clearance)    Nausea/Anorexia  - patient with increased nausea due to increased pain  - patient with poor appetite constantly  - would benefit from nutrition consult while in the hospital  - continue zofran 4 mg q8h prn and prochlorperazine 10 mg q6h prn    Constipation  - Bowel regimen being appropriately withheld due to multiple loose stools (last 4/29)  - uses senna at home with some relief    Anxiety/depression/spiritual distress  - patient feeling very overwhelmed with news of progression  - 4/21/24: patient spoke of possible urologic surgery (stents vs nephrostomy tubes). Patient feeling very overwhelmed by news of additional issues. Patient upset with ongoing bad news as well as possibility of not being able to make trip with her friends. Patient spoke about how she feels that this is her last trip she will be able to make, and she wants to be able to enjoy herself and the time she has left.   - she reports not feeling at peace  spiritually  - 4/23/24: Emotional support provided in context of her malignancy and the barriers it has created in living her life (like attending cruise with friends this Thursday 4/25/24) and her understandable fears related to perception from others related to opioids for appropriate treatment of neoplasm-related pain.  - continue home buproprion  - resumed home lorazepam 1 mg PO BID PRN anxiety  - Psych/onc consulted    Acute encephalopathy  - Likely multifactorial, considering dramatic decrease in opioid administration, initiated when she acutely decompensated for acute respiratory failure  - Content of speech is very paranoid, this is not her baseline  - QTc from 3/11/24 is normal (425 ms)  - Renal function markedly recovering  - Pain and anxiety management as above  - Consider risperidone 2 mg PO qHS PRN severe agitation, paranoia        I will follow-up with patient. Please contact us if you have any additional questions.    Subjective:     Chief Complaint:   Chief Complaint   Patient presents with    Leg Pain     Left upper thigh pain and swelling. Difficulty ambulating. Denies trauma. On chemo for metastatic colon cancer.         HPI:   Ms. Mckinnon is a 53 y/o F with HTN, depression/anxiety, thyroid problem, and sigmoid carcinoma (s/p multiple lines of treatment) presented with worsening left hip pain. Of note, patient's recent imaging showed evidence of progression. Outpatient plan to start fruquintinib. In ED patient noted to have MARILOU. Imaging showed tumor involvement at left hip iliopsoas. Patient admitted for pain management and MARILOU. Palliative Medicine consulted for symptom management.    Hospital Course:  No notes on file    Interval History: Confused this morning, mother at bedside. Spoke with daughter on speaker phone who was able to listen to my conversation with Ms. Mckinnon..    Prior 24 hour MME: 30 MME  Oxycontin 10 mg BID x 2    Medications:  Continuous Infusions:  Current  Facility-Administered Medications   Medication Dose Route Frequency Last Rate Last Admin     Scheduled Meds:  Current Facility-Administered Medications   Medication Dose Route Frequency    buPROPion  150 mg Oral Daily    cinacalcet  30 mg Oral Daily with breakfast    famotidine  20 mg Oral Daily    gabapentin  300 mg Oral QHS    heparin (porcine)  7,500 Units Subcutaneous Q8H    levothyroxine  350 mcg Oral Before breakfast    oxyCODONE  20 mg Oral Q12H    predniSONE  5 mg Oral Daily     PRN Meds:  Current Facility-Administered Medications:     calcium carbonate, 1,000 mg, Oral, TID PRN    dextrose 10%, 12.5 g, Intravenous, PRN    dextrose 10%, 25 g, Intravenous, PRN    dicyclomine, 20 mg, Oral, BID PRN    glucagon (human recombinant), 1 mg, Intramuscular, PRN    glucose, 16 g, Oral, PRN    glucose, 24 g, Oral, PRN    hydrOXYzine HCL, 25 mg, Oral, TID PRN    lactulose, 10 g, Oral, Q6H PRN    loperamide, 2 mg, Oral, QID PRN    LORazepam, 1 mg, Oral, Q12H PRN    naloxone, 0.02 mg, Intravenous, PRN    ondansetron, 4 mg, Oral, Q8H PRN    ondansetron, 4 mg, Intravenous, Daily PRN    oxyCODONE, 10 mg, Oral, Q4H PRN    prochlorperazine, 10 mg, Oral, Q6H PRN    sodium chloride 0.9%, 10 mL, Intravenous, Q12H PRN    Objective:     Vital Signs (Most Recent):  Temp: 97.5 °F (36.4 °C) (04/29/24 1144)  Pulse: (!) 118 (04/29/24 1144)  Resp: 18 (04/29/24 1144)  BP: 126/69 (04/29/24 1144)  SpO2: (!) 93 % (04/29/24 1144) Vital Signs (24h Range):  Temp:  [97.5 °F (36.4 °C)-99 °F (37.2 °C)] 97.5 °F (36.4 °C)  Pulse:  [116-128] 118  Resp:  [18-23] 18  SpO2:  [88 %-98 %] 93 %  BP: (117-142)/(66-78) 126/69     Weight: (!) 162 kg (357 lb 2.3 oz)  Body mass index is 55.94 kg/m².       Physical Exam  Vitals reviewed.   Constitutional:       Appearance: She is not toxic-appearing.   HENT:      Head: Normocephalic and atraumatic.      Right Ear: External ear normal.      Left Ear: External ear normal.      Nose: Nose normal.      Mouth/Throat:       Mouth: Mucous membranes are moist.   Eyes:      Extraocular Movements: Extraocular movements intact.      Conjunctiva/sclera: Conjunctivae normal.   Neck:      Comments: Trachea midline  Cardiovascular:      Rate and Rhythm: Normal rate.   Pulmonary:      Effort: Pulmonary effort is normal. No respiratory distress.   Abdominal:      Palpations: Abdomen is soft.   Musculoskeletal:      Cervical back: Normal range of motion.   Neurological:      General: No focal deficit present.      Mental Status: She is alert. She is disoriented.   Psychiatric:         Mood and Affect: Mood is anxious and depressed. Affect is tearful.         Behavior: Behavior normal.         Judgment: Judgment normal.            Review of Symptoms      Symptom Assessment (ESAS 0-10 Scale)  Unable to complete assessment due to Mental status change     CAM / Delirium:  Negative  Constipation:  Positive  Diarrhea:  Negative      Bowel Management Plan (BMP):  Yes      Pain Assessment:  OME in 24 hours:  125  Location(s): leg    Leg       Location: left        Quality: Aching, burning, sharp and pressure-like        Quantity: 8/10 in intensity        Chronicity: Onset 1 (greater than) week(s) ago, unchanged        Aggravating Factors: Activity and walking        Alleviating Factors: Opiates        Associated Symptoms: None    Pain Assessment in Advanced Demential Scale (PAINAD)   Breathing - Independent of vocalization:  0  Negative vocalization:  0  Facial expression:  1  Body language:  0  Consolability:  1  Total:  2    Modified Ramona Scale:  0    Living Arrangements:  Lives with family and Lives in home    Psychosocial/Cultural:   See Palliative Psychosocial Note: No  Social Issues Identified: Coping deficit pt/family and Mental Health  Bereavement Risk: No  Caregiver Needs Discussed. Caregiver Distress: No: n/a  Cultural: patient enjoys shopping; she has three children (2 sons and 1 daughter). Has good support with her mother and close  "friends.   **Primary  to Follow**  Palliative Care  Consult: No    Spiritual:  F - Marilyn and Belief:  Latter-day - Rastafari  I - Importance:  Yes  C - Community:  Has   A - Address in Care:  Palliative  involved        Advance Care Planning  Advance Directives:   Living Will: No    [place]POST: No    Do Not Resuscitate Status: No    Medical Power of : No        Oral Declaration: Yes  Agent's Name:  Star Mckinnon   Agent's Contact Number:  [phone]    Decision Making:  Patient answered questions  Goals of Care: What is most important right now is to focus on remaining as independent as possible, symptom/pain control, extending life as long as possible, even it it means sacrificing quality. Accordingly, we have decided that the best plan to meet the patient's goals includes continuing with treatment.         Significant Labs: All pertinent labs within the past 24 hours have been reviewed.  CBC:   Recent Labs   Lab 04/29/24 0358   WBC 5.56   HGB 9.6*   HCT 29.7*   MCV 94        BMP:  Recent Labs   Lab 04/29/24 0358   GLU 97      K 3.8   *   CO2 17*   BUN 54*   CREATININE 1.4   CALCIUM 10.4   MG 2.1     LFT:  Lab Results   Component Value Date    AST 25 04/29/2024    ALKPHOS 102 04/29/2024    BILITOT 0.6 04/29/2024     Albumin:   Albumin   Date Value Ref Range Status   04/29/2024 2.1 (L) 3.5 - 5.2 g/dL Final     Protein:   Total Protein   Date Value Ref Range Status   04/29/2024 6.1 6.0 - 8.4 g/dL Final     Lactic acid:   Lab Results   Component Value Date    LACTATE 0.9 04/27/2024    LACTATE 1.4 04/26/2024       Significant Imaging: I have reviewed all pertinent imaging results/findings within the past 24 hours.    04/20/2024 US retroperitoneal: "Moderate bilateral hydronephrosis, corresponding to findings on prior CT performed 03/11/2024. At least 1 nonobstructing right renal calculus is noted.  Additional smaller no calculi seen on prior CT are " "not well characterized. Nonspecific increased attenuation dependently within the urinary bladder.  No definite internal vascularity on single provided image.  Findings are nonspecific and could relate to debris and/or hemorrhage.  Soft tissue mass would be difficult to exclude on limited images.  Cross-sectional imaging could be considered for further characterization."     I spent a total of 50 minutes on the day of the visit. This includes face to face time in discussion of goals of care, symptom assessment, coordination of care and emotional support. This also includes non-face to face time preparing to see the patient (eg, review of tests/imaging), obtaining and/or reviewing separately obtained history, documenting clinical information in the electronic or other health record, independently interpreting results and communicating results to the patient/family/caregiver, or care coordinator.     Raquel Vicente MD  Palliative Medicine  [facility] - Oncology ([facility])                "

## 2024-04-29 NOTE — PLAN OF CARE
Plan of care reviewed with pt. Pt with c/o pain and anxiety, prn med administered. Pt with multiple loose BMs overnight, prn imodium administered. Pt tachy overnight. Pt free from falls or injuries; no acute events so far this shift. Pt in bed with non-skid socks on, bed locked and in lowest position, side rails up x2, call light and personal items within reach. Bed alarm set. Pt instructed to call for assistance as needed.

## 2024-04-29 NOTE — ASSESSMENT & PLAN NOTE
C/f possible Ileus on Xray Abdomen. Placed on CLD and advanced to full in MICU. Patient having bowel movements, active bowel sounds, no abdominal pain. Appears to be resolved.    - consider NGT if worsening abdominal exam/pain and not passing gas/stools

## 2024-04-29 NOTE — SUBJECTIVE & OBJECTIVE
Interval History: Tachycardic, anxious overnight. Feeling SOB and desaturating requiring 5L NC overnight, weaned to 3.5L this morning, suspect components of undiagnosed ROSE MARY and known anxiety contributing. Pt with 6 loose BM overnight, cdiff pending. Pain poorly controlled, resuming scheduled and PRN Oxycodone, palliative continuing to follow. PT/OT recommending high intensity, PM&R consulted for rehab assessment.    Oncology Treatment Plan:   [No matching plan found]    Medications:  Continuous Infusions:  Current Facility-Administered Medications   Medication Dose Route Frequency Last Rate Last Admin     Scheduled Meds:  Current Facility-Administered Medications   Medication Dose Route Frequency    buPROPion  150 mg Oral Daily    cinacalcet  30 mg Oral Daily with breakfast    famotidine  20 mg Oral Daily    gabapentin  300 mg Oral QHS    heparin (porcine)  7,500 Units Subcutaneous Q8H    levothyroxine  350 mcg Oral Before breakfast    oxyCODONE  20 mg Oral Q12H    predniSONE  5 mg Oral Daily     PRN Meds:  Current Facility-Administered Medications:     calcium carbonate, 1,000 mg, Oral, TID PRN    dextrose 10%, 12.5 g, Intravenous, PRN    dextrose 10%, 25 g, Intravenous, PRN    dicyclomine, 20 mg, Oral, BID PRN    glucagon (human recombinant), 1 mg, Intramuscular, PRN    glucose, 16 g, Oral, PRN    glucose, 24 g, Oral, PRN    hydrOXYzine HCL, 25 mg, Oral, TID PRN    lactulose, 10 g, Oral, Q6H PRN    loperamide, 2 mg, Oral, QID PRN    LORazepam, 1 mg, Oral, Q12H PRN    naloxone, 0.02 mg, Intravenous, PRN    ondansetron, 4 mg, Oral, Q8H PRN    ondansetron, 4 mg, Intravenous, Daily PRN    oxyCODONE, 10 mg, Oral, Q4H PRN    prochlorperazine, 10 mg, Oral, Q6H PRN    sodium chloride 0.9%, 10 mL, Intravenous, Q12H PRN     Review of Systems  Objective:     Vital Signs (Most Recent):  Temp: 97.6 °F (36.4 °C) (04/29/24 0801)  Pulse: (!) 117 (04/29/24 0801)  Resp: 18 (04/29/24 1004)  BP: 130/73 (04/29/24 0801)  SpO2: 97 %  (04/29/24 0801) Vital Signs (24h Range):  Temp:  [97.6 °F (36.4 °C)-99 °F (37.2 °C)] 97.6 °F (36.4 °C)  Pulse:  [115-128] 117  Resp:  [17-23] 18  SpO2:  [88 %-98 %] 97 %  BP: (117-142)/(66-78) 130/73     Weight: (!) 162 kg (357 lb 2.3 oz)  Body mass index is 57.64 kg/m².  Body surface area is 2.75 meters squared.      Intake/Output Summary (Last 24 hours) at 4/29/2024 1115  Last data filed at 4/29/2024 0353  Gross per 24 hour   Intake 360 ml   Output 1640 ml   Net -1280 ml        Physical Exam  Vitals reviewed.   Constitutional:       General: She is not in acute distress.     Appearance: She is ill-appearing. She is not toxic-appearing or diaphoretic.   HENT:      Head: Normocephalic and atraumatic.      Right Ear: External ear normal.      Left Ear: External ear normal.      Nose: Nose normal.      Mouth/Throat:      Mouth: Mucous membranes are moist.   Eyes:      General: No scleral icterus.        Right eye: No discharge.         Left eye: No discharge.      Extraocular Movements: Extraocular movements intact.   Neck:      Comments: Trachea midline  Cardiovascular:      Rate and Rhythm: Tachycardia present.      Pulses: Normal pulses.      Heart sounds: Normal heart sounds.   Pulmonary:      Effort: Pulmonary effort is normal. No respiratory distress.   Abdominal:      General: Bowel sounds are normal. There is distension.      Palpations: Abdomen is soft.      Tenderness: There is no abdominal tenderness.      Comments: Bilateral nephrostomies in place dressing c/d/i   Musculoskeletal:         General: No deformity or signs of injury.      Cervical back: Normal range of motion.      Right lower leg: Edema present.      Left lower leg: Edema present.   Skin:     General: Skin is warm and dry.      Coloration: Skin is not jaundiced.      Findings: No rash.   Neurological:      General: No focal deficit present.      Mental Status: She is alert and oriented to person, place, and time.      Cranial Nerves: No  cranial nerve deficit.   Psychiatric:         Behavior: Behavior normal.         Judgment: Judgment normal.          Significant Labs:   All pertinent labs from the last 24 hours have been reviewed.    Diagnostic Results:  I have reviewed all pertinent imaging results/findings within the past 24 hours.

## 2024-04-29 NOTE — ASSESSMENT & PLAN NOTE
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 Pain improving with adjustments made to regimen per palliative.  - S/p palliative radiation to L iliopsoas      Plan:  - palliative medicine following, pain medications discontinued in MICU 2/2 concern for causing confusion  - START oxycodone to 10 mg q4h prn  - START Oxycontin 20 mg BID  - prednisone 5 mg daily for inflammatory pain given muscle involvement  - gabapentin to 300 mg QHS

## 2024-04-29 NOTE — PROGRESS NOTES
[facility] - Oncology ([facility])  Hematology/Oncology  Progress Note    Patient Name: Gail Mckinnon  Admission Date: 4/19/2024  Hospital Length of Stay: 9 days  Code Status: Full Code     Subjective:     HPI:  54F with sigmoid cancer (patient of Dr. Pimentel, has progressed through multiple therapies and recently on single agent Debo) with evidence of disease progression and active OP plans to start fruquintinib who presented to the ED with hip pain. Vitals stable, labs show MARILOU. CT shows tumor involvement at left hip illiopsoas which may be contributing to pain. Patient admitted for pain management and MARILOU.     Interval History: Tachycardic, anxious overnight. Feeling SOB and desaturating requiring 5L NC overnight, weaned to 3.5L this morning, suspect components of undiagnosed ROSE MARY and known anxiety contributing. Pt with 6 loose BM overnight, cdiff pending. Pain poorly controlled, resuming scheduled and PRN Oxycodone, palliative continuing to follow. PT/OT recommending high intensity, PM&R consulted for rehab assessment.    Oncology Treatment Plan:   [No matching plan found]    Medications:  Continuous Infusions:  Current Facility-Administered Medications   Medication Dose Route Frequency Last Rate Last Admin     Scheduled Meds:  Current Facility-Administered Medications   Medication Dose Route Frequency    buPROPion  150 mg Oral Daily    cinacalcet  30 mg Oral Daily with breakfast    famotidine  20 mg Oral Daily    gabapentin  300 mg Oral QHS    heparin (porcine)  7,500 Units Subcutaneous Q8H    levothyroxine  350 mcg Oral Before breakfast    oxyCODONE  20 mg Oral Q12H    predniSONE  5 mg Oral Daily     PRN Meds:  Current Facility-Administered Medications:     calcium carbonate, 1,000 mg, Oral, TID PRN    dextrose 10%, 12.5 g, Intravenous, PRN    dextrose 10%, 25 g, Intravenous, PRN    dicyclomine, 20 mg, Oral, BID PRN    glucagon (human recombinant), 1 mg, Intramuscular, PRN    glucose, 16 g, Oral, PRN     glucose, 24 g, Oral, PRN    hydrOXYzine HCL, 25 mg, Oral, TID PRN    lactulose, 10 g, Oral, Q6H PRN    loperamide, 2 mg, Oral, QID PRN    LORazepam, 1 mg, Oral, Q12H PRN    naloxone, 0.02 mg, Intravenous, PRN    ondansetron, 4 mg, Oral, Q8H PRN    ondansetron, 4 mg, Intravenous, Daily PRN    oxyCODONE, 10 mg, Oral, Q4H PRN    prochlorperazine, 10 mg, Oral, Q6H PRN    sodium chloride 0.9%, 10 mL, Intravenous, Q12H PRN     Review of Systems  Objective:     Vital Signs (Most Recent):  Temp: 97.6 °F (36.4 °C) (04/29/24 0801)  Pulse: (!) 117 (04/29/24 0801)  Resp: 18 (04/29/24 1004)  BP: 130/73 (04/29/24 0801)  SpO2: 97 % (04/29/24 0801) Vital Signs (24h Range):  Temp:  [97.6 °F (36.4 °C)-99 °F (37.2 °C)] 97.6 °F (36.4 °C)  Pulse:  [115-128] 117  Resp:  [17-23] 18  SpO2:  [88 %-98 %] 97 %  BP: (117-142)/(66-78) 130/73     Weight: (!) 162 kg (357 lb 2.3 oz)  Body mass index is 57.64 kg/m².  Body surface area is 2.75 meters squared.      Intake/Output Summary (Last 24 hours) at 4/29/2024 1115  Last data filed at 4/29/2024 0353  Gross per 24 hour   Intake 360 ml   Output 1640 ml   Net -1280 ml        Physical Exam  Vitals reviewed.   Constitutional:       General: She is not in acute distress.     Appearance: She is ill-appearing. She is not toxic-appearing or diaphoretic.   HENT:      Head: Normocephalic and atraumatic.      Right Ear: External ear normal.      Left Ear: External ear normal.      Nose: Nose normal.      Mouth/Throat:      Mouth: Mucous membranes are moist.   Eyes:      General: No scleral icterus.        Right eye: No discharge.         Left eye: No discharge.      Extraocular Movements: Extraocular movements intact.   Neck:      Comments: Trachea midline  Cardiovascular:      Rate and Rhythm: Tachycardia present.      Pulses: Normal pulses.      Heart sounds: Normal heart sounds.   Pulmonary:      Effort: Pulmonary effort is normal. No respiratory distress.   Abdominal:      General: Bowel sounds are  normal. There is distension.      Palpations: Abdomen is soft.      Tenderness: There is no abdominal tenderness.      Comments: Bilateral nephrostomies in place dressing c/d/i   Musculoskeletal:         General: No deformity or signs of injury.      Cervical back: Normal range of motion.      Right lower leg: Edema present.      Left lower leg: Edema present.   Skin:     General: Skin is warm and dry.      Coloration: Skin is not jaundiced.      Findings: No rash.   Neurological:      General: No focal deficit present.      Mental Status: She is alert and oriented to person, place, and time.      Cranial Nerves: No cranial nerve deficit.   Psychiatric:         Behavior: Behavior normal.         Judgment: Judgment normal.          Significant Labs:   All pertinent labs from the last 24 hours have been reviewed.    Diagnostic Results:  I have reviewed all pertinent imaging results/findings within the past 24 hours.  Assessment/Plan:     * Acute hypoxemic respiratory failure  Patient with Hypoxic Respiratory failure which is Acute.  she is not on home oxygen. Supplemental oxygen was provided and noted-      .   Signs/symptoms of respiratory failure include- tachypnea and respiratory distress. Contributing diagnoses includes -  Volume overload  Labs and images were reviewed. Patient Has not had a recent ABG. Will treat underlying causes and adjust management of respiratory failure as follows-     Pt received 240mg IV Lasix and was ultimately transferred to MICU and placed on HFNC. Subsequently weaned to RA. Stepped down, requiring supplemental O2 overnight. Suspect component of ROSE MARY/anxiety contributing to night time O2 requirement    - Wean O2 as tolerated    Ileus  C/f possible Ileus on Xray Abdomen. Placed on CLD and advanced to full in MICU. Patient having bowel movements, active bowel sounds, no abdominal pain. Appears to be resolved.    - consider NGT if worsening abdominal exam/pain and not passing  gas/stools    Acute metabolic encephalopathy  Suspect component of opioids, uremia, pain, hospital delirium.     - Adding back pain regimen gradually  - MARILOU, uremia resolving    MARILOU (acute kidney injury)  Pt reports decreased urine output and decreased PO intake    Creatinine 2.8 on admit, baseline around 0.9 - 1.0  - Likely pre-renal from dehydration and volume losses superimposed on post renal  - continuing to worsen, Urology unable to stent  - S/p bilateral nephrostomy placement  - Received high dose diuresis for AHRF and c/f volume overload  - Overall downtrending, nephrostomies with good output      Results:  US retroperitoneal: Moderate bilateral hydronephrosis, corresponding to findings on prior CT performed 03/11/2024.       Plan:   - S/p bilateral nephrostomy placement, monitor daily CMP  - Qshift nephrostomy flushing  - Strict I&Os and daily weights   - Avoid nephrotoxic agents such as NSAIDs, gadolinium and IV radiocontrast.  - Renally dose meds to current GFR.  - Maintain MAP > 65.        Hip pain  - CT evidence of tumor involvement at left hip illiopsoas which may be contributing to pain. Pain improving with adjustments made to regimen per palliative.  - S/p palliative radiation to L iliopsoas      Plan:  - palliative medicine following, pain medications discontinued in MICU 2/2 concern for causing confusion  - START oxycodone to 10 mg q4h prn  - START Oxycontin 20 mg BID  - prednisone 5 mg daily for inflammatory pain given muscle involvement  - gabapentin to 300 mg QHS    Hypothyroidism  - continue home synthroid     Diarrhea  Likely 2/2 radiation however cannot rule out infectious etiology at this time    - f/u Cdiff    Other constipation  Multiple BM overnight, DC miralax/senna-doc    Anxiety  - home bupropion     Malignant neoplasm of sigmoid colon  Patient of Dr. Pimentel, has progressed through multiple therapies and recently on single agent Debo) with evidence of disease progression and active OP  plans to start fruquintinib              Dimitry Carrasco MD  Hematology/Oncology  [facility] - Oncology ([facility])       Skin normal color for race, warm, dry and intact. No evidence of rash.

## 2024-04-29 NOTE — PLAN OF CARE
Pt aaox4.  Bed in low and locked position.  Nonskid socks in use.  Call bell, phone, food, drink within reach in bed or on bedside table.  Mother, daughter and other family at bedside and active in care.  All aware to call if needing assistance.  Pain controlled with po scheduled oxycodone Q12hrs and prn po oxy - given once this shift.  R & L neph tubes with clear yellow urine.  Ordered to flush with 10cc once a shift.  PT/OT ordered.  2-3 max assist to turn in bed.  Stool sent this afternoon for cdiff.  Not eating much of diet.  4LNC in use.  Tele tachy 110s.  Abdomen distended.  +3 edema noted.  RSC port access due to change Wednesday.  Echo done at bedside.  See flowsheet for full assessment and details.

## 2024-04-29 NOTE — ASSESSMENT & PLAN NOTE
Pt reports decreased urine output and decreased PO intake    Creatinine 2.8 on admit, baseline around 0.9 - 1.0  - Likely pre-renal from dehydration and volume losses superimposed on post renal  - continuing to worsen, Urology unable to stent  - S/p bilateral nephrostomy placement  - Received high dose diuresis for AHRF and c/f volume overload  - Overall downtrending, nephrostomies with good output      Results:  US retroperitoneal: Moderate bilateral hydronephrosis, corresponding to findings on prior CT performed 03/11/2024.       Plan:   - S/p bilateral nephrostomy placement, monitor daily CMP  - Qshift nephrostomy flushing  - Strict I&Os and daily weights   - Avoid nephrotoxic agents such as NSAIDs, gadolinium and IV radiocontrast.  - Renally dose meds to current GFR.  - Maintain MAP > 65.

## 2024-04-29 NOTE — CARE UPDATE
Visited Ms. Mckinnon again, got to meet her daughter Radha in person at bedside. Ms. Mckinnon appeared more calm. I asked her if she remembered our conversation from this morning and she said yes, started getting emotional again about her terrifying ICU experience. Normalized that unfortunately, many people who are ill in the hospital can experience these frightening episodes - validating how extremely terrifying and real these are. I shared that I will add a medicine as needed if tonight, something similar happens again, noting that when enter these fight-or-flight modes during threatening events, sometimes we will use medicine to help calm us and will give at nighttime since it can be sedating. Will be adding risperidone ODT 2 mg PO qHS PRN severe agitation. Shared with Radha and Gail that I will check in again tomorrow morning.

## 2024-04-29 NOTE — ASSESSMENT & PLAN NOTE
Suspect component of opioids, uremia, pain, hospital delirium.     - Adding back pain regimen gradually  - MARILOU, uremia resolving

## 2024-04-29 NOTE — NURSING TRANSFER
Nursing Transfer Note      4/28/2024   7:22 PM    Nurse giving handoff:Jarett  Nurse receiving handoff:Alexus    Reason patient is being transferred: Stepdown orders    Transfer To: [id number]    Transfer via bed    Transfer with cardiac monitoring    Transported by Jarett/Noam PCT    Transfer Vital Signs:  Blood Pressure:134/68  Heart Rate:116  O2:98%  Temperature:97.7  Respirations:20    Telemetry: Rhythm NSR  Order for Tele Monitor? Yes    Additional Lines: Bilateral Nephrostomy tubes  4eyes on Skin: yes    Medicines sent: NA    Any special needs or follow-up needed: Pain management    Patient belongings transferred with patient: Yes    Chart send with patient: Yes    Notified: daughter    Patient reassessed at: 04/28/24@18:25  1  Upon arrival to floor: cardiac monitor applied, patient oriented to room, call bell in reach, and bed in lowest position

## 2024-04-29 NOTE — SUBJECTIVE & OBJECTIVE
Interval History: Confused this morning, mother at bedside. Spoke with daughter on speaker phone who was able to listen to my conversation with Ms. Mckinnon..    Prior 24 hour MME: 30 MME  Oxycontin 10 mg BID x 2    Medications:  Continuous Infusions:  Current Facility-Administered Medications   Medication Dose Route Frequency Last Rate Last Admin     Scheduled Meds:  Current Facility-Administered Medications   Medication Dose Route Frequency    buPROPion  150 mg Oral Daily    cinacalcet  30 mg Oral Daily with breakfast    famotidine  20 mg Oral Daily    gabapentin  300 mg Oral QHS    heparin (porcine)  7,500 Units Subcutaneous Q8H    levothyroxine  350 mcg Oral Before breakfast    oxyCODONE  20 mg Oral Q12H    predniSONE  5 mg Oral Daily     PRN Meds:  Current Facility-Administered Medications:     calcium carbonate, 1,000 mg, Oral, TID PRN    dextrose 10%, 12.5 g, Intravenous, PRN    dextrose 10%, 25 g, Intravenous, PRN    dicyclomine, 20 mg, Oral, BID PRN    glucagon (human recombinant), 1 mg, Intramuscular, PRN    glucose, 16 g, Oral, PRN    glucose, 24 g, Oral, PRN    hydrOXYzine HCL, 25 mg, Oral, TID PRN    lactulose, 10 g, Oral, Q6H PRN    loperamide, 2 mg, Oral, QID PRN    LORazepam, 1 mg, Oral, Q12H PRN    naloxone, 0.02 mg, Intravenous, PRN    ondansetron, 4 mg, Oral, Q8H PRN    ondansetron, 4 mg, Intravenous, Daily PRN    oxyCODONE, 10 mg, Oral, Q4H PRN    prochlorperazine, 10 mg, Oral, Q6H PRN    sodium chloride 0.9%, 10 mL, Intravenous, Q12H PRN    Objective:     Vital Signs (Most Recent):  Temp: 97.5 °F (36.4 °C) (04/29/24 1144)  Pulse: (!) 118 (04/29/24 1144)  Resp: 18 (04/29/24 1144)  BP: 126/69 (04/29/24 1144)  SpO2: (!) 93 % (04/29/24 1144) Vital Signs (24h Range):  Temp:  [97.5 °F (36.4 °C)-99 °F (37.2 °C)] 97.5 °F (36.4 °C)  Pulse:  [116-128] 118  Resp:  [18-23] 18  SpO2:  [88 %-98 %] 93 %  BP: (117-142)/(66-78) 126/69     Weight: (!) 162 kg (357 lb 2.3 oz)  Body mass index is 55.94 kg/m².        Physical Exam  Vitals reviewed.   Constitutional:       Appearance: She is not toxic-appearing.   HENT:      Head: Normocephalic and atraumatic.      Right Ear: External ear normal.      Left Ear: External ear normal.      Nose: Nose normal.      Mouth/Throat:      Mouth: Mucous membranes are moist.   Eyes:      Extraocular Movements: Extraocular movements intact.      Conjunctiva/sclera: Conjunctivae normal.   Neck:      Comments: Trachea midline  Cardiovascular:      Rate and Rhythm: Normal rate.   Pulmonary:      Effort: Pulmonary effort is normal. No respiratory distress.   Abdominal:      Palpations: Abdomen is soft.   Musculoskeletal:      Cervical back: Normal range of motion.   Neurological:      General: No focal deficit present.      Mental Status: She is alert. She is disoriented.   Psychiatric:         Mood and Affect: Mood is anxious and depressed. Affect is tearful.         Behavior: Behavior normal.         Judgment: Judgment normal.            Review of Symptoms      Symptom Assessment (ESAS 0-10 Scale)  Unable to complete assessment due to Mental status change     CAM / Delirium:  Negative  Constipation:  Positive  Diarrhea:  Negative      Bowel Management Plan (BMP):  Yes      Pain Assessment:  OME in 24 hours:  125  Location(s): leg    Leg       Location: left        Quality: Aching, burning, sharp and pressure-like        Quantity: 8/10 in intensity        Chronicity: Onset 1 (greater than) week(s) ago, unchanged        Aggravating Factors: Activity and walking        Alleviating Factors: Opiates        Associated Symptoms: None    Pain Assessment in Advanced Demential Scale (PAINAD)   Breathing - Independent of vocalization:  0  Negative vocalization:  0  Facial expression:  1  Body language:  0  Consolability:  1  Total:  2    Modified Ramona Scale:  0    Living Arrangements:  Lives with family and Lives in home    Psychosocial/Cultural:   See Palliative Psychosocial Note: No  Social Issues  Identified: Coping deficit pt/family and Mental Health  Bereavement Risk: No  Caregiver Needs Discussed. Caregiver Distress: No: n/a  Cultural: patient enjoys shopping; she has three children (2 sons and 1 daughter). Has good support with her mother and close friends.   **Primary  to Follow**  Palliative Care  Consult: No    Spiritual:  F - Marilyn and Belief:  Taoist - Episcopal  I - Importance:  Yes  C - Community:  Has   A - Address in Care:  Palliative  involved        Advance Care Planning   Advance Directives:   Living Will: No    [place]POST: No    Do Not Resuscitate Status: No    Medical Power of : No        Oral Declaration: Yes  Agent's Name:  Star Mckinnon   Agent's Contact Number:  [phone]    Decision Making:  Patient answered questions  Goals of Care: What is most important right now is to focus on remaining as independent as possible, symptom/pain control, extending life as long as possible, even it it means sacrificing quality. Accordingly, we have decided that the best plan to meet the patient's goals includes continuing with treatment.         Significant Labs: All pertinent labs within the past 24 hours have been reviewed.  CBC:   Recent Labs   Lab 04/29/24 0358   WBC 5.56   HGB 9.6*   HCT 29.7*   MCV 94        BMP:  Recent Labs   Lab 04/29/24 0358   GLU 97      K 3.8   *   CO2 17*   BUN 54*   CREATININE 1.4   CALCIUM 10.4   MG 2.1     LFT:  Lab Results   Component Value Date    AST 25 04/29/2024    ALKPHOS 102 04/29/2024    BILITOT 0.6 04/29/2024     Albumin:   Albumin   Date Value Ref Range Status   04/29/2024 2.1 (L) 3.5 - 5.2 g/dL Final     Protein:   Total Protein   Date Value Ref Range Status   04/29/2024 6.1 6.0 - 8.4 g/dL Final     Lactic acid:   Lab Results   Component Value Date    LACTATE 0.9 04/27/2024    LACTATE 1.4 04/26/2024       Significant Imaging: I have reviewed all pertinent imaging results/findings within  "the past 24 hours.    04/20/2024 US retroperitoneal: "Moderate bilateral hydronephrosis, corresponding to findings on prior CT performed 03/11/2024. At least 1 nonobstructing right renal calculus is noted.  Additional smaller no calculi seen on prior CT are not well characterized. Nonspecific increased attenuation dependently within the urinary bladder.  No definite internal vascularity on single provided image.  Findings are nonspecific and could relate to debris and/or hemorrhage.  Soft tissue mass would be difficult to exclude on limited images.  Cross-sectional imaging could be considered for further characterization."   "

## 2024-04-30 NOTE — PROGRESS NOTES
[facility] - Oncology ([facility])  Palliative Medicine  Progress Note    Patient Name: Gail Mckinnon  MRN: [id number]  Admission Date: 4/19/2024  Hospital Length of Stay: 10 days  Code Status: Full Code   Attending Provider: Frances Luong MD  Consulting Provider: Raquel Vicente MD  Primary Care Physician: Marah Santo MD  Principal Problem:Acute hypoxemic respiratory failure    Patient information was obtained from patient, relative(s), and primary team.      Assessment/Plan:     Palliative Care  Palliative care encounter  Ms. 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 Of note, patient's recent imaging showed evidence of progression. Outpatient plan to start fruquintinib. In ED patient noted to have MARILOU. Imaging showed tumor involvement at left hip iliopsoas. S/p attempt for bilateral ureteral stents that was unsuccessful, followed by IR-guided bilateral nephrostomy tube placements for hydronephrosis on 4/22/24. Stepped up to the MICU on 4/26 for acute respiratory failure, stepped down on 4/28 for resolution of respiratory distress. Palliative Medicine consulted for malignant symptom management.    Sigmoid cancer/MARILOU/bilateral hydronephrosis/HTN/thyroid problem/other medical problems  - plan per primary team and other speciality consultants (Urology)    GOC/ACP  - patient decisional and by herself in room  - no ACP documents uploaded into EMR  - patient follows with Palliative Medicine NP Claudio and Dr. Hill on [facility] already; next appt: 04/23/24   - On discharge, I will contact outpatient palliative medicine team on [place] to arrange new follow up appointment  - NOK: patient's children  - On 04/20/2024 patient verbally stated she would want her middle son, Star Mckinnon, to be decision maker at [phone]  - will follow up at future visits to fill out ACP forms  -  goals: improvement in symptoms to be able to go on cruise on Thursday 4/25/24 with friends; also continue life prolonging measures  - code status not discussed    Cancer related pain  - patient reporting severe pain in left hip; she also has pain at times in abdomen/groin and acute tenderness from new bilateral nephrostomy tube placements  - 24 hour OME: 30 MME (had been consistently requiring >200 MME/day prior to decompensation)  - outpatient regimen: oxycodone-apap  mg q4h prn, gabapentin 300 mg qhs, and flexeril 5 mg q8h prn. This was not providing any relief.  - Continue Oxycontin 20 mg PO BID  - Continue oxycodone 10 mg PO q4h PRN severe pain  - If needing an agent for acute pain relief, consider hydromorphone 0.5 mg IV q4h PRN breakthrough pain. She did do well with 1 mg previously  - Psych/onc consulted for anxiety component driving uncontrolled pain  - Rad/onc engaged, performed simulation on 4/23  - Continue gabapentin 300 mg PO qHS (unable to increase due to creatinine clearance)    Nausea/Anorexia  - patient with increased nausea due to increased pain  - patient with poor appetite constantly  - would benefit from nutrition consult while in the hospital  - continue zofran 4 mg q8h prn and prochlorperazine 10 mg q6h prn    Constipation  - Bowel regimen being appropriately withheld due to multiple loose stools (last 4/29)  - uses senna at home with some relief    Anxiety/depression/spiritual distress  - patient feeling very overwhelmed with news of progression  - 4/21/24: patient spoke of possible urologic surgery (stents vs nephrostomy tubes). Patient feeling very overwhelmed by news of additional issues. Patient upset with ongoing bad news as well as possibility of not being able to make trip with her friends. Patient spoke about how she feels that this is her last trip she will be able to make, and she wants to be able to enjoy herself and the time she has left.   - she reports not feeling at peace  spiritually  - 4/23/24: Emotional support provided in context of her malignancy and the barriers it has created in living her life (like attending cruise with friends this Thursday 4/25/24) and her understandable fears related to perception from others related to opioids for appropriate treatment of neoplasm-related pain.  - continue home buproprion  - resumed home lorazepam 1 mg PO BID PRN anxiety  - Psych/onc consulted    Acute encephalopathy  - Likely multifactorial, considering dramatic decrease in opioid administration, initiated when she acutely decompensated for acute respiratory failure  - Content of speech is very paranoid, this is not her baseline  - QTc from 3/11/24 is normal (425 ms)  - Renal function markedly recovering  - Pain and anxiety management as above  - Risperidone 2 mg PO qHS PRN severe agitation, paranoia        I will follow-up with patient. Please contact us if you have any additional questions.    Subjective:     Chief Complaint:   Chief Complaint   Patient presents with    Leg Pain     Left upper thigh pain and swelling. Difficulty ambulating. Denies trauma. On chemo for metastatic colon cancer.         HPI:   Ms. 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 Of note, patient's recent imaging showed evidence of progression. Outpatient plan to start fruquintinib. In ED patient noted to have MARILOU. Imaging showed tumor involvement at left hip iliopsoas. Patient admitted for pain management and MARILOU. Palliative Medicine consulted for symptom management.    Hospital Course:  No notes on file    Interval History: Distressed about having bowel movement. Was getting upset, telling me that others have been forbidding her to even use bed pan. Thankfully, RN came in and graciously called in another to help her get on bed pan in anticipation of a bowel movement.    Prior 24 hour MME:  105 MME  Oxycontin 20 mg BID x 2 = 60 MME  Oxycodone 10 mg x 3 = 45 MME    Medications:  Continuous Infusions:  Current Facility-Administered Medications   Medication Dose Route Frequency Last Rate Last Admin     Scheduled Meds:  Current Facility-Administered Medications   Medication Dose Route Frequency    buPROPion  150 mg Oral Daily    cinacalcet  30 mg Oral Daily with breakfast    famotidine  20 mg Oral Daily    gabapentin  300 mg Oral QHS    heparin (porcine)  7,500 Units Subcutaneous Q8H    levothyroxine  350 mcg Oral Before breakfast    oxyCODONE  20 mg Oral Q12H    predniSONE  5 mg Oral Daily     PRN Meds:  Current Facility-Administered Medications:     calcium carbonate, 1,000 mg, Oral, TID PRN    dextrose 10%, 12.5 g, Intravenous, PRN    dextrose 10%, 25 g, Intravenous, PRN    dicyclomine, 20 mg, Oral, BID PRN    glucagon (human recombinant), 1 mg, Intramuscular, PRN    glucose, 16 g, Oral, PRN    glucose, 24 g, Oral, PRN    hydrOXYzine HCL, 25 mg, Oral, TID PRN    lactulose, 10 g, Oral, Q6H PRN    loperamide, 2 mg, Oral, QID PRN    LORazepam, 1 mg, Oral, Q12H PRN    naloxone, 0.02 mg, Intravenous, PRN    ondansetron, 4 mg, Oral, Q8H PRN    ondansetron, 4 mg, Intravenous, Daily PRN    oxyCODONE, 10 mg, Oral, Q4H PRN    prochlorperazine, 10 mg, Oral, Q6H PRN    risperiDONE, 2 mg, Oral, Nightly PRN    sodium chloride 0.9%, 10 mL, Intravenous, Q12H PRN    Objective:     Vital Signs (Most Recent):  Temp: 98.1 °F (36.7 °C) (04/30/24 0749)  Pulse: (!) 120 (04/30/24 0749)  Resp: 18 (04/30/24 0749)  BP: 131/77 (04/30/24 0749)  SpO2: (!) 93 % (04/30/24 0749) Vital Signs (24h Range):  Temp:  [97.5 °F (36.4 °C)-98.2 °F (36.8 °C)] 98.1 °F (36.7 °C)  Pulse:  [109-120] 120  Resp:  [18-20] 18  SpO2:  [92 %-96 %] 93 %  BP: (119-137)/(63-77) 131/77     Weight: (!) 162 kg (357 lb 2.3 oz)  Body mass index is 55.94 kg/m².       Physical Exam  Vitals reviewed.   Constitutional:       Appearance: She is not toxic-appearing.    HENT:      Head: Normocephalic and atraumatic.      Right Ear: External ear normal.      Left Ear: External ear normal.      Nose: Nose normal.      Mouth/Throat:      Mouth: Mucous membranes are moist.   Eyes:      Extraocular Movements: Extraocular movements intact.      Conjunctiva/sclera: Conjunctivae normal.   Neck:      Comments: Trachea midline  Cardiovascular:      Rate and Rhythm: Normal rate.   Pulmonary:      Effort: Pulmonary effort is normal. No respiratory distress.   Abdominal:      Palpations: Abdomen is soft.   Musculoskeletal:      Cervical back: Normal range of motion.   Neurological:      General: No focal deficit present.      Mental Status: She is alert. She is disoriented.   Psychiatric:         Mood and Affect: Mood is anxious and depressed. Affect is tearful.         Behavior: Behavior normal.         Judgment: Judgment normal.            Review of Symptoms      Symptom Assessment (ESAS 0-10 Scale)  Pain:  0  Dyspnea:  0  Anxiety:  0  Nausea:  0  Depression:  0  Anorexia:  0  Fatigue:  0  Insomnia:  0  Restlessness:  0  Agitation:  0  Unable to complete assessment due to Mental status change     CAM / Delirium:  Negative  Constipation:  Positive  Diarrhea:  Negative      Bowel Management Plan (BMP):  Yes      Pain Assessment:  OME in 24 hours:  125  Location(s): leg    Leg       Location: left        Quality: Aching, burning, sharp and pressure-like        Quantity: 8/10 in intensity        Chronicity: Onset 1 (greater than) week(s) ago, unchanged        Aggravating Factors: Activity and walking        Alleviating Factors: Opiates        Associated Symptoms: None    Pain Assessment in Advanced Demential Scale (PAINAD)   Breathing - Independent of vocalization:  0  Negative vocalization:  0  Facial expression:  1  Body language:  0  Consolability:  1  Total:  2    Modified Ramona Scale:  0    Living Arrangements:  Lives with family and Lives in home    Psychosocial/Cultural:   See Palliative  Psychosocial Note: No  Social Issues Identified: Coping deficit pt/family and Mental Health  Bereavement Risk: No  Caregiver Needs Discussed. Caregiver Distress: No: n/a  Cultural: patient enjoys shopping; she has three children (2 sons and 1 daughter). Has good support with her mother and close friends.   **Primary  to Follow**  Palliative Care  Consult: No    Spiritual:  F - Marilyn and Belief:  Yarsani - Bahai  I - Importance:  Yes  C - Community:  Has   A - Address in Care:  Palliative  involved        Advance Care Planning  Advance Directives:   Living Will: No    LaPOST: No    Do Not Resuscitate Status: No    Medical Power of : No        Oral Declaration: Yes  Agent's Name:  Star Mckinnon   Agent's Contact Number:  [phone]    Decision Making:  Patient answered questions  Goals of Care: What is most important right now is to focus on remaining as independent as possible, symptom/pain control, extending life as long as possible, even it it means sacrificing quality. Accordingly, we have decided that the best plan to meet the patient's goals includes continuing with treatment.         Significant Labs: All pertinent labs within the past 24 hours have been reviewed.  CBC:   Recent Labs   Lab 04/30/24 0329   WBC 5.56   HGB 9.1*   HCT 29.3*   MCV 95        BMP:  Recent Labs   Lab 04/30/24 0329   GLU 86      K 3.9   *   CO2 19*   BUN 36*   CREATININE 1.1   CALCIUM 10.6*   MG 1.8     LFT:  Lab Results   Component Value Date    AST 41 (H) 04/30/2024    ALKPHOS 108 04/30/2024    BILITOT 0.5 04/30/2024     Albumin:   Albumin   Date Value Ref Range Status   04/30/2024 2.0 (L) 3.5 - 5.2 g/dL Final     Protein:   Total Protein   Date Value Ref Range Status   04/30/2024 6.1 6.0 - 8.4 g/dL Final     Lactic acid:   Lab Results   Component Value Date    LACTATE 0.9 04/27/2024    LACTATE 1.4 04/26/2024       Significant Imaging: I have reviewed all  "pertinent imaging results/findings within the past 24 hours.    04/20/2024 US retroperitoneal: "Moderate bilateral hydronephrosis, corresponding to findings on prior CT performed 03/11/2024. At least 1 nonobstructing right renal calculus is noted.  Additional smaller no calculi seen on prior CT are not well characterized. Nonspecific increased attenuation dependently within the urinary bladder.  No definite internal vascularity on single provided image.  Findings are nonspecific and could relate to debris and/or hemorrhage.  Soft tissue mass would be difficult to exclude on limited images.  Cross-sectional imaging could be considered for further characterization."     I spent a total of 50 minutes on the day of the visit. This includes face to face time in discussion of goals of care, symptom assessment, coordination of care and emotional support. This also includes non-face to face time preparing to see the patient (eg, review of tests/imaging), obtaining and/or reviewing separately obtained history, documenting clinical information in the electronic or other health record, independently interpreting results and communicating results to the patient/family/caregiver, or care coordinator.        Raquel Vicente MD  Palliative Medicine  [facility] - Oncology ([facility])                "

## 2024-04-30 NOTE — PT/OT/SLP PROGRESS
Physical Therapy Treatment    Patient Name:  Gail Mckinnon   MRN:  [id number]    Recommendations:     Discharge Recommendations: High Intensity Therapy  Discharge Equipment Recommendations: walker, rolling, bedside commode  Barriers to discharge:  increased level of skilled assist and evolving clinical presentation    Assessment:     Gail Mckinnon is a 54 y.o. female admitted with a medical diagnosis of Acute hypoxemic respiratory failure.  She presents with the following impairments/functional limitations: weakness, impaired endurance, impaired functional mobility, gait instability, decreased lower extremity function, impaired balance, pain, decreased ROM Patient was pleasantly agreeable to therapy this date, with encouragement from supportive daughter (Radha) who was present throughout the session. She tolerated the session well despite her abdominal pain, consisting of bed mobility, multiple transfers for cleaning, and R lateral steps. Future sessions to focus on transfer to bedside commode safely as appropriate. Patient will continue to benefit from skilled PT during this admit to address BLE strength and endurance deficits, and maximize independence with functional mobility.    Rehab Prognosis: Good; patient would benefit from acute skilled PT services to address these deficits and reach maximum level of function.    Recent Surgery: Procedure(s) (LRB):  CYSTOSCOPY (N/A) 8 Days Post-Op    Plan:     During this hospitalization, patient to be seen 4 x/week to address the identified rehab impairments via gait training, therapeutic activities, therapeutic exercises, neuromuscular re-education and progress toward the following goals:    Plan of Care Expires:  05/26/24    Subjective     Chief Complaint: abdominal pain, bowel movement  Patient/Family Comments/goals: gain strength and independence  Pain/Comfort:  Pain Rating 1: 7/10  Location - Orientation 1: generalized  Location 1: abdomen  Pain  Addressed 1: Distraction, Reposition  Pain Rating Post-Intervention 1: 7/10      Objective:     Communicated with RN prior to session.  Patient found HOB elevated with telemetry, oxygen, nephrostomy upon PT entry to room.     General Precautions: Standard, fall  Orthopedic Precautions: N/A  Braces: N/A  Respiratory Status: Nasal cannula, flow 3 L/min     Functional Mobility:  Bed Mobility:     Rolling Left:  moderate assistance  Rolling Right: maximal assistance  Scooting: moderate assistance, in seated position, anteriorly to plant feet on floor  Supine to Sit: moderate assistance and of 2 persons, increased time and use of bed rail with HOB elevated  Sit to Supine: total assistance and of 2 persons  Transfers:     Sit to Stand:  moderate assistance and of 2 persons with no AD, hand-held assist, and from EOB x2 trials  Gait: ~4 R lateral steps x2 trials with mod x2 and no AD/ B HHA. Poor floor clearance, weight shifts to slide each foot across floor d/t pain and weakness, forward flexed posture with cues for glute activation and upright posture, no LOB noted despite fatigue with increased standing trials  Balance:   Static Sitting EOB: 15 min, SBA  Dynamic Sitting EOB: reaching activities implemented to challenge upright posture and base of support, mild increases in abdominal pain, terminating  Static Standing: mod A x2 with B HHA  Dynamic standing: cues for weight shifting and upright / midline posture      AM-PAC 6 CLICK MOBILITY  Turning over in bed (including adjusting bedclothes, sheets and blankets)?: 2  Sitting down on and standing up from a chair with arms (e.g., wheelchair, bedside commode, etc.): 2  Moving from lying on back to sitting on the side of the bed?: 2  Moving to and from a bed to a chair (including a wheelchair)?: 2  Need to walk in hospital room?: 1  Climbing 3-5 steps with a railing?: 1  Basic Mobility Total Score: 10       Treatment & Education:  Patient educated on calling for assistance  for any needs to improve overall safety awareness.  Patient educated on current level of function and progression towards therapeutic goals.    Patient left HOB elevated with all lines intact, RN notified, dtr present.    GOALS:   Multidisciplinary Problems       Physical Therapy Goals          Problem: Physical Therapy    Goal Priority Disciplines Outcome Goal Variances Interventions   Physical Therapy Goal     PT, PT/OT Progressing     Description: Goals to be met by: 24     Patient will increase functional independence with mobility by performin. Supine to sit with Stand-by Assistance  2. Sit to supine with Stand-by Assistance  3. Sit to stand transfer with Stand-by Assistance  4. Bed to chair transfer with Stand-by Assistance using Rolling Walker or LRAD  5. Gait  x 100 feet with Stand-by Assistance using Rolling Walker.   6. Ascend/descend 2 stair with right or left Handrails Contact Guard Assistance using No Assistive Device.   7. Lower extremity exercise program x15 reps per handout, with assistance as needed                         Time Tracking:     PT Received On: 24  PT Start Time: 1057     PT Stop Time: 1131  PT Total Time (min): 34 min     Billable Minutes: Therapeutic Activity 14 and Neuromuscular Re-education 20    Treatment Type: Evaluation  PT/PTA: PT     Number of PTA visits since last PT visit: 0     2024

## 2024-04-30 NOTE — CONSULTS
[facility] - Oncology (Hospital)  Psychology  Consult Note    Behavioral Health Assessment - CPT 93302    Patient Name: Gail Mckinnon  MRN: [id number]   Patient Class: IP- Inpatient  Admission Date: 4/19/2024  Hospital Length of Stay: 10 days  Attending Physician: Frances Luong MD  Primary Care Provider: Marah Santo MD    Inpatient consult to Hematology/Oncology Psychology  Consult performed by: Freddie Cordero PsyD  Consult ordered by: Dimitry Carrasco MD            History of Present Illness:   Patient was seen for ordered oncology psychology consult for difficulty coping with worsening pain/new nephrostomy tubes. Patient confirmed her identity via two identifiers and agreed to consent to meet with me today. She agreed to limits of confidentiality. Patient confirmed that she has been working with Dr. Gallagher in [place] on coping with cancer.    Pain: Patient rated her pain level today as 10/10 and noted it is in her stomach. She noted pain medication helps reduce pain and that she did not feel like she needed to reach out to medical team about it at this time.    Symptoms:   Mood: Patient noted her mood has not been good recently.  Anxiety: Patient appeared to present with increased anxiety during session while talking about her health concerns.  Substance Abuse: denied  Cognitive Functioning:  Patient noted that she sees that she has been having difficulties with memory.  Health Behaviors:  Difficulty coping with illness    Psychiatric History: currently under psychiatric care including meeting with Dr. Gallagher for psychotherapy for coping with cancer.    Past Medical History:   Diagnosis Date    Anxiety     Depression     FH: ovarian cancer 03/16/2020    Hx of psychiatric care     Effexor, Paxil, Lexapro, Zoloft, Wellbutrin, Trazodone Buspar    Hypertension     Hyperthyroidism     Hypothyroid     Kidney calculi     Malignant neoplasm of sigmoid colon 03/16/2020    Menorrhagia     Multinodular  jackieiter 08/24/2012    Palpitation     Psychiatric problem     Venous insufficiency     Vulvar lesion      Social History (marriage, employment, etc.): Patient noted she lives alone. Patient stated she has one daughter. She noted she has friends who are supports for her.    Substance Use:   Alcohol:  Rare   Drugs: none   Tobacco: none   Caffeine: none    Current Medications and Drug Reactions (include OTC, herbal):   See medication list.     Psychotherapeutics (From admission, onward)      Start     Stop Route Frequency Ordered    04/29/24 1712  risperiDONE disintegrating tablet 2 mg         -- Oral Nightly PRN 04/29/24 1612    04/29/24 1040  LORazepam tablet 1 mg         -- Oral Every 12 hours PRN 04/29/24 0940    04/20/24 0900  buPROPion TB24 tablet 150 mg         -- Oral Daily 04/20/24 0348            Strengths and Liabilities:  Strength: Patient appears to have positive support; Liabilities: Patient appears to have difficulties coping with illness    Current Evaluation:     Mental Status Exam:  General Appearance:  lying in bed   Speech: Patient appeared to have somewhat slowed response.      Level of Cooperation: cooperative for most of consult and then concluded as she requested to conclude due to what appeared to be increased stress      Thought Processes: logical   Mood: anxious      Thought Content: No SI/HI   Affect: anxious, congruent   Orientation: Patient was oriented to person but did noted she thought today was Thursday when it was Tuesday; did not ask about orientation to place   Memory: Patient noted she sees that she has had difficulties with memory   Attention Span & Concentration: Patient appeared to have slight difficulties with focus as a couple of questions had to be repeated     Fund of General Knowledge: Patient was able to name current president of the [place] but not most recent one. It is possible that this is related to memory difficulties   Abstract Reasoning: Did not assess in current session    Judgment & Insight: Fair to good     Language  Somewhat slowed response and patient a couple of times appeared to have difficulties with thinking of a word she wanted to say     Diagnostic Impression - Plan:       ICD-10-CM ICD-9-CM   1. MARILOU (acute kidney injury)  N17.9 584.9   2. Leg pain  M79.606 729.5   3. Chest pain  R07.9 786.50   4. Malignant neoplasm of sigmoid colon [C18.7]  C18.7 153.3   5. Renal stones  N20.0 592.0   6. Hydronephrosis, bilateral  N13.30 591   7. Pain of left hip  M25.552 719.45   8. Tachycardia  R00.0 785.0   9. Acute hypoxemic respiratory failure [J96.01]  J96.01 518.81   10. Anxiety [F41.9]  F41.9 300.00        Psychiatric  Anxiety  Patient appeared to present with anxiety during the session while discussing her health difficulties. Patient was taught visual imagery exercise for relaxation and she practiced it during the session. Patient noted she imagined herself at the beach and that it helped somewhat with relaxation. Patient was encouraged to practice this skill on her own and to continuing engaging with Dr. Gallagher once she is discharged from hospital. Patient request to conclude consult which appeared to be related to patient's increased anxiety so I concluded the consult with her per her request. Patient declined for me to check-in with her again during hospital stay and she noted she would reach out as needed. I provided patient with my clinic contact information and encouraged her to let her inpatient medical team know if she wants to request further psychological services. Patient expressed appreciation for me meeting with her.    Oncology  Malignant neoplasm of sigmoid colon  Patient appears to have anxiety regarding her diagnosis and treatment course. Patient would likely benefit from continued engagement with her therapist Dr. Gallagher. Patient was encouraged to continue engaging with Dr. Gallagher and patient noted they have been working on coping with cancer.        Length of  Service (minutes):  20    Freddie Cordero PsyD  Psychology  [facility] - [facility] ([facility])

## 2024-04-30 NOTE — CHAPLAIN
Palliative Care     Patient: Gail Mckinnon  MRN: [id number]  : 1970  Age: 54 y.o.  Hospital Length of Stay: 10  Code Status: Code Status Discussion Note  Attending Provider: Frances Luong MD  Principal Problem: Acute hypoxemic respiratory failure    Non-clinical observations of patient/room: Visited pall med pt again today with daughter, Radha, bedside; pt was stepped down recently; pt awake, communicative, grimacing in pain; 10 min visit    Provided compassionate presence and listening ear as pt shared about her pain and being stepped down recently; held discission with daughter as well who will be heading back to work this afternoon, but has some flexibility so she can be by her mom's side as much as possible.     I was unable to engage in a spiritual assessment (she is Rastafarian and welcomed me to pray last week when I visited), because she abruptly asked me to leave and I honored her request. As soon as I left, the daughter came out to apologize and said that pt was about to have a long waited BM. Assured her I totally understood, no worries.     Palliative  will continue to follow for emotional and spiritual support to pt and her family. Lord, in your mercy.    **Spiritual Care Dept. Chaplains are available evenings, overnight and weekends **    In Peace,    Rev. Lisa Green MDiv, [facility]  Board Certified   Palliative Medicine Department  [phone]

## 2024-04-30 NOTE — CARE UPDATE
RAPID RESPONSE NURSE PROACTIVE ROUNDING NOTE       Time of Visit: 1615    Admit Date: 2024  LOS: 10  Code Status: Full Code   Date of Visit: 2024  : 1970  Age: 54 y.o.  Sex: female  Race: White  Bed: 801/801 A:   MRN: [id number]  Was the patient discharged from an ICU this admission? Yes   Was the patient discharged from a PACU within last 24 hours? No   Did the patient receive conscious sedation/general anesthesia in last 24 hours? No  Was the patient in the ED within the past 24 hours? No  Was the patient on NIPPV within the past 24 hours? No   Attending Physician: Frances Luong MD  Primary Service: [facility] ONCOL[facility]   Time spent at the bedside: 30 - 45 min    SITUATION    Notified by bedside RN via phone call.  Reason for alert: N/V, tachycardia   Called to evaluate the patient for Circulatory    BACKGROUND     Why is the patient in the hospital?: Acute hypoxemic respiratory failure    Patient has a past medical history of Anxiety, Depression, FH: ovarian cancer, psychiatric care, Hypertension, Hyperthyroidism, Hypothyroid, Kidney calculi, Malignant neoplasm of sigmoid colon, Menorrhagia, Multinodular goiter, Palpitation, Psychiatric problem, Venous insufficiency, and Vulvar lesion.    Last Vitals:  Temp: 99.1 °F (37.3 °C) ( 1524)  Pulse: 135 ( 1524)  Resp: 8 ( 1605)  BP: 144/72 ( 1524)  SpO2: 90 % ( 1524)    24 Hours Vitals Range:  Temp:  [97.5 °F (36.4 °C)-99.1 °F (37.3 °C)]   Pulse:  [109-135]   Resp:  [8-24]   BP: (120-144)/(63-78)   SpO2:  [90 %-96 %]     Labs:  Recent Labs     24  0250 24  0358 24  0329   WBC 5.99 5.56 5.56   HGB 9.8* 9.6* 9.1*   HCT 30.3* 29.7* 29.3*    219 213       Recent Labs     24  0250 24  0358 24  0329    140 141   K 4.3 3.8 3.9   * 113* 112*   CO2 16* 17* 19*   BUN 67* 54* 36*   CREATININE 2.3* 1.4 1.1   GLU 97 97 86   PHOS 3.5 2.6* 2.2*   MG 2.7* 2.1 1.8      ASSESSMENT    Physical  Exam  Vitals and nursing note reviewed.   Constitutional:       General: She is awake. She is not in acute distress.     Appearance: She is ill-appearing.   Cardiovascular:      Rate and Rhythm: Regular rhythm. Tachycardia present.      Comments: EKG ordered   Pulmonary:      Effort: Pulmonary effort is normal.   Chest:      Comments: PAC  Abdominal:      General: Bowel sounds are decreased. There is distension.      Palpations: Abdomen is soft.      Comments: +nausea, +vomit   NGT placed, ~450cc bile out after placement   8mg zofran given    Neurological:      Mental Status: She is alert. Mental status is at baseline.       INTERVENTIONS    The patient was seen for Cardiac problem. Staff concerns included tachycardia. The following interventions were performed: EKG, continuous cardiac monitoring continued, continuous pulse ox monitoring continued, and NGT placement.    RECOMMENDATIONS    Maintain NGT to LIWS   EKG for QTc and tachycardia   Continuous pulse ox and cardiac monitoring     PROVIDER ESCALATION    Yes/No  Yes    Orders received and case discussed with Dr. Carrasco .    Disposition: Remain in room 801.    FOLLOW-UP    Bedside RNBelinda & charge RN Jay  updated on plan of care. Instructed to call the Rapid Response Nurse, Jeanette Zhou RN at [phone] for additional questions or concerns.

## 2024-04-30 NOTE — CARE UPDATE
Notified by pt RN that patient is vomiting, tachycardic sustaining in the 130s. Went to bedside to examine patient. Currently reporting abdominal pain and distension. Has not been passing gas or having bowel movements since this morning. Exam notable for abdominal distension and diffuse tenderness to palpation. Ordered NGT for decompression, made NPO, and ordered stat KUB.     NGT placed by rapid response team with >500cc of bilious/gastric contents. No bloody fluid seen. Pt reporting relief with NGT insertion. On re-evaluation resting in bed comfortably after having received PRN Morphine.

## 2024-04-30 NOTE — SUBJECTIVE & OBJECTIVE
Interval History: Tachycardia remains stable overnight. Weaning O2 as tolerated. Slept well, mental status improving overall. Still having pain but controlled much better. Stools have decreased however continues to have loose BM. Working with PT/OT, pursuing rehab placement.    Oncology Treatment Plan:   [No matching plan found]    Medications:  Continuous Infusions:  Current Facility-Administered Medications   Medication Dose Route Frequency Last Rate Last Admin     Scheduled Meds:  Current Facility-Administered Medications   Medication Dose Route Frequency    buPROPion  150 mg Oral Daily    cinacalcet  30 mg Oral Daily with breakfast    enoxparin  60 mg Subcutaneous Q12H (prophylaxis, 0900/2100)    famotidine  20 mg Oral Daily    gabapentin  300 mg Oral QHS    levothyroxine  350 mcg Oral Before breakfast    loperamide  4 mg Oral QID    oxyCODONE  20 mg Oral Q12H    predniSONE  5 mg Oral Daily     PRN Meds:  Current Facility-Administered Medications:     calcium carbonate, 1,000 mg, Oral, TID PRN    dextrose 10%, 12.5 g, Intravenous, PRN    dextrose 10%, 25 g, Intravenous, PRN    dicyclomine, 20 mg, Oral, BID PRN    diphenoxylate-atropine 2.5-0.025 mg, 1 tablet, Oral, QID PRN    glucagon (human recombinant), 1 mg, Intramuscular, PRN    glucose, 16 g, Oral, PRN    glucose, 24 g, Oral, PRN    hydrOXYzine HCL, 25 mg, Oral, TID PRN    lactulose, 10 g, Oral, Q6H PRN    LORazepam, 1 mg, Oral, Q12H PRN    naloxone, 0.02 mg, Intravenous, PRN    ondansetron, 4 mg, Oral, Q8H PRN    ondansetron, 4 mg, Intravenous, Daily PRN    oxyCODONE, 10 mg, Oral, Q4H PRN    prochlorperazine, 10 mg, Oral, Q6H PRN    risperiDONE, 2 mg, Oral, Nightly PRN    sodium chloride 0.9%, 10 mL, Intravenous, Q12H PRN     Review of Systems  Objective:     Vital Signs (Most Recent):  Temp: 98.4 °F (36.9 °C) (04/30/24 1214)  Pulse: (!) 116 (04/30/24 1214)  Resp: 18 (04/30/24 1214)  BP: 136/78 (04/30/24 1214)  SpO2: (!) 94 % (04/30/24 1214) Vital Signs  (24h Range):  Temp:  [97.5 °F (36.4 °C)-98.4 °F (36.9 °C)] 98.4 °F (36.9 °C)  Pulse:  [109-120] 116  Resp:  [18-20] 18  SpO2:  [92 %-96 %] 94 %  BP: (119-137)/(63-78) 136/78     Weight: (!) 162 kg (357 lb 2.3 oz)  Body mass index is 55.94 kg/m².  Body surface area is 2.77 meters squared.      Intake/Output Summary (Last 24 hours) at 4/30/2024 1439  Last data filed at 4/30/2024 1155  Gross per 24 hour   Intake 1420 ml   Output 1530 ml   Net -110 ml        Physical Exam  Vitals reviewed.   Constitutional:       General: She is not in acute distress.     Appearance: She is ill-appearing. She is not toxic-appearing or diaphoretic.   HENT:      Head: Normocephalic and atraumatic.      Right Ear: External ear normal.      Left Ear: External ear normal.      Nose: Nose normal.      Mouth/Throat:      Mouth: Mucous membranes are moist.   Eyes:      General: No scleral icterus.        Right eye: No discharge.         Left eye: No discharge.      Extraocular Movements: Extraocular movements intact.   Neck:      Comments: Trachea midline  Cardiovascular:      Rate and Rhythm: Tachycardia present.      Pulses: Normal pulses.      Heart sounds: Normal heart sounds.   Pulmonary:      Effort: Pulmonary effort is normal. No respiratory distress.   Abdominal:      General: Bowel sounds are normal. There is distension.      Palpations: Abdomen is soft.      Tenderness: There is no abdominal tenderness.      Comments: Bilateral nephrostomies in place dressing c/d/i   Musculoskeletal:         General: No deformity or signs of injury.      Cervical back: Normal range of motion.      Right lower leg: Edema present.      Left lower leg: Edema present.   Skin:     General: Skin is warm and dry.      Coloration: Skin is not jaundiced.      Findings: No rash.   Neurological:      General: No focal deficit present.      Mental Status: She is alert and oriented to person, place, and time.      Cranial Nerves: No cranial nerve deficit.    Psychiatric:         Behavior: Behavior normal.         Judgment: Judgment normal.          Significant Labs:   All pertinent labs from the last 24 hours have been reviewed.    Diagnostic Results:  I have reviewed all pertinent imaging results/findings within the past 24 hours.

## 2024-04-30 NOTE — PROGRESS NOTES
[facility] - Oncology ([facility])  Hematology/Oncology  Progress Note    Patient Name: Gail Mckinnon  Admission Date: 4/19/2024  Hospital Length of Stay: 10 days  Code Status: Full Code     Subjective:     HPI:  54F with sigmoid cancer (patient of Dr. Pimentel, has progressed through multiple therapies and recently on single agent Debo) with evidence of disease progression and active OP plans to start fruquintinib who presented to the ED with hip pain. Vitals stable, labs show MARILOU. CT shows tumor involvement at left hip illiopsoas which may be contributing to pain. Patient admitted for pain management and MARILOU.     Interval History: Tachycardia remains stable overnight. Weaning O2 as tolerated. Slept well, mental status improving overall. Still having pain but controlled much better. Stools have decreased however continues to have loose BM. Working with PT/OT, pursuing rehab placement.    Oncology Treatment Plan:   [No matching plan found]    Medications:  Continuous Infusions:  Current Facility-Administered Medications   Medication Dose Route Frequency Last Rate Last Admin     Scheduled Meds:  Current Facility-Administered Medications   Medication Dose Route Frequency    buPROPion  150 mg Oral Daily    cinacalcet  30 mg Oral Daily with breakfast    enoxparin  60 mg Subcutaneous Q12H (prophylaxis, 0900/2100)    famotidine  20 mg Oral Daily    gabapentin  300 mg Oral QHS    levothyroxine  350 mcg Oral Before breakfast    loperamide  4 mg Oral QID    oxyCODONE  20 mg Oral Q12H    predniSONE  5 mg Oral Daily     PRN Meds:  Current Facility-Administered Medications:     calcium carbonate, 1,000 mg, Oral, TID PRN    dextrose 10%, 12.5 g, Intravenous, PRN    dextrose 10%, 25 g, Intravenous, PRN    dicyclomine, 20 mg, Oral, BID PRN    diphenoxylate-atropine 2.5-0.025 mg, 1 tablet, Oral, QID PRN    glucagon (human recombinant), 1 mg, Intramuscular, PRN    glucose, 16 g, Oral, PRN    glucose, 24 g, Oral, PRN    hydrOXYzine  HCL, 25 mg, Oral, TID PRN    lactulose, 10 g, Oral, Q6H PRN    LORazepam, 1 mg, Oral, Q12H PRN    naloxone, 0.02 mg, Intravenous, PRN    ondansetron, 4 mg, Oral, Q8H PRN    ondansetron, 4 mg, Intravenous, Daily PRN    oxyCODONE, 10 mg, Oral, Q4H PRN    prochlorperazine, 10 mg, Oral, Q6H PRN    risperiDONE, 2 mg, Oral, Nightly PRN    sodium chloride 0.9%, 10 mL, Intravenous, Q12H PRN     Review of Systems  Objective:     Vital Signs (Most Recent):  Temp: 98.4 °F (36.9 °C) (04/30/24 1214)  Pulse: (!) 116 (04/30/24 1214)  Resp: 18 (04/30/24 1214)  BP: 136/78 (04/30/24 1214)  SpO2: (!) 94 % (04/30/24 1214) Vital Signs (24h Range):  Temp:  [97.5 °F (36.4 °C)-98.4 °F (36.9 °C)] 98.4 °F (36.9 °C)  Pulse:  [109-120] 116  Resp:  [18-20] 18  SpO2:  [92 %-96 %] 94 %  BP: (119-137)/(63-78) 136/78     Weight: (!) 162 kg (357 lb 2.3 oz)  Body mass index is 55.94 kg/m².  Body surface area is 2.77 meters squared.      Intake/Output Summary (Last 24 hours) at 4/30/2024 1439  Last data filed at 4/30/2024 1155  Gross per 24 hour   Intake 1420 ml   Output 1530 ml   Net -110 ml        Physical Exam  Vitals reviewed.   Constitutional:       General: She is not in acute distress.     Appearance: She is ill-appearing. She is not toxic-appearing or diaphoretic.   HENT:      Head: Normocephalic and atraumatic.      Right Ear: External ear normal.      Left Ear: External ear normal.      Nose: Nose normal.      Mouth/Throat:      Mouth: Mucous membranes are moist.   Eyes:      General: No scleral icterus.        Right eye: No discharge.         Left eye: No discharge.      Extraocular Movements: Extraocular movements intact.   Neck:      Comments: Trachea midline  Cardiovascular:      Rate and Rhythm: Tachycardia present.      Pulses: Normal pulses.      Heart sounds: Normal heart sounds.   Pulmonary:      Effort: Pulmonary effort is normal. No respiratory distress.   Abdominal:      General: Bowel sounds are normal. There is distension.       Palpations: Abdomen is soft.      Tenderness: There is no abdominal tenderness.      Comments: Bilateral nephrostomies in place dressing c/d/i   Musculoskeletal:         General: No deformity or signs of injury.      Cervical back: Normal range of motion.      Right lower leg: Edema present.      Left lower leg: Edema present.   Skin:     General: Skin is warm and dry.      Coloration: Skin is not jaundiced.      Findings: No rash.   Neurological:      General: No focal deficit present.      Mental Status: She is alert and oriented to person, place, and time.      Cranial Nerves: No cranial nerve deficit.   Psychiatric:         Behavior: Behavior normal.         Judgment: Judgment normal.          Significant Labs:   All pertinent labs from the last 24 hours have been reviewed.    Diagnostic Results:  I have reviewed all pertinent imaging results/findings within the past 24 hours.  Assessment/Plan:     * Acute hypoxemic respiratory failure  Patient with Hypoxic Respiratory failure which is Acute.  she is not on home oxygen. Supplemental oxygen was provided and noted-      .   Signs/symptoms of respiratory failure include- tachypnea and respiratory distress. Contributing diagnoses includes -  Volume overload  Labs and images were reviewed. Patient Has not had a recent ABG. Will treat underlying causes and adjust management of respiratory failure as follows-     Pt received 240mg IV Lasix and was ultimately transferred to MICU and placed on HFNC. Subsequently weaned to RA. Stepped down, requiring supplemental O2 overnight. Suspect component of ROSE MARY/anxiety contributing to night time O2 requirement    - Wean O2 as tolerated  - 6MWT if unable to wean prior to DC    Ileus  C/f possible Ileus on Xray Abdomen. Placed on CLD and advanced to full in MICU. Patient having bowel movements, active bowel sounds, no abdominal pain. Appears to be resolved.    - consider NGT if worsening abdominal exam/pain and not passing  gas/stools    Acute metabolic encephalopathy  Suspect component of opioids, uremia, pain, hospital delirium.     - Adding back pain regimen gradually  - MARILOU, uremia resolving    MARILOU (acute kidney injury)  Pt reports decreased urine output and decreased PO intake    Creatinine 2.8 on admit, baseline around 0.9 - 1.0  - Likely pre-renal from dehydration and volume losses superimposed on post renal  - continuing to worsen, Urology unable to stent  - S/p bilateral nephrostomy placement  - Received high dose diuresis for AHRF and c/f volume overload  - Overall downtrending, nephrostomies with good output      Results:  US retroperitoneal: Moderate bilateral hydronephrosis, corresponding to findings on prior CT performed 03/11/2024.       Plan:   - S/p bilateral nephrostomy placement, monitor daily CMP  - Qshift nephrostomy flushing  - Strict I&Os and daily weights   - Avoid nephrotoxic agents such as NSAIDs, gadolinium and IV radiocontrast.  - Renally dose meds to current GFR.  - Maintain MAP > 65.        Hip pain  - CT evidence of tumor involvement at left hip illiopsoas which may be contributing to pain. Pain improving with adjustments made to regimen per palliative.  - S/p palliative radiation to L iliopsoas      Plan:  - palliative medicine following, pain medications discontinued in MICU 2/2 concern for causing confusion  - continue oxycodone to 10 mg q4h prn  - continue Oxycontin 20 mg BID  - prednisone 5 mg daily for inflammatory pain given muscle involvement  - gabapentin to 300 mg QHS    Hypothyroidism  - continue home synthroid     Diarrhea  Likely 2/2 radiation. Cdiff negative. Improving    - Scheduled Imodium  - PRN lomotil    Other constipation  Multiple BM following radiation, DC miralax/senna-doc.    Anxiety  - home bupropion   - PRN Lorazepam    Malignant neoplasm of sigmoid colon  Patient of Dr. Pimentel, has progressed through multiple therapies and recently on single agent Debo) with evidence of disease  progression and active OP plans to start fruquintinib              Dimitry Carrasco MD  Hematology/Oncology  [facility] - [facility] ([facility])

## 2024-04-30 NOTE — ASSESSMENT & PLAN NOTE
Patient appeared to present with anxiety during the session while discussing her health difficulties. Patient was taught visual imagery exercise for relaxation and she practiced it during the session. Patient noted she imagined herself at the beach and that it helped somewhat with relaxation. Patient was encouraged to practice this skill on her own and to continuing engaging with Dr. Gallagher once she is discharged from hospital. Patient request to conclude consult which appeared to be related to patient's increased anxiety so I concluded the consult with her per her request. Patient declined for me to check-in with her again during hospital stay and she noted she would reach out as needed. I provided patient with my clinic contact information and encouraged her to let her inpatient medical team know if she wants to request further psychological services. Patient expressed appreciation for me meeting with her.

## 2024-04-30 NOTE — PROGRESS NOTES
Met with patient and daughter at the bedside. Spoke with them about recs and reviewed all of the choices for rehab. Asked that I send to one other additional place. Sent the referral via [employer].

## 2024-04-30 NOTE — ASSESSMENT & PLAN NOTE
Patient with Hypoxic Respiratory failure which is Acute.  she is not on home oxygen. Supplemental oxygen was provided and noted-      .   Signs/symptoms of respiratory failure include- tachypnea and respiratory distress. Contributing diagnoses includes -  Volume overload  Labs and images were reviewed. Patient Has not had a recent ABG. Will treat underlying causes and adjust management of respiratory failure as follows-     Pt received 240mg IV Lasix and was ultimately transferred to MICU and placed on HFNC. Subsequently weaned to RA. Stepped down, requiring supplemental O2 overnight. Suspect component of ROSE MARY/anxiety contributing to night time O2 requirement    - Wean O2 as tolerated  - 6MWT if unable to wean prior to DC

## 2024-04-30 NOTE — ASSESSMENT & PLAN NOTE
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 Pain improving with adjustments made to regimen per palliative.  - S/p palliative radiation to L iliopsoas      Plan:  - palliative medicine following, pain medications discontinued in MICU 2/2 concern for causing confusion  - continue oxycodone to 10 mg q4h prn  - continue Oxycontin 20 mg BID  - prednisone 5 mg daily for inflammatory pain given muscle involvement  - gabapentin to 300 mg QHS

## 2024-04-30 NOTE — PT/OT/SLP PROGRESS
Occupational Therapy  Co- Treatment    Name: Gail Mckinnon  MRN: [id number]  Admitting Diagnosis:  Acute hypoxemic respiratory failure  8 Days Post-Op  Co-treatment to accommodate for pt. Anticipated activity tolerance as well as to ensure pt. safety  Recommendations:     Discharge Recommendations: High Intensity Therapy  Discharge Equipment Recommendations:  walker, rolling, bedside commode  Barriers to discharge:  Other (Comment) (requires increased assist)    Assessment:     Gail Mckinnon is a 54 y.o. female with a medical diagnosis of Acute hypoxemic respiratory failure.  She presents with significant pain noted in LLE as well as abdominal region. Pt. Making improvements with mobility and self-care tasks on this date. Good participation noted. Patient would benefit from continued OT services to maximize level of safety and independence with self-care tasks.   . Performance deficits affecting function: weakness, impaired endurance, impaired functional mobility, impaired balance, impaired self care skills, decreased lower extremity function, pain.       Rehab Prognosis:  Good; patient would benefit from acute skilled OT services to address these deficits and reach maximum level of function.       Plan:     Patient to be seen 4 x/week to address the above listed problems via self-care/home management, therapeutic activities, therapeutic exercises, neuromuscular re-education  Plan of Care Expires: 05/28/24  Plan of Care Reviewed with: patient, daughter    Subjective     Chief Complaint: pain in abdomen and left leg  Patient/Family Comments/goals: to get better  Pain/Comfort:  Pain Rating 1: 7/10  Location 1: abdomen  Pain Addressed 1: Reposition, Distraction  Pain Rating Post-Intervention 1: 7/10    Objective:     Communicated with: nurse prior to session.  Patient found supine with oxygen, telemetry, nephrostomy (nephrostomy x 2) upon OT entry to room. Daughter present    General Precautions: Standard,  fall    Orthopedic Precautions:N/A  Braces: N/A  Respiratory Status: Nasal cannula, flow 3 L/min     Occupational Performance:     Bed Mobility:    Patient completed Rolling/Turning to Left with  moderate assistance  Patient completed Rolling/Turning to Right with maximal assistance  Patient completed Scooting/Bridging with moderate assistance x 1  Patient completed Supine to Sit with moderate assistance and 2 persons  Patient completed Sit to Supine with total assistance and 2 persons     Functional Mobility/Transfers:  Patient completed Sit <> Stand Transfer with moderate assistance and of 2 persons  with  no assistive device   Functional Mobility: Unable to take side steps on this date unable to clear feet    Activities of Daily Living:  Grooming: contact guard assistance for balance when seated EOB to brush hair      AMPAC 6 Click ADL: 14    Treatment & Education:  Pt. Sat EOB x ~ 15 minutes with SBA/CGA  Pt. Engaged in reaching tasks with CGA/SBA when seated EOB    Patient left supine with all lines intact, call button in reach, bed alarm on, and family present    GOALS:   Multidisciplinary Problems       Occupational Therapy Goals          Problem: Occupational Therapy    Goal Priority Disciplines Outcome Interventions   Occupational Therapy Goal     OT, PT/OT Progressing    Description: Goals to be met by: 5/26/24    Patient will increase functional independence with ADLs by performing:    LE Dressing with Supervision and Assistive Devices as needed.  Grooming while standing at sink with Modified [place].  Toileting from toilet with [place] for hygiene and clothing management.   Supine to sit with Modified [place].  Toilet transfer to toilet with Modified [place].                         Time Tracking:     OT Date of Treatment: 04/30/24  OT Start Time: 1057  OT Stop Time: 1131  OT Total Time (min): 34 min    Billable Minutes:Self Care/Home Management 17  Therapeutic Activity 17    OT/SHEILA:  OT          4/30/2024

## 2024-04-30 NOTE — SUBJECTIVE & OBJECTIVE
Pain: Patient rated her pain level today as 10/10 and noted it is in her stomach. She noted pain medication helps reduce pain and that she did not feel like she needed to reach out to medical team about it at this time.    Symptoms:   Mood: Patient noted her mood has not been good recently.  Anxiety: Patient appeared to present with increased anxiety during session while talking about her health concerns.  Substance Abuse: denied  Cognitive Functioning:  Patient noted that she sees that she has been having difficulties with memory.  Health Behaviors:  Difficulty coping with illness    Psychiatric History: currently under psychiatric care including meeting with Dr. Gallagher for psychotherapy for coping with cancer.    Past Medical History:   Diagnosis Date    Anxiety     Depression     FH: ovarian cancer 03/16/2020    Hx of psychiatric care     Effexor, Paxil, Lexapro, Zoloft, Wellbutrin, Trazodone Buspar    Hypertension     Hyperthyroidism     Hypothyroid     Kidney calculi     Malignant neoplasm of sigmoid colon 03/16/2020    Menorrhagia     Multinodular goiter 08/24/2012    Palpitation     Psychiatric problem     Venous insufficiency     Vulvar lesion      Social History (marriage, employment, etc.): Patient noted she lives alone. Patient stated she has one daughter. She noted she has friends who are supports for her.    Substance Use:   Alcohol:  Rare   Drugs: none   Tobacco: none   Caffeine: none    Current Medications and Drug Reactions (include OTC, herbal):   See medication list.     Psychotherapeutics (From admission, onward)      Start     Stop Route Frequency Ordered    04/29/24 1712  risperiDONE disintegrating tablet 2 mg         -- Oral Nightly PRN 04/29/24 1612    04/29/24 1040  LORazepam tablet 1 mg         -- Oral Every 12 hours PRN 04/29/24 0940    04/20/24 0900  buPROPion TB24 tablet 150 mg         -- Oral Daily 04/20/24 0348            Strengths and Liabilities:  Strength: Patient appears to have  positive support; Liabilities: Patient appears to have difficulties coping with illness    Current Evaluation:     Mental Status Exam:  General Appearance:  lying in bed   Speech: Patient appeared to have somewhat slowed response.      Level of Cooperation: cooperative for most of consult and then concluded as she requested to conclude due to what appeared to be increased stress      Thought Processes: logical   Mood: anxious      Thought Content: No SI/HI   Affect: anxious, congruent   Orientation: Patient was oriented to person but did noted she thought today was Thursday when it was Tuesday; did not ask about orientation to place   Memory: Patient noted she sees that she has had difficulties with memory   Attention Span & Concentration: Patient appeared to have slight difficulties with focus as a couple of questions had to be repeated     Fund of General Knowledge: Patient was able to name current president of the [place] but not most recent one. It is possible that this is related to memory difficulties   Abstract Reasoning: Did not assess in current session   Judgment & Insight: Fair to good     Language  Somewhat slowed response and patient a couple of times appeared to have difficulties with thinking of a word she wanted to say

## 2024-04-30 NOTE — SUBJECTIVE & OBJECTIVE
Interval History: Distressed about having bowel movement. Was getting upset, telling me that others have been forbidding her to even use bed pan. Thankfully, RN came in and graciously called in another to help her get on bed pan in anticipation of a bowel movement.    Prior 24 hour MME: 105 MME  Oxycontin 20 mg BID x 2 = 60 MME  Oxycodone 10 mg x 3 = 45 MME    Medications:  Continuous Infusions:  Current Facility-Administered Medications   Medication Dose Route Frequency Last Rate Last Admin     Scheduled Meds:  Current Facility-Administered Medications   Medication Dose Route Frequency    buPROPion  150 mg Oral Daily    cinacalcet  30 mg Oral Daily with breakfast    famotidine  20 mg Oral Daily    gabapentin  300 mg Oral QHS    heparin (porcine)  7,500 Units Subcutaneous Q8H    levothyroxine  350 mcg Oral Before breakfast    oxyCODONE  20 mg Oral Q12H    predniSONE  5 mg Oral Daily     PRN Meds:  Current Facility-Administered Medications:     calcium carbonate, 1,000 mg, Oral, TID PRN    dextrose 10%, 12.5 g, Intravenous, PRN    dextrose 10%, 25 g, Intravenous, PRN    dicyclomine, 20 mg, Oral, BID PRN    glucagon (human recombinant), 1 mg, Intramuscular, PRN    glucose, 16 g, Oral, PRN    glucose, 24 g, Oral, PRN    hydrOXYzine HCL, 25 mg, Oral, TID PRN    lactulose, 10 g, Oral, Q6H PRN    loperamide, 2 mg, Oral, QID PRN    LORazepam, 1 mg, Oral, Q12H PRN    naloxone, 0.02 mg, Intravenous, PRN    ondansetron, 4 mg, Oral, Q8H PRN    ondansetron, 4 mg, Intravenous, Daily PRN    oxyCODONE, 10 mg, Oral, Q4H PRN    prochlorperazine, 10 mg, Oral, Q6H PRN    risperiDONE, 2 mg, Oral, Nightly PRN    sodium chloride 0.9%, 10 mL, Intravenous, Q12H PRN    Objective:     Vital Signs (Most Recent):  Temp: 98.1 °F (36.7 °C) (04/30/24 0749)  Pulse: (!) 120 (04/30/24 0749)  Resp: 18 (04/30/24 0749)  BP: 131/77 (04/30/24 0749)  SpO2: (!) 93 % (04/30/24 0749) Vital Signs (24h Range):  Temp:  [97.5 °F (36.4 °C)-98.2 °F (36.8 °C)] 98.1  °F (36.7 °C)  Pulse:  [109-120] 120  Resp:  [18-20] 18  SpO2:  [92 %-96 %] 93 %  BP: (119-137)/(63-77) 131/77     Weight: (!) 162 kg (357 lb 2.3 oz)  Body mass index is 55.94 kg/m².       Physical Exam  Vitals reviewed.   Constitutional:       Appearance: She is not toxic-appearing.   HENT:      Head: Normocephalic and atraumatic.      Right Ear: External ear normal.      Left Ear: External ear normal.      Nose: Nose normal.      Mouth/Throat:      Mouth: Mucous membranes are moist.   Eyes:      Extraocular Movements: Extraocular movements intact.      Conjunctiva/sclera: Conjunctivae normal.   Neck:      Comments: Trachea midline  Cardiovascular:      Rate and Rhythm: Normal rate.   Pulmonary:      Effort: Pulmonary effort is normal. No respiratory distress.   Abdominal:      Palpations: Abdomen is soft.   Musculoskeletal:      Cervical back: Normal range of motion.   Neurological:      General: No focal deficit present.      Mental Status: She is alert. She is disoriented.   Psychiatric:         Mood and Affect: Mood is anxious and depressed. Affect is tearful.         Behavior: Behavior normal.         Judgment: Judgment normal.            Review of Symptoms      Symptom Assessment (ESAS 0-10 Scale)  Pain:  0  Dyspnea:  0  Anxiety:  0  Nausea:  0  Depression:  0  Anorexia:  0  Fatigue:  0  Insomnia:  0  Restlessness:  0  Agitation:  0  Unable to complete assessment due to Mental status change     CAM / Delirium:  Negative  Constipation:  Positive  Diarrhea:  Negative      Bowel Management Plan (BMP):  Yes      Pain Assessment:  OME in 24 hours:  125  Location(s): leg    Leg       Location: left        Quality: Aching, burning, sharp and pressure-like        Quantity: 8/10 in intensity        Chronicity: Onset 1 (greater than) week(s) ago, unchanged        Aggravating Factors: Activity and walking        Alleviating Factors: Opiates        Associated Symptoms: None    Pain Assessment in Advanced Demential Scale  (PAINAD)   Breathing - Independent of vocalization:  0  Negative vocalization:  0  Facial expression:  1  Body language:  0  Consolability:  1  Total:  2    Modified Ramona Scale:  0    Living Arrangements:  Lives with family and Lives in home    Psychosocial/Cultural:   See Palliative Psychosocial Note: No  Social Issues Identified: Coping deficit pt/family and Mental Health  Bereavement Risk: No  Caregiver Needs Discussed. Caregiver Distress: No: n/a  Cultural: patient enjoys shopping; she has three children (2 sons and 1 daughter). Has good support with her mother and close friends.   **Primary  to Follow**  Palliative Care  Consult: No    Spiritual:  F - Marilyn and Belief:  Taoism - Faith  I - Importance:  Yes  C - Community:  Has   A - Address in Care:  Palliative  involved        Advance Care Planning   Advance Directives:   Living Will: No    [place]: No    Do Not Resuscitate Status: No    Medical Power of : No        Oral Declaration: Yes  Agent's Name:  Star Mckinnon   Agent's Contact Number:  [phone]    Decision Making:  Patient answered questions  Goals of Care: What is most important right now is to focus on remaining as independent as possible, symptom/pain control, extending life as long as possible, even it it means sacrificing quality. Accordingly, we have decided that the best plan to meet the patient's goals includes continuing with treatment.         Significant Labs: All pertinent labs within the past 24 hours have been reviewed.  CBC:   Recent Labs   Lab 04/30/24 0329   WBC 5.56   HGB 9.1*   HCT 29.3*   MCV 95        BMP:  Recent Labs   Lab 04/30/24 0329   GLU 86      K 3.9   *   CO2 19*   BUN 36*   CREATININE 1.1   CALCIUM 10.6*   MG 1.8     LFT:  Lab Results   Component Value Date    AST 41 (H) 04/30/2024    ALKPHOS 108 04/30/2024    BILITOT 0.5 04/30/2024     Albumin:   Albumin   Date Value Ref Range Status  "  04/30/2024 2.0 (L) 3.5 - 5.2 g/dL Final     Protein:   Total Protein   Date Value Ref Range Status   04/30/2024 6.1 6.0 - 8.4 g/dL Final     Lactic acid:   Lab Results   Component Value Date    LACTATE 0.9 04/27/2024    LACTATE 1.4 04/26/2024       Significant Imaging: I have reviewed all pertinent imaging results/findings within the past 24 hours.    04/20/2024 US retroperitoneal: "Moderate bilateral hydronephrosis, corresponding to findings on prior CT performed 03/11/2024. At least 1 nonobstructing right renal calculus is noted.  Additional smaller no calculi seen on prior CT are not well characterized. Nonspecific increased attenuation dependently within the urinary bladder.  No definite internal vascularity on single provided image.  Findings are nonspecific and could relate to debris and/or hemorrhage.  Soft tissue mass would be difficult to exclude on limited images.  Cross-sectional imaging could be considered for further characterization."   "

## 2024-04-30 NOTE — ASSESSMENT & PLAN NOTE
Ms. 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 Of note, patient's recent imaging showed evidence of progression. Outpatient plan to start fruquintinib. In ED patient noted to have MARILOU. Imaging showed tumor involvement at left hip iliopsoas. S/p attempt for bilateral ureteral stents that was unsuccessful, followed by IR-guided bilateral nephrostomy tube placements for hydronephrosis on 4/22/24. Stepped up to the MICU on 4/26 for acute respiratory failure, stepped down on 4/28 for resolution of respiratory distress. Palliative Medicine consulted for malignant symptom management.    Sigmoid cancer/MARILOU/bilateral hydronephrosis/HTN/thyroid problem/other medical problems  - plan per primary team and other speciality consultants (Urology)    GOC/ACP  - patient decisional and by herself in room  - no ACP documents uploaded into EMR  - patient follows with Palliative Medicine NP Claudio and Dr. Hill on [facility] already; next appt: 04/23/24   - On discharge, I will contact outpatient palliative medicine team on [place] to arrange new follow up appointment  - NOK: patient's children  - On 04/20/2024 patient verbally stated she would want her middle son, Star Mckinnon, to be decision maker at [phone]  - will follow up at future visits to fill out ACP forms  - goals: improvement in symptoms to be able to go on cruise on Thursday 4/25/24 with friends; also continue life prolonging measures  - code status not discussed    Cancer related pain  - patient reporting severe pain in left hip; she also has pain at times in abdomen/groin and acute tenderness from new bilateral nephrostomy tube placements  - 24 hour OME: 30 MME (had been consistently requiring >200 MME/day prior to decompensation)  - outpatient regimen: oxycodone-apap  mg q4h prn, gabapentin 300 mg qhs, and flexeril 5 mg q8h prn.  This was not providing any relief.  - Continue Oxycontin 20 mg PO BID  - Continue oxycodone 10 mg PO q4h PRN severe pain  - If needing an agent for acute pain relief, consider hydromorphone 0.5 mg IV q4h PRN breakthrough pain. She did do well with 1 mg previously  - Psych/onc consulted for anxiety component driving uncontrolled pain  - Rad/onc engaged, performed simulation on 4/23  - Continue gabapentin 300 mg PO qHS (unable to increase due to creatinine clearance)    Nausea/Anorexia  - patient with increased nausea due to increased pain  - patient with poor appetite constantly  - would benefit from nutrition consult while in the hospital  - continue zofran 4 mg q8h prn and prochlorperazine 10 mg q6h prn    Constipation  - Bowel regimen being appropriately withheld due to multiple loose stools (last 4/29)  - uses senna at home with some relief    Anxiety/depression/spiritual distress  - patient feeling very overwhelmed with news of progression  - 4/21/24: patient spoke of possible urologic surgery (stents vs nephrostomy tubes). Patient feeling very overwhelmed by news of additional issues. Patient upset with ongoing bad news as well as possibility of not being able to make trip with her friends. Patient spoke about how she feels that this is her last trip she will be able to make, and she wants to be able to enjoy herself and the time she has left.   - she reports not feeling at peace spiritually  - 4/23/24: Emotional support provided in context of her malignancy and the barriers it has created in living her life (like attending cruise with friends this Thursday 4/25/24) and her understandable fears related to perception from others related to opioids for appropriate treatment of neoplasm-related pain.  - continue home buproprion  - resumed home lorazepam 1 mg PO BID PRN anxiety  - Psych/onc consulted    Acute encephalopathy  - Likely multifactorial, considering dramatic decrease in opioid administration, initiated  when she acutely decompensated for acute respiratory failure  - Content of speech is very paranoid, this is not her baseline  - QTc from 3/11/24 is normal (425 ms)  - Renal function markedly recovering  - Pain and anxiety management as above  - Risperidone 2 mg PO qHS PRN severe agitation, paranoia

## 2024-04-30 NOTE — RESPIRATORY THERAPY
RAPID RESPONSE RESPIRATORY CHART CHECK       Chart check completed. Please call [phone] for further concerns or assistance.

## 2024-04-30 NOTE — PLAN OF CARE
Plan of care reviewed with pt. Pt with c/o pain, scheduled and prn meds administered. Pt with 1 loose BM overnight, prn imodium administered. Pt tachy overnight. Pt c-diff negative. Pt free from falls or injuries; no acute events so far this shift. Pt in bed with non-skid socks on, bed locked and in lowest position, side rails up x2, call light and personal items within reach. Bed alarm set. Pt instructed to call for assistance as needed.    Nurses Note -- 4 Eyes      4/30/2024   4:52 AM      Skin assessed during: Q Shift Change      [x] No Altered Skin Integrity Present    []Prevention Measures Documented      [] Yes- Altered Skin Integrity Present or Discovered   [] LDA Added if Not in Epic (Describe Wound)   [] New Altered Skin Integrity was Present on Admit and Documented in LDA   [] Wound Image Taken    Wound Care Consulted? No    Attending Nurse:  Theodora Michaels RN/Staff Member:  Lisandra

## 2024-04-30 NOTE — HPI
Gail Mckinnon is a 54-year-old female with PMHx of ***. Patient presented to [facility] on *** for ***. On arrival, found to have ***.    Functional History: Patient lives in *** with *** in a *** story home with*** to enter.  Prior to admission, ***. DME: ***.

## 2024-04-30 NOTE — ASSESSMENT & PLAN NOTE
Patient appears to have anxiety regarding her diagnosis and treatment course. Patient would likely benefit from continued engagement with her therapist Dr. Gallagher. Patient was encouraged to continue engaging with Dr. Gallagher and patient noted they have been working on coping with cancer.

## 2024-04-30 NOTE — HPI
Patient was seen for ordered oncology psychology consult for difficulty coping with worsening pain/new nephrostomy tubes. Patient confirmed her identity via two identifiers and agreed to consent to meet with me today. She agreed to limits of confidentiality. Patient confirmed that she has been working with Dr. Gallagher in [place] on coping with cancer.

## 2024-05-01 PROBLEM — Z51.5 COMFORT MEASURES ONLY STATUS: Status: ACTIVE | Noted: 2024-01-01

## 2024-05-01 NOTE — PT/OT/SLP PROGRESS
Occupational Therapy      Patient Name:  Gail Mckinnon   MRN:  [id number]    Patient not seen today secondary to on NURSE HOLD 2/2 .     5/1/2024

## 2024-05-01 NOTE — HPI
Ms. Gail Mckinnon is a 54 y.o. female with metastatic colon cancer who is being admitted to inpatient hospice. Previous hospital course below.     Patient admitted for MARILOU and intractable hip pain uncontrolled with outpatient p.o. medication.  Palliative medicine recommended increasing oxycodone to 15 PRN, starting OxyContin 20 mg b.i.d. worsened MARILOU after fluids and due to bilateral hydronephrosis on retroperitoneal ultrasound, Urology consulted and recommending repeat UA with pyelogram and ureteral stents on 4/22. Unable to visualize ureteral openings, case aborted. IR consulted for bilateral nephrostomy placement, s/p placement 4/22. Overall hip pain improving at rest, however still painful with movement, rad/onc consulted for palliative radiation evaluation of soft tissue mets of Left iliopsoas, received 1 session of radiation 4/25. Palliative care continues to follow, adjusting pain regimen. PT/OT consulted, recommending low intensity therapy.      5/1 Patient developed abdominal distention yesterday and KUB with possible obstruction. NG placed and 600mL returned. Patient pulled out NG overnight. This morning she was too confused for another NG to be placed. HR increasing up to the 160's. O2 increasing up to 15L. Patient was too unstable for CT imaging. Had a long discussion with patient, her mother, and her daughter (on the phone). Patients metastatic cancer is incurable and we are concerning she has a bowel obstruction that will not improve and she is not a surgical candidate. We recommended making patient DNR and transitioning to comfort care, patients family was in agreement. Patient was discharged to inpatient hospice on 5/1.

## 2024-05-01 NOTE — DISCHARGE SUMMARY
Keanu [facility]ley - Oncology ([facility])  Hematology/Oncology  Discharge Summary      Patient Name: Gail Mckinnon  MRN: [id number]  Admission Date: 4/19/2024  Hospital Length of Stay: 11 days  Discharge Date and Time:  05/01/2024 1:31 PM  Attending Physician: Frances Luong MD   Discharging Provider: Gilberto Taylor MD  Primary Care Provider: Marah Santo MD    HPI: 54F with sigmoid cancer (patient of Dr. Pimentel, has progressed through multiple therapies and recently on single agent Debo) with evidence of disease progression and active OP plans to start fruquintinib who presented to the ED with hip pain. Vitals stable, labs show MARILOU. CT shows tumor involvement at left hip illiopsoas which may be contributing to pain. Patient admitted for pain management and MARILOU.     Procedure(s) (LRB):  CYSTOSCOPY (N/A)     Hospital Course: Patient admitted for MARILOU and intractable hip pain uncontrolled with outpatient p.o. medication.  Palliative medicine recommended increasing oxycodone to 15 PRN, starting OxyContin 20 mg b.i.d. worsened MARILOU after fluids and due to bilateral hydronephrosis on retroperitoneal ultrasound, Urology consulted and recommending repeat UA with pyelogram and ureteral stents on 4/22. Unable to visualize ureteral openings, case aborted. IR consulted for bilateral nephrostomy placement, s/p placement 4/22. Overall hip pain improving at rest, however still painful with movement, rad/onc consulted for palliative radiation evaluation of soft tissue mets of Left iliopsoas, received 1 session of radiation 4/25. Palliative care continues to follow, adjusting pain regimen. PT/OT consulted, recommending low intensity therapy.     5/1 Patient developed abdominal distention yesterday and KUB with possible obstruction. NG placed and 600mL returned. Patient pulled out NG overnight. This morning she was too confused for another NG to be placed. HR increasing up to the 160's. O2 increasing up to 15L. Patient was too  unstable for CT imaging. Had a long discussion with patient, her mother, and her daughter (on the phone). Patients metastatic cancer is incurable and we are concerning she has a bowel obstruction that will not improve and she is not a surgical candidate. We recommended making patient DNR and transitioning to comfort care, patients family was in agreement. Patient was discharged to inpatient hospice on 5/1.     Goals of Care Treatment Preferences:  Code Status: DNR    Health care agent: [facility] agent number: [phone]          What is most important right now is to focus on remaining as independent as possible, symptom/pain control, extending life as long as possible, even it it means sacrificing quality.  Accordingly, we have decided that the best plan to meet the patient's goals includes continuing with treatment.      Consults:   Consults (From admission, onward)          Status Ordering Provider     Inpatient consult to Hospice  Once        Provider:  (Not yet assigned)    Acknowledged AMOR MAHER     Inpatient consult to Critical Care Medicine  Once        Provider:  (Not yet assigned)    Completed DAVID KOTHARI     Inpatient consult to Physical Medicine Rehab  Once        Provider:  (Not yet assigned)    Completed DAVID KOTHARI     Inpatient consult to Critical Care Medicine  Once        Provider:  (Not yet assigned)    Completed CARLOS SERRANO     Inpatient consult to Hematology/Oncology Psychology  Once        Provider:  (Not yet assigned)    Completed DAVID KOTHARI     Inpatient consult to Radiation Oncology  Once        Provider:  (Not yet assigned)    Completed DAVID KOTHARI     Inpatient consult to Interventional Radiology  Once        Provider:  (Not yet assigned)    Completed ODELL CHOWDHURY     Inpatient consult to Urology  Once        Provider:  (Not yet assigned)    Completed MAX FERNANDEZ     Inpatient consult to Palliative Care  Once        Provider:  (Not yet assigned)     "Completed JOSR BARKLEY            Significant Diagnostic Studies: N/A    Pending Diagnostic Studies:       None          Final Active Diagnoses:    Diagnosis Date Noted POA    PRINCIPAL PROBLEM:  Acute hypoxemic respiratory failure [J96.01] 04/26/2024 No    Comfort measures only status [Z51.5] 05/01/2024 Not Applicable    Ileus [K56.7] 04/27/2024 No    Acute metabolic encephalopathy [G93.41] 04/26/2024 No    Hydronephrosis, bilateral [N13.30] 04/22/2024 Yes    Hip pain [M25.559] 04/20/2024 Yes    MARILOU (acute kidney injury) [N17.9] 04/20/2024 Yes    Palliative care encounter [Z51.5] 04/20/2024 Not Applicable    Diarrhea [R19.7] 05/03/2023 Yes    Other constipation [K59.09] 05/19/2020 Yes    Anxiety [F41.9] 05/05/2020 Yes    Malignant neoplasm of sigmoid colon [C18.7] 03/16/2020 Yes    Hypothyroidism [E03.9] 01/01/2001 Yes      Problems Resolved During this Admission:      Discharged Condition: critical    Disposition: Hospice/Medical Facility    Follow Up:    Patient Instructions:      BATH/SHOWER CHAIR FOR HOME USE     Order Specific Question Answer Comments   Height: 5' 6" (1.676 m)    Weight: 135 kg (297 lb 9.9 oz)    Does patient have medical equipment at home? none    Length of need (1-99 months): 99    Type: With back      WALKER FOR HOME USE     Order Specific Question Answer Comments   Type of Walker: Adult (5'4"-6'6")    With wheels? Yes    Height: 5' 6" (1.676 m)    Weight: 135 kg (297 lb 9.9 oz)    Length of need (1-99 months): 99    Does patient have medical equipment at home? none    Please check all that apply: Patient's condition impairs ambulation.      Ambulatory referral/consult to Interventional RAD   Standing Status: Future   Referral Priority: Routine Referral Type: Consultation   Number of Visits Requested: 1     Ambulatory referral/consult to Urology   Standing Status: Future   Referral Priority: Routine Referral Type: Consultation   Referral Reason: Specialty Services Required   Requested " Specialty: Urology   Number of Visits Requested: 1     Medications:  Reconciled Home Medications:      Medication List        START taking these medications      sucralfate 100 mg/mL suspension  Commonly known as: CARAFATE  Take 10 mLs (1 g total) by mouth 4 (four) times daily before meals and nightly.  Replaces: sucralfate 1 gram tablet            CHANGE how you take these medications      mirtazapine 7.5 MG Tab  Commonly known as: REMERON  Take 1 tablet (7.5 mg total) by mouth every evening.  What changed:   when to take this  reasons to take this            CONTINUE taking these medications      acetaminophen 500 MG tablet  Commonly known as: TYLENOL  Take 2 tablets (1,000 mg total) by mouth every 8 (eight) hours.     buPROPion 150 MG TBSR 12 hr tablet  Commonly known as: WELLBUTRIN SR  Take 1 tablet (150 mg total) by mouth 2 (two) times daily.     cinacalcet 30 MG Tab  Commonly known as: SENSIPAR  Take 1 tablet (30 mg total) by mouth daily with breakfast.     CONSTULOSE 10 gram/15 mL solution  Generic drug: lactulose  Take 15 mLs (10 g total) by mouth every 6 (six) hours as needed (constipation).     dicyclomine 20 mg tablet  Commonly known as: BENTYL  Take 1 tablet (20 mg total) by mouth 2 (two) times daily as needed (abdominal pain).     famotidine 40 MG tablet  Commonly known as: PEPCID  Take 1 tablet (40 mg total) by mouth once daily.     gabapentin 300 MG capsule  Commonly known as: NEURONTIN  Take 1 capsule (300 mg total) by mouth every evening.     Lactobacillus rhamnosus GG 10 billion cell capsule  Commonly known as: CULTURELLE  Take 1 capsule by mouth once daily.     levothyroxine 175 MCG tablet  Commonly known as: SYNTHROID  Take 2 tablets (350 mcg total) by mouth before breakfast.     LIDOcaine-prilocaine cream  Commonly known as: EMLA  Apply topically as needed (30 to 60 min prior chemo or port use).     LORazepam 1 MG tablet  Commonly known as: ATIVAN  Take 1 tablet (1 mg total) by mouth every 12  (twelve) hours as needed for Anxiety (nausea).     magic mouthwash diphen/antac/lidoc/nysta  Swish and swallow 10 ml by mouth 4 times daily     multivitamin per tablet  Commonly known as: THERAGRAN  Take 1 tablet by mouth once daily.     olmesartan 20 MG tablet  Commonly known as: BENICAR  Take 1 tablet (20 mg total) by mouth once daily.     * ondansetron 8 MG Tbdl  Commonly known as: ZOFRAN-ODT  Take 1 tablet (8 mg total) by mouth every 8 (eight) hours as needed.     * ondansetron 4 MG Tbdl  Commonly known as: ZOFRAN-ODT  Take 1 tablet (4 mg total) by mouth every 8 (eight) hours as needed (Nausea).     potassium chloride 10 MEQ Cpsr  Commonly known as: MICRO-K  Take 4 capsules (40 mEq total) by mouth once daily.     prochlorperazine 10 MG tablet  Commonly known as: COMPAZINE  Take 1 tablet (10 mg total) by mouth every 6 (six) hours as needed.     SENNA 8.6 mg tablet  Generic drug: senna  Take 2 tablets by mouth once daily.           * This list has 2 medication(s) that are the same as other medications prescribed for you. Read the directions carefully, and ask your doctor or other care provider to review them with you.                STOP taking these medications      sucralfate 1 gram tablet  Commonly known as: CARAFATE  Replaced by: sucralfate 100 mg/mL suspension            ASK your doctor about these medications      fruquintinib 5 mg Cap  Take 1 tablet daily for days 1 through 21 and then repeat every 28 days     oxyCODONE 5 MG immediate release tablet  Commonly known as: ROXICODONE  Take 1 tablet (5 mg total) by mouth every 4 (four) hours as needed for Pain (Abdominal pain).              Gilberto Taylor MD  Hematology/Oncology  [facility] - Oncology ([facility])

## 2024-05-01 NOTE — ASSESSMENT & PLAN NOTE
Patient with metastatic colon cancer admitted to inpatient hospice.     - Comfort care orders in   - PCA and ativan infusion   - PRN's as needed

## 2024-05-01 NOTE — PLAN OF CARE
Plan of care reviewed with patient and patient's mother. Pt remains afebrile. Free from falls or injury. Morphine given prn for pain. Lorazepam given for anxiety. Pt with left NGT to low/intermitt suction with brown drainage. NS infusing at 100ml/hr. Pt remains NPO. Bilat neph tubes draining clear, yellow urine. Pt's 's on telemetry. Oxygen 4L per NC. Pt turned q 2 and prn. Bed locked in lowest position, non skid socks on, call light within reach. Pt instructed to call if any assistance is needed. Vitals stable. Mother at bedside. Will cont to leigh pt.

## 2024-05-01 NOTE — PROGRESS NOTES
Triaged call:  Patient is going out of town and unable to come in today.  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 No other sx noted.   Her question is can she take an extra 1/2 tablet of the metoprolol 25 mg and if so can  it be in the morning or in the evening? Does it make a difference?   Nurse will Huddle with Ying abut this.      Maybe a phone visit would be in order as patient cannot access her computer for my chart today.    Gricelda Berry RN     Was called to pt's room by her mom who said pt was getting agitated and was trying to get out of bed. Pt stated she was hurting. As I'm giving pain meds I noticed pt had pulled her NGT out. Total output was 240ml for the shift. Notified charge nurse, Lisset Espinosa notify team and oncoming nurse. Will cont to leigh pt.

## 2024-05-01 NOTE — PT/OT/SLP DISCHARGE
Occupational Therapy Discharge Summary    Gail Mckinnon  MRN: [id number]   Principal Problem: Acute hypoxemic respiratory failure      Patient Discharged from acute Occupational Therapy on 05-01.  Please refer to prior OT note dated 04-30 for functional status.    Assessment:       MD discharged orders    Objective:     GOALS:   Multidisciplinary Problems       Occupational Therapy Goals          Problem: Occupational Therapy    Goal Priority Disciplines Outcome Interventions   Occupational Therapy Goal     OT, PT/OT Progressing    Description: Goals to be met by: 5/26/24    Patient will increase functional independence with ADLs by performing:    LE Dressing with Supervision and Assistive Devices as needed.  Grooming while standing at sink with Modified [place].  Toileting from toilet with [place] for hygiene and clothing management.   Supine to sit with Modified [place].  Toilet transfer to toilet with Modified [place].                         Reasons for Discontinuation of Therapy Services  Decline in status       Plan:     Patient Discharged to:  remains in acute    5/1/2024

## 2024-05-01 NOTE — PT/OT/SLP PROGRESS
Physical Therapy      Patient Name:  Gail Mckinnon   MRN:  [id number]    Patient not seen today secondary to MD discontinued PT orders. Will follow-up patient will transfer to palliative care .

## 2024-05-01 NOTE — PROGRESS NOTES
Patient began to rapidly decline this am. Went bedside with primary team and met with mother to discuss. Provided support to mother and patient. Stayed with mother and allowed her to process her feelings. Went to room again and met with daughter and provided ongoing support. Reached out to Soraida with [facility] in order to have patient evaluated for the [facility] bed.

## 2024-05-01 NOTE — CLINICAL REVIEW
"RAPID RESPONSE NURSE CHART REVIEW        Chart Reviewed: 05/01/2024, 7:41 AM    MRN: [id number]  Bed: 801/801 A    Dx: Acute hypoxemic respiratory failure    Gail Mckinnon has a past medical history of Anxiety, Depression, FH: ovarian cancer, psychiatric care, Hypertension, Hyperthyroidism, Hypothyroid, Kidney calculi, Malignant neoplasm of sigmoid colon, Menorrhagia, Multinodular goiter, Palpitation, Psychiatric problem, Venous insufficiency, and Vulvar lesion.    Last VS: /76 (BP Location: Left arm)   Pulse (!) 142   Temp 99.5 °F (37.5 °C) (Axillary)   Resp (!) 24   Ht 5' 7" (1.702 m)   Wt (!) 162 kg (357 lb 2.3 oz)   LMP 01/22/2020   SpO2 (!) 92%   Breastfeeding No   BMI 55.94 kg/m²     24H Vital Sign Range:  Temp:  [98.1 °F (36.7 °C)-99.5 °F (37.5 °C)]   Pulse:  [116-143]   Resp:  [8-24]   BP: (100-144)/(62-78)   SpO2:  [90 %-94 %]     Level of Consciousness (AVPU): alert    Recent Labs     04/29/24  0358 04/30/24  0329 05/01/24  0325   WBC 5.56 5.56 5.96   HGB 9.6* 9.1* 9.7*   HCT 29.7* 29.3* 31.2*    213 227       Recent Labs     04/29/24  0358 04/30/24  0329 05/01/24  0325    141 142   K 3.8 3.9 4.3   * 112* 112*   CO2 17* 19* 19*   BUN 54* 36* 30*   CREATININE 1.4 1.1 1.1   GLU 97 86 104   PHOS 2.6* 2.2* 2.4*   MG 2.1 1.8 1.6      OXYGEN:  Flow (L/min) (Oxygen Therapy): 5  Oxygen Concentration (%): 35       MEWS score: 3    Charge RN, *** contacted for tachycardia reports likely related to pain/anxiety. No additional concerns verbalized at this time. Instructed to call [phone] for further concerns or assistance.    Samantha De La Rosa RN        "

## 2024-05-01 NOTE — NURSING
PCA dilaudid dc and patient switched to dilaudid drip. 20ml of Dilaudid PCA wasted. Waste witnessed by Lyndsey charge nurse.

## 2024-05-01 NOTE — DISCHARGE SUMMARY
Keanu Marley - Oncology ([facility])  Hematology/Oncology  Discharge Summary      Patient Name: Gail Mckinnon  MRN: [id number]  Admission Date: 5/1/2024  Hospital Length of Stay: 0 days  Discharge Date and Time:  05/01/2024 6:28 PM  Attending Physician: Frances Luong MD   Discharging Provider: Norma Greco MD  Primary Care Provider: Marah Santo MD    HPI: Ms. Gail Mckinnon is a 54 y.o. female with metastatic colon cancer who is being admitted to inpatient hospice. Previous hospital course below.     Patient admitted for MARILOU and intractable hip pain uncontrolled with outpatient p.o. medication.  Palliative medicine recommended increasing oxycodone to 15 PRN, starting OxyContin 20 mg b.i.d. worsened MARILOU after fluids and due to bilateral hydronephrosis on retroperitoneal ultrasound, Urology consulted and recommending repeat UA with pyelogram and ureteral stents on 4/22. Unable to visualize ureteral openings, case aborted. IR consulted for bilateral nephrostomy placement, s/p placement 4/22. Overall hip pain improving at rest, however still painful with movement, rad/onc consulted for palliative radiation evaluation of soft tissue mets of Left iliopsoas, received 1 session of radiation 4/25. Palliative care continues to follow, adjusting pain regimen. PT/OT consulted, recommending low intensity therapy.      5/1 Patient developed abdominal distention yesterday and KUB with possible obstruction. NG placed and 600mL returned. Patient pulled out NG overnight. This morning she was too confused for another NG to be placed. HR increasing up to the 160's. O2 increasing up to 15L. Patient was too unstable for CT imaging. Had a long discussion with patient, her mother, and her daughter (on the phone). Patients metastatic cancer is incurable and we are concerning she has a bowel obstruction that will not improve and she is not a surgical candidate. We recommended making patient DNR and transitioning to comfort  care, patients family was in agreement. Patient was discharged to inpatient hospice on .     * No surgery found *     Hospital Course: Patient admitted to inpatient hospice 2/2 decline from underlying metastatic colon cancer and concern for SBO. Patient passed away evening of 24 at 6:22PM. Family at bedside.    Goals of Care Treatment Preferences:  Code Status: DNR    Health care agent: [facility] agent number: [phone]          What is most important right now is to focus on symptom/pain control, comfort and QOL .  Accordingly, we have decided that the best plan to meet the patient's goals includes enrolling in hospice care, pivot to comfort-focused care.      Consults:     Significant Diagnostic Studies: N/A    Pending Diagnostic Studies:       None          Final Active Diagnoses:    Diagnosis Date Noted POA    PRINCIPAL PROBLEM:  Comfort measures only status [Z51.5] 2024 Not Applicable      Problems Resolved During this Admission:      Discharged Condition:     Disposition:     Follow Up:    Patient Instructions:   No discharge procedures on file.  Medications:  None    Norma Greco MD  Hematology/Oncology  [facility] - Oncology ([facility])

## 2024-05-01 NOTE — CARE UPDATE
"RAPID RESPONSE NURSE CHART REVIEW        Chart Reviewed: 4/30/2024 2045    MRN: [id number]  Bed: 801/801 A    Dx: Acute hypoxemic respiratory failure    Gail Mckinnon has a past medical history of Anxiety, Depression, FH: ovarian cancer, psychiatric care, Hypertension, Hyperthyroidism, Hypothyroid, Kidney calculi, Malignant neoplasm of sigmoid colon, Menorrhagia, Multinodular goiter, Palpitation, Psychiatric problem, Venous insufficiency, and Vulvar lesion.    Last VS: /67 (BP Location: Left arm, Patient Position: Lying)   Pulse (!) 133   Temp 99 °F (37.2 °C) (Oral)   Resp 20   Ht 5' 7" (1.702 m)   Wt (!) 162 kg (357 lb 2.3 oz)   LMP 01/22/2020   SpO2 (!) 93%   Breastfeeding No   BMI 55.94 kg/m²     24H Vital Sign Range:  Temp:  [97.5 °F (36.4 °C)-99.1 °F (37.3 °C)]   Pulse:  [113-143]   Resp:  [8-24]   BP: (118-144)/(63-78)   SpO2:  [90 %-94 %]     Level of Consciousness (AVPU): alert    Recent Labs     04/29/24  0358 04/30/24  0329   WBC 5.56 5.56   HGB 9.6* 9.1*   HCT 29.7* 29.3*    213       Recent Labs     04/29/24  0358 04/30/24  0329    141   K 3.8 3.9   * 112*   CO2 17* 19*   BUN 54* 36*   CREATININE 1.4 1.1   GLU 97 86   PHOS 2.6* 2.2*   MG 2.1 1.8        No results for input(s): "PH", "PCO2", "PO2", "HCO3", "POCSATURATED", "BE" in the last 72 hours.     OXYGEN:  Flow (L/min) (Oxygen Therapy): 4  Oxygen Concentration (%): 35       MEWS score: 4    Rounding completed with charge LAVINIA Darling reports patient remains tachycardic 130s, VS otherwise stable. NGT remains in place. No additional concerns verbalized at this time. Instructed to call [phone] for further concerns or assistance.    GORDON Johnson RN       "

## 2024-05-01 NOTE — H&P
[facility] - Oncology ([facility])  Hematology/Oncology  H&P    Patient Name: Gail Mckinnon  MRN: [id number]  Admission Date: 5/1/2024  Code Status: DNR   Attending Provider: Frances Luong MD  Primary Care Physician: Marah Santo MD  Principal Problem:Comfort measures only status    Subjective:     HPI: Ms. Gail Mckinnon is a 54 y.o. female with metastatic colon cancer who is being admitted to inpatient hospice. Previous hospital course below.     Patient admitted for MARILOU and intractable hip pain uncontrolled with outpatient p.o. medication.  Palliative medicine recommended increasing oxycodone to 15 PRN, starting OxyContin 20 mg b.i.d. worsened MARILOU after fluids and due to bilateral hydronephrosis on retroperitoneal ultrasound, Urology consulted and recommending repeat UA with pyelogram and ureteral stents on 4/22. Unable to visualize ureteral openings, case aborted. IR consulted for bilateral nephrostomy placement, s/p placement 4/22. Overall hip pain improving at rest, however still painful with movement, rad/onc consulted for palliative radiation evaluation of soft tissue mets of Left iliopsoas, received 1 session of radiation 4/25. Palliative care continues to follow, adjusting pain regimen. PT/OT consulted, recommending low intensity therapy.      5/1 Patient developed abdominal distention yesterday and KUB with possible obstruction. NG placed and 600mL returned. Patient pulled out NG overnight. This morning she was too confused for another NG to be placed. HR increasing up to the 160's. O2 increasing up to 15L. Patient was too unstable for CT imaging. Had a long discussion with patient, her mother, and her daughter (on the phone). Patients metastatic cancer is incurable and we are concerning she has a bowel obstruction that will not improve and she is not a surgical candidate. We recommended making patient DNR and transitioning to comfort care, patients family was in agreement. Patient was  discharged to inpatient hospice on .      Oncology Treatment Plan:   [No matching plan found]    Medications:  Continuous Infusions:  Scheduled Meds:  PRN Meds:     Review of patient's allergies indicates:   Allergen Reactions    Oxaliplatin Other (See Comments)     Itchy hands, throat closed up, trouble hearing - during infusion    Penicillins Hives and Rash     childhood allergy          Past Medical History:   Diagnosis Date    Anxiety     Depression     FH: ovarian cancer 2020    Hx of psychiatric care     Effexor, Paxil, Lexapro, Zoloft, Wellbutrin, Trazodone Buspar    Hypertension     Hyperthyroidism     Hypothyroid     Kidney calculi     Malignant neoplasm of sigmoid colon 2020    Menorrhagia     Multinodular goiter 2012    Palpitation     Psychiatric problem     Venous insufficiency     Vulvar lesion      Past Surgical History:   Procedure Laterality Date     SECTION, CLASSIC      x3    COLON SURGERY      COLONOSCOPY N/A 2020    Procedure: COLONOSCOPY;  Surgeon: Shane Parker MD;  Location: [facility];  Service: Endoscopy;  Laterality: N/A;    COLONOSCOPY N/A 2022    Procedure: COLONOSCOPY;  Surgeon: Shane Parker MD;  Location: [facility];  Service: Endoscopy;  Laterality: N/A;    COLONOSCOPY N/A 2023    Procedure: COLONOSCOPY;  Surgeon: Shane Parker MD;  Location: [facility];  Service: Endoscopy;  Laterality: N/A;  no cecum anastomosis    CYSTOSCOPY N/A 2024    Procedure: CYSTOSCOPY;  Surgeon: Willie Bailey Jr., MD;  Location: [facility] OR [address] [place] [address];  Service: Urology;  Laterality: N/A;    CYSTOSCOPY W/ URETERAL STENT PLACEMENT Bilateral 2020    Procedure: CYSTOSCOPY, WITH URETERAL STENT INSERTION;  Surgeon: Claudio Tyson MD;  Location: [facility] OR 2ND FLR;  Service: Urology;  Laterality: Bilateral;    ESOPHAGOGASTRODUODENOSCOPY N/A 2024    Procedure: EGD (ESOPHAGOGASTRODUODENOSCOPY);  Surgeon: Shane Parker MD;  Location: [facility];  Service: Gastroenterology;  Laterality: N/A;    EXAMINATION UNDER ANESTHESIA N/A 01/16/2024    Procedure: Exam under anesthesia-vagina;  Surgeon: Eli Her MD;  Location: [facility] OR;  Service: OB/GYN;  Laterality: N/A;    EXCISION-WIDE LOCAL Left 01/16/2024    Procedure: EXCISION-WIDE LOCAL -  Labia Majora;  Surgeon: Eli Her MD;  Location: [facility] OR;  Service: OB/GYN;  Laterality: Left;  upper left side of labia majora    EXCISIONAL BIOPSY, LYMPH NODE  07/2022    abdominal x 4    INSERTION OF TUNNELED CENTRAL VENOUS CATHETER (CVC) WITH SUBCUTANEOUS PORT N/A 05/01/2020    Procedure: [id number]-CATH;  Surgeon: Doscaprice Diagnostic Provider;  Location: [facility] OR 2ND FLR;  Service: Radiology;  Laterality: N/A;  Room [facility]    LAPAROSCOPIC SIGMOIDECTOMY N/A 03/25/2020    Procedure: COLECTOMY, SIGMOID, LAPAROSCOPIC, flex sig, ERAS high;  Surgeon: Silvio Man MD;  Location: [facility] OR 2ND FLR;  Service: Colon and Rectal;  Laterality: N/A;    MOBILIZATION OF SPLENIC FLEXURE  03/25/2020    Procedure: MOBILIZATION, SPLENIC FLEXURE;  Surgeon: Silvio Man MD;  Location: [facility] OR 2ND FLR;  Service: Colon and Rectal;;    TONSILLECTOMY      TOTAL ABDOMINAL HYSTERECTOMY W/ BILATERAL SALPINGOOPHORECTOMY N/A 03/25/2020    Procedure: HYSTERECTOMY, TOTAL, ABDOMINAL, WITH BILATERAL SALPINGO-OOPHORECTOMY;  Surgeon: Keron Brady MD;  Location: [facility] OR 2ND FLR;  Service: Oncology;  Laterality: N/A;     Family History       Problem Relation (Age of Onset)    Cancer Maternal Aunt, Paternal Uncle, Maternal Grandmother    Colon cancer Paternal Uncle    Diabetes Mother (65)    Drug abuse Brother, Brother, Son, Son    Heart disease Father    No Known Problems Daughter    Ovarian cancer Maternal Aunt, Maternal Aunt, Maternal Grandmother, Cousin          Tobacco Use    Smoking status: Never    Smokeless tobacco: Never   Substance and Sexual Activity    Alcohol use: Yes     Comment: occasionally- twice  monthly    Drug use: Never    Sexual activity: Yes     Partners: Male     Birth control/protection: See Surgical Hx       Review of Systems   Unable to perform ROS: Acuity of condition     Objective:     Vital Signs (Most Recent):    Vital Signs (24h Range):  Temp:  [98.3 °F (36.8 °C)-99.5 °F (37.5 °C)] 99.5 °F (37.5 °C)  Pulse:  [127-170] 170  Resp:  [8-37] 37  SpO2:  [90 %-94 %] 92 %  BP: (100-144)/(62-76) 136/76        There is no height or weight on file to calculate BMI.  There is no height or weight on file to calculate BSA.    No intake or output data in the 24 hours ending 05/01/24 1413     Physical Exam Deferred due to comfort care.     Significant Labs:   None    Diagnostic Results:  None  Assessment/Plan:     * Comfort measures only status  Patient with metastatic colon cancer admitted to inpatient hospice.     - Comfort care orders in   - PCA and ativan infusion   - PRN's as needed         Gilberto Taylor MD  Hematology/Oncology  [facility] - Oncology ([facility])

## 2024-05-01 NOTE — CARE UPDATE
Death Note  Hospital Medicine  [facility]            I was called to bedside on 5/1/2024 at 6:20pm. Nursing at beside, nursing supervisor notified. Family at bedside.     No response to verbal or tactile stimuli. Pupils are fixed and non responsive. No breath sounds. No cardiac sounds on auscultation. No pulse.     Time of death is 5/1/2024  at 6:22PM        Cause of Death Metastatic colon cancer        Signing Physician:    Norma Greco MD  [facility] Contact  Email: [email or web address]

## 2024-05-01 NOTE — HOSPITAL COURSE
Patient admitted to inpatient hospice 2/2 decline from underlying metastatic colon cancer and concern for SBO. Patient passed away evening of 5/1/24 at 6:22PM. Family at bedside.

## 2024-05-01 NOTE — ASSESSMENT & PLAN NOTE
Ms. 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 Of note, patient's recent imaging showed evidence of progression. Outpatient plan to start fruquintinib. In ED patient noted to have MARILOU. Imaging showed tumor involvement at left hip iliopsoas. S/p attempt for bilateral ureteral stents that was unsuccessful, followed by IR-guided bilateral nephrostomy tube placements for hydronephrosis on 4/22/24. Stepped up to the MICU on 4/26 for acute respiratory failure, stepped down on 4/28 for resolution of respiratory distress. 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 Palliative Medicine has been following for malignant symptom management.    Sigmoid cancer/MARILOU/bilateral hydronephrosis/HTN/thyroid problem/other medical problems  - plan per primary team and other speciality consultants (Urology)    Cancer related pain  - patient reporting severe pain in left hip; she also has pain at times in abdomen/groin and acute tenderness from new bilateral nephrostomy tube placements  - 24 hour OME: 81 MME (had been consistently requiring >200 MME/day prior to decompensation)  - outpatient regimen: oxycodone-apap  mg q4h prn, gabapentin 300 mg qhs, and flexeril 5 mg q8h prn. This was not providing any relief.  - Discontinue Oxycontin 20 mg PO BID in setting of PO intolerance/malignant bowel obstruction  - Discontinue oxycodone 10 mg PO q4h PRN severe pain as above  - Start hydromorphone PCA   - Demand: 0.2 mg   - Lock out: 10 minutes   - Continuous: 0.2 mg   - Max: 1.4 mg/hr  - Will likely need escalation, but will start with this at baseline and titrate appropriately to achieve adequate pain control  - Until PCA can be started, continue hydromorphone 0.5 mg IV q1h PRN severe pain behaviors,  breakthrough pain  - May need 1 mg dose as this was the only effective regimen last week when she initially met inpatient palliative   - Psych/onc consulted for anxiety component driving uncontrolled pain  - Rad/onc engaged, performed simulation on 4/23  - Continue gabapentin 300 mg PO qHS (unable to increase due to creatinine clearance)    Nausea/Anorexia  - patient with increased nausea due to increased pain  - patient with poor appetite constantly  - would benefit from nutrition consult while in the hospital  - continue zofran 4 mg q8h prn and prochlorperazine 10 mg q6h prn    Constipation 2/2 likely malignant bowel obstruction  - GI rest, primary team appropriately pursued NG decompression which patient pulled out    Anxiety/depression/spiritual distress  - Takes lorazepam 1 mg PO BID PRN anxiety at home, verified via   - Agree with lorazepam 1 mg IV q6h PRN anxiety, nausea  - In setting of lorazepam IV shortage, may need to pivot to diazepam IV  - Psych/onc consulted    Acute encephalopathy  - Likely multifactorial, considering dramatic decrease in opioid administration, initiated when she acutely decompensated for acute respiratory failure  - Content of speech is very paranoid, this is not her baseline  - QTc elevated  - Renal function markedly recovering  - Pain and anxiety management as above    Goals of Care  - Will return today 5/1/24 to support family in navigating next steps, explore comfort focused care

## 2024-05-01 NOTE — CONSULTS
"Medical Emergency Team Consult Note  Critical Care Medicine    CC: Acute hypoxemic respiratory failure  Date: 05/01/2024  Admit Date: 4/19/2024  Hospital Length of Stay: 11  MRN: [id number]  The patient location is: Bed 801/801 A  Dx: Acute hypoxemic respiratory failure  Code Status: Full Code   Chart Reviewed: 05/01/2024, 8:53 AM      SUBJECTIVE:     HPI:  Gail Mckinnon has a past medical history of Anxiety, Depression, FH: ovarian cancer, psychiatric care, Hypertension, Hyperthyroidism, Hypothyroid, Kidney calculi, Malignant neoplasm of sigmoid colon, Menorrhagia, Multinodular goiter, Palpitation, Psychiatric problem, Venous insufficiency, and Vulvar lesion.    Significant Events: Called urgently to the bedside to evaluate the patient  for  tachycardia and increased abdominal pain  by the Physician       OBJECTIVE:     Physical Exam  Vitals reviewed.   Constitutional:       General: She is in acute distress.      Appearance: She is ill-appearing.   Eyes:      Pupils: Pupils are equal, round, and reactive to light.   Cardiovascular:      Rate and Rhythm: Tachycardia present.      Pulses: Normal pulses.   Pulmonary:      Effort: Respiratory distress present.   Abdominal:      General: There is distension.      Tenderness: There is abdominal tenderness.   Skin:     General: Skin is warm.   Neurological:      GCS: GCS eye subscore is 4. GCS verbal subscore is 4. GCS motor subscore is 6.         Last VS: /76 (BP Location: Left arm)   Pulse (!) 142   Temp 99.5 °F (37.5 °C) (Axillary)   Resp (!) 24   Ht 5' 7" (1.702 m)   Wt (!) 162 kg (357 lb 2.3 oz)   LMP 01/22/2020   SpO2 (!) 92%   Breastfeeding No   BMI 55.94 kg/m²     24H Vital Sign Range:    Temp:  [98.3 °F (36.8 °C)-99.5 °F (37.5 °C)]   Pulse:  [116-143]   Resp:  [8-24]   BP: (100-144)/(62-78)   SpO2:  [90 %-94 %]     Level of Consciousness (AVPU): alert      Intake/Output Summary (Last 24 hours) at 5/1/2024 0900  Last data filed at 5/1/2024 " "0727  Gross per 24 hour   Intake 1054.98 ml   Output 1750 ml   Net -695.02 ml       Recent Labs     04/29/24  0358 04/30/24  0329 05/01/24  0325   WBC 5.56 5.56 5.96   HGB 9.6* 9.1* 9.7*   HCT 29.7* 29.3* 31.2*    213 227       Recent Labs     04/29/24  0358 04/30/24  0329 05/01/24  0325    141 142   K 3.8 3.9 4.3   * 112* 112*   CO2 17* 19* 19*   BUN 54* 36* 30*   CREATININE 1.4 1.1 1.1   GLU 97 86 104   PHOS 2.6* 2.2* 2.4*   MG 2.1 1.8 1.6        No results for input(s): "PH", "PCO2", "PO2", "HCO3", "POCSATURATED", "BE" in the last 72 hours.     Lab Results   Component Value Date    LACTATE 0.9 04/27/2024    LACTATE 1.4 04/26/2024         ASSESSMENT AND PLAN :     Tachycardia    HR increased since 4/30 up to 140s with increased abdominal pain and change in mentation, more confused from yesterday.  Concern for intraabdominal pathology.  Pt pulled out NGT, unable to be replaced by nursing.  Currently unable to travel for CT scan with high risk of aspiration.      --Obtain EKG  --Lactic ordered  --KUB  --Pain control, consider additional IVF  --When HR/pain better controlled obtain CT A/P    Discussed with oncology fellow at bedside.  Family traveling to hospital to have discussions with primary team including ongoing goals of care.  Patient to remain in 801 at this time.  Please call CCM with any additional questions/concerns or change in patient condition.      Uninterrupted Critical Care/Counseling Time (not including procedures): 35 minutes    Plan discussed with PCCM fellow Dr. Cotton.      Critical care was time spent personally by me on the following activities: development of treatment plan with patient or surrogate and bedside caregivers, discussions with consultants, evaluation of patient's response to treatment, examination of patient, ordering and performing treatments and interventions, ordering and review of laboratory studies, ordering and review of radiographic studies, pulse " oximetry, re-evaluation of patient's condition. This critical care time did not overlap with that of any other provider or involve time for any procedures.    Joellen WELDON  Pulmonary Critical Care Medicine  05/01/2024  8:54 AM

## 2024-05-01 NOTE — SUBJECTIVE & OBJECTIVE
Interval History: Worsening mental status, likely development of malignant bowel obstruction but unable to safely verify through imaging due to mental status and tenuous state. Mother appropriately tearful at bedside.    Prior 24 hour MME: 81 MME  Oxycontin 20 mg BID x 1 = 30 MME  Oxycodone 10 mg x 1 = 15 MME  Morphine 4 mg IV x 3 = 36 MME    Medications:  Continuous Infusions:  Current Facility-Administered Medications   Medication Dose Route Frequency Last Rate Last Admin    HYDROmorphone PCA syringe 15 mg/30 mL (0.5 mg/mL) NS - HIGH CONC   Intravenous Continuous   New Syringe/Bag at 05/01/24 0940     Scheduled Meds:  Current Facility-Administered Medications   Medication Dose Route Frequency    scopolamine  1 patch Transdermal Q3 Days     PRN Meds:  Current Facility-Administered Medications:     calcium carbonate, 1,000 mg, Oral, TID PRN    dextrose 10%, 12.5 g, Intravenous, PRN    dextrose 10%, 25 g, Intravenous, PRN    dicyclomine, 20 mg, Oral, BID PRN    glucagon (human recombinant), 1 mg, Intramuscular, PRN    glucose, 16 g, Oral, PRN    glucose, 24 g, Oral, PRN    HYDROmorphone, 0.5 mg, Intravenous, Q1H PRN    hydrOXYzine HCL, 25 mg, Oral, TID PRN    lactulose, 10 g, Oral, Q6H PRN    lorazepam, 0.5 mg, Intravenous, Q30 Min PRN    naloxone, 0.02 mg, Intravenous, PRN    risperiDONE, 2 mg, Oral, Nightly PRN    sodium chloride 0.9%, 10 mL, Intravenous, Q12H PRN    Objective:     Vital Signs (Most Recent):  Temp: 99.5 °F (37.5 °C) (05/01/24 0719)  Pulse: (!) 170 (05/01/24 0948)  Resp: (!) 37 (05/01/24 0948)  BP: 136/76 (05/01/24 0719)  SpO2: (!) 92 % (05/01/24 0948) Vital Signs (24h Range):  Temp:  [98.3 °F (36.8 °C)-99.5 °F (37.5 °C)] 99.5 °F (37.5 °C)  Pulse:  [116-170] 170  Resp:  [8-37] 37  SpO2:  [90 %-94 %] 92 %  BP: (100-144)/(62-78) 136/76     Weight: (!) 162 kg (357 lb 2.3 oz)  Body mass index is 55.94 kg/m².       Physical Exam  Vitals reviewed.   Constitutional:       Appearance: She is not  toxic-appearing.   HENT:      Head: Normocephalic and atraumatic.      Right Ear: External ear normal.      Left Ear: External ear normal.      Nose: Nose normal.      Mouth/Throat:      Mouth: Mucous membranes are moist.   Eyes:      Extraocular Movements: Extraocular movements intact.      Conjunctiva/sclera: Conjunctivae normal.   Neck:      Comments: Trachea midline  Cardiovascular:      Rate and Rhythm: Normal rate.   Pulmonary:      Effort: Pulmonary effort is normal. No respiratory distress.   Abdominal:      General: There is distension.      Tenderness: There is abdominal tenderness.   Musculoskeletal:         General: Swelling present.      Cervical back: Normal range of motion.   Skin:     Coloration: Skin is pale.   Neurological:      General: No focal deficit present.      Mental Status: She is alert. She is disoriented.   Psychiatric:         Mood and Affect: Mood is anxious and depressed. Affect is tearful.            Review of Symptoms      Symptom Assessment (ESAS 0-10 Scale)  Pain:  0  Dyspnea:  0  Anxiety:  0  Nausea:  0  Depression:  0  Anorexia:  0  Fatigue:  0  Insomnia:  0  Restlessness:  0  Agitation:  0  Unable to complete assessment due to Mental status change     CAM / Delirium:  Negative  Constipation:  Positive  Diarrhea:  Negative      Bowel Management Plan (BMP):  Yes      Pain Assessment:  OME in 24 hours:  125  Location(s): leg    Leg       Location: left        Quality: Aching, burning, sharp and pressure-like        Quantity: 8/10 in intensity        Chronicity: Onset 1 (greater than) week(s) ago, unchanged        Aggravating Factors: Activity and walking        Alleviating Factors: Opiates        Associated Symptoms: None    Pain Assessment in Advanced Demential Scale (PAINAD)   Breathing - Independent of vocalization:  0  Negative vocalization:  0  Facial expression:  1  Body language:  0  Consolability:  1  Total:  2    Modified Ramona Scale:  0    Living Arrangements:  Lives with  family and Lives in home    Psychosocial/Cultural:   See Palliative Psychosocial Note: No  Social Issues Identified: Coping deficit pt/family and Mental Health  Bereavement Risk: No  Caregiver Needs Discussed. Caregiver Distress: No: n/a  Cultural: patient enjoys shopping; she has three children (2 sons and 1 daughter). Has good support with her mother and close friends.   **Primary  to Follow**  Palliative Care  Consult: No    Spiritual:  F - Marilyn and Belief:  Adventism - Jain  I - Importance:  Yes  C - Community:  Has   A - Address in Care:  Palliative  involved        Advance Care Planning   Advance Directives:   Living Will: No    LaPOST: No    Do Not Resuscitate Status: Yes    Medical Power of : No        Oral Declaration: Yes  Agent's Name:  Star Mckinnon   Agent's Contact Number:  [phone]    Decision Making:  Patient answered questions  Goals of Care: What is most important right now is to focus on remaining as independent as possible, symptom/pain control, extending life as long as possible, even it it means sacrificing quality. Accordingly, we have decided that the best plan to meet the patient's goals includes continuing with treatment.         Significant Labs: All pertinent labs within the past 24 hours have been reviewed.  CBC:   Recent Labs   Lab 05/01/24  0325   WBC 5.96   HGB 9.7*   HCT 31.2*   MCV 97        BMP:  Recent Labs   Lab 05/01/24 0325         K 4.3   *   CO2 19*   BUN 30*   CREATININE 1.1   CALCIUM 10.7*   MG 1.6     LFT:  Lab Results   Component Value Date    AST 23 05/01/2024    ALKPHOS 108 05/01/2024    BILITOT 0.9 05/01/2024     Albumin:   Albumin   Date Value Ref Range Status   05/01/2024 1.8 (L) 3.5 - 5.2 g/dL Final     Protein:   Total Protein   Date Value Ref Range Status   05/01/2024 5.9 (L) 6.0 - 8.4 g/dL Final     Lactic acid:   Lab Results   Component Value Date    LACTATE 1.4 05/01/2024     "LACTATE 0.9 04/27/2024       Significant Imaging: I have reviewed all pertinent imaging results/findings within the past 24 hours.    04/20/2024 US retroperitoneal: "Moderate bilateral hydronephrosis, corresponding to findings on prior CT performed 03/11/2024. At least 1 nonobstructing right renal calculus is noted.  Additional smaller no calculi seen on prior CT are not well characterized. Nonspecific increased attenuation dependently within the urinary bladder.  No definite internal vascularity on single provided image.  Findings are nonspecific and could relate to debris and/or hemorrhage.  Soft tissue mass would be difficult to exclude on limited images.  Cross-sectional imaging could be considered for further characterization."   "

## 2024-05-01 NOTE — PROGRESS NOTES
[facility] - Oncology ([facility])  Palliative Medicine  Progress Note    Patient Name: Gail Mckinnon  MRN: [id number]  Admission Date: 4/19/2024  Hospital Length of Stay: 11 days  Code Status: DNR   Attending Provider: Frances Luong MD  Consulting Provider: Raquel Vicente MD  Primary Care Physician: Marah Santo MD  Principal Problem:Acute hypoxemic respiratory failure    Patient information was obtained from patient, parent, and primary team.      Assessment/Plan:     Palliative Care  Palliative care encounter  Ms. 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 Of note, patient's recent imaging showed evidence of progression. Outpatient plan to start fruquintinib. In ED patient noted to have MARILOU. Imaging showed tumor involvement at left hip iliopsoas. S/p attempt for bilateral ureteral stents that was unsuccessful, followed by IR-guided bilateral nephrostomy tube placements for hydronephrosis on 4/22/24. Stepped up to the MICU on 4/26 for acute respiratory failure, stepped down on 4/28 for resolution of respiratory distress. 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 Palliative Medicine has been following for malignant symptom management.    Sigmoid cancer/MARILOU/bilateral hydronephrosis/HTN/thyroid problem/other medical problems  - plan per primary team and other speciality consultants (Urology)    Cancer related pain  - patient reporting severe pain in left hip; she also has pain at times in abdomen/groin and acute tenderness from new bilateral nephrostomy tube placements  - 24 hour OME: 81 MME (had been consistently requiring >200 MME/day prior to decompensation)  - outpatient regimen: oxycodone-apap  mg q4h prn, gabapentin 300 mg qhs, and flexeril 5 mg q8h prn.  This was not providing any relief.  - Discontinue Oxycontin 20 mg PO BID in setting of PO intolerance/malignant bowel obstruction  - Discontinue oxycodone 10 mg PO q4h PRN severe pain as above  - Start hydromorphone PCA   - Demand: 0.2 mg   - Lock out: 10 minutes   - Continuous: 0.2 mg   - Max: 1.4 mg/hr  - Will likely need escalation, but will start with this at baseline and titrate appropriately to achieve adequate pain control  - Until PCA can be started, continue hydromorphone 0.5 mg IV q1h PRN severe pain behaviors, breakthrough pain  - May need 1 mg dose as this was the only effective regimen last week when she initially met inpatient palliative   - Psych/onc consulted for anxiety component driving uncontrolled pain  - Rad/onc engaged, performed simulation on 4/23  - Continue gabapentin 300 mg PO qHS (unable to increase due to creatinine clearance)    Nausea/Anorexia  - patient with increased nausea due to increased pain  - patient with poor appetite constantly  - would benefit from nutrition consult while in the hospital  - continue zofran 4 mg q8h prn and prochlorperazine 10 mg q6h prn    Constipation 2/2 likely malignant bowel obstruction  - GI rest, primary team appropriately pursued NG decompression which patient pulled out    Anxiety/depression/spiritual distress  - Takes lorazepam 1 mg PO BID PRN anxiety at home, verified via   - Agree with lorazepam 1 mg IV q6h PRN anxiety, nausea  - In setting of lorazepam IV shortage, may need to pivot to diazepam IV  - Psych/onc consulted    Acute encephalopathy  - Likely multifactorial, considering dramatic decrease in opioid administration, initiated when she acutely decompensated for acute respiratory failure  - Content of speech is very paranoid, this is not her baseline  - QTc elevated  - Renal function markedly recovering  - Pain and anxiety management as above    Goals of Care  - Will return today 5/1/24 to support family in navigating next steps, explore  comfort focused care        I will follow-up with patient. Please contact us if you have any additional questions.    Subjective:     Chief Complaint:   Chief Complaint   Patient presents with    Leg Pain     Left upper thigh pain and swelling. Difficulty ambulating. Denies trauma. On chemo for metastatic colon cancer.         HPI:   Ms. 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 Of note, patient's recent imaging showed evidence of progression. Outpatient plan to start fruquintinib. In ED patient noted to have MARILOU. Imaging showed tumor involvement at left hip iliopsoas. Patient admitted for pain management and MARILOU. Palliative Medicine consulted for symptom management.    Hospital Course:  No notes on file    Interval History: Worsening mental status, likely development of malignant bowel obstruction but unable to safely verify through imaging due to mental status and tenuous state. Mother appropriately tearful at bedside.    Prior 24 hour MME: 81 MME  Oxycontin 20 mg BID x 1 = 30 MME  Oxycodone 10 mg x 1 = 15 MME  Morphine 4 mg IV x 3 = 36 MME    Medications:  Continuous Infusions:  Current Facility-Administered Medications   Medication Dose Route Frequency Last Rate Last Admin    HYDROmorphone PCA syringe 15 mg/30 mL (0.5 mg/mL) NS - HIGH CONC   Intravenous Continuous   New Syringe/Bag at 05/01/24 0940     Scheduled Meds:  Current Facility-Administered Medications   Medication Dose Route Frequency    scopolamine  1 patch Transdermal Q3 Days     PRN Meds:  Current Facility-Administered Medications:     calcium carbonate, 1,000 mg, Oral, TID PRN    dextrose 10%, 12.5 g, Intravenous, PRN    dextrose 10%, 25 g, Intravenous, PRN    dicyclomine, 20 mg, Oral, BID PRN    glucagon (human recombinant), 1 mg, Intramuscular, PRN    glucose, 16 g, Oral, PRN    glucose, 24 g, Oral, PRN    HYDROmorphone, 0.5  mg, Intravenous, Q1H PRN    hydrOXYzine HCL, 25 mg, Oral, TID PRN    lactulose, 10 g, Oral, Q6H PRN    lorazepam, 0.5 mg, Intravenous, Q30 Min PRN    naloxone, 0.02 mg, Intravenous, PRN    risperiDONE, 2 mg, Oral, Nightly PRN    sodium chloride 0.9%, 10 mL, Intravenous, Q12H PRN    Objective:     Vital Signs (Most Recent):  Temp: 99.5 °F (37.5 °C) (05/01/24 0719)  Pulse: (!) 170 (05/01/24 0948)  Resp: (!) 37 (05/01/24 0948)  BP: 136/76 (05/01/24 0719)  SpO2: (!) 92 % (05/01/24 0948) Vital Signs (24h Range):  Temp:  [98.3 °F (36.8 °C)-99.5 °F (37.5 °C)] 99.5 °F (37.5 °C)  Pulse:  [116-170] 170  Resp:  [8-37] 37  SpO2:  [90 %-94 %] 92 %  BP: (100-144)/(62-78) 136/76     Weight: (!) 162 kg (357 lb 2.3 oz)  Body mass index is 55.94 kg/m².       Physical Exam  Vitals reviewed.   Constitutional:       Appearance: She is not toxic-appearing.   HENT:      Head: Normocephalic and atraumatic.      Right Ear: External ear normal.      Left Ear: External ear normal.      Nose: Nose normal.      Mouth/Throat:      Mouth: Mucous membranes are moist.   Eyes:      Extraocular Movements: Extraocular movements intact.      Conjunctiva/sclera: Conjunctivae normal.   Neck:      Comments: Trachea midline  Cardiovascular:      Rate and Rhythm: Normal rate.   Pulmonary:      Effort: Pulmonary effort is normal. No respiratory distress.   Abdominal:      General: There is distension.      Tenderness: There is abdominal tenderness.   Musculoskeletal:         General: Swelling present.      Cervical back: Normal range of motion.   Skin:     Coloration: Skin is pale.   Neurological:      General: No focal deficit present.      Mental Status: She is alert. She is disoriented.   Psychiatric:         Mood and Affect: Mood is anxious and depressed. Affect is tearful.            Review of Symptoms      Symptom Assessment (ESAS 0-10 Scale)  Pain:  0  Dyspnea:  0  Anxiety:  0  Nausea:  0  Depression:  0  Anorexia:  0  Fatigue:  0  Insomnia:   0  Restlessness:  0  Agitation:  0  Unable to complete assessment due to Mental status change     CAM / Delirium:  Negative  Constipation:  Positive  Diarrhea:  Negative      Bowel Management Plan (BMP):  Yes      Pain Assessment:  OME in 24 hours:  125  Location(s): leg    Leg       Location: left        Quality: Aching, burning, sharp and pressure-like        Quantity: 8/10 in intensity        Chronicity: Onset 1 (greater than) week(s) ago, unchanged        Aggravating Factors: Activity and walking        Alleviating Factors: Opiates        Associated Symptoms: None    Pain Assessment in Advanced Demential Scale (PAINAD)   Breathing - Independent of vocalization:  0  Negative vocalization:  0  Facial expression:  1  Body language:  0  Consolability:  1  Total:  2    Modified Ramona Scale:  0    Living Arrangements:  Lives with family and Lives in home    Psychosocial/Cultural:   See Palliative Psychosocial Note: No  Social Issues Identified: Coping deficit pt/family and Mental Health  Bereavement Risk: No  Caregiver Needs Discussed. Caregiver Distress: No: n/a  Cultural: patient enjoys shopping; she has three children (2 sons and 1 daughter). Has good support with her mother and close friends.   **Primary  to Follow**  Palliative Care  Consult: No    Spiritual:  F - Marilyn and Belief:  Mosque - Gnosticism  I - Importance:  Yes  C - Community:  Has   A - Address in Care:  Palliative  involved        Advance Care Planning  Advance Directives:   Living Will: No    LaPOST: No    Do Not Resuscitate Status: Yes    Medical Power of : No        Oral Declaration: Yes  Agent's Name:  Star Mckinnon   Agent's Contact Number:  [phone]    Decision Making:  Patient answered questions  Goals of Care: What is most important right now is to focus on remaining as independent as possible, symptom/pain control, extending life as long as possible, even it it means sacrificing  "quality. Accordingly, we have decided that the best plan to meet the patient's goals includes continuing with treatment.         Significant Labs: All pertinent labs within the past 24 hours have been reviewed.  CBC:   Recent Labs   Lab 05/01/24 0325   WBC 5.96   HGB 9.7*   HCT 31.2*   MCV 97        BMP:  Recent Labs   Lab 05/01/24 0325         K 4.3   *   CO2 19*   BUN 30*   CREATININE 1.1   CALCIUM 10.7*   MG 1.6     LFT:  Lab Results   Component Value Date    AST 23 05/01/2024    ALKPHOS 108 05/01/2024    BILITOT 0.9 05/01/2024     Albumin:   Albumin   Date Value Ref Range Status   05/01/2024 1.8 (L) 3.5 - 5.2 g/dL Final     Protein:   Total Protein   Date Value Ref Range Status   05/01/2024 5.9 (L) 6.0 - 8.4 g/dL Final     Lactic acid:   Lab Results   Component Value Date    LACTATE 1.4 05/01/2024    LACTATE 0.9 04/27/2024       Significant Imaging: I have reviewed all pertinent imaging results/findings within the past 24 hours.    04/20/2024 US retroperitoneal: "Moderate bilateral hydronephrosis, corresponding to findings on prior CT performed 03/11/2024. At least 1 nonobstructing right renal calculus is noted.  Additional smaller no calculi seen on prior CT are not well characterized. Nonspecific increased attenuation dependently within the urinary bladder.  No definite internal vascularity on single provided image.  Findings are nonspecific and could relate to debris and/or hemorrhage.  Soft tissue mass would be difficult to exclude on limited images.  Cross-sectional imaging could be considered for further characterization."     I spent a total of 50 minutes on the day of the visit. This includes face to face time in discussion of goals of care, symptom assessment, coordination of care and emotional support. This also includes non-face to face time preparing to see the patient (eg, review of tests/imaging), obtaining and/or reviewing separately obtained history, documenting " clinical information in the electronic or other health record, independently interpreting results and communicating results to the patient/family/caregiver, or care coordinator.    Raquel Vicente MD  Palliative Medicine  [facility] - Oncology ([facility])

## 2024-05-01 NOTE — SUBJECTIVE & OBJECTIVE
Oncology Treatment Plan:   [No matching plan found]    Medications:  Continuous Infusions:  Scheduled Meds:  PRN Meds:     Review of patient's allergies indicates:   Allergen Reactions    Oxaliplatin Other (See Comments)     Itchy hands, throat closed up, trouble hearing - during infusion    Penicillins Hives and Rash     childhood allergy          Past Medical History:   Diagnosis Date    Anxiety     Depression     FH: ovarian cancer 2020    Hx of psychiatric care     Effexor, Paxil, Lexapro, Zoloft, Wellbutrin, Trazodone Buspar    Hypertension     Hyperthyroidism     Hypothyroid     Kidney calculi     Malignant neoplasm of sigmoid colon 2020    Menorrhagia     Multinodular goiter 2012    Palpitation     Psychiatric problem     Venous insufficiency     Vulvar lesion      Past Surgical History:   Procedure Laterality Date     SECTION, CLASSIC      x3    COLON SURGERY      COLONOSCOPY N/A 2020    Procedure: COLONOSCOPY;  Surgeon: Shane Parker MD;  Location: [facility];  Service: Endoscopy;  Laterality: N/A;    COLONOSCOPY N/A 2022    Procedure: COLONOSCOPY;  Surgeon: Shane Parker MD;  Location: [facility];  Service: Endoscopy;  Laterality: N/A;    COLONOSCOPY N/A 2023    Procedure: COLONOSCOPY;  Surgeon: Shane Parker MD;  Location: [facility];  Service: Endoscopy;  Laterality: N/A;  no cecum anastomosis    CYSTOSCOPY N/A 2024    Procedure: CYSTOSCOPY;  Surgeon: Willie Bailey Jr., MD;  [facility];  Service: Urology;  Laterality: N/A;    CYSTOSCOPY W/ URETERAL STENT PLACEMENT Bilateral 2020    Procedure: CYSTOSCOPY, WITH URETERAL STENT INSERTION;  Surgeon: Claudio Tyson MD;  Location: [facility] OR 2ND FLR;  Service: Urology;  Laterality: Bilateral;    ESOPHAGOGASTRODUODENOSCOPY N/A 2024    Procedure: EGD (ESOPHAGOGASTRODUODENOSCOPY);  Surgeon: Shane Parker MD;  Location: [facility];  Service: Gastroenterology;  Laterality:  N/A;    EXAMINATION UNDER ANESTHESIA N/A 01/16/2024    Procedure: Exam under anesthesia-vagina;  Surgeon: Eli Her MD;  Location: [facility] OR;  Service: OB/GYN;  Laterality: N/A;    EXCISION-WIDE LOCAL Left 01/16/2024    Procedure: EXCISION-WIDE LOCAL -  Labia Majora;  Surgeon: Eli Her MD;  Location: [facility] OR;  Service: OB/GYN;  Laterality: Left;  upper left side of labia majora    EXCISIONAL BIOPSY, LYMPH NODE  07/2022    abdominal x 4    INSERTION OF TUNNELED CENTRAL VENOUS CATHETER (CVC) WITH SUBCUTANEOUS PORT N/A 05/01/2020    Procedure: [id number];  Surgeon: Dosc Diagnostic Provider;  Location: [facility] OR 2ND FLR;  Service: Radiology;  Laterality: N/A;  Room 189/[facility]    LAPAROSCOPIC SIGMOIDECTOMY N/A 03/25/2020    Procedure: COLECTOMY, SIGMOID, LAPAROSCOPIC, flex sig, ERAS high;  Surgeon: Silvio Man MD;  Location: [facility] OR 2ND FLR;  Service: Colon and Rectal;  Laterality: N/A;    MOBILIZATION OF SPLENIC FLEXURE  03/25/2020    Procedure: MOBILIZATION, SPLENIC FLEXURE;  Surgeon: Silvio Man MD;  Location: [facility] OR 2ND FLR;  Service: Colon and Rectal;;    TONSILLECTOMY      TOTAL ABDOMINAL HYSTERECTOMY W/ BILATERAL SALPINGOOPHORECTOMY N/A 03/25/2020    Procedure: HYSTERECTOMY, TOTAL, ABDOMINAL, WITH BILATERAL SALPINGO-OOPHORECTOMY;  Surgeon: Keron Brady MD;  Location: [facility] OR 2ND FLR;  Service: Oncology;  Laterality: N/A;     Family History       Problem Relation (Age of Onset)    Cancer Maternal Aunt, Paternal Uncle, Maternal Grandmother    Colon cancer Paternal Uncle    Diabetes Mother (65)    Drug abuse Brother, Brother, Son, Son    Heart disease Father    No Known Problems Daughter    Ovarian cancer Maternal Aunt, Maternal Aunt, Maternal Grandmother, Cousin          Tobacco Use    Smoking status: Never    Smokeless tobacco: Never   Substance and Sexual Activity    Alcohol use: Yes     Comment: occasionally- twice monthly    Drug use: Never    Sexual activity: Yes      Partners: Male     Birth control/protection: See Surgical Hx       Review of Systems   Unable to perform ROS: Acuity of condition     Objective:     Vital Signs (Most Recent):    Vital Signs (24h Range):  Temp:  [98.3 °F (36.8 °C)-99.5 °F (37.5 °C)] 99.5 °F (37.5 °C)  Pulse:  [127-170] 170  Resp:  [8-37] 37  SpO2:  [90 %-94 %] 92 %  BP: (100-144)/(62-76) 136/76        There is no height or weight on file to calculate BMI.  There is no height or weight on file to calculate BSA.    No intake or output data in the 24 hours ending 05/01/24 1413     Physical Exam Deferred due to comfort care.     Significant Labs:   None    Diagnostic Results:  None

## 2024-05-02 NOTE — CHAPLAIN
05/01/24 2004   Clinical Encounter Type   Visit Type Initial Visit   Visit Category Death   Visited With Patient and family together   Number of Family Visited 6   Length of Visit 10 Minutes   Referral From Palliative care   Referral To    Patient Spiritual Encounters   Care Provided Decedent Care   Family Spiritual Encounters   Care Provided Grief care   Family Coping Open/discussion;Active miah;Internal strength   Comments - Family I met with the patient's family and provided them with grief care. had an open discussion regarding Pt's life experiences. family appreciated the team's support. spiritual and supportive care provided.

## 2024-05-10 NOTE — PROGRESS NOTES
Review of Symptoms      Symptom Assessment (ESAS 0-10 Scale)  Pain:  2  Dyspnea:  0  Anxiety:  4  Nausea:  0  Depression:  6  Anorexia:  0  Fatigue:  6  Insomnia:  7  Restlessness:  1  Agitation:  5         Pain Assessment:    Location(s): none (see note)      Psychosocial/Cultural:   See Palliative Psychosocial Note: Yes  **Primary  to Follow**  Palliative Care  Consult: No          Answers submitted by the patient for this visit:  Review of Systems Questionnaire (Submitted on 10/18/2023)  appetite change : No  unexpected weight change: No  mouth sores: No  visual disturbance: No  cough: Yes  shortness of breath: No  chest pain: No  abdominal pain: No  diarrhea: No  frequency: No  back pain: No  rash: No  headaches: No  adenopathy: No  nervous/ anxious: No     [EDS: ESS=____] : daytime somnolence: ESS=[unfilled] [Fatigue] : fatigue [Snoring] : snoring [Negative] : Psychiatric [Lower Extremity Discomfort] : no lower extremity discomfort [Late day/ Evening symptoms] : no late day/evening symptoms [Unusual Sleep Behavior] : no unusual sleep behavior [Hypersomnolence] : not sleeping much more than usual [Cataplexy] :  no cataplexy

## 2024-07-08 NOTE — PROGRESS NOTES
PROGRESS NOTE    Subjective:       Patient ID: Gail Mckinnon is a 51 y.o. female.  MRN: [id number]  : 1970    Chief Complaint: Malignant neoplasm of sigmoid colon    History of Present Illness:   Gail Mckinnon is a 51 y.o. female who presents with  colon cancer, initially stage III and now with LN recurrence.       She completed adjuvant FOLFOX in 2020. She tolerated only 4 cycles of FOLFOX before she developed an infusion reaction to oxaliplatin in cycle 5 was well as cycle 6. She then completed 6 cycles of infusional 5 FU.     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 She is now on second line therapy with FOLFIRI and Avastin.      She presented to the ED on  with left flank pain. CT renal stone study showed Moderate hydronephrosis on the left secondary to 3 mm calculus at the UPJ.     Interim history:  Denies any symptoms, previously having nausea and occasional vomiting with chemotherapy but has not used the Sancuso patch yet.       Oncology History:  Oncology History   Malignant neoplasm of sigmoid colon   3/16/2020 Initial Diagnosis    Malignant neoplasm of sigmoid colon     3/31/2020 Cancer Staged    Staging form: Colon and Rectum, AJCC 8th Edition  - Clinical stage from 3/31/2020: Stage IIIC (cT4b, cN2a, cM0)     2020 - 2020 Chemotherapy    Treatment Summary   Plan Name: OP FOLFOX 6 Q2W  Treatment Goal: Curative  Status: Inactive  Start Date: 2020  End Date: 2020  Provider: Dylan Leyva MD  Chemotherapy: fluorouraciL injection 945 mg, 400 mg/m2 = 945 mg, Intravenous, Clinic/[facility] 1 time, 14 of 14 cycles  Administration: 945 mg (2020), 945 mg (2020), 945 mg (6/3/2020), 945 mg (2020), 945 mg (2020), 945 mg (2020), 945 mg (2020), 945 mg (2020), 945 mg (2020), 945 mg (10/6/2020), 945 mg (10/21/2020), 945 mg (2020)  fluorouraciL      [facility]  INPATIENT WOUND & OSTOMY CONSULTATION    Today's Date: 07/08/24    Patient Name: Luciano Christiansen  MRN: [id number]  CSN: [id number]  PCP: Jossy Christiansen APRN  Referring Provider:   Consulting Provider (From admission, onward)      Start Ordered     Status Ordering Provider    07/05/24 1306 07/05/24 1306  Inpatient Wound Care MD Consult  Once        Specialty:  Wound Care  Provider:  Yvette Henderson APRN    Acknowledged DEMARCO GERARDO           Attending Provider: Marcus Pantoja MD  Length of Stay: 4    SUBJECTIVE   Chief Complaint: Skin breakdown of scrotum    HPI: Luciano Christiansen, a 37 y.o.male, presents with a past medical history of ***.  A full past medical history as listed below.  Inpatient wound care consulted due to skin breakdown of scrotum.  Patient has significant scrotal edema at this time.  He states with scooting across bed it caused slight bleeding of the posterior side of scrotum.  Currently nystatin is ordered but there are no signs of yeast involvement at this time.      Visit Dx:    ICD-10-CM ICD-9-CM   1. Alcoholic intoxication without complication  F10.920 305.00   2. Hyponatremia  E87.1 276.1   3. Hypomagnesemia  E83.42 275.2   4. Hypokalemia  E87.6 276.8   5. Hyperbilirubinemia  E80.6 782.4   6. Hypophosphatemia  E83.39 275.3   7. Elevated troponin  R79.89 790.6   8. Jaundice  R17 782.4       Hospital Problem List:     Alcohol withdrawal    Primary hypertension    Fatty liver    Hyponatremia    Hypokalemia    Hypomagnesemia    Elevated lactic acid level    Alcoholism    Transaminitis    Alcoholic encephalopathy    Type 2 diabetes mellitus    Alcoholic steatohepatitis    BMI 40.0-44.9, adult      History:   Past Medical History:   Diagnosis Date    Diabetes mellitus     Gout     Hypertension      Past Surgical History:   Procedure Laterality Date    VASECTOMY       Social History     Socioeconomic History    Marital status: Single   Tobacco Use    Smoking  2,400 mg/m2 = 5,665 mg in sodium chloride 0.9% 240 mL chemo infusion, 2,400 mg/m2 = 5,665 mg, Intravenous, Over 46 hours, 14 of 14 cycles  Administration: 5,665 mg (5/6/2020), 5,665 mg (5/20/2020), 5,665 mg (6/3/2020), 5,665 mg (6/17/2020), 5,665 mg (7/29/2020), 5,665 mg (8/12/2020), 5,665 mg (8/26/2020), 5,665 mg (9/9/2020), 5,665 mg (9/23/2020), 5,665 mg (10/6/2020), 5,665 mg (10/21/2020), 5,665 mg (11/4/2020)  leucovorin calcium 900 mg in dextrose 5 % 250 mL infusion, 945 mg, Intravenous, Clinic/[facility] 1 time, 14 of 14 cycles  Administration: 900 mg (5/6/2020), 900 mg (5/20/2020), 900 mg (6/3/2020), 900 mg (6/17/2020), 945 mg (6/30/2020), 945 mg (7/20/2020), 945 mg (7/29/2020), 945 mg (8/12/2020), 945 mg (8/26/2020), 945 mg (9/9/2020), 945 mg (9/23/2020), 945 mg (10/6/2020), 945 mg (10/21/2020), 945 mg (11/4/2020)  oxaliplatin (ELOXATIN) 200 mg in dextrose 5 % 500 mL chemo infusion, 201 mg, Intravenous, Clinic/[facility] 1 time, 6 of 6 cycles  Dose modification: 65 mg/m2 (original dose 85 mg/m2, Cycle 6)  Administration: 200 mg (5/6/2020), 200 mg (5/20/2020), 200 mg (6/3/2020), 200 mg (6/17/2020), 201 mg (6/30/2020), 150 mg (7/20/2020)     6/16/2021 -  Chemotherapy    Treatment Summary   Plan Name: OP COLORECTAL FOLFIRI + BEVACIZUMAB Q2W  Treatment Goal: Control  Status: Active  Start Date: 6/16/2021  End Date: 3/18/2022 (Planned)  Provider: Marah Santo MD  Chemotherapy: fluorouraciL injection 990 mg, 400 mg/m2 = 990 mg, Intravenous, Clinic/[facility] 1 time, 15 of 15 cycles  Administration: 990 mg (6/16/2021), 990 mg (6/30/2021), 990 mg (7/14/2021), 980 mg (7/28/2021), 990 mg (8/11/2021), 990 mg (8/25/2021), 990 mg (9/8/2021), 990 mg (9/22/2021), 990 mg (10/6/2021), 990 mg (10/20/2021), 990 mg (11/3/2021), 990 mg (12/1/2021), 990 mg (12/15/2021), 990 mg (11/17/2021), 990 mg (12/28/2021)  fluorouraciL 2,400 mg/m2 = 5,950 mg in sodium chloride 0.9% 240 mL chemo infusion, 2,400 mg/m2 = 5,950 mg, Intravenous, Over 46 hours, 17  status: Some Days     Current packs/day: 0.50     Average packs/day: 0.5 packs/day for 14.5 years (7.3 ttl pk-yrs)     Types: Cigarettes     Start date: 2010    Smokeless tobacco: Never   Vaping Use    Vaping status: Every Day    Substances: Nicotine, THC    Devices: Disposable    Passive vaping exposure: Yes   Substance and Sexual Activity    Alcohol use: Yes     Alcohol/week: 12.0 standard drinks of alcohol     Types: 12 Cans of beer per week     Comment: REPORTS TWO BEERS TODAY    Drug use: Yes     Types: Marijuana    Sexual activity: Yes     History reviewed. No pertinent family history.    Allergies:  No Known Allergies    Medications:    Current Facility-Administered Medications:     benzonatate (TESSALON) capsule 200 mg, 200 mg, Oral, TID PRN, Peter Tabor MD, 200 mg at 07/07/24 1148    sennosides-docusate (PERICOLACE) 8.6-50 MG per tablet 2 tablet, 2 tablet, Oral, Nightly PRN **AND** polyethylene glycol (MIRALAX) packet 17 g, 17 g, Oral, Daily PRN **AND** bisacodyl (DULCOLAX) EC tablet 5 mg, 5 mg, Oral, Daily PRN **AND** bisacodyl (DULCOLAX) suppository 10 mg, 10 mg, Rectal, Daily PRN, Peter Tabor MD    Calcium Replacement - Follow Nurse / BPA Driven Protocol, , Does not apply, PRN, Neema Dan, LÁZARO    chlordiazePOXIDE (LIBRIUM) capsule 10 mg, 10 mg, Oral, Q8H, Peter Tabor MD, 10 mg at 07/08/24 0637    folic acid (FOLVITE) tablet 1 mg, 1 mg, Oral, Daily, Wes Hilton APRN, 1 mg at 07/08/24 0935    furosemide (LASIX) injection 40 mg, 40 mg, Intravenous, Q12H, Peter Tabor MD, 40 mg at 07/08/24 0339    HYDROcodone-acetaminophen (NORCO) 5-325 MG per tablet 1 tablet, 1 tablet, Oral, Q6H PRN, Peter Tabor MD, 1 tablet at 07/08/24 0935    hydrOXYzine (ATARAX) tablet 25 mg, 25 mg, Oral, TID PRN, Reyes Joshi MD, 25 mg at 07/08/24 0935    lactulose solution 20 g, 20 g, Oral, BID, Peter Tabor MD, 20 g at 07/08/24 0939    Magnesium Standard Dose Replacement - Follow Nurse / BPA  Driven Protocol, , Does not apply, PRN, Wes Hilton APRN    nicotine (NICODERM CQ) 14 MG/24HR patch 1 patch, 1 patch, Transdermal, Q24H, Peter Tabor MD, 1 patch at 24 1506    nitroglycerin (NITROSTAT) SL tablet 0.4 mg, 0.4 mg, Sublingual, Q5 Min PRN, Wes Hilton APRN    ondansetron ODT (ZOFRAN-ODT) disintegrating tablet 4 mg, 4 mg, Oral, Q6H PRN **OR** ondansetron (ZOFRAN) injection 4 mg, 4 mg, Intravenous, Q6H PRN, Wes Hilton APRN, 4 mg at 24 2118    pantoprazole (PROTONIX) EC tablet 40 mg, 40 mg, Oral, Q AM, Wes Hilton APRN, 40 mg at 24 0637    pentoxifylline (TRENtal) CR tablet 400 mg, 400 mg, Oral, TID With Meals, Wes Hilton APRN, 400 mg at 24 0935    Phosphorus Replacement - Follow Nurse / BPA Driven Protocol, , Does not apply, PRN, Neema Dan APRN    potassium chloride (MICRO-K/KLOR-CON) CR capsule, 40 mEq, Oral, Q4H, Peter Tabor MD, 40 mEq at 24 0637    Potassium Replacement - Follow Nurse / BPA Driven Protocol, , Does not apply, PRN, Neema Dan APRN    QUEtiapine (SEROquel) tablet 50 mg, 50 mg, Oral, Nightly, Wes Hilton APRN, 50 mg at 24 2212    riFAXIMin (XIFAXAN) tablet 550 mg, 550 mg, Oral, Q12H, Peter Tabor MD, 550 mg at 24 0937    [COMPLETED] Insert Peripheral IV, , , Once **AND** sodium chloride 0.9 % flush 10 mL, 10 mL, Intravenous, PRN, Justino Walker Jr., MD    sodium chloride 0.9 % flush 10 mL, 10 mL, Intravenous, Q12H, Wes Hilton APRN, 10 mL at 24 0939    sodium chloride 0.9 % flush 10 mL, 10 mL, Intravenous, PRN, Wes Hilton APRN    sodium chloride 0.9 % infusion 40 mL, 40 mL, Intravenous, PRN, Wes Hilton APRN    [] thiamine (B-1) 500 mg in sodium chloride 0.9 % 100 mL IVPB, 500 mg, Intravenous, Q8H, Stopped at 24 0700 **FOLLOWED BY** thiamine (B-1) injection 200 mg, 200 mg, Intravenous, Q8H, 200 mg at 24 0637 **FOLLOWED BY** [START ON  of 20 cycles  Administration: 5,950 mg (6/16/2021), 5,950 mg (6/30/2021), 5,950 mg (7/14/2021), 5,880 mg (7/28/2021), 5,930 mg (8/11/2021), 5,930 mg (8/25/2021), 5,930 mg (9/8/2021), 5,930 mg (9/22/2021), 5,930 mg (10/6/2021), 5,930 mg (10/20/2021), 5,930 mg (11/3/2021), 5,930 mg (12/1/2021), 5,930 mg (12/15/2021), 5,930 mg (11/17/2021), 5,930 mg (12/28/2021), 5,930 mg (2/2/2022), 5,930 mg (1/19/2022)  bevacizumab (AVASTIN) 600 mg in sodium chloride 0.9% 100 mL chemo infusion, 660 mg, Intravenous, Clinic/HOD 1 time, 17 of 20 cycles  Administration: 600 mg (6/30/2021), 600 mg (7/14/2021), 600 mg (7/28/2021), 600 mg (6/16/2021), 600 mg (8/11/2021), 655 mg (8/25/2021), 600 mg (9/8/2021), 600 mg (9/22/2021), 600 mg (10/6/2021), 600 mg (10/20/2021), 600 mg (11/3/2021), 655 mg (12/1/2021), 600 mg (12/15/2021), 600 mg (11/17/2021), 600 mg (12/28/2021), 600 mg (2/2/2022), 600 mg (1/19/2022)  irinotecan (CAMPTOSAR) 440 mg in sodium chloride 0.9% 500 mL chemo infusion, 446 mg, Intravenous, Clinic/[facility] 1 time, 17 of 20 cycles  Administration: 440 mg (6/16/2021), 440 mg (6/30/2021), 440 mg (7/14/2021), 440 mg (7/28/2021), 440 mg (8/11/2021), 444 mg (8/25/2021), 440 mg (9/8/2021), 440 mg (9/22/2021), 440 mg (10/6/2021), 440 mg (10/20/2021), 440 mg (11/3/2021), 440 mg (12/1/2021), 440 mg (12/15/2021), 440 mg (11/17/2021), 440 mg (12/28/2021), 440 mg (2/2/2022), 440 mg (1/19/2022)  leucovorin calcium 400 mg/m2 = 990 mg in dextrose 5 % 250 mL infusion, 400 mg/m2 = 990 mg, Intravenous, Clinic/[facility] 1 time, 17 of 20 cycles  Administration: 990 mg (6/16/2021), 990 mg (6/30/2021), 990 mg (7/14/2021), 980 mg (7/28/2021), 990 mg (8/11/2021), 990 mg (8/25/2021), 990 mg (9/8/2021), 990 mg (9/22/2021), 990 mg (10/6/2021), 990 mg (10/20/2021), 990 mg (11/3/2021), 990 mg (12/1/2021), 990 mg (12/15/2021), 990 mg (11/17/2021), 990 mg (12/28/2021), 990 mg (2/2/2022), 990 mg (1/19/2022)     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   4/3/2020 Initial  7/11/2024] thiamine (VITAMIN B-1) tablet 100 mg, 100 mg, Oral, Daily, Wes Hilton APRN    OBJECTIVE     Vitals:    07/08/24 0749   BP: 117/51   Pulse: 92   Resp: 18   Temp: 98 °F (36.7 °C)   SpO2: 93%       PHYSICAL EXAM: ***  Physical Exam       Results Review:  Lab Results (last 48 hours)       Procedure Component Value Units Date/Time    Ammonia [[id number]]  (Abnormal) Collected: 07/08/24 0518    Specimen: Blood Updated: 07/08/24 0554     Ammonia 102 umol/L     Potassium [[id number]]  (Normal) Collected: 07/08/24 0518    Specimen: Blood Updated: 07/08/24 0554     Potassium 3.5 mmol/L     Comprehensive Metabolic Panel [[id number]]  (Abnormal) Collected: 07/08/24 0518    Specimen: Blood Updated: 07/08/24 0554     Glucose 84 mg/dL      BUN 8 mg/dL      Creatinine 0.99 mg/dL      Sodium 137 mmol/L      Potassium 3.5 mmol/L      Chloride 98 mmol/L      CO2 27.0 mmol/L      Calcium 8.4 mg/dL      Total Protein 6.6 g/dL      Albumin 3.0 g/dL      ALT (SGPT) 26 U/L      AST (SGOT) 55 U/L      Alkaline Phosphatase 198 U/L      Total Bilirubin 6.0 mg/dL      Globulin 3.6 gm/dL      A/G Ratio 0.8 g/dL      BUN/Creatinine Ratio 8.1     Anion Gap 12.0 mmol/L      eGFR 99.4 mL/min/1.73     Narrative:      GFR Normal >60  Chronic Kidney Disease <60  Kidney Failure <15      CBC & Differential [[id number]]  (Abnormal) Collected: 07/08/24 0518    Specimen: Blood Updated: 07/08/24 0533    Narrative:      The following orders were created for panel order CBC & Differential.  Procedure                               Abnormality         Status                     ---------                               -----------         ------                     CBC Auto Differential[[id number]]        Abnormal            Final result                 Please view results for these tests on the individual orders.    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     5/6/2020 - 11/17/2020 Chemotherapy    Treatment Summary   Plan Name: OP FOLFOX 6 Q2W  Treatment Goal: Curative  Status: Inactive  Start Date: 5/6/2020  End Date: 11/6/2020  Provider: Dylan Leyva MD  Chemotherapy: fluorouraciL injection 945 mg, 400 mg/m2 = 945 mg, Intravenous, Clinic/[facility] 1 time, 14 of 14 cycles  Administration: 945 mg (5/6/2020), 945 mg (5/20/2020), 945 mg (6/3/2020), 945 mg (6/17/2020), 945 mg (7/29/2020), 945 mg (8/12/2020), 945 mg (8/26/2020), 945 mg (9/9/2020), 945 mg (9/23/2020), 945 mg (10/6/2020), 945 mg (10/21/2020), 945 mg (11/4/2020)  fluorouraciL 2,400 mg/m2 = 5,665 mg in sodium chloride 0.9% 240 mL chemo infusion, 2,400 mg/m2 = 5,665 mg, Intravenous, Over 46 hours, 14 of 14 cycles  Administration: 5,665 mg (5/6/2020), 5,665 mg (5/20/2020), 5,665 mg (6/3/2020), 5,665 mg (6/17/2020), 5,665 mg (7/29/2020), 5,665 mg (8/12/2020), 5,665 mg (8/26/2020), 5,665 mg (9/9/2020), 5,665 mg (9/23/2020), 5,665 mg (10/6/2020), 5,665 mg (10/21/2020), 5,665 mg (11/4/2020)  leucovorin calcium 900 mg in dextrose 5 % 250 mL infusion, 945 mg, Intravenous, Clinic/[facility] 1 time, 14 of 14 cycles  Administration: 900 mg (5/6/2020), 900 mg (5/20/2020), 900 mg (6/3/2020), 900 mg (6/17/2020), 945 mg (6/30/2020), 945 mg (7/20/2020), 945 mg (7/29/2020), 945 mg (8/12/2020), 945 mg (8/26/2020), 945 mg (9/9/2020), 945 mg (9/23/2020), 945 mg (10/6/2020), 945 mg (10/21/2020), 945 mg (11/4/2020)  oxaliplatin (ELOXATIN) 200 mg in dextrose 5 % 500 mL chemo infusion, 201 mg, Intravenous, Clinic/HOD 1 time, 6 of 6 cycles  Dose modification: 65 mg/m2 (original dose 85 mg/m2, Cycle 6)  Administration: 200 mg (5/6/2020), 200 mg (5/20/2020), 200 mg (6/3/2020), 200 mg (6/17/2020), 201 mg (6/30/2020), 150 mg (7/20/2020)     6/16/2021 -  Chemotherapy    Treatment Summary   Plan Name: OP COLORECTAL FOLFIRI + BEVACIZUMAB Q2W  Treatment Goal: Control  Status: Active  Start Date:      RBC 2.38 10*6/mm3      Hemoglobin 8.0 g/dL      Hematocrit 24.7 %      .8 fL      MCH 33.6 pg      MCHC 32.4 g/dL      RDW 17.5 %      RDW-SD 66.5 fl      MPV 10.0 fL      Platelets 139 10*3/mm3      Neutrophil % 70.8 %      Lymphocyte % 15.1 %      Monocyte % 10.3 %      Eosinophil % 3.0 %      Basophil % 0.2 %      Immature Grans % 0.6 %      Neutrophils, Absolute 3.84 10*3/mm3      Lymphocytes, Absolute 0.82 10*3/mm3      Monocytes, Absolute 0.56 10*3/mm3      Eosinophils, Absolute 0.16 10*3/mm3      Basophils, Absolute 0.01 10*3/mm3      Immature Grans, Absolute 0.03 10*3/mm3      nRBC 0.0 /100 WBC     Phosphorus [[id number]]  (Abnormal) Collected: 07/07/24 1646    Specimen: Blood Updated: 07/07/24 1741     Phosphorus 2.4 mg/dL     Potassium [[id number]]  (Normal) Collected: 07/07/24 1646    Specimen: Blood Updated: 07/07/24 1725     Potassium 3.6 mmol/L     T4, Free [[id number]]  (Normal) Collected: 07/07/24 0535    Specimen: Blood Updated: 07/07/24 0932     Free T4 1.27 ng/dL     Hemoglobin A1c [[id number]]  (Abnormal) Collected: 07/07/24 0535    Specimen: Blood Updated: 07/07/24 0648     Hemoglobin A1C 4.60 %     Narrative:      Hemoglobin A1C Ranges:    Increased Risk for Diabetes  5.7% to 6.4%  Diabetes                     >= 6.5%  Diabetic Goal                < 7.0%    Comprehensive Metabolic Panel [[id number]]  (Abnormal) Collected: 07/07/24 0535    Specimen: Blood Updated: 07/07/24 0622     Glucose 90 mg/dL      BUN 9 mg/dL      Creatinine 1.03 mg/dL      Sodium 134 mmol/L      Potassium 3.4 mmol/L      Chloride 95 mmol/L      CO2 28.0 mmol/L      Calcium 8.1 mg/dL      Total Protein 6.1 g/dL      Albumin 3.0 g/dL      ALT (SGPT) 26 U/L      AST (SGOT) 60 U/L      Alkaline Phosphatase 203 U/L      Total Bilirubin 5.6 mg/dL      Globulin 3.1 gm/dL      A/G Ratio 1.0 g/dL      BUN/Creatinine Ratio 8.7     Anion Gap 11.0 mmol/L      eGFR 94.8 mL/min/1.73     Narrative:      GFR Normal >60  Chronic  6/16/2021  End Date: 3/18/2022 (Planned)  Provider: Marah Santo MD  Chemotherapy: fluorouraciL injection 990 mg, 400 mg/m2 = 990 mg, Intravenous, [facility] time, 15 of 15 cycles  Administration: 990 mg (6/16/2021), 990 mg (6/30/2021), 990 mg (7/14/2021), 980 mg (7/28/2021), 990 mg (8/11/2021), 990 mg (8/25/2021), 990 mg (9/8/2021), 990 mg (9/22/2021), 990 mg (10/6/2021), 990 mg (10/20/2021), 990 mg (11/3/2021), 990 mg (12/1/2021), 990 mg (12/15/2021), 990 mg (11/17/2021), 990 mg (12/28/2021)  fluorouraciL 2,400 mg/m2 = 5,950 mg in sodium chloride 0.9% 240 mL chemo infusion, 2,400 mg/m2 = 5,950 mg, Intravenous, Over 46 hours, 17 of 20 cycles  Administration: 5,950 mg (6/16/2021), 5,950 mg (6/30/2021), 5,950 mg (7/14/2021), 5,880 mg (7/28/2021), 5,930 mg (8/11/2021), 5,930 mg (8/25/2021), 5,930 mg (9/8/2021), 5,930 mg (9/22/2021), 5,930 mg (10/6/2021), 5,930 mg (10/20/2021), 5,930 mg (11/3/2021), 5,930 mg (12/1/2021), 5,930 mg (12/15/2021), 5,930 mg (11/17/2021), 5,930 mg (12/28/2021), 5,930 mg (2/2/2022), 5,930 mg (1/19/2022)  bevacizumab (AVASTIN) 600 mg in sodium chloride 0.9% 100 mL chemo infusion, 660 mg, Intravenous, [facility] 1 time, 17 of 20 cycles  Administration: 600 mg (6/30/2021), 600 mg (7/14/2021), 600 mg (7/28/2021), 600 mg (6/16/2021), 600 mg (8/11/2021), 655 mg (8/25/2021), 600 mg (9/8/2021), 600 mg (9/22/2021), 600 mg (10/6/2021), 600 mg (10/20/2021), 600 mg (11/3/2021), 655 mg (12/1/2021), 600 mg (12/15/2021), 600 mg (11/17/2021), 600 mg (12/28/2021), 600 mg (2/2/2022), 600 mg (1/19/2022)  irinotecan (CAMPTOSAR) 440 mg in sodium chloride 0.9% 500 mL chemo infusion, 446 mg, Intravenous, [facility] 1 time, 17 of 20 cycles  Administration: 440 mg (6/16/2021), 440 mg (6/30/2021), 440 mg (7/14/2021), 440 mg (7/28/2021), 440 mg (8/11/2021), 444 mg (8/25/2021), 440 mg (9/8/2021), 440 mg (9/22/2021), 440 mg (10/6/2021), 440 mg (10/20/2021), 440 mg (11/3/2021), 440 mg (12/1/2021), 440 mg  (12/15/2021), 440 mg (2021), 440 mg (2021), 440 mg (2022), 440 mg (2022)  leucovorin calcium 400 mg/m2 = 990 mg in dextrose 5 % 250 mL infusion, 400 mg/m2 = 990 mg, Intravenous, Clinic/\[facility]\"" 1 time, 17 of 20 cycles  Administration: 990 mg (2021), 990 mg (2021), 990 mg (2021), 980 mg (2021), 990 mg (2021), 990 mg (2021), 990 mg (2021), 990 mg (2021), 990 mg (10/6/2021), 990 mg (10/20/2021), 990 mg (11/3/2021), 990 mg (2021), 990 mg (12/15/2021), 990 mg (2021), 990 mg (2021), 990 mg (2022), 990 mg (2022)         History:  Past Medical History:   Diagnosis Date    Anxiety     Depression     FH: ovarian cancer 3/16/2020    Hx of psychiatric care     Effexor, Paxil, Lexapro, Zoloft, Wellbutrin, Trazodone Buspar    Hyperthyroidism     Hypothyroid     Kidney calculi     Malignant neoplasm of sigmoid colon 3/16/2020    Menorrhagia     Multinodular goiter 2012    Palpitation     Psychiatric problem     Venous insufficiency       Past Surgical History:   Procedure Laterality Date     SECTION, CLASSIC      x3    COLONOSCOPY N/A 2020    Procedure: COLONOSCOPY;  Surgeon: Shane Parker MD;  Location: [facility];  Service: Endoscopy;  Laterality: N/A;    CYSTOSCOPY W/ URETERAL STENT PLACEMENT Bilateral 3/25/2020    Procedure: CYSTOSCOPY, WITH URETERAL STENT INSERTION;  Surgeon: Claudio Tyson MD;  Location: [address];  Service: Urology;  Laterality: Bilateral;    INSERTION OF TUNNELED CENTRAL VENOUS CATHETER (CVC) WITH SUBCUTANEOUS PORT N/A 2020    Procedure: [id number];  Surgeon: Stephen Diagnostic Provider;  Location: [facility] OR 2ND FLR;  Service: Radiology;  Laterality: N/A;  Room 189/Cindy    LAPAROSCOPIC SIGMOIDECTOMY N/A 3/25/2020    Procedure: COLECTOMY, SIGMOID, LAPAROSCOPIC, flex sig, ERAS high;  Surgeon: Silvio Man MD;  Location: [facility] OR 2ND FLR;  Service: Colon and  Kidney Disease <60  Kidney Failure <15      TSH [[id number]]  (Abnormal) Collected: 07/07/24 0535    Specimen: Blood Updated: 07/07/24 0622     TSH 9.670 uIU/mL     Lipid Panel [[id number]]  (Abnormal) Collected: 07/07/24 0535    Specimen: Blood Updated: 07/07/24 0622     Total Cholesterol 115 mg/dL      Triglycerides 110 mg/dL      HDL Cholesterol 25 mg/dL      LDL Cholesterol  69 mg/dL      VLDL Cholesterol 21 mg/dL      LDL/HDL Ratio 2.72    Narrative:      Cholesterol Reference Ranges  (U.S. Department of Health and Human Services ATP III Classifications)    Desirable          <200 mg/dL  Borderline High    200-239 mg/dL  High Risk          >240 mg/dL      Triglyceride Reference Ranges  (U.S. Department of Health and Human Services ATP III Classifications)    Normal           <150 mg/dL  Borderline High  150-199 mg/dL  High             200-499 mg/dL  Very High        >500 mg/dL    HDL Reference Ranges  (U.S. Department of Health and Human Services ATP III Classifications)    Low     <40 mg/dl (major risk factor for CHD)  High    >60 mg/dl ('negative' risk factor for CHD)        LDL Reference Ranges  ([place] Department of Health and Human Services ATP III Classifications)    Optimal          <100 mg/dL  Near Optimal     100-129 mg/dL  Borderline High  130-159 mg/dL  High             160-189 mg/dL  Very High        >189 mg/dL    Magnesium [[id number]]  (Normal) Collected: 07/07/24 0535    Specimen: Blood Updated: 07/07/24 0622     Magnesium 1.7 mg/dL     Ammonia [[id number]]  (Abnormal) Collected: 07/07/24 0535    Specimen: Blood Updated: 07/07/24 0613     Ammonia 111 umol/L     CBC & Differential [[id number]]  (Abnormal) Collected: 07/07/24 0535    Specimen: Blood Updated: 07/07/24 0555    Narrative:      The following orders were created for panel order CBC & Differential.  Procedure                               Abnormality         Status                     ---------                               -----------          ------                     CBC Auto Differential[[id number]]        Abnormal            Final result                 Please view results for these tests on the individual orders.    CBC Auto Differential [[id number]]  (Abnormal) Collected: 07/07/24 0535    Specimen: Blood Updated: 07/07/24 0555     WBC 4.45 10*3/mm3      RBC 2.20 10*6/mm3      Hemoglobin 7.4 g/dL      Hematocrit 23.0 %      .5 fL      MCH 33.6 pg      MCHC 32.2 g/dL      RDW 17.2 %      RDW-SD 66.3 fl      MPV 10.2 fL      Platelets 118 10*3/mm3      Neutrophil % 64.6 %      Lymphocyte % 19.3 %      Monocyte % 11.5 %      Eosinophil % 3.8 %      Basophil % 0.4 %      Immature Grans % 0.4 %      Neutrophils, Absolute 2.87 10*3/mm3      Lymphocytes, Absolute 0.86 10*3/mm3      Monocytes, Absolute 0.51 10*3/mm3      Eosinophils, Absolute 0.17 10*3/mm3      Basophils, Absolute 0.02 10*3/mm3      Immature Grans, Absolute 0.02 10*3/mm3      nRBC 0.0 /100 WBC     Phosphorus [[id number]]  (Abnormal) Collected: 07/07/24 0236    Specimen: Blood Updated: 07/07/24 0421     Phosphorus 1.7 mg/dL     Fentanyl, Urine - Indwelling Urethral Catheter [[id number]]  (Normal) Collected: 07/06/24 1846    Specimen: Urine from Indwelling Urethral Catheter Updated: 07/06/24 1905     Fentanyl, Urine Negative    Narrative:      Negative Threshold:      Fentanyl 5 ng/mL     The normal value for the drug tested is negative. This report includes final unconfirmed screening results to be used for medical treatment purposes only. Unconfirmed results must not be used for non-medical purposes such as employment or legal testing. Clinical consideration should be applied to any drug of abuse test, particularly when unconfirmed results are used.           Urine Drug Screen - Indwelling Urethral Catheter [[id number]]  (Abnormal) Collected: 07/06/24 1846    Specimen: Urine from Indwelling Urethral Catheter Updated: 07/06/24 1904     THC, Screen, Urine Positive     Phencyclidine (PCP),  Rectal;  Laterality: N/A;    MOBILIZATION OF SPLENIC FLEXURE  3/25/2020    Procedure: MOBILIZATION, SPLENIC FLEXURE;  Surgeon: Silvio Man MD;  [facility];  Service: Colon and Rectal;;    tonsillectomy      TOTAL ABDOMINAL HYSTERECTOMY W/ BILATERAL SALPINGOOPHORECTOMY N/A 3/25/2020    Procedure: HYSTERECTOMY, TOTAL, ABDOMINAL, WITH BILATERAL SALPINGO-OOPHORECTOMY;  Surgeon: Keron Brady MD;  Location: [facility] OR 2ND [facility];  Service: Oncology;  Laterality: N/A;     Family History   Problem Relation Age of Onset    Heart disease Father     Diabetes Mother 65    Drug abuse Brother     Drug abuse Brother     Cancer Maternal Aunt         lung cancer    Cancer Maternal Grandmother         stomach cancer- started in ovaries    Ovarian cancer Maternal Grandmother         stomach cancer- started in ovaries    Colon cancer Paternal Uncle     Cancer Paternal Uncle     Ovarian cancer Maternal Aunt     Ovarian cancer Maternal Aunt     Ovarian cancer Cousin         mother had ovarian cancer    Drug abuse Son     Drug abuse Son         clean/sober since 2012    Colon cancer Son     No Known Problems Daughter     Uterine cancer Neg Hx     Breast cancer Neg Hx      Social History     Tobacco Use    Smoking status: Never Smoker    Smokeless tobacco: Never Used   Substance and Sexual Activity    Alcohol use: Yes     Comment: occasionally- twice monthly    Drug use: Never    Sexual activity: Yes     Partners: Male     Birth control/protection: See Surgical Hx        ROS:   Review of Systems   Constitutional: Positive for malaise/fatigue. Negative for fever and weight loss.   HENT: Negative for congestion, hearing loss, nosebleeds and sore throat.    Eyes: Negative for double vision and photophobia.   Respiratory: Negative for cough, hemoptysis, sputum production, shortness of breath and wheezing.    Cardiovascular: Negative for chest pain, palpitations, orthopnea and leg swelling.    Gastrointestinal: Positive for constipation and nausea. Negative for abdominal pain, blood in stool, diarrhea, heartburn and vomiting.   Genitourinary: Negative for dysuria, frequency, hematuria and urgency.   Musculoskeletal: Negative for back pain, joint pain and myalgias.   Skin: Negative for itching and rash.   Neurological: Negative for dizziness, tingling, seizures, weakness and headaches.   Endo/Heme/Allergies: Negative for polydipsia. Does not bruise/bleed easily.   Psychiatric/Behavioral: Negative for depression and memory loss. The patient is nervous/anxious. The patient does not have insomnia.         Objective:     Vitals:    02/02/22 1040   BP: 122/78   Pulse: 88   Resp: 18   Temp: 97.7 °F (36.5 °C)   TempSrc: Oral   SpO2: 96%   Weight: 133.2 kg (293 lb 10.4 oz)   PainSc: 0-No pain     Wt Readings from Last 10 Encounters:   02/02/22 132.2 kg (291 lb 7.2 oz)   02/02/22 133.2 kg (293 lb 10.4 oz)   01/18/22 133.7 kg (294 lb 10.7 oz)   01/10/22 133.4 kg (294 lb 1.5 oz)   12/28/21 135.4 kg (298 lb 8.1 oz)   12/23/21 133.4 kg (294 lb 1.5 oz)   12/15/21 135.2 kg (298 lb 1 oz)   12/13/21 135.2 kg (298 lb 1 oz)   12/01/21 135.9 kg (299 lb 9.7 oz)   11/29/21 135.9 kg (299 lb 9.7 oz)       Physical Examination:   Physical Exam  Vitals and nursing note reviewed.   Constitutional:       General: She is not in acute distress.     Appearance: She is not diaphoretic.   HENT:      Head: Normocephalic.      Mouth/Throat:      Pharynx: No oropharyngeal exudate.   Eyes:      General: No scleral icterus.     Conjunctiva/sclera: Conjunctivae normal.   Neck:      Thyroid: No thyromegaly.   Cardiovascular:      Rate and Rhythm: Normal rate and regular rhythm.      Heart sounds: Normal heart sounds. No murmur heard.      Pulmonary:      Effort: Pulmonary effort is normal. No respiratory distress.      Breath sounds: No stridor. No wheezing or rales.   Chest:      Chest wall: No tenderness.   Abdominal:      General: Bowel  Urine Negative     Cocaine Screen, Urine Negative     Methamphetamine, Ur Negative     Opiate Screen Negative     Amphetamine Screen, Urine Negative     Benzodiazepine Screen, Urine Positive     Tricyclic Antidepressants Screen Negative     Methadone Screen, Urine Negative     Barbiturates Screen, Urine Negative     Oxycodone Screen, Urine Negative     Buprenorphine, Screen, Urine Negative    Narrative:      Cutoff For Drugs Screened:    Amphetamines               500 ng/ml  Barbiturates               200 ng/ml  Benzodiazepines            150 ng/ml  Cocaine                    150 ng/ml  Methadone                  200 ng/ml  Opiates                    100 ng/ml  Phencyclidine               25 ng/ml  THC                         50 ng/ml  Methamphetamine            500 ng/ml  Tricyclic Antidepressants  300 ng/ml  Oxycodone                  100 ng/ml  Buprenorphine               10 ng/ml    The normal value for all drugs tested is negative. This report includes unconfirmed screening results, with the cutoff values listed, to be used for medical treatment purposes only.  Unconfirmed results must not be used for non-medical purposes such as employment or legal testing.  Clinical consideration should be applied to any drug of abuse test, particularly when unconfirmed results are used.      Phosphorus [[id number]]  (Abnormal) Collected: 07/06/24 1617    Specimen: Blood Updated: 07/06/24 1653     Phosphorus 1.4 mg/dL     POC Glucose Once [[id number]]  (Normal) Collected: 07/06/24 1219    Specimen: Blood Updated: 07/06/24 1231     Glucose 116 mg/dL      Comment: : billy GriffithyMeter ID: [id number]       Potassium [[id number]]  (Normal) Collected: 07/06/24 1047    Specimen: Blood Updated: 07/06/24 1125     Potassium 3.8 mmol/L     Hemoglobin & Hematocrit, Blood [[id number]]  (Abnormal) Collected: 07/06/24 1047    Specimen: Blood Updated: 07/06/24 1114     Hemoglobin 8.1 g/dL      Hematocrit 25.2 %            Imaging Results (Last 72 Hours)       ** No results found for the last 72 hours. **               ASSESSMENT/PLAN       Examination and evaluation of wound(s) was performed.    DIAGNOSIS:   Friction injury to scrotum  Scrotal edema    Alcohol withdrawal    Primary hypertension    Fatty liver    Hyponatremia    Hypokalemia    Hypomagnesemia    Elevated lactic acid level    Alcoholism    Transaminitis    Alcoholic encephalopathy    Type 2 diabetes mellitus    Alcoholic steatohepatitis    BMI 40.0-44.9, adult          PLAN:   Orders placed for wound care and pressure/moisture management as listed below.       Start     Ordered    07/08/24 2000  Wound Care  Every 12 Hours        Comments: For management of scrotal wound   Question:  Wound Care Instructions  Answer:  Apply Moisture Barrier After Any Incontinence and PRN. Zinc barrier cream    07/08/24 0948    07/08/24 0949  Turn Patient  Now Then Every 2 Hours         07/08/24 0948    07/08/24 0949  Elevate Heels Off of Bed  Until Discontinued         07/08/24 0948    07/08/24 0949  Use Seat Cushion When Up In Chair  Continuous         07/08/24 0948    07/08/24 0949  Silicone Border Dressing to Bony Prominences  Every Shift       07/08/24 0948    07/08/24 0949  Use Repositioning Wedge to Position Patient  Continuous        Comments: Use Comfort Glide repositioning sheet and wedges to position patient.    07/08/24 0948                   Discussed findings and treatment plan including risks, benefits, and treatment options with patient in detail. Patient agreed with treatment plan.      This document has been electronically signed by LÁZARO Tavarez on 7/8/2024 10:07 WENDY      sounds are normal. There is no distension.      Palpations: Abdomen is soft. There is no mass.      Tenderness: There is no abdominal tenderness. There is no rebound.   Musculoskeletal:         General: No tenderness or deformity. Normal range of motion.      Cervical back: Neck supple.   Lymphadenopathy:      Cervical: No cervical adenopathy.   Skin:     General: Skin is warm and dry.      Findings: No erythema or rash.   Neurological:      Mental Status: She is alert and oriented to person, place, and time.      Cranial Nerves: No cranial nerve deficit.      Coordination: Coordination normal.      Gait: Gait is intact.   Psychiatric:         Mood and Affect: Affect normal.         Cognition and Memory: Memory normal.         Judgment: Judgment normal.          Diagnostic Tests:  Significant Imaging: I have reviewed and interpreted all pertinent imaging results/findings.    PET:  Impression:     No FDG PET/CT findings to suggest recurrent or metastatic disease.  The patient's left thyroid uptake is stable from prior exam.        Electronically signed by: Rupali Mayen MD  Date:                                            01/06/2022  Time:                                           11:10    Laboratory Data:  All pertinent labs have been reviewed.  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 Oxaliplatin was stopped due to severe adverse reaction.     Follow-up CTs and PET in May 2021 showed enlarging retroperitoneal node, concerning for metastatic disease.      She is tolerating FOLFIRI + Avastin well and has completed 16 cycles so far. Most recent PET scan showed complete resolution of previously noted hypermetabolic nodes and no other sites of disease.     Plan to discuss her case at our tumor board to see if she is a surgical candidate. If not, we will consider maintenance 5 FU and Avastin.     Given B Adán mutation, she would be a candidate for Cetuximab and BRAF inhibitor at the time of disease progression.    Chemotherapy-induced neutropenia on  1/10/22   - Delay chemo by 1 week. Drop 5 FU bolus with subsequent treatments.   - Improved/resolved, cont with 5FU bolus off     Renal stones  Continue Urology follow up.     Hypercalcemia  she has mild primary hyperparathyroidism.  Continue to monitor calcium, continue adequate hydration.,instructed her to follow up with endocrinology     Transaminitis Fatty liver   Mild and stable. Continue to monitor.     Other constipation   Continue stool softeners and intermittent lactulose.     Nausea  Uncontrolled with promethazine and zofran. Start Sancuso patch.     Dysthymia  Continue Wellbutrin and Buspar and  follow up with Dr. Palm.      ECOG SCORE    0 - Fully active-able to carry on all pre-disease performance without restriction         Discussion:     RTC in 2 weeks for chemo with MD visit and Lab prior     Plan was discussed with the patient at length, and she verbalized understanding. Gail was given an opportunity to ask questions that were answered to her satisfaction, and she was advised to call in the interval if any problems or questions arise.    Electronically signed by Lin Barcenas MD      Answers for HPI/ROS submitted by the patient on 1/31/2022  appetite change : No  unexpected weight change:  No  mouth sores: Yes  visual disturbance: No  adenopathy: No

## 2025-03-03 NOTE — TELEPHONE ENCOUNTER
Care Due:                  Date            Visit Type   Department     Provider  --------------------------------------------------------------------------------                                ESTABLISHED                              PATIENT      [facility]  Last Visit: 07-      [facility]       Tima MALDONADO  Next Visit: None Scheduled  None         None Found                                                            Last  Test          Frequency    Reason                     Performed    Due Date  --------------------------------------------------------------------------------    Office Visit  12 months..  buPROPion................  07- 07-    Powered by [employer]. Reference number: [id number]. 9/06/2020 11:16:47 AM   CDT   Paperwork placed on PCP desk.
